# Patient Record
Sex: FEMALE | Race: WHITE | NOT HISPANIC OR LATINO | Employment: OTHER | URBAN - METROPOLITAN AREA
[De-identification: names, ages, dates, MRNs, and addresses within clinical notes are randomized per-mention and may not be internally consistent; named-entity substitution may affect disease eponyms.]

---

## 2017-01-03 ENCOUNTER — ANTICOAGULATION VISIT (OUTPATIENT)
Dept: CARDIOLOGY | Facility: CLINIC | Age: 62
End: 2017-01-03

## 2017-01-03 DIAGNOSIS — I48.91 ATRIAL FIBRILLATION, UNSPECIFIED TYPE (HCC): ICD-10-CM

## 2017-01-03 DIAGNOSIS — Z95.2 MITRAL VALVE REPLACED: ICD-10-CM

## 2017-01-03 LAB
INR PPP: 2.3
INR PPP: 2.3

## 2017-01-03 RX ORDER — OMEPRAZOLE 40 MG/1
CAPSULE, DELAYED RELEASE ORAL
Qty: 30 CAPSULE | Refills: 3 | Status: SHIPPED | OUTPATIENT
Start: 2017-01-03 | End: 2017-05-09 | Stop reason: SDUPTHER

## 2017-01-03 RX ORDER — TEMAZEPAM 30 MG/1
CAPSULE ORAL
Qty: 30 CAPSULE | Refills: 2 | Status: SHIPPED | OUTPATIENT
Start: 2017-01-03 | End: 2017-08-09 | Stop reason: SDUPTHER

## 2017-01-04 ENCOUNTER — TELEPHONE (OUTPATIENT)
Dept: FAMILY MEDICINE CLINIC | Facility: CLINIC | Age: 62
End: 2017-01-04

## 2017-01-05 RX ORDER — POTASSIUM CHLORIDE 750 MG/1
10 TABLET, EXTENDED RELEASE ORAL 3 TIMES DAILY
Qty: 90 TABLET | Refills: 3 | Status: SHIPPED | OUTPATIENT
Start: 2017-01-05 | End: 2017-05-18 | Stop reason: SDUPTHER

## 2017-01-11 ENCOUNTER — TELEPHONE (OUTPATIENT)
Dept: FAMILY MEDICINE CLINIC | Facility: CLINIC | Age: 62
End: 2017-01-11

## 2017-01-11 DIAGNOSIS — Z02.89 PAIN MEDICATION AGREEMENT: Primary | ICD-10-CM

## 2017-01-11 RX ORDER — HYDROCODONE BITARTRATE AND ACETAMINOPHEN 5; 325 MG/1; MG/1
1 TABLET ORAL EVERY 8 HOURS PRN
Qty: 60 TABLET | Refills: 0 | Status: SHIPPED | OUTPATIENT
Start: 2017-01-11 | End: 2017-02-08 | Stop reason: SDUPTHER

## 2017-01-11 NOTE — TELEPHONE ENCOUNTER
Pt informed of lab results she wants to know if we can go ahead and put in a urine drug screen so she can get that over with

## 2017-01-11 NOTE — TELEPHONE ENCOUNTER
----- Message from Mary Ellen Sarmiento MA sent at 1/11/2017  3:13 PM EST -----  Contact: PATIENT [phone]      ----- Message -----     From: Nadira Alegria     Sent: 1/11/2017   2:09 PM       To: Mary Ellen Sarmiento MA    LAB RESULTS DONE HERE

## 2017-01-19 LAB
CONV REPORT SUMMARY: NORMAL
Lab: NORMAL

## 2017-01-31 RX ORDER — LISINOPRIL 5 MG/1
5 TABLET ORAL DAILY
Qty: 30 TABLET | Refills: 3 | Status: SHIPPED | OUTPATIENT
Start: 2017-01-31 | End: 2017-06-07 | Stop reason: SDUPTHER

## 2017-02-08 ENCOUNTER — TELEPHONE (OUTPATIENT)
Dept: FAMILY MEDICINE CLINIC | Facility: CLINIC | Age: 62
End: 2017-02-08

## 2017-02-08 RX ORDER — HYDROCODONE BITARTRATE AND ACETAMINOPHEN 5; 325 MG/1; MG/1
1 TABLET ORAL EVERY 8 HOURS PRN
Qty: 60 TABLET | Refills: 0 | Status: SHIPPED | OUTPATIENT
Start: 2017-02-08 | End: 2017-03-21 | Stop reason: SDUPTHER

## 2017-03-20 RX ORDER — ROPINIROLE 2 MG/1
2 TABLET, FILM COATED ORAL NIGHTLY
Qty: 30 TABLET | Refills: 3 | Status: SHIPPED | OUTPATIENT
Start: 2017-03-20 | End: 2017-07-03 | Stop reason: SDUPTHER

## 2017-03-21 ENCOUNTER — TELEPHONE (OUTPATIENT)
Dept: FAMILY MEDICINE CLINIC | Facility: CLINIC | Age: 62
End: 2017-03-21

## 2017-03-21 RX ORDER — HYDROCODONE BITARTRATE AND ACETAMINOPHEN 5; 325 MG/1; MG/1
1 TABLET ORAL EVERY 8 HOURS PRN
Qty: 60 TABLET | Refills: 0 | Status: SHIPPED | OUTPATIENT
Start: 2017-03-21 | End: 2017-03-23 | Stop reason: SDUPTHER

## 2017-03-21 RX ORDER — WARFARIN SODIUM 3 MG/1
TABLET ORAL
Qty: 45 TABLET | Refills: 1 | Status: SHIPPED | OUTPATIENT
Start: 2017-03-21 | End: 2017-06-21 | Stop reason: SDUPTHER

## 2017-03-23 RX ORDER — HYDROCODONE BITARTRATE AND ACETAMINOPHEN 5; 325 MG/1; MG/1
1 TABLET ORAL EVERY 8 HOURS PRN
Qty: 90 TABLET | Refills: 0 | Status: SHIPPED | OUTPATIENT
Start: 2017-03-23 | End: 2017-05-25 | Stop reason: SDUPTHER

## 2017-04-04 RX ORDER — ATORVASTATIN CALCIUM 40 MG/1
40 TABLET, FILM COATED ORAL DAILY
Qty: 30 TABLET | Refills: 3 | Status: ON HOLD | OUTPATIENT
Start: 2017-04-04 | End: 2017-08-17 | Stop reason: SDUPTHER

## 2017-04-07 ENCOUNTER — OFFICE VISIT (OUTPATIENT)
Dept: CARDIOLOGY | Facility: CLINIC | Age: 62
End: 2017-04-07

## 2017-04-07 VITALS
WEIGHT: 167 LBS | HEART RATE: 68 BPM | OXYGEN SATURATION: 98 % | BODY MASS INDEX: 36.03 KG/M2 | HEIGHT: 57 IN | SYSTOLIC BLOOD PRESSURE: 158 MMHG | DIASTOLIC BLOOD PRESSURE: 82 MMHG

## 2017-04-07 DIAGNOSIS — Z98.890 S/P TVR (TRICUSPID VALVE REPAIR): ICD-10-CM

## 2017-04-07 DIAGNOSIS — E78.49 OTHER HYPERLIPIDEMIA: ICD-10-CM

## 2017-04-07 DIAGNOSIS — I48.0 PAROXYSMAL ATRIAL FIBRILLATION (HCC): ICD-10-CM

## 2017-04-07 DIAGNOSIS — I10 ESSENTIAL HYPERTENSION: ICD-10-CM

## 2017-04-07 DIAGNOSIS — Z95.2 MITRAL VALVE REPLACED: Primary | ICD-10-CM

## 2017-04-07 PROCEDURE — 99214 OFFICE O/P EST MOD 30 MIN: CPT | Performed by: INTERNAL MEDICINE

## 2017-04-07 PROCEDURE — 93000 ELECTROCARDIOGRAM COMPLETE: CPT | Performed by: INTERNAL MEDICINE

## 2017-04-07 NOTE — PROGRESS NOTES
Date of Office Visit: 17  Encounter Provider: Earnest Gan MD  Place of Service: [facility] CARDIOLOGY  Patient Name: Paz Browne  :1955      No chief complaint on file.    History of Present Illness  HPI Comments:  The patient is a pleasant, 61-year-old female with a history of previous valvular disease  but then had been lost to follow-up for quite some time.  She had been in the hospital  with progressive shortness of breath in 2014 and was found to be in rapid atrial  fibrillation as well as congestive heart failure.  She had an echocardiogram that showed  normal left ventricular systolic function.  The left atrium was severely dilated.  The  right ventricle was moderately enlarged.  She had severe mitral and severe tricuspid  insufficiency with severe pulmonary hypertension.  She was diuresed and rate controlled.   She then underwent transesophageal echocardiogram that confirmed that.  She then had a  cardiac catheterization, which showed no significant coronary disease but severer mitral  insufficiency.  She underwent urgent mitral valve repair using a #26 Physio-II ring  annuloplasty, which was unsuccessful and had to be replaced with a #31 mm porcine [employer]  Epic prosthesis.  She had tricuspid valve repair.  She also had right and left CryoMaze  procedure and closure of the atrial appendage.  She did well postoperatively and had some  transient renal insufficiency.  She had some junctional rhythm so she was not really  started on antiarrhythmics and was bradycardic, but she went back into atrial  fibrillation.  She was never really in sinus rhythm, so she was started on warfarin.   Since then, she then presented to our arrhythmia clinic on 10/02/2014, with palpitations,  tachycardia, and more shortness of breath.  She appeared to be in atrial flutter.  We  upped her carvedilol, but she continued to have problems so we started her on amiodarone.  She  was then adequately anticoagulated and then underwent electrocardioversion in 10/2014.  We then saw her June 2016 with progressive dyspnea.  She was admitted to the hospital.  She had an echocardiogram that showed normal left her systolic function her mitral valve replacement and tricuspid valve repair appeared normal she had marked pulmonary hypertension.  She had marked diffuse T-wave inversion in her precordial leads.  Her BNP was just mildly elevated.  She underwent cardiac catheterization which showed normal coronary arteries, right and left sided filling pressures were normal but she had moderate pulmonary hypertension.  Her wedge pressure was normal she did not respond vaso-dilators.  She was seen by Dr. Melo of pulmonary and was felt that her issues were predominantly due to her underlying lung disease.  She now comes in for follow-up.  She is doing well.  She is short of breath but is on continuous oxygen and if anything feels better than she's felt in a while. She's had no chest pain or pressure.  No palpitations no near-syncope or syncope.  She gets rare episodes of palpitations maybe once a month lasting one to 2 seconds.  She does get some dizziness if she gets up too quickly.  She did go to [facility] they told her that they felt she had 12-18 months without transplant but at most 5 years with transplant with the risks high.  She's opted not to pursue that.  Again overall she feels that things are better at home.  She had an episode of nosebleed but her INR was actually 2.1 at that time she's had no more sense.    Congestive Heart Failure   Pertinent negatives include no abdominal pain, muscle weakness or nocturia.   Atrial Fibrillation   Symptoms are negative for dizziness and weakness. Past medical history includes atrial fibrillation and CHF.   Shortness of Breath   Associated symptoms include headaches. Pertinent negatives include no abdominal pain, fever,  rash or vomiting.         Past Medical History:   Diagnosis Date   • Atrial fibrillation     Fani procedure   • Atrial flutter     cardioversion   • CHF (congestive heart failure)    • Colon polyp    • COPD (chronic obstructive pulmonary disease)    • Hyperlipidemia    • Hypertension    • Mitral regurgitation    • Palpitations    • Pulmonary hypertension    • Renal failure    • Tachycardia    • Valvular heart disease          Past Surgical History:   Procedure Laterality Date   • CARDIAC CATHETERIZATION  09/01/2014    Right dominant systemt, normal coronary arteries.    • CARDIAC CATHETERIZATION Left 6/10/2016    Procedure: Cardiac catheterization;  Surgeon: Sergei Hall MD;  Location:  [facility] CATH INVASIVE LOCATION;  Service:    • CARDIAC CATHETERIZATION N/A 6/10/2016    Procedure: Right Heart Cath;  Surgeon: Sergei Hall MD;  Location:  [facility] CATH INVASIVE LOCATION;  Service:    • COLONOSCOPY     • KIDNEY SURGERY  04/22/2013   • MAZE PROCEDURE     • MITRAL VALVE REPLACEMENT     • TRICUSPID VALVE REPLACEMENT           Current Outpatient Prescriptions on File Prior to Visit   Medication Sig Dispense Refill   • atorvastatin (LIPITOR) 40 MG tablet Take 1 tablet by mouth Daily. 30 tablet 3   • carvedilol (COREG) 6.25 MG tablet Take 1 tablet by mouth 2 (two) times a day with meals. 60 tablet 5   • cyclobenzaprine (FLEXERIL) 10 MG tablet TAKE 1 TABLET BY MOUTH EVERY BEDTIME 90 tablet 3   • fluticasone-salmeterol (ADVAIR DISKUS) 250-50 MCG/DOSE DISKUS Inhale 1 puff 2 (Two) Times a Day. 60 each 3   • furosemide (LASIX) 20 MG tablet TAKE ONE TABLET BY MOUTH DAILY 90 tablet 1   • HYDROcodone-acetaminophen (NORCO) 5-325 MG per tablet Take 1 tablet by mouth Every 8 (Eight) Hours As Needed for Moderate Pain (4-6). 90 tablet 0   • lisinopril (PRINIVIL,ZESTRIL) 5 MG tablet Take 1 tablet by mouth Daily. 30 tablet 3   • loratadine (CLARITIN) 10 MG tablet Take 10 mg by mouth 2 (Two) Times a Day.     • meclizine (ANTIVERT) 25 MG tablet  TAKE 1 TABLET EVERY 8 HOURS AS NEEDED FOR DIZZINESS  3   • MULTIPLE VITAMINS PO Take by mouth daily.     • omeprazole (priLOSEC) 40 MG capsule TAKE 1 CAPSULE BY MOUTH EVERY DAY 30 capsule 3   • potassium chloride (K-DUR,KLOR-CON) 10 MEQ CR tablet Take 1 tablet by mouth 3 (Three) Times a Day. 90 tablet 3   • rOPINIRole (REQUIP) 2 MG tablet Take 1 tablet by mouth Every Night. 30 tablet 3   • sertraline (ZOLOFT) 100 MG tablet Take 1 tablet by mouth daily. 90 tablet 3   • temazepam (RESTORIL) 30 MG capsule TAKE ONE CAPSULE BY MOUTH AT BEDTIME 30 capsule 2   • Umeclidinium-Vilanterol 62.5-25 MCG/INH aerosol powder  Inhale 62.5 mg Daily. 1 each 3   • warfarin (COUMADIN) 3 MG tablet TAKE 1 TABLET BY MOUTH DAILY. 45 tablet 1   • [DISCONTINUED] azithromycin (ZITHROMAX) 250 MG tablet Take 2 tablets the first day, then 1 tablet daily for 4 days. 6 tablet 0     No current facility-administered medications on file prior to visit.          Social History     Social History   • Marital status:      Spouse name: N/A   • Number of children: N/A   • Years of education: N/A     Occupational History   • Not on file.     Social History Main Topics   • Smoking status: Former Smoker   • Smokeless tobacco: Not on file   • Alcohol use No      Comment: caffine use   • Drug use: No   • Sexual activity: Not on file     Other Topics Concern   • Not on file     Social History Narrative       Family History   Problem Relation Age of Onset   • Adopted: Yes   • No Known Problems Mother    • No Known Problems Father          Review of Systems   Constitution: Negative for decreased appetite, diaphoresis, fever, weakness, malaise/fatigue, weight gain and weight loss.   HENT: Positive for headaches and nosebleeds. Negative for congestion, hearing loss and tinnitus.    Eyes: Negative for blurred vision, double vision, vision loss in left eye, vision loss in right eye and visual disturbance.   Cardiovascular:        As noted in HPI   Respiratory:  "       As noted HPI   Endocrine: Negative for cold intolerance and heat intolerance.   Hematologic/Lymphatic: Negative for bleeding problem. Does not bruise/bleed easily.   Skin: Negative for color change, flushing, itching and rash.   Musculoskeletal: Positive for joint pain. Negative for arthritis, back pain, joint swelling, muscle weakness and myalgias.   Gastrointestinal: Negative for bloating, abdominal pain, constipation, diarrhea, dysphagia, heartburn, hematemesis, hematochezia, melena, nausea and vomiting.   Genitourinary: Negative for bladder incontinence, dysuria, frequency, nocturia and urgency.   Neurological: Negative for dizziness, focal weakness, light-headedness, loss of balance, numbness, paresthesias and vertigo.   Psychiatric/Behavioral: Negative for depression, memory loss and substance abuse.       Procedures      ECG 12 Lead  Date/Time: 4/7/2017 10:13 AM  Performed by: RAFAEL COVINGTON  Authorized by: RAFAEL COVINGTON   Comparison: compared with previous ECG   Similar to previous ECG  Rhythm: sinus rhythm  Rate: normal  T depression: V1, V2 and V3  QRS axis: normal                 Objective:    /82  Pulse 68  Ht 57\" (144.8 cm)  Wt 167 lb (75.8 kg)  SpO2 98%  BMI 36.14 kg/m2       Physical Exam  Physical Exam   Constitutional: She is oriented to person, place, and time. She appears well-developed and well-nourished. No distress.   HENT:   Head: Normocephalic.   Eyes: Conjunctivae are normal. Pupils are equal, round, and reactive to light. No scleral icterus.   Neck: Normal carotid pulses, no hepatojugular reflux and no JVD present. Carotid bruit is not present. No tracheal deviation, no edema and no erythema present. No thyromegaly present.   Cardiovascular: Normal rate, regular rhythm, S1 normal, S2 normal, normal heart sounds and intact distal pulses.   No extrasystoles are present. PMI is not displaced.  Exam reveals no gallop, no distant heart sounds and no friction rub.    No " murmur heard.  Pulses:       Carotid pulses are 2+ on the right side, and 2+ on the left side.       Radial pulses are 2+ on the right side, and 2+ on the left side.        Femoral pulses are 2+ on the right side, and 2+ on the left side.       Dorsalis pedis pulses are 2+ on the right side, and 2+ on the left side.        Posterior tibial pulses are 2+ on the right side, and 2+ on the left side.   Pulmonary/Chest: Effort normal. No respiratory distress. She has decreased breath sounds (throughout). She has no wheezes. She has no rhonchi. She has no rales. She exhibits no tenderness.   Abdominal: Soft. Bowel sounds are normal. She exhibits no distension and no mass. There is no hepatosplenomegaly. There is no tenderness. There is no rebound and no guarding.   Musculoskeletal: She exhibits no edema, tenderness or deformity.   Neurological: She is alert and oriented to person, place, and time.   Skin: Skin is warm and dry. No rash noted. She is not diaphoretic. No cyanosis or erythema. No pallor. Nails show no clubbing.   Psychiatric: She has a normal mood and affect. Her speech is normal and behavior is normal. Judgment and thought content normal.           Assessment:    1. This patient is a 61-year-old female with a history of severe mitral and tricuspid insufficiencies, status post mitral valve replacement with a tissue valve and tricuspid  valve repair. She has had previous normal LV systolic function. Repeat echocardiogram June 2016 unremarkable continue the same. To see us in six months.  2. Atrial fibrillation flutter. She is status post atrial appendage closure and CryoMaze procedure. Appears still be in sinus rhythm.   Atrial Fibrillation and Atrial Flutter  Assessment  • The patient has paroxysmal atrial fibrillation  • This is valvular in etiology  • The patient's CHADS2-VASc score is 2  • A [id number] score of 2 or more is considered a high risk for a thromboembolic event  • Warfarin  prescribed    Plan  • Attempt to maintain sinus rhythm  • Continue beta blocker for rhythm control    Subjective - Objective  • The patient underwent cardioversion   • The patient had atrial fibrillation ablation        We'll continue the same area she will see us in follow-up in 6 months.     3. Hypertension. Her blood pressure is adequately controlled.   4. Hyperlipidemia on therapy. Continue the same.   5. Renal insufficiency.  6. Dyspnea this appears to be from her her lung disease. No obvious heart failure.  Anything she seems to be better.  7. Underlying lung disease with moderate to marked pulmonary hypertension. She is following with pulmonary.  She did meet with transplant they felt it would be a candidate but the patient has declined to pursue that.         Plan:

## 2017-04-11 ENCOUNTER — TELEPHONE (OUTPATIENT)
Dept: FAMILY MEDICINE CLINIC | Facility: CLINIC | Age: 62
End: 2017-04-11

## 2017-04-11 NOTE — TELEPHONE ENCOUNTER
----- Message from Trish Jennings sent at 4/11/2017 10:27 AM EDT -----  Contact: [phone]  PT'S  IS CALLING BACK WANTING PT SEEN TODAY BECAUSE HE'S AFRAID SHE HAS PNEUMONIA. PLEASE CALL ASAP    Pt informed go to er/icc

## 2017-04-13 ENCOUNTER — TELEPHONE (OUTPATIENT)
Dept: FAMILY MEDICINE CLINIC | Facility: CLINIC | Age: 62
End: 2017-04-13

## 2017-04-13 NOTE — TELEPHONE ENCOUNTER
----- Message from Trish Jennings sent at 4/13/2017 10:19 AM EDT -----  Contact: [phone]  PT'S  WANTS TO KNOW IF HER HANDICAPPED PERMIT IS DONE. PLEASE CALL    Informed for 2nd time today was told yesterday

## 2017-04-18 ENCOUNTER — TELEPHONE (OUTPATIENT)
Dept: FAMILY MEDICINE CLINIC | Facility: CLINIC | Age: 62
End: 2017-04-18

## 2017-04-18 NOTE — TELEPHONE ENCOUNTER
----- Message from Sarah Lazo sent at 4/18/2017 10:29 AM EDT -----  Contact: [phone]  NEEDS WRITTEN RX FOR HYDROCODONE 5-325MG QTY 90     LAST PRES WAS ONLY QTY 60       New script up front since 3/25

## 2017-04-30 ENCOUNTER — PREP FOR SURGERY (OUTPATIENT)
Dept: GASTROENTEROLOGY | Facility: CLINIC | Age: 62
End: 2017-04-30

## 2017-04-30 DIAGNOSIS — K63.5 COLON POLYPS: Primary | ICD-10-CM

## 2017-04-30 RX ORDER — SODIUM CHLORIDE 0.9 % (FLUSH) 0.9 %
1-10 SYRINGE (ML) INJECTION AS NEEDED
Status: CANCELLED | OUTPATIENT
Start: 2017-08-04

## 2017-05-02 RX ORDER — SERTRALINE HYDROCHLORIDE 100 MG/1
100 TABLET, FILM COATED ORAL DAILY
Qty: 90 TABLET | Refills: 1 | Status: SHIPPED | OUTPATIENT
Start: 2017-05-02 | End: 2017-11-01 | Stop reason: SDUPTHER

## 2017-05-02 RX ORDER — CARVEDILOL 6.25 MG/1
TABLET ORAL
Qty: 180 TABLET | Refills: 1 | Status: SHIPPED | OUTPATIENT
Start: 2017-05-02 | End: 2017-08-09

## 2017-05-03 ENCOUNTER — TELEPHONE (OUTPATIENT)
Dept: FAMILY MEDICINE CLINIC | Facility: CLINIC | Age: 62
End: 2017-05-03

## 2017-05-09 RX ORDER — OMEPRAZOLE 40 MG/1
40 CAPSULE, DELAYED RELEASE ORAL DAILY
Qty: 90 CAPSULE | Refills: 1 | Status: SHIPPED | OUTPATIENT
Start: 2017-05-09 | End: 2017-08-08 | Stop reason: SDUPTHER

## 2017-05-18 RX ORDER — POTASSIUM CHLORIDE 750 MG/1
10 TABLET, EXTENDED RELEASE ORAL 3 TIMES DAILY
Qty: 90 TABLET | Refills: 3 | Status: SHIPPED | OUTPATIENT
Start: 2017-05-18 | End: 2017-08-09

## 2017-05-24 ENCOUNTER — TELEPHONE (OUTPATIENT)
Dept: CARDIOLOGY | Facility: CLINIC | Age: 62
End: 2017-05-24

## 2017-05-25 ENCOUNTER — TELEPHONE (OUTPATIENT)
Dept: FAMILY MEDICINE CLINIC | Facility: CLINIC | Age: 62
End: 2017-05-25

## 2017-05-25 RX ORDER — HYDROCODONE BITARTRATE AND ACETAMINOPHEN 5; 325 MG/1; MG/1
1 TABLET ORAL EVERY 8 HOURS PRN
Qty: 90 TABLET | Refills: 0 | Status: SHIPPED | OUTPATIENT
Start: 2017-05-25 | End: 2017-06-30 | Stop reason: SDUPTHER

## 2017-06-07 ENCOUNTER — TELEPHONE (OUTPATIENT)
Dept: FAMILY MEDICINE CLINIC | Facility: CLINIC | Age: 62
End: 2017-06-07

## 2017-06-07 RX ORDER — LISINOPRIL 5 MG/1
5 TABLET ORAL DAILY
Qty: 30 TABLET | Refills: 3 | Status: ON HOLD | OUTPATIENT
Start: 2017-06-07 | End: 2017-08-25

## 2017-06-14 ENCOUNTER — HOSPITAL ENCOUNTER (EMERGENCY)
Facility: HOSPITAL | Age: 62
Discharge: HOME | End: 2017-06-14
Attending: EMERGENCY MEDICINE | Admitting: EMERGENCY MEDICINE

## 2017-06-14 ENCOUNTER — APPOINTMENT (OUTPATIENT)
Dept: CT IMAGING | Facility: HOSPITAL | Age: 62
End: 2017-06-14

## 2017-06-14 VITALS
OXYGEN SATURATION: 97 % | TEMPERATURE: 98.5 F | WEIGHT: 167 LBS | SYSTOLIC BLOOD PRESSURE: 109 MMHG | DIASTOLIC BLOOD PRESSURE: 75 MMHG | HEART RATE: 70 BPM | RESPIRATION RATE: 16 BRPM | HEIGHT: 67 IN | BODY MASS INDEX: 26.21 KG/M2
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
## 2017-06-14 DIAGNOSIS — S00.03XA SCALP CONTUSION: ICD-10-CM

## 2017-06-14 LAB
INR PPP: 2.16 (ref 0.9–1.1)
PROTHROMBIN TIME: 23.3 SECONDS (ref 11.7–14.2)

## 2017-06-14 PROCEDURE — 70450 CT HEAD/BRAIN W/O DYE: CPT

## 2017-06-14 PROCEDURE — 85610 PROTHROMBIN TIME: CPT | Performed by: PHYSICIAN ASSISTANT

## 2017-06-14 PROCEDURE — 99283 EMERGENCY DEPT VISIT LOW MDM: CPT

## 2017-06-14 NOTE — ED TRIAGE NOTES
Chief Complaint   Patient presents with   • Head Injury     fell 2 weeks ago and has knot on the back of her head that is worse

## 2017-06-14 NOTE — ED PROVIDER NOTES
" EMERGENCY DEPARTMENT ENCOUNTER    CHIEF COMPLAINT  Chief Complaint: Head Injury  History given by: Patient  History limited by: Nothing  Room Number: 01/01  PMD: Javan Martinez MD      HPI:  Pt is a 62 y.o. female with h/o valve replacement on coumadin, who presents to the ED s/p mechanical fall two weeks ago complaining of a gradually worsening hematoma to the left parietal aspect of her scalp. The patient reports that she simply tripped over her dog while she was cleaning out the fridge and she denies any dizziness or lightheadedness prior to the fall. The patient reports that she \"saw stars\" but never completely lost consciousness after the initial fall. She has also experienced a diffuse HA with gradual onset since the head injury occurred but denies any decreased concentration, nausea or vomiting. No other complaints at this time.       Duration:  Seconds  Onset: Sudden  Timing: Constant  Location: Left parietal   Radiation: None  Quality: Dull  Intensity/Severity: Moderate  Progression: Worsening  Associated Symptoms: Hematoma, HA  Aggravating Factors: Palpation  Alleviating Factors: Rest  Previous Episodes: None  Treatment before arrival: None    PAST MEDICAL HISTORY  Active Ambulatory Problems     Diagnosis Date Noted   • Mitral valve replaced 01/25/2016   • Atrial fibrillation [I48.91] 01/25/2016   • Valvular heart disease    • Tachycardia    • Renal failure    • Palpitations    • Hypertension    • Hyperlipidemia    • COPD (chronic obstructive pulmonary disease)    • Mitral regurgitation    • Pain medication agreement 05/04/2016   • Hiatal hernia 05/04/2016   • Primary osteoarthritis involving multiple joints 05/04/2016   • Heart failure 06/08/2016   • Pulmonary hypertension    • S/P TVR (tricuspid valve repair) 07/07/2016   • Long term current use of anticoagulant 10/13/2016   • Pulmonary nodule 10/13/2016   • Aspiration pneumonia 10/14/2016   • Hemoptysis 10/14/2016   • RLS (restless legs syndrome) " 11/18/2016     Resolved Ambulatory Problems     Diagnosis Date Noted   • No Resolved Ambulatory Problems     Past Medical History:   Diagnosis Date   • Atrial fibrillation    • Atrial flutter    • CHF (congestive heart failure)    • Colon polyp    • COPD (chronic obstructive pulmonary disease)    • Hyperlipidemia    • Hypertension    • Mitral regurgitation    • Palpitations    • Pulmonary hypertension    • Renal failure    • Tachycardia    • Valvular heart disease        PAST SURGICAL HISTORY  Past Surgical History:   Procedure Laterality Date   • CARDIAC CATHETERIZATION  09/01/2014    Right dominant systemt, normal coronary arteries.    • CARDIAC CATHETERIZATION Left 6/10/2016    Procedure: Cardiac catheterization;  Surgeon: Sergei Hall MD;  Location:  [facility] LOCATION;  Service:    • CARDIAC CATHETERIZATION N/A 6/10/2016    Procedure: Right Heart Cath;  Surgeon: Sergei Hall MD;  Location:  [facility] LOCATION;  Service:    • COLONOSCOPY     • KIDNEY SURGERY  04/22/2013   • MAZE PROCEDURE     • MITRAL VALVE REPLACEMENT     • TRICUSPID VALVE REPLACEMENT         FAMILY HISTORY  Family History   Problem Relation Age of Onset   • Adopted: Yes   • No Known Problems Mother    • No Known Problems Father        SOCIAL HISTORY  Social History     Social History   • Marital status:      Spouse name: N/A   • Number of children: N/A   • Years of education: N/A     Occupational History   • Not on file.     Social History Main Topics   • Smoking status: Former Smoker   • Smokeless tobacco: Not on file   • Alcohol use No      Comment: caffine use   • Drug use: No   • Sexual activity: Defer     Other Topics Concern   • Not on file     Social History Narrative   • No narrative on file       ALLERGIES  Bupropion; Cephalexin; and Metoprolol    REVIEW OF SYSTEMS  Review of Systems   Constitutional: Negative for chills.   HENT: Negative for congestion, rhinorrhea and sore throat.    Eyes: Negative for pain.    Respiratory: Negative for cough and shortness of breath.    Cardiovascular: Negative for chest pain, palpitations and leg swelling.   Gastrointestinal: Negative for abdominal pain, diarrhea, nausea and vomiting.   Genitourinary: Negative for difficulty urinating, dysuria, flank pain and frequency.   Musculoskeletal: Negative for myalgias, neck pain and neck stiffness.   Skin: Negative for rash.        Hematoma   Neurological: Positive for headaches. Negative for dizziness, speech difficulty, weakness, light-headedness and numbness.   Psychiatric/Behavioral: Negative.    All other systems reviewed and are negative.      PHYSICAL EXAM  ED Triage Vitals   Temp Heart Rate Resp BP SpO2   06/14/17 0947 06/14/17 0947 06/14/17 0947 -- 06/14/17 0947   98.3 °F (36.8 °C) 94 20  96 %      Temp src Heart Rate Source Patient Position BP Location FiO2 (%)   06/14/17 0947 06/14/17 0947 -- -- --   Tympanic Monitor          Physical Exam   Constitutional: She is oriented to person, place, and time and well-developed, well-nourished, and in no distress. No distress.   HENT:   Head: Normocephalic.   Large hematoma to the left parietal scalp without laceration.    Eyes: EOM are normal. Pupils are equal, round, and reactive to light.   Neck: Normal range of motion.   Cardiovascular: Normal rate and regular rhythm.    No murmur heard.  Pulmonary/Chest: Effort normal and breath sounds normal. No respiratory distress.   Abdominal: Soft. There is no tenderness. There is no rebound and no guarding.   Musculoskeletal: Normal range of motion. She exhibits no edema.   No vertebral neck tenderness.    Neurological: She is alert and oriented to person, place, and time.   Skin: Skin is warm and dry. No rash noted.   Multiple ecchymotic areas.    Psychiatric: Mood and affect normal.   Nursing note and vitals reviewed.      LAB RESULTS  Lab Results (last 24 hours)     Procedure Component Value Units Date/Time    Protime-INR [[id number]]  (Abnormal)  Collected:  06/14/17 1052    Specimen:  Blood Updated:  06/14/17 1109     Protime 23.3 (H) Seconds      INR 2.16 (H)          I ordered the above labs and reviewed the results    RADIOLOGY  CT Head Without Contrast   Preliminary Result   Scalp hematoma with no evidence of fracture or hemorrhage.             11:25  Reviewed CT head - large area of soft tissue swelling at the left parietal scalp but there is no intercranial injury. Independently viewed by me. Interpreted by radiologist. Discussed with Radiologist.      I ordered the above noted radiological studies. Interpreted by radiologist. Discussed with radiologist. Reviewed by me in PACS.       PROCEDURES  Procedures      PROGRESS AND CONSULTS  ED Course     10:27  Ordered CT head and Protime-INR for further evaluation.     11:23  Discussed case with  and he agrees with the treatment plan.     12:28  Rechecked patient and they are resting comfortably. Discussed the patient's pertinent imaging results, including CT head shows a large area of swelling but no intercranial injury. Discussed plan to discharge the patient home and recommended follow up with her PCP as directed. Patient agrees with the plan and all questions were addressed.     Latest vital signs   BP- 136/87 HR- 77 Temp- 98.3 °F (36.8 °C) (Tympanic) O2 sat- 96%      MEDICAL DECISION MAKING  Results were reviewed/discussed with the patient and they were also made aware of online access. Pt also made aware that some labs, such as cultures, will not be resulted during ER visit and follow up with PMD is necessary.     MDM  Number of Diagnoses or Management Options  Fall, initial encounter:   Scalp contusion:   Diagnosis management comments: No neuro deficits, no evidence of ICH       Amount and/or Complexity of Data Reviewed  Clinical lab tests: reviewed and ordered (INR 2.16 )  Tests in the radiology section of CPT®: reviewed and ordered (CT head - large area of soft tissue swelling at the left  parietal scalp but there is no intercranial injury.)  Discussion of test results with the performing providers: yes    Patient Progress  Patient progress: stable         DIAGNOSIS  Final diagnoses:   Fall, initial encounter   Scalp contusion       DISPOSITION  DISCHARGE    Patient discharged in stable condition.    Reviewed implications of results, diagnosis, meds, responsibility to follow up, warning signs and symptoms of possible worsening, potential complications and reasons to return to ER, including any further falls.     Patient/Family voiced understanding of above instructions.    Discussed plan for discharge, as there is no emergent indication for admission.  Pt/family is agreeable and understands need for follow up and repeat testing.  Pt is aware that discharge does not mean that nothing is wrong but it indicates no emergency is present that requires admission and they must continue care with follow-up as given below or physician of their choice.     FOLLOW-UP  Javan Martinez MD  [address] [place], [place] [id number] [phone]    In 2 weeks  if no improvement         Medication List      Notice     No changes were made to your prescriptions during this visit.          Latest Documented Vital Signs:  As of 12:47 PM  BP- 136/87 HR- 77 Temp- 98.3 °F (36.8 °C) (Tympanic) O2 sat- 96%    --  Documentation assistance provided by thanh Langford for Aly Barlow PA-C.  Information recorded by the scribdanuta was done at my direction and has been verified and validated by me.       Rivas Langford  06/14/17 [id number]       HUY Hill  06/14/17 [id number]

## 2017-06-14 NOTE — ED PROVIDER NOTES
Pt presents to ER c/o a head injury sustained 2 weeks ago. She denies LOC at the time. Pt reports experiencing headaches since the incident. On exam, pt has a hematoma on her L posterior scalp, no vertebral tenderness of neck, cranial nerves intact and normal ROM. Plan to check CT and INR.     I supervised care provided by the midlevel provider.    We have discussed this patient's history, physical exam, and treatment plan.   I have reviewed the note and personally saw and examined the patient and agree with the plan of care.    Documentation assistance provided by thanh Vega for .  Information recorded by the thanh was done at my direction and has been verified and validated by me.       Amada Vega  06/14/17 1132       Yg Bolton MD  06/14/17 3608

## 2017-06-15 ENCOUNTER — TELEPHONE (OUTPATIENT)
Dept: SOCIAL WORK | Facility: HOSPITAL | Age: 62
End: 2017-06-15

## 2017-06-15 NOTE — TELEPHONE ENCOUNTER
ED f/u phone call. States she continues to have a H/A, but no other problems. Reminded to f/u w/ PCP in two weeks if necessary. No questions/concerns

## 2017-06-19 RX ORDER — WARFARIN SODIUM 3 MG/1
TABLET ORAL
Qty: 45 TABLET | Refills: 1 | Status: CANCELLED | OUTPATIENT
Start: 2017-06-19

## 2017-06-21 RX ORDER — WARFARIN SODIUM 3 MG/1
TABLET ORAL
Qty: 45 TABLET | Refills: 2 | Status: ON HOLD | OUTPATIENT
Start: 2017-06-21 | End: 2017-08-09 | Stop reason: SDUPTHER

## 2017-06-30 ENCOUNTER — TELEPHONE (OUTPATIENT)
Dept: FAMILY MEDICINE CLINIC | Facility: CLINIC | Age: 62
End: 2017-06-30

## 2017-06-30 RX ORDER — HYDROCODONE BITARTRATE AND ACETAMINOPHEN 5; 325 MG/1; MG/1
1 TABLET ORAL EVERY 8 HOURS PRN
Qty: 90 TABLET | Refills: 0 | Status: SHIPPED | OUTPATIENT
Start: 2017-06-30 | End: 2017-08-02 | Stop reason: SDUPTHER

## 2017-06-30 RX ORDER — HYDROCODONE BITARTRATE AND ACETAMINOPHEN 5; 325 MG/1; MG/1
1 TABLET ORAL EVERY 8 HOURS PRN
Qty: 90 TABLET | Refills: 0 | Status: SHIPPED | OUTPATIENT
Start: 2017-06-30 | End: 2017-06-30 | Stop reason: SDUPTHER

## 2017-07-03 RX ORDER — ROPINIROLE 2 MG/1
2 TABLET, FILM COATED ORAL NIGHTLY
Qty: 30 TABLET | Refills: 3 | Status: SHIPPED | OUTPATIENT
Start: 2017-07-03 | End: 2017-08-01 | Stop reason: SDUPTHER

## 2017-07-05 ENCOUNTER — TELEPHONE (OUTPATIENT)
Dept: CARDIOLOGY | Facility: CLINIC | Age: 62
End: 2017-07-05

## 2017-07-05 DIAGNOSIS — Z95.2 S/P MVR (MITRAL VALVE REPLACEMENT): Primary | ICD-10-CM

## 2017-07-05 NOTE — TELEPHONE ENCOUNTER
Pt is scheduled for a colonoscopy on 8/4 w/Dr Devine. He's recommended she hold her Warfarin  5 days prior. Please advise.  Felicia [phone]

## 2017-07-06 ENCOUNTER — TELEPHONE (OUTPATIENT)
Dept: CARDIOLOGY | Facility: CLINIC | Age: 62
End: 2017-07-06

## 2017-07-06 NOTE — TELEPHONE ENCOUNTER
They are actually recommending she hold x7 days, & would like you to manage Lovenox. Please advise-saharak

## 2017-07-06 NOTE — TELEPHONE ENCOUNTER
Instructions given verbally to Felicia, MI will sign orders, and a copy will be mailed to Felicia per her request./saharak    *She will have a BMP drawn next week    Felicia Rose  [address] [place] [address]

## 2017-07-06 NOTE — TELEPHONE ENCOUNTER
I can. Start lovenox 1 mg/kg sub Q bid after 3 days holding warfarin. Stop Lovenox the day before colonoscopy, than restart it with warfarin day after clonoscopy and ck INR in 3 days. She also needs a BMP before all this.SERGIO

## 2017-07-14 ENCOUNTER — APPOINTMENT (OUTPATIENT)
Dept: LAB | Facility: HOSPITAL | Age: 62
End: 2017-07-14

## 2017-07-14 LAB
ANION GAP SERPL CALCULATED.3IONS-SCNC: 12.8 MMOL/L
BUN BLD-MCNC: 14 MG/DL (ref 8–23)
BUN/CREAT SERPL: 12.6 (ref 7–25)
CALCIUM SPEC-SCNC: 10.4 MG/DL (ref 8.6–10.5)
CHLORIDE SERPL-SCNC: 98 MMOL/L (ref 98–107)
CO2 SERPL-SCNC: 29.2 MMOL/L (ref 22–29)
CREAT BLD-MCNC: 1.11 MG/DL (ref 0.57–1)
GFR SERPL CREATININE-BSD FRML MDRD: 49 ML/MIN/1.73
GLUCOSE BLD-MCNC: 79 MG/DL (ref 65–99)
POTASSIUM BLD-SCNC: 4.3 MMOL/L (ref 3.5–5.2)
SODIUM BLD-SCNC: 140 MMOL/L (ref 136–145)

## 2017-07-14 PROCEDURE — 80048 BASIC METABOLIC PNL TOTAL CA: CPT | Performed by: INTERNAL MEDICINE

## 2017-07-14 PROCEDURE — 36415 COLL VENOUS BLD VENIPUNCTURE: CPT

## 2017-07-19 ENCOUNTER — TELEPHONE (OUTPATIENT)
Dept: FAMILY MEDICINE CLINIC | Facility: CLINIC | Age: 62
End: 2017-07-19

## 2017-07-19 RX ORDER — FUROSEMIDE 20 MG/1
TABLET ORAL
Qty: 90 TABLET | Refills: 1 | Status: SHIPPED | OUTPATIENT
Start: 2017-07-19 | End: 2017-08-09

## 2017-08-01 ENCOUNTER — OFFICE VISIT (OUTPATIENT)
Dept: FAMILY MEDICINE CLINIC | Facility: CLINIC | Age: 62
End: 2017-08-01

## 2017-08-01 ENCOUNTER — TELEPHONE (OUTPATIENT)
Dept: CARDIOLOGY | Facility: CLINIC | Age: 62
End: 2017-08-01

## 2017-08-01 VITALS
DIASTOLIC BLOOD PRESSURE: 60 MMHG | SYSTOLIC BLOOD PRESSURE: 144 MMHG | BODY MASS INDEX: 26.68 KG/M2 | WEIGHT: 170 LBS | OXYGEN SATURATION: 97 % | HEIGHT: 67 IN | HEART RATE: 85 BPM | TEMPERATURE: 98.7 F

## 2017-08-01 DIAGNOSIS — Z12.31 ENCOUNTER FOR SCREENING MAMMOGRAM FOR BREAST CANCER: ICD-10-CM

## 2017-08-01 DIAGNOSIS — I50.9 CONGESTIVE HEART FAILURE, UNSPECIFIED CONGESTIVE HEART FAILURE CHRONICITY, UNSPECIFIED CONGESTIVE HEART FAILURE TYPE: ICD-10-CM

## 2017-08-01 DIAGNOSIS — G25.81 RESTLESS LEG SYNDROME: ICD-10-CM

## 2017-08-01 DIAGNOSIS — I48.91 ATRIAL FIBRILLATION, UNSPECIFIED TYPE (HCC): ICD-10-CM

## 2017-08-01 DIAGNOSIS — J44.9 CHRONIC OBSTRUCTIVE PULMONARY DISEASE, UNSPECIFIED COPD TYPE (HCC): ICD-10-CM

## 2017-08-01 DIAGNOSIS — R06.02 SHORTNESS OF BREATH: Primary | ICD-10-CM

## 2017-08-01 LAB
ALBUMIN SERPL-MCNC: 4.4 G/DL (ref 3.5–5.2)
ALBUMIN/GLOB SERPL: 1.4 G/DL
ALP SERPL-CCNC: 89 U/L (ref 39–117)
ALT SERPL W P-5'-P-CCNC: 19 U/L (ref 1–33)
ANION GAP SERPL CALCULATED.3IONS-SCNC: 15.5 MMOL/L
AST SERPL-CCNC: 21 U/L (ref 1–32)
BILIRUB SERPL-MCNC: 0.8 MG/DL (ref 0.1–1.2)
BUN BLD-MCNC: 11 MG/DL (ref 8–23)
BUN/CREAT SERPL: 10.4 (ref 7–25)
CALCIUM SPEC-SCNC: 9.9 MG/DL (ref 8.6–10.5)
CHLORIDE SERPL-SCNC: 99 MMOL/L (ref 98–107)
CO2 SERPL-SCNC: 27.5 MMOL/L (ref 22–29)
CREAT BLD-MCNC: 1.06 MG/DL (ref 0.57–1)
ERYTHROCYTE [DISTWIDTH] IN BLOOD BY AUTOMATED COUNT: 14.6 % (ref 4.5–15)
GFR SERPL CREATININE-BSD FRML MDRD: 52 ML/MIN/1.73
GLOBULIN UR ELPH-MCNC: 3.2 GM/DL
GLUCOSE BLD-MCNC: 101 MG/DL (ref 65–99)
HCT VFR BLD AUTO: 32.3 % (ref 31–42)
HGB BLD-MCNC: 10.6 G/DL (ref 12–18)
LYMPHOCYTES # BLD AUTO: 1.5 10*3/MM3 (ref 1.2–3.4)
LYMPHOCYTES NFR BLD AUTO: 17.2 % (ref 21–51)
MCH RBC QN AUTO: 27.3 PG (ref 26.1–33.1)
MCHC RBC AUTO-ENTMCNC: 33 G/DL (ref 33–37)
MCV RBC AUTO: 82.8 FL (ref 80–99)
MONOCYTES # BLD AUTO: 0.4 10*3/MM3 (ref 0.1–0.6)
MONOCYTES NFR BLD AUTO: 4.2 % (ref 2–9)
NEUTROPHILS # BLD AUTO: 7 10*3/MM3 (ref 1.4–6.5)
NEUTROPHILS NFR BLD AUTO: 78.6 % (ref 42–75)
NT-PROBNP SERPL-MCNC: 5140 PG/ML (ref 5–900)
PLATELET # BLD AUTO: 218 10*3/MM3 (ref 150–450)
PMV BLD AUTO: 7.6 FL (ref 7.1–10.5)
POTASSIUM BLD-SCNC: 4.5 MMOL/L (ref 3.5–5.2)
PROT SERPL-MCNC: 7.6 G/DL (ref 6–8.5)
RBC # BLD AUTO: 3.9 10*6/MM3 (ref 4–6)
SODIUM BLD-SCNC: 142 MMOL/L (ref 136–145)
WBC NRBC COR # BLD: 8.9 10*3/MM3 (ref 4.5–10)

## 2017-08-01 PROCEDURE — 80053 COMPREHEN METABOLIC PANEL: CPT | Performed by: NURSE PRACTITIONER

## 2017-08-01 PROCEDURE — 99214 OFFICE O/P EST MOD 30 MIN: CPT | Performed by: NURSE PRACTITIONER

## 2017-08-01 PROCEDURE — 36415 COLL VENOUS BLD VENIPUNCTURE: CPT | Performed by: NURSE PRACTITIONER

## 2017-08-01 PROCEDURE — 85025 COMPLETE CBC W/AUTO DIFF WBC: CPT | Performed by: NURSE PRACTITIONER

## 2017-08-01 PROCEDURE — 71020 XR CHEST PA AND LATERAL: CPT | Performed by: NURSE PRACTITIONER

## 2017-08-01 PROCEDURE — 83880 ASSAY OF NATRIURETIC PEPTIDE: CPT | Performed by: NURSE PRACTITIONER

## 2017-08-01 RX ORDER — ROPINIROLE 2 MG/1
2 TABLET, FILM COATED ORAL NIGHTLY
Qty: 30 TABLET | Refills: 5 | Status: SHIPPED | OUTPATIENT
Start: 2017-08-01 | End: 2017-09-27 | Stop reason: SDUPTHER

## 2017-08-01 RX ORDER — POTASSIUM CHLORIDE 20 MEQ/1
20 TABLET, EXTENDED RELEASE ORAL 2 TIMES DAILY
Qty: 60 TABLET | Refills: 5 | Status: ON HOLD | OUTPATIENT
Start: 2017-08-01 | End: 2017-08-25

## 2017-08-01 RX ORDER — FUROSEMIDE 40 MG/1
40 TABLET ORAL 2 TIMES DAILY
Qty: 60 TABLET | Refills: 5 | Status: ON HOLD | OUTPATIENT
Start: 2017-08-01 | End: 2017-08-25

## 2017-08-01 RX ORDER — ALBUTEROL SULFATE 2.5 MG/3ML
SOLUTION RESPIRATORY (INHALATION)
Refills: 9 | COMMUNITY
Start: 2017-07-05 | End: 2017-08-09 | Stop reason: SDUPTHER

## 2017-08-01 NOTE — PROGRESS NOTES
Subjective   Paz Browne is a 62 y.o. female.     History of Present Illness   C/o SOA worsening last few days, concerned has pneumonia or fluid retention, using oxygen 3L today, increased 5-6 L when walking for COPD, Weighing self AM at home 07/24 169 07/25 170 but thinks may have gained 5-10 lbs, on lasix 20 mg daily and potassium 10 mEq TID, on lisinopril 5 mg daily coreg 6.25 mg BID,with some abd distension d/t swelling, no B LE edema, sees Dr Gan cardiology last seen 04/17 FU every 6 mo, with afib on coumadin 3 mg gets monthly monitoring at cardiology last checked 07/05/17  Uses albuterol nebulizer solution this AM but normally doesn't have to use, on spiriva, advair, sees pulm for pulmonary nodule and COPD  On lovenox currently pending colonoscopy Friday this week  Here with daughter who is wondering about mammo overdue, last pap overdue  C/o worsening RLS on ropinirole 1 mg HS and request increase dose as  c/o still having restless leg    The following portions of the patient's history were reviewed and updated as appropriate: allergies, current medications, past family history, past medical history, past social history, past surgical history and problem list.    Review of Systems   Constitutional: Positive for fatigue. Negative for fever.   HENT: Negative for congestion, ear discharge, ear pain, facial swelling, postnasal drip, sinus pressure, sore throat, trouble swallowing and voice change.    Respiratory: Positive for chest tightness and shortness of breath. Negative for cough and wheezing.    Cardiovascular: Negative for chest pain, palpitations and leg swelling.   Gastrointestinal: Positive for abdominal distention. Negative for abdominal pain, anal bleeding, blood in stool, constipation, diarrhea, nausea and vomiting.   Neurological: Negative for dizziness and headaches.   All other systems reviewed and are negative.      Objective   Physical Exam   Constitutional: She is oriented to  person, place, and time. She appears well-developed and well-nourished.   HENT:   Head: Normocephalic and atraumatic.   Eyes: Conjunctivae and EOM are normal. Pupils are equal, round, and reactive to light.   Cardiovascular: Normal rate, regular rhythm and normal heart sounds.    Pulmonary/Chest: Effort normal. She has wheezes (expiratory throughout all lobes).   Abdominal: She exhibits distension. There is no tenderness. There is no rebound and no guarding.   Musculoskeletal: Normal range of motion.   Neurological: She is alert and oriented to person, place, and time.   Skin: Skin is warm and dry.   Psychiatric: She has a normal mood and affect. Her behavior is normal. Judgment and thought content normal.   Vitals reviewed.  chest xray 2 v without comparison for shortness of breath and COPD shows NAD, COPD and emphysema stable    Assessment/Plan   Paz was seen today for shortness of breath.    Diagnoses and all orders for this visit:    Shortness of breath  -     CBC & Differential  -     Comprehensive Metabolic Panel  -     proBNP  -     CBC Auto Differential    Chronic obstructive pulmonary disease, unspecified COPD type  -     CBC & Differential  -     XR Chest PA & Lateral  -     CBC Auto Differential    Congestive heart failure, unspecified congestive heart failure chronicity, unspecified congestive heart failure type  -     Comprehensive Metabolic Panel  -     proBNP    Atrial fibrillation, unspecified type    Restless leg syndrome    Encounter for screening mammogram for breast cancer  -     Mammo Screening Bilateral With CAD; Future    Other orders  -     rOPINIRole (REQUIP) 2 MG tablet; Take 1 tablet by mouth Every Night.  -     potassium chloride (K-DUR,KLOR-CON) 20 MEQ CR tablet; Take 1 tablet by mouth 2 (Two) Times a Day.  -     furosemide (LASIX) 40 MG tablet; Take 1 tablet by mouth 2 (Two) Times a Day.    check labs and call with results, CBC shows low hemoglobin, nl WBC, CXR NAD, BNP elevated  will increase lasix 40 mg BID and potassium 20 mEq BID, increase ropinirole 2 mg HS, order mammo, RTC overdue pap and medicare wellness, called [facility] for Dr Gan and left message about diuretic change, sent labs via [employer] to Dr Gan with note for him to review, spoke with patient and Rachel, pt's daughter on phone, verified understanding of diuretic increase and if sx worsen go to ER may need IV diuretics

## 2017-08-01 NOTE — PATIENT INSTRUCTIONS
check labs and call with results, CBC shows low hemoglobin, nl WBC, CXR NAD, BNP elevated will increase lasix 40 mg BID and potassium 20 mEq BID, increase ropinirole 2 mg HS, order mammo, RTC overdue pap and medicare wellness, called [facility] for Dr Gna and left message about diuretic change, sent labs via [employer] to Dr Gan with note for him to review, spoke with patient and Rachel, pt's daughter on phone, verified understanding of diuretic increase and if sx worsen go to ER may need IV diuretics

## 2017-08-01 NOTE — TELEPHONE ENCOUNTER
Paz Browne A  Female, 62 y.o., 1955  PCP:   Javan Martinez MD  Language:   English  Need Interp:   No  Last Weight:   170 lb (77.1 kg)  Phone:   H: [phone] M: [phone] W: [phone]  Allergies  Bupropion  Cephalexin  Metoprolol  Health Maintenance:   Due  FYI:   None  Primary Ins.:   PAT THAO  MRN:   [id number]  MyChart:   Pending  Pharmacy:   [facility]/PHARMACY #[id number] [address] AT [facility] - [phone]  - [phone] [[id number]]  Preferred Lab:   None  Next Appt with Me:   10/10/2017  Next Appt Date by Dept:   08/21/2017    Message  Received: Today       BA Burnham sent to Earnest Gan MD                     Saw patient in the clinic this AM complaining of shortness of air, chest xray shows COPD and emphysema no pneumonia. BNP 5140, up from 339 9 mo ago, I left voicemail on [employer]'s phone. She was only taking lasix 20 mg daily- I told her to increase lasix 40 mg BID and potassium 20 mEq BID and FU with you in clinic. She has a colonoscopy scheduled this week.             Comprehensive Metabolic Panel   Status:  Final result   Visible to patient:  No (Not Released) Dx:  Shortness of breath; Congestive heart... Order: [id number]             Ref Range & Units 12:14 PM   2wk ago   7mo ago        Glucose 65 - 99 mg/dL 101 (H) 79 67      BUN 8 - 23 mg/dL 11 14 18      Creatinine 0.57 - 1.00 mg/dL 1.06 (H) 1.11 (H) 1.19 (H)      Sodium 136 - 145 mmol/L 142 140 141      Potassium 3.5 - 5.2 mmol/L 4.5 4.3 3.9      Chloride 98 - 107 mmol/L 99 98 99      CO2 22.0 - 29.0 mmol/L 27.5 29.2 (H) 27.6      Calcium 8.6 - 10.5 mg/dL 9.9 10.4 9.6      Total Protein 6.0 - 8.5 g/dL 7.6  7.3      Albumin 3.50 - 5.20 g/dL 4.40  4.40      ALT (SGPT) 1 - 33 U/L 19  15      AST (SGOT) 1 - 32 U/L 21  21      Alkaline Phosphatase 39 - 117 U/L 89  86      Total Bilirubin 0.1 - 1.2 mg/dL 0.8  0.4      eGFR Non African Amer >60 mL/min/1.73 52 (L) 49 (L) 46 (L)       Globulin gm/dL 3.2  2.9      A/G Ratio g/dL 1.4  1.5      BUN/Creatinine Ratio 7.0 - 25.0 10.4 12.6 15.1      Anion Gap mmol/L 15.5 12.8 14.4     Resulting Agency   [facility]          Specimen Collected: 08/01/17 12:14 PM Last Resulted: 08/01/17  3:22 PM                                proBNP   Status:  Final result   Visible to patient:  No (Not Released) Dx:  Shortness of breath; Congestive heart... Order: [id number]             Ref Range & Units 12:14 PM   9mo ago   1yr ago        proBNP 5.0 - 900.0 pg/mL 5140.0 (H) 339.0R 2439.0 (H)R     Resulting Agency   [facility]  [facility]       Narrative        Among patients with dyspnea, NT-proBNP is highly sensitive for the detection of acute congestive heart failure. In addition NT-proBNP of <300 pg/ml effectively rules out acute congestive heart failure with 99% negative predictive value.            Specimen Collected: 08/01/17 12:14 PM Last Resulted: 08/01/17  3:22 PM                R=Reference range differs from displayed range                     CBC Auto Differential   Status:  Final result   Visible to patient:  No (Not Released) Dx:  Shortness of breath; Chronic obstruct... Order: [id number] - Part of Panel Order [id number]             Ref Range & Units 12:14 PM   7mo ago   9mo ago        WBC 4.50 - 10.00 10*3/mm3 8.90 8.20 10.39R      RBC 4.00 - 6.00 10*6/mm3 3.90 (C) 4.08 3.49 (L)R      Hemoglobin 12.0 - 18.0 g/dL 10.6 (L) 11.0 (L) 9.7 (L)R      Hematocrit 31.0 - 42.0 % 32.3 33.8 31.0 (L)R      RDW 4.5 - 15.0 % 14.6 15.8 (H) 14.0 (H)R      MCV 80.0 - 99.0 fL 82.8 82.9 88.8R      MCH 26.1 - 33.1 pg 27.3 26.9 27.8R      MCHC 33.0 - 37.0 g/dL 33.0 32.5 (L) 31.3 (L)R      MPV 7.1 - 10.5 fL 7.6 6.7 (L) 10.9R      Platelets 150 - 450 10*3/mm3 218 274 170R      Neutrophil % 42.0 - 75.0 % 78.6 (H) 71.7       Lymphocyte % 21.0 - 51.0 % 17.2 (L) 22.4       Monocyte % 2.0 - 9.0 % 4.2 5.9       Neutrophils, Absolute 1.40 - 6.50 10*3/mm3 7.00  (H) 5.90       Lymphocytes, Absolute 1.20 - 3.40 10*3/mm3 1.50 1.80       Monocytes, Absolute 0.10 - 0.60 10*3/mm3 0.40 0.50      Resulting Agency  [facility]          Specimen Collected: 08/01/17 12:14 PM Last Resulted: 08/01/17 12:34 PM                R=Reference range differs from displayed range                      Status of Other Orders        Order    Lab Status Result Date Provider Status       CBC & Differential Final result 8/1/2017 Open       XR Chest PA & Lateral Final result 8/1/2017 Reviewed 8/1/2017  4:10 PM              Future Expected On       Mammo Screening Bilateral With CAD 8/1/2017                  Routing History        Priority Sent On From To Message Type        8/1/2017  4:09 PM BA Burnham MD Results        8/1/2017  3:59 PM Interface, Scans Incoming BA Burnham Results        8/1/2017 12:34 PM Lab, Background User BA Burnham Results

## 2017-08-02 ENCOUNTER — TELEPHONE (OUTPATIENT)
Dept: FAMILY MEDICINE CLINIC | Facility: CLINIC | Age: 62
End: 2017-08-02

## 2017-08-02 PROBLEM — G89.29 CHRONIC LOW BACK PAIN: Status: ACTIVE | Noted: 2017-08-02

## 2017-08-02 PROBLEM — M54.50 CHRONIC LOW BACK PAIN: Status: ACTIVE | Noted: 2017-08-02

## 2017-08-02 RX ORDER — HYDROCODONE BITARTRATE AND ACETAMINOPHEN 5; 325 MG/1; MG/1
1 TABLET ORAL EVERY 8 HOURS PRN
Qty: 90 TABLET | Refills: 0 | Status: ON HOLD | OUTPATIENT
Start: 2017-08-02 | End: 2017-08-25

## 2017-08-04 ENCOUNTER — ANESTHESIA (OUTPATIENT)
Dept: GASTROENTEROLOGY | Facility: HOSPITAL | Age: 62
End: 2017-08-04

## 2017-08-04 ENCOUNTER — HOSPITAL ENCOUNTER (OUTPATIENT)
Facility: HOSPITAL | Age: 62
Setting detail: HOSPITAL OUTPATIENT SURGERY
Discharge: HOME | End: 2017-08-04
Attending: INTERNAL MEDICINE | Admitting: INTERNAL MEDICINE

## 2017-08-04 ENCOUNTER — ANESTHESIA EVENT (OUTPATIENT)
Dept: GASTROENTEROLOGY | Facility: HOSPITAL | Age: 62
End: 2017-08-04

## 2017-08-04 VITALS
BODY MASS INDEX: 26.34 KG/M2 | TEMPERATURE: 98.2 F | HEART RATE: 74 BPM | OXYGEN SATURATION: 98 % | WEIGHT: 168.19 LBS | RESPIRATION RATE: 18 BRPM | DIASTOLIC BLOOD PRESSURE: 76 MMHG | SYSTOLIC BLOOD PRESSURE: 138 MMHG

## 2017-08-04 DIAGNOSIS — K63.5 COLON POLYPS: ICD-10-CM

## 2017-08-04 LAB
INR PPP: 1.08 (ref 0.9–1.1)
PROTHROMBIN TIME: 13.6 SECONDS (ref 11.7–14.2)

## 2017-08-04 PROCEDURE — 45380 COLONOSCOPY AND BIOPSY: CPT | Performed by: INTERNAL MEDICINE

## 2017-08-04 PROCEDURE — 85610 PROTHROMBIN TIME: CPT | Performed by: INTERNAL MEDICINE

## 2017-08-04 PROCEDURE — 25010000002 PROPOFOL 10 MG/ML EMULSION: Performed by: NURSE ANESTHETIST, CERTIFIED REGISTERED

## 2017-08-04 PROCEDURE — 88305 TISSUE EXAM BY PATHOLOGIST: CPT | Performed by: INTERNAL MEDICINE

## 2017-08-04 RX ORDER — SODIUM CHLORIDE 0.9 % (FLUSH) 0.9 %
1-10 SYRINGE (ML) INJECTION AS NEEDED
Status: DISCONTINUED | OUTPATIENT
Start: 2017-08-04 | End: 2017-08-04 | Stop reason: HOSPADM

## 2017-08-04 RX ORDER — PROPOFOL 10 MG/ML
VIAL (ML) INTRAVENOUS AS NEEDED
Status: DISCONTINUED | OUTPATIENT
Start: 2017-08-04 | End: 2017-08-04 | Stop reason: SURG

## 2017-08-04 RX ORDER — SODIUM CHLORIDE 0.9 % (FLUSH) 0.9 %
3 SYRINGE (ML) INJECTION AS NEEDED
Status: DISCONTINUED | OUTPATIENT
Start: 2017-08-04 | End: 2017-08-04 | Stop reason: HOSPADM

## 2017-08-04 RX ORDER — SODIUM CHLORIDE, SODIUM LACTATE, POTASSIUM CHLORIDE, CALCIUM CHLORIDE 600; 310; 30; 20 MG/100ML; MG/100ML; MG/100ML; MG/100ML
1000 INJECTION, SOLUTION INTRAVENOUS CONTINUOUS PRN
Status: DISCONTINUED | OUTPATIENT
Start: 2017-08-04 | End: 2017-08-04 | Stop reason: HOSPADM

## 2017-08-04 RX ORDER — LIDOCAINE HYDROCHLORIDE 10 MG/ML
0.5 INJECTION, SOLUTION INFILTRATION; PERINEURAL ONCE AS NEEDED
Status: DISCONTINUED | OUTPATIENT
Start: 2017-08-04 | End: 2017-08-04 | Stop reason: HOSPADM

## 2017-08-04 RX ORDER — LIDOCAINE HYDROCHLORIDE 20 MG/ML
INJECTION, SOLUTION INFILTRATION; PERINEURAL AS NEEDED
Status: DISCONTINUED | OUTPATIENT
Start: 2017-08-04 | End: 2017-08-04 | Stop reason: SURG

## 2017-08-04 RX ORDER — PROPOFOL 10 MG/ML
VIAL (ML) INTRAVENOUS CONTINUOUS PRN
Status: DISCONTINUED | OUTPATIENT
Start: 2017-08-04 | End: 2017-08-04 | Stop reason: SURG

## 2017-08-04 RX ADMIN — LIDOCAINE HYDROCHLORIDE 50 MG: 20 INJECTION, SOLUTION INFILTRATION; PERINEURAL at 10:52

## 2017-08-04 RX ADMIN — PROPOFOL 100 MCG/KG/MIN: 10 INJECTION, EMULSION INTRAVENOUS at 10:52

## 2017-08-04 RX ADMIN — SODIUM CHLORIDE, POTASSIUM CHLORIDE, SODIUM LACTATE AND CALCIUM CHLORIDE 1000 ML: 600; 310; 30; 20 INJECTION, SOLUTION INTRAVENOUS at 08:13

## 2017-08-04 RX ADMIN — SODIUM CHLORIDE, POTASSIUM CHLORIDE, SODIUM LACTATE AND CALCIUM CHLORIDE: 600; 310; 30; 20 INJECTION, SOLUTION INTRAVENOUS at 10:51

## 2017-08-04 RX ADMIN — SODIUM CHLORIDE, POTASSIUM CHLORIDE, SODIUM LACTATE AND CALCIUM CHLORIDE: 600; 310; 30; 20 INJECTION, SOLUTION INTRAVENOUS at 11:15

## 2017-08-04 RX ADMIN — PROPOFOL 100 MG: 10 INJECTION, EMULSION INTRAVENOUS at 10:52

## 2017-08-04 NOTE — H&P
[facility]  Pre Procedure History & Physical    Chief Complaint:   Time for my colonoscopy. She has a h/o colon polyps. Her last colonoscopy was in 09/2009.    Subjective     HPI:   62 y.o. female here for a colonoscopy because she has a h/o colon polyps. Her last colonoscopy was in 09/2009.        Past Medical History:   Past Medical History:   Diagnosis Date   • Atrial fibrillation     Fani procedure   • Atrial flutter     cardioversion   • CHF (congestive heart failure)    • Colon polyp    • COPD (chronic obstructive pulmonary disease)    • Hyperlipidemia    • Hypertension    • Mitral regurgitation    • Palpitations    • Pulmonary hypertension    • Renal failure    • Tachycardia    • Valvular heart disease        Family History:  Family History   Problem Relation Age of Onset   • Adopted: Yes   • No Known Problems Mother    • No Known Problems Father        Social History:   reports that she has quit smoking. She does not have any smokeless tobacco history on file. She reports that she does not drink alcohol or use illicit drugs.    Medications:   Prescriptions Prior to Admission   Medication Sig Dispense Refill Last Dose   • atorvastatin (LIPITOR) 40 MG tablet Take 1 tablet by mouth Daily. 30 tablet 3 8/3/2017 at Unknown time   • carvedilol (COREG) 6.25 MG tablet Take 1 tablet by mouth 2 (two) times a day with meals. 60 tablet 5 8/4/2017 at Unknown time   • cyclobenzaprine (FLEXERIL) 10 MG tablet TAKE 1 TABLET BY MOUTH EVERY BEDTIME 90 tablet 3 8/3/2017 at Unknown time   • enoxaparin (LOVENOX) 40 MG/0.4ML solution syringe Inject 40 mg under the skin Every 12 (Twelve) Hours.   8/2/2017   • fluticasone-salmeterol (ADVAIR DISKUS) 250-50 MCG/DOSE DISKUS Inhale 1 puff 2 (Two) Times a Day. 3 each 3 8/4/2017 at Unknown time   • furosemide (LASIX) 20 MG tablet TAKE ONE TABLET BY MOUTH DAILY 90 tablet 1 8/4/2017 at Unknown time   • HYDROcodone-acetaminophen (NORCO) 5-325 MG per tablet Take 1  tablet by mouth Every 8 (Eight) Hours As Needed for Moderate Pain (4-6). Chronic pain medicine for low back pain 90 tablet 0 8/3/2017 at Unknown time   • lisinopril (PRINIVIL,ZESTRIL) 5 MG tablet Take 1 tablet by mouth Daily. 30 tablet 3 8/4/2017 at Unknown time   • loratadine (CLARITIN) 10 MG tablet Take 10 mg by mouth 2 (Two) Times a Day.   8/4/2017 at Unknown time   • omeprazole (priLOSEC) 40 MG capsule Take 1 capsule by mouth Daily. 90 capsule 1 8/3/2017 at Unknown time   • potassium chloride (K-DUR,KLOR-CON) 10 MEQ CR tablet Take 1 tablet by mouth 3 (Three) Times a Day. 90 tablet 3 8/3/2017 at Unknown time   • rOPINIRole (REQUIP) 2 MG tablet Take 1 tablet by mouth Every Night. 30 tablet 5 8/3/2017 at Unknown time   • sertraline (ZOLOFT) 100 MG tablet Take 1 tablet by mouth Daily. 90 tablet 1 8/4/2017 at Unknown time   • temazepam (RESTORIL) 30 MG capsule TAKE ONE CAPSULE BY MOUTH AT BEDTIME 30 capsule 2 8/3/2017 at Unknown time   • tiotropium (SPIRIVA HANDIHALER) 18 MCG per inhalation capsule Place 1 capsule into inhaler and inhale Daily. 90 capsule 3 8/4/2017 at Unknown time   • albuterol (PROVENTIL) (2.5 MG/3ML) 0.083% nebulizer solution 1 (ONE) NEBULIZED SOLN TWO TIMES DAILY, AS NEEDED  9 8/2/2017   • carvedilol (COREG) 6.25 MG tablet TAKE 1 TABLET BY MOUTH 2 (TWO) TIMES A DAY WITH MEALS. 180 tablet 1 Taking   • furosemide (LASIX) 40 MG tablet Take 1 tablet by mouth 2 (Two) Times a Day. 60 tablet 5    • meclizine (ANTIVERT) 25 MG tablet TAKE 1 TABLET EVERY 8 HOURS AS NEEDED FOR DIZZINESS  3 8/1/2017   • MULTIPLE VITAMINS PO Take by mouth daily.   8/2/2017   • potassium chloride (K-DUR,KLOR-CON) 20 MEQ CR tablet Take 1 tablet by mouth 2 (Two) Times a Day. 60 tablet 5    • warfarin (COUMADIN) 3 MG tablet Take one tablet by mouth daily or as directed 45 tablet 2 7/30/2017       Allergies:  Bupropion; Cephalexin; and Metoprolol    ROS:    Pertinent items are noted in HPI     Objective     Blood pressure 135/58,  pulse 71, temperature 98.2 °F (36.8 °C), temperature source Oral, resp. rate 18, weight 168 lb 3 oz (76.3 kg), SpO2 98 %.    Physical Exam   Constitutional: Pt is oriented to person, place, and time and well-developed, well-nourished, and in no distress.   HENT:   Mouth/Throat: Oropharynx is clear and moist.   Neck: Normal range of motion. Neck supple.   Cardiovascular: Normal rate, regular rhythm and normal heart sounds.    Pulmonary/Chest: Effort normal and breath sounds normal. No respiratory distress. No  wheezes.   Abdominal: Soft. Bowel sounds are normal.   Skin: Skin is warm and dry.   Psychiatric: Mood, memory, affect and judgment normal.     Assessment/Plan     Diagnosis:  62 y.o. female here for a colonoscopy because she has a h/o colon polyps. Her last colonoscopy was in 09/2009.        Anticipated Surgical Procedure:  Colonoscopy    The risks, benefits, and alternatives of this procedure have been discussed with the patient or the responsible party- the patient understands and agrees to proceed.

## 2017-08-04 NOTE — ANESTHESIA POSTPROCEDURE EVALUATION
Patient: Paz Browne    Procedure Summary     Date Anesthesia Start Anesthesia Stop Room / Location    08/04/17 1051 1132  [facility] /  [facility]       Procedure Diagnosis Surgeon Provider    COLONOSCOPY TO CECUM/TI WITH POLYPECTOMY ( COLD BX) (N/A ) Colon polyps  (Colon polyps [K63.5]) MD Oneil Cota MD          Anesthesia Type: MAC  Last vitals  BP   138/76 (08/04/17 1157)    Temp        Pulse   74 (08/04/17 1157)   Resp   18 (08/04/17 1157)    SpO2   98 % (08/04/17 1157)      Post Anesthesia Care and Evaluation    Patient location during evaluation: PACU  Patient participation: complete - patient participated  Level of consciousness: awake and alert  Pain management: adequate  Airway patency: patent  Anesthetic complications: No anesthetic complications    Cardiovascular status: acceptable  Respiratory status: acceptable  Hydration status: acceptable    Comments: /76  Pulse 74  Temp 36.8 °C (98.2 °F) (Oral)   Resp 18  Wt 168 lb 3 oz (76.3 kg)  SpO2 98%  BMI 26.34 kg/m2

## 2017-08-04 NOTE — PLAN OF CARE
Problem: GI Endoscopy (Adult)  Intervention: Monitor/Manage Procedure Recovery    08/04/17 0734   Respiratory Interventions   Airway/Ventilation Management airway patency maintained   Coping/Psychosocial Interventions   Environmental Support calm environment promoted   Activity   Activity Type activity adjusted per tolerance   Cardiac Interventions   Warming Thermoregulation Maintenance warm blankets applied         Goal: Signs and Symptoms of Listed Potential Problems Will be Absent or Manageable (GI Endoscopy)  Outcome: Ongoing (interventions implemented as appropriate)    08/04/17 0734   GI Endoscopy   Problems Assessed (GI Endoscopy) pain   Problems Present (GI Endoscopy) none

## 2017-08-04 NOTE — ANESTHESIA PREPROCEDURE EVALUATION
Anesthesia Evaluation     Patient summary reviewed and Nursing notes reviewed   NPO Solid Status: > 8 hours  NPO Liquid Status: > 8 hours     Airway   Mallampati: II  TM distance: <3 FB  Dental    (+) upper dentures and lower dentures    Comment: Lower partial    Pulmonary     breath sounds clear to auscultation  (+) pneumonia , a smoker Former, COPD,   Cardiovascular     Rhythm: regular    (+) hypertension, valvular problems/murmurs, dysrhythmias Atrial Fib, CHF, hyperlipidemia      Neuro/Psych  GI/Hepatic/Renal/Endo    (+)  hiatal hernia, renal disease,     Musculoskeletal     Abdominal   (+) obese,    Substance History      OB/GYN          Other   (+) arthritis                                     Anesthesia Plan    ASA 3     MAC   (On  Home O2)  intravenous induction   Anesthetic plan and risks discussed with patient.

## 2017-08-07 PROBLEM — K63.5 DYSPLASTIC POLYP OF COLON: Status: ACTIVE | Noted: 2017-08-07

## 2017-08-07 LAB
CYTO UR: NORMAL
LAB AP CASE REPORT: NORMAL
Lab: NORMAL
PATH REPORT.FINAL DX SPEC: NORMAL
PATH REPORT.GROSS SPEC: NORMAL

## 2017-08-07 NOTE — PROGRESS NOTES
Tell her that the colon polyps that were removed were not cancerous but some were precancerous.  I would recommend a repeat colonoscopy in 3 years.

## 2017-08-08 RX ORDER — OMEPRAZOLE 40 MG/1
40 CAPSULE, DELAYED RELEASE ORAL DAILY
Qty: 90 CAPSULE | Refills: 1 | Status: SHIPPED | OUTPATIENT
Start: 2017-08-08 | End: 2018-02-19 | Stop reason: SDUPTHER

## 2017-08-09 ENCOUNTER — TELEPHONE (OUTPATIENT)
Dept: GASTROENTEROLOGY | Facility: CLINIC | Age: 62
End: 2017-08-09

## 2017-08-09 ENCOUNTER — HOSPITAL ENCOUNTER (INPATIENT)
Facility: HOSPITAL | Age: 62
LOS: 16 days | Discharge: HOME HEALTH | End: 2017-08-25
Attending: EMERGENCY MEDICINE | Admitting: INTERNAL MEDICINE

## 2017-08-09 ENCOUNTER — APPOINTMENT (OUTPATIENT)
Dept: GENERAL RADIOLOGY | Facility: HOSPITAL | Age: 62
End: 2017-08-09

## 2017-08-09 ENCOUNTER — APPOINTMENT (OUTPATIENT)
Dept: CT IMAGING | Facility: HOSPITAL | Age: 62
End: 2017-08-09
Attending: EMERGENCY MEDICINE

## 2017-08-09 DIAGNOSIS — I27.20 PULMONARY HYPERTENSION (HCC): ICD-10-CM

## 2017-08-09 DIAGNOSIS — J44.1 CHRONIC OBSTRUCTIVE PULMONARY DISEASE WITH ACUTE EXACERBATION (HCC): ICD-10-CM

## 2017-08-09 DIAGNOSIS — M15.9 PRIMARY OSTEOARTHRITIS INVOLVING MULTIPLE JOINTS: ICD-10-CM

## 2017-08-09 DIAGNOSIS — J96.11 CHRONIC RESPIRATORY FAILURE WITH HYPOXIA (HCC): ICD-10-CM

## 2017-08-09 DIAGNOSIS — D64.9 ANEMIA, UNSPECIFIED TYPE: ICD-10-CM

## 2017-08-09 DIAGNOSIS — K92.2 LOWER GI BLEED: Primary | ICD-10-CM

## 2017-08-09 DIAGNOSIS — R26.2 DIFFICULTY WALKING: ICD-10-CM

## 2017-08-09 DIAGNOSIS — I50.33 ACUTE ON CHRONIC DIASTOLIC CONGESTIVE HEART FAILURE (HCC): ICD-10-CM

## 2017-08-09 DIAGNOSIS — R93.3 ABNORMAL ESOPHAGRAM: ICD-10-CM

## 2017-08-09 DIAGNOSIS — K63.5 DYSPLASTIC POLYP OF COLON: ICD-10-CM

## 2017-08-09 LAB
ABO GROUP BLD: NORMAL
ALBUMIN SERPL-MCNC: 4.5 G/DL (ref 3.5–5.2)
ALBUMIN/GLOB SERPL: 1.2 G/DL
ALP SERPL-CCNC: 93 U/L (ref 39–117)
ALT SERPL W P-5'-P-CCNC: 27 U/L (ref 1–33)
ANION GAP SERPL CALCULATED.3IONS-SCNC: 13.3 MMOL/L
AST SERPL-CCNC: 30 U/L (ref 1–32)
BASOPHILS # BLD AUTO: 0.03 10*3/MM3 (ref 0–0.2)
BASOPHILS NFR BLD AUTO: 0.4 % (ref 0–1.5)
BILIRUB SERPL-MCNC: 0.6 MG/DL (ref 0.1–1.2)
BLD GP AB SCN SERPL QL: NEGATIVE
BUN BLD-MCNC: 14 MG/DL (ref 8–23)
BUN/CREAT SERPL: 12.1 (ref 7–25)
CALCIUM SPEC-SCNC: 10.9 MG/DL (ref 8.6–10.5)
CHLORIDE SERPL-SCNC: 94 MMOL/L (ref 98–107)
CO2 SERPL-SCNC: 30.7 MMOL/L (ref 22–29)
CREAT BLD-MCNC: 1.16 MG/DL (ref 0.57–1)
D-LACTATE SERPL-SCNC: 1.2 MMOL/L (ref 0.5–2)
DEPRECATED RDW RBC AUTO: 45.3 FL (ref 37–54)
EOSINOPHIL # BLD AUTO: 0.2 10*3/MM3 (ref 0–0.7)
EOSINOPHIL NFR BLD AUTO: 2.6 % (ref 0.3–6.2)
ERYTHROCYTE [DISTWIDTH] IN BLOOD BY AUTOMATED COUNT: 14.1 % (ref 11.7–13)
GFR SERPL CREATININE-BSD FRML MDRD: 47 ML/MIN/1.73
GLOBULIN UR ELPH-MCNC: 3.8 GM/DL
GLUCOSE BLD-MCNC: 96 MG/DL (ref 65–99)
HCT VFR BLD AUTO: 36.8 % (ref 35.6–45.5)
HGB BLD-MCNC: 11.5 G/DL (ref 11.9–15.5)
HOLD SPECIMEN: NORMAL
HOLD SPECIMEN: NORMAL
IMM GRANULOCYTES # BLD: 0.03 10*3/MM3 (ref 0–0.03)
IMM GRANULOCYTES NFR BLD: 0.4 % (ref 0–0.5)
INR PPP: 1.58 (ref 0.9–1.1)
LYMPHOCYTES # BLD AUTO: 1.29 10*3/MM3 (ref 0.9–4.8)
LYMPHOCYTES NFR BLD AUTO: 16.6 % (ref 19.6–45.3)
MCH RBC QN AUTO: 27.8 PG (ref 26.9–32)
MCHC RBC AUTO-ENTMCNC: 31.3 G/DL (ref 32.4–36.3)
MCV RBC AUTO: 89.1 FL (ref 80.5–98.2)
MONOCYTES # BLD AUTO: 0.38 10*3/MM3 (ref 0.2–1.2)
MONOCYTES NFR BLD AUTO: 4.9 % (ref 5–12)
NEUTROPHILS # BLD AUTO: 5.83 10*3/MM3 (ref 1.9–8.1)
NEUTROPHILS NFR BLD AUTO: 75.1 % (ref 42.7–76)
NT-PROBNP SERPL-MCNC: 994.9 PG/ML (ref 5–900)
PLATELET # BLD AUTO: 254 10*3/MM3 (ref 140–500)
PMV BLD AUTO: 11.2 FL (ref 6–12)
POTASSIUM BLD-SCNC: 4 MMOL/L (ref 3.5–5.2)
PROT SERPL-MCNC: 8.3 G/DL (ref 6–8.5)
PROTHROMBIN TIME: 18.3 SECONDS (ref 11.7–14.2)
RBC # BLD AUTO: 4.13 10*6/MM3 (ref 3.9–5.2)
RH BLD: POSITIVE
SODIUM BLD-SCNC: 138 MMOL/L (ref 136–145)
TROPONIN T SERPL-MCNC: <0.01 NG/ML (ref 0–0.03)
TROPONIN T SERPL-MCNC: <0.01 NG/ML (ref 0–0.03)
WBC NRBC COR # BLD: 7.76 10*3/MM3 (ref 4.5–10.7)
WHOLE BLOOD HOLD SPECIMEN: NORMAL
WHOLE BLOOD HOLD SPECIMEN: NORMAL

## 2017-08-09 PROCEDURE — 86901 BLOOD TYPING SEROLOGIC RH(D): CPT | Performed by: EMERGENCY MEDICINE

## 2017-08-09 PROCEDURE — 25010000002 MORPHINE SULFATE (PF) 2 MG/ML SOLUTION: Performed by: INTERNAL MEDICINE

## 2017-08-09 PROCEDURE — 99284 EMERGENCY DEPT VISIT MOD MDM: CPT

## 2017-08-09 PROCEDURE — 83605 ASSAY OF LACTIC ACID: CPT | Performed by: EMERGENCY MEDICINE

## 2017-08-09 PROCEDURE — 93005 ELECTROCARDIOGRAM TRACING: CPT | Performed by: INTERNAL MEDICINE

## 2017-08-09 PROCEDURE — 71020 HC CHEST PA AND LATERAL: CPT

## 2017-08-09 PROCEDURE — 86900 BLOOD TYPING SEROLOGIC ABO: CPT | Performed by: EMERGENCY MEDICINE

## 2017-08-09 PROCEDURE — 85610 PROTHROMBIN TIME: CPT | Performed by: EMERGENCY MEDICINE

## 2017-08-09 PROCEDURE — 84484 ASSAY OF TROPONIN QUANT: CPT | Performed by: INTERNAL MEDICINE

## 2017-08-09 PROCEDURE — 85025 COMPLETE CBC W/AUTO DIFF WBC: CPT | Performed by: EMERGENCY MEDICINE

## 2017-08-09 PROCEDURE — 94799 UNLISTED PULMONARY SVC/PX: CPT

## 2017-08-09 PROCEDURE — 80053 COMPREHEN METABOLIC PANEL: CPT | Performed by: EMERGENCY MEDICINE

## 2017-08-09 PROCEDURE — 83880 ASSAY OF NATRIURETIC PEPTIDE: CPT | Performed by: INTERNAL MEDICINE

## 2017-08-09 PROCEDURE — 99222 1ST HOSP IP/OBS MODERATE 55: CPT | Performed by: INTERNAL MEDICINE

## 2017-08-09 PROCEDURE — 74176 CT ABD & PELVIS W/O CONTRAST: CPT

## 2017-08-09 PROCEDURE — 99232 SBSQ HOSP IP/OBS MODERATE 35: CPT | Performed by: INTERNAL MEDICINE

## 2017-08-09 PROCEDURE — 86850 RBC ANTIBODY SCREEN: CPT | Performed by: EMERGENCY MEDICINE

## 2017-08-09 PROCEDURE — 93010 ELECTROCARDIOGRAM REPORT: CPT | Performed by: INTERNAL MEDICINE

## 2017-08-09 RX ORDER — ROPINIROLE 2 MG/1
2 TABLET, FILM COATED ORAL NIGHTLY
Status: DISCONTINUED | OUTPATIENT
Start: 2017-08-09 | End: 2017-08-25 | Stop reason: HOSPADM

## 2017-08-09 RX ORDER — ONDANSETRON 2 MG/ML
4 INJECTION INTRAMUSCULAR; INTRAVENOUS EVERY 6 HOURS PRN
Status: DISCONTINUED | OUTPATIENT
Start: 2017-08-09 | End: 2017-08-25 | Stop reason: HOSPADM

## 2017-08-09 RX ORDER — MECLIZINE HYDROCHLORIDE 25 MG/1
25 TABLET ORAL 3 TIMES DAILY PRN
COMMUNITY
End: 2020-10-06 | Stop reason: SDUPTHER

## 2017-08-09 RX ORDER — MULTIPLE VITAMINS W/ MINERALS TAB 9MG-400MCG
1 TAB ORAL DAILY
Status: DISCONTINUED | OUTPATIENT
Start: 2017-08-09 | End: 2017-08-25 | Stop reason: HOSPADM

## 2017-08-09 RX ORDER — CYCLOBENZAPRINE HCL 10 MG
10 TABLET ORAL NIGHTLY
Status: DISCONTINUED | OUTPATIENT
Start: 2017-08-09 | End: 2017-08-25 | Stop reason: HOSPADM

## 2017-08-09 RX ORDER — SODIUM CHLORIDE 0.9 % (FLUSH) 0.9 %
1-10 SYRINGE (ML) INJECTION AS NEEDED
Status: DISCONTINUED | OUTPATIENT
Start: 2017-08-09 | End: 2017-08-25 | Stop reason: HOSPADM

## 2017-08-09 RX ORDER — LISINOPRIL 5 MG/1
5 TABLET ORAL DAILY
Status: DISCONTINUED | OUTPATIENT
Start: 2017-08-09 | End: 2017-08-10

## 2017-08-09 RX ORDER — CARVEDILOL 6.25 MG/1
6.25 TABLET ORAL 2 TIMES DAILY WITH MEALS
Status: DISCONTINUED | OUTPATIENT
Start: 2017-08-09 | End: 2017-08-10

## 2017-08-09 RX ORDER — HYDROCODONE BITARTRATE AND ACETAMINOPHEN 5; 325 MG/1; MG/1
1 TABLET ORAL EVERY 4 HOURS PRN
Status: DISCONTINUED | OUTPATIENT
Start: 2017-08-09 | End: 2017-08-25 | Stop reason: HOSPADM

## 2017-08-09 RX ORDER — POLYETHYLENE GLYCOL 3350 17 G/17G
119 POWDER, FOR SOLUTION ORAL 2 TIMES DAILY
Status: COMPLETED | OUTPATIENT
Start: 2017-08-09 | End: 2017-08-10

## 2017-08-09 RX ORDER — CYCLOBENZAPRINE HCL 10 MG
10 TABLET ORAL NIGHTLY
COMMUNITY
End: 2020-01-06

## 2017-08-09 RX ORDER — ACETAMINOPHEN 325 MG/1
650 TABLET ORAL EVERY 6 HOURS PRN
Status: DISCONTINUED | OUTPATIENT
Start: 2017-08-09 | End: 2017-08-25 | Stop reason: HOSPADM

## 2017-08-09 RX ORDER — ATORVASTATIN CALCIUM 20 MG/1
40 TABLET, FILM COATED ORAL DAILY
Status: DISCONTINUED | OUTPATIENT
Start: 2017-08-09 | End: 2017-08-25 | Stop reason: HOSPADM

## 2017-08-09 RX ORDER — ALBUTEROL SULFATE 2.5 MG/3ML
2.5 SOLUTION RESPIRATORY (INHALATION) 2 TIMES DAILY PRN
Status: DISCONTINUED | OUTPATIENT
Start: 2017-08-09 | End: 2017-08-18

## 2017-08-09 RX ORDER — SERTRALINE HYDROCHLORIDE 100 MG/1
100 TABLET, FILM COATED ORAL DAILY
Status: DISCONTINUED | OUTPATIENT
Start: 2017-08-09 | End: 2017-08-25 | Stop reason: HOSPADM

## 2017-08-09 RX ORDER — NALOXONE HCL 0.4 MG/ML
0.4 VIAL (ML) INJECTION
Status: DISCONTINUED | OUTPATIENT
Start: 2017-08-09 | End: 2017-08-25 | Stop reason: HOSPADM

## 2017-08-09 RX ORDER — TEMAZEPAM 30 MG/1
30 CAPSULE ORAL NIGHTLY PRN
COMMUNITY
End: 2017-11-12 | Stop reason: SDUPTHER

## 2017-08-09 RX ORDER — MECLIZINE HYDROCHLORIDE 25 MG/1
25 TABLET ORAL 3 TIMES DAILY PRN
Status: DISCONTINUED | OUTPATIENT
Start: 2017-08-09 | End: 2017-08-25 | Stop reason: HOSPADM

## 2017-08-09 RX ORDER — MORPHINE SULFATE 2 MG/ML
2 INJECTION, SOLUTION INTRAMUSCULAR; INTRAVENOUS EVERY 4 HOURS PRN
Status: DISPENSED | OUTPATIENT
Start: 2017-08-09 | End: 2017-08-19

## 2017-08-09 RX ORDER — TEMAZEPAM 15 MG/1
30 CAPSULE ORAL NIGHTLY PRN
Status: DISCONTINUED | OUTPATIENT
Start: 2017-08-09 | End: 2017-08-25 | Stop reason: HOSPADM

## 2017-08-09 RX ORDER — CETIRIZINE HYDROCHLORIDE 10 MG/1
10 TABLET ORAL DAILY
Status: DISCONTINUED | OUTPATIENT
Start: 2017-08-09 | End: 2017-08-25 | Stop reason: HOSPADM

## 2017-08-09 RX ORDER — WARFARIN SODIUM 3 MG/1
6 TABLET ORAL SEE ADMIN INSTRUCTIONS
COMMUNITY
End: 2017-08-25 | Stop reason: HOSPADM

## 2017-08-09 RX ORDER — NITROGLYCERIN 0.4 MG/1
0.4 TABLET SUBLINGUAL
Status: DISCONTINUED | OUTPATIENT
Start: 2017-08-09 | End: 2017-08-15

## 2017-08-09 RX ORDER — SODIUM CHLORIDE 0.9 % (FLUSH) 0.9 %
10 SYRINGE (ML) INJECTION AS NEEDED
Status: DISCONTINUED | OUTPATIENT
Start: 2017-08-09 | End: 2017-08-25 | Stop reason: HOSPADM

## 2017-08-09 RX ORDER — PANTOPRAZOLE SODIUM 40 MG/1
40 TABLET, DELAYED RELEASE ORAL EVERY MORNING
Status: DISCONTINUED | OUTPATIENT
Start: 2017-08-10 | End: 2017-08-25 | Stop reason: HOSPADM

## 2017-08-09 RX ORDER — ALBUTEROL SULFATE 2.5 MG/3ML
2.5 SOLUTION RESPIRATORY (INHALATION) 2 TIMES DAILY PRN
Status: ON HOLD | COMMUNITY
End: 2017-08-25

## 2017-08-09 RX ORDER — BISACODYL 10 MG
10 SUPPOSITORY, RECTAL RECTAL DAILY PRN
Status: DISCONTINUED | OUTPATIENT
Start: 2017-08-09 | End: 2017-08-25 | Stop reason: HOSPADM

## 2017-08-09 RX ORDER — CALCIUM CARBONATE 200(500)MG
1 TABLET,CHEWABLE ORAL 2 TIMES DAILY PRN
Status: DISCONTINUED | OUTPATIENT
Start: 2017-08-09 | End: 2017-08-25 | Stop reason: HOSPADM

## 2017-08-09 RX ORDER — BUDESONIDE AND FORMOTEROL FUMARATE DIHYDRATE 160; 4.5 UG/1; UG/1
2 AEROSOL RESPIRATORY (INHALATION)
Status: DISCONTINUED | OUTPATIENT
Start: 2017-08-09 | End: 2017-08-25 | Stop reason: HOSPADM

## 2017-08-09 RX ADMIN — MULTIPLE VITAMINS W/ MINERALS TAB 1 TABLET: TAB at 22:18

## 2017-08-09 RX ADMIN — SERTRALINE 100 MG: 100 TABLET, FILM COATED ORAL at 22:19

## 2017-08-09 RX ADMIN — CETIRIZINE HYDROCHLORIDE 10 MG: 10 TABLET, FILM COATED ORAL at 22:18

## 2017-08-09 RX ADMIN — MORPHINE SULFATE 2 MG: 2 INJECTION, SOLUTION INTRAMUSCULAR; INTRAVENOUS at 16:33

## 2017-08-09 RX ADMIN — POLYETHYLENE GLYCOL 3350 119 G: 17 POWDER, FOR SOLUTION ORAL at 22:04

## 2017-08-09 RX ADMIN — ROPINIROLE 2 MG: 2 TABLET, FILM COATED ORAL at 22:18

## 2017-08-09 RX ADMIN — CARVEDILOL 6.25 MG: 6.25 TABLET, FILM COATED ORAL at 22:18

## 2017-08-09 RX ADMIN — NITROGLYCERIN 1 INCH: 20 OINTMENT TOPICAL at 20:06

## 2017-08-09 RX ADMIN — CYCLOBENZAPRINE HYDROCHLORIDE 10 MG: 10 TABLET, FILM COATED ORAL at 22:19

## 2017-08-09 RX ADMIN — LISINOPRIL 5 MG: 5 TABLET ORAL at 22:18

## 2017-08-09 RX ADMIN — ATORVASTATIN CALCIUM 40 MG: 20 TABLET, FILM COATED ORAL at 22:17

## 2017-08-09 RX ADMIN — MORPHINE SULFATE 2 MG: 2 INJECTION, SOLUTION INTRAMUSCULAR; INTRAVENOUS at 21:47

## 2017-08-09 NOTE — CONSULTS
Date of Hospital Visit: [unfilled]ENC@  Encounter Provider: Kirti Arteaga MD  Place of Service: [facility] CARDIOLOGY  Patient Name: Paz Browne  :1955  Referral Provider: Tata Newby,*    Chief complaint    History of Present Illness    Paz Browne is a 62-year-old female who follows with Dr. Gan.  She presented to the hospital 2014 with progressive dyspnea and had atrial fibrillation with rapid ventricular response as well as congestive heart failure.  An echocardiogram at that time showed normal left ventricular systolic function with severe left atrial dilation and severe mitral and tricuspid insufficiency with severe pulmonary hypertension.  She was diuresed and rate controlled.  She had a CHIO which confirmed severe valvular disease.  She had a cardiac catheterization showing no coronary artery disease.  She underwent urgent mitral valve repair using a #26 Physio ring annuloplasty.  This was unsuccessful.  She then had the mitral valve placed with a #31 porcine [employer] Epic prosthesis.  She had a tricuspid valve repair as well as a right and left cryo-maze and closure of left atrial appendage.  Postoperatively she was bradycardic and was not on antiarrhythmics and went back in atrial fibrillation.  She did not maintain sinus rhythm and so was started on warfarin.     In , she presented with palpitation and was found to be in atrial flutter.  She was started on amiodarone and then cardioverted in 2014.    She had an echocardiogram done 2016 showing ejection fraction 57% with moderate right ventricular dilation and decreased right ventricular systolic function.  There is severe pulmonary hypertension.  Her prosthetic mitral valve is grossly normal.  She had a cardiac catheterization done 2016 showing 30% mid RCA stenosis, 30% mid circumflex stenosis, 20% mid LAD stenosis.  The left main coronary artery was normal.  Her  ejection fraction was 70% without any evidence of mitral insufficiency.  Her right heart pressures showed a PA pressure of 51/17 with a mean of 31 mmHg.  Her pulmonary capillary wedge pressure was 13. Her RV Pressure Is 52/7.  Her RA Pressure Was 7.  Her Left Ventricular End-Diastolic Pressure was 11.  Her PAP did not improve with adenosine though it increased her cardiac output. She was seen by Dr. Melo of pulmonary and was felt that her issues were predominantly due to her underlying lung disease.  [facility] did go [facility] they told her that they felt she had 12-18 months without transplant but at most 5 years with transplant with the risks high.  She's opted not to pursue that.     She presented to the ER with rectal bleeding.  She had a colonoscopy done 8/4/2017 showing noncancerous colonic polyps which were removed.  Her Coumadin was discontinued for the colonoscopy but she was started on Lovenox.  She's had 2 days of bright red blood per rectum.    At home she said she had resumed her warfarin and was passing clots in her stool for the last couple of days.  She's been short winded and somewhat dizzy.  She had chest tightness couple minutes ago.  The chest tightness is currently gone.  She is not having as at home.  She hasn't had heart racing or skipping.  She's had no syncope.  She has no fevers or chills.  She has no cough.  She saw Dr. Martinez last week.  When she saw him, her BNP was elevated and her hemoglobin was 10.        Past Medical History:   Diagnosis Date   • Atrial fibrillation     Fani procedure   • Atrial flutter     cardioversion   • CHF (congestive heart failure)    • Colon polyp    • COPD (chronic obstructive pulmonary disease)    • Hyperlipidemia    • Hypertension    • Mitral regurgitation    • Palpitations    • Pulmonary hypertension    • Renal failure    • Tachycardia    • Valvular heart disease        Past Surgical History:   Procedure Laterality  Date   • CARDIAC CATHETERIZATION  09/01/2014    Right dominant systemt, normal coronary arteries.    • CARDIAC CATHETERIZATION Left 6/10/2016    Procedure: Cardiac catheterization;  Surgeon: Sergei Hall MD;  [facility];  Service:    • CARDIAC CATHETERIZATION N/A 6/10/2016    Procedure: Right Heart Cath;  Surgeon: Sergei Hall MD;  Location: [facility] CATH INVASIVE LOCATION;  Service:    • COLONOSCOPY     • COLONOSCOPY N/A 8/4/2017    Procedure: COLONOSCOPY TO CECUM/TI WITH POLYPECTOMY ( COLD BX);  Surgeon: Cleveland Devine MD;  Location: [facility] ENDOSCOPY;  Service:    • KIDNEY SURGERY  04/22/2013   • MAZE PROCEDURE     • MITRAL VALVE REPLACEMENT     • TRICUSPID VALVE REPLACEMENT         Prescriptions Prior to Admission   Medication Sig Dispense Refill Last Dose   • albuterol (PROVENTIL) (2.5 MG/3ML) 0.083% nebulizer solution Take 2.5 mg by nebulization 2 (Two) Times a Day As Needed for Wheezing.      • atorvastatin (LIPITOR) 40 MG tablet Take 1 tablet by mouth Daily. 30 tablet 3 8/3/2017 at Unknown time   • carvedilol (COREG) 6.25 MG tablet Take 1 tablet by mouth 2 (two) times a day with meals. 60 tablet 5 8/4/2017 at Unknown time   • cyclobenzaprine (FLEXERIL) 10 MG tablet Take 10 mg by mouth Every Night.      • enoxaparin (LOVENOX) 40 MG/0.4ML solution syringe Inject 40 mg under the skin Every 12 (Twelve) Hours.   8/2/2017   • fluticasone-salmeterol (ADVAIR DISKUS) 250-50 MCG/DOSE DISKUS Inhale 1 puff 2 (Two) Times a Day. 3 each 3 8/4/2017 at Unknown time   • furosemide (LASIX) 40 MG tablet Take 1 tablet by mouth 2 (Two) Times a Day. 60 tablet 5    • HYDROcodone-acetaminophen (NORCO) 5-325 MG per tablet Take 1 tablet by mouth Every 8 (Eight) Hours As Needed for Moderate Pain (4-6). Chronic pain medicine for low back pain 90 tablet 0 8/3/2017 at Unknown time   • lisinopril (PRINIVIL,ZESTRIL) 5 MG tablet Take 1 tablet by mouth Daily. 30 tablet 3 8/4/2017 at Unknown time   • loratadine (CLARITIN)  10 MG tablet Take 10 mg by mouth 2 (Two) Times a Day.   8/4/2017 at Unknown time   • meclizine (ANTIVERT) 25 MG tablet Take 25 mg by mouth 3 (Three) Times a Day As Needed for dizziness.      • omeprazole (priLOSEC) 40 MG capsule Take 1 capsule by mouth Daily. 90 capsule 1    • potassium chloride (K-DUR,KLOR-CON) 20 MEQ CR tablet Take 1 tablet by mouth 2 (Two) Times a Day. 60 tablet 5    • rOPINIRole (REQUIP) 2 MG tablet Take 1 tablet by mouth Every Night. 30 tablet 5 8/3/2017 at Unknown time   • sertraline (ZOLOFT) 100 MG tablet Take 1 tablet by mouth Daily. 90 tablet 1 8/4/2017 at Unknown time   • temazepam (RESTORIL) 30 MG capsule Take 30 mg by mouth At Night As Needed for Sleep.      • tiotropium (SPIRIVA HANDIHALER) 18 MCG per inhalation capsule Place 1 capsule into inhaler and inhale Daily. 90 capsule 3 8/4/2017 at Unknown time   • warfarin (COUMADIN) 3 MG tablet Take one tablet by mouth daily or as directed 45 tablet 2 7/30/2017       Current Meds  Scheduled Meds:   Continuous Infusions:   PRN Meds:.•  acetaminophen  •  bisacodyl  •  calcium carbonate  •  magnesium hydroxide  •  Morphine **AND** naloxone  •  nitroglycerin  •  ondansetron  •  sodium chloride  •  sodium chloride    Allergies as of 08/09/2017 - Luis A as Reviewed 08/09/2017   Allergen Reaction Noted   • Bupropion  02/18/2016   • Cephalexin  02/18/2016   • Metoprolol  02/18/2016       Social History     Social History   • Marital status:      Spouse name: N/A   • Number of children: N/A   • Years of education: N/A     Occupational History   • Not on file.     Social History Main Topics   • Smoking status: Former Smoker   • Smokeless tobacco: Not on file   • Alcohol use No      Comment: caffine use   • Drug use: No   • Sexual activity: Defer     Other Topics Concern   • Not on file     Social History Narrative       Family History   Problem Relation Age of Onset   • Adopted: Yes   • No Known Problems Mother    • No Known Problems Father   "      REVIEW OF SYSTEMS:   12 point ROS was performed and is negative except as outlined in HPI        Objective:   Temp:  [97.8 °F (36.6 °C)-99.3 °F (37.4 °C)] 97.8 °F (36.6 °C)  Heart Rate:  [73-97] 83  Resp:  [18-22] 18  BP: (151-175)/() 175/85  Body mass index is 26 kg/(m^2).  Flowsheet Rows         First Filed Value    Admission Height  67\" (170.2 cm) Documented at 08/09/2017 1152    Admission Weight  166 lb (75.3 kg) Documented at 08/09/2017 1152        Vitals:    08/09/17 1603   BP: 175/85   Pulse: 83   Resp: 18   Temp: 97.8 °F (36.6 °C)   SpO2: 92%       General Appearance:    Alert, cooperative, in no acute distress   Head:    Normocephalic, without obvious abnormality, atraumatic   Eyes:            Lids and lashes normal, conjunctivae and sclerae normal, no   icterus, no pallor, corneas clear, PERRLA   Ears:    Ears appear intact with no abnormalities noted   Throat:   No oral lesions, no thrush, oral mucosa moist   Neck:   No adenopathy, supple, trachea midline, no thyromegaly, no   carotid bruit, no JVD   Back:     No kyphosis present, no scoliosis present, no skin lesions, erythema or scars, no tenderness, range of motion normal   Lungs:     Clear to auscultation,respirations regular, even and unlabored    Heart:    Regular rhythm and normal rate, normal S1 and S2, 3/6 STEPHANE no gallop, no rub, no click   Chest Wall:    No abnormalities observed   Abdomen:     Normal bowel sounds, no masses, no organomegaly, soft        non-tender, non-distended, no guarding, no rebound  tenderness   Extremities:   Moves all extremities well, no edema, no cyanosis, no redness   Pulses:   Pulses palpable and equal bilaterally. Normal radial, carotid, dorsalis pedis and posterior tibial pulses bilaterally.   Skin:  Psychiatric:   No bleeding, bruising or rash    Alert and oriented x 3, normal mood and affect           Lab Review:      Results from last 7 days  Lab Units 08/09/17  1206   SODIUM mmol/L 138   POTASSIUM " mmol/L 4.0   CHLORIDE mmol/L 94*   CO2 mmol/L 30.7*   BUN mg/dL 14   CREATININE mg/dL 1.16*   CALCIUM mg/dL 10.9*   BILIRUBIN mg/dL 0.6   ALK PHOS U/L 93   ALT (SGPT) U/L 27   AST (SGOT) U/L 30   GLUCOSE mg/dL 96       Results from last 7 days  Lab Units 08/09/17  1206   TROPONIN T ng/mL <0.010       Results from last 7 days  Lab Units 08/09/17  1206   WBC 10*3/mm3 7.76   HEMOGLOBIN g/dL 11.5*   HEMATOCRIT % 36.8   PLATELETS 10*3/mm3 254       Results from last 7 days  Lab Units 08/09/17  1207 08/04/17  0948   INR  1.58* 1.08             I personally viewed and interpreted the patient's EKG/Telemetry data  )  Patient Active Problem List   Diagnosis   • Mitral valve replaced   • Atrial fibrillation [I48.91]   • Valvular heart disease   • Tachycardia   • Renal failure   • Palpitations   • Hypertension   • Hyperlipidemia   • COPD (chronic obstructive pulmonary disease)   • Mitral regurgitation   • Pain medication agreement   • Hiatal hernia   • Primary osteoarthritis involving multiple joints   • Heart failure   • Pulmonary hypertension   • S/P TVR (tricuspid valve repair)   • Long term current use of anticoagulant   • Pulmonary nodule   • Aspiration pneumonia   • Hemoptysis   • RLS (restless legs syndrome)   • Chronic low back pain   • Dysplastic polyp of colon   • Lower GI bleed     Assessment and Plan:  1. Rectal bleeding.  2. Tissue MVR and TV repair.   3. paf - current NSR  4. Severe pulmonary HTN.   5. Hypertension.   6. Chest pain - ECG unchanged and troponin negative, will recheck.     Hold AC, use ntg.  Check troponin, will follow.     Kirti Arteaga MD  08/09/17  5:06 PM.  Time spent in reviewing chart, discussion and examination:

## 2017-08-09 NOTE — PROGRESS NOTES
Clinical Pharmacy Services: Medication History/Medication Reconciliation    Agree with medication history and documentation as performed by Claudia Leos PharmD Candidate.    Niocle Cuello PharmD, [facility]  Clinical Pharmacy Specialist, Emergency Medicine  Work  Phone: [phone]  ______________________________________________________________________________________________________    Clinical Pharmacy Services: Medication History    Paz Browne is a 62 y.o. female presenting to [facility] with chief complaint of: abdominal bleed.    Past Medical History:  Past Medical History:   Diagnosis Date   • Atrial fibrillation     Fani procedure   • Atrial flutter     cardioversion   • CHF (congestive heart failure)    • Colon polyp    • COPD (chronic obstructive pulmonary disease)    • Hyperlipidemia    • Hypertension    • Mitral regurgitation    • Palpitations    • Pulmonary hypertension    • Renal failure    • Tachycardia    • Valvular heart disease        Allergies   Allergen Reactions   • Bupropion    • Cephalexin    • Metoprolol        Medication information was obtained from: Patients reported medication list as well as patients pharmacy.    Pharmacy and Phone Number: [phone]    Medication notes:     Patient reports that she is taking both warfarin and enoxaparin under direction from her doctor/surgeon. The plan moving forward with these medications in the in-patient setting was unclear from the patient. She advised me that she has been taking warfarin 6 mg nightly on Monday and Tuesdays. She took both of those doses this week. She also informed me that she has received an enoxaparin shot this morning prior to admit. (8/9/17).    Current Outpatient Medications:    Prior to Admission medications    Medication Sig Start Date End Date Taking? Authorizing Provider   albuterol (PROVENTIL) (2.5 MG/3ML) 0.083% nebulizer solution Take 2.5 mg by nebulization 2 (Two) Times a Day As  Needed for Wheezing.   Yes Historical Provider, MD   atorvastatin (LIPITOR) 40 MG tablet Take 1 tablet by mouth Daily. 4/4/17  Yes Javan Martinez MD   carvedilol (COREG) 6.25 MG tablet Take 1 tablet by mouth 2 (two) times a day with meals. 5/2/16  Yes Earnest Gan MD   cyclobenzaprine (FLEXERIL) 10 MG tablet Take 10 mg by mouth Every Night.   Yes Historical Provider, MD   enoxaparin (LOVENOX) 40 MG/0.4ML solution syringe Inject 40 mg under the skin Every 12 (Twelve) Hours.   Yes Historical Provider, MD   fluticasone-salmeterol (ADVAIR DISKUS) 250-50 MCG/DOSE DISKUS Inhale 1 puff 2 (Two) Times a Day. 5/2/17  Yes Javan Martinez MD   furosemide (LASIX) 40 MG tablet Take 1 tablet by mouth 2 (Two) Times a Day. 8/1/17  Yes BA Burnham   HYDROcodone-acetaminophen (NORCO) 5-325 MG per tablet Take 1 tablet by mouth Every 8 (Eight) Hours As Needed for Moderate Pain (4-6). Chronic pain medicine for low back pain 8/2/17  Yes Javan Martinez MD   lisinopril (PRINIVIL,ZESTRIL) 5 MG tablet Take 1 tablet by mouth Daily. 6/7/17  Yes Javan Martinez MD   loratadine (CLARITIN) 10 MG tablet Take 10 mg by mouth 2 (Two) Times a Day.   Yes Historical Provider, MD   meclizine (ANTIVERT) 25 MG tablet Take 25 mg by mouth 3 (Three) Times a Day As Needed for dizziness.   Yes Historical Provider, MD   omeprazole (priLOSEC) 40 MG capsule Take 1 capsule by mouth Daily. 8/8/17  Yes Javan Martinez MD   potassium chloride (K-DUR,KLOR-CON) 20 MEQ CR tablet Take 1 tablet by mouth 2 (Two) Times a Day. 8/1/17  Yes BA Burnham   rOPINIRole (REQUIP) 2 MG tablet Take 1 tablet by mouth Every Night. 8/1/17  Yes BA Burnham   sertraline (ZOLOFT) 100 MG tablet Take 1 tablet by mouth Daily. 5/2/17  Yes Javan Martinez MD   temazepam (RESTORIL) 30 MG capsule Take 30 mg by mouth At Night As Needed for Sleep.   Yes Historical Provider, MD   tiotropium (SPIRIVA HANDIHALER) 18 MCG per inhalation capsule  Place 1 capsule into inhaler and inhale Daily. 5/2/17  Yes Javan Martinez MD   warfarin (COUMADIN) 3 MG tablet Take one tablet by mouth daily or as directed 6/21/17  Yes Earnest Gan MD     This medication list is complete to the best of my knowledge as of 8/9/2017    Please call if questions.     Claudia Leos, Student Pharmacist

## 2017-08-09 NOTE — TELEPHONE ENCOUNTER
Advise per DR Devine that colon polyps that were removed were not cancerous, but some were precancerous.  A repeat c/s is recommended in 3 yrs.  Rachel verb understanding.    C/s for 8/4/20 placed in recall.

## 2017-08-09 NOTE — TELEPHONE ENCOUNTER
----- Message from Cleveland Devine MD sent at 8/7/2017  5:03 PM EDT -----  Tell her that the colon polyps that were removed were not cancerous but some were precancerous.  I would recommend a repeat colonoscopy in 3 years.

## 2017-08-09 NOTE — CONSULTS
[facility]  Initial Inpatient Consult Note    Referring Provider: Dr. Javan Martienz, Dr. Earnest Gan    Reason for Consultation: Post polypectomy bleeding    Subjective     History of present illness:    62 y.o. female of Dr. Javan Martinez had undergone colonoscopic examination by Dr. Cleveland Devine on August 4 with 11 polyps removed by cold biopsy.  She has heart valve disease with tricuspid valve repair and requires long-term anticoagulant therapy.  She was transitioned from Coumadin to Lovenox just prior to her examination and had resumed her Lovenox therapy as well as Coumadin in the postprocedure setting.  Current INR is 1.58.  Hemoglobin checked on August 1 was 10.6, current hemoglobin is 11.5.  She had experienced hematochezia and passage of blood clots yesterday and again today as well as some associated abdominal cramping and rectal spasms.  She has recently been treated with diuretics for congestive heart issues.    Past Medical History:  Past Medical History:   Diagnosis Date   • Atrial fibrillation     Fani procedure   • Atrial flutter     cardioversion   • CHF (congestive heart failure)    • Colon polyp    • COPD (chronic obstructive pulmonary disease)    • Hyperlipidemia    • Hypertension    • Mitral regurgitation    • Palpitations    • Pulmonary hypertension    • Renal failure    • Tachycardia    • Valvular heart disease      Past Surgical History:  Past Surgical History:   Procedure Laterality Date   • CARDIAC CATHETERIZATION  09/01/2014    Right dominant systemt, normal coronary arteries.    • CARDIAC CATHETERIZATION Left 6/10/2016    Procedure: Cardiac catheterization;  Surgeon: Sergei Hall MD;  Location:  [facility] CATH INVASIVE LOCATION;  Service:    • CARDIAC CATHETERIZATION N/A 6/10/2016    Procedure: Right Heart Cath;  Surgeon: Sergei Hall MD;  Location:  [facility] CATH INVASIVE LOCATION;  Service:    • COLONOSCOPY     • COLONOSCOPY N/A 8/4/2017    Procedure: COLONOSCOPY TO  CECUM/TI WITH POLYPECTOMY ( COLD BX);  Surgeon: Cleveland Devine MD;  Location: [facility] ENDOSCOPY;  Service:    • KIDNEY SURGERY  04/22/2013   • MAZE PROCEDURE     • MITRAL VALVE REPLACEMENT     • TRICUSPID VALVE REPLACEMENT        Social History:   Social History   Substance Use Topics   • Smoking status: Former Smoker   • Smokeless tobacco: Not on file   • Alcohol use No      Comment: caffine use      Family History:  Family History   Problem Relation Age of Onset   • Adopted: Yes   • No Known Problems Mother    • No Known Problems Father        Home Meds:  Prescriptions Prior to Admission   Medication Sig Dispense Refill Last Dose   • albuterol (PROVENTIL) (2.5 MG/3ML) 0.083% nebulizer solution Take 2.5 mg by nebulization 2 (Two) Times a Day As Needed for Wheezing.      • atorvastatin (LIPITOR) 40 MG tablet Take 1 tablet by mouth Daily. 30 tablet 3 8/3/2017 at Unknown time   • carvedilol (COREG) 6.25 MG tablet Take 1 tablet by mouth 2 (two) times a day with meals. 60 tablet 5 8/4/2017 at Unknown time   • cyclobenzaprine (FLEXERIL) 10 MG tablet Take 10 mg by mouth Every Night.      • fluticasone-salmeterol (ADVAIR DISKUS) 250-50 MCG/DOSE DISKUS Inhale 1 puff 2 (Two) Times a Day. 3 each 3 8/4/2017 at Unknown time   • furosemide (LASIX) 40 MG tablet Take 1 tablet by mouth 2 (Two) Times a Day. 60 tablet 5    • meclizine (ANTIVERT) 25 MG tablet Take 25 mg by mouth 3 (Three) Times a Day As Needed for dizziness.      • temazepam (RESTORIL) 30 MG capsule Take 30 mg by mouth At Night As Needed for Sleep.      • enoxaparin (LOVENOX) 40 MG/0.4ML solution syringe Inject 40 mg under the skin Every 12 (Twelve) Hours.   8/2/2017   • HYDROcodone-acetaminophen (NORCO) 5-325 MG per tablet Take 1 tablet by mouth Every 8 (Eight) Hours As Needed for Moderate Pain (4-6). Chronic pain medicine for low back pain 90 tablet 0 8/3/2017 at Unknown time   • lisinopril (PRINIVIL,ZESTRIL) 5 MG tablet Take 1 tablet by mouth Daily. 30 tablet 3  8/4/2017 at Unknown time   • loratadine (CLARITIN) 10 MG tablet Take 10 mg by mouth 2 (Two) Times a Day.   8/4/2017 at Unknown time   • omeprazole (priLOSEC) 40 MG capsule Take 1 capsule by mouth Daily. 90 capsule 1    • potassium chloride (K-DUR,KLOR-CON) 20 MEQ CR tablet Take 1 tablet by mouth 2 (Two) Times a Day. 60 tablet 5    • rOPINIRole (REQUIP) 2 MG tablet Take 1 tablet by mouth Every Night. 30 tablet 5 8/3/2017 at Unknown time   • sertraline (ZOLOFT) 100 MG tablet Take 1 tablet by mouth Daily. 90 tablet 1 8/4/2017 at Unknown time   • tiotropium (SPIRIVA HANDIHALER) 18 MCG per inhalation capsule Place 1 capsule into inhaler and inhale Daily. 90 capsule 3 8/4/2017 at Unknown time   • warfarin (COUMADIN) 3 MG tablet Take one tablet by mouth daily or as directed 45 tablet 2 7/30/2017     Current Meds:      Allergies:  Allergies   Allergen Reactions   • Bupropion    • Cephalexin    • Metoprolol      Review of Systems  All systems were reviewed and negative except for:  Respiratory: positive for  shortness of air  Gastrointestinal: postitive for  bright red blood per rectum     Objective     Vital Signs  Temp:  [97.8 °F (36.6 °C)-99.3 °F (37.4 °C)] 97.8 °F (36.6 °C)  Heart Rate:  [73-97] 83  Resp:  [18-22] 18  BP: (151-175)/() 175/85  Physical Exam:  General Appearance:    Alert, cooperative, in no acute distress   Head:    Normocephalic, without obvious abnormality, atraumatic   Eyes:            Lids and lashes normal, conjunctivae and sclerae normal, no   icterus   Throat:   No oral lesions, no thrush, oral mucosa moist   Neck:   No adenopathy, supple, trachea midline, no thyromegaly, no   carotid bruit, no JVD   Lungs:     Clear to auscultation,respirations regular, even and                   unlabored    Heart:    Regular rhythm and normal rate, normal S1 and S2, no            murmur, no gallop, no rub, no click   Chest Wall:    No abnormalities observed   Abdomen:     Normal bowel sounds, no masses, no  organomegaly, soft        non-tender, non-distended, no guarding, no rebound                 tenderness   Rectal:     Deferred   Extremities:   no edema, no cyanosis, no redness   Skin:   No bleeding, bruising or rash   Lymph nodes:   No palpable adenopathy   Psychiatric:  Judgement and insight: normal   Orientation to person place and time: normal   Mood and affect: normal   Results Review:   I reviewed the patient's new clinical results.      Results from last 7 days  Lab Units 08/09/17  1206   WBC 10*3/mm3 7.76   HEMOGLOBIN g/dL 11.5*   HEMATOCRIT % 36.8   PLATELETS 10*3/mm3 254       Results from last 7 days  Lab Units 08/09/17  1206   SODIUM mmol/L 138   POTASSIUM mmol/L 4.0   CHLORIDE mmol/L 94*   CO2 mmol/L 30.7*   BUN mg/dL 14   CREATININE mg/dL 1.16*   CALCIUM mg/dL 10.9*   BILIRUBIN mg/dL 0.6   ALK PHOS U/L 93   ALT (SGPT) U/L 27   AST (SGOT) U/L 30   GLUCOSE mg/dL 96       Results from last 7 days  Lab Units 08/09/17  1207 08/04/17  0948   INR  1.58* 1.08     No results found for: LIPASE    Radiology:  XR Chest PA & Lateral   Final Result      CT Abdomen Pelvis Without Contrast   Final Result   1. No evidence for acute intra-abdominal process.   2. Basilar pulmonary emphysema.   3. Hiatal hernia. Atherosclerotic disease. Previous cholecystectomy and   hysterectomy.       This report was finalized on 8/9/2017 3:26 PM by Dr. Magdi Greer MD.              Assessment/Plan   Patient Active Problem List   Diagnosis   • Mitral valve replaced   • Atrial fibrillation [I48.91]   • Valvular heart disease   • Tachycardia   • Renal failure   • Palpitations   • Hypertension   • Hyperlipidemia   • COPD (chronic obstructive pulmonary disease)   • Mitral regurgitation   • Pain medication agreement   • Hiatal hernia   • Primary osteoarthritis involving multiple joints   • Heart failure   • Pulmonary hypertension   • S/P TVR (tricuspid valve repair)   • Long term current use of anticoagulant   • Pulmonary nodule   •  Aspiration pneumonia   • Hemoptysis   • RLS (restless legs syndrome)   • Chronic low back pain   • Dysplastic polyp of colon   • Lower GI bleed     Impression  #1 Lower GI bleeding: Onset in the postprocedural setting consistent with post-polypectomy bleeding  #2 anticoagulant therapy: Currently on Lovenox and Coumadin  #3 history of colon polyps  #4 valvular heart disease  #5 pulmonary hypertension    Recommendation  Anticipate colonoscopic evaluation and potential treatment of polypectomy site to address bleeding issues  Await Cardiac input  Monitor hemoglobin and hematocrit  I discussed the patients findings and my recommendations with patient, family and nursing staff.

## 2017-08-09 NOTE — ED TRIAGE NOTES
PT STATES HAD COLONSCOPY ON Friday, STATES FOR TWO DAYS HAVING RECTAL BLEEDING AND PASSING LARGE CLOTS. ON WARFARIN

## 2017-08-09 NOTE — TELEPHONE ENCOUNTER
"Call from Rachel ANDREW, (see [employer] auth of 5/4/16).  States for past 2 nights, pt has wakened \"soaked in blood\".  Had to change sheets.  States also passing clots.  Reports sensation of \"rectal spasms\" and rectal soreness.  Denies fever.  States during the day, notes \"a little blood\" on tissue.  Held Coumadin for 7 days prior to scopes as instructed, and then resumed on 8/5 as instructed.  Also on Lovenox.  INR today is 1.5.  Rachel also has call in to Cardiology.      Confer with DR Devine - pt to go to ER for eval.  Call to Rachel to advise of same.  Rachel verb understanding.  "

## 2017-08-09 NOTE — ED PROVIDER NOTES
" EMERGENCY DEPARTMENT ENCOUNTER    CHIEF COMPLAINT  Chief Complaint: Rectal Bleeding  History given by: Patient, Daughter  History limited by: N/A  Room Number: 43/43  PMD: Javan Martinez MD      HPI:  Pt reports that she is on Coumadin due to h/o valve replacement. Pt reports that she is s/p colonoscopy performed on 08/04/17 that showed noncancerous colonic polyps, which were removed. Pt reports that during the surgery, pt's Coumadin was temporarily stopped. However, pt was started on Lovenox. After the surgery, pt was restarted on Coumadin and pt's Lovenox regimen has continued. Pt presents to the ED c/o rectal bleeding with \"bright red blood\" per rectum onset about 2 days ago. Pt has also had lower abdominal pain, but denies vomiting, diarrhea, dyspnea, CP, and dizziness. There are no other complaints at this time.     Dr. Gan - cardiologist  Dr. Devine - GI    Location: Per rectum  Radiation: None  Quality: \"bright red blood\"  Intensity/Severity: Moderate  Duration: Onset about 2 days ago  Onset quality: Abrupt  Timing: Intermittent  Progression: Waxing and waning  Aggravating Factors: Taking blood thinners  Alleviating Factors: Nothing  Previous Episodes: None  Treatment before arrival: None  Associated Symptoms: Abdominal pain       PAST MEDICAL HISTORY  Active Ambulatory Problems     Diagnosis Date Noted   • Mitral valve replaced 01/25/2016   • Atrial fibrillation [I48.91] 01/25/2016   • Valvular heart disease    • Tachycardia    • Renal failure    • Palpitations    • Hypertension    • Hyperlipidemia    • COPD (chronic obstructive pulmonary disease)    • Mitral regurgitation    • Pain medication agreement 05/04/2016   • Hiatal hernia 05/04/2016   • Primary osteoarthritis involving multiple joints 05/04/2016   • Heart failure 06/08/2016   • Pulmonary hypertension    • S/P TVR (tricuspid valve repair) 07/07/2016   • Long term current use of anticoagulant 10/13/2016   • Pulmonary nodule 10/13/2016   • " Aspiration pneumonia 10/14/2016   • Hemoptysis 10/14/2016   • RLS (restless legs syndrome) 11/18/2016   • Chronic low back pain 08/02/2017   • Dysplastic polyp of colon 08/07/2017     Resolved Ambulatory Problems     Diagnosis Date Noted   • No Resolved Ambulatory Problems     Past Medical History:   Diagnosis Date   • Atrial fibrillation    • Atrial flutter    • CHF (congestive heart failure)    • Colon polyp    • COPD (chronic obstructive pulmonary disease)    • Hyperlipidemia    • Hypertension    • Mitral regurgitation    • Palpitations    • Pulmonary hypertension    • Renal failure    • Tachycardia    • Valvular heart disease          PAST SURGICAL HISTORY  Past Surgical History:   Procedure Laterality Date   • CARDIAC CATHETERIZATION  09/01/2014    Right dominant systemt, normal coronary arteries.    • CARDIAC CATHETERIZATION Left 6/10/2016    Procedure: Cardiac catheterization;  Surgeon: Sergei Hall MD;  Location: [facility] CATH INVASIVE LOCATION;  Service:    • CARDIAC CATHETERIZATION N/A 6/10/2016    Procedure: Right Heart Cath;  Surgeon: Sergei Hall MD;  Location: [facility] CATH INVASIVE LOCATION;  Service:    • COLONOSCOPY     • COLONOSCOPY N/A 8/4/2017    Procedure: COLONOSCOPY TO CECUM/TI WITH POLYPECTOMY ( COLD BX);  Surgeon: Cleveland Devine MD;  Location: [facility] ENDOSCOPY;  Service:    • KIDNEY SURGERY  04/22/2013   • MAZE PROCEDURE     • MITRAL VALVE REPLACEMENT     • TRICUSPID VALVE REPLACEMENT           FAMILY HISTORY  Family History   Problem Relation Age of Onset   • Adopted: Yes   • No Known Problems Mother    • No Known Problems Father          SOCIAL HISTORY  Social History     Social History   • Marital status:      Spouse name: N/A   • Number of children: N/A   • Years of education: N/A     Occupational History   • Not on file.     Social History Main Topics   • Smoking status: Former Smoker   • Smokeless tobacco: Not on file   • Alcohol use No      Comment: caffine use   • Drug use: No   •  Sexual activity: Defer     Other Topics Concern   • Not on file     Social History Narrative         ALLERGIES  Bupropion; Cephalexin; and Metoprolol        REVIEW OF SYSTEMS  Review of Systems   Constitutional: Negative for chills.   HENT: Negative for congestion, rhinorrhea and sore throat.    Eyes: Negative for pain.   Respiratory: Negative for cough and shortness of breath.    Cardiovascular: Negative for chest pain, palpitations and leg swelling.   Gastrointestinal: Positive for abdominal pain. Negative for diarrhea, nausea and vomiting.        Rectal bleeding    Genitourinary: Negative for difficulty urinating, dysuria, flank pain and frequency.   Musculoskeletal: Negative for myalgias, neck pain and neck stiffness.   Skin: Negative for rash.   Neurological: Negative for dizziness, speech difficulty, weakness, light-headedness, numbness and headaches.   Psychiatric/Behavioral: Negative.    All other systems reviewed and are negative.           PHYSICAL EXAM  ED Triage Vitals   Temp Heart Rate Resp BP SpO2   08/09/17 1150 08/09/17 1150 08/09/17 1150 08/09/17 1210 08/09/17 1150   99.3 °F (37.4 °C) 97 22 151/110 94 %      Temp src Heart Rate Source Patient Position BP Location FiO2 (%)   08/09/17 1150 08/09/17 1150 -- -- --   Tympanic Monitor          Physical Exam   Constitutional: She is oriented to person, place, and time. No distress.   HENT:   Head: Normocephalic.   Mouth/Throat: Mucous membranes are normal.   Eyes: EOM are normal. Pupils are equal, round, and reactive to light.   Neck: Normal range of motion. Neck supple.   Cardiovascular: Normal rate, regular rhythm and normal heart sounds.    Pulmonary/Chest: Effort normal and breath sounds normal. No respiratory distress. She has no decreased breath sounds. She has no wheezes. She has no rhonchi. She has no rales.   Abdominal: Soft. There is tenderness in the suprapubic area. There is no rebound and no guarding.   Genitourinary:   Genitourinary Comments:  bright red blood clots present per rectum (pt provides specimen from her pad)   Musculoskeletal: Normal range of motion.   Neurological: She is alert and oriented to person, place, and time. She has normal sensation.   Skin: Skin is warm and dry.   Psychiatric: Mood and affect normal.   Nursing note and vitals reviewed.          LAB RESULTS  Recent Results (from the past 24 hour(s))   Comprehensive Metabolic Panel    Collection Time: 08/09/17 12:06 PM   Result Value Ref Range    Glucose 96 65 - 99 mg/dL    BUN 14 8 - 23 mg/dL    Creatinine 1.16 (H) 0.57 - 1.00 mg/dL    Sodium 138 136 - 145 mmol/L    Potassium 4.0 3.5 - 5.2 mmol/L    Chloride 94 (L) 98 - 107 mmol/L    CO2 30.7 (H) 22.0 - 29.0 mmol/L    Calcium 10.9 (H) 8.6 - 10.5 mg/dL    Total Protein 8.3 6.0 - 8.5 g/dL    Albumin 4.50 3.50 - 5.20 g/dL    ALT (SGPT) 27 1 - 33 U/L    AST (SGOT) 30 1 - 32 U/L    Alkaline Phosphatase 93 39 - 117 U/L    Total Bilirubin 0.6 0.1 - 1.2 mg/dL    eGFR Non African Amer 47 (L) >60 mL/min/1.73    Globulin 3.8 gm/dL    A/G Ratio 1.2 g/dL    BUN/Creatinine Ratio 12.1 7.0 - 25.0    Anion Gap 13.3 mmol/L   Green Top (Gel)    Collection Time: 08/09/17 12:06 PM   Result Value Ref Range    Extra Tube Hold for add-ons.    Lavender Top    Collection Time: 08/09/17 12:06 PM   Result Value Ref Range    Extra Tube hold for add-on    CBC Auto Differential    Collection Time: 08/09/17 12:06 PM   Result Value Ref Range    WBC 7.76 4.50 - 10.70 10*3/mm3    RBC 4.13 3.90 - 5.20 10*6/mm3    Hemoglobin 11.5 (L) 11.9 - 15.5 g/dL    Hematocrit 36.8 35.6 - 45.5 %    MCV 89.1 80.5 - 98.2 fL    MCH 27.8 26.9 - 32.0 pg    MCHC 31.3 (L) 32.4 - 36.3 g/dL    RDW 14.1 (H) 11.7 - 13.0 %    RDW-SD 45.3 37.0 - 54.0 fl    MPV 11.2 6.0 - 12.0 fL    Platelets 254 140 - 500 10*3/mm3    Neutrophil % 75.1 42.7 - 76.0 %    Lymphocyte % 16.6 (L) 19.6 - 45.3 %    Monocyte % 4.9 (L) 5.0 - 12.0 %    Eosinophil % 2.6 0.3 - 6.2 %    Basophil % 0.4 0.0 - 1.5 %    Immature  Grans % 0.4 0.0 - 0.5 %    Neutrophils, Absolute 5.83 1.90 - 8.10 10*3/mm3    Lymphocytes, Absolute 1.29 0.90 - 4.80 10*3/mm3    Monocytes, Absolute 0.38 0.20 - 1.20 10*3/mm3    Eosinophils, Absolute 0.20 0.00 - 0.70 10*3/mm3    Basophils, Absolute 0.03 0.00 - 0.20 10*3/mm3    Immature Grans, Absolute 0.03 0.00 - 0.03 10*3/mm3   Type & Screen    Collection Time: 08/09/17 12:07 PM   Result Value Ref Range    ABO Type A     RH type Positive     Antibody Screen Negative    Protime-INR    Collection Time: 08/09/17 12:07 PM   Result Value Ref Range    Protime 18.3 (H) 11.7 - 14.2 Seconds    INR 1.58 (H) 0.90 - 1.10   Light Blue Top    Collection Time: 08/09/17 12:07 PM   Result Value Ref Range    Extra Tube hold for add-on    Gold Top - SST    Collection Time: 08/09/17 12:07 PM   Result Value Ref Range    Extra Tube Hold for add-ons.    Lactic Acid, Plasma    Collection Time: 08/09/17 12:29 PM   Result Value Ref Range    Lactate 1.2 0.5 - 2.0 mmol/L       Ordered the above labs and reviewed the results.        RADIOLOGY  CT Abdomen Pelvis Without Contrast   Preliminary Result   1. No evidence for acute intra-abdominal process.   2. Basilar pulmonary emphysema.   3. Hiatal hernia. Atherosclerotic disease. Previous cholecystectomy and   hysterectomy.    Interpreted by radiologist. Discussed with radiologist. Independently viewed by me.                Ordered the above noted radiological studies. Reviewed by me in PACS.       PROCEDURES  Procedures          PROGRESS AND CONSULTS  ED Course   Comment By Time   2:43 PM  Patient with rectal bleeding.  Has history of heart valve and on anticoagulation.  Had recent colonoscopy with polyp removal.  Bleeding seems to be getting worse.  CT shows no perforation.  Discussed with Dr. Devine who will consult.  Discussed with Dr. Newby who will admit. Amrit Reinoso MD 08/09 0634     12:22 PM:  Lactic acid ordered for further evaluation.     12:52 PM:  CT Abd ordered for further  evaluation.     1:56 PM:  Pt's Hgb is 11.5. Placed call to ARACELI Chairez, to discuss further course of care.     2:15 PM:   Discussed case with RN for ARACELI Chairez. Their group will consult. They would like pt admitted to the hospitalist. Decision time to admit: Now.     1:59 PM:  Placed call to A for admission.     2:03 PM:  Rechecked pt. Informed pt that her Hgb is 11.5. I have discussed the case with the RN for GI. Need for admission for further evaluation and treatment. Pt agrees with plan.     2:40 PM:  Discussed case with Dr. Newby hospitaltrena. She will admit pt to a telemetry bed.                 MEDICAL DECISION MAKING      MDM  Number of Diagnoses or Management Options     Amount and/or Complexity of Data Reviewed  Clinical lab tests: ordered and reviewed (Hgb is 11.5. )  Tests in the radiology section of CPT®: ordered and reviewed (CT Abd:   1. No evidence for acute intra-abdominal process.  2. Basilar pulmonary emphysema.  3. Hiatal hernia. Atherosclerotic disease. Previous cholecystectomy and  hysterectomy.)  Discussion of test results with the performing providers: yes (CT Abd results d/w radiologist.   )  Decide to obtain previous medical records or to obtain history from someone other than the patient: yes  Review and summarize past medical records: yes (Pt underwent colonoscopy on 08/04/17 that showed noncancerous colonic polyps, which were removed.)  Discuss the patient with other providers: yes (Discussed case with RN for ARACELI Chairez. Their group will consult. Case d/w Dr. Newby, hospitalist, who will admit pt to a telemetry bed.   )    Patient Progress  Patient progress: stable             DIAGNOSIS  Final diagnoses:   Lower GI bleed         DISPOSITION  Pt admitted to telemetry.        ADMISSION    Discussed treatment plan and reason for admission with pt/family and admitting physician.  Pt/family voiced understanding of the plan for admission for further testing/treatment as needed.            Latest Documented Vital Signs:  As of 2:50 PM  BP- 159/89 HR- 73 Temp- 99.3 °F (37.4 °C) (Tympanic) O2 sat- 99%        --  Documentation assistance provided by thanh Ruby for Dr. Kemar MD.  Information recorded by the scribe was done at my direction and has been verified and validated by me.              Dana Ruby  08/09/17 [id number]       Amrit Reinoso MD  08/09/17 [id number]

## 2017-08-09 NOTE — TELEPHONE ENCOUNTER
Call received from DR Colby Reinoso in ER.  States pt's rectal bleeding has increased.  Has passed fairly lg clots in ER.  Hgb is 11.  CT completed - no perforation.  Because pt has valve, cannot stop anticoag.  Pt will be admitted.      Update to DR Devine.

## 2017-08-10 ENCOUNTER — ANESTHESIA (OUTPATIENT)
Dept: GASTROENTEROLOGY | Facility: HOSPITAL | Age: 62
End: 2017-08-10

## 2017-08-10 ENCOUNTER — ANESTHESIA EVENT (OUTPATIENT)
Dept: GASTROENTEROLOGY | Facility: HOSPITAL | Age: 62
End: 2017-08-10

## 2017-08-10 ENCOUNTER — TELEPHONE (OUTPATIENT)
Dept: CARDIOLOGY | Facility: CLINIC | Age: 62
End: 2017-08-10

## 2017-08-10 LAB
ANION GAP SERPL CALCULATED.3IONS-SCNC: 14.8 MMOL/L
BASOPHILS # BLD AUTO: 0.03 10*3/MM3 (ref 0–0.2)
BASOPHILS NFR BLD AUTO: 0.4 % (ref 0–1.5)
BUN BLD-MCNC: 12 MG/DL (ref 8–23)
BUN/CREAT SERPL: 11 (ref 7–25)
CALCIUM SPEC-SCNC: 9.2 MG/DL (ref 8.6–10.5)
CHLORIDE SERPL-SCNC: 98 MMOL/L (ref 98–107)
CHOLEST SERPL-MCNC: 163 MG/DL (ref 0–200)
CO2 SERPL-SCNC: 27.2 MMOL/L (ref 22–29)
CREAT BLD-MCNC: 1.09 MG/DL (ref 0.57–1)
DEPRECATED RDW RBC AUTO: 46.5 FL (ref 37–54)
EOSINOPHIL # BLD AUTO: 0.17 10*3/MM3 (ref 0–0.7)
EOSINOPHIL NFR BLD AUTO: 2 % (ref 0.3–6.2)
ERYTHROCYTE [DISTWIDTH] IN BLOOD BY AUTOMATED COUNT: 14.2 % (ref 11.7–13)
GFR SERPL CREATININE-BSD FRML MDRD: 51 ML/MIN/1.73
GLUCOSE BLD-MCNC: 118 MG/DL (ref 65–99)
HCT VFR BLD AUTO: 33.2 % (ref 35.6–45.5)
HCT VFR BLD AUTO: 34.2 % (ref 35.6–45.5)
HDLC SERPL-MCNC: 58 MG/DL (ref 40–60)
HGB BLD-MCNC: 10.3 G/DL (ref 11.9–15.5)
HGB BLD-MCNC: 10.6 G/DL (ref 11.9–15.5)
IMM GRANULOCYTES # BLD: 0.02 10*3/MM3 (ref 0–0.03)
IMM GRANULOCYTES NFR BLD: 0.2 % (ref 0–0.5)
INR PPP: 1.5 (ref 0.9–1.1)
LDLC SERPL CALC-MCNC: 79 MG/DL (ref 0–100)
LDLC/HDLC SERPL: 1.37 {RATIO}
LYMPHOCYTES # BLD AUTO: 1.38 10*3/MM3 (ref 0.9–4.8)
LYMPHOCYTES NFR BLD AUTO: 16.5 % (ref 19.6–45.3)
MAGNESIUM SERPL-MCNC: 2 MG/DL (ref 1.6–2.4)
MCH RBC QN AUTO: 27.7 PG (ref 26.9–32)
MCHC RBC AUTO-ENTMCNC: 31 G/DL (ref 32.4–36.3)
MCV RBC AUTO: 89.5 FL (ref 80.5–98.2)
MONOCYTES # BLD AUTO: 0.51 10*3/MM3 (ref 0.2–1.2)
MONOCYTES NFR BLD AUTO: 6.1 % (ref 5–12)
NEUTROPHILS # BLD AUTO: 6.23 10*3/MM3 (ref 1.9–8.1)
NEUTROPHILS NFR BLD AUTO: 74.8 % (ref 42.7–76)
PHOSPHATE SERPL-MCNC: 4 MG/DL (ref 2.5–4.5)
PLATELET # BLD AUTO: 230 10*3/MM3 (ref 140–500)
PMV BLD AUTO: 10.8 FL (ref 6–12)
POTASSIUM BLD-SCNC: 3.9 MMOL/L (ref 3.5–5.2)
PROTHROMBIN TIME: 17.5 SECONDS (ref 11.7–14.2)
RBC # BLD AUTO: 3.82 10*6/MM3 (ref 3.9–5.2)
SODIUM BLD-SCNC: 140 MMOL/L (ref 136–145)
TRIGL SERPL-MCNC: 129 MG/DL (ref 0–150)
TROPONIN T SERPL-MCNC: <0.01 NG/ML (ref 0–0.03)
TSH SERPL DL<=0.05 MIU/L-ACNC: 3.78 MIU/ML (ref 0.27–4.2)
VLDLC SERPL-MCNC: 25.8 MG/DL (ref 5–40)
WBC NRBC COR # BLD: 8.34 10*3/MM3 (ref 4.5–10.7)

## 2017-08-10 PROCEDURE — 84484 ASSAY OF TROPONIN QUANT: CPT | Performed by: INTERNAL MEDICINE

## 2017-08-10 PROCEDURE — 80061 LIPID PANEL: CPT | Performed by: INTERNAL MEDICINE

## 2017-08-10 PROCEDURE — 80048 BASIC METABOLIC PNL TOTAL CA: CPT | Performed by: INTERNAL MEDICINE

## 2017-08-10 PROCEDURE — 0DJD8ZZ INSPECTION OF LOWER INTESTINAL TRACT, VIA NATURAL OR ARTIFICIAL OPENING ENDOSCOPIC: ICD-10-PCS | Performed by: INTERNAL MEDICINE

## 2017-08-10 PROCEDURE — 94799 UNLISTED PULMONARY SVC/PX: CPT

## 2017-08-10 PROCEDURE — 45378 DIAGNOSTIC COLONOSCOPY: CPT | Performed by: INTERNAL MEDICINE

## 2017-08-10 PROCEDURE — 83735 ASSAY OF MAGNESIUM: CPT | Performed by: INTERNAL MEDICINE

## 2017-08-10 PROCEDURE — 85018 HEMOGLOBIN: CPT | Performed by: INTERNAL MEDICINE

## 2017-08-10 PROCEDURE — 85014 HEMATOCRIT: CPT | Performed by: INTERNAL MEDICINE

## 2017-08-10 PROCEDURE — 84443 ASSAY THYROID STIM HORMONE: CPT | Performed by: INTERNAL MEDICINE

## 2017-08-10 PROCEDURE — 25010000002 MORPHINE SULFATE (PF) 2 MG/ML SOLUTION: Performed by: INTERNAL MEDICINE

## 2017-08-10 PROCEDURE — 84100 ASSAY OF PHOSPHORUS: CPT | Performed by: INTERNAL MEDICINE

## 2017-08-10 PROCEDURE — 94640 AIRWAY INHALATION TREATMENT: CPT

## 2017-08-10 PROCEDURE — 99232 SBSQ HOSP IP/OBS MODERATE 35: CPT | Performed by: INTERNAL MEDICINE

## 2017-08-10 PROCEDURE — 85610 PROTHROMBIN TIME: CPT | Performed by: INTERNAL MEDICINE

## 2017-08-10 PROCEDURE — 85025 COMPLETE CBC W/AUTO DIFF WBC: CPT | Performed by: INTERNAL MEDICINE

## 2017-08-10 PROCEDURE — 25010000002 PROPOFOL 10 MG/ML EMULSION: Performed by: ANESTHESIOLOGY

## 2017-08-10 DEVICE — DEV CLIP ENDO RESOLUTION360 CONTRL ROT 235CM: Type: IMPLANTABLE DEVICE | Site: TRANSVERSE COLON | Status: FUNCTIONAL

## 2017-08-10 RX ORDER — CARVEDILOL 12.5 MG/1
12.5 TABLET ORAL 2 TIMES DAILY WITH MEALS
Status: DISCONTINUED | OUTPATIENT
Start: 2017-08-10 | End: 2017-08-25 | Stop reason: HOSPADM

## 2017-08-10 RX ORDER — PROPOFOL 10 MG/ML
VIAL (ML) INTRAVENOUS CONTINUOUS PRN
Status: DISCONTINUED | OUTPATIENT
Start: 2017-08-10 | End: 2017-08-10 | Stop reason: SURG

## 2017-08-10 RX ORDER — HYDROCORTISONE ACETATE 25 MG/1
25 SUPPOSITORY RECTAL 2 TIMES DAILY
Status: DISCONTINUED | OUTPATIENT
Start: 2017-08-10 | End: 2017-08-25 | Stop reason: HOSPADM

## 2017-08-10 RX ORDER — SODIUM CHLORIDE 9 MG/ML
30 INJECTION, SOLUTION INTRAVENOUS CONTINUOUS
Status: DISCONTINUED | OUTPATIENT
Start: 2017-08-10 | End: 2017-08-10

## 2017-08-10 RX ORDER — LISINOPRIL 10 MG/1
10 TABLET ORAL DAILY
Status: DISCONTINUED | OUTPATIENT
Start: 2017-08-10 | End: 2017-08-15

## 2017-08-10 RX ORDER — SODIUM CHLORIDE 0.9 % (FLUSH) 0.9 %
1-10 SYRINGE (ML) INJECTION AS NEEDED
Status: DISCONTINUED | OUTPATIENT
Start: 2017-08-10 | End: 2017-08-10

## 2017-08-10 RX ORDER — LIDOCAINE HYDROCHLORIDE 20 MG/ML
INJECTION, SOLUTION INFILTRATION; PERINEURAL AS NEEDED
Status: DISCONTINUED | OUTPATIENT
Start: 2017-08-10 | End: 2017-08-10 | Stop reason: SURG

## 2017-08-10 RX ADMIN — TEMAZEPAM 30 MG: 15 CAPSULE ORAL at 22:24

## 2017-08-10 RX ADMIN — MORPHINE SULFATE 2 MG: 2 INJECTION, SOLUTION INTRAMUSCULAR; INTRAVENOUS at 11:44

## 2017-08-10 RX ADMIN — ALFENTANIL HYDROCHLORIDE 500 MCG: 500 INJECTION, SOLUTION INTRAVENOUS at 09:45

## 2017-08-10 RX ADMIN — BUDESONIDE AND FORMOTEROL FUMARATE DIHYDRATE 2 PUFF: 160; 4.5 AEROSOL RESPIRATORY (INHALATION) at 06:55

## 2017-08-10 RX ADMIN — ROPINIROLE 2 MG: 2 TABLET, FILM COATED ORAL at 20:07

## 2017-08-10 RX ADMIN — HYDROCODONE BITARTRATE AND ACETAMINOPHEN 1 TABLET: 5; 325 TABLET ORAL at 01:28

## 2017-08-10 RX ADMIN — TIOTROPIUM BROMIDE 1 CAPSULE: 18 CAPSULE ORAL; RESPIRATORY (INHALATION) at 06:55

## 2017-08-10 RX ADMIN — NITROGLYCERIN 1 INCH: 20 OINTMENT TOPICAL at 19:00

## 2017-08-10 RX ADMIN — BUDESONIDE AND FORMOTEROL FUMARATE DIHYDRATE 2 PUFF: 160; 4.5 AEROSOL RESPIRATORY (INHALATION) at 20:16

## 2017-08-10 RX ADMIN — MORPHINE SULFATE 2 MG: 2 INJECTION, SOLUTION INTRAMUSCULAR; INTRAVENOUS at 23:27

## 2017-08-10 RX ADMIN — POLYETHYLENE GLYCOL 3350 119 G: 17 POWDER, FOR SOLUTION ORAL at 06:01

## 2017-08-10 RX ADMIN — CARVEDILOL 12.5 MG: 12.5 TABLET, FILM COATED ORAL at 19:00

## 2017-08-10 RX ADMIN — SODIUM CHLORIDE 30 ML/HR: 9 INJECTION, SOLUTION INTRAVENOUS at 09:06

## 2017-08-10 RX ADMIN — NITROGLYCERIN 1 INCH: 20 OINTMENT TOPICAL at 06:15

## 2017-08-10 RX ADMIN — NITROGLYCERIN 1 INCH: 20 OINTMENT TOPICAL at 13:00

## 2017-08-10 RX ADMIN — PROPOFOL 180 MCG/KG/MIN: 10 INJECTION, EMULSION INTRAVENOUS at 09:45

## 2017-08-10 RX ADMIN — LIDOCAINE HYDROCHLORIDE 50 MG: 20 INJECTION, SOLUTION INFILTRATION; PERINEURAL at 09:45

## 2017-08-10 RX ADMIN — MORPHINE SULFATE 2 MG: 2 INJECTION, SOLUTION INTRAMUSCULAR; INTRAVENOUS at 19:28

## 2017-08-10 RX ADMIN — CYCLOBENZAPRINE HYDROCHLORIDE 10 MG: 10 TABLET, FILM COATED ORAL at 20:07

## 2017-08-10 RX ADMIN — MORPHINE SULFATE 2 MG: 2 INJECTION, SOLUTION INTRAMUSCULAR; INTRAVENOUS at 04:13

## 2017-08-10 NOTE — TELEPHONE ENCOUNTER
Pts caregiver, Rachel called stating the pt was in the hospital for colonoscopy today.  Rachel states she spoke w Fabiana this morning  and she informed her the pt was to d'c her anticoag x 1week.  Rachel called for clarification of this.  Upon reviewing chart and admit orders from 8/9/17, Rachel was informed this was being taking care of by inpt providers at the hospital.  She was told her best to answer the questions would be the staff at the hospital.  Rachel voiced understanding.      [facility]

## 2017-08-10 NOTE — PLAN OF CARE
Problem: Pain, Acute (Adult)  Goal: Identify Related Risk Factors and Signs and Symptoms  Outcome: Ongoing (interventions implemented as appropriate)  Goal: Acceptable Pain Control/Comfort Level  Outcome: Ongoing (interventions implemented as appropriate)    Problem: Gastrointestinal Bleeding (Adult)  Goal: Signs and Symptoms of Listed Potential Problems Will be Absent or Manageable (Gastrointestinal Bleeding)  Outcome: Ongoing (interventions implemented as appropriate)

## 2017-08-10 NOTE — PROGRESS NOTES
LOS: 1 day   Patient Care Team:  Javan Martinez MD as PCP - General  Javan Martinez MD as PCP - Family Medicine    Chief Complaint:   f/u rectal bleeding    Interval History:   Mild cp, better with ntg, no dizziness, no dyspnea.  No abd pain, no nausea, still passing blood.     Objective   Vital Signs  Temp:  [97.7 °F (36.5 °C)-99.3 °F (37.4 °C)] 97.7 °F (36.5 °C)  Heart Rate:  [72-97] 77  Resp:  [18-22] 18  BP: (144-175)/() 164/83    Intake/Output Summary (Last 24 hours) at 08/10/17 0755  Last data filed at 08/10/17 0747   Gross per 24 hour   Intake              360 ml   Output             1000 ml   Net             -640 ml       Comfortable NAD  Neck supple, no JVD or thyromegaly appreciated  S1/S2 RRR, no m/r/g  Lungs CTA B, normal effort  Abdomen S/NT/ND (+) BS, no HSM appreciated  Extremities warm, no clubbing, cyanosis, or edema  No visible or palpable skin lesions  A/Ox4, mood and affect appropriate    Results Review:        Results from last 7 days  Lab Units 08/10/17  0548 08/09/17  1206   SODIUM mmol/L 140 138   POTASSIUM mmol/L 3.9 4.0   CHLORIDE mmol/L 98 94*   CO2 mmol/L 27.2 30.7*   BUN mg/dL 12 14   CREATININE mg/dL 1.09* 1.16*   GLUCOSE mg/dL 118* 96   CALCIUM mg/dL 9.2 10.9*       Results from last 7 days  Lab Units 08/10/17  0548 08/09/17  1810 08/09/17  1206   TROPONIN T ng/mL <0.010 <0.010 <0.010       Results from last 7 days  Lab Units 08/10/17  0548 08/10/17  0010 08/09/17  1206   WBC 10*3/mm3 8.34  --  7.76   HEMOGLOBIN g/dL 10.6* 10.3* 11.5*   HEMATOCRIT % 34.2* 33.2* 36.8   PLATELETS 10*3/mm3 230  --  254       Results from last 7 days  Lab Units 08/10/17  0548 08/09/17  1207 08/04/17  0948   INR  1.50* 1.58* 1.08       Results from last 7 days  Lab Units 08/10/17  0548   CHOLESTEROL mg/dL 163       Results from last 7 days  Lab Units 08/10/17  0548   MAGNESIUM mg/dL 2.0       Results from last 7 days  Lab Units 08/10/17  0548   CHOLESTEROL mg/dL 163   TRIGLYCERIDES mg/dL 129    HDL CHOL mg/dL 58       I reviewed the patient's new clinical results.  I personally viewed and interpreted the patient's EKG/Telemetry data        Medication Review:     atorvastatin 40 mg Oral Daily   budesonide-formoterol 2 puff Inhalation BID - RT   carvedilol 6.25 mg Oral BID With Meals   cetirizine 10 mg Oral Daily   cyclobenzaprine 10 mg Oral Nightly   lisinopril 5 mg Oral Daily   multivitamin with minerals 1 tablet Oral Daily   nitroglycerin 1 inch Topical Q6H   pantoprazole 40 mg Oral QAM   rOPINIRole 2 mg Oral Nightly   sertraline 100 mg Oral Daily   tiotropium 1 capsule Inhalation Daily - RT            Assessment/Plan     Active Problems:    Mitral valve replaced    Atrial fibrillation [I48.91]    Valvular heart disease    Hypertension    COPD (chronic obstructive pulmonary disease)    Pulmonary hypertension    Long term current use of anticoagulant    Lower GI bleed    1. Rectal bleeding. Warfarin and lovenox held, GI considering colonoscopy - she could have this done as currently very stable.    2. Tissue MVR and TV repair.   3. paf - current NSR  4. Severe pulmonary HTN.   5. Hypertension. BP high this am.   6. Chest pain - ECG unchanged and troponin negative, treat with ntg and feels better with ntg paste    Will follow, ok for colonoscopy. Increase bp meds.      Kirti Arteaga MD  08/10/17  7:55 AM

## 2017-08-10 NOTE — PROGRESS NOTES
"    DAILY PROGRESS NOTE  [facility]    Patient Identification:  Name: Paz Browne  Age: 62 y.o.  Sex: female  :  1955  MRN: [id number]         Primary Care Physician: Javan Martinez MD    Subjective:  Interval History: feeling better - no bleeding though still w/ abd cramps. Denies cp/n/v    Objective:no fm    Scheduled Meds:  atorvastatin 40 mg Oral Daily   budesonide-formoterol 2 puff Inhalation BID - RT   carvedilol 12.5 mg Oral BID With Meals   cetirizine 10 mg Oral Daily   cyclobenzaprine 10 mg Oral Nightly   hydrocortisone 25 mg Rectal BID   lisinopril 10 mg Oral Daily   multivitamin with minerals 1 tablet Oral Daily   nitroglycerin 1 inch Topical Q6H   pantoprazole 40 mg Oral QAM   rOPINIRole 2 mg Oral Nightly   sertraline 100 mg Oral Daily   tiotropium 1 capsule Inhalation Daily - RT     Continuous Infusions:  sodium chloride 30 mL/hr Last Rate: Stopped (08/10/17 1052)       Vital signs in last 24 hours:  Temp:  [97.7 °F (36.5 °C)-98.9 °F (37.2 °C)] 97.9 °F (36.6 °C)  Heart Rate:  [72-83] 77  Resp:  [12-20] 16  BP: ()/(58-92) 147/85    Intake/Output:    Intake/Output Summary (Last 24 hours) at 08/10/17 1441  Last data filed at 08/10/17 1405   Gross per 24 hour   Intake             1250 ml   Output             1600 ml   Net             -350 ml       Exam:  /85 (BP Location: Left arm, Patient Position: Lying)  Pulse 77  Temp 97.9 °F (36.6 °C) (Oral)   Resp 16  Ht 67\" (170.2 cm)  Wt 166 lb (75.3 kg)  SpO2 100%  BMI 26 kg/m2    General Appearance:    Alert, cooperative, no distress, AAOx3                         Throat:   Lips, tongue, gums normal; oral mucosa pink and moist                           Neck:   Supple, no JVD                         Lungs:    Clear to auscultation bilaterally, respirations unlabored                 Chest Wall:    No tenderness or deformity                          Heart:    Regular rate and rhythm, S1 and S2 normal                  " Abdomen:     Soft, non-tender though some discomfort w/ deep palpation, bowel sounds active, no masses                 Extremities:   Extremities normal, atraumatic, no cyanosis or edema                             Data Review:  Labs in chart were reviewed.    Assessment:  Active Hospital Problems (** Indicates Principal Problem)    Diagnosis Date Noted   • Lower GI bleed [K92.2] 08/09/2017   • Long term current use of anticoagulant [Z79.01] 10/13/2016   • Pulmonary hypertension [I27.2]    • Hypertension [I10]    • Valvular heart disease [I38]    • COPD (chronic obstructive pulmonary disease) [J44.9]    • Mitral valve replaced [Z95.2] 01/25/2016   • Atrial fibrillation [I48.91] [I48.91] 01/25/2016      Resolved Hospital Problems    Diagnosis Date Noted Date Resolved   No resolved problems to display.       Plan:  Cscope w/ mucosa abnormality that was clipped w/ bleeding internal hemorrhoids   -resume Lovenox/Coumadin when OK w/ GI - Cards o/w managing AC and is following along in consultation    -Hgb stable and improving - dc [id number] and monitor daily     PAF/MVR/TV w/ CP - ekg unchanged and improved troponins - on NTG paste    Severe Pulm HTN - stable     HTN - ok/labile    Appreciate input and assistance from ALL    Leonel Ruiz MD  8/10/2017  2:41 PM

## 2017-08-10 NOTE — H&P
PCP: Javan Martinez MD    Chief complaint   Chief Complaint   Patient presents with   • Rectal Bleeding   • Abdominal Pain       HPI  Patient is a 62 y.o. female presents with History of A. Fib, mechanical heart valve who presents with lower GI bleed and blood clots.  Patient had a colonoscopy 5 days ago where she had 11 polyps removed and she had a little bit of bleeding afterwards.  However yesterday she started passing big blood clots.  She felt like she was having rectal spasms.  She was having to use a dog pad.  Due to her mechanical valve she stopped her Coumadin July 30 and started Lovenox shots July 31 daily and then on August 5 she started  Coumadin back.     Patient also states that she has increased her weight significantly and has been doubled on her Lasix for about the past week.  She has dyspnea on exertion but no chest pain.  Denies any orthopnea or PND.  No lower extremity swelling.  She is chronically on 2 L oxygen and 5 with exertion.      PAST MEDICAL HISTORY  Past Medical History:   Diagnosis Date   • Atrial fibrillation     Fani procedure   • Atrial flutter     cardioversion   • CHF (congestive heart failure)    • Colon polyp    • COPD (chronic obstructive pulmonary disease)    • Hyperlipidemia    • Hypertension    • Mitral regurgitation    • Palpitations    • Pulmonary hypertension    • Renal failure    • Tachycardia    • Valvular heart disease        PAST SURGICAL HISTORY  Past Surgical History:   Procedure Laterality Date   • CARDIAC CATHETERIZATION  09/01/2014    Right dominant systemt, normal coronary arteries.    • CARDIAC CATHETERIZATION Left 6/10/2016    Procedure: Cardiac catheterization;  Surgeon: Sergei Hall MD;  Location:  [facility];  Service:    • CARDIAC CATHETERIZATION N/A 6/10/2016    Procedure: Right Heart Cath;  Surgeon: Sergei Hall MD;  Location:  [facility];  Service:    • COLONOSCOPY     • COLONOSCOPY N/A 8/4/2017    Procedure:  COLONOSCOPY TO CECUM/TI WITH POLYPECTOMY ( COLD BX);  Surgeon: Cleveland Devine MD;  Location: [facility] ENDOSCOPY;  Service:    • KIDNEY SURGERY  04/22/2013   • MAZE PROCEDURE     • MITRAL VALVE REPLACEMENT     • TRICUSPID VALVE REPLACEMENT         FAMILY HISTORY  Family History   Problem Relation Age of Onset   • Adopted: Yes   • No Known Problems Mother    • No Known Problems Father        SOCIAL HISTORY  Social History   Substance Use Topics   • Smoking status: Former Smoker   • Smokeless tobacco: None   • Alcohol use No      Comment: caffine use       MEDICATIONS:  Prescriptions Prior to Admission   Medication Sig Dispense Refill Last Dose   • albuterol (PROVENTIL) (2.5 MG/3ML) 0.083% nebulizer solution Take 2.5 mg by nebulization 2 (Two) Times a Day As Needed for Wheezing.      • atorvastatin (LIPITOR) 40 MG tablet Take 1 tablet by mouth Daily. 30 tablet 3 8/3/2017 at Unknown time   • carvedilol (COREG) 6.25 MG tablet Take 1 tablet by mouth 2 (two) times a day with meals. 60 tablet 5 8/4/2017 at Unknown time   • cyclobenzaprine (FLEXERIL) 10 MG tablet Take 10 mg by mouth Every Night.      • enoxaparin (LOVENOX) 40 MG/0.4ML solution syringe Inject 40 mg under the skin Every 12 (Twelve) Hours.   8/9/2017 at Unknown time   • fluticasone-salmeterol (ADVAIR DISKUS) 250-50 MCG/DOSE DISKUS Inhale 1 puff 2 (Two) Times a Day. 3 each 3 8/4/2017 at Unknown time   • furosemide (LASIX) 40 MG tablet Take 1 tablet by mouth 2 (Two) Times a Day. 60 tablet 5    • HYDROcodone-acetaminophen (NORCO) 5-325 MG per tablet Take 1 tablet by mouth Every 8 (Eight) Hours As Needed for Moderate Pain (4-6). Chronic pain medicine for low back pain 90 tablet 0 8/3/2017 at Unknown time   • lisinopril (PRINIVIL,ZESTRIL) 5 MG tablet Take 1 tablet by mouth Daily. 30 tablet 3 8/4/2017 at Unknown time   • loratadine (CLARITIN) 10 MG tablet Take 10 mg by mouth 2 (Two) Times a Day.   8/4/2017 at Unknown time   • meclizine (ANTIVERT) 25 MG tablet Take 25 mg  by mouth 3 (Three) Times a Day As Needed for dizziness.      • Multiple Vitamins-Minerals (MULTIVITAL) tablet Take 1 tablet by mouth Daily.      • omeprazole (priLOSEC) 40 MG capsule Take 1 capsule by mouth Daily. 90 capsule 1    • potassium chloride (K-DUR,KLOR-CON) 20 MEQ CR tablet Take 1 tablet by mouth 2 (Two) Times a Day. 60 tablet 5    • rOPINIRole (REQUIP) 2 MG tablet Take 1 tablet by mouth Every Night. 30 tablet 5 8/3/2017 at Unknown time   • sertraline (ZOLOFT) 100 MG tablet Take 1 tablet by mouth Daily. 90 tablet 1 8/4/2017 at Unknown time   • temazepam (RESTORIL) 30 MG capsule Take 30 mg by mouth At Night As Needed for Sleep.      • tiotropium (SPIRIVA HANDIHALER) 18 MCG per inhalation capsule Place 1 capsule into inhaler and inhale Daily. 90 capsule 3 8/4/2017 at Unknown time   • warfarin (COUMADIN) 3 MG tablet Take 6 mg by mouth See Admin Instructions. Patient reports she currently takes warfarin on Mondays and Tuesdays of each week.           Allergies:  Bupropion; Cephalexin; and Metoprolol    Review of Systems:  fatigue , chronic shortness of breath  Negative for following (except as per HPI):  Constitution: chills, fevers,   Eyes: change of vision, loss of vision and discharge  ENT: ear drainage, ear ringing and facial trauma  Respiratory: cough, pleuritic pain,   Cardiovascular: chest pressure, pain, lower extremity edema, palpitations  Gastrointestinal: constipation, diarrhea, nausea, vomiting, pain    Integument: rash and wound  Hematologic / Lymphatic: excessive bleeding and easy bruising  Musculoskeletal: joint pain, joint stiffness, joint swelling and muscle pain  Neurological: headaches, numbness, seizures and tremors  Behavioral / Psych: anxiety, depression and hallucinations         Vital Signs  Temp:  [97.8 °F (36.6 °C)-99.3 °F (37.4 °C)] 98.9 °F (37.2 °C)  Heart Rate:  [73-97] 79  Resp:  [18-22] 18  BP: (146-175)/() 146/70  Flowsheet Rows         First Filed Value    Admission  "Height  67\" (170.2 cm) Documented at 08/09/2017 1152    Admission Weight  166 lb (75.3 kg) Documented at 08/09/2017 1152           Physical Exam:  General Appearance:    Alert, cooperative, in no acute distress   Head:    Normocephalic, without obvious abnormality, atraumatic   Eyes:         conjunctivae and sclerae normal, no icterus, PERRLA   ENT:    Ears grossly intact, oral mucosa moist,Oxygen by nasal cannula    Neck:   No adenopathy, supple, trachea midline,    Back:     Normal to inspection, range of motion normal   Lungs:     .  Crackles in the bases ,respirations regular, even and                   unlabored    Heart:    Regular rhythm and normal rate,+ murmur and click,    Abdomen:     Normal bowel sounds, no masses,  soft non-tender, non-distended,    Extremities:   Moves all extremities well, no cyanosis, no edema,             Pulses:   Pulses palpable and equal bilaterally   Skin:   No bleeding, rash, + bruising on forearm   Neurologic:    Psych:   Cranial nerves 2 - 12 grossly intact, sensation intact,     Moves all extremities well, equal bilateral strength    Alert and Oriented x 3, Normal Affect   LABS:  Admission on 08/09/2017   Component Date Value Ref Range Status   • Glucose 08/09/2017 96  65 - 99 mg/dL Final   • BUN 08/09/2017 14  8 - 23 mg/dL Final   • Creatinine 08/09/2017 1.16* 0.57 - 1.00 mg/dL Final   • Sodium 08/09/2017 138  136 - 145 mmol/L Final   • Potassium 08/09/2017 4.0  3.5 - 5.2 mmol/L Final   • Chloride 08/09/2017 94* 98 - 107 mmol/L Final   • CO2 08/09/2017 30.7* 22.0 - 29.0 mmol/L Final   • Calcium 08/09/2017 10.9* 8.6 - 10.5 mg/dL Final   • Total Protein 08/09/2017 8.3  6.0 - 8.5 g/dL Final   • Albumin 08/09/2017 4.50  3.50 - 5.20 g/dL Final   • ALT (SGPT) 08/09/2017 27  1 - 33 U/L Final   • AST (SGOT) 08/09/2017 30  1 - 32 U/L Final   • Alkaline Phosphatase 08/09/2017 93  39 - 117 U/L Final   • Total Bilirubin 08/09/2017 0.6  0.1 - 1.2 mg/dL Final   • eGFR Non  Amer " 08/09/2017 47* >60 mL/min/1.73 Final   • Globulin 08/09/2017 3.8  gm/dL Final   • A/G Ratio 08/09/2017 1.2  g/dL Final   • BUN/Creatinine Ratio 08/09/2017 12.1  7.0 - 25.0 Final   • Anion Gap 08/09/2017 13.3  mmol/L Final   • ABO Type 08/09/2017 A   Final   • RH type 08/09/2017 Positive   Final   • Antibody Screen 08/09/2017 Negative   Final   • Protime 08/09/2017 18.3* 11.7 - 14.2 Seconds Final   • INR 08/09/2017 1.58* 0.90 - 1.10 Final   • Extra Tube 08/09/2017 hold for add-on   Final    Auto resulted   • Extra Tube 08/09/2017 Hold for add-ons.   Final    Auto resulted.   • Extra Tube 08/09/2017 hold for add-on   Final    Auto resulted   • Extra Tube 08/09/2017 Hold for add-ons.   Final    Auto resulted.   • WBC 08/09/2017 7.76  4.50 - 10.70 10*3/mm3 Final   • RBC 08/09/2017 4.13  3.90 - 5.20 10*6/mm3 Final   • Hemoglobin 08/09/2017 11.5* 11.9 - 15.5 g/dL Final   • Hematocrit 08/09/2017 36.8  35.6 - 45.5 % Final   • MCV 08/09/2017 89.1  80.5 - 98.2 fL Final   • MCH 08/09/2017 27.8  26.9 - 32.0 pg Final   • MCHC 08/09/2017 31.3* 32.4 - 36.3 g/dL Final   • RDW 08/09/2017 14.1* 11.7 - 13.0 % Final   • RDW-SD 08/09/2017 45.3  37.0 - 54.0 fl Final   • MPV 08/09/2017 11.2  6.0 - 12.0 fL Final   • Platelets 08/09/2017 254  140 - 500 10*3/mm3 Final   • Neutrophil % 08/09/2017 75.1  42.7 - 76.0 % Final   • Lymphocyte % 08/09/2017 16.6* 19.6 - 45.3 % Final   • Monocyte % 08/09/2017 4.9* 5.0 - 12.0 % Final   • Eosinophil % 08/09/2017 2.6  0.3 - 6.2 % Final   • Basophil % 08/09/2017 0.4  0.0 - 1.5 % Final   • Immature Grans % 08/09/2017 0.4  0.0 - 0.5 % Final   • Neutrophils, Absolute 08/09/2017 5.83  1.90 - 8.10 10*3/mm3 Final   • Lymphocytes, Absolute 08/09/2017 1.29  0.90 - 4.80 10*3/mm3 Final   • Monocytes, Absolute 08/09/2017 0.38  0.20 - 1.20 10*3/mm3 Final   • Eosinophils, Absolute 08/09/2017 0.20  0.00 - 0.70 10*3/mm3 Final   • Basophils, Absolute 08/09/2017 0.03  0.00 - 0.20 10*3/mm3 Final   • Immature Grans,  Absolute 08/09/2017 0.03  0.00 - 0.03 10*3/mm3 Final   • Lactate 08/09/2017 1.2  0.5 - 2.0 mmol/L Final   • proBNP 08/09/2017 994.9* 5.0 - 900.0 pg/mL Final   • Troponin T 08/09/2017 <0.010  0.000 - 0.030 ng/mL Final   • Troponin T 08/09/2017 <0.010  0.000 - 0.030 ng/mL Final       DIAGNOSTICS:  Ct Abdomen Pelvis Without Contrast    Result Date: 8/9/2017  Narrative: CT ABDOMEN AND PELVIS WITHOUT CONTRAST  HISTORY: Abdominal pain with rectal pain and bleeding. Patient underwent colonoscopy 08/04/2017.  TECHNIQUE: CT includes axial imaging from the lung bases through the trochanters without IV or oral contrast.  COMPARISON: CT abdomen and pelvis with IV contrast 03/03/2016.  FINDINGS: There is basilar pulmonary emphysema. A small hiatal hernia is present. There has been previous cholecystectomy. The liver, spleen, adrenal glands, pancreas, kidneys exhibit normal noncontrasted CT appearance. There is no hydronephrosis. There is no evidence for bowel dilatation or obstruction. There are scattered sigmoid diverticula without evidence for diverticulitis. There is no free intraperitoneal gas. Atherosclerotic calcification is present involving the abdominal aorta and iliac vasculature without aneurysm. No mateus enlargement is evident in the abdomen or pelvis. There has been previous hysterectomy.      Impression: 1. No evidence for acute intra-abdominal process. 2. Basilar pulmonary emphysema. 3. Hiatal hernia. Atherosclerotic disease. Previous cholecystectomy and hysterectomy.  This report was finalized on 8/9/2017 3:26 PM by Dr. Magdi Greer MD.      Xr Chest Pa & Lateral    Result Date: 8/9/2017  Narrative: TWO-VIEW CHEST  HISTORY: 62-year-old obese female with hypertension, COPD and emphysema presents for evaluation of shortness of breath with exertion. HISTORY includes heart failure, GI bleed, mitral valve surgery.  COMPARISON: 08/01/2017  FINDINGS: 1. Stable negative acute chest. 2. Prominent chronic changes  reidentified. 3. Borderline cardiac enlargement, vascular calcification, previous median sternotomy.  This report was finalized on 8/9/2017 3:37 PM by Dr. Valdez Infante MD.           Results Review:   I reviewed the patient's new clinical results.  Discussed with ER physician  I personally viewed and interpreted the patient's EKG/Telemetry data- XL or a junctional rhythm heart rate 79  Old records reviewed     ASSESSMENT AND PLAN    +Lower GI bleed on  Long term current use of anticoagulant due to heart valve/ recently 11 polyps removed  -Patient is in difficult situation and for the time being I will hold the Coumadin and Lovenox- could consider starting a heparin drip tomorrow or the next day if bleeding stops  -Consult cardiology to help with anticoagulation discussion  -Consult GI to see if further intervention needed  -Check H&H serially   -Monitor vital signs  -hold IV fluids due to recent chf  -patient is not hemodynamically unstable and therefore I don't think it would be in the patient's best interest to do vitamin K or FFP  -We'll monitor her H&H to see if need transfusion    +  Mitral valve replaced/Long term current use of anticoagulant/Atrial fibrillation   -consult cards, patient has been restarted on her Coumadin only for 3 days (over it was at 6 mg and she normally only is on 3 more grams a day)- therefore the INR may be more elevated tomorrow but patient is not hemodynamically unstable and therefore I don't think it would be in the patient's best interest to do vitamin K or FFP  -see above      +  Hypertension/  Pulmonary hypertension    -Stable continue medical management    + COPD on home oxygen  -stAble continue medical management      + DVT prophylaxis-SCDs and LUPE hose due to bleeding  + Medical decision maker is  [place]    I discussed the patients findings and my recommendations with the patient and/or family.  Please reference all orders placed.    Tata Newby,  MD  08/09/17  8:07 PM

## 2017-08-10 NOTE — ANESTHESIA POSTPROCEDURE EVALUATION
"Patient: Paz Browne    Procedure Summary     Date Anesthesia Start Anesthesia Stop Room / Location    08/10/17 0941 1022  [facility] /  [facility]       Procedure Diagnosis Surgeon Provider    COLONOSCOPY to cecum and TI with 2 clips placed at transverse (N/A ) Status post colonoscopy with polypectomy; Hemorrhoids  (Lower GI bleed [K92.2]; Dysplastic polyp of colon [K63.5]) MD Michael Larson MD          Anesthesia Type: MAC  Last vitals  BP   133/70 (08/10/17 1033)    Temp   36.5 °C (97.7 °F) (08/10/17 1026)    Pulse   75 (08/10/17 1033)   Resp   12 (08/10/17 1033)    SpO2   100 % (08/10/17 1033)      Post Anesthesia Care and Evaluation    Patient location during evaluation: bedside  Patient participation: complete - patient participated  Level of consciousness: awake and alert  Pain management: adequate  Airway patency: patent  Anesthetic complications: No anesthetic complications    Cardiovascular status: acceptable  Respiratory status: acceptable  Hydration status: acceptable    Comments: /70 (BP Location: Left arm, Patient Position: Lying)  Pulse 75  Temp 36.5 °C (97.7 °F) (Oral)   Resp 12  Ht 67\" (170.2 cm)  Wt 166 lb (75.3 kg)  SpO2 100%  BMI 26 kg/m2      "

## 2017-08-10 NOTE — PLAN OF CARE
Problem: Patient Care Overview (Adult)  Goal: Plan of Care Review  Outcome: Ongoing (interventions implemented as appropriate)    08/10/17 1833   Coping/Psychosocial Response Interventions   Plan Of Care Reviewed With patient   Patient Care Overview   Progress improving   Outcome Evaluation   Outcome Summary/Follow up Plan VSS, GI bleeding resolved, mobility improving, tolerating diet, pain controlled        Goal: Adult Individualization and Mutuality  Outcome: Ongoing (interventions implemented as appropriate)    08/10/17 1833   Individualization   Patient Specific Preferences no preferences made by patient at this time    Patient Specific Goals pain control, mobility improved, no signs of GI bleeding    Patient Specific Interventions pain meds prn, ambulate with assistance, oxygen sats and vitals monitored    Mutuality/Individual Preferences   What Anxieties, Fears or Concerns Do You Have About Your Health or Care? length of hospital stay, pain control, GI bleeding, mobility impaired    What Questions Do You Have About Your Health or Care? When will I be going home? When will I feel better?    What Information Would Help Us Give You More Personalized Care? Keep patient updated on plan of care, new tx orders, meds ordered, possible d/c date        Goal: Discharge Needs Assessment  Outcome: Ongoing (interventions implemented as appropriate)    08/10/17 1833   Discharge Needs Assessment   Concerns To Be Addressed no discharge needs identified;denies needs/concerns at this time   Readmission Within The Last 30 Days no previous admission in last 30 days   Equipment Needed After Discharge none   Discharge Disposition still a patient   Discharge Planning Comments plans on going home with family assistance    Current Health   Anticipated Changes Related to Illness none   Self-Care   Equipment Currently Used at Home oxygen;bath bench   Living Environment   Transportation Available car;family or friend will provide          Problem: Fall Risk (Adult)  Goal: Identify Related Risk Factors and Signs and Symptoms  Outcome: Ongoing (interventions implemented as appropriate)    08/10/17 1833   Fall Risk   Fall Risk: Related Risk Factors gait/mobility problems;environment unfamiliar;sleep pattern alteration;impaired vision   Fall Risk: Signs and Symptoms presence of risk factors       Goal: Absence of Falls  Outcome: Ongoing (interventions implemented as appropriate)    08/10/17 1833   Fall Risk (Adult)   Absence of Falls making progress toward outcome         Problem: Pain, Acute (Adult)  Goal: Identify Related Risk Factors and Signs and Symptoms  Outcome: Ongoing (interventions implemented as appropriate)    08/10/17 1833   Pain, Acute   Related Risk Factors (Acute Pain) patient perception;procedure/treatment   Signs and Symptoms (Acute Pain) constipation/diarrhea;fatigue/weakness;verbalization of pain descriptors       Goal: Acceptable Pain Control/Comfort Level  Outcome: Ongoing (interventions implemented as appropriate)    08/10/17 1833   Pain, Acute (Adult)   Acceptable Pain Control/Comfort Level making progress toward outcome         Problem: GI Endoscopy (Adult)  Goal: Signs and Symptoms of Listed Potential Problems Will be Absent or Manageable (GI Endoscopy)  Outcome: Ongoing (interventions implemented as appropriate)    08/10/17 1833   GI Endoscopy   Problems Assessed (GI Endoscopy) pain;bleeding;hypoxia/hypoxemia   Problems Present (GI Endoscopy) pain;hypoxia/hypoxemia

## 2017-08-10 NOTE — PLAN OF CARE
Problem: GI Endoscopy (Adult)  Goal: Signs and Symptoms of Listed Potential Problems Will be Absent or Manageable (GI Endoscopy)  Outcome: Ongoing (interventions implemented as appropriate)    08/10/17 0900   GI Endoscopy   Problems Assessed (GI Endoscopy) pain;bleeding;hypoxia/hypoxemia   Problems Present (GI Endoscopy) none

## 2017-08-10 NOTE — ANESTHESIA PREPROCEDURE EVALUATION
Anesthesia Evaluation     Patient summary reviewed and Nursing notes reviewed   no history of anesthetic complications:  NPO Solid Status: > 8 hours  NPO Liquid Status: > 8 hours     Airway   Mallampati: II  TM distance: >3 FB  Neck ROM: full  no difficulty expected  Dental - normal exam     Pulmonary - normal exam    breath sounds clear to auscultation  (+) pneumonia , COPD,   (-) rhonchi, decreased breath sounds, wheezes, rales, stridor  Cardiovascular - normal exam    ECG reviewed  Patient on routine beta blocker and Beta blocker given within 24 hours of surgery  Rhythm: regular  Rate: normal    (+) hypertension, valvular problems/murmurs, dysrhythmias Atrial Fib, CHF, hyperlipidemia  (-) murmur, weak pulses, friction rub, systolic click, carotid bruits, JVD, peripheral edema      Neuro/Psych- negative ROS  GI/Hepatic/Renal/Endo    (+)  hiatal hernia,     Musculoskeletal (-) negative ROS    Abdominal  - normal exam    Abdomen: soft.   Substance History - negative use     OB/GYN negative ob/gyn ROS         Other - negative ROS                                       Anesthesia Plan    ASA 3     MAC     intravenous induction   Anesthetic plan and risks discussed with patient.

## 2017-08-11 PROBLEM — R07.2 PRECORDIAL PAIN: Status: ACTIVE | Noted: 2017-08-11

## 2017-08-11 PROBLEM — Z95.4 HISTORY OF MITRAL VALVE REPLACEMENT WITH TISSUE GRAFT: Status: ACTIVE | Noted: 2017-08-11

## 2017-08-11 LAB
ANION GAP SERPL CALCULATED.3IONS-SCNC: 8.9 MMOL/L
BASOPHILS # BLD AUTO: 0.02 10*3/MM3 (ref 0–0.2)
BASOPHILS NFR BLD AUTO: 0.4 % (ref 0–1.5)
BUN BLD-MCNC: 13 MG/DL (ref 8–23)
BUN/CREAT SERPL: 14.8 (ref 7–25)
CALCIUM SPEC-SCNC: 9 MG/DL (ref 8.6–10.5)
CHLORIDE SERPL-SCNC: 102 MMOL/L (ref 98–107)
CO2 SERPL-SCNC: 30.1 MMOL/L (ref 22–29)
CREAT BLD-MCNC: 0.88 MG/DL (ref 0.57–1)
DEPRECATED RDW RBC AUTO: 46.9 FL (ref 37–54)
EOSINOPHIL # BLD AUTO: 0.17 10*3/MM3 (ref 0–0.7)
EOSINOPHIL NFR BLD AUTO: 3.2 % (ref 0.3–6.2)
ERYTHROCYTE [DISTWIDTH] IN BLOOD BY AUTOMATED COUNT: 14.1 % (ref 11.7–13)
GFR SERPL CREATININE-BSD FRML MDRD: 65 ML/MIN/1.73
GLUCOSE BLD-MCNC: 106 MG/DL (ref 65–99)
HCT VFR BLD AUTO: 29.6 % (ref 35.6–45.5)
HCT VFR BLD AUTO: 29.8 % (ref 35.6–45.5)
HGB BLD-MCNC: 9 G/DL (ref 11.9–15.5)
HGB BLD-MCNC: 9.3 G/DL (ref 11.9–15.5)
IMM GRANULOCYTES # BLD: 0 10*3/MM3 (ref 0–0.03)
IMM GRANULOCYTES NFR BLD: 0 % (ref 0–0.5)
LYMPHOCYTES # BLD AUTO: 1.24 10*3/MM3 (ref 0.9–4.8)
LYMPHOCYTES NFR BLD AUTO: 23.1 % (ref 19.6–45.3)
MCH RBC QN AUTO: 27.6 PG (ref 26.9–32)
MCHC RBC AUTO-ENTMCNC: 30.4 G/DL (ref 32.4–36.3)
MCV RBC AUTO: 90.8 FL (ref 80.5–98.2)
MONOCYTES # BLD AUTO: 0.3 10*3/MM3 (ref 0.2–1.2)
MONOCYTES NFR BLD AUTO: 5.6 % (ref 5–12)
NEUTROPHILS # BLD AUTO: 3.64 10*3/MM3 (ref 1.9–8.1)
NEUTROPHILS NFR BLD AUTO: 67.7 % (ref 42.7–76)
PLATELET # BLD AUTO: 181 10*3/MM3 (ref 140–500)
PMV BLD AUTO: 10.8 FL (ref 6–12)
POTASSIUM BLD-SCNC: 4.1 MMOL/L (ref 3.5–5.2)
RBC # BLD AUTO: 3.26 10*6/MM3 (ref 3.9–5.2)
SODIUM BLD-SCNC: 141 MMOL/L (ref 136–145)
WBC NRBC COR # BLD: 5.37 10*3/MM3 (ref 4.5–10.7)

## 2017-08-11 PROCEDURE — 94799 UNLISTED PULMONARY SVC/PX: CPT

## 2017-08-11 PROCEDURE — 25010000002 MORPHINE SULFATE (PF) 2 MG/ML SOLUTION: Performed by: INTERNAL MEDICINE

## 2017-08-11 PROCEDURE — 99232 SBSQ HOSP IP/OBS MODERATE 35: CPT | Performed by: INTERNAL MEDICINE

## 2017-08-11 PROCEDURE — 85014 HEMATOCRIT: CPT | Performed by: NURSE PRACTITIONER

## 2017-08-11 PROCEDURE — 85025 COMPLETE CBC W/AUTO DIFF WBC: CPT | Performed by: INTERNAL MEDICINE

## 2017-08-11 PROCEDURE — 85018 HEMOGLOBIN: CPT | Performed by: NURSE PRACTITIONER

## 2017-08-11 PROCEDURE — 80048 BASIC METABOLIC PNL TOTAL CA: CPT | Performed by: INTERNAL MEDICINE

## 2017-08-11 RX ORDER — FUROSEMIDE 40 MG/1
40 TABLET ORAL 2 TIMES DAILY
Status: DISCONTINUED | OUTPATIENT
Start: 2017-08-11 | End: 2017-08-15

## 2017-08-11 RX ORDER — WARFARIN SODIUM 5 MG/1
5 TABLET ORAL
Status: DISCONTINUED | OUTPATIENT
Start: 2017-08-11 | End: 2017-08-14

## 2017-08-11 RX ADMIN — MULTIPLE VITAMINS W/ MINERALS TAB 1 TABLET: TAB at 08:31

## 2017-08-11 RX ADMIN — MORPHINE SULFATE 2 MG: 2 INJECTION, SOLUTION INTRAMUSCULAR; INTRAVENOUS at 23:27

## 2017-08-11 RX ADMIN — HYDROCORTISONE ACETATE 25 MG: 25 SUPPOSITORY RECTAL at 08:31

## 2017-08-11 RX ADMIN — NITROGLYCERIN 1 INCH: 20 OINTMENT TOPICAL at 06:05

## 2017-08-11 RX ADMIN — LISINOPRIL 10 MG: 10 TABLET ORAL at 08:31

## 2017-08-11 RX ADMIN — NITROGLYCERIN 1 INCH: 20 OINTMENT TOPICAL at 17:43

## 2017-08-11 RX ADMIN — PANTOPRAZOLE SODIUM 40 MG: 40 TABLET, DELAYED RELEASE ORAL at 06:05

## 2017-08-11 RX ADMIN — BUDESONIDE AND FORMOTEROL FUMARATE DIHYDRATE 2 PUFF: 160; 4.5 AEROSOL RESPIRATORY (INHALATION) at 20:56

## 2017-08-11 RX ADMIN — HYDROCODONE BITARTRATE AND ACETAMINOPHEN 1 TABLET: 5; 325 TABLET ORAL at 09:55

## 2017-08-11 RX ADMIN — TIOTROPIUM BROMIDE 1 CAPSULE: 18 CAPSULE ORAL; RESPIRATORY (INHALATION) at 08:22

## 2017-08-11 RX ADMIN — MORPHINE SULFATE 2 MG: 2 INJECTION, SOLUTION INTRAMUSCULAR; INTRAVENOUS at 14:19

## 2017-08-11 RX ADMIN — CARVEDILOL 12.5 MG: 12.5 TABLET, FILM COATED ORAL at 08:30

## 2017-08-11 RX ADMIN — WARFARIN SODIUM 5 MG: 5 TABLET ORAL at 17:43

## 2017-08-11 RX ADMIN — FUROSEMIDE 40 MG: 40 TABLET ORAL at 19:16

## 2017-08-11 RX ADMIN — MORPHINE SULFATE 2 MG: 2 INJECTION, SOLUTION INTRAMUSCULAR; INTRAVENOUS at 19:17

## 2017-08-11 RX ADMIN — CYCLOBENZAPRINE HYDROCHLORIDE 10 MG: 10 TABLET, FILM COATED ORAL at 20:01

## 2017-08-11 RX ADMIN — TEMAZEPAM 30 MG: 15 CAPSULE ORAL at 23:27

## 2017-08-11 RX ADMIN — ATORVASTATIN CALCIUM 40 MG: 20 TABLET, FILM COATED ORAL at 08:31

## 2017-08-11 RX ADMIN — CARVEDILOL 12.5 MG: 12.5 TABLET, FILM COATED ORAL at 17:43

## 2017-08-11 RX ADMIN — SERTRALINE 100 MG: 100 TABLET, FILM COATED ORAL at 08:31

## 2017-08-11 RX ADMIN — NITROGLYCERIN 1 INCH: 20 OINTMENT TOPICAL at 11:07

## 2017-08-11 RX ADMIN — HYOSCYAMINE SULFATE 125 MCG: 0.12 TABLET ORAL at 11:07

## 2017-08-11 RX ADMIN — CETIRIZINE HYDROCHLORIDE 10 MG: 10 TABLET, FILM COATED ORAL at 08:31

## 2017-08-11 RX ADMIN — HYDROCORTISONE ACETATE 25 MG: 25 SUPPOSITORY RECTAL at 17:43

## 2017-08-11 RX ADMIN — MORPHINE SULFATE 2 MG: 2 INJECTION, SOLUTION INTRAMUSCULAR; INTRAVENOUS at 08:31

## 2017-08-11 RX ADMIN — BUDESONIDE AND FORMOTEROL FUMARATE DIHYDRATE 2 PUFF: 160; 4.5 AEROSOL RESPIRATORY (INHALATION) at 08:22

## 2017-08-11 RX ADMIN — ROPINIROLE 2 MG: 2 TABLET, FILM COATED ORAL at 20:01

## 2017-08-11 NOTE — PROGRESS NOTES
Discharge Planning Assessment  [facility]     Patient Name: Paz Browne  MRN: [id number]  Today's Date: 8/11/2017    Admit Date: 8/9/2017          Discharge Needs Assessment       08/11/17 1310    Living Environment    Lives With spouse    Living Arrangements house    Transportation Available car;family or friend will provide    Living Environment    Provides Primary Care For no one    Quality Of Family Relationships supportive    Discharge Needs Assessment    Equipment Currently Used at Home oxygen;bath bench            Discharge Plan       08/11/17 1311    Case Management/Social Work Plan    Plan Home     Patient/Family In Agreement With Plan yes    Additional Comments Spoke with patient and caregiver at bedside.  Introduced self, facesheet verified, explaiened CCP role.  Pt states she lives in house with .  12 steps into house.  Has caregiver, Rachel Rose is in room.  Pt uses O2 from [facility] at home.  Has had [facility] in past.  Denies any discharge needs at this time.  States she has plenty of support at home.  CCP will follow. BC Meza RN        Discharge Placement     No information found                Demographic Summary       08/11/17 1307    Referral Information    Admission Type inpatient    Arrived From home or self-care    Referral Source admission list    Reason For Consult discharge planning    Contact Information    Permission Granted to Share Information With family/designee   [place] Browne (spouse) [phone]    Primary Care Physician Information    Name Javan Martinez            Functional Status       08/11/17 1309    Functional Status Current    Ambulation 0-->independent    Transferring 0-->independent    Toileting 0-->independent    Bathing 0-->independent    Dressing 0-->independent    Eating 0-->independent    Communication 0-->understands/communicates without difficulty    Functional Status Prior    Ambulation 0-->independent    Transferring 0-->independent    Toileting  0-->independent    Bathing 0-->independent    Dressing 0-->independent    Eating 0-->independent    Communication 0-->understands/communicates without difficulty            Psychosocial     None            Abuse/Neglect     None            Legal     None            Substance Abuse     None            Patient Forms     None          Beverly Meza

## 2017-08-11 NOTE — PROGRESS NOTES
"    DAILY PROGRESS NOTE  [facility]    Patient Identification:  Name: Paz Browne  Age: 62 y.o.  Sex: female  :  1955  MRN: [id number]         Primary Care Physician: Javan Martinez MD    Subjective:  Interval History: no new issues - no bleeding/n/v/cp/sob    Objective:fm x1 at bs    Scheduled Meds:  atorvastatin 40 mg Oral Daily   budesonide-formoterol 2 puff Inhalation BID - RT   carvedilol 12.5 mg Oral BID With Meals   cetirizine 10 mg Oral Daily   cyclobenzaprine 10 mg Oral Nightly   hydrocortisone 25 mg Rectal BID   lisinopril 10 mg Oral Daily   multivitamin with minerals 1 tablet Oral Daily   nitroglycerin 1 inch Topical Q6H   pantoprazole 40 mg Oral QAM   rOPINIRole 2 mg Oral Nightly   sertraline 100 mg Oral Daily   tiotropium 1 capsule Inhalation Daily - RT     Continuous Infusions:     Vital signs in last 24 hours:  Temp:  [97.8 °F (36.6 °C)-98.5 °F (36.9 °C)] 98 °F (36.7 °C)  Heart Rate:  [73-97] 77  Resp:  [12-17] 16  BP: (112-158)/(53-71) 133/53    Intake/Output:    Intake/Output Summary (Last 24 hours) at 17 1315  Last data filed at 17 0934   Gross per 24 hour   Intake             1800 ml   Output             2225 ml   Net             -425 ml       Exam:  /53 (BP Location: Right arm, Patient Position: Lying)  Pulse 77  Temp 98 °F (36.7 °C) (Oral)   Resp 16  Ht 67\" (170.2 cm)  Wt 166 lb (75.3 kg)  SpO2 98%  BMI 26 kg/m2    General Appearance:    Alert, cooperative, no distress, AAOx3, clinically well                         Throat:   Lips, tongue, gums normal; oral mucosa pink and moist                         Lungs:    Clear to auscultation bilaterally, respirations unlabored                 Chest Wall:    No tenderness or deformity                          Heart:    Regular rate and rhythm, S1 and S2 normal                  Abdomen:     Soft, non-tender, bowel sounds active                 Extremities:   Extremities normal, atraumatic, no " cyanosis or edema                             Data Review:  Labs in chart were reviewed.    Assessment:  Active Hospital Problems (** Indicates Principal Problem)    Diagnosis Date Noted   • History of mitral valve replacement with tissue graft [Z95.4] 08/11/2017   • Precordial pain [R07.2] 08/11/2017   • Lower GI bleed [K92.2] 08/09/2017   • Long term current use of anticoagulant [Z79.01] 10/13/2016   • Pulmonary hypertension [I27.2]    • Hypertension [I10]    • COPD (chronic obstructive pulmonary disease) [J44.9]    • PAF (paroxysmal atrial fibrillation) [I48.0] 01/25/2016      Resolved Hospital Problems    Diagnosis Date Noted Date Resolved   No resolved problems to display.       Plan:  Cscope w/ mucosa abnormality that was clipped w/ bleeding internal hemorrhoids                        -GI ok to restart AC and Cards plans to not resume bridge so will restart at 5mg today                        -Hgb stable though did drop 10.6-9.0 and will monitor an extra day or two w/ restart of AC but hopefully w/out use of Lovenox perhaps will not exacerbate GIB further       PAF/MVR/TV w/ CP - ekg unchanged and improved troponins - Cards still has on nitro      Severe Pulm HTN - stable      HTN - ok/labile     Appreciate input and assistance from ALL    Leonel Ruiz MD  8/11/2017  1:15 PM

## 2017-08-11 NOTE — PROGRESS NOTES
BGA/GI Progress Note   Chief Complaint:  Post polypectomy bleeding    Subjective     Interval History:   Colonoscopy 8/10 with thrombosed external hemorrhoids found on perianal exam, Tortuous colon., Congested mucosa in the proximal transverse colon and in the mid transverse colon. Clips were placed.  Bleeding internal hemorrhoids.   No further overt GI bleeding. Complains of intermittent abdominal cramping that can be worse with eating. She is tolerating regular diet with excellent appetite     .   History taken from: patient chart family    Review of Systems:    All systems were reviewed and negative except for:  Gastrointestinal: postitive for  pain    Objective     Vital Signs  Temp:  [97.7 °F (36.5 °C)-98.5 °F (36.9 °C)] 97.8 °F (36.6 °C)  Heart Rate:  [72-97] 77  Resp:  [12-17] 16  BP: ()/(58-85) 152/71  Body mass index is 26 kg/(m^2).    Intake/Output Summary (Last 24 hours) at 08/11/17 1001  Last data filed at 08/11/17 0934   Gross per 24 hour   Intake             1800 ml   Output             2525 ml   Net             -725 ml     I/O this shift:  In: 360 [P.O.:360]  Out: 600 [Urine:600]    Physical Exam:   General: patient awake, alert and cooperative   Eyes: Normal lids and lashes, no scleral icterus, no conjunctival pallor   Neck: supple, normal ROM, no tracheal deviation, no thyromegaly   Skin: warm and dry, not jaundiced   Cardiovascular: regular rhythm and rate, no murmurs auscultated   Pulm: clear to auscultation bilaterally, regular and unlabored   Abdomen: soft, nontender, nondistended; normal bowel sounds   Rectal: deferred   Extremities: no rash or edema   Neurologic: Normal mood and behavior    All Medications Have Been Reviewed     Results Review:       Results from last 7 days  Lab Units 08/11/17  0601 08/10/17  0548 08/10/17  0010 08/09/17  1206   WBC 10*3/mm3 5.37 8.34  --  7.76   HEMOGLOBIN g/dL 9.0* 10.6* 10.3* 11.5*   HEMATOCRIT % 29.6* 34.2* 33.2* 36.8   PLATELETS 10*3/mm3  181 230  --  254         Results from last 7 days  Lab Units 08/11/17  0601 08/10/17  0548 08/09/17  1206   SODIUM mmol/L 141 140 138   POTASSIUM mmol/L 4.1 3.9 4.0   CHLORIDE mmol/L 102 98 94*   CO2 mmol/L 30.1* 27.2 30.7*   BUN mg/dL 13 12 14   CREATININE mg/dL 0.88 1.09* 1.16*   CALCIUM mg/dL 9.0 9.2 10.9*   BILIRUBIN mg/dL  --   --  0.6   ALK PHOS U/L  --   --  93   ALT (SGPT) U/L  --   --  27   AST (SGOT) U/L  --   --  30   GLUCOSE mg/dL 106* 118* 96         Results from last 7 days  Lab Units 08/10/17  0548 08/09/17  1207   INR  1.50* 1.58*       RADIOLOGY:    Imaging Results (last 72 hours)     Procedure Component Value Units Date/Time    CT Abdomen Pelvis Without Contrast [[id number]] Collected:  08/09/17 1337     Updated:  08/09/17 1529    Narrative:       CT ABDOMEN AND PELVIS WITHOUT CONTRAST     HISTORY: Abdominal pain with rectal pain and bleeding. Patient underwent  colonoscopy 08/04/2017.     TECHNIQUE: CT includes axial imaging from the lung bases through the  trochanters without IV or oral contrast.     COMPARISON: CT abdomen and pelvis with IV contrast 03/03/2016.     FINDINGS: There is basilar pulmonary emphysema. A small hiatal hernia is  present. There has been previous cholecystectomy. The liver, spleen,  adrenal glands, pancreas, kidneys exhibit normal noncontrasted CT  appearance. There is no hydronephrosis. There is no evidence for bowel  dilatation or obstruction. There are scattered sigmoid diverticula  without evidence for diverticulitis. There is no free intraperitoneal  gas. Atherosclerotic calcification is present involving the abdominal  aorta and iliac vasculature without aneurysm. No mateus enlargement is  evident in the abdomen or pelvis. There has been previous hysterectomy.       Impression:       1. No evidence for acute intra-abdominal process.  2. Basilar pulmonary emphysema.  3. Hiatal hernia. Atherosclerotic disease. Previous cholecystectomy and  hysterectomy.     This report  was finalized on 8/9/2017 3:26 PM by Dr. Magdi Greer MD.       XR Chest PA & Lateral [[id number]] Collected:  08/09/17 1535     Updated:  08/09/17 1540    Narrative:       TWO-VIEW CHEST     HISTORY: 62-year-old obese female with hypertension, COPD and emphysema  presents for evaluation of shortness of breath with exertion. HISTORY  includes heart failure, GI bleed, mitral valve surgery.     COMPARISON: 08/01/2017     FINDINGS:  1. Stable negative acute chest.  2. Prominent chronic changes reidentified.  3. Borderline cardiac enlargement, vascular calcification, previous  median sternotomy.     This report was finalized on 8/9/2017 3:37 PM by Dr. Valdez Infante MD.             Assessment/Plan     Patient Active Problem List   Diagnosis Code   • Mitral valve replaced Z95.2   • Atrial fibrillation [I48.91] I48.91   • Valvular heart disease I38   • Tachycardia R00.0   • Renal failure N19   • Palpitations R00.2   • Hypertension I10   • Hyperlipidemia E78.5   • COPD (chronic obstructive pulmonary disease) J44.9   • Mitral regurgitation I34.0   • Pain medication agreement Z79.899   • Hiatal hernia K44.9   • Primary osteoarthritis involving multiple joints M15.0   • Heart failure I50.9   • Pulmonary hypertension I27.2   • S/P TVR (tricuspid valve repair) Z98.890   • Long term current use of anticoagulant Z79.01   • Pulmonary nodule R91.1   • Aspiration pneumonia J69.0   • Hemoptysis R04.2   • RLS (restless legs syndrome) G25.81   • Chronic low back pain M54.5, G89.29   • Dysplastic polyp of colon K63.5   • Lower GI bleed K92.2         Montserrat Newby, APRTANMAY  08/11/17  10:01 AM    Discussed with patient, patient's family.  Still some abdominal cramping but no rectal bleeding.  Advised the hemoclips would eventually pass.  We'll see if she responds to an antispasmodic.  Impression  #1 Lower GI bleeding: resolved. Onset in the postprocedural setting. Colonoscopy with bleeding internal hemorrhoids. Congested mucosa in  transverse colon. Clips placed  #2 anticoagulant therapy: Currently on Lovenox and Coumadin  #3 ABLA- decreased H&H today.   #4 valvular heart disease  #5 pulmonary hypertension     Recommendation  Anusol for hemorrhoids  Monitor hemoglobin and hematocrit- repeat later today  No biopsies taken  Ok to resume AC tomorrow if H&H increased and no further bleeding  Add antispasmodic for abdominal cramping

## 2017-08-11 NOTE — PLAN OF CARE
Problem: Patient Care Overview (Adult)  Goal: Plan of Care Review  Outcome: Ongoing (interventions implemented as appropriate)    08/11/17 1643   Coping/Psychosocial Response Interventions   Plan Of Care Reviewed With patient   Patient Care Overview   Progress improving       Goal: Adult Individualization and Mutuality  Outcome: Ongoing (interventions implemented as appropriate)    Problem: Fall Risk (Adult)  Goal: Identify Related Risk Factors and Signs and Symptoms  Outcome: Ongoing (interventions implemented as appropriate)    08/11/17 1643   Fall Risk   Fall Risk: Related Risk Factors environment unfamiliar   Fall Risk: Signs and Symptoms presence of risk factors       Goal: Absence of Falls  Outcome: Ongoing (interventions implemented as appropriate)    08/11/17 1643   Fall Risk (Adult)   Absence of Falls making progress toward outcome         Problem: Pain, Acute (Adult)  Goal: Identify Related Risk Factors and Signs and Symptoms  Outcome: Ongoing (interventions implemented as appropriate)    08/11/17 1643   Pain, Acute   Related Risk Factors (Acute Pain) patient perception;persistent pain;procedure/treatment   Signs and Symptoms (Acute Pain) facial mask of pain/grimace;questions meaning of pain;verbalization of pain descriptors       Goal: Acceptable Pain Control/Comfort Level  Outcome: Ongoing (interventions implemented as appropriate)    08/11/17 1643   Pain, Acute (Adult)   Acceptable Pain Control/Comfort Level making progress toward outcome         Problem: GI Endoscopy (Adult)  Goal: Signs and Symptoms of Listed Potential Problems Will be Absent or Manageable (GI Endoscopy)  Outcome: Ongoing (interventions implemented as appropriate)    08/11/17 1643   GI Endoscopy   Problems Assessed (GI Endoscopy) all   Problems Present (GI Endoscopy) pain;situational response

## 2017-08-11 NOTE — PROGRESS NOTES
LOS: 2 days   Patient Care Team:  Javan Martinez MD as PCP - General  Javan Martinez MD as PCP - Family Medicine    Chief Complaint: here for LGIB       Interval History: Still has intermittent chest pains.  She has a lot of abdominal cramping and discomfort.      Objective   Vital Signs  Temp:  [97.8 °F (36.6 °C)-98.5 °F (36.9 °C)] 98 °F (36.7 °C)  Heart Rate:  [73-97] 77  Resp:  [12-17] 16  BP: (112-158)/(53-71) 133/53    Intake/Output Summary (Last 24 hours) at 08/11/17 1207  Last data filed at 08/11/17 0934   Gross per 24 hour   Intake             1800 ml   Output             2225 ml   Net             -425 ml           Physical Exam   Constitutional: She is oriented to person, place, and time. She appears well-developed and well-nourished.   HENT:   Head: Normocephalic.   Nose: Nose normal.   Mouth/Throat: Oropharynx is clear and moist.   Eyes: Conjunctivae and EOM are normal. Pupils are equal, round, and reactive to light.   Neck: Normal range of motion. No JVD present.   Cardiovascular: Normal rate, regular rhythm and intact distal pulses.    Murmur heard.   Systolic murmur is present with a grade of 2/6   Pulmonary/Chest: Effort normal and breath sounds normal.   Abdominal: Soft. She exhibits no mass. There is no tenderness.   Musculoskeletal: Normal range of motion. She exhibits no edema.   Neurological: She is alert and oriented to person, place, and time. No cranial nerve deficit.   Skin: Skin is warm and dry. No erythema.   Psychiatric: She has a normal mood and affect. Her behavior is normal. Judgment and thought content normal.   Vitals reviewed.      Results Review:        Results from last 7 days  Lab Units 08/11/17  0601 08/10/17  0548 08/09/17  1206   SODIUM mmol/L 141 140 138   POTASSIUM mmol/L 4.1 3.9 4.0   CHLORIDE mmol/L 102 98 94*   CO2 mmol/L 30.1* 27.2 30.7*   BUN mg/dL 13 12 14   CREATININE mg/dL 0.88 1.09* 1.16*   GLUCOSE mg/dL 106* 118* 96   CALCIUM mg/dL 9.0 9.2 10.9*       Results  from last 7 days  Lab Units 08/10/17  0548 08/09/17  1810 08/09/17  1206   TROPONIN T ng/mL <0.010 <0.010 <0.010       Results from last 7 days  Lab Units 08/11/17  0601 08/10/17  0548 08/10/17  0010 08/09/17  1206   WBC 10*3/mm3 5.37 8.34  --  7.76   HEMOGLOBIN g/dL 9.0* 10.6* 10.3* 11.5*   HEMATOCRIT % 29.6* 34.2* 33.2* 36.8   PLATELETS 10*3/mm3 181 230  --  254       Results from last 7 days  Lab Units 08/10/17  0548 08/09/17  1207   INR  1.50* 1.58*       Results from last 7 days  Lab Units 08/10/17  0548   CHOLESTEROL mg/dL 163       Results from last 7 days  Lab Units 08/10/17  0548   MAGNESIUM mg/dL 2.0       Results from last 7 days  Lab Units 08/10/17  0548   CHOLESTEROL mg/dL 163   TRIGLYCERIDES mg/dL 129   HDL CHOL mg/dL 58       I reviewed the patient's new clinical results.    I personally viewed and interpreted the patient's EKG/Telemetry data --SR, lots of artifact        Medication Review:     atorvastatin 40 mg Oral Daily   budesonide-formoterol 2 puff Inhalation BID - RT   carvedilol 12.5 mg Oral BID With Meals   cetirizine 10 mg Oral Daily   cyclobenzaprine 10 mg Oral Nightly   hydrocortisone 25 mg Rectal BID   lisinopril 10 mg Oral Daily   multivitamin with minerals 1 tablet Oral Daily   nitroglycerin 1 inch Topical Q6H   pantoprazole 40 mg Oral QAM   rOPINIRole 2 mg Oral Nightly   sertraline 100 mg Oral Daily   tiotropium 1 capsule Inhalation Daily - RT            Assessment/Plan     1.  LGIB -- post-polypectomy bleeding.  Hgb is still dropping.    2.  PAF -- she's in NSR, and her Hgb is still dropping.  Consider restarting warfarin without a bridge tomorrow if she stabilizes.    3.  History of tissue MVR -- this does not require OAC.    4.  Chest pain -- she had normal cors in June 2016.  Her EKG is unchanged.  I suspect this may be GI in nature.  I will change nitropaste to ISMN.    5.  HTN -- stable    6.  Oxygen dependent COPD/pulmonary hypertension       Rodolfo Null MD  08/11/17  12:07  PM

## 2017-08-12 LAB
ANION GAP SERPL CALCULATED.3IONS-SCNC: 11.9 MMOL/L
BUN BLD-MCNC: 17 MG/DL (ref 8–23)
BUN/CREAT SERPL: 14 (ref 7–25)
CALCIUM SPEC-SCNC: 9.2 MG/DL (ref 8.6–10.5)
CHLORIDE SERPL-SCNC: 100 MMOL/L (ref 98–107)
CO2 SERPL-SCNC: 28.1 MMOL/L (ref 22–29)
CREAT BLD-MCNC: 1.21 MG/DL (ref 0.57–1)
DEPRECATED RDW RBC AUTO: 47.1 FL (ref 37–54)
ERYTHROCYTE [DISTWIDTH] IN BLOOD BY AUTOMATED COUNT: 14.1 % (ref 11.7–13)
GFR SERPL CREATININE-BSD FRML MDRD: 45 ML/MIN/1.73
GLUCOSE BLD-MCNC: 109 MG/DL (ref 65–99)
HCT VFR BLD AUTO: 28.9 % (ref 35.6–45.5)
HGB BLD-MCNC: 8.9 G/DL (ref 11.9–15.5)
INR PPP: 1.22 (ref 0.9–1.1)
MCH RBC QN AUTO: 27.8 PG (ref 26.9–32)
MCHC RBC AUTO-ENTMCNC: 30.8 G/DL (ref 32.4–36.3)
MCV RBC AUTO: 90.3 FL (ref 80.5–98.2)
PLATELET # BLD AUTO: 189 10*3/MM3 (ref 140–500)
PMV BLD AUTO: 11.1 FL (ref 6–12)
POTASSIUM BLD-SCNC: 4.5 MMOL/L (ref 3.5–5.2)
PROTHROMBIN TIME: 15 SECONDS (ref 11.7–14.2)
RBC # BLD AUTO: 3.2 10*6/MM3 (ref 3.9–5.2)
SODIUM BLD-SCNC: 140 MMOL/L (ref 136–145)
WBC NRBC COR # BLD: 7.53 10*3/MM3 (ref 4.5–10.7)

## 2017-08-12 PROCEDURE — 94799 UNLISTED PULMONARY SVC/PX: CPT

## 2017-08-12 PROCEDURE — 80048 BASIC METABOLIC PNL TOTAL CA: CPT | Performed by: INTERNAL MEDICINE

## 2017-08-12 PROCEDURE — 99232 SBSQ HOSP IP/OBS MODERATE 35: CPT | Performed by: INTERNAL MEDICINE

## 2017-08-12 PROCEDURE — 85027 COMPLETE CBC AUTOMATED: CPT | Performed by: HOSPITALIST

## 2017-08-12 PROCEDURE — 25010000002 MORPHINE SULFATE (PF) 2 MG/ML SOLUTION: Performed by: INTERNAL MEDICINE

## 2017-08-12 PROCEDURE — 85610 PROTHROMBIN TIME: CPT | Performed by: HOSPITALIST

## 2017-08-12 RX ORDER — ECHINACEA PURPUREA EXTRACT 125 MG
2 TABLET ORAL AS NEEDED
Status: DISCONTINUED | OUTPATIENT
Start: 2017-08-12 | End: 2017-08-25 | Stop reason: HOSPADM

## 2017-08-12 RX ADMIN — FUROSEMIDE 40 MG: 40 TABLET ORAL at 08:01

## 2017-08-12 RX ADMIN — TEMAZEPAM 30 MG: 15 CAPSULE ORAL at 21:48

## 2017-08-12 RX ADMIN — TIOTROPIUM BROMIDE 1 CAPSULE: 18 CAPSULE ORAL; RESPIRATORY (INHALATION) at 07:51

## 2017-08-12 RX ADMIN — NITROGLYCERIN 1 INCH: 20 OINTMENT TOPICAL at 11:44

## 2017-08-12 RX ADMIN — LISINOPRIL 10 MG: 10 TABLET ORAL at 08:01

## 2017-08-12 RX ADMIN — HYDROCORTISONE ACETATE 25 MG: 25 SUPPOSITORY RECTAL at 08:01

## 2017-08-12 RX ADMIN — SERTRALINE 100 MG: 100 TABLET, FILM COATED ORAL at 08:01

## 2017-08-12 RX ADMIN — CARVEDILOL 12.5 MG: 12.5 TABLET, FILM COATED ORAL at 17:53

## 2017-08-12 RX ADMIN — NITROGLYCERIN 1 INCH: 20 OINTMENT TOPICAL at 17:53

## 2017-08-12 RX ADMIN — ATORVASTATIN CALCIUM 40 MG: 20 TABLET, FILM COATED ORAL at 08:01

## 2017-08-12 RX ADMIN — MULTIPLE VITAMINS W/ MINERALS TAB 1 TABLET: TAB at 08:01

## 2017-08-12 RX ADMIN — PANTOPRAZOLE SODIUM 40 MG: 40 TABLET, DELAYED RELEASE ORAL at 06:07

## 2017-08-12 RX ADMIN — MORPHINE SULFATE 2 MG: 2 INJECTION, SOLUTION INTRAMUSCULAR; INTRAVENOUS at 03:36

## 2017-08-12 RX ADMIN — BUDESONIDE AND FORMOTEROL FUMARATE DIHYDRATE 2 PUFF: 160; 4.5 AEROSOL RESPIRATORY (INHALATION) at 19:37

## 2017-08-12 RX ADMIN — NITROGLYCERIN 1 INCH: 20 OINTMENT TOPICAL at 06:06

## 2017-08-12 RX ADMIN — MORPHINE SULFATE 2 MG: 2 INJECTION, SOLUTION INTRAMUSCULAR; INTRAVENOUS at 20:23

## 2017-08-12 RX ADMIN — BUDESONIDE AND FORMOTEROL FUMARATE DIHYDRATE 2 PUFF: 160; 4.5 AEROSOL RESPIRATORY (INHALATION) at 07:50

## 2017-08-12 RX ADMIN — MORPHINE SULFATE 2 MG: 2 INJECTION, SOLUTION INTRAMUSCULAR; INTRAVENOUS at 07:58

## 2017-08-12 RX ADMIN — WARFARIN SODIUM 5 MG: 5 TABLET ORAL at 17:53

## 2017-08-12 RX ADMIN — MORPHINE SULFATE 2 MG: 2 INJECTION, SOLUTION INTRAMUSCULAR; INTRAVENOUS at 11:44

## 2017-08-12 RX ADMIN — ROPINIROLE 2 MG: 2 TABLET, FILM COATED ORAL at 20:23

## 2017-08-12 RX ADMIN — CETIRIZINE HYDROCHLORIDE 10 MG: 10 TABLET, FILM COATED ORAL at 08:01

## 2017-08-12 RX ADMIN — HYDROCORTISONE ACETATE 25 MG: 25 SUPPOSITORY RECTAL at 17:53

## 2017-08-12 RX ADMIN — CARVEDILOL 12.5 MG: 12.5 TABLET, FILM COATED ORAL at 07:58

## 2017-08-12 RX ADMIN — CYCLOBENZAPRINE HYDROCHLORIDE 10 MG: 10 TABLET, FILM COATED ORAL at 20:23

## 2017-08-12 RX ADMIN — MORPHINE SULFATE 2 MG: 2 INJECTION, SOLUTION INTRAMUSCULAR; INTRAVENOUS at 16:15

## 2017-08-12 RX ADMIN — FUROSEMIDE 40 MG: 40 TABLET ORAL at 17:53

## 2017-08-12 NOTE — PROGRESS NOTES
[facility]  Inpatient Progress Note    Reason for Follow Up:  Rectal bleeding    Subjective     Interval History:   No rectal bleeding. C/o some postprandial upper abdominal bloating discomfort. I reviewed the CT abd/pevlis done 8/9/17.    Current Facility-Administered Medications:   •  acetaminophen (TYLENOL) tablet 650 mg, 650 mg, Oral, Q6H PRN, Tata Newby MD  •  albuterol (PROVENTIL) nebulizer solution 0.083% 2.5 mg/3mL, 2.5 mg, Nebulization, BID PRN, Tata Newby MD  •  atorvastatin (LIPITOR) tablet 40 mg, 40 mg, Oral, Daily, Tata Newby MD, 40 mg at 08/12/17 0801  •  bisacodyl (DULCOLAX) suppository 10 mg, 10 mg, Rectal, Daily PRN, Tata Newby MD  •  budesonide-formoterol (SYMBICORT) 160-4.5 MCG/ACT inhaler 2 puff, 2 puff, Inhalation, BID - RT, Tata Newby MD, 2 puff at 08/12/17 0750  •  calcium carbonate (TUMS) chewable tablet 500 mg (200 mg elemental), 1 tablet, Oral, BID PRN, Tata Newby MD  •  carvedilol (COREG) tablet 12.5 mg, 12.5 mg, Oral, BID With Meals, Kirti Arteaga MD, 12.5 mg at 08/12/17 0758  •  cetirizine (zyrTEC) tablet 10 mg, 10 mg, Oral, Daily, Tata Newby MD, 10 mg at 08/12/17 0801  •  cyclobenzaprine (FLEXERIL) tablet 10 mg, 10 mg, Oral, Nightly, Tata Newby MD, 10 mg at 08/11/17 2001  •  furosemide (LASIX) tablet 40 mg, 40 mg, Oral, BID, Leonel Ruiz MD, 40 mg at 08/12/17 0801  •  HYDROcodone-acetaminophen (NORCO) 5-325 MG per tablet 1 tablet, 1 tablet, Oral, Q4H PRN, Tata Newby MD, 1 tablet at 08/11/17 0955  •  hydrocortisone (ANUSOL-HC) suppository 25 mg, 25 mg, Rectal, BID, Earnest PALOMO MD, 25 mg at 08/12/17 0801  •  hyoscyamine (LEVSIN) SL tablet 125 mcg, 125 mcg, Sublingual, Q4H PRN, Montserrat Nweby, APRN, 125 mcg at 08/11/17 1107  •  lisinopril (PRINIVIL,ZESTRIL) tablet 10 mg, 10 mg, Oral, Daily, Kirti Arteaga MD, 10 mg at 08/12/17  0801  •  magnesium hydroxide (MILK OF MAGNESIA) suspension 2400 mg/10mL 10 mL, 10 mL, Oral, Daily PRN, Tata Newby MD  •  meclizine (ANTIVERT) tablet 25 mg, 25 mg, Oral, TID PRN, Tata Newby MD  •  Morphine sulfate (PF) injection 2 mg, 2 mg, Intravenous, Q4H PRN, 2 mg at 08/12/17 0758 **AND** naloxone (NARCAN) injection 0.4 mg, 0.4 mg, Intravenous, Q5 Min PRN, Tata Newby MD  •  multivitamin with minerals 1 tablet, 1 tablet, Oral, Daily, Tata Newby MD, 1 tablet at 08/12/17 0801  •  nitroglycerin (NITROSTAT) ointment 1 inch, 1 inch, Topical, Q6H, Kirti Arteaga MD, 1 inch at 08/12/17 0606  •  nitroglycerin (NITROSTAT) SL tablet 0.4 mg, 0.4 mg, Sublingual, Q5 Min PRN, Tata Newby MD  •  ondansetron (ZOFRAN) injection 4 mg, 4 mg, Intravenous, Q6H PRN, Tata Newby MD  •  pantoprazole (PROTONIX) EC tablet 40 mg, 40 mg, Oral, QAM, Tata Newby MD, 40 mg at 08/12/17 0607  •  rOPINIRole (REQUIP) tablet 2 mg, 2 mg, Oral, Nightly, Tata Newby MD, 2 mg at 08/11/17 2001  •  sertraline (ZOLOFT) tablet 100 mg, 100 mg, Oral, Daily, Tata Newby MD, 100 mg at 08/12/17 0801  •  sodium chloride 0.9 % flush 1-10 mL, 1-10 mL, Intravenous, PRN, Tata Newby MD  •  sodium chloride 0.9 % flush 10 mL, 10 mL, Intravenous, PRN, Amrit Reinoso MD  •  temazepam (RESTORIL) capsule 30 mg, 30 mg, Oral, Nightly PRN, Tata Newby MD, 30 mg at 08/11/17 6918  •  tiotropium (SPIRIVA) 18 MCG per inhalation capsule 1 capsule, 1 capsule, Inhalation, Daily - RT, Tata Newby MD, 1 capsule at 08/12/17 1785  •  warfarin (COUMADIN) tablet 5 mg, 5 mg, Oral, Daily, Leonel Ruiz MD, 5 mg at 08/11/17 1743  Review of Systems:    The following systems were reviewed and negative;  cardiovascular    Objective     Vital Signs  Temp:  [97.7 °F (36.5 °C)-98.2 °F (36.8 °C)] 98.2 °F (36.8 °C)  Heart Rate:  [67-77] 69  Resp:   [12-16] 16  BP: (101-139)/(48-62) 123/57  Body mass index is 26 kg/(m^2).    Intake/Output Summary (Last 24 hours) at 08/12/17 0945  Last data filed at 08/12/17 0853   Gross per 24 hour   Intake             1750 ml   Output             2200 ml   Net             -450 ml     I/O this shift:  In: 570 [P.O.:570]  Out: -      Physical Exam:   General: patient awake, alert and cooperative   Eyes: Normal lids and lashes, no scleral icterus   Neck: supple, normal ROM   Skin: warm and dry, not jaundiced   Cardiovascular: regular rhythm and rate, no murmurs auscultated   Pulm: clear to auscultation bilaterally, regular and unlabored   Abdomen: soft, nontender, nondistended; normal bowel sounds   Rectal: deferred   Extremities: no rash or edema   Psychiatric: Normal mood and behavior; memory intact     Results Review:     I reviewed the patient's new clinical results.      Results from last 7 days  Lab Units 08/12/17  0410 08/11/17  1540 08/11/17  0601 08/10/17  0548   WBC 10*3/mm3 7.53  --  5.37 8.34   HEMOGLOBIN g/dL 8.9* 9.3* 9.0* 10.6*   HEMATOCRIT % 28.9* 29.8* 29.6* 34.2*   PLATELETS 10*3/mm3 189  --  181 230       Results from last 7 days  Lab Units 08/12/17  0410 08/11/17  0601 08/10/17  0548 08/09/17  1206   SODIUM mmol/L 140 141 140 138   POTASSIUM mmol/L 4.5 4.1 3.9 4.0   CHLORIDE mmol/L 100 102 98 94*   CO2 mmol/L 28.1 30.1* 27.2 30.7*   BUN mg/dL 17 13 12 14   CREATININE mg/dL 1.21* 0.88 1.09* 1.16*   CALCIUM mg/dL 9.2 9.0 9.2 10.9*   BILIRUBIN mg/dL  --   --   --  0.6   ALK PHOS U/L  --   --   --  93   ALT (SGPT) U/L  --   --   --  27   AST (SGOT) U/L  --   --   --  30   GLUCOSE mg/dL 109* 106* 118* 96       Results from last 7 days  Lab Units 08/12/17  0410 08/10/17  0548 08/09/17  1207   INR  1.22* 1.50* 1.58*     No results found for: LIPASE    Radiology:  XR Chest PA & Lateral   Final Result      CT Abdomen Pelvis Without Contrast   Final Result   1. No evidence for acute intra-abdominal process.   2. Basilar  pulmonary emphysema.   3. Hiatal hernia. Atherosclerotic disease. Previous cholecystectomy and   hysterectomy.       This report was finalized on 8/9/2017 3:26 PM by Dr. Magdi Greer MD.          US Gallbladder    (Results Pending)       Assessment/Plan     Patient Active Problem List   Diagnosis   • PAF (paroxysmal atrial fibrillation)   • Hypertension   • Hyperlipidemia   • COPD (chronic obstructive pulmonary disease)   • Pain medication agreement   • Hiatal hernia   • Primary osteoarthritis involving multiple joints   • Pulmonary hypertension   • S/P TVR (tricuspid valve repair)   • Long term current use of anticoagulant   • Pulmonary nodule   • Hemoptysis   • RLS (restless legs syndrome)   • Chronic low back pain   • Dysplastic polyp of colon   • Lower GI bleed   • History of mitral valve replacement with tissue graft   • Precordial pain       I discussed the patients findings and my recommendations with patient.    Assessment:  1) No more rectal bleeding but her H/H is still trending down. Continue serial H/H. I will check anemia labs. Anticoagulation has been restarted.  2) Upper abdominal bloating discomfort. I will check labs and check an US of the gallbladder.  3) Anal pain from hemorrhoids - continue Anusol HC Suppositories.    Cleveland Devine MD

## 2017-08-12 NOTE — PLAN OF CARE
Problem: Patient Care Overview (Adult)  Goal: Plan of Care Review  Outcome: Ongoing (interventions implemented as appropriate)    08/12/17 1715   Coping/Psychosocial Response Interventions   Plan Of Care Reviewed With patient;family   Patient Care Overview   Progress improving       Goal: Adult Individualization and Mutuality  Outcome: Ongoing (interventions implemented as appropriate)    Problem: Fall Risk (Adult)  Goal: Identify Related Risk Factors and Signs and Symptoms  Outcome: Ongoing (interventions implemented as appropriate)    08/11/17 1643   Fall Risk   Fall Risk: Related Risk Factors environment unfamiliar   Fall Risk: Signs and Symptoms presence of risk factors       Goal: Absence of Falls  Outcome: Ongoing (interventions implemented as appropriate)    08/12/17 1715   Fall Risk (Adult)   Absence of Falls making progress toward outcome         Problem: Pain, Acute (Adult)  Goal: Identify Related Risk Factors and Signs and Symptoms  Outcome: Ongoing (interventions implemented as appropriate)    08/11/17 1643   Pain, Acute   Related Risk Factors (Acute Pain) patient perception;persistent pain;procedure/treatment   Signs and Symptoms (Acute Pain) facial mask of pain/grimace;questions meaning of pain;verbalization of pain descriptors       Goal: Acceptable Pain Control/Comfort Level  Outcome: Ongoing (interventions implemented as appropriate)    08/12/17 1715   Pain, Acute (Adult)   Acceptable Pain Control/Comfort Level making progress toward outcome         Problem: GI Endoscopy (Adult)  Goal: Signs and Symptoms of Listed Potential Problems Will be Absent or Manageable (GI Endoscopy)  Outcome: Ongoing (interventions implemented as appropriate)    08/11/17 1643   GI Endoscopy   Problems Assessed (GI Endoscopy) all   Problems Present (GI Endoscopy) pain;situational response

## 2017-08-12 NOTE — PROGRESS NOTES
"    DAILY PROGRESS NOTE  [facility]    Patient Identification:  Name: Paz Browne  Age: 62 y.o.  Sex: female  :  1955  MRN: [id number]         Primary Care Physician: Javan Martinez MD    Subjective:  Interval History: no new issues - no n/v/d - no further bleeding     Objective:no fm    Scheduled Meds:  atorvastatin 40 mg Oral Daily   budesonide-formoterol 2 puff Inhalation BID - RT   carvedilol 12.5 mg Oral BID With Meals   cetirizine 10 mg Oral Daily   cyclobenzaprine 10 mg Oral Nightly   furosemide 40 mg Oral BID   hydrocortisone 25 mg Rectal BID   lisinopril 10 mg Oral Daily   multivitamin with minerals 1 tablet Oral Daily   nitroglycerin 1 inch Topical Q6H   pantoprazole 40 mg Oral QAM   rOPINIRole 2 mg Oral Nightly   sertraline 100 mg Oral Daily   tiotropium 1 capsule Inhalation Daily - RT   warfarin 5 mg Oral Daily     Continuous Infusions:     Vital signs in last 24 hours:  Temp:  [97.7 °F (36.5 °C)-98.2 °F (36.8 °C)] 98.2 °F (36.8 °C)  Heart Rate:  [67-75] 67  Resp:  [12-16] 16  BP: (101-145)/(48-92) 145/92    Intake/Output:    Intake/Output Summary (Last 24 hours) at 17 1322  Last data filed at 17 1152   Gross per 24 hour   Intake             1510 ml   Output             3000 ml   Net            -1490 ml       Exam:  /92 (BP Location: Left arm, Patient Position: Lying)  Pulse 67  Temp 98.2 °F (36.8 °C) (Oral)   Resp 16  Ht 67\" (170.2 cm)  Wt 166 lb (75.3 kg)  SpO2 100%  BMI 26 kg/m2    General Appearance:    Alert, cooperative, no distress, AAOx3                         Throat:   Lips, tongue, gums normal; oral mucosa pink and moist                           Neck:   Supple, no JVD                         Lungs:    Clear to auscultation bilaterally, respirations unlabored                          Heart:    Regular rate and rhythm, S1 and S2 normal                  Abdomen:     Soft, non-tender, bowel sounds active                 Extremities:   " Extremities normal, atraumatic, no cyanosis or edema                         Data Review:  Labs in chart were reviewed.    Assessment:  Active Hospital Problems (** Indicates Principal Problem)    Diagnosis Date Noted   • History of mitral valve replacement with tissue graft [Z95.4] 08/11/2017   • Precordial pain [R07.2] 08/11/2017   • Lower GI bleed [K92.2] 08/09/2017   • Long term current use of anticoagulant [Z79.01] 10/13/2016   • Pulmonary hypertension [I27.2]    • Hypertension [I10]    • COPD (chronic obstructive pulmonary disease) [J44.9]    • PAF (paroxysmal atrial fibrillation) [I48.0] 01/25/2016      Resolved Hospital Problems    Diagnosis Date Noted Date Resolved   No resolved problems to display.       Plan:  Cscope w/ mucosa abnormality that was clipped w/ bleeding internal hemorrhoids                        -GI ok to restart AC and Cards plans to not resume bridge so restarted at 5mg on 8/11/17 and no return of bleeding                        -Hgb stable though did drop 10.6-9.0-9.3-8.9 and appears to be stabilizing             -watch another day w/ AC to ensure safe to go home        PAF/MVR/TV w/ CP - ekg unchanged and improved troponins - Cards still has on nitro       Severe Pulm HTN - stable       HTN - ok/labile        Leonel Ruiz MD  8/12/2017  1:22 PM

## 2017-08-13 PROBLEM — J96.11 CHRONIC RESPIRATORY FAILURE WITH HYPOXIA (HCC): Status: ACTIVE | Noted: 2017-08-13

## 2017-08-13 LAB
ALBUMIN SERPL-MCNC: 4.1 G/DL (ref 3.5–5.2)
ALBUMIN/GLOB SERPL: 1.5 G/DL
ALP SERPL-CCNC: 77 U/L (ref 39–117)
ALT SERPL W P-5'-P-CCNC: 24 U/L (ref 1–33)
AMYLASE SERPL-CCNC: 64 U/L (ref 28–100)
ANION GAP SERPL CALCULATED.3IONS-SCNC: 13.8 MMOL/L
AST SERPL-CCNC: 20 U/L (ref 1–32)
BASOPHILS # BLD AUTO: 0.03 10*3/MM3 (ref 0–0.2)
BASOPHILS NFR BLD AUTO: 0.4 % (ref 0–1.5)
BILIRUB SERPL-MCNC: 0.5 MG/DL (ref 0.1–1.2)
BUN BLD-MCNC: 23 MG/DL (ref 8–23)
BUN/CREAT SERPL: 18.1 (ref 7–25)
CALCIUM SPEC-SCNC: 9.6 MG/DL (ref 8.6–10.5)
CHLORIDE SERPL-SCNC: 99 MMOL/L (ref 98–107)
CO2 SERPL-SCNC: 28.2 MMOL/L (ref 22–29)
CREAT BLD-MCNC: 1.27 MG/DL (ref 0.57–1)
DEPRECATED RDW RBC AUTO: 46.4 FL (ref 37–54)
EOSINOPHIL # BLD AUTO: 0.27 10*3/MM3 (ref 0–0.7)
EOSINOPHIL NFR BLD AUTO: 3.9 % (ref 0.3–6.2)
ERYTHROCYTE [DISTWIDTH] IN BLOOD BY AUTOMATED COUNT: 14.2 % (ref 11.7–13)
FERRITIN SERPL-MCNC: 51.41 NG/ML (ref 13–150)
GFR SERPL CREATININE-BSD FRML MDRD: 42 ML/MIN/1.73
GLOBULIN UR ELPH-MCNC: 2.8 GM/DL
GLUCOSE BLD-MCNC: 105 MG/DL (ref 65–99)
HCT VFR BLD AUTO: 30.3 % (ref 35.6–45.5)
HGB BLD-MCNC: 9.3 G/DL (ref 11.9–15.5)
IMM GRANULOCYTES # BLD: 0 10*3/MM3 (ref 0–0.03)
IMM GRANULOCYTES NFR BLD: 0 % (ref 0–0.5)
IRON 24H UR-MRATE: 25 MCG/DL (ref 37–145)
IRON SATN MFR SERPL: 6 % (ref 20–50)
LIPASE SERPL-CCNC: 46 U/L (ref 13–60)
LYMPHOCYTES # BLD AUTO: 1.39 10*3/MM3 (ref 0.9–4.8)
LYMPHOCYTES NFR BLD AUTO: 20.2 % (ref 19.6–45.3)
MCH RBC QN AUTO: 27.4 PG (ref 26.9–32)
MCHC RBC AUTO-ENTMCNC: 30.7 G/DL (ref 32.4–36.3)
MCV RBC AUTO: 89.1 FL (ref 80.5–98.2)
MONOCYTES # BLD AUTO: 0.35 10*3/MM3 (ref 0.2–1.2)
MONOCYTES NFR BLD AUTO: 5.1 % (ref 5–12)
NEUTROPHILS # BLD AUTO: 4.85 10*3/MM3 (ref 1.9–8.1)
NEUTROPHILS NFR BLD AUTO: 70.4 % (ref 42.7–76)
PLATELET # BLD AUTO: 187 10*3/MM3 (ref 140–500)
PMV BLD AUTO: 10.9 FL (ref 6–12)
POTASSIUM BLD-SCNC: 4 MMOL/L (ref 3.5–5.2)
PROT SERPL-MCNC: 6.9 G/DL (ref 6–8.5)
RBC # BLD AUTO: 3.4 10*6/MM3 (ref 3.9–5.2)
SODIUM BLD-SCNC: 141 MMOL/L (ref 136–145)
TIBC SERPL-MCNC: 446 MCG/DL (ref 298–536)
TRANSFERRIN SERPL-MCNC: 299 MG/DL (ref 200–360)
VIT B12 BLD-MCNC: 892 PG/ML (ref 211–946)
WBC NRBC COR # BLD: 6.89 10*3/MM3 (ref 4.5–10.7)

## 2017-08-13 PROCEDURE — 82728 ASSAY OF FERRITIN: CPT | Performed by: INTERNAL MEDICINE

## 2017-08-13 PROCEDURE — 82747 ASSAY OF FOLIC ACID RBC: CPT | Performed by: INTERNAL MEDICINE

## 2017-08-13 PROCEDURE — 99232 SBSQ HOSP IP/OBS MODERATE 35: CPT | Performed by: INTERNAL MEDICINE

## 2017-08-13 PROCEDURE — 25010000002 MORPHINE SULFATE (PF) 2 MG/ML SOLUTION: Performed by: INTERNAL MEDICINE

## 2017-08-13 PROCEDURE — 83690 ASSAY OF LIPASE: CPT | Performed by: INTERNAL MEDICINE

## 2017-08-13 PROCEDURE — 85014 HEMATOCRIT: CPT | Performed by: INTERNAL MEDICINE

## 2017-08-13 PROCEDURE — 85025 COMPLETE CBC W/AUTO DIFF WBC: CPT | Performed by: INTERNAL MEDICINE

## 2017-08-13 PROCEDURE — 84466 ASSAY OF TRANSFERRIN: CPT | Performed by: INTERNAL MEDICINE

## 2017-08-13 PROCEDURE — 82607 VITAMIN B-12: CPT | Performed by: INTERNAL MEDICINE

## 2017-08-13 PROCEDURE — 82150 ASSAY OF AMYLASE: CPT | Performed by: INTERNAL MEDICINE

## 2017-08-13 PROCEDURE — 80053 COMPREHEN METABOLIC PANEL: CPT | Performed by: INTERNAL MEDICINE

## 2017-08-13 PROCEDURE — 94799 UNLISTED PULMONARY SVC/PX: CPT

## 2017-08-13 PROCEDURE — 83540 ASSAY OF IRON: CPT | Performed by: INTERNAL MEDICINE

## 2017-08-13 RX ORDER — FERROUS SULFATE 325(65) MG
325 TABLET ORAL 2 TIMES DAILY WITH MEALS
Status: DISCONTINUED | OUTPATIENT
Start: 2017-08-13 | End: 2017-08-25 | Stop reason: HOSPADM

## 2017-08-13 RX ORDER — POLYETHYLENE GLYCOL 3350 17 G/17G
17 POWDER, FOR SOLUTION ORAL DAILY
Status: DISCONTINUED | OUTPATIENT
Start: 2017-08-13 | End: 2017-08-21

## 2017-08-13 RX ADMIN — MORPHINE SULFATE 2 MG: 2 INJECTION, SOLUTION INTRAMUSCULAR; INTRAVENOUS at 13:00

## 2017-08-13 RX ADMIN — PANTOPRAZOLE SODIUM 40 MG: 40 TABLET, DELAYED RELEASE ORAL at 06:20

## 2017-08-13 RX ADMIN — CARVEDILOL 12.5 MG: 12.5 TABLET, FILM COATED ORAL at 08:20

## 2017-08-13 RX ADMIN — NITROGLYCERIN 1 INCH: 20 OINTMENT TOPICAL at 06:21

## 2017-08-13 RX ADMIN — ROPINIROLE 2 MG: 2 TABLET, FILM COATED ORAL at 20:25

## 2017-08-13 RX ADMIN — ATORVASTATIN CALCIUM 40 MG: 20 TABLET, FILM COATED ORAL at 08:20

## 2017-08-13 RX ADMIN — NITROGLYCERIN 1 INCH: 20 OINTMENT TOPICAL at 00:06

## 2017-08-13 RX ADMIN — BUDESONIDE AND FORMOTEROL FUMARATE DIHYDRATE 2 PUFF: 160; 4.5 AEROSOL RESPIRATORY (INHALATION) at 09:18

## 2017-08-13 RX ADMIN — BUDESONIDE AND FORMOTEROL FUMARATE DIHYDRATE 2 PUFF: 160; 4.5 AEROSOL RESPIRATORY (INHALATION) at 19:45

## 2017-08-13 RX ADMIN — CYCLOBENZAPRINE HYDROCHLORIDE 10 MG: 10 TABLET, FILM COATED ORAL at 20:25

## 2017-08-13 RX ADMIN — CARVEDILOL 12.5 MG: 12.5 TABLET, FILM COATED ORAL at 17:12

## 2017-08-13 RX ADMIN — TEMAZEPAM 30 MG: 15 CAPSULE ORAL at 21:29

## 2017-08-13 RX ADMIN — MULTIPLE VITAMINS W/ MINERALS TAB 1 TABLET: TAB at 08:20

## 2017-08-13 RX ADMIN — WARFARIN SODIUM 5 MG: 5 TABLET ORAL at 17:12

## 2017-08-13 RX ADMIN — SERTRALINE 100 MG: 100 TABLET, FILM COATED ORAL at 08:20

## 2017-08-13 RX ADMIN — MORPHINE SULFATE 2 MG: 2 INJECTION, SOLUTION INTRAMUSCULAR; INTRAVENOUS at 21:29

## 2017-08-13 RX ADMIN — LISINOPRIL 10 MG: 10 TABLET ORAL at 08:20

## 2017-08-13 RX ADMIN — CETIRIZINE HYDROCHLORIDE 10 MG: 10 TABLET, FILM COATED ORAL at 08:20

## 2017-08-13 RX ADMIN — FERROUS SULFATE TAB 325 MG (65 MG ELEMENTAL FE) 325 MG: 325 (65 FE) TAB at 17:12

## 2017-08-13 RX ADMIN — FUROSEMIDE 40 MG: 40 TABLET ORAL at 17:12

## 2017-08-13 RX ADMIN — TIOTROPIUM BROMIDE 1 CAPSULE: 18 CAPSULE ORAL; RESPIRATORY (INHALATION) at 09:18

## 2017-08-13 RX ADMIN — HYDROCORTISONE ACETATE 25 MG: 25 SUPPOSITORY RECTAL at 08:20

## 2017-08-13 RX ADMIN — NITROGLYCERIN 1 INCH: 20 OINTMENT TOPICAL at 11:56

## 2017-08-13 RX ADMIN — MORPHINE SULFATE 2 MG: 2 INJECTION, SOLUTION INTRAMUSCULAR; INTRAVENOUS at 17:12

## 2017-08-13 RX ADMIN — MORPHINE SULFATE 2 MG: 2 INJECTION, SOLUTION INTRAMUSCULAR; INTRAVENOUS at 04:58

## 2017-08-13 RX ADMIN — POLYETHYLENE GLYCOL 3350 17 G: 17 POWDER, FOR SOLUTION ORAL at 14:12

## 2017-08-13 RX ADMIN — FUROSEMIDE 40 MG: 40 TABLET ORAL at 08:20

## 2017-08-13 RX ADMIN — NITROGLYCERIN 1 INCH: 20 OINTMENT TOPICAL at 17:12

## 2017-08-13 RX ADMIN — MORPHINE SULFATE 2 MG: 2 INJECTION, SOLUTION INTRAMUSCULAR; INTRAVENOUS at 09:02

## 2017-08-13 NOTE — PLAN OF CARE
Problem: Patient Care Overview (Adult)  Goal: Plan of Care Review  Outcome: Ongoing (interventions implemented as appropriate)    08/13/17 1639   Coping/Psychosocial Response Interventions   Plan Of Care Reviewed With patient   Patient Care Overview   Progress improving       Goal: Adult Individualization and Mutuality  Outcome: Ongoing (interventions implemented as appropriate)    Problem: Fall Risk (Adult)  Goal: Identify Related Risk Factors and Signs and Symptoms  Outcome: Ongoing (interventions implemented as appropriate)    08/11/17 1643   Fall Risk   Fall Risk: Related Risk Factors environment unfamiliar   Fall Risk: Signs and Symptoms presence of risk factors       Goal: Absence of Falls  Outcome: Ongoing (interventions implemented as appropriate)    08/13/17 1639   Fall Risk (Adult)   Absence of Falls making progress toward outcome         Problem: Pain, Acute (Adult)  Goal: Identify Related Risk Factors and Signs and Symptoms  Outcome: Ongoing (interventions implemented as appropriate)    08/11/17 1643   Pain, Acute   Related Risk Factors (Acute Pain) patient perception;persistent pain;procedure/treatment   Signs and Symptoms (Acute Pain) facial mask of pain/grimace;questions meaning of pain;verbalization of pain descriptors       Goal: Acceptable Pain Control/Comfort Level  Outcome: Ongoing (interventions implemented as appropriate)    08/13/17 1639   Pain, Acute (Adult)   Acceptable Pain Control/Comfort Level making progress toward outcome         Problem: GI Endoscopy (Adult)  Goal: Signs and Symptoms of Listed Potential Problems Will be Absent or Manageable (GI Endoscopy)  Outcome: Ongoing (interventions implemented as appropriate)    08/13/17 1639   GI Endoscopy   Problems Assessed (GI Endoscopy) all   Problems Present (GI Endoscopy) pain;situational response

## 2017-08-13 NOTE — PLAN OF CARE
Problem: Patient Care Overview (Adult)  Goal: Plan of Care Review  Outcome: Ongoing (interventions implemented as appropriate)    08/13/17 0300   Coping/Psychosocial Response Interventions   Plan Of Care Reviewed With patient   Patient Care Overview   Progress improving   Outcome Evaluation   Outcome Summary/Follow up Plan VSS, GIB resolved, improved mobility, tolerating 3L O2, GI soft/bland diet. Pain controlled w/IV morphine, restoril for sleep and resting well this PM shift. Continue to monitor.        Goal: Adult Individualization and Mutuality  Outcome: Ongoing (interventions implemented as appropriate)  Goal: Discharge Needs Assessment  Outcome: Ongoing (interventions implemented as appropriate)    08/13/17 0300   Discharge Needs Assessment   Discharge Disposition still a patient         Problem: Fall Risk (Adult)  Goal: Identify Related Risk Factors and Signs and Symptoms  Outcome: Ongoing (interventions implemented as appropriate)    08/11/17 1643   Fall Risk   Fall Risk: Related Risk Factors environment unfamiliar   Fall Risk: Signs and Symptoms presence of risk factors       Goal: Absence of Falls  Outcome: Ongoing (interventions implemented as appropriate)    08/13/17 0300   Fall Risk (Adult)   Absence of Falls making progress toward outcome         Problem: Pain, Acute (Adult)  Goal: Identify Related Risk Factors and Signs and Symptoms  Outcome: Ongoing (interventions implemented as appropriate)    08/11/17 1643   Pain, Acute   Related Risk Factors (Acute Pain) patient perception;persistent pain;procedure/treatment   Signs and Symptoms (Acute Pain) facial mask of pain/grimace;questions meaning of pain;verbalization of pain descriptors       Goal: Acceptable Pain Control/Comfort Level  Outcome: Ongoing (interventions implemented as appropriate)    08/13/17 0300   Pain, Acute (Adult)   Acceptable Pain Control/Comfort Level making progress toward outcome

## 2017-08-13 NOTE — PROGRESS NOTES
"    DAILY PROGRESS NOTE  [facility]    Patient Identification:  Name: Paz Browne  Age: 62 y.o.  Sex: female  :  1955  MRN: [id number]         Primary Care Physician: Javan Martinez MD    Subjective:  Interval History: no new issues - feels weak and tired, soa upon moving, declined PT consult, no n/v/d - no further bleeding     Objective: d/w family member at bedside    Scheduled Meds:    atorvastatin 40 mg Oral Daily   budesonide-formoterol 2 puff Inhalation BID - RT   carvedilol 12.5 mg Oral BID With Meals   cetirizine 10 mg Oral Daily   cyclobenzaprine 10 mg Oral Nightly   furosemide 40 mg Oral BID   hydrocortisone 25 mg Rectal BID   lisinopril 10 mg Oral Daily   multivitamin with minerals 1 tablet Oral Daily   nitroglycerin 1 inch Topical Q6H   pantoprazole 40 mg Oral QAM   rOPINIRole 2 mg Oral Nightly   sertraline 100 mg Oral Daily   tiotropium 1 capsule Inhalation Daily - RT   warfarin 5 mg Oral Daily     Continuous Infusions:     Vital signs in last 24 hours:  Temp:  [97.9 °F (36.6 °C)-99.6 °F (37.6 °C)] 98.2 °F (36.8 °C)  Heart Rate:  [63-73] 67  Resp:  [16-18] 18  BP: (109-132)/(49-88) 132/88    Intake/Output:    Intake/Output Summary (Last 24 hours) at 17 1335  Last data filed at 17 0837   Gross per 24 hour   Intake              570 ml   Output             1500 ml   Net             -930 ml       Exam:  /88 (BP Location: Right arm, Patient Position: Lying)  Pulse 67  Temp 98.2 °F (36.8 °C) (Oral)   Resp 18  Ht 67\" (170.2 cm)  Wt 166 lb (75.3 kg)  SpO2 91%  BMI 26 kg/m2    General Appearance:    Alert, cooperative, no distress, AAOx3                         Throat:   Lips, tongue, gums normal; oral mucosa pink and moist                           Neck:   Supple, no JVD                         Lungs:    Clear to auscultation bilaterally, respirations unlabored                          Heart:    Regular rate and rhythm, S1 and S2 normal                  " Abdomen:     Soft, non-tender, bowel sounds active                 Extremities:   Extremities normal, atraumatic, no cyanosis or edema                         Data Review:  Labs in chart were reviewed.    Assessment:  Active Hospital Problems (** Indicates Principal Problem)    Diagnosis Date Noted   • History of mitral valve replacement with tissue graft [Z95.4] 08/11/2017   • Precordial pain [R07.2] 08/11/2017   • Lower GI bleed [K92.2] 08/09/2017   • Long term current use of anticoagulant [Z79.01] 10/13/2016   • Pulmonary hypertension [I27.2]    • Hypertension [I10]    • COPD (chronic obstructive pulmonary disease) [J44.9]    • PAF (paroxysmal atrial fibrillation) [I48.0] 01/25/2016      Resolved Hospital Problems    Diagnosis Date Noted Date Resolved   No resolved problems to display.       Plan:  Cscope w/ mucosa abnormality that was clipped w/ bleeding internal hemorrhoids                        -Restarted AC and Cards plans to not resume bridge so restarted at 5mg   on 8/11/17 and no return of bleeding as of 8/13                        -Hgb stable though did drop 10.6-9.0-9.3-8.9-9.3 and appears to be stabilizing             -watch until has nonbloody BM, pt and family extremely nervous about leaving   before having a normal BM    -add miralax      PAF/MVR/TV w/ CP - ekg unchanged and improved troponins - Cards still has on nitro       Severe Pulm HTN - stable       COPD with chronic respiratory failure: on 3L which his home dose, stable    HTN - ok      DIPSO: home when has non-bloody BM    Fam Sanderson MD  8/13/2017  1:35 PM

## 2017-08-13 NOTE — PROGRESS NOTES
[facility]  Inpatient Progress Note    Reason for Follow Up:  Rectal bleeding    Subjective     Interval History:   No more rectal bleeding. C/o abdominal discomfort. We reviewed her CT abd/pelvis done 8/9/17. Her Hgb is slightly higher but she looks iron deficient.    Current Facility-Administered Medications:   •  acetaminophen (TYLENOL) tablet 650 mg, 650 mg, Oral, Q6H PRN, Tata Newby MD  •  albuterol (PROVENTIL) nebulizer solution 0.083% 2.5 mg/3mL, 2.5 mg, Nebulization, BID PRN, Tata Newby MD  •  atorvastatin (LIPITOR) tablet 40 mg, 40 mg, Oral, Daily, Tata Newby MD, 40 mg at 08/13/17 0820  •  bisacodyl (DULCOLAX) suppository 10 mg, 10 mg, Rectal, Daily PRN, Tata Newby MD  •  budesonide-formoterol (SYMBICORT) 160-4.5 MCG/ACT inhaler 2 puff, 2 puff, Inhalation, BID - RT, Tata Newby MD, 2 puff at 08/13/17 0918  •  calcium carbonate (TUMS) chewable tablet 500 mg (200 mg elemental), 1 tablet, Oral, BID PRN, Tata Newby MD  •  carvedilol (COREG) tablet 12.5 mg, 12.5 mg, Oral, BID With Meals, Kirti Arteaga MD, 12.5 mg at 08/13/17 0820  •  cetirizine (zyrTEC) tablet 10 mg, 10 mg, Oral, Daily, Tata Newby MD, 10 mg at 08/13/17 0820  •  cyclobenzaprine (FLEXERIL) tablet 10 mg, 10 mg, Oral, Nightly, Tata Newby MD, 10 mg at 08/12/17 2023  •  ferrous sulfate tablet 325 mg, 325 mg, Oral, BID With Meals, Cleveland Devine MD  •  furosemide (LASIX) tablet 40 mg, 40 mg, Oral, BID, Leonel Ruiz MD, 40 mg at 08/13/17 0820  •  HYDROcodone-acetaminophen (NORCO) 5-325 MG per tablet 1 tablet, 1 tablet, Oral, Q4H PRN, Tata Newby MD, 1 tablet at 08/11/17 0955  •  hydrocortisone (ANUSOL-HC) suppository 25 mg, 25 mg, Rectal, BID, Earnest PALOMO MD, 25 mg at 08/13/17 0820  •  hyoscyamine (LEVSIN) SL tablet 125 mcg, 125 mcg, Sublingual, Q4H PRN, BA Vaca, 125 mcg at 08/11/17  1107  •  lisinopril (PRINIVIL,ZESTRIL) tablet 10 mg, 10 mg, Oral, Daily, Kirti Arteaga MD, 10 mg at 08/13/17 0820  •  magnesium hydroxide (MILK OF MAGNESIA) suspension 2400 mg/10mL 10 mL, 10 mL, Oral, Daily PRN, Tata Newby MD  •  meclizine (ANTIVERT) tablet 25 mg, 25 mg, Oral, TID PRN, Tata Newby MD  •  Morphine sulfate (PF) injection 2 mg, 2 mg, Intravenous, Q4H PRN, 2 mg at 08/13/17 1300 **AND** naloxone (NARCAN) injection 0.4 mg, 0.4 mg, Intravenous, Q5 Min PRN, Tata Newby MD  •  multivitamin with minerals 1 tablet, 1 tablet, Oral, Daily, Tata Newby MD, 1 tablet at 08/13/17 0820  •  nitroglycerin (NITROSTAT) ointment 1 inch, 1 inch, Topical, Q6H, Kirti Arteaga MD, 1 inch at 08/13/17 1156  •  nitroglycerin (NITROSTAT) SL tablet 0.4 mg, 0.4 mg, Sublingual, Q5 Min PRN, Tata Newby MD  •  ondansetron (ZOFRAN) injection 4 mg, 4 mg, Intravenous, Q6H PRN, Tata Newby MD  •  pantoprazole (PROTONIX) EC tablet 40 mg, 40 mg, Oral, QAM, Tata Newby MD, 40 mg at 08/13/17 0620  •  polyethylene glycol (MIRALAX) powder 17 g, 17 g, Oral, Daily, Fam Sanderson MD, 17 g at 08/13/17 1412  •  rOPINIRole (REQUIP) tablet 2 mg, 2 mg, Oral, Nightly, Tata Newby MD, 2 mg at 08/12/17 2023  •  sertraline (ZOLOFT) tablet 100 mg, 100 mg, Oral, Daily, Tata Newby MD, 100 mg at 08/13/17 0820  •  sodium chloride (OCEAN) nasal spray 2 spray, 2 spray, Each Nare, PRN, Leonel Ruiz MD  •  sodium chloride 0.9 % flush 1-10 mL, 1-10 mL, Intravenous, PRN, Tata Newby MD  •  sodium chloride 0.9 % flush 10 mL, 10 mL, Intravenous, PRN, Amrit Reinoso MD  •  temazepam (RESTORIL) capsule 30 mg, 30 mg, Oral, Nightly PRN, Tata Newby MD, 30 mg at 08/12/17 2146  •  tiotropium (SPIRIVA) 18 MCG per inhalation capsule 1 capsule, 1 capsule, Inhalation, Daily - RT, Tata Newby MD, 1 capsule at 08/13/17  0918  •  warfarin (COUMADIN) tablet 5 mg, 5 mg, Oral, Daily, Leonel Ruiz MD, 5 mg at 08/12/17 2577  Review of Systems:    The following systems were reviewed and negative;  respiratory, cardiovascular, musculoskeletal and neurological    Objective     Vital Signs  Temp:  [97.9 °F (36.6 °C)-99.6 °F (37.6 °C)] 98.6 °F (37 °C)  Heart Rate:  [67-73] 71  Resp:  [16-18] 18  BP: (109-132)/(49-88) 120/58  Body mass index is 26 kg/(m^2).    Intake/Output Summary (Last 24 hours) at 08/13/17 1600  Last data filed at 08/13/17 0837   Gross per 24 hour   Intake              570 ml   Output             1500 ml   Net             -930 ml     I/O this shift:  In: 210 [P.O.:210]  Out: -      Physical Exam:   General: patient awake, alert and cooperative   Eyes: Normal lids and lashes, no scleral icterus   Neck: supple, normal ROM   Skin: warm and dry, not jaundiced   Cardiovascular: regular rhythm and rate, no murmurs auscultated   Pulm: clear to auscultation bilaterally, regular and unlabored   Abdomen: soft, mild difffuse tenderness, nondistended; normal bowel sounds   Rectal: deferred   Extremities: no rash or edema   Psychiatric: Normal mood and behavior; memory intact     Results Review:     I reviewed the patient's new clinical results.      Results from last 7 days  Lab Units 08/13/17  0449 08/12/17  0410 08/11/17  1540 08/11/17  0601   WBC 10*3/mm3 6.89 7.53  --  5.37   HEMOGLOBIN g/dL 9.3* 8.9* 9.3* 9.0*   HEMATOCRIT % 30.3* 28.9* 29.8* 29.6*   PLATELETS 10*3/mm3 187 189  --  181       Results from last 7 days  Lab Units 08/13/17  0449 08/12/17  0410 08/11/17  0601  08/09/17  1206   SODIUM mmol/L 141 140 141  < > 138   POTASSIUM mmol/L 4.0 4.5 4.1  < > 4.0   CHLORIDE mmol/L 99 100 102  < > 94*   CO2 mmol/L 28.2 28.1 30.1*  < > 30.7*   BUN mg/dL 23 17 13  < > 14   CREATININE mg/dL 1.27* 1.21* 0.88  < > 1.16*   CALCIUM mg/dL 9.6 9.2 9.0  < > 10.9*   BILIRUBIN mg/dL 0.5  --   --   --  0.6   ALK PHOS U/L 77  --   --   --   93   ALT (SGPT) U/L 24  --   --   --  27   AST (SGOT) U/L 20  --   --   --  30   GLUCOSE mg/dL 105* 109* 106*  < > 96   < > = values in this interval not displayed.    Results from last 7 days  Lab Units 08/12/17  0410 08/10/17  0548 08/09/17  1207   INR  1.22* 1.50* 1.58*       Lab Results  Lab Value Date/Time   LIPASE 46 08/13/2017 0449       Radiology:  XR Chest PA & Lateral   Final Result      CT Abdomen Pelvis Without Contrast   Final Result   1. No evidence for acute intra-abdominal process.   2. Basilar pulmonary emphysema.   3. Hiatal hernia. Atherosclerotic disease. Previous cholecystectomy and   hysterectomy.       This report was finalized on 8/9/2017 3:26 PM by Dr. Magdi Greer MD.          FL Upper GI With Air Contrast & SBFT    (Results Pending)       Assessment/Plan     Patient Active Problem List   Diagnosis   • PAF (paroxysmal atrial fibrillation)   • Hypertension   • Hyperlipidemia   • COPD (chronic obstructive pulmonary disease)   • Pain medication agreement   • Hiatal hernia   • Primary osteoarthritis involving multiple joints   • Pulmonary hypertension   • S/P TVR (tricuspid valve repair)   • Long term current use of anticoagulant   • Pulmonary nodule   • Hemoptysis   • RLS (restless legs syndrome)   • Chronic low back pain   • Dysplastic polyp of colon   • Lower GI bleed   • History of mitral valve replacement with tissue graft   • Precordial pain   • Chronic respiratory failure with hypoxia       I discussed the patients findings and my recommendations with patient and family.    Assessment/Recommendations:  1) NO more rectal bleeding.  2) Iron deficiency anemia - I will start iron pills.  3) Abdominal bloating discomfort. I will get an UGI with small bowel follow through.  4) Anal pain from hemorrhoids - Continue Anusol HC Suppositories BID    Cleveland Devine MD

## 2017-08-14 ENCOUNTER — APPOINTMENT (OUTPATIENT)
Dept: GENERAL RADIOLOGY | Facility: HOSPITAL | Age: 62
End: 2017-08-14
Attending: INTERNAL MEDICINE

## 2017-08-14 PROBLEM — B37.0 ORAL CANDIDIASIS: Status: ACTIVE | Noted: 2017-08-14

## 2017-08-14 LAB
BASOPHILS # BLD AUTO: 0.02 10*3/MM3 (ref 0–0.2)
BASOPHILS NFR BLD AUTO: 0.2 % (ref 0–1.5)
DEPRECATED RDW RBC AUTO: 47.2 FL (ref 37–54)
EOSINOPHIL # BLD AUTO: 0.19 10*3/MM3 (ref 0–0.7)
EOSINOPHIL NFR BLD AUTO: 2.4 % (ref 0.3–6.2)
ERYTHROCYTE [DISTWIDTH] IN BLOOD BY AUTOMATED COUNT: 14.6 % (ref 11.7–13)
HCT VFR BLD AUTO: 27.4 % (ref 35.6–45.5)
HGB BLD-MCNC: 8.6 G/DL (ref 11.9–15.5)
IMM GRANULOCYTES # BLD: 0 10*3/MM3 (ref 0–0.03)
IMM GRANULOCYTES NFR BLD: 0 % (ref 0–0.5)
INR PPP: 1.94 (ref 0.9–1.1)
LYMPHOCYTES # BLD AUTO: 1.39 10*3/MM3 (ref 0.9–4.8)
LYMPHOCYTES NFR BLD AUTO: 17.2 % (ref 19.6–45.3)
MCH RBC QN AUTO: 28 PG (ref 26.9–32)
MCHC RBC AUTO-ENTMCNC: 31.4 G/DL (ref 32.4–36.3)
MCV RBC AUTO: 89.3 FL (ref 80.5–98.2)
MONOCYTES # BLD AUTO: 0.56 10*3/MM3 (ref 0.2–1.2)
MONOCYTES NFR BLD AUTO: 6.9 % (ref 5–12)
NEUTROPHILS # BLD AUTO: 5.9 10*3/MM3 (ref 1.9–8.1)
NEUTROPHILS NFR BLD AUTO: 73.3 % (ref 42.7–76)
PLATELET # BLD AUTO: 197 10*3/MM3 (ref 140–500)
PMV BLD AUTO: 10.9 FL (ref 6–12)
PROTHROMBIN TIME: 21.5 SECONDS (ref 11.7–14.2)
RBC # BLD AUTO: 3.07 10*6/MM3 (ref 3.9–5.2)
WBC NRBC COR # BLD: 8.06 10*3/MM3 (ref 4.5–10.7)

## 2017-08-14 PROCEDURE — 25010000002 MORPHINE SULFATE (PF) 2 MG/ML SOLUTION: Performed by: INTERNAL MEDICINE

## 2017-08-14 PROCEDURE — 85610 PROTHROMBIN TIME: CPT | Performed by: INTERNAL MEDICINE

## 2017-08-14 PROCEDURE — 85025 COMPLETE CBC W/AUTO DIFF WBC: CPT | Performed by: INTERNAL MEDICINE

## 2017-08-14 PROCEDURE — 74249: CPT

## 2017-08-14 PROCEDURE — 99233 SBSQ HOSP IP/OBS HIGH 50: CPT | Performed by: INTERNAL MEDICINE

## 2017-08-14 PROCEDURE — 99232 SBSQ HOSP IP/OBS MODERATE 35: CPT | Performed by: INTERNAL MEDICINE

## 2017-08-14 PROCEDURE — 94799 UNLISTED PULMONARY SVC/PX: CPT

## 2017-08-14 RX ORDER — WARFARIN SODIUM 3 MG/1
3 TABLET ORAL
Status: DISCONTINUED | OUTPATIENT
Start: 2017-08-14 | End: 2017-08-15

## 2017-08-14 RX ADMIN — MORPHINE SULFATE 2 MG: 2 INJECTION, SOLUTION INTRAMUSCULAR; INTRAVENOUS at 10:55

## 2017-08-14 RX ADMIN — MORPHINE SULFATE 2 MG: 2 INJECTION, SOLUTION INTRAMUSCULAR; INTRAVENOUS at 19:53

## 2017-08-14 RX ADMIN — CYCLOBENZAPRINE HYDROCHLORIDE 10 MG: 10 TABLET, FILM COATED ORAL at 20:11

## 2017-08-14 RX ADMIN — MORPHINE SULFATE 2 MG: 2 INJECTION, SOLUTION INTRAMUSCULAR; INTRAVENOUS at 15:25

## 2017-08-14 RX ADMIN — CARVEDILOL 12.5 MG: 12.5 TABLET, FILM COATED ORAL at 08:47

## 2017-08-14 RX ADMIN — MORPHINE SULFATE 2 MG: 2 INJECTION, SOLUTION INTRAMUSCULAR; INTRAVENOUS at 03:11

## 2017-08-14 RX ADMIN — POLYETHYLENE GLYCOL 3350 17 G: 17 POWDER, FOR SOLUTION ORAL at 17:49

## 2017-08-14 RX ADMIN — MULTIPLE VITAMINS W/ MINERALS TAB 1 TABLET: TAB at 15:29

## 2017-08-14 RX ADMIN — NYSTATIN 500000 UNITS: 100000 SUSPENSION ORAL at 17:49

## 2017-08-14 RX ADMIN — NITROGLYCERIN 1 INCH: 20 OINTMENT TOPICAL at 07:05

## 2017-08-14 RX ADMIN — FERROUS SULFATE TAB 325 MG (65 MG ELEMENTAL FE) 325 MG: 325 (65 FE) TAB at 15:29

## 2017-08-14 RX ADMIN — FUROSEMIDE 40 MG: 40 TABLET ORAL at 15:29

## 2017-08-14 RX ADMIN — TIOTROPIUM BROMIDE 1 CAPSULE: 18 CAPSULE ORAL; RESPIRATORY (INHALATION) at 07:14

## 2017-08-14 RX ADMIN — ALBUTEROL SULFATE 2.5 MG: 2.5 SOLUTION RESPIRATORY (INHALATION) at 19:18

## 2017-08-14 RX ADMIN — ATORVASTATIN CALCIUM 40 MG: 20 TABLET, FILM COATED ORAL at 15:29

## 2017-08-14 RX ADMIN — MORPHINE SULFATE 2 MG: 2 INJECTION, SOLUTION INTRAMUSCULAR; INTRAVENOUS at 07:05

## 2017-08-14 RX ADMIN — SERTRALINE 100 MG: 100 TABLET, FILM COATED ORAL at 15:29

## 2017-08-14 RX ADMIN — LISINOPRIL 10 MG: 10 TABLET ORAL at 15:29

## 2017-08-14 RX ADMIN — NYSTATIN 500000 UNITS: 100000 SUSPENSION ORAL at 20:12

## 2017-08-14 RX ADMIN — TEMAZEPAM 30 MG: 15 CAPSULE ORAL at 22:45

## 2017-08-14 RX ADMIN — PANTOPRAZOLE SODIUM 40 MG: 40 TABLET, DELAYED RELEASE ORAL at 07:05

## 2017-08-14 RX ADMIN — FERROUS SULFATE TAB 325 MG (65 MG ELEMENTAL FE) 325 MG: 325 (65 FE) TAB at 17:50

## 2017-08-14 RX ADMIN — WARFARIN SODIUM 3 MG: 3 TABLET ORAL at 17:49

## 2017-08-14 RX ADMIN — CARVEDILOL 12.5 MG: 12.5 TABLET, FILM COATED ORAL at 17:50

## 2017-08-14 RX ADMIN — BUDESONIDE AND FORMOTEROL FUMARATE DIHYDRATE 2 PUFF: 160; 4.5 AEROSOL RESPIRATORY (INHALATION) at 07:13

## 2017-08-14 RX ADMIN — FUROSEMIDE 40 MG: 40 TABLET ORAL at 20:12

## 2017-08-14 RX ADMIN — CETIRIZINE HYDROCHLORIDE 10 MG: 10 TABLET, FILM COATED ORAL at 15:29

## 2017-08-14 RX ADMIN — NITROGLYCERIN 1 INCH: 20 OINTMENT TOPICAL at 17:49

## 2017-08-14 RX ADMIN — HYDROCORTISONE ACETATE 25 MG: 25 SUPPOSITORY RECTAL at 18:05

## 2017-08-14 RX ADMIN — ROPINIROLE 2 MG: 2 TABLET, FILM COATED ORAL at 20:11

## 2017-08-14 NOTE — PROGRESS NOTES
[facility]/GI Progress Note   Chief Complaint:  Rectal bleeding, abd pain/bloating    Subjective     Interval History: No further bleeding, per RN with BM yesterday had streak of blood but otherwise has been negative.  C/O abd pain/cramping and bloating.  SBFT to be done this am.  No n/v reported.  Hgb slightly decreased today.  Cards note reviewed, back on coumadin but dose decreased, is high risk for embolic event off AC.    History taken from: patient chart RN    Review of Systems:    All systems were reviewed and negative except for:  Gastrointestinal: postitive for  pain and abd cramping and bloating    Objective     Vital Signs  Temp:  [98 °F (36.7 °C)-99 °F (37.2 °C)] 99 °F (37.2 °C)  Heart Rate:  [62-71] 64  Resp:  [12-20] 12  BP: ()/(56-88) 122/80  Body mass index is 26 kg/(m^2).    Intake/Output Summary (Last 24 hours) at 08/14/17 1135  Last data filed at 08/14/17 0656   Gross per 24 hour   Intake                0 ml   Output              200 ml   Net             -200 ml          Physical Exam:   General: patient awake, alert and cooperative   Eyes: Normal lids and lashes, no scleral icterus   Neck: supple, normal ROM, no tracheal deviation   Skin: warm and dry, not jaundiced   Cardiovascular: regular rhythm and rate, no murmurs auscultated   Pulm: clear to auscultation bilaterally, regular and unlabored   Abdomen: obese, round, soft, lower abdomen tender, nondistended; normal bowel sounds   Rectal: deferred   Extremities: no rash or edema   Neurologic: Normal mood and behavior    All Medications Have Been Reviewed     Results Review:       Results from last 7 days  Lab Units 08/14/17  0334 08/13/17  0449 08/12/17  0410   WBC 10*3/mm3 8.06 6.89 7.53   HEMOGLOBIN g/dL 8.6* 9.3* 8.9*   HEMATOCRIT % 27.4* 30.3* 28.9*   PLATELETS 10*3/mm3 197 187 189         Results from last 7 days  Lab Units 08/13/17  0449 08/12/17  0410 08/11/17  0601  08/09/17  1206   SODIUM mmol/L 141 140 141  < > 138   POTASSIUM  mmol/L 4.0 4.5 4.1  < > 4.0   CHLORIDE mmol/L 99 100 102  < > 94*   CO2 mmol/L 28.2 28.1 30.1*  < > 30.7*   BUN mg/dL 23 17 13  < > 14   CREATININE mg/dL 1.27* 1.21* 0.88  < > 1.16*   CALCIUM mg/dL 9.6 9.2 9.0  < > 10.9*   BILIRUBIN mg/dL 0.5  --   --   --  0.6   ALK PHOS U/L 77  --   --   --  93   ALT (SGPT) U/L 24  --   --   --  27   AST (SGOT) U/L 20  --   --   --  30   GLUCOSE mg/dL 105* 109* 106*  < > 96   < > = values in this interval not displayed.      Results from last 7 days  Lab Units 08/14/17  0334 08/12/17  0410 08/10/17  0548   INR  1.94* 1.22* 1.50*       RADIOLOGY:    Imaging Results (last 72 hours)     ** No results found for the last 72 hours. **          Assessment/Plan     Patient Active Problem List   Diagnosis Code   • PAF (paroxysmal atrial fibrillation) I48.0   • Hypertension I10   • Hyperlipidemia E78.5   • COPD (chronic obstructive pulmonary disease) J44.9   • Pain medication agreement Z79.899   • Hiatal hernia K44.9   • Primary osteoarthritis involving multiple joints M15.0   • Pulmonary hypertension I27.2   • S/P TVR (tricuspid valve repair) Z98.890   • Long term current use of anticoagulant Z79.01   • Pulmonary nodule R91.1   • Hemoptysis R04.2   • RLS (restless legs syndrome) G25.81   • Chronic low back pain M54.5, G89.29   • Dysplastic polyp of colon K63.5   • Lower GI bleed K92.2   • History of mitral valve replacement with tissue graft Z95.4   • Precordial pain R07.2   • Chronic respiratory failure with hypoxia J96.11     Edel Bernabe, APRN  08/14/17  11:35 AM    Impression/Recommendations:  #1 Lower GI bleeding: resolved. Onset in the postprocedural setting s/p colonoscopy with bleeding internal hemorrhoids. Congested mucosa in transverse colon and clips placed on 8/4/17  #2 anticoagulant therapy: Currently on Coumadin  #3 Abdominal bloating- SBFT pending  #4 ANNA- oral iron therapy initiated    - f/u on SBFT          Rick Morales M.D.  [facility]  [facility] [place] [address]  Office: [phone]

## 2017-08-14 NOTE — PROGRESS NOTES
"    DAILY PROGRESS NOTE  [facility]    Patient Identification:  Name: Paz Browne  Age: 62 y.o.  Sex: female  :  1955  MRN: [id number]         Primary Care Physician: Javan Martinez MD    Subjective:  Interval History: woke up with sore throat today, no BM but flatus, some small amount of blood on TP, no n/v/d - no further bleeding     Objective: d/w family member at bedside    Scheduled Meds:    atorvastatin 40 mg Oral Daily   budesonide-formoterol 2 puff Inhalation BID - RT   carvedilol 12.5 mg Oral BID With Meals   cetirizine 10 mg Oral Daily   cyclobenzaprine 10 mg Oral Nightly   ferrous sulfate 325 mg Oral BID With Meals   furosemide 40 mg Oral BID   hydrocortisone 25 mg Rectal BID   lisinopril 10 mg Oral Daily   multivitamin with minerals 1 tablet Oral Daily   nitroglycerin 1 inch Topical Q6H   nystatin 5 mL Oral 4x Daily   pantoprazole 40 mg Oral QAM   polyethylene glycol 17 g Oral Daily   rOPINIRole 2 mg Oral Nightly   sertraline 100 mg Oral Daily   tiotropium 1 capsule Inhalation Daily - RT   warfarin 3 mg Oral Daily     Continuous Infusions:     Vital signs in last 24 hours:  Temp:  [98 °F (36.7 °C)-99 °F (37.2 °C)] 99 °F (37.2 °C)  Heart Rate:  [60-70] 70  Resp:  [12-20] 12  BP: ()/(56-80) 127/62    Intake/Output:    Intake/Output Summary (Last 24 hours) at 17 1554  Last data filed at 17 1500   Gross per 24 hour   Intake                0 ml   Output              350 ml   Net             -350 ml       Exam:  /62 (BP Location: Right arm, Patient Position: Lying)  Pulse 70  Temp 99 °F (37.2 °C) (Oral)   Resp 12  Ht 67\" (170.2 cm)  Wt 166 lb (75.3 kg)  SpO2 95%  BMI 26 kg/m2    General Appearance:    Alert, cooperative, no distress, AAOx3                         Throat:   Lips, tongue, gums normal; oral mucosa pink and moist, posterior   oropharynx with oral candidiasis                           Neck:   Supple, no JVD                         Lungs: "    Clear to auscultation bilaterally, respirations unlabored                          Heart:    Regular rate and rhythm, S1 and S2 normal                  Abdomen:     Soft, non-tender, bowel sounds active                 Extremities:   Extremities normal, atraumatic, no cyanosis or edema                         Data Review:  Labs in chart were reviewed.    Assessment:  Active Hospital Problems (** Indicates Principal Problem)    Diagnosis Date Noted   • Chronic respiratory failure with hypoxia [J96.11] 08/13/2017   • History of mitral valve replacement with tissue graft [Z95.4] 08/11/2017   • Precordial pain [R07.2] 08/11/2017   • Lower GI bleed [K92.2] 08/09/2017   • Long term current use of anticoagulant [Z79.01] 10/13/2016   • Pulmonary hypertension [I27.2]    • Hypertension [I10]    • COPD (chronic obstructive pulmonary disease) [J44.9]    • PAF (paroxysmal atrial fibrillation) [I48.0] 01/25/2016      Resolved Hospital Problems    Diagnosis Date Noted Date Resolved   No resolved problems to display.       Plan:  Oral candidiasis  -due to symbicort  -nystatin swish and swallow added today    Cscope w/ mucosa abnormality that was clipped w/ bleeding internal hemorrhoids    -Restarted AC and Cards plans to not bridge so restarted at 5mg  on 8/11/17 and no return of bleeding as of 8/13  -Hgb keeps dropping despite not bleeding 10.6-9.0-9.3-8.9-9.3-8.6   -added miralax  SBFT ordered, follow results      PAF/MVR/TV w/ CP - ekg unchanged and improved troponins - warfarin dose decreased per cardiology, if hgb continues to drop she may need to be off warfarin      Severe Pulm HTN - stable       COPD with chronic respiratory failure: on 3L which his home dose, stable    HTN - ok      DIPSO: home soon    Fam Sanderson MD  8/14/2017  3:54 PM

## 2017-08-14 NOTE — PLAN OF CARE
Problem: Patient Care Overview (Adult)  Goal: Plan of Care Review  Outcome: Ongoing (interventions implemented as appropriate)  Goal: Adult Individualization and Mutuality  Outcome: Ongoing (interventions implemented as appropriate)  Goal: Discharge Needs Assessment  Outcome: Ongoing (interventions implemented as appropriate)    08/14/17 0429   Discharge Needs Assessment   Discharge Disposition still a patient         Problem: Fall Risk (Adult)  Goal: Identify Related Risk Factors and Signs and Symptoms  Outcome: Ongoing (interventions implemented as appropriate)    08/11/17 1643   Fall Risk   Fall Risk: Related Risk Factors environment unfamiliar   Fall Risk: Signs and Symptoms presence of risk factors       Goal: Absence of Falls  Outcome: Ongoing (interventions implemented as appropriate)    08/14/17 0429   Fall Risk (Adult)   Absence of Falls making progress toward outcome         Problem: Pain, Acute (Adult)  Goal: Identify Related Risk Factors and Signs and Symptoms  Outcome: Ongoing (interventions implemented as appropriate)    08/11/17 1643   Pain, Acute   Related Risk Factors (Acute Pain) patient perception;persistent pain;procedure/treatment   Signs and Symptoms (Acute Pain) facial mask of pain/grimace;questions meaning of pain;verbalization of pain descriptors       Goal: Acceptable Pain Control/Comfort Level  Outcome: Ongoing (interventions implemented as appropriate)    08/14/17 0429   Pain, Acute (Adult)   Acceptable Pain Control/Comfort Level making progress toward outcome

## 2017-08-14 NOTE — PROGRESS NOTES
Continued Stay Note  [facility]     Patient Name: Paz Browne  MRN: [id number]  Today's Date: 8/14/2017    Admit Date: 8/9/2017          Discharge Plan       08/14/17 4650    Case Management/Social Work Plan    Plan Home    Patient/Family In Agreement With Plan yes    Additional Comments Spoke with pt and family at bedside.  Pt states she has no needs and does not want to work with therapy.  Pt and family agree that she has no needs at home and family will assist.  BC Meza RN              Discharge Codes     None            Beverly Meza

## 2017-08-14 NOTE — PROGRESS NOTES
"CC: Afib/MVR    Interval History:   Feels ok would like to go home    Vital Signs  Temp:  [98 °F (36.7 °C)-99 °F (37.2 °C)] 99 °F (37.2 °C)  Heart Rate:  [62-72] 64  Resp:  [12-20] 12  BP: ()/(56-88) 122/80    Intake/Output Summary (Last 24 hours) at 08/14/17 0736  Last data filed at 08/14/17 0656   Gross per 24 hour   Intake              210 ml   Output              200 ml   Net               10 ml     Flowsheet Rows         First Filed Value    Admission Height  67\" (170.2 cm) Documented at 08/09/2017 1152    Admission Weight  166 lb (75.3 kg) Documented at 08/09/2017 1152          PHYSICAL EXAM:  General: No acute distress  Resp:NL Rate, unlabored,decreased sounds throughout  CV:NL rate and rhythm, NL PMI, Nl S1 and S2, no Mumur, no gallop, no rub, No JVD. Normal pedal pulses  ABD:Nl sounds, no masses or tenderness, nondistended, no quarding or rebound  Neuro: alert,cooperative and oriented  Extr: No edema or cyanosis, moves all extremities      Results Review:      Results from last 7 days  Lab Units 08/13/17  0449   SODIUM mmol/L 141   POTASSIUM mmol/L 4.0   CHLORIDE mmol/L 99   CO2 mmol/L 28.2   BUN mg/dL 23   CREATININE mg/dL 1.27*   GLUCOSE mg/dL 105*   CALCIUM mg/dL 9.6       Results from last 7 days  Lab Units 08/10/17  0548 08/09/17  1810 08/09/17  1206   TROPONIN T ng/mL <0.010 <0.010 <0.010       Results from last 7 days  Lab Units 08/14/17  0334   WBC 10*3/mm3 8.06   HEMOGLOBIN g/dL 8.6*   HEMATOCRIT % 27.4*   PLATELETS 10*3/mm3 197       Results from last 7 days  Lab Units 08/14/17  0334 08/12/17  0410 08/10/17  0548   INR  1.94* 1.22* 1.50*       Results from last 7 days  Lab Units 08/10/17  0548   CHOLESTEROL mg/dL 163       Results from last 7 days  Lab Units 08/10/17  0548   MAGNESIUM mg/dL 2.0       Results from last 7 days  Lab Units 08/10/17  0548   CHOLESTEROL mg/dL 163   TRIGLYCERIDES mg/dL 129   HDL CHOL mg/dL 58     @LABRCNT(bnp)@  I reviewed the patient's new clinical results.  I " personally viewed and interpreted the patient's EKG/Telemetry data        Medication Review:   Meds reviewed         Assessment/Plan  1.  Lower GI bleed.  Status post polypectomy.  Concerned her hemoglobin is dropped some today we'll decrease her warfarin dose.  May have to maintain her off warfarin.  2.  Atrial fibrillation/flutter.  Status post atrial appendage closure and cryo-maze procedure no documented recurrence since her cardioversion in 2014.  If needed to try her off warfarin although she is high risk for embolic event.  3.  History of severe mitral and tricuspid insufficiency status post mitral replacement with tissue valve and tricuspid valve repair.  4.  Marked pulmonary hypertension.  Not a transplant candidate follows with pulmonary.  5.  History of hypertension blood pressure adequately controlled.  6.  Renal insufficiency stable.  8.  Hyperlipidemia.        Earnest Gan MD  08/14/17  7:36 AM

## 2017-08-14 NOTE — PLAN OF CARE
Problem: Patient Care Overview (Adult)  Goal: Plan of Care Review  Outcome: Ongoing (interventions implemented as appropriate)    08/14/17 1611   Coping/Psychosocial Response Interventions   Plan Of Care Reviewed With patient   Patient Care Overview   Progress improving   Outcome Evaluation   Outcome Summary/Follow up Plan vss, no noted bleeding, UGI with SBF today results pending, morphine 2mg iv given for abd pain, controlling pain. c/o throat discomfort nystatin started for thrush.        Goal: Adult Individualization and Mutuality  Outcome: Ongoing (interventions implemented as appropriate)    Problem: Fall Risk (Adult)  Goal: Identify Related Risk Factors and Signs and Symptoms  Outcome: Ongoing (interventions implemented as appropriate)    08/14/17 1611   Fall Risk   Fall Risk: Related Risk Factors environment unfamiliar   Fall Risk: Signs and Symptoms presence of risk factors       Goal: Absence of Falls  Outcome: Ongoing (interventions implemented as appropriate)    08/14/17 1611   Fall Risk (Adult)   Absence of Falls making progress toward outcome         Problem: Pain, Acute (Adult)  Goal: Identify Related Risk Factors and Signs and Symptoms  Outcome: Ongoing (interventions implemented as appropriate)    08/14/17 1611   Pain, Acute   Related Risk Factors (Acute Pain) patient perception;persistent pain;procedure/treatment   Signs and Symptoms (Acute Pain) facial mask of pain/grimace;guarding/abnormal posturing/positioning;verbalization of pain descriptors       Goal: Acceptable Pain Control/Comfort Level  Outcome: Ongoing (interventions implemented as appropriate)    08/14/17 1611   Pain, Acute (Adult)   Acceptable Pain Control/Comfort Level making progress toward outcome

## 2017-08-15 PROBLEM — N17.9 AKI (ACUTE KIDNEY INJURY): Status: ACTIVE | Noted: 2017-08-15

## 2017-08-15 LAB
ABO GROUP BLD: NORMAL
AMORPH URATE CRY URNS QL MICRO: NORMAL /HPF
ANION GAP SERPL CALCULATED.3IONS-SCNC: 15.4 MMOL/L
BACTERIA UR QL AUTO: NORMAL /HPF
BASOPHILS # BLD AUTO: 0.02 10*3/MM3 (ref 0–0.2)
BASOPHILS NFR BLD AUTO: 0.3 % (ref 0–1.5)
BILIRUB UR QL STRIP: NEGATIVE
BLD GP AB SCN SERPL QL: NEGATIVE
BUN BLD-MCNC: 38 MG/DL (ref 8–23)
BUN/CREAT SERPL: 17.7 (ref 7–25)
CALCIUM SPEC-SCNC: 8.6 MG/DL (ref 8.6–10.5)
CHLORIDE SERPL-SCNC: 92 MMOL/L (ref 98–107)
CLARITY UR: CLEAR
CO2 SERPL-SCNC: 27.6 MMOL/L (ref 22–29)
COLOR UR: YELLOW
CREAT BLD-MCNC: 2.15 MG/DL (ref 0.57–1)
DEPRECATED RDW RBC AUTO: 46.4 FL (ref 37–54)
EOSINOPHIL # BLD AUTO: 0.19 10*3/MM3 (ref 0–0.7)
EOSINOPHIL NFR BLD AUTO: 2.6 % (ref 0.3–6.2)
ERYTHROCYTE [DISTWIDTH] IN BLOOD BY AUTOMATED COUNT: 14.4 % (ref 11.7–13)
FOLATE BLD-MCNC: 545.7 NG/ML
FOLATE RBC-MCNC: 1935 NG/ML
GFR SERPL CREATININE-BSD FRML MDRD: 23 ML/MIN/1.73
GLUCOSE BLD-MCNC: 110 MG/DL (ref 65–99)
GLUCOSE UR STRIP-MCNC: NEGATIVE MG/DL
HCT VFR BLD AUTO: 26 % (ref 35.6–45.5)
HCT VFR BLD AUTO: 28.2 % (ref 34–46.6)
HGB BLD-MCNC: 8 G/DL (ref 11.9–15.5)
HGB UR QL STRIP.AUTO: NEGATIVE
HYALINE CASTS UR QL AUTO: NORMAL /LPF
IMM GRANULOCYTES # BLD: 0.02 10*3/MM3 (ref 0–0.03)
IMM GRANULOCYTES NFR BLD: 0.3 % (ref 0–0.5)
INR PPP: 1.14 (ref 0.9–1.1)
KETONES UR QL STRIP: NEGATIVE
LEUKOCYTE ESTERASE UR QL STRIP.AUTO: ABNORMAL
LYMPHOCYTES # BLD AUTO: 1.3 10*3/MM3 (ref 0.9–4.8)
LYMPHOCYTES NFR BLD AUTO: 17.6 % (ref 19.6–45.3)
MCH RBC QN AUTO: 27.2 PG (ref 26.9–32)
MCHC RBC AUTO-ENTMCNC: 30.8 G/DL (ref 32.4–36.3)
MCV RBC AUTO: 88.4 FL (ref 80.5–98.2)
MONOCYTES # BLD AUTO: 0.49 10*3/MM3 (ref 0.2–1.2)
MONOCYTES NFR BLD AUTO: 6.6 % (ref 5–12)
NEUTROPHILS # BLD AUTO: 5.38 10*3/MM3 (ref 1.9–8.1)
NEUTROPHILS NFR BLD AUTO: 72.6 % (ref 42.7–76)
NITRITE UR QL STRIP: NEGATIVE
PH UR STRIP.AUTO: 5.5 [PH] (ref 5–8)
PLATELET # BLD AUTO: 173 10*3/MM3 (ref 140–500)
PMV BLD AUTO: 11.2 FL (ref 6–12)
POTASSIUM BLD-SCNC: 4 MMOL/L (ref 3.5–5.2)
PROT UR QL STRIP: NEGATIVE
PROTHROMBIN TIME: 14.2 SECONDS (ref 11.7–14.2)
RBC # BLD AUTO: 2.94 10*6/MM3 (ref 3.9–5.2)
RBC # UR: NORMAL /HPF
REF LAB TEST METHOD: NORMAL
RH BLD: POSITIVE
SODIUM BLD-SCNC: 135 MMOL/L (ref 136–145)
SP GR UR STRIP: 1.01 (ref 1–1.03)
SQUAMOUS #/AREA URNS HPF: NORMAL /HPF
UROBILINOGEN UR QL STRIP: ABNORMAL
WBC NRBC COR # BLD: 7.4 10*3/MM3 (ref 4.5–10.7)
WBC UR QL AUTO: NORMAL /HPF

## 2017-08-15 PROCEDURE — 94799 UNLISTED PULMONARY SVC/PX: CPT

## 2017-08-15 PROCEDURE — 30233N1 TRANSFUSION OF NONAUTOLOGOUS RED BLOOD CELLS INTO PERIPHERAL VEIN, PERCUTANEOUS APPROACH: ICD-10-PCS | Performed by: INTERNAL MEDICINE

## 2017-08-15 PROCEDURE — 85610 PROTHROMBIN TIME: CPT | Performed by: INTERNAL MEDICINE

## 2017-08-15 PROCEDURE — 99233 SBSQ HOSP IP/OBS HIGH 50: CPT | Performed by: INTERNAL MEDICINE

## 2017-08-15 PROCEDURE — P9016 RBC LEUKOCYTES REDUCED: HCPCS

## 2017-08-15 PROCEDURE — 86900 BLOOD TYPING SEROLOGIC ABO: CPT

## 2017-08-15 PROCEDURE — 81001 URINALYSIS AUTO W/SCOPE: CPT | Performed by: INTERNAL MEDICINE

## 2017-08-15 PROCEDURE — 36430 TRANSFUSION BLD/BLD COMPNT: CPT

## 2017-08-15 PROCEDURE — 80048 BASIC METABOLIC PNL TOTAL CA: CPT | Performed by: INTERNAL MEDICINE

## 2017-08-15 PROCEDURE — 86900 BLOOD TYPING SEROLOGIC ABO: CPT | Performed by: INTERNAL MEDICINE

## 2017-08-15 PROCEDURE — 86901 BLOOD TYPING SEROLOGIC RH(D): CPT | Performed by: INTERNAL MEDICINE

## 2017-08-15 PROCEDURE — 86850 RBC ANTIBODY SCREEN: CPT | Performed by: INTERNAL MEDICINE

## 2017-08-15 PROCEDURE — 85025 COMPLETE CBC W/AUTO DIFF WBC: CPT | Performed by: INTERNAL MEDICINE

## 2017-08-15 PROCEDURE — 86923 COMPATIBILITY TEST ELECTRIC: CPT

## 2017-08-15 PROCEDURE — 25010000002 MORPHINE SULFATE (PF) 2 MG/ML SOLUTION: Performed by: INTERNAL MEDICINE

## 2017-08-15 PROCEDURE — 99232 SBSQ HOSP IP/OBS MODERATE 35: CPT | Performed by: INTERNAL MEDICINE

## 2017-08-15 RX ORDER — SODIUM CHLORIDE 9 MG/ML
50 INJECTION, SOLUTION INTRAVENOUS CONTINUOUS
Status: DISCONTINUED | OUTPATIENT
Start: 2017-08-15 | End: 2017-08-17

## 2017-08-15 RX ADMIN — PANTOPRAZOLE SODIUM 40 MG: 40 TABLET, DELAYED RELEASE ORAL at 05:06

## 2017-08-15 RX ADMIN — MORPHINE SULFATE 2 MG: 2 INJECTION, SOLUTION INTRAMUSCULAR; INTRAVENOUS at 05:05

## 2017-08-15 RX ADMIN — MORPHINE SULFATE 2 MG: 2 INJECTION, SOLUTION INTRAMUSCULAR; INTRAVENOUS at 00:12

## 2017-08-15 RX ADMIN — CARVEDILOL 12.5 MG: 12.5 TABLET, FILM COATED ORAL at 09:00

## 2017-08-15 RX ADMIN — CARVEDILOL 12.5 MG: 12.5 TABLET, FILM COATED ORAL at 18:44

## 2017-08-15 RX ADMIN — FERROUS SULFATE TAB 325 MG (65 MG ELEMENTAL FE) 325 MG: 325 (65 FE) TAB at 09:00

## 2017-08-15 RX ADMIN — FERROUS SULFATE TAB 325 MG (65 MG ELEMENTAL FE) 325 MG: 325 (65 FE) TAB at 18:44

## 2017-08-15 RX ADMIN — TEMAZEPAM 30 MG: 15 CAPSULE ORAL at 22:32

## 2017-08-15 RX ADMIN — SODIUM CHLORIDE 100 ML/HR: 9 INJECTION, SOLUTION INTRAVENOUS at 09:01

## 2017-08-15 RX ADMIN — ROPINIROLE 2 MG: 2 TABLET, FILM COATED ORAL at 19:59

## 2017-08-15 RX ADMIN — BUDESONIDE AND FORMOTEROL FUMARATE DIHYDRATE 2 PUFF: 160; 4.5 AEROSOL RESPIRATORY (INHALATION) at 05:24

## 2017-08-15 RX ADMIN — HYDROCORTISONE ACETATE 25 MG: 25 SUPPOSITORY RECTAL at 18:43

## 2017-08-15 RX ADMIN — MORPHINE SULFATE 2 MG: 2 INJECTION, SOLUTION INTRAMUSCULAR; INTRAVENOUS at 09:01

## 2017-08-15 RX ADMIN — NYSTATIN 500000 UNITS: 100000 SUSPENSION ORAL at 09:00

## 2017-08-15 RX ADMIN — ATORVASTATIN CALCIUM 40 MG: 20 TABLET, FILM COATED ORAL at 09:00

## 2017-08-15 RX ADMIN — CYCLOBENZAPRINE HYDROCHLORIDE 10 MG: 10 TABLET, FILM COATED ORAL at 19:58

## 2017-08-15 RX ADMIN — HYDROCORTISONE ACETATE 25 MG: 25 SUPPOSITORY RECTAL at 09:00

## 2017-08-15 RX ADMIN — MULTIPLE VITAMINS W/ MINERALS TAB 1 TABLET: TAB at 09:00

## 2017-08-15 RX ADMIN — TIOTROPIUM BROMIDE 1 CAPSULE: 18 CAPSULE ORAL; RESPIRATORY (INHALATION) at 05:24

## 2017-08-15 RX ADMIN — NYSTATIN 500000 UNITS: 100000 SUSPENSION ORAL at 18:43

## 2017-08-15 RX ADMIN — NITROGLYCERIN 1 INCH: 20 OINTMENT TOPICAL at 05:05

## 2017-08-15 RX ADMIN — CETIRIZINE HYDROCHLORIDE 10 MG: 10 TABLET, FILM COATED ORAL at 09:00

## 2017-08-15 RX ADMIN — MORPHINE SULFATE 2 MG: 2 INJECTION, SOLUTION INTRAMUSCULAR; INTRAVENOUS at 18:44

## 2017-08-15 RX ADMIN — NYSTATIN 500000 UNITS: 100000 SUSPENSION ORAL at 12:39

## 2017-08-15 RX ADMIN — ALBUTEROL SULFATE 2.5 MG: 2.5 SOLUTION RESPIRATORY (INHALATION) at 05:23

## 2017-08-15 RX ADMIN — SERTRALINE 100 MG: 100 TABLET, FILM COATED ORAL at 09:00

## 2017-08-15 RX ADMIN — ALBUTEROL SULFATE 2.5 MG: 2.5 SOLUTION RESPIRATORY (INHALATION) at 20:30

## 2017-08-15 RX ADMIN — SODIUM CHLORIDE 100 ML/HR: 9 INJECTION, SOLUTION INTRAVENOUS at 19:58

## 2017-08-15 RX ADMIN — MORPHINE SULFATE 2 MG: 2 INJECTION, SOLUTION INTRAMUSCULAR; INTRAVENOUS at 13:33

## 2017-08-15 RX ADMIN — MORPHINE SULFATE 2 MG: 2 INJECTION, SOLUTION INTRAMUSCULAR; INTRAVENOUS at 22:31

## 2017-08-15 NOTE — PLAN OF CARE
Problem: Patient Care Overview (Adult)  Goal: Plan of Care Review  Outcome: Ongoing (interventions implemented as appropriate)    08/15/17 0213   Coping/Psychosocial Response Interventions   Plan Of Care Reviewed With patient   Patient Care Overview   Progress improving   Outcome Evaluation   Outcome Summary/Follow up Plan VSS. Pain controlled with Morphine IV. UGI with SBF done today, Dr. Devine to see in am to go over results. Possibly dc in am. Will continue to monitor.        Goal: Adult Individualization and Mutuality  Outcome: Ongoing (interventions implemented as appropriate)  Goal: Discharge Needs Assessment  Outcome: Ongoing (interventions implemented as appropriate)    Problem: Fall Risk (Adult)  Goal: Identify Related Risk Factors and Signs and Symptoms  Outcome: Ongoing (interventions implemented as appropriate)  Goal: Absence of Falls  Outcome: Ongoing (interventions implemented as appropriate)    Problem: Pain, Acute (Adult)  Goal: Identify Related Risk Factors and Signs and Symptoms  Outcome: Ongoing (interventions implemented as appropriate)  Goal: Acceptable Pain Control/Comfort Level  Outcome: Ongoing (interventions implemented as appropriate)    Problem: GI Endoscopy (Adult)  Goal: Signs and Symptoms of Listed Potential Problems Will be Absent or Manageable (GI Endoscopy)  Outcome: Ongoing (interventions implemented as appropriate)

## 2017-08-15 NOTE — PROGRESS NOTES
"CC: MVR/PAF    Interval History:   She feels very weak and tired    Vital Signs  Temp:  [97.3 °F (36.3 °C)-99.8 °F (37.7 °C)] 97.3 °F (36.3 °C)  Heart Rate:  [56-72] 56  Resp:  [12-16] 16  BP: ()/(47-64) 95/47    Intake/Output Summary (Last 24 hours) at 08/15/17 0844  Last data filed at 08/15/17 0500   Gross per 24 hour   Intake              240 ml   Output              850 ml   Net             -610 ml     Flowsheet Rows         First Filed Value    Admission Height  67\" (170.2 cm) Documented at 08/09/2017 1152    Admission Weight  166 lb (75.3 kg) Documented at 08/09/2017 1152          PHYSICAL EXAM:  General: No acute distress  Resp:NL Rate, unlabored,decreased sounds throughout  CV:NL rate and rhythm, NL PMI, Nl S1 and S2, no Mumur, no gallop, no rub, No JVD. Normal pedal pulses  ABD:Nl sounds, no masses or tenderness, nondistended, no quarding or rebound  Neuro: alert,cooperative and oriented  Extr: No edema or cyanosis, moves all extremities      Results Review:      Results from last 7 days  Lab Units 08/15/17  0451   SODIUM mmol/L 135*   POTASSIUM mmol/L 4.0   CHLORIDE mmol/L 92*   CO2 mmol/L 27.6   BUN mg/dL 38*   CREATININE mg/dL 2.15*   GLUCOSE mg/dL 110*   CALCIUM mg/dL 8.6       Results from last 7 days  Lab Units 08/10/17  0548 08/09/17  1810 08/09/17  1206   TROPONIN T ng/mL <0.010 <0.010 <0.010       Results from last 7 days  Lab Units 08/15/17  0451   WBC 10*3/mm3 7.40   HEMOGLOBIN g/dL 8.0*   HEMATOCRIT % 26.0*   PLATELETS 10*3/mm3 173       Results from last 7 days  Lab Units 08/15/17  0451 08/14/17  0334 08/12/17  0410   INR  1.14* 1.94* 1.22*       Results from last 7 days  Lab Units 08/10/17  0548   CHOLESTEROL mg/dL 163       Results from last 7 days  Lab Units 08/10/17  0548   MAGNESIUM mg/dL 2.0       Results from last 7 days  Lab Units 08/10/17  0548   CHOLESTEROL mg/dL 163   TRIGLYCERIDES mg/dL 129   HDL CHOL mg/dL 58     @LABRCNT(bnp)@  I reviewed the patient's new clinical " results.  I personally viewed and interpreted the patient's EKG/Telemetry data        Medication Review:   Meds reviewed      sodium chloride 100 mL/hr       Assessment/Plan  1.  Lower GI bleed.  Status post polypectomy. Upper GI possible esophageal stricture her hemoglobin is even lower now down to 8 and she is very symptomatic from this.  We'll stop the warfarin.  Would probably benefit from blood.  In she's very symptomatic.  2.  Atrial fibrillation/flutter.  Status post atrial appendage closure and cryo-maze procedure no documented recurrence since her cardioversion in 2014.   as above we'll DC the warfarin   3.  History of severe mitral and tricuspid insufficiency status post mitral replacement with tissue valve and tricuspid valve repair.  4.  Marked pulmonary hypertension.  Not a transplant candidate follows with pulmonary.  5.  History of hypertension blood pressure adequately controlled.  6.  Renal insufficiency stable.  8.  Hyperlipidemia.        Earnest Gan MD  08/15/17  8:44 AM

## 2017-08-15 NOTE — PROGRESS NOTES
BGA/GI Progress Note   Chief Complaint:  Rectal bleeding    Subjective     Interval History: No further bleeding, small BM yesterday brown in color.  Some abd discomfort, worse with coughing and movement.  SBFT with some esophagitis, mild stenosis but no co's of dysphagia.  Hgb continues to drift down with no overt GI bleeding, coumadin to be dc'd per cards note.    History taken from: patient chart    Review of Systems:    The following systems were reviewed and negative;  gastrointestinal    Objective     Vital Signs  Temp:  [97.3 °F (36.3 °C)-99.8 °F (37.7 °C)] 97.3 °F (36.3 °C)  Heart Rate:  [56-72] 56  Resp:  [12-16] 16  BP: ()/(47-64) 95/47  Body mass index is 26 kg/(m^2).    Intake/Output Summary (Last 24 hours) at 08/15/17 1100  Last data filed at 08/15/17 0900   Gross per 24 hour   Intake              240 ml   Output             1100 ml   Net             -860 ml     I/O this shift:  In: -   Out: 250 [Urine:250]    Physical Exam:   General: patient awake, alert and cooperative, resting in bed, no distress   Eyes: Normal lids and lashes, no scleral icterus   Neck: supple, normal ROM, no tracheal deviation   Skin: warm and dry, not jaundiced   Cardiovascular: regular rhythm and rate, no murmurs auscultated   Pulm: congested to auscultation bilaterally, regular and unlabored, wet cough   Abdomen: soft, diffusely tender, nondistended; normal bowel sounds, hold abdomen with coughing due to discomfort   Rectal: deferred   Extremities: no rash or edema   Neurologic: Normal mood and behavior    All Medications Have Been Reviewed     Results Review:       Results from last 7 days  Lab Units 08/15/17  0451 08/14/17  0334 08/13/17  0449   WBC 10*3/mm3 7.40 8.06 6.89   HEMOGLOBIN g/dL 8.0* 8.6* 9.3*   HEMATOCRIT % 26.0* 27.4* 30.3*   PLATELETS 10*3/mm3 173 197 187         Results from last 7 days  Lab Units 08/15/17  0451 08/13/17  0449 08/12/17  0410  08/09/17  1206   SODIUM mmol/L 135* 141 140  < > 138    POTASSIUM mmol/L 4.0 4.0 4.5  < > 4.0   CHLORIDE mmol/L 92* 99 100  < > 94*   CO2 mmol/L 27.6 28.2 28.1  < > 30.7*   BUN mg/dL 38* 23 17  < > 14   CREATININE mg/dL 2.15* 1.27* 1.21*  < > 1.16*   CALCIUM mg/dL 8.6 9.6 9.2  < > 10.9*   BILIRUBIN mg/dL  --  0.5  --   --  0.6   ALK PHOS U/L  --  77  --   --  93   ALT (SGPT) U/L  --  24  --   --  27   AST (SGOT) U/L  --  20  --   --  30   GLUCOSE mg/dL 110* 105* 109*  < > 96   < > = values in this interval not displayed.      Results from last 7 days  Lab Units 08/15/17  0451 08/14/17  0334 08/12/17  0410   INR  1.14* 1.94* 1.22*       RADIOLOGY:    Imaging Results (last 72 hours)     Procedure Component Value Units Date/Time    FL Upper GI With Air Contrast & SBFT [[id number]] Collected:  08/14/17 1434     Updated:  08/14/17 1530    Narrative:       CLINICAL HISTORY: 62-year-old female with abdominal pain. Recent large  bowel hemorrhage with placement of colonoscopic hemostatic clips. Prior  hysterectomy, cholecystectomy, appendectomy, and right renal surgery.     EXAM: AIR-CONTRAST UPPER GI SERIES AND SMALL BOWEL FOLLOW-THROUGH DATED  08/14/2017.     FINDINGS: The preliminary radiographs of the abdomen and pelvis  demonstrate dual endoscopic type surgical clips superimposing transverse  colon silhouette, overlying the lower lumbar spine near the lumbosacral  junction. Surgical clips compatible with cholecystectomy are present in  the right upper abdomen. Surgical clips at the lower pelvic cavity are  demonstrated. All of these surgical findings are demonstrated on CT  abdomen and pelvis study sections of 03/03/2016, with the exception of  the endoscopic clips in the distribution of transverse colon reported on  report of recent colonoscopy of 08/10/2017. The patient underwent  colonoscopy with polypectomy on 08/04/2017. Stool and gas in the colon  and traces of gas in the stomach and small bowel are demonstrated.     AIR-CONTRAST UPPER GI SERIES     The patient  ingested gas-forming crystals, water, and both thick and  thin barium liquid mixtures for the current examinations. Rapid sequence  imaging of the neck in lateral and AP projection as the patient ingested  barium liquid mixtures demonstrates a trace of anterior cervical  esophageal web and apparent trace circumferential ring at the mid to  lower cervical esophagus but with residual luminal diameters of at least  1.8 x 1.5 cm at this level. There is also some focal fusiform narrowing  of caliber of the distal thoracic esophagus to approximately 1.7 cm  slightly above the esophagogastric junction which is of slightly larger  caliber. The esophagogastric junction lies above a small to moderate  size sliding hiatal herniation of the upper stomach. Some  gastroesophageal reflux was seen with patient recumbent. Esophageal  mucosal folds appear borderline to slightly thickened, suggestive of  esophagitis. The subdiaphragmatic portions of the stomach have  apparently normal caliber and contours. There was prompt emptying of  some barium from stomach into the duodenum. There is a small  diverticulum protruding from the second portion of duodenum. The  duodenal contours and mucosal pattern appear to be otherwise within  normal limits with top normal caliber of duodenum demonstrated at  initial fluoroscopy.     SMALL BOWEL FOLLOW-THROUGH     Progress of the barium meal through the mesenteric small bowel was  followed by exposure of serial abdominal radiographs. The barium began  to reach the cecum between 45 minutes and 1 hour following initial  administration of barium. Compression fluoroscopy and radiography of the  small bowel were performed by Dr. Sheppard with patient in multiple  projections on the x-ray table. Final overhead AP and bilateral oblique  projection radiographs of the small bowel were acquired. There is top  normal initial caliber of the duodenum and upper jejunum that promptly  normalizes. The small bowel  otherwise has normal caliber. Contours of  the terminal ileum and its peristalsis appeared to be normal. On  fluoroscopic spot imaging and on overhead radiography at conclusion of  the study there is suggestion of angular tenting of at least one small  bowel loop at the medial left mid to lower abdomen that raises the  question of adhesions. No focal caliber change to suggest partial  obstruction at the time of this exam was seen.     A total of 4 minutes 3 seconds fluoroscopy was used. A total of 10  digital overhead radiographs and 110 digital fluoroscopic spot image  radiographs were acquired.     CONCLUSION: A trace of ring stenosis at the cervical esophagus and a  more prominent but slightly larger caliber fusiform narrowing of the  distal esophagus at or near the esophageal A-line were demonstrated. A  small sliding hiatal herniation of the upper stomach is present. Some  gastroesophageal reflux and findings suggestive of esophagitis are  demonstrated. No esophageal, gastric, or duodenal mass or mucosal  ulceration was seen. Small bowel transit time is within normal limits.  Some modest initial prominence of duodenum and upper jejunal caliber was  noted but resolved. Some angular tenting of 1 or 2 small bowel loops in  the medial left mid to lower abdomen raises the question of some  intra-abdominal adhesions. No findings to suggest current partial  obstruction were demonstrated. The distal and terminal ileum have  apparently normal caliber and mucosal contours.     This report was finalized on 8/14/2017 3:27 PM by Dr. Ant Sheppard MD.             Assessment/Plan     Patient Active Problem List   Diagnosis Code   • PAF (paroxysmal atrial fibrillation) I48.0   • Hypertension I10   • Hyperlipidemia E78.5   • COPD (chronic obstructive pulmonary disease) J44.9   • Pain medication agreement Z79.899   • Hiatal hernia K44.9   • Primary osteoarthritis involving multiple joints M15.0   • Pulmonary hypertension I27.2    • S/P TVR (tricuspid valve repair) Z98.890   • Long term current use of anticoagulant Z79.01   • Pulmonary nodule R91.1   • Hemoptysis R04.2   • RLS (restless legs syndrome) G25.81   • Chronic low back pain M54.5, G89.29   • Dysplastic polyp of colon K63.5   • Lower GI bleed K92.2   • History of mitral valve replacement with tissue graft Z95.4   • Precordial pain R07.2   • Chronic respiratory failure with hypoxia J96.11   • Oral candidiasis B37.0   • VAN (acute kidney injury) N17.9     Edel Bernabe, APRN  08/15/17  11:00 AM    Impression:  #1 Lower GI bleeding: resolved. Hgb continues to drift down with no overt GI bleeding.  Onset in the postprocedural setting s/p colonoscopy with bleeding internal hemorrhoids. Congested mucosa in transverse colon and clips placed on 8/4/17  #2 anticoagulant therapy: Coumadin stopped 8/15 per cards with continued drop in Hgb  #3 Abdominal discomfort- SBFT with no obstructive process, evidence of esophagitis and mild stenosis,  #4 ANNA- oral iron therapy initiated    Recommendations:  - monitor H and H  - continue PPI  - monitor BM's   - EGD for further evaluation of esophageal abnormalities on upper GI          Rick Morales M.D.  [facility]  [address] [place] [address] [address] [place] [address]  Office: [phone]

## 2017-08-15 NOTE — PROGRESS NOTES
"    DAILY PROGRESS NOTE  [facility]    Patient Identification:  Name: Paz Browne  Age: 62 y.o.  Sex: female  :  1955  MRN: [id number]         Primary Care Physician: Javan Martinez MD    Subjective:  Interval History: feels weak, tired, breathing worse today but still on 3L.  Hgb drop further to 8.0, creanine increased today.  No bleeding, decreased appetite or diarrhea. no n/v/d - no further bleeding     Objective: d/w family member at bedside    Scheduled Meds:    atorvastatin 40 mg Oral Daily   budesonide-formoterol 2 puff Inhalation BID - RT   carvedilol 12.5 mg Oral BID With Meals   cetirizine 10 mg Oral Daily   cyclobenzaprine 10 mg Oral Nightly   ferrous sulfate 325 mg Oral BID With Meals   furosemide 40 mg Oral BID   hydrocortisone 25 mg Rectal BID   lisinopril 10 mg Oral Daily   multivitamin with minerals 1 tablet Oral Daily   nitroglycerin 1 inch Topical Q6H   nystatin 5 mL Oral 4x Daily   pantoprazole 40 mg Oral QAM   polyethylene glycol 17 g Oral Daily   rOPINIRole 2 mg Oral Nightly   sertraline 100 mg Oral Daily   tiotropium 1 capsule Inhalation Daily - RT   warfarin 3 mg Oral Daily     Continuous Infusions:     Vital signs in last 24 hours:  Temp:  [97.3 °F (36.3 °C)-99.8 °F (37.7 °C)] 97.3 °F (36.3 °C)  Heart Rate:  [56-72] 56  Resp:  [12-16] 16  BP: ()/(47-64) 95/47    Intake/Output:    Intake/Output Summary (Last 24 hours) at 08/15/17 0807  Last data filed at 08/15/17 0500   Gross per 24 hour   Intake              240 ml   Output              850 ml   Net             -610 ml       Exam:  BP 95/47 (BP Location: Left arm, Patient Position: Lying)  Pulse 56  Temp 97.3 °F (36.3 °C) (Oral)   Resp 16  Ht 67\" (170.2 cm)  Wt 166 lb (75.3 kg)  SpO2 99%  BMI 26 kg/m2    General Appearance:    Alert, cooperative, no distress, AAOx3                         Throat:   Lips, tongue, gums normal; oral mucosa pink and moist, posterior   oropharynx with oral candidiasis    "                        Neck:   Supple, no JVD                         Lungs:    Clear to auscultation bilaterally, respirations unlabored                          Heart:    Regular rate and rhythm, S1 and S2 normal                  Abdomen:     Soft, non-tender, bowel sounds active                 Extremities:   Extremities normal, atraumatic, no cyanosis or edema                         Data Review:  Labs in chart were reviewed.    Assessment:  Active Hospital Problems (** Indicates Principal Problem)    Diagnosis Date Noted   • Oral candidiasis [B37.0] 08/14/2017   • Chronic respiratory failure with hypoxia [J96.11] 08/13/2017   • History of mitral valve replacement with tissue graft [Z95.4] 08/11/2017   • Precordial pain [R07.2] 08/11/2017   • Lower GI bleed [K92.2] 08/09/2017   • Long term current use of anticoagulant [Z79.01] 10/13/2016   • Pulmonary hypertension [I27.2]    • Hypertension [I10]    • COPD (chronic obstructive pulmonary disease) [J44.9]    • PAF (paroxysmal atrial fibrillation) [I48.0] 01/25/2016      Resolved Hospital Problems    Diagnosis Date Noted Date Resolved   No resolved problems to display.       Plan:    VAN:  -significant jump today  -start IV fluids  -stop lasix and lisinopril  -check u/a  -bladder scan  -recheck in am    Oral candidiasis  -due to symbicort  -nystatin swish and swallow    Acute anemia  Cscope w/ mucosa abnormality that was clipped w/ bleeding internal hemorrhoids    -warfarin stopped  -Hgb keeps dropping despite not bleeding 10.6-9.0-9.3-8.9-9.3-8.6-8.0  -1 unit PRBC today  -added miralax  SBFT showed esophagitis and mild stenosis  -EGD to further eval      PAF/MVR/TV w/ CP - ekg unchanged and improved troponins - warfarin stopped per cardiology for dropping hgb        Severe Pulm HTN - stable, sees Dr. Melo      COPD with chronic respiratory failure: on 3L which his home dose, stable    HTN - ok     Offered PT but she declined    D/W RN    DIPSO: TBD    Fam Mai  MD Maria E  8/15/2017  8:07 AM

## 2017-08-15 NOTE — PLAN OF CARE
Problem: Patient Care Overview (Adult)  Goal: Plan of Care Review  Outcome: Ongoing (interventions implemented as appropriate)    08/15/17 1758   Coping/Psychosocial Response Interventions   Plan Of Care Reviewed With patient   Patient Care Overview   Progress improving   Outcome Evaluation   Outcome Summary/Follow up Plan pt hgb down at 8.0 this morning. receiving 1 unit prbc this evening. fluids added. coumadin held for now. pt fatigued. continue to monitor closely       Goal: Adult Individualization and Mutuality  Outcome: Ongoing (interventions implemented as appropriate)  Goal: Discharge Needs Assessment  Outcome: Ongoing (interventions implemented as appropriate)    08/15/17 1758   Discharge Needs Assessment   Concerns To Be Addressed basic needs concerns   Discharge Disposition still a patient         Problem: Fall Risk (Adult)  Goal: Identify Related Risk Factors and Signs and Symptoms  Outcome: Ongoing (interventions implemented as appropriate)    08/15/17 1758   Fall Risk   Fall Risk: Related Risk Factors fatigue/slow reaction;environment unfamiliar   Fall Risk: Signs and Symptoms presence of risk factors       Goal: Absence of Falls  Outcome: Ongoing (interventions implemented as appropriate)    08/15/17 1758   Fall Risk (Adult)   Absence of Falls making progress toward outcome         Problem: Pain, Acute (Adult)  Goal: Identify Related Risk Factors and Signs and Symptoms  Outcome: Ongoing (interventions implemented as appropriate)  Goal: Acceptable Pain Control/Comfort Level  Outcome: Ongoing (interventions implemented as appropriate)    08/15/17 1758   Pain, Acute (Adult)   Acceptable Pain Control/Comfort Level making progress toward outcome         Problem: GI Endoscopy (Adult)  Goal: Signs and Symptoms of Listed Potential Problems Will be Absent or Manageable (GI Endoscopy)  Outcome: Ongoing (interventions implemented as appropriate)    08/15/17 1758   GI Endoscopy   Problems Assessed (GI Endoscopy)  all   Problems Present (GI Endoscopy) pain

## 2017-08-16 PROBLEM — D64.9 ANEMIA: Status: ACTIVE | Noted: 2017-08-09

## 2017-08-16 LAB
ABO + RH BLD: NORMAL
ALBUMIN SERPL-MCNC: 3.8 G/DL (ref 3.5–5.2)
ALP SERPL-CCNC: 79 U/L (ref 39–117)
ALT SERPL W P-5'-P-CCNC: 25 U/L (ref 1–33)
ANION GAP SERPL CALCULATED.3IONS-SCNC: 11.9 MMOL/L
AST SERPL-CCNC: 32 U/L (ref 1–32)
BASOPHILS # BLD AUTO: 0.02 10*3/MM3 (ref 0–0.2)
BASOPHILS NFR BLD AUTO: 0.3 % (ref 0–1.5)
BH BB BLOOD EXPIRATION DATE: NORMAL
BH BB BLOOD TYPE BARCODE: 6200
BH BB DISPENSE STATUS: NORMAL
BH BB PRODUCT CODE: NORMAL
BH BB UNIT NUMBER: NORMAL
BILIRUB CONJ SERPL-MCNC: <0.2 MG/DL (ref 0–0.3)
BILIRUB INDIRECT SERPL-MCNC: NORMAL MG/DL
BILIRUB SERPL-MCNC: 0.7 MG/DL (ref 0.1–1.2)
BUN BLD-MCNC: 41 MG/DL (ref 8–23)
BUN/CREAT SERPL: 24.1 (ref 7–25)
CALCIUM SPEC-SCNC: 8.5 MG/DL (ref 8.6–10.5)
CHLORIDE SERPL-SCNC: 95 MMOL/L (ref 98–107)
CO2 SERPL-SCNC: 26.1 MMOL/L (ref 22–29)
CREAT BLD-MCNC: 1.7 MG/DL (ref 0.57–1)
DEPRECATED RDW RBC AUTO: 47.1 FL (ref 37–54)
EOSINOPHIL # BLD AUTO: 0.19 10*3/MM3 (ref 0–0.7)
EOSINOPHIL NFR BLD AUTO: 2.6 % (ref 0.3–6.2)
ERYTHROCYTE [DISTWIDTH] IN BLOOD BY AUTOMATED COUNT: 14.5 % (ref 11.7–13)
GFR SERPL CREATININE-BSD FRML MDRD: 30 ML/MIN/1.73
GLUCOSE BLD-MCNC: 109 MG/DL (ref 65–99)
HCT VFR BLD AUTO: 28.1 % (ref 35.6–45.5)
HGB BLD-MCNC: 8.8 G/DL (ref 11.9–15.5)
IMM GRANULOCYTES # BLD: 0 10*3/MM3 (ref 0–0.03)
IMM GRANULOCYTES NFR BLD: 0 % (ref 0–0.5)
INR PPP: 2.64 (ref 0.9–1.1)
INR PPP: 2.66 (ref 0.9–1.1)
LYMPHOCYTES # BLD AUTO: 0.84 10*3/MM3 (ref 0.9–4.8)
LYMPHOCYTES NFR BLD AUTO: 11.3 % (ref 19.6–45.3)
MCH RBC QN AUTO: 27.6 PG (ref 26.9–32)
MCHC RBC AUTO-ENTMCNC: 31.3 G/DL (ref 32.4–36.3)
MCV RBC AUTO: 88.1 FL (ref 80.5–98.2)
MONOCYTES # BLD AUTO: 0.48 10*3/MM3 (ref 0.2–1.2)
MONOCYTES NFR BLD AUTO: 6.5 % (ref 5–12)
NEUTROPHILS # BLD AUTO: 5.88 10*3/MM3 (ref 1.9–8.1)
NEUTROPHILS NFR BLD AUTO: 79.3 % (ref 42.7–76)
PLATELET # BLD AUTO: 176 10*3/MM3 (ref 140–500)
PMV BLD AUTO: 11.2 FL (ref 6–12)
POTASSIUM BLD-SCNC: 4.1 MMOL/L (ref 3.5–5.2)
PROT SERPL-MCNC: 6.3 G/DL (ref 6–8.5)
PROTHROMBIN TIME: 27.4 SECONDS (ref 11.7–14.2)
PROTHROMBIN TIME: 27.5 SECONDS (ref 11.7–14.2)
RBC # BLD AUTO: 3.19 10*6/MM3 (ref 3.9–5.2)
SODIUM BLD-SCNC: 133 MMOL/L (ref 136–145)
UNIT  ABO: NORMAL
UNIT  RH: NORMAL
WBC NRBC COR # BLD: 7.41 10*3/MM3 (ref 4.5–10.7)

## 2017-08-16 PROCEDURE — 80076 HEPATIC FUNCTION PANEL: CPT | Performed by: INTERNAL MEDICINE

## 2017-08-16 PROCEDURE — 85610 PROTHROMBIN TIME: CPT | Performed by: INTERNAL MEDICINE

## 2017-08-16 PROCEDURE — 99232 SBSQ HOSP IP/OBS MODERATE 35: CPT | Performed by: INTERNAL MEDICINE

## 2017-08-16 PROCEDURE — 25010000002 ONDANSETRON PER 1 MG: Performed by: INTERNAL MEDICINE

## 2017-08-16 PROCEDURE — 94762 N-INVAS EAR/PLS OXIMTRY CONT: CPT

## 2017-08-16 PROCEDURE — 25010000002 FUROSEMIDE PER 20 MG: Performed by: INTERNAL MEDICINE

## 2017-08-16 PROCEDURE — 30233K1 TRANSFUSION OF NONAUTOLOGOUS FROZEN PLASMA INTO PERIPHERAL VEIN, PERCUTANEOUS APPROACH: ICD-10-PCS | Performed by: INTERNAL MEDICINE

## 2017-08-16 PROCEDURE — 99233 SBSQ HOSP IP/OBS HIGH 50: CPT | Performed by: INTERNAL MEDICINE

## 2017-08-16 PROCEDURE — 94760 N-INVAS EAR/PLS OXIMETRY 1: CPT

## 2017-08-16 PROCEDURE — 80048 BASIC METABOLIC PNL TOTAL CA: CPT | Performed by: INTERNAL MEDICINE

## 2017-08-16 PROCEDURE — 25010000002 MORPHINE SULFATE (PF) 2 MG/ML SOLUTION: Performed by: INTERNAL MEDICINE

## 2017-08-16 PROCEDURE — 94799 UNLISTED PULMONARY SVC/PX: CPT

## 2017-08-16 PROCEDURE — 85025 COMPLETE CBC W/AUTO DIFF WBC: CPT | Performed by: INTERNAL MEDICINE

## 2017-08-16 RX ORDER — FUROSEMIDE 10 MG/ML
40 INJECTION INTRAMUSCULAR; INTRAVENOUS ONCE
Status: COMPLETED | OUTPATIENT
Start: 2017-08-16 | End: 2017-08-16

## 2017-08-16 RX ORDER — SODIUM CHLORIDE 0.9 % (FLUSH) 0.9 %
1-10 SYRINGE (ML) INJECTION AS NEEDED
Status: DISCONTINUED | OUTPATIENT
Start: 2017-08-17 | End: 2017-08-17

## 2017-08-16 RX ADMIN — MULTIPLE VITAMINS W/ MINERALS TAB 1 TABLET: TAB at 14:31

## 2017-08-16 RX ADMIN — HYDROCORTISONE ACETATE 25 MG: 25 SUPPOSITORY RECTAL at 08:52

## 2017-08-16 RX ADMIN — SODIUM CHLORIDE 100 ML/HR: 9 INJECTION, SOLUTION INTRAVENOUS at 05:22

## 2017-08-16 RX ADMIN — TEMAZEPAM 30 MG: 15 CAPSULE ORAL at 22:42

## 2017-08-16 RX ADMIN — ROPINIROLE 2 MG: 2 TABLET, FILM COATED ORAL at 20:29

## 2017-08-16 RX ADMIN — CYCLOBENZAPRINE HYDROCHLORIDE 10 MG: 10 TABLET, FILM COATED ORAL at 20:29

## 2017-08-16 RX ADMIN — HYDROCODONE BITARTRATE AND ACETAMINOPHEN 1 TABLET: 5; 325 TABLET ORAL at 09:02

## 2017-08-16 RX ADMIN — MORPHINE SULFATE 2 MG: 2 INJECTION, SOLUTION INTRAMUSCULAR; INTRAVENOUS at 06:36

## 2017-08-16 RX ADMIN — MORPHINE SULFATE 2 MG: 2 INJECTION, SOLUTION INTRAMUSCULAR; INTRAVENOUS at 15:17

## 2017-08-16 RX ADMIN — TIOTROPIUM BROMIDE 1 CAPSULE: 18 CAPSULE ORAL; RESPIRATORY (INHALATION) at 07:32

## 2017-08-16 RX ADMIN — ALBUTEROL SULFATE 2.5 MG: 2.5 SOLUTION RESPIRATORY (INHALATION) at 19:01

## 2017-08-16 RX ADMIN — FUROSEMIDE 40 MG: 10 INJECTION, SOLUTION INTRAMUSCULAR; INTRAVENOUS at 21:01

## 2017-08-16 RX ADMIN — CETIRIZINE HYDROCHLORIDE 10 MG: 10 TABLET, FILM COATED ORAL at 14:31

## 2017-08-16 RX ADMIN — NYSTATIN 500000 UNITS: 100000 SUSPENSION ORAL at 18:45

## 2017-08-16 RX ADMIN — FERROUS SULFATE TAB 325 MG (65 MG ELEMENTAL FE) 325 MG: 325 (65 FE) TAB at 14:31

## 2017-08-16 RX ADMIN — MORPHINE SULFATE 2 MG: 2 INJECTION, SOLUTION INTRAMUSCULAR; INTRAVENOUS at 22:44

## 2017-08-16 RX ADMIN — BUDESONIDE AND FORMOTEROL FUMARATE DIHYDRATE 2 PUFF: 160; 4.5 AEROSOL RESPIRATORY (INHALATION) at 19:07

## 2017-08-16 RX ADMIN — PANTOPRAZOLE SODIUM 40 MG: 40 TABLET, DELAYED RELEASE ORAL at 05:18

## 2017-08-16 RX ADMIN — NYSTATIN 500000 UNITS: 100000 SUSPENSION ORAL at 20:29

## 2017-08-16 RX ADMIN — ONDANSETRON 4 MG: 2 INJECTION INTRAMUSCULAR; INTRAVENOUS at 21:42

## 2017-08-16 RX ADMIN — ALBUTEROL SULFATE 2.5 MG: 2.5 SOLUTION RESPIRATORY (INHALATION) at 07:31

## 2017-08-16 RX ADMIN — CARVEDILOL 12.5 MG: 12.5 TABLET, FILM COATED ORAL at 18:45

## 2017-08-16 RX ADMIN — MORPHINE SULFATE 2 MG: 2 INJECTION, SOLUTION INTRAMUSCULAR; INTRAVENOUS at 02:40

## 2017-08-16 RX ADMIN — MORPHINE SULFATE 2 MG: 2 INJECTION, SOLUTION INTRAMUSCULAR; INTRAVENOUS at 10:50

## 2017-08-16 RX ADMIN — SODIUM CHLORIDE 100 ML/HR: 9 INJECTION, SOLUTION INTRAVENOUS at 14:32

## 2017-08-16 RX ADMIN — HYDROCODONE BITARTRATE AND ACETAMINOPHEN 1 TABLET: 5; 325 TABLET ORAL at 05:17

## 2017-08-16 RX ADMIN — SERTRALINE 100 MG: 100 TABLET, FILM COATED ORAL at 14:31

## 2017-08-16 RX ADMIN — MORPHINE SULFATE 2 MG: 2 INJECTION, SOLUTION INTRAMUSCULAR; INTRAVENOUS at 19:27

## 2017-08-16 RX ADMIN — ATORVASTATIN CALCIUM 40 MG: 20 TABLET, FILM COATED ORAL at 14:31

## 2017-08-16 RX ADMIN — HYDROCODONE BITARTRATE AND ACETAMINOPHEN 1 TABLET: 5; 325 TABLET ORAL at 22:42

## 2017-08-16 NOTE — PLAN OF CARE
Problem: Patient Care Overview (Adult)  Goal: Plan of Care Review  Outcome: Ongoing (interventions implemented as appropriate)    08/15/17 1758 08/16/17 0023   Coping/Psychosocial Response Interventions   Plan Of Care Reviewed With patient --    Patient Care Overview   Progress improving --    Outcome Evaluation   Outcome Summary/Follow up Plan --  VSS. Received 1 unit PRBC for hgb 8.0 today. IVF started for high BUN and Creat. Holding coumadin d/t continued decrease in Hgb. Plan for EGD at some point to further evaluate esophagus. Pain relief with morphine. Will continue to monitor.        Goal: Adult Individualization and Mutuality  Outcome: Ongoing (interventions implemented as appropriate)  Goal: Discharge Needs Assessment  Outcome: Ongoing (interventions implemented as appropriate)    Problem: Fall Risk (Adult)  Goal: Identify Related Risk Factors and Signs and Symptoms  Outcome: Ongoing (interventions implemented as appropriate)  Goal: Absence of Falls  Outcome: Ongoing (interventions implemented as appropriate)    Problem: Pain, Acute (Adult)  Goal: Identify Related Risk Factors and Signs and Symptoms  Outcome: Ongoing (interventions implemented as appropriate)  Goal: Acceptable Pain Control/Comfort Level  Outcome: Ongoing (interventions implemented as appropriate)    Problem: GI Endoscopy (Adult)  Goal: Signs and Symptoms of Listed Potential Problems Will be Absent or Manageable (GI Endoscopy)  Outcome: Ongoing (interventions implemented as appropriate)

## 2017-08-16 NOTE — PROGRESS NOTES
Name: Paz Browne ADMIT: 2017   : 1955  PCP: Javan Martinez MD    MRN: [id number] LOS: 7 days   AGE/SEX: 62 y.o. female  ROOM: [facility]/   Subjective   Subjective  No bleeding reported. Denies CP SOA Nausea.    Objective   Vital Signs  Temp:  [97.7 °F (36.5 °C)-98.7 °F (37.1 °C)] 97.8 °F (36.6 °C)  Heart Rate:  [53-63] 53  Resp:  [16-18] 16  BP: (101-122)/(48-64) 122/48  SpO2:  [92 %-99 %] 97 %  on  Flow (L/min):  [2-3] 3;   O2 Device: nasal cannula  Body mass index is 26 kg/(m^2).    Physical Exam   Constitutional: She appears well-developed and well-nourished. No distress.   HENT:   Head: Normocephalic and atraumatic.   Eyes: EOM are normal. Pupils are equal, round, and reactive to light.   Neck: Normal range of motion. Neck supple.   Cardiovascular: Normal rate, regular rhythm and intact distal pulses.    Pulmonary/Chest: Effort normal and breath sounds normal. She has no wheezes.   Abdominal: Soft. There is no tenderness.   Musculoskeletal: Normal range of motion. She exhibits no edema.   Neurological: She is alert. No cranial nerve deficit.   Skin: Skin is warm and dry. She is not diaphoretic.   Psychiatric: She has a normal mood and affect. Her behavior is normal.   Nursing note and vitals reviewed.      Results Review:       I reviewed the patient's new clinical results.    Results from last 7 days  Lab Units 17  0349 08/15/17  0451 17  0334 17  0449 17  0410 17  1540 17  0601 08/10/17  0548   WBC 10*3/mm3 7.41 7.40 8.06 6.89 7.53  --  5.37 8.34   HEMOGLOBIN g/dL 8.8* 8.0* 8.6* 9.3* 8.9* 9.3* 9.0* 10.6*   PLATELETS 10*3/mm3 176 173 197 187 189  --  181 230     Results from last 7 days  Lab Units 17  0349 08/15/17  0451 17  0449 17  0410 17  0601 08/10/17  0548 17  1206   SODIUM mmol/L 133* 135* 141 140 141 140 138   POTASSIUM mmol/L 4.1 4.0 4.0 4.5 4.1 3.9 4.0   CHLORIDE mmol/L 95* 92* 99 100 102 98 94*   CO2 mmol/L 26.1  27.6 28.2 28.1 30.1* 27.2 30.7*   BUN mg/dL 41* 38* 23 17 13 12 14   CREATININE mg/dL 1.70* 2.15* 1.27* 1.21* 0.88 1.09* 1.16*   GLUCOSE mg/dL 109* 110* 105* 109* 106* 118* 96   Estimated Creatinine Clearance: 35.4 mL/min (by C-G formula based on Cr of 1.7).  Results from last 7 days  Lab Units 08/16/17  0349 08/15/17  0451 08/13/17  0449 08/12/17  0410 08/11/17  0601 08/10/17  0548 08/09/17  1206   CALCIUM mg/dL 8.5* 8.6 9.6 9.2 9.0 9.2 10.9*   ALBUMIN g/dL  --   --  4.10  --   --   --  4.50   MAGNESIUM mg/dL  --   --   --   --   --  2.0  --    PHOSPHORUS mg/dL  --   --   --   --   --  4.0  --          atorvastatin 40 mg Oral Daily   budesonide-formoterol 2 puff Inhalation BID - RT   carvedilol 12.5 mg Oral BID With Meals   cetirizine 10 mg Oral Daily   cyclobenzaprine 10 mg Oral Nightly   ferrous sulfate 325 mg Oral BID With Meals   hydrocortisone 25 mg Rectal BID   multivitamin with minerals 1 tablet Oral Daily   nystatin 5 mL Oral 4x Daily   pantoprazole 40 mg Oral QAM   polyethylene glycol 17 g Oral Daily   rOPINIRole 2 mg Oral Nightly   sertraline 100 mg Oral Daily   tiotropium 1 capsule Inhalation Daily - RT       sodium chloride 100 mL/hr Last Rate: 100 mL/hr (08/16/17 0522)   NPO Diet      Assessment/Plan   Assessment:     Active Hospital Problems (** Indicates Principal Problem)    Diagnosis Date Noted   • VAN (acute kidney injury) [N17.9] 08/15/2017   • Oral candidiasis [B37.0] 08/14/2017   • Chronic respiratory failure with hypoxia [J96.11] 08/13/2017   • History of mitral valve replacement with tissue graft [Z95.4] 08/11/2017   • Precordial pain [R07.2] 08/11/2017   • Lower GI bleed [K92.2] 08/09/2017   • Long term current use of anticoagulant [Z79.01] 10/13/2016   • Pulmonary hypertension [I27.2]    • Hypertension [I10]    • COPD (chronic obstructive pulmonary disease) [J44.9]    • PAF (paroxysmal atrial fibrillation) [I48.0] 01/25/2016      Resolved Hospital Problems    Diagnosis Date Noted Date  Resolved   No resolved problems to display.       Plan:   - Anemia: GI losses. CScope with clipped bleeding hemorrhoid. SBFT with stenosis and esophagitis. Received 1 unit PRBC with improvement of Hgb to 8.8. Plan for EGD with possible dilation today. INR up to 2.6. Will repeat INR and order FFP to be prepared in case repeat INR >1.5. Coumadin has been held.  - PAF: Rate control. AC held. Cardiology following.  - Chronic Hypoxic Resp Failure/COPD: On home 3L.  - VAN: Cr improving. Continue to monitor off lasix and ACE.    Disposition  TBD.      Nick Tovar MD  [facility]  08/16/17  11:39 AM

## 2017-08-16 NOTE — PROGRESS NOTES
"CC: MVR/PAF    Interval History:   Still some dyspnea.  Received blood yesterday    Vital Signs  Temp:  [97.7 °F (36.5 °C)-98.7 °F (37.1 °C)] 97.7 °F (36.5 °C)  Heart Rate:  [52-63] 55  Resp:  [16-18] 16  BP: ()/(46-64) 121/53    Intake/Output Summary (Last 24 hours) at 08/16/17 0812  Last data filed at 08/16/17 0617   Gross per 24 hour   Intake          1515.33 ml   Output             1750 ml   Net          -234.67 ml     Flowsheet Rows         First Filed Value    Admission Height  67\" (170.2 cm) Documented at 08/09/2017 1152    Admission Weight  166 lb (75.3 kg) Documented at 08/09/2017 1152          PHYSICAL EXAM:  General: No acute distress  Resp:NL Rate, unlabored, Diffuse coarse rales diminished sounds especially bases  CV:NL rate and rhythm, NL PMI, Nl S1 and S2, 2/6 systolic Mumur, no gallop, no rub, No JVD. Normal pedal pulses  ABD:Nl sounds, no masses or tenderness, nondistended, no quarding or rebound  Neuro: alert,cooperative and oriented  Extr: No edema or cyanosis, moves all extremities      Results Review:      Results from last 7 days  Lab Units 08/16/17  0349   SODIUM mmol/L 133*   POTASSIUM mmol/L 4.1   CHLORIDE mmol/L 95*   CO2 mmol/L 26.1   BUN mg/dL 41*   CREATININE mg/dL 1.70*   GLUCOSE mg/dL 109*   CALCIUM mg/dL 8.5*       Results from last 7 days  Lab Units 08/10/17  0548 08/09/17  1810 08/09/17  1206   TROPONIN T ng/mL <0.010 <0.010 <0.010       Results from last 7 days  Lab Units 08/16/17  0349   WBC 10*3/mm3 7.41   HEMOGLOBIN g/dL 8.8*   HEMATOCRIT % 28.1*   PLATELETS 10*3/mm3 176       Results from last 7 days  Lab Units 08/16/17  0349 08/15/17  0451 08/14/17  0334   INR  2.64* 1.14* 1.94*       Results from last 7 days  Lab Units 08/10/17  0548   CHOLESTEROL mg/dL 163       Results from last 7 days  Lab Units 08/10/17  0548   MAGNESIUM mg/dL 2.0       Results from last 7 days  Lab Units 08/10/17  0548   CHOLESTEROL mg/dL 163   TRIGLYCERIDES mg/dL 129   HDL CHOL mg/dL 58 "     @LABRCNT(bnp)@  I reviewed the patient's new clinical results.  I personally viewed and interpreted the patient's EKG/Telemetry data        Medication Review:   Meds reviewed      sodium chloride 100 mL/hr Last Rate: 100 mL/hr (08/16/17 0522)       Assessment/Plan  1.  Lower GI bleed.  Status post polypectomy. Upper GI possible esophageal stricture. Received blood yesterday hemoglobin up to 8.8.  Plan apparently for upper endoscopy today  2.  Atrial fibrillation/flutter.  Status post atrial appendage closure and cryo-maze procedure no documented recurrence since her cardioversion in 2014.  We'll maintain her off warfarin for now   3.  History of severe mitral and tricuspid insufficiency status post mitral replacement with tissue valve and tricuspid valve repair.  4.  Marked pulmonary hypertension.  Not a transplant candidate follows with pulmonary.  5.  History of hypertension blood pressure adequately controlled.  6.  Renal insufficiency improved  8.  Hyperlipidemia.        Earnest Gan MD  08/16/17  8:12 AM

## 2017-08-16 NOTE — PLAN OF CARE
Problem: Patient Care Overview (Adult)  Goal: Plan of Care Review  Outcome: Ongoing (interventions implemented as appropriate)    08/16/17 5354   Coping/Psychosocial Response Interventions   Plan Of Care Reviewed With patient   Patient Care Overview   Progress improving         Problem: Fall Risk (Adult)  Goal: Absence of Falls  Outcome: Ongoing (interventions implemented as appropriate)    Problem: Pain, Acute (Adult)  Goal: Acceptable Pain Control/Comfort Level  Outcome: Ongoing (interventions implemented as appropriate)    Problem: GI Endoscopy (Adult)  Goal: Signs and Symptoms of Listed Potential Problems Will be Absent or Manageable (GI Endoscopy)  Outcome: Ongoing (interventions implemented as appropriate)

## 2017-08-16 NOTE — PROGRESS NOTES
[facility]/GI Progress Note   Chief Complaint:  Rectal bleeding, abd pain/bloating    Subjective     Interval History: No further bleeding.      History taken from: patient chart RN    Review of Systems:    All systems were reviewed and negative except for:  Gastrointestinal: postitive for  pain and abd cramping and bloating    Objective     Vital Signs  Temp:  [97.7 °F (36.5 °C)-98.7 °F (37.1 °C)] 97.8 °F (36.6 °C)  Heart Rate:  [53-63] 53  Resp:  [16-18] 16  BP: (101-122)/(48-64) 122/48  Body mass index is 26 kg/(m^2).    Intake/Output Summary (Last 24 hours) at 08/16/17 1132  Last data filed at 08/16/17 1048   Gross per 24 hour   Intake          1515.33 ml   Output             2050 ml   Net          -534.67 ml     I/O this shift:  In: -   Out: 550 [Urine:550]    Physical Exam:   General: patient awake, alert and cooperative   Eyes: Normal lids and lashes, no scleral icterus   Neck: supple, normal ROM, no tracheal deviation   Skin: warm and dry, not jaundiced   Cardiovascular: regular rhythm and rate, no murmurs auscultated   Pulm: clear to auscultation bilaterally, regular and unlabored   Abdomen: obese, round, soft, lower abdomen tender, nondistended; normal bowel sounds   Rectal: deferred   Extremities: no rash or edema   Neurologic: Normal mood and behavior    All Medications Have Been Reviewed     Results Review:       Results from last 7 days  Lab Units 08/16/17  0349 08/15/17  0451 08/14/17  0334   WBC 10*3/mm3 7.41 7.40 8.06   HEMOGLOBIN g/dL 8.8* 8.0* 8.6*   HEMATOCRIT % 28.1* 26.0* 27.4*   PLATELETS 10*3/mm3 176 173 197         Results from last 7 days  Lab Units 08/16/17  0349 08/15/17  0451 08/13/17  0449  08/09/17  1206   SODIUM mmol/L 133* 135* 141  < > 138   POTASSIUM mmol/L 4.1 4.0 4.0  < > 4.0   CHLORIDE mmol/L 95* 92* 99  < > 94*   CO2 mmol/L 26.1 27.6 28.2  < > 30.7*   BUN mg/dL 41* 38* 23  < > 14   CREATININE mg/dL 1.70* 2.15* 1.27*  < > 1.16*   CALCIUM mg/dL 8.5* 8.6 9.6  < > 10.9*    BILIRUBIN mg/dL  --   --  0.5  --  0.6   ALK PHOS U/L  --   --  77  --  93   ALT (SGPT) U/L  --   --  24  --  27   AST (SGOT) U/L  --   --  20  --  30   GLUCOSE mg/dL 109* 110* 105*  < > 96   < > = values in this interval not displayed.      Results from last 7 days  Lab Units 08/16/17  0349 08/15/17  0451 08/14/17  0334   INR  2.64* 1.14* 1.94*       RADIOLOGY:    Imaging Results (last 72 hours)     ** No results found for the last 72 hours. **          Assessment/Plan     Patient Active Problem List   Diagnosis Code   • PAF (paroxysmal atrial fibrillation) I48.0   • Hypertension I10   • Hyperlipidemia E78.5   • COPD (chronic obstructive pulmonary disease) J44.9   • Pain medication agreement Z79.899   • Hiatal hernia K44.9   • Primary osteoarthritis involving multiple joints M15.0   • Pulmonary hypertension I27.2   • S/P TVR (tricuspid valve repair) Z98.890   • Long term current use of anticoagulant Z79.01   • Pulmonary nodule R91.1   • Hemoptysis R04.2   • RLS (restless legs syndrome) G25.81   • Chronic low back pain M54.5, G89.29   • Dysplastic polyp of colon K63.5   • Lower GI bleed K92.2   • History of mitral valve replacement with tissue graft Z95.4   • Precordial pain R07.2   • Chronic respiratory failure with hypoxia J96.11   • Oral candidiasis B37.0   • VAN (acute kidney injury) N17.9        Impression:  #1 Lower GI bleeding: resolved. Hgb continues to drift down with no overt GI bleeding.  Onset in the postprocedural setting s/p colonoscopy with bleeding internal hemorrhoids. Congested mucosa in transverse colon and clips placed on 8/4/17  #2 anticoagulant therapy: Coumadin stopped 8/15 per cards with continued drop in Hgb  #3 Abdominal discomfort- SBFT with no obstructive process, evidence of esophagitis and mild stenosis,  #4 ANNA- oral iron therapy initiated     Recommendations:  - monitor H and H  - continue PPI  - monitor BM's   - EGD for further evaluation of esophageal abnormalities on upper GI  once INR amenable (significant increase in INR overnight).  Plans noted for FFP - would recommend giving early am in anticipation of EGD tomorrow later morning.          Rick Morales M.D.  [facility]  [address] [place] [address]  Office: [phone]

## 2017-08-17 ENCOUNTER — TELEPHONE (OUTPATIENT)
Dept: GASTROENTEROLOGY | Facility: CLINIC | Age: 62
End: 2017-08-17

## 2017-08-17 ENCOUNTER — ANESTHESIA EVENT (OUTPATIENT)
Dept: GASTROENTEROLOGY | Facility: HOSPITAL | Age: 62
End: 2017-08-17

## 2017-08-17 ENCOUNTER — TELEPHONE (OUTPATIENT)
Dept: FAMILY MEDICINE CLINIC | Facility: CLINIC | Age: 62
End: 2017-08-17

## 2017-08-17 ENCOUNTER — ANESTHESIA (OUTPATIENT)
Dept: GASTROENTEROLOGY | Facility: HOSPITAL | Age: 62
End: 2017-08-17

## 2017-08-17 LAB
ABO + RH BLD: NORMAL
ABO + RH BLD: NORMAL
ALBUMIN SERPL-MCNC: 4 G/DL (ref 3.5–5.2)
ALBUMIN/GLOB SERPL: 1.5 G/DL
ALP SERPL-CCNC: 98 U/L (ref 39–117)
ALT SERPL W P-5'-P-CCNC: 44 U/L (ref 1–33)
ANION GAP SERPL CALCULATED.3IONS-SCNC: 12 MMOL/L
APTT PPP: 48.8 SECONDS (ref 22.7–35.4)
AST SERPL-CCNC: 61 U/L (ref 1–32)
BASOPHILS # BLD AUTO: 0.01 10*3/MM3 (ref 0–0.2)
BASOPHILS NFR BLD AUTO: 0.2 % (ref 0–1.5)
BH BB BLOOD EXPIRATION DATE: NORMAL
BH BB BLOOD EXPIRATION DATE: NORMAL
BH BB BLOOD TYPE BARCODE: 6200
BH BB BLOOD TYPE BARCODE: 6200
BH BB DISPENSE STATUS: NORMAL
BH BB DISPENSE STATUS: NORMAL
BH BB PRODUCT CODE: NORMAL
BH BB PRODUCT CODE: NORMAL
BH BB UNIT NUMBER: NORMAL
BH BB UNIT NUMBER: NORMAL
BILIRUB SERPL-MCNC: 0.7 MG/DL (ref 0.1–1.2)
BUN BLD-MCNC: 23 MG/DL (ref 8–23)
BUN/CREAT SERPL: 23 (ref 7–25)
CALCIUM SPEC-SCNC: 8.7 MG/DL (ref 8.6–10.5)
CHLORIDE SERPL-SCNC: 102 MMOL/L (ref 98–107)
CO2 SERPL-SCNC: 28 MMOL/L (ref 22–29)
CREAT BLD-MCNC: 1 MG/DL (ref 0.57–1)
DEPRECATED RDW RBC AUTO: 47.5 FL (ref 37–54)
EOSINOPHIL # BLD AUTO: 0.1 10*3/MM3 (ref 0–0.7)
EOSINOPHIL NFR BLD AUTO: 1.8 % (ref 0.3–6.2)
ERYTHROCYTE [DISTWIDTH] IN BLOOD BY AUTOMATED COUNT: 14.6 % (ref 11.7–13)
GFR SERPL CREATININE-BSD FRML MDRD: 56 ML/MIN/1.73
GLOBULIN UR ELPH-MCNC: 2.7 GM/DL
GLUCOSE BLD-MCNC: 107 MG/DL (ref 65–99)
HCT VFR BLD AUTO: 27.9 % (ref 35.6–45.5)
HGB BLD-MCNC: 8.6 G/DL (ref 11.9–15.5)
IMM GRANULOCYTES # BLD: 0 10*3/MM3 (ref 0–0.03)
IMM GRANULOCYTES NFR BLD: 0 % (ref 0–0.5)
INR PPP: 1.81 (ref 0.9–1.1)
LYMPHOCYTES # BLD AUTO: 0.54 10*3/MM3 (ref 0.9–4.8)
LYMPHOCYTES NFR BLD AUTO: 9.7 % (ref 19.6–45.3)
MCH RBC QN AUTO: 27.4 PG (ref 26.9–32)
MCHC RBC AUTO-ENTMCNC: 30.8 G/DL (ref 32.4–36.3)
MCV RBC AUTO: 88.9 FL (ref 80.5–98.2)
MONOCYTES # BLD AUTO: 0.32 10*3/MM3 (ref 0.2–1.2)
MONOCYTES NFR BLD AUTO: 5.8 % (ref 5–12)
NEUTROPHILS # BLD AUTO: 4.58 10*3/MM3 (ref 1.9–8.1)
NEUTROPHILS NFR BLD AUTO: 82.5 % (ref 42.7–76)
PLATELET # BLD AUTO: 165 10*3/MM3 (ref 140–500)
PMV BLD AUTO: 11 FL (ref 6–12)
POTASSIUM BLD-SCNC: 3.8 MMOL/L (ref 3.5–5.2)
PROT SERPL-MCNC: 6.7 G/DL (ref 6–8.5)
PROTHROMBIN TIME: 20.4 SECONDS (ref 11.7–14.2)
RBC # BLD AUTO: 3.14 10*6/MM3 (ref 3.9–5.2)
SODIUM BLD-SCNC: 142 MMOL/L (ref 136–145)
UNIT  ABO: NORMAL
UNIT  ABO: NORMAL
UNIT  RH: NORMAL
UNIT  RH: NORMAL
WBC NRBC COR # BLD: 5.55 10*3/MM3 (ref 4.5–10.7)

## 2017-08-17 PROCEDURE — 94799 UNLISTED PULMONARY SVC/PX: CPT

## 2017-08-17 PROCEDURE — 43235 EGD DIAGNOSTIC BRUSH WASH: CPT | Performed by: INTERNAL MEDICINE

## 2017-08-17 PROCEDURE — 85025 COMPLETE CBC W/AUTO DIFF WBC: CPT | Performed by: INTERNAL MEDICINE

## 2017-08-17 PROCEDURE — 36430 TRANSFUSION BLD/BLD COMPNT: CPT

## 2017-08-17 PROCEDURE — 25010000002 PROPOFOL 10 MG/ML EMULSION: Performed by: ANESTHESIOLOGY

## 2017-08-17 PROCEDURE — 99233 SBSQ HOSP IP/OBS HIGH 50: CPT | Performed by: INTERNAL MEDICINE

## 2017-08-17 PROCEDURE — 85610 PROTHROMBIN TIME: CPT | Performed by: INTERNAL MEDICINE

## 2017-08-17 PROCEDURE — P9017 PLASMA 1 DONOR FRZ W/IN 8 HR: HCPCS

## 2017-08-17 PROCEDURE — 25010000002 ONDANSETRON PER 1 MG: Performed by: INTERNAL MEDICINE

## 2017-08-17 PROCEDURE — 25010000002 FUROSEMIDE PER 20 MG: Performed by: INTERNAL MEDICINE

## 2017-08-17 PROCEDURE — 85730 THROMBOPLASTIN TIME PARTIAL: CPT | Performed by: INTERNAL MEDICINE

## 2017-08-17 PROCEDURE — 0DJ08ZZ INSPECTION OF UPPER INTESTINAL TRACT, VIA NATURAL OR ARTIFICIAL OPENING ENDOSCOPIC: ICD-10-PCS | Performed by: INTERNAL MEDICINE

## 2017-08-17 PROCEDURE — 25010000002 MORPHINE SULFATE (PF) 2 MG/ML SOLUTION: Performed by: INTERNAL MEDICINE

## 2017-08-17 PROCEDURE — 86927 PLASMA FRESH FROZEN: CPT

## 2017-08-17 PROCEDURE — 80053 COMPREHEN METABOLIC PANEL: CPT | Performed by: INTERNAL MEDICINE

## 2017-08-17 RX ORDER — ATORVASTATIN CALCIUM 40 MG/1
40 TABLET, FILM COATED ORAL DAILY
Qty: 30 TABLET | Refills: 3 | Status: SHIPPED | OUTPATIENT
Start: 2017-08-17 | End: 2017-09-27 | Stop reason: SDUPTHER

## 2017-08-17 RX ORDER — PROPOFOL 10 MG/ML
VIAL (ML) INTRAVENOUS AS NEEDED
Status: DISCONTINUED | OUTPATIENT
Start: 2017-08-17 | End: 2017-08-17 | Stop reason: SURG

## 2017-08-17 RX ORDER — SUCRALFATE ORAL 1 G/10ML
1 SUSPENSION ORAL EVERY 6 HOURS SCHEDULED
Status: DISCONTINUED | OUTPATIENT
Start: 2017-08-17 | End: 2017-08-24

## 2017-08-17 RX ORDER — FUROSEMIDE 10 MG/ML
40 INJECTION INTRAMUSCULAR; INTRAVENOUS ONCE
Status: COMPLETED | OUTPATIENT
Start: 2017-08-17 | End: 2017-08-17

## 2017-08-17 RX ORDER — LIDOCAINE HYDROCHLORIDE 20 MG/ML
INJECTION, SOLUTION INFILTRATION; PERINEURAL AS NEEDED
Status: DISCONTINUED | OUTPATIENT
Start: 2017-08-17 | End: 2017-08-17 | Stop reason: SURG

## 2017-08-17 RX ORDER — SODIUM CHLORIDE 9 MG/ML
30 INJECTION, SOLUTION INTRAVENOUS CONTINUOUS PRN
Status: DISCONTINUED | OUTPATIENT
Start: 2017-08-17 | End: 2017-08-25 | Stop reason: HOSPADM

## 2017-08-17 RX ORDER — SODIUM CHLORIDE 0.9 % (FLUSH) 0.9 %
1-10 SYRINGE (ML) INJECTION AS NEEDED
Status: DISCONTINUED | OUTPATIENT
Start: 2017-08-17 | End: 2017-08-17

## 2017-08-17 RX ORDER — IPRATROPIUM BROMIDE AND ALBUTEROL SULFATE 2.5; .5 MG/3ML; MG/3ML
3 SOLUTION RESPIRATORY (INHALATION) ONCE AS NEEDED
Status: COMPLETED | OUTPATIENT
Start: 2017-08-17 | End: 2017-08-17

## 2017-08-17 RX ORDER — ONDANSETRON 2 MG/ML
4 INJECTION INTRAMUSCULAR; INTRAVENOUS ONCE AS NEEDED
Status: DISCONTINUED | OUTPATIENT
Start: 2017-08-17 | End: 2017-08-17

## 2017-08-17 RX ADMIN — HYDROCODONE BITARTRATE AND ACETAMINOPHEN 1 TABLET: 5; 325 TABLET ORAL at 08:16

## 2017-08-17 RX ADMIN — NYSTATIN 500000 UNITS: 100000 SUSPENSION ORAL at 14:21

## 2017-08-17 RX ADMIN — HYDROCODONE BITARTRATE AND ACETAMINOPHEN 1 TABLET: 5; 325 TABLET ORAL at 17:10

## 2017-08-17 RX ADMIN — SERTRALINE 100 MG: 100 TABLET, FILM COATED ORAL at 08:06

## 2017-08-17 RX ADMIN — FERROUS SULFATE TAB 325 MG (65 MG ELEMENTAL FE) 325 MG: 325 (65 FE) TAB at 17:10

## 2017-08-17 RX ADMIN — SODIUM CHLORIDE 30 ML/HR: 9 INJECTION, SOLUTION INTRAVENOUS at 10:59

## 2017-08-17 RX ADMIN — ALBUTEROL SULFATE 2.5 MG: 2.5 SOLUTION RESPIRATORY (INHALATION) at 05:52

## 2017-08-17 RX ADMIN — PANTOPRAZOLE SODIUM 40 MG: 40 TABLET, DELAYED RELEASE ORAL at 05:20

## 2017-08-17 RX ADMIN — ROPINIROLE 2 MG: 2 TABLET, FILM COATED ORAL at 20:45

## 2017-08-17 RX ADMIN — PROPOFOL 150 MG: 10 INJECTION, EMULSION INTRAVENOUS at 12:31

## 2017-08-17 RX ADMIN — ONDANSETRON 4 MG: 2 INJECTION INTRAMUSCULAR; INTRAVENOUS at 22:32

## 2017-08-17 RX ADMIN — HYDROCODONE BITARTRATE AND ACETAMINOPHEN 1 TABLET: 5; 325 TABLET ORAL at 21:44

## 2017-08-17 RX ADMIN — CARVEDILOL 12.5 MG: 12.5 TABLET, FILM COATED ORAL at 17:10

## 2017-08-17 RX ADMIN — NYSTATIN 500000 UNITS: 100000 SUSPENSION ORAL at 20:45

## 2017-08-17 RX ADMIN — TIOTROPIUM BROMIDE 1 CAPSULE: 18 CAPSULE ORAL; RESPIRATORY (INHALATION) at 08:25

## 2017-08-17 RX ADMIN — ATORVASTATIN CALCIUM 40 MG: 20 TABLET, FILM COATED ORAL at 08:06

## 2017-08-17 RX ADMIN — MORPHINE SULFATE 2 MG: 2 INJECTION, SOLUTION INTRAMUSCULAR; INTRAVENOUS at 14:21

## 2017-08-17 RX ADMIN — MORPHINE SULFATE 2 MG: 2 INJECTION, SOLUTION INTRAMUSCULAR; INTRAVENOUS at 04:06

## 2017-08-17 RX ADMIN — CETIRIZINE HYDROCHLORIDE 10 MG: 10 TABLET, FILM COATED ORAL at 08:06

## 2017-08-17 RX ADMIN — SUCRALFATE 1 G: 1 SUSPENSION ORAL at 19:25

## 2017-08-17 RX ADMIN — ALBUTEROL SULFATE 2.5 MG: 2.5 SOLUTION RESPIRATORY (INHALATION) at 19:52

## 2017-08-17 RX ADMIN — BUDESONIDE AND FORMOTEROL FUMARATE DIHYDRATE 2 PUFF: 160; 4.5 AEROSOL RESPIRATORY (INHALATION) at 19:59

## 2017-08-17 RX ADMIN — MULTIPLE VITAMINS W/ MINERALS TAB 1 TABLET: TAB at 08:06

## 2017-08-17 RX ADMIN — CYCLOBENZAPRINE HYDROCHLORIDE 10 MG: 10 TABLET, FILM COATED ORAL at 20:45

## 2017-08-17 RX ADMIN — FERROUS SULFATE TAB 325 MG (65 MG ELEMENTAL FE) 325 MG: 325 (65 FE) TAB at 08:07

## 2017-08-17 RX ADMIN — HYDROCORTISONE ACETATE 25 MG: 25 SUPPOSITORY RECTAL at 19:25

## 2017-08-17 RX ADMIN — FUROSEMIDE 40 MG: 10 INJECTION, SOLUTION INTRAMUSCULAR; INTRAVENOUS at 08:07

## 2017-08-17 RX ADMIN — NYSTATIN 500000 UNITS: 100000 SUSPENSION ORAL at 17:10

## 2017-08-17 RX ADMIN — BUDESONIDE AND FORMOTEROL FUMARATE DIHYDRATE 2 PUFF: 160; 4.5 AEROSOL RESPIRATORY (INHALATION) at 08:24

## 2017-08-17 RX ADMIN — CARVEDILOL 12.5 MG: 12.5 TABLET, FILM COATED ORAL at 08:07

## 2017-08-17 RX ADMIN — SUCRALFATE 1 G: 1 SUSPENSION ORAL at 23:51

## 2017-08-17 RX ADMIN — MORPHINE SULFATE 2 MG: 2 INJECTION, SOLUTION INTRAMUSCULAR; INTRAVENOUS at 19:27

## 2017-08-17 RX ADMIN — MORPHINE SULFATE 2 MG: 2 INJECTION, SOLUTION INTRAMUSCULAR; INTRAVENOUS at 09:28

## 2017-08-17 RX ADMIN — LIDOCAINE HYDROCHLORIDE 100 MG: 20 INJECTION, SOLUTION INFILTRATION; PERINEURAL at 12:31

## 2017-08-17 RX ADMIN — NYSTATIN 500000 UNITS: 100000 SUSPENSION ORAL at 08:07

## 2017-08-17 RX ADMIN — IPRATROPIUM BROMIDE AND ALBUTEROL SULFATE 3 ML: .5; 3 SOLUTION RESPIRATORY (INHALATION) at 13:01

## 2017-08-17 NOTE — ANESTHESIA POSTPROCEDURE EVALUATION
Patient: Paz Browne    Procedure Summary     Date Anesthesia Start Anesthesia Stop Room / Location    08/17/17 1224 1245  [facility] /  [facility]       Procedure Diagnosis Surgeon Provider    ESOPHAGOGASTRODUODENOSCOPY (N/A Esophagus) Anemia, unspecified type  (Anemia, unspecified type [D64.9]) MD Gayathri Browne MD          Anesthesia Type: MAC  Last vitals  BP   146/68 (08/17/17 1329)    Temp   36.9 °C (98.4 °F) (08/17/17 1329)    Pulse   59 (08/17/17 1329)   Resp   16 (08/17/17 1329)    SpO2   94 % (08/17/17 1329)      Post Anesthesia Care and Evaluation    Patient location during evaluation: bedside  Patient participation: complete - patient participated  Level of consciousness: awake  Pain management: adequate  Anesthetic complications: No anesthetic complications    Cardiovascular status: acceptable  Respiratory status: acceptable  Hydration status: acceptable

## 2017-08-17 NOTE — PROGRESS NOTES
Name: Paz Browne ADMIT: 2017   : 1955  PCP: Javan Martinez MD    MRN: [id number] LOS: 8 days   AGE/SEX: 62 y.o. female  ROOM: ENDO/ENDO   Subjective   Subjective  Saw after EGD. Reports left sided abdominal pain. No CP SOA NVD.    Objective   Vital Signs  Temp:  [97.6 °F (36.4 °C)-99.7 °F (37.6 °C)] 98.6 °F (37 °C)  Heart Rate:  [53-83] 60  Resp:  [12-20] 16  BP: (111-159)/() 125/82  SpO2:  [91 %-100 %] 92 %  on  Flow (L/min):  [3] 3;   O2 Device: nasal cannula with humidification  Body mass index is 26 kg/(m^2).    Physical Exam   Constitutional: She appears well-developed and well-nourished. No distress.   HENT:   Head: Normocephalic and atraumatic.   Eyes: EOM are normal. Pupils are equal, round, and reactive to light.   Neck: Normal range of motion. Neck supple.   Cardiovascular: Normal rate, regular rhythm and intact distal pulses.    Pulmonary/Chest: Effort normal and breath sounds normal. She has no wheezes.   Abdominal: Soft. There is no tenderness. There is no rebound and no guarding.   Musculoskeletal: Normal range of motion. She exhibits no edema.   Neurological: She is alert. No cranial nerve deficit.   Skin: Skin is warm and dry. She is not diaphoretic.   Psychiatric: She has a normal mood and affect. Her behavior is normal.   Nursing note and vitals reviewed.      Results Review:       I reviewed the patient's new clinical results. Reviewed other test results, agree with interpretation.    Results from last 7 days  Lab Units 17  0743 17  0349 08/15/17  0451 17  0334 17  0449 17  0410 17  1540 17  0601   WBC 10*3/mm3 5.55 7.41 7.40 8.06 6.89 7.53  --  5.37   HEMOGLOBIN g/dL 8.6* 8.8* 8.0* 8.6* 9.3* 8.9* 9.3* 9.0*   PLATELETS 10*3/mm3 165 176 173 197 187 189  --  181     Results from last 7 days  Lab Units 17  0743 17  0349 08/15/17  0451 17  0449 17  0410 17  0601   SODIUM mmol/L 142 133* 135* 141 140  141   POTASSIUM mmol/L 3.8 4.1 4.0 4.0 4.5 4.1   CHLORIDE mmol/L 102 95* 92* 99 100 102   CO2 mmol/L 28.0 26.1 27.6 28.2 28.1 30.1*   BUN mg/dL 23 41* 38* 23 17 13   CREATININE mg/dL 1.00 1.70* 2.15* 1.27* 1.21* 0.88   GLUCOSE mg/dL 107* 109* 110* 105* 109* 106*   Estimated Creatinine Clearance: 60.2 mL/min (by C-G formula based on Cr of 1).  Results from last 7 days  Lab Units 08/17/17  0743 08/16/17  1201 08/16/17  0349 08/15/17  0451 08/13/17  0449 08/12/17  0410 08/11/17  0601   CALCIUM mg/dL 8.7  --  8.5* 8.6 9.6 9.2 9.0   ALBUMIN g/dL 4.00 3.80  --   --  4.10  --   --          atorvastatin 40 mg Oral Daily   budesonide-formoterol 2 puff Inhalation BID - RT   carvedilol 12.5 mg Oral BID With Meals   cetirizine 10 mg Oral Daily   cyclobenzaprine 10 mg Oral Nightly   ferrous sulfate 325 mg Oral BID With Meals   hydrocortisone 25 mg Rectal BID   multivitamin with minerals 1 tablet Oral Daily   nystatin 5 mL Oral 4x Daily   pantoprazole 40 mg Oral QAM   polyethylene glycol 17 g Oral Daily   rOPINIRole 2 mg Oral Nightly   sertraline 100 mg Oral Daily   tiotropium 1 capsule Inhalation Daily - RT       sodium chloride 50 mL/hr Last Rate: 50 mL/hr (08/16/17 2101)   sodium chloride 30 mL/hr    NPO Diet      Assessment/Plan   Assessment:     Active Hospital Problems (** Indicates Principal Problem)    Diagnosis Date Noted   • **Anemia [D64.9] 08/09/2017   • VAN (acute kidney injury) [N17.9] 08/15/2017   • Oral candidiasis [B37.0] 08/14/2017   • Chronic respiratory failure with hypoxia [J96.11] 08/13/2017   • History of mitral valve replacement with tissue graft [Z95.4] 08/11/2017   • Precordial pain [R07.2] 08/11/2017   • Lower GI bleed [K92.2] 08/09/2017   • Long term current use of anticoagulant [Z79.01] 10/13/2016   • Pulmonary hypertension [I27.2]    • Hypertension [I10]    • COPD (chronic obstructive pulmonary disease) [J44.9]    • PAF (paroxysmal atrial fibrillation) [I48.0] 01/25/2016      [facility]  Problems    Diagnosis Date Noted Date Resolved   No resolved problems to display.       Plan:   - Anemia: GI losses. CScope with clipped bleeding hemorrhoid. SBFT with stenosis and esophagitis. S/p PRBC and FFP transfusions. EGD with gastritis and she did not require esophageal dilation. Carafate and diet added. Will stop IVF. Monitor Hgb.  - PAF: Rate control. AC held. Cardiology following.   - Chronic Hypoxic Resp Failure/COPD/Pulm HTN: On home 3L. Lasix resumed today.  - VAN: Cr improving. Continue to monitor off ACE.    Disposition  TBD/few days    Nick Tovar MD  [facility]  08/17/17  10:50 AM

## 2017-08-17 NOTE — PLAN OF CARE
Problem: GI Endoscopy (Adult)  Goal: Signs and Symptoms of Listed Potential Problems Will be Absent or Manageable (GI Endoscopy)  Outcome: Ongoing (interventions implemented as appropriate)    08/17/17 1007   GI Endoscopy   Problems Assessed (GI Endoscopy) all   Problems Present (GI Endoscopy) none

## 2017-08-17 NOTE — ANESTHESIA PREPROCEDURE EVALUATION
Anesthesia Evaluation     Patient summary reviewed and Nursing notes reviewed   NPO Solid Status: > 8 hours       Airway   Mallampati: I  TM distance: <3 FB  Neck ROM: full  Dental    (+) edentulous    Pulmonary - normal exam   (+) COPD (Chronic respiratory failure with hypoxia) severe,   Cardiovascular - normal exam    ECG reviewed  PT is on anticoagulation therapy  Patient on routine beta blocker    (+) hypertension, valvular problems/murmurs (History of mitral valve replacement with tissue graft) MR, dysrhythmias Atrial Fib, Atrial Flutter, CHF (Pulmonary hypertension), hyperlipidemia      Neuro/Psych- negative ROS  GI/Hepatic/Renal/Endo    (+)  hiatal hernia, renal disease (VAN (acute kidney injury)) ARF,     Musculoskeletal     Abdominal  - normal exam    Bowel sounds: normal.   Substance History - negative use     OB/GYN negative ob/gyn ROS         Other   (+) arthritis                                   Anesthesia Plan    ASA 4     MAC     Anesthetic plan and risks discussed with patient.

## 2017-08-17 NOTE — PROGRESS NOTES
"CC: PAF/MVR    Interval History:       Vital Signs  Temp:  [97.6 °F (36.4 °C)-99.7 °F (37.6 °C)] 98 °F (36.7 °C)  Heart Rate:  [53-83] 65  Resp:  [12-20] 18  BP: (111-159)/() 159/94    Intake/Output Summary (Last 24 hours) at 08/17/17 0729  Last data filed at 08/17/17 0552   Gross per 24 hour   Intake             2522 ml   Output             2625 ml   Net             -103 ml     Flowsheet Rows         First Filed Value    Admission Height  67\" (170.2 cm) Documented at 08/09/2017 1152    Admission Weight  166 lb (75.3 kg) Documented at 08/09/2017 1152          PHYSICAL EXAM:  General: No acute distress  Resp:NL Rate, unlabored, Diffuse coarse rales diminished sounds especially bases  CV:NL rate and rhythm, NL PMI, Nl S1 and S2, 2/6 systolic Mumur, no gallop, no rub, No JVD. Normal pedal pulses  ABD:Nl sounds, no masses or tenderness, nondistended, no quarding or rebound  Neuro: alert,cooperative and oriented  Extr: No edema or cyanosis, moves all extremities    Results Review:      Results from last 7 days  Lab Units 08/16/17  0349   SODIUM mmol/L 133*   POTASSIUM mmol/L 4.1   CHLORIDE mmol/L 95*   CO2 mmol/L 26.1   BUN mg/dL 41*   CREATININE mg/dL 1.70*   GLUCOSE mg/dL 109*   CALCIUM mg/dL 8.5*           Results from last 7 days  Lab Units 08/16/17  0349   WBC 10*3/mm3 7.41   HEMOGLOBIN g/dL 8.8*   HEMATOCRIT % 28.1*   PLATELETS 10*3/mm3 176       Results from last 7 days  Lab Units 08/16/17  1201 08/16/17  0349 08/15/17  0451   INR  2.66* 2.64* 1.14*                 @LABNT(bnp)@  I reviewed the patient's new clinical results.  I personally viewed and interpreted the patient's EKG/Telemetry data        Medication Review:   Meds reviewed      sodium chloride 50 mL/hr Last Rate: 50 mL/hr (08/16/17 2101)       Assessment/Plan  1.  Lower GI bleed.  Status post polypectomy. Upper GI possible esophageal stricture. Received blood yesterday.Plan apparently for upper endoscopy today  2.  Atrial fibrillation/flutter. "  Status post atrial appendage closure and cryo-maze procedure no documented recurrence since her cardioversion in 2014.  We'll maintain her off warfarin for now INR still 2.7  3.  History of severe mitral and tricuspid insufficiency status post mitral replacement with tissue valve and tricuspid valve repair.  4.  Marked pulmonary hypertension.  Not a transplant candidate follows with pulmonary.  5.  History of hypertension blood pressure adequately controlled.  6.  Renal insufficiency improved  8.  Hyperlipidemia.  9. She appears volume overloaded will give IV diuretic today        Earnest Gan MD  08/17/17  7:29 AM

## 2017-08-17 NOTE — PROGRESS NOTES
Continued Stay Note  [facility]     Patient Name: Paz Browne  MRN: [id number]  Today's Date: 8/17/2017    Admit Date: 8/9/2017          Discharge Plan       08/17/17 7092    Case Management/Social Work Plan    Plan Home    Patient/Family In Agreement With Plan yes    Additional Comments Spoke with pt and  at bedside.  Pt states she has no needs.  Has O2 at home with [facility].   and caregiver (Felicia) can assist if needed. BC Meza RN              Discharge Codes     None        Expected Discharge Date and Time     Expected Discharge Date Expected Discharge Time    Aug 18, 2017             Beverly Meza

## 2017-08-17 NOTE — PLAN OF CARE
Problem: Patient Care Overview (Adult)  Goal: Plan of Care Review  Outcome: Ongoing (interventions implemented as appropriate)    08/17/17 0234 08/17/17 1945   Coping/Psychosocial Response Interventions   Plan Of Care Reviewed With patient --    Patient Care Overview   Progress progress towards functional goals is fair --    Outcome Evaluation   Outcome Summary/Follow up Plan --  VSS, c/o abdominal pain, started on Carafate tonight post EGD and receiving PRN Morphine and Lortab, SOA and wheezing with activity, schedules and PRn breathing tx ordered.,         Problem: Fall Risk (Adult)  Goal: Identify Related Risk Factors and Signs and Symptoms  Outcome: Ongoing (interventions implemented as appropriate)  Goal: Absence of Falls  Outcome: Ongoing (interventions implemented as appropriate)    Problem: Pain, Acute (Adult)  Goal: Identify Related Risk Factors and Signs and Symptoms  Outcome: Ongoing (interventions implemented as appropriate)  Goal: Acceptable Pain Control/Comfort Level  Outcome: Ongoing (interventions implemented as appropriate)    Problem: GI Endoscopy (Adult)  Goal: Signs and Symptoms of Listed Potential Problems Will be Absent or Manageable (GI Endoscopy)  Outcome: Ongoing (interventions implemented as appropriate)

## 2017-08-17 NOTE — PLAN OF CARE
Problem: Patient Care Overview (Adult)  Goal: Plan of Care Review  Outcome: Ongoing (interventions implemented as appropriate)    08/17/17 0234   Coping/Psychosocial Response Interventions   Plan Of Care Reviewed With patient   Patient Care Overview   Progress progress towards functional goals is fair   Outcome Evaluation   Outcome Summary/Follow up Plan VSS. Plan for EGD today. SOA, decreased fluids and IV lasix given. Morphine for pain.         Problem: Fall Risk (Adult)  Goal: Identify Related Risk Factors and Signs and Symptoms  Outcome: Ongoing (interventions implemented as appropriate)  Goal: Absence of Falls  Outcome: Ongoing (interventions implemented as appropriate)

## 2017-08-18 ENCOUNTER — APPOINTMENT (OUTPATIENT)
Dept: GENERAL RADIOLOGY | Facility: HOSPITAL | Age: 62
End: 2017-08-18
Attending: INTERNAL MEDICINE

## 2017-08-18 LAB
ANION GAP SERPL CALCULATED.3IONS-SCNC: 13.7 MMOL/L
BASOPHILS # BLD AUTO: 0.02 10*3/MM3 (ref 0–0.2)
BASOPHILS NFR BLD AUTO: 0.3 % (ref 0–1.5)
BUN BLD-MCNC: 17 MG/DL (ref 8–23)
BUN/CREAT SERPL: 19.5 (ref 7–25)
CALCIUM SPEC-SCNC: 9 MG/DL (ref 8.6–10.5)
CHLORIDE SERPL-SCNC: 101 MMOL/L (ref 98–107)
CO2 SERPL-SCNC: 25.3 MMOL/L (ref 22–29)
CREAT BLD-MCNC: 0.87 MG/DL (ref 0.57–1)
DEPRECATED RDW RBC AUTO: 47.6 FL (ref 37–54)
EOSINOPHIL # BLD AUTO: 0.16 10*3/MM3 (ref 0–0.7)
EOSINOPHIL NFR BLD AUTO: 2.7 % (ref 0.3–6.2)
ERYTHROCYTE [DISTWIDTH] IN BLOOD BY AUTOMATED COUNT: 14.4 % (ref 11.7–13)
GFR SERPL CREATININE-BSD FRML MDRD: 66 ML/MIN/1.73
GLUCOSE BLD-MCNC: 105 MG/DL (ref 65–99)
HCT VFR BLD AUTO: 28.5 % (ref 35.6–45.5)
HGB BLD-MCNC: 8.6 G/DL (ref 11.9–15.5)
IMM GRANULOCYTES # BLD: 0 10*3/MM3 (ref 0–0.03)
IMM GRANULOCYTES NFR BLD: 0 % (ref 0–0.5)
INR PPP: 2.11 (ref 0.9–1.1)
LYMPHOCYTES # BLD AUTO: 0.88 10*3/MM3 (ref 0.9–4.8)
LYMPHOCYTES NFR BLD AUTO: 14.7 % (ref 19.6–45.3)
MCH RBC QN AUTO: 27.2 PG (ref 26.9–32)
MCHC RBC AUTO-ENTMCNC: 30.2 G/DL (ref 32.4–36.3)
MCV RBC AUTO: 90.2 FL (ref 80.5–98.2)
MONOCYTES # BLD AUTO: 0.36 10*3/MM3 (ref 0.2–1.2)
MONOCYTES NFR BLD AUTO: 6 % (ref 5–12)
NEUTROPHILS # BLD AUTO: 4.57 10*3/MM3 (ref 1.9–8.1)
NEUTROPHILS NFR BLD AUTO: 76.3 % (ref 42.7–76)
PLATELET # BLD AUTO: 181 10*3/MM3 (ref 140–500)
PMV BLD AUTO: 11.1 FL (ref 6–12)
POTASSIUM BLD-SCNC: 3.8 MMOL/L (ref 3.5–5.2)
PROTHROMBIN TIME: 22.9 SECONDS (ref 11.7–14.2)
RBC # BLD AUTO: 3.16 10*6/MM3 (ref 3.9–5.2)
SODIUM BLD-SCNC: 140 MMOL/L (ref 136–145)
WBC NRBC COR # BLD: 5.99 10*3/MM3 (ref 4.5–10.7)

## 2017-08-18 PROCEDURE — 99232 SBSQ HOSP IP/OBS MODERATE 35: CPT | Performed by: INTERNAL MEDICINE

## 2017-08-18 PROCEDURE — 25010000002 MORPHINE SULFATE (PF) 2 MG/ML SOLUTION: Performed by: INTERNAL MEDICINE

## 2017-08-18 PROCEDURE — 71020 HC CHEST PA AND LATERAL: CPT

## 2017-08-18 PROCEDURE — 85025 COMPLETE CBC W/AUTO DIFF WBC: CPT | Performed by: INTERNAL MEDICINE

## 2017-08-18 PROCEDURE — 94799 UNLISTED PULMONARY SVC/PX: CPT

## 2017-08-18 PROCEDURE — 85610 PROTHROMBIN TIME: CPT | Performed by: INTERNAL MEDICINE

## 2017-08-18 PROCEDURE — 99233 SBSQ HOSP IP/OBS HIGH 50: CPT | Performed by: INTERNAL MEDICINE

## 2017-08-18 PROCEDURE — 80048 BASIC METABOLIC PNL TOTAL CA: CPT | Performed by: INTERNAL MEDICINE

## 2017-08-18 RX ORDER — FUROSEMIDE 40 MG/1
40 TABLET ORAL 2 TIMES DAILY
Status: DISCONTINUED | OUTPATIENT
Start: 2017-08-18 | End: 2017-08-23

## 2017-08-18 RX ORDER — ALBUTEROL SULFATE 2.5 MG/3ML
2.5 SOLUTION RESPIRATORY (INHALATION) EVERY 4 HOURS PRN
Status: DISCONTINUED | OUTPATIENT
Start: 2017-08-18 | End: 2017-08-25 | Stop reason: HOSPADM

## 2017-08-18 RX ORDER — IPRATROPIUM BROMIDE AND ALBUTEROL SULFATE 2.5; .5 MG/3ML; MG/3ML
3 SOLUTION RESPIRATORY (INHALATION)
Status: DISCONTINUED | OUTPATIENT
Start: 2017-08-18 | End: 2017-08-19

## 2017-08-18 RX ADMIN — IPRATROPIUM BROMIDE AND ALBUTEROL SULFATE 3 ML: .5; 3 SOLUTION RESPIRATORY (INHALATION) at 20:29

## 2017-08-18 RX ADMIN — FUROSEMIDE 40 MG: 40 TABLET ORAL at 08:04

## 2017-08-18 RX ADMIN — MORPHINE SULFATE 2 MG: 2 INJECTION, SOLUTION INTRAMUSCULAR; INTRAVENOUS at 13:45

## 2017-08-18 RX ADMIN — SUCRALFATE 1 G: 1 SUSPENSION ORAL at 13:45

## 2017-08-18 RX ADMIN — IPRATROPIUM BROMIDE AND ALBUTEROL SULFATE 3 ML: .5; 3 SOLUTION RESPIRATORY (INHALATION) at 12:20

## 2017-08-18 RX ADMIN — PANTOPRAZOLE SODIUM 40 MG: 40 TABLET, DELAYED RELEASE ORAL at 06:00

## 2017-08-18 RX ADMIN — CARVEDILOL 12.5 MG: 12.5 TABLET, FILM COATED ORAL at 17:50

## 2017-08-18 RX ADMIN — CYCLOBENZAPRINE HYDROCHLORIDE 10 MG: 10 TABLET, FILM COATED ORAL at 21:07

## 2017-08-18 RX ADMIN — MORPHINE SULFATE 2 MG: 2 INJECTION, SOLUTION INTRAMUSCULAR; INTRAVENOUS at 17:50

## 2017-08-18 RX ADMIN — IPRATROPIUM BROMIDE AND ALBUTEROL SULFATE 3 ML: .5; 3 SOLUTION RESPIRATORY (INHALATION) at 15:12

## 2017-08-18 RX ADMIN — SERTRALINE 100 MG: 100 TABLET, FILM COATED ORAL at 08:04

## 2017-08-18 RX ADMIN — HYDROCODONE BITARTRATE AND ACETAMINOPHEN 1 TABLET: 5; 325 TABLET ORAL at 10:53

## 2017-08-18 RX ADMIN — SUCRALFATE 1 G: 1 SUSPENSION ORAL at 17:50

## 2017-08-18 RX ADMIN — MORPHINE SULFATE 2 MG: 2 INJECTION, SOLUTION INTRAMUSCULAR; INTRAVENOUS at 22:11

## 2017-08-18 RX ADMIN — HYDROCODONE BITARTRATE AND ACETAMINOPHEN 1 TABLET: 5; 325 TABLET ORAL at 16:12

## 2017-08-18 RX ADMIN — HYDROCODONE BITARTRATE AND ACETAMINOPHEN 1 TABLET: 5; 325 TABLET ORAL at 02:28

## 2017-08-18 RX ADMIN — SUCRALFATE 1 G: 1 SUSPENSION ORAL at 06:00

## 2017-08-18 RX ADMIN — BUDESONIDE AND FORMOTEROL FUMARATE DIHYDRATE 2 PUFF: 160; 4.5 AEROSOL RESPIRATORY (INHALATION) at 20:35

## 2017-08-18 RX ADMIN — NYSTATIN 500000 UNITS: 100000 SUSPENSION ORAL at 21:07

## 2017-08-18 RX ADMIN — NYSTATIN 500000 UNITS: 100000 SUSPENSION ORAL at 17:50

## 2017-08-18 RX ADMIN — FERROUS SULFATE TAB 325 MG (65 MG ELEMENTAL FE) 325 MG: 325 (65 FE) TAB at 08:04

## 2017-08-18 RX ADMIN — NYSTATIN 500000 UNITS: 100000 SUSPENSION ORAL at 08:04

## 2017-08-18 RX ADMIN — SUCRALFATE 1 G: 1 SUSPENSION ORAL at 22:12

## 2017-08-18 RX ADMIN — BUDESONIDE AND FORMOTEROL FUMARATE DIHYDRATE 2 PUFF: 160; 4.5 AEROSOL RESPIRATORY (INHALATION) at 06:32

## 2017-08-18 RX ADMIN — TEMAZEPAM 30 MG: 15 CAPSULE ORAL at 22:11

## 2017-08-18 RX ADMIN — FUROSEMIDE 40 MG: 40 TABLET ORAL at 19:31

## 2017-08-18 RX ADMIN — HYDROCODONE BITARTRATE AND ACETAMINOPHEN 1 TABLET: 5; 325 TABLET ORAL at 21:06

## 2017-08-18 RX ADMIN — CETIRIZINE HYDROCHLORIDE 10 MG: 10 TABLET, FILM COATED ORAL at 08:04

## 2017-08-18 RX ADMIN — ROPINIROLE 2 MG: 2 TABLET, FILM COATED ORAL at 21:07

## 2017-08-18 RX ADMIN — ALBUTEROL SULFATE 2.5 MG: 2.5 SOLUTION RESPIRATORY (INHALATION) at 06:31

## 2017-08-18 RX ADMIN — ATORVASTATIN CALCIUM 40 MG: 20 TABLET, FILM COATED ORAL at 08:04

## 2017-08-18 RX ADMIN — TIOTROPIUM BROMIDE 1 CAPSULE: 18 CAPSULE ORAL; RESPIRATORY (INHALATION) at 06:31

## 2017-08-18 RX ADMIN — CARVEDILOL 12.5 MG: 12.5 TABLET, FILM COATED ORAL at 08:04

## 2017-08-18 RX ADMIN — NYSTATIN 500000 UNITS: 100000 SUSPENSION ORAL at 13:45

## 2017-08-18 RX ADMIN — MORPHINE SULFATE 2 MG: 2 INJECTION, SOLUTION INTRAMUSCULAR; INTRAVENOUS at 03:49

## 2017-08-18 RX ADMIN — HYDROCODONE BITARTRATE AND ACETAMINOPHEN 1 TABLET: 5; 325 TABLET ORAL at 06:30

## 2017-08-18 RX ADMIN — MORPHINE SULFATE 2 MG: 2 INJECTION, SOLUTION INTRAMUSCULAR; INTRAVENOUS at 08:04

## 2017-08-18 RX ADMIN — MULTIPLE VITAMINS W/ MINERALS TAB 1 TABLET: TAB at 08:04

## 2017-08-18 RX ADMIN — FERROUS SULFATE TAB 325 MG (65 MG ELEMENTAL FE) 325 MG: 325 (65 FE) TAB at 17:49

## 2017-08-18 NOTE — PROGRESS NOTES
Continued Stay Note  [facility]     Patient Name: Paz Browne  MRN: [id number]  Today's Date: 8/18/2017    Admit Date: 8/9/2017          Discharge Plan       08/18/17 1525    Case Management/Social Work Plan    Plan Home with [facility]    Patient/Family In Agreement With Plan yes    Additional Comments Spoke with pt and caregiver (Felicia) at bedside.  Pt is adamant that she does not want to go to SNU but is agreeable to  and requests [facility].  States she has walker, bedside comode and grab bars at home.  Spoke with Hank with [facility] who will follow. Pt request [facility] [place] with [facility], informed Hank. ...BC Meza RN              Discharge Codes     None        Expected Discharge Date and Time     Expected Discharge Date Expected Discharge Time    Aug 18, 2017             Beverly Meza

## 2017-08-18 NOTE — PLAN OF CARE
Problem: Patient Care Overview (Adult)  Goal: Plan of Care Review  Outcome: Ongoing (interventions implemented as appropriate)    08/18/17 0356   Coping/Psychosocial Response Interventions   Plan Of Care Reviewed With patient   Patient Care Overview   Progress improving   Outcome Evaluation   Outcome Summary/Follow up Plan pt vss. pain well controlled with po pain meds. patient started on carafate yesterday. pt has not complained of any SOA this evening. possibly d/c soon. will monitor.         Problem: Fall Risk (Adult)  Goal: Identify Related Risk Factors and Signs and Symptoms  Outcome: Ongoing (interventions implemented as appropriate)    08/18/17 0356   Fall Risk   Fall Risk: Related Risk Factors culprit medication(s);gait/mobility problems;fear of falling;environment unfamiliar   Fall Risk: Signs and Symptoms presence of risk factors       Goal: Absence of Falls  Outcome: Ongoing (interventions implemented as appropriate)    08/18/17 0356   Fall Risk (Adult)   Absence of Falls making progress toward outcome         Problem: Pain, Acute (Adult)  Goal: Acceptable Pain Control/Comfort Level  Outcome: Ongoing (interventions implemented as appropriate)    08/18/17 0356   Pain, Acute (Adult)   Acceptable Pain Control/Comfort Level making progress toward outcome         Problem: GI Endoscopy (Adult)  Goal: Signs and Symptoms of Listed Potential Problems Will be Absent or Manageable (GI Endoscopy)  Outcome: Ongoing (interventions implemented as appropriate)    08/18/17 0356   GI Endoscopy   Problems Assessed (GI Endoscopy) all   Problems Present (GI Endoscopy) pain

## 2017-08-18 NOTE — PROGRESS NOTES
BGA/GI Progress Note   Chief Complaint:  Rectal bleeding, abdominal pain, dysphgia    Subjective     Interval History: No further rectal bleeding, BM yesterday brown in color, Hgb stable.  EGD reviewed with pt.  Still with c/o fairly diffuse abdominal discomfort but co's and exam unchanged over last several days.  No vomiting, will advance diet this am.  Hopeful for d/c home soon.    History taken from: patient chart family    Review of Systems:    All systems were reviewed and negative except for:  Gastrointestinal: postitive for  pain    Objective     Vital Signs  Temp:  [97.8 °F (36.6 °C)-98.7 °F (37.1 °C)] 98 °F (36.7 °C)  Heart Rate:  [58-75] 64  Resp:  [16-18] 18  BP: (132-150)/(51-68) 148/68  Body mass index is 26 kg/(m^2).    Intake/Output Summary (Last 24 hours) at 08/18/17 1150  Last data filed at 08/18/17 0715   Gross per 24 hour   Intake                0 ml   Output             1150 ml   Net            -1150 ml     I/O this shift:  In: -   Out: 400 [Urine:400]    Physical Exam:   General: patient awake, alert and cooperative, sitting in chair at bedside, talking with family and working with IS   Eyes: Normal lids and lashes, no scleral icterus   Neck: supple, normal ROM, no tracheal deviation   Skin: warm and dry, not jaundiced   Cardiovascular: regular rhythm and rate, no murmurs auscultated   Pulm: clear to auscultation bilaterally, regular and unlabored   Abdomen: obese, round, soft, diffusely tender, no rebound, no guarding, nondistended; normal bowel sounds   Rectal: deferred   Extremities: no rash or edema   Neurologic: Normal mood and behavior    All Medications Have Been Reviewed     Results Review:       Results from last 7 days  Lab Units 08/18/17  0334 08/17/17  0743 08/16/17  0349   WBC 10*3/mm3 5.99 5.55 7.41   HEMOGLOBIN g/dL 8.6* 8.6* 8.8*   HEMATOCRIT % 28.5* 27.9* 28.1*   PLATELETS 10*3/mm3 181 165 176         Results from last 7 days  Lab Units 08/18/17  0334 08/17/17  0743  08/16/17  1201 08/16/17  0349  08/13/17  0449   SODIUM mmol/L 140 142  --  133*  < > 141   POTASSIUM mmol/L 3.8 3.8  --  4.1  < > 4.0   CHLORIDE mmol/L 101 102  --  95*  < > 99   CO2 mmol/L 25.3 28.0  --  26.1  < > 28.2   BUN mg/dL 17 23  --  41*  < > 23   CREATININE mg/dL 0.87 1.00  --  1.70*  < > 1.27*   CALCIUM mg/dL 9.0 8.7  --  8.5*  < > 9.6   BILIRUBIN mg/dL  --  0.7 0.7  --   --  0.5   ALK PHOS U/L  --  98 79  --   --  77   ALT (SGPT) U/L  --  44* 25  --   --  24   AST (SGOT) U/L  --  61* 32  --   --  20   GLUCOSE mg/dL 105* 107*  --  109*  < > 105*   < > = values in this interval not displayed.      Results from last 7 days  Lab Units 08/18/17  0334 08/17/17  0743 08/16/17  1201   INR  2.11* 1.81* 2.66*       RADIOLOGY:    Imaging Results (last 72 hours)     ** No results found for the last 72 hours. **          Assessment/Plan     Patient Active Problem List   Diagnosis Code   • PAF (paroxysmal atrial fibrillation) I48.0   • Hypertension I10   • Hyperlipidemia E78.5   • COPD (chronic obstructive pulmonary disease) J44.9   • Pain medication agreement Z79.899   • Hiatal hernia K44.9   • Primary osteoarthritis involving multiple joints M15.0   • Pulmonary hypertension I27.2   • S/P TVR (tricuspid valve repair) Z98.890   • Long term current use of anticoagulant Z79.01   • Pulmonary nodule R91.1   • Hemoptysis R04.2   • RLS (restless legs syndrome) G25.81   • Chronic low back pain M54.5, G89.29   • Dysplastic polyp of colon K63.5   • Lower GI bleed K92.2   • History of mitral valve replacement with tissue graft Z95.4   • Precordial pain R07.2   • Chronic respiratory failure with hypoxia J96.11   • Oral candidiasis B37.0   • VAN (acute kidney injury) N17.9   • Anemia D64.9     Edel OSBALDO eBrnabe, APRN  08/18/17  11:50 AM    She reports continued abd pain - started since she has been in the hospital.  NO n/v - eating a little - no trouble swallowing or heartburn    abd - tender llq, no r/g, soft    Labs  reviewed      Impression  #1 Lower GI bleeding: resolved. Hgb stable in mid 8 range. Onset in the postprocedural setting s/p colonoscopy with bleeding internal hemorrhoids. Congested mucosa in transverse colon and clips placed on 8/4/17  #2 anticoagulant therapy: Coumadin stopped 8/15 per cards due to continued drop in Hgb, OK with GI for asa with plans to remain of AC  #3 Abdominal discomfort- SBFT with no obstructive process, evidence of esophagitis and mild stenosis. EGD 8/17/17 with gastritis.  Still with c/o abdominal discomfort, not related to po intake but appears unchanged over last several days.  + BM's  #4 ANNA- oral iron therapy initiated    Plan-  - continue PPI and carafate  - OK to start asa, per cards note AC to be held  - repeat CT scan given ongoing abd pain since d/c  - will advance diet today

## 2017-08-18 NOTE — PLAN OF CARE
Problem: Patient Care Overview (Adult)  Goal: Plan of Care Review  Outcome: Ongoing (interventions implemented as appropriate)    08/18/17 0356 08/18/17 1535   Coping/Psychosocial Response Interventions   Plan Of Care Reviewed With patient --    Patient Care Overview   Progress improving --    Outcome Evaluation   Outcome Summary/Follow up Plan --  VSS, pt still has some expiratory wheezes and hypoxia with activity, MD ordered Duonebs every 4 hours, wheezes have diminished and hypoxia has improved. CXR done. Patient is weak, PT will evaluate tomorrow. Abdominal pain treated with Lortab and Morphine PRN       Goal: Adult Individualization and Mutuality  Outcome: Ongoing (interventions implemented as appropriate)    Problem: Fall Risk (Adult)  Goal: Identify Related Risk Factors and Signs and Symptoms  Outcome: Ongoing (interventions implemented as appropriate)  Goal: Absence of Falls  Outcome: Ongoing (interventions implemented as appropriate)    Problem: Pain, Acute (Adult)  Goal: Identify Related Risk Factors and Signs and Symptoms  Outcome: Ongoing (interventions implemented as appropriate)  Goal: Acceptable Pain Control/Comfort Level  Outcome: Ongoing (interventions implemented as appropriate)    Problem: GI Endoscopy (Adult)  Goal: Signs and Symptoms of Listed Potential Problems Will be Absent or Manageable (GI Endoscopy)  Outcome: Ongoing (interventions implemented as appropriate)    Problem: Respiratory Insufficiency (Adult)  Goal: Identify Related Risk Factors and Signs and Symptoms  Outcome: Ongoing (interventions implemented as appropriate)  Goal: Acid/Base Balance  Outcome: Ongoing (interventions implemented as appropriate)  Goal: Effective Ventilation  Outcome: Ongoing (interventions implemented as appropriate)

## 2017-08-18 NOTE — PROGRESS NOTES
Name: Paz Browne ADMIT: 2017   : 1955  PCP: Javan Martinez MD    MRN: [id number] LOS: 9 days   AGE/SEX: 62 y.o. female  ROOM: [facility]/   Subjective   Subjective  Worsening SOA and wheezing, no cough or sputum production. No CP NVD. Abd pain unchanged.    Objective   Vital Signs  Temp:  [97.6 °F (36.4 °C)-98.7 °F (37.1 °C)] 97.6 °F (36.4 °C)  Heart Rate:  [58-76] 76  Resp:  [16-18] 16  BP: (132-148)/(66-73) 141/73  SpO2:  [93 %-99 %] 93 %  on  Flow (L/min):  [3] 3;   O2 Device: nasal cannula with humidification  Body mass index is 26 kg/(m^2).    Physical Exam   Constitutional: She appears well-developed and well-nourished. No distress.   HENT:   Head: Normocephalic and atraumatic.   Eyes: EOM are normal. Pupils are equal, round, and reactive to light.   Neck: Normal range of motion. Neck supple.   Cardiovascular: Normal rate, regular rhythm and intact distal pulses.    Pulmonary/Chest: Effort normal. She has wheezes. She has no rales.   Abdominal: Soft. There is no tenderness. There is no rebound and no guarding.   Musculoskeletal: Normal range of motion. She exhibits no edema.   Neurological: She is alert. No cranial nerve deficit.   Skin: Skin is warm and dry. She is not diaphoretic.   Psychiatric: She has a normal mood and affect. Her behavior is normal.   Nursing note and vitals reviewed.      Results Review:       I reviewed the patient's new clinical results. Reviewed telemetry, sinus rhythm. Reviewed imaging, radiology read pending, LLL edema unchanged and no acute pna on my read.    Results from last 7 days  Lab Units 17  0334 17  0743 17  0349 08/15/17  0451 17  0334 17  0449 17  0410   WBC 10*3/mm3 5.99 5.55 7.41 7.40 8.06 6.89 7.53   HEMOGLOBIN g/dL 8.6* 8.6* 8.8* 8.0* 8.6* 9.3* 8.9*   PLATELETS 10*3/mm3 181 165 176 173 197 187 189     Results from last 7 days  Lab Units 17  0334 17  0743 17  0349 08/15/17  0451 17  0449  08/12/17  0410   SODIUM mmol/L 140 142 133* 135* 141 140   POTASSIUM mmol/L 3.8 3.8 4.1 4.0 4.0 4.5   CHLORIDE mmol/L 101 102 95* 92* 99 100   CO2 mmol/L 25.3 28.0 26.1 27.6 28.2 28.1   BUN mg/dL 17 23 41* 38* 23 17   CREATININE mg/dL 0.87 1.00 1.70* 2.15* 1.27* 1.21*   GLUCOSE mg/dL 105* 107* 109* 110* 105* 109*   Estimated Creatinine Clearance: 69.2 mL/min (by C-G formula based on Cr of 0.87).  Results from last 7 days  Lab Units 08/18/17  0334 08/17/17  0743 08/16/17  1201 08/16/17  0349 08/15/17  0451 08/13/17  0449 08/12/17  0410   CALCIUM mg/dL 9.0 8.7  --  8.5* 8.6 9.6 9.2   ALBUMIN g/dL  --  4.00 3.80  --   --  4.10  --          atorvastatin 40 mg Oral Daily   budesonide-formoterol 2 puff Inhalation BID - RT   carvedilol 12.5 mg Oral BID With Meals   cetirizine 10 mg Oral Daily   cyclobenzaprine 10 mg Oral Nightly   ferrous sulfate 325 mg Oral BID With Meals   furosemide 40 mg Oral BID   hydrocortisone 25 mg Rectal BID   ipratropium-albuterol 3 mL Nebulization 4x Daily - RT   multivitamin with minerals 1 tablet Oral Daily   nystatin 5 mL Oral 4x Daily   pantoprazole 40 mg Oral QAM   polyethylene glycol 17 g Oral Daily   rOPINIRole 2 mg Oral Nightly   sertraline 100 mg Oral Daily   sucralfate 1 g Oral Q6H       sodium chloride 30 mL/hr Last Rate: 30 mL/hr (08/17/17 1059)   Diet Regular; Cardiac      Assessment/Plan   Assessment:     Active Hospital Problems (** Indicates Principal Problem)    Diagnosis Date Noted   • **Anemia [D64.9] 08/09/2017   • VAN (acute kidney injury) [N17.9] 08/15/2017   • Oral candidiasis [B37.0] 08/14/2017   • Chronic respiratory failure with hypoxia [J96.11] 08/13/2017   • History of mitral valve replacement with tissue graft [Z95.4] 08/11/2017   • Precordial pain [R07.2] 08/11/2017   • Lower GI bleed [K92.2] 08/09/2017   • Long term current use of anticoagulant [Z79.01] 10/13/2016   • Pulmonary hypertension [I27.2]    • Hypertension [I10]    • COPD (chronic obstructive pulmonary  disease) [J44.9]    • PAF (paroxysmal atrial fibrillation) [I48.0] 01/25/2016      Resolved Hospital Problems    Diagnosis Date Noted Date Resolved   No resolved problems to display.       Plan:   - Anemia: GI losses. CScope with clipped bleeding hemorrhoid. SBFT with stenosis and esophagitis. EGD with gastritis and she did not require esophageal dilation. Carafate and diet added. Hgb stable overnight.  - PAF: Rate control. AC held. Plan for discharge with ASA and off coumadin. INR still therapeutic. Cardiology following.   - Chronic Hypoxic Resp Failure/COPD/Pulm HTN: On home 3L. With wheezes today. Scheduled duonebs and increase albuterol treatments. Check CXR. Would like to avoid PO steroid if possible because of GI side effects, if no better with above treatments, may need short low dose PO steroid course. Will monitor.  - VAN: Cr improving. Continue to monitor off ACE.     Disposition  TBD/few days    Nick Tovar MD  [facility]  08/18/17  1:11 PM

## 2017-08-18 NOTE — SIGNIFICANT NOTE
08/18/17 1316   Rehab Treatment   Discipline (PT Student)   Rehab Evaluation   Evaluation Not Performed patient unavailable for evaluation  (Pt off floor to xray.  Will follow up tomorrow.)   Recommendation   PT - Next Appointment 08/19/17

## 2017-08-18 NOTE — PROGRESS NOTES
"CC: PAF/MVR    Interval History:   Feels better today    Vital Signs  Temp:  [97.8 °F (36.6 °C)-98.7 °F (37.1 °C)] 98.7 °F (37.1 °C)  Heart Rate:  [58-75] 64  Resp:  [16-20] 18  BP: (123-150)/(47-82) 148/68    Intake/Output Summary (Last 24 hours) at 08/18/17 0723  Last data filed at 08/17/17 2345   Gross per 24 hour   Intake                0 ml   Output              750 ml   Net             -750 ml     Flowsheet Rows         First Filed Value    Admission Height  67\" (170.2 cm) Documented at 08/09/2017 1152    Admission Weight  166 lb (75.3 kg) Documented at 08/09/2017 1152          PHYSICAL EXAM:  General: No acute distress  Resp:NL Rate, unlabored, Diffuse coarse rales diminished sounds especially bases  CV:NL rate and rhythm, NL PMI, Nl S1 and S2, 2/6 systolic [place], no gallop, no rub, No JVD. Normal pedal pulses  ABD:Nl sounds, no masses or tenderness, nondistended, no quarding or rebound  Neuro: alert,cooperative and oriented  Extr: No edema or cyanosis, moves all extremities      Results Review:      Results from last 7 days  Lab Units 08/18/17  0334   SODIUM mmol/L 140   POTASSIUM mmol/L 3.8   CHLORIDE mmol/L 101   CO2 mmol/L 25.3   BUN mg/dL 17   CREATININE mg/dL 0.87   GLUCOSE mg/dL 105*   CALCIUM mg/dL 9.0           Results from last 7 days  Lab Units 08/18/17  0334   WBC 10*3/mm3 5.99   HEMOGLOBIN g/dL 8.6*   HEMATOCRIT % 28.5*   PLATELETS 10*3/mm3 181       Results from last 7 days  Lab Units 08/18/17  0334 08/17/17  0743 08/16/17  1201   INR  2.11* 1.81* 2.66*   APTT seconds  --  48.8*  --                  @LABRCNT(bnp)@  I reviewed the patient's new clinical results.  I personally viewed and interpreted the patient's EKG/Telemetry data        Medication Review:   Meds reviewed      sodium chloride 30 mL/hr Last Rate: 30 mL/hr (08/17/17 1059)       Assessment/Plan  1.  GI bleed.  Status post polypectomy. Also with hemorrhoids.  Upper endoscopy showed gastritis no stricture.  Hemoglobin is stable " today.  2.  Atrial fibrillation/flutter.  Status post atrial appendage closure and cryo-maze procedure no documented recurrence since her cardioversion in 2014.  We'll maintain her off warfarin for now INR 2.1.  Would like to start aspirin daily at discharge okay with all.  3.  History of severe mitral and tricuspid insufficiency status post mitral replacement with tissue valve and tricuspid valve repair.  4.  Marked pulmonary hypertension.  Not a transplant candidate follows with pulmonary.  5.  History of hypertension blood pressure adequately controlled.  6.  Renal insufficiency improved  8.  Hyperlipidemia.  9.  Volume overload.  Needs to be back on her by mouth diuretics will restart those.    Earnest Gan MD  08/18/17  7:23 AM

## 2017-08-19 ENCOUNTER — APPOINTMENT (OUTPATIENT)
Dept: CT IMAGING | Facility: HOSPITAL | Age: 62
End: 2017-08-19

## 2017-08-19 LAB
ALBUMIN SERPL-MCNC: 4.1 G/DL (ref 3.5–5.2)
ALBUMIN/GLOB SERPL: 1.5 G/DL
ALP SERPL-CCNC: 99 U/L (ref 39–117)
ALT SERPL W P-5'-P-CCNC: 46 U/L (ref 1–33)
ANION GAP SERPL CALCULATED.3IONS-SCNC: 15.1 MMOL/L
AST SERPL-CCNC: 39 U/L (ref 1–32)
BASOPHILS # BLD AUTO: 0.03 10*3/MM3 (ref 0–0.2)
BASOPHILS NFR BLD AUTO: 0.4 % (ref 0–1.5)
BILIRUB SERPL-MCNC: 0.7 MG/DL (ref 0.1–1.2)
BUN BLD-MCNC: 16 MG/DL (ref 8–23)
BUN/CREAT SERPL: 17.8 (ref 7–25)
CALCIUM SPEC-SCNC: 9.3 MG/DL (ref 8.6–10.5)
CHLORIDE SERPL-SCNC: 98 MMOL/L (ref 98–107)
CO2 SERPL-SCNC: 26.9 MMOL/L (ref 22–29)
CREAT BLD-MCNC: 0.9 MG/DL (ref 0.57–1)
DEPRECATED RDW RBC AUTO: 47.7 FL (ref 37–54)
EOSINOPHIL # BLD AUTO: 0.2 10*3/MM3 (ref 0–0.7)
EOSINOPHIL NFR BLD AUTO: 2.6 % (ref 0.3–6.2)
ERYTHROCYTE [DISTWIDTH] IN BLOOD BY AUTOMATED COUNT: 14.5 % (ref 11.7–13)
GFR SERPL CREATININE-BSD FRML MDRD: 63 ML/MIN/1.73
GLOBULIN UR ELPH-MCNC: 2.8 GM/DL
GLUCOSE BLD-MCNC: 111 MG/DL (ref 65–99)
HCT VFR BLD AUTO: 29 % (ref 35.6–45.5)
HGB BLD-MCNC: 8.8 G/DL (ref 11.9–15.5)
IMM GRANULOCYTES # BLD: 0.02 10*3/MM3 (ref 0–0.03)
IMM GRANULOCYTES NFR BLD: 0.3 % (ref 0–0.5)
INR PPP: 2.22 (ref 0.9–1.1)
LYMPHOCYTES # BLD AUTO: 0.84 10*3/MM3 (ref 0.9–4.8)
LYMPHOCYTES NFR BLD AUTO: 11 % (ref 19.6–45.3)
MCH RBC QN AUTO: 27.2 PG (ref 26.9–32)
MCHC RBC AUTO-ENTMCNC: 30.3 G/DL (ref 32.4–36.3)
MCV RBC AUTO: 89.5 FL (ref 80.5–98.2)
MONOCYTES # BLD AUTO: 0.39 10*3/MM3 (ref 0.2–1.2)
MONOCYTES NFR BLD AUTO: 5.1 % (ref 5–12)
NEUTROPHILS # BLD AUTO: 6.15 10*3/MM3 (ref 1.9–8.1)
NEUTROPHILS NFR BLD AUTO: 80.6 % (ref 42.7–76)
PLATELET # BLD AUTO: 198 10*3/MM3 (ref 140–500)
PMV BLD AUTO: 11 FL (ref 6–12)
POTASSIUM BLD-SCNC: 3.5 MMOL/L (ref 3.5–5.2)
PROCALCITONIN SERPL-MCNC: 0.03 NG/ML (ref 0.1–0.25)
PROT SERPL-MCNC: 6.9 G/DL (ref 6–8.5)
PROTHROMBIN TIME: 23.9 SECONDS (ref 11.7–14.2)
RBC # BLD AUTO: 3.24 10*6/MM3 (ref 3.9–5.2)
SODIUM BLD-SCNC: 140 MMOL/L (ref 136–145)
WBC NRBC COR # BLD: 7.63 10*3/MM3 (ref 4.5–10.7)

## 2017-08-19 PROCEDURE — 0 DIATRIZOATE MEGLUMINE & SODIUM PER 1 ML: Performed by: INTERNAL MEDICINE

## 2017-08-19 PROCEDURE — 94799 UNLISTED PULMONARY SVC/PX: CPT

## 2017-08-19 PROCEDURE — 85025 COMPLETE CBC W/AUTO DIFF WBC: CPT | Performed by: INTERNAL MEDICINE

## 2017-08-19 PROCEDURE — 99232 SBSQ HOSP IP/OBS MODERATE 35: CPT | Performed by: INTERNAL MEDICINE

## 2017-08-19 PROCEDURE — 80053 COMPREHEN METABOLIC PANEL: CPT | Performed by: INTERNAL MEDICINE

## 2017-08-19 PROCEDURE — 85610 PROTHROMBIN TIME: CPT | Performed by: INTERNAL MEDICINE

## 2017-08-19 PROCEDURE — 25010000002 MORPHINE SULFATE (PF) 2 MG/ML SOLUTION: Performed by: INTERNAL MEDICINE

## 2017-08-19 PROCEDURE — 25010000002 METHYLPREDNISOLONE PER 125 MG: Performed by: INTERNAL MEDICINE

## 2017-08-19 PROCEDURE — 97110 THERAPEUTIC EXERCISES: CPT

## 2017-08-19 PROCEDURE — 97162 PT EVAL MOD COMPLEX 30 MIN: CPT

## 2017-08-19 PROCEDURE — 84145 PROCALCITONIN (PCT): CPT | Performed by: INTERNAL MEDICINE

## 2017-08-19 RX ORDER — LISINOPRIL 5 MG/1
5 TABLET ORAL
Status: DISCONTINUED | OUTPATIENT
Start: 2017-08-19 | End: 2017-08-24

## 2017-08-19 RX ORDER — MAGNESIUM CARB/ALUMINUM HYDROX 105-160MG
296 TABLET,CHEWABLE ORAL ONCE
Status: COMPLETED | OUTPATIENT
Start: 2017-08-19 | End: 2017-08-19

## 2017-08-19 RX ORDER — IPRATROPIUM BROMIDE AND ALBUTEROL SULFATE 2.5; .5 MG/3ML; MG/3ML
3 SOLUTION RESPIRATORY (INHALATION)
Status: DISCONTINUED | OUTPATIENT
Start: 2017-08-19 | End: 2017-08-25 | Stop reason: HOSPADM

## 2017-08-19 RX ORDER — PREDNISONE 20 MG/1
40 TABLET ORAL
Status: DISCONTINUED | OUTPATIENT
Start: 2017-08-20 | End: 2017-08-21

## 2017-08-19 RX ORDER — GUAIFENESIN 600 MG/1
600 TABLET, EXTENDED RELEASE ORAL EVERY 12 HOURS SCHEDULED
Status: DISCONTINUED | OUTPATIENT
Start: 2017-08-19 | End: 2017-08-25 | Stop reason: HOSPADM

## 2017-08-19 RX ORDER — METHYLPREDNISOLONE SODIUM SUCCINATE 125 MG/2ML
125 INJECTION, POWDER, LYOPHILIZED, FOR SOLUTION INTRAMUSCULAR; INTRAVENOUS ONCE
Status: COMPLETED | OUTPATIENT
Start: 2017-08-19 | End: 2017-08-19

## 2017-08-19 RX ADMIN — LISINOPRIL 5 MG: 5 TABLET ORAL at 11:38

## 2017-08-19 RX ADMIN — GUAIFENESIN 600 MG: 600 TABLET, EXTENDED RELEASE ORAL at 14:55

## 2017-08-19 RX ADMIN — HYDROCODONE BITARTRATE AND ACETAMINOPHEN 1 TABLET: 5; 325 TABLET ORAL at 11:30

## 2017-08-19 RX ADMIN — ATORVASTATIN CALCIUM 40 MG: 20 TABLET, FILM COATED ORAL at 08:40

## 2017-08-19 RX ADMIN — FERROUS SULFATE TAB 325 MG (65 MG ELEMENTAL FE) 325 MG: 325 (65 FE) TAB at 08:40

## 2017-08-19 RX ADMIN — IPRATROPIUM BROMIDE AND ALBUTEROL SULFATE 3 ML: .5; 3 SOLUTION RESPIRATORY (INHALATION) at 23:48

## 2017-08-19 RX ADMIN — HYDROCORTISONE ACETATE 25 MG: 25 SUPPOSITORY RECTAL at 08:40

## 2017-08-19 RX ADMIN — POLYETHYLENE GLYCOL 3350 17 G: 17 POWDER, FOR SOLUTION ORAL at 10:17

## 2017-08-19 RX ADMIN — HYDROCODONE BITARTRATE AND ACETAMINOPHEN 1 TABLET: 5; 325 TABLET ORAL at 21:53

## 2017-08-19 RX ADMIN — NYSTATIN 500000 UNITS: 100000 SUSPENSION ORAL at 08:40

## 2017-08-19 RX ADMIN — TEMAZEPAM 30 MG: 15 CAPSULE ORAL at 21:54

## 2017-08-19 RX ADMIN — CARVEDILOL 12.5 MG: 12.5 TABLET, FILM COATED ORAL at 08:40

## 2017-08-19 RX ADMIN — MORPHINE SULFATE 2 MG: 2 INJECTION, SOLUTION INTRAMUSCULAR; INTRAVENOUS at 04:23

## 2017-08-19 RX ADMIN — CARVEDILOL 12.5 MG: 12.5 TABLET, FILM COATED ORAL at 18:11

## 2017-08-19 RX ADMIN — DIATRIZOATE MEGLUMINE AND DIATRIZOATE SODIUM 30 ML: 600; 100 SOLUTION ORAL; RECTAL at 07:25

## 2017-08-19 RX ADMIN — IPRATROPIUM BROMIDE AND ALBUTEROL SULFATE 3 ML: .5; 3 SOLUTION RESPIRATORY (INHALATION) at 08:01

## 2017-08-19 RX ADMIN — HYDROCODONE BITARTRATE AND ACETAMINOPHEN 1 TABLET: 5; 325 TABLET ORAL at 18:11

## 2017-08-19 RX ADMIN — SUCRALFATE 1 G: 1 SUSPENSION ORAL at 18:11

## 2017-08-19 RX ADMIN — MORPHINE SULFATE 2 MG: 2 INJECTION, SOLUTION INTRAMUSCULAR; INTRAVENOUS at 14:59

## 2017-08-19 RX ADMIN — CYCLOBENZAPRINE HYDROCHLORIDE 10 MG: 10 TABLET, FILM COATED ORAL at 20:19

## 2017-08-19 RX ADMIN — BUDESONIDE AND FORMOTEROL FUMARATE DIHYDRATE 2 PUFF: 160; 4.5 AEROSOL RESPIRATORY (INHALATION) at 21:57

## 2017-08-19 RX ADMIN — ALBUTEROL SULFATE 2.5 MG: 2.5 SOLUTION RESPIRATORY (INHALATION) at 04:10

## 2017-08-19 RX ADMIN — ROPINIROLE 2 MG: 2 TABLET, FILM COATED ORAL at 20:19

## 2017-08-19 RX ADMIN — GUAIFENESIN 600 MG: 600 TABLET, EXTENDED RELEASE ORAL at 20:20

## 2017-08-19 RX ADMIN — PANTOPRAZOLE SODIUM 40 MG: 40 TABLET, DELAYED RELEASE ORAL at 06:20

## 2017-08-19 RX ADMIN — SUCRALFATE 1 G: 1 SUSPENSION ORAL at 21:57

## 2017-08-19 RX ADMIN — SUCRALFATE 1 G: 1 SUSPENSION ORAL at 11:30

## 2017-08-19 RX ADMIN — SERTRALINE 100 MG: 100 TABLET, FILM COATED ORAL at 08:40

## 2017-08-19 RX ADMIN — SUCRALFATE 1 G: 1 SUSPENSION ORAL at 06:20

## 2017-08-19 RX ADMIN — NYSTATIN 500000 UNITS: 100000 SUSPENSION ORAL at 11:30

## 2017-08-19 RX ADMIN — NYSTATIN 500000 UNITS: 100000 SUSPENSION ORAL at 20:19

## 2017-08-19 RX ADMIN — IPRATROPIUM BROMIDE AND ALBUTEROL SULFATE 3 ML: .5; 3 SOLUTION RESPIRATORY (INHALATION) at 15:15

## 2017-08-19 RX ADMIN — NYSTATIN 500000 UNITS: 100000 SUSPENSION ORAL at 18:11

## 2017-08-19 RX ADMIN — MULTIPLE VITAMINS W/ MINERALS TAB 1 TABLET: TAB at 08:40

## 2017-08-19 RX ADMIN — BUDESONIDE AND FORMOTEROL FUMARATE DIHYDRATE 2 PUFF: 160; 4.5 AEROSOL RESPIRATORY (INHALATION) at 08:01

## 2017-08-19 RX ADMIN — CETIRIZINE HYDROCHLORIDE 10 MG: 10 TABLET, FILM COATED ORAL at 08:40

## 2017-08-19 RX ADMIN — FERROUS SULFATE TAB 325 MG (65 MG ELEMENTAL FE) 325 MG: 325 (65 FE) TAB at 18:11

## 2017-08-19 RX ADMIN — METHYLPREDNISOLONE SODIUM SUCCINATE 125 MG: 125 INJECTION, POWDER, FOR SOLUTION INTRAMUSCULAR; INTRAVENOUS at 18:11

## 2017-08-19 RX ADMIN — FUROSEMIDE 40 MG: 40 TABLET ORAL at 08:40

## 2017-08-19 RX ADMIN — Medication 296 ML: at 14:55

## 2017-08-19 RX ADMIN — FUROSEMIDE 40 MG: 40 TABLET ORAL at 18:11

## 2017-08-19 RX ADMIN — MORPHINE SULFATE 2 MG: 2 INJECTION, SOLUTION INTRAMUSCULAR; INTRAVENOUS at 08:40

## 2017-08-19 RX ADMIN — IPRATROPIUM BROMIDE AND ALBUTEROL SULFATE 3 ML: .5; 3 SOLUTION RESPIRATORY (INHALATION) at 20:10

## 2017-08-19 RX ADMIN — IPRATROPIUM BROMIDE AND ALBUTEROL SULFATE 3 ML: .5; 3 SOLUTION RESPIRATORY (INHALATION) at 11:00

## 2017-08-19 NOTE — PROGRESS NOTES
[facility]  Inpatient Progress Note    Reason for Follow Up:  We are following for abdominal pain    Subjective     Interval History:   Abdominal pain unchanged today, lower quadrants bilaterally, she is currently waiting for her CAT scan    Current Facility-Administered Medications:   •  acetaminophen (TYLENOL) tablet 650 mg, 650 mg, Oral, Q6H PRN, Tata Newby MD  •  albuterol (PROVENTIL) nebulizer solution 0.083% 2.5 mg/3mL, 2.5 mg, Nebulization, Q4H PRN, Nick Tovar MD, 2.5 mg at 08/19/17 0410  •  atorvastatin (LIPITOR) tablet 40 mg, 40 mg, Oral, Daily, Tata Newby MD, 40 mg at 08/19/17 0840  •  bisacodyl (DULCOLAX) suppository 10 mg, 10 mg, Rectal, Daily PRN, Tata Newby MD  •  budesonide-formoterol (SYMBICORT) 160-4.5 MCG/ACT inhaler 2 puff, 2 puff, Inhalation, BID - RT, Tata Newby MD, 2 puff at 08/19/17 0801  •  calcium carbonate (TUMS) chewable tablet 500 mg (200 mg elemental), 1 tablet, Oral, BID PRN, Tata Newby MD  •  carvedilol (COREG) tablet 12.5 mg, 12.5 mg, Oral, BID With Meals, Kirti Arteaga MD, 12.5 mg at 08/19/17 0840  •  cetirizine (zyrTEC) tablet 10 mg, 10 mg, Oral, Daily, Tata Newby MD, 10 mg at 08/19/17 0840  •  cyclobenzaprine (FLEXERIL) tablet 10 mg, 10 mg, Oral, Nightly, Tata Newby MD, 10 mg at 08/18/17 2107  •  ferrous sulfate tablet 325 mg, 325 mg, Oral, BID With Meals, Cleveland Devine MD, 325 mg at 08/19/17 0840  •  furosemide (LASIX) tablet 40 mg, 40 mg, Oral, BID, Earnest Gan MD, 40 mg at 08/19/17 0840  •  HYDROcodone-acetaminophen (NORCO) 5-325 MG per tablet 1 tablet, 1 tablet, Oral, Q4H PRN, Nick Tovar MD, 1 tablet at 08/18/17 2106  •  hydrocortisone (ANUSOL-HC) suppository 25 mg, 25 mg, Rectal, BID, Earnest PALOMO MD, 25 mg at 08/19/17 0840  •  hyoscyamine (LEVSIN) SL tablet 125 mcg, 125 mcg, Sublingual, Q4H PRN, BA Vaca, 125 mcg at  08/11/17 1107  •  ipratropium-albuterol (DUO-NEB) nebulizer solution 3 mL, 3 mL, Nebulization, 4x Daily - RT, Nick Tovar MD, 3 mL at 08/19/17 1100  •  lisinopril (PRINIVIL,ZESTRIL) tablet 5 mg, 5 mg, Oral, Q24H, Kirit Arteaga MD  •  magnesium hydroxide (MILK OF MAGNESIA) suspension 2400 mg/10mL 10 mL, 10 mL, Oral, Daily PRN, Tata Newby MD  •  meclizine (ANTIVERT) tablet 25 mg, 25 mg, Oral, TID PRN, Tata Newby MD  •  Morphine sulfate (PF) injection 2 mg, 2 mg, Intravenous, Q4H PRN, 2 mg at 08/19/17 0840 **AND** naloxone (NARCAN) injection 0.4 mg, 0.4 mg, Intravenous, Q5 Min PRN, Tata Newby MD  •  multivitamin with minerals 1 tablet, 1 tablet, Oral, Daily, Tata Newby MD, 1 tablet at 08/19/17 0840  •  nystatin (MYCOSTATIN) 708215 UNIT/ML suspension 500,000 Units, 5 mL, Oral, 4x Daily, Fam Sanderson MD, 500,000 Units at 08/19/17 0840  •  ondansetron (ZOFRAN) injection 4 mg, 4 mg, Intravenous, Q6H PRN, Tata Newby MD, 4 mg at 08/17/17 2232  •  pantoprazole (PROTONIX) EC tablet 40 mg, 40 mg, Oral, QAM, Tata Newby MD, 40 mg at 08/19/17 0620  •  polyethylene glycol (MIRALAX) powder 17 g, 17 g, Oral, Daily, Fam Sanderson MD, 17 g at 08/19/17 1017  •  rOPINIRole (REQUIP) tablet 2 mg, 2 mg, Oral, Nightly, Taat Newby MD, 2 mg at 08/18/17 2107  •  sertraline (ZOLOFT) tablet 100 mg, 100 mg, Oral, Daily, Tata Newby MD, 100 mg at 08/19/17 0840  •  sodium chloride (OCEAN) nasal spray 2 spray, 2 spray, Each Nare, PRN, Leonel Ruiz MD  •  sodium chloride 0.9 % flush 1-10 mL, 1-10 mL, Intravenous, PRN, Tata Newby MD  •  sodium chloride 0.9 % flush 10 mL, 10 mL, Intravenous, PRN, Amrit Reinoso MD  •  sodium chloride 0.9 % infusion, 30 mL/hr, Intravenous, Continuous PRN, Porsha Ruby MD, Last Rate: 30 mL/hr at 08/17/17 1059, 30 mL/hr at 08/17/17 1059  •  sucralfate (CARAFATE) 1 GM/10ML  suspension 1 g, 1 g, Oral, Q6H, Porsha Ruby MD, 1 g at 08/19/17 0620  •  temazepam (RESTORIL) capsule 30 mg, 30 mg, Oral, Nightly PRN, Tata Newby MD, 30 mg at 08/18/17 2211  Review of Systems:    The following systems were reviewed and negative;  eyes, ENT, respiratory, cardiovascular, genitourinary, hematologic / lymphatic, musculoskeletal, neurological, behavioral/psych, endocrine and allergies / immunologic    Objective     Vital Signs  Temp:  [97.6 °F (36.4 °C)-98.1 °F (36.7 °C)] 97.7 °F (36.5 °C)  Heart Rate:  [60-76] 68  Resp:  [16-20] 18  BP: (136-158)/(63-81) 158/81  Body mass index is 26 kg/(m^2).    Intake/Output Summary (Last 24 hours) at 08/19/17 1102  Last data filed at 08/19/17 1038   Gross per 24 hour   Intake              960 ml   Output             1700 ml   Net             -740 ml     I/O this shift:  In: 960 [P.O.:960]  Out: 400 [Urine:400]     Physical Exam:   General: patient awake, alert and cooperative   Eyes: Normal lids and lashes, no scleral icterus   Neck: supple, normal ROM   Skin: warm and dry, not jaundiced   Cardiovascular: regular rhythm and rate, no murmurs auscultated   Pulm: clear to auscultation bilaterally, regular and unlabored   Abdomen: soft, nontender, nondistended; normal bowel sounds   Rectal: deferred   Extremities: no rash or edema   Psychiatric: Normal mood and behavior; memory intact     Results Review:     I reviewed the patient's new clinical results.      Results from last 7 days  Lab Units 08/19/17  0440 08/18/17  0334 08/17/17  0743   WBC 10*3/mm3 7.63 5.99 5.55   HEMOGLOBIN g/dL 8.8* 8.6* 8.6*   HEMATOCRIT % 29.0* 28.5* 27.9*   PLATELETS 10*3/mm3 198 181 165       Results from last 7 days  Lab Units 08/19/17  0440 08/18/17  0334 08/17/17  0743 08/16/17  1201   SODIUM mmol/L 140 140 142  --    POTASSIUM mmol/L 3.5 3.8 3.8  --    CHLORIDE mmol/L 98 101 102  --    CO2 mmol/L 26.9 25.3 28.0  --    BUN mg/dL 16 17 23  --    CREATININE mg/dL 0.90 0.87  1.00  --    CALCIUM mg/dL 9.3 9.0 8.7  --    BILIRUBIN mg/dL 0.7  --  0.7 0.7   ALK PHOS U/L 99  --  98 79   ALT (SGPT) U/L 46*  --  44* 25   AST (SGOT) U/L 39*  --  61* 32   GLUCOSE mg/dL 111* 105* 107*  --        Results from last 7 days  Lab Units 08/19/17  0440 08/18/17  0334 08/17/17  0743   INR  2.22* 2.11* 1.81*       Lab Results  Lab Value Date/Time   LIPASE 46 08/13/2017 0449       Radiology:  XR Chest PA & Lateral   Final Result      FL Upper GI With Air Contrast & SBFT   Final Result      XR Chest PA & Lateral   Final Result      CT Abdomen Pelvis Without Contrast   Final Result   1. No evidence for acute intra-abdominal process.   2. Basilar pulmonary emphysema.   3. Hiatal hernia. Atherosclerotic disease. Previous cholecystectomy and   hysterectomy.       This report was finalized on 8/9/2017 3:26 PM by Dr. Magdi Greer MD.          CT Abdomen Pelvis With Contrast    (Results Pending)       Assessment/Plan     Patient Active Problem List   Diagnosis   • PAF (paroxysmal atrial fibrillation)   • Hypertension   • Hyperlipidemia   • COPD (chronic obstructive pulmonary disease)   • Pain medication agreement   • Hiatal hernia   • Primary osteoarthritis involving multiple joints   • Pulmonary hypertension   • S/P TVR (tricuspid valve repair)   • Long term current use of anticoagulant   • Pulmonary nodule   • Hemoptysis   • RLS (restless legs syndrome)   • Chronic low back pain   • Dysplastic polyp of colon   • Lower GI bleed   • History of mitral valve replacement with tissue graft   • Precordial pain   • Chronic respiratory failure with hypoxia   • Oral candidiasis   • VAN (acute kidney injury)   • Anemia         Impression  #1 Lower GI bleeding: resolved. Hgb stable in mid 8 range. Onset in the postprocedural setting s/p colonoscopy with bleeding internal hemorrhoids. Congested mucosa in transverse colon and clips placed on 8/4/17  #2 anticoagulant therapy: Coumadin stopped 8/15 per cards due to  continued drop in Hgb, OK with GI for asa with plans to remain of AC  #3 Abdominal discomfort- SBFT with no obstructive process, evidence of esophagitis and mild stenosis. EGD 8/17/17 with gastritis.  Still with c/o abdominal discomfort, not related to po intake but appears unchanged over last several days.  + BM's  #4 ANNA- oral iron therapy initiated     Plan-  - continue PPI and carafate  - OK to start asa, per cards note AC to be held  - repeat CT scan given ongoing abd pain since d/c, awaiting results    I discussed the patients findings and my recommendations with patient and nursing staff.    Rick Mark MD

## 2017-08-19 NOTE — PLAN OF CARE
Problem: Patient Care Overview (Adult)  Goal: Plan of Care Review  Outcome: Ongoing (interventions implemented as appropriate)    08/19/17 1848   Coping/Psychosocial Response Interventions   Plan Of Care Reviewed With patient   Patient Care Overview   Progress improving   Outcome Evaluation   Outcome Summary/Follow up Plan pt vss. ct cancelled today due to precence of barium still in the bowel. mirlax and mag citrate given and will reattempt tomorrow. still c/o of pain in abdomen. alternating iv and po pain medication. continue to monitor       Goal: Adult Individualization and Mutuality  Outcome: Ongoing (interventions implemented as appropriate)  Goal: Discharge Needs Assessment  Outcome: Ongoing (interventions implemented as appropriate)    08/19/17 1848   Discharge Needs Assessment   Concerns To Be Addressed basic needs concerns   Discharge Disposition still a patient         Problem: Fall Risk (Adult)  Goal: Identify Related Risk Factors and Signs and Symptoms  Outcome: Ongoing (interventions implemented as appropriate)    08/19/17 1848   Fall Risk   Fall Risk: Related Risk Factors gait/mobility problems;environment unfamiliar   Fall Risk: Signs and Symptoms presence of risk factors       Goal: Absence of Falls  Outcome: Ongoing (interventions implemented as appropriate)    08/19/17 1848   Fall Risk (Adult)   Absence of Falls making progress toward outcome         Problem: Pain, Acute (Adult)  Goal: Identify Related Risk Factors and Signs and Symptoms  Outcome: Ongoing (interventions implemented as appropriate)    08/19/17 0345 08/19/17 1848   Pain, Acute   Related Risk Factors (Acute Pain) patient perception;procedure/treatment --    Signs and Symptoms (Acute Pain) --  verbalization of pain descriptors       Goal: Acceptable Pain Control/Comfort Level  Outcome: Ongoing (interventions implemented as appropriate)    08/19/17 1848   Pain, Acute (Adult)   Acceptable Pain Control/Comfort Level making progress  toward outcome         Problem: GI Endoscopy (Adult)  Goal: Signs and Symptoms of Listed Potential Problems Will be Absent or Manageable (GI Endoscopy)  Outcome: Ongoing (interventions implemented as appropriate)    08/19/17 1848   GI Endoscopy   Problems Assessed (GI Endoscopy) all   Problems Present (GI Endoscopy) pain         Problem: Respiratory Insufficiency (Adult)  Goal: Identify Related Risk Factors and Signs and Symptoms  Outcome: Ongoing (interventions implemented as appropriate)    08/19/17 1848   Respiratory Insufficiency   Related Risk Factors (Respiratory Insufficiency) activity intolerance   Signs and Symptoms (Respiratory Insufficiency) abnormal breath sounds;breathing pattern changes;shortness of breath       Goal: Acid/Base Balance  Outcome: Ongoing (interventions implemented as appropriate)    08/19/17 1848   Respiratory Insufficiency (Adult)   Acid/Base Balance making progress toward outcome       Goal: Effective Ventilation  Outcome: Ongoing (interventions implemented as appropriate)    08/19/17 1848   Respiratory Insufficiency (Adult)   Effective Ventilation making progress toward outcome

## 2017-08-19 NOTE — PROGRESS NOTES
LOS: 10 days   Patient Care Team:  Javan Martinez MD as PCP - General  Javan Martinez MD as PCP - Family Medicine    Chief Complaint:  F/u GI bleed  & a fib     Interval History:   Has lower abd pain and cough with thick yellow secretions.  No cp    Objective   Vital Signs  Temp:  [97.6 °F (36.4 °C)-98.1 °F (36.7 °C)] 97.7 °F (36.5 °C)  Heart Rate:  [60-76] 68  Resp:  [16-20] 18  BP: (136-158)/(63-81) 158/81    Intake/Output Summary (Last 24 hours) at 08/19/17 1015  Last data filed at 08/19/17 0902   Gross per 24 hour   Intake              960 ml   Output             1300 ml   Net             -340 ml       Comfortable NAD  Neck supple, no JVD or thyromegaly appreciated  S1/S2 RRR, no m/r/g  Lungs decreased throughout, normal effort.   Abdomen S/ND (+) BS, no HSM appreciated, tender lower abd  Extremities warm, no clubbing, cyanosis, or edema  No visible or palpable skin lesions  A/Ox4, mood and affect appropriate    Results Review:        Results from last 7 days  Lab Units 08/19/17 0440 08/18/17  0334 08/17/17  0743   SODIUM mmol/L 140 140 142   POTASSIUM mmol/L 3.5 3.8 3.8   CHLORIDE mmol/L 98 101 102   CO2 mmol/L 26.9 25.3 28.0   BUN mg/dL 16 17 23   CREATININE mg/dL 0.90 0.87 1.00   GLUCOSE mg/dL 111* 105* 107*   CALCIUM mg/dL 9.3 9.0 8.7           Results from last 7 days  Lab Units 08/19/17  0440 08/18/17  0334 08/17/17  0743   WBC 10*3/mm3 7.63 5.99 5.55   HEMOGLOBIN g/dL 8.8* 8.6* 8.6*   HEMATOCRIT % 29.0* 28.5* 27.9*   PLATELETS 10*3/mm3 198 181 165       Results from last 7 days  Lab Units 08/19/17  0440 08/18/17  0334 08/17/17  0743   INR  2.22* 2.11* 1.81*   APTT seconds  --   --  48.8*                   I reviewed the patient's new clinical results.  I personally viewed and interpreted the patient's EKG/Telemetry data        Medication Review:     atorvastatin 40 mg Oral Daily   budesonide-formoterol 2 puff Inhalation BID - RT   carvedilol 12.5 mg Oral BID With Meals   cetirizine 10 mg Oral  Daily   cyclobenzaprine 10 mg Oral Nightly   ferrous sulfate 325 mg Oral BID With Meals   furosemide 40 mg Oral BID   hydrocortisone 25 mg Rectal BID   ipratropium-albuterol 3 mL Nebulization 4x Daily - RT   multivitamin with minerals 1 tablet Oral Daily   nystatin 5 mL Oral 4x Daily   pantoprazole 40 mg Oral QAM   polyethylene glycol 17 g Oral Daily   rOPINIRole 2 mg Oral Nightly   sertraline 100 mg Oral Daily   sucralfate 1 g Oral Q6H         sodium chloride 30 mL/hr Last Rate: 30 mL/hr (08/17/17 1059)       Assessment/Plan     Principal Problem:    Anemia  Active Problems:    PAF (paroxysmal atrial fibrillation)    Hypertension    COPD (chronic obstructive pulmonary disease)    Pulmonary hypertension    Long term current use of anticoagulant    Lower GI bleed    History of mitral valve replacement with tissue graft    Precordial pain    Chronic respiratory failure with hypoxia    Oral candidiasis    VAN (acute kidney injury)    1.  GI bleed.  Status post polypectomy. Also with hemorrhoids.  Upper endoscopy showed gastritis no stricture.  Hemoglobin is stable today.  2.  Atrial fibrillation/flutter.  Status post atrial appendage closure and cryo-maze procedure no documented recurrence since her cardioversion in 2014.  We'll maintain her off warfarin for now INR 2.1.  Would like to start aspirin daily at discharge okay with all.  3.  History of severe mitral and tricuspid insufficiency status post mitral replacement with tissue valve and tricuspid valve repair.  4.  Marked pulmonary hypertension.  Not a transplant candidate follows with pulmonary.  5.  History of hypertension, blood pressure high, restart lisinopril  6.  Renal insufficiency improved  8.  Hyperlipidemia.  9.  Volume overload, better, back on po lasix but still dyspnea with thick yellow secretions.  Does not look volume overloaded and breathing better after coughing up secretions.  No changes for now.  normal LVEF 6/2016    Will see Monday.      Kirti Arteaga MD  08/19/17  10:15 AM

## 2017-08-19 NOTE — PLAN OF CARE
Problem: Patient Care Overview (Adult)  Goal: Plan of Care Review  Outcome: Ongoing (interventions implemented as appropriate)    08/19/17 0345   Coping/Psychosocial Response Interventions   Plan Of Care Reviewed With patient   Patient Care Overview   Progress improving   Outcome Evaluation   Outcome Summary/Follow up Plan wheezing continues inspiratory an expiratory continues with abdominal discomfort requiring pain medication coughing makes the pain worse         Problem: Fall Risk (Adult)  Goal: Identify Related Risk Factors and Signs and Symptoms  Outcome: Ongoing (interventions implemented as appropriate)    Problem: Pain, Acute (Adult)  Goal: Identify Related Risk Factors and Signs and Symptoms  Outcome: Ongoing (interventions implemented as appropriate)    Problem: GI Endoscopy (Adult)  Goal: Signs and Symptoms of Listed Potential Problems Will be Absent or Manageable (GI Endoscopy)  Outcome: Ongoing (interventions implemented as appropriate)    Problem: Respiratory Insufficiency (Adult)  Goal: Identify Related Risk Factors and Signs and Symptoms  Outcome: Ongoing (interventions implemented as appropriate)

## 2017-08-19 NOTE — PLAN OF CARE
Problem: Patient Care Overview (Adult)  Goal: Plan of Care Review  Outcome: Ongoing (interventions implemented as appropriate)    08/19/17 0953   Coping/Psychosocial Response Interventions   Plan Of Care Reviewed With patient   Outcome Evaluation   Outcome Summary/Follow up Plan Pt able to demonstrate bed mobility, transfers, and ambulation with use of Rwx and CGA. She ambulated 20 ft with supplemental O2 and Rwx with CGA, requiring a seated rest break following performance secondary to fatigue. Pt was notably fatigued/SOA following ambulation and required verbal cueing for pursed lip breathing to maintain O2 sats. Pt ended session in bed with transport. Due to noted weakness in BLE and overall decreased aerobic endurance, pt will benefit from continuing skilled PT while at [facility] in order to improve independence with functional mobility and to decrease falls risk prior to d/c. PT recommends d/c home with assistance, along with a home health evaluation for safety.          Problem: Inpatient Physical Therapy  Goal: Bed Mobility Goal LTG- PT    08/19/17 0953   Bed Mobility PT LTG   Bed Mobility PT LTG, Date Established 08/19/17   Bed Mobility PT LTG, Time to Achieve 1 wk   Bed Mobility PT LTG, Activity Type all bed mobility   Bed Mobility PT LTG, [place] Level independent       Goal: Transfer Training Goal 1 LTG- PT    08/19/17 0953   Transfer Training PT LTG   Transfer Training PT LTG, Date Established 08/19/17   Transfer Training PT LTG, Time to Achieve 1 wk   Transfer Training PT LTG, Activity Type all transfers   Transfer Training PT LTG, [place] Level conditional independence   Transfer Training PT LTG, Assist Device walker, rolling       Goal: Gait Training Goal LTG- PT    08/19/17 0953   Gait Training PT LTG   Gait Training Goal PT LTG, Date Established 08/19/17   Gait Training Goal PT LTG, Time to Achieve 1 wk   Gait Training Goal PT LTG, [place] Level conditional independence   Gait Training Goal  PT LTG, Assist Device walker, rolling   Gait Training Goal PT LTG, Distance to Achieve 150 ft

## 2017-08-19 NOTE — PROGRESS NOTES
Name: Paz Browne ADMIT: 2017   : 1955  PCP: Javan Martinez MD    MRN: [id number] LOS: 10 days   AGE/SEX: 62 y.o. female  ROOM: [facility]/   Subjective   Subjective  Dyspnea improved. On home 3L. Some productive sputum today. No fevers or CP. No NVD. Abd pain still present.    Objective   Vital Signs  Temp:  [97.7 °F (36.5 °C)-98.1 °F (36.7 °C)] 97.7 °F (36.5 °C)  Heart Rate:  [60-70] 60  Resp:  [16-20] 18  BP: (136-158)/(63-81) 158/81  SpO2:  [94 %-100 %] 95 %  on  Flow (L/min):  [3-5] 3;   O2 Device: nasal cannula  Body mass index is 26 kg/(m^2).    Physical Exam   Constitutional: She appears well-developed and well-nourished. No distress.   HENT:   Head: Normocephalic and atraumatic.   Eyes: EOM are normal. Pupils are equal, round, and reactive to light.   Neck: Normal range of motion. Neck supple.   Cardiovascular: Normal rate, regular rhythm and intact distal pulses.    Pulmonary/Chest: Effort normal. She has decreased breath sounds. She has wheezes (mild end expiratory, L>R). She has no rales.   Abdominal: Soft. There is no tenderness. There is no rebound and no guarding.   Musculoskeletal: Normal range of motion. She exhibits no edema.   Neurological: She is alert. No cranial nerve deficit.   Skin: Skin is warm and dry. She is not diaphoretic.   Psychiatric: She has a normal mood and affect. Her behavior is normal.   Nursing note and vitals reviewed.      Results Review:       I reviewed the patient's new clinical results. Reviewed imaging, agree with interpretation.    Results from last 7 days  Lab Units 17  0440 17  0334 17  0743 17  0349 08/15/17  0451 17  0334 17  0449   WBC 10*3/mm3 7.63 5.99 5.55 7.41 7.40 8.06 6.89   HEMOGLOBIN g/dL 8.8* 8.6* 8.6* 8.8* 8.0* 8.6* 9.3*   PLATELETS 10*3/mm3 198 181 165 176 173 197 187     Results from last 7 days  Lab Units 17  0440 17  0334 17  0743 17  0349 08/15/17  0451 17  0449    SODIUM mmol/L 140 140 142 133* 135* 141   POTASSIUM mmol/L 3.5 3.8 3.8 4.1 4.0 4.0   CHLORIDE mmol/L 98 101 102 95* 92* 99   CO2 mmol/L 26.9 25.3 28.0 26.1 27.6 28.2   BUN mg/dL 16 17 23 41* 38* 23   CREATININE mg/dL 0.90 0.87 1.00 1.70* 2.15* 1.27*   GLUCOSE mg/dL 111* 105* 107* 109* 110* 105*   Estimated Creatinine Clearance: 66.9 mL/min (by C-G formula based on Cr of 0.9).  Results from last 7 days  Lab Units 08/19/17  0440 08/18/17  0334 08/17/17  0743 08/16/17  1201 08/16/17  0349 08/15/17  0451 08/13/17  0449   CALCIUM mg/dL 9.3 9.0 8.7  --  8.5* 8.6 9.6   ALBUMIN g/dL 4.10  --  4.00 3.80  --   --  4.10         atorvastatin 40 mg Oral Daily   budesonide-formoterol 2 puff Inhalation BID - RT   carvedilol 12.5 mg Oral BID With Meals   cetirizine 10 mg Oral Daily   cyclobenzaprine 10 mg Oral Nightly   ferrous sulfate 325 mg Oral BID With Meals   furosemide 40 mg Oral BID   hydrocortisone 25 mg Rectal BID   ipratropium-albuterol 3 mL Nebulization 4x Daily - RT   lisinopril 5 mg Oral Q24H   multivitamin with minerals 1 tablet Oral Daily   nystatin 5 mL Oral 4x Daily   pantoprazole 40 mg Oral QAM   polyethylene glycol 17 g Oral Daily   rOPINIRole 2 mg Oral Nightly   sertraline 100 mg Oral Daily   sucralfate 1 g Oral Q6H       sodium chloride 30 mL/hr Last Rate: 30 mL/hr (08/17/17 1059)   Diet Regular; Cardiac      Assessment/Plan   Assessment:     Active Hospital Problems (** Indicates Principal Problem)    Diagnosis Date Noted   • **Anemia [D64.9] 08/09/2017   • VAN (acute kidney injury) [N17.9] 08/15/2017   • Oral candidiasis [B37.0] 08/14/2017   • Chronic respiratory failure with hypoxia [J96.11] 08/13/2017   • History of mitral valve replacement with tissue graft [Z95.4] 08/11/2017   • Precordial pain [R07.2] 08/11/2017   • Lower GI bleed [K92.2] 08/09/2017   • Long term current use of anticoagulant [Z79.01] 10/13/2016   • Pulmonary hypertension [I27.2]    • Hypertension [I10]    • COPD (chronic obstructive  pulmonary disease) [J44.9]    • PAF (paroxysmal atrial fibrillation) [I48.0] 01/25/2016      Resolved Hospital Problems    Diagnosis Date Noted Date Resolved   No resolved problems to display.       Plan:   - Anemia: GI losses. CScope with clipped bleeding hemorrhoid. SBFT with stenosis and esophagitis. EGD with gastritis and she did not require esophageal dilation. Carafate and diet added. Abd pain still present. Repeat CT pending. Hgb stable overnight.  - PAF: Rate control. AC held. Plan for discharge with ASA and off coumadin. INR still therapeutic. Cardiology following.   - Chronic Hypoxic Resp Failure/COPD/Pulm HTN: On home 3L. Imporved with increased breathing treatments. No PNA on CXR and negative procal. Will monitor and attempt to avoid po steroid due to GI risks. Pulmonology consult.  - VAN: Resolved.      Disposition  HH/possibly tomorrow, walker ordered    Nick Tovar MD  [facility]  08/19/17  12:23 PM

## 2017-08-19 NOTE — CONSULTS
[facility]  Phone: [phone]  Guanako Crenshaw MD      Subjective   LOS: 10 days     62-year-old female who follows with Dr. Anatoliy Melo in our office for COPD.  Her last office visit was 5/9/17.  She uses chronic oxygen therapy all the time.  She increases oxygen flows when she walks.  She is on Advair and Spiriva inhaler for chronic therapy.  We were consulted for increased shortness of breath and per patient request.  She denies any fever or chills.  She is not coughing up any colored phlegm.  She denies any excessive leg swelling.    Patient is currently admitted for lower GI bleed.  She had recent colonoscopy as well as upper endoscopy.  Patient informs me that further evaluation of GI bleed is pending.  She is pending a CT of the abdomen.  I believe her initial GI bleed was thought to be due to bleeding from internal hemorrhoids.    Patient quit smoking in 2007.  She had valve surgery for replacement of mitral and repair of tricuspid in 2014.     I have reviewed and edited the Past Medical History, Past Surgical History, Home Medications, Social History and Family History as of 4:48 PM on 08/19/17.    Prescriptions Prior to Admission   Medication Sig Dispense Refill Last Dose   • albuterol (PROVENTIL) (2.5 MG/3ML) 0.083% nebulizer solution Take 2.5 mg by nebulization 2 (Two) Times a Day As Needed for Wheezing.      • carvedilol (COREG) 6.25 MG tablet Take 1 tablet by mouth 2 (two) times a day with meals. 60 tablet 5 8/4/2017 at Unknown time   • cyclobenzaprine (FLEXERIL) 10 MG tablet Take 10 mg by mouth Every Night.      • enoxaparin (LOVENOX) 40 MG/0.4ML solution syringe Inject 40 mg under the skin Every 12 (Twelve) Hours.   8/9/2017 at Unknown time   • fluticasone-salmeterol (ADVAIR DISKUS) 250-50 MCG/DOSE DISKUS Inhale 1 puff 2 (Two) Times a Day. 3 each 3 8/4/2017 at Unknown time   • furosemide (LASIX) 40 MG tablet Take 1 tablet by mouth 2 (Two) Times a Day. 60 tablet 5    •  HYDROcodone-acetaminophen (NORCO) 5-325 MG per tablet Take 1 tablet by mouth Every 8 (Eight) Hours As Needed for Moderate Pain (4-6). Chronic pain medicine for low back pain 90 tablet 0 8/3/2017 at Unknown time   • lisinopril (PRINIVIL,ZESTRIL) 5 MG tablet Take 1 tablet by mouth Daily. 30 tablet 3 8/4/2017 at Unknown time   • loratadine (CLARITIN) 10 MG tablet Take 10 mg by mouth 2 (Two) Times a Day.   8/4/2017 at Unknown time   • meclizine (ANTIVERT) 25 MG tablet Take 25 mg by mouth 3 (Three) Times a Day As Needed for dizziness.      • Multiple Vitamins-Minerals (MULTIVITAL) tablet Take 1 tablet by mouth Daily.      • omeprazole (priLOSEC) 40 MG capsule Take 1 capsule by mouth Daily. 90 capsule 1    • potassium chloride (K-DUR,KLOR-CON) 20 MEQ CR tablet Take 1 tablet by mouth 2 (Two) Times a Day. 60 tablet 5    • rOPINIRole (REQUIP) 2 MG tablet Take 1 tablet by mouth Every Night. 30 tablet 5 8/3/2017 at Unknown time   • sertraline (ZOLOFT) 100 MG tablet Take 1 tablet by mouth Daily. 90 tablet 1 8/4/2017 at Unknown time   • temazepam (RESTORIL) 30 MG capsule Take 30 mg by mouth At Night As Needed for Sleep.      • tiotropium (SPIRIVA HANDIHALER) 18 MCG per inhalation capsule Place 1 capsule into inhaler and inhale Daily. 90 capsule 3 8/4/2017 at Unknown time   • warfarin (COUMADIN) 3 MG tablet Take 6 mg by mouth See Admin Instructions. Patient reports she currently takes warfarin on Mondays and Tuesdays of each week.         Allergies   Allergen Reactions   • Bupropion    • Cephalexin    • Metoprolol        Review of Systems   Constitutional: Negative for appetite change, chills, fatigue and fever.   HENT: Negative for congestion, mouth sores, postnasal drip, rhinorrhea, trouble swallowing and voice change.    Respiratory: Positive for shortness of breath and wheezing.    Cardiovascular: Negative for chest pain, palpitations and leg swelling.   Gastrointestinal: Positive for abdominal pain, anal bleeding and blood  in stool. Negative for abdominal distention, constipation, diarrhea, nausea and vomiting.   Endocrine: Negative for cold intolerance and heat intolerance.   Genitourinary: Negative for difficulty urinating, dysuria, frequency and urgency.   Musculoskeletal: Negative for arthralgias and back pain.   Skin: Negative for pallor and rash.   Neurological: Negative for dizziness, weakness and headaches.   Psychiatric/Behavioral: Negative for agitation and confusion. The patient is not nervous/anxious.        Vital Signs past 24hrs  BP range: BP: (136-158)/(63-81) 142/66  Pulse range: Heart Rate:  [60-70] 62  Resp rate range: Resp:  [16-20] 16  Temp range: Temp (24hrs), Av °F (36.7 °C), Min:97.7 °F (36.5 °C), Max:98.3 °F (36.8 °C)    Oxygen range: SpO2:  [91 %-96 %] 91 %; Flow (L/min):  [2-3] 2;   O2 Device: nasal cannula with humidification  166 lb (75.3 kg); Body mass index is 26 kg/(m^2).  I/O this shift:  In: 960 [P.O.:960]  Out: 400 [Urine:400]    Adult female who is sitting in bed.  She appears older than her stated age.  Pupils equal and reactive to light.  Oropharynx with absent dentition.  Oropharynx is moist with class III Mallampati airway.  No discharge in posterior pharynx.  Nasopharynx without discharge and septum midline.  JVP is not elevated.  Trachea is midline and thyroid is not enlarged.  Lungs reveal bilateral air entry.  Manage breath sounds.  Mild to moderate wheezing is noted posteriorly in lung fields.  No other adventitious sounds.  Percussion note is resonant bilaterally.  Chest expansion is equal with no chest wall deformities or tenderness.  Heart examination S1-S2 present with rhythm regular.  No murmurs noted.  No obvious edema in lower extremities.  Abdomen is obese, soft, nontender.  I'll sounds are present.  No liver spleen enlargement.  There is no peripheral cyanosis or clubbing.  No cervical, axillary, inguinal adenopathy noted.  Patient moves all 4 extremities and sensory, motor is  intact.    Results Review:    I have reviewed the laboratory and imaging data from current admission. My annotations are as noted in assessment and plan.    Medication Review:  I have reviewed the current MAR. My annotations are as noted in assessment and plan.    Plan   PCCM Problems  COPD exacerbation  Underlying severe COPD  Chronic hypoxic respiratory failure, on nasal cannula  Chronic hypercapnic respiratory failure  Lung nodules with demonstrated stability on previous CT  Relevant Medical Diagnoses  GI bleed  Abdominal pain  Previous valve replacement  Chronic atrial fibrillation    Plan of Treatment  She has evidence for a COPD exacerbation.  I will give her 1 dose of IV Solu-Medrol.  Start oral prednisone tomorrow.  If continued evidence wheezing she may need further IV steroids.  I will increase frequency offered your nebs to every 4 hours.  Upon discharge she will need resumption of her home inhaler treatment.  She will remain on chronic oxygen therapy as before.    Previous blood gas shows chronic hypercapnia.  She may benefit from a Trilogy device or similar at night.  She can discuss this with Dr. Anatoliy Melo.    Her chest x-ray showed only chronic changes with mild pulmonary vascular congestion.  I will defer to primary team for consideration of additional diuretics.    Gaunako Crenshaw MD  08/19/17  4:48 PM    Part of this note may be an electronic transcription/translation of spoken language to printed text using the Dragon Dictation System.

## 2017-08-19 NOTE — THERAPY EVALUATION
Acute Care - Physical Therapy Initial Evaluation  [facility]     Patient Name: Paz Browne  : 1955  MRN: [id number]  Today's Date: 2017                Admit Date: 2017     Visit Dx:    ICD-10-CM ICD-9-CM   1. Lower GI bleed K92.2 578.9   2. Dysplastic polyp of colon K63.5 211.3   3. Abnormal esophagram R93.3 793.4   4. Anemia, unspecified type D64.9 285.9   5. Difficulty walking R26.2 719.7     Patient Active Problem List   Diagnosis   • PAF (paroxysmal atrial fibrillation)   • Hypertension   • Hyperlipidemia   • COPD (chronic obstructive pulmonary disease)   • Pain medication agreement   • Hiatal hernia   • Primary osteoarthritis involving multiple joints   • Pulmonary hypertension   • S/P TVR (tricuspid valve repair)   • Long term current use of anticoagulant   • Pulmonary nodule   • Hemoptysis   • RLS (restless legs syndrome)   • Chronic low back pain   • Dysplastic polyp of colon   • Lower GI bleed   • History of mitral valve replacement with tissue graft   • Precordial pain   • Chronic respiratory failure with hypoxia   • Oral candidiasis   • VAN (acute kidney injury)   • Anemia     Past Medical History:   Diagnosis Date   • Atrial fibrillation     Fani procedure   • Atrial flutter     cardioversion   • CHF (congestive heart failure)    • Colon polyp    • COPD (chronic obstructive pulmonary disease)    • Hyperlipidemia    • Hypertension    • Mitral regurgitation    • Palpitations    • Pulmonary hypertension    • Renal failure    • Tachycardia    • Valvular heart disease      Past Surgical History:   Procedure Laterality Date   • CARDIAC CATHETERIZATION  2014    Right dominant systemt, normal coronary arteries.    • CARDIAC CATHETERIZATION Left 6/10/2016    Procedure: Cardiac catheterization;  Surgeon: Sergei Hall MD;  Location:  [facility];  Service:    • CARDIAC CATHETERIZATION N/A 6/10/2016    Procedure: Right Heart Cath;  Surgeon: Sergei Hall MD;  Location:   "[facility];  Service:    • COLONOSCOPY     • COLONOSCOPY N/A 8/4/2017    Procedure: COLONOSCOPY TO CECUM/TI WITH POLYPECTOMY ( COLD BX);  Surgeon: Cleveland Devine MD;  Location:  [facility] ENDOSCOPY;  Service:    • COLONOSCOPY N/A 8/10/2017    Procedure: COLONOSCOPY to cecum and TI with 2 clips placed at transverse;  Surgeon: Earnest PALOMO MD;  Location:  [facility] ENDOSCOPY;  Service:    • ENDOSCOPY N/A 8/17/2017    Procedure: ESOPHAGOGASTRODUODENOSCOPY;  Surgeon: Porsha Ruby MD;  Location: [facility] ENDOSCOPY;  Service:    • GALLBLADDER SURGERY     • HEMORRHOIDECTOMY     • HYSTERECTOMY     • KIDNEY SURGERY  04/22/2013   • MAZE PROCEDURE     • MITRAL VALVE REPLACEMENT     • TONSILLECTOMY     • TRICUSPID VALVE REPLACEMENT            PT ASSESSMENT (last 72 hours)      PT Evaluation       08/19/17 0925 08/18/17 1316    Rehab Evaluation    Document Type evaluation  -DO     Subjective Information complains of;weakness;fatigue  -DO     Evaluation Not Performed  patient unavailable for evaluation   Pt off floor to xray.  Will follow up tomorrow.  -LISA (r) DONAVAN (t) LISA (c)    Patient Effort, Rehab Treatment good  -DO     Symptoms Noted During/After Treatment fatigue;shortness of breath  -DO     Symptoms Noted Comment Pt did not demonstrate any adverse s/s throughout evaluation.   -DO     General Information    Precautions/Limitations fall precautions;oxygen therapy device and L/min  -DO     Prior Level of Function independent:;all household mobility;community mobility  -DO     Equipment Currently Used at Home oxygen   3L at rest, 4-5L with activity  -DO     Plans/Goals Discussed With patient  -DO     Benefits Reviewed patient:  -DO     Barriers to Rehab none identified  -DO     Living Environment    Lives With spouse  -DO     Living Arrangements house  -DO     Home Accessibility stairs to enter home  -DO     Number of Stairs to Enter Home --   If entering through the basement, \"a few\"  -DO     Type of " Financial/Environmental Concern none  -DO     Transportation Available car;family or friend will provide  -DO     Clinical Impression    Criteria for Skilled Therapeutic Interventions Met yes  -DO     Pathology/Pathophysiology Noted (Describe Specifically for Each System) musculoskeletal;pulmonary  -DO     Impairments Found (describe specific impairments) aerobic capacity/endurance;gait, locomotion, and balance;muscle performance  -DO     Functional Limitations in Following Categories (Describe Specific Limitations) self-care;home management;community/leisure  -DO     Rehab Potential good, to achieve stated therapy goals  -DO     Pain Assessment    Pain Assessment No/denies pain  -DO     Vision Assessment/Intervention    Visual Impairment WFL  -DO     Cognitive Assessment/Intervention    Current Cognitive/Communication Assessment functional  -DO     Orientation Status oriented x 4  -DO     Follows Commands/Answers Questions 100% of the time;able to follow multi-step instructions  -DO     Personal Safety WNL/WFL  -DO     Personal Safety Interventions fall prevention program maintained;gait belt;muscle strengthening facilitated;nonskid shoes/slippers when out of bed  -DO     ROM (Range of Motion)    General ROM no range of motion deficits identified  -DO     MMT (Manual Muscle Testing)    General MMT Assessment Detail Pt demonstrates functional strength of at least 3-/5 throughout BUE/BLE.   -DO     Bed Mobility, Assessment/Treatment    Bed Mobility, Assistive Device bed rails  -DO     Bed Mob, Supine to Sit, Piatt supervision required;verbal cues required  -DO     Bed Mob, Sit to Supine, Piatt minimum assist (75% patient effort)  -DO     Bed Mobility, Impairments strength decreased  -DO     Bed Mobility, Comment Required Rosemary at BLE to lift back onto bed when going sit-supine.  -DO     Transfer Assessment/Treatment    Transfers, Sit-Stand Piatt contact guard assist  -DO     Transfers, Stand-Sit  Thompson contact guard assist  -DO     Transfers, Sit-Stand-Sit, Assist Device rolling walker  -DO     Transfer, Safety Issues step length decreased  -DO     Transfer, Impairments strength decreased  -DO     Gait Assessment/Treatment    Gait, Thompson Level contact guard assist  -DO     Gait, Assistive Device rolling walker  -DO     Gait, Distance (Feet) --   20 ft, seated rest break, 12 ft  -DO     Gait, Gait Deviations step length decreased;stride length decreased;kassidy decreased  -DO     Gait, Safety Issues step length decreased;supplemental O2   4L  -DO     Gait, Impairments strength decreased  -DO     Gait, Comment Pt able to ambulate 20 ft with CGA and use of Rwx. She required a seated rest break prior to ambulating an additional 12 ft to the bed for transport. She becomes easily fatigued and requires verbal cueing for breathing pattern during ambulation.  -DO     Positioning and Restraints    Pre-Treatment Position in bed  -DO     Post Treatment Position bed  -DO     In Bed fowlers;with other staff   Pt left with transport.   -DO       User Key  (r) = Recorded By, (t) = Taken By, (c) = Cosigned By    Initials Name Provider Type    LISA Orozco, PT Physical Therapist    DO Jules Parra, PT Physical Therapist    DONAVAN Solis, PT Student PT Student          Physical Therapy Education     Title: PT OT SLP Therapies (Done)     Topic: Physical Therapy (Done)     Point: Mobility training (Done)    Learning Progress Summary    Learner Readiness Method Response Comment Documented by Status   Patient Acceptance EFFIE LEIGH DU DO 08/19/17 0953 Done               Point: Home exercise program (Done)    Learning Progress Summary    Learner Readiness Method Response Comment Documented by Status   Patient Acceptance EFFIE LEIGH DU DO 08/19/17 0953 Done               Point: Body mechanics (Done)    Learning Progress Summary    Learner Readiness Method Response Comment Documented by Status   Patient  Acceptance EFFIE LEIGH DU  DO 08/19/17 0953 Done               Point: Precautions (Done)    Learning Progress Summary    Learner Readiness Method Response Comment Documented by Status   Patient Acceptance EFFIE LEIGH DU  DO 08/19/17 0953 Done                      User Key     Initials Effective Dates Name Provider Type Discipline    DO 01/27/16 -  Jules Parra, PT Physical Therapist PT                PT Recommendation and Plan  Anticipated Equipment Needs At Discharge: front wheeled walker  Anticipated Discharge Disposition: home with assist, home with home health  Planned Therapy Interventions: bed mobility training, gait training, strengthening, transfer training  PT Frequency: daily  Plan of Care Review  Plan Of Care Reviewed With: patient  Outcome Summary/Follow up Plan: Pt able to demonstrate bed mobility, transfers, and ambulation with use of Rwx and CGA. She ambulated 20 ft with supplemental O2 and Rwx with CGA, requiring a seated rest break following performance secondary to fatigue. Pt was notably fatigued/SOA following ambulation and required verbal cueing for pursed lip breathing to maintain O2 sats. Pt ended session in bed with transport. Due to noted weakness in BLE and overall decreased aerobic endurance, pt will benefit from continuing skilled PT while at [facility] in order to improve independence with functional mobility and to decrease falls risk prior to d/c. PT recommends d/c home with assistance, along with a home health evaluation for safety.           IP PT Goals       08/19/17 0953          Bed Mobility PT LTG    Bed Mobility PT LTG, Date Established 08/19/17  -DO      Bed Mobility PT LTG, Time to Achieve 1 wk  -DO      Bed Mobility PT LTG, Activity Type all bed mobility  -DO      Bed Mobility PT LTG, Becker Level independent  -DO      Transfer Training PT LTG    Transfer Training PT LTG, Date Established 08/19/17  -DO      Transfer Training PT LTG, Time to Achieve 1 wk  -DO      Transfer Training PT  LTG, Activity Type all transfers  -DO      Transfer Training PT LTG, [place] Level conditional independence  -DO      Transfer Training PT LTG, Assist Device walker, rolling  -DO      Gait Training PT LTG    Gait Training Goal PT LTG, Date Established 08/19/17  -DO      Gait Training Goal PT LTG, Time to Achieve 1 wk  -DO      Gait Training Goal PT LTG, [place] Level conditional independence  -DO      Gait Training Goal PT LTG, Assist Device walker, rolling  -DO      Gait Training Goal PT LTG, Distance to Achieve 150 ft  -DO        User Key  (r) = Recorded By, (t) = Taken By, (c) = Cosigned By    Initials Name Provider Type    DO Jules Parra PT Physical Therapist                Outcome Measures       08/19/17 0900          How much help from another person do you currently need...    Turning from your back to your side while in flat bed without using bedrails? 4  -DO      Moving from lying on back to sitting on the side of a flat bed without bedrails? 3  -DO      Moving to and from a bed to a chair (including a wheelchair)? 3  -DO      Standing up from a chair using your arms (e.g., wheelchair, bedside chair)? 3  -DO      Climbing 3-5 steps with a railing? 2  -DO      To walk in hospital room? 3  -DO      AM-PAC 6 Clicks Score 18  -DO      Functional Assessment    Outcome Measure Options AM-PAC 6 Clicks Basic Mobility (PT)  -DO        User Key  (r) = Recorded By, (t) = Taken By, (c) = Cosigned By    Initials Name Provider Type    DO Jules Parra PT Physical Therapist           Time Calculation:         PT Charges       08/19/17 0958          Time Calculation    Start Time 0925  -DO      Stop Time 0939  -DO      Time Calculation (min) 14 min  -DO      PT Received On 08/19/17  -DO      PT - Next Appointment 08/20/17  -DO      PT Goal Re-Cert Due Date 08/26/17  -DO        User Key  (r) = Recorded By, (t) = Taken By, (c) = Cosigned By    Initials Name Provider Type    DO Jules Parra PT Physical  Therapist          Therapy Charges for Today     Code Description Service Date Service Provider Modifiers Qty    49540699796 HC PT EVAL MOD COMPLEXITY 2 8/19/2017 Jules Parra, PT GP 1    49708514539 HC PT THER PROC EA 15 MIN 8/19/2017 Jules Parra, PT GP 1          PT G-Codes  Outcome Measure Options: AM-PAC 6 Clicks Basic Mobility (PT)      Jules Parra, PT  8/19/2017

## 2017-08-20 ENCOUNTER — APPOINTMENT (OUTPATIENT)
Dept: GENERAL RADIOLOGY | Facility: HOSPITAL | Age: 62
End: 2017-08-20

## 2017-08-20 LAB
ANION GAP SERPL CALCULATED.3IONS-SCNC: 18 MMOL/L
BASOPHILS # BLD AUTO: 0 10*3/MM3 (ref 0–0.2)
BASOPHILS NFR BLD AUTO: 0 % (ref 0–1.5)
BUN BLD-MCNC: 12 MG/DL (ref 8–23)
BUN/CREAT SERPL: 14 (ref 7–25)
CALCIUM SPEC-SCNC: 9.5 MG/DL (ref 8.6–10.5)
CHLORIDE SERPL-SCNC: 96 MMOL/L (ref 98–107)
CO2 SERPL-SCNC: 25 MMOL/L (ref 22–29)
CREAT BLD-MCNC: 0.86 MG/DL (ref 0.57–1)
DEPRECATED RDW RBC AUTO: 46.4 FL (ref 37–54)
EOSINOPHIL # BLD AUTO: 0.01 10*3/MM3 (ref 0–0.7)
EOSINOPHIL NFR BLD AUTO: 0.1 % (ref 0.3–6.2)
ERYTHROCYTE [DISTWIDTH] IN BLOOD BY AUTOMATED COUNT: 14.3 % (ref 11.7–13)
GFR SERPL CREATININE-BSD FRML MDRD: 66 ML/MIN/1.73
GLUCOSE BLD-MCNC: 145 MG/DL (ref 65–99)
HCT VFR BLD AUTO: 31.8 % (ref 35.6–45.5)
HGB BLD-MCNC: 9.8 G/DL (ref 11.9–15.5)
IMM GRANULOCYTES # BLD: 0.02 10*3/MM3 (ref 0–0.03)
IMM GRANULOCYTES NFR BLD: 0.2 % (ref 0–0.5)
INR PPP: 2.08 (ref 0.9–1.1)
LYMPHOCYTES # BLD AUTO: 0.65 10*3/MM3 (ref 0.9–4.8)
LYMPHOCYTES NFR BLD AUTO: 7.2 % (ref 19.6–45.3)
MCH RBC QN AUTO: 27.4 PG (ref 26.9–32)
MCHC RBC AUTO-ENTMCNC: 30.8 G/DL (ref 32.4–36.3)
MCV RBC AUTO: 88.8 FL (ref 80.5–98.2)
MONOCYTES # BLD AUTO: 0.13 10*3/MM3 (ref 0.2–1.2)
MONOCYTES NFR BLD AUTO: 1.4 % (ref 5–12)
NEUTROPHILS # BLD AUTO: 8.28 10*3/MM3 (ref 1.9–8.1)
NEUTROPHILS NFR BLD AUTO: 91.1 % (ref 42.7–76)
PLATELET # BLD AUTO: 271 10*3/MM3 (ref 140–500)
PMV BLD AUTO: 10.9 FL (ref 6–12)
POTASSIUM BLD-SCNC: 3.9 MMOL/L (ref 3.5–5.2)
PROTHROMBIN TIME: 22.7 SECONDS (ref 11.7–14.2)
RBC # BLD AUTO: 3.58 10*6/MM3 (ref 3.9–5.2)
SODIUM BLD-SCNC: 139 MMOL/L (ref 136–145)
WBC NRBC COR # BLD: 9.09 10*3/MM3 (ref 4.5–10.7)

## 2017-08-20 PROCEDURE — 97110 THERAPEUTIC EXERCISES: CPT

## 2017-08-20 PROCEDURE — 25010000002 MORPHINE SULFATE (PF) 2 MG/ML SOLUTION: Performed by: INTERNAL MEDICINE

## 2017-08-20 PROCEDURE — 85025 COMPLETE CBC W/AUTO DIFF WBC: CPT | Performed by: INTERNAL MEDICINE

## 2017-08-20 PROCEDURE — 85610 PROTHROMBIN TIME: CPT | Performed by: INTERNAL MEDICINE

## 2017-08-20 PROCEDURE — 74000 HC ABDOMEN KUB: CPT

## 2017-08-20 PROCEDURE — 94799 UNLISTED PULMONARY SVC/PX: CPT

## 2017-08-20 PROCEDURE — 99232 SBSQ HOSP IP/OBS MODERATE 35: CPT | Performed by: INTERNAL MEDICINE

## 2017-08-20 PROCEDURE — 63710000001 PREDNISONE PER 1 MG: Performed by: INTERNAL MEDICINE

## 2017-08-20 PROCEDURE — 80048 BASIC METABOLIC PNL TOTAL CA: CPT | Performed by: INTERNAL MEDICINE

## 2017-08-20 RX ORDER — MAGNESIUM CARB/ALUMINUM HYDROX 105-160MG
296 TABLET,CHEWABLE ORAL ONCE
Status: COMPLETED | OUTPATIENT
Start: 2017-08-20 | End: 2017-08-20

## 2017-08-20 RX ORDER — MORPHINE SULFATE 2 MG/ML
1 INJECTION, SOLUTION INTRAMUSCULAR; INTRAVENOUS EVERY 4 HOURS PRN
Status: DISCONTINUED | OUTPATIENT
Start: 2017-08-20 | End: 2017-08-25 | Stop reason: HOSPADM

## 2017-08-20 RX ADMIN — NYSTATIN 500000 UNITS: 100000 SUSPENSION ORAL at 09:49

## 2017-08-20 RX ADMIN — HYDROCODONE BITARTRATE AND ACETAMINOPHEN 1 TABLET: 5; 325 TABLET ORAL at 04:40

## 2017-08-20 RX ADMIN — CARVEDILOL 12.5 MG: 12.5 TABLET, FILM COATED ORAL at 17:54

## 2017-08-20 RX ADMIN — MULTIPLE VITAMINS W/ MINERALS TAB 1 TABLET: TAB at 09:49

## 2017-08-20 RX ADMIN — ATORVASTATIN CALCIUM 40 MG: 20 TABLET, FILM COATED ORAL at 09:49

## 2017-08-20 RX ADMIN — HYDROCODONE BITARTRATE AND ACETAMINOPHEN 1 TABLET: 5; 325 TABLET ORAL at 13:28

## 2017-08-20 RX ADMIN — CETIRIZINE HYDROCHLORIDE 10 MG: 10 TABLET, FILM COATED ORAL at 09:49

## 2017-08-20 RX ADMIN — IPRATROPIUM BROMIDE AND ALBUTEROL SULFATE 3 ML: .5; 3 SOLUTION RESPIRATORY (INHALATION) at 10:58

## 2017-08-20 RX ADMIN — ROPINIROLE 2 MG: 2 TABLET, FILM COATED ORAL at 20:09

## 2017-08-20 RX ADMIN — IPRATROPIUM BROMIDE AND ALBUTEROL SULFATE 3 ML: .5; 3 SOLUTION RESPIRATORY (INHALATION) at 23:59

## 2017-08-20 RX ADMIN — FERROUS SULFATE TAB 325 MG (65 MG ELEMENTAL FE) 325 MG: 325 (65 FE) TAB at 17:54

## 2017-08-20 RX ADMIN — SERTRALINE 100 MG: 100 TABLET, FILM COATED ORAL at 09:49

## 2017-08-20 RX ADMIN — NYSTATIN 500000 UNITS: 100000 SUSPENSION ORAL at 20:10

## 2017-08-20 RX ADMIN — CYCLOBENZAPRINE HYDROCHLORIDE 10 MG: 10 TABLET, FILM COATED ORAL at 20:09

## 2017-08-20 RX ADMIN — IPRATROPIUM BROMIDE AND ALBUTEROL SULFATE 3 ML: .5; 3 SOLUTION RESPIRATORY (INHALATION) at 07:22

## 2017-08-20 RX ADMIN — PANTOPRAZOLE SODIUM 40 MG: 40 TABLET, DELAYED RELEASE ORAL at 07:07

## 2017-08-20 RX ADMIN — CARVEDILOL 12.5 MG: 12.5 TABLET, FILM COATED ORAL at 09:49

## 2017-08-20 RX ADMIN — MORPHINE SULFATE 1 MG: 2 INJECTION, SOLUTION INTRAMUSCULAR; INTRAVENOUS at 20:08

## 2017-08-20 RX ADMIN — LISINOPRIL 5 MG: 5 TABLET ORAL at 09:49

## 2017-08-20 RX ADMIN — MORPHINE SULFATE 1 MG: 2 INJECTION, SOLUTION INTRAMUSCULAR; INTRAVENOUS at 15:27

## 2017-08-20 RX ADMIN — FUROSEMIDE 40 MG: 40 TABLET ORAL at 09:49

## 2017-08-20 RX ADMIN — NYSTATIN 500000 UNITS: 100000 SUSPENSION ORAL at 13:28

## 2017-08-20 RX ADMIN — TEMAZEPAM 30 MG: 15 CAPSULE ORAL at 23:34

## 2017-08-20 RX ADMIN — GUAIFENESIN 600 MG: 600 TABLET, EXTENDED RELEASE ORAL at 09:49

## 2017-08-20 RX ADMIN — SUCRALFATE 1 G: 1 SUSPENSION ORAL at 13:28

## 2017-08-20 RX ADMIN — IPRATROPIUM BROMIDE AND ALBUTEROL SULFATE 3 ML: .5; 3 SOLUTION RESPIRATORY (INHALATION) at 15:51

## 2017-08-20 RX ADMIN — FERROUS SULFATE TAB 325 MG (65 MG ELEMENTAL FE) 325 MG: 325 (65 FE) TAB at 09:49

## 2017-08-20 RX ADMIN — IPRATROPIUM BROMIDE AND ALBUTEROL SULFATE 3 ML: .5; 3 SOLUTION RESPIRATORY (INHALATION) at 20:23

## 2017-08-20 RX ADMIN — HYDROCODONE BITARTRATE AND ACETAMINOPHEN 1 TABLET: 5; 325 TABLET ORAL at 23:34

## 2017-08-20 RX ADMIN — SUCRALFATE 1 G: 1 SUSPENSION ORAL at 23:35

## 2017-08-20 RX ADMIN — GUAIFENESIN 600 MG: 600 TABLET, EXTENDED RELEASE ORAL at 20:09

## 2017-08-20 RX ADMIN — BUDESONIDE AND FORMOTEROL FUMARATE DIHYDRATE 2 PUFF: 160; 4.5 AEROSOL RESPIRATORY (INHALATION) at 20:29

## 2017-08-20 RX ADMIN — HYDROCODONE BITARTRATE AND ACETAMINOPHEN 1 TABLET: 5; 325 TABLET ORAL at 17:54

## 2017-08-20 RX ADMIN — SUCRALFATE 1 G: 1 SUSPENSION ORAL at 07:07

## 2017-08-20 RX ADMIN — PREDNISONE 40 MG: 20 TABLET ORAL at 09:49

## 2017-08-20 RX ADMIN — HYDROCODONE BITARTRATE AND ACETAMINOPHEN 1 TABLET: 5; 325 TABLET ORAL at 09:48

## 2017-08-20 RX ADMIN — NYSTATIN 500000 UNITS: 100000 SUSPENSION ORAL at 17:55

## 2017-08-20 RX ADMIN — Medication 296 ML: at 17:55

## 2017-08-20 RX ADMIN — SUCRALFATE 1 G: 1 SUSPENSION ORAL at 17:55

## 2017-08-20 RX ADMIN — FUROSEMIDE 40 MG: 40 TABLET ORAL at 17:54

## 2017-08-20 RX ADMIN — BUDESONIDE AND FORMOTEROL FUMARATE DIHYDRATE 2 PUFF: 160; 4.5 AEROSOL RESPIRATORY (INHALATION) at 07:21

## 2017-08-20 RX ADMIN — IPRATROPIUM BROMIDE AND ALBUTEROL SULFATE 3 ML: .5; 3 SOLUTION RESPIRATORY (INHALATION) at 03:56

## 2017-08-20 NOTE — PROGRESS NOTES
Name: Paz Browne ADMIT: 2017   : 1955  PCP: Javan Martinez MD    MRN: [id number] LOS: 11 days   AGE/SEX: 62 y.o. female  ROOM: [facility]/   Subjective   Subjective  Pain continued, right sided abd. Worse with cough. PO medication improved some but currently 9/10. No CP NVD.    Objective   Vital Signs  Temp:  [97.6 °F (36.4 °C)-98.3 °F (36.8 °C)] 97.8 °F (36.6 °C)  Heart Rate:  [52-92] 92  Resp:  [16-18] 18  BP: (125-169)/(61-93) 168/66  SpO2:  [91 %-100 %] 98 %  on  Flow (L/min):  [2-3] 3;   O2 Device: nasal cannula with humidification  Body mass index is 26 kg/(m^2).    Physical Exam   Constitutional: She appears well-developed and well-nourished. No distress.   HENT:   Head: Normocephalic and atraumatic.   Eyes: EOM are normal. Pupils are equal, round, and reactive to light.   Neck: Normal range of motion. Neck supple.   Cardiovascular: Normal rate, regular rhythm and intact distal pulses.    Pulmonary/Chest: Effort normal. She has decreased breath sounds. She has wheezes (mild end expiratory, L>R). She has no rales.   Abdominal: Soft. There is no tenderness. There is no rebound and no guarding.   Musculoskeletal: Normal range of motion. She exhibits no edema.   Neurological: She is alert. No cranial nerve deficit.   Skin: Skin is warm and dry. She is not diaphoretic.   Psychiatric: She has a normal mood and affect. Her behavior is normal.   Nursing note and vitals reviewed.      Results Review:       I reviewed the patient's new clinical results. Reviewed telemetry, sinus rhythm.    Results from last 7 days  Lab Units 17  0431 17  0440 17  0334 17  0743 17  0349 08/15/17  0451 17  0334   WBC 10*3/mm3 9.09 7.63 5.99 5.55 7.41 7.40 8.06   HEMOGLOBIN g/dL 9.8* 8.8* 8.6* 8.6* 8.8* 8.0* 8.6*   PLATELETS 10*3/mm3 271 198 181 165 176 173 197     Results from last 7 days  Lab Units 17  0431 17  0440 17  0334 17  0743 17  0349  08/15/17  0451   SODIUM mmol/L 139 140 140 142 133* 135*   POTASSIUM mmol/L 3.9 3.5 3.8 3.8 4.1 4.0   CHLORIDE mmol/L 96* 98 101 102 95* 92*   CO2 mmol/L 25.0 26.9 25.3 28.0 26.1 27.6   BUN mg/dL 12 16 17 23 41* 38*   CREATININE mg/dL 0.86 0.90 0.87 1.00 1.70* 2.15*   GLUCOSE mg/dL 145* 111* 105* 107* 109* 110*   Estimated Creatinine Clearance: 70 mL/min (by C-G formula based on Cr of 0.86).  Results from last 7 days  Lab Units 08/20/17  0431 08/19/17  0440 08/18/17  0334 08/17/17  0743 08/16/17  1201 08/16/17  0349 08/15/17  0451   CALCIUM mg/dL 9.5 9.3 9.0 8.7  --  8.5* 8.6   ALBUMIN g/dL  --  4.10  --  4.00 3.80  --   --          atorvastatin 40 mg Oral Daily   budesonide-formoterol 2 puff Inhalation BID - RT   carvedilol 12.5 mg Oral BID With Meals   cetirizine 10 mg Oral Daily   cyclobenzaprine 10 mg Oral Nightly   ferrous sulfate 325 mg Oral BID With Meals   furosemide 40 mg Oral BID   guaiFENesin 600 mg Oral Q12H   hydrocortisone 25 mg Rectal BID   ipratropium-albuterol 3 mL Nebulization Q4H - RT   lisinopril 5 mg Oral Q24H   multivitamin with minerals 1 tablet Oral Daily   nystatin 5 mL Oral 4x Daily   pantoprazole 40 mg Oral QAM   polyethylene glycol 17 g Oral Daily   predniSONE 40 mg Oral Daily With Breakfast   rOPINIRole 2 mg Oral Nightly   sertraline 100 mg Oral Daily   sucralfate 1 g Oral Q6H       sodium chloride 30 mL/hr Last Rate: 30 mL/hr (08/17/17 1059)   Diet Regular; Cardiac      Assessment/Plan   Assessment:     Active Hospital Problems (** Indicates Principal Problem)    Diagnosis Date Noted   • **Anemia [D64.9] 08/09/2017   • VAN (acute kidney injury) [N17.9] 08/15/2017   • Oral candidiasis [B37.0] 08/14/2017   • Chronic respiratory failure with hypoxia [J96.11] 08/13/2017   • History of mitral valve replacement with tissue graft [Z95.4] 08/11/2017   • Precordial pain [R07.2] 08/11/2017   • Lower GI bleed [K92.2] 08/09/2017   • Long term current use of anticoagulant [Z79.01] 10/13/2016   •  Pulmonary hypertension [I27.2]    • Hypertension [I10]    • COPD (chronic obstructive pulmonary disease) [J44.9]    • PAF (paroxysmal atrial fibrillation) [I48.0] 01/25/2016      Resolved Hospital Problems    Diagnosis Date Noted Date Resolved   No resolved problems to display.       Plan:   - Anemia: GI losses. CScope with clipped bleeding hemorrhoid. SBFT with stenosis and esophagitis. EGD with gastritis and she did not require esophageal dilation. Carafate and diet added. Abd pain still present. Repeat CT pending. Hgb stable overnight. GI following.  - PAF: Rate control. AC held. Plan for discharge with ASA and off coumadin. INR now decreasing. Cardiology following.   - COPD AE/Chronic Hypoxic Resp Failure/Pulm HTN: On home 3L. Steroid started yesterday. Increased breathing treatment. Discussed that her Duoneb has same medication class and tiotropium so that is being held. PO steroid. Pulmonology following.  - VAN: Resolved.    Disposition  HH/possibly tomorrow    Nick Tovar MD  [facility]  08/20/17  12:54 PM

## 2017-08-20 NOTE — PLAN OF CARE
Problem: Patient Care Overview (Adult)  Goal: Plan of Care Review  Outcome: Ongoing (interventions implemented as appropriate)    17 0430   Coping/Psychosocial Response Interventions   Plan Of Care Reviewed With patient   Patient Care Overview   Progress improving   Outcome Evaluation   Outcome Summary/Follow up Plan less wheezing noted in lungs less short of breath morphine order  tolerated being on po pain med without the iv          Problem: Fall Risk (Adult)  Goal: Identify Related Risk Factors and Signs and Symptoms  Outcome: Ongoing (interventions implemented as appropriate)    Problem: Pain, Acute (Adult)  Goal: Identify Related Risk Factors and Signs and Symptoms  Outcome: Ongoing (interventions implemented as appropriate)    Problem: GI Endoscopy (Adult)  Goal: Signs and Symptoms of Listed Potential Problems Will be Absent or Manageable (GI Endoscopy)  Outcome: Ongoing (interventions implemented as appropriate)    Problem: Respiratory Insufficiency (Adult)  Goal: Identify Related Risk Factors and Signs and Symptoms  Outcome: Ongoing (interventions implemented as appropriate)

## 2017-08-20 NOTE — PLAN OF CARE
Problem: Inpatient Physical Therapy  Goal: Bed Mobility Goal LTG- PT  Outcome: Outcome(s) achieved Date Met:  08/20/17

## 2017-08-20 NOTE — PLAN OF CARE
Problem: Inpatient Physical Therapy  Goal: Transfer Training Goal 1 LTG- PT  Outcome: Outcome(s) achieved Date Met:  08/20/17

## 2017-08-20 NOTE — THERAPY TREATMENT NOTE
Acute Care - Physical Therapy Treatment Note  [facility]     Patient Name: Paz Browne  : 1955  MRN: [id number]  Today's Date: 2017             Admit Date: 2017    Visit Dx:    ICD-10-CM ICD-9-CM   1. Lower GI bleed K92.2 578.9   2. Dysplastic polyp of colon K63.5 211.3   3. Abnormal esophagram R93.3 793.4   4. Anemia, unspecified type D64.9 285.9   5. Difficulty walking R26.2 719.7   6. Chronic respiratory failure with hypoxia J96.11 518.83     799.02   7. Primary osteoarthritis involving multiple joints M15.0 715.09     Patient Active Problem List   Diagnosis   • PAF (paroxysmal atrial fibrillation)   • Hypertension   • Hyperlipidemia   • COPD (chronic obstructive pulmonary disease)   • Pain medication agreement   • Hiatal hernia   • Primary osteoarthritis involving multiple joints   • Pulmonary hypertension   • S/P TVR (tricuspid valve repair)   • Long term current use of anticoagulant   • Pulmonary nodule   • Hemoptysis   • RLS (restless legs syndrome)   • Chronic low back pain   • Dysplastic polyp of colon   • Lower GI bleed   • History of mitral valve replacement with tissue graft   • Precordial pain   • Chronic respiratory failure with hypoxia   • Oral candidiasis   • VAN (acute kidney injury)   • Anemia               Adult Rehabilitation Note       17 1100          Rehab Assessment/Intervention    Discipline physical therapy assistant  -SI      Document Type therapy note (daily note)  -SI      Subjective Information agree to therapy   Pt reports at home on 2 L/min O2 at rest, 5 L/min when oob  -SI      Patient Effort, Rehab Treatment excellent  -SI      Symptoms Noted During/After Treatment fatigue   some wheezing  -SI      Symptoms Noted Comment one standing rest break   -SI      Precautions/Limitations fall precautions;oxygen therapy device and L/min  -SI      Recorded by [SI] Amrita Loredo PTA      Vital Signs    Pre SpO2 (%) 94  -SI      O2 Delivery Pre Treatment  supplemental O2  -SI      Post SpO2 (%) 96  -SI      O2 Delivery Post Treatment supplemental O2   5 L/min  -SI      Recorded by [SI] Amrita Loredo PTA      Pain Assessment    Pain Assessment Rolle-Sandoval FACES  -SI      Rolle-Sandoval FACES Pain Rating 2  -SI      Pain Type Chronic pain  -SI      Pain Location Abdomen  -SI      Pain Orientation Left  -SI      Recorded by [SI] Amrita Loredo PTA      Cognitive Assessment/Intervention    Current Cognitive/Communication Assessment functional  -SI      Follows Commands/Answers Questions 100% of the time  -SI      Personal Safety WNL/WFL  -SI      Personal Safety Interventions fall prevention program maintained  -SI      Recorded by [SI] Amrita Loredo PTA      Bed Mobility, Assessment/Treatment    Bed Mobility, Assistive Device bed rails  -SI      Bed Mob, Supine to Sit, [place] supervision required  -SI      Bed Mob, Sit to Supine, [place] supervision required  -SI      Recorded by [SI] Amrita Loredo PTA      Transfer Assessment/Treatment    Transfers, Sit-Stand [place] contact guard assist  -SI      Transfers, Stand-Sit [place] contact guard assist  -SI      Transfers, Sit-Stand-Sit, Assist Device rolling walker  -SI      Recorded by [SI] Amrita Loredo PTA      Gait Assessment/Treatment    Gait, [place] Level contact guard assist  -SI      Gait, Assistive Device rolling walker  -SI      Gait, Distance (Feet) 150   one standing rest break  -SI      Gait, Gait Deviations --   slow  -SI      Gait, Safety Issues supplemental O2;step length decreased  -SI      Gait, Comment Fatigued but she was pleased and eager for activity  -SI      Recorded by [SI] Amrita Loredo PTA      Positioning and Restraints    Pre-Treatment Position in bed  -SI      Post Treatment Position --   sitting EOB for lunch and with   -SI      In Bed sitting EOB;call light within reach;with family/caregiver  -SI      Recorded by [SI] Amrita Loredo PTA        User Avalos   (r) = Recorded By, (t) = Taken By, (c) = Cosigned By    Initials Name Effective Dates    SI Amrita Loredo, PTA 05/18/15 -                 IP PT Goals       08/19/17 0953          Bed Mobility PT LTG    Bed Mobility PT LTG, Date Established 08/19/17  -DO      Bed Mobility PT LTG, Time to Achieve 1 wk  -DO      Bed Mobility PT LTG, Activity Type all bed mobility  -DO      Bed Mobility PT LTG, [place] Level independent  -DO      Transfer Training PT LTG    Transfer Training PT LTG, Date Established 08/19/17  -DO      Transfer Training PT LTG, Time to Achieve 1 wk  -DO      Transfer Training PT LTG, Activity Type all transfers  -DO      Transfer Training PT LTG, [place] Level conditional independence  -DO      Transfer Training PT LTG, Assist Device walker, rolling  -DO      Gait Training PT LTG    Gait Training Goal PT LTG, Date Established 08/19/17  -DO      Gait Training Goal PT LTG, Time to Achieve 1 wk  -DO      Gait Training Goal PT LTG, [place] Level conditional independence  -DO      Gait Training Goal PT LTG, Assist Device walker, rolling  -DO      Gait Training Goal PT LTG, Distance to Achieve 150 ft  -DO        User Key  (r) = Recorded By, (t) = Taken By, (c) = Cosigned By    Initials Name Provider Type    DO Jules Parra PT Physical Therapist          Physical Therapy Education     Title: PT OT SLP Therapies (Done)     Topic: Physical Therapy (Done)     Point: Mobility training (Done)    Learning Progress Summary    Learner Readiness Method Response Comment Documented by Status   Patient Eager E DU mobility and safety SI 08/20/17 1150 Done    Acceptance EFFIE LEIGH DU DO 08/19/17 0953 Done               Point: Home exercise program (Done)    Learning Progress Summary    Learner Readiness Method Response Comment Documented by Status   Patient Eager E DU mobility and safety SI 08/20/17 1150 Done    Acceptance EEFFIE DU  DO 08/19/17 0953 Done               Point: Body mechanics (Done)     Learning Progress Summary    Learner Readiness Method Response Comment Documented by Status   Patient Eager E DU mobility and safety SI 08/20/17 1150 Done    Acceptance EEFFIE DEREK DE LA FUENTE  DO 08/19/17 0953 Done               Point: Precautions (Done)    Learning Progress Summary    Learner Readiness Method Response Comment Documented by Status   Patient Eager E DU mobility and safety SI 08/20/17 1150 Done    Acceptance E,D DEREK DE [place]UENTE  DO 08/19/17 0953 Done                      User Key     Initials Effective Dates Name Provider Type Discipline    SI 05/18/15 -  Amrita Loredo PTA Physical Therapy Assistant PT    DO 01/27/16 -  Jules Parra, MILY Physical Therapist PT                    PT Recommendation and Plan  Anticipated Equipment Needs At Discharge: front wheeled walker  Anticipated Discharge Disposition: home with assist, home with home health  Planned Therapy Interventions: bed mobility training, gait training, strengthening, transfer training  PT Frequency: daily             Outcome Measures       08/20/17 1100 08/19/17 0900       How much help from another person do you currently need...    Turning from your back to your side while in flat bed without using bedrails? 4  -SI 4  -DO     Moving from lying on back to sitting on the side of a flat bed without bedrails? 4  -SI 3  -DO     Moving to and from a bed to a chair (including a wheelchair)? 3  -SI 3  -DO     Standing up from a chair using your arms (e.g., wheelchair, bedside chair)? 3  -SI 3  -DO     Climbing 3-5 steps with a railing? 2  -SI 2  -DO     To walk in hospital room? 3  -SI 3  -DO     AM-PAC 6 Clicks Score 19  -SI 18  -DO     Functional Assessment    Outcome Measure Options AM-PAC 6 Clicks Basic Mobility (PT)  -SI AM-PAC 6 Clicks Basic Mobility (PT)  -DO       User Key  (r) = Recorded By, (t) = Taken By, (c) = Cosigned By    Initials Name Provider Type    RAKAN Loredo PTA Physical Therapy Assistant    DO Jules Parra, PT Physical Therapist            Time Calculation:         PT Charges       08/20/17 1151          Time Calculation    Start Time 1130  -SI      Stop Time 1145  -SI      Time Calculation (min) 15 min  -SI      PT Received On 08/20/17  -SI      PT - Next Appointment 08/21/17  -SI        User Key  (r) = Recorded By, (t) = Taken By, (c) = Cosigned By    Initials Name Provider Type    SI Amrita Loredo PTA Physical Therapy Assistant          Therapy Charges for Today     Code Description Service Date Service Provider Modifiers Qty    79054597694 HC PT THER PROC EA 15 MIN 8/20/2017 Amrita Loredo PTA GP 1          PT G-Codes  Outcome Measure Options: AM-PAC 6 Clicks Basic Mobility (PT)    Amrita Loredo PTA  8/20/2017

## 2017-08-20 NOTE — PLAN OF CARE
Problem: Inpatient Physical Therapy  Goal: Gait Training Goal LTG- PT  Outcome: Ongoing (interventions implemented as appropriate)

## 2017-08-20 NOTE — PROGRESS NOTES
[facility]  Phone: [phone]  Guanako Crenshaw MD    Subjective:  LOS: 11    She feels somewhat better today but still with wheezing.  Continues to complain of abdominal pain.    Objective   Vital Signs past 24hrs  BP range: BP: (125-169)/(61-93) 168/66  Pulse range: Heart Rate:  [52-92] 92  Resp rate range: Resp:  [16-18] 18  Temp range: Temp (24hrs), Av °F (36.7 °C), Min:97.6 °F (36.4 °C), Max:98.3 °F (36.8 °C)    O2 Device: nasal cannula with humidificationFlow (L/min):  [2-3] 3  Oxygen range:SpO2:  [91 %-100 %] 98 %   166 lb (75.3 kg); Body mass index is 26 kg/(m^2).    Intake/Output Summary (Last 24 hours) at 17 1412  Last data filed at 17 1058   Gross per 24 hour   Intake              420 ml   Output              300 ml   Net              120 ml       Physical Exam   Eyes: Conjunctivae are normal.   Cardiovascular: Normal rate and regular rhythm.    No murmur heard.  Pulmonary/Chest: No respiratory distress. She has decreased breath sounds. She has wheezes (mild to moderate). She has no rales.   Abdominal: Soft. Bowel sounds are normal. She exhibits no mass. There is tenderness (lower quadrants).   Musculoskeletal: She exhibits no edema.   Neurological: She is alert. No sensory deficit.   Skin: Skin is warm. No rash noted.     Results Review:    I have reviewed the laboratory and imaging data since the last note by [facility] physician.  My annotations are noted in assessment and plan.    Medication Review:  I have reviewed the current MAR.  My annotations are noted in assessment and plan.      sodium chloride 30 mL/hr Last Rate: 30 mL/hr (17 1059)     Plan   PCCM Problems  COPD exacerbation  Underlying severe COPD  Chronic hypoxic respiratory failure, on nasal cannula  Chronic hypercapnic respiratory failure  Lung nodules with demonstrated stability on previous CT  Relevant Medical Diagnoses  GI bleed  Abdominal pain  Previous valve replacement  Chronic atrial fibrillation    Plan  of Treatment  I will repeat one dose of IV Solu-Medrol today is still with wheezing.  Try to avoid scheduled IV steroids.  She remains on nebs.    Continued complaints of abdominal pain especially in the lower quadrants.  GI is following and will manage.    Her oxygen levels are adequate.  Needs consideration of Trilogy as an outpatient.  She will follow-up with Dr. Melo who is her regular pulmonologist.    Guanako Crenshaw MD  08/20/17  2:12 PM    Part of this note may be an electronic transcription/translation of spoken language to printed text using the Dragon Dictation System.

## 2017-08-20 NOTE — PROGRESS NOTES
[facility]  Inpatient Progress Note    Reason for Follow Up:  Abdominal pain, GI bleeding    Subjective     Interval History:   She could not have CT scan yesterday due to retained barium.  MiraLAX and mag citrate given.  KUB pending today for repeat CT possible    Current Facility-Administered Medications:   •  acetaminophen (TYLENOL) tablet 650 mg, 650 mg, Oral, Q6H PRN, Tata Newby MD  •  albuterol (PROVENTIL) nebulizer solution 0.083% 2.5 mg/3mL, 2.5 mg, Nebulization, Q4H PRN, Nick Tovar MD, 2.5 mg at 08/19/17 0410  •  atorvastatin (LIPITOR) tablet 40 mg, 40 mg, Oral, Daily, Tata Newby MD, 40 mg at 08/20/17 0949  •  bisacodyl (DULCOLAX) suppository 10 mg, 10 mg, Rectal, Daily PRN, Tata Newby MD  •  budesonide-formoterol (SYMBICORT) 160-4.5 MCG/ACT inhaler 2 puff, 2 puff, Inhalation, BID - RT, Tata Newby MD, 2 puff at 08/20/17 0721  •  calcium carbonate (TUMS) chewable tablet 500 mg (200 mg elemental), 1 tablet, Oral, BID PRN, Tata Newby MD  •  carvedilol (COREG) tablet 12.5 mg, 12.5 mg, Oral, BID With Meals, Kirti Arteaga MD, 12.5 mg at 08/20/17 0949  •  cetirizine (zyrTEC) tablet 10 mg, 10 mg, Oral, Daily, Tata Newby MD, 10 mg at 08/20/17 0949  •  cyclobenzaprine (FLEXERIL) tablet 10 mg, 10 mg, Oral, Nightly, Tata Newby MD, 10 mg at 08/19/17 2019  •  ferrous sulfate tablet 325 mg, 325 mg, Oral, BID With Meals, Cleveland Devine MD, 325 mg at 08/20/17 0949  •  furosemide (LASIX) tablet 40 mg, 40 mg, Oral, BID, Earnest Gan MD, 40 mg at 08/20/17 0949  •  guaiFENesin (MUCINEX) 12 hr tablet 600 mg, 600 mg, Oral, Q12H, Nick Tovar MD, 600 mg at 08/20/17 0949  •  HYDROcodone-acetaminophen (NORCO) 5-325 MG per tablet 1 tablet, 1 tablet, Oral, Q4H PRN, Nick Tovar MD, 1 tablet at 08/20/17 1328  •  hydrocortisone (ANUSOL-HC) suppository 25 mg, 25 mg, Rectal, BID, Earnest Palacios V  MD, Stopped at 17 1811  •  hyoscyamine (LEVSIN) SL tablet 125 mcg, 125 mcg, Sublingual, Q4H PRN, BA Vaca, 125 mcg at 17 1107  •  ipratropium-albuterol (DUO-NEB) nebulizer solution 3 mL, 3 mL, Nebulization, Q4H - RT, Guanako Crenshaw MD, 3 mL at 17 1058  •  lisinopril (PRINIVIL,ZESTRIL) tablet 5 mg, 5 mg, Oral, Q24H, Kirti Arteaga MD, 5 mg at 17 0949  •  magnesium hydroxide (MILK OF MAGNESIA) suspension 2400 mg/10mL 10 mL, 10 mL, Oral, Daily PRN, Tata Newby MD  •  meclizine (ANTIVERT) tablet 25 mg, 25 mg, Oral, TID PRN, Tata Newby MD  •  Morphine sulfate (PF) injection 1 mg, 1 mg, Intravenous, Q4H PRN, Nick Tovar MD  •  multivitamin with minerals 1 tablet, 1 tablet, Oral, Daily, Tata Newby MD, 1 tablet at 17 0949  •  [] Morphine sulfate (PF) injection 2 mg, 2 mg, Intravenous, Q4H PRN, 2 mg at 17 1459 **AND** naloxone (NARCAN) injection 0.4 mg, 0.4 mg, Intravenous, Q5 Min PRN, Tata Newby MD  •  nystatin (MYCOSTATIN) 521814 UNIT/ML suspension 500,000 Units, 5 mL, Oral, 4x Daily, Fam Sanderson MD, 500,000 Units at 17 1328  •  ondansetron (ZOFRAN) injection 4 mg, 4 mg, Intravenous, Q6H PRN, Tata Newby MD, 4 mg at 17 2232  •  pantoprazole (PROTONIX) EC tablet 40 mg, 40 mg, Oral, QAM, Tata Newby MD, 40 mg at 17 0707  •  polyethylene glycol (MIRALAX) powder 17 g, 17 g, Oral, Daily, Fam Sanderson MD, 17 g at 17 1017  •  predniSONE (DELTASONE) tablet 40 mg, 40 mg, Oral, Daily With Breakfast, Guanako Crenshaw MD, 40 mg at 17 0949  •  rOPINIRole (REQUIP) tablet 2 mg, 2 mg, Oral, Nightly, Tata Newby MD, 2 mg at 17 2019  •  sertraline (ZOLOFT) tablet 100 mg, 100 mg, Oral, Daily, Tata Newby MD, 100 mg at 17 0949  •  sodium chloride (OCEAN) nasal spray 2 spray, 2 spray, Each Nare, PRN, Leonel Ruiz MD  •   sodium chloride 0.9 % flush 1-10 mL, 1-10 mL, Intravenous, PRN, Tata Newby MD  •  sodium chloride 0.9 % flush 10 mL, 10 mL, Intravenous, PRN, Amrit Reinoso MD  •  sodium chloride 0.9 % infusion, 30 mL/hr, Intravenous, Continuous PRN, Porsha Ruby MD, Last Rate: 30 mL/hr at 08/17/17 1059, 30 mL/hr at 08/17/17 1059  •  sucralfate (CARAFATE) 1 GM/10ML suspension 1 g, 1 g, Oral, Q6H, Porsha Ruby MD, 1 g at 08/20/17 1328  •  temazepam (RESTORIL) capsule 30 mg, 30 mg, Oral, Nightly PRN, Tata Newby MD, 30 mg at 08/19/17 2154  Review of Systems:    She has lower abdominal cramping with movement, all other systems reviewed and negative    Objective     Vital Signs  Temp:  [97.6 °F (36.4 °C)-98.3 °F (36.8 °C)] 97.8 °F (36.6 °C)  Heart Rate:  [52-92] 92  Resp:  [16-18] 18  BP: (125-169)/(61-93) 168/66  Body mass index is 26 kg/(m^2).    Intake/Output Summary (Last 24 hours) at 08/20/17 1525  Last data filed at 08/20/17 1058   Gross per 24 hour   Intake              420 ml   Output              300 ml   Net              120 ml     I/O this shift:  In: 360 [P.O.:360]  Out: -      Physical Exam:   General: patient awake, alert and cooperative   Eyes: Normal lids and lashes, no scleral icterus   Neck: supple, normal ROM   Skin: warm and dry, not jaundiced   Cardiovascular: regular rhythm and rate, no murmurs auscultated   Pulm: clear to auscultation bilaterally, regular and unlabored   Abdomen: soft, nontender, nondistended; normal bowel sounds   Rectal: deferred   Extremities: no rash or edema   Psychiatric: Normal mood and behavior; memory intact     Results Review:     I reviewed the patient's new clinical results.      Results from last 7 days  Lab Units 08/20/17  0431 08/19/17  0440 08/18/17  0334   WBC 10*3/mm3 9.09 7.63 5.99   HEMOGLOBIN g/dL 9.8* 8.8* 8.6*   HEMATOCRIT % 31.8* 29.0* 28.5*   PLATELETS 10*3/mm3 271 198 181       Results from last 7 days  Lab Units 08/20/17  0431  08/19/17  0440 08/18/17  0334 08/17/17  0743 08/16/17  1201   SODIUM mmol/L 139 140 140 142  --    POTASSIUM mmol/L 3.9 3.5 3.8 3.8  --    CHLORIDE mmol/L 96* 98 101 102  --    CO2 mmol/L 25.0 26.9 25.3 28.0  --    BUN mg/dL 12 16 17 23  --    CREATININE mg/dL 0.86 0.90 0.87 1.00  --    CALCIUM mg/dL 9.5 9.3 9.0 8.7  --    BILIRUBIN mg/dL  --  0.7  --  0.7 0.7   ALK PHOS U/L  --  99  --  98 79   ALT (SGPT) U/L  --  46*  --  44* 25   AST (SGOT) U/L  --  39*  --  61* 32   GLUCOSE mg/dL 145* 111* 105* 107*  --        Results from last 7 days  Lab Units 08/20/17  0431 08/19/17  0440 08/18/17  0334   INR  2.08* 2.22* 2.11*       Lab Results  Lab Value Date/Time   LIPASE 46 08/13/2017 0449       Radiology:  XR Chest PA & Lateral   Final Result      FL Upper GI With Air Contrast & SBFT   Final Result      XR Chest PA & Lateral   Final Result      CT Abdomen Pelvis Without Contrast   Final Result   1. No evidence for acute intra-abdominal process.   2. Basilar pulmonary emphysema.   3. Hiatal hernia. Atherosclerotic disease. Previous cholecystectomy and   hysterectomy.       This report was finalized on 8/9/2017 3:26 PM by Dr. Magdi Greer MD.          CT Abdomen Pelvis With Contrast    (Results Pending)   XR Abdomen KUB    (Results Pending)       Assessment/Plan     Patient Active Problem List   Diagnosis   • PAF (paroxysmal atrial fibrillation)   • Hypertension   • Hyperlipidemia   • COPD (chronic obstructive pulmonary disease)   • Pain medication agreement   • Hiatal hernia   • Primary osteoarthritis involving multiple joints   • Pulmonary hypertension   • S/P TVR (tricuspid valve repair)   • Long term current use of anticoagulant   • Pulmonary nodule   • Hemoptysis   • RLS (restless legs syndrome)   • Chronic low back pain   • Dysplastic polyp of colon   • Lower GI bleed   • History of mitral valve replacement with tissue graft   • Precordial pain   • Chronic respiratory failure with hypoxia   • Oral candidiasis    • VAN (acute kidney injury)   • Anemia     Impression  #1 Lower GI bleeding: resolved. Hgb stable in mid 8 range. Onset in the postprocedural setting s/p colonoscopy with bleeding internal hemorrhoids. Congested mucosa in transverse colon and clips placed on 8/4/17  #2 anticoagulant therapy: Coumadin stopped 8/15 per cards due to continued drop in Hgb, OK with GI for asa with plans to remain of AC  #3 Abdominal discomfort- SBFT with no obstructive process, evidence of esophagitis and mild stenosis. EGD 8/17/17 with gastritis.  Still with c/o abdominal discomfort, not related to po intake but appears unchanged over last several days.  + BM's  #4 ANNA- oral iron therapy initiated      Plan-  - continue PPI and carafate  - OK to start asa, per cards note AC to be held  - repeat CT scan given ongoing abd pain since d/c, awaiting results, awaiting barium to clear     I discussed the patients findings and my recommendations with patient and nursing staff.      I discussed the patients findings and my recommendations with patient, family and nursing staff.    Rick Mark MD

## 2017-08-21 ENCOUNTER — APPOINTMENT (OUTPATIENT)
Dept: MAMMOGRAPHY | Facility: HOSPITAL | Age: 62
End: 2017-08-21

## 2017-08-21 ENCOUNTER — APPOINTMENT (OUTPATIENT)
Dept: GENERAL RADIOLOGY | Facility: HOSPITAL | Age: 62
End: 2017-08-21
Attending: INTERNAL MEDICINE

## 2017-08-21 LAB
ANION GAP SERPL CALCULATED.3IONS-SCNC: 12.8 MMOL/L
BASOPHILS # BLD AUTO: 0.01 10*3/MM3 (ref 0–0.2)
BASOPHILS NFR BLD AUTO: 0.1 % (ref 0–1.5)
BUN BLD-MCNC: 12 MG/DL (ref 8–23)
BUN/CREAT SERPL: 13.6 (ref 7–25)
CALCIUM SPEC-SCNC: 9 MG/DL (ref 8.6–10.5)
CHLORIDE SERPL-SCNC: 98 MMOL/L (ref 98–107)
CO2 SERPL-SCNC: 32.2 MMOL/L (ref 22–29)
CREAT BLD-MCNC: 0.88 MG/DL (ref 0.57–1)
DEPRECATED RDW RBC AUTO: 47.9 FL (ref 37–54)
EOSINOPHIL # BLD AUTO: 0.03 10*3/MM3 (ref 0–0.7)
EOSINOPHIL NFR BLD AUTO: 0.3 % (ref 0.3–6.2)
ERYTHROCYTE [DISTWIDTH] IN BLOOD BY AUTOMATED COUNT: 14.6 % (ref 11.7–13)
GFR SERPL CREATININE-BSD FRML MDRD: 65 ML/MIN/1.73
GLUCOSE BLD-MCNC: 139 MG/DL (ref 65–99)
HCT VFR BLD AUTO: 29.6 % (ref 35.6–45.5)
HGB BLD-MCNC: 9 G/DL (ref 11.9–15.5)
IMM GRANULOCYTES # BLD: 0.03 10*3/MM3 (ref 0–0.03)
IMM GRANULOCYTES NFR BLD: 0.3 % (ref 0–0.5)
INR PPP: 1.84 (ref 0.9–1.1)
LYMPHOCYTES # BLD AUTO: 1.33 10*3/MM3 (ref 0.9–4.8)
LYMPHOCYTES NFR BLD AUTO: 12.9 % (ref 19.6–45.3)
MCH RBC QN AUTO: 27.6 PG (ref 26.9–32)
MCHC RBC AUTO-ENTMCNC: 30.4 G/DL (ref 32.4–36.3)
MCV RBC AUTO: 90.8 FL (ref 80.5–98.2)
MONOCYTES # BLD AUTO: 0.43 10*3/MM3 (ref 0.2–1.2)
MONOCYTES NFR BLD AUTO: 4.2 % (ref 5–12)
NEUTROPHILS # BLD AUTO: 8.45 10*3/MM3 (ref 1.9–8.1)
NEUTROPHILS NFR BLD AUTO: 82.2 % (ref 42.7–76)
PLATELET # BLD AUTO: 276 10*3/MM3 (ref 140–500)
PMV BLD AUTO: 10.7 FL (ref 6–12)
POTASSIUM BLD-SCNC: 3.2 MMOL/L (ref 3.5–5.2)
PROTHROMBIN TIME: 20.6 SECONDS (ref 11.7–14.2)
RBC # BLD AUTO: 3.26 10*6/MM3 (ref 3.9–5.2)
SODIUM BLD-SCNC: 143 MMOL/L (ref 136–145)
WBC NRBC COR # BLD: 10.28 10*3/MM3 (ref 4.5–10.7)

## 2017-08-21 PROCEDURE — 93010 ELECTROCARDIOGRAM REPORT: CPT | Performed by: INTERNAL MEDICINE

## 2017-08-21 PROCEDURE — 97110 THERAPEUTIC EXERCISES: CPT

## 2017-08-21 PROCEDURE — 99233 SBSQ HOSP IP/OBS HIGH 50: CPT | Performed by: INTERNAL MEDICINE

## 2017-08-21 PROCEDURE — 25010000002 MORPHINE SULFATE (PF) 2 MG/ML SOLUTION: Performed by: INTERNAL MEDICINE

## 2017-08-21 PROCEDURE — 85610 PROTHROMBIN TIME: CPT | Performed by: INTERNAL MEDICINE

## 2017-08-21 PROCEDURE — 93005 ELECTROCARDIOGRAM TRACING: CPT | Performed by: INTERNAL MEDICINE

## 2017-08-21 PROCEDURE — 87070 CULTURE OTHR SPECIMN AEROBIC: CPT | Performed by: INTERNAL MEDICINE

## 2017-08-21 PROCEDURE — 74000 HC ABDOMEN KUB: CPT

## 2017-08-21 PROCEDURE — 63710000001 PREDNISONE PER 1 MG: Performed by: INTERNAL MEDICINE

## 2017-08-21 PROCEDURE — 94799 UNLISTED PULMONARY SVC/PX: CPT

## 2017-08-21 PROCEDURE — 99232 SBSQ HOSP IP/OBS MODERATE 35: CPT | Performed by: INTERNAL MEDICINE

## 2017-08-21 PROCEDURE — 85025 COMPLETE CBC W/AUTO DIFF WBC: CPT | Performed by: INTERNAL MEDICINE

## 2017-08-21 PROCEDURE — 25010000002 METHYLPREDNISOLONE PER 40 MG: Performed by: INTERNAL MEDICINE

## 2017-08-21 PROCEDURE — 71020 HC CHEST PA AND LATERAL: CPT

## 2017-08-21 PROCEDURE — 80048 BASIC METABOLIC PNL TOTAL CA: CPT | Performed by: INTERNAL MEDICINE

## 2017-08-21 PROCEDURE — 87205 SMEAR GRAM STAIN: CPT | Performed by: INTERNAL MEDICINE

## 2017-08-21 RX ORDER — POLYETHYLENE GLYCOL 3350 17 G/17G
17 POWDER, FOR SOLUTION ORAL 2 TIMES DAILY
Status: DISCONTINUED | OUTPATIENT
Start: 2017-08-21 | End: 2017-08-25 | Stop reason: HOSPADM

## 2017-08-21 RX ORDER — MAGNESIUM CARB/ALUMINUM HYDROX 105-160MG
296 TABLET,CHEWABLE ORAL ONCE
Status: COMPLETED | OUTPATIENT
Start: 2017-08-21 | End: 2017-08-21

## 2017-08-21 RX ORDER — ASPIRIN 81 MG/1
81 TABLET ORAL DAILY
Status: DISCONTINUED | OUTPATIENT
Start: 2017-08-21 | End: 2017-08-25 | Stop reason: HOSPADM

## 2017-08-21 RX ORDER — POTASSIUM CHLORIDE 750 MG/1
40 CAPSULE, EXTENDED RELEASE ORAL AS NEEDED
Status: DISCONTINUED | OUTPATIENT
Start: 2017-08-21 | End: 2017-08-25

## 2017-08-21 RX ORDER — POTASSIUM CHLORIDE 1.5 G/1.77G
40 POWDER, FOR SOLUTION ORAL AS NEEDED
Status: DISCONTINUED | OUTPATIENT
Start: 2017-08-21 | End: 2017-08-25

## 2017-08-21 RX ORDER — POTASSIUM CHLORIDE 7.45 MG/ML
10 INJECTION INTRAVENOUS
Status: DISCONTINUED | OUTPATIENT
Start: 2017-08-21 | End: 2017-08-25

## 2017-08-21 RX ORDER — METHYLPREDNISOLONE SODIUM SUCCINATE 40 MG/ML
40 INJECTION, POWDER, LYOPHILIZED, FOR SOLUTION INTRAMUSCULAR; INTRAVENOUS EVERY 12 HOURS
Status: COMPLETED | OUTPATIENT
Start: 2017-08-21 | End: 2017-08-23

## 2017-08-21 RX ORDER — METHYLPREDNISOLONE SODIUM SUCCINATE 40 MG/ML
40 INJECTION, POWDER, LYOPHILIZED, FOR SOLUTION INTRAMUSCULAR; INTRAVENOUS EVERY 12 HOURS
Status: DISCONTINUED | OUTPATIENT
Start: 2017-08-21 | End: 2017-08-21

## 2017-08-21 RX ADMIN — SUCRALFATE 1 G: 1 SUSPENSION ORAL at 23:30

## 2017-08-21 RX ADMIN — IPRATROPIUM BROMIDE AND ALBUTEROL SULFATE 3 ML: .5; 3 SOLUTION RESPIRATORY (INHALATION) at 23:31

## 2017-08-21 RX ADMIN — MULTIPLE VITAMINS W/ MINERALS TAB 1 TABLET: TAB at 08:51

## 2017-08-21 RX ADMIN — SUCRALFATE 1 G: 1 SUSPENSION ORAL at 18:52

## 2017-08-21 RX ADMIN — IPRATROPIUM BROMIDE AND ALBUTEROL SULFATE 3 ML: .5; 3 SOLUTION RESPIRATORY (INHALATION) at 15:35

## 2017-08-21 RX ADMIN — IPRATROPIUM BROMIDE AND ALBUTEROL SULFATE 3 ML: .5; 3 SOLUTION RESPIRATORY (INHALATION) at 04:50

## 2017-08-21 RX ADMIN — ROPINIROLE 2 MG: 2 TABLET, FILM COATED ORAL at 22:07

## 2017-08-21 RX ADMIN — NYSTATIN 500000 UNITS: 100000 SUSPENSION ORAL at 22:07

## 2017-08-21 RX ADMIN — NYSTATIN 500000 UNITS: 100000 SUSPENSION ORAL at 11:32

## 2017-08-21 RX ADMIN — NYSTATIN 500000 UNITS: 100000 SUSPENSION ORAL at 08:51

## 2017-08-21 RX ADMIN — FUROSEMIDE 40 MG: 40 TABLET ORAL at 18:51

## 2017-08-21 RX ADMIN — NYSTATIN 500000 UNITS: 100000 SUSPENSION ORAL at 18:52

## 2017-08-21 RX ADMIN — BUDESONIDE AND FORMOTEROL FUMARATE DIHYDRATE 2 PUFF: 160; 4.5 AEROSOL RESPIRATORY (INHALATION) at 07:36

## 2017-08-21 RX ADMIN — MORPHINE SULFATE 1 MG: 2 INJECTION, SOLUTION INTRAMUSCULAR; INTRAVENOUS at 22:12

## 2017-08-21 RX ADMIN — POTASSIUM CHLORIDE 40 MEQ: 750 CAPSULE, EXTENDED RELEASE ORAL at 23:29

## 2017-08-21 RX ADMIN — FUROSEMIDE 40 MG: 40 TABLET ORAL at 08:51

## 2017-08-21 RX ADMIN — MORPHINE SULFATE 1 MG: 2 INJECTION, SOLUTION INTRAMUSCULAR; INTRAVENOUS at 10:12

## 2017-08-21 RX ADMIN — HYDROCODONE BITARTRATE AND ACETAMINOPHEN 1 TABLET: 5; 325 TABLET ORAL at 18:51

## 2017-08-21 RX ADMIN — TEMAZEPAM 30 MG: 15 CAPSULE ORAL at 23:29

## 2017-08-21 RX ADMIN — MORPHINE SULFATE 1 MG: 2 INJECTION, SOLUTION INTRAMUSCULAR; INTRAVENOUS at 06:11

## 2017-08-21 RX ADMIN — POTASSIUM CHLORIDE 40 MEQ: 750 CAPSULE, EXTENDED RELEASE ORAL at 19:18

## 2017-08-21 RX ADMIN — HYDROCODONE BITARTRATE AND ACETAMINOPHEN 1 TABLET: 5; 325 TABLET ORAL at 08:59

## 2017-08-21 RX ADMIN — LISINOPRIL 5 MG: 5 TABLET ORAL at 08:51

## 2017-08-21 RX ADMIN — FERROUS SULFATE TAB 325 MG (65 MG ELEMENTAL FE) 325 MG: 325 (65 FE) TAB at 18:51

## 2017-08-21 RX ADMIN — IPRATROPIUM BROMIDE AND ALBUTEROL SULFATE 3 ML: .5; 3 SOLUTION RESPIRATORY (INHALATION) at 19:28

## 2017-08-21 RX ADMIN — MORPHINE SULFATE 1 MG: 2 INJECTION, SOLUTION INTRAMUSCULAR; INTRAVENOUS at 01:18

## 2017-08-21 RX ADMIN — IPRATROPIUM BROMIDE AND ALBUTEROL SULFATE 3 ML: .5; 3 SOLUTION RESPIRATORY (INHALATION) at 07:35

## 2017-08-21 RX ADMIN — GUAIFENESIN 600 MG: 600 TABLET, EXTENDED RELEASE ORAL at 08:51

## 2017-08-21 RX ADMIN — ATORVASTATIN CALCIUM 40 MG: 20 TABLET, FILM COATED ORAL at 08:51

## 2017-08-21 RX ADMIN — SUCRALFATE 1 G: 1 SUSPENSION ORAL at 11:32

## 2017-08-21 RX ADMIN — CARVEDILOL 12.5 MG: 12.5 TABLET, FILM COATED ORAL at 08:51

## 2017-08-21 RX ADMIN — BUDESONIDE AND FORMOTEROL FUMARATE DIHYDRATE 2 PUFF: 160; 4.5 AEROSOL RESPIRATORY (INHALATION) at 19:38

## 2017-08-21 RX ADMIN — SUCRALFATE 1 G: 1 SUSPENSION ORAL at 06:12

## 2017-08-21 RX ADMIN — CYCLOBENZAPRINE HYDROCHLORIDE 10 MG: 10 TABLET, FILM COATED ORAL at 22:07

## 2017-08-21 RX ADMIN — CARVEDILOL 12.5 MG: 12.5 TABLET, FILM COATED ORAL at 18:51

## 2017-08-21 RX ADMIN — Medication 296 ML: at 15:33

## 2017-08-21 RX ADMIN — ASPIRIN 81 MG: 81 TABLET ORAL at 10:06

## 2017-08-21 RX ADMIN — SERTRALINE 100 MG: 100 TABLET, FILM COATED ORAL at 08:51

## 2017-08-21 RX ADMIN — CETIRIZINE HYDROCHLORIDE 10 MG: 10 TABLET, FILM COATED ORAL at 08:51

## 2017-08-21 RX ADMIN — METHYLPREDNISOLONE SODIUM SUCCINATE 40 MG: 40 INJECTION, POWDER, FOR SOLUTION INTRAMUSCULAR; INTRAVENOUS at 22:07

## 2017-08-21 RX ADMIN — HYDROCODONE BITARTRATE AND ACETAMINOPHEN 1 TABLET: 5; 325 TABLET ORAL at 04:39

## 2017-08-21 RX ADMIN — MORPHINE SULFATE 1 MG: 2 INJECTION, SOLUTION INTRAMUSCULAR; INTRAVENOUS at 14:36

## 2017-08-21 RX ADMIN — PREDNISONE 40 MG: 20 TABLET ORAL at 08:51

## 2017-08-21 RX ADMIN — FERROUS SULFATE TAB 325 MG (65 MG ELEMENTAL FE) 325 MG: 325 (65 FE) TAB at 08:51

## 2017-08-21 RX ADMIN — IPRATROPIUM BROMIDE AND ALBUTEROL SULFATE 3 ML: .5; 3 SOLUTION RESPIRATORY (INHALATION) at 11:35

## 2017-08-21 RX ADMIN — GUAIFENESIN 600 MG: 600 TABLET, EXTENDED RELEASE ORAL at 22:07

## 2017-08-21 RX ADMIN — PANTOPRAZOLE SODIUM 40 MG: 40 TABLET, DELAYED RELEASE ORAL at 06:12

## 2017-08-21 NOTE — PLAN OF CARE
Problem: Patient Care Overview (Adult)  Goal: Plan of Care Review  Outcome: Ongoing (interventions implemented as appropriate)    08/20/17 0430 08/20/17 2008 08/21/17 0225   Coping/Psychosocial Response Interventions   Plan Of Care Reviewed With --  patient --    Patient Care Overview   Progress improving --  --    Outcome Evaluation   Outcome Summary/Follow up Plan --  --  VSS. IV solumedrol given today and starting prednisone in am. Lungs still wheezing and becomes very SOA with any exertion. Abdominal pain continues, pt taking Lortab and IV morphine. CT abd and pelvis scheduled for am, if KUB shows clear of barrium. Mag sitrate given earlier to help clear the barrium. Will continue to monitor.        Goal: Adult Individualization and Mutuality  Outcome: Ongoing (interventions implemented as appropriate)  Goal: Discharge Needs Assessment  Outcome: Ongoing (interventions implemented as appropriate)    Problem: Fall Risk (Adult)  Goal: Identify Related Risk Factors and Signs and Symptoms  Outcome: Ongoing (interventions implemented as appropriate)  Goal: Absence of Falls  Outcome: Ongoing (interventions implemented as appropriate)    Problem: Pain, Acute (Adult)  Goal: Identify Related Risk Factors and Signs and Symptoms  Outcome: Ongoing (interventions implemented as appropriate)  Goal: Acceptable Pain Control/Comfort Level  Outcome: Ongoing (interventions implemented as appropriate)    Problem: GI Endoscopy (Adult)  Goal: Signs and Symptoms of Listed Potential Problems Will be Absent or Manageable (GI Endoscopy)  Outcome: Ongoing (interventions implemented as appropriate)    Problem: Respiratory Insufficiency (Adult)  Goal: Identify Related Risk Factors and Signs and Symptoms  Outcome: Ongoing (interventions implemented as appropriate)  Goal: Acid/Base Balance  Outcome: Ongoing (interventions implemented as appropriate)  Goal: Effective Ventilation  Outcome: Ongoing (interventions implemented as appropriate)

## 2017-08-21 NOTE — PROGRESS NOTES
Name: Paz Browne ADMIT: 2017   : 1955  PCP: Javan Martinez MD    MRN: [id number] LOS: 12 days   AGE/SEX: 62 y.o. female  ROOM: [facility]/   Subjective   Subjective  No CP NVD. Reports BM yesterday. Abd pain, right sided, a bit improved today. Dyspnea improving, no cough.    Objective   Vital Signs  Temp:  [97.6 °F (36.4 °C)-98.3 °F (36.8 °C)] 97.6 °F (36.4 °C)  Heart Rate:  [65-92] 66  Resp:  [16-18] 18  BP: (136-168)/(63-84) 137/65  SpO2:  [92 %-98 %] 92 %  on  Flow (L/min):  [3] 3;   O2 Device: nasal cannula with humidification  Body mass index is 26 kg/(m^2).    Physical Exam   Constitutional: She appears well-developed and well-nourished. No distress.   HENT:   Head: Normocephalic and atraumatic.   Eyes: EOM are normal. Pupils are equal, round, and reactive to light.   Neck: Normal range of motion. Neck supple.   Cardiovascular: Normal rate, regular rhythm and intact distal pulses.    Murmur (2/6 systolic) heard.  Pulmonary/Chest: Effort normal. She has decreased breath sounds. She has wheezes (mild end expiratory, L>R). She has no rales.   Abdominal: Soft. There is no tenderness. There is no rebound and no guarding.   Musculoskeletal: Normal range of motion. She exhibits no edema.   Neurological: She is alert. No cranial nerve deficit.   Skin: Skin is warm and dry. She is not diaphoretic.   Psychiatric: She has a normal mood and affect. Her behavior is normal.   Nursing note and vitals reviewed.      Results Review:       I reviewed the patient's new clinical results. Reviewed imaging, agree with interpretation. Reviewed telemetry, artifact present but regular rhythm otherwise.    Results from last 7 days  Lab Units 17  0451 17  0431 17  0440 17  0334 17  0743 17  0349 08/15/17  0451   WBC 10*3/mm3 10.28 9.09 7.63 5.99 5.55 7.41 7.40   HEMOGLOBIN g/dL 9.0* 9.8* 8.8* 8.6* 8.6* 8.8* 8.0*   PLATELETS 10*3/mm3 276 271 198 181 165 176 173     Results from last  7 days  Lab Units 08/21/17  0451 08/20/17  0431 08/19/17  0440 08/18/17  0334 08/17/17  0743 08/16/17  0349 08/15/17  0451   SODIUM mmol/L 143 139 140 140 142 133* 135*   POTASSIUM mmol/L 3.2* 3.9 3.5 3.8 3.8 4.1 4.0   CHLORIDE mmol/L 98 96* 98 101 102 95* 92*   CO2 mmol/L 32.2* 25.0 26.9 25.3 28.0 26.1 27.6   BUN mg/dL 12 12 16 17 23 41* 38*   CREATININE mg/dL 0.88 0.86 0.90 0.87 1.00 1.70* 2.15*   GLUCOSE mg/dL 139* 145* 111* 105* 107* 109* 110*   Estimated Creatinine Clearance: 68.4 mL/min (by C-G formula based on Cr of 0.88).  Results from last 7 days  Lab Units 08/21/17  0451 08/20/17  0431 08/19/17  0440 08/18/17  0334 08/17/17  0743 08/16/17  1201 08/16/17  0349 08/15/17  0451   CALCIUM mg/dL 9.0 9.5 9.3 9.0 8.7  --  8.5* 8.6   ALBUMIN g/dL  --   --  4.10  --  4.00 3.80  --   --          aspirin 81 mg Oral Daily   atorvastatin 40 mg Oral Daily   budesonide-formoterol 2 puff Inhalation BID - RT   carvedilol 12.5 mg Oral BID With Meals   cetirizine 10 mg Oral Daily   cyclobenzaprine 10 mg Oral Nightly   ferrous sulfate 325 mg Oral BID With Meals   furosemide 40 mg Oral BID   guaiFENesin 600 mg Oral Q12H   hydrocortisone 25 mg Rectal BID   ipratropium-albuterol 3 mL Nebulization Q4H - RT   lisinopril 5 mg Oral Q24H   methylPREDNISolone sodium succinate 40 mg Intravenous Q12H   multivitamin with minerals 1 tablet Oral Daily   nystatin 5 mL Oral 4x Daily   pantoprazole 40 mg Oral QAM   polyethylene glycol 17 g Oral Daily   rOPINIRole 2 mg Oral Nightly   sertraline 100 mg Oral Daily   sucralfate 1 g Oral Q6H       sodium chloride 30 mL/hr Last Rate: 30 mL/hr (08/17/17 1059)   Diet Regular; Cardiac      Assessment/Plan   Assessment:     Active Hospital Problems (** Indicates Principal Problem)    Diagnosis Date Noted   • **Anemia [D64.9] 08/09/2017   • VAN (acute kidney injury) [N17.9] 08/15/2017   • Oral candidiasis [B37.0] 08/14/2017   • Chronic respiratory failure with hypoxia [J96.11] 08/13/2017   • History of  mitral valve replacement with tissue graft [Z95.4] 08/11/2017   • Precordial pain [R07.2] 08/11/2017   • Lower GI bleed [K92.2] 08/09/2017   • Long term current use of anticoagulant [Z79.01] 10/13/2016   • Pulmonary hypertension [I27.2]    • Hypertension [I10]    • COPD (chronic obstructive pulmonary disease) [J44.9]    • PAF (paroxysmal atrial fibrillation) [I48.0] 01/25/2016      Resolved Hospital Problems    Diagnosis Date Noted Date Resolved   No resolved problems to display.       Plan:   - Anemia: GI losses. CScope with clipped bleeding hemorrhoid. SBFT with stenosis and esophagitis. EGD with gastritis and she did not require esophageal dilation. Abd pain still present, KUB with retained contrast sigmoid/rectosigmoid. Repeat KUB +/- CT pending. Hgb stable. GI following.  - PAF: Rate control. AC held. Plan for discharge with ASA and off coumadin. INR now decreasing, can stop monitoring. Cardiology following.   - COPD AE/Chronic Hypoxic Resp Failure/Pulm HTN: On home 3L. Steroid PO. Improving. Pulmonology following.  - VAN: Resolved.     Disposition  HH/possibly tomorrow    Nick Tovar MD  [facility]  08/21/17  10:42 AM

## 2017-08-21 NOTE — PROGRESS NOTES
[facility]  Inpatient Progress Note    Reason for Follow Up:  Abdominal pain, GI bleeding    Subjective     Interval History:   She could not have CT scan yesterday due to retained barium.  MiraLAX and mag citrate given with + BMs. Waiting to go to [facility] this am for repeat CT possible after.    Abdominal pain persists in LLQ and RUQ.     Current Facility-Administered Medications:   •  acetaminophen (TYLENOL) tablet 650 mg, 650 mg, Oral, Q6H PRN, Tata Newby MD  •  albuterol (PROVENTIL) nebulizer solution 0.083% 2.5 mg/3mL, 2.5 mg, Nebulization, Q4H PRN, Nick Tovar MD, 2.5 mg at 08/19/17 0410  •  aspirin EC tablet 81 mg, 81 mg, Oral, Daily, Earnest Gan MD  •  atorvastatin (LIPITOR) tablet 40 mg, 40 mg, Oral, Daily, Tata Newby MD, 40 mg at 08/21/17 0851  •  bisacodyl (DULCOLAX) suppository 10 mg, 10 mg, Rectal, Daily PRN, Tata Newby MD  •  budesonide-formoterol (SYMBICORT) 160-4.5 MCG/ACT inhaler 2 puff, 2 puff, Inhalation, BID - RT, Tata Newby MD, 2 puff at 08/21/17 0736  •  calcium carbonate (TUMS) chewable tablet 500 mg (200 mg elemental), 1 tablet, Oral, BID PRN, Tata Newby MD  •  carvedilol (COREG) tablet 12.5 mg, 12.5 mg, Oral, BID With Meals, Kirti Arteaga MD, 12.5 mg at 08/21/17 0851  •  cetirizine (zyrTEC) tablet 10 mg, 10 mg, Oral, Daily, Tata Newby MD, 10 mg at 08/21/17 0851  •  cyclobenzaprine (FLEXERIL) tablet 10 mg, 10 mg, Oral, Nightly, Tata Newby MD, 10 mg at 08/20/17 2009  •  ferrous sulfate tablet 325 mg, 325 mg, Oral, BID With Meals, Cleveland Devine MD, 325 mg at 08/21/17 0851  •  furosemide (LASIX) tablet 40 mg, 40 mg, Oral, BID, Earnest Gan MD, 40 mg at 08/21/17 0851  •  guaiFENesin (MUCINEX) 12 hr tablet 600 mg, 600 mg, Oral, Q12H, Nick Tovar MD, 600 mg at 08/21/17 0851  •  HYDROcodone-acetaminophen (NORCO) 5-325 MG per tablet 1 tablet, 1 tablet, Oral, Q4H PRN,  Nick Tovar MD, 1 tablet at 17 0859  •  hydrocortisone (ANUSOL-HC) suppository 25 mg, 25 mg, Rectal, BID, Earnest PALOMO MD, Stopped at 17 1811  •  hyoscyamine (LEVSIN) SL tablet 125 mcg, 125 mcg, Sublingual, Q4H PRN, Montserrat Newby, APRN, 125 mcg at 17 1107  •  ipratropium-albuterol (DUO-NEB) nebulizer solution 3 mL, 3 mL, Nebulization, Q4H - RT, Guanako Crenshaw MD, 3 mL at 17 0735  •  lisinopril (PRINIVIL,ZESTRIL) tablet 5 mg, 5 mg, Oral, Q24H, Kirti Arteaga MD, 5 mg at 17 0851  •  magnesium hydroxide (MILK OF MAGNESIA) suspension 2400 mg/10mL 10 mL, 10 mL, Oral, Daily PRN, Tata Newby MD  •  meclizine (ANTIVERT) tablet 25 mg, 25 mg, Oral, TID PRN, Tata Newby MD  •  methylPREDNISolone sodium succinate (SOLU-Medrol) injection 40 mg, 40 mg, Intravenous, Q12H, Guanako Crenshaw MD  •  Morphine sulfate (PF) injection 1 mg, 1 mg, Intravenous, Q4H PRN, Nick Tovar MD, 1 mg at 17 0611  •  multivitamin with minerals 1 tablet, 1 tablet, Oral, Daily, Tata Newby MD, 1 tablet at 17 0851  •  [] Morphine sulfate (PF) injection 2 mg, 2 mg, Intravenous, Q4H PRN, 2 mg at 17 1459 **AND** naloxone (NARCAN) injection 0.4 mg, 0.4 mg, Intravenous, Q5 Min PRN, Tata Newby MD  •  nystatin (MYCOSTATIN) 732298 UNIT/ML suspension 500,000 Units, 5 mL, Oral, 4x Daily, Fam Sanderson MD, 500,000 Units at 17 0851  •  ondansetron (ZOFRAN) injection 4 mg, 4 mg, Intravenous, Q6H PRN, Tata Newby MD, 4 mg at 172  •  pantoprazole (PROTONIX) EC tablet 40 mg, 40 mg, Oral, QAM, Tata Newby MD, 40 mg at 17 0612  •  polyethylene glycol (MIRALAX) powder 17 g, 17 g, Oral, Daily, Fam Sanderson MD, 17 g at 17 1017  •  rOPINIRole (REQUIP) tablet 2 mg, 2 mg, Oral, Nightly, Tata Newby MD, 2 mg at 17  •  sertraline (ZOLOFT) tablet 100 mg, 100 mg, Oral,  Daily, Tata Newby MD, 100 mg at 08/21/17 0851  •  sodium chloride (OCEAN) nasal spray 2 spray, 2 spray, Each Nare, PRN, Leonel Ruiz MD  •  sodium chloride 0.9 % flush 1-10 mL, 1-10 mL, Intravenous, PRN, Tata Newby MD  •  sodium chloride 0.9 % flush 10 mL, 10 mL, Intravenous, PRN, Amrit Reinoso MD  •  sodium chloride 0.9 % infusion, 30 mL/hr, Intravenous, Continuous PRN, Porsha Ruby MD, Last Rate: 30 mL/hr at 08/17/17 1059, 30 mL/hr at 08/17/17 1059  •  sucralfate (CARAFATE) 1 GM/10ML suspension 1 g, 1 g, Oral, Q6H, Porsha Ruby MD, 1 g at 08/21/17 0612  •  temazepam (RESTORIL) capsule 30 mg, 30 mg, Oral, Nightly PRN, Tata Newby MD, 30 mg at 08/20/17 2334  Review of Systems:    She has lower abdominal cramping with movement, all other systems reviewed and negative    Objective     Vital Signs  Temp:  [97.6 °F (36.4 °C)-98.3 °F (36.8 °C)] 98.3 °F (36.8 °C)  Heart Rate:  [65-92] 66  Resp:  [16-18] 18  BP: (136-168)/(63-84) 150/63  Body mass index is 26 kg/(m^2).    Intake/Output Summary (Last 24 hours) at 08/21/17 0951  Last data filed at 08/20/17 1835   Gross per 24 hour   Intake              600 ml   Output                0 ml   Net              600 ml           Physical Exam:   General: patient awake, alert and cooperative   Eyes: Normal lids and lashes, no scleral icterus   Neck: supple, normal ROM   Skin: warm and dry, not jaundiced   Cardiovascular: regular rhythm and rate, no murmurs auscultated   Pulm: clear to auscultation bilaterally, regular and unlabored   Abdomen: soft, tender LLQ and periumbilical, nondistended; normal bowel sounds   Rectal: deferred   Extremities: no rash or edema   Psychiatric: Normal mood and behavior; memory intact     Results Review:     I reviewed the patient's new clinical results.      Results from last 7 days  Lab Units 08/21/17  0451 08/20/17  0431 08/19/17  0440   WBC 10*3/mm3 10.28 9.09 7.63   HEMOGLOBIN g/dL 9.0* 9.8*  8.8*   HEMATOCRIT % 29.6* 31.8* 29.0*   PLATELETS 10*3/mm3 276 271 198       Results from last 7 days  Lab Units 08/21/17  0451 08/20/17  0431 08/19/17  0440  08/17/17  0743 08/16/17  1201   SODIUM mmol/L 143 139 140  < > 142  --    POTASSIUM mmol/L 3.2* 3.9 3.5  < > 3.8  --    CHLORIDE mmol/L 98 96* 98  < > 102  --    CO2 mmol/L 32.2* 25.0 26.9  < > 28.0  --    BUN mg/dL 12 12 16  < > 23  --    CREATININE mg/dL 0.88 0.86 0.90  < > 1.00  --    CALCIUM mg/dL 9.0 9.5 9.3  < > 8.7  --    BILIRUBIN mg/dL  --   --  0.7  --  0.7 0.7   ALK PHOS U/L  --   --  99  --  98 79   ALT (SGPT) U/L  --   --  46*  --  44* 25   AST (SGOT) U/L  --   --  39*  --  61* 32   GLUCOSE mg/dL 139* 145* 111*  < > 107*  --    < > = values in this interval not displayed.    Results from last 7 days  Lab Units 08/21/17 0451 08/20/17 0431 08/19/17  0440   INR  1.84* 2.08* 2.22*       Lab Results  Lab Value Date/Time   LIPASE 46 08/13/2017 0449       Radiology:  XR Abdomen KUB   Final Result      XR Chest PA & Lateral   Final Result      FL Upper GI With Air Contrast & SBFT   Final Result      XR Chest PA & Lateral   Final Result      CT Abdomen Pelvis Without Contrast   Final Result   1. No evidence for acute intra-abdominal process.   2. Basilar pulmonary emphysema.   3. Hiatal hernia. Atherosclerotic disease. Previous cholecystectomy and   hysterectomy.       This report was finalized on 8/9/2017 3:26 PM by Dr. Magdi Greer MD.          CT Abdomen Pelvis With Contrast    (Results Pending)   XR Abdomen KUB    (Results Pending)   XR Chest 2 View    (Results Pending)       Assessment/Plan     Patient Active Problem List   Diagnosis   • PAF (paroxysmal atrial fibrillation)   • Hypertension   • Hyperlipidemia   • COPD (chronic obstructive pulmonary disease)   • Pain medication agreement   • Hiatal hernia   • Primary osteoarthritis involving multiple joints   • Pulmonary hypertension   • S/P TVR (tricuspid valve repair)   • Long term current  use of anticoagulant   • Pulmonary nodule   • Hemoptysis   • RLS (restless legs syndrome)   • Chronic low back pain   • Dysplastic polyp of colon   • Lower GI bleed   • History of mitral valve replacement with tissue graft   • Precordial pain   • Chronic respiratory failure with hypoxia   • Oral candidiasis   • VAN (acute kidney injury)   • Anemia       BA [place]    Impression:  #1 Lower GI bleeding: resolved. Hgb stable in mid 8 range. Onset in the postprocedural setting s/p colonoscopy with bleeding internal hemorrhoids. Congested mucosa in transverse colon and clips placed on 8/4/17  #2 anticoagulant therapy: Coumadin stopped 8/15 per cards due to continued drop in Hgb, OK with GI for asa with plans to remain of AC  #3 Abdominal discomfort- SBFT with no obstructive process, evidence of esophagitis and mild stenosis. EGD 8/17/17 with gastritis.  Still with c/o abdominal discomfort, not related to po intake but appears unchanged over last several days.  + BM's overnight  #4 ANNA- oral iron therapy initiated      Plan:  - continue PPI and carafate  - OK to start asa, per cards note AC to be held  - repeat CT scan given ongoing abd pain since d/c, awaiting results of KUB  this am to assess for clearance of barium then     hopefully CT to follow     I discussed the patients findings and my recommendations with patient and nursing staff.          Rick Morales M.D.  [facility]  [address] [place] [address] - [address] [place] [address]  Office: [phone]

## 2017-08-21 NOTE — PROGRESS NOTES
Continued Stay Note  [facility]     Patient Name: Paz Browne  MRN: [id number]  Today's Date: 8/21/2017    Admit Date: 8/9/2017          Discharge Plan       08/21/17 1217    Case Management/Social Work Plan    Plan Home on discharge with Jodi GARCIA    Additional Comments Order received to see patient for DME (walker) . Spoke with patient and confirmed that patient has a  walker. . Patient still plans to go home on discharge and anticipates no needs.. Patient has oxygen at home, it is supplied by [facility].  ......   ARMANDO Keita              Discharge Codes     None        Expected Discharge Date and Time     Expected Discharge Date Expected Discharge Time    Aug 18, 2017             ARMANDO Keita

## 2017-08-21 NOTE — PROGRESS NOTES
[facility]  Phone: [phone]  Guanako Crenshaw MD    Subjective:  LOS: 12    She continues to have some wheezing but is overall better.  She continues to have abdominal pain especially in the left lower quadrant and right upper quadrant.    Objective   Vital Signs past 24hrs  BP range: BP: (136-168)/(63-84) 150/63  Pulse range: Heart Rate:  [65-92] 66  Resp rate range: Resp:  [16-18] 18  Temp range: Temp (24hrs), Av.9 °F (36.6 °C), Min:97.6 °F (36.4 °C), Max:98.3 °F (36.8 °C)    O2 Device: nasal cannulaFlow (L/min):  [3] 3  Oxygen range:SpO2:  [92 %-98 %] 92 %   166 lb (75.3 kg); Body mass index is 26 kg/(m^2).    Intake/Output Summary (Last 24 hours) at 17 0945  Last data filed at 17 1835   Gross per 24 hour   Intake              600 ml   Output                0 ml   Net              600 ml       Physical Exam   Eyes: Conjunctivae are normal.   Cardiovascular: Normal rate and regular rhythm.    No murmur heard.  Pulmonary/Chest: No respiratory distress. She has decreased breath sounds. She has wheezes (mild to moderate). She has no rales.   Abdominal: Soft. Bowel sounds are normal. She exhibits no mass. There is tenderness (LLQ and RUQ).   Musculoskeletal: She exhibits no edema.   Neurological: She is alert. No sensory deficit.   Skin: Skin is warm. No rash noted.     Results Review:    I have reviewed the laboratory and imaging data since the last note by [facility] physician.  My annotations are noted in assessment and plan.    Medication Review:  I have reviewed the current MAR.  My annotations are noted in assessment and plan.      sodium chloride 30 mL/hr Last Rate: 30 mL/hr (17 1059)     Plan   PCCM Problems  COPD exacerbation  Underlying severe COPD  Chronic hypoxic respiratory failure, on nasal cannula  Chronic hypercapnic respiratory failure  Lung nodules with demonstrated stability on previous CT  Relevant Medical Diagnoses  GI bleed  Abdominal pain  Previous valve  replacement  Chronic atrial fibrillation    Plan of Treatment  Since she is still wheezing I will give her IV Solu-Medrol 40 mg every 12 hours.  This can be discontinued on discharge and switched to oral prednisone. Check cxR also.    Continued complaints of abdominal pain especially in the lower quadrants.  GI is following and will manage.    Her oxygen levels are adequate.  Needs consideration of Trilogy as an outpatient.  She will follow-up with Dr. Melo who is her regular pulmonologist.    Guanako Crenshaw MD  08/21/17  9:45 AM    Part of this note may be an electronic transcription/translation of spoken language to printed text using the Dragon Dictation System.

## 2017-08-21 NOTE — PROGRESS NOTES
"CC: MVR/PAF    Interval History:   She does feel much better but still weak    Vital Signs  Temp:  [97.6 °F (36.4 °C)-98.3 °F (36.8 °C)] 98.3 °F (36.8 °C)  Heart Rate:  [65-92] 66  Resp:  [16-18] 18  BP: (136-168)/(63-84) 150/63    Intake/Output Summary (Last 24 hours) at 08/21/17 0748  Last data filed at 08/20/17 1835   Gross per 24 hour   Intake              600 ml   Output                0 ml   Net              600 ml     Flowsheet Rows         First Filed Value    Admission Height  67\" (170.2 cm) Documented at 08/09/2017 1152    Admission Weight  166 lb (75.3 kg) Documented at 08/09/2017 1152          PHYSICAL EXAM:  General: No acute distress  Resp:NL Rate, unlabored, Diffuse coarse rales And scattered wheezing   CV:NL rate and rhythm, NL PMI, Nl S1 and S2, 2/6 systolic [place], no gallop, no rub, No JVD. Normal pedal pulses  ABD:Nl sounds, no masses or tenderness, nondistended, no quarding or rebound  Neuro: alert,cooperative and oriented  Extr: No edema or cyanosis, moves all extremities      Results Review:      Results from last 7 days  Lab Units 08/21/17  0451   SODIUM mmol/L 143   POTASSIUM mmol/L 3.2*   CHLORIDE mmol/L 98   CO2 mmol/L 32.2*   BUN mg/dL 12   CREATININE mg/dL 0.88   GLUCOSE mg/dL 139*   CALCIUM mg/dL 9.0           Results from last 7 days  Lab Units 08/21/17  0451   WBC 10*3/mm3 10.28   HEMOGLOBIN g/dL 9.0*   HEMATOCRIT % 29.6*   PLATELETS 10*3/mm3 276       Results from last 7 days  Lab Units 08/21/17  0451 08/20/17  0431 08/19/17  0440  08/17/17  0743   INR  1.84* 2.08* 2.22*  < > 1.81*   APTT seconds  --   --   --   --  48.8*   < > = values in this interval not displayed.              @LABRCNT(bnp)@  I reviewed the patient's new clinical results.  I personally viewed and interpreted the patient's EKG/Telemetry data        Medication Review:   Meds reviewed      sodium chloride 30 mL/hr Last Rate: 30 mL/hr (08/17/17 8632)       Assessment/Plan  1.  GI bleed.  Status post polypectomy. Also " with hemorrhoids.  Upper endoscopy showed gastritis no stricture.  Hemoglobin is stable today.  2.  Atrial fibrillation/flutter.  Status post atrial appendage closure and cryo-maze procedure no documented recurrence since her cardioversion in 2014.  We'll maintain her off warfarin for now INR 2.1.  Would like to start aspirin daily we'll check an ECG today to make sure she still in sinus  3.  History of severe mitral and tricuspid insufficiency status post mitral replacement with tissue valve and tricuspid valve repair.  4.  Marked pulmonary hypertension.  Not a transplant candidate follows with pulmonary.  5.  History of hypertension blood pressure adequately controlled.  6.  Renal insufficiency improved  8.  Hyperlipidemia.  9.  Volume overload.   now stable continue the same.        Earnest Gan MD  08/21/17  7:48 AM

## 2017-08-21 NOTE — PLAN OF CARE
Problem: Patient Care Overview (Adult)  Goal: Plan of Care Review  Outcome: Ongoing (interventions implemented as appropriate)    08/21/17 1042   Coping/Psychosocial Response Interventions   Plan Of Care Reviewed With patient   Outcome Evaluation   Outcome Summary/Follow up Plan Pt with con't progression as expected, no new concerns noted.

## 2017-08-22 ENCOUNTER — APPOINTMENT (OUTPATIENT)
Dept: GENERAL RADIOLOGY | Facility: HOSPITAL | Age: 62
End: 2017-08-22

## 2017-08-22 LAB
ANION GAP SERPL CALCULATED.3IONS-SCNC: 10.9 MMOL/L
BUN BLD-MCNC: 12 MG/DL (ref 8–23)
BUN/CREAT SERPL: 13.3 (ref 7–25)
CALCIUM SPEC-SCNC: 9 MG/DL (ref 8.6–10.5)
CHLORIDE SERPL-SCNC: 98 MMOL/L (ref 98–107)
CO2 SERPL-SCNC: 31.1 MMOL/L (ref 22–29)
CREAT BLD-MCNC: 0.9 MG/DL (ref 0.57–1)
DEPRECATED RDW RBC AUTO: 48.4 FL (ref 37–54)
ERYTHROCYTE [DISTWIDTH] IN BLOOD BY AUTOMATED COUNT: 14.8 % (ref 11.7–13)
GFR SERPL CREATININE-BSD FRML MDRD: 63 ML/MIN/1.73
GLUCOSE BLD-MCNC: 129 MG/DL (ref 65–99)
HCT VFR BLD AUTO: 28.2 % (ref 35.6–45.5)
HGB BLD-MCNC: 8.4 G/DL (ref 11.9–15.5)
MCH RBC QN AUTO: 26.9 PG (ref 26.9–32)
MCHC RBC AUTO-ENTMCNC: 29.8 G/DL (ref 32.4–36.3)
MCV RBC AUTO: 90.4 FL (ref 80.5–98.2)
PLATELET # BLD AUTO: 273 10*3/MM3 (ref 140–500)
PMV BLD AUTO: 10.5 FL (ref 6–12)
POTASSIUM BLD-SCNC: 4.2 MMOL/L (ref 3.5–5.2)
RBC # BLD AUTO: 3.12 10*6/MM3 (ref 3.9–5.2)
SODIUM BLD-SCNC: 140 MMOL/L (ref 136–145)
WBC NRBC COR # BLD: 9.91 10*3/MM3 (ref 4.5–10.7)

## 2017-08-22 PROCEDURE — 97110 THERAPEUTIC EXERCISES: CPT

## 2017-08-22 PROCEDURE — 25010000002 MORPHINE SULFATE (PF) 2 MG/ML SOLUTION: Performed by: INTERNAL MEDICINE

## 2017-08-22 PROCEDURE — 94799 UNLISTED PULMONARY SVC/PX: CPT

## 2017-08-22 PROCEDURE — 80048 BASIC METABOLIC PNL TOTAL CA: CPT | Performed by: INTERNAL MEDICINE

## 2017-08-22 PROCEDURE — 74000 HC ABDOMEN KUB: CPT

## 2017-08-22 PROCEDURE — 85027 COMPLETE CBC AUTOMATED: CPT | Performed by: INTERNAL MEDICINE

## 2017-08-22 PROCEDURE — 25010000002 METHYLPREDNISOLONE PER 40 MG: Performed by: INTERNAL MEDICINE

## 2017-08-22 PROCEDURE — 99233 SBSQ HOSP IP/OBS HIGH 50: CPT | Performed by: INTERNAL MEDICINE

## 2017-08-22 PROCEDURE — 99231 SBSQ HOSP IP/OBS SF/LOW 25: CPT | Performed by: INTERNAL MEDICINE

## 2017-08-22 RX ADMIN — SUCRALFATE 1 G: 1 SUSPENSION ORAL at 17:25

## 2017-08-22 RX ADMIN — FUROSEMIDE 40 MG: 40 TABLET ORAL at 08:19

## 2017-08-22 RX ADMIN — SUCRALFATE 1 G: 1 SUSPENSION ORAL at 22:12

## 2017-08-22 RX ADMIN — CETIRIZINE HYDROCHLORIDE 10 MG: 10 TABLET, FILM COATED ORAL at 08:18

## 2017-08-22 RX ADMIN — CYCLOBENZAPRINE HYDROCHLORIDE 10 MG: 10 TABLET, FILM COATED ORAL at 22:09

## 2017-08-22 RX ADMIN — NYSTATIN 500000 UNITS: 100000 SUSPENSION ORAL at 22:09

## 2017-08-22 RX ADMIN — IPRATROPIUM BROMIDE AND ALBUTEROL SULFATE 3 ML: .5; 3 SOLUTION RESPIRATORY (INHALATION) at 23:04

## 2017-08-22 RX ADMIN — PANTOPRAZOLE SODIUM 40 MG: 40 TABLET, DELAYED RELEASE ORAL at 05:58

## 2017-08-22 RX ADMIN — MORPHINE SULFATE 1 MG: 2 INJECTION, SOLUTION INTRAMUSCULAR; INTRAVENOUS at 18:52

## 2017-08-22 RX ADMIN — FUROSEMIDE 40 MG: 40 TABLET ORAL at 17:25

## 2017-08-22 RX ADMIN — BUDESONIDE AND FORMOTEROL FUMARATE DIHYDRATE 2 PUFF: 160; 4.5 AEROSOL RESPIRATORY (INHALATION) at 19:41

## 2017-08-22 RX ADMIN — MORPHINE SULFATE 1 MG: 2 INJECTION, SOLUTION INTRAMUSCULAR; INTRAVENOUS at 23:14

## 2017-08-22 RX ADMIN — FERROUS SULFATE TAB 325 MG (65 MG ELEMENTAL FE) 325 MG: 325 (65 FE) TAB at 08:18

## 2017-08-22 RX ADMIN — NYSTATIN 500000 UNITS: 100000 SUSPENSION ORAL at 17:25

## 2017-08-22 RX ADMIN — IPRATROPIUM BROMIDE AND ALBUTEROL SULFATE 3 ML: .5; 3 SOLUTION RESPIRATORY (INHALATION) at 19:34

## 2017-08-22 RX ADMIN — MULTIPLE VITAMINS W/ MINERALS TAB 1 TABLET: TAB at 08:19

## 2017-08-22 RX ADMIN — SERTRALINE 100 MG: 100 TABLET, FILM COATED ORAL at 08:18

## 2017-08-22 RX ADMIN — GUAIFENESIN 600 MG: 600 TABLET, EXTENDED RELEASE ORAL at 22:09

## 2017-08-22 RX ADMIN — CARVEDILOL 12.5 MG: 12.5 TABLET, FILM COATED ORAL at 08:18

## 2017-08-22 RX ADMIN — ROPINIROLE 2 MG: 2 TABLET, FILM COATED ORAL at 22:09

## 2017-08-22 RX ADMIN — BUDESONIDE AND FORMOTEROL FUMARATE DIHYDRATE 2 PUFF: 160; 4.5 AEROSOL RESPIRATORY (INHALATION) at 07:13

## 2017-08-22 RX ADMIN — TEMAZEPAM 30 MG: 15 CAPSULE ORAL at 22:10

## 2017-08-22 RX ADMIN — NYSTATIN 500000 UNITS: 100000 SUSPENSION ORAL at 12:11

## 2017-08-22 RX ADMIN — HYDROCODONE BITARTRATE AND ACETAMINOPHEN 1 TABLET: 5; 325 TABLET ORAL at 06:32

## 2017-08-22 RX ADMIN — HYDROCODONE BITARTRATE AND ACETAMINOPHEN 1 TABLET: 5; 325 TABLET ORAL at 11:41

## 2017-08-22 RX ADMIN — LISINOPRIL 5 MG: 5 TABLET ORAL at 08:18

## 2017-08-22 RX ADMIN — SUCRALFATE 1 G: 1 SUSPENSION ORAL at 05:59

## 2017-08-22 RX ADMIN — HYDROCODONE BITARTRATE AND ACETAMINOPHEN 1 TABLET: 5; 325 TABLET ORAL at 22:10

## 2017-08-22 RX ADMIN — IPRATROPIUM BROMIDE AND ALBUTEROL SULFATE 3 ML: .5; 3 SOLUTION RESPIRATORY (INHALATION) at 07:13

## 2017-08-22 RX ADMIN — SUCRALFATE 1 G: 1 SUSPENSION ORAL at 12:11

## 2017-08-22 RX ADMIN — IPRATROPIUM BROMIDE AND ALBUTEROL SULFATE 3 ML: .5; 3 SOLUTION RESPIRATORY (INHALATION) at 14:36

## 2017-08-22 RX ADMIN — IPRATROPIUM BROMIDE AND ALBUTEROL SULFATE 3 ML: .5; 3 SOLUTION RESPIRATORY (INHALATION) at 03:58

## 2017-08-22 RX ADMIN — MORPHINE SULFATE 1 MG: 2 INJECTION, SOLUTION INTRAMUSCULAR; INTRAVENOUS at 03:18

## 2017-08-22 RX ADMIN — GUAIFENESIN 600 MG: 600 TABLET, EXTENDED RELEASE ORAL at 08:18

## 2017-08-22 RX ADMIN — METHYLPREDNISOLONE SODIUM SUCCINATE 40 MG: 40 INJECTION, POWDER, FOR SOLUTION INTRAMUSCULAR; INTRAVENOUS at 22:09

## 2017-08-22 RX ADMIN — CARVEDILOL 12.5 MG: 12.5 TABLET, FILM COATED ORAL at 17:26

## 2017-08-22 RX ADMIN — NYSTATIN 500000 UNITS: 100000 SUSPENSION ORAL at 08:19

## 2017-08-22 RX ADMIN — HYDROCORTISONE ACETATE 25 MG: 25 SUPPOSITORY RECTAL at 08:19

## 2017-08-22 RX ADMIN — FERROUS SULFATE TAB 325 MG (65 MG ELEMENTAL FE) 325 MG: 325 (65 FE) TAB at 17:25

## 2017-08-22 RX ADMIN — ATORVASTATIN CALCIUM 40 MG: 20 TABLET, FILM COATED ORAL at 08:19

## 2017-08-22 RX ADMIN — IPRATROPIUM BROMIDE AND ALBUTEROL SULFATE 3 ML: .5; 3 SOLUTION RESPIRATORY (INHALATION) at 11:44

## 2017-08-22 RX ADMIN — HYDROCODONE BITARTRATE AND ACETAMINOPHEN 1 TABLET: 5; 325 TABLET ORAL at 16:28

## 2017-08-22 RX ADMIN — ASPIRIN 81 MG: 81 TABLET ORAL at 08:18

## 2017-08-22 RX ADMIN — METHYLPREDNISOLONE SODIUM SUCCINATE 40 MG: 40 INJECTION, POWDER, FOR SOLUTION INTRAMUSCULAR; INTRAVENOUS at 08:19

## 2017-08-22 NOTE — PLAN OF CARE
Problem: Patient Care Overview (Adult)  Goal: Plan of Care Review  Outcome: Ongoing (interventions implemented as appropriate)    08/22/17 1426   Coping/Psychosocial Response Interventions   Plan Of Care Reviewed With patient   Patient Care Overview   Progress no change   Outcome Evaluation   Outcome Summary/Follow up Plan waiting for barium to clear for CT scan, pain controlled with lortab.        Goal: Adult Individualization and Mutuality  Outcome: Ongoing (interventions implemented as appropriate)    Problem: Fall Risk (Adult)  Goal: Identify Related Risk Factors and Signs and Symptoms  Outcome: Outcome(s) achieved Date Met:  08/22/17  Goal: Absence of Falls  Outcome: Ongoing (interventions implemented as appropriate)    Problem: Pain, Acute (Adult)  Goal: Identify Related Risk Factors and Signs and Symptoms  Outcome: Outcome(s) achieved Date Met:  08/22/17  Goal: Acceptable Pain Control/Comfort Level  Outcome: Ongoing (interventions implemented as appropriate)    Problem: GI Endoscopy (Adult)  Goal: Signs and Symptoms of Listed Potential Problems Will be Absent or Manageable (GI Endoscopy)  Outcome: Ongoing (interventions implemented as appropriate)    Problem: Respiratory Insufficiency (Adult)  Goal: Identify Related Risk Factors and Signs and Symptoms  Outcome: Outcome(s) achieved Date Met:  08/22/17  Goal: Acid/Base Balance  Outcome: Ongoing (interventions implemented as appropriate)  Goal: Effective Ventilation  Outcome: Ongoing (interventions implemented as appropriate)

## 2017-08-22 NOTE — PROGRESS NOTES
"Adult Nutrition  Assessment/PES    Patient Name:  Paz Browne  YOB: 1955  MRN: [id number]  Admit Date:  8/9/2017    Assessment Date:  8/22/2017        Reason for Assessment       08/22/17 1540    Reason for Assessment    Reason For Assessment/Visit length of stay    Diagnosis --   GI hemorrhage                Anthropometrics       08/22/17 1540    Anthropometrics (Special Considerations)    Height Used for Calculations 1.702 m (5' 7\")    Weight Used for Calculations 75.3 kg (166 lb)    RD Calculated     RD Calculated %     RD Calculated BMI (kg/m2) 26    Body Mass Index (BMI)    BMI Grade 25 - 29.9 - overweight            Labs/Tests/Procedures/Meds       08/22/17 1540    Labs/Tests/Procedures/Meds    Diagnostic Test/Procedure Review reviewed    Labs/Tests Review Reviewed;Glucose    Medication Review Reviewed, pertinent    Significant Vitals reviewed            Physical Findings       08/22/17 1541    Physical Findings/Assessment    Additional Documentation --   B=21            Estimated/Assessed Needs       08/22/17 1541    Calculation Measurements    Weight Used For Calculations 75.3 kg (166 lb)    Height Used for Calculations 1.702 m (5' 7\")    Estimated/Assessed Energy Needs    Energy Need Method Kcal/kg    kcal/kg 25    25 Kcal/Kg (kcal) 1882.42    Estimated Kcal Range  4624-3744    Estimated/Assessed Protein Needs    Weight Used for Protein Calculation 75.3 kg (166 lb)    Protein (gm/kg) 0.8    0.8 Gm Protein (gm) 60.24    Estimated/Assessed Fluid Needs    Fluid Need Method RDA method    RDA Method (mL)  1500            Nutrition Prescription Ordered       08/22/17 1542    Nutrition Prescription PO    Common Modifiers Cardiac            Evaluation of Received Nutrient/Fluid Intake       08/22/17 1542    PO Evaluation    Number of Meals 6    % PO Intake 50              Problem/Interventions:        Problem 1       08/22/17 1542    Nutrition Diagnoses Problem 1    Problem 1 " Inadequate Nutrient Intake    Etiology (related to) MNT for Treatment/Condition    Signs/Symptoms (evidenced by) PO Intake    Percent (%) intake recorded 50 %    Over number of meals 6                    Intervention Goal       08/22/17 1547    Intervention Goal    General Maintain nutrition    PO Tolerate PO;Increase intake    Weight Maintain weight            Nutrition Intervention       08/22/17 1541    Nutrition Intervention    RD/Tech Action Care plan reviewd;Follow Tx progress;Interview for preference;Encourage intake;Supplement provided            Nutrition Prescription       08/22/17 1543    Nutrition Prescription PO    PO Prescription Begin/change supplement    Supplement Ensure    Supplement Frequency Daily    New PO Prescription Ordered? Yes            Education/Evaluation       08/22/17 5108    Education    Education Will Instruct as appropriate    Monitor/Evaluation    Monitor Per protocol    Education Follow-up Reinforce PRN          Electronically signed by:  Opal Baptiste RD  08/22/17 3:45 PM

## 2017-08-22 NOTE — PROGRESS NOTES
Name: Paz Browne ADMIT: 2017   : 1955  PCP: Javan Martinez MD    MRN: [id number] LOS: 13 days   AGE/SEX: 62 y.o. female  ROOM: [facility]/   Subjective   Subjective  No CP SOA NV. Loose stool after bowel regimen, denies melena. Abd pain somewhat improved today, still right sided.    Objective   Vital Signs  Temp:  [97.6 °F (36.4 °C)-98.4 °F (36.9 °C)] 97.6 °F (36.4 °C)  Heart Rate:  [58-78] 68  Resp:  [18-24] 18  BP: (141-159)/(65-83) 159/72  SpO2:  [88 %-99 %] 98 %  on  Flow (L/min):  [3] 3;   O2 Device: nasal cannula  Body mass index is 26 kg/(m^2).    Physical Exam   Constitutional: She appears well-developed and well-nourished. No distress.   HENT:   Head: Normocephalic and atraumatic.   Eyes: EOM are normal. Pupils are equal, round, and reactive to light.   Neck: Normal range of motion. Neck supple.   Cardiovascular: Normal rate, regular rhythm and intact distal pulses.    Murmur (2/6 systolic) heard.  Pulmonary/Chest: Effort normal. She has decreased breath sounds. She has wheezes (mild end expiratory, L>R). She has no rales.   Abdominal: Soft. There is no tenderness. There is no rebound and no guarding.   Musculoskeletal: Normal range of motion. She exhibits no edema.   Neurological: She is alert. No cranial nerve deficit.   Skin: Skin is warm and dry. She is not diaphoretic.   Psychiatric: She has a normal mood and affect. Her behavior is normal.   Nursing note and vitals reviewed.      Results Review:       I reviewed the patient's new clinical results. Reviewed imaging, agree with interpretation.    Results from last 7 days  Lab Units 17  0445 17  0451 17  0431 17  0440 17  0334 17  0743 17  0349   WBC 10*3/mm3 9.91 10.28 9.09 7.63 5.99 5.55 7.41   HEMOGLOBIN g/dL 8.4* 9.0* 9.8* 8.8* 8.6* 8.6* 8.8*   PLATELETS 10*3/mm3 273 276 271 198 181 165 176     Results from last 7 days  Lab Units 17  0445 17  0451 17  0431 17  0440  08/18/17  0334 08/17/17  0743 08/16/17  0349   SODIUM mmol/L 140 143 139 140 140 142 133*   POTASSIUM mmol/L 4.2 3.2* 3.9 3.5 3.8 3.8 4.1   CHLORIDE mmol/L 98 98 96* 98 101 102 95*   CO2 mmol/L 31.1* 32.2* 25.0 26.9 25.3 28.0 26.1   BUN mg/dL 12 12 12 16 17 23 41*   CREATININE mg/dL 0.90 0.88 0.86 0.90 0.87 1.00 1.70*   GLUCOSE mg/dL 129* 139* 145* 111* 105* 107* 109*   Estimated Creatinine Clearance: 66.9 mL/min (by C-G formula based on Cr of 0.9).  Results from last 7 days  Lab Units 08/22/17  0445 08/21/17  0451 08/20/17  0431 08/19/17  0440 08/18/17  0334 08/17/17  0743 08/16/17  1201 08/16/17  0349   CALCIUM mg/dL 9.0 9.0 9.5 9.3 9.0 8.7  --  8.5*   ALBUMIN g/dL  --   --   --  4.10  --  4.00 3.80  --          aspirin 81 mg Oral Daily   atorvastatin 40 mg Oral Daily   budesonide-formoterol 2 puff Inhalation BID - RT   carvedilol 12.5 mg Oral BID With Meals   cetirizine 10 mg Oral Daily   cyclobenzaprine 10 mg Oral Nightly   ferrous sulfate 325 mg Oral BID With Meals   furosemide 40 mg Oral BID   guaiFENesin 600 mg Oral Q12H   hydrocortisone 25 mg Rectal BID   ipratropium-albuterol 3 mL Nebulization Q4H - RT   lisinopril 5 mg Oral Q24H   methylPREDNISolone sodium succinate 40 mg Intravenous Q12H   multivitamin with minerals 1 tablet Oral Daily   nystatin 5 mL Oral 4x Daily   pantoprazole 40 mg Oral QAM   polyethylene glycol 17 g Oral BID   rOPINIRole 2 mg Oral Nightly   sertraline 100 mg Oral Daily   sucralfate 1 g Oral Q6H       sodium chloride 30 mL/hr Last Rate: 30 mL/hr (08/17/17 1059)   Diet Regular; Cardiac      Assessment/Plan   Assessment:     Active Hospital Problems (** Indicates Principal Problem)    Diagnosis Date Noted   • **Anemia [D64.9] 08/09/2017   • VAN (acute kidney injury) [N17.9] 08/15/2017   • Oral candidiasis [B37.0] 08/14/2017   • Chronic respiratory failure with hypoxia [J96.11] 08/13/2017   • History of mitral valve replacement with tissue graft [Z95.4] 08/11/2017   • Precordial pain  [R07.2] 08/11/2017   • Lower GI bleed [K92.2] 08/09/2017   • Long term current use of anticoagulant [Z79.01] 10/13/2016   • Pulmonary hypertension [I27.2]    • Hypertension [I10]    • COPD (chronic obstructive pulmonary disease) [J44.9]    • PAF (paroxysmal atrial fibrillation) [I48.0] 01/25/2016      Resolved Hospital Problems    Diagnosis Date Noted Date Resolved   No resolved problems to display.       Plan:   - Anemia: GI losses. CScope with clipped bleeding hemorrhoid. SBFT with stenosis and esophagitis. EGD with gastritis and she did not require esophageal dilation. Abd pain still present, KUB with passing of previous contrast. To repeat CT today. Hgb decreased again, will recheck FOBT. GI following.  - PAF: Rate control. Plan for discharge with ASA and off coumadin. Cardiology following.   - COPD AE/Chronic Hypoxic Resp Failure/Pulm HTN: On home 3L. Steroid PO. Improving. Pulmonology following.  - VAN: Resolved.      Disposition  HH/possibly tomorrow    Nick Tovar MD  [facility]  08/22/17  11:30 AM

## 2017-08-22 NOTE — SIGNIFICANT NOTE
08/22/17 0830   Rehab Treatment   Discipline physical therapy assistant   Treatment Not Performed patient/family decline treatment, pt not feeling well  (Pt has politely declined PT at this time stating that she has had frequent diarrhea.  PT to follow-up in PM. )   Recommendation   PT - Next Appointment 08/22/17

## 2017-08-22 NOTE — PLAN OF CARE
Problem: Patient Care Overview (Adult)  Goal: Plan of Care Review  Outcome: Ongoing (interventions implemented as appropriate)    08/22/17 0218   Coping/Psychosocial Response Interventions   Plan Of Care Reviewed With patient   Patient Care Overview   Progress improving   Outcome Evaluation   Outcome Summary/Follow up Plan frequent loose bms due to laxative to remove barium pain controlled          Problem: Fall Risk (Adult)  Goal: Identify Related Risk Factors and Signs and Symptoms  Outcome: Ongoing (interventions implemented as appropriate)    Problem: Pain, Acute (Adult)  Goal: Identify Related Risk Factors and Signs and Symptoms  Outcome: Ongoing (interventions implemented as appropriate)    Problem: GI Endoscopy (Adult)  Goal: Signs and Symptoms of Listed Potential Problems Will be Absent or Manageable (GI Endoscopy)  Outcome: Ongoing (interventions implemented as appropriate)    Problem: Respiratory Insufficiency (Adult)  Goal: Identify Related Risk Factors and Signs and Symptoms  Outcome: Ongoing (interventions implemented as appropriate)

## 2017-08-22 NOTE — PROGRESS NOTES
"CC: PAF/MVR    Interval History:   Just feels very weak.    Vital Signs  Temp:  [97.6 °F (36.4 °C)-98.4 °F (36.9 °C)] 97.6 °F (36.4 °C)  Heart Rate:  [58-78] 68  Resp:  [16-24] 18  BP: (108-159)/(56-83) 159/72    Intake/Output Summary (Last 24 hours) at 08/22/17 0736  Last data filed at 08/21/17 1200   Gross per 24 hour   Intake              500 ml   Output              800 ml   Net             -300 ml     Flowsheet Rows         First Filed Value    Admission Height  67\" (170.2 cm) Documented at 08/09/2017 1152    Admission Weight  166 lb (75.3 kg) Documented at 08/09/2017 1152          PHYSICAL EXAM:  General: No acute distress  Resp:NL Rate, unlabored, Diffuse coarse rales And  wheezing   CV:NL rate and rhythm, NL PMI, Nl S1 and S2, 2/6 systolic [place], no gallop, no rub, No JVD. Normal pedal pulses  ABD:Nl sounds, no masses or tenderness, nondistended, no quarding or rebound  Neuro: alert,cooperative and oriented  Extr: 1+ bilateral tibial edema or no cyanosis, moves all extremities      Results Review:      Results from last 7 days  Lab Units 08/22/17  0445   SODIUM mmol/L 140   POTASSIUM mmol/L 4.2   CHLORIDE mmol/L 98   CO2 mmol/L 31.1*   BUN mg/dL 12   CREATININE mg/dL 0.90   GLUCOSE mg/dL 129*   CALCIUM mg/dL 9.0           Results from last 7 days  Lab Units 08/22/17  0445   WBC 10*3/mm3 9.91   HEMOGLOBIN g/dL 8.4*   HEMATOCRIT % 28.2*   PLATELETS 10*3/mm3 273       Results from last 7 days  Lab Units 08/21/17  0451 08/20/17  0431 08/19/17  0440  08/17/17  0743   INR  1.84* 2.08* 2.22*  < > 1.81*   APTT seconds  --   --   --   --  48.8*   < > = values in this interval not displayed.              @LABRCNT(bnp)@  I reviewed the patient's new clinical results.  I personally viewed and interpreted the patient's EKG/Telemetry data        Medication Review:   Meds reviewed      sodium chloride 30 mL/hr Last Rate: 30 mL/hr (08/17/17 6104)       Assessment/Plan  1.  GI bleed.  Status post polypectomy. Also with " hemorrhoids.  Upper endoscopy showed gastritis no stricture.  Hemoglobin Is dropping again.  Per gastroenterology.  2.  Atrial fibrillation/flutter.  Status post atrial appendage closure and cryo-maze procedure no documented recurrence since her cardioversion in 2014.  We'll maintain her off warfarin..  On aspirin daily she does appear to still be in sinus rhythm.  3.  History of severe mitral and tricuspid insufficiency status post mitral replacement with tissue valve and tricuspid valve repair.  4.  Marked pulmonary hypertension.  Not a transplant candidate follows with pulmonary.  5.  History of hypertension blood pressure adequately controlled.  6.  Renal insufficiency improved  8.  Hyperlipidemia.  9.  Volume overload.   now stable continue the same.        Earnest Gan MD  08/22/17  7:36 AM

## 2017-08-22 NOTE — THERAPY TREATMENT NOTE
Acute Care - Physical Therapy Treatment Note  [facility]     Patient Name: Paz Browne  : 1955  MRN: [id number]  Today's Date: 2017             Admit Date: 2017    Visit Dx:    ICD-10-CM ICD-9-CM   1. Lower GI bleed K92.2 578.9   2. Dysplastic polyp of colon K63.5 211.3   3. Abnormal esophagram R93.3 793.4   4. Anemia, unspecified type D64.9 285.9   5. Difficulty walking R26.2 719.7   6. Chronic respiratory failure with hypoxia J96.11 518.83     799.02   7. Primary osteoarthritis involving multiple joints M15.0 715.09     Patient Active Problem List   Diagnosis   • PAF (paroxysmal atrial fibrillation)   • Hypertension   • Hyperlipidemia   • COPD (chronic obstructive pulmonary disease)   • Pain medication agreement   • Hiatal hernia   • Primary osteoarthritis involving multiple joints   • Pulmonary hypertension   • S/P TVR (tricuspid valve repair)   • Long term current use of anticoagulant   • Pulmonary nodule   • Hemoptysis   • RLS (restless legs syndrome)   • Chronic low back pain   • Dysplastic polyp of colon   • Lower GI bleed   • History of mitral valve replacement with tissue graft   • Precordial pain   • Chronic respiratory failure with hypoxia   • Oral candidiasis   • VAN (acute kidney injury)   • Anemia               Adult Rehabilitation Note       17 1540 17 1022 17 1100    Rehab Assessment/Intervention    Discipline physical therapy assistant  -HAIDER physical therapy assistant  -HAIDER physical therapy assistant  -SI    Document Type therapy note (daily note)  -HAIDER therapy note (daily note)  -HAIDER therapy note (daily note)  -SI    Subjective Information agree to therapy  -HAIDER agree to therapy  -HAIDER agree to therapy   Pt reports at home on 2 L/min O2 at rest, 5 L/min when oob  -SI    Patient Effort, Rehab Treatment good  -HAIDER good  -HAIDER excellent  -SI    Symptoms Noted During/After Treatment   fatigue   some wheezing  -SI    Symptoms Noted Comment   one standing rest break   -SI     Precautions/Limitations fall precautions;oxygen therapy device and L/min  -HAIDER fall precautions;oxygen therapy device and L/min  -HAIDER fall precautions;oxygen therapy device and L/min  -SI    Recorded by [HAIDER] Jaleel Sheppard PTA [HAIDER] Jaleel Sheppard PTA [SI] Amrita Loredo PTA    Vital Signs    Pre SpO2 (%)  96  -HAIDER 94  -SI    O2 Delivery Pre Treatment  supplemental O2  -HAIDER supplemental O2  -SI    Post SpO2 (%)  94  -HAIDER 96  -SI    O2 Delivery Post Treatment  supplemental O2  -HAIDER supplemental O2   5 L/min  -SI    Recorded by  [HAIDER] Jaleel Sheppard PTA [SI] Amrita Loredo PTA    Pain Assessment    Pain Assessment No/denies pain  -HAIDER No/denies pain  -HAIDER Rolle-Sandoval FACES  -SI    Rolle-Sandoval FACES Pain Rating   2  -SI    Pain Type   Chronic pain  -SI    Pain Location   Abdomen  -SI    Pain Orientation   Left  -SI    Recorded by [HAIDER] Jaleel Sheppard PTA [HAIDER] Jaleel Sheppard PTA [SI] Amrita Loredo PTA    Cognitive Assessment/Intervention    Current Cognitive/Communication Assessment functional  -HAIDER functional  -HAIDER functional  -SI    Orientation Status oriented x 4  -HAIDER oriented x 4  -HAIDER     Follows Commands/Answers Questions 100% of the time  -HAIDER 100% of the time  -HAIDER 100% of the time  -SI    Personal Safety WNL/WFL  -HAIDER WNL/WFL  -HAIDRE WNL/WFL  -SI    Personal Safety Interventions fall prevention program maintained;gait belt;nonskid shoes/slippers when out of bed  -HAIDER fall prevention program maintained;gait belt;nonskid shoes/slippers when out of bed  -HAIDER fall prevention program maintained  -SI    Recorded by [HAIDER] Jaleel Sheppard PTA [HAIDER] Jaleel Sheppard PTA [SI] Amrita Loredo PTA    Bed Mobility, Assessment/Treatment    Bed Mobility, Assistive Device bed rails;head of bed elevated  -HAIDER bed rails;head of bed elevated  -HAIDER bed rails  -SI    Bed Mob, Supine to Sit, [place] conditional independence  -HAIDER supervision required  -HAIDER supervision required  -SI    Bed Mob, Sit to Supine, [place] conditional independence  -HAIDER supervision  required  -HAIDER supervision required  -SI    Bed Mobility, Impairments strength decreased  -HAIDER strength decreased  -HAIDER     Recorded by [HAIDER] Jaleel Sheppard PTA [HAIDER] Jaleel Sheppard PTA [SI] Amrita Loredo PTA    Transfer Assessment/Treatment    Transfers, Sit-Stand [place] conditional independence  -HAIDER supervision required  -HAIDER contact guard assist  -SI    Transfers, Stand-Sit [place] conditional independence  -HAIDER supervision required  -HAIDER contact guard assist  -SI    Transfers, Sit-Stand-Sit, Assist Device rolling walker  -HAIDER rolling walker  -HAIDER rolling walker  -SI    Transfer, Impairments  strength decreased;impaired balance  -HAIDER     Recorded by [HAIDER] Jaleel Sheppard PTA [HAIDER] Jaleel Sheppard PTA [SI] Amrita Loredo PTA    Gait Assessment/Treatment    Gait, [place] Level conditional independence  -HAIDER contact guard assist;verbal cues required  -HAIDER contact guard assist  -SI    Gait, Assistive Device rolling walker  -HAIDER rolling walker  -HAIDER rolling walker  -SI    Gait, Distance (Feet) 150  -HAIDER 150   No rest break  -HAIDER 150   one standing rest break  -SI    Gait, Gait Deviations kassidy decreased;forward flexed posture;limb motion velocity decreased;step length decreased  -HAIDER kassidy decreased;forward flexed posture;limb motion velocity decreased;step length decreased  -HAIDER --   slow  -SI    Gait, Safety Issues supplemental O2;weight-shifting ability decreased  -HAIDER step length decreased;weight-shifting ability decreased;supplemental O2  -HAIDER supplemental O2;step length decreased  -SI    Gait, Impairments strength decreased  -HAIDER strength decreased;impaired balance  -HAIDER     Gait, Comment   Fatigued but she was pleased and eager for activity  -SI    Recorded by [HAIDER] Jaleel Sheppard PTA [HAIDER] Jaleel Sheppard PTA [SI] Amrita Loredo PTA    Therapy Exercises    Bilateral Lower Extremities AROM:;10 reps;ankle pumps/circles;LAQ;hip flexion  -HAIDER AROM:;10 reps;ankle pumps/circles;LAQ;hip flexion  -HAIDER     Recorded by [HAIDER] Jaleel Sheppard,  PTA [HAIDER] Jaleel Sheppard PTA     Positioning and Restraints    Pre-Treatment Position in bed  -HAIDER in bed  -HAIDER in bed  -SI    Post Treatment Position bed  -HAIDER chair  -HAIDER --   sitting EOB for lunch and with   -SI    In Bed sitting EOB;call light within reach;encouraged to call for assist;with nsg  -HAIDER  sitting EOB;call light within reach;with family/caregiver  -SI    In Chair  sitting;call light within reach;encouraged to call for assist;notified nsg;with other staff   w/ MD BAKER     Recorded by [HAIDER] Jaleel Sheppard, AYUSH [HAIDER] Jaleel Sheppard, PTA [SI] Amrita Loredo PTA      User Key  (r) = Recorded By, (t) = Taken By, (c) = Cosigned By    Initials Name Effective Dates    RAKAN Loredo, PTA 05/18/15 -     HAIDER Jaleel Sheppard, AYUSH 04/24/15 -                 IP PT Goals       08/22/17 1558 08/19/17 0953       Bed Mobility PT LTG    Bed Mobility PT LTG, Date Established  08/19/17  -DO     Bed Mobility PT LTG, Time to Achieve  1 wk  -DO     Bed Mobility PT LTG, Activity Type  all bed mobility  -DO     Bed Mobility PT LTG, [place] Level  independent  -DO     Transfer Training PT LTG    Transfer Training PT LTG, Date Established  08/19/17  -DO     Transfer Training PT LTG, Time to Achieve  1 wk  -DO     Transfer Training PT LTG, Activity Type  all transfers  -DO     Transfer Training PT LTG, [place] Level  conditional independence  -DO     Transfer Training PT LTG, Assist Device  walker, rolling  -DO     Gait Training PT LTG    Gait Training Goal PT LTG, Date Established  08/19/17  -DO     Gait Training Goal PT LTG, Time to Achieve  1 wk  -DO     Gait Training Goal PT LTG, [place] Level  conditional independence  -DO     Gait Training Goal PT LTG, Assist Device  walker, rolling  -DO     Gait Training Goal PT LTG, Distance to Achieve  150 ft  -DO     Gait Training Goal PT LTG, Date Goal Reviewed 08/22/17  -HAIDER      Gait Training Goal PT LTG, Outcome goal met  -HAIDER        User Key  (r) = Recorded By, (t) = Taken By,  (c) = Cosigned By    Initials Name Provider Type    DO Jules Parra, PT Physical Therapist    HAIDER Sheppard, AYUSH Physical Therapy Assistant          Physical Therapy Education     Title: PT OT SLP Therapies (Resolved)     Topic: Physical Therapy (Resolved)     Point: Mobility training (Resolved)    Learning Progress Summary    Learner Readiness Method Response Comment Documented by Status   Patient Acceptance E VU  HAIDER 08/22/17 1558 Done    Acceptance E VU  HAIDER 08/21/17 1042 Done    Eager E DU mobility and safety SI 08/20/17 1150 Done    Acceptance E,D VU,DU  DO 08/19/17 0953 Done               Point: Home exercise program (Resolved)    Learning Progress Summary    Learner Readiness Method Response Comment Documented by Status   Patient Acceptance E VU  HAIDER 08/22/17 1558 Done    Acceptance E VU  HAIDER 08/21/17 1042 Done    Eager E DU mobility and safety SI 08/20/17 1150 Done    Acceptance E,D VU,DU  DO 08/19/17 0953 Done               Point: Body mechanics (Resolved)    Learning Progress Summary    Learner Readiness Method Response Comment Documented by Status   Patient Acceptance E VU  HAIDER 08/22/17 1558 Done    Acceptance E VU  HAIDER 08/21/17 1042 Done    Eager E DU mobility and safety SI 08/20/17 1150 Done    Acceptance E,D VU,DU  DO 08/19/17 0953 Done               Point: Precautions (Resolved)    Learning Progress Summary    Learner Readiness Method Response Comment Documented by Status   Patient Acceptance E VU  HAIDER 08/22/17 1558 Done    Acceptance E VU  HAIDER 08/21/17 1042 Done    Eager E DU mobility and safety SI 08/20/17 1150 Done    Acceptance E,D VU,DU  DO 08/19/17 0953 Done                      User Key     Initials Effective Dates Name Provider Type Discipline    SI 05/18/15 -  Amrita Loredo PTA Physical Therapy Assistant PT    DO 01/27/16 -  Jules Parra PT Physical Therapist PT    HAIDER 04/24/15 -  Jaleel Sheppard PTA Physical Therapy Assistant PT                    PT Recommendation and Plan  Anticipated  Equipment Needs At Discharge: front wheeled walker  Anticipated Discharge Disposition: home with assist, home with home health  Planned Therapy Interventions: bed mobility training, gait training, strengthening, transfer training  PT Frequency: daily  Plan of Care Review  Plan Of Care Reviewed With: patient  Outcome Summary/Follow up Plan: Pt has met all PT goals at this time.            Outcome Measures       08/22/17 1500 08/21/17 1000 08/20/17 1100    How much help from another person do you currently need...    Turning from your back to your side while in flat bed without using bedrails? 4  -HAIDER 4  -HAIDER 4  -SI    Moving from lying on back to sitting on the side of a flat bed without bedrails? 4  -HAIDER 4  -HAIDER 4  -SI    Moving to and from a bed to a chair (including a wheelchair)? 4  -HAIDER 3  -HAIDER 3  -SI    Standing up from a chair using your arms (e.g., wheelchair, bedside chair)? 4  -HAIDER 3  -HAIDER 3  -SI    Climbing 3-5 steps with a railing? 3  -HAIDER 3  -HAIDER 2  -SI    To walk in hospital room? 4  -HADIER 3  -HAIDER 3  -SI    AM-PAC 6 Clicks Score 23  -HAIDER 20  -HAIDER 19  -SI    Functional Assessment    Outcome Measure Options AM-PAC 6 Clicks Basic Mobility (PT)  -HAIDER AM-PAC 6 Clicks Basic Mobility (PT)  -HAIDER AM-PAC 6 Clicks Basic Mobility (PT)  -SI      User Key  (r) = Recorded By, (t) = Taken By, (c) = Cosigned By    Initials Name Provider Type    RAKAN Loredo PTA Physical Therapy Assistant    HAIDER Sheppard PTA Physical Therapy Assistant           Time Calculation:         PT Charges       08/22/17 1558 08/22/17 0830       Time Calculation    Start Time 1440  -HAIDER      Stop Time 1454  -HAIDER      Time Calculation (min) 14 min  -HAIDER      PT Received On 08/22/17  -HAIDER      PT - Next Appointment 08/23/17  -HAIDER 08/22/17  -HAIDER       User Key  (r) = Recorded By, (t) = Taken By, (c) = Cosigned By    Initials Name Provider Type    HAIDRE Sheppard PTA Physical Therapy Assistant          Therapy Charges for Today     Code Description Service Date  Service Provider Modifiers Qty    94446239219 HC PT THER PROC EA 15 MIN 8/21/2017 Jaleel Sheppard PTA GP 1    82487614709 HC PT THER PROC EA 15 MIN 8/22/2017 Jaleel Sheppard PTA GP 1          PT G-Codes  Outcome Measure Options: AM-PAC 6 Clicks Basic Mobility (PT)    Jaleel Sheppard PTA  8/22/2017

## 2017-08-22 NOTE — PROGRESS NOTES
[facility]  Inpatient Progress Note    Reason for Follow Up:  Abdominal pain, GI bleeding    Subjective     Interval History:   She could not have CT scan yesterday due to retained barium on KUB. Repeated mag citrate and miralax with numerous  + BMs overnight. Repeat KUB today and CT to follow.     Abdominal pain persists in LLQ and RUQ. + weakness    Current Facility-Administered Medications:   •  acetaminophen (TYLENOL) tablet 650 mg, 650 mg, Oral, Q6H PRN, Tata Newby MD  •  albuterol (PROVENTIL) nebulizer solution 0.083% 2.5 mg/3mL, 2.5 mg, Nebulization, Q4H PRN, Nick Tovar MD, 2.5 mg at 08/19/17 0410  •  aspirin EC tablet 81 mg, 81 mg, Oral, Daily, Earnest Gan MD, 81 mg at 08/22/17 0818  •  atorvastatin (LIPITOR) tablet 40 mg, 40 mg, Oral, Daily, Tata Newby MD, 40 mg at 08/22/17 0819  •  bisacodyl (DULCOLAX) suppository 10 mg, 10 mg, Rectal, Daily PRN, Tata Newby MD  •  budesonide-formoterol (SYMBICORT) 160-4.5 MCG/ACT inhaler 2 puff, 2 puff, Inhalation, BID - RT, Tata Newby MD, 2 puff at 08/22/17 0713  •  calcium carbonate (TUMS) chewable tablet 500 mg (200 mg elemental), 1 tablet, Oral, BID PRN, Tata Newby MD  •  carvedilol (COREG) tablet 12.5 mg, 12.5 mg, Oral, BID With Meals, Kirti Arteaga MD, 12.5 mg at 08/22/17 0818  •  cetirizine (zyrTEC) tablet 10 mg, 10 mg, Oral, Daily, Tata Newby MD, 10 mg at 08/22/17 0818  •  cyclobenzaprine (FLEXERIL) tablet 10 mg, 10 mg, Oral, Nightly, Tata Newby MD, 10 mg at 08/21/17 2207  •  ferrous sulfate tablet 325 mg, 325 mg, Oral, BID With Meals, Cleveland Devine MD, 325 mg at 08/22/17 0818  •  furosemide (LASIX) tablet 40 mg, 40 mg, Oral, BID, Earnest Gan MD, 40 mg at 08/22/17 0819  •  guaiFENesin (MUCINEX) 12 hr tablet 600 mg, 600 mg, Oral, Q12H, Nick Tovar MD, 600 mg at 08/22/17 0818  •  HYDROcodone-acetaminophen (NORCO) 5-325 MG per  tablet 1 tablet, 1 tablet, Oral, Q4H PRN, Nick Tovar MD, 1 tablet at 17 0632  •  hydrocortisone (ANUSOL-HC) suppository 25 mg, 25 mg, Rectal, BID, Earnest PALOMO MD, 25 mg at 17 0819  •  hyoscyamine (LEVSIN) SL tablet 125 mcg, 125 mcg, Sublingual, Q4H PRN, Montserrat Newby, APRN, 125 mcg at 17 1107  •  ipratropium-albuterol (DUO-NEB) nebulizer solution 3 mL, 3 mL, Nebulization, Q4H - RT, Guanako Crenshaw MD, 3 mL at 17 0713  •  lisinopril (PRINIVIL,ZESTRIL) tablet 5 mg, 5 mg, Oral, Q24H, Kirti Arteaga MD, 5 mg at 17 0818  •  magnesium hydroxide (MILK OF MAGNESIA) suspension 2400 mg/10mL 10 mL, 10 mL, Oral, Daily PRN, Tata Newby MD  •  meclizine (ANTIVERT) tablet 25 mg, 25 mg, Oral, TID PRN, Tata Newby MD  •  methylPREDNISolone sodium succinate (SOLU-Medrol) injection 40 mg, 40 mg, Intravenous, Q12H, Nick Tovar MD, 40 mg at 17 0819  •  Morphine sulfate (PF) injection 1 mg, 1 mg, Intravenous, Q4H PRN, Nick Tovar MD, 1 mg at 17 0318  •  multivitamin with minerals 1 tablet, 1 tablet, Oral, Daily, Tata Newby MD, 1 tablet at 17 0819  •  [] Morphine sulfate (PF) injection 2 mg, 2 mg, Intravenous, Q4H PRN, 2 mg at 17 1459 **AND** naloxone (NARCAN) injection 0.4 mg, 0.4 mg, Intravenous, Q5 Min PRN, Tata Newby MD  •  nystatin (MYCOSTATIN) 855741 UNIT/ML suspension 500,000 Units, 5 mL, Oral, 4x Daily, Fam Sanderson MD, 500,000 Units at 17 0819  •  ondansetron (ZOFRAN) injection 4 mg, 4 mg, Intravenous, Q6H PRN, Tata Newby MD, 4 mg at 17 2232  •  pantoprazole (PROTONIX) EC tablet 40 mg, 40 mg, Oral, QAM, Tata Newby MD, 40 mg at 17 0558  •  polyethylene glycol (MIRALAX) powder 17 g, 17 g, Oral, BID, Montserrat Newby, APRN  •  potassium chloride (MICRO-K) CR capsule 40 mEq, 40 mEq, Oral, PRN, 40 mEq at 17 8402 **OR** potassium  chloride (KLOR-CON) packet 40 mEq, 40 mEq, Oral, PRN **OR** potassium chloride 10 mEq in 100 mL IVPB, 10 mEq, Intravenous, Q1H PRN, Nick Tovar MD  •  rOPINIRole (REQUIP) tablet 2 mg, 2 mg, Oral, Nightly, Tata Newby MD, 2 mg at 08/21/17 2207  •  sertraline (ZOLOFT) tablet 100 mg, 100 mg, Oral, Daily, Tata Newby MD, 100 mg at 08/22/17 0818  •  sodium chloride (OCEAN) nasal spray 2 spray, 2 spray, Each Nare, PRN, Leonel Ruiz MD  •  sodium chloride 0.9 % flush 1-10 mL, 1-10 mL, Intravenous, PRN, Tata Newby MD  •  sodium chloride 0.9 % flush 10 mL, 10 mL, Intravenous, PRN, Amrit Reinoso MD  •  sodium chloride 0.9 % infusion, 30 mL/hr, Intravenous, Continuous PRN, Porsha Ruby MD, Last Rate: 30 mL/hr at 08/17/17 1059, 30 mL/hr at 08/17/17 1059  •  sucralfate (CARAFATE) 1 GM/10ML suspension 1 g, 1 g, Oral, Q6H, Porsha Ruby MD, 1 g at 08/22/17 0559  •  temazepam (RESTORIL) capsule 30 mg, 30 mg, Oral, Nightly PRN, Tata Newby MD, 30 mg at 08/21/17 9799  Review of Systems:    She has lower abdominal cramping with movement, all other systems reviewed and negative    Objective     Vital Signs  Temp:  [97.6 °F (36.4 °C)-98.4 °F (36.9 °C)] 97.6 °F (36.4 °C)  Heart Rate:  [58-78] 68  Resp:  [16-24] 18  BP: (108-159)/(56-83) 159/72  Body mass index is 26 kg/(m^2).    Intake/Output Summary (Last 24 hours) at 08/22/17 0944  Last data filed at 08/21/17 1200   Gross per 24 hour   Intake              500 ml   Output              400 ml   Net              100 ml           Physical Exam:   General: patient awake, alert and cooperative   Eyes: Normal lids and lashes, no scleral icterus   Neck: supple, normal ROM   Skin: warm and dry, not jaundiced   Cardiovascular: regular rhythm and rate, no murmurs auscultated   Pulm: clear to auscultation bilaterally, regular and unlabored   Abdomen: soft, tender LLQ and periumbilical, nondistended; normal bowel  sounds   Rectal: deferred   Extremities: no rash or edema   Psychiatric: Normal mood and behavior; memory intact     Results Review:     I reviewed the patient's new clinical results.      Results from last 7 days  Lab Units 08/22/17 0445 08/21/17 0451 08/20/17 0431   WBC 10*3/mm3 9.91 10.28 9.09   HEMOGLOBIN g/dL 8.4* 9.0* 9.8*   HEMATOCRIT % 28.2* 29.6* 31.8*   PLATELETS 10*3/mm3 273 276 271       Results from last 7 days  Lab Units 08/22/17 0445 08/21/17 0451 08/20/17  0431 08/19/17  0440  08/17/17  0743 08/16/17  1201   SODIUM mmol/L 140 143 139 140  < > 142  --    POTASSIUM mmol/L 4.2 3.2* 3.9 3.5  < > 3.8  --    CHLORIDE mmol/L 98 98 96* 98  < > 102  --    CO2 mmol/L 31.1* 32.2* 25.0 26.9  < > 28.0  --    BUN mg/dL 12 12 12 16  < > 23  --    CREATININE mg/dL 0.90 0.88 0.86 0.90  < > 1.00  --    CALCIUM mg/dL 9.0 9.0 9.5 9.3  < > 8.7  --    BILIRUBIN mg/dL  --   --   --  0.7  --  0.7 0.7   ALK PHOS U/L  --   --   --  99  --  98 79   ALT (SGPT) U/L  --   --   --  46*  --  44* 25   AST (SGOT) U/L  --   --   --  39*  --  61* 32   GLUCOSE mg/dL 129* 139* 145* 111*  < > 107*  --    < > = values in this interval not displayed.    Results from last 7 days  Lab Units 08/21/17 0451 08/20/17 0431 08/19/17 0440   INR  1.84* 2.08* 2.22*       Lab Results  Lab Value Date/Time   LIPASE 46 08/13/2017 0449       Radiology:  XR Chest 2 View   Final Result   There are chronic parenchymal changes in the lungs. There   has been sternotomy. The cardiac silhouette is enlarged and the   pulmonary vasculature appears more prominent than on some of the recent   prior exams, suggesting there may be an element of heart failure or   volume overload. However, there is no pulmonary edema nor is there any   infiltrate.       This report was finalized on 8/21/2017 8:53 PM by Dr. Akbar Kumar MD.          XR Abdomen KUB   Final Result   A substantial volume of contrast material remains within the   colon.       This report was  finalized on 8/21/2017 8:53 PM by Dr. Akbar Kumar MD.          XR Abdomen KUB   Final Result      XR Chest PA & Lateral   Final Result      FL Upper GI With Air Contrast & SBFT   Final Result      XR Chest PA & Lateral   Final Result      CT Abdomen Pelvis Without Contrast   Final Result   1. No evidence for acute intra-abdominal process.   2. Basilar pulmonary emphysema.   3. Hiatal hernia. Atherosclerotic disease. Previous cholecystectomy and   hysterectomy.       This report was finalized on 8/9/2017 3:26 PM by Dr. Magdi Greer MD.          CT Abdomen Pelvis With Contrast    (Results Pending)   XR Abdomen KUB    (Results Pending)       Assessment/Plan     Patient Active Problem List   Diagnosis   • PAF (paroxysmal atrial fibrillation)   • Hypertension   • Hyperlipidemia   • COPD (chronic obstructive pulmonary disease)   • Pain medication agreement   • Hiatal hernia   • Primary osteoarthritis involving multiple joints   • Pulmonary hypertension   • S/P TVR (tricuspid valve repair)   • Long term current use of anticoagulant   • Pulmonary nodule   • Hemoptysis   • RLS (restless legs syndrome)   • Chronic low back pain   • Dysplastic polyp of colon   • Lower GI bleed   • History of mitral valve replacement with tissue graft   • Precordial pain   • Chronic respiratory failure with hypoxia   • Oral candidiasis   • VAN (acute kidney injury)   • Anemia       BA [place]       Patient continues to complain of weakness, no report of active GI blood loss.  CT scan pending.        Impression:  #1 Lower GI bleeding: resolved. Hgb stable in mid 8 range. Onset in the postprocedural setting s/p colonoscopy with bleeding internal hemorrhoids. Congested mucosa in transverse colon and clips placed on 8/4/17  #2 anticoagulant therapy: Coumadin stopped 8/15 per cards due to continued drop in Hgb, OK with GI for asa with plans to remain off AC  #3 Abdominal discomfort- SBFT with no obstructive process,  evidence of esophagitis and mild stenosis. EGD 8/17/17 with gastritis.  Still with c/o abdominal discomfort, not related to po intake but appears unchanged over last several days.  + BM's overnight  #4 ANNA- oral iron therapy initiated      Plan:  - continue PPI and carafate  - OK to start asa, per cards note AC to be held  - repeat CT scan given ongoing abd pain since d/c, awaiting results of KUB  this am to assess for clearance of barium then hopefully CT to follow

## 2017-08-22 NOTE — PLAN OF CARE
Problem: Patient Care Overview (Adult)  Goal: Plan of Care Review  Outcome: Ongoing (interventions implemented as appropriate)    08/22/17 1558   Coping/Psychosocial Response Interventions   Plan Of Care Reviewed With patient   Outcome Evaluation   Outcome Summary/Follow up Plan Pt has met all PT goals at this time.          Problem: Inpatient Physical Therapy  Goal: Gait Training Goal LTG- PT  Outcome: Outcome(s) achieved Date Met:  08/22/17 08/22/17 1558   Gait Training PT LTG   Gait Training Goal PT LTG, Date Goal Reviewed 08/22/17   Gait Training Goal PT LTG, Outcome goal met

## 2017-08-23 ENCOUNTER — APPOINTMENT (OUTPATIENT)
Dept: GENERAL RADIOLOGY | Facility: HOSPITAL | Age: 62
End: 2017-08-23

## 2017-08-23 ENCOUNTER — APPOINTMENT (OUTPATIENT)
Dept: CT IMAGING | Facility: HOSPITAL | Age: 62
End: 2017-08-23
Attending: INTERNAL MEDICINE

## 2017-08-23 PROBLEM — B37.0 ORAL CANDIDIASIS: Status: RESOLVED | Noted: 2017-08-14 | Resolved: 2017-08-23

## 2017-08-23 PROBLEM — D64.9 ANEMIA: Status: ACTIVE | Noted: 2017-08-09

## 2017-08-23 PROBLEM — N17.9 AKI (ACUTE KIDNEY INJURY) (HCC): Status: RESOLVED | Noted: 2017-08-15 | Resolved: 2017-08-23

## 2017-08-23 PROBLEM — R07.2 PRECORDIAL PAIN: Status: RESOLVED | Noted: 2017-08-11 | Resolved: 2017-08-23

## 2017-08-23 LAB
ANION GAP SERPL CALCULATED.3IONS-SCNC: 8.6 MMOL/L
BUN BLD-MCNC: 14 MG/DL (ref 8–23)
BUN/CREAT SERPL: 14.6 (ref 7–25)
CALCIUM SPEC-SCNC: 8.9 MG/DL (ref 8.6–10.5)
CHLORIDE SERPL-SCNC: 98 MMOL/L (ref 98–107)
CO2 SERPL-SCNC: 34.4 MMOL/L (ref 22–29)
CREAT BLD-MCNC: 0.96 MG/DL (ref 0.57–1)
DEPRECATED RDW RBC AUTO: 48.8 FL (ref 37–54)
ERYTHROCYTE [DISTWIDTH] IN BLOOD BY AUTOMATED COUNT: 14.8 % (ref 11.7–13)
GFR SERPL CREATININE-BSD FRML MDRD: 59 ML/MIN/1.73
GLUCOSE BLD-MCNC: 128 MG/DL (ref 65–99)
HCT VFR BLD AUTO: 28.2 % (ref 35.6–45.5)
HGB BLD-MCNC: 8.5 G/DL (ref 11.9–15.5)
MCH RBC QN AUTO: 27.3 PG (ref 26.9–32)
MCHC RBC AUTO-ENTMCNC: 30.1 G/DL (ref 32.4–36.3)
MCV RBC AUTO: 90.7 FL (ref 80.5–98.2)
PLATELET # BLD AUTO: 278 10*3/MM3 (ref 140–500)
PMV BLD AUTO: 10.4 FL (ref 6–12)
POTASSIUM BLD-SCNC: 3.9 MMOL/L (ref 3.5–5.2)
RBC # BLD AUTO: 3.11 10*6/MM3 (ref 3.9–5.2)
SODIUM BLD-SCNC: 141 MMOL/L (ref 136–145)
WBC NRBC COR # BLD: 8.55 10*3/MM3 (ref 4.5–10.7)

## 2017-08-23 PROCEDURE — 85027 COMPLETE CBC AUTOMATED: CPT | Performed by: INTERNAL MEDICINE

## 2017-08-23 PROCEDURE — 25010000002 MORPHINE SULFATE (PF) 2 MG/ML SOLUTION: Performed by: INTERNAL MEDICINE

## 2017-08-23 PROCEDURE — 74000 HC ABDOMEN KUB: CPT

## 2017-08-23 PROCEDURE — 74177 CT ABD & PELVIS W/CONTRAST: CPT

## 2017-08-23 PROCEDURE — 94799 UNLISTED PULMONARY SVC/PX: CPT

## 2017-08-23 PROCEDURE — 99232 SBSQ HOSP IP/OBS MODERATE 35: CPT | Performed by: INTERNAL MEDICINE

## 2017-08-23 PROCEDURE — 80048 BASIC METABOLIC PNL TOTAL CA: CPT | Performed by: INTERNAL MEDICINE

## 2017-08-23 PROCEDURE — 99231 SBSQ HOSP IP/OBS SF/LOW 25: CPT | Performed by: INTERNAL MEDICINE

## 2017-08-23 PROCEDURE — 97110 THERAPEUTIC EXERCISES: CPT

## 2017-08-23 PROCEDURE — 25010000002 METHYLPREDNISOLONE PER 40 MG: Performed by: INTERNAL MEDICINE

## 2017-08-23 PROCEDURE — 0 IOPAMIDOL 61 % SOLUTION: Performed by: INTERNAL MEDICINE

## 2017-08-23 PROCEDURE — 25010000002 FUROSEMIDE PER 20 MG: Performed by: INTERNAL MEDICINE

## 2017-08-23 RX ORDER — FUROSEMIDE 40 MG/1
40 TABLET ORAL 2 TIMES DAILY
Status: DISCONTINUED | OUTPATIENT
Start: 2017-08-24 | End: 2017-08-25

## 2017-08-23 RX ORDER — PREDNISONE 20 MG/1
40 TABLET ORAL
Status: DISCONTINUED | OUTPATIENT
Start: 2017-08-24 | End: 2017-08-25 | Stop reason: HOSPADM

## 2017-08-23 RX ORDER — FUROSEMIDE 10 MG/ML
60 INJECTION INTRAMUSCULAR; INTRAVENOUS ONCE
Status: COMPLETED | OUTPATIENT
Start: 2017-08-23 | End: 2017-08-23

## 2017-08-23 RX ADMIN — MULTIPLE VITAMINS W/ MINERALS TAB 1 TABLET: TAB at 08:27

## 2017-08-23 RX ADMIN — NYSTATIN 500000 UNITS: 100000 SUSPENSION ORAL at 12:41

## 2017-08-23 RX ADMIN — BUDESONIDE AND FORMOTEROL FUMARATE DIHYDRATE 2 PUFF: 160; 4.5 AEROSOL RESPIRATORY (INHALATION) at 07:04

## 2017-08-23 RX ADMIN — FERROUS SULFATE TAB 325 MG (65 MG ELEMENTAL FE) 325 MG: 325 (65 FE) TAB at 17:09

## 2017-08-23 RX ADMIN — HYDROCODONE BITARTRATE AND ACETAMINOPHEN 1 TABLET: 5; 325 TABLET ORAL at 07:22

## 2017-08-23 RX ADMIN — PANTOPRAZOLE SODIUM 40 MG: 40 TABLET, DELAYED RELEASE ORAL at 07:20

## 2017-08-23 RX ADMIN — CYCLOBENZAPRINE HYDROCHLORIDE 10 MG: 10 TABLET, FILM COATED ORAL at 20:54

## 2017-08-23 RX ADMIN — ASPIRIN 81 MG: 81 TABLET ORAL at 08:27

## 2017-08-23 RX ADMIN — SERTRALINE 100 MG: 100 TABLET, FILM COATED ORAL at 08:28

## 2017-08-23 RX ADMIN — FUROSEMIDE 60 MG: 10 INJECTION, SOLUTION INTRAMUSCULAR; INTRAVENOUS at 15:35

## 2017-08-23 RX ADMIN — CETIRIZINE HYDROCHLORIDE 10 MG: 10 TABLET, FILM COATED ORAL at 08:27

## 2017-08-23 RX ADMIN — SUCRALFATE 1 G: 1 SUSPENSION ORAL at 17:09

## 2017-08-23 RX ADMIN — IOPAMIDOL 85 ML: 612 INJECTION, SOLUTION INTRAVENOUS at 12:18

## 2017-08-23 RX ADMIN — NYSTATIN 500000 UNITS: 100000 SUSPENSION ORAL at 08:28

## 2017-08-23 RX ADMIN — METHYLPREDNISOLONE SODIUM SUCCINATE 40 MG: 40 INJECTION, POWDER, FOR SOLUTION INTRAMUSCULAR; INTRAVENOUS at 08:31

## 2017-08-23 RX ADMIN — MORPHINE SULFATE 1 MG: 2 INJECTION, SOLUTION INTRAMUSCULAR; INTRAVENOUS at 04:02

## 2017-08-23 RX ADMIN — TEMAZEPAM 30 MG: 15 CAPSULE ORAL at 23:09

## 2017-08-23 RX ADMIN — SUCRALFATE 1 G: 1 SUSPENSION ORAL at 23:09

## 2017-08-23 RX ADMIN — HYDROCORTISONE ACETATE 25 MG: 25 SUPPOSITORY RECTAL at 08:28

## 2017-08-23 RX ADMIN — GUAIFENESIN 600 MG: 600 TABLET, EXTENDED RELEASE ORAL at 20:54

## 2017-08-23 RX ADMIN — CARVEDILOL 12.5 MG: 12.5 TABLET, FILM COATED ORAL at 17:08

## 2017-08-23 RX ADMIN — HYDROCODONE BITARTRATE AND ACETAMINOPHEN 1 TABLET: 5; 325 TABLET ORAL at 12:41

## 2017-08-23 RX ADMIN — ROPINIROLE 2 MG: 2 TABLET, FILM COATED ORAL at 20:54

## 2017-08-23 RX ADMIN — IPRATROPIUM BROMIDE AND ALBUTEROL SULFATE 3 ML: .5; 3 SOLUTION RESPIRATORY (INHALATION) at 07:04

## 2017-08-23 RX ADMIN — METHYLPREDNISOLONE SODIUM SUCCINATE 40 MG: 40 INJECTION, POWDER, FOR SOLUTION INTRAMUSCULAR; INTRAVENOUS at 20:54

## 2017-08-23 RX ADMIN — IPRATROPIUM BROMIDE AND ALBUTEROL SULFATE 3 ML: .5; 3 SOLUTION RESPIRATORY (INHALATION) at 03:37

## 2017-08-23 RX ADMIN — HYDROCODONE BITARTRATE AND ACETAMINOPHEN 1 TABLET: 5; 325 TABLET ORAL at 20:59

## 2017-08-23 RX ADMIN — SUCRALFATE 1 G: 1 SUSPENSION ORAL at 07:20

## 2017-08-23 RX ADMIN — IPRATROPIUM BROMIDE AND ALBUTEROL SULFATE 3 ML: .5; 3 SOLUTION RESPIRATORY (INHALATION) at 15:26

## 2017-08-23 RX ADMIN — NYSTATIN 500000 UNITS: 100000 SUSPENSION ORAL at 17:09

## 2017-08-23 RX ADMIN — ATORVASTATIN CALCIUM 40 MG: 20 TABLET, FILM COATED ORAL at 08:27

## 2017-08-23 RX ADMIN — GUAIFENESIN 600 MG: 600 TABLET, EXTENDED RELEASE ORAL at 08:27

## 2017-08-23 RX ADMIN — FUROSEMIDE 40 MG: 40 TABLET ORAL at 08:27

## 2017-08-23 RX ADMIN — HYDROCODONE BITARTRATE AND ACETAMINOPHEN 1 TABLET: 5; 325 TABLET ORAL at 17:09

## 2017-08-23 RX ADMIN — SUCRALFATE 1 G: 1 SUSPENSION ORAL at 12:41

## 2017-08-23 RX ADMIN — CARVEDILOL 12.5 MG: 12.5 TABLET, FILM COATED ORAL at 08:28

## 2017-08-23 RX ADMIN — BUDESONIDE AND FORMOTEROL FUMARATE DIHYDRATE 2 PUFF: 160; 4.5 AEROSOL RESPIRATORY (INHALATION) at 19:15

## 2017-08-23 RX ADMIN — IPRATROPIUM BROMIDE AND ALBUTEROL SULFATE 3 ML: .5; 3 SOLUTION RESPIRATORY (INHALATION) at 23:41

## 2017-08-23 RX ADMIN — FERROUS SULFATE TAB 325 MG (65 MG ELEMENTAL FE) 325 MG: 325 (65 FE) TAB at 08:28

## 2017-08-23 RX ADMIN — LISINOPRIL 5 MG: 5 TABLET ORAL at 08:27

## 2017-08-23 RX ADMIN — NYSTATIN 500000 UNITS: 100000 SUSPENSION ORAL at 20:55

## 2017-08-23 RX ADMIN — IPRATROPIUM BROMIDE AND ALBUTEROL SULFATE 3 ML: .5; 3 SOLUTION RESPIRATORY (INHALATION) at 19:10

## 2017-08-23 NOTE — PLAN OF CARE
Problem: Patient Care Overview (Adult)  Goal: Plan of Care Review  Outcome: Ongoing (interventions implemented as appropriate)    08/23/17 1331   Coping/Psychosocial Response Interventions   Plan Of Care Reviewed With patient   Patient Care Overview   Progress improving   Outcome Evaluation   Outcome Summary/Follow up Plan CT of abd and pelvis done today, continue IV steriods, lortab for pain.       Goal: Adult Individualization and Mutuality  Outcome: Ongoing (interventions implemented as appropriate)    Problem: Fall Risk (Adult)  Goal: Absence of Falls  Outcome: Ongoing (interventions implemented as appropriate)    Problem: Pain, Acute (Adult)  Goal: Acceptable Pain Control/Comfort Level  Outcome: Ongoing (interventions implemented as appropriate)    Problem: GI Endoscopy (Adult)  Goal: Signs and Symptoms of Listed Potential Problems Will be Absent or Manageable (GI Endoscopy)  Outcome: Ongoing (interventions implemented as appropriate)    Problem: Respiratory Insufficiency (Adult)  Goal: Acid/Base Balance  Outcome: Ongoing (interventions implemented as appropriate)  Goal: Effective Ventilation  Outcome: Ongoing (interventions implemented as appropriate)

## 2017-08-23 NOTE — PROGRESS NOTES
[facility]  Phone: [phone]  Guanako Crenshaw MD    Subjective:  LOS: 13    She continues to have some wheezing but is better.  She continues to have abdominal pain especially in the left lower quadrant and right upper quadrant. Denies n/v. Still pending CT.    Objective   Vital Signs past 24hrs  BP range: BP: (120-164)/(57-83) 120/57  Pulse range: Heart Rate:  [64-78] 64  Resp rate range: Resp:  [16-24] 18  Temp range: Temp (24hrs), Av.9 °F (36.6 °C), Min:97.2 °F (36.2 °C), Max:98.4 °F (36.9 °C)    O2 Device: nasal cannulaFlow (L/min):  [3] 3  Oxygen range:SpO2:  [88 %-100 %] 98 %   166 lb (75.3 kg); Body mass index is 26 kg/(m^2).    Intake/Output Summary (Last 24 hours) at 17  Last data filed at 17 1816   Gross per 24 hour   Intake              840 ml   Output              350 ml   Net              490 ml       Physical Exam   Eyes: Conjunctivae are normal.   Cardiovascular: Normal rate and regular rhythm.    No murmur heard.  Pulmonary/Chest: No respiratory distress. She has decreased breath sounds. She has wheezes (mild to moderate slightly better). She has no rales.   Abdominal: Soft. Bowel sounds are normal. She exhibits no mass. There is tenderness (LLQ and RUQ).   Musculoskeletal: She exhibits no edema.   Neurological: She is alert. No sensory deficit.   Skin: Skin is warm. No rash noted.     Results Review:    I have reviewed the laboratory and imaging data since the last note by [facility] physician.  My annotations are noted in assessment and plan.    Medication Review:  I have reviewed the current MAR.  My annotations are noted in assessment and plan.      sodium chloride 30 mL/hr Last Rate: 30 mL/hr (17 1059)     Plan   PCCM Problems  COPD exacerbation  Underlying severe COPD  Chronic hypoxic respiratory failure, on nasal cannula  Chronic hypercapnic respiratory failure  Lung nodules with demonstrated stability on previous CT  Relevant Medical Diagnoses  GI  bleed  Abdominal pain  Previous valve replacement  Chronic atrial fibrillation    Plan of Treatment  Continue IV Solu-Medrol 40 mg every 12 hours.  This can be discontinued on discharge and switched to oral prednisone. Check cxR also. It sounds like her wheezing is getting better.    Continued complaints of abdominal pain especially in the lower quadrants.  GI is following and will manage.    Her oxygen levels are adequate.  Needs consideration of Trilogy as an outpatient.  She will follow-up with Dr. Melo who is her regular pulmonologist.    Guanako Crenshaw MD  08/22/17  8:33 PM    Part of this note may be an electronic transcription/translation of spoken language to printed text using the Dragon Dictation System.

## 2017-08-23 NOTE — PROGRESS NOTES
[facility]  Phone: [phone]  Guanako Crenshaw MD    Subjective:  LOS: 14    Still abd pain. Substantially better with medrol and minimal wheezing.    Objective   Vital Signs past 24hrs  BP range: BP: (120-164)/(48-81) 160/81  Pulse range: Heart Rate:  [64-80] 80  Resp rate range: Resp:  [16-20] 16  Temp range: Temp (24hrs), Av.1 °F (36.7 °C), Min:97.2 °F (36.2 °C), Max:98.5 °F (36.9 °C)    O2 Device: nasal cannulaFlow (L/min):  [3] 3  Oxygen range:SpO2:  [97 %-100 %] 100 %   166 lb (75.3 kg); Body mass index is 26 kg/(m^2).    Intake/Output Summary (Last 24 hours) at 17 1419  Last data filed at 17 1333   Gross per 24 hour   Intake              960 ml   Output             1050 ml   Net              -90 ml       Physical Exam   Eyes: Conjunctivae are normal.   Cardiovascular: Normal rate and regular rhythm.    No murmur heard.  Pulmonary/Chest: No respiratory distress. She has decreased breath sounds. She has no wheezes. She has no rales.   Abdominal: Soft. Bowel sounds are normal. She exhibits no mass. There is tenderness.   Musculoskeletal: She exhibits no edema.   Neurological: She is alert. No sensory deficit.   Skin: Skin is warm. No rash noted.     Results Review:    I have reviewed the laboratory and imaging data since the last note by [facility] physician.  My annotations are noted in assessment and plan.    Medication Review:  I have reviewed the current MAR.  My annotations are noted in assessment and plan.      sodium chloride 30 mL/hr Last Rate: 30 mL/hr (17 1059)     Plan   PCCM Problems  COPD exacerbation  Underlying severe COPD  Chronic hypoxic respiratory failure, on nasal cannula  Chronic hypercapnic respiratory failure  Lung nodules with demonstrated stability on previous CT  Relevant Medical Diagnoses  GI bleed  Abdominal pain  Previous valve replacement  Chronic atrial fibrillation    Plan of Treatment  CxR reviewed yesterday. No acute changes.    Switch to po pred  tomorrow, then taper.    Continued complaints of abdominal pain especially in the lower quadrants.  GI is following and will manage.    Her oxygen levels are adequate.  Needs consideration of Trilogy as an outpatient.  She will follow-up with Dr. Melo who is her regular pulmonologist.    Guanako Crenshaw MD  08/23/17  2:19 PM    Part of this note may be an electronic transcription/translation of spoken language to printed text using the Dragon Dictation System.

## 2017-08-23 NOTE — THERAPY TREATMENT NOTE
Acute Care - Physical Therapy Treatment Note  [facility]     Patient Name: Paz Browne  : 1955  MRN: [id number]  Today's Date: 2017             Admit Date: 2017    Visit Dx:    ICD-10-CM ICD-9-CM   1. Lower GI bleed K92.2 578.9   2. Dysplastic polyp of colon K63.5 211.3   3. Abnormal esophagram R93.3 793.4   4. Anemia, unspecified type D64.9 285.9   5. Difficulty walking R26.2 719.7   6. Chronic respiratory failure with hypoxia J96.11 518.83     799.02   7. Primary osteoarthritis involving multiple joints M15.0 715.09     Patient Active Problem List   Diagnosis   • PAF (paroxysmal atrial fibrillation)   • Hypertension   • Hyperlipidemia   • COPD (chronic obstructive pulmonary disease)   • Pain medication agreement   • Hiatal hernia   • Primary osteoarthritis involving multiple joints   • Pulmonary hypertension   • S/P TVR (tricuspid valve repair)   • Long term current use of anticoagulant   • Pulmonary nodule   • Hemoptysis   • RLS (restless legs syndrome)   • Chronic low back pain   • Dysplastic polyp of colon   • Lower GI bleed   • History of mitral valve replacement with tissue graft   • Chronic respiratory failure with hypoxia   • Anemia               Adult Rehabilitation Note       17 1650 17 1540 17 1022    Rehab Assessment/Intervention    Discipline physical therapy assistant  -MANAS physical therapy assistant  -HAIDER physical therapy assistant  -HAIDER    Document Type therapy note (daily note)  -MANAS therapy note (daily note)  -HAIDER therapy note (daily note)  -HAIDER    Subjective Information agree to therapy;complains of;weakness;fatigue  -MANAS agree to therapy  -HAIDER agree to therapy  -HAIDER    Patient Effort, Rehab Treatment  good  -HAIDER good  -HAIDER    Precautions/Limitations fall precautions;oxygen therapy device and L/min   4L  -MANAS fall precautions;oxygen therapy device and L/min  -HAIDER fall precautions;oxygen therapy device and L/min  -HAIDER    Recorded by [MANAS] Trish Newby PTA [HAIDER]  Jaleel Sheppard PTA [HAIDER] Jaleel Sheppard PTA    Vital Signs    Pre SpO2 (%)   96  -HAIDER    O2 Delivery Pre Treatment   supplemental O2  -HAIDER    Post SpO2 (%)   94  -HAIDER    O2 Delivery Post Treatment   supplemental O2  -HAIDER    Recorded by   [HAIDER] Jaleel Sheppard PTA    Pain Assessment    Pain Assessment No/denies pain  -JM No/denies pain  -HAIDER No/denies pain  -HAIDER    Recorded by [JM] Trish Newby PTA [HAIDER] Jaleel Sheppard PTA [HAIDER] Jaleel Sheppard PTA    Cognitive Assessment/Intervention    Current Cognitive/Communication Assessment  functional  -HAIDER functional  -HAIDER    Orientation Status  oriented x 4  -HAIDER oriented x 4  -HAIDER    Follows Commands/Answers Questions  100% of the time  -HAIDER 100% of the time  -HAIDER    Personal Safety  WNL/WFL  -HAIDER WNL/WFL  -HAIDER    Personal Safety Interventions  fall prevention program maintained;gait belt;nonskid shoes/slippers when out of bed  -HAIDER fall prevention program maintained;gait belt;nonskid shoes/slippers when out of bed  -HAIDER    Recorded by  [HAIDER] Jaleel Sheppard PTA [HAIDER] Jaleel Sheppard PTA    Bed Mobility, Assessment/Treatment    Bed Mobility, Assistive Device bed rails;head of bed elevated  -JM bed rails;head of bed elevated  -HAIDER bed rails;head of bed elevated  -HAIDER    Bed Mob, Supine to Sit, [place] contact guard assist;supervision required;verbal cues required  -JM conditional independence  -HAIDER supervision required  -HAIDER    Bed Mob, Sit to Supine, [place] supervision required  -JM conditional independence  -HAIDER supervision required  -HAIDER    Bed Mobility, Impairments strength decreased  -JM strength decreased  -HAIDER strength decreased  -HAIDER    Recorded by [JM] Trish Newby PTA [HAIDER] Jaleel Sheppard PTA [HAIDER] Jaleel Sheppard PTA    Transfer Assessment/Treatment    Transfers, Sit-Stand [place] contact guard assist;verbal cues required  -JM conditional independence  -HAIDER supervision required  -HAIDER    Transfers, Stand-Sit [place] stand by assist  -JM conditional independence  -HAIDER supervision required  -HAIDER     Transfers, Sit-Stand-Sit, Assist Device rolling walker  -JM rolling walker  -HAIDER rolling walker  -HAIDER    Toilet Transfer, [place] supervision required;verbal cues required  -JM      Toilet Transfer, Assistive Device rolling walker  -JM      Transfer, Impairments   strength decreased;impaired balance  -HAIDER    Recorded by [JM] Trish Newby PTA [HAIDER] Jaleel Sheppard PTA [HAIDER] Jaleel Sheppard PTA    Gait Assessment/Treatment    Gait, [place] Level contact guard assist;verbal cues required  -JM conditional independence  -HAIDER contact guard assist;verbal cues required  -HAIDER    Gait, Assistive Device rolling walker  -JM rolling walker  -HAIDER rolling walker  -HAIDER    Gait, Distance (Feet) 150  -  -HAIDER 150   No rest break  -HAIDER    Gait, Gait Deviations step length decreased  -JM kassidy decreased;forward flexed posture;limb motion velocity decreased;step length decreased  -HAIDER kassidy decreased;forward flexed posture;limb motion velocity decreased;step length decreased  -HAIDER    Gait, Safety Issues supplemental O2;step length decreased  -JM supplemental O2;weight-shifting ability decreased  -HAIDER step length decreased;weight-shifting ability decreased;supplemental O2  -HAIDER    Gait, Impairments strength decreased  -JM strength decreased  -HAIDER strength decreased;impaired balance  -HAIDER    Gait, Comment slight assist to guide rwx  -JM      Recorded by [JM] Trish Newby PTA [HAIDER] Jaleel Sheppard PTA [HAIDER] Jaleel Sheppard PTA    Therapy Exercises    Bilateral Lower Extremities  AROM:;10 reps;ankle pumps/circles;LAQ;hip flexion  -HAIDER AROM:;10 reps;ankle pumps/circles;LAQ;hip flexion  -HAIDER    Recorded by  [HAIDER] Jaleel Sheppard PTA [HAIDER] Jaleel Sheppard PTA    Positioning and Restraints    Pre-Treatment Position in bed  -JM in bed  -HAIDER in bed  -HAIDER    Post Treatment Position  bed  -HAIDER chair  -HAIDER    In Bed supine;call light within reach;encouraged to call for assist;with family/caregiver   no alarm upon entry  -JM sitting EOB;call light within  reach;encouraged to call for assist;with nsg  -HAIDER     In Chair   sitting;call light within reach;encouraged to call for assist;notified nsg;with other staff   w/ MD BAKER    Recorded by [MANAS] Trish Newby PTA [HAIDER] Jaleel Sheppard PTA [HAIDER] Jaleel Sheppard PTA      User Key  (r) = Recorded By, (t) = Taken By, (c) = Cosigned By    Initials Name Effective Dates    MANAS Newby PTA 02/18/16 -     HAIDER Jaleel Sheppard PTA 04/24/15 -                 IP PT Goals       08/22/17 1558 08/19/17 0953       Bed Mobility PT LTG    Bed Mobility PT LTG, Date Established  08/19/17  -DO     Bed Mobility PT LTG, Time to Achieve  1 wk  -DO     Bed Mobility PT LTG, Activity Type  all bed mobility  -DO     Bed Mobility PT LTG, [place] Level  independent  -DO     Transfer Training PT LTG    Transfer Training PT LTG, Date Established  08/19/17  -DO     Transfer Training PT LTG, Time to Achieve  1 wk  -DO     Transfer Training PT LTG, Activity Type  all transfers  -DO     Transfer Training PT LTG, [place] Level  conditional independence  -DO     Transfer Training PT LTG, Assist Device  walker, rolling  -DO     Gait Training PT LTG    Gait Training Goal PT LTG, Date Established  08/19/17  -DO     Gait Training Goal PT LTG, Time to Achieve  1 wk  -DO     Gait Training Goal PT LTG, [place] Level  conditional independence  -DO     Gait Training Goal PT LTG, Assist Device  walker, rolling  -DO     Gait Training Goal PT LTG, Distance to Achieve  150 ft  -DO     Gait Training Goal PT LTG, Date Goal Reviewed 08/22/17  -HAIDER      Gait Training Goal PT LTG, Outcome goal met  -HAIDER        User Key  (r) = Recorded By, (t) = Taken By, (c) = Cosigned By    Initials Name Provider Type    DO Jules Parra, PT Physical Therapist    HAIDER Sheppard PTA Physical Therapy Assistant          Physical Therapy Education     Title: PT OT SLP Therapies (Done)     Topic: Physical Therapy (Done)     Point: Mobility training (Done)    Learning Progress Summary     Learner Readiness Method Response Comment Documented by Status   Patient Eager E,TB,D SUDHAKAR MALAGON 08/23/17 1706 Done    Acceptance E VU  HAIDER 08/22/17 1558 Done    Acceptance E VU  HAIDER 08/21/17 1042 Done    Eager E DU mobility and safety SI 08/20/17 1150 Done    Acceptance E,D VU,DU  DO 08/19/17 0953 Done   Family Eager E,TB,EFFIE MALAGON 08/23/17 1706 Done               Point: Home exercise program (Done)    Learning Progress Summary    Learner Readiness Method Response Comment Documented by Status   Patient Eager E,TB,D SUDHAKAR MALAGON 08/23/17 1706 Done    Acceptance E VU  HAIDER 08/22/17 1558 Done    Acceptance E VU  HAIDER 08/21/17 1042 Done    Eager E DU mobility and safety SI 08/20/17 1150 Done    Acceptance E,D VU,DU  DO 08/19/17 0953 Done   Family Eager E,TB,EFFIE MALAGON 08/23/17 1706 Done               Point: Body mechanics (Done)    Learning Progress Summary    Learner Readiness Method Response Comment Documented by Status   Patient Eager E,TB,D SUDHAKAR MALAGON 08/23/17 1706 Done    Acceptance E VU  HAIDER 08/22/17 1558 Done    Acceptance E VU  HAIDER 08/21/17 1042 Done    Eager E DU mobility and safety SI 08/20/17 1150 Done    Acceptance E,D VU,DU  DO 08/19/17 0953 Done   Family Eager E,TB,EFFIE [place]   08/23/17 1706 Done               Point: Precautions (Done)    Learning Progress Summary    Learner Readiness Method Response Comment Documented by Status   Patient Eager E,TB,D SUDHAKAR   08/23/17 1706 Done    Acceptance E VU  HAIDER 08/22/17 1558 Done    Acceptance E VU  HAIDER 08/21/17 1042 Done    Eager E DU mobility and safety SI 08/20/17 1150 Done    Acceptance E,D VU,DU  DO 08/19/17 0953 Done   Family Eager E,TBEFFIE   08/23/17 1706 Done                      User Key     Initials Effective Dates Name Provider Type Discipline    SI 05/18/15 -  Amrita Loredo, PTA Physical Therapy Assistant PT    JM 02/18/16 -  Trish Newby, PTA Physical Therapy Assistant PT    DO 01/27/16 -  Jules aPrra, PT Physical Therapist PT    HAIDER 04/24/15 -  Jaleel Sheppard, PTA Physical  Therapy Assistant PT                    PT Recommendation and Plan  Anticipated Equipment Needs At Discharge: front wheeled walker  Anticipated Discharge Disposition: home with assist, home with home health  Planned Therapy Interventions: bed mobility training, gait training, strengthening, transfer training  PT Frequency: daily  Plan of Care Review  Plan Of Care Reviewed With: patient, daughter  Progress: progress toward functional goals as expected  Outcome Summary/Follow up Plan: no c/o dizziness w/amb today, SATS remained 94-95% after amb on 4L          Outcome Measures       08/23/17 1700 08/22/17 1500 08/21/17 1000    How much help from another person do you currently need...    Turning from your back to your side while in flat bed without using bedrails? 4  -JM 4  -HAIDER 4  -HAIDER    Moving from lying on back to sitting on the side of a flat bed without bedrails? 3  -JM 4  -HAIDER 4  -HAIDER    Moving to and from a bed to a chair (including a wheelchair)? 3  -JM 4  -HAIDER 3  -HAIDER    Standing up from a chair using your arms (e.g., wheelchair, bedside chair)? 3  -JM 4  -HAIDER 3  -HAIDER    Climbing 3-5 steps with a railing? 3  -JM 3  -HAIDER 3  -HAIDER    To walk in hospital room? 3  -JM 4  -HAIDER 3  -HAIDER    AM-PAC 6 Clicks Score 19  -JM 23  -HAIDER 20  -HAIDER    Functional Assessment    Outcome Measure Options  AM-PAC 6 Clicks Basic Mobility (PT)  -HAIDER AM-PAC 6 Clicks Basic Mobility (PT)  -HAIDER      User Key  (r) = Recorded By, (t) = Taken By, (c) = Cosigned By    Initials Name Provider Type    MANAS Newby PTA Physical Therapy Assistant    HAIDER Sheppard PTA Physical Therapy Assistant           Time Calculation:         PT Charges       08/23/17 1707 08/23/17 1035       Time Calculation    Start Time 1630  -      Stop Time 1648  -      Time Calculation (min) 18 min  -MANAS      PT Received On 08/23/17  -MANAS      PT - Next Appointment 08/24/17  -MANAS 08/23/17  -MANAS       User Key  (r) = Recorded By, (t) = Taken By, (c) = Cosigned By    Initials Name  Provider Type     Trish Newby PTA Physical Therapy Assistant          Therapy Charges for Today     Code Description Service Date Service Provider Modifiers Qty    44716732711 HC PT THER PROC EA 15 MIN 8/23/2017 Trish Newby PTA GP 1          PT G-Codes  Outcome Measure Options: AM-PAC 6 Clicks Basic Mobility (PT)    Trish Newby PTA  8/23/2017

## 2017-08-23 NOTE — SIGNIFICANT NOTE
08/23/17 1035   Rehab Treatment   Discipline physical therapy assistant   Treatment Not Performed patient unavailable for treatment  (gone to xray-check in pm)   Recommendation   PT - Next Appointment 08/23/17

## 2017-08-23 NOTE — PLAN OF CARE
Problem: Patient Care Overview (Adult)  Goal: Plan of Care Review  Outcome: Ongoing (interventions implemented as appropriate)    08/23/17 1706   Coping/Psychosocial Response Interventions   Plan Of Care Reviewed With patient;daughter   Patient Care Overview   Progress progress toward functional goals as expected   Outcome Evaluation   Outcome Summary/Follow up Plan no c/o dizziness w/amb today, SATS remained 94-95% after amb on 4L

## 2017-08-23 NOTE — PLAN OF CARE
Problem: Patient Care Overview (Adult)  Goal: Plan of Care Review  Outcome: Ongoing (interventions implemented as appropriate)    08/23/17 0705   Coping/Psychosocial Response Interventions   Plan Of Care Reviewed With patient   Patient Care Overview   Progress improving   Outcome Evaluation   Outcome Summary/Follow up Plan lungs sounds better pt breathing better and sating better still with left side abd discomfort still taking pain meds with relief         Problem: GI Endoscopy (Adult)  Goal: Signs and Symptoms of Listed Potential Problems Will be Absent or Manageable (GI Endoscopy)  Outcome: Ongoing (interventions implemented as appropriate)

## 2017-08-23 NOTE — PROGRESS NOTES
"     LOS: 14 days   Primary Care Physician: Javan Martinez MD     Subjective   Breathing is better though still a little tight.  Lower quadrants still sore, increases with coughing and moving.  Not affected by eating    Vital Signs  Body mass index is 26 kg/(m^2).  Temp:  [97.2 °F (36.2 °C)-98.5 °F (36.9 °C)] 98.4 °F (36.9 °C)  Heart Rate:  [64-80] 64  Resp:  [16-20] 20  BP: (120-160)/(48-81) 148/73    On 3 and 1/2 L O2 right now    Objective:  General Appearance:  In no acute distress (Looks older than age.  Morbidly obese).    Vital signs: (most recent): Blood pressure 148/73, pulse 64, temperature 98.4 °F (36.9 °C), temperature source Oral, resp. rate 20, height 67\" (170.2 cm), weight 166 lb (75.3 kg), SpO2 97 %.    Lungs:  There are decreased breath sounds.  No wheezes, rales or rhonchi.    Heart: Normal rate.  Regular rhythm.  No murmur.   Abdomen: Abdomen is soft and non-distended.  Bowel sounds are normal.   There is right lower quadrant and left lower quadrant tenderness. (Very mild tenderness at the lower quadrants).     Extremities: There is dependent edema.  (Trace to 1+)  Neurological: Patient is alert.          Results Review:    I reviewed the patient's new clinical results.      Results from last 7 days  Lab Units 08/23/17  0530 08/22/17  0445   WBC 10*3/mm3 8.55 9.91   HEMOGLOBIN g/dL 8.5* 8.4*   PLATELETS 10*3/mm3 278 273       Results from last 7 days  Lab Units 08/23/17  0530 08/22/17  0445   SODIUM mmol/L 141 140   POTASSIUM mmol/L 3.9 4.2   CHLORIDE mmol/L 98 98   CO2 mmol/L 34.4* 31.1*   BUN mg/dL 14 12   CREATININE mg/dL 0.96 0.90   CALCIUM mg/dL 8.9 9.0   GLUCOSE mg/dL 128* 129*       Results from last 7 days  Lab Units 08/21/17  0451   INR  1.84*     Hemoglobin A1C:  Lab Results   Component Value Date    HGBA1C 5.7 (H) 09/01/2014       Glucose Range:No results found for: POCGLU    Lab Results   Component Value Date    KKHUWMUN63 892 08/13/2017       Lab Results   Component Value Date    " TSH 3.780 08/10/2017       Assessment & Plan      Medication Review: Yes    Active Hospital Problems (** Indicates Principal Problem)    Diagnosis Date Noted   • **Anemia [D64.9] 08/09/2017   • Chronic respiratory failure with hypoxia [J96.11] 08/13/2017   • History of mitral valve replacement with tissue graft [Z95.4] 08/11/2017   • Lower GI bleed [K92.2] 08/09/2017   • Long term current use of anticoagulant [Z79.01] 10/13/2016   • Pulmonary hypertension [I27.2]    • Hypertension [I10]    • COPD (chronic obstructive pulmonary disease) [J44.9]    • PAF (paroxysmal atrial fibrillation) [I48.0] 01/25/2016      Resolved Hospital Problems    Diagnosis Date Noted Date Resolved   • VAN (acute kidney injury) [N17.9] 08/15/2017 08/23/2017   • Oral candidiasis [B37.0] 08/14/2017 08/23/2017   • Precordial pain [R07.2] 08/11/2017 08/23/2017       Assessment/Plan  1.  Anemia with GI bleeding.  Hemoglobin stable but she does have some evidence of volume excess.  CT abdomen noted.  Mild anasarca.  Await further input from GI.  Coumadin has been stopped.  Will continue on aspirin.  Recheck hemoglobin in a.m.  2.  COPD exacerbation with acute and chronic respiratory failure and hypoxemia.  On 3 L of oxygen at home.  Here she is on 3.5 at rest and requires more with activity.  Change steroids to po tomorrow per pulmonary  3.  Acute and chronic diastolic congestive heart failure with pulmonary hypertension.  One dose of IV Lasix today and then continue with her oral Lasix tomorrow.  She doesn't typically have lower extremity edema she says.     Dominique Ponce MD  08/23/17  3:57 PM

## 2017-08-23 NOTE — PROGRESS NOTES
[facility]  Inpatient Progress Note    Reason for Follow Up:  Abdominal pain, GI bleeding    Subjective     Interval History:   No overt GI bleeding. Continue to complain of abdominal pain periumbilical and LLQ. Pain is worse with coughing and movement. CT a/p ordered for today. Tolerating regular diet.       Current Facility-Administered Medications:   •  acetaminophen (TYLENOL) tablet 650 mg, 650 mg, Oral, Q6H PRN, Tata Newby MD  •  albuterol (PROVENTIL) nebulizer solution 0.083% 2.5 mg/3mL, 2.5 mg, Nebulization, Q4H PRN, Nick Tovar MD, 2.5 mg at 08/19/17 0410  •  aspirin EC tablet 81 mg, 81 mg, Oral, Daily, Earnest Gan MD, 81 mg at 08/23/17 0827  •  atorvastatin (LIPITOR) tablet 40 mg, 40 mg, Oral, Daily, Tata Newby MD, 40 mg at 08/23/17 0827  •  bisacodyl (DULCOLAX) suppository 10 mg, 10 mg, Rectal, Daily PRN, Tata Newby MD  •  budesonide-formoterol (SYMBICORT) 160-4.5 MCG/ACT inhaler 2 puff, 2 puff, Inhalation, BID - RT, Tata Newby MD, 2 puff at 08/23/17 0704  •  calcium carbonate (TUMS) chewable tablet 500 mg (200 mg elemental), 1 tablet, Oral, BID PRN, Tata Newby MD  •  carvedilol (COREG) tablet 12.5 mg, 12.5 mg, Oral, BID With Meals, Kirti Arteaga MD, 12.5 mg at 08/23/17 0828  •  cetirizine (zyrTEC) tablet 10 mg, 10 mg, Oral, Daily, Tata Newby MD, 10 mg at 08/23/17 0827  •  cyclobenzaprine (FLEXERIL) tablet 10 mg, 10 mg, Oral, Nightly, Tata Newby MD, 10 mg at 08/22/17 2209  •  ferrous sulfate tablet 325 mg, 325 mg, Oral, BID With Meals, Cleveland Devine MD, 325 mg at 08/23/17 0828  •  furosemide (LASIX) tablet 40 mg, 40 mg, Oral, BID, Earnest Gan MD, 40 mg at 08/23/17 0827  •  guaiFENesin (MUCINEX) 12 hr tablet 600 mg, 600 mg, Oral, Q12H, Nick Tovar MD, 600 mg at 08/23/17 0827  •  HYDROcodone-acetaminophen (NORCO) 5-325 MG per tablet 1 tablet, 1 tablet, Oral, Q4H PRN,  Nick Tovar MD, 1 tablet at 17 0722  •  hydrocortisone (ANUSOL-HC) suppository 25 mg, 25 mg, Rectal, BID, Earnest PALOMO MD, 25 mg at 17 0828  •  hyoscyamine (LEVSIN) SL tablet 125 mcg, 125 mcg, Sublingual, Q4H PRN, Montserrat Newby, APRN, 125 mcg at 17 1107  •  ipratropium-albuterol (DUO-NEB) nebulizer solution 3 mL, 3 mL, Nebulization, Q4H - RT, Guanako Crenshaw MD, 3 mL at 17 0704  •  lisinopril (PRINIVIL,ZESTRIL) tablet 5 mg, 5 mg, Oral, Q24H, Kirti Arteaga MD, 5 mg at 17 0827  •  magnesium hydroxide (MILK OF MAGNESIA) suspension 2400 mg/10mL 10 mL, 10 mL, Oral, Daily PRN, Tata Newby MD  •  meclizine (ANTIVERT) tablet 25 mg, 25 mg, Oral, TID PRN, Tata Newby MD  •  methylPREDNISolone sodium succinate (SOLU-Medrol) injection 40 mg, 40 mg, Intravenous, Q12H, Nick Tovar MD, 40 mg at 17 0831  •  Morphine sulfate (PF) injection 1 mg, 1 mg, Intravenous, Q4H PRN, Nick Tovar MD, 1 mg at 17 0402  •  multivitamin with minerals 1 tablet, 1 tablet, Oral, Daily, Tata Newby MD, 1 tablet at 17 0827  •  [] Morphine sulfate (PF) injection 2 mg, 2 mg, Intravenous, Q4H PRN, 2 mg at 17 1459 **AND** naloxone (NARCAN) injection 0.4 mg, 0.4 mg, Intravenous, Q5 Min PRN, Tata Newby MD  •  nystatin (MYCOSTATIN) 134985 UNIT/ML suspension 500,000 Units, 5 mL, Oral, 4x Daily, Fam Sanderson MD, 500,000 Units at 17 0828  •  ondansetron (ZOFRAN) injection 4 mg, 4 mg, Intravenous, Q6H PRN, Tata Newby MD, 4 mg at 17 223  •  pantoprazole (PROTONIX) EC tablet 40 mg, 40 mg, Oral, QAM, Tata Newby MD, 40 mg at 17 0720  •  polyethylene glycol (MIRALAX) powder 17 g, 17 g, Oral, BID, Montserrat Newby, APRN  •  potassium chloride (MICRO-K) CR capsule 40 mEq, 40 mEq, Oral, PRN, 40 mEq at 17 4559 **OR** potassium chloride (KLOR-CON) packet 40 mEq, 40 mEq,  Oral, PRN **OR** potassium chloride 10 mEq in 100 mL IVPB, 10 mEq, Intravenous, Q1H PRN, Nick Tovar MD  •  rOPINIRole (REQUIP) tablet 2 mg, 2 mg, Oral, Nightly, Tata Newby MD, 2 mg at 08/22/17 2209  •  sertraline (ZOLOFT) tablet 100 mg, 100 mg, Oral, Daily, Tata Newby MD, 100 mg at 08/23/17 0828  •  sodium chloride (OCEAN) nasal spray 2 spray, 2 spray, Each Nare, PRN, Leonel Ruiz MD  •  sodium chloride 0.9 % flush 1-10 mL, 1-10 mL, Intravenous, PRN, Tata Newby MD  •  sodium chloride 0.9 % flush 10 mL, 10 mL, Intravenous, PRN, Amrit Reinoso MD  •  sodium chloride 0.9 % infusion, 30 mL/hr, Intravenous, Continuous PRN, Porsha Ruby MD, Last Rate: 30 mL/hr at 08/17/17 1059, 30 mL/hr at 08/17/17 1059  •  sucralfate (CARAFATE) 1 GM/10ML suspension 1 g, 1 g, Oral, Q6H, Porsha Ruby MD, 1 g at 08/23/17 0720  •  temazepam (RESTORIL) capsule 30 mg, 30 mg, Oral, Nightly PRN, Ttaa Newby MD, 30 mg at 08/22/17 2210  Review of Systems:    She has lower abdominal cramping with movement, all other systems reviewed and negative    Objective     Vital Signs  Temp:  [97.2 °F (36.2 °C)-98.5 °F (36.9 °C)] 98.4 °F (36.9 °C)  Heart Rate:  [64-80] 80  Resp:  [16-20] 16  BP: (120-164)/(48-81) 160/81  Body mass index is 26 kg/(m^2).    Intake/Output Summary (Last 24 hours) at 08/23/17 0923  Last data filed at 08/23/17 0833   Gross per 24 hour   Intake             1080 ml   Output             1050 ml   Net               30 ml     I/O this shift:  In: 240 [P.O.:240]  Out: -      Physical Exam:   General: patient awake, alert and cooperative   Eyes: Normal lids and lashes, no scleral icterus   Neck: supple, normal ROM   Skin: warm and dry, not jaundiced   Cardiovascular: regular rhythm and rate, no murmurs auscultated   Pulm: clear to auscultation bilaterally, regular and unlabored   Abdomen: soft, tender LLQ and periumbilical, nondistended; normal bowel  sounds   Rectal: deferred   Extremities: no rash or edema   Psychiatric: Normal mood and behavior; memory intact     Results Review:     I reviewed the patient's new clinical results.      Results from last 7 days  Lab Units 08/23/17 0530 08/22/17 0445 08/21/17 0451   WBC 10*3/mm3 8.55 9.91 10.28   HEMOGLOBIN g/dL 8.5* 8.4* 9.0*   HEMATOCRIT % 28.2* 28.2* 29.6*   PLATELETS 10*3/mm3 278 273 276       Results from last 7 days  Lab Units 08/23/17  0530 08/22/17 0445 08/21/17 0451 08/19/17  0440  08/17/17  0743 08/16/17  1201   SODIUM mmol/L 141 140 143  < > 140  < > 142  --    POTASSIUM mmol/L 3.9 4.2 3.2*  < > 3.5  < > 3.8  --    CHLORIDE mmol/L 98 98 98  < > 98  < > 102  --    CO2 mmol/L 34.4* 31.1* 32.2*  < > 26.9  < > 28.0  --    BUN mg/dL 14 12 12  < > 16  < > 23  --    CREATININE mg/dL 0.96 0.90 0.88  < > 0.90  < > 1.00  --    CALCIUM mg/dL 8.9 9.0 9.0  < > 9.3  < > 8.7  --    BILIRUBIN mg/dL  --   --   --   --  0.7  --  0.7 0.7   ALK PHOS U/L  --   --   --   --  99  --  98 79   ALT (SGPT) U/L  --   --   --   --  46*  --  44* 25   AST (SGOT) U/L  --   --   --   --  39*  --  61* 32   GLUCOSE mg/dL 128* 129* 139*  < > 111*  < > 107*  --    < > = values in this interval not displayed.    Results from last 7 days  Lab Units 08/21/17 0451 08/20/17 0431 08/19/17 0440   INR  1.84* 2.08* 2.22*       Lab Results  Lab Value Date/Time   LIPASE 46 08/13/2017 0449       Radiology:  XR Abdomen KUB   Final Result      XR Chest 2 View   Final Result   There are chronic parenchymal changes in the lungs. There   has been sternotomy. The cardiac silhouette is enlarged and the   pulmonary vasculature appears more prominent than on some of the recent   prior exams, suggesting there may be an element of heart failure or   volume overload. However, there is no pulmonary edema nor is there any   infiltrate.       This report was finalized on 8/21/2017 8:53 PM by Dr. Akbar Kumar MD.          XR Abdomen KUB   Final Result    A substantial volume of contrast material remains within the   colon.       This report was finalized on 8/21/2017 8:53 PM by Dr. Akbar Kumar MD.          XR Abdomen KUB   Final Result      XR Chest PA & Lateral   Final Result      FL Upper GI With Air Contrast & SBFT   Final Result      XR Chest PA & Lateral   Final Result      CT Abdomen Pelvis Without Contrast   Final Result   1. No evidence for acute intra-abdominal process.   2. Basilar pulmonary emphysema.   3. Hiatal hernia. Atherosclerotic disease. Previous cholecystectomy and   hysterectomy.       This report was finalized on 8/9/2017 3:26 PM by Dr. Magdi Greer MD.          CT Abdomen Pelvis With Contrast    (Results Pending)       Assessment/Plan     Patient Active Problem List   Diagnosis   • PAF (paroxysmal atrial fibrillation)   • Hypertension   • Hyperlipidemia   • COPD (chronic obstructive pulmonary disease)   • Pain medication agreement   • Hiatal hernia   • Primary osteoarthritis involving multiple joints   • Pulmonary hypertension   • S/P TVR (tricuspid valve repair)   • Long term current use of anticoagulant   • Pulmonary nodule   • Hemoptysis   • RLS (restless legs syndrome)   • Chronic low back pain   • Dysplastic polyp of colon   • Lower GI bleed   • History of mitral valve replacement with tissue graft   • Precordial pain   • Chronic respiratory failure with hypoxia   • Oral candidiasis   • VAN (acute kidney injury)   • Anemia       Montserrat Newby, BA    PE - VS seen  Lungs - CTA  CV - RRR  ABD - mild periumbilical discomfort.  EXT - No CCE    Impression:  #1 Lower GI bleeding: resolved. Hgb stable in mid 8 range. Onset in the postprocedural setting s/p colonoscopy with bleeding internal hemorrhoids. Congested mucosa in transverse colon and clips placed on 8/4/17  #2 anticoagulant therapy: Coumadin stopped 8/15 per cards due to continued drop in Hgb, OK with GI for asa with plans to remain off AC  #3 Abdominal discomfort-  SBFT with no obstructive process, evidence of esophagitis and mild stenosis. EGD 8/17/17 with gastritis.  Still with c/o abdominal discomfort, not related to po intake but appears unchanged over last several days.  + BM's overnight  #4 ANNA- oral iron therapy initiated. H&H stable      Plan:  - continue PPI and carafate  - Await results of CT abd/pelvis done today.    C[place] Kruse M.D.

## 2017-08-23 NOTE — PROGRESS NOTES
"CC: PAF/MVR    Interval History:   Feels better today, breathing better    Vital Signs  Temp:  [97.2 °F (36.2 °C)-98.5 °F (36.9 °C)] 98.5 °F (36.9 °C)  Heart Rate:  [64-79] 79  Resp:  [16-20] 16  BP: (120-164)/(48-77) 130/48    Intake/Output Summary (Last 24 hours) at 08/23/17 0735  Last data filed at 08/22/17 2304   Gross per 24 hour   Intake              840 ml   Output             1050 ml   Net             -210 ml     Flowsheet Rows         First Filed Value    Admission Height  67\" (170.2 cm) Documented at 08/09/2017 1152    Admission Weight  166 lb (75.3 kg) Documented at 08/09/2017 1152          PHYSICAL EXAM:  General: No acute distress  Resp:NL Rate, unlabored, Diffuse coarse rales scattered   wheezing   CV:NL rate and rhythm, NL PMI, Nl S1 and S2, 2/6 systolic [place], no gallop, no rub, No JVD. Normal pedal pulses  ABD:Nl sounds, no masses or tenderness, nondistended, no quarding or rebound  Neuro: alert,cooperative and oriented  Extr: 1+ bilateral tibial edema or no cyanosis, moves all extremities      Results Review:      Results from last 7 days  Lab Units 08/23/17  0530   SODIUM mmol/L 141   POTASSIUM mmol/L 3.9   CHLORIDE mmol/L 98   CO2 mmol/L 34.4*   BUN mg/dL 14   CREATININE mg/dL 0.96   GLUCOSE mg/dL 128*   CALCIUM mg/dL 8.9           Results from last 7 days  Lab Units 08/23/17  0530   WBC 10*3/mm3 8.55   HEMOGLOBIN g/dL 8.5*   HEMATOCRIT % 28.2*   PLATELETS 10*3/mm3 278       Results from last 7 days  Lab Units 08/21/17  0451 08/20/17  0431 08/19/17  0440  08/17/17  0743   INR  1.84* 2.08* 2.22*  < > 1.81*   APTT seconds  --   --   --   --  48.8*   < > = values in this interval not displayed.              @LABRCNT(bnp)@  I reviewed the patient's new clinical results.  I personally viewed and interpreted the patient's EKG/Telemetry data        Medication Review:   Meds reviewed      sodium chloride 30 mL/hr Last Rate: 30 mL/hr (08/17/17 8418)       Assessment/Plan  1.  GI bleed.  Status post " polypectomy. Also with hemorrhoids.  Upper endoscopy showed gastritis no stricture.  Hb stable today  2.  Atrial fibrillation/flutter.  Status post atrial appendage closure and cryo-maze procedure no documented recurrence since her cardioversion in 2014.  We'll maintain her off warfarin..  On aspirin daily she does appear to still be in sinus rhythm.  3.  History of severe mitral and tricuspid insufficiency status post mitral replacement with tissue valve and tricuspid valve repair.  4.  Marked pulmonary hypertension.  Not a transplant candidate follows with pulmonary.  5.  History of hypertension blood pressure adequately controlled.  6.  Renal insufficiency improved  8.  Hyperlipidemia.  9.  Volume overload.   now stable continue the same.        Earnest Gan MD  08/23/17  7:35 AM

## 2017-08-24 ENCOUNTER — TELEPHONE (OUTPATIENT)
Dept: FAMILY MEDICINE CLINIC | Facility: CLINIC | Age: 62
End: 2017-08-24

## 2017-08-24 PROBLEM — I77.1 CELIAC ARTERY STENOSIS: Status: ACTIVE | Noted: 2017-08-24

## 2017-08-24 PROBLEM — I77.4 CELIAC ARTERY STENOSIS: Status: ACTIVE | Noted: 2017-08-24

## 2017-08-24 LAB
ALBUMIN SERPL-MCNC: 3.9 G/DL (ref 3.5–5.2)
ALBUMIN/GLOB SERPL: 1.5 G/DL
ALP SERPL-CCNC: 75 U/L (ref 39–117)
ALT SERPL W P-5'-P-CCNC: 31 U/L (ref 1–33)
ANION GAP SERPL CALCULATED.3IONS-SCNC: 10.8 MMOL/L
AST SERPL-CCNC: 26 U/L (ref 1–32)
BACTERIA SPEC RESP CULT: NORMAL
BASOPHILS # BLD AUTO: 0 10*3/MM3 (ref 0–0.2)
BASOPHILS NFR BLD AUTO: 0 % (ref 0–1.5)
BILIRUB SERPL-MCNC: 0.7 MG/DL (ref 0.1–1.2)
BUN BLD-MCNC: 16 MG/DL (ref 8–23)
BUN/CREAT SERPL: 16.7 (ref 7–25)
CALCIUM SPEC-SCNC: 9.4 MG/DL (ref 8.6–10.5)
CHLORIDE SERPL-SCNC: 95 MMOL/L (ref 98–107)
CO2 SERPL-SCNC: 36.2 MMOL/L (ref 22–29)
CREAT BLD-MCNC: 0.96 MG/DL (ref 0.57–1)
DEPRECATED RDW RBC AUTO: 48.6 FL (ref 37–54)
EOSINOPHIL # BLD AUTO: 0 10*3/MM3 (ref 0–0.7)
EOSINOPHIL NFR BLD AUTO: 0 % (ref 0.3–6.2)
ERYTHROCYTE [DISTWIDTH] IN BLOOD BY AUTOMATED COUNT: 14.7 % (ref 11.7–13)
GFR SERPL CREATININE-BSD FRML MDRD: 59 ML/MIN/1.73
GLOBULIN UR ELPH-MCNC: 2.6 GM/DL
GLUCOSE BLD-MCNC: 133 MG/DL (ref 65–99)
GRAM STN SPEC: NORMAL
HCT VFR BLD AUTO: 29.6 % (ref 35.6–45.5)
HGB BLD-MCNC: 8.9 G/DL (ref 11.9–15.5)
IMM GRANULOCYTES # BLD: 0.02 10*3/MM3 (ref 0–0.03)
IMM GRANULOCYTES NFR BLD: 0.2 % (ref 0–0.5)
LYMPHOCYTES # BLD AUTO: 0.9 10*3/MM3 (ref 0.9–4.8)
LYMPHOCYTES NFR BLD AUTO: 9.2 % (ref 19.6–45.3)
MCH RBC QN AUTO: 27.4 PG (ref 26.9–32)
MCHC RBC AUTO-ENTMCNC: 30.1 G/DL (ref 32.4–36.3)
MCV RBC AUTO: 91.1 FL (ref 80.5–98.2)
MONOCYTES # BLD AUTO: 0.3 10*3/MM3 (ref 0.2–1.2)
MONOCYTES NFR BLD AUTO: 3.1 % (ref 5–12)
NEUTROPHILS # BLD AUTO: 8.54 10*3/MM3 (ref 1.9–8.1)
NEUTROPHILS NFR BLD AUTO: 87.5 % (ref 42.7–76)
PLATELET # BLD AUTO: 289 10*3/MM3 (ref 140–500)
PMV BLD AUTO: 10 FL (ref 6–12)
POTASSIUM BLD-SCNC: 3.7 MMOL/L (ref 3.5–5.2)
PROT SERPL-MCNC: 6.5 G/DL (ref 6–8.5)
RBC # BLD AUTO: 3.25 10*6/MM3 (ref 3.9–5.2)
SODIUM BLD-SCNC: 142 MMOL/L (ref 136–145)
WBC NRBC COR # BLD: 9.76 10*3/MM3 (ref 4.5–10.7)

## 2017-08-24 PROCEDURE — 63710000001 PREDNISONE PER 1 MG: Performed by: INTERNAL MEDICINE

## 2017-08-24 PROCEDURE — 99231 SBSQ HOSP IP/OBS SF/LOW 25: CPT | Performed by: INTERNAL MEDICINE

## 2017-08-24 PROCEDURE — 99221 1ST HOSP IP/OBS SF/LOW 40: CPT | Performed by: SURGERY

## 2017-08-24 PROCEDURE — 85025 COMPLETE CBC W/AUTO DIFF WBC: CPT | Performed by: INTERNAL MEDICINE

## 2017-08-24 PROCEDURE — 80053 COMPREHEN METABOLIC PANEL: CPT | Performed by: INTERNAL MEDICINE

## 2017-08-24 PROCEDURE — 94799 UNLISTED PULMONARY SVC/PX: CPT

## 2017-08-24 PROCEDURE — 97110 THERAPEUTIC EXERCISES: CPT

## 2017-08-24 PROCEDURE — 99232 SBSQ HOSP IP/OBS MODERATE 35: CPT | Performed by: INTERNAL MEDICINE

## 2017-08-24 RX ORDER — PREDNISONE 10 MG/1
TABLET ORAL
Qty: 31 TABLET | Refills: 0 | Status: SHIPPED | OUTPATIENT
Start: 2017-08-24 | End: 2017-09-07

## 2017-08-24 RX ORDER — LISINOPRIL 5 MG/1
5 TABLET ORAL ONCE
Status: DISCONTINUED | OUTPATIENT
Start: 2017-08-24 | End: 2017-08-25 | Stop reason: HOSPADM

## 2017-08-24 RX ORDER — LISINOPRIL 10 MG/1
10 TABLET ORAL
Status: DISCONTINUED | OUTPATIENT
Start: 2017-08-24 | End: 2017-08-25 | Stop reason: HOSPADM

## 2017-08-24 RX ORDER — ALBUTEROL SULFATE 90 UG/1
2 AEROSOL, METERED RESPIRATORY (INHALATION) EVERY 4 HOURS PRN
Qty: 6.7 G | Refills: 11 | Status: SHIPPED | OUTPATIENT
Start: 2017-08-24 | End: 2020-12-01 | Stop reason: SDUPTHER

## 2017-08-24 RX ADMIN — NYSTATIN 500000 UNITS: 100000 SUSPENSION ORAL at 13:59

## 2017-08-24 RX ADMIN — HYDROCODONE BITARTRATE AND ACETAMINOPHEN 1 TABLET: 5; 325 TABLET ORAL at 13:59

## 2017-08-24 RX ADMIN — ROPINIROLE 2 MG: 2 TABLET, FILM COATED ORAL at 20:35

## 2017-08-24 RX ADMIN — HYDROCODONE BITARTRATE AND ACETAMINOPHEN 1 TABLET: 5; 325 TABLET ORAL at 04:38

## 2017-08-24 RX ADMIN — MULTIPLE VITAMINS W/ MINERALS TAB 1 TABLET: TAB at 08:15

## 2017-08-24 RX ADMIN — HYDROCODONE BITARTRATE AND ACETAMINOPHEN 1 TABLET: 5; 325 TABLET ORAL at 17:49

## 2017-08-24 RX ADMIN — ATORVASTATIN CALCIUM 40 MG: 20 TABLET, FILM COATED ORAL at 08:15

## 2017-08-24 RX ADMIN — CARVEDILOL 12.5 MG: 12.5 TABLET, FILM COATED ORAL at 08:15

## 2017-08-24 RX ADMIN — TEMAZEPAM 30 MG: 15 CAPSULE ORAL at 22:07

## 2017-08-24 RX ADMIN — IPRATROPIUM BROMIDE AND ALBUTEROL SULFATE 3 ML: .5; 3 SOLUTION RESPIRATORY (INHALATION) at 07:15

## 2017-08-24 RX ADMIN — CYCLOBENZAPRINE HYDROCHLORIDE 10 MG: 10 TABLET, FILM COATED ORAL at 20:35

## 2017-08-24 RX ADMIN — BUDESONIDE AND FORMOTEROL FUMARATE DIHYDRATE 2 PUFF: 160; 4.5 AEROSOL RESPIRATORY (INHALATION) at 19:59

## 2017-08-24 RX ADMIN — FERROUS SULFATE TAB 325 MG (65 MG ELEMENTAL FE) 325 MG: 325 (65 FE) TAB at 17:47

## 2017-08-24 RX ADMIN — CARVEDILOL 12.5 MG: 12.5 TABLET, FILM COATED ORAL at 17:47

## 2017-08-24 RX ADMIN — GUAIFENESIN 600 MG: 600 TABLET, EXTENDED RELEASE ORAL at 20:35

## 2017-08-24 RX ADMIN — SERTRALINE 100 MG: 100 TABLET, FILM COATED ORAL at 08:15

## 2017-08-24 RX ADMIN — GUAIFENESIN 600 MG: 600 TABLET, EXTENDED RELEASE ORAL at 08:15

## 2017-08-24 RX ADMIN — IPRATROPIUM BROMIDE AND ALBUTEROL SULFATE 3 ML: .5; 3 SOLUTION RESPIRATORY (INHALATION) at 03:25

## 2017-08-24 RX ADMIN — IPRATROPIUM BROMIDE AND ALBUTEROL SULFATE 3 ML: .5; 3 SOLUTION RESPIRATORY (INHALATION) at 19:59

## 2017-08-24 RX ADMIN — LISINOPRIL 10 MG: 10 TABLET ORAL at 08:14

## 2017-08-24 RX ADMIN — FUROSEMIDE 40 MG: 40 TABLET ORAL at 17:47

## 2017-08-24 RX ADMIN — FUROSEMIDE 40 MG: 40 TABLET ORAL at 08:15

## 2017-08-24 RX ADMIN — IPRATROPIUM BROMIDE AND ALBUTEROL SULFATE 3 ML: .5; 3 SOLUTION RESPIRATORY (INHALATION) at 23:04

## 2017-08-24 RX ADMIN — FERROUS SULFATE TAB 325 MG (65 MG ELEMENTAL FE) 325 MG: 325 (65 FE) TAB at 08:15

## 2017-08-24 RX ADMIN — PANTOPRAZOLE SODIUM 40 MG: 40 TABLET, DELAYED RELEASE ORAL at 06:20

## 2017-08-24 RX ADMIN — BUDESONIDE AND FORMOTEROL FUMARATE DIHYDRATE 2 PUFF: 160; 4.5 AEROSOL RESPIRATORY (INHALATION) at 07:14

## 2017-08-24 RX ADMIN — IPRATROPIUM BROMIDE AND ALBUTEROL SULFATE 3 ML: .5; 3 SOLUTION RESPIRATORY (INHALATION) at 10:41

## 2017-08-24 RX ADMIN — HYDROCODONE BITARTRATE AND ACETAMINOPHEN 1 TABLET: 5; 325 TABLET ORAL at 08:23

## 2017-08-24 RX ADMIN — CETIRIZINE HYDROCHLORIDE 10 MG: 10 TABLET, FILM COATED ORAL at 08:15

## 2017-08-24 RX ADMIN — HYDROCODONE BITARTRATE AND ACETAMINOPHEN 1 TABLET: 5; 325 TABLET ORAL at 22:07

## 2017-08-24 RX ADMIN — SUCRALFATE 1 G: 1 SUSPENSION ORAL at 06:20

## 2017-08-24 RX ADMIN — IPRATROPIUM BROMIDE AND ALBUTEROL SULFATE 3 ML: .5; 3 SOLUTION RESPIRATORY (INHALATION) at 15:02

## 2017-08-24 RX ADMIN — HYDROCORTISONE ACETATE 25 MG: 25 SUPPOSITORY RECTAL at 08:15

## 2017-08-24 RX ADMIN — PREDNISONE 40 MG: 20 TABLET ORAL at 08:15

## 2017-08-24 RX ADMIN — ASPIRIN 81 MG: 81 TABLET ORAL at 08:15

## 2017-08-24 RX ADMIN — NYSTATIN 500000 UNITS: 100000 SUSPENSION ORAL at 08:15

## 2017-08-24 NOTE — THERAPY DISCHARGE NOTE
Acute Care - Physical Therapy Treatment Note/Discharge  [facility]     Patient Name: Paz Browne  : 1955  MRN: [id number]  Today's Date: 2017             Admit Date: 2017    Visit Dx:    ICD-10-CM ICD-9-CM   1. Lower GI bleed K92.2 578.9   2. Dysplastic polyp of colon K63.5 211.3   3. Abnormal esophagram R93.3 793.4   4. Anemia, unspecified type D64.9 285.9   5. Difficulty walking R26.2 719.7   6. Chronic respiratory failure with hypoxia J96.11 518.83     799.02   7. Primary osteoarthritis involving multiple joints M15.0 715.09     Patient Active Problem List   Diagnosis   • PAF (paroxysmal atrial fibrillation)   • Hypertension   • Hyperlipidemia   • COPD (chronic obstructive pulmonary disease)   • Pain medication agreement   • Hiatal hernia   • Primary osteoarthritis involving multiple joints   • Pulmonary hypertension   • S/P TVR (tricuspid valve repair)   • Long term current use of anticoagulant   • Pulmonary nodule   • Hemoptysis   • RLS (restless legs syndrome)   • Chronic low back pain   • Dysplastic polyp of colon   • Lower GI bleed   • History of mitral valve replacement with tissue graft   • Chronic respiratory failure with hypoxia   • Anemia       Physical Therapy Education     Title: PT OT SLP Therapies (Resolved)     Topic: Physical Therapy (Resolved)     Point: Mobility training (Resolved)    Learning Progress Summary    Learner Readiness Method Response Comment Documented by Status   Patient Acceptance E SUDHAKAR MALONEY 17 1456 Done    LOGAN Bustamante D VU JM 17 1706 Done    Acceptance E SUDHAKAR MALONEY 17 1558 Done    Acceptance E SUDHAKAR MALONEY 17 1042 Done    Debra REED mobility and safety SI 17 1150 Done    Acceptance EEFFIE DU DO 17 0953 Done   Family LOGAN Bustamante D VU JM 17 1706 Done               Point: Home exercise program (Resolved)    Learning Progress Summary    Learner Readiness Method Response Comment Documented by Status   Patient Acceptance E SUDHAKAR MALONEY  08/24/17 1456 Done    Eager E,TB,D VU  JM 08/23/17 1706 Done    Acceptance E VU  HAIDER 08/22/17 1558 Done    Acceptance E VU  HAIDER 08/21/17 1042 Done    Eager E DU mobility and safety SI 08/20/17 1150 Done    Acceptance E,D VU,DU  DO 08/19/17 0953 Done   Family Eager E,TB,D VU   08/23/17 1706 Done               Point: Body mechanics (Resolved)    Learning Progress Summary    Learner Readiness Method Response Comment Documented by Status   Patient Acceptance E VU  HAIDER 08/24/17 1456 Done    Eager E,TB,D VU   08/23/17 1706 Done    Acceptance E VU  HAIDER 08/22/17 1558 Done    Acceptance E VU  HAIDER 08/21/17 1042 Done    Eager E DU mobility and safety SI 08/20/17 1150 Done    Acceptance E,D VU,DU  DO 08/19/17 0953 Done   Family Eager E,TB,D VU   08/23/17 1706 Done               Point: Precautions (Resolved)    Learning Progress Summary    Learner Readiness Method Response Comment Documented by Status   Patient Acceptance E VU  HAIDER 08/24/17 1456 Done    Eager E,TB,D VU   08/23/17 1706 Done    Acceptance E VU  HAIDER 08/22/17 1558 Done    Acceptance E VU  HAIDER 08/21/17 1042 Done    Eager E DU mobility and safety SI 08/20/17 1150 Done    Acceptance E,D VU,DU  DO 08/19/17 0953 Done   Family Eager E,TB,D VU   08/23/17 1706 Done                      User Key     Initials Effective Dates Name Provider Type Discipline    SI 05/18/15 -  Amrita Loredo, PTA Physical Therapy Assistant PT     02/18/16 -  Trish Newby PTA Physical Therapy Assistant PT    DO 01/27/16 -  Jules Parra, PT Physical Therapist PT    HAIDER 04/24/15 -  Jaleel Sheppard PTA Physical Therapy Assistant PT                    IP PT Goals       08/24/17 1457 08/22/17 1558 08/19/17 0953    Bed Mobility PT LTG    Bed Mobility PT LTG, Date Established   08/19/17  -DO    Bed Mobility PT LTG, Time to Achieve   1 wk  -DO    Bed Mobility PT LTG, Activity Type   all bed mobility  -DO    Bed Mobility PT LTG, [place] Level   independent  -DO    Bed Mobility PT LTG, Date Goal  Reviewed 08/24/17  -HAIDER      Bed Mobility PT LTG, Outcome goal met  -HAIDER      Transfer Training PT LTG    Transfer Training PT LTG, Date Established   08/19/17  -DO    Transfer Training PT LTG, Time to Achieve   1 wk  -DO    Transfer Training PT LTG, Activity Type   all transfers  -DO    Transfer Training PT LTG, [place] Level   conditional independence  -DO    Transfer Training PT LTG, Assist Device   walker, rolling  -DO    Transfer Training PT  LTG, Date Goal Reviewed 08/24/17  -HAIDER      Transfer Training PT LTG, Outcome goal met  -HAIDER      Gait Training PT LTG    Gait Training Goal PT LTG, Date Established   08/19/17  -DO    Gait Training Goal PT LTG, Time to Achieve   1 wk  -DO    Gait Training Goal PT LTG, [place] Level   conditional independence  -DO    Gait Training Goal PT LTG, Assist Device   walker, rolling  -DO    Gait Training Goal PT LTG, Distance to Achieve   150 ft  -DO    Gait Training Goal PT LTG, Date Goal Reviewed 08/24/17  -HAIDER 08/22/17  -HAIDER     Gait Training Goal PT LTG, Outcome goal met  -HAIDER goal met  -HAIDER       User Key  (r) = Recorded By, (t) = Taken By, (c) = Cosigned By    Initials Name Provider Type    DO Jules Parra, PT Physical Therapist    HAIDER Sheppard PTA Physical Therapy Assistant              Adult Rehabilitation Note       08/24/17 1415 08/23/17 1650 08/22/17 1540    Rehab Assessment/Intervention    Discipline physical therapy assistant  -HAIDER physical therapy assistant  -MANAS physical therapy assistant  -HAIDER    Document Type therapy note (daily note)  -HAIDER therapy note (daily note)  -MANAS therapy note (daily note)  -HAIDER    Subjective Information agree to therapy  -HAIDER agree to therapy;complains of;weakness;fatigue  -MANAS agree to therapy  -HAIDER    Patient Effort, Rehab Treatment good  -HAIDER  good  -HAIDER    Precautions/Limitations fall precautions;oxygen therapy device and L/min  -HAIDER fall precautions;oxygen therapy device and L/min   4L  -JM fall precautions;oxygen therapy device and L/min   -HAIDER    Recorded by [HAIDER] Jaleel Sheppard PTA [JM] Trish Newby PTA [HAIDER] Jaleel Sheppard PTA    Vital Signs    Pre SpO2 (%)  94  -JM     O2 Delivery Pre Treatment  supplemental O2  -JM     Post SpO2 (%) 95  -AHIDER 95  -JM     O2 Delivery Post Treatment supplemental O2  -HAIDER supplemental O2  -JM     Recorded by [HAIDER] Jaleel Sheppard PTA [JM] Trish Newby PTA     Pain Assessment    Pain Assessment No/denies pain  -HAIDER No/denies pain  -JM No/denies pain  -HAIDER    Recorded by [HAIDER] Jaleel Sheppard PTA [JM] Trish Newby PTA [HAIDER] Jaleel Sheppard PTA    Cognitive Assessment/Intervention    Current Cognitive/Communication Assessment functional  -HAIDER  functional  -HAIDER    Orientation Status oriented x 4  -HAIDER  oriented x 4  -HAIDER    Follows Commands/Answers Questions 100% of the time  -HAIDER  100% of the time  -HAIDER    Personal Safety WNL/WFL  -HAIDER  WNL/WFL  -HAIDER    Personal Safety Interventions fall prevention program maintained;gait belt;nonskid shoes/slippers when out of bed  -HAIDER  fall prevention program maintained;gait belt;nonskid shoes/slippers when out of bed  -HAIDER    Recorded by [HAIDER] Jaleel Sheppard PTA  [HAIDER] Jaleel Sheppard PTA    Bed Mobility, Assessment/Treatment    Bed Mobility, Assistive Device --  -HAIDER bed rails;head of bed elevated  -JM bed rails;head of bed elevated  -HAIDER    Bed Mob, Supine to Sit, [place] independent  -HAIDER contact guard assist;supervision required;verbal cues required  -JM conditional independence  -HAIDER    Bed Mob, Sit to Supine, [place] independent  -HAIDER supervision required  - conditional independence  -HAIDER    Bed Mobility, Impairments  strength decreased  -JM strength decreased  -HAIDER    Recorded by [HAIDER] Jaleel Sheppard PTA [JM] Trish Newby PTA [HAIDER] Jaleel Sheppard PTA    Transfer Assessment/Treatment    Transfers, Sit-Stand [place] independent  -HAIDER contact guard assist;verbal cues required  - conditional independence  -HAIDER    Transfers, Stand-Sit [place] independent  -HAIDER stand by assist  - conditional  independence  -HAIDER    Transfers, Sit-Stand-Sit, Assist Device  rolling walker  -JM rolling walker  -HAIDER    Toilet Transfer, [place]  supervision required;verbal cues required  -JM     Toilet Transfer, Assistive Device  rolling walker  -JM     Transfer, Safety Issues step length decreased;weight-shifting ability decreased  -HAIDER      Transfer, Impairments strength decreased;impaired balance  -HAIDER      Recorded by [HAIDER] Jaleel Sheppard PTA [JM] Trish Newby PTA [HAIDER] Jaleel Sheppard PTA    Gait Assessment/Treatment    Gait, [place] Level independent  -HAIDER contact guard assist;verbal cues required  -JM conditional independence  -HAIDER    Gait, Assistive Device --   none  -HAIDER rolling walker  -JM rolling walker  -HAIDER    Gait, Distance (Feet) 150  -HAIDER 150  -  -HAIDRE    Gait, Gait Deviations [place] decreased;decreased heel strike;forward flexed posture;limb motion velocity decreased;step length decreased;toe-to-floor clearance decreased;weight-shifting ability decreased  -HAIDER step length decreased  -JM [place] decreased;forward flexed posture;limb motion velocity decreased;step length decreased  -HAIDER    Gait, Safety Issues step length decreased;weight-shifting ability decreased;supplemental O2  -HAIDER supplemental O2;step length decreased  -JM supplemental O2;weight-shifting ability decreased  -HAIDER    Gait, Impairments strength decreased;impaired balance  -HAIDER strength decreased  -JM strength decreased  -HAIDER    Gait, Comment  slight assist to guide rwx  -JM     Recorded by [HAIDER] Jaleel Sheppard PTA [JM] Trish Newby PTA [HAIDER] Jaleel Sheppard PTA    Therapy Exercises    Bilateral Lower Extremities   AROM:;10 reps;ankle pumps/circles;LAQ;hip flexion  -HAIDER    Recorded by   [HAIDER] Jaleel Sheppard PTA    Positioning and Restraints    Pre-Treatment Position in bed  -HAIDER in bed  -JM in bed  -HAIDER    Post Treatment Position bed  -HAIDER  bed  -HAIDER    In Bed supine;call light within reach;encouraged to call for assist;notified nsg  -HAIDER supine;call light  within reach;encouraged to call for assist;with family/caregiver   no alarm upon entry  -MANAS sitting EOB;call light within reach;encouraged to call for assist;with nsg  -HAIDER    Recorded by [HAIDER] Jaleel Sheppard PTA [MANAS] Trish Newby PTA [HAIDER] Jaleel Sheppard PTA      User Key  (r) = Recorded By, (t) = Taken By, (c) = Cosigned By    Initials Name Effective Dates    MANAS Newby PTA 02/18/16 -     HAIDER Sheppard PTA 04/24/15 -           PT Recommendation and Plan  Anticipated Equipment Needs At Discharge: front wheeled walker  Anticipated Discharge Disposition: home with assist, home with home health  Planned Therapy Interventions: bed mobility training, gait training, strengthening, transfer training  PT Frequency: daily  Plan of Care Review  Plan Of Care Reviewed With: patient  Outcome Summary/Follow up Plan: Pt has met all PT goals at this time.            Outcome Measures       08/24/17 1400 08/23/17 1700 08/22/17 1500    How much help from another person do you currently need...    Turning from your back to your side while in flat bed without using bedrails? 4  -HAIDER 4  -JM 4  -HAIDER    Moving from lying on back to sitting on the side of a flat bed without bedrails? 4  -HAIDER 3  -JM 4  -HAIDER    Moving to and from a bed to a chair (including a wheelchair)? 4  -HAIDER 3  -JM 4  -HAIDER    Standing up from a chair using your arms (e.g., wheelchair, bedside chair)? 4  -HAIDER 3  -JM 4  -HAIDER    Climbing 3-5 steps with a railing? 4  -HAIDER 3  -JM 3  -HAIDER    To walk in hospital room? 4  -HAIDER 3  -JM 4  -HAIDER    AM-PAC 6 Clicks Score 24  -HAIDER 19  -JM 23  -HAIDER    Functional Assessment    Outcome Measure Options AM-PAC 6 Clicks Basic Mobility (PT)  -HAIDER  AM-PAC 6 Clicks Basic Mobility (PT)  -HAIDER      User Key  (r) = Recorded By, (t) = Taken By, (c) = Cosigned By    Initials Name Provider Type    MANAS Newby PTA Physical Therapy Assistant    HAIDER Sheppard PTA Physical Therapy Assistant           Time Calculation:         PT Charges       08/24/17  1456          Time Calculation    Start Time 1415  -HAIDER      Stop Time 1430  -HAIDER      Time Calculation (min) 15 min  -HAIDER      PT Received On 08/24/17  -HAIDER      PT - Next Appointment 08/25/17  -HAIDER        User Key  (r) = Recorded By, (t) = Taken By, (c) = Cosigned By    Initials Name Provider Type    HAIDER Sheppard PTA Physical Therapy Assistant          Therapy Charges for Today     Code Description Service Date Service Provider Modifiers Qty    85543090443 HC PT THER PROC EA 15 MIN 8/24/2017 Jaleel Sheppard PTA GP 1          PT G-Codes  Outcome Measure Options: AM-PAC 6 Clicks Basic Mobility (PT)    PT Discharge Summary  Reason for Discharge: All goals achieved  Outcomes Achieved: Able to achieve all goals within established timeline  Discharge Destination: Home with home health    Jaleel Sheppard PTA  8/24/2017

## 2017-08-24 NOTE — PROGRESS NOTES
Continued Stay Note  [facility]     Patient Name: Paz Browne  MRN: [id number]  Today's Date: 8/24/2017    Admit Date: 8/9/2017          Discharge Plan       08/24/17 1341    Case Management/Social Work Plan    Plan Home with [facility]    Patient/Family In Agreement With Plan yes    Additional Comments Spoke with pt and caregiver, Felicia, at bedside.  Pt states she has no discharge needs and plans to return home with [facility] if needed.  States she does have walker but does not feel she needs it. Was up in room independently.  CCP will continue to follow. BC Meza RN              Discharge Codes     None        Expected Discharge Date and Time     Expected Discharge Date Expected Discharge Time    Aug 24, 2017             Beverly Meza

## 2017-08-24 NOTE — CONSULTS
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        HPI Comments: Patient underwent screening colonoscopy and polypectomy on 8/4/2017..  Subsequent to that procedure, she passed several bloody bowel movements.  This prompted a follow-up exam during which clips were placed on the polypectomy site in the transverse colon.  She has diverticulosis.  After the second exam, she began having abdominal pain in lower abdomen.  She is evaluated with CT imaging that shows abdominal arterial vascular calcifications with mild to moderate celiac artery stenosis..  There is small hiatal hernia.  There is no bowel obstruction.  There is no free air.  There is no free fluid.  There has been resolution of the hematochezia. There is no nausea or vomiting. There is no loss of appetite.  She is status post cholecystectomy and hysterectomy.    Patient denies postprandial abdominal pain, food fear, or recent weight loss.  When I told her the classic findings of chronic mesenteric ischemia she denied all relevant points.    Rectal Bleeding   Associated symptoms include abdominal pain. Pertinent negatives include no arthralgias, chest pain or congestion.   Abdominal Pain   Associated symptoms include hematochezia. Pertinent negatives include no arthralgias.     Review of Systems   Constitutional: Positive for appetite change. Negative for activity change.   HENT: Negative for congestion and dental problem.    Respiratory: Negative for apnea and chest tightness.    Cardiovascular: Positive for leg swelling. Negative for chest pain.   Gastrointestinal: Positive for abdominal pain and hematochezia. Negative for abdominal distention.   Endocrine:  Negative for cold intolerance and heat intolerance.   Musculoskeletal: Negative for arthralgias and back pain.   Neurological: Negative for dizziness and facial asymmetry.   Psychiatric/Behavioral: Negative for agitation and behavioral problems.       Past Medical History:   Diagnosis Date   • Atrial fibrillation     Fani procedure   • Atrial flutter     cardioversion   • CHF (congestive heart failure)    • Colon polyp    • COPD (chronic obstructive pulmonary disease)    • Hyperlipidemia    • Hypertension    • Mitral regurgitation    • Palpitations    • Pulmonary hypertension    • Renal failure    • Tachycardia    • Valvular heart disease      Past Surgical History:   Procedure Laterality Date   • CARDIAC CATHETERIZATION  09/01/2014    Right dominant systemt, normal coronary arteries.    • CARDIAC CATHETERIZATION Left 6/10/2016    Procedure: Cardiac catheterization;  Surgeon: Sergei Hall MD;  Location: [facility] CATH INVASIVE LOCATION;  Service:    • CARDIAC CATHETERIZATION N/A 6/10/2016    Procedure: Right Heart Cath;  Surgeon: Sergei Hall MD;  Location: [facility] CATH INVASIVE LOCATION;  Service:    • COLONOSCOPY     • COLONOSCOPY N/A 8/4/2017    Procedure: COLONOSCOPY TO CECUM/TI WITH POLYPECTOMY ( COLD BX);  Surgeon: Cleveland Devine MD;  Location: [facility] ENDOSCOPY;  Service:    • COLONOSCOPY N/A 8/10/2017    Procedure: COLONOSCOPY to cecum and TI with 2 clips placed at transverse;  Surgeon: Earnest PALOMO MD;  Location: [facility] ENDOSCOPY;  Service:    • ENDOSCOPY N/A 8/17/2017    Procedure: ESOPHAGOGASTRODUODENOSCOPY;  Surgeon: Porsha Ruby MD;  Location: [facility] ENDOSCOPY;  Service:    • GALLBLADDER SURGERY     • HEMORRHOIDECTOMY     • HYSTERECTOMY     • KIDNEY SURGERY  04/22/2013   • MAZE PROCEDURE     • MITRAL VALVE REPLACEMENT     • TONSILLECTOMY     • TRICUSPID VALVE REPLACEMENT       Family History   Problem Relation Age of Onset   • Adopted: Yes   • No Known Problems Mother    • No Known Problems Father       Social History   Substance Use Topics   • Smoking status: Former Smoker     Quit date: 2007   • Smokeless tobacco: Never Used   • Alcohol use No      Comment: caffiene use     Prescriptions Prior to Admission   Medication Sig Dispense Refill Last Dose   • albuterol (PROVENTIL) (2.5 MG/3ML) 0.083% nebulizer solution Take 2.5 mg by nebulization 2 (Two) Times a Day As Needed for Wheezing.      • carvedilol (COREG) 6.25 MG tablet Take 1 tablet by mouth 2 (two) times a day with meals. 60 tablet 5 8/4/2017 at Unknown time   • cyclobenzaprine (FLEXERIL) 10 MG tablet Take 10 mg by mouth Every Night.      • enoxaparin (LOVENOX) 40 MG/0.4ML solution syringe Inject 40 mg under the skin Every 12 (Twelve) Hours.   8/9/2017 at Unknown time   • fluticasone-salmeterol (ADVAIR DISKUS) 250-50 MCG/DOSE DISKUS Inhale 1 puff 2 (Two) Times a Day. 3 each 3 8/4/2017 at Unknown time   • furosemide (LASIX) 40 MG tablet Take 1 tablet by mouth 2 (Two) Times a Day. 60 tablet 5    • HYDROcodone-acetaminophen (NORCO) 5-325 MG per tablet Take 1 tablet by mouth Every 8 (Eight) Hours As Needed for Moderate Pain (4-6). Chronic pain medicine for low back pain 90 tablet 0 8/3/2017 at Unknown time   • lisinopril (PRINIVIL,ZESTRIL) 5 MG tablet Take 1 tablet by mouth Daily. 30 tablet 3 8/4/2017 at Unknown time   • loratadine (CLARITIN) 10 MG tablet Take 10 mg by mouth 2 (Two) Times a Day.   8/4/2017 at Unknown time   • meclizine (ANTIVERT) 25 MG tablet Take 25 mg by mouth 3 (Three) Times a Day As Needed for dizziness.      • Multiple Vitamins-Minerals (MULTIVITAL) tablet Take 1 tablet by mouth Daily.      • omeprazole (priLOSEC) 40 MG capsule Take 1 capsule by mouth Daily. 90 capsule 1    • potassium chloride (K-DUR,KLOR-CON) 20 MEQ CR tablet Take 1 tablet by mouth 2 (Two) Times a Day. 60 tablet 5    • rOPINIRole (REQUIP) 2 MG tablet Take 1 tablet by mouth Every Night. 30 tablet 5 8/3/2017 at Unknown time   • sertraline (ZOLOFT) 100 MG tablet Take 1  tablet by mouth Daily. 90 tablet 1 2017 at Unknown time   • temazepam (RESTORIL) 30 MG capsule Take 30 mg by mouth At Night As Needed for Sleep.      • tiotropium (SPIRIVA HANDIHALER) 18 MCG per inhalation capsule Place 1 capsule into inhaler and inhale Daily. 90 capsule 3 2017 at Unknown time   • warfarin (COUMADIN) 3 MG tablet Take 6 mg by mouth See Admin Instructions. Patient reports she currently takes warfarin on  and  of each week.           aspirin 81 mg Oral Daily   atorvastatin 40 mg Oral Daily   budesonide-formoterol 2 puff Inhalation BID - RT   carvedilol 12.5 mg Oral BID With Meals   cetirizine 10 mg Oral Daily   cyclobenzaprine 10 mg Oral Nightly   ferrous sulfate 325 mg Oral BID With Meals   furosemide 40 mg Oral BID   guaiFENesin 600 mg Oral Q12H   hydrocortisone 25 mg Rectal BID   ipratropium-albuterol 3 mL Nebulization Q4H - RT   lisinopril 10 mg Oral Q24H   lisinopril 5 mg Oral Once   multivitamin with minerals 1 tablet Oral Daily   pantoprazole 40 mg Oral QAM   polyethylene glycol 17 g Oral BID   predniSONE 40 mg Oral Daily With Breakfast   rOPINIRole 2 mg Oral Nightly   sertraline 100 mg Oral Daily       sodium chloride 30 mL/hr Last Rate: 30 mL/hr (17 1059)     •  acetaminophen  •  albuterol  •  bisacodyl  •  calcium carbonate  •  HYDROcodone-acetaminophen  •  hyoscyamine  •  magnesium hydroxide  •  meclizine  •  Morphine  •  [] Morphine **AND** naloxone  •  ondansetron  •  potassium chloride **OR** potassium chloride **OR** potassium chloride  •  sodium chloride  •  sodium chloride  •  sodium chloride  •  sodium chloride  •  temazepam  Bupropion; Cephalexin; and Metoprolol    Objective     Physical Exam:  Physical Exam   Constitutional: She is oriented to person, place, and time. She appears well-developed and well-nourished.   HENT:   Head: Normocephalic and atraumatic.   Eyes: Pupils are equal, round, and reactive to light. Left eye exhibits no discharge.    Neck: Normal range of motion. No thyromegaly present.   Cardiovascular: Normal rate and regular rhythm.    Pulses:       Radial pulses are 2+ on the right side, and 2+ on the left side.        Dorsalis pedis pulses are 1+ on the right side, and 2+ on the left side.   Pulmonary/Chest: Effort normal. No respiratory distress.   Abdominal: Soft. There is tenderness. There is no rebound and no guarding.   Musculoskeletal: Normal range of motion. She exhibits edema.   Neurological: She is alert and oriented to person, place, and time.   Skin: Skin is warm and dry.   Psychiatric: She has a normal mood and affect. Her behavior is normal.       Vital Signs and Labs:  Vital Signs Patient Vitals for the past 24 hrs:   BP Temp Temp src Pulse Resp SpO2 Weight   08/24/17 1544 170/66 98.3 °F (36.8 °C) Oral - 18 (!) 72 % -   08/24/17 1502 - - - 73 18 - -   08/24/17 1209 169/75 97.9 °F (36.6 °C) Oral 74 18 - -   08/24/17 1041 - - - 77 18 98 % -   08/24/17 0714 (!) 183/89 97.9 °F (36.6 °C) Oral 74 16 96 % -   08/24/17 0600 - - - - - - 178 lb (80.7 kg)   08/24/17 0325 - - - - 16 - -   08/23/17 2341 - - - 68 16 - -   08/23/17 2300 145/50 98.9 °F (37.2 °C) Oral 67 18 96 % -   08/23/17 1910 - - - 76 18 95 % -   08/23/17 1900 138/60 97.6 °F (36.4 °C) Oral 77 18 96 % -     I/O:  I/O last 3 completed shifts:  In: 840 [P.O.:840]  Out: 4750 [Urine:4750]    CBC    Results from last 7 days  Lab Units 08/24/17  0431 08/23/17  0530 08/22/17  0445 08/21/17  0451 08/20/17  0431 08/19/17  0440 08/18/17  0334   WBC 10*3/mm3 9.76 8.55 9.91 10.28 9.09 7.63 5.99   HEMOGLOBIN g/dL 8.9* 8.5* 8.4* 9.0* 9.8* 8.8* 8.6*   PLATELETS 10*3/mm3 289 278 273 276 271 198 181     BMP   Results from last 7 days  Lab Units 08/24/17  0431 08/23/17  0530 08/22/17  0445 08/21/17  0451 08/20/17  0431 08/19/17  0440 08/18/17  0334   SODIUM mmol/L 142 141 140 143 139 140 140   POTASSIUM mmol/L 3.7 3.9 4.2 3.2* 3.9 3.5 3.8   CHLORIDE mmol/L 95* 98 98 98 96* 98 101   CO2  mmol/L 36.2* 34.4* 31.1* 32.2* 25.0 26.9 25.3   BUN mg/dL 16 14 12 12 12 16 17   CREATININE mg/dL 0.96 0.96 0.90 0.88 0.86 0.90 0.87   GLUCOSE mg/dL 133* 128* 129* 139* 145* 111* 105*     Cr Clearance Estimated Creatinine Clearance: 64.7 mL/min (by C-G formula based on Cr of 0.96).  Coag   Results from last 7 days  Lab Units 08/21/17  0451 08/20/17  0431 08/19/17  0440 08/18/17  0334   INR  1.84* 2.08* 2.22* 2.11*     HbA1C   Lab Results   Component Value Date    HGBA1C 5.7 (H) 09/01/2014     Blood Glucose No results found for: POCGLU  Micro   Results from last 7 days  Lab Units 08/21/17  2048   RESPCX  Moderate growth (3+) Normal Respiratory Lesly   GRAM STAIN RESULT  Moderate (3+) WBCs seen  Moderate (3+) Epithelial cells per low power field  Mixed bacterial morphotypes seen on Gram Stain       Active Hospital Problems (** Indicates Principal Problem)    Diagnosis Date Noted   • **Anemia [D64.9] 08/09/2017   • Celiac artery stenosis [I77.4] 08/24/2017   • Chronic respiratory failure with hypoxia [J96.11] 08/13/2017   • History of mitral valve replacement with tissue graft [Z95.4] 08/11/2017   • Lower GI bleed [K92.2] 08/09/2017   • Long term current use of anticoagulant [Z79.01] 10/13/2016   • Pulmonary hypertension [I27.2]    • Hypertension [I10]    • COPD (chronic obstructive pulmonary disease) [J44.9]    • PAF (paroxysmal atrial fibrillation) [I48.0] 01/25/2016      Resolved Hospital Problems    Diagnosis Date Noted Date Resolved   • VAN (acute kidney injury) [N17.9] 08/15/2017 08/23/2017   • Oral candidiasis [B37.0] 08/14/2017 08/23/2017   • Precordial pain [R07.2] 08/11/2017 08/23/2017     Problem Points:  3:  Patient has a new problem, with no additional work-up planned (max of 1)  Total problem points:3    Data Points:  1:  I have reviewed or order clinical lab test  1:  I have reviewed or order radiology test (except heart catheterization or echo)  2:  I have personally and independently review of image,  tracing, or specimen  Total data points:4 or more    Risk Points:  Moderate: New diagnosis with unknown prognosis    MDM requires 2/3 (Problem points, Data points and Risk)  MDM Prob point Data point Risk   SF 1 1 Minimal   Low 2 2 Low   Mod 3 3 Moderate   High 4 4 High     Code requires 3/3 (MDM, History and Exam)  Code MDM History Exam Time   39313 SF/Low Detailed Detailed 30   99693 Mod Comprehensive Comprehensive 50   52888 High Comprehensive Comprehensive 70     Detailed history:  4 elements HPI or status of 3 chronic problems; 2-9 system ROS  Comprehensive:  4 elements HPI or status of 3 chronic problems;  10 system ROS    Detailed Exam:  12 findings from any organ system  Comprehensive Exam:  2 findings from each of 9 systems.   05967    Assessment/Plan     Principal Problem:    Anemia  Active Problems:    PAF (paroxysmal atrial fibrillation)    Hypertension    COPD (chronic obstructive pulmonary disease)    Pulmonary hypertension    Long term current use of anticoagulant    Lower GI bleed    History of mitral valve replacement with tissue graft    Chronic respiratory failure with hypoxia    Celiac artery stenosis      62 y.o. female Admitted for gastrointestinal bleeding after polypectomy. After secondary procedure with clip placement, having some abdominal tenderness in the left lower quadrant in the left midline. CT scan a number of days ago showed mild-to-moderate celiac artery stenosis. I have personally reviewed the images and agree that there is a mild stenosis. She denies typical historic findings for chronic mesenteric ischemia. I suspect that this area of narrowing is completely asymptomatic. Nevertheless, I did recommend general cardiovascular risk factor reductions including baby aspirin, statin therapy, and blood pressure control. Agree with obtaining mesenteric duplex to confirm mild-to-moderate disease. However, if the duplex shows greater than 70% stenosis, I think this will not affect my  treatment in the hospital at all. What this will do is provide me a baseline to follow this as an outpatient.    As for the IVC dilatation, I do not appreciate any abnormalities in her IVC on CT scan. Will follow along.    I discussed the patients findings and my recommendations with patient.    Jonathan Lee MD  08/24/17  6:13 PM    Please call my office with any question: [phone]

## 2017-08-24 NOTE — PLAN OF CARE
Problem: Patient Care Overview (Adult)  Goal: Plan of Care Review  Outcome: Ongoing (interventions implemented as appropriate)  Remains on 3L O2. Medicated x 2 for pain. Rested well. Continues to complain of abdominal pain. Progressing towards goals, will continue to monitor.     08/24/17 0313   Coping/Psychosocial Response Interventions   Plan Of Care Reviewed With patient   Patient Care Overview   Progress progress toward functional goals as expected         Problem: Fall Risk (Adult)  Goal: Absence of Falls  Outcome: Ongoing (interventions implemented as appropriate)    Problem: Pain, Acute (Adult)  Goal: Acceptable Pain Control/Comfort Level  Outcome: Ongoing (interventions implemented as appropriate)    Problem: Respiratory Insufficiency (Adult)  Goal: Acid/Base Balance  Outcome: Ongoing (interventions implemented as appropriate)  Goal: Effective Ventilation  Outcome: Ongoing (interventions implemented as appropriate)

## 2017-08-24 NOTE — PLAN OF CARE
Problem: Inpatient Physical Therapy  Goal: Bed Mobility Goal LTG- PT  Outcome: Outcome(s) achieved Date Met:  08/24/17 08/24/17 1457   Bed Mobility PT LTG   Bed Mobility PT LTG, Date Goal Reviewed 08/24/17   Bed Mobility PT LTG, Outcome goal met       Goal: Transfer Training Goal 1 LTG- PT  Outcome: Outcome(s) achieved Date Met:  08/24/17 08/24/17 1457   Transfer Training PT LTG   Transfer Training PT LTG, Date Goal Reviewed 08/24/17   Transfer Training PT LTG, Outcome goal met       Goal: Gait Training Goal LTG- PT  Outcome: Outcome(s) achieved Date Met:  08/24/17 08/24/17 1457   Gait Training PT LTG   Gait Training Goal PT LTG, Date Goal Reviewed 08/24/17   Gait Training Goal PT LTG, Outcome goal met

## 2017-08-24 NOTE — PROGRESS NOTES
[facility]  Inpatient Progress Note    Reason for Follow Up:  Abdominal pain, GI bleeding    Subjective     Interval History:   No overt GI bleeding. Stable intermittent abdominal pain periumbilical and LLQ unchanged x several days. Pain is worse with coughing and movement. CT a/p without etiology of pain. Tolerating regular diet.       Current Facility-Administered Medications:   •  acetaminophen (TYLENOL) tablet 650 mg, 650 mg, Oral, Q6H PRN, Tata Newby MD  •  albuterol (PROVENTIL) nebulizer solution 0.083% 2.5 mg/3mL, 2.5 mg, Nebulization, Q4H PRN, Nick Tovar MD, 2.5 mg at 08/19/17 0410  •  aspirin EC tablet 81 mg, 81 mg, Oral, Daily, Earnest Gan MD, 81 mg at 08/24/17 0815  •  atorvastatin (LIPITOR) tablet 40 mg, 40 mg, Oral, Daily, Tata Newby MD, 40 mg at 08/24/17 0815  •  bisacodyl (DULCOLAX) suppository 10 mg, 10 mg, Rectal, Daily PRN, Tata Newby MD  •  budesonide-formoterol (SYMBICORT) 160-4.5 MCG/ACT inhaler 2 puff, 2 puff, Inhalation, BID - RT, Tata Newby MD, 2 puff at 08/24/17 0714  •  calcium carbonate (TUMS) chewable tablet 500 mg (200 mg elemental), 1 tablet, Oral, BID PRN, Tata Newby MD  •  carvedilol (COREG) tablet 12.5 mg, 12.5 mg, Oral, BID With Meals, Kirti Arteaga MD, 12.5 mg at 08/24/17 0815  •  cetirizine (zyrTEC) tablet 10 mg, 10 mg, Oral, Daily, Tata Newby MD, 10 mg at 08/24/17 0815  •  cyclobenzaprine (FLEXERIL) tablet 10 mg, 10 mg, Oral, Nightly, Tata Newby MD, 10 mg at 08/23/17 2054  •  ferrous sulfate tablet 325 mg, 325 mg, Oral, BID With Meals, Cleveland Devine MD, 325 mg at 08/24/17 0815  •  furosemide (LASIX) tablet 40 mg, 40 mg, Oral, BID, Dominique Ponce MD, 40 mg at 08/24/17 0815  •  guaiFENesin (MUCINEX) 12 hr tablet 600 mg, 600 mg, Oral, Q12H, Nick Tovar MD, 600 mg at 08/24/17 0815  •  HYDROcodone-acetaminophen (NORCO) 5-325 MG per tablet 1 tablet, 1  tablet, Oral, Q4H PRN, Nick Tovar MD, 1 tablet at 17 0823  •  hydrocortisone (ANUSOL-HC) suppository 25 mg, 25 mg, Rectal, BID, Earnest PALOMO MD, 25 mg at 17 0815  •  hyoscyamine (LEVSIN) SL tablet 125 mcg, 125 mcg, Sublingual, Q4H PRN, Montserrat Newby, APRN, 125 mcg at 17 1107  •  ipratropium-albuterol (DUO-NEB) nebulizer solution 3 mL, 3 mL, Nebulization, Q4H - RT, Guanako Crenshaw MD, 3 mL at 17 0715  •  lisinopril (PRINIVIL,ZESTRIL) tablet 10 mg, 10 mg, Oral, Q24H, Earnest Gan MD, 10 mg at 17 0814  •  lisinopril (PRINIVIL,ZESTRIL) tablet 5 mg, 5 mg, Oral, Once, Earnest Gan MD  •  magnesium hydroxide (MILK OF MAGNESIA) suspension 2400 mg/10mL 10 mL, 10 mL, Oral, Daily PRN, Tata Newby MD  •  meclizine (ANTIVERT) tablet 25 mg, 25 mg, Oral, TID PRN, Tata Newby MD  •  Morphine sulfate (PF) injection 1 mg, 1 mg, Intravenous, Q4H PRN, Nick Tovar MD, 1 mg at 17 0402  •  multivitamin with minerals 1 tablet, 1 tablet, Oral, Daily, Tata Newby MD, 1 tablet at 17 0815  •  [] Morphine sulfate (PF) injection 2 mg, 2 mg, Intravenous, Q4H PRN, 2 mg at 17 1459 **AND** naloxone (NARCAN) injection 0.4 mg, 0.4 mg, Intravenous, Q5 Min PRN, Tata Newby MD  •  nystatin (MYCOSTATIN) 188316 UNIT/ML suspension 500,000 Units, 5 mL, Oral, 4x Daily, Fam Sanderson MD, 500,000 Units at 17 0815  •  ondansetron (ZOFRAN) injection 4 mg, 4 mg, Intravenous, Q6H PRN, Tata Newby MD, 4 mg at 17 2232  •  pantoprazole (PROTONIX) EC tablet 40 mg, 40 mg, Oral, QAM, Tata Newby MD, 40 mg at 17 0620  •  polyethylene glycol (MIRALAX) powder 17 g, 17 g, Oral, BID, Montserrat Newby, APRN  •  potassium chloride (MICRO-K) CR capsule 40 mEq, 40 mEq, Oral, PRN, 40 mEq at 17 2329 **OR** potassium chloride (KLOR-CON) packet 40 mEq, 40 mEq, Oral, PRN **OR** potassium chloride 10  mEq in 100 mL IVPB, 10 mEq, Intravenous, Q1H PRN, Nick Tovar MD  •  predniSONE (DELTASONE) tablet 40 mg, 40 mg, Oral, Daily With Breakfast, Guanako Crenshaw MD, 40 mg at 08/24/17 0815  •  rOPINIRole (REQUIP) tablet 2 mg, 2 mg, Oral, Nightly, Tata Newby MD, 2 mg at 08/23/17 2054  •  sertraline (ZOLOFT) tablet 100 mg, 100 mg, Oral, Daily, Tata Newby MD, 100 mg at 08/24/17 0815  •  sodium chloride (OCEAN) nasal spray 2 spray, 2 spray, Each Nare, PRN, Leonel Ruiz MD  •  sodium chloride 0.9 % flush 1-10 mL, 1-10 mL, Intravenous, PRN, Tata Newby MD  •  sodium chloride 0.9 % flush 10 mL, 10 mL, Intravenous, PRN, Amrit Reinoso MD  •  sodium chloride 0.9 % infusion, 30 mL/hr, Intravenous, Continuous PRN, Porsha Ruby MD, Last Rate: 30 mL/hr at 08/17/17 1059, 30 mL/hr at 08/17/17 1059  •  sucralfate (CARAFATE) 1 GM/10ML suspension 1 g, 1 g, Oral, Q6H, Porsha Ruby MD, 1 g at 08/24/17 0620  •  temazepam (RESTORIL) capsule 30 mg, 30 mg, Oral, Nightly PRN, Tata Newby MD, 30 mg at 08/23/17 2309  Review of Systems:    She has lower abdominal cramping with movement, all other systems reviewed and negative    Objective     Vital Signs  Temp:  [97.6 °F (36.4 °C)-98.9 °F (37.2 °C)] 97.9 °F (36.6 °C)  Heart Rate:  [64-77] 74  Resp:  [16-20] 16  BP: (138-183)/(50-89) 183/89  Body mass index is 27.88 kg/(m^2).    Intake/Output Summary (Last 24 hours) at 08/24/17 0849  Last data filed at 08/24/17 0630   Gross per 24 hour   Intake              600 ml   Output             4050 ml   Net            -3450 ml           Physical Exam:   General: patient awake, alert and cooperative   Eyes: Normal lids and lashes, no scleral icterus   Neck: supple, normal ROM   Skin: warm and dry, not jaundiced   Cardiovascular: regular rhythm and rate, no murmurs auscultated   Pulm: clear to auscultation bilaterally, regular and unlabored   Abdomen: soft, tender LLQ and periumbilical,  nondistended; normal bowel sounds   Rectal: deferred   Extremities: no rash or edema   Psychiatric: Normal mood and behavior; memory intact     Results Review:     I reviewed the patient's new clinical results.      Results from last 7 days  Lab Units 08/24/17 0431 08/23/17 0530 08/22/17 0445   WBC 10*3/mm3 9.76 8.55 9.91   HEMOGLOBIN g/dL 8.9* 8.5* 8.4*   HEMATOCRIT % 29.6* 28.2* 28.2*   PLATELETS 10*3/mm3 289 278 273       Results from last 7 days  Lab Units 08/24/17 0431 08/23/17 0530 08/22/17 0445 08/19/17 0440   SODIUM mmol/L 142 141 140  < > 140   POTASSIUM mmol/L 3.7 3.9 4.2  < > 3.5   CHLORIDE mmol/L 95* 98 98  < > 98   CO2 mmol/L 36.2* 34.4* 31.1*  < > 26.9   BUN mg/dL 16 14 12  < > 16   CREATININE mg/dL 0.96 0.96 0.90  < > 0.90   CALCIUM mg/dL 9.4 8.9 9.0  < > 9.3   BILIRUBIN mg/dL 0.7  --   --   --  0.7   ALK PHOS U/L 75  --   --   --  99   ALT (SGPT) U/L 31  --   --   --  46*   AST (SGOT) U/L 26  --   --   --  39*   GLUCOSE mg/dL 133* 128* 129*  < > 111*   < > = values in this interval not displayed.    Results from last 7 days  Lab Units 08/21/17 0451 08/20/17 0431 08/19/17 0440   INR  1.84* 2.08* 2.22*       Lab Results  Lab Value Date/Time   LIPASE 46 08/13/2017 0449       Radiology:  CT Abdomen Pelvis With Contrast   Preliminary Result   1. Small sliding hernia. Mild wall thickening of the gastric fundus   could be secondary to collapsed mucosal folds. Recommend upper GI   endoscopy to evaluate if not recently performed.   2. Pelvic floor weakening.   3. Pulmonary emphysema.          XR Abdomen KUB   Final Result      XR Abdomen KUB   Final Result      XR Chest 2 View   Final Result   There are chronic parenchymal changes in the lungs. There   has been sternotomy. The cardiac silhouette is enlarged and the   pulmonary vasculature appears more prominent than on some of the recent   prior exams, suggesting there may be an element of heart failure or   volume overload. However, there is no  pulmonary edema nor is there any   infiltrate.       This report was finalized on 8/21/2017 8:53 PM by Dr. Akbar Kumar MD.          XR Abdomen KUB   Final Result   A substantial volume of contrast material remains within the   colon.       This report was finalized on 8/21/2017 8:53 PM by Dr. Akbar Kumar MD.          XR Abdomen KUB   Final Result      XR Chest PA & Lateral   Final Result      FL Upper GI With Air Contrast & SBFT   Final Result      XR Chest PA & Lateral   Final Result      CT Abdomen Pelvis Without Contrast   Final Result   1. No evidence for acute intra-abdominal process.   2. Basilar pulmonary emphysema.   3. Hiatal hernia. Atherosclerotic disease. Previous cholecystectomy and   hysterectomy.       This report was finalized on 8/9/2017 3:26 PM by Dr. Magdi Greer MD.              Assessment/Plan     Patient Active Problem List   Diagnosis   • PAF (paroxysmal atrial fibrillation)   • Hypertension   • Hyperlipidemia   • COPD (chronic obstructive pulmonary disease)   • Pain medication agreement   • Hiatal hernia   • Primary osteoarthritis involving multiple joints   • Pulmonary hypertension   • S/P TVR (tricuspid valve repair)   • Long term current use of anticoagulant   • Pulmonary nodule   • Hemoptysis   • RLS (restless legs syndrome)   • Chronic low back pain   • Dysplastic polyp of colon   • Lower GI bleed   • History of mitral valve replacement with tissue graft   • Chronic respiratory failure with hypoxia   • Anemia       BA [place]    PE - VS seen  Lungs - CTA  CV - RRR  ABD - BS+, soft, mild discomfort in the periumbilical and LLQ of the abdomen.  EXT - No CCE    Impression:  #1 Lower GI bleeding: resolved. Hgb stable in mid 8 range. Onset in the postprocedural setting s/p colonoscopy with bleeding internal hemorrhoids. Congested mucosa in transverse colon and clips placed on 8/4/17. H/H stable and no gross evidence of bleeding.  #2 anticoagulant therapy:  "Coumadin stopped 8/15 per cards due to continued drop in Hgb, OK with GI for asa with plans to remain off AC  #3 Abdominal discomfort- SBFT with no obstructive process, evidence of esophagitis and mild stenosis. EGD 8/17/17 with gastritis.  abdominal discomfort not related to po intake and unchanged over last several days.  + BM's overnight. The CT abd/pelvis shows \"At least 50% luminal narrowing of the celiac axis artery at its origin caused by calcified plaque.\" Also \"small hiatal hernia, mild wall thickening of the gastric fundus could be secondary to callapsed mucosal folds.\" (She had an EGD 8/17/17)  #4 ANNA- oral iron therapy initiated. H&H stable      Recommendations:  - continue PPI  - results of CT abd/pelvis without new intraabdominal process. Consider getting a General Surgeon to evaluate her abdominal pain? Consider evaluating for mesenteric artery blockages though this doesn't sound like mesenteric ischemia? The evaluation for mesenteric ischemia could be done as an outpatient.   - Continue antispasmodic PRN  - If she goes home then she could f/u with me in the office in 4 weeks.     Cleveland Devine M.D.        "

## 2017-08-24 NOTE — PROGRESS NOTES
"CC: MVR/PAF    Interval History:   Feels better. Ready to go home.    Vital Signs  Temp:  [97.6 °F (36.4 °C)-98.9 °F (37.2 °C)] 97.9 °F (36.6 °C)  Heart Rate:  [64-77] 74  Resp:  [16-20] 16  BP: (138-183)/(50-89) 183/89    Intake/Output Summary (Last 24 hours) at 08/24/17 0758  Last data filed at 08/24/17 0630   Gross per 24 hour   Intake              840 ml   Output             4050 ml   Net            -3210 ml     Flowsheet Rows         First Filed Value    Admission Height  67\" (170.2 cm) Documented at 08/09/2017 1152    Admission Weight  166 lb (75.3 kg) Documented at 08/09/2017 1152          PHYSICAL EXAM:  General: No acute distress  Resp:NL Rate, unlabored, Diffuse coarse rales scattered   wheezing   CV:NL rate and rhythm, NL PMI, Nl S1 and S2, 2/6 systolic [place], no gallop, no rub, No JVD. Normal pedal pulses  ABD:Nl sounds, no masses or tenderness, nondistended, no quarding or rebound  Neuro: alert,cooperative and oriented  Extr: 1+ bilateral tibial edema or no cyanosis, moves all extremities      Results Review:      Results from last 7 days  Lab Units 08/24/17  0431   SODIUM mmol/L 142   POTASSIUM mmol/L 3.7   CHLORIDE mmol/L 95*   CO2 mmol/L 36.2*   BUN mg/dL 16   CREATININE mg/dL 0.96   GLUCOSE mg/dL 133*   CALCIUM mg/dL 9.4           Results from last 7 days  Lab Units 08/24/17  0431   WBC 10*3/mm3 9.76   HEMOGLOBIN g/dL 8.9*   HEMATOCRIT % 29.6*   PLATELETS 10*3/mm3 289       Results from last 7 days  Lab Units 08/21/17  0451 08/20/17  0431 08/19/17  0440   INR  1.84* 2.08* 2.22*                 @LABRCNT(bnp)@  I reviewed the patient's new clinical results.  I personally viewed and interpreted the patient's EKG/Telemetry data        Medication Review:   Meds reviewed      sodium chloride 30 mL/hr Last Rate: 30 mL/hr (08/17/17 1059)       Assessment/Plan  1.  GI bleed.  Status post polypectomy. Also with hemorrhoids.  Upper endoscopy showed gastritis no stricture.  Hb stable today.  From our standpoint " okay to DC today she has an appointment to see us in a couple weeks.  2.  Atrial fibrillation/flutter.  Status post atrial appendage closure and cryo-maze procedure no documented recurrence since her cardioversion in 2014.  We'll maintain her off warfarin..  On aspirin daily she does appear to still be in sinus rhythm.  3.  History of severe mitral and tricuspid insufficiency status post mitral replacement with tissue valve and tricuspid valve repair.  4.  Marked pulmonary hypertension.  Not a transplant candidate follows with pulmonary.  5.  History of hypertension blood pressure adequately controlled.  6.  Renal insufficiency improved  8.  Hyperlipidemia.  9.  Volume overload.   now stable continue the same.        Earnest Gan MD  08/24/17  7:58 AM

## 2017-08-24 NOTE — PLAN OF CARE
Problem: Patient Care Overview (Adult)  Goal: Plan of Care Review  Outcome: Ongoing (interventions implemented as appropriate)    08/24/17 1209   Coping/Psychosocial Response Interventions   Plan Of Care Reviewed With patient   Patient Care Overview   Progress improving   Outcome Evaluation   Outcome Summary/Follow up Plan VSS. Pt still complains of pain in LLQ of abdomen. Anticipating discharge today. Will continue to monitor.       Goal: Adult Individualization and Mutuality  Outcome: Ongoing (interventions implemented as appropriate)  Goal: Discharge Needs Assessment  Outcome: Ongoing (interventions implemented as appropriate)    Problem: Fall Risk (Adult)  Goal: Absence of Falls  Outcome: Ongoing (interventions implemented as appropriate)    Problem: Pain, Acute (Adult)  Goal: Acceptable Pain Control/Comfort Level  Outcome: Ongoing (interventions implemented as appropriate)    Problem: GI Endoscopy (Adult)  Goal: Signs and Symptoms of Listed Potential Problems Will be Absent or Manageable (GI Endoscopy)  Outcome: Ongoing (interventions implemented as appropriate)    Problem: Respiratory Insufficiency (Adult)  Goal: Acid/Base Balance  Outcome: Ongoing (interventions implemented as appropriate)  Goal: Effective Ventilation  Outcome: Ongoing (interventions implemented as appropriate)

## 2017-08-24 NOTE — CONSULTS
REASON FOR CONSULT:    Abdominal pain    REQUESTING PHYSICIAN:    Cleveland Devine MD    HISTORY OF PRESENT ILLNESS:    Paz Browne is a 62 y.o. female who underwent a recent screening colonoscopy and polypectomy.  Subsequent to that procedure, she passed several bloody bowel movements.  This prompted a follow-up exam during which clips were placed on the polypectomy site in the transverse colon.  She has diverticulosis.  After the second exam, she began having abdominal pain in lower abdomen.  She is evaluated with CT imaging that shows abdominal arterial vascular calcifications.  There is small hiatal hernia.  There is no bowel obstruction.  There is no free air.  There is no free fluid.  There has been resolution of the hematochezia. There is no nausea or vomiting. There is no loss of appetite.  She is status post cholecystectomy and hysterectomy.    Past Medical History:   Diagnosis Date   • Atrial fibrillation     Fani procedure   • Atrial flutter     cardioversion   • CHF (congestive heart failure)    • Colon polyp    • COPD (chronic obstructive pulmonary disease)    • Hyperlipidemia    • Hypertension    • Mitral regurgitation    • Palpitations    • Pulmonary hypertension    • Renal failure    • Tachycardia    • Valvular heart disease        Past Surgical History:   Procedure Laterality Date   • CARDIAC CATHETERIZATION  09/01/2014    Right dominant systemt, normal coronary arteries.    • CARDIAC CATHETERIZATION Left 6/10/2016    Procedure: Cardiac catheterization;  Surgeon: Sergei Hall MD;  Location: [facility] CATH INVASIVE LOCATION;  Service:    • CARDIAC CATHETERIZATION N/A 6/10/2016    Procedure: Right Heart Cath;  Surgeon: Sergei Hall MD;  Location: [facility] CATH INVASIVE LOCATION;  Service:    • COLONOSCOPY     • COLONOSCOPY N/A 8/4/2017    Procedure: COLONOSCOPY TO CECUM/TI WITH POLYPECTOMY ( COLD BX);  Surgeon: Cleveland Devine MD;  Location: [facility] ENDOSCOPY;  Service:    • COLONOSCOPY N/A 8/10/2017     Procedure: COLONOSCOPY to cecum and TI with 2 clips placed at transverse;  Surgeon: Earnest PALOMO MD;  Location: [facility] ENDOSCOPY;  Service:    • ENDOSCOPY N/A 8/17/2017    Procedure: ESOPHAGOGASTRODUODENOSCOPY;  Surgeon: Porhsa Ruby MD;  Location: [facility] ENDOSCOPY;  Service:    • GALLBLADDER SURGERY     • HEMORRHOIDECTOMY     • HYSTERECTOMY     • KIDNEY SURGERY  04/22/2013   • MAZE PROCEDURE     • MITRAL VALVE REPLACEMENT     • TONSILLECTOMY     • TRICUSPID VALVE REPLACEMENT           Current Facility-Administered Medications:   •  acetaminophen (TYLENOL) tablet 650 mg, 650 mg, Oral, Q6H PRN, Tata Newby MD  •  albuterol (PROVENTIL) nebulizer solution 0.083% 2.5 mg/3mL, 2.5 mg, Nebulization, Q4H PRN, Nick Tovar MD, 2.5 mg at 08/19/17 0410  •  aspirin EC tablet 81 mg, 81 mg, Oral, Daily, Earnest Gan MD, 81 mg at 08/24/17 0815  •  atorvastatin (LIPITOR) tablet 40 mg, 40 mg, Oral, Daily, Tata Newby MD, 40 mg at 08/24/17 0815  •  bisacodyl (DULCOLAX) suppository 10 mg, 10 mg, Rectal, Daily PRN, Tata Newby MD  •  budesonide-formoterol (SYMBICORT) 160-4.5 MCG/ACT inhaler 2 puff, 2 puff, Inhalation, BID - RT, Tata Newby MD, 2 puff at 08/24/17 0714  •  calcium carbonate (TUMS) chewable tablet 500 mg (200 mg elemental), 1 tablet, Oral, BID PRN, Tata Newby MD  •  carvedilol (COREG) tablet 12.5 mg, 12.5 mg, Oral, BID With Meals, Kirti Arteaga MD, 12.5 mg at 08/24/17 0815  •  cetirizine (zyrTEC) tablet 10 mg, 10 mg, Oral, Daily, Tata Newby MD, 10 mg at 08/24/17 0815  •  cyclobenzaprine (FLEXERIL) tablet 10 mg, 10 mg, Oral, Nightly, Tata Newby MD, 10 mg at 08/23/17 2054  •  ferrous sulfate tablet 325 mg, 325 mg, Oral, BID With Meals, Cleveland Devine MD, 325 mg at 08/24/17 0815  •  furosemide (LASIX) tablet 40 mg, 40 mg, Oral, BID, Dominique oPnce MD, 40 mg at 08/24/17 0815  •  guaiFENesin (MUCINEX) 12 hr tablet 600  mg, 600 mg, Oral, Q12H, Nick Tovar MD, 600 mg at 17 0815  •  HYDROcodone-acetaminophen (NORCO) 5-325 MG per tablet 1 tablet, 1 tablet, Oral, Q4H PRN, Nick Tovra MD, 1 tablet at 17 1359  •  hydrocortisone (ANUSOL-HC) suppository 25 mg, 25 mg, Rectal, BID, Earnest PALOMO MD, 25 mg at 17 0815  •  hyoscyamine (LEVSIN) SL tablet 125 mcg, 125 mcg, Sublingual, Q4H PRN, Montserrat Newby, APRN, 125 mcg at 17 1107  •  ipratropium-albuterol (DUO-NEB) nebulizer solution 3 mL, 3 mL, Nebulization, Q4H - RT, Guanako Crenshaw MD, 3 mL at 17 1502  •  lisinopril (PRINIVIL,ZESTRIL) tablet 10 mg, 10 mg, Oral, Q24H, Earnest Gan MD, 10 mg at 17 0814  •  lisinopril (PRINIVIL,ZESTRIL) tablet 5 mg, 5 mg, Oral, Once, Earnest Gan MD  •  magnesium hydroxide (MILK OF MAGNESIA) suspension 2400 mg/10mL 10 mL, 10 mL, Oral, Daily PRN, Tata Newby MD  •  meclizine (ANTIVERT) tablet 25 mg, 25 mg, Oral, TID PRN, Tata Newby MD  •  Morphine sulfate (PF) injection 1 mg, 1 mg, Intravenous, Q4H PRN, Nick Tovar MD, 1 mg at 17 0402  •  multivitamin with minerals 1 tablet, 1 tablet, Oral, Daily, Tata Newby MD, 1 tablet at 17 0815  •  [] Morphine sulfate (PF) injection 2 mg, 2 mg, Intravenous, Q4H PRN, 2 mg at 17 1459 **AND** naloxone (NARCAN) injection 0.4 mg, 0.4 mg, Intravenous, Q5 Min PRN, Tata Newby MD  •  nystatin (MYCOSTATIN) 779318 UNIT/ML suspension 500,000 Units, 5 mL, Oral, 4x Daily, Fam Sanderson MD, 500,000 Units at 17 1359  •  ondansetron (ZOFRAN) injection 4 mg, 4 mg, Intravenous, Q6H PRN, Tata Newby MD, 4 mg at 17 2232  •  pantoprazole (PROTONIX) EC tablet 40 mg, 40 mg, Oral, QAM, Tata Newby MD, 40 mg at 17 0620  •  polyethylene glycol (MIRALAX) powder 17 g, 17 g, Oral, BID, Montserart Newby, APRN  •  potassium chloride (MICRO-K) CR capsule 40 mEq,  40 mEq, Oral, PRN, 40 mEq at 08/21/17 2329 **OR** potassium chloride (KLOR-CON) packet 40 mEq, 40 mEq, Oral, PRN **OR** potassium chloride 10 mEq in 100 mL IVPB, 10 mEq, Intravenous, Q1H PRN, Nick Tovar MD  •  predniSONE (DELTASONE) tablet 40 mg, 40 mg, Oral, Daily With Breakfast, Guanako Crenshaw MD, 40 mg at 08/24/17 0815  •  rOPINIRole (REQUIP) tablet 2 mg, 2 mg, Oral, Nightly, Tata Newby MD, 2 mg at 08/23/17 2054  •  sertraline (ZOLOFT) tablet 100 mg, 100 mg, Oral, Daily, Tata Newby MD, 100 mg at 08/24/17 0815  •  sodium chloride (OCEAN) nasal spray 2 spray, 2 spray, Each Nare, PRN, Leonel Ruiz MD  •  sodium chloride 0.9 % flush 1-10 mL, 1-10 mL, Intravenous, PRN, Tata Newby MD  •  sodium chloride 0.9 % flush 10 mL, 10 mL, Intravenous, PRN, Amrit Reinoso MD  •  sodium chloride 0.9 % infusion, 30 mL/hr, Intravenous, Continuous PRN, Porsha Ruby MD, Last Rate: 30 mL/hr at 08/17/17 1059, 30 mL/hr at 08/17/17 1059  •  temazepam (RESTORIL) capsule 30 mg, 30 mg, Oral, Nightly PRN, Tata Newby MD, 30 mg at 08/23/17 2309    Allergies   Allergen Reactions   • Bupropion    • Cephalexin    • Metoprolol        Family History   Problem Relation Age of Onset   • Adopted: Yes   • No Known Problems Mother    • No Known Problems Father        Social History     Social History   • Marital status:      Spouse name: N/A   • Number of children: N/A   • Years of education: N/A     Occupational History   • Not on file.     Social History Main Topics   • Smoking status: Former Smoker     Quit date: 2007   • Smokeless tobacco: Never Used   • Alcohol use No      Comment: caffiene use   • Drug use: No   • Sexual activity: Defer     Other Topics Concern   • Not on file     Social History Narrative       Review of Systems   Constitutional: Negative for fever and unexpected weight change.   Respiratory: Negative for shortness of breath.    Cardiovascular: Negative for  "chest pain.   Gastrointestinal: Positive for blood in stool and diarrhea. Negative for nausea and vomiting.   Genitourinary: Negative for dysuria.       Objective     /66 (BP Location: Left arm, Patient Position: Lying)  Pulse 73  Temp 98.3 °F (36.8 °C) (Oral)   Resp 18  Ht 67\" (170.2 cm)  Wt 178 lb (80.7 kg)  SpO2 (!) 72%  BMI 27.88 kg/m2    Physical Exam   Constitutional: She is oriented to person, place, and time. She appears well-developed and well-nourished. No distress.   HENT:   Head: Normocephalic and atraumatic.   Eyes: Conjunctivae are normal. No scleral icterus.   Cardiovascular: Normal rate, regular rhythm, normal heart sounds and intact distal pulses.    No murmur heard.  Pulmonary/Chest: Effort normal and breath sounds normal. No respiratory distress. She has no wheezes. She has no rales.   Abdominal: Soft. Bowel sounds are normal. She exhibits no distension. There is tenderness (lower abdomen). There is guarding (voluntary). No hernia.   Musculoskeletal: She exhibits no edema or deformity.   Neurological: She is alert and oriented to person, place, and time.   Skin: Skin is warm and dry.   Psychiatric: She has a normal mood and affect.   Nursing note and vitals reviewed.      DIAGNOSTIC DATA:    I reviewed the CT imaging and the dictated reports.  There is a small hiatal hernia.  There is diverticulosis without evidence of diverticulitis.  No obstruction.  There is no free fluid or free air.  There are mesenteric vascular calcifications.  I reviewed the images and report of a fluoroscopic small bowel series.  I demonstrated is a small sliding hiatal hernia.  There is no obstruction.  There are no visible abnormalities to suggest stricture, polyp, or ulcer.  Current laboratory evaluation is not concerning for acidosis.  Carbon dioxide is 36.2.  Creatinine 0.96.  Liver function testing is within normal limits.  White blood cell count is 9.76.  Hemoglobin is 8.9.    ASSESSMENT:    Abdominal " pain following multiple endoscopic procedures.    PLAN:    I think it is reasonable to follow-up the CT findings of mesenteric vascular atherosclerosis with mesenteric duplex ultrasonography as ordered by Dr. Ponce.  I do not expect she will require surgical intervention at this time.  I will see PRN.

## 2017-08-24 NOTE — PROGRESS NOTES
[facility]  Phone: [phone]  Guanako Crenshaw MD    Subjective:  LOS: 15    Still abd pain. Substantially better with medrol and minimal wheezing.    Objective   Vital Signs past 24hrs  BP range: BP: (138-183)/(50-89) 169/75  Pulse range: Heart Rate:  [64-77] 74  Resp rate range: Resp:  [16-20] 18  Temp range: Temp (24hrs), Av.1 °F (36.7 °C), Min:97.6 °F (36.4 °C), Max:98.9 °F (37.2 °C)    O2 Device: nasal cannulaFlow (L/min):  [2-3] 3  Oxygen range:SpO2:  [95 %-98 %] 98 %   178 lb (80.7 kg); Body mass index is 27.88 kg/(m^2).    Intake/Output Summary (Last 24 hours) at 17 1212  Last data filed at 17 0910   Gross per 24 hour   Intake              840 ml   Output             4050 ml   Net            -3210 ml       Physical Exam   Eyes: Conjunctivae are normal.   Cardiovascular: Normal rate and regular rhythm.    No murmur heard.  Pulmonary/Chest: No respiratory distress. She has decreased breath sounds. She has no wheezes. She has no rales.   Abdominal: Soft. Bowel sounds are normal. She exhibits no mass. There is tenderness.   Musculoskeletal: She exhibits no edema.   Neurological: She is alert. No sensory deficit.   Skin: Skin is warm. No rash noted.     Results Review:    I have reviewed the laboratory and imaging data since the last note by [facility] physician.  My annotations are noted in assessment and plan.    Medication Review:  I have reviewed the current MAR.  My annotations are noted in assessment and plan.      sodium chloride 30 mL/hr Last Rate: 30 mL/hr (17 1059)     Plan   PCCM Problems  COPD exacerbation  Underlying severe COPD  Chronic hypoxic respiratory failure, on nasal cannula  Chronic hypercapnic respiratory failure  Lung nodules with demonstrated stability on previous CT  Relevant Medical Diagnoses  GI bleed  Abdominal pain  Previous valve replacement  Chronic atrial fibrillation    Plan of Treatment  No further wheezing on po pred, taper.    Continued complaints  of abdominal pain especially in the lower quadrants.  GI is following.    Her oxygen levels are adequate.  She will follow-up with Dr. Melo who is her regular pulmonologist and will discuss Trilogy as an outpatient.    No objection to discharge.    Guanako Crenshaw MD  08/24/17  12:12 PM    Part of this note may be an electronic transcription/translation of spoken language to printed text using the Dragon Dictation System.

## 2017-08-24 NOTE — PROGRESS NOTES
"     LOS: 15 days   Primary Care Physician: Javan Martinez MD     Subjective   Still having lower quadrant abdominal pain, constant.  Able to eat.  Getting up and moving around.  Does not want to go home yet because of the pain.  Leg swelling and breathing are better.    Vital Signs  Body mass index is 27.88 kg/(m^2).  Temp:  [97.6 °F (36.4 °C)-98.9 °F (37.2 °C)] 98.3 °F (36.8 °C)  Heart Rate:  [67-77] 73  Resp:  [16-18] 18  BP: (138-183)/(50-89) 170/66      Objective:  General Appearance:  In no acute distress (Morbidly obese.  Looks older than age).    Vital signs: (most recent): Blood pressure 170/66, pulse 73, temperature 98.3 °F (36.8 °C), temperature source Oral, resp. rate 18, height 67\" (170.2 cm), weight 178 lb (80.7 kg), SpO2 (!) 72 %.    Lungs:  There are rales and decreased breath sounds.  No wheezes or rhonchi.    Heart: Normal rate.  Regular rhythm.  No murmur.   Abdomen: Abdomen is soft and non-distended.  Bowel sounds are normal.   There is right lower quadrant and left lower quadrant tenderness.    Extremities: There is dependent edema.  (Trace to 1+)  Neurological: Patient is alert.          Results Review:    I reviewed the patient's new clinical results.      Results from last 7 days  Lab Units 08/24/17  0431 08/23/17  0530   WBC 10*3/mm3 9.76 8.55   HEMOGLOBIN g/dL 8.9* 8.5*   PLATELETS 10*3/mm3 289 278       Results from last 7 days  Lab Units 08/24/17  0431 08/23/17  0530   SODIUM mmol/L 142 141   POTASSIUM mmol/L 3.7 3.9   CHLORIDE mmol/L 95* 98   CO2 mmol/L 36.2* 34.4*   BUN mg/dL 16 14   CREATININE mg/dL 0.96 0.96   CALCIUM mg/dL 9.4 8.9   GLUCOSE mg/dL 133* 128*       Results from last 7 days  Lab Units 08/21/17  0451   INR  1.84*     Hemoglobin A1C:  Lab Results   Component Value Date    HGBA1C 5.7 (H) 09/01/2014       Glucose Range:No results found for: POCGLU    Lab Results   Component Value Date    ENLOKTKC59 892 08/13/2017       Lab Results   Component Value Date    TSH 3.780 " 08/10/2017       Assessment & Plan      Medication Review: Yes    Active Hospital Problems (** Indicates Principal Problem)    Diagnosis Date Noted   • **Anemia [D64.9] 08/09/2017   • Chronic respiratory failure with hypoxia [J96.11] 08/13/2017   • History of mitral valve replacement with tissue graft [Z95.4] 08/11/2017   • Lower GI bleed [K92.2] 08/09/2017   • Long term current use of anticoagulant [Z79.01] 10/13/2016   • Pulmonary hypertension [I27.2]    • Hypertension [I10]    • COPD (chronic obstructive pulmonary disease) [J44.9]    • PAF (paroxysmal atrial fibrillation) [I48.0] 01/25/2016      Resolved Hospital Problems    Diagnosis Date Noted Date Resolved   • VAN (acute kidney injury) [N17.9] 08/15/2017 08/23/2017   • Oral candidiasis [B37.0] 08/14/2017 08/23/2017   • Precordial pain [R07.2] 08/11/2017 08/23/2017       Assessment/Plan  1.  Anemia with GI bleeding.  Hemoglobin stable.  Off Coumadin.  Recheck in a.m.  2.  Lower quadrant abdominal pain.  CT abdomen, small bowel follow-through, EGD all done.  Will order duplex study of mesenteric arteries.  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  Will ask vascular to see patient.    3.  COPD exacerbation with chronic respiratory failure and hypoxemia.  She is back on 3 L which is what she is on at home  4.  Acute and chronic diastolic congestive heart failure with pulmonary hypertension.  Back on home dose of Lasix.    Dominique Ponce MD  08/24/17  4:36 PM

## 2017-08-25 ENCOUNTER — APPOINTMENT (OUTPATIENT)
Dept: CARDIOLOGY | Facility: HOSPITAL | Age: 62
End: 2017-08-25
Attending: INTERNAL MEDICINE

## 2017-08-25 VITALS
HEART RATE: 72 BPM | DIASTOLIC BLOOD PRESSURE: 77 MMHG | OXYGEN SATURATION: 97 % | SYSTOLIC BLOOD PRESSURE: 172 MMHG | HEIGHT: 67 IN | BODY MASS INDEX: 27.84 KG/M2 | WEIGHT: 177.4 LBS | RESPIRATION RATE: 18 BRPM | TEMPERATURE: 98.2 F

## 2017-08-25 PROBLEM — L30.4 INTERTRIGO: Status: ACTIVE | Noted: 2017-08-25

## 2017-08-25 PROBLEM — I50.33 ACUTE ON CHRONIC DIASTOLIC CONGESTIVE HEART FAILURE (HCC): Status: ACTIVE | Noted: 2017-08-25

## 2017-08-25 LAB
ANION GAP SERPL CALCULATED.3IONS-SCNC: 12.2 MMOL/L
BH CV VAS SMA 1ST PP TIME: 15 MIN
BH CV VAS SMA 2ND PP TIME: 30 MIN
BH CV VAS SMA 3RD PP TIME: 45 MIN
BH CV VAS SMA AORTA PSV: 93 CM/S
BH CV VAS SMA CELIAC DIST EDV: 52 CM/S
BH CV VAS SMA CELIAC DIST PSV: 175 CM/S
BH CV VAS SMA CELIAC MID EDV: 45 CM/S
BH CV VAS SMA CELIAC MID PSV: 191 CM/S
BH CV VAS SMA CELIAC ORIGIN EDV: 95 CM/S
BH CV VAS SMA CELIAC ORIGIN PSV: 331 CM/S
BH CV VAS SMA CELIAC PROX EDV: 64 CM/S
BH CV VAS SMA CELIAC PROX PSV: 176 CM/S
BH CV VAS SMA HEPATIC EDV: 20 CM/S
BH CV VAS SMA HEPATIC PSV: 84 CM/S
BH CV VAS SMA IMA EDV: 43 CM/S
BH CV VAS SMA IMA PSV: 356 CM/S
BH CV VAS SMA ORIGIN EDV: 27 CM/S
BH CV VAS SMA ORIGIN PSV: 195 CM/S
BH CV VAS SMA SMA DIST EDV: 18 CM/S
BH CV VAS SMA SMA DIST PSV: 138 CM/S
BH CV VAS SMA SMA MID EDV: 12 CM/S
BH CV VAS SMA SMA MID PSV: 115 CM/S
BH CV VAS SMA SMA PROX EDV: 30 CM/S
BH CV VAS SMA SMA PROX PSV: 201 CM/S
BH CV VAS SMA SPLENIC EDV: 36 CM/S
BH CV VAS SMA SPLENIC PSV: 118 CM/S
BUN BLD-MCNC: 18 MG/DL (ref 8–23)
BUN/CREAT SERPL: 16.1 (ref 7–25)
CALCIUM SPEC-SCNC: 9 MG/DL (ref 8.6–10.5)
CHLORIDE SERPL-SCNC: 95 MMOL/L (ref 98–107)
CO2 SERPL-SCNC: 39.8 MMOL/L (ref 22–29)
CREAT BLD-MCNC: 1.12 MG/DL (ref 0.57–1)
DEPRECATED RDW RBC AUTO: 49.3 FL (ref 37–54)
ERYTHROCYTE [DISTWIDTH] IN BLOOD BY AUTOMATED COUNT: 14.9 % (ref 11.7–13)
GFR SERPL CREATININE-BSD FRML MDRD: 49 ML/MIN/1.73
GLUCOSE BLD-MCNC: 98 MG/DL (ref 65–99)
HCT VFR BLD AUTO: 31.8 % (ref 35.6–45.5)
HGB BLD-MCNC: 9.5 G/DL (ref 11.9–15.5)
MCH RBC QN AUTO: 27.3 PG (ref 26.9–32)
MCHC RBC AUTO-ENTMCNC: 29.9 G/DL (ref 32.4–36.3)
MCV RBC AUTO: 91.4 FL (ref 80.5–98.2)
PLATELET # BLD AUTO: 322 10*3/MM3 (ref 140–500)
PMV BLD AUTO: 9.9 FL (ref 6–12)
POTASSIUM BLD-SCNC: 3 MMOL/L (ref 3.5–5.2)
RBC # BLD AUTO: 3.48 10*6/MM3 (ref 3.9–5.2)
SODIUM BLD-SCNC: 147 MMOL/L (ref 136–145)
WBC NRBC COR # BLD: 12.99 10*3/MM3 (ref 4.5–10.7)

## 2017-08-25 PROCEDURE — 99231 SBSQ HOSP IP/OBS SF/LOW 25: CPT | Performed by: NURSE PRACTITIONER

## 2017-08-25 PROCEDURE — 94799 UNLISTED PULMONARY SVC/PX: CPT

## 2017-08-25 PROCEDURE — 80048 BASIC METABOLIC PNL TOTAL CA: CPT | Performed by: INTERNAL MEDICINE

## 2017-08-25 PROCEDURE — 63710000001 PREDNISONE PER 1 MG: Performed by: INTERNAL MEDICINE

## 2017-08-25 PROCEDURE — 93975 VASCULAR STUDY: CPT

## 2017-08-25 PROCEDURE — 85027 COMPLETE CBC AUTOMATED: CPT | Performed by: INTERNAL MEDICINE

## 2017-08-25 RX ORDER — ECHINACEA PURPUREA EXTRACT 125 MG
2 TABLET ORAL AS NEEDED
Status: ON HOLD
Start: 2017-08-25 | End: 2018-01-04

## 2017-08-25 RX ORDER — HYDROCODONE BITARTRATE AND ACETAMINOPHEN 5; 325 MG/1; MG/1
1 TABLET ORAL EVERY 4 HOURS PRN
Refills: 0
Start: 2017-08-25 | End: 2017-09-25 | Stop reason: SDUPTHER

## 2017-08-25 RX ORDER — HYDROCORTISONE ACETATE 25 MG/1
25 SUPPOSITORY RECTAL 2 TIMES DAILY PRN
Qty: 20 SUPPOSITORY | Refills: 0 | Status: ON HOLD | OUTPATIENT
Start: 2017-08-25 | End: 2018-01-04

## 2017-08-25 RX ORDER — POTASSIUM CHLORIDE 750 MG/1
40 CAPSULE, EXTENDED RELEASE ORAL ONCE
Status: DISCONTINUED | OUTPATIENT
Start: 2017-08-25 | End: 2017-08-25

## 2017-08-25 RX ORDER — FERROUS SULFATE 325(65) MG
325 TABLET ORAL 2 TIMES DAILY WITH MEALS
Qty: 60 TABLET | Refills: 0 | Status: SHIPPED | OUTPATIENT
Start: 2017-08-25 | End: 2017-09-21 | Stop reason: SDUPTHER

## 2017-08-25 RX ORDER — NYSTATIN 100000 [USP'U]/G
POWDER TOPICAL EVERY 12 HOURS SCHEDULED
Status: DISCONTINUED | OUTPATIENT
Start: 2017-08-25 | End: 2017-08-25 | Stop reason: HOSPADM

## 2017-08-25 RX ORDER — CALCIUM CARBONATE 200(500)MG
1 TABLET,CHEWABLE ORAL 2 TIMES DAILY PRN
Status: ON HOLD
Start: 2017-08-25 | End: 2018-01-04

## 2017-08-25 RX ORDER — POTASSIUM CHLORIDE 20 MEQ/1
20 TABLET, EXTENDED RELEASE ORAL DAILY
Start: 2017-08-25 | End: 2017-09-19 | Stop reason: SDUPTHER

## 2017-08-25 RX ORDER — ALBUTEROL SULFATE 2.5 MG/3ML
2.5 SOLUTION RESPIRATORY (INHALATION) EVERY 4 HOURS PRN
Refills: 12
Start: 2017-08-25 | End: 2018-06-13 | Stop reason: SDUPTHER

## 2017-08-25 RX ORDER — POLYETHYLENE GLYCOL 3350 17 G/17G
17 POWDER, FOR SOLUTION ORAL 2 TIMES DAILY
Start: 2017-08-25

## 2017-08-25 RX ORDER — ASPIRIN 81 MG/1
81 TABLET ORAL DAILY
Start: 2017-08-25

## 2017-08-25 RX ORDER — POTASSIUM CHLORIDE 750 MG/1
40 CAPSULE, EXTENDED RELEASE ORAL ONCE
Status: COMPLETED | OUTPATIENT
Start: 2017-08-25 | End: 2017-08-25

## 2017-08-25 RX ORDER — LISINOPRIL 5 MG/1
10 TABLET ORAL DAILY
Start: 2017-08-25 | End: 2017-09-19 | Stop reason: SDUPTHER

## 2017-08-25 RX ORDER — CARVEDILOL 6.25 MG/1
12.5 TABLET ORAL 2 TIMES DAILY WITH MEALS
Start: 2017-08-25 | End: 2017-10-26 | Stop reason: SDUPTHER

## 2017-08-25 RX ORDER — HYDRALAZINE HYDROCHLORIDE 25 MG/1
25 TABLET, FILM COATED ORAL EVERY 6 HOURS PRN
Status: DISCONTINUED | OUTPATIENT
Start: 2017-08-25 | End: 2017-08-25 | Stop reason: HOSPADM

## 2017-08-25 RX ORDER — NYSTATIN 100000 [USP'U]/G
POWDER TOPICAL EVERY 12 HOURS SCHEDULED
Qty: 30 G | Refills: 0 | Status: SHIPPED | OUTPATIENT
Start: 2017-08-25 | End: 2017-09-07

## 2017-08-25 RX ORDER — FUROSEMIDE 40 MG/1
40 TABLET ORAL DAILY
Start: 2017-08-26 | End: 2017-09-27 | Stop reason: SDUPTHER

## 2017-08-25 RX ADMIN — SERTRALINE 100 MG: 100 TABLET, FILM COATED ORAL at 08:07

## 2017-08-25 RX ADMIN — ATORVASTATIN CALCIUM 40 MG: 20 TABLET, FILM COATED ORAL at 08:08

## 2017-08-25 RX ADMIN — IPRATROPIUM BROMIDE AND ALBUTEROL SULFATE 3 ML: .5; 3 SOLUTION RESPIRATORY (INHALATION) at 03:37

## 2017-08-25 RX ADMIN — HYDROCODONE BITARTRATE AND ACETAMINOPHEN 1 TABLET: 5; 325 TABLET ORAL at 03:49

## 2017-08-25 RX ADMIN — CETIRIZINE HYDROCHLORIDE 10 MG: 10 TABLET, FILM COATED ORAL at 08:08

## 2017-08-25 RX ADMIN — MULTIPLE VITAMINS W/ MINERALS TAB 1 TABLET: TAB at 08:08

## 2017-08-25 RX ADMIN — HYDROCODONE BITARTRATE AND ACETAMINOPHEN 1 TABLET: 5; 325 TABLET ORAL at 13:18

## 2017-08-25 RX ADMIN — ASPIRIN 81 MG: 81 TABLET ORAL at 08:08

## 2017-08-25 RX ADMIN — CARVEDILOL 12.5 MG: 12.5 TABLET, FILM COATED ORAL at 08:08

## 2017-08-25 RX ADMIN — GUAIFENESIN 600 MG: 600 TABLET, EXTENDED RELEASE ORAL at 08:08

## 2017-08-25 RX ADMIN — POTASSIUM CHLORIDE 40 MEQ: 750 CAPSULE, EXTENDED RELEASE ORAL at 08:23

## 2017-08-25 RX ADMIN — IPRATROPIUM BROMIDE AND ALBUTEROL SULFATE 3 ML: .5; 3 SOLUTION RESPIRATORY (INHALATION) at 07:15

## 2017-08-25 RX ADMIN — BUDESONIDE AND FORMOTEROL FUMARATE DIHYDRATE 2 PUFF: 160; 4.5 AEROSOL RESPIRATORY (INHALATION) at 07:15

## 2017-08-25 RX ADMIN — LISINOPRIL 10 MG: 10 TABLET ORAL at 03:56

## 2017-08-25 RX ADMIN — POTASSIUM CHLORIDE 40 MEQ: 750 CAPSULE, EXTENDED RELEASE ORAL at 08:09

## 2017-08-25 RX ADMIN — NYSTATIN 1 APPLICATION: 100000 POWDER TOPICAL at 13:22

## 2017-08-25 RX ADMIN — FUROSEMIDE 40 MG: 40 TABLET ORAL at 08:08

## 2017-08-25 RX ADMIN — IPRATROPIUM BROMIDE AND ALBUTEROL SULFATE 3 ML: .5; 3 SOLUTION RESPIRATORY (INHALATION) at 15:17

## 2017-08-25 RX ADMIN — PREDNISONE 40 MG: 20 TABLET ORAL at 08:08

## 2017-08-25 RX ADMIN — HYDRALAZINE HYDROCHLORIDE 25 MG: 25 TABLET, FILM COATED ORAL at 05:13

## 2017-08-25 RX ADMIN — PANTOPRAZOLE SODIUM 40 MG: 40 TABLET, DELAYED RELEASE ORAL at 08:08

## 2017-08-25 RX ADMIN — FERROUS SULFATE TAB 325 MG (65 MG ELEMENTAL FE) 325 MG: 325 (65 FE) TAB at 08:08

## 2017-08-25 RX ADMIN — HYDROCODONE BITARTRATE AND ACETAMINOPHEN 1 TABLET: 5; 325 TABLET ORAL at 08:08

## 2017-08-25 NOTE — NURSING NOTE
Discharge delayed. Waiting for pt to go for mesenteric artery doppler. Results will be called to MD, then pt may leave.

## 2017-08-25 NOTE — PROGRESS NOTES
[facility]  Phone: [phone]  Guanako Crenshaw MD    Subjective:  LOS: 16    Her resp feels much better. She is going home today on pred taper. Still abdominal discomfort. Plan is for repeat vascular study of SMA in 6 months.    Objective   Vital Signs past 24hrs  BP range: BP: (170-193)/(61-99) 172/77  Pulse range: Heart Rate:  [68-79] 72  Resp rate range: Resp:  [18] 18  Temp range: Temp (24hrs), Av.3 °F (36.8 °C), Min:98 °F (36.7 °C), Max:98.5 °F (36.9 °C)    O2 Device: nasal cannulaFlow (L/min):  [2.5-3] 3  Oxygen range:SpO2:  [72 %-99 %] 97 %   177 lb 6.4 oz (80.5 kg); Body mass index is 27.78 kg/(m^2).    Intake/Output Summary (Last 24 hours) at 17 1543  Last data filed at 17 1500   Gross per 24 hour   Intake              480 ml   Output             3800 ml   Net            -3320 ml       Physical Exam   Eyes: Conjunctivae are normal.   Cardiovascular: Normal rate and regular rhythm.    No murmur heard.  Pulmonary/Chest: No respiratory distress. She has decreased breath sounds. She has no wheezes. She has no rales.   Abdominal: Soft. Bowel sounds are normal. She exhibits no mass. There is tenderness.   Musculoskeletal: She exhibits no edema.   Neurological: She is alert. No sensory deficit.   Skin: Skin is warm. No rash noted.     Results Review:    I have reviewed the laboratory and imaging data since the last note by [facility] physician.  My annotations are noted in assessment and plan.    Medication Review:  I have reviewed the current MAR.  My annotations are noted in assessment and plan.      sodium chloride 30 mL/hr Last Rate: 30 mL/hr (17 1059)     Plan   PCCM Problems  COPD exacerbation  Underlying severe COPD  Chronic hypoxic respiratory failure, on nasal cannula  Chronic hypercapnic respiratory failure  Lung nodules with demonstrated stability on previous CT  Relevant Medical Diagnoses  GI bleed  Abdominal pain  Previous valve replacement  Chronic atrial  fibrillation    Plan of Treatment  No further wheezing on po pred, taper.    Continued complaints of abdominal pain especially in the lower quadrants.  Note plans for repeat assessment of SMA in 6 months.    Her oxygen levels are adequate.  She will follow-up with Dr. Melo who is her regular pulmonologist and will discuss Trilogy as an outpatient.    No objection to discharge.    Guanako Crenshaw MD  08/25/17  3:43 PM    Part of this note may be an electronic transcription/translation of spoken language to printed text using the Dragon Dictation System.

## 2017-08-25 NOTE — PLAN OF CARE
Problem: Patient Care Overview (Adult)  Goal: Plan of Care Review  Outcome: Ongoing (interventions implemented as appropriate)  Pt continues to complain of abdominal pain. Medicated x 1 thus far. Rested well after Restoril given. To have u/s in AM, pt NPO since midnight. Will continue to monitor.     08/25/17 0223   Coping/Psychosocial Response Interventions   Plan Of Care Reviewed With patient   Patient Care Overview   Progress progress towards functional goals is fair         Problem: Fall Risk (Adult)  Goal: Absence of Falls  Outcome: Ongoing (interventions implemented as appropriate)    Problem: Pain, Acute (Adult)  Goal: Acceptable Pain Control/Comfort Level  Outcome: Ongoing (interventions implemented as appropriate)    Problem: Respiratory Insufficiency (Adult)  Goal: Acid/Base Balance  Outcome: Ongoing (interventions implemented as appropriate)  Goal: Effective Ventilation  Outcome: Ongoing (interventions implemented as appropriate)

## 2017-08-25 NOTE — DISCHARGE SUMMARY
Date of Admission: 8/9/2017  Date of Discharge:  8/25/2017  Primary Care Physician: Javan Martinez MD     Discharge Diagnosis:  Active Hospital Problems (** Indicates Principal Problem)    Diagnosis Date Noted   • **Lower GI bleed [K92.2] 08/09/2017   • Acute on chronic diastolic congestive heart failure [I50.33] 08/25/2017   • Intertrigo [L30.4] 08/25/2017   • Celiac artery stenosis [I77.4] 08/24/2017   • Chronic respiratory failure with hypoxia [J96.11] 08/13/2017   • History of mitral valve replacement with tissue graft [Z95.4] 08/11/2017   • Anemia [D64.9] 08/09/2017   • Long term current use of anticoagulant [Z79.01] 10/13/2016   • Pulmonary hypertension [I27.2]    • Hypertension [I10]    • Chronic obstructive pulmonary disease with acute exacerbation [J44.1]    • PAF (paroxysmal atrial fibrillation) [I48.0] 01/25/2016      Resolved Hospital Problems    Diagnosis Date Noted Date Resolved   • VAN (acute kidney injury) [N17.9] 08/15/2017 08/23/2017   • Oral candidiasis [B37.0] 08/14/2017 08/23/2017   • Precordial pain [R07.2] 08/11/2017 08/23/2017       Presenting Problem/History of Present Illness:  Lower GI bleed [K92.2]     Hospital Course:  The patient is a 62 y.o. woman who presented with a lower GI bleed secondary to hemorrhoids and multiple polyps..  Hemoglobin continued to drop down to 8.0 despite not having any more bleeding episodes.  Coumadin was stopped.  Small bowel follow-through showed esophagitis; EGD showed gastritis and hiatal hernia.  Developed acute kidney injury secondary to hypovolemia and anemia.  Diuretics were held and she received 1 unit of packed red blood cells.  Lower quadrant abdominal pain persisted.  Repeat CT was done and showed advanced atherosclerotic calcification in the abdominal aorta with dilatation noted of the inferior vena cava.  50% luminal narrowing of celiac artery secondary to plaque.  Small nodule right anterior abdominal wall, possible injection granuloma.  She  was seen by general surgery and by vascular surgery.  Duplex study to be done today.  She will be followed in the vascular office.  She will remain off Coumadin and only be on aspirin at this time.  Dr. Crenshaw saw her for COPD exacerbation.  She has chronic hypoxic respiratory failure due to severe pulmonary hypertension and is on 3 L of oxygen at home.  She completed a course of IV steroids and now is on an oral steroid taper.  She had an exacerbation of chronic diastolic congestive heart failure.  She received 1 dose of IV Lasix on 8/23/17.  Oral Lasix was restarted yesterday.  Today her bicarbonate is up and her sodium level is up.  I discussed this with her.  Will decrease her home Lasix to once daily instead of twice a day.  I started her on nystatin powder today for intertrigo.  Pending results of the duplex study today she will be discharged home.    Stable condition; fair prognosis    Exam Today:  Feels better.  Would like to go home later today.  Abdominal pain is about the same.  Has been able to eat.  Vital signs noted.  No distress.  Heart is regular without murmur.  Lungs breath sounds are decreased and fairly clear.  An occasional crackle is heard.  Extremities no edema.  Abdomen is soft with minimal tenderness at the lower quadrants    Procedures Performed:  Procedure(s):  ESOPHAGOGASTRODUODENOSCOPY 8/17/17  Colonoscopy with polypectomy 8/10/17  CT abdomen pelvis without contrast 8/9/17  CT abdomen pelvis with contrast 8/23/17  Upper GI with small bowel follow-through 8/14/17     Labs  8/25/17: Glucose 98 BUN 18 creatinine 1.1 sodium 147 potassium 3 chloride 95 bicarbonate 40  8/25/17 white count 13 hemoglobin 9.5 platelets 322,000  Sputum culture normal juan daniel  Ferritin 51.  Iron low at 25, iron saturation low at 6.  TIBC and transferrin normal.  B12 892 RBC folate 1935.  TSH 3.8  Triglycerides 129 HDL 58 LDL 79    Consults:   Dr. Costa Lima  Claudia Lee    Discharge Disposition:  Home or Self Care    Discharge Medications:   Paz Browne   Home Medication Instructions LEXUS:[id number]    Printed on:08/25/17 2240   Medication Information                      albuterol (PROVENTIL HFA;VENTOLIN HFA) 108 (90 Base) MCG/ACT inhaler  Inhale 2 puffs Every 4 (Four) Hours As Needed for Wheezing.             albuterol (PROVENTIL) (2.5 MG/3ML) 0.083% nebulizer solution  Take 2.5 mg by nebulization Every 4 (Four) Hours As Needed for Wheezing.             aspirin 81 MG EC tablet  Take 1 tablet by mouth Daily.             atorvastatin (LIPITOR) 40 MG tablet  Take 1 tablet by mouth Daily.             calcium carbonate (TUMS) 500 MG chewable tablet  Chew 500 mg 2 (Two) Times a Day As Needed for Heartburn.             carvedilol (COREG) 6.25 MG tablet  Take 2 tablets by mouth 2 (Two) Times a Day With Meals.             cyclobenzaprine (FLEXERIL) 10 MG tablet  Take 10 mg by mouth Every Night.             ferrous sulfate 325 (65 FE) MG tablet  Take 1 tablet by mouth 2 (Two) Times a Day With Meals.             fluticasone-salmeterol (ADVAIR DISKUS) 250-50 MCG/DOSE DISKUS  Inhale 1 puff 2 (Two) Times a Day.             furosemide (LASIX) 40 MG tablet  Take 1 tablet by mouth Daily.             HYDROcodone-acetaminophen (NORCO) 5-325 MG per tablet  Take 1 tablet by mouth Every 4 (Four) Hours As Needed for Moderate Pain . Chronic pain medicine for low back pain             hydrocortisone (ANUSOL-HC) 25 MG suppository  Insert 1 suppository into the rectum 2 (Two) Times a Day As Needed for Hemorrhoids.             lisinopril (PRINIVIL,ZESTRIL) 5 MG tablet  Take 2 tablets by mouth Daily.             loratadine (CLARITIN) 10 MG tablet  Take 10 mg by mouth 2 (Two) Times a Day.             meclizine (ANTIVERT) 25 MG tablet  Take 25 mg by mouth 3 (Three) Times a Day As Needed for dizziness.             Multiple Vitamins-Minerals (MULTIVITAL) tablet  Take 1 tablet  by mouth Daily.             nystatin (MYCOSTATIN) 477334 UNIT/GM powder  Apply  topically Every 12 (Twelve) Hours. Apply to gaulded areas in groin until resolved             omeprazole (priLOSEC) 40 MG capsule  Take 1 capsule by mouth Daily.             polyethylene glycol (MIRALAX) packet  Take 17 g by mouth 2 (Two) Times a Day.             potassium chloride (K-DUR,KLOR-CON) 20 MEQ CR tablet  Take 1 tablet by mouth Daily.             predniSONE (DELTASONE) 10 MG tablet  Take 4 tabs daily x 3 days, then take 3 tabs daily x 3 days, then take 2 tabs daily x 3 days, then take 1 tab daily x 3 days             rOPINIRole (REQUIP) 2 MG tablet  Take 1 tablet by mouth Every Night.             sertraline (ZOLOFT) 100 MG tablet  Take 1 tablet by mouth Daily.             sodium chloride (OCEAN) 0.65 % nasal spray  2 sprays into each nostril As Needed for Congestion.             temazepam (RESTORIL) 30 MG capsule  Take 30 mg by mouth At Night As Needed for Sleep.             tiotropium (SPIRIVA HANDIHALER) 18 MCG per inhalation capsule  Place 1 capsule into inhaler and inhale Daily.                 Discharge Diet:   Diet Instructions     Diet: Cardiac, Specialty Diet; Thin Liquids, No Restrictions; Low Sodium       Discharge Diet:   Cardiac  Specialty Diet      Fluid Consistency:  Thin Liquids, No Restrictions   Specialty Diets:  Low Sodium                 Activity at Discharge:   Activity Instructions     Activity as Tolerated                     Follow-up Appointments:  Dr. Martinez in 1 week for anemia, COPD, heart failure    Future Appointments  Date Time Provider Department Center   9/15/2017 9:20 AM MD SUSANNAH Brantley CD [facility] None   10/10/2017 10:20 AM MD SUSANNAH Brantley [facility] None     Additional Instructions for the Follow-ups that You Need to Schedule     Referral to Home Health    As directed    BMP 8/28/17-call to Dr. Martinez   Face to Face Visit Date:  8/25/2017   Follow-up Provider for Plan of Care?:   I treated the patient in an acute care facility and will not continue treatment after discharge.   Follow-up Provider:  NAKIA LAINEZ   Reason/Clinical Findings:  Chronic respiratory failure; acute blood loss anemia; pulmonary hypertension; diastolic congestive heart failure   Describe mobility limitations that make leaving home difficult:  Same as above   Nursing/Therapeutic Services Requested:  Skilled Nursing   Skilled nursing orders:   COPD management  CHF management      Frequency:  1 Week 1                 Test Results Pending at Discharge: None       Dominique Ponce MD  08/25/17  9:15 AM    Time Spent on Discharge Activities: 40 minutes.  Discussed with patient and with the nurse.  Medical record reviewed

## 2017-08-25 NOTE — PROGRESS NOTES
[facility]  Inpatient Progress Note    Reason for Follow Up:  Abdominal pain, GI bleeding    Subjective     Interval History:   No overt GI bleeding. Abdominal pain periumbilical and LLQ unchanged. Mesenteric duplex ordered.        Current Facility-Administered Medications:   •  acetaminophen (TYLENOL) tablet 650 mg, 650 mg, Oral, Q6H PRN, Tata Newby MD  •  albuterol (PROVENTIL) nebulizer solution 0.083% 2.5 mg/3mL, 2.5 mg, Nebulization, Q4H PRN, Nick Tovar MD, 2.5 mg at 08/19/17 0410  •  aspirin EC tablet 81 mg, 81 mg, Oral, Daily, Earnest Gan MD, 81 mg at 08/25/17 0808  •  atorvastatin (LIPITOR) tablet 40 mg, 40 mg, Oral, Daily, Tata Newby MD, 40 mg at 08/25/17 0808  •  bisacodyl (DULCOLAX) suppository 10 mg, 10 mg, Rectal, Daily PRN, Tata Newby MD  •  budesonide-formoterol (SYMBICORT) 160-4.5 MCG/ACT inhaler 2 puff, 2 puff, Inhalation, BID - RT, Tata Newby MD, 2 puff at 08/25/17 0715  •  calcium carbonate (TUMS) chewable tablet 500 mg (200 mg elemental), 1 tablet, Oral, BID PRN, Tata Newby MD  •  carvedilol (COREG) tablet 12.5 mg, 12.5 mg, Oral, BID With Meals, Kirti Arteaga MD, 12.5 mg at 08/25/17 0808  •  cetirizine (zyrTEC) tablet 10 mg, 10 mg, Oral, Daily, Tata Newby MD, 10 mg at 08/25/17 0808  •  cyclobenzaprine (FLEXERIL) tablet 10 mg, 10 mg, Oral, Nightly, Tata Newby MD, 10 mg at 08/24/17 2035  •  ferrous sulfate tablet 325 mg, 325 mg, Oral, BID With Meals, Cleveland Devine MD, 325 mg at 08/25/17 0808  •  guaiFENesin (MUCINEX) 12 hr tablet 600 mg, 600 mg, Oral, Q12H, Nick Tovar MD, 600 mg at 08/25/17 0808  •  hydrALAZINE (APRESOLINE) tablet 25 mg, 25 mg, Oral, Q6H PRN, Sonny Burroughs MD, 25 mg at 08/25/17 0513  •  HYDROcodone-acetaminophen (NORCO) 5-325 MG per tablet 1 tablet, 1 tablet, Oral, Q4H PRN, Nick Tovar MD, 1 tablet at 08/25/17 0808  •  hydrocortisone  (ANUSOL-HC) suppository 25 mg, 25 mg, Rectal, BID, Earnest PALOMO MD, 25 mg at 17 0815  •  hyoscyamine (LEVSIN) SL tablet 125 mcg, 125 mcg, Sublingual, Q4H PRN, Montserrat Newby, APRN, 125 mcg at 17 1107  •  ipratropium-albuterol (DUO-NEB) nebulizer solution 3 mL, 3 mL, Nebulization, Q4H - RT, Guanako Crenshaw MD, 3 mL at 17 0715  •  lisinopril (PRINIVIL,ZESTRIL) tablet 10 mg, 10 mg, Oral, Q24H, Earnest Gan MD, 10 mg at 17 0356  •  lisinopril (PRINIVIL,ZESTRIL) tablet 5 mg, 5 mg, Oral, Once, Earnest Gan MD  •  magnesium hydroxide (MILK OF MAGNESIA) suspension 2400 mg/10mL 10 mL, 10 mL, Oral, Daily PRN, Tata Newby MD  •  meclizine (ANTIVERT) tablet 25 mg, 25 mg, Oral, TID PRN, Tata Newby MD  •  Morphine sulfate (PF) injection 1 mg, 1 mg, Intravenous, Q4H PRN, Nick Tovar MD, 1 mg at 17 0402  •  multivitamin with minerals 1 tablet, 1 tablet, Oral, Daily, Tata Newby MD, 1 tablet at 17 0808  •  [] Morphine sulfate (PF) injection 2 mg, 2 mg, Intravenous, Q4H PRN, 2 mg at 17 1459 **AND** naloxone (NARCAN) injection 0.4 mg, 0.4 mg, Intravenous, Q5 Min PRN, Tata Newby MD  •  nystatin (MYCOSTATIN) powder, , Topical, Q12H, Dominique Ponce MD  •  ondansetron (ZOFRAN) injection 4 mg, 4 mg, Intravenous, Q6H PRN, Tata Newby MD, 4 mg at 17 2232  •  pantoprazole (PROTONIX) EC tablet 40 mg, 40 mg, Oral, QAM, Tata Newby MD, 40 mg at 17  •  polyethylene glycol (MIRALAX) powder 17 g, 17 g, Oral, BID, BA Vaca  •  potassium chloride (MICRO-K) CR capsule 40 mEq, 40 mEq, Oral, Once, Dominique Ponce MD  •  predniSONE (DELTASONE) tablet 40 mg, 40 mg, Oral, Daily With Breakfast, Guanako Crenshaw MD, 40 mg at 17  •  rOPINIRole (REQUIP) tablet 2 mg, 2 mg, Oral, Nightly, Tata Newby MD, 2 mg at 17  •  sertraline (ZOLOFT) tablet 100 mg, 100 mg,  Oral, Daily, Tata Newby MD, 100 mg at 08/25/17 0807  •  sodium chloride (OCEAN) nasal spray 2 spray, 2 spray, Each Nare, PRN, Leonel Ruiz MD  •  sodium chloride 0.9 % flush 1-10 mL, 1-10 mL, Intravenous, PRN, Tata Newby MD  •  sodium chloride 0.9 % flush 10 mL, 10 mL, Intravenous, PRN, Amrit Reinoso MD  •  sodium chloride 0.9 % infusion, 30 mL/hr, Intravenous, Continuous PRN, Porsha Ruby MD, Last Rate: 30 mL/hr at 08/17/17 1059, 30 mL/hr at 08/17/17 1059  •  temazepam (RESTORIL) capsule 30 mg, 30 mg, Oral, Nightly PRN, Tata Newby MD, 30 mg at 08/24/17 2207  Review of Systems:    She has lower abdominal cramping with movement, all other systems reviewed and negative    Objective     Vital Signs  Temp:  [97.9 °F (36.6 °C)-98.5 °F (36.9 °C)] 98.2 °F (36.8 °C)  Heart Rate:  [68-79] 69  Resp:  [18] 18  BP: (169-193)/(61-99) 172/77  Body mass index is 27.78 kg/(m^2).    Intake/Output Summary (Last 24 hours) at 08/25/17 0921  Last data filed at 08/25/17 0559   Gross per 24 hour   Intake              360 ml   Output             2400 ml   Net            -2040 ml           Physical Exam:   General: patient awake, alert and cooperative   Eyes: Normal lids and lashes, no scleral icterus   Neck: supple, normal ROM   Skin: warm and dry, not jaundiced   Cardiovascular: regular rhythm and rate, no murmurs auscultated   Pulm: clear to auscultation bilaterally, regular and unlabored   Abdomen: soft, tender LLQ and periumbilical, nondistended; normal bowel sounds   Rectal: deferred   Extremities: no rash or edema   Psychiatric: Normal mood and behavior; memory intact     Results Review:     I reviewed the patient's new clinical results.      Results from last 7 days  Lab Units 08/25/17  0452 08/24/17  0431 08/23/17  0530   WBC 10*3/mm3 12.99* 9.76 8.55   HEMOGLOBIN g/dL 9.5* 8.9* 8.5*   HEMATOCRIT % 31.8* 29.6* 28.2*   PLATELETS 10*3/mm3 322 117 278       Results from last 7  days  Lab Units 08/25/17  0452 08/24/17  0431 08/23/17  0530  08/19/17  0440   SODIUM mmol/L 147* 142 141  < > 140   POTASSIUM mmol/L 3.0* 3.7 3.9  < > 3.5   CHLORIDE mmol/L 95* 95* 98  < > 98   CO2 mmol/L 39.8* 36.2* 34.4*  < > 26.9   BUN mg/dL 18 16 14  < > 16   CREATININE mg/dL 1.12* 0.96 0.96  < > 0.90   CALCIUM mg/dL 9.0 9.4 8.9  < > 9.3   BILIRUBIN mg/dL  --  0.7  --   --  0.7   ALK PHOS U/L  --  75  --   --  99   ALT (SGPT) U/L  --  31  --   --  46*   AST (SGOT) U/L  --  26  --   --  39*   GLUCOSE mg/dL 98 133* 128*  < > 111*   < > = values in this interval not displayed.    Results from last 7 days  Lab Units 08/21/17  0451 08/20/17  0431 08/19/17  0440   INR  1.84* 2.08* 2.22*       Lab Results  Lab Value Date/Time   LIPASE 46 08/13/2017 0449       Radiology:  CT Abdomen Pelvis With Contrast   Final Result   1. Small sliding hernia. Wall thickening of the gastric fundus which   could be due to underdistention. Recommend upper GI endoscopy to   evaluate if not recently performed.   2. Pelvic floor weakening.   3. Pulmonary emphysema.       This report was finalized on 8/24/2017 1:22 PM by Dr. Nellie Landaverde MD.          XR Abdomen KUB   Final Result      XR Abdomen KUB   Final Result      XR Chest 2 View   Final Result   There are chronic parenchymal changes in the lungs. There   has been sternotomy. The cardiac silhouette is enlarged and the   pulmonary vasculature appears more prominent than on some of the recent   prior exams, suggesting there may be an element of heart failure or   volume overload. However, there is no pulmonary edema nor is there any   infiltrate.       This report was finalized on 8/21/2017 8:53 PM by Dr. Akbar Kumar MD.          XR Abdomen KUB   Final Result   A substantial volume of contrast material remains within the   colon.       This report was finalized on 8/21/2017 8:53 PM by Dr. Akbar Kumar MD.          XR Abdomen KUB   Final Result      XR Chest PA & Lateral    Final Result      FL Upper GI With Air Contrast & SBFT   Final Result      XR Chest PA & Lateral   Final Result      CT Abdomen Pelvis Without Contrast   Final Result   1. No evidence for acute intra-abdominal process.   2. Basilar pulmonary emphysema.   3. Hiatal hernia. Atherosclerotic disease. Previous cholecystectomy and   hysterectomy.       This report was finalized on 8/9/2017 3:26 PM by Dr. Magdi Greer MD.              Assessment/Plan     Patient Active Problem List   Diagnosis   • PAF (paroxysmal atrial fibrillation)   • Hypertension   • Hyperlipidemia   • Chronic obstructive pulmonary disease with acute exacerbation   • Pain medication agreement   • Hiatal hernia   • Primary osteoarthritis involving multiple joints   • Pulmonary hypertension   • S/P TVR (tricuspid valve repair)   • Long term current use of anticoagulant   • Pulmonary nodule   • Hemoptysis   • RLS (restless legs syndrome)   • Chronic low back pain   • Dysplastic polyp of colon   • Lower GI bleed   • History of mitral valve replacement with tissue graft   • Chronic respiratory failure with hypoxia   • Anemia   • Celiac artery stenosis   • Acute on chronic diastolic congestive heart failure   • Intertrigo   Impression:  #1 Lower GI bleeding: resolved. Hgb stable in mid 8 range. Onset in the postprocedural setting s/p colonoscopy with bleeding internal hemorrhoids. Congested mucosa in transverse colon and clips placed on 8/4/17.   #2 anticoagulant therapy: Coumadin stopped 8/15 per cards due to continued drop in Hgb, OK with GI for asa with plans to remain off AC  #3 Abdominal discomfort- SBFT with no obstructive process, evidence of esophagitis and mild stenosis. EGD 8/17/17 with gastritis.    #4 Mesenteric vascular atherosclerosis- per CT A/P. mesenteric duplex ordered with vascular surgery now following.   #5 ANNA- oral iron therapy initiated. H&H stable      Recommendations:  - Appreciate surgery and vascular assistance  -continue  PPI  -Await results of mesenteric duplex   - Continue antispasmodic PRN  - Discussed with Dr. Ponce, patient to be discharged after Duplex.   - Pt to call office for appt in 4-6 weeks with Dr Devine.   - GI will sign off and see her in the office.     BA Vaca

## 2017-08-25 NOTE — PROGRESS NOTES
Date of Admission:  8/9/2017  Jonathan Lee MD       LOS: 16 days   Patient Care Team:  Javan Martinez MD as PCP - General  Javan Martinez MD as PCP - Family Medicine    Subjective     62 y.o. female stable left lower quadrant abdominal pain.  No rebound or guarding.  Tolerating diet.  Once discharge from the hospital.  No chest pain or shortness of breath.    Review of Systems    Objective     Vital Signs  Vital Signs Patient Vitals for the past 24 hrs:   BP Temp Temp src Pulse Resp SpO2 Weight   08/25/17 0559 175/73 - - - - - 177 lb 6.4 oz (80.5 kg)   08/25/17 0355 (!) 193/99 - - - - - -   08/25/17 0337 - - - 74 18 99 % -   08/24/17 2304 - - - 74 18 97 % -   08/24/17 2300 173/74 98 °F (36.7 °C) Oral 77 18 95 % -   08/24/17 1959 - - - 75 18 99 % -   08/24/17 1900 178/61 98.5 °F (36.9 °C) Oral 79 18 91 % -   08/24/17 1544 170/66 98.3 °F (36.8 °C) Oral - 18 (!) 72 % -   08/24/17 1502 - - - 73 18 - -   08/24/17 1209 169/75 97.9 °F (36.6 °C) Oral 74 18 - -   08/24/17 1041 - - - 77 18 98 % -   08/24/17 0714 (!) 183/89 97.9 °F (36.6 °C) Oral 74 16 96 % -     I/O:  I/O last 3 completed shifts:  In: 1440 [P.O.:1440]  Out: 4350 [Urine:4350]    Physical Exam:  Physical Exam   Constitutional: She is oriented to person, place, and time. She appears well-developed.   Pulmonary/Chest: Effort normal. No respiratory distress.   Abdominal: Soft. She exhibits no distension.   Neurological: She is alert and oriented to person, place, and time.       Results Review:     CBC    Results from last 7 days  Lab Units 08/25/17  0452 08/24/17  0431 08/23/17  0530 08/22/17  0445 08/21/17  0451 08/20/17  0431 08/19/17  0440   WBC 10*3/mm3 12.99* 9.76 8.55 9.91 10.28 9.09 7.63   HEMOGLOBIN g/dL 9.5* 8.9* 8.5* 8.4* 9.0* 9.8* 8.8*   PLATELETS 10*3/mm3 322 289 278 273 276 271 198     BMP   Results from last 7 days  Lab Units 08/25/17  0452 08/24/17  0431 08/23/17  0530 08/22/17  0445 08/21/17  0451 08/20/17  0431 08/19/17  0440   SODIUM  mmol/L 147* 142 141 140 143 139 140   POTASSIUM mmol/L 3.0* 3.7 3.9 4.2 3.2* 3.9 3.5   CHLORIDE mmol/L 95* 95* 98 98 98 96* 98   CO2 mmol/L 39.8* 36.2* 34.4* 31.1* 32.2* 25.0 26.9   BUN mg/dL 18 16 14 12 12 12 16   CREATININE mg/dL 1.12* 0.96 0.96 0.90 0.88 0.86 0.90   GLUCOSE mg/dL 98 133* 128* 129* 139* 145* 111*     Cr Clearance Estimated Creatinine Clearance: 55.4 mL/min (by C-G formula based on Cr of 1.12).  Coag   Results from last 7 days  Lab Units 08/21/17  0451 08/20/17  0431 08/19/17  0440   INR  1.84* 2.08* 2.22*     HbA1C   Lab Results   Component Value Date    HGBA1C 5.7 (H) 09/01/2014     Blood Glucose No results found for: POCGLU  Micro   Results from last 7 days  Lab Units 08/21/17  2048   RESPCX  Moderate growth (3+) Normal Respiratory Lesly   GRAM STAIN RESULT  Moderate (3+) WBCs seen  Moderate (3+) Epithelial cells per low power field  Mixed bacterial morphotypes seen on Gram Stain       Assessment/Plan     Assessment & Plan    Principal Problem:    Anemia  Active Problems:    PAF (paroxysmal atrial fibrillation)    Hypertension    COPD (chronic obstructive pulmonary disease)    Pulmonary hypertension    Long term current use of anticoagulant    Lower GI bleed    History of mitral valve replacement with tissue graft    Chronic respiratory failure with hypoxia    Celiac artery stenosis      62 y.o. female incidentally noted celiac artery stenosis on CT scan.  I leave this to be an asymptomatic lesion.  Her mesenteric duplex performed today does show a stenosis that is significant fair.  Her SMA is widely patent without evidence of stenosis.  She also has a patent IRINA.  I'd like her to follow up with me in 6 months time with a repeat mesenteric duplex to follow her asymptomatic mesenteric vascular disease.    Follow-up mesenteric duplex today.    Jonathan Lee MD  08/25/17  6:22 AM    Please call my office with any question: [phone]    Active Hospital Problems (** Indicates Principal  Problem)    Diagnosis Date Noted   • **Anemia [D64.9] 08/09/2017   • Celiac artery stenosis [I77.4] 08/24/2017   • Chronic respiratory failure with hypoxia [J96.11] 08/13/2017   • History of mitral valve replacement with tissue graft [Z95.4] 08/11/2017   • Lower GI bleed [K92.2] 08/09/2017   • Long term current use of anticoagulant [Z79.01] 10/13/2016   • Pulmonary hypertension [I27.2]    • Hypertension [I10]    • COPD (chronic obstructive pulmonary disease) [J44.9]    • PAF (paroxysmal atrial fibrillation) [I48.0] 01/25/2016      Resolved Hospital Problems    Diagnosis Date Noted Date Resolved   • VAN (acute kidney injury) [N17.9] 08/15/2017 08/23/2017   • Oral candidiasis [B37.0] 08/14/2017 08/23/2017   • Precordial pain [R07.2] 08/11/2017 08/23/2017

## 2017-08-28 NOTE — PROGRESS NOTES
Continued Stay Note  [facility]     Patient Name: Paz Browne  MRN: [id number]  Today's Date: 8/28/2017    Admit Date: 8/9/2017          Discharge Plan       08/28/17 0929    Final Note    Final Note discharged home with formerly [facility], confirmed with Hank              Discharge Codes       08/28/17 0929    Discharge Codes    Discharge Codes 06  Discharged/transferred to home under care of organized home health service organization in anticipation of skilled care        Expected Discharge Date and Time     Expected Discharge Date Expected Discharge Time    Aug 25, 2017             Beverly Meza

## 2017-08-30 ENCOUNTER — TELEPHONE (OUTPATIENT)
Dept: FAMILY MEDICINE CLINIC | Facility: CLINIC | Age: 62
End: 2017-08-30

## 2017-08-30 NOTE — TELEPHONE ENCOUNTER
PT HAS APT 13 AND HER HEMOGLOBIN IS HIGH AND THE PT IS TALKING LIKE SHE IS GOING TO DIE , TELLING FAMILY TO COME SEE HER AND MAKING ARRANGEMENTS FOR HER DEATH,CARE GIVER IS REALLY CONCERNED AND WANTED TO KNOW IF SHE CAN COME IN SOONER

## 2017-09-07 ENCOUNTER — OFFICE VISIT (OUTPATIENT)
Dept: FAMILY MEDICINE CLINIC | Facility: CLINIC | Age: 62
End: 2017-09-07

## 2017-09-07 VITALS
DIASTOLIC BLOOD PRESSURE: 68 MMHG | TEMPERATURE: 98.6 F | OXYGEN SATURATION: 98 % | HEIGHT: 67 IN | BODY MASS INDEX: 26.06 KG/M2 | WEIGHT: 166 LBS | HEART RATE: 80 BPM | SYSTOLIC BLOOD PRESSURE: 112 MMHG

## 2017-09-07 DIAGNOSIS — K92.2 GASTROINTESTINAL HEMORRHAGE, UNSPECIFIED GASTROINTESTINAL HEMORRHAGE TYPE: Primary | ICD-10-CM

## 2017-09-07 DIAGNOSIS — I10 ESSENTIAL HYPERTENSION: ICD-10-CM

## 2017-09-07 DIAGNOSIS — J44.1 CHRONIC OBSTRUCTIVE PULMONARY DISEASE WITH ACUTE EXACERBATION (HCC): ICD-10-CM

## 2017-09-07 DIAGNOSIS — E78.2 MIXED HYPERLIPIDEMIA: ICD-10-CM

## 2017-09-07 LAB
ALBUMIN SERPL-MCNC: 4.5 G/DL (ref 3.5–5.2)
ALBUMIN/GLOB SERPL: 1.4 G/DL
ALP SERPL-CCNC: 84 U/L (ref 39–117)
ALT SERPL W P-5'-P-CCNC: 17 U/L (ref 1–33)
ANION GAP SERPL CALCULATED.3IONS-SCNC: 15.2 MMOL/L
AST SERPL-CCNC: 23 U/L (ref 1–32)
BILIRUB SERPL-MCNC: 0.6 MG/DL (ref 0.1–1.2)
BUN BLD-MCNC: 26 MG/DL (ref 8–23)
BUN/CREAT SERPL: 17.9 (ref 7–25)
CALCIUM SPEC-SCNC: 10.6 MG/DL (ref 8.6–10.5)
CHLORIDE SERPL-SCNC: 93 MMOL/L (ref 98–107)
CO2 SERPL-SCNC: 28.8 MMOL/L (ref 22–29)
CREAT BLD-MCNC: 1.45 MG/DL (ref 0.57–1)
ERYTHROCYTE [DISTWIDTH] IN BLOOD BY AUTOMATED COUNT: 16.1 % (ref 4.5–15)
FERRITIN SERPL-MCNC: 85.27 NG/ML (ref 13–150)
GFR SERPL CREATININE-BSD FRML MDRD: 36 ML/MIN/1.73
GLOBULIN UR ELPH-MCNC: 3.3 GM/DL
GLUCOSE BLD-MCNC: 95 MG/DL (ref 65–99)
HCT VFR BLD AUTO: 37 % (ref 31–42)
HGB BLD-MCNC: 12.1 G/DL (ref 12–18)
LYMPHOCYTES # BLD AUTO: 2 10*3/MM3 (ref 1.2–3.4)
LYMPHOCYTES NFR BLD AUTO: 11.8 % (ref 21–51)
MCH RBC QN AUTO: 27.6 PG (ref 26.1–33.1)
MCHC RBC AUTO-ENTMCNC: 32.8 G/DL (ref 33–37)
MCV RBC AUTO: 84.2 FL (ref 80–99)
MONOCYTES # BLD AUTO: 0.6 10*3/MM3 (ref 0.1–0.6)
MONOCYTES NFR BLD AUTO: 3.5 % (ref 2–9)
NEUTROPHILS # BLD AUTO: 14.2 10*3/MM3 (ref 1.4–6.5)
NEUTROPHILS NFR BLD AUTO: 84.7 % (ref 42–75)
PLATELET # BLD AUTO: 269 10*3/MM3 (ref 150–450)
PMV BLD AUTO: 8.4 FL (ref 7.1–10.5)
POTASSIUM BLD-SCNC: 3.7 MMOL/L (ref 3.5–5.2)
PROT SERPL-MCNC: 7.8 G/DL (ref 6–8.5)
RBC # BLD AUTO: 4.39 10*6/MM3 (ref 4–6)
SODIUM BLD-SCNC: 137 MMOL/L (ref 136–145)
WBC NRBC COR # BLD: 16.8 10*3/MM3 (ref 4.5–10)

## 2017-09-07 PROCEDURE — 36415 COLL VENOUS BLD VENIPUNCTURE: CPT | Performed by: INTERNAL MEDICINE

## 2017-09-07 PROCEDURE — 85025 COMPLETE CBC W/AUTO DIFF WBC: CPT | Performed by: INTERNAL MEDICINE

## 2017-09-07 PROCEDURE — 82728 ASSAY OF FERRITIN: CPT | Performed by: INTERNAL MEDICINE

## 2017-09-07 PROCEDURE — 80053 COMPREHEN METABOLIC PANEL: CPT | Performed by: INTERNAL MEDICINE

## 2017-09-07 PROCEDURE — 99214 OFFICE O/P EST MOD 30 MIN: CPT | Performed by: INTERNAL MEDICINE

## 2017-09-07 NOTE — PROGRESS NOTES
Subjective   Paz Browne is a 62 y.o. female.     History of Present Illness   Patient was seen today for GI bleed.  She was admitted to the hospital and had several units of blood.  Etiology of the bleeding was undetermined and she is still following up with GI.  Blood pressures been stable in the 120s over 80s.  Her lipid status been well-controlled with diet and medication.  Her COPD is also been well-controlled with her inhalers.  Patient's hemoglobin is relevant to 12.0 and she will follow-up in one month.    Much of this encounter note is an electronic transcription/translation of spoken language to printed text.  The electronic translation of spoken language may permit erroneous, or at times, nonsensical words or phrases to be inadvertently transcribed.  Although I have reviewed the note for such errors, some may still exist.  The following portions of the patient's history were reviewed and updated as appropriate: allergies, current medications, past family history, past medical history, past social history, past surgical history and problem list.    Review of Systems   Constitutional: Negative for fatigue and fever.   HENT: Positive for congestion. Negative for trouble swallowing.    Eyes: Negative for discharge and visual disturbance.   Respiratory: Negative for choking and shortness of breath.    Cardiovascular: Negative for chest pain and palpitations.   Gastrointestinal: Negative for abdominal pain and blood in stool.   Endocrine: Negative.    Genitourinary: Negative for genital sores and hematuria.   Musculoskeletal: Negative for gait problem and joint swelling.   Skin: Negative for color change, pallor, rash and wound.   Allergic/Immunologic: Positive for environmental allergies. Negative for immunocompromised state.   Neurological: Negative for facial asymmetry and speech difficulty.   Psychiatric/Behavioral: Negative for hallucinations and suicidal ideas.       Objective   Physical Exam    Constitutional: She is oriented to person, place, and time. She appears well-developed and well-nourished.   HENT:   Head: Normocephalic.   Eyes: Conjunctivae are normal. Pupils are equal, round, and reactive to light.   Neck: Normal range of motion. Neck supple.   Cardiovascular: Normal rate, regular rhythm and normal heart sounds.    Pulmonary/Chest: Effort normal and breath sounds normal.   Abdominal: Soft. Bowel sounds are normal.   Musculoskeletal: Normal range of motion.   Neurological: She is alert and oriented to person, place, and time.   Skin: Skin is warm and dry.   Psychiatric: She has a normal mood and affect. Her behavior is normal. Judgment and thought content normal.   Nursing note and vitals reviewed.      Assessment/Plan   Problems Addressed this Visit        Cardiovascular and Mediastinum    Hypertension    Hyperlipidemia       Respiratory    Chronic obstructive pulmonary disease with acute exacerbation    Relevant Medications    tiotropium (SPIRIVA HANDIHALER) 18 MCG per inhalation capsule    fluticasone-salmeterol (ADVAIR DISKUS) 250-50 MCG/DOSE DISKUS      Other Visit Diagnoses     Gastrointestinal hemorrhage, unspecified gastrointestinal hemorrhage type    -  Primary    Relevant Orders    CBC & Differential (Completed)    Ferritin    Comprehensive Metabolic Panel    CBC Auto Differential (Completed)

## 2017-09-19 RX ORDER — POTASSIUM CHLORIDE 20 MEQ/1
20 TABLET, EXTENDED RELEASE ORAL DAILY
Qty: 90 TABLET | Refills: 1 | Status: SHIPPED | OUTPATIENT
Start: 2017-09-19 | End: 2017-09-27 | Stop reason: SDUPTHER

## 2017-09-19 RX ORDER — LISINOPRIL 5 MG/1
TABLET ORAL
Qty: 90 TABLET | Refills: 3 | Status: SHIPPED | OUTPATIENT
Start: 2017-09-19 | End: 2017-09-27 | Stop reason: SDUPTHER

## 2017-09-21 ENCOUNTER — TELEPHONE (OUTPATIENT)
Dept: FAMILY MEDICINE CLINIC | Facility: CLINIC | Age: 62
End: 2017-09-21

## 2017-09-21 RX ORDER — FERROUS SULFATE 325(65) MG
325 TABLET ORAL 2 TIMES DAILY WITH MEALS
Qty: 60 TABLET | Refills: 3 | Status: SHIPPED | OUTPATIENT
Start: 2017-09-21 | End: 2017-10-17 | Stop reason: SDUPTHER

## 2017-09-25 RX ORDER — HYDROCODONE BITARTRATE AND ACETAMINOPHEN 5; 325 MG/1; MG/1
1 TABLET ORAL EVERY 4 HOURS PRN
Qty: 60 TABLET | Refills: 0 | Status: SHIPPED | OUTPATIENT
Start: 2017-09-25 | End: 2017-10-26 | Stop reason: SDUPTHER

## 2017-09-27 ENCOUNTER — OFFICE VISIT (OUTPATIENT)
Dept: CARDIOLOGY | Facility: CLINIC | Age: 62
End: 2017-09-27

## 2017-09-27 VITALS
HEIGHT: 67 IN | HEART RATE: 78 BPM | BODY MASS INDEX: 25.74 KG/M2 | WEIGHT: 164 LBS | SYSTOLIC BLOOD PRESSURE: 122 MMHG | DIASTOLIC BLOOD PRESSURE: 64 MMHG

## 2017-09-27 DIAGNOSIS — Z98.890 S/P TVR (TRICUSPID VALVE REPAIR): ICD-10-CM

## 2017-09-27 DIAGNOSIS — I27.20 PULMONARY HYPERTENSION (HCC): ICD-10-CM

## 2017-09-27 DIAGNOSIS — I48.0 PAROXYSMAL ATRIAL FIBRILLATION (HCC): ICD-10-CM

## 2017-09-27 DIAGNOSIS — Z95.2 S/P MVR (MITRAL VALVE REPLACEMENT): Primary | ICD-10-CM

## 2017-09-27 DIAGNOSIS — I10 ESSENTIAL HYPERTENSION: ICD-10-CM

## 2017-09-27 PROCEDURE — 93000 ELECTROCARDIOGRAM COMPLETE: CPT | Performed by: INTERNAL MEDICINE

## 2017-09-27 PROCEDURE — 99213 OFFICE O/P EST LOW 20 MIN: CPT | Performed by: INTERNAL MEDICINE

## 2017-09-27 RX ORDER — POTASSIUM CHLORIDE 20 MEQ/1
20 TABLET, EXTENDED RELEASE ORAL DAILY
Qty: 90 TABLET | Refills: 1 | Status: ON HOLD | OUTPATIENT
Start: 2017-09-27 | End: 2018-01-04

## 2017-09-27 RX ORDER — FUROSEMIDE 40 MG/1
40 TABLET ORAL 2 TIMES DAILY
Qty: 180 TABLET | Refills: 1 | Status: SHIPPED | OUTPATIENT
Start: 2017-09-27 | End: 2018-04-01 | Stop reason: SDUPTHER

## 2017-09-27 RX ORDER — LISINOPRIL 5 MG/1
TABLET ORAL
Qty: 90 TABLET | Refills: 1 | Status: SHIPPED | OUTPATIENT
Start: 2017-09-27 | End: 2019-01-14 | Stop reason: SDUPTHER

## 2017-09-27 RX ORDER — ROPINIROLE 2 MG/1
2 TABLET, FILM COATED ORAL NIGHTLY
Qty: 90 TABLET | Refills: 3 | Status: SHIPPED | OUTPATIENT
Start: 2017-09-27 | End: 2018-01-23

## 2017-09-27 RX ORDER — ATORVASTATIN CALCIUM 40 MG/1
40 TABLET, FILM COATED ORAL DAILY
Qty: 90 TABLET | Refills: 3 | Status: SHIPPED | OUTPATIENT
Start: 2017-09-27 | End: 2018-10-06 | Stop reason: SDUPTHER

## 2017-09-27 NOTE — PROGRESS NOTES
Date of Office Visit: 17  Encounter Provider: Earnest Gan MD  Place of Service: [facility] CARDIOLOGY  Patient Name: Paz Browne  :1955      No chief complaint on file.    History of Present Illness  HPI Comments:  The patient is a pleasant, 61-year-old female with a history of previous valvular disease  but then had been lost to follow-up for quite some time.  She had been in the hospital  with progressive shortness of breath in 2014 and was found to be in rapid atrial  fibrillation as well as congestive heart failure.  She had an echocardiogram that showed  normal left ventricular systolic function.  The left atrium was severely dilated.  The  right ventricle was moderately enlarged.  She had severe mitral and severe tricuspid  insufficiency with severe pulmonary hypertension.  She was diuresed and rate controlled.   She then underwent transesophageal echocardiogram that confirmed that.  She then had a  cardiac catheterization, which showed no significant coronary disease but severer mitral  insufficiency.  She underwent urgent mitral valve repair using a #26 Physio-II ring  annuloplasty, which was unsuccessful and had to be replaced with a #31 mm porcine [employer]  Epic prosthesis.  She had tricuspid valve repair.  She also had right and left CryoMaze  procedure and closure of the atrial appendage.  She did well postoperatively and had some  transient renal insufficiency.  She had some junctional rhythm so she was not really  started on antiarrhythmics and was bradycardic, but she went back into atrial  fibrillation.  She was never really in sinus rhythm, so she was started on warfarin.   Since then, she then presented to our arrhythmia clinic on 10/02/2014, with palpitations,  tachycardia, and more shortness of breath.  She appeared to be in atrial flutter.  We  upped her carvedilol, but she continued to have problems so we started her on amiodarone.  She  was then adequately anticoagulated and then underwent electrocardioversion in 10/2014.  We then saw her June 2016 with progressive dyspnea.  She was admitted to the hospital.  She had an echocardiogram that showed normal left her systolic function her mitral valve replacement and tricuspid valve repair appeared normal she had marked pulmonary hypertension.  She had marked diffuse T-wave inversion in her precordial leads.  Her BNP was just mildly elevated.  She underwent cardiac catheterization which showed normal coronary arteries, right and left sided filling pressures were normal but she had moderate pulmonary hypertension.  Her wedge pressure was normal she did not respond vaso-dilators.  She was seen by Dr. Melo of pulmonary and was felt that her issues were predominantly due to her underlying lung disease.    She was admitted also in August 2017 with GI bleeding.  Status post polypectomy but also had hemorrhoids and some gastritis.  We took her off her warfarin and every time we will try to restart her hematoma and will go down again so since she remained in sinus rhythm we decided to keep her off warfarin.  She now comes in for follow-up.  She's had some improvement of her hemoglobin since she's been home she still has black stool but thinks it's from the iron.  She did short of breath with fairly minimal activity but she still able to take care of her great grandbaby.  Denies orthopnea or PND gets occasional palpitations but no near-syncope or syncope.    Congestive Heart Failure   Pertinent negatives include no abdominal pain, muscle weakness or nocturia.   Atrial Fibrillation   Symptoms are negative for dizziness and weakness. Past medical history includes atrial fibrillation and CHF.   Shortness of Breath   Associated symptoms include headaches. Pertinent negatives include no abdominal pain, fever, rash or vomiting.         Past Medical History:   Diagnosis Date   • Anemia    • Atrial fibrillation      Fani procedure   • Atrial flutter     cardioversion   • Celiac artery stenosis    • CHF (congestive heart failure)    • Chronic respiratory failure with hypoxia    • Colon polyp    • COPD (chronic obstructive pulmonary disease)    • Fall    • GI bleed    • History of mitral valve replacement with tissue graft    • Hyperlipidemia    • Hypertension    • Intertrigo    • Long term (current) use of anticoagulants    • Mitral regurgitation    • Palpitations    • Pulmonary hypertension    • Renal failure    • Tachycardia    • Valvular heart disease          Past Surgical History:   Procedure Laterality Date   • CARDIAC CATHETERIZATION  09/01/2014    Right dominant systemt, normal coronary arteries.    • CARDIAC CATHETERIZATION Left 6/10/2016    Procedure: Cardiac catheterization;  Surgeon: Sergei Hall MD;  Location: [facility] CATH INVASIVE LOCATION;  Service:    • CARDIAC CATHETERIZATION N/A 6/10/2016    Procedure: Right Heart Cath;  Surgeon: Sergei Hall MD;  Location: [facility] CATH INVASIVE LOCATION;  Service:    • COLONOSCOPY     • COLONOSCOPY N/A 8/4/2017    Procedure: COLONOSCOPY TO CECUM/TI WITH POLYPECTOMY ( COLD BX);  Surgeon: Cleveland Devine MD;  Location: [facility] ENDOSCOPY;  Service:    • COLONOSCOPY N/A 8/10/2017    Procedure: COLONOSCOPY to cecum and TI with 2 clips placed at transverse;  Surgeon: Earnest PALOMO MD;  Location: [facility] ENDOSCOPY;  Service:    • ENDOSCOPY N/A 8/17/2017    Procedure: ESOPHAGOGASTRODUODENOSCOPY;  Surgeon: Porsha Ruby MD;  Location: [facility] ENDOSCOPY;  Service:    • GALLBLADDER SURGERY     • HEMORRHOIDECTOMY     • HYSTERECTOMY     • KIDNEY SURGERY  04/22/2013   • MAZE PROCEDURE     • MITRAL VALVE REPLACEMENT     • TONSILLECTOMY     • TRICUSPID VALVE REPLACEMENT           Current Outpatient Prescriptions on File Prior to Visit   Medication Sig Dispense Refill   • albuterol (PROVENTIL HFA;VENTOLIN HFA) 108 (90 Base) MCG/ACT inhaler Inhale 2 puffs Every 4 (Four) Hours As Needed for  Wheezing. 6.7 g 11   • albuterol (PROVENTIL) (2.5 MG/3ML) 0.083% nebulizer solution Take 2.5 mg by nebulization Every 4 (Four) Hours As Needed for Wheezing.  12   • aspirin 81 MG EC tablet Take 1 tablet by mouth Daily.     • atorvastatin (LIPITOR) 40 MG tablet Take 1 tablet by mouth Daily. 30 tablet 3   • calcium carbonate (TUMS) 500 MG chewable tablet Chew 500 mg 2 (Two) Times a Day As Needed for Heartburn.     • carvedilol (COREG) 6.25 MG tablet Take 2 tablets by mouth 2 (Two) Times a Day With Meals.     • cyclobenzaprine (FLEXERIL) 10 MG tablet Take 10 mg by mouth Every Night.     • ferrous sulfate 325 (65 FE) MG tablet Take 1 tablet by mouth 2 (Two) Times a Day With Meals. 60 tablet 3   • fluticasone-salmeterol (ADVAIR DISKUS) 250-50 MCG/DOSE DISKUS Inhale 1 puff 2 (Two) Times a Day. 3 each 3   • furosemide (LASIX) 40 MG tablet Take 1 tablet by mouth Daily. (Patient taking differently: Take 40 mg by mouth 2 (Two) Times a Day.)     • HYDROcodone-acetaminophen (NORCO) 5-325 MG per tablet Take 1 tablet by mouth Every 4 (Four) Hours As Needed for Moderate Pain . Chronic pain medicine for low back pain 60 tablet 0   • hydrocortisone (ANUSOL-HC) 25 MG suppository Insert 1 suppository into the rectum 2 (Two) Times a Day As Needed for Hemorrhoids. 20 suppository 0   • lisinopril (PRINIVIL,ZESTRIL) 5 MG tablet Take one Tablet by mouth Once a day 90 tablet 3   • loratadine (CLARITIN) 10 MG tablet Take 10 mg by mouth 2 (Two) Times a Day.     • meclizine (ANTIVERT) 25 MG tablet Take 25 mg by mouth 3 (Three) Times a Day As Needed for dizziness.     • Multiple Vitamins-Minerals (MULTIVITAL) tablet Take 1 tablet by mouth Daily.     • omeprazole (priLOSEC) 40 MG capsule Take 1 capsule by mouth Daily. 90 capsule 1   • polyethylene glycol (MIRALAX) packet Take 17 g by mouth 2 (Two) Times a Day.     • potassium chloride (K-DUR,KLOR-CON) 20 MEQ CR tablet Take 1 tablet by mouth Daily. 90 tablet 1   • rOPINIRole (REQUIP) 2 MG tablet  Take 1 tablet by mouth Every Night. 30 tablet 5   • sertraline (ZOLOFT) 100 MG tablet Take 1 tablet by mouth Daily. 90 tablet 1   • sodium chloride (OCEAN) 0.65 % nasal spray 2 sprays into each nostril As Needed for Congestion.     • temazepam (RESTORIL) 30 MG capsule Take 30 mg by mouth At Night As Needed for Sleep.     • tiotropium (SPIRIVA HANDIHALER) 18 MCG per inhalation capsule Place 1 capsule into inhaler and inhale Daily. 90 capsule 3     No current facility-administered medications on file prior to visit.          Social History     Social History   • Marital status:      Spouse name: N/A   • Number of children: N/A   • Years of education: N/A     Occupational History   • Not on file.     Social History Main Topics   • Smoking status: Former Smoker     Quit date: 2007   • Smokeless tobacco: Never Used   • Alcohol use No      Comment: caffiene use   • Drug use: No   • Sexual activity: Defer     Other Topics Concern   • Not on file     Social History Narrative       Family History   Problem Relation Age of Onset   • Adopted: Yes   • No Known Problems Mother    • No Known Problems Father          Review of Systems   Constitution: Positive for malaise/fatigue. Negative for decreased appetite, diaphoresis, fever, weakness, weight gain and weight loss.   HENT: Negative for congestion, hearing loss, nosebleeds and tinnitus.    Eyes: Negative for blurred vision, double vision, vision loss in left eye, vision loss in right eye and visual disturbance.   Cardiovascular:        As noted in HPI   Respiratory:        As noted HPI   Endocrine: Negative for cold intolerance and heat intolerance.   Hematologic/Lymphatic: Negative for bleeding problem. Does not bruise/bleed easily.   Skin: Negative for color change, flushing, itching and rash.   Musculoskeletal: Negative for arthritis, back pain, joint pain, joint swelling, muscle weakness and myalgias.   Gastrointestinal: Negative for bloating, abdominal pain,  "constipation, diarrhea, dysphagia, heartburn, hematemesis, hematochezia, melena, nausea and vomiting.   Genitourinary: Negative for bladder incontinence, dysuria, frequency, nocturia and urgency.   Neurological: Positive for headaches. Negative for dizziness, focal weakness, light-headedness, loss of balance, numbness, paresthesias and vertigo.   Psychiatric/Behavioral: Negative for depression, memory loss and substance abuse.       Procedures      ECG 12 Lead  Date/Time: 9/27/2017 1:40 PM  Performed by: RAFAEL COVINGTON  Authorized by: RAFAEL COVINGTON   Comparison: compared with previous ECG   Similar to previous ECG  Rhythm: sinus rhythm  Rate: normal  QRS axis: normal                 Objective:    /64  Pulse 78  Ht 67\" (170.2 cm)  Wt 164 lb (74.4 kg)  BMI 25.69 kg/m2       Physical Exam  Physical Exam   Constitutional: She is oriented to person, place, and time. She appears well-developed and well-nourished. No distress.   HENT:   Head: Normocephalic.   Eyes: Conjunctivae are normal. Pupils are equal, round, and reactive to light. No scleral icterus.   Neck: Normal carotid pulses, no hepatojugular reflux and no JVD present. Carotid bruit is not present. No tracheal deviation, no edema and no erythema present. No thyromegaly present.   Cardiovascular: Normal rate, regular rhythm, S1 normal, S2 normal, normal heart sounds and intact distal pulses.   No extrasystoles are present. PMI is not displaced.  Exam reveals no gallop, no distant heart sounds and no friction rub.    No murmur heard.  Pulses:       Carotid pulses are 2+ on the right side with bruit, and 2+ on the left side with bruit.       Radial pulses are 2+ on the right side, and 2+ on the left side.        Femoral pulses are 2+ on the right side, and 2+ on the left side.       Dorsalis pedis pulses are 1+ on the right side, and 1+ on the left side.        Posterior tibial pulses are 1+ on the right side, and 1+ on the left side. "   Pulmonary/Chest: Effort normal. No respiratory distress. She has decreased breath sounds (Throughout especially bases). She has no wheezes. She has no rhonchi. She has no rales. She exhibits no tenderness.   Abdominal: Soft. Bowel sounds are normal. She exhibits no distension and no mass. There is no hepatosplenomegaly. There is no tenderness. There is no rebound and no guarding.   Musculoskeletal: She exhibits no edema, tenderness or deformity.   Neurological: She is alert and oriented to person, place, and time.   Skin: Skin is warm and dry. No rash noted. She is not diaphoretic. No cyanosis or erythema. No pallor. Nails show no clubbing.   Psychiatric: She has a normal mood and affect. Her speech is normal and behavior is normal. Judgment and thought content normal.           Assessment:    1. This patient is a 62-year-old female with a history of severe mitral and tricuspid insufficiencies, status post mitral valve replacement with a tissue valve and tricuspid  valve repair. She has had previous normal LV systolic function. Repeat echocardiogram June 2016 unremarkable continue the same. To see us in six months.  2. Atrial fibrillation flutter. She is status post atrial appendage closure and CryoMaze procedure. Appears still be in sinus rhythm.   Atrial Fibrillation and Atrial Flutter  Assessment  • The patient has paroxysmal atrial fibrillation  • This is valvular in etiology  • The patient's CHADS2-VASc score is 2  • A [id number] score of 2 or more is considered a high risk for a thromboembolic event  • Aspirin prescribed    Plan  • Attempt to maintain sinus rhythm  • Continue beta blocker for rhythm control    Subjective - Objective  • The patient underwent cardioversion   • The patient had atrial fibrillation ablation          We'll continue the same area she will see us in follow-up in 6 months.      3. Hypertension. Her blood pressure is adequately controlled.   4. Hyperlipidemia on therapy. Continue  the same.   5. Renal insufficiency.  6. Underlying lung disease with moderate to marked pulmonary hypertension. She is following with pulmonary. She has declined transplant evaluation.  7.  GI bleeding secondary to gastritis polyp hemorrhoids.  No real recurrence off warfarin.  8.  Need for cataract surgery this is low risk procedure no further workup needed.         Plan:

## 2017-10-02 RX ORDER — CARVEDILOL 6.25 MG/1
TABLET ORAL
Qty: 180 TABLET | Refills: 1 | Status: ON HOLD | OUTPATIENT
Start: 2017-10-02 | End: 2018-01-04

## 2017-10-17 RX ORDER — FERROUS SULFATE 325(65) MG
325 TABLET ORAL 2 TIMES DAILY WITH MEALS
Qty: 180 TABLET | Refills: 1 | Status: SHIPPED | OUTPATIENT
Start: 2017-10-17 | End: 2018-04-20 | Stop reason: SDUPTHER

## 2017-10-26 ENCOUNTER — OFFICE VISIT (OUTPATIENT)
Dept: FAMILY MEDICINE CLINIC | Facility: CLINIC | Age: 62
End: 2017-10-26

## 2017-10-26 VITALS
HEART RATE: 78 BPM | DIASTOLIC BLOOD PRESSURE: 56 MMHG | HEIGHT: 67 IN | WEIGHT: 169 LBS | BODY MASS INDEX: 26.53 KG/M2 | TEMPERATURE: 97.6 F | SYSTOLIC BLOOD PRESSURE: 110 MMHG | RESPIRATION RATE: 15 BRPM | OXYGEN SATURATION: 97 %

## 2017-10-26 DIAGNOSIS — I48.0 PAF (PAROXYSMAL ATRIAL FIBRILLATION) (HCC): ICD-10-CM

## 2017-10-26 DIAGNOSIS — I10 ESSENTIAL HYPERTENSION: ICD-10-CM

## 2017-10-26 DIAGNOSIS — D50.9 IRON DEFICIENCY ANEMIA, UNSPECIFIED IRON DEFICIENCY ANEMIA TYPE: Primary | ICD-10-CM

## 2017-10-26 DIAGNOSIS — E78.2 MIXED HYPERLIPIDEMIA: ICD-10-CM

## 2017-10-26 LAB
ALBUMIN SERPL-MCNC: 4.4 G/DL (ref 3.5–5.2)
ALBUMIN/GLOB SERPL: 1.5 G/DL
ALP SERPL-CCNC: 85 U/L (ref 39–117)
ALT SERPL W P-5'-P-CCNC: 16 U/L (ref 1–33)
ANION GAP SERPL CALCULATED.3IONS-SCNC: 16.6 MMOL/L
AST SERPL-CCNC: 22 U/L (ref 1–32)
BILIRUB SERPL-MCNC: 0.4 MG/DL (ref 0.1–1.2)
BUN BLD-MCNC: 12 MG/DL (ref 8–23)
BUN/CREAT SERPL: 11.1 (ref 7–25)
CALCIUM SPEC-SCNC: 9.6 MG/DL (ref 8.6–10.5)
CHLORIDE SERPL-SCNC: 97 MMOL/L (ref 98–107)
CO2 SERPL-SCNC: 28.4 MMOL/L (ref 22–29)
CREAT BLD-MCNC: 1.08 MG/DL (ref 0.57–1)
ERYTHROCYTE [DISTWIDTH] IN BLOOD BY AUTOMATED COUNT: 15.8 % (ref 4.5–15)
GFR SERPL CREATININE-BSD FRML MDRD: 51 ML/MIN/1.73
GLOBULIN UR ELPH-MCNC: 3 GM/DL
GLUCOSE BLD-MCNC: 76 MG/DL (ref 65–99)
HCT VFR BLD AUTO: 36.6 % (ref 31–42)
HGB BLD-MCNC: 11.3 G/DL (ref 12–18)
LYMPHOCYTES # BLD AUTO: 2 10*3/MM3 (ref 1.2–3.4)
LYMPHOCYTES NFR BLD AUTO: 25.4 % (ref 21–51)
MCH RBC QN AUTO: 25.9 PG (ref 26.1–33.1)
MCHC RBC AUTO-ENTMCNC: 31 G/DL (ref 33–37)
MCV RBC AUTO: 83.5 FL (ref 80–99)
MONOCYTES # BLD AUTO: 0.2 10*3/MM3 (ref 0.1–0.6)
MONOCYTES NFR BLD AUTO: 2.3 % (ref 2–9)
NEUTROPHILS # BLD AUTO: 5.6 10*3/MM3 (ref 1.4–6.5)
NEUTROPHILS NFR BLD AUTO: 72.3 % (ref 42–75)
PLATELET # BLD AUTO: 239 10*3/MM3 (ref 150–450)
PMV BLD AUTO: 8.4 FL (ref 7.1–10.5)
POTASSIUM BLD-SCNC: 3.8 MMOL/L (ref 3.5–5.2)
PROT SERPL-MCNC: 7.4 G/DL (ref 6–8.5)
RBC # BLD AUTO: 4.38 10*6/MM3 (ref 4–6)
SODIUM BLD-SCNC: 142 MMOL/L (ref 136–145)
WBC NRBC COR # BLD: 7.7 10*3/MM3 (ref 4.5–10)

## 2017-10-26 PROCEDURE — 36415 COLL VENOUS BLD VENIPUNCTURE: CPT | Performed by: INTERNAL MEDICINE

## 2017-10-26 PROCEDURE — 99214 OFFICE O/P EST MOD 30 MIN: CPT | Performed by: INTERNAL MEDICINE

## 2017-10-26 PROCEDURE — 85025 COMPLETE CBC W/AUTO DIFF WBC: CPT | Performed by: INTERNAL MEDICINE

## 2017-10-26 PROCEDURE — 80053 COMPREHEN METABOLIC PANEL: CPT | Performed by: INTERNAL MEDICINE

## 2017-10-26 RX ORDER — HYDROCODONE BITARTRATE AND ACETAMINOPHEN 5; 325 MG/1; MG/1
1 TABLET ORAL EVERY 4 HOURS PRN
Qty: 90 TABLET | Refills: 0 | Status: SHIPPED | OUTPATIENT
Start: 2017-10-26 | End: 2017-11-29 | Stop reason: SDUPTHER

## 2017-10-26 NOTE — PROGRESS NOTES
Subjective   Paz Browne is a 62 y.o. female.     History of Present Illness   In August patient had a colonoscopy and had a massive GI bleed.  Patient was admitted to the hospital and stayed approximately 2 weeks.  Patient has paroxysmal atrial fibrillation and was on a quite elation which was stopped it is felt the anticoagulation is more harmful.  She is here today to assess her iron levels and reassess the H&H.  Her lipids have been stable on medication.  Blood pressures been running 120s over 80s.  Patient was informed her iron and H&H the be checked every 4 months.    Much of this encounter note is an electronic transcription/translation of spoken language to printed text.  The electronic translation of spoken language may permit erroneous, or at times, nonsensical words or phrases to be inadvertently transcribed.  Although I have reviewed the note for such errors, some may still exist.  The following portions of the patient's history were reviewed and updated as appropriate: allergies, current medications, past family history, past medical history, past social history, past surgical history and problem list.    Review of Systems   Constitutional: Negative for fatigue and fever.   HENT: Positive for congestion. Negative for trouble swallowing.    Eyes: Negative for discharge and visual disturbance.   Respiratory: Negative for choking and shortness of breath.    Cardiovascular: Negative for chest pain and palpitations.   Gastrointestinal: Negative for abdominal pain and blood in stool.   Endocrine: Negative.    Genitourinary: Negative for genital sores and hematuria.   Musculoskeletal: Negative for gait problem and joint swelling.   Skin: Negative for color change, pallor, rash and wound.   Allergic/Immunologic: Positive for environmental allergies. Negative for immunocompromised state.   Neurological: Negative for facial asymmetry and speech difficulty.   Psychiatric/Behavioral: Negative for  hallucinations and suicidal ideas.       Objective   Physical Exam   Constitutional: She is oriented to person, place, and time. She appears well-developed and well-nourished.   HENT:   Head: Normocephalic.   Eyes: Conjunctivae are normal. Pupils are equal, round, and reactive to light.   Neck: Normal range of motion. Neck supple.   Cardiovascular: Normal rate, regular rhythm and normal heart sounds.    Pulmonary/Chest: Effort normal and breath sounds normal.   Abdominal: Soft. Bowel sounds are normal.   Musculoskeletal: Normal range of motion.   Neurological: She is alert and oriented to person, place, and time.   Skin: Skin is warm and dry.   Psychiatric: She has a normal mood and affect. Her behavior is normal. Judgment and thought content normal.   Nursing note and vitals reviewed.      Assessment/Plan   Problems Addressed this Visit        Cardiovascular and Mediastinum    PAF (paroxysmal atrial fibrillation)    Relevant Orders    CBC & Differential    Comprehensive Metabolic Panel    Occult Blood X 1, Stool - Stool, Per Rectum    CBC Auto Differential    Hypertension    Relevant Orders    CBC & Differential    Comprehensive Metabolic Panel    Occult Blood X 1, Stool - Stool, Per Rectum    CBC Auto Differential    Hyperlipidemia    Relevant Orders    CBC & Differential    Comprehensive Metabolic Panel    Occult Blood X 1, Stool - Stool, Per Rectum    CBC Auto Differential       Hematopoietic and Hemostatic    Anemia - Primary    Relevant Orders    Hemoglobin & Hematocrit, Blood    Ferritin

## 2017-10-27 ENCOUNTER — TELEPHONE (OUTPATIENT)
Dept: FAMILY MEDICINE CLINIC | Facility: CLINIC | Age: 62
End: 2017-10-27

## 2017-10-30 ENCOUNTER — TELEPHONE (OUTPATIENT)
Dept: FAMILY MEDICINE CLINIC | Facility: CLINIC | Age: 62
End: 2017-10-30

## 2017-11-01 LAB — HEMOCCULT STL QL: POSITIVE

## 2017-11-01 PROCEDURE — 82274 ASSAY TEST FOR BLOOD FECAL: CPT | Performed by: INTERNAL MEDICINE

## 2017-11-01 RX ORDER — SERTRALINE HYDROCHLORIDE 100 MG/1
TABLET, FILM COATED ORAL
Qty: 90 TABLET | Refills: 1 | Status: ON HOLD | OUTPATIENT
Start: 2017-11-01 | End: 2018-01-04

## 2017-11-03 ENCOUNTER — TELEPHONE (OUTPATIENT)
Dept: FAMILY MEDICINE CLINIC | Facility: CLINIC | Age: 62
End: 2017-11-03

## 2017-11-03 NOTE — TELEPHONE ENCOUNTER
Test sent to [facility],patient needs to call him re: what he wants to do, pt informed to call   Dr. larson

## 2017-11-03 NOTE — TELEPHONE ENCOUNTER
PT WANTS DR LAINEZ TO TELL DR SARKAR (DOES NOT HAVE A #) ABOUT THE BLOOD IN HER STOOL. WANTS TO KNOW IF ANYTHING IS GOING TO BE DONE ABOUT IT.

## 2017-11-13 RX ORDER — TEMAZEPAM 30 MG/1
CAPSULE ORAL
Qty: 90 CAPSULE | Refills: 1 | Status: ON HOLD | OUTPATIENT
Start: 2017-11-13 | End: 2018-01-04

## 2017-11-16 ENCOUNTER — LAB (OUTPATIENT)
Dept: FAMILY MEDICINE CLINIC | Facility: CLINIC | Age: 62
End: 2017-11-16

## 2017-11-16 DIAGNOSIS — D50.9 IRON DEFICIENCY ANEMIA, UNSPECIFIED IRON DEFICIENCY ANEMIA TYPE: ICD-10-CM

## 2017-11-16 LAB
FERRITIN SERPL-MCNC: 96.06 NG/ML (ref 13–150)
HCT VFR BLD AUTO: 37.6 % (ref 31–42)
HGB BLD-MCNC: 12 G/DL (ref 12–18)

## 2017-11-16 PROCEDURE — 36415 COLL VENOUS BLD VENIPUNCTURE: CPT | Performed by: INTERNAL MEDICINE

## 2017-11-16 PROCEDURE — 85018 HEMOGLOBIN: CPT | Performed by: INTERNAL MEDICINE

## 2017-11-16 PROCEDURE — 85014 HEMATOCRIT: CPT | Performed by: INTERNAL MEDICINE

## 2017-11-16 PROCEDURE — 82728 ASSAY OF FERRITIN: CPT | Performed by: INTERNAL MEDICINE

## 2017-11-20 ENCOUNTER — TELEPHONE (OUTPATIENT)
Dept: GASTROENTEROLOGY | Facility: CLINIC | Age: 62
End: 2017-11-20

## 2017-11-20 ENCOUNTER — TELEPHONE (OUTPATIENT)
Dept: FAMILY MEDICINE CLINIC | Facility: CLINIC | Age: 62
End: 2017-11-20

## 2017-11-20 NOTE — TELEPHONE ENCOUNTER
I called her and recommend that she go to the emergency room given her melena and dizziness lightheadedness and the fact that she is on oxygen.

## 2017-11-20 NOTE — TELEPHONE ENCOUNTER
"Call to pt.  Reports had c/s 8/2 - returned to [facility] a few days later because passing blood in stool.  Clamps placed with repeat c/s.  States since then, is continuing to pass black stools - thick and clumpy.  Is on iron.  States usually constipated, but 11/17 had diarrhea and cramping.  Stool was \"black like coal\".  Denies ever seeing red blood.  Contacted Dr Martinez's office and has been advised that stool positive for blood (see note from Dr Martinez today).  Pt states if needs another c/s, would like to wait until after Thanksgiving.      Message to DR Devine.  "

## 2017-11-20 NOTE — TELEPHONE ENCOUNTER
----- Message from Clara Negrete sent at 11/20/2017  4:04 PM EST -----  Regarding: PT CALLED  Contact: [phone]   PT IS CALLING STATING SHE IS HAVING BLACK STOOLS AND WOULD LIKE FOR A NURSE TO CALL HER BACK.

## 2017-11-22 ENCOUNTER — TELEPHONE (OUTPATIENT)
Dept: GASTROENTEROLOGY | Facility: CLINIC | Age: 62
End: 2017-11-22

## 2017-11-22 ENCOUNTER — PREP FOR SURGERY (OUTPATIENT)
Dept: OTHER | Facility: HOSPITAL | Age: 62
End: 2017-11-22

## 2017-11-22 DIAGNOSIS — K92.2 GI BLEED: Primary | ICD-10-CM

## 2017-11-22 NOTE — TELEPHONE ENCOUNTER
Please schedule her to have an EGD and colonoscopy by me to be done in the next 2 weeks.  On the day of the scopes have [facility] endoscopy unit draw a CBC.conchis whitley

## 2017-11-29 ENCOUNTER — TELEPHONE (OUTPATIENT)
Dept: FAMILY MEDICINE CLINIC | Facility: CLINIC | Age: 62
End: 2017-11-29

## 2017-11-29 RX ORDER — HYDROCODONE BITARTRATE AND ACETAMINOPHEN 5; 325 MG/1; MG/1
1 TABLET ORAL EVERY 4 HOURS PRN
Qty: 90 TABLET | Refills: 0 | Status: ON HOLD | OUTPATIENT
Start: 2017-11-29 | End: 2018-01-04 | Stop reason: SDUPTHER

## 2017-12-01 PROBLEM — K92.2 GI BLEED: Status: ACTIVE | Noted: 2017-12-01

## 2017-12-22 ENCOUNTER — ANESTHESIA (OUTPATIENT)
Dept: GASTROENTEROLOGY | Facility: HOSPITAL | Age: 62
End: 2017-12-22

## 2017-12-22 ENCOUNTER — ANESTHESIA EVENT (OUTPATIENT)
Dept: GASTROENTEROLOGY | Facility: HOSPITAL | Age: 62
End: 2017-12-22

## 2017-12-22 ENCOUNTER — HOSPITAL ENCOUNTER (OUTPATIENT)
Facility: HOSPITAL | Age: 62
Setting detail: HOSPITAL OUTPATIENT SURGERY
Discharge: HOME | End: 2017-12-22
Attending: INTERNAL MEDICINE | Admitting: INTERNAL MEDICINE

## 2017-12-22 VITALS
WEIGHT: 164.06 LBS | TEMPERATURE: 98 F | DIASTOLIC BLOOD PRESSURE: 68 MMHG | HEART RATE: 64 BPM | OXYGEN SATURATION: 99 % | RESPIRATION RATE: 16 BRPM | HEIGHT: 67 IN | SYSTOLIC BLOOD PRESSURE: 112 MMHG | BODY MASS INDEX: 25.75 KG/M2

## 2017-12-22 DIAGNOSIS — K92.2 GI BLEED: ICD-10-CM

## 2017-12-22 LAB
ALBUMIN SERPL-MCNC: 3.9 G/DL (ref 3.5–5.2)
ALBUMIN/GLOB SERPL: 1.4 G/DL
ALP SERPL-CCNC: 80 U/L (ref 39–117)
ALT SERPL W P-5'-P-CCNC: 13 U/L (ref 1–33)
ANION GAP SERPL CALCULATED.3IONS-SCNC: 10.7 MMOL/L
APTT PPP: 28.2 SECONDS (ref 22.7–35.4)
AST SERPL-CCNC: 21 U/L (ref 1–32)
BASOPHILS # BLD AUTO: 0.01 10*3/MM3 (ref 0–0.2)
BASOPHILS NFR BLD AUTO: 0.2 % (ref 0–1.5)
BILIRUB SERPL-MCNC: 0.6 MG/DL (ref 0.1–1.2)
BUN BLD-MCNC: 14 MG/DL (ref 8–23)
BUN/CREAT SERPL: 13 (ref 7–25)
CALCIUM SPEC-SCNC: 8.8 MG/DL (ref 8.6–10.5)
CHLORIDE SERPL-SCNC: 102 MMOL/L (ref 98–107)
CO2 SERPL-SCNC: 30.3 MMOL/L (ref 22–29)
CREAT BLD-MCNC: 1.08 MG/DL (ref 0.57–1)
DEPRECATED RDW RBC AUTO: 45.9 FL (ref 37–54)
EOSINOPHIL # BLD AUTO: 0.1 10*3/MM3 (ref 0–0.7)
EOSINOPHIL NFR BLD AUTO: 1.8 % (ref 0.3–6.2)
ERYTHROCYTE [DISTWIDTH] IN BLOOD BY AUTOMATED COUNT: 14.1 % (ref 11.7–13)
FERRITIN SERPL-MCNC: 116.9 NG/ML (ref 13–150)
GFR SERPL CREATININE-BSD FRML MDRD: 51 ML/MIN/1.73
GLOBULIN UR ELPH-MCNC: 2.7 GM/DL
GLUCOSE BLD-MCNC: 105 MG/DL (ref 65–99)
HCT VFR BLD AUTO: 33.8 % (ref 35.6–45.5)
HGB BLD-MCNC: 10.8 G/DL (ref 11.9–15.5)
IMM GRANULOCYTES # BLD: 0 10*3/MM3 (ref 0–0.03)
IMM GRANULOCYTES NFR BLD: 0 % (ref 0–0.5)
INR PPP: 1.05 (ref 0.9–1.1)
IRON 24H UR-MRATE: 61 MCG/DL (ref 37–145)
IRON SATN MFR SERPL: 17 % (ref 20–50)
LYMPHOCYTES # BLD AUTO: 1.16 10*3/MM3 (ref 0.9–4.8)
LYMPHOCYTES NFR BLD AUTO: 20.6 % (ref 19.6–45.3)
MCH RBC QN AUTO: 28.3 PG (ref 26.9–32)
MCHC RBC AUTO-ENTMCNC: 32 G/DL (ref 32.4–36.3)
MCV RBC AUTO: 88.5 FL (ref 80.5–98.2)
MONOCYTES # BLD AUTO: 0.31 10*3/MM3 (ref 0.2–1.2)
MONOCYTES NFR BLD AUTO: 5.5 % (ref 5–12)
NEUTROPHILS # BLD AUTO: 4.04 10*3/MM3 (ref 1.9–8.1)
NEUTROPHILS NFR BLD AUTO: 71.9 % (ref 42.7–76)
PLATELET # BLD AUTO: 167 10*3/MM3 (ref 140–500)
PMV BLD AUTO: 10.9 FL (ref 6–12)
POTASSIUM BLD-SCNC: 3.7 MMOL/L (ref 3.5–5.2)
PROT SERPL-MCNC: 6.6 G/DL (ref 6–8.5)
PROTHROMBIN TIME: 13.3 SECONDS (ref 11.7–14.2)
RBC # BLD AUTO: 3.82 10*6/MM3 (ref 3.9–5.2)
SODIUM BLD-SCNC: 143 MMOL/L (ref 136–145)
TIBC SERPL-MCNC: 361 MCG/DL
TRANSFERRIN SERPL-MCNC: 242 MG/DL (ref 200–360)
VIT B12 BLD-MCNC: 855 PG/ML (ref 211–946)
WBC NRBC COR # BLD: 5.62 10*3/MM3 (ref 4.5–10.7)

## 2017-12-22 PROCEDURE — 25010000002 PROPOFOL 10 MG/ML EMULSION: Performed by: ANESTHESIOLOGY

## 2017-12-22 PROCEDURE — 85025 COMPLETE CBC W/AUTO DIFF WBC: CPT | Performed by: INTERNAL MEDICINE

## 2017-12-22 PROCEDURE — 88312 SPECIAL STAINS GROUP 1: CPT | Performed by: INTERNAL MEDICINE

## 2017-12-22 PROCEDURE — 83540 ASSAY OF IRON: CPT | Performed by: INTERNAL MEDICINE

## 2017-12-22 PROCEDURE — 25010000002 PHENYLEPHRINE PER 1 ML: Performed by: ANESTHESIOLOGY

## 2017-12-22 PROCEDURE — 82607 VITAMIN B-12: CPT | Performed by: INTERNAL MEDICINE

## 2017-12-22 PROCEDURE — 88305 TISSUE EXAM BY PATHOLOGIST: CPT | Performed by: INTERNAL MEDICINE

## 2017-12-22 PROCEDURE — 80053 COMPREHEN METABOLIC PANEL: CPT | Performed by: INTERNAL MEDICINE

## 2017-12-22 PROCEDURE — 85014 HEMATOCRIT: CPT | Performed by: INTERNAL MEDICINE

## 2017-12-22 PROCEDURE — 87081 CULTURE SCREEN ONLY: CPT | Performed by: INTERNAL MEDICINE

## 2017-12-22 PROCEDURE — 85730 THROMBOPLASTIN TIME PARTIAL: CPT | Performed by: INTERNAL MEDICINE

## 2017-12-22 PROCEDURE — 43239 EGD BIOPSY SINGLE/MULTIPLE: CPT | Performed by: INTERNAL MEDICINE

## 2017-12-22 PROCEDURE — 82747 ASSAY OF FOLIC ACID RBC: CPT | Performed by: INTERNAL MEDICINE

## 2017-12-22 PROCEDURE — 45380 COLONOSCOPY AND BIOPSY: CPT | Performed by: INTERNAL MEDICINE

## 2017-12-22 PROCEDURE — 84466 ASSAY OF TRANSFERRIN: CPT | Performed by: INTERNAL MEDICINE

## 2017-12-22 PROCEDURE — 82728 ASSAY OF FERRITIN: CPT | Performed by: INTERNAL MEDICINE

## 2017-12-22 PROCEDURE — 85610 PROTHROMBIN TIME: CPT | Performed by: INTERNAL MEDICINE

## 2017-12-22 RX ORDER — SODIUM CHLORIDE 9 MG/ML
30 INJECTION, SOLUTION INTRAVENOUS CONTINUOUS PRN
Status: DISCONTINUED | OUTPATIENT
Start: 2017-12-22 | End: 2017-12-22 | Stop reason: HOSPADM

## 2017-12-22 RX ORDER — SODIUM CHLORIDE, SODIUM LACTATE, POTASSIUM CHLORIDE, CALCIUM CHLORIDE 600; 310; 30; 20 MG/100ML; MG/100ML; MG/100ML; MG/100ML
30 INJECTION, SOLUTION INTRAVENOUS CONTINUOUS PRN
Status: DISCONTINUED | OUTPATIENT
Start: 2017-12-22 | End: 2017-12-22 | Stop reason: HOSPADM

## 2017-12-22 RX ORDER — GLYCOPYRROLATE 0.2 MG/ML
INJECTION INTRAMUSCULAR; INTRAVENOUS AS NEEDED
Status: DISCONTINUED | OUTPATIENT
Start: 2017-12-22 | End: 2017-12-22 | Stop reason: SURG

## 2017-12-22 RX ORDER — PROPOFOL 10 MG/ML
VIAL (ML) INTRAVENOUS CONTINUOUS PRN
Status: DISCONTINUED | OUTPATIENT
Start: 2017-12-22 | End: 2017-12-22 | Stop reason: SURG

## 2017-12-22 RX ORDER — PROPOFOL 10 MG/ML
VIAL (ML) INTRAVENOUS AS NEEDED
Status: DISCONTINUED | OUTPATIENT
Start: 2017-12-22 | End: 2017-12-22 | Stop reason: SURG

## 2017-12-22 RX ORDER — LIDOCAINE HYDROCHLORIDE 20 MG/ML
INJECTION, SOLUTION INFILTRATION; PERINEURAL AS NEEDED
Status: DISCONTINUED | OUTPATIENT
Start: 2017-12-22 | End: 2017-12-22 | Stop reason: SURG

## 2017-12-22 RX ADMIN — GLYCOPYRROLATE 0.2 MCG: 0.2 INJECTION INTRAMUSCULAR; INTRAVENOUS at 08:50

## 2017-12-22 RX ADMIN — PHENYLEPHRINE HYDROCHLORIDE 100 MCG: 10 INJECTION INTRAVENOUS at 08:43

## 2017-12-22 RX ADMIN — GLYCOPYRROLATE 0.2 MCG: 0.2 INJECTION INTRAMUSCULAR; INTRAVENOUS at 08:45

## 2017-12-22 RX ADMIN — PROPOFOL 120 MCG/KG/MIN: 10 INJECTION, EMULSION INTRAVENOUS at 08:21

## 2017-12-22 RX ADMIN — SODIUM CHLORIDE 30 ML/HR: 9 INJECTION, SOLUTION INTRAVENOUS at 07:28

## 2017-12-22 RX ADMIN — PROPOFOL 100 MG: 10 INJECTION, EMULSION INTRAVENOUS at 08:21

## 2017-12-22 RX ADMIN — GLYCOPYRROLATE 0.2 MCG: 0.2 INJECTION INTRAMUSCULAR; INTRAVENOUS at 08:42

## 2017-12-22 RX ADMIN — EPHEDRINE SULFATE 10 MG: 50 INJECTION INTRAMUSCULAR; INTRAVENOUS; SUBCUTANEOUS at 08:40

## 2017-12-22 RX ADMIN — LIDOCAINE HYDROCHLORIDE 50 MG: 20 INJECTION, SOLUTION INFILTRATION; PERINEURAL at 08:21

## 2017-12-22 RX ADMIN — SODIUM CHLORIDE, POTASSIUM CHLORIDE, SODIUM LACTATE AND CALCIUM CHLORIDE: 600; 310; 30; 20 INJECTION, SOLUTION INTRAVENOUS at 08:07

## 2017-12-22 RX ADMIN — PHENYLEPHRINE HYDROCHLORIDE 200 MCG: 10 INJECTION INTRAVENOUS at 08:45

## 2017-12-22 RX ADMIN — EPHEDRINE SULFATE 10 MG: 50 INJECTION INTRAMUSCULAR; INTRAVENOUS; SUBCUTANEOUS at 08:39

## 2017-12-22 NOTE — H&P
[facility]  Pre Procedure History & Physical    Chief Complaint:   61 yo female who c/o nausea and melena since her last 8/17 colonoscopy. She is on iron pills but Dr. Patel Martinez found her to have heme positive stool. She has a h/o colon polyps. During a colonoscopy in 8/17 I removed 11 small polyps and she had bleeding a few week after that. Repeat colonscopy by Dr. SUJIT Palacios found a polypectomy sight that he clipped.    Subjective     HPI:   62 y.o. female who c/o nausea and melena since her last 8/17 colonoscopy. She is on iron pills but Dr. Patel Martinez found her to have heme positive stool. She has a h/o colon polyps. During a colonoscopy in 8/17 I removed 11 small polyps and she had bleeding a few week after that. Repeat colonscopy by Dr. SUJIT Palacios found a polypectomy sight that he clipped.        Past Medical History:   Past Medical History:   Diagnosis Date   • Anemia    • Atrial fibrillation     Fani procedure   • Atrial flutter     cardioversion   • Cataract    • Celiac artery stenosis    • CHF (congestive heart failure)    • Chronic respiratory failure with hypoxia    • Colon polyp    • COPD (chronic obstructive pulmonary disease)    • Fall    • GI bleed    • History of mitral valve replacement with tissue graft    • Hyperlipidemia    • Hypertension    • Intertrigo    • Long term (current) use of anticoagulants    • Mitral regurgitation    • Palpitations    • Pulmonary hypertension    • Renal failure    • Tachycardia    • Valvular heart disease        Family History:  Family History   Problem Relation Age of Onset   • Adopted: Yes   • No Known Problems Mother    • No Known Problems Father        Social History:   reports that she quit smoking about 10 years ago. She has never used smokeless tobacco. She reports that she does not drink alcohol or use illicit drugs.    Medications:   Prescriptions Prior to Admission   Medication Sig Dispense Refill Last Dose   • albuterol  (PROVENTIL HFA;VENTOLIN HFA) 108 (90 Base) MCG/ACT inhaler Inhale 2 puffs Every 4 (Four) Hours As Needed for Wheezing. 6.7 g 11 12/22/2017 at Unknown time   • atorvastatin (LIPITOR) 40 MG tablet Take 1 tablet by mouth Daily. 90 tablet 3 12/22/2017 at Unknown time   • carvedilol (COREG) 6.25 MG tablet TAKE 1 TABLET BY MOUTH 2 (TWO) TIMES A DAY WITH MEALS. 180 tablet 1 12/22/2017 at Unknown time   • fluticasone-salmeterol (ADVAIR DISKUS) 250-50 MCG/DOSE DISKUS Inhale 1 puff 2 (Two) Times a Day. 3 each 3 12/22/2017 at Unknown time   • furosemide (LASIX) 40 MG tablet Take 1 tablet by mouth 2 (Two) Times a Day. 180 tablet 1 12/22/2017 at Unknown time   • HYDROcodone-acetaminophen (NORCO) 5-325 MG per tablet Take 1 tablet by mouth Every 4 (Four) Hours As Needed for Moderate Pain . Chronic pain medicine for low back pain 90 tablet 0 12/22/2017 at Unknown time   • hydrocortisone (ANUSOL-HC) 25 MG suppository Insert 1 suppository into the rectum 2 (Two) Times a Day As Needed for Hemorrhoids. 20 suppository 0 Past Month at Unknown time   • lisinopril (PRINIVIL,ZESTRIL) 5 MG tablet Take one Tablet by mouth Once a day 90 tablet 1 12/22/2017 at Unknown time   • loratadine (CLARITIN) 10 MG tablet Take 10 mg by mouth 2 (Two) Times a Day.   12/22/2017 at Unknown time   • omeprazole (priLOSEC) 40 MG capsule Take 1 capsule by mouth Daily. 90 capsule 1 Past Week at Unknown time   • polyethylene glycol (MIRALAX) packet Take 17 g by mouth 2 (Two) Times a Day.   12/22/2017 at Unknown time   • potassium chloride (K-DUR,KLOR-CON) 20 MEQ CR tablet Take 1 tablet by mouth Daily. 90 tablet 1 12/22/2017 at Unknown time   • rOPINIRole (REQUIP) 2 MG tablet Take 1 tablet by mouth Every Night. 90 tablet 3 12/21/2017 at Unknown time   • sertraline (ZOLOFT) 100 MG tablet TAKE 1 TABLET BY MOUTH DAILY. 90 tablet 1 12/22/2017 at Unknown time   • sodium chloride (OCEAN) 0.65 % nasal spray 2 sprays into each nostril As Needed for Congestion.   Past Week  "at Unknown time   • temazepam (RESTORIL) 30 MG capsule TAKE ONE CAPSULE BY MOUTH AT BEDTIME 90 capsule 1 12/21/2017 at Unknown time   • tiotropium (SPIRIVA HANDIHALER) 18 MCG per inhalation capsule Place 1 capsule into inhaler and inhale Daily. 90 capsule 3 12/22/2017 at Unknown time   • albuterol (PROVENTIL) (2.5 MG/3ML) 0.083% nebulizer solution Take 2.5 mg by nebulization Every 4 (Four) Hours As Needed for Wheezing.  12 Taking   • aspirin 81 MG EC tablet Take 1 tablet by mouth Daily.   12/20/2017   • calcium carbonate (TUMS) 500 MG chewable tablet Chew 500 mg 2 (Two) Times a Day As Needed for Heartburn.   12/20/2017   • cyclobenzaprine (FLEXERIL) 10 MG tablet Take 10 mg by mouth Every Night.   12/21/2017   • ferrous sulfate 325 (65 FE) MG tablet Take 1 tablet by mouth 2 (Two) Times a Day With Meals. 180 tablet 1 12/20/2017   • meclizine (ANTIVERT) 25 MG tablet Take 25 mg by mouth 3 (Three) Times a Day As Needed for dizziness.   More than a month at Unknown time   • Multiple Vitamins-Minerals (MULTIVITAL) tablet Take 1 tablet by mouth Daily.   12/20/2017       Allergies:  Bupropion; Cephalexin; and Metoprolol    ROS:    Pertinent items are noted in HPI     Objective     Blood pressure 128/63, pulse 68, temperature 98.1 °F (36.7 °C), temperature source Oral, resp. rate 16, height 170.2 cm (67\"), weight 74.4 kg (164 lb 1 oz), SpO2 98 %.    Physical Exam   Constitutional: Pt is oriented to person, place, and time and well-developed, well-nourished, and in no distress.   HENT:   Mouth/Throat: Oropharynx is clear and moist.   Neck: Normal range of motion. Neck supple.   Cardiovascular: Normal rate, regular rhythm and normal heart sounds.    Pulmonary/Chest: Effort normal and breath sounds normal. No respiratory distress. No  wheezes.   Abdominal: Soft. Bowel sounds are normal.   Skin: Skin is warm and dry.   Psychiatric: Mood, memory, affect and judgment normal.     Assessment/Plan     Diagnosis:  62 y.o. female who " c/o nausea and melena since her last 8/17 colonoscopy. She is on iron pills but Dr. Patel Martinez found her to have heme positive stool. She has a h/o colon polyps. During a colonoscopy in 8/17 I removed 11 small polyps and she had bleeding a few weeks after that. Repeat colonscopy by Dr. SUJIT Palacios (later in 8/17) found a polypectomy sight that he clipped.      Anticipated Surgical Procedure:  EGD and colonoscopy.    The risks, benefits, and alternatives of this procedure have been discussed with the patient or the responsible party- the patient understands and agrees to proceed.

## 2017-12-22 NOTE — ANESTHESIA POSTPROCEDURE EVALUATION
Patient: Paz Browne    Procedure Summary     Date Anesthesia Start Anesthesia Stop Room / Location    12/22/17 0807 0905  [facility]       Procedure Diagnosis Surgeon Provider    ESOPHAGOGASTRODUODENOSCOPY WITH BIOPSIES (N/A Esophagus); COLONOSCOPY INTO CECUM WITH COLD POLYPECTOMIES (N/A ) GI bleed  (GI bleed [K92.2]) MD Akbar Cota MD          Anesthesia Type: MAC  Last vitals  BP   96/46 (12/22/17 0907)   Temp   36.7 °C (98.1 °F) (12/22/17 0711)   Pulse   60 (12/22/17 0907)   Resp   16 (12/22/17 0907)     SpO2   100 % (12/22/17 0907)     Post Anesthesia Care and Evaluation    Patient location during evaluation: PACU  Patient participation: complete - patient participated  Level of consciousness: awake and alert  Pain score: 0  Pain management: adequate  Airway patency: patent  Anesthetic complications: No anesthetic complications    Cardiovascular status: acceptable  Respiratory status: acceptable  Hydration status: acceptable

## 2017-12-22 NOTE — DISCHARGE INSTRUCTIONS
For the next 24 hours patient needs to be with a responsible adult.    For 24 hours DO NOT drive, operate machinery, appliances, drink alcohol, make important decisions or sign legal documents.    Start with a light or bland diet and advance to regular diet as tolerated.    You may have a sore, scratchy throat today.    Follow recommendations on procedure report provided by your doctor.    Call Dr Devine for problems [phone]    Problems may include but not limited to: large amounts of bleeding, trouble breathing, repeated vomiting, severe unrelieved pain, fever or chills.

## 2017-12-22 NOTE — PLAN OF CARE
Problem: Patient Care Overview (Adult)  Goal: Plan of Care Review  Outcome: Ongoing (interventions implemented as appropriate)   12/22/17 0706   Coping/Psychosocial Response Interventions   Plan Of Care Reviewed With patient   Patient Care Overview   Progress no change     Goal: Adult Individualization and Mutuality  Outcome: Ongoing (interventions implemented as appropriate)    Goal: Discharge Needs Assessment  Outcome: Ongoing (interventions implemented as appropriate)   12/22/17 0706   Discharge Needs Assessment   Concerns To Be Addressed basic needs concerns   Equipment Needed After Discharge oxygen   Discharge Disposition home or self-care   Self-Care   Equipment Currently Used at Home oxygen   Living Environment   Transportation Available car       Problem: GI Endoscopy (Adult)  Goal: Signs and Symptoms of Listed Potential Problems Will be Absent or Manageable (GI Endoscopy)  Outcome: Ongoing (interventions implemented as appropriate)   12/22/17 0706   GI Endoscopy   Problems Assessed (GI Endoscopy) pain   Problems Present (GI Endoscopy) none

## 2017-12-22 NOTE — ANESTHESIA PREPROCEDURE EVALUATION
Anesthesia Evaluation     Patient summary reviewed          Airway   Mallampati: III  TM distance: >3 FB  Neck ROM: full  no difficulty expected  Dental    (+) lower dentures and upper dentures    Pulmonary     breath sounds clear to auscultation  Cardiovascular   Exercise tolerance: poor (<4 METS)    Rhythm: irregular  Rate: normal        Neuro/Psych  GI/Hepatic/Renal/Endo      Musculoskeletal     Abdominal    Substance History      OB/GYN          Other                                        Anesthesia Plan    ASA 3     MAC     intravenous induction   Anesthetic plan and risks discussed with patient.

## 2017-12-23 LAB — UREASE TISS QL: NEGATIVE

## 2017-12-26 ENCOUNTER — TELEPHONE (OUTPATIENT)
Dept: GASTROENTEROLOGY | Facility: CLINIC | Age: 62
End: 2017-12-26

## 2017-12-26 DIAGNOSIS — D64.9 ANEMIA, UNSPECIFIED TYPE: Primary | ICD-10-CM

## 2017-12-26 LAB
CYTO UR: NORMAL
FOLATE BLD-MCNC: 521.4 NG/ML
FOLATE RBC-MCNC: 1609 NG/ML
HCT VFR BLD AUTO: 32.4 % (ref 34–46.6)
LAB AP CASE REPORT: NORMAL
Lab: NORMAL
PATH REPORT.FINAL DX SPEC: NORMAL
PATH REPORT.GROSS SPEC: NORMAL

## 2017-12-26 NOTE — TELEPHONE ENCOUNTER
Called pt and spoke with pt's  with her permission.  Pt is coming in this Friday at 10am for her cbc and at that time will arrange her next cbc.  Pt already has appt set up with danuta Newby NP for 01/19/2018.  Orders placed for weekly cbc thru [facility].

## 2017-12-26 NOTE — TELEPHONE ENCOUNTER
----- Message from Cleveland Devine MD sent at 12/26/2017  1:09 PM EST -----  I called her with the labs and path results from last weeks EGD and Colonoscopy. I would recommend that she continue taking iron sulfate 325 mg one po BID. I want her to have weekly CBC's. She is to f/u with one of the NP's in about 3 weeks which is fine.   SA/MT - please order weekly CBC's for her to have at our office prior to following up with one of the NP's in about 3 weeks. I will cosign orders. [email or web address]

## 2017-12-26 NOTE — PROGRESS NOTES
I called her with the labs and path results from last weeks EGD and Colonoscopy. I would recommend that she continue taking iron sulfate 325 mg one po BID. I want her to have weekly CBC's. She is to f/u with one of the NP's in about 3 weeks which is fine.   SA/MT - please order weekly CBC's for her to have at our office prior to following up with one of the NP's in about 3 weeks. I will cosign orders. [email or web address]

## 2017-12-28 ENCOUNTER — TELEPHONE (OUTPATIENT)
Dept: GASTROENTEROLOGY | Facility: CLINIC | Age: 62
End: 2017-12-28

## 2017-12-28 DIAGNOSIS — D64.9 ANEMIA, UNSPECIFIED TYPE: Primary | ICD-10-CM

## 2017-12-29 ENCOUNTER — RESULTS ENCOUNTER (OUTPATIENT)
Dept: GASTROENTEROLOGY | Facility: CLINIC | Age: 62
End: 2017-12-29

## 2017-12-29 DIAGNOSIS — D64.9 ANEMIA, UNSPECIFIED TYPE: ICD-10-CM

## 2017-12-29 LAB
BASOPHILS # BLD AUTO: 0.03 10*3/MM3 (ref 0–0.2)
BASOPHILS NFR BLD AUTO: 0.3 % (ref 0–1.5)
EOSINOPHIL # BLD AUTO: 0.17 10*3/MM3 (ref 0–0.7)
EOSINOPHIL NFR BLD AUTO: 1.7 % (ref 0.3–6.2)
ERYTHROCYTE [DISTWIDTH] IN BLOOD BY AUTOMATED COUNT: 14.5 % (ref 11.7–13)
HCT VFR BLD AUTO: 37.7 % (ref 35.6–45.5)
HGB BLD-MCNC: 11.8 G/DL (ref 11.9–15.5)
IMM GRANULOCYTES # BLD: 0.02 10*3/MM3 (ref 0–0.03)
IMM GRANULOCYTES NFR BLD: 0.2 % (ref 0–0.5)
LYMPHOCYTES # BLD AUTO: 1.34 10*3/MM3 (ref 0.9–4.8)
LYMPHOCYTES NFR BLD AUTO: 13.4 % (ref 19.6–45.3)
MCH RBC QN AUTO: 28.7 PG (ref 26.9–32)
MCHC RBC AUTO-ENTMCNC: 31.3 G/DL (ref 32.4–36.3)
MCV RBC AUTO: 91.7 FL (ref 80.5–98.2)
MONOCYTES # BLD AUTO: 0.46 10*3/MM3 (ref 0.2–1.2)
MONOCYTES NFR BLD AUTO: 4.6 % (ref 5–12)
NEUTROPHILS # BLD AUTO: 7.96 10*3/MM3 (ref 1.9–8.1)
NEUTROPHILS NFR BLD AUTO: 79.8 % (ref 42.7–76)
PLATELET # BLD AUTO: 206 10*3/MM3 (ref 140–500)
RBC # BLD AUTO: 4.11 10*6/MM3 (ref 3.9–5.2)
WBC # BLD AUTO: 9.98 10*3/MM3 (ref 4.5–10.7)

## 2018-01-01 NOTE — PROGRESS NOTES
Tell her that her CBC from 12/29/17 looked better that one week prior with a higher Hgb of 11.8 (though she is still mildly anemic). Continue taking iron pills BID and constinue getting weekly CBC's. sheyla

## 2018-01-02 ENCOUNTER — TELEPHONE (OUTPATIENT)
Dept: GASTROENTEROLOGY | Facility: CLINIC | Age: 63
End: 2018-01-02

## 2018-01-02 ENCOUNTER — APPOINTMENT (OUTPATIENT)
Dept: GENERAL RADIOLOGY | Facility: HOSPITAL | Age: 63
End: 2018-01-02

## 2018-01-02 ENCOUNTER — HOSPITAL ENCOUNTER (INPATIENT)
Facility: HOSPITAL | Age: 63
LOS: 5 days | Discharge: HOME HEALTH | End: 2018-01-07
Attending: EMERGENCY MEDICINE | Admitting: INTERNAL MEDICINE

## 2018-01-02 DIAGNOSIS — J44.1 ACUTE EXACERBATION OF CHRONIC OBSTRUCTIVE PULMONARY DISEASE (COPD) (HCC): Primary | ICD-10-CM

## 2018-01-02 DIAGNOSIS — J18.9 PNEUMONIA OF RIGHT MIDDLE LOBE DUE TO INFECTIOUS ORGANISM: ICD-10-CM

## 2018-01-02 DIAGNOSIS — I50.9 ACUTE CONGESTIVE HEART FAILURE, UNSPECIFIED CONGESTIVE HEART FAILURE TYPE: ICD-10-CM

## 2018-01-02 LAB
ALBUMIN SERPL-MCNC: 4.3 G/DL (ref 3.5–5.2)
ALBUMIN/GLOB SERPL: 1.3 G/DL
ALP SERPL-CCNC: 102 U/L (ref 39–117)
ALT SERPL W P-5'-P-CCNC: 19 U/L (ref 1–33)
ANION GAP SERPL CALCULATED.3IONS-SCNC: 10.7 MMOL/L
AST SERPL-CCNC: 23 U/L (ref 1–32)
B PERT DNA SPEC QL NAA+PROBE: NOT DETECTED
BASOPHILS # BLD AUTO: 0.01 10*3/MM3 (ref 0–0.2)
BASOPHILS NFR BLD AUTO: 0.1 % (ref 0–1.5)
BILIRUB SERPL-MCNC: 0.9 MG/DL (ref 0.1–1.2)
BUN BLD-MCNC: 10 MG/DL (ref 8–23)
BUN/CREAT SERPL: 10.1 (ref 7–25)
C PNEUM DNA NPH QL NAA+NON-PROBE: NOT DETECTED
CALCIUM SPEC-SCNC: 9 MG/DL (ref 8.6–10.5)
CHLORIDE SERPL-SCNC: 92 MMOL/L (ref 98–107)
CO2 SERPL-SCNC: 35.3 MMOL/L (ref 22–29)
CREAT BLD-MCNC: 0.99 MG/DL (ref 0.57–1)
D-LACTATE SERPL-SCNC: 1.1 MMOL/L (ref 0.5–2)
DEPRECATED RDW RBC AUTO: 47.1 FL (ref 37–54)
EOSINOPHIL # BLD AUTO: 0.02 10*3/MM3 (ref 0–0.7)
EOSINOPHIL NFR BLD AUTO: 0.2 % (ref 0.3–6.2)
ERYTHROCYTE [DISTWIDTH] IN BLOOD BY AUTOMATED COUNT: 14.3 % (ref 11.7–13)
FLUAV AG NPH QL: NEGATIVE
FLUAV H1 2009 PAND RNA NPH QL NAA+PROBE: NOT DETECTED
FLUAV H1 HA GENE NPH QL NAA+PROBE: NOT DETECTED
FLUAV H3 RNA NPH QL NAA+PROBE: NOT DETECTED
FLUAV SUBTYP SPEC NAA+PROBE: NOT DETECTED
FLUBV AG NPH QL IA: NEGATIVE
FLUBV RNA ISLT QL NAA+PROBE: NOT DETECTED
GFR SERPL CREATININE-BSD FRML MDRD: 56 ML/MIN/1.73
GLOBULIN UR ELPH-MCNC: 3.4 GM/DL
GLUCOSE BLD-MCNC: 125 MG/DL (ref 65–99)
HADV DNA SPEC NAA+PROBE: NOT DETECTED
HCOV 229E RNA SPEC QL NAA+PROBE: NOT DETECTED
HCOV HKU1 RNA SPEC QL NAA+PROBE: NOT DETECTED
HCOV NL63 RNA SPEC QL NAA+PROBE: NOT DETECTED
HCOV OC43 RNA SPEC QL NAA+PROBE: NOT DETECTED
HCT VFR BLD AUTO: 34.7 % (ref 35.6–45.5)
HGB BLD-MCNC: 10.9 G/DL (ref 11.9–15.5)
HMPV RNA NPH QL NAA+NON-PROBE: NOT DETECTED
HOLD SPECIMEN: NORMAL
HOLD SPECIMEN: NORMAL
HPIV1 RNA SPEC QL NAA+PROBE: NOT DETECTED
HPIV2 RNA SPEC QL NAA+PROBE: NOT DETECTED
HPIV3 RNA NPH QL NAA+PROBE: NOT DETECTED
HPIV4 P GENE NPH QL NAA+PROBE: NOT DETECTED
IMM GRANULOCYTES # BLD: 0.07 10*3/MM3 (ref 0–0.03)
IMM GRANULOCYTES NFR BLD: 0.7 % (ref 0–0.5)
LYMPHOCYTES # BLD AUTO: 0.78 10*3/MM3 (ref 0.9–4.8)
LYMPHOCYTES NFR BLD AUTO: 7.7 % (ref 19.6–45.3)
M PNEUMO IGG SER IA-ACNC: NOT DETECTED
MCH RBC QN AUTO: 28.8 PG (ref 26.9–32)
MCHC RBC AUTO-ENTMCNC: 31.4 G/DL (ref 32.4–36.3)
MCV RBC AUTO: 91.6 FL (ref 80.5–98.2)
MONOCYTES # BLD AUTO: 0.46 10*3/MM3 (ref 0.2–1.2)
MONOCYTES NFR BLD AUTO: 4.5 % (ref 5–12)
NEUTROPHILS # BLD AUTO: 8.82 10*3/MM3 (ref 1.9–8.1)
NEUTROPHILS NFR BLD AUTO: 86.8 % (ref 42.7–76)
NT-PROBNP SERPL-MCNC: 5567 PG/ML (ref 5–900)
PLATELET # BLD AUTO: 187 10*3/MM3 (ref 140–500)
PMV BLD AUTO: 10.6 FL (ref 6–12)
POTASSIUM BLD-SCNC: 2.9 MMOL/L (ref 3.5–5.2)
POTASSIUM BLD-SCNC: 3 MMOL/L (ref 3.5–5.2)
PROCALCITONIN SERPL-MCNC: 0.07 NG/ML (ref 0.1–0.25)
PROT SERPL-MCNC: 7.7 G/DL (ref 6–8.5)
RBC # BLD AUTO: 3.79 10*6/MM3 (ref 3.9–5.2)
RHINOVIRUS RNA SPEC NAA+PROBE: NOT DETECTED
RSV RNA NPH QL NAA+NON-PROBE: DETECTED
SODIUM BLD-SCNC: 138 MMOL/L (ref 136–145)
TROPONIN T SERPL-MCNC: <0.01 NG/ML (ref 0–0.03)
WBC NRBC COR # BLD: 10.16 10*3/MM3 (ref 4.5–10.7)
WHOLE BLOOD HOLD SPECIMEN: NORMAL
WHOLE BLOOD HOLD SPECIMEN: NORMAL

## 2018-01-02 PROCEDURE — 93005 ELECTROCARDIOGRAM TRACING: CPT | Performed by: EMERGENCY MEDICINE

## 2018-01-02 PROCEDURE — 87205 SMEAR GRAM STAIN: CPT | Performed by: INTERNAL MEDICINE

## 2018-01-02 PROCEDURE — 94799 UNLISTED PULMONARY SVC/PX: CPT

## 2018-01-02 PROCEDURE — 25010000002 ENOXAPARIN PER 10 MG: Performed by: INTERNAL MEDICINE

## 2018-01-02 PROCEDURE — 85025 COMPLETE CBC W/AUTO DIFF WBC: CPT | Performed by: EMERGENCY MEDICINE

## 2018-01-02 PROCEDURE — 94640 AIRWAY INHALATION TREATMENT: CPT

## 2018-01-02 PROCEDURE — 87798 DETECT AGENT NOS DNA AMP: CPT | Performed by: INTERNAL MEDICINE

## 2018-01-02 PROCEDURE — 71046 X-RAY EXAM CHEST 2 VIEWS: CPT

## 2018-01-02 PROCEDURE — 84132 ASSAY OF SERUM POTASSIUM: CPT | Performed by: INTERNAL MEDICINE

## 2018-01-02 PROCEDURE — 87804 INFLUENZA ASSAY W/OPTIC: CPT | Performed by: EMERGENCY MEDICINE

## 2018-01-02 PROCEDURE — 83605 ASSAY OF LACTIC ACID: CPT | Performed by: EMERGENCY MEDICINE

## 2018-01-02 PROCEDURE — 25010000002 PIPERACILLIN SOD-TAZOBACTAM PER 1 G: Performed by: INTERNAL MEDICINE

## 2018-01-02 PROCEDURE — 87070 CULTURE OTHR SPECIMN AEROBIC: CPT | Performed by: INTERNAL MEDICINE

## 2018-01-02 PROCEDURE — 84145 PROCALCITONIN (PCT): CPT | Performed by: EMERGENCY MEDICINE

## 2018-01-02 PROCEDURE — 99285 EMERGENCY DEPT VISIT HI MDM: CPT

## 2018-01-02 PROCEDURE — 83880 ASSAY OF NATRIURETIC PEPTIDE: CPT | Performed by: EMERGENCY MEDICINE

## 2018-01-02 PROCEDURE — 87486 CHLMYD PNEUM DNA AMP PROBE: CPT | Performed by: INTERNAL MEDICINE

## 2018-01-02 PROCEDURE — 25010000002 PIPERACILLIN SOD-TAZOBACTAM PER 1 G: Performed by: EMERGENCY MEDICINE

## 2018-01-02 PROCEDURE — 87633 RESP VIRUS 12-25 TARGETS: CPT | Performed by: INTERNAL MEDICINE

## 2018-01-02 PROCEDURE — 25010000002 FUROSEMIDE PER 20 MG: Performed by: EMERGENCY MEDICINE

## 2018-01-02 PROCEDURE — 84484 ASSAY OF TROPONIN QUANT: CPT | Performed by: EMERGENCY MEDICINE

## 2018-01-02 PROCEDURE — 80053 COMPREHEN METABOLIC PANEL: CPT | Performed by: EMERGENCY MEDICINE

## 2018-01-02 PROCEDURE — 87040 BLOOD CULTURE FOR BACTERIA: CPT | Performed by: EMERGENCY MEDICINE

## 2018-01-02 PROCEDURE — 93010 ELECTROCARDIOGRAM REPORT: CPT | Performed by: INTERNAL MEDICINE

## 2018-01-02 PROCEDURE — 87581 M.PNEUMON DNA AMP PROBE: CPT | Performed by: INTERNAL MEDICINE

## 2018-01-02 RX ORDER — SODIUM CHLORIDE 0.9 % (FLUSH) 0.9 %
10 SYRINGE (ML) INJECTION AS NEEDED
Status: DISCONTINUED | OUTPATIENT
Start: 2018-01-02 | End: 2018-01-07 | Stop reason: HOSPADM

## 2018-01-02 RX ORDER — CARVEDILOL 6.25 MG/1
6.25 TABLET ORAL 2 TIMES DAILY WITH MEALS
Status: DISCONTINUED | OUTPATIENT
Start: 2018-01-02 | End: 2018-01-07 | Stop reason: HOSPADM

## 2018-01-02 RX ORDER — ACETAMINOPHEN 500 MG
1000 TABLET ORAL ONCE
Status: COMPLETED | OUTPATIENT
Start: 2018-01-02 | End: 2018-01-02

## 2018-01-02 RX ORDER — BISACODYL 10 MG
10 SUPPOSITORY, RECTAL RECTAL DAILY PRN
Status: DISCONTINUED | OUTPATIENT
Start: 2018-01-02 | End: 2018-01-07 | Stop reason: HOSPADM

## 2018-01-02 RX ORDER — HYDROCODONE BITARTRATE AND ACETAMINOPHEN 5; 325 MG/1; MG/1
1 TABLET ORAL EVERY 4 HOURS PRN
Status: DISCONTINUED | OUTPATIENT
Start: 2018-01-02 | End: 2018-01-07 | Stop reason: HOSPADM

## 2018-01-02 RX ORDER — ONDANSETRON 2 MG/ML
4 INJECTION INTRAMUSCULAR; INTRAVENOUS EVERY 6 HOURS PRN
Status: DISCONTINUED | OUTPATIENT
Start: 2018-01-02 | End: 2018-01-07 | Stop reason: HOSPADM

## 2018-01-02 RX ORDER — BENZONATATE 100 MG/1
100 CAPSULE ORAL EVERY 4 HOURS PRN
Status: DISCONTINUED | OUTPATIENT
Start: 2018-01-02 | End: 2018-01-05

## 2018-01-02 RX ORDER — IPRATROPIUM BROMIDE AND ALBUTEROL SULFATE 2.5; .5 MG/3ML; MG/3ML
3 SOLUTION RESPIRATORY (INHALATION)
Status: DISCONTINUED | OUTPATIENT
Start: 2018-01-02 | End: 2018-01-03

## 2018-01-02 RX ORDER — POTASSIUM CHLORIDE 750 MG/1
40 CAPSULE, EXTENDED RELEASE ORAL ONCE
Status: COMPLETED | OUTPATIENT
Start: 2018-01-02 | End: 2018-01-02

## 2018-01-02 RX ORDER — BISACODYL 5 MG/1
5 TABLET, DELAYED RELEASE ORAL DAILY PRN
Status: DISCONTINUED | OUTPATIENT
Start: 2018-01-02 | End: 2018-01-07 | Stop reason: HOSPADM

## 2018-01-02 RX ORDER — POLYETHYLENE GLYCOL 3350 17 G/17G
17 POWDER, FOR SOLUTION ORAL DAILY
Status: DISCONTINUED | OUTPATIENT
Start: 2018-01-02 | End: 2018-01-07 | Stop reason: HOSPADM

## 2018-01-02 RX ORDER — FUROSEMIDE 40 MG/1
40 TABLET ORAL
Status: DISCONTINUED | OUTPATIENT
Start: 2018-01-02 | End: 2018-01-07 | Stop reason: HOSPADM

## 2018-01-02 RX ORDER — ROPINIROLE 2 MG/1
2 TABLET, FILM COATED ORAL NIGHTLY
Status: DISCONTINUED | OUTPATIENT
Start: 2018-01-02 | End: 2018-01-07 | Stop reason: HOSPADM

## 2018-01-02 RX ORDER — FUROSEMIDE 10 MG/ML
40 INJECTION INTRAMUSCULAR; INTRAVENOUS ONCE
Status: COMPLETED | OUTPATIENT
Start: 2018-01-02 | End: 2018-01-02

## 2018-01-02 RX ORDER — POTASSIUM CHLORIDE 750 MG/1
40 CAPSULE, EXTENDED RELEASE ORAL AS NEEDED
Status: DISCONTINUED | OUTPATIENT
Start: 2018-01-02 | End: 2018-01-07 | Stop reason: HOSPADM

## 2018-01-02 RX ORDER — ECHINACEA PURPUREA EXTRACT 125 MG
2 TABLET ORAL AS NEEDED
Status: DISCONTINUED | OUTPATIENT
Start: 2018-01-02 | End: 2018-01-07 | Stop reason: HOSPADM

## 2018-01-02 RX ORDER — ONDANSETRON 4 MG/1
4 TABLET, FILM COATED ORAL EVERY 6 HOURS PRN
Status: DISCONTINUED | OUTPATIENT
Start: 2018-01-02 | End: 2018-01-07 | Stop reason: HOSPADM

## 2018-01-02 RX ORDER — IPRATROPIUM BROMIDE AND ALBUTEROL SULFATE 2.5; .5 MG/3ML; MG/3ML
3 SOLUTION RESPIRATORY (INHALATION) EVERY 4 HOURS PRN
Status: DISCONTINUED | OUTPATIENT
Start: 2018-01-02 | End: 2018-01-07 | Stop reason: HOSPADM

## 2018-01-02 RX ORDER — TEMAZEPAM 15 MG/1
15 CAPSULE ORAL NIGHTLY PRN
Status: DISCONTINUED | OUTPATIENT
Start: 2018-01-02 | End: 2018-01-07 | Stop reason: HOSPADM

## 2018-01-02 RX ORDER — SERTRALINE HYDROCHLORIDE 100 MG/1
100 TABLET, FILM COATED ORAL DAILY
Status: DISCONTINUED | OUTPATIENT
Start: 2018-01-02 | End: 2018-01-07 | Stop reason: HOSPADM

## 2018-01-02 RX ORDER — SODIUM CHLORIDE 0.9 % (FLUSH) 0.9 %
1-10 SYRINGE (ML) INJECTION AS NEEDED
Status: DISCONTINUED | OUTPATIENT
Start: 2018-01-02 | End: 2018-01-07 | Stop reason: HOSPADM

## 2018-01-02 RX ORDER — PANTOPRAZOLE SODIUM 40 MG/1
40 TABLET, DELAYED RELEASE ORAL EVERY MORNING
Status: DISCONTINUED | OUTPATIENT
Start: 2018-01-02 | End: 2018-01-07 | Stop reason: HOSPADM

## 2018-01-02 RX ORDER — ACETAMINOPHEN 325 MG/1
650 TABLET ORAL EVERY 4 HOURS PRN
Status: DISCONTINUED | OUTPATIENT
Start: 2018-01-02 | End: 2018-01-07 | Stop reason: HOSPADM

## 2018-01-02 RX ORDER — CYCLOBENZAPRINE HCL 10 MG
10 TABLET ORAL NIGHTLY PRN
Status: CANCELLED | OUTPATIENT
Start: 2018-01-02

## 2018-01-02 RX ORDER — ONDANSETRON 4 MG/1
4 TABLET, ORALLY DISINTEGRATING ORAL EVERY 6 HOURS PRN
Status: DISCONTINUED | OUTPATIENT
Start: 2018-01-02 | End: 2018-01-07 | Stop reason: HOSPADM

## 2018-01-02 RX ADMIN — TAZOBACTAM SODIUM AND PIPERACILLIN SODIUM 3.38 G: 375; 3 INJECTION, SOLUTION INTRAVENOUS at 11:03

## 2018-01-02 RX ADMIN — IPRATROPIUM BROMIDE AND ALBUTEROL SULFATE 3 ML: .5; 3 SOLUTION RESPIRATORY (INHALATION) at 20:57

## 2018-01-02 RX ADMIN — ACETAMINOPHEN 650 MG: 325 TABLET ORAL at 17:55

## 2018-01-02 RX ADMIN — ROPINIROLE 2 MG: 2 TABLET, FILM COATED ORAL at 20:59

## 2018-01-02 RX ADMIN — TAZOBACTAM SODIUM AND PIPERACILLIN SODIUM 3.38 G: 375; 3 INJECTION, SOLUTION INTRAVENOUS at 16:54

## 2018-01-02 RX ADMIN — ONDANSETRON 4 MG: 4 TABLET, ORALLY DISINTEGRATING ORAL at 11:33

## 2018-01-02 RX ADMIN — POTASSIUM CHLORIDE 40 MEQ: 750 CAPSULE, EXTENDED RELEASE ORAL at 20:59

## 2018-01-02 RX ADMIN — FUROSEMIDE 40 MG: 40 TABLET ORAL at 18:03

## 2018-01-02 RX ADMIN — FUROSEMIDE 40 MG: 10 INJECTION, SOLUTION INTRAMUSCULAR; INTRAVENOUS at 11:00

## 2018-01-02 RX ADMIN — HYDROCODONE BITARTRATE AND ACETAMINOPHEN 1 TABLET: 5; 325 TABLET ORAL at 18:56

## 2018-01-02 RX ADMIN — ACETAMINOPHEN 1000 MG: 500 TABLET ORAL at 09:15

## 2018-01-02 RX ADMIN — POTASSIUM CHLORIDE 40 MEQ: 750 CAPSULE, EXTENDED RELEASE ORAL at 10:59

## 2018-01-02 RX ADMIN — HYDROCODONE BITARTRATE AND ACETAMINOPHEN 1 TABLET: 5; 325 TABLET ORAL at 13:54

## 2018-01-02 RX ADMIN — IPRATROPIUM BROMIDE AND ALBUTEROL SULFATE 3 ML: .5; 3 SOLUTION RESPIRATORY (INHALATION) at 16:45

## 2018-01-02 RX ADMIN — TEMAZEPAM 15 MG: 15 CAPSULE ORAL at 20:59

## 2018-01-02 RX ADMIN — IPRATROPIUM BROMIDE AND ALBUTEROL SULFATE 3 ML: .5; 3 SOLUTION RESPIRATORY (INHALATION) at 11:08

## 2018-01-02 RX ADMIN — ACETAMINOPHEN 650 MG: 325 TABLET ORAL at 23:23

## 2018-01-02 RX ADMIN — ENOXAPARIN SODIUM 40 MG: 40 INJECTION SUBCUTANEOUS at 13:54

## 2018-01-02 RX ADMIN — BENZONATATE 100 MG: 100 CAPSULE ORAL at 16:53

## 2018-01-02 RX ADMIN — CARVEDILOL 6.25 MG: 6.25 TABLET, FILM COATED ORAL at 13:58

## 2018-01-02 NOTE — TELEPHONE ENCOUNTER
Attempt call to home # - rings without answer.  Cell # does not accept incoming calls.  Will try back.

## 2018-01-02 NOTE — TELEPHONE ENCOUNTER
----- Message from Cleveland Devine MD sent at 1/1/2018  9:35 AM EST -----  Tell her that her CBC from 12/29/17 looked better that one week prior with a higher Hgb of 11.8 (though she is still mildly anemic). Continue taking iron pills BID and constinue getting weekly CBC's. [email or web address]

## 2018-01-03 LAB
ANION GAP SERPL CALCULATED.3IONS-SCNC: 12 MMOL/L
BUN BLD-MCNC: 15 MG/DL (ref 8–23)
BUN/CREAT SERPL: 12.2 (ref 7–25)
CALCIUM SPEC-SCNC: 8.9 MG/DL (ref 8.6–10.5)
CHLORIDE SERPL-SCNC: 98 MMOL/L (ref 98–107)
CO2 SERPL-SCNC: 34 MMOL/L (ref 22–29)
CREAT BLD-MCNC: 1.23 MG/DL (ref 0.57–1)
DEPRECATED RDW RBC AUTO: 50 FL (ref 37–54)
ERYTHROCYTE [DISTWIDTH] IN BLOOD BY AUTOMATED COUNT: 14.8 % (ref 11.7–13)
GFR SERPL CREATININE-BSD FRML MDRD: 44 ML/MIN/1.73
GLUCOSE BLD-MCNC: 114 MG/DL (ref 65–99)
HCT VFR BLD AUTO: 33.6 % (ref 35.6–45.5)
HGB BLD-MCNC: 10.1 G/DL (ref 11.9–15.5)
MCH RBC QN AUTO: 28.1 PG (ref 26.9–32)
MCHC RBC AUTO-ENTMCNC: 30.1 G/DL (ref 32.4–36.3)
MCV RBC AUTO: 93.3 FL (ref 80.5–98.2)
PLATELET # BLD AUTO: 168 10*3/MM3 (ref 140–500)
PMV BLD AUTO: 10.8 FL (ref 6–12)
POTASSIUM BLD-SCNC: 3.9 MMOL/L (ref 3.5–5.2)
RBC # BLD AUTO: 3.6 10*6/MM3 (ref 3.9–5.2)
SODIUM BLD-SCNC: 144 MMOL/L (ref 136–145)
WBC NRBC COR # BLD: 9.56 10*3/MM3 (ref 4.5–10.7)

## 2018-01-03 PROCEDURE — 25010000002 PIPERACILLIN SOD-TAZOBACTAM PER 1 G: Performed by: INTERNAL MEDICINE

## 2018-01-03 PROCEDURE — 25010000002 METHYLPREDNISOLONE PER 125 MG: Performed by: INTERNAL MEDICINE

## 2018-01-03 PROCEDURE — 25010000002 ENOXAPARIN PER 10 MG: Performed by: INTERNAL MEDICINE

## 2018-01-03 PROCEDURE — 94799 UNLISTED PULMONARY SVC/PX: CPT

## 2018-01-03 PROCEDURE — 85027 COMPLETE CBC AUTOMATED: CPT | Performed by: INTERNAL MEDICINE

## 2018-01-03 PROCEDURE — 80048 BASIC METABOLIC PNL TOTAL CA: CPT | Performed by: INTERNAL MEDICINE

## 2018-01-03 PROCEDURE — 36415 COLL VENOUS BLD VENIPUNCTURE: CPT | Performed by: INTERNAL MEDICINE

## 2018-01-03 RX ORDER — IPRATROPIUM BROMIDE AND ALBUTEROL SULFATE 2.5; .5 MG/3ML; MG/3ML
3 SOLUTION RESPIRATORY (INHALATION)
Status: DISCONTINUED | OUTPATIENT
Start: 2018-01-03 | End: 2018-01-07 | Stop reason: HOSPADM

## 2018-01-03 RX ORDER — SUCRALFATE 1 G/1
1 TABLET ORAL
Status: DISCONTINUED | OUTPATIENT
Start: 2018-01-03 | End: 2018-01-07 | Stop reason: HOSPADM

## 2018-01-03 RX ORDER — METHYLPREDNISOLONE SODIUM SUCCINATE 125 MG/2ML
60 INJECTION, POWDER, LYOPHILIZED, FOR SOLUTION INTRAMUSCULAR; INTRAVENOUS EVERY 6 HOURS
Status: DISCONTINUED | OUTPATIENT
Start: 2018-01-03 | End: 2018-01-04

## 2018-01-03 RX ORDER — ONDANSETRON 4 MG/1
4 TABLET, FILM COATED ORAL EVERY 12 HOURS PRN
Status: ON HOLD | COMMUNITY
End: 2021-05-12 | Stop reason: SDUPTHER

## 2018-01-03 RX ORDER — IBUPROFEN 400 MG/1
400 TABLET ORAL EVERY 8 HOURS PRN
Status: DISCONTINUED | OUTPATIENT
Start: 2018-01-03 | End: 2018-01-07 | Stop reason: HOSPADM

## 2018-01-03 RX ORDER — SUCRALFATE 1 G/1
1 TABLET ORAL 4 TIMES DAILY
COMMUNITY
End: 2018-10-16 | Stop reason: SDUPTHER

## 2018-01-03 RX ADMIN — TAZOBACTAM SODIUM AND PIPERACILLIN SODIUM 3.38 G: 375; 3 INJECTION, SOLUTION INTRAVENOUS at 01:45

## 2018-01-03 RX ADMIN — HYDROCODONE BITARTRATE AND ACETAMINOPHEN 1 TABLET: 5; 325 TABLET ORAL at 10:36

## 2018-01-03 RX ADMIN — METHYLPREDNISOLONE SODIUM SUCCINATE 60 MG: 125 INJECTION, POWDER, FOR SOLUTION INTRAMUSCULAR; INTRAVENOUS at 17:31

## 2018-01-03 RX ADMIN — IPRATROPIUM BROMIDE AND ALBUTEROL SULFATE 3 ML: .5; 3 SOLUTION RESPIRATORY (INHALATION) at 23:32

## 2018-01-03 RX ADMIN — CARVEDILOL 6.25 MG: 6.25 TABLET, FILM COATED ORAL at 17:32

## 2018-01-03 RX ADMIN — FUROSEMIDE 40 MG: 40 TABLET ORAL at 09:14

## 2018-01-03 RX ADMIN — SUCRALFATE 1 G: 1 TABLET ORAL at 20:18

## 2018-01-03 RX ADMIN — POTASSIUM CHLORIDE 40 MEQ: 750 CAPSULE, EXTENDED RELEASE ORAL at 01:45

## 2018-01-03 RX ADMIN — HYDROCODONE BITARTRATE AND ACETAMINOPHEN 1 TABLET: 5; 325 TABLET ORAL at 00:31

## 2018-01-03 RX ADMIN — BENZONATATE 100 MG: 100 CAPSULE ORAL at 17:32

## 2018-01-03 RX ADMIN — HYDROCODONE BITARTRATE AND ACETAMINOPHEN 1 TABLET: 5; 325 TABLET ORAL at 04:32

## 2018-01-03 RX ADMIN — IPRATROPIUM BROMIDE AND ALBUTEROL SULFATE 3 ML: .5; 3 SOLUTION RESPIRATORY (INHALATION) at 07:35

## 2018-01-03 RX ADMIN — ROPINIROLE 2 MG: 2 TABLET, FILM COATED ORAL at 20:18

## 2018-01-03 RX ADMIN — IPRATROPIUM BROMIDE AND ALBUTEROL SULFATE 3 ML: .5; 3 SOLUTION RESPIRATORY (INHALATION) at 19:10

## 2018-01-03 RX ADMIN — FUROSEMIDE 40 MG: 40 TABLET ORAL at 17:32

## 2018-01-03 RX ADMIN — SERTRALINE 100 MG: 100 TABLET, FILM COATED ORAL at 09:14

## 2018-01-03 RX ADMIN — SUCRALFATE 1 G: 1 TABLET ORAL at 17:32

## 2018-01-03 RX ADMIN — HYDROCODONE BITARTRATE AND ACETAMINOPHEN 1 TABLET: 5; 325 TABLET ORAL at 14:50

## 2018-01-03 RX ADMIN — IPRATROPIUM BROMIDE AND ALBUTEROL SULFATE 3 ML: .5; 3 SOLUTION RESPIRATORY (INHALATION) at 11:41

## 2018-01-03 RX ADMIN — PANTOPRAZOLE SODIUM 40 MG: 40 TABLET, DELAYED RELEASE ORAL at 09:13

## 2018-01-03 RX ADMIN — METHYLPREDNISOLONE SODIUM SUCCINATE 60 MG: 125 INJECTION, POWDER, FOR SOLUTION INTRAMUSCULAR; INTRAVENOUS at 20:19

## 2018-01-03 RX ADMIN — TAZOBACTAM SODIUM AND PIPERACILLIN SODIUM 3.38 G: 375; 3 INJECTION, SOLUTION INTRAVENOUS at 17:35

## 2018-01-03 RX ADMIN — TAZOBACTAM SODIUM AND PIPERACILLIN SODIUM 3.38 G: 375; 3 INJECTION, SOLUTION INTRAVENOUS at 09:13

## 2018-01-03 RX ADMIN — CARVEDILOL 6.25 MG: 6.25 TABLET, FILM COATED ORAL at 09:14

## 2018-01-03 RX ADMIN — IPRATROPIUM BROMIDE AND ALBUTEROL SULFATE 3 ML: .5; 3 SOLUTION RESPIRATORY (INHALATION) at 14:37

## 2018-01-03 RX ADMIN — ENOXAPARIN SODIUM 40 MG: 40 INJECTION SUBCUTANEOUS at 10:38

## 2018-01-03 RX ADMIN — TEMAZEPAM 15 MG: 15 CAPSULE ORAL at 20:28

## 2018-01-03 RX ADMIN — HYDROCODONE BITARTRATE AND ACETAMINOPHEN 1 TABLET: 5; 325 TABLET ORAL at 20:18

## 2018-01-04 ENCOUNTER — TELEPHONE (OUTPATIENT)
Dept: FAMILY MEDICINE CLINIC | Facility: CLINIC | Age: 63
End: 2018-01-04

## 2018-01-04 LAB
ANION GAP SERPL CALCULATED.3IONS-SCNC: 10.8 MMOL/L
BACTERIA SPEC RESP CULT: NORMAL
BACTERIA SPEC RESP CULT: NORMAL
BUN BLD-MCNC: 15 MG/DL (ref 8–23)
BUN/CREAT SERPL: 17 (ref 7–25)
CALCIUM SPEC-SCNC: 9 MG/DL (ref 8.6–10.5)
CHLORIDE SERPL-SCNC: 94 MMOL/L (ref 98–107)
CO2 SERPL-SCNC: 32.2 MMOL/L (ref 22–29)
CREAT BLD-MCNC: 0.88 MG/DL (ref 0.57–1)
DEPRECATED RDW RBC AUTO: 47.2 FL (ref 37–54)
ERYTHROCYTE [DISTWIDTH] IN BLOOD BY AUTOMATED COUNT: 14.2 % (ref 11.7–13)
GFR SERPL CREATININE-BSD FRML MDRD: 65 ML/MIN/1.73
GLUCOSE BLD-MCNC: 141 MG/DL (ref 65–99)
GRAM STN SPEC: NORMAL
HCT VFR BLD AUTO: 30.6 % (ref 35.6–45.5)
HGB BLD-MCNC: 9.3 G/DL (ref 11.9–15.5)
MCH RBC QN AUTO: 28 PG (ref 26.9–32)
MCHC RBC AUTO-ENTMCNC: 30.4 G/DL (ref 32.4–36.3)
MCV RBC AUTO: 92.2 FL (ref 80.5–98.2)
PLATELET # BLD AUTO: 162 10*3/MM3 (ref 140–500)
PMV BLD AUTO: 10.8 FL (ref 6–12)
POTASSIUM BLD-SCNC: 3.6 MMOL/L (ref 3.5–5.2)
RBC # BLD AUTO: 3.32 10*6/MM3 (ref 3.9–5.2)
SODIUM BLD-SCNC: 137 MMOL/L (ref 136–145)
WBC NRBC COR # BLD: 5.81 10*3/MM3 (ref 4.5–10.7)

## 2018-01-04 PROCEDURE — 25010000002 ENOXAPARIN PER 10 MG: Performed by: INTERNAL MEDICINE

## 2018-01-04 PROCEDURE — 94799 UNLISTED PULMONARY SVC/PX: CPT

## 2018-01-04 PROCEDURE — 25010000002 METHYLPREDNISOLONE PER 40 MG: Performed by: INTERNAL MEDICINE

## 2018-01-04 PROCEDURE — 25010000002 METHYLPREDNISOLONE PER 125 MG: Performed by: INTERNAL MEDICINE

## 2018-01-04 PROCEDURE — 25010000002 PIPERACILLIN SOD-TAZOBACTAM PER 1 G: Performed by: INTERNAL MEDICINE

## 2018-01-04 PROCEDURE — 25010000002 ONDANSETRON PER 1 MG: Performed by: INTERNAL MEDICINE

## 2018-01-04 PROCEDURE — 80048 BASIC METABOLIC PNL TOTAL CA: CPT | Performed by: INTERNAL MEDICINE

## 2018-01-04 PROCEDURE — 85027 COMPLETE CBC AUTOMATED: CPT | Performed by: INTERNAL MEDICINE

## 2018-01-04 RX ORDER — CARVEDILOL 6.25 MG/1
6.25 TABLET ORAL 2 TIMES DAILY WITH MEALS
COMMUNITY
End: 2018-12-11

## 2018-01-04 RX ORDER — SERTRALINE HYDROCHLORIDE 100 MG/1
100 TABLET, FILM COATED ORAL DAILY
COMMUNITY
End: 2019-01-14 | Stop reason: SDUPTHER

## 2018-01-04 RX ORDER — METHYLPREDNISOLONE SODIUM SUCCINATE 40 MG/ML
40 INJECTION, POWDER, LYOPHILIZED, FOR SOLUTION INTRAMUSCULAR; INTRAVENOUS EVERY 12 HOURS
Status: DISCONTINUED | OUTPATIENT
Start: 2018-01-04 | End: 2018-01-07 | Stop reason: HOSPADM

## 2018-01-04 RX ORDER — ALBUTEROL SULFATE 90 UG/1
2 AEROSOL, METERED RESPIRATORY (INHALATION) EVERY 4 HOURS PRN
Status: ON HOLD | COMMUNITY
End: 2018-01-04

## 2018-01-04 RX ORDER — POTASSIUM CHLORIDE 750 MG/1
20 TABLET, FILM COATED, EXTENDED RELEASE ORAL 2 TIMES DAILY
COMMUNITY
End: 2018-05-03 | Stop reason: SDUPTHER

## 2018-01-04 RX ORDER — FERROUS SULFATE 325(65) MG
325 TABLET ORAL 2 TIMES DAILY WITH MEALS
Status: DISCONTINUED | OUTPATIENT
Start: 2018-01-04 | End: 2018-01-07 | Stop reason: HOSPADM

## 2018-01-04 RX ORDER — HYDROCODONE BITARTRATE AND ACETAMINOPHEN 5; 325 MG/1; MG/1
1 TABLET ORAL EVERY 4 HOURS PRN
Qty: 90 TABLET | Refills: 0 | Status: SHIPPED | OUTPATIENT
Start: 2018-01-04 | End: 2018-02-05 | Stop reason: SDUPTHER

## 2018-01-04 RX ORDER — TEMAZEPAM 30 MG/1
30 CAPSULE ORAL
COMMUNITY
End: 2018-05-16 | Stop reason: SDUPTHER

## 2018-01-04 RX ADMIN — FUROSEMIDE 40 MG: 40 TABLET ORAL at 18:51

## 2018-01-04 RX ADMIN — TAZOBACTAM SODIUM AND PIPERACILLIN SODIUM 3.38 G: 375; 3 INJECTION, SOLUTION INTRAVENOUS at 16:16

## 2018-01-04 RX ADMIN — METHYLPREDNISOLONE SODIUM SUCCINATE 60 MG: 125 INJECTION, POWDER, FOR SOLUTION INTRAMUSCULAR; INTRAVENOUS at 09:19

## 2018-01-04 RX ADMIN — ONDANSETRON 4 MG: 2 INJECTION INTRAMUSCULAR; INTRAVENOUS at 23:15

## 2018-01-04 RX ADMIN — IPRATROPIUM BROMIDE AND ALBUTEROL SULFATE 3 ML: .5; 3 SOLUTION RESPIRATORY (INHALATION) at 14:32

## 2018-01-04 RX ADMIN — ACETAMINOPHEN 650 MG: 325 TABLET ORAL at 12:01

## 2018-01-04 RX ADMIN — FERROUS SULFATE TAB 325 MG (65 MG ELEMENTAL FE) 325 MG: 325 (65 FE) TAB at 17:20

## 2018-01-04 RX ADMIN — HYDROCODONE BITARTRATE AND ACETAMINOPHEN 1 TABLET: 5; 325 TABLET ORAL at 05:28

## 2018-01-04 RX ADMIN — ROPINIROLE 2 MG: 2 TABLET, FILM COATED ORAL at 23:04

## 2018-01-04 RX ADMIN — HYDROCODONE BITARTRATE AND ACETAMINOPHEN 1 TABLET: 5; 325 TABLET ORAL at 09:35

## 2018-01-04 RX ADMIN — SERTRALINE 100 MG: 100 TABLET, FILM COATED ORAL at 09:19

## 2018-01-04 RX ADMIN — SUCRALFATE 1 G: 1 TABLET ORAL at 17:20

## 2018-01-04 RX ADMIN — FUROSEMIDE 40 MG: 40 TABLET ORAL at 09:20

## 2018-01-04 RX ADMIN — SUCRALFATE 1 G: 1 TABLET ORAL at 09:19

## 2018-01-04 RX ADMIN — TEMAZEPAM 15 MG: 15 CAPSULE ORAL at 23:16

## 2018-01-04 RX ADMIN — HYDROCODONE BITARTRATE AND ACETAMINOPHEN 1 TABLET: 5; 325 TABLET ORAL at 18:22

## 2018-01-04 RX ADMIN — TAZOBACTAM SODIUM AND PIPERACILLIN SODIUM 3.38 G: 375; 3 INJECTION, SOLUTION INTRAVENOUS at 00:33

## 2018-01-04 RX ADMIN — IPRATROPIUM BROMIDE AND ALBUTEROL SULFATE 3 ML: .5; 3 SOLUTION RESPIRATORY (INHALATION) at 07:58

## 2018-01-04 RX ADMIN — ENOXAPARIN SODIUM 40 MG: 40 INJECTION SUBCUTANEOUS at 10:26

## 2018-01-04 RX ADMIN — CARVEDILOL 6.25 MG: 6.25 TABLET, FILM COATED ORAL at 09:20

## 2018-01-04 RX ADMIN — IPRATROPIUM BROMIDE AND ALBUTEROL SULFATE 3 ML: .5; 3 SOLUTION RESPIRATORY (INHALATION) at 10:24

## 2018-01-04 RX ADMIN — HYDROCODONE BITARTRATE AND ACETAMINOPHEN 1 TABLET: 5; 325 TABLET ORAL at 14:25

## 2018-01-04 RX ADMIN — TAZOBACTAM SODIUM AND PIPERACILLIN SODIUM 3.38 G: 375; 3 INJECTION, SOLUTION INTRAVENOUS at 09:35

## 2018-01-04 RX ADMIN — HYDROCODONE BITARTRATE AND ACETAMINOPHEN 1 TABLET: 5; 325 TABLET ORAL at 23:02

## 2018-01-04 RX ADMIN — SUCRALFATE 1 G: 1 TABLET ORAL at 23:04

## 2018-01-04 RX ADMIN — ONDANSETRON 4 MG: 2 INJECTION INTRAMUSCULAR; INTRAVENOUS at 18:28

## 2018-01-04 RX ADMIN — BISACODYL 5 MG: 5 TABLET, COATED ORAL at 16:15

## 2018-01-04 RX ADMIN — METHYLPREDNISOLONE SODIUM SUCCINATE 60 MG: 125 INJECTION, POWDER, FOR SOLUTION INTRAMUSCULAR; INTRAVENOUS at 01:32

## 2018-01-04 RX ADMIN — CARVEDILOL 6.25 MG: 6.25 TABLET, FILM COATED ORAL at 17:20

## 2018-01-04 RX ADMIN — HYDROCODONE BITARTRATE AND ACETAMINOPHEN 1 TABLET: 5; 325 TABLET ORAL at 00:32

## 2018-01-04 RX ADMIN — SUCRALFATE 1 G: 1 TABLET ORAL at 11:55

## 2018-01-04 RX ADMIN — ONDANSETRON 4 MG: 2 INJECTION INTRAMUSCULAR; INTRAVENOUS at 12:01

## 2018-01-04 RX ADMIN — POLYETHYLENE GLYCOL (3350) 17 G: 17 POWDER, FOR SOLUTION ORAL at 09:20

## 2018-01-04 RX ADMIN — METHYLPREDNISOLONE SODIUM SUCCINATE 40 MG: 40 INJECTION, POWDER, FOR SOLUTION INTRAMUSCULAR; INTRAVENOUS at 23:04

## 2018-01-04 RX ADMIN — PANTOPRAZOLE SODIUM 40 MG: 40 TABLET, DELAYED RELEASE ORAL at 06:42

## 2018-01-05 ENCOUNTER — APPOINTMENT (OUTPATIENT)
Dept: GENERAL RADIOLOGY | Facility: HOSPITAL | Age: 63
End: 2018-01-05

## 2018-01-05 ENCOUNTER — RESULTS ENCOUNTER (OUTPATIENT)
Dept: GASTROENTEROLOGY | Facility: CLINIC | Age: 63
End: 2018-01-05

## 2018-01-05 DIAGNOSIS — D64.9 ANEMIA, UNSPECIFIED TYPE: ICD-10-CM

## 2018-01-05 PROCEDURE — 94799 UNLISTED PULMONARY SVC/PX: CPT

## 2018-01-05 PROCEDURE — 25010000002 ONDANSETRON PER 1 MG: Performed by: INTERNAL MEDICINE

## 2018-01-05 PROCEDURE — 25010000002 METHYLPREDNISOLONE PER 40 MG: Performed by: INTERNAL MEDICINE

## 2018-01-05 PROCEDURE — 25010000002 PIPERACILLIN SOD-TAZOBACTAM PER 1 G: Performed by: INTERNAL MEDICINE

## 2018-01-05 PROCEDURE — 71046 X-RAY EXAM CHEST 2 VIEWS: CPT

## 2018-01-05 PROCEDURE — 25010000002 ENOXAPARIN PER 10 MG: Performed by: INTERNAL MEDICINE

## 2018-01-05 RX ORDER — BENZONATATE 100 MG/1
200 CAPSULE ORAL 3 TIMES DAILY PRN
Status: DISCONTINUED | OUTPATIENT
Start: 2018-01-05 | End: 2018-01-07 | Stop reason: HOSPADM

## 2018-01-05 RX ORDER — ECHINACEA PURPUREA EXTRACT 125 MG
2 TABLET ORAL AS NEEDED
Status: DISCONTINUED | OUTPATIENT
Start: 2018-01-05 | End: 2018-01-07 | Stop reason: HOSPADM

## 2018-01-05 RX ADMIN — SERTRALINE 100 MG: 100 TABLET, FILM COATED ORAL at 08:17

## 2018-01-05 RX ADMIN — TAZOBACTAM SODIUM AND PIPERACILLIN SODIUM 3.38 G: 375; 3 INJECTION, SOLUTION INTRAVENOUS at 01:29

## 2018-01-05 RX ADMIN — HYDROCODONE BITARTRATE AND ACETAMINOPHEN 1 TABLET: 5; 325 TABLET ORAL at 12:16

## 2018-01-05 RX ADMIN — IPRATROPIUM BROMIDE AND ALBUTEROL SULFATE 3 ML: .5; 3 SOLUTION RESPIRATORY (INHALATION) at 11:39

## 2018-01-05 RX ADMIN — CARVEDILOL 6.25 MG: 6.25 TABLET, FILM COATED ORAL at 17:03

## 2018-01-05 RX ADMIN — HYDROCODONE BITARTRATE AND ACETAMINOPHEN 1 TABLET: 5; 325 TABLET ORAL at 21:47

## 2018-01-05 RX ADMIN — ONDANSETRON 4 MG: 2 INJECTION INTRAMUSCULAR; INTRAVENOUS at 12:16

## 2018-01-05 RX ADMIN — METHYLPREDNISOLONE SODIUM SUCCINATE 40 MG: 40 INJECTION, POWDER, FOR SOLUTION INTRAMUSCULAR; INTRAVENOUS at 21:37

## 2018-01-05 RX ADMIN — PANTOPRAZOLE SODIUM 40 MG: 40 TABLET, DELAYED RELEASE ORAL at 07:38

## 2018-01-05 RX ADMIN — CARVEDILOL 6.25 MG: 6.25 TABLET, FILM COATED ORAL at 08:17

## 2018-01-05 RX ADMIN — TAZOBACTAM SODIUM AND PIPERACILLIN SODIUM 3.38 G: 375; 3 INJECTION, SOLUTION INTRAVENOUS at 08:17

## 2018-01-05 RX ADMIN — IPRATROPIUM BROMIDE AND ALBUTEROL SULFATE 3 ML: .5; 3 SOLUTION RESPIRATORY (INHALATION) at 21:11

## 2018-01-05 RX ADMIN — IPRATROPIUM BROMIDE AND ALBUTEROL SULFATE 3 ML: .5; 3 SOLUTION RESPIRATORY (INHALATION) at 00:49

## 2018-01-05 RX ADMIN — POLYETHYLENE GLYCOL (3350) 17 G: 17 POWDER, FOR SOLUTION ORAL at 09:08

## 2018-01-05 RX ADMIN — IPRATROPIUM BROMIDE AND ALBUTEROL SULFATE 3 ML: .5; 3 SOLUTION RESPIRATORY (INHALATION) at 08:09

## 2018-01-05 RX ADMIN — METHYLPREDNISOLONE SODIUM SUCCINATE 40 MG: 40 INJECTION, POWDER, FOR SOLUTION INTRAMUSCULAR; INTRAVENOUS at 09:08

## 2018-01-05 RX ADMIN — FUROSEMIDE 40 MG: 40 TABLET ORAL at 08:17

## 2018-01-05 RX ADMIN — FUROSEMIDE 40 MG: 40 TABLET ORAL at 17:03

## 2018-01-05 RX ADMIN — ENOXAPARIN SODIUM 40 MG: 40 INJECTION SUBCUTANEOUS at 10:56

## 2018-01-05 RX ADMIN — SUCRALFATE 1 G: 1 TABLET ORAL at 10:56

## 2018-01-05 RX ADMIN — TEMAZEPAM 15 MG: 15 CAPSULE ORAL at 21:47

## 2018-01-05 RX ADMIN — SUCRALFATE 1 G: 1 TABLET ORAL at 17:03

## 2018-01-05 RX ADMIN — ROPINIROLE 2 MG: 2 TABLET, FILM COATED ORAL at 21:37

## 2018-01-05 RX ADMIN — SUCRALFATE 1 G: 1 TABLET ORAL at 07:38

## 2018-01-05 RX ADMIN — FERROUS SULFATE TAB 325 MG (65 MG ELEMENTAL FE) 325 MG: 325 (65 FE) TAB at 17:03

## 2018-01-05 RX ADMIN — ONDANSETRON 4 MG: 2 INJECTION INTRAMUSCULAR; INTRAVENOUS at 18:16

## 2018-01-05 RX ADMIN — TAZOBACTAM SODIUM AND PIPERACILLIN SODIUM 3.38 G: 375; 3 INJECTION, SOLUTION INTRAVENOUS at 17:03

## 2018-01-05 RX ADMIN — SUCRALFATE 1 G: 1 TABLET ORAL at 21:37

## 2018-01-05 RX ADMIN — IPRATROPIUM BROMIDE AND ALBUTEROL SULFATE 3 ML: .5; 3 SOLUTION RESPIRATORY (INHALATION) at 04:07

## 2018-01-05 RX ADMIN — FERROUS SULFATE TAB 325 MG (65 MG ELEMENTAL FE) 325 MG: 325 (65 FE) TAB at 08:17

## 2018-01-05 RX ADMIN — IPRATROPIUM BROMIDE AND ALBUTEROL SULFATE 3 ML: .5; 3 SOLUTION RESPIRATORY (INHALATION) at 15:27

## 2018-01-05 RX ADMIN — HYDROCODONE BITARTRATE AND ACETAMINOPHEN 1 TABLET: 5; 325 TABLET ORAL at 03:56

## 2018-01-05 RX ADMIN — HYDROCODONE BITARTRATE AND ACETAMINOPHEN 1 TABLET: 5; 325 TABLET ORAL at 17:03

## 2018-01-05 RX ADMIN — BISACODYL 10 MG: 10 SUPPOSITORY RECTAL at 00:26

## 2018-01-05 RX ADMIN — HYDROCODONE BITARTRATE AND ACETAMINOPHEN 1 TABLET: 5; 325 TABLET ORAL at 07:50

## 2018-01-06 PROCEDURE — 25010000002 PIPERACILLIN SOD-TAZOBACTAM PER 1 G: Performed by: INTERNAL MEDICINE

## 2018-01-06 PROCEDURE — 94799 UNLISTED PULMONARY SVC/PX: CPT

## 2018-01-06 PROCEDURE — 25010000002 ONDANSETRON PER 1 MG: Performed by: INTERNAL MEDICINE

## 2018-01-06 PROCEDURE — 25010000002 METHYLPREDNISOLONE PER 40 MG: Performed by: INTERNAL MEDICINE

## 2018-01-06 PROCEDURE — 25010000002 ENOXAPARIN PER 10 MG: Performed by: INTERNAL MEDICINE

## 2018-01-06 RX ADMIN — SUCRALFATE 1 G: 1 TABLET ORAL at 19:58

## 2018-01-06 RX ADMIN — IPRATROPIUM BROMIDE AND ALBUTEROL SULFATE 3 ML: .5; 3 SOLUTION RESPIRATORY (INHALATION) at 16:53

## 2018-01-06 RX ADMIN — HYDROCODONE BITARTRATE AND ACETAMINOPHEN 1 TABLET: 5; 325 TABLET ORAL at 19:57

## 2018-01-06 RX ADMIN — TAZOBACTAM SODIUM AND PIPERACILLIN SODIUM 3.38 G: 375; 3 INJECTION, SOLUTION INTRAVENOUS at 17:13

## 2018-01-06 RX ADMIN — HYDROCODONE BITARTRATE AND ACETAMINOPHEN 1 TABLET: 5; 325 TABLET ORAL at 07:24

## 2018-01-06 RX ADMIN — ONDANSETRON 4 MG: 2 INJECTION INTRAMUSCULAR; INTRAVENOUS at 19:57

## 2018-01-06 RX ADMIN — IPRATROPIUM BROMIDE AND ALBUTEROL SULFATE 3 ML: .5; 3 SOLUTION RESPIRATORY (INHALATION) at 12:56

## 2018-01-06 RX ADMIN — HYDROCODONE BITARTRATE AND ACETAMINOPHEN 1 TABLET: 5; 325 TABLET ORAL at 15:51

## 2018-01-06 RX ADMIN — SUCRALFATE 1 G: 1 TABLET ORAL at 11:38

## 2018-01-06 RX ADMIN — TAZOBACTAM SODIUM AND PIPERACILLIN SODIUM 3.38 G: 375; 3 INJECTION, SOLUTION INTRAVENOUS at 09:24

## 2018-01-06 RX ADMIN — METHYLPREDNISOLONE SODIUM SUCCINATE 40 MG: 40 INJECTION, POWDER, FOR SOLUTION INTRAMUSCULAR; INTRAVENOUS at 09:22

## 2018-01-06 RX ADMIN — FERROUS SULFATE TAB 325 MG (65 MG ELEMENTAL FE) 325 MG: 325 (65 FE) TAB at 17:13

## 2018-01-06 RX ADMIN — SUCRALFATE 1 G: 1 TABLET ORAL at 17:13

## 2018-01-06 RX ADMIN — METHYLPREDNISOLONE SODIUM SUCCINATE 40 MG: 40 INJECTION, POWDER, FOR SOLUTION INTRAMUSCULAR; INTRAVENOUS at 19:57

## 2018-01-06 RX ADMIN — SUCRALFATE 1 G: 1 TABLET ORAL at 09:22

## 2018-01-06 RX ADMIN — PANTOPRAZOLE SODIUM 40 MG: 40 TABLET, DELAYED RELEASE ORAL at 09:23

## 2018-01-06 RX ADMIN — HYDROCODONE BITARTRATE AND ACETAMINOPHEN 1 TABLET: 5; 325 TABLET ORAL at 11:38

## 2018-01-06 RX ADMIN — ROPINIROLE 2 MG: 2 TABLET, FILM COATED ORAL at 19:58

## 2018-01-06 RX ADMIN — FUROSEMIDE 40 MG: 40 TABLET ORAL at 09:22

## 2018-01-06 RX ADMIN — FUROSEMIDE 40 MG: 40 TABLET ORAL at 17:13

## 2018-01-06 RX ADMIN — IPRATROPIUM BROMIDE AND ALBUTEROL SULFATE 3 ML: .5; 3 SOLUTION RESPIRATORY (INHALATION) at 03:40

## 2018-01-06 RX ADMIN — POLYETHYLENE GLYCOL (3350) 17 G: 17 POWDER, FOR SOLUTION ORAL at 09:23

## 2018-01-06 RX ADMIN — IPRATROPIUM BROMIDE AND ALBUTEROL SULFATE 3 ML: .5; 3 SOLUTION RESPIRATORY (INHALATION) at 20:07

## 2018-01-06 RX ADMIN — CARVEDILOL 6.25 MG: 6.25 TABLET, FILM COATED ORAL at 09:22

## 2018-01-06 RX ADMIN — SERTRALINE 100 MG: 100 TABLET, FILM COATED ORAL at 09:23

## 2018-01-06 RX ADMIN — ONDANSETRON 4 MG: 2 INJECTION INTRAMUSCULAR; INTRAVENOUS at 11:38

## 2018-01-06 RX ADMIN — IPRATROPIUM BROMIDE AND ALBUTEROL SULFATE 3 ML: .5; 3 SOLUTION RESPIRATORY (INHALATION) at 09:17

## 2018-01-06 RX ADMIN — ENOXAPARIN SODIUM 40 MG: 40 INJECTION SUBCUTANEOUS at 11:38

## 2018-01-06 RX ADMIN — FERROUS SULFATE TAB 325 MG (65 MG ELEMENTAL FE) 325 MG: 325 (65 FE) TAB at 09:22

## 2018-01-06 RX ADMIN — CARVEDILOL 6.25 MG: 6.25 TABLET, FILM COATED ORAL at 17:13

## 2018-01-06 RX ADMIN — TAZOBACTAM SODIUM AND PIPERACILLIN SODIUM 3.38 G: 375; 3 INJECTION, SOLUTION INTRAVENOUS at 01:12

## 2018-01-07 VITALS
OXYGEN SATURATION: 97 % | SYSTOLIC BLOOD PRESSURE: 133 MMHG | BODY MASS INDEX: 25.38 KG/M2 | HEART RATE: 61 BPM | TEMPERATURE: 98.1 F | WEIGHT: 161.7 LBS | RESPIRATION RATE: 18 BRPM | DIASTOLIC BLOOD PRESSURE: 68 MMHG | HEIGHT: 67 IN

## 2018-01-07 LAB — BACTERIA SPEC AEROBE CULT: NORMAL

## 2018-01-07 PROCEDURE — 25010000002 PIPERACILLIN SOD-TAZOBACTAM PER 1 G: Performed by: INTERNAL MEDICINE

## 2018-01-07 PROCEDURE — 94799 UNLISTED PULMONARY SVC/PX: CPT

## 2018-01-07 PROCEDURE — 25010000002 METHYLPREDNISOLONE PER 40 MG: Performed by: INTERNAL MEDICINE

## 2018-01-07 PROCEDURE — 25010000002 ENOXAPARIN PER 10 MG: Performed by: INTERNAL MEDICINE

## 2018-01-07 RX ORDER — BENZONATATE 200 MG/1
200 CAPSULE ORAL 3 TIMES DAILY PRN
Qty: 30 CAPSULE | Refills: 0 | Status: SHIPPED | OUTPATIENT
Start: 2018-01-07 | End: 2018-01-23

## 2018-01-07 RX ORDER — PREDNISONE 20 MG/1
TABLET ORAL
Qty: 9 TABLET | Refills: 0 | Status: SHIPPED | OUTPATIENT
Start: 2018-01-07 | End: 2018-02-02

## 2018-01-07 RX ADMIN — CARVEDILOL 6.25 MG: 6.25 TABLET, FILM COATED ORAL at 08:45

## 2018-01-07 RX ADMIN — IPRATROPIUM BROMIDE AND ALBUTEROL SULFATE 3 ML: .5; 3 SOLUTION RESPIRATORY (INHALATION) at 03:37

## 2018-01-07 RX ADMIN — TEMAZEPAM 15 MG: 15 CAPSULE ORAL at 00:06

## 2018-01-07 RX ADMIN — FUROSEMIDE 40 MG: 40 TABLET ORAL at 08:45

## 2018-01-07 RX ADMIN — HYDROCODONE BITARTRATE AND ACETAMINOPHEN 1 TABLET: 5; 325 TABLET ORAL at 11:01

## 2018-01-07 RX ADMIN — TAZOBACTAM SODIUM AND PIPERACILLIN SODIUM 3.38 G: 375; 3 INJECTION, SOLUTION INTRAVENOUS at 01:57

## 2018-01-07 RX ADMIN — ENOXAPARIN SODIUM 40 MG: 40 INJECTION SUBCUTANEOUS at 11:01

## 2018-01-07 RX ADMIN — TAZOBACTAM SODIUM AND PIPERACILLIN SODIUM 3.38 G: 375; 3 INJECTION, SOLUTION INTRAVENOUS at 08:45

## 2018-01-07 RX ADMIN — SUCRALFATE 1 G: 1 TABLET ORAL at 08:45

## 2018-01-07 RX ADMIN — IPRATROPIUM BROMIDE AND ALBUTEROL SULFATE 3 ML: .5; 3 SOLUTION RESPIRATORY (INHALATION) at 00:29

## 2018-01-07 RX ADMIN — POLYETHYLENE GLYCOL (3350) 17 G: 17 POWDER, FOR SOLUTION ORAL at 08:45

## 2018-01-07 RX ADMIN — IPRATROPIUM BROMIDE AND ALBUTEROL SULFATE 3 ML: .5; 3 SOLUTION RESPIRATORY (INHALATION) at 08:28

## 2018-01-07 RX ADMIN — SUCRALFATE 1 G: 1 TABLET ORAL at 11:01

## 2018-01-07 RX ADMIN — HYDROCODONE BITARTRATE AND ACETAMINOPHEN 1 TABLET: 5; 325 TABLET ORAL at 15:11

## 2018-01-07 RX ADMIN — METHYLPREDNISOLONE SODIUM SUCCINATE 40 MG: 40 INJECTION, POWDER, FOR SOLUTION INTRAMUSCULAR; INTRAVENOUS at 08:45

## 2018-01-07 RX ADMIN — SERTRALINE 100 MG: 100 TABLET, FILM COATED ORAL at 08:45

## 2018-01-07 RX ADMIN — IPRATROPIUM BROMIDE AND ALBUTEROL SULFATE 3 ML: .5; 3 SOLUTION RESPIRATORY (INHALATION) at 11:59

## 2018-01-07 RX ADMIN — HYDROCODONE BITARTRATE AND ACETAMINOPHEN 1 TABLET: 5; 325 TABLET ORAL at 00:06

## 2018-01-07 RX ADMIN — FERROUS SULFATE TAB 325 MG (65 MG ELEMENTAL FE) 325 MG: 325 (65 FE) TAB at 08:45

## 2018-01-07 RX ADMIN — PANTOPRAZOLE SODIUM 40 MG: 40 TABLET, DELAYED RELEASE ORAL at 06:08

## 2018-01-07 RX ADMIN — HYDROCODONE BITARTRATE AND ACETAMINOPHEN 1 TABLET: 5; 325 TABLET ORAL at 06:08

## 2018-01-09 ENCOUNTER — TELEPHONE (OUTPATIENT)
Dept: FAMILY MEDICINE CLINIC | Facility: CLINIC | Age: 63
End: 2018-01-09

## 2018-01-11 NOTE — TELEPHONE ENCOUNTER
Called pt and spoke with pt's  with pt's permission and advised per Dr Devine that her cbc from 12/29/2017 looked better than the one week prior with a higher hgb of 11.8( though she is till mildly anemic.  He recommends to continue iron pills bid and continue getting weekly cbc's.     Pt's  verb understanding, but reports his wife is ill with pneumonia.  She will get her labs rechecked when she feels better.   Update sent to Dr Devine.

## 2018-01-12 ENCOUNTER — RESULTS ENCOUNTER (OUTPATIENT)
Dept: GASTROENTEROLOGY | Facility: CLINIC | Age: 63
End: 2018-01-12

## 2018-01-12 DIAGNOSIS — D64.9 ANEMIA, UNSPECIFIED TYPE: ICD-10-CM

## 2018-01-12 RX ORDER — CYCLOBENZAPRINE HCL 10 MG
TABLET ORAL
Qty: 90 TABLET | Refills: 3 | Status: SHIPPED | OUTPATIENT
Start: 2018-01-12 | End: 2018-01-23 | Stop reason: SDUPTHER

## 2018-01-19 ENCOUNTER — RESULTS ENCOUNTER (OUTPATIENT)
Dept: GASTROENTEROLOGY | Facility: CLINIC | Age: 63
End: 2018-01-19

## 2018-01-19 DIAGNOSIS — D64.9 ANEMIA, UNSPECIFIED TYPE: ICD-10-CM

## 2018-01-23 ENCOUNTER — TELEPHONE (OUTPATIENT)
Dept: FAMILY MEDICINE CLINIC | Facility: CLINIC | Age: 63
End: 2018-01-23

## 2018-01-23 ENCOUNTER — OFFICE VISIT (OUTPATIENT)
Dept: FAMILY MEDICINE CLINIC | Facility: CLINIC | Age: 63
End: 2018-01-23

## 2018-01-23 VITALS
HEART RATE: 79 BPM | OXYGEN SATURATION: 96 % | WEIGHT: 163 LBS | BODY MASS INDEX: 25.58 KG/M2 | HEIGHT: 67 IN | TEMPERATURE: 98 F | DIASTOLIC BLOOD PRESSURE: 76 MMHG | SYSTOLIC BLOOD PRESSURE: 128 MMHG

## 2018-01-23 DIAGNOSIS — J44.1 CHRONIC OBSTRUCTIVE PULMONARY DISEASE WITH ACUTE EXACERBATION (HCC): ICD-10-CM

## 2018-01-23 DIAGNOSIS — J21.0 RSV (ACUTE BRONCHIOLITIS DUE TO RESPIRATORY SYNCYTIAL VIRUS): ICD-10-CM

## 2018-01-23 DIAGNOSIS — D72.829 LEUKOCYTOSIS, UNSPECIFIED TYPE: Primary | ICD-10-CM

## 2018-01-23 DIAGNOSIS — J18.9 PNEUMONIA OF RIGHT MIDDLE LOBE DUE TO INFECTIOUS ORGANISM: Primary | ICD-10-CM

## 2018-01-23 DIAGNOSIS — I50.33 ACUTE ON CHRONIC DIASTOLIC CONGESTIVE HEART FAILURE (HCC): ICD-10-CM

## 2018-01-23 DIAGNOSIS — R79.89 ELEVATED SERUM CREATININE: ICD-10-CM

## 2018-01-23 DIAGNOSIS — D50.0 IRON DEFICIENCY ANEMIA DUE TO CHRONIC BLOOD LOSS: ICD-10-CM

## 2018-01-23 LAB
ALBUMIN SERPL-MCNC: 4.6 G/DL (ref 3.5–5.2)
ALBUMIN/GLOB SERPL: 1.5 G/DL
ALP SERPL-CCNC: 85 U/L (ref 39–117)
ALT SERPL W P-5'-P-CCNC: 21 U/L (ref 1–33)
ANION GAP SERPL CALCULATED.3IONS-SCNC: 15 MMOL/L
AST SERPL-CCNC: 20 U/L (ref 1–32)
BASOPHILS # BLD AUTO: 0.03 10*3/MM3 (ref 0–0.2)
BASOPHILS NFR BLD AUTO: 0.2 % (ref 0–1.5)
BILIRUB SERPL-MCNC: 0.8 MG/DL (ref 0.1–1.2)
BUN BLD-MCNC: 37 MG/DL (ref 8–23)
BUN/CREAT SERPL: 20.2 (ref 7–25)
CALCIUM SPEC-SCNC: 10.6 MG/DL (ref 8.6–10.5)
CHLORIDE SERPL-SCNC: 94 MMOL/L (ref 98–107)
CO2 SERPL-SCNC: 28 MMOL/L (ref 22–29)
CREAT BLD-MCNC: 1.83 MG/DL (ref 0.57–1)
DEPRECATED RDW RBC AUTO: 52.7 FL (ref 37–54)
EOSINOPHIL # BLD AUTO: 0.08 10*3/MM3 (ref 0–0.7)
EOSINOPHIL NFR BLD AUTO: 0.4 % (ref 0.3–6.2)
ERYTHROCYTE [DISTWIDTH] IN BLOOD BY AUTOMATED COUNT: 15.4 % (ref 11.7–13)
GFR SERPL CREATININE-BSD FRML MDRD: 28 ML/MIN/1.73
GLOBULIN UR ELPH-MCNC: 3.1 GM/DL
GLUCOSE BLD-MCNC: 92 MG/DL (ref 65–99)
HCT VFR BLD AUTO: 43.4 % (ref 35.6–45.5)
HGB BLD-MCNC: 13.4 G/DL (ref 11.9–15.5)
IMM GRANULOCYTES # BLD: 0.09 10*3/MM3 (ref 0–0.03)
IMM GRANULOCYTES NFR BLD: 0.5 % (ref 0–0.5)
LYMPHOCYTES # BLD AUTO: 1.58 10*3/MM3 (ref 0.9–4.8)
LYMPHOCYTES NFR BLD AUTO: 8.8 % (ref 19.6–45.3)
MCH RBC QN AUTO: 29.2 PG (ref 26.9–32)
MCHC RBC AUTO-ENTMCNC: 30.9 G/DL (ref 32.4–36.3)
MCV RBC AUTO: 94.6 FL (ref 80.5–98.2)
MONOCYTES # BLD AUTO: 0.5 10*3/MM3 (ref 0.2–1.2)
MONOCYTES NFR BLD AUTO: 2.8 % (ref 5–12)
NEUTROPHILS # BLD AUTO: 15.71 10*3/MM3 (ref 1.9–8.1)
NEUTROPHILS NFR BLD AUTO: 87.3 % (ref 42.7–76)
PLAT MORPH BLD: NORMAL
PLATELET # BLD AUTO: 353 10*3/MM3 (ref 140–500)
PMV BLD AUTO: 10.7 FL (ref 6–12)
POTASSIUM BLD-SCNC: 5.3 MMOL/L (ref 3.5–5.2)
PROT SERPL-MCNC: 7.7 G/DL (ref 6–8.5)
RBC # BLD AUTO: 4.59 10*6/MM3 (ref 3.9–5.2)
RBC MORPH BLD: NORMAL
SODIUM BLD-SCNC: 137 MMOL/L (ref 136–145)
WBC MORPH BLD: NORMAL
WBC NRBC COR # BLD: 17.99 10*3/MM3 (ref 4.5–10.7)

## 2018-01-23 PROCEDURE — 85025 COMPLETE CBC W/AUTO DIFF WBC: CPT | Performed by: NURSE PRACTITIONER

## 2018-01-23 PROCEDURE — 85007 BL SMEAR W/DIFF WBC COUNT: CPT | Performed by: NURSE PRACTITIONER

## 2018-01-23 PROCEDURE — 80053 COMPREHEN METABOLIC PANEL: CPT | Performed by: NURSE PRACTITIONER

## 2018-01-23 PROCEDURE — 36415 COLL VENOUS BLD VENIPUNCTURE: CPT | Performed by: NURSE PRACTITIONER

## 2018-01-23 PROCEDURE — 99214 OFFICE O/P EST MOD 30 MIN: CPT | Performed by: NURSE PRACTITIONER

## 2018-01-23 RX ORDER — DOXYCYCLINE HYCLATE 100 MG
100 TABLET ORAL 2 TIMES DAILY
Qty: 20 TABLET | Refills: 0 | Status: SHIPPED | OUTPATIENT
Start: 2018-01-23 | End: 2018-02-02

## 2018-01-23 RX ORDER — GINSENG 100 MG
CAPSULE ORAL 3 TIMES DAILY
Qty: 1 TUBE | Refills: 0 | Status: ON HOLD | OUTPATIENT
Start: 2018-01-23 | End: 2019-06-30

## 2018-01-23 RX ORDER — FUROSEMIDE 20 MG/1
TABLET ORAL
Refills: 1 | COMMUNITY
Start: 2017-12-16 | End: 2018-07-25

## 2018-01-23 NOTE — TELEPHONE ENCOUNTER
WBC elevated with elevated neutrophils, suspect lingering infection, start abx doxycycline 100 BID x 10 days. Your kidney function is decreased, need to increase H20 8 glasses daily and RTC Friday for lab only recheck.

## 2018-01-23 NOTE — PATIENT INSTRUCTIONS
reviewed hospital records, imaging, labs, recheck labs today and call with results, cont all chronic dz meds for HTN, CHF, and COPD, cont FU with pulm, erx bacitracin ointment aaa TID prn nasal sore, consider extending abx or steroid therapy if sx persist or worsen but hold today, increase H20 and rest, Current outpatient and discharge medications have been reconciled for the patient.  Sue Kinsey, APRN

## 2018-01-23 NOTE — PROGRESS NOTES
Subjective   Paz Browne is a 62 y.o. female.     History of Present Illness   Here to FU on recent [facility] admission 01/02/18 for pneumonia R lobe, RSV, acute exacerbation of COPD and CHF, was prev seen at [facility] for suspected pna tx zpack with increasing SOA and wheezing, on 02 NC 2L and had increased up to 3L with CXR showing R middle lobe pna and severe emphysema, sees [facility] Dr Melo and had FU after hospital extended steroid pack 40 mg x 3 days then 30 mg x 3 days then 20 mg x 3 days then 10 mg x 3 days took last pill otday, with cont HA, fatigue and weakness, using oxygen and less SOA coughing and wheezing, no sinus tenderness or ear pain or sore throat, no fevers, taking albuterol, spiriva, advair and nebulizer BID, on ASA, lipitor, coreg 6.25 BID, lasix 40 mg AM 20 mg PM, lisinopril 5 mg, no CP dizziness LE edema, with recent colonoscopy 12/17 and had to cauterize bleeding with last hgb 9.3, last WBC WNL and CXR showing resolving pna at dc  C/o sore nose and bleeding thinks from dry heat and oxygen    The following portions of the patient's history were reviewed and updated as appropriate: allergies, current medications, past family history, past medical history, past social history, past surgical history and problem list.    Review of Systems   Constitutional: Positive for fatigue. Negative for fever.   HENT: Negative for congestion, dental problem, drooling, ear discharge, ear pain, facial swelling, hearing loss, mouth sores, nosebleeds (but sore nose), postnasal drip, rhinorrhea, sinus pain, sinus pressure, sneezing, sore throat, tinnitus, trouble swallowing and voice change.    Respiratory: Negative for apnea, cough, choking, chest tightness, shortness of breath and wheezing.    Cardiovascular: Negative for chest pain, palpitations and leg swelling.   Musculoskeletal: Positive for arthralgias.   Neurological: Positive for weakness and headaches. Negative for dizziness, tremors, seizures,  syncope, facial asymmetry, speech difficulty, light-headedness and numbness.   All other systems reviewed and are negative.      Objective   Physical Exam   Constitutional: She is oriented to person, place, and time. She appears well-developed and well-nourished.   HENT:   Head: Normocephalic and atraumatic.   Right Ear: Hearing and tympanic membrane normal.   Left Ear: Hearing and tympanic membrane normal.   Nose: Nose normal. Right sinus exhibits no maxillary sinus tenderness and no frontal sinus tenderness. Left sinus exhibits no maxillary sinus tenderness and no frontal sinus tenderness.   Mouth/Throat: No oropharyngeal exudate, posterior oropharyngeal edema or posterior oropharyngeal erythema.   Eyes: Conjunctivae and EOM are normal. Pupils are equal, round, and reactive to light.   Neck: Normal range of motion. Neck supple. No thyromegaly present.   Cardiovascular: Normal rate, regular rhythm and normal heart sounds.    Pulmonary/Chest: Effort normal and breath sounds normal.   Wearing NC 02 2L   Musculoskeletal: Normal range of motion.   Lymphadenopathy:     She has no cervical adenopathy.   Neurological: She is alert and oriented to person, place, and time.   Skin: Skin is warm and dry.   Psychiatric: She has a normal mood and affect. Her behavior is normal. Judgment and thought content normal.   Vitals reviewed.      Assessment/Plan   Paz was seen today for follow-up.    Diagnoses and all orders for this visit:    Pneumonia of right middle lobe due to infectious organism  -     Cancel: CBC & Differential  -     Comprehensive Metabolic Panel  -     Cancel: CBC Auto Differential  -     CBC & Differential; Future  -     CBC & Differential  -     CBC Auto Differential    RSV (acute bronchiolitis due to respiratory syncytial virus)  -     Comprehensive Metabolic Panel  -     CBC & Differential; Future  -     CBC & Differential  -     CBC Auto Differential    Iron deficiency anemia due to chronic blood  loss  -     Cancel: CBC & Differential  -     Comprehensive Metabolic Panel  -     Cancel: CBC Auto Differential  -     CBC & Differential; Future  -     CBC & Differential  -     CBC Auto Differential    Acute on chronic diastolic congestive heart failure    Chronic obstructive pulmonary disease with acute exacerbation    Other orders  -     bacitracin 500 UNIT/GM ointment; Apply  topically 3 (Three) Times a Day.    reviewed hospital records, imaging, labs, recheck labs today and call with results, cont all chronic dz meds for HTN, CHF, and COPD, cont FU with pulm, erx bacitracin ointment aaa TID prn nasal sore, consider extending abx or steroid therapy if sx persist or worsen but hold today, increase H20 and rest, Current outpatient and discharge medications have been reconciled for the patient.  Sue Kinsey, APRN

## 2018-01-26 ENCOUNTER — LAB (OUTPATIENT)
Dept: FAMILY MEDICINE CLINIC | Facility: CLINIC | Age: 63
End: 2018-01-26

## 2018-01-26 ENCOUNTER — TELEPHONE (OUTPATIENT)
Dept: FAMILY MEDICINE CLINIC | Facility: CLINIC | Age: 63
End: 2018-01-26

## 2018-01-26 DIAGNOSIS — R79.89 ELEVATED SERUM CREATININE: ICD-10-CM

## 2018-01-26 DIAGNOSIS — D72.829 LEUKOCYTOSIS, UNSPECIFIED TYPE: ICD-10-CM

## 2018-01-26 LAB
ANION GAP SERPL CALCULATED.3IONS-SCNC: 12 MMOL/L
BASOPHILS # BLD AUTO: 0.03 10*3/MM3 (ref 0–0.2)
BASOPHILS NFR BLD AUTO: 0.3 % (ref 0–1.5)
BUN BLD-MCNC: 24 MG/DL (ref 8–23)
BUN/CREAT SERPL: 23.8 (ref 7–25)
CALCIUM SPEC-SCNC: 9.6 MG/DL (ref 8.6–10.5)
CHLORIDE SERPL-SCNC: 99 MMOL/L (ref 98–107)
CO2 SERPL-SCNC: 30 MMOL/L (ref 22–29)
CREAT BLD-MCNC: 1.01 MG/DL (ref 0.57–1)
DEPRECATED RDW RBC AUTO: 51 FL (ref 37–54)
EOSINOPHIL # BLD AUTO: 0.24 10*3/MM3 (ref 0–0.7)
EOSINOPHIL NFR BLD AUTO: 2.6 % (ref 0.3–6.2)
ERYTHROCYTE [DISTWIDTH] IN BLOOD BY AUTOMATED COUNT: 15.1 % (ref 11.7–13)
GFR SERPL CREATININE-BSD FRML MDRD: 55 ML/MIN/1.73
GLUCOSE BLD-MCNC: 109 MG/DL (ref 65–99)
HCT VFR BLD AUTO: 35.7 % (ref 35.6–45.5)
HGB BLD-MCNC: 10.9 G/DL (ref 11.9–15.5)
IMM GRANULOCYTES # BLD: 0.03 10*3/MM3 (ref 0–0.03)
IMM GRANULOCYTES NFR BLD: 0.3 % (ref 0–0.5)
LYMPHOCYTES # BLD AUTO: 1.92 10*3/MM3 (ref 0.9–4.8)
LYMPHOCYTES NFR BLD AUTO: 21.1 % (ref 19.6–45.3)
MCH RBC QN AUTO: 28.8 PG (ref 26.9–32)
MCHC RBC AUTO-ENTMCNC: 30.5 G/DL (ref 32.4–36.3)
MCV RBC AUTO: 94.2 FL (ref 80.5–98.2)
MONOCYTES # BLD AUTO: 0.43 10*3/MM3 (ref 0.2–1.2)
MONOCYTES NFR BLD AUTO: 4.7 % (ref 5–12)
NEUTROPHILS # BLD AUTO: 6.43 10*3/MM3 (ref 1.9–8.1)
NEUTROPHILS NFR BLD AUTO: 71 % (ref 42.7–76)
NRBC BLD MANUAL-RTO: 0 /100 WBC (ref 0–0)
PLATELET # BLD AUTO: 187 10*3/MM3 (ref 140–500)
PMV BLD AUTO: 10.6 FL (ref 6–12)
POTASSIUM BLD-SCNC: 4.4 MMOL/L (ref 3.5–5.2)
RBC # BLD AUTO: 3.79 10*6/MM3 (ref 3.9–5.2)
SODIUM BLD-SCNC: 141 MMOL/L (ref 136–145)
WBC NRBC COR # BLD: 9.08 10*3/MM3 (ref 4.5–10.7)

## 2018-01-26 PROCEDURE — 80048 BASIC METABOLIC PNL TOTAL CA: CPT | Performed by: NURSE PRACTITIONER

## 2018-01-26 PROCEDURE — 36415 COLL VENOUS BLD VENIPUNCTURE: CPT | Performed by: NURSE PRACTITIONER

## 2018-01-26 PROCEDURE — 85025 COMPLETE CBC W/AUTO DIFF WBC: CPT | Performed by: NURSE PRACTITIONER

## 2018-02-02 ENCOUNTER — TELEPHONE (OUTPATIENT)
Dept: FAMILY MEDICINE CLINIC | Facility: CLINIC | Age: 63
End: 2018-02-02

## 2018-02-02 ENCOUNTER — OFFICE VISIT (OUTPATIENT)
Dept: FAMILY MEDICINE CLINIC | Facility: CLINIC | Age: 63
End: 2018-02-02

## 2018-02-02 VITALS
OXYGEN SATURATION: 97 % | BODY MASS INDEX: 26.27 KG/M2 | HEART RATE: 83 BPM | HEIGHT: 67 IN | SYSTOLIC BLOOD PRESSURE: 124 MMHG | DIASTOLIC BLOOD PRESSURE: 70 MMHG | WEIGHT: 167.4 LBS | TEMPERATURE: 97.5 F

## 2018-02-02 DIAGNOSIS — J18.9 PNEUMONIA OF RIGHT MIDDLE LOBE DUE TO INFECTIOUS ORGANISM: ICD-10-CM

## 2018-02-02 DIAGNOSIS — Z00.00 MEDICARE ANNUAL WELLNESS VISIT, INITIAL: Primary | ICD-10-CM

## 2018-02-02 DIAGNOSIS — J21.0 RSV (ACUTE BRONCHIOLITIS DUE TO RESPIRATORY SYNCYTIAL VIRUS): ICD-10-CM

## 2018-02-02 DIAGNOSIS — J44.9 CHRONIC OBSTRUCTIVE PULMONARY DISEASE, UNSPECIFIED COPD TYPE (HCC): ICD-10-CM

## 2018-02-02 LAB
ERYTHROCYTE [DISTWIDTH] IN BLOOD BY AUTOMATED COUNT: 15.9 % (ref 4.5–15)
HCT VFR BLD AUTO: 32.7 % (ref 31–42)
HGB BLD-MCNC: 10.9 G/DL (ref 12–18)
LYMPHOCYTES # BLD AUTO: 1.7 10*3/MM3 (ref 1.2–3.4)
LYMPHOCYTES NFR BLD AUTO: 19.9 % (ref 21–51)
MCH RBC QN AUTO: 29.7 PG (ref 26.1–33.1)
MCHC RBC AUTO-ENTMCNC: 33.3 G/DL (ref 33–37)
MCV RBC AUTO: 89.1 FL (ref 80–99)
MONOCYTES # BLD AUTO: 0.4 10*3/MM3 (ref 0.1–0.6)
MONOCYTES NFR BLD AUTO: 4.3 % (ref 2–9)
NEUTROPHILS # BLD AUTO: 6.4 10*3/MM3 (ref 1.4–6.5)
NEUTROPHILS NFR BLD AUTO: 75.8 % (ref 42–75)
PLATELET # BLD AUTO: 214 10*3/MM3 (ref 150–450)
PMV BLD AUTO: 7.6 FL (ref 7.1–10.5)
RBC # BLD AUTO: 3.67 10*6/MM3 (ref 4–6)
RSV AG SPEC QL: NEGATIVE
WBC NRBC COR # BLD: 8.5 10*3/MM3 (ref 4.5–10)

## 2018-02-02 PROCEDURE — G0438 PPPS, INITIAL VISIT: HCPCS | Performed by: NURSE PRACTITIONER

## 2018-02-02 PROCEDURE — 85025 COMPLETE CBC W/AUTO DIFF WBC: CPT | Performed by: NURSE PRACTITIONER

## 2018-02-02 PROCEDURE — 36415 COLL VENOUS BLD VENIPUNCTURE: CPT | Performed by: NURSE PRACTITIONER

## 2018-02-02 PROCEDURE — 99213 OFFICE O/P EST LOW 20 MIN: CPT | Performed by: NURSE PRACTITIONER

## 2018-02-02 PROCEDURE — 87807 RSV ASSAY W/OPTIC: CPT | Performed by: NURSE PRACTITIONER

## 2018-02-02 NOTE — PROGRESS NOTES
Subjective   Paz Browne is a 62 y.o. female.     History of Present Illness   Here to FU on recent right middle lobe pna and RSV with COPD exacerbation, was seen here 01/23/18 for FU [facility] admission 01/02/18 by me with elevate WBC started doxycycline 100 BID x 10 days now finished, prev saw pulm and had extended steroids, here today overall feeling better, some cont SOA on daily O2 2L NC, wants to make sure WBC normal and no longer has RSV as wants to be around grandson, no fevers, no coughing, sore throat, sinus tenderness or congestion or ear pain, no NV abd pain or constipation diarrhea, still using albuterol prn, advair, spiriva, with WBC recheck last week normal, kidney function improved, with chronic low hemoglobin on iron BID s/p colonoscopy with complications, denies stool changes but some chronic fatigue  Last pap Age 25 for partial hyst and then 1 year later full hyst for endometriosis, last mammo abnl with nodule removed > 5 years ago    The following portions of the patient's history were reviewed and updated as appropriate: allergies, current medications, past family history, past medical history, past social history, past surgical history and problem list.    Review of Systems   Constitutional: Positive for fatigue. Negative for fever.   HENT: Negative for congestion, dental problem, drooling, ear discharge, ear pain, facial swelling, hearing loss, mouth sores, nosebleeds, postnasal drip, rhinorrhea, sinus pain, sinus pressure, sneezing, sore throat, tinnitus, trouble swallowing and voice change.    Respiratory: Positive for shortness of breath. Negative for cough, chest tightness and wheezing.    Cardiovascular: Negative for chest pain, palpitations and leg swelling.   Gastrointestinal: Negative for abdominal distention, abdominal pain, anal bleeding, blood in stool, constipation, diarrhea, nausea, rectal pain and vomiting.   Musculoskeletal: Negative for arthralgias.   Neurological:  Negative for dizziness and headaches.   All other systems reviewed and are negative.      Objective   Physical Exam   Constitutional: She is oriented to person, place, and time. She appears well-developed and well-nourished.   HENT:   Head: Normocephalic and atraumatic.   Eyes: Conjunctivae and EOM are normal. Pupils are equal, round, and reactive to light.   Cardiovascular: Normal rate, regular rhythm and normal heart sounds.    Pulmonary/Chest: Effort normal and breath sounds normal.   Musculoskeletal: Normal range of motion.   Neurological: She is alert and oriented to person, place, and time.   Skin: Skin is warm and dry.   Psychiatric: She has a normal mood and affect. Her behavior is normal. Judgment and thought content normal.   Vitals reviewed.      Assessment/Plan   Paz was seen today for follow-up.    Diagnoses and all orders for this visit:    Medicare annual wellness visit, initial    Pneumonia of right middle lobe due to infectious organism  -     CBC & Differential  -     CBC Auto Differential    RSV (acute bronchiolitis due to respiratory syncytial virus)  -     RSV Screen - Swab, Nasopharynx    Chronic obstructive pulmonary disease, unspecified COPD type    medicare wellness today, recheck CBC and RSV negative, cont all chronic dz meds and monitor, cont FU with pulm, enc overdue mammo pt will call and schedule and RTC overdue pap and pelvic

## 2018-02-02 NOTE — PROGRESS NOTES
QUICK REFERENCE INFORMATION:  The ABCs of the Annual Wellness Visit    Initial Medicare Wellness Visit    HEALTH RISK ASSESSMENT    1955    Recent Hospitalizations:  Recently treated at the following:  [facility] 01/18 pna and RSV    Current Medical Providers:  Patient Care Team:  Javan Martinez MD as PCP - General  Javan Martinez MD as PCP - Family Medicine  Ag Melo MD as Consulting Physician (Pulmonary Disease)  Cleveland Devine MD as Consulting Physician (Gastroenterology)  Earenst Gan MD as Consulting Physician (Cardiology)    Smoking Status:  History   Smoking Status   • Former Smoker   • Quit date: 2007   Smokeless Tobacco   • Never Used     Alcohol Consumption:  History   Alcohol Use No     Comment: caffiene use     Depression Screen:   PHQ-2/PHQ-9 Depression Screening 2/2/2018   Little interest or pleasure in doing things 0   Feeling down, depressed, or hopeless 0   Trouble falling or staying asleep, or sleeping too much 0   Feeling tired or having little energy 0   Poor appetite or overeating 0   Feeling bad about yourself - or that you are a failure or have let yourself or your family down 0   Trouble concentrating on things, such as reading the newspaper or watching television 0   Moving or speaking so slowly that other people could have noticed. Or the opposite - being so fidgety or restless that you have been moving around a lot more than usual 0   Thoughts that you would be better off dead, or of hurting yourself in some way 0   Total Score 0   If you checked off any problems, how difficult have these problems made it for you to do your work, take care of things at home, or get along with other people? Not difficult at all     Health Habits and Functional and Cognitive Screening:  Functional & Cognitive Status 2/2/2018   Do you have difficulty preparing food and eating? No   Do you have difficulty bathing yourself, getting dressed or grooming yourself? No   Do you  have difficulty using the toilet? No   Do you have difficulty moving around from place to place? No   Do you have trouble with steps or getting out of a bed or a chair? No   In the past year have you fallen or experienced a near fall? Yes   Current Diet Well Balanced Diet   Dental Exam Not up to date   Eye Exam Up to date   Exercise (times per week) 7 times per week   Current Exercise Activities Include Walking   Do you need help using the phone?  No   Are you deaf or do you have serious difficulty hearing?  No   Do you need help with transportation? No   Do you need help shopping? No   Do you need help preparing meals?  No   Do you need help with housework?  No   Do you need help with laundry? No   Do you need help taking your medications? No   Do you need help managing money? No   Have you felt unusual stress, anger or loneliness in the last month? No   Who do you live with? Spouse   If you need help, do you have trouble finding someone available to you? No   Have you been bothered in the last four weeks by sexual problems? No   Do you have difficulty concentrating, remembering or making decisions? No     Does the patient have evidence of cognitive impairment? No    Asiprin use counseling: Taking ASA appropriately as indicated      Recent Lab Results:reviewed labs 01/18, recheck today    Visual Acuity:  No exam data present    Age-appropriate Screening Schedule:  Refer to the list below for future screening recommendations based on patient's age, sex and/or medical conditions. Orders for these recommended tests are listed in the plan section. The patient has been provided with a written plan.    Health Maintenance   Topic Date Due   • MAMMOGRAM  02/12/2016   • PAP SMEAR  03/08/2016   • LIPID PANEL  08/10/2018   • COLONOSCOPY  12/22/2020   • TDAP/TD VACCINES (2 - Td) 11/18/2026   • INFLUENZA VACCINE  Completed   • ZOSTER VACCINE  Addressed        Subjective   History of Present Illness    Paz A Pavan is a 62  y.o. female who presents for an Annual Wellness Visit.    The following portions of the patient's history were reviewed and updated as appropriate: allergies, current medications, past family history, past medical history, past social history, past surgical history and problem list.    Outpatient Medications Prior to Visit   Medication Sig Dispense Refill   • albuterol (PROVENTIL HFA;VENTOLIN HFA) 108 (90 Base) MCG/ACT inhaler Inhale 2 puffs Every 4 (Four) Hours As Needed for Wheezing. 6.7 g 11   • albuterol (PROVENTIL) (2.5 MG/3ML) 0.083% nebulizer solution Take 2.5 mg by nebulization Every 4 (Four) Hours As Needed for Wheezing.  12   • aspirin 81 MG EC tablet Take 1 tablet by mouth Daily.     • atorvastatin (LIPITOR) 40 MG tablet Take 1 tablet by mouth Daily. 90 tablet 3   • bacitracin 500 UNIT/GM ointment Apply  topically 3 (Three) Times a Day. 1 tube 0   • carvedilol (COREG) 6.25 MG tablet Take 6.25 mg by mouth 2 (Two) Times a Day With Meals.     • cyclobenzaprine (FLEXERIL) 10 MG tablet Take 10 mg by mouth Every Night.     • ferrous sulfate 325 (65 FE) MG tablet Take 1 tablet by mouth 2 (Two) Times a Day With Meals. 180 tablet 1   • fluticasone-salmeterol (ADVAIR DISKUS) 250-50 MCG/DOSE DISKUS Inhale 1 puff 2 (Two) Times a Day. 3 each 3   • furosemide (LASIX) 20 MG tablet TAKE ONE TABLET BY MOUTH DAILY  1   • furosemide (LASIX) 40 MG tablet Take 1 tablet by mouth 2 (Two) Times a Day. (Patient taking differently: Take 40 mg by mouth 2 (Two) Times a Day. Patient varies between 20 mg daily all the way to 40mg BID based on weights) 180 tablet 1   • HYDROcodone-acetaminophen (NORCO) 5-325 MG per tablet Take 1 tablet by mouth Every 4 (Four) Hours As Needed for Moderate Pain . Chronic pain medicine for low back pain 90 tablet 0   • lisinopril (PRINIVIL,ZESTRIL) 5 MG tablet Take one Tablet by mouth Once a day (Patient taking differently: 2.5 mg Daily. Take one Tablet by mouth Once a day) 90 tablet 1   • loratadine  (CLARITIN) 10 MG tablet Take 10 mg by mouth 2 (Two) Times a Day.     • meclizine (ANTIVERT) 25 MG tablet Take 25 mg by mouth 3 (Three) Times a Day As Needed for dizziness.     • Multiple Vitamins-Minerals (MULTIVITAL) tablet Take 1 tablet by mouth Daily.     • omeprazole (priLOSEC) 40 MG capsule Take 1 capsule by mouth Daily. 90 capsule 1   • ondansetron (ZOFRAN) 4 MG tablet Take 4 mg by mouth Every 12 (Twelve) Hours As Needed for Nausea or Vomiting.     • polyethylene glycol (MIRALAX) packet Take 17 g by mouth 2 (Two) Times a Day.     • potassium chloride (K-DUR) 10 MEQ CR tablet Take 20 mEq by mouth 2 (Two) Times a Day. Pt states 10mEq BID     • predniSONE (DELTASONE) 20 MG tablet One bid for 3 days, then 1 daily for 3 days 9 tablet 0   • sertraline (ZOLOFT) 100 MG tablet Take 100 mg by mouth Daily.     • sucralfate (CARAFATE) 1 g tablet Take 1 g by mouth 4 (Four) Times a Day.     • temazepam (RESTORIL) 30 MG capsule Take 30 mg by mouth every night at bedtime.     • tiotropium (SPIRIVA HANDIHALER) 18 MCG per inhalation capsule Place 1 capsule into inhaler and inhale Daily. 90 capsule 3   • doxycycline (VIBRAMYICN) 100 MG tablet Take 1 tablet by mouth 2 (Two) Times a Day. 20 tablet 0     No facility-administered medications prior to visit.        Patient Active Problem List   Diagnosis   • PAF (paroxysmal atrial fibrillation)   • Hypertension   • Hyperlipidemia   • Chronic obstructive pulmonary disease with acute exacerbation   • Pain medication agreement   • Hiatal hernia   • Primary osteoarthritis involving multiple joints   • Pulmonary hypertension   • S/P TVR (tricuspid valve repair)   • Long term current use of anticoagulant   • Pulmonary nodule   • Hemoptysis   • RLS (restless legs syndrome)   • Chronic low back pain   • Dysplastic polyp of colon   • Lower GI bleed   • History of mitral valve replacement with tissue graft   • Chronic respiratory failure with hypoxia   • Anemia   • Celiac artery stenosis   •  "Acute on chronic diastolic congestive heart failure   • Intertrigo   • GI bleed   • Acute exacerbation of chronic obstructive pulmonary disease (COPD)       Advance Care Planning:  has an advance directive - a copy HAS NOT been provided enc to bring a copy to [facility]    Identification of Risk Factors:  Risk factors include: weight , cardiovascular risk and lung disease.    Review of Systems    Compared to one year ago, the patient feels her physical health is the same.  Compared to one year ago, the patient feels her mental health is the same.    Objective     Physical Exam    Vitals:    02/02/18 1013   BP: 124/70   BP Location: Left arm   Patient Position: Sitting   Cuff Size: Adult   Pulse: 83   Temp: 97.5 °F (36.4 °C)   TempSrc: Oral   SpO2: 97%   Weight: 75.9 kg (167 lb 6.4 oz)   Height: 170.2 cm (67\")   PainSc: 0-No pain       Body mass index is 26.22 kg/(m^2).  Discussed the patient's BMI with her. BMI is above normal parameters. Follow-up plan includes:  exercise counseling and nutrition counseling.    Assessment/Plan   Patient Self-Management and Personalized Health Advice  The patient has been provided with information about: diet, exercise, weight management and prevention of cardiac or vascular disease and preventive services including:   · Fall Risk assessment done, Screening mammography, referral placed pt will call to schedule overdue mammo, RTC overdue pap    Visit Diagnoses:    ICD-10-CM ICD-9-CM   1. Medicare annual wellness visit, initial Z00.00 V70.0   2. Pneumonia of right middle lobe due to infectious organism J18.1 486   3. RSV (acute bronchiolitis due to respiratory syncytial virus) J21.0 466.11   4. Chronic obstructive pulmonary disease, unspecified COPD type J44.9 496       Orders Placed This Encounter   Procedures   • RSV Screen - Swab, Nasopharynx   • CBC Auto Differential   • CBC & Differential     Order Specific Question:   Manual Differential     Answer:   No       Outpatient Encounter " Prescriptions as of 2/2/2018   Medication Sig Dispense Refill   • albuterol (PROVENTIL HFA;VENTOLIN HFA) 108 (90 Base) MCG/ACT inhaler Inhale 2 puffs Every 4 (Four) Hours As Needed for Wheezing. 6.7 g 11   • albuterol (PROVENTIL) (2.5 MG/3ML) 0.083% nebulizer solution Take 2.5 mg by nebulization Every 4 (Four) Hours As Needed for Wheezing.  12   • aspirin 81 MG EC tablet Take 1 tablet by mouth Daily.     • atorvastatin (LIPITOR) 40 MG tablet Take 1 tablet by mouth Daily. 90 tablet 3   • bacitracin 500 UNIT/GM ointment Apply  topically 3 (Three) Times a Day. 1 tube 0   • carvedilol (COREG) 6.25 MG tablet Take 6.25 mg by mouth 2 (Two) Times a Day With Meals.     • cyclobenzaprine (FLEXERIL) 10 MG tablet Take 10 mg by mouth Every Night.     • ferrous sulfate 325 (65 FE) MG tablet Take 1 tablet by mouth 2 (Two) Times a Day With Meals. 180 tablet 1   • fluticasone-salmeterol (ADVAIR DISKUS) 250-50 MCG/DOSE DISKUS Inhale 1 puff 2 (Two) Times a Day. 3 each 3   • furosemide (LASIX) 20 MG tablet TAKE ONE TABLET BY MOUTH DAILY  1   • furosemide (LASIX) 40 MG tablet Take 1 tablet by mouth 2 (Two) Times a Day. (Patient taking differently: Take 40 mg by mouth 2 (Two) Times a Day. Patient varies between 20 mg daily all the way to 40mg BID based on weights) 180 tablet 1   • HYDROcodone-acetaminophen (NORCO) 5-325 MG per tablet Take 1 tablet by mouth Every 4 (Four) Hours As Needed for Moderate Pain . Chronic pain medicine for low back pain 90 tablet 0   • lisinopril (PRINIVIL,ZESTRIL) 5 MG tablet Take one Tablet by mouth Once a day (Patient taking differently: 2.5 mg Daily. Take one Tablet by mouth Once a day) 90 tablet 1   • loratadine (CLARITIN) 10 MG tablet Take 10 mg by mouth 2 (Two) Times a Day.     • meclizine (ANTIVERT) 25 MG tablet Take 25 mg by mouth 3 (Three) Times a Day As Needed for dizziness.     • Multiple Vitamins-Minerals (MULTIVITAL) tablet Take 1 tablet by mouth Daily.     • omeprazole (priLOSEC) 40 MG capsule  Take 1 capsule by mouth Daily. 90 capsule 1   • ondansetron (ZOFRAN) 4 MG tablet Take 4 mg by mouth Every 12 (Twelve) Hours As Needed for Nausea or Vomiting.     • polyethylene glycol (MIRALAX) packet Take 17 g by mouth 2 (Two) Times a Day.     • potassium chloride (K-DUR) 10 MEQ CR tablet Take 20 mEq by mouth 2 (Two) Times a Day. Pt states 10mEq BID     • predniSONE (DELTASONE) 20 MG tablet One bid for 3 days, then 1 daily for 3 days 9 tablet 0   • sertraline (ZOLOFT) 100 MG tablet Take 100 mg by mouth Daily.     • sucralfate (CARAFATE) 1 g tablet Take 1 g by mouth 4 (Four) Times a Day.     • temazepam (RESTORIL) 30 MG capsule Take 30 mg by mouth every night at bedtime.     • tiotropium (SPIRIVA HANDIHALER) 18 MCG per inhalation capsule Place 1 capsule into inhaler and inhale Daily. 90 capsule 3   • doxycycline (VIBRAMYICN) 100 MG tablet Take 1 tablet by mouth 2 (Two) Times a Day. 20 tablet 0     No facility-administered encounter medications on file as of 2/2/2018.        Reviewed use of high risk medication in the elderly: yes  Reviewed for potential of harmful drug interactions in the elderly: yes    Follow Up:  No Follow-up on file.     An After Visit Summary and PPPS with all of these plans were given to the patient.

## 2018-02-02 NOTE — PATIENT INSTRUCTIONS
medicare wellness today, recheck CBC and RSV negative, cont all chronic dz meds and monitor, cont FU with pulm, enc overdue mammo pt will call and schedule and RTC overdue pap and pelvic

## 2018-02-05 ENCOUNTER — TELEPHONE (OUTPATIENT)
Dept: FAMILY MEDICINE CLINIC | Facility: CLINIC | Age: 63
End: 2018-02-05

## 2018-02-05 RX ORDER — HYDROCODONE BITARTRATE AND ACETAMINOPHEN 5; 325 MG/1; MG/1
1 TABLET ORAL EVERY 4 HOURS PRN
Qty: 90 TABLET | Refills: 0 | Status: SHIPPED | OUTPATIENT
Start: 2018-02-05 | End: 2018-03-27 | Stop reason: SDUPTHER

## 2018-02-06 ENCOUNTER — TELEPHONE (OUTPATIENT)
Dept: FAMILY MEDICINE CLINIC | Facility: CLINIC | Age: 63
End: 2018-02-06

## 2018-02-19 RX ORDER — OMEPRAZOLE 40 MG/1
CAPSULE, DELAYED RELEASE ORAL
Qty: 90 CAPSULE | Refills: 1 | Status: SHIPPED | OUTPATIENT
Start: 2018-02-19 | End: 2018-10-27 | Stop reason: SDUPTHER

## 2018-02-20 DIAGNOSIS — R93.3 ABNORMAL FINDINGS ON DIAGNOSTIC IMAGING OF OTHER PARTS OF DIGESTIVE TRACT: ICD-10-CM

## 2018-02-20 DIAGNOSIS — E55.9 VITAMIN D DEFICIENCY: ICD-10-CM

## 2018-02-20 DIAGNOSIS — D50.8 OTHER IRON DEFICIENCY ANEMIA: Primary | ICD-10-CM

## 2018-02-21 ENCOUNTER — LAB (OUTPATIENT)
Dept: FAMILY MEDICINE CLINIC | Facility: CLINIC | Age: 63
End: 2018-02-21

## 2018-02-21 DIAGNOSIS — R93.3 ABNORMAL FINDINGS ON DIAGNOSTIC IMAGING OF OTHER PARTS OF DIGESTIVE TRACT: ICD-10-CM

## 2018-02-21 DIAGNOSIS — D50.8 OTHER IRON DEFICIENCY ANEMIA: ICD-10-CM

## 2018-02-21 DIAGNOSIS — E55.9 VITAMIN D DEFICIENCY: ICD-10-CM

## 2018-02-21 LAB
25(OH)D3 SERPL-MCNC: 62.4 NG/ML (ref 30–100)
ALBUMIN SERPL-MCNC: 4.2 G/DL (ref 3.5–5.2)
ALBUMIN/GLOB SERPL: 1.6 G/DL
ALP SERPL-CCNC: 77 U/L (ref 39–117)
ALT SERPL W P-5'-P-CCNC: 17 U/L (ref 1–33)
ANION GAP SERPL CALCULATED.3IONS-SCNC: 11.4 MMOL/L
AST SERPL-CCNC: 19 U/L (ref 1–32)
BILIRUB SERPL-MCNC: 0.5 MG/DL (ref 0.1–1.2)
BUN BLD-MCNC: 11 MG/DL (ref 8–23)
BUN/CREAT SERPL: 9.6 (ref 7–25)
CALCIUM SPEC-SCNC: 9.7 MG/DL (ref 8.6–10.5)
CHLORIDE SERPL-SCNC: 100 MMOL/L (ref 98–107)
CHOLEST SERPL-MCNC: 177 MG/DL (ref 0–200)
CO2 SERPL-SCNC: 30.6 MMOL/L (ref 22–29)
CREAT BLD-MCNC: 1.14 MG/DL (ref 0.57–1)
ERYTHROCYTE [DISTWIDTH] IN BLOOD BY AUTOMATED COUNT: 15.2 % (ref 4.5–15)
FERRITIN SERPL-MCNC: 145.6 NG/ML (ref 13–150)
GFR SERPL CREATININE-BSD FRML MDRD: 48 ML/MIN/1.73
GLOBULIN UR ELPH-MCNC: 2.7 GM/DL
GLUCOSE BLD-MCNC: 86 MG/DL (ref 65–99)
HCT VFR BLD AUTO: 31.5 % (ref 31–42)
HDLC SERPL-MCNC: 58 MG/DL (ref 40–60)
HGB BLD-MCNC: 10.2 G/DL (ref 12–18)
LDLC SERPL CALC-MCNC: 75 MG/DL (ref 0–100)
LDLC/HDLC SERPL: 1.29 {RATIO}
LYMPHOCYTES # BLD AUTO: 1.5 10*3/MM3 (ref 1.2–3.4)
LYMPHOCYTES NFR BLD AUTO: 17.6 % (ref 21–51)
MCH RBC QN AUTO: 29 PG (ref 26.1–33.1)
MCHC RBC AUTO-ENTMCNC: 32.4 G/DL (ref 33–37)
MCV RBC AUTO: 89.5 FL (ref 80–99)
MONOCYTES # BLD AUTO: 0.4 10*3/MM3 (ref 0.1–0.6)
MONOCYTES NFR BLD AUTO: 4.7 % (ref 2–9)
NEUTROPHILS # BLD AUTO: 6.7 10*3/MM3 (ref 1.4–6.5)
NEUTROPHILS NFR BLD AUTO: 77.7 % (ref 42–75)
PLATELET # BLD AUTO: 237 10*3/MM3 (ref 150–450)
PMV BLD AUTO: 7.3 FL (ref 7.1–10.5)
POTASSIUM BLD-SCNC: 3.8 MMOL/L (ref 3.5–5.2)
PROT SERPL-MCNC: 6.9 G/DL (ref 6–8.5)
RBC # BLD AUTO: 3.52 10*6/MM3 (ref 4–6)
SODIUM BLD-SCNC: 142 MMOL/L (ref 136–145)
TRIGL SERPL-MCNC: 221 MG/DL (ref 0–150)
VLDLC SERPL-MCNC: 44.2 MG/DL (ref 5–40)
WBC NRBC COR # BLD: 8.6 10*3/MM3 (ref 4.5–10)

## 2018-02-21 PROCEDURE — 36415 COLL VENOUS BLD VENIPUNCTURE: CPT | Performed by: INTERNAL MEDICINE

## 2018-02-21 PROCEDURE — 80061 LIPID PANEL: CPT | Performed by: INTERNAL MEDICINE

## 2018-02-21 PROCEDURE — 85025 COMPLETE CBC W/AUTO DIFF WBC: CPT | Performed by: INTERNAL MEDICINE

## 2018-02-21 PROCEDURE — 82728 ASSAY OF FERRITIN: CPT | Performed by: INTERNAL MEDICINE

## 2018-02-21 PROCEDURE — 82306 VITAMIN D 25 HYDROXY: CPT | Performed by: INTERNAL MEDICINE

## 2018-02-21 PROCEDURE — 80053 COMPREHEN METABOLIC PANEL: CPT | Performed by: INTERNAL MEDICINE

## 2018-02-23 ENCOUNTER — TELEPHONE (OUTPATIENT)
Dept: FAMILY MEDICINE CLINIC | Facility: CLINIC | Age: 63
End: 2018-02-23

## 2018-03-12 RX ORDER — FUROSEMIDE 20 MG/1
TABLET ORAL
Qty: 90 TABLET | Refills: 1 | Status: SHIPPED | OUTPATIENT
Start: 2018-03-12 | End: 2018-03-25

## 2018-03-25 ENCOUNTER — HOSPITAL ENCOUNTER (EMERGENCY)
Facility: HOSPITAL | Age: 63
Discharge: HOME | End: 2018-03-25
Attending: EMERGENCY MEDICINE | Admitting: EMERGENCY MEDICINE

## 2018-03-25 ENCOUNTER — APPOINTMENT (OUTPATIENT)
Dept: GENERAL RADIOLOGY | Facility: HOSPITAL | Age: 63
End: 2018-03-25

## 2018-03-25 VITALS
TEMPERATURE: 99 F | HEIGHT: 67 IN | WEIGHT: 157 LBS | DIASTOLIC BLOOD PRESSURE: 63 MMHG | BODY MASS INDEX: 24.64 KG/M2 | RESPIRATION RATE: 18 BRPM | SYSTOLIC BLOOD PRESSURE: 135 MMHG | HEART RATE: 67 BPM | OXYGEN SATURATION: 99 %

## 2018-03-25 DIAGNOSIS — J18.9 PNEUMONIA OF LEFT LOWER LOBE DUE TO INFECTIOUS ORGANISM: ICD-10-CM

## 2018-03-25 DIAGNOSIS — J44.1 ACUTE EXACERBATION OF CHRONIC OBSTRUCTIVE PULMONARY DISEASE (COPD) (HCC): ICD-10-CM

## 2018-03-25 DIAGNOSIS — R06.02 SHORTNESS OF BREATH: Primary | ICD-10-CM

## 2018-03-25 LAB
ALBUMIN SERPL-MCNC: 4.4 G/DL (ref 3.5–5.2)
ALBUMIN/GLOB SERPL: 1.6 G/DL
ALP SERPL-CCNC: 83 U/L (ref 39–117)
ALT SERPL W P-5'-P-CCNC: 15 U/L (ref 1–33)
ANION GAP SERPL CALCULATED.3IONS-SCNC: 13.3 MMOL/L
AST SERPL-CCNC: 19 U/L (ref 1–32)
BASOPHILS # BLD AUTO: 0.02 10*3/MM3 (ref 0–0.2)
BASOPHILS NFR BLD AUTO: 0.4 % (ref 0–1.5)
BILIRUB SERPL-MCNC: 0.5 MG/DL (ref 0.1–1.2)
BUN BLD-MCNC: 9 MG/DL (ref 8–23)
BUN/CREAT SERPL: 10.2 (ref 7–25)
CALCIUM SPEC-SCNC: 9.6 MG/DL (ref 8.6–10.5)
CHLORIDE SERPL-SCNC: 99 MMOL/L (ref 98–107)
CO2 SERPL-SCNC: 32.7 MMOL/L (ref 22–29)
CREAT BLD-MCNC: 0.88 MG/DL (ref 0.57–1)
DEPRECATED RDW RBC AUTO: 43.9 FL (ref 37–54)
EOSINOPHIL # BLD AUTO: 0.15 10*3/MM3 (ref 0–0.7)
EOSINOPHIL NFR BLD AUTO: 2.7 % (ref 0.3–6.2)
ERYTHROCYTE [DISTWIDTH] IN BLOOD BY AUTOMATED COUNT: 13.2 % (ref 11.7–13)
GFR SERPL CREATININE-BSD FRML MDRD: 65 ML/MIN/1.73
GLOBULIN UR ELPH-MCNC: 2.8 GM/DL
GLUCOSE BLD-MCNC: 124 MG/DL (ref 65–99)
HCT VFR BLD AUTO: 33.4 % (ref 35.6–45.5)
HGB BLD-MCNC: 10.5 G/DL (ref 11.9–15.5)
IMM GRANULOCYTES # BLD: 0.02 10*3/MM3 (ref 0–0.03)
IMM GRANULOCYTES NFR BLD: 0.4 % (ref 0–0.5)
LYMPHOCYTES # BLD AUTO: 1.03 10*3/MM3 (ref 0.9–4.8)
LYMPHOCYTES NFR BLD AUTO: 18.2 % (ref 19.6–45.3)
MCH RBC QN AUTO: 28.5 PG (ref 26.9–32)
MCHC RBC AUTO-ENTMCNC: 31.4 G/DL (ref 32.4–36.3)
MCV RBC AUTO: 90.5 FL (ref 80.5–98.2)
MONOCYTES # BLD AUTO: 0.19 10*3/MM3 (ref 0.2–1.2)
MONOCYTES NFR BLD AUTO: 3.4 % (ref 5–12)
NEUTROPHILS # BLD AUTO: 4.24 10*3/MM3 (ref 1.9–8.1)
NEUTROPHILS NFR BLD AUTO: 74.9 % (ref 42.7–76)
NT-PROBNP SERPL-MCNC: 850.3 PG/ML (ref 0–900)
PLATELET # BLD AUTO: 230 10*3/MM3 (ref 140–500)
PMV BLD AUTO: 10.5 FL (ref 6–12)
POTASSIUM BLD-SCNC: 2.9 MMOL/L (ref 3.5–5.2)
PROT SERPL-MCNC: 7.2 G/DL (ref 6–8.5)
RBC # BLD AUTO: 3.69 10*6/MM3 (ref 3.9–5.2)
SODIUM BLD-SCNC: 145 MMOL/L (ref 136–145)
TROPONIN T SERPL-MCNC: <0.01 NG/ML (ref 0–0.03)
WBC NRBC COR # BLD: 5.65 10*3/MM3 (ref 4.5–10.7)

## 2018-03-25 PROCEDURE — 84484 ASSAY OF TROPONIN QUANT: CPT | Performed by: EMERGENCY MEDICINE

## 2018-03-25 PROCEDURE — 83880 ASSAY OF NATRIURETIC PEPTIDE: CPT | Performed by: EMERGENCY MEDICINE

## 2018-03-25 PROCEDURE — 93005 ELECTROCARDIOGRAM TRACING: CPT | Performed by: EMERGENCY MEDICINE

## 2018-03-25 PROCEDURE — 63710000001 PREDNISONE PER 1 MG: Performed by: EMERGENCY MEDICINE

## 2018-03-25 PROCEDURE — 71046 X-RAY EXAM CHEST 2 VIEWS: CPT

## 2018-03-25 PROCEDURE — 85025 COMPLETE CBC W/AUTO DIFF WBC: CPT | Performed by: EMERGENCY MEDICINE

## 2018-03-25 PROCEDURE — 80053 COMPREHEN METABOLIC PANEL: CPT | Performed by: EMERGENCY MEDICINE

## 2018-03-25 PROCEDURE — 93010 ELECTROCARDIOGRAM REPORT: CPT | Performed by: INTERNAL MEDICINE

## 2018-03-25 PROCEDURE — 99284 EMERGENCY DEPT VISIT MOD MDM: CPT

## 2018-03-25 RX ORDER — PREDNISONE 10 MG/1
TABLET ORAL
Qty: 40 TABLET | Refills: 0 | Status: SHIPPED | OUTPATIENT
Start: 2018-03-25 | End: 2018-06-13 | Stop reason: SDUPTHER

## 2018-03-25 RX ORDER — ROFLUMILAST 500 UG/1
TABLET ORAL DAILY
COMMUNITY
End: 2020-06-18 | Stop reason: SDUPTHER

## 2018-03-25 RX ORDER — SODIUM CHLORIDE 0.9 % (FLUSH) 0.9 %
10 SYRINGE (ML) INJECTION AS NEEDED
Status: DISCONTINUED | OUTPATIENT
Start: 2018-03-25 | End: 2018-03-25 | Stop reason: HOSPADM

## 2018-03-25 RX ORDER — DOXYCYCLINE 100 MG/1
100 CAPSULE ORAL 2 TIMES DAILY
Qty: 20 CAPSULE | Refills: 0 | Status: SHIPPED | OUTPATIENT
Start: 2018-03-25 | End: 2018-06-13 | Stop reason: SDUPTHER

## 2018-03-25 RX ORDER — ACETAMINOPHEN 500 MG
1000 TABLET ORAL ONCE
Status: COMPLETED | OUTPATIENT
Start: 2018-03-25 | End: 2018-03-25

## 2018-03-25 RX ORDER — PREDNISONE 20 MG/1
60 TABLET ORAL ONCE
Status: COMPLETED | OUTPATIENT
Start: 2018-03-25 | End: 2018-03-25

## 2018-03-25 RX ADMIN — ACETAMINOPHEN 1000 MG: 500 TABLET ORAL at 10:49

## 2018-03-25 RX ADMIN — PREDNISONE 60 MG: 20 TABLET ORAL at 10:44

## 2018-03-25 NOTE — ED PROVIDER NOTES
" EMERGENCY DEPARTMENT ENCOUNTER    CHIEF COMPLAINT  Chief Complaint: Dyspnea  History given by: Pt  History limited by: Nothing  Room Number: 35/35  PMD: Javan Martinez MD  Pulmonologist: Dr. Melo  Cardiologist: Dr. Gan    HPI:  Pt is a 62 y.o. female with a hx of A-fib, COPD, CHF, and hypertension who presents complaining of dyspnea with and without exertion onset about three days ago. Pt also complains of wheezing and cough but denies fever, or any other symptoms at this time. She reports hx of pneumonia and states her current symptoms feel similar to her previous pneumonia episodes.Wants to \"catch it\" before it gets bad.   She states she used her rescue inhaler and her nebulizer for symptom relief. She states she is normally on 3L of O2 at home.     Duration:  About three days  Onset: gradual  Timing: constant  Location: n/a  Radiation: none  Quality: \"with and without exertion\"  Intensity/Severity: moderate  Progression: unchanged  Associated Symptoms: SOA, wheezing and cough  Aggravating Factors: none  Alleviating Factors: none  Previous Episodes: none  Treatment before arrival: Pt received no treatment PTA.    PAST MEDICAL HISTORY  Active Ambulatory Problems     Diagnosis Date Noted   • PAF (paroxysmal atrial fibrillation) 01/25/2016   • Hypertension    • Hyperlipidemia    • Chronic obstructive pulmonary disease with acute exacerbation    • Pain medication agreement 05/04/2016   • Hiatal hernia 05/04/2016   • Primary osteoarthritis involving multiple joints 05/04/2016   • Pulmonary hypertension    • S/P TVR (tricuspid valve repair) 07/07/2016   • Long term current use of anticoagulant 10/13/2016   • Pulmonary nodule 10/13/2016   • Hemoptysis 10/14/2016   • RLS (restless legs syndrome) 11/18/2016   • Chronic low back pain 08/02/2017   • Dysplastic polyp of colon 08/07/2017   • Lower GI bleed 08/09/2017   • History of mitral valve replacement with tissue graft 08/11/2017   • Chronic respiratory failure " with hypoxia 08/13/2017   • Anemia 08/09/2017   • Celiac artery stenosis 08/24/2017   • Acute on chronic diastolic congestive heart failure 08/25/2017   • Intertrigo 08/25/2017   • GI bleed 12/01/2017   • Acute exacerbation of chronic obstructive pulmonary disease (COPD) 01/02/2018     Resolved Ambulatory Problems     Diagnosis Date Noted   • Tachycardia    • Renal failure    • Palpitations    • Mitral regurgitation    • Heart failure 06/08/2016   • Aspiration pneumonia 10/14/2016   • Precordial pain 08/11/2017   • Oral candidiasis 08/14/2017   • VAN (acute kidney injury) 08/15/2017     Past Medical History:   Diagnosis Date   • Anemia    • Atrial fibrillation    • Atrial flutter    • Cataract    • Celiac artery stenosis    • CHF (congestive heart failure)    • Chronic respiratory failure with hypoxia    • Colon polyp    • COPD (chronic obstructive pulmonary disease)    • Fall    • GI bleed    • History of mitral valve replacement with tissue graft    • Hyperlipidemia    • Hypertension    • Intertrigo    • Long term (current) use of anticoagulants    • Mitral regurgitation    • Palpitations    • Pneumonia    • Pulmonary hypertension    • Renal failure    • Tachycardia    • Valvular heart disease        PAST SURGICAL HISTORY  Past Surgical History:   Procedure Laterality Date   • CARDIAC CATHETERIZATION  09/01/2014    Right dominant systemt, normal coronary arteries.    • CARDIAC CATHETERIZATION Left 6/10/2016    Procedure: Cardiac catheterization;  Surgeon: Sergei Hall MD;  Location: [facility] CATH INVASIVE LOCATION;  Service:    • CARDIAC CATHETERIZATION N/A 6/10/2016    Procedure: Right Heart Cath;  Surgeon: Sergei Hall MD;  Location: [facility] CATH INVASIVE LOCATION;  Service:    • CATARACT EXTRACTION     • COLONOSCOPY     • COLONOSCOPY N/A 8/4/2017    Procedure: COLONOSCOPY TO CECUM/TI WITH POLYPECTOMY ( COLD BX);  Surgeon: Cleveland Devine MD;  Location: [facility] ENDOSCOPY;  Service:    • COLONOSCOPY N/A 8/10/2017     Procedure: COLONOSCOPY to cecum and TI with 2 clips placed at transverse;  Surgeon: Earnest PALOMO MD;  Location: [facility] ENDOSCOPY;  Service:    • COLONOSCOPY N/A 12/22/2017    Procedure: COLONOSCOPY INTO CECUM WITH COLD POLYPECTOMIES;  Surgeon: Cleveland Devine MD;  [facility] ENDOSCOPY;  Service:    • ENDOSCOPY N/A 8/17/2017    Procedure: ESOPHAGOGASTRODUODENOSCOPY;  Surgeon: Porsha Ruby MD;  Location: [facility] ENDOSCOPY;  Service:    • ENDOSCOPY N/A 12/22/2017    Procedure: ESOPHAGOGASTRODUODENOSCOPY WITH BIOPSIES;  Surgeon: Cleveland Devine MD;  Location: [facility] ENDOSCOPY;  Service:    • GALLBLADDER SURGERY     • HEMORRHOIDECTOMY     • HYSTERECTOMY     • KIDNEY SURGERY  04/22/2013   • MAZE PROCEDURE     • MITRAL VALVE REPLACEMENT     • TONSILLECTOMY     • TRICUSPID VALVE REPLACEMENT         FAMILY HISTORY  Family History   Problem Relation Age of Onset   • Adopted: Yes   • No Known Problems Mother    • No Known Problems Father        SOCIAL HISTORY  Social History     Social History   • Marital status:      Spouse name: N/A   • Number of children: N/A   • Years of education: N/A     Occupational History   • Not on file.     Social History Main Topics   • Smoking status: Former Smoker     Quit date: 2007   • Smokeless tobacco: Never Used   • Alcohol use No      Comment: caffiene use   • Drug use: No   • Sexual activity: Defer     Other Topics Concern   • Not on file     Social History Narrative   • No narrative on file       ALLERGIES  Bupropion; Metoprolol; and Cephalexin    REVIEW OF SYSTEMS  Review of Systems   Constitutional: Negative for fever.   HENT: Negative for sore throat.    Eyes: Negative.    Respiratory: Positive for cough, shortness of breath (and dyspnea) and wheezing.    Cardiovascular: Negative for chest pain.   Gastrointestinal: Negative for abdominal pain, diarrhea and vomiting.   Genitourinary: Negative for dysuria.   Musculoskeletal: Negative for neck pain.   Skin: Negative for  rash.   Allergic/Immunologic: Negative.    Neurological: Negative for weakness, numbness and headaches.   Hematological: Negative.    Psychiatric/Behavioral: Negative.    All other systems reviewed and are negative.      PHYSICAL EXAM  ED Triage Vitals [03/25/18 1009]   Temp Heart Rate Resp BP SpO2   99 °F (37.2 °C) 96 18 -- 90 %      Temp src Heart Rate Source Patient Position BP Location FiO2 (%)   Tympanic -- -- -- --       Physical Exam   Constitutional: She is oriented to person, place, and time and well-developed, well-nourished, and in no distress. No distress.   On 3L O2 by NC.     HENT:   Head: Normocephalic and atraumatic.   Mouth/Throat: Oropharynx is clear and moist.   Eyes: Conjunctivae and EOM are normal. Pupils are equal, round, and reactive to light.   Neck: Normal range of motion. Neck supple.   Cardiovascular: Normal rate and normal heart sounds.  An irregular rhythm present.   Pulmonary/Chest: Effort normal. No respiratory distress. She has wheezes (mild). She has rales (L base).   Abdominal: Soft. Bowel sounds are normal. There is no tenderness. There is no rebound and no guarding.   Musculoskeletal: Normal range of motion. She exhibits no edema (no lower extremity edema) or tenderness (no calf tendeerness).   Neurological: She is alert and oriented to person, place, and time. She has normal sensation and normal strength.   Skin: Skin is warm and dry. No rash noted.   Psychiatric: Mood and affect normal.   Nursing note and vitals reviewed.      LAB RESULTS  Lab Results (last 24 hours)     Procedure Component Value Units Date/Time    CBC & Differential [[id number]] Collected:  03/25/18 1036    Specimen:  Blood Updated:  03/25/18 1047    Narrative:       The following orders were created for panel order CBC & Differential.  Procedure                               Abnormality         Status                     ---------                               -----------         ------                     CBC  Auto Differential[[id number]]        Abnormal            Final result                 Please view results for these tests on the individual orders.    Comprehensive Metabolic Panel [[id number]]  (Abnormal) Collected:  03/25/18 1036    Specimen:  Blood Updated:  03/25/18 1109     Glucose 124 (H) mg/dL      BUN 9 mg/dL      Creatinine 0.88 mg/dL      Sodium 145 mmol/L      Potassium 2.9 (L) mmol/L      Chloride 99 mmol/L      CO2 32.7 (H) mmol/L      Calcium 9.6 mg/dL      Total Protein 7.2 g/dL      Albumin 4.40 g/dL      ALT (SGPT) 15 U/L      AST (SGOT) 19 U/L      Alkaline Phosphatase 83 U/L      Total Bilirubin 0.5 mg/dL      eGFR Non African Amer 65 mL/min/1.73      Globulin 2.8 gm/dL      A/G Ratio 1.6 g/dL      BUN/Creatinine Ratio 10.2     Anion Gap 13.3 mmol/L     Troponin [[id number]]  (Normal) Collected:  03/25/18 1036    Specimen:  Blood Updated:  03/25/18 1109     Troponin T <0.010 ng/mL     Narrative:       Troponin T Reference Ranges:  Less than 0.03 ng/mL:    Negative for AMI  0.03 to 0.09 ng/mL:      Indeterminant for AMI  Greater than 0.09 ng/mL: Positive for AMI    BNP [[id number]]  (Normal) Collected:  03/25/18 1036    Specimen:  Blood Updated:  03/25/18 1108     proBNP 850.3 pg/mL     Narrative:       Among patients with dyspnea, NT-proBNP is highly sensitive for the detection of acute congestive heart failure. In addition NT-proBNP of <300 pg/ml effectively rules out acute congestive heart failure with 99% negative predictive value.    CBC Auto Differential [[id number]]  (Abnormal) Collected:  03/25/18 1036    Specimen:  Blood Updated:  03/25/18 1047     WBC 5.65 10*3/mm3      RBC 3.69 (L) 10*6/mm3      Hemoglobin 10.5 (L) g/dL      Hematocrit 33.4 (L) %      MCV 90.5 fL      MCH 28.5 pg      MCHC 31.4 (L) g/dL      RDW 13.2 (H) %      RDW-SD 43.9 fl      MPV 10.5 fL      Platelets 230 10*3/mm3      Neutrophil % 74.9 %      Lymphocyte % 18.2 (L) %      Monocyte % 3.4 (L) %      Eosinophil % 2.7 %       Basophil % 0.4 %      Immature Grans % 0.4 %      Neutrophils, Absolute 4.24 10*3/mm3      Lymphocytes, Absolute 1.03 10*3/mm3      Monocytes, Absolute 0.19 (L) 10*3/mm3      Eosinophils, Absolute 0.15 10*3/mm3      Basophils, Absolute 0.02 10*3/mm3      Immature Grans, Absolute 0.02 10*3/mm3           I ordered the above labs and reviewed the results    RADIOLOGY  XR Chest 2 View   Final Result   1. Stable negative acute chest.   2. CT chest follow-up with high-resolution protocol with be helpful for   more accurate assessment of interstitial disease.       This report was finalized on 3/25/2018 11:33 AM by Dr. Valdez Infante MD.               I ordered the above noted radiological studies. Interpreted by radiologist. Reviewed by me in PACS.       PROCEDURES  Procedures    EKG           EKG time: 1050  Rhythm/Rate: NSR, 73  P waves and CT: nml  QRS, axis: nml   ST and T waves: nml     Interpreted Contemporaneously by me, independently viewed  Unchanged compared to prior 1/2/2018    PROGRESS AND CONSULTS  ED Course     1034 Ordered BNP, CBC, CMP, Troponin, and CXR for further evaluation. Ordered prednisone for treatment of pt's symptoms.  Offered Neb treatment, but patient declined at this time.     1046 Ordered 1g Tylenol for treatment of pt's headache    1148 BP- 150/61 HR- 71 Temp- 99 °F (37.2 °C) (Tympanic) O2 sat- 99%. Rechecked the patient who is in NAD and is resting comfortably. Informed pt of unremarkable lab results which shows negative BNP, Troponin, and WBC. Discussed with pt that she has pneumonia based on my clinical evaluation with crackles in the left lung base.. I will treat her as such. Discussed with pt plan to discharge and suggested PCP follow up. Pt understands and agrees with treatment. All questions and concerns were addressed at this time.      MEDICAL DECISION MAKING  Results were reviewed/discussed with the patient and they were also made aware of online access. Pt also made aware that  some labs, such as cultures, will not be resulted during ER visit and follow up with PMD is necessary.     MDM  Number of Diagnoses or Management Options  Acute exacerbation of chronic obstructive pulmonary disease (COPD):   Pneumonia of left lower lobe due to infectious organism:   Shortness of breath:      Amount and/or Complexity of Data Reviewed  Clinical lab tests: ordered and reviewed (WBC - 5.65, BNP - 850.3, Troponin - <.010)  Tests in the radiology section of CPT®: ordered and reviewed (CXR shows NAD.)  Tests in the medicine section of CPT®: ordered and reviewed (See EKG procedure note.)  Review and summarize past medical records: (Pt was admitted to the hospital Jan 2 - Jan 7 for acute pneumonia and hypoxemic respiratory failure.)    Patient Progress  Patient progress: stable         DIAGNOSIS  Final diagnoses:   Shortness of breath   Pneumonia of left lower lobe due to infectious organism   Acute exacerbation of chronic obstructive pulmonary disease (COPD)       DISPOSITION  DISCHARGE    Patient discharged in stable condition.    Reviewed implications of results, diagnosis, meds, responsibility to follow up, warning signs and symptoms of possible worsening, potential complications and reasons to return to ER, including new or worsening symptoms.    Patient/Family voiced understanding of above instructions.    Discussed plan for discharge, as there is no emergent indication for admission. Patient referred to primary care provider for BP management due to today's BP. Pt/family is agreeable and understands need for follow up and repeat testing.  Pt is aware that discharge does not mean that nothing is wrong but it indicates no emergency is present that requires admission and they must continue care with follow-up as given below or physician of their choice.     FOLLOW-UP  Javan Martinez MD  [address] [place], [place] [id number] [phone]    Schedule an appointment as soon as possible for a visit  For  primary physician follow-up         Medication List      New Prescriptions    doxycycline 100 MG capsule  Commonly known as:  MONODOX  Take 1 capsule by mouth 2 (Two) Times a Day.     predniSONE 10 MG tablet  Commonly known as:  DELTASONE  4 po QD x 3d, 3 po QD x 3d, 2 po QD x 3d, 1 po QD x 3d        Changed    * furosemide 40 MG tablet  Commonly known as:  LASIX  Take 1 tablet by mouth 2 (Two) Times a Day.  What changed:  additional instructions  Another medication with the same name was removed. Continue taking this   medication, and follow the directions you see here.     * furosemide 20 MG tablet  Commonly known as:  LASIX  What changed:  Another medication with the same name was changed. Make sure you   understand how and when to take each.  Another medication with the same name was removed. Continue taking this   medication, and follow the directions you see here.     lisinopril 5 MG tablet  Commonly known as:  PRINIVIL,ZESTRIL  Take one Tablet by mouth Once a day  What changed:  how much to take  when to take this  additional instructions        * This list has 2 medication(s) that are the same as other medications   prescribed for you. Read the directions carefully, and ask your doctor or   other care provider to review them with you.                Latest Documented Vital Signs:  As of 7:45 AM  BP- 135/63 HR- 67 Temp- 99 °F (37.2 °C) (Tympanic) O2 sat- 99%    --  Documentation assistance provided by thanh Lujan for Dr. Sandoval.  Information recorded by the scribe was done at my direction and has been verified and validated by me.     Jason Lujan  03/25/18 9722       Aly Sandoval MD  03/26/18 1554

## 2018-03-26 ENCOUNTER — TELEPHONE (OUTPATIENT)
Dept: CARDIOLOGY | Facility: CLINIC | Age: 63
End: 2018-03-26

## 2018-03-26 ENCOUNTER — TELEPHONE (OUTPATIENT)
Dept: SOCIAL WORK | Facility: HOSPITAL | Age: 63
End: 2018-03-26

## 2018-03-26 NOTE — TELEPHONE ENCOUNTER
Patient called to cancel her appt for tomorrow.  She was seen in the ER yesterday.  She has Pneumonia.  She had an EKG and asks if you would please look at it and call her to discuss.  Thank you/ ARMANDO    Patient's phone number is [phone].

## 2018-03-26 NOTE — TELEPHONE ENCOUNTER
ED f/u phone call: states that she is taking prescribed meds, not feeling much better yet and plans to f/u w/ PCP. No questions/concerns

## 2018-03-27 ENCOUNTER — TELEPHONE (OUTPATIENT)
Dept: FAMILY MEDICINE CLINIC | Facility: CLINIC | Age: 63
End: 2018-03-27

## 2018-03-27 RX ORDER — HYDROCODONE BITARTRATE AND ACETAMINOPHEN 5; 325 MG/1; MG/1
1 TABLET ORAL EVERY 4 HOURS PRN
Qty: 90 TABLET | Refills: 0 | Status: SHIPPED | OUTPATIENT
Start: 2018-03-27 | End: 2018-04-30 | Stop reason: SDUPTHER

## 2018-03-28 RX ORDER — POTASSIUM CHLORIDE 1500 MG/1
20 TABLET, EXTENDED RELEASE ORAL
Qty: 60 TABLET | Refills: 1 | Status: SHIPPED | OUTPATIENT
Start: 2018-03-28 | End: 2018-10-01 | Stop reason: SDUPTHER

## 2018-03-29 ENCOUNTER — TELEPHONE (OUTPATIENT)
Dept: FAMILY MEDICINE CLINIC | Facility: CLINIC | Age: 63
End: 2018-03-29

## 2018-03-29 RX ORDER — OSELTAMIVIR PHOSPHATE 75 MG/1
75 CAPSULE ORAL 2 TIMES DAILY
Qty: 10 CAPSULE | Refills: 0 | Status: SHIPPED | OUTPATIENT
Start: 2018-03-29 | End: 2018-04-03

## 2018-04-02 RX ORDER — CARVEDILOL 6.25 MG/1
TABLET ORAL
Qty: 180 TABLET | Refills: 0 | Status: SHIPPED | OUTPATIENT
Start: 2018-04-02 | End: 2018-06-13 | Stop reason: SDUPTHER

## 2018-04-02 RX ORDER — FUROSEMIDE 40 MG/1
40 TABLET ORAL
Qty: 180 TABLET | Refills: 1 | Status: SHIPPED | OUTPATIENT
Start: 2018-04-02 | End: 2018-10-23 | Stop reason: SDUPTHER

## 2018-04-16 ENCOUNTER — TELEPHONE (OUTPATIENT)
Dept: FAMILY MEDICINE CLINIC | Facility: CLINIC | Age: 63
End: 2018-04-16

## 2018-04-17 ENCOUNTER — TELEPHONE (OUTPATIENT)
Dept: FAMILY MEDICINE CLINIC | Facility: CLINIC | Age: 63
End: 2018-04-17

## 2018-04-17 NOTE — TELEPHONE ENCOUNTER
PATIENTS INHALERS WAS CALLED INTO [facility] AND PATIENT NEEDS TO  RX'S   SPIRIVA HANDIHALER AND ADVAIR DISKUS

## 2018-04-20 RX ORDER — FERROUS SULFATE 325(65) MG
325 TABLET ORAL 2 TIMES DAILY WITH MEALS
Qty: 180 TABLET | Refills: 1 | Status: SHIPPED | OUTPATIENT
Start: 2018-04-20 | End: 2018-10-12 | Stop reason: SDUPTHER

## 2018-04-30 ENCOUNTER — TELEPHONE (OUTPATIENT)
Dept: FAMILY MEDICINE CLINIC | Facility: CLINIC | Age: 63
End: 2018-04-30

## 2018-04-30 RX ORDER — HYDROCODONE BITARTRATE AND ACETAMINOPHEN 5; 325 MG/1; MG/1
1 TABLET ORAL EVERY 4 HOURS PRN
Qty: 90 TABLET | Refills: 0 | Status: SHIPPED | OUTPATIENT
Start: 2018-04-30 | End: 2018-06-01 | Stop reason: SDUPTHER

## 2018-05-01 ENCOUNTER — TELEPHONE (OUTPATIENT)
Dept: FAMILY MEDICINE CLINIC | Facility: CLINIC | Age: 63
End: 2018-05-01

## 2018-05-03 RX ORDER — POTASSIUM CHLORIDE 750 MG/1
10 TABLET, FILM COATED, EXTENDED RELEASE ORAL 2 TIMES DAILY WITH MEALS
Qty: 180 TABLET | Refills: 3 | Status: SHIPPED | OUTPATIENT
Start: 2018-05-03 | End: 2018-06-13 | Stop reason: SDUPTHER

## 2018-05-17 RX ORDER — TEMAZEPAM 30 MG/1
CAPSULE ORAL
Qty: 90 CAPSULE | Refills: 0 | Status: SHIPPED | OUTPATIENT
Start: 2018-05-17 | End: 2018-08-16 | Stop reason: SDUPTHER

## 2018-06-01 ENCOUNTER — TELEPHONE (OUTPATIENT)
Dept: FAMILY MEDICINE CLINIC | Facility: CLINIC | Age: 63
End: 2018-06-01

## 2018-06-01 RX ORDER — HYDROCODONE BITARTRATE AND ACETAMINOPHEN 5; 325 MG/1; MG/1
1 TABLET ORAL EVERY 4 HOURS PRN
Qty: 90 TABLET | Refills: 0 | Status: CANCELLED | OUTPATIENT
Start: 2018-06-01

## 2018-06-01 RX ORDER — HYDROCODONE BITARTRATE AND ACETAMINOPHEN 5; 325 MG/1; MG/1
1 TABLET ORAL EVERY 4 HOURS PRN
Qty: 90 TABLET | Refills: 0 | Status: SHIPPED | OUTPATIENT
Start: 2018-06-01 | End: 2018-07-05 | Stop reason: SDUPTHER

## 2018-06-11 RX ORDER — SERTRALINE HYDROCHLORIDE 100 MG/1
TABLET, FILM COATED ORAL
Qty: 90 TABLET | Refills: 1 | Status: SHIPPED | OUTPATIENT
Start: 2018-06-11 | End: 2018-06-13 | Stop reason: SDUPTHER

## 2018-06-13 ENCOUNTER — OFFICE VISIT (OUTPATIENT)
Dept: FAMILY MEDICINE CLINIC | Facility: CLINIC | Age: 63
End: 2018-06-13

## 2018-06-13 VITALS
HEART RATE: 85 BPM | SYSTOLIC BLOOD PRESSURE: 140 MMHG | HEIGHT: 67 IN | BODY MASS INDEX: 23.54 KG/M2 | DIASTOLIC BLOOD PRESSURE: 50 MMHG | TEMPERATURE: 99 F | WEIGHT: 150 LBS | OXYGEN SATURATION: 97 %

## 2018-06-13 DIAGNOSIS — J20.9 ACUTE BRONCHITIS, UNSPECIFIED ORGANISM: Primary | ICD-10-CM

## 2018-06-13 DIAGNOSIS — J44.1 CHRONIC OBSTRUCTIVE PULMONARY DISEASE WITH ACUTE EXACERBATION (HCC): ICD-10-CM

## 2018-06-13 DIAGNOSIS — I10 ESSENTIAL HYPERTENSION: ICD-10-CM

## 2018-06-13 PROCEDURE — 99213 OFFICE O/P EST LOW 20 MIN: CPT | Performed by: NURSE PRACTITIONER

## 2018-06-13 RX ORDER — ALBUTEROL SULFATE 2.5 MG/3ML
2.5 SOLUTION RESPIRATORY (INHALATION) EVERY 4 HOURS PRN
Qty: 50 VIAL | Refills: 2 | Status: SHIPPED | OUTPATIENT
Start: 2018-06-13 | End: 2018-09-06 | Stop reason: SDUPTHER

## 2018-06-13 RX ORDER — DOXYCYCLINE 100 MG/1
100 CAPSULE ORAL 2 TIMES DAILY
Qty: 20 CAPSULE | Refills: 0 | Status: SHIPPED | OUTPATIENT
Start: 2018-06-13 | End: 2018-07-02 | Stop reason: SDUPTHER

## 2018-06-13 RX ORDER — ROPINIROLE 2 MG/1
TABLET, FILM COATED ORAL
COMMUNITY
Start: 2018-06-10 | End: 2018-12-11

## 2018-06-13 RX ORDER — PREDNISONE 10 MG/1
TABLET ORAL
Qty: 40 TABLET | Refills: 0 | Status: SHIPPED | OUTPATIENT
Start: 2018-06-13 | End: 2018-10-23

## 2018-06-13 NOTE — PATIENT INSTRUCTIONS
erx doxycycline 100 BID x 10 days, erx prednisone taper, refill albuterol nebulizers, cont advair and spiriva and daliresp and FU with pulmonology, cont mucinex and increase H20 and rest, call or RTC if sx persist or worsen, defer CXR d/t recent numerous scans 3 in 1 year, monitor BP and call with log, if persist elevated may need to adjust meds, consider elevated d/t illness

## 2018-06-13 NOTE — PROGRESS NOTES
Subjective   Paz Browne is a 63 y.o. female.     History of Present Illness   C/o sore throat and HA beginning 2 days ago dropping to chest with chest congestion, prod cough, SOA, wheezing, low grade fever and cold chills, using 3L NC oxygen sitting and 4L to walk, no one sick at home, no sinus tenderness or congestion, no sore throat, using nebulizer last 2 days, on advair and spiriva daily, with allergies on claritin 10 mg daily, using OTC mucinex and helping with prod cough, sees pulmonology Dr Melo started on daliresp 250 mg and helped, With hx of R middle lobe pna and RSV was admitted [facility] 01/18 and was seen again for FU 02/18 by me tx doxycycline 100 BID and had FU again in [facility] 03/18 for LL pna and COPD exacerbation tx doxycycline 100 BID and steroid dose pack, was seen by [facility] 04[facility]18 dx acute URI tx zpack and steroid dose pack, former smoker stopped 2007, no smoke exposure, here with  who helps take care of her    The following portions of the patient's history were reviewed and updated as appropriate: allergies, current medications, past family history, past medical history, past social history, past surgical history and problem list.    Review of Systems   Constitutional: Positive for chills, fatigue and fever. Negative for activity change, appetite change, diaphoresis and unexpected weight change.   HENT: Positive for congestion and sore throat. Negative for dental problem, drooling, ear discharge, ear pain, facial swelling, hearing loss, mouth sores, nosebleeds, postnasal drip, rhinorrhea, sinus pain, sinus pressure, sneezing, tinnitus, trouble swallowing and voice change.    Respiratory: Positive for cough, chest tightness, shortness of breath and wheezing. Negative for apnea, choking and stridor.    Cardiovascular: Negative for chest pain, palpitations and leg swelling.   Allergic/Immunologic: Positive for environmental allergies.   Neurological: Positive for  headaches. Negative for dizziness, tremors, seizures, syncope, facial asymmetry, speech difficulty, weakness, light-headedness and numbness.   All other systems reviewed and are negative.      Objective   Physical Exam   Constitutional: She is oriented to person, place, and time. She appears well-developed and well-nourished.   HENT:   Head: Normocephalic and atraumatic.   Right Ear: Hearing and tympanic membrane normal.   Left Ear: Hearing and tympanic membrane normal.   Nose: Nose normal. Right sinus exhibits no maxillary sinus tenderness and no frontal sinus tenderness. Left sinus exhibits no maxillary sinus tenderness and no frontal sinus tenderness.   Mouth/Throat: No oropharyngeal exudate, posterior oropharyngeal edema or posterior oropharyngeal erythema.   Eyes: Conjunctivae and EOM are normal. Pupils are equal, round, and reactive to light.   Neck: Normal range of motion. Neck supple. No thyromegaly present.   Cardiovascular: Normal rate, regular rhythm and normal heart sounds.    Pulmonary/Chest: Effort normal. She has wheezes (throughout all lung fields worse on upper and middle left lobe).   Using 3L NC oxygen   Musculoskeletal: Normal range of motion.   Lymphadenopathy:     She has no cervical adenopathy.   Neurological: She is alert and oriented to person, place, and time.   Skin: Skin is warm and dry.   Psychiatric: She has a normal mood and affect. Her behavior is normal. Judgment and thought content normal.   Vitals reviewed.      Assessment/Plan   Paz was seen today for uri, headache, sore throat, cough and wheezing.    Diagnoses and all orders for this visit:    Acute bronchitis, unspecified organism    Chronic obstructive pulmonary disease with acute exacerbation    Essential hypertension    Other orders  -     doxycycline (MONODOX) 100 MG capsule; Take 1 capsule by mouth 2 (Two) Times a Day.  -     predniSONE (DELTASONE) 10 MG tablet; 4 po QD x 3d, 3 po QD x 3d, 2 po QD x 3d, 1 po QD x 3d  -      albuterol (PROVENTIL) (2.5 MG/3ML) 0.083% nebulizer solution; Take 2.5 mg by nebulization Every 4 (Four) Hours As Needed for Wheezing.    erx doxycycline 100 BID x 10 days, erx prednisone taper, refill albuterol nebulizers, cont advair and spiriva and daliresp and FU with pulmonology, cont mucinex and increase H20 and rest, call or RTC if sx persist or worsen, defer CXR d/t recent numerous scans 3 in 1 year, monitor BP and call with log, if persist elevated may need to adjust meds, consider elevated d/t illness

## 2018-07-02 ENCOUNTER — TELEPHONE (OUTPATIENT)
Dept: FAMILY MEDICINE CLINIC | Facility: CLINIC | Age: 63
End: 2018-07-02

## 2018-07-02 RX ORDER — DOXYCYCLINE 100 MG/1
100 CAPSULE ORAL 2 TIMES DAILY
Qty: 20 CAPSULE | Refills: 0 | Status: SHIPPED | OUTPATIENT
Start: 2018-07-02 | End: 2018-07-18

## 2018-07-02 NOTE — TELEPHONE ENCOUNTER
WAS IN THE WEEK BEFORE AND PUT ON ANTIBIOTIC, AND WAS CALLING [facility] SHE IS BETTER BUT FEELS LIKE SHE MAY NEED ANOTHER SMALL DOSE OF ANTIBIOTIC TO CLEAR UP COMPLETELY, BEFORE IT GETS BAD AGAIN. PLEASE CALL AND LET PT.  KNOW         Documentation

## 2018-07-02 NOTE — TELEPHONE ENCOUNTER
WAS IN THE WEEK BEFORE AND PUT ON ANTIBIOTIC, AND WAS CALLING [facility] SHE IS BETTER BUT FEELS LIKE SHE MAY NEED ANOTHER SMALL DOSE OF ANTIBIOTIC TO CLEAR UP COMPLETELY, BEFORE IT GETS BAD AGAIN. PLEASE CALL AND LET PT.  KNOW

## 2018-07-02 NOTE — TELEPHONE ENCOUNTER
She was last given doxycycline 100 mg BID x 10 days last visit, seems like that works better for her but has taken zpack in April. We can refill doxycycline if she feels like needs another 10 days and can do steroid pack as well if she is still wheezing but if she feels like she is almost better she shouldn't need as well

## 2018-07-03 ENCOUNTER — TELEPHONE (OUTPATIENT)
Dept: FAMILY MEDICINE CLINIC | Facility: CLINIC | Age: 63
End: 2018-07-03

## 2018-07-05 RX ORDER — HYDROCODONE BITARTRATE AND ACETAMINOPHEN 5; 325 MG/1; MG/1
1 TABLET ORAL EVERY 4 HOURS PRN
Qty: 90 TABLET | Refills: 0 | Status: SHIPPED | OUTPATIENT
Start: 2018-07-05 | End: 2018-08-06 | Stop reason: SDUPTHER

## 2018-07-10 RX ORDER — CARVEDILOL 6.25 MG/1
TABLET ORAL
Qty: 180 TABLET | Refills: 0 | Status: SHIPPED | OUTPATIENT
Start: 2018-07-10 | End: 2018-07-25 | Stop reason: SDUPTHER

## 2018-07-17 ENCOUNTER — TELEPHONE (OUTPATIENT)
Dept: FAMILY MEDICINE CLINIC | Facility: CLINIC | Age: 63
End: 2018-07-17

## 2018-07-17 NOTE — TELEPHONE ENCOUNTER
Try to work her in first thing Thursday needs to go and then [facility] cannot wait

## 2018-07-18 ENCOUNTER — OFFICE VISIT (OUTPATIENT)
Dept: FAMILY MEDICINE CLINIC | Facility: CLINIC | Age: 63
End: 2018-07-18

## 2018-07-18 ENCOUNTER — TELEPHONE (OUTPATIENT)
Dept: FAMILY MEDICINE CLINIC | Facility: CLINIC | Age: 63
End: 2018-07-18

## 2018-07-18 VITALS
HEIGHT: 67 IN | SYSTOLIC BLOOD PRESSURE: 110 MMHG | BODY MASS INDEX: 22.6 KG/M2 | DIASTOLIC BLOOD PRESSURE: 60 MMHG | TEMPERATURE: 98.6 F | HEART RATE: 82 BPM | OXYGEN SATURATION: 100 % | WEIGHT: 144 LBS | RESPIRATION RATE: 16 BRPM

## 2018-07-18 DIAGNOSIS — J44.1 CHRONIC OBSTRUCTIVE PULMONARY DISEASE WITH ACUTE EXACERBATION (HCC): Primary | ICD-10-CM

## 2018-07-18 DIAGNOSIS — J18.9 PNEUMONIA OF RIGHT UPPER LOBE DUE TO INFECTIOUS ORGANISM: ICD-10-CM

## 2018-07-18 PROCEDURE — 99213 OFFICE O/P EST LOW 20 MIN: CPT | Performed by: INTERNAL MEDICINE

## 2018-07-18 RX ORDER — CIPROFLOXACIN 500 MG/1
500 TABLET, FILM COATED ORAL EVERY 12 HOURS SCHEDULED
Qty: 20 TABLET | Refills: 0 | Status: SHIPPED | OUTPATIENT
Start: 2018-07-18 | End: 2018-07-25

## 2018-07-18 NOTE — TELEPHONE ENCOUNTER
IS CALLING [facility] SHE WAS SEEN FOR DOUBLE PNEUMONIA A COUPLE WEEKS AGO AND SHE SAID IT IS MUCH BETTER BUT WANTS A Z-PACK TO FINISH UP THE WHEEZING THAT SHE HAS LEFT, CALLED INTO [facility], IF JAC WONT PRESCRIBE CALL PT AND LET HER KNOW.

## 2018-07-18 NOTE — TELEPHONE ENCOUNTER
She needs to be seen, office policy to is not call in abx without eval and had abx already called in once this month. Needs to keep apt with Dr soriano today at 5

## 2018-07-18 NOTE — PROGRESS NOTES
Subjective   Paz Browne is a 63 y.o. female.     History of Present Illness   Patient was seen for pneumonia.  She has been treated previously and was given doxycycline and a Z-Antonio.  She's had multiple chest x-rays which indicates the pneumonia.  Patient was advised to have a CT scan of the chest and she will consider it.  She was given Cipro and asked to follow-up in one week.    Dictated utilizing Dragon dictation. If there are questions or for further clarification, please contact me.   The following portions of the patient's history were reviewed and updated as appropriate: allergies, current medications, past family history, past medical history, past social history, past surgical history and problem list.    Review of Systems   Constitutional: Negative for fatigue and fever.   HENT: Positive for congestion. Negative for trouble swallowing.    Eyes: Negative for discharge and visual disturbance.   Respiratory: Positive for cough. Negative for choking and shortness of breath.    Cardiovascular: Negative for chest pain and palpitations.   Gastrointestinal: Negative for abdominal pain and blood in stool.   Endocrine: Negative.    Genitourinary: Negative for genital sores and hematuria.   Musculoskeletal: Negative for gait problem and joint swelling.   Skin: Negative for color change, pallor, rash and wound.   Allergic/Immunologic: Positive for environmental allergies. Negative for immunocompromised state.   Neurological: Negative for facial asymmetry and speech difficulty.   Psychiatric/Behavioral: Negative for hallucinations and suicidal ideas.       Objective   Physical Exam   Constitutional: She is oriented to person, place, and time. She appears well-developed and well-nourished.   HENT:   Head: Normocephalic.   Eyes: Pupils are equal, round, and reactive to light. Conjunctivae are normal.   Neck: Normal range of motion. Neck supple.   Cardiovascular: Normal rate, regular rhythm and normal heart sounds.   Exam reveals no gallop and no friction rub.    No murmur heard.  Pulmonary/Chest: Effort normal. No respiratory distress. She has wheezes. She has no rales. She exhibits no tenderness.   Abdominal: Soft. Bowel sounds are normal.   Musculoskeletal: Normal range of motion.   Neurological: She is alert and oriented to person, place, and time.   Skin: Skin is warm and dry.   Psychiatric: She has a normal mood and affect. Her behavior is normal. Judgment and thought content normal.   Nursing note and vitals reviewed.      Assessment/Plan   Problems Addressed this Visit        Respiratory    Chronic obstructive pulmonary disease with acute exacerbation (CMS/HCC) - Primary    Pneumonia due to infectious organism    Relevant Medications    ciprofloxacin (CIPRO) 500 MG tablet

## 2018-07-18 NOTE — TELEPHONE ENCOUNTER
PT NEEDS A 1 WEEK FOLLOW UP WITH RLS, AND SHE NEEDS IT EITHER Wednesday OR Thursday AS LATE IN THE DAY AS YOU CAN GET IT.   I CHECKED HER OUT AND RLS % ON 7-25-18, SO I DIDN'T KNOW WHERE YOU WOULD WANT TO PUT HER...SABRINA

## 2018-07-25 ENCOUNTER — OFFICE VISIT (OUTPATIENT)
Dept: FAMILY MEDICINE CLINIC | Facility: CLINIC | Age: 63
End: 2018-07-25

## 2018-07-25 VITALS
SYSTOLIC BLOOD PRESSURE: 122 MMHG | HEIGHT: 67 IN | DIASTOLIC BLOOD PRESSURE: 70 MMHG | TEMPERATURE: 98.2 F | OXYGEN SATURATION: 93 % | WEIGHT: 146.4 LBS | BODY MASS INDEX: 22.98 KG/M2 | HEART RATE: 105 BPM

## 2018-07-25 DIAGNOSIS — J18.9 PNEUMONIA OF RIGHT UPPER LOBE DUE TO INFECTIOUS ORGANISM: Primary | ICD-10-CM

## 2018-07-25 PROBLEM — J44.1 ACUTE EXACERBATION OF CHRONIC OBSTRUCTIVE PULMONARY DISEASE (COPD) (HCC): Status: RESOLVED | Noted: 2018-01-02 | Resolved: 2018-07-25

## 2018-07-25 PROCEDURE — 99213 OFFICE O/P EST LOW 20 MIN: CPT | Performed by: INTERNAL MEDICINE

## 2018-08-06 ENCOUNTER — TELEPHONE (OUTPATIENT)
Dept: FAMILY MEDICINE CLINIC | Facility: CLINIC | Age: 63
End: 2018-08-06

## 2018-08-06 RX ORDER — HYDROCODONE BITARTRATE AND ACETAMINOPHEN 5; 325 MG/1; MG/1
1 TABLET ORAL EVERY 8 HOURS PRN
Qty: 90 TABLET | Refills: 0 | Status: SHIPPED | OUTPATIENT
Start: 2018-08-06 | End: 2018-09-06 | Stop reason: SDUPTHER

## 2018-08-15 ENCOUNTER — TELEPHONE (OUTPATIENT)
Dept: FAMILY MEDICINE CLINIC | Facility: CLINIC | Age: 63
End: 2018-08-15

## 2018-08-17 RX ORDER — TEMAZEPAM 30 MG/1
CAPSULE ORAL
Qty: 90 CAPSULE | Refills: 0 | Status: SHIPPED | OUTPATIENT
Start: 2018-08-17 | End: 2018-11-12 | Stop reason: SDUPTHER

## 2018-09-06 ENCOUNTER — TELEPHONE (OUTPATIENT)
Dept: FAMILY MEDICINE CLINIC | Facility: CLINIC | Age: 63
End: 2018-09-06

## 2018-09-06 RX ORDER — HYDROCODONE BITARTRATE AND ACETAMINOPHEN 5; 325 MG/1; MG/1
1 TABLET ORAL EVERY 8 HOURS PRN
Qty: 90 TABLET | Refills: 0 | Status: SHIPPED | OUTPATIENT
Start: 2018-09-06 | End: 2018-10-08 | Stop reason: SDUPTHER

## 2018-09-07 RX ORDER — ALBUTEROL SULFATE 2.5 MG/3ML
SOLUTION RESPIRATORY (INHALATION)
Qty: 150 ML | Refills: 2 | Status: SHIPPED | OUTPATIENT
Start: 2018-09-07 | End: 2018-11-25 | Stop reason: SDUPTHER

## 2018-09-07 RX ORDER — FUROSEMIDE 20 MG/1
TABLET ORAL
Qty: 90 TABLET | Refills: 1 | Status: SHIPPED | OUTPATIENT
Start: 2018-09-07 | End: 2018-10-08

## 2018-09-26 ENCOUNTER — TELEPHONE (OUTPATIENT)
Dept: FAMILY MEDICINE CLINIC | Facility: CLINIC | Age: 63
End: 2018-09-26

## 2018-09-26 NOTE — TELEPHONE ENCOUNTER
Pt informed needs to find out who has it get paper work set up appt for face to face and we can order after documentation.

## 2018-10-02 RX ORDER — POTASSIUM CHLORIDE 1500 MG/1
20 TABLET, EXTENDED RELEASE ORAL
Qty: 60 TABLET | Refills: 1 | Status: SHIPPED | OUTPATIENT
Start: 2018-10-02 | End: 2018-10-23 | Stop reason: SDUPTHER

## 2018-10-08 ENCOUNTER — TELEPHONE (OUTPATIENT)
Dept: FAMILY MEDICINE CLINIC | Facility: CLINIC | Age: 63
End: 2018-10-08

## 2018-10-08 RX ORDER — ATORVASTATIN CALCIUM 40 MG/1
40 TABLET, FILM COATED ORAL DAILY
Qty: 90 TABLET | Refills: 2 | Status: SHIPPED | OUTPATIENT
Start: 2018-10-08 | End: 2018-12-11

## 2018-10-08 RX ORDER — CARVEDILOL 6.25 MG/1
TABLET ORAL
Qty: 60 TABLET | Refills: 0 | Status: SHIPPED | OUTPATIENT
Start: 2018-10-08 | End: 2018-11-08 | Stop reason: SDUPTHER

## 2018-10-08 RX ORDER — HYDROCODONE BITARTRATE AND ACETAMINOPHEN 5; 325 MG/1; MG/1
1 TABLET ORAL EVERY 8 HOURS PRN
Qty: 90 TABLET | Refills: 0 | Status: SHIPPED | OUTPATIENT
Start: 2018-10-08 | End: 2018-11-07 | Stop reason: SDUPTHER

## 2018-10-08 NOTE — TELEPHONE ENCOUNTER
[phone] Please call pt if there is a problem at [phone]

## 2018-10-09 RX ORDER — ATORVASTATIN CALCIUM 40 MG/1
40 TABLET, FILM COATED ORAL DAILY
Qty: 90 TABLET | Refills: 2 | Status: SHIPPED | OUTPATIENT
Start: 2018-10-09 | End: 2019-12-23

## 2018-10-12 RX ORDER — FERROUS SULFATE 325(65) MG
325 TABLET ORAL 2 TIMES DAILY WITH MEALS
Qty: 180 TABLET | Refills: 1 | Status: SHIPPED | OUTPATIENT
Start: 2018-10-12 | End: 2019-04-05 | Stop reason: SDUPTHER

## 2018-10-19 NOTE — TELEPHONE ENCOUNTER
Stephanie request received for sucralfate 1 gm - 1 tab po ac meals and at bedimte, @120, R2.  See note of 12/28/17 - pt was to have weekly lb and f/u with APRN 1/19/18 - pt cancelled f/u appt.  No further labs.  Message to Dr Devine.

## 2018-10-22 RX ORDER — SUCRALFATE 1 G/1
TABLET ORAL
Qty: 120 TABLET | Refills: 2 | Status: SHIPPED | OUTPATIENT
Start: 2018-10-22 | End: 2019-07-10 | Stop reason: HOSPADM

## 2018-10-23 ENCOUNTER — OFFICE VISIT (OUTPATIENT)
Dept: FAMILY MEDICINE CLINIC | Facility: CLINIC | Age: 63
End: 2018-10-23

## 2018-10-23 VITALS
OXYGEN SATURATION: 99 % | SYSTOLIC BLOOD PRESSURE: 138 MMHG | HEART RATE: 96 BPM | BODY MASS INDEX: 22.63 KG/M2 | WEIGHT: 144.2 LBS | DIASTOLIC BLOOD PRESSURE: 72 MMHG | TEMPERATURE: 98 F | HEIGHT: 67 IN

## 2018-10-23 DIAGNOSIS — M25.551 RIGHT HIP PAIN: ICD-10-CM

## 2018-10-23 DIAGNOSIS — G89.29 CHRONIC BILATERAL LOW BACK PAIN WITH RIGHT-SIDED SCIATICA: ICD-10-CM

## 2018-10-23 DIAGNOSIS — M51.36 DDD (DEGENERATIVE DISC DISEASE), LUMBAR: ICD-10-CM

## 2018-10-23 DIAGNOSIS — I50.33 ACUTE ON CHRONIC DIASTOLIC CONGESTIVE HEART FAILURE (HCC): ICD-10-CM

## 2018-10-23 DIAGNOSIS — I48.0 PAF (PAROXYSMAL ATRIAL FIBRILLATION) (HCC): ICD-10-CM

## 2018-10-23 DIAGNOSIS — M54.41 CHRONIC BILATERAL LOW BACK PAIN WITH RIGHT-SIDED SCIATICA: ICD-10-CM

## 2018-10-23 DIAGNOSIS — J44.9 CHRONIC OBSTRUCTIVE PULMONARY DISEASE, UNSPECIFIED COPD TYPE (HCC): ICD-10-CM

## 2018-10-23 DIAGNOSIS — E78.2 MIXED HYPERLIPIDEMIA: ICD-10-CM

## 2018-10-23 DIAGNOSIS — I10 ESSENTIAL HYPERTENSION: ICD-10-CM

## 2018-10-23 DIAGNOSIS — J96.11 CHRONIC RESPIRATORY FAILURE WITH HYPOXIA (HCC): Primary | ICD-10-CM

## 2018-10-23 DIAGNOSIS — Z23 NEED FOR INFLUENZA VACCINATION: ICD-10-CM

## 2018-10-23 LAB
ALBUMIN SERPL-MCNC: 4.4 G/DL (ref 3.5–5.2)
ALBUMIN/GLOB SERPL: 1.5 G/DL
ALP SERPL-CCNC: 83 U/L (ref 39–117)
ALT SERPL W P-5'-P-CCNC: 15 U/L (ref 1–33)
ANION GAP SERPL CALCULATED.3IONS-SCNC: 14.6 MMOL/L
AST SERPL-CCNC: 21 U/L (ref 1–32)
BACTERIA UR QL AUTO: NORMAL /HPF
BILIRUB SERPL-MCNC: 0.5 MG/DL (ref 0.1–1.2)
BILIRUB UR QL STRIP: NEGATIVE
BUN BLD-MCNC: 10 MG/DL (ref 8–23)
BUN/CREAT SERPL: 10.3 (ref 7–25)
CALCIUM SPEC-SCNC: 9.8 MG/DL (ref 8.6–10.5)
CHLORIDE SERPL-SCNC: 97 MMOL/L (ref 98–107)
CHOLEST SERPL-MCNC: 151 MG/DL (ref 0–200)
CLARITY UR: CLEAR
CO2 SERPL-SCNC: 29.4 MMOL/L (ref 22–29)
COLOR UR: YELLOW
CREAT BLD-MCNC: 0.97 MG/DL (ref 0.57–1)
ERYTHROCYTE [DISTWIDTH] IN BLOOD BY AUTOMATED COUNT: 16 % (ref 4.5–15)
GFR SERPL CREATININE-BSD FRML MDRD: 58 ML/MIN/1.73
GLOBULIN UR ELPH-MCNC: 2.9 GM/DL
GLUCOSE BLD-MCNC: 80 MG/DL (ref 65–99)
GLUCOSE UR STRIP-MCNC: NEGATIVE MG/DL
HCT VFR BLD AUTO: 36.7 % (ref 31–42)
HDLC SERPL-MCNC: 50 MG/DL (ref 40–60)
HGB BLD-MCNC: 11.8 G/DL (ref 12–18)
HGB UR QL STRIP.AUTO: NEGATIVE
KETONES UR QL STRIP: NEGATIVE
LDLC SERPL CALC-MCNC: 57 MG/DL (ref 0–100)
LDLC/HDLC SERPL: 1.14 {RATIO}
LEUKOCYTE ESTERASE UR QL STRIP.AUTO: ABNORMAL
LYMPHOCYTES # BLD AUTO: 1.7 10*3/MM3 (ref 1.2–3.4)
LYMPHOCYTES NFR BLD AUTO: 28 % (ref 21–51)
MCH RBC QN AUTO: 26.8 PG (ref 26.1–33.1)
MCHC RBC AUTO-ENTMCNC: 32.1 G/DL (ref 33–37)
MCV RBC AUTO: 83.6 FL (ref 80–99)
MONOCYTES # BLD AUTO: 0.3 10*3/MM3 (ref 0.1–0.6)
MONOCYTES NFR BLD AUTO: 5.2 % (ref 2–9)
NEUTROPHILS # BLD AUTO: 4.1 10*3/MM3 (ref 1.4–6.5)
NEUTROPHILS NFR BLD AUTO: 66.8 % (ref 42–75)
NITRITE UR QL STRIP: NEGATIVE
PH UR STRIP.AUTO: 6 [PH] (ref 4.6–8)
PLATELET # BLD AUTO: 240 10*3/MM3 (ref 150–450)
PMV BLD AUTO: 7.9 FL (ref 7.1–10.5)
POTASSIUM BLD-SCNC: 3.5 MMOL/L (ref 3.5–5.2)
PROT SERPL-MCNC: 7.3 G/DL (ref 6–8.5)
PROT UR QL STRIP: NEGATIVE
RBC # BLD AUTO: 4.39 10*6/MM3 (ref 4–6)
RBC # UR: NORMAL /HPF
REF LAB TEST METHOD: NORMAL
SODIUM BLD-SCNC: 141 MMOL/L (ref 136–145)
SP GR UR STRIP: 1.01 (ref 1–1.03)
SQUAMOUS #/AREA URNS HPF: NORMAL /HPF
TRIGL SERPL-MCNC: 219 MG/DL (ref 0–150)
TSH SERPL DL<=0.05 MIU/L-ACNC: 0.51 MIU/ML (ref 0.27–4.2)
UROBILINOGEN UR QL STRIP: ABNORMAL
VLDLC SERPL-MCNC: 43.8 MG/DL (ref 5–40)
WBC NRBC COR # BLD: 6.2 10*3/MM3 (ref 4.5–10)
WBC UR QL AUTO: NORMAL /HPF

## 2018-10-23 PROCEDURE — 85025 COMPLETE CBC W/AUTO DIFF WBC: CPT | Performed by: NURSE PRACTITIONER

## 2018-10-23 PROCEDURE — 81001 URINALYSIS AUTO W/SCOPE: CPT | Performed by: NURSE PRACTITIONER

## 2018-10-23 PROCEDURE — G0008 ADMIN INFLUENZA VIRUS VAC: HCPCS | Performed by: NURSE PRACTITIONER

## 2018-10-23 PROCEDURE — 80053 COMPREHEN METABOLIC PANEL: CPT | Performed by: NURSE PRACTITIONER

## 2018-10-23 PROCEDURE — 80061 LIPID PANEL: CPT | Performed by: NURSE PRACTITIONER

## 2018-10-23 PROCEDURE — 36415 COLL VENOUS BLD VENIPUNCTURE: CPT | Performed by: NURSE PRACTITIONER

## 2018-10-23 PROCEDURE — 84443 ASSAY THYROID STIM HORMONE: CPT | Performed by: NURSE PRACTITIONER

## 2018-10-23 PROCEDURE — 90662 IIV NO PRSV INCREASED AG IM: CPT | Performed by: NURSE PRACTITIONER

## 2018-10-23 PROCEDURE — 99214 OFFICE O/P EST MOD 30 MIN: CPT | Performed by: NURSE PRACTITIONER

## 2018-10-23 RX ORDER — FUROSEMIDE 40 MG/1
40 TABLET ORAL DAILY
Qty: 90 TABLET | Refills: 1 | Status: SHIPPED | OUTPATIENT
Start: 2018-10-23 | End: 2019-12-22

## 2018-10-23 RX ORDER — POTASSIUM CHLORIDE 20 MEQ/1
20 TABLET, EXTENDED RELEASE ORAL DAILY
Qty: 90 TABLET | Refills: 1 | Status: SHIPPED | OUTPATIENT
Start: 2018-10-23 | End: 2019-05-23 | Stop reason: SDUPTHER

## 2018-10-23 NOTE — PROGRESS NOTES
Subjective   Paz Browne is a 63 y.o. female.     History of Present Illness   Here to FU on chronic COPD and chronic respiratory failury, on spiriva and advair inahler daily, daliresp tablet daily and uses nebulizer BID and albuterol inhaler rarely, using oxygen 3L sitting and 4L walking, sees pulmonology Dr Melo last seen 02/18 with pulmonary function testing showing severe obstruction, with difficulty breathing with walking and request scooter, with chronic back pain lumbar DDD and R hip pain on hydrocodone 5/325 mg every 8 hrs prn and also notes prolonged walking makes pain worse  With chronic well controlled HTN on coreg 6.25 BID, lasix 40 mg, potassium 20 mEq, lisinopril 5 mg, no CP dizziness LE edema with intermittent HA, checks BP at home runs similar today, sees Dr Gan cardiology for PAF and CHF, With chonic chol on lipitor 40 mg HS    The following portions of the patient's history were reviewed and updated as appropriate: allergies, current medications, past family history, past medical history, past social history, past surgical history and problem list.    Review of Systems   Constitutional: Negative for fever.   Respiratory: Positive for cough, chest tightness, shortness of breath and wheezing. Negative for apnea, choking and stridor.    Cardiovascular: Negative for chest pain, palpitations and leg swelling.   Musculoskeletal: Positive for arthralgias, back pain, gait problem and myalgias. Negative for joint swelling, neck pain and neck stiffness.   Neurological: Negative for dizziness and headaches.   All other systems reviewed and are negative.      Objective   Physical Exam   Constitutional: She is oriented to person, place, and time. She appears well-developed and well-nourished.   HENT:   Head: Normocephalic and atraumatic.   Eyes: Pupils are equal, round, and reactive to light. Conjunctivae and EOM are normal.   Cardiovascular: Normal rate, regular rhythm and normal heart sounds.     Pulmonary/Chest: Effort normal and breath sounds normal.   Wearing 3L oxygen NC   Musculoskeletal: Normal range of motion. She exhibits tenderness (lumbar generalized with stiff ROM; R hip stiff ROM, gait affected).   Neurological: She is alert and oriented to person, place, and time.   Skin: Skin is warm and dry.   Psychiatric: She has a normal mood and affect. Her behavior is normal. Judgment and thought content normal.   Vitals reviewed.      Assessment/Plan   Paz was seen today for med refill.    Diagnoses and all orders for this visit:    Chronic respiratory failure with hypoxia (CMS/[facility])    Chronic obstructive pulmonary disease, unspecified COPD type (CMS/[facility])    Essential hypertension  -     CBC & Differential  -     Comprehensive Metabolic Panel  -     Lipid Panel  -     TSH  -     Urinalysis With Microscopic - Urine, Clean Catch  -     CBC Auto Differential  -     Urinalysis without microscopic (no culture) - Urine, Clean Catch  -     Urinalysis, Microscopic Only - Urine, Clean Catch    Mixed hyperlipidemia  -     Comprehensive Metabolic Panel  -     Lipid Panel    Acute on chronic diastolic congestive heart failure (CMS/[facility])  -     CBC & Differential  -     Comprehensive Metabolic Panel  -     TSH  -     Urinalysis With Microscopic - Urine, Clean Catch  -     CBC Auto Differential  -     Urinalysis without microscopic (no culture) - Urine, Clean Catch  -     Urinalysis, Microscopic Only - Urine, Clean Catch    PAF (paroxysmal atrial fibrillation) (CMS/[facility])    Chronic bilateral low back pain with right-sided sciatica    DDD (degenerative disc disease), lumbar    Right hip pain    Need for influenza vaccination    Other orders  -     Discontinue: tiotropium (SPIRIVA HANDIHALER) 18 MCG per inhalation capsule; Place 1 capsule into inhaler and inhale Daily.  -     furosemide (LASIX) 40 MG tablet; Take 1 tablet by mouth Daily.  -     potassium chloride (KLOR-CON) 20 MEQ CR tablet; Take 1 tablet by mouth  Daily.  -     Discontinue: fluticasone-salmeterol (ADVAIR DISKUS) 250-50 MCG/DOSE DISKUS; Inhale 1 puff 2 (Two) Times a Day.  -     Fluzone High Dose =>65Years  -     tiotropium (SPIRIVA HANDIHALER) 18 MCG per inhalation capsule; Place 1 capsule into inhaler and inhale Daily.  -     fluticasone-salmeterol (ADVAIR DISKUS) 250-50 MCG/DOSE DISKUS; Inhale 1 puff 2 (Two) Times a Day.    cont FU with pulmonology and refill all chronic dz meds, FU with cardiology and monitor BP at home, order OT/PT mobility evaluation for scooter, flu shot today

## 2018-10-24 ENCOUNTER — TELEPHONE (OUTPATIENT)
Dept: FAMILY MEDICINE CLINIC | Facility: CLINIC | Age: 63
End: 2018-10-24

## 2018-10-24 NOTE — TELEPHONE ENCOUNTER
Pt informed    ----- Message from BA Burnham sent at 10/24/2018  2:54 PM EDT -----  Thyroid, BS, kidney, liver normal. Chol well controlled but triglycerides elevated 219 recommend fish oil 1000 mg daily and cont all chronic dz meds. Ordered OT/PT mobility eval for giovanny

## 2018-10-24 NOTE — TELEPHONE ENCOUNTER
Her urine showed no bacteria on microscope yesterday, enc increase H20 8 glasses daily, wipe front to back and enc freq toileting and if sx persist or worsen she can leave another urine in a few days

## 2018-10-24 NOTE — PATIENT INSTRUCTIONS
cont FU with pulmonology and refill all chronic dz meds, FU with cardiology and monitor BP at home, order OT/PT mobility evaluation for scooter, flu shot today

## 2018-10-29 RX ORDER — OMEPRAZOLE 40 MG/1
CAPSULE, DELAYED RELEASE ORAL
Qty: 90 CAPSULE | Refills: 1 | Status: SHIPPED | OUTPATIENT
Start: 2018-10-29 | End: 2019-05-09 | Stop reason: SDUPTHER

## 2018-11-07 ENCOUNTER — TELEPHONE (OUTPATIENT)
Dept: FAMILY MEDICINE CLINIC | Facility: CLINIC | Age: 63
End: 2018-11-07

## 2018-11-07 RX ORDER — HYDROCODONE BITARTRATE AND ACETAMINOPHEN 5; 325 MG/1; MG/1
1 TABLET ORAL EVERY 8 HOURS PRN
Qty: 90 TABLET | Refills: 0 | Status: SHIPPED | OUTPATIENT
Start: 2018-11-07 | End: 2018-12-11 | Stop reason: SDUPTHER

## 2018-11-07 NOTE — TELEPHONE ENCOUNTER
PT STOPPED BY SHE NEEDS A REFILL ON HER HYDROCODONE 5-325 MG ..PLEASE CALL THIS INTO [phone] ....

## 2018-11-12 RX ORDER — CARVEDILOL 6.25 MG/1
TABLET ORAL
Qty: 60 TABLET | Refills: 0 | Status: SHIPPED | OUTPATIENT
Start: 2018-11-12 | End: 2018-12-10 | Stop reason: SDUPTHER

## 2018-11-13 RX ORDER — TEMAZEPAM 30 MG/1
CAPSULE ORAL
Qty: 90 CAPSULE | Refills: 0 | Status: SHIPPED | OUTPATIENT
Start: 2018-11-13 | End: 2018-12-18 | Stop reason: SDUPTHER

## 2018-11-20 ENCOUNTER — TELEPHONE (OUTPATIENT)
Dept: FAMILY MEDICINE CLINIC | Facility: CLINIC | Age: 63
End: 2018-11-20

## 2018-11-20 NOTE — TELEPHONE ENCOUNTER
PT'S  CALLED STATING THAT THE TEMAZEPAM IS ON BACKORDER AT THE PHARMACY IT WAS SENT TO, AND HE IS CALLING TO SEE IF IT CAN BE SENT ELSEWHERE? ASKED FOR A CALL BACK TO LET HIM KNOW BEFORE IT IS DONE PLEASE

## 2018-11-21 ENCOUNTER — TELEPHONE (OUTPATIENT)
Dept: FAMILY MEDICINE CLINIC | Facility: CLINIC | Age: 63
End: 2018-11-21

## 2018-11-21 NOTE — TELEPHONE ENCOUNTER
Patient wanted it sent to [facility] so I called it in for patient since RLS already approved.

## 2018-11-26 RX ORDER — ALBUTEROL SULFATE 2.5 MG/3ML
SOLUTION RESPIRATORY (INHALATION)
Qty: 150 ML | Refills: 2 | Status: SHIPPED | OUTPATIENT
Start: 2018-11-26 | End: 2019-02-08 | Stop reason: SDUPTHER

## 2018-12-04 RX ORDER — LISINOPRIL 5 MG/1
TABLET ORAL
Qty: 90 TABLET | Refills: 3 | Status: SHIPPED | OUTPATIENT
Start: 2018-12-04 | End: 2019-01-14 | Stop reason: SDUPTHER

## 2018-12-05 RX ORDER — ROPINIROLE 2 MG/1
2 TABLET, FILM COATED ORAL NIGHTLY
Qty: 90 TABLET | Refills: 2 | Status: SHIPPED | OUTPATIENT
Start: 2018-12-05 | End: 2019-09-06 | Stop reason: SDUPTHER

## 2018-12-10 ENCOUNTER — TELEPHONE (OUTPATIENT)
Dept: FAMILY MEDICINE CLINIC | Facility: CLINIC | Age: 63
End: 2018-12-10

## 2018-12-10 NOTE — TELEPHONE ENCOUNTER
Patient informed she says will come in sign contract and do urine. She wants to know if this needs to be done prior to refilling medication?

## 2018-12-11 DIAGNOSIS — Z02.89 PAIN MEDICATION AGREEMENT: Primary | ICD-10-CM

## 2018-12-11 DIAGNOSIS — Z79.891 LONG TERM CURRENT USE OF OPIATE ANALGESIC: ICD-10-CM

## 2018-12-11 RX ORDER — HYDROCODONE BITARTRATE AND ACETAMINOPHEN 5; 325 MG/1; MG/1
1 TABLET ORAL EVERY 8 HOURS PRN
Qty: 90 TABLET | Refills: 0 | Status: SHIPPED | OUTPATIENT
Start: 2018-12-11 | End: 2019-01-14 | Stop reason: SDUPTHER

## 2018-12-12 RX ORDER — SERTRALINE HYDROCHLORIDE 100 MG/1
TABLET, FILM COATED ORAL
Qty: 90 TABLET | Refills: 1 | Status: SHIPPED | OUTPATIENT
Start: 2018-12-12 | End: 2019-03-15 | Stop reason: SDUPTHER

## 2018-12-12 RX ORDER — CARVEDILOL 6.25 MG/1
TABLET ORAL
Qty: 60 TABLET | Refills: 0 | Status: SHIPPED | OUTPATIENT
Start: 2018-12-12 | End: 2018-12-20 | Stop reason: SDUPTHER

## 2018-12-17 LAB — CONV REPORT SUMMARY: NORMAL

## 2018-12-18 RX ORDER — TEMAZEPAM 30 MG/1
CAPSULE ORAL
Qty: 30 CAPSULE | Refills: 0 | Status: SHIPPED | OUTPATIENT
Start: 2018-12-18 | End: 2019-01-15 | Stop reason: SDUPTHER

## 2018-12-21 RX ORDER — CARVEDILOL 6.25 MG/1
TABLET ORAL
Qty: 60 TABLET | Refills: 0 | Status: SHIPPED | OUTPATIENT
Start: 2018-12-21 | End: 2019-01-14 | Stop reason: SDUPTHER

## 2019-01-08 RX ORDER — CYCLOBENZAPRINE HCL 10 MG
TABLET ORAL
Qty: 90 TABLET | Refills: 3 | Status: SHIPPED | OUTPATIENT
Start: 2019-01-08 | End: 2019-01-14 | Stop reason: SDUPTHER

## 2019-01-09 ENCOUNTER — APPOINTMENT (OUTPATIENT)
Dept: GENERAL RADIOLOGY | Facility: HOSPITAL | Age: 64
End: 2019-01-09

## 2019-01-09 ENCOUNTER — HOSPITAL ENCOUNTER (EMERGENCY)
Facility: HOSPITAL | Age: 64
Discharge: HOME | End: 2019-01-09
Attending: EMERGENCY MEDICINE | Admitting: EMERGENCY MEDICINE

## 2019-01-09 VITALS
SYSTOLIC BLOOD PRESSURE: 133 MMHG | DIASTOLIC BLOOD PRESSURE: 77 MMHG | WEIGHT: 135 LBS | TEMPERATURE: 99.6 F | OXYGEN SATURATION: 100 % | BODY MASS INDEX: 21.19 KG/M2 | HEIGHT: 67 IN | RESPIRATION RATE: 21 BRPM | HEART RATE: 74 BPM

## 2019-01-09 DIAGNOSIS — B34.2 CORONAVIRUS INFECTION: Primary | ICD-10-CM

## 2019-01-09 DIAGNOSIS — J44.1 COPD EXACERBATION (HCC): ICD-10-CM

## 2019-01-09 DIAGNOSIS — N17.9 AKI (ACUTE KIDNEY INJURY) (HCC): ICD-10-CM

## 2019-01-09 LAB
ANION GAP SERPL CALCULATED.3IONS-SCNC: 16.6 MMOL/L
B PARAPERT DNA SPEC QL NAA+PROBE: NOT DETECTED
B PERT DNA SPEC QL NAA+PROBE: NOT DETECTED
BASOPHILS # BLD AUTO: 0.02 10*3/MM3 (ref 0–0.2)
BASOPHILS NFR BLD AUTO: 0.2 % (ref 0–1.5)
BUN BLD-MCNC: 14 MG/DL (ref 8–23)
BUN/CREAT SERPL: 10.7 (ref 7–25)
C PNEUM DNA NPH QL NAA+NON-PROBE: NOT DETECTED
CALCIUM SPEC-SCNC: 9.7 MG/DL (ref 8.6–10.5)
CHLORIDE SERPL-SCNC: 99 MMOL/L (ref 98–107)
CO2 SERPL-SCNC: 25.4 MMOL/L (ref 22–29)
CREAT BLD-MCNC: 1.31 MG/DL (ref 0.57–1)
DEPRECATED RDW RBC AUTO: 43.9 FL (ref 37–54)
EOSINOPHIL # BLD AUTO: 0.05 10*3/MM3 (ref 0–0.7)
EOSINOPHIL NFR BLD AUTO: 0.5 % (ref 0.3–6.2)
ERYTHROCYTE [DISTWIDTH] IN BLOOD BY AUTOMATED COUNT: 13.1 % (ref 11.7–13)
FLUAV H1 2009 PAND RNA NPH QL NAA+PROBE: NOT DETECTED
FLUAV H1 HA GENE NPH QL NAA+PROBE: NOT DETECTED
FLUAV H3 RNA NPH QL NAA+PROBE: NOT DETECTED
FLUAV SUBTYP SPEC NAA+PROBE: NOT DETECTED
FLUBV RNA ISLT QL NAA+PROBE: NOT DETECTED
GFR SERPL CREATININE-BSD FRML MDRD: 41 ML/MIN/1.73
GLUCOSE BLD-MCNC: 77 MG/DL (ref 65–99)
HADV DNA SPEC NAA+PROBE: NOT DETECTED
HCOV 229E RNA SPEC QL NAA+PROBE: NOT DETECTED
HCOV HKU1 RNA SPEC QL NAA+PROBE: NOT DETECTED
HCOV NL63 RNA SPEC QL NAA+PROBE: NOT DETECTED
HCOV OC43 RNA SPEC QL NAA+PROBE: DETECTED
HCT VFR BLD AUTO: 37.4 % (ref 35.6–45.5)
HGB BLD-MCNC: 11.8 G/DL (ref 11.9–15.5)
HMPV RNA NPH QL NAA+NON-PROBE: NOT DETECTED
HOLD SPECIMEN: NORMAL
HOLD SPECIMEN: NORMAL
HPIV1 RNA SPEC QL NAA+PROBE: NOT DETECTED
HPIV2 RNA SPEC QL NAA+PROBE: NOT DETECTED
HPIV3 RNA NPH QL NAA+PROBE: NOT DETECTED
HPIV4 P GENE NPH QL NAA+PROBE: NOT DETECTED
IMM GRANULOCYTES # BLD AUTO: 0.09 10*3/MM3 (ref 0–0.03)
IMM GRANULOCYTES NFR BLD AUTO: 0.8 % (ref 0–0.5)
LYMPHOCYTES # BLD AUTO: 1.39 10*3/MM3 (ref 0.9–4.8)
LYMPHOCYTES NFR BLD AUTO: 12.8 % (ref 19.6–45.3)
M PNEUMO IGG SER IA-ACNC: NOT DETECTED
MCH RBC QN AUTO: 29.1 PG (ref 26.9–32)
MCHC RBC AUTO-ENTMCNC: 31.6 G/DL (ref 32.4–36.3)
MCV RBC AUTO: 92.1 FL (ref 80.5–98.2)
MONOCYTES # BLD AUTO: 0.58 10*3/MM3 (ref 0.2–1.2)
MONOCYTES NFR BLD AUTO: 5.3 % (ref 5–12)
NEUTROPHILS # BLD AUTO: 8.75 10*3/MM3 (ref 1.9–8.1)
NEUTROPHILS NFR BLD AUTO: 80.4 % (ref 42.7–76)
NT-PROBNP SERPL-MCNC: 539.5 PG/ML (ref 0–900)
PLATELET # BLD AUTO: 188 10*3/MM3 (ref 140–500)
PMV BLD AUTO: 10.4 FL (ref 6–12)
POTASSIUM BLD-SCNC: 3.4 MMOL/L (ref 3.5–5.2)
RBC # BLD AUTO: 4.06 10*6/MM3 (ref 3.9–5.2)
RHINOVIRUS RNA SPEC NAA+PROBE: NOT DETECTED
RSV RNA NPH QL NAA+NON-PROBE: NOT DETECTED
SODIUM BLD-SCNC: 141 MMOL/L (ref 136–145)
TROPONIN T SERPL-MCNC: <0.01 NG/ML (ref 0–0.03)
WBC NRBC COR # BLD: 10.88 10*3/MM3 (ref 4.5–10.7)
WHOLE BLOOD HOLD SPECIMEN: NORMAL
WHOLE BLOOD HOLD SPECIMEN: NORMAL

## 2019-01-09 PROCEDURE — 36415 COLL VENOUS BLD VENIPUNCTURE: CPT | Performed by: EMERGENCY MEDICINE

## 2019-01-09 PROCEDURE — 85025 COMPLETE CBC W/AUTO DIFF WBC: CPT | Performed by: EMERGENCY MEDICINE

## 2019-01-09 PROCEDURE — 93005 ELECTROCARDIOGRAM TRACING: CPT

## 2019-01-09 PROCEDURE — 94799 UNLISTED PULMONARY SVC/PX: CPT

## 2019-01-09 PROCEDURE — 36415 COLL VENOUS BLD VENIPUNCTURE: CPT

## 2019-01-09 PROCEDURE — 94640 AIRWAY INHALATION TREATMENT: CPT

## 2019-01-09 PROCEDURE — 93010 ELECTROCARDIOGRAM REPORT: CPT | Performed by: INTERNAL MEDICINE

## 2019-01-09 PROCEDURE — 80048 BASIC METABOLIC PNL TOTAL CA: CPT | Performed by: EMERGENCY MEDICINE

## 2019-01-09 PROCEDURE — 84484 ASSAY OF TROPONIN QUANT: CPT | Performed by: EMERGENCY MEDICINE

## 2019-01-09 PROCEDURE — 87581 M.PNEUMON DNA AMP PROBE: CPT | Performed by: EMERGENCY MEDICINE

## 2019-01-09 PROCEDURE — 87486 CHLMYD PNEUM DNA AMP PROBE: CPT | Performed by: EMERGENCY MEDICINE

## 2019-01-09 PROCEDURE — 93005 ELECTROCARDIOGRAM TRACING: CPT | Performed by: EMERGENCY MEDICINE

## 2019-01-09 PROCEDURE — 99285 EMERGENCY DEPT VISIT HI MDM: CPT

## 2019-01-09 PROCEDURE — 83880 ASSAY OF NATRIURETIC PEPTIDE: CPT | Performed by: EMERGENCY MEDICINE

## 2019-01-09 PROCEDURE — 87798 DETECT AGENT NOS DNA AMP: CPT | Performed by: EMERGENCY MEDICINE

## 2019-01-09 PROCEDURE — 87633 RESP VIRUS 12-25 TARGETS: CPT | Performed by: EMERGENCY MEDICINE

## 2019-01-09 PROCEDURE — 71046 X-RAY EXAM CHEST 2 VIEWS: CPT

## 2019-01-09 RX ORDER — SODIUM CHLORIDE 0.9 % (FLUSH) 0.9 %
10 SYRINGE (ML) INJECTION AS NEEDED
Status: DISCONTINUED | OUTPATIENT
Start: 2019-01-09 | End: 2019-01-09 | Stop reason: HOSPADM

## 2019-01-09 RX ORDER — PREDNISONE 50 MG/1
50 TABLET ORAL DAILY
Qty: 5 TABLET | Refills: 0 | Status: SHIPPED | OUTPATIENT
Start: 2019-01-09 | End: 2019-01-14

## 2019-01-09 RX ORDER — AZITHROMYCIN 250 MG/1
TABLET, FILM COATED ORAL
Qty: 6 TABLET | Refills: 0 | Status: SHIPPED | OUTPATIENT
Start: 2019-01-09 | End: 2019-01-14

## 2019-01-09 RX ORDER — ACETAMINOPHEN 500 MG
1000 TABLET ORAL ONCE
Status: COMPLETED | OUTPATIENT
Start: 2019-01-09 | End: 2019-01-09

## 2019-01-09 RX ORDER — IPRATROPIUM BROMIDE AND ALBUTEROL SULFATE 2.5; .5 MG/3ML; MG/3ML
3 SOLUTION RESPIRATORY (INHALATION) ONCE
Status: COMPLETED | OUTPATIENT
Start: 2019-01-09 | End: 2019-01-09

## 2019-01-09 RX ADMIN — IPRATROPIUM BROMIDE AND ALBUTEROL SULFATE 3 ML: 2.5; .5 SOLUTION RESPIRATORY (INHALATION) at 16:13

## 2019-01-09 RX ADMIN — ACETAMINOPHEN 1000 MG: 500 TABLET, FILM COATED ORAL at 14:48

## 2019-01-09 RX ADMIN — SODIUM CHLORIDE 500 ML: 9 INJECTION, SOLUTION INTRAVENOUS at 16:43

## 2019-01-09 NOTE — ED PROVIDER NOTES
EMERGENCY DEPARTMENT ENCOUNTER    Room Number:  21/21  Date seen:  1/9/2019  Time seen: 2:17 PM  PCP: Javan Martinez MD  Historian: Patient      HPI:  Chief Complaint: Cough  Context: Paz Browne is a 63 y.o. female with hx of emphysema and CHF who presents to the ED for evaluation of productive cough with yellow sputum x2 days. Reports associated pleuritic chest pain that is aggravated by deep inspiration and cough. Also reports chills. No other complaints at this time.       Pain Location: n/a  Radiation: n/a  Quality: n/a  Intensity/Severity: moderate  Duration: 2 days  Onset quality: gradual  Timing: constant  Progression: persistent   Aggravating Factors: none  Alleviating Factors: none  Previous Episodes: none reported  Treatment before arrival: none  Associated Symptoms: pleuritic chest pain, chills     PAST MEDICAL HISTORY  Active Ambulatory Problems     Diagnosis Date Noted   • PAF (paroxysmal atrial fibrillation) (CMS/[facility]) 01/25/2016   • Hypertension    • Hyperlipidemia    • Chronic obstructive pulmonary disease with acute exacerbation (CMS/[facility])    • Pain medication agreement 05/04/2016   • Hiatal hernia 05/04/2016   • Primary osteoarthritis involving multiple joints 05/04/2016   • Pulmonary hypertension (CMS/[facility])    • S/P TVR (tricuspid valve repair) 07/07/2016   • Long term current use of anticoagulant 10/13/2016   • Pulmonary nodule 10/13/2016   • Hemoptysis 10/14/2016   • RLS (restless legs syndrome) 11/18/2016   • Chronic low back pain 08/02/2017   • Dysplastic polyp of colon 08/07/2017   • Lower GI bleed 08/09/2017   • History of mitral valve replacement with tissue graft 08/11/2017   • Chronic respiratory failure with hypoxia (CMS/[facility]) 08/13/2017   • Anemia 08/09/2017   • Celiac artery stenosis (CMS/[facility]) 08/24/2017   • Acute on chronic diastolic congestive heart failure (CMS/[facility]) 08/25/2017   • Intertrigo 08/25/2017   • GI bleed 12/01/2017   • Pneumonia due to infectious organism 07/18/2018      Resolved Ambulatory Problems     Diagnosis Date Noted   • Tachycardia    • Renal failure    • Palpitations    • Mitral regurgitation    • Heart failure (CMS/[facility]) 06/08/2016   • Aspiration pneumonia (CMS/[facility]) 10/14/2016   • Precordial pain 08/11/2017   • Oral candidiasis 08/14/2017   • VAN (acute kidney injury) (CMS/[facility]) 08/15/2017   • Acute exacerbation of chronic obstructive pulmonary disease (COPD) (CMS/[facility]) 01/02/2018     Past Medical History:   Diagnosis Date   • Anemia    • Atrial fibrillation (CMS/[facility])    • Atrial flutter (CMS/[facility])    • Cataract    • Celiac artery stenosis (CMS/[facility])    • CHF (congestive heart failure) (CMS/[facility])    • Chronic respiratory failure with hypoxia (CMS/[facility])    • Colon polyp    • COPD (chronic obstructive pulmonary disease) (CMS/[facility])    • Fall    • GI bleed    • Hiatal hernia    • History of mitral valve replacement with tissue graft    • Hyperlipidemia    • Hypertension    • Intertrigo    • Long term (current) use of anticoagulants    • Mitral regurgitation    • PAF (paroxysmal atrial fibrillation) (CMS/[facility])    • Palpitations    • Pneumonia    • Pulmonary hypertension (CMS/[facility])    • Renal failure    • Shortness of breath    • Tachycardia    • Valvular heart disease          PAST SURGICAL HISTORY  Past Surgical History:   Procedure Laterality Date   • CARDIAC CATHETERIZATION  09/01/2014    Right dominant systemt, normal coronary arteries.    • CARDIAC CATHETERIZATION Left 6/10/2016    Procedure: Cardiac catheterization;  Surgeon: Sergei Hall MD;  Location: [facility] CATH INVASIVE LOCATION;  Service:    • CARDIAC CATHETERIZATION N/A 6/10/2016    Procedure: Right Heart Cath;  Surgeon: Sergei Hall MD;  Location: [facility] CATH INVASIVE LOCATION;  Service:    • CATARACT EXTRACTION     • COLONOSCOPY     • COLONOSCOPY N/A 8/4/2017    Procedure: COLONOSCOPY TO CECUM/TI WITH POLYPECTOMY ( COLD BX);  Surgeon: Cleveland Devine MD;  Location: [facility] ENDOSCOPY;  Service:    • COLONOSCOPY N/A 8/10/2017     Procedure: COLONOSCOPY to cecum and TI with 2 clips placed at transverse;  Surgeon: Earnest PALOMO MD;  Location: [facility] ENDOSCOPY;  Service:    • COLONOSCOPY N/A 2017    Procedure: COLONOSCOPY INTO CECUM WITH COLD POLYPECTOMIES;  Surgeon: Cleveland Devine MD;  Location: [facility] ENDOSCOPY;  Service:    • ENDOSCOPY N/A 2017    Procedure: ESOPHAGOGASTRODUODENOSCOPY;  Surgeon: Porsha Ruby MD;  Location: [facility] ENDOSCOPY;  Service:    • ENDOSCOPY N/A 2017    Procedure: ESOPHAGOGASTRODUODENOSCOPY WITH BIOPSIES;  Surgeon: Cleveland Devine MD;  Location: [facility] ENDOSCOPY;  Service:    • GALLBLADDER SURGERY     • HEMORRHOIDECTOMY     • HYSTERECTOMY     • KIDNEY SURGERY  2013   • MAZE PROCEDURE     • MITRAL VALVE REPLACEMENT     • TONSILLECTOMY     • TRICUSPID VALVE REPLACEMENT           FAMILY HISTORY  Family History   Adopted: Yes   Problem Relation Age of Onset   • No Known Problems Mother    • No Known Problems Father          SOCIAL HISTORY  Social History     Socioeconomic History   • Marital status:      Spouse name: Not on file   • Number of children: Not on file   • Years of education: Not on file   • Highest education level: Not on file   Social Needs   • Financial resource strain: Not on file   • Food insecurity - worry: Not on file   • Food insecurity - inability: Not on file   • Transportation needs - medical: Not on file   • Transportation needs - non-medical: Not on file   Occupational History   • Not on file   Tobacco Use   • Smoking status: Former Smoker     Last attempt to quit:      Years since quittin.0   • Smokeless tobacco: Never Used   Substance and Sexual Activity   • Alcohol use: No     Comment: caffiene use   • Drug use: No   • Sexual activity: Defer   Other Topics Concern   • Not on file   Social History Narrative   • Not on file         ALLERGIES  Bupropion; Cephalexin; and Metoprolol        REVIEW OF SYSTEMS  Review of Systems   Constitutional: Positive  for chills. Negative for fever.   HENT: Negative for sore throat.    Respiratory: Positive for cough. Negative for shortness of breath.    Cardiovascular: Positive for chest pain.   Gastrointestinal: Negative for abdominal pain.   Endocrine: Negative for polyuria.   Genitourinary: Negative for dysuria.   Musculoskeletal: Negative for neck pain.   Skin: Negative for rash.   Neurological: Negative for headaches.   All other systems reviewed and are negative.           PHYSICAL EXAM  ED Triage Vitals [01/09/19 1340]   Temp Heart Rate Resp BP SpO2   (!) 100.9 °F (38.3 °C) 116 24 (!) 199/84 (!) 80 %      Temp src Heart Rate Source Patient Position BP Location FiO2 (%)   Tympanic Monitor Sitting Right arm --         GENERAL: not distressed  HENT: nares patent  EYES: no scleral icterus  CV: regular rhythm, regular rate  RESPIRATORY: normal effort, faint expiratory wheeze, bibasilar rhonchi, speaks in full sentences   ABDOMEN: soft, nontender  MUSCULOSKELETAL: no deformity, no lower extremity edema or tenderness  NEURO: alert, BOYD, FC  SKIN: warm, dry    Vital signs and nursing notes reviewed.          LAB RESULTS  Recent Results (from the past 24 hour(s))   Light Blue Top    Collection Time: 01/09/19  2:02 PM   Result Value Ref Range    Extra Tube hold for add-on    Green Top (Gel)    Collection Time: 01/09/19  2:02 PM   Result Value Ref Range    Extra Tube Hold for add-ons.    Lavender Top    Collection Time: 01/09/19  2:02 PM   Result Value Ref Range    Extra Tube hold for add-on    Gold Top - SST    Collection Time: 01/09/19  2:02 PM   Result Value Ref Range    Extra Tube Hold for add-ons.    Basic Metabolic Panel    Collection Time: 01/09/19  2:02 PM   Result Value Ref Range    Glucose 77 65 - 99 mg/dL    BUN 14 8 - 23 mg/dL    Creatinine 1.31 (H) 0.57 - 1.00 mg/dL    Sodium 141 136 - 145 mmol/L    Potassium 3.4 (L) 3.5 - 5.2 mmol/L    Chloride 99 98 - 107 mmol/L    CO2 25.4 22.0 - 29.0 mmol/L    Calcium 9.7 8.6 - 10.5  mg/dL    eGFR Non African Amer 41 (L) >60 mL/min/1.73    BUN/Creatinine Ratio 10.7 7.0 - 25.0    Anion Gap 16.6 mmol/L   BNP    Collection Time: 01/09/19  2:02 PM   Result Value Ref Range    proBNP 539.5 0.0 - 900.0 pg/mL   Troponin    Collection Time: 01/09/19  2:02 PM   Result Value Ref Range    Troponin T <0.010 0.000 - 0.030 ng/mL   CBC Auto Differential    Collection Time: 01/09/19  2:02 PM   Result Value Ref Range    WBC 10.88 (H) 4.50 - 10.70 10*3/mm3    RBC 4.06 3.90 - 5.20 10*6/mm3    Hemoglobin 11.8 (L) 11.9 - 15.5 g/dL    Hematocrit 37.4 35.6 - 45.5 %    MCV 92.1 80.5 - 98.2 fL    MCH 29.1 26.9 - 32.0 pg    MCHC 31.6 (L) 32.4 - 36.3 g/dL    RDW 13.1 (H) 11.7 - 13.0 %    RDW-SD 43.9 37.0 - 54.0 fl    MPV 10.4 6.0 - 12.0 fL    Platelets 188 140 - 500 10*3/mm3    Neutrophil % 80.4 (H) 42.7 - 76.0 %    Lymphocyte % 12.8 (L) 19.6 - 45.3 %    Monocyte % 5.3 5.0 - 12.0 %    Eosinophil % 0.5 0.3 - 6.2 %    Basophil % 0.2 0.0 - 1.5 %    Immature Grans % 0.8 (H) 0.0 - 0.5 %    Neutrophils, Absolute 8.75 (H) 1.90 - 8.10 10*3/mm3    Lymphocytes, Absolute 1.39 0.90 - 4.80 10*3/mm3    Monocytes, Absolute 0.58 0.20 - 1.20 10*3/mm3    Eosinophils, Absolute 0.05 0.00 - 0.70 10*3/mm3    Basophils, Absolute 0.02 0.00 - 0.20 10*3/mm3    Immature Grans, Absolute 0.09 (H) 0.00 - 0.03 10*3/mm3   Respiratory Panel, PCR - Swab, Nasopharynx    Collection Time: 01/09/19  2:42 PM   Result Value Ref Range    ADENOVIRUS, PCR Not Detected Not Detected    Coronavirus 229E Not Detected Not Detected    Coronavirus HKU1 Not Detected Not Detected    Coronavirus NL63 Not Detected Not Detected    Coronavirus OC43 Detected (A) Not Detected    Human Metapneumovirus Not Detected Not Detected    Human Rhinovirus/Enterovirus Not Detected Not Detected    Influenza B PCR Not Detected Not Detected    Parainfluenza Virus 1 Not Detected Not Detected    Parainfluenza Virus 2 Not Detected Not Detected    Parainfluenza Virus 3 Not Detected Not Detected     Parainfluenza Virus 4 Not Detected Not Detected    Bordetella pertussis pcr Not Detected Not Detected    Influenza A H1 2009 PCR Not Detected Not Detected    Chlamydophila pneumoniae PCR Not Detected Not Detected    Mycoplasma pneumo by PCR Not Detected Not Detected    Influenza A PCR Not Detected Not Detected    Influenza A H3 Not Detected Not Detected    Influenza A H1 Not Detected Not Detected    RSV, PCR Not Detected Not Detected    Bordetella parapertussis PCR Not Detected Not Detected       Ordered the above labs and reviewed the results.        RADIOLOGY  XR Chest 2 View   Final Result   Advanced pulmonary emphysema with chronic interstitial   disease. Multiple calcified granulomas. There is no evidence for   interval change or convincing evidence for active disease in the chest.       This report was finalized on 1/9/2019 3:45 PM by Dr. Magdi Greer M.D.                 Ordered the above noted radiological studies. Reviewed by me in PACS.          PROCEDURES  Procedures        EKG:           EKG time: 1355  Rhythm/Rate: Sinus at 88  P waves and PA: normal  QRS, axis: low voltage   ST and T waves: normal     Interpreted Contemporaneously by me, independently viewed  No prior for comparison.               MEDICATIONS GIVEN IN ER  Medications   sodium chloride 0.9 % flush 10 mL (not administered)   sodium chloride 0.9 % bolus 500 mL (500 mL Intravenous New Bag 1/9/19 1643)   acetaminophen (TYLENOL) tablet 1,000 mg (1,000 mg Oral Given 1/9/19 1448)   ipratropium-albuterol (DUO-NEB) nebulizer solution 3 mL (3 mL Nebulization Given 1/9/19 1613)                   PROGRESS AND CONSULTS     1427: Patient and family informed of plan to obtain chest XR and labs.     1446: Tylenol ordered for slight fever and headache.     1633: Will give fluids and assess ambulatory pulse ox. If normal, liekly discharge home.     1639: O2 sat droppped to 88% on 3L while mbulating, states she uses 5L at baseline. O2 sat improved  after increasing to 5L.    1643: Updated, informed that positive for coronavirus. Discussed plans to discharge home. Patient is agreeable and all questions addressed at this time.       MEDICAL DECISION MAKING      MDM  Number of Diagnoses or Management Options  VAN (acute kidney injury) (CMS/[facility]):   COPD exacerbation (CMS/[facility]):   Coronavirus infection:   Diagnosis management comments: Pt given IVF. Instructed to f/u in 2 days for Cr recheck. Pt said she can do this with her PCP without issue.     Has coronavirus. Ambulated with acceptable O2 sats. Though known virus, she does have COPD with new productive cough. Therefore, per guidelines, will add abx for anti-infalmmatory effects of azithromycin.        Amount and/or Complexity of Data Reviewed  Clinical lab tests: reviewed and ordered (Mild VAN)  Tests in the radiology section of CPT®: ordered and reviewed (Advanced emphysema)  Tests in the medicine section of CPT®: ordered and reviewed (See EKG note)  Decide to obtain previous medical records or to obtain history from someone other than the patient: yes  Independent visualization of images, tracings, or specimens: yes (Advanced COPD)               DIAGNOSIS  Final diagnoses:   Coronavirus infection   COPD exacerbation (CMS/[facility])   VAN (acute kidney injury) (CMS/[facility])         DISPOSITION  DISCHARGE    Patient discharged in stable condition.    Reviewed implications of results, diagnosis, meds, responsibility to follow up, warning signs and symptoms of possible worsening, potential complications and reasons to return to ER.    Patient/Family voiced understanding of above instructions.    Discussed plan for discharge, as there is no emergent indication for admission. Patient referred to primary care provider for BP management due to today's BP. Pt/family is agreeable and understands need for follow up and repeat testing.  Pt is aware that discharge does not mean that nothing is wrong but it indicates no emergency is  present that requires admission and they must continue care with follow-up as given below or physician of their choice.     FOLLOW-UP  Javan Martinez MD  [address] [place], [place] [id number] [phone]    Schedule an appointment as soon as possible for a visit in 2 days  for blood work to recheck your kidney function         Medication List      New Prescriptions    azithromycin 250 MG tablet  Commonly known as:  ZITHROMAX  Take 2 tablets the first day, then 1 tablet daily for 4 days.     predniSONE 50 MG tablet  Commonly known as:  DELTASONE  Take 1 tablet by mouth Daily for 5 days.        Changed    furosemide 40 MG tablet  Commonly known as:  LASIX  Take 1 tablet by mouth Daily.  What changed:  how much to take     HYDROcodone-acetaminophen 5-325 MG per tablet  Commonly known as:  NORCO  Take 1 tablet by mouth Every 8 (Eight) Hours As Needed for Moderate Pain .   Chronic pain medicine for low back pain  What changed:    when to take this  additional instructions     * lisinopril 5 MG tablet  Commonly known as:  PRINIVIL,ZESTRIL  Take one Tablet by mouth Once a day  What changed:    how much to take  when to take this  additional instructions     * lisinopril 5 MG tablet  Commonly known as:  PRINIVIL,ZESTRIL  TAKE ONE TABLET BY MOUTH ONCE A DAY  What changed:    how much to take  how to take this  when to take this     potassium chloride 20 MEQ CR tablet  Commonly known as:  KLOR-CON  Take 1 tablet by mouth Daily.  What changed:  how much to take     temazepam 30 MG capsule  Commonly known as:  RESTORIL  TAKE 1 CAPSULE BY MOUTH EVERY NIGHT AT BEDTIME  What changed:    how much to take  how to take this  when to take this         * This list has 2 medication(s) that are the same as other medications   prescribed for you. Read the directions carefully, and ask your doctor or   other care provider to review them with you.                          Latest Documented Vital Signs:  As of 4:50 PM  BP- 132/50 HR-  74 Temp- (!) 100.8 °F (38.2 °C) (Oral) O2 sat- 100%        --  Documentation assistance provided by thanh Davis for Dr. Adrian MD.  Information recorded by the scribe was done at my direction and has been verified and validated by me.            Shaniqua Davis  01/09/19 0798       Sergei Campbell II, MD  01/10/19 [id number]

## 2019-01-09 NOTE — ED NOTES
Pt to ED with c/o SOA, cough with green thick sputum, worse x days. Pt has hx of COPD, wears 3L nc AAT at home. Chest tightness present. Trish Scanlon, RN  01/09/19 4410

## 2019-01-09 NOTE — ED NOTES
Pt arrives with c/o SOA and CP x3 days. Pt also c/o generalized body aches and flu like symptoms.      Hannah Arriaza, RN  01/09/19 0557

## 2019-01-11 RX ORDER — CARVEDILOL 6.25 MG/1
6.25 TABLET ORAL 2 TIMES DAILY WITH MEALS
Qty: 60 TABLET | Refills: 0 | Status: SHIPPED | OUTPATIENT
Start: 2019-01-11 | End: 2019-01-14 | Stop reason: SDUPTHER

## 2019-01-14 ENCOUNTER — TELEPHONE (OUTPATIENT)
Dept: FAMILY MEDICINE CLINIC | Facility: CLINIC | Age: 64
End: 2019-01-14

## 2019-01-14 ENCOUNTER — OFFICE VISIT (OUTPATIENT)
Dept: CARDIOLOGY | Facility: CLINIC | Age: 64
End: 2019-01-14

## 2019-01-14 VITALS
SYSTOLIC BLOOD PRESSURE: 140 MMHG | DIASTOLIC BLOOD PRESSURE: 50 MMHG | HEART RATE: 77 BPM | HEIGHT: 67 IN | WEIGHT: 139 LBS | BODY MASS INDEX: 21.82 KG/M2

## 2019-01-14 DIAGNOSIS — E78.49 OTHER HYPERLIPIDEMIA: ICD-10-CM

## 2019-01-14 DIAGNOSIS — I27.20 PULMONARY HYPERTENSION (HCC): ICD-10-CM

## 2019-01-14 DIAGNOSIS — I10 ESSENTIAL HYPERTENSION: ICD-10-CM

## 2019-01-14 DIAGNOSIS — Z98.890 S/P TVR (TRICUSPID VALVE REPAIR): ICD-10-CM

## 2019-01-14 DIAGNOSIS — Z95.2 S/P MVR (MITRAL VALVE REPLACEMENT): Primary | ICD-10-CM

## 2019-01-14 DIAGNOSIS — I48.0 PAROXYSMAL ATRIAL FIBRILLATION (HCC): ICD-10-CM

## 2019-01-14 PROCEDURE — 93000 ELECTROCARDIOGRAM COMPLETE: CPT | Performed by: NURSE PRACTITIONER

## 2019-01-14 PROCEDURE — 99214 OFFICE O/P EST MOD 30 MIN: CPT | Performed by: NURSE PRACTITIONER

## 2019-01-14 RX ORDER — LISINOPRIL 5 MG/1
TABLET ORAL
Qty: 90 TABLET | Refills: 3 | Status: SHIPPED | OUTPATIENT
Start: 2019-01-14 | End: 2020-03-16

## 2019-01-14 RX ORDER — HYDROCODONE BITARTRATE AND ACETAMINOPHEN 5; 325 MG/1; MG/1
1 TABLET ORAL EVERY 8 HOURS PRN
Qty: 90 TABLET | Refills: 0 | Status: SHIPPED | OUTPATIENT
Start: 2019-01-14 | End: 2019-02-13 | Stop reason: SDUPTHER

## 2019-01-14 RX ORDER — CARVEDILOL 6.25 MG/1
6.25 TABLET ORAL 2 TIMES DAILY WITH MEALS
Qty: 60 TABLET | Refills: 11 | Status: SHIPPED | OUTPATIENT
Start: 2019-01-14 | End: 2020-01-06

## 2019-01-14 NOTE — PROGRESS NOTES
Date of Office Visit: 2019  Encounter Provider: BA Charles  Place of Service: [facility] CARDIOLOGY  Patient Name: Paz Browne  :1955    Chief Complaint   Patient presents with   • Congestive Heart Failure   • Cardiac Valve Problem   • Atrial Fibrillation   :     HPI: Paz Browne is a 63 y.o. female is a patient of Dr. Gan. I am seeing her today for the first time and have reviewed her record.     Her past medical history is significant of paroxysmal atrial flutter/fibrillation, status post tricuspid valve repair, status post mitral valve replacement, COPD, hypertension, pulmonary hypertension, and GI bleed.    She was hospitalized with progressive shortness of breath in 2014.  At that time she was found to be in rapid atrial fibrillation with some congestive heart failure.  Echocardiogram revealed normal left ventricular systolic function with severely dilated left atrium.  The right ventricle was moderately enlarged.  She had severe mitral and tricuspid insufficiency with severe pulmonary hypertension.  She was diuresed and rate controlled. CHIO confirmed severe mitral and severe tricuspid regurgitation.  She had cardiac catheterization which showed no significant coronary artery disease with severe mitral insufficiency.  She underwent urgent mitral valve repair using a #26 annuloplasty ring which was unsuccessful and had to be replaced with a #31 mm porcine [employer] prostheses.  She also had tricuspid valve repair and a right and left CryoMaze procedure with closure of the atrial appendage.  She did well postoperatively.  She has some transient will insufficiency and some junctional arrhythmia.  She was later found to be bradycardic and then went back into atrial fibrillation and was started on warfarin.  In 2014 she presented with tachycardia and shortness of breath and was in atrial flutter.  Her carvedilol was increased she was  "later started on amiodarone.  She underwent electrocardioversion in October 2014.  In June 2016 she had progressive dyspnea and was admitted to the hospital.  Echocardiogram showed normal left ventricular systolic function and her mitral valve replacement and tricuspid valve repair appeared normal.  She had marked pulmonary hypertension and some diffuse T-wave inversion in the precordial leads.  ProBNP was mildly elevated.  Cardiac catheterization showed normal coronary arteries.  Right and left sided filling pressures were normal.  She had moderate pulmonary hypertension with normal wedge pressure.  She did not receive vasodilators and was later followed with pulmonary and initiated on oxygen therapy.  In August 2017 she had some GI bleeding.  She had a polypectomy also had some gastritis and hemorrhoids.  She was removed from warfarin and then kept off warfarin and she maintained sinus rhythm.  She was admitted in January 2018 with some right-sided pneumonia and viral infection and treated for that.    Patient presents today for reassessment.  She was advised to come in if she needs more refills on her carvedilol and lisinopril.  She overall is doing \"good\".  She remains on 3 L of oxygen and walks with 4 L of oxygen.  She continues to follow with pulmonary.  She was in the emergency department last week and was diagnosed with chronic virus and is being treated for that.  She has some chronic fatigue which is unchanged.  She denies chest pain tightness pressure, palpitation, edema, near syncope, or syncope.  Her diuretics have resolved history of lower extremity edema.  She does not perform any structured exercise but climbs steps at home as much as she can.      Allergies   Allergen Reactions   • Bupropion Itching   • Cephalexin Itching     Tolerated piperacillin/tazobactam   • Metoprolol Itching       Past Medical History:   Diagnosis Date   • VAN (acute kidney injury) (CMS/HCC)    • Anemia    • Atrial " fibrillation (CMS/HCC)     Fani procedure   • Atrial flutter (CMS/HCC)     cardioversion   • Cataract    • Celiac artery stenosis (CMS/HCC)    • CHF (congestive heart failure) (CMS/HCC)    • Chronic respiratory failure with hypoxia (CMS/HCC)    • Colon polyp    • COPD (chronic obstructive pulmonary disease) (CMS/HCC)    • Fall    • GI bleed    • Hiatal hernia    • History of mitral valve replacement with tissue graft    • Hyperlipidemia    • Hypertension    • Intertrigo    • Long term (current) use of anticoagulants    • Mitral regurgitation    • PAF (paroxysmal atrial fibrillation) (CMS/[facility])    • Palpitations    • Pneumonia    • Pulmonary hypertension (CMS/HCC)    • Renal failure    • Shortness of breath    • Tachycardia    • Valvular heart disease        Past Surgical History:   Procedure Laterality Date   • CARDIAC CATHETERIZATION  09/01/2014    Right dominant systemt, normal coronary arteries.    • CARDIAC CATHETERIZATION Left 6/10/2016    Procedure: Cardiac catheterization;  Surgeon: Sergei Hall MD;  Location: [facility] CATH INVASIVE LOCATION;  Service:    • CARDIAC CATHETERIZATION N/A 6/10/2016    Procedure: Right Heart Cath;  Surgeon: Sergei Hall MD;  Location: [facility] CATH INVASIVE LOCATION;  Service:    • CATARACT EXTRACTION     • COLONOSCOPY     • COLONOSCOPY N/A 8/4/2017    Procedure: COLONOSCOPY TO CECUM/TI WITH POLYPECTOMY ( COLD BX);  Surgeon: Cleveland Devine MD;  Location: [facility] ENDOSCOPY;  Service:    • COLONOSCOPY N/A 8/10/2017    Procedure: COLONOSCOPY to cecum and TI with 2 clips placed at transverse;  Surgeon: Earnest PALOMO MD;  Location: [facility] ENDOSCOPY;  Service:    • COLONOSCOPY N/A 12/22/2017    Procedure: COLONOSCOPY INTO CECUM WITH COLD POLYPECTOMIES;  Surgeon: Cleveland Devine MD;  Location: [facility] ENDOSCOPY;  Service:    • ENDOSCOPY N/A 8/17/2017    Procedure: ESOPHAGOGASTRODUODENOSCOPY;  Surgeon: Porsha Ruby MD;  Location: [facility] ENDOSCOPY;  Service:    • ENDOSCOPY N/A 12/22/2017     "Procedure: ESOPHAGOGASTRODUODENOSCOPY WITH BIOPSIES;  Surgeon: Cleveland Devine MD;  Location: [facility] ENDOSCOPY;  Service:    • GALLBLADDER SURGERY     • HEMORRHOIDECTOMY     • HYSTERECTOMY     • KIDNEY SURGERY  04/22/2013   • MAZE PROCEDURE     • MITRAL VALVE REPLACEMENT     • TONSILLECTOMY     • TRICUSPID VALVE REPLACEMENT         Family and social history reviewed.     Review of Systems   Constitution: Positive for malaise/fatigue.   Respiratory: Positive for shortness of breath.      All other systems were reviewed and are negative        Objective:     Vitals:    01/14/19 1454   BP: 140/50   BP Location: Left arm   Patient Position: Sitting   Pulse: 77   Weight: 63 kg (139 lb)   Height: 170.2 cm (67\")     Body mass index is 21.77 kg/m².    PHYSICAL EXAM:  Physical Exam   Constitutional: She is oriented to person, place, and time. She appears well-developed and well-nourished. No distress.   HENT:   Head: Normocephalic.   Eyes: Conjunctivae are normal.   Neck: Normal range of motion. No JVD present.   Cardiovascular: Normal rate, regular rhythm, normal heart sounds and intact distal pulses.   No murmur heard.  Pulses:       Carotid pulses are 2+ on the right side with bruit, and 2+ on the left side with bruit.       Radial pulses are 2+ on the right side, and 2+ on the left side.        Posterior tibial pulses are 1+ on the right side, and 1+ on the left side.   Pulmonary/Chest: Effort normal. No respiratory distress. She has decreased breath sounds in the right lower field and the left middle field. She has wheezes in the left upper field and the left lower field. She has no rhonchi. She has no rales. She exhibits no tenderness.   Abdominal: Soft. Bowel sounds are normal. She exhibits no distension.   Musculoskeletal: Normal range of motion. She exhibits no edema.   Neurological: She is alert and oriented to person, place, and time.   Skin: Skin is warm, dry and intact. No rash noted. She is not diaphoretic. No " cyanosis.   Psychiatric: She has a normal mood and affect. Her behavior is normal. Judgment and thought content normal.         ECG 12 Lead  Date/Time: 1/14/2019 3:44 PM  Performed by: Melanie Sykes APRN  Authorized by: Melanie Sykes APRN   Comparison: compared with previous ECG from 3/25/2018  Similar to previous ECG  Rhythm: sinus rhythm  Rate: normal  T depression: V2  QRS axis: normal  Clinical impression: abnormal ECG            Current Outpatient Medications   Medication Sig Dispense Refill   • furosemide (LASIX) 40 MG tablet Take 1 tablet by mouth Daily. (Patient taking differently: TAKE ONE TABLET BY MOUTH EVERY MORNING AND 1/2 TABLET EVERY EVENING) 90 tablet 1   • albuterol (PROVENTIL HFA;VENTOLIN HFA) 108 (90 Base) MCG/ACT inhaler Inhale 2 puffs Every 4 (Four) Hours As Needed for Wheezing. 6.7 g 11   • albuterol (PROVENTIL) (2.5 MG/3ML) 0.083% nebulizer solution USE 1 VIAL BY NEBULIZATION EVERY 4 HOURS AS NEEDED FOR WHEEZING 150 mL 2   • aspirin 81 MG EC tablet Take 1 tablet by mouth Daily.     • atorvastatin (LIPITOR) 40 MG tablet TAKE 1 TABLET BY MOUTH DAILY. 90 tablet 2   • bacitracin 500 UNIT/GM ointment Apply  topically 3 (Three) Times a Day. 1 tube 0   • carvedilol (COREG) 6.25 MG tablet Take 1 tablet by mouth 2 (Two) Times a Day With Meals. 60 tablet 11   • cyclobenzaprine (FLEXERIL) 10 MG tablet Take 10 mg by mouth Every Night.     • ferrous sulfate 325 (65 FE) MG tablet TAKE 1 TABLET BY MOUTH 2 (TWO) TIMES A DAY WITH MEALS. 180 tablet 1   • fluticasone-salmeterol (ADVAIR DISKUS) 250-50 MCG/DOSE DISKUS Inhale 1 puff 2 (Two) Times a Day. 3 each 3   • HYDROcodone-acetaminophen (NORCO) 5-325 MG per tablet Take 1 tablet by mouth Every 8 (Eight) Hours As Needed for Moderate Pain . Chronic pain medicine for low back pain (Patient taking differently: Take 1 tablet by mouth Every 4 (Four) Hours As Needed for Moderate Pain . Chronic pain medicine for low back pain) 90 tablet 0   • lisinopril  (PRINIVIL,ZESTRIL) 5 MG tablet Take one Tablet by mouth Once a day 90 tablet 3   • loratadine (CLARITIN) 10 MG tablet Take 10 mg by mouth 2 (Two) Times a Day.     • meclizine (ANTIVERT) 25 MG tablet Take 25 mg by mouth 3 (Three) Times a Day As Needed for dizziness. prn     • Multiple Vitamins-Minerals (MULTIVITAL) tablet Take 1 tablet by mouth Daily.     • omeprazole (priLOSEC) 40 MG capsule TAKE 1 CAPSULE BY MOUTH DAILY. 90 capsule 1   • ondansetron (ZOFRAN) 4 MG tablet Take 4 mg by mouth Every 12 (Twelve) Hours As Needed for Nausea or Vomiting.     • polyethylene glycol (MIRALAX) packet Take 17 g by mouth 2 (Two) Times a Day.     • potassium chloride (KLOR-CON) 20 MEQ CR tablet Take 1 tablet by mouth Daily. (Patient taking differently: Take 10 mEq by mouth Daily.) 90 tablet 1   • predniSONE (DELTASONE) 50 MG tablet Take 1 tablet by mouth Daily for 5 days. 5 tablet 0   • roflumilast (DALIRESP) 500 MCG tablet tablet Take  by mouth Daily.     • rOPINIRole (REQUIP) 2 MG tablet TAKE 1 TABLET BY MOUTH EVERY NIGHT. 90 tablet 2   • sertraline (ZOLOFT) 100 MG tablet TAKE 1 TABLET BY MOUTH EVERY DAY 90 tablet 1   • sucralfate (CARAFATE) 1 g tablet TAKE 1 TABLET BY MOUTH BEFORE MEALS & AT BEDTIME 120 tablet 2   • temazepam (RESTORIL) 30 MG capsule TAKE 1 CAPSULE BY MOUTH EVERY NIGHT AT BEDTIME (Patient taking differently: TAKE 1/2 CAPSULE BY MOUTH EVERY NIGHT AT BEDTIME) 30 capsule 0   • tiotropium (SPIRIVA HANDIHALER) 18 MCG per inhalation capsule Place 1 capsule into inhaler and inhale Daily. 90 capsule 3     No current facility-administered medications for this visit.      Assessment:       Diagnosis Plan   1. S/P MVR (mitral valve replacement)     2. S/P TVR (tricuspid valve repair)     3. Paroxysmal atrial fibrillation (CMS/HCC)     4. Essential hypertension     5. Other hyperlipidemia     6. Pulmonary hypertension (CMS/HCC)          Orders Placed This Encounter   Procedures   • ECG 12 Lead     This order was created  via procedure documentation         Plan:         1.  History of severe mitral insufficiency status post mitral valve replacement with a tissue valve and tricuspid valve repair August 2014.  She has normal left ventricular systolic function last echocardiogram June 2016  2.  Hypertension continue same  3. History of paroxysmal atrial fibrillation status post CRYO MAZE 08/2014 recurrent atrial flutter/fibrillation status post cardioversion October 2014.  She was on warfarin and then maintained normal sinus rhythm  4.COPD with chronic hypoxemia on oxygen therapy follows with Dr. Melo  5. Recent coronavirus infection at ED 1/9/2018 treated with antibiotic and steroid. She is to follow up with PCP  6.  Hyperlipidemia on target dose atorvastatin continue the same  7.  Pulmonary hypertension she follows with pulmonology  8.  History of transient renal insufficiency Cr 1.31 BUN 14 on 1/9/2019  9. History of GI bleeding  10. History of Vertigo      Follow up in 6 months        It has been a pleasure to participate in this patient's care.      Thank you,  BA Charles      **Charles Disclaimer:**  Much of this encounter note is an electronic transcription/translation of spoken language to printed text. The electronic translation of spoken language may permit erroneous, or at times, nonsensical words or phrases to be inadvertently transcribed. Although I have reviewed the note for such errors, some may still exist.

## 2019-01-16 ENCOUNTER — TELEPHONE (OUTPATIENT)
Dept: FAMILY MEDICINE CLINIC | Facility: CLINIC | Age: 64
End: 2019-01-16

## 2019-01-16 RX ORDER — TEMAZEPAM 30 MG/1
CAPSULE ORAL
Qty: 30 CAPSULE | Refills: 0 | Status: SHIPPED | OUTPATIENT
Start: 2019-01-16 | End: 2019-01-31

## 2019-01-22 ENCOUNTER — TELEPHONE (OUTPATIENT)
Dept: FAMILY MEDICINE CLINIC | Facility: CLINIC | Age: 64
End: 2019-01-22

## 2019-01-22 RX ORDER — SUCRALFATE 1 G/1
TABLET ORAL
Qty: 120 TABLET | Refills: 2 | OUTPATIENT
Start: 2019-01-22

## 2019-01-22 NOTE — TELEPHONE ENCOUNTER
Is having a hard time getting her sleeping medication, and [facility] is saying they aren't in? Wants to know if we can change it to something else, that may in stock?

## 2019-01-23 ENCOUNTER — TELEPHONE (OUTPATIENT)
Dept: FAMILY MEDICINE CLINIC | Facility: CLINIC | Age: 64
End: 2019-01-23

## 2019-01-23 NOTE — TELEPHONE ENCOUNTER
Also called pharm. Re: change to 15 2-qhs or get from another pharmacy or are they not getting at all l/m on v/m tcb

## 2019-01-23 NOTE — TELEPHONE ENCOUNTER
Pt is on temazepam and she is having a hard time getting it every month because it is always on backorder. Wants to know if she can try something else. She took her last pill last night

## 2019-01-29 ENCOUNTER — TELEPHONE (OUTPATIENT)
Dept: FAMILY MEDICINE CLINIC | Facility: CLINIC | Age: 64
End: 2019-01-29

## 2019-01-29 NOTE — TELEPHONE ENCOUNTER
PT CALLED SAYING THAT SHE IS HAVING THE SAME PROBLEM WITH [facility] AS SHE DID WITH HER PREVIOUS PHARMACY WITH THE [facility]. IS THERE SOMETHING DIFFERENT RLS CAN PRESCRIBE, OR WHAT CAN BE DONE SO SHE ISN'T HAVING THE SAME PROBLEM EVERY MONTH?

## 2019-01-31 ENCOUNTER — TELEPHONE (OUTPATIENT)
Dept: FAMILY MEDICINE CLINIC | Facility: CLINIC | Age: 64
End: 2019-01-31

## 2019-01-31 RX ORDER — ZOLPIDEM TARTRATE 5 MG/1
5 TABLET ORAL NIGHTLY PRN
Qty: 30 TABLET | Refills: 1 | Status: ON HOLD | OUTPATIENT
Start: 2019-01-31 | End: 2019-06-30

## 2019-01-31 NOTE — TELEPHONE ENCOUNTER
----- Message from Javan Martinez MD sent at 1/31/2019 11:57 AM EST -----  Patient given Ambien if it does not work we'll have to see a psychiatrist  ----- Message -----  From: Mary Ellen Sarmiento MA  Sent: 1/31/2019  11:23 AM  To: Javan Martinez MD    Pt states tried otc for sleeping none works wants you to prescibe something else you want to try silenor or ambien 5mg.? She has history of temazepam/resteril/benedryle/rozarem/melatonin none worked. Send local and call patient with what you decided!

## 2019-02-01 ENCOUNTER — TELEPHONE (OUTPATIENT)
Dept: FAMILY MEDICINE CLINIC | Facility: CLINIC | Age: 64
End: 2019-02-01

## 2019-02-01 PROBLEM — G47.01 INSOMNIA DUE TO MEDICAL CONDITION: Chronic | Status: ACTIVE | Noted: 2019-02-01

## 2019-02-01 RX ORDER — SUCRALFATE 1 G/1
TABLET ORAL
Qty: 120 TABLET | Refills: 2 | OUTPATIENT
Start: 2019-02-01

## 2019-02-08 RX ORDER — SUCRALFATE 1 G/1
TABLET ORAL
Qty: 120 TABLET | Refills: 2 | OUTPATIENT
Start: 2019-02-08

## 2019-02-11 RX ORDER — SUCRALFATE 1 G/1
TABLET ORAL
Qty: 120 TABLET | Refills: 2 | OUTPATIENT
Start: 2019-02-11

## 2019-02-11 RX ORDER — ALBUTEROL SULFATE 2.5 MG/3ML
SOLUTION RESPIRATORY (INHALATION)
Qty: 150 ML | Refills: 2 | Status: SHIPPED | OUTPATIENT
Start: 2019-02-11 | End: 2019-04-29 | Stop reason: SDUPTHER

## 2019-02-11 RX ORDER — CARVEDILOL 6.25 MG/1
TABLET ORAL
Qty: 60 TABLET | Refills: 5 | Status: SHIPPED | OUTPATIENT
Start: 2019-02-11 | End: 2019-06-04 | Stop reason: SDUPTHER

## 2019-02-13 ENCOUNTER — TELEPHONE (OUTPATIENT)
Dept: FAMILY MEDICINE CLINIC | Facility: CLINIC | Age: 64
End: 2019-02-13

## 2019-02-13 RX ORDER — HYDROCODONE BITARTRATE AND ACETAMINOPHEN 5; 325 MG/1; MG/1
1 TABLET ORAL EVERY 8 HOURS PRN
Qty: 90 TABLET | Refills: 0 | Status: SHIPPED | OUTPATIENT
Start: 2019-02-13 | End: 2019-03-13 | Stop reason: SDUPTHER

## 2019-02-26 ENCOUNTER — TELEPHONE (OUTPATIENT)
Dept: FAMILY MEDICINE CLINIC | Facility: CLINIC | Age: 64
End: 2019-02-26

## 2019-02-27 NOTE — TELEPHONE ENCOUNTER
Left message she needs to contact her Oxygen company and they would be able to get her one the O[employer] company will send us info if they needed it.

## 2019-03-11 RX ORDER — FUROSEMIDE 20 MG/1
TABLET ORAL
Qty: 90 TABLET | Refills: 1 | Status: SHIPPED | OUTPATIENT
Start: 2019-03-11 | End: 2019-07-10 | Stop reason: SDUPTHER

## 2019-03-13 ENCOUNTER — TELEPHONE (OUTPATIENT)
Dept: FAMILY MEDICINE CLINIC | Facility: CLINIC | Age: 64
End: 2019-03-13

## 2019-03-13 RX ORDER — HYDROCODONE BITARTRATE AND ACETAMINOPHEN 5; 325 MG/1; MG/1
1 TABLET ORAL EVERY 8 HOURS PRN
Qty: 90 TABLET | Refills: 0 | Status: SHIPPED | OUTPATIENT
Start: 2019-03-13 | End: 2019-04-18 | Stop reason: SDUPTHER

## 2019-03-18 RX ORDER — SERTRALINE HYDROCHLORIDE 100 MG/1
TABLET, FILM COATED ORAL
Qty: 90 TABLET | Refills: 1 | Status: SHIPPED | OUTPATIENT
Start: 2019-03-18 | End: 2019-09-06 | Stop reason: SDUPTHER

## 2019-04-05 RX ORDER — FERROUS SULFATE 325(65) MG
TABLET ORAL
Qty: 180 TABLET | Refills: 1 | Status: SHIPPED | OUTPATIENT
Start: 2019-04-05 | End: 2019-10-05 | Stop reason: SDUPTHER

## 2019-04-16 ENCOUNTER — TELEPHONE (OUTPATIENT)
Dept: FAMILY MEDICINE CLINIC | Facility: CLINIC | Age: 64
End: 2019-04-16

## 2019-04-16 NOTE — TELEPHONE ENCOUNTER
PATIENT  CAME IN SHE NEEDS A REFILL ON HER HYDROCODONE PLEASE CALL THAT INTO [facility]...PLEASE CALL HIM IF THERE IS A PROBLEM..[phone]

## 2019-04-18 ENCOUNTER — TELEPHONE (OUTPATIENT)
Dept: FAMILY MEDICINE CLINIC | Facility: CLINIC | Age: 64
End: 2019-04-18

## 2019-04-18 RX ORDER — HYDROCODONE BITARTRATE AND ACETAMINOPHEN 5; 325 MG/1; MG/1
1 TABLET ORAL EVERY 8 HOURS PRN
Qty: 90 TABLET | Refills: 0 | Status: SHIPPED | OUTPATIENT
Start: 2019-04-18 | End: 2019-05-15 | Stop reason: SDUPTHER

## 2019-04-22 RX ORDER — SUCRALFATE 1 G/1
TABLET ORAL
Qty: 120 TABLET | Refills: 2 | OUTPATIENT
Start: 2019-04-22

## 2019-04-25 ENCOUNTER — APPOINTMENT (OUTPATIENT)
Dept: GENERAL RADIOLOGY | Facility: HOSPITAL | Age: 64
End: 2019-04-25

## 2019-04-25 ENCOUNTER — HOSPITAL ENCOUNTER (EMERGENCY)
Facility: HOSPITAL | Age: 64
Discharge: HOME | End: 2019-04-25
Attending: EMERGENCY MEDICINE | Admitting: EMERGENCY MEDICINE

## 2019-04-25 VITALS
WEIGHT: 140 LBS | RESPIRATION RATE: 18 BRPM | HEART RATE: 73 BPM | SYSTOLIC BLOOD PRESSURE: 136 MMHG | OXYGEN SATURATION: 100 % | BODY MASS INDEX: 21.97 KG/M2 | TEMPERATURE: 98.9 F | HEIGHT: 67 IN | DIASTOLIC BLOOD PRESSURE: 72 MMHG

## 2019-04-25 DIAGNOSIS — J44.1 COPD EXACERBATION (HCC): Primary | ICD-10-CM

## 2019-04-25 LAB
ALBUMIN SERPL-MCNC: 4.1 G/DL (ref 3.5–5.2)
ALBUMIN/GLOB SERPL: 1.7 G/DL
ALP SERPL-CCNC: 91 U/L (ref 39–117)
ALT SERPL W P-5'-P-CCNC: 14 U/L (ref 1–33)
ANION GAP SERPL CALCULATED.3IONS-SCNC: 11.1 MMOL/L
ARTERIAL PATENCY WRIST A: POSITIVE
AST SERPL-CCNC: 17 U/L (ref 1–32)
ATMOSPHERIC PRESS: 745 MMHG
B PARAPERT DNA SPEC QL NAA+PROBE: NOT DETECTED
B PERT DNA SPEC QL NAA+PROBE: NOT DETECTED
BASE EXCESS BLDA CALC-SCNC: 6.3 MMOL/L (ref 0–2)
BASOPHILS # BLD AUTO: 0.02 10*3/MM3 (ref 0–0.2)
BASOPHILS NFR BLD AUTO: 0.3 % (ref 0–1.5)
BDY SITE: ABNORMAL
BILIRUB SERPL-MCNC: 0.4 MG/DL (ref 0.2–1.2)
BUN BLD-MCNC: 7 MG/DL (ref 8–23)
BUN/CREAT SERPL: 8.5 (ref 7–25)
C PNEUM DNA NPH QL NAA+NON-PROBE: NOT DETECTED
CALCIUM SPEC-SCNC: 9.9 MG/DL (ref 8.6–10.5)
CHLORIDE SERPL-SCNC: 103 MMOL/L (ref 98–107)
CO2 SERPL-SCNC: 29.9 MMOL/L (ref 22–29)
CREAT BLD-MCNC: 0.82 MG/DL (ref 0.57–1)
D-LACTATE SERPL-SCNC: 1.7 MMOL/L (ref 0.5–2)
DEPRECATED RDW RBC AUTO: 40.3 FL (ref 37–54)
EOSINOPHIL # BLD AUTO: 0.15 10*3/MM3 (ref 0–0.4)
EOSINOPHIL NFR BLD AUTO: 2.1 % (ref 0.3–6.2)
ERYTHROCYTE [DISTWIDTH] IN BLOOD BY AUTOMATED COUNT: 12 % (ref 12.3–15.4)
FLUAV H1 2009 PAND RNA NPH QL NAA+PROBE: NOT DETECTED
FLUAV H1 HA GENE NPH QL NAA+PROBE: NOT DETECTED
FLUAV H3 RNA NPH QL NAA+PROBE: NOT DETECTED
FLUAV SUBTYP SPEC NAA+PROBE: NOT DETECTED
FLUBV RNA ISLT QL NAA+PROBE: NOT DETECTED
GAS FLOW AIRWAY: 3 LPM
GFR SERPL CREATININE-BSD FRML MDRD: 70 ML/MIN/1.73
GLOBULIN UR ELPH-MCNC: 2.4 GM/DL
GLUCOSE BLD-MCNC: 111 MG/DL (ref 65–99)
HADV DNA SPEC NAA+PROBE: NOT DETECTED
HCO3 BLDA-SCNC: 28.7 MMOL/L (ref 22–28)
HCOV 229E RNA SPEC QL NAA+PROBE: NOT DETECTED
HCOV HKU1 RNA SPEC QL NAA+PROBE: NOT DETECTED
HCOV NL63 RNA SPEC QL NAA+PROBE: NOT DETECTED
HCOV OC43 RNA SPEC QL NAA+PROBE: NOT DETECTED
HCT VFR BLD AUTO: 36 % (ref 34–46.6)
HGB BLD-MCNC: 11.4 G/DL (ref 12–15.9)
HMPV RNA NPH QL NAA+NON-PROBE: NOT DETECTED
HPIV1 RNA SPEC QL NAA+PROBE: NOT DETECTED
HPIV2 RNA SPEC QL NAA+PROBE: NOT DETECTED
HPIV3 RNA NPH QL NAA+PROBE: NOT DETECTED
HPIV4 P GENE NPH QL NAA+PROBE: NOT DETECTED
IMM GRANULOCYTES # BLD AUTO: 0 10*3/MM3 (ref 0–0.05)
IMM GRANULOCYTES NFR BLD AUTO: 0 % (ref 0–0.5)
INR PPP: 1.02 (ref 0.9–1.1)
LYMPHOCYTES # BLD AUTO: 1.18 10*3/MM3 (ref 0.7–3.1)
LYMPHOCYTES NFR BLD AUTO: 16.7 % (ref 19.6–45.3)
M PNEUMO IGG SER IA-ACNC: NOT DETECTED
MCH RBC QN AUTO: 29.5 PG (ref 26.6–33)
MCHC RBC AUTO-ENTMCNC: 31.7 G/DL (ref 31.5–35.7)
MCV RBC AUTO: 93 FL (ref 79–97)
MODALITY: ABNORMAL
MONOCYTES # BLD AUTO: 0.29 10*3/MM3 (ref 0.1–0.9)
MONOCYTES NFR BLD AUTO: 4.1 % (ref 5–12)
NEUTROPHILS # BLD AUTO: 5.44 10*3/MM3 (ref 1.7–7)
NEUTROPHILS NFR BLD AUTO: 76.8 % (ref 42.7–76)
NT-PROBNP SERPL-MCNC: 877.4 PG/ML (ref 5–900)
PCO2 BLDA: 31.9 MM HG (ref 35–45)
PH BLDA: 7.56 PH UNITS (ref 7.35–7.45)
PLATELET # BLD AUTO: 180 10*3/MM3 (ref 140–450)
PMV BLD AUTO: 10 FL (ref 6–12)
PO2 BLDA: 112.8 MM HG (ref 80–100)
POTASSIUM BLD-SCNC: 3.1 MMOL/L (ref 3.5–5.2)
PROCALCITONIN SERPL-MCNC: 0.03 NG/ML (ref 0.1–0.25)
PROT SERPL-MCNC: 6.5 G/DL (ref 6–8.5)
PROTHROMBIN TIME: 13.1 SECONDS (ref 11.7–14.2)
RBC # BLD AUTO: 3.87 10*6/MM3 (ref 3.77–5.28)
RHINOVIRUS RNA SPEC NAA+PROBE: NOT DETECTED
RSV RNA NPH QL NAA+NON-PROBE: NOT DETECTED
SAO2 % BLDCOA: 99 % (ref 92–99)
SODIUM BLD-SCNC: 144 MMOL/L (ref 136–145)
TOTAL RATE: 20 BREATHS/MINUTE
TROPONIN T SERPL-MCNC: <0.01 NG/ML (ref 0–0.03)
WBC NRBC COR # BLD: 7.08 10*3/MM3 (ref 3.4–10.8)

## 2019-04-25 PROCEDURE — 94640 AIRWAY INHALATION TREATMENT: CPT

## 2019-04-25 PROCEDURE — 83605 ASSAY OF LACTIC ACID: CPT | Performed by: EMERGENCY MEDICINE

## 2019-04-25 PROCEDURE — 84145 PROCALCITONIN (PCT): CPT | Performed by: EMERGENCY MEDICINE

## 2019-04-25 PROCEDURE — 71046 X-RAY EXAM CHEST 2 VIEWS: CPT

## 2019-04-25 PROCEDURE — 80053 COMPREHEN METABOLIC PANEL: CPT | Performed by: EMERGENCY MEDICINE

## 2019-04-25 PROCEDURE — 96374 THER/PROPH/DIAG INJ IV PUSH: CPT

## 2019-04-25 PROCEDURE — 87581 M.PNEUMON DNA AMP PROBE: CPT | Performed by: EMERGENCY MEDICINE

## 2019-04-25 PROCEDURE — 82803 BLOOD GASES ANY COMBINATION: CPT

## 2019-04-25 PROCEDURE — 84484 ASSAY OF TROPONIN QUANT: CPT | Performed by: EMERGENCY MEDICINE

## 2019-04-25 PROCEDURE — 94799 UNLISTED PULMONARY SVC/PX: CPT

## 2019-04-25 PROCEDURE — 87798 DETECT AGENT NOS DNA AMP: CPT | Performed by: EMERGENCY MEDICINE

## 2019-04-25 PROCEDURE — 99283 EMERGENCY DEPT VISIT LOW MDM: CPT

## 2019-04-25 PROCEDURE — 83880 ASSAY OF NATRIURETIC PEPTIDE: CPT | Performed by: EMERGENCY MEDICINE

## 2019-04-25 PROCEDURE — 87633 RESP VIRUS 12-25 TARGETS: CPT | Performed by: EMERGENCY MEDICINE

## 2019-04-25 PROCEDURE — 36600 WITHDRAWAL OF ARTERIAL BLOOD: CPT

## 2019-04-25 PROCEDURE — 25010000002 METHYLPREDNISOLONE PER 125 MG: Performed by: EMERGENCY MEDICINE

## 2019-04-25 PROCEDURE — 93010 ELECTROCARDIOGRAM REPORT: CPT | Performed by: INTERNAL MEDICINE

## 2019-04-25 PROCEDURE — 85610 PROTHROMBIN TIME: CPT | Performed by: EMERGENCY MEDICINE

## 2019-04-25 PROCEDURE — 87486 CHLMYD PNEUM DNA AMP PROBE: CPT | Performed by: EMERGENCY MEDICINE

## 2019-04-25 PROCEDURE — 85025 COMPLETE CBC W/AUTO DIFF WBC: CPT | Performed by: EMERGENCY MEDICINE

## 2019-04-25 PROCEDURE — 93005 ELECTROCARDIOGRAM TRACING: CPT | Performed by: EMERGENCY MEDICINE

## 2019-04-25 RX ORDER — METHYLPREDNISOLONE 4 MG/1
TABLET ORAL
Qty: 21 EACH | Refills: 0 | Status: ON HOLD | OUTPATIENT
Start: 2019-04-25 | End: 2019-06-30

## 2019-04-25 RX ORDER — METHYLPREDNISOLONE SODIUM SUCCINATE 125 MG/2ML
125 INJECTION, POWDER, LYOPHILIZED, FOR SOLUTION INTRAMUSCULAR; INTRAVENOUS ONCE
Status: COMPLETED | OUTPATIENT
Start: 2019-04-25 | End: 2019-04-25

## 2019-04-25 RX ORDER — IPRATROPIUM BROMIDE AND ALBUTEROL SULFATE 2.5; .5 MG/3ML; MG/3ML
3 SOLUTION RESPIRATORY (INHALATION) ONCE
Status: COMPLETED | OUTPATIENT
Start: 2019-04-25 | End: 2019-04-25

## 2019-04-25 RX ORDER — DOXYCYCLINE 100 MG/1
100 CAPSULE ORAL 2 TIMES DAILY
Qty: 14 CAPSULE | Refills: 0 | Status: SHIPPED | OUTPATIENT
Start: 2019-04-25 | End: 2019-06-04

## 2019-04-25 RX ORDER — SUCRALFATE 1 G/1
TABLET ORAL
Qty: 120 TABLET | Refills: 2 | OUTPATIENT
Start: 2019-04-25

## 2019-04-25 RX ADMIN — METHYLPREDNISOLONE SODIUM SUCCINATE 125 MG: 125 INJECTION, POWDER, FOR SOLUTION INTRAMUSCULAR; INTRAVENOUS at 19:44

## 2019-04-25 RX ADMIN — IPRATROPIUM BROMIDE AND ALBUTEROL SULFATE 3 ML: 2.5; .5 SOLUTION RESPIRATORY (INHALATION) at 19:40

## 2019-04-29 RX ORDER — ALBUTEROL SULFATE 2.5 MG/3ML
SOLUTION RESPIRATORY (INHALATION)
Qty: 150 ML | Refills: 2 | Status: ON HOLD | OUTPATIENT
Start: 2019-04-29 | End: 2019-06-30

## 2019-05-02 RX ORDER — SUCRALFATE 1 G/1
TABLET ORAL
Qty: 120 TABLET | Refills: 2 | OUTPATIENT
Start: 2019-05-02

## 2019-05-09 RX ORDER — OMEPRAZOLE 40 MG/1
CAPSULE, DELAYED RELEASE ORAL
Qty: 90 CAPSULE | Refills: 1 | Status: SHIPPED | OUTPATIENT
Start: 2019-05-09 | End: 2019-11-06 | Stop reason: SDUPTHER

## 2019-05-11 ENCOUNTER — HOSPITAL ENCOUNTER (EMERGENCY)
Facility: HOSPITAL | Age: 64
Discharge: HOME | End: 2019-05-11
Attending: EMERGENCY MEDICINE | Admitting: EMERGENCY MEDICINE

## 2019-05-11 ENCOUNTER — APPOINTMENT (OUTPATIENT)
Dept: GENERAL RADIOLOGY | Facility: HOSPITAL | Age: 64
End: 2019-05-11

## 2019-05-11 VITALS
WEIGHT: 135 LBS | RESPIRATION RATE: 16 BRPM | HEART RATE: 80 BPM | TEMPERATURE: 98.8 F | BODY MASS INDEX: 21.19 KG/M2 | DIASTOLIC BLOOD PRESSURE: 74 MMHG | SYSTOLIC BLOOD PRESSURE: 149 MMHG | OXYGEN SATURATION: 99 % | HEIGHT: 67 IN

## 2019-05-11 DIAGNOSIS — J44.1 COPD EXACERBATION (HCC): Primary | ICD-10-CM

## 2019-05-11 LAB
ALBUMIN SERPL-MCNC: 4.6 G/DL (ref 3.5–5.2)
ALBUMIN/GLOB SERPL: 1.4 G/DL
ALP SERPL-CCNC: 98 U/L (ref 39–117)
ALT SERPL W P-5'-P-CCNC: 14 U/L (ref 1–33)
ANION GAP SERPL CALCULATED.3IONS-SCNC: 16.2 MMOL/L
AST SERPL-CCNC: 16 U/L (ref 1–32)
BASOPHILS # BLD AUTO: 0.04 10*3/MM3 (ref 0–0.2)
BASOPHILS NFR BLD AUTO: 0.4 % (ref 0–1.5)
BILIRUB SERPL-MCNC: 0.6 MG/DL (ref 0.2–1.2)
BUN BLD-MCNC: 10 MG/DL (ref 8–23)
BUN/CREAT SERPL: 11.1 (ref 7–25)
CALCIUM SPEC-SCNC: 10.6 MG/DL (ref 8.6–10.5)
CHLORIDE SERPL-SCNC: 97 MMOL/L (ref 98–107)
CO2 SERPL-SCNC: 29.8 MMOL/L (ref 22–29)
CREAT BLD-MCNC: 0.9 MG/DL (ref 0.57–1)
DEPRECATED RDW RBC AUTO: 41.8 FL (ref 37–54)
EOSINOPHIL # BLD AUTO: 0.19 10*3/MM3 (ref 0–0.4)
EOSINOPHIL NFR BLD AUTO: 1.8 % (ref 0.3–6.2)
ERYTHROCYTE [DISTWIDTH] IN BLOOD BY AUTOMATED COUNT: 12.4 % (ref 12.3–15.4)
GFR SERPL CREATININE-BSD FRML MDRD: 63 ML/MIN/1.73
GLOBULIN UR ELPH-MCNC: 3.2 GM/DL
GLUCOSE BLD-MCNC: 115 MG/DL (ref 65–99)
HCT VFR BLD AUTO: 40.7 % (ref 34–46.6)
HGB BLD-MCNC: 12.8 G/DL (ref 12–15.9)
IMM GRANULOCYTES # BLD AUTO: 0.04 10*3/MM3 (ref 0–0.05)
IMM GRANULOCYTES NFR BLD AUTO: 0.4 % (ref 0–0.5)
LYMPHOCYTES # BLD AUTO: 1.47 10*3/MM3 (ref 0.7–3.1)
LYMPHOCYTES NFR BLD AUTO: 14.1 % (ref 19.6–45.3)
MCH RBC QN AUTO: 28.9 PG (ref 26.6–33)
MCHC RBC AUTO-ENTMCNC: 31.4 G/DL (ref 31.5–35.7)
MCV RBC AUTO: 91.9 FL (ref 79–97)
MONOCYTES # BLD AUTO: 0.42 10*3/MM3 (ref 0.1–0.9)
MONOCYTES NFR BLD AUTO: 4 % (ref 5–12)
NEUTROPHILS # BLD AUTO: 8.29 10*3/MM3 (ref 1.7–7)
NEUTROPHILS NFR BLD AUTO: 79.3 % (ref 42.7–76)
NRBC BLD AUTO-RTO: 0 /100 WBC (ref 0–0.2)
PLATELET # BLD AUTO: 247 10*3/MM3 (ref 140–450)
PMV BLD AUTO: 10.3 FL (ref 6–12)
POTASSIUM BLD-SCNC: 2.9 MMOL/L (ref 3.5–5.2)
PROT SERPL-MCNC: 7.8 G/DL (ref 6–8.5)
RBC # BLD AUTO: 4.43 10*6/MM3 (ref 3.77–5.28)
SODIUM BLD-SCNC: 143 MMOL/L (ref 136–145)
WBC NRBC COR # BLD: 10.45 10*3/MM3 (ref 3.4–10.8)

## 2019-05-11 PROCEDURE — 85025 COMPLETE CBC W/AUTO DIFF WBC: CPT | Performed by: EMERGENCY MEDICINE

## 2019-05-11 PROCEDURE — 80053 COMPREHEN METABOLIC PANEL: CPT | Performed by: EMERGENCY MEDICINE

## 2019-05-11 PROCEDURE — 94799 UNLISTED PULMONARY SVC/PX: CPT

## 2019-05-11 PROCEDURE — 25010000002 METHYLPREDNISOLONE PER 125 MG: Performed by: EMERGENCY MEDICINE

## 2019-05-11 PROCEDURE — 71046 X-RAY EXAM CHEST 2 VIEWS: CPT

## 2019-05-11 PROCEDURE — 99284 EMERGENCY DEPT VISIT MOD MDM: CPT

## 2019-05-11 PROCEDURE — 96374 THER/PROPH/DIAG INJ IV PUSH: CPT

## 2019-05-11 PROCEDURE — 94640 AIRWAY INHALATION TREATMENT: CPT

## 2019-05-11 RX ORDER — METHYLPREDNISOLONE SODIUM SUCCINATE 125 MG/2ML
125 INJECTION, POWDER, LYOPHILIZED, FOR SOLUTION INTRAMUSCULAR; INTRAVENOUS ONCE
Status: COMPLETED | OUTPATIENT
Start: 2019-05-11 | End: 2019-05-11

## 2019-05-11 RX ORDER — IPRATROPIUM BROMIDE AND ALBUTEROL SULFATE 2.5; .5 MG/3ML; MG/3ML
3 SOLUTION RESPIRATORY (INHALATION) ONCE
Status: COMPLETED | OUTPATIENT
Start: 2019-05-11 | End: 2019-05-11

## 2019-05-11 RX ORDER — PREDNISONE 1 MG/1
TABLET ORAL
Qty: 30 TABLET | Refills: 0 | Status: SHIPPED | OUTPATIENT
Start: 2019-05-11 | End: 2019-05-23

## 2019-05-11 RX ORDER — POTASSIUM CHLORIDE 750 MG/1
40 CAPSULE, EXTENDED RELEASE ORAL ONCE
Status: COMPLETED | OUTPATIENT
Start: 2019-05-11 | End: 2019-05-11

## 2019-05-11 RX ADMIN — METHYLPREDNISOLONE SODIUM SUCCINATE 125 MG: 125 INJECTION, POWDER, FOR SOLUTION INTRAMUSCULAR; INTRAVENOUS at 18:55

## 2019-05-11 RX ADMIN — IPRATROPIUM BROMIDE AND ALBUTEROL SULFATE 3 ML: 2.5; .5 SOLUTION RESPIRATORY (INHALATION) at 19:19

## 2019-05-11 RX ADMIN — POTASSIUM CHLORIDE 40 MEQ: 750 CAPSULE, EXTENDED RELEASE ORAL at 19:44

## 2019-05-13 ENCOUNTER — EPISODE CHANGES (OUTPATIENT)
Dept: CASE MANAGEMENT | Facility: OTHER | Age: 64
End: 2019-05-13

## 2019-05-13 ENCOUNTER — PATIENT OUTREACH (OUTPATIENT)
Dept: CASE MANAGEMENT | Facility: OTHER | Age: 64
End: 2019-05-13

## 2019-05-15 ENCOUNTER — TELEPHONE (OUTPATIENT)
Dept: FAMILY MEDICINE CLINIC | Facility: CLINIC | Age: 64
End: 2019-05-15

## 2019-05-15 RX ORDER — HYDROCODONE BITARTRATE AND ACETAMINOPHEN 5; 325 MG/1; MG/1
1 TABLET ORAL EVERY 8 HOURS PRN
Qty: 90 TABLET | Refills: 0 | Status: SHIPPED | OUTPATIENT
Start: 2019-05-15 | End: 2019-06-18 | Stop reason: SDUPTHER

## 2019-05-15 NOTE — TELEPHONE ENCOUNTER
PATIENT  CAME IN [facility] THAT PT NEEDS HYDROCODONE CALLED INTO [phone]..PLEASE CALL HIM IF THERE IS A PROBLEM [phone]

## 2019-05-23 RX ORDER — POTASSIUM CHLORIDE 1500 MG/1
TABLET, EXTENDED RELEASE ORAL
Qty: 90 TABLET | Refills: 1 | Status: SHIPPED | OUTPATIENT
Start: 2019-05-23 | End: 2019-06-04 | Stop reason: DRUGHIGH

## 2019-05-23 RX ORDER — FUROSEMIDE 40 MG/1
40 TABLET ORAL
Qty: 180 TABLET | Refills: 0 | Status: ON HOLD | OUTPATIENT
Start: 2019-05-23 | End: 2019-06-30

## 2019-05-29 ENCOUNTER — TELEPHONE (OUTPATIENT)
Dept: FAMILY MEDICINE CLINIC | Facility: CLINIC | Age: 64
End: 2019-05-29

## 2019-06-03 ENCOUNTER — TELEPHONE (OUTPATIENT)
Dept: FAMILY MEDICINE CLINIC | Facility: CLINIC | Age: 64
End: 2019-06-03

## 2019-06-04 ENCOUNTER — OFFICE VISIT (OUTPATIENT)
Dept: FAMILY MEDICINE CLINIC | Facility: CLINIC | Age: 64
End: 2019-06-04

## 2019-06-04 VITALS
OXYGEN SATURATION: 93 % | WEIGHT: 132.2 LBS | DIASTOLIC BLOOD PRESSURE: 64 MMHG | HEIGHT: 67 IN | BODY MASS INDEX: 20.75 KG/M2 | TEMPERATURE: 98.9 F | SYSTOLIC BLOOD PRESSURE: 138 MMHG | HEART RATE: 66 BPM

## 2019-06-04 DIAGNOSIS — E87.6 HYPOKALEMIA: ICD-10-CM

## 2019-06-04 DIAGNOSIS — E78.2 MIXED HYPERLIPIDEMIA: ICD-10-CM

## 2019-06-04 DIAGNOSIS — I10 ESSENTIAL HYPERTENSION: Primary | ICD-10-CM

## 2019-06-04 LAB
ANION GAP SERPL CALCULATED.3IONS-SCNC: 15.5 MMOL/L
BUN BLD-MCNC: 8 MG/DL (ref 8–23)
BUN/CREAT SERPL: 8.8 (ref 7–25)
CALCIUM SPEC-SCNC: 10.3 MG/DL (ref 8.6–10.5)
CHLORIDE SERPL-SCNC: 97 MMOL/L (ref 98–107)
CO2 SERPL-SCNC: 30.5 MMOL/L (ref 22–29)
CREAT BLD-MCNC: 0.91 MG/DL (ref 0.57–1)
GFR SERPL CREATININE-BSD FRML MDRD: 62 ML/MIN/1.73
GLUCOSE BLD-MCNC: 102 MG/DL (ref 65–99)
MAGNESIUM SERPL-MCNC: 1.9 MG/DL (ref 1.6–2.4)
POTASSIUM BLD-SCNC: 3.5 MMOL/L (ref 3.5–5.2)
SODIUM BLD-SCNC: 143 MMOL/L (ref 136–145)

## 2019-06-04 PROCEDURE — 36415 COLL VENOUS BLD VENIPUNCTURE: CPT | Performed by: INTERNAL MEDICINE

## 2019-06-04 PROCEDURE — 99214 OFFICE O/P EST MOD 30 MIN: CPT | Performed by: INTERNAL MEDICINE

## 2019-06-04 PROCEDURE — 80048 BASIC METABOLIC PNL TOTAL CA: CPT | Performed by: INTERNAL MEDICINE

## 2019-06-04 PROCEDURE — 83735 ASSAY OF MAGNESIUM: CPT | Performed by: INTERNAL MEDICINE

## 2019-06-04 RX ORDER — POTASSIUM CHLORIDE 20 MEQ/1
20 TABLET, EXTENDED RELEASE ORAL 2 TIMES DAILY
Qty: 60 TABLET | Refills: 3 | Status: SHIPPED | OUTPATIENT
Start: 2019-06-04 | End: 2019-06-05 | Stop reason: SDUPTHER

## 2019-06-04 RX ORDER — TEMAZEPAM 30 MG/1
30 CAPSULE ORAL NIGHTLY PRN
COMMUNITY
End: 2019-12-22

## 2019-06-04 RX ORDER — CIPROFLOXACIN 500 MG/1
500 TABLET, FILM COATED ORAL 2 TIMES DAILY
Qty: 20 TABLET | Refills: 0 | Status: ON HOLD | OUTPATIENT
Start: 2019-06-04 | End: 2019-06-30

## 2019-06-04 NOTE — PROGRESS NOTES
Subjective   Paz Browne is a 64 y.o. female.     History of Present Illness   Patient was seen for hypertension.  Blood pressures running 130s over 60s.  She does have hypokalemia from her mini neb treatments.  She was increased her potassium to 2 per day .  She will follow-up with labs in 1 weeks.  Her lipids have been controlled with diet exercise and a statin.    Dictated utilizing Dragon dictation. If there are questions or for further clarification, please contact me.  The following portions of the patient's history were reviewed and updated as appropriate: allergies, current medications, past family history, past medical history, past social history, past surgical history and problem list.    Review of Systems   Constitutional: Negative for fatigue and fever.   HENT: Positive for congestion. Negative for trouble swallowing.    Eyes: Negative for discharge and visual disturbance.   Respiratory: Negative for choking and shortness of breath.    Cardiovascular: Negative for chest pain and palpitations.   Gastrointestinal: Negative for abdominal pain and blood in stool.   Endocrine: Negative.    Genitourinary: Negative for genital sores and hematuria.   Musculoskeletal: Negative for gait problem and joint swelling.   Skin: Negative for color change, pallor, rash and wound.   Allergic/Immunologic: Positive for environmental allergies. Negative for immunocompromised state.   Neurological: Negative for facial asymmetry and speech difficulty.   Psychiatric/Behavioral: Negative for hallucinations and suicidal ideas.       Objective   Physical Exam   Constitutional: She is oriented to person, place, and time. She appears well-developed and well-nourished.   HENT:   Head: Normocephalic.   Eyes: Conjunctivae are normal. Pupils are equal, round, and reactive to light.   Neck: Normal range of motion. Neck supple.   Cardiovascular: Normal rate, regular rhythm and normal heart sounds.   Pulmonary/Chest: Effort normal and  breath sounds normal.   Abdominal: Soft. Bowel sounds are normal.   Musculoskeletal: Normal range of motion.   Neurological: She is alert and oriented to person, place, and time.   Skin: Skin is warm and dry.   Psychiatric: She has a normal mood and affect. Her behavior is normal. Judgment and thought content normal.   Nursing note and vitals reviewed.      Assessment/Plan   Problems Addressed this Visit        Cardiovascular and Mediastinum    Hypertension - Primary    Hyperlipidemia      Other Visit Diagnoses     Hypokalemia        Relevant Orders    Basic Metabolic Panel    Magnesium

## 2019-06-05 ENCOUNTER — TELEPHONE (OUTPATIENT)
Dept: FAMILY MEDICINE CLINIC | Facility: CLINIC | Age: 64
End: 2019-06-05

## 2019-06-05 RX ORDER — POTASSIUM CHLORIDE 20 MEQ/1
TABLET, EXTENDED RELEASE ORAL
Qty: 90 TABLET | Refills: 3 | Status: SHIPPED | OUTPATIENT
Start: 2019-06-05 | End: 2019-09-05 | Stop reason: SDUPTHER

## 2019-06-05 NOTE — TELEPHONE ENCOUNTER
Magnesium 1.9 needs to take 1 magnesium daily and potassium 3.5 needs to increase to 3 a day medication sent to pharmacist

## 2019-06-13 RX ORDER — TEMAZEPAM 30 MG/1
CAPSULE ORAL
Qty: 90 CAPSULE | Refills: 1 | Status: ON HOLD | OUTPATIENT
Start: 2019-06-13 | End: 2019-06-30

## 2019-06-18 ENCOUNTER — TELEPHONE (OUTPATIENT)
Dept: FAMILY MEDICINE CLINIC | Facility: CLINIC | Age: 64
End: 2019-06-18

## 2019-06-18 RX ORDER — HYDROCODONE BITARTRATE AND ACETAMINOPHEN 5; 325 MG/1; MG/1
1 TABLET ORAL EVERY 8 HOURS PRN
Qty: 90 TABLET | Refills: 0 | Status: SHIPPED | OUTPATIENT
Start: 2019-06-18 | End: 2019-07-16 | Stop reason: SDUPTHER

## 2019-06-27 ENCOUNTER — TELEPHONE (OUTPATIENT)
Dept: FAMILY MEDICINE CLINIC | Facility: CLINIC | Age: 64
End: 2019-06-27

## 2019-06-30 ENCOUNTER — HOSPITAL ENCOUNTER (INPATIENT)
Facility: HOSPITAL | Age: 64
LOS: 3 days | Discharge: HOME | End: 2019-07-03
Attending: EMERGENCY MEDICINE | Admitting: INTERNAL MEDICINE

## 2019-06-30 ENCOUNTER — APPOINTMENT (OUTPATIENT)
Dept: GENERAL RADIOLOGY | Facility: HOSPITAL | Age: 64
End: 2019-06-30

## 2019-06-30 DIAGNOSIS — R94.31 ABNORMAL EKG: ICD-10-CM

## 2019-06-30 DIAGNOSIS — J44.1 COPD EXACERBATION (HCC): Primary | ICD-10-CM

## 2019-06-30 PROBLEM — J18.9 PNEUMONIA DUE TO INFECTIOUS ORGANISM: Status: RESOLVED | Noted: 2018-07-18 | Resolved: 2019-06-30

## 2019-06-30 PROBLEM — G47.01 INSOMNIA DUE TO MEDICAL CONDITION: Chronic | Status: RESOLVED | Noted: 2019-02-01 | Resolved: 2019-06-30

## 2019-06-30 PROBLEM — K92.2 LOWER GI BLEED: Status: RESOLVED | Noted: 2017-08-09 | Resolved: 2019-06-30

## 2019-06-30 PROBLEM — K92.2 GI BLEED: Status: RESOLVED | Noted: 2017-12-01 | Resolved: 2019-06-30

## 2019-06-30 LAB
ALBUMIN SERPL-MCNC: 4.3 G/DL (ref 3.5–5.2)
ALBUMIN/GLOB SERPL: 1.4 G/DL
ALP SERPL-CCNC: 87 U/L (ref 39–117)
ALT SERPL W P-5'-P-CCNC: 12 U/L (ref 1–33)
ANION GAP SERPL CALCULATED.3IONS-SCNC: 11.7 MMOL/L (ref 5–15)
AST SERPL-CCNC: 15 U/L (ref 1–32)
B PARAPERT DNA SPEC QL NAA+PROBE: NOT DETECTED
B PERT DNA SPEC QL NAA+PROBE: NOT DETECTED
BASOPHILS # BLD AUTO: 0.03 10*3/MM3 (ref 0–0.2)
BASOPHILS NFR BLD AUTO: 0.2 % (ref 0–1.5)
BILIRUB SERPL-MCNC: 1.1 MG/DL (ref 0.2–1.2)
BUN BLD-MCNC: 12 MG/DL (ref 8–23)
BUN/CREAT SERPL: 13.5 (ref 7–25)
C PNEUM DNA NPH QL NAA+NON-PROBE: NOT DETECTED
CALCIUM SPEC-SCNC: 10.1 MG/DL (ref 8.6–10.5)
CHLORIDE SERPL-SCNC: 98 MMOL/L (ref 98–107)
CO2 SERPL-SCNC: 27.3 MMOL/L (ref 22–29)
CREAT BLD-MCNC: 0.89 MG/DL (ref 0.57–1)
D DIMER PPP FEU-MCNC: 0.48 MCGFEU/ML (ref 0–0.49)
D-LACTATE SERPL-SCNC: 0.7 MMOL/L (ref 0.5–2)
DEPRECATED RDW RBC AUTO: 42.6 FL (ref 37–54)
EOSINOPHIL # BLD AUTO: 0.1 10*3/MM3 (ref 0–0.4)
EOSINOPHIL NFR BLD AUTO: 0.8 % (ref 0.3–6.2)
ERYTHROCYTE [DISTWIDTH] IN BLOOD BY AUTOMATED COUNT: 12.9 % (ref 12.3–15.4)
FLUAV H1 2009 PAND RNA NPH QL NAA+PROBE: NOT DETECTED
FLUAV H1 HA GENE NPH QL NAA+PROBE: NOT DETECTED
FLUAV H3 RNA NPH QL NAA+PROBE: NOT DETECTED
FLUAV SUBTYP SPEC NAA+PROBE: NOT DETECTED
FLUBV RNA ISLT QL NAA+PROBE: NOT DETECTED
GFR SERPL CREATININE-BSD FRML MDRD: 63 ML/MIN/1.73
GLOBULIN UR ELPH-MCNC: 3 GM/DL
GLUCOSE BLD-MCNC: 116 MG/DL (ref 65–99)
HADV DNA SPEC NAA+PROBE: NOT DETECTED
HCOV 229E RNA SPEC QL NAA+PROBE: NOT DETECTED
HCOV HKU1 RNA SPEC QL NAA+PROBE: NOT DETECTED
HCOV NL63 RNA SPEC QL NAA+PROBE: NOT DETECTED
HCOV OC43 RNA SPEC QL NAA+PROBE: NOT DETECTED
HCT VFR BLD AUTO: 36 % (ref 34–46.6)
HGB BLD-MCNC: 11.5 G/DL (ref 12–15.9)
HMPV RNA NPH QL NAA+NON-PROBE: NOT DETECTED
HOLD SPECIMEN: NORMAL
HPIV1 RNA SPEC QL NAA+PROBE: NOT DETECTED
HPIV2 RNA SPEC QL NAA+PROBE: NOT DETECTED
HPIV3 RNA NPH QL NAA+PROBE: NOT DETECTED
HPIV4 P GENE NPH QL NAA+PROBE: NOT DETECTED
IMM GRANULOCYTES # BLD AUTO: 0.05 10*3/MM3 (ref 0–0.05)
IMM GRANULOCYTES NFR BLD AUTO: 0.4 % (ref 0–0.5)
LYMPHOCYTES # BLD AUTO: 0.83 10*3/MM3 (ref 0.7–3.1)
LYMPHOCYTES NFR BLD AUTO: 6.2 % (ref 19.6–45.3)
M PNEUMO IGG SER IA-ACNC: NOT DETECTED
MCH RBC QN AUTO: 28.5 PG (ref 26.6–33)
MCHC RBC AUTO-ENTMCNC: 31.9 G/DL (ref 31.5–35.7)
MCV RBC AUTO: 89.1 FL (ref 79–97)
MONOCYTES # BLD AUTO: 0.61 10*3/MM3 (ref 0.1–0.9)
MONOCYTES NFR BLD AUTO: 4.6 % (ref 5–12)
NEUTROPHILS # BLD AUTO: 11.68 10*3/MM3 (ref 1.7–7)
NEUTROPHILS NFR BLD AUTO: 87.8 % (ref 42.7–76)
NRBC BLD AUTO-RTO: 0 /100 WBC (ref 0–0.2)
NT-PROBNP SERPL-MCNC: 3271 PG/ML (ref 5–900)
PLATELET # BLD AUTO: 179 10*3/MM3 (ref 140–450)
PMV BLD AUTO: 11.8 FL (ref 6–12)
POTASSIUM BLD-SCNC: 4.4 MMOL/L (ref 3.5–5.2)
PROCALCITONIN SERPL-MCNC: 0.05 NG/ML (ref 0.1–0.25)
PROT SERPL-MCNC: 7.3 G/DL (ref 6–8.5)
RBC # BLD AUTO: 4.04 10*6/MM3 (ref 3.77–5.28)
RHINOVIRUS RNA SPEC NAA+PROBE: NOT DETECTED
RSV RNA NPH QL NAA+NON-PROBE: NOT DETECTED
SODIUM BLD-SCNC: 137 MMOL/L (ref 136–145)
TROPONIN T SERPL-MCNC: <0.01 NG/ML (ref 0–0.03)
WBC NRBC COR # BLD: 13.3 10*3/MM3 (ref 3.4–10.8)
WHOLE BLOOD HOLD SPECIMEN: NORMAL
WHOLE BLOOD HOLD SPECIMEN: NORMAL

## 2019-06-30 PROCEDURE — 87633 RESP VIRUS 12-25 TARGETS: CPT | Performed by: INTERNAL MEDICINE

## 2019-06-30 PROCEDURE — 83880 ASSAY OF NATRIURETIC PEPTIDE: CPT | Performed by: NURSE PRACTITIONER

## 2019-06-30 PROCEDURE — 87486 CHLMYD PNEUM DNA AMP PROBE: CPT | Performed by: INTERNAL MEDICINE

## 2019-06-30 PROCEDURE — 87070 CULTURE OTHR SPECIMN AEROBIC: CPT | Performed by: INTERNAL MEDICINE

## 2019-06-30 PROCEDURE — 93010 ELECTROCARDIOGRAM REPORT: CPT | Performed by: INTERNAL MEDICINE

## 2019-06-30 PROCEDURE — 80053 COMPREHEN METABOLIC PANEL: CPT | Performed by: NURSE PRACTITIONER

## 2019-06-30 PROCEDURE — 94640 AIRWAY INHALATION TREATMENT: CPT

## 2019-06-30 PROCEDURE — 83605 ASSAY OF LACTIC ACID: CPT | Performed by: NURSE PRACTITIONER

## 2019-06-30 PROCEDURE — 85025 COMPLETE CBC W/AUTO DIFF WBC: CPT | Performed by: NURSE PRACTITIONER

## 2019-06-30 PROCEDURE — 85379 FIBRIN DEGRADATION QUANT: CPT | Performed by: EMERGENCY MEDICINE

## 2019-06-30 PROCEDURE — 99284 EMERGENCY DEPT VISIT MOD MDM: CPT

## 2019-06-30 PROCEDURE — 87185 SC STD ENZYME DETCJ PER NZM: CPT | Performed by: INTERNAL MEDICINE

## 2019-06-30 PROCEDURE — 84484 ASSAY OF TROPONIN QUANT: CPT | Performed by: NURSE PRACTITIONER

## 2019-06-30 PROCEDURE — 87798 DETECT AGENT NOS DNA AMP: CPT | Performed by: INTERNAL MEDICINE

## 2019-06-30 PROCEDURE — 87040 BLOOD CULTURE FOR BACTERIA: CPT | Performed by: NURSE PRACTITIONER

## 2019-06-30 PROCEDURE — 25010000002 METHYLPREDNISOLONE PER 125 MG: Performed by: NURSE PRACTITIONER

## 2019-06-30 PROCEDURE — 84145 PROCALCITONIN (PCT): CPT | Performed by: NURSE PRACTITIONER

## 2019-06-30 PROCEDURE — 94799 UNLISTED PULMONARY SVC/PX: CPT

## 2019-06-30 PROCEDURE — 71046 X-RAY EXAM CHEST 2 VIEWS: CPT

## 2019-06-30 PROCEDURE — 87205 SMEAR GRAM STAIN: CPT | Performed by: INTERNAL MEDICINE

## 2019-06-30 PROCEDURE — 87581 M.PNEUMON DNA AMP PROBE: CPT | Performed by: INTERNAL MEDICINE

## 2019-06-30 PROCEDURE — 93005 ELECTROCARDIOGRAM TRACING: CPT | Performed by: INTERNAL MEDICINE

## 2019-06-30 PROCEDURE — 99214 OFFICE O/P EST MOD 30 MIN: CPT | Performed by: INTERNAL MEDICINE

## 2019-06-30 PROCEDURE — 87077 CULTURE AEROBIC IDENTIFY: CPT | Performed by: INTERNAL MEDICINE

## 2019-06-30 PROCEDURE — 93005 ELECTROCARDIOGRAM TRACING: CPT | Performed by: NURSE PRACTITIONER

## 2019-06-30 RX ORDER — ONDANSETRON 4 MG/1
4 TABLET, FILM COATED ORAL EVERY 12 HOURS PRN
Status: DISCONTINUED | OUTPATIENT
Start: 2019-06-30 | End: 2019-07-02

## 2019-06-30 RX ORDER — SODIUM CHLORIDE 0.9 % (FLUSH) 0.9 %
3-10 SYRINGE (ML) INJECTION AS NEEDED
Status: DISCONTINUED | OUTPATIENT
Start: 2019-06-30 | End: 2019-07-03 | Stop reason: HOSPADM

## 2019-06-30 RX ORDER — ROFLUMILAST 500 UG/1
500 TABLET ORAL DAILY
Status: DISCONTINUED | OUTPATIENT
Start: 2019-07-01 | End: 2019-07-03 | Stop reason: HOSPADM

## 2019-06-30 RX ORDER — SERTRALINE HYDROCHLORIDE 100 MG/1
100 TABLET, FILM COATED ORAL DAILY
Status: DISCONTINUED | OUTPATIENT
Start: 2019-07-01 | End: 2019-07-03 | Stop reason: HOSPADM

## 2019-06-30 RX ORDER — METHYLPREDNISOLONE SODIUM SUCCINATE 125 MG/2ML
125 INJECTION, POWDER, LYOPHILIZED, FOR SOLUTION INTRAMUSCULAR; INTRAVENOUS ONCE
Status: COMPLETED | OUTPATIENT
Start: 2019-06-30 | End: 2019-06-30

## 2019-06-30 RX ORDER — SODIUM CHLORIDE 0.9 % (FLUSH) 0.9 %
3 SYRINGE (ML) INJECTION EVERY 12 HOURS SCHEDULED
Status: DISCONTINUED | OUTPATIENT
Start: 2019-06-30 | End: 2019-07-03 | Stop reason: HOSPADM

## 2019-06-30 RX ORDER — CYCLOBENZAPRINE HCL 10 MG
10 TABLET ORAL NIGHTLY
Status: DISCONTINUED | OUTPATIENT
Start: 2019-06-30 | End: 2019-07-03 | Stop reason: HOSPADM

## 2019-06-30 RX ORDER — HYDROCODONE BITARTRATE AND ACETAMINOPHEN 5; 325 MG/1; MG/1
1 TABLET ORAL EVERY 8 HOURS PRN
Status: DISCONTINUED | OUTPATIENT
Start: 2019-06-30 | End: 2019-07-01

## 2019-06-30 RX ORDER — ALBUTEROL SULFATE 2.5 MG/3ML
2.5 SOLUTION RESPIRATORY (INHALATION)
Status: COMPLETED | OUTPATIENT
Start: 2019-06-30 | End: 2019-06-30

## 2019-06-30 RX ORDER — ACETAMINOPHEN 325 MG/1
650 TABLET ORAL EVERY 4 HOURS PRN
Status: DISCONTINUED | OUTPATIENT
Start: 2019-06-30 | End: 2019-07-03 | Stop reason: HOSPADM

## 2019-06-30 RX ORDER — TEMAZEPAM 15 MG/1
30 CAPSULE ORAL NIGHTLY PRN
Status: DISCONTINUED | OUTPATIENT
Start: 2019-06-30 | End: 2019-07-03 | Stop reason: HOSPADM

## 2019-06-30 RX ORDER — SENNA AND DOCUSATE SODIUM 50; 8.6 MG/1; MG/1
2 TABLET, FILM COATED ORAL 2 TIMES DAILY PRN
Status: DISCONTINUED | OUTPATIENT
Start: 2019-06-30 | End: 2019-07-03 | Stop reason: HOSPADM

## 2019-06-30 RX ORDER — FERROUS SULFATE 325(65) MG
325 TABLET ORAL EVERY OTHER DAY
Status: DISCONTINUED | OUTPATIENT
Start: 2019-07-02 | End: 2019-07-03 | Stop reason: HOSPADM

## 2019-06-30 RX ORDER — CARVEDILOL 6.25 MG/1
6.25 TABLET ORAL 2 TIMES DAILY WITH MEALS
Status: DISCONTINUED | OUTPATIENT
Start: 2019-06-30 | End: 2019-07-03 | Stop reason: HOSPADM

## 2019-06-30 RX ORDER — ATORVASTATIN CALCIUM 20 MG/1
40 TABLET, FILM COATED ORAL DAILY
Status: DISCONTINUED | OUTPATIENT
Start: 2019-07-01 | End: 2019-07-03 | Stop reason: HOSPADM

## 2019-06-30 RX ORDER — SODIUM CHLORIDE 0.9 % (FLUSH) 0.9 %
10 SYRINGE (ML) INJECTION AS NEEDED
Status: DISCONTINUED | OUTPATIENT
Start: 2019-06-30 | End: 2019-07-03 | Stop reason: HOSPADM

## 2019-06-30 RX ORDER — HYDROCODONE BITARTRATE AND ACETAMINOPHEN 7.5; 325 MG/1; MG/1
1 TABLET ORAL ONCE
Status: COMPLETED | OUTPATIENT
Start: 2019-06-30 | End: 2019-06-30

## 2019-06-30 RX ORDER — ALBUTEROL SULFATE 2.5 MG/3ML
2.5 SOLUTION RESPIRATORY (INHALATION) EVERY 6 HOURS PRN
Status: DISCONTINUED | OUTPATIENT
Start: 2019-06-30 | End: 2019-07-02

## 2019-06-30 RX ORDER — ASPIRIN 81 MG/1
81 TABLET ORAL DAILY
Status: DISCONTINUED | OUTPATIENT
Start: 2019-07-01 | End: 2019-07-03 | Stop reason: HOSPADM

## 2019-06-30 RX ORDER — LISINOPRIL 2.5 MG/1
2.5 TABLET ORAL
Status: DISCONTINUED | OUTPATIENT
Start: 2019-07-01 | End: 2019-07-03 | Stop reason: HOSPADM

## 2019-06-30 RX ORDER — FUROSEMIDE 40 MG/1
40 TABLET ORAL
Status: DISCONTINUED | OUTPATIENT
Start: 2019-07-01 | End: 2019-07-03 | Stop reason: HOSPADM

## 2019-06-30 RX ORDER — POLYETHYLENE GLYCOL 3350 17 G/17G
17 POWDER, FOR SOLUTION ORAL 2 TIMES DAILY PRN
Status: DISCONTINUED | OUTPATIENT
Start: 2019-06-30 | End: 2019-07-03 | Stop reason: HOSPADM

## 2019-06-30 RX ORDER — SODIUM CHLORIDE FOR INHALATION 7 %
4 VIAL, NEBULIZER (ML) INHALATION ONCE AS NEEDED
Status: DISCONTINUED | OUTPATIENT
Start: 2019-06-30 | End: 2019-07-03 | Stop reason: HOSPADM

## 2019-06-30 RX ORDER — ALBUTEROL SULFATE 2.5 MG/3ML
2.5 SOLUTION RESPIRATORY (INHALATION) ONCE AS NEEDED
Status: DISCONTINUED | OUTPATIENT
Start: 2019-06-30 | End: 2019-07-03 | Stop reason: HOSPADM

## 2019-06-30 RX ORDER — NITROGLYCERIN 0.4 MG/1
0.4 TABLET SUBLINGUAL
Status: DISCONTINUED | OUTPATIENT
Start: 2019-06-30 | End: 2019-07-03 | Stop reason: HOSPADM

## 2019-06-30 RX ORDER — IPRATROPIUM BROMIDE AND ALBUTEROL SULFATE 2.5; .5 MG/3ML; MG/3ML
3 SOLUTION RESPIRATORY (INHALATION)
Status: DISCONTINUED | OUTPATIENT
Start: 2019-06-30 | End: 2019-06-30

## 2019-06-30 RX ORDER — ROPINIROLE 2 MG/1
2 TABLET, FILM COATED ORAL NIGHTLY
Status: DISCONTINUED | OUTPATIENT
Start: 2019-06-30 | End: 2019-07-03 | Stop reason: HOSPADM

## 2019-06-30 RX ADMIN — CARVEDILOL 6.25 MG: 6.25 TABLET, FILM COATED ORAL at 20:15

## 2019-06-30 RX ADMIN — ALBUTEROL SULFATE 2.5 MG: 2.5 SOLUTION RESPIRATORY (INHALATION) at 09:09

## 2019-06-30 RX ADMIN — ALBUTEROL SULFATE 2.5 MG: 2.5 SOLUTION RESPIRATORY (INHALATION) at 08:21

## 2019-06-30 RX ADMIN — ROPINIROLE 2 MG: 2 TABLET, FILM COATED ORAL at 20:15

## 2019-06-30 RX ADMIN — HYDROCODONE BITARTRATE AND ACETAMINOPHEN 1 TABLET: 5; 325 TABLET ORAL at 16:47

## 2019-06-30 RX ADMIN — ALBUTEROL SULFATE 2.5 MG: 2.5 SOLUTION RESPIRATORY (INHALATION) at 07:58

## 2019-06-30 RX ADMIN — TEMAZEPAM 30 MG: 15 CAPSULE ORAL at 20:15

## 2019-06-30 RX ADMIN — ACETAMINOPHEN 650 MG: 325 TABLET, FILM COATED ORAL at 23:16

## 2019-06-30 RX ADMIN — CYCLOBENZAPRINE 10 MG: 10 TABLET, FILM COATED ORAL at 20:15

## 2019-06-30 RX ADMIN — HYDROCODONE BITARTRATE AND ACETAMINOPHEN 1 TABLET: 7.5; 325 TABLET ORAL at 10:34

## 2019-06-30 RX ADMIN — SODIUM CHLORIDE, PRESERVATIVE FREE 3 ML: 5 INJECTION INTRAVENOUS at 20:15

## 2019-06-30 RX ADMIN — ACETAMINOPHEN 650 MG: 325 TABLET, FILM COATED ORAL at 19:17

## 2019-06-30 RX ADMIN — METHYLPREDNISOLONE SODIUM SUCCINATE 125 MG: 125 INJECTION, POWDER, FOR SOLUTION INTRAMUSCULAR; INTRAVENOUS at 07:37

## 2019-07-01 LAB — NT-PROBNP SERPL-MCNC: 2540 PG/ML (ref 5–900)

## 2019-07-01 PROCEDURE — 99233 SBSQ HOSP IP/OBS HIGH 50: CPT | Performed by: INTERNAL MEDICINE

## 2019-07-01 PROCEDURE — 94799 UNLISTED PULMONARY SVC/PX: CPT

## 2019-07-01 PROCEDURE — 84145 PROCALCITONIN (PCT): CPT | Performed by: INTERNAL MEDICINE

## 2019-07-01 PROCEDURE — G0378 HOSPITAL OBSERVATION PER HR: HCPCS

## 2019-07-01 PROCEDURE — 83880 ASSAY OF NATRIURETIC PEPTIDE: CPT | Performed by: INTERNAL MEDICINE

## 2019-07-01 PROCEDURE — 94640 AIRWAY INHALATION TREATMENT: CPT

## 2019-07-01 RX ORDER — HYDROCODONE BITARTRATE AND ACETAMINOPHEN 5; 325 MG/1; MG/1
1 TABLET ORAL EVERY 4 HOURS PRN
Status: DISCONTINUED | OUTPATIENT
Start: 2019-07-01 | End: 2019-07-03 | Stop reason: HOSPADM

## 2019-07-01 RX ADMIN — ONDANSETRON 4 MG: 4 TABLET, FILM COATED ORAL at 15:02

## 2019-07-01 RX ADMIN — ACETAMINOPHEN 650 MG: 325 TABLET, FILM COATED ORAL at 06:44

## 2019-07-01 RX ADMIN — HYDROCODONE BITARTRATE AND ACETAMINOPHEN 1 TABLET: 5; 325 TABLET ORAL at 01:31

## 2019-07-01 RX ADMIN — HYDROCODONE BITARTRATE AND ACETAMINOPHEN 1 TABLET: 5; 325 TABLET ORAL at 13:56

## 2019-07-01 RX ADMIN — TIOTROPIUM BROMIDE INHALATION SPRAY 2 PUFF: 3.12 SPRAY, METERED RESPIRATORY (INHALATION) at 07:25

## 2019-07-01 RX ADMIN — CARVEDILOL 6.25 MG: 6.25 TABLET, FILM COATED ORAL at 22:41

## 2019-07-01 RX ADMIN — SODIUM CHLORIDE, PRESERVATIVE FREE 3 ML: 5 INJECTION INTRAVENOUS at 09:01

## 2019-07-01 RX ADMIN — FUROSEMIDE 40 MG: 40 TABLET ORAL at 06:45

## 2019-07-01 RX ADMIN — ATORVASTATIN CALCIUM 40 MG: 20 TABLET, FILM COATED ORAL at 09:00

## 2019-07-01 RX ADMIN — ALBUTEROL SULFATE 2.5 MG: 2.5 SOLUTION RESPIRATORY (INHALATION) at 01:45

## 2019-07-01 RX ADMIN — LISINOPRIL 2.5 MG: 2.5 TABLET ORAL at 09:00

## 2019-07-01 RX ADMIN — SODIUM CHLORIDE, PRESERVATIVE FREE 3 ML: 5 INJECTION INTRAVENOUS at 22:42

## 2019-07-01 RX ADMIN — HYDROCODONE BITARTRATE AND HOMATROPINE METHYLBROMIDE 5 ML: 5; 1.5 SOLUTION ORAL at 10:49

## 2019-07-01 RX ADMIN — ACETAMINOPHEN 325 MG: 325 TABLET, FILM COATED ORAL at 18:20

## 2019-07-01 RX ADMIN — ALBUTEROL SULFATE 2.5 MG: 2.5 SOLUTION RESPIRATORY (INHALATION) at 19:27

## 2019-07-01 RX ADMIN — HYDROCODONE BITARTRATE AND ACETAMINOPHEN 1 TABLET: 5; 325 TABLET ORAL at 18:20

## 2019-07-01 RX ADMIN — TEMAZEPAM 30 MG: 15 CAPSULE ORAL at 22:41

## 2019-07-01 RX ADMIN — ROFLUMILAST 500 MCG: 500 TABLET ORAL at 09:00

## 2019-07-01 RX ADMIN — HYDROCODONE BITARTRATE AND ACETAMINOPHEN 1 TABLET: 5; 325 TABLET ORAL at 09:01

## 2019-07-01 RX ADMIN — ACETAMINOPHEN 650 MG: 325 TABLET, FILM COATED ORAL at 22:41

## 2019-07-01 RX ADMIN — SERTRALINE 100 MG: 100 TABLET, FILM COATED ORAL at 09:00

## 2019-07-01 RX ADMIN — CYCLOBENZAPRINE 10 MG: 10 TABLET, FILM COATED ORAL at 22:42

## 2019-07-01 RX ADMIN — ROPINIROLE 2 MG: 2 TABLET, FILM COATED ORAL at 22:41

## 2019-07-01 RX ADMIN — ASPIRIN 81 MG: 81 TABLET, COATED ORAL at 09:00

## 2019-07-01 RX ADMIN — CARVEDILOL 6.25 MG: 6.25 TABLET, FILM COATED ORAL at 06:45

## 2019-07-01 RX ADMIN — HYDROCODONE BITARTRATE AND ACETAMINOPHEN 1 TABLET: 5; 325 TABLET ORAL at 22:41

## 2019-07-01 NOTE — PLAN OF CARE
Problem: Patient Care Overview  Goal: Plan of Care Review   07/01/19 0428   Coping/Psychosocial   Plan of Care Reviewed With patient   Plan of Care Review   Progress improving   OTHER   Outcome Summary pain controlled with PO med, breathing treatment given 1x, on 3L NC, VSS, will continue to monitor        Problem: Pain, Chronic (Adult)  Goal: Acceptable Pain/Comfort Level and Functional Ability  Outcome: Ongoing (interventions implemented as appropriate)      Problem: Chronic Obstructive Pulmonary Disease (Adult)  Goal: Signs and Symptoms of Listed Potential Problems Will be Absent, Minimized or Managed (Chronic Obstructive Pulmonary Disease)  Outcome: Ongoing (interventions implemented as appropriate)

## 2019-07-01 NOTE — PROGRESS NOTES
"CC: Mitral valve replacement    Interval History:   No real complaints today.    Vital Signs  Temp:  [97.1 °F (36.2 °C)-98.5 °F (36.9 °C)] 97.6 °F (36.4 °C)  Heart Rate:  [65-85] 65  Resp:  [12-18] 14  BP: (102-141)/(46-88) 117/59    Intake/Output Summary (Last 24 hours) at 7/1/2019 0729  Last data filed at 6/30/2019 1800  Gross per 24 hour   Intake 420 ml   Output --   Net 420 ml     Flowsheet Rows      First Filed Value   Admission Height  170.2 cm (67\") Documented at 06/30/2019 0557   Admission Weight  62.1 kg (137 lb) Documented at 06/30/2019 0632          PHYSICAL EXAM:  General: No acute distress  Resp:NL Rate, unlabored, tattered anterior wheezing  CV:NL rate and rhythm, NL PMI, Nl S1 and S2, 2/6 systolic murmur, no gallop, no rub, No JVD. Normal pedal pulses  ABD:Nl sounds, no masses or tenderness, nondistended, no guarding or rebound  Neuro: alert,cooperative and oriented  Extr: No edema or cyanosis, moves all extremities      Results Review:    Results from last 7 days   Lab Units 06/30/19  0638   SODIUM mmol/L 137   POTASSIUM mmol/L 4.4   CHLORIDE mmol/L 98   CO2 mmol/L 27.3   BUN mg/dL 12   CREATININE mg/dL 0.89   GLUCOSE mg/dL 116*   CALCIUM mg/dL 10.1     Results from last 7 days   Lab Units 06/30/19  0638   TROPONIN T ng/mL <0.010     Results from last 7 days   Lab Units 06/30/19  0638   WBC 10*3/mm3 13.30*   HEMOGLOBIN g/dL 11.5*   HEMATOCRIT % 36.0   PLATELETS 10*3/mm3 179                     I reviewed the patient's new clinical results.  I personally viewed and interpreted the patient's EKG/Telemetry data        Medication Review:   Meds reviewed         Assessment/Plan  1.  Respiratory failure secondary to COPD exacerbation.  Per pulmonary.  Her proBNP is marginally elevated now on daily diuretic.  2.  Abnormal ECG do not believe its much difference in her previous troponin negative echocardiogram pending.  3.  Previous mitral valve replacement with tissue valve tricuspid valve repair for severe " tricuspid and mitral insufficiency.  4.  Paroxysmal atrial fibrillation/flutter.  Status post atrial appendage closure and cryo-Maze procedure been remaining in sinus rhythm.  5.  Hypertension follow blood pressure.  6.  Hyperlipidemia continue home therapy.  7.  History of renal insufficiency.      Earnest Gan MD  07/01/19  7:29 AM

## 2019-07-01 NOTE — PLAN OF CARE
Problem: Patient Care Overview  Goal: Plan of Care Review  Outcome: Ongoing (interventions implemented as appropriate)   07/01/19 4037   Coping/Psychosocial   Plan of Care Reviewed With patient   Plan of Care Review   Progress no change   OTHER   Outcome Summary Pt c/o chest pain with cough, PRN Norco given, frequency increased to [id number] prn. Hycodan ordered PRN cough. Pt states she feels worse today. Still waiting on echo to be done, cardiology dept states they might not get it done today, informed them that pt might be a discharge tomorrow and this needs to be done early if not able to do today. Possible discharge tomorrow.      Goal: Individualization and Mutuality  Outcome: Ongoing (interventions implemented as appropriate)    Goal: Discharge Needs Assessment  Outcome: Ongoing (interventions implemented as appropriate)    Goal: Interprofessional Rounds/Family Conf  Outcome: Ongoing (interventions implemented as appropriate)      Problem: Pain, Chronic (Adult)  Goal: Acceptable Pain/Comfort Level and Functional Ability  Outcome: Ongoing (interventions implemented as appropriate)      Problem: Chronic Obstructive Pulmonary Disease (Adult)  Goal: Signs and Symptoms of Listed Potential Problems Will be Absent, Minimized or Managed (Chronic Obstructive Pulmonary Disease)  Outcome: Ongoing (interventions implemented as appropriate)

## 2019-07-01 NOTE — PROGRESS NOTES
Discharge Planning Assessment  [facility]     Patient Name: Paz Browne  MRN: [id number]  Today's Date: 7/1/2019    Admit Date: 6/30/2019    Discharge Needs Assessment     Row Name 07/01/19 1506       Living Environment    Lives With  spouse;other relative(s)    Name(s) of Who Lives With Patient  lives with her spouse Chapincito and brother-in-law Eagle    Current Living Arrangements  home/apartment/condo    Primary Care Provided by  spouse/significant other    Provides Primary Care For  no one, unable/limited ability to care for self    Family Caregiver if Needed  spouse    Family Caregiver Names  Chapincito [phone], cell# [phone]    Quality of Family Relationships  helpful;involved;supportive    Able to Return to Prior Arrangements  yes       Resource/Environmental Concerns    Resource/Environmental Concerns  none    Transportation Concerns  car, none       Transition Planning    Patient/Family Anticipates Transition to  home with family    Patient/Family Anticipated Services at Transition  none    Transportation Anticipated  family or friend will provide       Discharge Needs Assessment    Readmission Within the Last 30 Days  no previous admission in last 30 days    Concerns to be Addressed  discharge planning    Equipment Currently Used at Home  nebulizer;oxygen;bath bench;other (see comments) portable O2 tanks    Anticipated Changes Related to Illness  none    Equipment Needed After Discharge  none    Outpatient/Agency/Support Group Needs  homecare agency used [facility] in the past    Discharge Facility/Level of Care Needs  -- n/a    Offered/Gave Vendor List  no    Current Discharge Risk  chronically ill;dependent with mobility/activities of daily living        Discharge Plan     Row Name 07/01/19 1509       Plan    Plan  Home via private auto with family to assist    Patient/Family in Agreement with Plan  yes    Plan Comments  Introduced self/explained role of CCP. Face sheet data confirmed. IMM letter  "checked. Discussed with pt that her admission status may change to observation after MD reviews status on rounds. Pt verbalized understanding. Pt states \"I feel awful. It hurts to breathe.\" Confirmed face sheet info. Pt states she lives at home with her spouse and LYNN Eagle Browne. She also has a dtr Ashley assist with her care at home. Pt no longer drives, her spouse does the driving for med  and shopping. Pt wears O2 at 3L at all times at home. O2 is thru [facility]. She has a nebulizer, portable O2 tanks, a shower bench at home. States she used [facility] after CABG and 2 knee surgeries. Denies previous SNF stay. Per pt request, left a note in EPIC to request RN remind her to take her O2 tubing home with her that she has used at the hospital. She states hers was lost during the ambulance ride to the hospital. Note in EPIC as requested. Pt states her spouse will be able to drive her home at [place] and she anticipates no further needs. CCP to follow.............JW        Destination      No service coordination in this encounter.      Durable Medical Equipment      No service coordination in this encounter.      Dialysis/Infusion      No service coordination in this encounter.      Home Medical Care      No service coordination in this encounter.      Therapy      No service coordination in this encounter.      Community Resources      No service coordination in this encounter.          Demographic Summary     Row Name 07/01/19 1503       General Information    Admission Type  inpatient    Arrived From  home    Required Notices Provided  Important Message from Medicare    Referral Source  admission list    Reason for Consult  discharge planning    Preferred Language  English     Used During This Interaction  no       Contact Information    Permission Granted to Share Info With  ;family/designee        Functional Status     Row Name 07/01/19 1505       Functional Status    Usual Activity " Tolerance  fair    Current Activity Tolerance  poor       Functional Status, IADL    Medications  assistive person    Meal Preparation  assistive person    Housekeeping  assistive person    Laundry  assistive person    Shopping  assistive person    IADL Comments  spouse does the driving and is able to assist pt at home as needed       Mental Status    General Appearance WDL  WDL        Psychosocial    No documentation.       Abuse/Neglect    No documentation.       Legal    No documentation.       Substance Abuse    No documentation.       Patient Forms    No documentation.           Jeni Burrell RN

## 2019-07-01 NOTE — PROGRESS NOTES
Dr. ANGELA Gage    [address]    7/1/2019    Patient ID:  Name:  Paz Browne  MRN:  [id number]  1955  64 y.o.  female            CC/Reason for visit: Shortness of breath, chest pain    HPI: She still complains of chest pain.  She still complains of significant cough which worsens her chest pain.  He denies radiation of the pain.  She has had fever at home but not here in the hospital.    ROS: No sputum production.  No hemoptysis, no chills    Vitals:  Vitals:    07/01/19 0644 07/01/19 0725 07/01/19 0754 07/01/19 1300   BP: 117/59  127/66 138/70   BP Location:   Right arm Right arm   Patient Position:   Lying Lying   Pulse: 76 65 75 86   Resp:  14 14 16   Temp:   98.4 °F (36.9 °C) 98.8 °F (37.1 °C)   TempSrc:   Oral Oral   SpO2:  97% 97% 96%   Weight:       Height:               Body mass index is 20.83 kg/m².    Intake/Output Summary (Last 24 hours) at 7/1/2019 1601  Last data filed at 7/1/2019 1457  Gross per 24 hour   Intake 630 ml   Output --   Net 630 ml       Exam:  GEN:  No distress  Alert, oriented x 3.   EYES:   Pupils equally round reactive bilaterally, conjunctiva moist and pink  ENT:    External ears/nose normal, OP clear  NECK:  No JVD, supple, midline trachea  LUNGS: Some scattered wheezes, no use of accessory muscles  CV:  Normal S1S2, without murmur, no edema  ABD:  Non tender, no enlarged liver or masses  EXT:  No cyanosis or clubbing    Scheduled meds:    aspirin 81 mg Oral Daily   atorvastatin 40 mg Oral Daily   carvedilol 6.25 mg Oral BID With Meals   cyclobenzaprine 10 mg Oral Nightly   [START ON 7/2/2019] ferrous sulfate 325 mg Oral Every Other Day   furosemide 40 mg Oral QAM AC   lisinopril 2.5 mg Oral Q24H   roflumilast 500 mcg Oral Daily   rOPINIRole 2 mg Oral Nightly   sertraline 100 mg Oral Daily   sodium chloride 3 mL Intravenous Q12H   tiotropium bromide monohydrate 2 puff Inhalation Daily - RT     IV meds:                           Data Review:   I reviewed  the patient's medications and new clinical results.  Lab Results   Component Value Date    CALCIUM 10.1 06/30/2019    PHOS 4.0 08/10/2017    MG 1.9 06/04/2019    MG 2.0 08/10/2017    MG 2.1 11/14/2016     Results from last 7 days   Lab Units 07/01/19  0527 06/30/19  0638   SODIUM mmol/L  --  137   POTASSIUM mmol/L  --  4.4   CHLORIDE mmol/L  --  98   CO2 mmol/L  --  27.3   BUN mg/dL  --  12   CREATININE mg/dL  --  0.89   CALCIUM mg/dL  --  10.1   BILIRUBIN mg/dL  --  1.1   ALK PHOS U/L  --  87   ALT (SGPT) U/L  --  12   AST (SGOT) U/L  --  15   GLUCOSE mg/dL  --  116*   WBC 10*3/mm3  --  13.30*   HEMOGLOBIN g/dL  --  11.5*   PLATELETS 10*3/mm3  --  179   PROBNP pg/mL 2,540.0* 3,271.0*   PROCALCITONIN ng/mL  --  0.05*     Results from last 7 days   Lab Units 06/30/19  1922 06/30/19  0736   BLOODCX   --  No growth at 24 hours  No growth at 24 hours   RESPCX  Light growth (2+) Normal Respiratory Lesly  --      Results from last 7 days   Lab Units 06/30/19  1710   ADENOVIRUS DETECTION BY PCR  Not Detected   CORONAVIRUS 229E  Not Detected   CORONAVIRUS HKU1  Not Detected   CORONAVIRUS NL63  Not Detected   CORONAVIRUS OC43  Not Detected   HUMAN METAPNEUMOVIRUS  Not Detected   HUMAN RHINOVIRUS/ENTEROVIRUS  Not Detected   INFLUENZA B PCR  Not Detected   PARAINFLUENZA 1  Not Detected   PARAINFLUENZA VIRUS 2  Not Detected   PARAINFLUENZA VIRUS 3  Not Detected   PARAINFLUENZA VIRUS 4  Not Detected   BORDETELLA PERTUSSIS PCR  Not Detected   CHLAMYDOPHILA PNEUMONIAE PCR  Not Detected   MYCOPLAMA PNEUMO PCR  Not Detected   INFLUENZA A PCR  Not Detected   INFLUENZA A H3  Not Detected   INFLUENZA A H1  Not Detected   RSV, PCR  Not Detected       Results from last 7 days   Lab Units 06/30/19  0638   TROPONIN T ng/mL <0.010           Estimated Creatinine Clearance: 59.2 mL/min (by C-G formula based on SCr of 0.89 mg/dL).      ASSESSMENT:     Chronic respiratory failure with hypoxia (CMS/HCC)    COPD exacerbation (CMS/HCC)     Abnormal EKG      PLAN:  She has some wheezing on exam.  So far no evidence of bacterial or viral infection of the airways.  Continue supportive care with oxygen, nebulized beta agonist bronchodilators and supportive care.  Continue diuretic for elevated proBNP.  Echocardiogram is pending.  Discharge home tomorrow        David Gage MD  7/1/2019

## 2019-07-02 ENCOUNTER — APPOINTMENT (OUTPATIENT)
Dept: CT IMAGING | Facility: HOSPITAL | Age: 64
End: 2019-07-02

## 2019-07-02 ENCOUNTER — APPOINTMENT (OUTPATIENT)
Dept: CARDIOLOGY | Facility: HOSPITAL | Age: 64
End: 2019-07-02

## 2019-07-02 LAB
AORTIC DIMENSIONLESS INDEX: 0.7 (DI)
BACTERIA SPEC RESP CULT: ABNORMAL
BACTERIA SPEC RESP CULT: ABNORMAL
BH CV ECHO MEAS - ACS: 1.6 CM
BH CV ECHO MEAS - AO MAX PG: 14 MMHG
BH CV ECHO MEAS - AO MEAN PG (FULL): 4 MMHG
BH CV ECHO MEAS - AO MEAN PG: 7 MMHG
BH CV ECHO MEAS - AO ROOT AREA (BSA CORRECTED): 1.2
BH CV ECHO MEAS - AO ROOT AREA: 3.5 CM^2
BH CV ECHO MEAS - AO ROOT DIAM: 2.1 CM
BH CV ECHO MEAS - AO V2 MAX: 184 CM/SEC
BH CV ECHO MEAS - AO V2 MEAN: 124 CM/SEC
BH CV ECHO MEAS - AO V2 VTI: 37.8 CM
BH CV ECHO MEAS - AVA(I,A): 1.5 CM^2
BH CV ECHO MEAS - AVA(I,D): 1.5 CM^2
BH CV ECHO MEAS - BSA(HAYCOCK): 1.7 M^2
BH CV ECHO MEAS - BSA: 1.7 M^2
BH CV ECHO MEAS - BZI_BMI: 21 KILOGRAMS/M^2
BH CV ECHO MEAS - BZI_METRIC_HEIGHT: 170.2 CM
BH CV ECHO MEAS - BZI_METRIC_WEIGHT: 60.8 KG
BH CV ECHO MEAS - EDV(CUBED): 125 ML
BH CV ECHO MEAS - EDV(MOD-SP2): 77 ML
BH CV ECHO MEAS - EDV(MOD-SP4): 79 ML
BH CV ECHO MEAS - EDV(TEICH): 118.2 ML
BH CV ECHO MEAS - EF(CUBED): 59.5 %
BH CV ECHO MEAS - EF(MOD-BP): 63 %
BH CV ECHO MEAS - EF(MOD-SP2): 61 %
BH CV ECHO MEAS - EF(MOD-SP4): 63.3 %
BH CV ECHO MEAS - EF(TEICH): 50.8 %
BH CV ECHO MEAS - ESV(CUBED): 50.7 ML
BH CV ECHO MEAS - ESV(MOD-SP2): 30 ML
BH CV ECHO MEAS - ESV(MOD-SP4): 29 ML
BH CV ECHO MEAS - ESV(TEICH): 58.1 ML
BH CV ECHO MEAS - FS: 26 %
BH CV ECHO MEAS - IVS/LVPW: 1.1
BH CV ECHO MEAS - IVSD: 1 CM
BH CV ECHO MEAS - LA DIMENSION: 4.1 CM
BH CV ECHO MEAS - LA/AO: 2
BH CV ECHO MEAS - LAT PEAK E' VEL: 9 CM/SEC
BH CV ECHO MEAS - LV DIASTOLIC VOL/BSA (35-75): 46.3 ML/M^2
BH CV ECHO MEAS - LV MASS(C)D: 169.9 GRAMS
BH CV ECHO MEAS - LV MASS(C)DI: 99.6 GRAMS/M^2
BH CV ECHO MEAS - LV MEAN PG: 3 MMHG
BH CV ECHO MEAS - LV SYSTOLIC VOL/BSA (12-30): 17 ML/M^2
BH CV ECHO MEAS - LV V1 MAX: 123 CM/SEC
BH CV ECHO MEAS - LV V1 MEAN: 89.3 CM/SEC
BH CV ECHO MEAS - LV V1 VTI: 25 CM
BH CV ECHO MEAS - LVIDD: 5 CM
BH CV ECHO MEAS - LVIDS: 3.7 CM
BH CV ECHO MEAS - LVLD AP2: 7.2 CM
BH CV ECHO MEAS - LVLD AP4: 7.4 CM
BH CV ECHO MEAS - LVLS AP2: 5.8 CM
BH CV ECHO MEAS - LVLS AP4: 6 CM
BH CV ECHO MEAS - LVOT AREA (M): 2.3 CM^2
BH CV ECHO MEAS - LVOT AREA: 2.3 CM^2
BH CV ECHO MEAS - LVOT DIAM: 1.7 CM
BH CV ECHO MEAS - LVPWD: 0.9 CM
BH CV ECHO MEAS - MED PEAK E' VEL: 6 CM/SEC
BH CV ECHO MEAS - MV A DUR: 0.11 SEC
BH CV ECHO MEAS - MV A MAX VEL: 45.7 CM/SEC
BH CV ECHO MEAS - MV DEC SLOPE: 800 CM/SEC^2
BH CV ECHO MEAS - MV DEC TIME: 0.21 SEC
BH CV ECHO MEAS - MV E MAX VEL: 150 CM/SEC
BH CV ECHO MEAS - MV E/A: 3.3
BH CV ECHO MEAS - MV MEAN PG: 4.3 MMHG
BH CV ECHO MEAS - MV P1/2T MAX VEL: 195 CM/SEC
BH CV ECHO MEAS - MV P1/2T: 71.4 MSEC
BH CV ECHO MEAS - MV V2 MEAN: 88.9 CM/SEC
BH CV ECHO MEAS - MV V2 VTI: 42.1 CM
BH CV ECHO MEAS - MVA P1/2T LCG: 1.1 CM^2
BH CV ECHO MEAS - MVA(P1/2T): 3.1 CM^2
BH CV ECHO MEAS - MVA(VTI): 1.3 CM^2
BH CV ECHO MEAS - PA ACC SLOPE: 891 CM/SEC^2
BH CV ECHO MEAS - PA ACC TIME: 0.12 SEC
BH CV ECHO MEAS - PA PR(ACCEL): 25 MMHG
BH CV ECHO MEAS - PULM SYS VEL: 19.7 CM/SEC
BH CV ECHO MEAS - QP/QS: 0.73
BH CV ECHO MEAS - RAP SYSTOLE: 8 MMHG
BH CV ECHO MEAS - RV MAX PG: 2 MMHG
BH CV ECHO MEAS - RV MEAN PG: 1 MMHG
BH CV ECHO MEAS - RV V1 MAX: 0.7 CM/SEC
BH CV ECHO MEAS - RV V1 MEAN: 47.5 CM/SEC
BH CV ECHO MEAS - RV V1 VTI: 14.7 CM
BH CV ECHO MEAS - RVOT AREA: 2.8 CM^2
BH CV ECHO MEAS - RVOT DIAM: 1.9 CM
BH CV ECHO MEAS - RVSP: 60.4 MMHG
BH CV ECHO MEAS - SI(AO): 76.8 ML/M^2
BH CV ECHO MEAS - SI(CUBED): 43.6 ML/M^2
BH CV ECHO MEAS - SI(LVOT): 33.3 ML/M^2
BH CV ECHO MEAS - SI(MOD-SP2): 27.6 ML/M^2
BH CV ECHO MEAS - SI(MOD-SP4): 29.3 ML/M^2
BH CV ECHO MEAS - SI(TEICH): 35.2 ML/M^2
BH CV ECHO MEAS - SV(AO): 130.9 ML
BH CV ECHO MEAS - SV(CUBED): 74.3 ML
BH CV ECHO MEAS - SV(LVOT): 56.7 ML
BH CV ECHO MEAS - SV(MOD-SP2): 47 ML
BH CV ECHO MEAS - SV(MOD-SP4): 50 ML
BH CV ECHO MEAS - SV(RVOT): 41.7 ML
BH CV ECHO MEAS - SV(TEICH): 60.1 ML
BH CV ECHO MEAS - TAPSE (>1.6): 1.2 CM2
BH CV ECHO MEAS - TR MAX VEL: 362 CM/SEC
BH CV ECHO MEASUREMENTS AVERAGE E/E' RATIO: 20
BH CV VAS BP RIGHT ARM: NORMAL MMHG
BH CV XLRA - RV BASE: 3.2 CM
BH CV XLRA - TDI S': 10 CM/SEC
GRAM STN SPEC: ABNORMAL
LEFT ATRIUM VOLUME INDEX: 23 ML/M2
LEFT ATRIUM VOLUME: 35 CM3
LV EF 2D ECHO EST: 63 %
MAXIMAL PREDICTED HEART RATE: 154 BPM
PROCALCITONIN SERPL-MCNC: 0.09 NG/ML (ref 0.1–0.25)
STRESS TARGET HR: 131 BPM

## 2019-07-02 PROCEDURE — 71250 CT THORAX DX C-: CPT

## 2019-07-02 PROCEDURE — 25010000002 VANCOMYCIN PER 500 MG: Performed by: INTERNAL MEDICINE

## 2019-07-02 PROCEDURE — 25010000002 METHYLPREDNISOLONE PER 40 MG: Performed by: INTERNAL MEDICINE

## 2019-07-02 PROCEDURE — 74176 CT ABD & PELVIS W/O CONTRAST: CPT

## 2019-07-02 PROCEDURE — 93306 TTE W/DOPPLER COMPLETE: CPT | Performed by: INTERNAL MEDICINE

## 2019-07-02 PROCEDURE — 25010000002 ONDANSETRON PER 1 MG: Performed by: INTERNAL MEDICINE

## 2019-07-02 PROCEDURE — 25010000002 VANCOMYCIN 10 G RECONSTITUTED SOLUTION: Performed by: INTERNAL MEDICINE

## 2019-07-02 PROCEDURE — 94799 UNLISTED PULMONARY SVC/PX: CPT

## 2019-07-02 PROCEDURE — 87081 CULTURE SCREEN ONLY: CPT | Performed by: INTERNAL MEDICINE

## 2019-07-02 PROCEDURE — 93306 TTE W/DOPPLER COMPLETE: CPT

## 2019-07-02 PROCEDURE — 25010000002 PIPERACILLIN SOD-TAZOBACTAM PER 1 G: Performed by: INTERNAL MEDICINE

## 2019-07-02 PROCEDURE — 99232 SBSQ HOSP IP/OBS MODERATE 35: CPT | Performed by: NURSE PRACTITIONER

## 2019-07-02 PROCEDURE — 0 DIATRIZOATE MEGLUMINE & SODIUM PER 1 ML: Performed by: INTERNAL MEDICINE

## 2019-07-02 RX ORDER — FUROSEMIDE 20 MG/1
20 TABLET ORAL NIGHTLY
Status: DISCONTINUED | OUTPATIENT
Start: 2019-07-02 | End: 2019-07-03 | Stop reason: HOSPADM

## 2019-07-02 RX ORDER — SIMETHICONE 80 MG
80 TABLET,CHEWABLE ORAL 4 TIMES DAILY PRN
Status: DISCONTINUED | OUTPATIENT
Start: 2019-07-02 | End: 2019-07-03 | Stop reason: HOSPADM

## 2019-07-02 RX ORDER — ONDANSETRON 2 MG/ML
4 INJECTION INTRAMUSCULAR; INTRAVENOUS EVERY 6 HOURS PRN
Status: DISCONTINUED | OUTPATIENT
Start: 2019-07-02 | End: 2019-07-03 | Stop reason: HOSPADM

## 2019-07-02 RX ORDER — ALBUTEROL SULFATE 2.5 MG/3ML
2.5 SOLUTION RESPIRATORY (INHALATION)
Status: DISCONTINUED | OUTPATIENT
Start: 2019-07-02 | End: 2019-07-03 | Stop reason: HOSPADM

## 2019-07-02 RX ORDER — VANCOMYCIN HYDROCHLORIDE 1 G/200ML
15 INJECTION, SOLUTION INTRAVENOUS EVERY 12 HOURS
Status: DISCONTINUED | OUTPATIENT
Start: 2019-07-03 | End: 2019-07-03 | Stop reason: HOSPADM

## 2019-07-02 RX ORDER — METHYLPREDNISOLONE SODIUM SUCCINATE 40 MG/ML
40 INJECTION, POWDER, LYOPHILIZED, FOR SOLUTION INTRAMUSCULAR; INTRAVENOUS ONCE
Status: COMPLETED | OUTPATIENT
Start: 2019-07-02 | End: 2019-07-02

## 2019-07-02 RX ORDER — VANCOMYCIN HYDROCHLORIDE 1 G/200ML
15 INJECTION, SOLUTION INTRAVENOUS EVERY 12 HOURS
Status: DISCONTINUED | OUTPATIENT
Start: 2019-07-02 | End: 2019-07-02

## 2019-07-02 RX ADMIN — ALBUTEROL SULFATE 2.5 MG: 2.5 SOLUTION RESPIRATORY (INHALATION) at 05:12

## 2019-07-02 RX ADMIN — HYDROCODONE BITARTRATE AND ACETAMINOPHEN 1 TABLET: 5; 325 TABLET ORAL at 22:10

## 2019-07-02 RX ADMIN — VANCOMYCIN HYDROCHLORIDE 1000 MG: 1 INJECTION, SOLUTION INTRAVENOUS at 12:44

## 2019-07-02 RX ADMIN — HYDROCODONE BITARTRATE AND ACETAMINOPHEN 1 TABLET: 5; 325 TABLET ORAL at 14:23

## 2019-07-02 RX ADMIN — FUROSEMIDE 40 MG: 40 TABLET ORAL at 08:51

## 2019-07-02 RX ADMIN — ASPIRIN 81 MG: 81 TABLET, COATED ORAL at 08:51

## 2019-07-02 RX ADMIN — TAZOBACTAM SODIUM AND PIPERACILLIN SODIUM 3.38 G: 375; 3 INJECTION, SOLUTION INTRAVENOUS at 22:10

## 2019-07-02 RX ADMIN — ROPINIROLE 2 MG: 2 TABLET, FILM COATED ORAL at 22:07

## 2019-07-02 RX ADMIN — TAZOBACTAM SODIUM AND PIPERACILLIN SODIUM 3.38 G: 375; 3 INJECTION, SOLUTION INTRAVENOUS at 00:46

## 2019-07-02 RX ADMIN — FERROUS SULFATE TAB 325 MG (65 MG ELEMENTAL FE) 325 MG: 325 (65 FE) TAB at 08:50

## 2019-07-02 RX ADMIN — TAZOBACTAM SODIUM AND PIPERACILLIN SODIUM 3.38 G: 375; 3 INJECTION, SOLUTION INTRAVENOUS at 06:07

## 2019-07-02 RX ADMIN — CARVEDILOL 6.25 MG: 6.25 TABLET, FILM COATED ORAL at 22:07

## 2019-07-02 RX ADMIN — LISINOPRIL 2.5 MG: 2.5 TABLET ORAL at 08:50

## 2019-07-02 RX ADMIN — VANCOMYCIN HYDROCHLORIDE 1250 MG: 10 INJECTION, POWDER, LYOPHILIZED, FOR SOLUTION INTRAVENOUS at 01:49

## 2019-07-02 RX ADMIN — HYDROCODONE BITARTRATE AND ACETAMINOPHEN 1 TABLET: 5; 325 TABLET ORAL at 17:42

## 2019-07-02 RX ADMIN — CARVEDILOL 6.25 MG: 6.25 TABLET, FILM COATED ORAL at 08:52

## 2019-07-02 RX ADMIN — SODIUM CHLORIDE, PRESERVATIVE FREE 3 ML: 5 INJECTION INTRAVENOUS at 09:39

## 2019-07-02 RX ADMIN — DIATRIZOATE MEGLUMINE AND DIATRIZOATE SODIUM 30 ML: 600; 100 SOLUTION ORAL; RECTAL at 10:14

## 2019-07-02 RX ADMIN — SERTRALINE 100 MG: 100 TABLET, FILM COATED ORAL at 08:50

## 2019-07-02 RX ADMIN — ONDANSETRON 4 MG: 2 INJECTION INTRAMUSCULAR; INTRAVENOUS at 04:24

## 2019-07-02 RX ADMIN — ACETAMINOPHEN 650 MG: 325 TABLET, FILM COATED ORAL at 22:10

## 2019-07-02 RX ADMIN — TEMAZEPAM 30 MG: 15 CAPSULE ORAL at 22:07

## 2019-07-02 RX ADMIN — ROFLUMILAST 500 MCG: 500 TABLET ORAL at 08:51

## 2019-07-02 RX ADMIN — HYDROCODONE BITARTRATE AND HOMATROPINE METHYLBROMIDE 5 ML: 5; 1.5 SOLUTION ORAL at 08:56

## 2019-07-02 RX ADMIN — TAZOBACTAM SODIUM AND PIPERACILLIN SODIUM 3.38 G: 375; 3 INJECTION, SOLUTION INTRAVENOUS at 14:24

## 2019-07-02 RX ADMIN — ALBUTEROL SULFATE 2.5 MG: 2.5 SOLUTION RESPIRATORY (INHALATION) at 19:52

## 2019-07-02 RX ADMIN — FUROSEMIDE 20 MG: 20 TABLET ORAL at 17:39

## 2019-07-02 RX ADMIN — SODIUM CHLORIDE, PRESERVATIVE FREE 3 ML: 5 INJECTION INTRAVENOUS at 22:07

## 2019-07-02 RX ADMIN — METHYLPREDNISOLONE SODIUM SUCCINATE 40 MG: 40 INJECTION, POWDER, FOR SOLUTION INTRAMUSCULAR; INTRAVENOUS at 09:39

## 2019-07-02 RX ADMIN — CYCLOBENZAPRINE 10 MG: 10 TABLET, FILM COATED ORAL at 22:07

## 2019-07-02 RX ADMIN — TIOTROPIUM BROMIDE INHALATION SPRAY 2 PUFF: 3.12 SPRAY, METERED RESPIRATORY (INHALATION) at 07:57

## 2019-07-02 RX ADMIN — ATORVASTATIN CALCIUM 40 MG: 20 TABLET, FILM COATED ORAL at 08:51

## 2019-07-02 NOTE — PLAN OF CARE
Problem: Patient Care Overview  Goal: Plan of Care Review  Outcome: Ongoing (interventions implemented as appropriate)   07/02/19 1112   Coping/Psychosocial   Plan of Care Reviewed With patient   Plan of Care Review   Progress no change   OTHER   Outcome Summary Noted temp of 101.1 increased to 101.9 after giving pt Tylenol and Norco, pt c/o feeling worse, Dr. Murray notified and new orders to start IV abx and check procal. Pt c/o intermittent nausea, prn Zofran modified to IV q 6 hours and given x1. Pt requested 2 prn MN txs for SOA, sats >94% on 3L. Will continue to monitor.     Goal: Individualization and Mutuality  Outcome: Ongoing (interventions implemented as appropriate)    Goal: Discharge Needs Assessment  Outcome: Ongoing (interventions implemented as appropriate)    Goal: Interprofessional Rounds/Family Conf  Outcome: Ongoing (interventions implemented as appropriate)      Problem: Pain, Chronic (Adult)  Goal: Acceptable Pain/Comfort Level and Functional Ability  Outcome: Ongoing (interventions implemented as appropriate)      Problem: Chronic Obstructive Pulmonary Disease (Adult)  Goal: Signs and Symptoms of Listed Potential Problems Will be Absent, Minimized or Managed (Chronic Obstructive Pulmonary Disease)  Outcome: Ongoing (interventions implemented as appropriate)

## 2019-07-02 NOTE — PROGRESS NOTES
"Pharmacokinetic Consult - Vancomycin Dosing (Initial Note)    Paz Browne has been consulted for pharmacy to dose vancomycin for fever.  Pharmacy dosing vancomycin per Dr. Luis A Murray's request.   Goal trough: 15-20 mg/L   Other antimicrobials: Zosyn    Relevant clinical data and objective history reviewed:  64 y.o. female 170.2 cm (67\") 60.3 kg (133 lb)    Past Medical History:   Diagnosis Date   • VAN (acute kidney injury) (CMS/[facility])    • Anemia    • Atrial flutter (CMS/[facility])     cardioversion   • Cataract    • Celiac artery stenosis (CMS/HCC)    • Chronic respiratory failure with hypoxia (CMS/[facility])    • Colon polyp    • COPD (chronic obstructive pulmonary disease) (CMS/[facility])    • GI bleed    • Hiatal hernia    • History of CHF (congestive heart failure)     due to MR   • History of mitral valve replacement with tissue graft    • Hyperlipidemia    • Hypertension    • Intertrigo    • Long term (current) use of anticoagulants    • Mitral regurgitation     s/p tissue MVR   • PAF (paroxysmal atrial fibrillation) (CMS/[facility])     s/p MAZE   • Pneumonia    • Pulmonary hypertension (CMS/[facility])    • S/P TVR (tricuspid valve repair) 7/7/2016     Creatinine   Date Value Ref Range Status   06/30/2019 0.89 0.57 - 1.00 mg/dL Final   06/04/2019 0.91 0.57 - 1.00 mg/dL Final   05/11/2019 0.90 0.57 - 1.00 mg/dL Final     BUN   Date Value Ref Range Status   06/30/2019 12 8 - 23 mg/dL Final     Estimated Creatinine Clearance: 59.2 mL/min (by C-G formula based on SCr of 0.89 mg/dL).    Lab Results   Component Value Date    WBC 13.30 (H) 06/30/2019     Temp Readings from Last 3 Encounters:   07/01/19 99.9 °F (37.7 °C) (Oral)   06/04/19 98.9 °F (37.2 °C)   05/11/19 98.8 °F (37.1 °C) (Oral)        Assessment/Plan  1) Will start vancomycin 1250 mg IV now, followed by 1000 mg iv q12h.     2) Vancomycin level on 7/3/19 at 1130.    3) Will monitor serum creatinine every 24 hours for the first 3 days then at least every 48 hours per dosing " recommendations.      4) Pharmacy will continue to follow daily while on vancomycin and adjust as needed.     Sincerely,  Chris Herrera RPH

## 2019-07-02 NOTE — PROGRESS NOTES
"CC: MVR follow up    Interval History: antibiotics started overnight for fever. 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 Procalcitonin is unremarkable.      Vital Signs  Temp:  [97.5 °F (36.4 °C)-101.9 °F (38.8 °C)] 97.5 °F (36.4 °C)  Heart Rate:  [] 76  Resp:  [16-18] 18  BP: (126-183)/(52-97) 129/52    Intake/Output Summary (Last 24 hours) at 7/2/2019 0842  Last data filed at 7/2/2019 0354  Gross per 24 hour   Intake 270 ml   Output 150 ml   Net 120 ml     Flowsheet Rows      First Filed Value   Admission Height  170.2 cm (67\") Documented at 06/30/2019 0557   Admission Weight  62.1 kg (137 lb) Documented at 06/30/2019 0632          PHYSICAL EXAM:  General: No acute distress  Resp:NL Rate, unlabored, scattered   CV:NL rate and rhythm, NL PMI, Nl S1 and S2, II/VI Murmur, no gallop, no rub, No JVD. Normal pedal pulses  ABD:Nl sounds, no masses or tenderness, nondistended, no guarding or rebound  Neuro: alert,cooperative and oriented  Extr: No edema or cyanosis, moves all extremities      Results Review:    Results from last 7 days   Lab Units 06/30/19  0638   SODIUM mmol/L 137   POTASSIUM mmol/L 4.4   CHLORIDE mmol/L 98   CO2 mmol/L 27.3   BUN mg/dL 12   CREATININE mg/dL 0.89   GLUCOSE mg/dL 116*   CALCIUM mg/dL 10.1     Results from last 7 days   Lab Units 06/30/19  0638   TROPONIN T ng/mL <0.010     Results from last 7 days   Lab Units 06/30/19  0638   WBC 10*3/mm3 13.30*   HEMOGLOBIN g/dL 11.5*   HEMATOCRIT % 36.0   PLATELETS 10*3/mm3 179                     I reviewed the patient's new clinical results.  I personally viewed and interpreted the patient's EKG/Telemetry data- NSR        Medication Review:   Meds reviewed         Assessment/Plan    1.  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  Per pulmonary. Continue home diuretic for elevated proBNP which is lasix 40 am and 20 mg pm. Follow renal function  2.  Abnormal ECG do not believe its much " difference in her previous troponin negative echocardiogram pending completion.  3.  Previous mitral valve replacement  08/2014 with tissue valve tricuspid valve repair for severe tricuspid and mitral insufficiency.  4.  Paroxysmal atrial fibrillation/flutter.  Status post atrial appendage closure and cryo-Maze procedure been remaining in sinus rhythm.  5.  Hypertension follow blood pressure.  6.  Hyperlipidemia continue home therapy.  7.  History of renal insufficiency.  8. Pulmonary hypertension- no response to vasodilator challenge on cath 06/2016      Still awaiting completion of echocardiogram. BMP in am       Melanie Sykes, BA  07/02/19  8:42 AM

## 2019-07-02 NOTE — PROGRESS NOTES
"Dr. ANGELA Gage    [address]    7/2/2019    Patient ID:  Name:  Paz Browne  MRN:  [id number]  1955  64 y.o.  female            CC/Reason for visit: Fever, shortness of breath    Interval hx: Patient feels worse.  Now developed fever last night feels worse. Developed fever last night of 100.9. Now more SOA and coughing and has some nausea and chest pain    ROS: No diarrhea, no abdominal pain.    Vitals:  Vitals:    07/02/19 0718 07/02/19 0757 07/02/19 1213 07/02/19 1315   BP: 129/52   114/69   BP Location: Left arm   Right arm   Patient Position: Sitting   Lying   Pulse: 76   90   Resp: 18 18  16   Temp: 97.5 °F (36.4 °C)   98 °F (36.7 °C)   TempSrc: Oral   Oral   SpO2: 97%   99%   Weight:   60.8 kg (134 lb)    Height:   170.2 cm (67\")            Body mass index is 20.99 kg/m².    Intake/Output Summary (Last 24 hours) at 7/2/2019 1530  Last data filed at 7/2/2019 1244  Gross per 24 hour   Intake 260 ml   Output 150 ml   Net 110 ml       Exam:  GEN:    Alert, oriented x 3.   LUNGS: Diminished breath sounds bilaterally, a few wheezes scattered bilaterally, no use of accessory muscles  CV:  Normal S1S2, without murmur, no edema  ABD:  Non tender, no enlarged liver or masses      Scheduled meds:    aspirin 81 mg Oral Daily   atorvastatin 40 mg Oral Daily   carvedilol 6.25 mg Oral BID With Meals   cyclobenzaprine 10 mg Oral Nightly   ferrous sulfate 325 mg Oral Every Other Day   furosemide 20 mg Oral Nightly   furosemide 40 mg Oral QAM AC   lisinopril 2.5 mg Oral Q24H   Pharmacy to dose vancomycin  Does not apply Daily   piperacillin-tazobactam 3.375 g Intravenous Q8H   roflumilast 500 mcg Oral Daily   rOPINIRole 2 mg Oral Nightly   sertraline 100 mg Oral Daily   sodium chloride 3 mL Intravenous Q12H   tiotropium bromide monohydrate 2 puff Inhalation Daily - RT   vancomycin 15 mg/kg Intravenous Q12H     IV meds:                           Data Review:   I reviewed the patient's medications " and new clinical results.  Lab Results   Component Value Date    CALCIUM 10.1 06/30/2019    PHOS 4.0 08/10/2017    MG 1.9 06/04/2019    MG 2.0 08/10/2017    MG 2.1 11/14/2016     Results from last 7 days   Lab Units 07/01/19  2343 07/01/19  0527 06/30/19  0638   SODIUM mmol/L  --   --  137   POTASSIUM mmol/L  --   --  4.4   CHLORIDE mmol/L  --   --  98   CO2 mmol/L  --   --  27.3   BUN mg/dL  --   --  12   CREATININE mg/dL  --   --  0.89   CALCIUM mg/dL  --   --  10.1   BILIRUBIN mg/dL  --   --  1.1   ALK PHOS U/L  --   --  87   ALT (SGPT) U/L  --   --  12   AST (SGOT) U/L  --   --  15   GLUCOSE mg/dL  --   --  116*   WBC 10*3/mm3  --   --  13.30*   HEMOGLOBIN g/dL  --   --  11.5*   PLATELETS 10*3/mm3  --   --  179   PROBNP pg/mL  --  2,540.0* 3,271.0*   PROCALCITONIN ng/mL 0.09*  --  0.05*     Results from last 7 days   Lab Units 06/30/19  1922 06/30/19  0736   BLOODCX   --  No growth at 2 days  No growth at 2 days   RESPCX  Heavy growth (4+) Haemophilus influenzae*  Light growth (2+) Normal Respiratory Lesly  --          ASSESSMENT:   COPD exacerbation  Sepsis  Pneumonia due to Haemophilus  Right apex nodule, suspicious for malignancy.  Shortness of breath  Severe dyspnea      PLAN:  We will add some IV steroids for the wheezing.  Sputum is growing Haemophilus influenza heavy growth, which is most likely responsible for her COPD exacerbation as well as pneumonia.  We will continue intravenous Zosyn and vancomycin for now.  Sent MRSA swab for screening.  We can start oral doxycycline tomorrow if she tolerates.  Stop vancomycin if MRSA swab is negative  I will inform the patient about suspicious lung nodule in the right apex.  Doubt we can offer any tissue diagnosis since chance for pneumothorax is extremely high because of severe emphysema        David Gage MD  7/2/2019

## 2019-07-02 NOTE — PLAN OF CARE
Problem: Patient Care Overview  Goal: Plan of Care Review   07/02/19 1423 07/02/19 1358   Coping/Psychosocial   Plan of Care Reviewed With patient;family --    Plan of Care Review   Progress --  improving   OTHER   Outcome Summary --  Pt had CT and ECHO on 7/2. Family requested steriods, pt was given solu-medrol. VSS.Lung sounds congested, w productive cough. Sputum culture growing Haemophilus influenza, sent Nare culture for MRSA, if negative will DC vanc. Pt request for additional albuterol tx (tid).      Goal: Individualization and Mutuality  Outcome: Ongoing (interventions implemented as appropriate)    Goal: Discharge Needs Assessment  Outcome: Ongoing (interventions implemented as appropriate)    Goal: Interprofessional Rounds/Family Conf  Outcome: Ongoing (interventions implemented as appropriate)      Problem: Pain, Chronic (Adult)  Goal: Acceptable Pain/Comfort Level and Functional Ability  Outcome: Ongoing (interventions implemented as appropriate)      Problem: Chronic Obstructive Pulmonary Disease (Adult)  Goal: Signs and Symptoms of Listed Potential Problems Will be Absent, Minimized or Managed (Chronic Obstructive Pulmonary Disease)  Outcome: Ongoing (interventions implemented as appropriate)

## 2019-07-03 VITALS
TEMPERATURE: 97.8 F | HEIGHT: 67 IN | BODY MASS INDEX: 21.28 KG/M2 | RESPIRATION RATE: 18 BRPM | WEIGHT: 135.6 LBS | OXYGEN SATURATION: 97 % | HEART RATE: 70 BPM | DIASTOLIC BLOOD PRESSURE: 59 MMHG | SYSTOLIC BLOOD PRESSURE: 125 MMHG

## 2019-07-03 LAB
ANION GAP SERPL CALCULATED.3IONS-SCNC: 7.6 MMOL/L (ref 5–15)
BUN BLD-MCNC: 19 MG/DL (ref 8–23)
BUN/CREAT SERPL: 20.9 (ref 7–25)
CALCIUM SPEC-SCNC: 9.5 MG/DL (ref 8.6–10.5)
CHLORIDE SERPL-SCNC: 99 MMOL/L (ref 98–107)
CO2 SERPL-SCNC: 32.4 MMOL/L (ref 22–29)
CREAT BLD-MCNC: 0.91 MG/DL (ref 0.57–1)
GFR SERPL CREATININE-BSD FRML MDRD: 62 ML/MIN/1.73
GLUCOSE BLD-MCNC: 116 MG/DL (ref 65–99)
MAGNESIUM SERPL-MCNC: 2.1 MG/DL (ref 1.6–2.4)
POTASSIUM BLD-SCNC: 4.6 MMOL/L (ref 3.5–5.2)
SODIUM BLD-SCNC: 139 MMOL/L (ref 136–145)

## 2019-07-03 PROCEDURE — 25010000002 PIPERACILLIN SOD-TAZOBACTAM PER 1 G: Performed by: INTERNAL MEDICINE

## 2019-07-03 PROCEDURE — 80048 BASIC METABOLIC PNL TOTAL CA: CPT | Performed by: NURSE PRACTITIONER

## 2019-07-03 PROCEDURE — 25010000002 VANCOMYCIN PER 500 MG: Performed by: INTERNAL MEDICINE

## 2019-07-03 PROCEDURE — 83735 ASSAY OF MAGNESIUM: CPT | Performed by: INTERNAL MEDICINE

## 2019-07-03 PROCEDURE — 94799 UNLISTED PULMONARY SVC/PX: CPT

## 2019-07-03 PROCEDURE — 99232 SBSQ HOSP IP/OBS MODERATE 35: CPT | Performed by: INTERNAL MEDICINE

## 2019-07-03 RX ORDER — CEFDINIR 300 MG/1
300 CAPSULE ORAL 2 TIMES DAILY
Qty: 10 CAPSULE | Refills: 0 | Status: SHIPPED | OUTPATIENT
Start: 2019-07-03 | End: 2019-07-08

## 2019-07-03 RX ADMIN — CARVEDILOL 6.25 MG: 6.25 TABLET, FILM COATED ORAL at 08:18

## 2019-07-03 RX ADMIN — SIMETHICONE CHEW TAB 80 MG 80 MG: 80 TABLET ORAL at 00:51

## 2019-07-03 RX ADMIN — HYDROCODONE BITARTRATE AND ACETAMINOPHEN 1 TABLET: 5; 325 TABLET ORAL at 03:57

## 2019-07-03 RX ADMIN — ALBUTEROL SULFATE 2.5 MG: 2.5 SOLUTION RESPIRATORY (INHALATION) at 12:07

## 2019-07-03 RX ADMIN — HYDROCODONE BITARTRATE AND ACETAMINOPHEN 1 TABLET: 5; 325 TABLET ORAL at 14:28

## 2019-07-03 RX ADMIN — HYDROCODONE BITARTRATE AND ACETAMINOPHEN 1 TABLET: 5; 325 TABLET ORAL at 08:51

## 2019-07-03 RX ADMIN — ALBUTEROL SULFATE 2.5 MG: 2.5 SOLUTION RESPIRATORY (INHALATION) at 00:10

## 2019-07-03 RX ADMIN — ASPIRIN 81 MG: 81 TABLET, COATED ORAL at 08:17

## 2019-07-03 RX ADMIN — SERTRALINE 100 MG: 100 TABLET, FILM COATED ORAL at 08:17

## 2019-07-03 RX ADMIN — SODIUM CHLORIDE, PRESERVATIVE FREE 3 ML: 5 INJECTION INTRAVENOUS at 08:18

## 2019-07-03 RX ADMIN — TAZOBACTAM SODIUM AND PIPERACILLIN SODIUM 3.38 G: 375; 3 INJECTION, SOLUTION INTRAVENOUS at 05:40

## 2019-07-03 RX ADMIN — LISINOPRIL 2.5 MG: 2.5 TABLET ORAL at 08:17

## 2019-07-03 RX ADMIN — ALBUTEROL SULFATE 2.5 MG: 2.5 SOLUTION RESPIRATORY (INHALATION) at 06:49

## 2019-07-03 RX ADMIN — TIOTROPIUM BROMIDE INHALATION SPRAY 2 PUFF: 3.12 SPRAY, METERED RESPIRATORY (INHALATION) at 06:49

## 2019-07-03 RX ADMIN — VANCOMYCIN HYDROCHLORIDE 1000 MG: 1 INJECTION, SOLUTION INTRAVENOUS at 00:51

## 2019-07-03 RX ADMIN — ATORVASTATIN CALCIUM 40 MG: 20 TABLET, FILM COATED ORAL at 08:17

## 2019-07-03 RX ADMIN — ROFLUMILAST 500 MCG: 500 TABLET ORAL at 08:18

## 2019-07-03 RX ADMIN — FUROSEMIDE 40 MG: 40 TABLET ORAL at 08:18

## 2019-07-03 RX ADMIN — VANCOMYCIN HYDROCHLORIDE 1000 MG: 1 INJECTION, SOLUTION INTRAVENOUS at 12:00

## 2019-07-03 NOTE — PROGRESS NOTES
"CC: MVR    Interval History:   She feels better.  Anxious to go home.    Vital Signs  Temp:  [97.4 °F (36.3 °C)-98.2 °F (36.8 °C)] 97.4 °F (36.3 °C)  Heart Rate:  [59-90] 59  Resp:  [16-18] 16  BP: (110-115)/(57-97) 115/57    Intake/Output Summary (Last 24 hours) at 7/3/2019 0751  Last data filed at 7/3/2019 0541  Gross per 24 hour   Intake 680 ml   Output 450 ml   Net 230 ml     Flowsheet Rows      First Filed Value   Admission Height  170.2 cm (67\") Documented at 06/30/2019 0557   Admission Weight  62.1 kg (137 lb) Documented at 06/30/2019 0632          PHYSICAL EXAM:  General: No acute distress  Resp:NL Rate, unlabored, scattered wheezing and rhonchi  CV:NL rate and rhythm, NL PMI, Nl S1 and S2, 2/6 systolic murmur, no gallop, no rub, No JVD. Normal pedal pulses  ABD:Nl sounds, no masses or tenderness, nondistended, no guarding or rebound  Neuro: alert,cooperative and oriented  Extr: No edema or cyanosis, moves all extremities      Results Review:    Results from last 7 days   Lab Units 07/03/19  0332   SODIUM mmol/L 139   POTASSIUM mmol/L 4.6   CHLORIDE mmol/L 99   CO2 mmol/L 32.4*   BUN mg/dL 19   CREATININE mg/dL 0.91   GLUCOSE mg/dL 116*   CALCIUM mg/dL 9.5     Results from last 7 days   Lab Units 06/30/19  0638   TROPONIN T ng/mL <0.010     Results from last 7 days   Lab Units 06/30/19  0638   WBC 10*3/mm3 13.30*   HEMOGLOBIN g/dL 11.5*   HEMATOCRIT % 36.0   PLATELETS 10*3/mm3 179                     I reviewed the patient's new clinical results.  I personally viewed and interpreted the patient's EKG/Telemetry data        Medication Review:   Meds reviewed      Pharmacy to dose vancomycin        Assessment/Plan  1.  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  Per pulmonary. Continue home diuretic for elevated proBNP which is lasix 40 am and 20 mg pm. Follow renal function.  2.  Abnormal ECG do not believe its much difference in her previous troponin negative " echocardiac on this admission normal LV function.  We will see PRN.  3.  Previous mitral valve replacement  08/2014 with tissue valve tricuspid valve repair for severe tricuspid and mitral insufficiency.  Echocardiogram this admission no significant valve disease normal functioning prosthetic valve.  4.  Paroxysmal atrial fibrillation/flutter.  Status post atrial appendage closure and cryo-Maze procedure been remaining in sinus rhythm.  5.  Hypertension follow blood pressure.  6.  Hyperlipidemia continue home therapy.  7.  History of renal insufficiency.  8. Pulmonary hypertension- no response to vasodilator challenge on cath 06/2016  9.  Possible lung cancer.  Per pulmonary.            Earnest Gan MD  07/03/19  7:51 AM

## 2019-07-03 NOTE — PLAN OF CARE
Problem: Patient Care Overview  Goal: Plan of Care Review  Outcome: Ongoing (interventions implemented as appropriate)   07/03/19 0633   Coping/Psychosocial   Plan of Care Reviewed With patient   Plan of Care Review   Progress improving   OTHER   Outcome Summary VSS. Pt continues on IV abx and reports some improvement, continues with cough and expelling some thick green/yellow phlegm. Prn [place] x2. Pt c/o gas pain, new order for Simethicone, given with relief. No distress noted, continue to monitor.      Goal: Individualization and Mutuality  Outcome: Ongoing (interventions implemented as appropriate)    Goal: Discharge Needs Assessment  Outcome: Ongoing (interventions implemented as appropriate)    Goal: Interprofessional Rounds/Family Conf  Outcome: Ongoing (interventions implemented as appropriate)      Problem: Pain, Chronic (Adult)  Goal: Acceptable Pain/Comfort Level and Functional Ability  Outcome: Ongoing (interventions implemented as appropriate)      Problem: Chronic Obstructive Pulmonary Disease (Adult)  Goal: Signs and Symptoms of Listed Potential Problems Will be Absent, Minimized or Managed (Chronic Obstructive Pulmonary Disease)  Outcome: Ongoing (interventions implemented as appropriate)

## 2019-07-03 NOTE — DISCHARGE SUMMARY
Patient Identification:  Name: Paz Browne  Age: 64 y.o.  Sex: female  :  1955  MRN: [id number]  Primary Care Physician: Javan Martinez MD    Admit date: 2019  Discharge date and time: July 3, 2019  Discharged Condition: good    Discharge Diagnoses:    Chronic respiratory failure with hypoxia (CMS/HCC)    COPD exacerbation (CMS/HCC)    Abnormal EKG  Sepsis  Haemophilus pneumonia  Right apex lung nodule, suspicious for malignancy      Hospital Course: Paz Browne presented to [facility] with cough, shortness of breath and fever.  Initially she was admitted for observation.  She did not have fever until her third day of hospital stay.  Abnormal EKG showed some possible T changes in the lateral leads which were evaluated by cardiology.  They were not deemed to be significant.    Her sputum grew 4+ Haemophilus influenza and she had bronchospasm on exam.  She was treated for COPD exacerbation initially with nebulized bronchodilators.  On the third day she received some steroids and CT scan was ordered.  CT scan revealed pneumonia.  She was started on IV Zosyn.  48 hours later she did quite well and improved significantly.    CT scan chest also revealed suspicious right upper lobe nodule, spiculated.  Radiologist suggested possible malignancy.  I had a long conversation about risks versus benefits of CT-guided lung biopsy with her and her children.  She has opted not to have a biopsy done at this time because of high risk of pneumothorax due to her severe emphysema.  She would like to discuss this with her regular pulmonologist, my colleague, Dr. Melo in the office next week.    She has maximally benefited from inpatient care and is being discharged home on Omnicef for an additional 5 days of antibiotics to complete a total of 7 days.      Consults:   IP CONSULT TO CARDIOLOGY  IP CONSULT TO PULMONOLOGY    Significant Diagnostic Studies:   Imaging Results (most recent)      Procedure Component Value Units Date/Time    CT Chest Without Contrast [[id number]] Collected:  07/02/19 1303     Updated:  07/03/19 1201    Narrative:       CT CHEST, ABDOMEN AND PELVIS WITHOUT CONTRAST     HISTORY: Shortness of breath. Generalized abdominal pain with fever of  unknown origin. Patient has history of COPD and CHF.     TECHNIQUE: Axial CT images of the chest abdomen and pelvis were obtained  without administration of intravenous and oral contrast. Coronal and  sagittal reconstructions were then obtained.     COMPARISON: CT chest from 10/14/2016.     FINDINGS:     CT CHEST: The central airways are patent. Dependent secretions are seen  adherent within the trachea. There are extensive background severe  emphysematous changes present. Bilateral lung fields demonstrate  scattered areas of groundglass density and patchy consolidation,  particularly posteriorly within the left upper lobe and superior segment  of the left lower lobe. These findings are most consistent with  bronchopneumonia. Prominent subpleural area of consolidation is also  seen within the left lung base on image 90. In addition there is a  spiculated irregular opacity seen within the right apical region  measuring approximately 1.8 x 1.3 cm best demonstrated on axial image 17  and coronal image 105. This would be most concerning for a primary lung  mass. No pleural or pericardial effusion is seen. A precarinal lymph  node measures up to 1 cm, unchanged from prior imaging in 2016.  Calcified plaque is seen within the thoracic aorta.     No pathological axillary lymphadenopathy.     CT ABDOMEN AND PELVIS:The liver demonstrates normal noncontrast  attenuation. Gallbladder is surgically absent. Numerous punctate  calcified granulomas are seen within the spleen.. The spleen is normal  in size. The pancreas is normal without ductal dilatation. Bilateral  adrenal glands and kidneys are normal. The urinary bladder is partially  distended.   There is a right-sided obturator hernia containing a portion  of the bladder lumen. The uterus is not identified and is likely  surgically absent. The small and large bowel loops demonstrate normal  caliber. No pathological retroperitoneal lymphadenopathy. Calcified  plaque is seen within the abdominal aorta and its branches.       Impression:       1. Advanced emphysema. Scattered areas of infiltrates and patchy areas  of consolidation bilaterally, most consistent with bronchopneumonia.  There is an additional irregular nodular density at the right apex that  could represent a primary bronchogenic malignancy.  At this time, I  would recommend follow-up with chest CT at short interval following  treatment in 6-8 weeks to reevaluate.  2. No acute abnormality within the abdomen or pelvis.  3. Additional findings as above.     Radiation dose reduction techniques were utilized, including automated  exposure control and exposure modulation based on body size.     This report was finalized on 7/3/2019 11:57 AM by Dr. Nellie Landaverde M.D.       CT Abdomen Pelvis Without Contrast [[id number]] Collected:  07/02/19 1303     Updated:  07/03/19 1200    Narrative:       CT CHEST, ABDOMEN AND PELVIS WITHOUT CONTRAST     HISTORY: Shortness of breath. Generalized abdominal pain with fever of  unknown origin. Patient has history of COPD and CHF.     TECHNIQUE: Axial CT images of the chest abdomen and pelvis were obtained  without administration of intravenous and oral contrast. Coronal and  sagittal reconstructions were then obtained.     COMPARISON: CT chest from 10/14/2016.     FINDINGS:     CT CHEST: The central airways are patent. Dependent secretions are seen  adherent within the trachea. There are extensive background severe  emphysematous changes present. Bilateral lung fields demonstrate  scattered areas of groundglass density and patchy consolidation,  particularly posteriorly within the left upper lobe and superior  segment  of the left lower lobe. These findings are most consistent with  bronchopneumonia. Prominent subpleural area of consolidation is also  seen within the left lung base on image 90. In addition there is a  spiculated irregular opacity seen within the right apical region  measuring approximately 1.8 x 1.3 cm best demonstrated on axial image 17  and coronal image 105. This would be most concerning for a primary lung  mass. No pleural or pericardial effusion is seen. A precarinal lymph  node measures up to 1 cm, unchanged from prior imaging in 2016.  Calcified plaque is seen within the thoracic aorta.     No pathological axillary lymphadenopathy.     CT ABDOMEN AND PELVIS:The liver demonstrates normal noncontrast  attenuation. Gallbladder is surgically absent. Numerous punctate  calcified granulomas are seen within the spleen.. The spleen is normal  in size. The pancreas is normal without ductal dilatation. Bilateral  adrenal glands and kidneys are normal. The urinary bladder is partially  distended.  There is a right-sided obturator hernia containing a portion  of the bladder lumen. The uterus is not identified and is likely  surgically absent. The small and large bowel loops demonstrate normal  caliber. No pathological retroperitoneal lymphadenopathy. Calcified  plaque is seen within the abdominal aorta and its branches.       Impression:       1. Advanced emphysema. Scattered areas of infiltrates and patchy areas  of consolidation bilaterally, most consistent with bronchopneumonia.  There is an additional irregular nodular density at the right apex that  could represent a primary bronchogenic malignancy.  At this time, I  would recommend follow-up with chest CT at short interval following  treatment in 6-8 weeks to reevaluate.  2. No acute abnormality within the abdomen or pelvis.  3. Additional findings as above.     Radiation dose reduction techniques were utilized, including automated  exposure control and  exposure modulation based on body size.     This report was finalized on 7/3/2019 11:57 AM by Dr. Nellie Landaverde M.D.       XR Chest 2 View [[id number]] Collected:  06/30/19 0803     Updated:  06/30/19 0855    Narrative:       TWO-VIEW CHEST     HISTORY: COPD. Cough and fever and shortness of breath.     FINDINGS: There is severe COPD with hyperinflation. There is some  biapical parenchymal and pleural scarring and granulomatous  calcification unchanged from 05/11/2019. There is no evidence of acute  pneumonia. The heart remains top normal in size with sternal wires from  previous cardiac surgery.     This report was finalized on 6/30/2019 8:52 AM by Dr. Isaías Garza M.D.           Results from last 7 days   Lab Units 06/30/19  1710   ADENOVIRUS DETECTION BY PCR  Not Detected   CORONAVIRUS 229E  Not Detected   CORONAVIRUS HKU1  Not Detected   CORONAVIRUS NL63  Not Detected   CORONAVIRUS OC43  Not Detected   HUMAN METAPNEUMOVIRUS  Not Detected   HUMAN RHINOVIRUS/ENTEROVIRUS  Not Detected   INFLUENZA B PCR  Not Detected   PARAINFLUENZA 1  Not Detected   PARAINFLUENZA VIRUS 2  Not Detected   PARAINFLUENZA VIRUS 3  Not Detected   PARAINFLUENZA VIRUS 4  Not Detected   BORDETELLA PERTUSSIS PCR  Not Detected   CHLAMYDOPHILA PNEUMONIAE PCR  Not Detected   MYCOPLAMA PNEUMO PCR  Not Detected   INFLUENZA A PCR  Not Detected   INFLUENZA A H3  Not Detected   INFLUENZA A H1  Not Detected   RSV, PCR  Not Detected     Results from last 7 days   Lab Units 07/02/19  1553 06/30/19  1922 06/30/19  0736   BLOODCX   --   --  No growth at 3 days  No growth at 3 days   RESPCX   --  Heavy growth (4+) Haemophilus influenzae*  Light growth (2+) Normal Respiratory Lesly  --    MRSA SCREEN CX  No Methicillin Resistant Staphylococcus aureus isolated  --   --      TEST  RESULTS PENDING AT DISCHARGE   Order Current Status    Blood Culture - Blood, Arm, Right Preliminary result    Blood Culture - Blood, Hand, Right Preliminary result    MRSA  Screen Culture - Swab, Naris, Right Preliminary result          Discharge Exam:  Alert and oriented x 4, in NAD  Supple neck, midline trach  RRR, no m/r/g, no edema  Barrel chest, diminished breath sounds bilaterally with some rhonchi, no wheezing, nonlabored  No clubbing or cyanosis     Disposition:  Home    Patient Instructions:      Discharge Medications      New Medications      Instructions Start Date   cefdinir 300 MG capsule  Commonly known as:  OMNICEF   300 mg, Oral, 2 Times Daily         Changes to Medications      Instructions Start Date   furosemide 40 MG tablet  Commonly known as:  LASIX  What changed:    · how to take this  · additional instructions   40 mg, Oral, Daily      furosemide 20 MG tablet  Commonly known as:  LASIX  What changed:    · how much to take  · how to take this  · when to take this   TAKE 1 TABLET BY MOUTH EVERY DAY      Magnesium 400 MG capsule  What changed:  when to take this   400 mg, Oral, Daily      polyethylene glycol packet  Commonly known as:  MIRALAX  What changed:    · when to take this  · reasons to take this   17 g, Oral, 2 Times Daily      potassium chloride 20 MEQ CR tablet  Commonly known as:  K-DUR,KLOR-TRUPTI  What changed:    · how much to take  · how to take this  · when to take this  · additional instructions   1 p.o. 3 times daily      sucralfate 1 g tablet  Commonly known as:  CARAFATE  What changed:    · how much to take  · how to take this  · when to take this   TAKE 1 TABLET BY MOUTH BEFORE MEALS & AT BEDTIME         Continue These Medications      Instructions Start Date   albuterol sulfate  (90 Base) MCG/ACT inhaler  Commonly known as:  PROVENTIL HFA;VENTOLIN HFA;PROAIR HFA   2 puffs, Inhalation, Every 4 Hours PRN      aspirin 81 MG EC tablet   81 mg, Oral, Daily      atorvastatin 40 MG tablet  Commonly known as:  LIPITOR   40 mg, Oral, Daily      carvedilol 6.25 MG tablet  Commonly known as:  COREG   6.25 mg, Oral, 2 Times Daily With Meals       cyclobenzaprine 10 MG tablet  Commonly known as:  FLEXERIL   10 mg, Oral, Nightly      ferrous sulfate 325 (65 FE) MG tablet   TAKE 1 TABLET BY MOUTH TWICE A DAY WITH MEALS      fluticasone-salmeterol 250-50 MCG/DOSE DISKUS  Commonly known as:  ADVAIR DISKUS   1 puff, Inhalation, 2 Times Daily - RT      HYDROcodone-acetaminophen 5-325 MG per tablet  Commonly known as:  NORCO   1 tablet, Oral, Every 8 Hours PRN, Chronic pain medicine for low back pain      lisinopril 5 MG tablet  Commonly known as:  PRINIVIL,ZESTRIL   Take one Tablet by mouth Once a day      meclizine 25 MG tablet  Commonly known as:  ANTIVERT   25 mg, Oral, 3 Times Daily PRN, prn      MULTIVITAL tablet   1 tablet, Oral, Daily      omeprazole 40 MG capsule  Commonly known as:  priLOSEC   TAKE 1 CAPSULE BY MOUTH EVERY DAY      ondansetron 4 MG tablet  Commonly known as:  ZOFRAN   4 mg, Oral, Every 12 Hours PRN      roflumilast 500 MCG tablet tablet  Commonly known as:  DALIRESP   Oral, Daily      rOPINIRole 2 MG tablet  Commonly known as:  REQUIP   2 mg, Oral, Nightly      sertraline 100 MG tablet  Commonly known as:  ZOLOFT   TAKE 1 TABLET BY MOUTH EVERY DAY      temazepam 30 MG capsule  Commonly known as:  RESTORIL   30 mg, Oral, Nightly PRN      tiotropium 18 MCG per inhalation capsule  Commonly known as:  SPIRIVA HANDIHALER   1 capsule, Inhalation, Daily - RT         Stop These Medications    loratadine 10 MG tablet  Commonly known as:  CLARITIN             Your medication list      START taking these medications      Instructions Last Dose Given Next Dose Due   cefdinir 300 MG capsule  Commonly known as:  OMNICEF      Take 1 capsule by mouth 2 (Two) Times a Day for 5 days.          CHANGE how you take these medications      Instructions Last Dose Given Next Dose Due   furosemide 40 MG tablet  Commonly known as:  LASIX  What changed:    · how to take this  · additional instructions      Take 1 tablet by mouth Daily.       furosemide 20 MG  tablet  Commonly known as:  LASIX  What changed:    · how much to take  · how to take this  · when to take this      TAKE 1 TABLET BY MOUTH EVERY DAY       Magnesium 400 MG capsule  What changed:  when to take this      Take 400 mg by mouth Daily.       polyethylene glycol packet  Commonly known as:  MIRALAX  What changed:    · when to take this  · reasons to take this      Take 17 g by mouth 2 (Two) Times a Day.       potassium chloride 20 MEQ CR tablet  Commonly known as:  K-DUR,KLOR-CON  What changed:    · how much to take  · how to take this  · when to take this  · additional instructions      1 p.o. 3 times daily       sucralfate 1 g tablet  Commonly known as:  CARAFATE  What changed:    · how much to take  · how to take this  · when to take this      TAKE 1 TABLET BY MOUTH BEFORE MEALS & AT BEDTIME          CONTINUE taking these medications      Instructions Last Dose Given Next Dose Due   albuterol sulfate  (90 Base) MCG/ACT inhaler  Commonly known as:  PROVENTIL HFA;VENTOLIN HFA;PROAIR HFA      Inhale 2 puffs Every 4 (Four) Hours As Needed for Wheezing.       aspirin 81 MG EC tablet      Take 1 tablet by mouth Daily.       atorvastatin 40 MG tablet  Commonly known as:  LIPITOR      TAKE 1 TABLET BY MOUTH DAILY.       carvedilol 6.25 MG tablet  Commonly known as:  COREG      Take 1 tablet by mouth 2 (Two) Times a Day With Meals.       cyclobenzaprine 10 MG tablet  Commonly known as:  FLEXERIL      Take 10 mg by mouth Every Night.       ferrous sulfate 325 (65 FE) MG tablet      TAKE 1 TABLET BY MOUTH TWICE A DAY WITH MEALS       fluticasone-salmeterol 250-50 MCG/DOSE DISKUS  Commonly known as:  ADVAIR DISKUS      Inhale 1 puff 2 (Two) Times a Day.       HYDROcodone-acetaminophen 5-325 MG per tablet  Commonly known as:  NORCO      Take 1 tablet by mouth Every 8 (Eight) Hours As Needed for Moderate Pain . Chronic pain medicine for low back pain       lisinopril 5 MG tablet  Commonly known as:   PRINIVIL,ZESTRIL      Take one Tablet by mouth Once a day       meclizine 25 MG tablet  Commonly known as:  ANTIVERT      Take 25 mg by mouth 3 (Three) Times a Day As Needed for dizziness. prn       MULTIVITAL tablet      Take 1 tablet by mouth Daily.       omeprazole 40 MG capsule  Commonly known as:  priLOSEC      TAKE 1 CAPSULE BY MOUTH EVERY DAY       ondansetron 4 MG tablet  Commonly known as:  ZOFRAN      Take 4 mg by mouth Every 12 (Twelve) Hours As Needed for Nausea or Vomiting.       roflumilast 500 MCG tablet tablet  Commonly known as:  DALIRESP      Take  by mouth Daily.       rOPINIRole 2 MG tablet  Commonly known as:  REQUIP      TAKE 1 TABLET BY MOUTH EVERY NIGHT.       sertraline 100 MG tablet  Commonly known as:  ZOLOFT      TAKE 1 TABLET BY MOUTH EVERY DAY       temazepam 30 MG capsule  Commonly known as:  RESTORIL      Take 30 mg by mouth At Night As Needed for Sleep.       tiotropium 18 MCG per inhalation capsule  Commonly known as:  SPIRIVA HANDIHALER      Place 1 capsule into inhaler and inhale Daily.          STOP taking these medications    loratadine 10 MG tablet  Commonly known as:  CLARITIN              Where to Get Your Medications      These medications were sent to [facility]/pharmacy #[id number] - [place], [place] - [address] [place] [address] - [phone]  - [phone] [place] [address]    Hours:  24-hours Phone:  [phone]   · cefdinir 300 MG capsule             Medication Reconciliation: Please see electronically completed Med Rec.    Total time spent discharging patient including evaluation, medication reconciliation, arranging follow up, and post hospitalization instructions and education to patient and family total time exceeds 30 minutes.    Signed:  David Gage MD  7/3/2019  2:57 PM

## 2019-07-03 NOTE — PROGRESS NOTES
Continued Stay Note  Psychiatric     Patient Name: Paz Browne  MRN: [id number]  Today's Date: 7/3/2019    Admit Date: 6/30/2019    Discharge Plan     Row Name 07/03/19 1120       Plan    Plan  Reeturn home with family    Patient/Family in Agreement with Plan  yes    Plan Comments  Spoke with patient at bedside.  Patient confirms that she plans to return home at DC.  She does not feel that HH would be needed.  CCP will follow as needed.  BHumeniuk RN       Expected Discharge Date and Time     Expected Discharge Date Expected Discharge Time    Jul 5, 2019             Becky S. Humeniuk, RN

## 2019-07-04 ENCOUNTER — READMISSION MANAGEMENT (OUTPATIENT)
Dept: CALL CENTER | Facility: HOSPITAL | Age: 64
End: 2019-07-04

## 2019-07-04 NOTE — OUTREACH NOTE
Prep Survey      Responses   Facility patient discharged from?  [facility]   Is patient eligible?  Yes   Discharge diagnosis  Chronic respiratory failure with hypoxia,  COPD exacerbation,  Sepsis,  Haemophilus pneumonia   Does the patient have one of the following disease processes/diagnoses(primary or secondary)?  COPD/Pneumonia   Does the patient have Home health ordered?  No   Is there a DME ordered?  No   Prep survey completed?  Yes          Trish Vasquez RN

## 2019-07-05 LAB
BACTERIA SPEC AEROBE CULT: NORMAL
BACTERIA SPEC AEROBE CULT: NORMAL
MRSA SPEC QL CULT: NORMAL

## 2019-07-06 ENCOUNTER — READMISSION MANAGEMENT (OUTPATIENT)
Dept: CALL CENTER | Facility: HOSPITAL | Age: 64
End: 2019-07-06

## 2019-07-06 NOTE — OUTREACH NOTE
COPD/PN Week 1 Survey      Responses   Facility patient discharged from?  [facility]   Does the patient have one of the following disease processes/diagnoses(primary or secondary)?  COPD/Pneumonia   Is there a successful TCM telephone encounter documented?  No   Was the primary reason for admission:  COPD exacerbation   Week 1 attempt successful?  No   Unsuccessful attempts  Attempt 1          Jeaneth Vasquez RN

## 2019-07-08 ENCOUNTER — READMISSION MANAGEMENT (OUTPATIENT)
Dept: CALL CENTER | Facility: HOSPITAL | Age: 64
End: 2019-07-08

## 2019-07-08 RX ORDER — CARVEDILOL 6.25 MG/1
TABLET ORAL
Qty: 60 TABLET | Refills: 5 | Status: SHIPPED | OUTPATIENT
Start: 2019-07-08 | End: 2019-07-10 | Stop reason: SDUPTHER

## 2019-07-08 NOTE — OUTREACH NOTE
COPD/PN Week 1 Survey      Responses   Facility patient discharged from?  [facility]   Does the patient have one of the following disease processes/diagnoses(primary or secondary)?  COPD/Pneumonia   Is there a successful TCM telephone encounter documented?  No   Was the primary reason for admission:  COPD exacerbation   Week 1 attempt successful?  No   Unsuccessful attempts  Attempt 2          Thalia Nicholson RN

## 2019-07-10 ENCOUNTER — OFFICE VISIT (OUTPATIENT)
Dept: CARDIOLOGY | Facility: CLINIC | Age: 64
End: 2019-07-10

## 2019-07-10 VITALS
DIASTOLIC BLOOD PRESSURE: 80 MMHG | WEIGHT: 131.4 LBS | HEART RATE: 75 BPM | BODY MASS INDEX: 20.62 KG/M2 | SYSTOLIC BLOOD PRESSURE: 140 MMHG | HEIGHT: 67 IN

## 2019-07-10 DIAGNOSIS — Z98.890 S/P TVR (TRICUSPID VALVE REPAIR): ICD-10-CM

## 2019-07-10 DIAGNOSIS — I10 ESSENTIAL HYPERTENSION: ICD-10-CM

## 2019-07-10 DIAGNOSIS — Z95.2 S/P MVR (MITRAL VALVE REPLACEMENT): Primary | ICD-10-CM

## 2019-07-10 DIAGNOSIS — I27.20 PULMONARY HYPERTENSION (HCC): ICD-10-CM

## 2019-07-10 DIAGNOSIS — I48.0 PAROXYSMAL ATRIAL FIBRILLATION (HCC): ICD-10-CM

## 2019-07-10 PROCEDURE — 99213 OFFICE O/P EST LOW 20 MIN: CPT | Performed by: INTERNAL MEDICINE

## 2019-07-10 PROCEDURE — 93000 ELECTROCARDIOGRAM COMPLETE: CPT | Performed by: INTERNAL MEDICINE

## 2019-07-10 NOTE — PROGRESS NOTES
Date of Office Visit: 07/10/19  Encounter Provider: Earnest Gan MD  Place of Service: [facility] CARDIOLOGY  Patient Name: Paz Browne  :1955  Referral Provider:No ref. provider found      Chief Complaint   Patient presents with   • Follow-up     History of Present Illness   The patient is a pleasant, 64-year-old female with a history of previous valvular disease  but then had been lost to follow-up for quite some time.  She had been in the hospital  with progressive shortness of breath in 2014 and was found to be in rapid atrial  fibrillation as well as congestive heart failure.  She had an echocardiogram that showed  normal left ventricular systolic function.  The left atrium was severely dilated.  The  right ventricle was moderately enlarged.  She had severe mitral and severe tricuspid  insufficiency with severe pulmonary hypertension.  She was diuresed and rate controlled.   She then underwent transesophageal echocardiogram that confirmed that.  She then had a  cardiac catheterization, which showed no significant coronary disease but severer mitral  insufficiency.  She underwent urgent mitral valve repair using a #26 Physio-II ring  annuloplasty, which was unsuccessful and had to be replaced with a #31 mm porcine [employer]  Epic prosthesis.  She had tricuspid valve repair.  She also had right and left CryoMaze  procedure and closure of the atrial appendage.  She did well postoperatively and had some  transient renal insufficiency.  She had some junctional rhythm so she was not really  started on antiarrhythmics and was bradycardic, but she went back into atrial  fibrillation.  She was never really in sinus rhythm, so she was started on warfarin.   Since then, she then presented to our arrhythmia clinic on 10/02/2014, with palpitations,  tachycardia, and more shortness of breath.  She appeared to be in atrial flutter.  We  upped her carvedilol, but she continued  to have problems so we started her on amiodarone.  She was then adequately anticoagulated and then underwent electrocardioversion in 10/2014.  We then saw her June 2016 with progressive dyspnea.  She was admitted to the hospital.  She had an echocardiogram that showed normal left her systolic function her mitral valve replacement and tricuspid valve repair appeared normal she had marked pulmonary hypertension.  She had marked diffuse T-wave inversion in her precordial leads.  Her BNP was just mildly elevated.  She underwent cardiac catheterization which showed normal coronary arteries, right and left sided filling pressures were normal but she had moderate pulmonary hypertension.  Her wedge pressure was normal she did not respond vaso-dilators.  She was seen by Dr. Melo of pulmonary and was felt that her issues were predominantly due to her underlying lung disease.    She was admitted also in August 2017 with GI bleeding.  Status post polypectomy but also had hemorrhoids and some gastritis.  We took her off her warfarin and every time we will try to restart her hematoma and will go down again so since she remained in sinus rhythm we decided to keep her off warfarin.  She now comes in for follow-up.  She was just in the hospital few weeks ago with Haemophilus influenza pneumonia and exacerbation of her COPD and was also found to have a pulmonary mass that they did not want a biopsy because of the severity of her lung disease.  States she is getting better but still has no energy.  No real chest pain or pressure no palpitations she is on continuous oxygen obviously short of breath but no susie orthopnea or PND.      Congestive Heart Failure   Pertinent negatives include no abdominal pain, muscle weakness or nocturia.   Atrial Fibrillation   Symptoms are negative for dizziness and weakness. Past medical history includes atrial fibrillation and CHF.   Shortness of Breath   Associated symptoms include headaches.  Pertinent negatives include no abdominal pain, fever, rash or vomiting.         Past Medical History:   Diagnosis Date   • VAN (acute kidney injury) (CMS/HCC)    • Anemia    • Atrial flutter (CMS/HCC)     cardioversion   • Cataract    • Celiac artery stenosis (CMS/HCC)    • Chronic respiratory failure with hypoxia (CMS/HCC)    • Colon polyp    • COPD (chronic obstructive pulmonary disease) (CMS/HCC)    • GI bleed    • Hiatal hernia    • History of CHF (congestive heart failure)     due to MR   • History of mitral valve replacement with tissue graft    • Hyperlipidemia    • Hypertension    • Intertrigo    • Long term (current) use of anticoagulants    • Mitral regurgitation     s/p tissue MVR   • PAF (paroxysmal atrial fibrillation) (CMS/HCC)     s/p MAZE   • Pneumonia    • Pulmonary hypertension (CMS/HCC)    • S/P TVR (tricuspid valve repair) 7/7/2016         Past Surgical History:   Procedure Laterality Date   • CARDIAC CATHETERIZATION  09/01/2014    Right dominant systemt, normal coronary arteries.    • CARDIAC CATHETERIZATION Left 6/10/2016    Procedure: Cardiac catheterization;  Surgeon: Sergei Hall MD;  Location: [facility] CATH INVASIVE LOCATION;  Service:    • CARDIAC CATHETERIZATION N/A 6/10/2016    Procedure: Right Heart Cath;  Surgeon: Sergei Hall MD;  Location: [facility] CATH INVASIVE LOCATION;  Service:    • CATARACT EXTRACTION     • COLONOSCOPY     • COLONOSCOPY N/A 8/4/2017    Procedure: COLONOSCOPY TO CECUM/TI WITH POLYPECTOMY ( COLD BX);  Surgeon: Cleveland Devine MD;  Location: [facility] ENDOSCOPY;  Service:    • COLONOSCOPY N/A 8/10/2017    Procedure: COLONOSCOPY to cecum and TI with 2 clips placed at transverse;  Surgeon: Earnest PALOMO MD;  Location: [facility] ENDOSCOPY;  Service:    • COLONOSCOPY N/A 12/22/2017    Procedure: COLONOSCOPY INTO CECUM WITH COLD POLYPECTOMIES;  Surgeon: Cleveland Devine MD;  Location: [facility] ENDOSCOPY;  Service:    • ENDOSCOPY N/A 8/17/2017    Procedure: ESOPHAGOGASTRODUODENOSCOPY;   Surgeon: Porsha Ruby MD;  Location: [facility] ENDOSCOPY;  Service:    • ENDOSCOPY N/A 12/22/2017    Procedure: ESOPHAGOGASTRODUODENOSCOPY WITH BIOPSIES;  Surgeon: Cleveland Devine MD;  Location: [facility] ENDOSCOPY;  Service:    • GALLBLADDER SURGERY     • HEMORRHOIDECTOMY     • HYSTERECTOMY     • KIDNEY SURGERY  04/22/2013   • MAZE PROCEDURE     • MITRAL VALVE REPLACEMENT     • TONSILLECTOMY     • TRICUSPID VALVE REPLACEMENT           Current Outpatient Medications on File Prior to Visit   Medication Sig Dispense Refill   • albuterol (PROVENTIL HFA;VENTOLIN HFA) 108 (90 Base) MCG/ACT inhaler Inhale 2 puffs Every 4 (Four) Hours As Needed for Wheezing. 6.7 g 11   • aspirin 81 MG EC tablet Take 1 tablet by mouth Daily.     • atorvastatin (LIPITOR) 40 MG tablet TAKE 1 TABLET BY MOUTH DAILY. 90 tablet 2   • carvedilol (COREG) 6.25 MG tablet Take 1 tablet by mouth 2 (Two) Times a Day With Meals. 60 tablet 11   • cyclobenzaprine (FLEXERIL) 10 MG tablet Take 10 mg by mouth Every Night.     • ferrous sulfate 325 (65 FE) MG tablet TAKE 1 TABLET BY MOUTH TWICE A DAY WITH MEALS 180 tablet 1   • fluticasone-salmeterol (ADVAIR DISKUS) 250-50 MCG/DOSE DISKUS Inhale 1 puff 2 (Two) Times a Day. 3 each 3   • furosemide (LASIX) 40 MG tablet Take 1 tablet by mouth Daily. (Patient taking differently: 40 mg Daily. TAKE ONE TABLET in AM and 1/2 tablet in the PM) 90 tablet 1   • HYDROcodone-acetaminophen (NORCO) 5-325 MG per tablet Take 1 tablet by mouth Every 8 (Eight) Hours As Needed for Moderate Pain . Chronic pain medicine for low back pain 90 tablet 0   • lisinopril (PRINIVIL,ZESTRIL) 5 MG tablet Take one Tablet by mouth Once a day 90 tablet 3   • Magnesium 400 MG capsule Take 400 mg by mouth Daily. (Patient taking differently: Take 400 mg by mouth Every Night.) 30 capsule 3   • meclizine (ANTIVERT) 25 MG tablet Take 25 mg by mouth 3 (Three) Times a Day As Needed for dizziness. prn     • Multiple Vitamins-Minerals (MULTIVITAL) tablet  Take 1 tablet by mouth Daily.     • omeprazole (priLOSEC) 40 MG capsule TAKE 1 CAPSULE BY MOUTH EVERY DAY 90 capsule 1   • ondansetron (ZOFRAN) 4 MG tablet Take 4 mg by mouth Every 12 (Twelve) Hours As Needed for Nausea or Vomiting.     • polyethylene glycol (MIRALAX) packet Take 17 g by mouth 2 (Two) Times a Day. (Patient taking differently: Take 17 g by mouth 2 (Two) Times a Day As Needed.)     • potassium chloride (K-DUR,KLOR-CON) 20 MEQ CR tablet 1 p.o. 3 times daily (Patient taking differently: Take 20 mEq by mouth 2 (Two) Times a Day. 2 in the AM and 1 at night) 90 tablet 3   • roflumilast (DALIRESP) 500 MCG tablet tablet Take  by mouth Daily.     • rOPINIRole (REQUIP) 2 MG tablet TAKE 1 TABLET BY MOUTH EVERY NIGHT. 90 tablet 2   • sertraline (ZOLOFT) 100 MG tablet TAKE 1 TABLET BY MOUTH EVERY DAY 90 tablet 1   • temazepam (RESTORIL) 30 MG capsule Take 30 mg by mouth At Night As Needed for Sleep.     • tiotropium (SPIRIVA HANDIHALER) 18 MCG per inhalation capsule Place 1 capsule into inhaler and inhale Daily. 90 capsule 3   • [DISCONTINUED] furosemide (LASIX) 20 MG tablet TAKE 1 TABLET BY MOUTH EVERY DAY (Patient taking differently: take 1 at night) 90 tablet 1   • [DISCONTINUED] carvedilol (COREG) 6.25 MG tablet TAKE 1 TABLET BY MOUTH TWICE A DAY WITH MEALS 60 tablet 5   • [DISCONTINUED] sucralfate (CARAFATE) 1 g tablet TAKE 1 TABLET BY MOUTH BEFORE MEALS & AT BEDTIME (Patient taking differently: takes Am and PM) 120 tablet 2     No current facility-administered medications on file prior to visit.          Social History     Socioeconomic History   • Marital status:      Spouse name: Not on file   • Number of children: Not on file   • Years of education: Not on file   • Highest education level: Not on file   Tobacco Use   • Smoking status: Former Smoker     Last attempt to quit: 2007     Years since quittin.5   • Smokeless tobacco: Never Used   Substance and Sexual Activity   • Alcohol use: No      "Comment: caffiene use   • Drug use: No   • Sexual activity: Defer   Lifestyle   • Physical activity:     Days per week: Patient refused     Minutes per session: Patient refused   • Stress: Not on file       Family History   Adopted: Yes   Problem Relation Age of Onset   • No Known Problems Mother    • No Known Problems Father          Review of Systems   Constitution: Positive for malaise/fatigue. Negative for decreased appetite, diaphoresis, fever, weakness, weight gain and weight loss.   HENT: Negative for congestion, hearing loss, nosebleeds and tinnitus.    Eyes: Negative for blurred vision, double vision, vision loss in left eye, vision loss in right eye and visual disturbance.   Cardiovascular:        As noted in HPI   Respiratory:        As noted HPI   Endocrine: Negative for cold intolerance and heat intolerance.   Hematologic/Lymphatic: Negative for bleeding problem. Does not bruise/bleed easily.   Skin: Negative for color change, flushing, itching and rash.   Musculoskeletal: Negative for arthritis, back pain, joint pain, joint swelling, muscle weakness and myalgias.   Gastrointestinal: Negative for bloating, abdominal pain, constipation, diarrhea, dysphagia, heartburn, hematemesis, hematochezia, melena, nausea and vomiting.   Genitourinary: Negative for bladder incontinence, dysuria, frequency, nocturia and urgency.   Neurological: Positive for headaches. Negative for dizziness, focal weakness, light-headedness, loss of balance, numbness, paresthesias and vertigo.   Psychiatric/Behavioral: Negative for depression, memory loss and substance abuse.       Procedures      ECG 12 Lead  Date/Time: 7/10/2019 2:24 PM  Performed by: Earnest Gan MD  Authorized by: Earnest Gan MD   Comparison: compared with previous ECG   Similar to previous ECG  Rate: normal  QRS axis: normal                  Objective:    /80 (BP Location: Right arm)   Pulse 75   Ht 170.2 cm (67\")   Wt 59.6 kg (131 lb 6.4 " oz)   BMI 20.58 kg/m²        Physical Exam  Physical Exam   Constitutional: She is oriented to person, place, and time. She appears well-developed and well-nourished. No distress.   HENT:   Head: Normocephalic.   Eyes: Conjunctivae are normal. Pupils are equal, round, and reactive to light. No scleral icterus.   Neck: Normal carotid pulses, no hepatojugular reflux and no JVD present. Carotid bruit is not present. No tracheal deviation, no edema and no erythema present. No thyromegaly present.   Cardiovascular: Normal rate, regular rhythm, S1 normal, S2 normal, normal heart sounds and intact distal pulses.  No extrasystoles are present. PMI is not displaced. Exam reveals no gallop, no distant heart sounds and no friction rub.   No murmur heard.  Pulses:       Carotid pulses are 2+ on the right side, and 2+ on the left side.       Radial pulses are 2+ on the right side, and 2+ on the left side.        Femoral pulses are 2+ on the right side, and 2+ on the left side.       Dorsalis pedis pulses are 2+ on the right side, and 2+ on the left side.        Posterior tibial pulses are 2+ on the right side, and 2+ on the left side.   Pulmonary/Chest: Effort normal. No respiratory distress. She has decreased breath sounds. She has no wheezes. She has no rhonchi. She has no rales. She exhibits no tenderness.   Abdominal: Soft. Bowel sounds are normal. She exhibits no distension and no mass. There is no hepatosplenomegaly. There is no tenderness. There is no rebound and no guarding.   Musculoskeletal: She exhibits no edema, tenderness or deformity.   Neurological: She is alert and oriented to person, place, and time.   Skin: Skin is warm and dry. No rash noted. She is not diaphoretic. No cyanosis or erythema. No pallor. Nails show no clubbing.   Psychiatric: She has a normal mood and affect. Her speech is normal and behavior is normal. Judgment and thought content normal.           Assessment:    1. This patient is a  64-year-old female with a history of severe mitral and tricuspid insufficiencies, status post mitral valve replacement with a tissue valve and tricuspid  valve repair. She has had previous normal LV systolic function. Repeat echocardiogram June 2019 normal left ventricular ejection fraction, marked pulmonary hypertension, normal functioning prosthetic valve.  2. Atrial fibrillation flutter. She is status post atrial appendage closure and CryoMaze procedure.   Atrial Fibrillation and Atrial Flutter  Assessment  • The patient has paroxysmal atrial fibrillation  • This is valvular in etiology  • The patient's CHADS2-VASc score is 2  • A [id number] score of 2 or more is considered a high risk for a thromboembolic event  • Aspirin prescribed     Plan  • Attempt to maintain sinus rhythm  • Continue beta blocker for rhythm control     Subjective - Objective  • The patient underwent cardioversion   • The patient had atrial fibrillation ablation   I believe she still maintaining sinus rhythm continue the same.  She is to see our nurse practitioner in 3 months and me 6 months after that.      3. Hypertension. Her blood pressure is adequately controlled.   4. Hyperlipidemia on therapy. Continue the same.   5. Renal insufficiency.  Stable.  6. Underlying lung disease with moderate to marked pulmonary hypertension. She is following with pulmonary.   As above.  7.  GI bleeding secondary to gastritis polyp hemorrhoids.  No real recurrence off warfarin.  8.  Lung mass not sure if this is malignant but certainly suspicious she is seeing pulmonary for a follow-up CT and if needed bronchoscopy.         Plan:

## 2019-07-11 ENCOUNTER — READMISSION MANAGEMENT (OUTPATIENT)
Dept: CALL CENTER | Facility: HOSPITAL | Age: 64
End: 2019-07-11

## 2019-07-11 NOTE — OUTREACH NOTE
COPD/PN Week 2 Survey      Responses   Facility patient discharged from?  [facility]   Does the patient have one of the following disease processes/diagnoses(primary or secondary)?  COPD/Pneumonia   Was the primary reason for admission:  COPD exacerbation   Week 2 attempt successful?  No          Wendy Reeder RN

## 2019-07-12 ENCOUNTER — READMISSION MANAGEMENT (OUTPATIENT)
Dept: CALL CENTER | Facility: HOSPITAL | Age: 64
End: 2019-07-12

## 2019-07-12 NOTE — OUTREACH NOTE
COPD/PN Week 2 Survey      Responses   Facility patient discharged from?  [facility]   Does the patient have one of the following disease processes/diagnoses(primary or secondary)?  COPD/Pneumonia   Was the primary reason for admission:  COPD exacerbation   Week 2 attempt successful?  Yes   Call start time  1512   Call end time  1517   Discharge diagnosis  Chronic respiratory failure with hypoxia,  COPD exacerbation,  Sepsis,  Haemophilus pneumonia   Person spoke with today (if not patient) and relationship  [facility]   Meds reviewed with patient/caregiver?  Yes   Is the patient having any side effects they believe may be caused by any medication additions or changes?  No   Does the patient have all medications ordered at discharge?  Yes   Is the patient taking all medications as directed (includes completed medication regime)?  Yes   Does the patient have a primary care provider?   Yes   Does the patient have an appointment with their PCP or pulmonologist within 7 days of discharge?  Yes   Has the patient kept scheduled appointments due by today?  Yes   Psychosocial issues?  No   Did the patient receive a copy of their discharge instructions?  Yes   Nursing interventions  Reviewed instructions with patient   What is the patient's perception of their health status since discharge?  Improving   Nursing Interventions  Nurse provided patient education   Are the patient's immunizations up to date?   Yes   Nursing interventions  Educated on importance of maintaining up to date immunizations as advised by provider   If the patient is a current smoker, are they able to teach back resources for cessation?  -- [Not a smoker]   Is the patient/caregiver able to teach back the hierarchy of who to call/visit for symptoms/problems? PCP, Specialist, Home health nurse, Urgent Care, ED, 911  Yes   Is the patient able to teach back COPD zones?  Yes   Nursing interventions  Education provided on various zones   Patient reports what zone  on this call?  Green [place]   [facility]  Reports doing well, Breathing without shortness of breath, Usual activity and exercise level, Usual amount of phlegm/mucus without difficulty coughing up, Sleeping well, Appetite is good   [facility] interventions:  Take daily medications, Continue regular exercise/diet plan, Do not smoke, Avoid indoor/outdoor triggers, Use oxygen as prescribed   Is the patient/caregiver able to teach back signs and symptoms of worsening condition:  Fever/chills, Shortness of breath, Chest pain   Is the patient/caregiver able to teach back importance of completing antibiotic course of treatment?  Yes   Week 2 call completed?  Yes          Zulma Zuleta RN

## 2019-07-15 ENCOUNTER — TRANSCRIBE ORDERS (OUTPATIENT)
Dept: ADMINISTRATIVE | Facility: HOSPITAL | Age: 64
End: 2019-07-15

## 2019-07-15 DIAGNOSIS — R91.1 LUNG NODULE: Primary | ICD-10-CM

## 2019-07-15 RX ORDER — ALBUTEROL SULFATE 2.5 MG/3ML
SOLUTION RESPIRATORY (INHALATION)
Qty: 150 ML | Refills: 2 | Status: SHIPPED | OUTPATIENT
Start: 2019-07-15 | End: 2019-10-02 | Stop reason: SDUPTHER

## 2019-07-16 ENCOUNTER — TELEPHONE (OUTPATIENT)
Dept: FAMILY MEDICINE CLINIC | Facility: CLINIC | Age: 64
End: 2019-07-16

## 2019-07-16 RX ORDER — HYDROCODONE BITARTRATE AND ACETAMINOPHEN 5; 325 MG/1; MG/1
1 TABLET ORAL EVERY 8 HOURS PRN
Qty: 90 TABLET | Refills: 0 | Status: SHIPPED | OUTPATIENT
Start: 2019-07-16 | End: 2019-08-19 | Stop reason: SDUPTHER

## 2019-07-18 ENCOUNTER — OFFICE VISIT (OUTPATIENT)
Dept: FAMILY MEDICINE CLINIC | Facility: CLINIC | Age: 64
End: 2019-07-18

## 2019-07-18 VITALS
HEIGHT: 67 IN | DIASTOLIC BLOOD PRESSURE: 68 MMHG | TEMPERATURE: 98 F | SYSTOLIC BLOOD PRESSURE: 158 MMHG | WEIGHT: 129.2 LBS | HEART RATE: 81 BPM | OXYGEN SATURATION: 100 % | BODY MASS INDEX: 20.28 KG/M2

## 2019-07-18 DIAGNOSIS — Z00.00 MEDICARE ANNUAL WELLNESS VISIT, SUBSEQUENT: Primary | ICD-10-CM

## 2019-07-18 DIAGNOSIS — I10 ESSENTIAL HYPERTENSION: ICD-10-CM

## 2019-07-18 DIAGNOSIS — J18.9 PNEUMONIA DUE TO INFECTIOUS ORGANISM, UNSPECIFIED LATERALITY, UNSPECIFIED PART OF LUNG: ICD-10-CM

## 2019-07-18 DIAGNOSIS — E78.00 PURE HYPERCHOLESTEROLEMIA: ICD-10-CM

## 2019-07-18 PROCEDURE — 99214 OFFICE O/P EST MOD 30 MIN: CPT | Performed by: INTERNAL MEDICINE

## 2019-07-18 PROCEDURE — G0439 PPPS, SUBSEQ VISIT: HCPCS | Performed by: INTERNAL MEDICINE

## 2019-07-18 NOTE — PATIENT INSTRUCTIONS
Medicare Wellness  Personal Prevention Plan of Service     Date of Office Visit:  2019  Encounter Provider:  Javan Martinez MD  Place of Service:  [facility]  Patient Name: Paz Browne  :  1955    As part of the Medicare Wellness portion of your visit today, we are providing you with this personalized preventive plan of services (PPPS). This plan is based upon recommendations of the [place] Preventive Services Task Force (USPSTF) and the Advisory Committee on Immunization Practices (ACIP).    This lists the preventive care services that should be considered, and provides dates of when you are due. Items listed as completed are up-to-date and do not require any further intervention.    Health Maintenance   Topic Date Due   • ZOSTER VACCINE (2 of 2) 2017   • MEDICARE ANNUAL WELLNESS  2019   • INFLUENZA VACCINE  2019   • LIPID PANEL  10/23/2019   • MAMMOGRAM  2020   • PNEUMOCOCCAL VACCINES (65+ LOW/MEDIUM RISK) (2 of 2 - PPSV23) 10/30/2020   • COLONOSCOPY  2020   • TDAP/TD VACCINES (2 - Td) 2026   • HEPATITIS C SCREENING  Addressed       No orders of the defined types were placed in this encounter.      Return in about 4 weeks (around 8/15/2019), or if symptoms worsen or fail to improve, for Recheck.

## 2019-07-18 NOTE — PROGRESS NOTES
Subjective   Paz Browne is a 64 y.o. female.     History of Present Illness   Current outpatient and discharge medications have been reconciled for the patient.  Reviewed by: Javan Martinez MD  Patient is being seen for follow-up of hospital for COPD and pneumonia.  Patient's had finished her antibiotics and is stable at present time.  Her blood pressures been running 140 150/80.  Patient to check her blood pressure daily return to our clinic in 1 month.  Her lipids are being treated with diet exercise and a statin.    Dictated utilizing Dragon dictation. If there are questions or for further clarification, please contact me.  The following portions of the patient's history were reviewed and updated as appropriate: allergies, current medications, past family history, past medical history, past social history, past surgical history and problem list.    Review of Systems   Constitutional: Negative for fatigue and fever.   HENT: Positive for congestion. Negative for trouble swallowing.    Eyes: Negative for discharge and visual disturbance.   Respiratory: Negative for choking and shortness of breath.    Cardiovascular: Negative for chest pain and palpitations.   Gastrointestinal: Negative for abdominal pain and blood in stool.   Endocrine: Negative.    Genitourinary: Negative for genital sores and hematuria.   Musculoskeletal: Negative for gait problem and joint swelling.   Skin: Negative for color change, pallor, rash and wound.   Allergic/Immunologic: Positive for environmental allergies. Negative for immunocompromised state.   Neurological: Negative for facial asymmetry and speech difficulty.   Psychiatric/Behavioral: Negative for hallucinations and suicidal ideas.       Objective   Physical Exam   Constitutional: She is oriented to person, place, and time. She appears well-developed and well-nourished.   HENT:   Head: Normocephalic.   Eyes: Conjunctivae are normal. Pupils are equal, round, and reactive to  light.   Neck: Normal range of motion. Neck supple.   Cardiovascular: Normal rate, regular rhythm and normal heart sounds.   Pulmonary/Chest: Effort normal and breath sounds normal. No stridor. No respiratory distress. She has no wheezes. She has no rales. She exhibits no tenderness.   Abdominal: Soft. Bowel sounds are normal.   Musculoskeletal: Normal range of motion.   Neurological: She is alert and oriented to person, place, and time.   Skin: Skin is warm and dry.   Psychiatric: She has a normal mood and affect. Her behavior is normal. Judgment and thought content normal.   Nursing note and vitals reviewed.      Assessment/Plan   Problems Addressed this Visit        Cardiovascular and Mediastinum    Hypertension    Hyperlipidemia      Other Visit Diagnoses     Medicare annual wellness visit, subsequent    -  Primary    Pneumonia due to infectious organism, unspecified laterality, unspecified part of lung

## 2019-07-18 NOTE — PROGRESS NOTES
Initial Medicare Wellness Visit   The ABC's of the Annual Wellness Visit    Chief Complaint   Patient presents with   • f/u hosp   • Pneumonia   • spot on lung     pulm. doing biopsy if spot grows       HPI:  Paz Browne, -1955, is a 64 y.o. female who presents for an Initial Medicare Wellness Visit.    Recent Hospitalizations:  Recently treated at the following:  [facility].    Current Medical Providers:  Patient Care Team:  Javan Martniez MD as PCP - General  Javan Martinez MD as PCP - Family Medicine  [facility] Consulting Physician (Pulmonary Disease)  Cleveland Devine MD as Consulting Physician (Gastroenterology)  Earnest Gan MD as Consulting Physician (Cardiology)    Health Habits and Functional and Cognitive Screening and Depression Screening:  Functional & Cognitive Status 2018   Do you have difficulty preparing food and eating? No   Do you have difficulty bathing yourself, getting dressed or grooming yourself? No   Do you have difficulty using the toilet? No   Do you have difficulty moving around from place to place? No   Do you have trouble with steps or getting out of a bed or a chair? No   Current Diet Well Balanced Diet   Dental Exam Not up to date   Eye Exam Up to date   Exercise (times per week) 7 times per week   Current Exercise Activities Include Walking   Do you need help using the phone?  No   Are you deaf or do you have serious difficulty hearing?  No   Do you need help with transportation? No   Do you need help shopping? No   Do you need help preparing meals?  No   Do you need help with housework?  No   Do you need help with laundry? No   Do you need help taking your medications? No   Do you need help managing money? No   Have you felt unusual stress, anger or loneliness in the last month? No   Who do you live with? Spouse   If you need help, do you have trouble finding someone available to you? No   Have you been bothered in the last  four weeks by sexual problems? No   Do you have difficulty concentrating, remembering or making decisions? No       Compared to one year ago, the patient feels her physical health is the same and her mental health is the same.    Depression Screen:  PHQ-2/PHQ-9 Depression Screening 7/18/2019   Little interest or pleasure in doing things 0   Feeling down, depressed, or hopeless 0   Trouble falling or staying asleep, or sleeping too much 0   Feeling tired or having little energy 0   Poor appetite or overeating 0   Feeling bad about yourself - or that you are a failure or have let yourself or your family down 0   Trouble concentrating on things, such as reading the newspaper or watching television 0   Moving or speaking so slowly that other people could have noticed. Or the opposite - being so fidgety or restless that you have been moving around a lot more than usual 0   Thoughts that you would be better off dead, or of hurting yourself in some way 0   Total Score 0   If you checked off any problems, how difficult have these problems made it for you to do your work, take care of things at home, or get along with other people? Not difficult at all         Past Medical/Family/Social History:  The following portions of the patient's history were reviewed and updated as appropriate: allergies, current medications, past family history, past medical history, past social history, past surgical history and problem list.    Allergies   Allergen Reactions   • Bupropion Itching   • Cephalexin Itching     Tolerated piperacillin/tazobactam   • Metoprolol Itching         Current Outpatient Medications:   •  albuterol (PROVENTIL HFA;VENTOLIN HFA) 108 (90 Base) MCG/ACT inhaler, Inhale 2 puffs Every 4 (Four) Hours As Needed for Wheezing., Disp: 6.7 g, Rfl: 11  •  albuterol (PROVENTIL) (2.5 MG/3ML) 0.083% nebulizer solution, USE 1 VIAL BY NEBULIZATION EVERY 4 HOURS AS NEEDED FOR WHEEZING, Disp: 150 mL, Rfl: 2  •  aspirin 81 MG EC  tablet, Take 1 tablet by mouth Daily., Disp: , Rfl:   •  atorvastatin (LIPITOR) 40 MG tablet, TAKE 1 TABLET BY MOUTH DAILY., Disp: 90 tablet, Rfl: 2  •  carvedilol (COREG) 6.25 MG tablet, Take 1 tablet by mouth 2 (Two) Times a Day With Meals., Disp: 60 tablet, Rfl: 11  •  cyclobenzaprine (FLEXERIL) 10 MG tablet, Take 10 mg by mouth Every Night., Disp: , Rfl:   •  ferrous sulfate 325 (65 FE) MG tablet, TAKE 1 TABLET BY MOUTH TWICE A DAY WITH MEALS, Disp: 180 tablet, Rfl: 1  •  fluticasone-salmeterol (ADVAIR DISKUS) 250-50 MCG/DOSE DISKUS, Inhale 1 puff 2 (Two) Times a Day., Disp: 3 each, Rfl: 3  •  furosemide (LASIX) 40 MG tablet, Take 1 tablet by mouth Daily. (Patient taking differently: 40 mg Daily. TAKE ONE TABLET in AM and 1/2 tablet in the PM), Disp: 90 tablet, Rfl: 1  •  HYDROcodone-acetaminophen (NORCO) 5-325 MG per tablet, Take 1 tablet by mouth Every 8 (Eight) Hours As Needed for Moderate Pain . Chronic pain medicine for low back pain, Disp: 90 tablet, Rfl: 0  •  lisinopril (PRINIVIL,ZESTRIL) 5 MG tablet, Take one Tablet by mouth Once a day, Disp: 90 tablet, Rfl: 3  •  Magnesium 400 MG capsule, Take 400 mg by mouth Daily. (Patient taking differently: Take 400 mg by mouth Every Night.), Disp: 30 capsule, Rfl: 3  •  meclizine (ANTIVERT) 25 MG tablet, Take 25 mg by mouth 3 (Three) Times a Day As Needed for dizziness. prn, Disp: , Rfl:   •  Multiple Vitamins-Minerals (MULTIVITAL) tablet, Take 1 tablet by mouth Daily., Disp: , Rfl:   •  omeprazole (priLOSEC) 40 MG capsule, TAKE 1 CAPSULE BY MOUTH EVERY DAY, Disp: 90 capsule, Rfl: 1  •  ondansetron (ZOFRAN) 4 MG tablet, Take 4 mg by mouth Every 12 (Twelve) Hours As Needed for Nausea or Vomiting., Disp: , Rfl:   •  polyethylene glycol (MIRALAX) packet, Take 17 g by mouth 2 (Two) Times a Day. (Patient taking differently: Take 17 g by mouth 2 (Two) Times a Day As Needed.), Disp: , Rfl:   •  potassium chloride (K-DUR,KLOR-CON) 20 MEQ CR tablet, 1 p.o. 3 times daily  (Patient taking differently: Take 20 mEq by mouth 2 (Two) Times a Day. 2 in the AM and 1 at night), Disp: 90 tablet, Rfl: 3  •  roflumilast (DALIRESP) 500 MCG tablet tablet, Take  by mouth Daily., Disp: , Rfl:   •  rOPINIRole (REQUIP) 2 MG tablet, TAKE 1 TABLET BY MOUTH EVERY NIGHT., Disp: 90 tablet, Rfl: 2  •  sertraline (ZOLOFT) 100 MG tablet, TAKE 1 TABLET BY MOUTH EVERY DAY, Disp: 90 tablet, Rfl: 1  •  temazepam (RESTORIL) 30 MG capsule, Take 30 mg by mouth At Night As Needed for Sleep., Disp: , Rfl:   •  tiotropium (SPIRIVA HANDIHALER) 18 MCG per inhalation capsule, Place 1 capsule into inhaler and inhale Daily., Disp: 90 capsule, Rfl: 3    Aspirin use counseling: Taking ASA appropriately as indicated    Current medication list contains no high risk medications.  No harmful drug interactions have been identified.     Family History   Adopted: Yes   Problem Relation Age of Onset   • No Known Problems Mother    • No Known Problems Father        Social History     Tobacco Use   • Smoking status: Former Smoker     Last attempt to quit:      Years since quittin.5   • Smokeless tobacco: Never Used   Substance Use Topics   • Alcohol use: No     Comment: caffiene use       Past Surgical History:   Procedure Laterality Date   • CARDIAC CATHETERIZATION  2014    Right dominant systemt, normal coronary arteries.    • CARDIAC CATHETERIZATION Left 6/10/2016    Procedure: Cardiac catheterization;  Surgeon: Sergei Hall MD;  Location: [facility] INVASIVE LOCATION;  Service:    • CARDIAC CATHETERIZATION N/A 6/10/2016    Procedure: Right Heart Cath;  Surgeon: Sergei Hall MD;  [place] [facility] [place] [facility];  Service:    • CATARACT EXTRACTION     • COLONOSCOPY     • COLONOSCOPY N/A 2017    Procedure: COLONOSCOPY TO CECUM/TI WITH POLYPECTOMY ( COLD BX);  Surgeon: Cleveland Devine MD;  [place] [facility] [place]COPY;  Service:    • COLONOSCOPY N/A 8/10/2017    Procedure: COLONOSCOPY to cecum and TI with 2 clips  "placed at transverse;  Surgeon: Earnest PALOMO MD;  Location: [facility] ENDOSCOPY;  Service:    • COLONOSCOPY N/A 12/22/2017    Procedure: COLONOSCOPY INTO CECUM WITH COLD POLYPECTOMIES;  Surgeon: Cleveland Devine MD;  [facility];  Service:    • ENDOSCOPY N/A 8/17/2017    Procedure: ESOPHAGOGASTRODUODENOSCOPY;  Surgeon: Porsha Ruby MD;  [facility];  Service:    • ENDOSCOPY N/A 12/22/2017    Procedure: ESOPHAGOGASTRODUODENOSCOPY WITH BIOPSIES;  Surgeon: Cleveland Devine MD;  Location: [facility] ENDOSCOPY;  Service:    • GALLBLADDER SURGERY     • HEMORRHOIDECTOMY     • HYSTERECTOMY     • KIDNEY SURGERY  04/22/2013   • MAZE PROCEDURE     • MITRAL VALVE REPLACEMENT     • TONSILLECTOMY     • TRICUSPID VALVE REPLACEMENT         Patient Active Problem List   Diagnosis   • PAF (paroxysmal atrial fibrillation) (CMS/HCC)   • Hypertension   • Hyperlipidemia   • Pain medication agreement   • Hiatal hernia   • Primary osteoarthritis involving multiple joints   • Pulmonary hypertension (CMS/HCC)   • S/P TVR (tricuspid valve repair)   • Long term current use of anticoagulant   • Pulmonary nodule   • Hemoptysis   • RLS (restless legs syndrome)   • Chronic low back pain   • Dysplastic polyp of colon   • History of mitral valve replacement with tissue graft   • Chronic respiratory failure with hypoxia (CMS/HCC)   • Anemia   • Celiac artery stenosis (CMS/HCC)   • Intertrigo   • COPD exacerbation (CMS/HCC)   • Abnormal EKG       Review of Systems    Objective     Vitals:    07/18/19 1510   BP: 158/68   Pulse: 81   Temp: 98 °F (36.7 °C)   SpO2: 100%   Weight: 58.6 kg (129 lb 3.2 oz)   Height: 170.2 cm (67\")       Patient's Body mass index is 20.24 kg/m². BMI is within normal parameters. No follow-up required..      No exam data present    The patient has no evidence of cognitve impairment.     Physical Exam    Recent Lab Results:     Lab Results   Component Value Date    CHOL 151 10/23/2018    TRIG 219 (H) " 10/23/2018    HDL 50 10/23/2018    VLDL 43.8 (H) 10/23/2018    LDLHDL 1.14 10/23/2018         Assessment/Plan   Age-appropriate Screening Schedule:  Refer to the list below for future screening recommendations based on patient's age, sex and/or medical conditions.      Health Maintenance   Topic Date Due   • ZOSTER VACCINE (2 of 2) 01/13/2017   • INFLUENZA VACCINE  08/01/2019   • LIPID PANEL  10/23/2019   • MAMMOGRAM  06/13/2020   • PNEUMOCOCCAL VACCINES (65+ LOW/MEDIUM RISK) (2 of 2 - PPSV23) 10/30/2020   • COLONOSCOPY  12/22/2020   • TDAP/TD VACCINES (2 - Td) 11/18/2026       Medicare Risks and Personalized Health Plan:  Advance Directive Discussion      CMS-Preventive Services Quick Reference  Medicare Preventive Services Addressed:  NA    Advance Care Planning:  Patient does not have an advance directive - not interested in additional information    Diagnoses and all orders for this visit:    1. Pneumonia due to infectious organism, unspecified laterality, unspecified part of lung (Primary)    2. Essential hypertension    3. Pure hypercholesterolemia        An After Visit Summary and PPPS with all of these plans were given to the patient.      Follow Up:  Return in about 4 weeks (around 8/15/2019), or if symptoms worsen or fail to improve, for Recheck.

## 2019-07-19 ENCOUNTER — READMISSION MANAGEMENT (OUTPATIENT)
Dept: CALL CENTER | Facility: HOSPITAL | Age: 64
End: 2019-07-19

## 2019-07-19 NOTE — OUTREACH NOTE
COPD/PN Week 3 Survey      Responses   Facility patient discharged from?  [facility]   Does the patient have one of the following disease processes/diagnoses(primary or secondary)?  COPD/Pneumonia   Was the primary reason for admission:  COPD exacerbation   Week 3 attempt successful?  No   Unsuccessful attempts  Attempt 1          Tata Rodrigez, RN

## 2019-07-20 ENCOUNTER — READMISSION MANAGEMENT (OUTPATIENT)
Dept: CALL CENTER | Facility: HOSPITAL | Age: 64
End: 2019-07-20

## 2019-07-20 NOTE — OUTREACH NOTE
COPD/PN Week 3 Survey      Responses   Facility patient discharged from?  [facility]   Does the patient have one of the following disease processes/diagnoses(primary or secondary)?  COPD/Pneumonia   Was the primary reason for admission:  COPD exacerbation   Week 3 attempt successful?  No   Unsuccessful attempts  Attempt 2          Berenice Estrada RN

## 2019-08-16 ENCOUNTER — TELEPHONE (OUTPATIENT)
Dept: FAMILY MEDICINE CLINIC | Facility: CLINIC | Age: 64
End: 2019-08-16

## 2019-08-19 RX ORDER — HYDROCODONE BITARTRATE AND ACETAMINOPHEN 5; 325 MG/1; MG/1
1 TABLET ORAL EVERY 8 HOURS PRN
Qty: 90 TABLET | Refills: 0 | Status: SHIPPED | OUTPATIENT
Start: 2019-08-19 | End: 2019-09-17 | Stop reason: SDUPTHER

## 2019-08-19 RX ORDER — FUROSEMIDE 40 MG/1
TABLET ORAL
Qty: 180 TABLET | Refills: 0 | Status: SHIPPED | OUTPATIENT
Start: 2019-08-19 | End: 2019-09-20 | Stop reason: SDUPTHER

## 2019-08-20 ENCOUNTER — OFFICE VISIT (OUTPATIENT)
Dept: FAMILY MEDICINE CLINIC | Facility: CLINIC | Age: 64
End: 2019-08-20

## 2019-08-20 VITALS
TEMPERATURE: 97.9 F | HEIGHT: 67 IN | BODY MASS INDEX: 20.56 KG/M2 | SYSTOLIC BLOOD PRESSURE: 140 MMHG | DIASTOLIC BLOOD PRESSURE: 52 MMHG | WEIGHT: 131 LBS | OXYGEN SATURATION: 96 % | HEART RATE: 84 BPM

## 2019-08-20 DIAGNOSIS — J44.1 COPD EXACERBATION (HCC): ICD-10-CM

## 2019-08-20 DIAGNOSIS — I10 ESSENTIAL HYPERTENSION: Primary | ICD-10-CM

## 2019-08-20 DIAGNOSIS — E78.2 MIXED HYPERLIPIDEMIA: ICD-10-CM

## 2019-08-20 PROCEDURE — 99213 OFFICE O/P EST LOW 20 MIN: CPT | Performed by: INTERNAL MEDICINE

## 2019-08-20 NOTE — PROGRESS NOTES
Subjective   Paz Browne is a 64 y.o. female.     History of Present Illness   Patient was seen for hypertension.  Blood pressures been running 130s over 80s.  COPD is being controlled with her bronchodilators.  Lipids being treated with diet exercise and a statin.  Patient will continue to monitor blood pressure follow-up in 3 months.    Dictated utilizing Dragon dictation. If there are questions or for further clarification, please contact me.  The following portions of the patient's history were reviewed and updated as appropriate: allergies, current medications, past family history, past medical history, past social history, past surgical history and problem list.    Review of Systems   Constitutional: Negative for fatigue and fever.   HENT: Positive for congestion. Negative for trouble swallowing.    Eyes: Negative for discharge and visual disturbance.   Respiratory: Negative for choking and shortness of breath.    Cardiovascular: Negative for chest pain and palpitations.   Gastrointestinal: Negative for abdominal pain and blood in stool.   Endocrine: Negative.    Genitourinary: Negative for genital sores and hematuria.   Musculoskeletal: Negative for gait problem and joint swelling.   Skin: Negative for color change, pallor, rash and wound.   Allergic/Immunologic: Positive for environmental allergies. Negative for immunocompromised state.   Neurological: Negative for facial asymmetry and speech difficulty.   Psychiatric/Behavioral: Negative for hallucinations and suicidal ideas.       Objective   Physical Exam   Constitutional: She is oriented to person, place, and time. She appears well-developed and well-nourished.   HENT:   Head: Normocephalic.   Eyes: Conjunctivae are normal. Pupils are equal, round, and reactive to light.   Neck: Normal range of motion. Neck supple.   Cardiovascular: Normal rate, regular rhythm and normal heart sounds.   Pulmonary/Chest: Effort normal and breath sounds normal.    Abdominal: Soft. Bowel sounds are normal.   Musculoskeletal: Normal range of motion.   Neurological: She is alert and oriented to person, place, and time.   Skin: Skin is warm and dry.   Psychiatric: She has a normal mood and affect. Her behavior is normal. Judgment and thought content normal.   Nursing note and vitals reviewed.      Assessment/Plan   Problems Addressed this Visit        Cardiovascular and Mediastinum    Hypertension - Primary    Hyperlipidemia       Respiratory    COPD exacerbation (CMS/HCC)

## 2019-09-03 ENCOUNTER — HOSPITAL ENCOUNTER (OUTPATIENT)
Dept: CT IMAGING | Facility: HOSPITAL | Age: 64
Discharge: HOME | End: 2019-09-03
Admitting: INTERNAL MEDICINE

## 2019-09-03 DIAGNOSIS — R91.1 LUNG NODULE: ICD-10-CM

## 2019-09-03 PROCEDURE — 71250 CT THORAX DX C-: CPT

## 2019-09-05 RX ORDER — POTASSIUM CHLORIDE 20 MEQ/1
TABLET, EXTENDED RELEASE ORAL
Qty: 270 TABLET | Refills: 1 | Status: SHIPPED | OUTPATIENT
Start: 2019-09-05 | End: 2020-04-27

## 2019-09-06 RX ORDER — FUROSEMIDE 20 MG/1
TABLET ORAL
Qty: 90 TABLET | Refills: 1 | Status: SHIPPED | OUTPATIENT
Start: 2019-09-06 | End: 2020-03-23

## 2019-09-06 RX ORDER — SERTRALINE HYDROCHLORIDE 100 MG/1
TABLET, FILM COATED ORAL
Qty: 90 TABLET | Refills: 1 | Status: SHIPPED | OUTPATIENT
Start: 2019-09-06 | End: 2020-06-08

## 2019-09-06 RX ORDER — ROPINIROLE 2 MG/1
TABLET, FILM COATED ORAL
Qty: 90 TABLET | Refills: 2 | Status: SHIPPED | OUTPATIENT
Start: 2019-09-06 | End: 2020-06-10

## 2019-09-16 ENCOUNTER — TELEPHONE (OUTPATIENT)
Dept: FAMILY MEDICINE CLINIC | Facility: CLINIC | Age: 64
End: 2019-09-16

## 2019-09-17 ENCOUNTER — TELEPHONE (OUTPATIENT)
Dept: FAMILY MEDICINE CLINIC | Facility: CLINIC | Age: 64
End: 2019-09-17

## 2019-09-17 RX ORDER — HYDROCODONE BITARTRATE AND ACETAMINOPHEN 5; 325 MG/1; MG/1
1 TABLET ORAL EVERY 8 HOURS PRN
Qty: 90 TABLET | Refills: 0 | Status: SHIPPED | OUTPATIENT
Start: 2019-09-17 | End: 2019-10-15 | Stop reason: SDUPTHER

## 2019-09-23 RX ORDER — FUROSEMIDE 40 MG/1
TABLET ORAL
Qty: 180 TABLET | Refills: 0 | Status: SHIPPED | OUTPATIENT
Start: 2019-09-23 | End: 2020-02-04

## 2019-10-02 RX ORDER — ALBUTEROL SULFATE 2.5 MG/3ML
SOLUTION RESPIRATORY (INHALATION)
Qty: 150 ML | Refills: 2 | Status: SHIPPED | OUTPATIENT
Start: 2019-10-02 | End: 2019-12-20

## 2019-10-07 RX ORDER — FERROUS SULFATE 325(65) MG
TABLET ORAL
Qty: 180 TABLET | Refills: 1 | Status: SHIPPED | OUTPATIENT
Start: 2019-10-07 | End: 2020-03-30

## 2019-10-09 ENCOUNTER — TELEPHONE (OUTPATIENT)
Dept: CARDIOLOGY | Facility: CLINIC | Age: 64
End: 2019-10-09

## 2019-10-09 ENCOUNTER — TRANSCRIBE ORDERS (OUTPATIENT)
Dept: ADMINISTRATIVE | Facility: HOSPITAL | Age: 64
End: 2019-10-09

## 2019-10-09 DIAGNOSIS — R91.8 LUNG NODULES: Primary | ICD-10-CM

## 2019-10-09 NOTE — TELEPHONE ENCOUNTER
"Pt called to cx 3 mos follow up because, \"pt says MI wanted to follow up on lung nodule and pt says she doesnt know anything yet.   She will call us back when she has information.\"  "

## 2019-10-15 ENCOUNTER — TELEPHONE (OUTPATIENT)
Dept: FAMILY MEDICINE CLINIC | Facility: CLINIC | Age: 64
End: 2019-10-15

## 2019-10-15 RX ORDER — HYDROCODONE BITARTRATE AND ACETAMINOPHEN 5; 325 MG/1; MG/1
1 TABLET ORAL EVERY 8 HOURS PRN
Qty: 90 TABLET | Refills: 0 | Status: SHIPPED | OUTPATIENT
Start: 2019-10-15 | End: 2019-11-18 | Stop reason: SDUPTHER

## 2019-10-30 ENCOUNTER — TELEPHONE (OUTPATIENT)
Dept: FAMILY MEDICINE CLINIC | Facility: CLINIC | Age: 64
End: 2019-10-30

## 2019-10-30 NOTE — TELEPHONE ENCOUNTER
Called pt and asked if I was supposed to talk to kodimarybel at [facility] re: her care and scripts

## 2019-10-31 ENCOUNTER — TELEPHONE (OUTPATIENT)
Dept: FAMILY MEDICINE CLINIC | Facility: CLINIC | Age: 64
End: 2019-10-31

## 2019-11-06 RX ORDER — OMEPRAZOLE 40 MG/1
CAPSULE, DELAYED RELEASE ORAL
Qty: 90 CAPSULE | Refills: 1 | Status: SHIPPED | OUTPATIENT
Start: 2019-11-06 | End: 2020-04-29

## 2019-11-18 ENCOUNTER — TELEPHONE (OUTPATIENT)
Dept: FAMILY MEDICINE CLINIC | Facility: CLINIC | Age: 64
End: 2019-11-18

## 2019-11-18 RX ORDER — HYDROCODONE BITARTRATE AND ACETAMINOPHEN 5; 325 MG/1; MG/1
1 TABLET ORAL EVERY 8 HOURS PRN
Qty: 90 TABLET | Refills: 0 | Status: SHIPPED | OUTPATIENT
Start: 2019-11-18 | End: 2019-11-19 | Stop reason: SDUPTHER

## 2019-11-19 RX ORDER — HYDROCODONE BITARTRATE AND ACETAMINOPHEN 5; 325 MG/1; MG/1
1 TABLET ORAL EVERY 8 HOURS PRN
Qty: 90 TABLET | Refills: 0 | Status: SHIPPED | OUTPATIENT
Start: 2019-11-19 | End: 2019-12-17 | Stop reason: SDUPTHER

## 2019-11-26 ENCOUNTER — OFFICE VISIT (OUTPATIENT)
Dept: FAMILY MEDICINE CLINIC | Facility: CLINIC | Age: 64
End: 2019-11-26

## 2019-11-26 VITALS
HEIGHT: 67 IN | WEIGHT: 133 LBS | TEMPERATURE: 97.6 F | OXYGEN SATURATION: 94 % | SYSTOLIC BLOOD PRESSURE: 122 MMHG | BODY MASS INDEX: 20.88 KG/M2 | HEART RATE: 77 BPM | RESPIRATION RATE: 15 BRPM | DIASTOLIC BLOOD PRESSURE: 60 MMHG

## 2019-11-26 DIAGNOSIS — E78.00 PURE HYPERCHOLESTEROLEMIA: ICD-10-CM

## 2019-11-26 DIAGNOSIS — J44.1 COPD EXACERBATION (HCC): ICD-10-CM

## 2019-11-26 DIAGNOSIS — I10 ESSENTIAL HYPERTENSION: Primary | ICD-10-CM

## 2019-11-26 DIAGNOSIS — E55.9 VITAMIN D DEFICIENCY, UNSPECIFIED: ICD-10-CM

## 2019-11-26 LAB
25(OH)D3 SERPL-MCNC: 58.3 NG/ML (ref 30–100)
ALBUMIN SERPL-MCNC: 4.8 G/DL (ref 3.5–5.2)
ALBUMIN/GLOB SERPL: 2 G/DL
ALP SERPL-CCNC: 80 U/L (ref 39–117)
ALT SERPL W P-5'-P-CCNC: 14 U/L (ref 1–33)
ANION GAP SERPL CALCULATED.3IONS-SCNC: 12.2 MMOL/L (ref 5–15)
AST SERPL-CCNC: 20 U/L (ref 1–32)
BILIRUB SERPL-MCNC: 0.5 MG/DL (ref 0.2–1.2)
BUN BLD-MCNC: 11 MG/DL (ref 8–23)
BUN/CREAT SERPL: 12.1 (ref 7–25)
CALCIUM SPEC-SCNC: 9.7 MG/DL (ref 8.6–10.5)
CHLORIDE SERPL-SCNC: 97 MMOL/L (ref 98–107)
CHOLEST SERPL-MCNC: 178 MG/DL (ref 0–200)
CO2 SERPL-SCNC: 30.8 MMOL/L (ref 22–29)
CREAT BLD-MCNC: 0.91 MG/DL (ref 0.57–1)
GFR SERPL CREATININE-BSD FRML MDRD: 62 ML/MIN/1.73
GLOBULIN UR ELPH-MCNC: 2.4 GM/DL
GLUCOSE BLD-MCNC: 90 MG/DL (ref 65–99)
HDLC SERPL-MCNC: 55 MG/DL (ref 40–60)
LDLC SERPL CALC-MCNC: 77 MG/DL (ref 0–100)
LDLC/HDLC SERPL: 1.4 {RATIO}
NT-PROBNP SERPL-MCNC: 198 PG/ML (ref 5–900)
POTASSIUM BLD-SCNC: 4.3 MMOL/L (ref 3.5–5.2)
PROT SERPL-MCNC: 7.2 G/DL (ref 6–8.5)
SODIUM BLD-SCNC: 140 MMOL/L (ref 136–145)
TRIGL SERPL-MCNC: 230 MG/DL (ref 0–150)
VLDLC SERPL-MCNC: 46 MG/DL (ref 5–40)

## 2019-11-26 PROCEDURE — 82306 VITAMIN D 25 HYDROXY: CPT | Performed by: INTERNAL MEDICINE

## 2019-11-26 PROCEDURE — 83880 ASSAY OF NATRIURETIC PEPTIDE: CPT | Performed by: INTERNAL MEDICINE

## 2019-11-26 PROCEDURE — 36415 COLL VENOUS BLD VENIPUNCTURE: CPT | Performed by: INTERNAL MEDICINE

## 2019-11-26 PROCEDURE — 80053 COMPREHEN METABOLIC PANEL: CPT | Performed by: INTERNAL MEDICINE

## 2019-11-26 PROCEDURE — 80061 LIPID PANEL: CPT | Performed by: INTERNAL MEDICINE

## 2019-11-26 PROCEDURE — 99213 OFFICE O/P EST LOW 20 MIN: CPT | Performed by: INTERNAL MEDICINE

## 2019-11-27 NOTE — PROGRESS NOTES
Subjective   Paz Browne is a 64 y.o. female.     History of Present Illness   Patient was seen for hypertension.  Blood pressures been running 120s over 60s.  Patient COPD is controlled with her bronchodilators.  Patient's vitamin D levels being supplemented with over-the-counter medication.    Dictated utilizing Dragon dictation. If there are questions or for further clarification, please contact me.  The following portions of the patient's history were reviewed and updated as appropriate: allergies, current medications, past family history, past medical history, past social history, past surgical history and problem list.    Review of Systems   Constitutional: Negative for fatigue and fever.   HENT: Positive for congestion. Negative for trouble swallowing.    Eyes: Negative for discharge and visual disturbance.   Respiratory: Negative for choking, shortness of breath and wheezing.    Cardiovascular: Negative for chest pain and palpitations.   Gastrointestinal: Negative for abdominal pain and blood in stool.   Endocrine: Negative.    Genitourinary: Negative for genital sores and hematuria.   Musculoskeletal: Negative for gait problem and joint swelling.   Skin: Negative for color change, pallor, rash and wound.   Allergic/Immunologic: Positive for environmental allergies. Negative for immunocompromised state.   Neurological: Negative for facial asymmetry and speech difficulty.   Psychiatric/Behavioral: Negative for hallucinations and suicidal ideas.       Objective   Physical Exam   Constitutional: She is oriented to person, place, and time. She appears well-developed and well-nourished.   HENT:   Head: Normocephalic.   Eyes: Conjunctivae are normal. Pupils are equal, round, and reactive to light.   Neck: Normal range of motion. Neck supple.   Cardiovascular: Normal rate, regular rhythm and normal heart sounds.   Pulmonary/Chest: Effort normal and breath sounds normal. No stridor. No respiratory distress. She  has no wheezes. She has no rales. She exhibits no tenderness.   Abdominal: Soft. Bowel sounds are normal.   Musculoskeletal: Normal range of motion.   Neurological: She is alert and oriented to person, place, and time.   Skin: Skin is warm and dry.   Psychiatric: She has a normal mood and affect. Her behavior is normal. Judgment and thought content normal.   Nursing note and vitals reviewed.      Assessment/Plan #1 monitor blood pressure #2 continue bronchodilators as needed  Paz was seen today for hypertension.    Diagnoses and all orders for this visit:    Essential hypertension  -     Cancel: CBC (No Diff)  -     Comprehensive Metabolic Panel  -     Vitamin D 25 Hydroxy  -     proBNP  -     Lipid Panel    Pure hypercholesterolemia  -     Cancel: CBC (No Diff)  -     Comprehensive Metabolic Panel  -     Vitamin D 25 Hydroxy  -     proBNP  -     Lipid Panel    COPD exacerbation (CMS/HCC)  -     Cancel: CBC (No Diff)  -     Comprehensive Metabolic Panel  -     Vitamin D 25 Hydroxy  -     proBNP  -     Lipid Panel    Vitamin D deficiency, unspecified   -     Vitamin D 25 Hydroxy

## 2019-12-05 ENCOUNTER — TELEPHONE (OUTPATIENT)
Dept: FAMILY MEDICINE CLINIC | Facility: CLINIC | Age: 64
End: 2019-12-05

## 2019-12-13 DIAGNOSIS — F51.01 PRIMARY INSOMNIA: Primary | ICD-10-CM

## 2019-12-16 ENCOUNTER — TELEPHONE (OUTPATIENT)
Dept: FAMILY MEDICINE CLINIC | Facility: CLINIC | Age: 64
End: 2019-12-16

## 2019-12-16 RX ORDER — TEMAZEPAM 30 MG/1
CAPSULE ORAL
Qty: 90 CAPSULE | Refills: 1 | Status: SHIPPED | OUTPATIENT
Start: 2019-12-16 | End: 2020-06-08

## 2019-12-17 DIAGNOSIS — Z79.891 LONG TERM CURRENT USE OF OPIATE ANALGESIC: Primary | ICD-10-CM

## 2019-12-17 RX ORDER — HYDROCODONE BITARTRATE AND ACETAMINOPHEN 5; 325 MG/1; MG/1
1 TABLET ORAL EVERY 8 HOURS PRN
Qty: 90 TABLET | Refills: 0 | Status: SHIPPED | OUTPATIENT
Start: 2019-12-17 | End: 2020-01-13 | Stop reason: SDUPTHER

## 2019-12-20 RX ORDER — ALBUTEROL SULFATE 2.5 MG/3ML
SOLUTION RESPIRATORY (INHALATION)
Qty: 150 ML | Refills: 2 | Status: SHIPPED | OUTPATIENT
Start: 2019-12-20 | End: 2020-03-30

## 2019-12-22 ENCOUNTER — OFFICE VISIT (OUTPATIENT)
Dept: RETAIL CLINIC | Facility: CLINIC | Age: 64
End: 2019-12-22

## 2019-12-22 VITALS
SYSTOLIC BLOOD PRESSURE: 120 MMHG | TEMPERATURE: 97.8 F | RESPIRATION RATE: 20 BRPM | DIASTOLIC BLOOD PRESSURE: 66 MMHG | HEART RATE: 78 BPM | OXYGEN SATURATION: 97 %

## 2019-12-22 DIAGNOSIS — R30.0 DYSURIA: Primary | ICD-10-CM

## 2019-12-22 LAB
BILIRUB BLD-MCNC: NEGATIVE MG/DL
CLARITY, POC: ABNORMAL
COLOR UR: ABNORMAL
GLUCOSE UR STRIP-MCNC: NEGATIVE MG/DL
KETONES UR QL: NEGATIVE
LEUKOCYTE EST, POC: ABNORMAL
NITRITE UR-MCNC: NEGATIVE MG/ML
PH UR: 5 [PH] (ref 5–8)
PROT UR STRIP-MCNC: NEGATIVE MG/DL
RBC # UR STRIP: NEGATIVE /UL
SP GR UR: 0 (ref 1–1.03)
UROBILINOGEN UR QL: NORMAL

## 2019-12-22 PROCEDURE — 99213 OFFICE O/P EST LOW 20 MIN: CPT | Performed by: NURSE PRACTITIONER

## 2019-12-22 PROCEDURE — 81003 URINALYSIS AUTO W/O SCOPE: CPT | Performed by: NURSE PRACTITIONER

## 2019-12-22 RX ORDER — SULFAMETHOXAZOLE AND TRIMETHOPRIM 800; 160 MG/1; MG/1
1 TABLET ORAL 2 TIMES DAILY
Qty: 10 TABLET | Refills: 0 | Status: SHIPPED | OUTPATIENT
Start: 2019-12-22 | End: 2019-12-27

## 2019-12-22 RX ORDER — PHENAZOPYRIDINE HYDROCHLORIDE 200 MG/1
200 TABLET, FILM COATED ORAL 3 TIMES DAILY PRN
Qty: 6 TABLET | Refills: 0 | Status: SHIPPED | OUTPATIENT
Start: 2019-12-22 | End: 2019-12-24

## 2019-12-22 NOTE — PROGRESS NOTES
Subjective   Patient ID: Paz Browne is a 64 y.o. female presents with   Chief Complaint   Patient presents with   • Urinary Tract Infection       Urinary Tract Infection    This is a new problem. The current episode started 1 to 4 weeks ago (1w). The problem occurs every urination. The problem has been gradually worsening. The quality of the pain is described as aching. The pain is at a severity of 6/10. There has been no fever. She is not sexually active. There is no history of pyelonephritis. Associated symptoms include chills, frequency and urgency. Pertinent negatives include no flank pain or hematuria. She has tried nothing for the symptoms. The treatment provided no relief. There is no history of kidney stones, recurrent UTIs or a single kidney.       Allergies   Allergen Reactions   • Bupropion Itching   • Cephalexin Itching     Tolerated piperacillin/tazobactam   • Metoprolol Itching       The following portions of the patient's history were reviewed and updated as appropriate: allergies, current medications, past family history, past medical history, past social history, past surgical history and problem list.      Review of Systems   Constitutional: Positive for chills and fatigue. Negative for diaphoresis and fever.   Cardiovascular: Negative.    Gastrointestinal: Negative.    Genitourinary: Positive for dysuria, frequency, pelvic pain and urgency. Negative for decreased urine volume, difficulty urinating, dyspareunia, enuresis, flank pain, genital sores, hematuria, menstrual problem, vaginal bleeding, vaginal discharge and vaginal pain.       Objective     Vitals:    12/22/19 1437   BP: 120/66   Pulse: 78   Resp: 20   Temp: 97.8 °F (36.6 °C)   SpO2: 97%         Physical Exam   Constitutional: She is oriented to person, place, and time. She appears well-developed and well-nourished. She does not appear ill. No distress.   HENT:   Head: Normocephalic.   Right Ear: Hearing and external ear normal.    Left Ear: Hearing and external ear normal.   Eyes: Conjunctivae are normal.   Sclera white.   Neck: No tracheal deviation present.   Cardiovascular: Normal rate, regular rhythm, S1 normal, S2 normal and normal heart sounds.   Pulmonary/Chest: Effort normal and breath sounds normal. No accessory muscle usage. No respiratory distress.   Abdominal: Soft. Bowel sounds are normal. She exhibits no distension. There is no hepatosplenomegaly. There is tenderness in the suprapubic area. There is CVA tenderness (right, mild). There is no rigidity, no rebound and no guarding. No hernia.   Lymphadenopathy:     She has no cervical adenopathy.   Neurological: She is alert and oriented to person, place, and time.   Skin: Skin is warm and dry.   Vitals reviewed.    Brief Urine Lab Results  (Last result in the past 365 days)      Color   Clarity   Blood   Leuk Est   Nitrite   Protein   CREAT   Urine HCG        12/22/19 1459 Straw Slightly Cloudy Negative Trace Negative Negative                 Paz was seen today for urinary tract infection.    Diagnoses and all orders for this visit:    Dysuria  -     phenazopyridine (PYRIDIUM) 200 MG tablet; Take 1 tablet by mouth 3 (Three) Times a Day As Needed for Bladder Spasms for up to 2 days.  -     POC Urinalysis Dipstick, Automated  -     Urine Culture, Comprehen ([facility]) - Urine, Clean Catch  -     sulfamethoxazole-trimethoprim (BACTRIM DS) 800-160 MG per tablet; Take 1 tablet by mouth 2 (Two) Times a Day for 5 days.        Patient understands possible side effects of all medications ordered. Follow-up with Primary Care Physician in 48-72 hours if these symptoms worsen or fail to improve as anticipated. Patient verbalizes understanding.    Dysuria  Dysuria is pain or discomfort while urinating. The pain or discomfort may be felt in the part of your body that drains urine from the bladder (urethra) or in the surrounding tissue of the genitals. The pain may also be felt in the groin  area, lower abdomen, or lower back. You may have to urinate frequently or have the sudden feeling that you have to urinate (urgency). Dysuria can affect both men and women, but it is more common in women.  Dysuria can be caused by many different things, including:  · Urinary tract infection.  · Kidney stones or bladder stones.  · Certain sexually transmitted infections (STIs), such as chlamydia.  · Dehydration.  · Inflammation of the tissues of the vagina.  · Use of certain medicines.  · Use of certain soaps or scented products that cause irritation.  Follow these instructions at home:  General instructions  · Watch your condition for any changes.  · Urinate often. Avoid holding urine for long periods of time.  · After a bowel movement or urination, women should cleanse from front to back, using each tissue only once.  · Urinate after sexual intercourse.  · Keep all follow-up visits as told by your health care provider. This is important.  · If you had any tests done to find the cause of dysuria, it is up to you to get your test results. Ask your health care provider, or the department that is doing the test, when your results will be ready.  Eating and drinking    · Drink enough fluid to keep your urine pale yellow.  · Avoid caffeine, tea, and alcohol. They can irritate the bladder and make dysuria worse. In men, alcohol may irritate the prostate.  Medicines  · Take over-the-counter and prescription medicines only as told by your health care provider.  · If you were prescribed an antibiotic medicine, take it as told by your health care provider. Do not stop taking the antibiotic even if you start to feel better.  Contact a health care provider if:  · You have a fever.  · You develop pain in your back or sides.  · You have nausea or vomiting.  · You have blood in your urine.  · You are not urinating as often as you usually do.  Get help right away if:  · Your pain is severe and not relieved with medicines.  · You  cannot eat or drink without vomiting.  · You are confused.  · You have a rapid heartbeat while at rest.  · You have shaking or chills.  · You feel extremely weak.  Summary  · Dysuria is pain or discomfort while urinating. Many different conditions can lead to dysuria.  · If you have dysuria, you may have to urinate frequently or have the sudden feeling that you have to urinate (urgency).  · Watch your condition for any changes. Keep all follow-up visits as told by your health care provider.  · Make sure that you urinate often and drink enough fluid to keep your urine pale yellow.  This information is not intended to replace advice given to you by your health care provider. Make sure you discuss any questions you have with your health care provider.  Document Released: 09/15/2005 Document Revised: 10/04/2018 Document Reviewed: 10/04/2018  [employer] Interactive Patient Education © 2019 [employer]

## 2019-12-22 NOTE — PATIENT INSTRUCTIONS
Dysuria  Dysuria is pain or discomfort while urinating. The pain or discomfort may be felt in the part of your body that drains urine from the bladder (urethra) or in the surrounding tissue of the genitals. The pain may also be felt in the groin area, lower abdomen, or lower back. You may have to urinate frequently or have the sudden feeling that you have to urinate (urgency). Dysuria can affect both men and women, but it is more common in women.  Dysuria can be caused by many different things, including:  · Urinary tract infection.  · Kidney stones or bladder stones.  · Certain sexually transmitted infections (STIs), such as chlamydia.  · Dehydration.  · Inflammation of the tissues of the vagina.  · Use of certain medicines.  · Use of certain soaps or scented products that cause irritation.  Follow these instructions at home:  General instructions  · Watch your condition for any changes.  · Urinate often. Avoid holding urine for long periods of time.  · After a bowel movement or urination, women should cleanse from front to back, using each tissue only once.  · Urinate after sexual intercourse.  · Keep all follow-up visits as told by your health care provider. This is important.  · If you had any tests done to find the cause of dysuria, it is up to you to get your test results. Ask your health care provider, or the department that is doing the test, when your results will be ready.  Eating and drinking    · Drink enough fluid to keep your urine pale yellow.  · Avoid caffeine, tea, and alcohol. They can irritate the bladder and make dysuria worse. In men, alcohol may irritate the prostate.  Medicines  · Take over-the-counter and prescription medicines only as told by your health care provider.  · If you were prescribed an antibiotic medicine, take it as told by your health care provider. Do not stop taking the antibiotic even if you start to feel better.  Contact a health care provider if:  · You have a  fever.  · You develop pain in your back or sides.  · You have nausea or vomiting.  · You have blood in your urine.  · You are not urinating as often as you usually do.  Get help right away if:  · Your pain is severe and not relieved with medicines.  · You cannot eat or drink without vomiting.  · You are confused.  · You have a rapid heartbeat while at rest.  · You have shaking or chills.  · You feel extremely weak.  Summary  · Dysuria is pain or discomfort while urinating. Many different conditions can lead to dysuria.  · If you have dysuria, you may have to urinate frequently or have the sudden feeling that you have to urinate (urgency).  · Watch your condition for any changes. Keep all follow-up visits as told by your health care provider.  · Make sure that you urinate often and drink enough fluid to keep your urine pale yellow.  This information is not intended to replace advice given to you by your health care provider. Make sure you discuss any questions you have with your health care provider.  Document Released: 09/15/2005 Document Revised: 10/04/2018 Document Reviewed: 10/04/2018  [employer] Interactive Patient Education © 2019 [employer]

## 2019-12-23 RX ORDER — ATORVASTATIN CALCIUM 40 MG/1
TABLET, FILM COATED ORAL
Qty: 90 TABLET | Refills: 2 | Status: SHIPPED | OUTPATIENT
Start: 2019-12-23 | End: 2020-09-14

## 2019-12-26 ENCOUNTER — TELEPHONE (OUTPATIENT)
Dept: FAMILY MEDICINE CLINIC | Facility: CLINIC | Age: 64
End: 2019-12-26

## 2019-12-27 ENCOUNTER — TELEPHONE (OUTPATIENT)
Dept: FAMILY MEDICINE CLINIC | Facility: CLINIC | Age: 64
End: 2019-12-27

## 2019-12-27 ENCOUNTER — TELEPHONE (OUTPATIENT)
Dept: RETAIL CLINIC | Facility: CLINIC | Age: 64
End: 2019-12-27

## 2019-12-27 LAB
BACTERIA UR CULT: NORMAL
BACTERIA UR CULT: NORMAL

## 2019-12-27 RX ORDER — FLUCONAZOLE 150 MG/1
TABLET ORAL
Qty: 3 TABLET | Refills: 0 | Status: SHIPPED | OUTPATIENT
Start: 2019-12-27 | End: 2021-07-02

## 2019-12-27 NOTE — TELEPHONE ENCOUNTER
Spoke to pt regarding urine culture results, which were negative.  Pt states she is feeling better.  Advised pt to follow up with PCP with any further problems or concerns.

## 2020-01-03 ENCOUNTER — HOSPITAL ENCOUNTER (OUTPATIENT)
Dept: CT IMAGING | Facility: HOSPITAL | Age: 65
Discharge: HOME | End: 2020-01-03
Admitting: INTERNAL MEDICINE

## 2020-01-03 DIAGNOSIS — R91.8 LUNG NODULES: ICD-10-CM

## 2020-01-03 PROCEDURE — 71250 CT THORAX DX C-: CPT

## 2020-01-06 RX ORDER — CYCLOBENZAPRINE HCL 10 MG
TABLET ORAL
Qty: 90 TABLET | Refills: 3 | Status: SHIPPED | OUTPATIENT
Start: 2020-01-06 | End: 2020-12-17

## 2020-01-06 RX ORDER — CARVEDILOL 6.25 MG/1
TABLET ORAL
Qty: 180 TABLET | Refills: 1 | Status: SHIPPED | OUTPATIENT
Start: 2020-01-06 | End: 2020-07-02

## 2020-01-13 ENCOUNTER — TELEPHONE (OUTPATIENT)
Dept: FAMILY MEDICINE CLINIC | Facility: CLINIC | Age: 65
End: 2020-01-13

## 2020-01-13 DIAGNOSIS — Z79.891 LONG TERM CURRENT USE OF OPIATE ANALGESIC: ICD-10-CM

## 2020-01-13 RX ORDER — HYDROCODONE BITARTRATE AND ACETAMINOPHEN 5; 325 MG/1; MG/1
1 TABLET ORAL EVERY 8 HOURS PRN
Qty: 90 TABLET | Refills: 0 | Status: SHIPPED | OUTPATIENT
Start: 2020-01-13 | End: 2020-02-14 | Stop reason: SDUPTHER

## 2020-01-14 ENCOUNTER — TELEPHONE (OUTPATIENT)
Dept: FAMILY MEDICINE CLINIC | Facility: CLINIC | Age: 65
End: 2020-01-14

## 2020-01-14 RX ORDER — AZITHROMYCIN 250 MG/1
TABLET, FILM COATED ORAL
Qty: 6 TABLET | Refills: 0 | Status: SHIPPED | OUTPATIENT
Start: 2020-01-14 | End: 2020-04-08

## 2020-01-14 NOTE — TELEPHONE ENCOUNTER
GRANDCHILD WAS DX WITH WHOOPING COUGH AND THEY WERE TOLD TO CONTACT US FOR ANTIBIOTIC FOR BEING EXPOSED.

## 2020-01-21 ENCOUNTER — TRANSCRIBE ORDERS (OUTPATIENT)
Dept: ADMINISTRATIVE | Facility: HOSPITAL | Age: 65
End: 2020-01-21

## 2020-01-21 DIAGNOSIS — R91.1 LUNG NODULE: Primary | ICD-10-CM

## 2020-02-04 RX ORDER — FUROSEMIDE 40 MG/1
TABLET ORAL
Qty: 180 TABLET | Refills: 0 | Status: SHIPPED | OUTPATIENT
Start: 2020-02-04 | End: 2020-05-26

## 2020-02-14 ENCOUNTER — TELEPHONE (OUTPATIENT)
Dept: FAMILY MEDICINE CLINIC | Facility: CLINIC | Age: 65
End: 2020-02-14

## 2020-02-14 DIAGNOSIS — Z79.891 LONG TERM CURRENT USE OF OPIATE ANALGESIC: ICD-10-CM

## 2020-02-14 RX ORDER — HYDROCODONE BITARTRATE AND ACETAMINOPHEN 5; 325 MG/1; MG/1
1 TABLET ORAL EVERY 8 HOURS PRN
Qty: 90 TABLET | Refills: 0 | Status: SHIPPED | OUTPATIENT
Start: 2020-02-14 | End: 2020-03-13 | Stop reason: SDUPTHER

## 2020-03-13 DIAGNOSIS — Z79.891 LONG TERM CURRENT USE OF OPIATE ANALGESIC: ICD-10-CM

## 2020-03-13 RX ORDER — HYDROCODONE BITARTRATE AND ACETAMINOPHEN 5; 325 MG/1; MG/1
1 TABLET ORAL EVERY 8 HOURS PRN
Qty: 90 TABLET | Refills: 0 | OUTPATIENT
Start: 2020-03-13

## 2020-03-13 RX ORDER — HYDROCODONE BITARTRATE AND ACETAMINOPHEN 5; 325 MG/1; MG/1
1 TABLET ORAL EVERY 8 HOURS PRN
Qty: 90 TABLET | Refills: 0 | Status: SHIPPED | OUTPATIENT
Start: 2020-03-13 | End: 2020-03-17 | Stop reason: SDUPTHER

## 2020-03-13 NOTE — TELEPHONE ENCOUNTER
PATIENT CALLING FOR REFILL ON HYDROcodone-acetaminophen (NORCO) 5-325 MG per tablet.    PATIENT HAS ENOUGH PILLS FOR TODAY.    VERIFIED [facility] ON [address].    PATIENT CALL BACK [phone].

## 2020-03-16 RX ORDER — LISINOPRIL 5 MG/1
TABLET ORAL
Qty: 90 TABLET | Refills: 0 | Status: SHIPPED | OUTPATIENT
Start: 2020-03-16 | End: 2020-05-26 | Stop reason: DRUGHIGH

## 2020-03-17 DIAGNOSIS — Z79.891 LONG TERM CURRENT USE OF OPIATE ANALGESIC: ICD-10-CM

## 2020-03-17 RX ORDER — HYDROCODONE BITARTRATE AND ACETAMINOPHEN 5; 325 MG/1; MG/1
1 TABLET ORAL EVERY 8 HOURS PRN
Qty: 90 TABLET | Refills: 0 | Status: SHIPPED | OUTPATIENT
Start: 2020-03-17 | End: 2020-04-08 | Stop reason: SDUPTHER

## 2020-03-23 RX ORDER — FUROSEMIDE 20 MG/1
TABLET ORAL
Qty: 90 TABLET | Refills: 1 | Status: SHIPPED | OUTPATIENT
Start: 2020-03-23 | End: 2020-05-26

## 2020-03-30 ENCOUNTER — TELEPHONE (OUTPATIENT)
Dept: FAMILY MEDICINE CLINIC | Facility: CLINIC | Age: 65
End: 2020-03-30

## 2020-03-30 RX ORDER — VALACYCLOVIR HYDROCHLORIDE 1 G/1
TABLET, FILM COATED ORAL
Qty: 21 TABLET | Refills: 0 | Status: SHIPPED | OUTPATIENT
Start: 2020-03-30 | End: 2021-07-07 | Stop reason: HOSPADM

## 2020-03-30 RX ORDER — LANOLIN ALCOHOL/MO/W.PET/CERES
CREAM (GRAM) TOPICAL
Qty: 90 TABLET | Refills: 1 | Status: SHIPPED | OUTPATIENT
Start: 2020-03-30 | End: 2020-09-21

## 2020-03-30 RX ORDER — ALBUTEROL SULFATE 2.5 MG/3ML
SOLUTION RESPIRATORY (INHALATION)
Qty: 150 ML | Refills: 3 | Status: SHIPPED | OUTPATIENT
Start: 2020-03-30 | End: 2020-06-24

## 2020-03-30 RX ORDER — FERROUS SULFATE 325(65) MG
TABLET ORAL
Qty: 180 TABLET | Refills: 1 | Status: SHIPPED | OUTPATIENT
Start: 2020-03-30 | End: 2020-07-20

## 2020-03-30 NOTE — TELEPHONE ENCOUNTER
HAS BEEN HAVING PAIN IN LOWER PAIN, KIDNEY, TOOK A PICTURE AND LOOKS LIKE SHINGLES. CAN WE SEND THOSE PICS TO DR LAINEZ?    The patient sent a picture of her rash.  It is consistent with shingles.  Valtrex 1 g 3 times daily is called in.  She will follow-up with Dr. Lainez as needed.

## 2020-04-08 ENCOUNTER — OFFICE VISIT (OUTPATIENT)
Dept: FAMILY MEDICINE CLINIC | Facility: CLINIC | Age: 65
End: 2020-04-08

## 2020-04-08 DIAGNOSIS — I10 ESSENTIAL HYPERTENSION: ICD-10-CM

## 2020-04-08 DIAGNOSIS — B02.23 POST-HERPETIC POLYNEUROPATHY: Primary | ICD-10-CM

## 2020-04-08 DIAGNOSIS — Z79.891 LONG TERM CURRENT USE OF OPIATE ANALGESIC: ICD-10-CM

## 2020-04-08 PROBLEM — B02.9 SHINGLES: Status: ACTIVE | Noted: 2020-04-08

## 2020-04-08 PROCEDURE — 99421 OL DIG E/M SVC 5-10 MIN: CPT | Performed by: INTERNAL MEDICINE

## 2020-04-08 RX ORDER — HYDROCODONE BITARTRATE AND ACETAMINOPHEN 5; 325 MG/1; MG/1
1 TABLET ORAL EVERY 8 HOURS PRN
Qty: 90 TABLET | Refills: 0 | Status: SHIPPED | OUTPATIENT
Start: 2020-04-08 | End: 2020-05-05 | Stop reason: SDUPTHER

## 2020-04-08 RX ORDER — GABAPENTIN 300 MG/1
300 CAPSULE ORAL 3 TIMES DAILY
Qty: 90 CAPSULE | Refills: 2 | Status: SHIPPED | OUTPATIENT
Start: 2020-04-08 | End: 2020-04-14 | Stop reason: DRUGHIGH

## 2020-04-08 NOTE — TELEPHONE ENCOUNTER
PATIENT REQUESTED A REFILL ON:tiotropium (Spiriva HandiHaler) 18 MCG per inhalation capsule    PATIENT CAN BE REACHED ON: [phone]    PHARMACY PREFERRED:[facility]/pharmacy #[id number] - [place], [place] - [facility] [facility] AT [facility] - [phone]  - [phone] FX     PATIENT  STATES THAT [facility] TOLD THEM THAT WE NEED TO CALL [facility] TO GET tiotropium (Spiriva HandiHaler) 18 MCG per inhalation capsule REFILL.

## 2020-04-08 NOTE — PROGRESS NOTES
Subjective   Paz Browne is a 64 y.o. female.     History of Present Illness   You have chosen to receive care through a telephone visit today. Do you consent to use a telephone visit for your medical care today? Yes  18-minute phone call.  Patient with a tele-phone follow-up for shingles.  Patient had a case of shingles and was given Valtrex 1 g 3 times daily for 7 days.  The lesions have been drying up but she has had consistent pain along the dermatome of the lesion.  Patient has been taking hydrocodone without much relief.  Patient was placed on gabapentin 300 mg 3 times daily and asked to follow-up in 1 week.  Patient was warned it could be very sedating and she could possibly fall.    Dictated utilizing Dragon dictation. If there are questions or for further clarification, please contact me.  The following portions of the patient's history were reviewed and updated as appropriate: allergies, current medications, past family history, past medical history, past social history, past surgical history and problem list.    Review of Systems   Constitutional: Negative for fatigue and fever.   HENT: Positive for congestion. Negative for trouble swallowing.    Eyes: Negative for discharge and visual disturbance.   Respiratory: Negative for choking and shortness of breath.    Cardiovascular: Negative for chest pain and palpitations.   Gastrointestinal: Negative for abdominal pain and blood in stool.   Endocrine: Negative.    Genitourinary: Negative for genital sores and hematuria.   Musculoskeletal: Negative for gait problem and joint swelling.   Skin: Negative for color change, pallor, rash and wound.        Shingles   Allergic/Immunologic: Positive for environmental allergies. Negative for immunocompromised state.   Neurological: Negative for facial asymmetry and speech difficulty.   Psychiatric/Behavioral: Negative for hallucinations and suicidal ideas.       Objective   Physical Exam   Constitutional: She is  oriented to person, place, and time.   Pulmonary/Chest: Effort normal and breath sounds normal.   Neurological: She is alert and oriented to person, place, and time.   Skin:   Papular vesicles along her abdominal area   Psychiatric: She has a normal mood and affect. Her behavior is normal. Judgment and thought content normal.   Nursing note and vitals reviewed.      Assessment/Plan #1 continue Valtrex No. 2 begin gabapentin 300 mg 3 times daily and titrate as needed  Diagnoses and all orders for this visit:    Post-herpetic polyneuropathy    Long term current use of opiate analgesic   -     CBC (No Diff); Future  -     Comprehensive Metabolic Panel  -     Lipid Panel  -     Ferritin  -     Vitamin D 25 Hydroxy  -     HYDROcodone-acetaminophen (NORCO) 5-325 MG per tablet; Take 1 tablet by mouth Every 8 (Eight) Hours As Needed for Moderate Pain . Chronic pain medicine for low back pain    Essential hypertension  -     CBC (No Diff); Future  -     Comprehensive Metabolic Panel  -     Lipid Panel  -     Ferritin  -     Vitamin D 25 Hydroxy    Other orders  -     Discontinue: gabapentin (NEURONTIN) 300 MG capsule; Take 1 capsule by mouth 3 (Three) Times a Day. Severe pain from shingles already taking hydrocodone

## 2020-04-08 NOTE — TELEPHONE ENCOUNTER
It looks like when Dr. Martinez refilled this Rx yesterday, he didn't change it to e-scribe, so that is why [facility] is not receiving the fax

## 2020-04-14 ENCOUNTER — TELEPHONE (OUTPATIENT)
Dept: FAMILY MEDICINE CLINIC | Facility: CLINIC | Age: 65
End: 2020-04-14

## 2020-04-14 RX ORDER — GABAPENTIN 600 MG/1
600 TABLET ORAL 3 TIMES DAILY
Qty: 90 TABLET | Refills: 2 | Status: SHIPPED | OUTPATIENT
Start: 2020-04-14 | End: 2020-05-01 | Stop reason: SDUPTHER

## 2020-04-14 NOTE — TELEPHONE ENCOUNTER
PT CALLED STATING SHE IS STILL IN A LOT OF PAIN FROM HER SHINGLES. PT STATES THE MEDICATION PRESCRIBED IS NOT WORKING. PT DOES NOT WANT TO SCHEDULE AN APPOINTMENT     PLEASE ADVISE     PT CALL BACK   [phone]

## 2020-04-17 ENCOUNTER — NURSE TRIAGE (OUTPATIENT)
Dept: CALL CENTER | Facility: HOSPITAL | Age: 65
End: 2020-04-17

## 2020-04-17 ENCOUNTER — TELEPHONE (OUTPATIENT)
Dept: FAMILY MEDICINE CLINIC | Facility: CLINIC | Age: 65
End: 2020-04-17

## 2020-04-17 RX ORDER — DOXYCYCLINE HYCLATE 100 MG
100 TABLET ORAL 2 TIMES DAILY
Qty: 20 TABLET | Refills: 0 | Status: SHIPPED | OUTPATIENT
Start: 2020-04-17 | End: 2020-05-05

## 2020-04-17 RX ORDER — DOXYCYCLINE HYCLATE 100 MG
100 TABLET ORAL 2 TIMES DAILY
Qty: 20 TABLET | Refills: 0 | Status: SHIPPED | OUTPATIENT
Start: 2020-04-17 | End: 2020-04-17 | Stop reason: SDUPTHER

## 2020-04-17 NOTE — TELEPHONE ENCOUNTER
"The patient is requesting a call at [phone].  Pt was transferred to the COVID line due to respiratory symptoms.  Patient does not think she has COVID but the beginning of pneumonia.   Patient has SOB, reports wheezing, some coughing and chest tightness.  Patient had to sleep inclined last night.   She has been using spiriva on a regular basis and her nebulizer 2x a day.  She had to use her ER inhaler once at night. She is using her continuous 3l o2.  I had her check her o2 and it showed 95% with a hr of 71 she has no fever.  She would like to speak to DR Martinez because \" this is how she feels when she is getting pneumonia\".    Reason for Disposition  • Chest pain  • HIGH RISK patient (e.g., age > 64 years, diabetes, heart or lung disease, weak immune system)  • COVID-19 Prevention and Healthy Living, questions about    Additional Information  • Negative: SEVERE difficulty breathing (e.g., struggling for each breath, speaks in single words)  • Negative: Difficult to awaken or acting confused (e.g., disoriented, slurred speech)  • Negative: Bluish (or gray) lips or face now  • Negative: Shock suspected (e.g., cold/pale/clammy skin, too weak to stand, low BP, rapid pulse)  • Negative: Sounds like a life-threatening emergency to the triager  • Negative: [1] COVID-19 suspected (e.g., cough, fever, shortness of breath) AND [2] public health department recommends testing  • Negative: [1] COVID-19 exposure AND [2] no symptoms  • Negative: COVID-19 and Breastfeeding, questions about  • Negative: SEVERE or constant chest pain (Exception: mild central chest pain, present only when coughing)  • Negative: MODERATE difficulty breathing (e.g., speaks in phrases, SOB even at rest, pulse 100-120)  • Negative: Patient sounds very sick or weak to the triager  • Negative: MILD difficulty breathing (e.g., minimal/no SOB at rest, SOB with walking, pulse <100)  • Negative: Fever > 103 F (39.4 C)  • Negative: [1] Fever > 101 F (38.3 " "C) AND [2] age > 60  • Negative: [1] Fever > 100.0 F (37.8 C) AND [2] bedridden (e.g., nursing home patient, CVA, chronic illness, recovering from surgery)  • Negative: Fever present > 3 days (72 hours)  • Negative: [1] Fever returns after gone for over 24 hours AND [2] symptoms worse or not improved  • Negative: [1] Continuous (nonstop) coughing interferes with work or school AND [2] no improvement using cough treatment per protocol  • Negative: Cough present > 3 weeks  • Negative: 1] COVID-19 infection diagnosed or suspected AND [2] mild symptoms (fever, cough) AND [2] no trouble breathing or other complications  • Negative: COVID-19, questions about  • Negative: COVID-19 Home Isolation, questions about  • Negative: COVID-19 Testing, questions about    Answer Assessment - Initial Assessment Questions  1. COVID-19 DIAGNOSIS: \"Who made your Coronavirus (COVID-19) diagnosis?\" \"Was it confirmed by a positive lab test?\" If not diagnosed by a HCP, ask \"Are there lots of cases (community spread) where you live?\" (See public health department website, if unsure)    * MAJOR community spread: high number of cases; numbers of cases are increasing; many people hospitalized.    * MINOR community spread: low number of cases; not increasing; few or no people hospitalized      Major   2. ONSET: \"When did the COVID-19 symptoms start?\"       A few days ago   3. WORST SYMPTOM: \"What is your worst symptom?\" (e.g., cough, fever, shortness of breath, muscle aches)      SOB,chest tightness  4. COUGH: \"How bad is the cough?\"        Cough caused some trouble sleeping last night  5. FEVER: \"Do you have a fever?\" If so, ask: \"What is your temperature, how was it measured, and when did it start?\"      No fever   6. RESPIRATORY STATUS: \"Describe your breathing?\" (e.g., shortness of breath, wheezing, unable to speak)       Easily able to carry on a conversation.  Said she had wheezing but I could not hear it during our conversation  7. " "BETTER-SAME-WORSE: \"Are you getting better, staying the same or getting worse compared to yesterday?\"  If getting worse, ask, \"In what way?\"    Worse   8. HIGH RISK DISEASE: \"Do you have any chronic medical problems?\" (e.g., asthma, heart or lung disease, weak immune system, etc.)      Respiratory issues   9. PREGNANCY: \"Is there any chance you are pregnant?\" \"When was your last menstrual period?\"      n/a  10. OTHER SYMPTOMS: \"Do you have any other symptoms?\"  (e.g., runny nose, headache, sore throat, loss of smell)        None    Protocols used: CORONAVIRUS (COVID-19) DIAGNOSED OR SUSPECTED-ADULT-AH      "

## 2020-04-17 NOTE — TELEPHONE ENCOUNTER
Call patient 1:35 PM with no answer.  I left a message she can take her mini neb 4 times a day and I just called in doxycycline 100 mg twice daily

## 2020-04-17 NOTE — TELEPHONE ENCOUNTER
PATIENT CALLED IN AND STATED SHE IS REQUESTING A ANTIBIOTIC AND STEROID AND STATED SHE THINKS ITS PHENOMIA  AND HURTS IN HER CHEST . PHARMACY VERIFIED [facility] [address] . PATIENT CALL BACK [phone].

## 2020-04-23 ENCOUNTER — TELEPHONE (OUTPATIENT)
Dept: FAMILY MEDICINE CLINIC | Facility: CLINIC | Age: 65
End: 2020-04-23

## 2020-04-23 NOTE — TELEPHONE ENCOUNTER
Patient called and stated that she was having some wheezing/coughing issues last week and that Dr. Martinez prescribed her an antibiotic (see 04/17/2020 Meds). Patient stated she was instructed to call and give an update on Thursday, 04/23/2020.    Patient is doing a little better, but she is still experiencing some wheezing, and she is having some trouble sleeping. Patient states she has about a week of the antibiotic medication.    Please advise.  [phone]

## 2020-04-27 RX ORDER — POTASSIUM CHLORIDE 20 MEQ/1
TABLET, EXTENDED RELEASE ORAL
Qty: 270 TABLET | Refills: 1 | Status: SHIPPED | OUTPATIENT
Start: 2020-04-27 | End: 2020-10-21

## 2020-04-29 RX ORDER — OMEPRAZOLE 40 MG/1
CAPSULE, DELAYED RELEASE ORAL
Qty: 90 CAPSULE | Refills: 1 | Status: SHIPPED | OUTPATIENT
Start: 2020-04-29 | End: 2020-10-28

## 2020-05-01 RX ORDER — GABAPENTIN 600 MG/1
600 TABLET ORAL 3 TIMES DAILY
Qty: 270 TABLET | Refills: 1 | Status: ON HOLD | OUTPATIENT
Start: 2020-05-01 | End: 2021-05-10

## 2020-05-05 DIAGNOSIS — Z79.891 LONG TERM CURRENT USE OF OPIATE ANALGESIC: ICD-10-CM

## 2020-05-05 RX ORDER — HYDROCODONE BITARTRATE AND ACETAMINOPHEN 5; 325 MG/1; MG/1
1 TABLET ORAL EVERY 8 HOURS PRN
Qty: 90 TABLET | Refills: 0 | Status: CANCELLED | OUTPATIENT
Start: 2020-05-05

## 2020-05-05 RX ORDER — HYDROCODONE BITARTRATE AND ACETAMINOPHEN 5; 325 MG/1; MG/1
1 TABLET ORAL EVERY 8 HOURS PRN
Qty: 90 TABLET | Refills: 0 | Status: SHIPPED | OUTPATIENT
Start: 2020-05-05 | End: 2020-06-10 | Stop reason: SDUPTHER

## 2020-05-05 NOTE — TELEPHONE ENCOUNTER
PT CALLED TO GET A MED REFILL ON HYDROcodone-acetaminophen (NORCO) 5-325 MG per tablet    PHARMACY CONFIRMED    CALL BACK

## 2020-05-26 ENCOUNTER — OFFICE VISIT (OUTPATIENT)
Dept: FAMILY MEDICINE CLINIC | Facility: CLINIC | Age: 65
End: 2020-05-26

## 2020-05-26 VITALS
BODY MASS INDEX: 20.51 KG/M2 | SYSTOLIC BLOOD PRESSURE: 150 MMHG | WEIGHT: 131 LBS | DIASTOLIC BLOOD PRESSURE: 80 MMHG | HEART RATE: 78 BPM

## 2020-05-26 DIAGNOSIS — I10 ESSENTIAL HYPERTENSION: Primary | ICD-10-CM

## 2020-05-26 DIAGNOSIS — R79.9 ABNORMAL FINDING OF BLOOD CHEMISTRY, UNSPECIFIED: ICD-10-CM

## 2020-05-26 DIAGNOSIS — E78.2 MIXED HYPERLIPIDEMIA: ICD-10-CM

## 2020-05-26 DIAGNOSIS — E55.9 VITAMIN D DEFICIENCY, UNSPECIFIED: ICD-10-CM

## 2020-05-26 PROCEDURE — 99443 PR PHYS/QHP TELEPHONE EVALUATION 21-30 MIN: CPT | Performed by: INTERNAL MEDICINE

## 2020-05-26 RX ORDER — LISINOPRIL 10 MG/1
10 TABLET ORAL DAILY
Qty: 30 TABLET | Refills: 3 | Status: SHIPPED | OUTPATIENT
Start: 2020-05-26 | End: 2020-06-16 | Stop reason: SDUPTHER

## 2020-05-26 NOTE — PROGRESS NOTES
Subjective   Paz Browne is a 64 y.o. female.     History of Present Illness   You have chosen to receive care through a telephone visit. Do you consent to use a telephone visit for your medical care today? Yes  Patient had a 21-minute phone encounter.  Patient's blood pressures ranging in the 150s over 90s.  Patient is unable to exercise or get out of her house because of COVID.  Patient's lisinopril was increased from 5 to 10 mg daily and she will monitor blood pressure.  Patient will follow-up in 1 month with readings.  Patient will try increase her ambulation as she can.  Patient's lipids are being treated with her diet exercise and a statin.  Triglycerides 230, HDL 55, LDL 77.  Patient does have a vitamin D deficiency is being supplemented with over-the-counter D3.  Patient is also taking iron for iron deficiency anemia.  Patient will follow-up with labs as soon as possible.    Dictated utilizing Dragon dictation. If there are questions or for further clarification, please contact me.  The following portions of the patient's history were reviewed and updated as appropriate: allergies, current medications, past family history, past medical history, past social history, past surgical history and problem list.    Review of Systems   Constitutional: Negative for fatigue and fever.   HENT: Positive for congestion. Negative for trouble swallowing.    Eyes: Negative for discharge and visual disturbance.   Respiratory: Negative for choking and shortness of breath.    Cardiovascular: Negative for chest pain and palpitations.   Gastrointestinal: Negative for abdominal pain and blood in stool.   Endocrine: Negative.    Genitourinary: Negative for genital sores and hematuria.   Musculoskeletal: Negative for gait problem and joint swelling.   Skin: Negative for color change, pallor, rash and wound.   Allergic/Immunologic: Positive for environmental allergies. Negative for immunocompromised state.   Neurological:  Negative for facial asymmetry and speech difficulty.   Psychiatric/Behavioral: Negative for hallucinations and suicidal ideas.       Objective   Physical Exam   Constitutional: She is oriented to person, place, and time.   Pulmonary/Chest: Effort normal and breath sounds normal.   Neurological: She is alert and oriented to person, place, and time.   Psychiatric: She has a normal mood and affect. Her behavior is normal. Judgment and thought content normal.       Assessment/Plan #1 monitor blood pressure #2 to increase lisinopril 10 mg daily #3 increase ambulation as able to.  #4 labs #5 return to clinic in 1 month  Diagnoses and all orders for this visit:    Essential hypertension  -     CBC (No Diff); Future  -     Comprehensive Metabolic Panel  -     Hemoglobin A1c  -     Ferritin  -     Lipid Panel  -     Vitamin D 25 Hydroxy    Mixed hyperlipidemia  -     CBC (No Diff); Future  -     Comprehensive Metabolic Panel  -     Hemoglobin A1c  -     Ferritin  -     Lipid Panel  -     Vitamin D 25 Hydroxy    Abnormal finding of blood chemistry, unspecified   -     Hemoglobin A1c  -     Ferritin    Vitamin D deficiency, unspecified   -     Vitamin D 25 Hydroxy    Other orders  -     lisinopril (PRINIVIL,ZESTRIL) 10 MG tablet; Take 1 tablet by mouth Daily.

## 2020-06-05 DIAGNOSIS — F51.01 PRIMARY INSOMNIA: ICD-10-CM

## 2020-06-08 RX ORDER — TEMAZEPAM 30 MG/1
CAPSULE ORAL
Qty: 90 CAPSULE | Refills: 1 | Status: SHIPPED | OUTPATIENT
Start: 2020-06-08 | End: 2020-11-06

## 2020-06-08 RX ORDER — SERTRALINE HYDROCHLORIDE 100 MG/1
TABLET, FILM COATED ORAL
Qty: 90 TABLET | Refills: 1 | Status: SHIPPED | OUTPATIENT
Start: 2020-06-08 | End: 2020-12-14

## 2020-06-09 RX ORDER — LISINOPRIL 5 MG/1
TABLET ORAL
Qty: 90 TABLET | Refills: 0 | OUTPATIENT
Start: 2020-06-09

## 2020-06-10 DIAGNOSIS — Z79.891 LONG TERM CURRENT USE OF OPIATE ANALGESIC: ICD-10-CM

## 2020-06-10 RX ORDER — ROPINIROLE 2 MG/1
TABLET, FILM COATED ORAL
Qty: 90 TABLET | Refills: 2 | Status: SHIPPED | OUTPATIENT
Start: 2020-06-10 | End: 2021-03-08

## 2020-06-10 RX ORDER — HYDROCODONE BITARTRATE AND ACETAMINOPHEN 5; 325 MG/1; MG/1
1 TABLET ORAL EVERY 8 HOURS PRN
Qty: 90 TABLET | Refills: 0 | Status: SHIPPED | OUTPATIENT
Start: 2020-06-10 | End: 2020-07-08 | Stop reason: SDUPTHER

## 2020-06-10 NOTE — TELEPHONE ENCOUNTER
PT CALLED REQUESTING A REFILL FOR HYDROcodone-acetaminophen (NORCO) 5-325 MG per tablet    [facility]     PT CALL BACK   [phone]

## 2020-06-16 RX ORDER — LISINOPRIL 10 MG/1
10 TABLET ORAL DAILY
Qty: 90 TABLET | Refills: 0 | Status: SHIPPED | OUTPATIENT
Start: 2020-06-16 | End: 2020-06-30 | Stop reason: DRUGHIGH

## 2020-06-18 ENCOUNTER — TELEPHONE (OUTPATIENT)
Dept: FAMILY MEDICINE CLINIC | Facility: CLINIC | Age: 65
End: 2020-06-18

## 2020-06-18 RX ORDER — ROFLUMILAST 500 UG/1
500 TABLET ORAL DAILY
Qty: 90 TABLET | Refills: 1 | Status: SHIPPED | OUTPATIENT
Start: 2020-06-18 | End: 2021-05-20 | Stop reason: SDUPTHER

## 2020-06-18 NOTE — TELEPHONE ENCOUNTER
PATIENT REQUESTED TO KNOW IF HER  COULD COME IN AND  A PRESCRIPTION FOR HER TO GET HER  roflumilast (DALIRESP) 500 MCG tablet tablet, PATIENT WOULD LIKE TO GET HER MEDICATION FILLED THROUGH A [facility]. PATIENT REQUESTED THAT WHEN IT IS READY FOR HER  TO GET A CALL.      NUMBER   [phone]

## 2020-06-24 RX ORDER — ALBUTEROL SULFATE 2.5 MG/3ML
SOLUTION RESPIRATORY (INHALATION)
Qty: 150 ML | Refills: 3 | Status: SHIPPED | OUTPATIENT
Start: 2020-06-24 | End: 2020-10-05

## 2020-06-30 ENCOUNTER — OFFICE VISIT (OUTPATIENT)
Dept: FAMILY MEDICINE CLINIC | Facility: CLINIC | Age: 65
End: 2020-06-30

## 2020-06-30 ENCOUNTER — TELEPHONE (OUTPATIENT)
Dept: FAMILY MEDICINE CLINIC | Facility: CLINIC | Age: 65
End: 2020-06-30

## 2020-06-30 DIAGNOSIS — K44.9 HIATAL HERNIA: ICD-10-CM

## 2020-06-30 DIAGNOSIS — I10 ESSENTIAL HYPERTENSION: Primary | ICD-10-CM

## 2020-06-30 PROCEDURE — 99442 PR PHYS/QHP TELEPHONE EVALUATION 11-20 MIN: CPT | Performed by: INTERNAL MEDICINE

## 2020-06-30 RX ORDER — LISINOPRIL 40 MG/1
40 TABLET ORAL DAILY
Qty: 30 TABLET | Refills: 3 | Status: SHIPPED | OUTPATIENT
Start: 2020-06-30 | End: 2020-09-10

## 2020-06-30 NOTE — TELEPHONE ENCOUNTER
Talk to her on the phone today and increase lisinopril to 40 mg daily.  Needs to call back in 1 month what blood pressures are

## 2020-06-30 NOTE — PROGRESS NOTES
Subjective   Paz Browne is a 65 y.o. female.     History of Present Illness   .You have chosen to receive care through a telephone visit. Do you consent to use a telephone visit for your medical care today? Yes  Patient had a 15-minute phone encounter.  Patient was seen for hypertension.  Blood pressure at home is been running in the 180s to 130s over 80s.  Patient was instructed if systolic pressure should be less than 140 and diastolic less than 90.  Patient had her lisinopril increased to 40 mg daily.  She will continue to monitor blood pressure and follow-up in 1 month.  Patient's hiatal hernia has been treated with a PPI.  Patient has been stable on this medication.    Dictated utilizing Dragon dictation. If there are questions or for further clarification, please contact me.  The following portions of the patient's history were reviewed and updated as appropriate: allergies, current medications, past family history, past medical history, past social history, past surgical history and problem list.    Review of Systems   Constitutional: Negative for fatigue and fever.   HENT: Positive for congestion. Negative for trouble swallowing.    Eyes: Negative for discharge and visual disturbance.   Respiratory: Negative for choking and shortness of breath.    Cardiovascular: Negative for chest pain and palpitations.   Gastrointestinal: Negative for abdominal pain and blood in stool.   Endocrine: Negative.    Genitourinary: Negative for genital sores and hematuria.   Musculoskeletal: Negative for gait problem and joint swelling.   Skin: Negative for color change, pallor, rash and wound.   Allergic/Immunologic: Positive for environmental allergies. Negative for immunocompromised state.   Neurological: Negative for facial asymmetry and speech difficulty.   Psychiatric/Behavioral: Negative for hallucinations and suicidal ideas.       Objective   Physical Exam   Constitutional: She is oriented to person, place, and  time.   Pulmonary/Chest: Effort normal and breath sounds normal.   Neurological: She is alert and oriented to person, place, and time.   Psychiatric: She has a normal mood and affect. Her behavior is normal. Judgment and thought content normal.       Assessment/Plan #1 continue to monitor blood pressure #2 increase lisinopril to 40 mg daily #3 follow-up 1 month  Diagnoses and all orders for this visit:    Essential hypertension    Hiatal hernia    Other orders  -     lisinopril (PRINIVIL,ZESTRIL) 40 MG tablet; Take 1 tablet by mouth Daily.

## 2020-06-30 NOTE — TELEPHONE ENCOUNTER
PATIENT CALLED AND STATES SHE JUST HAD A TELEPHONE VISIT WITH DR. LAINEZ, AND NEEDS A TELEPHONE VISIT FOR ONE MONTH FOLLOW UP.    BUT SHE DOES HAVE A QUESTION ABOUT HER  BLOOD PRESSURE READINGS.   SHE STATES HER READING ARE   TODAY 141/63   YESTERDAY 124/61    6/28/2020  168/74    6/27/2020  138/77  6/24/2020   156/74  AND THE REST HAVE BEEN AROUND 142/69    DR. LAINEZ WANTS IT TO /90  AND HE WILL SEND A NEW PRESCRIPTION FOR   lisinopril (PRINIVIL,ZESTRIL) 40 MG tablet ONCE DAILY. SHE JUST WANTS TO MAKE SURE EVERYTHING IS GOOD    [facility]/pharmacy #[id number] - [place], [place] - [address] [place] [address] DRIVE AT [facility] - [phone]  - [phone]   [phone]    PATIENT CALL BACK NUMBER [phone]

## 2020-07-01 ENCOUNTER — TELEPHONE (OUTPATIENT)
Dept: FAMILY MEDICINE CLINIC | Facility: CLINIC | Age: 65
End: 2020-07-01

## 2020-07-01 NOTE — TELEPHONE ENCOUNTER
PATIENT CALLED IN TO REQUEST A TELEPHONE VISIT SCHEDULED BY THE END OF AUGUST .  HUB DIDN'T SEE ANY OPENINGS . PLEASE CALL PATIENT TO SCHEDULE [phone].  PATIENT GIVES OKAY TO LEAVE INFORMATION ON ANSWERING MACHINE .

## 2020-07-01 NOTE — TELEPHONE ENCOUNTER
PATIENT ALSO WANTED TO KNOW SHE WAS PUT ON lisinopril (PRINIVIL,ZESTRIL) 40 MG tablet  AND HER BLOOD PRESSURE IS    140/90  . PATIENT WANTS TO KNOW IF SHE SHOULD CONTINUE WITH THE 40 OR GO TO THE 20 MG     PATIENT CALL BACK [phone]  PATIENT GIVES PERMISSION TO LEAVE INFORMATION ON ANSWERING MACHINE

## 2020-07-02 RX ORDER — CARVEDILOL 6.25 MG/1
TABLET ORAL
Qty: 180 TABLET | Refills: 1 | Status: SHIPPED | OUTPATIENT
Start: 2020-07-02 | End: 2020-12-17

## 2020-07-08 DIAGNOSIS — Z79.891 LONG TERM CURRENT USE OF OPIATE ANALGESIC: ICD-10-CM

## 2020-07-08 RX ORDER — HYDROCODONE BITARTRATE AND ACETAMINOPHEN 5; 325 MG/1; MG/1
1 TABLET ORAL EVERY 8 HOURS PRN
Qty: 90 TABLET | Refills: 0 | Status: SHIPPED | OUTPATIENT
Start: 2020-07-08 | End: 2020-08-06 | Stop reason: SDUPTHER

## 2020-07-08 NOTE — TELEPHONE ENCOUNTER
DELETE AFTER REVIEWING: Telephone encounter to be sent to the clinical pool.  If patient has less than a 3 day supply left, send the encounter HIGH Priority.    Caller: PavanPaz    Relationship: Self    Best call back number: [phone]     Medication needed:   Requested Prescriptions     Pending Prescriptions Disp Refills   • HYDROcodone-acetaminophen (NORCO) 5-325 MG per tablet 90 tablet 0     Sig: Take 1 tablet by mouth Every 8 (Eight) Hours As Needed for Moderate Pain . Chronic pain medicine for low back pain       When do you need the refill by: AS SOON AS POSSIBLE    What details did the patient provide when requesting the medication: N/A    Does the patient have less than a 3 day supply:  [] Yes  [x] No    What is the patient's preferred pharmacy:    [facility]/pharmacy #[id number] - [place], [place] - [address] [place] [address] - [phone]  - [phone] FX             DELETE AFTER READING TO PATIENT: “Thank you for sharing this information with me. I will send a message to the clinical team. Please allow 48 hours for the clinical staff to follow up on this request.”

## 2020-07-09 ENCOUNTER — TELEPHONE (OUTPATIENT)
Dept: FAMILY MEDICINE CLINIC | Facility: CLINIC | Age: 65
End: 2020-07-09

## 2020-07-09 RX ORDER — AZITHROMYCIN 250 MG/1
TABLET, FILM COATED ORAL
Qty: 6 TABLET | Refills: 0 | Status: SHIPPED | OUTPATIENT
Start: 2020-07-09 | End: 2020-07-13

## 2020-07-09 NOTE — TELEPHONE ENCOUNTER
PATIENT CALLED FOR MED REQUEST    SHE STATES THAT WHEN SHE WALKS AROUND HER OXYGEN GOES DOWN AND HER HEART RATE GOES UP. SHE HAS A HARD TIME BREATHING WHEN SHE WALKS BUT FINE WHEN SHE IS SITTING DOWN. SHE IS REQUESTING AN ANTIBIOTIC OR PREDNISONE. THIS HAS HAPPENED BEFORE.    [id number] - [place], [place] - [address] [facility] AT [facility] - [phone]  - [phone]   [phone]    PATIENT CALL BACK NUMBER [phone]

## 2020-07-13 ENCOUNTER — TELEPHONE (OUTPATIENT)
Dept: FAMILY MEDICINE CLINIC | Facility: CLINIC | Age: 65
End: 2020-07-13

## 2020-07-13 RX ORDER — DOXYCYCLINE HYCLATE 100 MG
100 TABLET ORAL 2 TIMES DAILY
Qty: 20 TABLET | Refills: 0 | Status: SHIPPED | OUTPATIENT
Start: 2020-07-13 | End: 2020-10-06

## 2020-07-13 NOTE — TELEPHONE ENCOUNTER
PATIENT IS WANTING TO LEAVE A MESSAGE FOR . SHE WOULD LIKE FOR HIM TO CALL IN AN ANTIBIOTIC AND STEROID MEDICINE FOR HER. SHE IS HAVING SYMPTOMS: SHORTNESS OF BREATHE AND SHE KEEPS LOOSING HER VOICE.   PLEASE GIVE HER A CALL TO LET HER KNOW IF HE IS UNABLE TO SEND IN TO PHARMACY ON FILE.    [facility] CONATCT #:[phone]

## 2020-07-20 RX ORDER — FERROUS SULFATE 325(65) MG
TABLET ORAL
Qty: 180 TABLET | Refills: 1 | Status: SHIPPED | OUTPATIENT
Start: 2020-07-20 | End: 2021-03-10

## 2020-07-21 RX ORDER — LISINOPRIL 5 MG/1
TABLET ORAL
Qty: 90 TABLET | Refills: 0 | OUTPATIENT
Start: 2020-07-21

## 2020-07-22 ENCOUNTER — HOSPITAL ENCOUNTER (OUTPATIENT)
Dept: CT IMAGING | Facility: HOSPITAL | Age: 65
Discharge: HOME | End: 2020-07-22
Admitting: INTERNAL MEDICINE

## 2020-07-22 DIAGNOSIS — R91.1 LUNG NODULE: ICD-10-CM

## 2020-07-22 PROCEDURE — 71250 CT THORAX DX C-: CPT

## 2020-08-06 DIAGNOSIS — Z79.891 LONG TERM CURRENT USE OF OPIATE ANALGESIC: ICD-10-CM

## 2020-08-06 RX ORDER — HYDROCODONE BITARTRATE AND ACETAMINOPHEN 5; 325 MG/1; MG/1
1 TABLET ORAL EVERY 8 HOURS PRN
Qty: 90 TABLET | Refills: 0 | Status: SHIPPED | OUTPATIENT
Start: 2020-08-06 | End: 2020-09-08 | Stop reason: SDUPTHER

## 2020-08-06 NOTE — TELEPHONE ENCOUNTER
Caller: Paz Browne    Relationship: Self    Best call back number: [phone]  Medication needed:   Requested Prescriptions     Pending Prescriptions Disp Refills   • HYDROcodone-acetaminophen (NORCO) 5-325 MG per tablet 90 tablet 0     Sig: Take 1 tablet by mouth Every 8 (Eight) Hours As Needed for Moderate Pain . Chronic pain medicine for low back pain   [facility]/PHARMACY #[id number] - [address] - [phone] [facility] [phone] FX

## 2020-08-26 ENCOUNTER — OFFICE VISIT (OUTPATIENT)
Dept: FAMILY MEDICINE CLINIC | Facility: CLINIC | Age: 65
End: 2020-08-26

## 2020-08-26 VITALS
HEART RATE: 75 BPM | BODY MASS INDEX: 21.45 KG/M2 | DIASTOLIC BLOOD PRESSURE: 80 MMHG | SYSTOLIC BLOOD PRESSURE: 130 MMHG | WEIGHT: 137 LBS

## 2020-08-26 DIAGNOSIS — J44.1 COPD EXACERBATION (HCC): ICD-10-CM

## 2020-08-26 DIAGNOSIS — E55.9 VITAMIN D DEFICIENCY, UNSPECIFIED: ICD-10-CM

## 2020-08-26 DIAGNOSIS — I10 ESSENTIAL HYPERTENSION: Primary | ICD-10-CM

## 2020-08-26 PROCEDURE — 99441 PR PHYS/QHP TELEPHONE EVALUATION 5-10 MIN: CPT | Performed by: INTERNAL MEDICINE

## 2020-08-26 NOTE — PROGRESS NOTES
Subjective   Paz Browne is a 65 y.o. female.     History of Present Illness   You have chosen to receive care through a telephone visit. Do you consent to use a telephone visit for your medical care today? Yes  Patient have a 10-minute phone encounter.  Patient's blood pressures been running in the 130s over 80s.  Patient COPD has been controlled with her bronchodilators.  Patient does have vitamin D3 deficiency supplemented with over-the-counter D3.    Dictated utilizing Dragon dictation. If there are questions or for further clarification, please contact me.  The following portions of the patient's history were reviewed and updated as appropriate: allergies, current medications, past family history, past medical history, past social history, past surgical history and problem list.    Review of Systems   Constitutional: Negative for fatigue and fever.   HENT: Positive for congestion. Negative for trouble swallowing.    Eyes: Negative for discharge and visual disturbance.   Respiratory: Negative for choking and shortness of breath.    Cardiovascular: Negative for chest pain and palpitations.   Gastrointestinal: Negative for abdominal pain and blood in stool.   Endocrine: Negative.    Genitourinary: Negative for genital sores and hematuria.   Musculoskeletal: Negative for gait problem and joint swelling.   Skin: Negative for color change, pallor, rash and wound.   Allergic/Immunologic: Positive for environmental allergies. Negative for immunocompromised state.   Neurological: Negative for facial asymmetry and speech difficulty.   Psychiatric/Behavioral: Negative for hallucinations and suicidal ideas.       Objective   Physical Exam   Constitutional: She is oriented to person, place, and time.   Pulmonary/Chest: Effort normal and breath sounds normal.   Neurological: She is alert and oriented to person, place, and time.   Psychiatric: She has a normal mood and affect. Her behavior is normal. Judgment and thought  content normal.       Assessment/Plan #1 continue present monitoring of blood pressure #2 labs #3 follow-up 4 months  Diagnoses and all orders for this visit:    Essential hypertension  -     CBC (No Diff); Future  -     Comprehensive Metabolic Panel  -     Lipid Panel  -     Vitamin D 25 Hydroxy  -     Magnesium    COPD exacerbation (CMS/HCC)  -     CBC (No Diff); Future  -     Comprehensive Metabolic Panel  -     Lipid Panel  -     Vitamin D 25 Hydroxy  -     Magnesium    Vitamin D deficiency, unspecified   -     Vitamin D 25 Hydroxy

## 2020-09-08 DIAGNOSIS — Z79.891 LONG TERM CURRENT USE OF OPIATE ANALGESIC: ICD-10-CM

## 2020-09-08 RX ORDER — HYDROCODONE BITARTRATE AND ACETAMINOPHEN 5; 325 MG/1; MG/1
1 TABLET ORAL EVERY 8 HOURS PRN
Qty: 90 TABLET | Refills: 0 | Status: SHIPPED | OUTPATIENT
Start: 2020-09-08 | End: 2020-10-06 | Stop reason: SDUPTHER

## 2020-09-08 NOTE — TELEPHONE ENCOUNTER
Patient called in requesting a re-fill for     HYDROcodone-acetaminophen (NORCO) 5-325 MG per tablet    [address]    Best call back # [phone]

## 2020-09-10 RX ORDER — LISINOPRIL 40 MG/1
TABLET ORAL
Qty: 90 TABLET | Refills: 1 | Status: SHIPPED | OUTPATIENT
Start: 2020-09-10 | End: 2021-03-08

## 2020-09-14 RX ORDER — FUROSEMIDE 20 MG/1
TABLET ORAL
Qty: 90 TABLET | Refills: 1 | Status: ON HOLD | OUTPATIENT
Start: 2020-09-14 | End: 2021-05-10

## 2020-09-14 RX ORDER — ATORVASTATIN CALCIUM 40 MG/1
TABLET, FILM COATED ORAL
Qty: 90 TABLET | Refills: 2 | Status: SHIPPED | OUTPATIENT
Start: 2020-09-14 | End: 2021-05-24

## 2020-09-21 RX ORDER — MAGNESIUM OXIDE 400 MG/1
TABLET ORAL
Qty: 90 TABLET | Refills: 1 | Status: SHIPPED | OUTPATIENT
Start: 2020-09-21 | End: 2021-03-23

## 2020-09-23 ENCOUNTER — TELEPHONE (OUTPATIENT)
Dept: FAMILY MEDICINE CLINIC | Facility: CLINIC | Age: 65
End: 2020-09-23

## 2020-09-23 RX ORDER — SOFT LENS DISINFECTANT
1 SOLUTION, NON-ORAL MISCELLANEOUS 4 TIMES DAILY PRN
Qty: 1 EACH | Refills: 0 | Status: SHIPPED | OUTPATIENT
Start: 2020-09-23

## 2020-09-23 NOTE — TELEPHONE ENCOUNTER
PATIENT  CALLED TO REQUEST A PRESCRIPTION FOR A NEW NEBULIZER.   [facility] PHONE NUMBER IS  [phone]

## 2020-09-30 ENCOUNTER — HOSPITAL ENCOUNTER (EMERGENCY)
Facility: HOSPITAL | Age: 65
Discharge: HOME | End: 2020-09-30
Attending: EMERGENCY MEDICINE | Admitting: EMERGENCY MEDICINE

## 2020-09-30 ENCOUNTER — APPOINTMENT (OUTPATIENT)
Dept: CT IMAGING | Facility: HOSPITAL | Age: 65
End: 2020-09-30

## 2020-09-30 ENCOUNTER — APPOINTMENT (OUTPATIENT)
Dept: GENERAL RADIOLOGY | Facility: HOSPITAL | Age: 65
End: 2020-09-30

## 2020-09-30 VITALS
DIASTOLIC BLOOD PRESSURE: 65 MMHG | TEMPERATURE: 98.6 F | SYSTOLIC BLOOD PRESSURE: 110 MMHG | BODY MASS INDEX: 21.97 KG/M2 | WEIGHT: 140 LBS | HEART RATE: 86 BPM | HEIGHT: 67 IN | RESPIRATION RATE: 18 BRPM | OXYGEN SATURATION: 100 %

## 2020-09-30 DIAGNOSIS — R42 DIZZINESS OF UNKNOWN ETIOLOGY: Primary | ICD-10-CM

## 2020-09-30 LAB
ALBUMIN SERPL-MCNC: 4.8 G/DL (ref 3.5–5.2)
ALBUMIN/GLOB SERPL: 2.1 G/DL
ALP SERPL-CCNC: 71 U/L (ref 39–117)
ALT SERPL W P-5'-P-CCNC: 16 U/L (ref 1–33)
ANION GAP SERPL CALCULATED.3IONS-SCNC: 7.3 MMOL/L (ref 5–15)
AST SERPL-CCNC: 19 U/L (ref 1–32)
BACTERIA UR QL AUTO: ABNORMAL /HPF
BASOPHILS # BLD AUTO: 0.04 10*3/MM3 (ref 0–0.2)
BASOPHILS NFR BLD AUTO: 0.5 % (ref 0–1.5)
BILIRUB SERPL-MCNC: 0.5 MG/DL (ref 0–1.2)
BILIRUB UR QL STRIP: NEGATIVE
BUN SERPL-MCNC: 17 MG/DL (ref 8–23)
BUN/CREAT SERPL: 15.5 (ref 7–25)
CALCIUM SPEC-SCNC: 9.8 MG/DL (ref 8.6–10.5)
CHLORIDE SERPL-SCNC: 100 MMOL/L (ref 98–107)
CLARITY UR: ABNORMAL
CO2 SERPL-SCNC: 32.7 MMOL/L (ref 22–29)
COLOR UR: YELLOW
CREAT SERPL-MCNC: 1.1 MG/DL (ref 0.57–1)
DEPRECATED RDW RBC AUTO: 42.2 FL (ref 37–54)
EOSINOPHIL # BLD AUTO: 0.12 10*3/MM3 (ref 0–0.4)
EOSINOPHIL NFR BLD AUTO: 1.5 % (ref 0.3–6.2)
ERYTHROCYTE [DISTWIDTH] IN BLOOD BY AUTOMATED COUNT: 12.8 % (ref 12.3–15.4)
GFR SERPL CREATININE-BSD FRML MDRD: 50 ML/MIN/1.73
GLOBULIN UR ELPH-MCNC: 2.3 GM/DL
GLUCOSE BLDC GLUCOMTR-MCNC: 108 MG/DL (ref 70–130)
GLUCOSE SERPL-MCNC: 114 MG/DL (ref 65–99)
GLUCOSE UR STRIP-MCNC: NEGATIVE MG/DL
HCT VFR BLD AUTO: 33.1 % (ref 34–46.6)
HGB BLD-MCNC: 11.4 G/DL (ref 12–15.9)
HGB UR QL STRIP.AUTO: NEGATIVE
HOLD SPECIMEN: NORMAL
HOLD SPECIMEN: NORMAL
HYALINE CASTS UR QL AUTO: ABNORMAL /LPF
IMM GRANULOCYTES # BLD AUTO: 0.03 10*3/MM3 (ref 0–0.05)
IMM GRANULOCYTES NFR BLD AUTO: 0.4 % (ref 0–0.5)
KETONES UR QL STRIP: NEGATIVE
LEUKOCYTE ESTERASE UR QL STRIP.AUTO: ABNORMAL
LYMPHOCYTES # BLD AUTO: 1.32 10*3/MM3 (ref 0.7–3.1)
LYMPHOCYTES NFR BLD AUTO: 16.4 % (ref 19.6–45.3)
MAGNESIUM SERPL-MCNC: 1.8 MG/DL (ref 1.6–2.4)
MCH RBC QN AUTO: 31.1 PG (ref 26.6–33)
MCHC RBC AUTO-ENTMCNC: 34.4 G/DL (ref 31.5–35.7)
MCV RBC AUTO: 90.2 FL (ref 79–97)
MONOCYTES # BLD AUTO: 0.27 10*3/MM3 (ref 0.1–0.9)
MONOCYTES NFR BLD AUTO: 3.3 % (ref 5–12)
NEUTROPHILS NFR BLD AUTO: 6.28 10*3/MM3 (ref 1.7–7)
NEUTROPHILS NFR BLD AUTO: 77.9 % (ref 42.7–76)
NITRITE UR QL STRIP: NEGATIVE
NRBC BLD AUTO-RTO: 0 /100 WBC (ref 0–0.2)
PH UR STRIP.AUTO: 6 [PH] (ref 5–8)
PLATELET # BLD AUTO: 219 10*3/MM3 (ref 140–450)
PMV BLD AUTO: 10.1 FL (ref 6–12)
POTASSIUM SERPL-SCNC: 4.7 MMOL/L (ref 3.5–5.2)
PROT SERPL-MCNC: 7.1 G/DL (ref 6–8.5)
PROT UR QL STRIP: NEGATIVE
RBC # BLD AUTO: 3.67 10*6/MM3 (ref 3.77–5.28)
RBC # UR: ABNORMAL /HPF
REF LAB TEST METHOD: ABNORMAL
SODIUM SERPL-SCNC: 140 MMOL/L (ref 136–145)
SP GR UR STRIP: 1.01 (ref 1–1.03)
SQUAMOUS #/AREA URNS HPF: ABNORMAL /HPF
TROPONIN T SERPL-MCNC: <0.01 NG/ML (ref 0–0.03)
UROBILINOGEN UR QL STRIP: ABNORMAL
WBC # BLD AUTO: 8.06 10*3/MM3 (ref 3.4–10.8)
WBC UR QL AUTO: ABNORMAL /HPF
WHOLE BLOOD HOLD SPECIMEN: NORMAL
WHOLE BLOOD HOLD SPECIMEN: NORMAL

## 2020-09-30 PROCEDURE — 70450 CT HEAD/BRAIN W/O DYE: CPT

## 2020-09-30 PROCEDURE — 84484 ASSAY OF TROPONIN QUANT: CPT | Performed by: EMERGENCY MEDICINE

## 2020-09-30 PROCEDURE — 85025 COMPLETE CBC W/AUTO DIFF WBC: CPT | Performed by: EMERGENCY MEDICINE

## 2020-09-30 PROCEDURE — 93010 ELECTROCARDIOGRAM REPORT: CPT | Performed by: INTERNAL MEDICINE

## 2020-09-30 PROCEDURE — 93005 ELECTROCARDIOGRAM TRACING: CPT | Performed by: EMERGENCY MEDICINE

## 2020-09-30 PROCEDURE — 82962 GLUCOSE BLOOD TEST: CPT

## 2020-09-30 PROCEDURE — 81001 URINALYSIS AUTO W/SCOPE: CPT | Performed by: EMERGENCY MEDICINE

## 2020-09-30 PROCEDURE — 80053 COMPREHEN METABOLIC PANEL: CPT | Performed by: EMERGENCY MEDICINE

## 2020-09-30 PROCEDURE — 36415 COLL VENOUS BLD VENIPUNCTURE: CPT

## 2020-09-30 PROCEDURE — 99284 EMERGENCY DEPT VISIT MOD MDM: CPT

## 2020-09-30 PROCEDURE — 71046 X-RAY EXAM CHEST 2 VIEWS: CPT

## 2020-09-30 PROCEDURE — 83735 ASSAY OF MAGNESIUM: CPT | Performed by: EMERGENCY MEDICINE

## 2020-09-30 RX ORDER — SODIUM CHLORIDE 0.9 % (FLUSH) 0.9 %
10 SYRINGE (ML) INJECTION AS NEEDED
Status: DISCONTINUED | OUTPATIENT
Start: 2020-09-30 | End: 2020-10-01 | Stop reason: HOSPADM

## 2020-10-01 ENCOUNTER — TELEPHONE (OUTPATIENT)
Dept: FAMILY MEDICINE CLINIC | Facility: CLINIC | Age: 65
End: 2020-10-01

## 2020-10-01 ENCOUNTER — PATIENT OUTREACH (OUTPATIENT)
Dept: CASE MANAGEMENT | Facility: OTHER | Age: 65
End: 2020-10-01

## 2020-10-01 ENCOUNTER — EPISODE CHANGES (OUTPATIENT)
Dept: CASE MANAGEMENT | Facility: OTHER | Age: 65
End: 2020-10-01

## 2020-10-01 NOTE — OUTREACH NOTE
"Patient Outreach Note  Contacted related to yesterday's ED visit for dizziness with negative work-up from ED.  Reports no issues today and has placed a call to Dr. Martinez and awaiting a return call.  Reports \"years ago,\" diagnosed with vertigo and medication prescribed was used intermittently and provided relief and feels this recent episode is similar and may need medication prescribed again from PCP.  Education related to fall preventive measures in particular with positional changes.  Discussed routine health care maintenance needs and plans for flu shot at her pharmacy or MD office.  Has not had a mammogram in years and will schedule \"when things calm down.\"  Minimal outings during pandemic and has been informed by her pulmonologist, Dr. Melo, of need for extreme precautions due to her COPD and HF comorbidities and she has been adherent to his advise. Denies further needs, concerns, or additional community resource information.   Sharon Nagy RN  Ambulatory     10/1/2020, 11:02 EDT      "

## 2020-10-01 NOTE — ED NOTES
"Pt states she was sitting on her couch when she felt dizzy states \"I just feel weird\" pt says she has a history of vertigo but isnt taking her medications like she should. Says she heard a loud noise right before and family found her shaking.     Patient was placed in face mask during first look triage.  Patient was wearing a face mask throughout encounter.  I wore personal protective equipment throughout the encounter.  Hand hygiene was performed before and after patient encounter.         Elen Crowell RN  09/30/20 2035    "

## 2020-10-01 NOTE — ED PROVIDER NOTES
" EMERGENCY DEPARTMENT ENCOUNTER    Room Number:  15/15  Date of encounter:  9/30/2020  PCP: Javan Martinez MD  Historian: Patient     I used full protective equipment while examining this patient.  This includes face mask, gloves and protective eyewear.  I washed my hands before entering the room and immediately upon leaving the room      HPI:  Chief Complaint: Loud noise in head and \"felt weird\"  A complete HPI/ROS/PMH/PSH/SH/FH are unobtainable due to: None    Context: Paz Browne is a 65 y.o. female who presents to the ED c/o loud noise in head and felt \"weird\".  Patient had 2 episodes today around 4:00 and then about 10 minutes later.  Episode lasted a few minutes at most.  She states that she felt a loud noise in her head and then felt \"weird\".  Symptoms lasted for 1 or 2 minutes and resolved on their own.  She feels back to normal now.  She denies any headache.  She states she had a similar episode about 2 weeks ago but is unsure of the cause.  She denies any chest pain.  She has chronic shortness of breath that is unchanged from baseline.  She is on 3 L of oxygen around-the-clock.      PAST MEDICAL HISTORY  Active Ambulatory Problems     Diagnosis Date Noted   • PAF (paroxysmal atrial fibrillation) (CMS/[facility]) 01/25/2016   • Hypertension    • Hyperlipidemia    • Pain medication agreement 05/04/2016   • Hiatal hernia 05/04/2016   • Primary osteoarthritis involving multiple joints 05/04/2016   • Pulmonary hypertension (CMS/[facility])    • S/P TVR (tricuspid valve repair) 07/07/2016   • Long term current use of anticoagulant 10/13/2016   • Pulmonary nodule 10/13/2016   • Hemoptysis 10/14/2016   • RLS (restless legs syndrome) 11/18/2016   • Chronic low back pain 08/02/2017   • Dysplastic polyp of colon 08/07/2017   • History of mitral valve replacement with tissue graft 08/11/2017   • Chronic respiratory failure with hypoxia (CMS/HCC) 08/13/2017   • Anemia 08/09/2017   • Celiac artery stenosis (CMS/HCC) " 08/24/2017   • Intertrigo 08/25/2017   • COPD exacerbation (CMS/HCC) 06/30/2019   • Abnormal EKG 06/30/2019   • Shingles 04/08/2020     Resolved Ambulatory Problems     Diagnosis Date Noted   • Tachycardia    • Renal failure    • Palpitations    • Mitral regurgitation    • Heart failure (CMS/HCC) 06/08/2016   • Aspiration pneumonia (CMS/HCC) 10/14/2016   • Lower GI bleed 08/09/2017   • Precordial pain 08/11/2017   • Oral candidiasis 08/14/2017   • VAN (acute kidney injury) (CMS/HCC) 08/15/2017   • GI bleed 12/01/2017   • Acute exacerbation of chronic obstructive pulmonary disease (COPD) (CMS/HCC) 01/02/2018   • Pneumonia due to infectious organism 07/18/2018   • Insomnia due to medical condition 02/01/2019     Past Medical History:   Diagnosis Date   • Atrial flutter (CMS/HCC)    • Cataract    • Colon polyp    • COPD (chronic obstructive pulmonary disease) (CMS/HCC)    • History of CHF (congestive heart failure)    • Long term (current) use of anticoagulants    • Pneumonia          PAST SURGICAL HISTORY  Past Surgical History:   Procedure Laterality Date   • CARDIAC CATHETERIZATION  09/01/2014    Right dominant systemt, normal coronary arteries.    • CARDIAC CATHETERIZATION Left 6/10/2016    Procedure: Cardiac catheterization;  Surgeon: Sergei Hall MD;  Location: [facility] CATH INVASIVE LOCATION;  Service:    • CARDIAC CATHETERIZATION N/A 6/10/2016    Procedure: Right Heart Cath;  Surgeon: Sergei Hall MD;  Location: [facility] CATH INVASIVE LOCATION;  Service:    • CATARACT EXTRACTION     • COLONOSCOPY     • COLONOSCOPY N/A 8/4/2017    Procedure: COLONOSCOPY TO CECUM/TI WITH POLYPECTOMY ( COLD BX);  Surgeon: Cleveland Devine MD;  Location: [facility] ENDOSCOPY;  Service:    • COLONOSCOPY N/A 8/10/2017    Procedure: COLONOSCOPY to cecum and TI with 2 clips placed at transverse;  Surgeon: Earnest PALOMO MD;  Location: [facility] ENDOSCOPY;  Service:    • COLONOSCOPY N/A 12/22/2017    Procedure: COLONOSCOPY INTO CECUM WITH COLD  POLYPECTOMIES;  Surgeon: Cleveland Devine MD;  Location: [facility] ENDOSCOPY;  Service:    • ENDOSCOPY N/A 2017    Procedure: ESOPHAGOGASTRODUODENOSCOPY;  Surgeon: Porsha Ruby MD;  Location: [facility] ENDOSCOPY;  Service:    • ENDOSCOPY N/A 2017    Procedure: ESOPHAGOGASTRODUODENOSCOPY WITH BIOPSIES;  Surgeon: Cleveland Devine MD;  Location: [facility] ENDOSCOPY;  Service:    • GALLBLADDER SURGERY     • HEMORRHOIDECTOMY     • HYSTERECTOMY     • KIDNEY SURGERY  2013   • MAZE PROCEDURE     • MITRAL VALVE REPLACEMENT     • TONSILLECTOMY     • TRICUSPID VALVE REPLACEMENT           FAMILY HISTORY  Family History   Adopted: Yes   Problem Relation Age of Onset   • No Known Problems Mother    • No Known Problems Father          SOCIAL HISTORY  Social History     Socioeconomic History   • Marital status:      Spouse name: Not on file   • Number of children: Not on file   • Years of education: Not on file   • Highest education level: Not on file   Tobacco Use   • Smoking status: Former Smoker     Quit date:      Years since quittin.7   • Smokeless tobacco: Never Used   Substance and Sexual Activity   • Alcohol use: No     Comment: caffiene use   • Drug use: No   • Sexual activity: Defer   Lifestyle   • Physical activity     Days per week: Patient refused     Minutes per session: Patient refused   • Stress: Not on file         ALLERGIES  Bupropion, Cephalexin, and Metoprolol       REVIEW OF SYSTEMS  Review of Systems   Constitutional: Negative.  Negative for fever.   HENT: Negative.  Negative for sore throat.    Eyes: Negative.    Respiratory: Positive for shortness of breath (Chronic, unchanged). Negative for cough.    Cardiovascular: Negative.  Negative for chest pain.   Gastrointestinal: Negative.    Genitourinary: Negative.  Negative for dysuria.   Musculoskeletal: Negative.  Negative for back pain.   Skin: Negative.  Negative for rash.   Neurological: Negative.  Negative for headaches.        As per  HPI   All other systems reviewed and are negative.          PHYSICAL EXAM    I have reviewed the triage vital signs and nursing notes.    ED Triage Vitals   Temp Heart Rate Resp BP SpO2   09/30/20 1850 09/30/20 1850 09/30/20 1850 09/30/20 1855 09/30/20 1850   98.6 °F (37 °C) 102 18 148/76 99 %      Temp src Heart Rate Source Patient Position BP Location FiO2 (%)   -- 09/30/20 1850 -- -- --    Monitor          Physical Exam  GENERAL: Alert female no apparent distress oriented x3 and cooperative  HENT: nares patent, atraumatic  EYES: no scleral icterus  CV: regular rhythm, regular rate-no murmur  RESPIRATORY: normal effort, slightly decreased breath sounds bilaterally-saturations 95% on 3 L  ABDOMEN: soft, nontender to palpation  MUSCULOSKELETAL: no deformity-no significant swelling or tenderness to palpation  NEURO: Strength, sensation, and coordination are grossly intact.  Speech and mentation are unremarkable.  Cerebellar testing including finger-to-nose, rapid alternating movements and heel-to-shin is within normal limits.  SKIN: warm, dry-no unusual rashes are noted      LAB RESULTS  Recent Results (from the past 24 hour(s))   POC Glucose Once    Collection Time: 09/30/20  6:53 PM    Specimen: Blood   Result Value Ref Range    Glucose 108 70 - 130 mg/dL   Comprehensive Metabolic Panel    Collection Time: 09/30/20  8:26 PM    Specimen: Blood   Result Value Ref Range    Glucose 114 (H) 65 - 99 mg/dL    BUN 17 8 - 23 mg/dL    Creatinine 1.10 (H) 0.57 - 1.00 mg/dL    Sodium 140 136 - 145 mmol/L    Potassium 4.7 3.5 - 5.2 mmol/L    Chloride 100 98 - 107 mmol/L    CO2 32.7 (H) 22.0 - 29.0 mmol/L    Calcium 9.8 8.6 - 10.5 mg/dL    Total Protein 7.1 6.0 - 8.5 g/dL    Albumin 4.80 3.50 - 5.20 g/dL    ALT (SGPT) 16 1 - 33 U/L    AST (SGOT) 19 1 - 32 U/L    Alkaline Phosphatase 71 39 - 117 U/L    Total Bilirubin 0.5 0.0 - 1.2 mg/dL    eGFR Non African Amer 50 (L) >60 mL/min/1.73    Globulin 2.3 gm/dL    A/G Ratio 2.1 g/dL     BUN/Creatinine Ratio 15.5 7.0 - 25.0    Anion Gap 7.3 5.0 - 15.0 mmol/L   Troponin    Collection Time: 09/30/20  8:26 PM    Specimen: Blood   Result Value Ref Range    Troponin T <0.010 0.000 - 0.030 ng/mL   Magnesium    Collection Time: 09/30/20  8:26 PM    Specimen: Blood   Result Value Ref Range    Magnesium 1.8 1.6 - 2.4 mg/dL   Light Blue Top    Collection Time: 09/30/20  8:26 PM   Result Value Ref Range    Extra Tube hold for add-on    Green Top (Gel)    Collection Time: 09/30/20  8:26 PM   Result Value Ref Range    Extra Tube Hold for add-ons.    Lavender Top    Collection Time: 09/30/20  8:26 PM   Result Value Ref Range    Extra Tube hold for add-on    Gold Top - SST    Collection Time: 09/30/20  8:26 PM   Result Value Ref Range    Extra Tube Hold for add-ons.    CBC Auto Differential    Collection Time: 09/30/20  8:26 PM    Specimen: Blood   Result Value Ref Range    WBC 8.06 3.40 - 10.80 10*3/mm3    RBC 3.67 (L) 3.77 - 5.28 10*6/mm3    Hemoglobin 11.4 (L) 12.0 - 15.9 g/dL    Hematocrit 33.1 (L) 34.0 - 46.6 %    MCV 90.2 79.0 - 97.0 fL    MCH 31.1 26.6 - 33.0 pg    MCHC 34.4 31.5 - 35.7 g/dL    RDW 12.8 12.3 - 15.4 %    RDW-SD 42.2 37.0 - 54.0 fl    MPV 10.1 6.0 - 12.0 fL    Platelets 219 140 - 450 10*3/mm3    Neutrophil % 77.9 (H) 42.7 - 76.0 %    Lymphocyte % 16.4 (L) 19.6 - 45.3 %    Monocyte % 3.3 (L) 5.0 - 12.0 %    Eosinophil % 1.5 0.3 - 6.2 %    Basophil % 0.5 0.0 - 1.5 %    Immature Grans % 0.4 0.0 - 0.5 %    Neutrophils, Absolute 6.28 1.70 - 7.00 10*3/mm3    Lymphocytes, Absolute 1.32 0.70 - 3.10 10*3/mm3    Monocytes, Absolute 0.27 0.10 - 0.90 10*3/mm3    Eosinophils, Absolute 0.12 0.00 - 0.40 10*3/mm3    Basophils, Absolute 0.04 0.00 - 0.20 10*3/mm3    Immature Grans, Absolute 0.03 0.00 - 0.05 10*3/mm3    nRBC 0.0 0.0 - 0.2 /100 WBC   Urinalysis With Microscopic If Indicated (No Culture) - Urine, Clean Catch    Collection Time: 09/30/20  9:04 PM    Specimen: Urine, Clean Catch   Result Value  Ref Range    Color, UA Yellow Yellow, Straw    Appearance, UA Slightly Cloudy (A) Clear    pH, UA 6.0 5.0 - 8.0    Specific Gravity, UA 1.015 1.005 - 1.030    Glucose, UA Negative Negative    Ketones, UA Negative Negative    Bilirubin, UA Negative Negative    Blood, UA Negative Negative    Protein, UA Negative Negative    Leuk Esterase, UA Small (1+) (A) Negative    Nitrite, UA Negative Negative    Urobilinogen, UA 0.2 E.U./dL 0.2 - 1.0 E.U./dL   Urinalysis, Microscopic Only - Urine, Clean Catch    Collection Time: 09/30/20  9:04 PM    Specimen: Urine, Clean Catch   Result Value Ref Range    RBC, UA 0-2 None Seen, 0-2 /HPF    WBC, UA 6-12 (A) None Seen, 0-2 /HPF    Bacteria, UA None Seen None Seen /HPF    Squamous Epithelial Cells, UA 0-2 None Seen, 0-2 /HPF    Hyaline Casts, UA 0-2 None Seen /LPF    Methodology Automated Microscopy        Ordered the above labs and independently reviewed the results.      RADIOLOGY  Xr Chest 2 View    Result Date: 9/30/2020  HISTORY: Weakness, dizziness, COPD and hypertension  COMPARISON: 06/30/2019  2 view(s) obtained.  FINDINGS: Marked emphysema. Stable granulomatous calcifications. No acute appearing infiltrate. Heart size stable. Previous sternotomy. No acute osseous abnormality.      Stable emphysema. No acute finding  This report was finalized on 9/30/2020 7:21 PM by Dr. Serjio Sloan M.D.      Ct Head Without Contrast    Result Date: 9/30/2020  EXAMINATION: CT OF THE HEAD WITHOUT CONTRAST  HISTORY: 65-year-old female who felt a loud noise in the head and felt weird with shaking and dizziness.  TECHNIQUE: Contiguous axial images were obtained through the head without IV contrast.  COMPARISON: CT of the head without contrast, 06/14/2017.  FINDINGS: There is no evidence for intracranial hemorrhage. Normal gray-white matter differentiation is appreciated. There is no territorial edema or midline shift. The ventricles are normal in size and contour. The osseous structures of  the skull appear normal.      Negative CT of the head without contrast.  Radiation dose reduction techniques were utilized, including automated exposure control and exposure modulation based on body size.  This report was finalized on 9/30/2020 9:09 PM by Dr. Conrado Haynes M.D.        I ordered the above noted radiological studies. Reviewed by me and discussed with radiologist.  See dictation for official radiology interpretation.      PROCEDURES  Procedures      MEDICATIONS GIVEN IN ER    Medications   sodium chloride 0.9 % flush 10 mL (has no administration in time range)         PROGRESS, DATA ANALYSIS, CONSULTS, AND MEDICAL DECISION MAKING    All labs have been independently reviewed by me.  All radiology studies have been reviewed by me and discussed with radiologist dictating the report.   EKG's independently viewed and interpreted by me.  Discussion below represents my analysis of pertinent findings related to patient's condition, differential diagnosis, treatment plan and final disposition.      ED Course as of Sep 30 2139   Wed Sep 30, 2020   2036 MDM-etiology of patient's complaints is unclear at this time.  It is a fairly unusual story and does not sound suggestive of subarachnoid hemorrhage.  Does not sound like a stroke.  May be atypical migraine.  Exam is normal at this time without apparent focal neurologic deficit.  We will obtain EKG, labs and CT scan to look for neurological, cardiological or metabolic causes for her unusual symptoms.    [DB]   2101 Discussed CT of head with radiologist.  Found to be negative for acute pathology.    [DB]   2133 Laboratory work-up is fairly unremarkable with benign CBC, CMP and urinalysis.  Troponin and magnesium are also within normal limits.  CT scan as discussed above is also unremarkable.    [DB]   2133 Patient symptoms are fairly vague and she is currently neurologically intact.  I do not feel she needs admission for further work-up at this point but  reassured her the tests are normal and asked her to return to the ED for worsening symptoms or as needed.    [DB]   2137 Patient has remained asymptomatic while here in the ED, feels back to baseline.  Vitals have been reassuring.  I discussed patient testing including unremarkable head CT, EKG and labs.  At this point will let patient go home to follow-up with primary care provider or return to the ED for worsening symptoms.    [DB]      ED Course User Index  [DB] Fam Sosa MD       AS OF 21:39 EDT VITALS:    BP - 111/67  HR - 86  TEMP - 98.6 °F (37 °C)  O2 SATS - 100%      DIAGNOSIS  Final diagnoses:   Dizziness of unknown etiology         DISPOSITION  DISCHARGE    Patient discharged in stable condition.    Reviewed implications of results, diagnosis, meds, responsibility to follow up, warning signs and symptoms of possible worsening, potential complications and reasons to return to ER, including worsening symptoms or as needed.    Patient/Family voiced understanding of above instructions.    Discussed plan for discharge, as there is no emergent indication for admission. Patient referred to primary care provider for BP management due to today's BP. Pt/family is agreeable and understands need for follow up and repeat testing.  Pt is aware that discharge does not mean that nothing is wrong but it indicates no emergency is present that requires admission and they must continue care with follow-up as given below or physician of their choice.     FOLLOW-UP  Javan Martinez MD  [address] [place], [place] [address] [phone]    In 3 days  If Not Better         Medication List      Changed    polyethylene glycol packet  Commonly known as: MIRALAX  Take 17 g by mouth 2 (Two) Times a Day.  What changed:   · when to take this  · reasons to take this                   Fam Sosa MD  09/30/20 2138       Fam Sosa MD  09/30/20 2139

## 2020-10-01 NOTE — TELEPHONE ENCOUNTER
Patient calling and states she was feeling dizzy and lightheaded and like she was going to pass out. She states she did go to the hospital last night since it had happened a few times. They did a CAT Scan, EKG and checked her sugar. Everything came back normal, her sugar read 108, but she states she had eaten a caramel apple before going. She thinks it could be a medication causing this or vertigo. She would like advice on what to do.    Patient can be reached at [phone].

## 2020-10-05 RX ORDER — ALBUTEROL SULFATE 2.5 MG/3ML
SOLUTION RESPIRATORY (INHALATION)
Qty: 150 ML | Refills: 3 | Status: SHIPPED | OUTPATIENT
Start: 2020-10-05 | End: 2020-10-06 | Stop reason: SDUPTHER

## 2020-10-06 ENCOUNTER — OFFICE VISIT (OUTPATIENT)
Dept: FAMILY MEDICINE CLINIC | Facility: CLINIC | Age: 65
End: 2020-10-06

## 2020-10-06 VITALS — SYSTOLIC BLOOD PRESSURE: 130 MMHG | DIASTOLIC BLOOD PRESSURE: 60 MMHG | HEART RATE: 80 BPM

## 2020-10-06 DIAGNOSIS — I10 ESSENTIAL HYPERTENSION: Primary | ICD-10-CM

## 2020-10-06 DIAGNOSIS — R42 VERTIGO: ICD-10-CM

## 2020-10-06 DIAGNOSIS — Z79.891 LONG TERM CURRENT USE OF OPIATE ANALGESIC: ICD-10-CM

## 2020-10-06 DIAGNOSIS — E78.00 PURE HYPERCHOLESTEROLEMIA: ICD-10-CM

## 2020-10-06 PROCEDURE — 99443 PR PHYS/QHP TELEPHONE EVALUATION 21-30 MIN: CPT | Performed by: INTERNAL MEDICINE

## 2020-10-06 RX ORDER — ALBUTEROL SULFATE 2.5 MG/3ML
2.5 SOLUTION RESPIRATORY (INHALATION) EVERY 6 HOURS PRN
Qty: 150 ML | Refills: 3 | Status: SHIPPED | OUTPATIENT
Start: 2020-10-06 | End: 2021-01-11

## 2020-10-06 RX ORDER — MECLIZINE HYDROCHLORIDE 25 MG/1
25 TABLET ORAL 3 TIMES DAILY PRN
Qty: 30 TABLET | Refills: 3 | Status: SHIPPED | OUTPATIENT
Start: 2020-10-06

## 2020-10-06 RX ORDER — HYDROCODONE BITARTRATE AND ACETAMINOPHEN 5; 325 MG/1; MG/1
1 TABLET ORAL EVERY 8 HOURS PRN
Qty: 90 TABLET | Refills: 0 | Status: SHIPPED | OUTPATIENT
Start: 2020-10-06 | End: 2020-10-12 | Stop reason: SDUPTHER

## 2020-10-06 NOTE — PROGRESS NOTES
Subjective   Paz Browne is a 65 y.o. female.     Vitals:    10/06/20 0938   BP: 130/60   Pulse: 80      There is no height or weight on file to calculate BMI.     History of Present Illness   You have chosen to receive care through a telephone visit. Do you consent to use a telephone visit for your medical care today? Yes  Patient was seen for a 22-minute phone encounter.  Patient's blood pressure was running 130s over 70s.  Her pulse was in the 80s to 70s.  Patient's highest systolic reading was 140s.  Patient states that she is getting dizzy and requires her meclizine.  Patient states the meclizine will relieve her symptoms.  Patient's medicine was refilled.  Patient will follow-up in 1 month about how meantime she is in her meclizine.  Patient does have severe low back pain and requires pain medication.  Patient had it refilled today.  Patient's lipids are treated with diet exercise and a statin.  Patient will follow-up in labs when she feels it is appropriate.  Due to COVID.    Dictated utilizing Dragon dictation. If there are questions or for further clarification, please contact me.  The following portions of the patient's history were reviewed and updated as appropriate: allergies, current medications, past family history, past medical history, past social history, past surgical history and problem list.    Review of Systems   Constitutional: Negative for fatigue and fever.   HENT: Positive for congestion. Negative for trouble swallowing.    Eyes: Negative for discharge and visual disturbance.   Respiratory: Negative for choking and shortness of breath.    Cardiovascular: Negative for chest pain and palpitations.   Gastrointestinal: Negative for abdominal pain and blood in stool.   Endocrine: Negative.    Genitourinary: Negative for genital sores and hematuria.   Musculoskeletal: Negative for gait problem and joint swelling.   Skin: Negative for color change, pallor, rash and wound.    Allergic/Immunologic: Positive for environmental allergies. Negative for immunocompromised state.   Neurological: Positive for dizziness. Negative for facial asymmetry and speech difficulty.   Psychiatric/Behavioral: Negative for hallucinations and suicidal ideas.       Objective   Physical Exam  Pulmonary:      Effort: Pulmonary effort is normal.      Breath sounds: Normal breath sounds.   Neurological:      General: No focal deficit present.      Mental Status: Mental status is at baseline. She is disoriented.   Psychiatric:         Mood and Affect: Mood normal.         Behavior: Behavior normal.         Thought Content: Thought content normal.         Judgment: Judgment normal.         Assessment/Plan #1 continue to monitor blood pressure and pulse at home #2 patient will check blood pressure and pulse when dizzy.  #3 patient will follow-up in 1 month about how meantime she is using meclizine.  Problems Addressed this Visit     Cardiovascular and Mediastinum              Hypertension - Primary    Hyperlipidemia          Other              Vertigo            Other Visit Diagnoses     Long term current use of opiate analgesic         Relevant Medications    HYDROcodone-acetaminophen (NORCO) 5-325 MG per tablet      Diagnoses     Diagnosis Codes Comments    Essential hypertension    -  Primary ICD-10-CM: I10  ICD-9-CM: 401.9     Pure hypercholesterolemia     ICD-10-CM: E78.00  ICD-9-CM: 272.0     Vertigo     ICD-10-CM: R42  ICD-9-CM: 780.4     Long term current use of opiate analgesic      ICD-10-CM: Z79.891  ICD-9-CM: V58.69

## 2020-10-12 ENCOUNTER — TELEPHONE (OUTPATIENT)
Dept: FAMILY MEDICINE CLINIC | Facility: CLINIC | Age: 65
End: 2020-10-12

## 2020-10-12 DIAGNOSIS — Z79.891 LONG TERM CURRENT USE OF OPIATE ANALGESIC: ICD-10-CM

## 2020-10-12 RX ORDER — HYDROCODONE BITARTRATE AND ACETAMINOPHEN 5; 325 MG/1; MG/1
1 TABLET ORAL EVERY 8 HOURS PRN
Qty: 90 TABLET | Refills: 0 | Status: SHIPPED | OUTPATIENT
Start: 2020-10-12 | End: 2020-12-08 | Stop reason: SDUPTHER

## 2020-10-12 RX ORDER — HYDROCODONE BITARTRATE AND ACETAMINOPHEN 5; 325 MG/1; MG/1
1 TABLET ORAL EVERY 8 HOURS PRN
Qty: 90 TABLET | Refills: 0 | Status: CANCELLED | OUTPATIENT
Start: 2020-10-12

## 2020-10-12 NOTE — TELEPHONE ENCOUNTER
Patient called in and is requesting a refill of HYDROcodone-acetaminophen (NORCO) 5-325 MG per tablet  Patient has a 3 day supply left      Sent to [facility]/pharmacy #[id number] - [place], [place] - [address] [place] [address] AT [facility] - [phone]  - [phone]   [phone]        Patient call back [phone]

## 2020-10-21 RX ORDER — POTASSIUM CHLORIDE 20 MEQ/1
TABLET, EXTENDED RELEASE ORAL
Qty: 270 TABLET | Refills: 1 | Status: SHIPPED | OUTPATIENT
Start: 2020-10-21 | End: 2021-04-14

## 2020-10-28 RX ORDER — OMEPRAZOLE 40 MG/1
CAPSULE, DELAYED RELEASE ORAL
Qty: 90 CAPSULE | Refills: 1 | Status: SHIPPED | OUTPATIENT
Start: 2020-10-28 | End: 2021-04-27

## 2020-11-06 ENCOUNTER — TELEPHONE (OUTPATIENT)
Dept: FAMILY MEDICINE CLINIC | Facility: CLINIC | Age: 65
End: 2020-11-06

## 2020-11-06 ENCOUNTER — OFFICE VISIT (OUTPATIENT)
Dept: FAMILY MEDICINE CLINIC | Facility: CLINIC | Age: 65
End: 2020-11-06

## 2020-11-06 VITALS — HEART RATE: 84 BPM | SYSTOLIC BLOOD PRESSURE: 130 MMHG | DIASTOLIC BLOOD PRESSURE: 67 MMHG

## 2020-11-06 DIAGNOSIS — J44.1 COPD EXACERBATION (HCC): ICD-10-CM

## 2020-11-06 DIAGNOSIS — M54.40 CHRONIC LOW BACK PAIN WITH SCIATICA, SCIATICA LATERALITY UNSPECIFIED, UNSPECIFIED BACK PAIN LATERALITY: ICD-10-CM

## 2020-11-06 DIAGNOSIS — G89.29 CHRONIC LOW BACK PAIN WITH SCIATICA, SCIATICA LATERALITY UNSPECIFIED, UNSPECIFIED BACK PAIN LATERALITY: ICD-10-CM

## 2020-11-06 DIAGNOSIS — I10 ESSENTIAL HYPERTENSION: Primary | ICD-10-CM

## 2020-11-06 PROCEDURE — 99443 PR PHYS/QHP TELEPHONE EVALUATION 21-30 MIN: CPT | Performed by: INTERNAL MEDICINE

## 2020-11-06 RX ORDER — ZOLPIDEM TARTRATE 10 MG/1
10 TABLET ORAL NIGHTLY PRN
Qty: 30 TABLET | Refills: 4 | Status: SHIPPED | OUTPATIENT
Start: 2020-11-06 | End: 2020-12-23 | Stop reason: ALTCHOICE

## 2020-11-06 NOTE — PROGRESS NOTES
Subjective   Paz Browne is a 65 y.o. female.     Vitals:    11/06/20 1102   BP: 130/67   Pulse: 84      There is no height or weight on file to calculate BMI.     History of Present Illness   You have chosen to receive care through a telephone visit. Do you consent to use a telephone visit for your medical care today? Yes  Patient was seen for a 22-minute phone encounter.  Patient was seen for hypertension.  Blood pressures been running 130s over 80s.  With a pulse rate in the 80s.  Patient will continue monitor blood pressure at home.  Patient does have COPD and is treating with Advair/albuterol and Spiriva inhalers.  Patient is been stable in the past several months.  Patient does do light exercise.  Patient does have low back pain.  Is being treated with cyclobenzaprine, hydrocodone 5/325, gabapentin 600 mg 3 times daily.  Patient is also using low impact exercise.  Patient is stable and will follow-up in 4 months.    Dictated utilizing Dragon dictation. If there are questions or for further clarification, please contact me.  The following portions of the patient's history were reviewed and updated as appropriate: allergies, current medications, past family history, past medical history, past social history, past surgical history and problem list.    Review of Systems   Constitutional: Negative for fatigue and fever.   HENT: Positive for congestion. Negative for trouble swallowing.    Eyes: Negative for discharge and visual disturbance.   Respiratory: Negative for choking and shortness of breath.    Cardiovascular: Negative for chest pain and palpitations.   Gastrointestinal: Negative for abdominal pain and blood in stool.   Endocrine: Negative.    Genitourinary: Negative for genital sores and hematuria.   Musculoskeletal: Positive for back pain, gait problem and joint swelling.   Skin: Negative for color change, pallor, rash and wound.   Allergic/Immunologic: Positive for environmental allergies. Negative  for immunocompromised state.   Neurological: Negative for facial asymmetry and speech difficulty.   Psychiatric/Behavioral: Negative for hallucinations and suicidal ideas.       Objective   Physical Exam  Constitutional:       Appearance: She is well-developed.   Pulmonary:      Effort: Pulmonary effort is normal.      Breath sounds: Normal breath sounds.   Abdominal:      General: Bowel sounds are normal.      Palpations: Abdomen is soft.   Neurological:      General: No focal deficit present.      Mental Status: She is oriented to person, place, and time. Mental status is at baseline.   Psychiatric:         Mood and Affect: Mood normal.         Behavior: Behavior normal.         Thought Content: Thought content normal.         Judgment: Judgment normal.         Assessment/Plan #1 continue to monitor blood pressure #2 continue bronchodilators No. 3 low impact exercise #4 follow-up in 4 months  Problems Addressed this Visit     Cardiovascular and Mediastinum              Hypertension - Primary          Respiratory              COPD exacerbation (CMS/HCC)          Nervous and Auditory              Chronic low back pain            Diagnoses     Diagnosis Codes Comments    Essential hypertension    -  Primary ICD-10-CM: I10  ICD-9-CM: 401.9     Chronic low back pain with sciatica, sciatica laterality unspecified, unspecified back pain laterality     ICD-10-CM: M54.40, G89.29  ICD-9-CM: 724.2, 724.3, 338.29     COPD exacerbation (CMS/HCC)     ICD-10-CM: J44.1  ICD-9-CM: 491.21

## 2020-11-06 NOTE — TELEPHONE ENCOUNTER
PATIENTS  CALLED IN TO SEE IF WAS OKAY TO  PAPERWORK WHEN IT IS FINISHED.     CALLBACK NUMBER [phone]

## 2020-11-09 ENCOUNTER — TELEPHONE (OUTPATIENT)
Dept: FAMILY MEDICINE CLINIC | Facility: CLINIC | Age: 65
End: 2020-11-09

## 2020-11-09 NOTE — TELEPHONE ENCOUNTER
Insomnia G47.01,atrial flutter I48.92, restless leg G25.81 ,copd j44  Tried: rozarem,temazepam, melatanion, nquil,belsomra

## 2020-11-13 ENCOUNTER — TELEPHONE (OUTPATIENT)
Dept: FAMILY MEDICINE CLINIC | Facility: CLINIC | Age: 65
End: 2020-11-13

## 2020-11-13 NOTE — TELEPHONE ENCOUNTER
Patient states she does not want to take Ambien. She would rather stay on what she is currently taking which is Temazepam 30mg capsule or try something else. She is concerned about the side effects and not knowing what she is doing.    Patient callback [phone]    Pharmacy confirmed: [facility]/pharmacy #[id number] - [place], [place] - [address] - [phone]  - [phone]   [phone]

## 2020-11-17 ENCOUNTER — TELEPHONE (OUTPATIENT)
Dept: FAMILY MEDICINE CLINIC | Facility: CLINIC | Age: 65
End: 2020-11-17

## 2020-11-17 RX ORDER — ZALEPLON 10 MG/1
10 CAPSULE ORAL NIGHTLY
Qty: 30 CAPSULE | Refills: 3 | Status: SHIPPED | OUTPATIENT
Start: 2020-11-17 | End: 2021-03-08

## 2020-11-17 NOTE — TELEPHONE ENCOUNTER
PATIENT CALLED AGAIN AND STATES SHE HAS BEEN TAKING TEMAZEPAM 30 MG CAPSULE( NOT ON MED LIST) SHE STATES SHE HAS BEEN TAKING IT FOR A LONG TIME. SHE TAKES IT FOR SLEEP. SHE IS WANTING TO KNOW IF THERE IS ANYTHING ELSE TO HELP HER STAY ASLEEP. SHE DOES NOT WANT TO TAKE AMBIEN. SHE'S AFRAID OF IT. CAN THE DOSAGE OF TEMAZEPAM BE INCREASED.   SHE HAS RESTLESS LEG SYNDROME.     [id number] - [place], [place] - [address] AT [facility] - [phone]  - [phone]   [phone]    CALL BACK NUMBER [phone]    SHE ALSO WHAT TO KNOW IF SHE SHOULD TAKE THE COVID VACCINE WHEN IT COMES AVAILABLE, SHE HAS COPE , CONGESTIVE HEART FAILURE AND EMPHYSEMA.

## 2020-12-01 ENCOUNTER — TELEPHONE (OUTPATIENT)
Dept: FAMILY MEDICINE CLINIC | Facility: CLINIC | Age: 65
End: 2020-12-01

## 2020-12-01 RX ORDER — ALBUTEROL SULFATE 90 UG/1
2 AEROSOL, METERED RESPIRATORY (INHALATION) EVERY 4 HOURS PRN
Qty: 18 G | Refills: 3 | Status: SHIPPED | OUTPATIENT
Start: 2020-12-01

## 2020-12-01 NOTE — TELEPHONE ENCOUNTER
albuterol (PROVENTIL HFA;VENTOLIN HFA) 108 (90 Base) MCG/ACT inhaler    tiotropium (Spiriva HandiHaler) 18 MCG per inhalation capsule     fluticasone-salmeterol (ADVAIR DISKUS) 250-50 MCG/DOSE DISKUS    ALL IN 3 MONTH SUPPLY.    PATIENT IS REQUESTING A WRITTEN PRESCRIPTION FOR THESE MEDICATIONS.  CAN PICK THEM UP.    PATIENT CALL BACK: [phone]

## 2020-12-08 DIAGNOSIS — Z79.891 LONG TERM CURRENT USE OF OPIATE ANALGESIC: ICD-10-CM

## 2020-12-08 RX ORDER — HYDROCODONE BITARTRATE AND ACETAMINOPHEN 5; 325 MG/1; MG/1
1 TABLET ORAL EVERY 8 HOURS PRN
Qty: 90 TABLET | Refills: 0 | Status: SHIPPED | OUTPATIENT
Start: 2020-12-08 | End: 2021-01-05 | Stop reason: SDUPTHER

## 2020-12-08 NOTE — TELEPHONE ENCOUNTER
Caller: Paz Browne    Relationship: Self    Best call back number:     Medication needed:   Requested Prescriptions     Pending Prescriptions Disp Refills   • HYDROcodone-acetaminophen (NORCO) 5-325 MG per tablet 90 tablet 0     Sig: Take 1 tablet by mouth Every 8 (Eight) Hours As Needed for Moderate Pain . Chronic pain medicine for low back pain       When do you need the refill by:     What details did the patient provide when requesting the medication:     Does the patient have less than a 3 day supply:  [x] Yes  [] No    What is the patient's preferred pharmacy:    [address]  [phone]

## 2020-12-14 RX ORDER — SERTRALINE HYDROCHLORIDE 100 MG/1
TABLET, FILM COATED ORAL
Qty: 90 TABLET | Refills: 1 | Status: SHIPPED | OUTPATIENT
Start: 2020-12-14 | End: 2021-11-09 | Stop reason: SDUPTHER

## 2020-12-14 RX ORDER — SERTRALINE HYDROCHLORIDE 100 MG/1
100 TABLET, FILM COATED ORAL DAILY
Qty: 90 TABLET | Refills: 1 | Status: SHIPPED | OUTPATIENT
Start: 2020-12-14 | End: 2020-12-23 | Stop reason: SDUPTHER

## 2020-12-14 NOTE — TELEPHONE ENCOUNTER
Caller: Paz Browne    Relationship: Self    Best call back number: [phone]    Medication needed:   Requested Prescriptions     Pending Prescriptions Disp Refills   • sertraline (ZOLOFT) 100 MG tablet 90 tablet 1     Sig: Take 1 tablet by mouth Daily.       When do you need the refill by: ASAP    What details did the patient provide when requesting the medication: PT IS ALMOST OUT OF MEDICATION     Does the patient have less than a 3 day supply:  [x] Yes  [] No    What is the patient's preferred pharmacy: [facility]/PHARMACY #[id number] [address] - [phone] [facility] [phone] FX

## 2020-12-17 RX ORDER — CYCLOBENZAPRINE HCL 10 MG
TABLET ORAL
Qty: 90 TABLET | Refills: 3 | Status: SHIPPED | OUTPATIENT
Start: 2020-12-17 | End: 2020-12-21

## 2020-12-17 RX ORDER — CARVEDILOL 6.25 MG/1
TABLET ORAL
Qty: 180 TABLET | Refills: 2 | Status: SHIPPED | OUTPATIENT
Start: 2020-12-17 | End: 2021-07-29

## 2020-12-21 RX ORDER — CYCLOBENZAPRINE HCL 10 MG
TABLET ORAL
Qty: 90 TABLET | Refills: 3 | Status: SHIPPED | OUTPATIENT
Start: 2020-12-21 | End: 2020-12-28

## 2020-12-22 PROBLEM — M62.838 MUSCLE SPASMS OF BOTH LOWER EXTREMITIES: Status: ACTIVE | Noted: 2020-12-22

## 2020-12-23 ENCOUNTER — OFFICE VISIT (OUTPATIENT)
Dept: FAMILY MEDICINE CLINIC | Facility: CLINIC | Age: 65
End: 2020-12-23

## 2020-12-23 VITALS — SYSTOLIC BLOOD PRESSURE: 120 MMHG | HEART RATE: 80 BPM | DIASTOLIC BLOOD PRESSURE: 80 MMHG

## 2020-12-23 DIAGNOSIS — I10 ESSENTIAL HYPERTENSION: Primary | ICD-10-CM

## 2020-12-23 DIAGNOSIS — J44.1 COPD EXACERBATION (HCC): ICD-10-CM

## 2020-12-23 DIAGNOSIS — E78.00 PURE HYPERCHOLESTEROLEMIA: ICD-10-CM

## 2020-12-23 PROCEDURE — 99443 PR PHYS/QHP TELEPHONE EVALUATION 21-30 MIN: CPT | Performed by: INTERNAL MEDICINE

## 2020-12-23 NOTE — PROGRESS NOTES
Subjective   Paz Browne is a 65 y.o. female.     Vitals:    12/23/20 1023   BP: 120/80   Pulse: 80      There is no height or weight on file to calculate BMI.     History of Present Illness   You have chosen to receive care through a telephone visit. Do you consent to use a telephone visit for your medical care today? Yes  Patient had a 22-minute phone encounter. His blood pressures been running 120s over 80s. Patient will continue her present monitoring of blood pressure and carvedilol 6.25 mg twice daily, lisinopril 40 mg daily. Patient's lipids being treated with diet exercise. Patient COPD is well controlled with her bronchodilators. Patient will continue to monitor blood pressure and follow-up in 6 months. Patient was informed to have labs done when she considers it safe to get out of the house.    Dictated utilizing Dragon dictation. If there are questions or for further clarification, please contact me.  The following portions of the patient's history were reviewed and updated as appropriate: allergies, current medications, past family history, past medical history, past social history, past surgical history and problem list.    Review of Systems   Constitutional: Negative for fatigue and fever.   HENT: Positive for congestion. Negative for trouble swallowing.    Eyes: Negative for discharge and visual disturbance.   Respiratory: Negative for choking and shortness of breath.    Cardiovascular: Negative for chest pain and palpitations.   Gastrointestinal: Negative for abdominal pain and blood in stool.   Endocrine: Negative.    Genitourinary: Negative for genital sores and hematuria.   Musculoskeletal: Negative for gait problem and joint swelling.   Skin: Negative for color change, pallor, rash and wound.   Allergic/Immunologic: Positive for environmental allergies. Negative for immunocompromised state.   Neurological: Negative for facial asymmetry and speech difficulty.   Psychiatric/Behavioral:  Negative for hallucinations and suicidal ideas.       Objective   Physical Exam  Pulmonary:      Effort: Pulmonary effort is normal.      Breath sounds: Normal breath sounds.   Neurological:      General: No focal deficit present.      Mental Status: She is oriented to person, place, and time. Mental status is at baseline.   Psychiatric:         Mood and Affect: Mood normal.         Behavior: Behavior normal.         Thought Content: Thought content normal.         Judgment: Judgment normal.         Assessment/Plan One continue monitor blood pressure #2 continue bronchodilator treatment #3 labs  Problems Addressed this Visit     Cardiovascular and Mediastinum              Hypertension - Primary    Hyperlipidemia          Respiratory              COPD exacerbation (CMS/[facility])            Diagnoses     Diagnosis Codes Comments    Essential hypertension    -  Primary ICD-10-CM: I10  ICD-9-CM: 401.9     Pure hypercholesterolemia     ICD-10-CM: E78.00  ICD-9-CM: 272.0     COPD exacerbation (CMS/HCC)     ICD-10-CM: J44.1  ICD-9-CM: 491.21

## 2020-12-28 RX ORDER — CYCLOBENZAPRINE HCL 10 MG
TABLET ORAL
Qty: 90 TABLET | Refills: 3 | Status: SHIPPED | OUTPATIENT
Start: 2020-12-28 | End: 2022-02-01 | Stop reason: SDUPTHER

## 2021-01-05 DIAGNOSIS — Z79.891 LONG TERM CURRENT USE OF OPIATE ANALGESIC: ICD-10-CM

## 2021-01-05 RX ORDER — HYDROCODONE BITARTRATE AND ACETAMINOPHEN 5; 325 MG/1; MG/1
1 TABLET ORAL EVERY 8 HOURS PRN
Qty: 30 TABLET | Refills: 0 | Status: SHIPPED | OUTPATIENT
Start: 2021-01-05 | End: 2021-01-13 | Stop reason: SDUPTHER

## 2021-01-05 RX ORDER — HYDROCODONE BITARTRATE AND ACETAMINOPHEN 5; 325 MG/1; MG/1
1 TABLET ORAL EVERY 8 HOURS PRN
Qty: 90 TABLET | Refills: 0 | Status: CANCELLED | OUTPATIENT
Start: 2021-01-05

## 2021-01-05 NOTE — TELEPHONE ENCOUNTER
Caller: Paz Browne    Relationship: Self    Best call back number:     Medication needed:   Requested Prescriptions     Pending Prescriptions Disp Refills   • HYDROcodone-acetaminophen (NORCO) 5-325 MG per tablet 90 tablet 0     Sig: Take 1 tablet by mouth Every 8 (Eight) Hours As Needed for Moderate Pain . Chronic pain medicine for low back pain       When do you need the refill by:     What details did the patient provide when requesting the medication: PT STATES THAT SHE WANTS TO GET THE COVID VACCINE AND WANTS TO KNOW WHEN IT WILL BE AVAILABLE.     Does the patient have less than a 3 day supply:  [x] Yes  [] No    What is the patient's preferred pharmacy:    [address]  [phone]

## 2021-01-08 ENCOUNTER — TELEPHONE (OUTPATIENT)
Dept: CARDIOLOGY | Facility: CLINIC | Age: 66
End: 2021-01-08

## 2021-01-11 RX ORDER — ALBUTEROL SULFATE 2.5 MG/3ML
SOLUTION RESPIRATORY (INHALATION)
Qty: 150 ML | Refills: 3 | Status: SHIPPED | OUTPATIENT
Start: 2021-01-11 | End: 2021-04-19

## 2021-01-13 ENCOUNTER — TELEPHONE (OUTPATIENT)
Dept: FAMILY MEDICINE CLINIC | Facility: CLINIC | Age: 66
End: 2021-01-13

## 2021-01-13 DIAGNOSIS — Z79.891 LONG TERM CURRENT USE OF OPIATE ANALGESIC: ICD-10-CM

## 2021-01-13 RX ORDER — HYDROCODONE BITARTRATE AND ACETAMINOPHEN 5; 325 MG/1; MG/1
1 TABLET ORAL EVERY 8 HOURS PRN
Qty: 90 TABLET | Refills: 0 | Status: SHIPPED | OUTPATIENT
Start: 2021-01-13 | End: 2021-02-17 | Stop reason: SDUPTHER

## 2021-01-13 NOTE — TELEPHONE ENCOUNTER
PATIENT CALLED BACK WANTING TO KNOW WHY SHE ONLY RECEIVED 30 TABLETS OF HER PAIN MEDICINE.     PATIENT STATED SHE USUALLY GETS 90 TABS.     PLEASE ADVISE [phone]    PATIENT LEFT AUTHORIZATION TO LEAVE MESSAGE ON HER VOICEMAIL

## 2021-01-13 NOTE — TELEPHONE ENCOUNTER
Caller: Paz Browne    Relationship to patient: Self    Best call back number: [phone]  - patient gives verbal okay to leave message on voicemail       Patient is needing: Patient called in and stated she has requested a refill of HYDROcodone-acetaminophen (NORCO) 5-325 MG per tablet and got a 30 day supply and usually gets 90 day supply and wants to know why . Please call her .

## 2021-01-14 ENCOUNTER — TELEPHONE (OUTPATIENT)
Dept: CARDIOLOGY | Facility: CLINIC | Age: 66
End: 2021-01-14

## 2021-01-14 NOTE — TELEPHONE ENCOUNTER
Patient return call regarding Dr. Gan retiring, she stated she is not wishing to reschedule any follow ups at this time and will call when she is ready to come in and be seen.     Blanca

## 2021-01-21 ENCOUNTER — TELEPHONE (OUTPATIENT)
Dept: FAMILY MEDICINE CLINIC | Facility: CLINIC | Age: 66
End: 2021-01-21

## 2021-01-21 RX ORDER — BENZONATATE 100 MG/1
100 CAPSULE ORAL 2 TIMES DAILY PRN
Qty: 30 CAPSULE | Refills: 3 | Status: SHIPPED | OUTPATIENT
Start: 2021-01-21 | End: 2021-06-04

## 2021-01-21 NOTE — TELEPHONE ENCOUNTER
"    Caller: Paz Browne    Relationship: Self    Best call back number: [phone]    What medication are you requesting: \"SOMETHING TO HELP WITCH COPD.\"    What are your current symptoms: COUGH AND CONGESTION      How long have you been experiencing symptoms: 3 DAYS     Have you had these symptoms before:    [x] Yes  [] No    Have you been treated for these symptoms before:   [x] Yes  [] No    If a prescription is needed, what is your preferred pharmacy and phone number: [facility]/PHARMACY #[id number] - [address] - [phone]  - [phone]      Additional notes: PATIENT IS CALLING IN REGARDS TO SEEING IF DR LAINEZ COULD CALL HER IN A MEDICATION TO HELP WITH HER COUGH AND CONGESTION THAT IS BEING CAUSED BY HER COPD. SHE STATED THAT DR LAINEZ HAS SEEN HER FOR THIS ISSUE BEFORE. PLEASE ADVISE           "

## 2021-01-22 ENCOUNTER — TELEPHONE (OUTPATIENT)
Dept: FAMILY MEDICINE CLINIC | Facility: CLINIC | Age: 66
End: 2021-01-22

## 2021-01-22 RX ORDER — DOXYCYCLINE HYCLATE 100 MG
100 TABLET ORAL 2 TIMES DAILY
Qty: 20 TABLET | Refills: 0 | Status: ON HOLD | OUTPATIENT
Start: 2021-01-22 | End: 2021-05-10

## 2021-01-22 NOTE — TELEPHONE ENCOUNTER
Caller: Paz Browne    Relationship: Self    Best call back number: [phone]    What medication are you requesting: ANTIBIOTIC     PATIENT HAS CHEST CONGESTION, FLUID     If a prescription is needed, what is your preferred pharmacy and phone number: [id number] - [place], [place] - [address] [facility] - [phone]  - [phone]      Additional notes:    PATIENT STATES THAT DR LAINEZ CALLED HER IN [place] COUGH MEDICATION WHICH HAS HELPED, BUT SHE IS REQUESTING AN ANTIBIOTIC SO THAT THIS DOES NOT TURN IN TO PNEUMONIA.

## 2021-02-02 ENCOUNTER — TELEPHONE (OUTPATIENT)
Dept: FAMILY MEDICINE CLINIC | Facility: CLINIC | Age: 66
End: 2021-02-02

## 2021-02-02 RX ORDER — METHYLPREDNISOLONE 4 MG/1
TABLET ORAL
Qty: 21 EACH | Refills: 0 | Status: SHIPPED | OUTPATIENT
Start: 2021-02-02 | End: 2021-04-09

## 2021-02-02 NOTE — TELEPHONE ENCOUNTER
PATIENT IS CALLING TO SEE IF PCP WILL CALL IN A STEROID/SHE HAS COUGH AND WHEEZING AND FINISHED ANTIBIOTICS TODAY AND IS STILL HAVING THICK MUCUS AND SHE IS REQUESTING A STEROID BE CALLED INTO PHARMACY    CALL BACK NUMBER [phone]    PREFERRED PHARMACY [facility]/pharmacy #[id number] - [place], [place] - [address] [place] [address] AT [facility] - [phone]  - [phone] FX

## 2021-02-17 DIAGNOSIS — Z79.891 LONG TERM CURRENT USE OF OPIATE ANALGESIC: ICD-10-CM

## 2021-02-17 RX ORDER — HYDROCODONE BITARTRATE AND ACETAMINOPHEN 5; 325 MG/1; MG/1
1 TABLET ORAL EVERY 8 HOURS PRN
Qty: 90 TABLET | Refills: 0 | Status: SHIPPED | OUTPATIENT
Start: 2021-02-17 | End: 2021-03-16 | Stop reason: SDUPTHER

## 2021-02-17 NOTE — TELEPHONE ENCOUNTER
Caller: Paz Browne    Relationship: Self    Best call back number:[phone]     Medication needed:   Requested Prescriptions     Pending Prescriptions Disp Refills   • HYDROcodone-acetaminophen (NORCO) 5-325 MG per tablet 90 tablet 0     Sig: Take 1 tablet by mouth Every 8 (Eight) Hours As Needed for Moderate Pain . Chronic pain medicine for low back pain       When do you need the refill by: ASAP       Does the patient have less than a 3 day supply:  [x] Yes  [] No    What is the patient's preferred pharmacy: [facility]/PHARMACY #[id number] - [place], [place] - [address] [place] [address] - [phone]  - [phone] FX

## 2021-03-01 DIAGNOSIS — E78.00 PURE HYPERCHOLESTEROLEMIA: ICD-10-CM

## 2021-03-01 DIAGNOSIS — J44.1 COPD EXACERBATION (HCC): ICD-10-CM

## 2021-03-01 DIAGNOSIS — I10 ESSENTIAL HYPERTENSION: Primary | ICD-10-CM

## 2021-03-01 DIAGNOSIS — E55.9 VITAMIN D DEFICIENCY, UNSPECIFIED: ICD-10-CM

## 2021-03-02 ENCOUNTER — LAB (OUTPATIENT)
Dept: FAMILY MEDICINE CLINIC | Facility: CLINIC | Age: 66
End: 2021-03-02

## 2021-03-02 DIAGNOSIS — E55.9 VITAMIN D DEFICIENCY, UNSPECIFIED: ICD-10-CM

## 2021-03-02 DIAGNOSIS — J44.1 COPD EXACERBATION (HCC): ICD-10-CM

## 2021-03-02 DIAGNOSIS — I10 ESSENTIAL HYPERTENSION: ICD-10-CM

## 2021-03-02 DIAGNOSIS — E78.00 PURE HYPERCHOLESTEROLEMIA: ICD-10-CM

## 2021-03-02 LAB
25(OH)D3 SERPL-MCNC: 46.3 NG/ML
ALBUMIN SERPL-MCNC: 4.6 G/DL (ref 3.5–5.2)
ALBUMIN/GLOB SERPL: 2.4 G/DL
ALP SERPL-CCNC: 68 U/L (ref 39–117)
ALT SERPL W P-5'-P-CCNC: 13 U/L (ref 1–33)
ANION GAP SERPL CALCULATED.3IONS-SCNC: 7.4 MMOL/L (ref 5–15)
AST SERPL-CCNC: 17 U/L (ref 1–32)
BILIRUB SERPL-MCNC: 0.4 MG/DL (ref 0–1.2)
BUN SERPL-MCNC: 19 MG/DL (ref 8–23)
BUN/CREAT SERPL: 14.3 (ref 7–25)
CALCIUM SPEC-SCNC: 9.9 MG/DL (ref 8.6–10.5)
CHLORIDE SERPL-SCNC: 101 MMOL/L (ref 98–107)
CHOLEST SERPL-MCNC: 200 MG/DL (ref 0–200)
CO2 SERPL-SCNC: 30.6 MMOL/L (ref 22–29)
CREAT SERPL-MCNC: 1.33 MG/DL (ref 0.57–1)
DEPRECATED RDW RBC AUTO: 41.5 FL (ref 37–54)
ERYTHROCYTE [DISTWIDTH] IN BLOOD BY AUTOMATED COUNT: 12 % (ref 12.3–15.4)
GFR SERPL CREATININE-BSD FRML MDRD: 40 ML/MIN/1.73
GLOBULIN UR ELPH-MCNC: 1.9 GM/DL
GLUCOSE SERPL-MCNC: 101 MG/DL (ref 65–99)
HCT VFR BLD AUTO: 33.6 % (ref 34–46.6)
HDLC SERPL-MCNC: 63 MG/DL (ref 40–60)
HGB BLD-MCNC: 10.9 G/DL (ref 12–15.9)
LDLC SERPL CALC-MCNC: 107 MG/DL (ref 0–100)
LDLC/HDLC SERPL: 1.62 {RATIO}
MCH RBC QN AUTO: 31 PG (ref 26.6–33)
MCHC RBC AUTO-ENTMCNC: 32.4 G/DL (ref 31.5–35.7)
MCV RBC AUTO: 95.5 FL (ref 79–97)
PLATELET # BLD AUTO: 203 10*3/MM3 (ref 140–450)
PMV BLD AUTO: 11.1 FL (ref 6–12)
POTASSIUM SERPL-SCNC: 5.3 MMOL/L (ref 3.5–5.2)
PROT SERPL-MCNC: 6.5 G/DL (ref 6–8.5)
RBC # BLD AUTO: 3.52 10*6/MM3 (ref 3.77–5.28)
SODIUM SERPL-SCNC: 139 MMOL/L (ref 136–145)
TRIGL SERPL-MCNC: 174 MG/DL (ref 0–150)
VLDLC SERPL-MCNC: 30 MG/DL (ref 5–40)
WBC # BLD AUTO: 7.44 10*3/MM3 (ref 3.4–10.8)

## 2021-03-02 PROCEDURE — 36415 COLL VENOUS BLD VENIPUNCTURE: CPT | Performed by: INTERNAL MEDICINE

## 2021-03-02 PROCEDURE — 82306 VITAMIN D 25 HYDROXY: CPT | Performed by: INTERNAL MEDICINE

## 2021-03-02 PROCEDURE — 80061 LIPID PANEL: CPT | Performed by: INTERNAL MEDICINE

## 2021-03-02 PROCEDURE — 80053 COMPREHEN METABOLIC PANEL: CPT | Performed by: INTERNAL MEDICINE

## 2021-03-02 PROCEDURE — 85027 COMPLETE CBC AUTOMATED: CPT | Performed by: INTERNAL MEDICINE

## 2021-03-03 ENCOUNTER — TELEPHONE (OUTPATIENT)
Dept: FAMILY MEDICINE CLINIC | Facility: CLINIC | Age: 66
End: 2021-03-03

## 2021-03-03 DIAGNOSIS — D64.9 ANEMIA, UNSPECIFIED TYPE: Primary | ICD-10-CM

## 2021-03-04 ENCOUNTER — IMMUNIZATION (OUTPATIENT)
Dept: VACCINE CLINIC | Facility: HOSPITAL | Age: 66
End: 2021-03-04

## 2021-03-04 PROCEDURE — 0001A: CPT | Performed by: INTERNAL MEDICINE

## 2021-03-04 PROCEDURE — 91300 HC SARSCOV02 VAC 30MCG/0.3ML IM: CPT | Performed by: INTERNAL MEDICINE

## 2021-03-08 ENCOUNTER — LAB (OUTPATIENT)
Dept: FAMILY MEDICINE CLINIC | Facility: CLINIC | Age: 66
End: 2021-03-08

## 2021-03-08 DIAGNOSIS — D64.9 ANEMIA, UNSPECIFIED TYPE: ICD-10-CM

## 2021-03-08 LAB
FOLATE SERPL-MCNC: >20 NG/ML (ref 4.78–24.2)
HCT VFR BLD AUTO: 32.9 % (ref 34–46.6)
HGB BLD-MCNC: 10.7 G/DL (ref 12–15.9)
IRON 24H UR-MRATE: 57 MCG/DL (ref 37–145)
IRON SATN MFR SERPL: 16 % (ref 20–50)
RETICS # AUTO: 0.06 10*6/MM3 (ref 0.02–0.13)
RETICS/RBC NFR AUTO: 1.58 % (ref 0.7–1.9)
TIBC SERPL-MCNC: 346 MCG/DL (ref 298–536)
TRANSFERRIN SERPL-MCNC: 232 MG/DL (ref 200–360)
VIT B12 BLD-MCNC: 1018 PG/ML (ref 211–946)

## 2021-03-08 PROCEDURE — 85045 AUTOMATED RETICULOCYTE COUNT: CPT | Performed by: INTERNAL MEDICINE

## 2021-03-08 PROCEDURE — 85014 HEMATOCRIT: CPT | Performed by: INTERNAL MEDICINE

## 2021-03-08 PROCEDURE — 83540 ASSAY OF IRON: CPT | Performed by: INTERNAL MEDICINE

## 2021-03-08 PROCEDURE — 82746 ASSAY OF FOLIC ACID SERUM: CPT | Performed by: INTERNAL MEDICINE

## 2021-03-08 PROCEDURE — 84466 ASSAY OF TRANSFERRIN: CPT | Performed by: INTERNAL MEDICINE

## 2021-03-08 PROCEDURE — 36415 COLL VENOUS BLD VENIPUNCTURE: CPT | Performed by: INTERNAL MEDICINE

## 2021-03-08 PROCEDURE — 82607 VITAMIN B-12: CPT | Performed by: INTERNAL MEDICINE

## 2021-03-08 PROCEDURE — 85018 HEMOGLOBIN: CPT | Performed by: INTERNAL MEDICINE

## 2021-03-08 RX ORDER — ROPINIROLE 2 MG/1
TABLET, FILM COATED ORAL
Qty: 90 TABLET | Refills: 2 | Status: SHIPPED | OUTPATIENT
Start: 2021-03-08 | End: 2021-11-25

## 2021-03-08 RX ORDER — ZALEPLON 10 MG/1
CAPSULE ORAL
Qty: 30 CAPSULE | Refills: 3 | Status: SHIPPED | OUTPATIENT
Start: 2021-03-08 | End: 2021-06-14

## 2021-03-08 RX ORDER — LISINOPRIL 40 MG/1
TABLET ORAL
Qty: 90 TABLET | Refills: 1 | Status: SHIPPED | OUTPATIENT
Start: 2021-03-08 | End: 2021-05-12 | Stop reason: HOSPADM

## 2021-03-09 ENCOUNTER — LAB (OUTPATIENT)
Dept: FAMILY MEDICINE CLINIC | Facility: CLINIC | Age: 66
End: 2021-03-09

## 2021-03-09 ENCOUNTER — OFFICE VISIT (OUTPATIENT)
Dept: FAMILY MEDICINE CLINIC | Facility: CLINIC | Age: 66
End: 2021-03-09

## 2021-03-09 DIAGNOSIS — R42 VERTIGO: ICD-10-CM

## 2021-03-09 DIAGNOSIS — I10 ESSENTIAL HYPERTENSION: ICD-10-CM

## 2021-03-09 DIAGNOSIS — D64.9 ANEMIA, UNSPECIFIED TYPE: Primary | ICD-10-CM

## 2021-03-09 DIAGNOSIS — E87.6 HYPOKALEMIA: ICD-10-CM

## 2021-03-09 LAB
ANION GAP SERPL CALCULATED.3IONS-SCNC: 12.3 MMOL/L (ref 5–15)
BUN SERPL-MCNC: 20 MG/DL (ref 8–23)
BUN/CREAT SERPL: 16.3 (ref 7–25)
CALCIUM SPEC-SCNC: 10.2 MG/DL (ref 8.6–10.5)
CHLORIDE SERPL-SCNC: 102 MMOL/L (ref 98–107)
CO2 SERPL-SCNC: 27.7 MMOL/L (ref 22–29)
CREAT SERPL-MCNC: 1.23 MG/DL (ref 0.57–1)
FERRITIN SERPL-MCNC: 794 NG/ML (ref 13–150)
GFR SERPL CREATININE-BSD FRML MDRD: 44 ML/MIN/1.73
GLUCOSE SERPL-MCNC: 79 MG/DL (ref 65–99)
HEMOCCULT STL QL IA: POSITIVE
MAGNESIUM SERPL-MCNC: 2.1 MG/DL (ref 1.6–2.4)
POTASSIUM SERPL-SCNC: 4.7 MMOL/L (ref 3.5–5.2)
SODIUM SERPL-SCNC: 142 MMOL/L (ref 136–145)

## 2021-03-09 PROCEDURE — 82274 ASSAY TEST FOR BLOOD FECAL: CPT | Performed by: INTERNAL MEDICINE

## 2021-03-09 PROCEDURE — 99443 PR PHYS/QHP TELEPHONE EVALUATION 21-30 MIN: CPT | Performed by: INTERNAL MEDICINE

## 2021-03-09 PROCEDURE — 80048 BASIC METABOLIC PNL TOTAL CA: CPT | Performed by: INTERNAL MEDICINE

## 2021-03-09 PROCEDURE — 82728 ASSAY OF FERRITIN: CPT | Performed by: INTERNAL MEDICINE

## 2021-03-09 PROCEDURE — 83735 ASSAY OF MAGNESIUM: CPT | Performed by: INTERNAL MEDICINE

## 2021-03-09 NOTE — PROGRESS NOTES
Subjective   Paz Browne is a 65 y.o. female.     There were no vitals filed for this visit.   There is no height or weight on file to calculate BMI.     History of Present Illness   You have chosen to receive care through a telephone visit. Do you consent to use a telephone visit for your medical care today? Yes  Patient was seen for a 24-minute phone encounter.  Patient was seen for anemia.  Patient's hemoglobin is 10.7.  Hemoglobin is been dropping for the last 6 months.  Patient's stool is positive for occult blood.  Patient was set up for gastroenterology for EGD and colonoscopy.  Patient does have a history of vertigo and is using meclizine 25 mg p.o. 3 times daily as needed.  Patient's been stable over the past several months.  Patient does have hypokalemia and labs are being ordered.  Patient's blood pressures been running 120s over 80s.  Patient will continue to monitor blood pressure at home.      The following portions of the patient's history were reviewed and updated as appropriate: allergies, current medications, past family history, past medical history, past social history, past surgical history and problem list.    Review of Systems   Constitutional: Negative for fatigue and fever.   HENT: Positive for congestion. Negative for trouble swallowing.    Eyes: Negative for discharge and visual disturbance.   Respiratory: Negative for choking and shortness of breath.    Cardiovascular: Negative for chest pain and palpitations.   Gastrointestinal: Negative for abdominal pain and blood in stool.   Endocrine: Negative.    Genitourinary: Negative for genital sores and hematuria.   Musculoskeletal: Negative for gait problem and joint swelling.   Skin: Negative for color change, pallor, rash and wound.   Allergic/Immunologic: Positive for environmental allergies. Negative for immunocompromised state.   Neurological: Negative for facial asymmetry and speech difficulty.   Psychiatric/Behavioral: Negative for  hallucinations and suicidal ideas.       Objective   Physical Exam  Constitutional:       Appearance: She is well-developed.   Pulmonary:      Effort: Pulmonary effort is normal.      Breath sounds: Normal breath sounds.   Neurological:      General: No focal deficit present.      Mental Status: She is oriented to person, place, and time. Mental status is at baseline.   Psychiatric:         Mood and Affect: Mood normal.         Behavior: Behavior normal.         Thought Content: Thought content normal.         Judgment: Judgment normal.         Assessment/Plan #1 refer to gastroenterology for EGD colonoscopy #2 continue to monitor blood pressure at home #3 labs  Problems Addressed this Visit     Cardiac and Vasculature            Hypertension          ENT            Vertigo          Hematology and Neoplasia            Anemia - Primary    Relevant Orders    Ambulatory Referral to Gastroenterology    Ferritin (Completed)            Other Visit Diagnoses     Hypokalemia        Relevant Orders    Basic Metabolic Panel (Completed)    Magnesium (Completed)      Diagnoses     Diagnosis Codes Comments    Anemia, unspecified type    -  Primary ICD-10-CM: D64.9  ICD-9-CM: 285.9     Vertigo     ICD-10-CM: R42  ICD-9-CM: 780.4     Hypokalemia     ICD-10-CM: E87.6  ICD-9-CM: 276.8     Essential hypertension     ICD-10-CM: I10  ICD-9-CM: 401.9

## 2021-03-10 ENCOUNTER — TELEPHONE (OUTPATIENT)
Dept: FAMILY MEDICINE CLINIC | Facility: CLINIC | Age: 66
End: 2021-03-10

## 2021-03-10 DIAGNOSIS — R79.89 ELEVATED FERRITIN: Primary | ICD-10-CM

## 2021-03-15 RX ORDER — FUROSEMIDE 40 MG/1
TABLET ORAL
Qty: 180 TABLET | Refills: 0 | Status: SHIPPED | OUTPATIENT
Start: 2021-03-15 | End: 2021-05-12 | Stop reason: HOSPADM

## 2021-03-16 ENCOUNTER — TELEPHONE (OUTPATIENT)
Dept: FAMILY MEDICINE CLINIC | Facility: CLINIC | Age: 66
End: 2021-03-16

## 2021-03-16 DIAGNOSIS — Z79.891 LONG TERM CURRENT USE OF OPIATE ANALGESIC: ICD-10-CM

## 2021-03-16 RX ORDER — HYDROCODONE BITARTRATE AND ACETAMINOPHEN 5; 325 MG/1; MG/1
1 TABLET ORAL EVERY 8 HOURS PRN
Qty: 90 TABLET | Refills: 0 | Status: SHIPPED | OUTPATIENT
Start: 2021-03-16 | End: 2021-04-16 | Stop reason: SDUPTHER

## 2021-03-16 RX ORDER — HYDROCODONE BITARTRATE AND ACETAMINOPHEN 5; 325 MG/1; MG/1
1 TABLET ORAL EVERY 8 HOURS PRN
Qty: 90 TABLET | Refills: 0 | Status: CANCELLED | OUTPATIENT
Start: 2021-03-16

## 2021-03-16 NOTE — TELEPHONE ENCOUNTER
Caller: Paz Browne    Relationship: Self    Best call back number: [phone]    Medication needed:   Requested Prescriptions     Pending Prescriptions Disp Refills   • HYDROcodone-acetaminophen (NORCO) 5-325 MG per tablet 90 tablet 0     Sig: Take 1 tablet by mouth Every 8 (Eight) Hours As Needed for Moderate Pain . Chronic pain medicine for low back pain       When do you need the refill by: ASAP  What additional details did the patient provide when requesting the medication:     Does the patient have less than a 3 day supply:  [x] Yes  [] No    What is the patient's preferred pharmacy: [facility]/PHARMACY #[id number] [place], [place] - [address] [place] [address] AT [facility] - [phone]  - [phone] FX         PATIENT IS ALSO ASKING IF REFERRAL HAS BEEN MADE TO DR HEREDIA  FOR UPPER AND LOWER GI TESTING     PLEASE CALL PATIENT AND ADVISE

## 2021-03-23 RX ORDER — MAGNESIUM OXIDE 400 MG/1
TABLET ORAL
Qty: 90 TABLET | Refills: 1 | Status: SHIPPED | OUTPATIENT
Start: 2021-03-23 | End: 2021-11-08

## 2021-03-25 ENCOUNTER — IMMUNIZATION (OUTPATIENT)
Dept: VACCINE CLINIC | Facility: HOSPITAL | Age: 66
End: 2021-03-25

## 2021-03-25 PROCEDURE — 91300 HC SARSCOV02 VAC 30MCG/0.3ML IM: CPT | Performed by: INTERNAL MEDICINE

## 2021-03-25 PROCEDURE — 0002A: CPT | Performed by: INTERNAL MEDICINE

## 2021-03-30 ENCOUNTER — OFFICE VISIT (OUTPATIENT)
Dept: GASTROENTEROLOGY | Facility: CLINIC | Age: 66
End: 2021-03-30

## 2021-03-30 VITALS — HEIGHT: 67 IN | WEIGHT: 143 LBS | TEMPERATURE: 97.7 F | BODY MASS INDEX: 22.44 KG/M2

## 2021-03-30 DIAGNOSIS — D12.6 ADENOMATOUS POLYP OF COLON, UNSPECIFIED PART OF COLON: ICD-10-CM

## 2021-03-30 DIAGNOSIS — D50.9 IRON DEFICIENCY ANEMIA, UNSPECIFIED IRON DEFICIENCY ANEMIA TYPE: Primary | ICD-10-CM

## 2021-03-30 DIAGNOSIS — R19.5 DARK STOOLS: ICD-10-CM

## 2021-03-30 DIAGNOSIS — K21.9 GASTROESOPHAGEAL REFLUX DISEASE WITHOUT ESOPHAGITIS: ICD-10-CM

## 2021-03-30 PROCEDURE — 99214 OFFICE O/P EST MOD 30 MIN: CPT | Performed by: NURSE PRACTITIONER

## 2021-03-30 NOTE — PROGRESS NOTES
Chief Complaint   Patient presents with   • Anemia       Paz Browne is a  65 y.o. female here for a follow up visit for anemia.    HPI  65-year-old female presents today accompanied by her  for follow-up visit for iron deficiency anemia.  She is a patient of Dr. Devine.  She is new to me today.  She last underwent EGD and colonoscopy on 12/2017.  She does have a history of GI bleeds in the past when she was on blood thinners for her A. fib.  She also has a history of multiple adenomatous colon polyps.  She denies any significant GI family history due to being adopted.  She tells me for a long time she has been iron deficiency anemic and been on iron.  She was recently seen by her PCP after being home in quarantine for Covid and had a blood drawn.  She was found to be anemic again with her hemoglobin down to 10.7 when she is normally in the 12 and 13 range.  She was taking iron 2 pills daily and was recently told to quit when her iron level was elevated per her PCP.  Patient tells me while on the iron her stool was dark black.  Now being off the iron she sees black specks in her stool.  She does have a history of GERD and tells me she does well on omeprazole 40 mg daily.  She is no longer needing MiraLAX but takes a stool softener from time to time to keep her bowels regular.  She is on an 81 mg baby aspirin currently.  She does have a history of a hiatal hernia.  She denies any dysphagia, reflux, abdominal pain, nausea and vomiting, diarrhea, constipation or rectal bleeding.  She admits her appetite is okay and her weight is stable.  She does have a history of CHF, mitral valve replacement, COPD.  Past Medical History:   Diagnosis Date   • VAN (acute kidney injury) (CMS/HCC)    • Anemia    • Atrial flutter (CMS/HCC)     cardioversion   • Cataract    • Celiac artery stenosis (CMS/HCC)    • Chronic respiratory failure with hypoxia (CMS/HCC)    • Colon polyp    • COPD (chronic obstructive pulmonary disease)  (CMS/[facility])    • Emphysema of lung (CMS/[facility])    • GI bleed    • Hiatal hernia    • History of CHF (congestive heart failure)     due to MR   • History of mitral valve replacement with tissue graft    • Hyperlipidemia    • Hypertension    • Intertrigo    • Long term (current) use of anticoagulants    • Mitral regurgitation     s/p tissue MVR   • PAF (paroxysmal atrial fibrillation) (CMS/[facility])     s/p MAZE   • Pneumonia    • Pulmonary hypertension (CMS/[facility])    • S/P TVR (tricuspid valve repair) 7/7/2016       Past Surgical History:   Procedure Laterality Date   • CARDIAC CATHETERIZATION  09/01/2014    Right dominant systemt, normal coronary arteries.    • CARDIAC CATHETERIZATION Left 6/10/2016    Procedure: Cardiac catheterization;  Surgeon: Sergei Hall MD;  Location: [facility] CATH INVASIVE LOCATION;  Service:    • CARDIAC CATHETERIZATION N/A 6/10/2016    Procedure: Right Heart Cath;  Surgeon: Sergei Hall MD;  Location: [facility] CATH INVASIVE LOCATION;  Service:    • CATARACT EXTRACTION     • COLONOSCOPY     • COLONOSCOPY N/A 8/4/2017    Procedure: COLONOSCOPY TO CECUM/TI WITH POLYPECTOMY ( COLD BX);  Surgeon: Cleveland Devine MD;  Location: [facility] ENDOSCOPY;  Service:    • COLONOSCOPY N/A 8/10/2017    Procedure: COLONOSCOPY to cecum and TI with 2 clips placed at transverse;  Surgeon: Earnest PALOMO MD;  Location: [facility] ENDOSCOPY;  Service:    • COLONOSCOPY N/A 12/22/2017    Procedure: COLONOSCOPY INTO CECUM WITH COLD POLYPECTOMIES;  Surgeon: Cleveland Devine MD;  Location: [facility] ENDOSCOPY;  Service:    • ENDOSCOPY N/A 8/17/2017    Procedure: ESOPHAGOGASTRODUODENOSCOPY;  Surgeon: Porsha Ruby MD;  Location: [facility] ENDOSCOPY;  Service:    • ENDOSCOPY N/A 12/22/2017    Procedure: ESOPHAGOGASTRODUODENOSCOPY WITH BIOPSIES;  Surgeon: Cleveland Devine MD;  Location: [facility] ENDOSCOPY;  Service:    • GALLBLADDER SURGERY     • HEMORRHOIDECTOMY     • HYSTERECTOMY     • KIDNEY SURGERY  04/22/2013    Stent placement   • MAZE PROCEDURE      • MITRAL VALVE REPLACEMENT     • TONSILLECTOMY     • TRICUSPID VALVE REPLACEMENT         Scheduled Meds:    Continuous Infusions:No current facility-administered medications for this visit.      PRN Meds:.    Allergies   Allergen Reactions   • Bupropion Itching   • Cephalexin Itching     Tolerated piperacillin/tazobactam   • Metoprolol Itching       Social History     Socioeconomic History   • Marital status:      Spouse name: Not on file   • Number of children: Not on file   • Years of education: Not on file   • Highest education level: Not on file   Tobacco Use   • Smoking status: Former Smoker     Quit date:      Years since quittin.2   • Smokeless tobacco: Never Used   Substance and Sexual Activity   • Alcohol use: No     Comment: caffeine use   • Drug use: No   • Sexual activity: Defer       Family History   Adopted: Yes   Problem Relation Age of Onset   • No Known Problems Mother    • No Known Problems Father        Review of Systems   Constitutional: Negative for appetite change, chills, diaphoresis, fatigue, fever and unexpected weight change.   HENT: Negative for nosebleeds, postnasal drip, sore throat, trouble swallowing and voice change.    Respiratory: Negative for cough, choking, chest tightness, shortness of breath and wheezing.    Cardiovascular: Negative for chest pain, palpitations and leg swelling.   Gastrointestinal: Positive for blood in stool. Negative for abdominal distention, abdominal pain, anal bleeding, constipation, diarrhea, nausea, rectal pain and vomiting.   Endocrine: Negative for polydipsia, polyphagia and polyuria.   Musculoskeletal: Negative for gait problem.   Skin: Negative for rash and wound.   Allergic/Immunologic: Negative for food allergies.   Neurological: Negative for dizziness, speech difficulty and light-headedness.   Psychiatric/Behavioral: Negative for confusion, self-injury, sleep disturbance and suicidal ideas.       Vitals:    21 1508   Temp:  97.7 °F (36.5 °C)       Physical Exam  Constitutional:       General: She is not in acute distress.     Appearance: She is well-developed. She is not ill-appearing.   HENT:      Head: Normocephalic.   Eyes:      Pupils: Pupils are equal, round, and reactive to light.   Cardiovascular:      Rate and Rhythm: Normal rate and regular rhythm.      Heart sounds: Normal heart sounds.   Pulmonary:      Effort: Pulmonary effort is normal.      Breath sounds: Normal breath sounds.   Abdominal:      General: Bowel sounds are normal. There is no distension.      Palpations: Abdomen is soft. There is no mass.      Tenderness: There is no abdominal tenderness. There is no guarding or rebound.      Hernia: No hernia is present.   Musculoskeletal:         General: Normal range of motion.   Skin:     General: Skin is warm and dry.   Neurological:      Mental Status: She is alert and oriented to person, place, and time.   Psychiatric:         Speech: Speech normal.         Behavior: Behavior normal.         Judgment: Judgment normal.         No radiology results for the last 7 days     Assessment and plan     1. Iron deficiency anemia, unspecified iron deficiency anemia type  - Case Request; Standing  - Case Request  - CBC & Differential    2. Adenomatous polyp of colon, unspecified part of colon  - Case Request; Standing  - Case Request    3. Dark stools  - Case Request; Standing  - Case Request  - CBC & Differential    4. Gastroesophageal reflux disease without esophagitis  - Case Request; Standing  - Case Request    Reviewed most recent labs with her today.  Patient has been referred to hematology per her PCP for iron deficiency work-up.  Given her history and current symptoms recommend EGD and colonoscopy with Dr. Devine for further evaluation.  Patient is agreeable to the scopes.  Will repeat CBC today.  Given her health history we will also go ahead and get a cardiac and pulmonary clearance before proceeding with the scopes.  I  will have nursing obtain this.  Patient to call the office with any issues.  Patient to follow-up with me after her scopes.  Patient is agreeable to the plan.

## 2021-03-31 ENCOUNTER — TELEPHONE (OUTPATIENT)
Dept: GASTROENTEROLOGY | Facility: CLINIC | Age: 66
End: 2021-03-31

## 2021-03-31 LAB
BASOPHILS # BLD AUTO: 0.04 10*3/MM3 (ref 0–0.2)
BASOPHILS NFR BLD AUTO: 0.6 % (ref 0–1.5)
EOSINOPHIL # BLD AUTO: 0.26 10*3/MM3 (ref 0–0.4)
EOSINOPHIL NFR BLD AUTO: 3.7 % (ref 0.3–6.2)
ERYTHROCYTE [DISTWIDTH] IN BLOOD BY AUTOMATED COUNT: 11.8 % (ref 12.3–15.4)
HCT VFR BLD AUTO: 34.9 % (ref 34–46.6)
HGB BLD-MCNC: 11.7 G/DL (ref 12–15.9)
IMM GRANULOCYTES # BLD AUTO: 0.01 10*3/MM3 (ref 0–0.05)
IMM GRANULOCYTES NFR BLD AUTO: 0.1 % (ref 0–0.5)
LYMPHOCYTES # BLD AUTO: 1.53 10*3/MM3 (ref 0.7–3.1)
LYMPHOCYTES NFR BLD AUTO: 21.6 % (ref 19.6–45.3)
MCH RBC QN AUTO: 31.5 PG (ref 26.6–33)
MCHC RBC AUTO-ENTMCNC: 33.5 G/DL (ref 31.5–35.7)
MCV RBC AUTO: 94.1 FL (ref 79–97)
MONOCYTES # BLD AUTO: 0.33 10*3/MM3 (ref 0.1–0.9)
MONOCYTES NFR BLD AUTO: 4.7 % (ref 5–12)
NEUTROPHILS # BLD AUTO: 4.9 10*3/MM3 (ref 1.7–7)
NEUTROPHILS NFR BLD AUTO: 69.3 % (ref 42.7–76)
NRBC BLD AUTO-RTO: 0 /100 WBC (ref 0–0.2)
PLATELET # BLD AUTO: 216 10*3/MM3 (ref 140–450)
RBC # BLD AUTO: 3.71 10*6/MM3 (ref 3.77–5.28)
WBC # BLD AUTO: 7.07 10*3/MM3 (ref 3.4–10.8)

## 2021-03-31 NOTE — TELEPHONE ENCOUNTER
----- Message from BA Fofana sent at 3/30/2021  3:37 PM EDT -----  Please obtain cardiac and pulmonary clearance for the patient to proceed with EGD and colonoscopy with Dr. Hicks given her extensive health history.  Thanks

## 2021-03-31 NOTE — TELEPHONE ENCOUNTER
----- Message from BA Fofana sent at 3/31/2021  1:56 PM EDT -----  Please call the patient and let her know her hemoglobin is much better at 11.7.  It was 10.73 weeks ago.  This is good news.  Thanks

## 2021-03-31 NOTE — TELEPHONE ENCOUNTER
Call to pt.  States Pulmonologist is Dr Anatoliy Melo.  Request for pulmonary clearance faxed to [phone] - confirmation received.     Message to BA Charles with Cardiology, requesting cardiac clearance for scopes to be scheduled.

## 2021-04-09 ENCOUNTER — LAB (OUTPATIENT)
Dept: LAB | Facility: HOSPITAL | Age: 66
End: 2021-04-09

## 2021-04-09 ENCOUNTER — CONSULT (OUTPATIENT)
Dept: ONCOLOGY | Facility: CLINIC | Age: 66
End: 2021-04-09

## 2021-04-09 VITALS
HEIGHT: 64 IN | TEMPERATURE: 98 F | WEIGHT: 139.5 LBS | RESPIRATION RATE: 16 BRPM | HEART RATE: 79 BPM | BODY MASS INDEX: 23.82 KG/M2 | SYSTOLIC BLOOD PRESSURE: 122 MMHG | OXYGEN SATURATION: 99 % | DIASTOLIC BLOOD PRESSURE: 59 MMHG

## 2021-04-09 DIAGNOSIS — D50.0 IRON DEFICIENCY ANEMIA DUE TO CHRONIC BLOOD LOSS: Primary | ICD-10-CM

## 2021-04-09 DIAGNOSIS — D64.9 ANEMIA, UNSPECIFIED TYPE: ICD-10-CM

## 2021-04-09 DIAGNOSIS — R79.89 ELEVATED FERRITIN: Primary | ICD-10-CM

## 2021-04-09 LAB
BASOPHILS # BLD AUTO: 0.07 10*3/MM3 (ref 0–0.2)
BASOPHILS NFR BLD AUTO: 0.7 % (ref 0–1.5)
DEPRECATED RDW RBC AUTO: 40.5 FL (ref 37–54)
EOSINOPHIL # BLD AUTO: 0.19 10*3/MM3 (ref 0–0.4)
EOSINOPHIL NFR BLD AUTO: 2 % (ref 0.3–6.2)
ERYTHROCYTE [DISTWIDTH] IN BLOOD BY AUTOMATED COUNT: 11.9 % (ref 12.3–15.4)
HCT VFR BLD AUTO: 37.8 % (ref 34–46.6)
HGB BLD-MCNC: 12.5 G/DL (ref 12–15.9)
IMM GRANULOCYTES # BLD AUTO: 0.02 10*3/MM3 (ref 0–0.05)
IMM GRANULOCYTES NFR BLD AUTO: 0.2 % (ref 0–0.5)
LYMPHOCYTES # BLD AUTO: 2.27 10*3/MM3 (ref 0.7–3.1)
LYMPHOCYTES NFR BLD AUTO: 23.9 % (ref 19.6–45.3)
MCH RBC QN AUTO: 30.9 PG (ref 26.6–33)
MCHC RBC AUTO-ENTMCNC: 33.1 G/DL (ref 31.5–35.7)
MCV RBC AUTO: 93.6 FL (ref 79–97)
MONOCYTES # BLD AUTO: 0.42 10*3/MM3 (ref 0.1–0.9)
MONOCYTES NFR BLD AUTO: 4.4 % (ref 5–12)
NEUTROPHILS NFR BLD AUTO: 6.52 10*3/MM3 (ref 1.7–7)
NEUTROPHILS NFR BLD AUTO: 68.8 % (ref 42.7–76)
NRBC BLD AUTO-RTO: 0 /100 WBC (ref 0–0.2)
PLATELET # BLD AUTO: 190 10*3/MM3 (ref 140–450)
PMV BLD AUTO: 11 FL (ref 6–12)
RBC # BLD AUTO: 4.04 10*6/MM3 (ref 3.77–5.28)
WBC # BLD AUTO: 9.49 10*3/MM3 (ref 3.4–10.8)

## 2021-04-09 PROCEDURE — 99203 OFFICE O/P NEW LOW 30 MIN: CPT | Performed by: INTERNAL MEDICINE

## 2021-04-09 PROCEDURE — 36415 COLL VENOUS BLD VENIPUNCTURE: CPT

## 2021-04-09 PROCEDURE — 85025 COMPLETE CBC W/AUTO DIFF WBC: CPT

## 2021-04-09 NOTE — PROGRESS NOTES
March OB was positive. Will see Dr. Devine and GI scope 5/16/2021          .     REASON FOR CONSULTATION:     Provide an opinion on any further workup or treatment of elevated ferritin and anemia.                              REQUESTING PHYSICIAN: Javan Martinez MD     RECORDS OBTAINED:  Records of the patient's history including those obtained from the referring provider were reviewed and summarized in detail.    HISTORY OF PRESENT ILLNESS:  The patient is a 65 y.o. year old female patient with history of hypertension, emphysema secondary to long time cigarette smoking, currently on home O2 at 3 liters per minute, and history of both tricuspid valve repair and mitral valve replacement with tissue graft, and also history of GI bleeding in 2017, who presented today on 4/9/2021 to the clinic for initial evaluation because of elevated ferritin and anemia. The patient is accompanied by her  who helped with history.    The  reports that the patient recently had positive stool occult blood test. She is scheduled to see Cleveland Devine MD, and having both EGD and colonoscopy in the middle of 05/2021.  Her  also reports the patient was taking oral iron for about 3 years, and was recently discontinued because of elevated ferritin. The patient has chronic fatigue. She reports no visible blood per rectum, no melena. She denies dizziness or fainting.     Recent laboratory study on 03/09/2021 reported ferritin 794 ng/mL, chemistry lab reported creatinine 1.23 with normal electrolytes and glucose.  Laboratory studies on 03/08/2021 serum iron was 57, TIBC 346 and iron saturation 16% together with super therapeutic folate more than 20 ng/mL and vitamin B12 at 1018 pg/mL. Her hemoglobin was 10.7 and hematocrit 32.9.    Laboratory study today on 4/9/2020 reported normal hemoglobin 12.5, MCV 93.6, MCHC 33.1 and hematocrit 37.8%. She has normal WBC 9490, including ANC 6520, lymphocytes 2270 and normal platelets  190,000.     I reviewed her peripheral blood smear today, unremarkable, no evidence of hematologic malignancy. Morphology of RBC is normal. No target cells, no schistocytes, no angie cells. Morphology of WBC and platelets also normal.     This patient had a history of GI bleeding, required hospitalization in Aug 2017 with a dora hemoglobin 8.0 on 8/15/2017.  Iron study on 8/13/2017 reported serum ferritin 51.4 ng/mL, free iron 25 TIBC 446 and iron saturation 6% with vitamin B12 level 892 pg/mL, and RBC folate 1935 ng/mL.     Subsequently patient had multiple ferritin study, reporting ferritin 96 ng/mL on 11/16/2017, ferritin 116.9 ng/mL and iron saturation 17% with free iron 61 TIBC 361 on 12/22/2017, RBC folate was 1609 and vitamin B12 at 855.  However hemoglobin was 10.8 and MCV 88.5 MCHC 32.0.   On 2/21/2018 ferritin was 145.6 ng/mL and a hemoglobin 10.2..  There is no iron labs in 2 3/8/2021 as mentioned above.        Past Medical History:   Diagnosis Date   • VAN (acute kidney injury) (CMS/HCC)    • Anemia    • Atrial flutter (CMS/HCC)     cardioversion   • Cataract    • Celiac artery stenosis (CMS/HCC)    • Chronic respiratory failure with hypoxia (CMS/HCC)    • Colon polyp    • COPD (chronic obstructive pulmonary disease) (CMS/HCC)    • Emphysema of lung (CMS/HCC)    • GI bleed    • Hiatal hernia    • History of CHF (congestive heart failure)     due to MR   • History of mitral valve replacement with tissue graft    • Hyperlipidemia    • Hypertension    • Intertrigo    • Long term (current) use of anticoagulants    • Mitral regurgitation     s/p tissue MVR   • PAF (paroxysmal atrial fibrillation) (CMS/HCC)     s/p MAZE   • Pneumonia    • Pulmonary hypertension (CMS/HCC)    • S/P TVR (tricuspid valve repair) 7/7/2016   Hepatitis B   Former heavy smoker.     Past Surgical History:   Procedure Laterality Date   • CARDIAC CATHETERIZATION  09/01/2014    Right dominant systemt, normal coronary arteries.    •  CARDIAC CATHETERIZATION Left 6/10/2016    Procedure: Cardiac catheterization;  Surgeon: Sergei Hall MD;  Location: [facility] CATH INVASIVE LOCATION;  Service:    • CARDIAC CATHETERIZATION N/A 6/10/2016    Procedure: Right Heart Cath;  Surgeon: Sergei Hall MD;  Location: [facility] CATH INVASIVE LOCATION;  Service:    • CATARACT EXTRACTION     • COLONOSCOPY     • COLONOSCOPY N/A 8/4/2017    Procedure: COLONOSCOPY TO CECUM/TI WITH POLYPECTOMY ( COLD BX);  Surgeon: Cleveland Devine MD;  Location: [facility] ENDOSCOPY;  Service:    • COLONOSCOPY N/A 8/10/2017    Procedure: COLONOSCOPY to cecum and TI with 2 clips placed at transverse;  Surgeon: Earnest PALOMO MD;  Location: [facility] ENDOSCOPY;  Service:    • COLONOSCOPY N/A 12/22/2017    Procedure: COLONOSCOPY INTO CECUM WITH COLD POLYPECTOMIES;  Surgeon: Cleveland Devine MD;  Location: [facility] ENDOSCOPY;  Service:    • ENDOSCOPY N/A 8/17/2017    Procedure: ESOPHAGOGASTRODUODENOSCOPY;  Surgeon: Porsha Ruby MD;  Location: [facility] ENDOSCOPY;  Service:    • ENDOSCOPY N/A 12/22/2017    Procedure: ESOPHAGOGASTRODUODENOSCOPY WITH BIOPSIES;  Surgeon: Cleveland Devine MD;  Location: [facility] ENDOSCOPY;  Service:    • GALLBLADDER SURGERY     • HEMORRHOIDECTOMY     • HYSTERECTOMY     • KIDNEY SURGERY  04/22/2013    Stent placement   • MAZE PROCEDURE     • MITRAL VALVE REPLACEMENT     • TONSILLECTOMY     • TRICUSPID VALVE REPLACEMENT         MEDICATIONS    Current Outpatient Medications:   •  albuterol (PROVENTIL) (2.5 MG/3ML) 0.083% nebulizer solution, INHALE 1 VIAL BY MOUTH EVERY 4 HOURS AS NEEDED FOR WHEEZING, Disp: 150 mL, Rfl: 3  •  albuterol sulfate  (90 Base) MCG/ACT inhaler, Inhale 2 puffs Every 4 (Four) Hours As Needed for Wheezing., Disp: 18 g, Rfl: 3  •  aspirin 81 MG EC tablet, Take 1 tablet by mouth Daily., Disp: , Rfl:   •  atorvastatin (LIPITOR) 40 MG tablet, TAKE 1 TABLET BY MOUTH EVERY DAY, Disp: 90 tablet, Rfl: 2  •  benzonatate (Tessalon Perles) 100 MG capsule, Take 1  capsule by mouth 2 (Two) Times a Day As Needed for Cough (2 p.o. twice daily as needed cough)., Disp: 30 capsule, Rfl: 3  •  carvedilol (COREG) 6.25 MG tablet, TAKE 1 TABLET BY MOUTH TWICE A DAY WITH MEALS, Disp: 180 tablet, Rfl: 2  •  cyclobenzaprine (FLEXERIL) 10 MG tablet, TAKE 1 TABLET BY MOUTH AT BEDTIME, Disp: 90 tablet, Rfl: 3  •  doxycycline (VIBRAMYICN) 100 MG tablet, Take 1 tablet by mouth 2 (Two) Times a Day., Disp: 20 tablet, Rfl: 0  •  fluconazole (DIFLUCAN) 150 MG tablet, 1 p.o. as needed yeast day 1, 4, 7, Disp: 3 tablet, Rfl: 0  •  fluticasone-salmeterol (Advair Diskus) 250-50 MCG/DOSE DISKUS, Inhale 1 puff 2 (Two) Times a Day., Disp: 3 each, Rfl: 3  •  furosemide (LASIX) 20 MG tablet, TAKE 1 TABLET BY MOUTH EVERY DAY, Disp: 90 tablet, Rfl: 1  •  furosemide (LASIX) 40 MG tablet, TAKE 1 TABLET BY MOUTH TWICE A DAY, Disp: 180 tablet, Rfl: 0  •  gabapentin (NEURONTIN) 600 MG tablet, Take 1 tablet by mouth 3 (Three) Times a Day. Shingles pain 3 months, Disp: 270 tablet, Rfl: 1  •  HYDROcodone-acetaminophen (NORCO) 5-325 MG per tablet, Take 1 tablet by mouth Every 8 (Eight) Hours As Needed for Moderate Pain . Chronic pain medicine for low back pain, Disp: 90 tablet, Rfl: 0  •  lisinopril (PRINIVIL,ZESTRIL) 40 MG tablet, TAKE 1 TABLET BY MOUTH EVERY DAY, Disp: 90 tablet, Rfl: 1  •  magnesium oxide (MAG-OX) 400 MG tablet, TAKE 1 TABLET BY MOUTH EVERY DAY, Disp: 90 tablet, Rfl: 1  •  meclizine (ANTIVERT) 25 MG tablet, Take 1 tablet by mouth 3 (Three) Times a Day As Needed for Dizziness. prn, Disp: 30 tablet, Rfl: 3  •  Multiple Vitamins-Minerals (MULTIVITAL) tablet, Take 1 tablet by mouth Daily., Disp: , Rfl:   •  Nebulizer device, 1 Units 4 (Four) Times a Day As Needed (Wheezing). J44.1 j20.8, Disp: 1 each, Rfl: 0  •  O2 (OXYGEN), Inhale 3 L/min Continuous., Disp: , Rfl:   •  omeprazole (priLOSEC) 40 MG capsule, TAKE 1 CAPSULE BY MOUTH EVERY DAY, Disp: 90 capsule, Rfl: 1  •  ondansetron (ZOFRAN) 4 MG  tablet, Take 4 mg by mouth Every 12 (Twelve) Hours As Needed for Nausea or Vomiting., Disp: , Rfl:   •  polyethylene glycol (MIRALAX) packet, Take 17 g by mouth 2 (Two) Times a Day. (Patient taking differently: Take 17 g by mouth 2 (Two) Times a Day As Needed.), Disp: , Rfl:   •  potassium chloride (KLOR-CON) 20 MEQ CR tablet, TAKE 1 TABLET BY MOUTH THREE TIMES A DAY, Disp: 270 tablet, Rfl: 1  •  roflumilast (DALIRESP) 500 MCG tablet tablet, Take 1 tablet by mouth Daily., Disp: 90 tablet, Rfl: 1  •  rOPINIRole (REQUIP) 2 MG tablet, TAKE 1 TABLET BY MOUTH EVERY DAY EVERY NIGHT, Disp: 90 tablet, Rfl: 2  •  sertraline (ZOLOFT) 100 MG tablet, TAKE 1 TABLET BY MOUTH EVERY DAY, Disp: 90 tablet, Rfl: 1  •  tiotropium (Spiriva HandiHaler) 18 MCG per inhalation capsule, Place 1 capsule into inhaler and inhale Daily., Disp: 90 capsule, Rfl: 3  •  valACYclovir (Valtrex) 1000 MG tablet, 1 p.o. 3 times daily x1 week, Disp: 21 tablet, Rfl: 0  •  zaleplon (SONATA) 10 MG capsule, TAKE 1 CAPSULE BY MOUTH EVERY DAY AT NIGHT, Disp: 30 capsule, Rfl: 3    ALLERGIES:     Allergies   Allergen Reactions   • Bupropion Itching   • Cephalexin Itching     Tolerated piperacillin/tazobactam   • Metoprolol Itching       SOCIAL HISTORY:       Social History     Socioeconomic History   • Marital status:      Spouse name: Not on file   • Number of children: Not on file   • Years of education: Not on file   • Highest education level: Not on file   Tobacco Use   • Smoking status: Former Smoker     Quit date:      Years since quittin.2   • Smokeless tobacco: Never Used   Substance and Sexual Activity   • Alcohol use: No     Comment: caffeine use   • Drug use: No   • Sexual activity: Defer         FAMILY HISTORY:  Family History   Adopted: Yes   Problem Relation Age of Onset   • No Known Problems Mother    • No Known Problems Father    · Adopted.  No details.  Only brother  of COVID in his 80's.  Mother probably had TB when giving  "birth to her.     REVIEW OF SYSTEMS:  Review of Systems   Constitutional: Positive for activity change (Due to exertional dyspnea) and fatigue. Negative for appetite change, diaphoresis, fever and unexpected weight change.   HENT: Positive for dental problem (Has dentures) and hearing loss. Negative for sore throat.         Dry mouth   Eyes: Negative for photophobia and visual disturbance.   Respiratory: Positive for shortness of breath (On home O2 3 L/min). Negative for cough.    Cardiovascular: Positive for chest pain.   Gastrointestinal: Positive for nausea. Negative for abdominal pain, anal bleeding, blood in stool (Stool occult blood test was positive, however no visible melena hematochezia) and vomiting.   Endocrine: Negative for cold intolerance and heat intolerance.   Genitourinary: Negative for dysuria and hematuria.   Musculoskeletal: Positive for back pain. Negative for arthralgias and joint swelling.   Skin: Negative for pallor and rash.        Dry skin and pruritus   Neurological: Positive for dizziness (Vertigo), numbness and headaches.   Hematological: Negative for adenopathy. Bruises/bleeds easily.   Psychiatric/Behavioral: Negative for agitation and confusion.              Vitals:    04/09/21 0955   BP: 122/59   Pulse: 79   Resp: 16   Temp: 98 °F (36.7 °C)   SpO2: 99%   Weight: 63.3 kg (139 lb 8 oz)   Height: 163 cm (64.17\")   PainSc:   7   PainLoc: Comment: lower back and lungs     Current Status 4/9/2021   ECOG score 0      PHYSICAL EXAM:      CONSTITUTIONAL:  Vital signs reviewed.  Well-developed female, sitting in wheelchair.  No distress, looks comfortable.  EYES:  Conjunctiva and lids unremarkable.  PERRLA  EARS,NOSE,MOUTH,THROAT:  Patient wears mask due to the pandemic coronavirus infection.   RESPIRATORY:  Normal respiratory effort.  Lungs clear to auscultation bilaterally.  CARDIOVASCULAR: Regular rhythm and the rate.  Normal S1, S2.  No murmurs rubs or gallops.  No significant lower " extremity edema.  GASTROINTESTINAL: Abdomen appears unremarkable.  Nontender.  No hepatomegaly.  No splenomegaly.  Bowel sounds normal.  LYMPHATIC:  No cervical, supraclavicular, axillary lymphadenopathy.  MUSCULOSKELETAL:  Unremarkable digits/nails.  No cyanosis or clubbing.  SKIN:  Warm.  No rashes.  PSYCHIATRIC:  Normal judgment and insight.  Normal mood and affect.      RECENT LABS:  WBC   Date Value Ref Range Status   04/09/2021 9.49 3.40 - 10.80 10*3/mm3 Final   03/30/2021 7.07 3.40 - 10.80 10*3/mm3 Final     RBC   Date Value Ref Range Status   04/09/2021 4.04 3.77 - 5.28 10*6/mm3 Final   03/30/2021 3.71 (L) 3.77 - 5.28 10*6/mm3 Final     Hemoglobin   Date Value Ref Range Status   04/09/2021 12.5 12.0 - 15.9 g/dL Final     Hematocrit   Date Value Ref Range Status   04/09/2021 37.8 34.0 - 46.6 % Final     MCV   Date Value Ref Range Status   04/09/2021 93.6 79.0 - 97.0 fL Final     MCH   Date Value Ref Range Status   04/09/2021 30.9 26.6 - 33.0 pg Final     MCHC   Date Value Ref Range Status   04/09/2021 33.1 31.5 - 35.7 g/dL Final     RDW   Date Value Ref Range Status   04/09/2021 11.9 (L) 12.3 - 15.4 % Final     RDW-SD   Date Value Ref Range Status   04/09/2021 40.5 37.0 - 54.0 fl Final     MPV   Date Value Ref Range Status   04/09/2021 11.0 6.0 - 12.0 fL Final     Platelets   Date Value Ref Range Status   04/09/2021 190 140 - 450 10*3/mm3 Final     Neutrophil %   Date Value Ref Range Status   04/09/2021 68.8 42.7 - 76.0 % Final     Lymphocyte %   Date Value Ref Range Status   04/09/2021 23.9 19.6 - 45.3 % Final     Monocyte %   Date Value Ref Range Status   04/09/2021 4.4 (L) 5.0 - 12.0 % Final     Eosinophil %   Date Value Ref Range Status   04/09/2021 2.0 0.3 - 6.2 % Final     Basophil %   Date Value Ref Range Status   04/09/2021 0.7 0.0 - 1.5 % Final     Immature Grans %   Date Value Ref Range Status   04/09/2021 0.2 0.0 - 0.5 % Final     Neutrophils, Absolute   Date Value Ref Range Status   04/09/2021  6.52 1.70 - 7.00 10*3/mm3 Final     Lymphocytes, Absolute   Date Value Ref Range Status   04/09/2021 2.27 0.70 - 3.10 10*3/mm3 Final     Monocytes, Absolute   Date Value Ref Range Status   04/09/2021 0.42 0.10 - 0.90 10*3/mm3 Final     Eosinophils, Absolute   Date Value Ref Range Status   04/09/2021 0.19 0.00 - 0.40 10*3/mm3 Final     Basophils, Absolute   Date Value Ref Range Status   04/09/2021 0.07 0.00 - 0.20 10*3/mm3 Final     Immature Grans, Absolute   Date Value Ref Range Status   04/09/2021 0.02 0.00 - 0.05 10*3/mm3 Final     nRBC   Date Value Ref Range Status   04/09/2021 0.0 0.0 - 0.2 /100 WBC Final     Lab Results   Component Value Date    IRON 57 03/08/2021    TIBC 346 03/08/2021    FERRITIN 794.00 (H) 03/09/2021     Lab Results   Component Value Date    GLUCOSE 79 03/09/2021    BUN 20 03/09/2021    CREATININE 1.23 (H) 03/09/2021    EGFRIFNONA 44 (L) 03/09/2021    BCR 16.3 03/09/2021    K 4.7 03/09/2021    CO2 27.7 03/09/2021    CALCIUM 10.2 03/09/2021    ALBUMIN 4.60 03/02/2021    AST 17 03/02/2021    ALT 13 03/02/2021         Assessment/Plan   Anemia with elevated ferritin.   Patient is a 65 years old female with history of iron deficiency anemia and GI bleeding in 2017, for which she has been taking oral iron for the past 2-3 years according to her .  Recent laboratory studies in March 2021 reported normalized iron saturation but supratherapeutic ferritin level.     She had hospitalization in Aug 2017 with due to GI bleeding and clearly had iron deficiency at that point.  Since that time she has multiple iron studies with gradually improving ferritin level.     On 3/9/2020 when she had significant elevated ferritin 94 ng/mL, with borderline low iron saturation 16% and the anemia with Hb 10.7.    Discussed with the patient and her , this patient has no evidence of iron deficiency at this point based on her recent iron study on 03/09/2021 and 03/08/2021.  Instead her elevated ferritin  indicates that she has anemia likely due to chronic inflammatory disease.  At the meantime, she had super therapeutic B12 level and folic acid level. She is taking oral multivitamin.     This patient also has mild renal insufficiency, by calculation her creatinine clearance falls in the category of stage III chronic renal insufficiency.  Nevertheless patient is not a candidate for erythropoietin treatment.    The patient reports positive occult blood from stool sample recently and she is waiting for EGD and colonoscopy examination scheduled in the middle of 05/2021. I think this is necessary. Hopefully she has no malignancy discovered. I will bring her to reevaluate after that.     PLAN:   1. No need for oral iron treatment at this point.   2. Follow up with Cleveland Devine MD, for EGD and colonoscopy examination.   3. I will bring the patient back for reevaluation in the end of 05/2021. We will repeat iron study at that time together with CBC.     Discussed with the patient and her  and they voiced understanding.         MATTHIAS NAIR M.D., Ph.D.    4/9/2021      CC:   MD Cleveland Johnston M.D.      Dictated using Dragon Dictation.

## 2021-04-14 RX ORDER — POTASSIUM CHLORIDE 20 MEQ/1
TABLET, EXTENDED RELEASE ORAL
Qty: 270 TABLET | Refills: 1 | Status: SHIPPED | OUTPATIENT
Start: 2021-04-14 | End: 2021-05-12 | Stop reason: HOSPADM

## 2021-04-16 DIAGNOSIS — Z79.891 LONG TERM CURRENT USE OF OPIATE ANALGESIC: ICD-10-CM

## 2021-04-16 NOTE — TELEPHONE ENCOUNTER
Caller: Paz Browne    Relationship: Self    Best call back number: [phone]     Medication needed:   Requested Prescriptions     Pending Prescriptions Disp Refills   • HYDROcodone-acetaminophen (NORCO) 5-325 MG per tablet 90 tablet 0     Sig: Take 1 tablet by mouth Every 8 (Eight) Hours As Needed for Moderate Pain . Chronic pain medicine for low back pain       When do you need the refill by: ASAP    Does the patient have less than a 3 day supply:  [] Yes  [x] No    What is the patient's preferred pharmacy: [id number] - [place], [place] - [address] [place] [address] - [phone]  - [phone] FX

## 2021-04-17 RX ORDER — HYDROCODONE BITARTRATE AND ACETAMINOPHEN 5; 325 MG/1; MG/1
1 TABLET ORAL EVERY 8 HOURS PRN
Qty: 90 TABLET | Refills: 0 | Status: SHIPPED | OUTPATIENT
Start: 2021-04-17 | End: 2021-05-14 | Stop reason: SDUPTHER

## 2021-04-19 RX ORDER — ALBUTEROL SULFATE 2.5 MG/3ML
SOLUTION RESPIRATORY (INHALATION)
Qty: 150 ML | Refills: 3 | Status: SHIPPED | OUTPATIENT
Start: 2021-04-19 | End: 2021-11-08

## 2021-04-20 ENCOUNTER — OFFICE VISIT (OUTPATIENT)
Dept: CARDIOLOGY | Facility: CLINIC | Age: 66
End: 2021-04-20

## 2021-04-20 VITALS
HEART RATE: 74 BPM | WEIGHT: 141.8 LBS | HEIGHT: 67 IN | BODY MASS INDEX: 22.26 KG/M2 | SYSTOLIC BLOOD PRESSURE: 100 MMHG | DIASTOLIC BLOOD PRESSURE: 68 MMHG

## 2021-04-20 DIAGNOSIS — Z01.810 PREOP CARDIOVASCULAR EXAM: ICD-10-CM

## 2021-04-20 DIAGNOSIS — Z95.2 S/P MVR (MITRAL VALVE REPLACEMENT): Primary | ICD-10-CM

## 2021-04-20 DIAGNOSIS — Z98.890 S/P TVR (TRICUSPID VALVE REPAIR): ICD-10-CM

## 2021-04-20 PROCEDURE — 99214 OFFICE O/P EST MOD 30 MIN: CPT | Performed by: NURSE PRACTITIONER

## 2021-04-20 PROCEDURE — 93000 ELECTROCARDIOGRAM COMPLETE: CPT | Performed by: NURSE PRACTITIONER

## 2021-04-20 NOTE — PROGRESS NOTES
Date of Office Visit: 21  Encounter Provider: BA Charles  Place of Service: [facility] CARDIOLOGY  Patient Name: Paz Browne  :1955    Chief Complaint   Patient presents with   • S/P MVR (mitral valve replacement   • Follow-up   :     HPI: Paz Browne is a 65 y.o. female  with history of paroxysmal atrial flutter/fibrillation, status post tricuspid valve repair, status post mitral valve replacement, COPD, hypertension, pulmonary hypertension, and GI bleed.     She was hospitalized with progressive shortness of breath in 2014.  At that time she was found to be in rapid atrial fibrillation with some congestive heart failure.  Echocardiogram revealed normal left ventricular systolic function with severely dilated left atrium.  The right ventricle was moderately enlarged.  She had severe mitral and tricuspid insufficiency with severe pulmonary hypertension.  She was diuresed and rate controlled. CHIO confirmed severe mitral and severe tricuspid regurgitation.  She had cardiac catheterization which showed no significant coronary artery disease with severe mitral insufficiency.  She underwent urgent mitral valve repair using a #26 annuloplasty ring which was unsuccessful and had to be replaced with a #31 mm porcine [employer] Leopoldo prostheses.  She also had tricuspid valve repair and a right and left CryoMaze procedure with closure of the atrial appendage.  She did well postoperatively.  She has some transient will insufficiency and some junctional arrhythmia.  She was later found to be bradycardic and then went back into atrial fibrillation and was started on warfarin.  In 2014 she presented with tachycardia and shortness of breath and was in atrial flutter.  Her carvedilol was increased she was later started on amiodarone.  She underwent electrocardioversion in 2014.  In 2016 she had progressive dyspnea and was admitted to the hospital.   "Echocardiogram showed normal left ventricular systolic function and her mitral valve replacement and tricuspid valve repair appeared normal.  She had marked pulmonary hypertension and some diffuse T-wave inversion in the precordial leads.  ProBNP was mildly elevated.  Cardiac catheterization showed normal coronary arteries.  Right and left sided filling pressures were normal.  She had moderate pulmonary hypertension with normal wedge pressure.  She did not receive vasodilators and was later followed with pulmonary and initiated on oxygen therapy.  In August 2017 she had some GI bleeding.  She had a polypectomy also had some gastritis and hemorrhoids.  She was removed from warfarin and then kept off warfarin and she maintained sinus rhythm.  She was admitted in January 2018 with some right-sided pneumonia and viral infection and treated for that.      She presents for reevaluation.  She is in need of EGD/colonoscopy due to elevated iron and heme positive stool.  She is hard of hearing.  She is on 3 L of oxygen at rest and ambulates with 4 L of oxygen.  She still needs to get pulmonary clearance for scopes.  She has a reported chest pain every so often which is chronic and unchanged.  This does not happen weekly or even monthly.  She has some occasional palpitations at night.  She has chronic shortness of breath unchanged.  Has had no issues with wheezing recently.  She denies dizziness, lightheadedness, near-syncope, syncope, or edema.        Allergies   Allergen Reactions   • Bupropion Itching   • Cephalexin Itching     Tolerated piperacillin/tazobactam   • Metoprolol Itching           Family and social history reviewed.     ROS  All other systems were reviewed and are negative          Objective:     Vitals:    04/20/21 0815   BP: 100/68   BP Location: Left arm   Patient Position: Sitting   Pulse: 74   Weight: 64.3 kg (141 lb 12.8 oz)   Height: 170.2 cm (67\")     Body mass index is 22.21 kg/m².    PHYSICAL " EXAM:  Constitutional:       General: Not in acute distress.     Appearance: Well-developed. Not diaphoretic.   HENT:      Head: Normocephalic.   Pulmonary:      Effort: Pulmonary effort is normal. No respiratory distress.      Breath sounds: Normal breath sounds. No wheezing. No rhonchi. No rales.   Cardiovascular:      Normal rate. Regular rhythm.   Pulses:     Radial: 2+ bilaterally.  Skin:     General: Skin is warm and dry. There is no cyanosis.      Findings: No rash.   Neurological:      Mental Status: Alert and oriented to person, place, and time.   Psychiatric:         Behavior: Behavior normal.         Thought Content: Thought content normal.         Judgment: Judgment normal.           ECG 12 Lead    Date/Time: 4/20/2021 8:25 AM  Performed by: Melanie Sykes APRN  Authorized by: Melanie Sykes APRN   Comparison: compared with previous ECG   Similar to previous ECG  Rhythm: sinus rhythm  Rate: normal  T inversion: aVL                Current Outpatient Medications   Medication Sig Dispense Refill   • albuterol (PROVENTIL) (2.5 MG/3ML) 0.083% nebulizer solution INHALE 1 VIAL BY MOUTH EVERY 4 HOURS AS NEEDED FOR WHEEZING 150 mL 3   • albuterol sulfate  (90 Base) MCG/ACT inhaler Inhale 2 puffs Every 4 (Four) Hours As Needed for Wheezing. 18 g 3   • aspirin 81 MG EC tablet Take 1 tablet by mouth Daily.     • atorvastatin (LIPITOR) 40 MG tablet TAKE 1 TABLET BY MOUTH EVERY DAY 90 tablet 2   • benzonatate (Tessalon Perles) 100 MG capsule Take 1 capsule by mouth 2 (Two) Times a Day As Needed for Cough (2 p.o. twice daily as needed cough). 30 capsule 3   • carvedilol (COREG) 6.25 MG tablet TAKE 1 TABLET BY MOUTH TWICE A DAY WITH MEALS 180 tablet 2   • cyclobenzaprine (FLEXERIL) 10 MG tablet TAKE 1 TABLET BY MOUTH AT BEDTIME 90 tablet 3   • doxycycline (VIBRAMYICN) 100 MG tablet Take 1 tablet by mouth 2 (Two) Times a Day. 20 tablet 0   • fluconazole (DIFLUCAN) 150 MG tablet 1 p.o. as needed yeast day 1, 4, 7 3  tablet 0   • fluticasone-salmeterol (Advair Diskus) 250-50 MCG/DOSE DISKUS Inhale 1 puff 2 (Two) Times a Day. 3 each 3   • furosemide (LASIX) 20 MG tablet TAKE 1 TABLET BY MOUTH EVERY DAY 90 tablet 1   • furosemide (LASIX) 40 MG tablet TAKE 1 TABLET BY MOUTH TWICE A DAY (Patient taking differently: 40 mg Daily.) 180 tablet 0   • gabapentin (NEURONTIN) 600 MG tablet Take 1 tablet by mouth 3 (Three) Times a Day. Shingles pain 3 months 270 tablet 1   • HYDROcodone-acetaminophen (NORCO) 5-325 MG per tablet Take 1 tablet by mouth Every 8 (Eight) Hours As Needed for Moderate Pain . Chronic pain medicine for low back pain 90 tablet 0   • lisinopril (PRINIVIL,ZESTRIL) 40 MG tablet TAKE 1 TABLET BY MOUTH EVERY DAY 90 tablet 1   • magnesium oxide (MAG-OX) 400 MG tablet TAKE 1 TABLET BY MOUTH EVERY DAY 90 tablet 1   • meclizine (ANTIVERT) 25 MG tablet Take 1 tablet by mouth 3 (Three) Times a Day As Needed for Dizziness. prn 30 tablet 3   • Multiple Vitamins-Minerals (MULTIVITAL) tablet Take 1 tablet by mouth Daily.     • Nebulizer device 1 Units 4 (Four) Times a Day As Needed (Wheezing). J44.1 j20.8 1 each 0   • O2 (OXYGEN) Inhale 3 L/min Continuous.     • omeprazole (priLOSEC) 40 MG capsule TAKE 1 CAPSULE BY MOUTH EVERY DAY 90 capsule 1   • ondansetron (ZOFRAN) 4 MG tablet Take 4 mg by mouth Every 12 (Twelve) Hours As Needed for Nausea or Vomiting.     • polyethylene glycol (MIRALAX) packet Take 17 g by mouth 2 (Two) Times a Day. (Patient taking differently: Take 17 g by mouth 2 (Two) Times a Day As Needed.)     • potassium chloride (KLOR-CON) 20 MEQ CR tablet TAKE 1 TABLET BY MOUTH THREE TIMES A  tablet 1   • roflumilast (DALIRESP) 500 MCG tablet tablet Take 1 tablet by mouth Daily. 90 tablet 1   • rOPINIRole (REQUIP) 2 MG tablet TAKE 1 TABLET BY MOUTH EVERY DAY EVERY NIGHT 90 tablet 2   • sertraline (ZOLOFT) 100 MG tablet TAKE 1 TABLET BY MOUTH EVERY DAY 90 tablet 1   • tiotropium (Spiriva HandiHaler) 18 MCG per  inhalation capsule Place 1 capsule into inhaler and inhale Daily. 90 capsule 3   • valACYclovir (Valtrex) 1000 MG tablet 1 p.o. 3 times daily x1 week 21 tablet 0   • zaleplon (SONATA) 10 MG capsule TAKE 1 CAPSULE BY MOUTH EVERY DAY AT NIGHT 30 capsule 3     No current facility-administered medications for this visit.     Assessment:       Diagnosis Plan   1. S/P MVR (mitral valve replacement)  Adult Transthoracic Echo Complete W/ Cont if Necessary Per Protocol    ECG 12 Lead   2. S/P TVR (tricuspid valve repair)  Adult Transthoracic Echo Complete W/ Cont if Necessary Per Protocol    ECG 12 Lead   3. Preop cardiovascular exam  Adult Transthoracic Echo Complete W/ Cont if Necessary Per Protocol        Orders Placed This Encounter   Procedures   • ECG 12 Lead     This order was created via procedure documentation     Order Specific Question:   Release to patient     Answer:   Immediate   • Adult Transthoracic Echo Complete W/ Cont if Necessary Per Protocol     Standing Status:   Future     Standing Expiration Date:   4/20/2022     Order Specific Question:   Reason for exam?     Answer:   Valvular Function     Order Specific Question:   Valvular Function specification?     Answer:   Prosthetic Valve         Plan:     1.  65 year old female with history of severe mitral insufficiency status post mitral valve replacement with a tissue valve and tricuspid valve repair August 2014.  She has normal left ventricular systolic function last echocardiogram June 2016  - she is to return for echo  2.  Hypertension blood pressure if anything on the low side  3. History of paroxysmal atrial fibrillation status post CRYO MAZE 08/2014 recurrent atrial flutter/fibrillation status post cardioversion October 2014.  She was on warfarin and then maintained normal sinus rhythm so it was stopped due to GI Bleeding  4.COPD with chronic hypoxemia on oxygen therapy follows with Dr. Melo  5.   History of lung infection  6.  Hyperlipidemia on  target dose atorvastatin continue the same  7.  Pulmonary hypertension she follows with pulmonology with Dr. Anatoliy Melo  8. Renal insufficiency- creatinine 1.23, GFR 44  In  03/2021  9. History of GI bleeding- need for bidirectional scope, see below  10. History of Vertigo  11 . Preoperative risk assessment -she is at acceptable risk to undergo bidirectional scope.  If needed may hold aspirin 3-5 days prior.    Follow-up in 1 year call with questions or concerns.  I will have her meet with Dr. Randall when she returns            It has been a pleasure to participate in this patient's care.      Thank you,  BA Charles      **I used Dragon to dictate this note:**

## 2021-04-26 ENCOUNTER — TRANSCRIBE ORDERS (OUTPATIENT)
Dept: ADMINISTRATIVE | Facility: HOSPITAL | Age: 66
End: 2021-04-26

## 2021-04-26 DIAGNOSIS — Z12.2 ENCOUNTER FOR SCREENING FOR LUNG CANCER: Primary | ICD-10-CM

## 2021-04-27 RX ORDER — OMEPRAZOLE 40 MG/1
CAPSULE, DELAYED RELEASE ORAL
Qty: 90 CAPSULE | Refills: 1 | Status: SHIPPED | OUTPATIENT
Start: 2021-04-27 | End: 2021-09-23

## 2021-04-30 ENCOUNTER — HOSPITAL ENCOUNTER (OUTPATIENT)
Dept: CARDIOLOGY | Facility: HOSPITAL | Age: 66
Discharge: HOME | End: 2021-04-30
Admitting: NURSE PRACTITIONER

## 2021-04-30 ENCOUNTER — TELEPHONE (OUTPATIENT)
Dept: CARDIOLOGY | Facility: CLINIC | Age: 66
End: 2021-04-30

## 2021-04-30 ENCOUNTER — TELEPHONE (OUTPATIENT)
Dept: GASTROENTEROLOGY | Facility: CLINIC | Age: 66
End: 2021-04-30

## 2021-04-30 VITALS
WEIGHT: 141.76 LBS | BODY MASS INDEX: 22.25 KG/M2 | HEIGHT: 67 IN | HEART RATE: 79 BPM | SYSTOLIC BLOOD PRESSURE: 157 MMHG | DIASTOLIC BLOOD PRESSURE: 77 MMHG

## 2021-04-30 DIAGNOSIS — Z95.2 S/P MVR (MITRAL VALVE REPLACEMENT): ICD-10-CM

## 2021-04-30 DIAGNOSIS — Z01.810 PREOP CARDIOVASCULAR EXAM: ICD-10-CM

## 2021-04-30 DIAGNOSIS — Z98.890 S/P TVR (TRICUSPID VALVE REPAIR): ICD-10-CM

## 2021-04-30 PROCEDURE — 93306 TTE W/DOPPLER COMPLETE: CPT | Performed by: INTERNAL MEDICINE

## 2021-04-30 PROCEDURE — 93306 TTE W/DOPPLER COMPLETE: CPT

## 2021-04-30 NOTE — TELEPHONE ENCOUNTER
Called no answer. Left voicemail. Echo shows normal EF and prosthetic mitral valve with gradients in well defined limits. Small pericardial effusion noted.       Cleared to proceed with scheduled scope with Dr. Devine.

## 2021-04-30 NOTE — TELEPHONE ENCOUNTER
Call to pt.  States had old message on answering machine.   Advise no calls out to pt.  Verb understanding.

## 2021-04-30 NOTE — TELEPHONE ENCOUNTER
----- Message from Harrison Cabrera sent at 4/30/2021 12:07 PM EDT -----  Regarding: Call back  Contact: [phone]  PT is returning a phone call

## 2021-05-03 ENCOUNTER — TRANSCRIBE ORDERS (OUTPATIENT)
Dept: ADMINISTRATIVE | Facility: HOSPITAL | Age: 66
End: 2021-05-03

## 2021-05-03 DIAGNOSIS — Z01.818 OTHER SPECIFIED PRE-OPERATIVE EXAMINATION: Primary | ICD-10-CM

## 2021-05-03 LAB
ASCENDING AORTA: 2.6 CM
BH CV ECHO MEAS - ACS: 1.6 CM
BH CV ECHO MEAS - AO MAX PG (FULL): 2.9 MMHG
BH CV ECHO MEAS - AO MAX PG: 9.2 MMHG
BH CV ECHO MEAS - AO MEAN PG (FULL): 0 MMHG
BH CV ECHO MEAS - AO MEAN PG: 4 MMHG
BH CV ECHO MEAS - AO ROOT AREA (BSA CORRECTED): 1.3
BH CV ECHO MEAS - AO ROOT AREA: 4.2 CM^2
BH CV ECHO MEAS - AO ROOT DIAM: 2.3 CM
BH CV ECHO MEAS - AO V2 MAX: 152 CM/SEC
BH CV ECHO MEAS - AO V2 MEAN: 87.2 CM/SEC
BH CV ECHO MEAS - AO V2 VTI: 34 CM
BH CV ECHO MEAS - ASC AORTA: 2.6 CM
BH CV ECHO MEAS - AVA(I,A): 1.8 CM^2
BH CV ECHO MEAS - AVA(I,D): 1.8 CM^2
BH CV ECHO MEAS - AVA(V,A): 1.9 CM^2
BH CV ECHO MEAS - AVA(V,D): 1.9 CM^2
BH CV ECHO MEAS - BSA(HAYCOCK): 1.7 M^2
BH CV ECHO MEAS - BSA: 1.7 M^2
BH CV ECHO MEAS - BZI_BMI: 22.1 KILOGRAMS/M^2
BH CV ECHO MEAS - BZI_METRIC_HEIGHT: 170.2 CM
BH CV ECHO MEAS - BZI_METRIC_WEIGHT: 64 KG
BH CV ECHO MEAS - EDV(MOD-SP2): 38 ML
BH CV ECHO MEAS - EDV(MOD-SP4): 35 ML
BH CV ECHO MEAS - EDV(TEICH): 87.7 ML
BH CV ECHO MEAS - EF(CUBED): 61.5 %
BH CV ECHO MEAS - EF(MOD-BP): 60 %
BH CV ECHO MEAS - EF(MOD-SP2): 55.3 %
BH CV ECHO MEAS - EF(MOD-SP4): 60 %
BH CV ECHO MEAS - EF(TEICH): 53.3 %
BH CV ECHO MEAS - ESV(MOD-SP2): 17 ML
BH CV ECHO MEAS - ESV(MOD-SP4): 14 ML
BH CV ECHO MEAS - ESV(TEICH): 41 ML
BH CV ECHO MEAS - FS: 27.3 %
BH CV ECHO MEAS - IVS/LVPW: 0.7
BH CV ECHO MEAS - IVSD: 0.7 CM
BH CV ECHO MEAS - LAT PEAK E' VEL: 13.1 CM/SEC
BH CV ECHO MEAS - LV DIASTOLIC VOL/BSA (35-75): 20.1 ML/M^2
BH CV ECHO MEAS - LV MASS(C)D: 118.6 GRAMS
BH CV ECHO MEAS - LV MASS(C)DI: 68 GRAMS/M^2
BH CV ECHO MEAS - LV MAX PG: 6.4 MMHG
BH CV ECHO MEAS - LV MEAN PG: 4 MMHG
BH CV ECHO MEAS - LV SYSTOLIC VOL/BSA (12-30): 8 ML/M^2
BH CV ECHO MEAS - LV V1 MAX: 126 CM/SEC
BH CV ECHO MEAS - LV V1 MEAN: 91.1 CM/SEC
BH CV ECHO MEAS - LV V1 VTI: 27.2 CM
BH CV ECHO MEAS - LVIDD: 4.4 CM
BH CV ECHO MEAS - LVIDS: 3.2 CM
BH CV ECHO MEAS - LVLD AP2: 6.6 CM
BH CV ECHO MEAS - LVLD AP4: 5.6 CM
BH CV ECHO MEAS - LVLS AP2: 5.3 CM
BH CV ECHO MEAS - LVLS AP4: 4.9 CM
BH CV ECHO MEAS - LVOT AREA (M): 2.3 CM^2
BH CV ECHO MEAS - LVOT AREA: 2.3 CM^2
BH CV ECHO MEAS - LVOT DIAM: 1.7 CM
BH CV ECHO MEAS - LVPWD: 1 CM
BH CV ECHO MEAS - MED PEAK E' VEL: 8.2 CM/SEC
BH CV ECHO MEAS - MV DEC SLOPE: 766 CM/SEC^2
BH CV ECHO MEAS - MV DEC TIME: 0.26 SEC
BH CV ECHO MEAS - MV E MAX VEL: 171 CM/SEC
BH CV ECHO MEAS - MV MAX PG: 14.3 MMHG
BH CV ECHO MEAS - MV MEAN PG: 5 MMHG
BH CV ECHO MEAS - MV P1/2T MAX VEL: 204 CM/SEC
BH CV ECHO MEAS - MV P1/2T: 78 MSEC
BH CV ECHO MEAS - MV V2 MAX: 189 CM/SEC
BH CV ECHO MEAS - MV V2 MEAN: 92.7 CM/SEC
BH CV ECHO MEAS - MV V2 VTI: 46 CM
BH CV ECHO MEAS - MVA P1/2T LCG: 1.1 CM^2
BH CV ECHO MEAS - MVA(P1/2T): 2.8 CM^2
BH CV ECHO MEAS - MVA(VTI): 1.3 CM^2
BH CV ECHO MEAS - PA ACC TIME: 0.11 SEC
BH CV ECHO MEAS - PA MAX PG (FULL): 3.8 MMHG
BH CV ECHO MEAS - PA MAX PG: 5.3 MMHG
BH CV ECHO MEAS - PA PR(ACCEL): 27.7 MMHG
BH CV ECHO MEAS - PA V2 MAX: 115 CM/SEC
BH CV ECHO MEAS - PVA(V,A): 2 CM^2
BH CV ECHO MEAS - PVA(V,D): 2 CM^2
BH CV ECHO MEAS - QP/QS: 0.83
BH CV ECHO MEAS - RAP SYSTOLE: 3 MMHG
BH CV ECHO MEAS - RV MAX PG: 1.5 MMHG
BH CV ECHO MEAS - RV MEAN PG: 1 MMHG
BH CV ECHO MEAS - RV V1 MAX: 61.4 CM/SEC
BH CV ECHO MEAS - RV V1 MEAN: 44.5 CM/SEC
BH CV ECHO MEAS - RV V1 VTI: 13.5 CM
BH CV ECHO MEAS - RVOT AREA: 3.8 CM^2
BH CV ECHO MEAS - RVOT DIAM: 2.2 CM
BH CV ECHO MEAS - RVSP: 30 MMHG
BH CV ECHO MEAS - SI(AO): 81 ML/M^2
BH CV ECHO MEAS - SI(CUBED): 30.1 ML/M^2
BH CV ECHO MEAS - SI(LVOT): 35.4 ML/M^2
BH CV ECHO MEAS - SI(MOD-SP2): 12 ML/M^2
BH CV ECHO MEAS - SI(MOD-SP4): 12 ML/M^2
BH CV ECHO MEAS - SI(TEICH): 26.8 ML/M^2
BH CV ECHO MEAS - SUP REN AO DIAM: 2 CM
BH CV ECHO MEAS - SV(AO): 141.3 ML
BH CV ECHO MEAS - SV(CUBED): 52.4 ML
BH CV ECHO MEAS - SV(LVOT): 61.7 ML
BH CV ECHO MEAS - SV(MOD-SP2): 21 ML
BH CV ECHO MEAS - SV(MOD-SP4): 21 ML
BH CV ECHO MEAS - SV(RVOT): 51.3 ML
BH CV ECHO MEAS - SV(TEICH): 46.7 ML
BH CV ECHO MEAS - TAPSE (>1.6): 1.3 CM
BH CV ECHO MEAS - TR MAX VEL: 334 CM/SEC
BH CV ECHO MEASUREMENTS AVERAGE E/E' RATIO: 16.06
BH CV XLRA - RV BASE: 2.8 CM
BH CV XLRA - RV LENGTH: 5.5 CM
BH CV XLRA - RV MID: 1.9 CM
BH CV XLRA - TDI S': 8.5 CM/SEC
LEFT ATRIUM VOLUME INDEX: 27 ML/M2
MAXIMAL PREDICTED HEART RATE: 153 BPM
SINUS: 2.2 CM
STJ: 2 CM
STRESS TARGET HR: 130 BPM

## 2021-05-10 ENCOUNTER — APPOINTMENT (OUTPATIENT)
Dept: CT IMAGING | Facility: HOSPITAL | Age: 66
End: 2021-05-10

## 2021-05-10 ENCOUNTER — HOSPITAL ENCOUNTER (OUTPATIENT)
Facility: HOSPITAL | Age: 66
Setting detail: OBSERVATION
Discharge: HOME | End: 2021-05-12
Attending: EMERGENCY MEDICINE | Admitting: INTERNAL MEDICINE

## 2021-05-10 DIAGNOSIS — E87.5 ACUTE HYPERKALEMIA: Primary | ICD-10-CM

## 2021-05-10 PROBLEM — D64.9 ANEMIA: Status: ACTIVE | Noted: 2021-05-10

## 2021-05-10 PROBLEM — R25.1 TREMOR: Status: ACTIVE | Noted: 2021-05-10

## 2021-05-10 PROBLEM — J44.9 COPD (CHRONIC OBSTRUCTIVE PULMONARY DISEASE): Status: ACTIVE | Noted: 2019-06-30

## 2021-05-10 LAB
ALBUMIN SERPL-MCNC: 4.4 G/DL (ref 3.5–5.2)
ALBUMIN/GLOB SERPL: 1.8 G/DL
ALP SERPL-CCNC: 63 U/L (ref 39–117)
ALT SERPL W P-5'-P-CCNC: 17 U/L (ref 1–33)
ANION GAP SERPL CALCULATED.3IONS-SCNC: 6.2 MMOL/L (ref 5–15)
ANION GAP SERPL CALCULATED.3IONS-SCNC: 8.1 MMOL/L (ref 5–15)
ANION GAP SERPL CALCULATED.3IONS-SCNC: 9 MMOL/L (ref 5–15)
AST SERPL-CCNC: 18 U/L (ref 1–32)
BASOPHILS # BLD AUTO: 0.03 10*3/MM3 (ref 0–0.2)
BASOPHILS NFR BLD AUTO: 0.3 % (ref 0–1.5)
BILIRUB SERPL-MCNC: 0.3 MG/DL (ref 0–1.2)
BUN SERPL-MCNC: 17 MG/DL (ref 8–23)
BUN SERPL-MCNC: 18 MG/DL (ref 8–23)
BUN SERPL-MCNC: 19 MG/DL (ref 8–23)
BUN/CREAT SERPL: 13.2 (ref 7–25)
BUN/CREAT SERPL: 14.5 (ref 7–25)
BUN/CREAT SERPL: 15.8 (ref 7–25)
CALCIUM SPEC-SCNC: 8.6 MG/DL (ref 8.6–10.5)
CALCIUM SPEC-SCNC: 9.1 MG/DL (ref 8.6–10.5)
CALCIUM SPEC-SCNC: 9.3 MG/DL (ref 8.6–10.5)
CHLORIDE SERPL-SCNC: 105 MMOL/L (ref 98–107)
CHLORIDE SERPL-SCNC: 108 MMOL/L (ref 98–107)
CHLORIDE SERPL-SCNC: 108 MMOL/L (ref 98–107)
CO2 SERPL-SCNC: 22.9 MMOL/L (ref 22–29)
CO2 SERPL-SCNC: 25 MMOL/L (ref 22–29)
CO2 SERPL-SCNC: 25.8 MMOL/L (ref 22–29)
CREAT SERPL-MCNC: 1.14 MG/DL (ref 0.57–1)
CREAT SERPL-MCNC: 1.17 MG/DL (ref 0.57–1)
CREAT SERPL-MCNC: 1.44 MG/DL (ref 0.57–1)
DEPRECATED RDW RBC AUTO: 42.6 FL (ref 37–54)
EOSINOPHIL # BLD AUTO: 0.13 10*3/MM3 (ref 0–0.4)
EOSINOPHIL NFR BLD AUTO: 1.5 % (ref 0.3–6.2)
ERYTHROCYTE [DISTWIDTH] IN BLOOD BY AUTOMATED COUNT: 12.4 % (ref 12.3–15.4)
ETHANOL BLD-MCNC: <10 MG/DL (ref 0–10)
ETHANOL UR QL: <0.01 %
GFR SERPL CREATININE-BSD FRML MDRD: 36 ML/MIN/1.73
GFR SERPL CREATININE-BSD FRML MDRD: 46 ML/MIN/1.73
GFR SERPL CREATININE-BSD FRML MDRD: 48 ML/MIN/1.73
GLOBULIN UR ELPH-MCNC: 2.5 GM/DL
GLUCOSE BLDC GLUCOMTR-MCNC: 111 MG/DL (ref 70–130)
GLUCOSE BLDC GLUCOMTR-MCNC: 276 MG/DL (ref 70–130)
GLUCOSE BLDC GLUCOMTR-MCNC: 35 MG/DL (ref 70–130)
GLUCOSE SERPL-MCNC: 115 MG/DL (ref 65–99)
GLUCOSE SERPL-MCNC: 96 MG/DL (ref 65–99)
GLUCOSE SERPL-MCNC: 99 MG/DL (ref 65–99)
HCT VFR BLD AUTO: 34.5 % (ref 34–46.6)
HGB BLD-MCNC: 11.1 G/DL (ref 12–15.9)
HOLD SPECIMEN: NORMAL
HOLD SPECIMEN: NORMAL
IMM GRANULOCYTES # BLD AUTO: 0.03 10*3/MM3 (ref 0–0.05)
IMM GRANULOCYTES NFR BLD AUTO: 0.3 % (ref 0–0.5)
LYMPHOCYTES # BLD AUTO: 2.53 10*3/MM3 (ref 0.7–3.1)
LYMPHOCYTES NFR BLD AUTO: 29 % (ref 19.6–45.3)
MCH RBC QN AUTO: 30.9 PG (ref 26.6–33)
MCHC RBC AUTO-ENTMCNC: 32.2 G/DL (ref 31.5–35.7)
MCV RBC AUTO: 96.1 FL (ref 79–97)
MONOCYTES # BLD AUTO: 0.38 10*3/MM3 (ref 0.1–0.9)
MONOCYTES NFR BLD AUTO: 4.4 % (ref 5–12)
NEUTROPHILS NFR BLD AUTO: 5.63 10*3/MM3 (ref 1.7–7)
NEUTROPHILS NFR BLD AUTO: 64.5 % (ref 42.7–76)
NRBC BLD AUTO-RTO: 0 /100 WBC (ref 0–0.2)
PLATELET # BLD AUTO: 228 10*3/MM3 (ref 140–450)
PMV BLD AUTO: 11.1 FL (ref 6–12)
POTASSIUM SERPL-SCNC: 5.5 MMOL/L (ref 3.5–5.2)
POTASSIUM SERPL-SCNC: 6 MMOL/L (ref 3.5–5.2)
POTASSIUM SERPL-SCNC: 6.2 MMOL/L (ref 3.5–5.2)
PROT SERPL-MCNC: 6.9 G/DL (ref 6–8.5)
QT INTERVAL: 357 MS
RBC # BLD AUTO: 3.59 10*6/MM3 (ref 3.77–5.28)
SODIUM SERPL-SCNC: 139 MMOL/L (ref 136–145)
SODIUM SERPL-SCNC: 139 MMOL/L (ref 136–145)
SODIUM SERPL-SCNC: 140 MMOL/L (ref 136–145)
WBC # BLD AUTO: 8.73 10*3/MM3 (ref 3.4–10.8)
WHOLE BLOOD HOLD SPECIMEN: NORMAL
WHOLE BLOOD HOLD SPECIMEN: NORMAL

## 2021-05-10 PROCEDURE — U0004 COV-19 TEST NON-CDC HGH THRU: HCPCS | Performed by: EMERGENCY MEDICINE

## 2021-05-10 PROCEDURE — 93010 ELECTROCARDIOGRAM REPORT: CPT | Performed by: INTERNAL MEDICINE

## 2021-05-10 PROCEDURE — 70450 CT HEAD/BRAIN W/O DYE: CPT

## 2021-05-10 PROCEDURE — 96376 TX/PRO/DX INJ SAME DRUG ADON: CPT

## 2021-05-10 PROCEDURE — 82962 GLUCOSE BLOOD TEST: CPT

## 2021-05-10 PROCEDURE — 63710000001 INSULIN REGULAR HUMAN PER 5 UNITS: Performed by: EMERGENCY MEDICINE

## 2021-05-10 PROCEDURE — 96375 TX/PRO/DX INJ NEW DRUG ADDON: CPT

## 2021-05-10 PROCEDURE — C9803 HOPD COVID-19 SPEC COLLECT: HCPCS

## 2021-05-10 PROCEDURE — 96361 HYDRATE IV INFUSION ADD-ON: CPT

## 2021-05-10 PROCEDURE — 99285 EMERGENCY DEPT VISIT HI MDM: CPT

## 2021-05-10 PROCEDURE — G0378 HOSPITAL OBSERVATION PER HR: HCPCS

## 2021-05-10 PROCEDURE — 80053 COMPREHEN METABOLIC PANEL: CPT | Performed by: EMERGENCY MEDICINE

## 2021-05-10 PROCEDURE — 93005 ELECTROCARDIOGRAM TRACING: CPT | Performed by: EMERGENCY MEDICINE

## 2021-05-10 PROCEDURE — 25010000002 CALCIUM GLUCONATE-NACL 1-0.675 GM/50ML-% SOLUTION: Performed by: EMERGENCY MEDICINE

## 2021-05-10 PROCEDURE — 85025 COMPLETE CBC W/AUTO DIFF WBC: CPT | Performed by: EMERGENCY MEDICINE

## 2021-05-10 PROCEDURE — 36415 COLL VENOUS BLD VENIPUNCTURE: CPT | Performed by: NURSE PRACTITIONER

## 2021-05-10 PROCEDURE — 96374 THER/PROPH/DIAG INJ IV PUSH: CPT

## 2021-05-10 PROCEDURE — 82077 ASSAY SPEC XCP UR&BREATH IA: CPT | Performed by: EMERGENCY MEDICINE

## 2021-05-10 RX ORDER — ACETAMINOPHEN 160 MG/5ML
650 SOLUTION ORAL EVERY 4 HOURS PRN
Status: DISCONTINUED | OUTPATIENT
Start: 2021-05-10 | End: 2021-05-12 | Stop reason: HOSPADM

## 2021-05-10 RX ORDER — CARVEDILOL 6.25 MG/1
6.25 TABLET ORAL 2 TIMES DAILY WITH MEALS
Status: DISCONTINUED | OUTPATIENT
Start: 2021-05-11 | End: 2021-05-12 | Stop reason: HOSPADM

## 2021-05-10 RX ORDER — PANTOPRAZOLE SODIUM 40 MG/1
40 TABLET, DELAYED RELEASE ORAL EVERY MORNING
Refills: 1 | Status: DISCONTINUED | OUTPATIENT
Start: 2021-05-11 | End: 2021-05-12 | Stop reason: HOSPADM

## 2021-05-10 RX ORDER — FUROSEMIDE 10 MG/ML
40 INJECTION INTRAMUSCULAR; INTRAVENOUS ONCE
Status: DISCONTINUED | OUTPATIENT
Start: 2021-05-10 | End: 2021-05-10

## 2021-05-10 RX ORDER — SODIUM CHLORIDE, SODIUM LACTATE, POTASSIUM CHLORIDE, CALCIUM CHLORIDE 600; 310; 30; 20 MG/100ML; MG/100ML; MG/100ML; MG/100ML
100 INJECTION, SOLUTION INTRAVENOUS CONTINUOUS
Status: DISCONTINUED | OUTPATIENT
Start: 2021-05-10 | End: 2021-05-11

## 2021-05-10 RX ORDER — NITROGLYCERIN 0.4 MG/1
0.4 TABLET SUBLINGUAL
Status: DISCONTINUED | OUTPATIENT
Start: 2021-05-10 | End: 2021-05-12 | Stop reason: HOSPADM

## 2021-05-10 RX ORDER — FUROSEMIDE 10 MG/ML
40 INJECTION INTRAMUSCULAR; INTRAVENOUS ONCE
Status: COMPLETED | OUTPATIENT
Start: 2021-05-11 | End: 2021-05-11

## 2021-05-10 RX ORDER — HYDROCODONE BITARTRATE AND ACETAMINOPHEN 5; 325 MG/1; MG/1
1 TABLET ORAL ONCE AS NEEDED
Status: COMPLETED | OUTPATIENT
Start: 2021-05-10 | End: 2021-05-10

## 2021-05-10 RX ORDER — ASPIRIN 81 MG/1
81 TABLET ORAL DAILY
Status: DISCONTINUED | OUTPATIENT
Start: 2021-05-11 | End: 2021-05-12 | Stop reason: HOSPADM

## 2021-05-10 RX ORDER — ONDANSETRON 2 MG/ML
4 INJECTION INTRAMUSCULAR; INTRAVENOUS EVERY 6 HOURS PRN
Status: DISCONTINUED | OUTPATIENT
Start: 2021-05-10 | End: 2021-05-12 | Stop reason: HOSPADM

## 2021-05-10 RX ORDER — ROPINIROLE 2 MG/1
2 TABLET, FILM COATED ORAL NIGHTLY
Status: DISCONTINUED | OUTPATIENT
Start: 2021-05-11 | End: 2021-05-12 | Stop reason: HOSPADM

## 2021-05-10 RX ORDER — CYCLOBENZAPRINE HCL 10 MG
10 TABLET ORAL NIGHTLY
Status: DISCONTINUED | OUTPATIENT
Start: 2021-05-11 | End: 2021-05-12 | Stop reason: HOSPADM

## 2021-05-10 RX ORDER — MAGNESIUM OXIDE 400 MG/1
400 TABLET ORAL DAILY
Status: DISCONTINUED | OUTPATIENT
Start: 2021-05-11 | End: 2021-05-12 | Stop reason: HOSPADM

## 2021-05-10 RX ORDER — SODIUM CHLORIDE 9 MG/ML
100 INJECTION, SOLUTION INTRAVENOUS CONTINUOUS
Status: DISCONTINUED | OUTPATIENT
Start: 2021-05-10 | End: 2021-05-10

## 2021-05-10 RX ORDER — ROFLUMILAST 500 UG/1
500 TABLET ORAL DAILY
Status: DISCONTINUED | OUTPATIENT
Start: 2021-05-11 | End: 2021-05-12 | Stop reason: HOSPADM

## 2021-05-10 RX ORDER — FUROSEMIDE 40 MG/1
40 TABLET ORAL DAILY
Status: DISCONTINUED | OUTPATIENT
Start: 2021-05-11 | End: 2021-05-12 | Stop reason: HOSPADM

## 2021-05-10 RX ORDER — ACETAMINOPHEN 650 MG/1
650 SUPPOSITORY RECTAL EVERY 4 HOURS PRN
Status: DISCONTINUED | OUTPATIENT
Start: 2021-05-10 | End: 2021-05-12 | Stop reason: HOSPADM

## 2021-05-10 RX ORDER — SERTRALINE HYDROCHLORIDE 100 MG/1
100 TABLET, FILM COATED ORAL DAILY
Status: DISCONTINUED | OUTPATIENT
Start: 2021-05-11 | End: 2021-05-12 | Stop reason: HOSPADM

## 2021-05-10 RX ORDER — ZOLPIDEM TARTRATE 5 MG/1
5 TABLET ORAL NIGHTLY
Status: DISCONTINUED | OUTPATIENT
Start: 2021-05-11 | End: 2021-05-12 | Stop reason: HOSPADM

## 2021-05-10 RX ORDER — SODIUM CHLORIDE 0.9 % (FLUSH) 0.9 %
10 SYRINGE (ML) INJECTION EVERY 12 HOURS SCHEDULED
Status: DISCONTINUED | OUTPATIENT
Start: 2021-05-10 | End: 2021-05-12 | Stop reason: HOSPADM

## 2021-05-10 RX ORDER — ACETAMINOPHEN 325 MG/1
650 TABLET ORAL EVERY 4 HOURS PRN
Status: DISCONTINUED | OUTPATIENT
Start: 2021-05-10 | End: 2021-05-12 | Stop reason: HOSPADM

## 2021-05-10 RX ORDER — SODIUM CHLORIDE 0.9 % (FLUSH) 0.9 %
10 SYRINGE (ML) INJECTION AS NEEDED
Status: DISCONTINUED | OUTPATIENT
Start: 2021-05-10 | End: 2021-05-12 | Stop reason: HOSPADM

## 2021-05-10 RX ORDER — MECLIZINE HYDROCHLORIDE 25 MG/1
25 TABLET ORAL 3 TIMES DAILY PRN
Status: DISCONTINUED | OUTPATIENT
Start: 2021-05-10 | End: 2021-05-12 | Stop reason: HOSPADM

## 2021-05-10 RX ORDER — BUDESONIDE AND FORMOTEROL FUMARATE DIHYDRATE 160; 4.5 UG/1; UG/1
2 AEROSOL RESPIRATORY (INHALATION)
Refills: 3 | Status: DISCONTINUED | OUTPATIENT
Start: 2021-05-11 | End: 2021-05-12 | Stop reason: HOSPADM

## 2021-05-10 RX ORDER — LORATADINE 10 MG/1
10 TABLET ORAL 2 TIMES DAILY
COMMUNITY

## 2021-05-10 RX ORDER — DEXTROSE MONOHYDRATE 25 G/50ML
50 INJECTION, SOLUTION INTRAVENOUS ONCE
Status: COMPLETED | OUTPATIENT
Start: 2021-05-10 | End: 2021-05-10

## 2021-05-10 RX ORDER — ATORVASTATIN CALCIUM 20 MG/1
40 TABLET, FILM COATED ORAL DAILY
Status: DISCONTINUED | OUTPATIENT
Start: 2021-05-11 | End: 2021-05-12 | Stop reason: HOSPADM

## 2021-05-10 RX ORDER — POLYETHYLENE GLYCOL 3350 17 G/17G
17 POWDER, FOR SOLUTION ORAL 2 TIMES DAILY PRN
Status: DISCONTINUED | OUTPATIENT
Start: 2021-05-10 | End: 2021-05-12 | Stop reason: HOSPADM

## 2021-05-10 RX ORDER — CHLORAL HYDRATE 500 MG
CAPSULE ORAL NIGHTLY
COMMUNITY

## 2021-05-10 RX ORDER — DEXTROSE MONOHYDRATE 25 G/50ML
25 INJECTION, SOLUTION INTRAVENOUS ONCE
Status: COMPLETED | OUTPATIENT
Start: 2021-05-10 | End: 2021-05-10

## 2021-05-10 RX ORDER — HYDROCODONE BITARTRATE AND ACETAMINOPHEN 5; 325 MG/1; MG/1
1 TABLET ORAL EVERY 8 HOURS PRN
Status: DISCONTINUED | OUTPATIENT
Start: 2021-05-10 | End: 2021-05-12 | Stop reason: HOSPADM

## 2021-05-10 RX ORDER — ALBUTEROL SULFATE 2.5 MG/3ML
2.5 SOLUTION RESPIRATORY (INHALATION) EVERY 6 HOURS PRN
Status: DISCONTINUED | OUTPATIENT
Start: 2021-05-10 | End: 2021-05-12 | Stop reason: HOSPADM

## 2021-05-10 RX ORDER — CALCIUM GLUCONATE 20 MG/ML
1 INJECTION, SOLUTION INTRAVENOUS ONCE
Status: COMPLETED | OUTPATIENT
Start: 2021-05-10 | End: 2021-05-10

## 2021-05-10 RX ORDER — GUAIFENESIN 600 MG/1
600 TABLET, EXTENDED RELEASE ORAL 2 TIMES DAILY
COMMUNITY
End: 2022-10-26

## 2021-05-10 RX ADMIN — SODIUM CHLORIDE 1000 ML: 9 INJECTION, SOLUTION INTRAVENOUS at 18:58

## 2021-05-10 RX ADMIN — HYDROCODONE BITARTRATE AND ACETAMINOPHEN 1 TABLET: 5; 325 TABLET ORAL at 19:53

## 2021-05-10 RX ADMIN — SODIUM CHLORIDE, PRESERVATIVE FREE 10 ML: 5 INJECTION INTRAVENOUS at 22:26

## 2021-05-10 RX ADMIN — DEXTROSE MONOHYDRATE 50 ML: 25 INJECTION, SOLUTION INTRAVENOUS at 17:40

## 2021-05-10 RX ADMIN — SODIUM CHLORIDE, POTASSIUM CHLORIDE, SODIUM LACTATE AND CALCIUM CHLORIDE 100 ML/HR: 600; 310; 30; 20 INJECTION, SOLUTION INTRAVENOUS at 22:25

## 2021-05-10 RX ADMIN — CALCIUM GLUCONATE 1 G: 20 INJECTION, SOLUTION INTRAVENOUS at 17:43

## 2021-05-10 RX ADMIN — INSULIN HUMAN 10 UNITS: 100 INJECTION, SOLUTION PARENTERAL at 17:42

## 2021-05-10 RX ADMIN — DEXTROSE MONOHYDRATE 25 G: 25 INJECTION, SOLUTION INTRAVENOUS at 18:55

## 2021-05-11 ENCOUNTER — APPOINTMENT (OUTPATIENT)
Dept: ULTRASOUND IMAGING | Facility: HOSPITAL | Age: 66
End: 2021-05-11

## 2021-05-11 LAB
AMPHET+METHAMPHET UR QL: NEGATIVE
ANION GAP SERPL CALCULATED.3IONS-SCNC: 7.7 MMOL/L (ref 5–15)
BACTERIA UR QL AUTO: NORMAL /HPF
BARBITURATES UR QL SCN: NEGATIVE
BASOPHILS # BLD AUTO: 0.03 10*3/MM3 (ref 0–0.2)
BASOPHILS NFR BLD AUTO: 0.4 % (ref 0–1.5)
BENZODIAZ UR QL SCN: NEGATIVE
BILIRUB UR QL STRIP: NEGATIVE
BUN SERPL-MCNC: 17 MG/DL (ref 8–23)
BUN/CREAT SERPL: 15.7 (ref 7–25)
CALCIUM SPEC-SCNC: 8.8 MG/DL (ref 8.6–10.5)
CANNABINOIDS SERPL QL: NEGATIVE
CHLORIDE SERPL-SCNC: 104 MMOL/L (ref 98–107)
CLARITY UR: CLEAR
CO2 SERPL-SCNC: 27.3 MMOL/L (ref 22–29)
COCAINE UR QL: NEGATIVE
COLOR UR: YELLOW
CREAT SERPL-MCNC: 1.08 MG/DL (ref 0.57–1)
DEPRECATED RDW RBC AUTO: 42.6 FL (ref 37–54)
EOSINOPHIL # BLD AUTO: 0.12 10*3/MM3 (ref 0–0.4)
EOSINOPHIL NFR BLD AUTO: 1.7 % (ref 0.3–6.2)
ERYTHROCYTE [DISTWIDTH] IN BLOOD BY AUTOMATED COUNT: 12.6 % (ref 12.3–15.4)
GFR SERPL CREATININE-BSD FRML MDRD: 51 ML/MIN/1.73
GLUCOSE SERPL-MCNC: 111 MG/DL (ref 65–99)
GLUCOSE UR STRIP-MCNC: NEGATIVE MG/DL
HCT VFR BLD AUTO: 26.4 % (ref 34–46.6)
HGB BLD-MCNC: 8.8 G/DL (ref 12–15.9)
HGB UR QL STRIP.AUTO: NEGATIVE
HYALINE CASTS UR QL AUTO: NORMAL /LPF
IMM GRANULOCYTES # BLD AUTO: 0.02 10*3/MM3 (ref 0–0.05)
IMM GRANULOCYTES NFR BLD AUTO: 0.3 % (ref 0–0.5)
INR PPP: 0.97 (ref 0.9–1.1)
KETONES UR QL STRIP: NEGATIVE
LEUKOCYTE ESTERASE UR QL STRIP.AUTO: ABNORMAL
LYMPHOCYTES # BLD AUTO: 1.81 10*3/MM3 (ref 0.7–3.1)
LYMPHOCYTES NFR BLD AUTO: 26.1 % (ref 19.6–45.3)
MAGNESIUM SERPL-MCNC: 1.6 MG/DL (ref 1.6–2.4)
MCH RBC QN AUTO: 31 PG (ref 26.6–33)
MCHC RBC AUTO-ENTMCNC: 33.3 G/DL (ref 31.5–35.7)
MCV RBC AUTO: 93 FL (ref 79–97)
METHADONE UR QL SCN: NEGATIVE
MONOCYTES # BLD AUTO: 0.37 10*3/MM3 (ref 0.1–0.9)
MONOCYTES NFR BLD AUTO: 5.3 % (ref 5–12)
NEUTROPHILS NFR BLD AUTO: 4.58 10*3/MM3 (ref 1.7–7)
NEUTROPHILS NFR BLD AUTO: 66.2 % (ref 42.7–76)
NITRITE UR QL STRIP: NEGATIVE
NRBC BLD AUTO-RTO: 0 /100 WBC (ref 0–0.2)
OPIATES UR QL: NEGATIVE
OXYCODONE UR QL SCN: NEGATIVE
PH UR STRIP.AUTO: <=5 [PH] (ref 5–8)
PLATELET # BLD AUTO: 169 10*3/MM3 (ref 140–450)
PMV BLD AUTO: 10.4 FL (ref 6–12)
POTASSIUM SERPL-SCNC: 5 MMOL/L (ref 3.5–5.2)
PROT UR QL STRIP: NEGATIVE
PROTHROMBIN TIME: 12.7 SECONDS (ref 11.7–14.2)
QT INTERVAL: 374 MS
QT INTERVAL: 381 MS
RBC # BLD AUTO: 2.84 10*6/MM3 (ref 3.77–5.28)
RBC # UR: NORMAL /HPF
REF LAB TEST METHOD: NORMAL
SARS-COV-2 ORF1AB RESP QL NAA+PROBE: NOT DETECTED
SODIUM SERPL-SCNC: 139 MMOL/L (ref 136–145)
SP GR UR STRIP: <=1.005 (ref 1–1.03)
SQUAMOUS #/AREA URNS HPF: NORMAL /HPF
UROBILINOGEN UR QL STRIP: ABNORMAL
WBC # BLD AUTO: 6.93 10*3/MM3 (ref 3.4–10.8)
WBC UR QL AUTO: NORMAL /HPF

## 2021-05-11 PROCEDURE — 96375 TX/PRO/DX INJ NEW DRUG ADDON: CPT

## 2021-05-11 PROCEDURE — 25010000002 ONDANSETRON PER 1 MG: Performed by: NURSE PRACTITIONER

## 2021-05-11 PROCEDURE — 94799 UNLISTED PULMONARY SVC/PX: CPT

## 2021-05-11 PROCEDURE — 96376 TX/PRO/DX INJ SAME DRUG ADON: CPT

## 2021-05-11 PROCEDURE — 80307 DRUG TEST PRSMV CHEM ANLYZR: CPT | Performed by: EMERGENCY MEDICINE

## 2021-05-11 PROCEDURE — 85610 PROTHROMBIN TIME: CPT | Performed by: NURSE PRACTITIONER

## 2021-05-11 PROCEDURE — 93005 ELECTROCARDIOGRAM TRACING: CPT | Performed by: STUDENT IN AN ORGANIZED HEALTH CARE EDUCATION/TRAINING PROGRAM

## 2021-05-11 PROCEDURE — 85025 COMPLETE CBC W/AUTO DIFF WBC: CPT | Performed by: NURSE PRACTITIONER

## 2021-05-11 PROCEDURE — 25010000002 FUROSEMIDE PER 20 MG: Performed by: NURSE PRACTITIONER

## 2021-05-11 PROCEDURE — 93010 ELECTROCARDIOGRAM REPORT: CPT | Performed by: INTERNAL MEDICINE

## 2021-05-11 PROCEDURE — 94664 DEMO&/EVAL PT USE INHALER: CPT

## 2021-05-11 PROCEDURE — 81001 URINALYSIS AUTO W/SCOPE: CPT | Performed by: INTERNAL MEDICINE

## 2021-05-11 PROCEDURE — 76775 US EXAM ABDO BACK WALL LIM: CPT

## 2021-05-11 PROCEDURE — 96361 HYDRATE IV INFUSION ADD-ON: CPT

## 2021-05-11 PROCEDURE — 94640 AIRWAY INHALATION TREATMENT: CPT

## 2021-05-11 PROCEDURE — 83735 ASSAY OF MAGNESIUM: CPT | Performed by: NURSE PRACTITIONER

## 2021-05-11 PROCEDURE — G0378 HOSPITAL OBSERVATION PER HR: HCPCS

## 2021-05-11 PROCEDURE — 93005 ELECTROCARDIOGRAM TRACING: CPT | Performed by: INTERNAL MEDICINE

## 2021-05-11 PROCEDURE — 80048 BASIC METABOLIC PNL TOTAL CA: CPT | Performed by: STUDENT IN AN ORGANIZED HEALTH CARE EDUCATION/TRAINING PROGRAM

## 2021-05-11 RX ADMIN — CARVEDILOL 6.25 MG: 6.25 TABLET, FILM COATED ORAL at 18:15

## 2021-05-11 RX ADMIN — TIOTROPIUM BROMIDE INHALATION SPRAY 2 PUFF: 3.12 SPRAY, METERED RESPIRATORY (INHALATION) at 08:58

## 2021-05-11 RX ADMIN — MAGNESIUM OXIDE 400 MG (241.3 MG MAGNESIUM) TABLET 400 MG: TABLET at 09:43

## 2021-05-11 RX ADMIN — ASPIRIN 81 MG: 81 TABLET, COATED ORAL at 09:43

## 2021-05-11 RX ADMIN — CYCLOBENZAPRINE 10 MG: 10 TABLET, FILM COATED ORAL at 20:58

## 2021-05-11 RX ADMIN — HYDROCODONE BITARTRATE AND ACETAMINOPHEN 1 TABLET: 5; 325 TABLET ORAL at 14:33

## 2021-05-11 RX ADMIN — BUDESONIDE AND FORMOTEROL FUMARATE DIHYDRATE 2 PUFF: 160; 4.5 AEROSOL RESPIRATORY (INHALATION) at 21:04

## 2021-05-11 RX ADMIN — FUROSEMIDE 40 MG: 10 INJECTION, SOLUTION INTRAMUSCULAR; INTRAVENOUS at 00:02

## 2021-05-11 RX ADMIN — SODIUM CHLORIDE, PRESERVATIVE FREE 10 ML: 5 INJECTION INTRAVENOUS at 09:44

## 2021-05-11 RX ADMIN — BUDESONIDE AND FORMOTEROL FUMARATE DIHYDRATE 2 PUFF: 160; 4.5 AEROSOL RESPIRATORY (INHALATION) at 09:01

## 2021-05-11 RX ADMIN — ONDANSETRON 4 MG: 2 INJECTION INTRAMUSCULAR; INTRAVENOUS at 14:33

## 2021-05-11 RX ADMIN — ONDANSETRON 4 MG: 2 INJECTION INTRAMUSCULAR; INTRAVENOUS at 06:45

## 2021-05-11 RX ADMIN — HYDROCODONE BITARTRATE AND ACETAMINOPHEN 1 TABLET: 5; 325 TABLET ORAL at 06:45

## 2021-05-11 RX ADMIN — SODIUM CHLORIDE, PRESERVATIVE FREE 10 ML: 5 INJECTION INTRAVENOUS at 20:58

## 2021-05-11 RX ADMIN — FUROSEMIDE 40 MG: 40 TABLET ORAL at 09:43

## 2021-05-11 RX ADMIN — ONDANSETRON 4 MG: 2 INJECTION INTRAMUSCULAR; INTRAVENOUS at 20:58

## 2021-05-11 RX ADMIN — ROPINIROLE 2 MG: 2 TABLET, FILM COATED ORAL at 00:48

## 2021-05-11 RX ADMIN — PANTOPRAZOLE SODIUM 40 MG: 40 TABLET, DELAYED RELEASE ORAL at 06:39

## 2021-05-11 RX ADMIN — CARVEDILOL 6.25 MG: 6.25 TABLET, FILM COATED ORAL at 09:43

## 2021-05-11 RX ADMIN — ZOLPIDEM TARTRATE 5 MG: 5 TABLET ORAL at 00:02

## 2021-05-11 RX ADMIN — ROFLUMILAST 500 MCG: 500 TABLET ORAL at 09:43

## 2021-05-11 RX ADMIN — SODIUM CHLORIDE, POTASSIUM CHLORIDE, SODIUM LACTATE AND CALCIUM CHLORIDE 100 ML/HR: 600; 310; 30; 20 INJECTION, SOLUTION INTRAVENOUS at 09:44

## 2021-05-11 RX ADMIN — CYCLOBENZAPRINE 10 MG: 10 TABLET, FILM COATED ORAL at 00:48

## 2021-05-11 RX ADMIN — SERTRALINE 100 MG: 100 TABLET, FILM COATED ORAL at 09:43

## 2021-05-11 RX ADMIN — HYDROCODONE BITARTRATE AND ACETAMINOPHEN 1 TABLET: 5; 325 TABLET ORAL at 22:40

## 2021-05-11 RX ADMIN — ROPINIROLE 2 MG: 2 TABLET, FILM COATED ORAL at 21:53

## 2021-05-11 RX ADMIN — ATORVASTATIN CALCIUM 40 MG: 20 TABLET, FILM COATED ORAL at 09:43

## 2021-05-11 RX ADMIN — ZOLPIDEM TARTRATE 5 MG: 5 TABLET ORAL at 23:06

## 2021-05-12 ENCOUNTER — READMISSION MANAGEMENT (OUTPATIENT)
Dept: CALL CENTER | Facility: HOSPITAL | Age: 66
End: 2021-05-12

## 2021-05-12 VITALS
DIASTOLIC BLOOD PRESSURE: 45 MMHG | BODY MASS INDEX: 21.82 KG/M2 | HEART RATE: 73 BPM | WEIGHT: 139 LBS | SYSTOLIC BLOOD PRESSURE: 112 MMHG | RESPIRATION RATE: 18 BRPM | TEMPERATURE: 97.6 F | HEIGHT: 67 IN | OXYGEN SATURATION: 100 %

## 2021-05-12 PROBLEM — R25.1 TREMOR: Status: RESOLVED | Noted: 2021-05-10 | Resolved: 2021-05-12

## 2021-05-12 PROBLEM — E87.5 ACUTE HYPERKALEMIA: Status: RESOLVED | Noted: 2021-05-10 | Resolved: 2021-05-12

## 2021-05-12 LAB
ANION GAP SERPL CALCULATED.3IONS-SCNC: 7.7 MMOL/L (ref 5–15)
BUN SERPL-MCNC: 13 MG/DL (ref 8–23)
BUN/CREAT SERPL: 11.7 (ref 7–25)
CALCIUM SPEC-SCNC: 8.4 MG/DL (ref 8.6–10.5)
CHLORIDE SERPL-SCNC: 102 MMOL/L (ref 98–107)
CO2 SERPL-SCNC: 29.3 MMOL/L (ref 22–29)
CREAT SERPL-MCNC: 1.11 MG/DL (ref 0.57–1)
DEPRECATED RDW RBC AUTO: 42.3 FL (ref 37–54)
ERYTHROCYTE [DISTWIDTH] IN BLOOD BY AUTOMATED COUNT: 12.5 % (ref 12.3–15.4)
FERRITIN SERPL-MCNC: 554 NG/ML (ref 13–150)
FOLATE SERPL-MCNC: 15.8 NG/ML (ref 4.78–24.2)
GFR SERPL CREATININE-BSD FRML MDRD: 49 ML/MIN/1.73
GLUCOSE SERPL-MCNC: 96 MG/DL (ref 65–99)
HCT VFR BLD AUTO: 28.8 % (ref 34–46.6)
HGB BLD-MCNC: 9.6 G/DL (ref 12–15.9)
IRON 24H UR-MRATE: 62 MCG/DL (ref 37–145)
IRON SATN MFR SERPL: 24 % (ref 20–50)
MAGNESIUM SERPL-MCNC: 1.6 MG/DL (ref 1.6–2.4)
MCH RBC QN AUTO: 31.3 PG (ref 26.6–33)
MCHC RBC AUTO-ENTMCNC: 33.3 G/DL (ref 31.5–35.7)
MCV RBC AUTO: 93.8 FL (ref 79–97)
PLATELET # BLD AUTO: 176 10*3/MM3 (ref 140–450)
PMV BLD AUTO: 10.8 FL (ref 6–12)
POTASSIUM SERPL-SCNC: 4.5 MMOL/L (ref 3.5–5.2)
RBC # BLD AUTO: 3.07 10*6/MM3 (ref 3.77–5.28)
SODIUM SERPL-SCNC: 139 MMOL/L (ref 136–145)
TIBC SERPL-MCNC: 258 MCG/DL (ref 298–536)
TRANSFERRIN SERPL-MCNC: 173 MG/DL (ref 200–360)
VIT B12 BLD-MCNC: 991 PG/ML (ref 211–946)
WBC # BLD AUTO: 8.17 10*3/MM3 (ref 3.4–10.8)

## 2021-05-12 PROCEDURE — 99214 OFFICE O/P EST MOD 30 MIN: CPT | Performed by: NURSE PRACTITIONER

## 2021-05-12 PROCEDURE — 25010000002 ONDANSETRON PER 1 MG: Performed by: NURSE PRACTITIONER

## 2021-05-12 PROCEDURE — G0378 HOSPITAL OBSERVATION PER HR: HCPCS

## 2021-05-12 PROCEDURE — 83540 ASSAY OF IRON: CPT | Performed by: INTERNAL MEDICINE

## 2021-05-12 PROCEDURE — 82728 ASSAY OF FERRITIN: CPT | Performed by: INTERNAL MEDICINE

## 2021-05-12 PROCEDURE — 84466 ASSAY OF TRANSFERRIN: CPT | Performed by: INTERNAL MEDICINE

## 2021-05-12 PROCEDURE — 96376 TX/PRO/DX INJ SAME DRUG ADON: CPT

## 2021-05-12 PROCEDURE — 85027 COMPLETE CBC AUTOMATED: CPT | Performed by: INTERNAL MEDICINE

## 2021-05-12 PROCEDURE — 82607 VITAMIN B-12: CPT | Performed by: INTERNAL MEDICINE

## 2021-05-12 PROCEDURE — 94799 UNLISTED PULMONARY SVC/PX: CPT

## 2021-05-12 PROCEDURE — 83735 ASSAY OF MAGNESIUM: CPT | Performed by: INTERNAL MEDICINE

## 2021-05-12 PROCEDURE — 80048 BASIC METABOLIC PNL TOTAL CA: CPT | Performed by: INTERNAL MEDICINE

## 2021-05-12 PROCEDURE — 82746 ASSAY OF FOLIC ACID SERUM: CPT | Performed by: INTERNAL MEDICINE

## 2021-05-12 RX ORDER — ONDANSETRON 4 MG/1
4 TABLET, FILM COATED ORAL EVERY 12 HOURS PRN
Qty: 90 TABLET | Refills: 3 | Status: SHIPPED | OUTPATIENT
Start: 2021-05-12 | End: 2022-10-20 | Stop reason: SDUPTHER

## 2021-05-12 RX ORDER — FUROSEMIDE 40 MG/1
40 TABLET ORAL DAILY
Qty: 30 TABLET | Refills: 3 | Status: SHIPPED | OUTPATIENT
Start: 2021-05-12 | End: 2021-10-11 | Stop reason: SDUPTHER

## 2021-05-12 RX ADMIN — TIOTROPIUM BROMIDE INHALATION SPRAY 2 PUFF: 3.12 SPRAY, METERED RESPIRATORY (INHALATION) at 07:45

## 2021-05-12 RX ADMIN — ONDANSETRON 4 MG: 2 INJECTION INTRAMUSCULAR; INTRAVENOUS at 06:22

## 2021-05-12 RX ADMIN — BUDESONIDE AND FORMOTEROL FUMARATE DIHYDRATE 2 PUFF: 160; 4.5 AEROSOL RESPIRATORY (INHALATION) at 07:44

## 2021-05-12 RX ADMIN — CARVEDILOL 6.25 MG: 6.25 TABLET, FILM COATED ORAL at 08:33

## 2021-05-12 RX ADMIN — ATORVASTATIN CALCIUM 40 MG: 20 TABLET, FILM COATED ORAL at 08:33

## 2021-05-12 RX ADMIN — SERTRALINE 100 MG: 100 TABLET, FILM COATED ORAL at 08:33

## 2021-05-12 RX ADMIN — ASPIRIN 81 MG: 81 TABLET, COATED ORAL at 08:33

## 2021-05-12 RX ADMIN — ROFLUMILAST 500 MCG: 500 TABLET ORAL at 08:32

## 2021-05-12 RX ADMIN — PANTOPRAZOLE SODIUM 40 MG: 40 TABLET, DELAYED RELEASE ORAL at 05:51

## 2021-05-12 RX ADMIN — MAGNESIUM OXIDE 400 MG (241.3 MG MAGNESIUM) TABLET 400 MG: TABLET at 08:33

## 2021-05-12 RX ADMIN — FUROSEMIDE 40 MG: 40 TABLET ORAL at 08:33

## 2021-05-12 RX ADMIN — HYDROCODONE BITARTRATE AND ACETAMINOPHEN 1 TABLET: 5; 325 TABLET ORAL at 06:34

## 2021-05-12 NOTE — OUTREACH NOTE
Prep Survey      Responses   [facility] patient discharged from?  [place]   Is LACE score < 7 ?  Yes   Emergency Room discharge w/ pulse ox?  No   Eligibility  [facility]   Date of Admission  05/10/21   Date of Discharge  05/12/21   Discharge Disposition  Home or Self Care   Discharge diagnosis  Acute hyperkalemia   Does the patient have one of the following disease processes/diagnoses(primary or secondary)?  Other   Does the patient have Home health ordered?  No   Is there a DME ordered?  Yes   What DME was ordered?  home Nebulizer and O2 - [facility]   Prep survey completed?  Yes          Jeni Harris RN

## 2021-05-13 ENCOUNTER — TELEPHONE (OUTPATIENT)
Dept: FAMILY MEDICINE CLINIC | Facility: CLINIC | Age: 66
End: 2021-05-13

## 2021-05-13 ENCOUNTER — TRANSITIONAL CARE MANAGEMENT TELEPHONE ENCOUNTER (OUTPATIENT)
Dept: CALL CENTER | Facility: HOSPITAL | Age: 66
End: 2021-05-13

## 2021-05-13 DIAGNOSIS — E55.9 VITAMIN D DEFICIENCY, UNSPECIFIED: ICD-10-CM

## 2021-05-13 DIAGNOSIS — I10 ESSENTIAL HYPERTENSION: ICD-10-CM

## 2021-05-13 DIAGNOSIS — E87.6 HYPOKALEMIA: ICD-10-CM

## 2021-05-13 DIAGNOSIS — R79.89 ELEVATED FERRITIN: Primary | ICD-10-CM

## 2021-05-13 NOTE — OUTREACH NOTE
Call Center TCM Note      Responses   [facility] patient discharged from?  [place]   Does the patient have one of the following disease processes/diagnoses(primary or secondary)?  Other   TCM attempt successful?  Yes   Discharge diagnosis  Acute hyperkalemia   Meds reviewed with patient/caregiver?  Yes   Is the patient having any side effects they believe may be caused by any medication additions or changes?  No   Does the patient have all medications ordered at discharge?  Yes   Is the patient taking all medications as directed (includes completed medication regime)?  Yes   Does the patient have a primary care provider?   Yes   Does the patient have an appointment with their PCP within 7 days of discharge?  No   Comments regarding PCP  PCP Dr Martinez has no appts opening within TCM time frame. [facility] to review sched and call pt for TCM FWP to be completed by 05/26/2021   Has the patient kept scheduled appointments due by today?  Yes   Has home health visited the patient within 72 hours of discharge?  N/A   What DME was ordered?  home Nebulizer and O2 - [facility]   Has all DME been delivered?  Yes   Psychosocial issues?  No   Did the patient receive a copy of their discharge instructions?  Yes   Nursing interventions  Reviewed instructions with patient   What is the patient's perception of their health status since discharge?  Improving   Is the patient/caregiver able to teach back signs and symptoms related to disease process for when to call PCP?  Yes   Is the patient/caregiver able to teach back signs and symptoms related to disease process for when to call 911?  Yes   Is the patient/caregiver able to teach back the hierarchy of who to call/visit for symptoms/problems? PCP, Specialist, Home health nurse, Urgent Care, ED, 911  Yes   If the patient is a current smoker, are they able to teach back resources for cessation?  Not a smoker   TCM call completed?  Yes   Wrap up additional comments  Pt still  very tired, but no further episodes of tremors. Pt understands medication changes. No questions at this time. PCP Dr Martinez has no appts opening within TCM time frame. [facility] to review sched and call pt for TCM FWP to be completed by 05/26/2021          Chelo Multani MA    5/13/2021, 16:03 EDT

## 2021-05-13 NOTE — TELEPHONE ENCOUNTER
NEEDS LAB ORDER PUT IN CHART, FOLLOWING UP FROM HER VISIT. STOPPED KLOR- CON NEEDS POTASSIUM CHECKED.

## 2021-05-13 NOTE — OUTREACH NOTE
Call Center TCM Note      Responses   [facility] patient discharged from?  [place]   Does the patient have one of the following disease processes/diagnoses(primary or secondary)?  Other   TCM attempt successful?  No   Unsuccessful attempts  Attempt 1          Chelo Multani MA    5/13/2021, 12:26 EDT

## 2021-05-14 ENCOUNTER — LAB (OUTPATIENT)
Dept: LAB | Facility: HOSPITAL | Age: 66
End: 2021-05-14

## 2021-05-14 DIAGNOSIS — Z79.891 LONG TERM CURRENT USE OF OPIATE ANALGESIC: ICD-10-CM

## 2021-05-14 DIAGNOSIS — Z01.818 OTHER SPECIFIED PRE-OPERATIVE EXAMINATION: ICD-10-CM

## 2021-05-14 PROCEDURE — U0004 COV-19 TEST NON-CDC HGH THRU: HCPCS

## 2021-05-14 PROCEDURE — C9803 HOPD COVID-19 SPEC COLLECT: HCPCS

## 2021-05-14 RX ORDER — HYDROCODONE BITARTRATE AND ACETAMINOPHEN 5; 325 MG/1; MG/1
1 TABLET ORAL EVERY 8 HOURS PRN
Qty: 90 TABLET | Refills: 0 | Status: ON HOLD | OUTPATIENT
Start: 2021-05-14 | End: 2021-06-18 | Stop reason: SDUPTHER

## 2021-05-14 NOTE — TELEPHONE ENCOUNTER
Caller: Paz Browne    Relationship: Self    Best call back number: [phone]    Medication needed:   Requested Prescriptions     Pending Prescriptions Disp Refills   • HYDROcodone-acetaminophen (NORCO) 5-325 MG per tablet 90 tablet 0     Sig: Take 1 tablet by mouth Every 8 (Eight) Hours As Needed for Moderate Pain . Chronic pain medicine for low back pain       When do you need the refill by: ASAP    What additional details did the patient provide when requesting the medication:     PATIENT HAS ENOUGH TO GET HER BY.      Does the patient have less than a 3 day supply:  [] Yes  [x] No    What is the patient's preferred pharmacy: [facility]ACY #[id number] - [place], [place] - [address] [place] [address] - [phone] [facility] [phone] FX

## 2021-05-15 LAB — SARS-COV-2 ORF1AB RESP QL NAA+PROBE: NOT DETECTED

## 2021-05-17 ENCOUNTER — HOSPITAL ENCOUNTER (OUTPATIENT)
Facility: HOSPITAL | Age: 66
Setting detail: HOSPITAL OUTPATIENT SURGERY
Discharge: NURSING, REHAB OR LONG-TERM CARE | End: 2021-05-17
Attending: INTERNAL MEDICINE | Admitting: INTERNAL MEDICINE

## 2021-05-17 ENCOUNTER — ANESTHESIA (OUTPATIENT)
Dept: GASTROENTEROLOGY | Facility: HOSPITAL | Age: 66
End: 2021-05-17

## 2021-05-17 ENCOUNTER — ANESTHESIA EVENT (OUTPATIENT)
Dept: GASTROENTEROLOGY | Facility: HOSPITAL | Age: 66
End: 2021-05-17

## 2021-05-17 VITALS
HEART RATE: 98 BPM | SYSTOLIC BLOOD PRESSURE: 172 MMHG | HEIGHT: 67 IN | BODY MASS INDEX: 21.82 KG/M2 | DIASTOLIC BLOOD PRESSURE: 89 MMHG | TEMPERATURE: 98.8 F | WEIGHT: 139 LBS | OXYGEN SATURATION: 100 % | RESPIRATION RATE: 16 BRPM

## 2021-05-17 DIAGNOSIS — D50.9 IRON DEFICIENCY ANEMIA, UNSPECIFIED IRON DEFICIENCY ANEMIA TYPE: ICD-10-CM

## 2021-05-17 DIAGNOSIS — R19.5 DARK STOOLS: ICD-10-CM

## 2021-05-17 DIAGNOSIS — K21.9 GASTROESOPHAGEAL REFLUX DISEASE WITHOUT ESOPHAGITIS: ICD-10-CM

## 2021-05-17 DIAGNOSIS — D12.6 ADENOMATOUS POLYP OF COLON, UNSPECIFIED PART OF COLON: ICD-10-CM

## 2021-05-17 PROCEDURE — 88305 TISSUE EXAM BY PATHOLOGIST: CPT | Performed by: INTERNAL MEDICINE

## 2021-05-17 PROCEDURE — 45378 DIAGNOSTIC COLONOSCOPY: CPT | Performed by: INTERNAL MEDICINE

## 2021-05-17 PROCEDURE — 88312 SPECIAL STAINS GROUP 1: CPT | Performed by: INTERNAL MEDICINE

## 2021-05-17 PROCEDURE — 43239 EGD BIOPSY SINGLE/MULTIPLE: CPT | Performed by: INTERNAL MEDICINE

## 2021-05-17 PROCEDURE — 25010000002 PROPOFOL 10 MG/ML EMULSION: Performed by: ANESTHESIOLOGY

## 2021-05-17 RX ORDER — LIDOCAINE HYDROCHLORIDE 20 MG/ML
INJECTION, SOLUTION INFILTRATION; PERINEURAL AS NEEDED
Status: DISCONTINUED | OUTPATIENT
Start: 2021-05-17 | End: 2021-05-17 | Stop reason: SURG

## 2021-05-17 RX ORDER — SODIUM CHLORIDE 0.9 % (FLUSH) 0.9 %
10 SYRINGE (ML) INJECTION AS NEEDED
Status: DISCONTINUED | OUTPATIENT
Start: 2021-05-17 | End: 2021-05-17 | Stop reason: HOSPADM

## 2021-05-17 RX ORDER — SODIUM CHLORIDE, SODIUM LACTATE, POTASSIUM CHLORIDE, CALCIUM CHLORIDE 600; 310; 30; 20 MG/100ML; MG/100ML; MG/100ML; MG/100ML
30 INJECTION, SOLUTION INTRAVENOUS CONTINUOUS PRN
Status: DISCONTINUED | OUTPATIENT
Start: 2021-05-17 | End: 2021-05-17 | Stop reason: HOSPADM

## 2021-05-17 RX ORDER — SODIUM CHLORIDE 0.9 % (FLUSH) 0.9 %
3 SYRINGE (ML) INJECTION EVERY 12 HOURS SCHEDULED
Status: DISCONTINUED | OUTPATIENT
Start: 2021-05-17 | End: 2021-05-17 | Stop reason: HOSPADM

## 2021-05-17 RX ORDER — PROPOFOL 10 MG/ML
VIAL (ML) INTRAVENOUS CONTINUOUS PRN
Status: DISCONTINUED | OUTPATIENT
Start: 2021-05-17 | End: 2021-05-17 | Stop reason: SURG

## 2021-05-17 RX ADMIN — LIDOCAINE HYDROCHLORIDE 80 MG: 20 INJECTION, SOLUTION INFILTRATION; PERINEURAL at 15:12

## 2021-05-17 RX ADMIN — SODIUM CHLORIDE, POTASSIUM CHLORIDE, SODIUM LACTATE AND CALCIUM CHLORIDE 30 ML/HR: 600; 310; 30; 20 INJECTION, SOLUTION INTRAVENOUS at 14:25

## 2021-05-17 RX ADMIN — PROPOFOL 125 MCG/KG/MIN: 10 INJECTION, EMULSION INTRAVENOUS at 15:12

## 2021-05-17 NOTE — ANESTHESIA PREPROCEDURE EVALUATION
Anesthesia Evaluation     Patient summary reviewed and Nursing notes reviewed   no history of anesthetic complications:  NPO Solid Status: > 6 hours  NPO Liquid Status: > 6 hours           Airway   Mallampati: II  TM distance: >3 FB  Neck ROM: full  no difficulty expected and No difficulty expected  Dental - normal exam     Pulmonary - normal exam    breath sounds clear to auscultation  (+) COPD, asthma,home oxygen,   (-) rhonchi, decreased breath sounds, wheezes, rales, stridor    ROS comment: Pulmonary Hypertension  Cardiovascular - normal exam    NYHA Classification: I  Rhythm: regular  Rate: normal    (+) hypertension, valvular problems/murmurs, dysrhythmias Paroxysmal Atrial Fib, Atrial Flutter, CHF , PVD, hyperlipidemia,   (-) murmur, weak pulses, friction rub, systolic click, carotid bruits, JVD, peripheral edema    ROS comment: S/P Tricuspid Valve Repair & Mitral Valve Replacement    Neuro/Psych- negative ROS  GI/Hepatic/Renal/Endo    (+)  hiatal hernia, GERD,      ROS Comment: Hx of Colon Polyps      Musculoskeletal     (+) back pain, chronic pain,   Abdominal  - normal exam    Abdomen: soft.   Substance History - negative use     OB/GYN negative ob/gyn ROS         Other   blood dyscrasia anemia,                     Anesthesia Plan    ASA 3     MAC     intravenous induction     Anesthetic plan, all risks, benefits, and alternatives have been provided, discussed and informed consent has been obtained with: patient.

## 2021-05-17 NOTE — ANESTHESIA POSTPROCEDURE EVALUATION
Patient: Paz Browne    Procedure Summary     Date: 05/17/21 Room / Location: [facility] ENDOSCOPY 5 /  [facility]    Anesthesia Start: 1512 Anesthesia Stop: 1557    Procedures:       ESOPHAGOGASTRODUODENOSCOPY  with biopsies (N/A Esophagus)      COLONOSCOPY to cecum (N/A ) Diagnosis:       Iron deficiency anemia, unspecified iron deficiency anemia type      Adenomatous polyp of colon, unspecified part of colon      Dark stools      Gastroesophageal reflux disease without esophagitis      (Iron deficiency anemia, unspecified iron deficiency anemia type [D50.9])      (Adenomatous polyp of colon, unspecified part of colon [D12.6])      (Dark stools [R19.5])      (Gastroesophageal reflux disease without esophagitis [K21.9])    Surgeons: Cleveland Devine MD Provider: Sergei Sommers MD    Anesthesia Type: MAC ASA Status: 3          Anesthesia Type: MAC    Vitals  No vitals data found for the desired time range.          Post Anesthesia Care and Evaluation    Patient location during evaluation: PHASE II  Anesthetic complications: No anesthetic complications

## 2021-05-18 ENCOUNTER — LAB (OUTPATIENT)
Dept: FAMILY MEDICINE CLINIC | Facility: CLINIC | Age: 66
End: 2021-05-18

## 2021-05-18 ENCOUNTER — TELEPHONE (OUTPATIENT)
Dept: FAMILY MEDICINE CLINIC | Facility: CLINIC | Age: 66
End: 2021-05-18

## 2021-05-18 DIAGNOSIS — R79.89 ELEVATED FERRITIN: ICD-10-CM

## 2021-05-18 DIAGNOSIS — I10 ESSENTIAL HYPERTENSION: Primary | ICD-10-CM

## 2021-05-18 DIAGNOSIS — I10 ESSENTIAL HYPERTENSION: ICD-10-CM

## 2021-05-18 DIAGNOSIS — E87.6 HYPOKALEMIA: ICD-10-CM

## 2021-05-18 DIAGNOSIS — E55.9 VITAMIN D DEFICIENCY, UNSPECIFIED: ICD-10-CM

## 2021-05-18 LAB
25(OH)D3 SERPL-MCNC: 40.7 NG/ML (ref 30–100)
ALBUMIN SERPL-MCNC: 4 G/DL (ref 3.5–5.2)
ALBUMIN/GLOB SERPL: 1.8 G/DL
ALP SERPL-CCNC: 60 U/L (ref 39–117)
ALT SERPL W P-5'-P-CCNC: 13 U/L (ref 1–33)
ANION GAP SERPL CALCULATED.3IONS-SCNC: 8.6 MMOL/L (ref 5–15)
AST SERPL-CCNC: 20 U/L (ref 1–32)
BILIRUB SERPL-MCNC: 0.6 MG/DL (ref 0–1.2)
BUN SERPL-MCNC: 9 MG/DL (ref 8–23)
BUN/CREAT SERPL: 9.5 (ref 7–25)
CALCIUM SPEC-SCNC: 9.3 MG/DL (ref 8.6–10.5)
CHLORIDE SERPL-SCNC: 101 MMOL/L (ref 98–107)
CHOLEST SERPL-MCNC: 137 MG/DL (ref 0–200)
CO2 SERPL-SCNC: 35.4 MMOL/L (ref 22–29)
CREAT SERPL-MCNC: 0.95 MG/DL (ref 0.57–1)
ERYTHROCYTE [DISTWIDTH] IN BLOOD BY AUTOMATED COUNT: 12.9 % (ref 12.3–15.4)
FERRITIN SERPL-MCNC: 718 NG/ML (ref 13–150)
GFR SERPL CREATININE-BSD FRML MDRD: 59 ML/MIN/1.73
GLOBULIN UR ELPH-MCNC: 2.2 GM/DL
GLUCOSE SERPL-MCNC: 87 MG/DL (ref 65–99)
HCT VFR BLD AUTO: 29 % (ref 34–46.6)
HDLC SERPL-MCNC: 61 MG/DL (ref 40–60)
HGB BLD-MCNC: 8.8 G/DL (ref 12–15.9)
LDLC SERPL CALC-MCNC: 57 MG/DL (ref 0–100)
LDLC/HDLC SERPL: 0.91 {RATIO}
MCH RBC QN AUTO: 29.4 PG (ref 26.6–33)
MCHC RBC AUTO-ENTMCNC: 30.5 G/DL (ref 31.5–35.7)
MCV RBC AUTO: 96.5 FL (ref 79–97)
PLATELET # BLD AUTO: 141 10*3/MM3 (ref 140–450)
PMV BLD AUTO: 6.8 FL (ref 6–12)
POTASSIUM SERPL-SCNC: 3.2 MMOL/L (ref 3.5–5.2)
PROT SERPL-MCNC: 6.2 G/DL (ref 6–8.5)
RBC # BLD AUTO: 3 10*6/MM3 (ref 3.77–5.28)
SODIUM SERPL-SCNC: 145 MMOL/L (ref 136–145)
TRIGL SERPL-MCNC: 102 MG/DL (ref 0–150)
VLDLC SERPL-MCNC: 19 MG/DL (ref 5–40)
WBC # BLD AUTO: 7.5 10*3/MM3 (ref 3.4–10.8)

## 2021-05-18 PROCEDURE — 82728 ASSAY OF FERRITIN: CPT | Performed by: INTERNAL MEDICINE

## 2021-05-18 PROCEDURE — 80053 COMPREHEN METABOLIC PANEL: CPT | Performed by: INTERNAL MEDICINE

## 2021-05-18 PROCEDURE — 36415 COLL VENOUS BLD VENIPUNCTURE: CPT | Performed by: INTERNAL MEDICINE

## 2021-05-18 PROCEDURE — 82306 VITAMIN D 25 HYDROXY: CPT | Performed by: INTERNAL MEDICINE

## 2021-05-18 PROCEDURE — 85027 COMPLETE CBC AUTOMATED: CPT | Performed by: INTERNAL MEDICINE

## 2021-05-18 PROCEDURE — 80061 LIPID PANEL: CPT | Performed by: INTERNAL MEDICINE

## 2021-05-18 RX ORDER — POTASSIUM CHLORIDE 750 MG/1
TABLET, FILM COATED, EXTENDED RELEASE ORAL
Qty: 30 TABLET | Refills: 5 | Status: SHIPPED | OUTPATIENT
Start: 2021-05-18 | End: 2021-07-20 | Stop reason: SDUPTHER

## 2021-05-18 NOTE — TELEPHONE ENCOUNTER
Caller: Paz Browne    Relationship: Self    Best call back number: [phone]    What medication are you requesting: ANTIBIOTIC OR STEROID     What are your current symptoms: SHORTNESS OF BREATH WHEN MOVING AROUND     How long have you been experiencing symptoms: ABOUT A WEEK     Have you had these symptoms before:    [x] Yes  [] No    Have you been treated for these symptoms before:   [x] Yes  [] No    If a prescription is needed, what is your preferred pharmacy and phone number: [facility] #[id number] - [facility] [address] [facility] - [phone] [facility] [phone] FX

## 2021-05-19 ENCOUNTER — TELEPHONE (OUTPATIENT)
Dept: FAMILY MEDICINE CLINIC | Facility: CLINIC | Age: 66
End: 2021-05-19

## 2021-05-19 NOTE — TELEPHONE ENCOUNTER
Caller: Paz Browne    Relationship to patient: Self    Best call back number: [phone]    Patient STATES SHE IS RETURNING A PHONE CALL TO TRISTIAN.

## 2021-05-20 ENCOUNTER — TELEPHONE (OUTPATIENT)
Dept: FAMILY MEDICINE CLINIC | Facility: CLINIC | Age: 66
End: 2021-05-20

## 2021-05-20 ENCOUNTER — HOSPITAL ENCOUNTER (EMERGENCY)
Facility: HOSPITAL | Age: 66
Discharge: HOME | End: 2021-05-20
Attending: EMERGENCY MEDICINE | Admitting: EMERGENCY MEDICINE

## 2021-05-20 ENCOUNTER — APPOINTMENT (OUTPATIENT)
Dept: GENERAL RADIOLOGY | Facility: HOSPITAL | Age: 66
End: 2021-05-20

## 2021-05-20 VITALS
DIASTOLIC BLOOD PRESSURE: 87 MMHG | RESPIRATION RATE: 18 BRPM | BODY MASS INDEX: 21.97 KG/M2 | SYSTOLIC BLOOD PRESSURE: 190 MMHG | TEMPERATURE: 97.5 F | WEIGHT: 140 LBS | OXYGEN SATURATION: 97 % | HEART RATE: 89 BPM | HEIGHT: 67 IN

## 2021-05-20 DIAGNOSIS — D50.0 IRON DEFICIENCY ANEMIA DUE TO CHRONIC BLOOD LOSS: ICD-10-CM

## 2021-05-20 DIAGNOSIS — J44.1 COPD EXACERBATION (HCC): Primary | ICD-10-CM

## 2021-05-20 LAB
ALBUMIN SERPL-MCNC: 3.9 G/DL (ref 3.5–5.2)
ALBUMIN/GLOB SERPL: 1.7 G/DL
ALP SERPL-CCNC: 63 U/L (ref 39–117)
ALT SERPL W P-5'-P-CCNC: 17 U/L (ref 1–33)
ANION GAP SERPL CALCULATED.3IONS-SCNC: 8.2 MMOL/L (ref 5–15)
AST SERPL-CCNC: 19 U/L (ref 1–32)
BASOPHILS # BLD AUTO: 0.03 10*3/MM3 (ref 0–0.2)
BASOPHILS NFR BLD AUTO: 0.5 % (ref 0–1.5)
BILIRUB SERPL-MCNC: 0.3 MG/DL (ref 0–1.2)
BUN SERPL-MCNC: 12 MG/DL (ref 8–23)
BUN/CREAT SERPL: 14.5 (ref 7–25)
CALCIUM SPEC-SCNC: 8.8 MG/DL (ref 8.6–10.5)
CHLORIDE SERPL-SCNC: 104 MMOL/L (ref 98–107)
CO2 SERPL-SCNC: 30.8 MMOL/L (ref 22–29)
CREAT SERPL-MCNC: 0.83 MG/DL (ref 0.57–1)
DEPRECATED RDW RBC AUTO: 40.2 FL (ref 37–54)
EOSINOPHIL # BLD AUTO: 0.15 10*3/MM3 (ref 0–0.4)
EOSINOPHIL NFR BLD AUTO: 2.3 % (ref 0.3–6.2)
ERYTHROCYTE [DISTWIDTH] IN BLOOD BY AUTOMATED COUNT: 12 % (ref 12.3–15.4)
GFR SERPL CREATININE-BSD FRML MDRD: 69 ML/MIN/1.73
GLOBULIN UR ELPH-MCNC: 2.3 GM/DL
GLUCOSE SERPL-MCNC: 102 MG/DL (ref 65–99)
HCT VFR BLD AUTO: 26 % (ref 34–46.6)
HGB BLD-MCNC: 8.7 G/DL (ref 12–15.9)
HOLD SPECIMEN: NORMAL
HOLD SPECIMEN: NORMAL
IMM GRANULOCYTES # BLD AUTO: 0.03 10*3/MM3 (ref 0–0.05)
IMM GRANULOCYTES NFR BLD AUTO: 0.5 % (ref 0–0.5)
LAB AP CASE REPORT: NORMAL
LDH SERPL-CCNC: 188 U/L (ref 135–214)
LYMPHOCYTES # BLD AUTO: 0.97 10*3/MM3 (ref 0.7–3.1)
LYMPHOCYTES NFR BLD AUTO: 15.1 % (ref 19.6–45.3)
MCH RBC QN AUTO: 30.9 PG (ref 26.6–33)
MCHC RBC AUTO-ENTMCNC: 33.5 G/DL (ref 31.5–35.7)
MCV RBC AUTO: 92.2 FL (ref 79–97)
MONOCYTES # BLD AUTO: 0.34 10*3/MM3 (ref 0.1–0.9)
MONOCYTES NFR BLD AUTO: 5.3 % (ref 5–12)
NEUTROPHILS NFR BLD AUTO: 4.92 10*3/MM3 (ref 1.7–7)
NEUTROPHILS NFR BLD AUTO: 76.3 % (ref 42.7–76)
NRBC BLD AUTO-RTO: 0 /100 WBC (ref 0–0.2)
NT-PROBNP SERPL-MCNC: 3281 PG/ML (ref 0–900)
PATH REPORT.ADDENDUM SPEC: NORMAL
PATH REPORT.FINAL DX SPEC: NORMAL
PATH REPORT.GROSS SPEC: NORMAL
PLATELET # BLD AUTO: 190 10*3/MM3 (ref 140–450)
PMV BLD AUTO: 10.8 FL (ref 6–12)
POTASSIUM SERPL-SCNC: 3.5 MMOL/L (ref 3.5–5.2)
PROT SERPL-MCNC: 6.2 G/DL (ref 6–8.5)
RBC # BLD AUTO: 2.82 10*6/MM3 (ref 3.77–5.28)
SODIUM SERPL-SCNC: 143 MMOL/L (ref 136–145)
TROPONIN T SERPL-MCNC: <0.01 NG/ML (ref 0–0.03)
WBC # BLD AUTO: 6.44 10*3/MM3 (ref 3.4–10.8)
WHOLE BLOOD HOLD SPECIMEN: NORMAL
WHOLE BLOOD HOLD SPECIMEN: NORMAL

## 2021-05-20 PROCEDURE — 94799 UNLISTED PULMONARY SVC/PX: CPT

## 2021-05-20 PROCEDURE — 99284 EMERGENCY DEPT VISIT MOD MDM: CPT

## 2021-05-20 PROCEDURE — 36415 COLL VENOUS BLD VENIPUNCTURE: CPT

## 2021-05-20 PROCEDURE — 93005 ELECTROCARDIOGRAM TRACING: CPT

## 2021-05-20 PROCEDURE — 71046 X-RAY EXAM CHEST 2 VIEWS: CPT

## 2021-05-20 PROCEDURE — 84484 ASSAY OF TROPONIN QUANT: CPT

## 2021-05-20 PROCEDURE — 94760 N-INVAS EAR/PLS OXIMETRY 1: CPT

## 2021-05-20 PROCEDURE — 94640 AIRWAY INHALATION TREATMENT: CPT

## 2021-05-20 PROCEDURE — 96374 THER/PROPH/DIAG INJ IV PUSH: CPT

## 2021-05-20 PROCEDURE — 83615 LACTATE (LD) (LDH) ENZYME: CPT | Performed by: INTERNAL MEDICINE

## 2021-05-20 PROCEDURE — 80053 COMPREHEN METABOLIC PANEL: CPT

## 2021-05-20 PROCEDURE — 83880 ASSAY OF NATRIURETIC PEPTIDE: CPT

## 2021-05-20 PROCEDURE — 93010 ELECTROCARDIOGRAM REPORT: CPT | Performed by: INTERNAL MEDICINE

## 2021-05-20 PROCEDURE — 25010000002 METHYLPREDNISOLONE PER 125 MG: Performed by: PHYSICIAN ASSISTANT

## 2021-05-20 PROCEDURE — 93005 ELECTROCARDIOGRAM TRACING: CPT | Performed by: EMERGENCY MEDICINE

## 2021-05-20 PROCEDURE — 85025 COMPLETE CBC W/AUTO DIFF WBC: CPT

## 2021-05-20 RX ORDER — METHYLPREDNISOLONE SODIUM SUCCINATE 125 MG/2ML
125 INJECTION, POWDER, LYOPHILIZED, FOR SOLUTION INTRAMUSCULAR; INTRAVENOUS ONCE
Status: COMPLETED | OUTPATIENT
Start: 2021-05-20 | End: 2021-05-20

## 2021-05-20 RX ORDER — ROFLUMILAST 500 UG/1
500 TABLET ORAL DAILY
Qty: 90 TABLET | Refills: 1 | Status: SHIPPED | OUTPATIENT
Start: 2021-05-20 | End: 2021-05-21 | Stop reason: SDUPTHER

## 2021-05-20 RX ORDER — ROFLUMILAST 500 UG/1
500 TABLET ORAL DAILY
Qty: 90 TABLET | Refills: 1 | Status: SHIPPED | OUTPATIENT
Start: 2021-05-20 | End: 2021-05-20 | Stop reason: SDUPTHER

## 2021-05-20 RX ORDER — METHYLPREDNISOLONE 4 MG/1
TABLET ORAL
Qty: 21 TABLET | Refills: 0 | Status: SHIPPED | OUTPATIENT
Start: 2021-05-20 | End: 2021-05-31 | Stop reason: SDUPTHER

## 2021-05-20 RX ORDER — HYDROCODONE BITARTRATE AND ACETAMINOPHEN 7.5; 325 MG/1; MG/1
1 TABLET ORAL ONCE
Status: COMPLETED | OUTPATIENT
Start: 2021-05-20 | End: 2021-05-20

## 2021-05-20 RX ORDER — ALBUTEROL SULFATE 2.5 MG/3ML
2.5 SOLUTION RESPIRATORY (INHALATION)
Status: COMPLETED | OUTPATIENT
Start: 2021-05-20 | End: 2021-05-20

## 2021-05-20 RX ORDER — DOXYCYCLINE 100 MG/1
100 CAPSULE ORAL 2 TIMES DAILY
Qty: 14 CAPSULE | Refills: 0 | Status: SHIPPED | OUTPATIENT
Start: 2021-05-20 | End: 2021-05-27

## 2021-05-20 RX ORDER — IPRATROPIUM BROMIDE AND ALBUTEROL SULFATE 2.5; .5 MG/3ML; MG/3ML
3 SOLUTION RESPIRATORY (INHALATION) ONCE
Status: COMPLETED | OUTPATIENT
Start: 2021-05-20 | End: 2021-05-20

## 2021-05-20 RX ORDER — SODIUM CHLORIDE 0.9 % (FLUSH) 0.9 %
10 SYRINGE (ML) INJECTION AS NEEDED
Status: DISCONTINUED | OUTPATIENT
Start: 2021-05-20 | End: 2021-05-21 | Stop reason: HOSPADM

## 2021-05-20 RX ADMIN — IPRATROPIUM BROMIDE AND ALBUTEROL SULFATE 3 ML: 2.5; .5 SOLUTION RESPIRATORY (INHALATION) at 18:50

## 2021-05-20 RX ADMIN — ALBUTEROL SULFATE 2.5 MG: 2.5 SOLUTION RESPIRATORY (INHALATION) at 19:24

## 2021-05-20 RX ADMIN — HYDROCODONE BITARTRATE AND ACETAMINOPHEN 1 TABLET: 7.5; 325 TABLET ORAL at 19:27

## 2021-05-20 RX ADMIN — ALBUTEROL SULFATE 2.5 MG: 2.5 SOLUTION RESPIRATORY (INHALATION) at 19:42

## 2021-05-20 RX ADMIN — METHYLPREDNISOLONE SODIUM SUCCINATE 125 MG: 125 INJECTION, POWDER, FOR SOLUTION INTRAMUSCULAR; INTRAVENOUS at 18:39

## 2021-05-20 NOTE — TELEPHONE ENCOUNTER
Caller: PavanChapincito    Relationship: Emergency Contact    Best call back number: [phone]    Medication needed:   Requested Prescriptions     Pending Prescriptions Disp Refills   • roflumilast (DALIRESP) 500 MCG tablet tablet 90 tablet 1     Sig: Take 1 tablet by mouth Daily.       When do you need the refill by:ASAP    What additional details did the patient provide when requesting the medication: PATIENT'S  IS REQUESTING A WRITTEN PRESCRIPTION OF THIS MEDICATION.  PLEASE CALL WHEN READY SO HE CAN STOP BY AND PICK IT UP. HE STATES THEY NEED A 90 DAY SUPPLY SO THAT IT IS CHEAPER WITH THEIR INSURANCE    Does the patient have less than a 3 day supply:  [] Yes  [x] No

## 2021-05-21 ENCOUNTER — EPISODE CHANGES (OUTPATIENT)
Dept: CASE MANAGEMENT | Facility: OTHER | Age: 66
End: 2021-05-21

## 2021-05-21 LAB — QT INTERVAL: 364 MS

## 2021-05-21 RX ORDER — ROFLUMILAST 500 UG/1
500 TABLET ORAL DAILY
Qty: 90 TABLET | Refills: 0 | Status: SHIPPED | OUTPATIENT
Start: 2021-05-21 | End: 2022-10-26 | Stop reason: SDUPTHER

## 2021-05-21 NOTE — TELEPHONE ENCOUNTER
Called and spoke to Paz informed her DR Martinez sent this to [facility] but she needed it printed because it cost to much at [facility].

## 2021-05-24 RX ORDER — FERROUS SULFATE 325(65) MG
TABLET ORAL
Qty: 180 TABLET | Refills: 1 | Status: SHIPPED | OUTPATIENT
Start: 2021-05-24 | End: 2021-06-04

## 2021-05-24 RX ORDER — ATORVASTATIN CALCIUM 40 MG/1
TABLET, FILM COATED ORAL
Qty: 90 TABLET | Refills: 2 | Status: SHIPPED | OUTPATIENT
Start: 2021-05-24 | End: 2021-12-23

## 2021-05-26 ENCOUNTER — TELEPHONE (OUTPATIENT)
Dept: ONCOLOGY | Facility: CLINIC | Age: 66
End: 2021-05-26

## 2021-05-26 NOTE — TELEPHONE ENCOUNTER
Caller: Paz Browne    Relationship to patient: Self    Best call back number: [phone]    Chief complaint: PT WANTS TO CANCEL APPT, DOESN'T WANT TO RESCHEDULE AT THIS TIME    Type of visit: FOLLOW UP    Requested date:     If rescheduling, when is the original appointment: 05/27    Additional notes:

## 2021-05-28 ENCOUNTER — APPOINTMENT (OUTPATIENT)
Dept: LAB | Facility: HOSPITAL | Age: 66
End: 2021-05-28

## 2021-05-31 ENCOUNTER — DOCUMENTATION (OUTPATIENT)
Dept: FAMILY MEDICINE CLINIC | Facility: CLINIC | Age: 66
End: 2021-05-31

## 2021-05-31 RX ORDER — METHYLPREDNISOLONE 4 MG/1
TABLET ORAL
Qty: 21 TABLET | Refills: 0 | Status: SHIPPED | OUTPATIENT
Start: 2021-05-31 | End: 2021-06-04

## 2021-06-01 ENCOUNTER — TELEPHONE (OUTPATIENT)
Dept: FAMILY MEDICINE CLINIC | Facility: CLINIC | Age: 66
End: 2021-06-01

## 2021-06-01 NOTE — TELEPHONE ENCOUNTER
Patient's  called on call provider 05/31/21 c/o SOA just had recent hospitalization for COPD exacerbation completed steroid dose pack and doxycycline request refill steroid dose pack, refilled and monitor, if sx persist or worsen go to ER and FU with Dr Lorie gibbs. Still doing nebulizer and using oxygen. Please call to check on her

## 2021-06-04 ENCOUNTER — OFFICE VISIT (OUTPATIENT)
Dept: ONCOLOGY | Facility: CLINIC | Age: 66
End: 2021-06-04

## 2021-06-04 ENCOUNTER — LAB (OUTPATIENT)
Dept: LAB | Facility: HOSPITAL | Age: 66
End: 2021-06-04

## 2021-06-04 ENCOUNTER — TELEPHONE (OUTPATIENT)
Dept: ONCOLOGY | Facility: HOSPITAL | Age: 66
End: 2021-06-04

## 2021-06-04 ENCOUNTER — TELEPHONE (OUTPATIENT)
Dept: ONCOLOGY | Facility: CLINIC | Age: 66
End: 2021-06-04

## 2021-06-04 VITALS
RESPIRATION RATE: 20 BRPM | DIASTOLIC BLOOD PRESSURE: 84 MMHG | WEIGHT: 139.3 LBS | BODY MASS INDEX: 21.87 KG/M2 | SYSTOLIC BLOOD PRESSURE: 175 MMHG | TEMPERATURE: 98.2 F | HEART RATE: 93 BPM | HEIGHT: 67 IN | OXYGEN SATURATION: 93 %

## 2021-06-04 DIAGNOSIS — D50.9 IRON DEFICIENCY ANEMIA, UNSPECIFIED IRON DEFICIENCY ANEMIA TYPE: ICD-10-CM

## 2021-06-04 DIAGNOSIS — D64.9 ANEMIA, UNSPECIFIED TYPE: Primary | ICD-10-CM

## 2021-06-04 DIAGNOSIS — D50.0 IRON DEFICIENCY ANEMIA DUE TO CHRONIC BLOOD LOSS: ICD-10-CM

## 2021-06-04 LAB
ANION GAP SERPL CALCULATED.3IONS-SCNC: 10.5 MMOL/L (ref 5–15)
BASOPHILS # BLD AUTO: 0.02 10*3/MM3 (ref 0–0.2)
BASOPHILS NFR BLD AUTO: 0.2 % (ref 0–1.5)
BUN SERPL-MCNC: 13 MG/DL (ref 6–20)
BUN/CREAT SERPL: 14.8 (ref 7.3–30)
CALCIUM SPEC-SCNC: 9.3 MG/DL (ref 8.5–10.2)
CHLORIDE SERPL-SCNC: 100 MMOL/L (ref 98–107)
CO2 SERPL-SCNC: 31.5 MMOL/L (ref 22–29)
CREAT SERPL-MCNC: 0.88 MG/DL (ref 0.6–1.1)
DEPRECATED RDW RBC AUTO: 48 FL (ref 37–54)
EOSINOPHIL # BLD AUTO: 0.04 10*3/MM3 (ref 0–0.4)
EOSINOPHIL NFR BLD AUTO: 0.4 % (ref 0.3–6.2)
ERYTHROCYTE [DISTWIDTH] IN BLOOD BY AUTOMATED COUNT: 13.6 % (ref 12.3–15.4)
FERRITIN SERPL-MCNC: 395.9 NG/ML (ref 13–150)
GFR SERPL CREATININE-BSD FRML MDRD: 64 ML/MIN/1.73
GLUCOSE SERPL-MCNC: 106 MG/DL (ref 74–124)
HCT VFR BLD AUTO: 32.1 % (ref 34–46.6)
HGB BLD-MCNC: 10.1 G/DL (ref 12–15.9)
IMM GRANULOCYTES # BLD AUTO: 0.04 10*3/MM3 (ref 0–0.05)
IMM GRANULOCYTES NFR BLD AUTO: 0.4 % (ref 0–0.5)
IRON 24H UR-MRATE: 32 MCG/DL (ref 37–145)
IRON SATN MFR SERPL: 11 % (ref 14–48)
LYMPHOCYTES # BLD AUTO: 1.33 10*3/MM3 (ref 0.7–3.1)
LYMPHOCYTES NFR BLD AUTO: 12.8 % (ref 19.6–45.3)
MCH RBC QN AUTO: 30.3 PG (ref 26.6–33)
MCHC RBC AUTO-ENTMCNC: 31.5 G/DL (ref 31.5–35.7)
MCV RBC AUTO: 96.4 FL (ref 79–97)
MONOCYTES # BLD AUTO: 0.46 10*3/MM3 (ref 0.1–0.9)
MONOCYTES NFR BLD AUTO: 4.4 % (ref 5–12)
NEUTROPHILS NFR BLD AUTO: 8.53 10*3/MM3 (ref 1.7–7)
NEUTROPHILS NFR BLD AUTO: 81.8 % (ref 42.7–76)
NRBC BLD AUTO-RTO: 0 /100 WBC (ref 0–0.2)
PLATELET # BLD AUTO: 259 10*3/MM3 (ref 140–450)
PMV BLD AUTO: 9.9 FL (ref 6–12)
POTASSIUM SERPL-SCNC: 3.4 MMOL/L (ref 3.5–4.7)
RBC # BLD AUTO: 3.33 10*6/MM3 (ref 3.77–5.28)
SODIUM SERPL-SCNC: 142 MMOL/L (ref 134–145)
TIBC SERPL-MCNC: 291 MCG/DL (ref 249–505)
TRANSFERRIN SERPL-MCNC: 208 MG/DL (ref 200–360)
WBC # BLD AUTO: 10.42 10*3/MM3 (ref 3.4–10.8)

## 2021-06-04 PROCEDURE — 36415 COLL VENOUS BLD VENIPUNCTURE: CPT

## 2021-06-04 PROCEDURE — 85025 COMPLETE CBC W/AUTO DIFF WBC: CPT

## 2021-06-04 PROCEDURE — 82728 ASSAY OF FERRITIN: CPT

## 2021-06-04 PROCEDURE — 80053 COMPREHEN METABOLIC PANEL: CPT

## 2021-06-04 PROCEDURE — 80048 BASIC METABOLIC PNL TOTAL CA: CPT

## 2021-06-04 PROCEDURE — 83540 ASSAY OF IRON: CPT

## 2021-06-04 PROCEDURE — 99213 OFFICE O/P EST LOW 20 MIN: CPT | Performed by: INTERNAL MEDICINE

## 2021-06-04 PROCEDURE — 84466 ASSAY OF TRANSFERRIN: CPT

## 2021-06-04 NOTE — PROGRESS NOTES
.     REASON FOR FOLLOWUP:     *  Anemia in the setting of a stage III chronic renal insufficiency.  Patient had a significant elevated ferritin in March 2021.  She also had supratherapeutic folate and vitamin B12 level.                                  HISTORY OF PRESENT ILLNESS:  The patient is a 66 y.o. year old female patient with history of hypertension, emphysema secondary to long time cigarette smoking, currently on home O2 at 3 liters per minute, and history of both tricuspid valve repair and mitral valve replacement with tissue graft, and also history of GI bleeding in 2017, who presented today for urgent reevaluation, requested by her pulmonologist Dr. Melo because of worsening anemia with a hemoglobin 8.7 on 5/20/2021.  Patient is accompanied by her son today.    Patient presented originally on 4/9/2021 to our clinic for initial evaluation because of elevated ferritin and anemia.  Patient had a normal hemoglobin 12.5 at that time.    On 3/8/2021, she had hemoglobin 10.8, excellent folate > 20 ng/mL, vitamin B12 at 1018 ng/mL.  She had iron saturation 16% with free iron 57 and TIBC 346.  On 3/9/2021 she had elevated ferritin 794 ng/mL with a chemistry lab creatinine 1.23, eGFR 44 mL/min.    This patient had a hospitalization in May 2021, because of significant hypokalemia.  She was admitted for management at the [facility].  During hospitalization, she had a dora hemoglobin 8.8 on 5/11/2021.      Patient reports she had EGD and colonoscopy examination by Dr. Devine on 5/17/2021.  I reviewed procedure report and also pathology report.  There was esophagitis, no evidence for gastritis or duodenitis.  No evidence for malignancy.  Colonoscopy examination was benign except for sigmoid diverticula.    On 5/18/2021, patient had hemoglobin of 8.8, platelets 141,000 and WBC 7500 without differentiation.  On 5/20/2021 she had a hemoglobin 8.7, platelets 190,000, WBC 6440 including ANC  4920.    Earlier today, patient was seen by her pulmonologist Dr. Melo for reevaluation.  Because of decreasing hemoglobin, Dr. Melo urged patient to come in for reevaluation.    Patient reports that she has no melena hematochezia or other bleeding.  She has known dizziness or fainting.    Laboratory study study 6/4/2021 reported improved hemoglobin 10.1, maintains normal platelets 259,000, and WBC 10,420 including ANC 8530 lymphocytes 1330.    Patient reports she is not on oral iron supplementation.    Past Medical History:   Diagnosis Date   • VAN (acute kidney injury) (CMS/HCC)    • Anemia    • Asthma    • Atrial flutter (CMS/HCC)     cardioversion   • Cataract    • Celiac artery stenosis (CMS/HCC)    • Chronic respiratory failure with hypoxia (CMS/HCC)    • Colon polyp    • COPD (chronic obstructive pulmonary disease) (CMS/HCC)    • Emphysema of lung (CMS/HCC)    • GI bleed    • Hiatal hernia    • History of CHF (congestive heart failure)     due to MR   • History of home oxygen therapy     3 lpm NC   • History of mitral valve replacement with tissue graft    • Hyperlipidemia    • Hypertension    • Infectious viral hepatitis     B   • Intertrigo    • Long term (current) use of anticoagulants    • Mitral regurgitation     s/p tissue MVR   • PAF (paroxysmal atrial fibrillation) (CMS/HCC)     s/p MAZE   • Pneumonia    • Pulmonary hypertension (CMS/HCC)    • S/P TVR (tricuspid valve repair) 7/7/2016   · Hepatitis B   · Former heavy smoker.     Past Surgical History:   Procedure Laterality Date   • CARDIAC CATHETERIZATION  09/01/2014    Right dominant systemt, normal coronary arteries.    • CARDIAC CATHETERIZATION Left 6/10/2016    Procedure: Cardiac catheterization;  Surgeon: Sergei Hall MD;  Location:  [facility];  Service:    • CARDIAC CATHETERIZATION N/A 6/10/2016    Procedure: Right Heart Cath;  Surgeon: Sergei Hall MD;  Location:  [facility];  Service:    • CATARACT  EXTRACTION     • COLONOSCOPY     • COLONOSCOPY N/A 8/4/2017    Procedure: COLONOSCOPY TO CECUM/TI WITH POLYPECTOMY ( COLD BX);  Surgeon: Cleveland Devine MD;  Location: [facility] ENDOSCOPY;  Service:    • COLONOSCOPY N/A 8/10/2017    Procedure: COLONOSCOPY to cecum and TI with 2 clips placed at transverse;  Surgeon: Eranest PALOMO MD;  Location: [facility] ENDOSCOPY;  Service:    • COLONOSCOPY N/A 12/22/2017    Procedure: COLONOSCOPY INTO CECUM WITH COLD POLYPECTOMIES;  Surgeon: Cleveland Devine MD;  Location: [facility] ENDOSCOPY;  Service:    • COLONOSCOPY N/A 5/17/2021    Procedure: COLONOSCOPY to cecum;  Surgeon: Cleveland Devine MD;  Location: [facility] ENDOSCOPY;  Service: Gastroenterology;  Laterality: N/A;  Pre: Fe deficency anemia, h/x of polyps  Post: fair prep, normal   • ENDOSCOPY N/A 8/17/2017    Procedure: ESOPHAGOGASTRODUODENOSCOPY;  Surgeon: Porsha Ruby MD;  Location: [facility] ENDOSCOPY;  Service:    • ENDOSCOPY N/A 12/22/2017    Procedure: ESOPHAGOGASTRODUODENOSCOPY WITH BIOPSIES;  Surgeon: Cleveland Devine MD;  Location: [facility] ENDOSCOPY;  Service:    • ENDOSCOPY N/A 5/17/2021    Procedure: ESOPHAGOGASTRODUODENOSCOPY  with biopsies;  Surgeon: Cleveland Devine MD;  Location: [facility] ENDOSCOPY;  Service: Gastroenterology;  Laterality: N/A;  Pre: Fe deficency anemia, nausea, heme positive stool   Post: gastritis, sloughing of distal esophagus mucosa   • GALLBLADDER SURGERY     • HEMORRHOIDECTOMY     • HYSTERECTOMY     • KIDNEY SURGERY  04/22/2013    Stent placement   • MAZE PROCEDURE     • MITRAL VALVE REPLACEMENT     • TONSILLECTOMY     • TRICUSPID VALVE REPLACEMENT       HEMATOLOGY HISTORY:  The patient is a 66 y.o. year old female patient with history of hypertension, emphysema secondary to long time cigarette smoking, currently on home O2 at 3 liters per minute, and history of both tricuspid valve repair and mitral valve replacement with tissue graft, and also history of GI bleeding in 2017, who presented on 4/9/2021 to  the clinic for initial evaluation because of elevated ferritin and anemia. The patient is accompanied by her  who helped with history.     This patient had a history of GI bleeding, required hospitalization in Aug 2017 with a dora hemoglobin 8.0 on 8/15/2017.  Iron study on 8/13/2017 reported serum ferritin 51.4 ng/mL, free iron 25 TIBC 446 and iron saturation 6% with vitamin B12 level 892 pg/mL, and RBC folate 1935 ng/mL.     Subsequently patient had multiple ferritin study, reporting ferritin 96 ng/mL on 11/16/2017, ferritin 116.9 ng/mL and iron saturation 17% with free iron 61 TIBC 361 on 12/22/2017, RBC folate was 1609 and vitamin B12 at 855.  However hemoglobin was 10.8 and MCV 88.5 MCHC 32.0.   On 2/21/2018 ferritin was 145.6 ng/mL and a hemoglobin 10.2..  There is no iron labs in 2 3/8/2021 as mentioned above.     The  reports that the patient recently had positive stool occult blood test. She is scheduled to see Cleveland Devine MD, and having both EGD and colonoscopy in the middle of 05/2021.  Her  also reports the patient was taking oral iron for about 3 years, and was recently discontinued because of elevated ferritin. The patient has chronic fatigue. She reports no visible blood per rectum, no melena. She denies dizziness or fainting.     Recent laboratory study on 03/09/2021 reported ferritin 794 ng/mL, chemistry lab reported creatinine 1.23 with normal electrolytes and glucose.  Laboratory studies on 03/08/2021 serum iron was 57, TIBC 346 and iron saturation 16% together with super therapeutic folate more than 20 ng/mL and vitamin B12 at 1018 pg/mL. Her hemoglobin was 10.7 and hematocrit 32.9.    Laboratory study on 4/9/2020 reported normal hemoglobin 12.5, MCV 93.6, MCHC 33.1 and hematocrit 37.8%. She has normal WBC 9490, including ANC 6520, lymphocytes 2270 and normal platelets 190,000.     I reviewed her peripheral blood smear, unremarkable, no evidence of hematologic malignancy.  Morphology of RBC is normal. No target cells, no schistocytes, no angie cells. Morphology of WBC and platelets also normal.           MEDICATIONS    Current Outpatient Medications:   •  albuterol (PROVENTIL) (2.5 MG/3ML) 0.083% nebulizer solution, INHALE 1 VIAL BY MOUTH EVERY 4 HOURS AS NEEDED FOR WHEEZING, Disp: 150 mL, Rfl: 3  •  albuterol sulfate  (90 Base) MCG/ACT inhaler, Inhale 2 puffs Every 4 (Four) Hours As Needed for Wheezing., Disp: 18 g, Rfl: 3  •  aspirin 81 MG EC tablet, Take 1 tablet by mouth Daily., Disp: , Rfl:   •  atorvastatin (LIPITOR) 40 MG tablet, TAKE 1 TABLET BY MOUTH EVERY DAY, Disp: 90 tablet, Rfl: 2  •  carvedilol (COREG) 6.25 MG tablet, TAKE 1 TABLET BY MOUTH TWICE A DAY WITH MEALS, Disp: 180 tablet, Rfl: 2  •  cyclobenzaprine (FLEXERIL) 10 MG tablet, TAKE 1 TABLET BY MOUTH AT BEDTIME, Disp: 90 tablet, Rfl: 3  •  ferrous sulfate 325 (65 FE) MG tablet, TAKE 1 TABLET BY MOUTH TWICE A DAY WITH MEALS**NC BY INS**, Disp: 180 tablet, Rfl: 1  •  fluconazole (DIFLUCAN) 150 MG tablet, 1 p.o. as needed yeast day 1, 4, 7, Disp: 3 tablet, Rfl: 0  •  fluticasone-salmeterol (Advair Diskus) 250-50 MCG/DOSE DISKUS, Inhale 1 puff 2 (Two) Times a Day., Disp: 3 each, Rfl: 3  •  furosemide (LASIX) 40 MG tablet, Take 1 tablet by mouth Daily., Disp: 30 tablet, Rfl: 3  •  guaiFENesin (MUCINEX) 600 MG 12 hr tablet, Take 600 mg by mouth 2 (Two) Times a Day., Disp: , Rfl:   •  HYDROcodone-acetaminophen (NORCO) 5-325 MG per tablet, Take 1 tablet by mouth Every 8 (Eight) Hours As Needed for Moderate Pain . Chronic pain medicine for low back pain, Disp: 90 tablet, Rfl: 0  •  loratadine (Claritin) 10 MG tablet, Take 10 mg by mouth 2 (two) times a day., Disp: , Rfl:   •  magnesium oxide (MAG-OX) 400 MG tablet, TAKE 1 TABLET BY MOUTH EVERY DAY, Disp: 90 tablet, Rfl: 1  •  meclizine (ANTIVERT) 25 MG tablet, Take 1 tablet by mouth 3 (Three) Times a Day As Needed for Dizziness. prn, Disp: 30 tablet, Rfl: 3  •   Multiple Vitamins-Minerals (MULTIVITAL) tablet, Take 1 tablet by mouth Daily., Disp: , Rfl:   •  Nebulizer device, 1 Units 4 (Four) Times a Day As Needed (Wheezing). J44.1 j20.8, Disp: 1 each, Rfl: 0  •  O2 (OXYGEN), Inhale 3 L/min Continuous., Disp: , Rfl:   •  Omega-3 Fatty Acids (fish oil) 1000 MG capsule capsule, Take  by mouth Every Night., Disp: , Rfl:   •  omeprazole (priLOSEC) 40 MG capsule, TAKE 1 CAPSULE BY MOUTH EVERY DAY, Disp: 90 capsule, Rfl: 1  •  ondansetron (ZOFRAN) 4 MG tablet, Take 1 tablet by mouth Every 12 (Twelve) Hours As Needed for Nausea or Vomiting., Disp: 90 tablet, Rfl: 3  •  polyethylene glycol (MIRALAX) packet, Take 17 g by mouth 2 (Two) Times a Day. (Patient taking differently: Take 17 g by mouth 2 (Two) Times a Day As Needed.), Disp: , Rfl:   •  potassium chloride 10 MEQ CR tablet, 1 p.o. daily, Disp: 30 tablet, Rfl: 5  •  roflumilast (DALIRESP) 500 MCG tablet tablet, Take 1 tablet by mouth Daily., Disp: 90 tablet, Rfl: 0  •  rOPINIRole (REQUIP) 2 MG tablet, TAKE 1 TABLET BY MOUTH EVERY DAY EVERY NIGHT, Disp: 90 tablet, Rfl: 2  •  sertraline (ZOLOFT) 100 MG tablet, TAKE 1 TABLET BY MOUTH EVERY DAY, Disp: 90 tablet, Rfl: 1  •  tiotropium (Spiriva HandiHaler) 18 MCG per inhalation capsule, Place 1 capsule into inhaler and inhale Daily., Disp: 90 capsule, Rfl: 3  •  valACYclovir (Valtrex) 1000 MG tablet, 1 p.o. 3 times daily x1 week, Disp: 21 tablet, Rfl: 0  •  zaleplon (SONATA) 10 MG capsule, TAKE 1 CAPSULE BY MOUTH EVERY DAY AT NIGHT, Disp: 30 capsule, Rfl: 3    ALLERGIES:     Allergies   Allergen Reactions   • Bupropion Itching   • Cephalexin Itching     Tolerated piperacillin/tazobactam   • Metoprolol Itching       SOCIAL HISTORY:       Social History     Socioeconomic History   • Marital status:      Spouse name: Not on file   • Number of children: 2   • Years of education: Not on file   • Highest education level: Not on file   Tobacco Use   • Smoking status: Former Smoker     " Quit date:      Years since quittin.4   • Smokeless tobacco: Never Used   Substance and Sexual Activity   • Alcohol use: No     Comment: caffeine use   • Drug use: No   • Sexual activity: Defer         FAMILY HISTORY:  Family History   Adopted: Yes   Problem Relation Age of Onset   • No Known Problems Mother    • No Known Problems Father    · Adopted.  No details.  Only brother  of COVID in his 80's.  Mother probably had TB when giving birth to her.     REVIEW OF SYSTEMS:  Review of Systems   Constitutional: Positive for activity change (Due to exertional dyspnea) and fatigue. Negative for appetite change, diaphoresis, fever and unexpected weight change.   HENT: Positive for dental problem (Has dentures) and hearing loss. Negative for nosebleeds and sore throat.         Dry mouth   Eyes: Negative for photophobia and visual disturbance.   Respiratory: Positive for shortness of breath (On home O2 3 L/min). Negative for cough and wheezing.    Cardiovascular: Positive for chest pain.   Gastrointestinal: Negative for abdominal pain, anal bleeding, blood in stool (Stool occult blood test was positive, however no visible melena hematochezia), nausea and vomiting.   Endocrine: Negative for cold intolerance and heat intolerance.   Genitourinary: Negative for dysuria and hematuria.   Musculoskeletal: Positive for back pain. Negative for arthralgias and joint swelling.   Skin: Negative for color change, pallor and rash.        Dry skin and pruritus   Neurological: Positive for dizziness (Vertigo), numbness and headaches.   Hematological: Negative for adenopathy. Bruises/bleeds easily.   Psychiatric/Behavioral: Negative for agitation.            Vitals:    21 1537   BP: 175/84   Pulse: 93   Resp: 20   Temp: 98.2 °F (36.8 °C)   TempSrc: Temporal   SpO2: 93%   Weight: 63.2 kg (139 lb 4.8 oz)   Height: 170.2 cm (67.01\")   PainSc: 0-No pain   ECOG 1      PHYSICAL EXAM:    GENERAL:  Well-developed, well-nourished " female, in no acute distress.  Patient sits in wheelchair.  SKIN:  Warm, dry without rashes, purpura or petechiae.  HEAD:  Normocephalic.  EYES:  Conjunctivae normal.  EARS:  Hearing intact.  NOSE:  Patient wears mask due to the pandemic coronavirus infection.   NECK:  Supple; no thyromegaly or masses.  LYMPHATICS:  No cervical, supraclavicular adenopathy.  CHEST: Normal respiratory effort.  Lungs clear to auscultation. Good airflow.  CARDIAC:  Regular rate and rhythm without murmurs. Normal S1,S2.  ABDOMEN:  Soft, nontender with no organomegaly or masses.  Bowel sounds normal.  EXTREMITIES:  No clubbing, cyanosis or edema.  NEUROLOGICAL:  Cranial Nerves II-XII grossly intact.   PSYCHIATRIC:  Normal affect and mood.          RECENT LABS:  WBC   Date Value Ref Range Status   06/04/2021 10.42 3.40 - 10.80 10*3/mm3 Final   03/30/2021 7.07 3.40 - 10.80 10*3/mm3 Final     RBC   Date Value Ref Range Status   06/04/2021 3.33 (L) 3.77 - 5.28 10*6/mm3 Final   03/30/2021 3.71 (L) 3.77 - 5.28 10*6/mm3 Final     Hemoglobin   Date Value Ref Range Status   06/04/2021 10.1 (L) 12.0 - 15.9 g/dL Final     Hematocrit   Date Value Ref Range Status   06/04/2021 32.1 (L) 34.0 - 46.6 % Final     MCV   Date Value Ref Range Status   06/04/2021 96.4 79.0 - 97.0 fL Final     MCH   Date Value Ref Range Status   06/04/2021 30.3 26.6 - 33.0 pg Final     MCHC   Date Value Ref Range Status   06/04/2021 31.5 31.5 - 35.7 g/dL Final     RDW   Date Value Ref Range Status   06/04/2021 13.6 12.3 - 15.4 % Final     RDW-SD   Date Value Ref Range Status   06/04/2021 48.0 37.0 - 54.0 fl Final     MPV   Date Value Ref Range Status   06/04/2021 9.9 6.0 - 12.0 fL Final     Platelets   Date Value Ref Range Status   06/04/2021 259 140 - 450 10*3/mm3 Final     Neutrophil %   Date Value Ref Range Status   06/04/2021 81.8 (H) 42.7 - 76.0 % Final     Lymphocyte %   Date Value Ref Range Status   06/04/2021 12.8 (L) 19.6 - 45.3 % Final     Monocyte %   Date Value Ref  Range Status   06/04/2021 4.4 (L) 5.0 - 12.0 % Final     Eosinophil %   Date Value Ref Range Status   06/04/2021 0.4 0.3 - 6.2 % Final     Basophil %   Date Value Ref Range Status   06/04/2021 0.2 0.0 - 1.5 % Final     Immature Grans %   Date Value Ref Range Status   06/04/2021 0.4 0.0 - 0.5 % Final     Neutrophils, Absolute   Date Value Ref Range Status   06/04/2021 8.53 (H) 1.70 - 7.00 10*3/mm3 Final     Lymphocytes, Absolute   Date Value Ref Range Status   06/04/2021 1.33 0.70 - 3.10 10*3/mm3 Final     Monocytes, Absolute   Date Value Ref Range Status   06/04/2021 0.46 0.10 - 0.90 10*3/mm3 Final     Eosinophils, Absolute   Date Value Ref Range Status   06/04/2021 0.04 0.00 - 0.40 10*3/mm3 Final     Basophils, Absolute   Date Value Ref Range Status   06/04/2021 0.02 0.00 - 0.20 10*3/mm3 Final     Immature Grans, Absolute   Date Value Ref Range Status   06/04/2021 0.04 0.00 - 0.05 10*3/mm3 Final     nRBC   Date Value Ref Range Status   06/04/2021 0.0 0.0 - 0.2 /100 WBC Final     Lab Results   Component Value Date    IRON 62 05/12/2021    TIBC 258 (L) 05/12/2021    FERRITIN 718.00 (H) 05/18/2021     Iron studies pending 6/4/2021    Lab Results   Component Value Date    GLUCOSE 102 (H) 05/20/2021    BUN 12 05/20/2021    CREATININE 0.83 05/20/2021    EGFRIFNONA 69 05/20/2021    BCR 14.5 05/20/2021    K 3.5 05/20/2021    CO2 30.8 (H) 05/20/2021    CALCIUM 8.8 05/20/2021    ALBUMIN 3.90 05/20/2021    AST 19 05/20/2021    ALT 17 05/20/2021         Assessment/Plan   Anemia with elevated ferritin.    · Patient is a 65 years old female with history of iron deficiency anemia and GI bleeding in 2017, for which she has been taking oral iron for the past 2-3 years according to her .   Since that time she has multiple iron studies with gradually improving ferritin level.   · On 3/9/2021 when she had significant elevated ferritin 794 ng/mL, with borderline low iron saturation 16% and anemia Hb 10.7.   · Discussed with the  patient and her , this patient has no evidence of iron deficiency at this point based on her recent iron study on 03/09/2021 and 03/08/2021.  Instead her elevated ferritin indicates that she has anemia likely due to chronic inflammatory disease.  At the meantime, she had super therapeutic B12 level and folic acid level. She is taking oral multivitamin.   · This patient also has mild renal insufficiency, by calculation her creatinine clearance falls in the category of stage III chronic renal insufficiency.  Nevertheless patient is not a candidate for erythropoietin treatment.   · Patient had a normal hemoglobin 12.5 on 4/9/2020  · She had hospitalization in May 2021 because of hyperkalemia.  During hospitalization, her hemoglobin dora was 10.8 on 5/11/2021.  · The patient reports positive occult blood from stool sample recently.    · Patient had EGD and colonoscopy examination on 5/17/2021.  No apparent bleeding.  Biopsy showed esophagitis otherwise unremarkable.    · Laboratory study today on 6/4/2021 reported improved hemoglobin however still anemic with Hb 10.1.  Iron studies reported ferritin 395 and iron saturation 11% with free iron 33 TIBC 291.  Chemistry lab reported much improved renal function with creatinine 0.88.  Her iron study fits with anemia of chronic inflammatory disease.      PLAN:   1. No need for oral iron treatment for now.   2. I will bring the patient back for reevaluation in 2 months,  repeating iron study and CBC.     Discussed with the patient and she voiced understanding.       MATTHIAS NAIR M.D., Ph.D.    6/4/2021    CC:   MD Cleveland Johnston M.D.     Ag Melo MD      Dictated using Dragon Dictation.

## 2021-06-04 NOTE — TELEPHONE ENCOUNTER
[facility] returning call to let our office know that pt was seen in their office and they sent pt up to our office to see if we would be able to get him seen today. Told [facility] it's not likely that we would be able to see pt today but he could see the schedulers to scheduled an appt.

## 2021-06-04 NOTE — TELEPHONE ENCOUNTER
Attempted to return call to Yenny at Dr. Melo's office. Unable to come to the phone left phone number to return call.

## 2021-06-07 ENCOUNTER — TELEPHONE (OUTPATIENT)
Dept: FAMILY MEDICINE CLINIC | Facility: CLINIC | Age: 66
End: 2021-06-07

## 2021-06-07 RX ORDER — AMLODIPINE BESYLATE 10 MG/1
10 TABLET ORAL DAILY
Qty: 30 TABLET | Refills: 3 | Status: SHIPPED | OUTPATIENT
Start: 2021-06-07 | End: 2021-06-09

## 2021-06-07 RX ORDER — BENZONATATE 100 MG/1
CAPSULE ORAL
Qty: 180 CAPSULE | Refills: 1 | Status: SHIPPED | OUTPATIENT
Start: 2021-06-07

## 2021-06-07 NOTE — TELEPHONE ENCOUNTER
Called in amlodipine 10 mg daily, recommend continue Coreg 6.25 mg twice daily, check blood pressure and follow-up in 1 month

## 2021-06-07 NOTE — TELEPHONE ENCOUNTER
Patient stated b/p 200/100,175/90  she said went to hosp./lung doc. Both agree b/p medication needs to be increased form 0.625 bid

## 2021-06-07 NOTE — TELEPHONE ENCOUNTER
PATIENT STATES SHE HAD HER LUNGS CHECKED AND THEY ARE FUNCTIONING VERY WELL. SHE WAS ADVISED TO HAVE HER BLOOD PRESSURE MEDICATION INCREASED. VERIFIED [facility] ON [address].    PLEASE ADVISE: [phone]

## 2021-06-09 ENCOUNTER — TELEPHONE (OUTPATIENT)
Dept: ONCOLOGY | Facility: CLINIC | Age: 66
End: 2021-06-09

## 2021-06-09 ENCOUNTER — TELEPHONE (OUTPATIENT)
Dept: ONCOLOGY | Facility: HOSPITAL | Age: 66
End: 2021-06-09

## 2021-06-09 DIAGNOSIS — D50.9 IRON DEFICIENCY ANEMIA, UNSPECIFIED IRON DEFICIENCY ANEMIA TYPE: Primary | ICD-10-CM

## 2021-06-09 LAB
ALBUMIN SERPL-MCNC: 4.4 G/DL (ref 3.5–5.2)
ALBUMIN/GLOB SERPL: 1.8 G/DL (ref 1.1–2.4)
ALP SERPL-CCNC: 76 U/L (ref 38–116)
ALT SERPL W P-5'-P-CCNC: 36 U/L (ref 0–33)
ANION GAP SERPL CALCULATED.3IONS-SCNC: 15 MMOL/L (ref 5–15)
AST SERPL-CCNC: 29 U/L (ref 0–32)
BILIRUB SERPL-MCNC: 0.6 MG/DL (ref 0.2–1.2)
BUN SERPL-MCNC: 14 MG/DL (ref 6–20)
BUN/CREAT SERPL: 15.7 (ref 7.3–30)
CALCIUM SPEC-SCNC: 9.3 MG/DL (ref 8.5–10.2)
CHLORIDE SERPL-SCNC: 100 MMOL/L (ref 98–107)
CO2 SERPL-SCNC: 27 MMOL/L (ref 22–29)
CREAT SERPL-MCNC: 0.89 MG/DL (ref 0.6–1.1)
GFR SERPL CREATININE-BSD FRML MDRD: 63 ML/MIN/1.73
GLOBULIN UR ELPH-MCNC: 2.4 GM/DL (ref 1.8–3.5)
GLUCOSE SERPL-MCNC: 81 MG/DL (ref 74–124)
POTASSIUM SERPL-SCNC: 3.9 MMOL/L (ref 3.5–4.7)
PROT SERPL-MCNC: 6.8 G/DL (ref 6.3–8)
SODIUM SERPL-SCNC: 142 MMOL/L (ref 134–145)

## 2021-06-09 RX ORDER — AMLODIPINE BESYLATE 10 MG/1
TABLET ORAL
Qty: 90 TABLET | Refills: 1 | Status: SHIPPED | OUTPATIENT
Start: 2021-06-09 | End: 2021-11-25

## 2021-06-09 NOTE — TELEPHONE ENCOUNTER
"Pt's  reports pt has hx of hepatitis B and is looking \"a little yellow\". He is asking if liver enzymes were checked last week and if not can they be added on. Lab states they can pull her specimen and run it if MD approves. Okay per Dr. Shin, order for CMP placed. Pt's  understands that if numbers are elevated he will need to contact pt's PCP for management of this.  "

## 2021-06-09 NOTE — TELEPHONE ENCOUNTER
He wants to know if we did labs to check her liver when she was here and if not can we use the blood to do that

## 2021-06-14 ENCOUNTER — APPOINTMENT (OUTPATIENT)
Dept: GENERAL RADIOLOGY | Facility: HOSPITAL | Age: 66
End: 2021-06-14

## 2021-06-14 ENCOUNTER — TELEPHONE (OUTPATIENT)
Dept: FAMILY MEDICINE CLINIC | Facility: CLINIC | Age: 66
End: 2021-06-14

## 2021-06-14 ENCOUNTER — HOSPITAL ENCOUNTER (INPATIENT)
Facility: HOSPITAL | Age: 66
LOS: 4 days | Discharge: HOME | End: 2021-06-18
Attending: EMERGENCY MEDICINE | Admitting: INTERNAL MEDICINE

## 2021-06-14 DIAGNOSIS — R53.1 GENERALIZED WEAKNESS: ICD-10-CM

## 2021-06-14 DIAGNOSIS — R06.02 SHORTNESS OF BREATH: ICD-10-CM

## 2021-06-14 DIAGNOSIS — J44.1 COPD EXACERBATION (HCC): Primary | ICD-10-CM

## 2021-06-14 DIAGNOSIS — E87.6 HYPOKALEMIA: ICD-10-CM

## 2021-06-14 DIAGNOSIS — B34.2 CORONAVIRUS INFECTION: ICD-10-CM

## 2021-06-14 LAB
ALBUMIN SERPL-MCNC: 4.3 G/DL (ref 3.5–5.2)
ALBUMIN/GLOB SERPL: 1.8 G/DL
ALP SERPL-CCNC: 79 U/L (ref 39–117)
ALT SERPL W P-5'-P-CCNC: 14 U/L (ref 1–33)
ANION GAP SERPL CALCULATED.3IONS-SCNC: 11.6 MMOL/L (ref 5–15)
AST SERPL-CCNC: 21 U/L (ref 1–32)
B PARAPERT DNA SPEC QL NAA+PROBE: NOT DETECTED
B PERT DNA SPEC QL NAA+PROBE: NOT DETECTED
BASOPHILS # BLD AUTO: 0.01 10*3/MM3 (ref 0–0.2)
BASOPHILS NFR BLD AUTO: 0.1 % (ref 0–1.5)
BILIRUB SERPL-MCNC: 1.1 MG/DL (ref 0–1.2)
BUN SERPL-MCNC: 11 MG/DL (ref 8–23)
BUN/CREAT SERPL: 13.3 (ref 7–25)
C PNEUM DNA NPH QL NAA+NON-PROBE: NOT DETECTED
CALCIUM SPEC-SCNC: 9.1 MG/DL (ref 8.6–10.5)
CHLORIDE SERPL-SCNC: 96 MMOL/L (ref 98–107)
CO2 SERPL-SCNC: 31.4 MMOL/L (ref 22–29)
CREAT SERPL-MCNC: 0.83 MG/DL (ref 0.57–1)
D DIMER PPP FEU-MCNC: 0.71 MCGFEU/ML (ref 0–0.49)
D-LACTATE SERPL-SCNC: 1.2 MMOL/L (ref 0.5–2)
DEPRECATED RDW RBC AUTO: 43.7 FL (ref 37–54)
EOSINOPHIL # BLD AUTO: 0.01 10*3/MM3 (ref 0–0.4)
EOSINOPHIL NFR BLD AUTO: 0.1 % (ref 0.3–6.2)
ERYTHROCYTE [DISTWIDTH] IN BLOOD BY AUTOMATED COUNT: 13.7 % (ref 12.3–15.4)
FLUAV SUBTYP SPEC NAA+PROBE: NOT DETECTED
FLUBV RNA ISLT QL NAA+PROBE: NOT DETECTED
GFR SERPL CREATININE-BSD FRML MDRD: 68 ML/MIN/1.73
GLOBULIN UR ELPH-MCNC: 2.4 GM/DL
GLUCOSE BLDC GLUCOMTR-MCNC: 164 MG/DL (ref 70–130)
GLUCOSE SERPL-MCNC: 127 MG/DL (ref 65–99)
HADV DNA SPEC NAA+PROBE: NOT DETECTED
HCOV 229E RNA SPEC QL NAA+PROBE: NOT DETECTED
HCOV HKU1 RNA SPEC QL NAA+PROBE: NOT DETECTED
HCOV NL63 RNA SPEC QL NAA+PROBE: DETECTED
HCOV OC43 RNA SPEC QL NAA+PROBE: NOT DETECTED
HCT VFR BLD AUTO: 29.7 % (ref 34–46.6)
HGB BLD-MCNC: 9.8 G/DL (ref 12–15.9)
HMPV RNA NPH QL NAA+NON-PROBE: NOT DETECTED
HPIV1 RNA SPEC QL NAA+PROBE: NOT DETECTED
HPIV2 RNA SPEC QL NAA+PROBE: NOT DETECTED
HPIV3 RNA NPH QL NAA+PROBE: NOT DETECTED
HPIV4 P GENE NPH QL NAA+PROBE: NOT DETECTED
INR PPP: 1.09 (ref 0.9–1.1)
LYMPHOCYTES # BLD AUTO: 0.31 10*3/MM3 (ref 0.7–3.1)
LYMPHOCYTES NFR BLD AUTO: 4 % (ref 19.6–45.3)
M PNEUMO IGG SER IA-ACNC: NOT DETECTED
MAGNESIUM SERPL-MCNC: 1.5 MG/DL (ref 1.6–2.4)
MCH RBC QN AUTO: 29 PG (ref 26.6–33)
MCHC RBC AUTO-ENTMCNC: 33 G/DL (ref 31.5–35.7)
MCV RBC AUTO: 87.9 FL (ref 79–97)
MONOCYTES # BLD AUTO: 0.19 10*3/MM3 (ref 0.1–0.9)
MONOCYTES NFR BLD AUTO: 2.4 % (ref 5–12)
NEUTROPHILS NFR BLD AUTO: 7.2 10*3/MM3 (ref 1.7–7)
NEUTROPHILS NFR BLD AUTO: 92.9 % (ref 42.7–76)
NT-PROBNP SERPL-MCNC: 3638 PG/ML (ref 0–900)
PLATELET # BLD AUTO: 179 10*3/MM3 (ref 140–450)
PMV BLD AUTO: 11 FL (ref 6–12)
POTASSIUM SERPL-SCNC: 2.8 MMOL/L (ref 3.5–5.2)
PROCALCITONIN SERPL-MCNC: 0.32 NG/ML (ref 0–0.25)
PROT SERPL-MCNC: 6.7 G/DL (ref 6–8.5)
PROTHROMBIN TIME: 13.9 SECONDS (ref 11.7–14.2)
QT INTERVAL: 435 MS
RBC # BLD AUTO: 3.38 10*6/MM3 (ref 3.77–5.28)
RHINOVIRUS RNA SPEC NAA+PROBE: NOT DETECTED
RSV RNA NPH QL NAA+NON-PROBE: NOT DETECTED
SARS-COV-2 RNA NPH QL NAA+NON-PROBE: NOT DETECTED
SODIUM SERPL-SCNC: 139 MMOL/L (ref 136–145)
TROPONIN T SERPL-MCNC: 0.02 NG/ML (ref 0–0.03)
WBC # BLD AUTO: 7.76 10*3/MM3 (ref 3.4–10.8)

## 2021-06-14 PROCEDURE — 83605 ASSAY OF LACTIC ACID: CPT | Performed by: EMERGENCY MEDICINE

## 2021-06-14 PROCEDURE — 83735 ASSAY OF MAGNESIUM: CPT | Performed by: EMERGENCY MEDICINE

## 2021-06-14 PROCEDURE — 80053 COMPREHEN METABOLIC PANEL: CPT | Performed by: EMERGENCY MEDICINE

## 2021-06-14 PROCEDURE — 84484 ASSAY OF TROPONIN QUANT: CPT | Performed by: EMERGENCY MEDICINE

## 2021-06-14 PROCEDURE — 87205 SMEAR GRAM STAIN: CPT | Performed by: EMERGENCY MEDICINE

## 2021-06-14 PROCEDURE — 85025 COMPLETE CBC W/AUTO DIFF WBC: CPT | Performed by: EMERGENCY MEDICINE

## 2021-06-14 PROCEDURE — 0202U NFCT DS 22 TRGT SARS-COV-2: CPT | Performed by: EMERGENCY MEDICINE

## 2021-06-14 PROCEDURE — 85379 FIBRIN DEGRADATION QUANT: CPT | Performed by: EMERGENCY MEDICINE

## 2021-06-14 PROCEDURE — 84145 PROCALCITONIN (PCT): CPT | Performed by: EMERGENCY MEDICINE

## 2021-06-14 PROCEDURE — 87186 SC STD MICRODIL/AGAR DIL: CPT | Performed by: EMERGENCY MEDICINE

## 2021-06-14 PROCEDURE — 94640 AIRWAY INHALATION TREATMENT: CPT

## 2021-06-14 PROCEDURE — 87077 CULTURE AEROBIC IDENTIFY: CPT | Performed by: EMERGENCY MEDICINE

## 2021-06-14 PROCEDURE — 87040 BLOOD CULTURE FOR BACTERIA: CPT | Performed by: EMERGENCY MEDICINE

## 2021-06-14 PROCEDURE — 93005 ELECTROCARDIOGRAM TRACING: CPT | Performed by: EMERGENCY MEDICINE

## 2021-06-14 PROCEDURE — 87070 CULTURE OTHR SPECIMN AEROBIC: CPT | Performed by: EMERGENCY MEDICINE

## 2021-06-14 PROCEDURE — 71045 X-RAY EXAM CHEST 1 VIEW: CPT

## 2021-06-14 PROCEDURE — 85610 PROTHROMBIN TIME: CPT | Performed by: EMERGENCY MEDICINE

## 2021-06-14 PROCEDURE — 87150 DNA/RNA AMPLIFIED PROBE: CPT | Performed by: EMERGENCY MEDICINE

## 2021-06-14 PROCEDURE — 93010 ELECTROCARDIOGRAM REPORT: CPT | Performed by: INTERNAL MEDICINE

## 2021-06-14 PROCEDURE — 83880 ASSAY OF NATRIURETIC PEPTIDE: CPT | Performed by: EMERGENCY MEDICINE

## 2021-06-14 PROCEDURE — 99285 EMERGENCY DEPT VISIT HI MDM: CPT

## 2021-06-14 PROCEDURE — 82962 GLUCOSE BLOOD TEST: CPT

## 2021-06-14 PROCEDURE — 94799 UNLISTED PULMONARY SVC/PX: CPT

## 2021-06-14 RX ORDER — MAGNESIUM SULFATE HEPTAHYDRATE 40 MG/ML
4 INJECTION, SOLUTION INTRAVENOUS AS NEEDED
Status: DISCONTINUED | OUTPATIENT
Start: 2021-06-14 | End: 2021-06-18 | Stop reason: HOSPADM

## 2021-06-14 RX ORDER — IPRATROPIUM BROMIDE AND ALBUTEROL SULFATE 2.5; .5 MG/3ML; MG/3ML
3 SOLUTION RESPIRATORY (INHALATION)
Status: DISCONTINUED | OUTPATIENT
Start: 2021-06-14 | End: 2021-06-18 | Stop reason: HOSPADM

## 2021-06-14 RX ORDER — POTASSIUM CHLORIDE 1.5 G/1.77G
40 POWDER, FOR SOLUTION ORAL AS NEEDED
Status: DISCONTINUED | OUTPATIENT
Start: 2021-06-14 | End: 2021-06-18 | Stop reason: HOSPADM

## 2021-06-14 RX ORDER — MAGNESIUM SULFATE 1 G/100ML
1 INJECTION INTRAVENOUS AS NEEDED
Status: DISCONTINUED | OUTPATIENT
Start: 2021-06-14 | End: 2021-06-18 | Stop reason: HOSPADM

## 2021-06-14 RX ORDER — POTASSIUM CHLORIDE 750 MG/1
10 TABLET, FILM COATED, EXTENDED RELEASE ORAL DAILY
Status: DISCONTINUED | OUTPATIENT
Start: 2021-06-14 | End: 2021-06-14

## 2021-06-14 RX ORDER — HYDROCODONE BITARTRATE AND ACETAMINOPHEN 5; 325 MG/1; MG/1
1 TABLET ORAL EVERY 8 HOURS PRN
Status: DISCONTINUED | OUTPATIENT
Start: 2021-06-14 | End: 2021-06-18 | Stop reason: HOSPADM

## 2021-06-14 RX ORDER — INSULIN LISPRO 100 [IU]/ML
0-9 INJECTION, SOLUTION INTRAVENOUS; SUBCUTANEOUS
Status: DISCONTINUED | OUTPATIENT
Start: 2021-06-15 | End: 2021-06-18 | Stop reason: HOSPADM

## 2021-06-14 RX ORDER — POTASSIUM CHLORIDE 750 MG/1
40 TABLET, FILM COATED, EXTENDED RELEASE ORAL AS NEEDED
Status: DISCONTINUED | OUTPATIENT
Start: 2021-06-14 | End: 2021-06-18 | Stop reason: HOSPADM

## 2021-06-14 RX ORDER — POTASSIUM CHLORIDE 750 MG/1
40 TABLET, FILM COATED, EXTENDED RELEASE ORAL ONCE
Status: COMPLETED | OUTPATIENT
Start: 2021-06-14 | End: 2021-06-14

## 2021-06-14 RX ORDER — METHYLPREDNISOLONE SODIUM SUCCINATE 40 MG/ML
40 INJECTION, POWDER, LYOPHILIZED, FOR SOLUTION INTRAMUSCULAR; INTRAVENOUS EVERY 12 HOURS
Status: DISCONTINUED | OUTPATIENT
Start: 2021-06-15 | End: 2021-06-17

## 2021-06-14 RX ORDER — MECLIZINE HYDROCHLORIDE 25 MG/1
25 TABLET ORAL 3 TIMES DAILY PRN
Status: DISCONTINUED | OUTPATIENT
Start: 2021-06-14 | End: 2021-06-18 | Stop reason: HOSPADM

## 2021-06-14 RX ORDER — METHYLPREDNISOLONE SODIUM SUCCINATE 40 MG/ML
40 INJECTION, POWDER, LYOPHILIZED, FOR SOLUTION INTRAMUSCULAR; INTRAVENOUS EVERY 8 HOURS
Status: DISCONTINUED | OUTPATIENT
Start: 2021-06-14 | End: 2021-06-14

## 2021-06-14 RX ORDER — ATORVASTATIN CALCIUM 20 MG/1
40 TABLET, FILM COATED ORAL DAILY
Status: DISCONTINUED | OUTPATIENT
Start: 2021-06-14 | End: 2021-06-18 | Stop reason: HOSPADM

## 2021-06-14 RX ORDER — MAGNESIUM SULFATE HEPTAHYDRATE 40 MG/ML
2 INJECTION, SOLUTION INTRAVENOUS AS NEEDED
Status: DISCONTINUED | OUTPATIENT
Start: 2021-06-14 | End: 2021-06-18 | Stop reason: HOSPADM

## 2021-06-14 RX ORDER — POTASSIUM CHLORIDE 7.45 MG/ML
10 INJECTION INTRAVENOUS
Status: DISCONTINUED | OUTPATIENT
Start: 2021-06-14 | End: 2021-06-18 | Stop reason: HOSPADM

## 2021-06-14 RX ORDER — CETIRIZINE HYDROCHLORIDE 10 MG/1
10 TABLET ORAL DAILY
Status: DISCONTINUED | OUTPATIENT
Start: 2021-06-14 | End: 2021-06-18 | Stop reason: HOSPADM

## 2021-06-14 RX ORDER — ROFLUMILAST 500 UG/1
500 TABLET ORAL DAILY
Status: DISCONTINUED | OUTPATIENT
Start: 2021-06-14 | End: 2021-06-18 | Stop reason: HOSPADM

## 2021-06-14 RX ORDER — FUROSEMIDE 10 MG/ML
40 INJECTION INTRAMUSCULAR; INTRAVENOUS
Status: COMPLETED | OUTPATIENT
Start: 2021-06-14 | End: 2021-06-15

## 2021-06-14 RX ORDER — CARVEDILOL 6.25 MG/1
6.25 TABLET ORAL 2 TIMES DAILY WITH MEALS
Status: DISCONTINUED | OUTPATIENT
Start: 2021-06-14 | End: 2021-06-18 | Stop reason: HOSPADM

## 2021-06-14 RX ORDER — FUROSEMIDE 20 MG/1
40 TABLET ORAL DAILY
Status: DISCONTINUED | OUTPATIENT
Start: 2021-06-14 | End: 2021-06-14

## 2021-06-14 RX ORDER — POLYETHYLENE GLYCOL 3350 17 G/17G
17 POWDER, FOR SOLUTION ORAL 2 TIMES DAILY PRN
Status: DISCONTINUED | OUTPATIENT
Start: 2021-06-14 | End: 2021-06-18 | Stop reason: HOSPADM

## 2021-06-14 RX ORDER — MAGNESIUM OXIDE 400 MG/1
400 TABLET ORAL DAILY
Status: DISCONTINUED | OUTPATIENT
Start: 2021-06-14 | End: 2021-06-18 | Stop reason: HOSPADM

## 2021-06-14 RX ORDER — AMLODIPINE BESYLATE 5 MG/1
10 TABLET ORAL DAILY
Status: DISCONTINUED | OUTPATIENT
Start: 2021-06-14 | End: 2021-06-18 | Stop reason: HOSPADM

## 2021-06-14 RX ORDER — ONDANSETRON 4 MG/1
4 TABLET, FILM COATED ORAL EVERY 12 HOURS PRN
Status: DISCONTINUED | OUTPATIENT
Start: 2021-06-14 | End: 2021-06-18 | Stop reason: HOSPADM

## 2021-06-14 RX ORDER — ZOLPIDEM TARTRATE 5 MG/1
5 TABLET ORAL NIGHTLY PRN
Status: DISCONTINUED | OUTPATIENT
Start: 2021-06-14 | End: 2021-06-18 | Stop reason: HOSPADM

## 2021-06-14 RX ORDER — ASPIRIN 81 MG/1
81 TABLET ORAL DAILY
Status: DISCONTINUED | OUTPATIENT
Start: 2021-06-14 | End: 2021-06-18 | Stop reason: HOSPADM

## 2021-06-14 RX ORDER — IPRATROPIUM BROMIDE AND ALBUTEROL SULFATE 2.5; .5 MG/3ML; MG/3ML
3 SOLUTION RESPIRATORY (INHALATION) ONCE
Status: COMPLETED | OUTPATIENT
Start: 2021-06-14 | End: 2021-06-14

## 2021-06-14 RX ORDER — ZALEPLON 10 MG/1
CAPSULE ORAL
Qty: 30 CAPSULE | Refills: 3 | Status: SHIPPED | OUTPATIENT
Start: 2021-06-14 | End: 2021-11-18

## 2021-06-14 RX ORDER — GUAIFENESIN 600 MG/1
600 TABLET, EXTENDED RELEASE ORAL 2 TIMES DAILY
Status: DISCONTINUED | OUTPATIENT
Start: 2021-06-14 | End: 2021-06-18 | Stop reason: HOSPADM

## 2021-06-14 RX ORDER — HEPARIN SODIUM 5000 [USP'U]/ML
5000 INJECTION, SOLUTION INTRAVENOUS; SUBCUTANEOUS EVERY 8 HOURS SCHEDULED
Status: DISCONTINUED | OUTPATIENT
Start: 2021-06-14 | End: 2021-06-18 | Stop reason: HOSPADM

## 2021-06-14 RX ORDER — ROPINIROLE 2 MG/1
2 TABLET, FILM COATED ORAL NIGHTLY
Status: DISCONTINUED | OUTPATIENT
Start: 2021-06-14 | End: 2021-06-18 | Stop reason: HOSPADM

## 2021-06-14 RX ORDER — OXYCODONE HYDROCHLORIDE AND ACETAMINOPHEN 5; 325 MG/1; MG/1
2 TABLET ORAL ONCE
Status: COMPLETED | OUTPATIENT
Start: 2021-06-14 | End: 2021-06-14

## 2021-06-14 RX ORDER — PANTOPRAZOLE SODIUM 40 MG/1
40 TABLET, DELAYED RELEASE ORAL EVERY MORNING
Refills: 1 | Status: DISCONTINUED | OUTPATIENT
Start: 2021-06-15 | End: 2021-06-18 | Stop reason: HOSPADM

## 2021-06-14 RX ORDER — CYCLOBENZAPRINE HCL 10 MG
10 TABLET ORAL NIGHTLY
Status: DISCONTINUED | OUTPATIENT
Start: 2021-06-14 | End: 2021-06-18 | Stop reason: HOSPADM

## 2021-06-14 RX ADMIN — POTASSIUM CHLORIDE 40 MEQ: 750 TABLET, EXTENDED RELEASE ORAL at 18:55

## 2021-06-14 RX ADMIN — POTASSIUM CHLORIDE 40 MEQ: 750 TABLET, EXTENDED RELEASE ORAL at 23:59

## 2021-06-14 RX ADMIN — OXYCODONE HYDROCHLORIDE AND ACETAMINOPHEN 2 TABLET: 5; 325 TABLET ORAL at 19:19

## 2021-06-14 RX ADMIN — IPRATROPIUM BROMIDE AND ALBUTEROL SULFATE 3 ML: 2.5; .5 SOLUTION RESPIRATORY (INHALATION) at 19:24

## 2021-06-14 RX ADMIN — CARVEDILOL 6.25 MG: 6.25 TABLET, FILM COATED ORAL at 23:59

## 2021-06-14 RX ADMIN — CYCLOBENZAPRINE 10 MG: 10 TABLET, FILM COATED ORAL at 23:59

## 2021-06-14 RX ADMIN — GUAIFENESIN 600 MG: 600 TABLET, EXTENDED RELEASE ORAL at 23:59

## 2021-06-14 NOTE — ED NOTES
Pt alert and oriented x4. Pt c/o pain at this time. Pt states she has severe headache and aching all over. Notified MD who stated will put in orders for pain medication.      Elen Moore RN  06/14/21 [id number]

## 2021-06-14 NOTE — H&P
[facility]  [phone]  Guanako Crenshaw MD      Subjective   LOS: 0 days     66-year-old female who is a patient of Dr. Melo.  Patient reports increased cough and wheezing for the last 1 week.  Yesterday she had worsening of symptoms and came into the ED today.  She is found to have coronavirus infection which is not COVID-19.  She was noted to be wheezing in the ED.  I was asked to admit her.  She has recently seen Dr. Melo in the office.  She was apparently doing well then and on walking oximetry did not desaturate.  However she did get pretty dyspneic with exertion on her walking oximetry according to the patient.  She is on oxygen at home.  She has limited mobility due to exertional dyspnea.    Patient states that she has marked elevation in her blood pressure recently.  She states the blood pressure goes up every time she tries to walk.  Her blood pressure medicines have been recently adjusted.  She had a negative work-up with cardiology in the last 6 months.  Her prior medical history includes pulmonary hypertension with normal wedge pressure.  She reports no recent leg swelling.  Her potassium doses were adjusted because of hyperkalemia and currently she is hypokalemic.  She reports some chest pain also.    Paz Browne  reports no history of alcohol use.,  reports that she quit smoking about 14 years ago. She has never used smokeless tobacco.     Past Hx:  has a past medical history of VAN (acute kidney injury) (CMS/HCC), Anemia, Asthma, Atrial flutter (CMS/HCC), Cataract, Celiac artery stenosis (CMS/HCC), Chronic respiratory failure with hypoxia (CMS/HCC), Colon polyp, COPD (chronic obstructive pulmonary disease) (CMS/HCC), Emphysema of lung (CMS/HCC), GI bleed, Hiatal hernia, History of CHF (congestive heart failure), History of home oxygen therapy, History of mitral valve replacement with tissue graft, Hyperlipidemia, Hypertension, Infectious viral hepatitis, Intertrigo, Long term  (current) use of anticoagulants, Mitral regurgitation, PAF (paroxysmal atrial fibrillation) (CMS/HCC), Pneumonia, Pulmonary hypertension (CMS/HCC), and S/P TVR (tricuspid valve repair) (7/7/2016).  Surg Hx:  has a past surgical history that includes Mitral valve replacement; Tricuspid valve replacement; Cardiac catheterization (09/01/2014); Maze Procedure; Kidney surgery (04/22/2013); Cardiac catheterization (Left, 6/10/2016); Cardiac catheterization (N/A, 6/10/2016); Colonoscopy; Colonoscopy (N/A, 8/4/2017); Colonoscopy (N/A, 8/10/2017); Tonsillectomy; Hysterectomy; Gallbladder surgery; Hemorrhoid surgery; Esophagogastroduodenoscopy (N/A, 8/17/2017); Esophagogastroduodenoscopy (N/A, 12/22/2017); Colonoscopy (N/A, 12/22/2017); Cataract extraction; Esophagogastroduodenoscopy (N/A, 5/17/2021); and Colonoscopy (N/A, 5/17/2021).  FH: family history includes No Known Problems in her father and mother. She was adopted.  SH:  reports that she quit smoking about 14 years ago. She has never used smokeless tobacco. She reports that she does not drink alcohol and does not use drugs.    (Not in a hospital admission)    Allergies   Allergen Reactions   • Bupropion Itching   • Cephalexin Itching     Tolerated piperacillin/tazobactam   • Metoprolol Itching       Review of Systems   Constitutional: Positive for fever. Negative for chills.   HENT: Negative for congestion and sore throat.    Respiratory: Positive for cough, shortness of breath and wheezing.    Cardiovascular: Positive for chest pain. Negative for leg swelling.   Gastrointestinal: Negative for abdominal pain, nausea and vomiting.   Genitourinary: Negative for dysuria and hematuria.   Musculoskeletal: Negative for arthralgias and back pain.   Skin: Negative for pallor and rash.   Neurological: Negative for seizures and headaches.   Psychiatric/Behavioral: Negative for agitation and confusion.     Vital Signs past 24hrs  BP range: BP: (148-163)/(61-71) 163/66  Pulse  range: Heart Rate:  [87-98] 87  Resp rate range: Resp:  [18-22] 20  Temp range: Temp (24hrs), Av.7 °F (38.7 °C), Min:101.7 °F (38.7 °C), Max:101.7 °F (38.7 °C)    Oxygen range: SpO2:  [94 %-97 %] 94 %; Flow (L/min):  [3] 3;   Device (Oxygen Therapy): nasal cannula  63.5 kg (140 lb); Body mass index is 21.93 kg/m².  No intake/output data recorded.    Adult female laying in bed.  No acute distress.  On low-flow oxygen.  Pupils equal and react to light.  Oropharynx moist class III Mallampati airway.  No posterior pharyngeal discharge.  Nasopharynx without discharge septum midline.  JVP not elevated trachea midline thyroid not enlarged.  Lungs reveal bilateral air entry.  I did not hear any wheezing.  Air entry is however diminished equally and bilaterally.  Percussion note is resonant chest expansion equal no chest wall deformity or tenderness.  Heart examination S1-S2 present rhythm regular no murmurs.  No edema lower extremities.  Abdomen is soft nontender bowel sounds present no liver spleen enlargement.  No peripheral cyanosis clubbing.  Moves all 4 extremities sensorimotor intact.  No cervical, axillary, inguinal adenopathy.    Results Review:    I have reviewed the laboratory and imaging data from current admission. My annotations are as noted in assessment and plan.    Medication Review:  I have reviewed the current MAR. My annotations are as noted in assessment and plan.    Plan   PCCM Problems  COPD exacerbation, ? mild  Coronavirus (Non-Covid-19) infection  Chronic hypoxia  Acute hypokalemia  Uncontrolled hypertension  Acute diastolic CHF  PHT - with normal PCWP (2016)  Relevant Medical Diagnoses  Hx MV replacement, tissue valve  Afib paroxysmal, not on AC  Hx severe GIB      Plan of Treatment  Wheezing if present is quite mild.  However also poor air entry.  Treat with steroids and nebs for now.    Coronavirus non-COVID-19 infection.  Droplet isolation.  Supportive treatment.    Continue supplemental  oxygen at 3 L.  This is her home dose.    Reports worsening dyspnea with exertion.  Possibly related to uncontrolled blood pressure.  See below.  Also check ABG in the morning - I suspect hypercapnia.    In view of elevated BNP will treat with IV Lasix.  Will ask cardiology input.    Treat low potassium level.    Trial heparin sc for dvt prophylaxis - has hx of gi bleed.    Electronically signed by Guanako Crenshaw MD, 06/14/21, 7:37 PM EDT.      Part of this note may be an electronic transcription/translation of spoken language to printed text using the Dragon Dictation System.

## 2021-06-14 NOTE — ED PROVIDER NOTES
EMERGENCY DEPARTMENT ENCOUNTER    Room Number:  33/33  Date of encounter:  6/14/2021  PCP: Javan Martinez MD  Historian: Patient     I used full protective equipment while examining this patient.  This includes face mask, gloves and protective eyewear.  I washed my hands before entering the room and immediately upon leaving the room      HPI:  Chief Complaint: Shortness of breath  A complete HPI/ROS/PMH/PSH/SH/FH are unobtainable due to: None    Context: Paz Browne is a 66 y.o. female who presents to the ED c/o shortness of breath.  Patient states she has had shortness of breath ongoing for about 2 or 3 weeks.  She states she was seen last week by Dr. Melo in the office.  Last night she developed chills and increased shortness of breath.  Shortness of breath currently moderate and worsened with cough and with exertion.  Patient does have occasionally productive cough which is productive of cloudy sputum.  She denies chest pain.  Patient does have a history of COPD and is on 3 L of oxygen chronically at home.      MEDICAL RECORD REVIEW  I reviewed prior medical records and note the patient was seen in our ED several weeks ago with COPD exacerbation.  Patient was last hospitalized in early May about 1 month ago with tremor and shaking with fatigue.  Patient was noted to have hyperkalemia.  Patient has a history of oxygen dependent COPD and is followed by Dr. Anatoliy Melo.    PAST MEDICAL HISTORY  Active Ambulatory Problems     Diagnosis Date Noted   • PAF (paroxysmal atrial fibrillation) (CMS/[facility]) 01/25/2016   • Hypertension    • Hyperlipidemia    • Pain medication agreement 05/04/2016   • Hiatal hernia 05/04/2016   • Primary osteoarthritis involving multiple joints 05/04/2016   • Pulmonary hypertension (CMS/[facility])    • S/P TVR (tricuspid valve repair) 07/07/2016   • Pulmonary nodule 10/13/2016   • Hemoptysis 10/14/2016   • RLS (restless legs syndrome) 11/18/2016   • Chronic low back pain 08/02/2017   •  Dysplastic polyp of colon 08/07/2017   • History of mitral valve replacement with tissue graft 08/11/2017   • Chronic respiratory failure with hypoxia (CMS/HCC) 08/13/2017   • Anemia 08/09/2017   • Celiac artery stenosis (CMS/HCC) 08/24/2017   • Intertrigo 08/25/2017   • COPD (chronic obstructive pulmonary disease) (CMS/HCC) 06/30/2019   • Abnormal EKG 06/30/2019   • Shingles 04/08/2020   • Vertigo 10/06/2020   • Muscle spasms of both lower extremities 12/22/2020   • Iron deficiency anemia 03/30/2021   • Adenomatous polyp of colon 03/30/2021   • Dark stools 03/30/2021   • Gastroesophageal reflux disease without esophagitis 03/30/2021   • Anemia 05/10/2021     Resolved Ambulatory Problems     Diagnosis Date Noted   • Tachycardia    • Renal failure    • Palpitations    • Mitral regurgitation    • Heart failure (CMS/HCC) 06/08/2016   • Long term current use of anticoagulant 10/13/2016   • Aspiration pneumonia (CMS/HCC) 10/14/2016   • Lower GI bleed 08/09/2017   • Precordial pain 08/11/2017   • Oral candidiasis 08/14/2017   • VAN (acute kidney injury) (CMS/HCC) 08/15/2017   • GI bleed 12/01/2017   • Acute exacerbation of chronic obstructive pulmonary disease (COPD) (CMS/HCC) 01/02/2018   • Pneumonia due to infectious organism 07/18/2018   • Insomnia due to medical condition 02/01/2019   • Acute hyperkalemia 05/10/2021   • Tremor 05/10/2021     Past Medical History:   Diagnosis Date   • Asthma    • Atrial flutter (CMS/HCC)    • Cataract    • Colon polyp    • Emphysema of lung (CMS/HCC)    • History of CHF (congestive heart failure)    • History of home oxygen therapy    • Infectious viral hepatitis    • Long term (current) use of anticoagulants    • Pneumonia          PAST SURGICAL HISTORY  Past Surgical History:   Procedure Laterality Date   • CARDIAC CATHETERIZATION  09/01/2014    Right dominant systemt, normal coronary arteries.    • CARDIAC CATHETERIZATION Left 6/10/2016    Procedure: Cardiac catheterization;   Surgeon: Sergei Hall MD;  Location: [facility] CATH INVASIVE LOCATION;  Service:    • CARDIAC CATHETERIZATION N/A 6/10/2016    Procedure: Right Heart Cath;  Surgeon: Sergei Hall MD;  Location: [facility] CATH INVASIVE LOCATION;  Service:    • CATARACT EXTRACTION     • COLONOSCOPY     • COLONOSCOPY N/A 8/4/2017    Procedure: COLONOSCOPY TO CECUM/TI WITH POLYPECTOMY ( COLD BX);  Surgeon: Cleveland Devine MD;  Location: [facility] ENDOSCOPY;  Service:    • COLONOSCOPY N/A 8/10/2017    Procedure: COLONOSCOPY to cecum and TI with 2 clips placed at transverse;  Surgeon: Earnest PALOMO MD;  Location: [facility] ENDOSCOPY;  Service:    • COLONOSCOPY N/A 12/22/2017    Procedure: COLONOSCOPY INTO CECUM WITH COLD POLYPECTOMIES;  Surgeon: Cleveland Devine MD;  Location: [facility] ENDOSCOPY;  Service:    • COLONOSCOPY N/A 5/17/2021    Procedure: COLONOSCOPY to cecum;  Surgeon: Cleveland Devine MD;  Location: [facility] ENDOSCOPY;  Service: Gastroenterology;  Laterality: N/A;  Pre: Fe deficency anemia, h/x of polyps  Post: fair prep, normal   • ENDOSCOPY N/A 8/17/2017    Procedure: ESOPHAGOGASTRODUODENOSCOPY;  Surgeon: Porsha Ruby MD;  Location: [facility] ENDOSCOPY;  Service:    • ENDOSCOPY N/A 12/22/2017    Procedure: ESOPHAGOGASTRODUODENOSCOPY WITH BIOPSIES;  Surgeon: Cleveland Devine MD;  Location: [facility] ENDOSCOPY;  Service:    • ENDOSCOPY N/A 5/17/2021    Procedure: ESOPHAGOGASTRODUODENOSCOPY  with biopsies;  Surgeon: Cleveland Devine MD;  Location: [facility] ENDOSCOPY;  Service: Gastroenterology;  Laterality: N/A;  Pre: Fe deficency anemia, nausea, heme positive stool   Post: gastritis, sloughing of distal esophagus mucosa   • GALLBLADDER SURGERY     • HEMORRHOIDECTOMY     • HYSTERECTOMY     • KIDNEY SURGERY  04/22/2013    Stent placement   • MAZE PROCEDURE     • MITRAL VALVE REPLACEMENT     • TONSILLECTOMY     • TRICUSPID VALVE REPLACEMENT           FAMILY HISTORY  Family History   Adopted: Yes   Problem Relation Age of Onset   • No Known Problems Mother    •  No Known Problems Father          SOCIAL HISTORY  Social History     Socioeconomic History   • Marital status:      Spouse name: Not on file   • Number of children: 2   • Years of education: Not on file   • Highest education level: Not on file   Tobacco Use   • Smoking status: Former Smoker     Quit date:      Years since quittin.4   • Smokeless tobacco: Never Used   Substance and Sexual Activity   • Alcohol use: No     Comment: caffeine use   • Drug use: No   • Sexual activity: Defer         ALLERGIES  Bupropion, Cephalexin, and Metoprolol       REVIEW OF SYSTEMS  Review of Systems   Constitutional: Positive for chills, fatigue and fever.   HENT: Negative.  Negative for sore throat.    Eyes: Negative.    Respiratory: Positive for cough and shortness of breath.    Cardiovascular: Negative.  Negative for chest pain.   Gastrointestinal: Negative.    Genitourinary: Positive for flank pain (Left flank pain ongoing for several weeks). Negative for dysuria.   Musculoskeletal: Positive for arthralgias. Negative for back pain.   Skin: Negative.  Negative for rash.   Neurological: Negative.  Negative for headaches.   All other systems reviewed and are negative.          PHYSICAL EXAM    I have reviewed the triage vital signs and nursing notes.    ED Triage Vitals [21 1633]   Temp Heart Rate Resp BP SpO2   (!) 101.7 °F (38.7 °C) 98 22 148/71 97 %      Temp src Heart Rate Source Patient Position BP Location FiO2 (%)   -- -- -- -- --       Physical Exam  GENERAL: Alert female in no obvious distress.  Triage vitals reviewed notable for temperature of 101.7.  O2 saturation 97% on 3 L nasal cannula  HENT: nares patent  EYES: no scleral icterus  CV: regular rhythm, regular rate-systolic murmur is appreciated  RESPIRATORY: Decreased breath sounds bilaterally with poor air movement.  O2 saturation 97% on 2 L nasal cannula  ABDOMEN: soft, mild left-sided tenderness without rebound or guarding  MUSCULOSKELETAL: no  deformity-no segment swelling or tenderness to palpation  NEURO: Strength, sensation, and coordination are grossly intact.  Speech and mentation are unremarkable  SKIN: warm, dry-no significant rashes are noted      LAB RESULTS  Recent Results (from the past 24 hour(s))   Respiratory Panel PCR w/COVID-19(SARS-CoV-2) KAJAL/MARILIN/DEBBIE/PAD/COR/MAD/DAVID In-House, NP Swab in UTM/VTM, 3-4 HR TAT - Swab, Nasopharynx    Collection Time: 06/14/21  5:06 PM    Specimen: Nasopharynx; Swab   Result Value Ref Range    ADENOVIRUS, PCR Not Detected Not Detected    Coronavirus 229E Not Detected Not Detected    Coronavirus HKU1 Not Detected Not Detected    Coronavirus NL63 Detected (A) Not Detected    Coronavirus OC43 Not Detected Not Detected    COVID19 Not Detected Not Detected - Ref. Range    Human Metapneumovirus Not Detected Not Detected    Human Rhinovirus/Enterovirus Not Detected Not Detected    Influenza A PCR Not Detected Not Detected    Influenza B PCR Not Detected Not Detected    Parainfluenza Virus 1 Not Detected Not Detected    Parainfluenza Virus 2 Not Detected Not Detected    Parainfluenza Virus 3 Not Detected Not Detected    Parainfluenza Virus 4 Not Detected Not Detected    RSV, PCR Not Detected Not Detected    Bordetella pertussis pcr Not Detected Not Detected    Bordetella parapertussis PCR Not Detected Not Detected    Chlamydophila pneumoniae PCR Not Detected Not Detected    Mycoplasma pneumo by PCR Not Detected Not Detected   Comprehensive Metabolic Panel    Collection Time: 06/14/21  5:23 PM    Specimen: Blood   Result Value Ref Range    Glucose 127 (H) 65 - 99 mg/dL    BUN 11 8 - 23 mg/dL    Creatinine 0.83 0.57 - 1.00 mg/dL    Sodium 139 136 - 145 mmol/L    Potassium 2.8 (L) 3.5 - 5.2 mmol/L    Chloride 96 (L) 98 - 107 mmol/L    CO2 31.4 (H) 22.0 - 29.0 mmol/L    Calcium 9.1 8.6 - 10.5 mg/dL    Total Protein 6.7 6.0 - 8.5 g/dL    Albumin 4.30 3.50 - 5.20 g/dL    ALT (SGPT) 14 1 - 33 U/L    AST (SGOT) 21 1 - 32 U/L     Alkaline Phosphatase 79 39 - 117 U/L    Total Bilirubin 1.1 0.0 - 1.2 mg/dL    eGFR Non African Amer 68 >60 mL/min/1.73    Globulin 2.4 gm/dL    A/G Ratio 1.8 g/dL    BUN/Creatinine Ratio 13.3 7.0 - 25.0    Anion Gap 11.6 5.0 - 15.0 mmol/L   Protime-INR    Collection Time: 06/14/21  5:23 PM    Specimen: Blood   Result Value Ref Range    Protime 13.9 11.7 - 14.2 Seconds    INR 1.09 0.90 - 1.10   BNP    Collection Time: 06/14/21  5:23 PM    Specimen: Blood   Result Value Ref Range    proBNP 3,638.0 (H) 0.0 - 900.0 pg/mL   D-dimer, Quantitative    Collection Time: 06/14/21  5:23 PM    Specimen: Blood   Result Value Ref Range    D-Dimer, Quantitative 0.71 (H) 0.00 - 0.49 MCGFEU/mL   Troponin    Collection Time: 06/14/21  5:23 PM    Specimen: Blood   Result Value Ref Range    Troponin T 0.019 0.000 - 0.030 ng/mL   Lactic Acid, Plasma    Collection Time: 06/14/21  5:23 PM    Specimen: Blood   Result Value Ref Range    Lactate 1.2 0.5 - 2.0 mmol/L   Procalcitonin    Collection Time: 06/14/21  5:23 PM    Specimen: Blood   Result Value Ref Range    Procalcitonin 0.32 (H) 0.00 - 0.25 ng/mL   CBC Auto Differential    Collection Time: 06/14/21  5:23 PM    Specimen: Blood   Result Value Ref Range    WBC 7.76 3.40 - 10.80 10*3/mm3    RBC 3.38 (L) 3.77 - 5.28 10*6/mm3    Hemoglobin 9.8 (L) 12.0 - 15.9 g/dL    Hematocrit 29.7 (L) 34.0 - 46.6 %    MCV 87.9 79.0 - 97.0 fL    MCH 29.0 26.6 - 33.0 pg    MCHC 33.0 31.5 - 35.7 g/dL    RDW 13.7 12.3 - 15.4 %    RDW-SD 43.7 37.0 - 54.0 fl    MPV 11.0 6.0 - 12.0 fL    Platelets 179 140 - 450 10*3/mm3    Neutrophil % 92.9 (H) 42.7 - 76.0 %    Lymphocyte % 4.0 (L) 19.6 - 45.3 %    Monocyte % 2.4 (L) 5.0 - 12.0 %    Eosinophil % 0.1 (L) 0.3 - 6.2 %    Basophil % 0.1 0.0 - 1.5 %    Neutrophils, Absolute 7.20 (H) 1.70 - 7.00 10*3/mm3    Lymphocytes, Absolute 0.31 (L) 0.70 - 3.10 10*3/mm3    Monocytes, Absolute 0.19 0.10 - 0.90 10*3/mm3    Eosinophils, Absolute 0.01 0.00 - 0.40 10*3/mm3     Basophils, Absolute 0.01 0.00 - 0.20 10*3/mm3   Magnesium    Collection Time: 06/14/21  5:23 PM    Specimen: Blood   Result Value Ref Range    Magnesium 1.5 (L) 1.6 - 2.4 mg/dL   ECG 12 Lead    Collection Time: 06/14/21  5:29 PM   Result Value Ref Range    QT Interval 435 ms       Ordered the above labs and independently reviewed the results.      RADIOLOGY  XR Chest 1 View    Result Date: 6/14/2021  PORTABLE CHEST X-RAY  HISTORY: Shortness of breath, possible COVID 19 pneumonia.  TECHNIQUE: Portable chest x-ray is correlated with chest x-ray May 20, 2021.  FINDINGS: There are sternal wires with mild cardiomegaly and extensive emphysematous pulmonary parenchymal change. No infiltrate, edema, or effusion is present.      Extensive chronic change in the chest. No acute abnormality. Specifically, there is no x-ray evidence of COVID 19 pneumonia.  This report was finalized on 6/14/2021 6:09 PM by Dr. Akbar Kumar M.D.        I ordered the above noted radiological studies. Reviewed by me and discussed with radiologist.  See dictation for official radiology interpretation.      PROCEDURES  Procedures      MEDICATIONS GIVEN IN ER    Medications   oxyCODONE-acetaminophen (PERCOCET) 5-325 MG per tablet 2 tablet (has no administration in time range)   ipratropium-albuterol (DUO-NEB) nebulizer solution 3 mL (has no administration in time range)   potassium chloride (K-DUR,KLOR-CON) ER tablet 40 mEq (40 mEq Oral Given 6/14/21 1855)         PROGRESS, DATA ANALYSIS, CONSULTS, AND MEDICAL DECISION MAKING    All labs have been independently reviewed by me.  All radiology studies have been reviewed by me and discussed with radiologist dictating the report.   EKG's independently viewed and interpreted by me.  Discussion below represents my analysis of pertinent findings related to patient's condition, differential diagnosis, treatment plan and final disposition.      ED Course as of Jun 14 1915   Mon Jun 14, 2021   2085  PSA-21-evtm-old female with history of oxygen dependent COPD presents with fever and increased shortness of breath.  Differential diagnosis is broad and would include infectious causes such as pneumonia and COPD exacerbation.  Would consider cardiac causes such as arrhythmia, MI and congestive heart failure.  Would consider metabolic causes such as anemia or hepatic or renal failure.    [DB]   1714 Patient was given IV Solu-Medrol by EMS.    [DB]   1753 EKG          EKG time: 1729  Rhythm/Rate: Sinus 98  P waves and NY: Normal P waves and NY intervals  QRS, axis: Normal axis, LVH  ST and T waves: Lateral inverted T waves, prolonged QT interval    Interpreted Contemporaneously by me, independently viewed  Lateral T wave changes are new and of unclear significance.      [DB]   1829 Chest x-ray was reviewed with reading radiologist Dr. Akbar Kumar.  There is extensive chronic changes but no obvious acute pathology within the chest.    [DB]   1829 Labs are reviewed notable for normal white blood count.  Hemoglobin of 9.8 which is similar to baseline.  Chemistries are reviewed and notable for hypokalemia with potassium of 2.8 we will go ahead and give some oral potassium.      [DB]   1830 BMP is reviewed and shows elevation at 3600 although clinical scenario is not suggestive of acute congestive heart failure.  This value is similar to labs drawn about 1 month ago.    [DB]   1830 Lactic acid is within normal is at 1.2 although procalcitonin is mildly elevated 0.32 which could suggest some degree of bacterial infection.  Patient was able to give a sputum specimen which I have sent for culture.    [DB]   1831 D-dimer is mildly elevated at 0.71 with clinical scenario is not suggestive of pulmonary embolism.    [DB]   1831 Patient's respiratory panel was positive for plain coronavirus, not COVID-19.  I suspect patient is having COPD exacerbation related to viral illness.  Will discuss findings with pulmonologist.    [DB]    1913 I discussed patient's treatment and care with on-call pulmonologist, Dr. Guanako Crenshaw.  He will admit to telemetry bed.    [DB]      ED Course User Index  [DB] Fam Sosa MD       AS OF 19:15 EDT VITALS:    BP - 163/66  HR - 92  TEMP - (!) 101.7 °F (38.7 °C)  O2 SATS - 96%      DIAGNOSIS  Final diagnoses:   COPD exacerbation (CMS/[facility])   Shortness of breath   Coronavirus infection   Hypokalemia         DISPOSITION  Admission         Fam Sosa MD  06/14/21 1915

## 2021-06-14 NOTE — ED NOTES
Pt states headache pain is an 8 out of 10 on pain scale . Pt states she believes her bp medication isnt working causing her to have these headaches behind her eyes. MD notified awaiting new orders.      Elen Moore RN  06/14/21 1945

## 2021-06-14 NOTE — TELEPHONE ENCOUNTER
PATIENT SPOUSE CALLED IN REQUESTING FOR THE PATIENT TO BE SEEN BY THE DOCTOR FOR ELEVATED BLOOD PRESSURE.    BEST CALL BACK # [phone]

## 2021-06-14 NOTE — ED TRIAGE NOTES
Patient brought in by EMS from home with complaint of shortness of breath, fever, and cough. Alert and oriented.

## 2021-06-15 ENCOUNTER — APPOINTMENT (OUTPATIENT)
Dept: CARDIOLOGY | Facility: HOSPITAL | Age: 66
End: 2021-06-15

## 2021-06-15 ENCOUNTER — APPOINTMENT (OUTPATIENT)
Dept: CT IMAGING | Facility: HOSPITAL | Age: 66
End: 2021-06-15

## 2021-06-15 LAB
ANION GAP SERPL CALCULATED.3IONS-SCNC: 9 MMOL/L (ref 5–15)
AORTIC DIMENSIONLESS INDEX: 0.7 (DI)
ARTERIAL PATENCY WRIST A: POSITIVE
ASCENDING AORTA: 2.1 CM
ATMOSPHERIC PRESS: 748.2 MMHG
BACTERIA BLD CULT: ABNORMAL
BACTERIA UR QL AUTO: NORMAL /HPF
BASE EXCESS BLDA CALC-SCNC: 6.7 MMOL/L (ref 0–2)
BDY SITE: ABNORMAL
BH CV ECHO MEAS - ACS: 1.5 CM
BH CV ECHO MEAS - AO MAX PG (FULL): 4.6 MMHG
BH CV ECHO MEAS - AO MAX PG: 9.7 MMHG
BH CV ECHO MEAS - AO MEAN PG (FULL): 2 MMHG
BH CV ECHO MEAS - AO MEAN PG: 6 MMHG
BH CV ECHO MEAS - AO ROOT AREA (BSA CORRECTED): 1.3
BH CV ECHO MEAS - AO ROOT AREA: 4.2 CM^2
BH CV ECHO MEAS - AO ROOT DIAM: 2.3 CM
BH CV ECHO MEAS - AO V2 MAX: 156 CM/SEC
BH CV ECHO MEAS - AO V2 MEAN: 112 CM/SEC
BH CV ECHO MEAS - AO V2 VTI: 33.4 CM
BH CV ECHO MEAS - ASC AORTA: 2.1 CM
BH CV ECHO MEAS - AVA(I,A): 2.1 CM^2
BH CV ECHO MEAS - AVA(I,D): 2.1 CM^2
BH CV ECHO MEAS - AVA(V,A): 2.1 CM^2
BH CV ECHO MEAS - AVA(V,D): 2.1 CM^2
BH CV ECHO MEAS - BSA(HAYCOCK): 1.7 M^2
BH CV ECHO MEAS - BSA: 1.7 M^2
BH CV ECHO MEAS - BZI_BMI: 21.1 KILOGRAMS/M^2
BH CV ECHO MEAS - BZI_METRIC_HEIGHT: 170.2 CM
BH CV ECHO MEAS - BZI_METRIC_WEIGHT: 61.2 KG
BH CV ECHO MEAS - EDV(CUBED): 91.1 ML
BH CV ECHO MEAS - EDV(MOD-SP2): 66 ML
BH CV ECHO MEAS - EDV(MOD-SP4): 48 ML
BH CV ECHO MEAS - EDV(TEICH): 92.4 ML
BH CV ECHO MEAS - EF(CUBED): 78.4 %
BH CV ECHO MEAS - EF(MOD-BP): 50.1 %
BH CV ECHO MEAS - EF(MOD-SP2): 50 %
BH CV ECHO MEAS - EF(MOD-SP4): 52.1 %
BH CV ECHO MEAS - EF(TEICH): 70.8 %
BH CV ECHO MEAS - ESV(CUBED): 19.7 ML
BH CV ECHO MEAS - ESV(MOD-SP2): 33 ML
BH CV ECHO MEAS - ESV(MOD-SP4): 23 ML
BH CV ECHO MEAS - ESV(TEICH): 27 ML
BH CV ECHO MEAS - FS: 40 %
BH CV ECHO MEAS - IVS/LVPW: 0.82
BH CV ECHO MEAS - IVSD: 0.9 CM
BH CV ECHO MEAS - LAT PEAK E' VEL: 11 CM/SEC
BH CV ECHO MEAS - LV DIASTOLIC VOL/BSA (35-75): 28.1 ML/M^2
BH CV ECHO MEAS - LV MASS(C)D: 153.3 GRAMS
BH CV ECHO MEAS - LV MASS(C)DI: 89.6 GRAMS/M^2
BH CV ECHO MEAS - LV MAX PG: 5.1 MMHG
BH CV ECHO MEAS - LV MEAN PG: 4 MMHG
BH CV ECHO MEAS - LV SYSTOLIC VOL/BSA (12-30): 13.4 ML/M^2
BH CV ECHO MEAS - LV V1 MAX: 113 CM/SEC
BH CV ECHO MEAS - LV V1 MEAN: 91.1 CM/SEC
BH CV ECHO MEAS - LV V1 VTI: 25 CM
BH CV ECHO MEAS - LVIDD: 4.5 CM
BH CV ECHO MEAS - LVIDS: 2.7 CM
BH CV ECHO MEAS - LVLD AP2: 6.4 CM
BH CV ECHO MEAS - LVLD AP4: 6.2 CM
BH CV ECHO MEAS - LVLS AP2: 5.7 CM
BH CV ECHO MEAS - LVLS AP4: 5.2 CM
BH CV ECHO MEAS - LVOT AREA (M): 2.8 CM^2
BH CV ECHO MEAS - LVOT AREA: 2.8 CM^2
BH CV ECHO MEAS - LVOT DIAM: 1.9 CM
BH CV ECHO MEAS - LVPWD: 1.1 CM
BH CV ECHO MEAS - MED PEAK E' VEL: 6.1 CM/SEC
BH CV ECHO MEAS - MV DEC SLOPE: 1150 CM/SEC^2
BH CV ECHO MEAS - MV DEC TIME: 0.2 SEC
BH CV ECHO MEAS - MV E MAX VEL: 221 CM/SEC
BH CV ECHO MEAS - MV MAX PG: 20.7 MMHG
BH CV ECHO MEAS - MV MEAN PG: 8 MMHG
BH CV ECHO MEAS - MV P1/2T MAX VEL: 231.5 CM/SEC
BH CV ECHO MEAS - MV P1/2T: 59 MSEC
BH CV ECHO MEAS - MV V2 MAX: 226.5 CM/SEC
BH CV ECHO MEAS - MV V2 MEAN: 129 CM/SEC
BH CV ECHO MEAS - MV V2 VTI: 40.7 CM
BH CV ECHO MEAS - MVA P1/2T LCG: 0.95 CM^2
BH CV ECHO MEAS - MVA(P1/2T): 3.7 CM^2
BH CV ECHO MEAS - MVA(VTI): 1.7 CM^2
BH CV ECHO MEAS - PA ACC TIME: 0.11 SEC
BH CV ECHO MEAS - PA MAX PG (FULL): 4.2 MMHG
BH CV ECHO MEAS - PA MAX PG: 4.8 MMHG
BH CV ECHO MEAS - PA PR(ACCEL): 31.3 MMHG
BH CV ECHO MEAS - PA V2 MAX: 110 CM/SEC
BH CV ECHO MEAS - PVA(V,A): 1.4 CM^2
BH CV ECHO MEAS - PVA(V,D): 1.4 CM^2
BH CV ECHO MEAS - QP/QS: 0.44
BH CV ECHO MEAS - RAP SYSTOLE: 15 MMHG
BH CV ECHO MEAS - RV MAX PG: 0.68 MMHG
BH CV ECHO MEAS - RV MEAN PG: 0 MMHG
BH CV ECHO MEAS - RV V1 MAX: 41.1 CM/SEC
BH CV ECHO MEAS - RV V1 MEAN: 27.6 CM/SEC
BH CV ECHO MEAS - RV V1 VTI: 8.2 CM
BH CV ECHO MEAS - RVOT AREA: 3.8 CM^2
BH CV ECHO MEAS - RVOT DIAM: 2.2 CM
BH CV ECHO MEAS - RVSP: 83.2 MMHG
BH CV ECHO MEAS - SI(AO): 81.1 ML/M^2
BH CV ECHO MEAS - SI(CUBED): 41.8 ML/M^2
BH CV ECHO MEAS - SI(LVOT): 41.4 ML/M^2
BH CV ECHO MEAS - SI(MOD-SP2): 19.3 ML/M^2
BH CV ECHO MEAS - SI(MOD-SP4): 14.6 ML/M^2
BH CV ECHO MEAS - SI(TEICH): 38.2 ML/M^2
BH CV ECHO MEAS - SUP REN AO DIAM: 2.4 CM
BH CV ECHO MEAS - SV(AO): 138.8 ML
BH CV ECHO MEAS - SV(CUBED): 71.4 ML
BH CV ECHO MEAS - SV(LVOT): 70.9 ML
BH CV ECHO MEAS - SV(MOD-SP2): 33 ML
BH CV ECHO MEAS - SV(MOD-SP4): 25 ML
BH CV ECHO MEAS - SV(RVOT): 31.3 ML
BH CV ECHO MEAS - SV(TEICH): 65.4 ML
BH CV ECHO MEAS - TAPSE (>1.6): 1.7 CM
BH CV ECHO MEAS - TR MAX VEL: 413 CM/SEC
BH CV ECHO MEASUREMENTS AVERAGE E/E' RATIO: 25.85
BH CV XLRA - RV BASE: 3.5 CM
BH CV XLRA - RV LENGTH: 6.8 CM
BH CV XLRA - RV MID: 3 CM
BH CV XLRA - TDI S': 9 CM/SEC
BILIRUB UR QL STRIP: NEGATIVE
BOTTLE TYPE: ABNORMAL
BUN SERPL-MCNC: 14 MG/DL (ref 8–23)
BUN/CREAT SERPL: 18.2 (ref 7–25)
CALCIUM SPEC-SCNC: 9 MG/DL (ref 8.6–10.5)
CHLORIDE SERPL-SCNC: 98 MMOL/L (ref 98–107)
CLARITY UR: CLEAR
CO2 SERPL-SCNC: 31 MMOL/L (ref 22–29)
COLOR UR: YELLOW
CREAT SERPL-MCNC: 0.77 MG/DL (ref 0.57–1)
DEPRECATED RDW RBC AUTO: 41.7 FL (ref 37–54)
ERYTHROCYTE [DISTWIDTH] IN BLOOD BY AUTOMATED COUNT: 13.4 % (ref 12.3–15.4)
GAS FLOW AIRWAY: 3 LPM
GFR SERPL CREATININE-BSD FRML MDRD: 75 ML/MIN/1.73
GLUCOSE BLDC GLUCOMTR-MCNC: 115 MG/DL (ref 70–130)
GLUCOSE BLDC GLUCOMTR-MCNC: 160 MG/DL (ref 70–130)
GLUCOSE BLDC GLUCOMTR-MCNC: 161 MG/DL (ref 70–130)
GLUCOSE BLDC GLUCOMTR-MCNC: 172 MG/DL (ref 70–130)
GLUCOSE SERPL-MCNC: 115 MG/DL (ref 65–99)
GLUCOSE UR STRIP-MCNC: NEGATIVE MG/DL
HCO3 BLDA-SCNC: 32.7 MMOL/L (ref 22–28)
HCT VFR BLD AUTO: 27.2 % (ref 34–46.6)
HGB BLD-MCNC: 9.1 G/DL (ref 12–15.9)
HGB UR QL STRIP.AUTO: NEGATIVE
HYALINE CASTS UR QL AUTO: NORMAL /LPF
KETONES UR QL STRIP: NEGATIVE
LEFT ATRIUM VOLUME INDEX: 31 ML/M2
LEUKOCYTE ESTERASE UR QL STRIP.AUTO: ABNORMAL
MAGNESIUM SERPL-MCNC: 2.6 MG/DL (ref 1.6–2.4)
MCH RBC QN AUTO: 29 PG (ref 26.6–33)
MCHC RBC AUTO-ENTMCNC: 33.5 G/DL (ref 31.5–35.7)
MCV RBC AUTO: 86.6 FL (ref 79–97)
MODALITY: ABNORMAL
NITRITE UR QL STRIP: NEGATIVE
PCO2 BLDA: 54.1 MM HG (ref 35–45)
PH BLDA: 7.39 PH UNITS (ref 7.35–7.45)
PH UR STRIP.AUTO: 6.5 [PH] (ref 5–8)
PHOSPHATE SERPL-MCNC: 2.6 MG/DL (ref 2.5–4.5)
PLATELET # BLD AUTO: 171 10*3/MM3 (ref 140–450)
PMV BLD AUTO: 11.1 FL (ref 6–12)
PO2 BLDA: 102.4 MM HG (ref 80–100)
POTASSIUM SERPL-SCNC: 4.4 MMOL/L (ref 3.5–5.2)
PROCALCITONIN SERPL-MCNC: 0.39 NG/ML (ref 0–0.25)
PROT UR QL STRIP: NEGATIVE
RBC # BLD AUTO: 3.14 10*6/MM3 (ref 3.77–5.28)
RBC # UR: NORMAL /HPF
REF LAB TEST METHOD: NORMAL
SAO2 % BLDCOA: 97.7 % (ref 92–99)
SINUS: 2.3 CM
SODIUM SERPL-SCNC: 138 MMOL/L (ref 136–145)
SP GR UR STRIP: 1.01 (ref 1–1.03)
SQUAMOUS #/AREA URNS HPF: NORMAL /HPF
STJ: 1.7 CM
TOTAL RATE: 18 BREATHS/MINUTE
UROBILINOGEN UR QL STRIP: ABNORMAL
WBC # BLD AUTO: 8.5 10*3/MM3 (ref 3.4–10.8)
WBC UR QL AUTO: NORMAL /HPF

## 2021-06-15 PROCEDURE — 25010000002 METHYLPREDNISOLONE PER 40 MG: Performed by: INTERNAL MEDICINE

## 2021-06-15 PROCEDURE — 99222 1ST HOSP IP/OBS MODERATE 55: CPT | Performed by: INTERNAL MEDICINE

## 2021-06-15 PROCEDURE — 36415 COLL VENOUS BLD VENIPUNCTURE: CPT | Performed by: INTERNAL MEDICINE

## 2021-06-15 PROCEDURE — 0 IOPAMIDOL PER 1 ML: Performed by: INTERNAL MEDICINE

## 2021-06-15 PROCEDURE — 25010000002 HEPARIN (PORCINE) PER 1000 UNITS: Performed by: INTERNAL MEDICINE

## 2021-06-15 PROCEDURE — 80048 BASIC METABOLIC PNL TOTAL CA: CPT | Performed by: INTERNAL MEDICINE

## 2021-06-15 PROCEDURE — 25010000003 CEFTRIAXONE PER 250 MG: Performed by: INTERNAL MEDICINE

## 2021-06-15 PROCEDURE — 84100 ASSAY OF PHOSPHORUS: CPT | Performed by: INTERNAL MEDICINE

## 2021-06-15 PROCEDURE — 71275 CT ANGIOGRAPHY CHEST: CPT

## 2021-06-15 PROCEDURE — 84145 PROCALCITONIN (PCT): CPT | Performed by: INTERNAL MEDICINE

## 2021-06-15 PROCEDURE — 94799 UNLISTED PULMONARY SVC/PX: CPT

## 2021-06-15 PROCEDURE — 25010000002 MAGNESIUM SULFATE IN D5W 1G/100ML (PREMIX) 1-5 GM/100ML-% SOLUTION: Performed by: INTERNAL MEDICINE

## 2021-06-15 PROCEDURE — 82962 GLUCOSE BLOOD TEST: CPT

## 2021-06-15 PROCEDURE — 25010000002 AMPICILLIN PER 500 MG: Performed by: INTERNAL MEDICINE

## 2021-06-15 PROCEDURE — 25010000002 FUROSEMIDE PER 20 MG: Performed by: INTERNAL MEDICINE

## 2021-06-15 PROCEDURE — 83735 ASSAY OF MAGNESIUM: CPT | Performed by: INTERNAL MEDICINE

## 2021-06-15 PROCEDURE — 93306 TTE W/DOPPLER COMPLETE: CPT

## 2021-06-15 PROCEDURE — 63710000001 INSULIN LISPRO (HUMAN) PER 5 UNITS: Performed by: INTERNAL MEDICINE

## 2021-06-15 PROCEDURE — 85027 COMPLETE CBC AUTOMATED: CPT | Performed by: INTERNAL MEDICINE

## 2021-06-15 PROCEDURE — 93306 TTE W/DOPPLER COMPLETE: CPT | Performed by: INTERNAL MEDICINE

## 2021-06-15 PROCEDURE — 82803 BLOOD GASES ANY COMBINATION: CPT

## 2021-06-15 PROCEDURE — 99223 1ST HOSP IP/OBS HIGH 75: CPT | Performed by: INTERNAL MEDICINE

## 2021-06-15 PROCEDURE — 36600 WITHDRAWAL OF ARTERIAL BLOOD: CPT

## 2021-06-15 PROCEDURE — 81001 URINALYSIS AUTO W/SCOPE: CPT | Performed by: INTERNAL MEDICINE

## 2021-06-15 RX ORDER — CEFTRIAXONE SODIUM 2 G/50ML
2 INJECTION, SOLUTION INTRAVENOUS EVERY 24 HOURS
Status: DISCONTINUED | OUTPATIENT
Start: 2021-06-15 | End: 2021-06-16

## 2021-06-15 RX ADMIN — ROFLUMILAST 500 MCG: 500 TABLET ORAL at 00:05

## 2021-06-15 RX ADMIN — HEPARIN SODIUM 5000 UNITS: 5000 INJECTION INTRAVENOUS; SUBCUTANEOUS at 22:36

## 2021-06-15 RX ADMIN — CARVEDILOL 6.25 MG: 6.25 TABLET, FILM COATED ORAL at 17:33

## 2021-06-15 RX ADMIN — ASPIRIN 81 MG: 81 TABLET, FILM COATED ORAL at 09:34

## 2021-06-15 RX ADMIN — MAGNESIUM SULFATE 1 G: 1 INJECTION INTRAVENOUS at 04:17

## 2021-06-15 RX ADMIN — AMPICILLIN SODIUM 2 G: 2 INJECTION, POWDER, FOR SOLUTION INTRAVENOUS at 11:05

## 2021-06-15 RX ADMIN — POTASSIUM CHLORIDE 40 MEQ: 750 TABLET, EXTENDED RELEASE ORAL at 04:17

## 2021-06-15 RX ADMIN — HYDROCODONE BITARTRATE AND ACETAMINOPHEN 1 TABLET: 5; 325 TABLET ORAL at 09:35

## 2021-06-15 RX ADMIN — METHYLPREDNISOLONE SODIUM SUCCINATE 40 MG: 40 INJECTION, POWDER, FOR SOLUTION INTRAMUSCULAR; INTRAVENOUS at 09:35

## 2021-06-15 RX ADMIN — MAGNESIUM OXIDE 400 MG (241.3 MG MAGNESIUM) TABLET 400 MG: TABLET at 09:34

## 2021-06-15 RX ADMIN — CARVEDILOL 6.25 MG: 6.25 TABLET, FILM COATED ORAL at 09:34

## 2021-06-15 RX ADMIN — MAGNESIUM SULFATE 1 G: 1 INJECTION INTRAVENOUS at 01:44

## 2021-06-15 RX ADMIN — IOPAMIDOL 95 ML: 755 INJECTION, SOLUTION INTRAVENOUS at 12:14

## 2021-06-15 RX ADMIN — IPRATROPIUM BROMIDE AND ALBUTEROL SULFATE 3 ML: 2.5; .5 SOLUTION RESPIRATORY (INHALATION) at 15:03

## 2021-06-15 RX ADMIN — INSULIN LISPRO 2 UNITS: 100 INJECTION, SOLUTION INTRAVENOUS; SUBCUTANEOUS at 17:33

## 2021-06-15 RX ADMIN — MAGNESIUM SULFATE 1 G: 1 INJECTION INTRAVENOUS at 03:04

## 2021-06-15 RX ADMIN — ZOLPIDEM TARTRATE 5 MG: 5 TABLET ORAL at 01:45

## 2021-06-15 RX ADMIN — HYDROCODONE BITARTRATE AND ACETAMINOPHEN 1 TABLET: 5; 325 TABLET ORAL at 17:41

## 2021-06-15 RX ADMIN — IPRATROPIUM BROMIDE AND ALBUTEROL SULFATE 3 ML: 2.5; .5 SOLUTION RESPIRATORY (INHALATION) at 07:12

## 2021-06-15 RX ADMIN — FUROSEMIDE 40 MG: 10 INJECTION, SOLUTION INTRAMUSCULAR; INTRAVENOUS at 09:35

## 2021-06-15 RX ADMIN — ATORVASTATIN CALCIUM 40 MG: 20 TABLET, FILM COATED ORAL at 00:05

## 2021-06-15 RX ADMIN — HEPARIN SODIUM 5000 UNITS: 5000 INJECTION INTRAVENOUS; SUBCUTANEOUS at 17:33

## 2021-06-15 RX ADMIN — ATORVASTATIN CALCIUM 40 MG: 20 TABLET, FILM COATED ORAL at 22:32

## 2021-06-15 RX ADMIN — AMLODIPINE BESYLATE 10 MG: 5 TABLET ORAL at 09:34

## 2021-06-15 RX ADMIN — PANTOPRAZOLE SODIUM 40 MG: 40 TABLET, DELAYED RELEASE ORAL at 05:32

## 2021-06-15 RX ADMIN — FUROSEMIDE 40 MG: 10 INJECTION, SOLUTION INTRAMUSCULAR; INTRAVENOUS at 00:05

## 2021-06-15 RX ADMIN — HEPARIN SODIUM 5000 UNITS: 5000 INJECTION INTRAVENOUS; SUBCUTANEOUS at 09:35

## 2021-06-15 RX ADMIN — FUROSEMIDE 40 MG: 10 INJECTION, SOLUTION INTRAMUSCULAR; INTRAVENOUS at 17:33

## 2021-06-15 RX ADMIN — IPRATROPIUM BROMIDE AND ALBUTEROL SULFATE 3 ML: 2.5; .5 SOLUTION RESPIRATORY (INHALATION) at 23:12

## 2021-06-15 RX ADMIN — CYCLOBENZAPRINE 10 MG: 10 TABLET, FILM COATED ORAL at 22:32

## 2021-06-15 RX ADMIN — METHYLPREDNISOLONE SODIUM SUCCINATE 40 MG: 40 INJECTION, POWDER, FOR SOLUTION INTRAMUSCULAR; INTRAVENOUS at 22:32

## 2021-06-15 RX ADMIN — ROPINIROLE 2 MG: 2 TABLET, FILM COATED ORAL at 00:05

## 2021-06-15 RX ADMIN — INSULIN LISPRO 2 UNITS: 100 INJECTION, SOLUTION INTRAVENOUS; SUBCUTANEOUS at 09:36

## 2021-06-15 RX ADMIN — GUAIFENESIN 600 MG: 600 TABLET, EXTENDED RELEASE ORAL at 09:34

## 2021-06-15 RX ADMIN — CEFTRIAXONE SODIUM 2 G: 2 INJECTION, SOLUTION INTRAVENOUS at 15:15

## 2021-06-15 RX ADMIN — GUAIFENESIN 600 MG: 600 TABLET, EXTENDED RELEASE ORAL at 22:33

## 2021-06-15 RX ADMIN — IPRATROPIUM BROMIDE AND ALBUTEROL SULFATE 3 ML: 2.5; .5 SOLUTION RESPIRATORY (INHALATION) at 20:12

## 2021-06-15 RX ADMIN — HEPARIN SODIUM 5000 UNITS: 5000 INJECTION INTRAVENOUS; SUBCUTANEOUS at 00:05

## 2021-06-15 RX ADMIN — CETIRIZINE HYDROCHLORIDE ALLERGY 10 MG: 10 TABLET ORAL at 09:34

## 2021-06-15 RX ADMIN — ROFLUMILAST 500 MCG: 500 TABLET ORAL at 09:34

## 2021-06-15 RX ADMIN — IPRATROPIUM BROMIDE AND ALBUTEROL SULFATE 3 ML: 2.5; .5 SOLUTION RESPIRATORY (INHALATION) at 11:18

## 2021-06-15 RX ADMIN — HYDROCODONE BITARTRATE AND ACETAMINOPHEN 1 TABLET: 5; 325 TABLET ORAL at 01:44

## 2021-06-15 RX ADMIN — ROPINIROLE 2 MG: 2 TABLET, FILM COATED ORAL at 22:32

## 2021-06-15 NOTE — PROGRESS NOTES
Discharge Planning Assessment  [facility]     Patient Name: Paz Browne  MRN: [id number]  Today's Date: 6/15/2021    Admit Date: 6/14/2021    Discharge Needs Assessment     Row Name 06/15/21 4395       Living Environment    Lives With  spouse;sibling(s)    Current Living Arrangements  home/apartment/condo    Provides Primary Care For  no one, unable/limited ability to care for self    Family Caregiver if Needed  child(dorian), adult;sibling(s);spouse    Family Caregiver Names  Spouse, Chapincito, daughter, Cinda    Quality of Family Relationships  helpful;involved;supportive    Able to Return to Prior Arrangements  yes       Resource/Environmental Concerns    Resource/Environmental Concerns  none       Transition Planning    Patient/Family Anticipates Transition to  home with family    Patient/Family Anticipated Services at Transition  none    Transportation Anticipated  family or friend will provide       Discharge Needs Assessment    Equipment Currently Used at Home  respiratory supplies;nebulizer;bath bench    Concerns to be Addressed  adjustment to diagnosis/illness    Equipment Needed After Discharge  none    Provided Post Acute Provider List?  N/A    N/A Provider List Comment  declines needs at this time    Current Discharge Risk  chronically ill        Discharge Plan     Row Name 06/15/21 3832       Plan    Plan  Home w/ family    Patient/Family in Agreement with Plan  yes    Plan Comments  Met with pt. and Cinda Browne, daughter, [phone] .  Face sheet verified. Pt lives with her spouse, Chapincito Browne and her brother- in- law-Eagle.  There are 12 steps w/ railing  to enter the house.  Home O2 and neb are through [facility] nebulizer. Pt also uses a bath bench. She reports that she is IADL. She has used [facility] in the past and would use them again if ordered. She denies dc/SNF needs.  Pt’s PCP is Dr. BRYAN Martinez and preferred pharm is [facility] [address]        Continued Care and Services - Admitted Since  6/14/2021    Coordination has not been started for this encounter.         Demographic Summary    No documentation.       Functional Status     Row Name 06/15/21 1542       Functional Status    Usual Activity Tolerance  fair    Current Activity Tolerance  fair       Functional Status, IADL    Medications  independent    Meal Preparation  independent    Housekeeping  independent    Laundry  independent    Shopping  independent;assistive person       Mental Status    General Appearance WDL  WDL       Mental Status Summary    Recent Changes in Mental Status/Cognitive Functioning  no changes        Psychosocial    No documentation.       Abuse/Neglect    No documentation.       Legal    No documentation.       Substance Abuse    No documentation.       Patient Forms    No documentation.           Ana Castro RN

## 2021-06-15 NOTE — PROGRESS NOTES
"  Daily Progress Note.   [address]  6/15/2021    Patient:  Name:  Paz Browne  MRN:  [id number]  1955  66 y.o.  female         CC: Shortness of breath    Interval History/ros:  Fever initially on upon arrival 38.7.  Patient is on 3 L nasal cannula but 97%.  Blood pressure high upon admission well controlled at present this morning.  Patient describes some back pain.  D-dimer was slightly elevated upon admission coronavirus non-Covid is positive on respiratory viral panel.  She has multiple client complains of some back pain cough congestion shortness of breath with exertion.  Ongoing for about 3 weeks.  Some fever cough sputum production no hemoptysis.    PMFSSH: no change    Physical Exam:  /55 (BP Location: Left arm, Patient Position: Lying)   Pulse 81   Temp 97 °F (36.1 °C) (Oral)   Resp 18   Ht 170.2 cm (67\")   Wt 61.4 kg (135 lb 6.4 oz)   SpO2 100%   BMI 21.21 kg/m²   Body mass index is 21.21 kg/m².    Intake/Output Summary (Last 24 hours) at 6/15/2021 0902  Last data filed at 6/15/2021 0156  Gross per 24 hour   Intake 50 ml   Output 600 ml   Net -550 ml     General appearance: Nontoxic, conversant   Eyes: anicteric sclerae, moist conjunctivae; no lid-lag; PERRLA  HENT: Atraumatic; oropharynx clear with moist mucous membranes and no mucosal ulcerations; normal hard and soft palate  Neck: Trachea midline; FROM, supple, no thyromegaly or lymphadenopathy  Lungs: Faint expiratory wheezing bilaterally some intermittent rhonchi, with normal respiratory effort and no intercostal retractions  CV: RRR, no MRGs   Abdomen: Soft, non-tender; no masses or HSM  Extremities: No peripheral edema or extremity lymphadenopathy  Skin: Warm well perfused no diffuse rash  Psych: Appropriate affect, alert and oriented to person, place and time  Neuro moves all ext, cns 2-12 intact sensation intact    Data Review:  Notable Labs:  Results from last 7 days   Lab Units 06/15/21  0817 " 06/14/21  1723   WBC 10*3/mm3 8.50 7.76   HEMOGLOBIN g/dL 9.1* 9.8*   PLATELETS 10*3/mm3 171 179     Results from last 7 days   Lab Units 06/15/21  0816 06/14/21  1723   SODIUM mmol/L 138 139   POTASSIUM mmol/L 4.4 2.8*   CHLORIDE mmol/L 98 96*   CO2 mmol/L 31.0* 31.4*   BUN mg/dL 14 11   CREATININE mg/dL 0.77 0.83   GLUCOSE mg/dL 115* 127*   CALCIUM mg/dL 9.0 9.1   MAGNESIUM mg/dL 2.6* 1.5*   Estimated Creatinine Clearance: 62.9 mL/min (by C-G formula based on SCr of 0.83 mg/dL).    Results from last 7 days   Lab Units 06/14/21  1723   AST (SGOT) U/L 21   ALT (SGPT) U/L 14   PROCALCITONIN ng/mL 0.32*   LACTATE mmol/L 1.2   D DIMER QUANT MCGFEU/mL 0.71*   PLATELETS 10*3/mm3 179       Results from last 7 days   Lab Units 06/15/21  0417   PH, ARTERIAL pH units 7.389   PCO2, ARTERIAL mm Hg 54.1*   PO2 ART mm Hg 102.4*   HCO3 ART mmol/L 32.7*       Imaging:  Reviewed chest images personally from past 3 days    Scheduled meds:    amLODIPine, 10 mg, Oral, Daily  ampicillin, 2 g, Intravenous, Q6H  aspirin, 81 mg, Oral, Daily  atorvastatin, 40 mg, Oral, Daily  carvedilol, 6.25 mg, Oral, BID With Meals  cetirizine, 10 mg, Oral, Daily  cyclobenzaprine, 10 mg, Oral, Nightly  furosemide, 40 mg, Intravenous, BID  guaiFENesin, 600 mg, Oral, BID  heparin (porcine), 5,000 Units, Subcutaneous, Q8H  insulin lispro, 0-9 Units, Subcutaneous, TID AC  ipratropium-albuterol, 3 mL, Nebulization, Q4H While Awake - RT  magnesium oxide, 400 mg, Oral, Daily  methylPREDNISolone sodium succinate, 40 mg, Intravenous, Q12H  pantoprazole, 40 mg, Oral, QAM  roflumilast, 500 mcg, Oral, Daily  rOPINIRole, 2 mg, Oral, Nightly        ASSESSMENT  /  PLAN:  COPD exacerbation, ? mild  Coronavirus (Non-Covid-19) infection  Chronic hypoxia  Acute hypokalemia improved  Uncontrolled hypertension  Acute diastolic CHF  PHT - with normal PCWP (2016)  Anemia     Hx MV replacement, tissue valve  Afib paroxysmal, not on AC  Hx severe GIB      +BC strep - may be  contaminant, will start ampicillin, consult ID, follow speciation, sensitivities.  Hx of mitral valve replacement with tissue valve    +NONcovid Coronavirus - standard isolation per hospital protocol - covid was negative.      + d dimer, ct angio  Cont iv methylpred  Cont scheduled duonebs  Cont daliresp    Cont coreg, lipitor, aspirin norvasc    K replete, recheck    PPI  dvt proph with heparin    Pt of Dr Melo with out group.    All issues new to me today.  Prior hospital course, labs and imaging reviewed.      Bubba Prince MD  [facility]  06/15/21  09:02 EDT

## 2021-06-15 NOTE — CONSULTS
Received consult request to review patient for positive Coronavirus NL63 on RVP. Standard Precautions are used for this type of Coronavirus. Patient will need MD order placed that they have evaluated patient for Covid rule out and if they do not still suspect Covid with negative Covid testing an order to remove isolation precautions from MD will need to be placed for Infection Control to remove the Covid rule out infection banner. Discussed these details with RN this morning.

## 2021-06-15 NOTE — CONSULTS
Referring Provider: Dr EDY Prince    Reason for Consultation: + blood cultures    History of present illness:  Paz Browne is a 66 y.o. who I am asked to evaluate and give opinion for + blood cultures. History is obtained from the patient and review of the old medical records which I summarize/synthesize as follows: She presented to the ER on 6/14/21 with shortness of breath worse with exertion and a mostly dry cough. She wears 3L NC O2 at baseline and this is unchanged. She actually says that since early May she's had some spells of shaking which she says are different than chills that will spontaneously resolve. She says she's been on antibiotics through her PCP recently and I do see doxycycline was ordered on 5/20 for a 7 day course.     At admission here, she was febrile. RPP + coronavirus (not COVID) and blood cultures positive for Strep. She had MV replacement and TV repair in 2014. No other hardware in the body. She has dentures so no recent dental work or tooth pain. She has been started on ampicillin.     Past Medical History:   Diagnosis Date   • VAN (acute kidney injury) (CMS/HCC)    • Anemia    • Asthma    • Atrial flutter (CMS/HCC)     cardioversion   • Cataract    • Celiac artery stenosis (CMS/HCC)    • CHF (congestive heart failure) (CMS/HCC)    • Chronic respiratory failure with hypoxia (CMS/HCC)    • Colon polyp    • COPD (chronic obstructive pulmonary disease) (CMS/HCC)    • Emphysema of lung (CMS/HCC)    • GI bleed    • Hiatal hernia    • History of CHF (congestive heart failure)     due to MR   • History of home oxygen therapy     3 lpm NC   • History of mitral valve replacement with tissue graft    • Hyperlipidemia    • Hypertension    • Infectious viral hepatitis     B   • Intertrigo    • Long term (current) use of anticoagulants    • Mitral regurgitation     s/p tissue MVR   • PAF (paroxysmal atrial fibrillation) (CMS/HCC)     s/p MAZE   • Pneumonia    • Pulmonary hypertension (CMS/HCC)   Spoke with PT.  He started Imodium last night,  He will try for a few days and see if it works before he picks up the prescription. Has cut back on coffee. PT was at work yesterday when we spoke and couldn't really talk at that time.       • S/P TVR (tricuspid valve repair) 7/7/2016       Past Surgical History:   Procedure Laterality Date   • CARDIAC CATHETERIZATION  09/01/2014    Right dominant systemt, normal coronary arteries.    • CARDIAC CATHETERIZATION Left 6/10/2016    Procedure: Cardiac catheterization;  Surgeon: Sergei Hall MD;  [place] [facility] [place] [facility] [place];  Service:    • CARDIAC CATHETERIZATION N/A 6/10/2016    Procedure: Right Heart Cath;  Surgeon: Sergei Hall MD;  [place] [facility];  Service:    • CATARACT EXTRACTION     • COLONOSCOPY     • COLONOSCOPY N/A 8/4/2017    Procedure: COLONOSCOPY TO CECUM/TI WITH POLYPECTOMY ( COLD BX);  Surgeon: Cleveland Devine MD;  Location[place] [facility];  Service:    • COLONOSCOPY N/A 8/10/2017    Procedure: COLONOSCOPY to cecum and TI with 2 clips placed at transverse;  Surgeon: Earnest PALOMO MD;  Location: [facility];  Service:    • COLONOSCOPY N/A 12/22/2017    Procedure: COLONOSCOPY INTO CECUM WITH COLD POLYPECTOMIES;  Surgeon: Cleveland Devine MD;  Location: [facility];  Service:    • COLONOSCOPY N/A 5/17/2021    Procedure: COLONOSCOPY to cecum;  Surgeon: Cleveland Devine MD;  Location: [facility];  Service: Gastroenterology;  Laterality: N/A;  Pre: Fe deficency anemia, h/x of polyps  Post: fair prep, normal   • ENDOSCOPY N/A 8/17/2017    Procedure: ESOPHAGOGASTRODUODENOSCOPY;  Surgeon: Porsha Ruby MD;  [place] [facility];  Service:    • ENDOSCOPY N/A 12/22/2017    Procedure: ESOPHAGOGASTRODUODENOSCOPY WITH BIOPSIES;  Surgeon: Cleveland Devine MD;  [place]: [facility];  Service:    • ENDOSCOPY N/A 5/17/2021    Procedure: ESOPHAGOGASTRODUODENOSCOPY  with biopsies;  Surgeon: Cleveland Devine MD;  Location: [facility];  Service: Gastroenterology;  Laterality: N/A;  Pre: Fe deficency anemia, nausea, heme positive stool   Post: gastritis, sloughing of distal esophagus mucosa   • GALLBLADDER SURGERY     • HEMORRHOIDECTOMY     •  HYSTERECTOMY     • KIDNEY SURGERY  04/22/2013    Stent placement   • MAZE PROCEDURE     • MITRAL VALVE REPLACEMENT     • TONSILLECTOMY     • TRICUSPID VALVE REPLACEMENT         Social History:    Quit smoking 2007  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 Cephalexin - itching many years ago    Medications:    Current Facility-Administered Medications:   •  amLODIPine (NORVASC) tablet 10 mg, 10 mg, Oral, Daily, Guanako Crenshaw MD, 10 mg at 06/15/21 0934  •  ampicillin 2 g/100 mL 0.9% NS (MBP), 2 g, Intravenous, Q6H, Bubba Prince MD  •  aspirin EC tablet 81 mg, 81 mg, Oral, Daily, Guanako Crenshaw MD, 81 mg at 06/15/21 0934  •  atorvastatin (LIPITOR) tablet 40 mg, 40 mg, Oral, Daily, Guanako Crenshaw MD, 40 mg at 06/15/21 0005  •  carvedilol (COREG) tablet 6.25 mg, 6.25 mg, Oral, BID With Meals, Guanako Crenshaw MD, 6.25 mg at 06/15/21 0934  •  cetirizine (zyrTEC) tablet 10 mg, 10 mg, Oral, Daily, Guanako Crenshaw MD, 10 mg at 06/15/21 0934  •  cyclobenzaprine (FLEXERIL) tablet 10 mg, 10 mg, Oral, Nightly, Guanako Crenshaw MD, 10 mg at 06/14/21 2359  •  furosemide (LASIX) injection 40 mg, 40 mg, Intravenous, BID, Guanako Crenshaw MD, 40 mg at 06/15/21 0935  •  guaiFENesin (MUCINEX) 12 hr tablet 600 mg, 600 mg, Oral, BID, Guanako Crenshaw MD, 600 mg at 06/15/21 0934  •  heparin (porcine) 5000 UNIT/ML injection 5,000 Units, 5,000 Units, Subcutaneous, Q8H, Guanako Crenshaw MD, 5,000 Units at 06/15/21 0935  •  HYDROcodone-acetaminophen (NORCO) 5-325 MG per tablet 1 tablet, 1 tablet, Oral, Q8H PRN, Guanako Crenshaw MD, 1 tablet at 06/15/21 0935  •  insulin lispro (ADMELOG) injection 0-9 Units, 0-9 Units, Subcutaneous, TID AC, Guanako Crenshaw MD, 2 Units at 06/15/21 0936  •  ipratropium-albuterol (DUO-NEB) nebulizer solution 3 mL, 3 mL, Nebulization, Q4H While Awake - RT, Guanako Crenshaw MD, 3 mL at 06/15/21 0712  •  ipratropium-albuterol  (DUO-NEB) nebulizer solution 3 mL, 3 mL, Nebulization, Q2H PRN, Guanako Crenshaw MD  •  magnesium oxide (MAG-OX) tablet 400 mg, 400 mg, Oral, Daily, Guanako Crenshaw MD, 400 mg at 06/15/21 0934  •  magnesium sulfate 4 gram infusion - Mg less than or equal to 1mg/dL, 4 g, Intravenous, PRN **OR** magnesium sulfate 3 gram infusion (1gm x 3) - Mg 1.1 - 1.5 mg/dL, 1 g, Intravenous, PRN, Last Rate: 100 mL/hr at 06/15/21 0417, 1 g at 06/15/21 0417 **OR** Magnesium Sulfate 2 gram infusion- Mg 1.6 - 1.9 mg/dL, 2 g, Intravenous, PRN, Guanako Crenshaw MD  •  meclizine (ANTIVERT) tablet 25 mg, 25 mg, Oral, TID PRN, Guanako Crenshaw MD  •  methylPREDNISolone sodium succinate (SOLU-Medrol) injection 40 mg, 40 mg, Intravenous, Q12H, Guanako Crenshaw MD, 40 mg at 06/15/21 0935  •  ondansetron (ZOFRAN) tablet 4 mg, 4 mg, Oral, Q12H PRN, Guanako Crenshaw MD  •  pantoprazole (PROTONIX) EC tablet 40 mg, 40 mg, Oral, QAM, Guanako Crenshaw MD, 40 mg at 06/15/21 0532  •  polyethylene glycol (MIRALAX) packet 17 g, 17 g, Oral, BID PRN, Guanako Crenshaw MD  •  potassium chloride (K-DUR,KLOR-CON) ER tablet 40 mEq, 40 mEq, Oral, PRN, 40 mEq at 06/15/21 0417 **OR** potassium chloride (KLOR-CON) packet 40 mEq, 40 mEq, Oral, PRN **OR** potassium chloride 10 mEq in 100 mL IVPB, 10 mEq, Intravenous, Q1H PRN, Guanako Crenshaw MD  •  potassium phosphate 45 mmol in sodium chloride 0.9 % 500 mL infusion, 45 mmol, Intravenous, PRN **OR** potassium phosphate 30 mmol in sodium chloride 0.9 % 250 mL infusion, 30 mmol, Intravenous, PRN **OR** potassium phosphate 15 mmol in sodium chloride 0.9 % 100 mL infusion, 15 mmol, Intravenous, PRN **OR** sodium phosphates 40 mmol in sodium chloride 0.9 % 500 mL IVPB, 40 mmol, Intravenous, PRN **OR** sodium phosphates 20 mmol in sodium chloride 0.9 % 250 mL IVPB, 20 mmol, Intravenous, PRN, Guanako Crenshaw MD  •  roflumilast (DALIRESP) tablet 500 mcg, 500 mcg, Oral, Daily, Guanako Crenshaw MD, 500 mcg at 06/15/21 0934  •  rOPINIRole (REQUIP) tablet 2 mg, 2 mg,  "Oral, Nightly, Guanako Crenshaw MD, 2 mg at 06/15/21 0005  •  zolpidem (AMBIEN) tablet 5 mg, 5 mg, Oral, Nightly PRN, Guanako Crenshaw MD, 5 mg at 06/15/21 0145    Review of Systems  All systems were reviewed and are negative unless otherwise stated above in the HPI    Objective   Vital Signs   Temp:  [97 °F (36.1 °C)-101.7 °F (38.7 °C)] 97 °F (36.1 °C)  Heart Rate:  [74-98] 81  Resp:  [14-22] 18  BP: (120-163)/(55-71) 120/55    Physical Exam:   General: awake, alert, NAD   Head: atraumatic  Eyes: no scleral icterus  ENT: wearing mask, no thrush, dentures  Neck: Supple  Cardiovascular: NR, RR, no murmur though heart sounds distant, no LE edema  Respiratory: Lungs are clear to auscultation bilaterally, no significant rales or wheezing; normal work of breathing on 2L NC  GI: Abdomen is soft, non-tender, non-distended, normal bowel sounds in all four quadrants  :  no Vargas catheter  Musculoskeletal: normal musculature  Skin: No rashes, lesions, or embolic phenomenon  Neurological: Alert and oriented x 3, motor strength 5/5 in all four extremities  Psychiatric: Normal mood and affect   Vasc: no cyanosis; PIV w/o erythema    Labs:     Lab Results   Component Value Date    WBC 8.50 06/15/2021    HGB 9.1 (L) 06/15/2021    HCT 27.2 (L) 06/15/2021    MCV 86.6 06/15/2021     06/15/2021       Lab Results   Component Value Date    GLUCOSE 115 (H) 06/15/2021    BUN 14 06/15/2021    CREATININE 0.77 06/15/2021    EGFRIFNONA 75 06/15/2021    BCR 18.2 06/15/2021    CO2 31.0 (H) 06/15/2021    CALCIUM 9.0 06/15/2021    ALBUMIN 4.30 06/14/2021    AST 21 06/14/2021    ALT 14 06/14/2021     Procal 0.39  UA clear    Microbiology:  6/14 RPP: + coronavirus NL63  6/14 BCx: Strep species in 2/2 sets (same arm, same time listed)  6/14 SpCx: normal juan daniel    Radiology (personally reviewed images and report):  CXR: \"Extensive chronic change in the chest. No acute abnormality. Specifically, there is no x-ray evidence of COVID 19 " "pneumonia.\"    Assessment/Plan   1. COPD exacerbation due to coronavirus NL63  2. Strep septicemia  3. Chronic hypoxic respiratory failure (3L NC at home)  4. Mitral valve replaced (8/2014)  5. Tricuspid valve repair (8/2014)    For non-COVID coronavirus and COPD exacerbation, she has fairly minimal symptoms and is on her baseline O2. Continue supportive care. I see a CTA chest has been ordered, and I will follow-up that results. She is on steroids so WBC may rise.     For Strep septicemia, change ampicillin to ceftriaxone 2 g IV q24h while awaiting sensitivities. Her cephalexin allergy was itching many years ago and she tolerated cefdinir in 2019 so low concern for allergic reaction.     Given history of MV replacement and TV repair, I think TTE and possibly CHIO are the next steps. I will repeat BCx in the Am to document sterility.     ID will follow. D/W CAM Emerson.     "

## 2021-06-15 NOTE — ED NOTES
This nurse attempted to call report to [address] [place] [address]. Nurse currently in another room with new patient will call this nurse back shortly.      Elen Moore RN  06/14/21 2042

## 2021-06-15 NOTE — CONSULTS
Cardiology History & Physical / Consultation      Patient Name: Paz Browne  Age/Sex: 66 y.o. female  : 1955  MRN: [id number]    Date of Admission: 2021  Date of Encounter Visit: 06/15/21  Encounter Provider: Andreas Tyson MD  Referring Provider: Guanako Crenshaw MD  Place of Service: [facility] CARDIOLOGY  Patient Care Team:  Javan Martinez MD as PCP - General  Javan Martinez MD as PCP - Family Medicine  [facility], Ag Cope MD as Consulting Physician (Pulmonary Disease)  Cleveland Devine MD as Consulting Physician (Gastroenterology)  Earnest Gan MD as Consulting Physician (Cardiology)  Daniela Shin MD PhD as Consulting Physician (Hematology and Oncology)  Javan Martinez MD as Referring Physician (Internal Medicine)          Subjective:     Chief Complaint: COPD exacerbation    Reason for consultation: Hypertension    History of Present Illness:  Paz Browne is a 66 y.o. female of Dr Randall with a history of valvular heart disease (s/p MVR and TV repair) , hypertension, COPD, paroxysmal atrial fibrillation/flutter, pulmonary hypertension, and GI bleeding.      She was hospitalized in 2014 with SOA and was found to have rapid atrial fibrillation with CHF. She underwent CHIO which showed severe mitral and tricuspid regurgitation and severe pulmonary hypertension.  Cardiac catheterization showed normal coronary arteries.  She underwent mitral repair but this was unsuccessful and had to be replaced with a [employer] porcine prosthesis, TV repair and right and left Cryo-Maze procedure with closure of left atrial appendage.  She did well post surgery but did go back into atrial fibrillation and was started on warfarin.     In 2014 she was noted to be in rapid atrial flutter with SOA. Her Coreg was increased and was later started on amiodarone and eventually had to be cardioverted    In 2016 she presented with progressive  dyspnea and diffuse T wave inversions.  She underwent cardiac catheterization which again showed normal coronary arteries. Right heart cath showed pulmonary hypertension with normal PCWP. She was seen by pulmonary (Dr Melo) and was started on Home O2.     In August 2017 she developed GI bleeding. She had polypectomy and gastritis with hemorrhoids.  She was taken off warfarin and she has maintained NSR.     She was seen in the office on 4/20/2021 for follow up. She has chronic chest pain and occasional palpitations.  She was needing and EGD and colonoscopy for heme postive stools. She was cleared fro scopes and was recommended to hold ASA 3-5 days prior to procedure.     She presented to the ED yesterday with SOA with exertion and chills. She had been seen by pulmonary last week. She had fever of 101 on arrival. She was found to have coronavirus but not COVID-19 with elevated BNP and was admitted by pulmonary.       The patient reports elevated blood pressures at home especially when she ambulates and her medications have recently been adjusted. She has been given lasix 40 mg iV and is scheduled BID.  She has been restarted on her home amlodipine and Coreg.  Her B/P this AM is 120/55        Previous Cardiac Testing   ECHO 4/30/2021  · Left ventricular ejection fraction appears to be 61 - 65%.  · Normal right ventricular cavity size and systolic function noted.  · The left atrial cavity is mild to moderately dilated.  · There is a bioprosthetic mitral valve replacement which appears to be well-seated.  · Gradients across the tissue mitral valve replacement are normal, and the valve appears to open normally  · The mitral valve mean gradient is 5.0 mmHg.  · The tricuspid valve is status post repair.  · There is mild tricuspid regurgitation  · Calculated right ventricular systolic pressure from tricuspid regurgitation is 30 mmHg.  · There is a small (<1cm) pericardial effusion. There is no evidence of cardiac  tamponade    Right and Left Heart Catheterization 6/10/2016  FINDINGS:     1. HEMODYNAMICS:  Resting:  RA 8/9/7, RV 52/7, PA 52/16/31, PCWP 13/18/13, /11, /51/72. CO               50 mcg/kg/min                PA 51/17/31               100 mcg/kg/min              PA 49/14/31               150 mcg/kg/min              PA 53/17/35       PCWP 18/30/21     2. LEFT VENTRICULOGRAPHY: EF 70%, no mitral regurgitation.  Apical diverticulum present.     3. CORONARY ANGIOGRAPHY: Right-dominant system, mild CAD.  The left main is normal.  The proximal LAD is tortuous, but normal.  The mid-LAD has 20% stenosis.  The circumflex has 30% mid-vessel stenosis.  The RCA has 30% mid-vessel stenosis.        SUMMARY: Moderate pulmonary hypertension that did not improve with vasodilator challenge.  Adenosine increased cardiac output and mitral regurgitation.     RECOMMENDATIONS: Pulmonary consult for pulmonary hypertension.      Past Medical History:  Past Medical History:   Diagnosis Date   • VAN (acute kidney injury) (CMS/[facility])    • Anemia    • Asthma    • Atrial flutter (CMS/[facility])     cardioversion   • Cataract    • Celiac artery stenosis (CMS/[facility])    • CHF (congestive heart failure) (CMS/[facility])    • Chronic respiratory failure with hypoxia (CMS/[facility])    • Colon polyp    • COPD (chronic obstructive pulmonary disease) (CMS/[facility])    • Emphysema of lung (CMS/[facility])    • GI bleed    • Hiatal hernia    • History of CHF (congestive heart failure)     due to MR   • History of home oxygen therapy     3 lpm NC   • History of mitral valve replacement with tissue graft    • Hyperlipidemia    • Hypertension    • Infectious viral hepatitis     B   • Intertrigo    • Long term (current) use of anticoagulants    • Mitral regurgitation     s/p tissue MVR   • PAF (paroxysmal atrial fibrillation) (CMS/[facility])     s/p MAZE   • Pneumonia    • Pulmonary hypertension (CMS/[facility])    • S/P TVR (tricuspid valve repair) 7/7/2016       Past Surgical History:    Procedure Laterality Date   • CARDIAC CATHETERIZATION  09/01/2014    Right dominant systemt, normal coronary arteries.    • CARDIAC CATHETERIZATION Left 6/10/2016    Procedure: Cardiac catheterization;  Surgeon: Sergei Hall MD;  Location: [facility] CATH INVASIVE LOCATION;  Service:    • CARDIAC CATHETERIZATION N/A 6/10/2016    Procedure: Right Heart Cath;  Surgeon: Sergei Hall MD;  Location: [facility] CATH INVASIVE LOCATION;  Service:    • CATARACT EXTRACTION     • COLONOSCOPY     • COLONOSCOPY N/A 8/4/2017    Procedure: COLONOSCOPY TO CECUM/TI WITH POLYPECTOMY ( COLD BX);  Surgeon: Cleveland Devine MD;  Location: [facility] ENDOSCOPY;  Service:    • COLONOSCOPY N/A 8/10/2017    Procedure: COLONOSCOPY to cecum and TI with 2 clips placed at transverse;  Surgeon: Earnest PALOMO MD;  Location: [facility] ENDOSCOPY;  Service:    • COLONOSCOPY N/A 12/22/2017    Procedure: COLONOSCOPY INTO CECUM WITH COLD POLYPECTOMIES;  Surgeon: Cleveland Devine MD;  Location: [facility] ENDOSCOPY;  Service:    • COLONOSCOPY N/A 5/17/2021    Procedure: COLONOSCOPY to cecum;  Surgeon: Cleveland Devine MD;  Location: [facility] ENDOSCOPY;  Service: Gastroenterology;  Laterality: N/A;  Pre: Fe deficency anemia, h/x of polyps  Post: fair prep, normal   • ENDOSCOPY N/A 8/17/2017    Procedure: ESOPHAGOGASTRODUODENOSCOPY;  Surgeon: Porsha Ruby MD;  Location: [facility] ENDOSCOPY;  Service:    • ENDOSCOPY N/A 12/22/2017    Procedure: ESOPHAGOGASTRODUODENOSCOPY WITH BIOPSIES;  Surgeon: Cleveland Devine MD;  Location: [facility] ENDOSCOPY;  Service:    • ENDOSCOPY N/A 5/17/2021    Procedure: ESOPHAGOGASTRODUODENOSCOPY  with biopsies;  Surgeon: Cleveland Devine MD;  Location: [facility] ENDOSCOPY;  Service: Gastroenterology;  Laterality: N/A;  Pre: Fe deficency anemia, nausea, heme positive stool   Post: gastritis, sloughing of distal esophagus mucosa   • GALLBLADDER SURGERY     • HEMORRHOIDECTOMY     • HYSTERECTOMY     • KIDNEY SURGERY  04/22/2013    Stent placement   • MAZE PROCEDURE      • MITRAL VALVE REPLACEMENT     • TONSILLECTOMY     • TRICUSPID VALVE REPLACEMENT         Home Medications:   Medications Prior to Admission   Medication Sig Dispense Refill Last Dose   • albuterol (PROVENTIL) (2.5 MG/3ML) 0.083% nebulizer solution INHALE 1 VIAL BY MOUTH EVERY 4 HOURS AS NEEDED FOR WHEEZING 150 mL 3    • albuterol sulfate  (90 Base) MCG/ACT inhaler Inhale 2 puffs Every 4 (Four) Hours As Needed for Wheezing. 18 g 3    • amLODIPine (NORVASC) 10 MG tablet TAKE 1 TABLET BY MOUTH EVERY DAY 90 tablet 1    • aspirin 81 MG EC tablet Take 1 tablet by mouth Daily.      • atorvastatin (LIPITOR) 40 MG tablet TAKE 1 TABLET BY MOUTH EVERY DAY 90 tablet 2    • benzonatate (TESSALON) 100 MG capsule TAKE 1 CAPSULE BY MOUTH 2 TIMES A DAY AS NEEDED FOR COUGH 180 capsule 1    • carvedilol (COREG) 6.25 MG tablet TAKE 1 TABLET BY MOUTH TWICE A DAY WITH MEALS 180 tablet 2    • cyclobenzaprine (FLEXERIL) 10 MG tablet TAKE 1 TABLET BY MOUTH AT BEDTIME 90 tablet 3    • fluticasone-salmeterol (Advair Diskus) 250-50 MCG/DOSE DISKUS Inhale 1 puff 2 (Two) Times a Day. 3 each 3    • furosemide (LASIX) 40 MG tablet Take 1 tablet by mouth Daily. 30 tablet 3    • guaiFENesin (MUCINEX) 600 MG 12 hr tablet Take 600 mg by mouth 2 (Two) Times a Day.      • HYDROcodone-acetaminophen (NORCO) 5-325 MG per tablet Take 1 tablet by mouth Every 8 (Eight) Hours As Needed for Moderate Pain . Chronic pain medicine for low back pain 90 tablet 0    • loratadine (Claritin) 10 MG tablet Take 10 mg by mouth 2 (two) times a day.      • magnesium oxide (MAG-OX) 400 MG tablet TAKE 1 TABLET BY MOUTH EVERY DAY 90 tablet 1    • meclizine (ANTIVERT) 25 MG tablet Take 1 tablet by mouth 3 (Three) Times a Day As Needed for Dizziness. prn 30 tablet 3    • Multiple Vitamins-Minerals (MULTIVITAL) tablet Take 1 tablet by mouth Daily.      • omeprazole (priLOSEC) 40 MG capsule TAKE 1 CAPSULE BY MOUTH EVERY DAY 90 capsule 1    • ondansetron (ZOFRAN) 4 MG  tablet Take 1 tablet by mouth Every 12 (Twelve) Hours As Needed for Nausea or Vomiting. 90 tablet 3    • polyethylene glycol (MIRALAX) packet Take 17 g by mouth 2 (Two) Times a Day. (Patient taking differently: Take 17 g by mouth 2 (Two) Times a Day As Needed.)      • potassium chloride 10 MEQ CR tablet 1 p.o. daily 30 tablet 5    • roflumilast (DALIRESP) 500 MCG tablet tablet Take 1 tablet by mouth Daily. 90 tablet 0    • rOPINIRole (REQUIP) 2 MG tablet TAKE 1 TABLET BY MOUTH EVERY DAY EVERY NIGHT 90 tablet 2    • zaleplon (SONATA) 10 MG capsule TAKE 1 CAPSULE BY MOUTH EVERY NIGHT 30 capsule 3    • fluconazole (DIFLUCAN) 150 MG tablet 1 p.o. as needed yeast day 1, 4, 7 3 tablet 0    • Nebulizer device 1 Units 4 (Four) Times a Day As Needed (Wheezing). J44.1 j20.8 1 each 0    • O2 (OXYGEN) Inhale 3 L/min Continuous.      • Omega-3 Fatty Acids (fish oil) 1000 MG capsule capsule Take  by mouth Every Night.      • sertraline (ZOLOFT) 100 MG tablet TAKE 1 TABLET BY MOUTH EVERY DAY 90 tablet 1    • tiotropium (Spiriva HandiHaler) 18 MCG per inhalation capsule Place 1 capsule into inhaler and inhale Daily. 90 capsule 3    • valACYclovir (Valtrex) 1000 MG tablet 1 p.o. 3 times daily x1 week 21 tablet 0        Allergies:  Allergies   Allergen Reactions   • Bupropion Itching   • Cephalexin Itching     Tolerated piperacillin/tazobactam   • Metoprolol Itching       Past Social History:  Social History     Socioeconomic History   • Marital status:      Spouse name: Not on file   • Number of children: 2   • Years of education: Not on file   • Highest education level: Not on file   Tobacco Use   • Smoking status: Former Smoker     Quit date:      Years since quittin.4   • Smokeless tobacco: Never Used   Substance and Sexual Activity   • Alcohol use: No     Comment: caffeine use   • Drug use: No   • Sexual activity: Defer       Past Family History: History reviewed. No pertinent family history.   Family History    Adopted: Yes   Problem Relation Age of Onset   • No Known Problems Mother    • No Known Problems Father        Review of Systems   All other systems reviewed and are negative.  Positive were in HPI        Objective:     Objective:  Temp:  [97 °F (36.1 °C)-101.7 °F (38.7 °C)] 97 °F (36.1 °C)  Heart Rate:  [74-98] 81  Resp:  [14-22] 18  BP: (120-163)/(55-71) 120/55    Intake/Output Summary (Last 24 hours) at 6/15/2021 1008  Last data filed at 6/15/2021 0948  Gross per 24 hour   Intake 50 ml   Output 1250 ml   Net -1200 ml     Body mass index is 21.21 kg/m².      06/14/21  1648 06/15/21  0500   Weight: 63.5 kg (140 lb) 61.4 kg (135 lb 6.4 oz)           Physical Exam:   Vitals reviewed.   Constitutional:       Appearance: Well-developed.   Eyes:      Conjunctiva/sclera: Conjunctivae normal.   HENT:      Head: Normocephalic.   Pulmonary:      Breath sounds: Decreased breath sounds present.   Cardiovascular:      Normal rate. Regular rhythm. Normal S1. Normal S2.      Murmurs: There is no murmur.      No gallop. No click. No rub.   Pulses:     Intact distal pulses.   Edema:     Peripheral edema absent.   Abdominal:      General: Bowel sounds are normal.      Palpations: Abdomen is soft.   Musculoskeletal: Normal range of motion.      Cervical back: Normal range of motion. Skin:     General: Skin is warm and dry.   Neurological:      Mental Status: Alert and oriented to person, place, and time.   Psychiatric:         Behavior: Behavior normal.          Labs:   Lab Review:     Results from last 7 days   Lab Units 06/15/21  0816 06/14/21  1723   SODIUM mmol/L 138 139   POTASSIUM mmol/L 4.4 2.8*   CHLORIDE mmol/L 98 96*   CO2 mmol/L 31.0* 31.4*   BUN mg/dL 14 11   CREATININE mg/dL 0.77 0.83   GLUCOSE mg/dL 115* 127*   CALCIUM mg/dL 9.0 9.1   AST (SGOT) U/L  --  21   ALT (SGPT) U/L  --  14     Results from last 7 days   Lab Units 06/14/21  1723   TROPONIN T ng/mL 0.019     Results from last 7 days   Lab Units 06/15/21  0854    WBC 10*3/mm3 8.50   HEMOGLOBIN g/dL 9.1*   HEMATOCRIT % 27.2*   PLATELETS 10*3/mm3 171     Results from last 7 days   Lab Units 06/14/21  1723   INR  1.09         Results from last 7 days   Lab Units 06/15/21  0816   MAGNESIUM mg/dL 2.6*         Results from last 7 days   Lab Units 06/14/21  1723   PROBNP pg/mL 3,638.0*                 PREVIOUS EKG:          EKG:             Assessment:       COPD exacerbation (CMS/[facility])        Plan:     1.  Patient presents with fever.  Patient had tricuspid repair as well as a prosthetic mitral valve.  Concern with fever always is infection of the valves.  We will check a transthoracic echo and see what is visible.  Patient did test positive for coronavirus which obviously could be an etiology of her fever.  We will see what develops.  Unless there is definitive evidence I would not rush to do a CHIO unless she does not defervesce.  2.  Hypertension blood pressure is currently stable.  3.  COPD exacerbation positive coronavirus.  4.  We will follow along and see what evolves.    Thank you for allowing me to participate in the care of Paz Browne. Feel free to contact me directly with any further questions or concerns.    Andreas Tyson MD  [facility]  06/15/21  10:08 EDT

## 2021-06-16 LAB
GLUCOSE BLDC GLUCOMTR-MCNC: 117 MG/DL (ref 70–130)
GLUCOSE BLDC GLUCOMTR-MCNC: 145 MG/DL (ref 70–130)
GLUCOSE BLDC GLUCOMTR-MCNC: 153 MG/DL (ref 70–130)
GLUCOSE BLDC GLUCOMTR-MCNC: 169 MG/DL (ref 70–130)
QT INTERVAL: 376 MS

## 2021-06-16 PROCEDURE — 63710000001 INSULIN LISPRO (HUMAN) PER 5 UNITS: Performed by: INTERNAL MEDICINE

## 2021-06-16 PROCEDURE — 99232 SBSQ HOSP IP/OBS MODERATE 35: CPT | Performed by: INTERNAL MEDICINE

## 2021-06-16 PROCEDURE — 63710000001 ONDANSETRON PER 8 MG: Performed by: INTERNAL MEDICINE

## 2021-06-16 PROCEDURE — 94799 UNLISTED PULMONARY SVC/PX: CPT

## 2021-06-16 PROCEDURE — 25010000002 HEPARIN (PORCINE) PER 1000 UNITS: Performed by: INTERNAL MEDICINE

## 2021-06-16 PROCEDURE — 25010000002 CEFEPIME PER 500 MG: Performed by: INTERNAL MEDICINE

## 2021-06-16 PROCEDURE — 82962 GLUCOSE BLOOD TEST: CPT

## 2021-06-16 PROCEDURE — 87040 BLOOD CULTURE FOR BACTERIA: CPT | Performed by: INTERNAL MEDICINE

## 2021-06-16 PROCEDURE — 25010000002 METHYLPREDNISOLONE PER 40 MG: Performed by: INTERNAL MEDICINE

## 2021-06-16 PROCEDURE — 93005 ELECTROCARDIOGRAM TRACING: CPT | Performed by: INTERNAL MEDICINE

## 2021-06-16 RX ADMIN — GUAIFENESIN 600 MG: 600 TABLET, EXTENDED RELEASE ORAL at 08:52

## 2021-06-16 RX ADMIN — IPRATROPIUM BROMIDE AND ALBUTEROL SULFATE 3 ML: 2.5; .5 SOLUTION RESPIRATORY (INHALATION) at 10:55

## 2021-06-16 RX ADMIN — ASPIRIN 81 MG: 81 TABLET, FILM COATED ORAL at 08:52

## 2021-06-16 RX ADMIN — ATORVASTATIN CALCIUM 40 MG: 20 TABLET, FILM COATED ORAL at 08:52

## 2021-06-16 RX ADMIN — CEFEPIME HYDROCHLORIDE 2 G: 2 INJECTION, POWDER, FOR SOLUTION INTRAVENOUS at 13:14

## 2021-06-16 RX ADMIN — ZOLPIDEM TARTRATE 5 MG: 5 TABLET ORAL at 23:18

## 2021-06-16 RX ADMIN — AMLODIPINE BESYLATE 10 MG: 5 TABLET ORAL at 08:52

## 2021-06-16 RX ADMIN — INSULIN LISPRO 2 UNITS: 100 INJECTION, SOLUTION INTRAVENOUS; SUBCUTANEOUS at 12:19

## 2021-06-16 RX ADMIN — HYDROCODONE BITARTRATE AND ACETAMINOPHEN 1 TABLET: 5; 325 TABLET ORAL at 13:19

## 2021-06-16 RX ADMIN — MAGNESIUM OXIDE 400 MG (241.3 MG MAGNESIUM) TABLET 400 MG: TABLET at 08:52

## 2021-06-16 RX ADMIN — METHYLPREDNISOLONE SODIUM SUCCINATE 40 MG: 40 INJECTION, POWDER, FOR SOLUTION INTRAMUSCULAR; INTRAVENOUS at 21:11

## 2021-06-16 RX ADMIN — ONDANSETRON HYDROCHLORIDE 4 MG: 4 TABLET, FILM COATED ORAL at 13:14

## 2021-06-16 RX ADMIN — ROPINIROLE 2 MG: 2 TABLET, FILM COATED ORAL at 21:10

## 2021-06-16 RX ADMIN — IPRATROPIUM BROMIDE AND ALBUTEROL SULFATE 3 ML: 2.5; .5 SOLUTION RESPIRATORY (INHALATION) at 19:54

## 2021-06-16 RX ADMIN — IPRATROPIUM BROMIDE AND ALBUTEROL SULFATE 3 ML: 2.5; .5 SOLUTION RESPIRATORY (INHALATION) at 15:05

## 2021-06-16 RX ADMIN — CARVEDILOL 6.25 MG: 6.25 TABLET, FILM COATED ORAL at 08:52

## 2021-06-16 RX ADMIN — ROFLUMILAST 500 MCG: 500 TABLET ORAL at 08:52

## 2021-06-16 RX ADMIN — METHYLPREDNISOLONE SODIUM SUCCINATE 40 MG: 40 INJECTION, POWDER, FOR SOLUTION INTRAMUSCULAR; INTRAVENOUS at 08:51

## 2021-06-16 RX ADMIN — HEPARIN SODIUM 5000 UNITS: 5000 INJECTION INTRAVENOUS; SUBCUTANEOUS at 08:51

## 2021-06-16 RX ADMIN — CETIRIZINE HYDROCHLORIDE ALLERGY 10 MG: 10 TABLET ORAL at 08:52

## 2021-06-16 RX ADMIN — HYDROCODONE BITARTRATE AND ACETAMINOPHEN 1 TABLET: 5; 325 TABLET ORAL at 21:11

## 2021-06-16 RX ADMIN — CEFEPIME HYDROCHLORIDE 2 G: 2 INJECTION, POWDER, FOR SOLUTION INTRAVENOUS at 21:10

## 2021-06-16 RX ADMIN — CARVEDILOL 6.25 MG: 6.25 TABLET, FILM COATED ORAL at 18:31

## 2021-06-16 RX ADMIN — ZOLPIDEM TARTRATE 5 MG: 5 TABLET ORAL at 01:48

## 2021-06-16 RX ADMIN — INSULIN LISPRO 2 UNITS: 100 INJECTION, SOLUTION INTRAVENOUS; SUBCUTANEOUS at 18:31

## 2021-06-16 RX ADMIN — PANTOPRAZOLE SODIUM 40 MG: 40 TABLET, DELAYED RELEASE ORAL at 08:51

## 2021-06-16 RX ADMIN — HYDROCODONE BITARTRATE AND ACETAMINOPHEN 1 TABLET: 5; 325 TABLET ORAL at 01:48

## 2021-06-16 RX ADMIN — CYCLOBENZAPRINE 10 MG: 10 TABLET, FILM COATED ORAL at 21:10

## 2021-06-16 RX ADMIN — IPRATROPIUM BROMIDE AND ALBUTEROL SULFATE 3 ML: 2.5; .5 SOLUTION RESPIRATORY (INHALATION) at 07:35

## 2021-06-16 RX ADMIN — HEPARIN SODIUM 5000 UNITS: 5000 INJECTION INTRAVENOUS; SUBCUTANEOUS at 23:18

## 2021-06-16 RX ADMIN — POLYETHYLENE GLYCOL 3350 17 G: 17 POWDER, FOR SOLUTION ORAL at 18:38

## 2021-06-16 RX ADMIN — HEPARIN SODIUM 5000 UNITS: 5000 INJECTION INTRAVENOUS; SUBCUTANEOUS at 18:31

## 2021-06-16 RX ADMIN — GUAIFENESIN 600 MG: 600 TABLET, EXTENDED RELEASE ORAL at 23:18

## 2021-06-16 NOTE — PROGRESS NOTES
Hospital Follow Up    LOS:  LOS: 2 days   Patient Name: Paz Browne  Age/Sex: 66 y.o. female  : 1955  MRN: [id number]    Day of Service: 21   Length of Stay: 2  Encounter Provider: Andreas Tyson MD  Place of Service: [facility] CARDIOLOGY  Patient Care Team:  Javan Martinez MD as PCP - General  Javan Martinez MD as PCP - Family Medicine  [facility] as Consulting Physician (Pulmonary Disease)  Cleveland Devine MD as Consulting Physician (Gastroenterology)  Earnest Gan MD as Consulting Physician (Cardiology)  Daniela Shin MD PhD as Consulting Physician (Hematology and Oncology)  Javan Martinez MD as Referring Physician (Internal Medicine)    Subjective:     Chief Complaint: Shortness of breath.    Interval History: Patient says she is about the same.    Objective:     Objective:  Temp:  [98.2 °F (36.8 °C)-98.3 °F (36.8 °C)] 98.2 °F (36.8 °C)  Heart Rate:  [] 81  Resp:  [16-22] 18  BP: (120-147)/(59-92) 128/76     Intake/Output Summary (Last 24 hours) at 2021 1322  Last data filed at 2021 0900  Gross per 24 hour   Intake 840 ml   Output 2050 ml   Net -1210 ml     Body mass index is 21.9 kg/m².      06/15/21  0500 06/15/21  1409 21  0637   Weight: 61.4 kg (135 lb 6.4 oz) 61.2 kg (135 lb) 63.4 kg (139 lb 12.8 oz)     Weight change: -2.268 kg (-5 lb)      Physical Exam:   General : Alert, cooperative, in no acute distress.  Neuro: Alert,cooperative and oriented.  Lungs: CTAB. Normal respiratory effort and rate.  CV: irregular rate and rhythm, normal S1 and S2, no murmurs, gallops or rubs.  ABD: Soft, nontender, nondistended. Positive bowel sounds.  Extr: No edema or cyanosis, moves all extremities.    Lab Review:   Results from last 7 days   Lab Units 06/15/21  0816 21  1723   SODIUM mmol/L 138 139   POTASSIUM mmol/L 4.4 2.8*   CHLORIDE mmol/L 98 96*   CO2 mmol/L 31.0* 31.4*   BUN mg/dL 14 11   CREATININE  mg/dL 0.77 0.83   GLUCOSE mg/dL 115* 127*   CALCIUM mg/dL 9.0 9.1   AST (SGOT) U/L  --  21   ALT (SGPT) U/L  --  14     Results from last 7 days   Lab Units 06/14/21  1723   TROPONIN T ng/mL 0.019     Results from last 7 days   Lab Units 06/15/21  0817 06/14/21  1723   WBC 10*3/mm3 8.50 7.76   HEMOGLOBIN g/dL 9.1* 9.8*   HEMATOCRIT % 27.2* 29.7*   PLATELETS 10*3/mm3 171 179     Results from last 7 days   Lab Units 06/14/21  1723   INR  1.09     Results from last 7 days   Lab Units 06/15/21  0816 06/14/21  1723   MAGNESIUM mg/dL 2.6* 1.5*           Invalid input(s): LDLCALC  Results from last 7 days   Lab Units 06/14/21  1723   PROBNP pg/mL 3,638.0*           Current Medications:   Scheduled Meds:amLODIPine, 10 mg, Oral, Daily  aspirin, 81 mg, Oral, Daily  atorvastatin, 40 mg, Oral, Daily  carvedilol, 6.25 mg, Oral, BID With Meals  cefepime, 2 g, Intravenous, Once  cefepime, 2 g, Intravenous, Q8H  cetirizine, 10 mg, Oral, Daily  cyclobenzaprine, 10 mg, Oral, Nightly  guaiFENesin, 600 mg, Oral, BID  heparin (porcine), 5,000 Units, Subcutaneous, Q8H  insulin lispro, 0-9 Units, Subcutaneous, TID AC  ipratropium-albuterol, 3 mL, Nebulization, Q4H While Awake - RT  magnesium oxide, 400 mg, Oral, Daily  methylPREDNISolone sodium succinate, 40 mg, Intravenous, Q12H  pantoprazole, 40 mg, Oral, QAM  roflumilast, 500 mcg, Oral, Daily  rOPINIRole, 2 mg, Oral, Nightly      Continuous Infusions:     Allergies:  Allergies   Allergen Reactions   • Bupropion Itching   • Cephalexin Itching     Tolerated piperacillin/tazobactam, ampicillin, cefdinir, and ceftriaxone   • Metoprolol Itching       Assessment:       COPD exacerbation (CMS/HCC)        Plan:   1.  Appreciate Dr. Clayton's input.  good view of the bioprosthetic valve with no definitive vegetation.  Plan per him.  2.  COPD  3.  On remark on her echocardiogram her pulmonary pressures have markedly elevated.  Will defer to pulmonology.  4.  We will see patient on an  as-needed basis have her follow-up 4 to 6 weeks after discharge.  Please call if there is anything else we can assist with.        Andreas Tyson MD  06/16/21  13:22 EDT

## 2021-06-16 NOTE — PROGRESS NOTES
LOS: 2 days     Chief Complaint:  Follow-up Strep septicemia    Interval History:  Mild dry cough. SOA with exertion. No assc fever. Tolerating ceftriaxone w/o rashes. TTE was negative for vegetations    ROS: no n/v/d    Vital Signs  Temp:  [98.2 °F (36.8 °C)-98.8 °F (37.1 °C)] 98.2 °F (36.8 °C)  Heart Rate:  [] 89  Resp:  [14-22] 16  BP: (120-147)/(59-92) 128/76    Physical Exam:  General: awake, alert, NAD   Eyes: no scleral icterus  ENT: no thrush, dentures  Cardiovascular: NR, RR, no murmur  Respiratory: no significant rales or wheezing; normal work of breathing on 2L NC  GI: Abdomen is soft, non-tender, non-distended  :  no Vargas catheter  Musculoskeletal: normal musculature  Skin: No rashes  Neurological: Alert and oriented x 3  Psychiatric: Normal mood and affect     Antibiotics:  •  cefTRIAXone (ROCEPHIN) IVPB 2 g, 2 g, Intravenous, Q24H    LABS:  Micro reviewed today  Lab Results   Component Value Date    WBC 8.50 06/15/2021    HGB 9.1 (L) 06/15/2021    HCT 27.2 (L) 06/15/2021    MCV 86.6 06/15/2021     06/15/2021     Lab Results   Component Value Date    GLUCOSE 115 (H) 06/15/2021    BUN 14 06/15/2021    CREATININE 0.77 06/15/2021    EGFRIFNONA 75 06/15/2021    BCR 18.2 06/15/2021    CO2 31.0 (H) 06/15/2021    CALCIUM 9.0 06/15/2021    ALBUMIN 4.30 06/14/2021    AST 21 06/14/2021    ALT 14 06/14/2021     Microbiology:  6/14 RPP: + coronavirus NL63  6/14 BCx: Strep species in 2/2 sets (same arm, same time listed)  6/14 SpCx: normal juan daniel    Radiology (personally reviewed report):   TTE with good view of the bioprosthetic MV and it was normal    CTA chest negative for PE; + atypical infection    Assessment/Plan   1. COPD exacerbation due to coronavirus NL63  2. Strep septicemia  3. Chronic hypoxic respiratory failure (3L NC at home)  4. Mitral valve replaced (8/2014)  5. Tricuspid valve repair (8/2014)     For non-COVID coronavirus and COPD exacerbation, she has fairly minimal symptoms and is  on her baseline O2. Continue supportive care. She is on steroids so WBC may rise.      For Strep septicemia, continue ceftriaxone 2 g IV q24h while awaiting sensitivities. TTE showed a good view of the bioprosthetic MV and there were no vegetations which is great news. Repeat blood cultures were drawn this AM to document sterility.     I anticipate a 10 day course of antibiotics (IV vs PO TBD) w/ stop date 6/24/21. I would then have her repeat blood cultures about one week after finishing the antibiotic course.      ID will follow.

## 2021-06-16 NOTE — PROGRESS NOTES
"  Daily Progress Note.   [address]  6/16/2021    Patient:  Name:  Paz Browne  MRN:  [id number]  1955  66 y.o.  female         CC: Shortness of breath    Interval /ROS:  Patient still with some shortness of breath and cough.  No fever.  No worsening shortness of breath or hypoxia.  She is on 2 L 94% at present time.  No fever since admission.  She is awake alert and oriented.  No lethargy  PMFSSH: no change    Physical Exam:  /76 (BP Location: Left arm, Patient Position: Lying)   Pulse 89   Temp 98.2 °F (36.8 °C) (Oral)   Resp 16   Ht 170.2 cm (67\")   Wt 63.4 kg (139 lb 12.8 oz)   SpO2 97%   BMI 21.90 kg/m²   Body mass index is 21.9 kg/m².    Intake/Output Summary (Last 24 hours) at 6/16/2021 0850  Last data filed at 6/16/2021 0546  Gross per 24 hour   Intake 480 ml   Output 2700 ml   Net -2220 ml     General appearance: Nontoxic, conversant   Eyes: anicteric sclerae, moist conjunctivae; no lid-lag; PERRLA  HENT: Atraumatic; oropharynx clear with moist mucous membranes and no mucosal ulcerations; normal hard and soft palate  Neck: Trachea midline; FROM, supple, no thyromegaly or lymphadenopathy  Lungs: No expiratory wheezing bilaterally today no rhonchi, with normal respiratory effort and no intercostal retractions  CV: RRR, no MRGs   Abdomen: Soft, non-tender; no masses or HSM  Extremities: No peripheral edema or extremity lymphadenopathy  Skin: Warm well perfused no diffuse rash  Psych: Appropriate affect, alert and oriented to person, place and time  Neuro moves all ext, cns 2-12 intact sensation intact    Data Review:  Notable Labs:  Results from last 7 days   Lab Units 06/15/21  0817 06/14/21  1723   WBC 10*3/mm3 8.50 7.76   HEMOGLOBIN g/dL 9.1* 9.8*   PLATELETS 10*3/mm3 171 179     Results from last 7 days   Lab Units 06/15/21  0816 06/14/21  1723   SODIUM mmol/L 138 139   POTASSIUM mmol/L 4.4 2.8*   CHLORIDE mmol/L 98 96*   CO2 mmol/L 31.0* 31.4*   BUN mg/dL 14 " 11   CREATININE mg/dL 0.77 0.83   GLUCOSE mg/dL 115* 127*   CALCIUM mg/dL 9.0 9.1   MAGNESIUM mg/dL 2.6* 1.5*   PHOSPHORUS mg/dL 2.6  --    Estimated Creatinine Clearance: 67.4 mL/min (by C-G formula based on SCr of 0.77 mg/dL).    Results from last 7 days   Lab Units 06/15/21  0817 06/15/21  0816 06/14/21  1723   AST (SGOT) U/L  --   --  21   ALT (SGPT) U/L  --   --  14   PROCALCITONIN ng/mL  --  0.39* 0.32*   LACTATE mmol/L  --   --  1.2   D DIMER QUANT MCGFEU/mL  --   --  0.71*   PLATELETS 10*3/mm3 171  --  179       Results from last 7 days   Lab Units 06/15/21  0417   PH, ARTERIAL pH units 7.389   PCO2, ARTERIAL mm Hg 54.1*   PO2 ART mm Hg 102.4*   HCO3 ART mmol/L 32.7*       Imaging:  Reviewed chest images personally from past 3 days    Scheduled meds:    amLODIPine, 10 mg, Oral, Daily  aspirin, 81 mg, Oral, Daily  atorvastatin, 40 mg, Oral, Daily  carvedilol, 6.25 mg, Oral, BID With Meals  cefTRIAXone, 2 g, Intravenous, Q24H  cetirizine, 10 mg, Oral, Daily  cyclobenzaprine, 10 mg, Oral, Nightly  guaiFENesin, 600 mg, Oral, BID  heparin (porcine), 5,000 Units, Subcutaneous, Q8H  insulin lispro, 0-9 Units, Subcutaneous, TID AC  ipratropium-albuterol, 3 mL, Nebulization, Q4H While Awake - RT  magnesium oxide, 400 mg, Oral, Daily  methylPREDNISolone sodium succinate, 40 mg, Intravenous, Q12H  pantoprazole, 40 mg, Oral, QAM  roflumilast, 500 mcg, Oral, Daily  rOPINIRole, 2 mg, Oral, Nightly        ASSESSMENT  /  PLAN:  COPD exacerbation, ? mild  Coronavirus (Non-Covid-19) infection  Chronic hypoxia  Acute hypokalemia improved  Uncontrolled hypertension  Acute diastolic CHF  PHT - with normal PCWP (2016)  Anemia  Hx MV replacement, tissue valve  Afib paroxysmal, not on AC  Hx severe GIB      +BC strep in setting of valves, ID following, abx adjusted and tolerating cephalosporin wihtout rash ( ceftriaxone)  Repeat BCs TTE with good view of valve neg for veg    +NONcovid Coronavirus - standard isolation per hospital  protocol - covid was negative. Supportive care      + d dimer, ct angio negative  Cont iv methylpred  Cont scheduled duonebs  Cont daliresp  Will need outpatient follow up for irregular opacities but will need time for evaluation after acute viral pna    Cont coreg, lipitor, aspirin norvasc cards following  At some point will need right heart cath in the future likely not best done during acute inpatient hospitalization with bacteremia       PPI  dvt proph with heparin    Pt of Dr Melo with out group.         Bubba Prince MD  [facility]  06/16/21  956 EDT

## 2021-06-17 LAB
ALBUMIN SERPL-MCNC: 3.7 G/DL (ref 3.5–5.2)
ALBUMIN/GLOB SERPL: 1.5 G/DL
ALP SERPL-CCNC: 64 U/L (ref 39–117)
ALT SERPL W P-5'-P-CCNC: 16 U/L (ref 1–33)
ANION GAP SERPL CALCULATED.3IONS-SCNC: 10.2 MMOL/L (ref 5–15)
AST SERPL-CCNC: 21 U/L (ref 1–32)
BACTERIA SPEC AEROBE CULT: ABNORMAL
BACTERIA SPEC AEROBE CULT: ABNORMAL
BACTERIA SPEC RESP CULT: ABNORMAL
BACTERIA SPEC RESP CULT: ABNORMAL
BASOPHILS # BLD AUTO: 0.01 10*3/MM3 (ref 0–0.2)
BASOPHILS NFR BLD AUTO: 0.1 % (ref 0–1.5)
BILIRUB SERPL-MCNC: 0.3 MG/DL (ref 0–1.2)
BUN SERPL-MCNC: 17 MG/DL (ref 8–23)
BUN/CREAT SERPL: 24.6 (ref 7–25)
CALCIUM SPEC-SCNC: 9.2 MG/DL (ref 8.6–10.5)
CHLORIDE SERPL-SCNC: 100 MMOL/L (ref 98–107)
CO2 SERPL-SCNC: 29.8 MMOL/L (ref 22–29)
CREAT SERPL-MCNC: 0.69 MG/DL (ref 0.57–1)
DEPRECATED RDW RBC AUTO: 44.2 FL (ref 37–54)
EOSINOPHIL # BLD AUTO: 0 10*3/MM3 (ref 0–0.4)
EOSINOPHIL NFR BLD AUTO: 0 % (ref 0.3–6.2)
ERYTHROCYTE [DISTWIDTH] IN BLOOD BY AUTOMATED COUNT: 13.6 % (ref 12.3–15.4)
GFR SERPL CREATININE-BSD FRML MDRD: 85 ML/MIN/1.73
GLOBULIN UR ELPH-MCNC: 2.5 GM/DL
GLUCOSE BLDC GLUCOMTR-MCNC: 114 MG/DL (ref 70–130)
GLUCOSE BLDC GLUCOMTR-MCNC: 127 MG/DL (ref 70–130)
GLUCOSE BLDC GLUCOMTR-MCNC: 132 MG/DL (ref 70–130)
GLUCOSE BLDC GLUCOMTR-MCNC: 133 MG/DL (ref 70–130)
GLUCOSE SERPL-MCNC: 134 MG/DL (ref 65–99)
GRAM STN SPEC: ABNORMAL
HCT VFR BLD AUTO: 27.4 % (ref 34–46.6)
HGB BLD-MCNC: 8.7 G/DL (ref 12–15.9)
IMM GRANULOCYTES # BLD AUTO: 0.07 10*3/MM3 (ref 0–0.05)
IMM GRANULOCYTES NFR BLD AUTO: 0.8 % (ref 0–0.5)
LYMPHOCYTES # BLD AUTO: 0.8 10*3/MM3 (ref 0.7–3.1)
LYMPHOCYTES NFR BLD AUTO: 9.2 % (ref 19.6–45.3)
MCH RBC QN AUTO: 28.3 PG (ref 26.6–33)
MCHC RBC AUTO-ENTMCNC: 31.8 G/DL (ref 31.5–35.7)
MCV RBC AUTO: 89.3 FL (ref 79–97)
MONOCYTES # BLD AUTO: 0.39 10*3/MM3 (ref 0.1–0.9)
MONOCYTES NFR BLD AUTO: 4.5 % (ref 5–12)
NEUTROPHILS NFR BLD AUTO: 7.46 10*3/MM3 (ref 1.7–7)
NEUTROPHILS NFR BLD AUTO: 85.4 % (ref 42.7–76)
NRBC BLD AUTO-RTO: 0 /100 WBC (ref 0–0.2)
PHOSPHATE SERPL-MCNC: 3.5 MG/DL (ref 2.5–4.5)
PLATELET # BLD AUTO: 234 10*3/MM3 (ref 140–450)
PMV BLD AUTO: 11 FL (ref 6–12)
POTASSIUM SERPL-SCNC: 3.8 MMOL/L (ref 3.5–5.2)
PROT SERPL-MCNC: 6.2 G/DL (ref 6–8.5)
QT INTERVAL: 336 MS
RBC # BLD AUTO: 3.07 10*6/MM3 (ref 3.77–5.28)
SODIUM SERPL-SCNC: 140 MMOL/L (ref 136–145)
WBC # BLD AUTO: 8.73 10*3/MM3 (ref 3.4–10.8)

## 2021-06-17 PROCEDURE — 93010 ELECTROCARDIOGRAM REPORT: CPT | Performed by: INTERNAL MEDICINE

## 2021-06-17 PROCEDURE — 93005 ELECTROCARDIOGRAM TRACING: CPT | Performed by: INTERNAL MEDICINE

## 2021-06-17 PROCEDURE — 80053 COMPREHEN METABOLIC PANEL: CPT | Performed by: INTERNAL MEDICINE

## 2021-06-17 PROCEDURE — 94799 UNLISTED PULMONARY SVC/PX: CPT

## 2021-06-17 PROCEDURE — 84100 ASSAY OF PHOSPHORUS: CPT | Performed by: INTERNAL MEDICINE

## 2021-06-17 PROCEDURE — 82962 GLUCOSE BLOOD TEST: CPT

## 2021-06-17 PROCEDURE — 85025 COMPLETE CBC W/AUTO DIFF WBC: CPT | Performed by: INTERNAL MEDICINE

## 2021-06-17 PROCEDURE — 25010000002 CEFEPIME PER 500 MG: Performed by: INTERNAL MEDICINE

## 2021-06-17 PROCEDURE — 99232 SBSQ HOSP IP/OBS MODERATE 35: CPT | Performed by: INTERNAL MEDICINE

## 2021-06-17 PROCEDURE — 63710000001 PREDNISONE PER 1 MG: Performed by: INTERNAL MEDICINE

## 2021-06-17 PROCEDURE — 25010000002 HEPARIN (PORCINE) PER 1000 UNITS: Performed by: INTERNAL MEDICINE

## 2021-06-17 PROCEDURE — 25010000002 METHYLPREDNISOLONE PER 40 MG: Performed by: INTERNAL MEDICINE

## 2021-06-17 RX ORDER — PREDNISONE 20 MG/1
20 TABLET ORAL 2 TIMES DAILY WITH MEALS
Status: DISCONTINUED | OUTPATIENT
Start: 2021-06-17 | End: 2021-06-18 | Stop reason: HOSPADM

## 2021-06-17 RX ADMIN — CEFEPIME HYDROCHLORIDE 2 G: 2 INJECTION, POWDER, FOR SOLUTION INTRAVENOUS at 20:53

## 2021-06-17 RX ADMIN — CETIRIZINE HYDROCHLORIDE ALLERGY 10 MG: 10 TABLET ORAL at 08:22

## 2021-06-17 RX ADMIN — HEPARIN SODIUM 5000 UNITS: 5000 INJECTION INTRAVENOUS; SUBCUTANEOUS at 08:22

## 2021-06-17 RX ADMIN — HEPARIN SODIUM 5000 UNITS: 5000 INJECTION INTRAVENOUS; SUBCUTANEOUS at 17:15

## 2021-06-17 RX ADMIN — MAGNESIUM OXIDE 400 MG (241.3 MG MAGNESIUM) TABLET 400 MG: TABLET at 08:21

## 2021-06-17 RX ADMIN — ZOLPIDEM TARTRATE 5 MG: 5 TABLET ORAL at 23:23

## 2021-06-17 RX ADMIN — ROPINIROLE 2 MG: 2 TABLET, FILM COATED ORAL at 20:54

## 2021-06-17 RX ADMIN — HYDROCODONE BITARTRATE AND ACETAMINOPHEN 1 TABLET: 5; 325 TABLET ORAL at 13:39

## 2021-06-17 RX ADMIN — ASPIRIN 81 MG: 81 TABLET, FILM COATED ORAL at 08:21

## 2021-06-17 RX ADMIN — METHYLPREDNISOLONE SODIUM SUCCINATE 40 MG: 40 INJECTION, POWDER, FOR SOLUTION INTRAMUSCULAR; INTRAVENOUS at 08:22

## 2021-06-17 RX ADMIN — CARVEDILOL 6.25 MG: 6.25 TABLET, FILM COATED ORAL at 17:16

## 2021-06-17 RX ADMIN — AMLODIPINE BESYLATE 10 MG: 5 TABLET ORAL at 08:22

## 2021-06-17 RX ADMIN — GUAIFENESIN 600 MG: 600 TABLET, EXTENDED RELEASE ORAL at 20:53

## 2021-06-17 RX ADMIN — CYCLOBENZAPRINE 10 MG: 10 TABLET, FILM COATED ORAL at 20:53

## 2021-06-17 RX ADMIN — IPRATROPIUM BROMIDE AND ALBUTEROL SULFATE 3 ML: 2.5; .5 SOLUTION RESPIRATORY (INHALATION) at 16:50

## 2021-06-17 RX ADMIN — CARVEDILOL 6.25 MG: 6.25 TABLET, FILM COATED ORAL at 08:22

## 2021-06-17 RX ADMIN — HEPARIN SODIUM 5000 UNITS: 5000 INJECTION INTRAVENOUS; SUBCUTANEOUS at 23:30

## 2021-06-17 RX ADMIN — ROFLUMILAST 500 MCG: 500 TABLET ORAL at 08:22

## 2021-06-17 RX ADMIN — CEFEPIME HYDROCHLORIDE 2 G: 2 INJECTION, POWDER, FOR SOLUTION INTRAVENOUS at 06:25

## 2021-06-17 RX ADMIN — ATORVASTATIN CALCIUM 40 MG: 20 TABLET, FILM COATED ORAL at 08:21

## 2021-06-17 RX ADMIN — IPRATROPIUM BROMIDE AND ALBUTEROL SULFATE 3 ML: 2.5; .5 SOLUTION RESPIRATORY (INHALATION) at 20:38

## 2021-06-17 RX ADMIN — IPRATROPIUM BROMIDE AND ALBUTEROL SULFATE 3 ML: 2.5; .5 SOLUTION RESPIRATORY (INHALATION) at 09:14

## 2021-06-17 RX ADMIN — IPRATROPIUM BROMIDE AND ALBUTEROL SULFATE 3 ML: 2.5; .5 SOLUTION RESPIRATORY (INHALATION) at 01:58

## 2021-06-17 RX ADMIN — CEFEPIME HYDROCHLORIDE 2 G: 2 INJECTION, POWDER, FOR SOLUTION INTRAVENOUS at 13:37

## 2021-06-17 RX ADMIN — HYDROCODONE BITARTRATE AND ACETAMINOPHEN 1 TABLET: 5; 325 TABLET ORAL at 22:00

## 2021-06-17 RX ADMIN — PREDNISONE 20 MG: 20 TABLET ORAL at 18:35

## 2021-06-17 RX ADMIN — HYDROCODONE BITARTRATE AND ACETAMINOPHEN 1 TABLET: 5; 325 TABLET ORAL at 06:28

## 2021-06-17 RX ADMIN — IPRATROPIUM BROMIDE AND ALBUTEROL SULFATE 3 ML: 2.5; .5 SOLUTION RESPIRATORY (INHALATION) at 12:18

## 2021-06-17 RX ADMIN — GUAIFENESIN 600 MG: 600 TABLET, EXTENDED RELEASE ORAL at 08:22

## 2021-06-17 RX ADMIN — PANTOPRAZOLE SODIUM 40 MG: 40 TABLET, DELAYED RELEASE ORAL at 06:25

## 2021-06-17 NOTE — PROGRESS NOTES
"  Daily Progress Note.   [address]  6/17/2021    Patient:  Name:  Paz Browne  MRN:  [id number]  1955  66 y.o.  female         CC: Shortness of breath    Interval /ROS:  Patient says she feels much better less shortness of breath less cough less sputum production.  No pleuritic chest pain.  Overall she says she feels much better.  No hemoptysis.    PMFSSH: no change    Physical Exam:  /66   Pulse 89   Temp 98.2 °F (36.8 °C) (Oral)   Resp 18   Ht 170.2 cm (67\")   Wt 64.2 kg (141 lb 8.6 oz)   SpO2 99%   BMI 22.17 kg/m²   Body mass index is 22.17 kg/m².    Intake/Output Summary (Last 24 hours) at 6/17/2021 1750  Last data filed at 6/16/2021 1831  Gross per 24 hour   Intake 440 ml   Output --   Net 440 ml     General appearance: Nontoxic, conversant   Eyes: anicteric sclerae, moist conjunctivae; no lid-lag; PERRLA  HENT: Atraumatic; oropharynx clear with moist mucous membranes and no mucosal ulcerations; normal hard and soft palate  Neck: Trachea midline; FROM, supple,   Lungs: No  wheezing no rhonchi, with normal respiratory effort and no intercostal retractions  CV: RRR, no MRGs   Abdomen: Soft, non-tender;   Extremities: No peripheral edema or extremity lymphadenopathy  Skin: Warm well perfused no diffuse rash  Psych: Appropriate affect, alert and oriented to person, place and time  Neuro moves all ext, cns 2-12 intact sensation intact    Data Review:  Notable Labs:  Results from last 7 days   Lab Units 06/17/21  0657 06/15/21  0817 06/14/21  1723   WBC 10*3/mm3 8.73 8.50 7.76   HEMOGLOBIN g/dL 8.7* 9.1* 9.8*   PLATELETS 10*3/mm3 234 171 179     Results from last 7 days   Lab Units 06/17/21  0657 06/15/21  0816 06/14/21  1723   SODIUM mmol/L 140 138 139   POTASSIUM mmol/L 3.8 4.4 2.8*   CHLORIDE mmol/L 100 98 96*   CO2 mmol/L 29.8* 31.0* 31.4*   BUN mg/dL 17 14 11   CREATININE mg/dL 0.69 0.77 0.83   GLUCOSE mg/dL 134* 115* 127*   CALCIUM mg/dL 9.2 9.0 9.1   MAGNESIUM " mg/dL  --  2.6* 1.5*   PHOSPHORUS mg/dL 3.5 2.6  --    Estimated Creatinine Clearance: 68.2 mL/min (by C-G formula based on SCr of 0.69 mg/dL).    Results from last 7 days   Lab Units 06/17/21  0657 06/15/21  0817 06/15/21  0816 06/14/21  1723   AST (SGOT) U/L 21  --   --  21   ALT (SGPT) U/L 16  --   --  14   PROCALCITONIN ng/mL  --   --  0.39* 0.32*   LACTATE mmol/L  --   --   --  1.2   D DIMER QUANT MCGFEU/mL  --   --   --  0.71*   PLATELETS 10*3/mm3 234 171  --  179       Results from last 7 days   Lab Units 06/15/21  0417   PH, ARTERIAL pH units 7.389   PCO2, ARTERIAL mm Hg 54.1*   PO2 ART mm Hg 102.4*   HCO3 ART mmol/L 32.7*       Imaging:  Reviewed chest images personally from past 3 days    Scheduled meds:    amLODIPine, 10 mg, Oral, Daily  aspirin, 81 mg, Oral, Daily  atorvastatin, 40 mg, Oral, Daily  carvedilol, 6.25 mg, Oral, BID With Meals  cefepime, 2 g, Intravenous, Q8H  cetirizine, 10 mg, Oral, Daily  cyclobenzaprine, 10 mg, Oral, Nightly  guaiFENesin, 600 mg, Oral, BID  heparin (porcine), 5,000 Units, Subcutaneous, Q8H  insulin lispro, 0-9 Units, Subcutaneous, TID AC  ipratropium-albuterol, 3 mL, Nebulization, Q4H While Awake - RT  magnesium oxide, 400 mg, Oral, Daily  methylPREDNISolone sodium succinate, 40 mg, Intravenous, Q12H  pantoprazole, 40 mg, Oral, QAM  roflumilast, 500 mcg, Oral, Daily  rOPINIRole, 2 mg, Oral, Nightly        ASSESSMENT  /  PLAN:  COPD exacerbation, ? Mild  Strepto norman bacteremia  Coronavirus (Non-Covid-19) infection  Chronic hypoxia  Acute hypokalemia improved  Uncontrolled hypertension  Acute diastolic CHF  PHT - with normal PCWP (2016)  Anemia  Hx MV replacement, tissue valve  Afib paroxysmal, not on AC  Hx severe GIB  Pseudomonas bronchopneumonia      +BC strep in setting of valves, ID following, abx adjusted and tolerating cephalosporin without rash on cefepime Repeat BCs TTE with good view of valve neg for veg per cards.  Likely able to transition to levaquin for      +NONcovid Coronavirus - standard isolation per hospital protocol - covid was negative. Supportive care      + d dimer, ct angio negative  Po pred stop iv methylpred  Cont scheduled duonebs  Cont daliresp  Will need outpatient follow up for irregular opacities but will need time for evaluation after acute viral pna    Cont coreg, lipitor, aspirin norvasc cards following  At some point will need right heart cath in the future likely not best done during acute inpatient hospitalization with bacteremia       PPI  dvt proph with heparin    Pt of Dr Melo with out group.    Home tomorrow?       Bubba Prince MD  [facility]  06/17/21  565x

## 2021-06-17 NOTE — PLAN OF CARE
Problem: Adult Inpatient Plan of Care  Goal: Plan of Care Review  Outcome: Ongoing, Progressing  Flowsheets (Taken 6/17/2021 1555)  Progress: improving  Plan of Care Reviewed With: patient  Outcome Summary: VSS, c.o pain (HA) (see MAR). No signs of distress. Uses O2 2L when asleep, RA while awake. BMx1. Up ad bob to BSC. IS self-administer. SR on the monitor. A&Ox4. Continues on antibiotics. Remains afebrile. Monitoring BG and dosing accordingly  Goal: Patient-Specific Goal (Individualized)  Outcome: Ongoing, Progressing  Goal: Absence of Hospital-Acquired Illness or Injury  Outcome: Ongoing, Progressing  Intervention: Identify and Manage Fall Risk  Recent Flowsheet Documentation  Taken 6/17/2021 1354 by Gera Swartz, RN  Safety Promotion/Fall Prevention:   activity supervised   assistive device/personal items within reach   clutter free environment maintained   fall prevention program maintained   nonskid shoes/slippers when out of bed   room organization consistent   safety round/check completed  Taken 6/17/2021 1200 by Gera Swartz, RN  Safety Promotion/Fall Prevention:   activity supervised   assistive device/personal items within reach   clutter free environment maintained   nonskid shoes/slippers when out of bed   safety round/check completed   room organization consistent   lighting adjusted  Taken 6/17/2021 1012 by Gera Swartz, RN  Safety Promotion/Fall Prevention:   activity supervised   assistive device/personal items within reach   clutter free environment maintained   nonskid shoes/slippers when out of bed   room organization consistent   safety round/check completed  Taken 6/17/2021 0822 by Gera Swartz, RN  Safety Promotion/Fall Prevention:   activity supervised   assistive device/personal items within reach   clutter free environment maintained   nonskid shoes/slippers when out of bed   room organization consistent   safety round/check completed   lighting  adjusted  Intervention: Prevent Skin Injury  Recent Flowsheet Documentation  Taken 6/17/2021 1354 by Gera Swartz RN  Body Position: position changed independently  Skin Protection:   adhesive use limited   transparent dressing maintained   tubing/devices free from skin contact  Taken 6/17/2021 1200 by Gera Swartz RN  Body Position: position changed independently  Taken 6/17/2021 1012 by Gera Swartz RN  Body Position: position changed independently  Taken 6/17/2021 0822 by Gera Swartz RN  Body Position: position changed independently  Skin Protection:   adhesive use limited   transparent dressing maintained   tubing/devices free from skin contact  Intervention: Prevent and Manage VTE (venous thromboembolism) Risk  Recent Flowsheet Documentation  Taken 6/17/2021 1354 by Gera Swartz RN  VTE Prevention/Management: (pt on heparin )   bilateral   dorsiflexion/plantar flexion performed  Taken 6/17/2021 0822 by Gera Swartz RN  VTE Prevention/Management: (pt on heparin )   bilateral   dorsiflexion/plantar flexion performed  Intervention: Prevent Infection  Recent Flowsheet Documentation  Taken 6/17/2021 1354 by Gera Swartz RN  Infection Prevention:   visitors restricted/screened   rest/sleep promoted   single patient room provided  Taken 6/17/2021 1200 by Gera Swartz RN  Infection Prevention:   cohorting utilized   visitors restricted/screened   single patient room provided   rest/sleep promoted  Taken 6/17/2021 1012 by Gera Swartz RN  Infection Prevention:   visitors restricted/screened   single patient room provided   rest/sleep promoted  Taken 6/17/2021 0822 by Gera Swartz RN  Infection Prevention:   visitors restricted/screened   single patient room provided   rest/sleep promoted  Goal: Optimal Comfort and Wellbeing  Outcome: Ongoing, Progressing  Intervention: Provide Person-Centered Care  Recent Flowsheet  Documentation  Taken 6/17/2021 1354 by Gera Swartz RN  Trust Relationship/Rapport: care explained  Taken 6/17/2021 0822 by Gera Swartz RN  Trust Relationship/Rapport: care explained  Goal: Readiness for Transition of Care  Outcome: Ongoing, Progressing     Problem: Fall Injury Risk  Goal: Absence of Fall and Fall-Related Injury  Outcome: Ongoing, Progressing  Intervention: Identify and Manage Contributors to Fall Injury Risk  Recent Flowsheet Documentation  Taken 6/17/2021 1354 by Gera Swartz RN  Medication Review/Management: medications reviewed  Taken 6/17/2021 1200 by Gera Swartz RN  Medication Review/Management: medications reviewed  Taken 6/17/2021 1012 by Gera Swartz RN  Medication Review/Management: medications reviewed  Taken 6/17/2021 0822 by Gera Swartz RN  Medication Review/Management: medications reviewed  Intervention: Promote Injury-Free Environment  Recent Flowsheet Documentation  Taken 6/17/2021 1354 by Gera Swartz RN  Safety Promotion/Fall Prevention:   activity supervised   assistive device/personal items within reach   clutter free environment maintained   fall prevention program maintained   nonskid shoes/slippers when out of bed   room organization consistent   safety round/check completed  Taken 6/17/2021 1200 by Gera Swartz RN  Safety Promotion/Fall Prevention:   activity supervised   assistive device/personal items within reach   clutter free environment maintained   nonskid shoes/slippers when out of bed   safety round/check completed   room organization consistent   lighting adjusted  Taken 6/17/2021 1012 by Gera Swartz RN  Safety Promotion/Fall Prevention:   activity supervised   assistive device/personal items within reach   clutter free environment maintained   nonskid shoes/slippers when out of bed   room organization consistent   safety round/check completed  Taken 6/17/2021 0822 by John  Gera Mcghee, RN  Safety Promotion/Fall Prevention:   activity supervised   assistive device/personal items within reach   clutter free environment maintained   nonskid shoes/slippers when out of bed   room organization consistent   safety round/check completed   lighting adjusted     Problem: Adjustment to Illness COPD (Chronic Obstructive Pulmonary Disease)  Goal: Optimal Chronic Illness Coping  Outcome: Ongoing, Progressing  Intervention: Support and Optimize Psychosocial Response  Recent Flowsheet Documentation  Taken 6/17/2021 1354 by Gera Swartz, RN  Supportive Measures:   active listening utilized   verbalization of feelings encouraged  Family/Support System Care:   support provided   self-care encouraged  Taken 6/17/2021 0822 by Gera Swartz, RN  Supportive Measures:   active listening utilized   verbalization of feelings encouraged  Family/Support System Care:   support provided   self-care encouraged     Problem: Functional Ability Impaired COPD (Chronic Obstructive Pulmonary Disease)  Goal: Optimal Level of Functional [place]  Outcome: Ongoing, Progressing  Intervention: Optimize Functional Ability  Recent Flowsheet Documentation  Taken 6/17/2021 1354 by Gera Swartz, RN  Activity Management:   up ad bob   activity adjusted per tolerance   activity encouraged  Environmental Support:   calm environment promoted   rest periods encouraged   environmental consistency promoted  Taken 6/17/2021 1200 by Gera Swartz, RN  Activity Management:   up ad bob   activity encouraged   activity adjusted per tolerance  Taken 6/17/2021 1012 by Gera Swartz, RN  Activity Management:   up ad bob   activity adjusted per tolerance   activity encouraged  Taken 6/17/2021 0822 by Gera Swartz, RN  Activity Management: up ad bob  Environmental Support:   calm environment promoted   environmental consistency promoted     Problem: Infection COPD (Chronic Obstructive  Pulmonary Disease)  Goal: Absence of Infection Signs and Symptoms  Outcome: Ongoing, Progressing  Intervention: Prevent or Manage Infection  Recent Flowsheet Documentation  Taken 6/17/2021 1354 by Gera Swartz RN  Isolation Precautions: protective environment maintained  Taken 6/17/2021 1200 by Gera Swartz RN  Isolation Precautions: protective environment maintained  Taken 6/17/2021 1012 by Gera Swartz RN  Isolation Precautions: protective environment maintained  Taken 6/17/2021 0822 by Gera Swartz RN  Isolation Precautions: protective environment maintained     Problem: Oral Intake Inadequate COPD (Chronic Obstructive Pulmonary Disease)  Goal: Improved Nutrition Intake  Outcome: Ongoing, Progressing     Problem: Respiratory Compromise COPD (Chronic Obstructive Pulmonary Disease)  Goal: Effective Oxygenation and Ventilation  Outcome: Ongoing, Progressing  Intervention: Promote Airway Secretion Clearance  Recent Flowsheet Documentation  Taken 6/17/2021 1354 by Gera Swartz RN  Activity Management:   up ad bob   activity adjusted per tolerance   activity encouraged  Breathing Techniques/Airway Clearance: deep/controlled cough encouraged  Cough And Deep Breathing: done independently per patient  Taken 6/17/2021 1200 by Gera Swartz RN  Activity Management:   up ad bob   activity encouraged   activity adjusted per tolerance  Taken 6/17/2021 1012 by Gera Swartz RN  Activity Management:   up ad bob   activity adjusted per tolerance   activity encouraged  Taken 6/17/2021 0822 by Gera Swartz RN  Activity Management: up ad bob  Breathing Techniques/Airway Clearance: deep/controlled cough encouraged  Cough And Deep Breathing: done independently per patient  Intervention: Optimize Oxygenation and Ventilation  Recent Flowsheet Documentation  Taken 6/17/2021 1354 by Gera Swartz RN  Fluid/Electrolyte Management: fluids provided  Head  of Bed (HOB): HOB elevated  Airway/Ventilation Management: airway patency maintained  Taken 6/17/2021 1200 by Gera Swartz RN  Head of Bed (HOB): HOB elevated  Taken 6/17/2021 1012 by Gera Swartz RN  Head of Bed (HOB): HOB elevated  Taken 6/17/2021 0822 by Gera Swartz RN  Fluid/Electrolyte Management: fluids provided  Head of Bed (HOB): HOB elevated  Airway/Ventilation Management: airway patency maintained     Problem: Electrolyte Imbalance  Goal: Electrolyte Balance  Outcome: Ongoing, Progressing  Intervention: Monitor and Manage Electrolyte Imbalance  Recent Flowsheet Documentation  Taken 6/17/2021 1354 by Gera Swartz RN  Fluid/Electrolyte Management: fluids provided  Taken 6/17/2021 0822 by Gera Swartz RN  Fluid/Electrolyte Management: fluids provided     Problem: COPD Comorbidity  Goal: Maintenance of COPD Symptom Control  Outcome: Ongoing, Progressing  Intervention: Maintain COPD-Symptom Control  Recent Flowsheet Documentation  Taken 6/17/2021 1354 by Gera Swartz RN  Medication Review/Management: medications reviewed  Taken 6/17/2021 1200 by Gera Swartz RN  Medication Review/Management: medications reviewed  Taken 6/17/2021 1012 by Gera Swartz RN  Medication Review/Management: medications reviewed  Taken 6/17/2021 0822 by Gera Swartz RN  Medication Review/Management: medications reviewed     Problem: Heart Failure Comorbidity  Goal: Maintenance of Heart Failure Symptom Control  Outcome: Ongoing, Progressing  Intervention: Maintain Heart Failure-Management Strategies  Recent Flowsheet Documentation  Taken 6/17/2021 1354 by Gera Swartz RN  Medication Review/Management: medications reviewed  Taken 6/17/2021 1200 by Gera Swartz RN  Medication Review/Management: medications reviewed  Taken 6/17/2021 1012 by Gera Swartz RN  Medication Review/Management: medications reviewed  Taken  6/17/2021 0822 by Gera Swartz RN  Medication Review/Management: medications reviewed     Problem: Hypertension Comorbidity  Goal: Blood Pressure in Desired Range  Outcome: Ongoing, Progressing  Intervention: Maintain Hypertension-Management Strategies  Recent Flowsheet Documentation  Taken 6/17/2021 1354 by Gera Swartz RN  Medication Review/Management: medications reviewed  Taken 6/17/2021 1200 by Gera Swartz RN  Medication Review/Management: medications reviewed  Taken 6/17/2021 1012 by Gera Swartz RN  Medication Review/Management: medications reviewed  Taken 6/17/2021 0822 by Gera Swartz RN  Medication Review/Management: medications reviewed     Problem: Obstructive Sleep Apnea Risk or Actual (Comorbidity Management)  Goal: Unobstructed Breathing During Sleep  Outcome: Ongoing, Progressing   Goal Outcome Evaluation:  Plan of Care Reviewed With: patient        Progress: improving  Outcome Summary: VSS, c.o pain (HA) (see MAR). No signs of distress. Uses O2 2L when asleep, RA while awake. BMx1. Up ad bob to BSC. IS self-administer. SR on the monitor. A&Ox4. Continues on antibiotics. Remains afebrile. Monitoring BG and dosing accordingly

## 2021-06-17 NOTE — PROGRESS NOTES
LOS: 3 days     Chief Complaint:  Follow-up Strep septicemia    Interval History:  No fever. Tolerating cefepime. No rashes. Still w/ a cough.     ROS: no n/v/d    Vital Signs  Temp:  [97.8 °F (36.6 °C)-98.4 °F (36.9 °C)] 97.8 °F (36.6 °C)  Heart Rate:  [] 83  Resp:  [16-18] 18  BP: (132-148)/(60-72) 132/71    Physical Exam:  General: awake, alert, NAD, very nice  Eyes: no scleral icterus  ENT:  dentures  Cardiovascular: NR, RR, no murmur  Respiratory: mild BLL rales; normal work of breathing on 2L NC  GI: Abdomen is non-distended  Skin: No rashes  Neurological: Alert and oriented x 3  Psychiatric: Normal mood and affect     Antibiotics:  •  Cefepime 2 g IV q8h    LABS:  CBC, BMP, micro reviewed today  Lab Results   Component Value Date    WBC 8.73 06/17/2021    HGB 8.7 (L) 06/17/2021    HCT 27.4 (L) 06/17/2021    MCV 89.3 06/17/2021     06/17/2021     Lab Results   Component Value Date    GLUCOSE 134 (H) 06/17/2021    BUN 17 06/17/2021    CREATININE 0.69 06/17/2021    EGFRIFNONA 85 06/17/2021    BCR 24.6 06/17/2021    CO2 29.8 (H) 06/17/2021    CALCIUM 9.2 06/17/2021    ALBUMIN 3.70 06/17/2021    AST 21 06/17/2021    ALT 16 06/17/2021     Microbiology:  6/14 RPP: + coronavirus NL63  6/14 BCx: Strep gallolyticus ssp pasteurianus 2/2 sets (sensitive to peniciillin, levofloxacin, and ceftriaxone)  6/14 SpCx: moderate growth Pseudomonas (sensitive to levofloxacin, cipro, Zosyn, cefepime)  6/16 BCx: NGTD    Radiology (prior):  TTE with good view of the bioprosthetic MV and it was normal    CTA chest negative for PE; + atypical infection    EKG ordered and personally reviewed with QTc 402 ms    Assessment/Plan   1. COPD exacerbation due to coronavirus NL63 and Pseudomonas aeruginosa  2. Strep gallolyticus septicemia  3. Chronic hypoxic respiratory failure (3L NC at home)  4. Mitral valve replaced (8/2014)  5. Tricuspid valve repair (8/2014)    For Strep septicemia, she cleared her blood cultures quickly and  is on targeted antibiotic therapy. Her TTE showed good views of the bioprosthetic MV and there was no vegetation. I am going to recommend 10 days of antibiotic therapy (see below) with stop date 6/24/21 and repeat blood cultures about one week after finishing the antibiotic course. She had a colonoscopy last month that didn't show any lesions per my review of the procedure note.    For Pseudomonas pneumonia, continue cefepime 2 g IV q8h for now but will plan to change to levofloxacin 750 mg PO q24h at time of discharge with stop date 6/24/21 as listed above. Repeat EKG had QTc of 402 ms which is acceptable.     For non-COVID coronavirus infection, continue supportive care.       ID will follow. D/W Dr EDY Prince.

## 2021-06-18 ENCOUNTER — READMISSION MANAGEMENT (OUTPATIENT)
Dept: CALL CENTER | Facility: HOSPITAL | Age: 66
End: 2021-06-18

## 2021-06-18 ENCOUNTER — TELEPHONE (OUTPATIENT)
Dept: FAMILY MEDICINE CLINIC | Facility: CLINIC | Age: 66
End: 2021-06-18

## 2021-06-18 VITALS
OXYGEN SATURATION: 95 % | HEART RATE: 85 BPM | WEIGHT: 139.1 LBS | SYSTOLIC BLOOD PRESSURE: 150 MMHG | HEIGHT: 67 IN | BODY MASS INDEX: 21.83 KG/M2 | TEMPERATURE: 97.8 F | RESPIRATION RATE: 20 BRPM | DIASTOLIC BLOOD PRESSURE: 76 MMHG

## 2021-06-18 DIAGNOSIS — R78.81 STREPTOCOCCAL BACTEREMIA: Primary | ICD-10-CM

## 2021-06-18 DIAGNOSIS — Z79.891 LONG TERM CURRENT USE OF OPIATE ANALGESIC: ICD-10-CM

## 2021-06-18 DIAGNOSIS — B95.5 STREPTOCOCCAL BACTEREMIA: Primary | ICD-10-CM

## 2021-06-18 LAB
ALBUMIN SERPL-MCNC: 3.9 G/DL (ref 3.5–5.2)
ANION GAP SERPL CALCULATED.3IONS-SCNC: 10.2 MMOL/L (ref 5–15)
BASOPHILS # BLD AUTO: 0.02 10*3/MM3 (ref 0–0.2)
BASOPHILS NFR BLD AUTO: 0.2 % (ref 0–1.5)
BUN SERPL-MCNC: 18 MG/DL (ref 8–23)
BUN/CREAT SERPL: 24.7 (ref 7–25)
CALCIUM SPEC-SCNC: 9.1 MG/DL (ref 8.6–10.5)
CHLORIDE SERPL-SCNC: 103 MMOL/L (ref 98–107)
CO2 SERPL-SCNC: 26.8 MMOL/L (ref 22–29)
CREAT SERPL-MCNC: 0.73 MG/DL (ref 0.57–1)
DEPRECATED RDW RBC AUTO: 43.4 FL (ref 37–54)
EOSINOPHIL # BLD AUTO: 0 10*3/MM3 (ref 0–0.4)
EOSINOPHIL NFR BLD AUTO: 0 % (ref 0.3–6.2)
ERYTHROCYTE [DISTWIDTH] IN BLOOD BY AUTOMATED COUNT: 13.9 % (ref 12.3–15.4)
GFR SERPL CREATININE-BSD FRML MDRD: 79 ML/MIN/1.73
GLUCOSE BLDC GLUCOMTR-MCNC: 102 MG/DL (ref 70–130)
GLUCOSE BLDC GLUCOMTR-MCNC: 114 MG/DL (ref 70–130)
GLUCOSE BLDC GLUCOMTR-MCNC: 121 MG/DL (ref 70–130)
GLUCOSE SERPL-MCNC: 105 MG/DL (ref 65–99)
HCT VFR BLD AUTO: 27.5 % (ref 34–46.6)
HGB BLD-MCNC: 9 G/DL (ref 12–15.9)
IMM GRANULOCYTES # BLD AUTO: 0.4 10*3/MM3 (ref 0–0.05)
IMM GRANULOCYTES NFR BLD AUTO: 4.1 % (ref 0–0.5)
LYMPHOCYTES # BLD AUTO: 1.04 10*3/MM3 (ref 0.7–3.1)
LYMPHOCYTES NFR BLD AUTO: 10.6 % (ref 19.6–45.3)
MCH RBC QN AUTO: 28.8 PG (ref 26.6–33)
MCHC RBC AUTO-ENTMCNC: 32.7 G/DL (ref 31.5–35.7)
MCV RBC AUTO: 87.9 FL (ref 79–97)
MONOCYTES # BLD AUTO: 0.51 10*3/MM3 (ref 0.1–0.9)
MONOCYTES NFR BLD AUTO: 5.2 % (ref 5–12)
NEUTROPHILS NFR BLD AUTO: 7.83 10*3/MM3 (ref 1.7–7)
NEUTROPHILS NFR BLD AUTO: 79.9 % (ref 42.7–76)
NRBC BLD AUTO-RTO: 0.2 /100 WBC (ref 0–0.2)
PHOSPHATE SERPL-MCNC: 3.4 MG/DL (ref 2.5–4.5)
PLATELET # BLD AUTO: 259 10*3/MM3 (ref 140–450)
PMV BLD AUTO: 11.1 FL (ref 6–12)
POTASSIUM SERPL-SCNC: 4.1 MMOL/L (ref 3.5–5.2)
RBC # BLD AUTO: 3.13 10*6/MM3 (ref 3.77–5.28)
SODIUM SERPL-SCNC: 140 MMOL/L (ref 136–145)
WBC # BLD AUTO: 9.8 10*3/MM3 (ref 3.4–10.8)

## 2021-06-18 PROCEDURE — 85025 COMPLETE CBC W/AUTO DIFF WBC: CPT | Performed by: INTERNAL MEDICINE

## 2021-06-18 PROCEDURE — 94799 UNLISTED PULMONARY SVC/PX: CPT

## 2021-06-18 PROCEDURE — 80069 RENAL FUNCTION PANEL: CPT | Performed by: INTERNAL MEDICINE

## 2021-06-18 PROCEDURE — 25010000002 HEPARIN (PORCINE) PER 1000 UNITS: Performed by: INTERNAL MEDICINE

## 2021-06-18 PROCEDURE — 63710000001 PREDNISONE PER 1 MG: Performed by: INTERNAL MEDICINE

## 2021-06-18 PROCEDURE — 25010000002 CEFEPIME PER 500 MG: Performed by: INTERNAL MEDICINE

## 2021-06-18 PROCEDURE — 82962 GLUCOSE BLOOD TEST: CPT

## 2021-06-18 PROCEDURE — 99232 SBSQ HOSP IP/OBS MODERATE 35: CPT | Performed by: INTERNAL MEDICINE

## 2021-06-18 RX ORDER — PREDNISONE 10 MG/1
TABLET ORAL
Qty: 31 TABLET | Refills: 0 | Status: SHIPPED | OUTPATIENT
Start: 2021-06-18 | End: 2021-07-07 | Stop reason: HOSPADM

## 2021-06-18 RX ORDER — HYDROCODONE BITARTRATE AND ACETAMINOPHEN 5; 325 MG/1; MG/1
1 TABLET ORAL EVERY 8 HOURS PRN
Qty: 90 TABLET | Refills: 0 | Status: SHIPPED | OUTPATIENT
Start: 2021-06-18 | End: 2021-07-15 | Stop reason: SDUPTHER

## 2021-06-18 RX ORDER — LEVOFLOXACIN 750 MG/1
750 TABLET ORAL EVERY 24 HOURS
Qty: 6 TABLET | Refills: 0 | Status: SHIPPED | OUTPATIENT
Start: 2021-06-19 | End: 2021-06-25

## 2021-06-18 RX ORDER — VALACYCLOVIR HYDROCHLORIDE 1 G/1
2000 TABLET, FILM COATED ORAL EVERY 12 HOURS SCHEDULED
Qty: 2 TABLET | Refills: 0 | Status: SHIPPED | OUTPATIENT
Start: 2021-06-18 | End: 2021-06-19

## 2021-06-18 RX ORDER — HYDROCODONE BITARTRATE AND ACETAMINOPHEN 5; 325 MG/1; MG/1
1 TABLET ORAL EVERY 8 HOURS PRN
Qty: 90 TABLET | Refills: 0 | Status: CANCELLED | OUTPATIENT
Start: 2021-06-18

## 2021-06-18 RX ORDER — LEVOFLOXACIN 750 MG/1
750 TABLET ORAL EVERY 24 HOURS
Status: DISCONTINUED | OUTPATIENT
Start: 2021-06-18 | End: 2021-06-18 | Stop reason: HOSPADM

## 2021-06-18 RX ORDER — VALACYCLOVIR HYDROCHLORIDE 500 MG/1
2000 TABLET, FILM COATED ORAL EVERY 12 HOURS SCHEDULED
Status: DISCONTINUED | OUTPATIENT
Start: 2021-06-18 | End: 2021-06-18 | Stop reason: HOSPADM

## 2021-06-18 RX ADMIN — LEVOFLOXACIN 750 MG: 750 TABLET, FILM COATED ORAL at 14:16

## 2021-06-18 RX ADMIN — PREDNISONE 20 MG: 20 TABLET ORAL at 08:34

## 2021-06-18 RX ADMIN — HYDROCODONE BITARTRATE AND ACETAMINOPHEN 1 TABLET: 5; 325 TABLET ORAL at 06:15

## 2021-06-18 RX ADMIN — ASPIRIN 81 MG: 81 TABLET, FILM COATED ORAL at 08:34

## 2021-06-18 RX ADMIN — CARVEDILOL 6.25 MG: 6.25 TABLET, FILM COATED ORAL at 08:34

## 2021-06-18 RX ADMIN — CEFEPIME HYDROCHLORIDE 2 G: 2 INJECTION, POWDER, FOR SOLUTION INTRAVENOUS at 06:15

## 2021-06-18 RX ADMIN — ATORVASTATIN CALCIUM 40 MG: 20 TABLET, FILM COATED ORAL at 08:34

## 2021-06-18 RX ADMIN — HYDROCODONE BITARTRATE AND ACETAMINOPHEN 1 TABLET: 5; 325 TABLET ORAL at 14:19

## 2021-06-18 RX ADMIN — IPRATROPIUM BROMIDE AND ALBUTEROL SULFATE 3 ML: 2.5; .5 SOLUTION RESPIRATORY (INHALATION) at 07:31

## 2021-06-18 RX ADMIN — AMLODIPINE BESYLATE 10 MG: 5 TABLET ORAL at 08:34

## 2021-06-18 RX ADMIN — PANTOPRAZOLE SODIUM 40 MG: 40 TABLET, DELAYED RELEASE ORAL at 06:15

## 2021-06-18 RX ADMIN — MAGNESIUM OXIDE 400 MG (241.3 MG MAGNESIUM) TABLET 400 MG: TABLET at 08:34

## 2021-06-18 RX ADMIN — ROFLUMILAST 500 MCG: 500 TABLET ORAL at 08:33

## 2021-06-18 RX ADMIN — CETIRIZINE HYDROCHLORIDE ALLERGY 10 MG: 10 TABLET ORAL at 08:34

## 2021-06-18 RX ADMIN — IPRATROPIUM BROMIDE AND ALBUTEROL SULFATE 3 ML: 2.5; .5 SOLUTION RESPIRATORY (INHALATION) at 11:57

## 2021-06-18 RX ADMIN — HEPARIN SODIUM 5000 UNITS: 5000 INJECTION INTRAVENOUS; SUBCUTANEOUS at 08:33

## 2021-06-18 RX ADMIN — GUAIFENESIN 600 MG: 600 TABLET, EXTENDED RELEASE ORAL at 08:34

## 2021-06-18 RX ADMIN — VALACYCLOVIR 2000 MG: 500 TABLET, FILM COATED ORAL at 11:35

## 2021-06-18 NOTE — PROGRESS NOTES
Discharge Planning Assessment  [facility]     Patient Name: Paz Browne  MRN: [id number]  Today's Date: 6/18/2021    Admit Date: 6/14/2021    Discharge Needs Assessment    No documentation.       Discharge Plan     Row Name 06/18/21 1643       Plan    Plan  Home with family    Final Discharge Disposition Code  01 - home or self-care    Final Note  return home with family denies any discharge needs, denies the need for home health. Spouse will transport home. Caitie LEVY        Continued Care and Services - Admitted Since 6/14/2021     Home Medical Care     Service Provider Request Status Selected Services Address Phone Fax Patient Preferred    [facility]  Declined N/A [address] [address] [phone] --              Expected Discharge Date and Time     Expected Discharge Date Expected Discharge Time    Jun 18, 2021         Demographic Summary    No documentation.       Functional Status    No documentation.       Psychosocial    No documentation.       Abuse/Neglect    No documentation.       Legal    No documentation.       Substance Abuse    No documentation.       Patient Forms    No documentation.           Melissa Davis, RN

## 2021-06-18 NOTE — PLAN OF CARE
Problem: Adult Inpatient Plan of Care  Goal: Plan of Care Review  Outcome: Ongoing, Progressing  Flowsheets (Taken 6/18/2021 1544)  Plan of Care Reviewed With: patient  Outcome Summary: VSS, c/o HA (see MAR). No signs of distress. A&Ox4. Walking O2 performed. Up ad bob BSC. IS self-administer. SR on the monitor. PO antibiotics started. Valtrex started as well. Monitoring BG. Family updated.  Goal: Patient-Specific Goal (Individualized)  Outcome: Ongoing, Progressing  Goal: Absence of Hospital-Acquired Illness or Injury  Outcome: Ongoing, Progressing  Intervention: Identify and Manage Fall Risk  Recent Flowsheet Documentation  Taken 6/18/2021 1419 by Gera Swartz, RN  Safety Promotion/Fall Prevention:   activity supervised   assistive device/personal items within reach   clutter free environment maintained   nonskid shoes/slippers when out of bed   room organization consistent   safety round/check completed   lighting adjusted  Taken 6/18/2021 1203 by Gera Swartz, RN  Safety Promotion/Fall Prevention:   activity supervised   assistive device/personal items within reach   clutter free environment maintained   nonskid shoes/slippers when out of bed   safety round/check completed   room organization consistent   lighting adjusted  Taken 6/18/2021 1005 by Gera Swartz, RN  Safety Promotion/Fall Prevention:   activity supervised   assistive device/personal items within reach   clutter free environment maintained   safety round/check completed   room organization consistent   nonskid shoes/slippers when out of bed   lighting adjusted  Taken 6/18/2021 0837 by Gera Swartz, RN  Safety Promotion/Fall Prevention:   activity supervised   assistive device/personal items within reach   clutter free environment maintained   nonskid shoes/slippers when out of bed   room organization consistent   safety round/check completed  Intervention: Prevent Skin Injury  Recent Flowsheet  Documentation  Taken 6/18/2021 1419 by Gera Swartz RN  Body Position: position changed independently  Skin Protection:   adhesive use limited   transparent dressing maintained   tubing/devices free from skin contact  Taken 6/18/2021 1203 by Gera Swartz RN  Body Position: position changed independently  Taken 6/18/2021 1005 by Gera Swartz RN  Body Position: position changed independently  Taken 6/18/2021 0837 by Gera Swartz RN  Body Position: position changed independently  Skin Protection:   adhesive use limited   transparent dressing maintained   tubing/devices free from skin contact  Intervention: Prevent and Manage VTE (venous thromboembolism) Risk  Recent Flowsheet Documentation  Taken 6/18/2021 0837 by Gera Swartz RN  VTE Prevention/Management: (pt on heparin )   bilateral   dorsiflexion/plantar flexion performed  Intervention: Prevent Infection  Recent Flowsheet Documentation  Taken 6/18/2021 1419 by Gera Swartz RN  Infection Prevention:   visitors restricted/screened   single patient room provided   rest/sleep promoted  Taken 6/18/2021 1203 by Gera Swartz RN  Infection Prevention:   visitors restricted/screened   single patient room provided   rest/sleep promoted  Taken 6/18/2021 1005 by Gera Swartz RN  Infection Prevention:   visitors restricted/screened   single patient room provided   rest/sleep promoted  Taken 6/18/2021 0837 by Gera Swartz RN  Infection Prevention:   visitors restricted/screened   single patient room provided   rest/sleep promoted  Goal: Optimal Comfort and Wellbeing  Outcome: Ongoing, Progressing  Intervention: Provide Person-Centered Care  Recent Flowsheet Documentation  Taken 6/18/2021 1419 by Gera Swartz RN  Trust Relationship/Rapport: care explained  Taken 6/18/2021 0837 by Gera Swartz RN  Trust Relationship/Rapport: care explained  Goal: Readiness for Transition  of Care  Outcome: Ongoing, Progressing     Problem: Fall Injury Risk  Goal: Absence of Fall and Fall-Related Injury  Outcome: Ongoing, Progressing  Intervention: Identify and Manage Contributors to Fall Injury Risk  Recent Flowsheet Documentation  Taken 6/18/2021 1419 by Gera Swartz RN  Medication Review/Management: medications reviewed  Taken 6/18/2021 1203 by Gera Swarzt RN  Medication Review/Management: medications reviewed  Taken 6/18/2021 1005 by Gera Swartz RN  Medication Review/Management: medications reviewed  Taken 6/18/2021 0837 by Gera Swartz RN  Medication Review/Management: medications reviewed  Intervention: Promote Injury-Free Environment  Recent Flowsheet Documentation  Taken 6/18/2021 1419 by Gera Swartz RN  Safety Promotion/Fall Prevention:   activity supervised   assistive device/personal items within reach   clutter free environment maintained   nonskid shoes/slippers when out of bed   room organization consistent   safety round/check completed   lighting adjusted  Taken 6/18/2021 1203 by Gera Swartz RN  Safety Promotion/Fall Prevention:   activity supervised   assistive device/personal items within reach   clutter free environment maintained   nonskid shoes/slippers when out of bed   safety round/check completed   room organization consistent   lighting adjusted  Taken 6/18/2021 1005 by Grea Swartz RN  Safety Promotion/Fall Prevention:   activity supervised   assistive device/personal items within reach   clutter free environment maintained   safety round/check completed   room organization consistent   nonskid shoes/slippers when out of bed   lighting adjusted  Taken 6/18/2021 0837 by Gera Swartz RN  Safety Promotion/Fall Prevention:   activity supervised   assistive device/personal items within reach   clutter free environment maintained   nonskid shoes/slippers when out of bed   room organization  consistent   safety round/check completed     Problem: Adjustment to Illness COPD (Chronic Obstructive Pulmonary Disease)  Goal: Optimal Chronic Illness Coping  Outcome: Ongoing, Progressing  Intervention: Support and Optimize Psychosocial Response  Recent Flowsheet Documentation  Taken 6/18/2021 1419 by Gera Swartz RN  Supportive Measures:   active listening utilized   verbalization of feelings encouraged  Family/Support System Care:   support provided   self-care encouraged  Taken 6/18/2021 0837 by Gera Swartz RN  Supportive Measures:   active listening utilized   verbalization of feelings encouraged  Family/Support System Care:   support provided   self-care encouraged     Problem: Functional Ability Impaired COPD (Chronic Obstructive Pulmonary Disease)  Goal: Optimal Level of Functional [place]  Outcome: Ongoing, Progressing  Intervention: Optimize Functional Ability  Recent Flowsheet Documentation  Taken 6/18/2021 1419 by Gera Swartz RN  Activity Management:   up ad bob   activity encouraged   activity adjusted per tolerance  Environmental Support: calm environment promoted  Taken 6/18/2021 1203 by Gera Swartz RN  Activity Management:   up ad bob   activity adjusted per tolerance   activity encouraged  Taken 6/18/2021 1005 by Gera Swartz RN  Activity Management:   up ad bob   activity adjusted per tolerance  Taken 6/18/2021 0837 by Gera Swartz RN  Activity Management:   up ad bob   activity adjusted per tolerance  Environmental Support: calm environment promoted     Problem: Infection COPD (Chronic Obstructive Pulmonary Disease)  Goal: Absence of Infection Signs and Symptoms  Outcome: Ongoing, Progressing  Intervention: Prevent or Manage Infection  Recent Flowsheet Documentation  Taken 6/18/2021 1419 by Gera Swartz RN  Isolation Precautions: protective environment maintained  Taken 6/18/2021 1203 by Gera Swartz  RN  Isolation Precautions: protective environment maintained  Taken 6/18/2021 1005 by Gera Swartz RN  Isolation Precautions: protective environment maintained  Taken 6/18/2021 0837 by Gera Swartz RN  Isolation Precautions: protective environment maintained     Problem: Oral Intake Inadequate COPD (Chronic Obstructive Pulmonary Disease)  Goal: Improved Nutrition Intake  Outcome: Ongoing, Progressing     Problem: Respiratory Compromise COPD (Chronic Obstructive Pulmonary Disease)  Goal: Effective Oxygenation and Ventilation  Outcome: Ongoing, Progressing  Intervention: Promote Airway Secretion Clearance  Recent Flowsheet Documentation  Taken 6/18/2021 1419 by Gera Swartz RN  Activity Management:   up ad bob   activity encouraged   activity adjusted per tolerance  Cough And Deep Breathing: done independently per patient  Taken 6/18/2021 1203 by Gera Swartz RN  Activity Management:   up ad bob   activity adjusted per tolerance   activity encouraged  Taken 6/18/2021 1005 by Gera Swartz RN  Activity Management:   up ad bob   activity adjusted per tolerance  Taken 6/18/2021 0837 by Gera Swartz RN  Activity Management:   up ad bob   activity adjusted per tolerance  Cough And Deep Breathing: done independently per patient  Intervention: Optimize Oxygenation and Ventilation  Recent Flowsheet Documentation  Taken 6/18/2021 1419 by Gera Swartz RN  Fluid/Electrolyte Management: fluids provided  Head of Bed (HOB): HOB elevated  Airway/Ventilation Management: airway patency maintained  Taken 6/18/2021 1203 by Gera Swartz RN  Head of Bed (HOB): HOB elevated  Taken 6/18/2021 1005 by Gera Swartz RN  Head of Bed (HOB): HOB elevated  Taken 6/18/2021 0837 by Gera Swartz RN  Fluid/Electrolyte Management: fluids provided  Head of Bed (HOB): HOB elevated  Airway/Ventilation Management: airway patency maintained     Problem:  Electrolyte Imbalance  Goal: Electrolyte Balance  Outcome: Ongoing, Progressing  Intervention: Monitor and Manage Electrolyte Imbalance  Recent Flowsheet Documentation  Taken 6/18/2021 1419 by Gera Swartz RN  Fluid/Electrolyte Management: fluids provided  Taken 6/18/2021 0837 by Gera Swartz RN  Fluid/Electrolyte Management: fluids provided     Problem: COPD Comorbidity  Goal: Maintenance of COPD Symptom Control  Outcome: Ongoing, Progressing  Intervention: Maintain COPD-Symptom Control  Recent Flowsheet Documentation  Taken 6/18/2021 1419 by Gera Swartz RN  Medication Review/Management: medications reviewed  Taken 6/18/2021 1203 by Gera Swartz RN  Medication Review/Management: medications reviewed  Taken 6/18/2021 1005 by Gera Swartz RN  Medication Review/Management: medications reviewed  Taken 6/18/2021 0837 by Gera Swartz RN  Medication Review/Management: medications reviewed     Problem: Heart Failure Comorbidity  Goal: Maintenance of Heart Failure Symptom Control  Outcome: Ongoing, Progressing  Intervention: Maintain Heart Failure-Management Strategies  Recent Flowsheet Documentation  Taken 6/18/2021 1419 by Gera Swartz RN  Medication Review/Management: medications reviewed  Taken 6/18/2021 1203 by Gera Swartz RN  Medication Review/Management: medications reviewed  Taken 6/18/2021 1005 by Gera Swartz RN  Medication Review/Management: medications reviewed  Taken 6/18/2021 0837 by Gera Swartz RN  Medication Review/Management: medications reviewed     Problem: Hypertension Comorbidity  Goal: Blood Pressure in Desired Range  Outcome: Ongoing, Progressing  Intervention: Maintain Hypertension-Management Strategies  Recent Flowsheet Documentation  Taken 6/18/2021 1419 by Gera Swartz RN  Medication Review/Management: medications reviewed  Taken 6/18/2021 1203 by Gera Swartz RN  Medication  Review/Management: medications reviewed  Taken 6/18/2021 1005 by Gera Swartz, RN  Medication Review/Management: medications reviewed  Taken 6/18/2021 0837 by Gera Swartz, RN  Medication Review/Management: medications reviewed     Problem: Obstructive Sleep Apnea Risk or Actual (Comorbidity Management)  Goal: Unobstructed Breathing During Sleep  Outcome: Ongoing, Progressing   Goal Outcome Evaluation:  Plan of Care Reviewed With: patient        Progress: improving  Outcome Summary: VSS, c/o HA (see MAR). No signs of distress. A&Ox4. Walking O2 performed. Up ad bob BSC. IS self-administer. SR on the monitor. PO antibiotics started. Valtrex started as well. Monitoring BG. Family updated.

## 2021-06-18 NOTE — PROGRESS NOTES
LOS: 4 days     Chief Complaint:  Follow-up Strep septicemia    Interval History:  No fever. On 1L NC. She says she has a fever blisters. Otherwise no new symptoms. Eager for discharge.     ROS: no n/v/d    Vital Signs  Temp:  [97.8 °F (36.6 °C)-98.6 °F (37 °C)] 97.8 °F (36.6 °C)  Heart Rate:  [78-89] 80  Resp:  [16-20] 18  BP: (137-150)/(66-76) 150/76    Physical Exam:  General: awake, alert, NAD, very nice  Eyes: no scleral icterus  ENT:  dentures  Cardiovascular: NR  Respiratory: normal work of breathing on 1L NC  GI: Abdomen is non-distended  Skin: No rashes  Neurological: Alert and oriented x 3  Psychiatric: Normal mood and affect     Antibiotics:  •  Cefepime 2 g IV q8h    LABS:  CBC, BMP, micro reviewed today  Lab Results   Component Value Date    WBC 9.80 06/18/2021    HGB 9.0 (L) 06/18/2021    HCT 27.5 (L) 06/18/2021    MCV 87.9 06/18/2021     06/18/2021     Lab Results   Component Value Date    GLUCOSE 105 (H) 06/18/2021    BUN 18 06/18/2021    CREATININE 0.73 06/18/2021    EGFRIFNONA 79 06/18/2021    BCR 24.7 06/18/2021    CO2 26.8 06/18/2021    CALCIUM 9.1 06/18/2021    ALBUMIN 3.90 06/18/2021    AST 21 06/17/2021    ALT 16 06/17/2021     Microbiology:  6/14 RPP: + coronavirus NL63  6/14 BCx: Strep gallolyticus ssp pasteurianus 2/2 sets (sensitive to peniciillin, levofloxacin, and ceftriaxone)  6/14 SpCx: moderate growth Pseudomonas (sensitive to levofloxacin, cipro, Zosyn, cefepime)  6/16 BCx: NGTD    Radiology (prior):  TTE with good view of the bioprosthetic MV and it was normal    CTA chest negative for PE; + atypical infection    EKG ordered and personally reviewed with QTc 402 ms    Assessment/Plan   1. COPD exacerbation due to coronavirus NL63 and Pseudomonas aeruginosa  2. Strep gallolyticus septicemia  3. Chronic hypoxic respiratory failure (3L NC at home)  4. Mitral valve replaced (8/2014)  5. Tricuspid valve repair (8/2014)    For Strep septicemia, she cleared her blood cultures  quickly, and TTE showed good views of the bioprosthetic MV and there was no vegetation. I recommend that we stop cefepime and start levofloxacin 750 mg PO q24h through 6/24/21 which will complete a 10-day total course of antibiotics. This will also treat the Pseudomonas in her sputum.     I asked her to return to the lab on 7/1 or 7/2 for blood cultures (one week after finishing levofloxacin) and she said she would do so.      For fever blister, start Valtrex 2 g BID x 2 doses.     D/w Jeaneth Boss.     Thank you for allowing me to be involved in the care of Mrs. Browne.  Infectious diseases will sign off at this time with antibiotics plan in place, but please call me at [phone] if any further ID questions or new ID concerns.

## 2021-06-18 NOTE — OUTREACH NOTE
Prep Survey      Responses   [facility] patient discharged from?  [place]   Is LACE score < 7 ?  No   Emergency Room discharge w/ pulse ox?  No   Eligibility  [facility]   Date of Admission  06/14/21   Date of Discharge  06/18/21   Discharge Disposition  Home or Self Care   Discharge diagnosis  COPD/ CHF, HTN   Does the patient have one of the following disease processes/diagnoses(primary or secondary)?  COPD/Pneumonia   Does the patient have Home health ordered?  No   Is there a DME ordered?  No   Prep survey completed?  Yes          Thalia Nicholson RN

## 2021-06-18 NOTE — TELEPHONE ENCOUNTER
PATIENT NEEDS REFILL ON::HYDROcodone-acetaminophen (NORCO) 5-325 MG per tablet     PATIENT CAN BE REACHED ON: [phone]    PHARMACY PREFERRED:[facility]/pharmacy #[id number] - [place], [place] - [address] - [phone]  - [phone]   [phone]

## 2021-06-18 NOTE — DISCHARGE PLACEMENT REQUEST
"Paz Moreau (66 y.o. Female)     Date of Birth Social Security Number Address Home Phone MRN    1955  [id number]   [place] [address] [phone] [id number]    [employer] Marital Status          Judaism        Admission Date Admission Type Admitting Provider Attending Provider Department, Room/Bed    6/14/21 Emergency Guanako Crenshaw MD Jain, Subin, MD [address], [address]    Discharge Date Discharge Disposition Discharge Destination         [facility] [place] [facility]              Attending Provider: Guanako Crenshaw MD    Allergies: Bupropion, Cephalexin, Metoprolol    Isolation: None   Infection: None   Code Status: CPR    Ht: 170.2 cm (67\")   Wt: 63.1 kg (139 lb 1.6 oz)    Admission Cmt: None   Principal Problem: None                Active Insurance as of 6/14/2021     Primary Coverage     Payor Plan Insurance Group Employer/Plan Group    ANTH MEDICARE REPLACEMENT ANTH MEDICARE ADVANTAGE [id number]     Payor Plan Address Payor Plan Phone Number Payor Plan Fax Number Effective Dates    [address] [phone]  1/1/2016 - None Entered    [facility] [address]       Subscriber Name Subscriber Birth Date Member ID       PAZ MOREAU 1955 [id number]                 Emergency Contacts      (Rel.) Home Phone Work Phone Mobile Phone    Chapincito Moreau (Spouse) [phone] -- [phone]    Cinda Moreau (Daughter) -- -- [phone]    Malissa Calabrese (Daughter) [phone] -- [phone]              "

## 2021-06-18 NOTE — DISCHARGE SUMMARY
PHYSICIAN DISCHARGE SUMMARY                                                                          [facility]    Patient Identification:  Name: Paz Browne  Age: 66 y.o.  Sex: female  :  1955  MRN: [id number]  Primary Care Physician: Javan Martinez MD    Admit date: 2021  Discharge date:2021  Discharged Condition: good    Discharge Diagnoses:    COPD exacerbation (CMS/HCC)   COPD exacerbation - Mild  Strepto norman bacteremia  Coronavirus (Non-Covid-19) infection  Chronic hypoxia  Acute hypokalemia improved  Uncontrolled hypertension  Acute diastolic CHF  PHT - with normal PCWP ()  Anemia  Hx MV replacement, tissue valve  Afib paroxysmal, not on AC  Hx severe GIB  Pseudomonas LRTI    Hospital Course:   Patient is a 66-year-old with COPD and chronic hypoxic respiratory failure who presented to the emergency room with worsening shortness of breath.  She was found to have non-Covid seasonal coronavirus as well as grew Pseudomonas in her sputum suggestive of a a lower respiratory tract infection.  She was also found to have strep bacteremia.  She was seen and evaluated by infectious disease and given her history of cardiac valves seen by cardiology.  Echocardiogram that per cardiology showed good visualization of her valve without any vegetations seen.  She quickly cleared her blood cultures.  She was transitioned over to Levaquin that will cover both.  She will follow-up for repeat blood cultures as an outpatient with our infectious disease consultants Dr. Clayton.  She will follow-up with her primary care physician.  She felt much better after being treated with IV methylprednisolone transition over to prednisone.  She was to be discharged on her day of discharge and was very confident she better take care of herself upon discharge.  She will follow-up with Dr. Anatoliy Melo in our group    Consults:   IP CONSULT TO  PULMONOLOGY  IP CONSULT TO CARDIOLOGY  IP CONSULT TO INFECTION CONTROL NURSE  IP CONSULT TO INFECTIOUS DISEASES    Consults:   IP CONSULT TO PULMONOLOGY  IP CONSULT TO CARDIOLOGY  IP CONSULT TO INFECTION CONTROL NURSE  IP CONSULT TO INFECTIOUS DISEASES    Significant Discharge Diagnostics   Procedures Performed:         Pertinent Lab Results:  Results from last 7 days   Lab Units 06/18/21  0615 06/17/21  0657 06/15/21  0816 06/14/21  1723   SODIUM mmol/L 140 140 138 139   POTASSIUM mmol/L 4.1 3.8 4.4 2.8*   CHLORIDE mmol/L 103 100 98 96*   CO2 mmol/L 26.8 29.8* 31.0* 31.4*   BUN mg/dL 18 17 14 11   CREATININE mg/dL 0.73 0.69 0.77 0.83   GLUCOSE mg/dL 105* 134* 115* 127*   CALCIUM mg/dL 9.1 9.2 9.0 9.1   AST (SGOT) U/L  --  21  --  21   ALT (SGPT) U/L  --  16  --  14     Results from last 7 days   Lab Units 06/14/21  1723   TROPONIN T ng/mL 0.019     Results from last 7 days   Lab Units 06/18/21  0615 06/17/21  0657 06/15/21  0817 06/15/21  0817 06/14/21  1723   WBC 10*3/mm3 9.80 8.73  --  8.50 7.76   HEMOGLOBIN g/dL 9.0* 8.7*  --  9.1* 9.8*   HEMATOCRIT % 27.5* 27.4*  --  27.2* 29.7*   PLATELETS 10*3/mm3 259 234  --  171 179   MCV fL 87.9 89.3  --  86.6 87.9   MCH pg 28.8 28.3  --  29.0 29.0   MCHC g/dL 32.7 31.8  --  33.5 33.0   RDW % 13.9 13.6  --  13.4 13.7   RDW-SD fl 43.4 44.2  --  41.7 43.7   MPV fL 11.1 11.0  --  11.1 11.0   NEUTROPHIL % % 79.9* 85.4*  --   --  92.9*   LYMPHOCYTE % % 10.6* 9.2*  --   --  4.0*   MONOCYTES % % 5.2 4.5*  --   --  2.4*   EOSINOPHIL % % 0.0* 0.0*  --   --  0.1*   BASOPHIL % % 0.2 0.1  --   --  0.1   IMM GRAN % % 4.1* 0.8*   < >  --   --    NEUTROS ABS 10*3/mm3 7.83* 7.46*  --   --  7.20*   LYMPHS ABS 10*3/mm3 1.04 0.80  --   --  0.31*   MONOS ABS 10*3/mm3 0.51 0.39  --   --  0.19   EOS ABS 10*3/mm3 0.00 0.00  --   --  0.01   BASOS ABS 10*3/mm3 0.02 0.01  --   --  0.01   IMMATURE GRANS (ABS) 10*3/mm3 0.40* 0.07*   < >  --   --    NRBC /100 WBC 0.2 0.0   < >  --   --     < > =  values in this interval not displayed.     Results from last 7 days   Lab Units 06/14/21  1723   INR  1.09     Results from last 7 days   Lab Units 06/15/21  0816 06/14/21  1723   MAGNESIUM mg/dL 2.6* 1.5*           Invalid input(s): LDLCALC  Results from last 7 days   Lab Units 06/14/21  1723   PROBNP pg/mL 3,638.0*         Results from last 7 days   Lab Units 06/15/21  0417   PH, ARTERIAL pH units 7.389   PO2 ART mm Hg 102.4*   PCO2, ARTERIAL mm Hg 54.1*   HCO3 ART mmol/L 32.7*     Results from last 7 days   Lab Units 06/15/21  0816 06/14/21  1723   PROCALCITONIN ng/mL 0.39* 0.32*   LACTATE mmol/L  --  1.2             Results from last 7 days   Lab Units 06/16/21  0629 06/16/21  0625 06/14/21  1727 06/14/21  1723   BLOODCX  No growth at 2 days No growth at 2 days  --  Streptococcus gallolyticus ssp pasteurianus*  Streptococcus gallolyticus ssp pasteurianus*   RESPCX   --   --  Moderate growth (3+) Pseudomonas aeruginosa*  Heavy growth (4+) Normal Respiratory Lesly  --    BCIDPCR   --   --   --  Streptococcus spp, not A, B, or pneumoniae. Identification by BCID PCR.*     Results from last 7 days   Lab Units 06/15/21  0153   NITRITE UA  Negative   WBC UA /HPF 0-2   BACTERIA UA /HPF None Seen   SQUAM EPITHEL UA /HPF 0-2     Results from last 7 days   Lab Units 06/14/21  1706   ADENOVIRUS DETECTION BY PCR  Not Detected   CORONAVIRUS 229E  Not Detected   CORONAVIRUS HKU1  Not Detected   CORONAVIRUS NL63  Detected*   CORONAVIRUS OC43  Not Detected   HUMAN METAPNEUMOVIRUS  Not Detected   HUMAN RHINOVIRUS/ENTEROVIRUS  Not Detected   INFLUENZA B PCR  Not Detected   PARAINFLUENZA 1  Not Detected   PARAINFLUENZA VIRUS 2  Not Detected   PARAINFLUENZA VIRUS 3  Not Detected   PARAINFLUENZA VIRUS 4  Not Detected   BORDETELLA PERTUSSIS PCR  Not Detected   CHLAMYDOPHILA PNEUMONIAE PCR  Not Detected   MYCOPLAMA PNEUMO PCR  Not Detected   INFLUENZA A PCR  Not Detected   RSV, PCR  Not Detected           Imaging Results:  Imaging  Results (All)     Procedure Component Value Units Date/Time    CT Angiogram Chest With & Without Contrast [[id number]] Collected: 06/15/21 1244     Updated: 06/16/21 1041    Narrative:      CT ANGIOGRAM CHEST WITH CONTRAST, PULMONARY EMBOLISM PROTOCOL     HISTORY: Positive D-dimer, back pain, shortness of breath.     TECHNIQUE: Spiral CT images were obtained from the lung apices to the  diaphragmatic domes following administration of intravenous contrast in  the angiographic phase. Coronal, sagittal and 3-D volume rendered  reformats were then obtained.     COMPARISON: CT chest without contrast from 07/22/2020.     FINDINGS: The pulmonary arteries are well opacified with intravenous  contrast.There are no convincing filling defects to suggest pulmonary  artery thromboembolism. Moderate calcified atherosclerotic plaque is  seen throughout the thoracic aorta. The patient is status post median  sternotomy and mitral valve repair/prosthesis. No pleural or pericardial  effusion is seen. There is no pathological mediastinal lymphadenopathy  present.     No pathological axillary lymphadenopathy. The visualized upper abdomen  demonstrates changes from prior cholecystectomy. Dilated common bile  duct may represent benign biliary ectasia. Correlation with alkaline  phosphatase is recommended. The spleen appears bulky. There is reflux of  contrast within the hepatic veins most suggestive of right heart  dysfunction. There is a large posterolateral tracheal diverticulum  present at the thoracic inlet. The central airways are otherwise patent.  There is marked emphysematous change identified within the lung fields.  There is stable apical pleural-parenchymal scarring on the right. The  lung fields diffusely demonstrate groundglass density. In addition there  is an area of irregular airspace density seen within the left lower  lobe, images 72 through 76.     There is a small sliding hiatal hernia present.       Impression:      1.  "No convincing evidence of pulmonary artery thromboembolism.  2. Marked emphysematous change. Diffuse ground glass density within the  lung fields along with an irregular area of airspace opacity within the  left lower lobe is most suggestive of an atypical infection.  3. Additional findings as above.     Radiation dose reduction techniques were utilized, including automated  exposure control and exposure modulation based on body size.     This report was finalized on 6/16/2021 10:38 AM by Dr. Nellie Landaverde M.D.       XR Chest 1 View [[id number]] Collected: 06/14/21 1808     Updated: 06/14/21 1812    Narrative:      PORTABLE CHEST X-RAY     HISTORY: Shortness of breath, possible COVID 19 pneumonia.     TECHNIQUE: Portable chest x-ray is correlated with chest x-ray May 20,  2021.     FINDINGS: There are sternal wires with mild cardiomegaly and extensive  emphysematous pulmonary parenchymal change. No infiltrate, edema, or  effusion is present.       Impression:      Extensive chronic change in the chest. No acute abnormality.  Specifically, there is no x-ray evidence of COVID 19 pneumonia.     This report was finalized on 6/14/2021 6:09 PM by Dr. Akbar Kumar M.D.             Discharge Exam:  /76 (BP Location: Right arm, Patient Position: Lying)   Pulse 85   Temp 97.8 °F (36.6 °C) (Oral)   Resp 20   Ht 170.2 cm (67\")   Wt 63.1 kg (139 lb 1.6 oz)   SpO2 95%   BMI 21.79 kg/m²   General appearance: Nontoxic, conversant   Eyes: anicteric sclerae, moist conjunctivae; no lid-lag; PERRLA  HENT: Atraumatic; oropharynx clear with moist mucous membranes and no mucosal ulcerations; normal hard and soft palate  Neck: Trachea midline; FROM, supple,   Lungs: No  wheezing no rhonchi, with normal respiratory effort and no intercostal retractions  CV: RRR, no MRGs   Abdomen: Soft, non-tender;   Extremities: No peripheral edema or extremity lymphadenopathy  Skin: Warm well perfused no diffuse rash  Psych: Appropriate " affect, alert and oriented to person, place and time  Neuro moves all ext, cns 2-12 intact sensation intact     Discharge Disposition  Home-Health Care c    Discharge Medications     Discharge Medications      New Medications      Instructions Start Date   levoFLOXacin 750 MG tablet  Commonly known as: LEVAQUIN   750 mg, Oral, Every 24 Hours   Start Date: June 19, 2021     predniSONE 10 MG tablet  Commonly known as: DELTASONE   Take 4 tabs daily x 3 days, then take 3 tabs daily x 3 days, then take 2 tabs daily x 3 days, then take 1 tab daily x 3 days         Changes to Medications      Instructions Start Date   polyethylene glycol packet  Commonly known as: MIRALAX  What changed:   · when to take this  · reasons to take this   17 g, Oral, 2 Times Daily      valACYclovir 1000 MG tablet  Commonly known as: Valtrex  What changed: Another medication with the same name was added. Make sure you understand how and when to take each.   1 p.o. 3 times daily x1 week      valACYclovir 1000 MG tablet  Commonly known as: VALTREX  What changed: You were already taking a medication with the same name, and this prescription was added. Make sure you understand how and when to take each.   2,000 mg, Oral, Every 12 Hours Scheduled         Continue These Medications      Instructions Start Date   albuterol sulfate  (90 Base) MCG/ACT inhaler  Commonly known as: PROVENTIL HFA;VENTOLIN HFA;PROAIR HFA   2 puffs, Inhalation, Every 4 Hours PRN      albuterol (2.5 MG/3ML) 0.083% nebulizer solution  Commonly known as: PROVENTIL   INHALE 1 VIAL BY MOUTH EVERY 4 HOURS AS NEEDED FOR WHEEZING      amLODIPine 10 MG tablet  Commonly known as: NORVASC   TAKE 1 TABLET BY MOUTH EVERY DAY      aspirin 81 MG EC tablet   81 mg, Oral, Daily      atorvastatin 40 MG tablet  Commonly known as: LIPITOR   TAKE 1 TABLET BY MOUTH EVERY DAY      benzonatate 100 MG capsule  Commonly known as: TESSALON   TAKE 1 CAPSULE BY MOUTH 2 TIMES A DAY AS NEEDED FOR  COUGH      carvedilol 6.25 MG tablet  Commonly known as: COREG   TAKE 1 TABLET BY MOUTH TWICE A DAY WITH MEALS      Claritin 10 MG tablet  Generic drug: loratadine   10 mg, Oral, 2 times daily      cyclobenzaprine 10 MG tablet  Commonly known as: FLEXERIL   TAKE 1 TABLET BY MOUTH AT BEDTIME      fish oil 1000 MG capsule capsule   Oral, Nightly      fluconazole 150 MG tablet  Commonly known as: Diflucan   1 p.o. as needed yeast day 1, 4, 7      fluticasone-salmeterol 250-50 MCG/DOSE DISKUS  Commonly known as: Advair Diskus   1 puff, Inhalation, 2 Times Daily - RT      furosemide 40 MG tablet  Commonly known as: LASIX   40 mg, Oral, Daily      guaiFENesin 600 MG 12 hr tablet  Commonly known as: MUCINEX   600 mg, Oral, 2 Times Daily      HYDROcodone-acetaminophen 5-325 MG per tablet  Commonly known as: NORCO   1 tablet, Oral, Every 8 Hours PRN, Chronic pain medicine for low back pain      magnesium oxide 400 MG tablet  Commonly known as: MAG-OX   TAKE 1 TABLET BY MOUTH EVERY DAY      meclizine 25 MG tablet  Commonly known as: ANTIVERT   25 mg, Oral, 3 Times Daily PRN, prn      Multivital tablet tablet  Generic drug: multivitamin with minerals   1 tablet, Oral, Daily      Nebulizer device   1 Units, Does not apply, 4 Times Daily PRN, J44.1 j20.8      O2  Commonly known as: OXYGEN   3 L/min, Inhalation, Continuous      omeprazole 40 MG capsule  Commonly known as: priLOSEC   TAKE 1 CAPSULE BY MOUTH EVERY DAY      ondansetron 4 MG tablet  Commonly known as: ZOFRAN   4 mg, Oral, Every 12 Hours PRN      potassium chloride 10 MEQ CR tablet   1 p.o. daily      roflumilast 500 MCG tablet tablet  Commonly known as: DALIRESP   500 mcg, Oral, Daily      rOPINIRole 2 MG tablet  Commonly known as: REQUIP   TAKE 1 TABLET BY MOUTH EVERY DAY EVERY NIGHT      sertraline 100 MG tablet  Commonly known as: ZOLOFT   TAKE 1 TABLET BY MOUTH EVERY DAY      tiotropium 18 MCG per inhalation capsule  Commonly known as: Spiriva HandiHaler   1  capsule, Inhalation, Daily - RT      zaleplon 10 MG capsule  Commonly known as: SONATA   TAKE 1 CAPSULE BY MOUTH EVERY NIGHT             Discharge Diet: cardiac diet    Activity at Discharge: as tolerated    Follow-up Information     Javan Martinez MD .    Specialty: Internal Medicine  Contact information:  [facility] [place] [address]  [phone]                   Total time spent discharging patient including evaluation, medication reconciliation, arranging follow up, and post hospitalization instructions and education total time exceeds 30 minutes.    Signed:  Bubba Prince MD  6/18/2021  15:48 EDT

## 2021-06-18 NOTE — TELEPHONE ENCOUNTER
Caller: Paz Browne    Relationship: Self    Best call back number:[phone] (M)  Medication needed:   Requested Prescriptions     Pending Prescriptions Disp Refills   • HYDROcodone-acetaminophen (NORCO) 5-325 MG per tablet 90 tablet 0     Sig: Take 1 tablet by mouth Every 8 (Eight) Hours As Needed for Moderate Pain . Chronic pain medicine for low back pain       When do you need the refill by: AS SOON AS POSSIBLE  What additional details did the patient provide when requesting the medication: PATIENT HAS 4 DAYS LEFT     Does the patient have less than a 3 day supply:  [] Yes  [x] No    What is the patient's preferred pharmacy:    [facility]/pharmacy #[id number] - [place], [place] - [address] [place] [address] - [phone] [facility] [phone]   [phone]

## 2021-06-18 NOTE — NURSING NOTE
Walking O2 performed. Pt ambulated around NS, O2 desat to 86% on RA. Pt was administered 2L O2 NC with sats 92-95%. Pt will need O2 2L with activity at discharge.

## 2021-06-21 ENCOUNTER — TRANSITIONAL CARE MANAGEMENT TELEPHONE ENCOUNTER (OUTPATIENT)
Dept: CALL CENTER | Facility: HOSPITAL | Age: 66
End: 2021-06-21

## 2021-06-21 LAB
BACTERIA SPEC AEROBE CULT: NORMAL
BACTERIA SPEC AEROBE CULT: NORMAL

## 2021-06-21 NOTE — OUTREACH NOTE
Call Center TCM Note      Responses   [facility] patient discharged from?  [place]   Does the patient have one of the following disease processes/diagnoses(primary or secondary)?  COPD/Pneumonia   Was the primary reason for admission:  COPD exacerbation   TCM attempt successful?  No   Unsuccessful attempts  Attempt 2          Chelo Multani MA    6/21/2021, 17:53 EDT

## 2021-06-21 NOTE — OUTREACH NOTE
Call Center TCM Note      Responses   [facility] patient discharged from?  [place]   Does the patient have one of the following disease processes/diagnoses(primary or secondary)?  COPD/Pneumonia   Was the primary reason for admission:  COPD exacerbation   TCM attempt successful?  No   Unsuccessful attempts  Attempt 1          Chelo Multani MA    6/21/2021, 15:20 EDT

## 2021-06-22 ENCOUNTER — TRANSITIONAL CARE MANAGEMENT TELEPHONE ENCOUNTER (OUTPATIENT)
Dept: CALL CENTER | Facility: HOSPITAL | Age: 66
End: 2021-06-22

## 2021-06-22 NOTE — OUTREACH NOTE
Call Center TCM Note      Responses   [facility] patient discharged from?  [place]   Does the patient have one of the following disease processes/diagnoses(primary or secondary)?  COPD/Pneumonia   Was the primary reason for admission:  COPD exacerbation   TCM attempt successful?  Yes   Discharge diagnosis  COPD/ CHF, HTN   Meds reviewed with patient/caregiver?  Yes   Is the patient having any side effects they believe may be caused by any medication additions or changes?  No   Does the patient have all medications ordered at discharge?  Yes   Is the patient taking all medications as directed (includes completed medication regime)?  Yes   Does the patient have a primary care provider?   Yes   Does the patient have an appointment with their PCP or specialist within 7 days of discharge?  No   Comments regarding PCP  PCP Dr Martinez has no appts opening within TCM time frame. [facility] to review sched and call pt for TCM FWP to be completed by 07/02/2021   Has the patient kept scheduled appointments due by today?  Yes   Has home health visited the patient within 72 hours of discharge?  N/A   Psychosocial issues?  No   Did the patient receive a copy of their discharge instructions?  Yes   Nursing interventions  Reviewed instructions with patient   What is the patient's perception of their health status since discharge?  Improving   Nursing interventions  Educated on importance of maintaining up to date immunizations as advised by provider   If the patient is a current smoker, are they able to teach back resources for cessation?  Not a smoker   Is the patient/caregiver able to teach back the hierarchy of who to call/visit for symptoms/problems? PCP, Specialist, Home health nurse, Urgent Care, ED, 911  Yes   Is the patient able to teach back COPD zones?  Yes   Is the patient/caregiver able to teach back signs and symptoms of worsening condition:  Fever/chills, Shortness of breath, Chest pain   Is the  patient/caregiver able to teach back importance of completing antibiotic course of treatment?  Yes   TCM call completed?  Yes   Wrap up additional comments  Pt is very happy to say she is feeling so much better. Breathing is greatly improved and O2 levels much better. Pt is finsihing up antibiotic and prednisone over next 2 days. She is very grateful for her care. PCP Dr Martinez has no appts opening within TCM time frame. [facility] to review sched and call pt for TCM FWP to be completed by 07/02/2021          Chelo Multani MA    6/22/2021, 13:53 EDT

## 2021-06-25 ENCOUNTER — TELEPHONE (OUTPATIENT)
Dept: GASTROENTEROLOGY | Facility: CLINIC | Age: 66
End: 2021-06-25

## 2021-06-25 NOTE — TELEPHONE ENCOUNTER
Called pt and advised of Dr Devine's note. Verb understanding.     Results sent to Dr Delgadillo thru [facility].

## 2021-06-25 NOTE — TELEPHONE ENCOUNTER
----- Message from Cleveland Devine MD sent at 6/24/2021  8:50 PM EDT -----  06/24/21       Tell her that path from her EGD showed mild esophagitis, otherwise the other biopsies came back normal. FAX to her PCP.   Gagandeep whitley

## 2021-06-28 ENCOUNTER — READMISSION MANAGEMENT (OUTPATIENT)
Dept: CALL CENTER | Facility: HOSPITAL | Age: 66
End: 2021-06-28

## 2021-06-28 NOTE — OUTREACH NOTE
COPD/PN Week 2 Survey      Responses   [facility] patient discharged from?  [place]   Does the patient have one of the following disease processes/diagnoses(primary or secondary)?  COPD/Pneumonia   Was the primary reason for admission:  COPD exacerbation   Week 2 attempt successful?  No   Unsuccessful attempts  Attempt 1          Sabra Johnson RN

## 2021-06-30 ENCOUNTER — TELEPHONE (OUTPATIENT)
Dept: INFECTIOUS DISEASES | Facility: CLINIC | Age: 66
End: 2021-06-30

## 2021-06-30 NOTE — TELEPHONE ENCOUNTER
----- Message from Loy Clayton MD sent at 6/18/2021  9:43 AM EDT -----  Regarding: Call  Please call pt on 6/30 to reminder her to come to the lab either 7/1 or 7/2 for blood cultures. Thank you.

## 2021-06-30 NOTE — TELEPHONE ENCOUNTER
Phone: patient notified to come by [facility] out patient lab tomorrow for Blood cultures per Dr. Clayton. Patient states she will be here and please call her with results when ready. JOSI, RN

## 2021-07-01 ENCOUNTER — LAB (OUTPATIENT)
Dept: LAB | Facility: HOSPITAL | Age: 66
End: 2021-07-01

## 2021-07-01 ENCOUNTER — READMISSION MANAGEMENT (OUTPATIENT)
Dept: CALL CENTER | Facility: HOSPITAL | Age: 66
End: 2021-07-01

## 2021-07-01 DIAGNOSIS — R78.81 STREPTOCOCCAL BACTEREMIA: ICD-10-CM

## 2021-07-01 DIAGNOSIS — B95.5 STREPTOCOCCAL BACTEREMIA: ICD-10-CM

## 2021-07-01 PROCEDURE — 87186 SC STD MICRODIL/AGAR DIL: CPT

## 2021-07-01 PROCEDURE — 87077 CULTURE AEROBIC IDENTIFY: CPT

## 2021-07-01 PROCEDURE — 36415 COLL VENOUS BLD VENIPUNCTURE: CPT

## 2021-07-01 PROCEDURE — 87040 BLOOD CULTURE FOR BACTERIA: CPT

## 2021-07-01 NOTE — OUTREACH NOTE
COPD/PN Week 2 Survey      Responses   [facility] patient discharged from?  [place]   Does the patient have one of the following disease processes/diagnoses(primary or secondary)?  COPD/Pneumonia   Was the primary reason for admission:  COPD exacerbation   Week 2 attempt successful?  No   Unsuccessful attempts  Attempt 2          Romelia Su LPN

## 2021-07-02 ENCOUNTER — TELEPHONE (OUTPATIENT)
Dept: INFECTIOUS DISEASES | Facility: CLINIC | Age: 66
End: 2021-07-02

## 2021-07-02 ENCOUNTER — APPOINTMENT (OUTPATIENT)
Dept: GENERAL RADIOLOGY | Facility: HOSPITAL | Age: 66
End: 2021-07-02

## 2021-07-02 ENCOUNTER — DOCUMENTATION (OUTPATIENT)
Dept: INFECTIOUS DISEASES | Facility: CLINIC | Age: 66
End: 2021-07-02

## 2021-07-02 ENCOUNTER — READMISSION MANAGEMENT (OUTPATIENT)
Dept: CALL CENTER | Facility: HOSPITAL | Age: 66
End: 2021-07-02

## 2021-07-02 ENCOUNTER — HOSPITAL ENCOUNTER (INPATIENT)
Facility: HOSPITAL | Age: 66
LOS: 5 days | Discharge: HOME HEALTH | End: 2021-07-07
Attending: EMERGENCY MEDICINE | Admitting: INTERNAL MEDICINE

## 2021-07-02 DIAGNOSIS — A41.9 SEPTICEMIA (HCC): Primary | ICD-10-CM

## 2021-07-02 DIAGNOSIS — E87.6 HYPOKALEMIA: ICD-10-CM

## 2021-07-02 DIAGNOSIS — D72.825 BANDEMIA: ICD-10-CM

## 2021-07-02 DIAGNOSIS — I33.0 ACUTE BACTERIAL ENDOCARDITIS: ICD-10-CM

## 2021-07-02 DIAGNOSIS — J44.1 COPD EXACERBATION (HCC): ICD-10-CM

## 2021-07-02 DIAGNOSIS — R51.9 ACUTE NONINTRACTABLE HEADACHE, UNSPECIFIED HEADACHE TYPE: ICD-10-CM

## 2021-07-02 LAB
ALBUMIN SERPL-MCNC: 4.2 G/DL (ref 3.5–5.2)
ALBUMIN/GLOB SERPL: 1.6 G/DL
ALP SERPL-CCNC: 72 U/L (ref 39–117)
ALT SERPL W P-5'-P-CCNC: 18 U/L (ref 1–33)
ANION GAP SERPL CALCULATED.3IONS-SCNC: 11.2 MMOL/L (ref 5–15)
AST SERPL-CCNC: 20 U/L (ref 1–32)
BASOPHILS # BLD AUTO: 0.06 10*3/MM3 (ref 0–0.2)
BASOPHILS NFR BLD AUTO: 0.6 % (ref 0–1.5)
BILIRUB SERPL-MCNC: 1 MG/DL (ref 0–1.2)
BUN SERPL-MCNC: 13 MG/DL (ref 8–23)
BUN/CREAT SERPL: 16.5 (ref 7–25)
CALCIUM SPEC-SCNC: 9 MG/DL (ref 8.6–10.5)
CHLORIDE SERPL-SCNC: 94 MMOL/L (ref 98–107)
CO2 SERPL-SCNC: 33.8 MMOL/L (ref 22–29)
CREAT SERPL-MCNC: 0.79 MG/DL (ref 0.57–1)
CRP SERPL-MCNC: 6.14 MG/DL (ref 0–0.5)
D-LACTATE SERPL-SCNC: 0.9 MMOL/L (ref 0.5–2)
DEPRECATED RDW RBC AUTO: 47 FL (ref 37–54)
EOSINOPHIL # BLD AUTO: 0.09 10*3/MM3 (ref 0–0.4)
EOSINOPHIL NFR BLD AUTO: 0.9 % (ref 0.3–6.2)
ERYTHROCYTE [DISTWIDTH] IN BLOOD BY AUTOMATED COUNT: 15 % (ref 12.3–15.4)
ERYTHROCYTE [SEDIMENTATION RATE] IN BLOOD: 30 MM/HR (ref 0–30)
GFR SERPL CREATININE-BSD FRML MDRD: 72 ML/MIN/1.73
GLOBULIN UR ELPH-MCNC: 2.6 GM/DL
GLUCOSE SERPL-MCNC: 117 MG/DL (ref 65–99)
HCT VFR BLD AUTO: 36.9 % (ref 34–46.6)
HGB BLD-MCNC: 12.1 G/DL (ref 12–15.9)
IMM GRANULOCYTES # BLD AUTO: 0.09 10*3/MM3 (ref 0–0.05)
IMM GRANULOCYTES NFR BLD AUTO: 0.9 % (ref 0–0.5)
LYMPHOCYTES # BLD AUTO: 1.01 10*3/MM3 (ref 0.7–3.1)
LYMPHOCYTES NFR BLD AUTO: 10.4 % (ref 19.6–45.3)
MAGNESIUM SERPL-MCNC: 2 MG/DL (ref 1.6–2.4)
MCH RBC QN AUTO: 28.5 PG (ref 26.6–33)
MCHC RBC AUTO-ENTMCNC: 32.8 G/DL (ref 31.5–35.7)
MCV RBC AUTO: 86.8 FL (ref 79–97)
MONOCYTES # BLD AUTO: 0.3 10*3/MM3 (ref 0.1–0.9)
MONOCYTES NFR BLD AUTO: 3.1 % (ref 5–12)
NEUTROPHILS NFR BLD AUTO: 8.14 10*3/MM3 (ref 1.7–7)
NEUTROPHILS NFR BLD AUTO: 84.1 % (ref 42.7–76)
NRBC BLD AUTO-RTO: 0 /100 WBC (ref 0–0.2)
PLATELET # BLD AUTO: 147 10*3/MM3 (ref 140–450)
PMV BLD AUTO: 11.2 FL (ref 6–12)
POTASSIUM SERPL-SCNC: 2.7 MMOL/L (ref 3.5–5.2)
PROCALCITONIN SERPL-MCNC: 0.28 NG/ML (ref 0–0.25)
PROT SERPL-MCNC: 6.8 G/DL (ref 6–8.5)
RBC # BLD AUTO: 4.25 10*6/MM3 (ref 3.77–5.28)
SARS-COV-2 RNA RESP QL NAA+PROBE: NOT DETECTED
SODIUM SERPL-SCNC: 139 MMOL/L (ref 136–145)
WBC # BLD AUTO: 9.69 10*3/MM3 (ref 3.4–10.8)

## 2021-07-02 PROCEDURE — 25010000003 POTASSIUM CHLORIDE 10 MEQ/100ML SOLUTION: Performed by: EMERGENCY MEDICINE

## 2021-07-02 PROCEDURE — 99223 1ST HOSP IP/OBS HIGH 75: CPT | Performed by: INTERNAL MEDICINE

## 2021-07-02 PROCEDURE — 71045 X-RAY EXAM CHEST 1 VIEW: CPT

## 2021-07-02 PROCEDURE — 83735 ASSAY OF MAGNESIUM: CPT | Performed by: EMERGENCY MEDICINE

## 2021-07-02 PROCEDURE — 99284 EMERGENCY DEPT VISIT MOD MDM: CPT

## 2021-07-02 PROCEDURE — 83605 ASSAY OF LACTIC ACID: CPT | Performed by: NURSE PRACTITIONER

## 2021-07-02 PROCEDURE — 87150 DNA/RNA AMPLIFIED PROBE: CPT | Performed by: EMERGENCY MEDICINE

## 2021-07-02 PROCEDURE — 87040 BLOOD CULTURE FOR BACTERIA: CPT | Performed by: EMERGENCY MEDICINE

## 2021-07-02 PROCEDURE — 86140 C-REACTIVE PROTEIN: CPT | Performed by: NURSE PRACTITIONER

## 2021-07-02 PROCEDURE — 85025 COMPLETE CBC W/AUTO DIFF WBC: CPT | Performed by: NURSE PRACTITIONER

## 2021-07-02 PROCEDURE — 25010000002 ONDANSETRON PER 1 MG: Performed by: INTERNAL MEDICINE

## 2021-07-02 PROCEDURE — 80053 COMPREHEN METABOLIC PANEL: CPT | Performed by: NURSE PRACTITIONER

## 2021-07-02 PROCEDURE — 85652 RBC SED RATE AUTOMATED: CPT | Performed by: NURSE PRACTITIONER

## 2021-07-02 PROCEDURE — U0003 INFECTIOUS AGENT DETECTION BY NUCLEIC ACID (DNA OR RNA); SEVERE ACUTE RESPIRATORY SYNDROME CORONAVIRUS 2 (SARS-COV-2) (CORONAVIRUS DISEASE [COVID-19]), AMPLIFIED PROBE TECHNIQUE, MAKING USE OF HIGH THROUGHPUT TECHNOLOGIES AS DESCRIBED BY CMS-2020-01-R: HCPCS | Performed by: EMERGENCY MEDICINE

## 2021-07-02 PROCEDURE — 25010000002 MAGNESIUM SULFATE 2 GM/50ML SOLUTION: Performed by: EMERGENCY MEDICINE

## 2021-07-02 PROCEDURE — 25010000003 CEFTRIAXONE PER 250 MG: Performed by: NURSE PRACTITIONER

## 2021-07-02 PROCEDURE — 84145 PROCALCITONIN (PCT): CPT | Performed by: NURSE PRACTITIONER

## 2021-07-02 PROCEDURE — 25010000002 ENOXAPARIN PER 10 MG: Performed by: INTERNAL MEDICINE

## 2021-07-02 RX ORDER — ONDANSETRON 4 MG/1
4 TABLET, FILM COATED ORAL EVERY 6 HOURS PRN
Status: DISCONTINUED | OUTPATIENT
Start: 2021-07-02 | End: 2021-07-07 | Stop reason: HOSPADM

## 2021-07-02 RX ORDER — FUROSEMIDE 40 MG/1
40 TABLET ORAL 2 TIMES DAILY
Status: DISCONTINUED | OUTPATIENT
Start: 2021-07-02 | End: 2021-07-07 | Stop reason: HOSPADM

## 2021-07-02 RX ORDER — UREA 10 %
3 LOTION (ML) TOPICAL NIGHTLY PRN
Status: DISCONTINUED | OUTPATIENT
Start: 2021-07-02 | End: 2021-07-07 | Stop reason: HOSPADM

## 2021-07-02 RX ORDER — POTASSIUM CHLORIDE 7.45 MG/ML
10 INJECTION INTRAVENOUS ONCE
Status: COMPLETED | OUTPATIENT
Start: 2021-07-02 | End: 2021-07-02

## 2021-07-02 RX ORDER — MAGNESIUM SULFATE HEPTAHYDRATE 40 MG/ML
2 INJECTION, SOLUTION INTRAVENOUS ONCE
Status: COMPLETED | OUTPATIENT
Start: 2021-07-02 | End: 2021-07-02

## 2021-07-02 RX ORDER — CEFTRIAXONE SODIUM 2 G/50ML
2 INJECTION, SOLUTION INTRAVENOUS EVERY 24 HOURS
Status: DISCONTINUED | OUTPATIENT
Start: 2021-07-02 | End: 2021-07-06

## 2021-07-02 RX ORDER — CETIRIZINE HYDROCHLORIDE 10 MG/1
10 TABLET ORAL DAILY
Status: DISCONTINUED | OUTPATIENT
Start: 2021-07-02 | End: 2021-07-07 | Stop reason: HOSPADM

## 2021-07-02 RX ORDER — POTASSIUM CHLORIDE 750 MG/1
40 TABLET, FILM COATED, EXTENDED RELEASE ORAL AS NEEDED
Status: DISCONTINUED | OUTPATIENT
Start: 2021-07-02 | End: 2021-07-07 | Stop reason: HOSPADM

## 2021-07-02 RX ORDER — POTASSIUM CHLORIDE 7.45 MG/ML
10 INJECTION INTRAVENOUS ONCE
Status: DISCONTINUED | OUTPATIENT
Start: 2021-07-02 | End: 2021-07-07 | Stop reason: HOSPADM

## 2021-07-02 RX ORDER — MAGNESIUM OXIDE 400 MG/1
400 TABLET ORAL DAILY
Status: DISCONTINUED | OUTPATIENT
Start: 2021-07-02 | End: 2021-07-07 | Stop reason: HOSPADM

## 2021-07-02 RX ORDER — AMLODIPINE BESYLATE 10 MG/1
10 TABLET ORAL DAILY
Status: DISCONTINUED | OUTPATIENT
Start: 2021-07-02 | End: 2021-07-07 | Stop reason: HOSPADM

## 2021-07-02 RX ORDER — BENZONATATE 100 MG/1
200 CAPSULE ORAL 3 TIMES DAILY PRN
Status: DISCONTINUED | OUTPATIENT
Start: 2021-07-02 | End: 2021-07-07 | Stop reason: HOSPADM

## 2021-07-02 RX ORDER — POTASSIUM CHLORIDE 7.45 MG/ML
10 INJECTION INTRAVENOUS
Status: DISCONTINUED | OUTPATIENT
Start: 2021-07-02 | End: 2021-07-07 | Stop reason: HOSPADM

## 2021-07-02 RX ORDER — ATORVASTATIN CALCIUM 20 MG/1
40 TABLET, FILM COATED ORAL DAILY
Status: DISCONTINUED | OUTPATIENT
Start: 2021-07-02 | End: 2021-07-07 | Stop reason: HOSPADM

## 2021-07-02 RX ORDER — ROPINIROLE 2 MG/1
2 TABLET, FILM COATED ORAL NIGHTLY
Status: DISCONTINUED | OUTPATIENT
Start: 2021-07-02 | End: 2021-07-07 | Stop reason: HOSPADM

## 2021-07-02 RX ORDER — ALBUTEROL SULFATE 2.5 MG/3ML
2.5 SOLUTION RESPIRATORY (INHALATION) EVERY 6 HOURS PRN
Status: DISCONTINUED | OUTPATIENT
Start: 2021-07-02 | End: 2021-07-07 | Stop reason: HOSPADM

## 2021-07-02 RX ORDER — ACETAMINOPHEN 325 MG/1
650 TABLET ORAL EVERY 4 HOURS PRN
Status: DISCONTINUED | OUTPATIENT
Start: 2021-07-02 | End: 2021-07-07 | Stop reason: HOSPADM

## 2021-07-02 RX ORDER — SERTRALINE HYDROCHLORIDE 100 MG/1
100 TABLET, FILM COATED ORAL DAILY
Status: DISCONTINUED | OUTPATIENT
Start: 2021-07-02 | End: 2021-07-07 | Stop reason: HOSPADM

## 2021-07-02 RX ORDER — CARVEDILOL 6.25 MG/1
6.25 TABLET ORAL 2 TIMES DAILY WITH MEALS
Status: DISCONTINUED | OUTPATIENT
Start: 2021-07-02 | End: 2021-07-07 | Stop reason: HOSPADM

## 2021-07-02 RX ORDER — ROFLUMILAST 500 UG/1
500 TABLET ORAL DAILY
Status: DISCONTINUED | OUTPATIENT
Start: 2021-07-02 | End: 2021-07-07 | Stop reason: HOSPADM

## 2021-07-02 RX ORDER — POTASSIUM CHLORIDE 1.5 G/1.77G
40 POWDER, FOR SOLUTION ORAL AS NEEDED
Status: DISCONTINUED | OUTPATIENT
Start: 2021-07-02 | End: 2021-07-07 | Stop reason: HOSPADM

## 2021-07-02 RX ORDER — NITROGLYCERIN 0.4 MG/1
0.4 TABLET SUBLINGUAL
Status: DISCONTINUED | OUTPATIENT
Start: 2021-07-02 | End: 2021-07-07 | Stop reason: HOSPADM

## 2021-07-02 RX ORDER — HYDROCODONE BITARTRATE AND ACETAMINOPHEN 5; 325 MG/1; MG/1
1 TABLET ORAL EVERY 8 HOURS PRN
Status: DISCONTINUED | OUTPATIENT
Start: 2021-07-02 | End: 2021-07-07 | Stop reason: HOSPADM

## 2021-07-02 RX ORDER — POTASSIUM CHLORIDE 750 MG/1
20 TABLET, FILM COATED, EXTENDED RELEASE ORAL 2 TIMES DAILY WITH MEALS
Status: DISCONTINUED | OUTPATIENT
Start: 2021-07-02 | End: 2021-07-07 | Stop reason: HOSPADM

## 2021-07-02 RX ORDER — ASPIRIN 81 MG/1
81 TABLET ORAL DAILY
Status: DISCONTINUED | OUTPATIENT
Start: 2021-07-02 | End: 2021-07-07 | Stop reason: HOSPADM

## 2021-07-02 RX ORDER — CEFTRIAXONE SODIUM 2 G/50ML
2 INJECTION, SOLUTION INTRAVENOUS ONCE
Status: COMPLETED | OUTPATIENT
Start: 2021-07-02 | End: 2021-07-02

## 2021-07-02 RX ORDER — BUDESONIDE AND FORMOTEROL FUMARATE DIHYDRATE 160; 4.5 UG/1; UG/1
2 AEROSOL RESPIRATORY (INHALATION)
Refills: 3 | Status: DISCONTINUED | OUTPATIENT
Start: 2021-07-02 | End: 2021-07-07 | Stop reason: HOSPADM

## 2021-07-02 RX ORDER — ONDANSETRON 2 MG/ML
4 INJECTION INTRAMUSCULAR; INTRAVENOUS EVERY 6 HOURS PRN
Status: DISCONTINUED | OUTPATIENT
Start: 2021-07-02 | End: 2021-07-07 | Stop reason: HOSPADM

## 2021-07-02 RX ORDER — GUAIFENESIN 600 MG/1
600 TABLET, EXTENDED RELEASE ORAL 2 TIMES DAILY
Status: DISCONTINUED | OUTPATIENT
Start: 2021-07-02 | End: 2021-07-07 | Stop reason: HOSPADM

## 2021-07-02 RX ORDER — CYCLOBENZAPRINE HCL 10 MG
10 TABLET ORAL NIGHTLY
Status: DISCONTINUED | OUTPATIENT
Start: 2021-07-02 | End: 2021-07-07 | Stop reason: HOSPADM

## 2021-07-02 RX ORDER — HYDROCODONE BITARTRATE AND ACETAMINOPHEN 5; 325 MG/1; MG/1
1 TABLET ORAL ONCE
Status: COMPLETED | OUTPATIENT
Start: 2021-07-02 | End: 2021-07-02

## 2021-07-02 RX ORDER — SODIUM CHLORIDE 0.9 % (FLUSH) 0.9 %
10 SYRINGE (ML) INJECTION AS NEEDED
Status: DISCONTINUED | OUTPATIENT
Start: 2021-07-02 | End: 2021-07-07 | Stop reason: HOSPADM

## 2021-07-02 RX ORDER — PANTOPRAZOLE SODIUM 40 MG/1
40 TABLET, DELAYED RELEASE ORAL EVERY MORNING
Refills: 1 | Status: DISCONTINUED | OUTPATIENT
Start: 2021-07-03 | End: 2021-07-07 | Stop reason: HOSPADM

## 2021-07-02 RX ADMIN — POTASSIUM CHLORIDE 10 MEQ: 10 INJECTION, SOLUTION INTRAVENOUS at 17:20

## 2021-07-02 RX ADMIN — ONDANSETRON 4 MG: 2 INJECTION INTRAMUSCULAR; INTRAVENOUS at 22:51

## 2021-07-02 RX ADMIN — ROPINIROLE 2 MG: 2 TABLET, FILM COATED ORAL at 20:41

## 2021-07-02 RX ADMIN — GUAIFENESIN 600 MG: 600 TABLET, EXTENDED RELEASE ORAL at 20:52

## 2021-07-02 RX ADMIN — ATORVASTATIN CALCIUM 40 MG: 20 TABLET, FILM COATED ORAL at 20:52

## 2021-07-02 RX ADMIN — AMLODIPINE BESYLATE 10 MG: 10 TABLET ORAL at 20:52

## 2021-07-02 RX ADMIN — CEFTRIAXONE SODIUM 2 G: 2 INJECTION, SOLUTION INTRAVENOUS at 16:12

## 2021-07-02 RX ADMIN — CARVEDILOL 6.25 MG: 6.25 TABLET, FILM COATED ORAL at 20:52

## 2021-07-02 RX ADMIN — HYDROCODONE BITARTRATE AND ACETAMINOPHEN 1 TABLET: 5; 325 TABLET ORAL at 17:20

## 2021-07-02 RX ADMIN — ASPIRIN 81 MG: 81 TABLET, COATED ORAL at 20:52

## 2021-07-02 RX ADMIN — MAGNESIUM SULFATE HEPTAHYDRATE 2 G: 2 INJECTION, SOLUTION INTRAVENOUS at 17:21

## 2021-07-02 RX ADMIN — CETIRIZINE HYDROCHLORIDE ALLERGY 10 MG: 10 TABLET ORAL at 20:38

## 2021-07-02 RX ADMIN — POTASSIUM CHLORIDE 40 MEQ: 750 TABLET, EXTENDED RELEASE ORAL at 20:39

## 2021-07-02 RX ADMIN — CYCLOBENZAPRINE 10 MG: 10 TABLET, FILM COATED ORAL at 20:38

## 2021-07-02 RX ADMIN — FUROSEMIDE 40 MG: 40 TABLET ORAL at 20:52

## 2021-07-02 RX ADMIN — ENOXAPARIN SODIUM 40 MG: 40 INJECTION SUBCUTANEOUS at 20:52

## 2021-07-02 RX ADMIN — SERTRALINE 100 MG: 100 TABLET, FILM COATED ORAL at 20:41

## 2021-07-02 RX ADMIN — MAGNESIUM OXIDE 400 MG (241.3 MG MAGNESIUM) TABLET 400 MG: TABLET at 20:39

## 2021-07-02 RX ADMIN — ROFLUMILAST 500 MCG: 500 TABLET ORAL at 20:41

## 2021-07-02 NOTE — H&P
HISTORY AND PHYSICAL   [facility]        Patient Identification:  Name: Paz Browne  Age: 66 y.o.  Sex: female  :  1955  MRN: [id number]                     Primary Care Physician: Javan Martinez MD    Chief Complaint:  66 year old female who presented to the emergency room with chills,, shakes and fever; she has had a productive cough; she recently completed antibiotics for strep bacteremia and repeat blood cultures remained positive so she was advised to go to the ED by dr woodson; she has had malaise as noted which started shortly after she finished her antibiotics    History of Present Illness:   As above    Past Medical History:  Past Medical History:   Diagnosis Date   • VAN (acute kidney injury) (CMS/[facility])    • Anemia    • Asthma    • Atrial flutter (CMS/[facility])     cardioversion   • Cataract    • Celiac artery stenosis (CMS/[facility])    • CHF (congestive heart failure) (CMS/[facility])    • Chronic respiratory failure with hypoxia (CMS/[facility])    • Colon polyp    • COPD (chronic obstructive pulmonary disease) (CMS/[facility])    • Emphysema of lung (CMS/[facility])    • GI bleed    • Hiatal hernia    • History of CHF (congestive heart failure)     due to MR   • History of home oxygen therapy     3 lpm [place]   • History of mitral valve replacement with tissue graft    • Hyperlipidemia    • Hypertension    • Infectious viral hepatitis     B   • Intertrigo    • Long term (current) use of anticoagulants    • Mitral regurgitation     s/p tissue MVR   • PAF (paroxysmal atrial fibrillation) (CMS/[facility])     s/p MAZE   • Pneumonia    • Pulmonary hypertension (CMS/[facility])    • S/P TVR (tricuspid valve repair) 2016     Past Surgical History:  Past Surgical History:   Procedure Laterality Date   • CARDIAC CATHETERIZATION  2014    Right dominant systemt, normal coronary arteries.    • CARDIAC CATHETERIZATION Left 6/10/2016    Procedure: Cardiac catheterization;  Surgeon: Sergei Hall MD;  Location: [facility] INVASIVE  LOCATION;  Service:    • CARDIAC CATHETERIZATION N/A 6/10/2016    Procedure: Right Heart Cath;  Surgeon: Sergei Hall MD;  Location: [facility] CATH INVASIVE LOCATION;  Service:    • CATARACT EXTRACTION     • COLONOSCOPY     • COLONOSCOPY N/A 8/4/2017    Procedure: COLONOSCOPY TO CECUM/TI WITH POLYPECTOMY ( COLD BX);  Surgeon: Cleveland Devine MD;  Location: [facility] ENDOSCOPY;  Service:    • COLONOSCOPY N/A 8/10/2017    Procedure: COLONOSCOPY to cecum and TI with 2 clips placed at transverse;  Surgeon: Earnest PALOMO MD;  Location: [facility] ENDOSCOPY;  Service:    • COLONOSCOPY N/A 12/22/2017    Procedure: COLONOSCOPY INTO CECUM WITH COLD POLYPECTOMIES;  Surgeon: Cleveland Devine MD;  Location: [facility] ENDOSCOPY;  Service:    • COLONOSCOPY N/A 5/17/2021    Procedure: COLONOSCOPY to cecum;  Surgeon: Cleveland Devine MD;  Location: [facility] ENDOSCOPY;  Service: Gastroenterology;  Laterality: N/A;  Pre: Fe deficency anemia, h/x of polyps  Post: fair prep, normal   • ENDOSCOPY N/A 8/17/2017    Procedure: ESOPHAGOGASTRODUODENOSCOPY;  Surgeon: Porsha Ruby MD;  Location: [facility] ENDOSCOPY;  Service:    • ENDOSCOPY N/A 12/22/2017    Procedure: ESOPHAGOGASTRODUODENOSCOPY WITH BIOPSIES;  Surgeon: Cleveland Devine MD;  Location: [facility] ENDOSCOPY;  Service:    • ENDOSCOPY N/A 5/17/2021    Procedure: ESOPHAGOGASTRODUODENOSCOPY  with biopsies;  Surgeon: Cleveland Devine MD;  Location: [facility] ENDOSCOPY;  Service: Gastroenterology;  Laterality: N/A;  Pre: Fe deficency anemia, nausea, heme positive stool   Post: gastritis, sloughing of distal esophagus mucosa   • GALLBLADDER SURGERY     • HEMORRHOIDECTOMY     • HYSTERECTOMY     • KIDNEY SURGERY  04/22/2013    Stent placement   • MAZE PROCEDURE     • MITRAL VALVE REPLACEMENT     • TONSILLECTOMY     • TRICUSPID VALVE REPLACEMENT        Home Meds:  Medications Prior to Admission   Medication Sig Dispense Refill Last Dose   • albuterol (PROVENTIL) (2.5 MG/3ML) 0.083% nebulizer solution INHALE 1 VIAL BY  MOUTH EVERY 4 HOURS AS NEEDED FOR WHEEZING (Patient taking differently: Take 2.5 mg by nebulization Every 6 (Six) Hours As Needed for Wheezing (uses 2 to 3 times per day).) 150 mL 3    • albuterol sulfate  (90 Base) MCG/ACT inhaler Inhale 2 puffs Every 4 (Four) Hours As Needed for Wheezing. 18 g 3    • amLODIPine (NORVASC) 10 MG tablet TAKE 1 TABLET BY MOUTH EVERY DAY (Patient taking differently: Take 10 mg by mouth Daily.) 90 tablet 1    • atorvastatin (LIPITOR) 40 MG tablet TAKE 1 TABLET BY MOUTH EVERY DAY (Patient taking differently: Take 40 mg by mouth Daily.) 90 tablet 2    • carvedilol (COREG) 6.25 MG tablet TAKE 1 TABLET BY MOUTH TWICE A DAY WITH MEALS (Patient taking differently: Take 6.25 mg by mouth 2 (Two) Times a Day With Meals.) 180 tablet 2    • cyclobenzaprine (FLEXERIL) 10 MG tablet TAKE 1 TABLET BY MOUTH AT BEDTIME (Patient taking differently: Take 10 mg by mouth every night at bedtime.) 90 tablet 3    • fluticasone-salmeterol (Advair Diskus) 250-50 MCG/DOSE DISKUS Inhale 1 puff 2 (Two) Times a Day. 3 each 3    • furosemide (LASIX) 40 MG tablet Take 1 tablet by mouth Daily. (Patient taking differently: Take 40 mg by mouth 2 (Two) Times a Day.) 30 tablet 3    • HYDROcodone-acetaminophen (NORCO) 5-325 MG per tablet Take 1 tablet by mouth Every 8 (Eight) Hours As Needed for Moderate Pain . Chronic pain medicine for low back pain 90 tablet 0    • omeprazole (priLOSEC) 40 MG capsule TAKE 1 CAPSULE BY MOUTH EVERY DAY (Patient taking differently: Take 40 mg by mouth Daily.) 90 capsule 1    • potassium chloride 10 MEQ CR tablet 1 p.o. daily (Patient taking differently: Take 10 mEq by mouth Daily. 1 p.o. daily) 30 tablet 5    • rOPINIRole (REQUIP) 2 MG tablet TAKE 1 TABLET BY MOUTH EVERY DAY EVERY NIGHT (Patient taking differently: Take 2 mg by mouth Every Night.) 90 tablet 2    • sertraline (ZOLOFT) 100 MG tablet TAKE 1 TABLET BY MOUTH EVERY DAY (Patient taking differently: Take 100 mg by mouth  Daily.) 90 tablet 1    • tiotropium (Spiriva HandiHaler) 18 MCG per inhalation capsule Place 1 capsule into inhaler and inhale Daily. 90 capsule 3    • zaleplon (SONATA) 10 MG capsule TAKE 1 CAPSULE BY MOUTH EVERY NIGHT (Patient taking differently: Take 10 mg by mouth Every Night.) 30 capsule 3    • aspirin 81 MG EC tablet Take 1 tablet by mouth Daily.      • benzonatate (TESSALON) 100 MG capsule TAKE 1 CAPSULE BY MOUTH 2 TIMES A DAY AS NEEDED FOR COUGH 180 capsule 1    • guaiFENesin (MUCINEX) 600 MG 12 hr tablet Take 600 mg by mouth 2 (Two) Times a Day.      • loratadine (Claritin) 10 MG tablet Take 10 mg by mouth 2 (two) times a day.      • magnesium oxide (MAG-OX) 400 MG tablet TAKE 1 TABLET BY MOUTH EVERY DAY 90 tablet 1    • meclizine (ANTIVERT) 25 MG tablet Take 1 tablet by mouth 3 (Three) Times a Day As Needed for Dizziness. prn 30 tablet 3    • Multiple Vitamins-Minerals (MULTIVITAL) tablet Take 1 tablet by mouth Daily.      • Nebulizer device 1 Units 4 (Four) Times a Day As Needed (Wheezing). J44.1 j20.8 1 each 0    • O2 (OXYGEN) Inhale 3 L/min Continuous.      • Omega-3 Fatty Acids (fish oil) 1000 MG capsule capsule Take  by mouth Every Night.      • ondansetron (ZOFRAN) 4 MG tablet Take 1 tablet by mouth Every 12 (Twelve) Hours As Needed for Nausea or Vomiting. 90 tablet 3    • polyethylene glycol (MIRALAX) packet Take 17 g by mouth 2 (Two) Times a Day. (Patient taking differently: Take 17 g by mouth 2 (Two) Times a Day As Needed.)      • predniSONE (DELTASONE) 10 MG tablet Take 4 tabs daily x 3 days, then take 3 tabs daily x 3 days, then take 2 tabs daily x 3 days, then take 1 tab daily x 3 days 31 tablet 0    • roflumilast (DALIRESP) 500 MCG tablet tablet Take 1 tablet by mouth Daily. 90 tablet 0    • valACYclovir (Valtrex) 1000 MG tablet 1 p.o. 3 times daily x1 week 21 tablet 0        Allergies:  Allergies   Allergen Reactions   • Bupropion Itching   • Cephalexin Itching     Tolerated  piperacillin/tazobactam, ampicillin, cefdinir, and ceftriaxone   • Metoprolol Itching     Immunizations:  Immunization History   Administered Date(s) Administered   • COVID-19 (PFIZER) 2021, 2021   • Flu Mist 10/30/2015   • Fluzone High Dose =>65 Years ([place] ONLY) 10/23/2018   • Pneumococcal Conjugate 13-Valent (PCV13) 2016   • Pneumococcal Polysaccharide (PPSV23) 10/30/2015   • Tdap 2016     Social History:   Social History     Social History Narrative   • Not on file     Social History     Socioeconomic History   • Marital status:      Spouse name: Not on file   • Number of children: 2   • Years of education: Not on file   • Highest education level: Not on file   Tobacco Use   • Smoking status: Former Smoker     Quit date:      Years since quittin.5   • Smokeless tobacco: Never Used   Substance and Sexual Activity   • Alcohol use: No     Comment: caffeine use   • Drug use: No   • Sexual activity: Defer       Family History:  Family History   Adopted: Yes   Problem Relation Age of Onset   • No Known Problems Mother    • No Known Problems Father         Review of Systems  See history of present illness and past medical history.  Patient denies headache, dizziness, syncope, falls, trauma, change in vision, change in hearing, change in taste, changes in weight, changes in appetite, focal weakness, numbness, or paresthesia.  Patient denies chest pain, palpitations, dyspnea, orthopnea, PND, cough, sinus pressure, rhinorrhea, epistaxis, hemoptysis, nausea, vomiting,hematemesis, diarrhea, constipation or hematchezia.  Denies cold or heat intolerance, polydipsia, polyuria, polyphagia. Denies hematuria, pyuria, dysuria, hesitancy, frequency or urgency. Denies consumption of raw and under cooked meats foods or change in water source.  Denies sweats, night sweats.  Denies missing any routine medications. Remainder of ROS is negative.    Objective:  T Max 24 hrs: Temp (24hrs),  "Av.4 °F (35.8 °C), Min:96.4 °F (35.8 °C), Max:96.4 °F (35.8 °C)    Vitals Ranges:   Temp:  [96.4 °F (35.8 °C)] 96.4 °F (35.8 °C)  Heart Rate:  [97] 97  Resp:  [20] 20  BP: (152-162)/(73-98) 162/73      Exam:  /73   Pulse 97   Temp 96.4 °F (35.8 °C) (Tympanic)   Resp 20   Ht 170.2 cm (67\")   Wt 59 kg (130 lb)   SpO2 96%   BMI 20.36 kg/m²     General Appearance:    Alert, cooperative, no distress, appears stated age; chronically ill appearing   Head:    Normocephalic, without obvious abnormality, atraumatic   Eyes:    PERRL, conjunctivae/corneas clear, EOM's intact, both eyes   Ears:    Normal external ear canals, both ears   Nose:   Nares normal, septum midline, mucosa normal, no drainage    or sinus tenderness   Throat:   Lips, mucosa, and tongue normal   Neck:   Supple, symmetrical, trachea midline, no adenopathy;     thyroid:  no enlargement/tenderness/nodules; no carotid    bruit or JVD   Back:     Symmetric, no curvature, ROM normal, no CVA tenderness   Lungs:     Decreased breath sounds bilaterally, respirations unlabored   Chest Wall:    No tenderness or deformity    Heart:    Regular rate and rhythm, S1 and S2 normal, no murmur, rub   or gallop   Abdomen:     Soft, nontender, bowel sounds active all four quadrants,     no masses, no hepatomegaly, no splenomegaly   Extremities:   Extremities normal, atraumatic, no cyanosis or edema   Pulses:   2+ and symmetric all extremities   Skin:   Skin color, texture, turgor normal, no rashes or lesions   Lymph nodes:   Cervical, supraclavicular, and axillary nodes normal   Neurologic:   CNII-XII intact, normal strength, sensation intact throughout      .    Data Review:  Labs in chart were reviewed.  WBC   Date Value Ref Range Status   2021 9.69 3.40 - 10.80 10*3/mm3 Final     Hemoglobin   Date Value Ref Range Status   2021 12.1 12.0 - 15.9 g/dL Final     Hematocrit   Date Value Ref Range Status   2021 36.9 34.0 - 46.6 % Final "     Platelets   Date Value Ref Range Status   07/02/2021 147 140 - 450 10*3/mm3 Final     Sodium   Date Value Ref Range Status   07/02/2021 139 136 - 145 mmol/L Final     Potassium   Date Value Ref Range Status   07/02/2021 2.7 (L) 3.5 - 5.2 mmol/L Final     Chloride   Date Value Ref Range Status   07/02/2021 94 (L) 98 - 107 mmol/L Final     CO2   Date Value Ref Range Status   07/02/2021 33.8 (H) 22.0 - 29.0 mmol/L Final     BUN   Date Value Ref Range Status   07/02/2021 13 8 - 23 mg/dL Final     Creatinine   Date Value Ref Range Status   07/02/2021 0.79 0.57 - 1.00 mg/dL Final     Glucose   Date Value Ref Range Status   07/02/2021 117 (H) 65 - 99 mg/dL Final     Calcium   Date Value Ref Range Status   07/02/2021 9.0 8.6 - 10.5 mg/dL Final     Magnesium   Date Value Ref Range Status   07/02/2021 2.0 1.6 - 2.4 mg/dL Final     AST (SGOT)   Date Value Ref Range Status   07/02/2021 20 1 - 32 U/L Final     ALT (SGPT)   Date Value Ref Range Status   07/02/2021 18 1 - 33 U/L Final     Alkaline Phosphatase   Date Value Ref Range Status   07/02/2021 72 39 - 117 U/L Final                Imaging Results (All)     Procedure Component Value Units Date/Time    XR Chest 1 View [[id number]] Collected: 07/02/21 1547     Updated: 07/02/21 1555    Narrative:      XR CHEST 1 VW-     HISTORY: Female who is 66 years-old,  infection     TECHNIQUE: Frontal view of the chest     COMPARISON: 06/14/2021     FINDINGS: The patient is rotated towards the right. The heart size is  normal. Aorta is calcified. Sternotomy wires are noted. Pulmonary  vasculature is unremarkable. Old granulomatous disease is present.  Emphysematous changes are present. Minimal likely atelectasis or  infiltrate left lower lung. No pleural effusion, or pneumothorax. No  acute osseous process.       Impression:      Minimal likely atelectasis or infiltrate left lower lung.      This report was finalized on 7/2/2021 3:52 PM by Dr. Chapincito Garcia M.D.           Patient  Active Problem List   Diagnosis Code   • PAF (paroxysmal atrial fibrillation) (CMS/[facility]) I48.0   • Hypertension I10   • Hyperlipidemia E78.5   • Pain medication agreement Z02.89   • Hiatal hernia K44.9   • Primary osteoarthritis involving multiple joints M89.49   • Pulmonary hypertension (CMS/[facility]) I27.20   • S/P TVR (tricuspid valve repair) Z98.890   • Pulmonary nodule R91.1   • Hemoptysis R04.2   • RLS (restless legs syndrome) G25.81   • Chronic low back pain M54.5, G89.29   • Dysplastic polyp of colon K63.5   • History of mitral valve replacement with tissue graft Z95.4   • Chronic respiratory failure with hypoxia (CMS/[facility]) J96.11   • Anemia D64.9   • Celiac artery stenosis (CMS/[facility]) I77.4   • Intertrigo L30.4   • COPD (chronic obstructive pulmonary disease) (CMS/[facility]) J44.9   • Abnormal EKG R94.31   • Shingles B02.9   • Vertigo R42   • Muscle spasms of both lower extremities M62.838   • Iron deficiency anemia D50.9   • Adenomatous polyp of colon D12.6   • Dark stools R19.5   • Gastroesophageal reflux disease without esophagitis K21.9   • Anemia D64.9   • COPD exacerbation (CMS/[facility]) J44.1   • Septicemia (CMS/[facility]) A41.9       Assessment:    Septicemia (CMS/[facility])  hypokalemia  Strep bacteremia  Chronic diastolic chf  Chronic hypoxic respiratory failure  Hyperglycemia  Atrial fibrillation  Pulmonary hypertension  underweight    Plan:  Rocephin was started  Id to see  May need a mina  Trend labs  Replace potassium  Monitor on telemetry  D.w ED Provider    Susanne Bello MD  7/2/2021  18:50 EDT

## 2021-07-02 NOTE — PROGRESS NOTES
I asked Ms. Browne to come back to the lab for blood cultures after finishing her course of levofloxacin. She did. Her blood cultures are again positive for what appears to be Strep species. This is concerning in light of her history of MV replacement and TV repair. The TTE in the hospital showed good views of the bioprosthetic valve. However, she may need a CHIO at this point. Our office is calling her now to present to the ER for further evaluation and admission. I would recommend that we start ceftriaxone 2 g IV q24h and please place an ID consultation so we can see her in the hospital. Thank you.

## 2021-07-02 NOTE — CONSULTS
Referring Provider: Dr Buchanan    Reason for Consultation: Strep sepsis    History of present illness:  Paz Browne is a 66 y.o. who I am asked to evaluate and give opinion for Group B Strep sepsis. History is obtained from the patient and review of the old medical records which I summarize/synthesize as follows: I saw her a few weeks ago for Strep septicemia and COPD exacerbation due to Pseudomonas and coronavirus NL63. During that admission, she cleared her blood cultures quickly and TTE was good quality and did not show any MV vegetation. Therefore I recommended levofloxacin 750 mg PO q24h for a 10-day course and asked her to come back to the lab on 7/1/21 for blood cultures which she did. These are now positive for GPCs in chains (presumably Strep). She reports night sweats and chills last night for the first time. Her cough and breathing are at baseline. No new skin lesions. She had an endoscopy in May but I do not see that she had a colonoscopy at the same time. She tells me she does not plan to get one. The last one that I see was in 2017. Polyps were removed.     Past Medical History:   Diagnosis Date   • VAN (acute kidney injury) (CMS/[facility])    • Anemia    • Asthma    • Atrial flutter (CMS/[facility])     cardioversion   • Cataract    • Celiac artery stenosis (CMS/HCC)    • CHF (congestive heart failure) (CMS/HCC)    • Chronic respiratory failure with hypoxia (CMS/HCC)    • Colon polyp    • COPD (chronic obstructive pulmonary disease) (CMS/[facility])    • Emphysema of lung (CMS/HCC)    • GI bleed    • Hiatal hernia    • History of CHF (congestive heart failure)     due to MR   • History of home oxygen therapy     3 lpm NC   • History of mitral valve replacement with tissue graft    • Hyperlipidemia    • Hypertension    • Infectious viral hepatitis     B   • Intertrigo    • Long term (current) use of anticoagulants    • Mitral regurgitation     s/p tissue MVR   • PAF (paroxysmal atrial fibrillation) (CMS/[facility])     s/p  MAZE   • Pneumonia    • Pulmonary hypertension (CMS/HCC)    • S/P TVR (tricuspid valve repair) 7/7/2016       Past Surgical History:   Procedure Laterality Date   • CARDIAC CATHETERIZATION  09/01/2014    Right dominant systemt, normal coronary arteries.    • CARDIAC CATHETERIZATION Left 6/10/2016    Procedure: Cardiac catheterization;  Surgeon: Sergei Hall MD;  Location: [facility] CATH INVASIVE LOCATION;  Service:    • CARDIAC CATHETERIZATION N/A 6/10/2016    Procedure: Right Heart Cath;  Surgeon: Sergei Hall MD;  Location: [facility] CATH INVASIVE LOCATION;  Service:    • CATARACT EXTRACTION     • COLONOSCOPY     • COLONOSCOPY N/A 8/4/2017    Procedure: COLONOSCOPY TO CECUM/TI WITH POLYPECTOMY ( COLD BX);  Surgeon: Cleveland Devine MD;  Location: [facility] ENDOSCOPY;  Service:    • COLONOSCOPY N/A 8/10/2017    Procedure: COLONOSCOPY to cecum and TI with 2 clips placed at transverse;  Surgeon: Earnest PALOMO MD;  Location: [facility] ENDOSCOPY;  Service:    • COLONOSCOPY N/A 12/22/2017    Procedure: COLONOSCOPY INTO CECUM WITH COLD POLYPECTOMIES;  Surgeon: Cleveland Devine MD;  Location: [facility] ENDOSCOPY;  Service:    • COLONOSCOPY N/A 5/17/2021    Procedure: COLONOSCOPY to cecum;  Surgeon: Cleveland Dveine MD;  Location: [facility] ENDOSCOPY;  Service: Gastroenterology;  Laterality: N/A;  Pre: Fe deficency anemia, h/x of polyps  Post: fair prep, normal   • ENDOSCOPY N/A 8/17/2017    Procedure: ESOPHAGOGASTRODUODENOSCOPY;  Surgeon: Porsha Ruby MD;  Location: [facility] ENDOSCOPY;  Service:    • ENDOSCOPY N/A 12/22/2017    Procedure: ESOPHAGOGASTRODUODENOSCOPY WITH BIOPSIES;  Surgeon: Cleveland Devine MD;  Location: [facility] ENDOSCOPY;  Service:    • ENDOSCOPY N/A 5/17/2021    Procedure: ESOPHAGOGASTRODUODENOSCOPY  with biopsies;  Surgeon: Cleveland Devine MD;  Location: [facility] ENDOSCOPY;  Service: Gastroenterology;  Laterality: N/A;  Pre: Fe deficency anemia, nausea, heme positive stool   Post: gastritis, sloughing of distal esophagus mucosa   •  GALLBLADDER SURGERY     • HEMORRHOIDECTOMY     • HYSTERECTOMY     • KIDNEY SURGERY  04/22/2013    Stent placement   • MAZE PROCEDURE     • MITRAL VALVE REPLACEMENT     • TONSILLECTOMY     • TRICUSPID VALVE REPLACEMENT       Social History:    Quit smoking 2007  Worked as seamstress for mattress [employer]     Family History:  No 1st degree relatives w/ Strep septicemia     Antibiotic allergies and intolerances:    1. Cephalexin - itching many years ago    Medications:    Current Facility-Administered Medications:   •  cefTRIAXone (ROCEPHIN) IVPB 2 g, 2 g, Intravenous, Once, Trish Sloan APRN, Last Rate: 100 mL/hr at 07/02/21 1612, 2 g at 07/02/21 1612  •  magnesium sulfate 2g/50 mL (PREMIX) infusion, 2 g, Intravenous, Once, Kasi Buchanan MD  •  potassium chloride 10 mEq in 100 mL IVPB, 10 mEq, Intravenous, Once **AND** potassium chloride 10 mEq in 100 mL IVPB, 10 mEq, Intravenous, Once **AND** potassium chloride 10 mEq in 100 mL IVPB, 10 mEq, Intravenous, Once **AND** potassium chloride 10 mEq in 100 mL IVPB, 10 mEq, Intravenous, Once **AND** potassium chloride 10 mEq in 100 mL IVPB, 10 mEq, Intravenous, Once **AND** potassium chloride 10 mEq in 100 mL IVPB, 10 mEq, Intravenous, Once **AND** Potassium, , , PRN, Kasi Buchanan MD  •  [COMPLETED] Insert peripheral IV, , , Once **AND** sodium chloride 0.9 % flush 10 mL, 10 mL, Intravenous, PRN, Trish Sloan APRN    Current Outpatient Medications:   •  albuterol (PROVENTIL) (2.5 MG/3ML) 0.083% nebulizer solution, INHALE 1 VIAL BY MOUTH EVERY 4 HOURS AS NEEDED FOR WHEEZING, Disp: 150 mL, Rfl: 3  •  albuterol sulfate  (90 Base) MCG/ACT inhaler, Inhale 2 puffs Every 4 (Four) Hours As Needed for Wheezing., Disp: 18 g, Rfl: 3  •  amLODIPine (NORVASC) 10 MG tablet, TAKE 1 TABLET BY MOUTH EVERY DAY, Disp: 90 tablet, Rfl: 1  •  aspirin 81 MG EC tablet, Take 1 tablet by mouth Daily., Disp: , Rfl:   •  atorvastatin (LIPITOR) 40 MG tablet,  TAKE 1 TABLET BY MOUTH EVERY DAY, Disp: 90 tablet, Rfl: 2  •  benzonatate (TESSALON) 100 MG capsule, TAKE 1 CAPSULE BY MOUTH 2 TIMES A DAY AS NEEDED FOR COUGH, Disp: 180 capsule, Rfl: 1  •  carvedilol (COREG) 6.25 MG tablet, TAKE 1 TABLET BY MOUTH TWICE A DAY WITH MEALS, Disp: 180 tablet, Rfl: 2  •  cyclobenzaprine (FLEXERIL) 10 MG tablet, TAKE 1 TABLET BY MOUTH AT BEDTIME, Disp: 90 tablet, Rfl: 3  •  fluconazole (DIFLUCAN) 150 MG tablet, 1 p.o. as needed yeast day 1, 4, 7, Disp: 3 tablet, Rfl: 0  •  fluticasone-salmeterol (Advair Diskus) 250-50 MCG/DOSE DISKUS, Inhale 1 puff 2 (Two) Times a Day., Disp: 3 each, Rfl: 3  •  furosemide (LASIX) 40 MG tablet, Take 1 tablet by mouth Daily., Disp: 30 tablet, Rfl: 3  •  guaiFENesin (MUCINEX) 600 MG 12 hr tablet, Take 600 mg by mouth 2 (Two) Times a Day., Disp: , Rfl:   •  HYDROcodone-acetaminophen (NORCO) 5-325 MG per tablet, Take 1 tablet by mouth Every 8 (Eight) Hours As Needed for Moderate Pain . Chronic pain medicine for low back pain, Disp: 90 tablet, Rfl: 0  •  loratadine (Claritin) 10 MG tablet, Take 10 mg by mouth 2 (two) times a day., Disp: , Rfl:   •  magnesium oxide (MAG-OX) 400 MG tablet, TAKE 1 TABLET BY MOUTH EVERY DAY, Disp: 90 tablet, Rfl: 1  •  meclizine (ANTIVERT) 25 MG tablet, Take 1 tablet by mouth 3 (Three) Times a Day As Needed for Dizziness. prn, Disp: 30 tablet, Rfl: 3  •  Multiple Vitamins-Minerals (MULTIVITAL) tablet, Take 1 tablet by mouth Daily., Disp: , Rfl:   •  Nebulizer device, 1 Units 4 (Four) Times a Day As Needed (Wheezing). J44.1 j20.8, Disp: 1 each, Rfl: 0  •  O2 (OXYGEN), Inhale 3 L/min Continuous., Disp: , Rfl:   •  Omega-3 Fatty Acids (fish oil) 1000 MG capsule capsule, Take  by mouth Every Night., Disp: , Rfl:   •  omeprazole (priLOSEC) 40 MG capsule, TAKE 1 CAPSULE BY MOUTH EVERY DAY, Disp: 90 capsule, Rfl: 1  •  ondansetron (ZOFRAN) 4 MG tablet, Take 1 tablet by mouth Every 12 (Twelve) Hours As Needed for Nausea or Vomiting., Disp:  90 tablet, Rfl: 3  •  polyethylene glycol (MIRALAX) packet, Take 17 g by mouth 2 (Two) Times a Day. (Patient taking differently: Take 17 g by mouth 2 (Two) Times a Day As Needed.), Disp: , Rfl:   •  potassium chloride 10 MEQ CR tablet, 1 p.o. daily, Disp: 30 tablet, Rfl: 5  •  predniSONE (DELTASONE) 10 MG tablet, Take 4 tabs daily x 3 days, then take 3 tabs daily x 3 days, then take 2 tabs daily x 3 days, then take 1 tab daily x 3 days, Disp: 31 tablet, Rfl: 0  •  roflumilast (DALIRESP) 500 MCG tablet tablet, Take 1 tablet by mouth Daily., Disp: 90 tablet, Rfl: 0  •  rOPINIRole (REQUIP) 2 MG tablet, TAKE 1 TABLET BY MOUTH EVERY DAY EVERY NIGHT, Disp: 90 tablet, Rfl: 2  •  sertraline (ZOLOFT) 100 MG tablet, TAKE 1 TABLET BY MOUTH EVERY DAY, Disp: 90 tablet, Rfl: 1  •  tiotropium (Spiriva HandiHaler) 18 MCG per inhalation capsule, Place 1 capsule into inhaler and inhale Daily., Disp: 90 capsule, Rfl: 3  •  valACYclovir (Valtrex) 1000 MG tablet, 1 p.o. 3 times daily x1 week, Disp: 21 tablet, Rfl: 0  •  zaleplon (SONATA) 10 MG capsule, TAKE 1 CAPSULE BY MOUTH EVERY NIGHT, Disp: 30 capsule, Rfl: 3    Review of Systems  All systems were reviewed and are negative unless otherwise stated above in the HPI    Objective   Vital Signs   Temp:  [96.4 °F (35.8 °C)] 96.4 °F (35.8 °C)  Heart Rate:  [97] 97  Resp:  [20] 20    Physical Exam:   General: awake, alert, very nice  Head: atraumatic  Eyes: no scleral icterus; wears glasses  ENT: dentures; no thrush  Neck: Supple  Cardiovascular: NR, RR, no murmur  Respiratory: B rales; no wheezing; on 2L NC  GI: Abdomen is soft, non-tender, non-distended  :  no Vargas catheter  Musculoskeletal: no joint effusions, normal musculature  Skin: No rashes  Neurological: Alert and oriented x 3  Psychiatric: Normal mood and affect   Vasc: no cyanosis; PIV w/o erythema    Labs:     Lab Results   Component Value Date    WBC 9.69 07/02/2021    HGB 12.1 07/02/2021    HCT 36.9 07/02/2021    MCV 86.8  07/02/2021     07/02/2021       Lab Results   Component Value Date    GLUCOSE 117 (H) 07/02/2021    BUN 13 07/02/2021    CREATININE 0.79 07/02/2021    EGFRIFNONA 72 07/02/2021    BCR 16.5 07/02/2021    CO2 33.8 (H) 07/02/2021    CALCIUM 9.0 07/02/2021    ALBUMIN 4.20 07/02/2021    AST 20 07/02/2021    ALT 18 07/02/2021       Microbiology:  6/14 RPP: + coronavirus NL63  6/14 BCx: Streptococcus gallolyticus ssp pasteurianus (sensitive to penicillin, ceftriaxone, levofloxacin, and vancomycin)  6/16 BCx: negative  7/1 BCx: GPCs in chains    Radiology (personally reviewed images and report):  7/2 CXR with LLL atelectasis vs infiltrate    Radiology (prior):  TTE with good view of the bioprosthetic MV and it was normal     CTA chest negative for PE; + atypical infection    Assessment/Plan   1. Strep gallolyticus septicemia  2. Chronic hypoxic respiratory failure (3L NC at home)  3. Mitral valve replaced (8/2014)  4. Tricuspid valve repair (8/2014)  5. COPD   6. History of coronavirus NL63 and Pseudomonas aeruginosa  7. History of colon polyps    With what appears to be recurrent Strep septicemia, I am concerned about endocarditis given her history of MV replacement and TV repair. I recommend that we treat her with ceftriaxone 2 g IV q24h and ask cardiology for a CHIO. Duration of antibiotics TBD but possibly up to 6 weeks. I will repeat BCx in the AM to document sterility. She may also need a colonoscopy to evaluate for cancerous lesions. The last one that I see was in 2017. I will plan to ask for GI evaluation after the CHIO.     ID will follow. I discussed the case with her  and the ER physician.

## 2021-07-02 NOTE — ED NOTES
"Nursing report ED to floor  Paz Browne  66 y.o.  female    HPI (triage note):   Chief Complaint   Patient presents with   • Abnormal Lab   • Fever       Admitting doctor:   Susanne Bello MD    Admitting diagnosis:   The primary encounter diagnosis was Septicemia (CMS/[facility]). Diagnoses of Bandemia and Hypokalemia were also pertinent to this visit.    Code status:   Current Code Status     Date Active Code Status Order ID Comments User Context       Prior    Advance Care Planning Activity          Allergies:   Bupropion, Cephalexin, and Metoprolol    Weight:       07/02/21  1613   Weight: 59 kg (130 lb)       Most recent vitals:   Vitals:    07/02/21 1349 07/02/21 1613 07/02/21 1700   BP:   152/98   Pulse: 97     Resp: 20     Temp: 96.4 °F (35.8 °C)     TempSrc: Tympanic     SpO2: 97%     Weight:  59 kg (130 lb)    Height: 170.2 cm (67\")         Active LDAs/IV Access:   Lines, Drains & Airways    Active LDAs     Name:   Placement date:   Placement time:   Site:   Days:    Peripheral IV 07/02/21 1611 Left;Posterior Wrist   07/02/21 1611    Wrist   less than 1                Labs (abnormal labs have a star):   Labs Reviewed   COMPREHENSIVE METABOLIC PANEL - Abnormal; Notable for the following components:       Result Value    Glucose 117 (*)     Potassium 2.7 (*)     Chloride 94 (*)     CO2 33.8 (*)     All other components within normal limits    Narrative:     GFR Normal >60  Chronic Kidney Disease <60  Kidney Failure <15     C-REACTIVE PROTEIN - Abnormal; Notable for the following components:    C-Reactive Protein 6.14 (*)     All other components within normal limits   PROCALCITONIN - Abnormal; Notable for the following components:    Procalcitonin 0.28 (*)     All other components within normal limits    Narrative:     As a Marker for Sepsis (Non-Neonates):     1. <0.5 ng/mL represents a low risk of severe sepsis and/or septic shock.  2. >2 ng/mL represents a high risk of severe sepsis and/or septic " "shock.    As a Marker for Lower Respiratory Tract Infections that require antibiotic therapy:  PCT on Admission     Antibiotic Therapy             6-12 Hrs later  >0.5                          Strongly Recommended            >0.25 - <0.5             Recommended  0.1 - 0.25                  Discouraged                       Remeasure/reassess PCT  <0.1                         Strongly Discouraged         Remeasure/reassess PCT      As 28 day mortality risk marker: \"Change in Procalcitonin Result\" (>80% or <=80%) if Day 0 (or Day 1) and Day 4 values are available. Refer to http://[email or web address] [email or web address] It Workpct-calculator.com/    Change in PCT <=80 %   A decrease of PCT levels below or equal to 80% defines a positive change in PCT test result representing a higher risk for 28-day all-cause mortality of patients diagnosed with severe sepsis or septic shock.    Change in PCT >80 %   A decrease of PCT levels of more than 80% defines a negative change in PCT result representing a lower risk for 28-day all-cause mortality of patients diagnosed with severe sepsis or septic shock.              Results may be falsely decreased if patient taking Biotin.    CBC WITH AUTO DIFFERENTIAL - Abnormal; Notable for the following components:    Neutrophil % 84.1 (*)     Lymphocyte % 10.4 (*)     Monocyte % 3.1 (*)     Immature Grans % 0.9 (*)     Neutrophils, Absolute 8.14 (*)     Immature Grans, Absolute 0.09 (*)     All other components within normal limits   SEDIMENTATION RATE - Normal   LACTIC ACID, PLASMA - Normal   MAGNESIUM - Normal   BLOOD CULTURE   BLOOD CULTURE   COVID PRE-OP / PRE-PROCEDURE SCREENING ORDER (NO ISOLATION)    Narrative:     The following orders were created for panel order COVID PRE-OP / PRE-PROCEDURE SCREENING ORDER (NO ISOLATION) - Swab, Nasopharynx.  Procedure                               Abnormality         Status                     ---------                               -----------         ------                   "   COVID-19, [facility] IN-HOUSE...[[id number]]                      In process                   Please view results for these tests on the individual orders.   COVID-19, [facility] IN-HOUSE CEPHEID/RADHA, NP SWAB IN TRANSPORT MEDIA 8-12 HR TAT   CBC AND DIFFERENTIAL    Narrative:     The following orders were created for panel order CBC & Differential.  Procedure                               Abnormality         Status                     ---------                               -----------         ------                     CBC Auto Differential[[id number]]        Abnormal            Final result                 Please view results for these tests on the individual orders.       EKG:   No orders to display       Meds given in ED:   Medications   sodium chloride 0.9 % flush 10 mL (has no administration in time range)   potassium chloride 10 mEq in 100 mL IVPB (has no administration in time range)     And   potassium chloride 10 mEq in 100 mL IVPB (10 mEq Intravenous New Bag 7/2/21 1720)     And   potassium chloride 10 mEq in 100 mL IVPB (has no administration in time range)     And   potassium chloride 10 mEq in 100 mL IVPB (has no administration in time range)     And   potassium chloride 10 mEq in 100 mL IVPB (has no administration in time range)     And   potassium chloride 10 mEq in 100 mL IVPB (has no administration in time range)   magnesium sulfate 2g/50 mL (PREMIX) infusion (2 g Intravenous New Bag 7/2/21 1721)   cefTRIAXone (ROCEPHIN) IVPB 2 g (has no administration in time range)   cefTRIAXone (ROCEPHIN) IVPB 2 g (0 g Intravenous Stopped 7/2/21 1709)   HYDROcodone-acetaminophen (NORCO) 5-325 MG per tablet 1 tablet (1 tablet Oral Given 7/2/21 1720)       Imaging results:  XR Chest 1 View    Result Date: 7/2/2021  Minimal likely atelectasis or infiltrate left lower lung.  This report was finalized on 7/2/2021 3:52 PM by Dr. Chapincito Garcia M.D.        Ambulatory status:   - Assist x1    Social issues:   Social History      Socioeconomic History   • Marital status:      Spouse name: Not on file   • Number of children: 2   • Years of education: Not on file   • Highest education level: Not on file   Tobacco Use   • Smoking status: Former Smoker     Quit date:      Years since quittin.5   • Smokeless tobacco: Never Used   Substance and Sexual Activity   • Alcohol use: No     Comment: caffeine use   • Drug use: No   • Sexual activity: Defer    Nursing report ED to floor       Natacha Bruner RN  21 1770

## 2021-07-02 NOTE — ED PROVIDER NOTES
EMERGENCY DEPARTMENT ENCOUNTER  Patient was placed in face mask in first look and the following protective measures were taken unless additional measures were taken and documented below in the ED course. Patient was wearing facemask when I entered the room and throughout our encounter. I wore full protective equipment throughout this patient encounter including a face mask, and gloves. Hand hygiene was performed before donning protective equipment and after removal when leaving the room.    Room Number:  26/26  Date of encounter:  7/2/2021  PCP: Javan Martinez MD    HPI:  Context: Paz Browne is a 66 y.o. female who presents to the ED c/o chief complaint of positive blood cultures. Patient was recently admitted and treated for bacteremia, reports after completing the antibiotic she felt better. Patient began feeling sick shortly after completing antibiotics with a progressive decline. Patient reports just began to experience shakes and chills last night, fever this morning. Patient has had a chronic cough, productive in nature. Patient has history of COPD is on oxygen all the time, does report because of is worse than normal. Patient complains of chronic shortness of breath. Patient has had nausea with occasional episodes of emesis, no emesis recently. Patient reports decreased appetite, no abdominal pain, no diarrhea or urinary symptoms. Patient denies any rashes.    MEDICAL HISTORY REVIEW  Reviewed in EPIC    PAST MEDICAL HISTORY  Active Ambulatory Problems     Diagnosis Date Noted   • PAF (paroxysmal atrial fibrillation) (CMS/[facility]) 01/25/2016   • Hypertension    • Hyperlipidemia    • Pain medication agreement 05/04/2016   • Hiatal hernia 05/04/2016   • Primary osteoarthritis involving multiple joints 05/04/2016   • Pulmonary hypertension (CMS/[facility])    • S/P TVR (tricuspid valve repair) 07/07/2016   • Pulmonary nodule 10/13/2016   • Hemoptysis 10/14/2016   • RLS (restless legs syndrome) 11/18/2016   •  Chronic low back pain 08/02/2017   • Dysplastic polyp of colon 08/07/2017   • History of mitral valve replacement with tissue graft 08/11/2017   • Chronic respiratory failure with hypoxia (CMS/HCC) 08/13/2017   • Anemia 08/09/2017   • Celiac artery stenosis (CMS/HCC) 08/24/2017   • Intertrigo 08/25/2017   • COPD (chronic obstructive pulmonary disease) (CMS/HCC) 06/30/2019   • Abnormal EKG 06/30/2019   • Shingles 04/08/2020   • Vertigo 10/06/2020   • Muscle spasms of both lower extremities 12/22/2020   • Iron deficiency anemia 03/30/2021   • Adenomatous polyp of colon 03/30/2021   • Dark stools 03/30/2021   • Gastroesophageal reflux disease without esophagitis 03/30/2021   • Anemia 05/10/2021   • COPD exacerbation (CMS/HCC) 06/14/2021     Resolved Ambulatory Problems     Diagnosis Date Noted   • Tachycardia    • Renal failure    • Palpitations    • Mitral regurgitation    • Heart failure (CMS/HCC) 06/08/2016   • Long term current use of anticoagulant 10/13/2016   • Aspiration pneumonia (CMS/HCC) 10/14/2016   • Lower GI bleed 08/09/2017   • Precordial pain 08/11/2017   • Oral candidiasis 08/14/2017   • VAN (acute kidney injury) (CMS/HCC) 08/15/2017   • GI bleed 12/01/2017   • Acute exacerbation of chronic obstructive pulmonary disease (COPD) (CMS/HCC) 01/02/2018   • Pneumonia due to infectious organism 07/18/2018   • Insomnia due to medical condition 02/01/2019   • Acute hyperkalemia 05/10/2021   • Tremor 05/10/2021     Past Medical History:   Diagnosis Date   • Asthma    • Atrial flutter (CMS/[facility])    • Cataract    • CHF (congestive heart failure) (CMS/HCC)    • Colon polyp    • Emphysema of lung (CMS/HCC)    • History of CHF (congestive heart failure)    • History of home oxygen therapy    • Infectious viral hepatitis    • Long term (current) use of anticoagulants    • Pneumonia        PAST SURGICAL HISTORY  Past Surgical History:   Procedure Laterality Date   • CARDIAC CATHETERIZATION  09/01/2014    Right dominant  systemt, normal coronary arteries.    • CARDIAC CATHETERIZATION Left 6/10/2016    Procedure: Cardiac catheterization;  Surgeon: Sergei Hall MD;  Location: [facility] CATH INVASIVE LOCATION;  Service:    • CARDIAC CATHETERIZATION N/A 6/10/2016    Procedure: Right Heart Cath;  Surgeon: Sergei Hall MD;  Location: [facility] CATH INVASIVE LOCATION;  Service:    • CATARACT EXTRACTION     • COLONOSCOPY     • COLONOSCOPY N/A 8/4/2017    Procedure: COLONOSCOPY TO CECUM/TI WITH POLYPECTOMY ( COLD BX);  Surgeon: Cleveland Devine MD;  Location: [facility] ENDOSCOPY;  Service:    • COLONOSCOPY N/A 8/10/2017    Procedure: COLONOSCOPY to cecum and TI with 2 clips placed at transverse;  Surgeon: Earnest PALOMO MD;  Location: [facility] ENDOSCOPY;  Service:    • COLONOSCOPY N/A 12/22/2017    Procedure: COLONOSCOPY INTO CECUM WITH COLD POLYPECTOMIES;  Surgeon: Cleveland Devine MD;  Location: [facility] ENDOSCOPY;  Service:    • COLONOSCOPY N/A 5/17/2021    Procedure: COLONOSCOPY to cecum;  Surgeon: Cleveland Devine MD;  Location: [facility] ENDOSCOPY;  Service: Gastroenterology;  Laterality: N/A;  Pre: Fe deficency anemia, h/x of polyps  Post: fair prep, normal   • ENDOSCOPY N/A 8/17/2017    Procedure: ESOPHAGOGASTRODUODENOSCOPY;  Surgeon: Porsha Ruby MD;  Location: [facility] ENDOSCOPY;  Service:    • ENDOSCOPY N/A 12/22/2017    Procedure: ESOPHAGOGASTRODUODENOSCOPY WITH BIOPSIES;  Surgeon: Cleveland Devine MD;  Location: [facility] ENDOSCOPY;  Service:    • ENDOSCOPY N/A 5/17/2021    Procedure: ESOPHAGOGASTRODUODENOSCOPY  with biopsies;  Surgeon: Cleveland Devine MD;  Location: [facility] ENDOSCOPY;  Service: Gastroenterology;  Laterality: N/A;  Pre: Fe deficency anemia, nausea, heme positive stool   Post: gastritis, sloughing of distal esophagus mucosa   • GALLBLADDER SURGERY     • HEMORRHOIDECTOMY     • HYSTERECTOMY     • KIDNEY SURGERY  04/22/2013    Stent placement   • MAZE PROCEDURE     • MITRAL VALVE REPLACEMENT     • TONSILLECTOMY     • TRICUSPID VALVE REPLACEMENT          FAMILY HISTORY  Family History   Adopted: Yes   Problem Relation Age of Onset   • No Known Problems Mother    • No Known Problems Father        SOCIAL HISTORY  Social History     Socioeconomic History   • Marital status:      Spouse name: Not on file   • Number of children: 2   • Years of education: Not on file   • Highest education level: Not on file   Tobacco Use   • Smoking status: Former Smoker     Quit date:      Years since quittin.5   • Smokeless tobacco: Never Used   Substance and Sexual Activity   • Alcohol use: No     Comment: caffeine use   • Drug use: No   • Sexual activity: Defer       ALLERGIES  Bupropion, Cephalexin, and Metoprolol    The patient's allergies have been reviewed    REVIEW OF SYSTEMS  All systems reviewed and negative except for those discussed in HPI.     PHYSICAL EXAM  I have reviewed the triage vital signs and nursing notes.  ED Triage Vitals [21 1349]   Temp Heart Rate Resp BP SpO2   96.4 °F (35.8 °C) 97 20 -- 97 %      Temp src Heart Rate Source Patient Position BP Location FiO2 (%)   Tympanic Monitor -- -- --       General: No acute distress.  HENT: NCAT, PERRL, Nares patent.  Eyes: no scleral icterus.  Neck: trachea midline, no ROM limitations.  CV: regular rhythm, regular rate.  Respiratory: normal effort, good air movement throughout, trace end expiratory wheeze.  Abdomen: soft, nondistended, NTTP, no rebound tenderness, no guarding or rigidity  : deferred.  Musculoskeletal: no deformity.  Neuro: alert, moves all extremities, follows commands.  Skin: warm, dry.    LAB RESULTS  Recent Results (from the past 24 hour(s))   Comprehensive Metabolic Panel    Collection Time: 21  3:16 PM    Specimen: Blood   Result Value Ref Range    Glucose 117 (H) 65 - 99 mg/dL    BUN 13 8 - 23 mg/dL    Creatinine 0.79 0.57 - 1.00 mg/dL    Sodium 139 136 - 145 mmol/L    Potassium 2.7 (L) 3.5 - 5.2 mmol/L    Chloride 94 (L) 98 - 107 mmol/L    CO2 33.8 (H) 22.0 -  29.0 mmol/L    Calcium 9.0 8.6 - 10.5 mg/dL    Total Protein 6.8 6.0 - 8.5 g/dL    Albumin 4.20 3.50 - 5.20 g/dL    ALT (SGPT) 18 1 - 33 U/L    AST (SGOT) 20 1 - 32 U/L    Alkaline Phosphatase 72 39 - 117 U/L    Total Bilirubin 1.0 0.0 - 1.2 mg/dL    eGFR Non African Amer 72 >60 mL/min/1.73    Globulin 2.6 gm/dL    A/G Ratio 1.6 g/dL    BUN/Creatinine Ratio 16.5 7.0 - 25.0    Anion Gap 11.2 5.0 - 15.0 mmol/L   Sedimentation Rate    Collection Time: 07/02/21  3:16 PM    Specimen: Blood   Result Value Ref Range    Sed Rate 30 0 - 30 mm/hr   C-reactive Protein    Collection Time: 07/02/21  3:16 PM    Specimen: Blood   Result Value Ref Range    C-Reactive Protein 6.14 (H) 0.00 - 0.50 mg/dL   Lactic Acid, Plasma    Collection Time: 07/02/21  3:16 PM    Specimen: Blood   Result Value Ref Range    Lactate 0.9 0.5 - 2.0 mmol/L   Procalcitonin    Collection Time: 07/02/21  3:16 PM    Specimen: Blood   Result Value Ref Range    Procalcitonin 0.28 (H) 0.00 - 0.25 ng/mL   CBC Auto Differential    Collection Time: 07/02/21  3:16 PM    Specimen: Blood   Result Value Ref Range    WBC 9.69 3.40 - 10.80 10*3/mm3    RBC 4.25 3.77 - 5.28 10*6/mm3    Hemoglobin 12.1 12.0 - 15.9 g/dL    Hematocrit 36.9 34.0 - 46.6 %    MCV 86.8 79.0 - 97.0 fL    MCH 28.5 26.6 - 33.0 pg    MCHC 32.8 31.5 - 35.7 g/dL    RDW 15.0 12.3 - 15.4 %    RDW-SD 47.0 37.0 - 54.0 fl    MPV 11.2 6.0 - 12.0 fL    Platelets 147 140 - 450 10*3/mm3    Neutrophil % 84.1 (H) 42.7 - 76.0 %    Lymphocyte % 10.4 (L) 19.6 - 45.3 %    Monocyte % 3.1 (L) 5.0 - 12.0 %    Eosinophil % 0.9 0.3 - 6.2 %    Basophil % 0.6 0.0 - 1.5 %    Immature Grans % 0.9 (H) 0.0 - 0.5 %    Neutrophils, Absolute 8.14 (H) 1.70 - 7.00 10*3/mm3    Lymphocytes, Absolute 1.01 0.70 - 3.10 10*3/mm3    Monocytes, Absolute 0.30 0.10 - 0.90 10*3/mm3    Eosinophils, Absolute 0.09 0.00 - 0.40 10*3/mm3    Basophils, Absolute 0.06 0.00 - 0.20 10*3/mm3    Immature Grans, Absolute 0.09 (H) 0.00 - 0.05 10*3/mm3     nRBC 0.0 0.0 - 0.2 /100 WBC   Magnesium    Collection Time: 07/02/21  3:16 PM    Specimen: Blood   Result Value Ref Range    Magnesium 2.0 1.6 - 2.4 mg/dL       I ordered the above labs and reviewed the results.    RADIOLOGY  XR Chest 1 View    Result Date: 7/2/2021  XR CHEST 1 VW-  HISTORY: Female who is 66 years-old,  infection  TECHNIQUE: Frontal view of the chest  COMPARISON: 06/14/2021  FINDINGS: The patient is rotated towards the right. The heart size is normal. Aorta is calcified. Sternotomy wires are noted. Pulmonary vasculature is unremarkable. Old granulomatous disease is present. Emphysematous changes are present. Minimal likely atelectasis or infiltrate left lower lung. No pleural effusion, or pneumothorax. No acute osseous process.      Minimal likely atelectasis or infiltrate left lower lung.  This report was finalized on 7/2/2021 3:52 PM by Dr. Chapincito Garcia M.D.        I ordered the above noted radiological studies. I reviewed the images and results. I agree with the radiologist interpretation.    PROCEDURES  Procedures    MEDICATIONS GIVEN IN ER  Medications   sodium chloride 0.9 % flush 10 mL (has no administration in time range)   potassium chloride 10 mEq in 100 mL IVPB (has no administration in time range)     And   potassium chloride 10 mEq in 100 mL IVPB (has no administration in time range)     And   potassium chloride 10 mEq in 100 mL IVPB (has no administration in time range)     And   potassium chloride 10 mEq in 100 mL IVPB (has no administration in time range)     And   potassium chloride 10 mEq in 100 mL IVPB (has no administration in time range)     And   potassium chloride 10 mEq in 100 mL IVPB (has no administration in time range)   magnesium sulfate 2g/50 mL (PREMIX) infusion (has no administration in time range)   cefTRIAXone (ROCEPHIN) IVPB 2 g (has no administration in time range)   HYDROcodone-acetaminophen (NORCO) 5-325 MG per tablet 1 tablet (has no administration in time  range)   cefTRIAXone (ROCEPHIN) IVPB 2 g (2 g Intravenous New Bag 7/2/21 1612)       PROGRESS, DATA ANALYSIS, CONSULTS, AND MEDICAL DECISION MAKING  A complete history and physical exam have been performed.  All available laboratory and imaging results have been reviewed by myself prior to disposition.    MDM  After the initial H&P, I discussed pertinent information from history and physical exam with patient/family.  Discussed differential diagnosis.  Discussed plan for ED evaluation/work-up/treatment.  All questions answered.  Patient/family is agreeable with plan.  ED Course as of Jul 02 1706 Fri Jul 02, 2021   1505 Medical history reviewed and significant for: Patient followed by infectious disease, previous blood cultures were positive and patient was treated for ceftriaxone.  Repeat blood cultures obtained by infectious disease and positive for strep species, given history of recent mitral and tricuspid valve repair, concern for possible endocarditis.  Infectious disease recommended patient present to the emergency department for admission, recommended ceftriaxone 2 g IV every 24 hours with ID consult.    [JG]   1506 Medical record reviewed and significant for: Patient was last admitted to the hospital on the 14th of last month, discharged on the 18th.  Patient has history of COPD and chronic hypoxic respiratory failure, present with shortness of breath, found to have lower respiratory tract infection with repeat sputum that grew Pseudomonas.  Patient was found to have strep bacteremia, evaluated by infectious disease and cardiology, echocardiogram showed no sign of vegetations, treated with antibiotics and blood cultures cleared.    [JG]   1600 Patient reassessed.  Discussed ED findings, differential diagnosis, and the need for admission for evaluation/treatment.  They are agreeable to admission and all questions were answered.        [JG]   1621 Phone call with infectious disease on-call.  Discussed the  patient, relevant history, exam, diagnostics, ED findings/progress, and concerns.  He agrees to consult        [JG]   7133 Phone call with Dr. Bello.  Discussed the patient, relevant history, exam, diagnostics, ED findings/progress, and concerns. They agree to admit the patient to telemetry. Care assumed by the admitting physician at this time.        [JG]   1706 Patient requesting home narcotic pain medication, will comply.    [JG]      ED Course User Index  [JG] Kasi Buchanan MD       AS OF 17:06 EDT VITALS:    BP - 152/98  HR - 97  TEMP - 96.4 °F (35.8 °C) (Tympanic)  O2 SATS - 97%    DIAGNOSIS  Final diagnoses:   Septicemia (CMS/HCC)   Bandemia   Hypokalemia         DISPOSITION  ADMISSION    Discussed treatment plan and reason for admission with pt/family and admitting physician.  Pt/family voiced understanding of the plan for admission for further testing/treatment as needed.          Kasi Buchanan MD  07/02/21 4026       Kasi Buchanan MD  07/02/21 0052

## 2021-07-02 NOTE — TELEPHONE ENCOUNTER
Phone with Mrs. Browne. Informed her, per Dr. Clayton, that her most recent blood cultures are positive and she will need to come back to [facility] ER for evaluation. Expect admission for IV ABX and testing to check heart valve involvement. Patient states she began running a fever and having chills last night. She will arrange transportation and come to ER today. Donna Pompa RN

## 2021-07-02 NOTE — PROGRESS NOTES
"Pharmacy to Dose Enoxaparin    Patient: Paz Browne (S420/1, [unfilled])  Relevant clinical data and objective history reviewed:  66 y.o. female 170.2 cm (67\") 59 kg (130 lb)  Body mass index is 20.36 kg/m².  she has a past medical history of VAN (acute kidney injury) (CMS/[facility]), Anemia, Asthma, Atrial flutter (CMS/HCC), Cataract, Celiac artery stenosis (CMS/HCC), CHF (congestive heart failure) (CMS/HCC), Chronic respiratory failure with hypoxia (CMS/HCC), Colon polyp, COPD (chronic obstructive pulmonary disease) (CMS/HCC), Emphysema of lung (CMS/HCC), GI bleed, Hiatal hernia, History of CHF (congestive heart failure), History of home oxygen therapy, History of mitral valve replacement with tissue graft, Hyperlipidemia, Hypertension, Infectious viral hepatitis, Intertrigo, Long term (current) use of anticoagulants, Mitral regurgitation, PAF (paroxysmal atrial fibrillation) (CMS/[facility]), Pneumonia, Pulmonary hypertension (CMS/[facility]), and S/P TVR (tricuspid valve repair) (7/7/2016).    Documented weights    07/02/21 1613   Weight: 59 kg (130 lb)       Indication: VTE ppx    Other Anticoagulation: none    Estimated Creatinine Clearance: 62.7 mL/min (by C-G formula based on SCr of 0.79 mg/dL).  Body mass index is 20.36 kg/m².    Creatinine   Date Value Ref Range Status   07/02/2021 0.79 0.57 - 1.00 mg/dL Final     Platelets   Date Value Ref Range Status   07/02/2021 147 140 - 450 10*3/mm3 Final     Lab Results   Component Value Date    INR 1.09 06/14/2021     Lab Results   Component Value Date    DDIMERQUANT 0.71 (H) 06/14/2021    DDIMERQUANT 0.48 06/30/2019       PLAN:  Will start Lovenox 40 mg SubQ every 24 hr.   CBC/BMP in AM    Bart Corley, PharmD  07/02/21 18:53 EDT    "

## 2021-07-02 NOTE — ED NOTES
"Pt arrived via PV with c/o abnormal lab work. Pt states she had blood work drawn yesterday and was told to come to ER for \"infection in blood\". Pt unable to tell what labs were altered. Pt was recently admitted but unsure of what \"I don't know, I have an infection in my blood\". Pt reports symptoms of headache, body aches, fever,and cough.     Pt placed in mask, this RN in mask.                 Katelyn Aguilar, RN  07/02/21 8981    "

## 2021-07-03 PROBLEM — R78.81 BACTEREMIA: Status: ACTIVE | Noted: 2021-07-03

## 2021-07-03 LAB
ANION GAP SERPL CALCULATED.3IONS-SCNC: 8.1 MMOL/L (ref 5–15)
BACTERIA BLD CULT: ABNORMAL
BUN SERPL-MCNC: 9 MG/DL (ref 8–23)
BUN/CREAT SERPL: 13 (ref 7–25)
CALCIUM SPEC-SCNC: 8.9 MG/DL (ref 8.6–10.5)
CHLORIDE SERPL-SCNC: 97 MMOL/L (ref 98–107)
CO2 SERPL-SCNC: 32.9 MMOL/L (ref 22–29)
CREAT SERPL-MCNC: 0.69 MG/DL (ref 0.57–1)
DEPRECATED RDW RBC AUTO: 46.9 FL (ref 37–54)
ERYTHROCYTE [DISTWIDTH] IN BLOOD BY AUTOMATED COUNT: 14.8 % (ref 12.3–15.4)
GFR SERPL CREATININE-BSD FRML MDRD: 85 ML/MIN/1.73
GLUCOSE SERPL-MCNC: 105 MG/DL (ref 65–99)
HCT VFR BLD AUTO: 34.9 % (ref 34–46.6)
HGB BLD-MCNC: 11.5 G/DL (ref 12–15.9)
MCH RBC QN AUTO: 28.9 PG (ref 26.6–33)
MCHC RBC AUTO-ENTMCNC: 33 G/DL (ref 31.5–35.7)
MCV RBC AUTO: 87.7 FL (ref 79–97)
PLATELET # BLD AUTO: 155 10*3/MM3 (ref 140–450)
PMV BLD AUTO: 11.2 FL (ref 6–12)
POTASSIUM SERPL-SCNC: 3.9 MMOL/L (ref 3.5–5.2)
RBC # BLD AUTO: 3.98 10*6/MM3 (ref 3.77–5.28)
SODIUM SERPL-SCNC: 138 MMOL/L (ref 136–145)
WBC # BLD AUTO: 7.5 10*3/MM3 (ref 3.4–10.8)

## 2021-07-03 PROCEDURE — 93005 ELECTROCARDIOGRAM TRACING: CPT | Performed by: INTERNAL MEDICINE

## 2021-07-03 PROCEDURE — 94640 AIRWAY INHALATION TREATMENT: CPT

## 2021-07-03 PROCEDURE — 25010000002 ENOXAPARIN PER 10 MG: Performed by: INTERNAL MEDICINE

## 2021-07-03 PROCEDURE — 25010000003 CEFTRIAXONE PER 250 MG: Performed by: INTERNAL MEDICINE

## 2021-07-03 PROCEDURE — 87040 BLOOD CULTURE FOR BACTERIA: CPT | Performed by: INTERNAL MEDICINE

## 2021-07-03 PROCEDURE — 99232 SBSQ HOSP IP/OBS MODERATE 35: CPT | Performed by: INTERNAL MEDICINE

## 2021-07-03 PROCEDURE — 99222 1ST HOSP IP/OBS MODERATE 55: CPT | Performed by: INTERNAL MEDICINE

## 2021-07-03 PROCEDURE — 63710000001 ONDANSETRON PER 8 MG: Performed by: INTERNAL MEDICINE

## 2021-07-03 PROCEDURE — 94799 UNLISTED PULMONARY SVC/PX: CPT

## 2021-07-03 PROCEDURE — 36415 COLL VENOUS BLD VENIPUNCTURE: CPT | Performed by: INTERNAL MEDICINE

## 2021-07-03 PROCEDURE — 97161 PT EVAL LOW COMPLEX 20 MIN: CPT

## 2021-07-03 PROCEDURE — 85027 COMPLETE CBC AUTOMATED: CPT | Performed by: INTERNAL MEDICINE

## 2021-07-03 PROCEDURE — 80048 BASIC METABOLIC PNL TOTAL CA: CPT | Performed by: INTERNAL MEDICINE

## 2021-07-03 RX ORDER — POLYETHYLENE GLYCOL 3350 17 G/17G
17 POWDER, FOR SOLUTION ORAL DAILY
Status: DISCONTINUED | OUTPATIENT
Start: 2021-07-03 | End: 2021-07-07 | Stop reason: HOSPADM

## 2021-07-03 RX ADMIN — SERTRALINE 100 MG: 100 TABLET, FILM COATED ORAL at 08:23

## 2021-07-03 RX ADMIN — CARVEDILOL 6.25 MG: 6.25 TABLET, FILM COATED ORAL at 17:16

## 2021-07-03 RX ADMIN — CEFTRIAXONE SODIUM 2 G: 2 INJECTION, SOLUTION INTRAVENOUS at 13:24

## 2021-07-03 RX ADMIN — ROFLUMILAST 500 MCG: 500 TABLET ORAL at 08:23

## 2021-07-03 RX ADMIN — AMLODIPINE BESYLATE 10 MG: 10 TABLET ORAL at 08:23

## 2021-07-03 RX ADMIN — POTASSIUM CHLORIDE 20 MEQ: 750 TABLET, EXTENDED RELEASE ORAL at 17:16

## 2021-07-03 RX ADMIN — CARVEDILOL 6.25 MG: 6.25 TABLET, FILM COATED ORAL at 08:22

## 2021-07-03 RX ADMIN — BUDESONIDE AND FORMOTEROL FUMARATE DIHYDRATE 2 PUFF: 160; 4.5 AEROSOL RESPIRATORY (INHALATION) at 06:33

## 2021-07-03 RX ADMIN — ROPINIROLE 2 MG: 2 TABLET, FILM COATED ORAL at 20:33

## 2021-07-03 RX ADMIN — FUROSEMIDE 40 MG: 40 TABLET ORAL at 20:33

## 2021-07-03 RX ADMIN — MAGNESIUM OXIDE 400 MG (241.3 MG MAGNESIUM) TABLET 400 MG: TABLET at 08:23

## 2021-07-03 RX ADMIN — GUAIFENESIN 600 MG: 600 TABLET, EXTENDED RELEASE ORAL at 08:23

## 2021-07-03 RX ADMIN — CYCLOBENZAPRINE 10 MG: 10 TABLET, FILM COATED ORAL at 20:33

## 2021-07-03 RX ADMIN — CETIRIZINE HYDROCHLORIDE ALLERGY 10 MG: 10 TABLET ORAL at 08:23

## 2021-07-03 RX ADMIN — POTASSIUM CHLORIDE 20 MEQ: 750 TABLET, EXTENDED RELEASE ORAL at 00:31

## 2021-07-03 RX ADMIN — FUROSEMIDE 40 MG: 40 TABLET ORAL at 08:23

## 2021-07-03 RX ADMIN — POTASSIUM CHLORIDE 20 MEQ: 750 TABLET, EXTENDED RELEASE ORAL at 08:22

## 2021-07-03 RX ADMIN — HYDROCODONE BITARTRATE AND ACETAMINOPHEN 1 TABLET: 5; 325 TABLET ORAL at 02:25

## 2021-07-03 RX ADMIN — Medication 3 MG: at 20:33

## 2021-07-03 RX ADMIN — PANTOPRAZOLE SODIUM 40 MG: 40 TABLET, DELAYED RELEASE ORAL at 06:21

## 2021-07-03 RX ADMIN — POTASSIUM CHLORIDE 40 MEQ: 750 TABLET, EXTENDED RELEASE ORAL at 00:31

## 2021-07-03 RX ADMIN — BUDESONIDE AND FORMOTEROL FUMARATE DIHYDRATE 2 PUFF: 160; 4.5 AEROSOL RESPIRATORY (INHALATION) at 19:42

## 2021-07-03 RX ADMIN — ENOXAPARIN SODIUM 40 MG: 40 INJECTION SUBCUTANEOUS at 20:33

## 2021-07-03 RX ADMIN — ATORVASTATIN CALCIUM 40 MG: 20 TABLET, FILM COATED ORAL at 08:24

## 2021-07-03 RX ADMIN — HYDROCODONE BITARTRATE AND ACETAMINOPHEN 1 TABLET: 5; 325 TABLET ORAL at 20:33

## 2021-07-03 RX ADMIN — ALBUTEROL SULFATE 2.5 MG: 2.5 SOLUTION RESPIRATORY (INHALATION) at 10:16

## 2021-07-03 RX ADMIN — POLYETHYLENE GLYCOL 3350 17 G: 17 POWDER, FOR SOLUTION ORAL at 13:23

## 2021-07-03 RX ADMIN — ASPIRIN 81 MG: 81 TABLET, COATED ORAL at 08:22

## 2021-07-03 RX ADMIN — ONDANSETRON HYDROCHLORIDE 4 MG: 4 TABLET, FILM COATED ORAL at 17:16

## 2021-07-03 RX ADMIN — GUAIFENESIN 600 MG: 600 TABLET, EXTENDED RELEASE ORAL at 20:33

## 2021-07-03 RX ADMIN — HYDROCODONE BITARTRATE AND ACETAMINOPHEN 1 TABLET: 5; 325 TABLET ORAL at 13:23

## 2021-07-03 RX ADMIN — ALBUTEROL SULFATE 2.5 MG: 2.5 SOLUTION RESPIRATORY (INHALATION) at 02:13

## 2021-07-03 RX ADMIN — TIOTROPIUM BROMIDE INHALATION SPRAY 2 PUFF: 3.12 SPRAY, METERED RESPIRATORY (INHALATION) at 06:33

## 2021-07-03 NOTE — THERAPY EVALUATION
Patient Name: Paz Browne  : 1955    MRN: [id number]                              Today's Date: 7/3/2021       Admit Date: 2021    Visit Dx:     ICD-10-CM ICD-9-CM   1. Septicemia (CMS/HCC)  A41.9 038.9   2. Bandemia  D72.825 288.66   3. Hypokalemia  E87.6 276.8     Patient Active Problem List   Diagnosis   • PAF (paroxysmal atrial fibrillation) (CMS/HCC)   • Hypertension   • Hyperlipidemia   • Pain medication agreement   • Hiatal hernia   • Primary osteoarthritis involving multiple joints   • Pulmonary hypertension (CMS/HCC)   • S/P TVR (tricuspid valve repair)   • Pulmonary nodule   • Hemoptysis   • RLS (restless legs syndrome)   • Chronic low back pain   • Dysplastic polyp of colon   • History of mitral valve replacement with tissue graft   • Chronic respiratory failure with hypoxia (CMS/HCC)   • Anemia   • Celiac artery stenosis (CMS/HCC)   • Intertrigo   • COPD (chronic obstructive pulmonary disease) (CMS/HCC)   • Abnormal EKG   • Shingles   • Vertigo   • Muscle spasms of both lower extremities   • Iron deficiency anemia   • Adenomatous polyp of colon   • Dark stools   • Gastroesophageal reflux disease without esophagitis   • Anemia   • COPD exacerbation (CMS/HCC)   • Septicemia (CMS/HCC)     Past Medical History:   Diagnosis Date   • VAN (acute kidney injury) (CMS/HCC)    • Anemia    • Asthma    • Atrial flutter (CMS/HCC)     cardioversion   • Cataract    • Celiac artery stenosis (CMS/HCC)    • CHF (congestive heart failure) (CMS/HCC)    • Chronic respiratory failure with hypoxia (CMS/HCC)    • Colon polyp    • COPD (chronic obstructive pulmonary disease) (CMS/HCC)    • Emphysema of lung (CMS/HCC)    • GI bleed    • Hiatal hernia    • History of CHF (congestive heart failure)     due to MR   • History of home oxygen therapy     3 lpm NC   • History of mitral valve replacement with tissue graft    • Hyperlipidemia    • Hypertension    • Infectious viral hepatitis     B   • Intertrigo    • Long  term (current) use of anticoagulants    • Mitral regurgitation     s/p tissue MVR   • PAF (paroxysmal atrial fibrillation) (CMS/HCC)     s/p MAZE   • Pneumonia    • Pulmonary hypertension (CMS/HCC)    • S/P TVR (tricuspid valve repair) 7/7/2016     Past Surgical History:   Procedure Laterality Date   • CARDIAC CATHETERIZATION  09/01/2014    Right dominant systemt, normal coronary arteries.    • CARDIAC CATHETERIZATION Left 6/10/2016    Procedure: Cardiac catheterization;  Surgeon: Sergei Hall MD;  Location: [facility] CATH INVASIVE LOCATION;  Service:    • CARDIAC CATHETERIZATION N/A 6/10/2016    Procedure: Right Heart Cath;  Surgeon: Sergei Hall MD;  Location: [facility] CATH INVASIVE LOCATION;  Service:    • CATARACT EXTRACTION     • COLONOSCOPY     • COLONOSCOPY N/A 8/4/2017    Procedure: COLONOSCOPY TO CECUM/TI WITH POLYPECTOMY ( COLD BX);  Surgeon: Cleveland Devine MD;  Location: [facility] ENDOSCOPY;  Service:    • COLONOSCOPY N/A 8/10/2017    Procedure: COLONOSCOPY to cecum and TI with 2 clips placed at transverse;  Surgeon: Earnest PALOMO MD;  Location: [facility] ENDOSCOPY;  Service:    • COLONOSCOPY N/A 12/22/2017    Procedure: COLONOSCOPY INTO CECUM WITH COLD POLYPECTOMIES;  Surgeon: Cleveland Devine MD;  Location: [facility] ENDOSCOPY;  Service:    • COLONOSCOPY N/A 5/17/2021    Procedure: COLONOSCOPY to cecum;  Surgeon: Cleveland Devine MD;  Location: [facility] ENDOSCOPY;  Service: Gastroenterology;  Laterality: N/A;  Pre: Fe deficency anemia, h/x of polyps  Post: fair prep, normal   • ENDOSCOPY N/A 8/17/2017    Procedure: ESOPHAGOGASTRODUODENOSCOPY;  Surgeon: Porsha Ruby MD;  Location: [facility] ENDOSCOPY;  Service:    • ENDOSCOPY N/A 12/22/2017    Procedure: ESOPHAGOGASTRODUODENOSCOPY WITH BIOPSIES;  Surgeon: Cleveland Devine MD;  Location: [facility] ENDOSCOPY;  Service:    • ENDOSCOPY N/A 5/17/2021    Procedure: ESOPHAGOGASTRODUODENOSCOPY  with biopsies;  Surgeon: Cleveland Devine MD;  Location: [facility] ENDOSCOPY;  Service:  Gastroenterology;  Laterality: N/A;  Pre: Fe deficency anemia, nausea, heme positive stool   Post: gastritis, sloughing of distal esophagus mucosa   • GALLBLADDER SURGERY     • HEMORRHOIDECTOMY     • HYSTERECTOMY     • KIDNEY SURGERY  04/22/2013    Stent placement   • MAZE PROCEDURE     • MITRAL VALVE REPLACEMENT     • TONSILLECTOMY     • TRICUSPID VALVE REPLACEMENT       General Information     Row Name 07/03/21 1040          Physical Therapy Time and Intention    Document Type  evaluation  -SV     Mode of Treatment  individual therapy;physical therapy  -SV     Row Name 07/03/21 1040          General Information    Patient Profile Reviewed  yes  -SV     Prior Level of Function  independent: household distances  no AD  -SV     Existing Precautions/Restrictions  hip;oxygen therapy device and L/min  -SV     Barriers to Rehab  none identified  -SV     Row Name 07/03/21 1040          Living Environment    Lives With  spouse;sibling(s)  -     Row Name 07/03/21 1040          Cognition    Orientation Status (Cognition)  oriented x 4  -SV     Row Name 07/03/21 1040          Safety Issues, Functional Mobility    Impairments Affecting Function (Mobility)  balance;endurance/activity tolerance;shortness of breath  -SV       User Key  (r) = Recorded By, (t) = Taken By, (c) = Cosigned By    Initials Name Provider Type    SV Amrita Donnelly, PT Physical Therapist        Mobility     Row Name 07/03/21 1138          Bed Mobility    Bed Mobility  supine-sit-supine  -SV     Supine-Sit-Supine [place] (Bed Mobility)  supervision  -SV     Assistive Device (Bed Mobility)  bed rails;head of bed elevated  -SV     Row Name 07/03/21 1138          Sit-Stand Transfer    Sit-Stand [place] (Transfers)  contact guard  -SV     Row Name 07/03/21 1138          Gait/Stairs (Locomotion)    [place] Level (Gait)  contact guard  -SV     Distance in Feet (Gait)  20' with desat on 1 L  to 88%, seated rest with education on PLB , amb  another 40' with O2 at 93% post amb, slow guarded gait  -SV       User Key  (r) = Recorded By, (t) = Taken By, (c) = Cosigned By    Initials Name Provider Type    Amrita Pineda, PT Physical Therapist        Obj/Interventions     Row Name 07/03/21 1141          Range of Motion Comprehensive    General Range of Motion  no range of motion deficits identified  -SV     Row Name 07/03/21 1141          Strength Comprehensive (MMT)    Comment, General Manual Muscle Testing (MMT) Assessment  grossly obs >3/5 BUe/BLE today, no MMT due to endurance issues  -[facility] Name 07/03/21 1141          Balance    Comment, Balance  sit bal sup, standing bal static sba  -[facility] Name 07/03/21 1141          Sensory Assessment (Somatosensory)    Sensory Assessment (Somatosensory)  not tested  -       User Key  (r) = Recorded By, (t) = Taken By, (c) = Cosigned By    Initials Name Provider Type    Amrita Pineda, PT Physical Therapist        Goals/Plan     Row Name 07/03/21 1143          Transfer Goal 1 (PT)    Activity/Assistive Device (Transfer Goal 1, PT)  sit-to-stand/stand-to-sit  -SV     [place] Level/Cues Needed (Transfer Goal 1, PT)  independent  -SV     Time Frame (Transfer Goal 1, PT)  10 days  -SV     Row Name 07/03/21 1143          Gait Training Goal 1 (PT)    Activity/Assistive Device (Gait Training Goal 1, PT)  gait (walking locomotion)  -SV     [place] Level (Gait Training Goal 1, PT)  independent  -SV     Distance (Gait Training Goal 1, PT)  150' least vs no AD  -SV     Time Frame (Gait Training Goal 1, PT)  10 days  -SV       User Key  (r) = Recorded By, (t) = Taken By, (c) = Cosigned By    Initials Name Provider Type    Amrita Pineda, PT Physical Therapist        Clinical Impression     [facility] Name 07/03/21 1142          Pain    Additional Documentation  Pain Scale: Numbers Pre/Post-Treatment (Group)  -SV     Row Name 07/03/21 1142          Pain Scale: Numbers Pre/Post-Treatment     Pretreatment Pain Rating  0/10 - no pain  -SV     Posttreatment Pain Rating  0/10 - no pain  -SV     Row Name 07/03/21 1142          Plan of Care Review    Plan of Care Reviewed With  patient  -SV     Row Name 07/03/21 1142          Therapy Assessment/Plan (PT)    Patient/Family Therapy Goals Statement (PT)  indep amb household distances  no AD  -SV     Rehab Potential (PT)  good, to achieve stated therapy goals  -SV     Criteria for Skilled Interventions Met (PT)  yes  -SV     Predicted Duration of Therapy Intervention (PT)  1-2 weeks  -SV     Row Name 07/03/21 1142          Vital Signs    Pre SpO2 (%)  95  -SV     O2 Delivery Pre Treatment  supplemental O2  -SV     Intra SpO2 (%)  88  -SV     O2 Delivery Intra Treatment  supplemental O2  -SV     Post SpO2 (%)  93  -SV     O2 Delivery Post Treatment  supplemental O2  -SV     Pre Patient Position  Supine  -SV     Intra Patient Position  Standing  -SV     Post Patient Position  Supine  -SV     Row Name 07/03/21 1142          Positioning and Restraints    Pre-Treatment Position  in bed  -SV     Post Treatment Position  bed  -SV     In Bed  call light within reach;encouraged to call for assist;exit alarm on;fowlers  -SV       User Key  (r) = Recorded By, (t) = Taken By, (c) = Cosigned By    Initials Name Provider Type    SV Amrita Donnelly, PT Physical Therapist        Outcome Measures     Row Name 07/03/21 1144          How much help from another person do you currently need...    Turning from your back to your side while in flat bed without using bedrails?  4  -SV     Moving from lying on back to sitting on the side of a flat bed without bedrails?  4  -SV     Moving to and from a bed to a chair (including a wheelchair)?  4  -SV     Standing up from a chair using your arms (e.g., wheelchair, bedside chair)?  4  -SV     Climbing 3-5 steps with a railing?  3  -SV     To walk in hospital room?  3  -SV     AM-PAC 6 Clicks Score (PT)  22  -SV     Row Name 07/03/21 1143           Functional Assessment    Outcome Measure Options  AM-PAC 6 Clicks Basic Mobility (PT)  -       User Key  (r) = Recorded By, (t) = Taken By, (c) = Cosigned By    Initials Name Provider Type    SV Amirta Donnelly, PT Physical Therapist        Physical Therapy Education                 Title: PT OT SLP Therapies (Done)     Topic: Physical Therapy (Done)     Point: Mobility training (Done)     Learning Progress Summary           Patient Acceptance, E,TB, VU,NR by  at 7/3/2021 1145                   Point: Home exercise program (Done)     Learning Progress Summary           Patient Acceptance, E,TB, VU,NR by  at 7/3/2021 1145                               User Key     Initials Effective Dates Name Provider Type Discipline     06/16/21 -  Amrita Donnelly, PT Physical Therapist PT              PT Recommendation and Plan  Planned Therapy Interventions (PT): balance training, strengthening, stretching, patient/family education, gait training, home exercise program, transfer training  Plan of Care Reviewed With: patient     Time Calculation:   PT Charges     Row Name 07/03/21 1149             Time Calculation    Start Time  1040  -      Stop Time  1051  -      Time Calculation (min)  11 min  -      PT Received On  07/03/21  -      PT - Next Appointment  07/04/21  -      PT Goal Re-Cert Due Date  07/13/21  -        User Key  (r) = Recorded By, (t) = Taken By, (c) = Cosigned By    Initials Name Provider Type    SV Amrita Donnelly, PT Physical Therapist        Therapy Charges for Today     Code Description Service Date Service Provider Modifiers Qty    [id number] HC PT EVAL LOW COMPLEXITY 1 7/3/2021 Amrita Donnelly, PT GP 1          PT G-Codes  Outcome Measure Options: AM-PAC 6 Clicks Basic Mobility (PT)  AM-PAC 6 Clicks Score (PT): 22    Amrita Donnelly PT  7/3/2021

## 2021-07-03 NOTE — CONSULTS
Date of Hospital Visit: 21  Encounter Provider: Rodolfo Null MD  Place of Service: [facility] CARDIOLOGY  Patient Name: Paz Browne  :1955  Referral Provider: Susanne Bello, *    Chief complaint    History of Present Illness    Ms. Browne is a 65 yo woman who previously followed with Dr Gan and now follows with Dr Trsih Randall.    It does not 14, she was diagnosed with acute diastolic heart failure due to valvular heart disease and atrial fibrillation.  She was found to have significant mitral and tricuspid regurgitation and underwent tissue mitral valve replacement, tricuspid valve repair, right and left cryomaze, and closure of the left atrial appendage.  Postoperatively, she developed recurrent atrial fibrillation and was started on warfarin.  She was placed on amiodarone and was cardioverted.    In , she presented with shortness of breath and diffuse T wave inversions.  Coronary angiography again showed normal coronary arteries.  She was found to have severe pulmonary hypertension with normal wedge.  She was started on oxygen at that time.    In , she developed GI bleeding, and warfarin was stopped.    In May of this year, she underwent colonoscopy and EGD.  The lower third of the esophagus was abnormal, with a whitish sloughing mucosa.  The pathology showed mild esophagitis.  There were no varices.  Her colonoscopy was a fair prep, and showed some diverticuli.    She was admitted last month with non-COVID-19 coronavirus and had a 101 degree fever.  She was found to have bacteremia.  A transthoracic echocardiogram did not show any vegetations.    She now presents again with recurrent strep bacteremia.  She has chronic dyspnea, and is at baseline.  She denies leg swelling, orthopnea, chest pain, palpitations, or lightheadedness.        She was seen in the office on 2021 for follow up. She has chronic chest pain and occasional  palpitations.  She was needing and EGD and colonoscopy for heme postive stools. She was cleared fro scopes and was recommended to hold ASA 3-5 days prior to procedure.      She presented to the ED yesterday with SOA with exertion and chills. She had been seen by pulmonary last week. She had fever of 101 on arrival. She was found to have coronavirus but not COVID-19 with elevated BNP and was admitted by pulmonary.        The patient reports elevated blood pressures at home especially when she ambulates and her medications have recently been adjusted. She has been given lasix 40 mg iV and is scheduled BID.  She has been restarted on her home amlodipine and Coreg.  Her B/P this AM is 120/55         Past Medical History:   Diagnosis Date   • VAN (acute kidney injury) (CMS/[facility])    • Anemia    • Asthma    • Atrial flutter (CMS/HCC)     cardioversion   • Cataract    • Celiac artery stenosis (CMS/HCC)    • Chronic respiratory failure with hypoxia (CMS/[facility])    • Colon polyp    • COPD (chronic obstructive pulmonary disease) (CMS/[facility])    • Emphysema of lung (CMS/[facility])    • GI bleed    • Hiatal hernia    • History of CHF (congestive heart failure)     due to MR   • History of home oxygen therapy     3 lpm NC   • History of mitral valve replacement with tissue graft    • Hyperlipidemia    • Hypertension    • Infectious viral hepatitis     B   • Intertrigo    • Long term (current) use of anticoagulants    • Mitral regurgitation     s/p tissue MVR   • PAF (paroxysmal atrial fibrillation) (CMS/[facility])     s/p MAZE   • Pneumonia    • Pulmonary hypertension (CMS/[facility])    • S/P TVR (tricuspid valve repair) 7/7/2016       Past Surgical History:   Procedure Laterality Date   • CARDIAC CATHETERIZATION  09/01/2014    Right dominant systemt, normal coronary arteries.    • CARDIAC CATHETERIZATION Left 6/10/2016    Procedure: Cardiac catheterization;  Surgeon: Sergei Hall MD;  Location: [facility] CATH INVASIVE LOCATION;  Service:    • CARDIAC  CATHETERIZATION N/A 6/10/2016    Procedure: Right Heart Cath;  Surgeon: Sergei Hall MD;  Location: [facility] CATH INVASIVE LOCATION;  Service:    • CATARACT EXTRACTION     • COLONOSCOPY     • COLONOSCOPY N/A 8/4/2017    Procedure: COLONOSCOPY TO CECUM/TI WITH POLYPECTOMY ( COLD BX);  Surgeon: Cleveland Devine MD;  Location: [facility] ENDOSCOPY;  Service:    • COLONOSCOPY N/A 8/10/2017    Procedure: COLONOSCOPY to cecum and TI with 2 clips placed at transverse;  Surgeon: Earnest PALOMO MD;  Location: [facility] ENDOSCOPY;  Service:    • COLONOSCOPY N/A 12/22/2017    Procedure: COLONOSCOPY INTO CECUM WITH COLD POLYPECTOMIES;  Surgeon: Cleveland Devine MD;  Location: [facility] ENDOSCOPY;  Service:    • COLONOSCOPY N/A 5/17/2021    Procedure: COLONOSCOPY to cecum;  Surgeon: Cleveland Devine MD;  Location: [facility] ENDOSCOPY;  Service: Gastroenterology;  Laterality: N/A;  Pre: Fe deficency anemia, h/x of polyps  Post: fair prep, normal   • ENDOSCOPY N/A 8/17/2017    Procedure: ESOPHAGOGASTRODUODENOSCOPY;  Surgeon: Porsha Ruby MD;  Location: [facility] ENDOSCOPY;  Service:    • ENDOSCOPY N/A 12/22/2017    Procedure: ESOPHAGOGASTRODUODENOSCOPY WITH BIOPSIES;  Surgeon: Cleveland Devine MD;  Location: [facility] ENDOSCOPY;  Service:    • ENDOSCOPY N/A 5/17/2021    Procedure: ESOPHAGOGASTRODUODENOSCOPY  with biopsies;  Surgeon: Cleveland Devine MD;  Location: [facility] ENDOSCOPY;  Service: Gastroenterology;  Laterality: N/A;  Pre: Fe deficency anemia, nausea, heme positive stool   Post: gastritis, sloughing of distal esophagus mucosa   • GALLBLADDER SURGERY     • HEMORRHOIDECTOMY     • HYSTERECTOMY     • KIDNEY SURGERY  04/22/2013    Stent placement   • MAZE PROCEDURE     • MITRAL VALVE REPLACEMENT     • TONSILLECTOMY     • TRICUSPID VALVE REPLACEMENT         Prior to Admission medications    Medication Sig Start Date End Date Taking? Authorizing Provider   albuterol (PROVENTIL) (2.5 MG/3ML) 0.083% nebulizer solution INHALE 1 VIAL BY MOUTH EVERY 4 HOURS AS  NEEDED FOR WHEEZING  Patient taking differently: Take 2.5 mg by nebulization Every 6 (Six) Hours As Needed for Wheezing (uses 2 to 3 times per day). 4/19/21  Yes Javan Martinez MD   albuterol sulfate  (90 Base) MCG/ACT inhaler Inhale 2 puffs Every 4 (Four) Hours As Needed for Wheezing. 12/1/20  Yes Javan Martinez MD   amLODIPine (NORVASC) 10 MG tablet TAKE 1 TABLET BY MOUTH EVERY DAY  Patient taking differently: Take 10 mg by mouth Daily. 6/9/21  Yes Javan Martinez MD   atorvastatin (LIPITOR) 40 MG tablet TAKE 1 TABLET BY MOUTH EVERY DAY  Patient taking differently: Take 40 mg by mouth Daily. 5/24/21  Yes Javna Martinez MD   carvedilol (COREG) 6.25 MG tablet TAKE 1 TABLET BY MOUTH TWICE A DAY WITH MEALS  Patient taking differently: Take 6.25 mg by mouth 2 (Two) Times a Day With Meals. 12/17/20  Yes Melanie Sykes APRN   cyclobenzaprine (FLEXERIL) 10 MG tablet TAKE 1 TABLET BY MOUTH AT BEDTIME  Patient taking differently: Take 10 mg by mouth every night at bedtime. 12/28/20  Yes Javan Martinez MD   fluticasone-salmeterol (Advair Diskus) 250-50 MCG/DOSE DISKUS Inhale 1 puff 2 (Two) Times a Day. 12/1/20  Yes Javan Martinez MD   furosemide (LASIX) 40 MG tablet Take 1 tablet by mouth Daily.  Patient taking differently: Take 40 mg by mouth 2 (Two) Times a Day. 5/12/21  Yes Ovi Chavez MD   HYDROcodone-acetaminophen (NORCO) 5-325 MG per tablet Take 1 tablet by mouth Every 8 (Eight) Hours As Needed for Moderate Pain . Chronic pain medicine for low back pain 6/18/21  Yes Javan Martinez MD   omeprazole (priLOSEC) 40 MG capsule TAKE 1 CAPSULE BY MOUTH EVERY DAY  Patient taking differently: Take 40 mg by mouth Daily. 4/27/21  Yes Javan Martinez MD   potassium chloride 10 MEQ CR tablet 1 p.o. daily  Patient taking differently: Take 10 mEq by mouth Daily. 1 p.o. daily 5/18/21  Yes Javan Martinez MD   rOPINIRole (REQUIP) 2 MG tablet TAKE 1 TABLET BY MOUTH EVERY DAY EVERY NIGHT  Patient  taking differently: Take 2 mg by mouth Every Night. 3/8/21  Yes Javan Martinez MD   sertraline (ZOLOFT) 100 MG tablet TAKE 1 TABLET BY MOUTH EVERY DAY  Patient taking differently: Take 100 mg by mouth Daily. 12/14/20  Yes Javan Martinez MD   tiotropium (Spiriva HandiHaler) 18 MCG per inhalation capsule Place 1 capsule into inhaler and inhale Daily. 12/1/20  Yes Javan Martinez MD   zaleplon (SONATA) 10 MG capsule TAKE 1 CAPSULE BY MOUTH EVERY NIGHT  Patient taking differently: Take 10 mg by mouth Every Night. 6/14/21  Yes Javan Martinez MD   aspirin 81 MG EC tablet Take 1 tablet by mouth Daily. 8/25/17   Dominique Ponce MD   benzonatate (TESSALON) 100 MG capsule TAKE 1 CAPSULE BY MOUTH 2 TIMES A DAY AS NEEDED FOR COUGH 6/7/21   Javan Martinze MD   guaiFENesin (MUCINEX) 600 MG 12 hr tablet Take 600 mg by mouth 2 (Two) Times a Day.    Paresh Olivera MD   loratadine (Claritin) 10 MG tablet Take 10 mg by mouth 2 (two) times a day.    Paresh Olivera MD   magnesium oxide (MAG-OX) 400 MG tablet TAKE 1 TABLET BY MOUTH EVERY DAY 3/23/21   Javan Martinez MD   meclizine (ANTIVERT) 25 MG tablet Take 1 tablet by mouth 3 (Three) Times a Day As Needed for Dizziness. prn 10/6/20   Javan Martinez MD   Multiple Vitamins-Minerals (MULTIVITAL) tablet Take 1 tablet by mouth Daily.    Paresh Olivera MD   Nebulizer device 1 Units 4 (Four) Times a Day As Needed (Wheezing). J44.1 j20.8 9/23/20   Javan Martinez MD   O2 (OXYGEN) Inhale 3 L/min Continuous.    Paresh Olivera MD   Omega-3 Fatty Acids (fish oil) 1000 MG capsule capsule Take  by mouth Every Night.    Paresh Olivera MD   ondansetron (ZOFRAN) 4 MG tablet Take 1 tablet by mouth Every 12 (Twelve) Hours As Needed for Nausea or Vomiting. 5/12/21   Ovi Chavez MD   polyethylene glycol (MIRALAX) packet Take 17 g by mouth 2 (Two) Times a Day.  Patient taking differently: Take 17 g by mouth 2 (Two) Times a Day As Needed.  "17   Dominique Ponce MD   predniSONE (DELTASONE) 10 MG tablet Take 4 tabs daily x 3 days, then take 3 tabs daily x 3 days, then take 2 tabs daily x 3 days, then take 1 tab daily x 3 days 21   Bubba Prince MD   roflumilast (DALIRESP) 500 MCG tablet tablet Take 1 tablet by mouth Daily. 21   Amanda Beckham APRN   valACYclovir (Valtrex) 1000 MG tablet 1 p.o. 3 times daily x1 week 3/30/20   Black Campbell MD       Social History     Socioeconomic History   • Marital status:      Spouse name: Not on file   • Number of children: 2   • Years of education: Not on file   • Highest education level: Not on file   Tobacco Use   • Smoking status: Former Smoker     Quit date:      Years since quittin.5   • Smokeless tobacco: Never Used   Vaping Use   • Vaping Use: Never used   Substance and Sexual Activity   • Alcohol use: No     Comment: caffeine use   • Drug use: No   • Sexual activity: Defer       Family History   Adopted: Yes   Problem Relation Age of Onset   • No Known Problems Mother    • No Known Problems Father        Review of Systems   Constitutional: Positive for chills, fatigue and fever.   All other systems reviewed and are negative.       Objective:     Vitals:    21 1238 21 1327 21 1334 21 1509   BP:   138/63    BP Location:   Right arm    Patient Position:   Sitting    Pulse: 75 73 67 83   Resp:   18 18   Temp:   98.1 °F (36.7 °C)    TempSrc:   Oral    SpO2: 97% 98%     Weight:       Height:         Body mass index is 20.61 kg/m².    Last Weight and Admission Weight        21  0650   Weight: 59.7 kg (131 lb 9.6 oz)     Flowsheet Rows      First Filed Value   Admission Height  170.2 cm (67\") Documented at 2021 1349   Admission Weight  59 kg (130 lb) Documented at 2021 1613            Intake/Output Summary (Last 24 hours) at 7/3/2021 1509  Last data filed at 7/3/2021 1509  Gross per 24 hour   Intake 1465 ml   Output 700 ml   Net 765 "          Physical Exam  Vitals reviewed.   Constitutional:       Appearance: She is well-developed.   HENT:      Head: Normocephalic.      Nose: Nose normal.   Eyes:      Conjunctiva/sclera: Conjunctivae normal.   Neck:      Vascular: No JVD.   Cardiovascular:      Rate and Rhythm: Normal rate and regular rhythm.      Heart sounds: Murmur heard.   Systolic murmur is present with a grade of 1/6.     Pulmonary:      Effort: Pulmonary effort is normal.      Breath sounds: Normal breath sounds.   Abdominal:      Palpations: Abdomen is soft.      Tenderness: There is no abdominal tenderness.   Musculoskeletal:         General: Normal range of motion.      Cervical back: Normal range of motion.      Right lower leg: No edema.      Left lower leg: No edema.   Skin:     General: Skin is warm and dry.      Findings: No erythema.   Neurological:      General: No focal deficit present.      Mental Status: She is alert.      Cranial Nerves: No cranial nerve deficit.   Psychiatric:         Mood and Affect: Mood normal.         Behavior: Behavior normal.         Thought Content: Thought content normal.                 Lab Review:                Results from last 7 days   Lab Units 07/03/21  0510   SODIUM mmol/L 138   POTASSIUM mmol/L 3.9   CHLORIDE mmol/L 97*   CO2 mmol/L 32.9*   BUN mg/dL 9   CREATININE mg/dL 0.69   GLUCOSE mg/dL 105*   CALCIUM mg/dL 8.9         Results from last 7 days   Lab Units 07/03/21  0510   WBC 10*3/mm3 7.50   HEMOGLOBIN g/dL 11.5*   HEMATOCRIT % 34.9   PLATELETS 10*3/mm3 155             Results from last 7 days   Lab Units 07/02/21  1516   MAGNESIUM mg/dL 2.0             I personally viewed and interpreted the patient's EKG/Telemetry data    Assessment/Plan:     1.  Recurrent strep bacteremia    2.  History of tissue mitral valve replacement and tricuspid valve repair    3.  Severe pulmonary hypertension    4.  Chronic hypoxic respiratory failure/COPD    5.  Paroxysmal atrial fibrillation, in sinus  rhythm, status post left atrial appendage closure, not on anticoagulation due to prior GI bleeding    I agree with the recommendation that this patient undergo transesophageal echocardiography.  Given her significant pulmonary hypertension lung disease, I feel that it should be performed in the PACU with the assistance of anesthesiology.  Going into the holiday weekend, I hope that we can schedule this for Tuesday, but sometimes the surgery schedule is exceedingly busy after a long weekend and we are unable to be added onto their schedule.  We will not know until Tuesday morning.  I will make her NPO after midnight the night before.    I will get an EKG to recheck her intervals.

## 2021-07-03 NOTE — PLAN OF CARE
"Goal Outcome Evaluation:  Plan of Care Reviewed With: patient   Pt admit with fever, soa, shaking due to septicemia, hypokalemia, hyperglycemia, chronic hypoxic respiratory failure. Pt admit from home where she lives with  and sibling. Pt is on 1 L O2 at rest and 2 L for amb \"permanently\" and amb inside her home indep without AD. Pt initially declined any PT stating that she did not feel well enough to amb but then agreed to amb inside her room. Pt sup for sup to/from sit, STS with cga and amb 20' seated rest with O2 sat at 88%. Pt educated on PLB and amb another 40' with cga no AD. Pt demonstrated general weakness, impaired endurance , impaired balance with amb. She will likely benefit from cont skilled PT for progression toward indep amb household distances with least vs no AD. Anticipate home at d/c.            "

## 2021-07-03 NOTE — PLAN OF CARE
Goal Outcome Evaluation:  Plan of Care Reviewed With: patient        Progress: no change  Outcome Summary: From ER with septicemia, VSS, NSR, potassium 2.7 - treated per protocol - awaiting 0430 lab results, Norco x1 for back pain, Zofran x1, prn breathing treatment x1, up to BSC with minimal assist - increased SOA with activity, A&O, [place], did not rest well

## 2021-07-03 NOTE — PROGRESS NOTES
Name: Paz Browne ADMIT: 2021   : 1955  PCP: Javan Martinez MD    MRN: [id number] LOS: 1 days   AGE/SEX: 66 y.o. female  ROOM: [facility]   Subjective   Chief Complaint   Patient presents with   • Abnormal Lab   • Fever     67 yo with history of previous MV replacement, TV repair, COPD, chronic resp failure on home oxygen 3L who presents with recurrent strep bacteremia.    On IV ABX with ceftriaxone  Admission to the hospital last month    ROS  No f/c  No n/v  No cp/palp  No soa/cough    Objective   Vital Signs  Temp:  [96.4 °F (35.8 °C)-98.4 °F (36.9 °C)] 98.1 °F (36.7 °C)  Heart Rate:  [67-97] 67  Resp:  [16-20] 18  BP: (122-168)/(54-98) 138/63  SpO2:  [94 %-98 %] 98 %  on  Flow (L/min):  [1-2] 1;   Device (Oxygen Therapy): nasal cannula  Body mass index is 20.61 kg/m².    Physical Exam  Constitutional:       General: She is not in acute distress.     Appearance: She is well-developed.   HENT:      Head: Normocephalic and atraumatic.   Eyes:      General: No scleral icterus.  Cardiovascular:      Rate and Rhythm: Regular rhythm.      Heart sounds: Normal heart sounds.   Pulmonary:      Effort: Pulmonary effort is normal. No respiratory distress.   Abdominal:      General: There is no distension.      Palpations: Abdomen is soft.   Musculoskeletal:      Cervical back: Neck supple.   Skin:     Coloration: Skin is pale.   Neurological:      Mental Status: She is alert.   Psychiatric:         Behavior: Behavior normal.         Results Review:       I reviewed the patient's new clinical results.  Results from last 7 days   Lab Units 21  0510 21  1516   WBC 10*3/mm3 7.50 9.69   HEMOGLOBIN g/dL 11.5* 12.1   PLATELETS 10*3/mm3 155 147     Results from last 7 days   Lab Units 21  0510 21  1516   SODIUM mmol/L 138 139   POTASSIUM mmol/L 3.9 2.7*   CHLORIDE mmol/L 97* 94*   CO2 mmol/L 32.9* 33.8*   BUN mg/dL 9 13   CREATININE mg/dL 0.69 0.79   GLUCOSE mg/dL 105* 117*   Estimated  Creatinine Clearance: 63.4 mL/min (by C-G formula based on SCr of 0.69 mg/dL).  Results from last 7 days   Lab Units 07/02/21  1516   ALBUMIN g/dL 4.20   BILIRUBIN mg/dL 1.0   ALK PHOS U/L 72   AST (SGOT) U/L 20   ALT (SGPT) U/L 18     Results from last 7 days   Lab Units 07/03/21  0510 07/02/21  1516   CALCIUM mg/dL 8.9 9.0   ALBUMIN g/dL  --  4.20   MAGNESIUM mg/dL  --  2.0     Results from last 7 days   Lab Units 07/02/21  1516   PROCALCITONIN ng/mL 0.28*   LACTATE mmol/L 0.9       Coag     HbA1C   Lab Results   Component Value Date    HGBA1C 5.7 (H) 09/01/2014     Infection   Results from last 7 days   Lab Units 07/02/21  1608 07/02/21  1516 07/01/21  1509 07/01/21  1502   BLOODCX  Abnormal Stain* Abnormal Stain* Gram Positive Cocci* Gram Positive Cocci*   BCIDPCR   --  Streptococcus spp, not A, B, or pneumoniae. Identification by BCID PCR.*  --   --    PROCALCITONIN ng/mL  --  0.28*  --   --      Radiology(recent) XR Chest 1 View    Result Date: 7/2/2021  Minimal likely atelectasis or infiltrate left lower lung.  This report was finalized on 7/2/2021 3:52 PM by Dr. Chapincito Garcia M.D.      No results found for: TROPONINT, TROPONINI, BNP  No components found for: TSH;2    amLODIPine, 10 mg, Oral, Daily  aspirin, 81 mg, Oral, Daily  atorvastatin, 40 mg, Oral, Daily  budesonide-formoterol, 2 puff, Inhalation, BID - RT  carvedilol, 6.25 mg, Oral, BID With Meals  cefTRIAXone, 2 g, Intravenous, Q24H  cetirizine, 10 mg, Oral, Daily  cyclobenzaprine, 10 mg, Oral, Nightly  enoxaparin, 40 mg, Subcutaneous, Q24H  furosemide, 40 mg, Oral, BID  guaiFENesin, 600 mg, Oral, BID  magnesium oxide, 400 mg, Oral, Daily  pantoprazole, 40 mg, Oral, QAM  polyethylene glycol, 17 g, Oral, Daily  potassium chloride, 20 mEq, Oral, BID With Meals  potassium chloride, 10 mEq, Intravenous, Once   And  potassium chloride, 10 mEq, Intravenous, Once   And  potassium chloride, 10 mEq, Intravenous, Once   And  potassium chloride, 10 mEq,  Intravenous, Once   And  potassium chloride, 10 mEq, Intravenous, Once  roflumilast, 500 mcg, Oral, Daily  rOPINIRole, 2 mg, Oral, Nightly  sertraline, 100 mg, Oral, Daily  tiotropium bromide monohydrate, 2 puff, Inhalation, Daily - RT      O2, 3 L/min, Last Rate: 3 L/min (07/02/21 2039)    Diet Regular; Cardiac      Assessment/Plan      Active Hospital Problems    Diagnosis  POA   • **Bacteremia [R78.81]  Yes   • Septicemia (CMS/[facility]) [A41.9]  Yes   • COPD (chronic obstructive pulmonary disease) (CMS/[facility]) [J44.9]  Yes   • Chronic respiratory failure with hypoxia (CMS/[facility]) [J96.11]  Yes   • History of mitral valve replacement with tissue graft [Z95.4]  Not Applicable   • S/P TVR (tricuspid valve repair) [Z98.890]  Not Applicable   • Pulmonary hypertension (CMS/[facility]) [I27.20]  Yes   • PAF (paroxysmal atrial fibrillation) (CMS/[facility]) [I48.0]  Yes      Resolved Hospital Problems   No resolved problems to display.     67 yo with history of previous MV replacement, TV repair, COPD, chronic resp failure on home oxygen 3L who presents with recurrent strep bacteremia.    · On IV ABX with ceftriaxone, ID following  · Cardiology consultation for potential CHIO  · Review of records show C-Scope 2 months ago  · Working with PT, monitor closely  · Monitor resp status closely      ABE RN  Reviewed records    Sonny Burroughs MD  [facility]  07/03/21  13:11 EDT

## 2021-07-03 NOTE — OUTREACH NOTE
COPD/PN Week 3 Survey      Responses   [facility] patient discharged from?  [place]   Does the patient have one of the following disease processes/diagnoses(primary or secondary)?  COPD/Pneumonia   Week 3 attempt successful?  No   Revoke  Readmitted          Jena Sloan RN

## 2021-07-03 NOTE — PLAN OF CARE
Goal Outcome Evaluation:           Progress: improving  Outcome Summary: VSS, Respiratory status normal O2 at 1 L, normally 2L at home.  Cough in minimal, chest discomfort minimal.  Up to chair and cammode independent.  ID seen and Cardiology consulted.

## 2021-07-03 NOTE — PLAN OF CARE
Problem: Adult Inpatient Plan of Care  Goal: Plan of Care Review  Outcome: Ongoing, Progressing  Flowsheets  Taken 7/3/2021 1511 by Ant Marsh RN  Progress: improving  Outcome Summary: VSS, Respiratory status normal O2 at 1 L, normally 2L at home.  Cough in minimal, chest discomfort minimal.  Up to chair and cammode independent.  ID seen and Cardiology consulted.  Taken 7/3/2021 1142 by Amrita Donnelly PT  Plan of Care Reviewed With: patient  Goal: Patient-Specific Goal (Individualized)  Outcome: Ongoing, Progressing  Goal: Absence of Hospital-Acquired Illness or Injury  Outcome: Ongoing, Progressing  Intervention: Identify and Manage Fall Risk  Recent Flowsheet Documentation  Taken 7/3/2021 1414 by Ant Marsh RN  Safety Promotion/Fall Prevention:   nonskid shoes/slippers when out of bed   muscle strengthening facilitated   room organization consistent   safety round/check completed   toileting scheduled  Taken 7/3/2021 1327 by Ant Marsh RN  Safety Promotion/Fall Prevention:   room organization consistent   safety round/check completed   toileting scheduled  Taken 7/3/2021 1135 by Ant Marsh RN  Safety Promotion/Fall Prevention:   nonskid shoes/slippers when out of bed   muscle strengthening facilitated   room organization consistent   safety round/check completed   toileting scheduled  Taken 7/3/2021 0815 by Ant Marsh RN  Safety Promotion/Fall Prevention:   toileting scheduled   safety round/check completed   room organization consistent   activity supervised   clutter free environment maintained   fall prevention program maintained   muscle strengthening facilitated   nonskid shoes/slippers when out of bed  Intervention: Prevent Skin Injury  Recent Flowsheet Documentation  Taken 7/3/2021 1414 by Ant Marsh RN  Body Position: weight shift assistance provided  Skin Protection: incontinence pads utilized  Taken 7/3/2021 1327 by Ant Marsh RN  Body Position: position changed  independently  Taken 7/3/2021 1238 by Ant Marsh RN  Body Position:   weight shift assistance provided   position changed independently  Taken 7/3/2021 1135 by Ant Marsh RN  Body Position: weight shift assistance provided  Taken 7/3/2021 0815 by Ant Marsh RN  Body Position: weight shift assistance provided  Skin Protection: incontinence pads utilized  Intervention: Prevent and Manage VTE (venous thromboembolism) Risk  Recent Flowsheet Documentation  Taken 7/3/2021 1414 by Ant Marsh RN  VTE Prevention/Management:   bilateral   dorsiflexion/plantar flexion performed   bleeding risk factor(s) identified  Taken 7/3/2021 0815 by Ant Marsh RN  VTE Prevention/Management:   bilateral   dorsiflexion/plantar flexion performed   bleeding risk factor(s) identified  Intervention: Prevent Infection  Recent Flowsheet Documentation  Taken 7/3/2021 1414 by Ant Marsh RN  Infection Prevention:   cohorting utilized   environmental surveillance performed   hand hygiene promoted   personal protective equipment utilized   rest/sleep promoted   single patient room provided   equipment surfaces disinfected  Taken 7/3/2021 1327 by Ant Marsh RN  Infection Prevention:   cohorting utilized   environmental surveillance performed   equipment surfaces disinfected   hand hygiene promoted   rest/sleep promoted   single patient room provided   personal protective equipment utilized  Taken 7/3/2021 1135 by Ant Marsh RN  Infection Prevention:   cohorting utilized   environmental surveillance performed   equipment surfaces disinfected   hand hygiene promoted   personal protective equipment utilized   rest/sleep promoted   single patient room provided  Taken 7/3/2021 0815 by Ant Marsh RN  Infection Prevention:   cohorting utilized   environmental surveillance performed   equipment surfaces disinfected   hand hygiene promoted   personal protective equipment utilized   rest/sleep promoted   single patient room  provided  Goal: Optimal Comfort and Wellbeing  Outcome: Ongoing, Progressing  Intervention: Provide Person-Centered Care  Recent Flowsheet Documentation  Taken 7/3/2021 1414 by Ant Marsh RN  Trust Relationship/Rapport:   care explained   choices provided   questions answered   reassurance provided   questions encouraged   thoughts/feelings acknowledged  Taken 7/3/2021 0815 by Ant Marsh, RN  Trust Relationship/Rapport:   care explained   choices provided   questions answered   reassurance provided   thoughts/feelings acknowledged   questions encouraged  Goal: Readiness for Transition of Care  Outcome: Ongoing, Progressing   Goal Outcome Evaluation:           Progress: improving  Outcome Summary: VSS, Respiratory status normal O2 at 1 L, normally 2L at home.  Cough in minimal, chest discomfort minimal.  Up to chair and cammode independent.  ID seen and Cardiology consulted.

## 2021-07-03 NOTE — PROGRESS NOTES
LOS: 1 day     Chief Complaint: Recurrent strep bacteremia    Interval History: Afebrile, states she feels okay.  Denies abdominal pain.  Has chronic nausea no vomiting.  No diarrhea tolerating antibiotics without a rash    Vital Signs  Temp:  [96.4 °F (35.8 °C)-98.4 °F (36.9 °C)] 97.5 °F (36.4 °C)  Heart Rate:  [74-97] 75  Resp:  [16-20] 16  BP: (122-168)/(54-98) 138/75    Physical Exam:  General: In no acute distress  Cardiovascular: RRR, no murmurs  Respiratory: Crackles at the bases bilaterally  GI: Soft, NT/ND, + bowel sounds bilaterally  Skin: No rashes     Antibiotics:  Ceftriaxone 2 g IV every 24 hours     Results Review:    Lab Results   Component Value Date    WBC 7.50 07/03/2021    HGB 11.5 (L) 07/03/2021    HCT 34.9 07/03/2021    MCV 87.7 07/03/2021     07/03/2021     Lab Results   Component Value Date    GLUCOSE 105 (H) 07/03/2021    BUN 9 07/03/2021    CREATININE 0.69 07/03/2021    EGFRIFNONA 85 07/03/2021    BCR 13.0 07/03/2021    CO2 32.9 (H) 07/03/2021    CALCIUM 8.9 07/03/2021    ALBUMIN 4.20 07/02/2021    AST 20 07/02/2021    ALT 18 07/02/2021       Microbiology:  6/14 RPP: + coronavirus NL63  6/14 BCx: Streptococcus gallolyticus   6/16 BCx: negative  7/1 BCx: GPCs in chains x2  7/2 BCx strep species x 2  7/2 COVID neg  7/3 BCx P x 2    Assessment/Plan   1. Strep gallolyticus septicemia  2. Chronic hypoxic respiratory failure (3L NC at home)  3. Mitral valve replaced (8/2014)  4. Tricuspid valve repair (8/2014)    Continue ceftriaxone 2 g IV every 24 hours.  Repeat blood cultures have been obtained today and are pending.  Blood cultures from July 2 + for strep species.  Await sensitivity data.  Plan on consulting cardiology on Monday for possible CHIO on Tuesday.  Patient is afebrile with a normal white blood cell count.    I will see the patient again on Monday.  Please call with questions or concerns

## 2021-07-04 PROBLEM — R51.9 HEADACHE: Status: ACTIVE | Noted: 2021-07-04

## 2021-07-04 LAB
BACTERIA SPEC AEROBE CULT: ABNORMAL
GRAM STN SPEC: ABNORMAL
ISOLATED FROM: ABNORMAL
ISOLATED FROM: ABNORMAL
QT INTERVAL: 423 MS

## 2021-07-04 PROCEDURE — 94799 UNLISTED PULMONARY SVC/PX: CPT

## 2021-07-04 PROCEDURE — 25010000003 CEFTRIAXONE PER 250 MG: Performed by: INTERNAL MEDICINE

## 2021-07-04 PROCEDURE — 25010000002 ENOXAPARIN PER 10 MG: Performed by: INTERNAL MEDICINE

## 2021-07-04 RX ORDER — BUTALBITAL, ACETAMINOPHEN AND CAFFEINE 50; 325; 40 MG/1; MG/1; MG/1
2 TABLET ORAL EVERY 4 HOURS PRN
Status: DISCONTINUED | OUTPATIENT
Start: 2021-07-04 | End: 2021-07-07 | Stop reason: HOSPADM

## 2021-07-04 RX ADMIN — CARVEDILOL 6.25 MG: 6.25 TABLET, FILM COATED ORAL at 08:22

## 2021-07-04 RX ADMIN — POLYETHYLENE GLYCOL 3350 17 G: 17 POWDER, FOR SOLUTION ORAL at 11:21

## 2021-07-04 RX ADMIN — ROPINIROLE 2 MG: 2 TABLET, FILM COATED ORAL at 21:32

## 2021-07-04 RX ADMIN — CYCLOBENZAPRINE 10 MG: 10 TABLET, FILM COATED ORAL at 21:32

## 2021-07-04 RX ADMIN — BUDESONIDE AND FORMOTEROL FUMARATE DIHYDRATE 2 PUFF: 160; 4.5 AEROSOL RESPIRATORY (INHALATION) at 22:30

## 2021-07-04 RX ADMIN — PANTOPRAZOLE SODIUM 40 MG: 40 TABLET, DELAYED RELEASE ORAL at 06:25

## 2021-07-04 RX ADMIN — HYDROCODONE BITARTRATE AND ACETAMINOPHEN 1 TABLET: 5; 325 TABLET ORAL at 06:27

## 2021-07-04 RX ADMIN — ATORVASTATIN CALCIUM 40 MG: 20 TABLET, FILM COATED ORAL at 08:22

## 2021-07-04 RX ADMIN — ASPIRIN 81 MG: 81 TABLET, COATED ORAL at 11:21

## 2021-07-04 RX ADMIN — Medication 3 MG: at 21:32

## 2021-07-04 RX ADMIN — ROFLUMILAST 500 MCG: 500 TABLET ORAL at 08:22

## 2021-07-04 RX ADMIN — POTASSIUM CHLORIDE 20 MEQ: 750 TABLET, EXTENDED RELEASE ORAL at 08:23

## 2021-07-04 RX ADMIN — POTASSIUM CHLORIDE 20 MEQ: 750 TABLET, EXTENDED RELEASE ORAL at 18:15

## 2021-07-04 RX ADMIN — BUDESONIDE AND FORMOTEROL FUMARATE DIHYDRATE 2 PUFF: 160; 4.5 AEROSOL RESPIRATORY (INHALATION) at 06:58

## 2021-07-04 RX ADMIN — CARVEDILOL 6.25 MG: 6.25 TABLET, FILM COATED ORAL at 18:15

## 2021-07-04 RX ADMIN — CEFTRIAXONE SODIUM 2 G: 2 INJECTION, SOLUTION INTRAVENOUS at 14:16

## 2021-07-04 RX ADMIN — SERTRALINE 100 MG: 100 TABLET, FILM COATED ORAL at 08:22

## 2021-07-04 RX ADMIN — HYDROCODONE BITARTRATE AND ACETAMINOPHEN 1 TABLET: 5; 325 TABLET ORAL at 21:32

## 2021-07-04 RX ADMIN — CETIRIZINE HYDROCHLORIDE ALLERGY 10 MG: 10 TABLET ORAL at 08:22

## 2021-07-04 RX ADMIN — MAGNESIUM OXIDE 400 MG (241.3 MG MAGNESIUM) TABLET 400 MG: TABLET at 08:22

## 2021-07-04 RX ADMIN — GUAIFENESIN 600 MG: 600 TABLET, EXTENDED RELEASE ORAL at 08:22

## 2021-07-04 RX ADMIN — FUROSEMIDE 40 MG: 40 TABLET ORAL at 08:22

## 2021-07-04 RX ADMIN — GUAIFENESIN 600 MG: 600 TABLET, EXTENDED RELEASE ORAL at 21:32

## 2021-07-04 RX ADMIN — AMLODIPINE BESYLATE 10 MG: 10 TABLET ORAL at 08:22

## 2021-07-04 RX ADMIN — BUTALBITAL, ACETAMINOPHEN, AND CAFFEINE 2 TABLET: 50; 325; 40 TABLET ORAL at 12:49

## 2021-07-04 RX ADMIN — FUROSEMIDE 40 MG: 40 TABLET ORAL at 21:31

## 2021-07-04 RX ADMIN — TIOTROPIUM BROMIDE INHALATION SPRAY 2 PUFF: 3.12 SPRAY, METERED RESPIRATORY (INHALATION) at 06:57

## 2021-07-04 RX ADMIN — ENOXAPARIN SODIUM 40 MG: 40 INJECTION SUBCUTANEOUS at 21:31

## 2021-07-04 NOTE — PROGRESS NOTES
EKG reviewed -- ectopic atrial rhythm with normal GA interval, normal QRS.    Tentatively planning for CHIO on Tuesday.

## 2021-07-04 NOTE — PROGRESS NOTES
Name: Paz Browne ADMIT: 2021   : 1955  PCP: Javan Martinez MD    MRN: [id number] LOS: 2 days   AGE/SEX: 66 y.o. female  ROOM: [facility]   Subjective   Chief Complaint   Patient presents with   • Abnormal Lab   • Fever     65 yo with history of previous MV replacement, TV repair, COPD, chronic resp failure on home oxygen 3L who presents with recurrent strep bacteremia.    On IV ABX with ceftriaxone  Admission to the hospital last month  +HA today  Not improved with current meds    ROS  No f/c  No n/v  No cp/palp  No soa/cough    Objective   Vital Signs  Temp:  [97.6 °F (36.4 °C)-98.1 °F (36.7 °C)] 97.8 °F (36.6 °C)  Heart Rate:  [67-84] 79  Resp:  [16-18] 16  BP: (120-138)/(47-63) 131/55  SpO2:  [94 %-100 %] 100 %  on  Flow (L/min):  [1-2] 1;   Device (Oxygen Therapy): nasal cannula  Body mass index is 20.67 kg/m².    Physical Exam  Constitutional:       General: She is not in acute distress.     Appearance: She is well-developed.   HENT:      Head: Normocephalic and atraumatic.   Eyes:      General: No scleral icterus.  Cardiovascular:      Rate and Rhythm: Regular rhythm.      Heart sounds: Normal heart sounds.   Pulmonary:      Effort: Pulmonary effort is normal. No respiratory distress.   Abdominal:      General: There is no distension.      Palpations: Abdomen is soft.   Musculoskeletal:      Cervical back: Neck supple.   Skin:     Coloration: Skin is pale.   Neurological:      Mental Status: She is alert.   Psychiatric:         Behavior: Behavior normal.         Results Review:       I reviewed the patient's new clinical results.  Results from last 7 days   Lab Units 21  0510 21  1516   WBC 10*3/mm3 7.50 9.69   HEMOGLOBIN g/dL 11.5* 12.1   PLATELETS 10*3/mm3 155 147     Results from last 7 days   Lab Units 21  0510 21  1516   SODIUM mmol/L 138 139   POTASSIUM mmol/L 3.9 2.7*   CHLORIDE mmol/L 97* 94*   CO2 mmol/L 32.9* 33.8*   BUN mg/dL 9 13   CREATININE mg/dL 0.69  0.79   GLUCOSE mg/dL 105* 117*   Estimated Creatinine Clearance: 63.6 mL/min (by C-G formula based on SCr of 0.69 mg/dL).  Results from last 7 days   Lab Units 07/02/21  1516   ALBUMIN g/dL 4.20   BILIRUBIN mg/dL 1.0   ALK PHOS U/L 72   AST (SGOT) U/L 20   ALT (SGPT) U/L 18     Results from last 7 days   Lab Units 07/03/21  0510 07/02/21  1516   CALCIUM mg/dL 8.9 9.0   ALBUMIN g/dL  --  4.20   MAGNESIUM mg/dL  --  2.0     Results from last 7 days   Lab Units 07/02/21  1516   PROCALCITONIN ng/mL 0.28*   LACTATE mmol/L 0.9       Coag     HbA1C   Lab Results   Component Value Date    HGBA1C 5.7 (H) 09/01/2014     Infection   Results from last 7 days   Lab Units 07/03/21  0513 07/03/21  0506 07/02/21  1608 07/02/21  1516 07/01/21  1509   BLOODCX  No growth at 24 hours No growth at 24 hours Streptococcus gallolyticus ssp pasteurianus* Streptococcus gallolyticus ssp pasteurianus* Streptococcus gallolyticus ssp pasteurianus*   BCIDPCR   --   --   --  Streptococcus spp, not A, B, or pneumoniae. Identification by BCID PCR.*  --    PROCALCITONIN ng/mL  --   --   --  0.28*  --      Radiology(recent) XR Chest 1 View    Result Date: 7/2/2021  Minimal likely atelectasis or infiltrate left lower lung.  This report was finalized on 7/2/2021 3:52 PM by Dr. Chapincito Garcia M.D.      No results found for: TROPONINT, TROPONINI, BNP  No components found for: TSH;2    amLODIPine, 10 mg, Oral, Daily  aspirin, 81 mg, Oral, Daily  atorvastatin, 40 mg, Oral, Daily  budesonide-formoterol, 2 puff, Inhalation, BID - RT  carvedilol, 6.25 mg, Oral, BID With Meals  cefTRIAXone, 2 g, Intravenous, Q24H  cetirizine, 10 mg, Oral, Daily  cyclobenzaprine, 10 mg, Oral, Nightly  enoxaparin, 40 mg, Subcutaneous, Q24H  furosemide, 40 mg, Oral, BID  guaiFENesin, 600 mg, Oral, BID  magnesium oxide, 400 mg, Oral, Daily  pantoprazole, 40 mg, Oral, QAM  polyethylene glycol, 17 g, Oral, Daily  potassium chloride, 20 mEq, Oral, BID With Meals  potassium  chloride, 10 mEq, Intravenous, Once   And  potassium chloride, 10 mEq, Intravenous, Once   And  potassium chloride, 10 mEq, Intravenous, Once   And  potassium chloride, 10 mEq, Intravenous, Once   And  potassium chloride, 10 mEq, Intravenous, Once  roflumilast, 500 mcg, Oral, Daily  rOPINIRole, 2 mg, Oral, Nightly  sertraline, 100 mg, Oral, Daily  tiotropium bromide monohydrate, 2 puff, Inhalation, Daily - RT      O2, 3 L/min, Last Rate: 3 L/min (07/02/21 2039)    Diet Regular; Cardiac  NPO Diet      Assessment/Plan      Active Hospital Problems    Diagnosis  POA   • **Bacteremia [R78.81]  Yes   • Headache [R51.9]  Unknown   • Septicemia (CMS/[facility]) [A41.9]  Yes   • COPD (chronic obstructive pulmonary disease) (CMS/[facility]) [J44.9]  Yes   • Chronic respiratory failure with hypoxia (CMS/[facility]) [J96.11]  Yes   • History of mitral valve replacement with tissue graft [Z95.4]  Not Applicable   • S/P TVR (tricuspid valve repair) [Z98.890]  Not Applicable   • Pulmonary hypertension (CMS/[facility]) [I27.20]  Yes   • PAF (paroxysmal atrial fibrillation) (CMS/[facility]) [I48.0]  Yes      Resolved Hospital Problems   No resolved problems to display.     65 yo with history of previous MV replacement, TV repair, COPD, chronic resp failure on home oxygen 3L who presents with recurrent strep bacteremia.    · On IV ABX with ceftriaxone, ID following  · Cardiology has seen with plans for potential CHIO on tuesday  · Review of records show C-Scope 2 months ago  · Working with PT, monitor closely  · Monitor resp status closely  · Add additional Agents for moderate JAMES      DW RN  Reviewed records    Sonny Burroughs MD  [facility]  07/04/21  13:11 EDT

## 2021-07-04 NOTE — PLAN OF CARE
Goal Outcome Evaluation:  Plan of Care Reviewed With: patient        Progress: no change  Outcome Summary: 2L NC. Denies pain/needs. VSS

## 2021-07-04 NOTE — PLAN OF CARE
Problem: Adult Inpatient Plan of Care  Goal: Plan of Care Review  Outcome: Ongoing, Progressing  Flowsheets  Taken 7/4/2021 1623 by Ant Marsh RN  Progress: no change  Outcome Summary: Still just awating for Tuesday when she gets a CHIO.  No issues, VSS, occasional HA, and continue to support respiratory status.  Check covid on Monday.  Taken 7/4/2021 0322 by Ashley Carter RN  Plan of Care Reviewed With: patient  Goal: Patient-Specific Goal (Individualized)  Outcome: Ongoing, Progressing  Goal: Absence of Hospital-Acquired Illness or Injury  Outcome: Ongoing, Progressing  Intervention: Identify and Manage Fall Risk  Recent Flowsheet Documentation  Taken 7/4/2021 1600 by Ant Marsh RN  Safety Promotion/Fall Prevention:   room organization consistent   safety round/check completed   toileting scheduled  Taken 7/4/2021 1418 by Ant Marsh RN  Safety Promotion/Fall Prevention:   room organization consistent   safety round/check completed   toileting scheduled  Taken 7/4/2021 1251 by Ant Marsh RN  Safety Promotion/Fall Prevention:   muscle strengthening facilitated   nonskid shoes/slippers when out of bed   room organization consistent   safety round/check completed   toileting scheduled  Taken 7/4/2021 1159 by Ant Marsh RN  Safety Promotion/Fall Prevention:   room organization consistent   safety round/check completed   toileting scheduled  Taken 7/4/2021 1000 by Ant Marsh RN  Safety Promotion/Fall Prevention:   muscle strengthening facilitated   nonskid shoes/slippers when out of bed   safety round/check completed   room organization consistent   toileting scheduled  Taken 7/4/2021 0736 by Ant Marsh RN  Safety Promotion/Fall Prevention:   room organization consistent   safety round/check completed   toileting scheduled  Intervention: Prevent Skin Injury  Recent Flowsheet Documentation  Taken 7/4/2021 1600 by Ant Marsh RN  Body Position: weight shift assistance  provided  Taken 7/4/2021 1418 by Ant Marhs RN  Body Position: weight shift assistance provided  Taken 7/4/2021 1251 by Ant Marsh RN  Body Position:   dangle, side of bed   weight shift assistance provided  Taken 7/4/2021 1159 by Ant Marsh RN  Body Position: position changed independently  Taken 7/4/2021 1000 by Ant Marsh RN  Body Position: side-lying, right  Taken 7/4/2021 0736 by Ant Marsh RN  Body Position: weight shift assistance provided  Intervention: Prevent and Manage VTE (venous thromboembolism) Risk  Recent Flowsheet Documentation  Taken 7/4/2021 1418 by Ant Marsh RN  VTE Prevention/Management:   bilateral   dorsiflexion/plantar flexion performed   bleeding risk factor(s) identified  Taken 7/4/2021 0736 by Ant Marsh RN  VTE Prevention/Management:   bilateral   dorsiflexion/plantar flexion performed   bleeding risk factor(s) identified  Intervention: Prevent Infection  Recent Flowsheet Documentation  Taken 7/4/2021 1600 by Ant Marsh RN  Infection Prevention:   cohorting utilized   environmental surveillance performed   hand hygiene promoted   personal protective equipment utilized   rest/sleep promoted   single patient room provided   equipment surfaces disinfected  Taken 7/4/2021 1418 by Ant Marsh RN  Infection Prevention:   cohorting utilized   environmental surveillance performed   equipment surfaces disinfected   rest/sleep promoted   single patient room provided   hand hygiene promoted   personal protective equipment utilized  Taken 7/4/2021 1251 by Ant Marsh RN  Infection Prevention:   cohorting utilized   environmental surveillance performed   equipment surfaces disinfected   hand hygiene promoted   personal protective equipment utilized   rest/sleep promoted   single patient room provided  Taken 7/4/2021 1159 by Ant Marsh RN  Infection Prevention:   cohorting utilized   environmental surveillance performed   equipment surfaces disinfected    single patient room provided   rest/sleep promoted   personal protective equipment utilized   hand hygiene promoted  Taken 7/4/2021 1000 by Ant Marsh, RN  Infection Prevention:   cohorting utilized   environmental surveillance performed   hand hygiene promoted   equipment surfaces disinfected   rest/sleep promoted   personal protective equipment utilized   single patient room provided  Taken 7/4/2021 0736 by Ant Marsh RN  Infection Prevention:   cohorting utilized   environmental surveillance performed   hand hygiene promoted   personal protective equipment utilized   rest/sleep promoted   single patient room provided   equipment surfaces disinfected  Goal: Optimal Comfort and Wellbeing  Outcome: Ongoing, Progressing  Intervention: Provide Person-Centered Care  Recent Flowsheet Documentation  Taken 7/4/2021 0736 by Ant Marsh RN  Trust Relationship/Rapport:   care explained   choices provided   questions answered   questions encouraged   reassurance provided   thoughts/feelings acknowledged  Goal: Readiness for Transition of Care  Outcome: Ongoing, Progressing   Goal Outcome Evaluation:           Progress: no change  Outcome Summary: Still just awating for Tuesday when she gets a CHIO.  No issues, VSS, occasional HA, and continue to support respiratory status.  Check covid on Monday.

## 2021-07-05 LAB — SARS-COV-2 ORF1AB RESP QL NAA+PROBE: NOT DETECTED

## 2021-07-05 PROCEDURE — 94799 UNLISTED PULMONARY SVC/PX: CPT

## 2021-07-05 PROCEDURE — U0004 COV-19 TEST NON-CDC HGH THRU: HCPCS | Performed by: INTERNAL MEDICINE

## 2021-07-05 PROCEDURE — 25010000002 PROCHLORPERAZINE 10 MG/2ML SOLUTION: Performed by: INTERNAL MEDICINE

## 2021-07-05 PROCEDURE — 87040 BLOOD CULTURE FOR BACTERIA: CPT | Performed by: INTERNAL MEDICINE

## 2021-07-05 PROCEDURE — 25010000002 ENOXAPARIN PER 10 MG: Performed by: INTERNAL MEDICINE

## 2021-07-05 PROCEDURE — 25010000002 DIPHENHYDRAMINE PER 50 MG: Performed by: INTERNAL MEDICINE

## 2021-07-05 PROCEDURE — 25010000003 CEFTRIAXONE PER 250 MG: Performed by: INTERNAL MEDICINE

## 2021-07-05 RX ORDER — DIPHENHYDRAMINE HYDROCHLORIDE 50 MG/ML
25 INJECTION INTRAMUSCULAR; INTRAVENOUS ONCE
Status: COMPLETED | OUTPATIENT
Start: 2021-07-05 | End: 2021-07-05

## 2021-07-05 RX ORDER — PROCHLORPERAZINE EDISYLATE 5 MG/ML
10 INJECTION INTRAMUSCULAR; INTRAVENOUS ONCE
Status: COMPLETED | OUTPATIENT
Start: 2021-07-05 | End: 2021-07-05

## 2021-07-05 RX ADMIN — CARVEDILOL 6.25 MG: 6.25 TABLET, FILM COATED ORAL at 09:33

## 2021-07-05 RX ADMIN — DIPHENHYDRAMINE HYDROCHLORIDE 25 MG: 50 INJECTION INTRAMUSCULAR; INTRAVENOUS at 14:45

## 2021-07-05 RX ADMIN — Medication 3 MG: at 21:22

## 2021-07-05 RX ADMIN — AMLODIPINE BESYLATE 10 MG: 10 TABLET ORAL at 09:34

## 2021-07-05 RX ADMIN — BUDESONIDE AND FORMOTEROL FUMARATE DIHYDRATE 2 PUFF: 160; 4.5 AEROSOL RESPIRATORY (INHALATION) at 19:46

## 2021-07-05 RX ADMIN — PROCHLORPERAZINE EDISYLATE 10 MG: 5 INJECTION INTRAMUSCULAR; INTRAVENOUS at 14:45

## 2021-07-05 RX ADMIN — TIOTROPIUM BROMIDE INHALATION SPRAY 2 PUFF: 3.12 SPRAY, METERED RESPIRATORY (INHALATION) at 07:39

## 2021-07-05 RX ADMIN — GUAIFENESIN 600 MG: 600 TABLET, EXTENDED RELEASE ORAL at 09:34

## 2021-07-05 RX ADMIN — HYDROCODONE BITARTRATE AND ACETAMINOPHEN 1 TABLET: 5; 325 TABLET ORAL at 06:14

## 2021-07-05 RX ADMIN — ROFLUMILAST 500 MCG: 500 TABLET ORAL at 09:34

## 2021-07-05 RX ADMIN — ASPIRIN 81 MG: 81 TABLET, COATED ORAL at 09:34

## 2021-07-05 RX ADMIN — ENOXAPARIN SODIUM 40 MG: 40 INJECTION SUBCUTANEOUS at 21:14

## 2021-07-05 RX ADMIN — ROPINIROLE 2 MG: 2 TABLET, FILM COATED ORAL at 21:14

## 2021-07-05 RX ADMIN — BUTALBITAL, ACETAMINOPHEN, AND CAFFEINE 2 TABLET: 50; 325; 40 TABLET ORAL at 21:22

## 2021-07-05 RX ADMIN — GUAIFENESIN 600 MG: 600 TABLET, EXTENDED RELEASE ORAL at 21:14

## 2021-07-05 RX ADMIN — FUROSEMIDE 40 MG: 40 TABLET ORAL at 09:33

## 2021-07-05 RX ADMIN — BUDESONIDE AND FORMOTEROL FUMARATE DIHYDRATE 2 PUFF: 160; 4.5 AEROSOL RESPIRATORY (INHALATION) at 07:38

## 2021-07-05 RX ADMIN — POTASSIUM CHLORIDE 20 MEQ: 750 TABLET, EXTENDED RELEASE ORAL at 09:34

## 2021-07-05 RX ADMIN — CEFTRIAXONE SODIUM 2 G: 2 INJECTION, SOLUTION INTRAVENOUS at 16:54

## 2021-07-05 RX ADMIN — POLYETHYLENE GLYCOL 3350 17 G: 17 POWDER, FOR SOLUTION ORAL at 09:34

## 2021-07-05 RX ADMIN — ATORVASTATIN CALCIUM 40 MG: 20 TABLET, FILM COATED ORAL at 09:34

## 2021-07-05 RX ADMIN — POTASSIUM CHLORIDE 20 MEQ: 750 TABLET, EXTENDED RELEASE ORAL at 17:37

## 2021-07-05 RX ADMIN — CYCLOBENZAPRINE 10 MG: 10 TABLET, FILM COATED ORAL at 21:14

## 2021-07-05 RX ADMIN — MAGNESIUM OXIDE 400 MG (241.3 MG MAGNESIUM) TABLET 400 MG: TABLET at 09:34

## 2021-07-05 RX ADMIN — HYDROCODONE BITARTRATE AND ACETAMINOPHEN 1 TABLET: 5; 325 TABLET ORAL at 22:48

## 2021-07-05 RX ADMIN — FUROSEMIDE 40 MG: 40 TABLET ORAL at 21:14

## 2021-07-05 RX ADMIN — CETIRIZINE HYDROCHLORIDE ALLERGY 10 MG: 10 TABLET ORAL at 09:34

## 2021-07-05 RX ADMIN — HYDROCODONE BITARTRATE AND ACETAMINOPHEN 1 TABLET: 5; 325 TABLET ORAL at 14:45

## 2021-07-05 RX ADMIN — CARVEDILOL 6.25 MG: 6.25 TABLET, FILM COATED ORAL at 17:37

## 2021-07-05 RX ADMIN — SERTRALINE 100 MG: 100 TABLET, FILM COATED ORAL at 09:33

## 2021-07-05 RX ADMIN — PANTOPRAZOLE SODIUM 40 MG: 40 TABLET, DELAYED RELEASE ORAL at 06:14

## 2021-07-05 NOTE — PROGRESS NOTES
Name: Paz Browne ADMIT: 2021   : 1955  PCP: Javan Martinez MD    MRN: [id number] LOS: 3 days   AGE/SEX: 66 y.o. female  ROOM: [facility] [place]   Subjective   Chief Complaint   Patient presents with   • Abnormal Lab   • Fever     67 yo with history of previous MV replacement, TV repair, COPD, chronic resp failure on home oxygen 3L who presents with recurrent strep bacteremia.    On IV ABX with ceftriaxone  Admission to the hospital last month  +HA today  Not improved with PRN agents  Some cough    ROS  No f/c  No n/v  No cp/palp  Mild soa/cough    Objective   Vital Signs  Temp:  [97.6 °F (36.4 °C)-98 °F (36.7 °C)] 97.6 °F (36.4 °C)  Heart Rate:  [66-82] 68  Resp:  [16-18] 18  BP: (117-146)/(51-69) 128/59  SpO2:  [94 %-98 %] 95 %  on  Flow (L/min):  [1-2] 2;   Device (Oxygen Therapy): nasal cannula  Body mass index is 20.72 kg/m².    Physical Exam  Constitutional:       General: She is not in acute distress.     Appearance: She is well-developed.   HENT:      Head: Normocephalic and atraumatic.   Eyes:      General: No scleral icterus.  Cardiovascular:      Rate and Rhythm: Regular rhythm.      Heart sounds: Normal heart sounds.   Pulmonary:      Effort: Pulmonary effort is normal. No respiratory distress.   Abdominal:      General: There is no distension.      Palpations: Abdomen is soft.   Musculoskeletal:      Cervical back: Neck supple.   Skin:     Coloration: Skin is pale.   Neurological:      Mental Status: She is alert.   Psychiatric:         Behavior: Behavior normal.         Results Review:       I reviewed the patient's new clinical results.  Results from last 7 days   Lab Units 21  0510 21  1516   WBC 10*3/mm3 7.50 9.69   HEMOGLOBIN g/dL 11.5* 12.1   PLATELETS 10*3/mm3 155 147     Results from last 7 days   Lab Units 21  0510 21  1516   SODIUM mmol/L 138 139   POTASSIUM mmol/L 3.9 2.7*   CHLORIDE mmol/L 97* 94*   CO2 mmol/L 32.9* 33.8*   BUN mg/dL 9 13   CREATININE  mg/dL 0.69 0.79   GLUCOSE mg/dL 105* 117*   Estimated Creatinine Clearance: 63.8 mL/min (by C-G formula based on SCr of 0.69 mg/dL).  Results from last 7 days   Lab Units 07/02/21  1516   ALBUMIN g/dL 4.20   BILIRUBIN mg/dL 1.0   ALK PHOS U/L 72   AST (SGOT) U/L 20   ALT (SGPT) U/L 18     Results from last 7 days   Lab Units 07/03/21  0510 07/02/21  1516   CALCIUM mg/dL 8.9 9.0   ALBUMIN g/dL  --  4.20   MAGNESIUM mg/dL  --  2.0     Results from last 7 days   Lab Units 07/02/21  1516   PROCALCITONIN ng/mL 0.28*   LACTATE mmol/L 0.9       Coag     HbA1C   Lab Results   Component Value Date    HGBA1C 5.7 (H) 09/01/2014     Infection   Results from last 7 days   Lab Units 07/03/21  0513 07/03/21  0506 07/02/21  1608 07/02/21  1516 07/01/21  1509   BLOODCX  No growth at 2 days No growth at 2 days Streptococcus gallolyticus ssp pasteurianus* Streptococcus gallolyticus ssp pasteurianus* Streptococcus gallolyticus ssp pasteurianus*   BCIDPCR   --   --   --  Streptococcus spp, not A, B, or pneumoniae. Identification by BCID PCR.*  --    PROCALCITONIN ng/mL  --   --   --  0.28*  --      Radiology(recent) No radiology results for the last day  No results found for: TROPONINT, TROPONINI, BNP  No components found for: TSH;2    amLODIPine, 10 mg, Oral, Daily  aspirin, 81 mg, Oral, Daily  atorvastatin, 40 mg, Oral, Daily  budesonide-formoterol, 2 puff, Inhalation, BID - RT  carvedilol, 6.25 mg, Oral, BID With Meals  cefTRIAXone, 2 g, Intravenous, Q24H  cetirizine, 10 mg, Oral, Daily  cyclobenzaprine, 10 mg, Oral, Nightly  prochlorperazine, 10 mg, Intravenous, Once   And  diphenhydrAMINE, 25 mg, Intravenous, Once  enoxaparin, 40 mg, Subcutaneous, Q24H  furosemide, 40 mg, Oral, BID  guaiFENesin, 600 mg, Oral, BID  magnesium oxide, 400 mg, Oral, Daily  pantoprazole, 40 mg, Oral, QAM  polyethylene glycol, 17 g, Oral, Daily  potassium chloride, 20 mEq, Oral, BID With Meals  potassium chloride, 10 mEq, Intravenous, Once    And  potassium chloride, 10 mEq, Intravenous, Once   And  potassium chloride, 10 mEq, Intravenous, Once   And  potassium chloride, 10 mEq, Intravenous, Once   And  potassium chloride, 10 mEq, Intravenous, Once  roflumilast, 500 mcg, Oral, Daily  rOPINIRole, 2 mg, Oral, Nightly  sertraline, 100 mg, Oral, Daily  tiotropium bromide monohydrate, 2 puff, Inhalation, Daily - RT      O2, 3 L/min, Last Rate: 3 L/min (07/02/21 2039)    Diet Regular; Cardiac  NPO Diet      Assessment/Plan      Active Hospital Problems    Diagnosis  POA   • **Bacteremia [R78.81]  Yes   • Headache [R51.9]  Unknown   • Septicemia (CMS/[facility]) [A41.9]  Yes   • COPD (chronic obstructive pulmonary disease) (CMS/[facility]) [J44.9]  Yes   • Chronic respiratory failure with hypoxia (CMS/[facility]) [J96.11]  Yes   • History of mitral valve replacement with tissue graft [Z95.4]  Not Applicable   • S/P TVR (tricuspid valve repair) [Z98.890]  Not Applicable   • Pulmonary hypertension (CMS/[facility]) [I27.20]  Yes   • PAF (paroxysmal atrial fibrillation) (CMS/[facility]) [I48.0]  Yes      Resolved Hospital Problems   No resolved problems to display.     65 yo with history of previous MV replacement, TV repair, COPD, chronic resp failure on home oxygen 3L who presents with recurrent strep bacteremia.    · On IV ABX with ceftriaxone, ID following  · Cardiology has seen with plans for potential CHIO on tuesday  · Review of records show C-Scope 2 months ago  · Working with PT, monitor closely  · Monitor resp status closely  · Add additional Agents for moderate HA- benadryl and compazine IV x 1      DW RN  DW family  Reviewed records    Sonny Burroughs MD  [facility]  07/05/21  13:11 EDT

## 2021-07-05 NOTE — DISCHARGE PLACEMENT REQUEST
"Paz Moreau (66 y.o. Female)     Date of Birth Social Security Number Address Home Phone MRN    1955  [id number] [facility] [phone] [id number]    Episcopalian Marital Status          Orthodox        Admission Date Admission Type Admitting Provider Attending Provider Department, Room/Bed    7/2/21 Emergency Stingl, MD Manjeet Rascon Alan David, MD [address], [address]1    Discharge Date Discharge Disposition Discharge Destination                       Attending Provider: Sonny Burroughs MD    Allergies: Bupropion, Cephalexin, Metoprolol    Isolation: None   Infection: COVID Screen (preop/placement) (07/04/21)   Code Status: CPR    Ht: 170.2 cm (67\")   Wt: 60 kg (132 lb 4.4 oz)    Admission Cmt: None   Principal Problem: Bacteremia [R78.81]                 Active Insurance as of 7/2/2021     Primary Coverage     Payor Plan Insurance Group Employer/Plan Group    ANTH MEDICARE REPLACEMENT [facility] MEDICARE ADVANTAGE [id number]     Payor Plan Address Payor Plan Phone Number Payor Plan Fax Number Effective Dates    [address] [phone]  1/1/2016 - None Entered    [facility] [address]       Subscriber Name Subscriber Birth Date Member ID       PAZ MOREAU 1955 [id number]                 Emergency Contacts      (Rel.) Home Phone Work Phone Mobile Phone    Chapincito Moreau (Spouse) [phone] -- [phone]    Cinda Moreau (Daughter) -- -- [phone]    GuanakitoMalissa (Daughter) [phone] -- [phone]          "

## 2021-07-06 ENCOUNTER — ANESTHESIA EVENT (OUTPATIENT)
Dept: POSTOP/PACU | Facility: HOSPITAL | Age: 66
End: 2021-07-06

## 2021-07-06 ENCOUNTER — APPOINTMENT (OUTPATIENT)
Dept: POSTOP/PACU | Facility: HOSPITAL | Age: 66
End: 2021-07-06

## 2021-07-06 ENCOUNTER — ANESTHESIA (OUTPATIENT)
Dept: POSTOP/PACU | Facility: HOSPITAL | Age: 66
End: 2021-07-06

## 2021-07-06 VITALS — OXYGEN SATURATION: 98 % | SYSTOLIC BLOOD PRESSURE: 99 MMHG | DIASTOLIC BLOOD PRESSURE: 41 MMHG | HEART RATE: 59 BPM

## 2021-07-06 LAB
BH CV ECHO MEAS - BSA(HAYCOCK): 1.7 M^2
BH CV ECHO MEAS - BSA: 1.7 M^2
BH CV ECHO MEAS - BZI_BMI: 20.5 KILOGRAMS/M^2
BH CV ECHO MEAS - BZI_METRIC_HEIGHT: 170.2 CM
BH CV ECHO MEAS - BZI_METRIC_WEIGHT: 59.4 KG
GLUCOSE BLDC GLUCOMTR-MCNC: 108 MG/DL (ref 70–130)
GLUCOSE BLDC GLUCOMTR-MCNC: 134 MG/DL (ref 70–130)
GLUCOSE BLDC GLUCOMTR-MCNC: 136 MG/DL (ref 70–130)
LV EF 2D ECHO EST: 65 %

## 2021-07-06 PROCEDURE — 99233 SBSQ HOSP IP/OBS HIGH 50: CPT | Performed by: INTERNAL MEDICINE

## 2021-07-06 PROCEDURE — 94799 UNLISTED PULMONARY SVC/PX: CPT

## 2021-07-06 PROCEDURE — 25010000002 HYDROMORPHONE PER 4 MG: Performed by: NURSE ANESTHETIST, CERTIFIED REGISTERED

## 2021-07-06 PROCEDURE — 99222 1ST HOSP IP/OBS MODERATE 55: CPT | Performed by: PHYSICIAN ASSISTANT

## 2021-07-06 PROCEDURE — 82962 GLUCOSE BLOOD TEST: CPT

## 2021-07-06 PROCEDURE — 25010000003 LIDOCAINE 1 % SOLUTION: Performed by: NURSE ANESTHETIST, CERTIFIED REGISTERED

## 2021-07-06 PROCEDURE — 93321 DOPPLER ECHO F-UP/LMTD STD: CPT

## 2021-07-06 PROCEDURE — 99232 SBSQ HOSP IP/OBS MODERATE 35: CPT | Performed by: INTERNAL MEDICINE

## 2021-07-06 PROCEDURE — 93312 ECHO TRANSESOPHAGEAL: CPT | Performed by: INTERNAL MEDICINE

## 2021-07-06 PROCEDURE — 25010000002 PROPOFOL 10 MG/ML EMULSION: Performed by: NURSE ANESTHETIST, CERTIFIED REGISTERED

## 2021-07-06 PROCEDURE — 93325 DOPPLER ECHO COLOR FLOW MAPG: CPT

## 2021-07-06 PROCEDURE — 93325 DOPPLER ECHO COLOR FLOW MAPG: CPT | Performed by: INTERNAL MEDICINE

## 2021-07-06 PROCEDURE — 93312 ECHO TRANSESOPHAGEAL: CPT

## 2021-07-06 PROCEDURE — 93321 DOPPLER ECHO F-UP/LMTD STD: CPT | Performed by: INTERNAL MEDICINE

## 2021-07-06 PROCEDURE — 25010000002 ONDANSETRON PER 1 MG: Performed by: NURSE ANESTHETIST, CERTIFIED REGISTERED

## 2021-07-06 PROCEDURE — 25010000003 PENICILLIN G POTASSIUM PER 600000 UNITS: Performed by: INTERNAL MEDICINE

## 2021-07-06 RX ORDER — PROMETHAZINE HYDROCHLORIDE 25 MG/1
25 SUPPOSITORY RECTAL ONCE AS NEEDED
Status: DISCONTINUED | OUTPATIENT
Start: 2021-07-06 | End: 2021-07-07 | Stop reason: HOSPADM

## 2021-07-06 RX ORDER — PROPOFOL 10 MG/ML
VIAL (ML) INTRAVENOUS CONTINUOUS PRN
Status: DISCONTINUED | OUTPATIENT
Start: 2021-07-06 | End: 2021-07-06 | Stop reason: SURG

## 2021-07-06 RX ORDER — HYDROCODONE BITARTRATE AND ACETAMINOPHEN 7.5; 325 MG/1; MG/1
1 TABLET ORAL ONCE AS NEEDED
Status: COMPLETED | OUTPATIENT
Start: 2021-07-06 | End: 2021-07-06

## 2021-07-06 RX ORDER — EPHEDRINE SULFATE 50 MG/ML
INJECTION, SOLUTION INTRAVENOUS AS NEEDED
Status: DISCONTINUED | OUTPATIENT
Start: 2021-07-06 | End: 2021-07-06 | Stop reason: SURG

## 2021-07-06 RX ORDER — EPHEDRINE SULFATE 50 MG/ML
5 INJECTION, SOLUTION INTRAVENOUS ONCE AS NEEDED
Status: DISCONTINUED | OUTPATIENT
Start: 2021-07-06 | End: 2021-07-07 | Stop reason: HOSPADM

## 2021-07-06 RX ORDER — DIPHENHYDRAMINE HCL 25 MG
25 CAPSULE ORAL
Status: DISCONTINUED | OUTPATIENT
Start: 2021-07-06 | End: 2021-07-07 | Stop reason: HOSPADM

## 2021-07-06 RX ORDER — ONDANSETRON 2 MG/ML
4 INJECTION INTRAMUSCULAR; INTRAVENOUS ONCE AS NEEDED
Status: COMPLETED | OUTPATIENT
Start: 2021-07-06 | End: 2021-07-06

## 2021-07-06 RX ORDER — SODIUM CHLORIDE 9 MG/ML
INJECTION, SOLUTION INTRAVENOUS CONTINUOUS PRN
Status: DISCONTINUED | OUTPATIENT
Start: 2021-07-06 | End: 2021-07-06 | Stop reason: SURG

## 2021-07-06 RX ORDER — FENTANYL CITRATE 50 UG/ML
50 INJECTION, SOLUTION INTRAMUSCULAR; INTRAVENOUS
Status: DISCONTINUED | OUTPATIENT
Start: 2021-07-06 | End: 2021-07-07 | Stop reason: HOSPADM

## 2021-07-06 RX ORDER — OXYCODONE AND ACETAMINOPHEN 10; 325 MG/1; MG/1
1 TABLET ORAL EVERY 4 HOURS PRN
Status: DISCONTINUED | OUTPATIENT
Start: 2021-07-06 | End: 2021-07-07 | Stop reason: HOSPADM

## 2021-07-06 RX ORDER — HYDROMORPHONE HYDROCHLORIDE 1 MG/ML
0.5 INJECTION, SOLUTION INTRAMUSCULAR; INTRAVENOUS; SUBCUTANEOUS
Status: DISCONTINUED | OUTPATIENT
Start: 2021-07-06 | End: 2021-07-07 | Stop reason: HOSPADM

## 2021-07-06 RX ORDER — LIDOCAINE HYDROCHLORIDE 10 MG/ML
INJECTION, SOLUTION INFILTRATION; PERINEURAL AS NEEDED
Status: DISCONTINUED | OUTPATIENT
Start: 2021-07-06 | End: 2021-07-06 | Stop reason: SURG

## 2021-07-06 RX ORDER — LABETALOL HYDROCHLORIDE 5 MG/ML
5 INJECTION, SOLUTION INTRAVENOUS
Status: DISCONTINUED | OUTPATIENT
Start: 2021-07-06 | End: 2021-07-07 | Stop reason: HOSPADM

## 2021-07-06 RX ORDER — PROMETHAZINE HYDROCHLORIDE 25 MG/1
25 TABLET ORAL ONCE AS NEEDED
Status: DISCONTINUED | OUTPATIENT
Start: 2021-07-06 | End: 2021-07-07 | Stop reason: HOSPADM

## 2021-07-06 RX ORDER — DIPHENHYDRAMINE HYDROCHLORIDE 50 MG/ML
12.5 INJECTION INTRAMUSCULAR; INTRAVENOUS
Status: DISCONTINUED | OUTPATIENT
Start: 2021-07-06 | End: 2021-07-07 | Stop reason: HOSPADM

## 2021-07-06 RX ORDER — FLUMAZENIL 0.1 MG/ML
0.2 INJECTION INTRAVENOUS AS NEEDED
Status: DISCONTINUED | OUTPATIENT
Start: 2021-07-06 | End: 2021-07-07 | Stop reason: HOSPADM

## 2021-07-06 RX ORDER — IBUPROFEN 400 MG/1
600 TABLET ORAL ONCE AS NEEDED
Status: DISCONTINUED | OUTPATIENT
Start: 2021-07-06 | End: 2021-07-07 | Stop reason: HOSPADM

## 2021-07-06 RX ORDER — NALOXONE HCL 0.4 MG/ML
0.2 VIAL (ML) INJECTION AS NEEDED
Status: DISCONTINUED | OUTPATIENT
Start: 2021-07-06 | End: 2021-07-07 | Stop reason: HOSPADM

## 2021-07-06 RX ORDER — PROPOFOL 10 MG/ML
VIAL (ML) INTRAVENOUS AS NEEDED
Status: DISCONTINUED | OUTPATIENT
Start: 2021-07-06 | End: 2021-07-06 | Stop reason: SURG

## 2021-07-06 RX ORDER — HYDRALAZINE HYDROCHLORIDE 20 MG/ML
5 INJECTION INTRAMUSCULAR; INTRAVENOUS
Status: DISCONTINUED | OUTPATIENT
Start: 2021-07-06 | End: 2021-07-07 | Stop reason: HOSPADM

## 2021-07-06 RX ADMIN — ASPIRIN 81 MG: 81 TABLET, COATED ORAL at 12:27

## 2021-07-06 RX ADMIN — ATORVASTATIN CALCIUM 40 MG: 20 TABLET, FILM COATED ORAL at 12:27

## 2021-07-06 RX ADMIN — GUAIFENESIN 600 MG: 600 TABLET, EXTENDED RELEASE ORAL at 20:23

## 2021-07-06 RX ADMIN — SODIUM CHLORIDE: 9 INJECTION, SOLUTION INTRAVENOUS at 07:53

## 2021-07-06 RX ADMIN — OXYCODONE AND ACETAMINOPHEN 1 TABLET: 325; 10 TABLET ORAL at 23:59

## 2021-07-06 RX ADMIN — CYCLOBENZAPRINE 10 MG: 10 TABLET, FILM COATED ORAL at 20:23

## 2021-07-06 RX ADMIN — HYDROCODONE BITARTRATE AND ACETAMINOPHEN 1 TABLET: 7.5; 325 TABLET ORAL at 13:55

## 2021-07-06 RX ADMIN — SODIUM CHLORIDE 4 MILLION UNITS: 9 INJECTION, SOLUTION INTRAVENOUS at 23:57

## 2021-07-06 RX ADMIN — EPHEDRINE SULFATE 10 MG: 50 INJECTION INTRAVENOUS at 08:24

## 2021-07-06 RX ADMIN — SODIUM CHLORIDE 4 MILLION UNITS: 9 INJECTION, SOLUTION INTRAVENOUS at 12:51

## 2021-07-06 RX ADMIN — CARVEDILOL 6.25 MG: 6.25 TABLET, FILM COATED ORAL at 12:28

## 2021-07-06 RX ADMIN — FUROSEMIDE 40 MG: 40 TABLET ORAL at 12:28

## 2021-07-06 RX ADMIN — HYDROMORPHONE HYDROCHLORIDE 0.5 MG: 1 INJECTION, SOLUTION INTRAMUSCULAR; INTRAVENOUS; SUBCUTANEOUS at 21:55

## 2021-07-06 RX ADMIN — FUROSEMIDE 40 MG: 40 TABLET ORAL at 20:23

## 2021-07-06 RX ADMIN — EPHEDRINE SULFATE 10 MG: 50 INJECTION INTRAVENOUS at 08:14

## 2021-07-06 RX ADMIN — HYDROCODONE BITARTRATE AND ACETAMINOPHEN 1 TABLET: 5; 325 TABLET ORAL at 20:24

## 2021-07-06 RX ADMIN — MAGNESIUM OXIDE 400 MG (241.3 MG MAGNESIUM) TABLET 400 MG: TABLET at 12:27

## 2021-07-06 RX ADMIN — TIOTROPIUM BROMIDE INHALATION SPRAY 2 PUFF: 3.12 SPRAY, METERED RESPIRATORY (INHALATION) at 09:17

## 2021-07-06 RX ADMIN — ONDANSETRON 4 MG: 2 INJECTION INTRAMUSCULAR; INTRAVENOUS at 09:11

## 2021-07-06 RX ADMIN — LIDOCAINE HYDROCHLORIDE 80 ML: 10 INJECTION, SOLUTION INFILTRATION; PERINEURAL at 07:59

## 2021-07-06 RX ADMIN — BUDESONIDE AND FORMOTEROL FUMARATE DIHYDRATE 2 PUFF: 160; 4.5 AEROSOL RESPIRATORY (INHALATION) at 09:17

## 2021-07-06 RX ADMIN — PROPOFOL 100 MG: 10 INJECTION, EMULSION INTRAVENOUS at 07:59

## 2021-07-06 RX ADMIN — SODIUM CHLORIDE 4 MILLION UNITS: 9 INJECTION, SOLUTION INTRAVENOUS at 17:09

## 2021-07-06 RX ADMIN — POTASSIUM CHLORIDE 20 MEQ: 750 TABLET, EXTENDED RELEASE ORAL at 12:50

## 2021-07-06 RX ADMIN — CARVEDILOL 6.25 MG: 6.25 TABLET, FILM COATED ORAL at 17:08

## 2021-07-06 RX ADMIN — ROPINIROLE 2 MG: 2 TABLET, FILM COATED ORAL at 20:23

## 2021-07-06 RX ADMIN — CETIRIZINE HYDROCHLORIDE ALLERGY 10 MG: 10 TABLET ORAL at 12:29

## 2021-07-06 RX ADMIN — POTASSIUM CHLORIDE 20 MEQ: 750 TABLET, EXTENDED RELEASE ORAL at 17:08

## 2021-07-06 RX ADMIN — BUDESONIDE AND FORMOTEROL FUMARATE DIHYDRATE 2 PUFF: 160; 4.5 AEROSOL RESPIRATORY (INHALATION) at 19:36

## 2021-07-06 RX ADMIN — POLYETHYLENE GLYCOL 3350 17 G: 17 POWDER, FOR SOLUTION ORAL at 12:27

## 2021-07-06 RX ADMIN — PROPOFOL 140 MCG/KG/MIN: 10 INJECTION, EMULSION INTRAVENOUS at 07:59

## 2021-07-06 RX ADMIN — GUAIFENESIN 600 MG: 600 TABLET, EXTENDED RELEASE ORAL at 12:27

## 2021-07-06 RX ADMIN — SODIUM CHLORIDE 4 MILLION UNITS: 9 INJECTION, SOLUTION INTRAVENOUS at 20:23

## 2021-07-06 RX ADMIN — SERTRALINE 100 MG: 100 TABLET, FILM COATED ORAL at 12:28

## 2021-07-06 RX ADMIN — Medication 3 MG: at 21:55

## 2021-07-06 RX ADMIN — AMLODIPINE BESYLATE 10 MG: 10 TABLET ORAL at 12:27

## 2021-07-06 RX ADMIN — ROFLUMILAST 500 MCG: 500 TABLET ORAL at 12:30

## 2021-07-06 RX ADMIN — EPHEDRINE SULFATE 10 MG: 50 INJECTION INTRAVENOUS at 08:18

## 2021-07-06 RX ADMIN — HYDROMORPHONE HYDROCHLORIDE 0.5 MG: 1 INJECTION, SOLUTION INTRAMUSCULAR; INTRAVENOUS; SUBCUTANEOUS at 09:11

## 2021-07-06 NOTE — PROGRESS NOTES
Name: Paz Browne ADMIT: 2021   : 1955  PCP: Javan Martinez MD    MRN: [id number] LOS: 4 days   AGE/SEX: 66 y.o. female  ROOM: [facility]   Subjective   Chief Complaint   Patient presents with   • Abnormal Lab   • Fever     67 yo with history of previous MV replacement, TV repair, COPD, chronic resp failure on home oxygen 3L who presents with recurrent strep bacteremia.    On IV ABX with ceftriaxone  Admission to the hospital last month    CHIO today    ROS  No f/c  No n/v  No cp/palp  Mild soa/cough    Objective   Vital Signs  Temp:  [98 °F (36.7 °C)-98.5 °F (36.9 °C)] 98.5 °F (36.9 °C)  Heart Rate:  [59-76] 76  Resp:  [16-18] 18  BP: ()/(41-67) 149/67  SpO2:  [95 %-99 %] 96 %  on  Flow (L/min):  [1-2] 1.5;   Device (Oxygen Therapy): nasal cannula  Body mass index is 20.72 kg/m².    Physical Exam  Constitutional:       General: She is not in acute distress.     Appearance: She is well-developed.   HENT:      Head: Normocephalic and atraumatic.   Eyes:      General: No scleral icterus.  Cardiovascular:      Rate and Rhythm: Regular rhythm.      Heart sounds: Normal heart sounds.   Pulmonary:      Effort: Pulmonary effort is normal. No respiratory distress.   Abdominal:      General: There is no distension.      Palpations: Abdomen is soft.   Musculoskeletal:      Cervical back: Neck supple.   Skin:     Coloration: Skin is pale.   Neurological:      Mental Status: She is alert.   Psychiatric:         Behavior: Behavior normal.         Results Review:       I reviewed the patient's new clinical results.  Results from last 7 days   Lab Units 21  0510 21  1516   WBC 10*3/mm3 7.50 9.69   HEMOGLOBIN g/dL 11.5* 12.1   PLATELETS 10*3/mm3 155 147     Results from last 7 days   Lab Units 21  0510 21  1516   SODIUM mmol/L 138 139   POTASSIUM mmol/L 3.9 2.7*   CHLORIDE mmol/L 97* 94*   CO2 mmol/L 32.9* 33.8*   BUN mg/dL 9 13   CREATININE mg/dL 0.69 0.79   GLUCOSE mg/dL 105* 117*    Estimated Creatinine Clearance: 63.8 mL/min (by C-G formula based on SCr of 0.69 mg/dL).  Results from last 7 days   Lab Units 07/02/21  1516   ALBUMIN g/dL 4.20   BILIRUBIN mg/dL 1.0   ALK PHOS U/L 72   AST (SGOT) U/L 20   ALT (SGPT) U/L 18     Results from last 7 days   Lab Units 07/03/21  0510 07/02/21  1516   CALCIUM mg/dL 8.9 9.0   ALBUMIN g/dL  --  4.20   MAGNESIUM mg/dL  --  2.0     Results from last 7 days   Lab Units 07/02/21  1516   PROCALCITONIN ng/mL 0.28*   LACTATE mmol/L 0.9       Coag     HbA1C   Lab Results   Component Value Date    HGBA1C 5.7 (H) 09/01/2014     Infection   Results from last 7 days   Lab Units 07/05/21  1455 07/05/21  1446 07/03/21  0513 07/03/21  0506 07/02/21  1608 07/02/21  1516   BLOODCX  No growth at 24 hours No growth at 24 hours No growth at 3 days No growth at 3 days Streptococcus gallolyticus ssp pasteurianus* Streptococcus gallolyticus ssp pasteurianus*   BCIDPCR   --   --   --   --   --  Streptococcus spp, not A, B, or pneumoniae. Identification by BCID PCR.*   PROCALCITONIN ng/mL  --   --   --   --   --  0.28*     Radiology(recent) No radiology results for the last day  No results found for: TROPONINT, TROPONINI, BNP  No components found for: TSH;2    amLODIPine, 10 mg, Oral, Daily  aspirin, 81 mg, Oral, Daily  atorvastatin, 40 mg, Oral, Daily  budesonide-formoterol, 2 puff, Inhalation, BID - RT  carvedilol, 6.25 mg, Oral, BID With Meals  cetirizine, 10 mg, Oral, Daily  cyclobenzaprine, 10 mg, Oral, Nightly  enoxaparin, 40 mg, Subcutaneous, Q24H  furosemide, 40 mg, Oral, BID  guaiFENesin, 600 mg, Oral, BID  magnesium oxide, 400 mg, Oral, Daily  pantoprazole, 40 mg, Oral, QAM  penicillin g (potassium), 4 Million Units, Intravenous, Q4H  polyethylene glycol, 17 g, Oral, Daily  potassium chloride, 20 mEq, Oral, BID With Meals  potassium chloride, 10 mEq, Intravenous, Once   And  potassium chloride, 10 mEq, Intravenous, Once   And  potassium chloride, 10 mEq, Intravenous,  Once   And  potassium chloride, 10 mEq, Intravenous, Once   And  potassium chloride, 10 mEq, Intravenous, Once  roflumilast, 500 mcg, Oral, Daily  rOPINIRole, 2 mg, Oral, Nightly  sertraline, 100 mg, Oral, Daily  tiotropium bromide monohydrate, 2 puff, Inhalation, Daily - RT      O2, 3 L/min, Last Rate: 3 L/min (07/02/21 2039)    Diet Regular; Cardiac      Assessment/Plan      Active Hospital Problems    Diagnosis  POA   • **Bacteremia [R78.81]  Yes   • Headache [R51.9]  Unknown   • Septicemia (CMS/[facility]) [A41.9]  Yes   • COPD (chronic obstructive pulmonary disease) (CMS/[facility]) [J44.9]  Yes   • Chronic respiratory failure with hypoxia (CMS/[facility]) [J96.11]  Yes   • History of mitral valve replacement with tissue graft [Z95.4]  Not Applicable   • S/P TVR (tricuspid valve repair) [Z98.890]  Not Applicable   • Pulmonary hypertension (CMS/[facility]) [I27.20]  Yes   • PAF (paroxysmal atrial fibrillation) (CMS/[facility]) [I48.0]  Yes      Resolved Hospital Problems   No resolved problems to display.     67 yo with history of previous MV replacement, TV repair, COPD, chronic resp failure on home oxygen 3L who presents with recurrent strep bacteremia.    · On IV ABX, ID following  · CHIO today. Seen by CTS now who does not recommend surgery  · Review of records show C-Scope 2 months ago  · Working with PT, monitor closely  · Monitor resp status closely    DW RN  Reviewed records    Had evaluation by CT surgery today which medical management at this point.  Will obtain PICC line tonight and plan for discharge tomorrow.    Greater than 36 minutes spent with greater than 50% counseling and coordinating care      Sonny Burroughs MD  [facility]  07/06/21  13:11 EDT

## 2021-07-06 NOTE — CONSULTS
Patient Care Team:  Javan Martinez MD as PCP - General  Javan Martienz MD as PCP - Family Medicine  LorieAg yao MD as Consulting Physician (Pulmonary Disease)  Cleveland Devine MD as Consulting Physician (Gastroenterology)  Earnest Gan MD as Consulting Physician (Cardiology)  Daniela Shin MD PhD as Consulting Physician (Hematology and Oncology)  Javan Martinez MD as Referring Physician (Internal Medicine)    Chief complaint  endocarditis    Subjective     History of Present Illness  Patient is a 66 y.o. female with a past medical history including MVR(tissue), TV repair, MAZE, and left atrial appendage ligation in 2014 by Dr. Polanco. Her medical history also includes, HTN, HLD, COPD on home oxygen, and atrial fib s/p MAZE. She was recently admitted to the hospital with strep septicemia and COPD exacerbation due to pseudomonas and non COVID19 coronavirus. She was following up with infectious disease and repeat blood cultures were drawn. Blood cultures were positive and she was instructed to come to the hospital. She did have some night sweats and chills. She has had a cough since previous admission. Her breathing is at baseline. She is referred for evaluation for possible prosthetic mitral valve endocarditis.       Review of Systems   Constitutional: Positive for chills.   Respiratory: Positive for cough and shortness of breath.    All other systems reviewed and are negative.       Past Medical History:   Diagnosis Date    VAN (acute kidney injury) (CMS/HCC)     Anemia     Asthma     Atrial flutter (CMS/HCC)     cardioversion    Cataract     Celiac artery stenosis (CMS/HCC)     Chronic respiratory failure with hypoxia (CMS/HCC)     Colon polyp     COPD (chronic obstructive pulmonary disease) (CMS/HCC)     Emphysema of lung (CMS/HCC)     GI bleed     Hiatal hernia     History of CHF (congestive heart failure)     due to MR    History of home oxygen therapy     3 lpm NC    History of mitral  valve replacement with tissue graft     Hyperlipidemia     Hypertension     Infectious viral hepatitis     B    Intertrigo     Long term (current) use of anticoagulants     Mitral regurgitation     s/p tissue MVR    PAF (paroxysmal atrial fibrillation) (CMS/HCC)     s/p MAZE    Pneumonia     Pulmonary hypertension (CMS/HCC)     S/P TVR (tricuspid valve repair) 7/7/2016     Past Surgical History:   Procedure Laterality Date    CARDIAC CATHETERIZATION  09/01/2014    Right dominant systemt, normal coronary arteries.     CARDIAC CATHETERIZATION Left 6/10/2016    Procedure: Cardiac catheterization;  Surgeon: Sergei Hall MD;  Location: [facility] CATH INVASIVE LOCATION;  Service:     CARDIAC CATHETERIZATION N/A 6/10/2016    Procedure: Right Heart Cath;  Surgeon: Sergei Hall MD;  Location: [facility] CATH INVASIVE LOCATION;  Service:     CATARACT EXTRACTION      COLONOSCOPY      COLONOSCOPY N/A 8/4/2017    Procedure: COLONOSCOPY TO CECUM/TI WITH POLYPECTOMY ( COLD BX);  Surgeon: Cleveland Devine MD;  Location: [facility] ENDOSCOPY;  Service:     COLONOSCOPY N/A 8/10/2017    Procedure: COLONOSCOPY to cecum and TI with 2 clips placed at transverse;  Surgeon: Earnest PALOMO MD;  Location: [facility] ENDOSCOPY;  Service:     COLONOSCOPY N/A 12/22/2017    Procedure: COLONOSCOPY INTO CECUM WITH COLD POLYPECTOMIES;  Surgeon: Cleveland Devine MD;  Location: [facility] ENDOSCOPY;  Service:     COLONOSCOPY N/A 5/17/2021    Procedure: COLONOSCOPY to cecum;  Surgeon: Cleveland Devine MD;  Location: [facility] ENDOSCOPY;  Service: Gastroenterology;  Laterality: N/A;  Pre: Fe deficency anemia, h/x of polyps  Post: fair prep, normal    ENDOSCOPY N/A 8/17/2017    Procedure: ESOPHAGOGASTRODUODENOSCOPY;  Surgeon: Porsha Ruby MD;  Location: [facility] ENDOSCOPY;  Service:     ENDOSCOPY N/A 12/22/2017    Procedure: ESOPHAGOGASTRODUODENOSCOPY WITH BIOPSIES;  Surgeon: Cleveland Devine MD;  Location: [facility] ENDOSCOPY;  Service:     ENDOSCOPY N/A 5/17/2021    Procedure:  ESOPHAGOGASTRODUODENOSCOPY  with biopsies;  Surgeon: Cleveland Devine MD;  Location: [facility] ENDOSCOPY;  Service: Gastroenterology;  Laterality: N/A;  Pre: Fe deficency anemia, nausea, heme positive stool   Post: gastritis, sloughing of distal esophagus mucosa    GALLBLADDER SURGERY      HEMORRHOIDECTOMY      HYSTERECTOMY      KIDNEY SURGERY  2013    Stent placement    MAZE PROCEDURE      MITRAL VALVE REPLACEMENT      TONSILLECTOMY      TRICUSPID VALVE REPLACEMENT       Family History   Adopted: Yes   Problem Relation Age of Onset    No Known Problems Mother     No Known Problems Father      Social History     Tobacco Use    Smoking status: Former Smoker     Quit date:      Years since quittin.5    Smokeless tobacco: Never Used   Vaping Use    Vaping Use: Never used   Substance Use Topics    Alcohol use: No     Comment: caffeine use    Drug use: No     Medications Prior to Admission   Medication Sig Dispense Refill Last Dose    albuterol (PROVENTIL) (2.5 MG/3ML) 0.083% nebulizer solution INHALE 1 VIAL BY MOUTH EVERY 4 HOURS AS NEEDED FOR WHEEZING (Patient taking differently: Take 2.5 mg by nebulization Every 6 (Six) Hours As Needed for Wheezing (uses 2 to 3 times per day).) 150 mL 3     albuterol sulfate  (90 Base) MCG/ACT inhaler Inhale 2 puffs Every 4 (Four) Hours As Needed for Wheezing. 18 g 3     amLODIPine (NORVASC) 10 MG tablet TAKE 1 TABLET BY MOUTH EVERY DAY (Patient taking differently: Take 10 mg by mouth Daily.) 90 tablet 1     atorvastatin (LIPITOR) 40 MG tablet TAKE 1 TABLET BY MOUTH EVERY DAY (Patient taking differently: Take 40 mg by mouth Daily.) 90 tablet 2     carvedilol (COREG) 6.25 MG tablet TAKE 1 TABLET BY MOUTH TWICE A DAY WITH MEALS (Patient taking differently: Take 6.25 mg by mouth 2 (Two) Times a Day With Meals.) 180 tablet 2     cyclobenzaprine (FLEXERIL) 10 MG tablet TAKE 1 TABLET BY MOUTH AT BEDTIME (Patient taking differently: Take 10 mg by mouth every night at  bedtime.) 90 tablet 3     fluticasone-salmeterol (Advair Diskus) 250-50 MCG/DOSE DISKUS Inhale 1 puff 2 (Two) Times a Day. 3 each 3     furosemide (LASIX) 40 MG tablet Take 1 tablet by mouth Daily. (Patient taking differently: Take 40 mg by mouth 2 (Two) Times a Day.) 30 tablet 3     HYDROcodone-acetaminophen (NORCO) 5-325 MG per tablet Take 1 tablet by mouth Every 8 (Eight) Hours As Needed for Moderate Pain . Chronic pain medicine for low back pain 90 tablet 0     omeprazole (priLOSEC) 40 MG capsule TAKE 1 CAPSULE BY MOUTH EVERY DAY (Patient taking differently: Take 40 mg by mouth Daily.) 90 capsule 1     potassium chloride 10 MEQ CR tablet 1 p.o. daily (Patient taking differently: Take 10 mEq by mouth Daily. 1 p.o. daily) 30 tablet 5     rOPINIRole (REQUIP) 2 MG tablet TAKE 1 TABLET BY MOUTH EVERY DAY EVERY NIGHT (Patient taking differently: Take 2 mg by mouth Every Night.) 90 tablet 2     sertraline (ZOLOFT) 100 MG tablet TAKE 1 TABLET BY MOUTH EVERY DAY (Patient taking differently: Take 100 mg by mouth Daily.) 90 tablet 1     tiotropium (Spiriva HandiHaler) 18 MCG per inhalation capsule Place 1 capsule into inhaler and inhale Daily. 90 capsule 3     zaleplon (SONATA) 10 MG capsule TAKE 1 CAPSULE BY MOUTH EVERY NIGHT (Patient taking differently: Take 10 mg by mouth Every Night.) 30 capsule 3     aspirin 81 MG EC tablet Take 1 tablet by mouth Daily.       benzonatate (TESSALON) 100 MG capsule TAKE 1 CAPSULE BY MOUTH 2 TIMES A DAY AS NEEDED FOR COUGH 180 capsule 1     guaiFENesin (MUCINEX) 600 MG 12 hr tablet Take 600 mg by mouth 2 (Two) Times a Day.       loratadine (Claritin) 10 MG tablet Take 10 mg by mouth 2 (two) times a day.       magnesium oxide (MAG-OX) 400 MG tablet TAKE 1 TABLET BY MOUTH EVERY DAY 90 tablet 1     meclizine (ANTIVERT) 25 MG tablet Take 1 tablet by mouth 3 (Three) Times a Day As Needed for Dizziness. prn 30 tablet 3     Multiple Vitamins-Minerals (MULTIVITAL) tablet Take 1 tablet by mouth  Daily.       Nebulizer device 1 Units 4 (Four) Times a Day As Needed (Wheezing). J44.1 j20.8 1 each 0     O2 (OXYGEN) Inhale 3 L/min Continuous.       Omega-3 Fatty Acids (fish oil) 1000 MG capsule capsule Take  by mouth Every Night.       ondansetron (ZOFRAN) 4 MG tablet Take 1 tablet by mouth Every 12 (Twelve) Hours As Needed for Nausea or Vomiting. 90 tablet 3     polyethylene glycol (MIRALAX) packet Take 17 g by mouth 2 (Two) Times a Day. (Patient taking differently: Take 17 g by mouth 2 (Two) Times a Day As Needed.)       predniSONE (DELTASONE) 10 MG tablet Take 4 tabs daily x 3 days, then take 3 tabs daily x 3 days, then take 2 tabs daily x 3 days, then take 1 tab daily x 3 days 31 tablet 0     roflumilast (DALIRESP) 500 MCG tablet tablet Take 1 tablet by mouth Daily. 90 tablet 0     valACYclovir (Valtrex) 1000 MG tablet 1 p.o. 3 times daily x1 week 21 tablet 0      amLODIPine, 10 mg, Oral, Daily  aspirin, 81 mg, Oral, Daily  atorvastatin, 40 mg, Oral, Daily  budesonide-formoterol, 2 puff, Inhalation, BID - RT  carvedilol, 6.25 mg, Oral, BID With Meals  cetirizine, 10 mg, Oral, Daily  cyclobenzaprine, 10 mg, Oral, Nightly  enoxaparin, 40 mg, Subcutaneous, Q24H  furosemide, 40 mg, Oral, BID  guaiFENesin, 600 mg, Oral, BID  magnesium oxide, 400 mg, Oral, Daily  pantoprazole, 40 mg, Oral, QAM  penicillin g (potassium), 4 Million Units, Intravenous, Q4H  polyethylene glycol, 17 g, Oral, Daily  potassium chloride, 20 mEq, Oral, BID With Meals  potassium chloride, 10 mEq, Intravenous, Once   And  potassium chloride, 10 mEq, Intravenous, Once   And  potassium chloride, 10 mEq, Intravenous, Once   And  potassium chloride, 10 mEq, Intravenous, Once   And  potassium chloride, 10 mEq, Intravenous, Once  roflumilast, 500 mcg, Oral, Daily  rOPINIRole, 2 mg, Oral, Nightly  sertraline, 100 mg, Oral, Daily  tiotropium bromide monohydrate, 2 puff, Inhalation, Daily - RT      Allergies:  Bupropion, Cephalexin, and  "Metoprolol    Objective      Vital Signs  Temp:  [98 °F (36.7 °C)-98.3 °F (36.8 °C)] 98 °F (36.7 °C)  Heart Rate:  [59-72] 72  Resp:  [16-18] 18  BP: ()/(41-66) 154/66    Flowsheet Rows        First Filed Value   Admission Height  170.2 cm (67\") Documented at 07/02/2021 1349   Admission Weight  59 kg (130 lb) Documented at 07/02/2021 1613          170.2 cm (67\")    Physical Exam  Constitutional:       General: She is not in acute distress.  HENT:      Head: Normocephalic and atraumatic.      Mouth/Throat:      Mouth: Mucous membranes are dry.   Cardiovascular:      Rate and Rhythm: Normal rate and regular rhythm.      Heart sounds: Murmur heard.     Pulmonary:      Effort: Pulmonary effort is normal. No respiratory distress.   Abdominal:      General: Bowel sounds are normal. There is no distension.      Tenderness: There is no abdominal tenderness.   Skin:     General: Skin is warm and dry.   Neurological:      General: No focal deficit present.      Mental Status: She is alert.   Psychiatric:         Mood and Affect: Mood normal.         Thought Content: Thought content normal.         Judgment: Judgment normal.         Results Review:   Lab Results (last 24 hours)       Procedure Component Value Units Date/Time    POC Glucose Once [[id number]]  (Normal) Collected: 07/06/21 1105    Specimen: Blood Updated: 07/06/21 1108     Glucose 108 mg/dL      Comment: Meter: [id number] : [id number] [place] Yudelka NA       Blood Culture - Blood, Arm, Left [[id number]] Collected: 07/03/21 0506    Specimen: Blood from Arm, Left Updated: 07/06/21 0601     Blood Culture No growth at 3 days    Blood Culture - Blood, Arm, Right [[id number]] Collected: 07/03/21 0513    Specimen: Blood from Arm, Right Updated: 07/06/21 0601     Blood Culture No growth at 3 days    COVID PRE-OP / PRE-PROCEDURE SCREENING ORDER (NO ISOLATION) - Swab, Nasopharynx [[id number]]  (Normal) Collected: 07/05/21 1737    Specimen: Swab from Nasopharynx " Updated: 07/05/21 2238    Narrative:      The following orders were created for panel order COVID PRE-OP / PRE-PROCEDURE SCREENING ORDER (NO ISOLATION) - Swab, Nasopharynx.  Procedure                               Abnormality         Status                     ---------                               -----------         ------                     COVID-19,APTIMA PANTHER,...[[id number]]  Normal              Final result                 Please view results for these tests on the individual orders.    COVID-19,APTIMA PANTHER,[facility] IN-HOUSE, NP/OP SWAB IN UTM/VTM/SALINE TRANSPORT MEDIA,24 HR TAT - Swab, Nasopharynx [[id number]]  (Normal) Collected: 07/05/21 1737    Specimen: Swab from Nasopharynx Updated: 07/05/21 2238     COVID19 Not Detected    Narrative:      Fact sheet for providers: https://www.fda.gov/media/266810/download     Fact sheet for patients: https://www.fda.gov/media/353310/download    Test performed by RT PCR.                Assessment/Plan       Bacteremia    PAF (paroxysmal atrial fibrillation) (CMS/[facility])    Pulmonary hypertension (CMS/[facility])    S/P TVR (tricuspid valve repair)    History of mitral valve replacement with tissue graft    Chronic respiratory failure with hypoxia (CMS/[facility])    COPD (chronic obstructive pulmonary disease) (CMS/[facility])    Septicemia (CMS/[facility])    Headache      Assessment & Plan    - s/p MVR(tissue)/TVr/MAZE/ALEJANDRA ligation in 2014 (Dr. Polanco)  - recurrent strep bacteremia, concern for Mitral endocarditis/paravalvular regurg  - PHTN  - chronic hypoxic respiratory failure, COPD on home oxygen  - PAF s/p MAZE     Patient seen with Dr. Polanco. After reviewing the CHIO he recommends medical management at this time. Abx per ID.    Thank you for allowing us to participate in the care of this patient.      Jose Daniel Hicks PA-C  07/06/21  13:13 EDT  Addendum  Patient was seen and examined by me, I reviewed the records, I reviewed and interpreted the studies including the echocardiogram and CHIO and  labs and discussed the findings with the patient.  I am not sure that there is prostatic endocarditis but definitely there is an infection source and bacteria in the blood.  I recommend aggressive antibiotic therapy probably 4 to 6 weeks IV antibiotics and reevaluation with another echocardiogram.  I do not see a need for surgical intervention at this point.  Homar Polanco MD

## 2021-07-06 NOTE — ANESTHESIA PREPROCEDURE EVALUATION
Anesthesia Evaluation     NPO Solid Status: > 8 hours  NPO Liquid Status: > 2 hours           Airway   Mallampati: II  no difficulty expected  Dental    (+) edentulous    Pulmonary - normal exam   (+) pneumonia , a smoker Former, COPD, asthma,home oxygen,     ROS comment: Chronic hypoxic respiratory failure  PE comment: nonlabored  Cardiovascular - normal exam    Rhythm: regular  Rate: normal    (+) hypertension, valvular problems/murmurs (s/p TV Repair) MR, dysrhythmias Atrial Flutter, Paroxysmal Atrial Fib, PVD, hyperlipidemia,     ROS comment: PulmHTN    Neuro/Psych  (+) headaches, dizziness/light headedness,     GI/Hepatic/Renal/Endo    (+)  hiatal hernia, GERD, GI bleeding , hepatitis B, liver disease (hepatitis), renal disease (VAN),     Musculoskeletal     (+) arthralgias,   Abdominal    Substance History      OB/GYN          Other   arthritis,        Other Comment: Septicemia                  Anesthesia Plan    ASA 3     MAC       Anesthetic plan, all risks, benefits, and alternatives have been provided, discussed and informed consent has been obtained with: patient.

## 2021-07-06 NOTE — ANESTHESIA POSTPROCEDURE EVALUATION
"Patient: Paz Browne    Procedure Summary     Date: 07/06/21 Room / Location: [facility] PACU    Anesthesia Start: 0753 Anesthesia Stop: 0825    Procedure: ADULT TRANSESOPHAGEAL ECHO (CHIO) W/ CONT IF NECESSARY PER PROTOCOL Diagnosis: (Endocarditis)    Scheduled Providers: Rodolfo Null MD Provider: Serjio Pineda MD    Anesthesia Type: MAC ASA Status: 3          Anesthesia Type: MAC    Vitals  Vitals Value Taken Time   /47 07/06/21 0830   Temp     Pulse 65 07/06/21 0846   Resp 16 07/06/21 0830   SpO2 100 % 07/06/21 0846   Vitals shown include unvalidated device data.        Post Anesthesia Care and Evaluation    Patient location during evaluation: bedside  Patient participation: complete - patient participated  Level of consciousness: awake  Pain management: adequate  Airway patency: patent  Anesthetic complications: No anesthetic complications    Cardiovascular status: acceptable  Respiratory status: acceptable  Hydration status: acceptable    Comments: */47   Pulse 62   Temp 36.7 °C (98 °F) (Oral)   Resp 16   Ht 170.2 cm (67\")   Wt 60 kg (132 lb 4.4 oz)   SpO2 99%   BMI 20.72 kg/m²         "

## 2021-07-06 NOTE — PLAN OF CARE
Goal Outcome Evaluation:  Plan of Care Reviewed With: patient        Progress: improving  Outcome Summary: plan for CHIO today. NPO since midnight. VSS. on 1 L o2 now. fiorcet given for headache. norco given for chronic pain. will ctm.

## 2021-07-06 NOTE — SIGNIFICANT NOTE
07/06/21 160   OTHER   Discipline physical therapy assistant   Rehab Time/Intention   Session Not Performed other (see comments)  (pt refused earlier this AM after returning back from CHIO; pt states she has been ambulating and is going home today; PT will f/u tomorrow if still here)   Recommendation   PT - Next Appointment 07/07/21

## 2021-07-06 NOTE — PROGRESS NOTES
[facility]  Progress note: 2021    Patient Identification:  Name:Paz Browne  Age:66 y.o.  Sex: female  :  1955  MRN: [id number]           CC:  Bacteremia    Interval history:  For CHIO today.  Vitals stable overnight.  Dyspnea improved no chest pain.  CHIO performed showed normal LV systolic function normal placement of the bio prosthetic mitral valve.  There is mild periprosthetic regurgitation around the posterior annulus and suspicious for possible early abscess formation though cannot see irregular borders.  The valve itself had very small echo bright structures on the atrial surface that were difficult to visualize even by 3D imaging.  However they were repeatedly visualized and suspicious for probable small vegetations of the posterior leaflet.  There was trivial valvular regurgitation associated with this.  The aortic valve was sclerotic without significant debris.  There is trivial aortic regurgitation.  The tricuspid valve was not well seen.  It was moderately regurgitant.  TR signal was inadequate tach versus RV systolic pressure.  There is no atrial shunt.  Left atrial appendage was ligated.  There was grade 3/f 6 atheromatous disease of the aortic arch and descending thoracic aorta.    Vital Signs:   Temp:  [97.6 °F (36.4 °C)-98.3 °F (36.8 °C)] 98 °F (36.7 °C)  Heart Rate:  [67-69] 69  Resp:  [16-18] 18  BP: (113-129)/(52-59) 113/52    Intake/Output Summary (Last 24 hours) at 2021 0719  Last data filed at 2021 0218  Gross per 24 hour   Intake 160 ml   Output 800 ml   Net -640 ml       Physical Examination:    General Appearance No acute distress   Neck No adenopathy, supple, trachea midline, no thyromegaly, no carotid bruit, no JVD   Lungs Clear to auscultation,respirations regular, even and unlabored   Heart Regular rhythm and normal rate, normal S1 and S2, 2/6 murmur, no gallop, no rub, no click   Chest wall No abnormalities observed   Abdomen Normal bowel sounds, no  masses, no hepatomegaly, soft   Extremities Moves all extremities well, no edema, no cyanosis, no redness   Neurological Alert and oriented x 3     Lab Review:  Personally reviewed the labs, radiology imaging and other cardiac procedures.   Results from last 7 days   Lab Units 07/03/21  0510 07/02/21  1516   SODIUM mmol/L 138 139   POTASSIUM mmol/L 3.9 2.7*   CHLORIDE mmol/L 97* 94*   CO2 mmol/L 32.9* 33.8*   BUN mg/dL 9 13   CREATININE mg/dL 0.69 0.79   CALCIUM mg/dL 8.9 9.0   BILIRUBIN mg/dL  --  1.0   ALK PHOS U/L  --  72   ALT (SGPT) U/L  --  18   AST (SGOT) U/L  --  20   GLUCOSE mg/dL 105* 117*         Results from last 7 days   Lab Units 07/03/21  0510 07/02/21  1516   WBC 10*3/mm3 7.50 9.69   HEMOGLOBIN g/dL 11.5* 12.1   HEMATOCRIT % 34.9 36.9   PLATELETS 10*3/mm3 155 147         Medication Review:   Meds reviewed  Scheduled Meds:amLODIPine, 10 mg, Oral, Daily  aspirin, 81 mg, Oral, Daily  atorvastatin, 40 mg, Oral, Daily  budesonide-formoterol, 2 puff, Inhalation, BID - RT  carvedilol, 6.25 mg, Oral, BID With Meals  cefTRIAXone, 2 g, Intravenous, Q24H  cetirizine, 10 mg, Oral, Daily  cyclobenzaprine, 10 mg, Oral, Nightly  enoxaparin, 40 mg, Subcutaneous, Q24H  furosemide, 40 mg, Oral, BID  guaiFENesin, 600 mg, Oral, BID  magnesium oxide, 400 mg, Oral, Daily  pantoprazole, 40 mg, Oral, QAM  polyethylene glycol, 17 g, Oral, Daily  potassium chloride, 20 mEq, Oral, BID With Meals  potassium chloride, 10 mEq, Intravenous, Once   And  potassium chloride, 10 mEq, Intravenous, Once   And  potassium chloride, 10 mEq, Intravenous, Once   And  potassium chloride, 10 mEq, Intravenous, Once   And  potassium chloride, 10 mEq, Intravenous, Once  roflumilast, 500 mcg, Oral, Daily  rOPINIRole, 2 mg, Oral, Nightly  sertraline, 100 mg, Oral, Daily  tiotropium bromide monohydrate, 2 puff, Inhalation, Daily - RT      Continuous Infusions:O2, 3 L/min, Last Rate: 3 L/min (07/02/21 2039)      I personally viewed and interpreted  the patient's EKG/Telemetry data    Assessment and Plan  1.  Recurrent strep bacteremia.  CHIO suspicious for endocarditis and perivalvular leak.  2.  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  Would proceed with 6 weeks of antibiotic therapy and ask CV surgery to review though likely will follow for now.  Regurgitation is paravalvular but small.  3.  History of severe pulmonary hypertension, RSVP 82 mmHg on recent CHIO 6/15/2021  4.  Chronic hypoxic respiratory failure/COPD  5.  Paroxysmal atrial fibrillation with prior appendage closure.  Remains in sinus rhythm.  No anticoagulation with history of GI bleed  6.  Coronavirus (not COVID-19) infection.    Discussed with patient and .    Ivette Galeano  7/6/202107:19 EDT  35min spent in reviewing records, discussion and examination of the patient and discussion with other members of the patient's medical team.     Dictated utilizing Dragon dictation

## 2021-07-06 NOTE — PROGRESS NOTES
Discharge Planning Assessment  [facility]     Patient Name: Paz Browne  MRN: [id number]  Today's Date: 7/6/2021    Admit Date: 7/2/2021    Discharge Needs Assessment     Row Name 07/06/21 7931       Living Environment    Lives With  spouse    Current Living Arrangements  home/apartment/condo    Primary Care Provided by  self    Family Caregiver if Needed  spouse    Family Caregiver Names  spouse, Chapincito Browne [phone]       Resource/Environmental Concerns    Resource/Environmental Concerns  none    Transportation Concerns  car, none       Transition Planning    Patient/Family Anticipates Transition to  home with family    Patient/Family Anticipated Services at Transition  home health care    Transportation Anticipated  family or friend will provide       Discharge Needs Assessment    Readmission Within the Last 30 Days  no previous admission in last 30 days    Equipment Currently Used at Home  oxygen;nebulizer [employer]    Discharge Facility/Level of Care Needs  home with home health    Provided Post Acute Provider List?  N/A    Provided Post Acute Provider Quality & Resource List?  Refused    Patient's Choice of Community Agency(s)  patient prefers a referral for [facility] at [facility] and states she has used them before        Discharge Plan     Row Name 07/06/21 8958       Plan    Plan  Home with [facility] and [facility]    Plan Comments  Facesheet information was verified with the patient at bedside.  Her IMM is documented.  Patient states she lives in a house with her spoue, Chapincito Browne [phone]. She has home oxygen and a nebulizer from [employer].  She has no SNU history.  She has used [facility] in the past and prefers a referral for [facility] and [facility].  Per ID notes plans for home ABX at NH.  She states her spouse will transport at NH.  She verified her pharmacy and her PCP.  She denies other needs at this time......................Jennie Osorio RN        Continued Care and Services -  Admitted Since 7/2/2021     Dialysis/Infusion     Service Provider Request Status Selected Services Address Phone Fax Patient Preferred    [facility]  Pending - Request Sent N/A [address] [place] [id number] [phone] --          Home Medical Care     Service Provider Request Status Selected Services Address Phone Fax Patient Preferred    [facility]   Selected Home Health Services [address] [place] [address] [address] [phone] --                Demographic Summary     Row Name 07/06/21 1730       General Information    Admission Type  inpatient    Arrived From  home    Required Notices Provided  Important Message from Medicare    Referral Source  admission list    Preferred Language  English       Contact Information    Permission Granted to Share Info With  ;family/designee    Contact Information Comments  spouse, Chapincito Browne [phone]        Functional Status     Row Name 07/06/21 1738       Functional Status    Usual Activity Tolerance  moderate    Current Activity Tolerance  fair       Functional Status, IADL    Medications  independent    Meal Preparation  independent    Housekeeping  independent    Laundry  independent    Shopping  assistive person       Mental Status    General Appearance WDL  WDL       Mental Status Summary    Recent Changes in Mental Status/Cognitive Functioning  no changes       Employment/    Employment Status  retired        Psychosocial    No documentation.       Abuse/Neglect    No documentation.       Legal    No documentation.       Substance Abuse    No documentation.       Patient Forms    No documentation.           Jennie Osorio RN

## 2021-07-06 NOTE — PLAN OF CARE
Problem: Adult Inpatient Plan of Care  Goal: Plan of Care Review  Outcome: Ongoing, Progressing  Flowsheets (Taken 7/6/2021 1745)  Progress: improving  Plan of Care Reviewed With: patient  Outcome Summary:   1-2l pnc   PICC line ordered   held lovenox for PICC placement., Consents obtained & on chart  Goal: Patient-Specific Goal (Individualized)  Outcome: Ongoing, Progressing  Goal: Absence of Hospital-Acquired Illness or Injury  Outcome: Ongoing, Progressing  Intervention: Identify and Manage Fall Risk  Recent Flowsheet Documentation  Taken 7/6/2021 1442 by Angélica Peña RN  Safety Promotion/Fall Prevention: safety round/check completed  Taken 7/6/2021 1032 by Angélica Peña RN  Safety Promotion/Fall Prevention: safety round/check completed  Intervention: Prevent Skin Injury  Recent Flowsheet Documentation  Taken 7/6/2021 1442 by Angélica Peña RN  Body Position: position maintained  Taken 7/6/2021 1032 by Angélica Peña RN  Body Position: position maintained  Skin Protection: pulse oximeter probe site changed  Intervention: Prevent and Manage VTE (venous thromboembolism) Risk  Recent Flowsheet Documentation  Taken 7/6/2021 1032 by Angélica Peña RN  VTE Prevention/Management:   bilateral   dorsiflexion/plantar flexion performed  Intervention: Prevent Infection  Recent Flowsheet Documentation  Taken 7/6/2021 1442 by Angélica Peña RN  Infection Prevention: hand hygiene promoted  Taken 7/6/2021 1032 by Angélica Peña RN  Infection Prevention: hand hygiene promoted  Goal: Optimal Comfort and Wellbeing  Outcome: Ongoing, Progressing  Goal: Readiness for Transition of Care  Outcome: Ongoing, Progressing   Goal Outcome Evaluation:  Plan of Care Reviewed With: patient        Progress: improving  Outcome Summary: 1-2l pnc; PICC line ordered; held lovenox for PICC placement., Consents obtained & on chart

## 2021-07-06 NOTE — PROGRESS NOTES
LOS: 4 days     Chief Complaint: Recurrent Strep bacteremia with history of MVR    Interval History: Pt seen in PACU. She is just waking up from anesthesia. CHIO results are pending. No fevers.     ROS: cannot obtain due to mental status from anesthesia    Vital Signs  Temp:  [98 °F (36.7 °C)-98.3 °F (36.8 °C)] 98 °F (36.7 °C)  Heart Rate:  [59-69] 62  Resp:  [16-18] 16  BP: ()/(41-58) 106/47    Physical Exam:  General: sedated  Cardiovascular: NR  Respiratory: mild rales at the bases bilaterally; no wheezing  GI: Soft, ND  Skin: No rashes     Antibiotics:  Ceftriaxone 2 g IV every 24 hours     Results Review:    Micro reviewed today  Lab Results   Component Value Date    WBC 7.50 07/03/2021    HGB 11.5 (L) 07/03/2021    HCT 34.9 07/03/2021    MCV 87.7 07/03/2021     07/03/2021     Lab Results   Component Value Date    GLUCOSE 105 (H) 07/03/2021    BUN 9 07/03/2021    CREATININE 0.69 07/03/2021    EGFRIFNONA 85 07/03/2021    BCR 13.0 07/03/2021    CO2 32.9 (H) 07/03/2021    CALCIUM 8.9 07/03/2021    ALBUMIN 4.20 07/02/2021    AST 20 07/02/2021    ALT 18 07/02/2021       Microbiology:  6/14 RPP: + coronavirus NL63  6/14 BCx: Streptococcus gallolyticus   6/16 BCx: negative  7/1 BCx: Streptococcus gallolyticus (sensitive to penicillin, ceftriaxone, vanco)  7/2 BCx Streptococcus gallolyticus   7/2 COVID negative  7/3 BCx: NGTD  7/5 COVID: negative  7/5 BCx: pending    New Radiology:  CHIO results pending    Assessment/Plan   1. Strep gallolyticus septicemia  2. Chronic hypoxic respiratory failure (3L NC at home)  3. Mitral valve replaced (8/2014)  4. Tricuspid valve repair (8/2014)    CHIO results are pending.     A correction to my prior note is that she did have a colonoscopy in May 2021.     From an antibiotics standpoint, change ceftriaxone to penicillin G 4 million units IV q4h. Repeat BCx 7/3 and 7/5 are negative to date. I am planning a 6-weeks course.     ID will follow.     ADDENDUM:  D/W Dr Galeano re:  CHIO findings. Concerned for infection. Will consult CV surgery.

## 2021-07-07 ENCOUNTER — READMISSION MANAGEMENT (OUTPATIENT)
Dept: CALL CENTER | Facility: HOSPITAL | Age: 66
End: 2021-07-07

## 2021-07-07 ENCOUNTER — HOME HEALTH ADMISSION (OUTPATIENT)
Dept: HOME HEALTH SERVICES | Facility: HOME HEALTHCARE | Age: 66
End: 2021-07-07

## 2021-07-07 VITALS
DIASTOLIC BLOOD PRESSURE: 58 MMHG | BODY MASS INDEX: 21.8 KG/M2 | TEMPERATURE: 97.9 F | RESPIRATION RATE: 18 BRPM | SYSTOLIC BLOOD PRESSURE: 111 MMHG | WEIGHT: 138.89 LBS | HEIGHT: 67 IN | OXYGEN SATURATION: 94 % | HEART RATE: 68 BPM

## 2021-07-07 PROBLEM — I33.0 ACUTE BACTERIAL ENDOCARDITIS: Status: ACTIVE | Noted: 2021-07-07

## 2021-07-07 LAB
GLUCOSE BLDC GLUCOMTR-MCNC: 105 MG/DL (ref 70–130)
GLUCOSE BLDC GLUCOMTR-MCNC: 122 MG/DL (ref 70–130)

## 2021-07-07 PROCEDURE — 99232 SBSQ HOSP IP/OBS MODERATE 35: CPT | Performed by: INTERNAL MEDICINE

## 2021-07-07 PROCEDURE — 94799 UNLISTED PULMONARY SVC/PX: CPT

## 2021-07-07 PROCEDURE — 82962 GLUCOSE BLOOD TEST: CPT

## 2021-07-07 PROCEDURE — 63710000001 ONDANSETRON PER 8 MG: Performed by: INTERNAL MEDICINE

## 2021-07-07 PROCEDURE — C1751 CATH, INF, PER/CENT/MIDLINE: HCPCS

## 2021-07-07 PROCEDURE — 25010000003 PENICILLIN G POTASSIUM PER 600000 UNITS: Performed by: INTERNAL MEDICINE

## 2021-07-07 PROCEDURE — 05HY33Z INSERTION OF INFUSION DEVICE INTO UPPER VEIN, PERCUTANEOUS APPROACH: ICD-10-PCS | Performed by: INTERNAL MEDICINE

## 2021-07-07 PROCEDURE — 25010000002 HYDROMORPHONE PER 4 MG: Performed by: NURSE ANESTHETIST, CERTIFIED REGISTERED

## 2021-07-07 RX ORDER — SODIUM CHLORIDE 0.9 % (FLUSH) 0.9 %
10 SYRINGE (ML) INJECTION EVERY 12 HOURS SCHEDULED
Status: DISCONTINUED | OUTPATIENT
Start: 2021-07-07 | End: 2021-07-07 | Stop reason: HOSPADM

## 2021-07-07 RX ORDER — SODIUM CHLORIDE 0.9 % (FLUSH) 0.9 %
20 SYRINGE (ML) INJECTION AS NEEDED
Status: DISCONTINUED | OUTPATIENT
Start: 2021-07-07 | End: 2021-07-07 | Stop reason: HOSPADM

## 2021-07-07 RX ORDER — BUTALBITAL, ACETAMINOPHEN AND CAFFEINE 50; 325; 40 MG/1; MG/1; MG/1
1 TABLET ORAL EVERY 6 HOURS PRN
Qty: 4 TABLET | Refills: 0 | Status: SHIPPED | OUTPATIENT
Start: 2021-07-07

## 2021-07-07 RX ORDER — SODIUM CHLORIDE 0.9 % (FLUSH) 0.9 %
10 SYRINGE (ML) INJECTION AS NEEDED
Status: DISCONTINUED | OUTPATIENT
Start: 2021-07-07 | End: 2021-07-07 | Stop reason: HOSPADM

## 2021-07-07 RX ADMIN — SODIUM CHLORIDE 4 MILLION UNITS: 9 INJECTION, SOLUTION INTRAVENOUS at 15:19

## 2021-07-07 RX ADMIN — HYDROMORPHONE HYDROCHLORIDE 0.5 MG: 1 INJECTION, SOLUTION INTRAMUSCULAR; INTRAVENOUS; SUBCUTANEOUS at 11:10

## 2021-07-07 RX ADMIN — ONDANSETRON HYDROCHLORIDE 4 MG: 4 TABLET, FILM COATED ORAL at 08:47

## 2021-07-07 RX ADMIN — POLYETHYLENE GLYCOL 3350 17 G: 17 POWDER, FOR SOLUTION ORAL at 08:47

## 2021-07-07 RX ADMIN — TIOTROPIUM BROMIDE INHALATION SPRAY 2 PUFF: 3.12 SPRAY, METERED RESPIRATORY (INHALATION) at 08:05

## 2021-07-07 RX ADMIN — PANTOPRAZOLE SODIUM 40 MG: 40 TABLET, DELAYED RELEASE ORAL at 06:03

## 2021-07-07 RX ADMIN — CARVEDILOL 6.25 MG: 6.25 TABLET, FILM COATED ORAL at 08:47

## 2021-07-07 RX ADMIN — GUAIFENESIN 600 MG: 600 TABLET, EXTENDED RELEASE ORAL at 08:46

## 2021-07-07 RX ADMIN — MAGNESIUM OXIDE 400 MG (241.3 MG MAGNESIUM) TABLET 400 MG: TABLET at 08:47

## 2021-07-07 RX ADMIN — POTASSIUM CHLORIDE 20 MEQ: 750 TABLET, EXTENDED RELEASE ORAL at 08:47

## 2021-07-07 RX ADMIN — ROFLUMILAST 500 MCG: 500 TABLET ORAL at 08:47

## 2021-07-07 RX ADMIN — ASPIRIN 81 MG: 81 TABLET, COATED ORAL at 08:47

## 2021-07-07 RX ADMIN — ATORVASTATIN CALCIUM 40 MG: 20 TABLET, FILM COATED ORAL at 08:46

## 2021-07-07 RX ADMIN — HYDROCODONE BITARTRATE AND ACETAMINOPHEN 1 TABLET: 5; 325 TABLET ORAL at 04:38

## 2021-07-07 RX ADMIN — FUROSEMIDE 40 MG: 40 TABLET ORAL at 08:47

## 2021-07-07 RX ADMIN — OXYCODONE AND ACETAMINOPHEN 1 TABLET: 325; 10 TABLET ORAL at 08:47

## 2021-07-07 RX ADMIN — SODIUM CHLORIDE 4 MILLION UNITS: 9 INJECTION, SOLUTION INTRAVENOUS at 04:51

## 2021-07-07 RX ADMIN — SERTRALINE 100 MG: 100 TABLET, FILM COATED ORAL at 08:47

## 2021-07-07 RX ADMIN — SODIUM CHLORIDE 4 MILLION UNITS: 9 INJECTION, SOLUTION INTRAVENOUS at 08:48

## 2021-07-07 RX ADMIN — BUDESONIDE AND FORMOTEROL FUMARATE DIHYDRATE 2 PUFF: 160; 4.5 AEROSOL RESPIRATORY (INHALATION) at 08:05

## 2021-07-07 RX ADMIN — AMLODIPINE BESYLATE 10 MG: 10 TABLET ORAL at 08:46

## 2021-07-07 RX ADMIN — CETIRIZINE HYDROCHLORIDE ALLERGY 10 MG: 10 TABLET ORAL at 08:47

## 2021-07-07 NOTE — PROGRESS NOTES
Case Management Discharge Note      Final Note: Home with [facility] and [facility]    Provided Post Acute Provider List?: N/A  Provided Post Acute Provider Quality & Resource List?: Refused    Selected Continued Care - Admitted Since 7/2/2021     Destination    No services have been selected for the patient.              Durable Medical Equipment    No services have been selected for the patient.              Dialysis/Infusion     Service Provider Selected Services Address Phone Fax Patient Preferred    [facility]  Infusion and IV Therapy [address], [place] [id number] [phone] --          Home Medical Care Coordination complete    Service Provider Selected Services Address Phone Fax Patient Preferred    [facility]  [facility] Health [facility] [address] [address] [phone] --          Therapy    No services have been selected for the patient.              Community Resources    No services have been selected for the patient.              Community & DME    No services have been selected for the patient.                  Transportation Services  Private: Car    Final Discharge Disposition Code: 06 - home with home health care

## 2021-07-07 NOTE — SIGNIFICANT NOTE
07/07/21 1219   PICC Single Lumen 07/07/21 Left Basilic   Placement Date/Time: 07/07/21 1207   Hand Hygiene Completed: Yes  Size (Fr): 4  Description (optional): power picc, lot no [id number], exp 07-  Length (cm): 40 cm  Orientation: Left  Location: Basilic  Site Prep: Chlorhexidine isopropyl alcohol  ...   Site Assessment Clean;Dry;Intact   #1 Lumen Status Blood return noted;Capped;Flushed;Normal saline locked   Length summer (cm) 40 cm   Line Care Connections checked and tightened   Extremity Circumference (cm) 31 cm   Dressing Type Border Dressing;Securing device;Antimicrobial dressing/disc   Dressing Status Clean;Dry;Intact   Dressing Intervention New dressing   Liquid Adhesive Applied   Dressing Change Due 07/14/21   PICC Line tip in SVC per 3cg technology   no abdominal pain, no bloating, no constipation, no diarrhea, no nausea and no vomiting.

## 2021-07-07 NOTE — PLAN OF CARE
Goal Outcome Evaluation:  Plan of Care Reviewed With: patient        Progress: improving       IV abx. Alert and oriented. Vital signs stable on 1.5 L nasal cannula. Spoke with IV therapy nurse. This RN was informed that the patient would not be able to get PICC line placed tonight because the PICC line consent form needs to be signed at least 24 hours out from when the patient had anesthesia. Will pass along to day shift.   Problem: Adult Inpatient Plan of Care  Goal: Plan of Care Review  Outcome: Ongoing, Progressing  Flowsheets (Taken 7/7/2021 0405)  Progress: improving  Plan of Care Reviewed With: patient  Goal: Patient-Specific Goal (Individualized)  Outcome: Ongoing, Progressing  Goal: Absence of Hospital-Acquired Illness or Injury  Outcome: Ongoing, Progressing  Intervention: Identify and Manage Fall Risk  Recent Flowsheet Documentation  Taken 7/7/2021 0239 by Kandis Duffy, RN  Safety Promotion/Fall Prevention:   activity supervised   clutter free environment maintained   fall prevention program maintained   nonskid shoes/slippers when out of bed   safety round/check completed   room organization consistent  Taken 7/7/2021 0023 by Kandis Duffy, RN  Safety Promotion/Fall Prevention:   activity supervised   clutter free environment maintained   fall prevention program maintained   nonskid shoes/slippers when out of bed   safety round/check completed   room organization consistent  Taken 7/6/2021 2234 by Kandis Duffy, RN  Safety Promotion/Fall Prevention:   activity supervised   clutter free environment maintained   fall prevention program maintained   nonskid shoes/slippers when out of bed   room organization consistent   safety round/check completed  Taken 7/6/2021 2020 by Kandis Duffy, RN  Safety Promotion/Fall Prevention:   activity supervised   clutter free environment maintained   fall prevention program maintained   nonskid shoes/slippers when out of bed   room organization consistent   safety round/check  completed  Intervention: Prevent Skin Injury  Recent Flowsheet Documentation  Taken 7/7/2021 0239 by Kandis Duffy RN  Body Position:   position changed independently   tilted, left  Taken 7/7/2021 0023 by Kandis Duffy RN  Body Position:   position changed independently   supine  Taken 7/6/2021 2234 by Kandis Duffy RN  Body Position:   position changed independently   side-lying, left  Taken 7/6/2021 2020 by Kandis Duffy RN  Body Position:   position changed independently   supine  Intervention: Prevent and Manage VTE (venous thromboembolism) Risk  Recent Flowsheet Documentation  Taken 7/7/2021 0023 by Kandis Duffy RN  VTE Prevention/Management:   bilateral   dorsiflexion/plantar flexion performed   bleeding risk factor(s) identified  Taken 7/6/2021 2020 by Kandis Duffy RN  VTE Prevention/Management:   bilateral   dorsiflexion/plantar flexion performed   bleeding risk factor(s) identified  Intervention: Prevent Infection  Recent Flowsheet Documentation  Taken 7/7/2021 0239 by Kandis Duffy RN  Infection Prevention:   hand hygiene promoted   personal protective equipment utilized   rest/sleep promoted   single patient room provided   visitors restricted/screened  Taken 7/7/2021 0023 by Kandis Duffy RN  Infection Prevention:   hand hygiene promoted   personal protective equipment utilized   single patient room provided   rest/sleep promoted   visitors restricted/screened  Taken 7/6/2021 2234 by Kandis Duffy RN  Infection Prevention:   hand hygiene promoted   personal protective equipment utilized   rest/sleep promoted   single patient room provided   visitors restricted/screened  Taken 7/6/2021 2020 by Kandis Duffy RN  Infection Prevention:   personal protective equipment utilized   hand hygiene promoted   rest/sleep promoted   single patient room provided   visitors restricted/screened  Goal: Optimal Comfort and Wellbeing  Outcome: Ongoing, Progressing  Intervention: Provide Person-Centered Care  Recent  Flowsheet Documentation  Taken 7/7/2021 0023 by Kandis Duffy RN  Trust Relationship/Rapport:   care explained   choices provided  Taken 7/6/2021 2020 by Kandis Duffy RN  Trust Relationship/Rapport:   care explained   choices provided  Goal: Readiness for Transition of Care  Outcome: Ongoing, Progressing     Problem: Fall Injury Risk  Goal: Absence of Fall and Fall-Related Injury  Outcome: Ongoing, Progressing  Intervention: Identify and Manage Contributors to Fall Injury Risk  Recent Flowsheet Documentation  Taken 7/7/2021 0239 by Kandis Duffy RN  Medication Review/Management: medications reviewed  Taken 7/7/2021 0023 by Kandis Duffy RN  Medication Review/Management: medications reviewed  Taken 7/6/2021 2234 by Kandis Duffy RN  Medication Review/Management: medications reviewed  Taken 7/6/2021 2020 by Kandis Duffy RN  Medication Review/Management: medications reviewed  Intervention: Promote Injury-Free Environment  Recent Flowsheet Documentation  Taken 7/7/2021 0239 by Kandis Duffy RN  Safety Promotion/Fall Prevention:   activity supervised   clutter free environment maintained   fall prevention program maintained   nonskid shoes/slippers when out of bed   safety round/check completed   room organization consistent  Taken 7/7/2021 0023 by Kandis Duffy RN  Safety Promotion/Fall Prevention:   activity supervised   clutter free environment maintained   fall prevention program maintained   nonskid shoes/slippers when out of bed   safety round/check completed   room organization consistent  Taken 7/6/2021 2234 by Kandis Duffy RN  Safety Promotion/Fall Prevention:   activity supervised   clutter free environment maintained   fall prevention program maintained   nonskid shoes/slippers when out of bed   room organization consistent   safety round/check completed  Taken 7/6/2021 2020 by Kandis Duffy RN  Safety Promotion/Fall Prevention:   activity supervised   clutter free environment maintained   fall prevention  program maintained   nonskid shoes/slippers when out of bed   room organization consistent   safety round/check completed     Problem: Infection  Goal: Infection Symptom Resolution  Outcome: Ongoing, Progressing     Problem: Skin Injury Risk Increased  Goal: Skin Health and Integrity  Outcome: Ongoing, Progressing  Intervention: Optimize Skin Protection  Recent Flowsheet Documentation  Taken 7/7/2021 0239 by Kandis Duffy RN  Head of Bed (HOB): HOB at 20-30 degrees  Taken 7/7/2021 0023 by Kandis Duffy RN  Head of Bed (HOB): HOB at 20-30 degrees  Taken 7/6/2021 2234 by Kandis Duffy RN  Head of Bed (HOB): HOB at 20-30 degrees  Taken 7/6/2021 2020 by Kandis Duffy RN  Head of Bed (HOB): HOB at 20-30 degrees

## 2021-07-07 NOTE — PLAN OF CARE
Problem: Adult Inpatient Plan of Care  Goal: Plan of Care Review  Outcome: Ongoing, Progressing  Flowsheets (Taken 7/7/2021 1642)  Progress: improving  Plan of Care Reviewed With: patient  Outcome Summary: PICC placed, DC home with spouse. Per pt he was supposed to arrive on unit at 4pm, he is not here yet to transport her home. 1L pNC vss. continuing to monitor.  Goal: Patient-Specific Goal (Individualized)  Outcome: Ongoing, Progressing  Goal: Absence of Hospital-Acquired Illness or Injury  Outcome: Ongoing, Progressing  Goal: Optimal Comfort and Wellbeing  Outcome: Ongoing, Progressing  Goal: Readiness for Transition of Care  Outcome: Ongoing, Progressing   Goal Outcome Evaluation:  Plan of Care Reviewed With: patient        Progress: improving  Outcome Summary: PICC placed, DC home with spouse. Per pt he was supposed to arrive on unit at 4pm, he is not here yet to transport her home. 1L pNC vss. continuing to monitor.

## 2021-07-07 NOTE — DISCHARGE SUMMARY
NAME: Paz Browne ADMIT: 2021   : 1955  PCP: Javan Martinez MD    MRN: [id number] LOS: 5 days   AGE/SEX: 66 y.o. female  ROOM: [facility]     Date of Admission:  2021  Date of Discharge:  2021    PCP: Javan Martinez MD    CHIEF COMPLAINT  Abnormal Lab and Fever      DISCHARGE DIAGNOSIS  Active Hospital Problems    Diagnosis  POA   • **Acute bacterial endocarditis [I33.0]  Yes   • Headache [R51.9]  Yes   • Bacteremia [R78.81]  Yes   • Septicemia (CMS/[facility]) [A41.9]  Yes   • COPD (chronic obstructive pulmonary disease) (CMS/[facility]) [J44.9]  Yes   • Chronic respiratory failure with hypoxia (CMS/[facility]) [J96.11]  Yes   • History of mitral valve replacement with tissue graft [Z95.4]  Not Applicable   • S/P TVR (tricuspid valve repair) [Z98.890]  Not Applicable   • Pulmonary hypertension (CMS/[facility]) [I27.20]  Yes   • PAF (paroxysmal atrial fibrillation) (CMS/[facility]) [I48.0]  Yes      Resolved Hospital Problems   No resolved problems to display.       SECONDARY DIAGNOSES  Past Medical History:   Diagnosis Date   • VAN (acute kidney injury) (CMS/[facility])    • Anemia    • Asthma    • Atrial flutter (CMS/HCC)     cardioversion   • Cataract    • Celiac artery stenosis (CMS/HCC)    • Chronic respiratory failure with hypoxia (CMS/HCC)    • Colon polyp    • COPD (chronic obstructive pulmonary disease) (CMS/[facility])    • Emphysema of lung (CMS/[facility])    • GI bleed    • Hiatal hernia    • History of CHF (congestive heart failure)     due to MR   • History of home oxygen therapy     3 lpm NC   • History of mitral valve replacement with tissue graft    • Hyperlipidemia    • Hypertension    • Infectious viral hepatitis     B   • Intertrigo    • Long term (current) use of anticoagulants    • Mitral regurgitation     s/p tissue MVR   • PAF (paroxysmal atrial fibrillation) (CMS/HCC)     s/p MAZE   • Pneumonia    • Pulmonary hypertension (CMS/HCC)    • S/P TVR (tricuspid valve repair) 2016       CONSULTS    ID  Cardiology  CT Surgery    HOSPITAL COURSE  Patient is a 66 y.o. female with history of severe pulmonary hypertension, COPD with chronic respiratory failure, CHF, paroxysmal atrial fibrillation with prior appendage closure, not on any anticoagulation with history of GI bleed, previous mitral valve replacement as well as tricuspid valve repair.    She presents with recurrent strep bacteremia. She has history of tissue valve bioprosthesis and there was evidence for prior prosthetic mitral valve endocarditis on CHIO. She was seen by cardiothoracic surgery who did not feel that surgery was indicated.    She will be discharged on penicillin G 24 million units IV q24h via continuous infusion w/ stop date 8/17/21 at which time she will follow-up w/ Dr. Clayton in the ID clinic. At that visit,  will probably change her to oral suppression for 3 months. Weekly CBC w/ diff, BMP faxed to Dr. Clayton at [phone]. She feels well and will be discharged today.     DIAGNOSTICS    XR Chest 1 View [[id number]] Luis A as Reviewed   Order Status: Completed Collected: 07/02/21 1547    Updated: 07/02/21 1555   Narrative:     XR CHEST 1 VW-       HISTORY: Female who is 66 years-old,  infection       TECHNIQUE: Frontal view of the chest       COMPARISON: 06/14/2021       FINDINGS: The patient is rotated towards the right. The heart size is   normal. Aorta is calcified. Sternotomy wires are noted. Pulmonary   vasculature is unremarkable. Old granulomatous disease is present.   Emphysematous changes are present. Minimal likely atelectasis or   infiltrate left lower lung. No pleural effusion, or pneumothorax. No   acute osseous process.       Impression:     Minimal likely atelectasis or infiltrate left lower lung.      07/05/2021 1737 07/05/2021 2238 COVID PRE-OP / PRE-PROCEDURE SCREENING ORDER (NO ISOLATION) - Swab, Nasopharynx [[id number]]    Swab from Nasopharynx    Final result Component Value   No component results               07/05/2021 1737 07/05/2021 2238 COVID-19,[facility] IN-HOUSE, NP/OP SWAB IN UTM/VTM/[facility] TRANSPORT MEDIA,24 HR TAT - Swab, Nasopharynx [[id number]]    Swab from Nasopharynx    Final result Component Value   COVID19 Not Detected              07/05/2021 1455 07/06/2021 1515 Blood Culture - Blood, Arm, Right [[id number]]   Blood from Arm, Right    Preliminary result Component Value   Blood Culture No growth at 24 hours P              07/05/2021 1446 07/07/2021 0636 Blood Culture - Blood, Arm, Left [[id number]]   Blood from Arm, Left    Preliminary result Component Value   Blood Culture No growth at 2 days P              07/03/2021 0513 07/07/2021 0601 Blood Culture - Blood, Arm, Right [[id number]]   Blood from Arm, Right    Preliminary result Component Value   Blood Culture No growth at 4 days P              07/03/2021 0510 07/03/2021 0556 CBC (No Diff) [[id number]]   (Abnormal)   Blood    Final result Component Value Units   WBC 7.50 10*3/mm3   RBC 3.98 10*6/mm3   Hemoglobin 11.5Low  g/dL   Hematocrit 34.9 %   MCV 87.7 fL   MCH 28.9 pg   MCHC 33.0 g/dL   RDW 14.8 %   RDW-SD 46.9 fl   MPV 11.2 fL   Platelets 155 10*3/mm3           07/03/2021 0510 07/03/2021 0623 Basic Metabolic Panel [[id number]]    (Abnormal)   Blood    Final result Component Value Units   Glucose 105High  mg/dL   BUN 9 mg/dL   Creatinine 0.69 mg/dL   Sodium 138 mmol/L   Potassium 3.9 mmol/L   Chloride 97Low  mmol/L   CO2 32.9High  mmol/L   Calcium 8.9 mg/dL   eGFR Non African Am 85 mL/min/1.73   BUN/Creatinine Ratio 13.0         07/01/2021 1509 07/04/2021 0601 Blood Culture - Blood, Arm, Right [[id number]]    (Abnormal)   Blood from Arm, Right    Final result Component Value   Blood Culture Streptococcus gallolyticus ssp pasteurianusCritical    Isolated from Aerobic and Anaerobic Bottles   Gram Stain Anaerobic Bottle Gram positive cocci in chainsCritical     Aerobic Bottle Gram positive cocci in chainsCritical         Susceptibility    Streptococcus gallolyticus ssp pasteurianus (1)    Antibiotic Interpretation Method Status    Ceftriaxone Susceptible [place] Final    Penicillin G Susceptible [place] Final    Vancomycin Susceptible [place] Final                   PHYSICAL EXAM  Objective    Alert  nad  No resp distress  Chronically ill  Soft, nt    CONDITION ON DISCHARGE  Stable.      DISCHARGE DISPOSITION   Home or Self Care      DISCHARGE MEDICATIONS       Your medication list      START taking these medications      Instructions Last Dose Given Next Dose Due   butalbital-acetaminophen-caffeine -40 MG per tablet  Commonly known as: FIORICET, ESGIC      Take 1 tablet by mouth Every 6 (Six) Hours As Needed for Headache.       penicillin G potassium 4 Million Units in sodium chloride 0.9 % 100 mL IVPB      Infuse 4 Million Units into a venous catheter Every 4 (Four) Hours for 249 doses. Indications: Endocarditis          CHANGE how you take these medications      Instructions Last Dose Given Next Dose Due   albuterol sulfate  (90 Base) MCG/ACT inhaler  Commonly known as: PROVENTIL HFA;VENTOLIN HFA;PROAIR HFA  What changed: Another medication with the same name was changed. Make sure you understand how and when to take each.      Inhale 2 puffs Every 4 (Four) Hours As Needed for Wheezing.       albuterol (2.5 MG/3ML) 0.083% nebulizer solution  Commonly known as: PROVENTIL  What changed: See the new instructions.      INHALE 1 VIAL BY MOUTH EVERY 4 HOURS AS NEEDED FOR WHEEZING       furosemide 40 MG tablet  Commonly known as: LASIX  What changed: when to take this      Take 1 tablet by mouth Daily.       polyethylene glycol packet  Commonly known as: MIRALAX  What changed:   · when to take this  · reasons to take this      Take 17 g by mouth 2 (Two) Times a Day.       potassium chloride 10 MEQ CR tablet  What changed:   · how much to take  · how to take this  · when to take this      1 p.o. daily       rOPINIRole 2 MG  tablet  Commonly known as: REQUIP  What changed: See the new instructions.      TAKE 1 TABLET BY MOUTH EVERY DAY EVERY NIGHT          CONTINUE taking these medications      Instructions Last Dose Given Next Dose Due   amLODIPine 10 MG tablet  Commonly known as: NORVASC      TAKE 1 TABLET BY MOUTH EVERY DAY       aspirin 81 MG EC tablet      Take 1 tablet by mouth Daily.       atorvastatin 40 MG tablet  Commonly known as: LIPITOR      TAKE 1 TABLET BY MOUTH EVERY DAY       benzonatate 100 MG capsule  Commonly known as: TESSALON      TAKE 1 CAPSULE BY MOUTH 2 TIMES A DAY AS NEEDED FOR COUGH       carvedilol 6.25 MG tablet  Commonly known as: COREG      TAKE 1 TABLET BY MOUTH TWICE A DAY WITH MEALS       Claritin 10 MG tablet  Generic drug: loratadine      Take 10 mg by mouth 2 (two) times a day.       cyclobenzaprine 10 MG tablet  Commonly known as: FLEXERIL      TAKE 1 TABLET BY MOUTH AT BEDTIME       fish oil 1000 MG capsule capsule      Take  by mouth Every Night.       fluticasone-salmeterol 250-50 MCG/DOSE DISKUS  Commonly known as: Advair Diskus      Inhale 1 puff 2 (Two) Times a Day.       guaiFENesin 600 MG 12 hr tablet  Commonly known as: MUCINEX      Take 600 mg by mouth 2 (Two) Times a Day.       HYDROcodone-acetaminophen 5-325 MG per tablet  Commonly known as: NORCO      Take 1 tablet by mouth Every 8 (Eight) Hours As Needed for Moderate Pain . Chronic pain medicine for low back pain       magnesium oxide 400 MG tablet  Commonly known as: MAG-OX      TAKE 1 TABLET BY MOUTH EVERY DAY       meclizine 25 MG tablet  Commonly known as: ANTIVERT      Take 1 tablet by mouth 3 (Three) Times a Day As Needed for Dizziness. prn       Multivital tablet tablet  Generic drug: multivitamin with minerals      Take 1 tablet by mouth Daily.       Nebulizer device      1 Units 4 (Four) Times a Day As Needed (Wheezing). J44.1 j20.8       O2  Commonly known as: OXYGEN      Inhale 3 L/min Continuous.       omeprazole 40 MG  capsule  Commonly known as: priLOSEC      TAKE 1 CAPSULE BY MOUTH EVERY DAY       ondansetron 4 MG tablet  Commonly known as: ZOFRAN      Take 1 tablet by mouth Every 12 (Twelve) Hours As Needed for Nausea or Vomiting.       roflumilast 500 MCG tablet tablet  Commonly known as: DALIRESP      Take 1 tablet by mouth Daily.       sertraline 100 MG tablet  Commonly known as: ZOLOFT      TAKE 1 TABLET BY MOUTH EVERY DAY       tiotropium 18 MCG per inhalation capsule  Commonly known as: Spiriva HandiHaler      Place 1 capsule into inhaler and inhale Daily.       zaleplon 10 MG capsule  Commonly known as: SONATA      TAKE 1 CAPSULE BY MOUTH EVERY NIGHT          STOP taking these medications    predniSONE 10 MG tablet  Commonly known as: DELTASONE        valACYclovir 1000 MG tablet  Commonly known as: Valtrex              Where to Get Your Medications      These medications were sent to [facility]/pharmacy #[id number] - [place], [place] - [address] [facility] - [phone]  - [phone] [place] [address] [address] [id number]    Hours: 24-hours Phone: [phone]   · butalbital-acetaminophen-caffeine -40 MG per tablet     Information about where to get these medications is not yet available    Ask your nurse or doctor about these medications  · penicillin G potassium 4 Million Units in sodium chloride 0.9 % 100 mL IVPB          Future Appointments   Date Time Provider Department Center   7/8/2021  3:45 PM Javan Martinez MD [facility] PC [facility]   7/26/2021  6:00 PM KAJAL CT 2 BH [facility] CT [facility]   7/30/2021  4:00 PM LAB CHAIR 6 CBC [facility]  LAB [facility]   7/30/2021  4:40 PM Daniela Shin MD PhD [facility] CBC [facility]   8/17/2021 11:15 AM Loy Clayton MD [facility] ID [facility]   4/27/2022  9:00 AM Trish Randall MD [facility] CD L[facility]     Additional Instructions for the Follow-ups that You Need to Schedule     Ambulatory Referral to Home Health   As directed      penicillin G 24 million units IV q24h via  continuous infusion w/ stop date 8/17/21      Weekly CBC w/ diff, BMP faxed to Dr. Clayton at [phone].    Order Comments: penicillin G 24 million units IV q24h via continuous infusion w/ stop date 8/17/21   Weekly CBC w/ diff, BMP faxed to Dr. Clayton at [phone].     Face to Face Visit Date: 7/7/2021    Follow-up provider for Plan of Care?: I treated the patient in an acute care facility and will not continue treatment after discharge.    Follow-up provider: JAVAN LAINEZ [[id number]]    Reason/Clinical Findings: infective endocarditis, COPD, CHF    Describe mobility limitations that make leaving home difficult: infective endocarditis, COPD, CHF    Nursing/Therapeutic Services Requested: Skilled Nursing Physical Therapy    Skilled nursing orders: COPD management CHF management O2 instruction Infusion therapy PICC line care/instruction    PT orders: Therapeutic exercise Strengthening         Discharge Follow-up with Specialty: PCP in 2 weeks, Cardiology in 2 weeks, Infectious Disease Dr. Clayton 8/17/21   As directed      Specialty: PCP in 2 weeks, Cardiology in 2 weeks, Infectious Disease Dr. Clayton 8/17/21            Contact information for follow-up providers     Javan Lainez MD .    Specialty: Internal Medicine  Contact information:  [address] [facility] [address]  [place] [address]  [phone]                   Contact information for after-discharge care     Home Medical Care     [facility] .    Service: Home Health Services  Contact information:  [address] [facility] [address]  [facility] [address] [phone]                             TEST  RESULTS PENDING AT DISCHARGE  Pending Labs     Order Current Status    Blood Culture - Blood, Arm, Left Preliminary result    Blood Culture - Blood, Arm, Left Preliminary result    Blood Culture - Blood, Arm, Right Preliminary result    Blood Culture - Blood, Arm, Right Preliminary result             Sonny Burroughs,  MD [facility]  07/07/21  12:58 EDT      Time: greater than 32 minutes on discharge  It was a pleasure taking care of this patient while in the hospital.

## 2021-07-07 NOTE — PROGRESS NOTES
Continued Stay Note  [facility]     Patient Name: Paz Browne  MRN: [id number]  Today's Date: 7/7/2021    Admit Date: 7/2/2021    Discharge Plan     Row Name 07/07/21 1754       Plan    Plan  Home with [facility] and [facility] with family assist    Plan Comments  [facility] at bedside delivering her medications and supplies and providing education.  [facility] accepts and family will transport....................Jennie Osorio RN        Discharge Codes    No documentation.       Expected Discharge Date and Time     Expected Discharge Date Expected Discharge Time    Jul 7, 2021             Jennie Osorio RN

## 2021-07-07 NOTE — PROGRESS NOTES
[facility]  Progress note: 2021    Patient Identification:  Name:Paz Browne  Age:66 y.o.  Sex: female  :  1955  MRN: [id number]           CC:  Recurrent bacteremia with probable endocarditis, pulmonary hypertension, paroxysmal atrial fibrillation    Interval history:  Vitals stable overnight.  No chest pain shortness of breath.  Patient to be discharged today with home health for IV infusion.  Will follow with ID with plans for oral suppression for 3 months.    Vital Signs:   Temp:  [97.9 °F (36.6 °C)-98.4 °F (36.9 °C)] 97.9 °F (36.6 °C)  Heart Rate:  [68-88] 68  Resp:  [18] 18  BP: (111-165)/(53-72) 111/58    Intake/Output Summary (Last 24 hours) at 2021 1433  Last data filed at 2021 1800  Gross per 24 hour   Intake 210 ml   Output --   Net 210 ml       Physical Examination:    General Appearance No acute distress   Neck No adenopathy, supple, trachea midline, no thyromegaly, no carotid bruit, no JVD   Lungs Clear to auscultation,respirations regular, even and unlabored   Heart Regular rhythm and normal rate, normal S1 and S2, 1 out of 6 murmur, no gallop, no rub, no click   Chest wall No abnormalities observed   Abdomen Normal bowel sounds, no masses, no hepatomegaly, soft   Extremities Moves all extremities well, no edema, no cyanosis, no redness   Neurological Alert and oriented x 3     Lab Review:  Personally reviewed the labs, radiology imaging and other cardiac procedures.   Results from last 7 days   Lab Units 21  0510 21  1516   SODIUM mmol/L 138 139   POTASSIUM mmol/L 3.9 2.7*   CHLORIDE mmol/L 97* 94*   CO2 mmol/L 32.9* 33.8*   BUN mg/dL 9 13   CREATININE mg/dL 0.69 0.79   CALCIUM mg/dL 8.9 9.0   BILIRUBIN mg/dL  --  1.0   ALK PHOS U/L  --  72   ALT (SGPT) U/L  --  18   AST (SGOT) U/L  --  20   GLUCOSE mg/dL 105* 117*         Results from last 7 days   Lab Units 21  0510 21  1516   WBC 10*3/mm3 7.50 9.69   HEMOGLOBIN g/dL 11.5* 12.1   HEMATOCRIT  % 34.9 36.9   PLATELETS 10*3/mm3 155 147         Medication Review:   Meds reviewed  Scheduled Meds:amLODIPine, 10 mg, Oral, Daily  aspirin, 81 mg, Oral, Daily  atorvastatin, 40 mg, Oral, Daily  budesonide-formoterol, 2 puff, Inhalation, BID - RT  carvedilol, 6.25 mg, Oral, BID With Meals  cetirizine, 10 mg, Oral, Daily  cyclobenzaprine, 10 mg, Oral, Nightly  enoxaparin, 40 mg, Subcutaneous, Q24H  furosemide, 40 mg, Oral, BID  guaiFENesin, 600 mg, Oral, BID  magnesium oxide, 400 mg, Oral, Daily  pantoprazole, 40 mg, Oral, QAM  penicillin g (potassium), 4 Million Units, Intravenous, Q4H  polyethylene glycol, 17 g, Oral, Daily  potassium chloride, 20 mEq, Oral, BID With Meals  potassium chloride, 10 mEq, Intravenous, Once   And  potassium chloride, 10 mEq, Intravenous, Once   And  potassium chloride, 10 mEq, Intravenous, Once   And  potassium chloride, 10 mEq, Intravenous, Once   And  potassium chloride, 10 mEq, Intravenous, Once  roflumilast, 500 mcg, Oral, Daily  rOPINIRole, 2 mg, Oral, Nightly  sertraline, 100 mg, Oral, Daily  sodium chloride, 10 mL, Intravenous, Q12H  tiotropium bromide monohydrate, 2 puff, Inhalation, Daily - RT      Continuous Infusions:O2, 3 L/min, Last Rate: 3 L/min (07/02/21 2039)      I personally viewed and interpreted the patient's EKG/Telemetry data    Assessment and Plan    1.  Recurrent strep bacteremia.    CHIO consistent with probable endocarditis with small paravalvular leak.  Plan on repeat imaging initially with transthoracic imaging and possible CHIO after completion of IV antibiotic regimen.  She will see me in follow-up in 4 weeks.  2.  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    As above.  3.  History of severe pulmonary hypertension, recheck at follow-up.  4.  Chronic hypoxic respiratory failure/COPD  5.  Paroxysmal atrial fibrillation with prior appendage closure.  Remains in  sinus rhythm.  No anticoagulation with history of GI bleed  6.  Coronavirus (not COVID-19) infection.     Mini Galeano  7/7/202114:33 EDT  25min spent in reviewing records, discussion and examination of the patient and discussion with other members of the patient's medical team.     Dictated utilizing Dragon dictation

## 2021-07-07 NOTE — PROGRESS NOTES
Home Health referral received. Patient/spouse agreeable to services per [facility]. [facility] will be the infusion provider. Contact information confirmed. Please note: patient's cell number is not currently working. Home # [phone]/ Spouse cell # [phone]. Thank you.

## 2021-07-07 NOTE — PROGRESS NOTES
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 She was seen by cardiac surgery who recommends medical management. She is tolerating PCN G w/o rash.     ROS: no n/v/d    Vital Signs  Temp:  [98.2 °F (36.8 °C)-98.5 °F (36.9 °C)] 98.3 °F (36.8 °C)  Heart Rate:  [68-88] 76  Resp:  [18] 18  BP: (117-165)/(53-72) 143/60    Physical Exam:  General: awake, alert, very nice  Cardiovascular: NR  Respiratory: mild rales at the bases bilaterally; no wheezing  GI: Soft, non-tender  Skin: No rashes     Antibiotics:  Penicillin G 4 million units IV q4 hours     Results Review:    Micro reviewed today  Lab Results   Component Value Date    WBC 7.50 07/03/2021    HGB 11.5 (L) 07/03/2021    HCT 34.9 07/03/2021    MCV 87.7 07/03/2021     07/03/2021     Lab Results   Component Value Date    GLUCOSE 105 (H) 07/03/2021    BUN 9 07/03/2021    CREATININE 0.69 07/03/2021    EGFRIFNONA 85 07/03/2021    BCR 13.0 07/03/2021    CO2 32.9 (H) 07/03/2021    CALCIUM 8.9 07/03/2021    ALBUMIN 4.20 07/02/2021    AST 20 07/02/2021    ALT 18 07/02/2021       Microbiology:  6/14 RPP: + coronavirus NL63  6/14 BCx: Streptococcus gallolyticus   6/16 BCx: negative  7/1 BCx: Streptococcus gallolyticus (sensitive to penicillin, ceftriaxone, vanco)  7/2 BCx Streptococcus gallolyticus   7/2 COVID negative  7/3 BCx: NGTD  7/5 COVID: negative  7/5 BCx: NGTD    New Radiology (report reviewed and d/w performing cardiologist):  CHIO with evidence of vegetation on prosthetic MV    Assessment/Plan   1. 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 Chronic hypoxic respiratory failure (3L NC at home)  3. Mitral valve replaced (8/2014)  4. Tricuspid valve repair (8/2014)    On 7/6/21, she underwent CHIO which showed findings consistent w/ prosthetic valve endocarditis. She was seen by cardiac surgery  who recommends medical management.     While in-house, continue penicillin G 4 million units IV q4h.     When ready for discharge, transition to penicillin G 24 million units IV q24h via continuous infusion w/ stop date 8/17/21 at which time she will follow-up w/ me in the ID clinic. At that visit, I will probably change her to oral suppression for 3 months.     Weekly CBC w/ diff, BMP faxed to me at [phone]. PICC has been ordered.     Thank you for allowing me to be involved in the care of Mrs. Browne. Infectious diseases will sign off at this time with antibiotics plan in place, but please call me at [phone] if any further ID questions or new ID concerns.

## 2021-07-08 ENCOUNTER — TRANSITIONAL CARE MANAGEMENT TELEPHONE ENCOUNTER (OUTPATIENT)
Dept: CALL CENTER | Facility: HOSPITAL | Age: 66
End: 2021-07-08

## 2021-07-08 ENCOUNTER — HOME CARE VISIT (OUTPATIENT)
Dept: HOME HEALTH SERVICES | Facility: HOME HEALTHCARE | Age: 66
End: 2021-07-08

## 2021-07-08 ENCOUNTER — PRIOR AUTHORIZATION (OUTPATIENT)
Dept: SOCIAL WORK | Facility: HOSPITAL | Age: 66
End: 2021-07-08

## 2021-07-08 VITALS
TEMPERATURE: 97.9 F | HEART RATE: 82 BPM | OXYGEN SATURATION: 99 % | SYSTOLIC BLOOD PRESSURE: 162 MMHG | DIASTOLIC BLOOD PRESSURE: 62 MMHG

## 2021-07-08 LAB
BACTERIA SPEC AEROBE CULT: NORMAL
BACTERIA SPEC AEROBE CULT: NORMAL

## 2021-07-08 PROCEDURE — G0299 HHS/HOSPICE OF RN EA 15 MIN: HCPCS

## 2021-07-08 NOTE — OUTREACH NOTE
Prep Survey      Responses   [facility]   Is LACE score < 7 ?  No   Emergency Room discharge w/ pulse ox?  No   Eligibility  [facility]   Date of Admission  07/02/21   Date of Discharge  07/07/21   Discharge Disposition  Home or Self Care   Discharge diagnosis  Acute bacterial endocarditis   Does the patient have one of the following disease processes/diagnoses(primary or secondary)?  Other   Does the patient have Home health ordered?  Yes   What is the Home health agency?    [facility]  and [facility]   Is there a DME ordered?  No   Prep survey completed?  Yes          Chelo Del Rosario RN

## 2021-07-08 NOTE — OUTREACH NOTE
Call Center TCM Note      Responses   [facility] [place] [facility] from?  [place]   Does the patient have one of the following disease processes/diagnoses(primary or secondary)?  Other   TCM attempt successful?  Yes   Call start time  1547   Call end time  1555   Discharge diagnosis  Acute bacterial endocarditis   Meds reviewed with patient/caregiver?  Yes   Is the patient having any side effects they believe may be caused by any medication additions or changes?  No   Does the patient have all medications ordered at discharge?  No   What is keeping the patient from filling the prescriptions?  Financial [Insurance will not cover Fiorcet]   Nursing Interventions  No intervention needed   Is the patient taking all medications as directed (includes completed medication regime)?  Yes   Does the patient have a primary care provider?   Yes   Does the patient have an appointment with their PCP within 7 days of discharge?  No   What is preventing the patient from scheduling follow up appointments within 7 days of discharge?  Waiting on return call   Nursing Interventions  -- [Notified patient that office will be notified to call her to schedule a hospital f/u,  no available appointments on  that meet TCM guidelines that can be scheduled per this RN. Patient has requested visit to be telephone f/u if possible]   Has the patient kept scheduled appointments due by today?  N/A   What is the Home health agency?    [facility] for IV antibiotic   Has home health visited the patient within 72 hours of discharge?  Call prior to 72 hours   DME comments  Resp A Care currently in home due to issues with O2 concentrator   Psychosocial issues?  No   Did the patient receive a copy of their discharge instructions?  Yes   Nursing interventions  Reviewed instructions with patient   What is the patient's perception of their health status since discharge?  Improving   Is the patient/caregiver able to  teach back signs and symptoms related to disease process for when to call PCP?  Yes   Is the patient/caregiver able to teach back signs and symptoms related to disease process for when to call 911?  Yes   Is the patient/caregiver able to teach back the hierarchy of who to call/visit for symptoms/problems? PCP, Specialist, Home health nurse, Urgent Care, ED, 911  Yes   If the patient is a current smoker, are they able to teach back resources for cessation?  Not a smoker   TCM call completed?  Yes          Yun Abreu RN    7/8/2021, 15:56 EDT

## 2021-07-09 ENCOUNTER — HOME CARE VISIT (OUTPATIENT)
Dept: HOME HEALTH SERVICES | Facility: HOME HEALTHCARE | Age: 66
End: 2021-07-09

## 2021-07-09 NOTE — HOME HEALTH
Patient recently DC for Endocarditis, Septicemia. Patient receiving abx. Next visit needs education on changing tube. Daughter and  both present during visit. Both taught back changing of the IV bag.

## 2021-07-10 ENCOUNTER — HOME CARE VISIT (OUTPATIENT)
Dept: HOME HEALTH SERVICES | Facility: HOME HEALTHCARE | Age: 66
End: 2021-07-10

## 2021-07-10 VITALS
HEART RATE: 73 BPM | RESPIRATION RATE: 18 BRPM | OXYGEN SATURATION: 99 % | TEMPERATURE: 97.3 F | DIASTOLIC BLOOD PRESSURE: 62 MMHG | SYSTOLIC BLOOD PRESSURE: 142 MMHG

## 2021-07-10 LAB
BACTERIA SPEC AEROBE CULT: NORMAL
BACTERIA SPEC AEROBE CULT: NORMAL

## 2021-07-10 PROCEDURE — G0299 HHS/HOSPICE OF RN EA 15 MIN: HCPCS

## 2021-07-12 ENCOUNTER — HOME CARE VISIT (OUTPATIENT)
Dept: HOME HEALTH SERVICES | Facility: HOME HEALTHCARE | Age: 66
End: 2021-07-12

## 2021-07-12 ENCOUNTER — LAB REQUISITION (OUTPATIENT)
Dept: LAB | Facility: HOSPITAL | Age: 66
End: 2021-07-12

## 2021-07-12 DIAGNOSIS — Z00.00 ENCOUNTER FOR GENERAL ADULT MEDICAL EXAMINATION WITHOUT ABNORMAL FINDINGS: ICD-10-CM

## 2021-07-12 LAB
ANION GAP SERPL CALCULATED.3IONS-SCNC: 12.2 MMOL/L (ref 5–15)
BASOPHILS # BLD AUTO: 0.05 10*3/MM3 (ref 0–0.2)
BASOPHILS NFR BLD AUTO: 0.8 % (ref 0–1.5)
BUN SERPL-MCNC: 9 MG/DL (ref 8–23)
BUN/CREAT SERPL: 11.3 (ref 7–25)
CALCIUM SPEC-SCNC: 9.4 MG/DL (ref 8.6–10.5)
CHLORIDE SERPL-SCNC: 102 MMOL/L (ref 98–107)
CO2 SERPL-SCNC: 28.8 MMOL/L (ref 22–29)
CREAT SERPL-MCNC: 0.8 MG/DL (ref 0.57–1)
DEPRECATED RDW RBC AUTO: 47.9 FL (ref 37–54)
EOSINOPHIL # BLD AUTO: 0.44 10*3/MM3 (ref 0–0.4)
EOSINOPHIL NFR BLD AUTO: 7.4 % (ref 0.3–6.2)
ERYTHROCYTE [DISTWIDTH] IN BLOOD BY AUTOMATED COUNT: 14.6 % (ref 12.3–15.4)
GFR SERPL CREATININE-BSD FRML MDRD: 71 ML/MIN/1.73
GFR SERPL CREATININE-BSD FRML MDRD: 86 ML/MIN/1.73
GLUCOSE SERPL-MCNC: 76 MG/DL (ref 65–99)
HCT VFR BLD AUTO: 32.7 % (ref 34–46.6)
HGB BLD-MCNC: 10.2 G/DL (ref 12–15.9)
IMM GRANULOCYTES # BLD AUTO: 0.03 10*3/MM3 (ref 0–0.05)
IMM GRANULOCYTES NFR BLD AUTO: 0.5 % (ref 0–0.5)
LYMPHOCYTES # BLD AUTO: 1.15 10*3/MM3 (ref 0.7–3.1)
LYMPHOCYTES NFR BLD AUTO: 19.4 % (ref 19.6–45.3)
MCH RBC QN AUTO: 27.8 PG (ref 26.6–33)
MCHC RBC AUTO-ENTMCNC: 31.2 G/DL (ref 31.5–35.7)
MCV RBC AUTO: 89.1 FL (ref 79–97)
MONOCYTES # BLD AUTO: 0.26 10*3/MM3 (ref 0.1–0.9)
MONOCYTES NFR BLD AUTO: 4.4 % (ref 5–12)
NEUTROPHILS NFR BLD AUTO: 3.99 10*3/MM3 (ref 1.7–7)
NEUTROPHILS NFR BLD AUTO: 67.5 % (ref 42.7–76)
NRBC BLD AUTO-RTO: 0 /100 WBC (ref 0–0.2)
PLATELET # BLD AUTO: 223 10*3/MM3 (ref 140–450)
PMV BLD AUTO: 10.5 FL (ref 6–12)
POTASSIUM SERPL-SCNC: 3.7 MMOL/L (ref 3.5–5.2)
RBC # BLD AUTO: 3.67 10*6/MM3 (ref 3.77–5.28)
SODIUM SERPL-SCNC: 143 MMOL/L (ref 136–145)
WBC # BLD AUTO: 5.92 10*3/MM3 (ref 3.4–10.8)

## 2021-07-12 PROCEDURE — G0299 HHS/HOSPICE OF RN EA 15 MIN: HCPCS

## 2021-07-12 PROCEDURE — 85025 COMPLETE CBC W/AUTO DIFF WBC: CPT | Performed by: INTERNAL MEDICINE

## 2021-07-12 PROCEDURE — 80048 BASIC METABOLIC PNL TOTAL CA: CPT | Performed by: INTERNAL MEDICINE

## 2021-07-14 VITALS — DIASTOLIC BLOOD PRESSURE: 70 MMHG | OXYGEN SATURATION: 98 % | SYSTOLIC BLOOD PRESSURE: 150 MMHG | HEART RATE: 88 BPM

## 2021-07-14 PROCEDURE — G0180 MD CERTIFICATION HHA PATIENT: HCPCS | Performed by: INTERNAL MEDICINE

## 2021-07-15 DIAGNOSIS — Z79.891 LONG TERM CURRENT USE OF OPIATE ANALGESIC: ICD-10-CM

## 2021-07-15 RX ORDER — HYDROCODONE BITARTRATE AND ACETAMINOPHEN 5; 325 MG/1; MG/1
1 TABLET ORAL EVERY 8 HOURS PRN
Qty: 90 TABLET | Refills: 0 | Status: SHIPPED | OUTPATIENT
Start: 2021-07-15 | End: 2021-08-12 | Stop reason: SDUPTHER

## 2021-07-15 NOTE — TELEPHONE ENCOUNTER
Caller: Paz Browne    Relationship: Self    Best call back number: [phone]     Medication needed:   Requested Prescriptions     Pending Prescriptions Disp Refills   • HYDROcodone-acetaminophen (NORCO) 5-325 MG per tablet 90 tablet 0     Sig: Take 1 tablet by mouth Every 8 (Eight) Hours As Needed for Moderate Pain . Chronic pain medicine for low back pain       When do you need the refill by: ASAP     Does the patient have less than a 3 day supply:  [] Yes  [x] No    What is the patient's preferred pharmacy: [facility]/PHARMACY #[id number] - [place], [place] - [address] [place] [address] - [phone]  - [phone] FX

## 2021-07-16 ENCOUNTER — READMISSION MANAGEMENT (OUTPATIENT)
Dept: CALL CENTER | Facility: HOSPITAL | Age: 66
End: 2021-07-16

## 2021-07-16 NOTE — OUTREACH NOTE
Medical Week 2 Survey      Responses   [facility] patient discharged from?  [place]   Does the patient have one of the following disease processes/diagnoses(primary or secondary)?  Other   Week 2 attempt successful?  Yes   Call start time  1335   Discharge diagnosis  Acute bacterial endocarditis   Call end time  1336   Meds reviewed with patient/caregiver?  Yes   Is the patient having any side effects they believe may be caused by any medication additions or changes?  No   Does the patient have all medications ordered at discharge?  Yes   Is the patient taking all medications as directed (includes completed medication regime)?  Yes   Does the patient have a primary care provider?   Yes   Has the patient kept scheduled appointments due by today?  Yes   Psychosocial issues?  No   Did the patient receive a copy of their discharge instructions?  Yes   Nursing interventions  Reviewed instructions with patient, Educated on MyChart   What is the patient's perception of their health status since discharge?  Improving   Is the patient/caregiver able to teach back signs and symptoms related to disease process for when to call PCP?  Yes   Is the patient/caregiver able to teach back signs and symptoms related to disease process for when to call 911?  Yes   Is the patient/caregiver able to teach back the hierarchy of who to call/visit for symptoms/problems? PCP, Specialist, Home health nurse, Urgent Care, ED, 911  Yes   If the patient is a current smoker, are they able to teach back resources for cessation?  Not a smoker   Week 2 Call Completed?  Yes          Jeaneth Vasquez RN

## 2021-07-19 ENCOUNTER — HOME CARE VISIT (OUTPATIENT)
Dept: HOME HEALTH SERVICES | Facility: HOME HEALTHCARE | Age: 66
End: 2021-07-19

## 2021-07-19 ENCOUNTER — LAB REQUISITION (OUTPATIENT)
Dept: LAB | Facility: HOSPITAL | Age: 66
End: 2021-07-19

## 2021-07-19 DIAGNOSIS — Z00.00 ENCOUNTER FOR GENERAL ADULT MEDICAL EXAMINATION WITHOUT ABNORMAL FINDINGS: ICD-10-CM

## 2021-07-19 LAB
ANION GAP SERPL CALCULATED.3IONS-SCNC: 19.4 MMOL/L (ref 5–15)
BASOPHILS # BLD AUTO: 0.1 10*3/MM3 (ref 0–0.2)
BASOPHILS NFR BLD AUTO: 1.2 % (ref 0–1.5)
BUN SERPL-MCNC: 10 MG/DL (ref 8–23)
BUN/CREAT SERPL: 13.3 (ref 7–25)
CALCIUM SPEC-SCNC: 9.9 MG/DL (ref 8.6–10.5)
CHLORIDE SERPL-SCNC: 101 MMOL/L (ref 98–107)
CO2 SERPL-SCNC: 27.6 MMOL/L (ref 22–29)
CREAT SERPL-MCNC: 0.75 MG/DL (ref 0.57–1)
DEPRECATED RDW RBC AUTO: 46.5 FL (ref 37–54)
EOSINOPHIL # BLD AUTO: 0.4 10*3/MM3 (ref 0–0.4)
EOSINOPHIL NFR BLD AUTO: 4.7 % (ref 0.3–6.2)
ERYTHROCYTE [DISTWIDTH] IN BLOOD BY AUTOMATED COUNT: 14.6 % (ref 12.3–15.4)
GFR SERPL CREATININE-BSD FRML MDRD: 77 ML/MIN/1.73
GLUCOSE SERPL-MCNC: 54 MG/DL (ref 65–99)
HCT VFR BLD AUTO: 35.5 % (ref 34–46.6)
HGB BLD-MCNC: 11.3 G/DL (ref 12–15.9)
IMM GRANULOCYTES # BLD AUTO: 0.03 10*3/MM3 (ref 0–0.05)
IMM GRANULOCYTES NFR BLD AUTO: 0.4 % (ref 0–0.5)
LYMPHOCYTES # BLD AUTO: 1.58 10*3/MM3 (ref 0.7–3.1)
LYMPHOCYTES NFR BLD AUTO: 18.7 % (ref 19.6–45.3)
MCH RBC QN AUTO: 27.8 PG (ref 26.6–33)
MCHC RBC AUTO-ENTMCNC: 31.8 G/DL (ref 31.5–35.7)
MCV RBC AUTO: 87.2 FL (ref 79–97)
MONOCYTES # BLD AUTO: 0.32 10*3/MM3 (ref 0.1–0.9)
MONOCYTES NFR BLD AUTO: 3.8 % (ref 5–12)
NEUTROPHILS NFR BLD AUTO: 6.02 10*3/MM3 (ref 1.7–7)
NEUTROPHILS NFR BLD AUTO: 71.2 % (ref 42.7–76)
NRBC BLD AUTO-RTO: 0 /100 WBC (ref 0–0.2)
PLATELET # BLD AUTO: 295 10*3/MM3 (ref 140–450)
PMV BLD AUTO: 10.6 FL (ref 6–12)
POTASSIUM SERPL-SCNC: 3 MMOL/L (ref 3.5–5.2)
RBC # BLD AUTO: 4.07 10*6/MM3 (ref 3.77–5.28)
SODIUM SERPL-SCNC: 148 MMOL/L (ref 136–145)
WBC # BLD AUTO: 8.45 10*3/MM3 (ref 3.4–10.8)

## 2021-07-19 PROCEDURE — 85025 COMPLETE CBC W/AUTO DIFF WBC: CPT | Performed by: INTERNAL MEDICINE

## 2021-07-19 PROCEDURE — G0299 HHS/HOSPICE OF RN EA 15 MIN: HCPCS

## 2021-07-19 PROCEDURE — 80048 BASIC METABOLIC PNL TOTAL CA: CPT | Performed by: INTERNAL MEDICINE

## 2021-07-20 ENCOUNTER — TELEPHONE (OUTPATIENT)
Dept: INFECTIOUS DISEASES | Facility: CLINIC | Age: 66
End: 2021-07-20

## 2021-07-20 ENCOUNTER — TELEPHONE (OUTPATIENT)
Dept: FAMILY MEDICINE CLINIC | Facility: CLINIC | Age: 66
End: 2021-07-20

## 2021-07-20 DIAGNOSIS — E87.6 HYPOKALEMIA: Primary | ICD-10-CM

## 2021-07-20 RX ORDER — POTASSIUM CHLORIDE 750 MG/1
10 TABLET, FILM COATED, EXTENDED RELEASE ORAL 2 TIMES DAILY
Qty: 180 TABLET | Refills: 1 | Status: SHIPPED | OUTPATIENT
Start: 2021-07-20 | End: 2021-11-29

## 2021-07-20 NOTE — TELEPHONE ENCOUNTER
Patient notified labs from an ID standpoint are stable. Her potassium was noted to be low and, per Dr. Clayton, I have notified Dr. Garcia office (took copy of the labs directly to their office with an attached note RE: Potassium/Lasix. Patient was told we were sending a copy of the labs to  and for her to contact them later today if they hadn't called her to let her know if she needs to adjust either her Furosemide dosage or add more Potassium. Patient verbalized understanding. JOSI, RN

## 2021-07-20 NOTE — TELEPHONE ENCOUNTER
Patient needs increase potassium to twice a day.  Patient needs to follow-up in 1 week with additional labs.

## 2021-07-20 NOTE — TELEPHONE ENCOUNTER
Caller: Paz Browne    Relationship: Self    Best call back number: [phone]     What medication are you requesting: INCREASE POTASSIUM    What are your current symptoms: SHE HAS RECENTLY BEEN IN THE [facility] IN MAY 2021, June 2021, July 2021.  SHE STATED THAT HE MOST RESENT LABS SHOWED POTASSIUM AT 3.0.  SHE ALSO SAID SHE HAS SEPSIS IN HER BLOOD.        If a prescription is needed, what is your preferred pharmacy and phone number:    [facility]/pharmacy #[id number] - [address] - [phone]  - [phone] FX

## 2021-07-22 VITALS
OXYGEN SATURATION: 97 % | WEIGHT: 130 LBS | DIASTOLIC BLOOD PRESSURE: 76 MMHG | HEART RATE: 84 BPM | SYSTOLIC BLOOD PRESSURE: 156 MMHG | BODY MASS INDEX: 20.36 KG/M2 | TEMPERATURE: 97.3 F | RESPIRATION RATE: 18 BRPM

## 2021-07-26 ENCOUNTER — HOME CARE VISIT (OUTPATIENT)
Dept: HOME HEALTH SERVICES | Facility: HOME HEALTHCARE | Age: 66
End: 2021-07-26

## 2021-07-26 ENCOUNTER — READMISSION MANAGEMENT (OUTPATIENT)
Dept: CALL CENTER | Facility: HOSPITAL | Age: 66
End: 2021-07-26

## 2021-07-26 ENCOUNTER — TELEPHONE (OUTPATIENT)
Dept: INFECTIOUS DISEASES | Facility: CLINIC | Age: 66
End: 2021-07-26

## 2021-07-26 ENCOUNTER — LAB REQUISITION (OUTPATIENT)
Dept: LAB | Facility: HOSPITAL | Age: 66
End: 2021-07-26

## 2021-07-26 ENCOUNTER — HOSPITAL ENCOUNTER (OUTPATIENT)
Dept: CT IMAGING | Facility: HOSPITAL | Age: 66
Discharge: HOME | End: 2021-07-26
Admitting: INTERNAL MEDICINE

## 2021-07-26 DIAGNOSIS — Z12.2 ENCOUNTER FOR SCREENING FOR LUNG CANCER: ICD-10-CM

## 2021-07-26 LAB
ANION GAP SERPL CALCULATED.3IONS-SCNC: 16.5 MMOL/L (ref 5–15)
BASOPHILS # BLD AUTO: 0.1 10*3/MM3 (ref 0–0.2)
BASOPHILS NFR BLD AUTO: 1.2 % (ref 0–1.5)
BUN SERPL-MCNC: 9 MG/DL (ref 8–23)
BUN/CREAT SERPL: 10.6 (ref 7–25)
CALCIUM SPEC-SCNC: 9.7 MG/DL (ref 8.6–10.5)
CHLORIDE SERPL-SCNC: 99 MMOL/L (ref 98–107)
CO2 SERPL-SCNC: 30.5 MMOL/L (ref 22–29)
CREAT SERPL-MCNC: 0.85 MG/DL (ref 0.57–1)
DEPRECATED RDW RBC AUTO: 45 FL (ref 37–54)
EOSINOPHIL # BLD AUTO: 0.94 10*3/MM3 (ref 0–0.4)
EOSINOPHIL NFR BLD AUTO: 11.7 % (ref 0.3–6.2)
ERYTHROCYTE [DISTWIDTH] IN BLOOD BY AUTOMATED COUNT: 14.4 % (ref 12.3–15.4)
GFR SERPL CREATININE-BSD FRML MDRD: 67 ML/MIN/1.73
GLUCOSE SERPL-MCNC: 58 MG/DL (ref 65–99)
HCT VFR BLD AUTO: 35.7 % (ref 34–46.6)
HGB BLD-MCNC: 11.3 G/DL (ref 12–15.9)
IMM GRANULOCYTES # BLD AUTO: 0.04 10*3/MM3 (ref 0–0.05)
IMM GRANULOCYTES NFR BLD AUTO: 0.5 % (ref 0–0.5)
LYMPHOCYTES # BLD AUTO: 1.33 10*3/MM3 (ref 0.7–3.1)
LYMPHOCYTES NFR BLD AUTO: 16.5 % (ref 19.6–45.3)
MCH RBC QN AUTO: 27.2 PG (ref 26.6–33)
MCHC RBC AUTO-ENTMCNC: 31.7 G/DL (ref 31.5–35.7)
MCV RBC AUTO: 85.8 FL (ref 79–97)
MONOCYTES # BLD AUTO: 0.42 10*3/MM3 (ref 0.1–0.9)
MONOCYTES NFR BLD AUTO: 5.2 % (ref 5–12)
NEUTROPHILS NFR BLD AUTO: 5.22 10*3/MM3 (ref 1.7–7)
NEUTROPHILS NFR BLD AUTO: 64.9 % (ref 42.7–76)
NRBC BLD AUTO-RTO: 0 /100 WBC (ref 0–0.2)
PLATELET # BLD AUTO: 299 10*3/MM3 (ref 140–450)
PMV BLD AUTO: 10.8 FL (ref 6–12)
POTASSIUM SERPL-SCNC: 3.4 MMOL/L (ref 3.5–5.2)
RBC # BLD AUTO: 4.16 10*6/MM3 (ref 3.77–5.28)
SODIUM SERPL-SCNC: 146 MMOL/L (ref 136–145)
WBC # BLD AUTO: 8.05 10*3/MM3 (ref 3.4–10.8)

## 2021-07-26 PROCEDURE — 80048 BASIC METABOLIC PNL TOTAL CA: CPT | Performed by: INTERNAL MEDICINE

## 2021-07-26 PROCEDURE — G0299 HHS/HOSPICE OF RN EA 15 MIN: HCPCS

## 2021-07-26 PROCEDURE — 71271 CT THORAX LUNG CANCER SCR C-: CPT

## 2021-07-26 PROCEDURE — 85025 COMPLETE CBC W/AUTO DIFF WBC: CPT | Performed by: INTERNAL MEDICINE

## 2021-07-26 NOTE — TELEPHONE ENCOUNTER
From Otilia briggs/LINDSAY:    Good Morning,     I just spoke with Malissa Calabrese the daughter of Paz Browne : 51 that is currently on IV Continuous PCN. She complains of itchy raised rash on her stomach and back. The daughter states that she has been that way for a couple of days but her mom just brought it to her attention this morning. Ms. Browne took a Benadryl about an hour ago which has calmed the itching. I instructed her to stop the PCN until further direction.     The daughter would like for us to call her at # [phone] since her mom has difficulty hearing.     Thank you,     Otilia Varela, PharmD  Staff Pharmacist  [facility]  [address] [place] [address]    [phone] office  [phone] fax  [email or web address]

## 2021-07-26 NOTE — CASE COMMUNICATION
Amairani- please note the IV Abx is being changed from PCN to Ceftriaxone starting 7/27/21. Daughter Malissa told me that Lulu ([facility] pharmacy RN) will make visit 7/27/21 to educate about the new Abx & bring supplies. Please note that weekly labs have changed slightly-  CBC w/ diff and a Crt (Creatinine). Patient apparently was having a reaction to the PCN so daughter call the pharmacy. Please make the changes to the allergies and added/discontinued meds on the patient's chart. Thanks

## 2021-07-26 NOTE — OUTREACH NOTE
Medical Week 3 Survey      Responses   [facility] patient discharged from?  [place]   Does the patient have one of the following disease processes/diagnoses(primary or secondary)?  Other   Week 3 attempt successful?  Yes   Call start time  0934   Call end time  0939   Is the patient having any side effects they believe may be caused by any medication additions or changes?  Yes   Side effects comments   Pt reports itching all over. Antibiotics have been discontinued.   Comments regarding appointments  Pt to have a CT scan on this day.   Has the patient kept scheduled appointments due by today?  Yes   What is the Home health agency?   antibiotics have discontinued due to rash. MD aware.   What is the patient's perception of their health status since discharge?  Improving   Week 3 Call Completed?  Yes   Wrap up additional comments  Pt reports feeling well. MD called this am for rash all over pt's body. IV antibiotics have been d/c at this time.          Amanda Art RN

## 2021-07-29 RX ORDER — CARVEDILOL 6.25 MG/1
TABLET ORAL
Qty: 180 TABLET | Refills: 2 | Status: SHIPPED | OUTPATIENT
Start: 2021-07-29 | End: 2022-03-25

## 2021-07-29 RX ORDER — MAGNESIUM OXIDE 400 MG/1
TABLET ORAL
Qty: 90 TABLET | Refills: 1 | OUTPATIENT
Start: 2021-07-29

## 2021-07-30 ENCOUNTER — LAB (OUTPATIENT)
Dept: LAB | Facility: HOSPITAL | Age: 66
End: 2021-07-30

## 2021-07-30 ENCOUNTER — OFFICE VISIT (OUTPATIENT)
Dept: ONCOLOGY | Facility: CLINIC | Age: 66
End: 2021-07-30

## 2021-07-30 VITALS
TEMPERATURE: 98 F | WEIGHT: 128.8 LBS | SYSTOLIC BLOOD PRESSURE: 184 MMHG | RESPIRATION RATE: 18 BRPM | HEIGHT: 67 IN | OXYGEN SATURATION: 96 % | DIASTOLIC BLOOD PRESSURE: 75 MMHG | BODY MASS INDEX: 20.21 KG/M2 | HEART RATE: 70 BPM

## 2021-07-30 DIAGNOSIS — D64.9 ANEMIA, UNSPECIFIED TYPE: ICD-10-CM

## 2021-07-30 DIAGNOSIS — D64.9 ANEMIA, UNSPECIFIED TYPE: Primary | ICD-10-CM

## 2021-07-30 LAB
BASOPHILS # BLD AUTO: 0.08 10*3/MM3 (ref 0–0.2)
BASOPHILS NFR BLD AUTO: 0.9 % (ref 0–1.5)
DEPRECATED RDW RBC AUTO: 47.1 FL (ref 37–54)
EOSINOPHIL # BLD AUTO: 0.73 10*3/MM3 (ref 0–0.4)
EOSINOPHIL NFR BLD AUTO: 8.4 % (ref 0.3–6.2)
ERYTHROCYTE [DISTWIDTH] IN BLOOD BY AUTOMATED COUNT: 14.6 % (ref 12.3–15.4)
FERRITIN SERPL-MCNC: 488.2 NG/ML (ref 13–150)
HCT VFR BLD AUTO: 39.1 % (ref 34–46.6)
HGB BLD-MCNC: 12 G/DL (ref 12–15.9)
IMM GRANULOCYTES # BLD AUTO: 0.02 10*3/MM3 (ref 0–0.05)
IMM GRANULOCYTES NFR BLD AUTO: 0.2 % (ref 0–0.5)
IRON 24H UR-MRATE: 37 MCG/DL (ref 37–145)
IRON SATN MFR SERPL: 13 % (ref 14–48)
LYMPHOCYTES # BLD AUTO: 1.66 10*3/MM3 (ref 0.7–3.1)
LYMPHOCYTES NFR BLD AUTO: 19.1 % (ref 19.6–45.3)
MCH RBC QN AUTO: 27 PG (ref 26.6–33)
MCHC RBC AUTO-ENTMCNC: 30.7 G/DL (ref 31.5–35.7)
MCV RBC AUTO: 88.1 FL (ref 79–97)
MONOCYTES # BLD AUTO: 0.39 10*3/MM3 (ref 0.1–0.9)
MONOCYTES NFR BLD AUTO: 4.5 % (ref 5–12)
NEUTROPHILS NFR BLD AUTO: 5.81 10*3/MM3 (ref 1.7–7)
NEUTROPHILS NFR BLD AUTO: 66.9 % (ref 42.7–76)
NRBC BLD AUTO-RTO: 0 /100 WBC (ref 0–0.2)
PLATELET # BLD AUTO: 302 10*3/MM3 (ref 140–450)
PMV BLD AUTO: 10.1 FL (ref 6–12)
RBC # BLD AUTO: 4.44 10*6/MM3 (ref 3.77–5.28)
TIBC SERPL-MCNC: 283 MCG/DL (ref 249–505)
TRANSFERRIN SERPL-MCNC: 202 MG/DL (ref 200–360)
WBC # BLD AUTO: 8.69 10*3/MM3 (ref 3.4–10.8)

## 2021-07-30 PROCEDURE — 99213 OFFICE O/P EST LOW 20 MIN: CPT | Performed by: INTERNAL MEDICINE

## 2021-07-30 PROCEDURE — 83540 ASSAY OF IRON: CPT

## 2021-07-30 PROCEDURE — 85025 COMPLETE CBC W/AUTO DIFF WBC: CPT

## 2021-07-30 PROCEDURE — 82728 ASSAY OF FERRITIN: CPT

## 2021-07-30 PROCEDURE — 36415 COLL VENOUS BLD VENIPUNCTURE: CPT

## 2021-07-30 PROCEDURE — 84466 ASSAY OF TRANSFERRIN: CPT

## 2021-07-30 RX ORDER — PENICILLIN G POTASSIUM 20000000 [IU]/1
INJECTION, POWDER, FOR SOLUTION INTRAVENOUS
COMMUNITY
Start: 2021-07-20 | End: 2021-08-11 | Stop reason: ALTCHOICE

## 2021-07-30 RX ORDER — CEFTRIAXONE 2 G/1
INJECTION, POWDER, FOR SOLUTION INTRAMUSCULAR; INTRAVENOUS
COMMUNITY
Start: 2021-07-26 | End: 2021-08-17

## 2021-07-30 RX ORDER — LISINOPRIL 40 MG/1
40 TABLET ORAL DAILY
COMMUNITY
Start: 2021-06-05

## 2021-07-30 RX ORDER — SODIUM CHLORIDE 0.9 % (FLUSH) 0.9 %
SYRINGE (ML) INJECTION
COMMUNITY
Start: 2021-07-26 | End: 2022-10-26

## 2021-07-30 RX ORDER — EPINEPHRINE 0.3 MG/.3ML
SOLUTION INTRAMUSCULAR; SUBCUTANEOUS
COMMUNITY
Start: 2021-07-06

## 2021-07-30 NOTE — PROGRESS NOTES
.     REASON FOR FOLLOWUP:     *  Anemia in the setting of a stage III chronic renal insufficiency.  Patient had a significant elevated ferritin in March 2021.  She also had supratherapeutic folate and vitamin B12 level.                                  HISTORY OF PRESENT ILLNESS:  The patient is a 66 y.o. year old female patient with history of hypertension, emphysema secondary to long time cigarette smoking, currently on home O2 at 3 liters per minute, and history of both tricuspid valve repair and mitral valve replacement with tissue graft, and also history of GI bleeding in 2017, who presented today for 2-month follow-up.  Patient is accompanied by her son today.  Patient reports she feels better now.  She is on long-term IV antibiotics.    According to medical records, this patient was admitted recently twice since I saw her 8 weeks ago.  She was admitted in June 2021 and then early July 2021 for bacteremia, bacterial endocarditis and is now is IV antibiotics.  She has a PICC line in the left upper arm.  Patient reports no fever sweating chills.  She still has poor performance status ECOG 3.  She is in wheelchair today coming to clinic.  She does walk with a walker at home.    Patient reports no bleeding or bruising.    Laboratory study today on 7/30/2021 actually showed normal hemoglobin 12.0.  She has normal WBC 8690, including ANC 5810, and normal platelets 202,000.  Iron studies still show elevated ferritin 488, low iron saturation 13%.  This fits with informatory reaction.        Past Medical History:   Diagnosis Date   • VAN (acute kidney injury) (CMS/HCC)    • Anemia    • Asthma    • Atrial flutter (CMS/HCC)     cardioversion   • Cataract    • Celiac artery stenosis (CMS/HCC)    • Chronic respiratory failure with hypoxia (CMS/HCC)    • Colon polyp    • COPD (chronic obstructive pulmonary disease) (CMS/HCC)    • Emphysema of lung (CMS/HCC)    • GI bleed    • Hiatal hernia    • History of CHF  (congestive heart failure)     due to MR   • History of home oxygen therapy     3 lpm NC   • History of mitral valve replacement with tissue graft    • Hyperlipidemia    • Hypertension    • Infectious viral hepatitis     B   • Intertrigo    • Long term (current) use of anticoagulants    • Mitral regurgitation     s/p tissue MVR   • PAF (paroxysmal atrial fibrillation) (CMS/HCC)     s/p MAZE   • Pneumonia    • Pulmonary hypertension (CMS/HCC)    • S/P TVR (tricuspid valve repair) 7/7/2016   · Hepatitis B   · Former heavy smoker.     Past Surgical History:   Procedure Laterality Date   • CARDIAC CATHETERIZATION  09/01/2014    Right dominant systemt, normal coronary arteries.    • CARDIAC CATHETERIZATION Left 6/10/2016    Procedure: Cardiac catheterization;  Surgeon: Sergei Hall MD;  Location: [facility] CATH INVASIVE LOCATION;  Service:    • CARDIAC CATHETERIZATION N/A 6/10/2016    Procedure: Right Heart Cath;  Surgeon: Sergei Hall MD;  Location: [facility] CATH INVASIVE LOCATION;  Service:    • CATARACT EXTRACTION     • COLONOSCOPY     • COLONOSCOPY N/A 8/4/2017    Procedure: COLONOSCOPY TO CECUM/TI WITH POLYPECTOMY ( COLD BX);  Surgeon: Cleveland Devine MD;  Location: [facility] ENDOSCOPY;  Service:    • COLONOSCOPY N/A 8/10/2017    Procedure: COLONOSCOPY to cecum and TI with 2 clips placed at transverse;  Surgeon: Earnest PALOMO MD;  Location: [facility] ENDOSCOPY;  Service:    • COLONOSCOPY N/A 12/22/2017    Procedure: COLONOSCOPY INTO CECUM WITH COLD POLYPECTOMIES;  Surgeon: Cleveland Devine MD;  Location: [facility] ENDOSCOPY;  Service:    • COLONOSCOPY N/A 5/17/2021    Procedure: COLONOSCOPY to cecum;  Surgeon: Cleveland Devine MD;  Location: [facility] ENDOSCOPY;  Service: Gastroenterology;  Laterality: N/A;  Pre: Fe deficency anemia, h/x of polyps  Post: fair prep, normal   • ENDOSCOPY N/A 8/17/2017    Procedure: ESOPHAGOGASTRODUODENOSCOPY;  Surgeon: Porsha Ruby MD;  Location: [facility] ENDOSCOPY;  Service:    • ENDOSCOPY N/A 12/22/2017     Procedure: ESOPHAGOGASTRODUODENOSCOPY WITH BIOPSIES;  Surgeon: Cleveland Devine MD;  Location: [facility] ENDOSCOPY;  Service:    • ENDOSCOPY N/A 5/17/2021    Procedure: ESOPHAGOGASTRODUODENOSCOPY  with biopsies;  Surgeon: Cleveland Devine MD;  Location: [facility] ENDOSCOPY;  Service: Gastroenterology;  Laterality: N/A;  Pre: Fe deficency anemia, nausea, heme positive stool   Post: gastritis, sloughing of distal esophagus mucosa   • GALLBLADDER SURGERY     • HEMORRHOIDECTOMY     • HYSTERECTOMY     • KIDNEY SURGERY  04/22/2013    Stent placement   • MAZE PROCEDURE     • MITRAL VALVE REPLACEMENT     • TONSILLECTOMY     • TRICUSPID VALVE REPLACEMENT       HEMATOLOGY HISTORY:  The patient is a 66 y.o. year old female patient with history of hypertension, emphysema secondary to long time cigarette smoking, currently on home O2 at 3 liters per minute, and history of both tricuspid valve repair and mitral valve replacement with tissue graft, and also history of GI bleeding in 2017, who presented on 4/9/2021 to the clinic for initial evaluation because of elevated ferritin and anemia. The patient is accompanied by her  who helped with history.     This patient had a history of GI bleeding, required hospitalization in Aug 2017 with a dora hemoglobin 8.0 on 8/15/2017.  Iron study on 8/13/2017 reported serum ferritin 51.4 ng/mL, free iron 25 TIBC 446 and iron saturation 6% with vitamin B12 level 892 pg/mL, and RBC folate 1935 ng/mL.     Subsequently patient had multiple ferritin study, reporting ferritin 96 ng/mL on 11/16/2017, ferritin 116.9 ng/mL and iron saturation 17% with free iron 61 TIBC 361 on 12/22/2017, RBC folate was 1609 and vitamin B12 at 855.  However hemoglobin was 10.8 and MCV 88.5 MCHC 32.0.   On 2/21/2018 ferritin was 145.6 ng/mL and a hemoglobin 10.2..  There is no iron labs in 2 3/8/2021 as mentioned above.     The  reports that the patient recently had positive stool occult blood test. She is scheduled  to see Cleveland Devine MD, and having both EGD and colonoscopy in the middle of 05/2021.  Her  also reports the patient was taking oral iron for about 3 years, and was recently discontinued because of elevated ferritin. The patient has chronic fatigue. She reports no visible blood per rectum, no melena. She denies dizziness or fainting.     Recent laboratory study on 03/09/2021 reported ferritin 794 ng/mL, chemistry lab reported creatinine 1.23 with normal electrolytes and glucose.  Laboratory studies on 03/08/2021 serum iron was 57, TIBC 346 and iron saturation 16% together with super therapeutic folate more than 20 ng/mL and vitamin B12 at 1018 pg/mL. Her hemoglobin was 10.7 and hematocrit 32.9.    Laboratory study on 4/9/2020 reported normal hemoglobin 12.5, MCV 93.6, MCHC 33.1 and hematocrit 37.8%. She has normal WBC 9490, including ANC 6520, lymphocytes 2270 and normal platelets 190,000.     I reviewed her peripheral blood smear, unremarkable, no evidence of hematologic malignancy. Morphology of RBC is normal. No target cells, no schistocytes, no angie cells. Morphology of WBC and platelets also normal.     Patient presented 6/4/2021 for urgent reevaluation, requested by her pulmonologist Dr. Melo because of worsening anemia with a hemoglobin 8.7 on 5/20/2021.  Patient is accompanied by her son today.    Patient presented originally on 4/9/2021 to our clinic for initial evaluation because of elevated ferritin and anemia.  Patient had a normal hemoglobin 12.5 at that time.    On 3/8/2021, she had hemoglobin 10.8, excellent folate > 20 ng/mL, vitamin B12 at 1018 ng/mL.  She had iron saturation 16% with free iron 57 and TIBC 346.  On 3/9/2021 she had elevated ferritin 794 ng/mL with a chemistry lab creatinine 1.23, eGFR 44 mL/min.    This patient had hospitalization in May 2021, because of significant hypokalemia.  She was admitted for management at the [facility].  During hospitalization, she  had a dora hemoglobin 8.8 on 5/11/2021.      Patient reports she had EGD and colonoscopy examination by Dr. Devine on 5/17/2021.  I reviewed procedure report and also pathology report.  There was esophagitis, no evidence for gastritis or duodenitis.  No evidence for malignancy.  Colonoscopy examination was benign except for sigmoid diverticula.    On 5/18/2021, patient had hemoglobin of 8.8, platelets 141,000 and WBC 7500 without differentiation.  On 5/20/2021 she had a hemoglobin 8.7, platelets 190,000, WBC 6440 including ANC 4920.    Earlier today, patient was seen by her pulmonologist Dr. Melo for reevaluation.  Because of decreasing hemoglobin, Dr. Melo urged patient to come in for reevaluation.    Patient reports that she has no melena hematochezia or other bleeding.  She has known dizziness or fainting.    Laboratory study study 6/4/2021 reported improved hemoglobin 10.1, maintains normal platelets 259,000, and WBC 10,420 including ANC 8530 lymphocytes 1330.        MEDICATIONS    Current Outpatient Medications:   •  albuterol (PROVENTIL) (2.5 MG/3ML) 0.083% nebulizer solution, INHALE 1 VIAL BY MOUTH EVERY 4 HOURS AS NEEDED FOR WHEEZING (Patient taking differently: Take 2.5 mg by nebulization Every 6 (Six) Hours As Needed for Wheezing (uses 2 to 3 times per day).), Disp: 150 mL, Rfl: 3  •  albuterol sulfate  (90 Base) MCG/ACT inhaler, Inhale 2 puffs Every 4 (Four) Hours As Needed for Wheezing., Disp: 18 g, Rfl: 3  •  amLODIPine (NORVASC) 10 MG tablet, TAKE 1 TABLET BY MOUTH EVERY DAY (Patient taking differently: Take 10 mg by mouth Daily.), Disp: 90 tablet, Rfl: 1  •  aspirin 81 MG EC tablet, Take 1 tablet by mouth Daily., Disp: , Rfl:   •  atorvastatin (LIPITOR) 40 MG tablet, TAKE 1 TABLET BY MOUTH EVERY DAY (Patient taking differently: Take 40 mg by mouth Daily.), Disp: 90 tablet, Rfl: 2  •  benzonatate (TESSALON) 100 MG capsule, TAKE 1 CAPSULE BY MOUTH 2 TIMES A DAY AS NEEDED FOR COUGH, Disp: 180  capsule, Rfl: 1  •  butalbital-acetaminophen-caffeine (FIORICET, ESGIC) -40 MG per tablet, Take 1 tablet by mouth Every 6 (Six) Hours As Needed for Headache., Disp: 4 tablet, Rfl: 0  •  carvedilol (COREG) 6.25 MG tablet, TAKE 1 TABLET BY MOUTH TWICE A DAY WITH MEALS, Disp: 180 tablet, Rfl: 2  •  cefTRIAXone (ROCEPHIN) 2 g injection, , Disp: , Rfl:   •  cyclobenzaprine (FLEXERIL) 10 MG tablet, TAKE 1 TABLET BY MOUTH AT BEDTIME (Patient taking differently: Take 10 mg by mouth every night at bedtime.), Disp: 90 tablet, Rfl: 3  •  fluticasone-salmeterol (Advair Diskus) 250-50 MCG/DOSE DISKUS, Inhale 1 puff 2 (Two) Times a Day., Disp: 3 each, Rfl: 3  •  furosemide (LASIX) 40 MG tablet, Take 1 tablet by mouth Daily. (Patient taking differently: Take 40 mg by mouth 2 (Two) Times a Day.), Disp: 30 tablet, Rfl: 3  •  guaiFENesin (MUCINEX) 600 MG 12 hr tablet, Take 600 mg by mouth 2 (Two) Times a Day., Disp: , Rfl:   •  HYDROcodone-acetaminophen (NORCO) 5-325 MG per tablet, Take 1 tablet by mouth Every 8 (Eight) Hours As Needed for Moderate Pain . Chronic pain medicine for low back pain, Disp: 90 tablet, Rfl: 0  •  lisinopril (PRINIVIL,ZESTRIL) 40 MG tablet, Take 40 mg by mouth Daily., Disp: , Rfl:   •  loratadine (Claritin) 10 MG tablet, Take 10 mg by mouth 2 (two) times a day., Disp: , Rfl:   •  magnesium oxide (MAG-OX) 400 MG tablet, TAKE 1 TABLET BY MOUTH EVERY DAY, Disp: 90 tablet, Rfl: 1  •  meclizine (ANTIVERT) 25 MG tablet, Take 1 tablet by mouth 3 (Three) Times a Day As Needed for Dizziness. prn, Disp: 30 tablet, Rfl: 3  •  Multiple Vitamins-Minerals (MULTIVITAL) tablet, Take 1 tablet by mouth Daily., Disp: , Rfl:   •  Nebulizer device, 1 Units 4 (Four) Times a Day As Needed (Wheezing). J44.1 j20.8, Disp: 1 each, Rfl: 0  •  O2 (OXYGEN), Inhale 3 L/min Continuous., Disp: , Rfl:   •  Omega-3 Fatty Acids (fish oil) 1000 MG capsule capsule, Take  by mouth Every Night., Disp: , Rfl:   •  omeprazole (priLOSEC) 40 MG  capsule, TAKE 1 CAPSULE BY MOUTH EVERY DAY (Patient taking differently: Take 40 mg by mouth Daily.), Disp: 90 capsule, Rfl: 1  •  ondansetron (ZOFRAN) 4 MG tablet, Take 1 tablet by mouth Every 12 (Twelve) Hours As Needed for Nausea or Vomiting., Disp: 90 tablet, Rfl: 3  •  penicillin G potassium 25089612 units injection, , Disp: , Rfl:   •  penicillin G potassium 4 Million Units in sodium chloride 0.9 % 100 mL IVPB, Infuse 4 Million Units into a venous catheter Every 4 (Four) Hours for 249 doses. Indications: Endocarditis, Disp: , Rfl:   •  polyethylene glycol (MIRALAX) packet, Take 17 g by mouth 2 (Two) Times a Day. (Patient taking differently: Take 17 g by mouth 2 (Two) Times a Day As Needed.), Disp: , Rfl:   •  potassium chloride 10 MEQ CR tablet, Take 1 tablet by mouth 2 (Two) Times a Day. 1 p.o. daily, Disp: 180 tablet, Rfl: 1  •  roflumilast (DALIRESP) 500 MCG tablet tablet, Take 1 tablet by mouth Daily., Disp: 90 tablet, Rfl: 0  •  rOPINIRole (REQUIP) 2 MG tablet, TAKE 1 TABLET BY MOUTH EVERY DAY EVERY NIGHT (Patient taking differently: Take 2 mg by mouth Every Night.), Disp: 90 tablet, Rfl: 2  •  sertraline (ZOLOFT) 100 MG tablet, TAKE 1 TABLET BY MOUTH EVERY DAY (Patient taking differently: Take 100 mg by mouth Daily.), Disp: 90 tablet, Rfl: 1  •  Sodium Chloride Flush (sodium chloride 0.9 % flush) 0.9 % solution, , Disp: , Rfl:   •  Symjepi 0.3 MG/0.3ML solution prefilled syringe, , Disp: , Rfl:   •  tiotropium (Spiriva HandiHaler) 18 MCG per inhalation capsule, Place 1 capsule into inhaler and inhale Daily., Disp: 90 capsule, Rfl: 3  •  zaleplon (SONATA) 10 MG capsule, TAKE 1 CAPSULE BY MOUTH EVERY NIGHT (Patient taking differently: Take 10 mg by mouth Every Night.), Disp: 30 capsule, Rfl: 3    ALLERGIES:     Allergies   Allergen Reactions   • Bupropion Itching   • Cephalexin Itching     Tolerated piperacillin/tazobactam, ampicillin, cefdinir, and ceftriaxone   • Metoprolol Itching       SOCIAL HISTORY:        Social History     Socioeconomic History   • Marital status:      Spouse name: Not on file   • Number of children: 2   • Years of education: Not on file   • Highest education level: Not on file   Tobacco Use   • Smoking status: Former Smoker     Quit date:      Years since quittin.5   • Smokeless tobacco: Never Used   Vaping Use   • Vaping Use: Never used   Substance and Sexual Activity   • Alcohol use: No     Comment: caffeine use   • Drug use: No   • Sexual activity: Defer         FAMILY HISTORY:  Family History   Adopted: Yes   Problem Relation Age of Onset   • No Known Problems Mother    • No Known Problems Father    · Adopted.  No details.  Only brother  of COVID in his 80's.  Mother probably had TB when giving birth to her.     REVIEW OF SYSTEMS:  Review of Systems   Constitutional: Positive for activity change (Due to exertional dyspnea) and fatigue (This has improved). Negative for appetite change, diaphoresis, fever and unexpected weight change.   HENT: Positive for dental problem (Has dentures) and hearing loss. Negative for nosebleeds and sore throat.         Dry mouth   Eyes: Negative for visual disturbance.   Respiratory: Positive for shortness of breath (On home O2 3 L/min). Negative for cough and wheezing.    Cardiovascular: Positive for chest pain. Negative for leg swelling.   Gastrointestinal: Negative for abdominal pain, anal bleeding, blood in stool and nausea.   Endocrine: Negative for cold intolerance.   Genitourinary: Negative for dysuria and hematuria.   Musculoskeletal: Positive for back pain. Negative for arthralgias and joint swelling.   Skin: Negative for color change and pallor.        Dry skin and pruritus   Neurological: Positive for dizziness (Vertigo) and numbness. Negative for speech difficulty, light-headedness and headaches.   Hematological: Negative for adenopathy. Bruises/bleeds easily.   Psychiatric/Behavioral: Negative for agitation and confusion.         "    Vitals:    07/30/21 1554   BP: (!) 184/75   Pulse: 70   Resp: 18   Temp: 98 °F (36.7 °C)   TempSrc: Temporal   SpO2: 96%   Weight: 58.4 kg (128 lb 12.8 oz)   Height: 170.2 cm (67.01\")   PainSc: 0-No pain   ECOG 3       PHYSICAL EXAM:    GENERAL:  Well-developed, thin female in no acute distress.  Orientated to time place and the people.  SKIN:  Warm, dry without rashes, purpura or petechiae.  HEENT:  Normocephalic.  Wearing mask.   LYMPHATICS:  No cervical, supraclavicular adenopathy.  CHEST: Normal respiratory effort.  Lungs clear to auscultation. Good airflow.  CARDIAC: Irregular rhythm, rate controlled.  Normal S1,S2.  ABDOMEN:  Soft, nontender with no organomegaly or masses.  Bowel sounds normal.  EXTREMITIES:  No clubbing, cyanosis or edema.  Left upper arm has PICC line in place.  NEUROLOGICAL:  Grossly intact.  No focal neurological deficits.  PSYCHIATRIC:  Normal affect and mood.        RECENT LABS:  WBC   Date Value Ref Range Status   07/30/2021 8.69 3.40 - 10.80 10*3/mm3 Final   03/30/2021 7.07 3.40 - 10.80 10*3/mm3 Final     RBC   Date Value Ref Range Status   07/30/2021 4.44 3.77 - 5.28 10*6/mm3 Final   03/30/2021 3.71 (L) 3.77 - 5.28 10*6/mm3 Final     Hemoglobin   Date Value Ref Range Status   07/30/2021 12.0 12.0 - 15.9 g/dL Final     Hematocrit   Date Value Ref Range Status   07/30/2021 39.1 34.0 - 46.6 % Final     MCV   Date Value Ref Range Status   07/30/2021 88.1 79.0 - 97.0 fL Final     MCH   Date Value Ref Range Status   07/30/2021 27.0 26.6 - 33.0 pg Final     MCHC   Date Value Ref Range Status   07/30/2021 30.7 (L) 31.5 - 35.7 g/dL Final     RDW   Date Value Ref Range Status   07/30/2021 14.6 12.3 - 15.4 % Final     RDW-SD   Date Value Ref Range Status   07/30/2021 47.1 37.0 - 54.0 fl Final     MPV   Date Value Ref Range Status   07/30/2021 10.1 6.0 - 12.0 fL Final     Platelets   Date Value Ref Range Status   07/30/2021 302 140 - 450 10*3/mm3 Final     Neutrophil %   Date Value Ref Range " Status   07/30/2021 66.9 42.7 - 76.0 % Final     Lymphocyte %   Date Value Ref Range Status   07/30/2021 19.1 (L) 19.6 - 45.3 % Final     Monocyte %   Date Value Ref Range Status   07/30/2021 4.5 (L) 5.0 - 12.0 % Final     Eosinophil %   Date Value Ref Range Status   07/30/2021 8.4 (H) 0.3 - 6.2 % Final     Basophil %   Date Value Ref Range Status   07/30/2021 0.9 0.0 - 1.5 % Final     Immature Grans %   Date Value Ref Range Status   07/30/2021 0.2 0.0 - 0.5 % Final     Neutrophils, Absolute   Date Value Ref Range Status   07/30/2021 5.81 1.70 - 7.00 10*3/mm3 Final     Lymphocytes, Absolute   Date Value Ref Range Status   07/30/2021 1.66 0.70 - 3.10 10*3/mm3 Final     Monocytes, Absolute   Date Value Ref Range Status   07/30/2021 0.39 0.10 - 0.90 10*3/mm3 Final     Eosinophils, Absolute   Date Value Ref Range Status   07/30/2021 0.73 (H) 0.00 - 0.40 10*3/mm3 Final     Basophils, Absolute   Date Value Ref Range Status   07/30/2021 0.08 0.00 - 0.20 10*3/mm3 Final     Immature Grans, Absolute   Date Value Ref Range Status   07/30/2021 0.02 0.00 - 0.05 10*3/mm3 Final     nRBC   Date Value Ref Range Status   07/30/2021 0.0 0.0 - 0.2 /100 WBC Final     Lab Results   Component Value Date    IRON 32 (L) 06/04/2021    TIBC 291 06/04/2021    FERRITIN 395.90 (H) 06/04/2021   Iron saturation 11% 6/4/2021.    Lab Results   Component Value Date    IRON 37 07/30/2021    TIBC 283 07/30/2021    FERRITIN 488.20 (H) 07/30/2021   Iron saturation 13% 7/30/2021.      Lab Results   Component Value Date    GLUCOSE 58 (L) 07/26/2021    BUN 9 07/26/2021    CREATININE 0.85 07/26/2021    EGFRIFNONA 67 07/26/2021    EGFRIFAFRI 86 07/12/2021    BCR 10.6 07/26/2021    K 3.4 (L) 07/26/2021    CO2 30.5 (H) 07/26/2021    CALCIUM 9.7 07/26/2021    ALBUMIN 4.20 07/02/2021    AST 20 07/02/2021    ALT 18 07/02/2021         Assessment/Plan   Anemia with elevated ferritin.    · Patient is a 65 years old female with history of iron deficiency anemia and GI  bleeding in 2017, for which she has been taking oral iron for the past 2-3 years according to her .   Since that time she has multiple iron studies with gradually improving ferritin level.   · On 3/9/2021 when she had significant elevated ferritin 794 ng/mL, with borderline low iron saturation 16% and anemia Hb 10.7.   · Discussed with the patient and her , this patient has no evidence of iron deficiency at this point based on her recent iron study on 03/09/2021 and 03/08/2021.  Instead her elevated ferritin indicates that she has anemia likely due to chronic inflammatory disease.  At the meantime, she had super therapeutic B12 level and folic acid level. She is taking oral multivitamin.   · This patient also has mild renal insufficiency, by calculation her creatinine clearance falls in the category of stage III chronic renal insufficiency.  Nevertheless patient is not a candidate for erythropoietin treatment.   · Patient had a normal hemoglobin 12.5 on 4/9/2021.   · She had hospitalization in May 2021 because of hyperkalemia.  During hospitalization, her hemoglobin dora was 10.8 on 5/11/2021.  · The patient reports positive occult blood from stool sample recently.    · Patient had EGD and colonoscopy examination on 5/17/2021.  No apparent bleeding.  Biopsy showed esophagitis otherwise unremarkable.    · Laboratory study on 6/4/2021 reported improved hemoglobin however still anemic with Hb 10.1.  Iron studies reported ferritin 395 and iron saturation 11% with free iron 33 TIBC 291.  Chemistry lab reported much improved renal function with creatinine 0.88.  Her iron study fits with anemia of chronic inflammatory disease.  · On 7/30/2021, patient has improved hemoglobin 12.0.  And the iron studies showed ferritin 488 ng/mL, iron saturation 13%.  Patient continues on antibiotics, she feels better.  We discussed that and will continue to monitor her hemoglobin and iron panel in 6 months.      PLAN:    1. Continue extended IV antibiotics.   2. No need for iron treatment.  3. I will bring the patient back for reevaluation in 6 months,  repeating iron study and CBC.     I reviewed her records about 2 recent hospitalization in June and July 2021.    Discussed with the patient and her son, they voiced understanding.       MATTHIAS NAIR M.D., Ph.D.    7/30/2021    CC:   MD Cleveland Johnston M.D.     Ag Melo MD      Dictated using Dragon Dictation.

## 2021-07-31 PROBLEM — D64.9 ANEMIA: Status: RESOLVED | Noted: 2021-05-10 | Resolved: 2021-07-31

## 2021-08-01 VITALS
WEIGHT: 130 LBS | BODY MASS INDEX: 20.36 KG/M2 | RESPIRATION RATE: 18 BRPM | OXYGEN SATURATION: 97 % | DIASTOLIC BLOOD PRESSURE: 64 MMHG | SYSTOLIC BLOOD PRESSURE: 112 MMHG | TEMPERATURE: 98.2 F | HEART RATE: 74 BPM

## 2021-08-02 ENCOUNTER — HOME CARE VISIT (OUTPATIENT)
Dept: HOME HEALTH SERVICES | Facility: HOME HEALTHCARE | Age: 66
End: 2021-08-02

## 2021-08-02 ENCOUNTER — LAB REQUISITION (OUTPATIENT)
Dept: LAB | Facility: HOSPITAL | Age: 66
End: 2021-08-02

## 2021-08-02 DIAGNOSIS — Z00.00 ENCOUNTER FOR GENERAL ADULT MEDICAL EXAMINATION WITHOUT ABNORMAL FINDINGS: ICD-10-CM

## 2021-08-02 LAB
BASOPHILS # BLD AUTO: 0.07 10*3/MM3 (ref 0–0.2)
BASOPHILS NFR BLD AUTO: 1 % (ref 0–1.5)
CREAT SERPL-MCNC: 0.83 MG/DL (ref 0.57–1)
DEPRECATED RDW RBC AUTO: 45.2 FL (ref 37–54)
EOSINOPHIL # BLD AUTO: 0.74 10*3/MM3 (ref 0–0.4)
EOSINOPHIL NFR BLD AUTO: 10.3 % (ref 0.3–6.2)
ERYTHROCYTE [DISTWIDTH] IN BLOOD BY AUTOMATED COUNT: 14.6 % (ref 12.3–15.4)
GFR SERPL CREATININE-BSD FRML MDRD: 68 ML/MIN/1.73
HCT VFR BLD AUTO: 33.6 % (ref 34–46.6)
HGB BLD-MCNC: 10.5 G/DL (ref 12–15.9)
IMM GRANULOCYTES # BLD AUTO: 0.01 10*3/MM3 (ref 0–0.05)
IMM GRANULOCYTES NFR BLD AUTO: 0.1 % (ref 0–0.5)
LYMPHOCYTES # BLD AUTO: 1.58 10*3/MM3 (ref 0.7–3.1)
LYMPHOCYTES NFR BLD AUTO: 22.1 % (ref 19.6–45.3)
MCH RBC QN AUTO: 26.6 PG (ref 26.6–33)
MCHC RBC AUTO-ENTMCNC: 31.3 G/DL (ref 31.5–35.7)
MCV RBC AUTO: 85.3 FL (ref 79–97)
MONOCYTES # BLD AUTO: 0.4 10*3/MM3 (ref 0.1–0.9)
MONOCYTES NFR BLD AUTO: 5.6 % (ref 5–12)
NEUTROPHILS NFR BLD AUTO: 4.36 10*3/MM3 (ref 1.7–7)
NEUTROPHILS NFR BLD AUTO: 60.9 % (ref 42.7–76)
NRBC BLD AUTO-RTO: 0 /100 WBC (ref 0–0.2)
PLATELET # BLD AUTO: 238 10*3/MM3 (ref 140–450)
PMV BLD AUTO: 11.2 FL (ref 6–12)
RBC # BLD AUTO: 3.94 10*6/MM3 (ref 3.77–5.28)
WBC # BLD AUTO: 7.16 10*3/MM3 (ref 3.4–10.8)

## 2021-08-02 PROCEDURE — G0299 HHS/HOSPICE OF RN EA 15 MIN: HCPCS

## 2021-08-02 PROCEDURE — 85025 COMPLETE CBC W/AUTO DIFF WBC: CPT | Performed by: INTERNAL MEDICINE

## 2021-08-02 PROCEDURE — 82565 ASSAY OF CREATININE: CPT | Performed by: INTERNAL MEDICINE

## 2021-08-03 VITALS
SYSTOLIC BLOOD PRESSURE: 104 MMHG | DIASTOLIC BLOOD PRESSURE: 62 MMHG | TEMPERATURE: 98 F | RESPIRATION RATE: 18 BRPM | HEART RATE: 65 BPM | OXYGEN SATURATION: 97 % | BODY MASS INDEX: 20.12 KG/M2 | WEIGHT: 128.5 LBS

## 2021-08-09 ENCOUNTER — HOME CARE VISIT (OUTPATIENT)
Dept: HOME HEALTH SERVICES | Facility: HOME HEALTHCARE | Age: 66
End: 2021-08-09

## 2021-08-09 ENCOUNTER — LAB REQUISITION (OUTPATIENT)
Dept: LAB | Facility: HOSPITAL | Age: 66
End: 2021-08-09

## 2021-08-09 DIAGNOSIS — I33.0 ACUTE AND SUBACUTE INFECTIVE ENDOCARDITIS: ICD-10-CM

## 2021-08-09 LAB
BASOPHILS # BLD AUTO: 0.06 10*3/MM3 (ref 0–0.2)
BASOPHILS NFR BLD AUTO: 0.9 % (ref 0–1.5)
CREAT SERPL-MCNC: 0.71 MG/DL (ref 0.57–1)
DEPRECATED RDW RBC AUTO: 44.1 FL (ref 37–54)
EOSINOPHIL # BLD AUTO: 0.47 10*3/MM3 (ref 0–0.4)
EOSINOPHIL NFR BLD AUTO: 7.3 % (ref 0.3–6.2)
ERYTHROCYTE [DISTWIDTH] IN BLOOD BY AUTOMATED COUNT: 14.4 % (ref 12.3–15.4)
GFR SERPL CREATININE-BSD FRML MDRD: 82 ML/MIN/1.73
HCT VFR BLD AUTO: 33.1 % (ref 34–46.6)
HGB BLD-MCNC: 10.2 G/DL (ref 12–15.9)
IMM GRANULOCYTES # BLD AUTO: 0.03 10*3/MM3 (ref 0–0.05)
IMM GRANULOCYTES NFR BLD AUTO: 0.5 % (ref 0–0.5)
LYMPHOCYTES # BLD AUTO: 1.52 10*3/MM3 (ref 0.7–3.1)
LYMPHOCYTES NFR BLD AUTO: 23.6 % (ref 19.6–45.3)
MCH RBC QN AUTO: 26.4 PG (ref 26.6–33)
MCHC RBC AUTO-ENTMCNC: 30.8 G/DL (ref 31.5–35.7)
MCV RBC AUTO: 85.5 FL (ref 79–97)
MONOCYTES # BLD AUTO: 0.37 10*3/MM3 (ref 0.1–0.9)
MONOCYTES NFR BLD AUTO: 5.7 % (ref 5–12)
NEUTROPHILS NFR BLD AUTO: 4 10*3/MM3 (ref 1.7–7)
NEUTROPHILS NFR BLD AUTO: 62 % (ref 42.7–76)
NRBC BLD AUTO-RTO: 0 /100 WBC (ref 0–0.2)
PLATELET # BLD AUTO: 234 10*3/MM3 (ref 140–450)
PMV BLD AUTO: 10.9 FL (ref 6–12)
RBC # BLD AUTO: 3.87 10*6/MM3 (ref 3.77–5.28)
WBC # BLD AUTO: 6.45 10*3/MM3 (ref 3.4–10.8)

## 2021-08-09 PROCEDURE — 85025 COMPLETE CBC W/AUTO DIFF WBC: CPT | Performed by: INTERNAL MEDICINE

## 2021-08-09 PROCEDURE — G0299 HHS/HOSPICE OF RN EA 15 MIN: HCPCS

## 2021-08-09 PROCEDURE — 82565 ASSAY OF CREATININE: CPT | Performed by: INTERNAL MEDICINE

## 2021-08-11 ENCOUNTER — OFFICE VISIT (OUTPATIENT)
Dept: CARDIOLOGY | Facility: CLINIC | Age: 66
End: 2021-08-11

## 2021-08-11 VITALS
WEIGHT: 129 LBS | SYSTOLIC BLOOD PRESSURE: 142 MMHG | TEMPERATURE: 98 F | DIASTOLIC BLOOD PRESSURE: 62 MMHG | RESPIRATION RATE: 18 BRPM | HEART RATE: 68 BPM | BODY MASS INDEX: 20.2 KG/M2 | OXYGEN SATURATION: 97 %

## 2021-08-11 VITALS
HEIGHT: 67 IN | HEART RATE: 65 BPM | WEIGHT: 131 LBS | DIASTOLIC BLOOD PRESSURE: 56 MMHG | BODY MASS INDEX: 20.56 KG/M2 | SYSTOLIC BLOOD PRESSURE: 120 MMHG

## 2021-08-11 DIAGNOSIS — I48.0 PAF (PAROXYSMAL ATRIAL FIBRILLATION) (HCC): ICD-10-CM

## 2021-08-11 DIAGNOSIS — Z98.890 S/P TVR (TRICUSPID VALVE REPAIR): ICD-10-CM

## 2021-08-11 DIAGNOSIS — Z95.2 S/P MVR (MITRAL VALVE REPLACEMENT): Primary | ICD-10-CM

## 2021-08-11 DIAGNOSIS — J44.1 COPD EXACERBATION (HCC): ICD-10-CM

## 2021-08-11 DIAGNOSIS — I05.9 ENDOCARDITIS OF MITRAL VALVE: ICD-10-CM

## 2021-08-11 PROCEDURE — 93000 ELECTROCARDIOGRAM COMPLETE: CPT | Performed by: NURSE PRACTITIONER

## 2021-08-11 PROCEDURE — 99214 OFFICE O/P EST MOD 30 MIN: CPT | Performed by: NURSE PRACTITIONER

## 2021-08-11 NOTE — PROGRESS NOTES
Date of Office Visit: 21  Encounter Provider: BA Charles  Place of Service: [facility] CARDIOLOGY  Patient Name: Paz Browne  :1955    Chief Complaint   Patient presents with   • S/P MVR (mitral valve replacement)   • Chest Pain   • Follow-up   :     HPI: Paz Browne is a 66 y.o. female  with history of paroxysmal atrial flutter/fibrillation, status post tricuspid valve repair, status post mitral valve replacement, COPD, hypertension, pulmonary hypertension, and GI bleed.     She was hospitalized with progressive shortness of breath in 2014.  At that time she was found to be in rapid atrial fibrillation with some congestive heart failure.  Echocardiogram revealed normal left ventricular systolic function with severely dilated left atrium.  The right ventricle was moderately enlarged.  She had severe mitral and tricuspid insufficiency with severe pulmonary hypertension.  She was diuresed and rate controlled. CHIO confirmed severe mitral and severe tricuspid regurgitation.  She had cardiac catheterization which showed no significant coronary artery disease with severe mitral insufficiency.  She underwent urgent mitral valve repair using a #26 annuloplasty ring which was unsuccessful and had to be replaced with a #31 mm porcine [employer]. Leopoldo prostheses.  She also had tricuspid valve repair and a right and left CryoMaze procedure with closure of the atrial appendage.  She did well postoperatively.  She has some transient will insufficiency and some junctional arrhythmia.  She was later found to be bradycardic and then went back into atrial fibrillation and was started on warfarin.  In 2014 she presented with tachycardia and shortness of breath and was in atrial flutter.  Her carvedilol was increased she was later started on amiodarone.  She underwent electrocardioversion in 2014.  In 2016 she had progressive dyspnea and was admitted to  the hospital.  Echocardiogram showed normal left ventricular systolic function and her mitral valve replacement and tricuspid valve repair appeared normal.  She had marked pulmonary hypertension and some diffuse T-wave inversion in the precordial leads.  ProBNP was mildly elevated.  Cardiac catheterization showed normal coronary arteries.  Right and left sided filling pressures were normal.  She had moderate pulmonary hypertension with normal wedge pressure.  She did not receive vasodilators and was later followed with pulmonary and initiated on oxygen therapy.  In August 2017 she had some GI bleeding.  She had a polypectomy also had some gastritis and hemorrhoids.  She was removed from warfarin and then kept off warfarin and she maintained sinus rhythm.  She was admitted in January 2018 with some right-sided pneumonia and viral infection and treated for that.      She had acute hyperkalemia May 2021 which was treated.  She then was admitted again with COPD exacerbation, positive for coronavirus (not COVID- 19) and fever in June 2021.  She had some issues with hypertension.  There was some concern of endocarditis.  She had a transthoracic echocardiogram which showed ejection fraction 50%, bioprosthetic mitral valve which appeared normal.  There was moderate tricuspid regurgitation with markedly elevated RVSP at 83 mmHg.  She was treatedand then re- with home health admitted again July 2021 with recurrent strep-bacteremia.  She had transesophageal echocardiogram 7/6/2021 which showed normal left ventricular systolic function, mild calcification of aortic valve with trace aortic valve regurgitation, bioprosthetic mitral valve with echo bright structure on the anterior surface that was suspicious for vegetation.  There was moderate plaque in the aortic arch and and descending aorta. She was discharged with home health for IV antibiotic infusion.  CT surgery evaluated and recommended IV antibiotic sand 3 month oral  "antibiotic then  repeat imaging of valve.    Patient presents.  She required manipulation of her PICC line so her date to an IV antibiotic has been postponed.  She is walking 10-15 minutes a day to stay active.  She has no chest pain, no edema, no palpitations, near-syncope or syncope.  She has some fatigue but is lingering.  She reports both good and bad days.  She sometimes just wants to \"lay around and not do anything\".         Allergies   Allergen Reactions   • Bupropion Itching   • Cephalexin Itching     Tolerated piperacillin/tazobactam, ampicillin, cefdinir, and ceftriaxone   • Metoprolol Itching           Family and social history reviewed.     ROS  All other systems were reviewed and are negative          Objective:     Vitals:    08/11/21 1253   BP: 120/56   BP Location: Right arm   Patient Position: Sitting   Pulse: 65   Weight: 59.4 kg (131 lb)   Height: 170.2 cm (67\")     Body mass index is 20.52 kg/m².    PHYSICAL EXAM:  Constitutional:       General: Not in acute distress.     Appearance: Well-developed. Not diaphoretic.   HENT:      Head: Normocephalic.   Pulmonary:      Effort: Pulmonary effort is normal. No respiratory distress.      Breath sounds: Normal breath sounds. No wheezing. No rhonchi. No rales.   Cardiovascular:      Normal rate. Regular rhythm.      Murmurs: There is a systolic murmur.   Pulses:     Radial: 2+ bilaterally.  Skin:     General: Skin is warm and dry. There is no cyanosis.      Findings: No rash.   Neurological:      Mental Status: Alert and oriented to person, place, and time.   Psychiatric:         Behavior: Behavior normal.         Thought Content: Thought content normal.         Judgment: Judgment normal.           ECG 12 Lead    Date/Time: 8/11/2021 1:41 PM  Performed by: Melanie Sykes APRN  Authorized by: Melanie Sykes APRN   Comparison: compared with previous ECG   Similar to previous ECG  Rhythm: sinus rhythm  Ectopy: atrial premature contractions  Rate: " normal  QRS axis: normal                Current Outpatient Medications   Medication Sig Dispense Refill   • albuterol (PROVENTIL) (2.5 MG/3ML) 0.083% nebulizer solution INHALE 1 VIAL BY MOUTH EVERY 4 HOURS AS NEEDED FOR WHEEZING (Patient taking differently: Take 2.5 mg by nebulization Every 6 (Six) Hours As Needed for Wheezing (uses 2 to 3 times per day).) 150 mL 3   • albuterol sulfate  (90 Base) MCG/ACT inhaler Inhale 2 puffs Every 4 (Four) Hours As Needed for Wheezing. 18 g 3   • amLODIPine (NORVASC) 10 MG tablet TAKE 1 TABLET BY MOUTH EVERY DAY (Patient taking differently: Take 10 mg by mouth Daily.) 90 tablet 1   • aspirin 81 MG EC tablet Take 1 tablet by mouth Daily.     • atorvastatin (LIPITOR) 40 MG tablet TAKE 1 TABLET BY MOUTH EVERY DAY (Patient taking differently: Take 40 mg by mouth Daily.) 90 tablet 2   • benzonatate (TESSALON) 100 MG capsule TAKE 1 CAPSULE BY MOUTH 2 TIMES A DAY AS NEEDED FOR COUGH 180 capsule 1   • butalbital-acetaminophen-caffeine (FIORICET, ESGIC) -40 MG per tablet Take 1 tablet by mouth Every 6 (Six) Hours As Needed for Headache. 4 tablet 0   • carvedilol (COREG) 6.25 MG tablet TAKE 1 TABLET BY MOUTH TWICE A DAY WITH MEALS 180 tablet 2   • cefTRIAXone (ROCEPHIN) 2 g injection      • cyclobenzaprine (FLEXERIL) 10 MG tablet TAKE 1 TABLET BY MOUTH AT BEDTIME (Patient taking differently: Take 10 mg by mouth every night at bedtime.) 90 tablet 3   • fluticasone-salmeterol (Advair Diskus) 250-50 MCG/DOSE DISKUS Inhale 1 puff 2 (Two) Times a Day. 3 each 3   • furosemide (LASIX) 40 MG tablet Take 1 tablet by mouth Daily. (Patient taking differently: Take 40 mg by mouth 2 (Two) Times a Day.) 30 tablet 3   • guaiFENesin (MUCINEX) 600 MG 12 hr tablet Take 600 mg by mouth 2 (Two) Times a Day.     • HYDROcodone-acetaminophen (NORCO) 5-325 MG per tablet Take 1 tablet by mouth Every 8 (Eight) Hours As Needed for Moderate Pain . Chronic pain medicine for low back pain 90 tablet 0   •  lisinopril (PRINIVIL,ZESTRIL) 40 MG tablet Take 40 mg by mouth Daily.     • loratadine (Claritin) 10 MG tablet Take 10 mg by mouth 2 (two) times a day.     • magnesium oxide (MAG-OX) 400 MG tablet TAKE 1 TABLET BY MOUTH EVERY DAY 90 tablet 1   • meclizine (ANTIVERT) 25 MG tablet Take 1 tablet by mouth 3 (Three) Times a Day As Needed for Dizziness. prn 30 tablet 3   • Multiple Vitamins-Minerals (MULTIVITAL) tablet Take 1 tablet by mouth Daily.     • Nebulizer device 1 Units 4 (Four) Times a Day As Needed (Wheezing). J44.1 j20.8 1 each 0   • O2 (OXYGEN) Inhale 3 L/min Continuous.     • Omega-3 Fatty Acids (fish oil) 1000 MG capsule capsule Take  by mouth Every Night.     • omeprazole (priLOSEC) 40 MG capsule TAKE 1 CAPSULE BY MOUTH EVERY DAY (Patient taking differently: Take 40 mg by mouth Daily.) 90 capsule 1   • ondansetron (ZOFRAN) 4 MG tablet Take 1 tablet by mouth Every 12 (Twelve) Hours As Needed for Nausea or Vomiting. 90 tablet 3   • polyethylene glycol (MIRALAX) packet Take 17 g by mouth 2 (Two) Times a Day. (Patient taking differently: Take 17 g by mouth 2 (Two) Times a Day As Needed.)     • potassium chloride 10 MEQ CR tablet Take 1 tablet by mouth 2 (Two) Times a Day. 1 p.o. daily 180 tablet 1   • roflumilast (DALIRESP) 500 MCG tablet tablet Take 1 tablet by mouth Daily. 90 tablet 0   • rOPINIRole (REQUIP) 2 MG tablet TAKE 1 TABLET BY MOUTH EVERY DAY EVERY NIGHT (Patient taking differently: Take 2 mg by mouth Every Night.) 90 tablet 2   • sertraline (ZOLOFT) 100 MG tablet TAKE 1 TABLET BY MOUTH EVERY DAY (Patient taking differently: Take 100 mg by mouth Daily.) 90 tablet 1   • Sodium Chloride Flush (sodium chloride 0.9 % flush) 0.9 % solution      • Symjepi 0.3 MG/0.3ML solution prefilled syringe      • tiotropium (Spiriva HandiHaler) 18 MCG per inhalation capsule Place 1 capsule into inhaler and inhale Daily. 90 capsule 3   • zaleplon (SONATA) 10 MG capsule TAKE 1 CAPSULE BY MOUTH EVERY NIGHT (Patient  taking differently: Take 10 mg by mouth Every Night.) 30 capsule 3   • penicillin G potassium [id number] units injection      • penicillin G potassium 4 Million Units in sodium chloride 0.9 % 100 mL IVPB Infuse 4 Million Units into a venous catheter Every 4 (Four) Hours for 249 doses. Indications: Endocarditis       No current facility-administered medications for this visit.     Assessment:       Diagnosis Plan   1. S/P MVR (mitral valve replacement)     2. S/P TVR (tricuspid valve repair)     3. COPD exacerbation (CMS/[facility])     4. PAF (paroxysmal atrial fibrillation) (CMS/[facility])     5. Endocarditis of mitral valve          Orders Placed This Encounter   Procedures   • ECG 12 Lead     This order was created via procedure documentation     Order Specific Question:   Release to patient     Answer:   Immediate         Plan:       1.  66 year old female with history of severe mitral insufficiency status post mitral valve replacement with a tissue valve and tricuspid valve repair August 2014.  CHIO consistent with probable mitral valve endocarditis with small paravalvular leak. 07/2021 antibiotic treated2.  Hypertension blood pressure if anything on the low side  3. History of paroxysmal atrial fibrillation status post CRYO MAZE/ ALEJANDRA ligation 08/2014 recurrent atrial flutter/fibrillation status post cardioversion October 2014. No anticoagulation due to GI Bleeding  4.COPD with chronic hypoxemia on oxygen therapy follows with Dr. Melo  5.   History of recurrent lung infection. Coronavirus (not COVID-19) infection 06/2021  6.  Hyperlipidemia on target dose atorvastatin continue the same  7.  Pulmonary hypertension she follows with pulmonology with Dr. Anatoliy Melo  8. Renal insufficiency- creatinine 1.23, GFR 44  In  03/2021  9. History of GI bleeding- need for bidirectional scope, see below  10. History of Vertigo  11. Recurrent strep bacteremia.    CHIO consistent with probable mitral valve endocarditis with small  paravalvular leak.  CT surgery recommended completing IV antibiotic in 3-month oral antibiotic per infectious disease.  She is still on IV antibiotic.  We will bring her back in 3 months.  At that time she will meet with Dr. Null who last consulted on her inpatient who she would like to see in office in the future as she is a former Dr. Gan patient.  We will make arrangement for follow-up TTE versus CHIO at that time.    Call with questions or concerns.               It has been a pleasure to participate in this patient's care.      Thank you,  BA Charles      **I used Dragon to dictate this note:**

## 2021-08-12 ENCOUNTER — PATIENT MESSAGE (OUTPATIENT)
Dept: CARDIOLOGY | Facility: CLINIC | Age: 66
End: 2021-08-12

## 2021-08-12 DIAGNOSIS — Z79.891 LONG TERM CURRENT USE OF OPIATE ANALGESIC: ICD-10-CM

## 2021-08-12 RX ORDER — HYDROCODONE BITARTRATE AND ACETAMINOPHEN 5; 325 MG/1; MG/1
1 TABLET ORAL EVERY 8 HOURS PRN
Qty: 90 TABLET | Refills: 0 | Status: SHIPPED | OUTPATIENT
Start: 2021-08-12 | End: 2021-09-14 | Stop reason: SDUPTHER

## 2021-08-12 NOTE — TELEPHONE ENCOUNTER
From: Paz Browne  To: BA Charles  Sent: 8/12/2021 2:14 PM EDT  Subject: Test Results Question    Yes I'm sorry I missed your call. I was wondering if everything was ok with the ekg thank you

## 2021-08-12 NOTE — TELEPHONE ENCOUNTER
Caller: Paz Browne    Relationship: Self    Best call back number: [phone]    Medication needed:   Requested Prescriptions     Pending Prescriptions Disp Refills   • HYDROcodone-acetaminophen (NORCO) 5-325 MG per tablet 90 tablet 0     Sig: Take 1 tablet by mouth Every 8 (Eight) Hours As Needed for Moderate Pain . Chronic pain medicine for low back pain       When do you need the refill by: 08/14    What additional details did the patient provide when requesting the medication: PATIENT HAS A 2 DAY SUPPLY    Does the patient have less than a 3 day supply:  [x] Yes  [] No    What is the patient's preferred pharmacy: [id number] - [place], [place] - [address] [place] [address] - [phone]  - [phone] FX

## 2021-08-13 ENCOUNTER — TRANSCRIBE ORDERS (OUTPATIENT)
Dept: ADMINISTRATIVE | Facility: HOSPITAL | Age: 66
End: 2021-08-13

## 2021-08-13 DIAGNOSIS — R91.1 LUNG NODULE: Primary | ICD-10-CM

## 2021-08-16 ENCOUNTER — HOME CARE VISIT (OUTPATIENT)
Dept: HOME HEALTH SERVICES | Facility: HOME HEALTHCARE | Age: 66
End: 2021-08-16

## 2021-08-16 ENCOUNTER — LAB REQUISITION (OUTPATIENT)
Dept: LAB | Facility: HOSPITAL | Age: 66
End: 2021-08-16

## 2021-08-16 DIAGNOSIS — I33.0 ACUTE AND SUBACUTE INFECTIVE ENDOCARDITIS: ICD-10-CM

## 2021-08-16 LAB
BASOPHILS # BLD AUTO: 0.05 10*3/MM3 (ref 0–0.2)
BASOPHILS NFR BLD AUTO: 0.7 % (ref 0–1.5)
CREAT SERPL-MCNC: 0.91 MG/DL (ref 0.57–1)
DEPRECATED RDW RBC AUTO: 45.1 FL (ref 37–54)
EOSINOPHIL # BLD AUTO: 0.42 10*3/MM3 (ref 0–0.4)
EOSINOPHIL NFR BLD AUTO: 5.8 % (ref 0.3–6.2)
ERYTHROCYTE [DISTWIDTH] IN BLOOD BY AUTOMATED COUNT: 14.4 % (ref 12.3–15.4)
GFR SERPL CREATININE-BSD FRML MDRD: 61 ML/MIN/1.73
HCT VFR BLD AUTO: 34.8 % (ref 34–46.6)
HGB BLD-MCNC: 10.9 G/DL (ref 12–15.9)
IMM GRANULOCYTES # BLD AUTO: 0.02 10*3/MM3 (ref 0–0.05)
IMM GRANULOCYTES NFR BLD AUTO: 0.3 % (ref 0–0.5)
LYMPHOCYTES # BLD AUTO: 1.38 10*3/MM3 (ref 0.7–3.1)
LYMPHOCYTES NFR BLD AUTO: 19.2 % (ref 19.6–45.3)
MCH RBC QN AUTO: 26.8 PG (ref 26.6–33)
MCHC RBC AUTO-ENTMCNC: 31.3 G/DL (ref 31.5–35.7)
MCV RBC AUTO: 85.5 FL (ref 79–97)
MONOCYTES # BLD AUTO: 0.34 10*3/MM3 (ref 0.1–0.9)
MONOCYTES NFR BLD AUTO: 4.7 % (ref 5–12)
NEUTROPHILS NFR BLD AUTO: 4.98 10*3/MM3 (ref 1.7–7)
NEUTROPHILS NFR BLD AUTO: 69.3 % (ref 42.7–76)
NRBC BLD AUTO-RTO: 0 /100 WBC (ref 0–0.2)
PLATELET # BLD AUTO: 224 10*3/MM3 (ref 140–450)
PMV BLD AUTO: 11.1 FL (ref 6–12)
RBC # BLD AUTO: 4.07 10*6/MM3 (ref 3.77–5.28)
WBC # BLD AUTO: 7.19 10*3/MM3 (ref 3.4–10.8)

## 2021-08-16 PROCEDURE — 82565 ASSAY OF CREATININE: CPT | Performed by: INTERNAL MEDICINE

## 2021-08-16 PROCEDURE — 85025 COMPLETE CBC W/AUTO DIFF WBC: CPT | Performed by: INTERNAL MEDICINE

## 2021-08-16 PROCEDURE — G0299 HHS/HOSPICE OF RN EA 15 MIN: HCPCS

## 2021-08-17 ENCOUNTER — OFFICE VISIT (OUTPATIENT)
Dept: INFECTIOUS DISEASES | Facility: CLINIC | Age: 66
End: 2021-08-17

## 2021-08-17 VITALS
BODY MASS INDEX: 20.4 KG/M2 | TEMPERATURE: 97.9 F | HEART RATE: 69 BPM | HEIGHT: 67 IN | OXYGEN SATURATION: 98 % | SYSTOLIC BLOOD PRESSURE: 145 MMHG | DIASTOLIC BLOOD PRESSURE: 73 MMHG | WEIGHT: 130 LBS

## 2021-08-17 DIAGNOSIS — I05.9 ENDOCARDITIS OF MITRAL VALVE: ICD-10-CM

## 2021-08-17 DIAGNOSIS — Z79.2 LONG TERM (CURRENT) USE OF ANTIBIOTICS: ICD-10-CM

## 2021-08-17 DIAGNOSIS — B95.5 STREPTOCOCCAL BACTEREMIA: Primary | ICD-10-CM

## 2021-08-17 DIAGNOSIS — R78.81 STREPTOCOCCAL BACTEREMIA: Primary | ICD-10-CM

## 2021-08-17 DIAGNOSIS — Z88.0 PENICILLIN ALLERGY: ICD-10-CM

## 2021-08-17 PROCEDURE — 99214 OFFICE O/P EST MOD 30 MIN: CPT | Performed by: INTERNAL MEDICINE

## 2021-08-17 RX ORDER — CEFADROXIL 500 MG/1
500 CAPSULE ORAL 2 TIMES DAILY
Qty: 180 CAPSULE | Refills: 0 | Status: SHIPPED | OUTPATIENT
Start: 2021-08-17 | End: 2021-11-15

## 2021-08-17 NOTE — PROGRESS NOTES
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     HPI: Paz Browne is a 66 y.o. 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 On 7/6/21, she underwent CHIO which showed findings consistent w/ prosthetic valve endocarditis. She was seen by cardiac surgery who recommends medical management. I recommended a 6 week course of penicillin G. She tolerated this well for about half of the course then developed itching and a rash so I changed her to ceftriaxone 2 g IV q24h which she has tolerated well. She denies fevers, chills, and sweats. No issues w/ PICC line.     She is fully vaccinated against COVID-19.      Review of Systems: no n/v/d      Past Medical History:   Diagnosis Date   • VAN (acute kidney injury) (CMS/HCC)    • Anemia    • Asthma    • Atrial flutter (CMS/HCC)     cardioversion   • Cataract    • Celiac artery stenosis (CMS/HCC)    • Chronic respiratory failure with hypoxia (CMS/HCC)    • Colon polyp    • COPD (chronic obstructive pulmonary disease) (CMS/HCC)    • Emphysema of lung (CMS/HCC)    • GI bleed    • Hiatal hernia    • History of CHF (congestive heart failure)     due to MR   • History of home oxygen therapy     3 lpm NC   • History of mitral valve replacement with tissue graft    • Hyperlipidemia    • Hypertension    • Infectious viral hepatitis     B   • Intertrigo    • Long term (current) use of anticoagulants    • Mitral regurgitation     s/p tissue MVR   • PAF (paroxysmal atrial fibrillation) (CMS/[facility])     s/p MAZE   • Pneumonia    • Pulmonary hypertension (CMS/HCC)    • S/P TVR (tricuspid valve repair) 7/7/2016     Past Surgical History:   Procedure Laterality Date   • CARDIAC CATHETERIZATION  09/01/2014    Right dominant systemt, normal coronary arteries.    • CARDIAC CATHETERIZATION Left 6/10/2016    Procedure: Cardiac catheterization;  Surgeon: Sergei  MD Rafael;  Location: [facility] CATH INVASIVE LOCATION;  Service:    • CARDIAC CATHETERIZATION N/A 6/10/2016    Procedure: Right Heart Cath;  Surgeon: Sergei Hall MD;  Location: [facility] CATH INVASIVE LOCATION;  Service:    • CATARACT EXTRACTION     • COLONOSCOPY     • COLONOSCOPY N/A 8/4/2017    Procedure: COLONOSCOPY TO CECUM/TI WITH POLYPECTOMY ( COLD BX);  Surgeon: Cleveland Devine MD;  Location: [facility] ENDOSCOPY;  Service:    • COLONOSCOPY N/A 8/10/2017    Procedure: COLONOSCOPY to cecum and TI with 2 clips placed at transverse;  Surgeon: Earnest PALOMO MD;  Location: [facility] ENDOSCOPY;  Service:    • COLONOSCOPY N/A 12/22/2017    Procedure: COLONOSCOPY INTO CECUM WITH COLD POLYPECTOMIES;  Surgeon: Cleveland Devine MD;  Location: [facility] ENDOSCOPY;  Service:    • COLONOSCOPY N/A 5/17/2021    Procedure: COLONOSCOPY to cecum;  Surgeon: Cleveland Devine MD;  Location: [facility] ENDOSCOPY;  Service: Gastroenterology;  Laterality: N/A;  Pre: Fe deficency anemia, h/x of polyps  Post: fair prep, normal   • ENDOSCOPY N/A 8/17/2017    Procedure: ESOPHAGOGASTRODUODENOSCOPY;  Surgeon: Porsha Ruby MD;  Location: [facility] ENDOSCOPY;  Service:    • ENDOSCOPY N/A 12/22/2017    Procedure: ESOPHAGOGASTRODUODENOSCOPY WITH BIOPSIES;  Surgeon: Cleveland Devine MD;  Location: [facility] ENDOSCOPY;  Service:    • ENDOSCOPY N/A 5/17/2021    Procedure: ESOPHAGOGASTRODUODENOSCOPY  with biopsies;  Surgeon: Cleveland Devine MD;  Location: [facility] ENDOSCOPY;  Service: Gastroenterology;  Laterality: N/A;  Pre: Fe deficency anemia, nausea, heme positive stool   Post: gastritis, sloughing of distal esophagus mucosa   • GALLBLADDER SURGERY     • HEMORRHOIDECTOMY     • HYSTERECTOMY     • KIDNEY SURGERY  04/22/2013    Stent placement   • MAZE PROCEDURE     • MITRAL VALVE REPLACEMENT     • TONSILLECTOMY     • TRICUSPID VALVE REPLACEMENT         Social History:    Quit smoking 2007  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 Cephalexin - itching many years ago; tolerates ceftriaxone  2. Penicillin - rash, itching      Medications:   Current Outpatient Medications:   •  albuterol (PROVENTIL) (2.5 MG/3ML) 0.083% nebulizer solution, INHALE 1 VIAL BY MOUTH EVERY 4 HOURS AS NEEDED FOR WHEEZING (Patient taking differently: Take 2.5 mg by nebulization Every 6 (Six) Hours As Needed for Wheezing (uses 2 to 3 times per day).), Disp: 150 mL, Rfl: 3  •  albuterol sulfate  (90 Base) MCG/ACT inhaler, Inhale 2 puffs Every 4 (Four) Hours As Needed for Wheezing., Disp: 18 g, Rfl: 3  •  amLODIPine (NORVASC) 10 MG tablet, TAKE 1 TABLET BY MOUTH EVERY DAY (Patient taking differently: Take 10 mg by mouth Daily.), Disp: 90 tablet, Rfl: 1  •  aspirin 81 MG EC tablet, Take 1 tablet by mouth Daily., Disp: , Rfl:   •  atorvastatin (LIPITOR) 40 MG tablet, TAKE 1 TABLET BY MOUTH EVERY DAY (Patient taking differently: Take 40 mg by mouth Daily.), Disp: 90 tablet, Rfl: 2  •  benzonatate (TESSALON) 100 MG capsule, TAKE 1 CAPSULE BY MOUTH 2 TIMES A DAY AS NEEDED FOR COUGH, Disp: 180 capsule, Rfl: 1  •  butalbital-acetaminophen-caffeine (FIORICET, ESGIC) -40 MG per tablet, Take 1 tablet by mouth Every 6 (Six) Hours As Needed for Headache., Disp: 4 tablet, Rfl: 0  •  carvedilol (COREG) 6.25 MG tablet, TAKE 1 TABLET BY MOUTH TWICE A DAY WITH MEALS, Disp: 180 tablet, Rfl: 2  •  cefTRIAXone (ROCEPHIN) 2 g injection, , Disp: , Rfl:   •  cyclobenzaprine (FLEXERIL) 10 MG tablet, TAKE 1 TABLET BY MOUTH AT BEDTIME (Patient taking differently: Take 10 mg by mouth every night at bedtime.), Disp: 90 tablet, Rfl: 3  •  fluticasone-salmeterol (Advair Diskus) 250-50 MCG/DOSE DISKUS, Inhale 1 puff 2 (Two) Times a Day., Disp: 3 each, Rfl: 3  •  furosemide (LASIX) 40 MG tablet, Take 1 tablet by mouth Daily. (Patient taking differently: Take 40 mg by mouth 2 (Two) Times a Day.), Disp: 30 tablet,  Rfl: 3  •  guaiFENesin (MUCINEX) 600 MG 12 hr tablet, Take 600 mg by mouth 2 (Two) Times a Day., Disp: , Rfl:   •  HYDROcodone-acetaminophen (NORCO) 5-325 MG per tablet, Take 1 tablet by mouth Every 8 (Eight) Hours As Needed for Moderate Pain . Chronic pain medicine for low back pain, Disp: 90 tablet, Rfl: 0  •  lisinopril (PRINIVIL,ZESTRIL) 40 MG tablet, Take 40 mg by mouth Daily., Disp: , Rfl:   •  loratadine (Claritin) 10 MG tablet, Take 10 mg by mouth 2 (two) times a day., Disp: , Rfl:   •  magnesium oxide (MAG-OX) 400 MG tablet, TAKE 1 TABLET BY MOUTH EVERY DAY, Disp: 90 tablet, Rfl: 1  •  meclizine (ANTIVERT) 25 MG tablet, Take 1 tablet by mouth 3 (Three) Times a Day As Needed for Dizziness. prn, Disp: 30 tablet, Rfl: 3  •  Multiple Vitamins-Minerals (MULTIVITAL) tablet, Take 1 tablet by mouth Daily., Disp: , Rfl:   •  Nebulizer device, 1 Units 4 (Four) Times a Day As Needed (Wheezing). J44.1 j20.8, Disp: 1 each, Rfl: 0  •  O2 (OXYGEN), Inhale 3 L/min Continuous., Disp: , Rfl:   •  Omega-3 Fatty Acids (fish oil) 1000 MG capsule capsule, Take  by mouth Every Night., Disp: , Rfl:   •  omeprazole (priLOSEC) 40 MG capsule, TAKE 1 CAPSULE BY MOUTH EVERY DAY (Patient taking differently: Take 40 mg by mouth Daily.), Disp: 90 capsule, Rfl: 1  •  ondansetron (ZOFRAN) 4 MG tablet, Take 1 tablet by mouth Every 12 (Twelve) Hours As Needed for Nausea or Vomiting., Disp: 90 tablet, Rfl: 3  •  polyethylene glycol (MIRALAX) packet, Take 17 g by mouth 2 (Two) Times a Day. (Patient taking differently: Take 17 g by mouth 2 (Two) Times a Day As Needed.), Disp: , Rfl:   •  potassium chloride 10 MEQ CR tablet, Take 1 tablet by mouth 2 (Two) Times a Day. 1 p.o. daily, Disp: 180 tablet, Rfl: 1  •  roflumilast (DALIRESP) 500 MCG tablet tablet, Take 1 tablet by mouth Daily., Disp: 90 tablet, Rfl: 0  •  rOPINIRole (REQUIP) 2 MG tablet, TAKE 1 TABLET BY MOUTH EVERY DAY EVERY NIGHT (Patient taking differently: Take 2 mg by mouth Every  "Night.), Disp: 90 tablet, Rfl: 2  •  sertraline (ZOLOFT) 100 MG tablet, TAKE 1 TABLET BY MOUTH EVERY DAY (Patient taking differently: Take 100 mg by mouth Daily.), Disp: 90 tablet, Rfl: 1  •  Sodium Chloride Flush (sodium chloride 0.9 % flush) 0.9 % solution, , Disp: , Rfl:   •  Symjepi 0.3 MG/0.3ML solution prefilled syringe, , Disp: , Rfl:   •  tiotropium (Spiriva HandiHaler) 18 MCG per inhalation capsule, Place 1 capsule into inhaler and inhale Daily., Disp: 90 capsule, Rfl: 3  •  zaleplon (SONATA) 10 MG capsule, TAKE 1 CAPSULE BY MOUTH EVERY NIGHT (Patient taking differently: Take 10 mg by mouth Every Night.), Disp: 30 capsule, Rfl: 3      OBJECTIVE:  /73   Pulse 69   Temp 97.9 °F (36.6 °C)   Ht 170.2 cm (67.01\")   Wt 59 kg (130 lb)   SpO2 98%   BMI 20.36 kg/m²     Physical Exam:  General: awake, alert, very nice  Cardiovascular: NR  Respiratory: on baseline 2L NC; no wheezing  GI: Soft, non-tender  Skin: No rashes   Vasc: PICC w/o erythema    DIAGNOSTICS:  CBC, Crt reviewed today  Lab Results   Component Value Date    WBC 7.19 08/16/2021    HGB 10.9 (L) 08/16/2021    HCT 34.8 08/16/2021     08/16/2021     Lab Results   Component Value Date    GLUCOSE 58 (L) 07/26/2021    CALCIUM 9.7 07/26/2021     (H) 07/26/2021    K 3.4 (L) 07/26/2021    CO2 30.5 (H) 07/26/2021    CL 99 07/26/2021    BUN 9 07/26/2021    CREATININE 0.91 08/16/2021    EGFRIFAFRI 86 07/12/2021    EGFRIFNONA 61 08/16/2021    BCR 10.6 07/26/2021    ANIONGAP 16.5 (H) 07/26/2021       Microbiology:  6/14 RPP: + coronavirus NL63  6/14 BCx: Streptococcus gallolyticus   6/16 BCx: negative  7/1 BCx: Streptococcus gallolyticus (sensitive to penicillin, ceftriaxone, vanco)  7/2 BCx Streptococcus gallolyticus   7/2 COVID negative  7/3 BCx: negative  7/5 COVID: negative  7/5 BCx: negative    Prior Radiology:  CHIO with evidence of vegetation on prosthetic MV    ASSESSMENT/PLAN:  1. Prosthetic mitral valve endocarditis due to Strep " gallolyticus septicemia  2. Chronic hypoxic respiratory failure (3L NC at home)  3. Mitral valve replaced (8/2014)  4. Tricuspid valve repair (8/2014)  5. Long term use of antibiotics - new problem  6. Penicillin allergy - new, tolerates cephalosporins  7. COPD    I recommend that she discontinue her PICC line and IV ceftriaxone today. With prosthetic material present, I am going to transition her to a 3 month course of suppressive/prophylactic cefadroxil 500 mg PO q12h. We discussed signs/symptoms of recurrent infection, and she will call me if they occur. RTC 3 months or sooner if needed. She has follow-up scheduled with cardiology.

## 2021-08-20 VITALS
BODY MASS INDEX: 20.36 KG/M2 | RESPIRATION RATE: 18 BRPM | WEIGHT: 130 LBS | DIASTOLIC BLOOD PRESSURE: 64 MMHG | HEART RATE: 65 BPM | SYSTOLIC BLOOD PRESSURE: 122 MMHG | TEMPERATURE: 97.4 F | OXYGEN SATURATION: 98 %

## 2021-08-23 ENCOUNTER — HOME CARE VISIT (OUTPATIENT)
Dept: HOME HEALTH SERVICES | Facility: HOME HEALTHCARE | Age: 66
End: 2021-08-23

## 2021-08-23 PROCEDURE — G0299 HHS/HOSPICE OF RN EA 15 MIN: HCPCS

## 2021-08-25 ENCOUNTER — HOME CARE VISIT (OUTPATIENT)
Dept: HOME HEALTH SERVICES | Facility: HOME HEALTHCARE | Age: 66
End: 2021-08-25

## 2021-08-25 VITALS
RESPIRATION RATE: 18 BRPM | OXYGEN SATURATION: 98 % | TEMPERATURE: 98.2 F | HEART RATE: 68 BPM | DIASTOLIC BLOOD PRESSURE: 68 MMHG | SYSTOLIC BLOOD PRESSURE: 122 MMHG

## 2021-09-14 DIAGNOSIS — Z79.891 LONG TERM CURRENT USE OF OPIATE ANALGESIC: ICD-10-CM

## 2021-09-14 RX ORDER — HYDROCODONE BITARTRATE AND ACETAMINOPHEN 5; 325 MG/1; MG/1
1 TABLET ORAL EVERY 8 HOURS PRN
Qty: 90 TABLET | Refills: 0 | Status: SHIPPED | OUTPATIENT
Start: 2021-09-14 | End: 2021-10-11 | Stop reason: SDUPTHER

## 2021-09-14 NOTE — TELEPHONE ENCOUNTER
Caller: Paz Browne    Relationship: Self    Best call back number: [phone]     Medication needed:   Requested Prescriptions     Pending Prescriptions Disp Refills   • HYDROcodone-acetaminophen (NORCO) 5-325 MG per tablet 90 tablet 0     Sig: Take 1 tablet by mouth Every 8 (Eight) Hours As Needed for Moderate Pain . Chronic pain medicine for low back pain       When do you need the refill by: 09/14/21    Does the patient have less than a 3 day supply:  [x] Yes  [] No    What is the patient's preferred pharmacy: [facility]/PHARMACY #[id number] - [place], [place] - [address] [place] [address] - [phone] [facility] [phone] FX

## 2021-09-23 RX ORDER — OMEPRAZOLE 40 MG/1
CAPSULE, DELAYED RELEASE ORAL
Qty: 90 CAPSULE | Refills: 1 | Status: SHIPPED | OUTPATIENT
Start: 2021-09-23 | End: 2022-01-25 | Stop reason: SDUPTHER

## 2021-10-11 ENCOUNTER — TELEPHONE (OUTPATIENT)
Dept: FAMILY MEDICINE CLINIC | Facility: CLINIC | Age: 66
End: 2021-10-11

## 2021-10-11 RX ORDER — FUROSEMIDE 40 MG/1
40 TABLET ORAL 2 TIMES DAILY
Qty: 60 TABLET | Refills: 0 | Status: SHIPPED | OUTPATIENT
Start: 2021-10-11 | End: 2022-07-15

## 2021-10-11 NOTE — TELEPHONE ENCOUNTER
Request for 90 day furosemide 40 1 bid informed NO patient not been here for a year needs appt. Gave 60 only

## 2021-10-27 ENCOUNTER — HOSPITAL ENCOUNTER (OUTPATIENT)
Dept: CT IMAGING | Facility: HOSPITAL | Age: 66
Discharge: HOME | End: 2021-10-27
Admitting: INTERNAL MEDICINE

## 2021-10-27 DIAGNOSIS — R91.1 LUNG NODULE: ICD-10-CM

## 2021-10-27 PROCEDURE — 71250 CT THORAX DX C-: CPT

## 2021-11-08 RX ORDER — MAGNESIUM OXIDE 400 MG/1
TABLET ORAL
Qty: 90 TABLET | Refills: 1 | Status: SHIPPED | OUTPATIENT
Start: 2021-11-08 | End: 2022-01-31

## 2021-11-08 RX ORDER — ALBUTEROL SULFATE 2.5 MG/3ML
SOLUTION RESPIRATORY (INHALATION)
Qty: 150 ML | Refills: 3 | Status: SHIPPED | OUTPATIENT
Start: 2021-11-08 | End: 2022-03-21

## 2021-11-09 ENCOUNTER — TELEPHONE (OUTPATIENT)
Dept: FAMILY MEDICINE CLINIC | Facility: CLINIC | Age: 66
End: 2021-11-09

## 2021-11-09 RX ORDER — SERTRALINE HYDROCHLORIDE 100 MG/1
100 TABLET, FILM COATED ORAL DAILY
Qty: 30 TABLET | Refills: 0 | Status: SHIPPED | OUTPATIENT
Start: 2021-11-09 | End: 2021-11-25

## 2021-11-15 DIAGNOSIS — Z79.891 LONG TERM CURRENT USE OF OPIATE ANALGESIC: ICD-10-CM

## 2021-11-15 RX ORDER — HYDROCODONE BITARTRATE AND ACETAMINOPHEN 5; 325 MG/1; MG/1
1 TABLET ORAL EVERY 8 HOURS PRN
Qty: 90 TABLET | Refills: 0 | Status: SHIPPED | OUTPATIENT
Start: 2021-11-15 | End: 2021-12-14 | Stop reason: SDUPTHER

## 2021-11-15 NOTE — TELEPHONE ENCOUNTER
PATIENT NEEDS REFILL ON:HYDROcodone-acetaminophen (NORCO) 5-325 MG per tablet     PATIENT CAN BE REACHED ON: [phone]     [id number] [place] [place] - [address] [place] [address] - [phone]  - [phone]   [phone]

## 2021-11-16 ENCOUNTER — TELEPHONE (OUTPATIENT)
Dept: FAMILY MEDICINE CLINIC | Facility: CLINIC | Age: 66
End: 2021-11-16

## 2021-11-18 RX ORDER — ZALEPLON 10 MG/1
10 CAPSULE ORAL NIGHTLY
Qty: 30 CAPSULE | Refills: 3 | Status: SHIPPED | OUTPATIENT
Start: 2021-11-18 | End: 2022-03-25

## 2021-11-19 ENCOUNTER — IMMUNIZATION (OUTPATIENT)
Dept: VACCINE CLINIC | Facility: HOSPITAL | Age: 66
End: 2021-11-19

## 2021-11-19 ENCOUNTER — OFFICE VISIT (OUTPATIENT)
Dept: INFECTIOUS DISEASES | Facility: CLINIC | Age: 66
End: 2021-11-19

## 2021-11-19 VITALS
HEIGHT: 67 IN | SYSTOLIC BLOOD PRESSURE: 148 MMHG | WEIGHT: 130 LBS | RESPIRATION RATE: 14 BRPM | DIASTOLIC BLOOD PRESSURE: 70 MMHG | TEMPERATURE: 97.3 F | BODY MASS INDEX: 20.4 KG/M2 | HEART RATE: 71 BPM

## 2021-11-19 DIAGNOSIS — R78.81 STREPTOCOCCAL BACTEREMIA: Primary | ICD-10-CM

## 2021-11-19 DIAGNOSIS — I05.9 ENDOCARDITIS OF MITRAL VALVE: ICD-10-CM

## 2021-11-19 DIAGNOSIS — B95.5 STREPTOCOCCAL BACTEREMIA: Primary | ICD-10-CM

## 2021-11-19 DIAGNOSIS — Z79.2 LONG TERM (CURRENT) USE OF ANTIBIOTICS: ICD-10-CM

## 2021-11-19 PROCEDURE — 0004A ADM SARSCOV2 30MCG/0.3ML BOOSTER: CPT | Performed by: INTERNAL MEDICINE

## 2021-11-19 PROCEDURE — 91300 HC SARSCOV02 VAC 30MCG/0.3ML IM: CPT | Performed by: INTERNAL MEDICINE

## 2021-11-19 PROCEDURE — 99213 OFFICE O/P EST LOW 20 MIN: CPT | Performed by: INTERNAL MEDICINE

## 2021-11-19 NOTE — PROGRESS NOTES
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     HPI: Paz Browne is a 66 y.o. 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 In August 2021, she completed a 6 week course of antibiotics (initially penicillin then ceftriaxone). I then transitioned her to a 3-month course of suppressive cefadroxil 500 mg PO BID which she has tolerated well. She finished this medication about 4 days ago. She denies fevers, chills, and sweats. She wears 2L NC O2 which is her baseline. She does have SOA with exertion but has known COPD and heart disease.     She is fully vaccinated against COVID-19 and received her booster earlier today.       Review of Systems: no n/v/d    Past Medical History:   Diagnosis Date   • VAN (acute kidney injury) ([facility])    • Anemia    • Asthma    • Atrial flutter ([facility])     cardioversion   • Cataract    • Celiac artery stenosis ([facility])    • Chronic respiratory failure with hypoxia ([facility])    • Colon polyp    • COPD (chronic obstructive pulmonary disease) ([facility])    • Emphysema of lung ([facility])    • GI bleed    • Hiatal hernia    • History of CHF (congestive heart failure)     due to MR   • History of home oxygen therapy     3 lpm NC   • History of mitral valve replacement with tissue graft    • Hyperlipidemia    • Hypertension    • Infectious viral hepatitis     B   • Intertrigo    • Long term (current) use of anticoagulants    • Mitral regurgitation     s/p tissue MVR   • PAF (paroxysmal atrial fibrillation) ([facility])     s/p MAZE   • Pneumonia    • Pulmonary hypertension ([facility])    • S/P TVR (tricuspid valve repair) 7/7/2016     Past Surgical History:   Procedure Laterality Date   • CARDIAC CATHETERIZATION  09/01/2014    Right dominant systemt, normal coronary arteries.    • CARDIAC CATHETERIZATION Left 6/10/2016    Procedure: Cardiac catheterization;  Surgeon: Sergei Hall MD;   Location: [facility] CATH INVASIVE LOCATION;  Service:    • CARDIAC CATHETERIZATION N/A 6/10/2016    Procedure: Right Heart Cath;  Surgeon: Sergei Hall MD;  Location: [facility] CATH INVASIVE LOCATION;  Service:    • CATARACT EXTRACTION     • COLONOSCOPY     • COLONOSCOPY N/A 8/4/2017    Procedure: COLONOSCOPY TO CECUM/TI WITH POLYPECTOMY ( COLD BX);  Surgeon: Cleveland Devine MD;  Location: [facility] ENDOSCOPY;  Service:    • COLONOSCOPY N/A 8/10/2017    Procedure: COLONOSCOPY to cecum and TI with 2 clips placed at transverse;  Surgeon: Earnest PALOMO MD;  Location[facility] ENDOSCOPY;  Service:    • COLONOSCOPY N/A 12/22/2017    Procedure: COLONOSCOPY INTO CECUM WITH COLD POLYPECTOMIES;  Surgeon: Cleveland Devine MD;  Location: [facility] ENDOSCOPY;  Service:    • COLONOSCOPY N/A 5/17/2021    Procedure: COLONOSCOPY to cecum;  Surgeon: Cleveland Devine MD;  Location: [facility] ENDOSCOPY;  Service: Gastroenterology;  Laterality: N/A;  Pre: Fe deficency anemia, h/x of polyps  Post: fair prep, normal   • ENDOSCOPY N/A 8/17/2017    Procedure: ESOPHAGOGASTRODUODENOSCOPY;  Surgeon: Porsha Ruby MD;  Location: [facility] ENDOSCOPY;  Service:    • ENDOSCOPY N/A 12/22/2017    Procedure: ESOPHAGOGASTRODUODENOSCOPY WITH BIOPSIES;  Surgeon: Cleveland Devine MD;  Location: [facility] ENDOSCOPY;  Service:    • ENDOSCOPY N/A 5/17/2021    Procedure: ESOPHAGOGASTRODUODENOSCOPY  with biopsies;  Surgeon: Cleveland Devine MD;  Location: [facility] ENDOSCOPY;  Service: Gastroenterology;  Laterality: N/A;  Pre: Fe deficency anemia, nausea, heme positive stool   Post: gastritis, sloughing of distal esophagus mucosa   • GALLBLADDER SURGERY     • HEMORRHOIDECTOMY     • HYSTERECTOMY     • KIDNEY SURGERY  04/22/2013    Stent placement   • MAZE PROCEDURE     • MITRAL VALVE REPLACEMENT     • TONSILLECTOMY     • TRICUSPID VALVE REPLACEMENT         Social History:    Quit smoking 2007  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 Cephalexin - itching many years ago; tolerates ceftriaxone and cefadroxil  2. Penicillin - rash, itching      Medications:   Current Outpatient Medications:   •  albuterol (PROVENTIL) (2.5 MG/3ML) 0.083% nebulizer solution, USE 1 VIAL VIA NEBULIZER EVERY FOUR HOURS AS NEEDED FOR WHEEZING, Disp: 150 mL, Rfl: 3  •  albuterol sulfate  (90 Base) MCG/ACT inhaler, Inhale 2 puffs Every 4 (Four) Hours As Needed for Wheezing., Disp: 18 g, Rfl: 3  •  amLODIPine (NORVASC) 10 MG tablet, TAKE 1 TABLET BY MOUTH EVERY DAY (Patient taking differently: Take 10 mg by mouth Daily.), Disp: 90 tablet, Rfl: 1  •  aspirin 81 MG EC tablet, Take 1 tablet by mouth Daily., Disp: , Rfl:   •  atorvastatin (LIPITOR) 40 MG tablet, TAKE 1 TABLET BY MOUTH EVERY DAY (Patient taking differently: Take 40 mg by mouth Daily.), Disp: 90 tablet, Rfl: 2  •  benzonatate (TESSALON) 100 MG capsule, TAKE 1 CAPSULE BY MOUTH 2 TIMES A DAY AS NEEDED FOR COUGH, Disp: 180 capsule, Rfl: 1  •  butalbital-acetaminophen-caffeine (FIORICET, ESGIC) -40 MG per tablet, Take 1 tablet by mouth Every 6 (Six) Hours As Needed for Headache., Disp: 4 tablet, Rfl: 0  •  carvedilol (COREG) 6.25 MG tablet, TAKE 1 TABLET BY MOUTH TWICE A DAY WITH MEALS, Disp: 180 tablet, Rfl: 2  •  cyclobenzaprine (FLEXERIL) 10 MG tablet, TAKE 1 TABLET BY MOUTH AT BEDTIME (Patient taking differently: Take 10 mg by mouth every night at bedtime.), Disp: 90 tablet, Rfl: 3  •  fluticasone-salmeterol (Advair Diskus) 250-50 MCG/DOSE DISKUS, Inhale 1 puff 2 (Two) Times a Day., Disp: 3 each, Rfl: 3  •  furosemide (LASIX) 40 MG tablet, Take 1 tablet by mouth 2 (Two) Times a Day., Disp: 60 tablet, Rfl: 0  •  guaiFENesin (MUCINEX) 600 MG 12 hr tablet, Take 600 mg by mouth 2 (Two) Times a Day., Disp: , Rfl:   •  HYDROcodone-acetaminophen (NORCO) 5-325 MG per tablet, Take 1 tablet by mouth Every 8 (Eight) Hours As Needed for Moderate  Pain . Chronic pain medicine for low back pain, Disp: 90 tablet, Rfl: 0  •  lisinopril (PRINIVIL,ZESTRIL) 40 MG tablet, Take 40 mg by mouth Daily., Disp: , Rfl:   •  loratadine (Claritin) 10 MG tablet, Take 10 mg by mouth 2 (two) times a day., Disp: , Rfl:   •  magnesium oxide (MAG-OX) 400 MG tablet, TAKE 1 TABLET BY MOUTH EVERY DAY, Disp: 90 tablet, Rfl: 1  •  meclizine (ANTIVERT) 25 MG tablet, Take 1 tablet by mouth 3 (Three) Times a Day As Needed for Dizziness. prn, Disp: 30 tablet, Rfl: 3  •  Multiple Vitamins-Minerals (MULTIVITAL) tablet, Take 1 tablet by mouth Daily., Disp: , Rfl:   •  Nebulizer device, 1 Units 4 (Four) Times a Day As Needed (Wheezing). J44.1 j20.8, Disp: 1 each, Rfl: 0  •  O2 (OXYGEN), Inhale 3 L/min Continuous., Disp: , Rfl:   •  Omega-3 Fatty Acids (fish oil) 1000 MG capsule capsule, Take  by mouth Every Night., Disp: , Rfl:   •  omeprazole (priLOSEC) 40 MG capsule, TAKE 1 CAPSULE BY MOUTH EVERY DAY, Disp: 90 capsule, Rfl: 1  •  ondansetron (ZOFRAN) 4 MG tablet, Take 1 tablet by mouth Every 12 (Twelve) Hours As Needed for Nausea or Vomiting., Disp: 90 tablet, Rfl: 3  •  polyethylene glycol (MIRALAX) packet, Take 17 g by mouth 2 (Two) Times a Day. (Patient taking differently: Take 17 g by mouth 2 (Two) Times a Day As Needed.), Disp: , Rfl:   •  potassium chloride 10 MEQ CR tablet, Take 1 tablet by mouth 2 (Two) Times a Day. 1 p.o. daily, Disp: 180 tablet, Rfl: 1  •  roflumilast (DALIRESP) 500 MCG tablet tablet, Take 1 tablet by mouth Daily., Disp: 90 tablet, Rfl: 0  •  rOPINIRole (REQUIP) 2 MG tablet, TAKE 1 TABLET BY MOUTH EVERY DAY EVERY NIGHT (Patient taking differently: Take 2 mg by mouth Every Night.), Disp: 90 tablet, Rfl: 2  •  sertraline (ZOLOFT) 100 MG tablet, Take 1 tablet by mouth Daily. MUST BE SEEN, Disp: 30 tablet, Rfl: 0  •  Sodium Chloride Flush (sodium chloride 0.9 % flush) 0.9 % solution, , Disp: , Rfl:   •  Symjepi 0.3 MG/0.3ML solution prefilled syringe, , Disp: , Rfl:  "  •  tiotropium (Spiriva HandiHaler) 18 MCG per inhalation capsule, Place 1 capsule into inhaler and inhale Daily., Disp: 90 capsule, Rfl: 3  •  zaleplon (SONATA) 10 MG capsule, Take 1 capsule by mouth Every Night., Disp: 30 capsule, Rfl: 3      OBJECTIVE:  /70   Pulse 71   Temp 97.3 °F (36.3 °C)   Resp 14   Ht 170.2 cm (67\")   Wt 59 kg (130 lb)   BMI 20.36 kg/m²     Physical Exam:  General: awake, alert, very nice  ENT: wearing mask  Neck:  supple  Cardiovascular: NR, no murmur  Respiratory: on baseline 2L NC; no wheezing  GI: Soft, non-tender  : + Vargas  Skin: No rashes     DIAGNOSTICS:  CBC reviewed today  Lab Results   Component Value Date    WBC 7.19 08/16/2021    HGB 10.9 (L) 08/16/2021    HCT 34.8 08/16/2021     08/16/2021     Lab Results   Component Value Date    GLUCOSE 58 (L) 07/26/2021    CALCIUM 9.7 07/26/2021     (H) 07/26/2021    K 3.4 (L) 07/26/2021    CO2 30.5 (H) 07/26/2021    CL 99 07/26/2021    BUN 9 07/26/2021    CREATININE 0.91 08/16/2021    EGFRIFAFRI 86 07/12/2021    EGFRIFNONA 61 08/16/2021    BCR 10.6 07/26/2021    ANIONGAP 16.5 (H) 07/26/2021       Microbiology:  6/14 RPP: + coronavirus NL63  6/14 BCx: Streptococcus gallolyticus   6/16 BCx: negative  7/1 BCx: Streptococcus gallolyticus (sensitive to penicillin, ceftriaxone, vanco)  7/2 BCx Streptococcus gallolyticus   7/2 COVID negative  7/3 BCx: negative  7/5 COVID: negative  7/5 BCx: negative    Prior Radiology:  CHIO with evidence of vegetation on prosthetic MV    ASSESSMENT/PLAN:  1. 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 Chronic hypoxic respiratory failure (3L NC at home)  3. Mitral valve replaced (8/2014)  4. Tricuspid valve repair (8/2014)  5. Penicillin allergy - new, tolerates cephalosporins  6. COPD  7. Long term use of antibiotics    At this point she has completed 6 weeks of IV antibiotics followed by 3 months of suppressive antibiotics. I do not see a firm " indication for ongoing antibiotic suppression. I told her that if she develops fevers, rigors, sweats without another cause then we would need to check blood cultures to make sure there is not recurrent septicemia. She expressed understanding. She may follow-up w/ me as needed.

## 2021-11-25 RX ORDER — AMLODIPINE BESYLATE 10 MG/1
TABLET ORAL
Qty: 90 TABLET | Refills: 1 | Status: SHIPPED | OUTPATIENT
Start: 2021-11-25 | End: 2022-06-02

## 2021-11-25 RX ORDER — SERTRALINE HYDROCHLORIDE 100 MG/1
TABLET, FILM COATED ORAL
Qty: 90 TABLET | Refills: 1 | Status: SHIPPED | OUTPATIENT
Start: 2021-11-25 | End: 2022-02-28

## 2021-11-25 RX ORDER — ROPINIROLE 2 MG/1
TABLET, FILM COATED ORAL
Qty: 90 TABLET | Refills: 2 | Status: SHIPPED | OUTPATIENT
Start: 2021-11-25 | End: 2022-06-02

## 2021-11-29 RX ORDER — POTASSIUM CHLORIDE 750 MG/1
TABLET, FILM COATED, EXTENDED RELEASE ORAL
Qty: 180 TABLET | Refills: 1 | Status: SHIPPED | OUTPATIENT
Start: 2021-11-29 | End: 2021-12-15

## 2021-12-14 DIAGNOSIS — Z79.891 LONG TERM CURRENT USE OF OPIATE ANALGESIC: ICD-10-CM

## 2021-12-14 RX ORDER — HYDROCODONE BITARTRATE AND ACETAMINOPHEN 5; 325 MG/1; MG/1
1 TABLET ORAL EVERY 8 HOURS PRN
Qty: 90 TABLET | Refills: 0 | Status: SHIPPED | OUTPATIENT
Start: 2021-12-14 | End: 2022-01-11 | Stop reason: SDUPTHER

## 2021-12-14 NOTE — TELEPHONE ENCOUNTER
Caller: Paz Browne    Relationship: Self    Best call back number: [phone]     Requested Prescriptions:   Requested Prescriptions     Pending Prescriptions Disp Refills   • HYDROcodone-acetaminophen (NORCO) 5-325 MG per tablet 90 tablet 0     Sig: Take 1 tablet by mouth Every 8 (Eight) Hours As Needed for Moderate Pain . Chronic pain medicine for low back pain        Pharmacy where request should be sent: [facility]/PHARMACY #[id number] - [address] AT [facility] - [phone] [facility] [phone] FX     Additional details provided by patient: PATIENT WILL RUN OUT THIS WEEKEND     Does the patient have less than a 3 day supply:  [] Yes  [x] No    Harrison OLIVIA Rep   12/14/21 10:34 EST

## 2021-12-15 RX ORDER — POTASSIUM CHLORIDE 750 MG/1
TABLET, FILM COATED, EXTENDED RELEASE ORAL
Qty: 90 TABLET | Refills: 1 | Status: SHIPPED | OUTPATIENT
Start: 2021-12-15 | End: 2022-05-20

## 2021-12-23 RX ORDER — ATORVASTATIN CALCIUM 40 MG/1
TABLET, FILM COATED ORAL
Qty: 90 TABLET | Refills: 2 | Status: SHIPPED | OUTPATIENT
Start: 2021-12-23 | End: 2022-11-21

## 2021-12-29 ENCOUNTER — TELEPHONE (OUTPATIENT)
Dept: FAMILY MEDICINE CLINIC | Facility: CLINIC | Age: 66
End: 2021-12-29

## 2021-12-29 RX ORDER — AZITHROMYCIN 250 MG/1
TABLET, FILM COATED ORAL
Qty: 6 TABLET | Refills: 0 | Status: SHIPPED | OUTPATIENT
Start: 2021-12-29 | End: 2022-02-05

## 2021-12-29 NOTE — TELEPHONE ENCOUNTER
PATIENT HAS HEAD CONGESTION/COUGH/SORE THROAT. DOES NOT WANT TO LEAVE THE HOUSE. SHE WANTED TO KNOW IF YOU WOULD SEND IN AN ANTIBIOTIC FOR HER.    PLEASE ADVISE  [phone]

## 2022-01-06 RX ORDER — CYCLOBENZAPRINE HCL 10 MG
10 TABLET ORAL
Qty: 90 TABLET | Refills: 3 | OUTPATIENT
Start: 2022-01-06

## 2022-01-11 ENCOUNTER — TELEPHONE (OUTPATIENT)
Dept: FAMILY MEDICINE CLINIC | Facility: CLINIC | Age: 67
End: 2022-01-11

## 2022-01-11 DIAGNOSIS — Z79.891 LONG TERM CURRENT USE OF OPIATE ANALGESIC: ICD-10-CM

## 2022-01-11 RX ORDER — HYDROCODONE BITARTRATE AND ACETAMINOPHEN 5; 325 MG/1; MG/1
1 TABLET ORAL EVERY 8 HOURS PRN
Qty: 90 TABLET | Refills: 0 | Status: CANCELLED | OUTPATIENT
Start: 2022-01-11

## 2022-01-11 RX ORDER — HYDROCODONE BITARTRATE AND ACETAMINOPHEN 5; 325 MG/1; MG/1
1 TABLET ORAL EVERY 8 HOURS PRN
Qty: 45 TABLET | Refills: 0 | Status: SHIPPED | OUTPATIENT
Start: 2022-01-11 | End: 2022-01-20 | Stop reason: SDUPTHER

## 2022-01-11 NOTE — TELEPHONE ENCOUNTER
Patient not seen since 11/20 you have been deceasing meds due to needing appointment . She has been in and out hospital and seen all kinds of specalist  No way of verifying correct meds

## 2022-01-11 NOTE — TELEPHONE ENCOUNTER
Caller: Paz Browne    Relationship to patient: Self    Best call back number: [phone]    Patient is needing: PATIENT STATES SHE NEEDS A PAPER COPY OF THE PRESCRIPTION FOR tiotropium (Spiriva HandiHaler) 18 MCG per inhalation capsuletiotropium AND fluticasone-salmeterol (Advair Diskus) 250-50 MCG/DOSE DISKUS.     PLEASE LET PATIENT KNOW WHEN SHE CAN COME PICK THIS UP.

## 2022-01-11 NOTE — TELEPHONE ENCOUNTER
Caller: Paz Browne    Relationship: Self    Best call back number: [phone]    Requested Prescriptions:   Requested Prescriptions     Pending Prescriptions Disp Refills   • HYDROcodone-acetaminophen (NORCO) 5-325 MG per tablet 90 tablet 0     Sig: Take 1 tablet by mouth Every 8 (Eight) Hours As Needed for Moderate Pain . Chronic pain medicine for low back pain        Pharmacy where request should be sent: [facility]/PHARMACY #[id number] - [address] AT [facility] - [phone] [facility] [phone] FX     Additional details provided by patient: N/A    Does the patient have less than a 3 day supply:  [] Yes  [x] No    Harrison Uribe Rep   01/11/22 12:02 EST

## 2022-01-18 ENCOUNTER — TELEPHONE (OUTPATIENT)
Dept: FAMILY MEDICINE CLINIC | Facility: CLINIC | Age: 67
End: 2022-01-18

## 2022-01-18 NOTE — TELEPHONE ENCOUNTER
Caller: Paz Browne    Relationship: Self    Best call back number: [phone] (H)    What is the best time to reach you: ANY TIME     Who are you requesting to speak with (clinical staff, provider,  specific staff member): CLINICAL STAFF     What was the call regarding: PATIENT CALLED IN STATING WE JUST REFILLED HER PAIN MEDICATION FOR HER HYDROcodone-acetaminophen (NORCO) 5-325 MG per tablet    THE PHARMACY FILLED IT FOR 45 TABLETS, AND SHE IS USED TO GETTING 90 TABLETS AND IS WONDERING WHY THEY ONLY FILLED IT FOR 45.    SHE IS WONDERING HOW TO FIX THIS TO GET 90  PLEASE CALL AND ADVISE    [id number] - [place], [place] - [address] [facility] AT [facility] - [phone]  - [phone]   [phone]    Do you require a callback: YES

## 2022-01-20 ENCOUNTER — TELEMEDICINE (OUTPATIENT)
Dept: FAMILY MEDICINE CLINIC | Facility: CLINIC | Age: 67
End: 2022-01-20

## 2022-01-20 DIAGNOSIS — Z79.891 LONG TERM CURRENT USE OF OPIATE ANALGESIC: ICD-10-CM

## 2022-01-20 DIAGNOSIS — M15.9 PRIMARY OSTEOARTHRITIS INVOLVING MULTIPLE JOINTS: Primary | ICD-10-CM

## 2022-01-20 PROCEDURE — 99441 PR PHYS/QHP TELEPHONE EVALUATION 5-10 MIN: CPT | Performed by: INTERNAL MEDICINE

## 2022-01-20 RX ORDER — HYDROCODONE BITARTRATE AND ACETAMINOPHEN 5; 325 MG/1; MG/1
1 TABLET ORAL EVERY 8 HOURS PRN
Qty: 90 TABLET | Refills: 0 | Status: SHIPPED | OUTPATIENT
Start: 2022-01-20 | End: 2022-02-23 | Stop reason: SDUPTHER

## 2022-01-20 NOTE — PROGRESS NOTES
Subjective   Paz Browne is a 66 y.o. female.     There were no vitals filed for this visit.   There is no height or weight on file to calculate BMI.     History of Present Illness   You have chosen to receive care through a telephone visit. Do you consent to use a telephone visit for your medical care today? Yes  Patient was seen for a 10-minute phone encounter.  Patient does have extensive osteoarthritis especially in her lower back.  She is using hydrocodone to relieve her pain.  Patient is doing well continue present treatment.  Patient will follow up in 4 months.  Patient does not want to come in because of COVID.    Dictated utilizing Dragon dictation. If there are questions or for further clarification, please contact me.  The following portions of the patient's history were reviewed and updated as appropriate: allergies, current medications, past family history, past medical history, past social history, past surgical history and problem list.    Review of Systems   Constitutional: Negative for fatigue and fever.   HENT: Positive for congestion. Negative for trouble swallowing.    Eyes: Negative for discharge and visual disturbance.   Respiratory: Negative for choking and shortness of breath.    Cardiovascular: Negative for chest pain and palpitations.   Gastrointestinal: Negative for abdominal pain and blood in stool.   Endocrine: Negative.    Genitourinary: Negative for genital sores and hematuria.   Musculoskeletal: Positive for arthralgias, back pain and myalgias. Negative for gait problem and joint swelling.   Skin: Negative for color change, pallor, rash and wound.   Allergic/Immunologic: Positive for environmental allergies. Negative for immunocompromised state.   Neurological: Negative for facial asymmetry and speech difficulty.   Psychiatric/Behavioral: Negative for hallucinations and suicidal ideas.       Objective   Physical Exam  Pulmonary:      Effort: Pulmonary effort is normal.       Breath sounds: Normal breath sounds.   Neurological:      General: No focal deficit present.      Mental Status: She is oriented to person, place, and time. Mental status is at baseline.   Psychiatric:         Mood and Affect: Mood normal.         Behavior: Behavior normal.         Thought Content: Thought content normal.         Judgment: Judgment normal.         Assessment/Plan #1 refill medication #2 follow-up in 4 months  Problems Addressed this Visit        Musculoskeletal and Injuries    Primary osteoarthritis involving multiple joints - Primary      Other Visit Diagnoses     Long term current use of opiate analgesic         Relevant Medications    HYDROcodone-acetaminophen (NORCO) 5-325 MG per tablet      Diagnoses     Diagnosis Codes Comments    Primary osteoarthritis involving multiple joints    -  Primary ICD-10-CM: M89.49  ICD-9-CM: 715.98     Long term current use of opiate analgesic      ICD-10-CM: Z79.891  ICD-9-CM: V58.69

## 2022-01-24 RX ORDER — TIOTROPIUM BROMIDE 18 UG/1
CAPSULE ORAL; RESPIRATORY (INHALATION)
Qty: 30 CAPSULE | Refills: 0 | Status: SHIPPED | OUTPATIENT
Start: 2022-01-24 | End: 2022-10-19 | Stop reason: HOSPADM

## 2022-01-25 ENCOUNTER — OFFICE VISIT (OUTPATIENT)
Dept: ONCOLOGY | Facility: CLINIC | Age: 67
End: 2022-01-25

## 2022-01-25 ENCOUNTER — LAB (OUTPATIENT)
Dept: LAB | Facility: HOSPITAL | Age: 67
End: 2022-01-25

## 2022-01-25 VITALS
HEIGHT: 67 IN | OXYGEN SATURATION: 95 % | BODY MASS INDEX: 21.31 KG/M2 | RESPIRATION RATE: 17 BRPM | TEMPERATURE: 97 F | SYSTOLIC BLOOD PRESSURE: 155 MMHG | WEIGHT: 135.8 LBS | DIASTOLIC BLOOD PRESSURE: 75 MMHG | HEART RATE: 79 BPM

## 2022-01-25 DIAGNOSIS — D64.9 ANEMIA, UNSPECIFIED TYPE: ICD-10-CM

## 2022-01-25 DIAGNOSIS — J44.9 CHRONIC OBSTRUCTIVE PULMONARY DISEASE, UNSPECIFIED COPD TYPE: ICD-10-CM

## 2022-01-25 DIAGNOSIS — D50.0 IRON DEFICIENCY ANEMIA DUE TO CHRONIC BLOOD LOSS: ICD-10-CM

## 2022-01-25 DIAGNOSIS — D50.9 IRON DEFICIENCY ANEMIA, UNSPECIFIED IRON DEFICIENCY ANEMIA TYPE: Primary | ICD-10-CM

## 2022-01-25 LAB
BASOPHILS # BLD AUTO: 0.05 10*3/MM3 (ref 0–0.2)
BASOPHILS NFR BLD AUTO: 0.8 % (ref 0–1.5)
DEPRECATED RDW RBC AUTO: 44.2 FL (ref 37–54)
EOSINOPHIL # BLD AUTO: 0.2 10*3/MM3 (ref 0–0.4)
EOSINOPHIL NFR BLD AUTO: 3 % (ref 0.3–6.2)
ERYTHROCYTE [DISTWIDTH] IN BLOOD BY AUTOMATED COUNT: 13.5 % (ref 12.3–15.4)
FERRITIN SERPL-MCNC: 359.6 NG/ML (ref 13–150)
HCT VFR BLD AUTO: 44.1 % (ref 34–46.6)
HGB BLD-MCNC: 14 G/DL (ref 12–15.9)
IMM GRANULOCYTES # BLD AUTO: 0 10*3/MM3 (ref 0–0.05)
IMM GRANULOCYTES NFR BLD AUTO: 0 % (ref 0–0.5)
IRON 24H UR-MRATE: 41 MCG/DL (ref 37–145)
IRON SATN MFR SERPL: 11 % (ref 14–48)
LYMPHOCYTES # BLD AUTO: 1.76 10*3/MM3 (ref 0.7–3.1)
LYMPHOCYTES NFR BLD AUTO: 26.5 % (ref 19.6–45.3)
MCH RBC QN AUTO: 28.5 PG (ref 26.6–33)
MCHC RBC AUTO-ENTMCNC: 31.7 G/DL (ref 31.5–35.7)
MCV RBC AUTO: 89.8 FL (ref 79–97)
MONOCYTES # BLD AUTO: 0.35 10*3/MM3 (ref 0.1–0.9)
MONOCYTES NFR BLD AUTO: 5.3 % (ref 5–12)
NEUTROPHILS NFR BLD AUTO: 4.27 10*3/MM3 (ref 1.7–7)
NEUTROPHILS NFR BLD AUTO: 64.4 % (ref 42.7–76)
NRBC BLD AUTO-RTO: 0 /100 WBC (ref 0–0.2)
PLATELET # BLD AUTO: 199 10*3/MM3 (ref 140–450)
PMV BLD AUTO: 10.7 FL (ref 6–12)
RBC # BLD AUTO: 4.91 10*6/MM3 (ref 3.77–5.28)
TIBC SERPL-MCNC: 358 MCG/DL (ref 249–505)
TRANSFERRIN SERPL-MCNC: 256 MG/DL (ref 200–360)
WBC NRBC COR # BLD: 6.63 10*3/MM3 (ref 3.4–10.8)

## 2022-01-25 PROCEDURE — 36415 COLL VENOUS BLD VENIPUNCTURE: CPT

## 2022-01-25 PROCEDURE — 82728 ASSAY OF FERRITIN: CPT

## 2022-01-25 PROCEDURE — 83540 ASSAY OF IRON: CPT

## 2022-01-25 PROCEDURE — 85025 COMPLETE CBC W/AUTO DIFF WBC: CPT

## 2022-01-25 PROCEDURE — 99213 OFFICE O/P EST LOW 20 MIN: CPT | Performed by: INTERNAL MEDICINE

## 2022-01-25 PROCEDURE — 84466 ASSAY OF TRANSFERRIN: CPT

## 2022-01-25 RX ORDER — OMEPRAZOLE 40 MG/1
40 CAPSULE, DELAYED RELEASE ORAL DAILY
Qty: 90 CAPSULE | Refills: 1 | Status: SHIPPED | OUTPATIENT
Start: 2022-01-25 | End: 2022-06-29

## 2022-01-27 RX ORDER — CYCLOBENZAPRINE HCL 10 MG
10 TABLET ORAL
Qty: 15 TABLET | Refills: 0 | OUTPATIENT
Start: 2022-01-27

## 2022-01-31 RX ORDER — MAGNESIUM OXIDE 400 MG/1
TABLET ORAL
Qty: 90 TABLET | Refills: 1 | Status: SHIPPED | OUTPATIENT
Start: 2022-01-31 | End: 2022-08-09

## 2022-02-01 RX ORDER — CYCLOBENZAPRINE HCL 10 MG
10 TABLET ORAL
Qty: 90 TABLET | Refills: 3 | Status: SHIPPED | OUTPATIENT
Start: 2022-02-01 | End: 2023-02-20

## 2022-02-03 ENCOUNTER — OFFICE VISIT (OUTPATIENT)
Dept: CARDIOLOGY | Facility: CLINIC | Age: 67
End: 2022-02-03

## 2022-02-03 VITALS
HEART RATE: 82 BPM | SYSTOLIC BLOOD PRESSURE: 146 MMHG | OXYGEN SATURATION: 97 % | BODY MASS INDEX: 21.82 KG/M2 | RESPIRATION RATE: 16 BRPM | DIASTOLIC BLOOD PRESSURE: 76 MMHG | HEIGHT: 67 IN | WEIGHT: 139 LBS

## 2022-02-03 DIAGNOSIS — I10 PRIMARY HYPERTENSION: ICD-10-CM

## 2022-02-03 DIAGNOSIS — Z86.79 HISTORY OF ENDOCARDITIS: Primary | ICD-10-CM

## 2022-02-03 DIAGNOSIS — Z95.4 HISTORY OF MITRAL VALVE REPLACEMENT WITH TISSUE GRAFT: ICD-10-CM

## 2022-02-03 DIAGNOSIS — Z98.890 S/P TVR (TRICUSPID VALVE REPAIR): ICD-10-CM

## 2022-02-03 DIAGNOSIS — I48.0 PAF (PAROXYSMAL ATRIAL FIBRILLATION): ICD-10-CM

## 2022-02-03 PROBLEM — R78.81 BACTEREMIA: Status: RESOLVED | Noted: 2021-07-03 | Resolved: 2022-02-03

## 2022-02-03 PROBLEM — J44.1 COPD EXACERBATION (HCC): Status: RESOLVED | Noted: 2021-06-14 | Resolved: 2022-02-03

## 2022-02-03 PROBLEM — A41.9 SEPTICEMIA (HCC): Status: RESOLVED | Noted: 2021-07-02 | Resolved: 2022-02-03

## 2022-02-03 PROBLEM — B02.9 SHINGLES: Status: RESOLVED | Noted: 2020-04-08 | Resolved: 2022-02-03

## 2022-02-03 PROBLEM — R19.5 DARK STOOLS: Status: RESOLVED | Noted: 2021-03-30 | Resolved: 2022-02-03

## 2022-02-03 PROBLEM — R42 VERTIGO: Status: RESOLVED | Noted: 2020-10-06 | Resolved: 2022-02-03

## 2022-02-03 PROCEDURE — 99214 OFFICE O/P EST MOD 30 MIN: CPT | Performed by: INTERNAL MEDICINE

## 2022-02-03 PROCEDURE — 93000 ELECTROCARDIOGRAM COMPLETE: CPT | Performed by: INTERNAL MEDICINE

## 2022-02-03 NOTE — PROGRESS NOTES
Date of Office Visit: 22  Encounter Provider: Rodolfo Null MD  Place of Service: [facility] CARDIOLOGY  Patient Name: Paz Browne  :1955    Chief Complaint   Patient presents with   • Heart Problem     mitral valve replacement   :     HPI:     Ms. Browne is 66 y.o. and presents today to follow up. I have reviewed prior notes and there are no changes except for any new updates described below. I have also reviewed any information entered into the medical record by the patient or by ancillary staff.     She presented in  with congestive heart failure and rapid atrial fibrillation.  She was found to have normal left ventricular systolic function, severe pulmonary pretension, severe mitral regurgitation, and severe tricuspid regurgitation.  Coronary angiography revealed no significant coronary disease.  She underwent tissue mitral valve replacement with a  31mm porcine [place] Leopoldo bioprosthesis, tissue valve repair/annuloplasty, right and left cryomaze, and closure of the left atrial appendage.    In , she developed progressive shortness of breath.  She underwent repeat coronary angiography which again showed normal coronary arteries.  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  She was started on oxygen for her COPD at that time.    In , she presented with GI bleeding.  She had been on warfarin, but as she had maintained sinus rhythm, it was stopped.    In , she was diagnosed with recurrent strep bacteremia.  A CHIO showed a small vegetation on the bioprosthetic mitral valve, but no valvular destruction.  She was treated with IV antibiotics for 6 weeks and oral antibiotics for 3 months.    She is doing well, overall.  She is chronically short of breath and remains on oxygen.  She does not have leg swelling, orthopnea, palpitations, lightheadedness, syncope, chest pain, or fevers.    Past  Medical History:   Diagnosis Date   • VAN (acute kidney injury) (HCC)    • Anemia    • Asthma    • Atrial flutter (HCC)     cardioversion   • Cataract    • Celiac artery stenosis (HCC)    • Chronic respiratory failure with hypoxia (HCC)    • Colon polyp    • COPD (chronic obstructive pulmonary disease) (HCC)    • GI bleed    • Hiatal hernia    • History of CHF (congestive heart failure)     due to MR   • History of home oxygen therapy     3 lpm NC   • History of mitral valve replacement with tissue graft    • Hyperlipidemia    • Hypertension    • Infectious viral hepatitis     B   • Intertrigo    • Long term (current) use of anticoagulants    • Mitral regurgitation     s/p tissue MVR   • PAF (paroxysmal atrial fibrillation) (HCC)     s/p MAZE   • Pneumonia    • Pulmonary hypertension (HCC)    • S/P TVR (tricuspid valve repair) 7/7/2016       Past Surgical History:   Procedure Laterality Date   • CARDIAC CATHETERIZATION  09/01/2014    Right dominant systemt, normal coronary arteries.    • CARDIAC CATHETERIZATION Left 6/10/2016    Procedure: Cardiac catheterization;  Surgeon: Sergei Hall MD;  Location: [facility] CATH INVASIVE LOCATION;  Service:    • CARDIAC CATHETERIZATION N/A 6/10/2016    Procedure: Right Heart Cath;  Surgeon: Sergei Hall MD;  Location: [facility] CATH INVASIVE LOCATION;  Service:    • CATARACT EXTRACTION     • COLONOSCOPY     • COLONOSCOPY N/A 8/4/2017    Procedure: COLONOSCOPY TO CECUM/TI WITH POLYPECTOMY ( COLD BX);  Surgeon: Cleveland Devine MD;  Location: [facility] ENDOSCOPY;  Service:    • COLONOSCOPY N/A 8/10/2017    Procedure: COLONOSCOPY to cecum and TI with 2 clips placed at transverse;  Surgeon: Earnest PALOMO MD;  Location: [facility] ENDOSCOPY;  Service:    • COLONOSCOPY N/A 12/22/2017    Procedure: COLONOSCOPY INTO CECUM WITH COLD POLYPECTOMIES;  Surgeon: Cleveland Devine MD;  Location: [facility] ENDOSCOPY;  Service:    • COLONOSCOPY N/A 5/17/2021    Procedure: COLONOSCOPY to cecum;  Surgeon: Cleveland Devine,  MD;  Location: [facility] ENDOSCOPY;  Service: Gastroenterology;  Laterality: N/A;  Pre: Fe deficency anemia, h/x of polyps  Post: fair prep, normal   • ENDOSCOPY N/A 8/17/2017    Procedure: ESOPHAGOGASTRODUODENOSCOPY;  Surgeon: Porsha Ruby MD;  Location: [facility] ENDOSCOPY;  Service:    • ENDOSCOPY N/A 12/22/2017    Procedure: ESOPHAGOGASTRODUODENOSCOPY WITH BIOPSIES;  Surgeon: Cleveland Devine MD;  Location: [facility] ENDOSCOPY;  Service:    • ENDOSCOPY N/A 5/17/2021    Procedure: ESOPHAGOGASTRODUODENOSCOPY  with biopsies;  Surgeon: Cleveland Devine MD;  Location: [facility] ENDOSCOPY;  Service: Gastroenterology;  Laterality: N/A;  Pre: Fe deficency anemia, nausea, heme positive stool   Post: gastritis, sloughing of distal esophagus mucosa   • GALLBLADDER SURGERY     • HEMORRHOIDECTOMY     • HYSTERECTOMY     • KIDNEY SURGERY  04/22/2013    Stent placement   • MAZE PROCEDURE     • MITRAL VALVE REPLACEMENT     • TONSILLECTOMY     • TRICUSPID VALVE REPLACEMENT         Social History     Socioeconomic History   • Marital status:    • Number of children: 2   Tobacco Use   • Smoking status: Former Smoker     Packs/day: 3.00     Years: 54.00     Pack years: 162.00     Quit date: 2007     Years since quitting: 15.1   • Smokeless tobacco: Never Used   • Tobacco comment: caffeine - tea or mt dew    Vaping Use   • Vaping Use: Never used   Substance and Sexual Activity   • Alcohol use: No   • Drug use: No   • Sexual activity: Defer       Family History   Adopted: Yes   Problem Relation Age of Onset   • No Known Problems Mother    • No Known Problems Father        Review of Systems   Constitutional: Positive for malaise/fatigue.   Cardiovascular: Positive for dyspnea on exertion.   All other systems reviewed and are negative.      Allergies   Allergen Reactions   • Bupropion Itching   • Cephalexin Itching     Tolerated piperacillin/tazobactam, ampicillin, cefdinir, and ceftriaxone   • Metoprolol Itching         Current Outpatient  Medications:   •  albuterol (PROVENTIL) (2.5 MG/3ML) 0.083% nebulizer solution, USE 1 VIAL VIA NEBULIZER EVERY FOUR HOURS AS NEEDED FOR WHEEZING, Disp: 150 mL, Rfl: 3  •  albuterol sulfate  (90 Base) MCG/ACT inhaler, Inhale 2 puffs Every 4 (Four) Hours As Needed for Wheezing., Disp: 18 g, Rfl: 3  •  amLODIPine (NORVASC) 10 MG tablet, TAKE 1 TABLET BY MOUTH EVERY DAY, Disp: 90 tablet, Rfl: 1  •  aspirin 81 MG EC tablet, Take 1 tablet by mouth Daily., Disp: , Rfl:   •  atorvastatin (LIPITOR) 40 MG tablet, TAKE 1 TABLET BY MOUTH EVERY DAY, Disp: 90 tablet, Rfl: 2  •  butalbital-acetaminophen-caffeine (FIORICET, ESGIC) -40 MG per tablet, Take 1 tablet by mouth Every 6 (Six) Hours As Needed for Headache., Disp: 4 tablet, Rfl: 0  •  carvedilol (COREG) 6.25 MG tablet, TAKE 1 TABLET BY MOUTH TWICE A DAY WITH MEALS, Disp: 180 tablet, Rfl: 2  •  cyclobenzaprine (FLEXERIL) 10 MG tablet, Take 1 tablet by mouth every night at bedtime., Disp: 90 tablet, Rfl: 3  •  fluticasone-salmeterol (Advair Diskus) 250-50 MCG/DOSE DISKUS, Inhale 1 puff 2 (Two) Times a Day., Disp: 1 each, Rfl: 0  •  furosemide (LASIX) 40 MG tablet, Take 1 tablet by mouth 2 (Two) Times a Day., Disp: 60 tablet, Rfl: 0  •  guaiFENesin (MUCINEX) 600 MG 12 hr tablet, Take 600 mg by mouth 2 (Two) Times a Day., Disp: , Rfl:   •  HYDROcodone-acetaminophen (NORCO) 5-325 MG per tablet, Take 1 tablet by mouth Every 8 (Eight) Hours As Needed for Moderate Pain . Chronic pain medicine for low back pain, Disp: 90 tablet, Rfl: 0  •  lisinopril (PRINIVIL,ZESTRIL) 40 MG tablet, Take 40 mg by mouth Daily., Disp: , Rfl:   •  loratadine (Claritin) 10 MG tablet, Take 10 mg by mouth 2 (two) times a day., Disp: , Rfl:   •  magnesium oxide (MAG-OX) 400 MG tablet, TAKE 1 TABLET BY MOUTH EVERY DAY, Disp: 90 tablet, Rfl: 1  •  meclizine (ANTIVERT) 25 MG tablet, Take 1 tablet by mouth 3 (Three) Times a Day As Needed for Dizziness. prn, Disp: 30 tablet, Rfl: 3  •  Multiple  Vitamins-Minerals (MULTIVITAL) tablet, Take 1 tablet by mouth Daily., Disp: , Rfl:   •  Nebulizer device, 1 Units 4 (Four) Times a Day As Needed (Wheezing). J44.1 j20.8, Disp: 1 each, Rfl: 0  •  O2 (OXYGEN), Inhale 3 L/min Continuous., Disp: , Rfl:   •  Omega-3 Fatty Acids (fish oil) 1000 MG capsule capsule, Take  by mouth Every Night., Disp: , Rfl:   •  omeprazole (priLOSEC) 40 MG capsule, Take 1 capsule by mouth Daily., Disp: 90 capsule, Rfl: 1  •  ondansetron (ZOFRAN) 4 MG tablet, Take 1 tablet by mouth Every 12 (Twelve) Hours As Needed for Nausea or Vomiting., Disp: 90 tablet, Rfl: 3  •  polyethylene glycol (MIRALAX) packet, Take 17 g by mouth 2 (Two) Times a Day. (Patient taking differently: Take 17 g by mouth 2 (Two) Times a Day As Needed.), Disp: , Rfl:   •  potassium chloride 10 MEQ CR tablet, TAKE 1 TABLET BY MOUTH EVERY DAY, Disp: 90 tablet, Rfl: 1  •  roflumilast (DALIRESP) 500 MCG tablet tablet, Take 1 tablet by mouth Daily., Disp: 90 tablet, Rfl: 0  •  rOPINIRole (REQUIP) 2 MG tablet, TAKE 1 TABLET BY MOUTH EVERY DAY EVERY NIGHT, Disp: 90 tablet, Rfl: 2  •  sertraline (ZOLOFT) 100 MG tablet, TAKE 1 TABLET BY MOUTH EVERY DAY, Disp: 90 tablet, Rfl: 1  •  Sodium Chloride Flush (sodium chloride 0.9 % flush) 0.9 % solution, , Disp: , Rfl:   •  Spiriva HandiHaler 18 MCG per inhalation capsule, PLACE 1 CAPSULE INTO INHALER AND INHALE DAILY. *NEEDS TO MAKE APPT*, Disp: 30 capsule, Rfl: 0  •  Symjepi 0.3 MG/0.3ML solution prefilled syringe, , Disp: , Rfl:   •  zaleplon (SONATA) 10 MG capsule, Take 1 capsule by mouth Every Night., Disp: 30 capsule, Rfl: 3  •  azithromycin (Zithromax Z-Antonio) 250 MG tablet, Take 2 tablets by mouth on day 1, then 1 tablet daily on days 2-5, Disp: 6 tablet, Rfl: 0  •  benzonatate (TESSALON) 100 MG capsule, TAKE 1 CAPSULE BY MOUTH 2 TIMES A DAY AS NEEDED FOR COUGH, Disp: 180 capsule, Rfl: 1      Objective:     Vitals:    02/03/22 1037 02/03/22 1044   BP: 142/80 146/76   BP Location:  "Right arm Left arm   Patient Position: Sitting Sitting   Cuff Size: Adult Adult   Pulse: 82    Resp: 16    SpO2: 97%    Weight: 63 kg (139 lb)    Height: 170.2 cm (67.01\")      Body mass index is 21.77 kg/m².    Vitals reviewed.   Constitutional:       Appearance: Well-developed.      Comments: Awake, alert, small stature, on O2, but NAD   Eyes:      Conjunctiva/sclera: Conjunctivae normal.   HENT:      Head: Normocephalic.      Nose: Nose normal.         Comments: masked  Neck:      Vascular: No JVD.      Lymphadenopathy: No cervical adenopathy.   Pulmonary:      Effort: Pulmonary effort is normal.      Breath sounds: Normal breath sounds. Decreased air movement present.   Cardiovascular:      Normal rate. Regular rhythm.      Murmurs: There is no murmur.   Pulses:     Intact distal pulses.   Edema:     Peripheral edema absent.   Abdominal:      Palpations: Abdomen is soft.      Tenderness: There is no abdominal tenderness.   Musculoskeletal: Normal range of motion.      Cervical back: Normal range of motion. Skin:     General: Skin is warm and dry.      Findings: No rash.   Neurological:      General: No focal deficit present.      Mental Status: Alert, oriented to person, place, and time and oriented to person, place and time.      Cranial Nerves: No cranial nerve deficit.   Psychiatric:         Behavior: Behavior normal.         Thought Content: Thought content normal.         Judgment: Judgment normal.           ECG 12 Lead    Date/Time: 2/3/2022 12:09 PM  Performed by: Rodolfo Null MD  Authorized by: Rodolfo Null MD   Comparison: compared with previous ECG   Similar to previous ECG  Rhythm: sinus rhythm  Ectopy: atrial premature contractions  Conduction: conduction normal  T Waves: T waves normal  QRS axis: normal  Other: no other findings  Other findings: non-specific ST-T wave changes    Clinical impression: non-specific ECG              Assessment:       Diagnosis Plan   1. History of endocarditis  " Adult Transthoracic Echo Complete W/ Cont if Necessary Per Protocol   2. History of mitral valve replacement with tissue graft  Adult Transthoracic Echo Complete W/ Cont if Necessary Per Protocol   3. S/P TVR (tricuspid valve repair)  Adult Transthoracic Echo Complete W/ Cont if Necessary Per Protocol   4. PAF (paroxysmal atrial fibrillation) ([facility])     5. Primary hypertension            Plan:       1/2/3.  She has undergone tissue mitral valve replacement and tricuspid valve repair.  She developed recurrent strep bacteremia in 2021 and had a small vegetation on the mitral valve.  She completed 6 weeks of IV antibiotics and 3 months of oral antibiotics.  I will repeat an echocardiogram at this time.    4.  She has had no evidence of recurrence.  She is status post maze procedure and left atrial appendage ligation.  She had a GI bleed while on warfarin in the past.    5.  Her blood pressure is within goal for her small stature.    Sincerely,       Rodolfo Null MD

## 2022-02-14 NOTE — TELEPHONE ENCOUNTER
"Pt arrives via EMS from hotel room. EMS reports pt called 911 because she was concerned for her safety due to ""trump supporters outside hotel\"". EMS reports no one was outside the hotel at that time. Upon arrival, pt hyper-verbal and tangential in her speaking. Hx of MS    Pt remains calm and cooperative at this time. AOX4  denies SI, denies HI, denies hallucinations     Pt clothing removed and examined for harmful items. Pt clothing and belongings inventoried. [id number], [id number], [id number]  Sent with public safety. 1 to 1 sitter remains at [facility]. Blood drawn and sent to lab.        " Called pt informed sent and to follow up if symptoms persist

## 2022-02-16 ENCOUNTER — HOSPITAL ENCOUNTER (OUTPATIENT)
Dept: CARDIOLOGY | Facility: HOSPITAL | Age: 67
Discharge: HOME | End: 2022-02-16
Admitting: INTERNAL MEDICINE

## 2022-02-16 VITALS
HEIGHT: 67 IN | DIASTOLIC BLOOD PRESSURE: 80 MMHG | WEIGHT: 139 LBS | BODY MASS INDEX: 21.82 KG/M2 | HEART RATE: 75 BPM | SYSTOLIC BLOOD PRESSURE: 180 MMHG

## 2022-02-16 DIAGNOSIS — Z98.890 S/P TVR (TRICUSPID VALVE REPAIR): ICD-10-CM

## 2022-02-16 DIAGNOSIS — Z95.4 HISTORY OF MITRAL VALVE REPLACEMENT WITH TISSUE GRAFT: ICD-10-CM

## 2022-02-16 DIAGNOSIS — Z86.79 HISTORY OF ENDOCARDITIS: ICD-10-CM

## 2022-02-16 PROCEDURE — 93306 TTE W/DOPPLER COMPLETE: CPT

## 2022-02-16 PROCEDURE — 93306 TTE W/DOPPLER COMPLETE: CPT | Performed by: INTERNAL MEDICINE

## 2022-02-17 LAB
ASCENDING AORTA: 2.7 CM
BH CV ECHO MEAS - ACS: 1.5 CM
BH CV ECHO MEAS - AO MAX PG (FULL): 1.9 MMHG
BH CV ECHO MEAS - AO MAX PG: 7.2 MMHG
BH CV ECHO MEAS - AO MEAN PG (FULL): 0.81 MMHG
BH CV ECHO MEAS - AO MEAN PG: 3.8 MMHG
BH CV ECHO MEAS - AO ROOT AREA (BSA CORRECTED): 1.4
BH CV ECHO MEAS - AO ROOT AREA: 4.6 CM^2
BH CV ECHO MEAS - AO ROOT DIAM: 2.4 CM
BH CV ECHO MEAS - AO V2 MAX: 134.4 CM/SEC
BH CV ECHO MEAS - AO V2 MEAN: 89.6 CM/SEC
BH CV ECHO MEAS - AO V2 VTI: 28.2 CM
BH CV ECHO MEAS - ASC AORTA: 2.7 CM
BH CV ECHO MEAS - AVA(I,A): 2.6 CM^2
BH CV ECHO MEAS - AVA(I,D): 2.6 CM^2
BH CV ECHO MEAS - AVA(V,A): 2.4 CM^2
BH CV ECHO MEAS - AVA(V,D): 2.4 CM^2
BH CV ECHO MEAS - BSA(HAYCOCK): 1.7 M^2
BH CV ECHO MEAS - BSA: 1.7 M^2
BH CV ECHO MEAS - BZI_BMI: 21.8 KILOGRAMS/M^2
BH CV ECHO MEAS - BZI_METRIC_HEIGHT: 170.2 CM
BH CV ECHO MEAS - BZI_METRIC_WEIGHT: 63.1 KG
BH CV ECHO MEAS - EDV(CUBED): 92.8 ML
BH CV ECHO MEAS - EDV(MOD-SP2): 49 ML
BH CV ECHO MEAS - EDV(MOD-SP4): 51 ML
BH CV ECHO MEAS - EDV(TEICH): 93.8 ML
BH CV ECHO MEAS - EF(CUBED): 66.5 %
BH CV ECHO MEAS - EF(MOD-BP): 54.8 %
BH CV ECHO MEAS - EF(MOD-SP2): 55.1 %
BH CV ECHO MEAS - EF(MOD-SP4): 54.9 %
BH CV ECHO MEAS - EF(TEICH): 58.2 %
BH CV ECHO MEAS - ESV(CUBED): 31.1 ML
BH CV ECHO MEAS - ESV(MOD-SP2): 22 ML
BH CV ECHO MEAS - ESV(MOD-SP4): 23 ML
BH CV ECHO MEAS - ESV(TEICH): 39.2 ML
BH CV ECHO MEAS - FS: 30.6 %
BH CV ECHO MEAS - IVS/LVPW: 1.1
BH CV ECHO MEAS - IVSD: 0.8 CM
BH CV ECHO MEAS - LAT PEAK E' VEL: 10.8 CM/SEC
BH CV ECHO MEAS - LV DIASTOLIC VOL/BSA (35-75): 29.4 ML/M^2
BH CV ECHO MEAS - LV MASS(C)D: 110 GRAMS
BH CV ECHO MEAS - LV MASS(C)DI: 63.5 GRAMS/M^2
BH CV ECHO MEAS - LV MAX PG: 5.3 MMHG
BH CV ECHO MEAS - LV MEAN PG: 3 MMHG
BH CV ECHO MEAS - LV SYSTOLIC VOL/BSA (12-30): 13.3 ML/M^2
BH CV ECHO MEAS - LV V1 MAX: 115 CM/SEC
BH CV ECHO MEAS - LV V1 MEAN: 78.7 CM/SEC
BH CV ECHO MEAS - LV V1 VTI: 26.4 CM
BH CV ECHO MEAS - LVIDD: 4.5 CM
BH CV ECHO MEAS - LVIDS: 3.1 CM
BH CV ECHO MEAS - LVLD AP2: 6.3 CM
BH CV ECHO MEAS - LVLD AP4: 6.5 CM
BH CV ECHO MEAS - LVLS AP2: 6.1 CM
BH CV ECHO MEAS - LVLS AP4: 5.6 CM
BH CV ECHO MEAS - LVOT AREA (M): 2.8 CM^2
BH CV ECHO MEAS - LVOT AREA: 2.8 CM^2
BH CV ECHO MEAS - LVOT DIAM: 1.9 CM
BH CV ECHO MEAS - LVPWD: 0.75 CM
BH CV ECHO MEAS - MED PEAK E' VEL: 7.9 CM/SEC
BH CV ECHO MEAS - MV DEC SLOPE: 961.8 CM/SEC^2
BH CV ECHO MEAS - MV DEC TIME: 0.18 SEC
BH CV ECHO MEAS - MV E MAX VEL: 193 CM/SEC
BH CV ECHO MEAS - MV MAX PG: 14.9 MMHG
BH CV ECHO MEAS - MV MEAN PG: 5.3 MMHG
BH CV ECHO MEAS - MV P1/2T MAX VEL: 200.9 CM/SEC
BH CV ECHO MEAS - MV P1/2T: 61.2 MSEC
BH CV ECHO MEAS - MV V2 MAX: 192.7 CM/SEC
BH CV ECHO MEAS - MV V2 MEAN: 99.5 CM/SEC
BH CV ECHO MEAS - MV V2 VTI: 40.7 CM
BH CV ECHO MEAS - MVA P1/2T LCG: 1.1 CM^2
BH CV ECHO MEAS - MVA(P1/2T): 3.6 CM^2
BH CV ECHO MEAS - MVA(VTI): 1.8 CM^2
BH CV ECHO MEAS - PA ACC TIME: 0.08 SEC
BH CV ECHO MEAS - PA MAX PG (FULL): 2 MMHG
BH CV ECHO MEAS - PA MAX PG: 5.8 MMHG
BH CV ECHO MEAS - PA PR(ACCEL): 41 MMHG
BH CV ECHO MEAS - PA V2 MAX: 120.8 CM/SEC
BH CV ECHO MEAS - PI END-D VEL: 143.9 CM/SEC
BH CV ECHO MEAS - PVA(V,A): 2.4 CM^2
BH CV ECHO MEAS - PVA(V,D): 2.4 CM^2
BH CV ECHO MEAS - QP/QS: 0.77
BH CV ECHO MEAS - RAP SYSTOLE: 3 MMHG
BH CV ECHO MEAS - RV MAX PG: 3.8 MMHG
BH CV ECHO MEAS - RV MEAN PG: 1.7 MMHG
BH CV ECHO MEAS - RV V1 MAX: 98.1 CM/SEC
BH CV ECHO MEAS - RV V1 MEAN: 59.4 CM/SEC
BH CV ECHO MEAS - RV V1 VTI: 18.8 CM
BH CV ECHO MEAS - RVOT AREA: 3 CM^2
BH CV ECHO MEAS - RVOT DIAM: 2 CM
BH CV ECHO MEAS - RVSP: 48 MMHG
BH CV ECHO MEAS - SI(AO): 75.3 ML/M^2
BH CV ECHO MEAS - SI(CUBED): 35.6 ML/M^2
BH CV ECHO MEAS - SI(LVOT): 42.1 ML/M^2
BH CV ECHO MEAS - SI(MOD-SP2): 15.6 ML/M^2
BH CV ECHO MEAS - SI(MOD-SP4): 16.2 ML/M^2
BH CV ECHO MEAS - SI(TEICH): 31.5 ML/M^2
BH CV ECHO MEAS - SUP REN AO DIAM: 1.5 CM
BH CV ECHO MEAS - SV(AO): 130.4 ML
BH CV ECHO MEAS - SV(CUBED): 61.7 ML
BH CV ECHO MEAS - SV(LVOT): 73 ML
BH CV ECHO MEAS - SV(MOD-SP2): 27 ML
BH CV ECHO MEAS - SV(MOD-SP4): 28 ML
BH CV ECHO MEAS - SV(RVOT): 56.2 ML
BH CV ECHO MEAS - SV(TEICH): 54.5 ML
BH CV ECHO MEAS - TAPSE (>1.6): 1.2 CM
BH CV ECHO MEAS - TR MAX VEL: 335.3 CM/SEC
BH CV ECHO MEASUREMENTS AVERAGE E/E' RATIO: 20.64
BH CV XLRA - RV BASE: 3.7 CM
BH CV XLRA - RV LENGTH: 6.1 CM
BH CV XLRA - RV MID: 2.6 CM
BH CV XLRA - TDI S': 6.4 CM/SEC
LEFT ATRIUM VOLUME INDEX: 22 ML/M2
MAXIMAL PREDICTED HEART RATE: 152 BPM
SINUS: 2.2 CM
STJ: 1.9 CM
STRESS TARGET HR: 129 BPM

## 2022-02-18 ENCOUNTER — TELEPHONE (OUTPATIENT)
Dept: CARDIOLOGY | Facility: CLINIC | Age: 67
End: 2022-02-18

## 2022-02-18 NOTE — TELEPHONE ENCOUNTER
Dr. Null is out of the office today and I was asked to call the patient with results.  She did not answer and I told her we will call her next week with the results.  She also sent Dr. Null a "[employer]" message.    Result Text  · Calculated left ventricular EF = 54.8% Estimated left ventricular EF was in agreement with the calculated left ventricular EF. Left ventricular systolic function is normal. Septal wall motion is abnormal, consistent with a post-operative state. Normal left ventricular cavity size noted. Left ventricular wall thickness is consistent with mild concentric hypertrophy. Left ventricular diastolic function was indeterminate.  · The right ventricular cavity is mildly dilated. Normal right ventricular systolic function noted.  · The left atrial cavity is mildly dilated.  · Right atrium not well visualized.  · The aortic valve is abnormal in structure. There is mild to moderate thickening of the aortic valve. The aortic valve appears trileaflet. Trace aortic valve regurgitation is present. No hemodynamically significant aortic valve stenosis is present.  · No significant mitral valve regurgitation is present. There is a 31 mm, porcine, [employer] bioprosthetic mitral valve present. The mitral valve peak and mean gradients are within defined limits. The prosthetic mitral valve is grossly normal.  · Mild to moderate tricuspid valve regurgitation is present. Estimated right ventricular systolic pressure from tricuspid regurgitation is moderately elevated (45-55 mmHg). Calculated right ventricular systolic pressure from tricuspid regurgitation is 48.0 mmHg. No evidence of significant tricuspid valve stenosis is present. There is a tricuspid ring valve present.

## 2022-02-21 NOTE — TELEPHONE ENCOUNTER
I called and spoke with her.  Her pulmonary function and right-sided findings are old and are due to her lung disease.  The rest of her echo was relatively unremarkable.  Please schedule her a 1 year follow-up with Melanie.    Sukumar chase

## 2022-02-23 ENCOUNTER — TRANSCRIBE ORDERS (OUTPATIENT)
Dept: ADMINISTRATIVE | Facility: HOSPITAL | Age: 67
End: 2022-02-23

## 2022-02-23 DIAGNOSIS — Z79.891 LONG TERM CURRENT USE OF OPIATE ANALGESIC: ICD-10-CM

## 2022-02-23 DIAGNOSIS — J92.9 PLEURAL THICKENING: Primary | ICD-10-CM

## 2022-02-23 RX ORDER — HYDROCODONE BITARTRATE AND ACETAMINOPHEN 5; 325 MG/1; MG/1
1 TABLET ORAL EVERY 8 HOURS PRN
Qty: 90 TABLET | Refills: 0 | Status: SHIPPED | OUTPATIENT
Start: 2022-02-23 | End: 2022-03-23 | Stop reason: SDUPTHER

## 2022-02-23 NOTE — TELEPHONE ENCOUNTER
Caller: Paz Browne    Relationship: Self    Best call back number: [phone]    Requested Prescriptions:   Requested Prescriptions     Pending Prescriptions Disp Refills   • HYDROcodone-acetaminophen (NORCO) 5-325 MG per tablet 90 tablet 0     Sig: Take 1 tablet by mouth Every 8 (Eight) Hours As Needed for Moderate Pain . Chronic pain medicine for low back pain        Pharmacy where request should be sent: [facility]/PHARMACY #[id number] - [address] AT [facility] - [phone] [facility] [phone] FX     Additional details provided by patient: PATIENT HAS 1 WEEK LEFT OF MEDICATION.     Does the patient have less than a 3 day supply:  [] Yes  [x] No    Harrison Green Rep   02/23/22 12:56 EST        [TextBox_4] : I reviewed all the previous sleep test that are available on file.\par I reviewed my previous office notes.\par

## 2022-02-28 RX ORDER — SERTRALINE HYDROCHLORIDE 100 MG/1
TABLET, FILM COATED ORAL
Qty: 90 TABLET | Refills: 1 | Status: SHIPPED | OUTPATIENT
Start: 2022-02-28 | End: 2022-12-09

## 2022-03-03 ENCOUNTER — TELEPHONE (OUTPATIENT)
Dept: FAMILY MEDICINE CLINIC | Facility: CLINIC | Age: 67
End: 2022-03-03

## 2022-03-03 RX ORDER — AZITHROMYCIN 250 MG/1
TABLET, FILM COATED ORAL
Qty: 6 TABLET | Refills: 0 | Status: SHIPPED | OUTPATIENT
Start: 2022-03-03 | End: 2022-03-09 | Stop reason: SDUPTHER

## 2022-03-03 NOTE — TELEPHONE ENCOUNTER
Caller: Paz Browne    Relationship: Self    Best call back number: [phone]    What medication are you requesting: ANTIBIOTIC    What are your current symptoms: WHEEZING AND FEVER    How long have you been experiencing symptoms: 03/02    Have you had these symptoms before:    [x] Yes  [] No    Have you been treated for these symptoms before:   [x] Yes  [] No    If a prescription is needed, what is your preferred pharmacy and phone number:  [facility]pharm[facility] #[id number] - [place], [place] - [address] [place] [address] - [phone]  - [phone]   [phone]    Additional notes: PATIENT HAS COPD AND BAD LUNGS AND STATES THAT DR. LAINEZ WILL USUALLY SEND SOMETHING IN BEFORE IT GETS BAD

## 2022-03-09 RX ORDER — AZITHROMYCIN 250 MG/1
TABLET, FILM COATED ORAL
Qty: 6 TABLET | Refills: 0 | Status: SHIPPED | OUTPATIENT
Start: 2022-03-09 | End: 2022-05-16 | Stop reason: SDUPTHER

## 2022-03-09 NOTE — TELEPHONE ENCOUNTER
PATIENT CALLED AND STATED THAT THE ANTIBIOTIC DID HELP, BUT IT STILL ISNT ALL THE WAY BETTER. WHEN SHE TAKES A DEEP BREATH, IT HURTS HER BACK. SHE WOULD LIKE TO SEE ABOUT GETTING A STEROID. OR ANOTHER ROUND OF ANTIBIOTICS.  PLEASE REACH OUT TO PATIENT AND ADVISE.

## 2022-03-21 RX ORDER — ALBUTEROL SULFATE 2.5 MG/3ML
SOLUTION RESPIRATORY (INHALATION)
Qty: 150 ML | Refills: 3 | Status: SHIPPED | OUTPATIENT
Start: 2022-03-21 | End: 2023-02-07

## 2022-03-23 DIAGNOSIS — Z79.891 LONG TERM CURRENT USE OF OPIATE ANALGESIC: ICD-10-CM

## 2022-03-23 RX ORDER — HYDROCODONE BITARTRATE AND ACETAMINOPHEN 5; 325 MG/1; MG/1
1 TABLET ORAL EVERY 8 HOURS PRN
Qty: 90 TABLET | Refills: 0 | Status: SHIPPED | OUTPATIENT
Start: 2022-03-23 | End: 2022-04-25 | Stop reason: SDUPTHER

## 2022-03-23 NOTE — TELEPHONE ENCOUNTER
Caller: Paz Browne    Relationship: Self    Best call back number: [phone]    Requested Prescriptions:   Requested Prescriptions     Pending Prescriptions Disp Refills   • HYDROcodone-acetaminophen (NORCO) 5-325 MG per tablet 90 tablet 0     Sig: Take 1 tablet by mouth Every 8 (Eight) Hours As Needed for Moderate Pain . Chronic pain medicine for low back pain        Pharmacy where request should be sent: [facility]/PHARMACY #[id number] - [place], [place] - [address] [place] [address] DRIVE AT [facility] [phone] [facility] [phone] FX         Does the patient have less than a 3 day supply:  [x] Yes  [] No    Harrison Springer Rep   03/23/22 08:14 EDT

## 2022-03-25 RX ORDER — CARVEDILOL 6.25 MG/1
TABLET ORAL
Qty: 180 TABLET | Refills: 2 | Status: SHIPPED | OUTPATIENT
Start: 2022-03-25 | End: 2022-12-09

## 2022-03-25 RX ORDER — ZALEPLON 10 MG/1
CAPSULE ORAL
Qty: 30 CAPSULE | Refills: 4 | Status: SHIPPED | OUTPATIENT
Start: 2022-03-25 | End: 2022-08-18

## 2022-04-25 DIAGNOSIS — Z79.891 LONG TERM CURRENT USE OF OPIATE ANALGESIC: ICD-10-CM

## 2022-04-25 RX ORDER — HYDROCODONE BITARTRATE AND ACETAMINOPHEN 5; 325 MG/1; MG/1
1 TABLET ORAL EVERY 8 HOURS PRN
Qty: 90 TABLET | Refills: 0 | Status: SHIPPED | OUTPATIENT
Start: 2022-04-25 | End: 2022-05-24 | Stop reason: SDUPTHER

## 2022-04-25 NOTE — TELEPHONE ENCOUNTER
Caller: Paz Browne    Relationship: Self    Best call back number:[phone]  Requested Prescriptions:   Requested Prescriptions     Pending Prescriptions Disp Refills   • HYDROcodone-acetaminophen (NORCO) 5-325 MG per tablet 90 tablet 0     Sig: Take 1 tablet by mouth Every 8 (Eight) Hours As Needed for Moderate Pain . Chronic pain medicine for low back pain        Pharmacy where request should be sent: [facility]/PHARMACY #[id number] - [place], [place] - [address] [place] [address] DRIVE AT [facility] [phone] [facility] [phone] FX         Does the patient have less than a 3 day supply:  [x] Yes  [] No    Harrison Springer Rep   04/25/22 08:56 EDT

## 2022-05-16 ENCOUNTER — TELEPHONE (OUTPATIENT)
Dept: FAMILY MEDICINE CLINIC | Facility: CLINIC | Age: 67
End: 2022-05-16

## 2022-05-16 RX ORDER — AZITHROMYCIN 250 MG/1
TABLET, FILM COATED ORAL
Qty: 6 TABLET | Refills: 0 | Status: SHIPPED | OUTPATIENT
Start: 2022-05-16 | End: 2022-06-17

## 2022-05-16 NOTE — TELEPHONE ENCOUNTER
Caller: Paz Browne    Relationship: Self    Best call back number: [phone] (H)    What medication are you requesting: ANTIBIOTIC FOR EAR PAIN IN LEFT EAR    What are your current symptoms: SHARP PAIN IN LEFT EAR SINCE Manfred MORNING, PATIENT STATES IT ALSO HURTS TO SWALLOW ON LEFT SIDE OF THROAT AS WELL    How long have you been experiencing symptoms: SINCE Manfred MORNING    Have you had these symptoms before:    [x] Yes  [] No    Have you been treated for these symptoms before:   [x] Yes  [] No    If a prescription is needed, what is your preferred pharmacy and phone number: [facility]/pharmacy #[id number] - [address] AT [facility] - [phone]  - [phone]       Additional notes: PATIENT IS ASKING FOR SOMETHING TO BE CALLED INTO PHARMACY ASAP, PLEASE ADVISE PATIENT IF SOMETHING CAN BE CALLED INTO PHARMACY ASAP

## 2022-05-20 RX ORDER — POTASSIUM CHLORIDE 750 MG/1
TABLET, FILM COATED, EXTENDED RELEASE ORAL
Qty: 90 TABLET | Refills: 1 | Status: SHIPPED | OUTPATIENT
Start: 2022-05-20 | End: 2022-06-17 | Stop reason: SDUPTHER

## 2022-05-24 DIAGNOSIS — Z79.891 LONG TERM CURRENT USE OF OPIATE ANALGESIC: ICD-10-CM

## 2022-05-24 RX ORDER — HYDROCODONE BITARTRATE AND ACETAMINOPHEN 5; 325 MG/1; MG/1
1 TABLET ORAL EVERY 8 HOURS PRN
Qty: 90 TABLET | Refills: 0 | Status: SHIPPED | OUTPATIENT
Start: 2022-05-24 | End: 2022-06-21 | Stop reason: SDUPTHER

## 2022-05-24 NOTE — TELEPHONE ENCOUNTER
Pt had appt for today at 530 for refill meds/lab- lab will not be here so she is rescheduling. Needs refill on hydrocodone until she gets in

## 2022-06-02 RX ORDER — AMLODIPINE BESYLATE 10 MG/1
TABLET ORAL
Qty: 90 TABLET | Refills: 1 | Status: SHIPPED | OUTPATIENT
Start: 2022-06-02 | End: 2022-12-09

## 2022-06-02 RX ORDER — ROPINIROLE 2 MG/1
TABLET, FILM COATED ORAL
Qty: 90 TABLET | Refills: 2 | Status: SHIPPED | OUTPATIENT
Start: 2022-06-02 | End: 2023-02-20

## 2022-06-17 ENCOUNTER — OFFICE VISIT (OUTPATIENT)
Dept: FAMILY MEDICINE CLINIC | Facility: CLINIC | Age: 67
End: 2022-06-17

## 2022-06-17 VITALS
OXYGEN SATURATION: 98 % | DIASTOLIC BLOOD PRESSURE: 72 MMHG | WEIGHT: 135 LBS | HEIGHT: 67 IN | BODY MASS INDEX: 21.19 KG/M2 | TEMPERATURE: 97.1 F | SYSTOLIC BLOOD PRESSURE: 110 MMHG | HEART RATE: 79 BPM

## 2022-06-17 DIAGNOSIS — E55.9 VITAMIN D DEFICIENCY, UNSPECIFIED: ICD-10-CM

## 2022-06-17 DIAGNOSIS — I10 ESSENTIAL HYPERTENSION: Primary | ICD-10-CM

## 2022-06-17 DIAGNOSIS — E78.00 PURE HYPERCHOLESTEROLEMIA: ICD-10-CM

## 2022-06-17 DIAGNOSIS — R79.89 ELEVATED FERRITIN: ICD-10-CM

## 2022-06-17 LAB
25(OH)D3 SERPL-MCNC: 54.6 NG/ML (ref 30–100)
ALBUMIN SERPL-MCNC: 5.2 G/DL (ref 3.5–5.2)
ALBUMIN/GLOB SERPL: 1.9 G/DL
ALP SERPL-CCNC: 99 U/L (ref 39–117)
ALT SERPL W P-5'-P-CCNC: 18 U/L (ref 1–33)
ANION GAP SERPL CALCULATED.3IONS-SCNC: 16 MMOL/L (ref 5–15)
AST SERPL-CCNC: 21 U/L (ref 1–32)
BILIRUB SERPL-MCNC: 0.8 MG/DL (ref 0–1.2)
BUN SERPL-MCNC: 12 MG/DL (ref 8–23)
BUN/CREAT SERPL: 12.8 (ref 7–25)
CALCIUM SPEC-SCNC: 10.3 MG/DL (ref 8.6–10.5)
CHLORIDE SERPL-SCNC: 96 MMOL/L (ref 98–107)
CHOLEST SERPL-MCNC: 220 MG/DL (ref 0–200)
CO2 SERPL-SCNC: 29 MMOL/L (ref 22–29)
CREAT SERPL-MCNC: 0.94 MG/DL (ref 0.57–1)
DEPRECATED RDW RBC AUTO: 38.4 FL (ref 37–54)
EGFRCR SERPLBLD CKD-EPI 2021: 65.8 ML/MIN/1.73
ERYTHROCYTE [DISTWIDTH] IN BLOOD BY AUTOMATED COUNT: 12.9 % (ref 12.3–15.4)
FERRITIN SERPL-MCNC: 315 NG/ML (ref 13–150)
GLOBULIN UR ELPH-MCNC: 2.7 GM/DL
GLUCOSE SERPL-MCNC: 93 MG/DL (ref 65–99)
HCT VFR BLD AUTO: 43.6 % (ref 34–46.6)
HDLC SERPL-MCNC: 77 MG/DL (ref 40–60)
HGB BLD-MCNC: 15.1 G/DL (ref 12–15.9)
LDLC SERPL CALC-MCNC: 119 MG/DL (ref 0–100)
LDLC/HDLC SERPL: 1.49 {RATIO}
MCH RBC QN AUTO: 28.9 PG (ref 26.6–33)
MCHC RBC AUTO-ENTMCNC: 34.6 G/DL (ref 31.5–35.7)
MCV RBC AUTO: 83.5 FL (ref 79–97)
PLATELET # BLD AUTO: 262 10*3/MM3 (ref 140–450)
PMV BLD AUTO: 10.4 FL (ref 6–12)
POTASSIUM SERPL-SCNC: 3.6 MMOL/L (ref 3.5–5.2)
PROT SERPL-MCNC: 7.9 G/DL (ref 6–8.5)
RBC # BLD AUTO: 5.22 10*6/MM3 (ref 3.77–5.28)
SODIUM SERPL-SCNC: 141 MMOL/L (ref 136–145)
TRIGL SERPL-MCNC: 140 MG/DL (ref 0–150)
VLDLC SERPL-MCNC: 24 MG/DL (ref 5–40)
WBC NRBC COR # BLD: 9.97 10*3/MM3 (ref 3.4–10.8)

## 2022-06-17 PROCEDURE — 82728 ASSAY OF FERRITIN: CPT | Performed by: INTERNAL MEDICINE

## 2022-06-17 PROCEDURE — 80053 COMPREHEN METABOLIC PANEL: CPT | Performed by: INTERNAL MEDICINE

## 2022-06-17 PROCEDURE — 82306 VITAMIN D 25 HYDROXY: CPT | Performed by: INTERNAL MEDICINE

## 2022-06-17 PROCEDURE — 85027 COMPLETE CBC AUTOMATED: CPT | Performed by: INTERNAL MEDICINE

## 2022-06-17 PROCEDURE — 80061 LIPID PANEL: CPT | Performed by: INTERNAL MEDICINE

## 2022-06-17 PROCEDURE — 99214 OFFICE O/P EST MOD 30 MIN: CPT | Performed by: INTERNAL MEDICINE

## 2022-06-17 PROCEDURE — 91305 COVID-19 (PFIZER) 12+ YRS: CPT | Performed by: INTERNAL MEDICINE

## 2022-06-17 PROCEDURE — 0054A COVID-19 (PFIZER) 12+ YRS: CPT | Performed by: INTERNAL MEDICINE

## 2022-06-17 RX ORDER — POTASSIUM CHLORIDE 750 MG/1
10 TABLET, FILM COATED, EXTENDED RELEASE ORAL DAILY
Qty: 90 TABLET | Refills: 1 | Status: SHIPPED | OUTPATIENT
Start: 2022-06-17 | End: 2022-10-19 | Stop reason: HOSPADM

## 2022-06-17 NOTE — PROGRESS NOTES
Subjective   Paz Browne is a 67 y.o. female.     Vitals:    06/17/22 1347   BP: 110/72   Pulse: 79   Temp: 97.1 °F (36.2 °C)   SpO2: 98%      Body mass index is 21.14 kg/m².     History of Present Illness   Patient was seen for hypertension.  Pressures been running 110s over 70s.  Patient will continue amlodipine 10 mg daily, carvedilol 6.25 mg twice daily, lisinopril 40 mg daily.  She will continue monitoring blood pressure at home.  Patient's lipids been controlled with diet exercise and atorvastatin 40 mg daily.  Patient is having labs drawn today.  Patient has a history of elevated ferritin.  Ferritin level was 359.  Patient is having it redrawn today.  Patient has a vitamin D3 deficiency is supplement with over-the-counter D3.  Levels are pending at the time of dictation.    Dictated utilizing Dragon dictation. If there are questions or for further clarification, please contact me.  The following portions of the patient's history were reviewed and updated as appropriate: allergies, current medications, past family history, past medical history, past social history, past surgical history and problem list.    Review of Systems   Constitutional: Negative for fatigue and fever.   HENT: Positive for congestion. Negative for trouble swallowing.    Eyes: Negative for discharge and visual disturbance.   Respiratory: Negative for choking and shortness of breath.    Cardiovascular: Negative for chest pain and palpitations.   Gastrointestinal: Negative for abdominal pain and blood in stool.   Endocrine: Negative.    Genitourinary: Negative for genital sores and hematuria.   Musculoskeletal: Negative for gait problem and joint swelling.   Skin: Negative for color change, pallor, rash and wound.   Allergic/Immunologic: Positive for environmental allergies. Negative for immunocompromised state.   Neurological: Negative for facial asymmetry and speech difficulty.   Psychiatric/Behavioral: Negative for hallucinations and  suicidal ideas.       Objective   Physical Exam  Vitals and nursing note reviewed.   Constitutional:       Appearance: Normal appearance. She is well-developed.   HENT:      Head: Normocephalic and atraumatic.      Nose: Nose normal.      Mouth/Throat:      Mouth: Mucous membranes are moist.      Pharynx: Oropharynx is clear.   Eyes:      Extraocular Movements: Extraocular movements intact.      Conjunctiva/sclera: Conjunctivae normal.      Pupils: Pupils are equal, round, and reactive to light.   Cardiovascular:      Rate and Rhythm: Normal rate and regular rhythm.      Heart sounds: Normal heart sounds. No murmur heard.    No friction rub. No gallop.   Pulmonary:      Effort: Pulmonary effort is normal. No respiratory distress.      Breath sounds: Normal breath sounds. No stridor. No wheezing, rhonchi or rales.   Chest:      Chest wall: No tenderness.   Abdominal:      General: Bowel sounds are normal.      Palpations: Abdomen is soft.   Musculoskeletal:         General: Normal range of motion.      Cervical back: Normal range of motion and neck supple.   Skin:     General: Skin is warm and dry.   Neurological:      General: No focal deficit present.      Mental Status: She is alert and oriented to person, place, and time. Mental status is at baseline.   Psychiatric:         Mood and Affect: Mood normal.         Behavior: Behavior normal.         Thought Content: Thought content normal.         Judgment: Judgment normal.         Assessment & Plan #1 labs to continue monitoring blood pressure #3 continue present diet and activity level  Problems Addressed this Visit        Cardiac and Vasculature    Hyperlipidemia      Other Visit Diagnoses     Essential hypertension    -  Primary    Relevant Orders    CBC (No Diff)    Comprehensive Metabolic Panel    Lipid Panel    Vitamin D 25 Hydroxy    Vitamin D deficiency, unspecified        Relevant Orders    CBC (No Diff)    Comprehensive Metabolic Panel    Lipid Panel     Vitamin D 25 Hydroxy    Elevated ferritin        Relevant Orders    CBC (No Diff)    Comprehensive Metabolic Panel    Lipid Panel    Vitamin D 25 Hydroxy    Ferritin      Diagnoses     Diagnosis Codes Comments    Essential hypertension    -  Primary ICD-10-CM: I10  ICD-9-CM: 401.9     Vitamin D deficiency, unspecified     ICD-10-CM: E55.9  ICD-9-CM: 268.9     Elevated ferritin     ICD-10-CM: R79.89  ICD-9-CM: 790.6     Pure hypercholesterolemia     ICD-10-CM: E78.00  ICD-9-CM: 272.0

## 2022-06-21 ENCOUNTER — TELEPHONE (OUTPATIENT)
Dept: FAMILY MEDICINE CLINIC | Facility: CLINIC | Age: 67
End: 2022-06-21

## 2022-06-21 DIAGNOSIS — Z79.891 LONG TERM CURRENT USE OF OPIATE ANALGESIC: ICD-10-CM

## 2022-06-21 RX ORDER — HYDROCODONE BITARTRATE AND ACETAMINOPHEN 5; 325 MG/1; MG/1
1 TABLET ORAL EVERY 8 HOURS PRN
Qty: 90 TABLET | Refills: 0 | Status: SHIPPED | OUTPATIENT
Start: 2022-06-21 | End: 2022-07-24 | Stop reason: SDUPTHER

## 2022-06-21 NOTE — TELEPHONE ENCOUNTER
Caller: Paz Browne    Relationship: Self    Best call back number: [phone]    Requested Prescriptions:   Requested Prescriptions     Pending Prescriptions Disp Refills   • HYDROcodone-acetaminophen (NORCO) 5-325 MG per tablet 90 tablet 0     Sig: Take 1 tablet by mouth Every 8 (Eight) Hours As Needed for Moderate Pain . Chronic pain medicine for low back pain        Pharmacy where request should be sent: [facility]ACY #[id number] - [address] AT [facility] [phone] [facility] [phone] FX     Does the patient have less than a 3 day supply:  [x] Yes  [] No    Harrison Gomez Rep   06/21/22 08:37 EDT

## 2022-06-29 RX ORDER — OMEPRAZOLE 40 MG/1
CAPSULE, DELAYED RELEASE ORAL
Qty: 90 CAPSULE | Refills: 1 | Status: SHIPPED | OUTPATIENT
Start: 2022-06-29 | End: 2022-12-09

## 2022-07-15 RX ORDER — FUROSEMIDE 40 MG/1
TABLET ORAL
Qty: 60 TABLET | Refills: 0 | Status: SHIPPED | OUTPATIENT
Start: 2022-07-15 | End: 2022-07-25

## 2022-07-19 NOTE — TELEPHONE ENCOUNTER
Pt informed     [facility]:  I discussed with the patient the risks of golimumab including but not limited to myelosuppression, immunosuppression, autoimmune hepatitis, demyelinating diseases, lymphoma, and serious infections.  The patient understands that monitoring is required including a PPD at baseline and must alert us or the primary physician if symptoms of infection or other concerning signs are noted.

## 2022-07-21 DIAGNOSIS — Z79.891 LONG TERM CURRENT USE OF OPIATE ANALGESIC: ICD-10-CM

## 2022-07-21 NOTE — TELEPHONE ENCOUNTER
Caller: Paz Browne    Relationship: Self    Best call back number: [phone]    Requested Prescriptions:   Requested Prescriptions     Pending Prescriptions Disp Refills   • HYDROcodone-acetaminophen (NORCO) 5-325 MG per tablet 90 tablet 0     Sig: Take 1 tablet by mouth Every 8 (Eight) Hours As Needed for Moderate Pain . Chronic pain medicine for low back pain        Pharmacy where request should be sent: [facility] #[id number] [address] AT [facility] - [phone] [facility] [phone] FX     Additional details provided by patient: PT HAS 4 DAYS LEFT OF MEDICATION.     Does the patient have less than a 3 day supply:  [] Yes  [] No    Harrison Green Rep   07/21/22 08:08 EDT

## 2022-07-24 DIAGNOSIS — Z79.891 LONG TERM CURRENT USE OF OPIATE ANALGESIC: ICD-10-CM

## 2022-07-25 RX ORDER — HYDROCODONE BITARTRATE AND ACETAMINOPHEN 5; 325 MG/1; MG/1
1 TABLET ORAL EVERY 8 HOURS PRN
Qty: 90 TABLET | Refills: 0 | Status: SHIPPED | OUTPATIENT
Start: 2022-07-25 | End: 2022-07-27 | Stop reason: SDUPTHER

## 2022-07-25 RX ORDER — FUROSEMIDE 40 MG/1
TABLET ORAL
Qty: 60 TABLET | Refills: 0 | Status: SHIPPED | OUTPATIENT
Start: 2022-07-25 | End: 2022-09-19

## 2022-07-25 NOTE — TELEPHONE ENCOUNTER
Caller: Paz Browne    Relationship: Self    Best call back number: [phone]      PATIENT IS CALLING TO CHECK ON THE STATUS OF THIS REQUEST. PLEASE CALL AND ADVISE.

## 2022-07-27 ENCOUNTER — TELEPHONE (OUTPATIENT)
Dept: FAMILY MEDICINE CLINIC | Facility: CLINIC | Age: 67
End: 2022-07-27

## 2022-07-27 DIAGNOSIS — Z79.891 LONG TERM CURRENT USE OF OPIATE ANALGESIC: ICD-10-CM

## 2022-07-27 RX ORDER — HYDROCODONE BITARTRATE AND ACETAMINOPHEN 5; 325 MG/1; MG/1
1 TABLET ORAL EVERY 8 HOURS PRN
Qty: 90 TABLET | Refills: 0 | Status: SHIPPED | OUTPATIENT
Start: 2022-07-27 | End: 2022-08-23 | Stop reason: SDUPTHER

## 2022-07-27 RX ORDER — HYDROCODONE BITARTRATE AND ACETAMINOPHEN 5; 325 MG/1; MG/1
1 TABLET ORAL EVERY 8 HOURS PRN
Qty: 90 TABLET | Refills: 0 | Status: CANCELLED | OUTPATIENT
Start: 2022-07-27

## 2022-07-27 NOTE — TELEPHONE ENCOUNTER
Needs norco sent to [facility] due to [facility] no longer having norco til late august

## 2022-08-09 RX ORDER — LANOLIN ALCOHOL/MO/W.PET/CERES
CREAM (GRAM) TOPICAL
Qty: 90 TABLET | Refills: 1 | Status: SHIPPED | OUTPATIENT
Start: 2022-08-09 | End: 2023-02-20

## 2022-08-12 ENCOUNTER — TELEPHONE (OUTPATIENT)
Dept: FAMILY MEDICINE CLINIC | Facility: CLINIC | Age: 67
End: 2022-08-12

## 2022-08-12 DIAGNOSIS — T17.908S ASPIRATION INTO AIRWAY, SEQUELA: Primary | ICD-10-CM

## 2022-08-12 RX ORDER — DOXYCYCLINE HYCLATE 100 MG
100 TABLET ORAL 2 TIMES DAILY
Qty: 20 TABLET | Refills: 0 | Status: SHIPPED | OUTPATIENT
Start: 2022-08-12 | End: 2022-10-19 | Stop reason: HOSPADM

## 2022-08-12 NOTE — TELEPHONE ENCOUNTER
Caller: Paz Browne    Relationship: Self    Best call back number: [phone]    What medication are you requesting: ANTIBIOTIC     What are your current symptoms: LUNG CONGESTION     How long have you been experiencing symptoms: 2 DAYS     If a prescription is needed, what is your preferred pharmacy and phone number: [facility]/PHARMACY #[id number] - [place], [place] - [address] AT [facility] - [phone]  - [phone]      Additional notes: PATIENT ASPIRATED Wednesday NIGHT, SHE WOKE UP WITH VOMIT COMING UP IN HER MOUTH AND BREATHED IT IN. WITH HER LUNG ISSUES ALREADY SHE IS AFRAID IT MAY CAUSE PNEUMONIA SO SHE IS REQUESTING AN ANTIBIOTIC AS SORT OF A PRECAUTION.

## 2022-08-12 NOTE — TELEPHONE ENCOUNTER
Patient needs to see speech therapy and get treated for the aspiration.  Patient just cannot take antibiotics when she aspirates.  Patient needs to prevent it.

## 2022-08-16 DIAGNOSIS — R13.10 DYSPHAGIA, UNSPECIFIED TYPE: Primary | ICD-10-CM

## 2022-08-18 RX ORDER — ZALEPLON 10 MG/1
CAPSULE ORAL
Qty: 30 CAPSULE | Refills: 3 | Status: SHIPPED | OUTPATIENT
Start: 2022-08-18 | End: 2022-09-26 | Stop reason: SDUPTHER

## 2022-08-23 DIAGNOSIS — Z79.891 LONG TERM CURRENT USE OF OPIATE ANALGESIC: ICD-10-CM

## 2022-08-23 RX ORDER — HYDROCODONE BITARTRATE AND ACETAMINOPHEN 5; 325 MG/1; MG/1
1 TABLET ORAL EVERY 8 HOURS PRN
Qty: 90 TABLET | Refills: 0 | Status: SHIPPED | OUTPATIENT
Start: 2022-08-23 | End: 2022-09-21 | Stop reason: SDUPTHER

## 2022-08-23 NOTE — TELEPHONE ENCOUNTER
Caller: Paz Browne    Relationship: Self    Best call back number: [phone]    Requested Prescriptions:   Requested Prescriptions     Pending Prescriptions Disp Refills   • HYDROcodone-acetaminophen (NORCO) 5-325 MG per tablet 90 tablet 0     Sig: Take 1 tablet by mouth Every 8 (Eight) Hours As Needed for Moderate Pain . Chronic pain medicine for low back pain        Pharmacy where request should be sent: [facility]/PHARMACY #[id number] - [address] AT [facility] - [phone] [facility] [phone] FX     Additional details provided by patient: 4-5 DAYS LEFT    Does the patient have less than a 3 day supply:  [x] Yes  [] No    Harrison BROWNING Rep   08/23/22 12:04 EDT

## 2022-09-17 ENCOUNTER — TELEPHONE (OUTPATIENT)
Dept: FAMILY MEDICINE CLINIC | Facility: CLINIC | Age: 67
End: 2022-09-17

## 2022-09-17 NOTE — TELEPHONE ENCOUNTER
Pt  called requesting antibiotics and steroid due to purulent green cough.  I advised to go to ER or urgent care to be evaluated. He stated he would do that.

## 2022-09-18 ENCOUNTER — APPOINTMENT (OUTPATIENT)
Dept: GENERAL RADIOLOGY | Facility: HOSPITAL | Age: 67
End: 2022-09-18

## 2022-09-18 ENCOUNTER — HOSPITAL ENCOUNTER (EMERGENCY)
Facility: HOSPITAL | Age: 67
Discharge: HOME | End: 2022-09-18
Attending: EMERGENCY MEDICINE | Admitting: EMERGENCY MEDICINE

## 2022-09-18 VITALS
HEART RATE: 78 BPM | WEIGHT: 130 LBS | OXYGEN SATURATION: 100 % | SYSTOLIC BLOOD PRESSURE: 178 MMHG | BODY MASS INDEX: 20.4 KG/M2 | HEIGHT: 67 IN | DIASTOLIC BLOOD PRESSURE: 94 MMHG | RESPIRATION RATE: 20 BRPM | TEMPERATURE: 97.8 F

## 2022-09-18 DIAGNOSIS — E87.6 HYPOKALEMIA: ICD-10-CM

## 2022-09-18 DIAGNOSIS — J44.1 COPD WITH ACUTE EXACERBATION: Primary | ICD-10-CM

## 2022-09-18 DIAGNOSIS — R05.9 COUGH: ICD-10-CM

## 2022-09-18 LAB
ALBUMIN SERPL-MCNC: 4.9 G/DL (ref 3.5–5.2)
ALBUMIN/GLOB SERPL: 2.3 G/DL
ALP SERPL-CCNC: 102 U/L (ref 39–117)
ALT SERPL W P-5'-P-CCNC: 16 U/L (ref 1–33)
ANION GAP SERPL CALCULATED.3IONS-SCNC: 13.8 MMOL/L (ref 5–15)
AST SERPL-CCNC: 21 U/L (ref 1–32)
B PARAPERT DNA SPEC QL NAA+PROBE: NOT DETECTED
B PERT DNA SPEC QL NAA+PROBE: NOT DETECTED
BASOPHILS # BLD AUTO: 0.04 10*3/MM3 (ref 0–0.2)
BASOPHILS NFR BLD AUTO: 0.5 % (ref 0–1.5)
BILIRUB SERPL-MCNC: 0.6 MG/DL (ref 0–1.2)
BUN SERPL-MCNC: 13 MG/DL (ref 8–23)
BUN/CREAT SERPL: 16 (ref 7–25)
C PNEUM DNA NPH QL NAA+NON-PROBE: NOT DETECTED
CALCIUM SPEC-SCNC: 9.9 MG/DL (ref 8.6–10.5)
CHLORIDE SERPL-SCNC: 99 MMOL/L (ref 98–107)
CO2 SERPL-SCNC: 31.2 MMOL/L (ref 22–29)
CREAT SERPL-MCNC: 0.81 MG/DL (ref 0.57–1)
D-LACTATE SERPL-SCNC: 1.2 MMOL/L (ref 0.5–2)
DEPRECATED RDW RBC AUTO: 40.7 FL (ref 37–54)
EGFRCR SERPLBLD CKD-EPI 2021: 78.7 ML/MIN/1.73
EOSINOPHIL # BLD AUTO: 0.15 10*3/MM3 (ref 0–0.4)
EOSINOPHIL NFR BLD AUTO: 1.8 % (ref 0.3–6.2)
ERYTHROCYTE [DISTWIDTH] IN BLOOD BY AUTOMATED COUNT: 12.7 % (ref 12.3–15.4)
FLUAV SUBTYP SPEC NAA+PROBE: NOT DETECTED
FLUBV RNA ISLT QL NAA+PROBE: NOT DETECTED
GLOBULIN UR ELPH-MCNC: 2.1 GM/DL
GLUCOSE SERPL-MCNC: 113 MG/DL (ref 65–99)
HADV DNA SPEC NAA+PROBE: NOT DETECTED
HCOV 229E RNA SPEC QL NAA+PROBE: NOT DETECTED
HCOV HKU1 RNA SPEC QL NAA+PROBE: NOT DETECTED
HCOV NL63 RNA SPEC QL NAA+PROBE: NOT DETECTED
HCOV OC43 RNA SPEC QL NAA+PROBE: NOT DETECTED
HCT VFR BLD AUTO: 41 % (ref 34–46.6)
HGB BLD-MCNC: 13.3 G/DL (ref 12–15.9)
HMPV RNA NPH QL NAA+NON-PROBE: NOT DETECTED
HPIV1 RNA ISLT QL NAA+PROBE: NOT DETECTED
HPIV2 RNA SPEC QL NAA+PROBE: NOT DETECTED
HPIV3 RNA NPH QL NAA+PROBE: NOT DETECTED
HPIV4 P GENE NPH QL NAA+PROBE: NOT DETECTED
IMM GRANULOCYTES # BLD AUTO: 0.03 10*3/MM3 (ref 0–0.05)
IMM GRANULOCYTES NFR BLD AUTO: 0.4 % (ref 0–0.5)
LYMPHOCYTES # BLD AUTO: 2.11 10*3/MM3 (ref 0.7–3.1)
LYMPHOCYTES NFR BLD AUTO: 25.2 % (ref 19.6–45.3)
M PNEUMO IGG SER IA-ACNC: NOT DETECTED
MAGNESIUM SERPL-MCNC: 1.8 MG/DL (ref 1.6–2.4)
MCH RBC QN AUTO: 28.1 PG (ref 26.6–33)
MCHC RBC AUTO-ENTMCNC: 32.4 G/DL (ref 31.5–35.7)
MCV RBC AUTO: 86.7 FL (ref 79–97)
MONOCYTES # BLD AUTO: 0.35 10*3/MM3 (ref 0.1–0.9)
MONOCYTES NFR BLD AUTO: 4.2 % (ref 5–12)
NEUTROPHILS NFR BLD AUTO: 5.7 10*3/MM3 (ref 1.7–7)
NEUTROPHILS NFR BLD AUTO: 67.9 % (ref 42.7–76)
NRBC BLD AUTO-RTO: 0 /100 WBC (ref 0–0.2)
NT-PROBNP SERPL-MCNC: 467 PG/ML (ref 0–900)
PLATELET # BLD AUTO: 257 10*3/MM3 (ref 140–450)
PMV BLD AUTO: 10 FL (ref 6–12)
POTASSIUM SERPL-SCNC: 2.9 MMOL/L (ref 3.5–5.2)
PROT SERPL-MCNC: 7 G/DL (ref 6–8.5)
QT INTERVAL: 433 MS
RBC # BLD AUTO: 4.73 10*6/MM3 (ref 3.77–5.28)
RHINOVIRUS RNA SPEC NAA+PROBE: NOT DETECTED
RSV RNA NPH QL NAA+NON-PROBE: NOT DETECTED
SARS-COV-2 RNA NPH QL NAA+NON-PROBE: NOT DETECTED
SODIUM SERPL-SCNC: 144 MMOL/L (ref 136–145)
TROPONIN T SERPL-MCNC: <0.01 NG/ML (ref 0–0.03)
WBC NRBC COR # BLD: 8.38 10*3/MM3 (ref 3.4–10.8)

## 2022-09-18 PROCEDURE — 96374 THER/PROPH/DIAG INJ IV PUSH: CPT

## 2022-09-18 PROCEDURE — 25010000002 METHYLPREDNISOLONE PER 125 MG: Performed by: EMERGENCY MEDICINE

## 2022-09-18 PROCEDURE — 0202U NFCT DS 22 TRGT SARS-COV-2: CPT | Performed by: EMERGENCY MEDICINE

## 2022-09-18 PROCEDURE — 71045 X-RAY EXAM CHEST 1 VIEW: CPT

## 2022-09-18 PROCEDURE — 99284 EMERGENCY DEPT VISIT MOD MDM: CPT

## 2022-09-18 PROCEDURE — 83605 ASSAY OF LACTIC ACID: CPT | Performed by: EMERGENCY MEDICINE

## 2022-09-18 PROCEDURE — 83735 ASSAY OF MAGNESIUM: CPT | Performed by: EMERGENCY MEDICINE

## 2022-09-18 PROCEDURE — 93005 ELECTROCARDIOGRAM TRACING: CPT | Performed by: EMERGENCY MEDICINE

## 2022-09-18 PROCEDURE — 83880 ASSAY OF NATRIURETIC PEPTIDE: CPT | Performed by: EMERGENCY MEDICINE

## 2022-09-18 PROCEDURE — 94664 DEMO&/EVAL PT USE INHALER: CPT

## 2022-09-18 PROCEDURE — 94761 N-INVAS EAR/PLS OXIMETRY MLT: CPT

## 2022-09-18 PROCEDURE — 93010 ELECTROCARDIOGRAM REPORT: CPT | Performed by: INTERNAL MEDICINE

## 2022-09-18 PROCEDURE — 80053 COMPREHEN METABOLIC PANEL: CPT | Performed by: EMERGENCY MEDICINE

## 2022-09-18 PROCEDURE — 84484 ASSAY OF TROPONIN QUANT: CPT | Performed by: EMERGENCY MEDICINE

## 2022-09-18 PROCEDURE — 63710000001 PREDNISONE PER 1 MG: Performed by: EMERGENCY MEDICINE

## 2022-09-18 PROCEDURE — 94799 UNLISTED PULMONARY SVC/PX: CPT

## 2022-09-18 PROCEDURE — 94640 AIRWAY INHALATION TREATMENT: CPT

## 2022-09-18 PROCEDURE — 85025 COMPLETE CBC W/AUTO DIFF WBC: CPT | Performed by: EMERGENCY MEDICINE

## 2022-09-18 RX ORDER — AZITHROMYCIN 250 MG/1
500 TABLET, FILM COATED ORAL ONCE
Status: COMPLETED | OUTPATIENT
Start: 2022-09-18 | End: 2022-09-18

## 2022-09-18 RX ORDER — SODIUM CHLORIDE 0.9 % (FLUSH) 0.9 %
10 SYRINGE (ML) INJECTION AS NEEDED
Status: DISCONTINUED | OUTPATIENT
Start: 2022-09-18 | End: 2022-09-18 | Stop reason: HOSPADM

## 2022-09-18 RX ORDER — IPRATROPIUM BROMIDE AND ALBUTEROL SULFATE 2.5; .5 MG/3ML; MG/3ML
3 SOLUTION RESPIRATORY (INHALATION) ONCE
Status: COMPLETED | OUTPATIENT
Start: 2022-09-18 | End: 2022-09-18

## 2022-09-18 RX ORDER — POTASSIUM CHLORIDE 750 MG/1
10 TABLET, FILM COATED, EXTENDED RELEASE ORAL DAILY
Qty: 5 TABLET | Refills: 0 | Status: SHIPPED | OUTPATIENT
Start: 2022-09-18 | End: 2023-01-09

## 2022-09-18 RX ORDER — PREDNISONE 20 MG/1
60 TABLET ORAL ONCE
Status: COMPLETED | OUTPATIENT
Start: 2022-09-18 | End: 2022-09-18

## 2022-09-18 RX ORDER — AZITHROMYCIN 250 MG/1
TABLET, FILM COATED ORAL
Qty: 4 TABLET | Refills: 0 | Status: SHIPPED | OUTPATIENT
Start: 2022-09-18 | End: 2022-10-19 | Stop reason: HOSPADM

## 2022-09-18 RX ORDER — METHYLPREDNISOLONE SODIUM SUCCINATE 125 MG/2ML
125 INJECTION, POWDER, LYOPHILIZED, FOR SOLUTION INTRAMUSCULAR; INTRAVENOUS ONCE
Status: COMPLETED | OUTPATIENT
Start: 2022-09-18 | End: 2022-09-18

## 2022-09-18 RX ORDER — POTASSIUM CHLORIDE 750 MG/1
40 TABLET, FILM COATED, EXTENDED RELEASE ORAL ONCE
Status: COMPLETED | OUTPATIENT
Start: 2022-09-18 | End: 2022-09-18

## 2022-09-18 RX ORDER — PREDNISONE 20 MG/1
TABLET ORAL
Qty: 10 TABLET | Refills: 0 | Status: SHIPPED | OUTPATIENT
Start: 2022-09-18 | End: 2022-10-04 | Stop reason: SDUPTHER

## 2022-09-18 RX ADMIN — PREDNISONE 60 MG: 20 TABLET ORAL at 18:24

## 2022-09-18 RX ADMIN — AZITHROMYCIN DIHYDRATE 500 MG: 250 TABLET, FILM COATED ORAL at 18:24

## 2022-09-18 RX ADMIN — POTASSIUM CHLORIDE 40 MEQ: 750 TABLET, EXTENDED RELEASE ORAL at 17:45

## 2022-09-18 RX ADMIN — IPRATROPIUM BROMIDE AND ALBUTEROL SULFATE 3 ML: .5; 3 SOLUTION RESPIRATORY (INHALATION) at 16:53

## 2022-09-18 RX ADMIN — METHYLPREDNISOLONE SODIUM SUCCINATE 125 MG: 125 INJECTION, POWDER, FOR SOLUTION INTRAMUSCULAR; INTRAVENOUS at 16:29

## 2022-09-18 NOTE — ED NOTES
Pt c/o shortness of breath. States she has been coughing up thick mucus for about 3 days. States it hurts to breathe. States she has a hx of CHF and heart valve replacement.     This RN wore mask, gloves, eyewear and all other appropriate PPE while performing patient care.

## 2022-09-18 NOTE — ED PROVIDER NOTES
EMERGENCY DEPARTMENT ENCOUNTER    Room Number:  19/19  Date of encounter:  9/18/2022  PCP: Javan Martinez MD  Historian: Patient    Patient was placed in face mask during triage process. Patient was wearing facemask when I entered the room and throughout our encounter. I wore full protective equipment throughout this patient encounter including a face mask, eye protection, and gloves. Hand hygiene was performed before donning protective equipment and again following doffing of PPE after leaving the room.    HPI:  Chief Complaint: Cough with dyspnea  A complete HPI/ROS/PMH/PSH/SH/FH are unobtainable due to: N/A   Context: Paz Browne is a 67 y.o. female with history of chronic hypoxemic respiratory failure requiring 2 L supplemental oxygen at baseline, COPD for which she follows with ji Solorzano and history of mitral valve replacement with tissue graft for which she follows with Dr. Null who presents to the ED c/o new cough over the last 3 days with associated shortness of breath.  She reports that her chest feels raw when she takes a deep breath.  She has had some chills but no reported fevers.  No hemoptysis though she is producing thick yellow sputum.  She has noted that her baseline dyspnea on exertion is worse over these last 3 days.  No change in appetite.  No black or bloody stools, dysuria or hematuria, syncope or near syncope reported.  Symptoms are moderate.      MEDICAL HISTORY REVIEW  EMR reviewed:    Cardiology note 2/3/2022-Dr. Null  1. History of endocarditis  Adult Transthoracic Echo Complete W/ Cont if Necessary Per Protocol   2. History of mitral valve replacement with tissue graft  Adult Transthoracic Echo Complete W/ Cont if Necessary Per Protocol   3. S/P TVR (tricuspid valve repair)  Adult Transthoracic Echo Complete W/ Cont if Necessary Per Protocol   4. PAF (paroxysmal atrial fibrillation) (HCC)      5. Primary hypertension           PAST MEDICAL HISTORY  Active  Ambulatory Problems     Diagnosis Date Noted   • PAF (paroxysmal atrial fibrillation) ([facility]) 01/25/2016   • Hypertension    • Hyperlipidemia    • Pain medication agreement 05/04/2016   • Hiatal hernia 05/04/2016   • Primary osteoarthritis involving multiple joints 05/04/2016   • Pulmonary hypertension ([facility])    • S/P TVR (tricuspid valve repair) 07/07/2016   • Pulmonary nodule 10/13/2016   • RLS (restless legs syndrome) 11/18/2016   • Chronic low back pain 08/02/2017   • Dysplastic polyp of colon 08/07/2017   • History of mitral valve replacement with tissue graft 08/11/2017   • Chronic respiratory failure with hypoxia ([facility]) 08/13/2017   • Anemia 08/09/2017   • Celiac artery stenosis ([facility]) 08/24/2017   • Intertrigo 08/25/2017   • COPD (chronic obstructive pulmonary disease) ([facility]) 06/30/2019   • Abnormal EKG 06/30/2019   • Muscle spasms of both lower extremities 12/22/2020   • Iron deficiency anemia 03/30/2021   • Adenomatous polyp of colon 03/30/2021   • Gastroesophageal reflux disease without esophagitis 03/30/2021   • Headache 07/04/2021   • History of endocarditis 02/03/2022     Resolved Ambulatory Problems     Diagnosis Date Noted   • Tachycardia    • Renal failure    • Palpitations    • Mitral regurgitation    • Heart failure ([facility]) 06/08/2016   • Long term current use of anticoagulant 10/13/2016   • Aspiration pneumonia ([facility]) 10/14/2016   • Hemoptysis 10/14/2016   • Lower GI bleed 08/09/2017   • Precordial pain 08/11/2017   • Oral candidiasis 08/14/2017   • VAN (acute kidney injury) ([facility]) 08/15/2017   • GI bleed 12/01/2017   • Acute exacerbation of chronic obstructive pulmonary disease (COPD) ([facility]) 01/02/2018   • Pneumonia due to infectious organism 07/18/2018   • Insomnia due to medical condition 02/01/2019   • Shingles 04/08/2020   • Vertigo 10/06/2020   • Dark stools 03/30/2021   • Acute hyperkalemia 05/10/2021   • Tremor 05/10/2021   • Anemia 05/10/2021   • COPD exacerbation ([facility]) 06/14/2021   • Septicemia  (HCC) 07/02/2021   • Bacteremia 07/03/2021     Past Medical History:   Diagnosis Date   • Asthma    • Atrial flutter (HCC)    • Cataract    • Colon polyp    • History of CHF (congestive heart failure)    • History of home oxygen therapy    • Infectious viral hepatitis    • Long term (current) use of anticoagulants    • Pneumonia          PAST SURGICAL HISTORY  Past Surgical History:   Procedure Laterality Date   • CARDIAC CATHETERIZATION  09/01/2014    Right dominant systemt, normal coronary arteries.    • CARDIAC CATHETERIZATION Left 6/10/2016    Procedure: Cardiac catheterization;  Surgeon: Sergei Hall MD;  Location: [facility] CATH INVASIVE LOCATION;  Service:    • CARDIAC CATHETERIZATION N/A 6/10/2016    Procedure: Right Heart Cath;  Surgeon: Sergei Hall MD;  Location: [facility] CATH INVASIVE LOCATION;  Service:    • CATARACT EXTRACTION     • COLONOSCOPY     • COLONOSCOPY N/A 8/4/2017    Procedure: COLONOSCOPY TO CECUM/TI WITH POLYPECTOMY ( COLD BX);  Surgeon: Cleveland Devine MD;  Location: [facility] ENDOSCOPY;  Service:    • COLONOSCOPY N/A 8/10/2017    Procedure: COLONOSCOPY to cecum and TI with 2 clips placed at transverse;  Surgeon: Earnest PALOMO MD;  Location: [facility] ENDOSCOPY;  Service:    • COLONOSCOPY N/A 12/22/2017    Procedure: COLONOSCOPY INTO CECUM WITH COLD POLYPECTOMIES;  Surgeon: Cleveland Devine MD;  Location: [facility] ENDOSCOPY;  Service:    • COLONOSCOPY N/A 5/17/2021    Procedure: COLONOSCOPY to cecum;  Surgeon: Cleveland Devine MD;  Location: [facility] ENDOSCOPY;  Service: Gastroenterology;  Laterality: N/A;  Pre: Fe deficency anemia, h/x of polyps  Post: fair prep, normal   • ENDOSCOPY N/A 8/17/2017    Procedure: ESOPHAGOGASTRODUODENOSCOPY;  Surgeon: Porsha Ruby MD;  Location: [facility] ENDOSCOPY;  Service:    • ENDOSCOPY N/A 12/22/2017    Procedure: ESOPHAGOGASTRODUODENOSCOPY WITH BIOPSIES;  Surgeon: Cleveland Devine MD;  Location: [facility] ENDOSCOPY;  Service:    • ENDOSCOPY N/A 5/17/2021    Procedure:  ESOPHAGOGASTRODUODENOSCOPY  with biopsies;  Surgeon: Cleveland Devine MD;  Location: [facility] ENDOSCOPY;  Service: Gastroenterology;  Laterality: N/A;  Pre: Fe deficency anemia, nausea, heme positive stool   Post: gastritis, sloughing of distal esophagus mucosa   • GALLBLADDER SURGERY     • HEMORRHOIDECTOMY     • HYSTERECTOMY     • KIDNEY SURGERY  04/22/2013    Stent placement   • MAZE PROCEDURE     • MITRAL VALVE REPLACEMENT     • TONSILLECTOMY     • TRICUSPID VALVE REPLACEMENT           FAMILY HISTORY  Family History   Adopted: Yes   Problem Relation Age of Onset   • No Known Problems Mother    • No Known Problems Father          SOCIAL HISTORY  Social History     Socioeconomic History   • Marital status:    • Number of children: 2   Tobacco Use   • Smoking status: Former Smoker     Packs/day: 3.00     Years: 54.00     Pack years: 162.00     Quit date: 2007     Years since quitting: 15.7   • Smokeless tobacco: Never Used   • Tobacco comment: caffeine - tea or mt dew    Vaping Use   • Vaping Use: Never used   Substance and Sexual Activity   • Alcohol use: No   • Drug use: No   • Sexual activity: Defer         ALLERGIES  Bupropion, Cephalexin, and Metoprolol        REVIEW OF SYSTEMS  Review of Systems     All systems reviewed and negative except for those discussed in HPI.       PHYSICAL EXAM    I have reviewed the triage vital signs and nursing notes.    ED Triage Vitals [09/18/22 1402]   Temp Heart Rate Resp BP SpO2   97.8 °F (36.6 °C) 79 20 (!) 190/88 94 %      Temp src Heart Rate Source Patient Position BP Location FiO2 (%)   -- -- -- -- --       Physical Exam    Physical Exam   Constitutional: No distress.  Chronically ill-appearing but not overtly toxic.  HENT:  Head: Normocephalic and atraumatic.   Oropharynx: Mucous membranes are moist.   Eyes: No scleral icterus. No conjunctival pallor.  Neck: Painless range of motion noted. Neck supple.   Cardiovascular: Normal rate, regular rhythm and intact distal  pulses.  Pulmonary/Chest: No respiratory distress.  Speaks in full sentences.  Significantly diminished throughout without crackles or wheezing appreciated.  Abdominal: Soft. There is no tenderness. There is no rebound and no guarding.   Musculoskeletal: Moves all extremities equally. There is no pedal edema or calf tenderness.   Neurological: Alert.  Baseline strength and sensation noted.   Skin: Skin is pink, warm, and dry. No pallor.   Psychiatric: Mood and affect normal.   Nursing note and vitals reviewed.    LAB RESULTS  Recent Results (from the past 24 hour(s))   ECG 12 Lead    Collection Time: 09/18/22  3:59 PM   Result Value Ref Range    QT Interval 433 ms   Respiratory Panel PCR w/COVID-19(SARS-CoV-2) KAJAL/MARILIN/DEBBIE/PAD/COR/MAD/DAVID In-House, NP Swab in UTM/VTM, 3-4 HR TAT - Swab, Nasopharynx    Collection Time: 09/18/22  4:04 PM    Specimen: Nasopharynx; Swab   Result Value Ref Range    ADENOVIRUS, PCR Not Detected Not Detected    Coronavirus 229E Not Detected Not Detected    Coronavirus HKU1 Not Detected Not Detected    Coronavirus NL63 Not Detected Not Detected    Coronavirus OC43 Not Detected Not Detected    COVID19 Not Detected Not Detected - Ref. Range    Human Metapneumovirus Not Detected Not Detected    Human Rhinovirus/Enterovirus Not Detected Not Detected    Influenza A PCR Not Detected Not Detected    Influenza B PCR Not Detected Not Detected    Parainfluenza Virus 1 Not Detected Not Detected    Parainfluenza Virus 2 Not Detected Not Detected    Parainfluenza Virus 3 Not Detected Not Detected    Parainfluenza Virus 4 Not Detected Not Detected    RSV, PCR Not Detected Not Detected    Bordetella pertussis pcr Not Detected Not Detected    Bordetella parapertussis PCR Not Detected Not Detected    Chlamydophila pneumoniae PCR Not Detected Not Detected    Mycoplasma pneumo by PCR Not Detected Not Detected   Comprehensive Metabolic Panel    Collection Time: 09/18/22  4:11 PM    Specimen: Blood   Result  Value Ref Range    Glucose 113 (H) 65 - 99 mg/dL    BUN 13 8 - 23 mg/dL    Creatinine 0.81 0.57 - 1.00 mg/dL    Sodium 144 136 - 145 mmol/L    Potassium 2.9 (L) 3.5 - 5.2 mmol/L    Chloride 99 98 - 107 mmol/L    CO2 31.2 (H) 22.0 - 29.0 mmol/L    Calcium 9.9 8.6 - 10.5 mg/dL    Total Protein 7.0 6.0 - 8.5 g/dL    Albumin 4.90 3.50 - 5.20 g/dL    ALT (SGPT) 16 1 - 33 U/L    AST (SGOT) 21 1 - 32 U/L    Alkaline Phosphatase 102 39 - 117 U/L    Total Bilirubin 0.6 0.0 - 1.2 mg/dL    Globulin 2.1 gm/dL    A/G Ratio 2.3 g/dL    BUN/Creatinine Ratio 16.0 7.0 - 25.0    Anion Gap 13.8 5.0 - 15.0 mmol/L    eGFR 78.7 >60.0 mL/min/1.73   Lactic Acid, Plasma    Collection Time: 09/18/22  4:11 PM    Specimen: Blood   Result Value Ref Range    Lactate 1.2 0.5 - 2.0 mmol/L   Troponin    Collection Time: 09/18/22  4:11 PM    Specimen: Blood   Result Value Ref Range    Troponin T <0.010 0.000 - 0.030 ng/mL   BNP    Collection Time: 09/18/22  4:11 PM    Specimen: Blood   Result Value Ref Range    proBNP 467.0 0.0 - 900.0 pg/mL   CBC Auto Differential    Collection Time: 09/18/22  4:11 PM    Specimen: Blood   Result Value Ref Range    WBC 8.38 3.40 - 10.80 10*3/mm3    RBC 4.73 3.77 - 5.28 10*6/mm3    Hemoglobin 13.3 12.0 - 15.9 g/dL    Hematocrit 41.0 34.0 - 46.6 %    MCV 86.7 79.0 - 97.0 fL    MCH 28.1 26.6 - 33.0 pg    MCHC 32.4 31.5 - 35.7 g/dL    RDW 12.7 12.3 - 15.4 %    RDW-SD 40.7 37.0 - 54.0 fl    MPV 10.0 6.0 - 12.0 fL    Platelets 257 140 - 450 10*3/mm3    Neutrophil % 67.9 42.7 - 76.0 %    Lymphocyte % 25.2 19.6 - 45.3 %    Monocyte % 4.2 (L) 5.0 - 12.0 %    Eosinophil % 1.8 0.3 - 6.2 %    Basophil % 0.5 0.0 - 1.5 %    Immature Grans % 0.4 0.0 - 0.5 %    Neutrophils, Absolute 5.70 1.70 - 7.00 10*3/mm3    Lymphocytes, Absolute 2.11 0.70 - 3.10 10*3/mm3    Monocytes, Absolute 0.35 0.10 - 0.90 10*3/mm3    Eosinophils, Absolute 0.15 0.00 - 0.40 10*3/mm3    Basophils, Absolute 0.04 0.00 - 0.20 10*3/mm3    Immature Grans, Absolute  0.03 0.00 - 0.05 10*3/mm3    nRBC 0.0 0.0 - 0.2 /100 WBC       Ordered the above labs and independently reviewed the results.        RADIOLOGY  XR Chest 1 View    Result Date: 9/18/2022  ONE VIEW PORTABLE CHEST  HISTORY: Shortness of breath.  FINDINGS: There is COPD with hyperinflation and there are some calcified granulomas scattered in both upper lobes and some minimal scattered scarring similar to the study of 07/02/2021. There is mild cardiomegaly with sternal wires from previous cardiac surgery and no acute abnormality is seen.  This report was finalized on 9/18/2022 4:15 PM by Dr. Isaías Garza M.D.        I ordered the above noted radiological studies. Reviewed by me and discussed with radiologist.  See dictation for official radiology interpretation.      PROCEDURES    Procedures        MEDICATIONS GIVEN IN ER    Medications   sodium chloride 0.9 % flush 10 mL (has no administration in time range)   predniSONE (DELTASONE) tablet 60 mg (has no administration in time range)   azithromycin (ZITHROMAX) tablet 500 mg (has no administration in time range)   methylPREDNISolone sodium succinate (SOLU-Medrol) injection 125 mg (125 mg Intravenous Given 9/18/22 1629)   ipratropium-albuterol (DUO-NEB) nebulizer solution 3 mL (3 mL Nebulization Given 9/18/22 1653)   potassium chloride (K-DUR,KLOR-CON) ER tablet 40 mEq (40 mEq Oral Given 9/18/22 1745)         PROGRESS, DATA ANALYSIS, CONSULTS, AND MEDICAL DECISION MAKING    My differential diagnosis for cough includes but is not limited to:  Upper respiratory infection, bronchitis, pneumonia, COPD exacerbation, cough variant asthma, cardiac asthma, coronary artery disease, congestive heart failure, bacterial, viral or lung infections, lung cancer, aspiration pneumonitis, aspiration of foreign body and Covid -19      My differential diagnosis for dyspnea includes but is not limited to:  Asthma, COPD, pneumonia, pulmonary embolus, acute respiratory distress syndrome,  pneumothorax, pleural effusion, pulmonary fibrosis, congestive heart failure, myocardial infarction, DKA, uremia, acidosis, sepsis, anemia, drug related, hyperventilation, CNS disease      All labs have been independently reviewed by me.  All radiology studies have been reviewed by me and discussed with radiologist dictating the report.   EKG's independently viewed and interpreted by me.  Discussion below represents my analysis of pertinent findings related to patient's condition, differential diagnosis, treatment plan and final disposition.      ED Course as of 09/18/22 1747   Sun Sep 18, 2022   1612 EKG           EKG time: 1559  Rhythm/Rate: Rate controlled A. fib, ED with occasional PVC  P waves and NE: N/A  QRS, axis: Narrow complex  ST and T waves: No STEMI; QTC within normal limits    Interpreted Contemporaneously by me, independently viewed  Comparison: 2/3/2022-no acute change   [RS]   1620 Patient with history of COPD and cardiac disease presents with increased cough and dyspnea.  Neb treatment and steroids while obtaining laboratory and radiologic evaluation.  Patient agreeable. [RS]   1731 Troponin T: <0.010 [RS]   1731 proBNP: 467.0 [RS]   1731 Lactate: 1.2 [RS]   1731 WBC: 8.38 [RS]   1731 Hemoglobin: 13.3 [RS]   1731 BUN: 13 [RS]   1731 Creatinine: 0.81 [RS]   1731 Sodium: 144 [RS]   1731 Potassium(!): 2.9  Oral replacement ordered [RS]   1745 Patient feels somewhat improved after steroids and neb treatment.  Patient eager for discharge.  Plan Z-Antonio for productive cough and steroid taper. [RS]      ED Course User Index  [RS] Sergei Sloan MD       AS OF 17:47 EDT VITALS:    BP - (!) 191/87  HR - 74  TEMP - 97.8 °F (36.6 °C)  O2 SATS - 100%        DIAGNOSIS  Final diagnoses:   COPD with acute exacerbation (HCC)   Cough   Hypokalemia         DISPOSITION  DISCHARGE    Patient discharged in stable condition.    Reviewed implications of results, diagnosis, meds, responsibility to follow up, warning signs  and symptoms of possible worsening, potential complications and reasons to return to ER.    Patient/Family voiced understanding of above instructions.    Discussed plan for discharge, as there is no emergent indication for admission. Patient referred to primary care provider for regular health maintenance. Pt/family is agreeable and understands need for follow up and possible repeat testing.  Pt is aware that discharge does not mean that nothing is wrong but it indicates no emergency is present that requires admission and they must continue care with follow-up as given below or physician of their choice.     FOLLOW-UP  Javan Martinez MD  [address] [facility] [address] [phone]    Schedule an appointment as soon as possible for a visit in 3 days           Medication List      New Prescriptions    azithromycin 250 MG tablet  Commonly known as: ZITHROMAX  Take 1 tablet by mouth daily for 4 days.     predniSONE 20 MG tablet  Commonly known as: DELTASONE  2 tablets by mouth daily for 5 days        Changed    polyethylene glycol packet  Commonly known as: MIRALAX  Take 17 g by mouth 2 (Two) Times a Day.  What changed:   · when to take this  · reasons to take this     * potassium chloride 10 MEQ CR tablet  Take 1 tablet by mouth Daily.  What changed: Another medication with the same name was added. Make sure you understand how and when to take each.     * potassium chloride 10 MEQ CR tablet  Take 1 tablet by mouth Daily.  What changed: You were already taking a medication with the same name, and this prescription was added. Make sure you understand how and when to take each.         * This list has 2 medication(s) that are the same as other medications prescribed for you. Read the directions carefully, and ask your doctor or other care provider to review them with you.               Where to Get Your Medications      These medications were sent to [facility]/pharmacy #[id number] [place], [place] - [address] AT  [facility] - [phone] [facility] [phone] FX  [address] [place] [address]    Hours: 24-hours Phone: [phone]   · azithromycin 250 MG tablet  · potassium chloride 10 MEQ CR tablet  · predniSONE 20 MG tablet            Sergei Sloan MD  09/18/22 6970

## 2022-09-18 NOTE — ED NOTES
Pt to ED via PV. Pt reports increased SOA x couple of days. Pt reporting chest congestion. Pt hx COPD and emphysema. Pt reports she is always on 2L via NC. Alert and oriented x4.

## 2022-09-19 RX ORDER — FUROSEMIDE 40 MG/1
TABLET ORAL
Qty: 60 TABLET | Refills: 0 | Status: SHIPPED | OUTPATIENT
Start: 2022-09-19 | End: 2022-10-04 | Stop reason: SDUPTHER

## 2022-09-20 ENCOUNTER — PATIENT OUTREACH (OUTPATIENT)
Dept: CASE MANAGEMENT | Facility: OTHER | Age: 67
End: 2022-09-20

## 2022-09-20 NOTE — OUTREACH NOTE
AMBULATORY CASE MANAGEMENT NOTE    Name and Relationship of Patient/Support Person: PavanChapincito - Emergency Contact    Patient Outreach    Outgoing call to the patient and spoke with her spouse, Chapincito.  He states that they have moved and he is using the cell phone they share.  Discussed recent ED visit.  He states that she is improved and at home resting.  Asked that she reach out for any needs.  Discussed HRCM program and he declines on her behalf.  He states that they do not need assist at this time and have no needs.    PILAR CHOU  Ambulatory Case Management    9/20/2022, 16:18 EDT

## 2022-09-21 DIAGNOSIS — Z79.891 LONG TERM CURRENT USE OF OPIATE ANALGESIC: ICD-10-CM

## 2022-09-21 RX ORDER — HYDROCODONE BITARTRATE AND ACETAMINOPHEN 5; 325 MG/1; MG/1
1 TABLET ORAL EVERY 8 HOURS PRN
Qty: 90 TABLET | Refills: 0 | Status: SHIPPED | OUTPATIENT
Start: 2022-09-21 | End: 2022-10-20 | Stop reason: SDUPTHER

## 2022-09-21 NOTE — TELEPHONE ENCOUNTER
Caller: Chapincito Browne    Relationship: Emergency Contact    Best call back number:[phone]       Requested Prescriptions:   Requested Prescriptions     Pending Prescriptions Disp Refills   • HYDROcodone-acetaminophen (NORCO) 5-325 MG per tablet 90 tablet 0     Sig: Take 1 tablet by mouth Every 8 (Eight) Hours As Needed for Moderate Pain. Chronic pain medicine for low back pain        Pharmacy where request should be sent: [facility]/PHARMACY #[id number] - [place], [place] - [address] [place] [address] DRIVE AT [facility] - [phone]  - [phone] FX     Additional details provided by patient: PATIENT HAS ABOUT A 1-2 DAY SUPPLY LEFT OF MEDICATION     Does the patient have less than a 3 day supply:  [x] Yes  [] No    Harrison Pagan Rep   09/21/22 08:12 EDT

## 2022-09-27 RX ORDER — ZALEPLON 10 MG/1
10 CAPSULE ORAL NIGHTLY
Qty: 30 CAPSULE | Refills: 3 | Status: SHIPPED | OUTPATIENT
Start: 2022-09-27 | End: 2022-12-26 | Stop reason: SDUPTHER

## 2022-10-04 RX ORDER — FUROSEMIDE 40 MG/1
40 TABLET ORAL 2 TIMES DAILY
Qty: 60 TABLET | Refills: 3 | Status: SHIPPED | OUTPATIENT
Start: 2022-10-04 | End: 2022-11-14

## 2022-10-04 RX ORDER — PREDNISONE 20 MG/1
TABLET ORAL
Qty: 10 TABLET | Refills: 0 | Status: SHIPPED | OUTPATIENT
Start: 2022-10-04 | End: 2022-10-19 | Stop reason: HOSPADM

## 2022-10-04 NOTE — TELEPHONE ENCOUNTER
Caller: Paz Browne    Relationship: Self    Best call back number: [phone] (H)    Requested Prescriptions:   Requested Prescriptions     Pending Prescriptions Disp Refills   • predniSONE (DELTASONE) 20 MG tablet 10 tablet 0     Si tablets by mouth daily for 5 days   • furosemide (LASIX) 40 MG tablet 60 tablet 0     Sig: Take 1 tablet by mouth 2 (Two) Times a Day.        Pharmacy where request should be sent: [facility]/PHARMACY #[id number] [address] [phone] [facility] [phone] FX     Additional details provided by patient: PATIENT IS OUT OF MEDICATION NEEDS IT SOON AS POSSIBLE    Does the patient have less than a 3 day supply:  [x] Yes  [] No    Harrison Cheatham Rep   10/04/22 10:22 EDT

## 2022-10-16 ENCOUNTER — APPOINTMENT (OUTPATIENT)
Dept: GENERAL RADIOLOGY | Facility: HOSPITAL | Age: 67
End: 2022-10-16

## 2022-10-16 ENCOUNTER — HOSPITAL ENCOUNTER (INPATIENT)
Facility: HOSPITAL | Age: 67
LOS: 3 days | Discharge: HOME | End: 2022-10-19
Attending: EMERGENCY MEDICINE | Admitting: INTERNAL MEDICINE

## 2022-10-16 DIAGNOSIS — A41.9 SEPSIS WITHOUT ACUTE ORGAN DYSFUNCTION, DUE TO UNSPECIFIED ORGANISM: ICD-10-CM

## 2022-10-16 DIAGNOSIS — J44.9 CHRONIC OBSTRUCTIVE PULMONARY DISEASE, UNSPECIFIED COPD TYPE: ICD-10-CM

## 2022-10-16 DIAGNOSIS — Z86.79 HISTORY OF ENDOCARDITIS: ICD-10-CM

## 2022-10-16 DIAGNOSIS — D72.829 LEUKOCYTOSIS, UNSPECIFIED TYPE: ICD-10-CM

## 2022-10-16 DIAGNOSIS — J18.9 PNEUMONIA OF BOTH LOWER LOBES DUE TO INFECTIOUS ORGANISM: Primary | ICD-10-CM

## 2022-10-16 DIAGNOSIS — J96.11 CHRONIC RESPIRATORY FAILURE WITH HYPOXIA: ICD-10-CM

## 2022-10-16 DIAGNOSIS — J44.1 COPD WITH ACUTE EXACERBATION: ICD-10-CM

## 2022-10-16 LAB
ALBUMIN SERPL-MCNC: 4.4 G/DL (ref 3.5–5.2)
ALBUMIN/GLOB SERPL: 1.6 G/DL
ALP SERPL-CCNC: 99 U/L (ref 39–117)
ALT SERPL W P-5'-P-CCNC: 12 U/L (ref 1–33)
ANION GAP SERPL CALCULATED.3IONS-SCNC: 12.5 MMOL/L (ref 5–15)
AST SERPL-CCNC: 17 U/L (ref 1–32)
B PARAPERT DNA SPEC QL NAA+PROBE: NOT DETECTED
B PERT DNA SPEC QL NAA+PROBE: NOT DETECTED
BASOPHILS # BLD AUTO: 0.03 10*3/MM3 (ref 0–0.2)
BASOPHILS NFR BLD AUTO: 0.2 % (ref 0–1.5)
BILIRUB SERPL-MCNC: 1.1 MG/DL (ref 0–1.2)
BILIRUB UR QL STRIP: NEGATIVE
BUN SERPL-MCNC: 10 MG/DL (ref 8–23)
BUN/CREAT SERPL: 12.2 (ref 7–25)
C PNEUM DNA NPH QL NAA+NON-PROBE: NOT DETECTED
CALCIUM SPEC-SCNC: 9.6 MG/DL (ref 8.6–10.5)
CHLORIDE SERPL-SCNC: 97 MMOL/L (ref 98–107)
CLARITY UR: CLEAR
CO2 SERPL-SCNC: 29.5 MMOL/L (ref 22–29)
COLOR UR: YELLOW
CREAT SERPL-MCNC: 0.82 MG/DL (ref 0.57–1)
D-LACTATE SERPL-SCNC: 0.9 MMOL/L (ref 0.5–2)
DEPRECATED RDW RBC AUTO: 38.1 FL (ref 37–54)
EGFRCR SERPLBLD CKD-EPI 2021: 77.5 ML/MIN/1.73
EOSINOPHIL # BLD AUTO: 0.08 10*3/MM3 (ref 0–0.4)
EOSINOPHIL NFR BLD AUTO: 0.5 % (ref 0.3–6.2)
ERYTHROCYTE [DISTWIDTH] IN BLOOD BY AUTOMATED COUNT: 12.6 % (ref 12.3–15.4)
FLUAV SUBTYP SPEC NAA+PROBE: NOT DETECTED
FLUBV RNA ISLT QL NAA+PROBE: NOT DETECTED
GLOBULIN UR ELPH-MCNC: 2.8 GM/DL
GLUCOSE SERPL-MCNC: 120 MG/DL (ref 65–99)
GLUCOSE UR STRIP-MCNC: NEGATIVE MG/DL
HADV DNA SPEC NAA+PROBE: NOT DETECTED
HCOV 229E RNA SPEC QL NAA+PROBE: NOT DETECTED
HCOV HKU1 RNA SPEC QL NAA+PROBE: NOT DETECTED
HCOV NL63 RNA SPEC QL NAA+PROBE: NOT DETECTED
HCOV OC43 RNA SPEC QL NAA+PROBE: NOT DETECTED
HCT VFR BLD AUTO: 35.6 % (ref 34–46.6)
HGB BLD-MCNC: 12.2 G/DL (ref 12–15.9)
HGB UR QL STRIP.AUTO: NEGATIVE
HMPV RNA NPH QL NAA+NON-PROBE: NOT DETECTED
HOLD SPECIMEN: NORMAL
HOLD SPECIMEN: NORMAL
HPIV1 RNA ISLT QL NAA+PROBE: NOT DETECTED
HPIV2 RNA SPEC QL NAA+PROBE: NOT DETECTED
HPIV3 RNA NPH QL NAA+PROBE: NOT DETECTED
HPIV4 P GENE NPH QL NAA+PROBE: NOT DETECTED
IMM GRANULOCYTES # BLD AUTO: 0.1 10*3/MM3 (ref 0–0.05)
IMM GRANULOCYTES NFR BLD AUTO: 0.6 % (ref 0–0.5)
KETONES UR QL STRIP: NEGATIVE
LEUKOCYTE ESTERASE UR QL STRIP.AUTO: NEGATIVE
LYMPHOCYTES # BLD AUTO: 1.37 10*3/MM3 (ref 0.7–3.1)
LYMPHOCYTES NFR BLD AUTO: 7.7 % (ref 19.6–45.3)
M PNEUMO IGG SER IA-ACNC: NOT DETECTED
MAGNESIUM SERPL-MCNC: 1.7 MG/DL (ref 1.6–2.4)
MCH RBC QN AUTO: 28.5 PG (ref 26.6–33)
MCHC RBC AUTO-ENTMCNC: 34.3 G/DL (ref 31.5–35.7)
MCV RBC AUTO: 83.2 FL (ref 79–97)
MONOCYTES # BLD AUTO: 0.72 10*3/MM3 (ref 0.1–0.9)
MONOCYTES NFR BLD AUTO: 4.1 % (ref 5–12)
NEUTROPHILS NFR BLD AUTO: 15.45 10*3/MM3 (ref 1.7–7)
NEUTROPHILS NFR BLD AUTO: 86.9 % (ref 42.7–76)
NITRITE UR QL STRIP: NEGATIVE
NRBC BLD AUTO-RTO: 0 /100 WBC (ref 0–0.2)
PH UR STRIP.AUTO: 7 [PH] (ref 5–8)
PLATELET # BLD AUTO: 268 10*3/MM3 (ref 140–450)
PMV BLD AUTO: 10.6 FL (ref 6–12)
POTASSIUM SERPL-SCNC: 3 MMOL/L (ref 3.5–5.2)
PROCALCITONIN SERPL-MCNC: 0.06 NG/ML (ref 0–0.25)
PROT SERPL-MCNC: 7.2 G/DL (ref 6–8.5)
PROT UR QL STRIP: ABNORMAL
QT INTERVAL: 351 MS
RBC # BLD AUTO: 4.28 10*6/MM3 (ref 3.77–5.28)
RHINOVIRUS RNA SPEC NAA+PROBE: NOT DETECTED
RSV RNA NPH QL NAA+NON-PROBE: NOT DETECTED
SARS-COV-2 RNA NPH QL NAA+NON-PROBE: NOT DETECTED
SODIUM SERPL-SCNC: 139 MMOL/L (ref 136–145)
SP GR UR STRIP: 1.01 (ref 1–1.03)
UROBILINOGEN UR QL STRIP: ABNORMAL
WBC NRBC COR # BLD: 17.75 10*3/MM3 (ref 3.4–10.8)
WHOLE BLOOD HOLD COAG: NORMAL
WHOLE BLOOD HOLD SPECIMEN: NORMAL

## 2022-10-16 PROCEDURE — 93010 ELECTROCARDIOGRAM REPORT: CPT | Performed by: INTERNAL MEDICINE

## 2022-10-16 PROCEDURE — 84145 PROCALCITONIN (PCT): CPT | Performed by: EMERGENCY MEDICINE

## 2022-10-16 PROCEDURE — P9612 CATHETERIZE FOR URINE SPEC: HCPCS

## 2022-10-16 PROCEDURE — 85025 COMPLETE CBC W/AUTO DIFF WBC: CPT | Performed by: EMERGENCY MEDICINE

## 2022-10-16 PROCEDURE — 71045 X-RAY EXAM CHEST 1 VIEW: CPT

## 2022-10-16 PROCEDURE — 87040 BLOOD CULTURE FOR BACTERIA: CPT | Performed by: EMERGENCY MEDICINE

## 2022-10-16 PROCEDURE — 93005 ELECTROCARDIOGRAM TRACING: CPT

## 2022-10-16 PROCEDURE — 25010000002 CEFTRIAXONE PER 250 MG: Performed by: EMERGENCY MEDICINE

## 2022-10-16 PROCEDURE — 99285 EMERGENCY DEPT VISIT HI MDM: CPT

## 2022-10-16 PROCEDURE — 81001 URINALYSIS AUTO W/SCOPE: CPT | Performed by: EMERGENCY MEDICINE

## 2022-10-16 PROCEDURE — 80053 COMPREHEN METABOLIC PANEL: CPT | Performed by: EMERGENCY MEDICINE

## 2022-10-16 PROCEDURE — 83735 ASSAY OF MAGNESIUM: CPT | Performed by: EMERGENCY MEDICINE

## 2022-10-16 PROCEDURE — 83605 ASSAY OF LACTIC ACID: CPT | Performed by: EMERGENCY MEDICINE

## 2022-10-16 PROCEDURE — 93005 ELECTROCARDIOGRAM TRACING: CPT | Performed by: EMERGENCY MEDICINE

## 2022-10-16 PROCEDURE — 0202U NFCT DS 22 TRGT SARS-COV-2: CPT | Performed by: EMERGENCY MEDICINE

## 2022-10-16 RX ORDER — POTASSIUM CHLORIDE 750 MG/1
40 TABLET, FILM COATED, EXTENDED RELEASE ORAL ONCE
Status: COMPLETED | OUTPATIENT
Start: 2022-10-16 | End: 2022-10-16

## 2022-10-16 RX ORDER — ACETAMINOPHEN 500 MG
1000 TABLET ORAL ONCE
Status: COMPLETED | OUTPATIENT
Start: 2022-10-16 | End: 2022-10-16

## 2022-10-16 RX ADMIN — POTASSIUM CHLORIDE 40 MEQ: 750 TABLET, EXTENDED RELEASE ORAL at 22:51

## 2022-10-16 RX ADMIN — ACETAMINOPHEN 1000 MG: 500 TABLET ORAL at 21:02

## 2022-10-16 RX ADMIN — CEFTRIAXONE SODIUM 1 G: 1 INJECTION, POWDER, FOR SOLUTION INTRAMUSCULAR; INTRAVENOUS at 21:40

## 2022-10-17 ENCOUNTER — APPOINTMENT (OUTPATIENT)
Dept: CT IMAGING | Facility: HOSPITAL | Age: 67
End: 2022-10-17

## 2022-10-17 PROBLEM — K27.9 PEPTIC ULCER DISEASE: Status: ACTIVE | Noted: 2022-10-17

## 2022-10-17 PROBLEM — I73.9 PERIPHERAL VASCULAR DISEASE (HCC): Status: ACTIVE | Noted: 2022-10-17

## 2022-10-17 PROBLEM — R73.9 HYPERGLYCEMIA: Status: ACTIVE | Noted: 2022-10-17

## 2022-10-17 PROBLEM — E78.5 HYPERLIPIDEMIA: Status: ACTIVE | Noted: 2022-10-17

## 2022-10-17 PROBLEM — J44.9 COPD (CHRONIC OBSTRUCTIVE PULMONARY DISEASE) (HCC): Status: ACTIVE | Noted: 2022-10-17

## 2022-10-17 PROBLEM — E87.6 HYPOKALEMIA: Status: ACTIVE | Noted: 2022-10-17

## 2022-10-17 PROBLEM — R91.1 PULMONARY NODULE: Status: ACTIVE | Noted: 2022-10-17

## 2022-10-17 PROBLEM — J96.01 ACUTE HYPOXEMIC RESPIRATORY FAILURE: Status: ACTIVE | Noted: 2022-10-17

## 2022-10-17 LAB
ANION GAP SERPL CALCULATED.3IONS-SCNC: 12.9 MMOL/L (ref 5–15)
BACTERIA UR QL AUTO: NORMAL /HPF
BASOPHILS # BLD AUTO: 0.03 10*3/MM3 (ref 0–0.2)
BASOPHILS NFR BLD AUTO: 0.2 % (ref 0–1.5)
BUN SERPL-MCNC: 9 MG/DL (ref 8–23)
BUN/CREAT SERPL: 12.3 (ref 7–25)
CALCIUM SPEC-SCNC: 9 MG/DL (ref 8.6–10.5)
CHLORIDE SERPL-SCNC: 102 MMOL/L (ref 98–107)
CO2 SERPL-SCNC: 26.1 MMOL/L (ref 22–29)
CREAT SERPL-MCNC: 0.73 MG/DL (ref 0.57–1)
DEPRECATED RDW RBC AUTO: 39.5 FL (ref 37–54)
EGFRCR SERPLBLD CKD-EPI 2021: 89.2 ML/MIN/1.73
EOSINOPHIL # BLD AUTO: 0.1 10*3/MM3 (ref 0–0.4)
EOSINOPHIL NFR BLD AUTO: 0.8 % (ref 0.3–6.2)
ERYTHROCYTE [DISTWIDTH] IN BLOOD BY AUTOMATED COUNT: 12.8 % (ref 12.3–15.4)
GLUCOSE SERPL-MCNC: 107 MG/DL (ref 65–99)
HBA1C MFR BLD: 4.8 % (ref 4.8–5.6)
HCT VFR BLD AUTO: 30.4 % (ref 34–46.6)
HGB BLD-MCNC: 10.1 G/DL (ref 12–15.9)
HYALINE CASTS UR QL AUTO: NORMAL /LPF
IMM GRANULOCYTES # BLD AUTO: 0.06 10*3/MM3 (ref 0–0.05)
IMM GRANULOCYTES NFR BLD AUTO: 0.5 % (ref 0–0.5)
L PNEUMO1 AG UR QL IA: NEGATIVE
LYMPHOCYTES # BLD AUTO: 1.67 10*3/MM3 (ref 0.7–3.1)
LYMPHOCYTES NFR BLD AUTO: 13.9 % (ref 19.6–45.3)
MAGNESIUM SERPL-MCNC: 2.1 MG/DL (ref 1.6–2.4)
MCH RBC QN AUTO: 28.5 PG (ref 26.6–33)
MCHC RBC AUTO-ENTMCNC: 33.2 G/DL (ref 31.5–35.7)
MCV RBC AUTO: 85.6 FL (ref 79–97)
MONOCYTES # BLD AUTO: 0.69 10*3/MM3 (ref 0.1–0.9)
MONOCYTES NFR BLD AUTO: 5.7 % (ref 5–12)
MRSA DNA SPEC QL NAA+PROBE: NORMAL
NEUTROPHILS NFR BLD AUTO: 78.9 % (ref 42.7–76)
NEUTROPHILS NFR BLD AUTO: 9.46 10*3/MM3 (ref 1.7–7)
NRBC BLD AUTO-RTO: 0 /100 WBC (ref 0–0.2)
NT-PROBNP SERPL-MCNC: 941 PG/ML (ref 0–900)
PLATELET # BLD AUTO: 199 10*3/MM3 (ref 140–450)
PMV BLD AUTO: 10.7 FL (ref 6–12)
POTASSIUM SERPL-SCNC: 3.2 MMOL/L (ref 3.5–5.2)
POTASSIUM SERPL-SCNC: 4.1 MMOL/L (ref 3.5–5.2)
RBC # BLD AUTO: 3.55 10*6/MM3 (ref 3.77–5.28)
RBC # UR STRIP: NORMAL /HPF
REF LAB TEST METHOD: NORMAL
S PNEUM AG SPEC QL LA: NEGATIVE
SODIUM SERPL-SCNC: 141 MMOL/L (ref 136–145)
SQUAMOUS #/AREA URNS HPF: NORMAL /HPF
TROPONIN T SERPL-MCNC: <0.01 NG/ML (ref 0–0.03)
WBC # UR STRIP: NORMAL /HPF
WBC NRBC COR # BLD: 12.01 10*3/MM3 (ref 3.4–10.8)

## 2022-10-17 PROCEDURE — 83036 HEMOGLOBIN GLYCOSYLATED A1C: CPT | Performed by: INTERNAL MEDICINE

## 2022-10-17 PROCEDURE — 84132 ASSAY OF SERUM POTASSIUM: CPT | Performed by: INTERNAL MEDICINE

## 2022-10-17 PROCEDURE — 87449 NOS EACH ORGANISM AG IA: CPT | Performed by: NURSE PRACTITIONER

## 2022-10-17 PROCEDURE — 84484 ASSAY OF TROPONIN QUANT: CPT | Performed by: INTERNAL MEDICINE

## 2022-10-17 PROCEDURE — 94761 N-INVAS EAR/PLS OXIMETRY MLT: CPT

## 2022-10-17 PROCEDURE — 25010000002 CEFTRIAXONE PER 250 MG: Performed by: INTERNAL MEDICINE

## 2022-10-17 PROCEDURE — 87040 BLOOD CULTURE FOR BACTERIA: CPT | Performed by: NURSE PRACTITIONER

## 2022-10-17 PROCEDURE — 25010000002 AZITHROMYCIN PER 500 MG: Performed by: INTERNAL MEDICINE

## 2022-10-17 PROCEDURE — 83880 ASSAY OF NATRIURETIC PEPTIDE: CPT | Performed by: INTERNAL MEDICINE

## 2022-10-17 PROCEDURE — 25010000002 IOPAMIDOL 61 % SOLUTION: Performed by: INTERNAL MEDICINE

## 2022-10-17 PROCEDURE — 25010000002 ENOXAPARIN PER 10 MG: Performed by: INTERNAL MEDICINE

## 2022-10-17 PROCEDURE — 87899 AGENT NOS ASSAY W/OPTIC: CPT | Performed by: NURSE PRACTITIONER

## 2022-10-17 PROCEDURE — 94640 AIRWAY INHALATION TREATMENT: CPT

## 2022-10-17 PROCEDURE — 80048 BASIC METABOLIC PNL TOTAL CA: CPT | Performed by: INTERNAL MEDICINE

## 2022-10-17 PROCEDURE — 87641 MR-STAPH DNA AMP PROBE: CPT | Performed by: INTERNAL MEDICINE

## 2022-10-17 PROCEDURE — 83735 ASSAY OF MAGNESIUM: CPT | Performed by: INTERNAL MEDICINE

## 2022-10-17 PROCEDURE — 25010000002 CEFTRIAXONE PER 250 MG: Performed by: NURSE PRACTITIONER

## 2022-10-17 PROCEDURE — 94799 UNLISTED PULMONARY SVC/PX: CPT

## 2022-10-17 PROCEDURE — 87205 SMEAR GRAM STAIN: CPT | Performed by: NURSE PRACTITIONER

## 2022-10-17 PROCEDURE — 94664 DEMO&/EVAL PT USE INHALER: CPT

## 2022-10-17 PROCEDURE — 87070 CULTURE OTHR SPECIMN AEROBIC: CPT | Performed by: NURSE PRACTITIONER

## 2022-10-17 PROCEDURE — 71260 CT THORAX DX C+: CPT

## 2022-10-17 PROCEDURE — 85025 COMPLETE CBC W/AUTO DIFF WBC: CPT | Performed by: INTERNAL MEDICINE

## 2022-10-17 RX ORDER — ALBUTEROL SULFATE 2.5 MG/3ML
2.5 SOLUTION RESPIRATORY (INHALATION) EVERY 6 HOURS PRN
Status: DISCONTINUED | OUTPATIENT
Start: 2022-10-17 | End: 2022-10-19 | Stop reason: HOSPADM

## 2022-10-17 RX ORDER — IPRATROPIUM BROMIDE AND ALBUTEROL SULFATE 2.5; .5 MG/3ML; MG/3ML
3 SOLUTION RESPIRATORY (INHALATION) EVERY 4 HOURS PRN
Status: DISCONTINUED | OUTPATIENT
Start: 2022-10-17 | End: 2022-10-19 | Stop reason: HOSPADM

## 2022-10-17 RX ORDER — CARVEDILOL 6.25 MG/1
6.25 TABLET ORAL 2 TIMES DAILY WITH MEALS
Status: DISCONTINUED | OUTPATIENT
Start: 2022-10-17 | End: 2022-10-19 | Stop reason: HOSPADM

## 2022-10-17 RX ORDER — MAGNESIUM SULFATE HEPTAHYDRATE 40 MG/ML
2 INJECTION, SOLUTION INTRAVENOUS AS NEEDED
Status: DISCONTINUED | OUTPATIENT
Start: 2022-10-17 | End: 2022-10-19 | Stop reason: HOSPADM

## 2022-10-17 RX ORDER — POTASSIUM CHLORIDE 7.45 MG/ML
10 INJECTION INTRAVENOUS
Status: DISCONTINUED | OUTPATIENT
Start: 2022-10-17 | End: 2022-10-19 | Stop reason: HOSPADM

## 2022-10-17 RX ORDER — ONDANSETRON 2 MG/ML
4 INJECTION INTRAMUSCULAR; INTRAVENOUS EVERY 6 HOURS PRN
Status: DISCONTINUED | OUTPATIENT
Start: 2022-10-17 | End: 2022-10-19 | Stop reason: HOSPADM

## 2022-10-17 RX ORDER — SERTRALINE HYDROCHLORIDE 100 MG/1
100 TABLET, FILM COATED ORAL DAILY
Status: DISCONTINUED | OUTPATIENT
Start: 2022-10-17 | End: 2022-10-19 | Stop reason: HOSPADM

## 2022-10-17 RX ORDER — AMLODIPINE BESYLATE 10 MG/1
10 TABLET ORAL DAILY
Status: DISCONTINUED | OUTPATIENT
Start: 2022-10-17 | End: 2022-10-19 | Stop reason: HOSPADM

## 2022-10-17 RX ORDER — ROFLUMILAST 500 UG/1
500 TABLET ORAL DAILY
Status: DISCONTINUED | OUTPATIENT
Start: 2022-10-17 | End: 2022-10-19 | Stop reason: HOSPADM

## 2022-10-17 RX ORDER — SODIUM CHLORIDE 0.9 % (FLUSH) 0.9 %
10 SYRINGE (ML) INJECTION EVERY 12 HOURS SCHEDULED
Status: DISCONTINUED | OUTPATIENT
Start: 2022-10-17 | End: 2022-10-19 | Stop reason: HOSPADM

## 2022-10-17 RX ORDER — POLYETHYLENE GLYCOL 3350 17 G/17G
17 POWDER, FOR SOLUTION ORAL DAILY
Status: DISCONTINUED | OUTPATIENT
Start: 2022-10-17 | End: 2022-10-19 | Stop reason: HOSPADM

## 2022-10-17 RX ORDER — GUAIFENESIN 600 MG/1
600 TABLET, EXTENDED RELEASE ORAL 2 TIMES DAILY
Status: DISCONTINUED | OUTPATIENT
Start: 2022-10-17 | End: 2022-10-19 | Stop reason: HOSPADM

## 2022-10-17 RX ORDER — CALCIUM CARBONATE 200(500)MG
2 TABLET,CHEWABLE ORAL 2 TIMES DAILY PRN
Status: DISCONTINUED | OUTPATIENT
Start: 2022-10-17 | End: 2022-10-19 | Stop reason: HOSPADM

## 2022-10-17 RX ORDER — ACETAMINOPHEN 325 MG/1
650 TABLET ORAL EVERY 4 HOURS PRN
Status: DISCONTINUED | OUTPATIENT
Start: 2022-10-17 | End: 2022-10-19 | Stop reason: HOSPADM

## 2022-10-17 RX ORDER — MAGNESIUM SULFATE HEPTAHYDRATE 40 MG/ML
4 INJECTION, SOLUTION INTRAVENOUS AS NEEDED
Status: DISCONTINUED | OUTPATIENT
Start: 2022-10-17 | End: 2022-10-19 | Stop reason: HOSPADM

## 2022-10-17 RX ORDER — NITROGLYCERIN 0.4 MG/1
0.4 TABLET SUBLINGUAL
Status: DISCONTINUED | OUTPATIENT
Start: 2022-10-17 | End: 2022-10-19 | Stop reason: HOSPADM

## 2022-10-17 RX ORDER — LANOLIN ALCOHOL/MO/W.PET/CERES
1 CREAM (GRAM) TOPICAL DAILY
Status: DISCONTINUED | OUTPATIENT
Start: 2022-10-17 | End: 2022-10-19 | Stop reason: HOSPADM

## 2022-10-17 RX ORDER — CYCLOBENZAPRINE HCL 10 MG
10 TABLET ORAL NIGHTLY
Status: DISCONTINUED | OUTPATIENT
Start: 2022-10-17 | End: 2022-10-19 | Stop reason: HOSPADM

## 2022-10-17 RX ORDER — MECLIZINE HYDROCHLORIDE 25 MG/1
25 TABLET ORAL 3 TIMES DAILY PRN
Status: DISCONTINUED | OUTPATIENT
Start: 2022-10-17 | End: 2022-10-19 | Stop reason: HOSPADM

## 2022-10-17 RX ORDER — ROPINIROLE 2 MG/1
2 TABLET, FILM COATED ORAL NIGHTLY
Status: DISCONTINUED | OUTPATIENT
Start: 2022-10-17 | End: 2022-10-19 | Stop reason: HOSPADM

## 2022-10-17 RX ORDER — ACETAMINOPHEN 650 MG/1
650 SUPPOSITORY RECTAL EVERY 4 HOURS PRN
Status: DISCONTINUED | OUTPATIENT
Start: 2022-10-17 | End: 2022-10-19 | Stop reason: HOSPADM

## 2022-10-17 RX ORDER — ENOXAPARIN SODIUM 100 MG/ML
40 INJECTION SUBCUTANEOUS EVERY 24 HOURS
Status: DISCONTINUED | OUTPATIENT
Start: 2022-10-17 | End: 2022-10-18

## 2022-10-17 RX ORDER — HYDROCODONE BITARTRATE AND ACETAMINOPHEN 7.5; 325 MG/1; MG/1
1 TABLET ORAL EVERY 6 HOURS PRN
Status: DISCONTINUED | OUTPATIENT
Start: 2022-10-17 | End: 2022-10-19 | Stop reason: HOSPADM

## 2022-10-17 RX ORDER — LISINOPRIL 40 MG/1
40 TABLET ORAL DAILY
Status: DISCONTINUED | OUTPATIENT
Start: 2022-10-17 | End: 2022-10-19 | Stop reason: HOSPADM

## 2022-10-17 RX ORDER — FUROSEMIDE 40 MG/1
40 TABLET ORAL 2 TIMES DAILY
Status: DISCONTINUED | OUTPATIENT
Start: 2022-10-17 | End: 2022-10-19 | Stop reason: HOSPADM

## 2022-10-17 RX ORDER — HYDROCODONE BITARTRATE AND ACETAMINOPHEN 5; 325 MG/1; MG/1
1 TABLET ORAL EVERY 8 HOURS PRN
Status: DISCONTINUED | OUTPATIENT
Start: 2022-10-17 | End: 2022-10-17 | Stop reason: SDUPTHER

## 2022-10-17 RX ORDER — IPRATROPIUM BROMIDE AND ALBUTEROL SULFATE 2.5; .5 MG/3ML; MG/3ML
3 SOLUTION RESPIRATORY (INHALATION)
Status: DISCONTINUED | OUTPATIENT
Start: 2022-10-17 | End: 2022-10-19 | Stop reason: HOSPADM

## 2022-10-17 RX ORDER — BUDESONIDE 0.5 MG/2ML
0.5 INHALANT ORAL
Status: DISCONTINUED | OUTPATIENT
Start: 2022-10-17 | End: 2022-10-19 | Stop reason: HOSPADM

## 2022-10-17 RX ORDER — PANTOPRAZOLE SODIUM 40 MG/1
40 TABLET, DELAYED RELEASE ORAL EVERY MORNING
Status: DISCONTINUED | OUTPATIENT
Start: 2022-10-17 | End: 2022-10-18

## 2022-10-17 RX ORDER — ACETAMINOPHEN 160 MG/5ML
650 SOLUTION ORAL EVERY 4 HOURS PRN
Status: DISCONTINUED | OUTPATIENT
Start: 2022-10-17 | End: 2022-10-19 | Stop reason: HOSPADM

## 2022-10-17 RX ORDER — SODIUM CHLORIDE 0.9 % (FLUSH) 0.9 %
10 SYRINGE (ML) INJECTION AS NEEDED
Status: DISCONTINUED | OUTPATIENT
Start: 2022-10-17 | End: 2022-10-19 | Stop reason: HOSPADM

## 2022-10-17 RX ORDER — CETIRIZINE HYDROCHLORIDE 10 MG/1
10 TABLET ORAL DAILY
Status: DISCONTINUED | OUTPATIENT
Start: 2022-10-17 | End: 2022-10-19 | Stop reason: HOSPADM

## 2022-10-17 RX ORDER — POTASSIUM CHLORIDE 1.5 G/1.77G
40 POWDER, FOR SOLUTION ORAL AS NEEDED
Status: DISCONTINUED | OUTPATIENT
Start: 2022-10-17 | End: 2022-10-19 | Stop reason: HOSPADM

## 2022-10-17 RX ORDER — ASPIRIN 81 MG/1
81 TABLET ORAL DAILY
Status: DISCONTINUED | OUTPATIENT
Start: 2022-10-17 | End: 2022-10-19 | Stop reason: HOSPADM

## 2022-10-17 RX ORDER — ATORVASTATIN CALCIUM 20 MG/1
40 TABLET, FILM COATED ORAL DAILY
Status: DISCONTINUED | OUTPATIENT
Start: 2022-10-17 | End: 2022-10-19 | Stop reason: HOSPADM

## 2022-10-17 RX ORDER — ZOLPIDEM TARTRATE 5 MG/1
5 TABLET ORAL NIGHTLY
Status: DISCONTINUED | OUTPATIENT
Start: 2022-10-17 | End: 2022-10-19 | Stop reason: HOSPADM

## 2022-10-17 RX ORDER — ONDANSETRON 4 MG/1
4 TABLET, FILM COATED ORAL EVERY 6 HOURS PRN
Status: DISCONTINUED | OUTPATIENT
Start: 2022-10-17 | End: 2022-10-19 | Stop reason: HOSPADM

## 2022-10-17 RX ORDER — POTASSIUM CHLORIDE 750 MG/1
40 TABLET, FILM COATED, EXTENDED RELEASE ORAL AS NEEDED
Status: DISCONTINUED | OUTPATIENT
Start: 2022-10-17 | End: 2022-10-19 | Stop reason: HOSPADM

## 2022-10-17 RX ORDER — POTASSIUM CHLORIDE 750 MG/1
10 TABLET, FILM COATED, EXTENDED RELEASE ORAL DAILY
Status: DISCONTINUED | OUTPATIENT
Start: 2022-10-17 | End: 2022-10-19 | Stop reason: HOSPADM

## 2022-10-17 RX ADMIN — MAGNESIUM OXIDE 400 MG (241.3 MG MAGNESIUM) TABLET 400 MG: TABLET at 12:52

## 2022-10-17 RX ADMIN — Medication 10 ML: at 01:33

## 2022-10-17 RX ADMIN — ROPINIROLE 2 MG: 2 TABLET, FILM COATED ORAL at 21:20

## 2022-10-17 RX ADMIN — CEFTRIAXONE SODIUM 1 G: 1 INJECTION, POWDER, FOR SOLUTION INTRAMUSCULAR; INTRAVENOUS at 01:25

## 2022-10-17 RX ADMIN — POTASSIUM CHLORIDE 10 MEQ: 750 TABLET, EXTENDED RELEASE ORAL at 08:43

## 2022-10-17 RX ADMIN — ATORVASTATIN CALCIUM 40 MG: 20 TABLET, FILM COATED ORAL at 08:42

## 2022-10-17 RX ADMIN — CEFTRIAXONE SODIUM 1 G: 1 INJECTION, POWDER, FOR SOLUTION INTRAMUSCULAR; INTRAVENOUS at 21:20

## 2022-10-17 RX ADMIN — POLYETHYLENE GLYCOL 3350 17 G: 17 POWDER, FOR SOLUTION ORAL at 08:42

## 2022-10-17 RX ADMIN — HYDROCODONE BITARTRATE AND ACETAMINOPHEN 1 TABLET: 7.5; 325 TABLET ORAL at 08:56

## 2022-10-17 RX ADMIN — IPRATROPIUM BROMIDE AND ALBUTEROL SULFATE 3 ML: 2.5; .5 SOLUTION RESPIRATORY (INHALATION) at 14:49

## 2022-10-17 RX ADMIN — ZOLPIDEM TARTRATE 5 MG: 5 TABLET ORAL at 01:58

## 2022-10-17 RX ADMIN — ENOXAPARIN SODIUM 40 MG: 100 INJECTION SUBCUTANEOUS at 05:41

## 2022-10-17 RX ADMIN — ROFLUMILAST 500 MCG: 500 TABLET ORAL at 08:42

## 2022-10-17 RX ADMIN — CYCLOBENZAPRINE 10 MG: 10 TABLET, FILM COATED ORAL at 21:19

## 2022-10-17 RX ADMIN — AMLODIPINE BESYLATE 10 MG: 10 TABLET ORAL at 08:42

## 2022-10-17 RX ADMIN — GUAIFENESIN 600 MG: 600 TABLET, EXTENDED RELEASE ORAL at 21:20

## 2022-10-17 RX ADMIN — LISINOPRIL 40 MG: 40 TABLET ORAL at 08:42

## 2022-10-17 RX ADMIN — CARVEDILOL 6.25 MG: 6.25 TABLET, FILM COATED ORAL at 08:42

## 2022-10-17 RX ADMIN — IPRATROPIUM BROMIDE AND ALBUTEROL SULFATE 3 ML: 2.5; .5 SOLUTION RESPIRATORY (INHALATION) at 07:00

## 2022-10-17 RX ADMIN — IOPAMIDOL 75 ML: 612 INJECTION, SOLUTION INTRAVENOUS at 09:50

## 2022-10-17 RX ADMIN — HYDROCODONE BITARTRATE AND ACETAMINOPHEN 1 TABLET: 5; 325 TABLET ORAL at 01:59

## 2022-10-17 RX ADMIN — AZITHROMYCIN 500 MG: 500 INJECTION, POWDER, LYOPHILIZED, FOR SOLUTION INTRAVENOUS at 05:41

## 2022-10-17 RX ADMIN — HYDROCODONE BITARTRATE AND ACETAMINOPHEN 1 TABLET: 7.5; 325 TABLET ORAL at 21:20

## 2022-10-17 RX ADMIN — FUROSEMIDE 40 MG: 40 TABLET ORAL at 08:42

## 2022-10-17 RX ADMIN — FUROSEMIDE 40 MG: 40 TABLET ORAL at 21:19

## 2022-10-17 RX ADMIN — BUDESONIDE 0.5 MG: 0.5 INHALANT ORAL at 07:06

## 2022-10-17 RX ADMIN — POTASSIUM CHLORIDE 40 MEQ: 750 TABLET, EXTENDED RELEASE ORAL at 12:52

## 2022-10-17 RX ADMIN — HYDROCODONE BITARTRATE AND ACETAMINOPHEN 1 TABLET: 7.5; 325 TABLET ORAL at 14:38

## 2022-10-17 RX ADMIN — BUDESONIDE 0.5 MG: 0.5 INHALANT ORAL at 20:32

## 2022-10-17 RX ADMIN — ASPIRIN 81 MG: 81 TABLET, FILM COATED ORAL at 08:42

## 2022-10-17 RX ADMIN — GUAIFENESIN 600 MG: 600 TABLET, EXTENDED RELEASE ORAL at 08:42

## 2022-10-17 RX ADMIN — Medication 10 ML: at 21:21

## 2022-10-17 RX ADMIN — CETIRIZINE HYDROCHLORIDE 10 MG: 10 TABLET ORAL at 08:42

## 2022-10-17 RX ADMIN — IPRATROPIUM BROMIDE AND ALBUTEROL SULFATE 3 ML: 2.5; .5 SOLUTION RESPIRATORY (INHALATION) at 20:32

## 2022-10-17 RX ADMIN — POTASSIUM CHLORIDE 40 MEQ: 750 TABLET, EXTENDED RELEASE ORAL at 08:43

## 2022-10-17 RX ADMIN — CARVEDILOL 6.25 MG: 6.25 TABLET, FILM COATED ORAL at 18:28

## 2022-10-17 RX ADMIN — PANTOPRAZOLE SODIUM 40 MG: 40 TABLET, DELAYED RELEASE ORAL at 06:10

## 2022-10-17 RX ADMIN — ZOLPIDEM TARTRATE 5 MG: 5 TABLET ORAL at 21:20

## 2022-10-17 RX ADMIN — Medication 10 ML: at 08:43

## 2022-10-17 RX ADMIN — SERTRALINE 100 MG: 100 TABLET, FILM COATED ORAL at 08:42

## 2022-10-17 NOTE — PLAN OF CARE
Goal Outcome Evaluation:  Plan of Care Reviewed With: patient           Outcome Evaluation: Referred for bedside swallow evaluation. RN reports patient has been tolerating regular diet/thin liquids well. When speaking with patient, she denied signs/symptoms of dysphagia and declined swallow evaluation; however, she stated she would continue monitoring for these signs and notify medical team. At this time, ST to sign off. Please re-consult with new order as indicated.

## 2022-10-17 NOTE — ED NOTES
Nursing report ED to floor  Paz Browne  67 y.o.  female    HPI (triage note):   Chief Complaint   Patient presents with    Shortness of Breath    Flank Pain       Admitting doctor:   Ovi Chavez MD    Admitting diagnosis:   The primary encounter diagnosis was Pneumonia of both lower lobes due to infectious organism. Diagnoses of Sepsis without acute organ dysfunction, due to unspecified organism (HCC), Leukocytosis, unspecified type, Chronic obstructive pulmonary disease, unspecified COPD type (HCC), and History of endocarditis were also pertinent to this visit.    Code status:   Current Code Status       Date Active Code Status Order ID Comments User Context       Prior            Allergies:   Bupropion, Cephalexin, and Metoprolol    Past Medical History:  Past Medical History:   Diagnosis Date    VAN (acute kidney injury) (HCC)     Anemia     Asthma     Atrial flutter (HCC)     cardioversion    Cataract     Celiac artery stenosis (HCC)     Chronic respiratory failure with hypoxia (HCC)     Colon polyp     COPD (chronic obstructive pulmonary disease) (HCC)     GI bleed     Hiatal hernia     History of CHF (congestive heart failure)     due to MR    History of home oxygen therapy     3 lpm NC    History of mitral valve replacement with tissue graft     Hyperlipidemia     Hypertension     Infectious viral hepatitis     B    Intertrigo     Long term (current) use of anticoagulants     Mitral regurgitation     s/p tissue MVR    PAF (paroxysmal atrial fibrillation) (HCC)     s/p MAZE    Pneumonia     Pulmonary hypertension (HCC)     S/P TVR (tricuspid valve repair) 7/7/2016        Weight:   There were no vitals filed for this visit.    Most recent vitals:   Vitals:    10/16/22 2131 10/16/22 2201 10/16/22 2231 10/16/22 2251   BP: 164/64 142/59 139/81    Pulse: 92 93 83    Resp:       Temp:    99.7 °F (37.6 °C)   TempSrc:    Oral   SpO2: 98% 96% 96%        Active LDAs/IV Access:   Lines, Drains & Airways        Active LDAs       Name Placement date Placement time Site Days    PICC Single Lumen 07/07/21 Left Basilic 07/07/21  1207  Basilic  466    Peripheral IV 10/16/22 2037 Right Antecubital 10/16/22  2037  Antecubital  less than 1    Peripheral IV 10/16/22 2109 Anterior;Left Forearm 10/16/22  2109  Forearm  less than 1                    Labs (abnormal labs have a star):   Labs Reviewed   COMPREHENSIVE METABOLIC PANEL - Abnormal; Notable for the following components:       Result Value    Glucose 120 (*)     Potassium 3.0 (*)     Chloride 97 (*)     CO2 29.5 (*)     All other components within normal limits    Narrative:     GFR Normal >60  Chronic Kidney Disease <60  Kidney Failure <15     CBC WITH AUTO DIFFERENTIAL - Abnormal; Notable for the following components:    WBC 17.75 (*)     Neutrophil % 86.9 (*)     Lymphocyte % 7.7 (*)     Monocyte % 4.1 (*)     Immature Grans % 0.6 (*)     Neutrophils, Absolute 15.45 (*)     Immature Grans, Absolute 0.10 (*)     All other components within normal limits   RESPIRATORY PANEL PCR W/ COVID-19 (SARS-COV-2) KAJAL/MARILIN/DEBBIE/PAD/COR/MAD/DAVID IN-HOUSE, NP [facility]AB IN [facility], 3-4 HR TAT - Normal    Narrative:     In the setting of a positive respiratory panel with a viral infection PLUS a negative procalcitonin without other underlying concern for bacterial infection, consider observing off antibiotics or discontinuation of antibiotics and continue supportive care. If the respiratory panel is positive for atypical bacterial infection (Bordetella pertussis, Chlamydophila pneumoniae, or Mycoplasma pneumoniae), consider antibiotic de-escalation to target atypical bacterial infection.   LACTIC ACID, PLASMA - Normal   PROCALCITONIN - Normal    Narrative:     As a Marker for Sepsis (Non-Neonates):    1. <0.5 ng/mL represents a low risk of severe sepsis and/or septic shock.  2. >2 ng/mL represents a high risk of severe sepsis and/or septic shock.    As a Marker for Lower Respiratory Tract  "Infections that require antibiotic therapy:    PCT on Admission    Antibiotic Therapy       6-12 Hrs later    >0.5                Strongly Recommended  >0.25 - <0.5        Recommended   0.1 - 0.25          Discouraged              Remeasure/reassess PCT  <0.1                Strongly Discouraged     Remeasure/reassess PCT    As 28 day mortality risk marker: \"Change in Procalcitonin Result\" (>80% or <=80%) if Day 0 (or Day 1) and Day 4 values are available. Refer to http://www.[facility]-pct-calculator.com    Change in PCT <=80%  A decrease of PCT levels below or equal to 80% defines a positive change in PCT test result representing a higher risk for 28-day all-cause mortality of patients diagnosed with severe sepsis for septic shock.    Change in PCT >80%  A decrease of PCT levels of more than 80% defines a negative change in PCT result representing a lower risk for 28-day all-cause mortality of patients diagnosed with severe sepsis or septic shock.      MAGNESIUM - Normal   BLOOD CULTURE   BLOOD CULTURE   RAINBOW DRAW    Narrative:     The following orders were created for panel order [facility] Draw.  Procedure                               Abnormality         Status                     ---------                               -----------         ------                     Green Top (Gel)[[id number]]                                  Final result               Lavender Top[[id number]]                                     Final result               Gold Top - SST[[id number]]                                   Final result               Light Blue Top[[id number]]                                   Final result                 Please view results for these tests on the individual orders.   URINALYSIS W/ MICROSCOPIC IF INDICATED (NO CULTURE)   GREEN TOP   LAVENDER TOP   GOLD TOP - SST   LIGHT BLUE TOP   CBC AND DIFFERENTIAL    Narrative:     The following orders were created for panel order CBC & Differential.  Procedure               "                 Abnormality         Status                     ---------                               -----------         ------                     CBC Auto Differential[[id number]]        Abnormal            Final result                 Please view results for these tests on the individual orders.       EKG:   ECG 12 Lead   Preliminary Result   HEART RATE= 101  bpm   RR Interval= 594  ms   IN Interval= 50  ms   P Horizontal Axis= 8  deg   P Front Axis= 0  deg   QRSD Interval= 102  ms   QT Interval= 351  ms   QRS Axis= 61  deg   T Wave Axis= 51  deg   - ABNORMAL ECG -   Sinus tachycardia   Atrial premature complexes   Probable left ventricular hypertrophy   Repol abnrm suggests ischemia, diffuse leads   Electronically Signed By:    Date and Time of Study: 2022-10-16 20:20:37          Meds given in ED:   Medications   acetaminophen (TYLENOL) tablet 1,000 mg (1,000 mg Oral Given 10/16/22 2102)   cefTRIAXone (ROCEPHIN) 1 g in sodium chloride 0.9 % 100 mL IVPB-VTB (0 g Intravenous Stopped 10/16/22 2210)   potassium chloride (K-DUR,KLOR-CON) ER tablet 40 mEq (40 mEq Oral Given 10/16/22 2251)       Imaging results:  XR Chest 1 View    Result Date: 10/16/2022  Suspected patchy infiltrates at the lung bases bilaterally. Pneumonia is not excluded.  This report was finalized on 10/16/2022 9:50 PM by Dr. Leigh Parr M.D.       Ambulatory status:   - assist x2    Social issues:   Social History     Socioeconomic History    Marital status:     Number of children: 2   Tobacco Use    Smoking status: Former     Packs/day: 3.00     Years: 54.00     Pack years: 162.00     Types: Cigarettes     Quit date: 2007     Years since quitting: 15.8    Smokeless tobacco: Never    Tobacco comments:     caffeine - tea or mt dew    Vaping Use    Vaping Use: Never used   Substance and Sexual Activity    Alcohol use: No    Drug use: No    Sexual activity: Defer          NIH Stroke Scale:         Gavino Eisenberg RN  10/16/22 22:54  EDT

## 2022-10-17 NOTE — PLAN OF CARE
Goal Outcome Evaluation:   Pt rested well after hs sleep aid and lortab given for back pain.MD saw pt and meds continued. Urine sent still waiting on pt to provide sputum sample.

## 2022-10-17 NOTE — H&P
Patient Name:  Paz Browne  YOB: 1955  MRN:  [id number]  Admit Date:  10/16/2022  Patient Care Team:  Javan Martinez MD as PCP - General (Internal Medicine)  Javan Martinez MD as PCP - Family Medicine  Ag Melo Jr., MD as Consulting Physician (Pulmonary Disease)  Cleveland Devine MD as Consulting Physician (Gastroenterology)  Earnest Gan MD as Consulting Physician (Cardiology)  Javan Martinez MD Zhong, Wangjian, MD PhD as Consulting Physician (Hematology and Oncology)  Javan Martinez MD as Referring Physician (Internal Medicine)        Chief complaint.  Aggressive shortness of breath.  Duration 2 days.    History Present Illness     A pleasant 67 years old white female excess smoker with a past history of oxygen dependent COPD and chronic hypoxemic respiratory failure on home oxygen/hypertension/A. fib/status post aortic valve replacement (tissue valve)/hiatal hernia/GERD/generalized osteoarthritis/celiac artery stenosis/dyslipidemia/hyperlipidemIA/restless leg syndrome/iron deficiency anemia who presents to the emergency department with progressive shortness of breath associated with increasing cough and wheeze over the last 2 days.  She has recently been treated for COPD exacerbation with steroids and antibiotics.  And has finished treatment approximately 2 days ago.  She does describe fever and chills/lower extremity edema/paroxysmal nocturnal dyspnea.  There was no hemoptysis.  No palpitation.  No chest pain.  In the emergency department a chest x-ray revealed bilateral infiltrate.  UA was negative for UTI.  Respiratory PCR was negative.  EKG revealed controlled atrial fibrillation.  Magnesium was normal.  CBC was normal except a white count of 17.7.  Procalcitonin and lactic acid were normal.  CMP was normal except a random blood sugar of 120, potassium is 3, chloride 97  CO2 29.5.  Blood cultures obtained in the ER.  Patient was subsequently  admitted.        Review of Systems   Cardiovascular/respiratory.  As above.  GI.  Positive loose stools without fresh bright blood per rectum or melena.  Positive occasional abdominal pain.  No choking with the food or the liquids.  No dysphagia or odynophagia.  No heartburn.  No nausea or vomiting.  .  Positive old frequency.  No dysuria or hematuria.  CNS.  No headache/loss of consciousness/dizziness.  No focal neurological symptoms.    Personal History     Past Medical History:   Diagnosis Date   • VAN (acute kidney injury) (HCC)    • Anemia    • Asthma    • Atrial flutter (HCC)     cardioversion   • Cataract    • Celiac artery stenosis (HCC)    • Chronic respiratory failure with hypoxia (HCC)    • Colon polyp    • COPD (chronic obstructive pulmonary disease) (HCC)    • GI bleed    • Hiatal hernia    • History of CHF (congestive heart failure)     due to MR   • History of home oxygen therapy     3 lpm NC   • History of mitral valve replacement with tissue graft    • Hyperlipidemia    • Hypertension    • Infectious viral hepatitis     B   • Intertrigo    • Long term (current) use of anticoagulants    • Mitral regurgitation     s/p tissue MVR   • PAF (paroxysmal atrial fibrillation) (HCC)     s/p MAZE   • Pneumonia    • Pulmonary hypertension (HCC)    • S/P TVR (tricuspid valve repair) 7/7/2016     Past Surgical History:   Procedure Laterality Date   • CARDIAC CATHETERIZATION  09/01/2014    Right dominant systemt, normal coronary arteries.    • CARDIAC CATHETERIZATION Left 6/10/2016    Procedure: Cardiac catheterization;  Surgeon: Sergei Hall MD;  Location: [facility] INVASIVE LOCATION;  Service:    • CARDIAC CATHETERIZATION N/A 6/10/2016    Procedure: Right Heart Cath;  Surgeon: Sergei Hall MD;  Location: [facility] INVASIVE LOCATION;  Service:    • CATARACT EXTRACTION     • COLONOSCOPY     • COLONOSCOPY N/A 8/4/2017    Procedure: COLONOSCOPY TO CECUM/TI WITH POLYPECTOMY ( COLD BX);  Surgeon: Cleveland Devine MD;   Location: [facility] ENDOSCOPY;  Service:    • COLONOSCOPY N/A 8/10/2017    Procedure: COLONOSCOPY to cecum and TI with 2 clips placed at transverse;  Surgeon: Earnest PALOMO MD;  Location: [facility] ENDOSCOPY;  Service:    • COLONOSCOPY N/A 12/22/2017    Procedure: COLONOSCOPY INTO CECUM WITH COLD POLYPECTOMIES;  Surgeon: Cleveland Devine MD;  Location: [facility] ENDOSCOPY;  Service:    • COLONOSCOPY N/A 5/17/2021    Procedure: COLONOSCOPY to cecum;  Surgeon: Cleveland Devine MD;  Location: [facility] ENDOSCOPY;  Service: Gastroenterology;  Laterality: N/A;  Pre: Fe deficency anemia, h/x of polyps  Post: fair prep, normal   • ENDOSCOPY N/A 8/17/2017    Procedure: ESOPHAGOGASTRODUODENOSCOPY;  Surgeon: Porsha Ruby MD;  Location: [facility] ENDOSCOPY;  Service:    • ENDOSCOPY N/A 12/22/2017    Procedure: ESOPHAGOGASTRODUODENOSCOPY WITH BIOPSIES;  Surgeon: Cleveland Devine MD;  Location: [facility] ENDOSCOPY;  Service:    • ENDOSCOPY N/A 5/17/2021    Procedure: ESOPHAGOGASTRODUODENOSCOPY  with biopsies;  Surgeon: Cleveland Devine MD;  Location: [facility] ENDOSCOPY;  Service: Gastroenterology;  Laterality: N/A;  Pre: Fe deficency anemia, nausea, heme positive stool   Post: gastritis, sloughing of distal esophagus mucosa   • GALLBLADDER SURGERY     • HEMORRHOIDECTOMY     • HYSTERECTOMY     • KIDNEY SURGERY  04/22/2013    Stent placement   • MAZE PROCEDURE     • MITRAL VALVE REPLACEMENT     • TONSILLECTOMY     • TRICUSPID VALVE REPLACEMENT       Family History   Adopted: Yes   Problem Relation Age of Onset   • No Known Problems Mother    • No Known Problems Father      Social History     Tobacco Use   • Smoking status: Former     Packs/day: 3.00     Years: 54.00     Pack years: 162.00     Types: Cigarettes     Quit date: 2007     Years since quitting: 15.8   • Smokeless tobacco: Never   • Tobacco comments:     caffeine - tea or mt dew    Vaping Use   • Vaping Use: Never used   Substance Use Topics   • Alcohol use: No   • Drug use: No     No current  facility-administered medications on file prior to encounter.     Current Outpatient Medications on File Prior to Encounter   Medication Sig Dispense Refill   • albuterol (PROVENTIL) (2.5 MG/3ML) 0.083% nebulizer solution USE 1 VIAL VIA NEBULIZER EVERY FOUR HOURS AS NEEDED FOR WHEEZING 150 mL 3   • albuterol sulfate  (90 Base) MCG/ACT inhaler Inhale 2 puffs Every 4 (Four) Hours As Needed for Wheezing. 18 g 3   • amLODIPine (NORVASC) 10 MG tablet TAKE 1 TABLET BY MOUTH EVERY DAY 90 tablet 1   • aspirin 81 MG EC tablet Take 1 tablet by mouth Daily.     • atorvastatin (LIPITOR) 40 MG tablet TAKE 1 TABLET BY MOUTH EVERY DAY 90 tablet 2   • azithromycin (ZITHROMAX) 250 MG tablet Take 1 tablet by mouth daily for 4 days. 4 tablet 0   • benzonatate (TESSALON) 100 MG capsule TAKE 1 CAPSULE BY MOUTH 2 TIMES A DAY AS NEEDED FOR COUGH 180 capsule 1   • butalbital-acetaminophen-caffeine (FIORICET, ESGIC) -40 MG per tablet Take 1 tablet by mouth Every 6 (Six) Hours As Needed for Headache. 4 tablet 0   • cyclobenzaprine (FLEXERIL) 10 MG tablet Take 1 tablet by mouth every night at bedtime. 90 tablet 3   • doxycycline (VIBRAMYICN) 100 MG tablet Take 1 tablet by mouth 2 (Two) Times a Day. 20 tablet 0   • fluticasone-salmeterol (Advair Diskus) 250-50 MCG/DOSE DISKUS Inhale 1 puff 2 (Two) Times a Day. 1 each 0   • furosemide (LASIX) 40 MG tablet Take 1 tablet by mouth 2 (Two) Times a Day. 60 tablet 3   • guaiFENesin (MUCINEX) 600 MG 12 hr tablet Take 600 mg by mouth 2 (Two) Times a Day.     • HYDROcodone-acetaminophen (NORCO) 5-325 MG per tablet Take 1 tablet by mouth Every 8 (Eight) Hours As Needed for Moderate Pain. Chronic pain medicine for low back pain 90 tablet 0   • lisinopril (PRINIVIL,ZESTRIL) 40 MG tablet Take 40 mg by mouth Daily.     • loratadine (CLARITIN) 10 MG tablet Take 10 mg by mouth 2 (two) times a day.     • Magnesium Oxide 400 (240 Mg) MG tablet TAKE 1 TABLET BY MOUTH EVERY DAY 90 tablet 1   •  meclizine (ANTIVERT) 25 MG tablet Take 1 tablet by mouth 3 (Three) Times a Day As Needed for Dizziness. prn 30 tablet 3   • Multiple Vitamins-Minerals (MULTIVITAL) tablet Take 1 tablet by mouth Daily.     • Nebulizer device 1 Units 4 (Four) Times a Day As Needed (Wheezing). J44.1 j20.8 1 each 0   • O2 (OXYGEN) Inhale 3 L/min Continuous.     • Omega-3 Fatty Acids (fish oil) 1000 MG capsule capsule Take  by mouth Every Night.     • omeprazole (priLOSEC) 40 MG capsule TAKE 1 CAPSULE BY MOUTH EVERY DAY 90 capsule 1   • ondansetron (ZOFRAN) 4 MG tablet Take 1 tablet by mouth Every 12 (Twelve) Hours As Needed for Nausea or Vomiting. 90 tablet 3   • polyethylene glycol (MIRALAX) packet Take 17 g by mouth 2 (Two) Times a Day. (Patient taking differently: Take 17 g by mouth 2 (Two) Times a Day As Needed.)     • potassium chloride 10 MEQ CR tablet Take 1 tablet by mouth Daily. 90 tablet 1   • predniSONE (DELTASONE) 20 MG tablet 2 tablets by mouth daily for 5 days 10 tablet 0   • rOPINIRole (REQUIP) 2 MG tablet TAKE 1 TABLET BY MOUTH EVERY NIGHT 90 tablet 2   • sertraline (ZOLOFT) 100 MG tablet TAKE 1 TABLET BY MOUTH EVERY DAY 90 tablet 1   • Sodium Chloride Flush (sodium chloride 0.9 % flush) 0.9 % solution      • Spiriva HandiHaler 18 MCG per inhalation capsule PLACE 1 CAPSULE INTO INHALER AND INHALE DAILY. *NEEDS TO MAKE APPT* 30 capsule 0   • zaleplon (SONATA) 10 MG capsule Take 1 capsule by mouth Every Night. 30 capsule 3   • carvedilol (COREG) 6.25 MG tablet TAKE 1 TABLET BY MOUTH TWICE A DAY WITH MEALS 180 tablet 2   • potassium chloride 10 MEQ CR tablet Take 1 tablet by mouth Daily. 5 tablet 0   • roflumilast (DALIRESP) 500 MCG tablet tablet Take 1 tablet by mouth Daily. 90 tablet 0   • Symjepi 0.3 MG/0.3ML solution prefilled syringe        Allergies   Allergen Reactions   • Bupropion Itching   • Cephalexin Itching     Tolerated piperacillin/tazobactam, ampicillin, cefdinir, and ceftriaxone   • Metoprolol Itching        Objective    Objective     Vital Signs  Temp:  [97.5 °F (36.4 °C)-103.1 °F (39.5 °C)] 97.5 °F (36.4 °C)  Heart Rate:  [60-99] 95  Resp:  [20-22] 20  BP: (130-177)/() 130/73  SpO2:  [81 %-98 %] 95 %  on  Flow (L/min):  [2-3] 3;   Device (Oxygen Therapy): nasal cannula  Body mass index is 22.35 kg/m².    Physical Exam  General.  Middle-aged female.  Appears older than stated age.  She is alert and oriented x3.  No apparent pain/distress/diaphoresis.  And normal mood and affect.  Coughing frequently during the interview.  Eyes.  Pupils equal round and reactive.  Intact extraocular musculature.  No pallor or jaundice.  Oral cavity.  Moist mucous membranes with no lesions.  Neck.  Supple.  No JVD.  No lymphadenopathy or thyromegaly.  Cardiovascular.  Controlled rate atrial fibrillation and grade 2 systolic murmur with a sternotomy scar.  Chest.  Poor bilateral air entry with scattered bilateral expiratory wheeze and bilateral rhonchi.  Abdomen.  Soft lax.  No tenderness.  No organomegaly.  No guarding or rebound.  Extremities.  No clubbing cyanosis or edema.    CNS.  No focal neurological deficits.      Results Review:  I reviewed the patient's new clinical results.  I reviewed the patient's new imaging results and agree with the interpretation.  I reviewed the patient's other test results and agree with the interpretation  I personally viewed and interpreted the patient's EKG/Telemetry data  Discussed with ED provider.    Lab Results (last 24 hours)     Procedure Component Value Units Date/Time    CBC & Differential [[id number]]  (Abnormal) Collected: 10/16/22 2038    Specimen: Blood Updated: 10/16/22 2110    Narrative:      The following orders were created for panel order CBC & Differential.  Procedure                               Abnormality         Status                     ---------                               -----------         ------                     CBC Auto Differential[[id number]]         "Abnormal            Final result                 Please view results for these tests on the individual orders.    Comprehensive Metabolic Panel [[id number]]  (Abnormal) Collected: 10/16/22 2038    Specimen: Blood Updated: 10/16/22 2130     Glucose 120 mg/dL      BUN 10 mg/dL      Creatinine 0.82 mg/dL      Sodium 139 mmol/L      Potassium 3.0 mmol/L      Chloride 97 mmol/L      CO2 29.5 mmol/L      Calcium 9.6 mg/dL      Total Protein 7.2 g/dL      Albumin 4.40 g/dL      ALT (SGPT) 12 U/L      AST (SGOT) 17 U/L      Alkaline Phosphatase 99 U/L      Total Bilirubin 1.1 mg/dL      Globulin 2.8 gm/dL      A/G Ratio 1.6 g/dL      BUN/Creatinine Ratio 12.2     Anion Gap 12.5 mmol/L      eGFR 77.5 mL/min/1.73      Comment: National Kidney Foundation and American Society of Nephrology (ASN) Task Force recommended calculation based on the Chronic Kidney Disease Epidemiology Collaboration (CKD-EPI) equation refit without adjustment for race.       Narrative:      GFR Normal >60  Chronic Kidney Disease <60  Kidney Failure <15      Procalcitonin [[id number]]  (Normal) Collected: 10/16/22 2038    Specimen: Blood Updated: 10/16/22 2136     Procalcitonin 0.06 ng/mL     Narrative:      As a Marker for Sepsis (Non-Neonates):    1. <0.5 ng/mL represents a low risk of severe sepsis and/or septic shock.  2. >2 ng/mL represents a high risk of severe sepsis and/or septic shock.    As a Marker for Lower Respiratory Tract Infections that require antibiotic therapy:    PCT on Admission    Antibiotic Therapy       6-12 Hrs later    >0.5                Strongly Recommended  >0.25 - <0.5        Recommended   0.1 - 0.25          Discouraged              Remeasure/reassess PCT  <0.1                Strongly Discouraged     Remeasure/reassess PCT    As 28 day mortality risk marker: \"Change in Procalcitonin Result\" (>80% or <=80%) if Day 0 (or Day 1) and Day 4 values are available. Refer to http://www.[employer]-pct-calculator.com    Change in PCT " <=80%  A decrease of PCT levels below or equal to 80% defines a positive change in PCT test result representing a higher risk for 28-day all-cause mortality of patients diagnosed with severe sepsis for septic shock.    Change in PCT >80%  A decrease of PCT levels of more than 80% defines a negative change in PCT result representing a lower risk for 28-day all-cause mortality of patients diagnosed with severe sepsis or septic shock.       CBC Auto Differential [[id number]]  (Abnormal) Collected: 10/16/22 2038    Specimen: Blood Updated: 10/16/22 2110     WBC 17.75 10*3/mm3      RBC 4.28 10*6/mm3      Hemoglobin 12.2 g/dL      Hematocrit 35.6 %      MCV 83.2 fL      MCH 28.5 pg      MCHC 34.3 g/dL      RDW 12.6 %      RDW-SD 38.1 fl      MPV 10.6 fL      Platelets 268 10*3/mm3      Neutrophil % 86.9 %      Lymphocyte % 7.7 %      Monocyte % 4.1 %      Eosinophil % 0.5 %      Basophil % 0.2 %      Immature Grans % 0.6 %      Neutrophils, Absolute 15.45 10*3/mm3      Lymphocytes, Absolute 1.37 10*3/mm3      Monocytes, Absolute 0.72 10*3/mm3      Eosinophils, Absolute 0.08 10*3/mm3      Basophils, Absolute 0.03 10*3/mm3      Immature Grans, Absolute 0.10 10*3/mm3      nRBC 0.0 /100 WBC     Magnesium [[id number]]  (Normal) Collected: 10/16/22 2038    Specimen: Blood Updated: 10/16/22 2236     Magnesium 1.7 mg/dL     Blood Culture - Blood, Arm, Left [[id number]] Collected: 10/16/22 2109    Specimen: Blood from Arm, Left Updated: 10/16/22 2116    Blood Culture - Blood, Arm, Right [[id number]] Collected: 10/16/22 2109    Specimen: Blood from Arm, Right Updated: 10/16/22 2116    Lactic Acid, Plasma [[id number]]  (Normal) Collected: 10/16/22 2109    Specimen: Blood Updated: 10/16/22 2138     Lactate 0.9 mmol/L     Respiratory Panel PCR w/COVID-19(SARS-CoV-2) KAJAL/MARILIN/DEBBIE/PAD/COR/MAD/DAVID In-House, NP Swab in UTM/VTM, 3-4 HR TAT - Swab, Nasopharynx [[id number]]  (Normal) Collected: 10/16/22 2113    Specimen: Swab from Nasopharynx  Updated: 10/16/22 2212     ADENOVIRUS, PCR Not Detected     Coronavirus 229E Not Detected     Coronavirus HKU1 Not Detected     Coronavirus NL63 Not Detected     Coronavirus OC43 Not Detected     COVID19 Not Detected     Human Metapneumovirus Not Detected     Human Rhinovirus/Enterovirus Not Detected     Influenza A PCR Not Detected     Influenza B PCR Not Detected     Parainfluenza Virus 1 Not Detected     Parainfluenza Virus 2 Not Detected     Parainfluenza Virus 3 Not Detected     Parainfluenza Virus 4 Not Detected     RSV, PCR Not Detected     Bordetella pertussis pcr Not Detected     Bordetella parapertussis PCR Not Detected     Chlamydophila pneumoniae PCR Not Detected     Mycoplasma pneumo by PCR Not Detected    Narrative:      In the setting of a positive respiratory panel with a viral infection PLUS a negative procalcitonin without other underlying concern for bacterial infection, consider observing off antibiotics or discontinuation of antibiotics and continue supportive care. If the respiratory panel is positive for atypical bacterial infection (Bordetella pertussis, Chlamydophila pneumoniae, or Mycoplasma pneumoniae), consider antibiotic de-escalation to target atypical bacterial infection.    Urinalysis With Microscopic If Indicated (No Culture) - Urine, Catheter [[id number]]  (Abnormal) Collected: 10/16/22 2243    Specimen: Urine, Catheter Updated: 10/16/22 2351     Color, UA Yellow     Appearance, UA Clear     pH, UA 7.0     Specific Gravity, UA 1.015     Glucose, UA Negative     Ketones, UA Negative     Bilirubin, UA Negative     Blood, UA Negative     Protein,  mg/dL (2+)     Leuk Esterase, UA Negative     Nitrite, UA Negative     Urobilinogen, UA 1.0 E.U./dL    Urinalysis, Microscopic Only - Urine, Catheter [[id number]] Collected: 10/16/22 2243    Specimen: Urine, Catheter Updated: 10/17/22 0014     RBC, UA 0-2 /HPF      WBC, UA 0-2 /HPF      Bacteria, UA None Seen /HPF      Squamous Epithelial  Cells, UA 0-2 /HPF      Hyaline Casts, UA 0-2 /LPF      Methodology Manual Light Microscopy    Legionella Antigen, Urine - Urine, Urine, Clean Catch [[id number]] Collected: 10/17/22 0202    Specimen: Urine, Clean Catch Updated: 10/17/22 0212    S. Pneumo Ag Urine or CSF - Urine, Urine, Clean Catch [[id number]] Collected: 10/17/22 0202    Specimen: Urine, Clean Catch Updated: 10/17/22 0212          Imaging Results (Last 24 Hours)     Procedure Component Value Units Date/Time    XR Chest 1 View [[id number]] Collected: 10/16/22 2149     Updated: 10/16/22 2153    Narrative:      SINGLE VIEW OF THE CHEST     HISTORY: Fever and shortness of air     COMPARISON: 09/18/2022     FINDINGS:  Heart size is within normal limits for technique. Patient is status post  median sternotomy. Advanced background changes of COPD are seen. Patient  is suspected to have some patchy infiltrate at the lung bases  bilaterally. No pneumothorax or pleural effusion is seen.       Impression:      Suspected patchy infiltrates at the lung bases bilaterally. Pneumonia is  not excluded.     This report was finalized on 10/16/2022 9:50 PM by Dr. Leigh Parr M.D.             Results for orders placed during the hospital encounter of 02/16/22    Adult Transthoracic Echo Complete W/ Cont if Necessary Per Protocol    Interpretation Summary  · Calculated left ventricular EF = 54.8% Estimated left ventricular EF was in agreement with the calculated left ventricular EF. Left ventricular systolic function is normal. Septal wall motion is abnormal, consistent with a post-operative state. Normal left ventricular cavity size noted. Left ventricular wall thickness is consistent with mild concentric hypertrophy. Left ventricular diastolic function was indeterminate.  · The right ventricular cavity is mildly dilated. Normal right ventricular systolic function noted.  · The left atrial cavity is mildly dilated.  · Right atrium not well visualized.  · The aortic  valve is abnormal in structure. There is mild to moderate thickening of the aortic valve. The aortic valve appears trileaflet. Trace aortic valve regurgitation is present. No hemodynamically significant aortic valve stenosis is present.  · No significant mitral valve regurgitation is present. There is a 31 mm, porcine, [employer] bioprosthetic mitral valve present. The mitral valve peak and mean gradients are within defined limits. The prosthetic mitral valve is grossly normal.  · Mild to moderate tricuspid valve regurgitation is present. Estimated right ventricular systolic pressure from tricuspid regurgitation is moderately elevated (45-55 mmHg). Calculated right ventricular systolic pressure from tricuspid regurgitation is 48.0 mmHg. No evidence of significant tricuspid valve stenosis is present. There is a tricuspid ring valve present.      ECG 12 Lead   Final Result   HEART RATE= 101  bpm   RR Interval= 594  ms   WV Interval= 50  ms   P Horizontal Axis= 8  deg   P Front Axis= 0  deg   QRSD Interval= 102  ms   QT Interval= 351  ms   QRS Axis= 61  deg   T Wave Axis= 51  deg   - ABNORMAL ECG -   Atrial fibrillation   Probable left ventricular hypertrophy   Repol abnrm suggests ischemia, diffuse leads - new   Electronically Signed By: Khalida Velazquez ([facility]) 16-Oct-2022 23:46:34   Date and Time of Study: 2022-10-16 20:20:37               Assessment/Plan     Active Hospital Problems    Diagnosis  POA   • **Acute hypoxemic respiratory failure (HCC) [J96.01]  Yes   • COPD (chronic obstructive pulmonary disease) (HCC) [J44.9]  Yes   • Pulmonary nodule [R91.1]  Yes   • Peptic ulcer disease [K27.9]  Yes   • Peripheral vascular disease (HCC) [I73.9]  Yes   • Hyperlipidemia [E78.5]  Yes   • Hypokalemia [E87.6]  Yes   • Hyperglycemia [R73.9]  Yes   • Pneumonia of both lower lobes due to infectious organism [J18.9]  Yes   • Chronic hypoxemic respiratory failure (HCC) [J96.11]  Yes   • Restless leg syndrome [G25.81]  Yes       Resolved Hospital Problems   No resolved problems to display.           · Acute on chronic hypoxemic respiratory failure secondary to bilateral pneumonia in a patient with a history of COPD/aspiration pneumonia/pulmonary nodule.  She is status post recent COPD exacerbation treatment.  Respiratory PCR panel is negative.  Plan check blood cultures and sputum cultures.  Speech therapy evaluation.  Check urine Legionella and Streptococcus antigen.  We will continue Daliresp and Mucinex.  Hold Advair and Spiriva and initiate DuoNeb's and Pulmicort mini nebs.  Initiate Zithromax and Rocephin.  · History of hypertension/atrial fibrillation/aortic valve replacement with tissue valve.  Last 2D echo on 2/22 revealing a normal ejection fraction was 30 aortic valve and trace aortic regurgitation and tricuspid regurgitation.  Good blood pressure control.  A. fib rate is controlled.  Patient not on anticoagulation secondary to significant GI bleed before.  No clinical evidence of angina or congestive heart failure.  We will continue Norvasc/aspirin/Coreg/Lasix/lisinopril.  Check troponin and proBNP.  EKG without acute ischemic changes.  · Hiatal hernia/GERD/colon polyp/history of GI bleed.  Asymptomatic except abdominal pain and loose stool.  Benign GI examination.  Continue proton pump inhibitors.  Normal liver function test.  Normal hemoglobin.  · Leukocytosis.  Secondary to pneumonia and outpatient steroids..  Management as above.  · History of peripheral vascular disease (celiac artery)/hyperlipidemia.  Continue aspirin and Lipitor.  · Hyperglycemia.  Check A1c.  · Hypokalemia.  Will check magnesium and substitute potassium.  · Restless leg syndrome.  Continue Requip.  · VTE prophylaxis.  Lovenox.    Discussed my findings and plan of treatment with the patient/ER provider.  Code Status -full code.       Ovi Chavez MD  [facility]  10/17/22  04:44 EDT

## 2022-10-17 NOTE — ED PROVIDER NOTES
EMERGENCY DEPARTMENT ENCOUNTER    Room Number:  11/11  Date seen:  10/16/2022  PCP: Javan Martinez MD  Historian: Patient      HPI:  Chief Complaint: Short of breath, fever  A complete HPI/ROS/PMH/PSH/SH/FH are unobtainable due to: Nothing  Context: Paz Browne is a 67 y.o. female who presents to the ED c/o recurrent shortness of breath and fever.  Patient reports that she recently had a COPD exacerbation.  She had completed a course of antibiotics and then also completed course of oral steroids last Saturday.  She was feeling better and [place]  On Thursday she began feeling bad again.  She reports shortness of breath and also fever and chills.  No nausea or vomiting.  She has had a little bit of diarrhea.    She has a history of endocarditis in the past.  She also has a history of atrial fibrillation but is not on anticoagulation due to bleeding complications.        PAST MEDICAL HISTORY  Active Ambulatory Problems     Diagnosis Date Noted   • PAF (paroxysmal atrial fibrillation) (Formerly [facility]) 01/25/2016   • Hypertension    • Hyperlipidemia    • Pain medication agreement 05/04/2016   • Hiatal hernia 05/04/2016   • Primary osteoarthritis involving multiple joints 05/04/2016   • Pulmonary hypertension (Formerly [facility])    • S/P TVR (tricuspid valve repair) 07/07/2016   • Pulmonary nodule 10/13/2016   • RLS (restless legs syndrome) 11/18/2016   • Chronic low back pain 08/02/2017   • Dysplastic polyp of colon 08/07/2017   • History of mitral valve replacement with tissue graft 08/11/2017   • Chronic respiratory failure with hypoxia ([facility]ly [facility]) 08/13/2017   • Anemia 08/09/2017   • Celiac artery stenosis ([facility]ly [facility]) 08/24/2017   • Intertrigo 08/25/2017   • COPD (chronic obstructive pulmonary disease) (Formerly [facility]) 06/30/2019   • Abnormal EKG 06/30/2019   • Muscle spasms of both lower extremities 12/22/2020   • Iron deficiency anemia 03/30/2021   • Adenomatous polyp of colon 03/30/2021   •  Gastroesophageal reflux disease without esophagitis 03/30/2021   • Headache 07/04/2021   • History of endocarditis 02/03/2022     Resolved Ambulatory Problems     Diagnosis Date Noted   • Tachycardia    • Renal failure    • Palpitations    • Mitral regurgitation    • Heart failure ([facility]) 06/08/2016   • Long term current use of anticoagulant 10/13/2016   • Aspiration pneumonia ([facility]) 10/14/2016   • Hemoptysis 10/14/2016   • Lower GI bleed 08/09/2017   • Precordial pain 08/11/2017   • Oral candidiasis 08/14/2017   • VAN (acute kidney injury) ([facility]) 08/15/2017   • GI bleed 12/01/2017   • Acute exacerbation of chronic obstructive pulmonary disease (COPD) ([facility]) 01/02/2018   • Pneumonia due to infectious organism 07/18/2018   • Insomnia due to medical condition 02/01/2019   • Shingles 04/08/2020   • Vertigo 10/06/2020   • Dark stools 03/30/2021   • Acute hyperkalemia 05/10/2021   • Tremor 05/10/2021   • Anemia 05/10/2021   • COPD exacerbation ([facility]) 06/14/2021   • Septicemia ([facility]) 07/02/2021   • Bacteremia 07/03/2021     Past Medical History:   Diagnosis Date   • Asthma    • Atrial flutter ([facility])    • Cataract    • Colon polyp    • History of CHF (congestive heart failure)    • History of home oxygen therapy    • Infectious viral hepatitis    • Long term (current) use of anticoagulants    • Pneumonia          PAST SURGICAL HISTORY  Past Surgical History:   Procedure Laterality Date   • CARDIAC CATHETERIZATION  09/01/2014    Right dominant systemt, normal coronary arteries.    • CARDIAC CATHETERIZATION Left 6/10/2016    Procedure: Cardiac catheterization;  Surgeon: Sergei Hall MD;  Location: [facility] CATH INVASIVE LOCATION;  Service:    • CARDIAC CATHETERIZATION N/A 6/10/2016    Procedure: Right Heart Cath;  Surgeon: Sergei Hall MD;  Location: [facility] INVASIVE LOCATION;  Service:    • CATARACT EXTRACTION     • COLONOSCOPY     • COLONOSCOPY N/A 8/4/2017    Procedure: COLONOSCOPY TO CECUM/TI WITH POLYPECTOMY ( COLD BX);  Surgeon:  Cleveland Devine MD;  Location: [facility] ENDOSCOPY;  Service:    • COLONOSCOPY N/A 8/10/2017    Procedure: COLONOSCOPY to cecum and TI with 2 clips placed at transverse;  Surgeon: Earnest PALOMO MD;  Location: [facility] ENDOSCOPY;  Service:    • COLONOSCOPY N/A 12/22/2017    Procedure: COLONOSCOPY INTO CECUM WITH COLD POLYPECTOMIES;  Surgeon: Cleveland Devine MD;  Location: [facility] ENDOSCOPY;  Service:    • COLONOSCOPY N/A 5/17/2021    Procedure: COLONOSCOPY to cecum;  Surgeon: Cleveland Devine MD;  Location: [facility] ENDOSCOPY;  Service: Gastroenterology;  Laterality: N/A;  Pre: Fe deficency anemia, h/x of polyps  Post: fair prep, normal   • ENDOSCOPY N/A 8/17/2017    Procedure: ESOPHAGOGASTRODUODENOSCOPY;  Surgeon: Porsha Ruby MD;  Location: [facility] ENDOSCOPY;  Service:    • ENDOSCOPY N/A 12/22/2017    Procedure: ESOPHAGOGASTRODUODENOSCOPY WITH BIOPSIES;  Surgeon: Cleveland Devine MD;  Location: [facility] ENDOSCOPY;  Service:    • ENDOSCOPY N/A 5/17/2021    Procedure: ESOPHAGOGASTRODUODENOSCOPY  with biopsies;  Surgeon: Cleveland Devine MD;  Location: [facility] ENDOSCOPY;  Service: Gastroenterology;  Laterality: N/A;  Pre: Fe deficency anemia, nausea, heme positive stool   Post: gastritis, sloughing of distal esophagus mucosa   • GALLBLADDER SURGERY     • HEMORRHOIDECTOMY     • HYSTERECTOMY     • KIDNEY SURGERY  04/22/2013    Stent placement   • MAZE PROCEDURE     • MITRAL VALVE REPLACEMENT     • TONSILLECTOMY     • TRICUSPID VALVE REPLACEMENT           FAMILY HISTORY  Family History   Adopted: Yes   Problem Relation Age of Onset   • No Known Problems Mother    • No Known Problems Father          SOCIAL HISTORY  Social History     Socioeconomic History   • Marital status:    • Number of children: 2   Tobacco Use   • Smoking status: Former     Packs/day: 3.00     Years: 54.00     Pack years: 162.00     Types: Cigarettes     Quit date: 2007     Years since quitting: 15.8   • Smokeless tobacco: Never   • Tobacco comments:      caffeine - tea or mt dew    Vaping Use   • Vaping Use: Never used   Substance and Sexual Activity   • Alcohol use: No   • Drug use: No   • Sexual activity: Defer         ALLERGIES  Bupropion, Cephalexin, and Metoprolol        REVIEW OF SYSTEMS  Review of Systems   Review of all 14 systems is negative other than stated in the HPI above.      PHYSICAL EXAM  ED Triage Vitals   Temp Heart Rate Resp BP SpO2   10/16/22 2013 10/16/22 2013 10/16/22 2013 10/16/22 2013 10/16/22 2013   (!) 103.1 °F (39.5 °C) 60 22 (!) 134/113 (!) 81 %      Temp src Heart Rate Source Patient Position BP Location FiO2 (%)   10/16/22 2251 -- -- -- --   Oral             GENERAL: Awake and alert, no acute distress  HENT: nares patent  EYES: no scleral icterus, EOMI  CV: irregular rhythm, normal rate, no murmur appreciated  RESPIRATORY: normal effort, lungs clear to auscultation bilaterally, no wheezing, nasal cannula oxygen in place  ABDOMEN: soft, nondistended, nontender throughout  MUSCULOSKELETAL: no deformity, no calf tenderness present bilaterally  NEURO: alert, moves all extremities, follows commands  PSYCH:  calm, cooperative  SKIN: warm, dry    Vital signs and nursing notes reviewed.          LAB RESULTS  Recent Results (from the past 24 hour(s))   ECG 12 Lead    Collection Time: 10/16/22  8:20 PM   Result Value Ref Range    QT Interval 351 ms   Green Top (Gel)    Collection Time: 10/16/22  8:38 PM   Result Value Ref Range    Extra Tube Hold for add-ons.    Lavender Top    Collection Time: 10/16/22  8:38 PM   Result Value Ref Range    Extra Tube hold for add-on    Gold Top - SST    Collection Time: 10/16/22  8:38 PM   Result Value Ref Range    Extra Tube Hold for add-ons.    Light Blue Top    Collection Time: 10/16/22  8:38 PM   Result Value Ref Range    Extra Tube Hold for add-ons.    Comprehensive Metabolic Panel    Collection Time: 10/16/22  8:38 PM    Specimen: Blood   Result Value Ref Range    Glucose 120 (H) 65 - 99 mg/dL    BUN 10 8  - 23 mg/dL    Creatinine 0.82 0.57 - 1.00 mg/dL    Sodium 139 136 - 145 mmol/L    Potassium 3.0 (L) 3.5 - 5.2 mmol/L    Chloride 97 (L) 98 - 107 mmol/L    CO2 29.5 (H) 22.0 - 29.0 mmol/L    Calcium 9.6 8.6 - 10.5 mg/dL    Total Protein 7.2 6.0 - 8.5 g/dL    Albumin 4.40 3.50 - 5.20 g/dL    ALT (SGPT) 12 1 - 33 U/L    AST (SGOT) 17 1 - 32 U/L    Alkaline Phosphatase 99 39 - 117 U/L    Total Bilirubin 1.1 0.0 - 1.2 mg/dL    Globulin 2.8 gm/dL    A/G Ratio 1.6 g/dL    BUN/Creatinine Ratio 12.2 7.0 - 25.0    Anion Gap 12.5 5.0 - 15.0 mmol/L    eGFR 77.5 >60.0 mL/min/1.73   Procalcitonin    Collection Time: 10/16/22  8:38 PM    Specimen: Blood   Result Value Ref Range    Procalcitonin 0.06 0.00 - 0.25 ng/mL   CBC Auto Differential    Collection Time: 10/16/22  8:38 PM    Specimen: Blood   Result Value Ref Range    WBC 17.75 (H) 3.40 - 10.80 10*3/mm3    RBC 4.28 3.77 - 5.28 10*6/mm3    Hemoglobin 12.2 12.0 - 15.9 g/dL    Hematocrit 35.6 34.0 - 46.6 %    MCV 83.2 79.0 - 97.0 fL    MCH 28.5 26.6 - 33.0 pg    MCHC 34.3 31.5 - 35.7 g/dL    RDW 12.6 12.3 - 15.4 %    RDW-SD 38.1 37.0 - 54.0 fl    MPV 10.6 6.0 - 12.0 fL    Platelets 268 140 - 450 10*3/mm3    Neutrophil % 86.9 (H) 42.7 - 76.0 %    Lymphocyte % 7.7 (L) 19.6 - 45.3 %    Monocyte % 4.1 (L) 5.0 - 12.0 %    Eosinophil % 0.5 0.3 - 6.2 %    Basophil % 0.2 0.0 - 1.5 %    Immature Grans % 0.6 (H) 0.0 - 0.5 %    Neutrophils, Absolute 15.45 (H) 1.70 - 7.00 10*3/mm3    Lymphocytes, Absolute 1.37 0.70 - 3.10 10*3/mm3    Monocytes, Absolute 0.72 0.10 - 0.90 10*3/mm3    Eosinophils, Absolute 0.08 0.00 - 0.40 10*3/mm3    Basophils, Absolute 0.03 0.00 - 0.20 10*3/mm3    Immature Grans, Absolute 0.10 (H) 0.00 - 0.05 10*3/mm3    nRBC 0.0 0.0 - 0.2 /100 WBC   Magnesium    Collection Time: 10/16/22  8:38 PM    Specimen: Blood   Result Value Ref Range    Magnesium 1.7 1.6 - 2.4 mg/dL   Lactic Acid, Plasma    Collection Time: 10/16/22  9:09 PM    Specimen: Blood   Result Value Ref  Range    Lactate 0.9 0.5 - 2.0 mmol/L   Respiratory Panel PCR w/COVID-19(SARS-CoV-2) KAJAL/MARILIN/DEBBIE/PAD/COR/MAD/DAVID In-House, NP Swab in UTM/VTM, 3-4 HR TAT - Swab, Nasopharynx    Collection Time: 10/16/22  9:13 PM    Specimen: Nasopharynx; Swab   Result Value Ref Range    ADENOVIRUS, PCR Not Detected Not Detected    Coronavirus 229E Not Detected Not Detected    Coronavirus HKU1 Not Detected Not Detected    Coronavirus NL63 Not Detected Not Detected    Coronavirus OC43 Not Detected Not Detected    COVID19 Not Detected Not Detected - Ref. Range    Human Metapneumovirus Not Detected Not Detected    Human Rhinovirus/Enterovirus Not Detected Not Detected    Influenza A PCR Not Detected Not Detected    Influenza B PCR Not Detected Not Detected    Parainfluenza Virus 1 Not Detected Not Detected    Parainfluenza Virus 2 Not Detected Not Detected    Parainfluenza Virus 3 Not Detected Not Detected    Parainfluenza Virus 4 Not Detected Not Detected    RSV, PCR Not Detected Not Detected    Bordetella pertussis pcr Not Detected Not Detected    Bordetella parapertussis PCR Not Detected Not Detected    Chlamydophila pneumoniae PCR Not Detected Not Detected    Mycoplasma pneumo by PCR Not Detected Not Detected       Ordered the above labs and reviewed the results.        RADIOLOGY  XR Chest 1 View    Result Date: 10/16/2022  SINGLE VIEW OF THE CHEST  HISTORY: Fever and shortness of air  COMPARISON: 09/18/2022  FINDINGS: Heart size is within normal limits for technique. Patient is status post median sternotomy. Advanced background changes of COPD are seen. Patient is suspected to have some patchy infiltrate at the lung bases bilaterally. No pneumothorax or pleural effusion is seen.      Suspected patchy infiltrates at the lung bases bilaterally. Pneumonia is not excluded.  This report was finalized on 10/16/2022 9:50 PM by Dr. Leigh Parr M.D.        Ordered the above noted radiological studies. Reviewed by me in PACS.             PROCEDURES  Critical Care  Performed by: Jaleel Stubbs MD  Authorized by: Jaleel Stubbs MD     Critical care provider statement:     Critical care time (minutes):  30    Critical care time was exclusive of:  Separately billable procedures and treating other patients    Critical care was necessary to treat or prevent imminent or life-threatening deterioration of the following conditions:  Sepsis and respiratory failure    Critical care was time spent personally by me on the following activities:  Development of treatment plan with patient or surrogate, discussions with consultants, evaluation of patient's response to treatment, examination of patient, obtaining history from patient or surrogate, ordering and review of laboratory studies, ordering and review of radiographic studies, ordering and performing treatments and interventions, pulse oximetry, re-evaluation of patient's condition and review of old charts                MEDICATIONS GIVEN IN ER  Medications   acetaminophen (TYLENOL) tablet 1,000 mg (1,000 mg Oral Given 10/16/22 2102)   cefTRIAXone (ROCEPHIN) 1 g in sodium chloride 0.9 % 100 mL IVPB-VTB (0 g Intravenous Stopped 10/16/22 2210)   potassium chloride (K-DUR,KLOR-CON) ER tablet 40 mEq (40 mEq Oral Given 10/16/22 2251)                   MEDICAL DECISION MAKING, PROGRESS, and CONSULTS    All labs have been independently reviewed by me.  All radiology studies have been reviewed by me and discussed with radiologist dictating the report.   EKG's independently viewed and interpreted by me.  Discussion below represents my analysis of pertinent findings related to patient's condition, differential diagnosis, treatment plan and final disposition.      Differential diagnosis:  Pneumonia  UTI  Sepsis  Endocarditis  Pulmonary embolus  COPD exacerbation  Viral syndrome      ED Course as of 10/16/22 2306   Sun Oct 16, 2022   2119 WBC(!): 17.75  Patient did recently complete a course of  steroids. [JR]   2119 Chest x-ray dependently interpreted in PACS.  There is evidence of previous sternotomy.  Perhaps some minimal atelectasis or infiltrate at left lung base. [JR]   2152 Potassium(!): 3.0 [JR]   2214 COVID19: Not Detected [JR]   2221 Discussed with Dr. Richardson [facility], who agrees to admit to inpatient telemetry bed. [JR]   2222 Patient was covered with empiric Rocephin for suspected pneumonia however she also has a history of endocarditis and she has complained of some bilateral back pain and urinalysis is pending as well. [JR]      ED Course User Index  [JR] Jaleel Stubbs MD     Patient is hemodynamically stable.  She was given empiric Rocephin and will be admitted for further management.  Blood cultures are pending.  She does have a history of endocarditis in the past.  She is currently stable from a respiratory standpoint on nasal cannula oxygen.         I wore an N95 mask, face shield, and gloves during this patient encounter.  Patient also wearing a surgical mask.  Hand hygeine performed before and after seeing the patient.    DIAGNOSIS  Final diagnoses:   Pneumonia of both lower lobes due to infectious organism   Sepsis without acute organ dysfunction, due to unspecified organism (HCC)   Leukocytosis, unspecified type   Chronic obstructive pulmonary disease, unspecified COPD type (HCC)   History of endocarditis         DISPOSITION  ADMIT            Latest Documented Vital Signs:  As of 23:06 EDT  BP- 139/81 HR- 83 Temp- 99.7 °F (37.6 °C) (Oral) O2 sat- 96%        --    Please note that portions of this were completed with a voice recognition program.          Jaleel Stubbs MD  10/16/22 1606

## 2022-10-17 NOTE — CASE MANAGEMENT/SOCIAL WORK
Discharge Planning Assessment  [facility]     Patient Name: Paz Browne  MRN: [id number]  Today's Date: 10/17/2022    Admit Date: 10/16/2022    Plan: Return home via spouse with family assist. Denies needs   Discharge Needs Assessment     Row Name 10/17/22 1922       Living Environment    People in Home spouse;other (see comments)    Name(s) of People in Home spouse Chapincito and brother in law    Current Living Arrangements home    Primary Care Provided by self    Provides Primary Care For no one    Family Caregiver if Needed spouse    Family Caregiver Names jeremy, Chapincito Browne [phone]    Quality of Family Relationships helpful;involved    Able to Return to Prior Arrangements yes       Resource/Environmental Concerns    Resource/Environmental Concerns none       Transition Planning    Patient/Family Anticipates Transition to home with family    Patient/Family Anticipated Services at Transition none    Transportation Anticipated family or friend will provide       Discharge Needs Assessment    Readmission Within the Last 30 Days no previous admission in last 30 days    Equipment Currently Used at Home pulse ox;oxygen;nebulizer;shower chair;grab bar    Concerns to be Addressed denies needs/concerns at this time;no discharge needs identified    Anticipated Changes Related to Illness none    Equipment Needed After Discharge none               Discharge Plan     Row Name 10/17/22 1925       Plan    Plan Return home via spouse with family assist. Denies needs    Patient/Family in Agreement with Plan yes    Plan Comments CCP met with patient and spouse, Chapincito, at bedside. Introduced self and explained role. Patient lives in a trailer with her  Chapincito and brother in law. Patient is mostly IADL and can drive if needed, but Chapincito and her brother in law assist home as needed and Chapincito provides most transportatiom. Patient sees Dr. Martinez for PCP. She denies any AD/LW documents and was made aware hospital   staff can assist with HCS/AD documents if interested. For DME patient has 2L o2 from [employer], nebulizer, pulse ox, grab bars. and shower seat. She has used HH in the past but cannot recall which agency and denies any [facility] history. At discharge she plans to return home and denies any discharge planning needs. Chapincito is able to provide transportation and assistance at home as needed and will bring oxygen tank at time of discharge. Sandra MORENO RN/CCP              Continued Care and Services - Admitted Since 10/16/2022    Coordination has not been started for this encounter.       Expected Discharge Date and Time     Expected Discharge Date Expected Discharge Time    Oct 19, 2022          Demographic Summary     Row Name 10/17/22 1920       General Information    Admission Type inpatient    Arrived From home    Required Notices Provided Important Message from Medicare    Referral Source admission list    Reason for Consult discharge planning    Preferred Language English       Contact Information    Permission Granted to Share Info With family/designee               Functional Status     Row Name 10/17/22 1922       Functional Status    Usual Activity Tolerance good    Current Activity Tolerance moderate       Functional Status, IADL    Medications assistive person    Meal Preparation assistive person    Housekeeping assistive person    Laundry assistive person    Shopping assistive person               Psychosocial    No documentation.                Abuse/Neglect    No documentation.                Legal    No documentation.                Substance Abuse    No documentation.                Patient Forms    No documentation.                   Mary Nunez

## 2022-10-17 NOTE — PLAN OF CARE
Goal Outcome Evaluation:  Plan of Care Reviewed With: patient, spouse        Progress: improving  Outcome Evaluation: Patient has been alert and oriented this shift. Complaint of pain x2 with prn pain medication given x2. Patient is getting up to bedside commode. Voiding adequately. Patient has been titrated back to home o2 baseline of 2L via [place]. Patient had CT of chest this shift. Continues on IV abt for ariadne pneumonia. Education on use of IS and ambulation and movement. Vital signs stable. Call light in reach. Safety maintained. Appetite and fluid intake fair. New order for chloaseptic lozenges.

## 2022-10-17 NOTE — ED TRIAGE NOTES
Patient from home via private vehicle reporting shortness of breath, headache, and flank pain. States she has COPD, recently had an exacerbation and was put on 2 rounds of steroids. States that her shortness of breath became worse today. Patient always wears 2 L of O2.

## 2022-10-18 PROBLEM — E87.6 HYPOKALEMIA: Status: RESOLVED | Noted: 2022-10-17 | Resolved: 2022-10-18

## 2022-10-18 LAB
ANION GAP SERPL CALCULATED.3IONS-SCNC: 6.7 MMOL/L (ref 5–15)
BASOPHILS # BLD AUTO: 0.04 10*3/MM3 (ref 0–0.2)
BASOPHILS NFR BLD AUTO: 0.3 % (ref 0–1.5)
BUN SERPL-MCNC: 13 MG/DL (ref 8–23)
BUN/CREAT SERPL: 12.4 (ref 7–25)
CALCIUM SPEC-SCNC: 9 MG/DL (ref 8.6–10.5)
CHLORIDE SERPL-SCNC: 103 MMOL/L (ref 98–107)
CO2 SERPL-SCNC: 24.3 MMOL/L (ref 22–29)
CREAT SERPL-MCNC: 1.05 MG/DL (ref 0.57–1)
DEPRECATED RDW RBC AUTO: 41.5 FL (ref 37–54)
EGFRCR SERPLBLD CKD-EPI 2021: 57.6 ML/MIN/1.73
EOSINOPHIL # BLD AUTO: 0.23 10*3/MM3 (ref 0–0.4)
EOSINOPHIL NFR BLD AUTO: 1.7 % (ref 0.3–6.2)
ERYTHROCYTE [DISTWIDTH] IN BLOOD BY AUTOMATED COUNT: 13 % (ref 12.3–15.4)
FERRITIN SERPL-MCNC: 479 NG/ML (ref 13–150)
GLUCOSE SERPL-MCNC: 105 MG/DL (ref 65–99)
HCT VFR BLD AUTO: 26.6 % (ref 34–46.6)
HCT VFR BLD AUTO: 28.6 % (ref 34–46.6)
HGB BLD-MCNC: 9.1 G/DL (ref 12–15.9)
HGB BLD-MCNC: 9.2 G/DL (ref 12–15.9)
IMM GRANULOCYTES # BLD AUTO: 0.05 10*3/MM3 (ref 0–0.05)
IMM GRANULOCYTES NFR BLD AUTO: 0.4 % (ref 0–0.5)
IRON 24H UR-MRATE: 22 MCG/DL (ref 37–145)
IRON SATN MFR SERPL: 8 % (ref 20–50)
LYMPHOCYTES # BLD AUTO: 1.47 10*3/MM3 (ref 0.7–3.1)
LYMPHOCYTES NFR BLD AUTO: 10.7 % (ref 19.6–45.3)
MCH RBC QN AUTO: 28.1 PG (ref 26.6–33)
MCHC RBC AUTO-ENTMCNC: 32.2 G/DL (ref 31.5–35.7)
MCV RBC AUTO: 87.5 FL (ref 79–97)
MONOCYTES # BLD AUTO: 0.56 10*3/MM3 (ref 0.1–0.9)
MONOCYTES NFR BLD AUTO: 4.1 % (ref 5–12)
NEUTROPHILS NFR BLD AUTO: 11.43 10*3/MM3 (ref 1.7–7)
NEUTROPHILS NFR BLD AUTO: 82.8 % (ref 42.7–76)
NRBC BLD AUTO-RTO: 0 /100 WBC (ref 0–0.2)
PLATELET # BLD AUTO: 244 10*3/MM3 (ref 140–450)
PMV BLD AUTO: 10.8 FL (ref 6–12)
POTASSIUM SERPL-SCNC: 4.5 MMOL/L (ref 3.5–5.2)
RBC # BLD AUTO: 3.27 10*6/MM3 (ref 3.77–5.28)
SODIUM SERPL-SCNC: 134 MMOL/L (ref 136–145)
TIBC SERPL-MCNC: 270 MCG/DL (ref 298–536)
TRANSFERRIN SERPL-MCNC: 181 MG/DL (ref 200–360)
WBC NRBC COR # BLD: 13.78 10*3/MM3 (ref 3.4–10.8)

## 2022-10-18 PROCEDURE — 84466 ASSAY OF TRANSFERRIN: CPT | Performed by: STUDENT IN AN ORGANIZED HEALTH CARE EDUCATION/TRAINING PROGRAM

## 2022-10-18 PROCEDURE — 97110 THERAPEUTIC EXERCISES: CPT

## 2022-10-18 PROCEDURE — 97161 PT EVAL LOW COMPLEX 20 MIN: CPT

## 2022-10-18 PROCEDURE — 85025 COMPLETE CBC W/AUTO DIFF WBC: CPT | Performed by: INTERNAL MEDICINE

## 2022-10-18 PROCEDURE — 25010000002 ENOXAPARIN PER 10 MG: Performed by: INTERNAL MEDICINE

## 2022-10-18 PROCEDURE — 94799 UNLISTED PULMONARY SVC/PX: CPT

## 2022-10-18 PROCEDURE — 80048 BASIC METABOLIC PNL TOTAL CA: CPT | Performed by: INTERNAL MEDICINE

## 2022-10-18 PROCEDURE — 25010000002 AZITHROMYCIN PER 500 MG: Performed by: INTERNAL MEDICINE

## 2022-10-18 PROCEDURE — 94664 DEMO&/EVAL PT USE INHALER: CPT

## 2022-10-18 PROCEDURE — 25010000002 CEFTRIAXONE PER 250 MG: Performed by: INTERNAL MEDICINE

## 2022-10-18 PROCEDURE — 85014 HEMATOCRIT: CPT | Performed by: STUDENT IN AN ORGANIZED HEALTH CARE EDUCATION/TRAINING PROGRAM

## 2022-10-18 PROCEDURE — 85018 HEMOGLOBIN: CPT | Performed by: STUDENT IN AN ORGANIZED HEALTH CARE EDUCATION/TRAINING PROGRAM

## 2022-10-18 PROCEDURE — 94761 N-INVAS EAR/PLS OXIMETRY MLT: CPT

## 2022-10-18 PROCEDURE — 82728 ASSAY OF FERRITIN: CPT | Performed by: STUDENT IN AN ORGANIZED HEALTH CARE EDUCATION/TRAINING PROGRAM

## 2022-10-18 PROCEDURE — 63710000001 PREDNISONE PER 1 MG: Performed by: STUDENT IN AN ORGANIZED HEALTH CARE EDUCATION/TRAINING PROGRAM

## 2022-10-18 PROCEDURE — 83540 ASSAY OF IRON: CPT | Performed by: STUDENT IN AN ORGANIZED HEALTH CARE EDUCATION/TRAINING PROGRAM

## 2022-10-18 RX ORDER — PANTOPRAZOLE SODIUM 40 MG/1
40 TABLET, DELAYED RELEASE ORAL
Status: DISCONTINUED | OUTPATIENT
Start: 2022-10-18 | End: 2022-10-19 | Stop reason: HOSPADM

## 2022-10-18 RX ORDER — BENZONATATE 100 MG/1
200 CAPSULE ORAL 3 TIMES DAILY PRN
Status: DISCONTINUED | OUTPATIENT
Start: 2022-10-18 | End: 2022-10-19 | Stop reason: HOSPADM

## 2022-10-18 RX ORDER — PREDNISONE 20 MG/1
40 TABLET ORAL
Status: DISCONTINUED | OUTPATIENT
Start: 2022-10-18 | End: 2022-10-19 | Stop reason: HOSPADM

## 2022-10-18 RX ORDER — PREDNISONE 20 MG/1
40 TABLET ORAL
Status: DISCONTINUED | OUTPATIENT
Start: 2022-10-18 | End: 2022-10-18

## 2022-10-18 RX ADMIN — CARVEDILOL 6.25 MG: 6.25 TABLET, FILM COATED ORAL at 10:42

## 2022-10-18 RX ADMIN — BUDESONIDE 0.5 MG: 0.5 INHALANT ORAL at 07:40

## 2022-10-18 RX ADMIN — IPRATROPIUM BROMIDE AND ALBUTEROL SULFATE 3 ML: 2.5; .5 SOLUTION RESPIRATORY (INHALATION) at 03:56

## 2022-10-18 RX ADMIN — HYDROCODONE BITARTRATE AND ACETAMINOPHEN 1 TABLET: 7.5; 325 TABLET ORAL at 21:39

## 2022-10-18 RX ADMIN — Medication 10 ML: at 12:16

## 2022-10-18 RX ADMIN — ROPINIROLE 2 MG: 2 TABLET, FILM COATED ORAL at 21:38

## 2022-10-18 RX ADMIN — BENZONATATE 200 MG: 100 CAPSULE ORAL at 21:38

## 2022-10-18 RX ADMIN — CETIRIZINE HYDROCHLORIDE 10 MG: 10 TABLET ORAL at 10:41

## 2022-10-18 RX ADMIN — AZITHROMYCIN 500 MG: 500 INJECTION, POWDER, LYOPHILIZED, FOR SOLUTION INTRAVENOUS at 06:20

## 2022-10-18 RX ADMIN — PANTOPRAZOLE SODIUM 40 MG: 40 TABLET, DELAYED RELEASE ORAL at 17:57

## 2022-10-18 RX ADMIN — ACETAMINOPHEN 650 MG: 325 TABLET, FILM COATED ORAL at 12:54

## 2022-10-18 RX ADMIN — FUROSEMIDE 40 MG: 40 TABLET ORAL at 21:38

## 2022-10-18 RX ADMIN — Medication 10 ML: at 21:35

## 2022-10-18 RX ADMIN — HYDROCODONE BITARTRATE AND ACETAMINOPHEN 1 TABLET: 7.5; 325 TABLET ORAL at 03:45

## 2022-10-18 RX ADMIN — POLYETHYLENE GLYCOL 3350 17 G: 17 POWDER, FOR SOLUTION ORAL at 10:42

## 2022-10-18 RX ADMIN — ENOXAPARIN SODIUM 40 MG: 100 INJECTION SUBCUTANEOUS at 06:21

## 2022-10-18 RX ADMIN — PREDNISONE 40 MG: 20 TABLET ORAL at 12:15

## 2022-10-18 RX ADMIN — LISINOPRIL 40 MG: 40 TABLET ORAL at 10:42

## 2022-10-18 RX ADMIN — IPRATROPIUM BROMIDE AND ALBUTEROL SULFATE 3 ML: 2.5; .5 SOLUTION RESPIRATORY (INHALATION) at 14:47

## 2022-10-18 RX ADMIN — BUDESONIDE 0.5 MG: 0.5 INHALANT ORAL at 19:33

## 2022-10-18 RX ADMIN — CEFTRIAXONE SODIUM 1 G: 1 INJECTION, POWDER, FOR SOLUTION INTRAMUSCULAR; INTRAVENOUS at 21:35

## 2022-10-18 RX ADMIN — IPRATROPIUM BROMIDE AND ALBUTEROL SULFATE 3 ML: 2.5; .5 SOLUTION RESPIRATORY (INHALATION) at 07:39

## 2022-10-18 RX ADMIN — FUROSEMIDE 40 MG: 40 TABLET ORAL at 10:42

## 2022-10-18 RX ADMIN — ATORVASTATIN CALCIUM 40 MG: 20 TABLET, FILM COATED ORAL at 10:42

## 2022-10-18 RX ADMIN — PANTOPRAZOLE SODIUM 40 MG: 40 TABLET, DELAYED RELEASE ORAL at 06:21

## 2022-10-18 RX ADMIN — CYCLOBENZAPRINE 10 MG: 10 TABLET, FILM COATED ORAL at 21:38

## 2022-10-18 RX ADMIN — POTASSIUM CHLORIDE 10 MEQ: 750 TABLET, EXTENDED RELEASE ORAL at 10:42

## 2022-10-18 RX ADMIN — IPRATROPIUM BROMIDE AND ALBUTEROL SULFATE 3 ML: 2.5; .5 SOLUTION RESPIRATORY (INHALATION) at 19:31

## 2022-10-18 RX ADMIN — BENZONATATE 200 MG: 100 CAPSULE ORAL at 12:54

## 2022-10-18 RX ADMIN — GUAIFENESIN 600 MG: 600 TABLET, EXTENDED RELEASE ORAL at 22:58

## 2022-10-18 RX ADMIN — AMLODIPINE BESYLATE 10 MG: 10 TABLET ORAL at 10:42

## 2022-10-18 RX ADMIN — ROFLUMILAST 500 MCG: 500 TABLET ORAL at 10:42

## 2022-10-18 RX ADMIN — ZOLPIDEM TARTRATE 5 MG: 5 TABLET ORAL at 21:39

## 2022-10-18 RX ADMIN — CARVEDILOL 6.25 MG: 6.25 TABLET, FILM COATED ORAL at 17:57

## 2022-10-18 RX ADMIN — GUAIFENESIN 600 MG: 600 TABLET, EXTENDED RELEASE ORAL at 10:42

## 2022-10-18 RX ADMIN — ASPIRIN 81 MG: 81 TABLET, FILM COATED ORAL at 10:42

## 2022-10-18 RX ADMIN — SERTRALINE 100 MG: 100 TABLET, FILM COATED ORAL at 10:42

## 2022-10-18 NOTE — PROGRESS NOTES
Name: Paz Browne ADMIT: 10/16/2022   : 1955  PCP: Javan Martinez MD    MRN: [id number] LOS: 2 days   AGE/SEX: 67 y.o. female  ROOM: [facility]     Subjective   Subjective   Laying in bed.  Patient states no significant change  in her shortness of breath ,present both at rest, worse with exertion.  Continues to have nonproductive cough.  Denies chest pain, fevers, chills, nausea, vomiting.    Review of Systems   As above  Objective   Objective   Vital Signs  Temp:  [98 °F (36.7 °C)-100.8 °F (38.2 °C)] 98.6 °F (37 °C)  Heart Rate:  [62-84] 73  Resp:  [18-22] 20  BP: (102-130)/(54-95) 120/95  SpO2:  [92 %-98 %] 97 %  on  Flow (L/min):  [2-2.5] 2;   Device (Oxygen Therapy): nasal cannula  Body mass index is 22.35 kg/m².  Physical Exam    General: Alert alert, lying in bed, not in distress, ill-appearing  HEENT: Normocephalic, atraumatic  CV: Irregular rhythm, no murmurs rubs or gallops  Lungs: Decreased, bilateral expiratory wheezing, on 2 L nasal cannula  abdomen: Soft, nontender, nondistended  Extremities: No significant peripheral edema , no cyanosis     Results Review     I reviewed the patient's new clinical results.  Results from last 7 days   Lab Units 10/18/22  0415 10/17/22  0516 10/16/22  2038   WBC 10*3/mm3 13.78* 12.01* 17.75*   HEMOGLOBIN g/dL 9.2* 10.1* 12.2   PLATELETS 10*3/mm3 244 199 268     Results from last 7 days   Lab Units 10/18/22  0415 10/17/22  1746 10/17/22  0516 10/16/22  2038   SODIUM mmol/L 134*  --  141 139   POTASSIUM mmol/L 4.5 4.1 3.2* 3.0*   CHLORIDE mmol/L 103  --  102 97*   CO2 mmol/L 24.3  --  26.1 29.5*   BUN mg/dL 13  --  9 10   CREATININE mg/dL 1.05*  --  0.73 0.82   GLUCOSE mg/dL 105*  --  107* 120*   Estimated Creatinine Clearance: 51.6 mL/min (A) (by C-G formula based on SCr of 1.05 mg/dL (H)).  Results from last 7 days   Lab Units 10/16/22  2038   ALBUMIN g/dL 4.40   BILIRUBIN mg/dL 1.1   ALK PHOS U/L 99   AST (SGOT) U/L 17   ALT (SGPT) U/L 12     Results from  last 7 days   Lab Units 10/18/22  0415 10/17/22  0516 10/16/22  2038   CALCIUM mg/dL 9.0 9.0 9.6   ALBUMIN g/dL  --   --  4.40   MAGNESIUM mg/dL  --  2.1 1.7     Results from last 7 days   Lab Units 10/16/22  2109 10/16/22  2038   PROCALCITONIN ng/mL  --  0.06   LACTATE mmol/L 0.9  --      COVID19   Date Value Ref Range Status   10/16/2022 Not Detected Not Detected - Ref. Range Final   09/18/2022 Not Detected Not Detected - Ref. Range Final     Hemoglobin A1C   Date/Time Value Ref Range Status   10/17/2022 0515 4.80 4.80 - 5.60 % Final       CT Chest With Contrast Diagnostic  Narrative: CT CHEST WITH IV CONTRAST     HISTORY: Possible sepsis, COPD, pulmonary nodules, pneumonia.     TECHNIQUE: Radiation dose reduction techniques were utilized, including  automated exposure control and exposure modulation based on body size.   3 mm images were obtained through the chest after the administration of  IV contrast.      COMPARISON: 10/15/2022, 10/27/2021     FINDINGS:     Thoracic inlet: Artifact from multiple overlying leads/motion.  Heterogeneous thyroid with stable hypodensities in the left lobe.     Heart and great vessels: Status post sternotomy. Cardiomegaly. Extensive  atherosclerosis thoracoabdominal aorta and branch vessels. Dense  calcification about the mitral annulus. No significant pericardial  effusion.     Lymphatics: Mediastinal adenopathy with an index 1 cm precarinal node, 1  cm subcarinal node, and mildly prominent 1.1 cm right hilar node.  Previously the subcarinal node approximated 5 mm in short axis.     Lung parenchyma and pleural space: Patent central airway. Presumed  diverticulum along the right posterior trachea similar to the prior.  Severe emphysema with pleural and parenchymal scarring. Nodular  thickening about the right apex similar in appearance to the prior.  Mosaic attenuation of the lungs with air trapping. New masslike  consolidation in the periphery of the right lower lobe and  patchy  groundglass opacities in the right middle lobe, particularly the lateral  segment. Additional reticulonodular opacities are present in the lingula  and left lower lobe but to a lesser degree. No effusions. No  pneumothorax. Calcified granulomas. There are a few scattered sub-5 mm  not definitely calcified nodules, some of which are more conspicuous  from the prior.     Upper abdomen: Cholecystectomy. Calcified granuloma spleen. Mild  prominence of the pancreatic duct measuring 4 mm similar to the prior.  Please refer to the prior CT abdomen for further detail.     Bone windows: Hypertrophic degenerative changes are again noted in  thoracolumbar spine.     Impression: 1. Multifocal pneumonia with more masslike consolidation in the right  lower lobe. Recommend attention on short-term follow-up to resolution in  2-3 months after treatment given background emphysema.  2. Mildly prominent mediastinal nodes likely reactive but can be  followed on subsequent surveillance.  3. Please see above for additional findings.     This report was finalized on 10/17/2022 1:57 PM by Dr. Silvia Goetz M.D.       Scheduled Medications  amLODIPine, 10 mg, Oral, Daily  aspirin, 81 mg, Oral, Daily  atorvastatin, 40 mg, Oral, Daily  azithromycin, 500 mg, Intravenous, Q24H  budesonide, 0.5 mg, Nebulization, BID - RT  carvedilol, 6.25 mg, Oral, BID With Meals  cefTRIAXone, 1 g, Intravenous, Q24H  cetirizine, 10 mg, Oral, Daily  cyclobenzaprine, 10 mg, Oral, Nightly  furosemide, 40 mg, Oral, BID  guaiFENesin, 600 mg, Oral, BID  ipratropium-albuterol, 3 mL, Nebulization, Q6H While Awake - RT  lisinopril, 40 mg, Oral, Daily  Magnesium Oxide, 1 tablet, Oral, Daily  pantoprazole, 40 mg, Oral, BID AC  polyethylene glycol, 17 g, Oral, Daily  potassium chloride, 10 mEq, Oral, Daily  predniSONE, 40 mg, Oral, Daily With Breakfast  roflumilast, 500 mcg, Oral, Daily  rOPINIRole, 2 mg, Oral, Nightly  sertraline, 100 mg, Oral, Daily  sodium chloride,  10 mL, Intravenous, Q12H  zolpidem, 5 mg, Oral, Nightly    Infusions   Diet  Diet Regular; Cardiac       Assessment/Plan     Active Hospital Problems    Diagnosis  POA   • **Acute hypoxemic respiratory failure (HCC) [J96.01]  Yes   • COPD (chronic obstructive pulmonary disease) (HCC) [J44.9]  Yes   • Pulmonary nodule [R91.1]  Yes   • Peptic ulcer disease [K27.9]  Yes   • Peripheral vascular disease (HCC) [I73.9]  Yes   • Hyperlipidemia [E78.5]  Yes   • Hyperglycemia [R73.9]  Yes   • Pneumonia of both lower lobes due to infectious organism [J18.9]  Yes   • Chronic hypoxemic respiratory failure (HCC) [J96.11]  Yes   • Restless leg syndrome [G25.81]  Yes      Resolved Hospital Problems    Diagnosis Date Resolved POA   • Hypokalemia [E87.6] 10/18/2022 Yes         Acute on chronic hypoxemic respiratory failure secondary to bilateral pneumonia in a patient with a history of COPD/aspiration pneumonia/pulmonary nodule.    -Continue DuoNebs, Pulmicort  -IV ceftriaxone and IV azithromycin for superimposed bacterial pneumonia  -Pulmonary hygiene, scheduled Mucinex, incentive parameter  -MRSA nares negative, strep and Legionella antigen negative  respiratory culture pending, blood culture pending  -Initiated on prednisone 40 mg daily for 5 days  -Pulmonology consult      History of hypertension/atrial fibrillation/aortic valve replacement with tissue valve.  -EKG shows atrial fibrillation, troponin negative.   Patient not on anticoagulation secondary to significant GI bleed before.    No clinical evidence of angina or congestive heart failure.    -continue Norvasc/aspirin/Coreg/Lasix/lisinopril.       Anemia: Hemoglobin 9.210/18.  Down from 10.1 yesterday.  No signs of bleeding.  Hemoglobin on admission was 12.2.  Iron panel 1/22 consistent with anemia of chronic disease.  Ordered repeat iron panel.  B12 ,folate.  Discontinue Lovenox as patient's hemoglobin has been trending for the last 2 days.  Repeat H&H this afternoon.  If  continues to trend down will consult GI.    Leukocytosis.  Secondary to pneumonia and outpatient steroids.     Peripheral vascular disease (celiac artery)/hyperlipidemia .  Continue aspirin and Lipitor.    Restless leg syndrome.  Continue Requip.    Hiatal hernia/GERD/colon polyp/history of GI bleed: Continue PPI      Discussed with patient, RN.      CODE STATUS: Full code  Disposition: To be determined  VTE prophylaxis.  Lovenox.      I wore protective equipment throughout this patient encounter including a face mask, gloves and protective eyewear.  Hand hygiene was performed before donning protective equipment and after removal when leaving the room.      Dictated utilizing Dragon dictation        Arina Castaneda MD  [facility]  10/18/22  10:17 EDT

## 2022-10-18 NOTE — THERAPY EVALUATION
Patient Name: Paz Browne  : 1955    MRN: [id number]                              Today's Date: 10/18/2022       Admit Date: 10/16/2022    Visit Dx:     ICD-10-CM ICD-9-CM   1. Pneumonia of both lower lobes due to infectious organism  J18.9 486   2. Sepsis without acute organ dysfunction, due to unspecified organism ([facility])  A41.9 038.9     995.91   3. Leukocytosis, unspecified type  D72.829 288.60   4. Chronic obstructive pulmonary disease, unspecified COPD type ([facility])  J44.9 496   5. History of endocarditis  Z86.79 V12.59     Patient Active Problem List   Diagnosis   • PAF (paroxysmal atrial fibrillation) ([facility])   • Hypertension   • Hyperlipidemia   • Pain medication agreement   • Hiatal hernia   • Primary osteoarthritis involving multiple joints   • Pulmonary hypertension ([facility])   • S/P TVR (tricuspid valve repair)   • Pulmonary nodule   • Restless leg syndrome   • Chronic low back pain   • Dysplastic polyp of colon   • History of mitral valve replacement with tissue graft   • Chronic hypoxemic respiratory failure ([facility])   • Anemia   • Celiac artery stenosis ([facility])   • Intertrigo   • COPD (chronic obstructive pulmonary disease) ([facility])   • Abnormal EKG   • Muscle spasms of both lower extremities   • Iron deficiency anemia   • Adenomatous polyp of colon   • Gastroesophageal reflux disease without esophagitis   • Headache   • History of endocarditis   • Pneumonia of both lower lobes due to infectious organism   • Acute hypoxemic respiratory failure (HCC)   • COPD (chronic obstructive pulmonary disease) ([facility])   • Pulmonary nodule   • Peptic ulcer disease   • Peripheral vascular disease ([facility])   • Hyperlipidemia   • Hyperglycemia     Past Medical History:   Diagnosis Date   • VAN (acute kidney injury) ([facility])    • Anemia    • Asthma    • Atrial flutter ([facility])     cardioversion   • Cataract    • Celiac artery stenosis ([facility])    • Chronic respiratory failure with hypoxia ([facility])    • Colon polyp    • COPD (chronic  obstructive pulmonary disease) (HCC)    • GI bleed    • Hiatal hernia    • History of CHF (congestive heart failure)     due to MR   • History of home oxygen therapy     3 lpm NC   • History of mitral valve replacement with tissue graft    • Hyperlipidemia    • Hypertension    • Infectious viral hepatitis     B   • Intertrigo    • Long term (current) use of anticoagulants    • Mitral regurgitation     s/p tissue MVR   • PAF (paroxysmal atrial fibrillation) (HCC)     s/p MAZE   • Pneumonia    • Pulmonary hypertension (HCC)    • S/P TVR (tricuspid valve repair) 7/7/2016     Past Surgical History:   Procedure Laterality Date   • CARDIAC CATHETERIZATION  09/01/2014    Right dominant systemt, normal coronary arteries.    • CARDIAC CATHETERIZATION Left 6/10/2016    Procedure: Cardiac catheterization;  Surgeon: Sergei Hall MD;  Location: [facility] CATH INVASIVE LOCATION;  Service:    • CARDIAC CATHETERIZATION N/A 6/10/2016    Procedure: Right Heart Cath;  Surgeon: Sergei Hall MD;  Location: [facility] CATH INVASIVE LOCATION;  Service:    • CATARACT EXTRACTION     • COLONOSCOPY     • COLONOSCOPY N/A 8/4/2017    Procedure: COLONOSCOPY TO CECUM/TI WITH POLYPECTOMY ( COLD BX);  Surgeon: Cleveland Devine MD;  Location: [facility] ENDOSCOPY;  Service:    • COLONOSCOPY N/A 8/10/2017    Procedure: COLONOSCOPY to cecum and TI with 2 clips placed at transverse;  Surgeon: Earnest PALOMO MD;  Location: [facility] ENDOSCOPY;  Service:    • COLONOSCOPY N/A 12/22/2017    Procedure: COLONOSCOPY INTO CECUM WITH COLD POLYPECTOMIES;  Surgeon: Cleveland Devine MD;  Location: [facility] ENDOSCOPY;  Service:    • COLONOSCOPY N/A 5/17/2021    Procedure: COLONOSCOPY to cecum;  Surgeon: Cleveland Devine MD;  Location: [facility] ENDOSCOPY;  Service: Gastroenterology;  Laterality: N/A;  Pre: Fe deficency anemia, h/x of polyps  Post: fair prep, normal   • ENDOSCOPY N/A 8/17/2017    Procedure: ESOPHAGOGASTRODUODENOSCOPY;  Surgeon: Porsha Ruby MD;  Location: [facility]  ENDOSCOPY;  Service:    • ENDOSCOPY N/A 12/22/2017    Procedure: ESOPHAGOGASTRODUODENOSCOPY WITH BIOPSIES;  Surgeon: Cleveland Devine MD;  Location: [facility] ENDOSCOPY;  Service:    • ENDOSCOPY N/A 5/17/2021    Procedure: ESOPHAGOGASTRODUODENOSCOPY  with biopsies;  Surgeon: Cleveland Devine MD;  Location: [facility] ENDOSCOPY;  Service: Gastroenterology;  Laterality: N/A;  Pre: Fe deficency anemia, nausea, heme positive stool   Post: gastritis, sloughing of distal esophagus mucosa   • GALLBLADDER SURGERY     • HEMORRHOIDECTOMY     • HYSTERECTOMY     • KIDNEY SURGERY  04/22/2013    Stent placement   • MAZE PROCEDURE     • MITRAL VALVE REPLACEMENT     • TONSILLECTOMY     • TRICUSPID VALVE REPLACEMENT        General Information     Row Name 10/18/22 1407          Physical Therapy Time and Intention    Document Type evaluation  -MD     Mode of Treatment physical therapy  -MD     Row Name 10/18/22 1407          General Information    Patient Profile Reviewed yes  -MD     Prior Level of Function independent:  -MD     Existing Precautions/Restrictions fall  -MD     Barriers to Rehab medically complex;previous functional deficit  -MD     Row Name 10/18/22 1407          Living Environment    People in Home spouse  -MD     Row Name 10/18/22 1407          Home Main Entrance    Number of Stairs, Main Entrance four  -MD     Stair Railings, Main Entrance none  pt states they are building a deck w a ramp  -MD     Row Name 10/18/22 1407          Stairs Within Home, Primary    Number of Stairs, Within Home, Primary none  -MD     Row Name 10/18/22 1407          Cognition    Orientation Status (Cognition) oriented x 3  -MD           User Key  (r) = Recorded By, (t) = Taken By, (c) = Cosigned By    Initials Name Provider Type    Chelo Pruitt, PT Physical Therapist               Mobility     Row Name 10/18/22 1415          Bed Mobility    Bed Mobility supine-sit  -MD     Supine-Sit [place] (Bed Mobility) standby assist  -MD     Assistive  Device (Bed Mobility) bed rails;head of bed elevated  -MD     Row Name 10/18/22 1410          Sit-Stand Transfer    Sit-Stand [place] (Transfers) verbal cues;contact guard  -MD     Row Name 10/18/22 1410          Gait/Stairs (Locomotion)    [place] Level (Gait) verbal cues;contact guard;minimum assist (75% patient effort)  -MD     Distance in Feet (Gait) 15x1 and 10'x1  -MD     Deviations/Abnormal Patterns (Gait) festinating/shuffling;gait speed decreased;base of support, narrow;stride length decreased  -MD     Bilateral Gait Deviations forward flexed posture  -MD     Comment, (Gait/Stairs) decreased ambulation distance due to SOA upon exertion  -MD           User Key  (r) = Recorded By, (t) = Taken By, (c) = Cosigned By    Initials Name Provider Type    Chelo Pruitt, PT Physical Therapist               Obj/Interventions     Row Name 10/18/22 1411          Range of Motion Comprehensive    General Range of Motion bilateral lower extremity ROM WFL  -MD     Row Name 10/18/22 1411          Strength Comprehensive (MMT)    General Manual Muscle Testing (MMT) Assessment lower extremity strength deficits identified  -MD     Comment, General Manual Muscle Testing (MMT) Assessment B LE 3/5 grossly  -MD           User Key  (r) = Recorded By, (t) = Taken By, (c) = Cosigned By    Initials Name Provider Type    Chelo Pruitt, PT Physical Therapist               Goals/Plan     Row Name 10/18/22 1417          Transfer Goal 1 (PT)    Activity/Assistive Device (Transfer Goal 1, PT) sit-to-stand/stand-to-sit  -MD     [place] Level/Cues Needed (Transfer Goal 1, PT) standby assist  -MD     Time Frame (Transfer Goal 1, PT) 1 week  -MD     Row Name 10/18/22 1417          Gait Training Goal 1 (PT)    Activity/Assistive Device (Gait Training Goal 1, PT) gait (walking locomotion)  -MD     [place] Level (Gait Training Goal 1, PT) standby assist  -MD     Distance (Gait Training Goal 1, PT) 120'  -MD     Time Frame (Gait  Training Goal 1, PT) 1 week  -MD           User Key  (r) = Recorded By, (t) = Taken By, (c) = Cosigned By    Initials Name Provider Type    Chelo Pruitt, PT Physical Therapist               Clinical Impression     Row Name 10/18/22 1411          Pain    Pretreatment Pain Rating 0/10 - no pain  -MD     Row Name 10/18/22 1411          Plan of Care Review    Outcome Evaluation Pt is 68 yo female presenting w PNA and acute hypoxic resp. failure. Prior to admission pt was independent w mobility living at home w spouse.  At time of PT eval pt required CGA for transfers and ambulation w/o AD.  Pt was able to ambulate approx 15' but was limited due to dyspnea on exertion.  Pt's O2 dropped to 86% following activity but recoverd to 91% after approx 1 min of rest in sitting.  Pt educated on importance of moving and getting out of bed.  PT advised pt to ambulate to restroom w nsg/nsg aide and to sit up in chair 3x daily.  Family and pt state understanding.  PT also encouraged use of IS.  Pt will benifit from skilled PT to address mobility deficits and increase safety and independence w functional mobility.  At this time PT is recommending home w assist and HHPT.  -MD     Row Name 10/18/22 1411          Therapy Assessment/Plan (PT)    Rehab Potential (PT) good, to achieve stated therapy goals  -MD     Criteria for Skilled Interventions Met (PT) yes;meets criteria  -MD     Therapy Frequency (PT) 6 times/wk  -MD     Row Name 10/18/22 1411          Positioning and Restraints    Pre-Treatment Position in bed  -MD     Post Treatment Position chair  -MD     In Chair sitting;call light within reach;exit alarm on;with family/caregiver  -MD           User Key  (r) = Recorded By, (t) = Taken By, (c) = Cosigned By    Initials Name Provider Type    Chelo Pruitt, PT Physical Therapist               Outcome Measures     Row Name 10/18/22 1417          How much help from another person do you currently need...    Turning from your back to  your side while in flat bed without using bedrails? 4  -MD     Moving from lying on back to sitting on the side of a flat bed without bedrails? 4  -MD     Moving to and from a bed to a chair (including a wheelchair)? 3  -MD     Standing up from a chair using your arms (e.g., wheelchair, bedside chair)? 3  -MD     Climbing 3-5 steps with a railing? 2  -MD     To walk in hospital room? 3  -MD     AM-PAC 6 Clicks Score (PT) 19  -MD     Highest level of mobility 6 --> Walked 10 steps or more  -MD     Row Name 10/18/22 1417          Functional Assessment    Outcome Measure Options AM-PAC 6 Clicks Basic Mobility (PT)  -MD           User Key  (r) = Recorded By, (t) = Taken By, (c) = Cosigned By    Initials Name Provider Type    Chelo Pruitt, PT Physical Therapist                             Physical Therapy Education     Title: PT OT SLP Therapies (In Progress)     Topic: Physical Therapy (Not Started)     Point: Mobility training (Done)     Learning Progress Summary           Patient Acceptance, E, VU by TOMY at 10/17/2022 1233    Comment: education on scd's, IS, weaning o2, fall risk, nonpharmological interventions for pain management.                   Point: Home exercise program (Not Started)     Learner Progress:  Not documented in this visit.          Point: Body mechanics (Not Started)     Learner Progress:  Not documented in this visit.          Point: Precautions (Done)     Learning Progress Summary           Patient Acceptance, E, VU by MD at 10/18/2022 1418                               User Key     Initials Effective Dates Name Provider Type Discipline    MD 06/16/21 -  Chelo Tello, PT Physical Therapist PT    HD 01/03/21 -  Sarah Manzano, RN Registered Nurse Nurse              PT Recommendation and Plan     Outcome Evaluation: Pt is 66 yo female presenting w PNA and acute hypoxic resp. failure. Prior to admission pt was independent w mobility living at home w spouse.  At time of PT eval pt required CGA for  transfers and ambulation w/o AD.  Pt was able to ambulate approx 15' but was limited due to dyspnea on exertion.  Pt's O2 dropped to 86% following activity but recoverd to 91% after approx 1 min of rest in sitting.  Pt educated on importance of moving and getting out of bed.  PT advised pt to ambulate to restroom w nsg/nsg aide and to sit up in chair 3x daily.  Family and pt state understanding.  PT also encouraged use of IS.  Pt will benifit from skilled PT to address mobility deficits and increase safety and independence w functional mobility.  At this time PT is recommending home w assist and HHPT.     Time Calculation:    PT Charges     Row Name 10/18/22 1406             Time Calculation    Start Time 1351  -MD      Stop Time 1404  -MD      Time Calculation (min) 13 min  -MD      PT Received On 10/18/22  -MD      PT - Next Appointment 10/19/22  -MD      PT Goal Re-Cert Due Date 10/25/22  -MD            User Key  (r) = Recorded By, (t) = Taken By, (c) = Cosigned By    Initials Name Provider Type    Chelo Pruitt, PT Physical Therapist              Therapy Charges for Today     Code Description Service Date Service Provider Modifiers Qty    17510511481 HC PT EVAL LOW COMPLEXITY 2 10/18/2022 Chelo Tello, PT GP 1    10297374079 HC PT THER PROC EA 15 MIN 10/18/2022 Chelo Tello, PT GP 1        Patient was wearing a face mask during this therapy encounter. Therapist used appropriate personal protective equipment including mask and gloves.  Mask used was standard procedure mask. Appropriate PPE was worn during the entire therapy session. Hand hygiene was completed before and after therapy session. Patient is not in enhanced droplet precautions.         PT G-Codes  Outcome Measure Options: AM-PAC 6 Clicks Basic Mobility (PT)  AM-PAC 6 Clicks Score (PT): 19    Chelo Tello PT  10/18/2022

## 2022-10-18 NOTE — PLAN OF CARE
Goal Outcome Evaluation:              Outcome Evaluation: Pt is 66 yo female presenting w PNA and acute hypoxic resp. failure. Prior to admission pt was independent w mobility living at home w spouse.  At time of PT eval pt required CGA for transfers and ambulation w/o AD.  Pt was able to ambulate approx 15' but was limited due to dyspnea on exertion.  Pt's O2 dropped to 86% following activity but recoverd to 91% after approx 1 min of rest in sitting.  Pt educated on importance of moving and getting out of bed.  PT advised pt to ambulate to restroom w nsg/nsg aide and to sit up in chair 3x daily.  Family and pt state understanding.  PT also encouraged use of IS.  Pt will benifit from skilled PT to address mobility deficits and increase safety and independence w functional mobility.  At this time PT is recommending home w assist and HHPT.

## 2022-10-18 NOTE — PLAN OF CARE
Goal Outcome Evaluation:              Outcome Evaluation: VSS patient up to bedside commode independently. 2Lnc Norco 7.5mg given for pain. Maintain safety and continue to monitor.

## 2022-10-18 NOTE — CONSULTS
Group: [facility]         CONSULT NOTE    Patient Identification:    Paz Browne  67 y.o.  female  1955  [id number]            Patient Care Team:  Javan Martinez MD as PCP - General (Internal Medicine)  Javan Martinez MD as PCP - Family Medicine  Ag Melo Jr., MD as Consulting Physician (Pulmonary Disease)  Cleveland Devine MD as Consulting Physician (Gastroenterology)  Earnest Gan MD as Consulting Physician (Cardiology)  Javan Martinez MD Zhong, Wangjian, MD PhD as Consulting Physician (Hematology and Oncology)  Javan Martinez MD as Referring Physician (Internal Medicine)    Requesting physician:     Reason for Consultation: Acute on chronic respiratory failure, multifocal pneumonia    CC: Persistent cough and shortness of breath    History of Present Illness: Patient is a 67-year-old white female with a past medical history significant for severe COPD/emphysema, chronic hypoxic respiratory failure on 2 L nasal cannula, previous A. fib not on anticoagulation due to GI bleed, previous tissue aortic valve replacement, reflux disease who presented to the hospital with worsening cough and shortness of breath along with chest tightness and wheezing going on for the last few days.  Patient finished outpatient treatment with steroids and antibiotics 2 days ago but had recurrence of symptoms.  Does not have any chest pain or palpitations currently.  Patient was admitted to the hospital after being diagnosed with multifocal pneumonia based on a CT of the chest which I reviewed personally and started on broad-spectrum antibiotics and compress pneumonia work-up was done.  Patient started on steroids and bronchodilators as well.  We have been asked to assist in management of the patient    Patient states that she is feeling a little better but still not close to baseline and is very concerned about her breathing.  States that she sees Dr. Melo uses Advair disc as  well as Spiriva inhaler and is on Daliresp as well.  Has not smoked and is not exposed to any secondhand smoking.  Does not have any recent sick contacts that would report    I have reviewed the H&P as well as the notes from the other consultants taking care of the patient this admission as well as previous hospital notes (if any available) from our group physicians and summarized above    Objective     Review of Systems:  Constitutional: No fever, chills, weight loss or loss of appetite.   ENMT: No sinus congestion, postnasal drip, epistaxis, sore throat  Cardiovascular: No chest pain, palpitation or legs swelling.    Respiratory: No hemoptysis, orthopnea, PND.  Gastrointestinal: No constipation, diarrhea or abdominal pain   Neurology: No headache, focal weakness, numbness or dizziness.   Musculoskeletal: No joint stiffness or swelling.   Psychiatry: No agitation or behavioral changes  Lymphatic: No swollen neck glands.  Integumentary: No rash.    Past Medical History:  Past Medical History:   Diagnosis Date   • VAN (acute kidney injury) (HCC)    • Anemia    • Asthma    • Atrial flutter (HCC)     cardioversion   • Cataract    • Celiac artery stenosis (HCC)    • Chronic respiratory failure with hypoxia (HCC)    • Colon polyp    • COPD (chronic obstructive pulmonary disease) (HCC)    • GI bleed    • Hiatal hernia    • History of CHF (congestive heart failure)     due to MR   • History of home oxygen therapy     3 lpm NC   • History of mitral valve replacement with tissue graft    • Hyperlipidemia    • Hypertension    • Infectious viral hepatitis     B   • Intertrigo    • Long term (current) use of anticoagulants    • Mitral regurgitation     s/p tissue MVR   • PAF (paroxysmal atrial fibrillation) (HCC)     s/p MAZE   • Pneumonia    • Pulmonary hypertension (HCC)    • S/P TVR (tricuspid valve repair) 7/7/2016       Past Surgical History:  Past Surgical History:   Procedure Laterality Date   • CARDIAC CATHETERIZATION   09/01/2014    Right dominant systemt, normal coronary arteries.    • CARDIAC CATHETERIZATION Left 6/10/2016    Procedure: Cardiac catheterization;  Surgeon: Sergei Hall MD;  Location: [facility] CATH INVASIVE LOCATION;  Service:    • CARDIAC CATHETERIZATION N/A 6/10/2016    Procedure: Right Heart Cath;  Surgeon: Sergei Hall MD;  Location: [facility] CATH INVASIVE LOCATION;  Service:    • CATARACT EXTRACTION     • COLONOSCOPY     • COLONOSCOPY N/A 8/4/2017    Procedure: COLONOSCOPY TO CECUM/TI WITH POLYPECTOMY ( COLD BX);  Surgeon: Cleveland Devine MD;  Location: [facility] ENDOSCOPY;  Service:    • COLONOSCOPY N/A 8/10/2017    Procedure: COLONOSCOPY to cecum and TI with 2 clips placed at transverse;  Surgeon: Earnest PALOMO MD;  Location: [facility] ENDOSCOPY;  Service:    • COLONOSCOPY N/A 12/22/2017    Procedure: COLONOSCOPY INTO CECUM WITH COLD POLYPECTOMIES;  Surgeon: Cleveland Devine MD;  Location: [facility] ENDOSCOPY;  Service:    • COLONOSCOPY N/A 5/17/2021    Procedure: COLONOSCOPY to cecum;  Surgeon: Cleveland Devine MD;  Location: [facility] ENDOSCOPY;  Service: Gastroenterology;  Laterality: N/A;  Pre: Fe deficency anemia, h/x of polyps  Post: fair prep, normal   • ENDOSCOPY N/A 8/17/2017    Procedure: ESOPHAGOGASTRODUODENOSCOPY;  Surgeon: Porsha Ruby MD;  Location: [facility] ENDOSCOPY;  Service:    • ENDOSCOPY N/A 12/22/2017    Procedure: ESOPHAGOGASTRODUODENOSCOPY WITH BIOPSIES;  Surgeon: Cleveland Devine MD;  Location: [facility] ENDOSCOPY;  Service:    • ENDOSCOPY N/A 5/17/2021    Procedure: ESOPHAGOGASTRODUODENOSCOPY  with biopsies;  Surgeon: Cleveland Devine MD;  Location: [facility] ENDOSCOPY;  Service: Gastroenterology;  Laterality: N/A;  Pre: Fe deficency anemia, nausea, heme positive stool   Post: gastritis, sloughing of distal esophagus mucosa   • GALLBLADDER SURGERY     • HEMORRHOIDECTOMY     • HYSTERECTOMY     • KIDNEY SURGERY  04/22/2013    Stent placement   • MAZE PROCEDURE     • MITRAL VALVE REPLACEMENT     • TONSILLECTOMY     •  TRICUSPID VALVE REPLACEMENT          Home Meds:  Medications Prior to Admission   Medication Sig Dispense Refill Last Dose   • albuterol (PROVENTIL) (2.5 MG/3ML) 0.083% nebulizer solution USE 1 VIAL VIA NEBULIZER EVERY FOUR HOURS AS NEEDED FOR WHEEZING 150 mL 3 Patient Taking Differently   • albuterol sulfate  (90 Base) MCG/ACT inhaler Inhale 2 puffs Every 4 (Four) Hours As Needed for Wheezing. 18 g 3 Patient Taking Differently   • amLODIPine (NORVASC) 10 MG tablet TAKE 1 TABLET BY MOUTH EVERY DAY 90 tablet 1 10/16/2022   • aspirin 81 MG EC tablet Take 1 tablet by mouth Daily.   10/16/2022   • atorvastatin (LIPITOR) 40 MG tablet TAKE 1 TABLET BY MOUTH EVERY DAY 90 tablet 2 10/16/2022   • azithromycin (ZITHROMAX) 250 MG tablet Take 1 tablet by mouth daily for 4 days. 4 tablet 0 Patient Taking Differently   • benzonatate (TESSALON) 100 MG capsule TAKE 1 CAPSULE BY MOUTH 2 TIMES A DAY AS NEEDED FOR COUGH 180 capsule 1 Past Week   • butalbital-acetaminophen-caffeine (FIORICET, ESGIC) -40 MG per tablet Take 1 tablet by mouth Every 6 (Six) Hours As Needed for Headache. 4 tablet 0 Patient Taking Differently   • cyclobenzaprine (FLEXERIL) 10 MG tablet Take 1 tablet by mouth every night at bedtime. 90 tablet 3 10/16/2022   • doxycycline (VIBRAMYICN) 100 MG tablet Take 1 tablet by mouth 2 (Two) Times a Day. 20 tablet 0 Patient Taking Differently   • fluticasone-salmeterol (Advair Diskus) 250-50 MCG/DOSE DISKUS Inhale 1 puff 2 (Two) Times a Day. 1 each 0 10/16/2022   • furosemide (LASIX) 40 MG tablet Take 1 tablet by mouth 2 (Two) Times a Day. 60 tablet 3 10/16/2022   • guaiFENesin (MUCINEX) 600 MG 12 hr tablet Take 600 mg by mouth 2 (Two) Times a Day.   10/16/2022   • HYDROcodone-acetaminophen (NORCO) 5-325 MG per tablet Take 1 tablet by mouth Every 8 (Eight) Hours As Needed for Moderate Pain. Chronic pain medicine for low back pain 90 tablet 0 10/16/2022   • lisinopril (PRINIVIL,ZESTRIL) 40 MG tablet Take 40 mg  by mouth Daily.   10/16/2022   • loratadine (CLARITIN) 10 MG tablet Take 10 mg by mouth 2 (two) times a day.   10/16/2022   • Magnesium Oxide 400 (240 Mg) MG tablet TAKE 1 TABLET BY MOUTH EVERY DAY 90 tablet 1 Past Week   • meclizine (ANTIVERT) 25 MG tablet Take 1 tablet by mouth 3 (Three) Times a Day As Needed for Dizziness. prn 30 tablet 3 10/16/2022   • Multiple Vitamins-Minerals (MULTIVITAL) tablet Take 1 tablet by mouth Daily.   10/16/2022   • Nebulizer device 1 Units 4 (Four) Times a Day As Needed (Wheezing). J44.1 j20.8 1 each 0 10/16/2022   • O2 (OXYGEN) Inhale 3 L/min Continuous.   10/16/2022   • Omega-3 Fatty Acids (fish oil) 1000 MG capsule capsule Take  by mouth Every Night.   10/16/2022   • omeprazole (priLOSEC) 40 MG capsule TAKE 1 CAPSULE BY MOUTH EVERY DAY 90 capsule 1 10/16/2022   • ondansetron (ZOFRAN) 4 MG tablet Take 1 tablet by mouth Every 12 (Twelve) Hours As Needed for Nausea or Vomiting. 90 tablet 3 Past Week   • polyethylene glycol (MIRALAX) packet Take 17 g by mouth 2 (Two) Times a Day. (Patient taking differently: Take 17 g by mouth 2 (Two) Times a Day As Needed.)   Past Week   • potassium chloride 10 MEQ CR tablet Take 1 tablet by mouth Daily. 90 tablet 1 10/16/2022   • predniSONE (DELTASONE) 20 MG tablet 2 tablets by mouth daily for 5 days 10 tablet 0 Past Week   • rOPINIRole (REQUIP) 2 MG tablet TAKE 1 TABLET BY MOUTH EVERY NIGHT 90 tablet 2 10/16/2022   • sertraline (ZOLOFT) 100 MG tablet TAKE 1 TABLET BY MOUTH EVERY DAY 90 tablet 1 10/16/2022   • Sodium Chloride Flush (sodium chloride 0.9 % flush) 0.9 % solution    Patient Taking Differently   • Spiriva HandiHaler 18 MCG per inhalation capsule PLACE 1 CAPSULE INTO INHALER AND INHALE DAILY. *NEEDS TO MAKE APPT* 30 capsule 0 10/16/2022   • zaleplon (SONATA) 10 MG capsule Take 1 capsule by mouth Every Night. 30 capsule 3 10/15/2022   • carvedilol (COREG) 6.25 MG tablet TAKE 1 TABLET BY MOUTH TWICE A DAY WITH MEALS 180 tablet 2    •  potassium chloride 10 MEQ CR tablet Take 1 tablet by mouth Daily. 5 tablet 0    • roflumilast (DALIRESP) 500 MCG tablet tablet Take 1 tablet by mouth Daily. 90 tablet 0    • Symjepi 0.3 MG/0.3ML solution prefilled syringe           Allergies:  Allergies   Allergen Reactions   • Bupropion Itching   • Cephalexin Itching     Tolerated piperacillin/tazobactam, ampicillin, cefdinir, and ceftriaxone   • Metoprolol Itching       Social History:   Social History     Socioeconomic History   • Marital status:    • Number of children: 2   Tobacco Use   • Smoking status: Former     Packs/day: 3.00     Years: 54.00     Pack years: 162.00     Types: Cigarettes     Quit date: 2007     Years since quitting: 15.8   • Smokeless tobacco: Never   • Tobacco comments:     caffeine - tea or mt dew    Vaping Use   • Vaping Use: Never used   Substance and Sexual Activity   • Alcohol use: No   • Drug use: No   • Sexual activity: Defer       Family History:  Family History   Adopted: Yes   Problem Relation Age of Onset   • No Known Problems Mother    • No Known Problems Father        Physical Exam:  /72 (BP Location: Left arm, Patient Position: Lying)   Pulse 87   Temp 99.5 °F (37.5 °C) (Oral)   Resp 18   Wt 64.7 kg (142 lb 11.2 oz)   SpO2 90%   BMI 22.35 kg/m²    Body mass index is 22.35 kg/m². 90% 64.7 kg (142 lb 11.2 oz)    Vent Settings                                           Physical Exam     Constitutional: Middle-aged pt in bed, + acute respiratory distress, ++ accessory muscle use  Head: - NCAT  Eyes: No pallor, anicteric conjunctivae, EOMI.  ENMT:  Mallampati 3, no oral thrush. Moist MM.   NECK: Trachea midline, No thyromegaly, no palpable cervical lymphadenopathy  Heart: RRR, no murmur. No pedal edema   Lungs: RAKESH +, distant breath sounds and prolonged expiratory phase.  No wheezes/ crackles heard    Abdomen: Soft. No tenderness, guarding or rigidity. No palpable masses  Extremities: Extremities warm and well  perfused. No cyanosis/ clubbing  Neuro: Conscious, answers appropriately, no gross focal neuro deficits  Psych: Mood and affect appropriate    PPE recommended per [facility] infectious disease Isolation protocol for the current clinical scenario (as mentioned below) was followed.     LABS:  Results from last 7 days   Lab Units 10/18/22  0415 10/17/22  1746 10/17/22  0516 10/16/22  2038   SODIUM mmol/L 134*  --  141 139   POTASSIUM mmol/L 4.5 4.1 3.2* 3.0*   CHLORIDE mmol/L 103  --  102 97*   CO2 mmol/L 24.3  --  26.1 29.5*   BUN mg/dL 13  --  9 10   CREATININE mg/dL 1.05*  --  0.73 0.82   CALCIUM mg/dL 9.0  --  9.0 9.6   BILIRUBIN mg/dL  --   --   --  1.1   ALK PHOS U/L  --   --   --  99   ALT (SGPT) U/L  --   --   --  12   AST (SGOT) U/L  --   --   --  17   GLUCOSE mg/dL 105*  --  107* 120*   WBC 10*3/mm3 13.78*  --  12.01* 17.75*   HEMOGLOBIN g/dL 9.2*  --  10.1* 12.2   PLATELETS 10*3/mm3 244  --  199 268   PROBNP pg/mL  --   --  941.0*  --    PROCALCITONIN ng/mL  --   --   --  0.06       Lab Results   Component Value Date    CALCIUM 9.0 10/18/2022    PHOS 3.4 06/18/2021       Results from last 7 days   Lab Units 10/17/22  0856 10/17/22  0516 10/16/22  2109   BLOODCX   --  No growth at 24 hours  No growth at 24 hours No growth at 24 hours  No growth at 24 hours   RESPCX  Rare The culture consists of normal respiratory juan daniel. This is a preliminary report; final report to follow.  --   --               Result Review      I have personally reviewed the results from the time of this admission to 10/18/2022 14:03 EDT and agree with these findings:  [x]  Laboratory  [x]  Microbiology  [x]  Radiology  []  EKG/Telemetry   [x]  Cardiology/Vascular   []  Pathology  []  Old records  []  Other:    Assessment   Acute on chronic hypoxic respiratory failure  Multifocal pneumonia  Acute COPD exacerbation- Dr. Melo  Pulmonary nodules - <5 mm; needs continued outpatient follow  Mediastinal lymphadenopathy > 1 cm; needs  outpatient follow-up  Severe bullous emphysema  Atrial fibrillation not on AC  S/p previous tissue AVR  Peripheral vascular disease  Hiatal hernia/reflux    RECOMMENDATIONS:  Patient's acute on chronic respiratory failure is likely from multifocal pneumonia in setting of COPD with obvious respiratory exacerbation and COPD exacerbation.  Agree with the current treatment plan of antibiotics and would narrow down as tolerated.  Procalcitonin is low and this could be prior infection for which she already got antibiotic as an outpatient but given high risk ok to finish a course.   Okay for SLP eval.  Pattern is not consistent with aspiration  Continue with steroids and bronchodilators for COPD exacerbation.   Lung nodules noted on scan along with some lymphadenopathy being worse could all be reactive to but needs follow-up imaging to confirm resolution given high risk for lung cancer.  Recommended to be out of bed  Guarded prognosis    Patient has very little functioning lung and is a high risk for recurrent respiratory failure unfortunately.  Previously did have significant eosinophilia and labs this time not accurate as patient has gotten prednisone prior to admission.  Might need evaluation for coexistent asthma.  Also will need to discharge her on scheduled DuoNeb and Pulmicort as I do not think inhalers are working anymore    Thank you for letting me participate in the care of this pleasant patient.  I have discussed my findings and recommendations with patient.     Elfego Holm MD  10/18/2022  14:03 EDT

## 2022-10-19 ENCOUNTER — READMISSION MANAGEMENT (OUTPATIENT)
Dept: CALL CENTER | Facility: HOSPITAL | Age: 67
End: 2022-10-19

## 2022-10-19 VITALS
OXYGEN SATURATION: 100 % | WEIGHT: 144.5 LBS | HEART RATE: 65 BPM | BODY MASS INDEX: 22.63 KG/M2 | TEMPERATURE: 97.5 F | DIASTOLIC BLOOD PRESSURE: 61 MMHG | RESPIRATION RATE: 18 BRPM | SYSTOLIC BLOOD PRESSURE: 128 MMHG

## 2022-10-19 PROBLEM — J96.10 CHRONIC RESPIRATORY FAILURE: Status: ACTIVE | Noted: 2022-10-19

## 2022-10-19 PROBLEM — J44.1 COPD WITH ACUTE EXACERBATION: Status: ACTIVE | Noted: 2022-10-19

## 2022-10-19 LAB
ANION GAP SERPL CALCULATED.3IONS-SCNC: 10 MMOL/L (ref 5–15)
BACTERIA SPEC RESP CULT: NORMAL
BUN SERPL-MCNC: 18 MG/DL (ref 8–23)
BUN/CREAT SERPL: 18.2 (ref 7–25)
CALCIUM SPEC-SCNC: 9.2 MG/DL (ref 8.6–10.5)
CHLORIDE SERPL-SCNC: 99 MMOL/L (ref 98–107)
CO2 SERPL-SCNC: 28 MMOL/L (ref 22–29)
CREAT SERPL-MCNC: 0.99 MG/DL (ref 0.57–1)
DEPRECATED RDW RBC AUTO: 40.8 FL (ref 37–54)
EGFRCR SERPLBLD CKD-EPI 2021: 61.9 ML/MIN/1.73
ERYTHROCYTE [DISTWIDTH] IN BLOOD BY AUTOMATED COUNT: 12.8 % (ref 12.3–15.4)
GLUCOSE SERPL-MCNC: 116 MG/DL (ref 65–99)
GRAM STN SPEC: NORMAL
HCT VFR BLD AUTO: 29.1 % (ref 34–46.6)
HGB BLD-MCNC: 9.3 G/DL (ref 12–15.9)
MCH RBC QN AUTO: 28 PG (ref 26.6–33)
MCHC RBC AUTO-ENTMCNC: 32 G/DL (ref 31.5–35.7)
MCV RBC AUTO: 87.7 FL (ref 79–97)
PLATELET # BLD AUTO: 241 10*3/MM3 (ref 140–450)
PMV BLD AUTO: 10.8 FL (ref 6–12)
POTASSIUM SERPL-SCNC: 4.5 MMOL/L (ref 3.5–5.2)
RBC # BLD AUTO: 3.32 10*6/MM3 (ref 3.77–5.28)
SODIUM SERPL-SCNC: 137 MMOL/L (ref 136–145)
WBC NRBC COR # BLD: 11.32 10*3/MM3 (ref 3.4–10.8)

## 2022-10-19 PROCEDURE — 85027 COMPLETE CBC AUTOMATED: CPT | Performed by: STUDENT IN AN ORGANIZED HEALTH CARE EDUCATION/TRAINING PROGRAM

## 2022-10-19 PROCEDURE — 94799 UNLISTED PULMONARY SVC/PX: CPT

## 2022-10-19 PROCEDURE — 94760 N-INVAS EAR/PLS OXIMETRY 1: CPT

## 2022-10-19 PROCEDURE — 80048 BASIC METABOLIC PNL TOTAL CA: CPT | Performed by: STUDENT IN AN ORGANIZED HEALTH CARE EDUCATION/TRAINING PROGRAM

## 2022-10-19 PROCEDURE — 63710000001 PREDNISONE PER 1 MG: Performed by: STUDENT IN AN ORGANIZED HEALTH CARE EDUCATION/TRAINING PROGRAM

## 2022-10-19 PROCEDURE — 94761 N-INVAS EAR/PLS OXIMETRY MLT: CPT

## 2022-10-19 PROCEDURE — 97110 THERAPEUTIC EXERCISES: CPT

## 2022-10-19 PROCEDURE — 94664 DEMO&/EVAL PT USE INHALER: CPT

## 2022-10-19 RX ORDER — IPRATROPIUM BROMIDE AND ALBUTEROL SULFATE 2.5; .5 MG/3ML; MG/3ML
3 SOLUTION RESPIRATORY (INHALATION)
Qty: 270 ML | Refills: 0 | Status: SHIPPED | OUTPATIENT
Start: 2022-10-19 | End: 2022-12-09 | Stop reason: SDUPTHER

## 2022-10-19 RX ORDER — BUDESONIDE 0.5 MG/2ML
0.5 INHALANT ORAL
Qty: 120 ML | Refills: 0 | Status: SHIPPED | OUTPATIENT
Start: 2022-10-19 | End: 2022-10-19 | Stop reason: SDUPTHER

## 2022-10-19 RX ORDER — PREDNISONE 20 MG/1
40 TABLET ORAL
Qty: 6 TABLET | Refills: 0 | Status: SHIPPED | OUTPATIENT
Start: 2022-10-21 | End: 2022-10-24

## 2022-10-19 RX ORDER — IPRATROPIUM BROMIDE AND ALBUTEROL SULFATE 2.5; .5 MG/3ML; MG/3ML
3 SOLUTION RESPIRATORY (INHALATION)
Qty: 270 ML | Refills: 0 | Status: SHIPPED | OUTPATIENT
Start: 2022-10-19 | End: 2022-10-19 | Stop reason: SDUPTHER

## 2022-10-19 RX ORDER — IPRATROPIUM BROMIDE AND ALBUTEROL SULFATE 2.5; .5 MG/3ML; MG/3ML
3 SOLUTION RESPIRATORY (INHALATION)
Status: DISCONTINUED | OUTPATIENT
Start: 2022-10-19 | End: 2022-10-19 | Stop reason: SDUPTHER

## 2022-10-19 RX ORDER — BUDESONIDE 0.5 MG/2ML
0.5 INHALANT ORAL
Qty: 120 ML | Refills: 0 | Status: SHIPPED | OUTPATIENT
Start: 2022-10-19 | End: 2022-12-09 | Stop reason: SDUPTHER

## 2022-10-19 RX ORDER — AZITHROMYCIN 250 MG/1
250 TABLET, FILM COATED ORAL DAILY
Qty: 2 TABLET | Refills: 0 | Status: SHIPPED | OUTPATIENT
Start: 2022-10-20 | End: 2022-10-26

## 2022-10-19 RX ORDER — AMOXICILLIN AND CLAVULANATE POTASSIUM 875; 125 MG/1; MG/1
1 TABLET, FILM COATED ORAL 2 TIMES DAILY
Qty: 4 TABLET | Refills: 0 | Status: SHIPPED | OUTPATIENT
Start: 2022-10-20 | End: 2022-10-22

## 2022-10-19 RX ADMIN — FUROSEMIDE 40 MG: 40 TABLET ORAL at 09:03

## 2022-10-19 RX ADMIN — LISINOPRIL 40 MG: 40 TABLET ORAL at 09:02

## 2022-10-19 RX ADMIN — ATORVASTATIN CALCIUM 40 MG: 20 TABLET, FILM COATED ORAL at 09:03

## 2022-10-19 RX ADMIN — POTASSIUM CHLORIDE 10 MEQ: 750 TABLET, EXTENDED RELEASE ORAL at 09:02

## 2022-10-19 RX ADMIN — ROFLUMILAST 500 MCG: 500 TABLET ORAL at 09:03

## 2022-10-19 RX ADMIN — GUAIFENESIN 600 MG: 600 TABLET, EXTENDED RELEASE ORAL at 09:03

## 2022-10-19 RX ADMIN — HYDROCODONE BITARTRATE AND ACETAMINOPHEN 1 TABLET: 7.5; 325 TABLET ORAL at 04:13

## 2022-10-19 RX ADMIN — CARVEDILOL 6.25 MG: 6.25 TABLET, FILM COATED ORAL at 09:03

## 2022-10-19 RX ADMIN — BENZONATATE 200 MG: 100 CAPSULE ORAL at 09:02

## 2022-10-19 RX ADMIN — CARVEDILOL 6.25 MG: 6.25 TABLET, FILM COATED ORAL at 17:43

## 2022-10-19 RX ADMIN — ASPIRIN 81 MG: 81 TABLET, FILM COATED ORAL at 09:02

## 2022-10-19 RX ADMIN — HYDROCODONE BITARTRATE AND ACETAMINOPHEN 1 TABLET: 7.5; 325 TABLET ORAL at 14:34

## 2022-10-19 RX ADMIN — MAGNESIUM OXIDE 400 MG (241.3 MG MAGNESIUM) TABLET 400 MG: TABLET at 09:03

## 2022-10-19 RX ADMIN — AMLODIPINE BESYLATE 10 MG: 10 TABLET ORAL at 09:03

## 2022-10-19 RX ADMIN — CETIRIZINE HYDROCHLORIDE 10 MG: 10 TABLET ORAL at 09:02

## 2022-10-19 RX ADMIN — BUDESONIDE 0.5 MG: 0.5 INHALANT ORAL at 08:05

## 2022-10-19 RX ADMIN — PANTOPRAZOLE SODIUM 40 MG: 40 TABLET, DELAYED RELEASE ORAL at 17:43

## 2022-10-19 RX ADMIN — PANTOPRAZOLE SODIUM 40 MG: 40 TABLET, DELAYED RELEASE ORAL at 06:32

## 2022-10-19 RX ADMIN — IPRATROPIUM BROMIDE AND ALBUTEROL SULFATE 3 ML: 2.5; .5 SOLUTION RESPIRATORY (INHALATION) at 08:05

## 2022-10-19 RX ADMIN — PREDNISONE 40 MG: 20 TABLET ORAL at 09:02

## 2022-10-19 RX ADMIN — IPRATROPIUM BROMIDE AND ALBUTEROL SULFATE 3 ML: 2.5; .5 SOLUTION RESPIRATORY (INHALATION) at 14:40

## 2022-10-19 RX ADMIN — SERTRALINE 100 MG: 100 TABLET, FILM COATED ORAL at 09:03

## 2022-10-19 RX ADMIN — Medication 10 ML: at 09:03

## 2022-10-19 NOTE — PLAN OF CARE
Goal Outcome Evaluation:  Plan of Care Reviewed With: patient        Progress: improving  Outcome Evaluation: Patient pleasant and agreeable to PT this afternoon- increased ambulation distance to 85' SBA-CGA without an AD today. 2L supplemental O2 donned. SOA still noted but overall functional mobility improved. Will continue to advance as able. Encouraged patient to continue to ambulate 3x/day.

## 2022-10-19 NOTE — CASE MANAGEMENT/SOCIAL WORK
Case Management Discharge Note      Final Note: Home with family assist. [employer] to deliver nebulizer         Selected Continued Care - Admitted Since 10/16/2022     Destination    No services have been selected for the patient.              Durable Medical Equipment Coordination complete.    Service Provider Selected Services Address Phone Fax Patient Preferred    [facility] [id number] [place] [id number] [phone] --          Dialysis/Infusion    No services have been selected for the patient.              Home Medical Care    No services have been selected for the patient.              Therapy    No services have been selected for the patient.              Community Resources    No services have been selected for the patient.              Community & DME    No services have been selected for the patient.                  Transportation Services  Private: Car    Final Discharge Disposition Code: 01 - home or self-care

## 2022-10-19 NOTE — PROGRESS NOTES
Elfego Holm MD                          [phone]            Patient ID:    Name:  Paz Browne    MRN:  [id number]    1955   67 y.o.  female            Patient Care Team:  Javan Martinez MD as PCP - General (Internal Medicine)  Javan Martinez MD as PCP - Family Medicine  Ag Melo Jr., MD as Consulting Physician (Pulmonary Disease)  Cleveland Devine MD as Consulting Physician (Gastroenterology)  Earnest Gan MD as Consulting Physician (Cardiology)  Javan Martinez MD Zhong, Wangjian, MD PhD as Consulting Physician (Hematology and Oncology)  Javan Martinez MD as Referring Physician (Internal Medicine)    CC/ Reason for visit: Acute on chronic hypoxic respiratory failure, COPD exacerbation, pneumonia    Subjective: Pt seen and examined this AM. No acute overnight events noted. Doing better.  States that she is feeling much better and is really happy with the progress.  States that her nebulizer is not working at home and she is wondering if inhalers are working any more.    ROS: Denies any subjective fevers, syncope or presyncopal events, new neurological deficits, nausea or vomiting currently    Objective     Vital Signs past 24hrs    BP range: BP: (110-128)/(39-64) 128/61  Pulse range: Heart Rate:  [59-74] 65  Resp rate range: Resp:  [18-20] 18  Temp range: Temp (24hrs), Av.5 °F (36.4 °C), Min:97.3 °F (36.3 °C), Max:97.7 °F (36.5 °C)      Ventilator/Non-Invasive Ventilation Settings (From admission, onward)    None          Vent Settings                                         Device (Oxygen Therapy): nasal cannula       65.5 kg (144 lb 8 oz); Body mass index is 22.63 kg/m².      Intake/Output Summary (Last 24 hours) at 10/19/2022 1414  Last data filed at 10/18/2022 2135  Gross per 24 hour   Intake --   Output 1 ml   Net -1 ml       PHYSICAL EXAM   Constitutional: Middle-aged pt in bed, + acute respiratory distress, ++ accessory muscle  use  Head: - NCAT  Eyes: No pallor, anicteric conjunctivae, EOMI.  ENMT:  Mallampati 3, no oral thrush. Moist MM.   NECK: Trachea midline, No thyromegaly, no palpable cervical lymphadenopathy  Heart: RRR, no murmur. No pedal edema   Lungs: RAKESH +, distant breath sounds and prolonged expiratory phase.  No wheezes/ crackles heard    Abdomen: Soft. No tenderness, guarding or rigidity. No palpable masses  Extremities: Extremities warm and well perfused. No cyanosis/ clubbing  Neuro: Conscious, answers appropriately, no gross focal neuro deficits  Psych: Mood and affect appropriate    PPE recommended per [facility] infectious disease Isolation protocol for the current clinical scenario (as mentioned below) was followed.     Scheduled meds:  amLODIPine, 10 mg, Oral, Daily  aspirin, 81 mg, Oral, Daily  atorvastatin, 40 mg, Oral, Daily  budesonide, 0.5 mg, Nebulization, BID - RT  carvedilol, 6.25 mg, Oral, BID With Meals  cefTRIAXone, 1 g, Intravenous, Q24H  cetirizine, 10 mg, Oral, Daily  cyclobenzaprine, 10 mg, Oral, Nightly  furosemide, 40 mg, Oral, BID  guaiFENesin, 600 mg, Oral, BID  ipratropium-albuterol, 3 mL, Nebulization, Q6H While Awake - RT  lisinopril, 40 mg, Oral, Daily  Magnesium Oxide, 1 tablet, Oral, Daily  pantoprazole, 40 mg, Oral, BID AC  polyethylene glycol, 17 g, Oral, Daily  potassium chloride, 10 mEq, Oral, Daily  predniSONE, 40 mg, Oral, Daily With Breakfast  roflumilast, 500 mcg, Oral, Daily  rOPINIRole, 2 mg, Oral, Nightly  sertraline, 100 mg, Oral, Daily  sodium chloride, 10 mL, Intravenous, Q12H  zolpidem, 5 mg, Oral, Nightly        IV meds:                           Data Review:      Results from last 7 days   Lab Units 10/19/22  0411 10/18/22  1620 10/18/22  0415 10/17/22  1746 10/17/22  0516 10/16/22  2038   SODIUM mmol/L 137  --  134*  --  141 139   POTASSIUM mmol/L 4.5  --  4.5 4.1 3.2* 3.0*   CHLORIDE mmol/L 99  --  103  --  102 97*   CO2 mmol/L 28.0  --  24.3  --  26.1 29.5*   BUN  mg/dL 18  --  13  --  9 10   CREATININE mg/dL 0.99  --  1.05*  --  0.73 0.82   CALCIUM mg/dL 9.2  --  9.0  --  9.0 9.6   BILIRUBIN mg/dL  --   --   --   --   --  1.1   ALK PHOS U/L  --   --   --   --   --  99   ALT (SGPT) U/L  --   --   --   --   --  12   AST (SGOT) U/L  --   --   --   --   --  17   GLUCOSE mg/dL 116*  --  105*  --  107* 120*   WBC 10*3/mm3 11.32*  --  13.78*  --  12.01* 17.75*   HEMOGLOBIN g/dL 9.3* 9.1* 9.2*  --  10.1* 12.2   PLATELETS 10*3/mm3 241  --  244  --  199 268   PROBNP pg/mL  --   --   --   --  941.0*  --    PROCALCITONIN ng/mL  --   --   --   --   --  0.06       Lab Results   Component Value Date    CALCIUM 9.2 10/19/2022    PHOS 3.4 06/18/2021       Results from last 7 days   Lab Units 10/17/22  0856 10/17/22  0516 10/16/22  2109   BLOODCX   --  No growth at 2 days  No growth at 2 days No growth at 2 days  No growth at 2 days   RESPCX  Rare Normal respiratory juan daniel. No S. aureus or Pseudomonas aeruginosa detected. Final report.  --   --               I have personally reviewed the results from the time of this admission to 10/19/2022 14:14 EDT and agree with these findings:  [x]  Laboratory  [x]  Microbiology  [x]  Radiology  []  EKG/Telemetry   [x]  Cardiology/Vascular   []  Pathology  []  Old records    Assessment    Acute on chronic hypoxic respiratory failure(2 L nasal cannula)    Multifocal pneumonia  Acute COPD exacerbation- Dr. Melo  Pulmonary nodules - <5 mm; needs continued outpatient follow  Mediastinal lymphadenopathy > 1 cm; needs outpatient follow-up  Severe bullous emphysema  Atrial fibrillation not on AC  S/p previous tissue AVR  Peripheral vascular disease  Hiatal hernia/reflux    PLAN:  Patient is doing much better from a respiratory standpoint and is on baseline supplemental oxygen currently and states that her breathing improved  Would finish her course of antibiotic for potential multifocal pneumonia even though it is likely from prior infection for which she  got treated already  Finish a short course of steroid  Lung nodules noted on scan along with some lymphadenopathy being worse could all be reactive to but needs follow-up imaging to confirm resolution given high risk for lung cancer.  Recommended to be out of bed  Guarded prognosis     Patient has very little functioning lung and is a high risk for recurrent respiratory failure unfortunately.  Previously did have significant eosinophilia and labs this time not accurate as patient has gotten prednisone prior to admission.  Might need evaluation for coexistent asthma.  Also will need to discharge her on scheduled DuoNeb and Pulmicort as I do not think inhalers are working anymore.  Patient states that she will need a new nebulizer at discharge as well and CCP to set it up.    Okay to discharge from my standpoint and follow-up with Dr. Melo in 2 to 4-week    Elfego Holm MD  10/19/2022

## 2022-10-19 NOTE — THERAPY TREATMENT NOTE
Patient Name: Paz Browne  : 1955    MRN: [id number]                              Today's Date: 10/19/2022       Admit Date: 10/16/2022    Visit Dx:     ICD-10-CM ICD-9-CM   1. Pneumonia of both lower lobes due to infectious organism  J18.9 486   2. Sepsis without acute organ dysfunction, due to unspecified organism ([facility])  A41.9 038.9     995.91   3. Leukocytosis, unspecified type  D72.829 288.60   4. Chronic obstructive pulmonary disease, unspecified COPD type ([facility])  J44.9 496   5. History of endocarditis  Z86.79 V12.59     Patient Active Problem List   Diagnosis   • PAF (paroxysmal atrial fibrillation) ([facility])   • Hypertension   • Hyperlipidemia   • Pain medication agreement   • Hiatal hernia   • Primary osteoarthritis involving multiple joints   • Pulmonary hypertension ([facility])   • S/P TVR (tricuspid valve repair)   • Pulmonary nodule   • Restless leg syndrome   • Chronic low back pain   • Dysplastic polyp of colon   • History of mitral valve replacement with tissue graft   • Chronic hypoxemic respiratory failure ([facility])   • Anemia   • Celiac artery stenosis ([facility])   • Intertrigo   • COPD (chronic obstructive pulmonary disease) ([facility])   • Abnormal EKG   • Muscle spasms of both lower extremities   • Iron deficiency anemia   • Adenomatous polyp of colon   • Gastroesophageal reflux disease without esophagitis   • Headache   • History of endocarditis   • Pneumonia of both lower lobes due to infectious organism   • Acute hypoxemic respiratory failure ([facility])   • COPD (chronic obstructive pulmonary disease) ([facility])   • Pulmonary nodule   • Peptic ulcer disease   • Peripheral vascular disease ([facility])   • Hyperlipidemia   • Hyperglycemia   • COPD with acute exacerbation ([facility])     Past Medical History:   Diagnosis Date   • VAN (acute kidney injury) ([facility])    • Anemia    • Asthma    • Atrial flutter ([facility])     cardioversion   • Cataract    • Celiac artery stenosis ([facility])    • Chronic respiratory failure with hypoxia ([facility])    •  Colon polyp    • COPD (chronic obstructive pulmonary disease) ([facility])    • GI bleed    • Hiatal hernia    • History of CHF (congestive heart failure)     due to MR   • History of home oxygen therapy     3 lpm NC   • History of mitral valve replacement with tissue graft    • Hyperlipidemia    • Hypertension    • Infectious viral hepatitis     B   • Intertrigo    • Long term (current) use of anticoagulants    • Mitral regurgitation     s/p tissue MVR   • PAF (paroxysmal atrial fibrillation) ([facility])     s/p MAZE   • Pneumonia    • Pulmonary hypertension ([facility])    • S/P TVR (tricuspid valve repair) 7/7/2016     Past Surgical History:   Procedure Laterality Date   • CARDIAC CATHETERIZATION  09/01/2014    Right dominant systemt, normal coronary arteries.    • CARDIAC CATHETERIZATION Left 6/10/2016    Procedure: Cardiac catheterization;  Surgeon: Sergei Hall MD;  Location: [facility] CATH INVASIVE LOCATION;  Service:    • CARDIAC CATHETERIZATION N/A 6/10/2016    Procedure: Right Heart Cath;  Surgeon: Sergei Hall MD;  Location: [facility] CATH INVASIVE LOCATION;  Service:    • CATARACT EXTRACTION     • COLONOSCOPY     • COLONOSCOPY N/A 8/4/2017    Procedure: COLONOSCOPY TO CECUM/TI WITH POLYPECTOMY ( COLD BX);  Surgeon: Cleveland Devine MD;  Location: [facility] ENDOSCOPY;  Service:    • COLONOSCOPY N/A 8/10/2017    Procedure: COLONOSCOPY to cecum and TI with 2 clips placed at transverse;  Surgeon: Earnest PALOMO MD;  Location: [facility] ENDOSCOPY;  Service:    • COLONOSCOPY N/A 12/22/2017    Procedure: COLONOSCOPY INTO CECUM WITH COLD POLYPECTOMIES;  Surgeon: Cleveland Devine MD;  Location: [facility] ENDOSCOPY;  Service:    • COLONOSCOPY N/A 5/17/2021    Procedure: COLONOSCOPY to cecum;  Surgeon: Cleveland Devine MD;  Location: [facility] ENDOSCOPY;  Service: Gastroenterology;  Laterality: N/A;  Pre: Fe deficency anemia, h/x of polyps  Post: fair prep, normal   • ENDOSCOPY N/A 8/17/2017    Procedure: ESOPHAGOGASTRODUODENOSCOPY;  Surgeon: Porsha Ruby,  MD;  Location: [facility] ENDOSCOPY;  Service:    • ENDOSCOPY N/A 12/22/2017    Procedure: ESOPHAGOGASTRODUODENOSCOPY WITH BIOPSIES;  Surgeon: Cleveland Devine MD;  Location: [facility] ENDOSCOPY;  Service:    • ENDOSCOPY N/A 5/17/2021    Procedure: ESOPHAGOGASTRODUODENOSCOPY  with biopsies;  Surgeon: Cleveland Devine MD;  Location: [facility] ENDOSCOPY;  Service: Gastroenterology;  Laterality: N/A;  Pre: Fe deficency anemia, nausea, heme positive stool   Post: gastritis, sloughing of distal esophagus mucosa   • GALLBLADDER SURGERY     • HEMORRHOIDECTOMY     • HYSTERECTOMY     • KIDNEY SURGERY  04/22/2013    Stent placement   • MAZE PROCEDURE     • MITRAL VALVE REPLACEMENT     • TONSILLECTOMY     • TRICUSPID VALVE REPLACEMENT        General Information     Row Name 10/19/22 1431          Physical Therapy Time and Intention    Document Type therapy note (daily note)  -MS     Mode of Treatment physical therapy  -MS     Row Name 10/19/22 1431          General Information    Existing Precautions/Restrictions fall;oxygen therapy device and L/min  -MS     Row Name 10/19/22 1431          Cognition    Orientation Status (Cognition) oriented x 3  -MS     Row Name 10/19/22 1431          Safety Issues, Functional Mobility    Comment, Safety Issues/Impairments (Mobility) Nonskid socks and gait belt donned.  -MS           User Key  (r) = Recorded By, (t) = Taken By, (c) = Cosigned By    Initials Name Provider Type    MS BermanDeena, PT Physical Therapist               Mobility     Row Name 10/19/22 1431          Bed Mobility    Bed Mobility supine-sit;sit-supine  -MS     Supine-Sit [place] (Bed Mobility) supervision  -MS     Sit-Supine [place] (Bed Mobility) supervision  -MS     Comment, (Bed Mobility) SV for sitting balance.  -MS     Row Name 10/19/22 1431          Sit-Stand Transfer    Sit-Stand [place] (Transfers) standby assist;verbal cues  -MS     Row Name 10/19/22 1431          Gait/Stairs (Locomotion)    [place]  Level (Gait) standby assist;contact guard;verbal cues  -MS     Distance in Feet (Gait) 85'  -MS     Deviations/Abnormal Patterns (Gait) festinating/shuffling;gait speed decreased;base of support, narrow;stride length decreased  -MS     Bilateral Gait Deviations forward flexed posture;decreased arm swing  -MS     Comment, (Gait/Stairs) 2L O2 donned, SOA, one standing rest break  -MS           User Key  (r) = Recorded By, (t) = Taken By, (c) = Cosigned By    Initials Name Provider Type    MS SextonsDeena, PT Physical Therapist               Obj/Interventions     Row Name 10/19/22 1432          Sensory Assessment (Somatosensory)    Sensory Assessment (Somatosensory) sensation intact  -MS           User Key  (r) = Recorded By, (t) = Taken By, (c) = Cosigned By    Initials Name Provider Type    MS SextonsDeena, PT Physical Therapist               Goals/Plan    No documentation.                Clinical Impression     Row Name 10/19/22 1433          Pain    Pretreatment Pain Rating 0/10 - no pain  -MS     Posttreatment Pain Rating 0/10 - no pain  -MS     Row Name 10/19/22 1433          Plan of Care Review    Plan of Care Reviewed With patient  -MS     Progress improving  -MS     Outcome Evaluation Patient pleasant and agreeable to PT this afternoon- increased ambulation distance to 85' SBA-CGA without an AD today. 2L supplemental O2 donned. SOA still noted but overall functional mobility improved. Will continue to advance as able. Encouraged patient to continue to ambulate 3x/day.  -MS     Row Name 10/19/22 1433          Therapy Assessment/Plan (PT)    Therapy Frequency (PT) 5 times/wk  -MS     Row Name 10/19/22 1433          Positioning and Restraints    Pre-Treatment Position in bed  -MS     Post Treatment Position bed  -MS     In Bed fowlers;call light within reach;encouraged to call for assist;with family/caregiver  no exit alarm upon PT arrival  -MS           User Key  (r) = Recorded By, (t) = Taken By,  (c) = Cosigned By    Initials Name Provider Type    MS BermanDeena, PT Physical Therapist               Outcome Measures     Row Name 10/19/22 1435          How much help from another person do you currently need...    Turning from your back to your side while in flat bed without using bedrails? 4  -MS     Moving from lying on back to sitting on the side of a flat bed without bedrails? 4  -MS     Moving to and from a bed to a chair (including a wheelchair)? 3  -MS     Standing up from a chair using your arms (e.g., wheelchair, bedside chair)? 3  -MS     Climbing 3-5 steps with a railing? 3  -MS     To walk in hospital room? 3  -MS     AM-PAC 6 Clicks Score (PT) 20  -MS     Highest level of mobility 6 --> Walked 10 steps or more  -MS           User Key  (r) = Recorded By, (t) = Taken By, (c) = Cosigned By    Initials Name Provider Type    MS BermanDeena, PT Physical Therapist                             Physical Therapy Education     Title: PT OT SLP Therapies (In Progress)     Topic: Physical Therapy (Not Started)     Point: Mobility training (Done)     Learning Progress Summary           Patient Acceptance, E,TB, VU by MS at 10/19/2022 1436    Acceptance, E, VU by TOMY at 10/17/2022 1233    Comment: education on scd's, IS, weaning o2, fall risk, nonpharmological interventions for pain management.                   Point: Home exercise program (Not Started)     Learner Progress:  Not documented in this visit.          Point: Body mechanics (Done)     Learning Progress Summary           Patient Acceptance, E,TB, VU by MS at 10/19/2022 1436                   Point: Precautions (Done)     Learning Progress Summary           Patient Acceptance, E,TB, VU by MS at 10/19/2022 1436    Acceptance, E, VU by MD at 10/18/2022 1418                               User Key     Initials Effective Dates Name Provider Type Discipline    MD 06/16/21 -  Chelo Tello, PT Physical Therapist PT    MS 06/16/21 -  Deena Berman  JESSEE PT Physical Therapist PT    HD 01/03/21 -  Sarah Manzano, RN Registered Nurse Nurse              PT Recommendation and Plan     Plan of Care Reviewed With: patient  Progress: improving  Outcome Evaluation: Patient pleasant and agreeable to PT this afternoon- increased ambulation distance to 85' SBA-CGA without an AD today. 2L supplemental O2 donned. SOA still noted but overall functional mobility improved. Will continue to advance as able. Encouraged patient to continue to ambulate 3x/day.     Time Calculation:    PT Charges     Row Name 10/19/22 1430             Time Calculation    Start Time 1414  -MS      Stop Time 1426  -MS      Time Calculation (min) 12 min  -MS      PT Received On 10/19/22  -MS      PT - Next Appointment 10/20/22  -MS            User Key  (r) = Recorded By, (t) = Taken By, (c) = Cosigned By    Initials Name Provider Type    Deena Bauer, PT Physical Therapist              Therapy Charges for Today     Code Description Service Date Service Provider Modifiers Qty    [id number] HC PT THER PROC EA 15 MIN 10/19/2022 Deena Berman PT GP 1          PT G-Codes  Outcome Measure Options: AM-PAC 6 Clicks Basic Mobility (PT)  AM-PAC 6 Clicks Score (PT): 20    Deena Berman PT  10/19/2022

## 2022-10-19 NOTE — OUTREACH NOTE
Prep Survey    Flowsheet Row Responses   [facility] patient discharged from? [facility]   Is LACE score < 7 ? No   Emergency Room discharge w/ pulse ox? No   Eligibility [facility]   Date of Admission 10/16/22   Date of Discharge 10/19/22   Discharge Disposition Home or Self Care   Discharge diagnosis COPD with acute exacerbation,   pneumonia   Does the patient have one of the following disease processes/diagnoses(primary or secondary)? Pneumonia   Does the patient have Home health ordered? No   Is there a DME ordered? Yes   What DME was ordered? [employer] to deliver nebulizer   Prep survey completed? Yes          FAZAL MCGOVERN - Registered Nurse

## 2022-10-19 NOTE — PLAN OF CARE
Goal Outcome Evaluation:  Pt rested well after HS meds given. Pt only required lortab twice for mid back pain.

## 2022-10-19 NOTE — DISCHARGE SUMMARY
Patient Name: Paz Browne  : 1955  MRN: [id number]    Date of Admission: 10/16/2022  Date of Discharge:  10/19/2022  Primary Care Physician: Javan Martinez MD      Chief Complaint:   Shortness of Breath and Flank Pain      Discharge Diagnoses     Active Hospital Problems    Diagnosis  POA   • **Acute hypoxemic respiratory failure (HCC) [J96.01]  Yes   • COPD with acute exacerbation (HCC) [J44.1]  Unknown   • Chronic respiratory failure (HCC) [J96.10]  Yes   • COPD (chronic obstructive pulmonary disease) (HCC) [J44.9]  Yes   • Pulmonary nodule [R91.1]  Yes   • Peptic ulcer disease [K27.9]  Yes   • Peripheral vascular disease (HCC) [I73.9]  Yes   • Hyperlipidemia [E78.5]  Yes   • Hyperglycemia [R73.9]  Yes   • Pneumonia of both lower lobes due to infectious organism [J18.9]  Yes   • Chronic hypoxemic respiratory failure (HCC) [J96.11]  Yes   • Restless leg syndrome [G25.81]  Yes      Resolved Hospital Problems    Diagnosis Date Resolved POA   • Hypokalemia [E87.6] 10/18/2022 Yes        Hospital Course     A pleasant 67 years old white female excess smoker with a past history of oxygen dependent COPD and chronic hypoxemic respiratory failure on home oxygen/hypertension/A. fib/status post aortic valve replacement (tissue valve)/hiatal hernia/GERD/generalized osteoarthritis/celiac artery stenosis/dyslipidemia/hyperlipidemIA/restless leg syndrome/iron deficiency anemia who presented to the emergency department with progressive shortness of breath associated with increasing cough and wheeze over the 2 days.  Chest x-ray showed patchy infiltrates at the lung bases bilaterally.  Patient was diagnosed with multifocal pneumonia  and COPD exacerbation.  Started on DuoNebs, Pulmicort nebulizers, prednisone, IV ceftriaxone and IV azithromycin for pneumonia.  Pulmonology was consulted.  Pulmonology commended to discharge patient home on duo nebs and Pulmicort nebulizers as her current inhalers might not be  working anymore, patient stated that she has been taking them as prescribed.  OrderedPulmicort and DuoNeb nebulizers at discharge.  Ordered new nebulizer as well.  In addition discharged home on Augmentin and azithromycin for 2 more days to complete 5-day course of antibiotics for pneumonia.  Follow-up with outpatient pulmonology.       Pulmonary nodule/ mediastinal lymphadenopathy : Seen on CT.  Discussed with patient, needs repeat follow-up imaging for surveillance.  Follow-up with pulmonology as scheduled to discuss when repeat imaging should be obtained.  Likely in 3-6 months.      At the time of discharge patient was told to take all medications as prescribed, keep all follow-up appointments, and call their doctor or return to the hospital with any worsening or concerning symptoms.         Day of Discharge     Subjective:  Patient sitting up in recliner.  Feeling much better today.  Still has shortness of breath but significantly improved.  Close to baseline.  No chest pain, fevers or chills.  Intermittent cough, improving.  No chest pain, fevers or chills.    Physical Exam:  Temp:  [97.3 °F (36.3 °C)-97.7 °F (36.5 °C)] 97.5 °F (36.4 °C)  Heart Rate:  [59-74] 65  Resp:  [18-20] 18  BP: (110-128)/(39-64) 128/61  Body mass index is 22.63 kg/m².  Physical Exam    General: Alert and oriented x3, no acute distress, chronically ill-appearing  HEENT: Normocephalic, atraumatic  CV: Regular rate and rhythm, no murmurs rubs or gallops  Lungs: Decreased bilateral,, mild expiratory wheezing, on 2 L nasal cannula  Abdomen: Soft, nontender, nondistended  Extremities: No significant peripheral edema , no cyanosis     Consultants     Consult Orders (all) (From admission, onward)     Start     Ordered    10/18/22 1004  Inpatient Pulmonology Consult  Once        Specialty:  Pulmonary Disease  Provider:  Ag Melo Jr., MD    10/18/22 1003    10/16/22 2217  [facility] (on-call MD unless specified) Details  Once         Specialty:  Hospitalist  Provider:  Ovi Chavez MD    10/16/22 2216              Procedures     * Surgery not found *      Imaging Results (All)     Procedure Component Value Units Date/Time    CT Chest With Contrast Diagnostic [[id number]] Collected: 10/17/22 1139     Updated: 10/17/22 1400    Narrative:      CT CHEST WITH IV CONTRAST     HISTORY: Possible sepsis, COPD, pulmonary nodules, pneumonia.     TECHNIQUE: Radiation dose reduction techniques were utilized, including  automated exposure control and exposure modulation based on body size.   3 mm images were obtained through the chest after the administration of  IV contrast.      COMPARISON: 10/15/2022, 10/27/2021     FINDINGS:     Thoracic inlet: Artifact from multiple overlying leads/motion.  Heterogeneous thyroid with stable hypodensities in the left lobe.     Heart and great vessels: Status post sternotomy. Cardiomegaly. Extensive  atherosclerosis thoracoabdominal aorta and branch vessels. Dense  calcification about the mitral annulus. No significant pericardial  effusion.     Lymphatics: Mediastinal adenopathy with an index 1 cm precarinal node, 1  cm subcarinal node, and mildly prominent 1.1 cm right hilar node.  Previously the subcarinal node approximated 5 mm in short axis.     Lung parenchyma and pleural space: Patent central airway. Presumed  diverticulum along the right posterior trachea similar to the prior.  Severe emphysema with pleural and parenchymal scarring. Nodular  thickening about the right apex similar in appearance to the prior.  Mosaic attenuation of the lungs with air trapping. New masslike  consolidation in the periphery of the right lower lobe and patchy  groundglass opacities in the right middle lobe, particularly the lateral  segment. Additional reticulonodular opacities are present in the lingula  and left lower lobe but to a lesser degree. No effusions. No  pneumothorax. Calcified granulomas. There are a few scattered  sub-5 mm  not definitely calcified nodules, some of which are more conspicuous  from the prior.     Upper abdomen: Cholecystectomy. Calcified granuloma spleen. Mild  prominence of the pancreatic duct measuring 4 mm similar to the prior.  Please refer to the prior CT abdomen for further detail.     Bone windows: Hypertrophic degenerative changes are again noted in  thoracolumbar spine.       Impression:      1. Multifocal pneumonia with more masslike consolidation in the right  lower lobe. Recommend attention on short-term follow-up to resolution in  2-3 months after treatment given background emphysema.  2. Mildly prominent mediastinal nodes likely reactive but can be  followed on subsequent surveillance.  3. Please see above for additional findings.     This report was finalized on 10/17/2022 1:57 PM by Dr. Silvia Goetz M.D.       XR Chest 1 View [[id number]] Collected: 10/16/22 2149     Updated: 10/16/22 2153    Narrative:      SINGLE VIEW OF THE CHEST     HISTORY: Fever and shortness of air     COMPARISON: 09/18/2022     FINDINGS:  Heart size is within normal limits for technique. Patient is status post  median sternotomy. Advanced background changes of COPD are seen. Patient  is suspected to have some patchy infiltrate at the lung bases  bilaterally. No pneumothorax or pleural effusion is seen.       Impression:      Suspected patchy infiltrates at the lung bases bilaterally. Pneumonia is  not excluded.     This report was finalized on 10/16/2022 9:50 PM by Dr. Leigh Parr M.D.           Results for orders placed during the hospital encounter of 08/09/17    Duplex Mesenteric Complete CAR    Interpretation Summary  · Greater than 70% stenosis of the celiac artery is noted.  · No hemodynamically significant stenosis of the superior mesenteric artery (SMA) is noted.    Results for orders placed during the hospital encounter of 02/16/22    Adult Transthoracic Echo Complete W/ Cont if Necessary Per  Protocol    Interpretation Summary  · Calculated left ventricular EF = 54.8% Estimated left ventricular EF was in agreement with the calculated left ventricular EF. Left ventricular systolic function is normal. Septal wall motion is abnormal, consistent with a post-operative state. Normal left ventricular cavity size noted. Left ventricular wall thickness is consistent with mild concentric hypertrophy. Left ventricular diastolic function was indeterminate.  · The right ventricular cavity is mildly dilated. Normal right ventricular systolic function noted.  · The left atrial cavity is mildly dilated.  · Right atrium not well visualized.  · The aortic valve is abnormal in structure. There is mild to moderate thickening of the aortic valve. The aortic valve appears trileaflet. Trace aortic valve regurgitation is present. No hemodynamically significant aortic valve stenosis is present.  · No significant mitral valve regurgitation is present. There is a 31 mm, porcine, [employer] bioprosthetic mitral valve present. The mitral valve peak and mean gradients are within defined limits. The prosthetic mitral valve is grossly normal.  · Mild to moderate tricuspid valve regurgitation is present. Estimated right ventricular systolic pressure from tricuspid regurgitation is moderately elevated (45-55 mmHg). Calculated right ventricular systolic pressure from tricuspid regurgitation is 48.0 mmHg. No evidence of significant tricuspid valve stenosis is present. There is a tricuspid ring valve present.    Pertinent Labs     Results from last 7 days   Lab Units 10/19/22  0411 10/18/22  1620 10/18/22  0415 10/17/22  0516 10/16/22  2038   WBC 10*3/mm3 11.32*  --  13.78* 12.01* 17.75*   HEMOGLOBIN g/dL 9.3* 9.1* 9.2* 10.1* 12.2   PLATELETS 10*3/mm3 241  --  244 199 268     Results from last 7 days   Lab Units 10/19/22  0411 10/18/22  0415 10/17/22  1746 10/17/22  0516 10/16/22  2038   SODIUM mmol/L 137 134*  --  141 139   POTASSIUM mmol/L  4.5 4.5 4.1 3.2* 3.0*   CHLORIDE mmol/L 99 103  --  102 97*   CO2 mmol/L 28.0 24.3  --  26.1 29.5*   BUN mg/dL 18 13  --  9 10   CREATININE mg/dL 0.99 1.05*  --  0.73 0.82   GLUCOSE mg/dL 116* 105*  --  107* 120*   Estimated Creatinine Clearance: 55.5 mL/min (by C-G formula based on SCr of 0.99 mg/dL).  Results from last 7 days   Lab Units 10/16/22  2038   ALBUMIN g/dL 4.40   BILIRUBIN mg/dL 1.1   ALK PHOS U/L 99   AST (SGOT) U/L 17   ALT (SGPT) U/L 12     Results from last 7 days   Lab Units 10/19/22  0411 10/18/22  0415 10/17/22  0516 10/16/22  2038   CALCIUM mg/dL 9.2 9.0 9.0 9.6   ALBUMIN g/dL  --   --   --  4.40   MAGNESIUM mg/dL  --   --  2.1 1.7       Results from last 7 days   Lab Units 10/17/22  0516   TROPONIN T ng/mL <0.010   PROBNP pg/mL 941.0*           Invalid input(s): LDLCALC  Results from last 7 days   Lab Units 10/17/22  1437 10/17/22  0856 10/17/22  0516 10/16/22  2109   BLOODCX   --   --  No growth at 2 days  No growth at 2 days No growth at 2 days  No growth at 2 days   RESPCX   --  Rare Normal respiratory juan daniel. No S. aureus or Pseudomonas aeruginosa detected. Final report.  --   --    MRSAPCR  No MRSA Detected  --   --   --      Results from last 7 days   Lab Units 10/16/22  2113   COVID19  Not Detected       Test Results Pending at Discharge     Pending Labs     Order Current Status    Blood Culture - Blood, Arm, Left Preliminary result    Blood Culture - Blood, Arm, Right Preliminary result    Blood Culture - Blood, Hand, Left Preliminary result    Blood Culture - Blood, Hand, Right Preliminary result          Discharge Details        Discharge Medications      New Medications      Instructions Start Date   amoxicillin-clavulanate 875-125 MG per tablet  Commonly known as: Augmentin   1 tablet, Oral, 2 Times Daily   Start Date: October 20, 2022     budesonide 0.5 MG/2ML nebulizer solution  Commonly known as: PULMICORT   0.5 mg, Nebulization, 2 Times Daily - RT      ipratropium-albuterol  0.5-2.5 mg/3 ml nebulizer  Commonly known as: DUO-NEB   3 mL, Nebulization, Every 6 Hours While Awake - RT         Changes to Medications      Instructions Start Date   azithromycin 250 MG tablet  Commonly known as: Zithromax  What changed:   · how much to take  · how to take this  · when to take this  · additional instructions   250 mg, Oral, Daily   Start Date: October 20, 2022     polyethylene glycol packet  Commonly known as: MIRALAX  What changed:   · when to take this  · reasons to take this   17 g, Oral, 2 Times Daily      potassium chloride 10 MEQ CR tablet  What changed: Another medication with the same name was removed. Continue taking this medication, and follow the directions you see here.   10 mEq, Oral, Daily      predniSONE 20 MG tablet  Commonly known as: DELTASONE  What changed:   · how much to take  · how to take this  · when to take this  · additional instructions  · These instructions start on October 21, 2022. If you are unsure what to do until then, ask your doctor or other care provider.   40 mg, Oral, Daily With Breakfast   Start Date: October 21, 2022        Continue These Medications      Instructions Start Date   albuterol sulfate  (90 Base) MCG/ACT inhaler  Commonly known as: PROVENTIL HFA;VENTOLIN HFA;PROAIR HFA   2 puffs, Inhalation, Every 4 Hours PRN      albuterol (2.5 MG/3ML) 0.083% nebulizer solution  Commonly known as: PROVENTIL   USE 1 VIAL VIA NEBULIZER EVERY FOUR HOURS AS NEEDED FOR WHEEZING      amLODIPine 10 MG tablet  Commonly known as: NORVASC   TAKE 1 TABLET BY MOUTH EVERY DAY      aspirin 81 MG EC tablet   81 mg, Oral, Daily      atorvastatin 40 MG tablet  Commonly known as: LIPITOR   TAKE 1 TABLET BY MOUTH EVERY DAY      benzonatate 100 MG capsule  Commonly known as: TESSALON   TAKE 1 CAPSULE BY MOUTH 2 TIMES A DAY AS NEEDED FOR COUGH      butalbital-acetaminophen-caffeine -40 MG per tablet  Commonly known as: FIORICET, ESGIC   1 tablet, Oral, Every 6 Hours  PRN      carvedilol 6.25 MG tablet  Commonly known as: COREG   TAKE 1 TABLET BY MOUTH TWICE A DAY WITH MEALS      cyclobenzaprine 10 MG tablet  Commonly known as: FLEXERIL   10 mg, Oral, Every Night at Bedtime      fish oil 1000 MG capsule capsule   Oral, Nightly      furosemide 40 MG tablet  Commonly known as: LASIX   40 mg, Oral, 2 Times Daily      guaiFENesin 600 MG 12 hr tablet  Commonly known as: MUCINEX   600 mg, Oral, 2 Times Daily      HYDROcodone-acetaminophen 5-325 MG per tablet  Commonly known as: NORCO   1 tablet, Oral, Every 8 Hours PRN, Chronic pain medicine for low back pain      lisinopril 40 MG tablet  Commonly known as: PRINIVIL,ZESTRIL   40 mg, Oral, Daily      loratadine 10 MG tablet  Commonly known as: CLARITIN   10 mg, Oral, 2 times daily      Magnesium Oxide 400 (240 Mg) MG tablet   TAKE 1 TABLET BY MOUTH EVERY DAY      meclizine 25 MG tablet  Commonly known as: ANTIVERT   25 mg, Oral, 3 Times Daily PRN, prn      Multivital tablet tablet  Generic drug: multivitamin with minerals   1 tablet, Oral, Daily      Nebulizer device   1 Units, Does not apply, 4 Times Daily PRN, J44.1 j20.8      O2  Commonly known as: OXYGEN   3 L/min, Inhalation, Continuous      omeprazole 40 MG capsule  Commonly known as: priLOSEC   TAKE 1 CAPSULE BY MOUTH EVERY DAY      ondansetron 4 MG tablet  Commonly known as: ZOFRAN   4 mg, Oral, Every 12 Hours PRN      roflumilast 500 MCG tablet tablet  Commonly known as: DALIRESP   500 mcg, Oral, Daily      rOPINIRole 2 MG tablet  Commonly known as: REQUIP   TAKE 1 TABLET BY MOUTH EVERY NIGHT      sertraline 100 MG tablet  Commonly known as: ZOLOFT   TAKE 1 TABLET BY MOUTH EVERY DAY      sodium chloride 0.9 % flush 0.9 % solution   No dose, route, or frequency recorded.      Symjepi 0.3 MG/0.3ML solution prefilled syringe  Generic drug: EPINEPHrine   No dose, route, or frequency recorded.      zaleplon 10 MG capsule  Commonly known as: SONATA   10 mg, Oral, Nightly         Stop  These Medications    doxycycline 100 MG tablet  Commonly known as: VIBRAMYICN     fluticasone-salmeterol 250-50 MCG/DOSE DISKUS  Commonly known as: Advair Diskus     Spiriva HandiHaler 18 MCG per inhalation capsule  Generic drug: tiotropium            Allergies   Allergen Reactions   • Bupropion Itching   • Cephalexin Itching     Tolerated piperacillin/tazobactam, ampicillin, cefdinir, and ceftriaxone   • Metoprolol Itching       Discharge Disposition:  Home or Self Care      Discharge Diet:  Diet Order   Procedures   • Diet Regular; Cardiac       Discharge Activity:       CODE STATUS:    Code Status and Medical Interventions:   Ordered at: 10/17/22 0045     Code Status (Patient has no pulse and is not breathing):    CPR (Attempt to Resuscitate)     Medical Interventions (Patient has pulse or is breathing):    Full Support       Future Appointments   Date Time Provider Department Center   11/8/2022  6:00 PM KAJ[facility] CT 2 BH [facility] CT [facility]   12/13/2022  2:30 PM Javan Martinez MD [facility]   1/24/2023  3:30 PM LAB CHAIR 5 [facility]  LAB [facility]   1/24/2023  4:00 PM Daniela Shin MD PhD [facility] CBC [facility] [place]   2/7/2023  2:30 PM Melanie Sykes APRN [facility]  [facility]      Contact information for follow-up providers     Ag Melo Jr., MD Follow up in 2 week(s).    Specialty: Pulmonary Disease  Contact information:  [address]  [facility] [address]  [phone]             Javan Martinez MD Follow up in 1 week(s).    Specialty: Internal Medicine  Contact information:  [address] [facility] [address]  [phone]             Javan Martinez MD .    Specialty: Internal Medicine  Contact information:  [address]  [facility] [address]  [phone]                   Contact information for after-discharge care     Durable Medical Equipment     [facility] .    Service: Durable Medical Equipment  Contact information:  [address] [facility]  [place] [address]  [phone]                             Time Spent on Discharge:  Greater than 30 minutes      Arina Castaneda MD  [facility]  10/19/22  17:20 EDT

## 2022-10-20 ENCOUNTER — TRANSITIONAL CARE MANAGEMENT TELEPHONE ENCOUNTER (OUTPATIENT)
Dept: CALL CENTER | Facility: HOSPITAL | Age: 67
End: 2022-10-20

## 2022-10-20 ENCOUNTER — TELEPHONE (OUTPATIENT)
Dept: FAMILY MEDICINE CLINIC | Facility: CLINIC | Age: 67
End: 2022-10-20

## 2022-10-20 DIAGNOSIS — Z79.891 LONG TERM CURRENT USE OF OPIATE ANALGESIC: ICD-10-CM

## 2022-10-20 RX ORDER — ONDANSETRON 4 MG/1
4 TABLET, FILM COATED ORAL EVERY 12 HOURS PRN
Qty: 90 TABLET | Refills: 3 | Status: SHIPPED | OUTPATIENT
Start: 2022-10-20 | End: 2022-10-25 | Stop reason: SDUPTHER

## 2022-10-20 RX ORDER — HYDROCODONE BITARTRATE AND ACETAMINOPHEN 5; 325 MG/1; MG/1
1 TABLET ORAL EVERY 8 HOURS PRN
Qty: 90 TABLET | Refills: 0 | Status: CANCELLED | OUTPATIENT
Start: 2022-10-20

## 2022-10-20 RX ORDER — HYDROCODONE BITARTRATE AND ACETAMINOPHEN 5; 325 MG/1; MG/1
1 TABLET ORAL EVERY 8 HOURS PRN
Qty: 90 TABLET | Refills: 0 | Status: SHIPPED | OUTPATIENT
Start: 2022-10-20 | End: 2022-11-17 | Stop reason: SDUPTHER

## 2022-10-20 NOTE — OUTREACH NOTE
Call Center TCM Note    Flowsheet Row Responses   [facility] patient discharged from? [facility]   Does the patient have one of the following disease processes/diagnoses(primary or secondary)? Pneumonia   TCM attempt successful? Yes   Call start time 1041   Discharge diagnosis COPD with acute exacerbation,   pneumonia   Meds reviewed with patient/caregiver? Yes   Is the patient having any side effects they believe may be caused by any medication additions or changes? No   Does the patient have all medications ordered at discharge? Yes   Is the patient taking all medications as directed (includes completed medication regime)? Yes   Comments PCP Dr Martinez has no appts within TCM time frame if ofc can review sched & call pt for TCM APPT to be completed by 10/26 if possible but no later than 11/02/2022.   Does the patient have an appointment with their PCP within 7 days of discharge? No appointments available   Has home health visited the patient within 72 hours of discharge? N/A   Has all DME been delivered? No  [[facility] is supposed to deliver new Nebulizer to pt home today. ]   Pulse Ox monitoring None   Did the patient receive a copy of their discharge instructions? Yes   Nursing interventions Reviewed instructions with patient   What is the patient's perception of their health status since discharge? Improving   If the patient is a current smoker, are they able to teach back resources for cessation? Not a smoker   Is the patient/caregiver able to teach back the hierarchy of who to call/visit for symptoms/problems? PCP, Specialist, Home health nurse, Urgent Care, ED, 911 Yes   Is the patient/caregiver able to teach back signs and symptoms of worsening condition: Fever/chills, Shortness of breath, Chest pain   Is the patient/caregiver able to teach back importance of completing antibiotic course of treatment? Yes   TCM call completed? Yes   Wrap up additional comments D/C DX: COPD with acute exacerbation,   pneumonia - Pt states she feels a bit better, breathing less labored. All new rx's in place & new Nebulizer being deliveerd by [facility] today. PCP Dr Martinez has no appts within TCM time frame if ofc can review sched & call pt for TCM APPT to be completed by 10/26 if possible but no later than 11/02/2022.          Chelo Multani MA    10/20/2022, 10:50 EDT

## 2022-10-20 NOTE — TELEPHONE ENCOUNTER
Caller: Paz Browne    Relationship: Self    Best call back number: [phone]    Requested Prescriptions:   Requested Prescriptions     Pending Prescriptions Disp Refills   • HYDROcodone-acetaminophen (NORCO) 5-325 MG per tablet 90 tablet 0     Sig: Take 1 tablet by mouth Every 8 (Eight) Hours As Needed for Moderate Pain. Chronic pain medicine for low back pain        Pharmacy where request should be sent: [facility]/PHARMACY #[id number] - [address] AT [facility] - [phone] [facility] [phone] FX     Additional details provided by patient: WOULD LIKE TO KNOW IF SHE CAN ALSO GET SOMETHING FOR NAUSEA     Does the patient have less than a 3 day supply:  [] Yes  [] No    Harrison Khalil Rep   10/20/22 09:09 EDT

## 2022-10-20 NOTE — PROGRESS NOTES
Enter Query Response Below      Query Response:        -Chronic diastolic CHF         If applicable, please update the problem list.         Patient: Paz Browne        : 1955  Account: [id number]           Admit Date: 10/16/2022        How to Respond to this query:       a. Click New Note     b. Answer query within the yellow box.                c. Update the Problem List, if applicable.      If you have any questions about this query contact me at: [email or web address]    Dr. Castaneda,  Patient has a history of CHF noted without type or acuity. Most recent echo was 22 with EF 54.8%. Prior to admit the patient was taking Lasix, Norvasc, Coreg, Lisinopril which were all continued on admit. Chest x-ray on 10/16 noted:  No pneumothorax or pleural effusion is seen. .    Please clarify the type and acuity of CHF monitored and treated during this admission.    -Chronic diastolic CHF  -Diastolic CHF, unknown acuity  -other_________  -unable to determine         By submitting this query, we are merely seeking further clarification of documentation to accurately reflect all conditions that you are monitoring, evaluating, treating or that extend the hospitalization or utilize additional resources of care. Please utilize your independent clinical judgment when addressing the question(s) above.     This query and your response, once completed, will be entered into the legal medical record.    Sincerely,  Tricia Weber  Clinical Documentation Integrity Program

## 2022-10-20 NOTE — PROGRESS NOTES
Enter Query Response Below      Query Response:     -Unable to determine         If applicable, please update the problem list.         Patient: Paz Browne        : 1955  Account: [id number]           Admit Date: 10/16/2022        How to Respond to this query:       a. Click New Note     b. Answer query within the yellow box.                c. Update the Problem List, if applicable.      If you have any questions about this query contact me at: [email or web address]    Dr. Castaneda,  Patient admitted with pneumonia, COPD exacerbation and acute on chronic respiratory failure. She is on 2L oxygen baseline prior to admit. On admit O2 sats dropped to 81% on 2L and quickly recovered after sitting down to rest on 2.5L oxygen. During this admission the patient was increased for a few hours to 2.5-3L with O2 sats remaining > 95%. Otherwise on 2L with O2 sats >90%. RR 18-22 during this admit. Persistent cough and shortness of breath. Also treated with broad-spectrum antibiotic, steroids and bronchodilators.    After study, was Acute respiratory failure clinically supported during this admission?    -Yes, please include additional clinical indicators:____________  -No  -Other- specify________  -Unable to determine      By submitting this query, we are merely seeking further clarification of documentation to accurately reflect all conditions that you are monitoring, evaluating, treating or that extend the hospitalization or utilize additional resources of care. Please utilize your independent clinical judgment when addressing the question(s) above.     This query and your response, once completed, will be entered into the legal medical record.    Sincerely,  Tricia Weber  Clinical Documentation Integrity Program

## 2022-10-21 LAB
BACTERIA SPEC AEROBE CULT: NORMAL
BACTERIA SPEC AEROBE CULT: NORMAL

## 2022-10-22 LAB
BACTERIA SPEC AEROBE CULT: NORMAL
BACTERIA SPEC AEROBE CULT: NORMAL

## 2022-10-25 RX ORDER — ONDANSETRON 4 MG/1
4 TABLET, FILM COATED ORAL EVERY 12 HOURS PRN
Qty: 90 TABLET | Refills: 3 | Status: SHIPPED | OUTPATIENT
Start: 2022-10-25

## 2022-10-26 ENCOUNTER — OFFICE VISIT (OUTPATIENT)
Dept: FAMILY MEDICINE CLINIC | Facility: CLINIC | Age: 67
End: 2022-10-26

## 2022-10-26 VITALS
OXYGEN SATURATION: 95 % | DIASTOLIC BLOOD PRESSURE: 68 MMHG | TEMPERATURE: 98.4 F | WEIGHT: 139.2 LBS | BODY MASS INDEX: 21.85 KG/M2 | HEIGHT: 67 IN | HEART RATE: 86 BPM | SYSTOLIC BLOOD PRESSURE: 118 MMHG

## 2022-10-26 DIAGNOSIS — J18.9 PNEUMONIA OF BOTH LOWER LOBES DUE TO INFECTIOUS ORGANISM: ICD-10-CM

## 2022-10-26 DIAGNOSIS — H91.93 DECREASED HEARING OF BOTH EARS: ICD-10-CM

## 2022-10-26 DIAGNOSIS — Z00.00 MEDICARE ANNUAL WELLNESS VISIT, SUBSEQUENT: Primary | ICD-10-CM

## 2022-10-26 PROBLEM — R91.1 PULMONARY NODULE: Status: RESOLVED | Noted: 2022-10-17 | Resolved: 2022-10-26

## 2022-10-26 PROBLEM — J44.9 COPD (CHRONIC OBSTRUCTIVE PULMONARY DISEASE): Status: RESOLVED | Noted: 2019-06-30 | Resolved: 2022-10-26

## 2022-10-26 PROBLEM — J44.9 COPD (CHRONIC OBSTRUCTIVE PULMONARY DISEASE) (HCC): Status: RESOLVED | Noted: 2022-10-17 | Resolved: 2022-10-26

## 2022-10-26 PROBLEM — J96.10 CHRONIC RESPIRATORY FAILURE: Status: RESOLVED | Noted: 2022-10-19 | Resolved: 2022-10-26

## 2022-10-26 PROBLEM — J96.01 ACUTE HYPOXEMIC RESPIRATORY FAILURE (HCC): Status: RESOLVED | Noted: 2022-10-17 | Resolved: 2022-10-26

## 2022-10-26 PROCEDURE — 99213 OFFICE O/P EST LOW 20 MIN: CPT | Performed by: INTERNAL MEDICINE

## 2022-10-26 PROCEDURE — G0439 PPPS, SUBSEQ VISIT: HCPCS | Performed by: INTERNAL MEDICINE

## 2022-10-26 PROCEDURE — 1170F FXNL STATUS ASSESSED: CPT | Performed by: INTERNAL MEDICINE

## 2022-10-26 PROCEDURE — 1159F MED LIST DOCD IN RCRD: CPT | Performed by: INTERNAL MEDICINE

## 2022-10-26 RX ORDER — ROFLUMILAST 500 UG/1
500 TABLET ORAL DAILY
Qty: 90 TABLET | Refills: 1 | Status: SHIPPED | OUTPATIENT
Start: 2022-10-26 | End: 2023-03-24 | Stop reason: SDUPTHER

## 2022-10-26 RX ORDER — FLUCONAZOLE 100 MG/1
100 TABLET ORAL DAILY
Qty: 14 TABLET | Refills: 1 | Status: SHIPPED | OUTPATIENT
Start: 2022-10-26 | End: 2023-03-24

## 2022-10-26 NOTE — PROGRESS NOTES
QUICK REFERENCE INFORMATION:  The ABCs of the Annual Wellness Visit    Subsequent Medicare Wellness   Current outpatient and discharge medications have been reconciled for the patient.  Reviewed by: Javan Martinez MD10/16-10/19/2022  Patient was seen for Medicare wellness exam.  Patient was seen for hospital follow-up for pneumonia.  Patient was treated for bilateral pneumonia and is being seen for follow-up.  Patient is doing well does not want a repeat chest x-ray patient is following up with pulmonologist in 2 weeks.  Patient will let him decide if she should get the x-ray.  Patient does have trouble hearing and wants to be referred to ENT for evaluation    Dictated utilizing Dragon dictation. If there are questions or for further clarification, please contact me.    HEALTH RISK ASSESSMENT    1955    Recent Hospitalizations:  Recently treated at the following:  [facility].        Current Medical Providers:  Patient Care Team:  Javan Martinez MD as PCP - General (Internal Medicine)  Javan Martinez MD as PCP - Family Medicine  Ag Melo Jr., MD as Consulting Physician (Pulmonary Disease)  Cleveland Devine MD as Consulting Physician (Gastroenterology)  Earnest Gan MD as Consulting Physician (Cardiology)  Javan Martinez MD Zhong, Wangjian, MD PhD as Consulting Physician (Hematology and Oncology)  Javan Martinez MD as Referring Physician (Internal Medicine)        Smoking Status:  Social History     Tobacco Use   Smoking Status Former   • Packs/day: 3.00   • Years: 54.00   • Pack years: 162.00   • Types: Cigarettes   • Quit date: 2007   • Years since quitting: 15.8   Smokeless Tobacco Never   Tobacco Comments    caffeine - tea or mt dew        Alcohol Consumption:  Social History     Substance and Sexual Activity   Alcohol Use No       Depression Screen:   PHQ-2/PHQ-9 Depression Screening 10/26/2022   Retired PHQ-9 Total Score -   Retired Total Score -   Little Interest  or Pleasure in Doing Things 0-->not at all   Feeling Down, Depressed or Hopeless 0-->not at all   PHQ-9: Brief Depression Severity Measure Score 0       Health Habits and Functional and Cognitive Screening:  Functional & Cognitive Status 10/26/2022   Do you have difficulty preparing food and eating? No   Do you have difficulty bathing yourself, getting dressed or grooming yourself? No   Do you have difficulty using the toilet? No   Do you have difficulty moving around from place to place? No   Do you have trouble with steps or getting out of a bed or a chair? Yes   Current Diet Well Balanced Diet   Dental Exam Up to date   Eye Exam Not up to date   Exercise (times per week) 0 times per week   Current Exercises Include No Regular Exercise   Current Exercise Activities Include -   Do you need help using the phone?  No   Are you deaf or do you have serious difficulty hearing?  No   Do you need help with transportation? Yes   Do you need help shopping? Yes   Do you need help preparing meals?  Yes   Do you need help with housework?  No   Do you need help with laundry? No   Do you need help taking your medications? No   Do you need help managing money? No   Do you ever drive or ride in a car without wearing a seat belt? No   Have you felt unusual stress, anger or loneliness in the last month? No   Who do you live with? Spouse   If you need help, do you have trouble finding someone available to you? No   Have you been bothered in the last four weeks by sexual problems? No   Do you have difficulty concentrating, remembering or making decisions? No           Does the patient have evidence of cognitive impairment? No    Aspirin use counseling: Taking ASA appropriately as indicated      Recent Lab Results:  CMP:  Lab Results   Component Value Date    BUN 18 10/19/2022    CREATININE 0.99 10/19/2022    EGFRIFNONA 61 08/16/2021    EGFRIFAFRI 86 07/12/2021    BCR 18.2 10/19/2022     10/19/2022    K 4.5 10/19/2022    CO2 28.0  10/19/2022    CALCIUM 9.2 10/19/2022    ALBUMIN 4.40 10/16/2022    BILITOT 1.1 10/16/2022    ALKPHOS 99 10/16/2022    AST 17 10/16/2022    ALT 12 10/16/2022     Lipid Panel:  Lab Results   Component Value Date    CHOL 220 (H) 06/17/2022    TRIG 140 06/17/2022    HDL 77 (H) 06/17/2022    VLDL 24 06/17/2022    LDLHDL 1.49 06/17/2022     HbA1c:  Lab Results   Component Value Date    HGBA1C 4.80 10/17/2022       Visual Acuity:  No results found.    Age-appropriate Screening Schedule:  Refer to the list below for future screening recommendations based on patient's age, sex and/or medical conditions. Orders for these recommended tests are listed in the plan section. The patient has been provided with a written plan.    Health Maintenance   Topic Date Due   • DXA SCAN  Never done   • ZOSTER VACCINE (2 of 2) 01/13/2017   • MAMMOGRAM  06/13/2020   • PAP SMEAR  06/13/2021   • INFLUENZA VACCINE  08/01/2022   • LIPID PANEL  06/17/2023   • TDAP/TD VACCINES (2 - Td or Tdap) 11/18/2026        Subjective   History of Present Illness    Paz Browne is a 67 y.o. female who presents for an Subsequent Wellness Visit.    The following portions of the patient's history were reviewed and updated as appropriate: allergies, current medications, past family history, past medical history, past social history, past surgical history and problem list.    Outpatient Medications Prior to Visit   Medication Sig Dispense Refill   • albuterol (PROVENTIL) (2.5 MG/3ML) 0.083% nebulizer solution USE 1 VIAL VIA NEBULIZER EVERY FOUR HOURS AS NEEDED FOR WHEEZING 150 mL 3   • albuterol sulfate  (90 Base) MCG/ACT inhaler Inhale 2 puffs Every 4 (Four) Hours As Needed for Wheezing. 18 g 3   • amLODIPine (NORVASC) 10 MG tablet TAKE 1 TABLET BY MOUTH EVERY DAY 90 tablet 1   • aspirin 81 MG EC tablet Take 1 tablet by mouth Daily.     • atorvastatin (LIPITOR) 40 MG tablet TAKE 1 TABLET BY MOUTH EVERY DAY 90 tablet 2   • benzonatate (TESSALON) 100 MG  capsule TAKE 1 CAPSULE BY MOUTH 2 TIMES A DAY AS NEEDED FOR COUGH 180 capsule 1   • budesonide (PULMICORT) 0.5 MG/2ML nebulizer solution Take 2 mL by nebulization 2 (Two) Times a Day for 30 days. Indications: Chronic Obstructive Lung Disease, Chronic hypoxic respiratory failure 120 mL 0   • butalbital-acetaminophen-caffeine (FIORICET, ESGIC) -40 MG per tablet Take 1 tablet by mouth Every 6 (Six) Hours As Needed for Headache. 4 tablet 0   • carvedilol (COREG) 6.25 MG tablet TAKE 1 TABLET BY MOUTH TWICE A DAY WITH MEALS 180 tablet 2   • cyclobenzaprine (FLEXERIL) 10 MG tablet Take 1 tablet by mouth every night at bedtime. 90 tablet 3   • furosemide (LASIX) 40 MG tablet Take 1 tablet by mouth 2 (Two) Times a Day. 60 tablet 3   • HYDROcodone-acetaminophen (NORCO) 5-325 MG per tablet Take 1 tablet by mouth Every 8 (Eight) Hours As Needed for Moderate Pain. Chronic pain medicine for low back pain 90 tablet 0   • ipratropium-albuterol (DUO-NEB) 0.5-2.5 mg/3 ml nebulizer Take 3 mL by nebulization Every 6 (Six) Hours While Awake for 30 days. Indications: Chronic Obstructive Lung Disease, COPD, chronic hypoxic respiratory failure. 270 mL 0   • lisinopril (PRINIVIL,ZESTRIL) 40 MG tablet Take 40 mg by mouth Daily.     • loratadine (CLARITIN) 10 MG tablet Take 10 mg by mouth 2 (two) times a day.     • Magnesium Oxide 400 (240 Mg) MG tablet TAKE 1 TABLET BY MOUTH EVERY DAY 90 tablet 1   • meclizine (ANTIVERT) 25 MG tablet Take 1 tablet by mouth 3 (Three) Times a Day As Needed for Dizziness. prn 30 tablet 3   • Multiple Vitamins-Minerals (MULTIVITAL) tablet Take 1 tablet by mouth Daily.     • Nebulizer device 1 Units 4 (Four) Times a Day As Needed (Wheezing). J44.1 j20.8 1 each 0   • O2 (OXYGEN) Inhale 3 L/min Continuous.     • Omega-3 Fatty Acids (fish oil) 1000 MG capsule capsule Take  by mouth Every Night.     • omeprazole (priLOSEC) 40 MG capsule TAKE 1 CAPSULE BY MOUTH EVERY DAY 90 capsule 1   • ondansetron (ZOFRAN) 4  MG tablet Take 1 tablet by mouth Every 12 (Twelve) Hours As Needed for Nausea or Vomiting. 90 tablet 3   • polyethylene glycol (MIRALAX) packet Take 17 g by mouth 2 (Two) Times a Day.     • potassium chloride 10 MEQ CR tablet Take 1 tablet by mouth Daily. 5 tablet 0   • rOPINIRole (REQUIP) 2 MG tablet TAKE 1 TABLET BY MOUTH EVERY NIGHT 90 tablet 2   • sertraline (ZOLOFT) 100 MG tablet TAKE 1 TABLET BY MOUTH EVERY DAY 90 tablet 1   • Symjepi 0.3 MG/0.3ML solution prefilled syringe      • zaleplon (SONATA) 10 MG capsule Take 1 capsule by mouth Every Night. 30 capsule 3   • roflumilast (DALIRESP) 500 MCG tablet tablet Take 1 tablet by mouth Daily. 90 tablet 0   • azithromycin (Zithromax) 250 MG tablet Take 1 tablet by mouth Daily. 2 tablet 0   • guaiFENesin (MUCINEX) 600 MG 12 hr tablet Take 600 mg by mouth 2 (Two) Times a Day.     • Sodium Chloride Flush (sodium chloride 0.9 % flush) 0.9 % solution        No facility-administered medications prior to visit.       Patient Active Problem List   Diagnosis   • PAF (paroxysmal atrial fibrillation) ([facility])   • Hypertension   • Hyperlipidemia   • Pain medication agreement   • Hiatal hernia   • Primary osteoarthritis involving multiple joints   • Pulmonary hypertension (HCC)   • S/P TVR (tricuspid valve repair)   • Pulmonary nodule   • Restless leg syndrome   • Chronic low back pain   • Dysplastic polyp of colon   • History of mitral valve replacement with tissue graft   • Chronic hypoxemic respiratory failure (HCC)   • Anemia   • Celiac artery stenosis ([facility])   • Intertrigo   • Abnormal EKG   • Muscle spasms of both lower extremities   • Iron deficiency anemia   • Adenomatous polyp of colon   • Gastroesophageal reflux disease without esophagitis   • Headache   • History of endocarditis   • Pneumonia of both lower lobes due to infectious organism   • Peptic ulcer disease   • Peripheral vascular disease (HCC)   • Hyperlipidemia   • Hyperglycemia   • COPD with acute exacerbation  ([facility])       Advance Care Planning:  ACP discussion was held with the patient during this visit. Patient does not have an advance directive, information provided.    Identification of Risk Factors:  Risk factors include: Advance Directive Discussion.    Review of Systems   Constitutional: Negative for fatigue and fever.   HENT: Positive for congestion and hearing loss. Negative for trouble swallowing.    Eyes: Negative for discharge and visual disturbance.   Respiratory: Negative for choking and shortness of breath.    Cardiovascular: Negative for chest pain and palpitations.   Gastrointestinal: Negative for abdominal pain and blood in stool.   Endocrine: Negative.    Genitourinary: Negative for genital sores and hematuria.   Musculoskeletal: Negative for gait problem and joint swelling.   Skin: Negative for color change, pallor, rash and wound.   Allergic/Immunologic: Positive for environmental allergies. Negative for immunocompromised state.   Neurological: Negative for facial asymmetry and speech difficulty.   Psychiatric/Behavioral: Negative for hallucinations and suicidal ideas.       Compared to one year ago, the patient feels her physical health is the same.  Compared to one year ago, the patient feels her mental health is better.    Objective     Physical Exam  Vitals and nursing note reviewed.   Constitutional:       Appearance: Normal appearance. She is well-developed.   HENT:      Head: Normocephalic and atraumatic.      Nose: Nose normal.      Mouth/Throat:      Mouth: Mucous membranes are moist.      Pharynx: Oropharynx is clear.   Eyes:      Extraocular Movements: Extraocular movements intact.      Conjunctiva/sclera: Conjunctivae normal.      Pupils: Pupils are equal, round, and reactive to light.   Cardiovascular:      Rate and Rhythm: Normal rate and regular rhythm.      Heart sounds: Normal heart sounds. No murmur heard.    No friction rub. No gallop.   Pulmonary:      Effort: Pulmonary effort is  "normal. No respiratory distress.      Breath sounds: Normal breath sounds. No stridor. No wheezing, rhonchi or rales.   Chest:      Chest wall: No tenderness.   Abdominal:      General: Bowel sounds are normal.      Palpations: Abdomen is soft.   Musculoskeletal:         General: Normal range of motion.      Cervical back: Normal range of motion and neck supple.   Skin:     General: Skin is warm and dry.   Neurological:      General: No focal deficit present.      Mental Status: She is alert and oriented to person, place, and time. Mental status is at baseline.   Psychiatric:         Mood and Affect: Mood normal.         Behavior: Behavior normal.         Thought Content: Thought content normal.         Judgment: Judgment normal.         Vitals:    10/26/22 1436   BP: 118/68   Pulse: 86   Temp: 98.4 °F (36.9 °C)   SpO2: 95%   Weight: 63.1 kg (139 lb 3.2 oz)   Height: 170.2 cm (67\")       BMI is within normal parameters. No other follow-up for BMI required.      Assessment & Plan #1 recommend follow-up x-ray #2 ENT for decreased hearing #3 follow-up with pulmonologist  Patient Self-Management and Personalized Health Advice  The patient has been provided with information about: exercise and preventive services including:   · NA.    Visit Diagnoses:    ICD-10-CM ICD-9-CM   1. Medicare annual wellness visit, subsequent  Z00.00 V70.0   2. Pneumonia of both lower lobes due to infectious organism  J18.9 486   3. Decreased hearing of both ears  H91.93 389.9       Orders Placed This Encounter   Procedures   • Ambulatory Referral to ENT (Otolaryngology)     Referral Priority:   Routine     Referral Type:   Consultation     Referral Reason:   Specialty Services Required     Requested Specialty:   Otolaryngology     Number of Visits Requested:   1       Outpatient Encounter Medications as of 10/26/2022   Medication Sig Dispense Refill   • albuterol (PROVENTIL) (2.5 MG/3ML) 0.083% nebulizer solution USE 1 VIAL VIA NEBULIZER " EVERY FOUR HOURS AS NEEDED FOR WHEEZING 150 mL 3   • albuterol sulfate  (90 Base) MCG/ACT inhaler Inhale 2 puffs Every 4 (Four) Hours As Needed for Wheezing. 18 g 3   • amLODIPine (NORVASC) 10 MG tablet TAKE 1 TABLET BY MOUTH EVERY DAY 90 tablet 1   • aspirin 81 MG EC tablet Take 1 tablet by mouth Daily.     • atorvastatin (LIPITOR) 40 MG tablet TAKE 1 TABLET BY MOUTH EVERY DAY 90 tablet 2   • benzonatate (TESSALON) 100 MG capsule TAKE 1 CAPSULE BY MOUTH 2 TIMES A DAY AS NEEDED FOR COUGH 180 capsule 1   • budesonide (PULMICORT) 0.5 MG/2ML nebulizer solution Take 2 mL by nebulization 2 (Two) Times a Day for 30 days. Indications: Chronic Obstructive Lung Disease, Chronic hypoxic respiratory failure 120 mL 0   • butalbital-acetaminophen-caffeine (FIORICET, ESGIC) -40 MG per tablet Take 1 tablet by mouth Every 6 (Six) Hours As Needed for Headache. 4 tablet 0   • carvedilol (COREG) 6.25 MG tablet TAKE 1 TABLET BY MOUTH TWICE A DAY WITH MEALS 180 tablet 2   • cyclobenzaprine (FLEXERIL) 10 MG tablet Take 1 tablet by mouth every night at bedtime. 90 tablet 3   • furosemide (LASIX) 40 MG tablet Take 1 tablet by mouth 2 (Two) Times a Day. 60 tablet 3   • HYDROcodone-acetaminophen (NORCO) 5-325 MG per tablet Take 1 tablet by mouth Every 8 (Eight) Hours As Needed for Moderate Pain. Chronic pain medicine for low back pain 90 tablet 0   • ipratropium-albuterol (DUO-NEB) 0.5-2.5 mg/3 ml nebulizer Take 3 mL by nebulization Every 6 (Six) Hours While Awake for 30 days. Indications: Chronic Obstructive Lung Disease, COPD, chronic hypoxic respiratory failure. 270 mL 0   • lisinopril (PRINIVIL,ZESTRIL) 40 MG tablet Take 40 mg by mouth Daily.     • loratadine (CLARITIN) 10 MG tablet Take 10 mg by mouth 2 (two) times a day.     • Magnesium Oxide 400 (240 Mg) MG tablet TAKE 1 TABLET BY MOUTH EVERY DAY 90 tablet 1   • meclizine (ANTIVERT) 25 MG tablet Take 1 tablet by mouth 3 (Three) Times a Day As Needed for Dizziness. prn 30  tablet 3   • Multiple Vitamins-Minerals (MULTIVITAL) tablet Take 1 tablet by mouth Daily.     • Nebulizer device 1 Units 4 (Four) Times a Day As Needed (Wheezing). J44.1 j20.8 1 each 0   • O2 (OXYGEN) Inhale 3 L/min Continuous.     • Omega-3 Fatty Acids (fish oil) 1000 MG capsule capsule Take  by mouth Every Night.     • omeprazole (priLOSEC) 40 MG capsule TAKE 1 CAPSULE BY MOUTH EVERY DAY 90 capsule 1   • ondansetron (ZOFRAN) 4 MG tablet Take 1 tablet by mouth Every 12 (Twelve) Hours As Needed for Nausea or Vomiting. 90 tablet 3   • polyethylene glycol (MIRALAX) packet Take 17 g by mouth 2 (Two) Times a Day.     • potassium chloride 10 MEQ CR tablet Take 1 tablet by mouth Daily. 5 tablet 0   • roflumilast (DALIRESP) 500 MCG tablet tablet Take 1 tablet by mouth Daily. 90 tablet 1   • rOPINIRole (REQUIP) 2 MG tablet TAKE 1 TABLET BY MOUTH EVERY NIGHT 90 tablet 2   • sertraline (ZOLOFT) 100 MG tablet TAKE 1 TABLET BY MOUTH EVERY DAY 90 tablet 1   • Symjepi 0.3 MG/0.3ML solution prefilled syringe      • zaleplon (SONATA) 10 MG capsule Take 1 capsule by mouth Every Night. 30 capsule 3   • [DISCONTINUED] roflumilast (DALIRESP) 500 MCG tablet tablet Take 1 tablet by mouth Daily. 90 tablet 0   • fluconazole (Diflucan) 100 MG tablet Take 1 tablet by mouth Daily. 14 tablet 1   • [DISCONTINUED] azithromycin (Zithromax) 250 MG tablet Take 1 tablet by mouth Daily. 2 tablet 0   • [DISCONTINUED] guaiFENesin (MUCINEX) 600 MG 12 hr tablet Take 600 mg by mouth 2 (Two) Times a Day.     • [DISCONTINUED] Sodium Chloride Flush (sodium chloride 0.9 % flush) 0.9 % solution        No facility-administered encounter medications on file as of 10/26/2022.       Reviewed use of high risk medication in the elderly: not applicable  Reviewed for potential of harmful drug interactions in the elderly: not applicable    Follow Up:  Return in about 6 months (around 4/26/2023), or if symptoms worsen or fail to improve, for Recheck.     An After Visit  Summary and PPPS with all of these plans were given to the patient.

## 2022-10-26 NOTE — PATIENT INSTRUCTIONS
Medicare Wellness  Personal Prevention Plan of Service     Date of Office Visit:    Encounter Provider:  Javan Martinez MD  Place of Service:  [facility]  Patient Name: Paz Browne  :  1955    As part of the Medicare Wellness portion of your visit today, we are providing you with this personalized preventive plan of services (PPPS). This plan is based upon recommendations of the [place] Preventive Services Task Force (USPSTF) and the Advisory Committee on Immunization Practices (ACIP).    This lists the preventive care services that should be considered, and provides dates of when you are due. Items listed as completed are up-to-date and do not require any further intervention.    Health Maintenance   Topic Date Due    DXA SCAN  Never done    ZOSTER VACCINE (2 of 2) 2017    MAMMOGRAM  2020    ANNUAL WELLNESS VISIT  2020    Pneumococcal Vaccine 65+ (3 - PPSV23 if available, else PCV20) 10/30/2020    PAP SMEAR  2021    INFLUENZA VACCINE  2022    COVID-19 Vaccine (5 - Booster for Pfizer series) 2022    LIPID PANEL  2023    COLORECTAL CANCER SCREENING  2024    TDAP/TD VACCINES (2 - Td or Tdap) 2026    HEPATITIS C SCREENING  Addressed       Orders Placed This Encounter   Procedures    Ambulatory Referral to ENT (Otolaryngology)     Referral Priority:   Routine     Referral Type:   Consultation     Referral Reason:   Specialty Services Required     Requested Specialty:   Otolaryngology     Number of Visits Requested:   1       No follow-ups on file.

## 2022-11-08 ENCOUNTER — APPOINTMENT (OUTPATIENT)
Dept: CT IMAGING | Facility: HOSPITAL | Age: 67
End: 2022-11-08

## 2022-11-14 RX ORDER — FUROSEMIDE 40 MG/1
TABLET ORAL
Qty: 180 TABLET | Refills: 1 | Status: SHIPPED | OUTPATIENT
Start: 2022-11-14

## 2022-11-17 DIAGNOSIS — Z79.891 LONG TERM CURRENT USE OF OPIATE ANALGESIC: ICD-10-CM

## 2022-11-17 RX ORDER — HYDROCODONE BITARTRATE AND ACETAMINOPHEN 5; 325 MG/1; MG/1
1 TABLET ORAL EVERY 8 HOURS PRN
Qty: 90 TABLET | Refills: 0 | Status: SHIPPED | OUTPATIENT
Start: 2022-11-17 | End: 2022-12-16 | Stop reason: SDUPTHER

## 2022-11-17 NOTE — TELEPHONE ENCOUNTER
Caller: Paz Browne    Relationship: Self    Best call back number: [phone]    Requested Prescriptions:   Requested Prescriptions     Pending Prescriptions Disp Refills   • HYDROcodone-acetaminophen (NORCO) 5-325 MG per tablet 90 tablet 0     Sig: Take 1 tablet by mouth Every 8 (Eight) Hours As Needed for Moderate Pain. Chronic pain medicine for low back pain        Pharmacy where request should be sent: [facility]Y #[id number] - [address] AT [facility] - [phone] [facility] [phone] FX     Additional details provided by patient: PATIENT HAS A 4 - 5 DAY SUPPLY LEFT.     Does the patient have less than a 3 day supply:  [] Yes  [x] No    Harrison Nunez Rep   11/17/22 08:49 EST

## 2022-11-21 RX ORDER — ATORVASTATIN CALCIUM 40 MG/1
TABLET, FILM COATED ORAL
Qty: 90 TABLET | Refills: 2 | Status: SHIPPED | OUTPATIENT
Start: 2022-11-21

## 2022-11-23 NOTE — TELEPHONE ENCOUNTER
Needs refill HYDROcodone-acetaminophen (NORCO) 5-325 MG per tablet send to [facility] [address]    Minocycline Counseling: Patient advised regarding possible photosensitivity and discoloration of the teeth, skin, lips, tongue and gums.  Patient instructed to avoid sunlight, if possible.  When exposed to sunlight, patients should wear protective clothing, sunglasses, and sunscreen.  The patient was instructed to call the office immediately if the following severe adverse effects occur:  hearing changes, easy bruising/bleeding, severe headache, or vision changes.  The patient verbalized understanding of the proper use and possible adverse effects of minocycline.  All of the patient's questions and concerns were addressed.

## 2022-12-05 ENCOUNTER — TELEPHONE (OUTPATIENT)
Dept: FAMILY MEDICINE CLINIC | Facility: CLINIC | Age: 67
End: 2022-12-05

## 2022-12-05 NOTE — TELEPHONE ENCOUNTER
Patient  here seeing Jeaneth Wants to know if on call can fill Paz's Budesonide 0.5/2ml and Ipratropium Bromide 0.5 mg/3 mg ? Locally so he can pick it up while in town.

## 2022-12-07 ENCOUNTER — TELEPHONE (OUTPATIENT)
Dept: FAMILY MEDICINE CLINIC | Facility: CLINIC | Age: 67
End: 2022-12-07

## 2022-12-07 RX ORDER — AZITHROMYCIN 250 MG/1
TABLET, FILM COATED ORAL
Qty: 6 TABLET | Refills: 0 | Status: SHIPPED | OUTPATIENT
Start: 2022-12-07 | End: 2023-03-24

## 2022-12-07 NOTE — TELEPHONE ENCOUNTER
Caller: Romana Browne    Relationship: Emergency Contact    Best call back number: [phone]    What medication are you requesting:     What are your current symptoms: SINUS DRAINAGE     How long have you been experiencing symptoms: 1 DAY    Have you had these symptoms before:    [x] Yes  [] No    Have you been treated for these symptoms before:   [x] Yes  [] No    If a prescription is needed, what is your preferred pharmacy and phone number:  [facility]  [address]  [phone]    Additional notes: PATIENTS  ROMANA IS CALLING IN STATING THAT THE PATIENT IS HAVING A LOT OF SINUS DRAINAGE AND NEEDS TO HAVE A MEDICATION CALLED IN.

## 2022-12-09 RX ORDER — AMLODIPINE BESYLATE 10 MG/1
TABLET ORAL
Qty: 90 TABLET | Refills: 1 | Status: SHIPPED | OUTPATIENT
Start: 2022-12-09

## 2022-12-09 RX ORDER — OMEPRAZOLE 40 MG/1
CAPSULE, DELAYED RELEASE ORAL
Qty: 90 CAPSULE | Refills: 1 | Status: SHIPPED | OUTPATIENT
Start: 2022-12-09

## 2022-12-09 RX ORDER — BUDESONIDE 0.5 MG/2ML
0.5 INHALANT ORAL
Qty: 120 ML | Refills: 0 | Status: SHIPPED | OUTPATIENT
Start: 2022-12-09 | End: 2023-01-03

## 2022-12-09 RX ORDER — IPRATROPIUM BROMIDE AND ALBUTEROL SULFATE 2.5; .5 MG/3ML; MG/3ML
3 SOLUTION RESPIRATORY (INHALATION)
Qty: 270 ML | Refills: 0 | Status: SHIPPED | OUTPATIENT
Start: 2022-12-09 | End: 2023-01-03

## 2022-12-09 RX ORDER — CARVEDILOL 6.25 MG/1
TABLET ORAL
Qty: 180 TABLET | Refills: 2 | Status: SHIPPED | OUTPATIENT
Start: 2022-12-09

## 2022-12-09 RX ORDER — SERTRALINE HYDROCHLORIDE 100 MG/1
TABLET, FILM COATED ORAL
Qty: 90 TABLET | Refills: 1 | Status: SHIPPED | OUTPATIENT
Start: 2022-12-09

## 2022-12-09 NOTE — TELEPHONE ENCOUNTER
Incoming Refill Request      Medication requested (name and dose):   budesonide (PULMICORT) 0.5 MG/2ML nebulizer solution ()  0.5 mg, 2 Times Daily - RT     ipratropium-albuterol (DUO-NEB) 0.5-2.5 mg/3 ml nebulizer ()  3 mL, Every 6 Hours While Awake - RT         Pharmacy where request should be sent: [facility]/pharmacy #[id number] - [place], [place] - [address] [place] [address] AT [facility] - [phone]  - [phone]   [phone]    Additional details provided by patient: PATIENT'S  SAID HE HAS NOT BEEN ABLE TO GET EITHER OF THESE MEDICATIONS FILLED YET    Best call back number: [phone]    Does the patient have less than a 3 day supply:  [x] Yes  [] No    Harrison Gimenez Rep  22, 09:00 EST

## 2022-12-10 ENCOUNTER — APPOINTMENT (OUTPATIENT)
Dept: GENERAL RADIOLOGY | Facility: HOSPITAL | Age: 67
End: 2022-12-10

## 2022-12-10 ENCOUNTER — HOSPITAL ENCOUNTER (EMERGENCY)
Facility: HOSPITAL | Age: 67
Discharge: HOME | End: 2022-12-10
Attending: EMERGENCY MEDICINE | Admitting: EMERGENCY MEDICINE

## 2022-12-10 VITALS
OXYGEN SATURATION: 98 % | HEART RATE: 54 BPM | TEMPERATURE: 99 F | SYSTOLIC BLOOD PRESSURE: 159 MMHG | RESPIRATION RATE: 16 BRPM | DIASTOLIC BLOOD PRESSURE: 81 MMHG

## 2022-12-10 DIAGNOSIS — J44.1 COPD EXACERBATION: ICD-10-CM

## 2022-12-10 DIAGNOSIS — J10.1 INFLUENZA A: Primary | ICD-10-CM

## 2022-12-10 LAB
ALBUMIN SERPL-MCNC: 4 G/DL (ref 3.5–5.2)
ALBUMIN/GLOB SERPL: 1.4 G/DL
ALP SERPL-CCNC: 75 U/L (ref 39–117)
ALT SERPL W P-5'-P-CCNC: 14 U/L (ref 1–33)
ANION GAP SERPL CALCULATED.3IONS-SCNC: 13 MMOL/L (ref 5–15)
AST SERPL-CCNC: 36 U/L (ref 1–32)
B PARAPERT DNA SPEC QL NAA+PROBE: NOT DETECTED
B PERT DNA SPEC QL NAA+PROBE: NOT DETECTED
BASOPHILS # BLD AUTO: 0.02 10*3/MM3 (ref 0–0.2)
BASOPHILS NFR BLD AUTO: 0.3 % (ref 0–1.5)
BILIRUB SERPL-MCNC: 0.7 MG/DL (ref 0–1.2)
BUN SERPL-MCNC: 8 MG/DL (ref 8–23)
BUN/CREAT SERPL: 9 (ref 7–25)
C PNEUM DNA NPH QL NAA+NON-PROBE: NOT DETECTED
CALCIUM SPEC-SCNC: 9.3 MG/DL (ref 8.6–10.5)
CHLORIDE SERPL-SCNC: 93 MMOL/L (ref 98–107)
CO2 SERPL-SCNC: 31 MMOL/L (ref 22–29)
CREAT SERPL-MCNC: 0.89 MG/DL (ref 0.57–1)
D-LACTATE SERPL-SCNC: 0.8 MMOL/L (ref 0.5–2)
DEPRECATED RDW RBC AUTO: 39.3 FL (ref 37–54)
EGFRCR SERPLBLD CKD-EPI 2021: 70.3 ML/MIN/1.73
EOSINOPHIL # BLD AUTO: 0.04 10*3/MM3 (ref 0–0.4)
EOSINOPHIL NFR BLD AUTO: 0.7 % (ref 0.3–6.2)
ERYTHROCYTE [DISTWIDTH] IN BLOOD BY AUTOMATED COUNT: 13 % (ref 12.3–15.4)
FLUAV H3 RNA NPH QL NAA+PROBE: DETECTED
FLUBV RNA ISLT QL NAA+PROBE: NOT DETECTED
GLOBULIN UR ELPH-MCNC: 2.8 GM/DL
GLUCOSE SERPL-MCNC: 106 MG/DL (ref 65–99)
HADV DNA SPEC NAA+PROBE: NOT DETECTED
HCOV 229E RNA SPEC QL NAA+PROBE: NOT DETECTED
HCOV HKU1 RNA SPEC QL NAA+PROBE: NOT DETECTED
HCOV NL63 RNA SPEC QL NAA+PROBE: NOT DETECTED
HCOV OC43 RNA SPEC QL NAA+PROBE: NOT DETECTED
HCT VFR BLD AUTO: 35.6 % (ref 34–46.6)
HGB BLD-MCNC: 11.6 G/DL (ref 12–15.9)
HMPV RNA NPH QL NAA+NON-PROBE: NOT DETECTED
HOLD SPECIMEN: NORMAL
HOLD SPECIMEN: NORMAL
HPIV1 RNA ISLT QL NAA+PROBE: NOT DETECTED
HPIV2 RNA SPEC QL NAA+PROBE: NOT DETECTED
HPIV3 RNA NPH QL NAA+PROBE: NOT DETECTED
HPIV4 P GENE NPH QL NAA+PROBE: NOT DETECTED
IMM GRANULOCYTES # BLD AUTO: 0.04 10*3/MM3 (ref 0–0.05)
IMM GRANULOCYTES NFR BLD AUTO: 0.7 % (ref 0–0.5)
LYMPHOCYTES # BLD AUTO: 1.57 10*3/MM3 (ref 0.7–3.1)
LYMPHOCYTES NFR BLD AUTO: 27.3 % (ref 19.6–45.3)
M PNEUMO IGG SER IA-ACNC: NOT DETECTED
MCH RBC QN AUTO: 27.5 PG (ref 26.6–33)
MCHC RBC AUTO-ENTMCNC: 32.6 G/DL (ref 31.5–35.7)
MCV RBC AUTO: 84.4 FL (ref 79–97)
MONOCYTES # BLD AUTO: 0.4 10*3/MM3 (ref 0.1–0.9)
MONOCYTES NFR BLD AUTO: 7 % (ref 5–12)
NEUTROPHILS NFR BLD AUTO: 3.68 10*3/MM3 (ref 1.7–7)
NEUTROPHILS NFR BLD AUTO: 64 % (ref 42.7–76)
NRBC BLD AUTO-RTO: 0 /100 WBC (ref 0–0.2)
NT-PROBNP SERPL-MCNC: 365 PG/ML (ref 0–900)
PLATELET # BLD AUTO: 193 10*3/MM3 (ref 140–450)
PMV BLD AUTO: 10.6 FL (ref 6–12)
POTASSIUM SERPL-SCNC: 3.5 MMOL/L (ref 3.5–5.2)
PROCALCITONIN SERPL-MCNC: <0.02 NG/ML (ref 0–0.25)
PROT SERPL-MCNC: 6.8 G/DL (ref 6–8.5)
RBC # BLD AUTO: 4.22 10*6/MM3 (ref 3.77–5.28)
RHINOVIRUS RNA SPEC NAA+PROBE: NOT DETECTED
RSV RNA NPH QL NAA+NON-PROBE: NOT DETECTED
SARS-COV-2 RNA NPH QL NAA+NON-PROBE: NOT DETECTED
SODIUM SERPL-SCNC: 137 MMOL/L (ref 136–145)
TROPONIN T SERPL-MCNC: <0.01 NG/ML (ref 0–0.03)
WBC NRBC COR # BLD: 5.75 10*3/MM3 (ref 3.4–10.8)
WHOLE BLOOD HOLD COAG: NORMAL
WHOLE BLOOD HOLD SPECIMEN: NORMAL

## 2022-12-10 PROCEDURE — 71045 X-RAY EXAM CHEST 1 VIEW: CPT

## 2022-12-10 PROCEDURE — 25010000002 METHYLPREDNISOLONE PER 125 MG: Performed by: PHYSICIAN ASSISTANT

## 2022-12-10 PROCEDURE — 93005 ELECTROCARDIOGRAM TRACING: CPT | Performed by: PHYSICIAN ASSISTANT

## 2022-12-10 PROCEDURE — 94799 UNLISTED PULMONARY SVC/PX: CPT

## 2022-12-10 PROCEDURE — 80053 COMPREHEN METABOLIC PANEL: CPT | Performed by: PHYSICIAN ASSISTANT

## 2022-12-10 PROCEDURE — 94640 AIRWAY INHALATION TREATMENT: CPT

## 2022-12-10 PROCEDURE — 0202U NFCT DS 22 TRGT SARS-COV-2: CPT | Performed by: PHYSICIAN ASSISTANT

## 2022-12-10 PROCEDURE — 84145 PROCALCITONIN (PCT): CPT | Performed by: PHYSICIAN ASSISTANT

## 2022-12-10 PROCEDURE — 93010 ELECTROCARDIOGRAM REPORT: CPT | Performed by: INTERNAL MEDICINE

## 2022-12-10 PROCEDURE — 94664 DEMO&/EVAL PT USE INHALER: CPT

## 2022-12-10 PROCEDURE — 83605 ASSAY OF LACTIC ACID: CPT | Performed by: PHYSICIAN ASSISTANT

## 2022-12-10 PROCEDURE — 85025 COMPLETE CBC W/AUTO DIFF WBC: CPT | Performed by: PHYSICIAN ASSISTANT

## 2022-12-10 PROCEDURE — 96374 THER/PROPH/DIAG INJ IV PUSH: CPT

## 2022-12-10 PROCEDURE — 83880 ASSAY OF NATRIURETIC PEPTIDE: CPT | Performed by: PHYSICIAN ASSISTANT

## 2022-12-10 PROCEDURE — 94761 N-INVAS EAR/PLS OXIMETRY MLT: CPT

## 2022-12-10 PROCEDURE — 84484 ASSAY OF TROPONIN QUANT: CPT | Performed by: PHYSICIAN ASSISTANT

## 2022-12-10 PROCEDURE — 99284 EMERGENCY DEPT VISIT MOD MDM: CPT

## 2022-12-10 RX ORDER — METHYLPREDNISOLONE SODIUM SUCCINATE 125 MG/2ML
80 INJECTION, POWDER, LYOPHILIZED, FOR SOLUTION INTRAMUSCULAR; INTRAVENOUS ONCE
Status: COMPLETED | OUTPATIENT
Start: 2022-12-10 | End: 2022-12-10

## 2022-12-10 RX ORDER — PREDNISONE 20 MG/1
40 TABLET ORAL DAILY
Qty: 10 TABLET | Refills: 0 | Status: SHIPPED | OUTPATIENT
Start: 2022-12-10 | End: 2022-12-15

## 2022-12-10 RX ORDER — IPRATROPIUM BROMIDE AND ALBUTEROL SULFATE 2.5; .5 MG/3ML; MG/3ML
6 SOLUTION RESPIRATORY (INHALATION) ONCE
Status: COMPLETED | OUTPATIENT
Start: 2022-12-10 | End: 2022-12-10

## 2022-12-10 RX ORDER — SODIUM CHLORIDE 0.9 % (FLUSH) 0.9 %
10 SYRINGE (ML) INJECTION AS NEEDED
Status: DISCONTINUED | OUTPATIENT
Start: 2022-12-10 | End: 2022-12-10 | Stop reason: HOSPADM

## 2022-12-10 RX ADMIN — METHYLPREDNISOLONE SODIUM SUCCINATE 80 MG: 125 INJECTION, POWDER, FOR SOLUTION INTRAMUSCULAR; INTRAVENOUS at 15:54

## 2022-12-10 RX ADMIN — IPRATROPIUM BROMIDE AND ALBUTEROL SULFATE 6 ML: 2.5; .5 SOLUTION RESPIRATORY (INHALATION) at 15:16

## 2022-12-10 NOTE — ED TRIAGE NOTES
Cough, fever to 102.  It hurts when she coughs.  She has been on abx .  She has copd.  She is 92% on 2L O2.  She is O2 at baseline    Patient was placed in face mask during first look triage.  Patient was wearing a face mask throughout encounter.  I wore personal protective equipment throughout the encounter.  Hand hygiene was performed before and after patient encounter.

## 2022-12-10 NOTE — DISCHARGE INSTRUCTIONS
Although you are being discharged from the ED today, I encourage you to return for worsening symptoms. Things can, and do, change such that treatment at home with medication may not be adequate. Specifically I recommend returning for chest pain or discomfort, difficulty breathing, persistent vomiting or difficulty holding down liquids or medications, fever > 102.0 F or any other worsening or alarming symptoms.     Rest. Drink plenty of fluids.  Follow up with primary care provider for further management and to have blood pressure rechecked.  Call [phone] to get established with a new primary care provider if you do not already have one.  Take your medications as prescribed.

## 2022-12-10 NOTE — ED PROVIDER NOTES
EMERGENCY DEPARTMENT ENCOUNTER    Room Number: 07/07  Date seen: 12/10/2022  Time seen: 14:41 EST  PCP: Javan Martinez MD    Spoken Language:  English  Language interpretation services not needed     CHIEF COMPLAINT: shortness of breath    HPI: Paz Browne is a 67 y.o. female with PMH of COPD, CHF, paroxysmal a fib, HTN, HLD presenting to the ED for evaluation of shortness of breath. The history is being obtained by the patient and by review of the medical chart.  The patient presents to the emergency department complaining of worsening shortness of breath over the past 4 to 5 days.  The patient has COPD and is on 2L nasal cannula at all times.  Patient states that she has had a dry cough as well as a fever.  2 days ago her temperature reached 102F.  The patient states that she has had some nausea and had one episode of vomiting earlier today.  She states that her abdomen and chest are sore from coughing, but denies any other chest pain or shortness of breath.  She denies sore throat or diarrhea.  The patient has a rescue nebulizer at home, but has not tried using it.  She was started on a Z-Antonio 2 days ago, but states that so far it is not helped her symptoms.    MEDICAL RECORD REVIEW:  Reviewed in [employer].     PAST MEDICAL HISTORY  Past Medical History:   Diagnosis Date   • VAN (acute kidney injury) (HCC)    • Anemia    • Asthma    • Atrial flutter (HCC)     cardioversion   • Cataract    • Celiac artery stenosis (HCC)    • Chronic respiratory failure with hypoxia (HCC)    • Colon polyp    • COPD (chronic obstructive pulmonary disease) (HCC)    • GI bleed    • Hiatal hernia    • History of CHF (congestive heart failure)     due to MR   • History of home oxygen therapy     3 lpm NC   • History of mitral valve replacement with tissue graft    • Hyperlipidemia    • Hypertension    • Infectious viral hepatitis     B   • Intertrigo    • Long term (current) use of anticoagulants    • Mitral regurgitation     s/p  tissue MVR   • PAF (paroxysmal atrial fibrillation) ([facility])     s/p MAZE   • Pneumonia    • Pulmonary hypertension (HCC)    • S/P TVR (tricuspid valve repair) 7/7/2016       PAST SURGICAL HISTORY  Past Surgical History:   Procedure Laterality Date   • CARDIAC CATHETERIZATION  09/01/2014    Right dominant systemt, normal coronary arteries.    • CARDIAC CATHETERIZATION Left 6/10/2016    Procedure: Cardiac catheterization;  Surgeon: Sergei Hall MD;  Location: [facility] CATH INVASIVE LOCATION;  Service:    • CARDIAC CATHETERIZATION N/A 6/10/2016    Procedure: Right Heart Cath;  Surgeon: Sergei Hall MD;  Location: [facility] CATH INVASIVE LOCATION;  Service:    • CATARACT EXTRACTION     • COLONOSCOPY     • COLONOSCOPY N/A 8/4/2017    Procedure: COLONOSCOPY TO CECUM/TI WITH POLYPECTOMY ( COLD BX);  Surgeon: Cleveland Devine MD;  Location: [facility] ENDOSCOPY;  Service:    • COLONOSCOPY N/A 8/10/2017    Procedure: COLONOSCOPY to cecum and TI with 2 clips placed at transverse;  Surgeon: Earnest PALOMO MD;  Location: [facility] ENDOSCOPY;  Service:    • COLONOSCOPY N/A 12/22/2017    Procedure: COLONOSCOPY INTO CECUM WITH COLD POLYPECTOMIES;  Surgeon: Cleveland Devine MD;  Location: [facility] ENDOSCOPY;  Service:    • COLONOSCOPY N/A 5/17/2021    Procedure: COLONOSCOPY to cecum;  Surgeon: Cleveland Devine MD;  Location: [facility] ENDOSCOPY;  Service: Gastroenterology;  Laterality: N/A;  Pre: Fe deficency anemia, h/x of polyps  Post: fair prep, normal   • ENDOSCOPY N/A 8/17/2017    Procedure: ESOPHAGOGASTRODUODENOSCOPY;  Surgeon: Porsha Ruby MD;  Location: [facility] ENDOSCOPY;  Service:    • ENDOSCOPY N/A 12/22/2017    Procedure: ESOPHAGOGASTRODUODENOSCOPY WITH BIOPSIES;  Surgeon: Cleveland Devine MD;  Location: [facility] ENDOSCOPY;  Service:    • ENDOSCOPY N/A 5/17/2021    Procedure: ESOPHAGOGASTRODUODENOSCOPY  with biopsies;  Surgeon: Cleveland Devine MD;  Location: [facility] ENDOSCOPY;  Service: Gastroenterology;  Laterality: N/A;  Pre: Fe deficency anemia,  nausea, heme positive stool   Post: gastritis, sloughing of distal esophagus mucosa   • GALLBLADDER SURGERY     • HEMORRHOIDECTOMY     • HYSTERECTOMY     • KIDNEY SURGERY  04/22/2013    Stent placement   • MAZE PROCEDURE     • MITRAL VALVE REPLACEMENT     • TONSILLECTOMY     • TRICUSPID VALVE REPLACEMENT         FAMILY HISTORY  Family History   Adopted: Yes   Problem Relation Age of Onset   • No Known Problems Mother    • No Known Problems Father        SOCIAL HISTORY  Social History     Socioeconomic History   • Marital status:    • Number of children: 2   Tobacco Use   • Smoking status: Former     Packs/day: 3.00     Years: 54.00     Pack years: 162.00     Types: Cigarettes     Quit date: 2007     Years since quitting: 15.9   • Smokeless tobacco: Never   • Tobacco comments:     caffeine - tea or mt dew    Vaping Use   • Vaping Use: Never used   Substance and Sexual Activity   • Alcohol use: No   • Drug use: No   • Sexual activity: Defer       CURRENT MEDICATIONS  Prior to Admission medications    Medication Sig Start Date End Date Taking? Authorizing Provider   albuterol (PROVENTIL) (2.5 MG/3ML) 0.083% nebulizer solution USE 1 VIAL VIA NEBULIZER EVERY FOUR HOURS AS NEEDED FOR WHEEZING 3/21/22   Javan Martinez MD   albuterol sulfate  (90 Base) MCG/ACT inhaler Inhale 2 puffs Every 4 (Four) Hours As Needed for Wheezing. 12/1/20   Javan Martinez MD   amLODIPine (NORVASC) 10 MG tablet TAKE 1 TABLET BY MOUTH EVERY DAY 12/9/22   Javan Martinez MD   aspirin 81 MG EC tablet Take 1 tablet by mouth Daily. 8/25/17   Dominique Ponce MD   atorvastatin (LIPITOR) 40 MG tablet TAKE 1 TABLET BY MOUTH EVERY DAY 11/21/22   Javan Martinez MD   azithromycin (Zithromax Z-Antonio) 250 MG tablet Take 2 tablets by mouth on day 1, then 1 tablet daily on days 2-5 12/7/22   Javan Martinez MD   benzonatate (TESSALON) 100 MG capsule TAKE 1 CAPSULE BY MOUTH 2 TIMES A DAY AS NEEDED FOR COUGH 6/7/21   Javan Martinez MD    budesonide (PULMICORT) 0.5 MG/2ML nebulizer solution Take 2 mL by nebulization 2 (Two) Times a Day for 30 days. Indications: Chronic Obstructive Lung Disease, Chronic hypoxic respiratory failure 12/9/22 1/8/23  Javan Martinez MD   butalbital-acetaminophen-caffeine (FIORICET, ESGIC) -40 MG per tablet Take 1 tablet by mouth Every 6 (Six) Hours As Needed for Headache. 7/7/21   Sonny Burroughs MD   carvedilol (COREG) 6.25 MG tablet TAKE 1 TABLET BY MOUTH TWICE A DAY WITH MEALS 12/9/22   Melanie Sykes APRN   cyclobenzaprine (FLEXERIL) 10 MG tablet Take 1 tablet by mouth every night at bedtime. 2/1/22   Javan Martinez MD   fluconazole (Diflucan) 100 MG tablet Take 1 tablet by mouth Daily. 10/26/22   Javan Martinez MD   furosemide (LASIX) 40 MG tablet TAKE 1 TABLET BY MOUTH TWICE A DAY 11/14/22   Javan Martinez MD   HYDROcodone-acetaminophen (NORCO) 5-325 MG per tablet Take 1 tablet by mouth Every 8 (Eight) Hours As Needed for Moderate Pain. Chronic pain medicine for low back pain 11/17/22   Javan Martinez MD   ipratropium-albuterol (DUO-NEB) 0.5-2.5 mg/3 ml nebulizer Take 3 mL by nebulization Every 6 (Six) Hours While Awake for 30 days. Indications: Chronic Obstructive Lung Disease, COPD, chronic hypoxic respiratory failure. 12/9/22 1/8/23  Javan Martinez MD   lisinopril (PRINIVIL,ZESTRIL) 40 MG tablet Take 40 mg by mouth Daily. 6/5/21   Paresh Olivera MD   loratadine (CLARITIN) 10 MG tablet Take 10 mg by mouth 2 (two) times a day.    Paresh Olivera MD   Magnesium Oxide 400 (240 Mg) MG tablet TAKE 1 TABLET BY MOUTH EVERY DAY 8/9/22   Javan Martinez MD   meclizine (ANTIVERT) 25 MG tablet Take 1 tablet by mouth 3 (Three) Times a Day As Needed for Dizziness. prn 10/6/20   Javan Martinez MD   Multiple Vitamins-Minerals (MULTIVITAL) tablet Take 1 tablet by mouth Daily.    Provider, MD Paresh   Nebulizer device 1 Units 4 (Four) Times a Day As Needed (Wheezing). J44.1  j20.8 9/23/20   Javan Martinez MD   O2 (OXYGEN) Inhale 3 L/min Continuous.    Paresh Olivera MD   Omega-3 Fatty Acids (fish oil) 1000 MG capsule capsule Take  by mouth Every Night.    Paresh Olivera MD   omeprazole (priLOSEC) 40 MG capsule TAKE 1 CAPSULE BY MOUTH EVERY DAY 12/9/22   Javan Martinez MD   ondansetron (ZOFRAN) 4 MG tablet Take 1 tablet by mouth Every 12 (Twelve) Hours As Needed for Nausea or Vomiting. 10/25/22   Javan Martinez MD   polyethylene glycol (MIRALAX) packet Take 17 g by mouth 2 (Two) Times a Day. 8/25/17   Dominique Ponce MD   potassium chloride 10 MEQ CR tablet Take 1 tablet by mouth Daily. 9/18/22   Sergei Sloan MD   roflumilast (DALIRESP) 500 MCG tablet tablet Take 1 tablet by mouth Daily. 10/26/22   Javan Martinez MD   rOPINIRole (REQUIP) 2 MG tablet TAKE 1 TABLET BY MOUTH EVERY NIGHT 6/2/22   Javan Martinez MD   sertraline (ZOLOFT) 100 MG tablet TAKE 1 TABLET BY MOUTH EVERY DAY 12/9/22   Black Campbell MD   Symjepi 0.3 MG/0.3ML solution prefilled syringe  7/6/21   ProviderParesh MD   zaleplon (SONATA) 10 MG capsule Take 1 capsule by mouth Every Night. 9/27/22   Javan Martinez MD       ALLERGIES  Bupropion, Cephalexin, and Metoprolol    REVIEW OF SYSTEMS  All systems reviewed and negative except for those discussed in HPI.     PHYSICAL EXAM  ED Triage Vitals [12/10/22 1434]   Temp Heart Rate Resp BP SpO2   99 °F (37.2 °C) 79 16 139/78 92 %      Temp src Heart Rate Source Patient Position BP Location FiO2 (%)   Tympanic Monitor -- -- --       Physical Exam  Constitutional:       Appearance: Normal appearance.   HENT:      Head: Normocephalic and atraumatic.      Mouth/Throat:      Mouth: Mucous membranes are moist.   Eyes:      Extraocular Movements: Extraocular movements intact.      Pupils: Pupils are equal, round, and reactive to light.   Cardiovascular:      Rate and Rhythm: Normal rate and regular rhythm.   Pulmonary:      Effort: Pulmonary  effort is normal.      Breath sounds: Wheezing (expiratory wheezing throughout bilateral lungs) present.   Abdominal:      General: There is no distension.      Palpations: Abdomen is soft.      Tenderness: There is no abdominal tenderness.   Musculoskeletal:      Cervical back: Normal range of motion.   Skin:     General: Skin is warm and dry.   Neurological:      General: No focal deficit present.      Mental Status: She is alert and oriented to person, place, and time.   Psychiatric:         Mood and Affect: Mood normal.         Behavior: Behavior normal.         Thought Content: Thought content normal.         Judgment: Judgment normal.       PROCEDURES  Procedures  None    LABS  Recent Results (from the past 24 hour(s))   ECG 12 Lead Dyspnea    Collection Time: 12/10/22  3:03 PM   Result Value Ref Range    QT Interval 427 ms   Respiratory Panel PCR w/COVID-19(SARS-CoV-2) KAJAL/MARILIN/DEBBIE/PAD/COR/MAD/DAVID In-House, NP Swab in UTM/VTM, 3-4 HR TAT - Swab, Nasopharynx    Collection Time: 12/10/22  3:18 PM    Specimen: Nasopharynx; Swab   Result Value Ref Range    ADENOVIRUS, PCR Not Detected Not Detected    Coronavirus 229E Not Detected Not Detected    Coronavirus HKU1 Not Detected Not Detected    Coronavirus NL63 Not Detected Not Detected    Coronavirus OC43 Not Detected Not Detected    COVID19 Not Detected Not Detected - Ref. Range    Human Metapneumovirus Not Detected Not Detected    Human Rhinovirus/Enterovirus Not Detected Not Detected    Influenza A H3 Detected (A) Not Detected    Influenza B PCR Not Detected Not Detected    Parainfluenza Virus 1 Not Detected Not Detected    Parainfluenza Virus 2 Not Detected Not Detected    Parainfluenza Virus 3 Not Detected Not Detected    Parainfluenza Virus 4 Not Detected Not Detected    RSV, PCR Not Detected Not Detected    Bordetella pertussis pcr Not Detected Not Detected    Bordetella parapertussis PCR Not Detected Not Detected    Chlamydophila pneumoniae PCR Not Detected  Not Detected    Mycoplasma pneumo by PCR Not Detected Not Detected   Comprehensive Metabolic Panel    Collection Time: 12/10/22  3:56 PM    Specimen: Blood   Result Value Ref Range    Glucose 106 (H) 65 - 99 mg/dL    BUN 8 8 - 23 mg/dL    Creatinine 0.89 0.57 - 1.00 mg/dL    Sodium 137 136 - 145 mmol/L    Potassium 3.5 3.5 - 5.2 mmol/L    Chloride 93 (L) 98 - 107 mmol/L    CO2 31.0 (H) 22.0 - 29.0 mmol/L    Calcium 9.3 8.6 - 10.5 mg/dL    Total Protein 6.8 6.0 - 8.5 g/dL    Albumin 4.00 3.50 - 5.20 g/dL    ALT (SGPT) 14 1 - 33 U/L    AST (SGOT) 36 (H) 1 - 32 U/L    Alkaline Phosphatase 75 39 - 117 U/L    Total Bilirubin 0.7 0.0 - 1.2 mg/dL    Globulin 2.8 gm/dL    A/G Ratio 1.4 g/dL    BUN/Creatinine Ratio 9.0 7.0 - 25.0    Anion Gap 13.0 5.0 - 15.0 mmol/L    eGFR 70.3 >60.0 mL/min/1.73   BNP    Collection Time: 12/10/22  3:56 PM    Specimen: Blood   Result Value Ref Range    proBNP 365.0 0.0 - 900.0 pg/mL   Troponin    Collection Time: 12/10/22  3:56 PM    Specimen: Blood   Result Value Ref Range    Troponin T <0.010 0.000 - 0.030 ng/mL   Procalcitonin    Collection Time: 12/10/22  3:56 PM    Specimen: Blood   Result Value Ref Range    Procalcitonin <0.02 0.00 - 0.25 ng/mL   Lactic Acid, Plasma    Collection Time: 12/10/22  3:56 PM    Specimen: Blood   Result Value Ref Range    Lactate 0.8 0.5 - 2.0 mmol/L   Green Top (Gel)    Collection Time: 12/10/22  3:56 PM   Result Value Ref Range    Extra Tube Hold for add-ons.    Lavender Top    Collection Time: 12/10/22  3:56 PM   Result Value Ref Range    Extra Tube hold for add-on    Gold Top - SST    Collection Time: 12/10/22  3:56 PM   Result Value Ref Range    Extra Tube Hold for add-ons.    Light Blue Top    Collection Time: 12/10/22  3:56 PM   Result Value Ref Range    Extra Tube Hold for add-ons.    CBC Auto Differential    Collection Time: 12/10/22  3:56 PM    Specimen: Blood   Result Value Ref Range    WBC 5.75 3.40 - 10.80 10*3/mm3    RBC 4.22 3.77 - 5.28  10*6/mm3    Hemoglobin 11.6 (L) 12.0 - 15.9 g/dL    Hematocrit 35.6 34.0 - 46.6 %    MCV 84.4 79.0 - 97.0 fL    MCH 27.5 26.6 - 33.0 pg    MCHC 32.6 31.5 - 35.7 g/dL    RDW 13.0 12.3 - 15.4 %    RDW-SD 39.3 37.0 - 54.0 fl    MPV 10.6 6.0 - 12.0 fL    Platelets 193 140 - 450 10*3/mm3    Neutrophil % 64.0 42.7 - 76.0 %    Lymphocyte % 27.3 19.6 - 45.3 %    Monocyte % 7.0 5.0 - 12.0 %    Eosinophil % 0.7 0.3 - 6.2 %    Basophil % 0.3 0.0 - 1.5 %    Immature Grans % 0.7 (H) 0.0 - 0.5 %    Neutrophils, Absolute 3.68 1.70 - 7.00 10*3/mm3    Lymphocytes, Absolute 1.57 0.70 - 3.10 10*3/mm3    Monocytes, Absolute 0.40 0.10 - 0.90 10*3/mm3    Eosinophils, Absolute 0.04 0.00 - 0.40 10*3/mm3    Basophils, Absolute 0.02 0.00 - 0.20 10*3/mm3    Immature Grans, Absolute 0.04 0.00 - 0.05 10*3/mm3    nRBC 0.0 0.0 - 0.2 /100 WBC       RADIOLOGY  XR Chest 1 View   Final Result          MEDICATIONS GIVEN IN THE ER  Medications   sodium chloride 0.9 % flush 10 mL (has no administration in time range)   ipratropium-albuterol (DUO-NEB) nebulizer solution 6 mL (6 mL Nebulization Given 12/10/22 1516)   methylPREDNISolone sodium succinate (SOLU-Medrol) injection 80 mg (80 mg Intravenous Given 12/10/22 1554)       MEDICAL DECISION MAKING, CONSULTS AND PROGRESS NOTES  All labs and all radiology studies were have been viewed and interpreted by me.   Discussion below represents my analysis of pertinent findings related to patient's condition, differential diagnosis, treatment plan and final disposition.    Differential diagnosis includes but is not limited to:  -COVID  -CHF  -acute coronary syndrome  -pulmonary embolism  -pneumothorax  -pneumonia  -asthma/COPD  -deconditioning  -anemia  -anxiety     PPE: The patient was placed in a face mask in first look. Patient was wearing facemask when I entered the room and throughout our encounter. I wore full protective equipment throughout this patient encounter including a face mask, eye protection and  gloves. Hand hygiene was performed before donning protective equipment and after removal when leaving the room.    AS OF 17:31 EST VITALS:    BP - 164/77  HR - 74  TEMP - 99 °F (37.2 °C) (Tympanic)  O2 SATS - 98%         Patient rechecked.  Subjectively, she feels better after breathing treatments and would like to go home.  We will send her with a prescription for prednisone.    The patient's history, physical exam, and lab findings were discussed with the physician, who also performed a face to face history and physical exam.  I discussed all results and noted any abnormalities with patient.  Discussed absoute need to recheck abnormalities with their family physician.  I answered any of the patient's questions.  Discussed plan for discharge, as there is no emergent indication for admission.  Pt is agreeable and understands need for follow up and repeat testing.  Pt is aware that discharge does not mean that nothing is wrong but it indicates no emergency is present and they must continue care with their family physician.  Pt is discharged with instructions to follow up with primary care doctor to have their blood pressure rechecked.     DIAGNOSIS   Diagnosis Plan   1. Influenza A        2. COPD exacerbation (HCC)            DISPOSITION  ED Disposition     ED Disposition   Discharge    Condition   Stable    Comment   --             FOLLOW UP  Javan Martinez MD  [address] [id number] [phone]    Schedule an appointment as soon as possible for a visit         RX  Medications   sodium chloride 0.9 % flush 10 mL (has no administration in time range)   ipratropium-albuterol (DUO-NEB) nebulizer solution 6 mL (6 mL Nebulization Given 12/10/22 1176)   methylPREDNISolone sodium succinate (SOLU-Medrol) injection 80 mg (80 mg Intravenous Given 12/10/22 9354)          Medication List      New Prescriptions    predniSONE 20 MG tablet  Commonly known as: DELTASONE  Take 2 tablets by mouth Daily for 5  days.           Where to Get Your Medications      These medications were sent to [facility]/pharmacy #[id number] - [place], [place] - [address] AT [facility] - [phone]  - [phone] [address] [address]    Hours: 24-hours Phone: [phone]   · predniSONE 20 MG tablet       Provider Attestation:  I personally reviewed the past medical history, past surgical history, social history, family history, current medications and allergies as they appear in the chart. I reviewed the patient's history, physical, lab/imaging results and overall care with Dr. Newby who is in agreement with the patient's treatment plan.    EMR Dragon/Transcription disclaimer:  Dictated using Dragon dictation    Provider note signed by:         Karen Griggs PA  12/10/22 1969

## 2022-12-10 NOTE — ED PROVIDER NOTES
MD ATTESTATION NOTE    The MILAN and I have discussed this patient's history, physical exam, and treatment plan.  I have reviewed the documentation and personally had a face to face interaction with the patient. I affirm the documentation and agree with the treatment and plan.  The attached note describes my personal findings.      I provided a substantive portion of the care of the patient.  I personally performed the physical exam in its entirety, and below are my findings.  For this patient encounter, the patient wore surgical mask, I wore full protective PPE including N95 and eye protection.      Brief HPI: This patient is a 67-year-old female with a history of CHF and COPD presenting to the emergency room today secondary to shortness of breath.  She also complains of associated fevers and chills as well as a cough with the symptoms.    PHYSICAL EXAM  ED Triage Vitals [12/10/22 1434]   Temp Heart Rate Resp BP SpO2   99 °F (37.2 °C) 79 16 139/78 92 %      Temp src Heart Rate Source Patient Position BP Location FiO2 (%)   Tympanic Monitor -- -- --         GENERAL: In mild distress, nontoxic in appearance  HENT: nares patent  EYES: no scleral icterus  CV: regular rhythm, normal rate, no M/R/G  RESPIRATORY: normal effort, diffuse expiratory wheezes  ABDOMEN: soft, nontender, no rebound or guarding  MUSCULOSKELETAL: no deformity, no edema  NEURO: alert, moves all extremities, follows commands  PSYCH:  calm, cooperative  SKIN: warm, dry    Vital signs and nursing notes reviewed.        Plan: We will obtain an EKG, labs, RVP with COVID-19 testing, as well as chest x-ray in the ED and treat with nebulizer treatments as we await the work-up.  We will monitor closely and reassess following.       Teo Newby MD  12/10/22 2050

## 2022-12-11 LAB — QT INTERVAL: 427 MS

## 2022-12-16 ENCOUNTER — TELEPHONE (OUTPATIENT)
Dept: FAMILY MEDICINE CLINIC | Facility: CLINIC | Age: 67
End: 2022-12-16

## 2022-12-16 DIAGNOSIS — Z79.891 LONG TERM CURRENT USE OF OPIATE ANALGESIC: ICD-10-CM

## 2022-12-16 RX ORDER — OSELTAMIVIR PHOSPHATE 75 MG/1
75 CAPSULE ORAL 2 TIMES DAILY
Qty: 10 CAPSULE | Refills: 0 | Status: SHIPPED | OUTPATIENT
Start: 2022-12-16 | End: 2023-03-24

## 2022-12-16 RX ORDER — HYDROCODONE BITARTRATE AND ACETAMINOPHEN 5; 325 MG/1; MG/1
1 TABLET ORAL EVERY 8 HOURS PRN
Qty: 90 TABLET | Refills: 0 | Status: SHIPPED | OUTPATIENT
Start: 2022-12-16 | End: 2023-01-19 | Stop reason: SDUPTHER

## 2022-12-16 RX ORDER — METHYLPREDNISOLONE 4 MG/1
TABLET ORAL
Qty: 21 TABLET | Refills: 0 | Status: SHIPPED | OUTPATIENT
Start: 2022-12-16 | End: 2023-02-09

## 2022-12-16 NOTE — TELEPHONE ENCOUNTER
Antibiotics will not treat the flu.  I will call in Tamiflu but it was on backorder.  Ken[place]heath Thrasher called to pharmacy.  If she gets worse you need to take her to the emergency room.

## 2022-12-16 NOTE — TELEPHONE ENCOUNTER
Caller: Chapincito Browne    Relationship: Emergency Contact    Best call back number: [phone]    What medication are you requesting: STEROID     What are your current symptoms: SHORTNESS OF BREATHE     How long have you been experiencing symptoms: 12/10/22    Have you had these symptoms before:    [x] Yes  [] No    Have you been treated for these symptoms before:   [x] Yes  [] No    If a prescription is needed, what is your preferred pharmacy and phone number: [id number] [place], [place] - [address] [facility] - [phone] [facility] [phone]      Additional notes:PATIENTS SPOUSE CALLING STATING THAT SHE WAS DIAGNOSED   WITH FLU A AT [facility] HE WOULD LIKE TO KNOW IF SHE COULD COULD GET ANOTHER ROUND OF ANTIBIOTICS

## 2022-12-16 NOTE — TELEPHONE ENCOUNTER
Caller: Chapincito Browne    Relationship: Emergency Contact    Best call back number: [phone]     Requested Prescriptions:   Requested Prescriptions     Pending Prescriptions Disp Refills   • HYDROcodone-acetaminophen (NORCO) 5-325 MG per tablet 90 tablet 0     Sig: Take 1 tablet by mouth Every 8 (Eight) Hours As Needed for Moderate Pain. Chronic pain medicine for low back pain        Pharmacy where request should be sent: [facility] #[id number] - [place], [place] - [address] AT [facility] - [phone] [facility] [phone] [facility]     Additional details provided by patient:     Does the patient have less than a 3 day supply:  [] Yes  [x] No    Would you like a call back once the refill request has been completed: [] Yes [x] No    If the office needs to give you a call back, can they leave a voicemail: [] Yes [x] No    Harrison Banks Rep   12/16/22 09:27 EST

## 2022-12-27 RX ORDER — ZALEPLON 10 MG/1
10 CAPSULE ORAL NIGHTLY
Qty: 30 CAPSULE | Refills: 3 | Status: SHIPPED | OUTPATIENT
Start: 2022-12-27 | End: 2023-01-22 | Stop reason: SDUPTHER

## 2023-01-03 RX ORDER — IPRATROPIUM BROMIDE AND ALBUTEROL SULFATE 2.5; .5 MG/3ML; MG/3ML
3 SOLUTION RESPIRATORY (INHALATION)
Qty: 270 ML | Refills: 0 | Status: SHIPPED | OUTPATIENT
Start: 2023-01-03 | End: 2023-01-05 | Stop reason: SDUPTHER

## 2023-01-03 RX ORDER — BUDESONIDE 0.5 MG/2ML
0.5 INHALANT ORAL
Qty: 120 ML | Refills: 0 | Status: SHIPPED | OUTPATIENT
Start: 2023-01-03 | End: 2023-01-05 | Stop reason: SDUPTHER

## 2023-01-05 RX ORDER — BUDESONIDE 0.5 MG/2ML
0.5 INHALANT ORAL
Qty: 120 ML | Refills: 0 | Status: SHIPPED | OUTPATIENT
Start: 2023-01-05 | End: 2023-03-24 | Stop reason: SDUPTHER

## 2023-01-05 RX ORDER — IPRATROPIUM BROMIDE AND ALBUTEROL SULFATE 2.5; .5 MG/3ML; MG/3ML
3 SOLUTION RESPIRATORY (INHALATION)
Qty: 270 ML | Refills: 0 | Status: SHIPPED | OUTPATIENT
Start: 2023-01-05 | End: 2023-01-30

## 2023-01-09 RX ORDER — POTASSIUM CHLORIDE 750 MG/1
TABLET, FILM COATED, EXTENDED RELEASE ORAL
Qty: 90 TABLET | Refills: 1 | Status: SHIPPED | OUTPATIENT
Start: 2023-01-09 | End: 2023-03-24 | Stop reason: DRUGHIGH

## 2023-01-18 ENCOUNTER — TELEPHONE (OUTPATIENT)
Dept: ONCOLOGY | Facility: CLINIC | Age: 68
End: 2023-01-18

## 2023-01-18 NOTE — TELEPHONE ENCOUNTER
Caller: Chapincito Browne    Relationship to patient: Emergency Contact    Best call back number: [phone]    Chief complaint:PATIENT CALLING TO SCHEDULE CLOSER TO SUMMER    Type of visit: LAB AND FU    Requested date:CLOSER TO SUMMER

## 2023-01-19 DIAGNOSIS — Z79.891 LONG TERM CURRENT USE OF OPIATE ANALGESIC: ICD-10-CM

## 2023-01-19 RX ORDER — HYDROCODONE BITARTRATE AND ACETAMINOPHEN 5; 325 MG/1; MG/1
1 TABLET ORAL EVERY 8 HOURS PRN
Qty: 90 TABLET | Refills: 0 | Status: SHIPPED | OUTPATIENT
Start: 2023-01-19 | End: 2023-02-15 | Stop reason: SDUPTHER

## 2023-01-23 RX ORDER — ZALEPLON 10 MG/1
10 CAPSULE ORAL NIGHTLY
Qty: 30 CAPSULE | Refills: 3 | Status: SHIPPED | OUTPATIENT
Start: 2023-01-23 | End: 2023-03-24 | Stop reason: ALTCHOICE

## 2023-01-30 RX ORDER — IPRATROPIUM BROMIDE AND ALBUTEROL SULFATE 2.5; .5 MG/3ML; MG/3ML
SOLUTION RESPIRATORY (INHALATION)
Qty: 270 ML | Refills: 1 | Status: SHIPPED | OUTPATIENT
Start: 2023-01-30 | End: 2023-02-07 | Stop reason: SDUPTHER

## 2023-02-03 RX ORDER — ALBUTEROL SULFATE 2.5 MG/3ML
2.5 SOLUTION RESPIRATORY (INHALATION) EVERY 4 HOURS PRN
Qty: 120 EACH | Refills: 3 | Status: SHIPPED | OUTPATIENT
Start: 2023-02-03 | End: 2023-02-07

## 2023-02-07 RX ORDER — IPRATROPIUM BROMIDE AND ALBUTEROL SULFATE 2.5; .5 MG/3ML; MG/3ML
3 SOLUTION RESPIRATORY (INHALATION) 4 TIMES DAILY PRN
Qty: 120 ML | Refills: 1 | Status: SHIPPED | OUTPATIENT
Start: 2023-02-07 | End: 2023-02-14 | Stop reason: ALTCHOICE

## 2023-02-09 ENCOUNTER — HOSPITAL ENCOUNTER (OUTPATIENT)
Dept: GENERAL RADIOLOGY | Facility: HOSPITAL | Age: 68
Discharge: HOME | End: 2023-02-09
Payer: MEDICARE

## 2023-02-09 ENCOUNTER — TELEPHONE (OUTPATIENT)
Dept: FAMILY MEDICINE CLINIC | Facility: CLINIC | Age: 68
End: 2023-02-09
Payer: MEDICARE

## 2023-02-09 ENCOUNTER — OFFICE VISIT (OUTPATIENT)
Dept: FAMILY MEDICINE CLINIC | Facility: CLINIC | Age: 68
End: 2023-02-09
Payer: MEDICARE

## 2023-02-09 ENCOUNTER — LAB (OUTPATIENT)
Dept: LAB | Facility: HOSPITAL | Age: 68
End: 2023-02-09
Payer: MEDICARE

## 2023-02-09 VITALS
SYSTOLIC BLOOD PRESSURE: 130 MMHG | HEART RATE: 86 BPM | HEIGHT: 67 IN | BODY MASS INDEX: 20.88 KG/M2 | WEIGHT: 133 LBS | RESPIRATION RATE: 16 BRPM | DIASTOLIC BLOOD PRESSURE: 79 MMHG | TEMPERATURE: 98.2 F | OXYGEN SATURATION: 94 %

## 2023-02-09 DIAGNOSIS — R06.02 SHORTNESS OF BREATH AT REST: ICD-10-CM

## 2023-02-09 DIAGNOSIS — J44.1 COPD WITH ACUTE EXACERBATION: Primary | ICD-10-CM

## 2023-02-09 DIAGNOSIS — J44.1 COPD WITH ACUTE EXACERBATION: ICD-10-CM

## 2023-02-09 LAB
ALBUMIN SERPL-MCNC: 4.6 G/DL (ref 3.5–5.2)
ALBUMIN/GLOB SERPL: 2 G/DL
ALP SERPL-CCNC: 86 U/L (ref 39–117)
ALT SERPL W P-5'-P-CCNC: 16 U/L (ref 1–33)
ANION GAP SERPL CALCULATED.3IONS-SCNC: 9 MMOL/L (ref 5–15)
AST SERPL-CCNC: 21 U/L (ref 1–32)
BASOPHILS # BLD AUTO: 0.05 10*3/MM3 (ref 0–0.2)
BASOPHILS NFR BLD AUTO: 0.7 % (ref 0–1.5)
BILIRUB SERPL-MCNC: 0.7 MG/DL (ref 0–1.2)
BUN SERPL-MCNC: 9 MG/DL (ref 8–23)
BUN/CREAT SERPL: 11 (ref 7–25)
CALCIUM SPEC-SCNC: 9.2 MG/DL (ref 8.6–10.5)
CHLORIDE SERPL-SCNC: 95 MMOL/L (ref 98–107)
CO2 SERPL-SCNC: 41 MMOL/L (ref 22–29)
CREAT SERPL-MCNC: 0.82 MG/DL (ref 0.57–1)
DEPRECATED RDW RBC AUTO: 42.2 FL (ref 37–54)
EGFRCR SERPLBLD CKD-EPI 2021: 77.5 ML/MIN/1.73
EOSINOPHIL # BLD AUTO: 0.19 10*3/MM3 (ref 0–0.4)
EOSINOPHIL NFR BLD AUTO: 2.7 % (ref 0.3–6.2)
ERYTHROCYTE [DISTWIDTH] IN BLOOD BY AUTOMATED COUNT: 13.6 % (ref 12.3–15.4)
GLOBULIN UR ELPH-MCNC: 2.3 GM/DL
GLUCOSE SERPL-MCNC: 101 MG/DL (ref 65–99)
HCT VFR BLD AUTO: 37.2 % (ref 34–46.6)
HGB BLD-MCNC: 11.8 G/DL (ref 12–15.9)
IMM GRANULOCYTES # BLD AUTO: 0.01 10*3/MM3 (ref 0–0.05)
IMM GRANULOCYTES NFR BLD AUTO: 0.1 % (ref 0–0.5)
LYMPHOCYTES # BLD AUTO: 1.44 10*3/MM3 (ref 0.7–3.1)
LYMPHOCYTES NFR BLD AUTO: 20.2 % (ref 19.6–45.3)
MCH RBC QN AUTO: 27.3 PG (ref 26.6–33)
MCHC RBC AUTO-ENTMCNC: 31.7 G/DL (ref 31.5–35.7)
MCV RBC AUTO: 85.9 FL (ref 79–97)
MONOCYTES # BLD AUTO: 0.34 10*3/MM3 (ref 0.1–0.9)
MONOCYTES NFR BLD AUTO: 4.8 % (ref 5–12)
NEUTROPHILS NFR BLD AUTO: 5.11 10*3/MM3 (ref 1.7–7)
NEUTROPHILS NFR BLD AUTO: 71.5 % (ref 42.7–76)
NRBC BLD AUTO-RTO: 0 /100 WBC (ref 0–0.2)
PLATELET # BLD AUTO: 231 10*3/MM3 (ref 140–450)
PMV BLD AUTO: 10.2 FL (ref 6–12)
POTASSIUM SERPL-SCNC: 2.9 MMOL/L (ref 3.5–5.2)
PROT SERPL-MCNC: 6.9 G/DL (ref 6–8.5)
RBC # BLD AUTO: 4.33 10*6/MM3 (ref 3.77–5.28)
SODIUM SERPL-SCNC: 145 MMOL/L (ref 136–145)
WBC NRBC COR # BLD: 7.14 10*3/MM3 (ref 3.4–10.8)

## 2023-02-09 PROCEDURE — 71046 X-RAY EXAM CHEST 2 VIEWS: CPT

## 2023-02-09 PROCEDURE — 85025 COMPLETE CBC W/AUTO DIFF WBC: CPT | Performed by: NURSE PRACTITIONER

## 2023-02-09 PROCEDURE — 99214 OFFICE O/P EST MOD 30 MIN: CPT | Performed by: NURSE PRACTITIONER

## 2023-02-09 PROCEDURE — 36415 COLL VENOUS BLD VENIPUNCTURE: CPT | Performed by: NURSE PRACTITIONER

## 2023-02-09 PROCEDURE — 80053 COMPREHEN METABOLIC PANEL: CPT | Performed by: NURSE PRACTITIONER

## 2023-02-09 RX ORDER — DOXYCYCLINE HYCLATE 100 MG/1
100 CAPSULE ORAL 2 TIMES DAILY
Qty: 14 CAPSULE | Refills: 0 | Status: ON HOLD | OUTPATIENT
Start: 2023-02-09 | End: 2023-03-29

## 2023-02-09 RX ORDER — PREDNISONE 20 MG/1
20 TABLET ORAL DAILY
Qty: 5 TABLET | Refills: 0 | Status: SHIPPED | OUTPATIENT
Start: 2023-02-09 | End: 2023-03-24

## 2023-02-09 NOTE — TELEPHONE ENCOUNTER
Caller: Chapincito Browne    Relationship to patient: Emergency Contact    Best call back number: [phone]    Chief complaint: SHORTNESS OF BREATH    Patient directed to call 911 or go to their nearest emergency room.     Patient verbalized understanding: [] Yes  [x] No  If no, why?    Additional notes:HUB WARM TRANSFERRED

## 2023-02-09 NOTE — PATIENT INSTRUCTIONS
Start Doxycycline 100 mg BID for 7 days and Prednisone 20 mg daily x5 days. Get XR completed at [facility] and will notify of results once read by Radiology. If symptoms worsen or increased Shortness of Breath seek attention via ED.

## 2023-02-09 NOTE — PROGRESS NOTES
"Chief Complaint  Shortness of Breath    Subjective        Paz Browne presents to [facility]  History of Present Illness   67-year-old female with history of smoking quit 2008, COPD, A-fib, hyperlipidemia and hypertension, patient of Dr. Martinez, new to me, presenting with complaints of increased shortness of breath and wheezing, denies CP, fever, N/V/D.  With COPD, on continuous O2 2 L nasal cannula, taking albuterol DuoNeb, Pulmicort and Daliresp, followed by Dr. Melo with pulmonology, symptoms most likely related to COPD exacerbation, has history of pneumonia, will obtain chest x-ray, CBC and CMP to rule out infectious process.     Objective   Vital Signs:  /79 (BP Location: Left arm, Patient Position: Sitting, Cuff Size: Adult)   Pulse 86   Temp 98.2 °F (36.8 °C) (Infrared)   Resp 16   Ht 170.2 cm (67.01\")   Wt 60.3 kg (133 lb)   SpO2 94%   BMI 20.83 kg/m²   Estimated body mass index is 20.83 kg/m² as calculated from the following:    Height as of this encounter: 170.2 cm (67.01\").    Weight as of this encounter: 60.3 kg (133 lb).       BMI is within normal parameters. No other follow-up for BMI required.      Physical Exam  Cardiovascular:      Rate and Rhythm: Normal rate.      Pulses: Normal pulses.      Heart sounds: Normal heart sounds.   Pulmonary:      Effort: Accessory muscle usage present. No respiratory distress.      Breath sounds: No wheezing, rhonchi or rales.      Comments: Chronic diminished lung sound, oxygen dependent with 2L NC  Neurological:      General: No focal deficit present.      Mental Status: She is alert and oriented to person, place, and time.   Psychiatric:         Mood and Affect: Mood normal.         Behavior: Behavior normal.        Result Review :               Assessment and Plan   Diagnoses and all orders for this visit:    1. COPD with acute exacerbation (HCC) (Primary)  -     XR Chest PA & Lateral; Future  -     doxycycline " (VIBRAMYCIN) 100 MG capsule; Take 1 capsule by mouth 2 (Two) Times a Day.  Dispense: 14 capsule; Refill: 0  -     predniSONE (DELTASONE) 20 MG tablet; Take 1 tablet by mouth Daily.  Dispense: 5 tablet; Refill: 0  -     CBC & Differential  -     Comprehensive metabolic panel    2. Shortness of breath at rest  -     XR Chest PA & Lateral; Future  -     CBC & Differential  -     Comprehensive metabolic panel      Start Doxycycline 100 mg BID for 7 days and Prednisone 20 mg daily x5 days. Get [facility]R completed at [facility] and will notify of results once read by Radiology. If symptoms worsen or increased Shortness of Breath seek attention via ED.      I spent 30 minutes caring for Paz on this date of service. This time includes time spent by me in the following activities:reviewing tests, obtaining and/or reviewing a separately obtained history, performing a medically appropriate examination and/or evaluation , counseling and educating the patient/family/caregiver, ordering medications, tests, or procedures and documenting information in the medical record  Follow Up   Return if symptoms worsen or fail to improve.  Patient was given instructions and counseling regarding her condition or for health maintenance advice. Please see specific information pulled into the AVS if appropriate.     Mask and gloves worn

## 2023-02-10 ENCOUNTER — TELEPHONE (OUTPATIENT)
Dept: FAMILY MEDICINE CLINIC | Facility: CLINIC | Age: 68
End: 2023-02-10
Payer: MEDICARE

## 2023-02-10 DIAGNOSIS — E87.6 HYPOKALEMIA: Primary | ICD-10-CM

## 2023-02-10 DIAGNOSIS — Z79.899 ENCOUNTER FOR MONITORING DIURETIC THERAPY: ICD-10-CM

## 2023-02-10 DIAGNOSIS — Z51.81 ENCOUNTER FOR MONITORING DIURETIC THERAPY: ICD-10-CM

## 2023-02-10 RX ORDER — POTASSIUM CHLORIDE 20 MEQ/1
20 TABLET, EXTENDED RELEASE ORAL DAILY
Qty: 60 TABLET | Refills: 0 | Status: SHIPPED | OUTPATIENT
Start: 2023-02-10 | End: 2023-03-24 | Stop reason: SDUPTHER

## 2023-02-10 RX ORDER — POTASSIUM CHLORIDE 20 MEQ/1
40 TABLET, EXTENDED RELEASE ORAL ONCE
Qty: 2 TABLET | Refills: 0 | Status: SHIPPED | OUTPATIENT
Start: 2023-02-10 | End: 2023-03-24 | Stop reason: ALTCHOICE

## 2023-02-10 NOTE — TELEPHONE ENCOUNTER
.    Caller: Romana Browne    Relationship: Emergency Contact    Best call back number:[phone]    What test was performed: X-RAY AND LABS     When was the test performed: 2/10/23    Where was the test performed: AT THE HOSPITAL     Additional notes: ROMANA WOULD LIKE FOR SOMEONE TO GIVE HIM A CALL WITH THE RESULTS

## 2023-02-15 DIAGNOSIS — Z79.891 LONG TERM CURRENT USE OF OPIATE ANALGESIC: ICD-10-CM

## 2023-02-15 RX ORDER — HYDROCODONE BITARTRATE AND ACETAMINOPHEN 5; 325 MG/1; MG/1
1 TABLET ORAL EVERY 8 HOURS PRN
Qty: 90 TABLET | Refills: 0 | Status: SHIPPED | OUTPATIENT
Start: 2023-02-15 | End: 2023-02-17 | Stop reason: DRUGHIGH

## 2023-02-17 RX ORDER — IPRATROPIUM BROMIDE AND ALBUTEROL SULFATE 2.5; .5 MG/3ML; MG/3ML
3 SOLUTION RESPIRATORY (INHALATION) EVERY 4 HOURS PRN
Qty: 360 ML | Refills: 3 | Status: SHIPPED | OUTPATIENT
Start: 2023-02-17 | End: 2023-03-24 | Stop reason: SDUPTHER

## 2023-02-17 RX ORDER — HYDROCODONE BITARTRATE AND ACETAMINOPHEN 7.5; 325 MG/1; MG/1
1 TABLET ORAL EVERY 6 HOURS PRN
Qty: 90 TABLET | Refills: 0 | Status: SHIPPED | OUTPATIENT
Start: 2023-02-17 | End: 2023-03-16 | Stop reason: SDUPTHER

## 2023-02-20 RX ORDER — LANOLIN ALCOHOL/MO/W.PET/CERES
CREAM (GRAM) TOPICAL
Qty: 90 TABLET | Refills: 1 | Status: SHIPPED | OUTPATIENT
Start: 2023-02-20

## 2023-02-20 RX ORDER — ROPINIROLE 2 MG/1
TABLET, FILM COATED ORAL
Qty: 90 TABLET | Refills: 2 | Status: SHIPPED | OUTPATIENT
Start: 2023-02-20

## 2023-02-20 RX ORDER — CYCLOBENZAPRINE HCL 10 MG
TABLET ORAL
Qty: 90 TABLET | Refills: 3 | Status: SHIPPED | OUTPATIENT
Start: 2023-02-20

## 2023-02-22 ENCOUNTER — TELEPHONE (OUTPATIENT)
Dept: FAMILY MEDICINE CLINIC | Facility: CLINIC | Age: 68
End: 2023-02-22
Payer: MEDICARE

## 2023-02-22 NOTE — TELEPHONE ENCOUNTER
Doctor bo changed medicine twice keep getting a alternate to try changed last time to Ipratropiumsolalbuter. If not covered patient will have to see Pulmonary

## 2023-03-16 RX ORDER — HYDROCODONE BITARTRATE AND ACETAMINOPHEN 7.5; 325 MG/1; MG/1
1 TABLET ORAL EVERY 6 HOURS PRN
Qty: 90 TABLET | Refills: 0 | Status: SHIPPED | OUTPATIENT
Start: 2023-03-16 | End: 2023-03-20 | Stop reason: SDUPTHER

## 2023-03-16 NOTE — TELEPHONE ENCOUNTER
Caller: Paz Browne    Relationship: Self    Best call back number: [phone]    What test was performed: BLOODWORK    When was the test performed: 6/17    Where was the test performed: IN OFFICE    Additional notes: PLEASE CALL PATIENT TO REVIEW TEST RESULTS.        
Ferritin elevated otherwise normal.  May want to see a blood specialist?  
Pt informed, she will talk to  first and let you know if she wants to see specialist  
patient

## 2023-03-20 RX ORDER — HYDROCODONE BITARTRATE AND ACETAMINOPHEN 7.5; 325 MG/1; MG/1
1 TABLET ORAL EVERY 6 HOURS PRN
Qty: 90 TABLET | Refills: 0 | Status: SHIPPED | OUTPATIENT
Start: 2023-03-20

## 2023-03-24 ENCOUNTER — OFFICE VISIT (OUTPATIENT)
Dept: FAMILY MEDICINE CLINIC | Facility: CLINIC | Age: 68
End: 2023-03-24
Payer: MEDICARE

## 2023-03-24 VITALS
HEIGHT: 67 IN | WEIGHT: 132.6 LBS | HEART RATE: 75 BPM | DIASTOLIC BLOOD PRESSURE: 64 MMHG | SYSTOLIC BLOOD PRESSURE: 152 MMHG | BODY MASS INDEX: 20.81 KG/M2 | TEMPERATURE: 98.2 F | OXYGEN SATURATION: 93 %

## 2023-03-24 DIAGNOSIS — I10 PRIMARY HYPERTENSION: ICD-10-CM

## 2023-03-24 DIAGNOSIS — E78.00 PURE HYPERCHOLESTEROLEMIA: ICD-10-CM

## 2023-03-24 DIAGNOSIS — E87.6 HYPOKALEMIA: ICD-10-CM

## 2023-03-24 DIAGNOSIS — J44.1 COPD WITH ACUTE EXACERBATION: Primary | ICD-10-CM

## 2023-03-24 PROCEDURE — 99214 OFFICE O/P EST MOD 30 MIN: CPT | Performed by: INTERNAL MEDICINE

## 2023-03-24 PROCEDURE — 3078F DIAST BP <80 MM HG: CPT | Performed by: INTERNAL MEDICINE

## 2023-03-24 PROCEDURE — 1160F RVW MEDS BY RX/DR IN RCRD: CPT | Performed by: INTERNAL MEDICINE

## 2023-03-24 PROCEDURE — 3077F SYST BP >= 140 MM HG: CPT | Performed by: INTERNAL MEDICINE

## 2023-03-24 PROCEDURE — 1159F MED LIST DOCD IN RCRD: CPT | Performed by: INTERNAL MEDICINE

## 2023-03-24 PROCEDURE — 0124A COVID-19 (PFIZER) BIVALENT BOOSTER 12+YRS: CPT | Performed by: INTERNAL MEDICINE

## 2023-03-24 PROCEDURE — 91312 COVID-19 (PFIZER) BIVALENT BOOSTER 12+YRS: CPT | Performed by: INTERNAL MEDICINE

## 2023-03-24 RX ORDER — ROFLUMILAST 500 UG/1
500 TABLET ORAL DAILY
Qty: 90 TABLET | Refills: 1 | Status: SHIPPED | OUTPATIENT
Start: 2023-03-24

## 2023-03-24 RX ORDER — BUDESONIDE 0.5 MG/2ML
0.5 INHALANT ORAL
Qty: 120 ML | Refills: 3 | Status: SHIPPED | OUTPATIENT
Start: 2023-03-24 | End: 2023-04-23

## 2023-03-24 RX ORDER — ZOLPIDEM TARTRATE 10 MG/1
10 TABLET ORAL NIGHTLY PRN
Qty: 30 TABLET | Refills: 3 | Status: SHIPPED | OUTPATIENT
Start: 2023-03-24

## 2023-03-24 RX ORDER — ARFORMOTEROL TARTRATE 15 UG/2ML
SOLUTION RESPIRATORY (INHALATION)
COMMUNITY
Start: 2023-01-09 | End: 2023-03-24 | Stop reason: SDUPTHER

## 2023-03-24 RX ORDER — POTASSIUM CHLORIDE 20 MEQ/1
TABLET, EXTENDED RELEASE ORAL
Qty: 60 TABLET | Refills: 0 | Status: SHIPPED | OUTPATIENT
Start: 2023-03-24

## 2023-03-24 RX ORDER — IPRATROPIUM BROMIDE AND ALBUTEROL SULFATE 2.5; .5 MG/3ML; MG/3ML
3 SOLUTION RESPIRATORY (INHALATION) EVERY 4 HOURS PRN
Qty: 360 ML | Refills: 3 | Status: SHIPPED | OUTPATIENT
Start: 2023-03-24

## 2023-03-24 RX ORDER — ARFORMOTEROL TARTRATE 15 UG/2ML
15 SOLUTION RESPIRATORY (INHALATION)
Qty: 120 ML | Refills: 3 | Status: SHIPPED | OUTPATIENT
Start: 2023-03-24

## 2023-03-24 NOTE — PROGRESS NOTES
"Chief Complaint  Hypertension and Hyperlipidemia    Subjective        Paz Browne presents to [facility]  History of Present Illness patient was seen for COPD.  Patient has COPD requiring 24/7 O2.  Patient is getting a refill of her medication and is stable at the present time.  Patient is getting a COVID booster.  Blood pressures was 152/64.  Patient is taking amlodipine 10 mg daily, carvedilol 6.25 mg twice daily, lisinopril 40 mg daily.  Patient has not been checking her blood pressure at home.  Patient will monitor it for 1 week and call results.  Patient's cholesterols been treated with diet exercise and atorvastatin 40 mg daily.  Triglycerides 140, HDL 77, .  Patient is taking potassium 20 mg 3 times daily levels will be checked next week.      Dictated utilizing Dragon dictation. If there are questions or for further clarification, please contact me.    Objective   Vital Signs:  Blood Pressure 152/64 (BP Location: Left arm, Patient Position: Sitting, Cuff Size: Adult)   Pulse 75   Temperature 98.2 °F (36.8 °C)   Height 170.2 cm (67.01\")   Weight 60.1 kg (132 lb 9.6 oz)   Oxygen Saturation 93%   Body Mass Index 20.76 kg/m²   Estimated body mass index is 20.76 kg/m² as calculated from the following:    Height as of this encounter: 170.2 cm (67.01\").    Weight as of this encounter: 60.1 kg (132 lb 9.6 oz).       BMI is within normal parameters. No other follow-up for BMI required.      Physical Exam  Vitals and nursing note reviewed.   Constitutional:       Appearance: Normal appearance. She is well-developed.   HENT:      Head: Normocephalic and atraumatic.      Nose: Nose normal.      Mouth/Throat:      Mouth: Mucous membranes are moist.      Pharynx: Oropharynx is clear.   Eyes:      Extraocular Movements: Extraocular movements intact.      Conjunctiva/sclera: Conjunctivae normal.      Pupils: Pupils are equal, round, and reactive to light.   Cardiovascular:    "   Rate and Rhythm: Normal rate and regular rhythm.      Heart sounds: Normal heart sounds. No murmur heard.    No friction rub. No gallop.   Pulmonary:      Effort: Pulmonary effort is normal. No respiratory distress.      Breath sounds: Normal breath sounds. No stridor. No wheezing, rhonchi or rales.   Chest:      Chest wall: No tenderness.   Abdominal:      General: Bowel sounds are normal.      Palpations: Abdomen is soft.   Musculoskeletal:         General: Normal range of motion.      Cervical back: Normal range of motion and neck supple.   Skin:     General: Skin is warm and dry.   Neurological:      General: No focal deficit present.      Mental Status: She is alert and oriented to person, place, and time. Mental status is at baseline.   Psychiatric:         Mood and Affect: Mood normal.         Behavior: Behavior normal.         Thought Content: Thought content normal.         Judgment: Judgment normal.        Result Review :                   Assessment and Plan  #1 refill medication #2 Labs next week  Diagnoses and all orders for this visit:    1. COPD with acute exacerbation (HCC) (Primary)    2. Pure hypercholesterolemia    3. Primary hypertension    4. Hypokalemia  -     Basic Metabolic Panel; Future  -     Magnesium; Future    Other orders  -     potassium chloride (K-DUR,KLOR-CON) 20 MEQ CR tablet; 1 po tid  Dispense: 60 tablet; Refill: 0  -     roflumilast (DALIRESP) 500 MCG tablet tablet; Take 1 tablet by mouth Daily.  Dispense: 90 tablet; Refill: 1  -     arformoterol (BROVANA) 15 MCG/2ML nebulizer solution; Take 2 mL by nebulization 2 (Two) Times a Day.  Dispense: 120 mL; Refill: 3  -     ipratropium-albuterol (DUO-NEB) 0.5-2.5 mg/3 ml nebulizer; Take 3 mL by nebulization Every 4 (Four) Hours As Needed for Wheezing.  Dispense: 360 mL; Refill: 3  -     budesonide (PULMICORT) 0.5 MG/2ML nebulizer solution; Take 2 mL by nebulization 2 (Two) Times a Day for 30 days. Indications: Chronic Obstructive  Lung Disease, Chronic hypoxic respiratory failure  Dispense: 120 mL; Refill: 3  -     zolpidem (AMBIEN) 10 MG tablet; Take 1 tablet by mouth At Night As Needed for Sleep.  Dispense: 30 tablet; Refill: 3  -     COVID-19 Bivalent Booster (Pfizer) 12+yrs             Follow Up   Return in about 1 week (around 3/31/2023), or if symptoms worsen or fail to improve, for Recheck.  Patient was given instructions and counseling regarding her condition or for health maintenance advice. Please see specific information pulled into the AVS if appropriate.

## 2023-03-28 ENCOUNTER — TELEPHONE (OUTPATIENT)
Dept: FAMILY MEDICINE CLINIC | Facility: CLINIC | Age: 68
End: 2023-03-28
Payer: MEDICARE

## 2023-03-28 ENCOUNTER — APPOINTMENT (OUTPATIENT)
Dept: GENERAL RADIOLOGY | Facility: HOSPITAL | Age: 68
End: 2023-03-28
Payer: MEDICARE

## 2023-03-28 ENCOUNTER — HOSPITAL ENCOUNTER (OUTPATIENT)
Facility: HOSPITAL | Age: 68
Setting detail: OBSERVATION
Discharge: HOME | End: 2023-03-29
Attending: EMERGENCY MEDICINE | Admitting: INTERNAL MEDICINE
Payer: MEDICARE

## 2023-03-28 DIAGNOSIS — I48.0 PAF (PAROXYSMAL ATRIAL FIBRILLATION): ICD-10-CM

## 2023-03-28 DIAGNOSIS — J96.11 CHRONIC RESPIRATORY FAILURE WITH HYPOXIA: ICD-10-CM

## 2023-03-28 DIAGNOSIS — I27.20 PULMONARY HYPERTENSION: ICD-10-CM

## 2023-03-28 DIAGNOSIS — J44.1 COPD WITH ACUTE EXACERBATION: Primary | ICD-10-CM

## 2023-03-28 LAB
ALBUMIN SERPL-MCNC: 4.4 G/DL (ref 3.5–5.2)
ALBUMIN/GLOB SERPL: 1.6 G/DL
ALP SERPL-CCNC: 87 U/L (ref 39–117)
ALT SERPL W P-5'-P-CCNC: 17 U/L (ref 1–33)
ANION GAP SERPL CALCULATED.3IONS-SCNC: 12.5 MMOL/L (ref 5–15)
ARTERIAL PATENCY WRIST A: POSITIVE
AST SERPL-CCNC: 24 U/L (ref 1–32)
ATMOSPHERIC PRESS: 754.7 MMHG
B PARAPERT DNA SPEC QL NAA+PROBE: NOT DETECTED
B PERT DNA SPEC QL NAA+PROBE: NOT DETECTED
BASE EXCESS BLDA CALC-SCNC: 8.6 MMOL/L (ref 0–2)
BASOPHILS # BLD AUTO: 0.04 10*3/MM3 (ref 0–0.2)
BASOPHILS NFR BLD AUTO: 0.4 % (ref 0–1.5)
BDY SITE: ABNORMAL
BILIRUB SERPL-MCNC: 0.8 MG/DL (ref 0–1.2)
BUN SERPL-MCNC: 8 MG/DL (ref 8–23)
BUN/CREAT SERPL: 9 (ref 7–25)
C PNEUM DNA NPH QL NAA+NON-PROBE: NOT DETECTED
CALCIUM SPEC-SCNC: 10.1 MG/DL (ref 8.6–10.5)
CHLORIDE SERPL-SCNC: 95 MMOL/L (ref 98–107)
CO2 SERPL-SCNC: 32.5 MMOL/L (ref 22–29)
CREAT SERPL-MCNC: 0.89 MG/DL (ref 0.57–1)
D-LACTATE SERPL-SCNC: 1.2 MMOL/L (ref 0.5–2)
DEPRECATED RDW RBC AUTO: 38.5 FL (ref 37–54)
EGFRCR SERPLBLD CKD-EPI 2021: 70.3 ML/MIN/1.73
EOSINOPHIL # BLD AUTO: 0.38 10*3/MM3 (ref 0–0.4)
EOSINOPHIL NFR BLD AUTO: 4 % (ref 0.3–6.2)
ERYTHROCYTE [DISTWIDTH] IN BLOOD BY AUTOMATED COUNT: 12.4 % (ref 12.3–15.4)
FLUAV SUBTYP SPEC NAA+PROBE: NOT DETECTED
FLUBV RNA ISLT QL NAA+PROBE: NOT DETECTED
GAS FLOW AIRWAY: 2 LPM
GLOBULIN UR ELPH-MCNC: 2.7 GM/DL
GLUCOSE SERPL-MCNC: 126 MG/DL (ref 65–99)
HADV DNA SPEC NAA+PROBE: NOT DETECTED
HCO3 BLDA-SCNC: 34 MMOL/L (ref 22–28)
HCOV 229E RNA SPEC QL NAA+PROBE: NOT DETECTED
HCOV HKU1 RNA SPEC QL NAA+PROBE: NOT DETECTED
HCOV NL63 RNA SPEC QL NAA+PROBE: NOT DETECTED
HCOV OC43 RNA SPEC QL NAA+PROBE: NOT DETECTED
HCT VFR BLD AUTO: 34.7 % (ref 34–46.6)
HGB BLD-MCNC: 11.6 G/DL (ref 12–15.9)
HMPV RNA NPH QL NAA+NON-PROBE: NOT DETECTED
HOLD SPECIMEN: NORMAL
HOLD SPECIMEN: NORMAL
HPIV1 RNA ISLT QL NAA+PROBE: NOT DETECTED
HPIV2 RNA SPEC QL NAA+PROBE: NOT DETECTED
HPIV3 RNA NPH QL NAA+PROBE: NOT DETECTED
HPIV4 P GENE NPH QL NAA+PROBE: NOT DETECTED
IMM GRANULOCYTES # BLD AUTO: 0.03 10*3/MM3 (ref 0–0.05)
IMM GRANULOCYTES NFR BLD AUTO: 0.3 % (ref 0–0.5)
LYMPHOCYTES # BLD AUTO: 1.18 10*3/MM3 (ref 0.7–3.1)
LYMPHOCYTES NFR BLD AUTO: 12.5 % (ref 19.6–45.3)
M PNEUMO IGG SER IA-ACNC: NOT DETECTED
MCH RBC QN AUTO: 28.5 PG (ref 26.6–33)
MCHC RBC AUTO-ENTMCNC: 33.4 G/DL (ref 31.5–35.7)
MCV RBC AUTO: 85.3 FL (ref 79–97)
MODALITY: ABNORMAL
MONOCYTES # BLD AUTO: 0.39 10*3/MM3 (ref 0.1–0.9)
MONOCYTES NFR BLD AUTO: 4.1 % (ref 5–12)
NEUTROPHILS NFR BLD AUTO: 7.4 10*3/MM3 (ref 1.7–7)
NEUTROPHILS NFR BLD AUTO: 78.7 % (ref 42.7–76)
NRBC BLD AUTO-RTO: 0 /100 WBC (ref 0–0.2)
NT-PROBNP SERPL-MCNC: 1525 PG/ML (ref 0–900)
PCO2 BLDA: 49.3 MM HG (ref 35–45)
PH BLDA: 7.45 PH UNITS (ref 7.35–7.45)
PLATELET # BLD AUTO: 215 10*3/MM3 (ref 140–450)
PMV BLD AUTO: 10.8 FL (ref 6–12)
PO2 BLDA: 109.9 MM HG (ref 80–100)
POTASSIUM SERPL-SCNC: 3.5 MMOL/L (ref 3.5–5.2)
PROCALCITONIN SERPL-MCNC: 0.03 NG/ML (ref 0–0.25)
PROT SERPL-MCNC: 7.1 G/DL (ref 6–8.5)
RBC # BLD AUTO: 4.07 10*6/MM3 (ref 3.77–5.28)
RHINOVIRUS RNA SPEC NAA+PROBE: NOT DETECTED
RSV RNA NPH QL NAA+NON-PROBE: NOT DETECTED
SAO2 % BLDCOA: 98.4 % (ref 92–99)
SARS-COV-2 RNA NPH QL NAA+NON-PROBE: NOT DETECTED
SODIUM SERPL-SCNC: 140 MMOL/L (ref 136–145)
TOTAL RATE: 20 BREATHS/MINUTE
TROPONIN T SERPL HS-MCNC: 35 NG/L
WBC NRBC COR # BLD: 9.42 10*3/MM3 (ref 3.4–10.8)
WHOLE BLOOD HOLD COAG: NORMAL
WHOLE BLOOD HOLD SPECIMEN: NORMAL

## 2023-03-28 PROCEDURE — 84145 PROCALCITONIN (PCT): CPT | Performed by: EMERGENCY MEDICINE

## 2023-03-28 PROCEDURE — 96372 THER/PROPH/DIAG INJ SC/IM: CPT

## 2023-03-28 PROCEDURE — 0202U NFCT DS 22 TRGT SARS-COV-2: CPT | Performed by: EMERGENCY MEDICINE

## 2023-03-28 PROCEDURE — 85025 COMPLETE CBC W/AUTO DIFF WBC: CPT | Performed by: EMERGENCY MEDICINE

## 2023-03-28 PROCEDURE — 80053 COMPREHEN METABOLIC PANEL: CPT | Performed by: EMERGENCY MEDICINE

## 2023-03-28 PROCEDURE — 36600 WITHDRAWAL OF ARTERIAL BLOOD: CPT

## 2023-03-28 PROCEDURE — G0378 HOSPITAL OBSERVATION PER HR: HCPCS

## 2023-03-28 PROCEDURE — 94640 AIRWAY INHALATION TREATMENT: CPT

## 2023-03-28 PROCEDURE — 71046 X-RAY EXAM CHEST 2 VIEWS: CPT

## 2023-03-28 PROCEDURE — 83605 ASSAY OF LACTIC ACID: CPT | Performed by: EMERGENCY MEDICINE

## 2023-03-28 PROCEDURE — 25010000002 ENOXAPARIN PER 10 MG: Performed by: INTERNAL MEDICINE

## 2023-03-28 PROCEDURE — 94799 UNLISTED PULMONARY SVC/PX: CPT

## 2023-03-28 PROCEDURE — 36415 COLL VENOUS BLD VENIPUNCTURE: CPT

## 2023-03-28 PROCEDURE — 25010000002 METHYLPREDNISOLONE PER 125 MG: Performed by: EMERGENCY MEDICINE

## 2023-03-28 PROCEDURE — 99285 EMERGENCY DEPT VISIT HI MDM: CPT

## 2023-03-28 PROCEDURE — 93005 ELECTROCARDIOGRAM TRACING: CPT

## 2023-03-28 PROCEDURE — 84484 ASSAY OF TROPONIN QUANT: CPT | Performed by: EMERGENCY MEDICINE

## 2023-03-28 PROCEDURE — 94760 N-INVAS EAR/PLS OXIMETRY 1: CPT

## 2023-03-28 PROCEDURE — 82803 BLOOD GASES ANY COMBINATION: CPT

## 2023-03-28 PROCEDURE — 93005 ELECTROCARDIOGRAM TRACING: CPT | Performed by: EMERGENCY MEDICINE

## 2023-03-28 PROCEDURE — 94761 N-INVAS EAR/PLS OXIMETRY MLT: CPT

## 2023-03-28 PROCEDURE — 83880 ASSAY OF NATRIURETIC PEPTIDE: CPT | Performed by: EMERGENCY MEDICINE

## 2023-03-28 PROCEDURE — 96374 THER/PROPH/DIAG INJ IV PUSH: CPT

## 2023-03-28 PROCEDURE — 93010 ELECTROCARDIOGRAM REPORT: CPT | Performed by: INTERNAL MEDICINE

## 2023-03-28 RX ORDER — ALBUTEROL SULFATE 2.5 MG/3ML
2.5 SOLUTION RESPIRATORY (INHALATION)
Status: DISPENSED | OUTPATIENT
Start: 2023-03-28 | End: 2023-03-28

## 2023-03-28 RX ORDER — ONDANSETRON 4 MG/1
4 TABLET, FILM COATED ORAL EVERY 6 HOURS PRN
Status: DISCONTINUED | OUTPATIENT
Start: 2023-03-28 | End: 2023-03-29 | Stop reason: HOSPADM

## 2023-03-28 RX ORDER — NICOTINE POLACRILEX 4 MG
15 LOZENGE BUCCAL
Status: DISCONTINUED | OUTPATIENT
Start: 2023-03-28 | End: 2023-03-29 | Stop reason: HOSPADM

## 2023-03-28 RX ORDER — IPRATROPIUM BROMIDE AND ALBUTEROL SULFATE 2.5; .5 MG/3ML; MG/3ML
3 SOLUTION RESPIRATORY (INHALATION) ONCE
Status: COMPLETED | OUTPATIENT
Start: 2023-03-28 | End: 2023-03-28

## 2023-03-28 RX ORDER — UREA 10 %
3 LOTION (ML) TOPICAL NIGHTLY PRN
Status: DISCONTINUED | OUTPATIENT
Start: 2023-03-28 | End: 2023-03-29 | Stop reason: HOSPADM

## 2023-03-28 RX ORDER — ENOXAPARIN SODIUM 100 MG/ML
40 INJECTION SUBCUTANEOUS EVERY 24 HOURS
Status: DISCONTINUED | OUTPATIENT
Start: 2023-03-28 | End: 2023-03-29 | Stop reason: HOSPADM

## 2023-03-28 RX ORDER — ACETAMINOPHEN 325 MG/1
650 TABLET ORAL EVERY 4 HOURS PRN
Status: DISCONTINUED | OUTPATIENT
Start: 2023-03-28 | End: 2023-03-29 | Stop reason: HOSPADM

## 2023-03-28 RX ORDER — DEXTROSE MONOHYDRATE 25 G/50ML
25 INJECTION, SOLUTION INTRAVENOUS
Status: DISCONTINUED | OUTPATIENT
Start: 2023-03-28 | End: 2023-03-29 | Stop reason: HOSPADM

## 2023-03-28 RX ORDER — POTASSIUM CHLORIDE 750 MG/1
10 TABLET, FILM COATED, EXTENDED RELEASE ORAL DAILY
COMMUNITY
Start: 2023-03-26 | End: 2023-03-29 | Stop reason: HOSPADM

## 2023-03-28 RX ORDER — INSULIN LISPRO 100 [IU]/ML
0-9 INJECTION, SOLUTION INTRAVENOUS; SUBCUTANEOUS
Status: DISCONTINUED | OUTPATIENT
Start: 2023-03-29 | End: 2023-03-29 | Stop reason: HOSPADM

## 2023-03-28 RX ORDER — IBUPROFEN 600 MG/1
1 TABLET ORAL
Status: DISCONTINUED | OUTPATIENT
Start: 2023-03-28 | End: 2023-03-29 | Stop reason: HOSPADM

## 2023-03-28 RX ORDER — ONDANSETRON 2 MG/ML
4 INJECTION INTRAMUSCULAR; INTRAVENOUS EVERY 6 HOURS PRN
Status: DISCONTINUED | OUTPATIENT
Start: 2023-03-28 | End: 2023-03-29 | Stop reason: HOSPADM

## 2023-03-28 RX ORDER — METHYLPREDNISOLONE SODIUM SUCCINATE 125 MG/2ML
125 INJECTION, POWDER, LYOPHILIZED, FOR SOLUTION INTRAMUSCULAR; INTRAVENOUS ONCE
Status: COMPLETED | OUTPATIENT
Start: 2023-03-28 | End: 2023-03-28

## 2023-03-28 RX ORDER — SODIUM CHLORIDE 0.9 % (FLUSH) 0.9 %
10 SYRINGE (ML) INJECTION AS NEEDED
Status: DISCONTINUED | OUTPATIENT
Start: 2023-03-28 | End: 2023-03-29 | Stop reason: HOSPADM

## 2023-03-28 RX ADMIN — METHYLPREDNISOLONE SODIUM SUCCINATE 125 MG: 125 INJECTION, POWDER, FOR SOLUTION INTRAMUSCULAR; INTRAVENOUS at 18:59

## 2023-03-28 RX ADMIN — ALBUTEROL SULFATE 2.5 MG: 2.5 SOLUTION RESPIRATORY (INHALATION) at 19:21

## 2023-03-28 RX ADMIN — ENOXAPARIN SODIUM 40 MG: 100 INJECTION SUBCUTANEOUS at 19:26

## 2023-03-28 RX ADMIN — ACETAMINOPHEN 650 MG: 325 TABLET ORAL at 19:26

## 2023-03-28 RX ADMIN — IPRATROPIUM BROMIDE AND ALBUTEROL SULFATE 3 ML: 2.5; .5 SOLUTION RESPIRATORY (INHALATION) at 17:45

## 2023-03-28 NOTE — PROGRESS NOTES
"[facility] Clinical Pharmacy Services: Enoxaparin Consult    Paz Browne has a pharmacy consult to dose prophylactic enoxaparin per Dr. Bello's request.     Indication: VTE Prophylaxis  Home Anticoagulation: none     Relevant clinical data and objective history reviewed:  67 y.o. female 170.2 cm (67\") 59.9 kg (132 lb)   Body mass index is 20.67 kg/m².   Results from last 7 days   Lab Units 03/28/23  1658   PLATELETS 10*3/mm3 215     Estimated Creatinine Clearance: 56.4 mL/min (by C-G formula based on SCr of 0.89 mg/dL).    Assessment/Plan    Will start patient on 40mg subcutaneous every 24 hours, adjusted for renal function. Consult order will be discontinued but pharmacy will continue to follow.     Danuta Leon, [facility]  Clinical Pharmacist    "

## 2023-03-28 NOTE — ED TRIAGE NOTES
Patient to ER via car from home for SOA and R leg swelling    Patient has hx of COPD  Patient wears 2L nc at all times    Patient wearing mask this RN in PPE

## 2023-03-28 NOTE — TELEPHONE ENCOUNTER
PT WAS IN THE OTHER DAY TO SEE DR. LAINEZ, STATES SHE HAD HER COVID BOOSTER DURING THAT VISIT.    PT NOW C/O SOB AND HER HEART RATE WAS HIGH EARLIER BUT  SAID IT'S GONE DOWN SOME SINCE.    DOESN'T WANT TO GO TO THE ER

## 2023-03-28 NOTE — ED PROVIDER NOTES
EMERGENCY DEPARTMENT ENCOUNTER    Room Number:  29/29  Date seen:  3/28/2023  PCP: Javan Martinez MD  Historian: Patient and       HPI:  Chief Complaint: Shortness of breath    A complete HPI/ROS/PMH/PSH/SH/FH are unobtainable due to: N/A    Context: Paz Browne is a 67 y.o. female who presents to the ED c/o increasing shortness of breath over the past few days after she got her third COVID booster.  She states her cough is increased but it is nonproductive.  No fevers or chills.  Her chest feels tight at times.  She is also had a few bouts of PND.  She did notice that her lower extremities were swollen this morning but that has since resolved.  No abdominal pain, nausea, vomiting or diarrhea.    She is chronically on 2 L of oxygen.    Additional sources:  - Discussed/ obtained information from independent historians: Yes- confirms pertinent aspects of the HPI.    - External (non-ED) record review: Patient saw her primary care physician 4 days ago for COPD exacerbation.  She also has a history of high cholesterol, hypertension and hypokalemia.    - Chronic or social conditions impacting care: COPD, multiple other medical problems      PAST MEDICAL HISTORY  Active Ambulatory Problems     Diagnosis Date Noted   • PAF (paroxysmal atrial fibrillation) ([facility]) 01/25/2016   • Hypertension    • Hyperlipidemia    • Pain medication agreement 05/04/2016   • Hiatal hernia 05/04/2016   • Primary osteoarthritis involving multiple joints 05/04/2016   • Pulmonary hypertension ([facility])    • S/P TVR (tricuspid valve repair) 07/07/2016   • Pulmonary nodule 10/13/2016   • Restless leg syndrome 11/18/2016   • Chronic low back pain 08/02/2017   • Dysplastic polyp of colon 08/07/2017   • History of mitral valve replacement with tissue graft 08/11/2017   • Chronic hypoxemic respiratory failure ([facility]) 08/13/2017   • Anemia 08/09/2017   • Celiac artery stenosis ([facility]) 08/24/2017   • Intertrigo 08/25/2017   • Abnormal EKG  06/30/2019   • Muscle spasms of both lower extremities 12/22/2020   • Iron deficiency anemia 03/30/2021   • Adenomatous polyp of colon 03/30/2021   • Gastroesophageal reflux disease without esophagitis 03/30/2021   • Headache 07/04/2021   • History of endocarditis 02/03/2022   • Pneumonia of both lower lobes due to infectious organism 10/16/2022   • Peptic ulcer disease 10/17/2022   • Peripheral vascular disease ([facility]) 10/17/2022   • Hyperlipidemia 10/17/2022   • Hyperglycemia 10/17/2022   • COPD with acute exacerbation ([facility]) 10/19/2022     Resolved Ambulatory Problems     Diagnosis Date Noted   • Tachycardia    • Renal failure    • Palpitations    • Mitral regurgitation    • Heart failure ([facility]) 06/08/2016   • Long term current use of anticoagulant 10/13/2016   • Aspiration pneumonia ([facility]) 10/14/2016   • Hemoptysis 10/14/2016   • Lower GI bleed 08/09/2017   • Precordial pain 08/11/2017   • Oral candidiasis 08/14/2017   • VAN (acute kidney injury) ([facility]) 08/15/2017   • GI bleed 12/01/2017   • Acute exacerbation of chronic obstructive pulmonary disease (COPD) ([facility]) 01/02/2018   • Pneumonia due to infectious organism 07/18/2018   • Insomnia due to medical condition 02/01/2019   • COPD (chronic obstructive pulmonary disease) ([facility]) 06/30/2019   • Shingles 04/08/2020   • Vertigo 10/06/2020   • Dark stools 03/30/2021   • Acute hyperkalemia 05/10/2021   • Tremor 05/10/2021   • Anemia 05/10/2021   • COPD exacerbation ([facility]) 06/14/2021   • Septicemia ([facility]) 07/02/2021   • Bacteremia 07/03/2021   • Acute hypoxemic respiratory failure ([facility]) 10/17/2022   • COPD (chronic obstructive pulmonary disease) ([facility]) 10/17/2022   • Pulmonary nodule 10/17/2022   • Hypokalemia 10/17/2022   • Chronic respiratory failure ([facility]) 10/19/2022     Past Medical History:   Diagnosis Date   • Asthma    • Atrial flutter ([facility])    • Cataract    • Chronic respiratory failure with hypoxia ([facility])    • Colon polyp    • History of CHF (congestive heart failure)     • History of home oxygen therapy    • Infectious viral hepatitis    • Long term (current) use of anticoagulants    • Pneumonia          PAST SURGICAL HISTORY  Past Surgical History:   Procedure Laterality Date   • CARDIAC CATHETERIZATION  09/01/2014    Right dominant systemt, normal coronary arteries.    • CARDIAC CATHETERIZATION Left 6/10/2016    Procedure: Cardiac catheterization;  Surgeon: Sergei Hall MD;  Location: [facility] CATH INVASIVE LOCATION;  Service:    • CARDIAC CATHETERIZATION N/A 6/10/2016    Procedure: Right Heart Cath;  Surgeon: Sergei Hall MD;  Location: [facility] CATH INVASIVE LOCATION;  Service:    • CATARACT EXTRACTION     • COLONOSCOPY     • COLONOSCOPY N/A 8/4/2017    Procedure: COLONOSCOPY TO CECUM/TI WITH POLYPECTOMY ( COLD BX);  Surgeon: Clevelnad Devine MD;  Location: [facility] ENDOSCOPY;  Service:    • COLONOSCOPY N/A 8/10/2017    Procedure: COLONOSCOPY to cecum and TI with 2 clips placed at transverse;  Surgeon: Earnest PALOMO MD;  Location: [facility] ENDOSCOPY;  Service:    • COLONOSCOPY N/A 12/22/2017    Procedure: COLONOSCOPY INTO CECUM WITH COLD POLYPECTOMIES;  Surgeon: Cleveland Devine MD;  Location: [facility] ENDOSCOPY;  Service:    • COLONOSCOPY N/A 5/17/2021    Procedure: COLONOSCOPY to cecum;  Surgeon: Cleveland Devine MD;  Location: [facility] ENDOSCOPY;  Service: Gastroenterology;  Laterality: N/A;  Pre: Fe deficency anemia, h/x of polyps  Post: fair prep, normal   • ENDOSCOPY N/A 8/17/2017    Procedure: ESOPHAGOGASTRODUODENOSCOPY;  Surgeon: Porsha Ruby MD;  Location: [facility] ENDOSCOPY;  Service:    • ENDOSCOPY N/A 12/22/2017    Procedure: ESOPHAGOGASTRODUODENOSCOPY WITH BIOPSIES;  Surgeon: Cleveland Devine MD;  Location: [facility] ENDOSCOPY;  Service:    • ENDOSCOPY N/A 5/17/2021    Procedure: ESOPHAGOGASTRODUODENOSCOPY  with biopsies;  Surgeon: Cleveland Devine MD;  Location: [facility] ENDOSCOPY;  Service: Gastroenterology;  Laterality: N/A;  Pre: Fe deficency anemia, nausea, heme positive stool   Post:  gastritis, sloughing of distal esophagus mucosa   • GALLBLADDER SURGERY     • HEMORRHOIDECTOMY     • HYSTERECTOMY     • KIDNEY SURGERY  2013    Stent placement   • MAZE PROCEDURE     • MITRAL VALVE REPLACEMENT     • TONSILLECTOMY     • TRICUSPID VALVE REPLACEMENT           FAMILY HISTORY  Family History   Adopted: Yes   Problem Relation Age of Onset   • No Known Problems Mother    • No Known Problems Father          SOCIAL HISTORY  Social History     Socioeconomic History   • Marital status:    • Number of children: 2   Tobacco Use   • Smoking status: Former     Packs/day: 3.00     Years: 54.00     Pack years: 162.00     Types: Cigarettes     Quit date:      Years since quittin.2   • Smokeless tobacco: Never   • Tobacco comments:     caffeine - tea or mt dew    Vaping Use   • Vaping Use: Never used   Substance and Sexual Activity   • Alcohol use: No   • Drug use: No   • Sexual activity: Defer         ALLERGIES  Bupropion, Cephalexin, and Metoprolol        REVIEW OF SYSTEMS  Review of Systems   Constitutional: Negative for chills and fever.   Respiratory: Positive for cough, chest tightness and shortness of breath.    Cardiovascular: Positive for leg swelling.   Gastrointestinal: Negative for abdominal pain, diarrhea, nausea and vomiting.            PHYSICAL EXAM  ED Triage Vitals   Temp Heart Rate Resp BP SpO2   23 1644 23 1644 23 1648 23 1647 23 1644   98.5 °F (36.9 °C) 85 24 160/77 97 %      Temp src Heart Rate Source Patient Position BP Location FiO2 (%)   23 1644 23 1644 -- -- --   Tympanic Monitor          Physical Exam      Physical Exam   Constitutional: Pt. is oriented to person, place, and time.  She is well-developed, well-nourished, and in no distress.    HENT: Normocephalic and atraumatic. Pupils are equal, round, and reactive to light.   Neck: Normal range of motion. Neck supple. No JVD present. No tracheal deviation present.    Cardiovascular: Normal rate, regular rhythm and normal heart sounds.   Pulmonary/Chest: Has prolonged expiratory phase with diminished breath sounds and expiratory wheezing, mostly in the lower lobes.  No rales.    Abdominal: Soft.  No distension. There is no tenderness. There is no rebound and no guarding.   Musculoskeletal: No edema, tenderness or deformity.   Neurological: She is alert and oriented to person, place, and time.  She has no focal neurologic deficits.  Skin: Skin is warm and dry. Pt. is not diaphoretic.  Psychiatric: Mood, affect normal.  She is pleasant and cooperative.  Nursing note and vitals reviewed.            LAB RESULTS  Recent Results (from the past 24 hour(s))   ECG 12 Lead ED Triage Standing Order; SOA    Collection Time: 03/28/23  4:52 PM   Result Value Ref Range    QT Interval 444 ms   Comprehensive Metabolic Panel    Collection Time: 03/28/23  4:58 PM    Specimen: Blood   Result Value Ref Range    Glucose 126 (H) 65 - 99 mg/dL    BUN 8 8 - 23 mg/dL    Creatinine 0.89 0.57 - 1.00 mg/dL    Sodium 140 136 - 145 mmol/L    Potassium 3.5 3.5 - 5.2 mmol/L    Chloride 95 (L) 98 - 107 mmol/L    CO2 32.5 (H) 22.0 - 29.0 mmol/L    Calcium 10.1 8.6 - 10.5 mg/dL    Total Protein 7.1 6.0 - 8.5 g/dL    Albumin 4.4 3.5 - 5.2 g/dL    ALT (SGPT) 17 1 - 33 U/L    AST (SGOT) 24 1 - 32 U/L    Alkaline Phosphatase 87 39 - 117 U/L    Total Bilirubin 0.8 0.0 - 1.2 mg/dL    Globulin 2.7 gm/dL    A/G Ratio 1.6 g/dL    BUN/Creatinine Ratio 9.0 7.0 - 25.0    Anion Gap 12.5 5.0 - 15.0 mmol/L    eGFR 70.3 >60.0 mL/min/1.73   BNP    Collection Time: 03/28/23  4:58 PM    Specimen: Blood   Result Value Ref Range    proBNP 1,525.0 (H) 0.0 - 900.0 pg/mL   Single High Sensitivity Troponin T    Collection Time: 03/28/23  4:58 PM    Specimen: Blood   Result Value Ref Range    HS Troponin T 35 (H) <10 ng/L   Green Top (Gel)    Collection Time: 03/28/23  4:58 PM   Result Value Ref Range    Extra Tube Hold for add-ons.     Lavender Top    Collection Time: 03/28/23  4:58 PM   Result Value Ref Range    Extra Tube hold for add-on    Gold Top - SST    Collection Time: 03/28/23  4:58 PM   Result Value Ref Range    Extra Tube Hold for add-ons.    Light Blue Top    Collection Time: 03/28/23  4:58 PM   Result Value Ref Range    Extra Tube Hold for add-ons.    CBC Auto Differential    Collection Time: 03/28/23  4:58 PM    Specimen: Blood   Result Value Ref Range    WBC 9.42 3.40 - 10.80 10*3/mm3    RBC 4.07 3.77 - 5.28 10*6/mm3    Hemoglobin 11.6 (L) 12.0 - 15.9 g/dL    Hematocrit 34.7 34.0 - 46.6 %    MCV 85.3 79.0 - 97.0 fL    MCH 28.5 26.6 - 33.0 pg    MCHC 33.4 31.5 - 35.7 g/dL    RDW 12.4 12.3 - 15.4 %    RDW-SD 38.5 37.0 - 54.0 fl    MPV 10.8 6.0 - 12.0 fL    Platelets 215 140 - 450 10*3/mm3    Neutrophil % 78.7 (H) 42.7 - 76.0 %    Lymphocyte % 12.5 (L) 19.6 - 45.3 %    Monocyte % 4.1 (L) 5.0 - 12.0 %    Eosinophil % 4.0 0.3 - 6.2 %    Basophil % 0.4 0.0 - 1.5 %    Immature Grans % 0.3 0.0 - 0.5 %    Neutrophils, Absolute 7.40 (H) 1.70 - 7.00 10*3/mm3    Lymphocytes, Absolute 1.18 0.70 - 3.10 10*3/mm3    Monocytes, Absolute 0.39 0.10 - 0.90 10*3/mm3    Eosinophils, Absolute 0.38 0.00 - 0.40 10*3/mm3    Basophils, Absolute 0.04 0.00 - 0.20 10*3/mm3    Immature Grans, Absolute 0.03 0.00 - 0.05 10*3/mm3    nRBC 0.0 0.0 - 0.2 /100 WBC   Procalcitonin    Collection Time: 03/28/23  4:58 PM    Specimen: Blood   Result Value Ref Range    Procalcitonin 0.03 0.00 - 0.25 ng/mL   Blood Gas, Arterial -    Collection Time: 03/28/23  5:54 PM    Specimen: Arterial Blood   Result Value Ref Range    Site Arterial: right radial     Brian's Test Positive     pH, Arterial 7.447 7.350 - 7.450 pH units    pCO2, Arterial 49.3 (H) 35.0 - 45.0 mm Hg    pO2, Arterial 109.9 (H) 80.0 - 100.0 mm Hg    HCO3, Arterial 34.0 (H) 22.0 - 28.0 mmol/L    Base Excess, Arterial 8.6 (H) 0.0 - 2.0 mmol/L    O2 Saturation Calculated 98.4 92.0 - 99.0 %    Barometric Pressure  for Blood Gas 754.7 mmHg    Modality Cannula     Flow Rate 2 lpm    Rate 20 Breaths/minute   Lactic Acid, Plasma    Collection Time: 03/28/23  6:44 PM    Specimen: Arm, Right; Blood   Result Value Ref Range    Lactate 1.2 0.5 - 2.0 mmol/L   Respiratory Panel PCR w/COVID-19(SARS-CoV-2) KAJAL/MARILIN/DEBBIE/PAD/COR/MAD/DAVID In-House, NP Swab in UTM/VTM, 3-4 HR TAT - Swab, Nasopharynx    Collection Time: 03/28/23  6:58 PM    Specimen: Nasopharynx; Swab   Result Value Ref Range    ADENOVIRUS, PCR Not Detected Not Detected    Coronavirus 229E Not Detected Not Detected    Coronavirus HKU1 Not Detected Not Detected    Coronavirus NL63 Not Detected Not Detected    Coronavirus OC43 Not Detected Not Detected    COVID19 Not Detected Not Detected - Ref. Range    Human Metapneumovirus Not Detected Not Detected    Human Rhinovirus/Enterovirus Not Detected Not Detected    Influenza A PCR Not Detected Not Detected    Influenza B PCR Not Detected Not Detected    Parainfluenza Virus 1 Not Detected Not Detected    Parainfluenza Virus 2 Not Detected Not Detected    Parainfluenza Virus 3 Not Detected Not Detected    Parainfluenza Virus 4 Not Detected Not Detected    RSV, PCR Not Detected Not Detected    Bordetella pertussis pcr Not Detected Not Detected    Bordetella parapertussis PCR Not Detected Not Detected    Chlamydophila pneumoniae PCR Not Detected Not Detected    Mycoplasma pneumo by PCR Not Detected Not Detected       Ordered the above labs and reviewed the results.        RADIOLOGY  XR Chest 2 View    Result Date: 3/28/2023  CHEST: 2 VIEWS  HISTORY: Shortness of air. History of COPD. Emphysema.  COMPARISON: Two-view chest 02/09/2023, CT chest 10/17/2022.  FINDINGS:There is severe pulmonary emphysema. Calcified nodules are present within both upper lobes. There is pleural parenchymal scarring that appears greatest within the left lung base and superimposing the left cardiac border and this is without radiographic change. Cardiomediastinal  silhouette is not changed. Sternotomy wires and aortic vascular calcifications are present. There is a prosthetic mitral valve. The bones appear osteopenic.      Advanced pulmonary emphysema with areas of pleural parenchymal scarring without evidence for significant change. No convincing evidence for acute or superimposed process.  This report was finalized on 3/28/2023 5:29 PM by Dr. Magdi Greer M.D.        Ordered the above noted radiological studies. Reviewed by me in PACS.        PROCEDURES  Procedures      MEDICATIONS GIVEN IN ER  Medications   sodium chloride 0.9 % flush 10 mL (has no administration in time range)   albuterol (PROVENTIL) nebulizer solution 0.083% 2.5 mg/3mL (2.5 mg Nebulization Not Given 3/28/23 1927)   acetaminophen (TYLENOL) tablet 650 mg (650 mg Oral Given 3/28/23 1926)   ondansetron (ZOFRAN) tablet 4 mg (has no administration in time range)     Or   ondansetron (ZOFRAN) injection 4 mg (has no administration in time range)   melatonin tablet 3 mg (has no administration in time range)   Pharmacy to Dose enoxaparin (LOVENOX) (has no administration in time range)   Enoxaparin Sodium (LOVENOX) syringe 40 mg (40 mg Subcutaneous Given 3/28/23 1926)   ipratropium-albuterol (DUO-NEB) nebulizer solution 3 mL (3 mL Nebulization Given 3/28/23 1745)   methylPREDNISolone sodium succinate (SOLU-Medrol) injection 125 mg (125 mg Intravenous Given 3/28/23 1859)             MEDICAL DECISION MAKING, PROGRESS, and CONSULTS    All labs have been independently reviewed by me.  All radiology studies have been reviewed by me and discussed with radiologist dictating the report.   EKG's independently viewed and interpreted by me.  Discussion below represents my analysis of pertinent findings related to patient's condition, differential diagnosis, treatment plan and final disposition.      Orders placed during this visit:  Orders Placed This Encounter   Procedures   • Respiratory Panel PCR w/COVID-19(SARS-CoV-2)  KAJAL/MARILIN/DEBBIE/PAD/COR/MAD/DAVID In-House, NP Swab in UTM/VTM, 3-4 HR TAT - Swab, Nasopharynx   • XR Chest 2 View   • [place] Draw   • Comprehensive Metabolic Panel   • BNP   • Single High Sensitivity Troponin T   • CBC Auto Differential   • Blood Gas, Arterial -   • Lactic Acid, Plasma   • Procalcitonin   • Blood Gas, Arterial -   • Basic Metabolic Panel   • CBC (No Diff)   • Diet: Cardiac Diets; Healthy Heart (2-3 Na+); Texture: Regular Texture (IDDSI 7); Fluid Consistency: Thin (IDDSI 0)   • Undress & Gown   • Continuous Pulse Oximetry   • Vital Signs   • Vital Signs   • Cardiac Monitoring   • Up with assistance   • Daily Weights   • Strict Intake & Output   • Oral Care   • Code Status and Medical Interventions:   • Oxygen Therapy- Nasal Cannula; 2 LPM; Titrate for SPO2: equal to or greater than, 92%   • ECG 12 Lead ED Triage Standing Order; SOA   • Insert Peripheral IV   • Initiate Observation Status   • CBC & Differential   • Green Top (Gel)   • Lavender Top   • Gold Top - SST   • Light Blue Top       Additional orders considered but not ordered:  N/A    Differential diagnosis:  Differential diagnosis for chest pain/dyspnea includes but is not limited to:  Muscle strain, costochondritis, myositis, pleurisy, rib fracture, intercostal neuritis, herpes zoster, tumor, myocardial infarction, coronary syndrome, unstable angina, angina, aortic dissection, mitral valve prolapse, pericarditis, palpitations, pulmonary embolus, pneumonia, pneumothorax, lung cancer, GERD, esophagitis, esophageal spasm      Independent interpretation of labs, radiology studies, and discussions with consultants:  ED Course as of 03/28/23 2023   Tue Mar 28, 2023   1719 Hemoglobin(!): 11.6 [WC]   1730 Chest X-ray was independently visualized and preliminarily interpreted by me.  I see no effusions or infiltrates CXR was discussed with/officially interpreted by Dr. STOKES radiology there is advanced emphysema with areas of pleural-parenchymal scarring  without evidence of significant change.  No acute processes identified.  For official interpretation, see dictated report. [WC]   1740 proBNP(!): 1,525.0 [WC]   1742 HS Troponin T(!): 35 [WC]   1743 CMP is relatively unremarkable. [WC]   1757 pH, Arterial: 7.447 [WC]   1758 pCO2, Arterial(!): 49.3 [WC]   1758 pO2, Arterial(!): 109.9 [WC]   1758 Flow Rate: 2 [WC]   1758 O2 Saturation Calculated: 98.4 [WC]   1758 ABG looks good [WC]   1834 Case discussed with Dr. Bello ([facility])-she agrees to place patient observation status to a telemetry bed. [WC]   1923 Lactate: 1.2 [WC]   2018 Viral panel is unremarkable. [WC]      ED Course User Index  [WC] Yg Thayer MD              Appropriate PPE was worn by myself and the patient throughout our entire interaction.    DIAGNOSIS  Final diagnoses:   COPD with acute exacerbation (HCC)   Chronic respiratory failure with hypoxia (HCC)   Pulmonary hypertension (HCC)   PAF (paroxysmal atrial fibrillation) (HCC)         DISPOSITION  Observation-telemetry            Latest Documented Vital Signs:  As of 20:23 EDT  BP- 168/85 HR- 72 Temp- 98.5 °F (36.9 °C) (Tympanic) O2 sat- 100%        --    Please note that portions of this were completed with a voice recognition program.       Note Disclaimer: At [facility], we believe that sharing information builds trust and better relationships. You are receiving this note because you are receiving care at [facility] or recently visited. It is possible you will see health information before a provider has talked with you about it. This kind of information can be easy to misunderstand. To help you fully understand what it means for your health, we urge you to discuss this note with your provider.           Yg hTayer MD  03/28/23 1836       Yg Thayer MD  03/28/23 2023

## 2023-03-28 NOTE — ED NOTES
"Nursing report ED to floor  Paz Browne  67 y.o.  female    HPI :   Chief Complaint   Patient presents with    Shortness of Breath    Edema       Admitting doctor:   Susanne Bello MD    Admitting diagnosis:   The primary encounter diagnosis was COPD with acute exacerbation (HCC). Diagnoses of Chronic respiratory failure with hypoxia (HCC), Pulmonary hypertension (HCC), and PAF (paroxysmal atrial fibrillation) (HCC) were also pertinent to this visit.    Code status:   Current Code Status       Date Active Code Status Order ID Comments User Context       3/28/2023 1839 CPR (Attempt to Resuscitate) [id number]  Susanne Bello MD ED        Question Answer    Code Status (Patient has no pulse and is not breathing) CPR (Attempt to Resuscitate)    Medical Interventions (Patient has pulse or is breathing) Full                    Allergies:   Bupropion, Cephalexin, and Metoprolol    Isolation:   No active isolations    Intake and Output  No intake or output data in the 24 hours ending 03/28/23 1901    Weight:       03/28/23  1647   Weight: 59.9 kg (132 lb)       Most recent vitals:   Vitals:    03/28/23 1647 03/28/23 1648 03/28/23 1745 03/28/23 1848   BP: 160/77   168/85   BP Location:    Right arm   Patient Position:    Lying   Pulse:   75 72   Resp:  24 20 20   Temp:       TempSrc:       SpO2:   95% 100%   Weight: 59.9 kg (132 lb)      Height: 170.2 cm (67\")          Active LDAs/IV Access:   Lines, Drains & Airways       Active LDAs       Name Placement date Placement time Site Days    Peripheral IV 03/28/23 1858 Left Antecubital 03/28/23 1858  Antecubital  less than 1                    Labs (abnormal labs have a star):   Labs Reviewed   COMPREHENSIVE METABOLIC PANEL - Abnormal; Notable for the following components:       Result Value    Glucose 126 (*)     Chloride 95 (*)     CO2 32.5 (*)     All other components within normal limits    Narrative:     GFR Normal >60  Chronic Kidney Disease <60  Kidney " Failure <15     BNP (IN-HOUSE) - Abnormal; Notable for the following components:    proBNP 1,525.0 (*)     All other components within normal limits    Narrative:     Among patients with dyspnea, NT-proBNP is highly sensitive for the detection of acute congestive heart failure. In addition NT-proBNP of <300 pg/ml effectively rules out acute congestive heart failure with 99% negative predictive value.    Results may be falsely decreased if patient taking Biotin.     SINGLE HSTROPONIN T - Abnormal; Notable for the following components:    HS Troponin T 35 (*)     All other components within normal limits    Narrative:     High Sensitive Troponin T Reference Range:  <10.0 ng/L- Negative Female for AMI  <15.0 ng/L- Negative Male for AMI  >=10 - Abnormal Female indicating possible myocardial injury.  >=15 - Abnormal Male indicating possible myocardial injury.   Clinicians would have to utilize clinical acumen, EKG, Troponin, and serial changes to determine if it is an Acute Myocardial Infarction or myocardial injury due to an underlying chronic condition.        CBC WITH AUTO DIFFERENTIAL - Abnormal; Notable for the following components:    Hemoglobin 11.6 (*)     Neutrophil % 78.7 (*)     Lymphocyte % 12.5 (*)     Monocyte % 4.1 (*)     Neutrophils, Absolute 7.40 (*)     All other components within normal limits   BLOOD GAS, ARTERIAL - Abnormal; Notable for the following components:    pCO2, Arterial 49.3 (*)     pO2, Arterial 109.9 (*)     HCO3, Arterial 34.0 (*)     Base Excess, Arterial 8.6 (*)     All other components within normal limits   PROCALCITONIN - Normal    Narrative:     As a Marker for Sepsis (Non-Neonates):    1. <0.5 ng/mL represents a low risk of severe sepsis and/or septic shock.  2. >2 ng/mL represents a high risk of severe sepsis and/or septic shock.    As a Marker for Lower Respiratory Tract Infections that require antibiotic therapy:    PCT on Admission    Antibiotic Therapy       6-12 Hrs  "later    >0.5                Strongly Recommended  >0.25 - <0.5        Recommended   0.1 - 0.25          Discouraged              Remeasure/reassess PCT  <0.1                Strongly Discouraged     Remeasure/reassess PCT    As 28 day mortality risk marker: \"Change in Procalcitonin Result\" (>80% or <=80%) if Day 0 (or Day 1) and Day 4 values are available. Refer to http://[email or web address]-pct-calculator.com    Change in PCT <=80%  A decrease of PCT levels below or equal to 80% defines a positive change in PCT test result representing a higher risk for 28-day all-cause mortality of patients diagnosed with severe sepsis for septic shock.    Change in PCT >80%  A decrease of PCT levels of more than 80% defines a negative change in PCT result representing a lower risk for 28-day all-cause mortality of patients diagnosed with severe sepsis or septic shock.      RESPIRATORY PANEL PCR W/ COVID-19 (SARS-COV-2) KAJAL/MARILIN/DEBBIE/PAD/COR/MAD/DAVID IN-HOUSE, NP SWAB IN [facility], 3-4 HR TAT   RAINBOW DRAW    Narrative:     The following orders were created for panel order [place] Draw.  Procedure                               Abnormality         Status                     ---------                               -----------         ------                     Green Top (Gel)[[id number]]                                  Final result               Lavender Top[[id number]]                                     Final result               Gold Top - SST[[id number]]                                   Final result               Light Blue Top[[id number]]                                   Final result                 Please view results for these tests on the individual orders.   BLOOD GAS, ARTERIAL   LACTIC ACID, PLASMA   CBC AND DIFFERENTIAL    Narrative:     The following orders were created for panel order CBC & Differential.  Procedure                               Abnormality         Status                     ---------                               " -----------         ------                     CBC Auto Differential[[id number]]        Abnormal            Final result                 Please view results for these tests on the individual orders.   GREEN TOP   LAVENDER TOP   GOLD TOP - SST   LIGHT BLUE TOP       EKG:   ECG 12 Lead ED Triage Standing Order; SOA   Preliminary Result   HEART RATE= 84  bpm   RR Interval= 714  ms   AL Interval= 49  ms   P Horizontal Axis=   deg   P Front Axis= 0  deg   QRSD Interval= 116  ms   QT Interval= 444  ms   QRS Axis= 83  deg   T Wave Axis= 120  deg   - ABNORMAL ECG -   Sinus rhythm   Multiple premature complexes, vent & supraven   Short AL interval   Nonspecific intraventricular conduction delay   Repol abnrm suggests ischemia, anterolateral   Electronically Signed By:    Date and Time of Study: 2023-03-28 16:52:47          Meds given in ED:   Medications   sodium chloride 0.9 % flush 10 mL (has no administration in time range)   albuterol (PROVENTIL) nebulizer solution 0.083% 2.5 mg/3mL (has no administration in time range)   acetaminophen (TYLENOL) tablet 650 mg (has no administration in time range)   ondansetron (ZOFRAN) tablet 4 mg (has no administration in time range)     Or   ondansetron (ZOFRAN) injection 4 mg (has no administration in time range)   melatonin tablet 3 mg (has no administration in time range)   Pharmacy to Dose enoxaparin (LOVENOX) (has no administration in time range)   Enoxaparin Sodium (LOVENOX) syringe 40 mg (has no administration in time range)   ipratropium-albuterol (DUO-NEB) nebulizer solution 3 mL (3 mL Nebulization Given 3/28/23 9702)   methylPREDNISolone sodium succinate (SOLU-Medrol) injection 125 mg (125 mg Intravenous Given 3/28/23 2939)       Imaging results:  XR Chest 2 View    Result Date: 3/28/2023  Advanced pulmonary emphysema with areas of pleural parenchymal scarring without evidence for significant change. No convincing evidence for acute or superimposed process.  This report was  finalized on 3/28/2023 5:29 PM by Dr. Magdi Greer M.D.       Ambulatory status:   - Up x's 1 assist    Social issues:   Social History     Socioeconomic History    Marital status:     Number of children: 2   Tobacco Use    Smoking status: Former     Packs/day: 3.00     Years: 54.00     Pack years: 162.00     Types: Cigarettes     Quit date:      Years since quittin.2    Smokeless tobacco: Never    Tobacco comments:     caffeine - tea or mt dew    Vaping Use    Vaping Use: Never used   Substance and Sexual Activity    Alcohol use: No    Drug use: No    Sexual activity: Defer       NIH Stroke Scale:         Ankita Richardson RN  23 19:01 EDT

## 2023-03-29 ENCOUNTER — READMISSION MANAGEMENT (OUTPATIENT)
Dept: CALL CENTER | Facility: HOSPITAL | Age: 68
End: 2023-03-29
Payer: MEDICARE

## 2023-03-29 VITALS
BODY MASS INDEX: 20.48 KG/M2 | SYSTOLIC BLOOD PRESSURE: 157 MMHG | RESPIRATION RATE: 18 BRPM | HEART RATE: 77 BPM | WEIGHT: 130.5 LBS | TEMPERATURE: 98.3 F | DIASTOLIC BLOOD PRESSURE: 68 MMHG | HEIGHT: 67 IN | OXYGEN SATURATION: 96 %

## 2023-03-29 PROBLEM — I50.32 CHRONIC DIASTOLIC CHF (CONGESTIVE HEART FAILURE): Status: ACTIVE | Noted: 2023-03-29

## 2023-03-29 LAB
ANION GAP SERPL CALCULATED.3IONS-SCNC: 8.5 MMOL/L (ref 5–15)
BUN SERPL-MCNC: 10 MG/DL (ref 8–23)
BUN/CREAT SERPL: 13.5 (ref 7–25)
CALCIUM SPEC-SCNC: 10 MG/DL (ref 8.6–10.5)
CHLORIDE SERPL-SCNC: 99 MMOL/L (ref 98–107)
CO2 SERPL-SCNC: 31.5 MMOL/L (ref 22–29)
CREAT SERPL-MCNC: 0.74 MG/DL (ref 0.57–1)
DEPRECATED RDW RBC AUTO: 38.2 FL (ref 37–54)
EGFRCR SERPLBLD CKD-EPI 2021: 87.7 ML/MIN/1.73
ERYTHROCYTE [DISTWIDTH] IN BLOOD BY AUTOMATED COUNT: 12.5 % (ref 12.3–15.4)
GLUCOSE BLDC GLUCOMTR-MCNC: 103 MG/DL (ref 70–130)
GLUCOSE BLDC GLUCOMTR-MCNC: 148 MG/DL (ref 70–130)
GLUCOSE SERPL-MCNC: 149 MG/DL (ref 65–99)
HBA1C MFR BLD: 5.1 % (ref 4.8–5.6)
HCT VFR BLD AUTO: 32.9 % (ref 34–46.6)
HGB BLD-MCNC: 10.7 G/DL (ref 12–15.9)
MCH RBC QN AUTO: 27.9 PG (ref 26.6–33)
MCHC RBC AUTO-ENTMCNC: 32.5 G/DL (ref 31.5–35.7)
MCV RBC AUTO: 85.7 FL (ref 79–97)
PLATELET # BLD AUTO: 194 10*3/MM3 (ref 140–450)
PMV BLD AUTO: 11.2 FL (ref 6–12)
POTASSIUM SERPL-SCNC: 3.2 MMOL/L (ref 3.5–5.2)
QT INTERVAL: 444 MS
RBC # BLD AUTO: 3.84 10*6/MM3 (ref 3.77–5.28)
SODIUM SERPL-SCNC: 139 MMOL/L (ref 136–145)
WBC NRBC COR # BLD: 5.96 10*3/MM3 (ref 3.4–10.8)

## 2023-03-29 PROCEDURE — 94799 UNLISTED PULMONARY SVC/PX: CPT

## 2023-03-29 PROCEDURE — 94664 DEMO&/EVAL PT USE INHALER: CPT

## 2023-03-29 PROCEDURE — 85027 COMPLETE CBC AUTOMATED: CPT | Performed by: INTERNAL MEDICINE

## 2023-03-29 PROCEDURE — 83036 HEMOGLOBIN GLYCOSYLATED A1C: CPT | Performed by: INTERNAL MEDICINE

## 2023-03-29 PROCEDURE — 63710000001 PREDNISONE PER 1 MG: Performed by: INTERNAL MEDICINE

## 2023-03-29 PROCEDURE — 80048 BASIC METABOLIC PNL TOTAL CA: CPT | Performed by: INTERNAL MEDICINE

## 2023-03-29 PROCEDURE — 94761 N-INVAS EAR/PLS OXIMETRY MLT: CPT

## 2023-03-29 PROCEDURE — 82962 GLUCOSE BLOOD TEST: CPT

## 2023-03-29 PROCEDURE — G0378 HOSPITAL OBSERVATION PER HR: HCPCS

## 2023-03-29 RX ORDER — ROPINIROLE 2 MG/1
2 TABLET, FILM COATED ORAL NIGHTLY
Status: DISCONTINUED | OUTPATIENT
Start: 2023-03-29 | End: 2023-03-29 | Stop reason: HOSPADM

## 2023-03-29 RX ORDER — CYCLOBENZAPRINE HCL 10 MG
10 TABLET ORAL NIGHTLY
Status: DISCONTINUED | OUTPATIENT
Start: 2023-03-29 | End: 2023-03-29 | Stop reason: HOSPADM

## 2023-03-29 RX ORDER — BENZONATATE 100 MG/1
200 CAPSULE ORAL 3 TIMES DAILY PRN
Status: DISCONTINUED | OUTPATIENT
Start: 2023-03-29 | End: 2023-03-29 | Stop reason: HOSPADM

## 2023-03-29 RX ORDER — FUROSEMIDE 40 MG/1
40 TABLET ORAL 2 TIMES DAILY
Status: DISCONTINUED | OUTPATIENT
Start: 2023-03-29 | End: 2023-03-29 | Stop reason: HOSPADM

## 2023-03-29 RX ORDER — AMLODIPINE BESYLATE 5 MG/1
10 TABLET ORAL DAILY
Status: DISCONTINUED | OUTPATIENT
Start: 2023-03-29 | End: 2023-03-29 | Stop reason: HOSPADM

## 2023-03-29 RX ORDER — ARFORMOTEROL TARTRATE 15 UG/2ML
15 SOLUTION RESPIRATORY (INHALATION)
Status: DISCONTINUED | OUTPATIENT
Start: 2023-03-29 | End: 2023-03-29 | Stop reason: HOSPADM

## 2023-03-29 RX ORDER — IPRATROPIUM BROMIDE AND ALBUTEROL SULFATE 2.5; .5 MG/3ML; MG/3ML
3 SOLUTION RESPIRATORY (INHALATION)
Status: DISCONTINUED | OUTPATIENT
Start: 2023-03-29 | End: 2023-03-29 | Stop reason: HOSPADM

## 2023-03-29 RX ORDER — HYDROCODONE BITARTRATE AND ACETAMINOPHEN 7.5; 325 MG/1; MG/1
1 TABLET ORAL EVERY 6 HOURS PRN
Status: DISCONTINUED | OUTPATIENT
Start: 2023-03-29 | End: 2023-03-29 | Stop reason: HOSPADM

## 2023-03-29 RX ORDER — PREDNISONE 20 MG/1
TABLET ORAL
Qty: 9 TABLET | Refills: 0 | Status: SHIPPED | OUTPATIENT
Start: 2023-03-29 | End: 2023-04-04

## 2023-03-29 RX ORDER — PREDNISONE 20 MG/1
40 TABLET ORAL
Status: DISCONTINUED | OUTPATIENT
Start: 2023-03-29 | End: 2023-03-29 | Stop reason: HOSPADM

## 2023-03-29 RX ORDER — CARVEDILOL 6.25 MG/1
6.25 TABLET ORAL 2 TIMES DAILY WITH MEALS
Status: DISCONTINUED | OUTPATIENT
Start: 2023-03-29 | End: 2023-03-29 | Stop reason: HOSPADM

## 2023-03-29 RX ORDER — ATORVASTATIN CALCIUM 20 MG/1
40 TABLET, FILM COATED ORAL DAILY
Status: DISCONTINUED | OUTPATIENT
Start: 2023-03-29 | End: 2023-03-29 | Stop reason: HOSPADM

## 2023-03-29 RX ORDER — POTASSIUM CHLORIDE 750 MG/1
40 TABLET, FILM COATED, EXTENDED RELEASE ORAL DAILY
Status: DISCONTINUED | OUTPATIENT
Start: 2023-03-29 | End: 2023-03-29 | Stop reason: HOSPADM

## 2023-03-29 RX ORDER — ASPIRIN 81 MG/1
81 TABLET ORAL DAILY
Status: DISCONTINUED | OUTPATIENT
Start: 2023-03-29 | End: 2023-03-29 | Stop reason: HOSPADM

## 2023-03-29 RX ADMIN — Medication 10 ML: at 08:17

## 2023-03-29 RX ADMIN — FUROSEMIDE 40 MG: 40 TABLET ORAL at 08:17

## 2023-03-29 RX ADMIN — HYDROCODONE BITARTRATE AND ACETAMINOPHEN 1 TABLET: 7.5; 325 TABLET ORAL at 00:31

## 2023-03-29 RX ADMIN — ASPIRIN 81 MG: 81 TABLET, COATED ORAL at 08:17

## 2023-03-29 RX ADMIN — ATORVASTATIN CALCIUM 40 MG: 20 TABLET, FILM COATED ORAL at 08:17

## 2023-03-29 RX ADMIN — CYCLOBENZAPRINE 10 MG: 10 TABLET, FILM COATED ORAL at 00:31

## 2023-03-29 RX ADMIN — AMLODIPINE BESYLATE 10 MG: 5 TABLET ORAL at 08:17

## 2023-03-29 RX ADMIN — CARVEDILOL 6.25 MG: 6.25 TABLET, FILM COATED ORAL at 08:17

## 2023-03-29 RX ADMIN — HYDROCODONE BITARTRATE AND ACETAMINOPHEN 1 TABLET: 7.5; 325 TABLET ORAL at 06:30

## 2023-03-29 RX ADMIN — IPRATROPIUM BROMIDE AND ALBUTEROL SULFATE 3 ML: 2.5; .5 SOLUTION RESPIRATORY (INHALATION) at 10:49

## 2023-03-29 RX ADMIN — PREDNISONE 40 MG: 20 TABLET ORAL at 12:06

## 2023-03-29 RX ADMIN — ARFORMOTEROL TARTRATE 15 MCG: 15 SOLUTION RESPIRATORY (INHALATION) at 06:49

## 2023-03-29 RX ADMIN — POTASSIUM CHLORIDE 40 MEQ: 750 TABLET, EXTENDED RELEASE ORAL at 12:06

## 2023-03-29 NOTE — DISCHARGE SUMMARY
NAME: Paz Browne ADMIT: 3/28/2023   : 1955  PCP: Javan Martinez MD    MRN: [id number] LOS: 0 days   AGE/SEX: 67 y.o. female  ROOM: S618/1     Date of Admission:  3/28/2023  Date of Discharge:  3/29/2023    PCP: Javan Martinez MD    CHIEF COMPLAINT  Shortness of Breath and Edema      DISCHARGE DIAGNOSIS  Active Hospital Problems    Diagnosis  POA   • **COPD with acute exacerbation ([facility]) [J44.1]  Yes   • Chronic diastolic CHF (congestive heart failure) ([facility]) [I50.32]  Yes   • Chronic hypoxemic respiratory failure ([facility]) [J96.11]  Yes   • Hypertension [I10]  Yes   • PAF (paroxysmal atrial fibrillation) ([facility]) [I48.0]  Yes      Resolved Hospital Problems   No resolved problems to display.       SECONDARY DIAGNOSES  Past Medical History:   Diagnosis Date   • VAN (acute kidney injury) ([facility])    • Anemia    • Asthma    • Atrial flutter ([facility])     cardioversion   • Cataract    • Celiac artery stenosis ([facility])    • Chronic respiratory failure with hypoxia ([facility])    • Colon polyp    • COPD (chronic obstructive pulmonary disease) ([facility])    • GI bleed    • Hiatal hernia    • History of CHF (congestive heart failure)     due to MR   • History of home oxygen therapy     3 lpm NC   • History of mitral valve replacement with tissue graft    • Hyperlipidemia    • Hypertension    • Infectious viral hepatitis     B   • Intertrigo    • Long term (current) use of anticoagulants    • Mitral regurgitation     s/p tissue MVR   • PAF (paroxysmal atrial fibrillation) ([facility])     s/p MAZE   • Pneumonia    • Pulmonary hypertension ([facility])    • S/P TVR (tricuspid valve repair) 2016       HOSPITAL COURSE  67-year-old with history of COPD, chronic respiratory failure, CHF, previous GI bleed, a flutter and A-fib, previous tissue mitral valve replacement who presents with a few days of shortness of breath.  She had a COVID booster a few days ago.  She came to [facility] emergency room and was given steroids and  bronchodilators. No evidence for PNA/Bacterial infection. She feels better and wishes for discharge today on steroid taper and follow up with her outpatient providers.       DIAGNOSTICS    29/2023 0721 03/29/2023 0915 Basic Metabolic Panel [[id number]]    (Abnormal)   Blood    Final result Component Value Units   Glucose 149 High  mg/dL   BUN 10 mg/dL   Creatinine 0.74 mg/dL   Sodium 139 mmol/L   Potassium 3.2 Low  mmol/L   Chloride 99 mmol/L   CO2 31.5 High  mmol/L   Calcium 10.0 mg/dL   BUN/Creatinine Ratio 13.5    Anion Gap 8.5 mmol/L   eGFR 87.7 mL/min/1.73          03/29/2023 0721 03/29/2023 0855 CBC (No Diff) [[id number]]   (Abnormal)   Blood    Final result Component Value Units   WBC 5.96 10*3/mm3   RBC 3.84 10*6/mm3   Hemoglobin 10.7 Low  g/dL   Hematocrit 32.9 Low  %   MCV 85.7 fL   MCH 27.9 pg   MCHC 32.5 g/dL   RDW 12.5 %   RDW-SD 38.2 fl   MPV 11.2 fL   Platelets 194 10*3/mm3          03/29/2023 0721 03/29/2023 0909 Hemoglobin A1c [[id number]]    Blood    Final result Component Value Units   Hemoglobin A1C 5.10 %          03/28/2023 1858 03/28/2023 2016 Respiratory Panel PCR w/COVID-19(SARS-CoV-2) KAJAL/MARILIN/DEBBIE/PAD/COR/MAD/DAVID In-House, NP Swab in UTM/VTM, 3-4 HR TAT - Swab, Nasopharynx [[id number]]    Swab from Nasopharynx    Final result Component Value   ADENOVIRUS, PCR Not Detected   Coronavirus 229E Not Detected   Coronavirus HKU1 Not Detected   Coronavirus NL63 Not Detected   Coronavirus OC43 Not Detected   COVID19 Not Detected   Human Metapneumovirus Not Detected   Human Rhinovirus/Enterovirus Not Detected   Influenza A PCR Not Detected   Influenza B PCR Not Detected   Parainfluenza Virus 1 Not Detected   Parainfluenza Virus 2 Not Detected   Parainfluenza Virus 3 Not Detected   Parainfluenza Virus 4 Not Detected   RSV, PCR Not Detected   Bordetella pertussis pcr Not Detected   Bordetella parapertussis PCR Not Detected   Chlamydophila pneumoniae PCR Not Detected   Mycoplasma pneumo by PCR Not Detected              03/28/2023 1844 03/28/2023 1918 Lactic Acid, Plasma [[id number]]   Blood from Arm, Right    Final result Component Value Units   Lactate 1.2 mmol/L          03/28/2023 1754 03/28/2023 1757 Blood Gas, Arterial - [[id number]]   (Abnormal)   Arterial Blood    Final result Component Value Units   Site Arterial: right radial    Brian's Test Positive    pH, Arterial 7.447 pH units   pCO2, Arterial 49.3 High  mm Hg   pO2, Arterial 109.9 High  mm Hg   HCO3, Arterial 34.0 High  mmol/L   Base Excess, Arterial 8.6 High  mmol/L   O2 Saturation Calculated 98.4  %   Barometric Pressure for Blood Gas 754.7 mmHg   Modality Cannula    Flow Rate 2 lpm   Rate 20 Breaths/minute          XR Chest 2 View [[id number]] Luis A as Reviewed   Order Status: Completed Collected: 03/28/23 1728    Updated: 03/28/23 1732   Narrative:     CHEST: 2 VIEWS       HISTORY: Shortness of air. History of COPD. Emphysema.       COMPARISON: Two-view chest 02/09/2023, CT chest 10/17/2022.       FINDINGS:There is severe pulmonary emphysema. Calcified nodules are   present within both upper lobes. There is pleural parenchymal scarring   that appears greatest within the left lung base and superimposing the   left cardiac border and this is without radiographic change.   Cardiomediastinal silhouette is not changed. Sternotomy wires and aortic   vascular calcifications are present. There is a prosthetic mitral valve.   The bones appear osteopenic.       Impression:     Advanced pulmonary emphysema with areas of pleural   parenchymal scarring without evidence for significant change. No   convincing evidence for acute or superimposed process.            PHYSICAL EXAM  Objective    Alert  nad  No resp distress  No wheezing  Chronically ill  Wishing to be discharged today    CONDITION ON DISCHARGE  Stable.      DISCHARGE DISPOSITION   Home or Self Care      DISCHARGE MEDICATIONS       Your medication list      START taking these medications      Instructions  Last Dose Given Next Dose Due   predniSONE 20 MG tablet  Commonly known as: DELTASONE  Start taking on: March 29, 2023      Take 1 tablet by mouth 2 (Two) Times a Day for 3 days, THEN 1 tablet Daily for 3 days.          CONTINUE taking these medications      Instructions Last Dose Given Next Dose Due   albuterol sulfate  (90 Base) MCG/ACT inhaler  Commonly known as: PROVENTIL HFA;VENTOLIN HFA;PROAIR HFA      Inhale 2 puffs Every 4 (Four) Hours As Needed for Wheezing.       amLODIPine 10 MG tablet  Commonly known as: NORVASC      TAKE 1 TABLET BY MOUTH EVERY DAY       arformoterol 15 MCG/2ML nebulizer solution  Commonly known as: BROVANA      Take 2 mL by nebulization 2 (Two) Times a Day.       aspirin 81 MG EC tablet      Take 1 tablet by mouth Daily.       atorvastatin 40 MG tablet  Commonly known as: LIPITOR      TAKE 1 TABLET BY MOUTH EVERY DAY       benzonatate 100 MG capsule  Commonly known as: TESSALON      TAKE 1 CAPSULE BY MOUTH 2 TIMES A DAY AS NEEDED FOR COUGH       budesonide 0.5 MG/2ML nebulizer solution  Commonly known as: PULMICORT      Take 2 mL by nebulization 2 (Two) Times a Day for 30 days. Indications: Chronic Obstructive Lung Disease, Chronic hypoxic respiratory failure       butalbital-acetaminophen-caffeine -40 MG per tablet  Commonly known as: FIORICET, ESGIC      Take 1 tablet by mouth Every 6 (Six) Hours As Needed for Headache.       carvedilol 6.25 MG tablet  Commonly known as: COREG      TAKE 1 TABLET BY MOUTH TWICE A DAY WITH MEALS       cyclobenzaprine 10 MG tablet  Commonly known as: FLEXERIL      TAKE 1 TABLET BY MOUTH EVERYDAY AT BEDTIME       fish oil 1000 MG capsule capsule      Take  by mouth Every Night.       furosemide 40 MG tablet  Commonly known as: LASIX      TAKE 1 TABLET BY MOUTH TWICE A DAY       HYDROcodone-acetaminophen 7.5-325 MG per tablet  Commonly known as: NORCO      Take 1 tablet by mouth Every 6 (Six) Hours As Needed for Moderate Pain (Chronic pain  medicine for low back pain).       ipratropium-albuterol 0.5-2.5 mg/3 ml nebulizer  Commonly known as: DUO-NEB      Take 3 mL by nebulization Every 4 (Four) Hours As Needed for Wheezing.       lisinopril 40 MG tablet  Commonly known as: PRINIVIL,ZESTRIL      Take 1 tablet by mouth Daily.       loratadine 10 MG tablet  Commonly known as: CLARITIN      Take 1 tablet by mouth 2 (two) times a day.       Magnesium Oxide 400 (240 Mg) MG tablet      TAKE 1 TABLET BY MOUTH EVERY DAY       meclizine 25 MG tablet  Commonly known as: ANTIVERT      Take 1 tablet by mouth 3 (Three) Times a Day As Needed for Dizziness. prn       Multivital tablet tablet  Generic drug: multivitamin with minerals      Take 1 tablet by mouth Daily.       Nebulizer device      1 Units 4 (Four) Times a Day As Needed (Wheezing). J44.1 j20.8       O2  Commonly known as: OXYGEN      Inhale 2 L/min Continuous.       omeprazole 40 MG capsule  Commonly known as: priLOSEC      TAKE 1 CAPSULE BY MOUTH EVERY DAY       ondansetron 4 MG tablet  Commonly known as: ZOFRAN      Take 1 tablet by mouth Every 12 (Twelve) Hours As Needed for Nausea or Vomiting.       polyethylene glycol packet  Commonly known as: MIRALAX      Take 17 g by mouth 2 (Two) Times a Day.       potassium chloride 20 MEQ CR tablet  Commonly known as: K-DURKLOR-CON      1 po tid       roflumilast 500 MCG tablet tablet  Commonly known as: DALIRESP      Take 1 tablet by mouth Daily.       rOPINIRole 2 MG tablet  Commonly known as: REQUIP      TAKE 1 TABLET BY MOUTH EVERY DAY AT NIGHT       sertraline 100 MG tablet  Commonly known as: ZOLOFT      TAKE 1 TABLET BY MOUTH EVERY DAY       Symjepi 0.3 MG/0.3ML solution prefilled syringe  Generic drug: EPINEPHrine           zolpidem 10 MG tablet  Commonly known as: AMBIEN      Take 1 tablet by mouth At Night As Needed for Sleep.          STOP taking these medications    potassium chloride 10 MEQ CR tablet              Where to Get Your Medications       These medications were sent to [id number] - [place], [place] - [address] AT [facility] - [phone]  - [phone] FX  [address] [facility] [address]    Phone: [phone]   · predniSONE 20 MG tablet          Future Appointments   Date Time Provider Department Center   7/12/2023  3:20 PM LAB CHAIR 5 [facility]  [facility]   7/12/2023  3:40 PM Daniela Shin MD PhD [facility]     Additional Instructions for the Follow-ups that You Need to Schedule     Discharge Follow-up with PCP   As directed       Currently Documented PCP:    Javan Martinez MD    PCP Phone Number:    [phone]     Follow Up Details: 1-2 weeks         Discharge Follow-up with Specified Provider: Dr. Melo 2-4 weeks or sooner if needed   As directed      To: Dr. Melo 2-4 weeks or sooner if needed            Follow-up Information     Javan Martinez MD .    Specialty: Internal Medicine  Why: 1-2 weeks  Contact information:  [address] [id number] [phone]             Javan Martinez MD .    Specialty: Internal Medicine  Why: 1-2 weeks  Contact information:  [address] [id number] [phone]                         TEST  RESULTS PENDING AT DISCHARGE         Sonny Burroughs MD  [facility]  03/29/23  12:28 EDT      Time: greater than 32 minutes on discharge  It was a pleasure taking care of this patient while in the hospital.

## 2023-03-29 NOTE — SIGNIFICANT NOTE
03/29/23 6946   OTHER   Discipline physical therapist   Rehab Time/Intention   Session Not Performed other (see comments)  (Per [facility] patient is at baseline and does not need skilled PT services. Patient confirmed DC. Will reassess at a later date/time if needed.)   Therapy Assessment/Plan (PT)   Criteria for Skilled Interventions Met (PT) no;no problems identified which require skilled intervention

## 2023-03-29 NOTE — PLAN OF CARE
Goal Outcome Evaluation:  Plan of Care Reviewed With: patient        Progress: improving  Outcome Evaluation: Pt reports overall feeling much better. No complaints of pain or any discomforts. Tolerating all PO meds. Switched to PO steroids. Stand by assist. Family at bedside. VSS. D/c today. Will continue to monitor.

## 2023-03-29 NOTE — H&P
HISTORY AND PHYSICAL   [facility]        Date of Admission: 3/28/2023  Patient Identification:  Name: Paz Browne  Age: 67 y.o.  Sex: female  :  1955  MRN: [id number]                     Primary Care Physician: Javan Martinez MD    Chief Complaint:  67 year old female who presented to the emergency room with shortness of breath for the last few days; she has had a dry cough; she got a covid booster a few days ago; she has been awakened by her shortness of breath at night; she is on home oxygen at 2 liters; no fever or chills; she was seen by her pcp last week and treated for a copd exacerbation but has not improved    History of Present Illness:   As above    Past Medical History:  Past Medical History:   Diagnosis Date   • VAN (acute kidney injury) (HCC)    • Anemia    • Asthma    • Atrial flutter (HCC)     cardioversion   • Cataract    • Celiac artery stenosis (HCC)    • Chronic respiratory failure with hypoxia (HCC)    • Colon polyp    • COPD (chronic obstructive pulmonary disease) (HCC)    • GI bleed    • Hiatal hernia    • History of CHF (congestive heart failure)     due to MR   • History of home oxygen therapy     3 lpm NC   • History of mitral valve replacement with tissue graft    • Hyperlipidemia    • Hypertension    • Infectious viral hepatitis     B   • Intertrigo    • Long term (current) use of anticoagulants    • Mitral regurgitation     s/p tissue MVR   • PAF (paroxysmal atrial fibrillation) (HCC)     s/p MAZE   • Pneumonia    • Pulmonary hypertension (HCC)    • S/P TVR (tricuspid valve repair) 2016     Past Surgical History:  Past Surgical History:   Procedure Laterality Date   • CARDIAC CATHETERIZATION  2014    Right dominant systemt, normal coronary arteries.    • CARDIAC CATHETERIZATION Left 6/10/2016    Procedure: Cardiac catheterization;  Surgeon: Sergei Hall MD;  Location: [facility] INVASIVE LOCATION;  Service:    • CARDIAC CATHETERIZATION N/A 6/10/2016     Procedure: Right Heart Cath;  Surgeon: Sergei Hall MD;  Location: [facility] CATH INVASIVE LOCATION;  Service:    • CATARACT EXTRACTION     • COLONOSCOPY     • COLONOSCOPY N/A 8/4/2017    Procedure: COLONOSCOPY TO CECUM/TI WITH POLYPECTOMY ( COLD BX);  Surgeon: Cleveland Devine MD;  Location: [facility] ENDOSCOPY;  Service:    • COLONOSCOPY N/A 8/10/2017    Procedure: COLONOSCOPY to cecum and TI with 2 clips placed at transverse;  Surgeon: Earnest PALOMO MD;  Location: [facility] ENDOSCOPY;  Service:    • COLONOSCOPY N/A 12/22/2017    Procedure: COLONOSCOPY INTO CECUM WITH COLD POLYPECTOMIES;  Surgeon: Cleveland Devine MD;  Location: [facility] ENDOSCOPY;  Service:    • COLONOSCOPY N/A 5/17/2021    Procedure: COLONOSCOPY to cecum;  Surgeon: Cleveland Devine MD;  Location: [facility] ENDOSCOPY;  Service: Gastroenterology;  Laterality: N/A;  Pre: Fe deficency anemia, h/x of polyps  Post: fair prep, normal   • ENDOSCOPY N/A 8/17/2017    Procedure: ESOPHAGOGASTRODUODENOSCOPY;  Surgeon: Porsha Ruby MD;  Location: [facility] ENDOSCOPY;  Service:    • ENDOSCOPY N/A 12/22/2017    Procedure: ESOPHAGOGASTRODUODENOSCOPY WITH BIOPSIES;  Surgeon: Cleveland Devine MD;  Location: [facility] ENDOSCOPY;  Service:    • ENDOSCOPY N/A 5/17/2021    Procedure: ESOPHAGOGASTRODUODENOSCOPY  with biopsies;  Surgeon: Cleveland Devine MD;  Location: [facility] ENDOSCOPY;  Service: Gastroenterology;  Laterality: N/A;  Pre: Fe deficency anemia, nausea, heme positive stool   Post: gastritis, sloughing of distal esophagus mucosa   • GALLBLADDER SURGERY     • HEMORRHOIDECTOMY     • HYSTERECTOMY     • KIDNEY SURGERY  04/22/2013    Stent placement   • MAZE PROCEDURE     • MITRAL VALVE REPLACEMENT     • TONSILLECTOMY     • TRICUSPID VALVE REPLACEMENT        Home Meds:  (Not in a hospital admission)      Allergies:  Allergies   Allergen Reactions   • Bupropion Itching   • Cephalexin Itching     Tolerated piperacillin/tazobactam, ampicillin, cefdinir, and ceftriaxone   • Metoprolol  Itching     Immunizations:  Immunization History   Administered Date(s) Administered   • COVID-19 (PFIZER) BIVALENT BOOSTER 12+YRS 2023   • COVID-19 (PFIZER) PURPLE CAP 2021, 2021, 2021   • Covid-19 (Pfizer) Gray Cap 2022   • FLUAD TRI 65YR+ 10/31/2016, 2018, 2019   • FluMist 2-49yrs 10/30/2015   • Fluad Quad 65+ 2020, 2021   • Fluzone High Dose =>65 Years ([employer] ONLY) 2017, 10/23/2018, 2021   • Fluzone High-Dose 65+yrs 2021   • Pneumococcal Conjugate 13-Valent (PCV13) 2016   • Pneumococcal Polysaccharide (PPSV23) 10/30/2015   • Tdap 2016     Social History:   Social History     Social History Narrative   • Not on file     Social History     Socioeconomic History   • Marital status:    • Number of children: 2   Tobacco Use   • Smoking status: Former     Packs/day: 3.00     Years: 54.00     Pack years: 162.00     Types: Cigarettes     Quit date:      Years since quittin.2   • Smokeless tobacco: Never   • Tobacco comments:     caffeine - tea or mt dew    Vaping Use   • Vaping Use: Never used   Substance and Sexual Activity   • Alcohol use: No   • Drug use: No   • Sexual activity: Defer       Family History:  Family History   Adopted: Yes   Problem Relation Age of Onset   • No Known Problems Mother    • No Known Problems Father         Review of Systems  See history of present illness and past medical history.  Patient denies headache, dizziness, syncope, falls, trauma, change in vision, change in hearing, change in taste, changes in weight, changes in appetite, focal weakness, numbness, or paresthesia.  Patient denies chest pain, palpitations,  sinus pressure, rhinorrhea, epistaxis, hemoptysis, nausea, vomiting,hematemesis, diarrhea, constipation or hematchezia.  Denies cold or heat intolerance, polydipsia, polyuria, polyphagia. Denies hematuria, pyuria, dysuria, hesitancy, frequency or urgency. Denies consumption of  "raw and under cooked meats foods or change in water source.  Denies fever, chills, sweats, night sweats.  Denies missing any routine medications. Remainder of ROS is negative.    Objective:  T Max 24 hrs: Temp (24hrs), Av.5 °F (36.9 °C), Min:98.5 °F (36.9 °C), Max:98.5 °F (36.9 °C)    Vitals Ranges:   Temp:  [98.5 °F (36.9 °C)] 98.5 °F (36.9 °C)  Heart Rate:  [72-85] 72  Resp:  [20-24] 20  BP: (158-168)/(67-85) 158/80      Exam:  /80   Pulse 72   Temp 98.5 °F (36.9 °C) (Tympanic)   Resp 20   Ht 170.2 cm (67\")   Wt 59.9 kg (132 lb)   SpO2 98%   BMI 20.67 kg/m²     General Appearance:  ; thin, chronically ill appearing; no acute distress   Head:    Normocephalic, without obvious abnormality, atraumatic   Eyes:    PERRL, conjunctivae/corneas clear, EOM's intact, both eyes   Ears:    Normal external ear canals, both ears   Nose:   Nares normal, septum midline, mucosa normal, no drainage    or sinus tenderness   Throat:   Lips, mucosa, and tongue normal   Neck:   Supple, symmetrical, trachea midline, no adenopathy;     thyroid:  no enlargement/tenderness/nodules; no carotid    bruit or JVD   Back:     Symmetric, no curvature, ROM normal, no CVA tenderness   Lungs:     Diminished breath sounds bilaterally, respirations unlabored   Chest Wall:    No tenderness or deformity    Heart:    Regular rate and rhythm, S1 and S2 normal, no murmur, rub   or gallop   Abdomen:     Soft, nontender, bowel sounds active all four quadrants,     no masses, no hepatomegaly, no splenomegaly   Extremities:   Extremities normal, atraumatic, no cyanosis or edema   Pulses:   2+ and symmetric all extremities   Skin:   Skin color, texture, turgor normal, no rashes or lesions               .    Data Review:  Labs in chart were reviewed.  WBC   Date Value Ref Range Status   2023 9.42 3.40 - 10.80 10*3/mm3 Final     Hemoglobin   Date Value Ref Range Status   2023 11.6 (L) 12.0 - 15.9 g/dL Final     Hematocrit   Date Value " Ref Range Status   03/28/2023 34.7 34.0 - 46.6 % Final     Platelets   Date Value Ref Range Status   03/28/2023 215 140 - 450 10*3/mm3 Final     Sodium   Date Value Ref Range Status   03/28/2023 140 136 - 145 mmol/L Final     Potassium   Date Value Ref Range Status   03/28/2023 3.5 3.5 - 5.2 mmol/L Final     Chloride   Date Value Ref Range Status   03/28/2023 95 (L) 98 - 107 mmol/L Final     CO2   Date Value Ref Range Status   03/28/2023 32.5 (H) 22.0 - 29.0 mmol/L Final     BUN   Date Value Ref Range Status   03/28/2023 8 8 - 23 mg/dL Final     Creatinine   Date Value Ref Range Status   03/28/2023 0.89 0.57 - 1.00 mg/dL Final     Glucose   Date Value Ref Range Status   03/28/2023 126 (H) 65 - 99 mg/dL Final     Calcium   Date Value Ref Range Status   03/28/2023 10.1 8.6 - 10.5 mg/dL Final                Imaging Results (All)     Procedure Component Value Units Date/Time    XR Chest 2 View [[id number]] Collected: 03/28/23 1728     Updated: 03/28/23 1732    Narrative:      CHEST: 2 VIEWS     HISTORY: Shortness of air. History of COPD. Emphysema.     COMPARISON: Two-view chest 02/09/2023, CT chest 10/17/2022.     FINDINGS:There is severe pulmonary emphysema. Calcified nodules are  present within both upper lobes. There is pleural parenchymal scarring  that appears greatest within the left lung base and superimposing the  left cardiac border and this is without radiographic change.  Cardiomediastinal silhouette is not changed. Sternotomy wires and aortic  vascular calcifications are present. There is a prosthetic mitral valve.  The bones appear osteopenic.       Impression:      Advanced pulmonary emphysema with areas of pleural  parenchymal scarring without evidence for significant change. No  convincing evidence for acute or superimposed process.     This report was finalized on 3/28/2023 5:29 PM by Dr. Magdi Greer M.D.               Assessment:  Active Hospital Problems    Diagnosis  POA   • **COPD with acute  exacerbation (HCC) [J44.1]  Yes      Resolved Hospital Problems   No resolved problems to display.   chronic hypoxic respiratory failure  Hyperglycemia  Hypertension  Underweight  paf  Pulmonary hypertension  hyperlipidemia    Plan:  Continue steroids, mininebs  Monitor on telemetry  Trend labs  accu checks, sliding scale  dw patient, family and ED provider    Susanne Bello MD  3/28/2023  21:24 EDT

## 2023-03-29 NOTE — PLAN OF CARE
Goal Outcome Evaluation:  Plan of Care Reviewed With: patient, spouse        Progress: no change  Outcome Evaluation: Patient was admitted last night with COPD exacerbation. On 2 L O2. Some home meds have been restarted. Chronic back pain, Norco given. Up with stand-by assist to BSC, SOA with exertion. ACHS. Daily weight. Overall VSS. WCTM.

## 2023-03-30 ENCOUNTER — TRANSITIONAL CARE MANAGEMENT TELEPHONE ENCOUNTER (OUTPATIENT)
Dept: CALL CENTER | Facility: HOSPITAL | Age: 68
End: 2023-03-30
Payer: MEDICARE

## 2023-03-30 NOTE — OUTREACH NOTE
Call Center TCM Note    Flowsheet Row Responses   [facility] patient discharged from? [place]   Does the patient have one of the following disease processes/diagnoses(primary or secondary)? COPD   TCM attempt successful? Yes   Call start time 0955   Call end time 0959   Discharge diagnosis COPD with acute exacerbation    Meds reviewed with patient/caregiver? Yes   Is the patient having any side effects they believe may be caused by any medication additions or changes? No   Does the patient have all medications ordered at discharge? Yes   Is the patient taking all medications as directed (includes completed medication regime)? Yes   Has home health visited the patient within 72 hours of discharge? N/A   Pulse Ox monitoring None   Psychosocial issues? No   Did the patient receive a copy of their discharge instructions? Yes   Nursing interventions Reviewed instructions with patient   What is the patient's perception of their health status since discharge? Improving   If the patient is a current smoker, are they able to teach back resources for cessation? Not a smoker   TCM call completed? Yes   Wrap up additional comments D/C DX: COPD with acute exacerbation - Pt feeling much much better. New rx Prednisone in place. Breathing greatly improved. No questions. Pt had me speake with  re: appts. He will call to sched as he trys to coordinate PCP and PULM same day since they live out of town.    Call end time 0959          Chelo Multani MA    3/30/2023, 10:03 EDT

## 2023-03-30 NOTE — OUTREACH NOTE
Prep Survey    Flowsheet Row Responses   [facility] patient discharged from? [place]   Is LACE score < 7 ? No   Eligibility [facility]   Date of Admission 03/28/23   Date of Discharge 03/29/23   Discharge Disposition Home or Self Care   Discharge diagnosis COPD with acute exacerbation    Does the patient have one of the following disease processes/diagnoses(primary or secondary)? COPD   Does the patient have Home health ordered? No   Is there a DME ordered? No   Prep survey completed? Yes          Jeni MCGOVERN - Registered Nurse      .

## 2023-03-30 NOTE — CASE MANAGEMENT/SOCIAL WORK
Discharge Planning Assessment  [facility]     Patient Name: Paz Browne  MRN: [id number]  Today's Date: 3/30/2023    Admit Date: 3/28/2023    Plan: Home with family to transport   Discharge Needs Assessment    No documentation.                Discharge Plan     Row Name 03/30/23 0954       Plan    Plan Home with family to transport    Patient/Family in Agreement with Plan yes    Plan Comments Spoke with patient at bedside. Introduced self and explained CCP role. Verified facesheet and local pharmacy [facility] Patient denies difficulty with medication cost/management. Patient lives in a trailer home with spouse/ [place] and brother; has a ramp to enter home. Patient states she has used HH in the past but could not recall company name. Denies prior SNF history. Patient has a wheel chair, shower chair, and oxygen through [employer]. Patient spouse helps with ADLs when needed; patient reports mostly IADL and still drives. Patient denies needs for CCP at this time; plans to return home with family to transport.              Continued Care and Services - Discharged on 3/29/2023 Admission date: 3/28/2023 - Discharge disposition: Home or Self Care   Coordination has not been started for this encounter.       Expected Discharge Date and Time     Expected Discharge Date Expected Discharge Time    Mar 29, 2023          Demographic Summary     Row Name 03/30/23 0953       General Information    Admission Type observation    Arrived From emergency department    Required Notices Provided Observation Status Notice    Referral Source admission list    Reason for Consult discharge planning    Preferred Language English       Contact Information    Permission Granted to Share Info With                Functional Status     Row Name 03/30/23 0954       Functional Status    Usual Activity Tolerance good    Current Activity Tolerance good       Functional Status, IADL    Medications independent    Meal Preparation independent     Housekeeping independent;assistive person    Laundry independent;assistive person    Shopping assistive person;independent       Mental Status    General Appearance WDL WDL       Employment/    Employment Status retired               Psychosocial    No documentation.                Abuse/Neglect    No documentation.                Legal    No documentation.                Substance Abuse    No documentation.                Patient Forms    No documentation.                   Kirti Harris RN

## 2023-03-30 NOTE — CASE MANAGEMENT/SOCIAL WORK
Case Management Discharge Note      Final Note: Discharge home with family         Selected Continued Care - Discharged on 3/29/2023 Admission date: 3/28/2023 - Discharge disposition: Home or Self Care    Destination    No services have been selected for the patient.              Durable Medical Equipment    No services have been selected for the patient.              Dialysis/Infusion    No services have been selected for the patient.              Home Medical Care    No services have been selected for the patient.              Therapy    No services have been selected for the patient.              Community Resources    No services have been selected for the patient.              Community & DME    No services have been selected for the patient.                       Final Discharge Disposition Code: 01 - home or self-care

## 2023-04-06 ENCOUNTER — TELEPHONE (OUTPATIENT)
Dept: FAMILY MEDICINE CLINIC | Facility: CLINIC | Age: 68
End: 2023-04-06
Payer: MEDICARE

## 2023-04-06 NOTE — TELEPHONE ENCOUNTER
Patient informed to go to [facility] has to be seen, electric out patient wants us to seen message to bo just in case for tommorrow

## 2023-04-06 NOTE — TELEPHONE ENCOUNTER
Caller: PavanChapincito    Relationship: Emergency Contact    Best call back number: [phone]    What medication are you requesting: SOMETHING FOR THRUSH     What are your current symptoms: THRUSH IN MOUTH     How long have you been experiencing symptoms: 2 DAYS    Have you had these symptoms before:    [x] Yes  [] No    Have you been treated for these symptoms before:   [x] Yes  [] No    If a prescription is needed, what is your preferred pharmacy and phone number: [id number] - [facility] [address] - [phone] [facility] [phone] FX     Additional notes:

## 2023-04-07 DIAGNOSIS — B37.81 THRUSH OF MOUTH AND ESOPHAGUS: Primary | ICD-10-CM

## 2023-04-07 DIAGNOSIS — B37.0 THRUSH OF MOUTH AND ESOPHAGUS: Primary | ICD-10-CM

## 2023-04-07 RX ORDER — FLUCONAZOLE 100 MG/1
100 TABLET ORAL DAILY
Qty: 14 TABLET | Refills: 3 | Status: SHIPPED | OUTPATIENT
Start: 2023-04-07

## 2023-04-10 ENCOUNTER — TELEPHONE (OUTPATIENT)
Dept: FAMILY MEDICINE CLINIC | Facility: CLINIC | Age: 68
End: 2023-04-10

## 2023-04-10 DIAGNOSIS — E87.6 HYPOKALEMIA: Primary | ICD-10-CM

## 2023-04-10 NOTE — TELEPHONE ENCOUNTER
Caller: Romana Browne    Relationship to patient: Emergency Contact    Best call back number: [phone]    Type of visit: NURSE AND LAB    Requested date: TUE, WED OR THUR OF THIS WEEK    If rescheduling, when is the original appointment: TODAY AT 11:15 AND 11:30    Additional notes:PATIENTS  HAS TO WORK ON Monday AND FRIDAYS.  HE REQUESTED A TUE, WED OR THURS.    ROMANA REQUESTING A CALL BACK TO LET HIM KNOW WHEN THESE ARE RESCHEDULED.  HE IS ALSO REQUESTING THE DAY AND TIME TO BE GIVEN TO HIM    ATTEMPTED A WARM TRANSFER TWICE, UNSUCCESSFUL.

## 2023-04-11 DIAGNOSIS — E87.6 HYPOKALEMIA: ICD-10-CM

## 2023-04-11 LAB
BUN SERPL-MCNC: 13 MG/DL (ref 8–23)
BUN/CREAT SERPL: 11.1 (ref 7–25)
CALCIUM SERPL-MCNC: 10.1 MG/DL (ref 8.6–10.5)
CHLORIDE SERPL-SCNC: 98 MMOL/L (ref 98–107)
CO2 SERPL-SCNC: 31.8 MMOL/L (ref 22–29)
CREAT SERPL-MCNC: 1.17 MG/DL (ref 0.57–1)
EGFRCR SERPLBLD CKD-EPI 2021: 50.6 ML/MIN/1.73
GLUCOSE SERPL-MCNC: 102 MG/DL (ref 65–99)
MAGNESIUM SERPL-MCNC: 2.3 MG/DL (ref 1.6–2.4)
POTASSIUM SERPL-SCNC: 4.4 MMOL/L (ref 3.5–5.2)
SODIUM SERPL-SCNC: 137 MMOL/L (ref 136–145)

## 2023-04-13 ENCOUNTER — TELEPHONE (OUTPATIENT)
Dept: FAMILY MEDICINE CLINIC | Facility: CLINIC | Age: 68
End: 2023-04-13
Payer: MEDICARE

## 2023-04-13 RX ORDER — HYDROCODONE BITARTRATE AND ACETAMINOPHEN 7.5; 325 MG/1; MG/1
1 TABLET ORAL EVERY 6 HOURS PRN
Qty: 90 TABLET | Refills: 0 | Status: SHIPPED | OUTPATIENT
Start: 2023-04-13 | End: 2023-04-16 | Stop reason: SDUPTHER

## 2023-04-13 RX ORDER — HYDROCODONE BITARTRATE AND ACETAMINOPHEN 5; 325 MG/1; MG/1
1 TABLET ORAL EVERY 6 HOURS PRN
Qty: 30 TABLET | Refills: 0 | Status: SHIPPED | OUTPATIENT
Start: 2023-04-13 | End: 2023-04-19 | Stop reason: DRUGHIGH

## 2023-04-13 NOTE — TELEPHONE ENCOUNTER
Caller: Paz Browne    Relationship: Self    Best call back number: [phone]    What is the best time to reach you: ANY     Who are you requesting to speak with (clinical staff, provider,  specific staff member): CLINICAL STAFF     What was the call regarding: WOULD LIKE TO KNOW IF LABS FROM Tuesday ARE BACK YET.     Do you require a callback: YES

## 2023-04-13 NOTE — TELEPHONE ENCOUNTER
.    Caller: Paz Browne    Relationship: Self    Best call back number: [phone]    Requested Prescriptions:   Requested Prescriptions     Pending Prescriptions Disp Refills   • HYDROcodone-acetaminophen (NORCO) 7.5-325 MG per tablet 90 tablet 0     Sig: Take 1 tablet by mouth Every 6 (Six) Hours As Needed for Moderate Pain (Chronic pain medicine for low back pain).        Pharmacy where request should be sent: [id number] [address] - [phone] [facility] [phone] FX     Last office visit with prescribing clinician: 3/24/2023   Last telemedicine visit with prescribing clinician: Visit date not found   Next office visit with prescribing clinician: Visit date not found     Additional details provided by patient:     Does the patient have less than a 3 day supply:  [x] Yes  [] No    Would you like a call back once the refill request has been completed: [] Yes [] No    If the office needs to give you a call back, can they leave a voicemail: [] Yes [] No    Harrison Khalil Rep   04/13/23 09:55 EDT         DELETE AFTER READING TO PATIENT: “Thank you for sharing this information with me. I will send a message to the clinical team. Please allow 48 hours for the clinical staff to follow up on this request.”   No

## 2023-04-14 RX ORDER — HYDROCODONE BITARTRATE AND ACETAMINOPHEN 7.5; 325 MG/1; MG/1
1 TABLET ORAL EVERY 6 HOURS PRN
Qty: 90 TABLET | Refills: 0 | OUTPATIENT
Start: 2023-04-14

## 2023-04-14 NOTE — TELEPHONE ENCOUNTER
I have a message that this is on backorder and gave her hydrocodone for 5/325 until she can get the other dose.   INTERNAL MEDICINE  DISCHARGE SUMMARY        MRN: [id number]    GENERAL INFORMATION:  - Admission Date/Time: 4/7/2021 12:16 PM  - Discharge Date/Time: 4/11/2021  1:15 PM  - PCP: Jacky Jarquin MD  - Consultants:  1 Cardiologists    Discharge Diagnoses:  Severe dilated cardiomyopathy  2  Severe mitral regurgitation  3  Acute on chronic systolic heart failure  4 HTN  5 GOUT  6 CKD3    Hospital Course:     65 yrs old man came to ER with SOB,adm found out severe cardiomyopathy and muti vessel cad-plan for s.rgery,pt admantly wants to go home and come back later inspitee of knowing risk of death,etc  Physical Exam  Vitals and nursing note reviewed.   Constitutional:       Appearance: Normal appearance.   HENT:      Head: Normocephalic and atraumatic.      Mouth/Throat:      Mouth: Mucous membranes are moist.      Pharynx: Oropharynx is clear.   Eyes:      Extraocular Movements: Extraocular movements intact.      Conjunctiva/sclera: Conjunctivae normal.      Pupils: Pupils are equal, round, and reactive to light.   Cardiovascular:      Rate and Rhythm: Normal rate and regular rhythm.      Pulses: Normal pulses.      Heart sounds: Normal heart sounds.   Pulmonary:      Effort: Pulmonary effort is normal. No respiratory distress.      Breath sounds: Normal breath sounds. No wheezing.   Abdominal:      General: Abdomen is flat. Bowel sounds are normal. There is no distension.      Palpations: Abdomen is soft.      Tenderness: There is no abdominal tenderness.   Musculoskeletal:         General: No swelling. Normal range of motion.      Cervical back: Normal range of motion and neck supple.      Right lower leg: No edema.      Left lower leg: No edema.   Skin:     General: Skin is warm and dry.      Coloration: Skin is not jaundiced.   Neurological:      General: No focal deficit present.      Mental Status: He is alert and oriented to person, place, and time.   Psychiatric:         Mood and Affect: Mood normal.         Behavior:  Behavior normal.          Lab Results:  No results found for this or any previous visit (from the past 24 hour(s)).    Imaging:  Cath/PV Case   Final Result      US VASC EXTREMITY LOWER VENOUS DUPLEX   Final Result   1.   No evidence of DVT in the bilateral lower extremities.   2.   Small 3.5 cm mildly septated Baker's cyst in the right popliteal   fossa.      Electronically Signed by: CHIKI REID MD    Signed on: 4/8/2021 12:06 PM          CTA CHEST PULMONARY EMBOLISM W CONTRAST   Final Result   1.    No acute pulmonary embolism.        2.   CT findings of decompensated heart failure including cardiomegaly with   four-chamber enlargement, reflux of contrast in the hepatic veins,   pulmonary edema, and small right/trace left pleural effusion.                  Electronically Signed by: RICK HERNÁNDEZ M.D.    Signed on: 4/7/2021 3:35 PM          XR CHEST PA OR AP 1 VIEW   Final Result   No acute cardiopulmonary abnormality. Normal chest radiograph.        Electronically Signed by: RICK HERNÁNDEZ M.D.    Signed on: 4/7/2021 1:32 PM               Pathology/CX results:  No specimens collected during this procedure.    Microbiology Results     None           Discharge Medications:     Summary of your Discharge Medications      Take these Medications      Details   aspirin 81 MG chewable tablet  Notes to patient: Heart and circulation    Chew 1 tablet by mouth daily. Do not start before April 12, 2021.     atorvastatin 20 MG tablet  Commonly known as: LIPITOR  Notes to patient: Cholesterol    Take 1 tablet by mouth nightly.     carvedilol 6.25 MG tablet  Commonly known as: COREG  Notes to patient: Blood pressure and heart rate   Take 1 tablet by mouth every 12 hours.     colchicine 0.6 MG tablet  Commonly known as: COLCRYS  Notes to patient: For gout    Take 0.6 mg by mouth as needed. Indications: Gout Pt takes as needed for gout flare ups. Unsure of exact dose.     fluticasone-vilanterol 100-25 MCG/INH  inhaler  Commonly known as: BREO ELLIPTA  Notes to patient: COPD   Inhale 1 puff into the lungs daily. Do not start before April 12, 2021.     furosemide 40 MG tablet  Commonly known as: LASIX  Notes to patient: Push out excess fluid (diuretic)    Take 40 mg by mouth daily.     lisinopril 5 MG tablet  Commonly known as: ZESTRIL  Notes to patient: Take for blood pressure   Take 1 tablet by mouth daily. Do not start before April 12, 2021.     loperamide 2 MG capsule  Commonly known as: IMODIUM  Notes to patient: Take as needed for diarrhea    Take 2 mg by mouth 4 times daily as needed for Diarrhea.     magnesium oxide 400 (241.3 Mg) MG Tab  Notes to patient: For magnesium   Take 1 tablet by mouth daily. Do not start before April 12, 2021.     pantoprazole 40 MG tablet  Commonly known as: PROTONIX  Notes to patient: For acid reflux    Take 1 tablet by mouth nightly.              Discharge Instructions:  Follow-up with primary care physician in 3-5 days.  Take medication as prescribed.      Primary Care Physician:  Jacky Jarquin MD          I spent 35 minutes on the discharge.       Jacky Jarquin MD  4/21/2021 11:29 AM

## 2023-04-18 RX ORDER — ZOLPIDEM TARTRATE 10 MG/1
10 TABLET ORAL NIGHTLY PRN
Qty: 30 TABLET | Refills: 3 | Status: SHIPPED | OUTPATIENT
Start: 2023-04-18 | End: 2023-04-19 | Stop reason: SDUPTHER

## 2023-04-18 RX ORDER — HYDROCODONE BITARTRATE AND ACETAMINOPHEN 7.5; 325 MG/1; MG/1
1 TABLET ORAL EVERY 6 HOURS PRN
Qty: 90 TABLET | Refills: 0 | Status: SHIPPED | OUTPATIENT
Start: 2023-04-18 | End: 2023-04-19 | Stop reason: SDUPTHER

## 2023-04-18 RX ORDER — BUDESONIDE 0.5 MG/2ML
0.5 INHALANT ORAL
Qty: 360 ML | Refills: 3 | Status: SHIPPED | OUTPATIENT
Start: 2023-04-18 | End: 2023-04-19 | Stop reason: SDUPTHER

## 2023-04-19 ENCOUNTER — TELEPHONE (OUTPATIENT)
Dept: FAMILY MEDICINE CLINIC | Facility: CLINIC | Age: 68
End: 2023-04-19
Payer: MEDICARE

## 2023-04-19 PROBLEM — M54.50 CHRONIC BILATERAL LOW BACK PAIN: Status: ACTIVE | Noted: 2023-04-19

## 2023-04-19 PROBLEM — G89.29 CHRONIC BILATERAL LOW BACK PAIN: Status: ACTIVE | Noted: 2023-04-19

## 2023-04-19 RX ORDER — HYDROCODONE BITARTRATE AND ACETAMINOPHEN 7.5; 325 MG/1; MG/1
1 TABLET ORAL EVERY 6 HOURS PRN
Qty: 90 TABLET | Refills: 0 | Status: CANCELLED | OUTPATIENT
Start: 2023-04-19

## 2023-04-19 RX ORDER — ZOLPIDEM TARTRATE 10 MG/1
10 TABLET ORAL NIGHTLY PRN
Qty: 30 TABLET | Refills: 3 | Status: SHIPPED | OUTPATIENT
Start: 2023-04-19

## 2023-04-19 RX ORDER — BUDESONIDE 0.5 MG/2ML
0.5 INHALANT ORAL
Qty: 360 ML | Refills: 3 | Status: SHIPPED | OUTPATIENT
Start: 2023-04-19 | End: 2023-05-19

## 2023-04-19 RX ORDER — ZOLPIDEM TARTRATE 10 MG/1
10 TABLET ORAL NIGHTLY PRN
Qty: 30 TABLET | Refills: 3 | Status: CANCELLED | OUTPATIENT
Start: 2023-04-19

## 2023-04-19 RX ORDER — HYDROCODONE BITARTRATE AND ACETAMINOPHEN 7.5; 325 MG/1; MG/1
1 TABLET ORAL EVERY 6 HOURS PRN
Qty: 90 TABLET | Refills: 0 | Status: SHIPPED | OUTPATIENT
Start: 2023-04-19

## 2023-04-19 NOTE — TELEPHONE ENCOUNTER
Patient wants hydrocodone 7.5/325 mg 1 tid sent to [facility] they called and they have them also wants you to send Ambien there it is closer to their house.

## 2023-04-19 NOTE — TELEPHONE ENCOUNTER
Caller: [id number] - [place], [place] - [address] AT [facility] OF [address] [facility] & [place] - [phone] [facility] [phone] FX    Relationship: Pharmacy    Best call back number: [phone]    What was the call regarding: DANNY STATES THE PHARMACY IS NEEDING A DIAGNOSIS CODE FOR THE HYDROcodone-acetaminophen (NORCO) BEFORE THEY CAN GET IT REFILLED     PLEASE CALL AND ADVISE

## 2023-04-26 DIAGNOSIS — B37.0 CANDIDIASIS OF MOUTH: Primary | ICD-10-CM

## 2023-04-26 RX ORDER — FLUCONAZOLE 100 MG/1
100 TABLET ORAL DAILY
Qty: 2 TABLET | Refills: 0 | Status: SHIPPED | OUTPATIENT
Start: 2023-04-26

## 2023-06-09 ENCOUNTER — APPOINTMENT (OUTPATIENT)
Dept: CT IMAGING | Facility: HOSPITAL | Age: 68
End: 2023-06-09
Payer: MEDICARE

## 2023-06-09 ENCOUNTER — HOSPITAL ENCOUNTER (EMERGENCY)
Facility: HOSPITAL | Age: 68
Discharge: HOME | End: 2023-06-09
Attending: EMERGENCY MEDICINE
Payer: MEDICARE

## 2023-06-09 VITALS
OXYGEN SATURATION: 95 % | DIASTOLIC BLOOD PRESSURE: 79 MMHG | HEART RATE: 75 BPM | BODY MASS INDEX: 20.4 KG/M2 | RESPIRATION RATE: 16 BRPM | TEMPERATURE: 98.3 F | HEIGHT: 67 IN | SYSTOLIC BLOOD PRESSURE: 143 MMHG | WEIGHT: 130 LBS

## 2023-06-09 DIAGNOSIS — I48.0 PAF (PAROXYSMAL ATRIAL FIBRILLATION): ICD-10-CM

## 2023-06-09 DIAGNOSIS — K83.8 DILATION OF COMMON BILE DUCT: ICD-10-CM

## 2023-06-09 DIAGNOSIS — I50.32 CHRONIC DIASTOLIC CHF (CONGESTIVE HEART FAILURE): ICD-10-CM

## 2023-06-09 DIAGNOSIS — A09 INFECTIOUS DIARRHEA: Primary | ICD-10-CM

## 2023-06-09 DIAGNOSIS — E87.6 HYPOKALEMIA: ICD-10-CM

## 2023-06-09 LAB
ADV 40+41 DNA STL QL NAA+NON-PROBE: NOT DETECTED
ALBUMIN SERPL-MCNC: 4.2 G/DL (ref 3.5–5.2)
ALBUMIN/GLOB SERPL: 1.6 G/DL
ALP SERPL-CCNC: 110 U/L (ref 39–117)
ALT SERPL W P-5'-P-CCNC: 50 U/L (ref 1–33)
ANION GAP SERPL CALCULATED.3IONS-SCNC: 12.2 MMOL/L (ref 5–15)
AST SERPL-CCNC: 57 U/L (ref 1–32)
ASTRO TYP 1-8 RNA STL QL NAA+NON-PROBE: NOT DETECTED
BASOPHILS # BLD AUTO: 0.05 10*3/MM3 (ref 0–0.2)
BASOPHILS NFR BLD AUTO: 0.6 % (ref 0–1.5)
BILIRUB SERPL-MCNC: 1 MG/DL (ref 0–1.2)
BILIRUB UR QL STRIP: NEGATIVE
BUN SERPL-MCNC: 13 MG/DL (ref 8–23)
BUN/CREAT SERPL: 12.1 (ref 7–25)
C CAYETANENSIS DNA STL QL NAA+NON-PROBE: NOT DETECTED
C COLI+JEJ+UPSA DNA STL QL NAA+NON-PROBE: NOT DETECTED
CALCIUM SPEC-SCNC: 9.6 MG/DL (ref 8.6–10.5)
CHLORIDE SERPL-SCNC: 93 MMOL/L (ref 98–107)
CLARITY UR: CLEAR
CO2 SERPL-SCNC: 28.8 MMOL/L (ref 22–29)
COLOR UR: YELLOW
CREAT SERPL-MCNC: 1.07 MG/DL (ref 0.57–1)
CRYPTOSP DNA STL QL NAA+NON-PROBE: NOT DETECTED
D-LACTATE SERPL-SCNC: 0.8 MMOL/L (ref 0.5–2)
DEPRECATED RDW RBC AUTO: 38.4 FL (ref 37–54)
E HISTOLYT DNA STL QL NAA+NON-PROBE: NOT DETECTED
EAEC PAA PLAS AGGR+AATA ST NAA+NON-PRB: NOT DETECTED
EC STX1+STX2 GENES STL QL NAA+NON-PROBE: NOT DETECTED
EGFRCR SERPLBLD CKD-EPI 2021: 56 ML/MIN/1.73
EOSINOPHIL # BLD AUTO: 0.14 10*3/MM3 (ref 0–0.4)
EOSINOPHIL NFR BLD AUTO: 1.6 % (ref 0.3–6.2)
EPEC EAE GENE STL QL NAA+NON-PROBE: NOT DETECTED
ERYTHROCYTE [DISTWIDTH] IN BLOOD BY AUTOMATED COUNT: 12.7 % (ref 12.3–15.4)
ETEC LTA+ST1A+ST1B TOX ST NAA+NON-PROBE: NOT DETECTED
G LAMBLIA DNA STL QL NAA+NON-PROBE: NOT DETECTED
GLOBULIN UR ELPH-MCNC: 2.7 GM/DL
GLUCOSE SERPL-MCNC: 114 MG/DL (ref 65–99)
GLUCOSE UR STRIP-MCNC: NEGATIVE MG/DL
HCT VFR BLD AUTO: 38.3 % (ref 34–46.6)
HGB BLD-MCNC: 13.4 G/DL (ref 12–15.9)
HGB UR QL STRIP.AUTO: NEGATIVE
HOLD SPECIMEN: NORMAL
HOLD SPECIMEN: NORMAL
IMM GRANULOCYTES # BLD AUTO: 0.1 10*3/MM3 (ref 0–0.05)
IMM GRANULOCYTES NFR BLD AUTO: 1.1 % (ref 0–0.5)
KETONES UR QL STRIP: NEGATIVE
LEUKOCYTE ESTERASE UR QL STRIP.AUTO: NEGATIVE
LIPASE SERPL-CCNC: 30 U/L (ref 13–60)
LYMPHOCYTES # BLD AUTO: 0.71 10*3/MM3 (ref 0.7–3.1)
LYMPHOCYTES NFR BLD AUTO: 7.9 % (ref 19.6–45.3)
MCH RBC QN AUTO: 29.3 PG (ref 26.6–33)
MCHC RBC AUTO-ENTMCNC: 35 G/DL (ref 31.5–35.7)
MCV RBC AUTO: 83.6 FL (ref 79–97)
MONOCYTES # BLD AUTO: 0.35 10*3/MM3 (ref 0.1–0.9)
MONOCYTES NFR BLD AUTO: 3.9 % (ref 5–12)
NEUTROPHILS NFR BLD AUTO: 7.68 10*3/MM3 (ref 1.7–7)
NEUTROPHILS NFR BLD AUTO: 84.9 % (ref 42.7–76)
NITRITE UR QL STRIP: NEGATIVE
NOROVIRUS GI+II RNA STL QL NAA+NON-PROBE: NOT DETECTED
NRBC BLD AUTO-RTO: 0 /100 WBC (ref 0–0.2)
P SHIGELLOIDES DNA STL QL NAA+NON-PROBE: NOT DETECTED
PH UR STRIP.AUTO: 5.5 [PH] (ref 5–8)
PLATELET # BLD AUTO: 176 10*3/MM3 (ref 140–450)
PMV BLD AUTO: 10.7 FL (ref 6–12)
POTASSIUM SERPL-SCNC: 3.3 MMOL/L (ref 3.5–5.2)
PROT SERPL-MCNC: 6.9 G/DL (ref 6–8.5)
PROT UR QL STRIP: NEGATIVE
RBC # BLD AUTO: 4.58 10*6/MM3 (ref 3.77–5.28)
RVA RNA STL QL NAA+NON-PROBE: DETECTED
S ENT+BONG DNA STL QL NAA+NON-PROBE: NOT DETECTED
SAPO I+II+IV+V RNA STL QL NAA+NON-PROBE: NOT DETECTED
SHIGELLA SP+EIEC IPAH ST NAA+NON-PROBE: NOT DETECTED
SODIUM SERPL-SCNC: 134 MMOL/L (ref 136–145)
SP GR UR STRIP: 1.01 (ref 1–1.03)
UROBILINOGEN UR QL STRIP: NORMAL
V CHOL+PARA+VUL DNA STL QL NAA+NON-PROBE: NOT DETECTED
V CHOLERAE DNA STL QL NAA+NON-PROBE: NOT DETECTED
WBC NRBC COR # BLD: 9.03 10*3/MM3 (ref 3.4–10.8)
WHOLE BLOOD HOLD COAG: NORMAL
WHOLE BLOOD HOLD SPECIMEN: NORMAL
Y ENTEROCOL DNA STL QL NAA+NON-PROBE: NOT DETECTED

## 2023-06-09 PROCEDURE — 81003 URINALYSIS AUTO W/O SCOPE: CPT

## 2023-06-09 PROCEDURE — 83690 ASSAY OF LIPASE: CPT

## 2023-06-09 PROCEDURE — 74177 CT ABD & PELVIS W/CONTRAST: CPT

## 2023-06-09 PROCEDURE — 80053 COMPREHEN METABOLIC PANEL: CPT

## 2023-06-09 PROCEDURE — 85025 COMPLETE CBC W/AUTO DIFF WBC: CPT

## 2023-06-09 PROCEDURE — 25010000002 ONDANSETRON PER 1 MG: Performed by: EMERGENCY MEDICINE

## 2023-06-09 PROCEDURE — 87507 IADNA-DNA/RNA PROBE TQ 12-25: CPT | Performed by: EMERGENCY MEDICINE

## 2023-06-09 PROCEDURE — 83605 ASSAY OF LACTIC ACID: CPT

## 2023-06-09 PROCEDURE — 36415 COLL VENOUS BLD VENIPUNCTURE: CPT

## 2023-06-09 PROCEDURE — 25510000001 IOPAMIDOL 61 % SOLUTION: Performed by: EMERGENCY MEDICINE

## 2023-06-09 PROCEDURE — 99283 EMERGENCY DEPT VISIT LOW MDM: CPT

## 2023-06-09 PROCEDURE — 96374 THER/PROPH/DIAG INJ IV PUSH: CPT

## 2023-06-09 RX ORDER — ONDANSETRON 4 MG/1
4 TABLET, ORALLY DISINTEGRATING ORAL EVERY 8 HOURS PRN
Qty: 10 TABLET | Refills: 0 | Status: SHIPPED | OUTPATIENT
Start: 2023-06-09

## 2023-06-09 RX ORDER — ONDANSETRON 2 MG/ML
4 INJECTION INTRAMUSCULAR; INTRAVENOUS ONCE
Status: COMPLETED | OUTPATIENT
Start: 2023-06-09 | End: 2023-06-09

## 2023-06-09 RX ORDER — SODIUM CHLORIDE 0.9 % (FLUSH) 0.9 %
10 SYRINGE (ML) INJECTION AS NEEDED
Status: DISCONTINUED | OUTPATIENT
Start: 2023-06-09 | End: 2023-06-09 | Stop reason: HOSPADM

## 2023-06-09 RX ADMIN — IOPAMIDOL 100 ML: 612 INJECTION, SOLUTION INTRAVENOUS at 07:49

## 2023-06-09 RX ADMIN — ONDANSETRON 4 MG: 2 INJECTION INTRAMUSCULAR; INTRAVENOUS at 07:26

## 2023-06-09 NOTE — DISCHARGE INSTRUCTIONS
Diarrhea is related to rotavirus infection probably obtained from children.  The symptoms should pass within 1 week.  Make sure you drink plenty of fluids and get plenty of rest.  Please continue your potassium medication as your potassium levels were slightly low.

## 2023-06-09 NOTE — ED PROVIDER NOTES
EMERGENCY DEPARTMENT ENCOUNTER    Room Number:  11/11  Date seen:  6/9/2023  PCP: Javan Martinez MD  Historian: Patient      HPI:  Chief Complaint: Diarrhea, vomiting  A complete HPI/ROS/PMH/PSH/SH/FH are unobtainable due to:   Context: Paz Browne is a 68 y.o. female who presents to the ED c/o diarrhea, vomiting.  Patient had onset of diarrhea over the last 3 days.  Diarrhea has been watery and nonbloody.  Over the last 24 hours she has had some nausea and vomiting.  Vomiting has been bilious at times.  She does report some epigastric pain that is currently mild but was moderate to severe earlier.  Pain was described as sharp.  Denies fever.  Denies chest pain.  No increased shortness of breath from baseline.    Prior abdominal surgeries include hysterectomy, cholecystectomy and CABG.      MEDICAL RECORD REVIEW (non ED)  I reviewed prior medical records including most recent admission in March of this year.  Patient was admitted at that time with COPD other chronic conditions include paroxysmal atrial fibrillation, CHF and chronic back pain.    PAST MEDICAL HISTORY  Active Ambulatory Problems     Diagnosis Date Noted    PAF (paroxysmal atrial fibrillation) 01/25/2016    Hypertension     Hyperlipidemia     Pain medication agreement 05/04/2016    Hiatal hernia 05/04/2016    Primary osteoarthritis involving multiple joints 05/04/2016    Pulmonary hypertension     S/P TVR (tricuspid valve repair) 07/07/2016    Pulmonary nodule 10/13/2016    Restless leg syndrome 11/18/2016    Chronic low back pain 08/02/2017    Dysplastic polyp of colon 08/07/2017    History of mitral valve replacement with tissue graft 08/11/2017    Chronic hypoxemic respiratory failure 08/13/2017    Anemia 08/09/2017    Celiac artery stenosis 08/24/2017    Intertrigo 08/25/2017    Abnormal EKG 06/30/2019    Muscle spasms of both lower extremities 12/22/2020    Iron deficiency anemia 03/30/2021    Adenomatous polyp of colon 03/30/2021     Gastroesophageal reflux disease without esophagitis 03/30/2021    Headache 07/04/2021    History of endocarditis 02/03/2022    Pneumonia of both lower lobes due to infectious organism 10/16/2022    Peptic ulcer disease 10/17/2022    Peripheral vascular disease 10/17/2022    Hyperlipidemia 10/17/2022    Hyperglycemia 10/17/2022    COPD with acute exacerbation 10/19/2022    Chronic diastolic CHF (congestive heart failure) 03/29/2023    Chronic bilateral low back pain 04/19/2023     Resolved Ambulatory Problems     Diagnosis Date Noted    Tachycardia     Renal failure     Palpitations     Mitral regurgitation     Heart failure 06/08/2016    Long term current use of anticoagulant 10/13/2016    Aspiration pneumonia 10/14/2016    Hemoptysis 10/14/2016    Lower GI bleed 08/09/2017    Precordial pain 08/11/2017    Oral candidiasis 08/14/2017    VAN (acute kidney injury) 08/15/2017    GI bleed 12/01/2017    Acute exacerbation of chronic obstructive pulmonary disease (COPD) 01/02/2018    Pneumonia due to infectious organism 07/18/2018    Insomnia due to medical condition 02/01/2019    COPD (chronic obstructive pulmonary disease) 06/30/2019    Shingles 04/08/2020    Vertigo 10/06/2020    Dark stools 03/30/2021    Acute hyperkalemia 05/10/2021    Tremor 05/10/2021    Anemia 05/10/2021    COPD exacerbation (HCC) 06/14/2021    Septicemia (HCC) 07/02/2021    Bacteremia 07/03/2021    Acute hypoxemic respiratory failure 10/17/2022    COPD (chronic obstructive pulmonary disease) 10/17/2022    Pulmonary nodule 10/17/2022    Hypokalemia 10/17/2022    Chronic respiratory failure 10/19/2022     Past Medical History:   Diagnosis Date    Asthma     Atrial flutter     Cataract     Chronic respiratory failure with hypoxia     Colon polyp     History of CHF (congestive heart failure)     History of home oxygen therapy     Infectious viral hepatitis     Long term (current) use of anticoagulants     Pneumonia          PAST SURGICAL  HISTORY  Past Surgical History:   Procedure Laterality Date    CARDIAC CATHETERIZATION  09/01/2014    Right dominant systemt, normal coronary arteries.     CARDIAC CATHETERIZATION Left 6/10/2016    Procedure: Cardiac catheterization;  Surgeon: Sergei Hall MD;  Location: [facility] CATH INVASIVE LOCATION;  Service:     CARDIAC CATHETERIZATION N/A 6/10/2016    Procedure: Right Heart Cath;  Surgeon: Sergei Hall MD;  Location: [facility] CATH INVASIVE LOCATION;  Service:     CATARACT EXTRACTION      COLONOSCOPY      COLONOSCOPY N/A 8/4/2017    Procedure: COLONOSCOPY TO CECUM/TI WITH POLYPECTOMY ( COLD BX);  Surgeon: Cleveland Devine MD;  Location: [facility] ENDOSCOPY;  Service:     COLONOSCOPY N/A 8/10/2017    Procedure: COLONOSCOPY to cecum and TI with 2 clips placed at transverse;  Surgeon: Earnest PALOMO MD;  Location: [facility] ENDOSCOPY;  Service:     COLONOSCOPY N/A 12/22/2017    Procedure: COLONOSCOPY INTO CECUM WITH COLD POLYPECTOMIES;  Surgeon: Cleveland Devine MD;  Location: [facility] ENDOSCOPY;  Service:     COLONOSCOPY N/A 5/17/2021    Procedure: COLONOSCOPY to cecum;  Surgeon: Cleveland Devine MD;  Location: [facility] ENDOSCOPY;  Service: Gastroenterology;  Laterality: N/A;  Pre: Fe deficency anemia, h/x of polyps  Post: fair prep, normal    ENDOSCOPY N/A 8/17/2017    Procedure: ESOPHAGOGASTRODUODENOSCOPY;  Surgeon: Porsha Ruby MD;  Location: [facility] ENDOSCOPY;  Service:     ENDOSCOPY N/A 12/22/2017    Procedure: ESOPHAGOGASTRODUODENOSCOPY WITH BIOPSIES;  Surgeon: Cleveland Devine MD;  Location: [facility] ENDOSCOPY;  Service:     ENDOSCOPY N/A 5/17/2021    Procedure: ESOPHAGOGASTRODUODENOSCOPY  with biopsies;  Surgeon: Cleveland Devine MD;  Location: [facility] ENDOSCOPY;  Service: Gastroenterology;  Laterality: N/A;  Pre: Fe deficency anemia, nausea, heme positive stool   Post: gastritis, sloughing of distal esophagus mucosa    GALLBLADDER SURGERY      HEMORRHOIDECTOMY      HYSTERECTOMY      KIDNEY SURGERY  04/22/2013    Stent placement     MAZE PROCEDURE      MITRAL VALVE REPLACEMENT      TONSILLECTOMY      TRICUSPID VALVE REPLACEMENT           FAMILY HISTORY  Family History   Adopted: Yes   Problem Relation Age of Onset    No Known Problems Mother     No Known Problems Father          SOCIAL HISTORY  Social History     Socioeconomic History    Marital status:     Number of children: 2   Tobacco Use    Smoking status: Former     Packs/day: 3.00     Years: 54.00     Pack years: 162.00     Types: Cigarettes     Quit date:      Years since quittin.4     Passive exposure: Past    Smokeless tobacco: Never    Tobacco comments:     caffeine - tea or mt dew    Vaping Use    Vaping Use: Never used   Substance and Sexual Activity    Alcohol use: No    Drug use: No    Sexual activity: Defer         ALLERGIES  Bupropion, Cephalexin, and Metoprolol        REVIEW OF SYSTEMS  Review of Systems   Constitutional:  Negative for fever.   Respiratory:  Positive for shortness of breath (Chronic, unchanged baseline).    Cardiovascular:  Negative for chest pain.   Gastrointestinal:  Positive for abdominal pain, diarrhea, nausea and vomiting.   All other systems reviewed and are negative.         PHYSICAL EXAM  ED Triage Vitals   Temp Heart Rate Resp BP SpO2   23 0417 23 0417 23 0417 23 0419 23 0417   98.3 °F (36.8 °C) 72 16 158/72 94 %      Temp src Heart Rate Source Patient Position BP Location FiO2 (%)   23 0417 23 0417 23 0419 23 0419 --   Tympanic Monitor Sitting Left arm        Physical Exam    GENERAL: Alert and well-appearing in no obvious distress.  Triage vitals reviewed and are fairly benign.  HENT: nares patent  EYES: no scleral icterus  CV: regular rhythm, regular rate  RESPIRATORY: normal effort, slightly decreased breath sound bilaterally-O2 sats mid 90s on 2 L nasal cannula  ABDOMEN: soft, mild epigastric tenderness without rebound or guarding  MUSCULOSKELETAL: no deformity  NEURO: Strength  sensation and coordination are grossly intact.  Speech and mentation are unremarkable  SKIN: warm, dry      Vital signs and nursing notes reviewed.          LAB RESULTS  Recent Results (from the past 24 hour(s))   Comprehensive Metabolic Panel    Collection Time: 06/09/23  4:45 AM    Specimen: Arm, Right; Blood   Result Value Ref Range    Glucose 114 (H) 65 - 99 mg/dL    BUN 13 8 - 23 mg/dL    Creatinine 1.07 (H) 0.57 - 1.00 mg/dL    Sodium 134 (L) 136 - 145 mmol/L    Potassium 3.3 (L) 3.5 - 5.2 mmol/L    Chloride 93 (L) 98 - 107 mmol/L    CO2 28.8 22.0 - 29.0 mmol/L    Calcium 9.6 8.6 - 10.5 mg/dL    Total Protein 6.9 6.0 - 8.5 g/dL    Albumin 4.2 3.5 - 5.2 g/dL    ALT (SGPT) 50 (H) 1 - 33 U/L    AST (SGOT) 57 (H) 1 - 32 U/L    Alkaline Phosphatase 110 39 - 117 U/L    Total Bilirubin 1.0 0.0 - 1.2 mg/dL    Globulin 2.7 gm/dL    A/G Ratio 1.6 g/dL    BUN/Creatinine Ratio 12.1 7.0 - 25.0    Anion Gap 12.2 5.0 - 15.0 mmol/L    eGFR 56.0 (L) >60.0 mL/min/1.73   Lipase    Collection Time: 06/09/23  4:45 AM    Specimen: Arm, Right; Blood   Result Value Ref Range    Lipase 30 13 - 60 U/L   Lactic Acid, Plasma    Collection Time: 06/09/23  4:45 AM    Specimen: Arm, Right; Blood   Result Value Ref Range    Lactate 0.8 0.5 - 2.0 mmol/L   Green Top (Gel)    Collection Time: 06/09/23  4:45 AM   Result Value Ref Range    Extra Tube Hold for add-ons.    Lavender Top    Collection Time: 06/09/23  4:45 AM   Result Value Ref Range    Extra Tube hold for add-on    Gold Top - SST    Collection Time: 06/09/23  4:45 AM   Result Value Ref Range    Extra Tube Hold for add-ons.    Light Blue Top    Collection Time: 06/09/23  4:45 AM   Result Value Ref Range    Extra Tube Hold for add-ons.    CBC Auto Differential    Collection Time: 06/09/23  4:45 AM    Specimen: Arm, Right; Blood   Result Value Ref Range    WBC 9.03 3.40 - 10.80 10*3/mm3    RBC 4.58 3.77 - 5.28 10*6/mm3    Hemoglobin 13.4 12.0 - 15.9 g/dL    Hematocrit 38.3 34.0 - 46.6 %     MCV 83.6 79.0 - 97.0 fL    MCH 29.3 26.6 - 33.0 pg    MCHC 35.0 31.5 - 35.7 g/dL    RDW 12.7 12.3 - 15.4 %    RDW-SD 38.4 37.0 - 54.0 fl    MPV 10.7 6.0 - 12.0 fL    Platelets 176 140 - 450 10*3/mm3    Neutrophil % 84.9 (H) 42.7 - 76.0 %    Lymphocyte % 7.9 (L) 19.6 - 45.3 %    Monocyte % 3.9 (L) 5.0 - 12.0 %    Eosinophil % 1.6 0.3 - 6.2 %    Basophil % 0.6 0.0 - 1.5 %    Immature Grans % 1.1 (H) 0.0 - 0.5 %    Neutrophils, Absolute 7.68 (H) 1.70 - 7.00 10*3/mm3    Lymphocytes, Absolute 0.71 0.70 - 3.10 10*3/mm3    Monocytes, Absolute 0.35 0.10 - 0.90 10*3/mm3    Eosinophils, Absolute 0.14 0.00 - 0.40 10*3/mm3    Basophils, Absolute 0.05 0.00 - 0.20 10*3/mm3    Immature Grans, Absolute 0.10 (H) 0.00 - 0.05 10*3/mm3    nRBC 0.0 0.0 - 0.2 /100 WBC   Urinalysis With Microscopic If Indicated (No Culture) - Urine, Clean Catch    Collection Time: 06/09/23  6:28 AM    Specimen: Urine, Clean Catch   Result Value Ref Range    Color, UA Yellow Yellow, Straw    Appearance, UA Clear Clear    pH, UA 5.5 5.0 - 8.0    Specific Gravity, UA 1.015 1.005 - 1.030    Glucose, UA Negative Negative    Ketones, UA Negative Negative    Bilirubin, UA Negative Negative    Blood, UA Negative Negative    Protein, UA Negative Negative    Leuk Esterase, UA Negative Negative    Nitrite, UA Negative Negative    Urobilinogen, UA 0.2 E.U./dL 0.2 - 1.0 E.U./dL   Gastrointestinal Panel, PCR - Stool, Per Rectum    Collection Time: 06/09/23  6:50 AM    Specimen: Per Rectum; Stool   Result Value Ref Range    Campylobacter Not Detected Not Detected    Plesiomonas shigelloides Not Detected Not Detected    Salmonella Not Detected Not Detected    Vibrio Not Detected Not Detected    Vibrio cholerae Not Detected Not Detected    Yersinia enterocolitica Not Detected Not Detected    Enteroaggregative E. coli (EAEC) Not Detected Not Detected    Enteropathogenic E. coli (EPEC) Not Detected Not Detected    Enterotoxigenic E. coli (ETEC) lt/st Not Detected Not  Detected    Shiga-like toxin-producing E. coli (STEC) stx1/stx2 Not Detected Not Detected    Shigella/Enteroinvasive E. coli (EIEC) Not Detected Not Detected    Cryptosporidium Not Detected Not Detected    Cyclospora cayetanensis Not Detected Not Detected    Entamoeba histolytica Not Detected Not Detected    Giardia lamblia Not Detected Not Detected    Adenovirus F40/41 Not Detected Not Detected    Astrovirus Not Detected Not Detected    Norovirus GI/GII Not Detected Not Detected    Rotavirus A Detected (A) Not Detected    Sapovirus (I, II, IV or V) Not Detected Not Detected       Ordered the above labs and independently interpreted results. My findings will be discussed in the medical decision making section below        RADIOLOGY  CT Abdomen Pelvis With Contrast    Result Date: 6/9/2023  CT ABDOMEN AND PELVIS WITH CONTRAST  HISTORY: Abdominal pain and vomiting.  TECHNIQUE: Axial CT images of the abdomen and pelvis were obtained following administration of intravenous contrast. The patient was not given oral contrast Coronal and sagittal reformats were obtained.  COMPARISON: 07/02/2019  FINDINGS: There is mild bilobar intrahepatic biliary dilatation and dilated common bile duct to the level of the ampulla. The patient is status post cholecystectomy. The intrahepatic biliary dilatation has slightly progressed in the interim from prior imaging in 2017 and correlation with alkaline phosphatase is recommended. There is also dilatation of the main pancreatic duct within the head of the pancreas that is new from prior imaging. The pancreatic duct in the neck region measures up to 7 mm. A low-density area in the uncinate process is new measuring up to 1.2 cm. No suspicious focal hepatic lesions are seen. The spleen shows punctate calcified granulomas. Small sliding hiatal hernia is seen. Bilateral adrenal glands are normal. Both kidneys are normal in size and attenuation. Small cortical cyst is seen within the medial  aspect of the right kidney. The urinary bladder is partially distended with herniation along the right inferior pelvic wall. No evidence of bowel obstruction. Increased fluid is seen within the small and large bowel loops and this may suggest the presence of diarrhea. No appreciable colonic wall thickening. Marked calcified atherosclerotic plaque is seen within the abdominal aorta and its branches. Moderate to severe narrowing at the origin of bilateral common iliac artery secondary to calcified plaque. Small hiatal hernia is seen. Marked emphysematous changes identified within the visualized lower lung fields. No pleural or pericardial effusion.      1. Mild intrahepatic and extrahepatic biliary dilatation that has increased in the interim from prior imaging. There is also dilatation of the proximal pancreatic duct that is new from prior imaging. A low-density lesion is also newly demonstrated within the uncinate process. Correlation with alkaline phosphatase is recommended. These findings could be further evaluated with an MRCP that may be performed on a nonemergent basis if alkaline phosphatase and bilirubin is normal at the current time. 2. Increased fluid content within the bowel may suggest presence of diarrhea.  Radiation dose reduction techniques were utilized, including automated exposure control and exposure modulation based on body size.        I ordered and independently reviewed the above noted radiographic studies.      I viewed images of CT abdomen which showed no obvious perforation or obstruction per my independent interpretation.    See radiologist's dictation for official interpretation.             PROCEDURES  Procedures          MEDICATIONS GIVEN IN ER  Medications   sodium chloride 0.9 % flush 10 mL (has no administration in time range)   ondansetron (ZOFRAN) injection 4 mg (4 mg Intravenous Given 6/9/23 0726)   iopamidol (ISOVUE-300) 61 % injection 100 mL (100 mL Intravenous Given by Other  6/9/23 0749)               MEDICAL DECISION MAKING, PROGRESS, and CONSULTS    All labs have been independently reviewed by me.  All radiology studies have been reviewed by me and I have also reviewed the radiology report.   EKG's independently viewed and interpreted by me.  Discussion below represents my analysis of pertinent findings related to patient's condition, differential diagnosis, treatment plan and final disposition.      Additional sources:  - Discussed/ obtained information from independent historians:  after initial arrival    - External (non-ED) record review: Please see documented above    - Chronic or social conditions impacting care: COPD, A-fib, CHF    - Shared decision making: I discussed ED evaluation and treatment plan with patient who is in agreement.  Complicated patient with multiple comorbidities presents with diarrhea progressing to nausea as well as some upper abdominal pain.    Exam relatively benign without fever or significant tenderness to palpation.  Patient does not have recurrent vomiting or diarrhea here in the ED.  She was able to give us a stool sample which was positive for rotavirus infection.  CT scan benign other than some biliary ductal dilation of doubtful acute clinical significance.    We will treat patient conservatively and encourage plenty of p.o. fluids.  Should resolve on its own.  I did discussion her hypokalemia.  Patient does take potassium at home but missed doses yesterday.  Hypokalemia likely related to GI losses from diarrhea.  We will ask her to eat bananas or orange juice.  She will need to resume her oral potassium supplementation.      Orders placed during this visit:  Orders Placed This Encounter   Procedures    Gastrointestinal Panel, PCR - Stool, Per Rectum    CT Abdomen Pelvis With Contrast    [facility] Draw    Comprehensive Metabolic Panel    Lipase    Urinalysis With Microscopic If Indicated (No Culture) - Urine, Clean Catch    Lactic Acid,  Plasma    CBC Auto Differential    NPO Diet NPO Type: Strict NPO    Undress & Gown    Insert Peripheral IV    CBC & Differential    Green Top (Gel)    Lavender Top    Gold Top - SST    Light Blue Top           Differential diagnosis:    Please see as documented below in ED course      Independent interpretation of labs, radiology studies, and discussions with consultants:  ED Course as of 06/09/23 0918   Fri Jun 09, 2023   0718 [id number]old female with multiple medical problems including COPD, A-fib and chronic back pain presents with diarrhea progressing to nausea with some epigastric pain.    On exam patient is well-appearing with benign vitals.  She does have some mild epigastric tenderness to palpation without rebound or guarding.    Assessment-etiology of GI symptoms is unclear.  Certainly would consider foodborne illness or gastroenteritis is common causes.  Would consider dehydration or electrolyte disturbance.    More serious causes could include infection, obstruction or malignancy.    Labs reviewed are fairly benign without significant dehydration or major electrolyte disturbance.  There is some mild hypokalemia with potassium of 3.3.    CBC and lactic acid are normal which would lessen concern for serious bacterial illness.    Given patient's age, comorbidities and epigastric pain we will get CT scan to rule out serious causes of abdominal pain including obstruction or perforation.    We will go ahead and treat with some IV Zofran for nausea. [DB]   0720 CT scan of the abdomen independently interpreted by me shows no obvious perforation or obstruction. [DB]   0856 Official interpretation is CT scan shows some increased biliary ductal dilation compared to prior.  There is also appreciated liquid stool consistent with likely diarrheal illness. [DB]   0900 Stool PCR is positive for rotavirus.    Ultimately given patient's exam and testing suspect we are dealing with diarrheal illness secondary to  rotavirus.  Although there is some biliary intraductal dilation these are nonspecific symptoms and do not think that it is clinically relevant or needs extensive work-up.  She is not having right upper quadrant pain and does have benign LFTs.    Suspect that patient can be treated outpatient with supportive care.  Generally rotavirus will resolve on its own. [DB]   0910 I went back to reassess patient discussed test.  She is well-appearing and has not had significant vomiting or diarrhea here in the ED.    At this point labs look benign and CT does not show serious bacterial illness or obstruction.  Will discharge home with supportive care. [DB]      ED Course User Index  [DB] Fam Sosa MD             I used full protective equipment while examining this patient.  This includes face mask, gloves and protective eyewear.  I washed my hands before entering the room and immediately upon leaving the room    DIAGNOSIS  Final diagnoses:   Infectious diarrhea   Hypokalemia   Dilation of common bile duct   Chronic diastolic CHF (congestive heart failure)   PAF (paroxysmal atrial fibrillation)         DISPOSITION  DISCHARGE    Patient discharged in stable condition.    Reviewed implications of results, diagnosis, meds, responsibility to follow up, warning signs and symptoms of possible worsening, potential complications and reasons to return to ER, including worsening symptoms or as needed.    Patient/Family voiced understanding of above instructions.    Discussed plan for discharge, as there is no emergent indication for admission. Patient referred to primary care provider for BP management due to today's BP. Pt/family is agreeable and understands need for follow up and repeat testing.  Pt is aware that discharge does not mean that nothing is wrong but it indicates no emergency is present that requires admission and they must continue care with follow-up as given below or physician of their choice.     FOLLOW-UP  No  follow-up provider specified.       Medication List        New Prescriptions      ondansetron ODT 4 MG disintegrating tablet  Commonly known as: ZOFRAN-ODT  Place 1 tablet on the tongue Every 8 (Eight) Hours As Needed for Nausea or Vomiting.               Where to Get Your Medications        These medications were sent to [facility]/pharmacy #[id number] - [place], [place] - [address] AT [facility] - [phone]  - [phone]   [address] [facility] [address]      Phone: [phone]   ondansetron ODT 4 MG disintegrating tablet                   Latest Documented Vital Signs:  As of 09:18 EDT  BP- 143/79 HR- 75 Temp- 98.3 °F (36.8 °C) (Tympanic) O2 sat- 95%              --    Please note that portions of this were completed with a voice recognition program.       Note Disclaimer: At [facility], we believe that sharing information builds trust and better relationships. You are receiving this note because you are receiving care at [facility] or recently visited. It is possible you will see health information before a provider has talked with you about it. This kind of information can be easy to misunderstand. To help you fully understand what it means for your health, we urge you to discuss this note with your provider.

## 2023-08-02 ENCOUNTER — HOSPITAL ENCOUNTER (EMERGENCY)
Facility: HOSPITAL | Age: 68
Discharge: HOME | End: 2023-08-02
Attending: EMERGENCY MEDICINE
Payer: MEDICARE

## 2023-08-02 ENCOUNTER — APPOINTMENT (OUTPATIENT)
Dept: GENERAL RADIOLOGY | Facility: HOSPITAL | Age: 68
End: 2023-08-02
Payer: MEDICARE

## 2023-08-02 VITALS
WEIGHT: 125 LBS | OXYGEN SATURATION: 97 % | DIASTOLIC BLOOD PRESSURE: 86 MMHG | HEIGHT: 67 IN | RESPIRATION RATE: 20 BRPM | SYSTOLIC BLOOD PRESSURE: 142 MMHG | TEMPERATURE: 98.9 F | HEART RATE: 78 BPM | BODY MASS INDEX: 19.62 KG/M2

## 2023-08-02 DIAGNOSIS — J44.1 COPD EXACERBATION: Primary | ICD-10-CM

## 2023-08-02 DIAGNOSIS — Z86.79 HISTORY OF CHF (CONGESTIVE HEART FAILURE): ICD-10-CM

## 2023-08-02 DIAGNOSIS — Z86.79 HISTORY OF HYPERTENSION: ICD-10-CM

## 2023-08-02 DIAGNOSIS — Z86.79 HISTORY OF ATRIAL FIBRILLATION: ICD-10-CM

## 2023-08-02 LAB
ALBUMIN SERPL-MCNC: 4.7 G/DL (ref 3.5–5.2)
ALBUMIN/GLOB SERPL: 1.7 G/DL
ALP SERPL-CCNC: 112 U/L (ref 39–117)
ALT SERPL W P-5'-P-CCNC: 13 U/L (ref 1–33)
ANION GAP SERPL CALCULATED.3IONS-SCNC: 12 MMOL/L (ref 5–15)
AST SERPL-CCNC: 20 U/L (ref 1–32)
BASOPHILS # BLD AUTO: 0.03 10*3/MM3 (ref 0–0.2)
BASOPHILS NFR BLD AUTO: 0.3 % (ref 0–1.5)
BILIRUB SERPL-MCNC: 1 MG/DL (ref 0–1.2)
BUN SERPL-MCNC: 8 MG/DL (ref 8–23)
BUN/CREAT SERPL: 9.4 (ref 7–25)
CALCIUM SPEC-SCNC: 10.2 MG/DL (ref 8.6–10.5)
CHLORIDE SERPL-SCNC: 99 MMOL/L (ref 98–107)
CO2 SERPL-SCNC: 31 MMOL/L (ref 22–29)
CREAT SERPL-MCNC: 0.85 MG/DL (ref 0.57–1)
DEPRECATED RDW RBC AUTO: 38.8 FL (ref 37–54)
EGFRCR SERPLBLD CKD-EPI 2021: 73.8 ML/MIN/1.73
EOSINOPHIL # BLD AUTO: 0.08 10*3/MM3 (ref 0–0.4)
EOSINOPHIL NFR BLD AUTO: 0.7 % (ref 0.3–6.2)
ERYTHROCYTE [DISTWIDTH] IN BLOOD BY AUTOMATED COUNT: 12.8 % (ref 12.3–15.4)
GEN 5 2HR TROPONIN T REFLEX: 18 NG/L
GLOBULIN UR ELPH-MCNC: 2.8 GM/DL
GLUCOSE SERPL-MCNC: 136 MG/DL (ref 65–99)
HCT VFR BLD AUTO: 37.8 % (ref 34–46.6)
HGB BLD-MCNC: 12.6 G/DL (ref 12–15.9)
HOLD SPECIMEN: NORMAL
HOLD SPECIMEN: NORMAL
IMM GRANULOCYTES # BLD AUTO: 0.07 10*3/MM3 (ref 0–0.05)
IMM GRANULOCYTES NFR BLD AUTO: 0.6 % (ref 0–0.5)
LYMPHOCYTES # BLD AUTO: 1.28 10*3/MM3 (ref 0.7–3.1)
LYMPHOCYTES NFR BLD AUTO: 10.7 % (ref 19.6–45.3)
MCH RBC QN AUTO: 28.3 PG (ref 26.6–33)
MCHC RBC AUTO-ENTMCNC: 33.3 G/DL (ref 31.5–35.7)
MCV RBC AUTO: 84.9 FL (ref 79–97)
MONOCYTES # BLD AUTO: 0.46 10*3/MM3 (ref 0.1–0.9)
MONOCYTES NFR BLD AUTO: 3.8 % (ref 5–12)
NEUTROPHILS NFR BLD AUTO: 10.08 10*3/MM3 (ref 1.7–7)
NEUTROPHILS NFR BLD AUTO: 83.9 % (ref 42.7–76)
NRBC BLD AUTO-RTO: 0 /100 WBC (ref 0–0.2)
NT-PROBNP SERPL-MCNC: 1348 PG/ML (ref 0–900)
PLATELET # BLD AUTO: 214 10*3/MM3 (ref 140–450)
PMV BLD AUTO: 10.8 FL (ref 6–12)
POTASSIUM SERPL-SCNC: 3.8 MMOL/L (ref 3.5–5.2)
PROT SERPL-MCNC: 7.5 G/DL (ref 6–8.5)
QT INTERVAL: 425 MS
RBC # BLD AUTO: 4.45 10*6/MM3 (ref 3.77–5.28)
SODIUM SERPL-SCNC: 142 MMOL/L (ref 136–145)
TROPONIN T DELTA: 0 NG/L
TROPONIN T SERPL HS-MCNC: 18 NG/L
WBC NRBC COR # BLD: 12 10*3/MM3 (ref 3.4–10.8)
WHOLE BLOOD HOLD COAG: NORMAL
WHOLE BLOOD HOLD SPECIMEN: NORMAL

## 2023-08-02 PROCEDURE — 94640 AIRWAY INHALATION TREATMENT: CPT

## 2023-08-02 PROCEDURE — 96374 THER/PROPH/DIAG INJ IV PUSH: CPT

## 2023-08-02 PROCEDURE — 99284 EMERGENCY DEPT VISIT MOD MDM: CPT

## 2023-08-02 PROCEDURE — 83880 ASSAY OF NATRIURETIC PEPTIDE: CPT | Performed by: EMERGENCY MEDICINE

## 2023-08-02 PROCEDURE — 94761 N-INVAS EAR/PLS OXIMETRY MLT: CPT

## 2023-08-02 PROCEDURE — 94799 UNLISTED PULMONARY SVC/PX: CPT

## 2023-08-02 PROCEDURE — 85025 COMPLETE CBC W/AUTO DIFF WBC: CPT | Performed by: EMERGENCY MEDICINE

## 2023-08-02 PROCEDURE — 71045 X-RAY EXAM CHEST 1 VIEW: CPT

## 2023-08-02 PROCEDURE — 84484 ASSAY OF TROPONIN QUANT: CPT | Performed by: EMERGENCY MEDICINE

## 2023-08-02 PROCEDURE — 25010000002 METHYLPREDNISOLONE PER 125 MG: Performed by: EMERGENCY MEDICINE

## 2023-08-02 PROCEDURE — 93005 ELECTROCARDIOGRAM TRACING: CPT | Performed by: EMERGENCY MEDICINE

## 2023-08-02 PROCEDURE — 80053 COMPREHEN METABOLIC PANEL: CPT | Performed by: EMERGENCY MEDICINE

## 2023-08-02 PROCEDURE — 36415 COLL VENOUS BLD VENIPUNCTURE: CPT

## 2023-08-02 RX ORDER — METHYLPREDNISOLONE SODIUM SUCCINATE 125 MG/2ML
125 INJECTION, POWDER, LYOPHILIZED, FOR SOLUTION INTRAMUSCULAR; INTRAVENOUS ONCE
Status: COMPLETED | OUTPATIENT
Start: 2023-08-02 | End: 2023-08-02

## 2023-08-02 RX ORDER — PREDNISONE 20 MG/1
40 TABLET ORAL DAILY
Qty: 8 TABLET | Refills: 0 | Status: SHIPPED | OUTPATIENT
Start: 2023-08-02 | End: 2023-08-06

## 2023-08-02 RX ORDER — IPRATROPIUM BROMIDE AND ALBUTEROL SULFATE 2.5; .5 MG/3ML; MG/3ML
3 SOLUTION RESPIRATORY (INHALATION) ONCE
Status: COMPLETED | OUTPATIENT
Start: 2023-08-02 | End: 2023-08-02

## 2023-08-02 RX ORDER — SODIUM CHLORIDE 0.9 % (FLUSH) 0.9 %
10 SYRINGE (ML) INJECTION AS NEEDED
Status: DISCONTINUED | OUTPATIENT
Start: 2023-08-02 | End: 2023-08-02 | Stop reason: HOSPADM

## 2023-08-02 RX ORDER — ASPIRIN 325 MG
325 TABLET ORAL ONCE
Status: DISCONTINUED | OUTPATIENT
Start: 2023-08-02 | End: 2023-08-02 | Stop reason: HOSPADM

## 2023-08-02 RX ADMIN — IPRATROPIUM BROMIDE AND ALBUTEROL SULFATE 3 ML: 2.5; .5 SOLUTION RESPIRATORY (INHALATION) at 09:36

## 2023-08-02 RX ADMIN — METHYLPREDNISOLONE SODIUM SUCCINATE 125 MG: 125 INJECTION, POWDER, FOR SOLUTION INTRAMUSCULAR; INTRAVENOUS at 09:27

## 2023-08-02 NOTE — ED NOTES
Pt to ED c/o chest pain. Hx of emphysema and COPD. States that she usually gets a prednisone shot. Pt uses 2L NC at baseline.

## 2023-08-02 NOTE — ED PROVIDER NOTES
EMERGENCY DEPARTMENT ENCOUNTER    Room Number:  14/14  PCP: Javan Martinez MD  Historian: Patient      HPI:  Chief Complaint: Chest tightness, shortness of breath.  A complete HPI/ROS/PMH/PSH/SH/FH are unobtainable due to: None    Context: Paz Browne is a 68 y.o. female who presents to the ED via private vehicle for evaluation for 2 to 3 days of increasing chest tightness and shortness of breath.  Has a history of COPD on chronic O2.  Was placed on antibiotics within the last day or 2 by her primary care provider.  Has had a productive cough.  Borderline temperature elevations to 99.  No vomiting or diarrhea.  Has been compliant with medications.  States that she typically needs some steroid to get through these types of episodes.      MEDICAL RECORD REVIEW    External (non-ED) record review: Adult transthoracic echo February 16, 2022 shows an ejection fraction of 54%    PAST MEDICAL HISTORY  Active Ambulatory Problems     Diagnosis Date Noted    PAF (paroxysmal atrial fibrillation) 01/25/2016    Hypertension     Hyperlipidemia     Pain medication agreement 05/04/2016    Hiatal hernia 05/04/2016    Primary osteoarthritis involving multiple joints 05/04/2016    Pulmonary hypertension     S/P TVR (tricuspid valve repair) 07/07/2016    Pulmonary nodule 10/13/2016    Restless leg syndrome 11/18/2016    Chronic low back pain 08/02/2017    Dysplastic polyp of colon 08/07/2017    History of mitral valve replacement with tissue graft 08/11/2017    Chronic hypoxemic respiratory failure 08/13/2017    Anemia 08/09/2017    Celiac artery stenosis 08/24/2017    Intertrigo 08/25/2017    Abnormal EKG 06/30/2019    Muscle spasms of both lower extremities 12/22/2020    Iron deficiency anemia 03/30/2021    Adenomatous polyp of colon 03/30/2021    Gastroesophageal reflux disease without esophagitis 03/30/2021    Headache 07/04/2021    History of endocarditis 02/03/2022    Pneumonia of both lower lobes  due to infectious organism 10/16/2022    Peptic ulcer disease 10/17/2022    Peripheral vascular disease 10/17/2022    Hyperlipidemia 10/17/2022    Hyperglycemia 10/17/2022    COPD with acute exacerbation 10/19/2022    Chronic diastolic CHF (congestive heart failure) 03/29/2023    Chronic bilateral low back pain 04/19/2023     Resolved Ambulatory Problems     Diagnosis Date Noted    Tachycardia     Renal failure     Palpitations     Mitral regurgitation     Heart failure 06/08/2016    Long term current use of anticoagulant 10/13/2016    Aspiration pneumonia 10/14/2016    Hemoptysis 10/14/2016    Lower GI bleed 08/09/2017    Precordial pain 08/11/2017    Oral candidiasis 08/14/2017    VAN (acute kidney injury) 08/15/2017    GI bleed 12/01/2017    Acute exacerbation of chronic obstructive pulmonary disease (COPD) 01/02/2018    Pneumonia due to infectious organism 07/18/2018    Insomnia due to medical condition 02/01/2019    COPD (chronic obstructive pulmonary disease) 06/30/2019    Shingles 04/08/2020    Vertigo 10/06/2020    Dark stools 03/30/2021    Acute hyperkalemia 05/10/2021    Tremor 05/10/2021    Anemia 05/10/2021    COPD exacerbation (HCC) 06/14/2021    Septicemia (HCC) 07/02/2021    Bacteremia 07/03/2021    Acute hypoxemic respiratory failure 10/17/2022    COPD (chronic obstructive pulmonary disease) 10/17/2022    Pulmonary nodule 10/17/2022    Hypokalemia 10/17/2022    Chronic respiratory failure 10/19/2022     Past Medical History:   Diagnosis Date    Asthma     Atrial flutter     Cataract     Chronic respiratory failure with hypoxia     Colon polyp     History of CHF (congestive heart failure)     History of home oxygen therapy     Infectious viral hepatitis     Long term (current) use of anticoagulants     Pneumonia          PAST SURGICAL HISTORY  Past Surgical History:   Procedure Laterality Date    CARDIAC CATHETERIZATION  09/01/2014    Right dominant  systemt, normal coronary arteries.     CARDIAC CATHETERIZATION Left 6/10/2016    Procedure: Cardiac catheterization;  Surgeon: Sergei Hall MD;  Location: [facility] CATH INVASIVE LOCATION;  Service:     CARDIAC CATHETERIZATION N/A 6/10/2016    Procedure: Right Heart Cath;  Surgeon: Sergei Hall MD;  Location: [facility] CATH INVASIVE LOCATION;  Service:     CATARACT EXTRACTION      COLONOSCOPY      COLONOSCOPY N/A 8/4/2017    Procedure: COLONOSCOPY TO CECUM/TI WITH POLYPECTOMY ( COLD BX);  Surgeon: Cleveland Devine MD;  [facility] END[facility];  Service:     COLONOSCOPY N/A 8/10/2017    Procedure: COLONOSCOPY to cecum and TI with 2 clips placed at transverse;  Surgeon: Earnest PALOMO MD;  [facility] ENDOSCOPY;  Service:     COLONOSCOPY N/A 12/22/2017    Procedure: COLONOSCOPY INTO CECUM WITH COLD POLYPECTOMIES;  Surgeon: Cleveland Devine MD;  Location: [facility] ENDOSCOPY;  Service:     COLONOSCOPY N/A 5/17/2021    Procedure: COLONOSCOPY to cecum;  Surgeon: Cleveland Devine MD;  Location: [facility] ENDOSCOPY;  Service: Gastroenterology;  Laterality: N/A;  Pre: Fe deficency anemia, h/x of polyps  Post: fair prep, normal    ENDOSCOPY N/A 8/17/2017    Procedure: ESOPHAGOGASTRODUODENOSCOPY;  Surgeon: Porsha Ruby MD;  Location: [facility] ENDOSCOPY;  Service:     ENDOSCOPY N/A 12/22/2017    Procedure: ESOPHAGOGASTRODUODENOSCOPY WITH BIOPSIES;  Surgeon: Cleveland Devine MD;  Location: [facility] ENDOSCOPY;  Service:     ENDOSCOPY N/A 5/17/2021    Procedure: ESOPHAGOGASTRODUODENOSCOPY  with biopsies;  Surgeon: Cleveland Devine MD;  Location: [facility] ENDOSCOPY;  Service: Gastroenterology;  Laterality: N/A;  Pre: Fe deficency anemia, nausea, heme positive stool   Post: gastritis, sloughing of distal esophagus mucosa    GALLBLADDER SURGERY      HEMORRHOIDECTOMY      HYSTERECTOMY      KIDNEY SURGERY  04/22/2013    Stent placement    MAZE PROCEDURE      MITRAL VALVE REPLACEMENT      TONSILLECTOMY      TRICUSPID VALVE REPLACEMENT            FAMILY HISTORY  Family History   Adopted: Yes   Problem Relation Age of Onset    No Known Problems Mother     No Known Problems Father          SOCIAL HISTORY  Social History     Socioeconomic History    Marital status:     Number of children: 2   Tobacco Use    Smoking status: Former     Packs/day: 3.00     Years: 54.00     Pack years: 162.00     Types: Cigarettes     Quit date:      Years since quittin.5     Passive exposure: Past    Smokeless tobacco: Never    Tobacco comments:     caffeine - tea or mt dew    Vaping Use    Vaping Use: Never used   Substance and Sexual Activity    Alcohol use: No    Drug use: No    Sexual activity: Defer         ALLERGIES  Bupropion, Cephalexin, and Metoprolol        REVIEW OF SYSTEMS  Review of Systems     All systems reviewed and negative except for those discussed in HPI.       PHYSICAL EXAM    I have reviewed the triage vital signs and nursing notes.    ED Triage Vitals   Temp Heart Rate Resp BP SpO2   23 0847 23 0847 23 0847 23 0903 23 0847   98.9 øF (37.2 øC) 81 20 179/79 92 %      Temp src Heart Rate Source Patient Position BP Location FiO2 (%)   -- -- 23 0903 23 0903 --     Sitting Right arm        Physical Exam  General: No acute distress, nontoxic  HEENT: Mucous membranes moist, atraumatic, EOMI  Neck: Full ROM  Pulm: Symmetric chest rise, nonlabored, lungs slightly diminished bilaterally with mild end expiratory wheezes in the bases bilaterally  Cardiovascular: Regular rate and rhythm, intact distal pulses, no peripheral edema  GI: Soft, nontender, nondistended, no rebound, no guarding, bowel sounds present  MSK: Full ROM, no deformity  Skin: Warm, dry  Neuro: Awake, alert, oriented x 4, GCS 15, moving all extremities, no focal deficits  Psych: Calm, cooperative        LAB RESULTS  Recent Results (from the past 24 hour(s))   ECG 12 Lead ED Triage Standing Order; Chest Pain    Collection Time:  08/02/23  8:57 AM   Result Value Ref Range    QT Interval 425 ms   Comprehensive Metabolic Panel    Collection Time: 08/02/23  9:17 AM    Specimen: Blood   Result Value Ref Range    Glucose 136 (H) 65 - 99 mg/dL    BUN 8 8 - 23 mg/dL    Creatinine 0.85 0.57 - 1.00 mg/dL    Sodium 142 136 - 145 mmol/L    Potassium 3.8 3.5 - 5.2 mmol/L    Chloride 99 98 - 107 mmol/L    CO2 31.0 (H) 22.0 - 29.0 mmol/L    Calcium 10.2 8.6 - 10.5 mg/dL    Total Protein 7.5 6.0 - 8.5 g/dL    Albumin 4.7 3.5 - 5.2 g/dL    ALT (SGPT) 13 1 - 33 U/L    AST (SGOT) 20 1 - 32 U/L    Alkaline Phosphatase 112 39 - 117 U/L    Total Bilirubin 1.0 0.0 - 1.2 mg/dL    Globulin 2.8 gm/dL    A/G Ratio 1.7 g/dL    BUN/Creatinine Ratio 9.4 7.0 - 25.0    Anion Gap 12.0 5.0 - 15.0 mmol/L    eGFR 73.8 >60.0 mL/min/1.73   High Sensitivity Troponin T    Collection Time: 08/02/23  9:17 AM    Specimen: Blood   Result Value Ref Range    HS Troponin T 18 (H) <10 ng/L   Green Top (Gel)    Collection Time: 08/02/23  9:17 AM   Result Value Ref Range    Extra Tube Hold for add-ons.    Lavender Top    Collection Time: 08/02/23  9:17 AM   Result Value Ref Range    Extra Tube hold for add-on    Gold Top - SST    Collection Time: 08/02/23  9:17 AM   Result Value Ref Range    Extra Tube Hold for add-ons.    Light Blue Top    Collection Time: 08/02/23  9:17 AM   Result Value Ref Range    Extra Tube Hold for add-ons.    CBC Auto Differential    Collection Time: 08/02/23  9:17 AM    Specimen: Blood   Result Value Ref Range    WBC 12.00 (H) 3.40 - 10.80 10*3/mm3    RBC 4.45 3.77 - 5.28 10*6/mm3    Hemoglobin 12.6 12.0 - 15.9 g/dL    Hematocrit 37.8 34.0 - 46.6 %    MCV 84.9 79.0 - 97.0 fL    MCH 28.3 26.6 - 33.0 pg    MCHC 33.3 31.5 - 35.7 g/dL    RDW 12.8 12.3 - 15.4 %    RDW-SD 38.8 37.0 - 54.0 fl    MPV 10.8 6.0 - 12.0 fL    Platelets 214 140 - 450 10*3/mm3    Neutrophil % 83.9 (H) 42.7 - 76.0 %    Lymphocyte % 10.7 (L) 19.6 - 45.3 %    Monocyte % 3.8 (L) 5.0 - 12.0 %     Eosinophil % 0.7 0.3 - 6.2 %    Basophil % 0.3 0.0 - 1.5 %    Immature Grans % 0.6 (H) 0.0 - 0.5 %    Neutrophils, Absolute 10.08 (H) 1.70 - 7.00 10*3/mm3    Lymphocytes, Absolute 1.28 0.70 - 3.10 10*3/mm3    Monocytes, Absolute 0.46 0.10 - 0.90 10*3/mm3    Eosinophils, Absolute 0.08 0.00 - 0.40 10*3/mm3    Basophils, Absolute 0.03 0.00 - 0.20 10*3/mm3    Immature Grans, Absolute 0.07 (H) 0.00 - 0.05 10*3/mm3    nRBC 0.0 0.0 - 0.2 /100 WBC   BNP    Collection Time: 08/02/23  9:17 AM    Specimen: Blood   Result Value Ref Range    proBNP 1,348.0 (H) 0.0 - 900.0 pg/mL       Ordered the above labs and independently interpreted results. My findings will be discussed in the medical decision making section below        RADIOLOGY  XR Chest 1 View    Result Date: 8/2/2023  XR CHEST 1 VW-  HISTORY: Female who is 68 years-old,  chest pain  TECHNIQUE: Frontal views of the chest  COMPARISON: 03/28/2023  FINDINGS: The heart size is normal. Aorta is calcified. Pulmonary vasculature is unremarkable. Sternotomy wires are present. Chronic changes of the lungs are seen. No acute infiltrate is identified. No pleural effusion, or pneumothorax. No acute osseous process.      Chronic changes. No acute infiltrate. Follow-up/further evaluation can be obtained as indications persist.  This report was finalized on 8/2/2023 9:27 AM by Dr. Chapincito Garcia M.D.       Ordered the above noted radiological studies.  Independently interpreted by me and my independent review of findings can be found in the ED Course.  See dictation for official radiology interpretation.      PROCEDURES    Procedures  EKG - Per my independent interpretation:    EKG Time: 0857  Rhythm/Rate: Sinus rhythm with a rate of 76  Normal axis  Normal intervals  Borderline nonspecific T wave abnormalities  No STEMI       No emergent changes compared to March 28, 2023      MEDICATIONS GIVEN IN ER    Medications   sodium chloride 0.9 % flush 10 mL (has no administration in time  range)   aspirin tablet 325 mg (325 mg Oral Not Given 8/2/23 0927)   ipratropium-albuterol (DUO-NEB) nebulizer solution 3 mL (3 mL Nebulization Given 8/2/23 0936)   methylPREDNISolone sodium succinate (SOLU-Medrol) injection 125 mg (125 mg Intravenous Given 8/2/23 0927)         PROGRESS, DATA ANALYSIS, CONSULTS, AND MEDICAL DECISION MAKING    Please note that this section constitutes my independent interpretation of clinical data including lab results, radiology, EKG's.  This constitutes my independent professional opinion regarding differential diagnosis and management of this patient.  It may include any factors such as history from outside sources, review of external records, social determinants of health, management of medications, response to those treatments, and discussions with other providers.    Differential Diagnosis and Plan: Initial concern for COPD exacerbation, viral respiratory process, pneumonia, bronchitis, heart failure, renal failure, electrolyte abnormalities, anemia, arrhythmia, among others.  Plan for labs, chest x-ray, EKG, symptomatic control, and reevaluation with results.    Additional sources:  - Discussed/ obtained information from independent historians:       - Chronic or social conditions impacting care:      - Shared decision making:  Patient and family at bedside fully updated on and in agreement with the course and plan moving forward    ED Course as of 08/02/23 1355   Wed Aug 02, 2023   0917 XR Chest 1 View  Per my independent interpretation of the chest x-ray, no evidence of pneumothorax [DC]   0936 WBC(!): 12.00 [DC]   0936 Hemoglobin: 12.6 [DC]   0936 Platelets: 214 [DC]   1004 Glucose(!): 136 [DC]   1004 BUN: 8 [DC]   1004 Creatinine: 0.85 [DC]   1004 Sodium: 142 [DC]   1004 Potassium: 3.8 [DC]   1004 ALT (SGPT): 13 [DC]   1004 AST (SGOT): 20 [DC]   1004 Alkaline Phosphatase: 112 [DC]   1004 Total Bilirubin: 1.0 [DC]   1004 HS Troponin T(!): 18  35 four months ago [DC]   1004  proBNP(!): 1,348.0  1525 four months ago [DC]   1004 XR Chest 1 View  Radiology report reviewed, no evidence of any acute emergent findings [DC]   1059 On reevaluation patient is feeling improved, at this time things are stable and I do not see any acute emergent issues.  Suspect underlying COPD exacerbation possibly with underlying bronchitis, she was recently started on antibiotics so I will put on a couple days of steroids but regular outpatient follow-up, ED return for worsening symptoms as needed.  All questions and concerns addressed. [DC]   1136 Troponin T Delta: 0 [DC]      ED Course User Index  [DC] Gavino Esteban MD       Hospitalization Considered?:     Orders Placed During This Visit:  Orders Placed This Encounter   Procedures    XR Chest 1 View    [place] Draw    Comprehensive Metabolic Panel    High Sensitivity Troponin T    CBC Auto Differential    BNP    High Sensitivity Troponin T 2Hr    NPO Diet NPO Type: Strict NPO    Undress & Gown    Continuous Pulse Oximetry    Oxygen Therapy- Nasal Cannula; Titrate 1-6 LPM Per SpO2; 90 - 95%    ECG 12 Lead ED Triage Standing Order; Chest Pain    ECG 12 Lead ED Triage Standing Order; Chest Pain    Insert Peripheral IV    CBC & Differential    Green Top (Gel)    Lavender Top    Gold Top - SST    Light Blue Top       Additional orders considered but not placed:      Independent interpretation of labs, radiology studies, and discussions with consultants: See ED Course        AS OF 11:08 EDT VITALS:    BP - 153/59  HR - 74  TEMP - 98.9 øF (37.2 øC)  02 SATS - 96%        DIAGNOSIS  Final diagnoses:   COPD exacerbation   History of CHF (congestive heart failure)   History of atrial fibrillation   History of hypertension         DISPOSITION  DISCHARGE    Patient discharged in stable condition.    Reviewed implications of results, diagnosis, meds, responsibility to follow up, warning signs and symptoms of possible worsening, potential complications  and reasons to return to ER. If your blood pressure > 120/80 please follow up with your primary care provider for further management.    Patient/Family voiced understanding of above instructions.    Discussed plan for discharge, as there is no emergent indication for admission. Pt/family is agreeable and understands need for follow up and repeat testing.  Pt is aware that discharge does not mean that nothing is wrong but it indicates no emergency is present that requires admission and they must continue care with follow-up as given below or physician of their choice.     FOLLOW-UP  [facility] EMERGENCY DEPARTMENT  [address] [facility] [address] [phone]    As needed, If symptoms worsen    Javan Martinez MD  [address] [address] [phone]    Schedule an appointment as soon as possible for a visit   for recheck within 1-2 weeks         Medication List        New Prescriptions      predniSONE 20 MG tablet  Commonly known as: DELTASONE  Take 2 tablets by mouth Daily for 4 days.               Where to Get Your Medications        These medications were sent to [id number] - [place] [id number] AT [facility] OF [address] [facility] & [place] - [phone] [facility] [phone]   [address] [place] [address]      Phone: [phone]   predniSONE 20 MG tablet                       --    Please note that portions of this were completed with a voice recognition program.       Note Disclaimer: At [facility], we believe that sharing information builds trust and better relationships. You are receiving this note because you are receiving care at [facility] or recently visited. It is possible you will see health information before a provider has talked with you about it. This kind of information can be easy to misunderstand. To help you fully understand what it means for your health, we urge you to discuss this note with your  provider.           Gavino Esteban MD  08/02/23 0403

## 2023-08-02 NOTE — DISCHARGE INSTRUCTIONS
Take medications as prescribed, continue with all other current medications, follow-up with PCP and pulmonary team as discussed, ED return for worsening symptoms as needed.

## 2023-11-17 ENCOUNTER — APPOINTMENT (OUTPATIENT)
Dept: GENERAL RADIOLOGY | Facility: HOSPITAL | Age: 68
End: 2023-11-17
Payer: MEDICARE

## 2023-11-17 ENCOUNTER — APPOINTMENT (OUTPATIENT)
Dept: CT IMAGING | Facility: HOSPITAL | Age: 68
End: 2023-11-17
Payer: MEDICARE

## 2023-11-17 ENCOUNTER — HOSPITAL ENCOUNTER (OUTPATIENT)
Facility: HOSPITAL | Age: 68
Setting detail: OBSERVATION
Discharge: HOME | End: 2023-11-18
Attending: STUDENT IN AN ORGANIZED HEALTH CARE EDUCATION/TRAINING PROGRAM | Admitting: INTERNAL MEDICINE
Payer: MEDICARE

## 2023-11-17 DIAGNOSIS — J44.1 COPD EXACERBATION: Primary | ICD-10-CM

## 2023-11-17 LAB
ALBUMIN SERPL-MCNC: 4.9 G/DL (ref 3.5–5.2)
ALBUMIN/GLOB SERPL: 1.8 G/DL
ALP SERPL-CCNC: 102 U/L (ref 39–117)
ALT SERPL W P-5'-P-CCNC: 18 U/L (ref 1–33)
ANION GAP SERPL CALCULATED.3IONS-SCNC: 11 MMOL/L (ref 5–15)
AST SERPL-CCNC: 19 U/L (ref 1–32)
B PARAPERT DNA SPEC QL NAA+PROBE: NOT DETECTED
B PERT DNA SPEC QL NAA+PROBE: NOT DETECTED
BASOPHILS # BLD AUTO: 0.04 10*3/MM3 (ref 0–0.2)
BASOPHILS NFR BLD AUTO: 0.2 % (ref 0–1.5)
BILIRUB SERPL-MCNC: 1.3 MG/DL (ref 0–1.2)
BUN SERPL-MCNC: 14 MG/DL (ref 8–23)
BUN/CREAT SERPL: 13.9 (ref 7–25)
C PNEUM DNA NPH QL NAA+NON-PROBE: NOT DETECTED
CALCIUM SPEC-SCNC: 9.8 MG/DL (ref 8.6–10.5)
CHLORIDE SERPL-SCNC: 101 MMOL/L (ref 98–107)
CO2 SERPL-SCNC: 29 MMOL/L (ref 22–29)
CREAT SERPL-MCNC: 1.01 MG/DL (ref 0.57–1)
D-LACTATE SERPL-SCNC: 0.9 MMOL/L (ref 0.5–2)
DEPRECATED RDW RBC AUTO: 41.1 FL (ref 37–54)
EGFRCR SERPLBLD CKD-EPI 2021: 60 ML/MIN/1.73
EOSINOPHIL # BLD AUTO: 0.03 10*3/MM3 (ref 0–0.4)
EOSINOPHIL NFR BLD AUTO: 0.2 % (ref 0.3–6.2)
ERYTHROCYTE [DISTWIDTH] IN BLOOD BY AUTOMATED COUNT: 13.3 % (ref 12.3–15.4)
FLUAV SUBTYP SPEC NAA+PROBE: NOT DETECTED
FLUBV RNA ISLT QL NAA+PROBE: NOT DETECTED
GLOBULIN UR ELPH-MCNC: 2.8 GM/DL
GLUCOSE SERPL-MCNC: 116 MG/DL (ref 65–99)
HADV DNA SPEC NAA+PROBE: NOT DETECTED
HCOV 229E RNA SPEC QL NAA+PROBE: NOT DETECTED
HCOV HKU1 RNA SPEC QL NAA+PROBE: NOT DETECTED
HCOV NL63 RNA SPEC QL NAA+PROBE: NOT DETECTED
HCOV OC43 RNA SPEC QL NAA+PROBE: NOT DETECTED
HCT VFR BLD AUTO: 39.1 % (ref 34–46.6)
HGB BLD-MCNC: 13 G/DL (ref 12–15.9)
HMPV RNA NPH QL NAA+NON-PROBE: NOT DETECTED
HOLD SPECIMEN: NORMAL
HOLD SPECIMEN: NORMAL
HPIV1 RNA ISLT QL NAA+PROBE: NOT DETECTED
HPIV2 RNA SPEC QL NAA+PROBE: NOT DETECTED
HPIV3 RNA NPH QL NAA+PROBE: NOT DETECTED
HPIV4 P GENE NPH QL NAA+PROBE: NOT DETECTED
IMM GRANULOCYTES # BLD AUTO: 0.09 10*3/MM3 (ref 0–0.05)
IMM GRANULOCYTES NFR BLD AUTO: 0.5 % (ref 0–0.5)
LYMPHOCYTES # BLD AUTO: 1.15 10*3/MM3 (ref 0.7–3.1)
LYMPHOCYTES NFR BLD AUTO: 6.3 % (ref 19.6–45.3)
M PNEUMO IGG SER IA-ACNC: NOT DETECTED
MCH RBC QN AUTO: 28.7 PG (ref 26.6–33)
MCHC RBC AUTO-ENTMCNC: 33.2 G/DL (ref 31.5–35.7)
MCV RBC AUTO: 86.3 FL (ref 79–97)
MONOCYTES # BLD AUTO: 0.75 10*3/MM3 (ref 0.1–0.9)
MONOCYTES NFR BLD AUTO: 4.1 % (ref 5–12)
NEUTROPHILS NFR BLD AUTO: 16.09 10*3/MM3 (ref 1.7–7)
NEUTROPHILS NFR BLD AUTO: 88.7 % (ref 42.7–76)
NRBC BLD AUTO-RTO: 0 /100 WBC (ref 0–0.2)
NT-PROBNP SERPL-MCNC: 839 PG/ML (ref 0–900)
PLATELET # BLD AUTO: 211 10*3/MM3 (ref 140–450)
PMV BLD AUTO: 10.5 FL (ref 6–12)
POTASSIUM SERPL-SCNC: 3.7 MMOL/L (ref 3.5–5.2)
PROCALCITONIN SERPL-MCNC: 0.07 NG/ML (ref 0–0.25)
PROT SERPL-MCNC: 7.7 G/DL (ref 6–8.5)
RBC # BLD AUTO: 4.53 10*6/MM3 (ref 3.77–5.28)
RHINOVIRUS RNA SPEC NAA+PROBE: NOT DETECTED
RSV RNA NPH QL NAA+NON-PROBE: NOT DETECTED
SARS-COV-2 RNA NPH QL NAA+NON-PROBE: NOT DETECTED
SODIUM SERPL-SCNC: 141 MMOL/L (ref 136–145)
TROPONIN T SERPL HS-MCNC: 20 NG/L
WBC NRBC COR # BLD AUTO: 18.15 10*3/MM3 (ref 3.4–10.8)
WHOLE BLOOD HOLD COAG: NORMAL
WHOLE BLOOD HOLD SPECIMEN: NORMAL

## 2023-11-17 PROCEDURE — 93010 ELECTROCARDIOGRAM REPORT: CPT | Performed by: INTERNAL MEDICINE

## 2023-11-17 PROCEDURE — 36415 COLL VENOUS BLD VENIPUNCTURE: CPT

## 2023-11-17 PROCEDURE — 84484 ASSAY OF TROPONIN QUANT: CPT | Performed by: STUDENT IN AN ORGANIZED HEALTH CARE EDUCATION/TRAINING PROGRAM

## 2023-11-17 PROCEDURE — 83880 ASSAY OF NATRIURETIC PEPTIDE: CPT | Performed by: STUDENT IN AN ORGANIZED HEALTH CARE EDUCATION/TRAINING PROGRAM

## 2023-11-17 PROCEDURE — 83605 ASSAY OF LACTIC ACID: CPT | Performed by: STUDENT IN AN ORGANIZED HEALTH CARE EDUCATION/TRAINING PROGRAM

## 2023-11-17 PROCEDURE — 0202U NFCT DS 22 TRGT SARS-COV-2: CPT | Performed by: STUDENT IN AN ORGANIZED HEALTH CARE EDUCATION/TRAINING PROGRAM

## 2023-11-17 PROCEDURE — 94640 AIRWAY INHALATION TREATMENT: CPT

## 2023-11-17 PROCEDURE — 94799 UNLISTED PULMONARY SVC/PX: CPT

## 2023-11-17 PROCEDURE — G0378 HOSPITAL OBSERVATION PER HR: HCPCS

## 2023-11-17 PROCEDURE — 63710000001 PREDNISONE PER 1 MG: Performed by: NURSE PRACTITIONER

## 2023-11-17 PROCEDURE — 71250 CT THORAX DX C-: CPT

## 2023-11-17 PROCEDURE — 93005 ELECTROCARDIOGRAM TRACING: CPT | Performed by: STUDENT IN AN ORGANIZED HEALTH CARE EDUCATION/TRAINING PROGRAM

## 2023-11-17 PROCEDURE — 96374 THER/PROPH/DIAG INJ IV PUSH: CPT

## 2023-11-17 PROCEDURE — 25010000002 METHYLPREDNISOLONE PER 125 MG: Performed by: STUDENT IN AN ORGANIZED HEALTH CARE EDUCATION/TRAINING PROGRAM

## 2023-11-17 PROCEDURE — 99284 EMERGENCY DEPT VISIT MOD MDM: CPT

## 2023-11-17 PROCEDURE — 84145 PROCALCITONIN (PCT): CPT | Performed by: NURSE PRACTITIONER

## 2023-11-17 PROCEDURE — 85025 COMPLETE CBC W/AUTO DIFF WBC: CPT | Performed by: STUDENT IN AN ORGANIZED HEALTH CARE EDUCATION/TRAINING PROGRAM

## 2023-11-17 PROCEDURE — 80053 COMPREHEN METABOLIC PANEL: CPT | Performed by: STUDENT IN AN ORGANIZED HEALTH CARE EDUCATION/TRAINING PROGRAM

## 2023-11-17 PROCEDURE — 25010000002 LEVOFLOXACIN PER 250 MG: Performed by: STUDENT IN AN ORGANIZED HEALTH CARE EDUCATION/TRAINING PROGRAM

## 2023-11-17 PROCEDURE — 71045 X-RAY EXAM CHEST 1 VIEW: CPT

## 2023-11-17 PROCEDURE — 87040 BLOOD CULTURE FOR BACTERIA: CPT | Performed by: STUDENT IN AN ORGANIZED HEALTH CARE EDUCATION/TRAINING PROGRAM

## 2023-11-17 RX ORDER — AMLODIPINE BESYLATE 10 MG/1
10 TABLET ORAL DAILY
Status: DISCONTINUED | OUTPATIENT
Start: 2023-11-18 | End: 2023-11-18 | Stop reason: HOSPADM

## 2023-11-17 RX ORDER — ATORVASTATIN CALCIUM 20 MG/1
40 TABLET, FILM COATED ORAL DAILY
Status: DISCONTINUED | OUTPATIENT
Start: 2023-11-18 | End: 2023-11-18 | Stop reason: HOSPADM

## 2023-11-17 RX ORDER — LISINOPRIL 40 MG/1
40 TABLET ORAL DAILY
Status: DISCONTINUED | OUTPATIENT
Start: 2023-11-18 | End: 2023-11-18 | Stop reason: HOSPADM

## 2023-11-17 RX ORDER — LEVOFLOXACIN 5 MG/ML
750 INJECTION, SOLUTION INTRAVENOUS ONCE
Status: COMPLETED | OUTPATIENT
Start: 2023-11-17 | End: 2023-11-17

## 2023-11-17 RX ORDER — HYDROCODONE BITARTRATE AND ACETAMINOPHEN 7.5; 325 MG/1; MG/1
1 TABLET ORAL EVERY 6 HOURS PRN
Status: DISCONTINUED | OUTPATIENT
Start: 2023-11-17 | End: 2023-11-18 | Stop reason: HOSPADM

## 2023-11-17 RX ORDER — ONDANSETRON 2 MG/ML
4 INJECTION INTRAMUSCULAR; INTRAVENOUS EVERY 6 HOURS PRN
Status: DISCONTINUED | OUTPATIENT
Start: 2023-11-17 | End: 2023-11-18 | Stop reason: HOSPADM

## 2023-11-17 RX ORDER — ACETAMINOPHEN 500 MG
1000 TABLET ORAL ONCE
Status: COMPLETED | OUTPATIENT
Start: 2023-11-17 | End: 2023-11-17

## 2023-11-17 RX ORDER — BENZONATATE 100 MG/1
200 CAPSULE ORAL 3 TIMES DAILY PRN
Status: DISCONTINUED | OUTPATIENT
Start: 2023-11-17 | End: 2023-11-18 | Stop reason: HOSPADM

## 2023-11-17 RX ORDER — ASPIRIN 81 MG/1
81 TABLET ORAL DAILY
Status: DISCONTINUED | OUTPATIENT
Start: 2023-11-18 | End: 2023-11-18 | Stop reason: HOSPADM

## 2023-11-17 RX ORDER — CARVEDILOL 6.25 MG/1
6.25 TABLET ORAL 2 TIMES DAILY WITH MEALS
Status: DISCONTINUED | OUTPATIENT
Start: 2023-11-17 | End: 2023-11-18 | Stop reason: HOSPADM

## 2023-11-17 RX ORDER — ALBUTEROL SULFATE 2.5 MG/3ML
2.5 SOLUTION RESPIRATORY (INHALATION)
Status: COMPLETED | OUTPATIENT
Start: 2023-11-17 | End: 2023-11-17

## 2023-11-17 RX ORDER — ACETAMINOPHEN 325 MG/1
650 TABLET ORAL EVERY 4 HOURS PRN
Status: DISCONTINUED | OUTPATIENT
Start: 2023-11-17 | End: 2023-11-18 | Stop reason: HOSPADM

## 2023-11-17 RX ORDER — ROFLUMILAST 500 UG/1
500 TABLET ORAL DAILY
Status: DISCONTINUED | OUTPATIENT
Start: 2023-11-18 | End: 2023-11-18 | Stop reason: HOSPADM

## 2023-11-17 RX ORDER — PREDNISONE 20 MG/1
40 TABLET ORAL
Qty: 10 TABLET | Refills: 0 | Status: DISCONTINUED | OUTPATIENT
Start: 2023-11-17 | End: 2023-11-18 | Stop reason: HOSPADM

## 2023-11-17 RX ORDER — ALBUTEROL SULFATE 2.5 MG/3ML
2.5 SOLUTION RESPIRATORY (INHALATION) EVERY 4 HOURS PRN
Status: DISCONTINUED | OUTPATIENT
Start: 2023-11-17 | End: 2023-11-18 | Stop reason: HOSPADM

## 2023-11-17 RX ORDER — CETIRIZINE HYDROCHLORIDE 10 MG/1
10 TABLET ORAL DAILY
Status: DISCONTINUED | OUTPATIENT
Start: 2023-11-18 | End: 2023-11-18 | Stop reason: HOSPADM

## 2023-11-17 RX ORDER — MULTIPLE VITAMINS W/ MINERALS TAB 9MG-400MCG
1 TAB ORAL DAILY
Status: DISCONTINUED | OUTPATIENT
Start: 2023-11-18 | End: 2023-11-18 | Stop reason: HOSPADM

## 2023-11-17 RX ORDER — ONDANSETRON 4 MG/1
4 TABLET, FILM COATED ORAL EVERY 6 HOURS PRN
Status: DISCONTINUED | OUTPATIENT
Start: 2023-11-17 | End: 2023-11-18 | Stop reason: HOSPADM

## 2023-11-17 RX ORDER — GUAIFENESIN 600 MG/1
1200 TABLET, EXTENDED RELEASE ORAL EVERY 12 HOURS SCHEDULED
Status: DISCONTINUED | OUTPATIENT
Start: 2023-11-17 | End: 2023-11-18 | Stop reason: HOSPADM

## 2023-11-17 RX ORDER — POTASSIUM CHLORIDE 750 MG/1
20 TABLET, FILM COATED, EXTENDED RELEASE ORAL DAILY
Status: DISCONTINUED | OUTPATIENT
Start: 2023-11-18 | End: 2023-11-18 | Stop reason: HOSPADM

## 2023-11-17 RX ORDER — CYCLOBENZAPRINE HCL 10 MG
10 TABLET ORAL NIGHTLY
Status: DISCONTINUED | OUTPATIENT
Start: 2023-11-17 | End: 2023-11-18 | Stop reason: HOSPADM

## 2023-11-17 RX ORDER — PANTOPRAZOLE SODIUM 40 MG/1
40 TABLET, DELAYED RELEASE ORAL
Status: DISCONTINUED | OUTPATIENT
Start: 2023-11-18 | End: 2023-11-18 | Stop reason: HOSPADM

## 2023-11-17 RX ORDER — BUDESONIDE 0.5 MG/2ML
0.5 INHALANT ORAL
Status: DISCONTINUED | OUTPATIENT
Start: 2023-11-17 | End: 2023-11-18 | Stop reason: HOSPADM

## 2023-11-17 RX ORDER — SODIUM CHLORIDE 0.9 % (FLUSH) 0.9 %
10 SYRINGE (ML) INJECTION AS NEEDED
Status: DISCONTINUED | OUTPATIENT
Start: 2023-11-17 | End: 2023-11-18 | Stop reason: HOSPADM

## 2023-11-17 RX ORDER — METHYLPREDNISOLONE SODIUM SUCCINATE 125 MG/2ML
125 INJECTION, POWDER, LYOPHILIZED, FOR SOLUTION INTRAMUSCULAR; INTRAVENOUS ONCE
Status: COMPLETED | OUTPATIENT
Start: 2023-11-17 | End: 2023-11-17

## 2023-11-17 RX ORDER — ROPINIROLE 2 MG/1
2 TABLET, FILM COATED ORAL NIGHTLY
Status: DISCONTINUED | OUTPATIENT
Start: 2023-11-17 | End: 2023-11-18 | Stop reason: HOSPADM

## 2023-11-17 RX ORDER — FUROSEMIDE 40 MG/1
40 TABLET ORAL 2 TIMES DAILY
Status: DISCONTINUED | OUTPATIENT
Start: 2023-11-17 | End: 2023-11-18 | Stop reason: HOSPADM

## 2023-11-17 RX ORDER — NITROGLYCERIN 0.4 MG/1
0.4 TABLET SUBLINGUAL
Status: DISCONTINUED | OUTPATIENT
Start: 2023-11-17 | End: 2023-11-18 | Stop reason: HOSPADM

## 2023-11-17 RX ORDER — SERTRALINE HYDROCHLORIDE 100 MG/1
100 TABLET, FILM COATED ORAL DAILY
Status: DISCONTINUED | OUTPATIENT
Start: 2023-11-18 | End: 2023-11-18 | Stop reason: HOSPADM

## 2023-11-17 RX ORDER — ALUMINA, MAGNESIA, AND SIMETHICONE 2400; 2400; 240 MG/30ML; MG/30ML; MG/30ML
15 SUSPENSION ORAL EVERY 6 HOURS PRN
Status: DISCONTINUED | OUTPATIENT
Start: 2023-11-17 | End: 2023-11-18 | Stop reason: HOSPADM

## 2023-11-17 RX ADMIN — FUROSEMIDE 40 MG: 40 TABLET ORAL at 22:30

## 2023-11-17 RX ADMIN — ALBUTEROL SULFATE 2.5 MG: 2.5 SOLUTION RESPIRATORY (INHALATION) at 19:04

## 2023-11-17 RX ADMIN — LEVOFLOXACIN 750 MG: 5 INJECTION, SOLUTION INTRAVENOUS at 19:53

## 2023-11-17 RX ADMIN — ROPINIROLE 2 MG: 2 TABLET, FILM COATED ORAL at 22:30

## 2023-11-17 RX ADMIN — ALBUTEROL SULFATE 2.5 MG: 2.5 SOLUTION RESPIRATORY (INHALATION) at 18:53

## 2023-11-17 RX ADMIN — ACETAMINOPHEN 1000 MG: 500 TABLET ORAL at 20:32

## 2023-11-17 RX ADMIN — CYCLOBENZAPRINE 10 MG: 10 TABLET, FILM COATED ORAL at 22:30

## 2023-11-17 RX ADMIN — GUAIFENESIN 1200 MG: 600 TABLET, EXTENDED RELEASE ORAL at 22:30

## 2023-11-17 RX ADMIN — CARVEDILOL 6.25 MG: 6.25 TABLET, FILM COATED ORAL at 22:30

## 2023-11-17 RX ADMIN — METHYLPREDNISOLONE SODIUM SUCCINATE 125 MG: 125 INJECTION, POWDER, FOR SOLUTION INTRAMUSCULAR; INTRAVENOUS at 19:30

## 2023-11-17 RX ADMIN — PREDNISONE 40 MG: 20 TABLET ORAL at 22:30

## 2023-11-17 NOTE — ED PROVIDER NOTES
EMERGENCY DEPARTMENT ENCOUNTER    Room Number:  34/34  PCP: Javan Martinez MD  Historian: Patient, family member at bedside      HPI:  Chief Complaint: Shortness of breath    Context: Paz Browne is a 68 y.o. female who presents to the ED c/o shortness of breath.  Patient states for the last 2 to 3 days she has been increasingly short of breath.  Patient has a history of COPD and wears 2 L O2 at baseline.  Patient states he had a fever last night and has had increased cough that is nonproductive.  Patient states it feels like she cannot get enough air in.  Patient states she was concerned she was not can make it to the hospital in time due to how short of air she felt.  She has a history of pneumonia.            PAST MEDICAL HISTORY  Active Ambulatory Problems     Diagnosis Date Noted    PAF (paroxysmal atrial fibrillation) 01/25/2016    Hypertension     Hyperlipidemia     Pain medication agreement 05/04/2016    Hiatal hernia 05/04/2016    Primary osteoarthritis involving multiple joints 05/04/2016    Pulmonary hypertension     S/P TVR (tricuspid valve repair) 07/07/2016    Pulmonary nodule 10/13/2016    Restless leg syndrome 11/18/2016    Chronic low back pain 08/02/2017    Dysplastic polyp of colon 08/07/2017    History of mitral valve replacement with tissue graft 08/11/2017    Chronic hypoxemic respiratory failure 08/13/2017    Anemia 08/09/2017    Celiac artery stenosis 08/24/2017    Intertrigo 08/25/2017    Abnormal EKG 06/30/2019    Muscle spasms of both lower extremities 12/22/2020    Iron deficiency anemia 03/30/2021    Adenomatous polyp of colon 03/30/2021    Gastroesophageal reflux disease without esophagitis 03/30/2021    Headache 07/04/2021    History of endocarditis 02/03/2022    Pneumonia of both lower lobes due to infectious organism 10/16/2022    Peptic ulcer disease 10/17/2022    Peripheral vascular disease 10/17/2022    Hyperlipidemia 10/17/2022    Hyperglycemia 10/17/2022    COPD with  acute exacerbation 10/19/2022    Chronic diastolic CHF (congestive heart failure) 03/29/2023    Chronic bilateral low back pain 04/19/2023     Resolved Ambulatory Problems     Diagnosis Date Noted    Tachycardia     Renal failure     Palpitations     Mitral regurgitation     Heart failure 06/08/2016    Long term current use of anticoagulant 10/13/2016    Aspiration pneumonia 10/14/2016    Hemoptysis 10/14/2016    Lower GI bleed 08/09/2017    Precordial pain 08/11/2017    Oral candidiasis 08/14/2017    VAN (acute kidney injury) 08/15/2017    GI bleed 12/01/2017    Acute exacerbation of chronic obstructive pulmonary disease (COPD) 01/02/2018    Pneumonia due to infectious organism 07/18/2018    Insomnia due to medical condition 02/01/2019    COPD (chronic obstructive pulmonary disease) 06/30/2019    Shingles 04/08/2020    Vertigo 10/06/2020    Dark stools 03/30/2021    Acute hyperkalemia 05/10/2021    Tremor 05/10/2021    Anemia 05/10/2021    COPD exacerbation 06/14/2021    Septicemia 07/02/2021    Bacteremia 07/03/2021    Acute hypoxemic respiratory failure 10/17/2022    COPD (chronic obstructive pulmonary disease) 10/17/2022    Pulmonary nodule 10/17/2022    Hypokalemia 10/17/2022    Chronic respiratory failure 10/19/2022     Past Medical History:   Diagnosis Date    Asthma     Atrial flutter     Cataract     Chronic respiratory failure with hypoxia     Colon polyp     History of CHF (congestive heart failure)     History of home oxygen therapy     Infectious viral hepatitis     Long term (current) use of anticoagulants     Pneumonia          PAST SURGICAL HISTORY  Past Surgical History:   Procedure Laterality Date    CARDIAC CATHETERIZATION  09/01/2014    Right dominant systemt, normal coronary arteries.     CARDIAC CATHETERIZATION Left 6/10/2016    Procedure: Cardiac catheterization;  Surgeon: Sergei Hall MD;  Location: [facility] CATH INVASIVE LOCATION;  Service:     CARDIAC CATHETERIZATION N/A 6/10/2016    Procedure:  Right Heart Cath;  Surgeon: Sergei Hall MD;  Location: [facility] CATH INVASIVE LOCATION;  Service:     CATARACT EXTRACTION      COLONOSCOPY      COLONOSCOPY N/A 8/4/2017    Procedure: COLONOSCOPY TO CECUM/TI WITH POLYPECTOMY ( COLD BX);  Surgeon: Cleveland Devine MD;  Location: [facility] ENDOSCOPY;  Service:     COLONOSCOPY N/A 8/10/2017    Procedure: COLONOSCOPY to cecum and TI with 2 clips placed at transverse;  Surgeon: Earnest PALOMO MD;  Location: [facility] ENDOSCOPY;  Service:     COLONOSCOPY N/A 12/22/2017    Procedure: COLONOSCOPY INTO CECUM WITH COLD POLYPECTOMIES;  Surgeon: Cleveland Devine MD;  Location: [facility] ENDOSCOPY;  Service:     COLONOSCOPY N/A 5/17/2021    Procedure: COLONOSCOPY to cecum;  Surgeon: Cleveland Devine MD;  Location: [facility] ENDOSCOPY;  Service: Gastroenterology;  Laterality: N/A;  Pre: Fe deficency anemia, h/x of polyps  Post: fair prep, normal    ENDOSCOPY N/A 8/17/2017    Procedure: ESOPHAGOGASTRODUODENOSCOPY;  Surgeon: Porsha Ruby MD;  Location: [facility] ENDOSCOPY;  Service:     ENDOSCOPY N/A 12/22/2017    Procedure: ESOPHAGOGASTRODUODENOSCOPY WITH BIOPSIES;  Surgeon: Cleveland Devine MD;  Location: [facility] ENDOSCOPY;  Service:     ENDOSCOPY N/A 5/17/2021    Procedure: ESOPHAGOGASTRODUODENOSCOPY  with biopsies;  Surgeon: Cleveland Devine MD;  Location: [facility] ENDOSCOPY;  Service: Gastroenterology;  Laterality: N/A;  Pre: Fe deficency anemia, nausea, heme positive stool   Post: gastritis, sloughing of distal esophagus mucosa    GALLBLADDER SURGERY      HEMORRHOIDECTOMY      HYSTERECTOMY      KIDNEY SURGERY  04/22/2013    Stent placement    MAZE PROCEDURE      MITRAL VALVE REPLACEMENT      TONSILLECTOMY      TRICUSPID VALVE REPLACEMENT           FAMILY HISTORY  Family History   Adopted: Yes   Problem Relation Age of Onset    No Known Problems Mother     No Known Problems Father          SOCIAL HISTORY  Social History     Socioeconomic History    Marital status:     Number of children: 2    Tobacco Use    Smoking status: Former     Packs/day: 3.00     Years: 54.00     Additional pack years: 0.00     Total pack years: 162.00     Types: Cigarettes     Quit date:      Years since quittin.8     Passive exposure: Past    Smokeless tobacco: Never    Tobacco comments:     caffeine - tea or mt dew    Vaping Use    Vaping Use: Never used   Substance and Sexual Activity    Alcohol use: No    Drug use: No    Sexual activity: Defer         ALLERGIES  Bupropion, Cephalexin, and Metoprolol        REVIEW OF SYSTEMS  Review of Systems   Respiratory:  Positive for cough, chest tightness and shortness of breath.    All other systems reviewed and are negative.           PHYSICAL EXAM  ED Triage Vitals [23 1746]   Temp Heart Rate Resp BP SpO2   96.2 °F (35.7 °C) 91 22 -- 90 %      Temp src Heart Rate Source Patient Position BP Location FiO2 (%)   Tympanic Monitor -- -- --       Physical Exam      GENERAL: no acute distress  HENT: nares patent  EYES: no scleral icterus  CV: regular rhythm, normal rate  RESPIRATORY: normal effort.  On supplementary O2, and expiratory wheeze with decreased breath sounds bilaterally  ABDOMEN: soft  MUSCULOSKELETAL: no deformity  NEURO: alert, moves all extremities, follows commands  PSYCH:  calm, cooperative  SKIN: warm, dry    Vital signs and nursing notes reviewed.          LAB RESULTS  Recent Results (from the past 24 hour(s))   ECG 12 Lead ED Triage Standing Order; SOA    Collection Time: 23  5:58 PM   Result Value Ref Range    QT Interval 364 ms    QTC Interval 458 ms   Comprehensive Metabolic Panel    Collection Time: 23  6:12 PM    Specimen: Blood   Result Value Ref Range    Glucose 116 (H) 65 - 99 mg/dL    BUN 14 8 - 23 mg/dL    Creatinine 1.01 (H) 0.57 - 1.00 mg/dL    Sodium 141 136 - 145 mmol/L    Potassium 3.7 3.5 - 5.2 mmol/L    Chloride 101 98 - 107 mmol/L    CO2 29.0 22.0 - 29.0 mmol/L    Calcium 9.8 8.6 - 10.5 mg/dL    Total Protein 7.7 6.0 - 8.5  g/dL    Albumin 4.9 3.5 - 5.2 g/dL    ALT (SGPT) 18 1 - 33 U/L    AST (SGOT) 19 1 - 32 U/L    Alkaline Phosphatase 102 39 - 117 U/L    Total Bilirubin 1.3 (H) 0.0 - 1.2 mg/dL    Globulin 2.8 gm/dL    A/G Ratio 1.8 g/dL    BUN/Creatinine Ratio 13.9 7.0 - 25.0    Anion Gap 11.0 5.0 - 15.0 mmol/L    eGFR 60.0 (L) >60.0 mL/min/1.73   BNP    Collection Time: 11/17/23  6:12 PM    Specimen: Blood   Result Value Ref Range    proBNP 839.0 0.0 - 900.0 pg/mL   Single High Sensitivity Troponin T    Collection Time: 11/17/23  6:12 PM    Specimen: Blood   Result Value Ref Range    HS Troponin T 20 (H) <14 ng/L   Green Top (Gel)    Collection Time: 11/17/23  6:12 PM   Result Value Ref Range    Extra Tube Hold for add-ons.    Lavender Top    Collection Time: 11/17/23  6:12 PM   Result Value Ref Range    Extra Tube hold for add-on    Gold Top - SST    Collection Time: 11/17/23  6:12 PM   Result Value Ref Range    Extra Tube Hold for add-ons.    Light Blue Top    Collection Time: 11/17/23  6:12 PM   Result Value Ref Range    Extra Tube Hold for add-ons.    CBC Auto Differential    Collection Time: 11/17/23  6:12 PM    Specimen: Blood   Result Value Ref Range    WBC 18.15 (H) 3.40 - 10.80 10*3/mm3    RBC 4.53 3.77 - 5.28 10*6/mm3    Hemoglobin 13.0 12.0 - 15.9 g/dL    Hematocrit 39.1 34.0 - 46.6 %    MCV 86.3 79.0 - 97.0 fL    MCH 28.7 26.6 - 33.0 pg    MCHC 33.2 31.5 - 35.7 g/dL    RDW 13.3 12.3 - 15.4 %    RDW-SD 41.1 37.0 - 54.0 fl    MPV 10.5 6.0 - 12.0 fL    Platelets 211 140 - 450 10*3/mm3    Neutrophil % 88.7 (H) 42.7 - 76.0 %    Lymphocyte % 6.3 (L) 19.6 - 45.3 %    Monocyte % 4.1 (L) 5.0 - 12.0 %    Eosinophil % 0.2 (L) 0.3 - 6.2 %    Basophil % 0.2 0.0 - 1.5 %    Immature Grans % 0.5 0.0 - 0.5 %    Neutrophils, Absolute 16.09 (H) 1.70 - 7.00 10*3/mm3    Lymphocytes, Absolute 1.15 0.70 - 3.10 10*3/mm3    Monocytes, Absolute 0.75 0.10 - 0.90 10*3/mm3    Eosinophils, Absolute 0.03 0.00 - 0.40 10*3/mm3    Basophils, Absolute  0.04 0.00 - 0.20 10*3/mm3    Immature Grans, Absolute 0.09 (H) 0.00 - 0.05 10*3/mm3    nRBC 0.0 0.0 - 0.2 /100 WBC   Respiratory Panel PCR w/COVID-19(SARS-CoV-2) KAJAL/MARILIN/DEBBIE/PAD/COR/DAVID In-House, NP Swab in UTM/VTM, 2 HR TAT - Swab, Nasopharynx    Collection Time: 11/17/23  6:13 PM    Specimen: Nasopharynx; Swab   Result Value Ref Range    ADENOVIRUS, PCR Not Detected Not Detected    Coronavirus 229E Not Detected Not Detected    Coronavirus HKU1 Not Detected Not Detected    Coronavirus NL63 Not Detected Not Detected    Coronavirus OC43 Not Detected Not Detected    COVID19 Not Detected Not Detected - Ref. Range    Human Metapneumovirus Not Detected Not Detected    Human Rhinovirus/Enterovirus Not Detected Not Detected    Influenza A PCR Not Detected Not Detected    Influenza B PCR Not Detected Not Detected    Parainfluenza Virus 1 Not Detected Not Detected    Parainfluenza Virus 2 Not Detected Not Detected    Parainfluenza Virus 3 Not Detected Not Detected    Parainfluenza Virus 4 Not Detected Not Detected    RSV, PCR Not Detected Not Detected    Bordetella pertussis pcr Not Detected Not Detected    Bordetella parapertussis PCR Not Detected Not Detected    Chlamydophila pneumoniae PCR Not Detected Not Detected    Mycoplasma pneumo by PCR Not Detected Not Detected       Ordered the above labs and reviewed the results.        RADIOLOGY  XR Chest 1 View    Result Date: 11/17/2023  XR CHEST 1 VW-  HISTORY: Female who is 68 years-old, short of breath  TECHNIQUE: Frontal view of the chest  COMPARISON: 8/2/2023  FINDINGS: The heart size is normal. Aorta is calcified. Sternotomy wires are present. Pulmonary vasculature is unremarkable. No focal pulmonary consolidation, pleural effusion, or pneumothorax. Chronic parenchymal calcifications are seen. No acute osseous process.      No focal pulmonary consolidation. Follow-up/further evaluation can be obtained as clinical indications persist.  This report was finalized on  11/17/2023 6:46 PM by Dr. Chapincito Garcia M.D on Workstation: [id number]       Ordered the above noted radiological studies. Reviewed by me in PACS.            MEDICATIONS GIVEN IN ER  Medications   sodium chloride 0.9 % flush 10 mL (has no administration in time range)   levoFLOXacin (LEVAQUIN) 750 mg/150 mL D5W (premix) (LEVAQUIN) 750 mg (750 mg Intravenous New Bag 11/1953)   albuterol (PROVENTIL) nebulizer solution 0.083% 2.5 mg/3mL (2.5 mg Nebulization Given 11/17/23 1904)   methylPREDNISolone sodium succinate (SOLU-Medrol) injection 125 mg (125 mg Intravenous Given 11/17/23 1930)                   MEDICAL DECISION MAKING, PROGRESS, and CONSULTS    All labs have been independently reviewed by me.  All radiology studies have been reviewed by me and I have also reviewed the radiology report.   EKG's independently viewed and interpreted by me.  Discussion below represents my analysis of pertinent findings related to patient's condition, differential diagnosis, treatment plan and final disposition.      Additional sources:  - Discussed/ obtained information from independent historians: Family member at bedside    - External (non-ED) record review: DC summary from 3/29/2023 reviewed and notable for history of COPD, chronic respiratory failure, CHF, A-fib, mitral valve replacement who was admitted for shortness of breath patient treated with steroids and bronchodilators and eventually able to be discharged home    - Chronic or social conditions impacting care: COPD, CHF    - Shared decision making: Utilizing shared decision-making techniques we elected to proceed with inpatient management      Orders placed during this visit:  Orders Placed This Encounter   Procedures    Respiratory Panel PCR w/COVID-19(SARS-CoV-2) KAJAL/MARILIN/DEBBIE/PAD/COR/DAVID In-House, NP Swab in UTM/VTM, 2 HR TAT - Swab, Nasopharynx    Blood Culture - Blood,    Blood Culture - Blood,    XR Chest 1 View    CT Chest Without Contrast Diagnostic     [place] Draw    Comprehensive Metabolic Panel    BNP    Single High Sensitivity Troponin T    CBC Auto Differential    Lactic Acid, Plasma    Procalcitonin    NPO Diet NPO Type: Strict NPO    Undress & Gown    Continuous Pulse Oximetry    Vital Signs    [facility] (on-call MD unless specified) Details    Oxygen Therapy- Nasal Cannula; Titrate 1-6 LPM Per SpO2; 90 - 95%    ECG 12 Lead ED Triage Standing Order; SOA    Insert Peripheral IV    Initiate Observation Status    CBC & Differential    Green Top (Gel)    Lavender Top    Gold Top - SST    Light Blue Top       Differential diagnosis includes but is not limited to:    COPD exacerbation, CHF exacerbation, pneumonia      Independent interpretation of labs, radiology studies, and discussions with consultants:  ED Course as of 11/17/23 1956 Fri Nov 17, 2023   1800 EKG interpreted by me demonstrates atrial fibrillation, rate 95, no QT prolongation, no ST elevation [MW]   1827 WBC(!): 18.15 [MW]   1827 Chest x-ray interpreted by me demonstrates findings concerning for left lower lobe consolidation [MW]   1831 Leukocytosis noted in connection with vital sign abnormalities concerning for sepsis.  Blood cultures and lactic acid obtained and respiratory antibiotics initiated. [MW]   1953 Discussed with Gibson YATES w/ [facility] who agrees to admit under Dr. Gardner [MW]      ED Course User Index  [MW] Jose Luis Bella MD         DIAGNOSIS  Final diagnoses:   COPD exacerbation         DISPOSITION  ED Disposition       ED Disposition   Decision to Admit    Condition   --    Comment   Level of Care: Telemetry [5]   Diagnosis: COPD exacerbation [337606]   Admitting Physician: DEBBIE GARDNER [[id number]]   Attending Physician: DEBBIE GARDNER [[id number]]                           Latest Documented Vital Signs:  As of 19:56 EST  BP- 164/69 HR- 86 Temp- 96.2 °F (35.7 °C) (Tympanic) O2 sat- 100%              --    Please note that portions of this were completed with a voice recognition  program.       Note Disclaimer: At [facility], we believe that sharing information builds trust and better relationships. You are receiving this note because you are receiving care at [facility] or recently visited. It is possible you will see health information before a provider has talked with you about it. This kind of information can be easy to misunderstand. To help you fully understand what it means for your health, we urge you to discuss this note with your provider.             Jose Luis Bella MD  11/17/23 1956

## 2023-11-18 ENCOUNTER — READMISSION MANAGEMENT (OUTPATIENT)
Dept: CALL CENTER | Facility: HOSPITAL | Age: 68
End: 2023-11-18
Payer: MEDICARE

## 2023-11-18 VITALS
BODY MASS INDEX: 20.07 KG/M2 | HEIGHT: 67 IN | OXYGEN SATURATION: 95 % | DIASTOLIC BLOOD PRESSURE: 67 MMHG | WEIGHT: 127.9 LBS | TEMPERATURE: 98.1 F | RESPIRATION RATE: 16 BRPM | HEART RATE: 72 BPM | SYSTOLIC BLOOD PRESSURE: 159 MMHG

## 2023-11-18 LAB
ALBUMIN SERPL-MCNC: 4.2 G/DL (ref 3.5–5.2)
ALBUMIN/GLOB SERPL: 1.4 G/DL
ALP SERPL-CCNC: 90 U/L (ref 39–117)
ALT SERPL W P-5'-P-CCNC: 14 U/L (ref 1–33)
ANION GAP SERPL CALCULATED.3IONS-SCNC: 9.3 MMOL/L (ref 5–15)
AST SERPL-CCNC: 15 U/L (ref 1–32)
BASOPHILS # BLD AUTO: 0.01 10*3/MM3 (ref 0–0.2)
BASOPHILS NFR BLD AUTO: 0.1 % (ref 0–1.5)
BILIRUB SERPL-MCNC: 0.9 MG/DL (ref 0–1.2)
BUN SERPL-MCNC: 12 MG/DL (ref 8–23)
BUN/CREAT SERPL: 13.6 (ref 7–25)
CALCIUM SPEC-SCNC: 9.5 MG/DL (ref 8.6–10.5)
CHLORIDE SERPL-SCNC: 102 MMOL/L (ref 98–107)
CO2 SERPL-SCNC: 28.7 MMOL/L (ref 22–29)
CREAT SERPL-MCNC: 0.88 MG/DL (ref 0.57–1)
DEPRECATED RDW RBC AUTO: 38.9 FL (ref 37–54)
EGFRCR SERPLBLD CKD-EPI 2021: 70.8 ML/MIN/1.73
EOSINOPHIL # BLD AUTO: 0 10*3/MM3 (ref 0–0.4)
EOSINOPHIL NFR BLD AUTO: 0 % (ref 0.3–6.2)
ERYTHROCYTE [DISTWIDTH] IN BLOOD BY AUTOMATED COUNT: 13 % (ref 12.3–15.4)
GLOBULIN UR ELPH-MCNC: 2.9 GM/DL
GLUCOSE SERPL-MCNC: 156 MG/DL (ref 65–99)
HCT VFR BLD AUTO: 33.9 % (ref 34–46.6)
HGB BLD-MCNC: 11.5 G/DL (ref 12–15.9)
IMM GRANULOCYTES # BLD AUTO: 0.06 10*3/MM3 (ref 0–0.05)
IMM GRANULOCYTES NFR BLD AUTO: 0.4 % (ref 0–0.5)
LYMPHOCYTES # BLD AUTO: 0.61 10*3/MM3 (ref 0.7–3.1)
LYMPHOCYTES NFR BLD AUTO: 4.3 % (ref 19.6–45.3)
MCH RBC QN AUTO: 28.3 PG (ref 26.6–33)
MCHC RBC AUTO-ENTMCNC: 33.9 G/DL (ref 31.5–35.7)
MCV RBC AUTO: 83.3 FL (ref 79–97)
MONOCYTES # BLD AUTO: 0.11 10*3/MM3 (ref 0.1–0.9)
MONOCYTES NFR BLD AUTO: 0.8 % (ref 5–12)
NEUTROPHILS NFR BLD AUTO: 13.47 10*3/MM3 (ref 1.7–7)
NEUTROPHILS NFR BLD AUTO: 94.4 % (ref 42.7–76)
NRBC BLD AUTO-RTO: 0 /100 WBC (ref 0–0.2)
PLATELET # BLD AUTO: 184 10*3/MM3 (ref 140–450)
PMV BLD AUTO: 10.5 FL (ref 6–12)
POTASSIUM SERPL-SCNC: 3.9 MMOL/L (ref 3.5–5.2)
PROT SERPL-MCNC: 7.1 G/DL (ref 6–8.5)
QT INTERVAL: 364 MS
QTC INTERVAL: 458 MS
RBC # BLD AUTO: 4.07 10*6/MM3 (ref 3.77–5.28)
SODIUM SERPL-SCNC: 140 MMOL/L (ref 136–145)
WBC NRBC COR # BLD AUTO: 14.26 10*3/MM3 (ref 3.4–10.8)

## 2023-11-18 PROCEDURE — G0378 HOSPITAL OBSERVATION PER HR: HCPCS

## 2023-11-18 PROCEDURE — 94664 DEMO&/EVAL PT USE INHALER: CPT

## 2023-11-18 PROCEDURE — 85025 COMPLETE CBC W/AUTO DIFF WBC: CPT | Performed by: INTERNAL MEDICINE

## 2023-11-18 PROCEDURE — 94799 UNLISTED PULMONARY SVC/PX: CPT

## 2023-11-18 PROCEDURE — 63710000001 PREDNISONE PER 1 MG: Performed by: NURSE PRACTITIONER

## 2023-11-18 PROCEDURE — 25010000002 ONDANSETRON PER 1 MG: Performed by: NURSE PRACTITIONER

## 2023-11-18 PROCEDURE — 96375 TX/PRO/DX INJ NEW DRUG ADDON: CPT

## 2023-11-18 PROCEDURE — 94761 N-INVAS EAR/PLS OXIMETRY MLT: CPT

## 2023-11-18 PROCEDURE — 80053 COMPREHEN METABOLIC PANEL: CPT | Performed by: INTERNAL MEDICINE

## 2023-11-18 RX ORDER — AZITHROMYCIN 500 MG/1
500 TABLET, FILM COATED ORAL DAILY
Qty: 3 TABLET | Refills: 0 | Status: SHIPPED | OUTPATIENT
Start: 2023-11-18 | End: 2023-11-21

## 2023-11-18 RX ORDER — PREDNISONE 20 MG/1
40 TABLET ORAL
Qty: 10 TABLET | Refills: 0 | Status: SHIPPED | OUTPATIENT
Start: 2023-11-20 | End: 2023-11-23

## 2023-11-18 RX ADMIN — SERTRALINE 100 MG: 100 TABLET, FILM COATED ORAL at 08:27

## 2023-11-18 RX ADMIN — AMLODIPINE BESYLATE 10 MG: 10 TABLET ORAL at 08:26

## 2023-11-18 RX ADMIN — NYSTATIN 500000 UNITS: 100000 SUSPENSION ORAL at 08:26

## 2023-11-18 RX ADMIN — ASPIRIN 81 MG: 81 TABLET, COATED ORAL at 08:26

## 2023-11-18 RX ADMIN — GUAIFENESIN 1200 MG: 600 TABLET, EXTENDED RELEASE ORAL at 08:26

## 2023-11-18 RX ADMIN — LISINOPRIL 40 MG: 40 TABLET ORAL at 08:26

## 2023-11-18 RX ADMIN — FUROSEMIDE 40 MG: 40 TABLET ORAL at 08:26

## 2023-11-18 RX ADMIN — IPRATROPIUM BROMIDE 0.5 MG: 0.5 SOLUTION RESPIRATORY (INHALATION) at 11:41

## 2023-11-18 RX ADMIN — BUDESONIDE 0.5 MG: 0.5 INHALANT RESPIRATORY (INHALATION) at 08:21

## 2023-11-18 RX ADMIN — PANTOPRAZOLE SODIUM 40 MG: 40 TABLET, DELAYED RELEASE ORAL at 05:30

## 2023-11-18 RX ADMIN — CETIRIZINE HYDROCHLORIDE 10 MG: 10 TABLET ORAL at 08:26

## 2023-11-18 RX ADMIN — POTASSIUM CHLORIDE 20 MEQ: 750 TABLET, EXTENDED RELEASE ORAL at 08:27

## 2023-11-18 RX ADMIN — CARVEDILOL 6.25 MG: 6.25 TABLET, FILM COATED ORAL at 08:26

## 2023-11-18 RX ADMIN — ONDANSETRON 4 MG: 2 INJECTION INTRAMUSCULAR; INTRAVENOUS at 08:34

## 2023-11-18 RX ADMIN — ROFLUMILAST 500 MCG: 500 TABLET ORAL at 08:26

## 2023-11-18 RX ADMIN — IPRATROPIUM BROMIDE 0.5 MG: 0.5 SOLUTION RESPIRATORY (INHALATION) at 08:20

## 2023-11-18 RX ADMIN — ATORVASTATIN CALCIUM 40 MG: 20 TABLET, FILM COATED ORAL at 08:26

## 2023-11-18 RX ADMIN — MULTIPLE VITAMINS W/ MINERALS TAB 1 TABLET: TAB at 08:27

## 2023-11-18 RX ADMIN — PREDNISONE 40 MG: 20 TABLET ORAL at 08:27

## 2023-11-18 RX ADMIN — ALBUTEROL SULFATE 2.5 MG: 2.5 SOLUTION RESPIRATORY (INHALATION) at 03:17

## 2023-11-18 NOTE — DISCHARGE SUMMARY
Patient Name: Paz Browne  : 1955  MRN: [id number]    Date of Admission: 2023  Date of Discharge:  2023  Primary Care Physician: Javan Martinez MD      Chief Complaint:   Shortness of Breath, Cough, and Fever      Discharge Diagnoses     Active Hospital Problems    Diagnosis  POA    **COPD exacerbation [J44.1]  Yes    Chronic diastolic CHF (congestive heart failure) [I50.32]  Yes    Hyperlipidemia [E78.5]  Yes    Chronic hypoxemic respiratory failure [J96.11]  Yes    Hypertension [I10]  Yes    PAF (paroxysmal atrial fibrillation) [I48.0]  Yes      Resolved Hospital Problems   No resolved problems to display.        Admitting HPI     Ms. Browne is a 68 y.o. former smoker with a history of COPD, chronic respiratory failure with hypoxia, diastolic CHF, HLD, HTN, and paroxysmal atrial fibrillation that presents to [facility] complaining of worsening shortness of breath for the last 2 to 3 days.  Patient currently wears 2 L of supplemental O2 at home and reports that she has not had to increase her O2.  She endorses pleuritic chest pain and fevers.  She denies any chills.  She also endorses a dry nonproductive cough occasionally.  Chest x-ray obtained in the emergency department shows nothing acute.  Labs obtained show a white count of 18 and a lactate of 0.9.  She has been admitted to our service for further observation.     Hospital Course     Pt admitted for dyspnea.  She was treated for COPD exacerbation.  Initially was given a dose of antibiotics in the emergency department for suspected pneumonia.  She had a CT chest which was obtained, compared to prior CT in , which did not show any new airspace disease.  It did show redemonstration of a previous focus of consolidation which was less dense and smaller compared to prior (27 mm versus 20 mm).  Her symptoms were vastly improved morning after admission with breathing treatments and steroids.  WBC was elevated though  procalcitonin normal, and no clinical symptoms of pneumonia.  At this point I think is reasonable to treat her for a COPD exacerbation with short course of steroids and azithromycin for anti-inflammatory effects.  She will need to follow-up with her primary pulmonologist, Dr. Melo, as she has not seen them in some time.  She is very insistent on going home this morning, given her vast clinical improvement as well as being back to her baseline O2 without wheezing, I have no objection to discharge at this time with close follow-up.    Discharge Plan     COPD exacerbation  -Discharged with prednisone and azithromycin    Abnormal CT Chest  -continued yearly f/u as recommended by Radiology    Abnormal CT A/P  -CT A/P in June 2023 showed mild intrahepatic and extrahepatic biliary dilation increased in interim from prior imaging as well as dilation of proximal pancreatic duct and low-density lesion within uncinate process- recommendation at that time was not urgent MRCP given normal alkaline phosphatase and bilirubin.  These lab values continue to be normal, and she has no abdominal symptoms.  However did recommend that her PCP address this for further evaluation as needed to r/o pathologic process    Afib  -RC: Coreg  -AC: hx of GIB on warfarin; deferred per her Cardiologist    Day of Discharge     Physical Exam:  Temp:  [96.2 °F (35.7 °C)-99 °F (37.2 °C)] 98.1 °F (36.7 °C)  Heart Rate:  [] 123  Resp:  [18-22] 20  BP: (124-168)/() 159/67  Body mass index is 20.03 kg/m².  Physical Exam  Constitutional:       General: She is not in acute distress.     Appearance: Normal appearance. She is not toxic-appearing.   Cardiovascular:      Rate and Rhythm: Normal rate and regular rhythm.   Pulmonary:      Effort: Pulmonary effort is normal.   Abdominal:      General: Abdomen is flat. Bowel sounds are normal.      Palpations: Abdomen is soft.   Skin:     General: Skin is warm.   Neurological:      General: No focal  deficit present.      Mental Status: She is alert and oriented to person, place, and time.   Psychiatric:         Mood and Affect: Mood normal.         Behavior: Behavior normal.         Consultants     Consult Orders (all) (From admission, onward)       Start     Ordered    11/17/23 1930  [facility] (on-call MD unless specified) Details  Once        Specialty:  Hospitalist  Provider:  (Not yet assigned)    11/17/23 1929                  Procedures     * Surgery not found *      Imaging Results (All)       Procedure Component Value Units Date/Time    CT Chest Without Contrast Diagnostic [[id number]] Collected: 11/17/23 2054     Updated: 11/17/23 2104    Narrative:      CT CHEST WO CONTRAST DIAGNOSTIC-     Radiation dose reduction techniques were utilized, including automated  exposure control and exposure modulation based on body size.     Clinical: Suspected pneumonia     COMPARISON examination 10/17/2022     FINDINGS:  1. Again demonstrated within the right lower lobe costophrenic sulcus is  a nodular focus of consolidation which appears slightly less dense and  smaller compared to the previous examination. 20 mm transverse compared  to 27 mm previously. There is a new linear opacity within the periphery  of the right lower lobe, seen on image #51. Suspect focus of atelectasis  or scarring. There is extensive bleb and bullae formation with scar  formation as before. There is old granulomatous disease. There is  chronic parenchymal change. An area of infiltrate/consolidation at the  base of the right middle lobe on the prior examination has resolved in  the interim. No new area of airspace disease has developed.     2. Cardiac size within normal limits, no pericardial abnormality. There  is coronary artery calcification. Atherosclerotic calcification of a  normal diameter aorta. The esophagus is satisfactory in appearance.  Several small to mildly enlarged stable mediastinal lymph nodes again  seen. These demonstrated  better on the previous contrast-enhanced  examination.     3. Limited images through the upper abdomen are unremarkable.     This report was finalized on 11/17/2023 9:01 PM by Dr. Leonel Smith M.D on Workstation: [id number]       XR Chest 1 View [[id number]] Collected: 11/17/23 1832     Updated: 11/17/23 1849    Narrative:      XR CHEST 1 VW-     HISTORY: Female who is 68 years-old, short of breath     TECHNIQUE: Frontal view of the chest     COMPARISON: 8/2/2023     FINDINGS: The heart size is normal. Aorta is calcified. Sternotomy wires  are present. Pulmonary vasculature is unremarkable. No focal pulmonary  consolidation, pleural effusion, or pneumothorax. Chronic parenchymal  calcifications are seen. No acute osseous process.       Impression:      No focal pulmonary consolidation. Follow-up/further  evaluation can be obtained as clinical indications persist.     This report was finalized on 11/17/2023 6:46 PM by Dr. 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  · No hemodynamically significant stenosis of the superior mesenteric artery (SMA) is noted.    Results for orders placed during the hospital encounter of 02/16/22    Adult Transthoracic Echo Complete W/ Cont if Necessary Per Protocol    Interpretation Summary  · Calculated left ventricular EF = 54.8% Estimated left ventricular EF was in agreement with the calculated left ventricular EF. Left ventricular systolic function is normal. Septal wall motion is abnormal, consistent with a post-operative state. Normal left ventricular cavity size noted. Left ventricular wall thickness is consistent with mild concentric hypertrophy. Left ventricular diastolic function was indeterminate.  · The right ventricular cavity is mildly dilated. Normal right ventricular  "systolic function noted.  · The left atrial cavity is mildly dilated.  · Right atrium not well visualized.  · The aortic valve is abnormal in structure. There is mild to moderate thickening of the aortic valve. The aortic valve appears trileaflet. Trace aortic valve regurgitation is present. No hemodynamically significant aortic valve stenosis is present.  · No significant mitral valve regurgitation is present. There is a 31 mm, porcine, [employer] bioprosthetic mitral valve present. The mitral valve peak and mean gradients are within defined limits. The prosthetic mitral valve is grossly normal.  · Mild to moderate tricuspid valve regurgitation is present. Estimated right ventricular systolic pressure from tricuspid regurgitation is moderately elevated (45-55 mmHg). Calculated right ventricular systolic pressure from tricuspid regurgitation is 48.0 mmHg. No evidence of significant tricuspid valve stenosis is present. There is a tricuspid ring valve present.    Pertinent Labs     Results from last 7 days   Lab Units 11/18/23  0431 11/17/23  1812   WBC 10*3/mm3 14.26* 18.15*   HEMOGLOBIN g/dL 11.5* 13.0   PLATELETS 10*3/mm3 184 211     Results from last 7 days   Lab Units 11/18/23  0431 11/17/23  1812   SODIUM mmol/L 140 141   POTASSIUM mmol/L 3.9 3.7   CHLORIDE mmol/L 102 101   CO2 mmol/L 28.7 29.0   BUN mg/dL 12 14   CREATININE mg/dL 0.88 1.01*   GLUCOSE mg/dL 156* 116*   EGFR mL/min/1.73 70.8 60.0*     Results from last 7 days   Lab Units 11/18/23  0431 11/17/23  1812   ALBUMIN g/dL 4.2 4.9   BILIRUBIN mg/dL 0.9 1.3*   ALK PHOS U/L 90 102   AST (SGOT) U/L 15 19   ALT (SGPT) U/L 14 18     Results from last 7 days   Lab Units 11/18/23  0431 11/17/23  1812   CALCIUM mg/dL 9.5 9.8   ALBUMIN g/dL 4.2 4.9       Results from last 7 days   Lab Units 11/17/23  1812   HSTROP T ng/L 20*   PROBNP pg/mL 839.0           Invalid input(s): \"LDLCALC\"      Results from last 7 days   Lab Units 11/17/23  1813   COVID19  Not Detected "       Test Results Pending at Discharge     Pending Labs       Order Current Status    Blood Culture - Blood, Arm, Left In process    Blood Culture - Blood, Arm, Right In process            Discharge Details        Discharge Medications        New Medications        Instructions Start Date   azithromycin 500 MG tablet  Commonly known as: Zithromax   500 mg, Oral, Daily      predniSONE 20 MG tablet  Commonly known as: DELTASONE   40 mg, Oral, Daily With Breakfast   Start Date: November 20, 2023            Continue These Medications        Instructions Start Date   albuterol sulfate  (90 Base) MCG/ACT inhaler  Commonly known as: PROVENTIL HFA;VENTOLIN HFA;PROAIR HFA   2 puffs, Inhalation, Every 4 Hours PRN      amLODIPine 10 MG tablet  Commonly known as: NORVASC   TAKE 1 TABLET BY MOUTH EVERY DAY      aspirin 81 MG EC tablet   81 mg, Oral, Daily      atorvastatin 40 MG tablet  Commonly known as: LIPITOR   TAKE 1 TABLET BY MOUTH EVERY DAY      benzonatate 100 MG capsule  Commonly known as: TESSALON   TAKE 1 CAPSULE BY MOUTH 2 TIMES A DAY AS NEEDED FOR COUGH      budesonide 0.5 MG/2ML nebulizer solution  Commonly known as: PULMICORT   0.5 mg, Nebulization, 2 Times Daily - RT      carvedilol 6.25 MG tablet  Commonly known as: COREG   TAKE 1 TABLET BY MOUTH TWICE A DAY WITH MEALS      cyclobenzaprine 10 MG tablet  Commonly known as: FLEXERIL   TAKE 1 TABLET BY MOUTH EVERYDAY AT BEDTIME      fish oil 1000 MG capsule capsule   Oral, Nightly      furosemide 40 MG tablet  Commonly known as: LASIX   TAKE 1 TABLET BY MOUTH TWICE A DAY      HYDROcodone-acetaminophen 7.5-325 MG per tablet  Commonly known as: NORCO   1 tablet, Oral, Every 6 Hours PRN      ipratropium-albuterol 0.5-2.5 mg/3 ml nebulizer  Commonly known as: DUO-NEB   3 mL, Nebulization, Every 4 Hours PRN      lisinopril 40 MG tablet  Commonly known as: PRINIVIL,ZESTRIL   40 mg, Oral, Daily      loratadine 10 MG tablet  Commonly known as: CLARITIN   10 mg,  Oral, 2 times daily      Magnesium Oxide -Mg Supplement 400 (240 Mg) MG tablet   TAKE 1 TABLET BY MOUTH EVERY DAY      Multivital tablet  Generic drug: multivitamin with minerals   1 tablet, Oral, Daily      Nebulizer device   1 Units, Does not apply, 4 Times Daily PRN, J44.1 j20.8      nystatin 100,000 unit/mL suspension  Commonly known as: MYCOSTATIN   500,000 Units, Swish & Spit, 4 Times Daily, Retain in mouth as long as possible.      O2  Commonly known as: OXYGEN   2 L/min, Inhalation, Continuous      omeprazole 40 MG capsule  Commonly known as: priLOSEC   TAKE 1 CAPSULE BY MOUTH EVERY DAY      ondansetron 4 MG tablet  Commonly known as: ZOFRAN   4 mg, Oral, Every 12 Hours PRN      ondansetron ODT 4 MG disintegrating tablet  Commonly known as: ZOFRAN-ODT   4 mg, Translingual, Every 8 Hours PRN      polyethylene glycol packet  Commonly known as: MIRALAX   17 g, Oral, 2 Times Daily      potassium chloride 20 MEQ CR tablet  Commonly known as: K-DUR,KLOR-CON   1 po tid      roflumilast 500 MCG tablet tablet  Commonly known as: DALIRESP   500 mcg, Oral, Daily      rOPINIRole 2 MG tablet  Commonly known as: REQUIP   TAKE 1 TABLET BY MOUTH EVERY DAY AT NIGHT      sertraline 100 MG tablet  Commonly known as: ZOLOFT   TAKE 1 TABLET BY MOUTH EVERY DAY      Symjepi 0.3 MG/0.3ML solution prefilled syringe  Generic drug: EPINEPHrine   No dose, route, or frequency recorded.      zolpidem 10 MG tablet  Commonly known as: AMBIEN   10 mg, Oral, Nightly PRN             Stop These Medications      arformoterol 15 MCG/2ML nebulizer solution  Commonly known as: BROVANA     butalbital-acetaminophen-caffeine -40 MG per tablet  Commonly known as: FIORICET, ESGIC     fluconazole 100 MG tablet  Commonly known as: Diflucan     meclizine 25 MG tablet  Commonly known as: ANTIVERT              Allergies   Allergen Reactions    Bupropion Itching    Cephalexin Itching     Tolerated piperacillin/tazobactam, ampicillin, cefdinir, and  ceftriaxone    Metoprolol Itching       Discharge Disposition:  Home or Self Care      Discharge Diet:  Diet Order   Procedures    Diet: Cardiac Diets; Healthy Heart (2-3 Na+); Texture: Regular Texture (IDDSI 7); Fluid Consistency: Thin (IDDSI 0)       Discharge Activity:   Activity Instructions       Activity as Tolerated              CODE STATUS:    Code Status and Medical Interventions:   Ordered at: 11/17/23 2053     Code Status (Patient has no pulse and is not breathing):    CPR (Attempt to Resuscitate)     Medical Interventions (Patient has pulse or is breathing):    Full Support       No future appointments.   Follow-up Information       Javan Martinez MD .    Specialty: Internal Medicine  Contact information:  [address]  [place] [facility] [address]  [phone]               Ag Melo Jr., MD Follow up in 1 week(s).    Specialty: Pulmonary Disease  Contact information:  [address]  [facility] [id number] [phone]                             Time Spent on Discharge:  Greater than 30 minutes spent on discharge management including final examination, discussion of hospital stay and patient education, preparation of records, medication reconciliation, follow up planning      Sergei Burgess MD  [facility]  11/18/23  10:39 EST

## 2023-11-18 NOTE — PLAN OF CARE
Goal Outcome Evaluation:  Plan of Care Reviewed With: patient        Progress: no change  Outcome Evaluation: Pt admitted from ED to [facility] for COPD exacerbation. AOx4. VSS. 3L NC upon arrival. Weaned to home baseline of 2L once settled. Sats in high 90s, weaned down to room air at 0400, sats ranged from 88-95%, but stayed around 91% for the most part. No c/o pain. 1 breathing treatment requested overnight. Up with assist to BSC for void. No BM this shift. AM labs show WBC still elevated, but trending back down. Specimen cup at bedside awaiting sputum sample.

## 2023-11-18 NOTE — H&P
Patient Name:  Paz Browne  YOB: 1955  MRN:  [id number]  Admit Date:  11/17/2023  Patient Care Team:  Javan Martinez MD as PCP - General (Internal Medicine)  Javan Martinez MD as PCP - Family Medicine  Ag Melo Jr., MD as Consulting Physician (Pulmonary Disease)  Cleveland Devine MD as Consulting Physician (Gastroenterology)  Earnest Gan MD as Consulting Physician (Cardiology)  Javan Martinez MD Zhong, Wangjian, MD PhD as Consulting Physician (Hematology and Oncology)  Javan Martinez MD as Referring Physician (Internal Medicine)      Subjective   History Present Illness     Chief Complaint   Patient presents with    Shortness of Breath    Cough    Fever     History of Present Illness  Ms. Browne is a 68 y.o. former smoker with a history of COPD, chronic respiratory failure with hypoxia, diastolic CHF, HLD, HTN, and paroxysmal atrial fibrillation that presents to [facility] complaining of worsening shortness of breath for the last 2 to 3 days.  Patient currently wears 2 L of supplemental O2 at home and reports that she has not had to increase her O2.  She endorses pleuritic chest pain and fevers.  She denies any chills.  She also endorses a dry nonproductive cough occasionally.  Chest x-ray obtained in the emergency department shows nothing acute.  Labs obtained show a white count of 18 and a lactate of 0.9.  She has been admitted to our service for further observation.    Review of Systems   Constitutional:  Positive for fever. Negative for chills.   HENT:  Negative for congestion and rhinorrhea.    Eyes:  Negative for photophobia and visual disturbance.   Respiratory:  Positive for cough and shortness of breath.    Cardiovascular:  Negative for chest pain and palpitations.   Gastrointestinal:  Negative for constipation, diarrhea, nausea and vomiting.   Endocrine: Negative for cold intolerance and heat intolerance.   Genitourinary:  Negative for  difficulty urinating and dysuria.   Musculoskeletal:  Negative for gait problem and joint swelling.   Skin:  Negative for rash and wound.   Neurological:  Negative for dizziness, light-headedness and headaches.   Psychiatric/Behavioral:  Negative for sleep disturbance and suicidal ideas.         Personal History     Past Medical History:   Diagnosis Date    VAN (acute kidney injury)     Anemia     Asthma     Atrial flutter     cardioversion    Cataract     Celiac artery stenosis     Chronic respiratory failure with hypoxia     Colon polyp     COPD (chronic obstructive pulmonary disease)     GI bleed     Hiatal hernia     History of CHF (congestive heart failure)     due to MR    History of home oxygen therapy     3 lpm NC    History of mitral valve replacement with tissue graft     Hyperlipidemia     Hypertension     Infectious viral hepatitis     B    Intertrigo     Long term (current) use of anticoagulants     Mitral regurgitation     s/p tissue MVR    PAF (paroxysmal atrial fibrillation)     s/p MAZE    Pneumonia     Pulmonary hypertension     S/P TVR (tricuspid valve repair) 7/7/2016     Past Surgical History:   Procedure Laterality Date    CARDIAC CATHETERIZATION  09/01/2014    Right dominant systemt, normal coronary arteries.     CARDIAC CATHETERIZATION Left 6/10/2016    Procedure: Cardiac catheterization;  Surgeon: Sergei Hall MD;  Location: [facility] CATH INVASIVE LOCATION;  Service:     CARDIAC CATHETERIZATION N/A 6/10/2016    Procedure: Right Heart Cath;  Surgeon: Sergei Hall MD;  Location: [facility] CATH INVASIVE LOCATION;  Service:     CATARACT EXTRACTION      COLONOSCOPY      COLONOSCOPY N/A 8/4/2017    Procedure: COLONOSCOPY TO CECUM/TI WITH POLYPECTOMY ( COLD BX);  Surgeon: Cleveland Devine MD;  Location: [facility] ENDOSCOPY;  Service:     COLONOSCOPY N/A 8/10/2017    Procedure: COLONOSCOPY to cecum and TI with 2 clips placed at transverse;  Surgeon: Earnest PALOMO MD;  Location: [facility] ENDOSCOPY;  Service:      COLONOSCOPY N/A 2017    Procedure: COLONOSCOPY INTO CECUM WITH COLD POLYPECTOMIES;  Surgeon: Cleveland Devine MD;  Location: [facility] ENDOSCOPY;  Service:     COLONOSCOPY N/A 2021    Procedure: COLONOSCOPY to cecum;  Surgeon: Cleveland Devine MD;  Location: [facility] ENDOSCOPY;  Service: Gastroenterology;  Laterality: N/A;  Pre: Fe deficency anemia, h/x of polyps  Post: fair prep, normal    ENDOSCOPY N/A 2017    Procedure: ESOPHAGOGASTRODUODENOSCOPY;  Surgeon: Porsha Ruby MD;  Location: [facility] ENDOSCOPY;  Service:     ENDOSCOPY N/A 2017    Procedure: ESOPHAGOGASTRODUODENOSCOPY WITH BIOPSIES;  Surgeon: Cleveland Devine MD;  Location: [facility] ENDOSCOPY;  Service:     ENDOSCOPY N/A 2021    Procedure: ESOPHAGOGASTRODUODENOSCOPY  with biopsies;  Surgeon: Cleveland Devine MD;  Location: [facility] ENDOSCOPY;  Service: Gastroenterology;  Laterality: N/A;  Pre: Fe deficency anemia, nausea, heme positive stool   Post: gastritis, sloughing of distal esophagus mucosa    GALLBLADDER SURGERY      HEMORRHOIDECTOMY      HYSTERECTOMY      KIDNEY SURGERY  2013    Stent placement    MAZE PROCEDURE      MITRAL VALVE REPLACEMENT      TONSILLECTOMY      TRICUSPID VALVE REPLACEMENT       Family History   Adopted: Yes   Problem Relation Age of Onset    No Known Problems Mother     No Known Problems Father      Social History     Tobacco Use    Smoking status: Former     Packs/day: 3.00     Years: 54.00     Additional pack years: 0.00     Total pack years: 162.00     Types: Cigarettes     Quit date:      Years since quittin.8     Passive exposure: Past    Smokeless tobacco: Never    Tobacco comments:     caffeine - tea or mt dew    Vaping Use    Vaping Use: Never used   Substance Use Topics    Alcohol use: No    Drug use: No     No current facility-administered medications on file prior to encounter.     Current Outpatient Medications on File Prior to Encounter   Medication Sig Dispense Refill    albuterol  sulfate  (90 Base) MCG/ACT inhaler Inhale 2 puffs Every 4 (Four) Hours As Needed for Wheezing. 18 g 3    amLODIPine (NORVASC) 10 MG tablet TAKE 1 TABLET BY MOUTH EVERY DAY 90 tablet 1    aspirin 81 MG EC tablet Take 1 tablet by mouth Daily.      atorvastatin (LIPITOR) 40 MG tablet TAKE 1 TABLET BY MOUTH EVERY DAY 90 tablet 2    benzonatate (TESSALON) 100 MG capsule TAKE 1 CAPSULE BY MOUTH 2 TIMES A DAY AS NEEDED FOR COUGH 180 capsule 1    budesonide (PULMICORT) 0.5 MG/2ML nebulizer solution Take 2 mL by nebulization 2 (Two) Times a Day for 30 days. Indications: Chronic Obstructive Lung Disease, Chronic hypoxic respiratory failure 360 mL 3    butalbital-acetaminophen-caffeine (FIORICET, ESGIC) -40 MG per tablet Take 1 tablet by mouth Every 6 (Six) Hours As Needed for Headache. 4 tablet 0    carvedilol (COREG) 6.25 MG tablet TAKE 1 TABLET BY MOUTH TWICE A DAY WITH MEALS 180 tablet 2    cyclobenzaprine (FLEXERIL) 10 MG tablet TAKE 1 TABLET BY MOUTH EVERYDAY AT BEDTIME 90 tablet 3    fluconazole (Diflucan) 100 MG tablet Take 1 tablet by mouth Daily. Take 1 tablet today and repeat in 10 days if no improvement. 2 tablet 0    furosemide (LASIX) 40 MG tablet TAKE 1 TABLET BY MOUTH TWICE A  tablet 1    HYDROcodone-acetaminophen (NORCO) 7.5-325 MG per tablet Take 1 tablet by mouth Every 6 (Six) Hours As Needed for Moderate Pain (Chronic pain medicine for low back pain). 90 tablet 0    ipratropium-albuterol (DUO-NEB) 0.5-2.5 mg/3 ml nebulizer Take 3 mL by nebulization Every 4 (Four) Hours As Needed for Wheezing. 360 mL 3    lisinopril (PRINIVIL,ZESTRIL) 40 MG tablet Take 1 tablet by mouth Daily.      loratadine (CLARITIN) 10 MG tablet Take 1 tablet by mouth 2 (two) times a day.      Magnesium Oxide 400 (240 Mg) MG tablet TAKE 1 TABLET BY MOUTH EVERY DAY 90 tablet 1    meclizine (ANTIVERT) 25 MG tablet Take 1 tablet by mouth 3 (Three) Times a Day As Needed for Dizziness. prn 30 tablet 3    Multiple  Vitamins-Minerals (MULTIVITAL) tablet Take 1 tablet by mouth Daily.      Nebulizer device 1 Units 4 (Four) Times a Day As Needed (Wheezing). J44.1 j20.8 1 each 0    nystatin (MYCOSTATIN) 100,000 unit/mL suspension Swish and spit 5 mL 4 (Four) Times a Day. Retain in mouth as long as possible. 60 mL 0    O2 (OXYGEN) Inhale 2 L/min Continuous.      Omega-3 Fatty Acids (fish oil) 1000 MG capsule capsule Take  by mouth Every Night.      omeprazole (priLOSEC) 40 MG capsule TAKE 1 CAPSULE BY MOUTH EVERY DAY 90 capsule 1    ondansetron (ZOFRAN) 4 MG tablet Take 1 tablet by mouth Every 12 (Twelve) Hours As Needed for Nausea or Vomiting. 90 tablet 3    ondansetron ODT (ZOFRAN-ODT) 4 MG disintegrating tablet Place 1 tablet on the tongue Every 8 (Eight) Hours As Needed for Nausea or Vomiting. 10 tablet 0    polyethylene glycol (MIRALAX) packet Take 17 g by mouth 2 (Two) Times a Day.      potassium chloride (K-DUR,KLOR-CON) 20 MEQ CR tablet 1 po tid 60 tablet 0    roflumilast (DALIRESP) 500 MCG tablet tablet Take 1 tablet by mouth Daily. 90 tablet 1    rOPINIRole (REQUIP) 2 MG tablet TAKE 1 TABLET BY MOUTH EVERY DAY AT NIGHT 90 tablet 2    sertraline (ZOLOFT) 100 MG tablet TAKE 1 TABLET BY MOUTH EVERY DAY 90 tablet 1    Symjepi 0.3 MG/0.3ML solution prefilled syringe       zolpidem (AMBIEN) 10 MG tablet Take 1 tablet by mouth At Night As Needed for Sleep. 30 tablet 3    arformoterol (BROVANA) 15 MCG/2ML nebulizer solution Take 2 mL by nebulization 2 (Two) Times a Day. 120 mL 3     Allergies   Allergen Reactions    Bupropion Itching    Cephalexin Itching     Tolerated piperacillin/tazobactam, ampicillin, cefdinir, and ceftriaxone    Metoprolol Itching       Objective    Objective     Vital Signs  Temp:  [96.2 °F (35.7 °C)-99 °F (37.2 °C)] 99 °F (37.2 °C)  Heart Rate:  [81-97] 97  Resp:  [18-22] 18  BP: (124-168)/() 124/100  SpO2:  [90 %-100 %] 98 %  on  Flow (L/min):  [2-4] 4;   Device (Oxygen Therapy): nasal cannula  Body  mass index is 20.36 kg/m².    Physical Exam  Vitals and nursing note reviewed.   Constitutional:       General: She is not in acute distress.     Appearance: She is well-developed. She is not toxic-appearing.      Comments: Chronically ill-appearing   HENT:      Head: Normocephalic and atraumatic.   Eyes:      General: No scleral icterus.        Right eye: No discharge.         Left eye: No discharge.      Conjunctiva/sclera: Conjunctivae normal.   Neck:      Vascular: No JVD.   Cardiovascular:      Rate and Rhythm: Normal rate and regular rhythm.      Heart sounds: Normal heart sounds. No murmur heard.     No friction rub. No gallop.   Pulmonary:      Effort: Pulmonary effort is normal. No respiratory distress.      Breath sounds: Examination of the right-lower field reveals decreased breath sounds. Examination of the left-lower field reveals decreased breath sounds. Decreased breath sounds and wheezing (Expiratory) present. No rales.      Comments: 2.5 liters of supplemental O2  Abdominal:      General: Bowel sounds are normal. There is no distension.      Palpations: Abdomen is soft.      Tenderness: There is no abdominal tenderness. There is no guarding.   Musculoskeletal:         General: No tenderness or deformity. Normal range of motion.      Cervical back: Normal range of motion and neck supple.   Skin:     General: Skin is warm and dry.      Capillary Refill: Capillary refill takes less than 2 seconds.   Neurological:      Mental Status: She is alert and oriented to person, place, and time.   Psychiatric:         Behavior: Behavior normal.       Results Review:  I reviewed the patient's new clinical results.  Discussed with ED provider.    Lab Results (last 24 hours)       Procedure Component Value Units Date/Time    CBC & Differential [[id number]]  (Abnormal) Collected: 11/17/23 1812    Specimen: Blood Updated: 11/17/23 1826    Narrative:      The following orders were created for panel order CBC &  Differential.  Procedure                               Abnormality         Status                     ---------                               -----------         ------                     CBC Auto Differential[[id number]]        Abnormal            Final result                 Please view results for these tests on the individual orders.    Comprehensive Metabolic Panel [[id number]]  (Abnormal) Collected: 11/17/23 1812    Specimen: Blood Updated: 11/17/23 1847     Glucose 116 mg/dL      BUN 14 mg/dL      Creatinine 1.01 mg/dL      Sodium 141 mmol/L      Potassium 3.7 mmol/L      Chloride 101 mmol/L      CO2 29.0 mmol/L      Calcium 9.8 mg/dL      Total Protein 7.7 g/dL      Albumin 4.9 g/dL      ALT (SGPT) 18 U/L      AST (SGOT) 19 U/L      Alkaline Phosphatase 102 U/L      Total Bilirubin 1.3 mg/dL      Globulin 2.8 gm/dL      A/G Ratio 1.8 g/dL      BUN/Creatinine Ratio 13.9     Anion Gap 11.0 mmol/L      eGFR 60.0 mL/min/1.73     Narrative:      GFR Normal >60  Chronic Kidney Disease <60  Kidney Failure <15      BNP [[id number]]  (Normal) Collected: 11/17/23 1812    Specimen: Blood Updated: 11/17/23 1846     proBNP 839.0 pg/mL     Narrative:      This assay is used as an aid in the diagnosis of individuals suspected of having heart failure. It can be used as an aid in the diagnosis of acute decompensated heart failure (ADHF) in patients presenting with signs and symptoms of ADHF to the emergency department (ED). In addition, NT-proBNP of <300 pg/mL indicates ADHF is not likely.    Age Range Result Interpretation  NT-proBNP Concentration (pg/mL:      <50             Positive            >450                   Gray                 300-450                    Negative             <300    50-75           Positive            >900                  Gray                300-900                  Negative            <300      >75             Positive            >1800                  Gray                300-1800                   Negative            <300    Single High Sensitivity Troponin T [[id number]]  (Abnormal) Collected: 11/17/23 1812    Specimen: Blood Updated: 11/17/23 1847     HS Troponin T 20 ng/L     Narrative:      High Sensitive Troponin T Reference Range:  <14.0 ng/L- Negative Female for AMI  <22.0 ng/L- Negative Male for AMI  >=14 - Abnormal Female indicating possible myocardial injury.  >=22 - Abnormal Male indicating possible myocardial injury.   Clinicians would have to utilize clinical acumen, EKG, Troponin, and serial changes to determine if it is an Acute Myocardial Infarction or myocardial injury due to an underlying chronic condition.         CBC Auto Differential [[id number]]  (Abnormal) Collected: 11/17/23 1812    Specimen: Blood Updated: 11/17/23 1826     WBC 18.15 10*3/mm3      RBC 4.53 10*6/mm3      Hemoglobin 13.0 g/dL      Hematocrit 39.1 %      MCV 86.3 fL      MCH 28.7 pg      MCHC 33.2 g/dL      RDW 13.3 %      RDW-SD 41.1 fl      MPV 10.5 fL      Platelets 211 10*3/mm3      Neutrophil % 88.7 %      Lymphocyte % 6.3 %      Monocyte % 4.1 %      Eosinophil % 0.2 %      Basophil % 0.2 %      Immature Grans % 0.5 %      Neutrophils, Absolute 16.09 10*3/mm3      Lymphocytes, Absolute 1.15 10*3/mm3      Monocytes, Absolute 0.75 10*3/mm3      Eosinophils, Absolute 0.03 10*3/mm3      Basophils, Absolute 0.04 10*3/mm3      Immature Grans, Absolute 0.09 10*3/mm3      nRBC 0.0 /100 WBC     Procalcitonin [[id number]] Collected: 11/17/23 1812    Specimen: Blood Updated: 11/17/23 2027    Respiratory Panel PCR w/COVID-19(SARS-CoV-2) KAJAL/MARILIN/DEBBIE/PAD/COR/DAVID In-House, NP Swab in [facility], 2 HR TAT - Swab, Nasopharynx [[id number]]  (Normal) Collected: 11/17/23 1813    Specimen: Swab from Nasopharynx Updated: 11/17/23 1924     ADENOVIRUS, PCR Not Detected     Coronavirus 229E Not Detected     Coronavirus HKU1 Not Detected     Coronavirus NL63 Not Detected     Coronavirus OC43 Not Detected     COVID19 Not Detected     Human  Metapneumovirus Not Detected     Human Rhinovirus/Enterovirus Not Detected     Influenza A PCR Not Detected     Influenza B PCR Not Detected     Parainfluenza Virus 1 Not Detected     Parainfluenza Virus 2 Not Detected     Parainfluenza Virus 3 Not Detected     Parainfluenza Virus 4 Not Detected     RSV, PCR Not Detected     Bordetella pertussis pcr Not Detected     Bordetella parapertussis PCR Not Detected     Chlamydophila pneumoniae PCR Not Detected     Mycoplasma pneumo by PCR Not Detected    Narrative:      In the setting of a positive respiratory panel with a viral infection PLUS a negative procalcitonin without other underlying concern for bacterial infection, consider observing off antibiotics or discontinuation of antibiotics and continue supportive care. If the respiratory panel is positive for atypical bacterial infection (Bordetella pertussis, Chlamydophila pneumoniae, or Mycoplasma pneumoniae), consider antibiotic de-escalation to target atypical bacterial infection.    Blood Culture - Blood, Arm, Right [[id number]] Collected: 11/17/23 1843    Specimen: Blood from Arm, Right Updated: 11/17/23 1847    Blood Culture - Blood, Arm, Left [[id number]] Collected: 11/17/23 1952    Specimen: Blood from Arm, Left Updated: 11/17/23 1959    Lactic Acid, Plasma [[id number]]  (Normal) Collected: 11/17/23 1952    Specimen: Blood from Arm, Left Updated: 11/17/23 2057     Lactate 0.9 mmol/L             Imaging Results (Last 24 Hours)       Procedure Component Value Units Date/Time    CT Chest Without Contrast Diagnostic [[id number]] Collected: 11/17/23 2054     Updated: 11/17/23 2104    Narrative:      CT CHEST WO CONTRAST DIAGNOSTIC-     Radiation dose reduction techniques were utilized, including automated  exposure control and exposure modulation based on body size.     Clinical: Suspected pneumonia     COMPARISON examination 10/17/2022     FINDINGS:  1. Again demonstrated within the right lower lobe costophrenic sulcus  is  a nodular focus of consolidation which appears slightly less dense and  smaller compared to the previous examination. 20 mm transverse compared  to 27 mm previously. There is a new linear opacity within the periphery  of the right lower lobe, seen on image #51. Suspect focus of atelectasis  or scarring. There is extensive bleb and bullae formation with scar  formation as before. There is old granulomatous disease. There is  chronic parenchymal change. An area of infiltrate/consolidation at the  base of the right middle lobe on the prior examination has resolved in  the interim. No new area of airspace disease has developed.     2. Cardiac size within normal limits, no pericardial abnormality. There  is coronary artery calcification. Atherosclerotic calcification of a  normal diameter aorta. The esophagus is satisfactory in appearance.  Several small to mildly enlarged stable mediastinal lymph nodes again  seen. These demonstrated better on the previous contrast-enhanced  examination.     3. Limited images through the upper abdomen are unremarkable.     This report was finalized on 11/17/2023 9:01 PM by Dr. Leonel Smith M.D on Workstation: [id number]       XR Chest 1 View [[id number]] Collected: 11/17/23 1832     Updated: 11/17/23 1849    Narrative:      XR CHEST 1 VW-     HISTORY: Female who is 68 years-old, short of breath     TECHNIQUE: Frontal view of the chest     COMPARISON: 8/2/2023     FINDINGS: The heart size is normal. Aorta is calcified. Sternotomy wires  are present. Pulmonary vasculature is unremarkable. No focal pulmonary  consolidation, pleural effusion, or pneumothorax. Chronic parenchymal  calcifications are seen. No acute osseous process.       Impression:      No focal pulmonary consolidation. Follow-up/further  evaluation can be obtained as clinical indications persist.     This report was finalized on 11/17/2023 6:46 PM by Dr. Chapincito Garcia M.D on Workstation: [id number]                Results for orders placed during the hospital encounter of 02/16/22    Adult Transthoracic Echo Complete W/ Cont if Necessary Per Protocol    Interpretation Summary  · Calculated left ventricular EF = 54.8% Estimated left ventricular EF was in agreement with the calculated left ventricular EF. Left ventricular systolic function is normal. Septal wall motion is abnormal, consistent with a post-operative state. Normal left ventricular cavity size noted. Left ventricular wall thickness is consistent with mild concentric hypertrophy. Left ventricular diastolic function was indeterminate.  · The right ventricular cavity is mildly dilated. Normal right ventricular systolic function noted.  · The left atrial cavity is mildly dilated.  · Right atrium not well visualized.  · The aortic valve is abnormal in structure. There is mild to moderate thickening of the aortic valve. The aortic valve appears trileaflet. Trace aortic valve regurgitation is present. No hemodynamically significant aortic valve stenosis is present.  · No significant mitral valve regurgitation is present. There is a 31 mm, porcine, [employer] bioprosthetic mitral valve present. The mitral valve peak and mean gradients are within defined limits. The prosthetic mitral valve is grossly normal.  · Mild to moderate tricuspid valve regurgitation is present. Estimated right ventricular systolic pressure from tricuspid regurgitation is moderately elevated (45-55 mmHg). Calculated right ventricular systolic pressure from tricuspid regurgitation is 48.0 mmHg. No evidence of significant tricuspid valve stenosis is present. There is a tricuspid ring valve present.      ECG 12 Lead ED Triage Standing Order; SOA   Preliminary Result   HEART RATE= 95  bpm   RR Interval= 632  ms   ND Interval=   ms   P Horizontal Axis=   deg   P Front Axis=   deg   QRSD Interval= 98  ms   QT Interval= 364  ms   QTcB= 458  ms   QRS Axis= 50  deg   T Wave Axis= 56  deg   - ABNORMAL ECG  -   Atrial fibrillation   Minimal ST depression, inferior leads   Electronically Signed By:    Date and Time of Study: 2023-11-17 17:58:05           Assessment/Plan     Active Hospital Problems    Diagnosis  POA    **COPD exacerbation [J44.1]  Yes    Chronic diastolic CHF (congestive heart failure) [I50.32]  Yes    Hyperlipidemia [E78.5]  Yes    Chronic hypoxemic respiratory failure [J96.11]  Yes    Hypertension [I10]  Yes    PAF (paroxysmal atrial fibrillation) [I48.0]  Yes      Resolved Hospital Problems   No resolved problems to display.       Ms. Browne is a 68 y.o. former smoker with a history of COPD, PAF, chronic diastolic CHF, HTN, and HLD who presents with worsening dyspnea.    Acute on chronic respiratory failure secondary to COPD exacerbation  -Chest x-ray unremarkable.  Respiratory viral panel currently negative.  -CT of chest without contrast to rule out pneumonia.  Procalcitonin currently pending.  -Prednisone 40 mg daily  -Mucolytic every 12  -DuoNebs every 4 and as needed  -As needed antitussives  -Continue supplemental oxygen  -Pulmonary toileting  -Of note, she was given Levaquin in ED for sepsis.  She currently has a temperature of 99, lactate 0.9.  Procalcitonin is currently pending.  She does have a white count of 18.  Will await CT of chest and procalcitonin and hold antibiotics for now.    Paroxysmal atrial fibrillation  -Currently controlled, will resume antiarrhythmic once MAR has been reconciled by nursing.    Chronic diastolic congestive heart failure  -Compensated  -Daily weights  -Strict I's and O  -Resume home regimen    Essential hypertension  -Stable, resume home regimen once more has been reconciled by nurse    HLD  -Resume statin therapy      I discussed the patient's findings and my recommendations with patient and ED provider.    VTE Prophylaxis - SCDs.  Code Status - Full code.       BA Shepherd  [facility]  11/17/23  21:14 EST

## 2023-11-18 NOTE — ED NOTES
Nursing report ED to floor  Paz Browne  68 y.o.  female    HPI :   Chief Complaint   Patient presents with    Shortness of Breath    Cough    Fever       Admitting doctor:   No admitting provider for patient encounter.    Admitting diagnosis:   The encounter diagnosis was COPD exacerbation.    Code status:   Current Code Status       Date Active Code Status Order ID Comments User Context       Prior            Allergies:   Bupropion, Cephalexin, and Metoprolol    Isolation:   Enhanced Droplet/Contact     Intake and Output  No intake or output data in the 24 hours ending 11/1953    Weight:       11/17/23 1746   Weight: 59 kg (130 lb)       Most recent vitals:   Vitals:    11/17/23 1901 11/17/23 1904 11/17/23 1921 11/17/23 1929   BP: 149/62  164/69    BP Location:       Patient Position:       Pulse: 85 86 81 86   Resp:  22  18   Temp:       TempSrc:       SpO2: 100% 94% 100% 100%   Weight:       Height:           Active LDAs/IV Access:   Lines, Drains & Airways       Active LDAs       Name Placement date Placement time Site Days    Peripheral IV 11/17/23 1900 Right Antecubital 11/17/23 1900  Antecubital  less than 1                    Labs (abnormal labs have a star):   Labs Reviewed   COMPREHENSIVE METABOLIC PANEL - Abnormal; Notable for the following components:       Result Value    Glucose 116 (*)     Creatinine 1.01 (*)     Total Bilirubin 1.3 (*)     eGFR 60.0 (*)     All other components within normal limits    Narrative:     GFR Normal >60  Chronic Kidney Disease <60  Kidney Failure <15     SINGLE HSTROPONIN T - Abnormal; Notable for the following components:    HS Troponin T 20 (*)     All other components within normal limits    Narrative:     High Sensitive Troponin T Reference Range:  <14.0 ng/L- Negative Female for AMI  <22.0 ng/L- Negative Male for AMI  >=14 - Abnormal Female indicating possible myocardial injury.  >=22 - Abnormal Male indicating possible myocardial injury.   Clinicians  would have to utilize clinical acumen, EKG, Troponin, and serial changes to determine if it is an Acute Myocardial Infarction or myocardial injury due to an underlying chronic condition.        CBC WITH AUTO DIFFERENTIAL - Abnormal; Notable for the following components:    WBC 18.15 (*)     Neutrophil % 88.7 (*)     Lymphocyte % 6.3 (*)     Monocyte % 4.1 (*)     Eosinophil % 0.2 (*)     Neutrophils, Absolute 16.09 (*)     Immature Grans, Absolute 0.09 (*)     All other components within normal limits   RESPIRATORY PANEL PCR W/ COVID-19 (SARS-COV-2), NP SWAB IN UTM/VTP, 2 HR TAT - Normal    Narrative:     In the setting of a positive respiratory panel with a viral infection PLUS a negative procalcitonin without other underlying concern for bacterial infection, consider observing off antibiotics or discontinuation of antibiotics and continue supportive care. If the respiratory panel is positive for atypical bacterial infection (Bordetella pertussis, Chlamydophila pneumoniae, or Mycoplasma pneumoniae), consider antibiotic de-escalation to target atypical bacterial infection.   BNP (IN-HOUSE) - Normal    Narrative:     This assay is used as an aid in the diagnosis of individuals suspected of having heart failure. It can be used as an aid in the diagnosis of acute decompensated heart failure (ADHF) in patients presenting with signs and symptoms of ADHF to the emergency department (ED). In addition, NT-proBNP of <300 pg/mL indicates ADHF is not likely.    Age Range Result Interpretation  NT-proBNP Concentration (pg/mL:      <50             Positive            >450                   Gray                 300-450                    Negative             <300    50-75           Positive            >900                  Gray                300-900                  Negative            <300      >75             Positive            >1800                  Gray                300-1800                  Negative            <300    BLOOD CULTURE   BLOOD CULTURE   RAINBOW DRAW    Narrative:     The following orders were created for panel order [place] Draw.  Procedure                               Abnormality         Status                     ---------                               -----------         ------                     Green Top (Gel)[[id number]]                                  Final result               Lavender Top[[id number]]                                     Final result               Gold Top - SST[[id number]]                                   Final result               Light Blue Top[[id number]]                                   Final result                 Please view results for these tests on the individual orders.   LACTIC ACID, PLASMA   CBC AND DIFFERENTIAL    Narrative:     The following orders were created for panel order CBC & Differential.  Procedure                               Abnormality         Status                     ---------                               -----------         ------                     CBC Auto Differential[[id number]]        Abnormal            Final result                 Please view results for these tests on the individual orders.   GREEN TOP   LAVENDER TOP   GOLD TOP - SST   LIGHT BLUE TOP       EKG:   ECG 12 Lead ED Triage Standing Order; SOA   Preliminary Result   HEART RATE= 95  bpm   RR Interval= 632  ms   NJ Interval=   ms   P Horizontal Axis=   deg   P Front Axis=   deg   QRSD Interval= 98  ms   QT Interval= 364  ms   QTcB= 458  ms   QRS Axis= 50  deg   T Wave Axis= 56  deg   - ABNORMAL ECG -   Atrial fibrillation   Minimal ST depression, inferior leads   Electronically Signed By:    Date and Time of Study: 2023-11-17 17:58:05          Meds given in ED:   Medications   sodium chloride 0.9 % flush 10 mL (has no administration in time range)   levoFLOXacin (LEVAQUIN) 750 mg/150 mL D5W (premix) (LEVAQUIN) 750 mg (750 mg Intravenous New Bag 11/1955)   albuterol (PROVENTIL)  nebulizer solution 0.083% 2.5 mg/3mL (2.5 mg Nebulization Given 23 190)   methylPREDNISolone sodium succinate (SOLU-Medrol) injection 125 mg (125 mg Intravenous Given 23)       Imaging results:  XR Chest 1 View    Result Date: 2023  No focal pulmonary consolidation. Follow-up/further evaluation can be obtained as clinical indications persist.  This report was finalized on 2023 6:46 PM by Dr. Chapincito Garcia M.D on Workstation: [employer]       Ambulatory status:   - Stand-by assist; requires oxygen    Social issues:   Social History     Socioeconomic History    Marital status:     Number of children: 2   Tobacco Use    Smoking status: Former     Packs/day: 3.00     Years: 54.00     Additional pack years: 0.00     Total pack years: 162.00     Types: Cigarettes     Quit date:      Years since quittin.8     Passive exposure: Past    Smokeless tobacco: Never    Tobacco comments:     caffeine - tea or mt dew    Vaping Use    Vaping Use: Never used   Substance and Sexual Activity    Alcohol use: No    Drug use: No    Sexual activity: Defer       NIH Stroke Scale:       Irina Purcell RN  23 19:53 EST

## 2023-11-19 NOTE — OUTREACH NOTE
Prep Survey      Flowsheet Row Responses   [facility] facility patient discharged from? [facility]   Is LACE score < 7 ? No   Eligibility Readm Mgmt   Discharge diagnosis COPD exacerbation   Does the patient have one of the following disease processes/diagnoses(primary or secondary)? COPD   Does the patient have Home health ordered? No   Is there a DME ordered? No   Prep survey completed? Yes            LIAM CLINTON - Registered Nurse

## 2023-11-21 ENCOUNTER — READMISSION MANAGEMENT (OUTPATIENT)
Dept: CALL CENTER | Facility: HOSPITAL | Age: 68
End: 2023-11-21
Payer: MEDICARE

## 2023-11-21 NOTE — OUTREACH NOTE
COPD/PN Week 1 Survey      Flowsheet Row Responses   [facility] patient discharged from? [place]   Does the patient have one of the following disease processes/diagnoses(primary or secondary)? COPD   Week 1 attempt successful? Yes   Call start time 1328   Call end time 1330   Discharge diagnosis COPD exacerbation   Meds reviewed with patient/caregiver? Yes   Is the patient having any side effects they believe may be caused by any medication additions or changes? No   Does the patient have all medications ordered at discharge? Yes   Is the patient taking all medications as directed (includes completed medication regime)? Yes   Does the patient have a primary care provider?  Yes   Does the patient have an appointment with their PCP or specialist within 7 days of discharge? Yes   Has the patient kept scheduled appointments due by today? Yes   Pulse Ox monitoring Intermittent   Pulse Ox device source Patient, Hospital, Current Health   O2 Sat: education provided Sat levels, Monitoring frequency   Psychosocial issues? No   Did the patient receive a copy of their discharge instructions? Yes   Nursing interventions Reviewed instructions with patient   What is the patient's perception of their health status since discharge? Improving   Nursing Interventions Nurse provided patient education   Are the patient's immunizations up to date?  Yes   If the patient is a current smoker, are they able to teach back resources for cessation? Not a smoker   Is the patient/caregiver able to teach back the hierarchy of who to call/visit for symptoms/problems? PCP, Specialist, Home health nurse, Urgent Care, ED, 911 Yes   Is the patient able to teach back COPD zones? Yes   Nursing interventions Education provided on various zones   Patient reports what zone on this call? Green Zone   Green Zone Reports doing well, Breathing without shortness of breath, Usual activity and exercise level, Usual amounts of cough and phlegm/mucous,  Slept well last night, Appetite is good   Green Zone interventions: Take daily medications, At all times avoid cigarette smoking, vaping and inhaled irritants   Week 1 call completed? Yes   Call end time 1330            LIAM T - Registered Nurse

## 2023-11-22 LAB
BACTERIA SPEC AEROBE CULT: NORMAL
BACTERIA SPEC AEROBE CULT: NORMAL

## 2023-11-29 ENCOUNTER — READMISSION MANAGEMENT (OUTPATIENT)
Dept: CALL CENTER | Facility: HOSPITAL | Age: 68
End: 2023-11-29
Payer: MEDICARE

## 2023-11-29 NOTE — OUTREACH NOTE
COPD/PN Week 2 Survey      Flowsheet Row Responses   [facility] patient discharged from? [facility]   Does the patient have one of the following disease processes/diagnoses(primary or secondary)? COPD   Week 2 attempt successful? Yes   Call start time 1302   Call end time 1306   Meds reviewed with patient/caregiver? Yes   Is the patient taking all medications as directed (includes completed medication regime)? Yes   Comments regarding appointments Pt to call for f/u appointment.   Has the patient kept scheduled appointments due by today? N/A   Pulse Ox monitoring Intermittent   What is the patient's perception of their health status since discharge? Returned to baseline/stable   Is the patient able to teach back COPD zones? Yes   Patient reports what zone on this call? Green Zone   Green Zone Reports doing well   Green Zone interventions: Take daily medications, Use oxygen as prescribed   Week 2 call completed? Yes   Graduated Yes   Wrap up additional comments Spouse reports pt is at baseline. Pt does use continuous 02 for the past 12 years. Pt has not had pcp f/u die to there being rampant COVID and illness in their area. Spouse wishes to not take pt to pcp at this time. Spouse has no questions or needs at this time.   Call end time 1306            Amanda MCGOVERN - Registered Nurse

## 2023-12-13 RX ORDER — CARVEDILOL 6.25 MG/1
TABLET ORAL
Qty: 180 TABLET | Refills: 2 | Status: SHIPPED | OUTPATIENT
Start: 2023-12-13

## 2024-02-17 ENCOUNTER — APPOINTMENT (OUTPATIENT)
Dept: GENERAL RADIOLOGY | Facility: HOSPITAL | Age: 69
End: 2024-02-17
Payer: MEDICARE

## 2024-02-17 ENCOUNTER — HOSPITAL ENCOUNTER (EMERGENCY)
Facility: HOSPITAL | Age: 69
Discharge: HOME | End: 2024-02-17
Attending: EMERGENCY MEDICINE
Payer: MEDICARE

## 2024-02-17 VITALS
TEMPERATURE: 98.1 F | SYSTOLIC BLOOD PRESSURE: 142 MMHG | DIASTOLIC BLOOD PRESSURE: 64 MMHG | OXYGEN SATURATION: 96 % | HEART RATE: 66 BPM | RESPIRATION RATE: 20 BRPM | HEIGHT: 67 IN | BODY MASS INDEX: 19.46 KG/M2 | WEIGHT: 124 LBS

## 2024-02-17 DIAGNOSIS — J44.1 COPD EXACERBATION: Primary | ICD-10-CM

## 2024-02-17 DIAGNOSIS — Z86.79 HISTORY OF PULMONARY HYPERTENSION: ICD-10-CM

## 2024-02-17 DIAGNOSIS — Z86.79 HISTORY OF HYPERTENSION: ICD-10-CM

## 2024-02-17 LAB
ANION GAP SERPL CALCULATED.3IONS-SCNC: 12 MMOL/L (ref 5–15)
B PARAPERT DNA SPEC QL NAA+PROBE: NOT DETECTED
B PERT DNA SPEC QL NAA+PROBE: NOT DETECTED
BASOPHILS # BLD AUTO: 0.03 10*3/MM3 (ref 0–0.2)
BASOPHILS NFR BLD AUTO: 0.4 % (ref 0–1.5)
BUN SERPL-MCNC: 10 MG/DL (ref 8–23)
BUN/CREAT SERPL: 11.9 (ref 7–25)
C PNEUM DNA NPH QL NAA+NON-PROBE: NOT DETECTED
CALCIUM SPEC-SCNC: 10 MG/DL (ref 8.6–10.5)
CHLORIDE SERPL-SCNC: 100 MMOL/L (ref 98–107)
CO2 SERPL-SCNC: 29 MMOL/L (ref 22–29)
CREAT SERPL-MCNC: 0.84 MG/DL (ref 0.57–1)
D-LACTATE SERPL-SCNC: 1.7 MMOL/L (ref 0.5–2)
DEPRECATED RDW RBC AUTO: 37.8 FL (ref 37–54)
EGFRCR SERPLBLD CKD-EPI 2021: 74.9 ML/MIN/1.73
EOSINOPHIL # BLD AUTO: 0.21 10*3/MM3 (ref 0–0.4)
EOSINOPHIL NFR BLD AUTO: 2.5 % (ref 0.3–6.2)
ERYTHROCYTE [DISTWIDTH] IN BLOOD BY AUTOMATED COUNT: 12.4 % (ref 12.3–15.4)
FLUAV SUBTYP SPEC NAA+PROBE: NOT DETECTED
FLUBV RNA ISLT QL NAA+PROBE: NOT DETECTED
GLUCOSE SERPL-MCNC: 127 MG/DL (ref 65–99)
HADV DNA SPEC NAA+PROBE: NOT DETECTED
HCOV 229E RNA SPEC QL NAA+PROBE: NOT DETECTED
HCOV HKU1 RNA SPEC QL NAA+PROBE: NOT DETECTED
HCOV NL63 RNA SPEC QL NAA+PROBE: NOT DETECTED
HCOV OC43 RNA SPEC QL NAA+PROBE: NOT DETECTED
HCT VFR BLD AUTO: 36.1 % (ref 34–46.6)
HGB BLD-MCNC: 12 G/DL (ref 12–15.9)
HMPV RNA NPH QL NAA+NON-PROBE: NOT DETECTED
HPIV1 RNA ISLT QL NAA+PROBE: NOT DETECTED
HPIV2 RNA SPEC QL NAA+PROBE: NOT DETECTED
HPIV3 RNA NPH QL NAA+PROBE: NOT DETECTED
HPIV4 P GENE NPH QL NAA+PROBE: NOT DETECTED
IMM GRANULOCYTES # BLD AUTO: 0.02 10*3/MM3 (ref 0–0.05)
IMM GRANULOCYTES NFR BLD AUTO: 0.2 % (ref 0–0.5)
LYMPHOCYTES # BLD AUTO: 1.13 10*3/MM3 (ref 0.7–3.1)
LYMPHOCYTES NFR BLD AUTO: 13.4 % (ref 19.6–45.3)
M PNEUMO IGG SER IA-ACNC: NOT DETECTED
MAGNESIUM SERPL-MCNC: 1.9 MG/DL (ref 1.6–2.4)
MCH RBC QN AUTO: 28.2 PG (ref 26.6–33)
MCHC RBC AUTO-ENTMCNC: 33.2 G/DL (ref 31.5–35.7)
MCV RBC AUTO: 84.7 FL (ref 79–97)
MONOCYTES # BLD AUTO: 0.45 10*3/MM3 (ref 0.1–0.9)
MONOCYTES NFR BLD AUTO: 5.3 % (ref 5–12)
NEUTROPHILS NFR BLD AUTO: 6.6 10*3/MM3 (ref 1.7–7)
NEUTROPHILS NFR BLD AUTO: 78.2 % (ref 42.7–76)
NRBC BLD AUTO-RTO: 0 /100 WBC (ref 0–0.2)
NT-PROBNP SERPL-MCNC: 554 PG/ML (ref 0–900)
PLATELET # BLD AUTO: 237 10*3/MM3 (ref 140–450)
PMV BLD AUTO: 10.2 FL (ref 6–12)
POTASSIUM SERPL-SCNC: 3.6 MMOL/L (ref 3.5–5.2)
PROCALCITONIN SERPL-MCNC: 0.03 NG/ML (ref 0–0.25)
RBC # BLD AUTO: 4.26 10*6/MM3 (ref 3.77–5.28)
RHINOVIRUS RNA SPEC NAA+PROBE: NOT DETECTED
RSV RNA NPH QL NAA+NON-PROBE: NOT DETECTED
SARS-COV-2 RNA NPH QL NAA+NON-PROBE: NOT DETECTED
SODIUM SERPL-SCNC: 141 MMOL/L (ref 136–145)
TROPONIN T SERPL HS-MCNC: 17 NG/L
WBC NRBC COR # BLD AUTO: 8.44 10*3/MM3 (ref 3.4–10.8)

## 2024-02-17 PROCEDURE — 93010 ELECTROCARDIOGRAM REPORT: CPT | Performed by: INTERNAL MEDICINE

## 2024-02-17 PROCEDURE — 83605 ASSAY OF LACTIC ACID: CPT | Performed by: EMERGENCY MEDICINE

## 2024-02-17 PROCEDURE — 94799 UNLISTED PULMONARY SVC/PX: CPT

## 2024-02-17 PROCEDURE — 83735 ASSAY OF MAGNESIUM: CPT | Performed by: EMERGENCY MEDICINE

## 2024-02-17 PROCEDURE — 83880 ASSAY OF NATRIURETIC PEPTIDE: CPT | Performed by: EMERGENCY MEDICINE

## 2024-02-17 PROCEDURE — 85025 COMPLETE CBC W/AUTO DIFF WBC: CPT | Performed by: EMERGENCY MEDICINE

## 2024-02-17 PROCEDURE — 94640 AIRWAY INHALATION TREATMENT: CPT

## 2024-02-17 PROCEDURE — 84484 ASSAY OF TROPONIN QUANT: CPT | Performed by: EMERGENCY MEDICINE

## 2024-02-17 PROCEDURE — 93005 ELECTROCARDIOGRAM TRACING: CPT | Performed by: EMERGENCY MEDICINE

## 2024-02-17 PROCEDURE — 71045 X-RAY EXAM CHEST 1 VIEW: CPT

## 2024-02-17 PROCEDURE — 96374 THER/PROPH/DIAG INJ IV PUSH: CPT

## 2024-02-17 PROCEDURE — 80048 BASIC METABOLIC PNL TOTAL CA: CPT | Performed by: EMERGENCY MEDICINE

## 2024-02-17 PROCEDURE — 84145 PROCALCITONIN (PCT): CPT | Performed by: EMERGENCY MEDICINE

## 2024-02-17 PROCEDURE — 25010000002 METHYLPREDNISOLONE PER 125 MG: Performed by: EMERGENCY MEDICINE

## 2024-02-17 PROCEDURE — 0202U NFCT DS 22 TRGT SARS-COV-2: CPT | Performed by: EMERGENCY MEDICINE

## 2024-02-17 PROCEDURE — 99284 EMERGENCY DEPT VISIT MOD MDM: CPT

## 2024-02-17 RX ORDER — PREDNISONE 20 MG/1
TABLET ORAL
Qty: 15 TABLET | Refills: 0 | Status: SHIPPED | OUTPATIENT
Start: 2024-02-17 | End: 2024-02-28

## 2024-02-17 RX ORDER — METHYLPREDNISOLONE SODIUM SUCCINATE 125 MG/2ML
125 INJECTION, POWDER, LYOPHILIZED, FOR SOLUTION INTRAMUSCULAR; INTRAVENOUS ONCE
Status: COMPLETED | OUTPATIENT
Start: 2024-02-17 | End: 2024-02-17

## 2024-02-17 RX ORDER — IPRATROPIUM BROMIDE AND ALBUTEROL SULFATE 2.5; .5 MG/3ML; MG/3ML
3 SOLUTION RESPIRATORY (INHALATION) ONCE
Status: COMPLETED | OUTPATIENT
Start: 2024-02-17 | End: 2024-02-17

## 2024-02-17 RX ADMIN — METHYLPREDNISOLONE SODIUM SUCCINATE 125 MG: 125 INJECTION, POWDER, FOR SOLUTION INTRAMUSCULAR; INTRAVENOUS at 14:59

## 2024-02-17 RX ADMIN — IPRATROPIUM BROMIDE AND ALBUTEROL SULFATE 3 ML: .5; 3 SOLUTION RESPIRATORY (INHALATION) at 12:53

## 2024-02-17 NOTE — ED PROVIDER NOTES
EMERGENCY DEPARTMENT ENCOUNTER    Room Number:  10/10  PCP: Javan Martinez MD  Historian: Patient      HPI:  Chief Complaint: Shortness of breath  A complete HPI/ROS/PMH/PSH/SH/FH are unobtainable due to: None    Context: Paz Browne is a 68 y.o. female who presents to the ED via private vehicle from home c/o acute increased shortness of breath, cough, low-grade fevers up to 100.8 over the last 3 to 4 days.  No significant productive cough, has a history of COPD on 2 L nasal cannula chronically without increased recently.  No increased peripheral edema.  Not currently on any antibiotics or steroids.  States that when she has felt this way previously she has needed steroids and antibiotics.      MEDICAL RECORD REVIEW    External (non-ED) record review: Adult transthoracic echo February 16, 2022 shows ejection fraction of 54.8%              PAST MEDICAL HISTORY  Active Ambulatory Problems     Diagnosis Date Noted    PAF (paroxysmal atrial fibrillation) 01/25/2016    Hypertension     Hyperlipidemia     Pain medication agreement 05/04/2016    Hiatal hernia 05/04/2016    Primary osteoarthritis involving multiple joints 05/04/2016    Pulmonary hypertension     S/P TVR (tricuspid valve repair) 07/07/2016    Pulmonary nodule 10/13/2016    Restless leg syndrome 11/18/2016    Chronic low back pain 08/02/2017    Dysplastic polyp of colon 08/07/2017    History of mitral valve replacement with tissue graft 08/11/2017    Chronic hypoxemic respiratory failure 08/13/2017    Anemia 08/09/2017    Celiac artery stenosis 08/24/2017    Intertrigo 08/25/2017    Abnormal EKG 06/30/2019    Muscle spasms of both lower extremities 12/22/2020    Iron deficiency anemia 03/30/2021    Adenomatous polyp of colon 03/30/2021    Gastroesophageal reflux disease without esophagitis 03/30/2021    Headache 07/04/2021    History of endocarditis 02/03/2022    Pneumonia of both lower lobes due to infectious organism 10/16/2022    Peptic ulcer  disease 10/17/2022    Peripheral vascular disease 10/17/2022    Hyperlipidemia 10/17/2022    Hyperglycemia 10/17/2022    COPD with acute exacerbation 10/19/2022    Chronic diastolic CHF (congestive heart failure) 03/29/2023    Chronic bilateral low back pain 04/19/2023    COPD exacerbation 11/17/2023     Resolved Ambulatory Problems     Diagnosis Date Noted    Tachycardia     Renal failure     Palpitations     Mitral regurgitation     Heart failure 06/08/2016    Long term current use of anticoagulant 10/13/2016    Aspiration pneumonia 10/14/2016    Hemoptysis 10/14/2016    Lower GI bleed 08/09/2017    Precordial pain 08/11/2017    Oral candidiasis 08/14/2017    VAN (acute kidney injury) 08/15/2017    GI bleed 12/01/2017    Acute exacerbation of chronic obstructive pulmonary disease (COPD) 01/02/2018    Pneumonia due to infectious organism 07/18/2018    Insomnia due to medical condition 02/01/2019    COPD (chronic obstructive pulmonary disease) 06/30/2019    Shingles 04/08/2020    Vertigo 10/06/2020    Dark stools 03/30/2021    Acute hyperkalemia 05/10/2021    Tremor 05/10/2021    Anemia 05/10/2021    COPD exacerbation 06/14/2021    Septicemia 07/02/2021    Bacteremia 07/03/2021    Acute hypoxemic respiratory failure 10/17/2022    COPD (chronic obstructive pulmonary disease) 10/17/2022    Pulmonary nodule 10/17/2022    Hypokalemia 10/17/2022    Chronic respiratory failure 10/19/2022     Past Medical History:   Diagnosis Date    Asthma     Atrial flutter     Cataract     Chronic respiratory failure with hypoxia     Colon polyp     History of CHF (congestive heart failure)     History of home oxygen therapy     Infectious viral hepatitis     Long term (current) use of anticoagulants     Pneumonia          PAST SURGICAL HISTORY  Past Surgical History:   Procedure Laterality Date    CARDIAC CATHETERIZATION  09/01/2014    Right dominant systemt, normal coronary arteries.     CARDIAC CATHETERIZATION Left 6/10/2016     Procedure: Cardiac catheterization;  Surgeon: Sergei Hall MD;  Location: [facility] CATH INVASIVE LOCATION;  Service:     CARDIAC CATHETERIZATION N/A 6/10/2016    Procedure: Right Heart Cath;  Surgeon: Sergei Hall MD;  Location: [facility] CATH INVASIVE LOCATION;  Service:     CATARACT EXTRACTION      COLONOSCOPY      COLONOSCOPY N/A 8/4/2017    Procedure: COLONOSCOPY TO CECUM/TI WITH POLYPECTOMY ( COLD BX);  Surgeon: Cleveland Devine MD;  Location: [facility] ENDOSCOPY;  Service:     COLONOSCOPY N/A 8/10/2017    Procedure: COLONOSCOPY to cecum and TI with 2 clips placed at transverse;  Surgeon: Earnest PALOMO MD;  Location: [facility] ENDOSCOPY;  Service:     COLONOSCOPY N/A 12/22/2017    Procedure: COLONOSCOPY INTO CECUM WITH COLD POLYPECTOMIES;  Surgeon: Cleveland Devine MD;  Location: [facility] ENDOSCOPY;  Service:     COLONOSCOPY N/A 5/17/2021    Procedure: COLONOSCOPY to cecum;  Surgeon: Cleveland Devine MD;  Location: [facility] ENDOSCOPY;  Service: Gastroenterology;  Laterality: N/A;  Pre: Fe deficency anemia, h/x of polyps  Post: fair prep, normal    ENDOSCOPY N/A 8/17/2017    Procedure: ESOPHAGOGASTRODUODENOSCOPY;  Surgeon: Porsha Ruby MD;  Location: [facility] ENDOSCOPY;  Service:     ENDOSCOPY N/A 12/22/2017    Procedure: ESOPHAGOGASTRODUODENOSCOPY WITH BIOPSIES;  Surgeon: Cleveland Devine MD;  Location: [facility] ENDOSCOPY;  Service:     ENDOSCOPY N/A 5/17/2021    Procedure: ESOPHAGOGASTRODUODENOSCOPY  with biopsies;  Surgeon: Cleveland Devine MD;  Location: [facility] ENDOSCOPY;  Service: Gastroenterology;  Laterality: N/A;  Pre: Fe deficency anemia, nausea, heme positive stool   Post: gastritis, sloughing of distal esophagus mucosa    GALLBLADDER SURGERY      HEMORRHOIDECTOMY      HYSTERECTOMY      KIDNEY SURGERY  04/22/2013    Stent placement    MAZE PROCEDURE      MITRAL VALVE REPLACEMENT      TONSILLECTOMY      TRICUSPID VALVE REPLACEMENT           FAMILY HISTORY  Family History   Adopted: Yes   Problem Relation Age of Onset    No Known  Problems Mother     No Known Problems Father          SOCIAL HISTORY  Social History     Socioeconomic History    Marital status:     Number of children: 2   Tobacco Use    Smoking status: Former     Packs/day: 3.00     Years: 54.00     Additional pack years: 0.00     Total pack years: 162.00     Types: Cigarettes     Quit date:      Years since quittin.1     Passive exposure: Past    Smokeless tobacco: Never    Tobacco comments:     caffeine - tea or mt dew    Vaping Use    Vaping Use: Never used   Substance and Sexual Activity    Alcohol use: No    Drug use: No    Sexual activity: Defer         ALLERGIES  Bupropion, Cephalexin, and Metoprolol        REVIEW OF SYSTEMS  Review of Systems     All systems reviewed and negative except for those discussed in HPI.       PHYSICAL EXAM    I have reviewed the triage vital signs and nursing notes.    ED Triage Vitals   Temp Heart Rate Resp BP SpO2   24 1147 24 1146 24 1147 24 1154 24 1146   98.1 °F (36.7 °C) 84 20 (!) 196/132 90 %      Temp src Heart Rate Source Patient Position BP Location FiO2 (%)   24 1147 -- 24 1155 24 1155 --   Oral  Lying Left arm        Physical Exam  General: No acute distress, nontoxic  HEENT: Mucous membranes moist, atraumatic, EOMI  Neck: Full ROM  Pulm: Symmetric chest rise, nonlabored, lungs diminished bilaterally with scattered and expiratory wheezes  Cardiovascular: Regular rate and rhythm, intact distal pulses, no peripheral edema  GI: Soft, nontender, nondistended, no rebound, no guarding, bowel sounds present  MSK: Full ROM, no deformity  Skin: Warm, dry  Neuro: Awake, alert, oriented x 4, GCS 15, moving all extremities, no focal deficits  Psych: Calm, cooperative      I wore an N95 mask, eye protection, and gloves used during this encounter.       LAB RESULTS  Recent Results (from the past 24 hour(s))   ECG 12 Lead Dyspnea    Collection Time: 24 12:02 PM   Result Value  Ref Range    QT Interval 416 ms    QTC Interval 465 ms   Respiratory Panel PCR w/COVID-19(SARS-CoV-2) KAJAL/MARILIN/DEBBIE/PAD/COR/DAVID In-House, NP Swab in UTM/VTM, 2 HR TAT - Swab, Nasopharynx    Collection Time: 02/17/24 12:20 PM    Specimen: Nasopharynx; Swab   Result Value Ref Range    ADENOVIRUS, PCR Not Detected Not Detected    Coronavirus 229E Not Detected Not Detected    Coronavirus HKU1 Not Detected Not Detected    Coronavirus NL63 Not Detected Not Detected    Coronavirus OC43 Not Detected Not Detected    COVID19 Not Detected Not Detected - Ref. Range    Human Metapneumovirus Not Detected Not Detected    Human Rhinovirus/Enterovirus Not Detected Not Detected    Influenza A PCR Not Detected Not Detected    Influenza B PCR Not Detected Not Detected    Parainfluenza Virus 1 Not Detected Not Detected    Parainfluenza Virus 2 Not Detected Not Detected    Parainfluenza Virus 3 Not Detected Not Detected    Parainfluenza Virus 4 Not Detected Not Detected    RSV, PCR Not Detected Not Detected    Bordetella pertussis pcr Not Detected Not Detected    Bordetella parapertussis PCR Not Detected Not Detected    Chlamydophila pneumoniae PCR Not Detected Not Detected    Mycoplasma pneumo by PCR Not Detected Not Detected   Basic Metabolic Panel    Collection Time: 02/17/24 12:34 PM    Specimen: Blood   Result Value Ref Range    Glucose 127 (H) 65 - 99 mg/dL    BUN 10 8 - 23 mg/dL    Creatinine 0.84 0.57 - 1.00 mg/dL    Sodium 141 136 - 145 mmol/L    Potassium 3.6 3.5 - 5.2 mmol/L    Chloride 100 98 - 107 mmol/L    CO2 29.0 22.0 - 29.0 mmol/L    Calcium 10.0 8.6 - 10.5 mg/dL    BUN/Creatinine Ratio 11.9 7.0 - 25.0    Anion Gap 12.0 5.0 - 15.0 mmol/L    eGFR 74.9 >60.0 mL/min/1.73   BNP    Collection Time: 02/17/24 12:34 PM    Specimen: Blood   Result Value Ref Range    proBNP 554.0 0.0 - 900.0 pg/mL   High Sensitivity Troponin T    Collection Time: 02/17/24 12:34 PM    Specimen: Blood   Result Value Ref Range    HS Troponin T 17 (H)  <14 ng/L   Lactic Acid, Plasma    Collection Time: 02/17/24 12:34 PM    Specimen: Blood   Result Value Ref Range    Lactate 1.7 0.5 - 2.0 mmol/L   Procalcitonin    Collection Time: 02/17/24 12:34 PM    Specimen: Blood   Result Value Ref Range    Procalcitonin 0.03 0.00 - 0.25 ng/mL   Magnesium    Collection Time: 02/17/24 12:34 PM    Specimen: Blood   Result Value Ref Range    Magnesium 1.9 1.6 - 2.4 mg/dL   CBC Auto Differential    Collection Time: 02/17/24 12:34 PM    Specimen: Blood   Result Value Ref Range    WBC 8.44 3.40 - 10.80 10*3/mm3    RBC 4.26 3.77 - 5.28 10*6/mm3    Hemoglobin 12.0 12.0 - 15.9 g/dL    Hematocrit 36.1 34.0 - 46.6 %    MCV 84.7 79.0 - 97.0 fL    MCH 28.2 26.6 - 33.0 pg    MCHC 33.2 31.5 - 35.7 g/dL    RDW 12.4 12.3 - 15.4 %    RDW-SD 37.8 37.0 - 54.0 fl    MPV 10.2 6.0 - 12.0 fL    Platelets 237 140 - 450 10*3/mm3    Neutrophil % 78.2 (H) 42.7 - 76.0 %    Lymphocyte % 13.4 (L) 19.6 - 45.3 %    Monocyte % 5.3 5.0 - 12.0 %    Eosinophil % 2.5 0.3 - 6.2 %    Basophil % 0.4 0.0 - 1.5 %    Immature Grans % 0.2 0.0 - 0.5 %    Neutrophils, Absolute 6.60 1.70 - 7.00 10*3/mm3    Lymphocytes, Absolute 1.13 0.70 - 3.10 10*3/mm3    Monocytes, Absolute 0.45 0.10 - 0.90 10*3/mm3    Eosinophils, Absolute 0.21 0.00 - 0.40 10*3/mm3    Basophils, Absolute 0.03 0.00 - 0.20 10*3/mm3    Immature Grans, Absolute 0.02 0.00 - 0.05 10*3/mm3    nRBC 0.0 0.0 - 0.2 /100 WBC       Ordered the above labs and independently interpreted results. My findings will be discussed in the medical decision making section below        RADIOLOGY  XR Chest 1 View    Result Date: 2/17/2024  XR CHEST 1 VW-   INDICATION: Shortness of breath, cough, fever  COMPARISON: Chest radiograph November 17, 2023  TECHNIQUE: 1 view chest  FINDINGS:  Increased lung markings. Stable calcifications in the upper lobe apexes. No opacities. No effusions. Stable mediastinum. CABG. Median sternotomy. Cholecystectomy clips.      Obstructive airways  disease.  This report was finalized on 2/17/2024 12:16 PM by Dr. Earnest Mercer M.D on Workstation: [id number]       Ordered the above noted radiological studies.  Independently interpreted by me and my independent review of findings can be found in the ED Course.  See dictation for official radiology interpretation.      PROCEDURES    Procedures      EKG - Per my independent interpretation at 1208:    EKG Time: 1202  Rhythm/Rate: Sinus rhythm with a rate of 75  Normal axis  Normal intervals  Nonspecific T wave abnormalities   No STEMI       No emergent changes compared to November 17, 2023      MEDICATIONS GIVEN IN ER    Medications   ipratropium-albuterol (DUO-NEB) nebulizer solution 3 mL (3 mL Nebulization Given 2/17/24 1253)   methylPREDNISolone sodium succinate (SOLU-Medrol) injection 125 mg (125 mg Intravenous Given 2/17/24 1459)         PROGRESS, DATA ANALYSIS, CONSULTS, AND MEDICAL DECISION MAKING    Please note that this section constitutes my independent interpretation of clinical data including lab results, radiology, EKG's.  This constitutes my independent professional opinion regarding differential diagnosis and management of this patient.  It may include any factors such as history from outside sources, review of external records, social determinants of health, management of medications, response to those treatments, and discussions with other providers.    Differential Diagnosis and Plan: Initial concern for viral process, community-acquired pneumonia, COPD exacerbation, heart failure, renal failure, electrolyte abnormalities, anemia, among others.  Plan for labs, chest x-ray, EKG, supportive care, and a reevaluation with results.    Additional sources:  - Discussed/ obtained information from independent historians:       - (Social Determinants of Health): None     - Shared decision making:  Patient and family at bedside fully updated on and in agreement with the course and plan moving  forward    ED Course as of 02/17/24 1514   Sat Feb 17, 2024   1210 XR Chest 1 View  Per my independent interpretation of the chest x-ray, no overtly obvious pneumothorax or pneumonia [DC]   1316 WBC: 8.44 [DC]   1316 Hemoglobin: 12.0 [DC]   1316 Lactate: 1.7 [DC]   1316 Magnesium: 1.9 [DC]   1316 HS Troponin T(!): 17 [DC]   1316 Glucose(!): 127 [DC]   1316 BUN: 10 [DC]   1316 Creatinine: 0.84 [DC]   1316 Sodium: 141 [DC]   1316 Potassium: 3.6 [DC]   1316 proBNP: 554.0 [DC]   1316 Procalcitonin: 0.03 [DC]   1408 Respiratory Panel PCR w/COVID-19(SARS-CoV-2) KAJAL/MARILIN/DEBBIE/PAD/COR/DAVID In-House, NP Swab in UTM/VTM, 2 HR TAT - Swab, Nasopharynx  Pan-negative [DC]   1439 On reevaluation patient is well-appearing in no acute distress, reassuring workup today, safe for discharge with what I suspect is a COPD exacerbation.  They are comfortable with plan for outpatient steroids and PCP/pulmonary follow-up.  ED return for worsening symptoms as needed. [DC]      ED Course User Index  [DC] Gavino Esteban MD       Hospitalization Considered?:     Orders Placed During This Visit:  Orders Placed This Encounter   Procedures    Respiratory Panel PCR w/COVID-19(SARS-CoV-2) KAJAL/MARILIN/DEBBIE/PAD/COR/DAVID In-House, NP Swab in UTM/VTM, 2 HR TAT - Swab, Nasopharynx    XR Chest 1 View    Basic Metabolic Panel    BNP    High Sensitivity Troponin T    Lactic Acid, Plasma    Procalcitonin    Magnesium    CBC Auto Differential    ECG 12 Lead Dyspnea    CBC & Differential       Additional orders considered but not placed:      Independent interpretation of labs, radiology studies, and discussions with consultants: See ED Course        AS OF 15:14 EST VITALS:    BP - 142/64  HR - 66  TEMP - 98.1 °F (36.7 °C) (Oral)  02 SATS - 96%          DIAGNOSIS  Final diagnoses:   COPD exacerbation   History of hypertension   History of pulmonary hypertension         DISPOSITION  ED Disposition       ED Disposition   Discharge    Condition   Stable    Comment   --                 Please note that portions of this document were completed with a voice recognition program.    Note Disclaimer: At [facility], we believe that sharing information builds trust and better relationships. You are receiving this note because you recently visited [facility]. It is possible you will see health information before a provider has talked with you about it. This kind of information can be easy to misunderstand. To help you fully understand what it means for your health, we urge you to discuss this note with your provider.                       Gavino Esteban MD  02/17/24 1516

## 2024-02-17 NOTE — ED TRIAGE NOTES
Patient to ED per PV from home w/ reports of increasing SOA, cough, fever over the last 3-4 days. Patient placed in mask.

## 2024-02-17 NOTE — DISCHARGE INSTRUCTIONS
Take steroids as prescribed, continue home current medications, close PCP and pulmonary follow-up as discussed, ED return for worsening symptoms as needed.

## 2024-02-18 LAB
QT INTERVAL: 416 MS
QTC INTERVAL: 465 MS

## 2024-03-01 NOTE — PROGRESS NOTES
Addended by: MAXI BALBUENA on: 3/1/2024 09:46 AM     Modules accepted: Orders     Subjective   Paz Browne is a 63 y.o. female.     History of Present Illness   Patient was seen for follow-up on pneumonia.  Patient is doing extremely well and is finished her antibiotics.  He is no longer coughing up sputum and her wheezing is been well-controlled with her bronchodilators.  She will follow-up in 3 months.    Dictated utilizing Dragon dictation. If there are questions or for further clarification, please contact me.   The following portions of the patient's history were reviewed and updated as appropriate: allergies, current medications, past family history, past medical history, past social history, past surgical history and problem list.    Review of Systems   Constitutional: Negative for fatigue and fever.   HENT: Positive for congestion. Negative for trouble swallowing.    Eyes: Negative for discharge and visual disturbance.   Respiratory: Positive for cough. Negative for choking, shortness of breath and wheezing.    Cardiovascular: Negative for chest pain and palpitations.   Gastrointestinal: Negative for abdominal pain and blood in stool.   Endocrine: Negative.    Genitourinary: Negative for genital sores and hematuria.   Musculoskeletal: Negative for gait problem and joint swelling.   Skin: Negative for color change, pallor, rash and wound.   Allergic/Immunologic: Positive for environmental allergies. Negative for immunocompromised state.   Neurological: Negative for facial asymmetry and speech difficulty.   Psychiatric/Behavioral: Negative for hallucinations and suicidal ideas.       Objective   Physical Exam   Constitutional: She is oriented to person, place, and time. She appears well-developed and well-nourished.   HENT:   Head: Normocephalic.   Eyes: Pupils are equal, round, and reactive to light. Conjunctivae are normal.   Neck: Normal range of motion. Neck supple.   Cardiovascular: Normal rate, regular rhythm and normal heart sounds.    Pulmonary/Chest: Effort normal and breath  sounds normal. No respiratory distress. She has no wheezes. She has no rales. She exhibits no tenderness.   Abdominal: Soft. Bowel sounds are normal.   Musculoskeletal: Normal range of motion.   Neurological: She is alert and oriented to person, place, and time.   Skin: Skin is warm and dry.   Psychiatric: She has a normal mood and affect. Her behavior is normal. Judgment and thought content normal.   Nursing note and vitals reviewed.      Assessment/Plan   Problems Addressed this Visit        Respiratory    Pneumonia due to infectious organism - Primary

## 2024-03-02 ENCOUNTER — HOSPITAL ENCOUNTER (OUTPATIENT)
Facility: HOSPITAL | Age: 69
Setting detail: OBSERVATION
Discharge: HOME | End: 2024-03-06
Attending: EMERGENCY MEDICINE | Admitting: STUDENT IN AN ORGANIZED HEALTH CARE EDUCATION/TRAINING PROGRAM
Payer: MEDICARE

## 2024-03-02 ENCOUNTER — APPOINTMENT (OUTPATIENT)
Dept: CT IMAGING | Facility: HOSPITAL | Age: 69
End: 2024-03-02
Payer: MEDICARE

## 2024-03-02 ENCOUNTER — APPOINTMENT (OUTPATIENT)
Dept: GENERAL RADIOLOGY | Facility: HOSPITAL | Age: 69
End: 2024-03-02
Payer: MEDICARE

## 2024-03-02 DIAGNOSIS — J44.1 COPD WITH ACUTE EXACERBATION: ICD-10-CM

## 2024-03-02 DIAGNOSIS — J18.9 COMMUNITY ACQUIRED PNEUMONIA, UNSPECIFIED LATERALITY: Primary | ICD-10-CM

## 2024-03-02 PROBLEM — D72.829 LEUKOCYTOSIS: Status: ACTIVE | Noted: 2024-03-02

## 2024-03-02 PROBLEM — R79.89 ELEVATED TROPONIN: Status: ACTIVE | Noted: 2024-03-02

## 2024-03-02 LAB
ALBUMIN SERPL-MCNC: 3.9 G/DL (ref 3.5–5.2)
ALBUMIN/GLOB SERPL: 1.6 G/DL
ALP SERPL-CCNC: 94 U/L (ref 39–117)
ALT SERPL W P-5'-P-CCNC: 11 U/L (ref 1–33)
ANION GAP SERPL CALCULATED.3IONS-SCNC: 11.6 MMOL/L (ref 5–15)
AST SERPL-CCNC: 14 U/L (ref 1–32)
B PARAPERT DNA SPEC QL NAA+PROBE: NOT DETECTED
B PERT DNA SPEC QL NAA+PROBE: NOT DETECTED
BASOPHILS # BLD AUTO: 0.03 10*3/MM3 (ref 0–0.2)
BASOPHILS NFR BLD AUTO: 0.2 % (ref 0–1.5)
BILIRUB SERPL-MCNC: 0.7 MG/DL (ref 0–1.2)
BUN SERPL-MCNC: 11 MG/DL (ref 8–23)
BUN/CREAT SERPL: 13.6 (ref 7–25)
C PNEUM DNA NPH QL NAA+NON-PROBE: NOT DETECTED
CALCIUM SPEC-SCNC: 8.6 MG/DL (ref 8.6–10.5)
CHLORIDE SERPL-SCNC: 99 MMOL/L (ref 98–107)
CO2 SERPL-SCNC: 29.4 MMOL/L (ref 22–29)
CREAT SERPL-MCNC: 0.81 MG/DL (ref 0.57–1)
D-LACTATE SERPL-SCNC: 1.1 MMOL/L (ref 0.5–2)
DEPRECATED RDW RBC AUTO: 38 FL (ref 37–54)
EGFRCR SERPLBLD CKD-EPI 2021: 78.2 ML/MIN/1.73
EOSINOPHIL # BLD AUTO: 0.34 10*3/MM3 (ref 0–0.4)
EOSINOPHIL NFR BLD AUTO: 2.4 % (ref 0.3–6.2)
ERYTHROCYTE [DISTWIDTH] IN BLOOD BY AUTOMATED COUNT: 12.5 % (ref 12.3–15.4)
FLUAV SUBTYP SPEC NAA+PROBE: NOT DETECTED
FLUBV RNA ISLT QL NAA+PROBE: NOT DETECTED
GLOBULIN UR ELPH-MCNC: 2.4 GM/DL
GLUCOSE SERPL-MCNC: 115 MG/DL (ref 65–99)
HADV DNA SPEC NAA+PROBE: NOT DETECTED
HCOV 229E RNA SPEC QL NAA+PROBE: NOT DETECTED
HCOV HKU1 RNA SPEC QL NAA+PROBE: NOT DETECTED
HCOV NL63 RNA SPEC QL NAA+PROBE: NOT DETECTED
HCOV OC43 RNA SPEC QL NAA+PROBE: NOT DETECTED
HCT VFR BLD AUTO: 34.3 % (ref 34–46.6)
HGB BLD-MCNC: 10.9 G/DL (ref 12–15.9)
HMPV RNA NPH QL NAA+NON-PROBE: NOT DETECTED
HPIV1 RNA ISLT QL NAA+PROBE: NOT DETECTED
HPIV2 RNA SPEC QL NAA+PROBE: NOT DETECTED
HPIV3 RNA NPH QL NAA+PROBE: NOT DETECTED
HPIV4 P GENE NPH QL NAA+PROBE: NOT DETECTED
IMM GRANULOCYTES # BLD AUTO: 0.08 10*3/MM3 (ref 0–0.05)
IMM GRANULOCYTES NFR BLD AUTO: 0.6 % (ref 0–0.5)
LYMPHOCYTES # BLD AUTO: 0.97 10*3/MM3 (ref 0.7–3.1)
LYMPHOCYTES NFR BLD AUTO: 6.8 % (ref 19.6–45.3)
M PNEUMO IGG SER IA-ACNC: NOT DETECTED
MCH RBC QN AUTO: 26.7 PG (ref 26.6–33)
MCHC RBC AUTO-ENTMCNC: 31.8 G/DL (ref 31.5–35.7)
MCV RBC AUTO: 83.9 FL (ref 79–97)
MONOCYTES # BLD AUTO: 0.63 10*3/MM3 (ref 0.1–0.9)
MONOCYTES NFR BLD AUTO: 4.4 % (ref 5–12)
NEUTROPHILS NFR BLD AUTO: 12.13 10*3/MM3 (ref 1.7–7)
NEUTROPHILS NFR BLD AUTO: 85.6 % (ref 42.7–76)
NRBC BLD AUTO-RTO: 0 /100 WBC (ref 0–0.2)
NT-PROBNP SERPL-MCNC: 892 PG/ML (ref 0–900)
PLATELET # BLD AUTO: 231 10*3/MM3 (ref 140–450)
PMV BLD AUTO: 10 FL (ref 6–12)
POTASSIUM SERPL-SCNC: 3.7 MMOL/L (ref 3.5–5.2)
PROCALCITONIN SERPL-MCNC: 0.06 NG/ML (ref 0–0.25)
PROT SERPL-MCNC: 6.3 G/DL (ref 6–8.5)
QT INTERVAL: 423 MS
QTC INTERVAL: 486 MS
RBC # BLD AUTO: 4.09 10*6/MM3 (ref 3.77–5.28)
RHINOVIRUS RNA SPEC NAA+PROBE: NOT DETECTED
RSV RNA NPH QL NAA+NON-PROBE: NOT DETECTED
SARS-COV-2 RNA NPH QL NAA+NON-PROBE: NOT DETECTED
SODIUM SERPL-SCNC: 140 MMOL/L (ref 136–145)
TROPONIN T SERPL HS-MCNC: 23 NG/L
TROPONIN T SERPL HS-MCNC: 27 NG/L
WBC NRBC COR # BLD AUTO: 14.18 10*3/MM3 (ref 3.4–10.8)

## 2024-03-02 PROCEDURE — 96367 TX/PROPH/DG ADDL SEQ IV INF: CPT

## 2024-03-02 PROCEDURE — 85025 COMPLETE CBC W/AUTO DIFF WBC: CPT | Performed by: PHYSICIAN ASSISTANT

## 2024-03-02 PROCEDURE — 94799 UNLISTED PULMONARY SVC/PX: CPT

## 2024-03-02 PROCEDURE — G0378 HOSPITAL OBSERVATION PER HR: HCPCS

## 2024-03-02 PROCEDURE — 93010 ELECTROCARDIOGRAM REPORT: CPT | Performed by: INTERNAL MEDICINE

## 2024-03-02 PROCEDURE — 83880 ASSAY OF NATRIURETIC PEPTIDE: CPT | Performed by: PHYSICIAN ASSISTANT

## 2024-03-02 PROCEDURE — 25010000002 METHYLPREDNISOLONE PER 125 MG: Performed by: PHYSICIAN ASSISTANT

## 2024-03-02 PROCEDURE — 25010000002 CEFTRIAXONE PER 250 MG: Performed by: PHYSICIAN ASSISTANT

## 2024-03-02 PROCEDURE — 96375 TX/PRO/DX INJ NEW DRUG ADDON: CPT

## 2024-03-02 PROCEDURE — 71045 X-RAY EXAM CHEST 1 VIEW: CPT

## 2024-03-02 PROCEDURE — 99285 EMERGENCY DEPT VISIT HI MDM: CPT

## 2024-03-02 PROCEDURE — 71250 CT THORAX DX C-: CPT

## 2024-03-02 PROCEDURE — 84484 ASSAY OF TROPONIN QUANT: CPT | Performed by: STUDENT IN AN ORGANIZED HEALTH CARE EDUCATION/TRAINING PROGRAM

## 2024-03-02 PROCEDURE — 84145 PROCALCITONIN (PCT): CPT | Performed by: PHYSICIAN ASSISTANT

## 2024-03-02 PROCEDURE — 25010000002 AZITHROMYCIN PER 500 MG: Performed by: PHYSICIAN ASSISTANT

## 2024-03-02 PROCEDURE — 94640 AIRWAY INHALATION TREATMENT: CPT

## 2024-03-02 PROCEDURE — 94761 N-INVAS EAR/PLS OXIMETRY MLT: CPT

## 2024-03-02 PROCEDURE — 36415 COLL VENOUS BLD VENIPUNCTURE: CPT

## 2024-03-02 PROCEDURE — 96365 THER/PROPH/DIAG IV INF INIT: CPT

## 2024-03-02 PROCEDURE — 94760 N-INVAS EAR/PLS OXIMETRY 1: CPT

## 2024-03-02 PROCEDURE — 87154 CUL TYP ID BLD PTHGN 6+ TRGT: CPT | Performed by: PHYSICIAN ASSISTANT

## 2024-03-02 PROCEDURE — 93005 ELECTROCARDIOGRAM TRACING: CPT | Performed by: PHYSICIAN ASSISTANT

## 2024-03-02 PROCEDURE — 80053 COMPREHEN METABOLIC PANEL: CPT | Performed by: PHYSICIAN ASSISTANT

## 2024-03-02 PROCEDURE — 87147 CULTURE TYPE IMMUNOLOGIC: CPT | Performed by: PHYSICIAN ASSISTANT

## 2024-03-02 PROCEDURE — 25810000003 SODIUM CHLORIDE 0.9 % SOLUTION 250 ML FLEX CONT: Performed by: PHYSICIAN ASSISTANT

## 2024-03-02 PROCEDURE — 94664 DEMO&/EVAL PT USE INHALER: CPT

## 2024-03-02 PROCEDURE — 0202U NFCT DS 22 TRGT SARS-COV-2: CPT | Performed by: PHYSICIAN ASSISTANT

## 2024-03-02 PROCEDURE — 84484 ASSAY OF TROPONIN QUANT: CPT | Performed by: PHYSICIAN ASSISTANT

## 2024-03-02 PROCEDURE — 83605 ASSAY OF LACTIC ACID: CPT | Performed by: PHYSICIAN ASSISTANT

## 2024-03-02 PROCEDURE — 87040 BLOOD CULTURE FOR BACTERIA: CPT | Performed by: PHYSICIAN ASSISTANT

## 2024-03-02 RX ORDER — MULTIPLE VITAMINS W/ MINERALS TAB 9MG-400MCG
1 TAB ORAL DAILY
Status: DISCONTINUED | OUTPATIENT
Start: 2024-03-03 | End: 2024-03-06 | Stop reason: HOSPADM

## 2024-03-02 RX ORDER — LISINOPRIL 40 MG/1
40 TABLET ORAL DAILY
Status: DISCONTINUED | OUTPATIENT
Start: 2024-03-03 | End: 2024-03-06 | Stop reason: HOSPADM

## 2024-03-02 RX ORDER — BISACODYL 10 MG
10 SUPPOSITORY, RECTAL RECTAL DAILY PRN
Status: DISCONTINUED | OUTPATIENT
Start: 2024-03-02 | End: 2024-03-06 | Stop reason: HOSPADM

## 2024-03-02 RX ORDER — ACETAMINOPHEN 325 MG/1
650 TABLET ORAL EVERY 4 HOURS PRN
Status: DISCONTINUED | OUTPATIENT
Start: 2024-03-02 | End: 2024-03-06 | Stop reason: HOSPADM

## 2024-03-02 RX ORDER — UREA 10 %
3 LOTION (ML) TOPICAL NIGHTLY PRN
Status: DISCONTINUED | OUTPATIENT
Start: 2024-03-02 | End: 2024-03-06 | Stop reason: HOSPADM

## 2024-03-02 RX ORDER — IPRATROPIUM BROMIDE AND ALBUTEROL SULFATE 2.5; .5 MG/3ML; MG/3ML
3 SOLUTION RESPIRATORY (INHALATION)
Status: DISCONTINUED | OUTPATIENT
Start: 2024-03-02 | End: 2024-03-02

## 2024-03-02 RX ORDER — PANTOPRAZOLE SODIUM 40 MG/1
40 TABLET, DELAYED RELEASE ORAL
Status: DISCONTINUED | OUTPATIENT
Start: 2024-03-03 | End: 2024-03-06 | Stop reason: HOSPADM

## 2024-03-02 RX ORDER — ASPIRIN 81 MG/1
81 TABLET ORAL DAILY
Status: DISCONTINUED | OUTPATIENT
Start: 2024-03-03 | End: 2024-03-06

## 2024-03-02 RX ORDER — ROFLUMILAST 500 UG/1
500 TABLET ORAL DAILY
Status: DISCONTINUED | OUTPATIENT
Start: 2024-03-03 | End: 2024-03-06 | Stop reason: HOSPADM

## 2024-03-02 RX ORDER — ONDANSETRON 2 MG/ML
4 INJECTION INTRAMUSCULAR; INTRAVENOUS EVERY 6 HOURS PRN
Status: DISCONTINUED | OUTPATIENT
Start: 2024-03-02 | End: 2024-03-06 | Stop reason: HOSPADM

## 2024-03-02 RX ORDER — CYCLOBENZAPRINE HCL 10 MG
10 TABLET ORAL NIGHTLY PRN
Status: DISCONTINUED | OUTPATIENT
Start: 2024-03-02 | End: 2024-03-06 | Stop reason: HOSPADM

## 2024-03-02 RX ORDER — BUDESONIDE 0.5 MG/2ML
0.5 INHALANT ORAL
Status: DISCONTINUED | OUTPATIENT
Start: 2024-03-03 | End: 2024-03-06 | Stop reason: HOSPADM

## 2024-03-02 RX ORDER — ONDANSETRON 4 MG/1
4 TABLET, ORALLY DISINTEGRATING ORAL EVERY 4 HOURS PRN
Status: DISCONTINUED | OUTPATIENT
Start: 2024-03-02 | End: 2024-03-06 | Stop reason: HOSPADM

## 2024-03-02 RX ORDER — METHYLPREDNISOLONE SODIUM SUCCINATE 125 MG/2ML
125 INJECTION, POWDER, LYOPHILIZED, FOR SOLUTION INTRAMUSCULAR; INTRAVENOUS ONCE
Status: COMPLETED | OUTPATIENT
Start: 2024-03-02 | End: 2024-03-02

## 2024-03-02 RX ORDER — ALBUTEROL SULFATE 2.5 MG/3ML
2.5 SOLUTION RESPIRATORY (INHALATION)
Status: DISCONTINUED | OUTPATIENT
Start: 2024-03-02 | End: 2024-03-02

## 2024-03-02 RX ORDER — LANOLIN ALCOHOL/MO/W.PET/CERES
1 CREAM (GRAM) TOPICAL DAILY
Status: DISCONTINUED | OUTPATIENT
Start: 2024-03-03 | End: 2024-03-06 | Stop reason: HOSPADM

## 2024-03-02 RX ORDER — ALBUTEROL SULFATE 2.5 MG/3ML
2.5 SOLUTION RESPIRATORY (INHALATION) EVERY 4 HOURS PRN
Status: DISCONTINUED | OUTPATIENT
Start: 2024-03-02 | End: 2024-03-06 | Stop reason: HOSPADM

## 2024-03-02 RX ORDER — ATORVASTATIN CALCIUM 20 MG/1
40 TABLET, FILM COATED ORAL DAILY
Status: DISCONTINUED | OUTPATIENT
Start: 2024-03-03 | End: 2024-03-06 | Stop reason: HOSPADM

## 2024-03-02 RX ORDER — IPRATROPIUM BROMIDE AND ALBUTEROL SULFATE 2.5; .5 MG/3ML; MG/3ML
3 SOLUTION RESPIRATORY (INHALATION) ONCE
Status: COMPLETED | OUTPATIENT
Start: 2024-03-02 | End: 2024-03-02

## 2024-03-02 RX ORDER — CETIRIZINE HYDROCHLORIDE 10 MG/1
10 TABLET ORAL DAILY
Status: DISCONTINUED | OUTPATIENT
Start: 2024-03-03 | End: 2024-03-06 | Stop reason: HOSPADM

## 2024-03-02 RX ORDER — SODIUM CHLORIDE 0.9 % (FLUSH) 0.9 %
10 SYRINGE (ML) INJECTION AS NEEDED
Status: DISCONTINUED | OUTPATIENT
Start: 2024-03-02 | End: 2024-03-06 | Stop reason: HOSPADM

## 2024-03-02 RX ORDER — ROPINIROLE 2 MG/1
2 TABLET, FILM COATED ORAL NIGHTLY
Status: DISCONTINUED | OUTPATIENT
Start: 2024-03-03 | End: 2024-03-06 | Stop reason: HOSPADM

## 2024-03-02 RX ORDER — FUROSEMIDE 40 MG/1
40 TABLET ORAL 2 TIMES DAILY
Status: DISCONTINUED | OUTPATIENT
Start: 2024-03-03 | End: 2024-03-06 | Stop reason: HOSPADM

## 2024-03-02 RX ORDER — SERTRALINE HYDROCHLORIDE 100 MG/1
100 TABLET, FILM COATED ORAL DAILY
Status: DISCONTINUED | OUTPATIENT
Start: 2024-03-03 | End: 2024-03-06 | Stop reason: HOSPADM

## 2024-03-02 RX ORDER — POLYETHYLENE GLYCOL 3350 17 G/17G
17 POWDER, FOR SOLUTION ORAL DAILY PRN
Status: DISCONTINUED | OUTPATIENT
Start: 2024-03-02 | End: 2024-03-06 | Stop reason: HOSPADM

## 2024-03-02 RX ORDER — BISACODYL 5 MG/1
5 TABLET, DELAYED RELEASE ORAL DAILY PRN
Status: DISCONTINUED | OUTPATIENT
Start: 2024-03-02 | End: 2024-03-06 | Stop reason: HOSPADM

## 2024-03-02 RX ORDER — POTASSIUM CHLORIDE 750 MG/1
20 TABLET, FILM COATED, EXTENDED RELEASE ORAL DAILY
Status: DISCONTINUED | OUTPATIENT
Start: 2024-03-03 | End: 2024-03-06 | Stop reason: HOSPADM

## 2024-03-02 RX ORDER — ENOXAPARIN SODIUM 100 MG/ML
40 INJECTION SUBCUTANEOUS DAILY
Status: DISCONTINUED | OUTPATIENT
Start: 2024-03-03 | End: 2024-03-04

## 2024-03-02 RX ORDER — AMLODIPINE BESYLATE 10 MG/1
10 TABLET ORAL DAILY
Status: DISCONTINUED | OUTPATIENT
Start: 2024-03-03 | End: 2024-03-06 | Stop reason: HOSPADM

## 2024-03-02 RX ORDER — AMOXICILLIN 250 MG
2 CAPSULE ORAL 2 TIMES DAILY PRN
Status: DISCONTINUED | OUTPATIENT
Start: 2024-03-02 | End: 2024-03-06 | Stop reason: HOSPADM

## 2024-03-02 RX ORDER — CARVEDILOL 6.25 MG/1
6.25 TABLET ORAL 2 TIMES DAILY WITH MEALS
Status: DISCONTINUED | OUTPATIENT
Start: 2024-03-03 | End: 2024-03-06 | Stop reason: HOSPADM

## 2024-03-02 RX ORDER — HYDROCODONE BITARTRATE AND ACETAMINOPHEN 7.5; 325 MG/1; MG/1
1 TABLET ORAL EVERY 6 HOURS PRN
Status: DISCONTINUED | OUTPATIENT
Start: 2024-03-02 | End: 2024-03-06 | Stop reason: HOSPADM

## 2024-03-02 RX ORDER — IPRATROPIUM BROMIDE AND ALBUTEROL SULFATE 2.5; .5 MG/3ML; MG/3ML
3 SOLUTION RESPIRATORY (INHALATION) EVERY 4 HOURS PRN
Status: DISCONTINUED | OUTPATIENT
Start: 2024-03-02 | End: 2024-03-06 | Stop reason: HOSPADM

## 2024-03-02 RX ORDER — ZOLPIDEM TARTRATE 5 MG/1
10 TABLET ORAL NIGHTLY PRN
Status: DISCONTINUED | OUTPATIENT
Start: 2024-03-02 | End: 2024-03-06 | Stop reason: HOSPADM

## 2024-03-02 RX ORDER — PREDNISONE 20 MG/1
40 TABLET ORAL
Status: DISCONTINUED | OUTPATIENT
Start: 2024-03-03 | End: 2024-03-03

## 2024-03-02 RX ORDER — POLYETHYLENE GLYCOL 3350 17 G/17G
17 POWDER, FOR SOLUTION ORAL DAILY
Status: DISCONTINUED | OUTPATIENT
Start: 2024-03-03 | End: 2024-03-06 | Stop reason: HOSPADM

## 2024-03-02 RX ADMIN — CEFTRIAXONE 2000 MG: 2 INJECTION, POWDER, FOR SOLUTION INTRAMUSCULAR; INTRAVENOUS at 20:25

## 2024-03-02 RX ADMIN — ZOLPIDEM TARTRATE 10 MG: 5 TABLET ORAL at 23:29

## 2024-03-02 RX ADMIN — IPRATROPIUM BROMIDE AND ALBUTEROL SULFATE 3 ML: .5; 3 SOLUTION RESPIRATORY (INHALATION) at 23:54

## 2024-03-02 RX ADMIN — HYDROCODONE BITARTRATE AND ACETAMINOPHEN 1 TABLET: 7.5; 325 TABLET ORAL at 23:29

## 2024-03-02 RX ADMIN — IPRATROPIUM BROMIDE AND ALBUTEROL SULFATE 3 ML: 2.5; .5 SOLUTION RESPIRATORY (INHALATION) at 18:08

## 2024-03-02 RX ADMIN — AZITHROMYCIN MONOHYDRATE 500 MG: 500 INJECTION, POWDER, LYOPHILIZED, FOR SOLUTION INTRAVENOUS at 21:16

## 2024-03-02 RX ADMIN — METHYLPREDNISOLONE SODIUM SUCCINATE 125 MG: 125 INJECTION, POWDER, FOR SOLUTION INTRAMUSCULAR; INTRAVENOUS at 20:25

## 2024-03-02 NOTE — ED PROVIDER NOTES
EMERGENCY DEPARTMENT ENCOUNTER      PCP: Javan Martinez MD  Patient Care Team:  Javan Martinez MD as PCP - General (Internal Medicine)  Ag Melo Jr., MD as Consulting Physician (Pulmonary Disease)  Cleveland Devine MD as Consulting Physician (Gastroenterology)  Earnest Gan MD as Consulting Physician (Cardiology)  Daniela Shin MD PhD as Consulting Physician (Hematology and Oncology)   Independent Historians: Patient    HPI:  Chief Complaint: Shortness of breath, fever  A complete HPI/ROS/PMH/PSH/SH/FH are unobtainable due to: None    Chronic or social conditions impacting patient care (social determinants of health): None    Context: Paz Browne is a 68 y.o. female with history of infective endocarditis, COPD on 2 L, CHF supplemental O2 at baseline who presents to the ED c/o cough, shortness of breath, fever.  Patient reports temperature of 101 last night.  She states she was recently seen in the hospital for similar symptoms and discharged on steroids.  She states she had some improvement at home but once steroids were completed her symptoms returned and she has been feeling worse.  Patient does express concern due to history of infective endocarditis she may be septic.  She has not required increased O2 use at home.    Review of prior external notes and/or external test results outside of this encounter: Chest x-ray on 2/17/2024 showed obstructive airway disease without opacities or effusions.      PAST MEDICAL HISTORY  Active Ambulatory Problems     Diagnosis Date Noted    PAF (paroxysmal atrial fibrillation) 01/25/2016    Hypertension     Hyperlipidemia     Pain medication agreement 05/04/2016    Hiatal hernia 05/04/2016    Primary osteoarthritis involving multiple joints 05/04/2016    Pulmonary hypertension     S/P TVR (tricuspid valve repair) 07/07/2016    Pulmonary nodule 10/13/2016    Restless leg syndrome 11/18/2016    Chronic low back pain 08/02/2017    Dysplastic polyp of  colon 08/07/2017    History of mitral valve replacement with tissue graft 08/11/2017    Chronic hypoxemic respiratory failure 08/13/2017    Anemia 08/09/2017    Celiac artery stenosis 08/24/2017    Intertrigo 08/25/2017    Abnormal EKG 06/30/2019    Muscle spasms of both lower extremities 12/22/2020    Iron deficiency anemia 03/30/2021    Adenomatous polyp of colon 03/30/2021    Gastroesophageal reflux disease without esophagitis 03/30/2021    Headache 07/04/2021    History of endocarditis 02/03/2022    Pneumonia of both lower lobes due to infectious organism 10/16/2022    Peptic ulcer disease 10/17/2022    Peripheral vascular disease 10/17/2022    Hyperlipidemia 10/17/2022    Hyperglycemia 10/17/2022    COPD with acute exacerbation 10/19/2022    Chronic diastolic CHF (congestive heart failure) 03/29/2023    Chronic bilateral low back pain 04/19/2023    COPD exacerbation 11/17/2023     Resolved Ambulatory Problems     Diagnosis Date Noted    Tachycardia     Renal failure     Palpitations     Mitral regurgitation     Heart failure 06/08/2016    Long term current use of anticoagulant 10/13/2016    Aspiration pneumonia 10/14/2016    Hemoptysis 10/14/2016    Lower GI bleed 08/09/2017    Precordial pain 08/11/2017    Oral candidiasis 08/14/2017    VAN (acute kidney injury) 08/15/2017    GI bleed 12/01/2017    Acute exacerbation of chronic obstructive pulmonary disease (COPD) 01/02/2018    Pneumonia due to infectious organism 07/18/2018    Insomnia due to medical condition 02/01/2019    COPD (chronic obstructive pulmonary disease) 06/30/2019    Shingles 04/08/2020    Vertigo 10/06/2020    Dark stools 03/30/2021    Acute hyperkalemia 05/10/2021    Tremor 05/10/2021    Anemia 05/10/2021    COPD exacerbation 06/14/2021    Septicemia 07/02/2021    Bacteremia 07/03/2021    Acute hypoxemic respiratory failure 10/17/2022    COPD (chronic obstructive pulmonary disease) 10/17/2022    Pulmonary nodule 10/17/2022    Hypokalemia  10/17/2022    Chronic respiratory failure 10/19/2022     Past Medical History:   Diagnosis Date    Asthma     Atrial flutter     Cataract     Chronic respiratory failure with hypoxia     Colon polyp     History of CHF (congestive heart failure)     History of home oxygen therapy     Infectious viral hepatitis     Long term (current) use of anticoagulants     Pneumonia        The patient has started, but not completed, their COVID-19 vaccination series.    PAST SURGICAL HISTORY  Past Surgical History:   Procedure Laterality Date    CARDIAC CATHETERIZATION  09/01/2014    Right dominant systemt, normal coronary arteries.     CARDIAC CATHETERIZATION Left 6/10/2016    Procedure: Cardiac catheterization;  Surgeon: Sergei Hall MD;  Location: [facility] CATH INVASIVE LOCATION;  Service:     CARDIAC CATHETERIZATION N/A 6/10/2016    Procedure: Right Heart Cath;  Surgeon: Sergei Hall MD;  Location: [facility] CATH INVASIVE LOCATION;  Service:     CATARACT EXTRACTION      COLONOSCOPY      COLONOSCOPY N/A 8/4/2017    Procedure: COLONOSCOPY TO CECUM/TI WITH POLYPECTOMY ( COLD BX);  Surgeon: Cleveland Devine MD;  Location: [facility] ENDOSCOPY;  Service:     COLONOSCOPY N/A 8/10/2017    Procedure: COLONOSCOPY to cecum and TI with 2 clips placed at transverse;  Surgeon: Earnest PALOMO MD;  Location: [facility] ENDOSCOPY;  Service:     COLONOSCOPY N/A 12/22/2017    Procedure: COLONOSCOPY INTO CECUM WITH COLD POLYPECTOMIES;  Surgeon: Cleveland Devine MD;  Location: [facility] ENDOSCOPY;  Service:     COLONOSCOPY N/A 5/17/2021    Procedure: COLONOSCOPY to cecum;  Surgeon: Cleveland Devine MD;  Location: [facility] ENDOSCOPY;  Service: Gastroenterology;  Laterality: N/A;  Pre: Fe deficency anemia, h/x of polyps  Post: fair prep, normal    ENDOSCOPY N/A 8/17/2017    Procedure: ESOPHAGOGASTRODUODENOSCOPY;  Surgeon: Porsha Ruby MD;  Location: [facility] ENDOSCOPY;  Service:     ENDOSCOPY N/A 12/22/2017    Procedure: ESOPHAGOGASTRODUODENOSCOPY WITH BIOPSIES;  Surgeon:  Cleevland Devine MD;  Location: [facility] ENDOSCOPY;  Service:     ENDOSCOPY N/A 2021    Procedure: ESOPHAGOGASTRODUODENOSCOPY  with biopsies;  Surgeon: Cleveland Devine MD;  Location: [facility] ENDOSCOPY;  Service: Gastroenterology;  Laterality: N/A;  Pre: Fe deficency anemia, nausea, heme positive stool   Post: gastritis, sloughing of distal esophagus mucosa    GALLBLADDER SURGERY      HEMORRHOIDECTOMY      HYSTERECTOMY      KIDNEY SURGERY  2013    Stent placement    MAZE PROCEDURE      MITRAL VALVE REPLACEMENT      TONSILLECTOMY      TRICUSPID VALVE REPLACEMENT           FAMILY HISTORY  Family History   Adopted: Yes   Problem Relation Age of Onset    No Known Problems Mother     No Known Problems Father          SOCIAL HISTORY  Social History     Socioeconomic History    Marital status:     Number of children: 2   Tobacco Use    Smoking status: Former     Current packs/day: 0.00     Average packs/day: 3.0 packs/day for 54.0 years (162.0 ttl pk-yrs)     Types: Cigarettes     Start date:      Quit date:      Years since quittin.1     Passive exposure: Past    Smokeless tobacco: Never    Tobacco comments:     caffeine - tea or mt dew    Vaping Use    Vaping status: Never Used   Substance and Sexual Activity    Alcohol use: No    Drug use: No    Sexual activity: Defer         ALLERGIES  Bupropion, Cephalexin, and Metoprolol        REVIEW OF SYSTEMS  Review of Systems   Constitutional:  Positive for fever.   Respiratory:  Positive for cough and shortness of breath.    Cardiovascular:  Negative for chest pain and leg swelling.   Gastrointestinal:  Negative for abdominal pain.        All systems reviewed and negative except for those discussed in HPI.       PHYSICAL EXAM    I have reviewed the triage vital signs and nursing notes.    ED Triage Vitals [24 1736]   Temp Heart Rate Resp BP SpO2   98.6 °F (37 °C) 80 16 -- 94 %      Temp src Heart Rate Source Patient Position BP Location FiO2 (%)    Tympanic Monitor -- -- --       Physical Exam  GENERAL: alert, chronically ill appearing, no acute distress  SKIN: Warm, dry  HENT: Normocephalic, atraumatic  EYES: no scleral icterus  CV: regular rhythm, regular rate  RESPIRATORY: decreased breath sounds throughout with faint wheezing to left posterior   ABDOMEN: soft, nontender, nondistended  MUSCULOSKELETAL: no deformity, no edema  NEURO: alert, moves all extremities, follows commands          LAB RESULTS  Labs Reviewed   BLOOD CULTURE - Abnormal; Notable for the following components:       Result Value    Blood Culture Staphylococcus, coagulase negative (*)     Gram Stain Anaerobic Bottle Gram positive cocci (*)     All other components within normal limits    Narrative:     Probable contaminant requires clinical correlation, susceptibility not performed unless requested by physician.     BLOOD CULTURE ID - Abnormal; Notable for the following components:    BCID, PCR   (*)     Value: Staph spp, not aureus or lugdunensis. Identification by BCID2 PCR.    All other components within normal limits   COMPREHENSIVE METABOLIC PANEL - Abnormal; Notable for the following components:    Glucose 115 (*)     CO2 29.4 (*)     All other components within normal limits    Narrative:     GFR Normal >60  Chronic Kidney Disease <60  Kidney Failure <15     SINGLE HSTROPONIN T - Abnormal; Notable for the following components:    HS Troponin T 27 (*)     All other components within normal limits    Narrative:     High Sensitive Troponin T Reference Range:  <14.0 ng/L- Negative Female for AMI  <22.0 ng/L- Negative Male for AMI  >=14 - Abnormal Female indicating possible myocardial injury.  >=22 - Abnormal Male indicating possible myocardial injury.   Clinicians would have to utilize clinical acumen, EKG, Troponin, and serial changes to determine if it is an Acute Myocardial Infarction or myocardial injury due to an underlying chronic condition.        CBC WITH AUTO DIFFERENTIAL -  Abnormal; Notable for the following components:    WBC 14.18 (*)     Hemoglobin 10.9 (*)     Neutrophil % 85.6 (*)     Lymphocyte % 6.8 (*)     Monocyte % 4.4 (*)     Immature Grans % 0.6 (*)     Neutrophils, Absolute 12.13 (*)     Immature Grans, Absolute 0.08 (*)     All other components within normal limits   TROPONIN - Abnormal; Notable for the following components:    HS Troponin T 23 (*)     All other components within normal limits    Narrative:     High Sensitive Troponin T Reference Range:  <14.0 ng/L- Negative Female for AMI  <22.0 ng/L- Negative Male for AMI  >=14 - Abnormal Female indicating possible myocardial injury.  >=22 - Abnormal Male indicating possible myocardial injury.   Clinicians would have to utilize clinical acumen, EKG, Troponin, and serial changes to determine if it is an Acute Myocardial Infarction or myocardial injury due to an underlying chronic condition.        BASIC METABOLIC PANEL - Abnormal; Notable for the following components:    Glucose 170 (*)     Anion Gap 15.2 (*)     All other components within normal limits    Narrative:     GFR Normal >60  Chronic Kidney Disease <60  Kidney Failure <15     CBC (NO DIFF) - Abnormal; Notable for the following components:    Hemoglobin 10.5 (*)     Hematocrit 33.2 (*)     MCH 26.3 (*)     RDW-SD 36.8 (*)     All other components within normal limits   HIGH SENSITIVITIY TROPONIN T 2HR - Abnormal; Notable for the following components:    HS Troponin T 20 (*)     All other components within normal limits    Narrative:     High Sensitive Troponin T Reference Range:  <14.0 ng/L- Negative Female for AMI  <22.0 ng/L- Negative Male for AMI  >=14 - Abnormal Female indicating possible myocardial injury.  >=22 - Abnormal Male indicating possible myocardial injury.   Clinicians would have to utilize clinical acumen, EKG, Troponin, and serial changes to determine if it is an Acute Myocardial Infarction or myocardial injury due to an underlying chronic  condition.        FERRITIN - Abnormal; Notable for the following components:    Ferritin 308.00 (*)     All other components within normal limits    Narrative:     Results may be falsely decreased if patient taking Biotin.     IRON PROFILE - Abnormal; Notable for the following components:    Iron Saturation (TSAT) 15 (*)     Transferrin 194 (*)     TIBC 289 (*)     All other components within normal limits   VITAMIN B12 - Abnormal; Notable for the following components:    Vitamin B-12 1,087 (*)     All other components within normal limits    Narrative:     Results may be falsely increased if patient taking Biotin.     HAPTOGLOBIN - Abnormal; Notable for the following components:    Haptoglobin 348 (*)     All other components within normal limits   BASIC METABOLIC PANEL - Abnormal; Notable for the following components:    Glucose 145 (*)     All other components within normal limits    Narrative:     GFR Normal >60  Chronic Kidney Disease <60  Kidney Failure <15     CBC WITH AUTO DIFFERENTIAL - Abnormal; Notable for the following components:    WBC 14.66 (*)     RBC 3.68 (*)     Hemoglobin 10.1 (*)     Hematocrit 31.1 (*)     Neutrophil % 93.1 (*)     Lymphocyte % 4.8 (*)     Monocyte % 1.6 (*)     Eosinophil % 0.0 (*)     Neutrophils, Absolute 13.66 (*)     Immature Grans, Absolute 0.06 (*)     All other components within normal limits   BASIC METABOLIC PANEL - Abnormal; Notable for the following components:    Glucose 141 (*)     Creatinine 1.07 (*)     CO2 31.0 (*)     eGFR 56.0 (*)     All other components within normal limits    Narrative:     GFR Normal >60  Chronic Kidney Disease <60  Kidney Failure <15     CBC WITH AUTO DIFFERENTIAL - Abnormal; Notable for the following components:    WBC 14.24 (*)     RBC 3.74 (*)     Hemoglobin 10.2 (*)     Hematocrit 31.7 (*)     Neutrophil % 93.5 (*)     Lymphocyte % 4.5 (*)     Monocyte % 1.3 (*)     Eosinophil % 0.0 (*)     Immature Grans % 0.6 (*)     Neutrophils,  Absolute 13.32 (*)     Lymphocytes, Absolute 0.64 (*)     Immature Grans, Absolute 0.08 (*)     All other components within normal limits   BASIC METABOLIC PANEL - Abnormal; Notable for the following components:    Glucose 119 (*)     CO2 31.1 (*)     All other components within normal limits    Narrative:     GFR Normal >60  Chronic Kidney Disease <60  Kidney Failure <15     CBC WITH AUTO DIFFERENTIAL - Abnormal; Notable for the following components:    WBC 13.80 (*)     Hemoglobin 11.1 (*)     Neutrophil % 91.6 (*)     Lymphocyte % 5.7 (*)     Monocyte % 2.2 (*)     Eosinophil % 0.0 (*)     Neutrophils, Absolute 12.63 (*)     Immature Grans, Absolute 0.06 (*)     All other components within normal limits   RESPIRATORY PANEL PCR W/ COVID-19 (SARS-COV-2), NP SWAB IN UTM/VTP, 2 HR TAT - Normal    Narrative:     In the setting of a positive respiratory panel with a viral infection PLUS a negative procalcitonin without other underlying concern for bacterial infection, consider observing off antibiotics or discontinuation of antibiotics and continue supportive care. If the respiratory panel is positive for atypical bacterial infection (Bordetella pertussis, Chlamydophila pneumoniae, or Mycoplasma pneumoniae), consider antibiotic de-escalation to target atypical bacterial infection.   BLOOD CULTURE - Normal    Narrative:     Less than seven (7) mL's of blood was collected.  Insufficient quantity may yield false negative results.   LEGIONELLA ANTIGEN, URINE - Normal   STREP PNEUMO AG, URINE OR CSF - Normal   LACTIC ACID, PLASMA - Normal   PROCALCITONIN - Normal    Narrative:     As a Marker for Sepsis (Non-Neonates):    1. <0.5 ng/mL represents a low risk of severe sepsis and/or septic shock.  2. >2 ng/mL represents a high risk of severe sepsis and/or septic shock.    As a Marker for Lower Respiratory Tract Infections that require antibiotic therapy:    PCT on Admission    Antibiotic Therapy       6-12 Hrs later    >0.5    "             Strongly Recommended  >0.25 - <0.5        Recommended   0.1 - 0.25          Discouraged              Remeasure/reassess PCT  <0.1                Strongly Discouraged     Remeasure/reassess PCT    As 28 day mortality risk marker: \"Change in Procalcitonin Result\" (>80% or <=80%) if Day 0 (or Day 1) and Day 4 values are available. Refer to http://www.[facility]cal[facility]    Change in PCT <=80%  A decrease of PCT levels below or equal to 80% defines a positive change in PCT test result representing a higher risk for 28-day all-cause mortality of patients diagnosed with severe sepsis for septic shock.    Change in PCT >80%  A decrease of PCT levels of more than 80% defines a negative change in PCT result representing a lower risk for 28-day all-cause mortality of patients diagnosed with severe sepsis or septic shock.      BNP (IN-HOUSE) - Normal    Narrative:     This assay is used as an aid in the diagnosis of individuals suspected of having heart failure. It can be used as an aid in the diagnosis of acute decompensated heart failure (ADHF) in patients presenting with signs and symptoms of ADHF to the emergency department (ED). In addition, NT-proBNP of <300 pg/mL indicates ADHF is not likely.    Age Range Result Interpretation  NT-proBNP Concentration (pg/mL:      <50             Positive            >450                   Gray                 300-450                    Negative             <300    50-75           Positive            >900                  Gray                300-900                  Negative            <300      >75             Positive            >1800                  Gray                300-1800                  Negative            <300   HEMOGLOBIN A1C - Normal    Narrative:     Hemoglobin A1C Ranges:    Increased Risk for Diabetes  5.7% to 6.4%  Diabetes                     >= 6.5%  Diabetic Goal                < 7.0%   OCCULT BLOOD X 1, STOOL - Normal   FOLATE - Normal    " Narrative:     Results may be falsely increased if patient taking Biotin.     RETICULOCYTES - Normal   LACTATE DEHYDROGENASE - Normal   RESPIRATORY CULTURE   RESPIRATORY CULTURE   CBC AND DIFFERENTIAL    Narrative:     The following orders were created for panel order CBC & Differential.  Procedure                               Abnormality         Status                     ---------                               -----------         ------                     CBC Auto Differential[[id number]]        Abnormal            Final result                 Please view results for these tests on the individual orders.   CBC AND DIFFERENTIAL    Narrative:     The following orders were created for panel order CBC & Differential.  Procedure                               Abnormality         Status                     ---------                               -----------         ------                     CBC Auto Differential[[id number]]        Abnormal            Final result                 Please view results for these tests on the individual orders.   CBC AND DIFFERENTIAL    Narrative:     The following orders were created for panel order CBC & Differential.  Procedure                               Abnormality         Status                     ---------                               -----------         ------                     CBC Auto Differential[[id number]]        Abnormal            Final result                 Please view results for these tests on the individual orders.   CBC AND DIFFERENTIAL    Narrative:     The following orders were created for panel order CBC & Differential.  Procedure                               Abnormality         Status                     ---------                               -----------         ------                     CBC Auto Differential[[id number]]        Abnormal            Final result                 Please view results for these tests on the individual orders.         Ordered the  above labs and independently reviewed and interpreted the results.        RADIOLOGY  CT Chest Without Contrast Diagnostic   Final Result   The patient has some vague groundglass attenuation within the left lower   lobe. An acute infectious or inflammatory process is not excluded.       Radiation dose reduction techniques were utilized, including automated   exposure control and exposure modulation based on body size.           This report was finalized on 3/2/2024 7:40 PM by Dr. Leigh Parr M.D on Workstation: [id number]          XR Chest 1 View   Final Result            I ordered the above noted radiological studies. Independently reviewed and interpreted by me.  See dictation for official radiology interpretation.      PROCEDURES    Procedures      MEDICATIONS GIVEN IN ER    Medications   ipratropium-albuterol (DUO-NEB) nebulizer solution 3 mL (3 mL Nebulization Given 3/2/24 1808)   cefTRIAXone (ROCEPHIN) 2,000 mg in sodium chloride 0.9 % 100 mL IVPB-VTB (0 mg Intravenous Stopped 3/2/24 2116)   azithromycin (ZITHROMAX) 500 mg in sodium chloride 0.9 % 250 mL IVPB-VTB (0 mg Intravenous Stopped 3/2/24 2350)   methylPREDNISolone sodium succinate (SOLU-Medrol) injection 125 mg (125 mg Intravenous Given 3/2/24 2025)   perflutren (DEFINITY) 8.476 mg in Sodium Chloride (PF) 0.9 % 10 mL injection (2 mL Intravenous Given 3/3/24 0955)         PROGRESS, DATA ANALYSIS, CONSULTS, AND MEDICAL DECISION MAKING    All labs have been independently reviewed and interpreted by me.  All radiology studies have been independently reviewed and interpreted by me and discussed with radiologist dictating the report.   EKG's independently reviewed and interpreted by me.  Discussion below represents my analysis of pertinent findings related to patient's condition, differential diagnosis, treatment plan and final disposition.    My differential diagnosis for dyspnea includes but is not limited to:    Asthma, COPD, pneumonia, pulmonary  embolism, acute respiratory distress syndrome, pneumothorax, hemothorax, pleural effusion, pulmonary fibrosis, congestive heart failure, myocardial infarction, DKA, uremia, acidosis, sepsis, anemia, drug related, hyperventilation, CNS disease, inhalation exposure, airway obstruction, aspiration, electrolyte abnormalities, myasthenia gravis, panic attack, anaphylaxis      ED Course as of 03/06/24 1538   Sat Mar 02, 2024   1831 WBC(!): 14.18 [DC]   1831 Hemoglobin(!): 10.9 [DC]   1901 Lactate: 1.1 [DC]   1901 Procalcitonin: 0.06 [DC]   1901 Creatinine: 0.81 [DC]   1901 Sodium: 140 [DC]   1901 Potassium: 3.7 [DC]   1955 Discussed case with Dr. Burgess [facility], who will admit the patient. [DC]      ED Course User Index  [DC] Irina Cook PA             AS OF 15:38 EST VITALS:    BP - 146/85  HR - 60  TEMP - 97.7 °F (36.5 °C) (Oral)  O2 SATS - 99%        DIAGNOSIS  Final diagnoses:   Community acquired pneumonia, unspecified laterality   COPD with acute exacerbation         DISPOSITION  ED Disposition       ED Disposition   Decision to Admit    Condition   --    Comment   Level of Care: Telemetry [5]   Diagnosis: Community acquired pneumonia [489893]   Admitting Physician: CLEMENTE BURGESS [[id number]]   Attending Physician: CLEMENTE BURGESS [[id number]]                    Note Disclaimer: At [facility], we believe that sharing information builds trust and better relationships. You are receiving this note because you recently visited [facility]. It is possible you will see health information before a provider has talked with you about it. This kind of information can be easy to misunderstand. To help you fully understand what it means for your health, we urge you to discuss this note with your provider.         Irina Cook PA  03/06/24 1538

## 2024-03-03 ENCOUNTER — APPOINTMENT (OUTPATIENT)
Dept: CARDIOLOGY | Facility: HOSPITAL | Age: 69
End: 2024-03-03
Payer: MEDICARE

## 2024-03-03 LAB
ANION GAP SERPL CALCULATED.3IONS-SCNC: 15.2 MMOL/L (ref 5–15)
AORTIC DIMENSIONLESS INDEX: 0.6 (DI)
ASCENDING AORTA: 3 CM
BH CV ECHO MEAS - ACS: 1.87 CM
BH CV ECHO MEAS - AO MAX PG: 15.2 MMHG
BH CV ECHO MEAS - AO MEAN PG: 9.2 MMHG
BH CV ECHO MEAS - AO ROOT DIAM: 2.4 CM
BH CV ECHO MEAS - AO V2 MAX: 194.8 CM/SEC
BH CV ECHO MEAS - AO V2 VTI: 40.4 CM
BH CV ECHO MEAS - AVA(I,D): 1.76 CM2
BH CV ECHO MEAS - EDV(CUBED): 125 ML
BH CV ECHO MEAS - EDV(MOD-SP2): 96 ML
BH CV ECHO MEAS - EDV(MOD-SP4): 73 ML
BH CV ECHO MEAS - EF(MOD-BP): 73.7 %
BH CV ECHO MEAS - EF(MOD-SP2): 76 %
BH CV ECHO MEAS - EF(MOD-SP4): 71.2 %
BH CV ECHO MEAS - ESV(CUBED): 44.2 ML
BH CV ECHO MEAS - ESV(MOD-SP2): 23 ML
BH CV ECHO MEAS - ESV(MOD-SP4): 21 ML
BH CV ECHO MEAS - FS: 29.3 %
BH CV ECHO MEAS - IVS/LVPW: 1.14 CM
BH CV ECHO MEAS - IVSD: 1.1 CM
BH CV ECHO MEAS - LAT PEAK E' VEL: 10.9 CM/SEC
BH CV ECHO MEAS - LV MASS(C)D: 190.2 GRAMS
BH CV ECHO MEAS - LV MAX PG: 6.1 MMHG
BH CV ECHO MEAS - LV MEAN PG: 3.8 MMHG
BH CV ECHO MEAS - LV V1 MAX: 123.9 CM/SEC
BH CV ECHO MEAS - LV V1 VTI: 23.7 CM
BH CV ECHO MEAS - LVIDD: 5 CM
BH CV ECHO MEAS - LVIDS: 3.5 CM
BH CV ECHO MEAS - LVOT AREA: 3 CM2
BH CV ECHO MEAS - LVOT DIAM: 1.96 CM
BH CV ECHO MEAS - LVPWD: 0.97 CM
BH CV ECHO MEAS - MED PEAK E' VEL: 8.5 CM/SEC
BH CV ECHO MEAS - MV DEC SLOPE: 1237 CM/SEC2
BH CV ECHO MEAS - MV DEC TIME: 0.25 SEC
BH CV ECHO MEAS - MV E MAX VEL: 226 CM/SEC
BH CV ECHO MEAS - MV MAX PG: 21.2 MMHG
BH CV ECHO MEAS - MV MEAN PG: 9 MMHG
BH CV ECHO MEAS - MV P1/2T: 56.1 MSEC
BH CV ECHO MEAS - MV V2 VTI: 49.1 CM
BH CV ECHO MEAS - MVA(P1/2T): 3.9 CM2
BH CV ECHO MEAS - MVA(VTI): 1.45 CM2
BH CV ECHO MEAS - PA ACC TIME: 0.08 SEC
BH CV ECHO MEAS - PA V2 MAX: 133.7 CM/SEC
BH CV ECHO MEAS - QP/QS: 1.02
BH CV ECHO MEAS - RV MAX PG: 3.7 MMHG
BH CV ECHO MEAS - RV V1 MAX: 95.9 CM/SEC
BH CV ECHO MEAS - RV V1 VTI: 15.7 CM
BH CV ECHO MEAS - RVOT DIAM: 2.43 CM
BH CV ECHO MEAS - RVSP: 41 MMHG
BH CV ECHO MEAS - SV(LVOT): 71.3 ML
BH CV ECHO MEAS - SV(MOD-SP2): 73 ML
BH CV ECHO MEAS - SV(MOD-SP4): 52 ML
BH CV ECHO MEAS - SV(RVOT): 72.5 ML
BH CV ECHO MEAS - TAPSE (>1.6): 1.07 CM
BH CV ECHO MEAS - TR MAX PG: 36.3 MMHG
BH CV ECHO MEAS - TR MAX VEL: 301.2 CM/SEC
BH CV ECHO MEASUREMENTS AVERAGE E/E' RATIO: 23.3
BH CV XLRA - RV BASE: 2.7 CM
BH CV XLRA - RV LENGTH: 6.5 CM
BH CV XLRA - RV MID: 3.2 CM
BH CV XLRA - TDI S': 8.6 CM/SEC
BUN SERPL-MCNC: 9 MG/DL (ref 8–23)
BUN/CREAT SERPL: 12 (ref 7–25)
CALCIUM SPEC-SCNC: 9.3 MG/DL (ref 8.6–10.5)
CHLORIDE SERPL-SCNC: 99 MMOL/L (ref 98–107)
CO2 SERPL-SCNC: 26.8 MMOL/L (ref 22–29)
CREAT SERPL-MCNC: 0.75 MG/DL (ref 0.57–1)
DEPRECATED RDW RBC AUTO: 36.8 FL (ref 37–54)
EGFRCR SERPLBLD CKD-EPI 2021: 85.8 ML/MIN/1.73
ERYTHROCYTE [DISTWIDTH] IN BLOOD BY AUTOMATED COUNT: 12.3 % (ref 12.3–15.4)
FERRITIN SERPL-MCNC: 308 NG/ML (ref 13–150)
FOLATE SERPL-MCNC: >20 NG/ML (ref 4.78–24.2)
GEN 5 2HR TROPONIN T REFLEX: 20 NG/L
GLUCOSE SERPL-MCNC: 170 MG/DL (ref 65–99)
HAPTOGLOB SERPL-MCNC: 348 MG/DL (ref 30–200)
HBA1C MFR BLD: 5 % (ref 4.8–5.6)
HCT VFR BLD AUTO: 33.2 % (ref 34–46.6)
HGB BLD-MCNC: 10.5 G/DL (ref 12–15.9)
IRON 24H UR-MRATE: 42 MCG/DL (ref 37–145)
IRON SATN MFR SERPL: 15 % (ref 20–50)
L PNEUMO1 AG UR QL IA: NEGATIVE
LDH SERPL-CCNC: 169 U/L (ref 135–214)
LEFT ATRIUM VOLUME INDEX: 26.1 ML/M2
MCH RBC QN AUTO: 26.3 PG (ref 26.6–33)
MCHC RBC AUTO-ENTMCNC: 31.6 G/DL (ref 31.5–35.7)
MCV RBC AUTO: 83 FL (ref 79–97)
PLATELET # BLD AUTO: 241 10*3/MM3 (ref 140–450)
PMV BLD AUTO: 10.5 FL (ref 6–12)
POTASSIUM SERPL-SCNC: 4.4 MMOL/L (ref 3.5–5.2)
RBC # BLD AUTO: 4 10*6/MM3 (ref 3.77–5.28)
RETICS # AUTO: 0.06 10*6/MM3 (ref 0.02–0.13)
RETICS/RBC NFR AUTO: 1.56 % (ref 0.7–1.9)
S PNEUM AG SPEC QL LA: NEGATIVE
SINUS: 2.1 CM
SODIUM SERPL-SCNC: 141 MMOL/L (ref 136–145)
STJ: 1.94 CM
TIBC SERPL-MCNC: 289 MCG/DL (ref 298–536)
TRANSFERRIN SERPL-MCNC: 194 MG/DL (ref 200–360)
TROPONIN T DELTA: -3 NG/L
TV MEAN GRADIENT: 4 MMHG
TV PEAK GRADIENT: 8 MMHG
VIT B12 BLD-MCNC: 1087 PG/ML (ref 211–946)
WBC NRBC COR # BLD AUTO: 7.8 10*3/MM3 (ref 3.4–10.8)

## 2024-03-03 PROCEDURE — 83540 ASSAY OF IRON: CPT | Performed by: INTERNAL MEDICINE

## 2024-03-03 PROCEDURE — G0378 HOSPITAL OBSERVATION PER HR: HCPCS

## 2024-03-03 PROCEDURE — 94799 UNLISTED PULMONARY SVC/PX: CPT

## 2024-03-03 PROCEDURE — 25010000002 METHYLPREDNISOLONE PER 125 MG: Performed by: INTERNAL MEDICINE

## 2024-03-03 PROCEDURE — 84484 ASSAY OF TROPONIN QUANT: CPT | Performed by: STUDENT IN AN ORGANIZED HEALTH CARE EDUCATION/TRAINING PROGRAM

## 2024-03-03 PROCEDURE — 83010 ASSAY OF HAPTOGLOBIN QUANT: CPT | Performed by: INTERNAL MEDICINE

## 2024-03-03 PROCEDURE — 94664 DEMO&/EVAL PT USE INHALER: CPT

## 2024-03-03 PROCEDURE — 84466 ASSAY OF TRANSFERRIN: CPT | Performed by: INTERNAL MEDICINE

## 2024-03-03 PROCEDURE — 82728 ASSAY OF FERRITIN: CPT | Performed by: INTERNAL MEDICINE

## 2024-03-03 PROCEDURE — 63710000001 PREDNISONE PER 1 MG: Performed by: STUDENT IN AN ORGANIZED HEALTH CARE EDUCATION/TRAINING PROGRAM

## 2024-03-03 PROCEDURE — 25010000002 CEFTRIAXONE PER 250 MG: Performed by: STUDENT IN AN ORGANIZED HEALTH CARE EDUCATION/TRAINING PROGRAM

## 2024-03-03 PROCEDURE — 63710000001 ONDANSETRON ODT 4 MG TABLET DISPERSIBLE: Performed by: STUDENT IN AN ORGANIZED HEALTH CARE EDUCATION/TRAINING PROGRAM

## 2024-03-03 PROCEDURE — 85045 AUTOMATED RETICULOCYTE COUNT: CPT | Performed by: INTERNAL MEDICINE

## 2024-03-03 PROCEDURE — 82607 VITAMIN B-12: CPT | Performed by: INTERNAL MEDICINE

## 2024-03-03 PROCEDURE — 93306 TTE W/DOPPLER COMPLETE: CPT

## 2024-03-03 PROCEDURE — 94761 N-INVAS EAR/PLS OXIMETRY MLT: CPT

## 2024-03-03 PROCEDURE — 87449 NOS EACH ORGANISM AG IA: CPT | Performed by: STUDENT IN AN ORGANIZED HEALTH CARE EDUCATION/TRAINING PROGRAM

## 2024-03-03 PROCEDURE — 25010000002 ENOXAPARIN PER 10 MG: Performed by: STUDENT IN AN ORGANIZED HEALTH CARE EDUCATION/TRAINING PROGRAM

## 2024-03-03 PROCEDURE — 96372 THER/PROPH/DIAG INJ SC/IM: CPT

## 2024-03-03 PROCEDURE — 85027 COMPLETE CBC AUTOMATED: CPT | Performed by: STUDENT IN AN ORGANIZED HEALTH CARE EDUCATION/TRAINING PROGRAM

## 2024-03-03 PROCEDURE — 96376 TX/PRO/DX INJ SAME DRUG ADON: CPT

## 2024-03-03 PROCEDURE — 83615 LACTATE (LD) (LDH) ENZYME: CPT | Performed by: INTERNAL MEDICINE

## 2024-03-03 PROCEDURE — 25510000001 PERFLUTREN (DEFINITY) 8.476 MG IN SODIUM CHLORIDE (PF) 0.9 % 10 ML INJECTION: Performed by: STUDENT IN AN ORGANIZED HEALTH CARE EDUCATION/TRAINING PROGRAM

## 2024-03-03 PROCEDURE — 96366 THER/PROPH/DIAG IV INF ADDON: CPT

## 2024-03-03 PROCEDURE — 93306 TTE W/DOPPLER COMPLETE: CPT | Performed by: INTERNAL MEDICINE

## 2024-03-03 PROCEDURE — 82746 ASSAY OF FOLIC ACID SERUM: CPT | Performed by: INTERNAL MEDICINE

## 2024-03-03 PROCEDURE — 87899 AGENT NOS ASSAY W/OPTIC: CPT | Performed by: STUDENT IN AN ORGANIZED HEALTH CARE EDUCATION/TRAINING PROGRAM

## 2024-03-03 PROCEDURE — 36415 COLL VENOUS BLD VENIPUNCTURE: CPT | Performed by: STUDENT IN AN ORGANIZED HEALTH CARE EDUCATION/TRAINING PROGRAM

## 2024-03-03 PROCEDURE — 80048 BASIC METABOLIC PNL TOTAL CA: CPT | Performed by: STUDENT IN AN ORGANIZED HEALTH CARE EDUCATION/TRAINING PROGRAM

## 2024-03-03 PROCEDURE — 83036 HEMOGLOBIN GLYCOSYLATED A1C: CPT | Performed by: INTERNAL MEDICINE

## 2024-03-03 RX ORDER — IPRATROPIUM BROMIDE AND ALBUTEROL SULFATE 2.5; .5 MG/3ML; MG/3ML
3 SOLUTION RESPIRATORY (INHALATION)
Status: DISCONTINUED | OUTPATIENT
Start: 2024-03-03 | End: 2024-03-03

## 2024-03-03 RX ORDER — GUAIFENESIN 600 MG/1
1200 TABLET, EXTENDED RELEASE ORAL EVERY 12 HOURS SCHEDULED
Status: DISCONTINUED | OUTPATIENT
Start: 2024-03-03 | End: 2024-03-06 | Stop reason: HOSPADM

## 2024-03-03 RX ORDER — ECHINACEA PURPUREA EXTRACT 125 MG
2 TABLET ORAL EVERY 4 HOURS PRN
Status: DISCONTINUED | OUTPATIENT
Start: 2024-03-03 | End: 2024-03-06 | Stop reason: HOSPADM

## 2024-03-03 RX ORDER — METHYLPREDNISOLONE SODIUM SUCCINATE 125 MG/2ML
60 INJECTION, POWDER, LYOPHILIZED, FOR SOLUTION INTRAMUSCULAR; INTRAVENOUS EVERY 8 HOURS
Status: DISCONTINUED | OUTPATIENT
Start: 2024-03-03 | End: 2024-03-05

## 2024-03-03 RX ORDER — IPRATROPIUM BROMIDE AND ALBUTEROL SULFATE 2.5; .5 MG/3ML; MG/3ML
3 SOLUTION RESPIRATORY (INHALATION)
Status: DISCONTINUED | OUTPATIENT
Start: 2024-03-03 | End: 2024-03-06

## 2024-03-03 RX ADMIN — PANTOPRAZOLE SODIUM 40 MG: 40 TABLET, DELAYED RELEASE ORAL at 20:28

## 2024-03-03 RX ADMIN — ROFLUMILAST 500 MCG: 500 TABLET ORAL at 08:29

## 2024-03-03 RX ADMIN — CETIRIZINE HYDROCHLORIDE 10 MG: 10 TABLET ORAL at 08:29

## 2024-03-03 RX ADMIN — LISINOPRIL 40 MG: 40 TABLET ORAL at 08:29

## 2024-03-03 RX ADMIN — METHYLPREDNISOLONE SODIUM SUCCINATE 60 MG: 125 INJECTION, POWDER, FOR SOLUTION INTRAMUSCULAR; INTRAVENOUS at 17:07

## 2024-03-03 RX ADMIN — HYDROCODONE BITARTRATE AND ACETAMINOPHEN 1 TABLET: 7.5; 325 TABLET ORAL at 23:03

## 2024-03-03 RX ADMIN — CEFTRIAXONE SODIUM 1000 MG: 1 INJECTION, POWDER, FOR SOLUTION INTRAMUSCULAR; INTRAVENOUS at 20:30

## 2024-03-03 RX ADMIN — ASPIRIN 81 MG: 81 TABLET, COATED ORAL at 08:28

## 2024-03-03 RX ADMIN — ZOLPIDEM TARTRATE 10 MG: 5 TABLET ORAL at 23:04

## 2024-03-03 RX ADMIN — GUAIFENESIN 1200 MG: 600 TABLET, EXTENDED RELEASE ORAL at 20:32

## 2024-03-03 RX ADMIN — Medication 1 TABLET: at 08:29

## 2024-03-03 RX ADMIN — FUROSEMIDE 40 MG: 40 TABLET ORAL at 08:28

## 2024-03-03 RX ADMIN — SERTRALINE 100 MG: 100 TABLET, FILM COATED ORAL at 08:29

## 2024-03-03 RX ADMIN — NYSTATIN 500000 UNITS: 100000 SUSPENSION ORAL at 13:18

## 2024-03-03 RX ADMIN — MAGNESIUM OXIDE 400 MG (241.3 MG MAGNESIUM) TABLET 400 MG: TABLET at 08:29

## 2024-03-03 RX ADMIN — IPRATROPIUM BROMIDE AND ALBUTEROL SULFATE 3 ML: .5; 3 SOLUTION RESPIRATORY (INHALATION) at 19:41

## 2024-03-03 RX ADMIN — CARVEDILOL 6.25 MG: 6.25 TABLET, FILM COATED ORAL at 17:07

## 2024-03-03 RX ADMIN — IPRATROPIUM BROMIDE AND ALBUTEROL SULFATE 3 ML: .5; 3 SOLUTION RESPIRATORY (INHALATION) at 23:49

## 2024-03-03 RX ADMIN — CARVEDILOL 6.25 MG: 6.25 TABLET, FILM COATED ORAL at 08:29

## 2024-03-03 RX ADMIN — AMLODIPINE BESYLATE 10 MG: 10 TABLET ORAL at 08:29

## 2024-03-03 RX ADMIN — POTASSIUM CHLORIDE 20 MEQ: 750 TABLET, EXTENDED RELEASE ORAL at 08:28

## 2024-03-03 RX ADMIN — PERFLUTREN 2 ML: 6.52 INJECTION, SUSPENSION INTRAVENOUS at 09:55

## 2024-03-03 RX ADMIN — HYDROCODONE BITARTRATE AND ACETAMINOPHEN 1 TABLET: 7.5; 325 TABLET ORAL at 17:07

## 2024-03-03 RX ADMIN — NYSTATIN 500000 UNITS: 100000 SUSPENSION ORAL at 20:30

## 2024-03-03 RX ADMIN — PREDNISONE 40 MG: 20 TABLET ORAL at 08:29

## 2024-03-03 RX ADMIN — POLYETHYLENE GLYCOL 3350 17 G: 17 POWDER, FOR SOLUTION ORAL at 08:29

## 2024-03-03 RX ADMIN — NYSTATIN 500000 UNITS: 100000 SUSPENSION ORAL at 17:07

## 2024-03-03 RX ADMIN — NYSTATIN 500000 UNITS: 100000 SUSPENSION ORAL at 08:29

## 2024-03-03 RX ADMIN — BUDESONIDE 0.5 MG: 0.5 INHALANT ORAL at 07:42

## 2024-03-03 RX ADMIN — ENOXAPARIN SODIUM 40 MG: 100 INJECTION SUBCUTANEOUS at 08:29

## 2024-03-03 RX ADMIN — ROPINIROLE 2 MG: 2 TABLET, FILM COATED ORAL at 20:30

## 2024-03-03 RX ADMIN — ATORVASTATIN CALCIUM 40 MG: 20 TABLET, FILM COATED ORAL at 08:28

## 2024-03-03 RX ADMIN — ONDANSETRON 4 MG: 4 TABLET, ORALLY DISINTEGRATING ORAL at 10:59

## 2024-03-03 RX ADMIN — HYDROCODONE BITARTRATE AND ACETAMINOPHEN 1 TABLET: 7.5; 325 TABLET ORAL at 08:33

## 2024-03-03 RX ADMIN — FUROSEMIDE 40 MG: 40 TABLET ORAL at 20:30

## 2024-03-03 RX ADMIN — BUDESONIDE 0.5 MG: 0.5 INHALANT ORAL at 19:51

## 2024-03-03 NOTE — PROGRESS NOTES
Name: Paz Browne ADMIT: 3/2/2024   : 1955  PCP: Javan Martinez MD    MRN: [id number] LOS: 0 days   AGE/SEX: 68 y.o. female  ROOM: [facility] ([place])     Subjective   Subjective   Patient reports no change and dyspnea/dry cough/wheezing.  Positive atypical chest pain.  No fever or chills.  No palpitation.  No ankle edema.    Review of Systems  GI.  No abdominal pain or vomiting.  Positive nausea.  .  No dysuria or hematuria.     Objective   Objective   Vital Signs  Temp:  [98.1 °F (36.7 °C)-98.6 °F (37 °C)] 98.1 °F (36.7 °C)  Heart Rate:  [66-98] 85  Resp:  [16-20] 18  BP: (117-159)/() 132/112  SpO2:  [94 %-100 %] 96 %  on  Flow (L/min):  [2] 2;   Device (Oxygen Therapy): nasal cannula;humidified    Intake/Output Summary (Last 24 hours) at 3/3/2024 1544  Last data filed at 3/3/2024 1312  Gross per 24 hour   Intake 500 ml   Output --   Net 500 ml     Body mass index is 20.66 kg/m².      24  2147 24  0453 24  0954   Weight: 60.9 kg (134 lb 3.2 oz) 59.7 kg (131 lb 11.2 oz) 59.7 kg (131 lb 9.8 oz)     Physical Exam  General.  Elderly female.  Alert and oriented x 3.  In no apparent pain/distress/diaphoresis.  Normal mood and affect.  Eyes.  Pupils equal round and reactive.  Intact extraocular musculature.  No pallor or jaundice.  Oral cavity.  Moist mucous membrane.    Neck.  Supple.  No JVD.  No lymphadenopathy or thyromegaly.  Cardiovascular.  Sternotomy scar.  Regular rate and rhythm.  Grade 2 systolic murmur.  Chest.  Poor bilateral air entry with very minimal air movement and scattered bilateral expiratory wheezes and rhonchi on the left lung base.  Abdomen.  Soft lax.  No tenderness.  No organomegaly.  No guarding or rebound  Extremities.  No clubbing/cyanosis/edema  CNS.  No acute focal neurological deficits.      Results Review:      Results from last 7 days   Lab Units 24  0438 24  1820   SODIUM mmol/L 141 140   POTASSIUM mmol/L 4.4 3.7   CHLORIDE mmol/L 99 99   CO2  "mmol/L 26.8 29.4*   BUN mg/dL 9 11   CREATININE mg/dL 0.75 0.81   GLUCOSE mg/dL 170* 115*   CALCIUM mg/dL 9.3 8.6   AST (SGOT) U/L  --  14   ALT (SGPT) U/L  --  11     Estimated Creatinine Clearance: 65.8 mL/min (by C-G formula based on SCr of 0.75 mg/dL).          Results from last 7 days   Lab Units 03/03/24  0006 03/02/24  2205 03/02/24  1820   HSTROP T ng/L 20* 23* 27*     Results from last 7 days   Lab Units 03/02/24  1820   PROBNP pg/mL 892.0                   Invalid input(s): \"LDLCALC\"  Results from last 7 days   Lab Units 03/03/24  0438 03/02/24  1820   WBC 10*3/mm3 7.80 14.18*   HEMOGLOBIN g/dL 10.5* 10.9*   HEMATOCRIT % 33.2* 34.3   PLATELETS 10*3/mm3 241 231   MCV fL 83.0 83.9   MCH pg 26.3* 26.7   MCHC g/dL 31.6 31.8   RDW % 12.3 12.5   RDW-SD fl 36.8* 38.0   MPV fL 10.5 10.0   NEUTROPHIL % %  --  85.6*   LYMPHOCYTE % %  --  6.8*   MONOCYTES % %  --  4.4*   EOSINOPHIL % %  --  2.4   BASOPHIL % %  --  0.2   IMM GRAN % %  --  0.6*   NEUTROS ABS 10*3/mm3  --  12.13*   LYMPHS ABS 10*3/mm3  --  0.97   MONOS ABS 10*3/mm3  --  0.63   EOS ABS 10*3/mm3  --  0.34   BASOS ABS 10*3/mm3  --  0.03   IMMATURE GRANS (ABS) 10*3/mm3  --  0.08*   NRBC /100 WBC  --  0.0             Results from last 7 days   Lab Units 03/02/24  1820   PROCALCITONIN ng/mL 0.06   LACTATE mmol/L 1.1                 Results from last 7 days   Lab Units 03/02/24  1821   ADENOVIRUS DETECTION BY PCR  Not Detected   CORONAVIRUS 229E  Not Detected   CORONAVIRUS HKU1  Not Detected   CORONAVIRUS NL63  Not Detected   CORONAVIRUS OC43  Not Detected   HUMAN METAPNEUMOVIRUS  Not Detected   HUMAN RHINOVIRUS/ENTEROVIRUS  Not Detected   INFLUENZA B PCR  Not Detected   PARAINFLUENZA 1  Not Detected   PARAINFLUENZA VIRUS 2  Not Detected   PARAINFLUENZA VIRUS 3  Not Detected   PARAINFLUENZA VIRUS 4  Not Detected   BORDETELLA PERTUSSIS PCR  Not Detected   CHLAMYDOPHILA PNEUMONIAE PCR  Not Detected   MYCOPLAMA PNEUMO PCR  Not Detected   INFLUENZA A PCR  Not " Detected   RSV, PCR  Not Detected               Imaging:  Imaging Results (Last 24 Hours)       Procedure Component Value Units Date/Time    CT Chest Without Contrast Diagnostic [[id number]] Collected: 03/02/24 1930     Updated: 03/02/24 1943    Narrative:      CT OF THE CHEST WITHOUT CONTRAST     HISTORY: Cough and fever     COMPARISON: November 17, 2023     TECHNIQUE: Axial CT imaging was obtained through the thorax. No IV  contrast was administered.     FINDINGS:  Right apical scarring appears stable. There are advanced background  emphysematous changes. There is some vague groundglass attenuation which  is noted within the left lower lobe, which is more apparent than on  prior exam, and which may represent an acute infectious or inflammatory  process. The thyroid gland and trachea are within normal limits. There  is a small hiatal hernia. Patient is status post mitral and tricuspid  valve replacement. Mediastinal lymph nodes do not appear pathologically  enlarged. Thoracic aorta is normal in caliber. There are coronary artery  calcifications. Images through the upper abdomen do not demonstrate any  acute abnormalities. No acute osseous abnormalities are seen.          Impression:      The patient has some vague groundglass attenuation within the left lower  lobe. An acute infectious or inflammatory process is not excluded.     Radiation dose reduction techniques were utilized, including automated  exposure control and exposure modulation based on body size.        This report was finalized on 3/2/2024 7:40 PM by Dr. Leigh Parr M.D on Workstation: [id number]       XR Chest 1 View [[id number]] Collected: 03/02/24 1839     Updated: 03/02/24 1843    Narrative:      XR CHEST 1 VW-     HISTORY: 68-year-old female with cough and fever.     FINDINGS: Advanced emphysema. There is increased density and markings at  the lingula suspicious for acute pneumonia and there may be an acute  infiltrate at the medial  aspect of the right lower lobe superimposed on  chronic change as well. There is no evidence for effusions or CHF.     This report was finalized on 3/2/2024 6:40 PM by Dr. Franchesca Marrufo M.D on  Workstation: [id number]                  I reviewed the patient's new clinical results / labs / tests / procedures      Assessment/Plan     Active Hospital Problems    Diagnosis  POA    **Community acquired pneumonia [J18.9]  Yes    Leukocytosis [D72.829]  Yes    Elevated troponin [R79.89]  Yes    COPD exacerbation [J44.1]  Yes    Chronic diastolic CHF (congestive heart failure) [I50.32]  Yes    History of endocarditis [Z86.79]  Not Applicable    Chronic hypoxemic respiratory failure [J96.11]  Yes    Chronic low back pain [M54.50, G89.29]  Yes    S/P TVR (tricuspid valve repair) [Z98.890]  Not Applicable    Pulmonary hypertension [I27.20]  Yes    Hypertension [I10]  Yes    Hyperlipidemia [E78.5]  Yes    PAF (paroxysmal atrial fibrillation) [I48.0]  Yes      Resolved Hospital Problems   No resolved problems to display.           Community-acquired pneumonia and COPD exacerbation in a patient with a history of chronic hypoxemic respiratory failure and oxygen dependent COPD.  CT scan of the chest and chest x-ray are noted.  Respiratory PCR panel is negative.  Lactic acid and procalcitonin are negative.  Negative urine Legionella and Streptococcus antigen.  Blood cultures are pending.  Currently on Rocephin/p.o. steroids and Pulmicort mini nebs.  Will add DuoNebs/Mucinex/incentive spirometry.  Leukocytosis improving.  Consult pulmonary.  History of hypertension/chronic diastolic congestive heart failure/atrial flutter status post cardioversion/tissue valve mitral valve replacement/tricuspid valve repair/history of endocarditis.  No fever.  Awaiting blood cultures.  2D echo with normal ejection fraction, left ventricular hypertrophy, vegetation on the bioprosthetic mitral valve, mild aortic valve stenosis, moderate mitral stenosis.   Blood pressure under good control except the last blood pressure readings and I will closely monitor.  Not on anticoagulation as she had a significant GI bleed before.  In normal sinus rhythm.  Will continue Norvasc/Coreg/aspirin/lisinopril/Lasix.  No signs of congestive heart failure.  The vegetation on the mitral valve appears to be new from the last echo and I will consult cardiology.  Celiac artery stenosis/dyslipidemia.  Continue aspirin and Lipitor.  Hyperglycemia.  Will check A1c.  New onset anemia.  Will workup.  VTE prophylaxis.  Lovenox.    Discussed my findings and plan of treatment with the patient/significant other  Disposition.  To be determined based on clinical course.  Ask PT to evaluate.        Ovi Chavez MD  [facility]  03/03/24  15:44 EST

## 2024-03-03 NOTE — ED NOTES
Nursing report ED to floor  Paz Browne  68 y.o.  female    HPI :  HPI (Adult)  Stated Reason for Visit: fever, soa    Chief Complaint  Chief Complaint   Patient presents with    Shortness of Breath    Fever     Paz Browne is a 68 y.o. female with history of infective endocarditis, COPD on 2 L, CHF supplemental O2 at baseline who presents to the ED c/o cough, shortness of breath, fever.  Patient reports temperature of 101 last night.  She states she was recently seen in the hospital for similar symptoms and discharged on steroids.  She states she had some improvement at home but once steroids were completed her symptoms returned and she has been feeling worse.  Patient does express concern due to history of infective endocarditis she may be septic.  She has not required increased O2 use at home.     Admitting doctor:   Sergei Burgess MD    Admitting diagnosis:   The primary encounter diagnosis was Community acquired pneumonia, unspecified laterality. A diagnosis of COPD with acute exacerbation was also pertinent to this visit.    Code status:   Current Code Status       Date Active Code Status Order ID Comments User Context       3/2/2024 2010 CPR (Attempt to Resuscitate) [id number]  Sergei Burgess MD ED        Question Answer    Code Status (Patient has no pulse and is not breathing) CPR (Attempt to Resuscitate)    Medical Interventions (Patient has pulse or is breathing) Full    Level Of Support Discussed With Patient                    Allergies:   Bupropion, Cephalexin, and Metoprolol    Isolation:   No active isolations    Intake and Output  No intake or output data in the 24 hours ending 03/02/24 2015    Weight:   There were no vitals filed for this visit.    Most recent vitals:   Vitals:    03/02/24 1736 03/02/24 1801 03/02/24 1808   BP:  117/97    Pulse: 80 71 66   Resp: 16  18   Temp: 98.6 °F (37 °C)     TempSrc: Tympanic     SpO2: 94% 94% 94%       Active LDAs/IV Access:   Lines,  Drains & Airways       Active LDAs       Name Placement date Placement time Site Days    Peripheral IV 03/02/24 1835 Right Antecubital 03/02/24 1835  Antecubital  less than 1                    Labs (abnormal labs have a star):   Labs Reviewed   COMPREHENSIVE METABOLIC PANEL - Abnormal; Notable for the following components:       Result Value    Glucose 115 (*)     CO2 29.4 (*)     All other components within normal limits    Narrative:     GFR Normal >60  Chronic Kidney Disease <60  Kidney Failure <15     SINGLE HSTROPONIN T - Abnormal; Notable for the following components:    HS Troponin T 27 (*)     All other components within normal limits    Narrative:     High Sensitive Troponin T Reference Range:  <14.0 ng/L- Negative Female for AMI  <22.0 ng/L- Negative Male for AMI  >=14 - Abnormal Female indicating possible myocardial injury.  >=22 - Abnormal Male indicating possible myocardial injury.   Clinicians would have to utilize clinical acumen, EKG, Troponin, and serial changes to determine if it is an Acute Myocardial Infarction or myocardial injury due to an underlying chronic condition.        CBC WITH AUTO DIFFERENTIAL - Abnormal; Notable for the following components:    WBC 14.18 (*)     Hemoglobin 10.9 (*)     Neutrophil % 85.6 (*)     Lymphocyte % 6.8 (*)     Monocyte % 4.4 (*)     Immature Grans % 0.6 (*)     Neutrophils, Absolute 12.13 (*)     Immature Grans, Absolute 0.08 (*)     All other components within normal limits   RESPIRATORY PANEL PCR W/ COVID-19 (SARS-COV-2), NP SWAB IN UTM/VTP, 2 HR TAT - Normal    Narrative:     In the setting of a positive respiratory panel with a viral infection PLUS a negative procalcitonin without other underlying concern for bacterial infection, consider observing off antibiotics or discontinuation of antibiotics and continue supportive care. If the respiratory panel is positive for atypical bacterial infection (Bordetella pertussis, Chlamydophila pneumoniae, or  "Mycoplasma pneumoniae), consider antibiotic de-escalation to target atypical bacterial infection.   LACTIC ACID, PLASMA - Normal   PROCALCITONIN - Normal    Narrative:     As a Marker for Sepsis (Non-Neonates):    1. <0.5 ng/mL represents a low risk of severe sepsis and/or septic shock.  2. >2 ng/mL represents a high risk of severe sepsis and/or septic shock.    As a Marker for Lower Respiratory Tract Infections that require antibiotic therapy:    PCT on Admission    Antibiotic Therapy       6-12 Hrs later    >0.5                Strongly Recommended  >0.25 - <0.5        Recommended   0.1 - 0.25          Discouraged              Remeasure/reassess PCT  <0.1                Strongly Discouraged     Remeasure/reassess PCT    As 28 day mortality risk marker: \"Change in Procalcitonin Result\" (>80% or <=80%) if Day 0 (or Day 1) and Day 4 values are available. Refer to http://www.[employer]-pct-calculator.com    Change in PCT <=80%  A decrease of PCT levels below or equal to 80% defines a positive change in PCT test result representing a higher risk for 28-day all-cause mortality of patients diagnosed with severe sepsis for septic shock.    Change in PCT >80%  A decrease of PCT levels of more than 80% defines a negative change in PCT result representing a lower risk for 28-day all-cause mortality of patients diagnosed with severe sepsis or septic shock.      BNP (IN-HOUSE) - Normal    Narrative:     This assay is used as an aid in the diagnosis of individuals suspected of having heart failure. It can be used as an aid in the diagnosis of acute decompensated heart failure (ADHF) in patients presenting with signs and symptoms of ADHF to the emergency department (ED). In addition, NT-proBNP of <300 pg/mL indicates ADHF is not likely.    Age Range Result Interpretation  NT-proBNP Concentration (pg/mL:      <50             Positive            >450                   Gray                 300-450                    Negative             " <300    50-75           Positive            >900                  Gray                300-900                  Negative            <300      >75             Positive            >1800                  Gray                300-1800                  Negative            <300   BLOOD CULTURE   BLOOD CULTURE   CBC AND DIFFERENTIAL    Narrative:     The following orders were created for panel order CBC & Differential.  Procedure                               Abnormality         Status                     ---------                               -----------         ------                     CBC Auto Differential[[id number]]        Abnormal            Final result                 Please view results for these tests on the individual orders.       EKG:   ECG 12 Lead Dyspnea   Preliminary Result   HEART RATE= 79  bpm   RR Interval= 759  ms   NE Interval=   ms   P Horizontal Axis=   deg   P Front Axis=   deg   QRSD Interval= 106  ms   QT Interval= 423  ms   QTcB= 486  ms   QRS Axis= 30  deg   T Wave Axis= 62  deg   - ABNORMAL ECG -   Atrial fibrillation   RSR' in V1 or V2, right VCD or RVH   Electronically Signed By:    Date and Time of Study: 2024-03-02 18:30:02          Meds given in ED:   Medications   sodium chloride 0.9 % flush 10 mL (has no administration in time range)   cefTRIAXone (ROCEPHIN) 2,000 mg in sodium chloride 0.9 % 100 mL IVPB-VTB (has no administration in time range)   azithromycin (ZITHROMAX) 500 mg in sodium chloride 0.9 % 250 mL IVPB-VTB (has no administration in time range)   methylPREDNISolone sodium succinate (SOLU-Medrol) injection 125 mg (has no administration in time range)   acetaminophen (TYLENOL) tablet 650 mg (has no administration in time range)   ondansetron ODT (ZOFRAN-ODT) disintegrating tablet 4 mg (has no administration in time range)     Or   ondansetron (ZOFRAN) injection 4 mg (has no administration in time range)   melatonin tablet 3 mg (has no administration in time range)   Enoxaparin  Sodium (LOVENOX) syringe 40 mg (has no administration in time range)   sennosides-docusate (PERICOLACE) 8.6-50 MG per tablet 2 tablet (has no administration in time range)     And   polyethylene glycol (MIRALAX) packet 17 g (has no administration in time range)     And   bisacodyl (DULCOLAX) EC tablet 5 mg (has no administration in time range)     And   bisacodyl (DULCOLAX) suppository 10 mg (has no administration in time range)   ipratropium-albuterol (DUO-NEB) nebulizer solution 3 mL (3 mL Nebulization Given 3/2/24 1808)       Imaging results:  CT Chest Without Contrast Diagnostic    Result Date: 3/2/2024  The patient has some vague groundglass attenuation within the left lower lobe. An acute infectious or inflammatory process is not excluded.  Radiation dose reduction techniques were utilized, including automated exposure control and exposure modulation based on body size.   This report was finalized on 3/2/2024 7:40 PM by Dr. Leigh Parr M.D on Workstation: [id number]       Ambulatory status:   - as tolerated     Social issues:   Social History     Socioeconomic History    Marital status:     Number of children: 2   Tobacco Use    Smoking status: Former     Current packs/day: 0.00     Average packs/day: 3.0 packs/day for 54.0 years (162.0 ttl pk-yrs)     Types: Cigarettes     Start date:      Quit date: 2007     Years since quittin.1     Passive exposure: Past    Smokeless tobacco: Never    Tobacco comments:     caffeine - tea or mt dew    Vaping Use    Vaping status: Never Used   Substance and Sexual Activity    Alcohol use: No    Drug use: No    Sexual activity: Defer       Peripheral Neurovascular  Peripheral Neurovascular (Adult)  Peripheral Neurovascular WDL: WDL    Neuro Cognitive  Neuro Cognitive (Adult)  Cognitive/Neuro/Behavioral WDL: WDL    Learning  Learning Assessment (Adult)  Learning Readiness and Ability: no barriers identified    Respiratory  Respiratory WDL  Respiratory  WDL: .WDL except, rhythm/pattern  Rhythm/Pattern, Respiratory: shortness of breath  Breath Sounds  Breath Sounds: All Fields, ADONIS  All Lung Fields Breath Sounds: Anterior:, Lateral:, diminished  ADONIS Breath Sounds: wheezes, expiratory    Abdominal Pain       Pain Assessments  Pain (Adult)  (0-10) Pain Rating: Rest: 9  (0-10) Pain Rating: Activity: 9  Pain Location: back    NIH Stroke Scale       Sadie Bhatti RN  03/02/24 20:15 EST

## 2024-03-03 NOTE — H&P
Patient Name:  Paz Browne  YOB: 1955  MRN:  [id number]  Admit Date:  3/2/2024  Patient Care Team:  Javan Martinez MD as PCP - General (Internal Medicine)  Ag Melo Jr., MD as Consulting Physician (Pulmonary Disease)  Cleveland Devine MD as Consulting Physician (Gastroenterology)  Earnest Gan MD as Consulting Physician (Cardiology)  Daniela Shin MD PhD as Consulting Physician (Hematology and Oncology)      Subjective   History Present Illness     Chief Complaint   Patient presents with    Shortness of Breath    Fever       Ms. Browne is a 68 y.o. female with COPD (2L O2), HFpEF, HLD, HTN, status post bioprosthetic MVR, ALEJANDRA closure, A-fib (previously on warfarin, stopped due to GIB as well as maintenance of sinus rhythm) presenting with shortness of breath.  In addition she states that she has had fevers for the past few days.  She is significantly more short of breath compared to baseline.  She notes dyspnea even just walking to the restroom.  Otherwise she denies congestion, increased cough, syncope, chest pain, abdominal pain, N/V/D, urinary symptoms.    Review of Systems   Constitutional:  Positive for fatigue. Negative for activity change, appetite change and diaphoresis.   HENT:  Negative for congestion, ear discharge, mouth sores and tinnitus.    Eyes:  Negative for pain, discharge and redness.   Respiratory:  Positive for cough and shortness of breath. Negative for choking, chest tightness and wheezing.    Cardiovascular:  Negative for chest pain and palpitations.   Gastrointestinal:  Negative for abdominal pain, constipation, diarrhea and nausea.   Endocrine: Negative for cold intolerance and heat intolerance.   Genitourinary:  Negative for flank pain, frequency, hematuria, urgency and vaginal discharge.   Musculoskeletal:  Negative for back pain, joint swelling and neck pain.   Skin:  Negative for color change and rash.   Neurological:  Negative for syncope,  speech difficulty and headaches.   Psychiatric/Behavioral:  Negative for agitation, behavioral problems and hallucinations.         Personal History     Past Medical History:   Diagnosis Date    VAN (acute kidney injury)     Anemia     Asthma     Atrial flutter     cardioversion    Cataract     Celiac artery stenosis     Chronic respiratory failure with hypoxia     Colon polyp     COPD (chronic obstructive pulmonary disease)     GI bleed     Hiatal hernia     History of CHF (congestive heart failure)     due to MR    History of home oxygen therapy     3 lpm NC    History of mitral valve replacement with tissue graft     Hyperlipidemia     Hypertension     Infectious viral hepatitis     B    Intertrigo     Long term (current) use of anticoagulants     Mitral regurgitation     s/p tissue MVR    PAF (paroxysmal atrial fibrillation)     s/p MAZE    Pneumonia     Pulmonary hypertension     S/P TVR (tricuspid valve repair) 7/7/2016     Past Surgical History:   Procedure Laterality Date    CARDIAC CATHETERIZATION  09/01/2014    Right dominant systemt, normal coronary arteries.     CARDIAC CATHETERIZATION Left 6/10/2016    Procedure: Cardiac catheterization;  Surgeon: Sergei Hall MD;  Location: [facility] CATH INVASIVE LOCATION;  Service:     CARDIAC CATHETERIZATION N/A 6/10/2016    Procedure: Right Heart Cath;  Surgeon: Sergei Hall MD;  Location: [facility] CATH INVASIVE LOCATION;  Service:     CATARACT EXTRACTION      COLONOSCOPY      COLONOSCOPY N/A 8/4/2017    Procedure: COLONOSCOPY TO CECUM/TI WITH POLYPECTOMY ( COLD BX);  Surgeon: Clevleand Devine MD;  Location: [facility] ENDOSCOPY;  Service:     COLONOSCOPY N/A 8/10/2017    Procedure: COLONOSCOPY to cecum and TI with 2 clips placed at transverse;  Surgeon: Earnest PALOMO MD;  Location: [facility] ENDOSCOPY;  Service:     COLONOSCOPY N/A 12/22/2017    Procedure: COLONOSCOPY INTO CECUM WITH COLD POLYPECTOMIES;  Surgeon: Cleveland Devine MD;  Location: [facility] ENDOSCOPY;  Service:      COLONOSCOPY N/A 2021    Procedure: COLONOSCOPY to cecum;  Surgeon: Cleveland Devine MD;  Location:  [facility] ENDOSCOPY;  Service: Gastroenterology;  Laterality: N/A;  Pre: Fe deficency anemia, h/x of polyps  Post: fair prep, normal    ENDOSCOPY N/A 2017    Procedure: ESOPHAGOGASTRODUODENOSCOPY;  Surgeon: Porsha Ruby MD;  Location: [facility] ENDOSCOPY;  Service:     ENDOSCOPY N/A 2017    Procedure: ESOPHAGOGASTRODUODENOSCOPY WITH BIOPSIES;  Surgeon: Cleveland Devine MD;  Location: [facility] ENDOSCOPY;  Service:     ENDOSCOPY N/A 2021    Procedure: ESOPHAGOGASTRODUODENOSCOPY  with biopsies;  Surgeon: Cleveland Devine MD;  Location: [facility] ENDOSCOPY;  Service: Gastroenterology;  Laterality: N/A;  Pre: Fe deficency anemia, nausea, heme positive stool   Post: gastritis, sloughing of distal esophagus mucosa    GALLBLADDER SURGERY      HEMORRHOIDECTOMY      HYSTERECTOMY      KIDNEY SURGERY  2013    Stent placement    MAZE PROCEDURE      MITRAL VALVE REPLACEMENT      TONSILLECTOMY      TRICUSPID VALVE REPLACEMENT       Family History   Adopted: Yes   Problem Relation Age of Onset    No Known Problems Mother     No Known Problems Father      Social History     Tobacco Use    Smoking status: Former     Current packs/day: 0.00     Average packs/day: 3.0 packs/day for 54.0 years (162.0 ttl pk-yrs)     Types: Cigarettes     Start date:      Quit date:      Years since quittin.1     Passive exposure: Past    Smokeless tobacco: Never    Tobacco comments:     caffeine - tea or mt dew    Vaping Use    Vaping status: Never Used   Substance Use Topics    Alcohol use: No    Drug use: No     No current facility-administered medications on file prior to encounter.     Current Outpatient Medications on File Prior to Encounter   Medication Sig Dispense Refill    albuterol sulfate  (90 Base) MCG/ACT inhaler Inhale 2 puffs Every 4 (Four) Hours As Needed for Wheezing. 18 g 3    amLODIPine (NORVASC) 10 MG  tablet TAKE 1 TABLET BY MOUTH EVERY DAY 90 tablet 1    aspirin 81 MG EC tablet Take 1 tablet by mouth Daily.      atorvastatin (LIPITOR) 40 MG tablet TAKE 1 TABLET BY MOUTH EVERY DAY 90 tablet 2    carvedilol (COREG) 6.25 MG tablet TAKE 1 TABLET BY MOUTH TWICE A DAY WITH MEALS 180 tablet 2    cyclobenzaprine (FLEXERIL) 10 MG tablet TAKE 1 TABLET BY MOUTH EVERYDAY AT BEDTIME 90 tablet 3    furosemide (LASIX) 40 MG tablet TAKE 1 TABLET BY MOUTH TWICE A  tablet 1    HYDROcodone-acetaminophen (NORCO) 7.5-325 MG per tablet Take 1 tablet by mouth Every 6 (Six) Hours As Needed for Moderate Pain (Chronic pain medicine for low back pain). 90 tablet 0    ipratropium-albuterol (DUO-NEB) 0.5-2.5 mg/3 ml nebulizer Take 3 mL by nebulization Every 4 (Four) Hours As Needed for Wheezing. 360 mL 3    lisinopril (PRINIVIL,ZESTRIL) 40 MG tablet Take 1 tablet by mouth Daily.      loratadine (CLARITIN) 10 MG tablet Take 1 tablet by mouth 2 (two) times a day.      Magnesium Oxide 400 (240 Mg) MG tablet TAKE 1 TABLET BY MOUTH EVERY DAY 90 tablet 1    Multiple Vitamins-Minerals (MULTIVITAL) tablet Take 1 tablet by mouth Daily.      Nebulizer device 1 Units 4 (Four) Times a Day As Needed (Wheezing). J44.1 j20.8 1 each 0    nystatin (MYCOSTATIN) 100,000 unit/mL suspension Swish and spit 5 mL 4 (Four) Times a Day. Retain in mouth as long as possible. 60 mL 0    O2 (OXYGEN) Inhale 2 L/min Continuous.      Omega-3 Fatty Acids (fish oil) 1000 MG capsule capsule Take  by mouth Every Night.      omeprazole (priLOSEC) 40 MG capsule TAKE 1 CAPSULE BY MOUTH EVERY DAY 90 capsule 1    ondansetron ODT (ZOFRAN-ODT) 4 MG disintegrating tablet Place 1 tablet on the tongue Every 8 (Eight) Hours As Needed for Nausea or Vomiting. 10 tablet 0    polyethylene glycol (MIRALAX) packet Take 17 g by mouth 2 (Two) Times a Day.      potassium chloride (K-DUR,KLOR-CON) 20 MEQ CR tablet 1 po tid 60 tablet 0    roflumilast (DALIRESP) 500 MCG tablet tablet Take 1  tablet by mouth Daily. 90 tablet 1    rOPINIRole (REQUIP) 2 MG tablet TAKE 1 TABLET BY MOUTH EVERY DAY AT NIGHT 90 tablet 2    sertraline (ZOLOFT) 100 MG tablet TAKE 1 TABLET BY MOUTH EVERY DAY 90 tablet 1    zolpidem (AMBIEN) 10 MG tablet Take 1 tablet by mouth At Night As Needed for Sleep. 30 tablet 3    benzonatate (TESSALON) 100 MG capsule TAKE 1 CAPSULE BY MOUTH 2 TIMES A DAY AS NEEDED FOR COUGH 180 capsule 1    budesonide (PULMICORT) 0.5 MG/2ML nebulizer solution Take 2 mL by nebulization 2 (Two) Times a Day for 30 days. Indications: Chronic Obstructive Lung Disease, Chronic hypoxic respiratory failure 360 mL 3    ondansetron (ZOFRAN) 4 MG tablet Take 1 tablet by mouth Every 12 (Twelve) Hours As Needed for Nausea or Vomiting. 90 tablet 3     Allergies   Allergen Reactions    Bupropion Itching    Cephalexin Itching     Tolerated piperacillin/tazobactam, ampicillin, cefdinir, and ceftriaxone    Metoprolol Itching       Objective    Objective     Vital Signs  Temp:  [98.1 °F (36.7 °C)-98.6 °F (37 °C)] 98.6 °F (37 °C)  Heart Rate:  [66-91] 84  Resp:  [16-18] 18  BP: (117-159)/(61-97) 159/69  SpO2:  [94 %-97 %] 96 %  on  Flow (L/min):  [2] 2;   Device (Oxygen Therapy): nasal cannula  Body mass index is 21.02 kg/m².    Physical Exam  Constitutional:       Appearance: She is ill-appearing.   Cardiovascular:      Rate and Rhythm: Normal rate.      Pulses: Normal pulses.   Pulmonary:      Effort: Pulmonary effort is normal. No respiratory distress.      Breath sounds: No stridor. No rhonchi.      Comments: Very mild expiratory wheezing  Abdominal:      General: There is no distension.      Palpations: Abdomen is soft.      Tenderness: There is no abdominal tenderness.   Musculoskeletal:      Right lower leg: No edema.      Left lower leg: No edema.   Neurological:      General: No focal deficit present.      Mental Status: She is alert and oriented to person, place, and time.   Psychiatric:         Mood and Affect:  Mood normal.         Behavior: Behavior normal.         Results Review:    I have personally reviewed:  [x]  Laboratory  [x]  Old records  [x]  Radiology   [x]  EKG/Telemetry   [x]  Microbiology    [x]  Cardiology/Vascular   []  Pathology     Lab Results (last 24 hours)       Procedure Component Value Units Date/Time    CBC & Differential [[id number]]  (Abnormal) Collected: 03/02/24 1820    Specimen: Blood Updated: 03/02/24 1830    Narrative:      The following orders were created for panel order CBC & Differential.  Procedure                               Abnormality         Status                     ---------                               -----------         ------                     CBC Auto Differential[[id number]]        Abnormal            Final result                 Please view results for these tests on the individual orders.    Comprehensive Metabolic Panel [[id number]]  (Abnormal) Collected: 03/02/24 1820    Specimen: Blood Updated: 03/02/24 1852     Glucose 115 mg/dL      BUN 11 mg/dL      Creatinine 0.81 mg/dL      Sodium 140 mmol/L      Potassium 3.7 mmol/L      Chloride 99 mmol/L      CO2 29.4 mmol/L      Calcium 8.6 mg/dL      Total Protein 6.3 g/dL      Albumin 3.9 g/dL      ALT (SGPT) 11 U/L      AST (SGOT) 14 U/L      Alkaline Phosphatase 94 U/L      Total Bilirubin 0.7 mg/dL      Globulin 2.4 gm/dL      A/G Ratio 1.6 g/dL      BUN/Creatinine Ratio 13.6     Anion Gap 11.6 mmol/L      eGFR 78.2 mL/min/1.73     Narrative:      GFR Normal >60  Chronic Kidney Disease <60  Kidney Failure <15      Lactic Acid, Plasma [[id number]]  (Normal) Collected: 03/02/24 1820    Specimen: Blood Updated: 03/02/24 1849     Lactate 1.1 mmol/L     Procalcitonin [[id number]]  (Normal) Collected: 03/02/24 1820    Specimen: Blood Updated: 03/02/24 1859     Procalcitonin 0.06 ng/mL     Narrative:      As a Marker for Sepsis (Non-Neonates):    1. <0.5 ng/mL represents a low risk of severe sepsis and/or septic shock.  2. >2  "ng/mL represents a high risk of severe sepsis and/or septic shock.    As a Marker for Lower Respiratory Tract Infections that require antibiotic therapy:    PCT on Admission    Antibiotic Therapy       6-12 Hrs later    >0.5                Strongly Recommended  >0.25 - <0.5        Recommended   0.1 - 0.25          Discouraged              Remeasure/reassess PCT  <0.1                Strongly Discouraged     Remeasure/reassess PCT    As 28 day mortality risk marker: \"Change in Procalcitonin Result\" (>80% or <=80%) if Day 0 (or Day 1) and Day 4 values are available. Refer to [email or web address]    Change in PCT <=80%  A decrease of PCT levels below or equal to 80% defines a positive change in PCT test result representing a higher risk for 28-day all-cause mortality of patients diagnosed with severe sepsis for septic shock.    Change in PCT >80%  A decrease of PCT levels of more than 80% defines a negative change in PCT result representing a lower risk for 28-day all-cause mortality of patients diagnosed with severe sepsis or septic shock.       Single High Sensitivity Troponin T [[id number]]  (Abnormal) Collected: 03/02/24 1820    Specimen: Blood Updated: 03/02/24 1859     HS Troponin T 27 ng/L     Narrative:      High Sensitive Troponin T Reference Range:  <14.0 ng/L- Negative Female for AMI  <22.0 ng/L- Negative Male for AMI  >=14 - Abnormal Female indicating possible myocardial injury.  >=22 - Abnormal Male indicating possible myocardial injury.   Clinicians would have to utilize clinical acumen, EKG, Troponin, and serial changes to determine if it is an Acute Myocardial Infarction or myocardial injury due to an underlying chronic condition.         BNP [[id number]]  (Normal) Collected: 03/02/24 1820    Specimen: Blood Updated: 03/02/24 1859     proBNP 892.0 pg/mL     Narrative:      This assay is used as an aid in the diagnosis of individuals suspected of having heart failure. It can be used as an " aid in the diagnosis of acute decompensated heart failure (ADHF) in patients presenting with signs and symptoms of ADHF to the emergency department (ED). In addition, NT-proBNP of <300 pg/mL indicates ADHF is not likely.    Age Range Result Interpretation  NT-proBNP Concentration (pg/mL:      <50             Positive            >450                   Gray                 300-450                    Negative             <300    50-75           Positive            >900                  Gray                300-900                  Negative            <300      >75             Positive            >1800                  Gray                300-1800                  Negative            <300    CBC Auto Differential [[id number]]  (Abnormal) Collected: 03/02/24 1820    Specimen: Blood Updated: 03/02/24 1830     WBC 14.18 10*3/mm3      RBC 4.09 10*6/mm3      Hemoglobin 10.9 g/dL      Hematocrit 34.3 %      MCV 83.9 fL      MCH 26.7 pg      MCHC 31.8 g/dL      RDW 12.5 %      RDW-SD 38.0 fl      MPV 10.0 fL      Platelets 231 10*3/mm3      Neutrophil % 85.6 %      Lymphocyte % 6.8 %      Monocyte % 4.4 %      Eosinophil % 2.4 %      Basophil % 0.2 %      Immature Grans % 0.6 %      Neutrophils, Absolute 12.13 10*3/mm3      Lymphocytes, Absolute 0.97 10*3/mm3      Monocytes, Absolute 0.63 10*3/mm3      Eosinophils, Absolute 0.34 10*3/mm3      Basophils, Absolute 0.03 10*3/mm3      Immature Grans, Absolute 0.08 10*3/mm3      nRBC 0.0 /100 WBC     Respiratory Panel PCR w/COVID-19(SARS-CoV-2) KAJAL/MARILIN/DEBBIE/PAD/COR/DAVID In-House, NP Swab in [facility] 2 HR TAT - Swab, Nasopharynx [[id number]]  (Normal) Collected: 03/02/24 1821    Specimen: Swab from Nasopharynx Updated: 03/02/24 1921     ADENOVIRUS, PCR Not Detected     Coronavirus 229E Not Detected     Coronavirus HKU1 Not Detected     Coronavirus NL63 Not Detected     Coronavirus OC43 Not Detected     COVID19 Not Detected     Human Metapneumovirus Not Detected     Human  Rhinovirus/Enterovirus Not Detected     Influenza A PCR Not Detected     Influenza B PCR Not Detected     Parainfluenza Virus 1 Not Detected     Parainfluenza Virus 2 Not Detected     Parainfluenza Virus 3 Not Detected     Parainfluenza Virus 4 Not Detected     RSV, PCR Not Detected     Bordetella pertussis pcr Not Detected     Bordetella parapertussis PCR Not Detected     Chlamydophila pneumoniae PCR Not Detected     Mycoplasma pneumo by PCR Not Detected    Narrative:      In the setting of a positive respiratory panel with a viral infection PLUS a negative procalcitonin without other underlying concern for bacterial infection, consider observing off antibiotics or discontinuation of antibiotics and continue supportive care. If the respiratory panel is positive for atypical bacterial infection (Bordetella pertussis, Chlamydophila pneumoniae, or Mycoplasma pneumoniae), consider antibiotic de-escalation to target atypical bacterial infection.    Blood Culture - Blood, Arm, Right [[id number]] Collected: 03/02/24 2007    Specimen: Blood from Arm, Right Updated: 03/02/24 2049    Blood Culture - Blood, Arm, Left [[id number]] Collected: 03/02/24 2007    Specimen: Blood from Arm, Left Updated: 03/02/24 2049    High Sensitivity Troponin T [[id number]]  (Abnormal) Collected: 03/02/24 2205    Specimen: Blood Updated: 03/02/24 2240     HS Troponin T 23 ng/L     Narrative:      High Sensitive Troponin T Reference Range:  <14.0 ng/L- Negative Female for AMI  <22.0 ng/L- Negative Male for AMI  >=14 - Abnormal Female indicating possible myocardial injury.  >=22 - Abnormal Male indicating possible myocardial injury.   Clinicians would have to utilize clinical acumen, EKG, Troponin, and serial changes to determine if it is an Acute Myocardial Infarction or myocardial injury due to an underlying chronic condition.         Legionella Antigen, Urine - Urine, Urine, Clean Catch [[id number]] Collected: 03/03/24 0006    Specimen: Urine, Clean  Catch Updated: 03/03/24 0015    S. Pneumo Ag Urine or CSF - Urine, Urine, Clean Catch [[id number]] Collected: 03/03/24 0006    Specimen: Urine, Clean Catch Updated: 03/03/24 0015    High Sensitivity Troponin T 2Hr [[id number]] Collected: 03/03/24 0006    Specimen: Blood Updated: 03/03/24 0009            Imaging Results (Last 24 Hours)       Procedure Component Value Units Date/Time    CT Chest Without Contrast Diagnostic [[id number]] Collected: 03/02/24 1930     Updated: 03/02/24 1943    Narrative:      CT OF THE CHEST WITHOUT CONTRAST     HISTORY: Cough and fever     COMPARISON: November 17, 2023     TECHNIQUE: Axial CT imaging was obtained through the thorax. No IV  contrast was administered.     FINDINGS:  Right apical scarring appears stable. There are advanced background  emphysematous changes. There is some vague groundglass attenuation which  is noted within the left lower lobe, which is more apparent than on  prior exam, and which may represent an acute infectious or inflammatory  process. The thyroid gland and trachea are within normal limits. There  is a small hiatal hernia. Patient is status post mitral and tricuspid  valve replacement. Mediastinal lymph nodes do not appear pathologically  enlarged. Thoracic aorta is normal in caliber. There are coronary artery  calcifications. Images through the upper abdomen do not demonstrate any  acute abnormalities. No acute osseous abnormalities are seen.          Impression:      The patient has some vague groundglass attenuation within the left lower  lobe. An acute infectious or inflammatory process is not excluded.     Radiation dose reduction techniques were utilized, including automated  exposure control and exposure modulation based on body size.        This report was finalized on 3/2/2024 7:40 PM by Dr. Leigh Parr M.D on Workstation: [id number]       XR Chest 1 View [[id number]] Collected: 03/02/24 1839     Updated: 03/02/24 1843    Narrative:      XR  CHEST 1 VW-     HISTORY: 68-year-old female with cough and fever.     FINDINGS: Advanced emphysema. There is increased density and markings at  the lingula suspicious for acute pneumonia and there may be an acute  infiltrate at the medial aspect of the right lower lobe superimposed on  chronic change as well. There is no evidence for effusions or CHF.     This report was finalized on 3/2/2024 6:40 PM by Dr. Franchesca Marrufo M.D on  Workstation: [id number]               Results for orders placed during the hospital encounter of 02/16/22    Adult Transthoracic Echo Complete W/ Cont if Necessary Per Protocol    Interpretation Summary  · Calculated left ventricular EF = 54.8% Estimated left ventricular EF was in agreement with the calculated left ventricular EF. Left ventricular systolic function is normal. Septal wall motion is abnormal, consistent with a post-operative state. Normal left ventricular cavity size noted. Left ventricular wall thickness is consistent with mild concentric hypertrophy. Left ventricular diastolic function was indeterminate.  · The right ventricular cavity is mildly dilated. Normal right ventricular systolic function noted.  · The left atrial cavity is mildly dilated.  · Right atrium not well visualized.  · The aortic valve is abnormal in structure. There is mild to moderate thickening of the aortic valve. The aortic valve appears trileaflet. Trace aortic valve regurgitation is present. No hemodynamically significant aortic valve stenosis is present.  · No significant mitral valve regurgitation is present. There is a 31 mm, porcine, [employer] bioprosthetic mitral valve present. The mitral valve peak and mean gradients are within defined limits. The prosthetic mitral valve is grossly normal.  · Mild to moderate tricuspid valve regurgitation is present. Estimated right ventricular systolic pressure from tricuspid regurgitation is moderately elevated (45-55 mmHg). Calculated right ventricular systolic  pressure from tricuspid regurgitation is 48.0 mmHg. No evidence of significant tricuspid valve stenosis is present. There is a tricuspid ring valve present.      ECG 12 Lead Dyspnea   Final Result   HEART RATE= 79  bpm   RR Interval= 759  ms   TX Interval=   ms   P Horizontal Axis=   deg   P Front Axis=   deg   QRSD Interval= 106  ms   QT Interval= 423  ms   QTcB= 486  ms   QRS Axis= 30  deg   T Wave Axis= 62  deg   - ABNORMAL ECG -   Atrial fibrillation   RSR' in V1 or V2, right VCD or RVH   When compared with ECG of 17-Feb-2024 12:02:36,   Atrial fibrillation has replaced sinus rhythm    Electronically Signed By: Rick Jacques ([facility]) 02-Mar-2024 22:09:14   Date and Time of Study: 2024-03-02 18:30:02           Assessment/Plan     Active Hospital Problems    Diagnosis  POA    **Community acquired pneumonia [J18.9]  Yes    Leukocytosis [D72.829]  Yes    Elevated troponin [R79.89]  Yes    COPD exacerbation [J44.1]  Yes    Chronic diastolic CHF (congestive heart failure) [I50.32]  Yes    History of endocarditis [Z86.79]  Not Applicable    Chronic hypoxemic respiratory failure [J96.11]  Yes    Chronic low back pain [M54.50, G89.29]  Yes    S/P TVR (tricuspid valve repair) [Z98.890]  Not Applicable    Pulmonary hypertension [I27.20]  Yes    Hypertension [I10]  Yes    Hyperlipidemia [E78.5]  Yes    PAF (paroxysmal atrial fibrillation) [I48.0]  Yes      Resolved Hospital Problems   No resolved problems to display.       Ms. Browne is a 68 y.o. female with COPD (2L O2), HFpEF, HLD, HTN, status post bioprosthetic MVR, ALEJANDRA closure, A-fib (previously on warfarin, stopped due to GIB as well as maintenance of sinus rhythm) presenting with shortness of breath, fever.    Community-acquired pneumonia   Chronic respiratory failure (2 L O2)  COPD exacerbation  Leukocytosis  Fevers  -CT chest with groundglass attenuation within left lower lobe  -WBC 14  -Received steroids in ED; minimal wheezing on exam, transition to p.o.  tomorrow  -scheduled nebs  -check urine Lg, Strep  -Rocephin, azithromycin    Elevated troponin  -trop 27, repeat pending  -EKG no acute ischemia  -likely demand ischemia related to above  -Given dyspnea and history below, will repeat TTE    Status post mitral valve replacement, tricuspid valve repair  History endocarditis  Paroxysmal A-fib  -Follows with Dr. Null  -RC: BB  -AC: Previously on warfarin, had GIB and was discontinued    HTN  -Amlodipine, Coreg, lisinopril, Lasix        I discussed the patient's findings and my recommendations with patient and ED provider.    VTE Prophylaxis - Lovenox 40 mg SC daily.  Code Status - Full code.       Sergei Burgess MD  [facility]  03/03/24  00:31 EST    Documentation delayed- pt was interviewed and examined on 03/02/2024

## 2024-03-03 NOTE — CONSULTS
Patient Identification:  Paz Browne  68 y.o.  female  1955  [id number]          LOS 0    Requesting physician: Dr. Chavez    Reason for Consultation: Acute exacerbation of COPD and pneumonia    History of Present Illness:   68-year-old female with a history of COPD and 2 L of oxygen requiring at home admitted by hospitalist service with shortness of breath and fever.  We have been consulted to help with pneumonia management and COPD exacerbation.  Complains of cough and shortness of breath.  No fevers or chills.  Currently on Rocephin.  Chart reviewed.    Past Medical History:  Past Medical History:   Diagnosis Date    VAN (acute kidney injury)     Anemia     Asthma     Atrial flutter     cardioversion    Cataract     Celiac artery stenosis     Chronic respiratory failure with hypoxia     Colon polyp     COPD (chronic obstructive pulmonary disease)     GI bleed     Hiatal hernia     History of CHF (congestive heart failure)     due to MR    History of home oxygen therapy     3 lpm NC    History of mitral valve replacement with tissue graft     Hyperlipidemia     Hypertension     Infectious viral hepatitis     B    Intertrigo     Long term (current) use of anticoagulants     Mitral regurgitation     s/p tissue MVR    PAF (paroxysmal atrial fibrillation)     s/p MAZE    Pneumonia     Pulmonary hypertension     S/P TVR (tricuspid valve repair) 7/7/2016       Past Surgical History:  Past Surgical History:   Procedure Laterality Date    CARDIAC CATHETERIZATION  09/01/2014    Right dominant systemt, normal coronary arteries.     CARDIAC CATHETERIZATION Left 6/10/2016    Procedure: Cardiac catheterization;  Surgeon: Sergei Hall MD;  Location:  [facility];  Service:     CARDIAC CATHETERIZATION N/A 6/10/2016    Procedure: Right Heart Cath;  Surgeon: Sergei Hall MD;  Location:  [facility];  Service:     CATARACT EXTRACTION      COLONOSCOPY      COLONOSCOPY N/A 8/4/2017     Procedure: COLONOSCOPY TO CECUM/TI WITH POLYPECTOMY ( COLD BX);  Surgeon: Cleveland Devine MD;  Location: [facility] ENDOSCOPY;  Service:     COLONOSCOPY N/A 8/10/2017    Procedure: COLONOSCOPY to cecum and TI with 2 clips placed at transverse;  Surgeon: Earnest PALOMO MD;  Location: [facility] ENDOSCOPY;  Service:     COLONOSCOPY N/A 12/22/2017    Procedure: COLONOSCOPY INTO CECUM WITH COLD POLYPECTOMIES;  Surgeon: Cleveland Devine MD;  Location: [facility] ENDOSCOPY;  Service:     COLONOSCOPY N/A 5/17/2021    Procedure: COLONOSCOPY to cecum;  Surgeon: Cleveland Devine MD;  Location: [facility] ENDOSCOPY;  Service: Gastroenterology;  Laterality: N/A;  Pre: Fe deficency anemia, h/x of polyps  Post: fair prep, normal    ENDOSCOPY N/A 8/17/2017    Procedure: ESOPHAGOGASTRODUODENOSCOPY;  Surgeon: Porsha Ruby MD;  Location: [facility] ENDOSCOPY;  Service:     ENDOSCOPY N/A 12/22/2017    Procedure: ESOPHAGOGASTRODUODENOSCOPY WITH BIOPSIES;  Surgeon: Cleveland Devine MD;  Location: [facility] ENDOSCOPY;  Service:     ENDOSCOPY N/A 5/17/2021    Procedure: ESOPHAGOGASTRODUODENOSCOPY  with biopsies;  Surgeon: Cleveland Devine MD;  Location: [facility] ENDOSCOPY;  Service: Gastroenterology;  Laterality: N/A;  Pre: Fe deficency anemia, nausea, heme positive stool   Post: gastritis, sloughing of distal esophagus mucosa    GALLBLADDER SURGERY      HEMORRHOIDECTOMY      HYSTERECTOMY      KIDNEY SURGERY  04/22/2013    Stent placement    MAZE PROCEDURE      MITRAL VALVE REPLACEMENT      TONSILLECTOMY      TRICUSPID VALVE REPLACEMENT          Home Meds:  Medications Prior to Admission   Medication Sig Dispense Refill Last Dose    albuterol sulfate  (90 Base) MCG/ACT inhaler Inhale 2 puffs Every 4 (Four) Hours As Needed for Wheezing. 18 g 3     amLODIPine (NORVASC) 10 MG tablet TAKE 1 TABLET BY MOUTH EVERY DAY 90 tablet 1     aspirin 81 MG EC tablet Take 1 tablet by mouth Daily.       atorvastatin (LIPITOR) 40 MG tablet TAKE 1 TABLET BY MOUTH EVERY DAY 90  tablet 2     carvedilol (COREG) 6.25 MG tablet TAKE 1 TABLET BY MOUTH TWICE A DAY WITH MEALS 180 tablet 2     cyclobenzaprine (FLEXERIL) 10 MG tablet TAKE 1 TABLET BY MOUTH EVERYDAY AT BEDTIME 90 tablet 3     furosemide (LASIX) 40 MG tablet TAKE 1 TABLET BY MOUTH TWICE A  tablet 1     HYDROcodone-acetaminophen (NORCO) 7.5-325 MG per tablet Take 1 tablet by mouth Every 6 (Six) Hours As Needed for Moderate Pain (Chronic pain medicine for low back pain). 90 tablet 0     ipratropium-albuterol (DUO-NEB) 0.5-2.5 mg/3 ml nebulizer Take 3 mL by nebulization Every 4 (Four) Hours As Needed for Wheezing. 360 mL 3     lisinopril (PRINIVIL,ZESTRIL) 40 MG tablet Take 1 tablet by mouth Daily.       loratadine (CLARITIN) 10 MG tablet Take 1 tablet by mouth 2 (two) times a day.       Magnesium Oxide 400 (240 Mg) MG tablet TAKE 1 TABLET BY MOUTH EVERY DAY 90 tablet 1     Multiple Vitamins-Minerals (MULTIVITAL) tablet Take 1 tablet by mouth Daily.       Nebulizer device 1 Units 4 (Four) Times a Day As Needed (Wheezing). J44.1 j20.8 1 each 0     nystatin (MYCOSTATIN) 100,000 unit/mL suspension Swish and spit 5 mL 4 (Four) Times a Day. Retain in mouth as long as possible. 60 mL 0     O2 (OXYGEN) Inhale 2 L/min Continuous.       Omega-3 Fatty Acids (fish oil) 1000 MG capsule capsule Take  by mouth Every Night.       omeprazole (priLOSEC) 40 MG capsule TAKE 1 CAPSULE BY MOUTH EVERY DAY 90 capsule 1     ondansetron ODT (ZOFRAN-ODT) 4 MG disintegrating tablet Place 1 tablet on the tongue Every 8 (Eight) Hours As Needed for Nausea or Vomiting. 10 tablet 0     polyethylene glycol (MIRALAX) packet Take 17 g by mouth 2 (Two) Times a Day.       potassium chloride (K-DUR,KLOR-CON) 20 MEQ CR tablet 1 po tid 60 tablet 0     roflumilast (DALIRESP) 500 MCG tablet tablet Take 1 tablet by mouth Daily. 90 tablet 1     rOPINIRole (REQUIP) 2 MG tablet TAKE 1 TABLET BY MOUTH EVERY DAY AT NIGHT 90 tablet 2     sertraline (ZOLOFT) 100 MG tablet TAKE 1  TABLET BY MOUTH EVERY DAY 90 tablet 1     zolpidem (AMBIEN) 10 MG tablet Take 1 tablet by mouth At Night As Needed for Sleep. 30 tablet 3     benzonatate (TESSALON) 100 MG capsule TAKE 1 CAPSULE BY MOUTH 2 TIMES A DAY AS NEEDED FOR COUGH 180 capsule 1     budesonide (PULMICORT) 0.5 MG/2ML nebulizer solution Take 2 mL by nebulization 2 (Two) Times a Day for 30 days. Indications: Chronic Obstructive Lung Disease, Chronic hypoxic respiratory failure 360 mL 3     ondansetron (ZOFRAN) 4 MG tablet Take 1 tablet by mouth Every 12 (Twelve) Hours As Needed for Nausea or Vomiting. 90 tablet 3          Allergies:  Allergies   Allergen Reactions    Bupropion Itching    Cephalexin Itching     Tolerated piperacillin/tazobactam, ampicillin, cefdinir, and ceftriaxone    Metoprolol Itching       Social History:   Social History     Socioeconomic History    Marital status:     Number of children: 2   Tobacco Use    Smoking status: Former     Current packs/day: 0.00     Average packs/day: 3.0 packs/day for 54.0 years (162.0 ttl pk-yrs)     Types: Cigarettes     Start date:      Quit date:      Years since quittin.1     Passive exposure: Past    Smokeless tobacco: Never    Tobacco comments:     caffeine - tea or mt dew    Vaping Use    Vaping status: Never Used   Substance and Sexual Activity    Alcohol use: No    Drug use: No    Sexual activity: Defer       Family History:  Family History   Adopted: Yes   Problem Relation Age of Onset    No Known Problems Mother     No Known Problems Father        Review of Systems:  Denies fevers or chills  Denies nausea or vomiting  No new vision or hearing changes  No chest pain  No productive cough or shortness of breath  No diarrhea, hematemesis or hematochezia, no dysuria or frequency  No musculoskeletal complaints  No heat or cold intolerance  No skin rashes  No dizziness or confusion.  No seizure activity  No new anxiety or depression  12 system review of systems performed  "and all else negative    Objective:    PHYSICAL EXAM:    BP (!) 132/112 (BP Location: Right arm, Patient Position: Lying)   Pulse 85   Temp 98.1 °F (36.7 °C) (Oral)   Resp 18   Ht 170 cm (66.93\")   Wt 59.7 kg (131 lb 9.8 oz)   SpO2 96%   BMI 20.66 kg/m²  Body mass index is 20.66 kg/m². 96% 59.7 kg (131 lb 9.8 oz)    GENERAL APPEARANCE:   Well developed  Well nourished  No acute distress   EYES:    PERRL                                                                           Conjunctivae normal  Sclerae nonicteric.  HENT:   Atraumatic, normocephalic  External ears and nose normal  Moist mucous membranes and no ulcers  NECK:  Thyroid not enlarged  Trachea midline   RESPIRATORY:    Nonlabored breathing   Diminished breath sounds bilaterally on auscultation   No rales. + wheezing  No dullness  CARDIOVASCULAR:    RRR  Normal S1, S2  No murmur  Lower extremity edema: none    GI:   Bowel sounds normal  Abdomen soft , nondistended, nontender  No abdominal masses  MUSCULOSKELETAL:  Normal movement of extremities  No tenderness, no deformities  No clubbing or cyanosis   Skin:    No visible rashes  No palpable nodules  Cap refill normal.  No mottling.   PSYCHIATRIC:  Speech and behavior appropriate  Normal mood and affect  Oriented to person, place and time  NEUROLOGIC:  Cranial nerves II through XII grossly intact.  Sensation intact.      Lab Review:      Results from last 7 days   Lab Units 03/03/24  0438 03/02/24  1820   WBC 10*3/mm3 7.80 14.18*   HEMOGLOBIN g/dL 10.5* 10.9*   HEMATOCRIT % 33.2* 34.3   PLATELETS 10*3/mm3 241 231     Results from last 7 days   Lab Units 03/03/24  0438 03/02/24  1820   SODIUM mmol/L 141 140   POTASSIUM mmol/L 4.4 3.7   CHLORIDE mmol/L 99 99   CO2 mmol/L 26.8 29.4*   BUN mg/dL 9 11   CREATININE mg/dL 0.75 0.81   CALCIUM mg/dL 9.3 8.6   BILIRUBIN mg/dL  --  0.7   ALK PHOS U/L  --  94   ALT (SGPT) U/L  --  11   AST (SGOT) U/L  --  14   GLUCOSE mg/dL 170* 115*         Procalitonin " Results:      Lab 03/02/24  1820   PROCALCITONIN 0.06         Lab 03/02/24  1820   LACTATE 1.1                  Imaging reviewed  chest X-ray and CT chest viewed   Imaging shows diffuse significant emphysema.  Left lower lobe groundglass airspace disease noted.      2D echo reviewed: EF 73%.  Bioprosthetic valve mitral valve noted.  Mobile mass posterior leaflets consistent with vegetation is seen.  Mild aortic stenosis.    Assessment:  Community-acquired pneumonia  Acute exacerbation of COPD  Chronic diastolic CHF  Atrial flutter  Valvular heart disease with aortic stenosis and bioprosthetic mitral valve with signs of vegetation on echo  Pulmonary hypertension  Celiac artery stenosis        Recommendations:  Increase frequency of nebulized bronchodilators.    Change steroids to IV.    Wean oxygen as able.    Agree with current antibiotics.  Cardiology being consulted for heart disease and vegetation.      Thank you for allowing me to participate in the care of this patient.  I will continue to follow along with you.      Luis A Murray MD  [facility], [facility]  Pulmonary and Critical Care Medicine    3/3/2024  16:07 EST

## 2024-03-03 NOTE — ED PROVIDER NOTES
SHARED VISIT: This visit was performed by BOTH a physician and an APC. The substantive portion of the medical decision making was performed by this attesting physician who made or approved the management plan and takes responsibility for patient management. All studies in the APC note (if performed) were independently interpreted by me.      The MILAN and I have discussed this patient's history, physical exam, and treatment plan.  I have reviewed the documentation and personally had a face to face interaction with the patient. I affirm the documentation and agree with the treatment and plan.  The attached note describes my personal findings.       I provided a substantive portion of the care of the patient.  I personally performed the physical exam in its entirety, and below are my findings.        History  5-year-old female with history of complicated past medical history including COPD, CHF and prior endocarditis presents with cough and shortness of breath.  Had temperature of 101 last night.    Physical Exam  Vital Signs reviewed  GENERAL: 68-year-old female who appears older than stated age.  Triage vitals reviewed and are notable for O2 sats of 94% on 2 L nasal cannula  HENT: nares patent  EYES: no scleral icterus  CV: regular rhythm, regular rate  RESPIRATORY: Mild tachypnea with decreased breath sounds bilaterally-poor air movement.  O2 sats  ABDOMEN: soft  MUSCULOSKELETAL: no deformity  NEURO: Strength sensation and coordination are grossly intact.  Speech and mentation are unremarkable  SKIN: warm, dry      Assessment and Data Review    ED Course as of 03/11/24 0805   Sat Mar 02, 2024   1831 WBC(!): 14.18 [DC]   1831 Hemoglobin(!): 10.9 [DC]   1901 Lactate: 1.1 [DC]   1901 Procalcitonin: 0.06 [DC]   1901 Creatinine: 0.81 [DC]   1901 Sodium: 140 [DC]   1901 Potassium: 3.7 [DC]   1955 Discussed case with Dr. Burgess, [facility], who will admit the patient. [DC]      ED Course User Index  [DC] Irina Cook PA          Chest x-ray independently interpreted by me shows some bilateral lower lobe densities consistent with likely pneumonia.  Lab work notable for elevated white count of 14.2.  Viral panel negative for tested pathogens.    EKG independently interpreted by me  Time 1830  Sinus with frequent P ACs, rate 79  P waves-poorly visualized but suspect there are normal P waves and normal TX interval  QRS-normal axis, normal QRS  ST, T waves-nonspecific changes  Compared to prior amount of PACs is increased.    I spoke with HUY Cook about this patient and will plan at admission for further treatment and evaluation of pneumonia and complicated patient with underlying COPD, history of infective endocarditis.     Fma Sosa MD  03/02/24 2020       Fam Sosa MD  03/11/24 0805

## 2024-03-03 NOTE — PLAN OF CARE
Goal Outcome Evaluation:              Outcome Evaluation: VSS. O2 SAT STABLE ON HOME DOSE OF O2 AT 2 L/M N/C. PAIN PILL AND SLEEPING PILL AT HS AND THEN RESTED AT LONG INTERVALS WITHOUT C/O'S. TROPONIN TRENDING DOWN.

## 2024-03-04 LAB
ANION GAP SERPL CALCULATED.3IONS-SCNC: 11.4 MMOL/L (ref 5–15)
BACTERIA BLD CULT: ABNORMAL
BASOPHILS # BLD AUTO: 0.01 10*3/MM3 (ref 0–0.2)
BASOPHILS NFR BLD AUTO: 0.1 % (ref 0–1.5)
BOTTLE TYPE: ABNORMAL
BUN SERPL-MCNC: 11 MG/DL (ref 8–23)
BUN/CREAT SERPL: 14.7 (ref 7–25)
CALCIUM SPEC-SCNC: 9.1 MG/DL (ref 8.6–10.5)
CHLORIDE SERPL-SCNC: 100 MMOL/L (ref 98–107)
CO2 SERPL-SCNC: 28.6 MMOL/L (ref 22–29)
CREAT SERPL-MCNC: 0.75 MG/DL (ref 0.57–1)
DEPRECATED RDW RBC AUTO: 37.8 FL (ref 37–54)
EGFRCR SERPLBLD CKD-EPI 2021: 85.8 ML/MIN/1.73
EOSINOPHIL # BLD AUTO: 0 10*3/MM3 (ref 0–0.4)
EOSINOPHIL NFR BLD AUTO: 0 % (ref 0.3–6.2)
ERYTHROCYTE [DISTWIDTH] IN BLOOD BY AUTOMATED COUNT: 12.3 % (ref 12.3–15.4)
GLUCOSE SERPL-MCNC: 145 MG/DL (ref 65–99)
HCT VFR BLD AUTO: 31.1 % (ref 34–46.6)
HEMOCCULT STL QL: NEGATIVE
HGB BLD-MCNC: 10.1 G/DL (ref 12–15.9)
IMM GRANULOCYTES # BLD AUTO: 0.06 10*3/MM3 (ref 0–0.05)
IMM GRANULOCYTES NFR BLD AUTO: 0.4 % (ref 0–0.5)
LYMPHOCYTES # BLD AUTO: 0.7 10*3/MM3 (ref 0.7–3.1)
LYMPHOCYTES NFR BLD AUTO: 4.8 % (ref 19.6–45.3)
MCH RBC QN AUTO: 27.4 PG (ref 26.6–33)
MCHC RBC AUTO-ENTMCNC: 32.5 G/DL (ref 31.5–35.7)
MCV RBC AUTO: 84.5 FL (ref 79–97)
MONOCYTES # BLD AUTO: 0.23 10*3/MM3 (ref 0.1–0.9)
MONOCYTES NFR BLD AUTO: 1.6 % (ref 5–12)
NEUTROPHILS NFR BLD AUTO: 13.66 10*3/MM3 (ref 1.7–7)
NEUTROPHILS NFR BLD AUTO: 93.1 % (ref 42.7–76)
NRBC BLD AUTO-RTO: 0 /100 WBC (ref 0–0.2)
PLATELET # BLD AUTO: 243 10*3/MM3 (ref 140–450)
PMV BLD AUTO: 10.3 FL (ref 6–12)
POTASSIUM SERPL-SCNC: 3.8 MMOL/L (ref 3.5–5.2)
RBC # BLD AUTO: 3.68 10*6/MM3 (ref 3.77–5.28)
SODIUM SERPL-SCNC: 140 MMOL/L (ref 136–145)
WBC NRBC COR # BLD AUTO: 14.66 10*3/MM3 (ref 3.4–10.8)

## 2024-03-04 PROCEDURE — 99215 OFFICE O/P EST HI 40 MIN: CPT | Performed by: INTERNAL MEDICINE

## 2024-03-04 PROCEDURE — G0378 HOSPITAL OBSERVATION PER HR: HCPCS

## 2024-03-04 PROCEDURE — 85025 COMPLETE CBC W/AUTO DIFF WBC: CPT | Performed by: INTERNAL MEDICINE

## 2024-03-04 PROCEDURE — 25010000002 ENOXAPARIN PER 10 MG: Performed by: STUDENT IN AN ORGANIZED HEALTH CARE EDUCATION/TRAINING PROGRAM

## 2024-03-04 PROCEDURE — 25010000002 CEFTRIAXONE PER 250 MG: Performed by: STUDENT IN AN ORGANIZED HEALTH CARE EDUCATION/TRAINING PROGRAM

## 2024-03-04 PROCEDURE — 94664 DEMO&/EVAL PT USE INHALER: CPT

## 2024-03-04 PROCEDURE — 25010000002 ONDANSETRON PER 1 MG: Performed by: STUDENT IN AN ORGANIZED HEALTH CARE EDUCATION/TRAINING PROGRAM

## 2024-03-04 PROCEDURE — 82272 OCCULT BLD FECES 1-3 TESTS: CPT | Performed by: INTERNAL MEDICINE

## 2024-03-04 PROCEDURE — 25010000002 METHYLPREDNISOLONE PER 125 MG: Performed by: INTERNAL MEDICINE

## 2024-03-04 PROCEDURE — 80048 BASIC METABOLIC PNL TOTAL CA: CPT | Performed by: INTERNAL MEDICINE

## 2024-03-04 PROCEDURE — 99214 OFFICE O/P EST MOD 30 MIN: CPT | Performed by: INTERNAL MEDICINE

## 2024-03-04 PROCEDURE — 94761 N-INVAS EAR/PLS OXIMETRY MLT: CPT

## 2024-03-04 PROCEDURE — 25010000002 ENOXAPARIN PER 10 MG: Performed by: INTERNAL MEDICINE

## 2024-03-04 PROCEDURE — 96372 THER/PROPH/DIAG INJ SC/IM: CPT

## 2024-03-04 PROCEDURE — 94799 UNLISTED PULMONARY SVC/PX: CPT

## 2024-03-04 PROCEDURE — 96376 TX/PRO/DX INJ SAME DRUG ADON: CPT

## 2024-03-04 RX ORDER — ENOXAPARIN SODIUM 100 MG/ML
1 INJECTION SUBCUTANEOUS EVERY 12 HOURS
Status: DISCONTINUED | OUTPATIENT
Start: 2024-03-04 | End: 2024-03-06

## 2024-03-04 RX ADMIN — FUROSEMIDE 40 MG: 40 TABLET ORAL at 09:44

## 2024-03-04 RX ADMIN — IPRATROPIUM BROMIDE AND ALBUTEROL SULFATE 3 ML: .5; 3 SOLUTION RESPIRATORY (INHALATION) at 23:31

## 2024-03-04 RX ADMIN — ROPINIROLE 2 MG: 2 TABLET, FILM COATED ORAL at 20:18

## 2024-03-04 RX ADMIN — HYDROCODONE BITARTRATE AND ACETAMINOPHEN 1 TABLET: 7.5; 325 TABLET ORAL at 22:45

## 2024-03-04 RX ADMIN — BUDESONIDE 0.5 MG: 0.5 INHALANT ORAL at 07:28

## 2024-03-04 RX ADMIN — Medication 1 TABLET: at 09:47

## 2024-03-04 RX ADMIN — ASPIRIN 81 MG: 81 TABLET, COATED ORAL at 09:44

## 2024-03-04 RX ADMIN — ENOXAPARIN SODIUM 40 MG: 100 INJECTION SUBCUTANEOUS at 09:47

## 2024-03-04 RX ADMIN — HYDROCODONE BITARTRATE AND ACETAMINOPHEN 1 TABLET: 7.5; 325 TABLET ORAL at 09:56

## 2024-03-04 RX ADMIN — ZOLPIDEM TARTRATE 10 MG: 5 TABLET ORAL at 22:45

## 2024-03-04 RX ADMIN — METHYLPREDNISOLONE SODIUM SUCCINATE 60 MG: 125 INJECTION, POWDER, FOR SOLUTION INTRAMUSCULAR; INTRAVENOUS at 17:33

## 2024-03-04 RX ADMIN — NYSTATIN 500000 UNITS: 100000 SUSPENSION ORAL at 13:40

## 2024-03-04 RX ADMIN — METHYLPREDNISOLONE SODIUM SUCCINATE 60 MG: 125 INJECTION, POWDER, FOR SOLUTION INTRAMUSCULAR; INTRAVENOUS at 09:46

## 2024-03-04 RX ADMIN — IPRATROPIUM BROMIDE AND ALBUTEROL SULFATE 3 ML: .5; 3 SOLUTION RESPIRATORY (INHALATION) at 16:13

## 2024-03-04 RX ADMIN — CETIRIZINE HYDROCHLORIDE 10 MG: 10 TABLET ORAL at 09:43

## 2024-03-04 RX ADMIN — GUAIFENESIN 1200 MG: 600 TABLET, EXTENDED RELEASE ORAL at 20:18

## 2024-03-04 RX ADMIN — ROFLUMILAST 500 MCG: 500 TABLET ORAL at 09:45

## 2024-03-04 RX ADMIN — NYSTATIN 500000 UNITS: 100000 SUSPENSION ORAL at 09:53

## 2024-03-04 RX ADMIN — METHYLPREDNISOLONE SODIUM SUCCINATE 60 MG: 125 INJECTION, POWDER, FOR SOLUTION INTRAMUSCULAR; INTRAVENOUS at 00:03

## 2024-03-04 RX ADMIN — ONDANSETRON 4 MG: 2 INJECTION INTRAMUSCULAR; INTRAVENOUS at 17:45

## 2024-03-04 RX ADMIN — POLYETHYLENE GLYCOL 3350 17 G: 17 POWDER, FOR SOLUTION ORAL at 09:47

## 2024-03-04 RX ADMIN — MAGNESIUM OXIDE 400 MG (241.3 MG MAGNESIUM) TABLET 400 MG: TABLET at 09:43

## 2024-03-04 RX ADMIN — SERTRALINE 100 MG: 100 TABLET, FILM COATED ORAL at 09:48

## 2024-03-04 RX ADMIN — IPRATROPIUM BROMIDE AND ALBUTEROL SULFATE 3 ML: .5; 3 SOLUTION RESPIRATORY (INHALATION) at 20:40

## 2024-03-04 RX ADMIN — LISINOPRIL 40 MG: 40 TABLET ORAL at 09:43

## 2024-03-04 RX ADMIN — CARVEDILOL 6.25 MG: 6.25 TABLET, FILM COATED ORAL at 17:46

## 2024-03-04 RX ADMIN — IPRATROPIUM BROMIDE AND ALBUTEROL SULFATE 3 ML: .5; 3 SOLUTION RESPIRATORY (INHALATION) at 12:59

## 2024-03-04 RX ADMIN — PANTOPRAZOLE SODIUM 40 MG: 40 TABLET, DELAYED RELEASE ORAL at 09:45

## 2024-03-04 RX ADMIN — NYSTATIN 500000 UNITS: 100000 SUSPENSION ORAL at 17:46

## 2024-03-04 RX ADMIN — IPRATROPIUM BROMIDE AND ALBUTEROL SULFATE 3 ML: .5; 3 SOLUTION RESPIRATORY (INHALATION) at 07:26

## 2024-03-04 RX ADMIN — NYSTATIN 500000 UNITS: 100000 SUSPENSION ORAL at 20:18

## 2024-03-04 RX ADMIN — CEFTRIAXONE SODIUM 1000 MG: 1 INJECTION, POWDER, FOR SOLUTION INTRAMUSCULAR; INTRAVENOUS at 20:18

## 2024-03-04 RX ADMIN — FUROSEMIDE 40 MG: 40 TABLET ORAL at 20:18

## 2024-03-04 RX ADMIN — CARVEDILOL 6.25 MG: 6.25 TABLET, FILM COATED ORAL at 09:53

## 2024-03-04 RX ADMIN — BUDESONIDE 0.5 MG: 0.5 INHALANT ORAL at 20:40

## 2024-03-04 RX ADMIN — ATORVASTATIN CALCIUM 40 MG: 20 TABLET, FILM COATED ORAL at 09:49

## 2024-03-04 RX ADMIN — AMLODIPINE BESYLATE 10 MG: 10 TABLET ORAL at 09:52

## 2024-03-04 RX ADMIN — POTASSIUM CHLORIDE 20 MEQ: 750 TABLET, EXTENDED RELEASE ORAL at 09:45

## 2024-03-04 RX ADMIN — ENOXAPARIN SODIUM 60 MG: 100 INJECTION SUBCUTANEOUS at 17:46

## 2024-03-04 RX ADMIN — GUAIFENESIN 1200 MG: 600 TABLET, EXTENDED RELEASE ORAL at 09:43

## 2024-03-04 RX ADMIN — IPRATROPIUM BROMIDE AND ALBUTEROL SULFATE 3 ML: .5; 3 SOLUTION RESPIRATORY (INHALATION) at 03:24

## 2024-03-04 NOTE — CONSULTS
Referring Provider: Sergei Burgess MD      Subjective   History of present illness: 68-year-old with chronic hypercapnic/hypoxemic respiratory failure secondary to COPD on 2 L home oxygen admitted with several weeks of worsening dyspnea which had improved with steroids.  She has no change in her chronic cough and no fevers.  As part of her evaluation she underwent TTE which showed a moderate, non-mobile mass on the posterior leaflet(s) of the prosthetic mitral valve that is consistent with a vegetation.  She has a history of streptococcal prosthetic valve endocarditis in 2021 treated medically at that location.    Past Medical History:   Diagnosis Date    VAN (acute kidney injury)     Anemia     Asthma     Atrial flutter     cardioversion    Cataract     Celiac artery stenosis     Chronic respiratory failure with hypoxia     Colon polyp     COPD (chronic obstructive pulmonary disease)     GI bleed     Hiatal hernia     History of CHF (congestive heart failure)     due to MR    History of home oxygen therapy     3 lpm NC    History of mitral valve replacement with tissue graft     Hyperlipidemia     Hypertension     Infectious viral hepatitis     B    Intertrigo     Long term (current) use of anticoagulants     Mitral regurgitation     s/p tissue MVR    PAF (paroxysmal atrial fibrillation)     s/p MAZE    Pneumonia     Pulmonary hypertension     S/P TVR (tricuspid valve repair) 7/7/2016       Past Surgical History:   Procedure Laterality Date    CARDIAC CATHETERIZATION  09/01/2014    Right dominant systemt, normal coronary arteries.     CARDIAC CATHETERIZATION Left 6/10/2016    Procedure: Cardiac catheterization;  Surgeon: Sergei Hall MD;  Location:  [facility];  Service:     CARDIAC CATHETERIZATION N/A 6/10/2016    Procedure: Right Heart Cath;  Surgeon: Sergei Hall MD;  Location:  [facility];  Service:     CATARACT EXTRACTION      COLONOSCOPY      COLONOSCOPY N/A 8/4/2017     Procedure: COLONOSCOPY TO CECUM/TI WITH POLYPECTOMY ( COLD BX);  Surgeon: Cleveland Devine MD;  Location: [facility] ENDOSCOPY;  Service:     COLONOSCOPY N/A 8/10/2017    Procedure: COLONOSCOPY to cecum and TI with 2 clips placed at transverse;  Surgeon: Earnest PALOMO MD;  Location: [facility] ENDOSCOPY;  Service:     COLONOSCOPY N/A 12/22/2017    Procedure: COLONOSCOPY INTO CECUM WITH COLD POLYPECTOMIES;  Surgeon: Cleveland Devine MD;  Location: [facility] ENDOSCOPY;  Service:     COLONOSCOPY N/A 5/17/2021    Procedure: COLONOSCOPY to cecum;  Surgeon: Cleveland Devine MD;  Location: [facility] ENDOSCOPY;  Service: Gastroenterology;  Laterality: N/A;  Pre: Fe deficency anemia, h/x of polyps  Post: fair prep, normal    ENDOSCOPY N/A 8/17/2017    Procedure: ESOPHAGOGASTRODUODENOSCOPY;  Surgeon: Porsha Ruby MD;  Location: [facility] ENDOSCOPY;  Service:     ENDOSCOPY N/A 12/22/2017    Procedure: ESOPHAGOGASTRODUODENOSCOPY WITH BIOPSIES;  Surgeon: Cleveland Devine MD;  Location: [facility] ENDOSCOPY;  Service:     ENDOSCOPY N/A 5/17/2021    Procedure: ESOPHAGOGASTRODUODENOSCOPY  with biopsies;  Surgeon: Celveland Devine MD;  Location: [facility] ENDOSCOPY;  Service: Gastroenterology;  Laterality: N/A;  Pre: Fe deficency anemia, nausea, heme positive stool   Post: gastritis, sloughing of distal esophagus mucosa    GALLBLADDER SURGERY      HEMORRHOIDECTOMY      HYSTERECTOMY      KIDNEY SURGERY  04/22/2013    Stent placement    MAZE PROCEDURE      MITRAL VALVE REPLACEMENT      TONSILLECTOMY      TRICUSPID VALVE REPLACEMENT             Allergies   Allergen Reactions    Bupropion Itching    Cephalexin Itching     Tolerated piperacillin/tazobactam, ampicillin, cefdinir, and ceftriaxone    Metoprolol Itching       Medication:  Antibiotics:  Anti-Infectives (From admission, onward)      Ordered     Dose/Rate Route Frequency Start Stop    03/02/24 2052  cefTRIAXone (ROCEPHIN) 1,000 mg in sodium chloride 0.9 % 100 mL IVPB-VTB        Ordering Provider: dAitya  Sergei Perez MD    1,000 mg  200 mL/hr over 30 Minutes Intravenous Every 24 Hours 03/03/24 2000 03/07/24 1959 03/02/24 1946  cefTRIAXone (ROCEPHIN) 2,000 mg in sodium chloride 0.9 % 100 mL IVPB-VTB        Ordering Provider: Irina Cook PA    2,000 mg  200 mL/hr over 30 Minutes Intravenous Once 03/02/24 2002 03/02/24 2116 03/02/24 1946  azithromycin (ZITHROMAX) 500 mg in sodium chloride 0.9 % 250 mL IVPB-VTB        Ordering Provider: Irina Cook PA    500 mg  over 60 Minutes Intravenous Once 03/02/24 2002 03/02/24 2350                Physical Exam:   Vital Signs   Temp:  [97.7 °F (36.5 °C)-98.4 °F (36.9 °C)] 98.1 °F (36.7 °C)  Heart Rate:  [66-93] 71  Resp:  [16-20] 18  BP: (123-135)/() 126/57    GENERAL: Awake and alert, in no acute distress.   HEENT: Oropharynx is clear. Hearing is grossly normal.   EYES: . No conjunctival injection. No lid lag.   LUNGS:normal respiratory effort.   SKIN: no cutaneous eruptions in exposed areas  PSYCHIATRIC: Appropriate mood, affect, insight, and judgment.     Results Review:  WBC 14.66, hgb 10, plt 243  Cr 0.75  PCT 0.06    3/2 rpp neg  3/2 Bcx cons 1/2  3/3 strep and legionella neg  CT chest, independently interpreted: emphysema with some ggo but no lobar pna    A/p  1.  Acute exacerbation of COPD  2.  History of endocarditis  3.  Chronic hypoxemic respiratory failure  4.  Coag negative staph bacteremia, likely contaminant  5.  Leukocytosis likely secondary to steroid administration      History of streptococcal prosthetic valve endocarditis in 2021 treated medically.  She has persistent echocardiographic changes which is common after successful medical treatment and does not have clear prognostic impact.  Procalcitonin is negative and doubt bacterial pneumonia.  Her imaging is not consistent with bacterial pneumonia.  I would be okay stopping her antibiotics if okay with pulmonary.  Agree with steroids and nebulizers for COPD exacerbation.  Blood culture grew  coag negative staph which is suspected to be contaminant.      Thank you for this consult.  We will continue to follow along and tailor antibiotics as the patient's clinical course evolves.

## 2024-03-04 NOTE — CASE MANAGEMENT/SOCIAL WORK
Discharge Planning Assessment  [facility]     Patient Name: Paz Browne  MRN: [id number]  Today's Date: 3/4/2024    Admit Date: 3/2/2024    Plan: Home, with family support, family to transport home   Discharge Needs Assessment       Row Name 03/04/24 1043       Living Environment    People in Home spouse    Name(s) of People in Home Chapincito Browne [phone]    Current Living Arrangements home    Potentially Unsafe Housing Conditions none    In the past 12 months has the electric, gas, oil, or water company threatened to shut off services in your home? No    Primary Care Provided by self    Provides Primary Care For no one    Family Caregiver if Needed child(dorian), adult;spouse    Family Caregiver Names Chapincito Browne, several adult children    Quality of Family Relationships helpful;involved;supportive    Able to Return to Prior Arrangements yes       Resource/Environmental Concerns    Transportation Concerns none       Transportation Needs    In the past 12 months, has lack of transportation kept you from medical appointments or from getting medications? no    In the past 12 months, has lack of transportation kept you from meetings, work, or from getting things needed for daily living? No       Food Insecurity    Within the past 12 months, you worried that your food would run out before you got the money to buy more. Never true    Within the past 12 months, the food you bought just didn't last and you didn't have money to get more. Never true       Transition Planning    Patient/Family Anticipates Transition to home;home with family    Patient/Family Anticipated Services at Transition none    Transportation Anticipated family or friend will provide       Discharge Needs Assessment    Equipment Currently Used at Home wheelchair;other (see comments)  Motorized scooter, home oxygen from a provider in [facility], cannot remember company name    Concerns to be Addressed no discharge needs identified;denies  needs/concerns at this time    Anticipated Changes Related to Illness none    Equipment Needed After Discharge none                   Discharge Plan       Row Name 03/04/24 1045       Plan    Plan Home, with family support, family to transport home    Patient/Family in Agreement with Plan yes    Provided Post Acute Provider List? N/A    Provided Post Acute Provider Quality & Resource List? N/A    Plan Comments Met with pt at bedside. Introduced self and explained role of . Face sheet verified, PCP is Javan Martinez. Pt denies any difficulty paying for medications, and she obtains her medications from [facility]. Pt lives with her spouse, Chapincito, and stated that he or her children, who all live near by, could assist with any care needs that may arise. Pt stated she is independent in ADL's, and she is mobile with her wc/motorized scooter for long distances. Pt also wears o2 continous at 2L from a provider in [facility], but she cannot remember the name of the company.Pt has had [facility] in the past and stated that she would never go to a rehab facility. Upon discharge, pt stated her family would transport home, they will bring a transport tank. Explained that CCP would follow to assess for discharge needs.                  Continued Care and Services - Admitted Since 3/2/2024    No active coordination exists for this encounter.       Expected Discharge Date and Time       Expected Discharge Date Expected Discharge Time    Mar 6, 2024            Demographic Summary    No documentation.                  Functional Status    No documentation.                  Psychosocial    No documentation.                  Abuse/Neglect    No documentation.                  Legal    No documentation.                  Substance Abuse    No documentation.                  Patient Forms    No documentation.                     Yenny Stanley RN

## 2024-03-04 NOTE — PROGRESS NOTES
Name: Paz Browne ADMIT: 3/2/2024   : 1955  PCP: Javan Martinez MD    MRN: [id number] LOS: 0 days   AGE/SEX: 68 y.o. female  ROOM: [facility]     Subjective   Subjective   Feels okay today. Chronic back pain. Pain when she takes of breath she states it is from pneumonia. She states her strength is fine. Lives at home with multiple family members including  and sons. Tolerating a diet. She states she had a bowel movement yesterday.     Objective   Objective   Vital Signs  Temp:  [97.7 °F (36.5 °C)-98.4 °F (36.9 °C)] 98.1 °F (36.7 °C)  Heart Rate:  [66-93] 71  Resp:  [16-20] 18  BP: (123-135)/() 126/57  SpO2:  [86 %-100 %] 99 %  on  Flow (L/min):  [2] 2;   Device (Oxygen Therapy): (P) nasal cannula  Body mass index is 20.89 kg/m².    Physical Exam  Constitutional:       General: She is not in acute distress.     Appearance: She is ill-appearing (chronic). She is not toxic-appearing.   HENT:      Head: Normocephalic and atraumatic.   Cardiovascular:      Rate and Rhythm: Normal rate and regular rhythm.   Pulmonary:      Effort: Pulmonary effort is normal. No respiratory distress.      Breath sounds: Decreased breath sounds present.   Abdominal:      General: Bowel sounds are normal.      Palpations: Abdomen is soft.      Tenderness: There is no abdominal tenderness. There is no guarding or rebound.   Musculoskeletal:         General: No swelling.   Skin:     General: Skin is warm and dry.   Neurological:      General: No focal deficit present.      Mental Status: She is alert and oriented to person, place, and time.   Psychiatric:         Mood and Affect: Mood normal.         Behavior: Behavior normal.     Results Review  I reviewed the patient's new clinical results.  Results from last 7 days   Lab Units 24  0501 24  0438 24  1820   WBC 10*3/mm3 14.66* 7.80 14.18*   HEMOGLOBIN g/dL 10.1* 10.5* 10.9*   PLATELETS 10*3/mm3 243 241 231     Results from last 7 days   Lab Units  03/04/24  0501 03/03/24  0438 03/02/24  1820   SODIUM mmol/L 140 141 140   POTASSIUM mmol/L 3.8 4.4 3.7   CHLORIDE mmol/L 100 99 99   CO2 mmol/L 28.6 26.8 29.4*   BUN mg/dL 11 9 11   CREATININE mg/dL 0.75 0.75 0.81   GLUCOSE mg/dL 145* 170* 115*     Lab Results   Component Value Date    ANIONGAP 11.4 03/04/2024     Estimated Creatinine Clearance: 66.6 mL/min (by C-G formula based on SCr of 0.75 mg/dL).   Lab Results   Component Value Date    EGFR 85.8 03/04/2024     Results from last 7 days   Lab Units 03/02/24  1820   ALBUMIN g/dL 3.9   BILIRUBIN mg/dL 0.7   ALK PHOS U/L 94   AST (SGOT) U/L 14   ALT (SGPT) U/L 11     Results from last 7 days   Lab Units 03/04/24  0501 03/03/24  0438 03/02/24  1820   CALCIUM mg/dL 9.1 9.3 8.6   ALBUMIN g/dL  --   --  3.9     Results from last 7 days   Lab Units 03/02/24  1820   PROCALCITONIN ng/mL 0.06   LACTATE mmol/L 1.1     Hemoglobin A1C   Date/Time Value Ref Range Status   03/03/2024 1550 5.00 4.80 - 5.60 % Final       CT Chest Without Contrast Diagnostic    Result Date: 3/2/2024  The patient has some vague groundglass attenuation within the left lower lobe. An acute infectious or inflammatory process is not excluded.  Radiation dose reduction techniques were utilized, including automated exposure control and exposure modulation based on body size.   This report was finalized on 3/2/2024 7:40 PM by Dr. Leigh Parr M.D on Workstation: [id number]       Scheduled Meds  amLODIPine, 10 mg, Oral, Daily  aspirin, 81 mg, Oral, Daily  atorvastatin, 40 mg, Oral, Daily  budesonide, 0.5 mg, Nebulization, BID - RT  carvedilol, 6.25 mg, Oral, BID With Meals  cefTRIAXone, 1,000 mg, Intravenous, Q24H  cetirizine, 10 mg, Oral, Daily  enoxaparin, 40 mg, Subcutaneous, Daily  furosemide, 40 mg, Oral, BID  guaiFENesin, 1,200 mg, Oral, Q12H  ipratropium-albuterol, 3 mL, Nebulization, Q4H - RT  lisinopril, 40 mg, Oral, Daily  Magnesium Oxide -Mg Supplement, 1 tablet, Oral,  Daily  methylPREDNISolone sodium succinate, 60 mg, Intravenous, Q8H  multivitamin with minerals, 1 tablet, Oral, Daily  nystatin, 500,000 Units, Swish & Spit, 4x Daily  pantoprazole, 40 mg, Oral, Q AM  polyethylene glycol, 17 g, Oral, Daily  potassium chloride, 20 mEq, Oral, Daily  roflumilast, 500 mcg, Oral, Daily  rOPINIRole, 2 mg, Oral, Nightly  sertraline, 100 mg, Oral, Daily    Continuous Infusions   PRN Meds    acetaminophen    albuterol    senna-docusate sodium **AND** polyethylene glycol **AND** bisacodyl **AND** bisacodyl    cyclobenzaprine    HYDROcodone-acetaminophen    ipratropium-albuterol    melatonin    ondansetron ODT **OR** ondansetron    [COMPLETED] Insert Peripheral IV **AND** sodium chloride    sodium chloride    zolpidem     Diet  Diet: Cardiac; Healthy Heart (2-3 Na+); Fluid Consistency: Thin (IDDSI 0)    I have personally reviewed:  [x]  Medications  [x]  Laboratory   []  Microbiology   [x]  Radiology   [x]  EKG/Telemetry atrial fibrillation  []  Cardiology/Vascular   []  Pathology   []  Records     Results for orders placed during the hospital encounter of 03/02/24    Adult Transthoracic Echo Complete w/ Color, Spectral and Contrast if Necessary Per Protocol    Interpretation Summary    Left ventricular systolic function is hyperdynamic (EF > 70%). Calculated left ventricular EF = 73.7%    Left ventricular wall thickness is consistent with concentric hypertrophy.    Left ventricular diastolic function was indeterminate.    There is a bioprosthetic mitral valve present. There is a moderate, non-mobile mass on the posterior leaflet(s) of the prosthetic mitral valve that is consistent with a vegetation.    Mild aortic valve stenosis is present. Aortic valve area is 1.8 cm2.    Aortic valve maximum pressure gradient is 15 mmHg. Aortic valve mean pressure gradient is 9 mmHg.    Moderate mitral valve stenosis is present. The mitral valve peak gradient is 21 mmHg. The mitral valve mean gradient is 9  mmHg.    Mild mitral valve regurgitation is present.    Mild tricuspid valve regurgitation is present.    Estimated right ventricular systolic pressure from tricuspid regurgitation is mildly elevated (35-45 mmHg). Calculated right ventricular systolic pressure from tricuspid regurgitation is 41 mmHg.        Assessment/Plan     Active Hospital Problems    Diagnosis  POA    **Community acquired pneumonia [J18.9]  Yes    Leukocytosis [D72.829]  Yes    Elevated troponin [R79.89]  Yes    COPD exacerbation [J44.1]  Yes    Chronic diastolic CHF (congestive heart failure) [I50.32]  Yes    History of endocarditis [Z86.79]  Not Applicable    Chronic hypoxemic respiratory failure [J96.11]  Yes    Chronic low back pain [M54.50, G89.29]  Yes    S/P TVR (tricuspid valve repair) [Z98.890]  Not Applicable    Pulmonary hypertension [I27.20]  Yes    Hypertension [I10]  Yes    Hyperlipidemia [E78.5]  Yes    PAF (paroxysmal atrial fibrillation) [I48.0]  Yes      Resolved Hospital Problems   No resolved problems to display.     68 y.o. female     Community-acquired pneumonia  COPD exacerbation, acute  Chronic hypoxic respiratory failure  Respiratory panel, procalcitonin, lactate, Legionella and strep antigens negative  Continue antibiotics  Flow (L/min):  [2] 2 (baseline oxygen requirement is 2 L/min)  Pulmonology following    Chronic diastolic heart failure  Atrial flutter status post cardioversion  Hypertension  History of endocarditis, mitral valve vegetation on echocardiogram  Not on anticoagulation due to GI bleed  Cardiology consulted and agree with ID consult also    Celiac artery stenosis  Hyperlipidemia  Aspirin, statin    Hyperglycemia  A1c 5.00%    Anemia, probably of chronic disease  Continue to monitor    DVT prophylaxis  Lovenox 40 mg SC daily    Discharge  Eventually home. Therapies.  Expected Discharge Date: 3/6/2024; Expected Discharge Time:     Discussed with patient, family, and nursing staff    Ronald Marie  MD [facility]  03/04/24

## 2024-03-04 NOTE — PLAN OF CARE
Goal Outcome Evaluation:   Pt A&O times 4  VSS  Family with pt at bedside   Pt ate excellent today  Zofran requested by pt due to nausea   Safety maintained throughout entire shift

## 2024-03-04 NOTE — PLAN OF CARE
Goal Outcome Evaluation:  Plan of Care Reviewed With: patient, family           Outcome Evaluation: VSS. O2 @ 2L. ECHO performed today. Consult for cardio and pulmonary placed. IV atb continue. Receiving PO lasix and lovenox. Family stays at bedside continually. Safety measures in place.

## 2024-03-04 NOTE — SIGNIFICANT NOTE
03/04/24 0645   OTHER   Discipline physical therapist   Therapy Assessment/Plan (PT)   Criteria for Skilled Interventions Met (PT) no problems identified which require skilled intervention  (Nsg doc pt is up adlib/indep along with an ampac of 24. No indication for acute PT needs.)

## 2024-03-04 NOTE — SIGNIFICANT NOTE
03/04/24 3008   OTHER   Discipline occupational therapist   Rehab Time/Intention   Session Not Performed other (see comments)  (Pt sleeping. The pt's  stated no OT needs are indicated at this time. OT to s/o.)

## 2024-03-04 NOTE — PROGRESS NOTES
"[facility] Clinical Pharmacy Services: Enoxaparin Consult    Paz Browne has a pharmacy consult to dose full-dose enoxaparin per Dr Haddad's request.     Indication: A Fib - requiring full anticoagulation  Home Anticoagulation: none     Relevant clinical data and objective history reviewed:  68 y.o. female 170 cm (66.93\") 60.4 kg (133 lb 1.6 oz)   Body mass index is 20.89 kg/m².   Results from last 7 days   Lab Units 03/04/24  0501   PLATELETS 10*3/mm3 243     Estimated Creatinine Clearance: 66.6 mL/min (by C-G formula based on SCr of 0.75 mg/dL).    Assessment/Plan    Will start patient on  60 mg (1mg/kg) subcutaneous every 12 hours, adjusted for renal function. Consult order will be discontinued but pharmacy will continue to follow.     Nicole Padron, [facility]  Clinical Pharmacist    "

## 2024-03-04 NOTE — PLAN OF CARE
Goal Outcome Evaluation:              Outcome Evaluation: VSS. O2 SAT STABLE ON HOME DOSE OF 2 L/M N/C. DOES GET SHORT OF BREATH WITH SOME AUDIBLE WHEEZING WHEN GETTING BACK FROM  BEDSIDE COMMODE.

## 2024-03-04 NOTE — PROGRESS NOTES
[facility] INPATIENT PROGRESS NOTE         [address]    3/4/2024      PATIENT IDENTIFICATION:  Name: Paz Browne ADMIT: 3/2/2024   : 1955  PCP: Javan Martinez MD    MRN: [id number] LOS: 0 days   AGE/SEX: 68 y.o. female  ROOM: [facility]                     LOS 0    Reason for visit: AECOPD and PNA      SUBJECTIVE:      Still with some wheeze.  Less soa.    Objective   OBJECTIVE:    Vital Sign Min/Max for last 24 hours  Temp  Min: 97.7 °F (36.5 °C)  Max: 98.4 °F (36.9 °C)   BP  Min: 123/65  Max: 155/62   Pulse  Min: 66  Max: 95   Resp  Min: 16  Max: 20   SpO2  Min: 86 %  Max: 100 %   No data recorded   Weight  Min: 59.7 kg (131 lb 9.8 oz)  Max: 60.4 kg (133 lb 1.6 oz)    Vitals:    24 0603 24 0726 24 0728 24 0738   BP:       BP Location:       Patient Position:       Pulse:  80 93 66   Resp:  18     Temp:       TempSrc:       SpO2:  94% (!) 86% (!) 89%   Weight: 60.4 kg (133 lb 1.6 oz)      Height:                24  0453 24  0954 24  0603   Weight: 59.7 kg (131 lb 11.2 oz) 59.7 kg (131 lb 9.8 oz) 60.4 kg (133 lb 1.6 oz)       Body mass index is 20.89 kg/m².                          Body mass index is 20.89 kg/m².    Intake/Output Summary (Last 24 hours) at 3/4/2024 0750  Last data filed at 3/3/2024 2303  Gross per 24 hour   Intake 600 ml   Output 700 ml   Net -100 ml         Exam:  GEN:  No distress, appears stated age  EYES:   PERRL, anicteric sclerae  ENT:    External ears/nose normal, OP clear  NECK:  No adenopathy, midline trachea  LUNGS: Normal chest on inspection, palpation and mild exp wheeze auscultation  CV:  Normal S1S2, without murmur  ABD:  Nontender, nondistended, no hepatosplenomegaly, +BS  EXT:  No edema.  No cyanosis or clubbing.  No mottling and normal cap refill.    Assessment     Scheduled meds:  amLODIPine, 10 mg, Oral, Daily  aspirin, 81 mg, Oral, Daily  atorvastatin, 40 mg, Oral, Daily  budesonide, 0.5 mg, Nebulization,  BID - RT  carvedilol, 6.25 mg, Oral, BID With Meals  cefTRIAXone, 1,000 mg, Intravenous, Q24H  cetirizine, 10 mg, Oral, Daily  enoxaparin, 40 mg, Subcutaneous, Daily  furosemide, 40 mg, Oral, BID  guaiFENesin, 1,200 mg, Oral, Q12H  ipratropium-albuterol, 3 mL, Nebulization, Q4H - RT  lisinopril, 40 mg, Oral, Daily  Magnesium Oxide -Mg Supplement, 1 tablet, Oral, Daily  methylPREDNISolone sodium succinate, 60 mg, Intravenous, Q8H  multivitamin with minerals, 1 tablet, Oral, Daily  nystatin, 500,000 Units, Swish & Spit, 4x Daily  pantoprazole, 40 mg, Oral, Q AM  polyethylene glycol, 17 g, Oral, Daily  potassium chloride, 20 mEq, Oral, Daily  roflumilast, 500 mcg, Oral, Daily  rOPINIRole, 2 mg, Oral, Nightly  sertraline, 100 mg, Oral, Daily      IV meds:                         Data Review:  Results from last 7 days   Lab Units 03/04/24  0501 03/03/24  0438 03/02/24  1820   SODIUM mmol/L 140 141 140   POTASSIUM mmol/L 3.8 4.4 3.7   CHLORIDE mmol/L 100 99 99   CO2 mmol/L 28.6 26.8 29.4*   BUN mg/dL 11 9 11   CREATININE mg/dL 0.75 0.75 0.81   GLUCOSE mg/dL 145* 170* 115*   CALCIUM mg/dL 9.1 9.3 8.6         Estimated Creatinine Clearance: 66.6 mL/min (by C-G formula based on SCr of 0.75 mg/dL).  Results from last 7 days   Lab Units 03/04/24  0501 03/03/24  0438 03/02/24  1820   WBC 10*3/mm3 14.66* 7.80 14.18*   HEMOGLOBIN g/dL 10.1* 10.5* 10.9*   PLATELETS 10*3/mm3 243 241 231         Results from last 7 days   Lab Units 03/02/24  1820   ALT (SGPT) U/L 11   AST (SGOT) U/L 14         Results from last 7 days   Lab Units 03/02/24  1820   PROCALCITONIN ng/mL 0.06   LACTATE mmol/L 1.1         Hemoglobin A1C   Date/Time Value Ref Range Status   03/03/2024 1550 5.00 4.80 - 5.60 % Final         Imaging reviewed  CXR and CT chest reviewed      Microbiology reviewed            Active Hospital Problems    Diagnosis  POA    **Community acquired pneumonia [J18.9]  Yes    Leukocytosis [D72.829]  Yes    Elevated troponin [R79.89]  Yes     COPD exacerbation [J44.1]  Yes    Chronic diastolic CHF (congestive heart failure) [I50.32]  Yes    History of endocarditis [Z86.79]  Not Applicable    Chronic hypoxemic respiratory failure [J96.11]  Yes    Chronic low back pain [M54.50, G89.29]  Yes    S/P TVR (tricuspid valve repair) [Z98.890]  Not Applicable    Pulmonary hypertension [I27.20]  Yes    Hypertension [I10]  Yes    Hyperlipidemia [E78.5]  Yes    PAF (paroxysmal atrial fibrillation) [I48.0]  Yes      Resolved Hospital Problems   No resolved problems to display.         ASSESSMENT:  Community-acquired pneumonia  Acute exacerbation of COPD  Chronic diastolic CHF  Atrial flutter  Valvular heart disease with aortic stenosis and bioprosthetic mitral valve with signs of vegetation on echo  Pulmonary hypertension  Celiac artery stenosis      PLAN:  Increased frequency of nebulized bronchodilators.    Changed steroids to IV.    Wean oxygen as able.    Agree with antibiotics for PNA.  Cardiology being consulted for heart disease and vegetation. Still to see.  May need ID consult.     Luis A Murray MD  Pulmonary and Critical Care Medicine  [facility], [facility]  3/4/2024    07:50 EST

## 2024-03-04 NOTE — CONSULTS
Date of Consultation: 24    Referral Provider: Sergei Burgess, *     Reason for Consultation: CHF, vegetation on mitral valve    Encounter Provider: Kamar Haddad MD    Group of Service: [facility]     Patient Name: Paz Browne    :1955    Chief complaint:  Shortness of Breath    History of Present Illness:      Paz Browne is a 68 year-old female who follows with Dr. Null in the office. She has a past medical history significant for paroxysmal atrial fibrillation, tricuspid valve repair, mitral valve replacement, COPD, hypertension, pulmonary hypertension, and GI bleed.     In 2014, she was hospitalized with progressive shortness of breath and was found to be in rapid atrial fibrillation with congestive heart failure. Echocardiogram revealed normal LV systolic function with severely dilated left atrium, moderately enlarged right ventricle, severe mitral and tricuspid insufficiency, and severe pulmonary hypertension. She was diuresed and rate controlled. A CHIO confirmed severe mitral and tricuspid regurgitation. She subsequently underwent a cardiac catheterization that showed severe mitral insufficiency but no significant coronary artery disease. She underwent urgent mitral valve repair using a #26 annuloplasty ring which was unsuccessful and had to be replaced with a #31 mm porcine [employer]o prosthesis. She also had tricuspid valve repair and a right and left CryoMaze procedure with closure of the atrial appendage. She was later found to be bradycardic and then went back into atrial fibrillation and was started on warfarin.     In 2017, she had some GI bleeding. She had a polypectomy along with some gastritis and hemorrhoids. Her warfarin was stopped and she was in sinus rhythm. She was admitted in 2018 with some right-sided pneumonia and viral infection. She was again admitted in 2019 with Haemophilus influenza pneumonia and  exacerbation of her COPD.     In 2021, she was diagnosed with recurrent strep bacteremia. At that time, a CHIO was done that showed a small vegetation on the bioprosthetic mitral valve, but no valvular destruction. She was ultimately treated with IV antibiotics for 6 weeks and oral antibiotics for 3 months.     She presented to the emergency department on 3/2/2024 with shortness of breath and fever.  Her chest x-ray showed evidence of a lingular pneumonia.  She has been in atrial fibrillation since admission.  An echocardiogram on 3/3/2024 showed a calcified and stable echodensity on the bioprosthetic mitral valve.  It did not appear to be mobile.  Her blood cultures have been negative thus far.  She does not appear to be in congestive heart failure.      ECHO 3/3/2024    Left ventricular systolic function is hyperdynamic (EF > 70%). Calculated left ventricular EF = 73.7%    Left ventricular wall thickness is consistent with concentric hypertrophy.    Left ventricular diastolic function was indeterminate.    There is a bioprosthetic mitral valve present. There is a moderate, non-mobile mass on the posterior leaflet(s) of the prosthetic mitral valve that is consistent with a vegetation.    Mild aortic valve stenosis is present. Aortic valve area is 1.8 cm2.    Aortic valve maximum pressure gradient is 15 mmHg. Aortic valve mean pressure gradient is 9 mmHg.    Moderate mitral valve stenosis is present. The mitral valve peak gradient is 21 mmHg. The mitral valve mean gradient is 9 mmHg.    Mild mitral valve regurgitation is present.    Mild tricuspid valve regurgitation is present.    Estimated right ventricular systolic pressure from tricuspid regurgitation is mildly elevated (35-45 mmHg). Calculated right ventricular systolic pressure from tricuspid regurgitation is 41 mmHg.     Cath 6/10/2016  SUMMARY: Moderate pulmonary hypertension that did not improve with vasodilator challenge.  Adenosine increased cardiac output  and mitral regurgitation.     RECOMMENDATIONS: Pulmonary consult for pulmonary hypertension.    Past Medical History:   Diagnosis Date    VAN (acute kidney injury)     Anemia     Asthma     Atrial flutter     cardioversion    Cataract     Celiac artery stenosis     Chronic respiratory failure with hypoxia     Colon polyp     COPD (chronic obstructive pulmonary disease)     GI bleed     Hiatal hernia     History of CHF (congestive heart failure)     due to MR    History of home oxygen therapy     3 lpm NC    History of mitral valve replacement with tissue graft     Hyperlipidemia     Hypertension     Infectious viral hepatitis     B    Intertrigo     Long term (current) use of anticoagulants     Mitral regurgitation     s/p tissue MVR    PAF (paroxysmal atrial fibrillation)     s/p MAZE    Pneumonia     Pulmonary hypertension     S/P TVR (tricuspid valve repair) 7/7/2016         Past Surgical History:   Procedure Laterality Date    CARDIAC CATHETERIZATION  09/01/2014    Right dominant systemt, normal coronary arteries.     CARDIAC CATHETERIZATION Left 6/10/2016    Procedure: Cardiac catheterization;  Surgeon: Sergei Hall MD;  Location: [facility] CATH INVASIVE LOCATION;  Service:     CARDIAC CATHETERIZATION N/A 6/10/2016    Procedure: Right Heart Cath;  Surgeon: Sergei Hall MD;  [facility] CATH [facility]AS[facility];  Service:     CATARACT EXTRACTION      COLONOSCOPY      COLONOSCOPY N/A 8/4/2017    Procedure: COLONOSCOPY TO CECUM/TI WITH POLYPECTOMY ( COLD BX);  Surgeon: Cleveland Devine MD;  Location: [facility] ENDOSCOPY;  Service:     COLONOSCOPY N/A 8/10/2017    Procedure: COLONOSCOPY to cecum and TI with 2 clips placed at transverse;  Surgeon: Earnest PALOMO MD;  Location: [facility] ENDOSCOPY;  Service:     COLONOSCOPY N/A 12/22/2017    Procedure: COLONOSCOPY INTO CECUM WITH COLD POLYPECTOMIES;  Surgeon: Cleveland Devine MD;  Location: [facility] ENDOSCOPY;  Service:     COLONOSCOPY N/A 5/17/2021    Procedure: COLONOSCOPY to  cecum;  Surgeon: Cleveland Devine MD;  Location:  [facility] ENDOSCOPY;  Service: Gastroenterology;  Laterality: N/A;  Pre: Fe deficency anemia, h/x of polyps  Post: fair prep, normal    ENDOSCOPY N/A 8/17/2017    Procedure: ESOPHAGOGASTRODUODENOSCOPY;  Surgeon: Porsha Ruby MD;  [facility];  Service:     ENDOSCOPY N/A 12/22/2017    Procedure: ESOPHAGOGASTRODUODENOSCOPY WITH BIOPSIES;  Surgeon: Cleveland Devine MD;  [facility] ENDOSCOPY;  Service:     ENDOSCOPY N/A 5/17/2021    Procedure: ESOPHAGOGASTRODUODENOSCOPY  with biopsies;  Surgeon: Cleveland Devine MD;  [facility] ENDOS[facility];  Service: Gastroenterology;  Laterality: N/A;  Pre: Fe deficency anemia, nausea, heme positive stool   Post: gastritis, sloughing of distal esophagus mucosa    GALLBLADDER SURGERY      HEMORRHOIDECTOMY      HYSTERECTOMY      KIDNEY SURGERY  04/22/2013    Stent placement    MAZE PROCEDURE      MITRAL VALVE REPLACEMENT      TONSILLECTOMY      TRICUSPID VALVE REPLACEMENT           Allergies   Allergen Reactions    Bupropion Itching    Cephalexin Itching     Tolerated piperacillin/tazobactam, ampicillin, cefdinir, and ceftriaxone    Metoprolol Itching         No current facility-administered medications on file prior to encounter.     Current Outpatient Medications on File Prior to Encounter   Medication Sig Dispense Refill    albuterol sulfate  (90 Base) MCG/ACT inhaler Inhale 2 puffs Every 4 (Four) Hours As Needed for Wheezing. 18 g 3    amLODIPine (NORVASC) 10 MG tablet TAKE 1 TABLET BY MOUTH EVERY DAY 90 tablet 1    aspirin 81 MG EC tablet Take 1 tablet by mouth Daily.      atorvastatin (LIPITOR) 40 MG tablet TAKE 1 TABLET BY MOUTH EVERY DAY 90 tablet 2    carvedilol (COREG) 6.25 MG tablet TAKE 1 TABLET BY MOUTH TWICE A DAY WITH MEALS 180 tablet 2    cyclobenzaprine (FLEXERIL) 10 MG tablet TAKE 1 TABLET BY MOUTH EVERYDAY AT BEDTIME 90 tablet 3    furosemide (LASIX) 40 MG tablet TAKE 1 TABLET BY MOUTH TWICE A DAY  180 tablet 1    HYDROcodone-acetaminophen (NORCO) 7.5-325 MG per tablet Take 1 tablet by mouth Every 6 (Six) Hours As Needed for Moderate Pain (Chronic pain medicine for low back pain). 90 tablet 0    ipratropium-albuterol (DUO-NEB) 0.5-2.5 mg/3 ml nebulizer Take 3 mL by nebulization Every 4 (Four) Hours As Needed for Wheezing. 360 mL 3    lisinopril (PRINIVIL,ZESTRIL) 40 MG tablet Take 1 tablet by mouth Daily.      loratadine (CLARITIN) 10 MG tablet Take 1 tablet by mouth 2 (two) times a day.      Magnesium Oxide 400 (240 Mg) MG tablet TAKE 1 TABLET BY MOUTH EVERY DAY 90 tablet 1    Multiple Vitamins-Minerals (MULTIVITAL) tablet Take 1 tablet by mouth Daily.      Nebulizer device 1 Units 4 (Four) Times a Day As Needed (Wheezing). J44.1 j20.8 1 each 0    nystatin (MYCOSTATIN) 100,000 unit/mL suspension Swish and spit 5 mL 4 (Four) Times a Day. Retain in mouth as long as possible. 60 mL 0    O2 (OXYGEN) Inhale 2 L/min Continuous.      Omega-3 Fatty Acids (fish oil) 1000 MG capsule capsule Take  by mouth Every Night.      omeprazole (priLOSEC) 40 MG capsule TAKE 1 CAPSULE BY MOUTH EVERY DAY 90 capsule 1    ondansetron ODT (ZOFRAN-ODT) 4 MG disintegrating tablet Place 1 tablet on the tongue Every 8 (Eight) Hours As Needed for Nausea or Vomiting. 10 tablet 0    polyethylene glycol (MIRALAX) packet Take 17 g by mouth 2 (Two) Times a Day.      potassium chloride (K-DUR,KLOR-CON) 20 MEQ CR tablet 1 po tid 60 tablet 0    roflumilast (DALIRESP) 500 MCG tablet tablet Take 1 tablet by mouth Daily. 90 tablet 1    rOPINIRole (REQUIP) 2 MG tablet TAKE 1 TABLET BY MOUTH EVERY DAY AT NIGHT 90 tablet 2    sertraline (ZOLOFT) 100 MG tablet TAKE 1 TABLET BY MOUTH EVERY DAY 90 tablet 1    zolpidem (AMBIEN) 10 MG tablet Take 1 tablet by mouth At Night As Needed for Sleep. 30 tablet 3    benzonatate (TESSALON) 100 MG capsule TAKE 1 CAPSULE BY MOUTH 2 TIMES A DAY AS NEEDED FOR COUGH 180 capsule 1    budesonide (PULMICORT) 0.5 MG/2ML  "nebulizer solution Take 2 mL by nebulization 2 (Two) Times a Day for 30 days. Indications: Chronic Obstructive Lung Disease, Chronic hypoxic respiratory failure 360 mL 3    ondansetron (ZOFRAN) 4 MG tablet Take 1 tablet by mouth Every 12 (Twelve) Hours As Needed for Nausea or Vomiting. 90 tablet 3         Social History     Socioeconomic History    Marital status:     Number of children: 2   Tobacco Use    Smoking status: Former     Current packs/day: 0.00     Average packs/day: 3.0 packs/day for 54.0 years (162.0 ttl pk-yrs)     Types: Cigarettes     Start date:      Quit date:      Years since quittin.1     Passive exposure: Past    Smokeless tobacco: Never    Tobacco comments:     caffeine - tea or mt dew    Vaping Use    Vaping status: Never Used   Substance and Sexual Activity    Alcohol use: No    Drug use: No    Sexual activity: Defer         Family History   Adopted: Yes   Problem Relation Age of Onset    No Known Problems Mother     No Known Problems Father        REVIEW OF SYSTEMS:   Pertinent positives are noted in the HPI above.  Otherwise, all other systems were reviewed, and are negative.     Objective:     Vitals:    24 0738 24 0757 24 1259 24 1336   BP:  126/57     BP Location:  Right arm     Patient Position:  Lying     Pulse: 66 71  (!) 40   Resp:  18 16    Temp:  98.1 °F (36.7 °C)     TempSrc:  Oral     SpO2: (!) 89% 99%     Weight:       Height:         Body mass index is 20.89 kg/m².  Flowsheet Rows      Flowsheet Row First Filed Value   Admission Height 170.2 cm (67\") Documented at 2024   Admission Weight 60.9 kg (134 lb 3.2 oz) Documented at 2024             General:    No acute distress, alert and oriented x4, pleasant                   Head:    Normocephalic, atraumatic.   Eyes:          Conjunctivae and sclerae normal, no icterus.   Throat:   No oral lesions, no thrush, oral mucosa moist.    Neck:   Supple, trachea midline. "   Lungs:     Decreased breath sounds and rhonchi bilaterally.    Heart:    Irregularly irregular rhythm and normal rate.  II/VI SM throughout.   Abdomen:     Soft, non-tender, non-distended, positive bowel sounds.    Extremities:   No clubbing, cyanosis, or edema.     Pulses:   Pulses palpable and equal bilaterally.    Skin:   No bleeding or rash.   Neuro:   Non-focal.  Moves all extremities well.    Psychiatric:   Normal mood and affect.         Lab Review:                Results from last 7 days   Lab Units 03/04/24  0501   SODIUM mmol/L 140   POTASSIUM mmol/L 3.8   CHLORIDE mmol/L 100   CO2 mmol/L 28.6   BUN mg/dL 11   CREATININE mg/dL 0.75   GLUCOSE mg/dL 145*   CALCIUM mg/dL 9.1     Results from last 7 days   Lab Units 03/03/24  0006 03/02/24  2205 03/02/24  1820   HSTROP T ng/L 20* 23* 27*     Results from last 7 days   Lab Units 03/04/24  0501   WBC 10*3/mm3 14.66*   HEMOGLOBIN g/dL 10.1*   HEMATOCRIT % 31.1*   PLATELETS 10*3/mm3 243                       EKG (reviewed by me personally):    3/2/2024      2/17/2024          Assessment:   1.  COPD with acute exacerbation  2.  Community-acquired pneumonia  3.  Chronic hypoxic respiratory failure, on home oxygen  4.  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  History of streptococcal bioprosthetic MVR endocarditis in 2021, treated with 6 weeks of antibiotics.  Persistent calcified vegetation remnant on MVR.  6.  Paroxysmal atrial fibrillation  7.  Coag negative staph bacteremia, possible contaminant  8.  Celiac artery stenosis  9.  Anemia of chronic disease  10.  History of GI bleed in 2017 while on warfarin  11.  Mild aortic stenosis  12.  Hypertension    Plan:       I reviewed the echocardiogram images personally.  I also reviewed her CHIO from 2021 and her other echocardiograms.  This appears to be a calcified and persistent echodensity which is likely a remnant from her prior  endocarditis.  There do not appear to be mobile components, at least on the TTE.  I am doubtful that this is acute endocarditis based on the chronicity and her history.    Dr. Clark is also seeing her from infectious disease.  He feels that the coag negative staph bacteremia may be a contaminant.  Obviously, this will need to be watched closely.  I do not feel that there is any indication to perform a CHIO at this time.    She has had atrial fibrillation in the past, although this is her first recurrence in quite some time that I can find.  She had GI bleeding in 2017 while on warfarin, and it does not appear that she has been on anticoagulation since that time.  She did have her left atrial appendage closed in 2014, although this does not negate stroke risk completely.  Her [id number] score is 4.  I am going to switch her to therapeutic Lovenox and talk to her about potentially trying anticoagulation again as an outpatient.     Thank you very much for this consult.    Jem Haddad MD

## 2024-03-05 LAB
ANION GAP SERPL CALCULATED.3IONS-SCNC: 10 MMOL/L (ref 5–15)
BACTERIA SPEC AEROBE CULT: ABNORMAL
BASOPHILS # BLD AUTO: 0.01 10*3/MM3 (ref 0–0.2)
BASOPHILS NFR BLD AUTO: 0.1 % (ref 0–1.5)
BUN SERPL-MCNC: 19 MG/DL (ref 8–23)
BUN/CREAT SERPL: 17.8 (ref 7–25)
CALCIUM SPEC-SCNC: 9.2 MG/DL (ref 8.6–10.5)
CHLORIDE SERPL-SCNC: 99 MMOL/L (ref 98–107)
CO2 SERPL-SCNC: 31 MMOL/L (ref 22–29)
CREAT SERPL-MCNC: 1.07 MG/DL (ref 0.57–1)
DEPRECATED RDW RBC AUTO: 38.3 FL (ref 37–54)
EGFRCR SERPLBLD CKD-EPI 2021: 56 ML/MIN/1.73
EOSINOPHIL # BLD AUTO: 0 10*3/MM3 (ref 0–0.4)
EOSINOPHIL NFR BLD AUTO: 0 % (ref 0.3–6.2)
ERYTHROCYTE [DISTWIDTH] IN BLOOD BY AUTOMATED COUNT: 12.6 % (ref 12.3–15.4)
GLUCOSE SERPL-MCNC: 141 MG/DL (ref 65–99)
GRAM STN SPEC: ABNORMAL
HCT VFR BLD AUTO: 31.7 % (ref 34–46.6)
HGB BLD-MCNC: 10.2 G/DL (ref 12–15.9)
IMM GRANULOCYTES # BLD AUTO: 0.08 10*3/MM3 (ref 0–0.05)
IMM GRANULOCYTES NFR BLD AUTO: 0.6 % (ref 0–0.5)
ISOLATED FROM: ABNORMAL
LYMPHOCYTES # BLD AUTO: 0.64 10*3/MM3 (ref 0.7–3.1)
LYMPHOCYTES NFR BLD AUTO: 4.5 % (ref 19.6–45.3)
MCH RBC QN AUTO: 27.3 PG (ref 26.6–33)
MCHC RBC AUTO-ENTMCNC: 32.2 G/DL (ref 31.5–35.7)
MCV RBC AUTO: 84.8 FL (ref 79–97)
MONOCYTES # BLD AUTO: 0.19 10*3/MM3 (ref 0.1–0.9)
MONOCYTES NFR BLD AUTO: 1.3 % (ref 5–12)
NEUTROPHILS NFR BLD AUTO: 13.32 10*3/MM3 (ref 1.7–7)
NEUTROPHILS NFR BLD AUTO: 93.5 % (ref 42.7–76)
NRBC BLD AUTO-RTO: 0 /100 WBC (ref 0–0.2)
PLATELET # BLD AUTO: 278 10*3/MM3 (ref 140–450)
PMV BLD AUTO: 10.6 FL (ref 6–12)
POTASSIUM SERPL-SCNC: 3.8 MMOL/L (ref 3.5–5.2)
RBC # BLD AUTO: 3.74 10*6/MM3 (ref 3.77–5.28)
SODIUM SERPL-SCNC: 140 MMOL/L (ref 136–145)
WBC NRBC COR # BLD AUTO: 14.24 10*3/MM3 (ref 3.4–10.8)

## 2024-03-05 PROCEDURE — G0378 HOSPITAL OBSERVATION PER HR: HCPCS

## 2024-03-05 PROCEDURE — 96372 THER/PROPH/DIAG INJ SC/IM: CPT

## 2024-03-05 PROCEDURE — 99214 OFFICE O/P EST MOD 30 MIN: CPT | Performed by: INTERNAL MEDICINE

## 2024-03-05 PROCEDURE — 96375 TX/PRO/DX INJ NEW DRUG ADDON: CPT

## 2024-03-05 PROCEDURE — 94799 UNLISTED PULMONARY SVC/PX: CPT

## 2024-03-05 PROCEDURE — 94664 DEMO&/EVAL PT USE INHALER: CPT

## 2024-03-05 PROCEDURE — 25010000002 METHYLPREDNISOLONE PER 40 MG: Performed by: INTERNAL MEDICINE

## 2024-03-05 PROCEDURE — 25010000002 METHYLPREDNISOLONE PER 125 MG: Performed by: INTERNAL MEDICINE

## 2024-03-05 PROCEDURE — 85025 COMPLETE CBC W/AUTO DIFF WBC: CPT | Performed by: INTERNAL MEDICINE

## 2024-03-05 PROCEDURE — 25010000002 ENOXAPARIN PER 10 MG: Performed by: INTERNAL MEDICINE

## 2024-03-05 PROCEDURE — 80048 BASIC METABOLIC PNL TOTAL CA: CPT | Performed by: INTERNAL MEDICINE

## 2024-03-05 PROCEDURE — 94761 N-INVAS EAR/PLS OXIMETRY MLT: CPT

## 2024-03-05 PROCEDURE — 96376 TX/PRO/DX INJ SAME DRUG ADON: CPT

## 2024-03-05 RX ORDER — DOCOSANOL 100 MG/G
1 CREAM TOPICAL
Status: DISCONTINUED | OUTPATIENT
Start: 2024-03-05 | End: 2024-03-06 | Stop reason: HOSPADM

## 2024-03-05 RX ORDER — METHYLPREDNISOLONE SODIUM SUCCINATE 40 MG/ML
40 INJECTION, POWDER, LYOPHILIZED, FOR SOLUTION INTRAMUSCULAR; INTRAVENOUS EVERY 12 HOURS
Status: DISCONTINUED | OUTPATIENT
Start: 2024-03-05 | End: 2024-03-06

## 2024-03-05 RX ADMIN — ZOLPIDEM TARTRATE 10 MG: 5 TABLET ORAL at 21:20

## 2024-03-05 RX ADMIN — ATORVASTATIN CALCIUM 40 MG: 20 TABLET, FILM COATED ORAL at 09:32

## 2024-03-05 RX ADMIN — ENOXAPARIN SODIUM 60 MG: 100 INJECTION SUBCUTANEOUS at 06:44

## 2024-03-05 RX ADMIN — CARVEDILOL 6.25 MG: 6.25 TABLET, FILM COATED ORAL at 09:32

## 2024-03-05 RX ADMIN — NYSTATIN 500000 UNITS: 100000 SUSPENSION ORAL at 09:32

## 2024-03-05 RX ADMIN — PANTOPRAZOLE SODIUM 40 MG: 40 TABLET, DELAYED RELEASE ORAL at 06:45

## 2024-03-05 RX ADMIN — HYDROCODONE BITARTRATE AND ACETAMINOPHEN 1 TABLET: 7.5; 325 TABLET ORAL at 09:49

## 2024-03-05 RX ADMIN — BUDESONIDE 0.5 MG: 0.5 INHALANT ORAL at 19:42

## 2024-03-05 RX ADMIN — ROPINIROLE 2 MG: 2 TABLET, FILM COATED ORAL at 21:20

## 2024-03-05 RX ADMIN — FUROSEMIDE 40 MG: 40 TABLET ORAL at 09:32

## 2024-03-05 RX ADMIN — BUDESONIDE 0.5 MG: 0.5 INHALANT ORAL at 07:39

## 2024-03-05 RX ADMIN — MAGNESIUM OXIDE 400 MG (241.3 MG MAGNESIUM) TABLET 400 MG: TABLET at 09:32

## 2024-03-05 RX ADMIN — ASPIRIN 81 MG: 81 TABLET, COATED ORAL at 09:32

## 2024-03-05 RX ADMIN — Medication 1 TABLET: at 09:32

## 2024-03-05 RX ADMIN — GUAIFENESIN 1200 MG: 600 TABLET, EXTENDED RELEASE ORAL at 21:20

## 2024-03-05 RX ADMIN — METHYLPREDNISOLONE SODIUM SUCCINATE 60 MG: 125 INJECTION, POWDER, FOR SOLUTION INTRAMUSCULAR; INTRAVENOUS at 00:34

## 2024-03-05 RX ADMIN — POTASSIUM CHLORIDE 20 MEQ: 750 TABLET, EXTENDED RELEASE ORAL at 09:32

## 2024-03-05 RX ADMIN — DOCOSANOL 1 APPLICATION: 100 CREAM TOPICAL at 21:23

## 2024-03-05 RX ADMIN — IPRATROPIUM BROMIDE AND ALBUTEROL SULFATE 3 ML: .5; 3 SOLUTION RESPIRATORY (INHALATION) at 15:27

## 2024-03-05 RX ADMIN — METHYLPREDNISOLONE SODIUM SUCCINATE 40 MG: 40 INJECTION, POWDER, FOR SOLUTION INTRAMUSCULAR; INTRAVENOUS at 15:07

## 2024-03-05 RX ADMIN — IPRATROPIUM BROMIDE AND ALBUTEROL SULFATE 3 ML: .5; 3 SOLUTION RESPIRATORY (INHALATION) at 07:38

## 2024-03-05 RX ADMIN — IPRATROPIUM BROMIDE AND ALBUTEROL SULFATE 3 ML: .5; 3 SOLUTION RESPIRATORY (INHALATION) at 19:43

## 2024-03-05 RX ADMIN — ENOXAPARIN SODIUM 60 MG: 100 INJECTION SUBCUTANEOUS at 17:44

## 2024-03-05 RX ADMIN — GUAIFENESIN 1200 MG: 600 TABLET, EXTENDED RELEASE ORAL at 09:32

## 2024-03-05 RX ADMIN — HYDROCODONE BITARTRATE AND ACETAMINOPHEN 1 TABLET: 7.5; 325 TABLET ORAL at 21:20

## 2024-03-05 RX ADMIN — IPRATROPIUM BROMIDE AND ALBUTEROL SULFATE 3 ML: .5; 3 SOLUTION RESPIRATORY (INHALATION) at 22:42

## 2024-03-05 RX ADMIN — CETIRIZINE HYDROCHLORIDE 10 MG: 10 TABLET ORAL at 09:32

## 2024-03-05 RX ADMIN — DOCOSANOL 1 APPLICATION: 100 CREAM TOPICAL at 17:46

## 2024-03-05 RX ADMIN — NYSTATIN 500000 UNITS: 100000 SUSPENSION ORAL at 21:20

## 2024-03-05 RX ADMIN — SERTRALINE 100 MG: 100 TABLET, FILM COATED ORAL at 09:32

## 2024-03-05 RX ADMIN — IPRATROPIUM BROMIDE AND ALBUTEROL SULFATE 3 ML: .5; 3 SOLUTION RESPIRATORY (INHALATION) at 04:30

## 2024-03-05 RX ADMIN — LISINOPRIL 40 MG: 40 TABLET ORAL at 09:32

## 2024-03-05 RX ADMIN — IPRATROPIUM BROMIDE AND ALBUTEROL SULFATE 3 ML: .5; 3 SOLUTION RESPIRATORY (INHALATION) at 11:28

## 2024-03-05 RX ADMIN — AMLODIPINE BESYLATE 10 MG: 10 TABLET ORAL at 09:32

## 2024-03-05 RX ADMIN — FUROSEMIDE 40 MG: 40 TABLET ORAL at 21:20

## 2024-03-05 RX ADMIN — ROFLUMILAST 500 MCG: 500 TABLET ORAL at 09:32

## 2024-03-05 RX ADMIN — HYDROCODONE BITARTRATE AND ACETAMINOPHEN 1 TABLET: 7.5; 325 TABLET ORAL at 15:07

## 2024-03-05 RX ADMIN — NYSTATIN 500000 UNITS: 100000 SUSPENSION ORAL at 18:04

## 2024-03-05 NOTE — PLAN OF CARE
Goal Outcome Evaluation:  Plan of Care Reviewed With: patient        Progress: improving  Outcome Evaluation: vitals stable. pt on home o2. had BM, sent stool sample. has new IV. no c/o pain or SOB. does not have a productive cough.

## 2024-03-05 NOTE — PROGRESS NOTES
ID note for endocarditis  Subjective: She is afebrile with a Tmax of 99.  She thinks her breathing might just be slightly better.      Physical Exam:   Vital Signs   Temp:  [97.9 °F (36.6 °C)-99 °F (37.2 °C)] 97.9 °F (36.6 °C)  Heart Rate:  [40-85] 56  Resp:  [16-20] 18  BP: (110-143)/(40-86) 143/40    GENERAL: Awake and alert, in no acute distress.   HEENT: Oropharynx is clear. Hearing is grossly normal.   EYES: . No conjunctival injection. No lid lag.   LUNGS:normal respiratory effort.   SKIN: no cutaneous eruptions in exposed areas  PSYCHIATRIC: Appropriate mood, affect, insight, and judgment.     Results Review:  WBC 14.2  Creatinine 1.1    3/2 rpp neg  3/2 Bcx cons 1/2  3/3 strep and legionella neg      A/p  1.  Acute exacerbation of COPD  2.  History of endocarditis  3.  Chronic hypoxemic respiratory failure  4.  Coag negative staph bacteremia, likely contaminant  5.  Leukocytosis likely secondary to steroid administration      History of streptococcal prosthetic valve endocarditis in 2021 treated medically.  She has persistent echocardiographic changes which is common after successful medical treatment     Procalcitonin was negative.  Pulmonary was okay with stopping antibiotics and we will do so today.    Will sign off but be happy to reevaluate should infectious concerns evolve

## 2024-03-05 NOTE — PROGRESS NOTES
[facility] INPATIENT PROGRESS NOTE         [address]    3/5/2024      PATIENT IDENTIFICATION:  Name: Paz Browne ADMIT: 3/2/2024   : 1955  PCP: Javan Martinez MD    MRN: [id number] LOS: 0 days   AGE/SEX: 68 y.o. female  ROOM: [facility]                     LOS 0    Reason for visit: AECOPD and PNA      SUBJECTIVE:   No new issues overnight.    Objective   OBJECTIVE:    Vital Sign Min/Max for last 24 hours  Temp  Min: 98.1 °F (36.7 °C)  Max: 99 °F (37.2 °C)   BP  Min: 110/86  Max: 126/57   Pulse  Min: 40  Max: 93   Resp  Min: 16  Max: 20   SpO2  Min: 86 %  Max: 100 %   No data recorded   No data recorded    Vitals:    24 2329 24 2331 24 0300 24 0430   BP: 110/86      BP Location: Right arm      Patient Position: Lying      Pulse: 81 76 85 52   Resp: 18 20  20   Temp: 98.2 °F (36.8 °C)      TempSrc: Oral      SpO2: 98% 95% 97% 100%   Weight:       Height:                24  0453 24  0954 24  0603   Weight: 59.7 kg (131 lb 11.2 oz) 59.7 kg (131 lb 9.8 oz) 60.4 kg (133 lb 1.6 oz)       Body mass index is 20.89 kg/m².                          Body mass index is 20.89 kg/m².    Intake/Output Summary (Last 24 hours) at 3/5/2024 0726  Last data filed at 3/5/2024 0300  Gross per 24 hour   Intake 920 ml   Output 1550 ml   Net -630 ml         Exam:  GEN:  No distress, appears stated age  NECK:  No adenopathy, midline trachea  LUNGS: Nonlabored      Assessment     Scheduled meds:  amLODIPine, 10 mg, Oral, Daily  aspirin, 81 mg, Oral, Daily  atorvastatin, 40 mg, Oral, Daily  budesonide, 0.5 mg, Nebulization, BID - RT  carvedilol, 6.25 mg, Oral, BID With Meals  cefTRIAXone, 1,000 mg, Intravenous, Q24H  cetirizine, 10 mg, Oral, Daily  enoxaparin, 1 mg/kg, Subcutaneous, Q12H  furosemide, 40 mg, Oral, BID  guaiFENesin, 1,200 mg, Oral, Q12H  ipratropium-albuterol, 3 mL, Nebulization, Q4H - RT  lisinopril, 40 mg, Oral, Daily  Magnesium Oxide -Mg Supplement, 1  tablet, Oral, Daily  methylPREDNISolone sodium succinate, 60 mg, Intravenous, Q8H  multivitamin with minerals, 1 tablet, Oral, Daily  nystatin, 500,000 Units, Swish & Spit, 4x Daily  pantoprazole, 40 mg, Oral, Q AM  polyethylene glycol, 17 g, Oral, Daily  potassium chloride, 20 mEq, Oral, Daily  roflumilast, 500 mcg, Oral, Daily  rOPINIRole, 2 mg, Oral, Nightly  sertraline, 100 mg, Oral, Daily      IV meds:                      Pharmacy to Dose enoxaparin (LOVENOX),       Data Review:  Results from last 7 days   Lab Units 03/05/24 0423 03/04/24  0501 03/03/24  0438 03/02/24  1820   SODIUM mmol/L 140 140 141 140   POTASSIUM mmol/L 3.8 3.8 4.4 3.7   CHLORIDE mmol/L 99 100 99 99   CO2 mmol/L 31.0* 28.6 26.8 29.4*   BUN mg/dL 19 11 9 11   CREATININE mg/dL 1.07* 0.75 0.75 0.81   GLUCOSE mg/dL 141* 145* 170* 115*   CALCIUM mg/dL 9.2 9.1 9.3 8.6         Estimated Creatinine Clearance: 46.6 mL/min (A) (by C-G formula based on SCr of 1.07 mg/dL (H)).  Results from last 7 days   Lab Units 03/05/24  0423 03/04/24  0501 03/03/24  0438 03/02/24  1820   WBC 10*3/mm3 14.24* 14.66* 7.80 14.18*   HEMOGLOBIN g/dL 10.2* 10.1* 10.5* 10.9*   PLATELETS 10*3/mm3 278 243 241 231         Results from last 7 days   Lab Units 03/02/24  1820   ALT (SGPT) U/L 11   AST (SGOT) U/L 14         Results from last 7 days   Lab Units 03/02/24  1820   PROCALCITONIN ng/mL 0.06   LACTATE mmol/L 1.1         Hemoglobin A1C   Date/Time Value Ref Range Status   03/03/2024 1550 5.00 4.80 - 5.60 % Final         Imaging reviewed  CXR and CT chest reviewed      2D echo 3/3 reviewed      Microbiology reviewed  Ester negron in blood cx          Active Hospital Problems    Diagnosis  POA    **Community acquired pneumonia [J18.9]  Yes    Leukocytosis [D72.829]  Yes    Elevated troponin [R79.89]  Yes    COPD exacerbation [J44.1]  Yes    Chronic diastolic CHF (congestive heart failure) [I50.32]  Yes    History of endocarditis [Z86.79]  Not Applicable    Chronic  hypoxemic respiratory failure [J96.11]  Yes    Chronic low back pain [M54.50, G89.29]  Yes    S/P TVR (tricuspid valve repair) [Z98.890]  Not Applicable    Pulmonary hypertension [I27.20]  Yes    Hypertension [I10]  Yes    Hyperlipidemia [E78.5]  Yes    PAF (paroxysmal atrial fibrillation) [I48.0]  Yes      Resolved Hospital Problems   No resolved problems to display.         ASSESSMENT:  Acute exacerbation of COPD  Chronic diastolic CHF  Atrial flutter  Valvular heart disease with aortic stenosis and bioprosthetic mitral valve with signs of vegetation on echo  Pulmonary hypertension  Celiac artery stenosis      PLAN:  Continue bronchodilators.  Steroid taper. Likely can change back to po tomorrow.   Wean off oxygen as able.  100% sats on 2LPM.  No objection to discontinue antibiotic.      Luis A Murray MD  Pulmonary and Critical Care Medicine  [facility], [facility]  3/5/2024    07:26 EST

## 2024-03-05 NOTE — PROGRESS NOTES
LOS: 0 days   Patient Care Team:  Javan Martinez MD as PCP - General (Internal Medicine)  Ag Melo Jr., MD as Consulting Physician (Pulmonary Disease)  Cleveland Devine MD as Consulting Physician (Gastroenterology)  Earnest Gan MD as Consulting Physician (Cardiology)  Daniela Shin MD PhD as Consulting Physician (Hematology and Oncology)    Chief Complaint: Follow-up COPD exacerbation, tissue mitral valve replacement, prior bioprosthetic endocarditis, paroxysmal atrial fibrillation.    Interval History: She is slowly improving.  Her shortness of breath is still present, but is better.  She was only on 2 L of oxygen today.    Vital Signs:  Temp:  [97.9 °F (36.6 °C)-99 °F (37.2 °C)] 97.9 °F (36.6 °C)  Heart Rate:  [46-85] 70  Resp:  [16-20] 18  BP: (110-143)/(40-86) 124/48    Intake/Output Summary (Last 24 hours) at 3/5/2024 1703  Last data filed at 3/5/2024 1321  Gross per 24 hour   Intake 920 ml   Output 1550 ml   Net -630 ml       Physical Exam:   General Appearance:    No acute distress, alert and oriented x4   Lungs:     Decreased breath sounds and rhonchi bilaterally.     Heart:    Irregularly irregular rhythm and normal rate.  II/VI SM throughout.    Abdomen:     Soft, nontender, nondistended.    Extremities:  ]No clubbing, cyanosis, or edema.     Results Review:    Results from last 7 days   Lab Units 03/05/24  0423   SODIUM mmol/L 140   POTASSIUM mmol/L 3.8   CHLORIDE mmol/L 99   CO2 mmol/L 31.0*   BUN mg/dL 19   CREATININE mg/dL 1.07*   GLUCOSE mg/dL 141*   CALCIUM mg/dL 9.2     Results from last 7 days   Lab Units 03/03/24  0006 03/02/24  2205 03/02/24  1820   HSTROP T ng/L 20* 23* 27*     Results from last 7 days   Lab Units 03/05/24  0423   WBC 10*3/mm3 14.24*   HEMOGLOBIN g/dL 10.2*   HEMATOCRIT % 31.7*   PLATELETS 10*3/mm3 278                       I reviewed the patient's new clinical results.        Assessment:  1.  COPD with acute exacerbation  2.  Community-acquired  pneumonia  3.  Chronic hypoxic respiratory failure, on home oxygen  4.  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  History of streptococcal bioprosthetic MVR endocarditis in 2021, treated with 6 weeks of antibiotics.  Persistent calcified vegetation remnant on MVR.  6.  Paroxysmal atrial fibrillation  7.  Coag negative staph bacteremia, possible contaminant  8.  Celiac artery stenosis  9.  Anemia of chronic disease  10.  History of GI bleed in 2017 while on warfarin  11.  Mild aortic stenosis  12.  Hypertension     Plan:  -Again, this is her first recurrence of atrial fibrillation in quite some time from my review of the chart.  She had a GI bleed while on warfarin in 2017.  I am going to keep her on Lovenox for now.  If her hemoglobin is stable, I will talk to her about potentially starting Eliquis as an outpatient.  Her [id number] score is 4.    -I still feel that the calcified entity on the tissue mitral valve replacement is likely a calcified remnant from prior endocarditis.  She has coag negative staph in her blood, which infectious disease feels is a contaminant.  Antibiotics have been stopped.    -Continue Lasix 40 mg p.o. twice daily.  She appears euvolemic.    -Continue Coreg 6.25 mg twice daily.    Kamar Haddad MD  03/05/24  17:03 EST

## 2024-03-05 NOTE — PROGRESS NOTES
Name: Paz Browne ADMIT: 3/2/2024   : 1955  PCP: Javan Martinez MD    MRN: [id number] LOS: 0 days   AGE/SEX: 68 y.o. female  ROOM: [facility]     Subjective   Subjective   She is feeling better today. She states she ate very well. Multiple family members at bedside.     Objective   Objective   Vital Signs  Temp:  [97.9 °F (36.6 °C)-99 °F (37.2 °C)] 97.9 °F (36.6 °C)  Heart Rate:  [52-85] 73  Resp:  [16-20] 20  BP: (110-143)/(40-86) 124/48  SpO2:  [93 %-100 %] 93 %  on  Flow (L/min):  [2] 2;   Device (Oxygen Therapy): nasal cannula  Body mass index is 20.89 kg/m².    Physical Exam  Constitutional:       General: She is not in acute distress.     Appearance: She is ill-appearing (chronic). She is not toxic-appearing.   HENT:      Head: Normocephalic and atraumatic.   Cardiovascular:      Rate and Rhythm: Normal rate and regular rhythm.   Pulmonary:      Effort: Pulmonary effort is normal. No respiratory distress.      Breath sounds: Decreased breath sounds present.   Abdominal:      General: Bowel sounds are normal.      Palpations: Abdomen is soft.      Tenderness: There is no abdominal tenderness. There is no guarding or rebound.   Musculoskeletal:         General: No swelling.   Skin:     General: Skin is warm and dry.   Neurological:      General: No focal deficit present.      Mental Status: She is alert and oriented to person, place, and time.   Psychiatric:         Mood and Affect: Mood normal.         Behavior: Behavior normal.     Results Review  I reviewed the patient's new clinical results.  Results from last 7 days   Lab Units 24  0423 03/04/24  0501 24  0438 24  1820   WBC 10*3/mm3 14.24* 14.66* 7.80 14.18*   HEMOGLOBIN g/dL 10.2* 10.1* 10.5* 10.9*   PLATELETS 10*3/mm3 278 243 241 231     Results from last 7 days   Lab Units 24  0423 24  0501 24  0438 24  1820   SODIUM mmol/L 140 140 141 140   POTASSIUM mmol/L 3.8 3.8 4.4 3.7   CHLORIDE mmol/L 99  100 99 99   CO2 mmol/L 31.0* 28.6 26.8 29.4*   BUN mg/dL 19 11 9 11   CREATININE mg/dL 1.07* 0.75 0.75 0.81   GLUCOSE mg/dL 141* 145* 170* 115*     Lab Results   Component Value Date    ANIONGAP 10.0 03/05/2024     Estimated Creatinine Clearance: 46.6 mL/min (A) (by C-G formula based on SCr of 1.07 mg/dL (H)).   Lab Results   Component Value Date    EGFR 56.0 (L) 03/05/2024     Results from last 7 days   Lab Units 03/02/24  1820   ALBUMIN g/dL 3.9   BILIRUBIN mg/dL 0.7   ALK PHOS U/L 94   AST (SGOT) U/L 14   ALT (SGPT) U/L 11     Results from last 7 days   Lab Units 03/05/24  0423 03/04/24  0501 03/03/24  0438 03/02/24  1820   CALCIUM mg/dL 9.2 9.1 9.3 8.6   ALBUMIN g/dL  --   --   --  3.9     Results from last 7 days   Lab Units 03/02/24  1820   PROCALCITONIN ng/mL 0.06   LACTATE mmol/L 1.1     Hemoglobin A1C   Date/Time Value Ref Range Status   03/03/2024 1550 5.00 4.80 - 5.60 % Final       No radiology results for the last day    Scheduled Meds  amLODIPine, 10 mg, Oral, Daily  aspirin, 81 mg, Oral, Daily  atorvastatin, 40 mg, Oral, Daily  budesonide, 0.5 mg, Nebulization, BID - RT  carvedilol, 6.25 mg, Oral, BID With Meals  cetirizine, 10 mg, Oral, Daily  enoxaparin, 1 mg/kg, Subcutaneous, Q12H  furosemide, 40 mg, Oral, BID  guaiFENesin, 1,200 mg, Oral, Q12H  ipratropium-albuterol, 3 mL, Nebulization, Q4H - RT  lisinopril, 40 mg, Oral, Daily  Magnesium Oxide -Mg Supplement, 1 tablet, Oral, Daily  methylPREDNISolone sodium succinate, 40 mg, Intravenous, Q12H  multivitamin with minerals, 1 tablet, Oral, Daily  nystatin, 500,000 Units, Swish & Spit, 4x Daily  pantoprazole, 40 mg, Oral, Q AM  polyethylene glycol, 17 g, Oral, Daily  potassium chloride, 20 mEq, Oral, Daily  roflumilast, 500 mcg, Oral, Daily  rOPINIRole, 2 mg, Oral, Nightly  sertraline, 100 mg, Oral, Daily    Continuous Infusions   PRN Meds    acetaminophen    albuterol    senna-docusate sodium **AND** polyethylene glycol **AND** bisacodyl **AND**  bisacodyl    cyclobenzaprine    HYDROcodone-acetaminophen    ipratropium-albuterol    melatonin    ondansetron ODT **OR** ondansetron    [COMPLETED] Insert Peripheral IV **AND** sodium chloride    sodium chloride    zolpidem     Diet  Diet: Cardiac; Healthy Heart (2-3 Na+); Fluid Consistency: Thin (IDDSI 0)    I have personally reviewed:  [x]  Medications  [x]  Laboratory   []  Microbiology   []  Radiology   [x]  EKG/Telemetry atrial fibrillation  []  Cardiology/Vascular   []  Pathology   []  Records     Results for orders placed during the hospital encounter of 03/02/24    Adult Transthoracic Echo Complete w/ Color, Spectral and Contrast if Necessary Per Protocol    Interpretation Summary    Left ventricular systolic function is hyperdynamic (EF > 70%). Calculated left ventricular EF = 73.7%    Left ventricular wall thickness is consistent with concentric hypertrophy.    Left ventricular diastolic function was indeterminate.    There is a bioprosthetic mitral valve present. There is a moderate, non-mobile mass on the posterior leaflet(s) of the prosthetic mitral valve that is consistent with a vegetation.    Mild aortic valve stenosis is present. Aortic valve area is 1.8 cm2.    Aortic valve maximum pressure gradient is 15 mmHg. Aortic valve mean pressure gradient is 9 mmHg.    Moderate mitral valve stenosis is present. The mitral valve peak gradient is 21 mmHg. The mitral valve mean gradient is 9 mmHg.    Mild mitral valve regurgitation is present.    Mild tricuspid valve regurgitation is present.    Estimated right ventricular systolic pressure from tricuspid regurgitation is mildly elevated (35-45 mmHg). Calculated right ventricular systolic pressure from tricuspid regurgitation is 41 mmHg.        Assessment/Plan     Active Hospital Problems    Diagnosis  POA    Leukocytosis [D72.829]  Yes    Elevated troponin [R79.89]  Yes    COPD exacerbation [J44.1]  Yes    Chronic diastolic CHF (congestive heart failure)  [I50.32]  Yes    History of endocarditis [Z86.79]  Not Applicable    Chronic hypoxemic respiratory failure [J96.11]  Yes    Chronic low back pain [M54.50, G89.29]  Yes    S/P TVR (tricuspid valve repair) [Z98.890]  Not Applicable    Pulmonary hypertension [I27.20]  Yes    Hypertension [I10]  Yes    Hyperlipidemia [E78.5]  Yes    PAF (paroxysmal atrial fibrillation) [I48.0]  Yes      Resolved Hospital Problems   No resolved problems to display.     68 y.o. female     COPD exacerbation, acute  Chronic hypoxic respiratory failure  Respiratory panel, procalcitonin, lactate, Legionella and strep antigens negative-antibiotics stopped  Flow (L/min):  [2] 2 (baseline oxygen requirement is 2 L/min)  Pulmonology following  PO steroids tomorrow    Chronic diastolic heart failure  Atrial flutter status post cardioversion  Hypertension  History of endocarditis, mitral valve vegetation on echocardiogram  Not on anticoagulation due to GI bleed  Cardiology and ID following-persistent echocardiographic findings expected     Celiac artery stenosis  Hyperlipidemia  Aspirin, statin    Hyperglycemia  A1c 5.00%    Anemia, probably of chronic disease  Continue to monitor    DVT prophylaxis  Lovenox 40 mg SC daily    Discharge  Eventually home. Therapies.  Expected Discharge Date: 3/6/2024; Expected Discharge Time:     Discussed with patient, family, and nursing staff    Ronald Marie MD  [facility]  03/05/24

## 2024-03-06 ENCOUNTER — READMISSION MANAGEMENT (OUTPATIENT)
Dept: CALL CENTER | Facility: HOSPITAL | Age: 69
End: 2024-03-06
Payer: MEDICARE

## 2024-03-06 VITALS
WEIGHT: 133.1 LBS | RESPIRATION RATE: 20 BRPM | OXYGEN SATURATION: 99 % | SYSTOLIC BLOOD PRESSURE: 146 MMHG | BODY MASS INDEX: 20.89 KG/M2 | DIASTOLIC BLOOD PRESSURE: 85 MMHG | HEIGHT: 67 IN | TEMPERATURE: 97.7 F | HEART RATE: 60 BPM

## 2024-03-06 LAB
ANION GAP SERPL CALCULATED.3IONS-SCNC: 12.9 MMOL/L (ref 5–15)
BASOPHILS # BLD AUTO: 0.01 10*3/MM3 (ref 0–0.2)
BASOPHILS NFR BLD AUTO: 0.1 % (ref 0–1.5)
BUN SERPL-MCNC: 19 MG/DL (ref 8–23)
BUN/CREAT SERPL: 21.8 (ref 7–25)
CALCIUM SPEC-SCNC: 8.9 MG/DL (ref 8.6–10.5)
CHLORIDE SERPL-SCNC: 98 MMOL/L (ref 98–107)
CO2 SERPL-SCNC: 31.1 MMOL/L (ref 22–29)
CREAT SERPL-MCNC: 0.87 MG/DL (ref 0.57–1)
DEPRECATED RDW RBC AUTO: 38.5 FL (ref 37–54)
EGFRCR SERPLBLD CKD-EPI 2021: 71.8 ML/MIN/1.73
EOSINOPHIL # BLD AUTO: 0 10*3/MM3 (ref 0–0.4)
EOSINOPHIL NFR BLD AUTO: 0 % (ref 0.3–6.2)
ERYTHROCYTE [DISTWIDTH] IN BLOOD BY AUTOMATED COUNT: 12.6 % (ref 12.3–15.4)
GLUCOSE SERPL-MCNC: 119 MG/DL (ref 65–99)
HCT VFR BLD AUTO: 34.4 % (ref 34–46.6)
HGB BLD-MCNC: 11.1 G/DL (ref 12–15.9)
IMM GRANULOCYTES # BLD AUTO: 0.06 10*3/MM3 (ref 0–0.05)
IMM GRANULOCYTES NFR BLD AUTO: 0.4 % (ref 0–0.5)
LYMPHOCYTES # BLD AUTO: 0.79 10*3/MM3 (ref 0.7–3.1)
LYMPHOCYTES NFR BLD AUTO: 5.7 % (ref 19.6–45.3)
MCH RBC QN AUTO: 27.5 PG (ref 26.6–33)
MCHC RBC AUTO-ENTMCNC: 32.3 G/DL (ref 31.5–35.7)
MCV RBC AUTO: 85.4 FL (ref 79–97)
MONOCYTES # BLD AUTO: 0.31 10*3/MM3 (ref 0.1–0.9)
MONOCYTES NFR BLD AUTO: 2.2 % (ref 5–12)
NEUTROPHILS NFR BLD AUTO: 12.63 10*3/MM3 (ref 1.7–7)
NEUTROPHILS NFR BLD AUTO: 91.6 % (ref 42.7–76)
NRBC BLD AUTO-RTO: 0 /100 WBC (ref 0–0.2)
PLATELET # BLD AUTO: 308 10*3/MM3 (ref 140–450)
PMV BLD AUTO: 10.1 FL (ref 6–12)
POTASSIUM SERPL-SCNC: 3.7 MMOL/L (ref 3.5–5.2)
QT INTERVAL: 422 MS
QTC INTERVAL: 517 MS
RBC # BLD AUTO: 4.03 10*6/MM3 (ref 3.77–5.28)
SODIUM SERPL-SCNC: 142 MMOL/L (ref 136–145)
WBC NRBC COR # BLD AUTO: 13.8 10*3/MM3 (ref 3.4–10.8)

## 2024-03-06 PROCEDURE — 94799 UNLISTED PULMONARY SVC/PX: CPT

## 2024-03-06 PROCEDURE — 99214 OFFICE O/P EST MOD 30 MIN: CPT | Performed by: INTERNAL MEDICINE

## 2024-03-06 PROCEDURE — 80048 BASIC METABOLIC PNL TOTAL CA: CPT | Performed by: INTERNAL MEDICINE

## 2024-03-06 PROCEDURE — 25010000002 ENOXAPARIN PER 10 MG: Performed by: INTERNAL MEDICINE

## 2024-03-06 PROCEDURE — G0378 HOSPITAL OBSERVATION PER HR: HCPCS

## 2024-03-06 PROCEDURE — 93005 ELECTROCARDIOGRAM TRACING: CPT | Performed by: INTERNAL MEDICINE

## 2024-03-06 PROCEDURE — 93010 ELECTROCARDIOGRAM REPORT: CPT | Performed by: INTERNAL MEDICINE

## 2024-03-06 PROCEDURE — 96376 TX/PRO/DX INJ SAME DRUG ADON: CPT

## 2024-03-06 PROCEDURE — 25010000002 METHYLPREDNISOLONE PER 40 MG: Performed by: INTERNAL MEDICINE

## 2024-03-06 PROCEDURE — 85025 COMPLETE CBC W/AUTO DIFF WBC: CPT | Performed by: INTERNAL MEDICINE

## 2024-03-06 PROCEDURE — 96372 THER/PROPH/DIAG INJ SC/IM: CPT

## 2024-03-06 PROCEDURE — 63710000001 PREDNISONE PER 1 MG: Performed by: INTERNAL MEDICINE

## 2024-03-06 RX ORDER — DOCOSANOL 100 MG/G
1 CREAM TOPICAL
Qty: 2 G | Refills: 0 | Status: SHIPPED | OUTPATIENT
Start: 2024-03-06

## 2024-03-06 RX ORDER — IPRATROPIUM BROMIDE AND ALBUTEROL SULFATE 2.5; .5 MG/3ML; MG/3ML
3 SOLUTION RESPIRATORY (INHALATION)
Status: DISCONTINUED | OUTPATIENT
Start: 2024-03-06 | End: 2024-03-06 | Stop reason: HOSPADM

## 2024-03-06 RX ORDER — PREDNISONE 20 MG/1
40 TABLET ORAL
Status: DISCONTINUED | OUTPATIENT
Start: 2024-03-06 | End: 2024-03-06 | Stop reason: HOSPADM

## 2024-03-06 RX ORDER — PREDNISONE 10 MG/1
TABLET ORAL
Qty: 30 TABLET | Refills: 0 | Status: SHIPPED | OUTPATIENT
Start: 2024-03-07 | End: 2024-03-19

## 2024-03-06 RX ADMIN — PANTOPRAZOLE SODIUM 40 MG: 40 TABLET, DELAYED RELEASE ORAL at 06:20

## 2024-03-06 RX ADMIN — IPRATROPIUM BROMIDE AND ALBUTEROL SULFATE 3 ML: .5; 3 SOLUTION RESPIRATORY (INHALATION) at 08:05

## 2024-03-06 RX ADMIN — Medication 1 TABLET: at 09:33

## 2024-03-06 RX ADMIN — IPRATROPIUM BROMIDE AND ALBUTEROL SULFATE 3 ML: .5; 3 SOLUTION RESPIRATORY (INHALATION) at 11:27

## 2024-03-06 RX ADMIN — FUROSEMIDE 40 MG: 40 TABLET ORAL at 09:33

## 2024-03-06 RX ADMIN — ENOXAPARIN SODIUM 60 MG: 100 INJECTION SUBCUTANEOUS at 06:20

## 2024-03-06 RX ADMIN — CETIRIZINE HYDROCHLORIDE 10 MG: 10 TABLET ORAL at 09:33

## 2024-03-06 RX ADMIN — POTASSIUM CHLORIDE 20 MEQ: 750 TABLET, EXTENDED RELEASE ORAL at 09:33

## 2024-03-06 RX ADMIN — HYDROCODONE BITARTRATE AND ACETAMINOPHEN 1 TABLET: 7.5; 325 TABLET ORAL at 09:33

## 2024-03-06 RX ADMIN — SERTRALINE 100 MG: 100 TABLET, FILM COATED ORAL at 09:33

## 2024-03-06 RX ADMIN — CARVEDILOL 6.25 MG: 6.25 TABLET, FILM COATED ORAL at 09:33

## 2024-03-06 RX ADMIN — BUDESONIDE 0.5 MG: 0.5 INHALANT ORAL at 08:07

## 2024-03-06 RX ADMIN — AMLODIPINE BESYLATE 10 MG: 10 TABLET ORAL at 09:33

## 2024-03-06 RX ADMIN — ATORVASTATIN CALCIUM 40 MG: 20 TABLET, FILM COATED ORAL at 09:33

## 2024-03-06 RX ADMIN — GUAIFENESIN 1200 MG: 600 TABLET, EXTENDED RELEASE ORAL at 09:33

## 2024-03-06 RX ADMIN — IPRATROPIUM BROMIDE AND ALBUTEROL SULFATE 3 ML: .5; 3 SOLUTION RESPIRATORY (INHALATION) at 04:09

## 2024-03-06 RX ADMIN — LISINOPRIL 40 MG: 40 TABLET ORAL at 09:33

## 2024-03-06 RX ADMIN — ROFLUMILAST 500 MCG: 500 TABLET ORAL at 09:33

## 2024-03-06 RX ADMIN — METHYLPREDNISOLONE SODIUM SUCCINATE 40 MG: 40 INJECTION, POWDER, FOR SOLUTION INTRAMUSCULAR; INTRAVENOUS at 00:19

## 2024-03-06 RX ADMIN — MAGNESIUM OXIDE 400 MG (241.3 MG MAGNESIUM) TABLET 400 MG: TABLET at 09:33

## 2024-03-06 RX ADMIN — NYSTATIN 500000 UNITS: 100000 SUSPENSION ORAL at 09:33

## 2024-03-06 RX ADMIN — ASPIRIN 81 MG: 81 TABLET, COATED ORAL at 09:33

## 2024-03-06 RX ADMIN — PREDNISONE 40 MG: 20 TABLET ORAL at 09:33

## 2024-03-06 RX ADMIN — DOCOSANOL 1 APPLICATION: 100 CREAM TOPICAL at 12:39

## 2024-03-06 NOTE — PLAN OF CARE
Goal Outcome Evaluation:  Plan of Care Reviewed With: patient        Progress: improving  Vss. On 2l. HR between 40-80s, irregular.

## 2024-03-06 NOTE — DISCHARGE SUMMARY
[facility]    Date of Discharge:  3/6/2024    PCP: Javan Martinez MD    Discharge Diagnosis:   Active Hospital Problems    Diagnosis  POA    Leukocytosis [D72.829]  Yes    Elevated troponin [R79.89]  Yes    COPD exacerbation [J44.1]  Yes    Chronic diastolic CHF (congestive heart failure) [I50.32]  Yes    History of endocarditis [Z86.79]  Not Applicable    Chronic hypoxemic respiratory failure [J96.11]  Yes    Chronic low back pain [M54.50, G89.29]  Yes    S/P TVR (tricuspid valve repair) [Z98.890]  Not Applicable    Pulmonary hypertension [I27.20]  Yes    Hypertension [I10]  Yes    Hyperlipidemia [E78.5]  Yes    PAF (paroxysmal atrial fibrillation) [I48.0]  Yes      Resolved Hospital Problems   No resolved problems to display.          Consults       Date and Time Order Name Status Description    3/4/2024 11:32 AM Inpatient Infectious Diseases Consult Completed     3/3/2024  3:49 PM Inpatient Pulmonology Consult Completed     3/3/2024  3:49 PM Inpatient Cardiology Consult Completed     3/2/2024  7:47 PM [facility] (on-call MD unless specified) Details            Hospital Course  68 y.o. female with COPD (2L O2), HFpEF, HLD, HTN, status post bioprosthetic MVR, ALEJANDRA closure, A-fib (previously on warfarin, stopped due to GIB as well as maintenance of sinus rhythm) presenting with shortness of breath. She was admitted for COPD exacerbation. She had some vague groundglass attenuation left lower lobe on CT scan. She was seen by cardiology, pulmonology, and infectious diseases. Was not felt that she had any acute infection and antibiotics were discontinued. Steroids stopped because some mild hyperglycemia and leukocytosis. She is now down to her baseline oxygen requirement of 2 L/min. She feels much improved and would like to go home today. She will be discharged home on a steroid taper. Pulmonology has cleared her to be discharged home. Echocardiogram showed vegetation  on the mitral valve and both cardiology and infectious diseases felt it was calcified remnant from prior endocarditis. She had coag negative staph in her blood and ID felt that that was a contaminant. Cardiology recommended that she get back on anticoagulation and she is starting on apixaban today. Cardiology recommended stopping aspirin.    I discussed the patient's findings and my recommendations with patient, family, and nursing staff and Dr. Haddad.    Temp:  [97.7 °F (36.5 °C)-97.9 °F (36.6 °C)] 97.7 °F (36.5 °C)  Heart Rate:  [46-91] 60  Resp:  [16-20] 20  BP: (117-146)/(48-85) 146/85  Body mass index is 20.89 kg/m².    Physical Exam  Constitutional:       General: She is not in acute distress.     Appearance: Normal appearance. She is not toxic-appearing.   HENT:      Head: Normocephalic and atraumatic.   Cardiovascular:      Rate and Rhythm: Normal rate and regular rhythm.   Pulmonary:      Effort: Pulmonary effort is normal. No respiratory distress.      Breath sounds: Normal breath sounds. No wheezing or rhonchi.   Abdominal:      General: Bowel sounds are normal.      Palpations: Abdomen is soft.      Tenderness: There is no abdominal tenderness. There is no guarding or rebound.   Musculoskeletal:         General: No swelling.   Skin:     General: Skin is warm and dry.   Neurological:      General: No focal deficit present.      Mental Status: She is alert and oriented to person, place, and time.   Psychiatric:         Mood and Affect: Mood normal.         Behavior: Behavior normal.       Disposition: Home or Self Care       Discharge Medications        New Medications        Instructions Start Date   docosanol 10 % cream cream  Commonly known as: ABREVA   1 Application, Topical, 5 Times Daily      Eliquis 5 MG tablet tablet  Generic drug: apixaban   5 mg, Oral, Every 12 Hours Scheduled      predniSONE 10 MG tablet  Commonly known as: DELTASONE   Take 4 tablets by mouth Daily With Breakfast for 3  days, THEN 3 tablets Daily With Breakfast for 3 days, THEN 2 tablets Daily With Breakfast for 3 days, THEN 1 tablet Daily With Breakfast for 3 days.   Start Date: March 7, 2024            Changes to Medications        Instructions Start Date   nystatin 100,000 unit/mL suspension  Commonly known as: MYCOSTATIN  What changed:   how to take this  additional instructions   500,000 Units, Swish & Swallow, 4 Times Daily             Continue These Medications        Instructions Start Date   albuterol sulfate  (90 Base) MCG/ACT inhaler  Commonly known as: PROVENTIL HFA;VENTOLIN HFA;PROAIR HFA   2 puffs, Inhalation, Every 4 Hours PRN      amLODIPine 10 MG tablet  Commonly known as: NORVASC   TAKE 1 TABLET BY MOUTH EVERY DAY      atorvastatin 40 MG tablet  Commonly known as: LIPITOR   TAKE 1 TABLET BY MOUTH EVERY DAY      budesonide 0.5 MG/2ML nebulizer solution  Commonly known as: PULMICORT   0.5 mg, Nebulization, 2 Times Daily - RT      carvedilol 6.25 MG tablet  Commonly known as: COREG   TAKE 1 TABLET BY MOUTH TWICE A DAY WITH MEALS      cyclobenzaprine 10 MG tablet  Commonly known as: FLEXERIL   TAKE 1 TABLET BY MOUTH EVERYDAY AT BEDTIME      fish oil 1000 MG capsule capsule   Oral, Nightly      furosemide 40 MG tablet  Commonly known as: LASIX   TAKE 1 TABLET BY MOUTH TWICE A DAY      HYDROcodone-acetaminophen 7.5-325 MG per tablet  Commonly known as: NORCO   1 tablet, Oral, Every 6 Hours PRN      ipratropium-albuterol 0.5-2.5 mg/3 ml nebulizer  Commonly known as: DUO-NEB   3 mL, Nebulization, Every 4 Hours PRN      lisinopril 40 MG tablet  Commonly known as: PRINIVIL,ZESTRIL   40 mg, Oral, Daily      loratadine 10 MG tablet  Commonly known as: CLARITIN   10 mg, Oral, 2 times daily      Magnesium Oxide -Mg Supplement 400 (240 Mg) MG tablet   TAKE 1 TABLET BY MOUTH EVERY DAY      Multivital tablet  Generic drug: multivitamin with minerals   1 tablet, Oral, Daily      Nebulizer device   1 Units, Does not apply, 4  Times Daily PRN, J44.1 j20.8      O2  Commonly known as: OXYGEN   2 L/min, Inhalation, Continuous      omeprazole 40 MG capsule  Commonly known as: priLOSEC   TAKE 1 CAPSULE BY MOUTH EVERY DAY      ondansetron ODT 4 MG disintegrating tablet  Commonly known as: ZOFRAN-ODT   4 mg, Translingual, Every 8 Hours PRN      polyethylene glycol packet  Commonly known as: MIRALAX   17 g, Oral, 2 Times Daily      potassium chloride 20 MEQ CR tablet  Commonly known as: K-DUR,KLOR-CON   1 po tid      roflumilast 500 MCG tablet tablet  Commonly known as: DALIRESP   500 mcg, Oral, Daily      rOPINIRole 2 MG tablet  Commonly known as: REQUIP   TAKE 1 TABLET BY MOUTH EVERY DAY AT NIGHT      sertraline 100 MG tablet  Commonly known as: ZOLOFT   TAKE 1 TABLET BY MOUTH EVERY DAY      zolpidem 10 MG tablet  Commonly known as: AMBIEN   10 mg, Oral, Nightly PRN             Stop These Medications      aspirin 81 MG EC tablet     benzonatate 100 MG capsule  Commonly known as: TESSALON     ondansetron 4 MG tablet  Commonly known as: ZOFRAN             Diet Instructions       Diet: Regular/House Diet      Discharge Diet: Regular/House Diet    Texture: Regular Texture (IDDSI 7)    Fluid Consistency: Thin (IDDSI 0)           Activity Instructions       Activity as Tolerated             Additional Instructions for the Follow-ups that You Need to Schedule       Call MD for problems / concerns.   As directed             Follow-up Information       Javan Martinez MD Follow up on 3/19/2024.    Specialty: Internal Medicine  Why: at 3:45pm. please arrive at 3:30 with a photo ID and copay  Contact information:  [address]  [address]  [facility] [id number] [phone]               Kamar Haddad MD Follow up.    Specialty: Cardiology  Contact information:  [address]  [facility] [id number] [phone]               Luis A Murray MD .    Specialties: Pulmonary Disease, Intensive Care  Contact information:  [address]  [place] [id number]  [phone]                          No future appointments.  Pending Labs       Order Current Status    Blood Culture - Blood, Arm, Right Preliminary result           Ronald Marie MD  [facility]  03/06/24    Discharge time spent greater than 30 minutes.

## 2024-03-06 NOTE — PROGRESS NOTES
[facility] INPATIENT PROGRESS NOTE         [address]    3/6/2024      PATIENT IDENTIFICATION:  Name: Paz Browne ADMIT: 3/2/2024   : 1955  PCP: Javan Martinez MD    MRN: [id number] LOS: 0 days   AGE/SEX: 68 y.o. female  ROOM: [facility]                     LOS 0    Reason for visit: AECOPD and PNA      SUBJECTIVE:   No new issues overnight.    Objective   OBJECTIVE:    Vital Sign Min/Max for last 24 hours  Temp  Min: 97.7 °F (36.5 °C)  Max: 97.9 °F (36.6 °C)   BP  Min: 117/51  Max: 143/40   Pulse  Min: 46  Max: 85   Resp  Min: 16  Max: 20   SpO2  Min: 93 %  Max: 100 %   No data recorded   No data recorded    Vitals:    24 1947 24 2242 24 2323 24 0409   BP: 141/68  117/51    BP Location: Right arm  Right arm    Patient Position: Lying  Lying    Pulse: 68 84 74 (!) 48   Resp: 18 18 18 16   Temp: 97.9 °F (36.6 °C)  97.7 °F (36.5 °C)    TempSrc: Oral  Oral    SpO2: 100% 95% 98% 94%   Weight:       Height:                24  0453 24  0954 24  0603   Weight: 59.7 kg (131 lb 11.2 oz) 59.7 kg (131 lb 9.8 oz) 60.4 kg (133 lb 1.6 oz)       Body mass index is 20.89 kg/m².                          Body mass index is 20.89 kg/m².    Intake/Output Summary (Last 24 hours) at 3/6/2024 0721  Last data filed at 3/5/2024 1745  Gross per 24 hour   Intake 920 ml   Output 850 ml   Net 70 ml         Exam:  GEN:  No distress, appears stated age  NECK:  midline trachea  LUNGS: Nonlabored      Assessment     Scheduled meds:  amLODIPine, 10 mg, Oral, Daily  aspirin, 81 mg, Oral, Daily  atorvastatin, 40 mg, Oral, Daily  budesonide, 0.5 mg, Nebulization, BID - RT  carvedilol, 6.25 mg, Oral, BID With Meals  cetirizine, 10 mg, Oral, Daily  docosanol, 1 Application, Topical, 5x Daily  enoxaparin, 1 mg/kg, Subcutaneous, Q12H  furosemide, 40 mg, Oral, BID  guaiFENesin, 1,200 mg, Oral, Q12H  ipratropium-albuterol, 3 mL, Nebulization, Q4H - RT  lisinopril, 40 mg, Oral,  Daily  Magnesium Oxide -Mg Supplement, 1 tablet, Oral, Daily  methylPREDNISolone sodium succinate, 40 mg, Intravenous, Q12H  multivitamin with minerals, 1 tablet, Oral, Daily  nystatin, 500,000 Units, Swish & Spit, 4x Daily  pantoprazole, 40 mg, Oral, Q AM  polyethylene glycol, 17 g, Oral, Daily  potassium chloride, 20 mEq, Oral, Daily  roflumilast, 500 mcg, Oral, Daily  rOPINIRole, 2 mg, Oral, Nightly  sertraline, 100 mg, Oral, Daily      IV meds:                           Data Review:  Results from last 7 days   Lab Units 03/06/24 0418 03/05/24  0423 03/04/24  0501 03/03/24  0438 03/02/24  1820   SODIUM mmol/L 142 140 140 141 140   POTASSIUM mmol/L 3.7 3.8 3.8 4.4 3.7   CHLORIDE mmol/L 98 99 100 99 99   CO2 mmol/L 31.1* 31.0* 28.6 26.8 29.4*   BUN mg/dL 19 19 11 9 11   CREATININE mg/dL 0.87 1.07* 0.75 0.75 0.81   GLUCOSE mg/dL 119* 141* 145* 170* 115*   CALCIUM mg/dL 8.9 9.2 9.1 9.3 8.6         Estimated Creatinine Clearance: 57.4 mL/min (by C-G formula based on SCr of 0.87 mg/dL).  Results from last 7 days   Lab Units 03/06/24 0418 03/05/24  0423 03/04/24  0501 03/03/24  0438 03/02/24  1820   WBC 10*3/mm3 13.80* 14.24* 14.66* 7.80 14.18*   HEMOGLOBIN g/dL 11.1* 10.2* 10.1* 10.5* 10.9*   PLATELETS 10*3/mm3 308 278 243 241 231         Results from last 7 days   Lab Units 03/02/24  1820   ALT (SGPT) U/L 11   AST (SGOT) U/L 14         Results from last 7 days   Lab Units 03/02/24  1820   PROCALCITONIN ng/mL 0.06   LACTATE mmol/L 1.1         Hemoglobin A1C   Date/Time Value Ref Range Status   03/03/2024 1550 5.00 4.80 - 5.60 % Final         Imaging reviewed  CXR and CT chest reviewed      2D echo 3/3 reviewed      Microbiology reviewed  Ester negron in blood cx          Active Hospital Problems    Diagnosis  POA    Leukocytosis [D72.829]  Yes    Elevated troponin [R79.89]  Yes    COPD exacerbation [J44.1]  Yes    Chronic diastolic CHF (congestive heart failure) [I50.32]  Yes    History of endocarditis [Z86.79]   Not Applicable    Chronic hypoxemic respiratory failure [J96.11]  Yes    Chronic low back pain [M54.50, G89.29]  Yes    S/P TVR (tricuspid valve repair) [Z98.890]  Not Applicable    Pulmonary hypertension [I27.20]  Yes    Hypertension [I10]  Yes    Hyperlipidemia [E78.5]  Yes    PAF (paroxysmal atrial fibrillation) [I48.0]  Yes      Resolved Hospital Problems   No resolved problems to display.         ASSESSMENT:  Acute exacerbation of COPD  Chronic diastolic CHF  Atrial flutter  Valvular heart disease with aortic stenosis and bioprosthetic mitral valve with signs of vegetation on echo  Pulmonary hypertension  Celiac artery stenosis      PLAN:  Continue bronchodilators.  Steroid taper. Change back to po today.   Wean off oxygen as able.   No objection to discharge.  Following peripherally for pulmonary issues and defer all other management to admitting service.      Luis A Murray MD  Pulmonary and Critical Care Medicine  [facility], [facility]  3/6/2024    07:21 EST

## 2024-03-06 NOTE — PLAN OF CARE
Goal Outcome Evaluation:  Plan of Care Reviewed With: patient        Progress: improving  Outcome Evaluation: Pt encouraged to increase activity- at least walk to bathroom over bsc. Pt tolerating well. Remains on 2L NC. C/o fever blisters- abreva ordered and administered. Bradycardic at times. VSS. Safety maintained

## 2024-03-06 NOTE — PROGRESS NOTES
LOS: 0 days   Patient Care Team:  Javan Martinez MD as PCP - General (Internal Medicine)  Ag Melo Jr., MD as Consulting Physician (Pulmonary Disease)  Cleveland Devine MD as Consulting Physician (Gastroenterology)  Earnest Gan MD as Consulting Physician (Cardiology)  Daniela Shin MD PhD as Consulting Physician (Hematology and Oncology)    Chief Complaint: Follow-up COPD exacerbation, tissue mitral valve replacement, prior bioprosthetic endocarditis, paroxysmal atrial fibrillation.    Interval History: She is doing better in general.  Still some shortness of breath, but improved.  She is back in rhythm with frequent PACs    Vital Signs:  Temp:  [97.7 °F (36.5 °C)-97.9 °F (36.6 °C)] 97.7 °F (36.5 °C)  Heart Rate:  [46-91] 60  Resp:  [16-20] 20  BP: (117-146)/(51-85) 146/85    Intake/Output Summary (Last 24 hours) at 3/6/2024 1444  Last data filed at 3/5/2024 1745  Gross per 24 hour   Intake 320 ml   Output --   Net 320 ml       Physical Exam:   General Appearance:    No acute distress, alert and oriented x4   Lungs:     Decreased breath sounds and rhonchi bilaterally.     Heart:    Regular rhythm with ectopy and normal rate.  II/VI SM throughout.    Abdomen:     Soft, nontender, nondistended.    Extremities:   No clubbing, cyanosis, or edema.     Results Review:    Results from last 7 days   Lab Units 03/06/24  0418   SODIUM mmol/L 142   POTASSIUM mmol/L 3.7   CHLORIDE mmol/L 98   CO2 mmol/L 31.1*   BUN mg/dL 19   CREATININE mg/dL 0.87   GLUCOSE mg/dL 119*   CALCIUM mg/dL 8.9     Results from last 7 days   Lab Units 03/03/24  0006 03/02/24  2205 03/02/24  1820   HSTROP T ng/L 20* 23* 27*     Results from last 7 days   Lab Units 03/06/24  0418   WBC 10*3/mm3 13.80*   HEMOGLOBIN g/dL 11.1*   HEMATOCRIT % 34.4   PLATELETS 10*3/mm3 308                       I reviewed the patient's new clinical results.        Assessment:  1.  COPD with acute exacerbation  2.  Community-acquired pneumonia  3.   Chronic hypoxic respiratory failure, on home oxygen  4.  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  History of streptococcal bioprosthetic MVR endocarditis in 2021, treated with 6 weeks of antibiotics.  Persistent calcified vegetation remnant on MVR.  6.  Paroxysmal atrial fibrillation  7.  Coag negative staph bacteremia, possible contaminant  8.  Celiac artery stenosis  9.  Anemia of chronic disease  10.  History of GI bleed in 2017 while on warfarin and following shortly after a colonoscopy  11.  Mild aortic stenosis  12.  Hypertension     Plan:  -Again, this is her first recurrence of atrial fibrillation in quite some time from my review of the chart.  She had a GI bleed while on warfarin in 2017, which evidently occurred shortly after her colonoscopy per the patient.      -Spoke with the patient about anticoagulation.  At this point, I feel that she warrants another try at systemic anticoagulation.  I am going to start her on Eliquis 5 mg twice daily.  She does have a bioprosthetic aortic valve, although she is far out from her surgery, and I really do not feel that this qualifies as valvular atrial fibrillation at this point.    -She does have some bruising noted on her upper extremities.  I told her to watch for any significant increase in bruising, or any bleeding (including in the urine, stool, or other areas).  I also told her to stop the aspirin for now.    -I still feel that the calcified entity on the tissue mitral valve replacement is likely a calcified remnant from prior endocarditis.  She has coag negative staph in her blood, which infectious disease feels is a contaminant.  Antibiotics have been stopped.    -Continue Lasix 40 mg p.o. twice daily.  She appears euvolemic.    -Continue Coreg 6.25 mg twice daily.    -She is stable for discharge from a cardiac standpoint.  I will arrange for follow-up in the office within the  next several weeks.  Discussed with Dr. Marie.    Kamar Haddad MD  03/06/24  14:44 EST

## 2024-03-06 NOTE — CASE MANAGEMENT/SOCIAL WORK
Case Management Discharge Note      Final Note: Home, family to transport    Provided Post Acute Provider List?: N/A  Provided Post Acute Provider Quality & Resource List?: N/A    Selected Continued Care - Discharged on 3/6/2024 Admission date: 3/2/2024 - Discharge disposition: Home or Self Care      Destination    No services have been selected for the patient.                Durable Medical Equipment    No services have been selected for the patient.                Dialysis/Infusion    No services have been selected for the patient.                Home Medical Care    No services have been selected for the patient.                Therapy    No services have been selected for the patient.                Community Resources    No services have been selected for the patient.                Community & DME    No services have been selected for the patient.                         Final Discharge Disposition Code: 01 - home or self-care

## 2024-03-06 NOTE — PLAN OF CARE
Goal Outcome Evaluation:  Plan of Care Reviewed With: patient        Progress: improving

## 2024-03-07 LAB — BACTERIA SPEC AEROBE CULT: NORMAL

## 2024-03-07 NOTE — OUTREACH NOTE
Prep Survey      Flowsheet Row Responses   [facility] patient discharged from? [place]   Is LACE score < 7 ? No   Eligibility Readm Mgmt   Discharge diagnosis COPD exacerbation   Does the patient have one of the following disease processes/diagnoses(primary or secondary)? COPD   Does the patient have Home health ordered? No   Is there a DME ordered? No   Prep survey completed? Yes            Jena KRAUS - Registered Nurse

## 2024-03-11 ENCOUNTER — READMISSION MANAGEMENT (OUTPATIENT)
Dept: CALL CENTER | Facility: HOSPITAL | Age: 69
End: 2024-03-11
Payer: MEDICARE

## 2024-03-11 NOTE — OUTREACH NOTE
COPD/PN Week 1 Survey      Flowsheet Row Responses   [facility] patient discharged from? [place]   Does the patient have one of the following disease processes/diagnoses(primary or secondary)? COPD   Week 1 attempt successful? Yes   Call start time 1635   Call end time 1636   Discharge diagnosis COPD exacerbation   Person spoke with today (if not patient) and relationship [place]   Meds reviewed with patient/caregiver? Yes   Is the patient having any side effects they believe may be caused by any medication additions or changes? No   Does the patient have all medications ordered at discharge? Yes   Is the patient taking all medications as directed (includes completed medication regime)? Yes   Does the patient have a primary care provider?  Yes   Does the patient have an appointment with their PCP or specialist within 7 days of discharge? Yes   Comments regarding PCP PCP-Dr. Martinez on 03/19   Has the patient kept scheduled appointments due by today? Yes   Psychosocial issues? No   Week 1 call completed? Yes   Is the patient interested in additional calls from an ambulatory ? No   Would this patient benefit from a Referral to Amb Social Work? No   Wrap up additional comments Brief call per spouse.  He states she is doing well.  Has meds.  Appts made.  Denies needs.   Call end time 1636            LARISSA HOOKS - Registered Nurse

## 2024-03-20 ENCOUNTER — READMISSION MANAGEMENT (OUTPATIENT)
Dept: CALL CENTER | Facility: HOSPITAL | Age: 69
End: 2024-03-20
Payer: MEDICARE

## 2024-03-20 ENCOUNTER — OFFICE VISIT (OUTPATIENT)
Dept: CARDIOLOGY | Facility: CLINIC | Age: 69
End: 2024-03-20
Payer: MEDICARE

## 2024-03-20 VITALS
WEIGHT: 134 LBS | HEIGHT: 67 IN | SYSTOLIC BLOOD PRESSURE: 138 MMHG | HEART RATE: 72 BPM | DIASTOLIC BLOOD PRESSURE: 74 MMHG | BODY MASS INDEX: 21.03 KG/M2 | OXYGEN SATURATION: 93 %

## 2024-03-20 DIAGNOSIS — Z95.4 HISTORY OF MITRAL VALVE REPLACEMENT WITH TISSUE GRAFT: ICD-10-CM

## 2024-03-20 DIAGNOSIS — J96.11 CHRONIC HYPOXEMIC RESPIRATORY FAILURE: ICD-10-CM

## 2024-03-20 DIAGNOSIS — I10 PRIMARY HYPERTENSION: ICD-10-CM

## 2024-03-20 DIAGNOSIS — Z98.890 S/P TVR (TRICUSPID VALVE REPAIR): ICD-10-CM

## 2024-03-20 DIAGNOSIS — I48.0 PAF (PAROXYSMAL ATRIAL FIBRILLATION): Primary | ICD-10-CM

## 2024-03-20 NOTE — ASSESSMENT & PLAN NOTE
BP controlled in clinic at 138/74  Continue the following regimen:  Amlodipine 10 mg/day  Carvedilol 6.25 mg twice daily  Furosemide 40 mg twice daily  Lisinopril 40 mg/day

## 2024-03-20 NOTE — OUTREACH NOTE
COPD/PN Week 2 Survey      Flowsheet Row Responses   [facility] patient discharged from? [place]   Does the patient have one of the following disease processes/diagnoses(primary or secondary)? COPD   Week 2 attempt successful? Yes   Call start time 1826   Call end time 1828   Discharge diagnosis COPD exacerbation   Person spoke with today (if not patient) and relationship [place]   Meds reviewed with patient/caregiver? Yes   Is the patient having any side effects they believe may be caused by any medication additions or changes? No   Does the patient have all medications ordered at discharge? Yes   Is the patient taking all medications as directed (includes completed medication regime)? Yes   Does the patient have a primary care provider?  Yes   Does the patient have an appointment with their PCP or specialist within 7 days of discharge? Yes   Has the patient kept scheduled appointments due by today? Yes   Pulse Ox monitoring Intermittent   Pulse Ox device source Patient   O2 Sat: education provided Sat levels, Monitoring frequency, When to seek care   Psychosocial issues? No   Did the patient receive a copy of their discharge instructions? Yes   Nursing interventions Reviewed instructions with patient   What is the patient's perception of their health status since discharge? Improving   Nursing Interventions Nurse provided patient education   Are the patient's immunizations up to date?  Yes   Nursing interventions Educated on importance of maintaining up to date immunizations as advised by provider   If the patient is a current smoker, are they able to teach back resources for cessation? Not a smoker   Is the patient/caregiver able to teach back the hierarchy of who to call/visit for symptoms/problems? PCP, Specialist, Home health nurse, Urgent Care, ED, 911 Yes   Is the patient able to teach back COPD zones? Yes   Nursing interventions Education provided on various zones   Patient reports what zone on this  call? Green Zone   Green Zone Reports doing well, Breathing without shortness of breath   Week 2 call completed? Yes   Is the patient interested in additional calls from an ambulatory ? No   Would this patient benefit from a Referral to [facility] Social Work? No   Wrap up additional comments Brief call per spouse.  He states she is doing well.  Has meds.  Appts made.  Denies needs.   Call end time 1828            LIAM CLINTON - Registered Nurse

## 2024-03-20 NOTE — ASSESSMENT & PLAN NOTE
Heart rate controlled in clinic at 72  Continue carvedilol 6.25 mg twice daily  Patient anticoagulated with apixaban.  There is concern that patient's atrial fibrillation is valvular, however she has not been on anticoagulation secondary to history of GI bleeding while on warfarin.  We discussed risk-benefit ratio of apixaban over warfarin in the setting of history of GI bleed.  Patient understands while she is on apixaban.

## 2024-03-20 NOTE — PROGRESS NOTES
CARDIOLOGY        Patient Name: Paz Browne  :1955  Age: 68 y.o.  Primary Cardiologist: Previous [facility] patient  Encounter Provider:  BA Kowalski    Date of Service: 24    CHIEF COMPLAINT / REASON FOR OFFICE VISIT     No chief complaint on file.      HISTORY OF PRESENT ILLNESS       HPI  Paz Browne is a 68 y.o. female who presents today for hospital follow-up.     Pt has a  history significant for paroxysmal atrial fibrillation, status post tricuspid valve repair, status post mitral valve replacement, COPD, HTN, pulmonary hypertension, GI bleed.    Patient is a previous  patient.  In  patient was evaluated rapid atrial fibrillation with CHF.  Echocardiogram revealed normal LV systolic function and severely dilated left atrium.  Patient had severe mitral and tricuspid valve insufficiency with severe pulmonary hypertension.  She was diuresed and heart rate was controlled.  Patient then had a CHIO which confirmed severe mitral and severe tricuspid valve regurgitation.  Cardiac catheterization revealed no significant coronary artery disease but severe mitral valve insufficiency.  Patient had urgent mitral valve repair with 26 annuloplasty ring which was unsuccessful and had to be replaced with a 31 mm porcine Saint Leopoldo prosthesis.  Patient also had tricuspid valve repair.  Patient did well postoperatively.  She has been anticoagulated with warfarin given her mitral valve disease.    In  patient had GI bleeding while on warfarin and she has not been anticoagulated since that time.    In May 2021 patient was found to have hyperkalemia, Otilia was then admitted again with COPD exacerbation and was positive for coronavirus.  There was some concern for endocarditis and patient had a echocardiogram which revealed a bioprosthetic mitral valve which appeared normal.  She had a CHIO which revealed normal ventricular systolic function with mild calcification of aortic  "valve with trace aortic valve regurgitation and a bioprosthetic mitral valve with echo bright density on the anterior surface that was suspicious for vegetation.  She was discharged home with IV antibiotic infusion.    Medical record review reveals that patient was hospitalized 3/6/2024 for COPD exacerbation.  She was evaluated by pulmonology, cardiology and infectious disease.  Steroids were stopped due to hyperglycemia and leukocytosis.  Patient was evaluated by cardiology, due to her history of GI bleeding with warfarin she was started on apixaban.  Patient did have a left atrial appendage closed in 2014.  She was previously on warfarin secondary to concern for valvular atrial fibrillation.  There was concerned for vegetation on patient's aortic valve, Dr. Haddad personally reviewed previous CHIO images and echocardiogram images and felt that this was a calcified entity as she did not have any symptoms consistent with endocarditis.    Patient presents today for hospital follow-up.  Patient reports that she has done well since discharge from the hospital.  She is still generally weak and gradually regaining her strength.  Her dyspnea with exertion is improving and she remains on supplemental oxygen.  She has not had any hematuria or melena.  Denies chest pain, lightheadedness, edema.    The following portions of the patient's history were reviewed and updated as appropriate: allergies, current medications, past family history, past medical history, past social history, past surgical history and problem list.      VITAL SIGNS     Visit Vitals  /74   Pulse 72   Ht 170 cm (66.93\")   Wt 60.8 kg (134 lb)   SpO2 93%   BMI 21.03 kg/m²         Wt Readings from Last 3 Encounters:   03/20/24 60.8 kg (134 lb)   03/04/24 60.4 kg (133 lb 1.6 oz)   02/17/24 56.2 kg (124 lb)     Body mass index is 21.03 kg/m².      REVIEW OF SYSTEMS     Review of Systems   Constitutional: Positive for malaise/fatigue. Negative for " chills, fever, weight gain and weight loss.   Cardiovascular:  Positive for dyspnea on exertion. Negative for leg swelling.   Respiratory:  Positive for shortness of breath. Negative for cough, snoring and wheezing.    Hematologic/Lymphatic: Negative for bleeding problem. Does not bruise/bleed easily.   Skin:  Negative for color change.   Musculoskeletal:  Negative for falls, joint pain and myalgias.   Gastrointestinal:  Negative for melena.   Genitourinary:  Negative for hematuria.   Neurological:  Negative for excessive daytime sleepiness.   Psychiatric/Behavioral:  Negative for depression. The patient is not nervous/anxious.            PHYSICAL EXAMINATION     Vitals and nursing note reviewed.   Constitutional:       Appearance: Normal appearance. Well-developed.   Eyes:      Conjunctiva/sclera: Conjunctivae normal.   Neck:      Vascular: No carotid bruit.   Pulmonary:      Breath sounds: Decreased breath sounds present.      Comments: Supplemental oxygen  Cardiovascular:      Normal rate. Regular rhythm. Normal S1 with normal intensity. Normal S2 with normal intensity.       Murmurs: There is no murmur.      No gallop.  No click. No rub.   Edema:     Peripheral edema absent.   Musculoskeletal: Normal range of motion. Skin:     General: Skin is warm and dry.   Neurological:      Mental Status: Alert and oriented to person, place, and time.      GCS: GCS eye subscore is 4. GCS verbal subscore is 5. GCS motor subscore is 6.   Psychiatric:         Speech: Speech normal.         Behavior: Behavior normal.         Thought Content: Thought content normal.         Judgment: Judgment normal.           REVIEWED DATA     Procedures    Cardiac Procedures:  Cardiac catheterization 8/26/2014.  Severe mitral valve insufficiency, severe pulmonary insufficiency.  Echocardiogram 6/8/2016.  LVEF 56%.  All LV wall segments contract normally.  RV is moderately dilated.  Moderately reduced systolic function.  Mild to moderate  tricuspid valve regurgitation.  Severe pulmonary hypertension.  Cardiac catheterization 6/10/2016.  Moderate pulmonary hypertension that did not improve with vasodilator challenge.  Echocardiogram 7/2/2019.  LVEF 63%.  Calcification of the aortic valve.  Mild tricuspid valve regurgitation.  Bioprosthetic mitral valve present.  Calculated RVSP 60.4 mmHg.  Echocardiogram 4/30/2021.  LVEF 61-65%.  Left atrial cavity is mild to moderately dilated.  Bioprosthetic mitral valve replacement which appears to be well-seated.  Gradients across the tissue mitral valve replacement are normal and the valve appears to be opening normally.  Mitral valve mean gradient 5.0 mmHg.  Calculated RVSP 30 mmHg.  Echocardiogram 6/15/2021.  LVEF 50%.  Bioprosthetic mitral valve present.  Moderate tricuspid valve regurgitation.  Calculated RVSP 83.2 mmHg.  CHIO 7/6/2021.  LVEF 65%.  Mild calcification of the aortic valve.  Aortic valve appears to be trileaflet.  Trace aortic valve regurgitation.  Bioprosthetic mitral valve present.  Echo bright structure is noted on the atrial surface which appeared to be attached to the posterior mitral valve leaflet.  There are very well and only minimally visualized but remains suspicious for vegetation.  Mild to moderate perivalvular regurgitation.  There is a moderate grade 2 plaque in the descending aorta.  Echocardiogram 2/16/2022.  LVEF 54%.  Septal wall motion is abnormal.  Mild LVH.  RV cavity is mildly dilated.  Right atrium is not well-visualized.  Aortic valve is abnormal in structure with mild to moderate thickening of the aortic valve.  The aortic valve appears trileaflet.  No hemodynamically significant aortic valve stenosis.  There is a 31 mm porcine Saint Leopoldo bioprosthetic mitral valve.  Peak and mean gradients are within defined limits.  Calculated RVSP 48 mmHg.  Echocardiogram 3/3/2024.  Hyperdynamic LV.  LVH.  Bioprosthetic mitral valve present.  Moderate, nonmobile mass on the posterior  leaflet of the mitral valve that is consistent with questionable vegetation.  Mild aortic valve stenosis.  Aortic valve maximum pressure gradient 15 mmHg.  Mean pressure gradient 9 mmHg.  Mild mitral valve regurgitation.  Calculated RVSP 41 mmHg.        Lab Results   Component Value Date     03/06/2024     03/05/2024    K 3.7 03/06/2024    K 3.8 03/05/2024    CL 98 03/06/2024    CL 99 03/05/2024    CO2 31.1 (H) 03/06/2024    CO2 31.0 (H) 03/05/2024    BUN 19 03/06/2024    BUN 19 03/05/2024    CREATININE 0.87 03/06/2024    CREATININE 1.07 (H) 03/05/2024    EGFRIFNONA 61 08/16/2021    EGFRIFNONA 82 08/09/2021    EGFRIFAFRI 86 07/12/2021    GLUCOSE 119 (H) 03/06/2024    GLUCOSE 141 (H) 03/05/2024    CALCIUM 8.9 03/06/2024    CALCIUM 9.2 03/05/2024    ALBUMIN 3.9 03/02/2024    ALBUMIN 4.2 11/18/2023    BILITOT 0.7 03/02/2024    BILITOT 0.9 11/18/2023    AST 14 03/02/2024    AST 15 11/18/2023    ALT 11 03/02/2024    ALT 14 11/18/2023     Lab Results   Component Value Date    WBC 13.80 (H) 03/06/2024    WBC 14.24 (H) 03/05/2024    HGB 11.1 (L) 03/06/2024    HGB 10.2 (L) 03/05/2024    HCT 34.4 03/06/2024    HCT 31.7 (L) 03/05/2024    MCV 85.4 03/06/2024    MCV 84.8 03/05/2024     03/06/2024     03/05/2024     Lab Results   Component Value Date    PROBNP 892.0 03/02/2024    PROBNP 554.0 02/17/2024     Lab Results   Component Value Date    TROPONINT 20 (H) 03/03/2024     Lab Results   Component Value Date    TSH 0.512 10/23/2018    TSH 3.780 08/10/2017             ASSESSMENT & PLAN     Diagnoses and all orders for this visit:    1. PAF (paroxysmal atrial fibrillation) (Primary)  Overview:  CHADS-VASc Risk Assessment               5 Total Score    1 CHF    1 Hypertension    1 Vascular Disease    1 Age 65-74    1 Sex: Female    Criteria that do not apply:    Age >/= 75    DM    PRIOR STROKE/TIA/THROMBO              Assessment & Plan:  Heart rate controlled in clinic at 72  Continue carvedilol 6.25 mg  twice daily  Patient anticoagulated with apixaban.  There is concern that patient's atrial fibrillation is valvular, however she has not been on anticoagulation secondary to history of GI bleeding while on warfarin.  We discussed risk-benefit ratio of apixaban over warfarin in the setting of history of GI bleed.  Patient understands while she is on apixaban.      2. Primary hypertension  Assessment & Plan:  BP controlled in clinic at 138/74  Continue the following regimen:  Amlodipine 10 mg/day  Carvedilol 6.25 mg twice daily  Furosemide 40 mg twice daily  Lisinopril 40 mg/day      3. S/P TVR (tricuspid valve repair)  Overview:  Echocardiogram 3/3/2024.  Hyperdynamic LV.  LVH.  Bioprosthetic mitral valve present.  Moderate, nonmobile mass on the posterior leaflet of the mitral valve that is consistent with questionable vegetation.  Mild aortic valve stenosis.  Aortic valve maximum pressure gradient 15 mmHg.  Mean pressure gradient 9 mmHg.  Mild mitral valve regurgitation.  Calculated RVSP 41 mmHg.      4. History of mitral valve replacement with tissue graft  Overview:  Echocardiogram 3/3/2024.  Hyperdynamic LV.  LVH.  Bioprosthetic mitral valve present.  Moderate, nonmobile mass on the posterior leaflet of the mitral valve that is consistent with questionable vegetation.  Mild aortic valve stenosis.  Aortic valve maximum pressure gradient 15 mmHg.  Mean pressure gradient 9 mmHg.  Mild mitral valve regurgitation.  Calculated RVSP 41 mmHg.    Assessment & Plan:  Vegetation seen thought to be consistent with a calcified lesion.      5. Chronic hypoxemic respiratory failure    Other orders  -     apixaban (ELIQUIS) 5 MG tablet tablet; Take 1 tablet by mouth Every 12 (Twelve) Hours. Indications: Atrial Fibrillation  Dispense: 180 tablet; Refill: 3      Patient is a previous patient of Dr. Mccormick, recommend transitioning to Dr. Mendez.    Future Appointments         Provider Department Center    7/1/2024 9:30 AM  Akbar Mendez Jr., MD [facility] CARDIOLOGY [facility]          Time Spent: I spent 43 minutes caring for Paz on this date of service. This time includes time spent by me in the following activities: preparing for the visit, reviewing tests, performing a medically appropriate examination and/or evaluation, counseling and educating the patient/family/caregiver, ordering medications, tests, or procedures, documenting information in the medical record, and care coordination.         MEDICATIONS         Discharge Medications            Accurate as of March 20, 2024  1:23 PM. If you have any questions, ask your nurse or doctor.                Continue These Medications        Instructions Start Date   albuterol sulfate  (90 Base) MCG/ACT inhaler  Commonly known as: PROVENTIL HFA;VENTOLIN HFA;PROAIR HFA   2 puffs, Inhalation, Every 4 Hours PRN      amLODIPine 10 MG tablet  Commonly known as: NORVASC   TAKE 1 TABLET BY MOUTH EVERY DAY      apixaban 5 MG tablet tablet  Commonly known as: ELIQUIS   5 mg, Oral, Every 12 Hours Scheduled      atorvastatin 40 MG tablet  Commonly known as: LIPITOR   TAKE 1 TABLET BY MOUTH EVERY DAY      budesonide 0.5 MG/2ML nebulizer solution  Commonly known as: PULMICORT   0.5 mg, Nebulization, 2 Times Daily - RT      carvedilol 6.25 MG tablet  Commonly known as: COREG   TAKE 1 TABLET BY MOUTH TWICE A DAY WITH MEALS      cyclobenzaprine 10 MG tablet  Commonly known as: FLEXERIL   TAKE 1 TABLET BY MOUTH EVERYDAY AT BEDTIME      docosanol 10 % cream cream  Commonly known as: ABREVA   1 Application, Topical, 5 Times Daily      fish oil 1000 MG capsule capsule   Oral, Nightly      furosemide 40 MG tablet  Commonly known as: LASIX   TAKE 1 TABLET BY MOUTH TWICE A DAY      HYDROcodone-acetaminophen 7.5-325 MG per tablet  Commonly known as: NORCO   1 tablet, Oral, Every 6 Hours PRN      ipratropium-albuterol 0.5-2.5 mg/3 ml nebulizer  Commonly known as: DUO-NEB   3 mL,  Nebulization, Every 4 Hours PRN      lisinopril 40 MG tablet  Commonly known as: PRINIVIL,ZESTRIL   40 mg, Oral, Daily      loratadine 10 MG tablet  Commonly known as: CLARITIN   10 mg, Oral, 2 times daily      Magnesium Oxide -Mg Supplement 400 (240 Mg) MG tablet   TAKE 1 TABLET BY MOUTH EVERY DAY      Multivital tablet  Generic drug: multivitamin with minerals   1 tablet, Oral, Daily      Nebulizer device   1 Units, Does not apply, 4 Times Daily PRN, J44.1 j20.8      nystatin 100,000 unit/mL suspension  Commonly known as: MYCOSTATIN   500,000 Units, Swish & Swallow, 4 Times Daily      O2  Commonly known as: OXYGEN   2 L/min, Inhalation, Continuous      omeprazole 40 MG capsule  Commonly known as: priLOSEC   TAKE 1 CAPSULE BY MOUTH EVERY DAY      ondansetron ODT 4 MG disintegrating tablet  Commonly known as: ZOFRAN-ODT   4 mg, Translingual, Every 8 Hours PRN      polyethylene glycol packet  Commonly known as: MIRALAX   17 g, Oral, 2 Times Daily      potassium chloride 20 MEQ CR tablet  Commonly known as: KLOR-CON M20   1 po tid      roflumilast 500 MCG tablet tablet  Commonly known as: DALIRESP   500 mcg, Oral, Daily      rOPINIRole 2 MG tablet  Commonly known as: REQUIP   TAKE 1 TABLET BY MOUTH EVERY DAY AT NIGHT      sertraline 100 MG tablet  Commonly known as: ZOLOFT   TAKE 1 TABLET BY MOUTH EVERY DAY      zolpidem 10 MG tablet  Commonly known as: AMBIEN   10 mg, Oral, Nightly PRN                   **Dragon Disclaimer:   Much of this encounter note is an electronic transcription/translation of spoken language to printed text. The electronic translation of spoken language may permit erroneous, or at times, nonsensical words or phrases to be inadvertently transcribed. Although I have reviewed the note for such errors, some may still exist.

## 2024-03-23 ENCOUNTER — APPOINTMENT (OUTPATIENT)
Dept: GENERAL RADIOLOGY | Facility: HOSPITAL | Age: 69
End: 2024-03-23
Payer: MEDICARE

## 2024-03-23 ENCOUNTER — HOSPITAL ENCOUNTER (EMERGENCY)
Facility: HOSPITAL | Age: 69
Discharge: HOME | End: 2024-03-23
Attending: EMERGENCY MEDICINE
Payer: MEDICARE

## 2024-03-23 VITALS
OXYGEN SATURATION: 97 % | RESPIRATION RATE: 16 BRPM | HEART RATE: 70 BPM | BODY MASS INDEX: 21.03 KG/M2 | SYSTOLIC BLOOD PRESSURE: 133 MMHG | DIASTOLIC BLOOD PRESSURE: 66 MMHG | HEIGHT: 67 IN | TEMPERATURE: 98.8 F | WEIGHT: 134 LBS

## 2024-03-23 DIAGNOSIS — J01.90 ACUTE SINUSITIS, RECURRENCE NOT SPECIFIED, UNSPECIFIED LOCATION: Primary | ICD-10-CM

## 2024-03-23 DIAGNOSIS — Z79.01 ANTICOAGULATED: ICD-10-CM

## 2024-03-23 DIAGNOSIS — R07.89 ATYPICAL CHEST PAIN: ICD-10-CM

## 2024-03-23 DIAGNOSIS — J96.11 CHRONIC RESPIRATORY FAILURE WITH HYPOXIA: ICD-10-CM

## 2024-03-23 LAB
ALBUMIN SERPL-MCNC: 3.7 G/DL (ref 3.5–5.2)
ALBUMIN/GLOB SERPL: 1.3 G/DL
ALP SERPL-CCNC: 74 U/L (ref 39–117)
ALT SERPL W P-5'-P-CCNC: 26 U/L (ref 1–33)
ANION GAP SERPL CALCULATED.3IONS-SCNC: 12.4 MMOL/L (ref 5–15)
AST SERPL-CCNC: 32 U/L (ref 1–32)
B PARAPERT DNA SPEC QL NAA+PROBE: NOT DETECTED
B PERT DNA SPEC QL NAA+PROBE: NOT DETECTED
BASOPHILS # BLD AUTO: 0.02 10*3/MM3 (ref 0–0.2)
BASOPHILS NFR BLD AUTO: 0.3 % (ref 0–1.5)
BILIRUB SERPL-MCNC: 0.7 MG/DL (ref 0–1.2)
BUN SERPL-MCNC: 15 MG/DL (ref 8–23)
BUN/CREAT SERPL: 15.5 (ref 7–25)
C PNEUM DNA NPH QL NAA+NON-PROBE: NOT DETECTED
CALCIUM SPEC-SCNC: 9 MG/DL (ref 8.6–10.5)
CHLORIDE SERPL-SCNC: 98 MMOL/L (ref 98–107)
CO2 SERPL-SCNC: 26.6 MMOL/L (ref 22–29)
CREAT SERPL-MCNC: 0.97 MG/DL (ref 0.57–1)
D-LACTATE SERPL-SCNC: 1.9 MMOL/L (ref 0.5–2)
DEPRECATED RDW RBC AUTO: 42 FL (ref 37–54)
EGFRCR SERPLBLD CKD-EPI 2021: 63 ML/MIN/1.73
EOSINOPHIL # BLD AUTO: 0.01 10*3/MM3 (ref 0–0.4)
EOSINOPHIL NFR BLD AUTO: 0.1 % (ref 0.3–6.2)
ERYTHROCYTE [DISTWIDTH] IN BLOOD BY AUTOMATED COUNT: 13.7 % (ref 12.3–15.4)
FLUAV SUBTYP SPEC NAA+PROBE: NOT DETECTED
FLUBV RNA ISLT QL NAA+PROBE: NOT DETECTED
GLOBULIN UR ELPH-MCNC: 2.8 GM/DL
GLUCOSE SERPL-MCNC: 142 MG/DL (ref 65–99)
HADV DNA SPEC NAA+PROBE: NOT DETECTED
HCOV 229E RNA SPEC QL NAA+PROBE: NOT DETECTED
HCOV HKU1 RNA SPEC QL NAA+PROBE: NOT DETECTED
HCOV NL63 RNA SPEC QL NAA+PROBE: NOT DETECTED
HCOV OC43 RNA SPEC QL NAA+PROBE: NOT DETECTED
HCT VFR BLD AUTO: 28.5 % (ref 34–46.6)
HGB BLD-MCNC: 9.4 G/DL (ref 12–15.9)
HMPV RNA NPH QL NAA+NON-PROBE: NOT DETECTED
HOLD SPECIMEN: NORMAL
HPIV1 RNA ISLT QL NAA+PROBE: NOT DETECTED
HPIV2 RNA SPEC QL NAA+PROBE: NOT DETECTED
HPIV3 RNA NPH QL NAA+PROBE: NOT DETECTED
HPIV4 P GENE NPH QL NAA+PROBE: NOT DETECTED
LYMPHOCYTES # BLD AUTO: 0.24 10*3/MM3 (ref 0.7–3.1)
LYMPHOCYTES NFR BLD AUTO: 3.4 % (ref 19.6–45.3)
M PNEUMO IGG SER IA-ACNC: NOT DETECTED
MCH RBC QN AUTO: 27.7 PG (ref 26.6–33)
MCHC RBC AUTO-ENTMCNC: 33 G/DL (ref 31.5–35.7)
MCV RBC AUTO: 84.1 FL (ref 79–97)
MONOCYTES # BLD AUTO: 0.24 10*3/MM3 (ref 0.1–0.9)
MONOCYTES NFR BLD AUTO: 3.4 % (ref 5–12)
NEUTROPHILS NFR BLD AUTO: 6.54 10*3/MM3 (ref 1.7–7)
NEUTROPHILS NFR BLD AUTO: 92.1 % (ref 42.7–76)
NT-PROBNP SERPL-MCNC: 1033 PG/ML (ref 0–900)
PLATELET # BLD AUTO: 109 10*3/MM3 (ref 140–450)
PMV BLD AUTO: 10.6 FL (ref 6–12)
POTASSIUM SERPL-SCNC: 3.9 MMOL/L (ref 3.5–5.2)
PROCALCITONIN SERPL-MCNC: 0.39 NG/ML (ref 0–0.25)
PROT SERPL-MCNC: 6.5 G/DL (ref 6–8.5)
RBC # BLD AUTO: 3.39 10*6/MM3 (ref 3.77–5.28)
RHINOVIRUS RNA SPEC NAA+PROBE: NOT DETECTED
RSV RNA NPH QL NAA+NON-PROBE: NOT DETECTED
SARS-COV-2 RNA NPH QL NAA+NON-PROBE: NOT DETECTED
SODIUM SERPL-SCNC: 137 MMOL/L (ref 136–145)
TROPONIN T SERPL HS-MCNC: 25 NG/L
WBC NRBC COR # BLD AUTO: 7.1 10*3/MM3 (ref 3.4–10.8)
WHOLE BLOOD HOLD COAG: NORMAL
WHOLE BLOOD HOLD SPECIMEN: NORMAL

## 2024-03-23 PROCEDURE — 85025 COMPLETE CBC W/AUTO DIFF WBC: CPT

## 2024-03-23 PROCEDURE — 0202U NFCT DS 22 TRGT SARS-COV-2: CPT | Performed by: EMERGENCY MEDICINE

## 2024-03-23 PROCEDURE — 80053 COMPREHEN METABOLIC PANEL: CPT

## 2024-03-23 PROCEDURE — 99284 EMERGENCY DEPT VISIT MOD MDM: CPT

## 2024-03-23 PROCEDURE — 94664 DEMO&/EVAL PT USE INHALER: CPT

## 2024-03-23 PROCEDURE — 84145 PROCALCITONIN (PCT): CPT | Performed by: EMERGENCY MEDICINE

## 2024-03-23 PROCEDURE — 83605 ASSAY OF LACTIC ACID: CPT | Performed by: EMERGENCY MEDICINE

## 2024-03-23 PROCEDURE — 83880 ASSAY OF NATRIURETIC PEPTIDE: CPT

## 2024-03-23 PROCEDURE — 94799 UNLISTED PULMONARY SVC/PX: CPT

## 2024-03-23 PROCEDURE — 93010 ELECTROCARDIOGRAM REPORT: CPT | Performed by: INTERNAL MEDICINE

## 2024-03-23 PROCEDURE — 94761 N-INVAS EAR/PLS OXIMETRY MLT: CPT

## 2024-03-23 PROCEDURE — 94640 AIRWAY INHALATION TREATMENT: CPT

## 2024-03-23 PROCEDURE — 71045 X-RAY EXAM CHEST 1 VIEW: CPT

## 2024-03-23 PROCEDURE — 84484 ASSAY OF TROPONIN QUANT: CPT

## 2024-03-23 PROCEDURE — 93005 ELECTROCARDIOGRAM TRACING: CPT

## 2024-03-23 RX ORDER — IPRATROPIUM BROMIDE AND ALBUTEROL SULFATE 2.5; .5 MG/3ML; MG/3ML
3 SOLUTION RESPIRATORY (INHALATION) ONCE
Status: COMPLETED | OUTPATIENT
Start: 2024-03-23 | End: 2024-03-23

## 2024-03-23 RX ORDER — SODIUM CHLORIDE 0.9 % (FLUSH) 0.9 %
10 SYRINGE (ML) INJECTION AS NEEDED
Status: DISCONTINUED | OUTPATIENT
Start: 2024-03-23 | End: 2024-03-23 | Stop reason: HOSPADM

## 2024-03-23 RX ADMIN — IPRATROPIUM BROMIDE AND ALBUTEROL SULFATE 3 ML: .5; 3 SOLUTION RESPIRATORY (INHALATION) at 08:39

## 2024-03-23 NOTE — DISCHARGE INSTRUCTIONS
Continue taking your antibiotic.  Follow-up with your primary care doctor if symptoms do not improve in the next few days.  Return to the emergency department for worsening symptoms, trouble breathing, fever, or other concern.

## 2024-03-23 NOTE — ED PROVIDER NOTES
EMERGENCY DEPARTMENT ENCOUNTER    Room Number:  13/13  PCP: Javan Martinez MD  Historian: Patient, spouse      HPI:  Chief Complaint: Shortness of breath, fever, chest pain  A complete HPI/ROS/PMH/PSH/SH/FH are unobtainable due to: Nothing  Context: Paz Browne is a 68 y.o. female with a medical history of COPD, A-fib, hypertension, hyperlipidemia, and chronic hypoxic respiratory failure who presents to the ED c/o acute shortness of breath, fever, and chest pain.  Patient has chronic dyspnea and is on 2 L of oxygen at all times.  She reports increased shortness of breath, wheezing, and nonproductive cough for the past few days.  Shortness of breath is worse with exertion. She also reports fever with a Tmax of 103 for the past 4 to 5 days.  She saw her PCP several days ago and was diagnosed with sinusitis and started on doxycycline.  She complains of sinus congestion and sinus pain but denies sore throat or sinus drainage.  She also complains of intermittent sharp mid chest pain.  Pain is worse with coughing.  Pain does not radiate.  Pain is not related to exertion.  Also complains of nausea.  Denies sore throat, vomiting, diarrhea, constipation, dysuria, or hematuria.  She was admitted here earlier this month for a COPD exacerbation.  Patient is on Eliquis.            PAST MEDICAL HISTORY  Active Ambulatory Problems     Diagnosis Date Noted    PAF (paroxysmal atrial fibrillation) 01/25/2016    Hypertension     Hyperlipidemia     Pain medication agreement 05/04/2016    Hiatal hernia 05/04/2016    Primary osteoarthritis involving multiple joints 05/04/2016    Pulmonary hypertension     S/P TVR (tricuspid valve repair) 07/07/2016    Pulmonary nodule 10/13/2016    Restless leg syndrome 11/18/2016    Chronic low back pain 08/02/2017    Dysplastic polyp of colon 08/07/2017    History of mitral valve replacement with tissue graft 08/11/2017    Chronic hypoxemic respiratory failure 08/13/2017    Anemia 08/09/2017     Celiac artery stenosis 08/24/2017    Intertrigo 08/25/2017    Abnormal EKG 06/30/2019    Muscle spasms of both lower extremities 12/22/2020    Iron deficiency anemia 03/30/2021    Adenomatous polyp of colon 03/30/2021    Gastroesophageal reflux disease without esophagitis 03/30/2021    Headache 07/04/2021    History of endocarditis 02/03/2022    Pneumonia of both lower lobes due to infectious organism 10/16/2022    Peptic ulcer disease 10/17/2022    Peripheral vascular disease 10/17/2022    Hyperlipidemia 10/17/2022    Hyperglycemia 10/17/2022    COPD with acute exacerbation 10/19/2022    Chronic diastolic CHF (congestive heart failure) 03/29/2023    Chronic bilateral low back pain 04/19/2023    COPD exacerbation 11/17/2023    Leukocytosis 03/02/2024    Elevated troponin 03/02/2024     Resolved Ambulatory Problems     Diagnosis Date Noted    Tachycardia     Renal failure     Palpitations     Mitral regurgitation     Heart failure 06/08/2016    Long term current use of anticoagulant 10/13/2016    Aspiration pneumonia 10/14/2016    Hemoptysis 10/14/2016    Lower GI bleed 08/09/2017    Precordial pain 08/11/2017    Oral candidiasis 08/14/2017    VAN (acute kidney injury) 08/15/2017    GI bleed 12/01/2017    Acute exacerbation of chronic obstructive pulmonary disease (COPD) 01/02/2018    Pneumonia due to infectious organism 07/18/2018    Insomnia due to medical condition 02/01/2019    COPD (chronic obstructive pulmonary disease) 06/30/2019    Shingles 04/08/2020    Vertigo 10/06/2020    Dark stools 03/30/2021    Acute hyperkalemia 05/10/2021    Tremor 05/10/2021    Anemia 05/10/2021    COPD exacerbation 06/14/2021    Septicemia 07/02/2021    Bacteremia 07/03/2021    Acute hypoxemic respiratory failure 10/17/2022    COPD (chronic obstructive pulmonary disease) 10/17/2022    Pulmonary nodule 10/17/2022    Hypokalemia 10/17/2022    Chronic respiratory failure 10/19/2022     Past Medical History:   Diagnosis Date     Asthma     Atrial flutter     Cataract     Chronic respiratory failure with hypoxia     Colon polyp     History of CHF (congestive heart failure)     History of home oxygen therapy     Infectious viral hepatitis     Long term (current) use of anticoagulants     Pneumonia          PAST SURGICAL HISTORY  Past Surgical History:   Procedure Laterality Date    CARDIAC CATHETERIZATION  09/01/2014    Right dominant systemt, normal coronary arteries.     CARDIAC CATHETERIZATION Left 6/10/2016    Procedure: Cardiac catheterization;  Surgeon: Sergei Hall MD;  Location: [facility] CATH INVASIVE LOCATION;  Service:     CARDIAC CATHETERIZATION N/A 6/10/2016    Procedure: Right Heart Cath;  Surgeon: Sergei Hall MD;  Location: [facility] CATH INVASIVE LOCATION;  Service:     CATARACT EXTRACTION      COLONOSCOPY      COLONOSCOPY N/A 8/4/2017    Procedure: COLONOSCOPY TO CECUM/TI WITH POLYPECTOMY ( COLD BX);  Surgeon: Cleveland Devine MD;  Location: [facility] ENDOSCOPY;  Service:     COLONOSCOPY N/A 8/10/2017    Procedure: COLONOSCOPY to cecum and TI with 2 clips placed at transverse;  Surgeon: Earnest PALOMO MD;  Location: [facility] ENDOSCOPY;  Service:     COLONOSCOPY N/A 12/22/2017    Procedure: COLONOSCOPY INTO CECUM WITH COLD POLYPECTOMIES;  Surgeon: Cleveland Devine MD;  Location: [facility] ENDOSCOPY;  Service:     COLONOSCOPY N/A 5/17/2021    Procedure: COLONOSCOPY to cecum;  Surgeon: Cleveland Devine MD;  Location: [facility] ENDOSCOPY;  Service: Gastroenterology;  Laterality: N/A;  Pre: Fe deficency anemia, h/x of polyps  Post: fair prep, normal    ENDOSCOPY N/A 8/17/2017    Procedure: ESOPHAGOGASTRODUODENOSCOPY;  Surgeon: Porsha Ruby MD;  Location: [facility] ENDOSCOPY;  Service:     ENDOSCOPY N/A 12/22/2017    Procedure: ESOPHAGOGASTRODUODENOSCOPY WITH BIOPSIES;  Surgeon: Cleveland Devine MD;  Location: [facility] ENDOSCOPY;  Service:     ENDOSCOPY N/A 5/17/2021    Procedure: ESOPHAGOGASTRODUODENOSCOPY  with biopsies;  Surgeon: Cleveland Devine MD;   Location: [facility] ENDOSCOPY;  Service: Gastroenterology;  Laterality: N/A;  Pre: Fe deficency anemia, nausea, heme positive stool   Post: gastritis, sloughing of distal esophagus mucosa    GALLBLADDER SURGERY      HEMORRHOIDECTOMY      HYSTERECTOMY      KIDNEY SURGERY  2013    Stent placement    MAZE PROCEDURE      MITRAL VALVE REPLACEMENT      TONSILLECTOMY      TRICUSPID VALVE REPLACEMENT           FAMILY HISTORY  Family History   Adopted: Yes   Problem Relation Age of Onset    No Known Problems Mother     No Known Problems Father          SOCIAL HISTORY  Social History     Socioeconomic History    Marital status:     Number of children: 2   Tobacco Use    Smoking status: Former     Current packs/day: 0.00     Average packs/day: 3.0 packs/day for 54.0 years (162.0 ttl pk-yrs)     Types: Cigarettes     Start date:      Quit date:      Years since quittin.2     Passive exposure: Past    Smokeless tobacco: Never    Tobacco comments:     caffeine - tea or mt dew    Vaping Use    Vaping status: Never Used   Substance and Sexual Activity    Alcohol use: No    Drug use: No    Sexual activity: Defer         ALLERGIES  Bupropion, Cephalexin, and Metoprolol    REVIEW OF SYSTEMS  Review of Systems  Included in HPI  All systems reviewed and negative except for those discussed in HPI.      PHYSICAL EXAM  ED Triage Vitals   Temp Heart Rate Resp BP SpO2   24 0644 24 0644 24 0644 24 0655 24 0644   99 °F (37.2 °C) 87 23 136/80 92 %      Temp src Heart Rate Source Patient Position BP Location FiO2 (%)   -- -- -- -- --              Physical Exam      GENERAL: Awake, alert, oriented x 3.  Nontoxic appearing elderly female.  Appears older than stated age.  No acute distress  HENT: NCAT, nares patent, there is frontal and maxillary sinus tenderness, oropharynx is benign  EYES: no scleral icterus  CV: Irregularly irregular rhythm, normal rate  RESPIRATORY: normal effort, clear to  auscultation bilaterally, diminished breath sounds bilaterally  ABDOMEN: soft, nondistended, nontender  MUSCULOSKELETAL: Extremities are nontender with full range of motion.  No calf tenderness.  No pedal edema  NEURO: Speech is normal.  No facial droop.  Follows commands  PSYCH:  calm, cooperative  SKIN: warm, dry    Vital signs and nursing notes reviewed.          LAB RESULTS  Recent Results (from the past 24 hour(s))   ECG 12 Lead ED Triage Standing Order; SOA    Collection Time: 03/23/24  6:48 AM   Result Value Ref Range    QT Interval 376 ms    QTC Interval 490 ms   Comprehensive Metabolic Panel    Collection Time: 03/23/24  7:27 AM    Specimen: Blood   Result Value Ref Range    Glucose 142 (H) 65 - 99 mg/dL    BUN 15 8 - 23 mg/dL    Creatinine 0.97 0.57 - 1.00 mg/dL    Sodium 137 136 - 145 mmol/L    Potassium 3.9 3.5 - 5.2 mmol/L    Chloride 98 98 - 107 mmol/L    CO2 26.6 22.0 - 29.0 mmol/L    Calcium 9.0 8.6 - 10.5 mg/dL    Total Protein 6.5 6.0 - 8.5 g/dL    Albumin 3.7 3.5 - 5.2 g/dL    ALT (SGPT) 26 1 - 33 U/L    AST (SGOT) 32 1 - 32 U/L    Alkaline Phosphatase 74 39 - 117 U/L    Total Bilirubin 0.7 0.0 - 1.2 mg/dL    Globulin 2.8 gm/dL    A/G Ratio 1.3 g/dL    BUN/Creatinine Ratio 15.5 7.0 - 25.0    Anion Gap 12.4 5.0 - 15.0 mmol/L    eGFR 63.0 >60.0 mL/min/1.73   BNP    Collection Time: 03/23/24  7:27 AM    Specimen: Blood   Result Value Ref Range    proBNP 1,033.0 (H) 0.0 - 900.0 pg/mL   Single High Sensitivity Troponin T    Collection Time: 03/23/24  7:27 AM    Specimen: Blood   Result Value Ref Range    HS Troponin T 25 (H) <14 ng/L   Green Top (Gel)    Collection Time: 03/23/24  7:27 AM   Result Value Ref Range    Extra Tube Hold for add-ons.    Lavender Top    Collection Time: 03/23/24  7:27 AM   Result Value Ref Range    Extra Tube hold for add-on    Light Blue Top    Collection Time: 03/23/24  7:27 AM   Result Value Ref Range    Extra Tube Hold for add-ons.    CBC Auto Differential    Collection  Time: 03/23/24  7:27 AM    Specimen: Blood   Result Value Ref Range    WBC 7.10 3.40 - 10.80 10*3/mm3    RBC 3.39 (L) 3.77 - 5.28 10*6/mm3    Hemoglobin 9.4 (L) 12.0 - 15.9 g/dL    Hematocrit 28.5 (L) 34.0 - 46.6 %    MCV 84.1 79.0 - 97.0 fL    MCH 27.7 26.6 - 33.0 pg    MCHC 33.0 31.5 - 35.7 g/dL    RDW 13.7 12.3 - 15.4 %    RDW-SD 42.0 37.0 - 54.0 fl    MPV 10.6 6.0 - 12.0 fL    Platelets 109 (L) 140 - 450 10*3/mm3    Neutrophil % 92.1 (H) 42.7 - 76.0 %    Lymphocyte % 3.4 (L) 19.6 - 45.3 %    Monocyte % 3.4 (L) 5.0 - 12.0 %    Eosinophil % 0.1 (L) 0.3 - 6.2 %    Basophil % 0.3 0.0 - 1.5 %    Neutrophils, Absolute 6.54 1.70 - 7.00 10*3/mm3    Lymphocytes, Absolute 0.24 (L) 0.70 - 3.10 10*3/mm3    Monocytes, Absolute 0.24 0.10 - 0.90 10*3/mm3    Eosinophils, Absolute 0.01 0.00 - 0.40 10*3/mm3    Basophils, Absolute 0.02 0.00 - 0.20 10*3/mm3   Procalcitonin    Collection Time: 03/23/24  7:27 AM    Specimen: Blood   Result Value Ref Range    Procalcitonin 0.39 (H) 0.00 - 0.25 ng/mL   Respiratory Panel PCR w/COVID-19(SARS-CoV-2) KAJAL/MARILIN/DEBBIE/PAD/COR/DAVID In-House, NP Swab in [facility], 2 HR TAT - Swab, Nasopharynx    Collection Time: 03/23/24  7:27 AM    Specimen: Nasopharynx; Swab   Result Value Ref Range    ADENOVIRUS, PCR Not Detected Not Detected    Coronavirus 229E Not Detected Not Detected    Coronavirus HKU1 Not Detected Not Detected    Coronavirus NL63 Not Detected Not Detected    Coronavirus OC43 Not Detected Not Detected    COVID19 Not Detected Not Detected - Ref. Range    Human Metapneumovirus Not Detected Not Detected    Human Rhinovirus/Enterovirus Not Detected Not Detected    Influenza A PCR Not Detected Not Detected    Influenza B PCR Not Detected Not Detected    Parainfluenza Virus 1 Not Detected Not Detected    Parainfluenza Virus 2 Not Detected Not Detected    Parainfluenza Virus 3 Not Detected Not Detected    Parainfluenza Virus 4 Not Detected Not Detected    RSV, PCR Not Detected Not Detected     Bordetella pertussis pcr Not Detected Not Detected    Bordetella parapertussis PCR Not Detected Not Detected    Chlamydophila pneumoniae PCR Not Detected Not Detected    Mycoplasma pneumo by PCR Not Detected Not Detected   Lactic Acid, Plasma    Collection Time: 03/23/24  7:27 AM    Specimen: Blood   Result Value Ref Range    Lactate 1.9 0.5 - 2.0 mmol/L       Ordered the above labs and reviewed the results.        RADIOLOGY  XR Chest 1 View    Result Date: 3/23/2024  XR CHEST 1 VW-  HISTORY: Female who is 68 years-old, short of breath  TECHNIQUE: Frontal view of the chest  COMPARISON: 3/2/2024  FINDINGS: The heart size is borderline. Sternotomy wires are present. Pulmonary vasculature is unremarkable. Aorta is calcified. No focal pulmonary consolidation, pleural effusion, or pneumothorax. No acute osseous process.      No focal pulmonary consolidation. Borderline heart size. Follow-up as clinical indications persist.  This report was finalized on 3/23/2024 7:33 AM by Dr. Chapincito Garcia M.D on Workstation: [facility]       Ordered the above noted radiological studies. Reviewed by me in PACS.            PROCEDURES  Procedures        OUTPATIENT MEDICATION MANAGEMENT:  Current Facility-Administered Medications Ordered in Epic   Medication Dose Route Frequency Provider Last Rate Last Admin    sodium chloride 0.9 % flush 10 mL  10 mL Intravenous PRN Emergency, Triage Protocol, MD         Current Outpatient Medications Ordered in Epic   Medication Sig Dispense Refill    albuterol sulfate  (90 Base) MCG/ACT inhaler Inhale 2 puffs Every 4 (Four) Hours As Needed for Wheezing. 18 g 3    amLODIPine (NORVASC) 10 MG tablet TAKE 1 TABLET BY MOUTH EVERY DAY 90 tablet 1    apixaban (ELIQUIS) 5 MG tablet tablet Take 1 tablet by mouth Every 12 (Twelve) Hours. Indications: Atrial Fibrillation 180 tablet 3    atorvastatin (LIPITOR) 40 MG tablet TAKE 1 TABLET BY MOUTH EVERY DAY 90 tablet 2    budesonide (PULMICORT) 0.5 MG/2ML  nebulizer solution Take 2 mL by nebulization 2 (Two) Times a Day for 30 days. Indications: Chronic Obstructive Lung Disease, Chronic hypoxic respiratory failure 360 mL 3    carvedilol (COREG) 6.25 MG tablet TAKE 1 TABLET BY MOUTH TWICE A DAY WITH MEALS 180 tablet 2    cyclobenzaprine (FLEXERIL) 10 MG tablet TAKE 1 TABLET BY MOUTH EVERYDAY AT BEDTIME 90 tablet 3    docosanol (ABREVA) 10 % cream cream Apply 1 Application topically to the appropriate area as directed 5 (Five) Times a Day. 2 g 0    furosemide (LASIX) 40 MG tablet TAKE 1 TABLET BY MOUTH TWICE A  tablet 1    HYDROcodone-acetaminophen (NORCO) 7.5-325 MG per tablet Take 1 tablet by mouth Every 6 (Six) Hours As Needed for Moderate Pain (Chronic pain medicine for low back pain). 90 tablet 0    ipratropium-albuterol (DUO-NEB) 0.5-2.5 mg/3 ml nebulizer Take 3 mL by nebulization Every 4 (Four) Hours As Needed for Wheezing. 360 mL 3    lisinopril (PRINIVIL,ZESTRIL) 40 MG tablet Take 1 tablet by mouth Daily.      loratadine (CLARITIN) 10 MG tablet Take 1 tablet by mouth 2 (two) times a day.      Magnesium Oxide 400 (240 Mg) MG tablet TAKE 1 TABLET BY MOUTH EVERY DAY 90 tablet 1    Multiple Vitamins-Minerals (MULTIVITAL) tablet Take 1 tablet by mouth Daily.      Nebulizer device 1 Units 4 (Four) Times a Day As Needed (Wheezing). J44.1 j20.8 1 each 0    O2 (OXYGEN) Inhale 2 L/min Continuous.      Omega-3 Fatty Acids (fish oil) 1000 MG capsule capsule Take  by mouth Every Night.      omeprazole (priLOSEC) 40 MG capsule TAKE 1 CAPSULE BY MOUTH EVERY DAY 90 capsule 1    ondansetron ODT (ZOFRAN-ODT) 4 MG disintegrating tablet Place 1 tablet on the tongue Every 8 (Eight) Hours As Needed for Nausea or Vomiting. 10 tablet 0    polyethylene glycol (MIRALAX) packet Take 17 g by mouth 2 (Two) Times a Day.      potassium chloride (K-DUR,KLOR-CON) 20 MEQ CR tablet 1 po tid 60 tablet 0    roflumilast (DALIRESP) 500 MCG tablet tablet Take 1 tablet by mouth Daily. 90 tablet  1    rOPINIRole (REQUIP) 2 MG tablet TAKE 1 TABLET BY MOUTH EVERY DAY AT NIGHT 90 tablet 2    sertraline (ZOLOFT) 100 MG tablet TAKE 1 TABLET BY MOUTH EVERY DAY 90 tablet 1    zolpidem (AMBIEN) 10 MG tablet Take 1 tablet by mouth At Night As Needed for Sleep. 30 tablet 3           MEDICATIONS GIVEN IN ER  Medications   sodium chloride 0.9 % flush 10 mL (has no administration in time range)   ipratropium-albuterol (DUO-NEB) nebulizer solution 3 mL (3 mL Nebulization Given 3/23/24 0839)                   MEDICAL DECISION MAKING, PROGRESS, and CONSULTS    All labs have been independently reviewed by me.  All radiology studies have been reviewed by me and I have also reviewed the radiology report.   EKG's independently viewed and interpreted by me.  Discussion below represents my analysis of pertinent findings related to patient's condition, differential diagnosis, treatment plan and final disposition.      Additional sources:    - Discussed/ obtained information from independent historians: Spouse at bedside    - External (non-ED) record review: Patient was admitted here 3/2 through 3/6/2024 for COPD exacerbation.  She was seen by cardiology, pulmonology, and infectious disease.  She was not felt to have any acute infection.  Echocardiogram showed vegetation on the mitral valve which was felt to be a calcified remnant from prior endocarditis.  She was started on apixaban and taken off of aspirin.Cardiac cath done in June 2016 showed moderate pulmonary hypertension and mild CAD.    -Prescription drug monitoring program review:     N/A    - Chronic or social conditions impacting patient care (Social Determinants of Health): None          Orders placed during this visit:  Orders Placed This Encounter   Procedures    Respiratory Panel PCR w/COVID-19(SARS-CoV-2) KAJAL/MARILIN/DEBBIE/PAD/COR/DAVID In-House, NP Swab in UTM/VTM, 2 HR TAT - Swab, Nasopharynx    XR Chest 1 View    [place] Draw    Comprehensive Metabolic Panel    BNP     Single High Sensitivity Troponin T    CBC Auto Differential    Procalcitonin    Lactic Acid, Plasma    NPO Diet NPO Type: Strict NPO    Undress & Gown    Continuous Pulse Oximetry    Vital Signs    Oxygen Therapy- Nasal Cannula; Titrate 1-6 LPM Per SpO2; 90 - 95%    ECG 12 Lead ED Triage Standing Order; SOA    Insert Peripheral IV    CBC & Differential    Green Top (Gel)    Lavender Top    Gold Top - SST    Light Blue Top         Additional orders considered but not ordered:          Differential diagnosis includes, but is not limited to:    Differential diagnosis includes but is not limited to CHF, acute coronary syndrome, pulmonary embolism, pneumothorax, pneumonia, asthma/COPD, deconditioning, anemia, anxiety.         Independent interpretation of labs, radiology studies, and discussions with consultants:  ED Course as of 03/23/24 0900   Sat Mar 23, 2024   0714 EKG personally interpreted by me at 6:50 AM.  My personal interpretation is:          EKG time: 6:48 AM  Rhythm/Rate: Atrial fibrillation, rate 102  P waves and DE: Irregular, varying  QRS, axis: Normal  ST and T waves: Nonspecific ST/T wave changes in the lateral and inferior leads    Interpreted Contemporaneously by me, independently viewed  EKG is not significantly changed compared to prior EKG done on 3/6/2024   [WH]   0746 Chest x-ray personally interpreted by me.  My personal interpretation is: Heart size is borderline.  Lungs are clear.  No pleural effusion. [WH]   0753 WBC: 7.10 [WH]   0753 Hemoglobin(!): 9.4  11.1 two weeks ago [WH]   0753 Platelets(!): 109  308 two weeks ago [WH]   0754 Lactate: 1.9 [WH]   0807 Procalcitonin(!): 0.39  0.06 three weeks ago [WH]   0807 HS Troponin T(!): 25  Stable [WH]   0807 proBNP(!): 1,033.0  892 three weeks ago [WH]   0836 Respiratory panel is negative [WH]   0855 Test results discussed with the patient and her .  She is resting and breathing comfortably.  Oxygen saturation is 100% on 2 L.  Vital signs  are stable.  Patient was advised to continue taking her antibiotic for sinusitis.  Return precautions were discussed. [WH]   0858 MDM: Patient presented to ED complaining of fever, chest pain, sinus congestion, and shortness of breath.  Chest x-ray was negative acute.  Respiratory panel was negative.  Labs were basically unremarkable except for mildly elevated procalcitonin.  Troponin was also mildly elevated but stable compared to recent admission.  Patient was breathing comfortably.  O2 sats were in the upper 90s on 2 L, which the patient is on chronically.  Her chest pain was atypical.  EKG was unchanged.  She is compliant with Eliquis, so have low suspicion for PE.  Symptoms are consistent with sinusitis.  She is already on antibiotics. [WH]      ED Course User Index  [WH] Yg Bolton MD         COMPLEXITY OF CARE  Admission was considered but after careful review of the patient's presentation, physical examination, diagnostic results, and response to treatment the patient may be safely discharged with outpatient follow-up.      DIAGNOSIS  Final diagnoses:   Acute sinusitis, recurrence not specified, unspecified location   Chronic respiratory failure with hypoxia   Atypical chest pain   Anticoagulated         DISPOSITION  DISCHARGE    Patient discharged in stable condition.    Reviewed implications of results, diagnosis, meds, responsibility to follow up, warning signs and symptoms of possible worsening, potential complications and reasons to return to ER, including worsening/persistent symptoms, fever, trouble breathing, or other concern.    Patient/Family voiced understanding of above instructions.    Discussed plan for discharge, as there is no emergent indication for admission. Patient referred to primary care provider for BP management due to today's BP. Pt/family is agreeable and understands need for follow up and repeat testing.  Pt is aware that discharge does not mean that nothing is wrong but  it indicates no emergency is present that requires admission and they must continue care with follow-up as given below or physician of their choice.     FOLLOW-UP  Javan Martinez MD  [address] [facility]  [address] [place], [place] [id number] [phone]    Schedule an appointment as soon as possible for a visit            Medication List      No changes were made to your prescriptions during this visit.                   Latest Documented Vital Signs:  AS OF 09:00 EDT VITALS:    BP - 140/77  HR - 77  TEMP - 99 °F (37.2 °C)  O2 SATS - 96%            --    Please note that portions of this were completed with a voice recognition program.       Note Disclaimer: At [facility], we believe that sharing information builds trust and better relationships. You are receiving this note because you are receiving care at [facility] or recently visited. It is possible you will see health information before a provider has talked with you about it. This kind of information can be easy to misunderstand. To help you fully understand what it means for your health, we urge you to discuss this note with your provider.             Yg Bolton MD  03/23/24 0900

## 2024-03-25 LAB
QT INTERVAL: 376 MS
QTC INTERVAL: 490 MS

## 2024-03-28 ENCOUNTER — READMISSION MANAGEMENT (OUTPATIENT)
Dept: CALL CENTER | Facility: HOSPITAL | Age: 69
End: 2024-03-28
Payer: MEDICARE

## 2024-03-28 NOTE — OUTREACH NOTE
COPD/PN Week 3 Survey      Flowsheet Row Responses   [facility] patient discharged from? [place]   Does the patient have one of the following disease processes/diagnoses(primary or secondary)? COPD   Week 3 attempt successful? Yes   Call start time 1127   Call end time 1128   Discharge diagnosis COPD exacerbation   Is patient permission given to speak with other caregiver? Yes   List who call center can speak with spouse- [place]   Person spoke with today (if not patient) and relationship spouse   Is the patient taking all medications as directed (includes completed medication regime)? Yes   Does the patient have a primary care provider?  Yes   Comments regarding PCP spouse states patient has a f/u appt with PCP scheduled   Has the patient kept scheduled appointments due by today? N/A   Has home health visited the patient within 72 hours of discharge? N/A   Psychosocial issues? No   What is the patient's perception of their health status since discharge? Improving   Is the patient/caregiver able to teach back the hierarchy of who to call/visit for symptoms/problems? PCP, Specialist, Home health nurse, Urgent Care, ED, 911 Yes   Patient reports what zone on this call? Green Zone   Green Zone Reports doing well, Breathing without shortness of breath   Green Zone interventions: Take daily medications, Use oxygen as prescribed   Week 3 call completed? Yes   Graduated Yes   Is the patient interested in additional calls from an ambulatory ? No   Would this patient benefit from a Referral to Amb Social Work? No   Wrap up additional comments Brief call with spouse, states patient is doing well, no questions.   Call end time 1128            Berenice MCFARLAND - Vanessa Nurse

## 2024-03-31 ENCOUNTER — APPOINTMENT (OUTPATIENT)
Dept: GENERAL RADIOLOGY | Facility: HOSPITAL | Age: 69
DRG: 314 | End: 2024-03-31
Payer: MEDICARE

## 2024-03-31 ENCOUNTER — HOSPITAL ENCOUNTER (INPATIENT)
Facility: HOSPITAL | Age: 69
LOS: 4 days | Discharge: HOME | DRG: 314 | End: 2024-04-06
Attending: EMERGENCY MEDICINE | Admitting: HOSPITALIST
Payer: MEDICARE

## 2024-03-31 DIAGNOSIS — A41.81 SEPTICEMIA DUE TO ENTEROCOCCUS: ICD-10-CM

## 2024-03-31 DIAGNOSIS — J18.9 PNEUMONIA DUE TO INFECTIOUS ORGANISM, UNSPECIFIED LATERALITY, UNSPECIFIED PART OF LUNG: Primary | ICD-10-CM

## 2024-03-31 LAB
ALBUMIN SERPL-MCNC: 3.6 G/DL (ref 3.5–5.2)
ALBUMIN/GLOB SERPL: 1.2 G/DL
ALP SERPL-CCNC: 94 U/L (ref 39–117)
ALT SERPL W P-5'-P-CCNC: 20 U/L (ref 1–33)
ANION GAP SERPL CALCULATED.3IONS-SCNC: 10 MMOL/L (ref 5–15)
AST SERPL-CCNC: 19 U/L (ref 1–32)
B PARAPERT DNA SPEC QL NAA+PROBE: NOT DETECTED
B PERT DNA SPEC QL NAA+PROBE: NOT DETECTED
BASOPHILS # BLD AUTO: 0.03 10*3/MM3 (ref 0–0.2)
BASOPHILS NFR BLD AUTO: 0.2 % (ref 0–1.5)
BILIRUB SERPL-MCNC: 0.6 MG/DL (ref 0–1.2)
BUN SERPL-MCNC: 9 MG/DL (ref 8–23)
BUN/CREAT SERPL: 11.8 (ref 7–25)
C PNEUM DNA NPH QL NAA+NON-PROBE: NOT DETECTED
CALCIUM SPEC-SCNC: 9.7 MG/DL (ref 8.6–10.5)
CHLORIDE SERPL-SCNC: 101 MMOL/L (ref 98–107)
CO2 SERPL-SCNC: 30 MMOL/L (ref 22–29)
CREAT SERPL-MCNC: 0.76 MG/DL (ref 0.57–1)
D-LACTATE SERPL-SCNC: 0.8 MMOL/L (ref 0.5–2)
DEPRECATED RDW RBC AUTO: 44.9 FL (ref 37–54)
EGFRCR SERPLBLD CKD-EPI 2021: 84.4 ML/MIN/1.73
EOSINOPHIL # BLD AUTO: 0.11 10*3/MM3 (ref 0–0.4)
EOSINOPHIL NFR BLD AUTO: 0.9 % (ref 0.3–6.2)
ERYTHROCYTE [DISTWIDTH] IN BLOOD BY AUTOMATED COUNT: 14.5 % (ref 12.3–15.4)
FLUAV SUBTYP SPEC NAA+PROBE: NOT DETECTED
FLUBV RNA ISLT QL NAA+PROBE: NOT DETECTED
GLOBULIN UR ELPH-MCNC: 3 GM/DL
GLUCOSE SERPL-MCNC: 123 MG/DL (ref 65–99)
HADV DNA SPEC NAA+PROBE: NOT DETECTED
HCOV 229E RNA SPEC QL NAA+PROBE: NOT DETECTED
HCOV HKU1 RNA SPEC QL NAA+PROBE: NOT DETECTED
HCOV NL63 RNA SPEC QL NAA+PROBE: NOT DETECTED
HCOV OC43 RNA SPEC QL NAA+PROBE: NOT DETECTED
HCT VFR BLD AUTO: 29.4 % (ref 34–46.6)
HGB BLD-MCNC: 9.4 G/DL (ref 12–15.9)
HMPV RNA NPH QL NAA+NON-PROBE: NOT DETECTED
HOLD SPECIMEN: NORMAL
HOLD SPECIMEN: NORMAL
HPIV1 RNA ISLT QL NAA+PROBE: NOT DETECTED
HPIV2 RNA SPEC QL NAA+PROBE: NOT DETECTED
HPIV3 RNA NPH QL NAA+PROBE: NOT DETECTED
HPIV4 P GENE NPH QL NAA+PROBE: NOT DETECTED
IMM GRANULOCYTES # BLD AUTO: 0.11 10*3/MM3 (ref 0–0.05)
IMM GRANULOCYTES NFR BLD AUTO: 0.9 % (ref 0–0.5)
LYMPHOCYTES # BLD AUTO: 1.01 10*3/MM3 (ref 0.7–3.1)
LYMPHOCYTES NFR BLD AUTO: 7.9 % (ref 19.6–45.3)
M PNEUMO IGG SER IA-ACNC: NOT DETECTED
MCH RBC QN AUTO: 27.4 PG (ref 26.6–33)
MCHC RBC AUTO-ENTMCNC: 32 G/DL (ref 31.5–35.7)
MCV RBC AUTO: 85.7 FL (ref 79–97)
MONOCYTES # BLD AUTO: 0.55 10*3/MM3 (ref 0.1–0.9)
MONOCYTES NFR BLD AUTO: 4.3 % (ref 5–12)
NEUTROPHILS NFR BLD AUTO: 10.96 10*3/MM3 (ref 1.7–7)
NEUTROPHILS NFR BLD AUTO: 85.8 % (ref 42.7–76)
NRBC BLD AUTO-RTO: 0 /100 WBC (ref 0–0.2)
NT-PROBNP SERPL-MCNC: 2009 PG/ML (ref 0–900)
PLATELET # BLD AUTO: 268 10*3/MM3 (ref 140–450)
PMV BLD AUTO: 10 FL (ref 6–12)
POTASSIUM SERPL-SCNC: 4.1 MMOL/L (ref 3.5–5.2)
PROCALCITONIN SERPL-MCNC: 0.1 NG/ML (ref 0–0.25)
PROT SERPL-MCNC: 6.6 G/DL (ref 6–8.5)
RBC # BLD AUTO: 3.43 10*6/MM3 (ref 3.77–5.28)
RHINOVIRUS RNA SPEC NAA+PROBE: NOT DETECTED
RSV RNA NPH QL NAA+NON-PROBE: NOT DETECTED
SARS-COV-2 RNA NPH QL NAA+NON-PROBE: NOT DETECTED
SODIUM SERPL-SCNC: 141 MMOL/L (ref 136–145)
TROPONIN T SERPL HS-MCNC: 23 NG/L
WBC NRBC COR # BLD AUTO: 12.77 10*3/MM3 (ref 3.4–10.8)
WHOLE BLOOD HOLD COAG: NORMAL
WHOLE BLOOD HOLD SPECIMEN: NORMAL

## 2024-03-31 PROCEDURE — 87154 CUL TYP ID BLD PTHGN 6+ TRGT: CPT | Performed by: EMERGENCY MEDICINE

## 2024-03-31 PROCEDURE — 87077 CULTURE AEROBIC IDENTIFY: CPT | Performed by: EMERGENCY MEDICINE

## 2024-03-31 PROCEDURE — 36415 COLL VENOUS BLD VENIPUNCTURE: CPT

## 2024-03-31 PROCEDURE — 83880 ASSAY OF NATRIURETIC PEPTIDE: CPT

## 2024-03-31 PROCEDURE — 84484 ASSAY OF TROPONIN QUANT: CPT

## 2024-03-31 PROCEDURE — 85025 COMPLETE CBC W/AUTO DIFF WBC: CPT

## 2024-03-31 PROCEDURE — 99285 EMERGENCY DEPT VISIT HI MDM: CPT

## 2024-03-31 PROCEDURE — 0202U NFCT DS 22 TRGT SARS-COV-2: CPT | Performed by: EMERGENCY MEDICINE

## 2024-03-31 PROCEDURE — 87040 BLOOD CULTURE FOR BACTERIA: CPT | Performed by: EMERGENCY MEDICINE

## 2024-03-31 PROCEDURE — 93005 ELECTROCARDIOGRAM TRACING: CPT | Performed by: EMERGENCY MEDICINE

## 2024-03-31 PROCEDURE — 84145 PROCALCITONIN (PCT): CPT | Performed by: EMERGENCY MEDICINE

## 2024-03-31 PROCEDURE — 83605 ASSAY OF LACTIC ACID: CPT | Performed by: EMERGENCY MEDICINE

## 2024-03-31 PROCEDURE — 87186 SC STD MICRODIL/AGAR DIL: CPT | Performed by: EMERGENCY MEDICINE

## 2024-03-31 PROCEDURE — 71045 X-RAY EXAM CHEST 1 VIEW: CPT

## 2024-03-31 PROCEDURE — 80053 COMPREHEN METABOLIC PANEL: CPT

## 2024-03-31 PROCEDURE — 93010 ELECTROCARDIOGRAM REPORT: CPT | Performed by: INTERNAL MEDICINE

## 2024-03-31 RX ORDER — SODIUM CHLORIDE 0.9 % (FLUSH) 0.9 %
10 SYRINGE (ML) INJECTION AS NEEDED
Status: DISCONTINUED | OUTPATIENT
Start: 2024-03-31 | End: 2024-04-06 | Stop reason: HOSPADM

## 2024-04-01 ENCOUNTER — APPOINTMENT (OUTPATIENT)
Dept: GENERAL RADIOLOGY | Facility: HOSPITAL | Age: 69
DRG: 314 | End: 2024-04-01
Payer: MEDICARE

## 2024-04-01 PROBLEM — J96.20 ACUTE-ON-CHRONIC RESPIRATORY FAILURE: Status: ACTIVE | Noted: 2024-04-01

## 2024-04-01 PROBLEM — J18.9 PNEUMONIA: Status: ACTIVE | Noted: 2024-04-01

## 2024-04-01 LAB
ANION GAP SERPL CALCULATED.3IONS-SCNC: 8 MMOL/L (ref 5–15)
BACTERIA BLD CULT: ABNORMAL
BASOPHILS # BLD AUTO: 0.02 10*3/MM3 (ref 0–0.2)
BASOPHILS NFR BLD AUTO: 0.2 % (ref 0–1.5)
BOTTLE TYPE: ABNORMAL
BUN SERPL-MCNC: 6 MG/DL (ref 8–23)
BUN/CREAT SERPL: 8.8 (ref 7–25)
CALCIUM SPEC-SCNC: 8.9 MG/DL (ref 8.6–10.5)
CHLORIDE SERPL-SCNC: 101 MMOL/L (ref 98–107)
CO2 SERPL-SCNC: 30 MMOL/L (ref 22–29)
CREAT SERPL-MCNC: 0.68 MG/DL (ref 0.57–1)
DEPRECATED RDW RBC AUTO: 43.4 FL (ref 37–54)
EGFRCR SERPLBLD CKD-EPI 2021: 93.8 ML/MIN/1.73
EOSINOPHIL # BLD AUTO: 0.05 10*3/MM3 (ref 0–0.4)
EOSINOPHIL NFR BLD AUTO: 0.4 % (ref 0.3–6.2)
ERYTHROCYTE [DISTWIDTH] IN BLOOD BY AUTOMATED COUNT: 14.5 % (ref 12.3–15.4)
GLUCOSE SERPL-MCNC: 94 MG/DL (ref 65–99)
HCT VFR BLD AUTO: 25.8 % (ref 34–46.6)
HGB BLD-MCNC: 8.2 G/DL (ref 12–15.9)
IMM GRANULOCYTES # BLD AUTO: 0.11 10*3/MM3 (ref 0–0.05)
IMM GRANULOCYTES NFR BLD AUTO: 1 % (ref 0–0.5)
LYMPHOCYTES # BLD AUTO: 0.76 10*3/MM3 (ref 0.7–3.1)
LYMPHOCYTES NFR BLD AUTO: 6.8 % (ref 19.6–45.3)
MCH RBC QN AUTO: 26.5 PG (ref 26.6–33)
MCHC RBC AUTO-ENTMCNC: 31.8 G/DL (ref 31.5–35.7)
MCV RBC AUTO: 83.2 FL (ref 79–97)
MONOCYTES # BLD AUTO: 0.48 10*3/MM3 (ref 0.1–0.9)
MONOCYTES NFR BLD AUTO: 4.3 % (ref 5–12)
MRSA DNA SPEC QL NAA+PROBE: NORMAL
NEUTROPHILS NFR BLD AUTO: 87.3 % (ref 42.7–76)
NEUTROPHILS NFR BLD AUTO: 9.82 10*3/MM3 (ref 1.7–7)
NRBC BLD AUTO-RTO: 0 /100 WBC (ref 0–0.2)
PLATELET # BLD AUTO: 246 10*3/MM3 (ref 140–450)
PMV BLD AUTO: 10.4 FL (ref 6–12)
POTASSIUM SERPL-SCNC: 3.7 MMOL/L (ref 3.5–5.2)
RBC # BLD AUTO: 3.1 10*6/MM3 (ref 3.77–5.28)
SODIUM SERPL-SCNC: 139 MMOL/L (ref 136–145)
WBC NRBC COR # BLD AUTO: 11.24 10*3/MM3 (ref 3.4–10.8)

## 2024-04-01 PROCEDURE — 25010000002 CEFEPIME PER 500 MG: Performed by: INTERNAL MEDICINE

## 2024-04-01 PROCEDURE — 25010000002 CEFTRIAXONE PER 250 MG: Performed by: NURSE PRACTITIONER

## 2024-04-01 PROCEDURE — 94799 UNLISTED PULMONARY SVC/PX: CPT

## 2024-04-01 PROCEDURE — 25010000002 AMPICILLIN-SULBACTAM PER 1.5 G: Performed by: NURSE PRACTITIONER

## 2024-04-01 PROCEDURE — 25010000002 ONDANSETRON PER 1 MG: Performed by: NURSE PRACTITIONER

## 2024-04-01 PROCEDURE — 80048 BASIC METABOLIC PNL TOTAL CA: CPT | Performed by: NURSE PRACTITIONER

## 2024-04-01 PROCEDURE — 94761 N-INVAS EAR/PLS OXIMETRY MLT: CPT

## 2024-04-01 PROCEDURE — 97162 PT EVAL MOD COMPLEX 30 MIN: CPT

## 2024-04-01 PROCEDURE — G0378 HOSPITAL OBSERVATION PER HR: HCPCS

## 2024-04-01 PROCEDURE — 74220 X-RAY XM ESOPHAGUS 1CNTRST: CPT

## 2024-04-01 PROCEDURE — 94640 AIRWAY INHALATION TREATMENT: CPT

## 2024-04-01 PROCEDURE — 25010000002 LEVOFLOXACIN PER 250 MG: Performed by: STUDENT IN AN ORGANIZED HEALTH CARE EDUCATION/TRAINING PROGRAM

## 2024-04-01 PROCEDURE — 87641 MR-STAPH DNA AMP PROBE: CPT | Performed by: INTERNAL MEDICINE

## 2024-04-01 PROCEDURE — 85025 COMPLETE CBC W/AUTO DIFF WBC: CPT | Performed by: NURSE PRACTITIONER

## 2024-04-01 RX ORDER — CARVEDILOL 6.25 MG/1
6.25 TABLET ORAL 2 TIMES DAILY WITH MEALS
Status: DISCONTINUED | OUTPATIENT
Start: 2024-04-01 | End: 2024-04-06 | Stop reason: HOSPADM

## 2024-04-01 RX ORDER — BUDESONIDE 0.5 MG/2ML
0.5 INHALANT ORAL
Status: DISCONTINUED | OUTPATIENT
Start: 2024-04-01 | End: 2024-04-06 | Stop reason: HOSPADM

## 2024-04-01 RX ORDER — FUROSEMIDE 40 MG/1
40 TABLET ORAL
Status: DISCONTINUED | OUTPATIENT
Start: 2024-04-01 | End: 2024-04-06 | Stop reason: HOSPADM

## 2024-04-01 RX ORDER — ACETAMINOPHEN 325 MG/1
650 TABLET ORAL EVERY 4 HOURS PRN
Status: DISCONTINUED | OUTPATIENT
Start: 2024-04-01 | End: 2024-04-06 | Stop reason: HOSPADM

## 2024-04-01 RX ORDER — BISACODYL 10 MG
10 SUPPOSITORY, RECTAL RECTAL DAILY PRN
Status: DISCONTINUED | OUTPATIENT
Start: 2024-04-01 | End: 2024-04-06 | Stop reason: HOSPADM

## 2024-04-01 RX ORDER — IPRATROPIUM BROMIDE AND ALBUTEROL SULFATE 2.5; .5 MG/3ML; MG/3ML
3 SOLUTION RESPIRATORY (INHALATION)
Status: DISCONTINUED | OUTPATIENT
Start: 2024-04-01 | End: 2024-04-06 | Stop reason: HOSPADM

## 2024-04-01 RX ORDER — LISINOPRIL 40 MG/1
40 TABLET ORAL DAILY
Status: DISCONTINUED | OUTPATIENT
Start: 2024-04-01 | End: 2024-04-06 | Stop reason: HOSPADM

## 2024-04-01 RX ORDER — NITROGLYCERIN 0.4 MG/1
0.4 TABLET SUBLINGUAL
Status: DISCONTINUED | OUTPATIENT
Start: 2024-04-01 | End: 2024-04-06 | Stop reason: HOSPADM

## 2024-04-01 RX ORDER — POTASSIUM CHLORIDE 750 MG/1
20 TABLET, FILM COATED, EXTENDED RELEASE ORAL DAILY
Status: DISCONTINUED | OUTPATIENT
Start: 2024-04-01 | End: 2024-04-06 | Stop reason: HOSPADM

## 2024-04-01 RX ORDER — SODIUM CHLORIDE 0.9 % (FLUSH) 0.9 %
10 SYRINGE (ML) INJECTION AS NEEDED
Status: DISCONTINUED | OUTPATIENT
Start: 2024-04-01 | End: 2024-04-06 | Stop reason: HOSPADM

## 2024-04-01 RX ORDER — ALBUTEROL SULFATE 2.5 MG/3ML
2.5 SOLUTION RESPIRATORY (INHALATION)
Status: DISPENSED | OUTPATIENT
Start: 2024-04-01 | End: 2024-04-01

## 2024-04-01 RX ORDER — SODIUM CHLORIDE 0.9 % (FLUSH) 0.9 %
10 SYRINGE (ML) INJECTION EVERY 12 HOURS SCHEDULED
Status: DISCONTINUED | OUTPATIENT
Start: 2024-04-01 | End: 2024-04-06 | Stop reason: HOSPADM

## 2024-04-01 RX ORDER — HYDROCODONE BITARTRATE AND ACETAMINOPHEN 7.5; 325 MG/1; MG/1
1 TABLET ORAL EVERY 6 HOURS PRN
Status: DISPENSED | OUTPATIENT
Start: 2024-04-01 | End: 2024-04-06

## 2024-04-01 RX ORDER — SERTRALINE HYDROCHLORIDE 100 MG/1
100 TABLET, FILM COATED ORAL DAILY
Status: DISCONTINUED | OUTPATIENT
Start: 2024-04-01 | End: 2024-04-06 | Stop reason: HOSPADM

## 2024-04-01 RX ORDER — ATORVASTATIN CALCIUM 20 MG/1
40 TABLET, FILM COATED ORAL NIGHTLY
Status: DISCONTINUED | OUTPATIENT
Start: 2024-04-01 | End: 2024-04-06 | Stop reason: HOSPADM

## 2024-04-01 RX ORDER — ACETAMINOPHEN 160 MG/5ML
650 SOLUTION ORAL EVERY 4 HOURS PRN
Status: DISCONTINUED | OUTPATIENT
Start: 2024-04-01 | End: 2024-04-06 | Stop reason: HOSPADM

## 2024-04-01 RX ORDER — LEVOFLOXACIN 5 MG/ML
750 INJECTION, SOLUTION INTRAVENOUS ONCE
Status: COMPLETED | OUTPATIENT
Start: 2024-04-01 | End: 2024-04-01

## 2024-04-01 RX ORDER — LEVOFLOXACIN 5 MG/ML
750 INJECTION, SOLUTION INTRAVENOUS EVERY 24 HOURS
Status: DISCONTINUED | OUTPATIENT
Start: 2024-04-01 | End: 2024-04-01

## 2024-04-01 RX ORDER — POLYETHYLENE GLYCOL 3350 17 G/17G
17 POWDER, FOR SOLUTION ORAL 2 TIMES DAILY PRN
Status: DISCONTINUED | OUTPATIENT
Start: 2024-04-01 | End: 2024-04-06 | Stop reason: HOSPADM

## 2024-04-01 RX ORDER — ROFLUMILAST 500 UG/1
500 TABLET ORAL DAILY
Status: DISCONTINUED | OUTPATIENT
Start: 2024-04-01 | End: 2024-04-06 | Stop reason: HOSPADM

## 2024-04-01 RX ORDER — SODIUM CHLORIDE 9 MG/ML
40 INJECTION, SOLUTION INTRAVENOUS AS NEEDED
Status: DISCONTINUED | OUTPATIENT
Start: 2024-04-01 | End: 2024-04-06 | Stop reason: HOSPADM

## 2024-04-01 RX ORDER — LANOLIN ALCOHOL/MO/W.PET/CERES
1 CREAM (GRAM) TOPICAL DAILY
Status: DISCONTINUED | OUTPATIENT
Start: 2024-04-01 | End: 2024-04-05

## 2024-04-01 RX ORDER — BISACODYL 5 MG/1
5 TABLET, DELAYED RELEASE ORAL DAILY PRN
Status: DISCONTINUED | OUTPATIENT
Start: 2024-04-01 | End: 2024-04-06 | Stop reason: HOSPADM

## 2024-04-01 RX ORDER — MULTIPLE VITAMINS W/ MINERALS TAB 9MG-400MCG
1 TAB ORAL DAILY
Status: DISCONTINUED | OUTPATIENT
Start: 2024-04-01 | End: 2024-04-06 | Stop reason: HOSPADM

## 2024-04-01 RX ORDER — CYCLOBENZAPRINE HCL 10 MG
10 TABLET ORAL NIGHTLY
Status: DISCONTINUED | OUTPATIENT
Start: 2024-04-01 | End: 2024-04-06 | Stop reason: HOSPADM

## 2024-04-01 RX ORDER — PANTOPRAZOLE SODIUM 40 MG/1
40 TABLET, DELAYED RELEASE ORAL
Status: DISCONTINUED | OUTPATIENT
Start: 2024-04-01 | End: 2024-04-06 | Stop reason: HOSPADM

## 2024-04-01 RX ORDER — AMOXICILLIN 250 MG
2 CAPSULE ORAL 2 TIMES DAILY PRN
Status: DISCONTINUED | OUTPATIENT
Start: 2024-04-01 | End: 2024-04-06 | Stop reason: HOSPADM

## 2024-04-01 RX ORDER — POLYETHYLENE GLYCOL 3350 17 G/17G
17 POWDER, FOR SOLUTION ORAL DAILY PRN
Status: DISCONTINUED | OUTPATIENT
Start: 2024-04-01 | End: 2024-04-01

## 2024-04-01 RX ORDER — ROPINIROLE 2 MG/1
2 TABLET, FILM COATED ORAL NIGHTLY
Status: DISCONTINUED | OUTPATIENT
Start: 2024-04-01 | End: 2024-04-06 | Stop reason: HOSPADM

## 2024-04-01 RX ORDER — ACETAMINOPHEN 650 MG/1
650 SUPPOSITORY RECTAL EVERY 4 HOURS PRN
Status: DISCONTINUED | OUTPATIENT
Start: 2024-04-01 | End: 2024-04-06 | Stop reason: HOSPADM

## 2024-04-01 RX ORDER — CETIRIZINE HYDROCHLORIDE 10 MG/1
10 TABLET ORAL DAILY
Status: DISCONTINUED | OUTPATIENT
Start: 2024-04-01 | End: 2024-04-06 | Stop reason: HOSPADM

## 2024-04-01 RX ORDER — ONDANSETRON 2 MG/ML
4 INJECTION INTRAMUSCULAR; INTRAVENOUS EVERY 6 HOURS PRN
Status: DISCONTINUED | OUTPATIENT
Start: 2024-04-01 | End: 2024-04-06 | Stop reason: HOSPADM

## 2024-04-01 RX ORDER — ZOLPIDEM TARTRATE 5 MG/1
10 TABLET ORAL NIGHTLY PRN
Status: DISPENSED | OUTPATIENT
Start: 2024-04-01 | End: 2024-04-06

## 2024-04-01 RX ORDER — AMLODIPINE BESYLATE 10 MG/1
10 TABLET ORAL DAILY
Status: DISCONTINUED | OUTPATIENT
Start: 2024-04-01 | End: 2024-04-06 | Stop reason: HOSPADM

## 2024-04-01 RX ADMIN — ROFLUMILAST 500 MCG: 500 TABLET ORAL at 12:50

## 2024-04-01 RX ADMIN — CEFEPIME 2000 MG: 2 INJECTION, POWDER, FOR SOLUTION INTRAVENOUS at 13:38

## 2024-04-01 RX ADMIN — ATORVASTATIN CALCIUM 40 MG: 20 TABLET, FILM COATED ORAL at 21:05

## 2024-04-01 RX ADMIN — PANTOPRAZOLE SODIUM 40 MG: 40 TABLET, DELAYED RELEASE ORAL at 10:26

## 2024-04-01 RX ADMIN — HYDROCODONE BITARTRATE AND ACETAMINOPHEN 1 TABLET: 7.5; 325 TABLET ORAL at 21:05

## 2024-04-01 RX ADMIN — APIXABAN 5 MG: 5 TABLET, FILM COATED ORAL at 10:44

## 2024-04-01 RX ADMIN — MAGNESIUM OXIDE 400 MG (241.3 MG MAGNESIUM) TABLET 400 MG: TABLET at 10:44

## 2024-04-01 RX ADMIN — CYCLOBENZAPRINE 10 MG: 10 TABLET, FILM COATED ORAL at 21:05

## 2024-04-01 RX ADMIN — CETIRIZINE HYDROCHLORIDE 10 MG: 10 TABLET ORAL at 10:44

## 2024-04-01 RX ADMIN — CARVEDILOL 6.25 MG: 6.25 TABLET, FILM COATED ORAL at 18:32

## 2024-04-01 RX ADMIN — SERTRALINE 100 MG: 100 TABLET, FILM COATED ORAL at 10:44

## 2024-04-01 RX ADMIN — IPRATROPIUM BROMIDE AND ALBUTEROL SULFATE 3 ML: .5; 3 SOLUTION RESPIRATORY (INHALATION) at 11:02

## 2024-04-01 RX ADMIN — AMLODIPINE BESYLATE 10 MG: 10 TABLET ORAL at 12:50

## 2024-04-01 RX ADMIN — IPRATROPIUM BROMIDE AND ALBUTEROL SULFATE 3 ML: .5; 3 SOLUTION RESPIRATORY (INHALATION) at 19:55

## 2024-04-01 RX ADMIN — CEFTRIAXONE 2000 MG: 2 INJECTION, POWDER, FOR SOLUTION INTRAMUSCULAR; INTRAVENOUS at 21:04

## 2024-04-01 RX ADMIN — ROPINIROLE 2 MG: 2 TABLET, FILM COATED ORAL at 21:07

## 2024-04-01 RX ADMIN — ONDANSETRON HYDROCHLORIDE 4 MG: 2 INJECTION, SOLUTION INTRAMUSCULAR; INTRAVENOUS at 11:54

## 2024-04-01 RX ADMIN — LEVOFLOXACIN 750 MG: 5 INJECTION, SOLUTION INTRAVENOUS at 01:52

## 2024-04-01 RX ADMIN — LISINOPRIL 40 MG: 40 TABLET ORAL at 10:44

## 2024-04-01 RX ADMIN — HYDROCODONE BITARTRATE AND ACETAMINOPHEN 1 TABLET: 7.5; 325 TABLET ORAL at 13:38

## 2024-04-01 RX ADMIN — BUDESONIDE 0.5 MG: 0.5 INHALANT RESPIRATORY (INHALATION) at 19:57

## 2024-04-01 RX ADMIN — CARVEDILOL 6.25 MG: 6.25 TABLET, FILM COATED ORAL at 10:44

## 2024-04-01 RX ADMIN — AMPICILLIN SODIUM AND SULBACTAM SODIUM 3 G: 2; 1 INJECTION, POWDER, FOR SOLUTION INTRAMUSCULAR; INTRAVENOUS at 21:04

## 2024-04-01 RX ADMIN — BARIUM SULFATE 183 ML: 960 POWDER, FOR SUSPENSION ORAL at 14:36

## 2024-04-01 RX ADMIN — ONDANSETRON HYDROCHLORIDE 4 MG: 2 INJECTION, SOLUTION INTRAMUSCULAR; INTRAVENOUS at 22:23

## 2024-04-01 RX ADMIN — ZOLPIDEM TARTRATE 10 MG: 5 TABLET ORAL at 21:11

## 2024-04-01 RX ADMIN — APIXABAN 5 MG: 5 TABLET, FILM COATED ORAL at 21:05

## 2024-04-01 RX ADMIN — IPRATROPIUM BROMIDE AND ALBUTEROL SULFATE 3 ML: .5; 3 SOLUTION RESPIRATORY (INHALATION) at 06:49

## 2024-04-01 RX ADMIN — FUROSEMIDE 40 MG: 40 TABLET ORAL at 10:26

## 2024-04-01 RX ADMIN — Medication 10 ML: at 10:27

## 2024-04-01 RX ADMIN — Medication 10 ML: at 21:07

## 2024-04-01 RX ADMIN — HYDROCODONE BITARTRATE AND ACETAMINOPHEN 1 TABLET: 7.5; 325 TABLET ORAL at 06:33

## 2024-04-01 RX ADMIN — ALBUTEROL SULFATE 2.5 MG: 2.5 SOLUTION RESPIRATORY (INHALATION) at 02:16

## 2024-04-01 RX ADMIN — Medication 1 TABLET: at 10:44

## 2024-04-01 RX ADMIN — POTASSIUM CHLORIDE 20 MEQ: 750 TABLET, EXTENDED RELEASE ORAL at 10:26

## 2024-04-01 RX ADMIN — ALBUTEROL SULFATE 2.5 MG: 2.5 SOLUTION RESPIRATORY (INHALATION) at 02:17

## 2024-04-01 RX ADMIN — FUROSEMIDE 40 MG: 40 TABLET ORAL at 18:32

## 2024-04-01 RX ADMIN — BUDESONIDE 0.5 MG: 0.5 INHALANT RESPIRATORY (INHALATION) at 11:04

## 2024-04-01 NOTE — NURSING NOTE
Rashmi COSME notified that patient is on unit, complaints of low lung/rib pain and some shortness of air. Patient is positive on sepsis screening. New orders will be placed per Rashmi.

## 2024-04-01 NOTE — CONSULTS
Patient with hiatal hernia swallowing difficulties nausea abdominal pain and recurrent pneumonias              Patient Care Team:  Javan Martinez MD as PCP - General (Internal Medicine)  Ag Melo Jr., MD as Consulting Physician (Pulmonary Disease)  Cleveland Devine MD as Consulting Physician (Gastroenterology)  Earnest Gan MD as Consulting Physician (Cardiology)  Daniela Shin MD PhD as Consulting Physician (Hematology and Oncology)      Subjective     Patient is a 68 y.o. female.  Known to our service she has COPD and chronic respiratory failure uses 2 L O2 chronically recently in the hospital with a pneumonia who presented to the emergency room yesterday evening with increased shortness of breath.  She has extensive past medical history she has valvular heart disease she has had mitral valve replacement of tissue graft cuspid valve repair hernia and prior GI bleeds with a celiac artery stenosis, hypertension, hyperlipidemia, viral hepatitis B, paroxysmal A-fib with prior Maze procedure, pulmonary hypertension.  Since presentation she has had a temperature as high as 100.2.  At presentation her white count was 12,000 770 a week or so earlier it was 7100 procalcitonin was 0.1 and lactate was 0.8  Patient feels bad she was more short of breath she did have some chest pain earlier not this morning.  She is having some upper epigastric discomfort she has had some nausea and some reflux issues.  Some funny feelings when she swallows down the middle of her chest.  She has been trying to sleep with the head of her bed elevated.  Does not really have much in the way of cough and no sputum she has had low-grade fevers and chills, no sore throat or runny nose.  No hemoptysis.    Review of Systems:  No acute headaches or visual changes no polyuria polydipsia or cold intolerances no easy bleeding or bruising no new skin rashes      History  Past Medical History:   Diagnosis Date    VAN (acute kidney  injury)     Anemia     Asthma     Atrial flutter     cardioversion    Cataract     Celiac artery stenosis     Chronic respiratory failure with hypoxia     Colon polyp     COPD (chronic obstructive pulmonary disease)     GI bleed     Hiatal hernia     History of CHF (congestive heart failure)     due to MR    History of home oxygen therapy     3 lpm NC    History of mitral valve replacement with tissue graft     Hyperlipidemia     Hypertension     Infectious viral hepatitis     B    Intertrigo     Long term (current) use of anticoagulants     Mitral regurgitation     s/p tissue MVR    PAF (paroxysmal atrial fibrillation)     s/p MAZE    Pneumonia     Pulmonary hypertension     S/P TVR (tricuspid valve repair) 7/7/2016     Past Surgical History:   Procedure Laterality Date    CARDIAC CATHETERIZATION  09/01/2014    Right dominant systemt, normal coronary arteries.     CARDIAC CATHETERIZATION Left 6/10/2016    Procedure: Cardiac catheterization;  Surgeon: Sergei Hall MD;  Location: [facility] CATH INVASIVE LOCATION;  Service:     CARDIAC CATHETERIZATION N/A 6/10/2016    Procedure: Right Heart Cath;  Surgeon: Sergei Hall MD;  Location: [facility] CATH INVASIVE LOCATION;  Service:     CATARACT EXTRACTION      COLONOSCOPY      COLONOSCOPY N/A 8/4/2017    Procedure: COLONOSCOPY TO CECUM/TI WITH POLYPECTOMY ( COLD BX);  Surgeon: Cleveland Devine MD;  Location: [facility] ENDOSCOPY;  Service:     COLONOSCOPY N/A 8/10/2017    Procedure: COLONOSCOPY to cecum and TI with 2 clips placed at transverse;  Surgeon: Earnest PALOMO MD;  Location: [facility] ENDOSCOPY;  Service:     COLONOSCOPY N/A 12/22/2017    Procedure: COLONOSCOPY INTO CECUM WITH COLD POLYPECTOMIES;  Surgeon: Cleveland Devine MD;  Location: [facility] ENDOSCOPY;  Service:     COLONOSCOPY N/A 5/17/2021    Procedure: COLONOSCOPY to cecum;  Surgeon: Cleveland Devine MD;  Location: [facility] ENDOSCOPY;  Service: Gastroenterology;  Laterality: N/A;  Pre: Fe deficency anemia, h/x of polyps  Post: fair  prep, normal    ENDOSCOPY N/A 2017    Procedure: ESOPHAGOGASTRODUODENOSCOPY;  Surgeon: Porsha Ruby MD;  Location: [facility] ENDOSCOPY;  Service:     ENDOSCOPY N/A 2017    Procedure: ESOPHAGOGASTRODUODENOSCOPY WITH BIOPSIES;  Surgeon: Cleveland Devine MD;  Location[facility] END[facility]COPY;  Service:     ENDOSCOPY N/A 2021    Procedure: ESOPHAGOGASTRODUODENOSCOPY  with biopsies;  Surgeon: Cleveland Devine MD;  [facility];  Service: Gastroenterology;  Laterality: N/A;  Pre: Fe deficency anemia, nausea, heme positive stool   Post: gastritis, sloughing of distal esophagus mucosa    GALLBLADDER SURGERY      HEMORRHOIDECTOMY      HYSTERECTOMY      KIDNEY SURGERY  2013    Stent placement    MAZE PROCEDURE      MITRAL VALVE REPLACEMENT      TONSILLECTOMY      TRICUSPID VALVE REPLACEMENT       Social History     Socioeconomic History    Marital status:     Number of children: 2   Tobacco Use    Smoking status: Former     Current packs/day: 0.00     Average packs/day: 3.0 packs/day for 54.0 years (162.0 ttl pk-yrs)     Types: Cigarettes     Start date:      Quit date:      Years since quittin.2     Passive exposure: Past    Smokeless tobacco: Never    Tobacco comments:     caffeine - tea or mt dew    Vaping Use    Vaping status: Never Used   Substance and Sexual Activity    Alcohol use: No    Drug use: No    Sexual activity: Defer     Family History   Adopted: Yes   Problem Relation Age of Onset    No Known Problems Mother     No Known Problems Father          Allergies:  Bupropion, Cephalexin, and Metoprolol    Medications:  Prior to Admission medications    Medication Sig Start Date End Date Taking? Authorizing Provider   albuterol sulfate  (90 Base) MCG/ACT inhaler Inhale 2 puffs Every 4 (Four) Hours As Needed for Wheezing. 20  Yes Javan Martinez MD   amLODIPine (NORVASC) 10 MG tablet TAKE 1 TABLET BY MOUTH EVERY DAY 22  Yes Javan Martinez MD   apixaban  (ELIQUIS) 5 MG tablet tablet Take 1 tablet by mouth Every 12 (Twelve) Hours. Indications: Atrial Fibrillation 3/20/24  Yes Porsha Piper APRN   atorvastatin (LIPITOR) 40 MG tablet TAKE 1 TABLET BY MOUTH EVERY DAY 11/21/22  Yes Javan Martinez MD   carvedilol (COREG) 6.25 MG tablet TAKE 1 TABLET BY MOUTH TWICE A DAY WITH MEALS 12/13/23  Yes Melanie Sykes APRN   cyclobenzaprine (FLEXERIL) 10 MG tablet TAKE 1 TABLET BY MOUTH EVERYDAY AT BEDTIME 2/20/23  Yes Javan Martinez MD   furosemide (LASIX) 40 MG tablet TAKE 1 TABLET BY MOUTH TWICE A DAY 11/14/22  Yes Javan Martinez MD   HYDROcodone-acetaminophen (NORCO) 7.5-325 MG per tablet Take 1 tablet by mouth Every 6 (Six) Hours As Needed for Moderate Pain (Chronic pain medicine for low back pain). 4/19/23  Yes Javan Martinez MD   ipratropium-albuterol (DUO-NEB) 0.5-2.5 mg/3 ml nebulizer Take 3 mL by nebulization Every 4 (Four) Hours As Needed for Wheezing. 3/24/23  Yes Javan Martinez MD   lisinopril (PRINIVIL,ZESTRIL) 40 MG tablet Take 1 tablet by mouth Daily. 6/5/21  Yes Paresh Olivera MD   loratadine (CLARITIN) 10 MG tablet Take 1 tablet by mouth 2 (two) times a day.   Yes ProviderParesh MD   Magnesium Oxide 400 (240 Mg) MG tablet TAKE 1 TABLET BY MOUTH EVERY DAY 2/20/23  Yes Javan Martinez MD   Multiple Vitamins-Minerals (MULTIVITAL) tablet Take 1 tablet by mouth Daily.   Yes ProviderParesh MD   O2 (OXYGEN) Inhale 2 L/min Continuous.   Yes ProviderParesh MD   Omega-3 Fatty Acids (fish oil) 1000 MG capsule capsule Take  by mouth Every Night.   Yes Paresh Olivera MD   omeprazole (priLOSEC) 40 MG capsule TAKE 1 CAPSULE BY MOUTH EVERY DAY 12/9/22  Yes Javan Martinez MD   ondansetron ODT (ZOFRAN-ODT) 4 MG disintegrating tablet Place 1 tablet on the tongue Every 8 (Eight) Hours As Needed for Nausea or Vomiting. 6/9/23  Yes Fam Sosa MD   polyethylene glycol (MIRALAX) packet Take 17 g by mouth 2 (Two) Times a  Day.  Patient taking differently: Take 17 g by mouth Every 12 (Twelve) Hours As Needed. 8/25/17  Yes Dominique Ponce MD   potassium chloride (K-DUR,KLOR-CON) 20 MEQ CR tablet 1 po tid 3/24/23  Yes Javan Martinez MD   roflumilast (DALIRESP) 500 MCG tablet tablet Take 1 tablet by mouth Daily. 3/24/23  Yes Javan Martinez MD   rOPINIRole (REQUIP) 2 MG tablet TAKE 1 TABLET BY MOUTH EVERY DAY AT NIGHT 2/20/23  Yes Javan Martinez MD   sertraline (ZOLOFT) 100 MG tablet TAKE 1 TABLET BY MOUTH EVERY DAY 12/9/22  Yes Black Campbell MD   zolpidem (AMBIEN) 10 MG tablet Take 1 tablet by mouth At Night As Needed for Sleep. 4/19/23  Yes Javan Martinez MD   budesonide (PULMICORT) 0.5 MG/2ML nebulizer solution Take 2 mL by nebulization 2 (Two) Times a Day for 30 days. Indications: Chronic Obstructive Lung Disease, Chronic hypoxic respiratory failure 4/19/23 11/17/23  Javan Martinez MD   docosanol (ABREVA) 10 % cream cream Apply 1 Application topically to the appropriate area as directed 5 (Five) Times a Day. 3/6/24   Ronald Marie MD   Nebulizer device 1 Units 4 (Four) Times a Day As Needed (Wheezing). J44.1 j20.8 9/23/20   Javan Martinez MD     amLODIPine, 10 mg, Oral, Daily  apixaban, 5 mg, Oral, Q12H  atorvastatin, 40 mg, Oral, Nightly  budesonide, 0.5 mg, Nebulization, BID - RT  carvedilol, 6.25 mg, Oral, BID With Meals  cetirizine, 10 mg, Oral, Daily  cyclobenzaprine, 10 mg, Oral, Nightly  furosemide, 40 mg, Oral, BID  ipratropium-albuterol, 3 mL, Nebulization, 4x Daily - RT  lisinopril, 40 mg, Oral, Daily  Magnesium Oxide -Mg Supplement, 1 tablet, Oral, Daily  multivitamin with minerals, 1 tablet, Oral, Daily  pantoprazole, 40 mg, Oral, Q AM  potassium chloride, 20 mEq, Oral, Daily  roflumilast, 500 mcg, Oral, Daily  rOPINIRole, 2 mg, Oral, Nightly  sertraline, 100 mg, Oral, Daily  sodium chloride, 10 mL, Intravenous, Q12H      O2, 2 L/min        Objective     Vital Signs  Vital Sign Min/Max for last 24  "hours  Temp  Min: 97.3 °F (36.3 °C)  Max: 100.2 °F (37.9 °C)   BP  Min: 122/99  Max: 174/99   Pulse  Min: 55  Max: 123   Resp  Min: 18  Max: 24   SpO2  Min: 91 %  Max: 100 %   Flow (L/min)  Min: 2  Max: 2   Weight  Min: 59 kg (130 lb)  Max: 59 kg (130 lb 1.1 oz)       Intake/Output Summary (Last 24 hours) at 4/1/2024 1013  Last data filed at 4/1/2024 0325  Gross per 24 hour   Intake 150 ml   Output --   Net 150 ml     No intake/output data recorded.  Last Weight and Admission Weight        04/01/24  0344   Weight: 59 kg (130 lb 1.1 oz)     Flowsheet Rows      Flowsheet Row First Filed Value   Admission Height 170.2 cm (67\") Documented at 03/31/2024 2118   Admission Weight 59 kg (130 lb) Documented at 03/31/2024 2118            Body mass index is 20.37 kg/m².           Physical Exam:  General Appearance: Older white female she is resting comfortably in bed she does not look in acute distress  Eyes: Conjunctiva are clear and anicteric  ENT: Mucous membranes are moist no erythema or exudates, Mallampati type II airway, nasal septum midline  Neck: No adenopathy or thyromegaly no jugular venous tension trachea midline  Lungs: Decreased overall but I do not hear any wheezes rales or rhonchi no dullness symmetric expansion she is not labored on 2 L O2  Cardiac: Regular rate rhythm no murmur  Abdomen: Soft nontender no palpable hepatosplenomegaly or masses  : Not examined  Musculoskeletal: Grossly normal  Skin: Warm and dry no jaundice no petechiae  Neuro: She is alert and oriented she is cooperative following commands and grossly intact  Extremities/P Vascular: No clubbing no cyanosis no edema palpable radial and dorsalis pedis pulses  MSE: Pleasant lady she is appropriate      Labs:  Results from last 7 days   Lab Units 04/01/24  0610 03/31/24  2127   GLUCOSE mg/dL 94 123*   SODIUM mmol/L 139 141   POTASSIUM mmol/L 3.7 4.1   CO2 mmol/L 30.0* 30.0*   CHLORIDE mmol/L 101 101   ANION GAP mmol/L 8.0 10.0   CREATININE mg/dL " 0.68 0.76   BUN mg/dL 6* 9   BUN / CREAT RATIO  8.8 11.8   CALCIUM mg/dL 8.9 9.7   ALK PHOS U/L  --  94   TOTAL PROTEIN g/dL  --  6.6   ALT (SGPT) U/L  --  20   AST (SGOT) U/L  --  19   BILIRUBIN mg/dL  --  0.6   ALBUMIN g/dL  --  3.6   GLOBULIN gm/dL  --  3.0     Estimated Creatinine Clearance: 71.7 mL/min (by C-G formula based on SCr of 0.68 mg/dL).      Results from last 7 days   Lab Units 04/01/24  0610 03/31/24 2127   WBC 10*3/mm3 11.24* 12.77*   RBC 10*6/mm3 3.10* 3.43*   HEMOGLOBIN g/dL 8.2* 9.4*   HEMATOCRIT % 25.8* 29.4*   MCV fL 83.2 85.7   MCH pg 26.5* 27.4   MCHC g/dL 31.8 32.0   RDW % 14.5 14.5   RDW-SD fl 43.4 44.9   MPV fL 10.4 10.0   PLATELETS 10*3/mm3 246 268   NEUTROPHIL % % 87.3* 85.8*   LYMPHOCYTE % % 6.8* 7.9*   MONOCYTES % % 4.3* 4.3*   EOSINOPHIL % % 0.4 0.9   BASOPHIL % % 0.2 0.2   IMM GRAN % % 1.0* 0.9*   NEUTROS ABS 10*3/mm3 9.82* 10.96*   LYMPHS ABS 10*3/mm3 0.76 1.01   MONOS ABS 10*3/mm3 0.48 0.55   EOS ABS 10*3/mm3 0.05 0.11   BASOS ABS 10*3/mm3 0.02 0.03   IMMATURE GRANS (ABS) 10*3/mm3 0.11* 0.11*   NRBC /100 WBC 0.0 0.0         Results from last 7 days   Lab Units 03/31/24 2127   HSTROP T ng/L 23*     Results from last 7 days   Lab Units 03/31/24 2127   PROBNP pg/mL 2,009.0*         Results from last 7 days   Lab Units 03/31/24 2223 03/31/24 2127   LACTATE mmol/L 0.8  --    PROCALCITONIN ng/mL  --  0.10         Microbiology Results (last 10 days)       Procedure Component Value - Date/Time    Respiratory Panel PCR w/COVID-19(SARS-CoV-2) KAJAL/MARILIN/DEBBIE/PAD/COR/DAVID In-House, NP Swab in UTM/VTM, 2 HR TAT - Swab, Nasopharynx [[id number]]  (Normal) Collected: 03/31/24 2222    Lab Status: Final result Specimen: Swab from Nasopharynx Updated: 03/31/24 2321     ADENOVIRUS, PCR Not Detected     Coronavirus 229E Not Detected     Coronavirus HKU1 Not Detected     Coronavirus NL63 Not Detected     Coronavirus OC43 Not Detected     COVID19 Not Detected     Human Metapneumovirus Not Detected      Human Rhinovirus/Enterovirus Not Detected     Influenza A PCR Not Detected     Influenza B PCR Not Detected     Parainfluenza Virus 1 Not Detected     Parainfluenza Virus 2 Not Detected     Parainfluenza Virus 3 Not Detected     Parainfluenza Virus 4 Not Detected     RSV, PCR Not Detected     Bordetella pertussis pcr Not Detected     Bordetella parapertussis PCR Not Detected     Chlamydophila pneumoniae PCR Not Detected     Mycoplasma pneumo by PCR Not Detected    Narrative:      In the setting of a positive respiratory panel with a viral infection PLUS a negative procalcitonin without other underlying concern for bacterial infection, consider observing off antibiotics or discontinuation of antibiotics and continue supportive care. If the respiratory panel is positive for atypical bacterial infection (Bordetella pertussis, Chlamydophila pneumoniae, or Mycoplasma pneumoniae), consider antibiotic de-escalation to target atypical bacterial infection.    Respiratory Panel PCR w/COVID-19(SARS-CoV-2) KAJAL/MARILIN/DEBBIE/PAD/COR/DAVID In-House, NP Swab in UTM/VTM, 2 HR TAT - Swab, Nasopharynx [[id number]]  (Normal) Collected: 03/23/24 0727    Lab Status: Final result Specimen: Swab from Nasopharynx Updated: 03/23/24 0835     ADENOVIRUS, PCR Not Detected     Coronavirus 229E Not Detected     Coronavirus HKU1 Not Detected     Coronavirus NL63 Not Detected     Coronavirus OC43 Not Detected     COVID19 Not Detected     Human Metapneumovirus Not Detected     Human Rhinovirus/Enterovirus Not Detected     Influenza A PCR Not Detected     Influenza B PCR Not Detected     Parainfluenza Virus 1 Not Detected     Parainfluenza Virus 2 Not Detected     Parainfluenza Virus 3 Not Detected     Parainfluenza Virus 4 Not Detected     RSV, PCR Not Detected     Bordetella pertussis pcr Not Detected     Bordetella parapertussis PCR Not Detected     Chlamydophila pneumoniae PCR Not Detected     Mycoplasma pneumo by PCR Not Detected    Narrative:      In  the setting of a positive respiratory panel with a viral infection PLUS a negative procalcitonin without other underlying concern for bacterial infection, consider observing off antibiotics or discontinuation of antibiotics and continue supportive care. If the respiratory panel is positive for atypical bacterial infection (Bordetella pertussis, Chlamydophila pneumoniae, or Mycoplasma pneumoniae), consider antibiotic de-escalation to target atypical bacterial infection.              Diagnostics:  XR Chest 1 View    Result Date: 3/31/2024  SINGLE VIEW OF THE CHEST  HISTORY: Shortness of breath  COMPARISON: March 23, 2024  FINDINGS: There is cardiomegaly. Patient is status post median sternotomy. There are background emphysematous changes. When compared to prior study, patient appears to have some patchy infiltrate within the right midlung. There may be some additional mild consolidation at the left lung base. No pneumothorax or large effusion is seen.      Patchy infiltrate suspected within the right midlung. There may be some additional involvement at the left lung base.  This report was finalized on 3/31/2024 10:29 PM by Dr. Leigh Parr M.D on Workstation: [id number]      XR Chest 1 View    Result Date: 3/23/2024  XR CHEST 1 VW-  HISTORY: Female who is 68 years-old, short of breath  TECHNIQUE: Frontal view of the chest  COMPARISON: 3/2/2024  FINDINGS: The heart size is borderline. Sternotomy wires are present. Pulmonary vasculature is unremarkable. Aorta is calcified. No focal pulmonary consolidation, pleural effusion, or pneumothorax. No acute osseous process.      No focal pulmonary consolidation. Borderline heart size. Follow-up as clinical indications persist.  This report was finalized on 3/23/2024 7:33 AM by Dr. Chapincito Garcia M.D on Workstation: [id number]      Adult Transthoracic Echo Complete w/ Color, Spectral and Contrast if Necessary Per Protocol    Result Date: 3/3/2024    Left ventricular  systolic function is hyperdynamic (EF > 70%). Calculated left ventricular EF = 73.7%   Left ventricular wall thickness is consistent with concentric hypertrophy.   Left ventricular diastolic function was indeterminate.   There is a bioprosthetic mitral valve present. There is a moderate, non-mobile mass on the posterior leaflet(s) of the prosthetic mitral valve that is consistent with a vegetation.   Mild aortic valve stenosis is present. Aortic valve area is 1.8 cm2.   Aortic valve maximum pressure gradient is 15 mmHg. Aortic valve mean pressure gradient is 9 mmHg.   Moderate mitral valve stenosis is present. The mitral valve peak gradient is 21 mmHg. The mitral valve mean gradient is 9 mmHg.   Mild mitral valve regurgitation is present.   Mild tricuspid valve regurgitation is present.   Estimated right ventricular systolic pressure from tricuspid regurgitation is mildly elevated (35-45 mmHg). Calculated right ventricular systolic pressure from tricuspid regurgitation is 41 mmHg.     CT Chest Without Contrast Diagnostic    Result Date: 3/2/2024  CT OF THE CHEST WITHOUT CONTRAST  HISTORY: Cough and fever  COMPARISON: November 17, 2023  TECHNIQUE: Axial CT imaging was obtained through the thorax. No IV contrast was administered.  FINDINGS: Right apical scarring appears stable. There are advanced background emphysematous changes. There is some vague groundglass attenuation which is noted within the left lower lobe, which is more apparent than on prior exam, and which may represent an acute infectious or inflammatory process. The thyroid gland and trachea are within normal limits. There is a small hiatal hernia. Patient is status post mitral and tricuspid valve replacement. Mediastinal lymph nodes do not appear pathologically enlarged. Thoracic aorta is normal in caliber. There are coronary artery calcifications. Images through the upper abdomen do not demonstrate any acute abnormalities. No acute osseous abnormalities  are seen.       The patient has some vague groundglass attenuation within the left lower lobe. An acute infectious or inflammatory process is not excluded.  Radiation dose reduction techniques were utilized, including automated exposure control and exposure modulation based on body size.   This report was finalized on 3/2/2024 7:40 PM by Dr. Leigh Parr M.D on Workstation: [id number]      XR Chest 1 View    Result Date: 3/2/2024  XR CHEST 1 VW-  HISTORY: 68-year-old female with cough and fever.  FINDINGS: Advanced emphysema. There is increased density and markings at the lingula suspicious for acute pneumonia and there may be an acute infiltrate at the medial aspect of the right lower lobe superimposed on chronic change as well. There is no evidence for effusions or CHF.  This report was finalized on 3/2/2024 6:40 PM by Dr. Franchesca Marrufo M.D on Workstation: [id number]      Results for orders placed during the hospital encounter of 03/02/24    Adult Transthoracic Echo Complete w/ Color, Spectral and Contrast if Necessary Per Protocol    Interpretation Summary    Left ventricular systolic function is hyperdynamic (EF > 70%). Calculated left ventricular EF = 73.7%    Left ventricular wall thickness is consistent with concentric hypertrophy.    Left ventricular diastolic function was indeterminate.    There is a bioprosthetic mitral valve present. There is a moderate, non-mobile mass on the posterior leaflet(s) of the prosthetic mitral valve that is consistent with a vegetation.    Mild aortic valve stenosis is present. Aortic valve area is 1.8 cm2.    Aortic valve maximum pressure gradient is 15 mmHg. Aortic valve mean pressure gradient is 9 mmHg.    Moderate mitral valve stenosis is present. The mitral valve peak gradient is 21 mmHg. The mitral valve mean gradient is 9 mmHg.    Mild mitral valve regurgitation is present.    Mild tricuspid valve regurgitation is present.    Estimated right ventricular systolic  pressure from tricuspid regurgitation is mildly elevated (35-45 mmHg). Calculated right ventricular systolic pressure from tricuspid regurgitation is 41 mmHg.      I personally reviewed the chest x-ray and I agree with radiology looks like there is a new right midlung infiltrate compared to 3/23 I am not sure there is a whole lot of change in the left base    Assessment & Plan     Pneumonia new infiltrates her chest x-ray was pretty clear a week ago sounds like she is having recurrent episodes the respiratory pathogen panel is negative blood cultures are pending.  This can have recurrent infiltrates raises the question of aspiration completed to get a swallow evaluation.  Looks like she had a dose of Levaquin in the emergency department early this morning she reports a rash to cefdinir but is tolerated multiple IV cephalosporins and Zosyn in the past.  With the recurrent infections we also have to worry about resistant organisms I will check an MRSA screen.  Will continue some antipseudomonal coverage cultures will use cefepime  COPD do not think she is in an acute exacerbation do not think systemic steroids are indicated at this time continue bronchodilators  Chronic respiratory failure with hypoxia  Pulmonary hypertension by echocardiogram mild to moderate  Valvular heart disease prior mitral valve replacement with tissue valve and tricuspid valve repair  Paroxysmal A-fib status post Maze procedure  Hypertension  Hyperlipidemia  Hiatal hernia has had some epigastric and chest pain and some pain up and down the middle of her chest some swallowing issues I am going to ask for an esophagram as well      gA Melo Jr, MD  04/01/24  10:13 EDT    Time:

## 2024-04-01 NOTE — PLAN OF CARE
Problem: Adult Inpatient Plan of Care  Goal: Plan of Care Review  Outcome: Ongoing, Progressing  Flowsheets (Taken 4/1/2024 0405)  Plan of Care Reviewed With: patient  Goal: Patient-Specific Goal (Individualized)  Outcome: Ongoing, Progressing  Goal: Absence of Hospital-Acquired Illness or Injury  Outcome: Ongoing, Progressing  Intervention: Identify and Manage Fall Risk  Recent Flowsheet Documentation  Taken 4/1/2024 0358 by Almita Jha RN  Safety Promotion/Fall Prevention: safety round/check completed  Taken 4/1/2024 0357 by Almita Jha RN  Safety Promotion/Fall Prevention:   activity supervised   assistive device/personal items within reach   clutter free environment maintained   safety round/check completed  Intervention: Prevent Skin Injury  Recent Flowsheet Documentation  Taken 4/1/2024 0358 by Almita Jha RN  Body Position: position changed independently  Skin Protection: adhesive use limited  Taken 4/1/2024 0357 by Almita Jha RN  Body Position: position changed independently  Intervention: Prevent and Manage VTE (Venous Thromboembolism) Risk  Recent Flowsheet Documentation  Taken 4/1/2024 0358 by Almita Jha RN  VTE Prevention/Management:   bilateral   sequential compression devices off   patient refused intervention  Range of Motion: active ROM (range of motion) encouraged  Intervention: Prevent Infection  Recent Flowsheet Documentation  Taken 4/1/2024 0358 by Almita Jha RN  Infection Prevention: single patient room provided  Goal: Optimal Comfort and Wellbeing  Outcome: Ongoing, Progressing  Intervention: Provide Person-Centered Care  Recent Flowsheet Documentation  Taken 4/1/2024 0358 by Almita Jha RN  Trust Relationship/Rapport:   care explained   choices provided  Goal: Readiness for Transition of Care  Outcome: Ongoing, Progressing  Intervention: Mutually Develop Transition Plan  Recent Flowsheet Documentation  Taken 4/1/2024 0354 by Almita Jha RN  Transportation  Anticipated: family or friend will provide  Patient/Family Anticipated Services at Transition: none  Patient/Family Anticipates Transition to: home  Taken 4/1/2024 0352 by Almita Jha RN  Equipment Currently Used at Home: oxygen     Problem: Fall Injury Risk  Goal: Absence of Fall and Fall-Related Injury  Outcome: Ongoing, Progressing  Intervention: Identify and Manage Contributors  Recent Flowsheet Documentation  Taken 4/1/2024 0358 by Almita Jha RN  Medication Review/Management: medications reviewed  Taken 4/1/2024 0357 by Almita Jha RN  Medication Review/Management: medications reviewed  Intervention: Promote Injury-Free Environment  Recent Flowsheet Documentation  Taken 4/1/2024 0358 by Almita Jha RN  Safety Promotion/Fall Prevention: safety round/check completed  Taken 4/1/2024 0357 by Almita Jha RN  Safety Promotion/Fall Prevention:   activity supervised   assistive device/personal items within reach   clutter free environment maintained   safety round/check completed     Problem: Fluid Imbalance (Pneumonia)  Goal: Fluid Balance  Outcome: Ongoing, Progressing     Problem: Infection (Pneumonia)  Goal: Resolution of Infection Signs and Symptoms  Outcome: Ongoing, Progressing  Intervention: Prevent Infection Progression  Recent Flowsheet Documentation  Taken 4/1/2024 0358 by Almita Jha RN  Isolation Precautions: (respiratory panel negative) other (see comments)     Problem: Respiratory Compromise (Pneumonia)  Goal: Effective Oxygenation and Ventilation  Outcome: Ongoing, Progressing  Intervention: Promote Airway Secretion Clearance  Recent Flowsheet Documentation  Taken 4/1/2024 0358 by Almita Jha RN  Cough And Deep Breathing: done independently per patient  Intervention: Optimize Oxygenation and Ventilation  Recent Flowsheet Documentation  Taken 4/1/2024 0358 by Almita Jha RN  Head of Bed (HOB) Positioning: HOB at 20 degrees  Taken 4/1/2024 0357 by Almita Jha RN  Head  of Bed (HOB) Positioning: HOB at 20 degrees     Problem: Adjustment to Illness COPD (Chronic Obstructive Pulmonary Disease)  Goal: Optimal Chronic Illness Coping  Outcome: Ongoing, Progressing  Intervention: Support and Optimize Psychosocial Response  Recent Flowsheet Documentation  Taken 4/1/2024 0358 by Almita Jha RN  Supportive Measures: active listening utilized  Family/Support System Care: self-care encouraged     Problem: Functional Ability Impaired COPD (Chronic Obstructive Pulmonary Disease)  Goal: Optimal Level of Functional [place]  Outcome: Ongoing, Progressing  Intervention: Optimize Functional Ability  Recent Flowsheet Documentation  Taken 4/1/2024 0358 by Almita Jha RN  Environmental Support: calm environment promoted     Problem: Infection COPD (Chronic Obstructive Pulmonary Disease)  Goal: Absence of Infection Signs and Symptoms  Outcome: Ongoing, Progressing  Intervention: Prevent or Manage Infection  Recent Flowsheet Documentation  Taken 4/1/2024 0358 by Almita Jha RN  Isolation Precautions: (respiratory panel negative) other (see comments)     Problem: Oral Intake Inadequate COPD (Chronic Obstructive Pulmonary Disease)  Goal: Improved Nutrition Intake  Outcome: Ongoing, Progressing     Problem: Respiratory Compromise COPD (Chronic Obstructive Pulmonary Disease)  Goal: Effective Oxygenation and Ventilation  Outcome: Ongoing, Progressing  Intervention: Promote Airway Secretion Clearance  Recent Flowsheet Documentation  Taken 4/1/2024 0358 by Almita Jha RN  Cough And Deep Breathing: done independently per patient  Intervention: Optimize Oxygenation and Ventilation  Recent Flowsheet Documentation  Taken 4/1/2024 0358 by Almita Jha RN  Head of Bed (HOB) Positioning: HOB at 20 degrees  Taken 4/1/2024 0357 by Almita Jha RN  Head of Bed (HOB) Positioning: HOB at 20 degrees     Problem: Infection  Goal: Absence of Infection Signs and Symptoms  Outcome: Ongoing,  Progressing  Intervention: Prevent or Manage Infection  Recent Flowsheet Documentation  Taken 4/1/2024 0358 by Almita Jha RN  Isolation Precautions: (respiratory panel negative) other (see comments)     Problem: Activity Intolerance (Pulmonary Impairment)  Goal: Improved Activity Tolerance  Outcome: Ongoing, Progressing     Problem: Airway Clearance Ineffective (Pulmonary Impairment)  Goal: Effective Airway Clearance  Outcome: Ongoing, Progressing     Problem: Gas Exchange Impaired (Pulmonary Impairment)  Goal: Optimal Gas Exchange  Outcome: Ongoing, Progressing     Problem: Adjustment to Illness (Sepsis/Septic Shock)  Goal: Optimal Coping  Outcome: Ongoing, Progressing  Intervention: Optimize Psychosocial Adjustment to Illness  Recent Flowsheet Documentation  Taken 4/1/2024 0358 by Almita Jha RN  Supportive Measures: active listening utilized  Family/Support System Care: self-care encouraged     Problem: Bleeding (Sepsis/Septic Shock)  Goal: Absence of Bleeding  Outcome: Ongoing, Progressing     Problem: Glycemic Control Impaired (Sepsis/Septic Shock)  Goal: Blood Glucose Level Within Desired Range  Outcome: Ongoing, Progressing     Problem: Infection Progression (Sepsis/Septic Shock)  Goal: Absence of Infection Signs and Symptoms  Outcome: Ongoing, Progressing  Intervention: Initiate Sepsis Management  Recent Flowsheet Documentation  Taken 4/1/2024 0358 by Almita Jha RN  Infection Prevention: single patient room provided  Isolation Precautions: (respiratory panel negative) other (see comments)  Intervention: Promote Recovery  Recent Flowsheet Documentation  Taken 4/1/2024 0358 by Almita Jha RN  Sleep/Rest Enhancement: awakenings minimized     Problem: Nutrition Impaired (Sepsis/Septic Shock)  Goal: Optimal Nutrition Intake  Outcome: Ongoing, Progressing     Problem: COPD (Chronic Obstructive Pulmonary Disease) Comorbidity  Goal: Maintenance of COPD Symptom Control  Outcome: Ongoing,  Progressing  Intervention: Maintain COPD-Symptom Control  Recent Flowsheet Documentation  Taken 4/1/2024 0358 by Almita Jha RN  Supportive Measures: active listening utilized  Medication Review/Management: medications reviewed  Taken 4/1/2024 0357 by Almita Jha RN  Medication Review/Management: medications reviewed     Problem: Heart Failure Comorbidity  Goal: Maintenance of Heart Failure Symptom Control  Outcome: Ongoing, Progressing  Intervention: Maintain Heart Failure-Management  Recent Flowsheet Documentation  Taken 4/1/2024 0358 by Almita Jha RN  Medication Review/Management: medications reviewed  Taken 4/1/2024 0357 by Almita Jha RN  Medication Review/Management: medications reviewed   Goal Outcome Evaluation:  Plan of Care Reviewed With: patient

## 2024-04-01 NOTE — THERAPY EVALUATION
Patient Name: Paz Browne  : 1955    MRN: [id number]                              Today's Date: 2024       Admit Date: 3/31/2024    Visit Dx:     ICD-10-CM ICD-9-CM   1. Pneumonia due to infectious organism, unspecified laterality, unspecified part of lung  J18.9 486     Patient Active Problem List   Diagnosis    PAF (paroxysmal atrial fibrillation)    Hypertension    Hyperlipidemia    Pain medication agreement    Hiatal hernia    Primary osteoarthritis involving multiple joints    Pulmonary hypertension    S/P TVR (tricuspid valve repair)    Pulmonary nodule    Restless leg syndrome    Chronic low back pain    Dysplastic polyp of colon    History of mitral valve replacement with tissue graft    Chronic hypoxemic respiratory failure    Anemia    Celiac artery stenosis    Intertrigo    Abnormal EKG    Muscle spasms of both lower extremities    Iron deficiency anemia    Adenomatous polyp of colon    Gastroesophageal reflux disease without esophagitis    Headache    History of endocarditis    Pneumonia of both lower lobes due to infectious organism    Peptic ulcer disease    Peripheral vascular disease    Hyperlipidemia    Hyperglycemia    COPD with acute exacerbation    Chronic diastolic CHF (congestive heart failure)    Chronic bilateral low back pain    COPD exacerbation    Leukocytosis    Elevated troponin    Pneumonia    Acute-on-chronic respiratory failure     Past Medical History:   Diagnosis Date    VAN (acute kidney injury)     Anemia     Asthma     Atrial flutter     cardioversion    Cataract     Celiac artery stenosis     Chronic respiratory failure with hypoxia     Colon polyp     COPD (chronic obstructive pulmonary disease)     GI bleed     Hiatal hernia     History of CHF (congestive heart failure)     due to MR    History of home oxygen therapy     3 lpm [place]    History of mitral valve replacement with tissue graft     Hyperlipidemia     Hypertension     Infectious viral hepatitis     B     Intertrigo     Long term (current) use of anticoagulants     Mitral regurgitation     s/p tissue MVR    PAF (paroxysmal atrial fibrillation)     s/p MAZE    Pneumonia     Pulmonary hypertension     S/P TVR (tricuspid valve repair) 7/7/2016     Past Surgical History:   Procedure Laterality Date    CARDIAC CATHETERIZATION  09/01/2014    Right dominant systemt, normal coronary arteries.     CARDIAC CATHETERIZATION Left 6/10/2016    Procedure: Cardiac catheterization;  Surgeon: Sergei Hall MD;  Location: [facility] CATH INVASIVE LOCATION;  Service:     CARDIAC CATHETERIZATION N/A 6/10/2016    Procedure: Right Heart Cath;  Surgeon: Sergei Hall MD;  Location: [facility] CATH INVASIVE LOCATION;  Service:     CATARACT EXTRACTION      COLONOSCOPY      COLONOSCOPY N/A 8/4/2017    Procedure: COLONOSCOPY TO CECUM/TI WITH POLYPECTOMY ( COLD BX);  Surgeon: Cleveland Devine MD;  Location: [facility] ENDOSCOPY;  Service:     COLONOSCOPY N/A 8/10/2017    Procedure: COLONOSCOPY to cecum and TI with 2 clips placed at transverse;  Surgeon: Earnest PLAOMO MD;  Location: [facility] ENDOSCOPY;  Service:     COLONOSCOPY N/A 12/22/2017    Procedure: COLONOSCOPY INTO CECUM WITH COLD POLYPECTOMIES;  Surgeon: Cleveland Devine MD;  Location: [facility] ENDOSCOPY;  Service:     COLONOSCOPY N/A 5/17/2021    Procedure: COLONOSCOPY to cecum;  Surgeon: Cleveland Devine MD;  Location: [facility] ENDOSCOPY;  Service: Gastroenterology;  Laterality: N/A;  Pre: Fe deficency anemia, h/x of polyps  Post: fair prep, normal    ENDOSCOPY N/A 8/17/2017    Procedure: ESOPHAGOGASTRODUODENOSCOPY;  Surgeon: Porsha Ruby MD;  Location: [facility] ENDOSCOPY;  Service:     ENDOSCOPY N/A 12/22/2017    Procedure: ESOPHAGOGASTRODUODENOSCOPY WITH BIOPSIES;  Surgeon: Cleveland Devine MD;  Location: [facility] ENDOSCOPY;  Service:     ENDOSCOPY N/A 5/17/2021    Procedure: ESOPHAGOGASTRODUODENOSCOPY  with biopsies;  Surgeon: Cleveland Devine MD;  Location: [facility] ENDOSCOPY;  Service: Gastroenterology;  Laterality:  N/A;  Pre: Fe deficency anemia, nausea, heme positive stool   Post: gastritis, sloughing of distal esophagus mucosa    GALLBLADDER SURGERY      HEMORRHOIDECTOMY      HYSTERECTOMY      KIDNEY SURGERY  04/22/2013    Stent placement    MAZE PROCEDURE      MITRAL VALVE REPLACEMENT      TONSILLECTOMY      TRICUSPID VALVE REPLACEMENT        General Information       Row Name 04/01/24 0839          Physical Therapy Time and Intention    Document Type evaluation  -CW     Mode of Treatment individual therapy;physical therapy  -       Row Name 04/01/24 0839          General Information    Patient Profile Reviewed yes  -CW     Prior Level of Function independent:;transfer;all household mobility;bed mobility  no AD within the home, scooter for long distances in community  -CW     Existing Precautions/Restrictions fall;oxygen therapy device and L/min  -CW     Barriers to Rehab none identified  -CW       Row Name 04/01/24 0839          Home Main Entrance    Number of Stairs, Main Entrance none  -CW       Row Name 04/01/24 0839          Stairs Within Home, Primary    Number of Stairs, Within Home, Primary none  -CW       Row Name 04/01/24 0839          Cognition    Orientation Status (Cognition) oriented x 4  -CW       Row Name 04/01/24 0839          Safety Issues, Functional Mobility    Impairments Affecting Function (Mobility) endurance/activity tolerance;shortness of breath;strength  -               User Key  (r) = Recorded By, (t) = Taken By, (c) = Cosigned By      Initials Name Provider Type    CW Stephanie Vazquez PT Physical Therapist                   Mobility       Row Name 04/01/24 0839          Bed Mobility    Comment, (Bed Mobility) sitting EOB for breakfast upon arrival  -       Row Name 04/01/24 0839          Sit-Stand Transfer    Sit-Stand [place] (Transfers) supervision  -       Row Name 04/01/24 0839          Gait/Stairs (Locomotion)    [place] Level (Gait) supervision  -     Assistive Device  (Gait) other (see comments)  none  -CW     Distance in Feet (Gait) 15  -CW     Deviations/Abnormal Patterns (Gait) gait speed decreased  -CW               User Key  (r) = Recorded By, (t) = Taken By, (c) = Cosigned By      Initials Name Provider Type    Stephanie Fields PT Physical Therapist                   Obj/Interventions       Row Name 04/01/24 0840          Range of Motion Comprehensive    General Range of Motion bilateral lower extremity ROM WFL  -CW       Row Name 04/01/24 0840          Strength Comprehensive (MMT)    Comment, General Manual Muscle Testing (MMT) Assessment no focal LE deficits observed, generalized weakness  -CW       Row Name 04/01/24 0840          Balance    Balance Assessment standing static balance;standing dynamic balance;sitting static balance  -CW     Static Sitting Balance independent  -CW     Position, Sitting Balance sitting edge of bed  -CW     Static Standing Balance supervision  -CW     Dynamic Standing Balance supervision  -CW     Position/Device Used, Standing Balance unsupported  -CW               User Key  (r) = Recorded By, (t) = Taken By, (c) = Cosigned By      Initials Name Provider Type    Stephanie Fields PT Physical Therapist                   Goals/Plan       Row Name 04/01/24 0846          Bed Mobility Goal 1 (PT)    Activity/Assistive Device (Bed Mobility Goal 1, PT) bed mobility activities, all  -CW     [place] Level/Cues Needed (Bed Mobility Goal 1, PT) independent  -CW     Time Frame (Bed Mobility Goal 1, PT) 1 week  -CW       Row Name 04/01/24 0846          Transfer Goal 1 (PT)    Activity/Assistive Device (Transfer Goal 1, PT) sit-to-stand/stand-to-sit;bed-to-chair/chair-to-bed  -CW     [place] Level/Cues Needed (Transfer Goal 1, PT) independent  -CW     Time Frame (Transfer Goal 1, PT) 1 week  -CW       Row Name 04/01/24 0846          Gait Training Goal 1 (PT)    Activity/Assistive Device (Gait Training Goal 1, PT) gait (walking  locomotion)  -CW     [place] Level (Gait Training Goal 1, PT) modified independence  -CW     Distance (Gait Training Goal 1, PT) 100'  -CW     Time Frame (Gait Training Goal 1, PT) 1 week  -CW       Row Name 04/01/24 0846          Therapy Assessment/Plan (PT)    Planned Therapy Interventions (PT) balance training;bed mobility training;gait training;ROM (range of motion);strengthening;patient/family education;transfer training  -CW               User Key  (r) = Recorded By, (t) = Taken By, (c) = Cosigned By      Initials Name Provider Type    CW Stephanie Vazquez, PT Physical Therapist                   Clinical Impression       Row Name 04/01/24 0841          Pain    Pretreatment Pain Rating 0/10 - no pain  -CW       Row Name 04/01/24 0841          Plan of Care Review    Plan of Care Reviewed With patient;family  -CW     Outcome Evaluation Pt is a 67 yo female admitted to [facility] with SOA and cough. Pt with hx of copd, A fib, chf, htn. Workup for PNA. Pt lives with spouse in a trailer home. She reports use of oxygen at home, no AD and denies any falls. Pt reports she typically is independent with household mobility and uses a scooter for community mobility. Today, pt sitting at EOB with breakfast. She stood with supervisionand ambulated 15' without AD. Limited by feeling SOA but no overt balance deficits observed.  -CW       Row Name 04/01/24 0841          Therapy Assessment/Plan (PT)    Rehab Potential (PT) good, to achieve stated therapy goals  -CW     Criteria for Skilled Interventions Met (PT) yes  -CW     Therapy Frequency (PT) 5 times/wk  -CW       Row Name 04/01/24 0841          Vital Signs    O2 Delivery Pre Treatment nasal cannula  -CW     O2 Delivery Intra Treatment nasal cannula  -CW     O2 Delivery Post Treatment nasal cannula  -CW       Row Name 04/01/24 0841          Positioning and Restraints    Pre-Treatment Position in bed  -CW     Post Treatment Position bed  -CW     In Bed sitting EOB;notified  nsg;call light within reach;encouraged to call for assist;exit alarm on  -CW               User Key  (r) = Recorded By, (t) = Taken By, (c) = Cosigned By      Initials Name Provider Type    Stephanie Fields PT Physical Therapist                   Outcome Measures       Row Name 04/01/24 0846 04/01/24 0354       How much help from another person do you currently need...    Turning from your back to your side while in flat bed without using bedrails? 4  -CW 3  -MS    Moving from lying on back to sitting on the side of a flat bed without bedrails? 4  -CW 3  -MS    Moving to and from a bed to a chair (including a wheelchair)? 3  -CW 3  -MS    Standing up from a chair using your arms (e.g., wheelchair, bedside chair)? 3  -CW 3  -MS    Climbing 3-5 steps with a railing? 3  -CW 2  -MS    To walk in hospital room? 3  -CW 3  -MS    AM-PAC 6 Clicks Score (PT) 20  -CW 17  -MS    Highest Level of Mobility Goal 6 --> Walk 10 steps or more  -CW 5 --> Static standing  -MS      Row Name 04/01/24 0846          Functional Assessment    Outcome Measure Options AM-PAC 6 Clicks Basic Mobility (PT)  -CW               User Key  (r) = Recorded By, (t) = Taken By, (c) = Cosigned By      Initials Name Provider Type    Almita Cyr, RN Registered Nurse    Stephanie Fields, MILY Physical Therapist                                 Physical Therapy Education       Title: PT OT SLP Therapies (In Progress)       Topic: Physical Therapy (In Progress)       Point: Mobility training (Done)       Learning Progress Summary             Patient Acceptance, E, VU by LEONA at 4/1/2024 0847                         Point: Home exercise program (Not Started)       Learner Progress:  Not documented in this visit.              Point: Body mechanics (Not Started)       Learner Progress:  Not documented in this visit.              Point: Precautions (Not Started)       Learner Progress:  Not documented in this visit.                              User Key        Initials Effective Dates Name Provider Type Discipline     12/13/22 -  Stephanie Vazquez PT Physical Therapist PT                  PT Recommendation and Plan  Planned Therapy Interventions (PT): balance training, bed mobility training, gait training, ROM (range of motion), strengthening, patient/family education, transfer training  Plan of Care Reviewed With: patient, family  Outcome Evaluation: Pt is a 67 yo female admitted to [facility] with SOA and cough. Pt with hx of copd, A fib, chf, htn. Workup for PNA. Pt lives with spouse in a trailer home. She reports use of oxygen at home, no AD and denies any falls. Pt reports she typically is independent with household mobility and uses a scooter for community mobility. Today, pt sitting at EOB with breakfast. She stood with supervisionand ambulated 15' without AD. Limited by feeling SOA but no overt balance deficits observed.     Time Calculation:         PT Charges       Row Name 04/01/24 0838             Time Calculation    Start Time 0819  -CW      Stop Time 0829  -CW      Time Calculation (min) 10 min  -CW      PT Received On 04/01/24  -CW      PT - Next Appointment 04/02/24  -CW      PT Goal Re-Cert Due Date 04/08/24  -                User Key  (r) = Recorded By, (t) = Taken By, (c) = Cosigned By      Initials Name Provider Type    CW Stephanie Vazquez PT Physical Therapist                  Therapy Charges for Today       Code Description Service Date Service Provider Modifiers Qty    [id number]  PT EVAL MOD COMPLEXITY 2 4/1/2024 Stephanie Vazquez PT GP 1            PT G-Codes  Outcome Measure Options: AM-PAC 6 Clicks Basic Mobility (PT)  AM-PAC 6 Clicks Score (PT): 20  PT Discharge Summary  Anticipated Discharge Disposition (PT): home, home with assist    Stephanie Vazquez PT  4/1/2024

## 2024-04-01 NOTE — ED NOTES
Nursing report ED to floor  Paz Browne  68 y.o.  female    HPI :  HPI (Adult)  Stated Reason for Visit: Shortness of breath x 4 weeks, pt states she is on 2L O2 nasal cannula at baseline. Pt reports she was treated here earlier this month and reports no improvement.  History Obtained From: patient  Duration (Weeks): 4    Chief Complaint  Chief Complaint   Patient presents with    Shortness of Breath       Admitting doctor:   Davis Mendoza MD    Admitting diagnosis:   The encounter diagnosis was Pneumonia due to infectious organism, unspecified laterality, unspecified part of lung.    Code status:   Current Code Status       Date Active Code Status Order ID Comments User Context       Prior            Allergies:   Bupropion, Cephalexin, and Metoprolol    Isolation:   Enhanced Droplet/Contact     Intake and Output  No intake or output data in the 24 hours ending 04/01/24 0221    Weight:       03/31/24 2118   Weight: 59 kg (130 lb)       Most recent vitals:   Vitals:    04/01/24 0035 04/01/24 0105 04/01/24 0133 04/01/24 0135   BP: 148/71 164/67 137/89 154/75   Patient Position:   Lying    Pulse: 103 117 55 115   Resp:   20    Temp:   100.2 °F (37.9 °C)    TempSrc:   Tympanic    SpO2: 94% 95% 96% 95%   Weight:       Height:           Active LDAs/IV Access:   Lines, Drains & Airways       Active LDAs       Name Placement date Placement time Site Days    Peripheral IV 04/01/24 0152 Anterior;Proximal;Right Forearm 04/01/24  0152  Forearm  less than 1                    Labs (abnormal labs have a star):   Labs Reviewed   COMPREHENSIVE METABOLIC PANEL - Abnormal; Notable for the following components:       Result Value    Glucose 123 (*)     CO2 30.0 (*)     All other components within normal limits    Narrative:     GFR Normal >60  Chronic Kidney Disease <60  Kidney Failure <15     BNP (IN-HOUSE) - Abnormal; Notable for the following components:    proBNP 2,009.0 (*)     All other components within normal limits     Narrative:     This assay is used as an aid in the diagnosis of individuals suspected of having heart failure. It can be used as an aid in the diagnosis of acute decompensated heart failure (ADHF) in patients presenting with signs and symptoms of ADHF to the emergency department (ED). In addition, NT-proBNP of <300 pg/mL indicates ADHF is not likely.    Age Range Result Interpretation  NT-proBNP Concentration (pg/mL:      <50             Positive            >450                   Gray                 300-450                    Negative             <300    50-75           Positive            >900                  Gray                300-900                  Negative            <300      >75             Positive            >1800                  Gray                300-1800                  Negative            <300   SINGLE HS TROPONIN T - Abnormal; Notable for the following components:    HS Troponin T 23 (*)     All other components within normal limits    Narrative:     High Sensitive Troponin T Reference Range:  <14.0 ng/L- Negative Female for AMI  <22.0 ng/L- Negative Male for AMI  >=14 - Abnormal Female indicating possible myocardial injury.  >=22 - Abnormal Male indicating possible myocardial injury.   Clinicians would have to utilize clinical acumen, EKG, Troponin, and serial changes to determine if it is an Acute Myocardial Infarction or myocardial injury due to an underlying chronic condition.        CBC WITH AUTO DIFFERENTIAL - Abnormal; Notable for the following components:    WBC 12.77 (*)     RBC 3.43 (*)     Hemoglobin 9.4 (*)     Hematocrit 29.4 (*)     Neutrophil % 85.8 (*)     Lymphocyte % 7.9 (*)     Monocyte % 4.3 (*)     Immature Grans % 0.9 (*)     Neutrophils, Absolute 10.96 (*)     Immature Grans, Absolute 0.11 (*)     All other components within normal limits   RESPIRATORY PANEL PCR W/ COVID-19 (SARS-COV-2), NP SWAB IN UTM/VTP, 2 HR TAT - Normal    Narrative:     In the setting of a positive  "respiratory panel with a viral infection PLUS a negative procalcitonin without other underlying concern for bacterial infection, consider observing off antibiotics or discontinuation of antibiotics and continue supportive care. If the respiratory panel is positive for atypical bacterial infection (Bordetella pertussis, Chlamydophila pneumoniae, or Mycoplasma pneumoniae), consider antibiotic de-escalation to target atypical bacterial infection.   LACTIC ACID, PLASMA - Normal   PROCALCITONIN - Normal    Narrative:     As a Marker for Sepsis (Non-Neonates):    1. <0.5 ng/mL represents a low risk of severe sepsis and/or septic shock.  2. >2 ng/mL represents a high risk of severe sepsis and/or septic shock.    As a Marker for Lower Respiratory Tract Infections that require antibiotic therapy:    PCT on Admission    Antibiotic Therapy       6-12 Hrs later    >0.5                Strongly Recommended  >0.25 - <0.5        Recommended   0.1 - 0.25          Discouraged              Remeasure/reassess PCT  <0.1                Strongly Discouraged     Remeasure/reassess PCT    As 28 day mortality risk marker: \"Change in Procalcitonin Result\" (>80% or <=80%) if Day 0 (or Day 1) and Day 4 values are available. Refer to http://www.Fresh !s-pct-calculator.com    Change in PCT <=80%  A decrease of PCT levels below or equal to 80% defines a positive change in PCT test result representing a higher risk for 28-day all-cause mortality of patients diagnosed with severe sepsis for septic shock.    Change in PCT >80%  A decrease of PCT levels of more than 80% defines a negative change in PCT result representing a lower risk for 28-day all-cause mortality of patients diagnosed with severe sepsis or septic shock.      BLOOD CULTURE   BLOOD CULTURE   RAINBOW DRAW    Narrative:     The following orders were created for panel order [place] Draw.  Procedure                               Abnormality         Status                     ---------       "                         -----------         ------                     Green Top (Gel)[[id number]]                                  Final result               Lavender Top[[id number]]                                     Final result               Gold Top - SST[[id number]]                                   Final result               Light Blue Top[[id number]]                                   Final result                 Please view results for these tests on the individual orders.   CBC AND DIFFERENTIAL    Narrative:     The following orders were created for panel order CBC & Differential.  Procedure                               Abnormality         Status                     ---------                               -----------         ------                     CBC Auto Differential[[id number]]        Abnormal            Final result                 Please view results for these tests on the individual orders.   GREEN TOP   LAVENDER TOP   GOLD TOP - SST   LIGHT BLUE TOP       EKG:   ECG 12 Lead ED Triage Standing Order; SOA   Preliminary Result   HEART RATE= 95  bpm   RR Interval= 632  ms   KY Interval= 100  ms   P Horizontal Axis=   deg   P Front Axis= 91  deg   QRSD Interval= 90  ms   QT Interval= 346  ms   QTcB= 435  ms   QRS Axis= 74  deg   T Wave Axis= 81  deg   - ABNORMAL ECG -   Sinus tachycardia   Multiple premature complexes, vent & supraven   Short KY interval   RSR' in V1 or V2, right VCD or RVH   Nonspecific T abnrm, anterolateral leads   Electronically Signed By:    Date and Time of Study: 2024-03-31 21:25:36          Meds given in ED:   Medications   sodium chloride 0.9 % flush 10 mL (has no administration in time range)   levoFLOXacin (LEVAQUIN) 750 mg/150 mL D5W (premix) (LEVAQUIN) 750 mg (750 mg Intravenous New Bag 4/1/24 0152)   albuterol (PROVENTIL) nebulizer solution 0.083% 2.5 mg/3mL (2.5 mg Nebulization Given 4/1/24 0217)       Imaging results:  XR Chest 1 View    Result Date: 3/31/2024  Patchy  infiltrate suspected within the right midlung. There may be some additional involvement at the left lung base.  This report was finalized on 3/31/2024 10:29 PM by Dr. Leigh Parr M.D on Workstation: [id number]       Ambulatory status:   - 1 person assist    Social issues:   Social History     Socioeconomic History    Marital status:     Number of children: 2   Tobacco Use    Smoking status: Former     Current packs/day: 0.00     Average packs/day: 3.0 packs/day for 54.0 years (162.0 ttl pk-yrs)     Types: Cigarettes     Start date:      Quit date:      Years since quittin.2     Passive exposure: Past    Smokeless tobacco: Never    Tobacco comments:     caffeine - tea or mt dew    Vaping Use    Vaping status: Never Used   Substance and Sexual Activity    Alcohol use: No    Drug use: No    Sexual activity: Defer       Peripheral Neurovascular  Peripheral Neurovascular (Adult)  Peripheral Neurovascular WDL: WDL    Neuro Cognitive  Neuro Cognitive (Adult)  Cognitive/Neuro/Behavioral WDL: WDL    Learning  Learning Assessment (Adult)  Learning Readiness and Ability: no barriers identified    Respiratory  Respiratory WDL  Respiratory WDL: expansion/retractions  Expansion/Accessory Muscles/Retractions: no retractions  Breath Sounds  Breath Sounds: All Fields  All Lung Fields Breath Sounds: diminished    Abdominal Pain       Pain Assessments  Pain (Adult)  (0-10) Pain Rating: Rest: 0  (0-10) Pain Rating: Activity: 0    NIH Stroke Scale       Mercy Lopez RN  24 02:21 EDT

## 2024-04-01 NOTE — PROGRESS NOTES
Discharge Planning Assessment  [facility]     Patient Name: Paz Browne  MRN: [id number]  Today's Date: 4/1/2024    Admit Date: 3/31/2024    Plan: Home with spouse, follow for needs   Discharge Needs Assessment       Row Name 04/01/24 1319       Living Environment    People in Home spouse    Name(s) of People in Home Rick Beckham [phone]    Current Living Arrangements home    Primary Care Provided by self    Provides Primary Care For no one    Family Caregiver if Needed spouse    Quality of Family Relationships helpful;involved;supportive    Able to Return to Prior Arrangements yes       Resource/Environmental Concerns    Resource/Environmental Concerns none       Transition Planning    Patient/Family Anticipates Transition to home with family    Patient/Family Anticipated Services at Transition none    Transportation Anticipated family or friend will provide       Discharge Needs Assessment    Equipment Currently Used at Home wheelchair;wheelchair, motorized;oxygen    Concerns to be Addressed discharge planning    Discharge Coordination/Progress Home                   Discharge Plan       Row Name 04/01/24 1319       Plan    Plan Home with spouse, follow for needs    Patient/Family in Agreement with Plan yes    Plan Comments CCP following to assist with d/c planning. Pt resides with her spouse in a mobile home, uses wheelchair and motorized scooter outside the home as needed, and has home O2 but is unsure of supply company name. Pt has used [facility] in the past and has no SNF history. PT recommends home at d/c. CCP to assist should needs arise. Juanita Johnson LCSW                  Continued Care and Services - Admitted Since 3/31/2024    No active coordination exists for this encounter.       Expected Discharge Date and Time       Expected Discharge Date Expected Discharge Time    Apr 3, 2024            Demographic Summary       Row Name 04/01/24 1310       General Information    Admission Type observation     Arrived From home    Required Notices Provided Observation Status Notice    Referral Source admission list    Reason for Consult discharge planning    Preferred Language English                   Functional Status       Row Name 04/01/24 4953       Functional Status    Usual Activity Tolerance moderate    Current Activity Tolerance moderate       Functional Status, IADL    Medications assistive equipment and person    Meal Preparation assistive equipment and person    Housekeeping assistive equipment and person    Laundry assistive equipment and person    Shopping assistive equipment and person       Mental Status Summary    Recent Changes in Mental Status/Cognitive Functioning no changes                   Psychosocial    No documentation.                  Abuse/Neglect    No documentation.                  Legal    No documentation.                  Substance Abuse    No documentation.                  Patient Forms    No documentation.                     Linnea Johnson LCSW

## 2024-04-01 NOTE — ED PROVIDER NOTES
EMERGENCY DEPARTMENT ENCOUNTER  Room Number:  12/12  PCP: Javan Martinez MD  Independent Historians: Patient and Family      HPI:  Chief Complaint: had concerns including Shortness of Breath.     Context: Paz Browne is a 68 y.o. female with a medical history of A-fib, HTN, HLD who presents to the ED c/o acute shortness of breath, cough.  Patient states she recently completed a course of doxycycline for pneumonia and states she has had no improvement.  Patient continues to feel short of breath.  Patient is on 2 L O2 supplementation at all times and has had to titrate up to 3 L.  Patient has had intermittent fevers.      Review of prior external notes (non-ED) -and- Review of prior external test results outside of this encounter: Discharge summary from 3/6/2024 reviewed and notable for admission secondary to COPD exacerbation.  Consulting services included cardiology, pulmonology and infectious disease.  Antibiotics were discontinued at that time and patient had steroids stopped.  Patient O2 to transition down.  Patient eventually able to be discharged home.    PAST MEDICAL HISTORY  Active Ambulatory Problems     Diagnosis Date Noted    PAF (paroxysmal atrial fibrillation) 01/25/2016    Hypertension     Hyperlipidemia     Pain medication agreement 05/04/2016    Hiatal hernia 05/04/2016    Primary osteoarthritis involving multiple joints 05/04/2016    Pulmonary hypertension     S/P TVR (tricuspid valve repair) 07/07/2016    Pulmonary nodule 10/13/2016    Restless leg syndrome 11/18/2016    Chronic low back pain 08/02/2017    Dysplastic polyp of colon 08/07/2017    History of mitral valve replacement with tissue graft 08/11/2017    Chronic hypoxemic respiratory failure 08/13/2017    Anemia 08/09/2017    Celiac artery stenosis 08/24/2017    Intertrigo 08/25/2017    Abnormal EKG 06/30/2019    Muscle spasms of both lower extremities 12/22/2020    Iron deficiency anemia 03/30/2021    Adenomatous polyp of colon  03/30/2021    Gastroesophageal reflux disease without esophagitis 03/30/2021    Headache 07/04/2021    History of endocarditis 02/03/2022    Pneumonia of both lower lobes due to infectious organism 10/16/2022    Peptic ulcer disease 10/17/2022    Peripheral vascular disease 10/17/2022    Hyperlipidemia 10/17/2022    Hyperglycemia 10/17/2022    COPD with acute exacerbation 10/19/2022    Chronic diastolic CHF (congestive heart failure) 03/29/2023    Chronic bilateral low back pain 04/19/2023    COPD exacerbation 11/17/2023    Leukocytosis 03/02/2024    Elevated troponin 03/02/2024     Resolved Ambulatory Problems     Diagnosis Date Noted    Tachycardia     Renal failure     Palpitations     Mitral regurgitation     Heart failure 06/08/2016    Long term current use of anticoagulant 10/13/2016    Aspiration pneumonia 10/14/2016    Hemoptysis 10/14/2016    Lower GI bleed 08/09/2017    Precordial pain 08/11/2017    Oral candidiasis 08/14/2017    VAN (acute kidney injury) 08/15/2017    GI bleed 12/01/2017    Acute exacerbation of chronic obstructive pulmonary disease (COPD) 01/02/2018    Pneumonia due to infectious organism 07/18/2018    Insomnia due to medical condition 02/01/2019    COPD (chronic obstructive pulmonary disease) 06/30/2019    Shingles 04/08/2020    Vertigo 10/06/2020    Dark stools 03/30/2021    Acute hyperkalemia 05/10/2021    Tremor 05/10/2021    Anemia 05/10/2021    COPD exacerbation 06/14/2021    Septicemia 07/02/2021    Bacteremia 07/03/2021    Acute hypoxemic respiratory failure 10/17/2022    COPD (chronic obstructive pulmonary disease) 10/17/2022    Pulmonary nodule 10/17/2022    Hypokalemia 10/17/2022    Chronic respiratory failure 10/19/2022     Past Medical History:   Diagnosis Date    Asthma     Atrial flutter     Cataract     Chronic respiratory failure with hypoxia     Colon polyp     History of CHF (congestive heart failure)     History of home oxygen therapy     Infectious viral hepatitis      Long term (current) use of anticoagulants     Pneumonia          PAST SURGICAL HISTORY  Past Surgical History:   Procedure Laterality Date    CARDIAC CATHETERIZATION  09/01/2014    Right dominant systemt, normal coronary arteries.     CARDIAC CATHETERIZATION Left 6/10/2016    Procedure: Cardiac catheterization;  Surgeon: Sergei Hall MD;  Location: [facility] CATH INVASIVE LOCATION;  Service:     CARDIAC CATHETERIZATION N/A 6/10/2016    Procedure: Right Heart Cath;  Surgeon: Sergei Hall MD;  Location: [facility] CATH INVASIVE LOCATION;  Service:     CATARACT EXTRACTION      COLONOSCOPY      COLONOSCOPY N/A 8/4/2017    Procedure: COLONOSCOPY TO CECUM/TI WITH POLYPECTOMY ( COLD BX);  Surgeon: Cleveland Devine MD;  Location: [facility] ENDOSCOPY;  Service:     COLONOSCOPY N/A 8/10/2017    Procedure: COLONOSCOPY to cecum and TI with 2 clips placed at transverse;  Surgeon: Earnest PALOMO MD;  Location: [facility] ENDOSCOPY;  Service:     COLONOSCOPY N/A 12/22/2017    Procedure: COLONOSCOPY INTO CECUM WITH COLD POLYPECTOMIES;  Surgeon: Cleveland Devine MD;  Location: [facility] ENDOSCOPY;  Service:     COLONOSCOPY N/A 5/17/2021    Procedure: COLONOSCOPY to cecum;  Surgeon: Cleveland Devine MD;  Location: [facility] ENDOSCOPY;  Service: Gastroenterology;  Laterality: N/A;  Pre: Fe deficency anemia, h/x of polyps  Post: fair prep, normal    ENDOSCOPY N/A 8/17/2017    Procedure: ESOPHAGOGASTRODUODENOSCOPY;  Surgeon: Porsha Ruby MD;  Location: [facility] ENDOSCOPY;  Service:     ENDOSCOPY N/A 12/22/2017    Procedure: ESOPHAGOGASTRODUODENOSCOPY WITH BIOPSIES;  Surgeon: Cleveland Devine MD;  Location: [facility] ENDOSCOPY;  Service:     ENDOSCOPY N/A 5/17/2021    Procedure: ESOPHAGOGASTRODUODENOSCOPY  with biopsies;  Surgeon: Cleveland Devine MD;  Location: [facility] ENDOSCOPY;  Service: Gastroenterology;  Laterality: N/A;  Pre: Fe deficency anemia, nausea, heme positive stool   Post: gastritis, sloughing of distal esophagus mucosa    GALLBLADDER SURGERY       HEMORRHOIDECTOMY      HYSTERECTOMY      KIDNEY SURGERY  2013    Stent placement    MAZE PROCEDURE      MITRAL VALVE REPLACEMENT      TONSILLECTOMY      TRICUSPID VALVE REPLACEMENT           FAMILY HISTORY  Family History   Adopted: Yes   Problem Relation Age of Onset    No Known Problems Mother     No Known Problems Father          SOCIAL HISTORY  Social History     Socioeconomic History    Marital status:     Number of children: 2   Tobacco Use    Smoking status: Former     Current packs/day: 0.00     Average packs/day: 3.0 packs/day for 54.0 years (162.0 ttl pk-yrs)     Types: Cigarettes     Start date:      Quit date:      Years since quittin.2     Passive exposure: Past    Smokeless tobacco: Never    Tobacco comments:     caffeine - tea or mt dew    Vaping Use    Vaping status: Never Used   Substance and Sexual Activity    Alcohol use: No    Drug use: No    Sexual activity: Defer         ALLERGIES  Bupropion, Cephalexin, and Metoprolol      REVIEW OF SYSTEMS  Review of Systems  Included in HPI  All systems reviewed and negative except for those discussed in HPI.      PHYSICAL EXAM    I have reviewed the triage vital signs and nursing notes.    ED Triage Vitals   Temp Heart Rate Resp BP SpO2   248 24   97.3 °F (36.3 °C) 94 18 158/81 91 %      Temp src Heart Rate Source Patient Position BP Location FiO2 (%)   -- -- -- -- --              Physical Exam  GENERAL: alert, no acute distress, chronically ill-appearing   SKIN: Warm, dry  HENT: Normocephalic, atraumatic  EYES: no scleral icterus  CV: regular rhythm, regular rate  RESPIRATORY: normal effort, coarse breath sounds bilaterally, on supplementary O2  ABDOMEN: soft, nontender, nondistended  MUSCULOSKELETAL: no deformity  NEURO: alert, moves all extremities, follows commands    LAB RESULTS  Recent Results (from the past 24 hour(s))   ECG 12 Lead ED Triage Standing Order; SOA     Collection Time: 03/31/24  9:25 PM   Result Value Ref Range    QT Interval 346 ms    QTC Interval 435 ms   Comprehensive Metabolic Panel    Collection Time: 03/31/24  9:27 PM    Specimen: Blood   Result Value Ref Range    Glucose 123 (H) 65 - 99 mg/dL    BUN 9 8 - 23 mg/dL    Creatinine 0.76 0.57 - 1.00 mg/dL    Sodium 141 136 - 145 mmol/L    Potassium 4.1 3.5 - 5.2 mmol/L    Chloride 101 98 - 107 mmol/L    CO2 30.0 (H) 22.0 - 29.0 mmol/L    Calcium 9.7 8.6 - 10.5 mg/dL    Total Protein 6.6 6.0 - 8.5 g/dL    Albumin 3.6 3.5 - 5.2 g/dL    ALT (SGPT) 20 1 - 33 U/L    AST (SGOT) 19 1 - 32 U/L    Alkaline Phosphatase 94 39 - 117 U/L    Total Bilirubin 0.6 0.0 - 1.2 mg/dL    Globulin 3.0 gm/dL    A/G Ratio 1.2 g/dL    BUN/Creatinine Ratio 11.8 7.0 - 25.0    Anion Gap 10.0 5.0 - 15.0 mmol/L    eGFR 84.4 >60.0 mL/min/1.73   BNP    Collection Time: 03/31/24  9:27 PM    Specimen: Blood   Result Value Ref Range    proBNP 2,009.0 (H) 0.0 - 900.0 pg/mL   Single High Sensitivity Troponin T    Collection Time: 03/31/24  9:27 PM    Specimen: Blood   Result Value Ref Range    HS Troponin T 23 (H) <14 ng/L   Green Top (Gel)    Collection Time: 03/31/24  9:27 PM   Result Value Ref Range    Extra Tube Hold for add-ons.    Lavender Top    Collection Time: 03/31/24  9:27 PM   Result Value Ref Range    Extra Tube hold for add-on    Gold Top - SST    Collection Time: 03/31/24  9:27 PM   Result Value Ref Range    Extra Tube Hold for add-ons.    Light Blue Top    Collection Time: 03/31/24  9:27 PM   Result Value Ref Range    Extra Tube Hold for add-ons.    CBC Auto Differential    Collection Time: 03/31/24  9:27 PM    Specimen: Blood   Result Value Ref Range    WBC 12.77 (H) 3.40 - 10.80 10*3/mm3    RBC 3.43 (L) 3.77 - 5.28 10*6/mm3    Hemoglobin 9.4 (L) 12.0 - 15.9 g/dL    Hematocrit 29.4 (L) 34.0 - 46.6 %    MCV 85.7 79.0 - 97.0 fL    MCH 27.4 26.6 - 33.0 pg    MCHC 32.0 31.5 - 35.7 g/dL    RDW 14.5 12.3 - 15.4 %    RDW-SD 44.9 37.0 -  54.0 fl    MPV 10.0 6.0 - 12.0 fL    Platelets 268 140 - 450 10*3/mm3    Neutrophil % 85.8 (H) 42.7 - 76.0 %    Lymphocyte % 7.9 (L) 19.6 - 45.3 %    Monocyte % 4.3 (L) 5.0 - 12.0 %    Eosinophil % 0.9 0.3 - 6.2 %    Basophil % 0.2 0.0 - 1.5 %    Immature Grans % 0.9 (H) 0.0 - 0.5 %    Neutrophils, Absolute 10.96 (H) 1.70 - 7.00 10*3/mm3    Lymphocytes, Absolute 1.01 0.70 - 3.10 10*3/mm3    Monocytes, Absolute 0.55 0.10 - 0.90 10*3/mm3    Eosinophils, Absolute 0.11 0.00 - 0.40 10*3/mm3    Basophils, Absolute 0.03 0.00 - 0.20 10*3/mm3    Immature Grans, Absolute 0.11 (H) 0.00 - 0.05 10*3/mm3    nRBC 0.0 0.0 - 0.2 /100 WBC   Procalcitonin    Collection Time: 03/31/24  9:27 PM    Specimen: Blood   Result Value Ref Range    Procalcitonin 0.10 0.00 - 0.25 ng/mL   Respiratory Panel PCR w/COVID-19(SARS-CoV-2) KAJAL/MARILIN/DEBBIE/PAD/COR/DAVID In-House, NP Swab in UTM/VTM, 2 HR TAT - Swab, Nasopharynx    Collection Time: 03/31/24 10:22 PM    Specimen: Nasopharynx; Swab   Result Value Ref Range    ADENOVIRUS, PCR Not Detected Not Detected    Coronavirus 229E Not Detected Not Detected    Coronavirus HKU1 Not Detected Not Detected    Coronavirus NL63 Not Detected Not Detected    Coronavirus OC43 Not Detected Not Detected    COVID19 Not Detected Not Detected - Ref. Range    Human Metapneumovirus Not Detected Not Detected    Human Rhinovirus/Enterovirus Not Detected Not Detected    Influenza A PCR Not Detected Not Detected    Influenza B PCR Not Detected Not Detected    Parainfluenza Virus 1 Not Detected Not Detected    Parainfluenza Virus 2 Not Detected Not Detected    Parainfluenza Virus 3 Not Detected Not Detected    Parainfluenza Virus 4 Not Detected Not Detected    RSV, PCR Not Detected Not Detected    Bordetella pertussis pcr Not Detected Not Detected    Bordetella parapertussis PCR Not Detected Not Detected    Chlamydophila pneumoniae PCR Not Detected Not Detected    Mycoplasma pneumo by PCR Not Detected Not Detected   Lactic Acid,  Plasma    Collection Time: 03/31/24 10:23 PM    Specimen: Blood   Result Value Ref Range    Lactate 0.8 0.5 - 2.0 mmol/L         RADIOLOGY  XR Chest 1 View    Result Date: 3/31/2024  SINGLE VIEW OF THE CHEST  HISTORY: Shortness of breath  COMPARISON: March 23, 2024  FINDINGS: There is cardiomegaly. Patient is status post median sternotomy. There are background emphysematous changes. When compared to prior study, patient appears to have some patchy infiltrate within the right midlung. There may be some additional mild consolidation at the left lung base. No pneumothorax or large effusion is seen.      Patchy infiltrate suspected within the right midlung. There may be some additional involvement at the left lung base.  This report was finalized on 3/31/2024 10:29 PM by Dr. Leigh Parr M.D on Workstation: [id number]         MEDICATIONS GIVEN IN ER  Medications   sodium chloride 0.9 % flush 10 mL (has no administration in time range)   levoFLOXacin (LEVAQUIN) 750 mg/150 mL D5W (premix) (LEVAQUIN) 750 mg (750 mg Intravenous New Bag 4/1/24 0152)   albuterol (PROVENTIL) nebulizer solution 0.083% 2.5 mg/3mL (has no administration in time range)         ORDERS PLACED DURING THIS VISIT:  Orders Placed This Encounter   Procedures    Respiratory Panel PCR w/COVID-19(SARS-CoV-2) KAJAL/MARILIN/DEBBIE/PAD/COR/DAVID In-House, NP Swab in UTM/VTM, 2 HR TAT - Swab, Nasopharynx    Blood Culture - Blood,    Blood Culture - Blood,    XR Chest 1 View    [place] Draw    Comprehensive Metabolic Panel    BNP    Single High Sensitivity Troponin T    CBC Auto Differential    Lactic Acid, Plasma    Procalcitonin    NPO Diet NPO Type: Strict NPO    Undress & Gown    Continuous Pulse Oximetry    Vital Signs    [facility] (on-call MD unless specified) Details    Oxygen Therapy- Nasal Cannula; Titrate 1-6 LPM Per SpO2; 90 - 95%    ECG 12 Lead ED Triage Standing Order; SOA    Insert Peripheral IV    Initiate Observation Status    CBC & Differential    Green  Top (Gel)    Lavender Top    Gold Top - SST    Light Blue Top         OUTPATIENT MEDICATION MANAGEMENT:  Current Facility-Administered Medications Ordered in Epic   Medication Dose Route Frequency Provider Last Rate Last Admin    albuterol (PROVENTIL) nebulizer solution 0.083% 2.5 mg/3mL  2.5 mg Nebulization Q15 Min Jose Luis Bella MD        levoFLOXacin (LEVAQUIN) 750 mg/150 mL D5W (premix) (LEVAQUIN) 750 mg  750 mg Intravenous Once Jose Luis Bella  mL/hr at 04/01/24 0152 750 mg at 04/01/24 0152    sodium chloride 0.9 % flush 10 mL  10 mL Intravenous PRN Magdi Lucio MD         Current Outpatient Medications Ordered in Epic   Medication Sig Dispense Refill    albuterol sulfate  (90 Base) MCG/ACT inhaler Inhale 2 puffs Every 4 (Four) Hours As Needed for Wheezing. 18 g 3    amLODIPine (NORVASC) 10 MG tablet TAKE 1 TABLET BY MOUTH EVERY DAY 90 tablet 1    apixaban (ELIQUIS) 5 MG tablet tablet Take 1 tablet by mouth Every 12 (Twelve) Hours. Indications: Atrial Fibrillation 180 tablet 3    atorvastatin (LIPITOR) 40 MG tablet TAKE 1 TABLET BY MOUTH EVERY DAY 90 tablet 2    budesonide (PULMICORT) 0.5 MG/2ML nebulizer solution Take 2 mL by nebulization 2 (Two) Times a Day for 30 days. Indications: Chronic Obstructive Lung Disease, Chronic hypoxic respiratory failure 360 mL 3    carvedilol (COREG) 6.25 MG tablet TAKE 1 TABLET BY MOUTH TWICE A DAY WITH MEALS 180 tablet 2    cyclobenzaprine (FLEXERIL) 10 MG tablet TAKE 1 TABLET BY MOUTH EVERYDAY AT BEDTIME 90 tablet 3    docosanol (ABREVA) 10 % cream cream Apply 1 Application topically to the appropriate area as directed 5 (Five) Times a Day. 2 g 0    furosemide (LASIX) 40 MG tablet TAKE 1 TABLET BY MOUTH TWICE A  tablet 1    HYDROcodone-acetaminophen (NORCO) 7.5-325 MG per tablet Take 1 tablet by mouth Every 6 (Six) Hours As Needed for Moderate Pain (Chronic pain medicine for low back pain). 90 tablet 0    ipratropium-albuterol (DUO-NEB) 0.5-2.5  mg/3 ml nebulizer Take 3 mL by nebulization Every 4 (Four) Hours As Needed for Wheezing. 360 mL 3    lisinopril (PRINIVIL,ZESTRIL) 40 MG tablet Take 1 tablet by mouth Daily.      loratadine (CLARITIN) 10 MG tablet Take 1 tablet by mouth 2 (two) times a day.      Magnesium Oxide 400 (240 Mg) MG tablet TAKE 1 TABLET BY MOUTH EVERY DAY 90 tablet 1    Multiple Vitamins-Minerals (MULTIVITAL) tablet Take 1 tablet by mouth Daily.      Nebulizer device 1 Units 4 (Four) Times a Day As Needed (Wheezing). J44.1 j20.8 1 each 0    O2 (OXYGEN) Inhale 2 L/min Continuous.      Omega-3 Fatty Acids (fish oil) 1000 MG capsule capsule Take  by mouth Every Night.      omeprazole (priLOSEC) 40 MG capsule TAKE 1 CAPSULE BY MOUTH EVERY DAY 90 capsule 1    ondansetron ODT (ZOFRAN-ODT) 4 MG disintegrating tablet Place 1 tablet on the tongue Every 8 (Eight) Hours As Needed for Nausea or Vomiting. 10 tablet 0    polyethylene glycol (MIRALAX) packet Take 17 g by mouth 2 (Two) Times a Day.      potassium chloride (K-DUR,KLOR-CON) 20 MEQ CR tablet 1 po tid 60 tablet 0    roflumilast (DALIRESP) 500 MCG tablet tablet Take 1 tablet by mouth Daily. 90 tablet 1    rOPINIRole (REQUIP) 2 MG tablet TAKE 1 TABLET BY MOUTH EVERY DAY AT NIGHT 90 tablet 2    sertraline (ZOLOFT) 100 MG tablet TAKE 1 TABLET BY MOUTH EVERY DAY 90 tablet 1    zolpidem (AMBIEN) 10 MG tablet Take 1 tablet by mouth At Night As Needed for Sleep. 30 tablet 3         PROGRESS, DATA ANALYSIS, CONSULTS, AND MEDICAL DECISION MAKING  All labs have been independently interpreted by me.  All radiology studies have been reviewed by me. All EKG's have been independently viewed and interpreted by me.  Discussion below represents my analysis of pertinent findings related to patient's condition, differential diagnosis, treatment plan and final disposition.    Differential diagnosis includes but is not limited to COPD exacerbation, pneumonia, PE.    Clinical Scores:                   ED Course as  of 04/01/24 0210   Sun Mar 31, 2024   2322 Chest x-ray interpreted by me and demonstrates consolidation of the right midlung [MW]   2322 WBC(!): 12.77 [MW]   2322 proBNP(!): 2,009.0 [MW]   2322 HS Troponin T(!): 23 [MW]   2322 EKG interpreted by me demonstrates sinus rhythm, rate 95, RI/QT prolongation, no ST elevation [MW]   Mon Apr 01, 2024 0210 Workup in the emergency department does demonstrate leukocytosis as well as elevation of BNP.  Chest x-ray is concerning for right midlung consolidation.  We will reinitiate IV antibiotics with Levaquin as patient has a cephalosporin allergy.  Will treat with DuoNebs and admit for further evaluation and management. [MW]   0210 Discussed with Rashmi YATES with [facility] who agrees to admit [MW]      ED Course User Index  [MW] Jose Luis Bella MD             AS OF 02:10 EDT VITALS:    BP - 154/75  HR - 115  TEMP - 100.2 °F (37.9 °C) (Tympanic)  O2 SATS - 95%    COMPLEXITY OF CARE  The patient requires admission.      DIAGNOSIS  Final diagnoses:   Pneumonia due to infectious organism, unspecified laterality, unspecified part of lung         DISPOSITION  ED Disposition       ED Disposition   Decision to Admit    Condition   --    Comment   Level of Care: Telemetry [5]   Diagnosis: Pneumonia [917089]   Admitting Physician: DAREN VERNON [[id number]]   Attending Physician: DAREN VERNON [[id number]]                  Please note that portions of this document were completed with a voice recognition program.    Note Disclaimer: At [facility], we believe that sharing information builds trust and better relationships. You are receiving this note because you recently visited [facility]. It is possible you will see health information before a provider has talked with you about it. This kind of information can be easy to misunderstand. To help you fully understand what it means for your health, we urge you to discuss this note with your provider.         Jose Luis Bella MD  04/01/24  [id number]

## 2024-04-01 NOTE — PLAN OF CARE
Goal Outcome Evaluation:  Plan of Care Reviewed With: patient, family           Outcome Evaluation: Pt is a 69 yo female admitted to [facility] with SOA and cough. Pt with hx of copd, A fib, chf, htn. Workup for PNA. Pt lives with spouse in a trailer home. She reports use of oxygen at home, no AD and denies any falls. Pt reports she typically is independent with household mobility and uses a scooter for community mobility. Today, pt sitting at EOB with breakfast. She stood with supervisionand ambulated 15' without AD. Limited by feeling SOA but no overt balance deficits observed. Pt may benefit from ongoing PT services in the acute setting to address endurance deficits. Anticipate home at d/c.       Anticipated Discharge Disposition (PT): home, home with assist

## 2024-04-01 NOTE — H&P
HISTORY AND PHYSICAL   [facility]        Patient Identification:  Name: Paz Browne  Age: 68 y.o.  Sex: female  :  1955  MRN: [id number]                     Primary Care Physician: Javan Martinez MD    Chief Complaint: Short of air    History of Present Illness:   Pleasant 68-year-old female with a history of COPD with chronic hypoxic respiratory failure normally requiring 2 L nasal cannula.  She presents to the emergency room with recurrent shortness of air.  She was in the hospital last month with same complaints and treated for pneumonia.  She did return to the emergency room a few weeks later again complaining of shortness of air and treated with another round of antibiotics.  She presents again yesterday and once again feeling short of air.  She has not noted any increasing cough.  No sputum production.  No chest pain.  At present she is actually feeling better.  She has noted a low-grade fever and chills.      Past Medical History:  Past Medical History:   Diagnosis Date    VAN (acute kidney injury)     Anemia     Asthma     Atrial flutter     cardioversion    Cataract     Celiac artery stenosis     Chronic respiratory failure with hypoxia     Colon polyp     COPD (chronic obstructive pulmonary disease)     GI bleed     Hiatal hernia     History of CHF (congestive heart failure)     due to MR    History of home oxygen therapy     3 lpm NC    History of mitral valve replacement with tissue graft     Hyperlipidemia     Hypertension     Infectious viral hepatitis     B    Intertrigo     Long term (current) use of anticoagulants     Mitral regurgitation     s/p tissue MVR    PAF (paroxysmal atrial fibrillation)     s/p MAZE    Pneumonia     Pulmonary hypertension     S/P TVR (tricuspid valve repair) 2016     Past Surgical History:  Past Surgical History:   Procedure Laterality Date    CARDIAC CATHETERIZATION  2014    Right dominant systemt, normal coronary arteries.      CARDIAC CATHETERIZATION Left 6/10/2016    Procedure: Cardiac catheterization;  Surgeon: Sergei Hall MD;  Location: [facility] CATH INVASIVE LOCATION;  Service:     CARDIAC CATHETERIZATION N/A 6/10/2016    Procedure: Right Heart Cath;  Surgeon: Sergei Hall MD;  Location: [facility] CATH INVASIVE LOCATION;  Service:     CATARACT EXTRACTION      COLONOSCOPY      COLONOSCOPY N/A 8/4/2017    Procedure: COLONOSCOPY TO CECUM/TI WITH POLYPECTOMY ( COLD BX);  Surgeon: Cleveland Devine MD;  Location: [facility] ENDOSCOPY;  Service:     COLONOSCOPY N/A 8/10/2017    Procedure: COLONOSCOPY to cecum and TI with 2 clips placed at transverse;  Surgeon: Earnest PALOMO MD;  Location: [facility] ENDOSCOPY;  Service:     COLONOSCOPY N/A 12/22/2017    Procedure: COLONOSCOPY INTO CECUM WITH COLD POLYPECTOMIES;  Surgeon: Cleveland Devine MD;  Location: [facility] ENDOSCOPY;  Service:     COLONOSCOPY N/A 5/17/2021    Procedure: COLONOSCOPY to cecum;  Surgeon: Cleveland Devine MD;  Location: [facility] ENDOSCOPY;  Service: Gastroenterology;  Laterality: N/A;  Pre: Fe deficency anemia, h/x of polyps  Post: fair prep, normal    ENDOSCOPY N/A 8/17/2017    Procedure: ESOPHAGOGASTRODUODENOSCOPY;  Surgeon: Porsha Ruby MD;  Location: [facility] ENDOSCOPY;  Service:     ENDOSCOPY N/A 12/22/2017    Procedure: ESOPHAGOGASTRODUODENOSCOPY WITH BIOPSIES;  Surgeon: Cleveland Devine MD;  Location: [facility] ENDOSCOPY;  Service:     ENDOSCOPY N/A 5/17/2021    Procedure: ESOPHAGOGASTRODUODENOSCOPY  with biopsies;  Surgeon: Cleveland Devine MD;  Location: [facility] ENDOSCOPY;  Service: Gastroenterology;  Laterality: N/A;  Pre: Fe deficency anemia, nausea, heme positive stool   Post: gastritis, sloughing of distal esophagus mucosa    GALLBLADDER SURGERY      HEMORRHOIDECTOMY      HYSTERECTOMY      KIDNEY SURGERY  04/22/2013    Stent placement    MAZE PROCEDURE      MITRAL VALVE REPLACEMENT      TONSILLECTOMY      TRICUSPID VALVE REPLACEMENT        Home Meds:  Medications Prior to Admission    Medication Sig Dispense Refill Last Dose    albuterol sulfate  (90 Base) MCG/ACT inhaler Inhale 2 puffs Every 4 (Four) Hours As Needed for Wheezing. 18 g 3 3/31/2024    amLODIPine (NORVASC) 10 MG tablet TAKE 1 TABLET BY MOUTH EVERY DAY 90 tablet 1 3/31/2024    apixaban (ELIQUIS) 5 MG tablet tablet Take 1 tablet by mouth Every 12 (Twelve) Hours. Indications: Atrial Fibrillation 180 tablet 3 3/31/2024    atorvastatin (LIPITOR) 40 MG tablet TAKE 1 TABLET BY MOUTH EVERY DAY 90 tablet 2 3/31/2024    carvedilol (COREG) 6.25 MG tablet TAKE 1 TABLET BY MOUTH TWICE A DAY WITH MEALS 180 tablet 2 3/31/2024    cyclobenzaprine (FLEXERIL) 10 MG tablet TAKE 1 TABLET BY MOUTH EVERYDAY AT BEDTIME 90 tablet 3 3/31/2024    furosemide (LASIX) 40 MG tablet TAKE 1 TABLET BY MOUTH TWICE A  tablet 1 3/31/2024    HYDROcodone-acetaminophen (NORCO) 7.5-325 MG per tablet Take 1 tablet by mouth Every 6 (Six) Hours As Needed for Moderate Pain (Chronic pain medicine for low back pain). 90 tablet 0 3/31/2024    ipratropium-albuterol (DUO-NEB) 0.5-2.5 mg/3 ml nebulizer Take 3 mL by nebulization Every 4 (Four) Hours As Needed for Wheezing. 360 mL 3 3/31/2024    lisinopril (PRINIVIL,ZESTRIL) 40 MG tablet Take 1 tablet by mouth Daily.   3/31/2024    loratadine (CLARITIN) 10 MG tablet Take 1 tablet by mouth 2 (two) times a day.   3/31/2024    Magnesium Oxide 400 (240 Mg) MG tablet TAKE 1 TABLET BY MOUTH EVERY DAY 90 tablet 1 3/31/2024    Multiple Vitamins-Minerals (MULTIVITAL) tablet Take 1 tablet by mouth Daily.   3/31/2024    O2 (OXYGEN) Inhale 2 L/min Continuous.   4/1/2024    Omega-3 Fatty Acids (fish oil) 1000 MG capsule capsule Take  by mouth Every Night.   3/31/2024    omeprazole (priLOSEC) 40 MG capsule TAKE 1 CAPSULE BY MOUTH EVERY DAY 90 capsule 1 3/31/2024    ondansetron ODT (ZOFRAN-ODT) 4 MG disintegrating tablet Place 1 tablet on the tongue Every 8 (Eight) Hours As Needed for Nausea or Vomiting. 10 tablet 0 Past Week     polyethylene glycol (MIRALAX) packet Take 17 g by mouth 2 (Two) Times a Day. (Patient taking differently: Take 17 g by mouth Every 12 (Twelve) Hours As Needed.)   Past Week    potassium chloride (K-DUR,KLOR-CON) 20 MEQ CR tablet 1 po tid 60 tablet 0 3/31/2024    roflumilast (DALIRESP) 500 MCG tablet tablet Take 1 tablet by mouth Daily. 90 tablet 1 3/31/2024    rOPINIRole (REQUIP) 2 MG tablet TAKE 1 TABLET BY MOUTH EVERY DAY AT NIGHT 90 tablet 2 3/31/2024    sertraline (ZOLOFT) 100 MG tablet TAKE 1 TABLET BY MOUTH EVERY DAY 90 tablet 1 3/31/2024    zolpidem (AMBIEN) 10 MG tablet Take 1 tablet by mouth At Night As Needed for Sleep. 30 tablet 3 3/31/2024    budesonide (PULMICORT) 0.5 MG/2ML nebulizer solution Take 2 mL by nebulization 2 (Two) Times a Day for 30 days. Indications: Chronic Obstructive Lung Disease, Chronic hypoxic respiratory failure 360 mL 3     docosanol (ABREVA) 10 % cream cream Apply 1 Application topically to the appropriate area as directed 5 (Five) Times a Day. 2 g 0     Nebulizer device 1 Units 4 (Four) Times a Day As Needed (Wheezing). J44.1 j20.8 1 each 0        Allergies:  Allergies   Allergen Reactions    Bupropion Itching    Cephalexin Itching     Tolerated piperacillin/tazobactam, ampicillin, cefdinir, and ceftriaxone    Metoprolol Itching     Immunizations:  Immunization History   Administered Date(s) Administered    COVID-19 (PFIZER) BIVALENT 12+YRS 03/24/2023    COVID-19 (PFIZER) Purple Cap Monovalent 03/04/2021, 03/25/2021, 11/19/2021    Covid-19 (Pfizer) Gray Cap Monovalent 06/17/2022    FLUAD TRI 65YR+ 10/31/2016, 09/01/2018, 09/09/2019    FluMist 2-49yrs 10/30/2015    Fluad Quad 65+ 12/12/2020, 01/09/2021    Fluzone High Dose =>65 Years ([employer] ONLY) 09/06/2017, 10/23/2018, 11/19/2021    Fluzone High-Dose 65+yrs 11/19/2021    Pneumococcal Conjugate 13-Valent (PCV13) 11/18/2016    Pneumococcal Polysaccharide (PPSV23) 10/30/2015    Tdap 11/18/2016     Social History:   Social History      Social History Narrative    Not on file     Social History     Tobacco Use    Smoking status: Former     Current packs/day: 0.00     Average packs/day: 3.0 packs/day for 54.0 years (162.0 ttl pk-yrs)     Types: Cigarettes     Start date:      Quit date:      Years since quittin.2     Passive exposure: Past    Smokeless tobacco: Never    Tobacco comments:     caffeine - tea or mt dew    Substance Use Topics    Alcohol use: No     Family History:  Family History   Adopted: Yes   Problem Relation Age of Onset    No Known Problems Mother     No Known Problems Father         Review of Systems  Review of Systems   Constitutional:         As per history of present illness.  Otherwise negative.   HENT:          She has developed a mild nosebleed from the left nostril since admission.   Eyes: Negative.    Respiratory:          As per history of present illness.   Cardiovascular: Negative.    Gastrointestinal: Negative.    Endocrine: Negative.    Genitourinary: Negative.    Musculoskeletal: Negative.    Skin: Negative.    Allergic/Immunologic: Negative.    Neurological: Negative.    Hematological: Negative.    Psychiatric/Behavioral: Negative.         Objective:  T Max 24 hrs: Temp (24hrs), Av.8 °F (37.1 °C), Min:97.3 °F (36.3 °C), Max:100.2 °F (37.9 °C)    Vitals Ranges:   Temp:  [97.3 °F (36.3 °C)-100.2 °F (37.9 °C)] 98.6 °F (37 °C)  Heart Rate:  [] 106  Resp:  [18-24] 20  BP: (122-174)/() 122/99      Exam:  Physical Exam  Constitutional:       General: She is not in acute distress.     Appearance: She is normal weight. She is not ill-appearing, toxic-appearing or diaphoretic.   HENT:      Head: Normocephalic and atraumatic.      Right Ear: External ear normal.      Left Ear: External ear normal.      Nose:      Comments: Small amount of bright red blood on her Kleenex that she dabs her left nostril.  Eyes:      General: No scleral icterus.        Right eye: No discharge.         Left eye:  No discharge.      Extraocular Movements: Extraocular movements intact.      Conjunctiva/sclera: Conjunctivae normal.   Cardiovascular:      Rate and Rhythm: Rhythm irregular.      Heart sounds:      No gallop.      Comments: Heart rate highly variable.  Appears to be averaging in the low to mid 90s.  Pulmonary:      Effort: Pulmonary effort is normal. No respiratory distress.      Breath sounds: Normal breath sounds.      Comments: The lungs are clear to auscultation.  Kyphosis is also present.  Overall air movement likely chronically suboptimal.  Abdominal:      General: Abdomen is flat. Bowel sounds are normal.      Palpations: Abdomen is soft.      Tenderness: There is no abdominal tenderness. There is no guarding.   Musculoskeletal:         General: No swelling.      Cervical back: Normal range of motion and neck supple.      Right lower leg: No edema.      Left lower leg: No edema.   Skin:     General: Skin is warm and dry.   Neurological:      General: No focal deficit present.      Mental Status: She is alert and oriented to person, place, and time. Mental status is at baseline.   Psychiatric:         Mood and Affect: Mood normal.         Behavior: Behavior normal.         Thought Content: Thought content normal.         Judgment: Judgment normal.         Data Review:  All labs and radiology reviewed.    Assessment:  COPD exacerbation: Presently I do not appreciate an active pneumonia.  Chest x-ray changes are very subtle.  Procalcitonin is well within normal limits.  She had 1 very brief borderline fever.  She appears to have a fairly persistent leukocytosis.  She is recently had 2 courses of antibiotics.  At this point I will keep her of antibiotics on hold but will also consult her pulmonologist concerning this as well as ensuring close postdischarge follow-up to avoid further ER visits.  Continue bronchodilators.  Steroids do not appear to necessary at this point.  Chronic hypoxic respiratory failure:  Appears to be at baseline at present.  Paroxysmal atrial fibrillation: Rate reasonably controlled.  Continue rate control and anticoagulants.  Hypertension: Continue home meds.  Monitor.  Persistent calcified vegetation on bioprosthetic mitral valve: No evidence of active infection.      Plan:  Please see above.  Assessment and plans discussed with patient.  Discharge when okay with pulmonology.    Gil Nicole MD  4/1/2024  09:09 EDT    Addendum:  Blood cultures w 2/2  G+ in pairs.  Antibiotics resumed.      EMR Dragon/Transcription disclaimer:   Much of this encounter note is an electronic transcription/translation of spoken language to printed text. The electronic translation of spoken language may permit erroneous, or at times, nonsensical words or phrases to be inadvertently transcribed; Although I have reviewed the note for such errors, some may still exist.

## 2024-04-02 ENCOUNTER — APPOINTMENT (OUTPATIENT)
Dept: GENERAL RADIOLOGY | Facility: HOSPITAL | Age: 69
DRG: 314 | End: 2024-04-02
Payer: MEDICARE

## 2024-04-02 PROBLEM — A41.81 SEPTICEMIA DUE TO ENTEROCOCCUS: Status: ACTIVE | Noted: 2024-04-02

## 2024-04-02 LAB
ANION GAP SERPL CALCULATED.3IONS-SCNC: 10.6 MMOL/L (ref 5–15)
BASOPHILS # BLD AUTO: 0.03 10*3/MM3 (ref 0–0.2)
BASOPHILS NFR BLD AUTO: 0.3 % (ref 0–1.5)
BUN SERPL-MCNC: 7 MG/DL (ref 8–23)
BUN/CREAT SERPL: 8.6 (ref 7–25)
CALCIUM SPEC-SCNC: 8.5 MG/DL (ref 8.6–10.5)
CHLORIDE SERPL-SCNC: 101 MMOL/L (ref 98–107)
CO2 SERPL-SCNC: 28.4 MMOL/L (ref 22–29)
CREAT SERPL-MCNC: 0.81 MG/DL (ref 0.57–1)
CRP SERPL-MCNC: 4.17 MG/DL (ref 0–0.5)
DEPRECATED RDW RBC AUTO: 44.7 FL (ref 37–54)
EGFRCR SERPLBLD CKD-EPI 2021: 78.2 ML/MIN/1.73
EOSINOPHIL # BLD AUTO: 0.1 10*3/MM3 (ref 0–0.4)
EOSINOPHIL NFR BLD AUTO: 1 % (ref 0.3–6.2)
ERYTHROCYTE [DISTWIDTH] IN BLOOD BY AUTOMATED COUNT: 14.6 % (ref 12.3–15.4)
ERYTHROCYTE [SEDIMENTATION RATE] IN BLOOD: 10 MM/HR (ref 0–30)
FOLATE SERPL-MCNC: >20 NG/ML (ref 4.78–24.2)
GLUCOSE SERPL-MCNC: 117 MG/DL (ref 65–99)
HCT VFR BLD AUTO: 24.8 % (ref 34–46.6)
HGB BLD-MCNC: 7.8 G/DL (ref 12–15.9)
IMM GRANULOCYTES # BLD AUTO: 0.11 10*3/MM3 (ref 0–0.05)
IMM GRANULOCYTES NFR BLD AUTO: 1.2 % (ref 0–0.5)
IRON 24H UR-MRATE: 27 MCG/DL (ref 37–145)
IRON SATN MFR SERPL: 12 % (ref 20–50)
LYMPHOCYTES # BLD AUTO: 0.91 10*3/MM3 (ref 0.7–3.1)
LYMPHOCYTES NFR BLD AUTO: 9.5 % (ref 19.6–45.3)
MCH RBC QN AUTO: 26.5 PG (ref 26.6–33)
MCHC RBC AUTO-ENTMCNC: 31.5 G/DL (ref 31.5–35.7)
MCV RBC AUTO: 84.4 FL (ref 79–97)
MONOCYTES # BLD AUTO: 0.53 10*3/MM3 (ref 0.1–0.9)
MONOCYTES NFR BLD AUTO: 5.5 % (ref 5–12)
NEUTROPHILS NFR BLD AUTO: 7.87 10*3/MM3 (ref 1.7–7)
NEUTROPHILS NFR BLD AUTO: 82.5 % (ref 42.7–76)
NRBC BLD AUTO-RTO: 0 /100 WBC (ref 0–0.2)
PLATELET # BLD AUTO: 245 10*3/MM3 (ref 140–450)
PMV BLD AUTO: 10.3 FL (ref 6–12)
POTASSIUM SERPL-SCNC: 3.6 MMOL/L (ref 3.5–5.2)
PROCALCITONIN SERPL-MCNC: 0.1 NG/ML (ref 0–0.25)
QT INTERVAL: 346 MS
QTC INTERVAL: 435 MS
RBC # BLD AUTO: 2.94 10*6/MM3 (ref 3.77–5.28)
RETICS # AUTO: 0.12 10*6/MM3 (ref 0.02–0.13)
RETICS/RBC NFR AUTO: 4.04 % (ref 0.7–1.9)
SODIUM SERPL-SCNC: 140 MMOL/L (ref 136–145)
TIBC SERPL-MCNC: 226 MCG/DL (ref 298–536)
TRANSFERRIN SERPL-MCNC: 152 MG/DL (ref 200–360)
VIT B12 BLD-MCNC: 1809 PG/ML (ref 211–946)
WBC NRBC COR # BLD AUTO: 9.55 10*3/MM3 (ref 3.4–10.8)

## 2024-04-02 PROCEDURE — 99223 1ST HOSP IP/OBS HIGH 75: CPT | Performed by: INTERNAL MEDICINE

## 2024-04-02 PROCEDURE — 84145 PROCALCITONIN (PCT): CPT | Performed by: HOSPITALIST

## 2024-04-02 PROCEDURE — 85025 COMPLETE CBC W/AUTO DIFF WBC: CPT | Performed by: HOSPITALIST

## 2024-04-02 PROCEDURE — 82746 ASSAY OF FOLIC ACID SERUM: CPT | Performed by: HOSPITALIST

## 2024-04-02 PROCEDURE — 80048 BASIC METABOLIC PNL TOTAL CA: CPT | Performed by: HOSPITALIST

## 2024-04-02 PROCEDURE — 25010000002 CEFTRIAXONE PER 250 MG: Performed by: NURSE PRACTITIONER

## 2024-04-02 PROCEDURE — 25010000002 AMPICILLIN-SULBACTAM PER 1.5 G: Performed by: NURSE PRACTITIONER

## 2024-04-02 PROCEDURE — 94761 N-INVAS EAR/PLS OXIMETRY MLT: CPT

## 2024-04-02 PROCEDURE — 84466 ASSAY OF TRANSFERRIN: CPT | Performed by: HOSPITALIST

## 2024-04-02 PROCEDURE — 97530 THERAPEUTIC ACTIVITIES: CPT

## 2024-04-02 PROCEDURE — 74230 X-RAY XM SWLNG FUNCJ C+: CPT

## 2024-04-02 PROCEDURE — 94799 UNLISTED PULMONARY SVC/PX: CPT

## 2024-04-02 PROCEDURE — 25010000002 ONDANSETRON PER 1 MG: Performed by: NURSE PRACTITIONER

## 2024-04-02 PROCEDURE — 85652 RBC SED RATE AUTOMATED: CPT | Performed by: HOSPITALIST

## 2024-04-02 PROCEDURE — 25010000002 AMPICILLIN PER 500 MG: Performed by: INTERNAL MEDICINE

## 2024-04-02 PROCEDURE — 87040 BLOOD CULTURE FOR BACTERIA: CPT | Performed by: HOSPITALIST

## 2024-04-02 PROCEDURE — 86140 C-REACTIVE PROTEIN: CPT | Performed by: HOSPITALIST

## 2024-04-02 PROCEDURE — 94664 DEMO&/EVAL PT USE INHALER: CPT

## 2024-04-02 PROCEDURE — 94760 N-INVAS EAR/PLS OXIMETRY 1: CPT

## 2024-04-02 PROCEDURE — 85045 AUTOMATED RETICULOCYTE COUNT: CPT | Performed by: HOSPITALIST

## 2024-04-02 PROCEDURE — 83540 ASSAY OF IRON: CPT | Performed by: HOSPITALIST

## 2024-04-02 PROCEDURE — 82607 VITAMIN B-12: CPT | Performed by: HOSPITALIST

## 2024-04-02 RX ADMIN — BARIUM SULFATE 1 TEASPOON(S): 0.6 CREAM ORAL at 10:14

## 2024-04-02 RX ADMIN — BUDESONIDE 0.5 MG: 0.5 INHALANT RESPIRATORY (INHALATION) at 20:11

## 2024-04-02 RX ADMIN — BARIUM SULFATE 55 ML: 0.81 POWDER, FOR SUSPENSION ORAL at 10:14

## 2024-04-02 RX ADMIN — AMPICILLIN SODIUM 2 G: 2 INJECTION, POWDER, FOR SOLUTION INTRAVENOUS at 23:26

## 2024-04-02 RX ADMIN — CYCLOBENZAPRINE 10 MG: 10 TABLET, FILM COATED ORAL at 20:03

## 2024-04-02 RX ADMIN — ATORVASTATIN CALCIUM 40 MG: 20 TABLET, FILM COATED ORAL at 20:04

## 2024-04-02 RX ADMIN — IPRATROPIUM BROMIDE AND ALBUTEROL SULFATE 3 ML: .5; 3 SOLUTION RESPIRATORY (INHALATION) at 14:45

## 2024-04-02 RX ADMIN — MAGNESIUM OXIDE 400 MG (241.3 MG MAGNESIUM) TABLET 400 MG: TABLET at 08:34

## 2024-04-02 RX ADMIN — AMPICILLIN SODIUM 2 G: 2 INJECTION, POWDER, FOR SOLUTION INTRAVENOUS at 20:04

## 2024-04-02 RX ADMIN — ONDANSETRON HYDROCHLORIDE 4 MG: 2 INJECTION, SOLUTION INTRAMUSCULAR; INTRAVENOUS at 20:21

## 2024-04-02 RX ADMIN — HYDROCODONE BITARTRATE AND ACETAMINOPHEN 1 TABLET: 7.5; 325 TABLET ORAL at 20:03

## 2024-04-02 RX ADMIN — AMPICILLIN SODIUM AND SULBACTAM SODIUM 3 G: 2; 1 INJECTION, POWDER, FOR SOLUTION INTRAMUSCULAR; INTRAVENOUS at 03:49

## 2024-04-02 RX ADMIN — ZOLPIDEM TARTRATE 10 MG: 5 TABLET ORAL at 20:03

## 2024-04-02 RX ADMIN — PANTOPRAZOLE SODIUM 40 MG: 40 TABLET, DELAYED RELEASE ORAL at 04:19

## 2024-04-02 RX ADMIN — BARIUM SULFATE 50 ML: 400 SUSPENSION ORAL at 10:14

## 2024-04-02 RX ADMIN — IPRATROPIUM BROMIDE AND ALBUTEROL SULFATE 3 ML: .5; 3 SOLUTION RESPIRATORY (INHALATION) at 20:07

## 2024-04-02 RX ADMIN — ROPINIROLE 2 MG: 2 TABLET, FILM COATED ORAL at 20:04

## 2024-04-02 RX ADMIN — ROFLUMILAST 500 MCG: 500 TABLET ORAL at 08:34

## 2024-04-02 RX ADMIN — BUDESONIDE 0.5 MG: 0.5 INHALANT RESPIRATORY (INHALATION) at 07:01

## 2024-04-02 RX ADMIN — Medication 10 ML: at 08:35

## 2024-04-02 RX ADMIN — ONDANSETRON HYDROCHLORIDE 4 MG: 2 INJECTION, SOLUTION INTRAMUSCULAR; INTRAVENOUS at 11:17

## 2024-04-02 RX ADMIN — POTASSIUM CHLORIDE 20 MEQ: 750 TABLET, EXTENDED RELEASE ORAL at 08:34

## 2024-04-02 RX ADMIN — CEFTRIAXONE 2000 MG: 2 INJECTION, POWDER, FOR SOLUTION INTRAMUSCULAR; INTRAVENOUS at 08:34

## 2024-04-02 RX ADMIN — CARVEDILOL 6.25 MG: 6.25 TABLET, FILM COATED ORAL at 08:35

## 2024-04-02 RX ADMIN — FUROSEMIDE 40 MG: 40 TABLET ORAL at 08:34

## 2024-04-02 RX ADMIN — HYDROCODONE BITARTRATE AND ACETAMINOPHEN 1 TABLET: 7.5; 325 TABLET ORAL at 04:19

## 2024-04-02 RX ADMIN — HYDROCODONE BITARTRATE AND ACETAMINOPHEN 1 TABLET: 7.5; 325 TABLET ORAL at 11:15

## 2024-04-02 RX ADMIN — SERTRALINE 100 MG: 100 TABLET, FILM COATED ORAL at 08:34

## 2024-04-02 RX ADMIN — CEFTRIAXONE 2000 MG: 2 INJECTION, POWDER, FOR SOLUTION INTRAMUSCULAR; INTRAVENOUS at 21:12

## 2024-04-02 RX ADMIN — LISINOPRIL 40 MG: 40 TABLET ORAL at 08:34

## 2024-04-02 RX ADMIN — IPRATROPIUM BROMIDE AND ALBUTEROL SULFATE 3 ML: .5; 3 SOLUTION RESPIRATORY (INHALATION) at 07:00

## 2024-04-02 RX ADMIN — APIXABAN 5 MG: 5 TABLET, FILM COATED ORAL at 08:34

## 2024-04-02 RX ADMIN — AMPICILLIN SODIUM AND SULBACTAM SODIUM 3 G: 2; 1 INJECTION, POWDER, FOR SOLUTION INTRAMUSCULAR; INTRAVENOUS at 08:39

## 2024-04-02 RX ADMIN — BARIUM SULFATE 4 ML: 980 POWDER, FOR SUSPENSION ORAL at 10:14

## 2024-04-02 RX ADMIN — AMPICILLIN SODIUM 2 G: 2 INJECTION, POWDER, FOR SOLUTION INTRAVENOUS at 17:07

## 2024-04-02 RX ADMIN — FUROSEMIDE 40 MG: 40 TABLET ORAL at 17:08

## 2024-04-02 RX ADMIN — Medication 1 TABLET: at 08:34

## 2024-04-02 RX ADMIN — AMLODIPINE BESYLATE 10 MG: 10 TABLET ORAL at 08:34

## 2024-04-02 RX ADMIN — APIXABAN 5 MG: 5 TABLET, FILM COATED ORAL at 20:04

## 2024-04-02 RX ADMIN — CARVEDILOL 6.25 MG: 6.25 TABLET, FILM COATED ORAL at 17:08

## 2024-04-02 RX ADMIN — Medication 10 ML: at 20:03

## 2024-04-02 RX ADMIN — CETIRIZINE HYDROCHLORIDE 10 MG: 10 TABLET ORAL at 08:34

## 2024-04-02 NOTE — PROGRESS NOTES
Continued Stay Note  [facility]     Patient Name: Paz Browne  MRN: [id number]  Today's Date: 4/2/2024    Admit Date: 3/31/2024    Plan: Home with spouse, HH/Home Infusion referrals pending   Discharge Plan       Row Name 04/02/24 1521       Plan    Plan Home with spouse, HH/Home Infusion referrals pending    Patient/Family in Agreement with Plan yes    Provided Post Acute Provider List? Yes    Post Acute Provider List Home Health    Plan Comments Pt plans home with spouse at d/c. Referrals pending with 's consent to HH/Home Infusion per PT recommendation and for anticipated IV antibiotic need at d/c. CCP to follow. Juanita Johnson LCSW                   Discharge Codes    No documentation.                 Expected Discharge Date and Time       Expected Discharge Date Expected Discharge Time    Apr 5, 2024               Linnea Johnson LCSW

## 2024-04-02 NOTE — DISCHARGE PLACEMENT REQUEST
"Paz Moreau (68 y.o. Female)       Date of Birth   1955    Social Security Number       Address   [address] [place] [address]    Home Phone   [phone]    MRN   [id number]       [facility]    Marital Status                               Admission Date   3/31/24    Admission Type   Emergency    Admitting Provider   Gil Nicole MD    Attending Provider   Gil Nicole MD    Department, Room/Bed   [address], P576/1       Discharge Date       Discharge Disposition       Discharge Destination                                 Attending Provider: Gil Nicole MD    Allergies: Bupropion, Cephalexin, Metoprolol    Isolation: None   Infection: None   Code Status: CPR    Ht: 170.2 cm (67\")   Wt: 59 kg (130 lb 1.1 oz)    Admission Cmt: None   Principal Problem: Pneumonia [J18.9]                   Active Insurance as of 3/31/2024       Primary Coverage       Payor Plan Insurance Group Employer/Plan Group    ANTH MEDICARE REPLACEMENT ANTHEM MEDICARE ADVANTAGE [id number]       Payor Plan Address Payor Plan Phone Number Payor Plan Fax Number Effective Dates    [address] [phone]  1/1/2018 - None Entered    [facility] [id number]         Subscriber Name Subscriber Birth Date Member ID       PAZ MOREAU 1955 [id number]                     Emergency Contacts        (Rel.) Home Phone Work Phone Mobile Phone    Chapincito Moreau (Spouse) [phone] -- [phone]    Cinda Moreau (Daughter) -- -- [phone]    Malissa Calabrese (Daughter) [phone] -- [phone]                "

## 2024-04-02 NOTE — PLAN OF CARE
Goal Outcome Evaluation:  Plan of Care Reviewed With: patient        Progress: improving  Outcome Evaluation: Pt seen by PT this AM for tx. Pt was in bed and sat up to the EOB w/ SBA. Pt stood w/ SBA then amb approx 30' w/ SV / CGA and no ad. Pt rested in chair then stood to perform a few ther ex w/ SOA limiting. Sat on O2 remained in 90's throughout session. Pt limited throughout session by SOA and activity intolerance. PT will prog as pt lai.      Anticipated Discharge Disposition (PT): home with home health, home with assist

## 2024-04-02 NOTE — PROGRESS NOTES
[facility] Clinical Pharmacy Services: Unasyn Consult    Pt Name: Paz Browne   : 1955  Weight: 59 kg (130 lb 1.1 oz)  Antibiotic: Unasyn  Indication: Bacteremia and Endocarditis    Relevant clinical data and objective history reviewed:    Past Medical History:   Diagnosis Date    VAN (acute kidney injury)     Anemia     Asthma     Atrial flutter     cardioversion    Cataract     Celiac artery stenosis     Chronic respiratory failure with hypoxia     Colon polyp     COPD (chronic obstructive pulmonary disease)     GI bleed     Hiatal hernia     History of CHF (congestive heart failure)     due to MR    History of home oxygen therapy     3 lpm NC    History of mitral valve replacement with tissue graft     Hyperlipidemia     Hypertension     Infectious viral hepatitis     B    Intertrigo     Long term (current) use of anticoagulants     Mitral regurgitation     s/p tissue MVR    PAF (paroxysmal atrial fibrillation)     s/p MAZE    Pneumonia     Pulmonary hypertension     S/P TVR (tricuspid valve repair) 2016     Creatinine   Date Value Ref Range Status   2024 0.68 0.57 - 1.00 mg/dL Final   2024 0.76 0.57 - 1.00 mg/dL Final   2024 0.97 0.57 - 1.00 mg/dL Final     BUN   Date Value Ref Range Status   2024 6 (L) 8 - 23 mg/dL Final     Estimated Creatinine Clearance: 71.7 mL/min (by C-G formula based on SCr of 0.68 mg/dL).    Lab Results   Component Value Date    WBC 11.24 (H) 2024     Temp Readings from Last 3 Encounters:   24 97.5 °F (36.4 °C) (Oral)   24 98.8 °F (37.1 °C)   24 97.7 °F (36.5 °C) (Oral)      Assessment/Plan    Ordered Unasyn 3 g IV every 6 hours for a total of 5 days. Will monitor and adjust if culture data or pertinent lab values indicate this is best for the patient.     Thank you for this consult and please contact pharmacy with any questions or concerns.     Danuta Leon, [facility]  Clinical Pharmacist

## 2024-04-02 NOTE — PLAN OF CARE
Goal Outcome Evaluation:  Plan of Care Reviewed With: patient           Outcome Evaluation: VFSS completed this date. Pt accepted PO trials of barium coated thin liquid by cup/straw, nectar thick by cup, puree, soft solid/mixed, and regular consistencies. D/t mildly delayed swallow initiation, intermittent high mod in depth mostly transient penetration during the swallow with thin by cup. Occasional trace residue remains high in laryngeal vestibule and clears with both spontaneous and cued throat clear. Otherwise no penetration or aspiration visualized. Pt also appears to sense penetration as evidenced by throat clearing during study. No significant pharyngeal residue. Recommend continue regular diet and thin liquids. Meds whole as able. General aspiration and reflux precautions. D/t mildly impaired oropharyngeal swallow and intermittent nontransient penetration visualized, pt is at increased risk of aspiration. Will f/u for diet tolerance and further education as appropriate. Pt v/u of recs.      Anticipated Discharge Disposition (SLP): unknown          SLP Swallowing Diagnosis: mild, oral dysphagia, pharyngeal dysphagia (04/02/24 0930)  Treatment Assessment (SLP): continued (04/02/24 0930)

## 2024-04-02 NOTE — THERAPY TREATMENT NOTE
Patient Name: Paz Browne  : 1955    MRN: [id number]                              Today's Date: 2024       Admit Date: 3/31/2024    Visit Dx:     ICD-10-CM ICD-9-CM   1. Pneumonia due to infectious organism, unspecified laterality, unspecified part of lung  J18.9 486     Patient Active Problem List   Diagnosis    PAF (paroxysmal atrial fibrillation)    Hypertension    Hyperlipidemia    Pain medication agreement    Hiatal hernia    Primary osteoarthritis involving multiple joints    Pulmonary hypertension    S/P TVR (tricuspid valve repair)    Pulmonary nodule    Restless leg syndrome    Chronic low back pain    Dysplastic polyp of colon    History of mitral valve replacement with tissue graft    Chronic hypoxemic respiratory failure    Anemia    Celiac artery stenosis    Intertrigo    Abnormal EKG    Muscle spasms of both lower extremities    Iron deficiency anemia    Adenomatous polyp of colon    Gastroesophageal reflux disease without esophagitis    Headache    History of endocarditis    Pneumonia of both lower lobes due to infectious organism    Peptic ulcer disease    Peripheral vascular disease    Hyperlipidemia    Hyperglycemia    COPD with acute exacerbation    Chronic diastolic CHF (congestive heart failure)    Chronic bilateral low back pain    COPD exacerbation    Leukocytosis    Elevated troponin    Pneumonia    Acute-on-chronic respiratory failure    Septicemia due to enterococcus     Past Medical History:   Diagnosis Date    VAN (acute kidney injury)     Anemia     Asthma     Atrial flutter     cardioversion    Cataract     Celiac artery stenosis     Chronic respiratory failure with hypoxia     Colon polyp     COPD (chronic obstructive pulmonary disease)     GI bleed     Hiatal hernia     History of CHF (congestive heart failure)     due to MR    History of home oxygen therapy     3 lpm NC    History of mitral valve replacement with tissue graft     Hyperlipidemia     Hypertension      Infectious viral hepatitis     B    Intertrigo     Long term (current) use of anticoagulants     Mitral regurgitation     s/p tissue MVR    PAF (paroxysmal atrial fibrillation)     s/p MAZE    Pneumonia     Pulmonary hypertension     S/P TVR (tricuspid valve repair) 7/7/2016     Past Surgical History:   Procedure Laterality Date    CARDIAC CATHETERIZATION  09/01/2014    Right dominant systemt, normal coronary arteries.     CARDIAC CATHETERIZATION Left 6/10/2016    Procedure: Cardiac catheterization;  Surgeon: Sergei Hall MD;  Location: [facility] CATH INVASIVE LOCATION;  Service:     CARDIAC CATHETERIZATION N/A 6/10/2016    Procedure: Right Heart Cath;  Surgeon: Sergei Hall MD;  Location: [facility] CATH INVASIVE LOCATION;  Service:     CATARACT EXTRACTION      COLONOSCOPY      COLONOSCOPY N/A 8/4/2017    Procedure: COLONOSCOPY TO CECUM/TI WITH POLYPECTOMY ( COLD BX);  Surgeon: Cleveland Devine MD;  Location: [facility] ENDOSCOPY;  Service:     COLONOSCOPY N/A 8/10/2017    Procedure: COLONOSCOPY to cecum and TI with 2 clips placed at transverse;  Surgeon: Earnest PALOMO MD;  Location: [facility] ENDOSCOPY;  Service:     COLONOSCOPY N/A 12/22/2017    Procedure: COLONOSCOPY INTO CECUM WITH COLD POLYPECTOMIES;  Surgeon: Cleveland Devine MD;  Location: [facility] ENDOSCOPY;  Service:     COLONOSCOPY N/A 5/17/2021    Procedure: COLONOSCOPY to cecum;  Surgeon: Cleveland Devine MD;  Location: [facility] ENDOSCOPY;  Service: Gastroenterology;  Laterality: N/A;  Pre: Fe deficency anemia, h/x of polyps  Post: fair prep, normal    ENDOSCOPY N/A 8/17/2017    Procedure: ESOPHAGOGASTRODUODENOSCOPY;  Surgeon: Porsha Ruby MD;  Location: [facility] ENDOSCOPY;  Service:     ENDOSCOPY N/A 12/22/2017    Procedure: ESOPHAGOGASTRODUODENOSCOPY WITH BIOPSIES;  Surgeon: Cleveland Devine MD;  Location: [facility] ENDOSCOPY;  Service:     ENDOSCOPY N/A 5/17/2021    Procedure: ESOPHAGOGASTRODUODENOSCOPY  with biopsies;  Surgeon: Cleveland Devine MD;  Location: [facility] ENDOSCOPY;   Service: Gastroenterology;  Laterality: N/A;  Pre: Fe deficency anemia, nausea, heme positive stool   Post: gastritis, sloughing of distal esophagus mucosa    GALLBLADDER SURGERY      HEMORRHOIDECTOMY      HYSTERECTOMY      KIDNEY SURGERY  04/22/2013    Stent placement    MAZE PROCEDURE      MITRAL VALVE REPLACEMENT      TONSILLECTOMY      TRICUSPID VALVE REPLACEMENT        General Information       [facility] Name 04/02/24 1122          Physical Therapy Time and Intention    Document Type therapy note (daily note)  -     Mode of Treatment physical therapy  -[facility] Name 04/02/24 1122          General Information    Existing Precautions/Restrictions fall;oxygen therapy device and L/min  -[facility] Name 04/02/24 1122          Cognition    Orientation Status (Cognition) oriented x 4  -PH       [facility] Name 04/02/24 1122          Safety Issues, Functional Mobility    Impairments Affecting Function (Mobility) endurance/activity tolerance;strength;shortness of breath  -PH     Comment, Safety Issues/Impairments (Mobility) gt belt and non skid socks donned  -               User Key  (r) = Recorded By, (t) = Taken By, (c) = Cosigned By      Initials Name Provider Type    PH Nyla Livingston PTA Physical Therapist Assistant                   Mobility       [facility] Name 04/02/24 1123          Bed Mobility    Bed Mobility supine-sit  -PH     Supine-Sit [place] (Bed Mobility) standby assist  -PH     Assistive Device (Bed Mobility) head of bed elevated;bed rails  -[facility] Name 04/02/24 1123          Sit-Stand Transfer    Sit-Stand [place] (Transfers) standby assist  -PH     Assistive Device (Sit-Stand Transfers) walker, front-wheeled  -PH     Comment, (Sit-Stand Transfer) STS x 3; 1x from EOB and 2x from chair  -PH       [facility] Name 04/02/24 1123          Gait/Stairs (Locomotion)    [place] Level (Gait) supervision  -PH     Assistive Device (Gait) other (see comments)  no AD  -PH     Distance in Feet (Gait)  30  -PH     Deviations/Abnormal Patterns (Gait) gait speed decreased  -PH     Comment, (Gait/Stairs) slow w/ no overt LOB; pt c/o SOA as well as noted w/ sats at 92% w/ NC  -PH               User Key  (r) = Recorded By, (t) = Taken By, (c) = Cosigned By      Initials Name Provider Type     Nyla Livingston PTA Physical Therapist Assistant                   Obj/Interventions       Row Name 04/02/24 1124          Motor Skills    Therapeutic Exercise other (see comments)  B heel raises, mini squats, LAQ; x 10 reps  -PH       Row Name 04/02/24 1124          Balance    Balance Assessment sitting static balance;standing static balance  -PH     Static Sitting Balance independent  -PH     Static Standing Balance supervision  -PH     Position/Device Used, Standing Balance other (see comments)  no AD  -PH               User Key  (r) = Recorded By, (t) = Taken By, (c) = Cosigned By      Initials Name Provider Type     Nyla Livingston PTA Physical Therapist Assistant                   Goals/Plan    No documentation.                  Clinical Impression       Row Name 04/02/24 1125          Pain    Pretreatment Pain Rating 0/10 - no pain  -PH     Posttreatment Pain Rating 0/10 - no pain  -PH     Additional Documentation Pain Scale: Numbers Pre/Post-Treatment (Group)  -PH       Row Name 04/02/24 1125          Plan of Care Review    Plan of Care Reviewed With patient  -PH     Progress improving  -PH     Outcome Evaluation Pt seen by PT this AM for tx. Pt was in bed and sat up to the EOB w/ SBA. Pt stood w/ SBA then amb approx 30' w/ SV / CGA and no ad. Pt rested in chair then stood to perform a few ther ex w/ SOA limiting. Sat on O2 remained in 90's throughout session. Pt limited throughout session by SOA and activity intolerance. PT will prog as pt lai.  -PH       Row Name 04/02/24 1125          Vital Signs    O2 Delivery Pre Treatment supplemental O2  -PH     Intra SpO2 (%) 92  -PH     O2 Delivery Intra  Treatment supplemental O2  -PH     Post SpO2 (%) 95  -PH     O2 Delivery Post Treatment supplemental O2  -PH       Row Name 04/02/24 1125          Positioning and Restraints    Pre-Treatment Position in bed  -PH     Post Treatment Position chair  -PH     In Chair reclined;call light within reach;encouraged to call for assist;exit alarm on;with family/caregiver;notified nsg  -PH               User Key  (r) = Recorded By, (t) = Taken By, (c) = Cosigned By      Initials Name Provider Type     Nyla Livingston PTA Physical Therapist Assistant                   Outcome Measures       Row Name 04/02/24 1128          How much help from another person do you currently need...    Turning from your back to your side while in flat bed without using bedrails? 4  -PH     Moving from lying on back to sitting on the side of a flat bed without bedrails? 3  -PH     Moving to and from a bed to a chair (including a wheelchair)? 3  -PH     Standing up from a chair using your arms (e.g., wheelchair, bedside chair)? 3  -PH     Climbing 3-5 steps with a railing? 2  -PH     To walk in hospital room? 3  -PH     AM-PAC 6 Clicks Score (PT) 18  -PH     Highest Level of Mobility Goal 6 --> Walk 10 steps or more  -PH       Row Name 04/02/24 1128          Functional Assessment    Outcome Measure Options AM-PAC 6 Clicks Basic Mobility (PT)  -PH               User Key  (r) = Recorded By, (t) = Taken By, (c) = Cosigned By      Initials Name Provider Type     Nyla Livingston PTA Physical Therapist Assistant                                 Physical Therapy Education       Title: PT OT SLP Therapies (Done)       Topic: Physical Therapy (Done)       Point: Mobility training (Done)       Learning Progress Summary             Patient Acceptance, E,TB,D, VU by PH at 4/2/2024 1128    Acceptance, E, VU by LEONA at 4/1/2024 0847                         Point: Home exercise program (Done)       Learning Progress Summary             Patient  Acceptance, E,TB,D, VU by  at 4/2/2024 1128                         Point: Body mechanics (Done)       Learning Progress Summary             Patient Acceptance, E,TB,D, VU by  at 4/2/2024 1128                         Point: Precautions (Done)       Learning Progress Summary             Patient Acceptance, E,TB,D, VU by  at 4/2/2024 1128                                         User Key       Initials Effective Dates Name Provider Type Discipline     06/16/21 -  yNla Livingston PTA Physical Therapist Assistant PT     12/13/22 -  Stephanie Vazquez PT Physical Therapist PT                  PT Recommendation and Plan     Plan of Care Reviewed With: patient  Progress: improving  Outcome Evaluation: Pt seen by PT this AM for tx. Pt was in bed and sat up to the EOB w/ SBA. Pt stood w/ SBA then amb approx 30' w/ SV / CGA and no ad. Pt rested in chair then stood to perform a few ther ex w/ SOA limiting. Sat on O2 remained in 90's throughout session. Pt limited throughout session by SOA and activity intolerance. PT will prog as pt lai.     Time Calculation:         PT Charges       Row Name 04/02/24 1129             Time Calculation    Start Time 0839  -PH      Stop Time 0849  -PH      Time Calculation (min) 10 min  -PH      PT Received On 04/02/24  -PH      PT - Next Appointment 04/03/24  -PH         Timed Charges    31726 - PT Therapeutic Activity Minutes 10  -PH         Total Minutes    Timed Charges Total Minutes 10  -PH       Total Minutes 10  -PH                User Key  (r) = Recorded By, (t) = Taken By, (c) = Cosigned By      Initials Name Provider Type     Nyla Livingston PTA Physical Therapist Assistant                  Therapy Charges for Today       Code Description Service Date Service Provider Modifiers Qty    48302046890 HC PT THERAPEUTIC ACT EA 15 MIN 4/2/2024 Nyla Livingston PTA GP 1            PT G-Codes  Outcome Measure Options: AM-PAC 6 Clicks Basic Mobility (PT)  AM-PAC  6 Clicks Score (PT): 18  PT Discharge Summary  Anticipated Discharge Disposition (PT): home with home health, home with assist    Nyla Livingston, PTA  4/2/2024

## 2024-04-02 NOTE — PROGRESS NOTES
"DAILY PROGRESS NOTE  [facility]    Patient Identification:  Name: Paz Browne  Age: 68 y.o.  Sex: female  :  1955  MRN: [id number]         Primary Care Physician: Javan Martinez MD      Subjective  Patient continues to feel well with no new or acute complaints.  She states she did manage to spit up some thick \"yellowish\" sputum this morning.    Objective:  General Appearance:  Comfortable, well-appearing, in no acute distress and not in pain.    Vital signs: (most recent): Blood pressure 108/67, pulse 92, temperature 98.8 °F (37.1 °C), temperature source Oral, resp. rate 18, height 170.2 cm (67\"), weight 59 kg (130 lb 1.1 oz), SpO2 97%.    Lungs:  Normal effort and normal respiratory rate.  Breath sounds clear to auscultation.    Heart: Normal rate.  Regular rhythm.    Extremities: There is no dependent edema.    Neurological: Patient is alert and oriented to person, place and time.    Skin:  Warm and dry.                  Vital signs in last 24 hours:  Temp:  [97.5 °F (36.4 °C)-98.8 °F (37.1 °C)] 98.8 °F (37.1 °C)  Heart Rate:  [] 92  Resp:  [16-20] 18  BP: (107-162)/(48-71) 108/67    Intake/Output:    Intake/Output Summary (Last 24 hours) at 2024 0856  Last data filed at 2024 1305  Gross per 24 hour   Intake 360 ml   Output --   Net 360 ml         Results from last 7 days   Lab Units 24  0526 24  0610 247   WBC 10*3/mm3 9.55 11.24* 12.77*   HEMOGLOBIN g/dL 7.8* 8.2* 9.4*   PLATELETS 10*3/mm3 245 246 268     Results from last 7 days   Lab Units 24  0526 24  0610 247   SODIUM mmol/L 140 139 141   POTASSIUM mmol/L 3.6 3.7 4.1   CHLORIDE mmol/L 101 101 101   CO2 mmol/L 28.4 30.0* 30.0*   BUN mg/dL 7* 6* 9   CREATININE mg/dL 0.81 0.68 0.76   GLUCOSE mg/dL 117* 94 123*   Estimated Creatinine Clearance: 60.2 mL/min (by C-G formula based on SCr of 0.81 mg/dL).  Results from last 7 days   Lab Units 24  0526 24  0610 " 03/31/24  2127   CALCIUM mg/dL 8.5* 8.9 9.7   ALBUMIN g/dL  --   --  3.6     Results from last 7 days   Lab Units 03/31/24  2127   ALBUMIN g/dL 3.6   BILIRUBIN mg/dL 0.6   ALK PHOS U/L 94   AST (SGOT) U/L 19   ALT (SGPT) U/L 20       Assessment:  Enterococcus septicemia: Very concerning in light of her recurrent hospitalizations, previous history of SBE involving a bioprosthetic mitral valve with persistent calcified vegetation.  Antibiotic switched accordingly.  Repeat blood cultures ordered.  Infectious disease consultation.  Possible SBE: See discussion above.  Pulmonary input greatly appreciated.  Pulmonary infiltrate/pneumonia: Pulmonary input greatly appreciated.  Should be covered by the above antibiotics.  Evaluation for possible aspiration.  COPD exacerbation: Resolved.    Chronic hypoxic respiratory failure: Appears to be at baseline at present.  Paroxysmal atrial fibrillation: Rate reasonably controlled.  Continue rate control and anticoagulants.  Hypertension: Continue home meds.  Monitor.  Anemia: Workup so far consistent with anemia of chronic disease with a degree of iron deficiency.  Hemoglobin down a bit further today.  Monitor closely.  Check reticulocyte count.        Plan:  Please see above.  Discussed with patient and her son at bedside.  Discussed with pulmonology.  Discussed with RN.    Gil Nicole MD  4/2/2024  08:56 EDT

## 2024-04-02 NOTE — MBS/VFSS/FEES
Acute Care - Speech Language Pathology   Swallow Initial Evaluation [facility]     Patient Name: Paz Browne  : 1955  MRN: [id number]  Today's Date: 2024               Admit Date: 3/31/2024    Visit Dx:     ICD-10-CM ICD-9-CM   1. Pneumonia due to infectious organism, unspecified laterality, unspecified part of lung  J18.9 486     Patient Active Problem List   Diagnosis    PAF (paroxysmal atrial fibrillation)    Hypertension    Hyperlipidemia    Pain medication agreement    Hiatal hernia    Primary osteoarthritis involving multiple joints    Pulmonary hypertension    S/P TVR (tricuspid valve repair)    Pulmonary nodule    Restless leg syndrome    Chronic low back pain    Dysplastic polyp of colon    History of mitral valve replacement with tissue graft    Chronic hypoxemic respiratory failure    Anemia    Celiac artery stenosis    Intertrigo    Abnormal EKG    Muscle spasms of both lower extremities    Iron deficiency anemia    Adenomatous polyp of colon    Gastroesophageal reflux disease without esophagitis    Headache    History of endocarditis    Pneumonia of both lower lobes due to infectious organism    Peptic ulcer disease    Peripheral vascular disease    Hyperlipidemia    Hyperglycemia    COPD with acute exacerbation    Chronic diastolic CHF (congestive heart failure)    Chronic bilateral low back pain    COPD exacerbation    Leukocytosis    Elevated troponin    Pneumonia    Acute-on-chronic respiratory failure    Septicemia due to enterococcus     Past Medical History:   Diagnosis Date    VAN (acute kidney injury)     Anemia     Asthma     Atrial flutter     cardioversion    Cataract     Celiac artery stenosis     Chronic respiratory failure with hypoxia     Colon polyp     COPD (chronic obstructive pulmonary disease)     GI bleed     Hiatal hernia     History of CHF (congestive heart failure)     due to MR    History of home oxygen therapy     3 lpm NC    History of mitral valve  replacement with tissue graft     Hyperlipidemia     Hypertension     Infectious viral hepatitis     B    Intertrigo     Long term (current) use of anticoagulants     Mitral regurgitation     s/p tissue MVR    PAF (paroxysmal atrial fibrillation)     s/p MAZE    Pneumonia     Pulmonary hypertension     S/P TVR (tricuspid valve repair) 7/7/2016     Past Surgical History:   Procedure Laterality Date    CARDIAC CATHETERIZATION  09/01/2014    Right dominant systemt, normal coronary arteries.     CARDIAC CATHETERIZATION Left 6/10/2016    Procedure: Cardiac catheterization;  Surgeon: Sergei Hall MD;  Location: [facility] CATH INVASIVE LOCATION;  Service:     CARDIAC CATHETERIZATION N/A 6/10/2016    Procedure: Right Heart Cath;  Surgeon: Sergei Hall MD;  Location: [facility] CATH INVASIVE LOCATION;  Service:     CATARACT EXTRACTION      COLONOSCOPY      COLONOSCOPY N/A 8/4/2017    Procedure: COLONOSCOPY TO CECUM/TI WITH POLYPECTOMY ( COLD BX);  Surgeon: Cleveland Devine MD;  Location: [facility] ENDOSCOPY;  Service:     COLONOSCOPY N/A 8/10/2017    Procedure: COLONOSCOPY to cecum and TI with 2 clips placed at transverse;  Surgeon: Earnest PALOMO MD;  Location: [facility] ENDOSCOPY;  Service:     COLONOSCOPY N/A 12/22/2017    Procedure: COLONOSCOPY INTO CECUM WITH COLD POLYPECTOMIES;  Surgeon: Cleveland Devine MD;  Location: [facility] ENDOSCOPY;  Service:     COLONOSCOPY N/A 5/17/2021    Procedure: COLONOSCOPY to cecum;  Surgeon: Cleveland Devine MD;  Location: [facility] ENDOSCOPY;  Service: Gastroenterology;  Laterality: N/A;  Pre: Fe deficency anemia, h/x of polyps  Post: fair prep, normal    ENDOSCOPY N/A 8/17/2017    Procedure: ESOPHAGOGASTRODUODENOSCOPY;  Surgeon: Porsha Ruby MD;  Location: [facility] ENDOSCOPY;  Service:     ENDOSCOPY N/A 12/22/2017    Procedure: ESOPHAGOGASTRODUODENOSCOPY WITH BIOPSIES;  Surgeon: Cleveland Devine MD;  Location: [facility] ENDOSCOPY;  Service:     ENDOSCOPY N/A 5/17/2021    Procedure: ESOPHAGOGASTRODUODENOSCOPY  with  biopsies;  Surgeon: Cleveland Devine MD;  Location: [facility] ENDOSCOPY;  Service: Gastroenterology;  Laterality: N/A;  Pre: Fe deficency anemia, nausea, heme positive stool   Post: gastritis, sloughing of distal esophagus mucosa    GALLBLADDER SURGERY      HEMORRHOIDECTOMY      HYSTERECTOMY      KIDNEY SURGERY  04/22/2013    Stent placement    MAZE PROCEDURE      MITRAL VALVE REPLACEMENT      TONSILLECTOMY      TRICUSPID VALVE REPLACEMENT         SLP Recommendation and Plan  SLP Swallowing Diagnosis: mild, oral dysphagia, pharyngeal dysphagia (04/02/24 0930)  SLP Diet Recommendation: regular textures, thin liquids (04/02/24 0930)  Recommended Precautions and Strategies: upright posture during/after eating, small bites of food and sips of liquid, general aspiration precautions, reflux precautions (04/02/24 0930)  SLP Rec. for Method of Medication Administration: meds whole, with thin liquids, as tolerated (04/02/24 0930)     Monitor for Signs of Aspiration: yes, notify SLP if any concerns (04/02/24 0930)  Recommended Diagnostics: No further SLP services recommended (04/02/24 0930)  Swallow Criteria for Skilled Therapeutic Interventions Met: demonstrates skilled criteria (04/02/24 0930)  Anticipated Discharge Disposition (SLP): unknown (04/02/24 0930)  Rehab Potential/Prognosis, Swallowing: good, to achieve stated therapy goals (04/02/24 0930)  Therapy Frequency (Swallow): PRN (04/02/24 0930)  Predicted Duration Therapy Intervention (Days): until discharge (04/02/24 0930)  Oral Care Recommendations: Oral Care BID/PRN (04/02/24 0930)        Daily Summary of Progress (SLP): progress toward functional goals is good (04/02/24 0930)               Treatment Assessment (SLP): continued (04/02/24 0930)               Plan of Care Reviewed With: patient  Outcome Evaluation: VFSS completed this date. Pt accepted PO trials of barium coated thin liquid by cup/straw, nectar thick by cup, puree, soft solid/mixed, and regular  "consistencies. D/t mildly delayed swallow initiation, intermittent high mod in depth mostly transient penetration during the swallow with thin by cup. Occasional trace residue remains high in laryngeal vestibule and clears with both spontaenous and cued throat clear. Otherwise no penetration or aspiration visualized. Pt also appears to sense penetration as evidenced by throat clearing during study. No significant pharyngeal residue. Recommend continue regular diet and thin liquids. Meds whole as able. General aspiration and reflux precautions. D/t mildly impaired oropharyngeal swallow and intermittent nontransient penetration visualized, pt is at increased risk of aspiration. Will f/u for diet tolerance and further education as appropriate. Pt v/u of recs.      SWALLOW EVALUATION (Last 72 Hours)       SLP Adult Swallow Evaluation       Row Name 04/02/24 5840       Rehab Evaluation    Document Type evaluation  -HF    Patient Observations alert;cooperative;agree to therapy  -HF    Patient Effort excellent  -HF       General Information    Patient Profile Reviewed yes  -HF    Pertinent History Of Current Problem \" Pt is a 69 yo female admitted to [facility] with SOA and cough. Pt with hx of copd, A fib, chf, htn.\" CXR: \"Patchy infiltrate suspected within the right midlung. There may be some  additional involvement at the left lung base.  \"  -HF    Current Method of Nutrition regular textures;thin liquids  -HF    Precautions/Limitations, Vision WFL  -HF    Precautions/Limitations, Hearing WFL  -HF    Prior Level of Function-Communication WFL  -HF    Prior Level of Function-Swallowing no diet consistency restrictions  -HF    Plans/Goals Discussed with patient;agreed upon  -HF    Barriers to Rehab medically complex  -HF       Pain    Additional Documentation Pain Scale: FACES Pre/Post-Treatment (Group)  -HF       Pain Scale: Numbers Pre/Post-Treatment    Pretreatment Pain Rating 0/10 - no pain  -HF       Oral Motor Structure and " Function    Dentition Assessment natural, present and adequate  -HF    Secretion Management WNL/WFL  -HF    Mucosal Quality moist, healthy  -HF       Oral Musculature and Cranial Nerve Assessment    Oral Motor General Assessment WFL  -HF       General Eating/Swallowing Observations    Respiratory Support Currently in Use nasal cannula  -HF    O2 Liters 2L  -HF    Eating/Swallowing Skills self-fed;appropriate self-feeding skills observed  -HF    Positioning During Eating upright 90 degree;upright in bed  -HF    Utensils Used spoon;cup;straw  -HF    Consistencies Trialed regular textures;soft to chew textures;mixed consistency;pureed;thin liquids;nectar/syrup-thick liquids  -HF       Respiratory    Respiratory Status WFL  -HF       MBS/VFSS Interpretation    VFSS Summary VFSS completed this date. Pt accepted PO trials of barium coated thin liquid by cup/straw, nectar thick by cup, puree, soft solid/mixed, and regular consistencies. Timely and functional mastication, no oral residue. Fair-good bolus control, premature spillage of thin liquid portion of mixed to the pyriforms and to the vallecula with thin by cup/straw. Upon swallow initiation, adequate anterior hyoid excursion, functional laryngeal elevation, and complete epiglottic inversion. D/t mildly delayed swalow initiation, intermittent high mod in depth mostly transient penetration during the swallow with thin by cup. Occasional trace residue remains high in laryngeal vestibule and clears with both spontaenous and cued throat clear. Otherwise no penetration or aspiration visualized. Pt also appears to sense penetration as evidenced by throat clearing during study. No significant pharyngeal residue. Recommend continue regular diet and thin liquids. Meds whole as able. General aspiration and reflux precautions. SLP to follow for diet tolerance.  -HF       SLP Communication to Radiology    Summary Statement Radiologist present during study. Intermittent high mod  in depth mostly transient penetration during the swallow with thin by cup. Occasional trace residue remains high in laryngeal vestibule and clears with both spontaenous and cued throat clear. Otherwise no penetration or aspiration visualized.  -HF       SLP Evaluation Clinical Impression    SLP Swallowing Diagnosis mild;oral dysphagia;pharyngeal dysphagia  -HF    Functional Impact risk of aspiration/pneumonia  -HF    Rehab Potential/Prognosis, Swallowing good, to achieve stated therapy goals  -HF    Swallow Criteria for Skilled Therapeutic Interventions Met demonstrates skilled criteria  -HF       SLP Treatment Clinical Impressions    Treatment Assessment (SLP) continued  -HF    Daily Summary of Progress (SLP) progress toward functional goals is good  -HF       Recommendations    Therapy Frequency (Swallow) PRN  -HF    Predicted Duration Therapy Intervention (Days) until discharge  -HF    SLP Diet Recommendation regular textures;thin liquids  -HF    Recommended Diagnostics No further SLP services recommended  -HF    Recommended Precautions and Strategies upright posture during/after eating;small bites of food and sips of liquid;general aspiration precautions;reflux precautions  -HF    Oral Care Recommendations Oral Care BID/PRN  -HF    SLP Rec. for Method of Medication Administration meds whole;with thin liquids;as tolerated  -HF    Monitor for Signs of Aspiration yes;notify SLP if any concerns  -HF    Anticipated Discharge Disposition (SLP) unknown  -HF       Swallow Goals (SLP)    Swallow LTGs Patient will demonstrate functional swallow for  -HF    Swallow STGs diet tolerance goal selection (SLP)  -HF       (LTG) Patient will demonstrate functional swallow for    Diet Texture (Demonstrate functional swallow) regular textures  -HF    Liquid viscosity (Demonstrate functional swallow) thin liquids  -HF    [place] (Demonstrate functional swallow) independently (over 90% accuracy)  -HF    Time Frame (Demonstrate  functional swallow) by discharge  -HF              User Key  (r) = Recorded By, (t) = Taken By, (c) = Cosigned By      Initials Name Effective Dates    Helen Christianson SLP 08/01/23 -                     EDUCATION  The patient has been educated in the following areas:   Dysphagia (Swallowing Impairment) Oral Care/Hydration.        SLP GOALS       Row Name 04/02/24 0930       (LTG) Patient will demonstrate functional swallow for    Diet Texture (Demonstrate functional swallow) regular textures  -HF    Liquid viscosity (Demonstrate functional swallow) thin liquids  -HF    [place] (Demonstrate functional swallow) independently (over 90% accuracy)  -HF    Time Frame (Demonstrate functional swallow) by discharge  -HF              User Key  (r) = Recorded By, (t) = Taken By, (c) = Cosigned By      Initials Name Provider Type    Helen Christianson SLP Speech and Language Pathologist                       Time Calculation:    Time Calculation- SLP       Row Name 04/02/24 1211             Time Calculation- SLP    SLP Start Time 0915  -HF      SLP Received On 04/02/24  -HF                User Key  (r) = Recorded By, (t) = Taken By, (c) = Cosigned By      Initials Name Provider Type    Helen Christianson SLP Speech and Language Pathologist                             LUIS Jamison  4/2/2024

## 2024-04-02 NOTE — PLAN OF CARE
Problem: Adult Inpatient Plan of Care  Goal: Plan of Care Review  Outcome: Ongoing, Progressing  Flowsheets (Taken 4/2/2024 0147)  Plan of Care Reviewed With: patient  Goal: Patient-Specific Goal (Individualized)  Outcome: Ongoing, Progressing  Goal: Absence of Hospital-Acquired Illness or Injury  Outcome: Ongoing, Progressing  Intervention: Identify and Manage Fall Risk  Recent Flowsheet Documentation  Taken 4/2/2024 0146 by Almita Jha RN  Safety Promotion/Fall Prevention:   activity supervised   assistive device/personal items within reach   clutter free environment maintained   safety round/check completed  Taken 4/2/2024 0000 by Almita Jha RN  Safety Promotion/Fall Prevention:   activity supervised   assistive device/personal items within reach   clutter free environment maintained   safety round/check completed  Taken 4/1/2024 2200 by Almita Jha RN  Safety Promotion/Fall Prevention:   activity supervised   assistive device/personal items within reach   clutter free environment maintained   safety round/check completed  Taken 4/1/2024 2000 by Almita Jha RN  Safety Promotion/Fall Prevention: safety round/check completed  Intervention: Prevent Skin Injury  Recent Flowsheet Documentation  Taken 4/2/2024 0146 by Almita Jha RN  Body Position: position changed independently  Taken 4/2/2024 0000 by Almita Jha RN  Body Position: position changed independently  Taken 4/1/2024 2200 by Almita Jha RN  Body Position: position changed independently  Taken 4/1/2024 2000 by Almita Jha RN  Body Position: position changed independently  Skin Protection: adhesive use limited  Intervention: Prevent and Manage VTE (Venous Thromboembolism) Risk  Recent Flowsheet Documentation  Taken 4/1/2024 2000 by Almita Jha RN  VTE Prevention/Management:   bilateral   sequential compression devices on  Range of Motion: active ROM (range of motion) encouraged  Intervention: Prevent Infection  Recent  Flowsheet Documentation  Taken 4/1/2024 2000 by Almita Jha RN  Infection Prevention: single patient room provided  Goal: Optimal Comfort and Wellbeing  Outcome: Ongoing, Progressing  Intervention: Provide Person-Centered Care  Recent Flowsheet Documentation  Taken 4/1/2024 2000 by Almita Jha RN  Trust Relationship/Rapport:   care explained   choices provided  Goal: Readiness for Transition of Care  Outcome: Ongoing, Progressing     Problem: Fall Injury Risk  Goal: Absence of Fall and Fall-Related Injury  Outcome: Ongoing, Progressing  Intervention: Identify and Manage Contributors  Recent Flowsheet Documentation  Taken 4/2/2024 0146 by Almita Jha RN  Medication Review/Management: medications reviewed  Taken 4/2/2024 0000 by Almita Jha RN  Medication Review/Management: medications reviewed  Taken 4/1/2024 2200 by Almita Jha RN  Medication Review/Management: medications reviewed  Taken 4/1/2024 2000 by Almita Jha RN  Medication Review/Management: medications reviewed  Intervention: Promote Injury-Free Environment  Recent Flowsheet Documentation  Taken 4/2/2024 0146 by Almita Jha RN  Safety Promotion/Fall Prevention:   activity supervised   assistive device/personal items within reach   clutter free environment maintained   safety round/check completed  Taken 4/2/2024 0000 by Almita Jha RN  Safety Promotion/Fall Prevention:   activity supervised   assistive device/personal items within reach   clutter free environment maintained   safety round/check completed  Taken 4/1/2024 2200 by Almita Jha RN  Safety Promotion/Fall Prevention:   activity supervised   assistive device/personal items within reach   clutter free environment maintained   safety round/check completed  Taken 4/1/2024 2000 by Almita Jha RN  Safety Promotion/Fall Prevention: safety round/check completed     Problem: Fluid Imbalance (Pneumonia)  Goal: Fluid Balance  Outcome: Ongoing, Progressing     Problem:  Infection (Pneumonia)  Goal: Resolution of Infection Signs and Symptoms  Outcome: Ongoing, Progressing     Problem: Respiratory Compromise (Pneumonia)  Goal: Effective Oxygenation and Ventilation  Outcome: Ongoing, Progressing  Intervention: Promote Airway Secretion Clearance  Recent Flowsheet Documentation  Taken 4/1/2024 2000 by Almita Jha RN  Cough And Deep Breathing: done independently per patient  Intervention: Optimize Oxygenation and Ventilation  Recent Flowsheet Documentation  Taken 4/2/2024 0146 by Almita Jha RN  Head of Bed (HOB) Positioning: HOB at 20 degrees  Taken 4/2/2024 0000 by Almita Jha RN  Head of Bed (HOB) Positioning: HOB at 20 degrees  Taken 4/1/2024 2200 by Almita Jha RN  Head of Bed (HOB) Positioning: HOB at 20 degrees  Taken 4/1/2024 2000 by Almita Jha RN  Head of Bed (HOB) Positioning: HOB at 20 degrees     Problem: Adjustment to Illness COPD (Chronic Obstructive Pulmonary Disease)  Goal: Optimal Chronic Illness Coping  Outcome: Ongoing, Progressing  Intervention: Support and Optimize Psychosocial Response  Recent Flowsheet Documentation  Taken 4/1/2024 2000 by Almita Jha RN  Supportive Measures: active listening utilized  Family/Support System Care: self-care encouraged     Problem: Functional Ability Impaired COPD (Chronic Obstructive Pulmonary Disease)  Goal: Optimal Level of Functional [place]  Outcome: Ongoing, Progressing  Intervention: Optimize Functional Ability  Recent Flowsheet Documentation  Taken 4/1/2024 2000 by Almita Jha RN  Environmental Support: calm environment promoted     Problem: Infection COPD (Chronic Obstructive Pulmonary Disease)  Goal: Absence of Infection Signs and Symptoms  Outcome: Ongoing, Progressing     Problem: Oral Intake Inadequate COPD (Chronic Obstructive Pulmonary Disease)  Goal: Improved Nutrition Intake  Outcome: Ongoing, Progressing     Problem: Respiratory Compromise COPD (Chronic Obstructive Pulmonary  Disease)  Goal: Effective Oxygenation and Ventilation  Outcome: Ongoing, Progressing  Intervention: Promote Airway Secretion Clearance  Recent Flowsheet Documentation  Taken 4/1/2024 2000 by Almita Jha RN  Cough And Deep Breathing: done independently per patient  Intervention: Optimize Oxygenation and Ventilation  Recent Flowsheet Documentation  Taken 4/2/2024 0146 by Almita Jha RN  Head of Bed (HOB) Positioning: HOB at 20 degrees  Taken 4/2/2024 0000 by Almita Jha RN  Head of Bed (HOB) Positioning: HOB at 20 degrees  Taken 4/1/2024 2200 by Almita Jha RN  Head of Bed (HOB) Positioning: HOB at 20 degrees  Taken 4/1/2024 2000 by Almita Jha RN  Head of Bed ([facility]) Positioning: HOB at 20 degrees     Problem: Infection  Goal: Absence of Infection Signs and Symptoms  Outcome: Ongoing, Progressing     Problem: Activity Intolerance (Pulmonary Impairment)  Goal: Improved Activity Tolerance  Outcome: Ongoing, Progressing     Problem: Airway Clearance Ineffective (Pulmonary Impairment)  Goal: Effective Airway Clearance  Outcome: Ongoing, Progressing     Problem: Gas Exchange Impaired (Pulmonary Impairment)  Goal: Optimal Gas Exchange  Outcome: Ongoing, Progressing     Problem: Adjustment to Illness (Sepsis/Septic Shock)  Goal: Optimal Coping  Outcome: Ongoing, Progressing  Intervention: Optimize Psychosocial Adjustment to Illness  Recent Flowsheet Documentation  Taken 4/1/2024 2000 by Almita Jha RN  Supportive Measures: active listening utilized  Family/Support System Care: self-care encouraged     Problem: Bleeding (Sepsis/Septic Shock)  Goal: Absence of Bleeding  Outcome: Ongoing, Progressing     Problem: Glycemic Control Impaired (Sepsis/Septic Shock)  Goal: Blood Glucose Level Within Desired Range  Outcome: Ongoing, Progressing     Problem: Infection Progression (Sepsis/Septic Shock)  Goal: Absence of Infection Signs and Symptoms  Outcome: Ongoing, Progressing  Intervention: Initiate Sepsis  Management  Recent Flowsheet Documentation  Taken 4/1/2024 2000 by Almita Jha RN  Infection Prevention: single patient room provided  Intervention: Promote Recovery  Recent Flowsheet Documentation  Taken 4/1/2024 2000 by Almita Jha RN  Sleep/Rest Enhancement: awakenings minimized     Problem: Nutrition Impaired (Sepsis/Septic Shock)  Goal: Optimal Nutrition Intake  Outcome: Ongoing, Progressing     Problem: COPD (Chronic Obstructive Pulmonary Disease) Comorbidity  Goal: Maintenance of COPD Symptom Control  Outcome: Ongoing, Progressing  Intervention: Maintain COPD-Symptom Control  Recent Flowsheet Documentation  Taken 4/2/2024 0146 by Almita Jha RN  Medication Review/Management: medications reviewed  Taken 4/2/2024 0000 by Almita Jha RN  Medication Review/Management: medications reviewed  Taken 4/1/2024 2200 by Almita Jha RN  Medication Review/Management: medications reviewed  Taken 4/1/2024 2000 by Almita Jha RN  Supportive Measures: active listening utilized  Medication Review/Management: medications reviewed     Problem: Heart Failure Comorbidity  Goal: Maintenance of Heart Failure Symptom Control  Outcome: Ongoing, Progressing  Intervention: Maintain Heart Failure-Management  Recent Flowsheet Documentation  Taken 4/2/2024 0146 by Almita Jha RN  Medication Review/Management: medications reviewed  Taken 4/2/2024 0000 by Almita Jha RN  Medication Review/Management: medications reviewed  Taken 4/1/2024 2200 by Almita Jha RN  Medication Review/Management: medications reviewed  Taken 4/1/2024 2000 by Almita Jha RN  Medication Review/Management: medications reviewed   Goal Outcome Evaluation:  Plan of Care Reviewed With: patient

## 2024-04-02 NOTE — CONSULTS
"Referring Provider: Jose Luis Bella MD  [address] [place] [address]    Reason for Consultation: Current hospitalization.  Blood cultures positive for Enterococcus.  History of SBE on bioprosthetic mitral valve with residual calcified vegetation     History of present illness:  Paz Browne is a 68 y.o. with past medical history of mitral valve replacement and tricuspid valve repair who I am asked to evaluate and give opinion for \"Current hospitalization.  Blood cultures positive for Enterococcus.  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  She was treated with 6 weeks of intravenous antibiotics (initially penicillin and ceftriaxone) followed by 3-month course of suppressive cefadroxil which ended in November 2021.  Our group saw her in the hospital about a month ago when she was admitted and had a TTE that showed a mitral valve vegetation.  However, her blood culture was only positive for coagulase-negative staph contaminant.  Mitral valve vegetation and clinical history were consistent with previously treated endocarditis.    She presented to the emergency room on 3/31/2024 with shortness of breath and cough.  No alleviating factors.  She says she could not quite put her finger on what was wrong.  She says that she was treated as an outpatient with doxycycline and prednisone for pneumonia without any benefit.  In the emergency room she was afebrile with a normal heart rate and slightly elevated blood pressure.  Labs were notable for WBC 12 and procalcitonin 0.1.  RPP was negative and lactate was normal at 0.8.  She was started on empiric levofloxacin as her chest x-ray was read as having a patchy infiltrate in the right midlung.  This " was later changed to cefepime by pulmonary team.    Today, infectious diseases has been consulted because her blood cultures are positive for Enterococcus in 2 of 2 sets.    Pulmonary is following and was concerned about aspiration but so far looks like her esophagram was normal.    Now her antibiotics have been changed to Unasyn and ceftriaxone.    Past Medical History:   Diagnosis Date    VAN (acute kidney injury)     Anemia     Asthma     Atrial flutter     cardioversion    Cataract     Celiac artery stenosis     Chronic respiratory failure with hypoxia     Colon polyp     COPD (chronic obstructive pulmonary disease)     GI bleed     Hiatal hernia     History of CHF (congestive heart failure)     due to MR    History of home oxygen therapy     3 lpm NC    History of mitral valve replacement with tissue graft     Hyperlipidemia     Hypertension     Infectious viral hepatitis     B    Intertrigo     Long term (current) use of anticoagulants     Mitral regurgitation     s/p tissue MVR    PAF (paroxysmal atrial fibrillation)     s/p MAZE    Pneumonia     Pulmonary hypertension     S/P TVR (tricuspid valve repair) 7/7/2016       Past Surgical History:   Procedure Laterality Date    CARDIAC CATHETERIZATION  09/01/2014    Right dominant systemt, normal coronary arteries.     CARDIAC CATHETERIZATION Left 6/10/2016    Procedure: Cardiac catheterization;  Surgeon: Sergei Hall MD;  Location: [facility] CATH INVASIVE LOCATION;  Service:     CARDIAC CATHETERIZATION N/A 6/10/2016    Procedure: Right Heart Cath;  Surgeon: Sergei Hall MD;  Location: [facility] CATH INVASIVE LOCATION;  Service:     CATARACT EXTRACTION      COLONOSCOPY      COLONOSCOPY N/A 8/4/2017    Procedure: COLONOSCOPY TO CECUM/TI WITH POLYPECTOMY ( COLD BX);  Surgeon: Cleveland Devine MD;  Location: [facility] ENDOSCOPY;  Service:     COLONOSCOPY N/A 8/10/2017    Procedure: COLONOSCOPY to cecum and TI with 2 clips placed at transverse;  Surgeon: Earnest PALOMO MD;   Location: [facility] ENDOSCOPY;  Service:     COLONOSCOPY N/A 12/22/2017    Procedure: COLONOSCOPY INTO CECUM WITH COLD POLYPECTOMIES;  Surgeon: Cleveland Devine MD;  Location: [facility] ENDOSCOPY;  Service:     COLONOSCOPY N/A 5/17/2021    Procedure: COLONOSCOPY to cecum;  Surgeon: Cleveland Devine MD;  Location: [facility] ENDOSCOPY;  Service: Gastroenterology;  Laterality: N/A;  Pre: Fe deficency anemia, h/x of polyps  Post: fair prep, normal    ENDOSCOPY N/A 8/17/2017    Procedure: ESOPHAGOGASTRODUODENOSCOPY;  Surgeon: Porsha Ruby MD;  Location: [facility] ENDOSCOPY;  Service:     ENDOSCOPY N/A 12/22/2017    Procedure: ESOPHAGOGASTRODUODENOSCOPY WITH BIOPSIES;  Surgeon: Cleveland Devine MD;  Location: [facility] ENDOSCOPY;  Service:     ENDOSCOPY N/A 5/17/2021    Procedure: ESOPHAGOGASTRODUODENOSCOPY  with biopsies;  Surgeon: Cleveland Devine MD;  Location: 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 Cephalexin - itching many years ago; tolerates ceftriaxone and cefadroxil  2. Penicillin - rash, itching; currently tolerating ampicillin    Medications:    Current Facility-Administered Medications:     acetaminophen (TYLENOL) tablet 650 mg, 650 mg, Oral, Q4H PRN **OR** acetaminophen (TYLENOL) 160 MG/5ML oral solution 650 mg, 650 mg, Oral, Q4H PRN **OR** acetaminophen (TYLENOL) suppository 650 mg, 650 mg, Rectal, Q4H PRN, Rashmi Moran APRN    amLODIPine (NORVASC) tablet 10 mg, 10 mg, Oral, Daily, Gil Nicole MD, 10 mg at 04/02/24 0834     ampicillin-sulbactam (UNASYN) 3 g in sodium chloride 0.9 % 100 mL MBP, 3 g, Intravenous, Q6H, Rashmi Moran APRN, Last Rate: 0 mL/hr at 04/02/24 0551, 3 g at 04/02/24 0839    apixaban (ELIQUIS) tablet 5 mg, 5 mg, Oral, Q12H, Gil Nicole MD, 5 mg at 04/02/24 0834    atorvastatin (LIPITOR) tablet 40 mg, 40 mg, Oral, Nightly, Gil Nicole MD, 40 mg at 04/01/24 2105    sennosides-docusate (PERICOLACE) 8.6-50 MG per tablet 2 tablet, 2 tablet, Oral, BID PRN **AND** [DISCONTINUED] polyethylene glycol (MIRALAX) packet 17 g, 17 g, Oral, Daily PRN **AND** bisacodyl (DULCOLAX) EC tablet 5 mg, 5 mg, Oral, Daily PRN **AND** bisacodyl (DULCOLAX) suppository 10 mg, 10 mg, Rectal, Daily PRN, Rashmi Moran, APRN    budesonide (PULMICORT) nebulizer solution 0.5 mg, 0.5 mg, Nebulization, BID - RT, Gil Nicole MD, 0.5 mg at 04/02/24 0701    carvedilol (COREG) tablet 6.25 mg, 6.25 mg, Oral, BID With Meals, Gil Nicole MD, 6.25 mg at 04/02/24 0835    cefTRIAXone (ROCEPHIN) 2,000 mg in sodium chloride 0.9 % 100 mL MBP, 2,000 mg, Intravenous, Q12H, Rashmi Moran APRN, Last Rate: 200 mL/hr at 04/02/24 0834, 2,000 mg at 04/02/24 0834    cetirizine (zyrTEC) tablet 10 mg, 10 mg, Oral, Daily, Gil Nicole MD, 10 mg at 04/02/24 0834    cyclobenzaprine (FLEXERIL) tablet 10 mg, 10 mg, Oral, Nightly, Gil Nicole MD, 10 mg at 04/01/24 2105    furosemide (LASIX) tablet 40 mg, 40 mg, Oral, BID, Gil Nicole MD, 40 mg at 04/02/24 0834    HYDROcodone-acetaminophen (NORCO) 7.5-325 MG per tablet 1 tablet, 1 tablet, Oral, Q6H PRN, Nick Tovar MD, 1 tablet at 04/02/24 0419    ipratropium-albuterol (DUO-NEB) nebulizer solution 3 mL, 3 mL, Nebulization, 4x Daily - RT, Rashmi Moran, APRN, 3 mL at 04/02/24 0700    lisinopril (PRINIVIL,ZESTRIL) tablet 40 mg, 40 mg, Oral, Daily, Gil Nicole MD, 40 mg at 04/02/24 0834    Magnesium Oxide -Mg Supplement tablet 400  mg, 1 tablet, Oral, Daily, Gil Nicole MD, 400 mg at 04/02/24 0834    multivitamin with minerals 1 tablet, 1 tablet, Oral, Daily, Gil Nicole MD, 1 tablet at 04/02/24 0834    nitroglycerin (NITROSTAT) SL tablet 0.4 mg, 0.4 mg, Sublingual, Q5 Min PRN, Rashmi Moran APRN    O2 (OXYGEN), 2 L/min, Inhalation, Continuous, Gil Nicole MD, Last Rate: 120,000 mL/hr at 04/02/24 0659, 2 L/min at 04/02/24 0659    ondansetron (ZOFRAN) injection 4 mg, 4 mg, Intravenous, Q6H PRN, Rashmi Moran APRN, 4 mg at 04/01/24 2223    pantoprazole (PROTONIX) EC tablet 40 mg, 40 mg, Oral, Q AM, Gil Nicole MD, 40 mg at 04/02/24 0419    Pharmacy to Dose ampicillin-sulbactam, , Does not apply, Continuous PRN, Rashmi Moran, APRN    polyethylene glycol (MIRALAX) packet 17 g, 17 g, Oral, BID PRN, Gil Nicole MD    potassium chloride (K-DUR,KLOR-CON) ER tablet 20 mEq, 20 mEq, Oral, Daily, Gil Nicole MD, 20 mEq at 04/02/24 0834    roflumilast (DALIRESP) tablet 500 mcg, 500 mcg, Oral, Daily, Gil Nicole MD, 500 mcg at 04/02/24 0834    rOPINIRole (REQUIP) tablet 2 mg, 2 mg, Oral, Nightly, Gil Nicole MD, 2 mg at 04/01/24 2107    sertraline (ZOLOFT) tablet 100 mg, 100 mg, Oral, Daily, Gil Nicole MD, 100 mg at 04/02/24 0834    sodium chloride 0.9 % flush 10 mL, 10 mL, Intravenous, PRN, Gil Nicole MD    sodium chloride 0.9 % flush 10 mL, 10 mL, Intravenous, Q12H, Rashmi Moran, APRN, 10 mL at 04/02/24 0835    sodium chloride 0.9 % flush 10 mL, 10 mL, Intravenous, PRN, Rashmi Moran, BA    sodium chloride 0.9 % infusion 40 mL, 40 mL, Intravenous, PRN, Rashmi Moran, APRN    zolpidem (AMBIEN) tablet 10 mg, 10 mg, Oral, Nightly PRN, Gil Nicole MD, 10 mg at 04/01/24 2111      Objective   Vital Signs   Temp:  [97.5 °F (36.4 °C)-98.8 °F (37.1 °C)] 98.8 °F (37.1 °C)  Heart Rate:  [] 92  Resp:  [16-20] 18  BP:  (107-162)/(48-71) 108/67    Physical Exam:   General: awake, alert, NAD, very nice, sitting in chair  Eyes: no scleral icterus  ENT: no thrush  Cardiovascular: NR, no murmur  Respiratory: normal work of breathing on 2 L oxygen via nasal cannula  GI: Abdomen is soft, not distended  :  no Vargas catheter  Neurological: Alert and oriented x 3  Psychiatric: Normal mood and affect   Vasc: PIV w/o erythema    Labs:     Lab Results   Component Value Date    WBC 9.55 04/02/2024    HGB 7.8 (L) 04/02/2024    HCT 24.8 (L) 04/02/2024    MCV 84.4 04/02/2024     04/02/2024       Lab Results   Component Value Date    GLUCOSE 117 (H) 04/02/2024    BUN 7 (L) 04/02/2024    CREATININE 0.81 04/02/2024    EGFRIFNONA 61 08/16/2021    EGFRIFAFRI 86 07/12/2021    BCR 8.6 04/02/2024    CO2 28.4 04/02/2024    CALCIUM 8.5 (L) 04/02/2024    ALBUMIN 3.6 03/31/2024    AST 19 03/31/2024    ALT 20 03/31/2024     Procalcitonin 0.1  Lactate 0.8   BNP 2009      Microbiology:  3/31 RPP: Negative  3/31 BCx: Enterococcus faecalis (not VRE) in 2/2 sets  4/1 MRSA nares PCR: Negative    Radiology:  CXR personally reviewed and looks pretty clear on my read; radiologist mentions patchy infiltrate in the right midlung    4/2 TTE pending    3/3 TTE: Bioprosthetic mitral valve present with a moderate, not mobile mass on the posterior leaflet consistent with vegetation    ASSESSMENT/PLAN:  Enterococcus septicemia  Mitral valve replaced  History of tricuspid valve repair  Penicillin allergy -tolerating ampicillin  COPD due to history of tobacco abuse  Pulmonary hypertension  Paroxysmal atrial fibrillation  Hiatal hernia  Anemia    Concerning combination of findings with prosthetic mitral valve and Enterococcus septicemia and 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  We will start with a transthoracic echocardiogram and if necessary consult cardiology for  transesophageal echocardiogram.    I will continue her ceftriaxone 2 g IV every 12 hours and change Unasyn to ampicillin 2 g IV every 4 hours.  Exact duration remains to be determined.    I will repeat blood cultures in the morning to document sterility.    Check CBC and BMP in the a.m. for close monitoring while on broad-spectrum antibiotics.    ID will follow.

## 2024-04-02 NOTE — PROGRESS NOTES
LOS: 0 days   Patient Care Team:  Javan Martinez MD as PCP - General (Internal Medicine)  Ag Melo Jr., MD as Consulting Physician (Pulmonary Disease)  Cleveland Devine MD as Consulting Physician (Gastroenterology)  Earnest Gan MD as Consulting Physician (Cardiology)  Daniela Shin MD PhD as Consulting Physician (Hematology and Oncology)    Subjective     Following for COPD with acute exacerbation and chronic respiratory failure.  2 of 2 blood cultures came back positive for Enterococcus faecalis now on Unasyn and Rocephin  And says she is weak and tired but not really any other specific complaints some cough but no sputum  Review of Systems:          Objective     Vital Signs  Vital Sign Min/Max for last 24 hours  Temp  Min: 97.5 °F (36.4 °C)  Max: 98.8 °F (37.1 °C)   BP  Min: 107/60  Max: 162/71   Pulse  Min: 80  Max: 104   Resp  Min: 16  Max: 20   SpO2  Min: 94 %  Max: 99 %   Flow (L/min)  Min: 2  Max: 2.5   No data recorded        Ventilator/Non-Invasive Ventilation Settings (From admission, onward)      None                         Body mass index is 20.37 kg/m².  I/O last 3 completed shifts:  In: 510 [P.O.:360; IV Piggyback:150]  Out: -   No intake/output data recorded.        Physical Exam:    General Appearance: Older white female she is being up in a chair in no distress  Eyes: Conjunctiva are clear and anicteric  ENT: Mucous membranes are moist no erythema or exudates, Mallampati type II airway, nasal septum midline  Neck: No adenopathy or thyromegaly no jugular venous tension trachea midline  Lungs: Decreased overall but I do not hear any wheezes rales or rhonchi no dullness symmetric expansion she is not labored on 2 L O2  Cardiac: Regular rate rhythm no murmur  Abdomen: Soft nontender no palpable hepatosplenomegaly or masses  : Not examined  Musculoskeletal: Grossly normal  Skin: Warm and dry no jaundice no petechiae  Neuro: She is alert and oriented she is cooperative  following commands and grossly intact  Extremities/P Vascular: No clubbing no cyanosis no edema palpable radial and dorsalis pedis pulses  MSE: Pleasant lady she is appropriate     Labs:  Results from last 7 days   Lab Units 04/02/24  0526 04/01/24  0610 03/31/24  2127   GLUCOSE mg/dL 117* 94 123*   SODIUM mmol/L 140 139 141   POTASSIUM mmol/L 3.6 3.7 4.1   CO2 mmol/L 28.4 30.0* 30.0*   CHLORIDE mmol/L 101 101 101   ANION GAP mmol/L 10.6 8.0 10.0   CREATININE mg/dL 0.81 0.68 0.76   BUN mg/dL 7* 6* 9   BUN / CREAT RATIO  8.6 8.8 11.8   CALCIUM mg/dL 8.5* 8.9 9.7   ALK PHOS U/L  --   --  94   TOTAL PROTEIN g/dL  --   --  6.6   ALT (SGPT) U/L  --   --  20   AST (SGOT) U/L  --   --  19   BILIRUBIN mg/dL  --   --  0.6   ALBUMIN g/dL  --   --  3.6   GLOBULIN gm/dL  --   --  3.0     Estimated Creatinine Clearance: 60.2 mL/min (by C-G formula based on SCr of 0.81 mg/dL).      Results from last 7 days   Lab Units 04/02/24  0526 04/01/24  0610 03/31/24  2127   WBC 10*3/mm3 9.55 11.24* 12.77*   RBC 10*6/mm3 2.94* 3.10* 3.43*   HEMOGLOBIN g/dL 7.8* 8.2* 9.4*   HEMATOCRIT % 24.8* 25.8* 29.4*   MCV fL 84.4 83.2 85.7   MCH pg 26.5* 26.5* 27.4   MCHC g/dL 31.5 31.8 32.0   RDW % 14.6 14.5 14.5   RDW-SD fl 44.7 43.4 44.9   MPV fL 10.3 10.4 10.0   PLATELETS 10*3/mm3 245 246 268   NEUTROPHIL % % 82.5* 87.3* 85.8*   LYMPHOCYTE % % 9.5* 6.8* 7.9*   MONOCYTES % % 5.5 4.3* 4.3*   EOSINOPHIL % % 1.0 0.4 0.9   BASOPHIL % % 0.3 0.2 0.2   IMM GRAN % % 1.2* 1.0* 0.9*   NEUTROS ABS 10*3/mm3 7.87* 9.82* 10.96*   LYMPHS ABS 10*3/mm3 0.91 0.76 1.01   MONOS ABS 10*3/mm3 0.53 0.48 0.55   EOS ABS 10*3/mm3 0.10 0.05 0.11   BASOS ABS 10*3/mm3 0.03 0.02 0.03   IMMATURE GRANS (ABS) 10*3/mm3 0.11* 0.11* 0.11*   NRBC /100 WBC 0.0 0.0 0.0         Results from last 7 days   Lab Units 03/31/24 2127   HSTROP T ng/L 23*     Results from last 7 days   Lab Units 03/31/24 2127   PROBNP pg/mL 2,009.0*         Results from last 7 days   Lab Units 04/02/24  0549  03/31/24 2223 03/31/24 2127   LACTATE mmol/L  --  0.8  --    PROCALCITONIN ng/mL 0.10  --  0.10         Microbiology Results (last 10 days)       Procedure Component Value - Date/Time    MRSA Screen, PCR (Inpatient) - Swab, Nares [[id number]]  (Normal) Collected: 04/01/24 1047    Lab Status: Final result Specimen: Swab from Nares Updated: 04/01/24 1216     MRSA PCR No MRSA Detected    Narrative:      The negative predictive value of this diagnostic test is high and should only be used to consider de-escalating anti-MRSA therapy. A positive result may indicate colonization with MRSA and must be correlated clinically.    Blood Culture - Blood, Arm, Left [[id number]]  (Abnormal) Collected: 03/31/24 2325    Lab Status: Preliminary result Specimen: Blood from Arm, Left Updated: 04/02/24 0609     Blood Culture Enterococcus species     Comment:   Infectious disease consultation is highly recommended.        Isolated from Aerobic and Anaerobic Bottles     Gram Stain Aerobic Bottle Gram positive cocci in pairs      Anaerobic Bottle Gram positive cocci in pairs    Blood Culture - Blood, Arm, Right [[id number]]  (Abnormal) Collected: 03/31/24 2325    Lab Status: Preliminary result Specimen: Blood from Arm, Right Updated: 04/02/24 0610     Blood Culture Enterococcus species     Comment:   Infectious disease consultation is highly recommended.        Isolated from Aerobic and Anaerobic Bottles     Gram Stain Anaerobic Bottle Gram positive cocci in pairs      Aerobic Bottle Gram positive cocci in pairs    Blood Culture ID, PCR - Blood, Arm, Left [[id number]]  (Abnormal) Collected: 03/31/24 2325    Lab Status: Final result Specimen: Blood from Arm, Left Updated: 04/01/24 1905     BCID, PCR Enterococcus faecalis. Farhad/B (vancomycin resistance gene) not detected. Identification by BCID2 PCR.     BOTTLE TYPE Anaerobic Bottle    Narrative:      Infectious disease consultation is highly recommended to rule out distant foci of infection.     Respiratory Panel PCR w/COVID-19(SARS-CoV-2) KAJAL/MARILIN/DEBBIE/PAD/COR/DAVID In-House, NP Swab in UTM/VTM, 2 HR TAT - Swab, Nasopharynx [[id number]]  (Normal) Collected: 03/31/24 2222    Lab Status: Final result Specimen: Swab from Nasopharynx Updated: 03/31/24 2321     ADENOVIRUS, PCR Not Detected     Coronavirus 229E Not Detected     Coronavirus HKU1 Not Detected     Coronavirus NL63 Not Detected     Coronavirus OC43 Not Detected     COVID19 Not Detected     Human Metapneumovirus Not Detected     Human Rhinovirus/Enterovirus Not Detected     Influenza A PCR Not Detected     Influenza B PCR Not Detected     Parainfluenza Virus 1 Not Detected     Parainfluenza Virus 2 Not Detected     Parainfluenza Virus 3 Not Detected     Parainfluenza Virus 4 Not Detected     RSV, PCR Not Detected     Bordetella pertussis pcr Not Detected     Bordetella parapertussis PCR Not Detected     Chlamydophila pneumoniae PCR Not Detected     Mycoplasma pneumo by PCR Not Detected    Narrative:      In the setting of a positive respiratory panel with a viral infection PLUS a negative procalcitonin without other underlying concern for bacterial infection, consider observing off antibiotics or discontinuation of antibiotics and continue supportive care. If the respiratory panel is positive for atypical bacterial infection (Bordetella pertussis, Chlamydophila pneumoniae, or Mycoplasma pneumoniae), consider antibiotic de-escalation to target atypical bacterial infection.                amLODIPine, 10 mg, Oral, Daily  ampicillin-sulbactam, 3 g, Intravenous, Q6H  apixaban, 5 mg, Oral, Q12H  atorvastatin, 40 mg, Oral, Nightly  budesonide, 0.5 mg, Nebulization, BID - RT  carvedilol, 6.25 mg, Oral, BID With Meals  cefTRIAXone, 2,000 mg, Intravenous, Q12H  cetirizine, 10 mg, Oral, Daily  cyclobenzaprine, 10 mg, Oral, Nightly  furosemide, 40 mg, Oral, BID  ipratropium-albuterol, 3 mL, Nebulization, 4x Daily - RT  lisinopril, 40 mg, Oral, Daily  Magnesium  Oxide -Mg Supplement, 1 tablet, Oral, Daily  multivitamin with minerals, 1 tablet, Oral, Daily  pantoprazole, 40 mg, Oral, Q AM  potassium chloride, 20 mEq, Oral, Daily  roflumilast, 500 mcg, Oral, Daily  rOPINIRole, 2 mg, Oral, Nightly  sertraline, 100 mg, Oral, Daily  sodium chloride, 10 mL, Intravenous, Q12H      O2, 2 L/min, Last Rate: 2 L/min (04/02/24 0659)  Pharmacy to Dose ampicillin-sulbactam,         Diagnostics:  FL ESOPHAGRAM SINGLE CONTRAST    Result Date: 4/1/2024  ESOPHAGRAM  HISTORY: Recurrent pneumonia. Some dysphagia and reflux symptoms.  FINDINGS: An initial PA view of the chest was obtained. The lungs are well expanded with some scattered chronic interstitial changes and scarring, particularly in the upper lobes that is similar to the study of 06/09/2016. The heart is top normal in size with sternal wires from previous cardiac surgery.  The patient swallowed sips of barium without difficulty. There is normal distensibility of the hypopharynx and cervical esophagus. The remainder of the esophagus is also normal in appearance. There is no hiatus hernia. No reflux occurs during the water siphon test.  CONCLUSION: Negative esophagram. 95 images were obtained and the fluoroscopy time measures 30 seconds. The dose Kerma measures 5 mGy.  This report was finalized on 4/1/2024 4:05 PM by Dr. Isaías Garza M.D on Workstation: [id number]      XR Chest 1 View    Result Date: 3/31/2024  SINGLE VIEW OF THE CHEST  HISTORY: Shortness of breath  COMPARISON: March 23, 2024  FINDINGS: There is cardiomegaly. Patient is status post median sternotomy. There are background emphysematous changes. When compared to prior study, patient appears to have some patchy infiltrate within the right midlung. There may be some additional mild consolidation at the left lung base. No pneumothorax or large effusion is seen.      Patchy infiltrate suspected within the right midlung. There may be some additional involvement at the left  lung base.  This report was finalized on 3/31/2024 10:29 PM by Dr. Leigh Parr M.D on Workstation: [id number]      XR Chest 1 View    Result Date: 3/23/2024  XR CHEST 1 VW-  HISTORY: Female who is 68 years-old, short of breath  TECHNIQUE: Frontal view of the chest  COMPARISON: 3/2/2024  FINDINGS: The heart size is borderline. Sternotomy wires are present. Pulmonary vasculature is unremarkable. Aorta is calcified. No focal pulmonary consolidation, pleural effusion, or pneumothorax. No acute osseous process.      No focal pulmonary consolidation. Borderline heart size. Follow-up as clinical indications persist.  This report was finalized on 3/23/2024 7:33 AM by Dr. Chapincito Garcia M.D on Workstation: [id number]      Adult Transthoracic Echo Complete w/ Color, Spectral and Contrast if Necessary Per Protocol    Result Date: 3/3/2024    Left ventricular systolic function is hyperdynamic (EF > 70%). Calculated left ventricular EF = 73.7%   Left ventricular wall thickness is consistent with concentric hypertrophy.   Left ventricular diastolic function was indeterminate.   There is a bioprosthetic mitral valve present. There is a moderate, non-mobile mass on the posterior leaflet(s) of the prosthetic mitral valve that is consistent with a vegetation.   Mild aortic valve stenosis is present. Aortic valve area is 1.8 cm2.   Aortic valve maximum pressure gradient is 15 mmHg. Aortic valve mean pressure gradient is 9 mmHg.   Moderate mitral valve stenosis is present. The mitral valve peak gradient is 21 mmHg. The mitral valve mean gradient is 9 mmHg.   Mild mitral valve regurgitation is present.   Mild tricuspid valve regurgitation is present.   Estimated right ventricular systolic pressure from tricuspid regurgitation is mildly elevated (35-45 mmHg). Calculated right ventricular systolic pressure from tricuspid regurgitation is 41 mmHg.     Results for orders placed during the hospital encounter of 03/02/24    Adult  Transthoracic Echo Complete w/ Color, Spectral and Contrast if Necessary Per Protocol    Interpretation Summary    Left ventricular systolic function is hyperdynamic (EF > 70%). Calculated left ventricular EF = 73.7%    Left ventricular wall thickness is consistent with concentric hypertrophy.    Left ventricular diastolic function was indeterminate.    There is a bioprosthetic mitral valve present. There is a moderate, non-mobile mass on the posterior leaflet(s) of the prosthetic mitral valve that is consistent with a vegetation.    Mild aortic valve stenosis is present. Aortic valve area is 1.8 cm2.    Aortic valve maximum pressure gradient is 15 mmHg. Aortic valve mean pressure gradient is 9 mmHg.    Moderate mitral valve stenosis is present. The mitral valve peak gradient is 21 mmHg. The mitral valve mean gradient is 9 mmHg.    Mild mitral valve regurgitation is present.    Mild tricuspid valve regurgitation is present.    Estimated right ventricular systolic pressure from tricuspid regurgitation is mildly elevated (35-45 mmHg). Calculated right ventricular systolic pressure from tricuspid regurgitation is 41 mmHg.          Active Hospital Problems    Diagnosis  POA    **Pneumonia [J18.9]  Yes    Acute-on-chronic respiratory failure [J96.20]  Yes    COPD exacerbation [J44.1]  Yes    Chronic diastolic CHF (congestive heart failure) [I50.32]  Yes    Iron deficiency anemia [D50.9]  Yes    Chronic hypoxemic respiratory failure [J96.11]  Yes    S/P TVR (tricuspid valve repair) [Z98.890]  Not Applicable    Hypertension [I10]  Yes    PAF (paroxysmal atrial fibrillation) [I48.0]  Yes      Resolved Hospital Problems   No resolved problems to display.         Assessment & Plan     Pneumonia new infiltrates her chest x-ray was pretty clear a week ago sounds like she is having recurrent episodes the respiratory pathogen panel is negative blood cultures Enterococcus faecalis.   recurrent infiltrates raises the question of  aspiration esophagram was normal swallow pending.  The Enterococcus could come from her lung but that is not the most common source.  Continue antibiotics  Enterococcus faecalis bacteremia worry with her prosthetic valves for possible endocarditis echocardiogram pending I would consider ID consultation.  Discussed with Dr. Nicole  COPD do not think she is in an acute exacerbation do not think systemic steroids are indicated at this time continue bronchodilators  Chronic respiratory failure with hypoxia  Pulmonary hypertension by echocardiogram mild to moderate  Valvular heart disease prior mitral valve replacement with tissue valve and tricuspid valve repair  Paroxysmal A-fib status post Maze procedure  Hypertension  Hyperlipidemia  Hiatal hernia has had some epigastric and chest pain and some pain up and down the middle of her chest some swallowing issues I am going to ask for an esophagram as well  Anemia looks like may be a combination of iron deficiency and chronic disease    Plan for disposition:    Ag Melo Jr, MD  04/02/24  08:25 EDT    Time:

## 2024-04-03 ENCOUNTER — APPOINTMENT (OUTPATIENT)
Dept: CARDIOLOGY | Facility: HOSPITAL | Age: 69
DRG: 314 | End: 2024-04-03
Payer: MEDICARE

## 2024-04-03 LAB
ALBUMIN SERPL-MCNC: 2.9 G/DL (ref 3.5–5.2)
ALBUMIN/GLOB SERPL: 1.1 G/DL
ALP SERPL-CCNC: 69 U/L (ref 39–117)
ALT SERPL W P-5'-P-CCNC: 12 U/L (ref 1–33)
ANION GAP SERPL CALCULATED.3IONS-SCNC: 9.9 MMOL/L (ref 5–15)
AORTIC DIMENSIONLESS INDEX: 0.9 (DI)
AST SERPL-CCNC: 18 U/L (ref 1–32)
BACTERIA SPEC AEROBE CULT: ABNORMAL
BACTERIA SPEC AEROBE CULT: ABNORMAL
BASOPHILS # BLD AUTO: 0.03 10*3/MM3 (ref 0–0.2)
BASOPHILS NFR BLD AUTO: 0.3 % (ref 0–1.5)
BH CV ECHO MEAS - ACS: 1.48 CM
BH CV ECHO MEAS - AO MEAN PG: 5.6 MMHG
BH CV ECHO MEAS - AO ROOT DIAM: 2.33 CM
BH CV ECHO MEAS - AO V2 MAX: 137 CM/SEC
BH CV ECHO MEAS - AO V2 VTI: 26.9 CM
BH CV ECHO MEAS - AVA(I,D): 2.02 CM2
BH CV ECHO MEAS - EDV(CUBED): 111.7 ML
BH CV ECHO MEAS - EDV(MOD-SP2): 79 ML
BH CV ECHO MEAS - EDV(MOD-SP4): 76 ML
BH CV ECHO MEAS - EF(MOD-BP): 64 %
BH CV ECHO MEAS - EF(MOD-SP2): 64.6 %
BH CV ECHO MEAS - EF(MOD-SP4): 64.5 %
BH CV ECHO MEAS - ESV(CUBED): 28.1 ML
BH CV ECHO MEAS - ESV(MOD-SP2): 28 ML
BH CV ECHO MEAS - ESV(MOD-SP4): 27 ML
BH CV ECHO MEAS - FS: 36.9 %
BH CV ECHO MEAS - IVS/LVPW: 1.07 CM
BH CV ECHO MEAS - IVSD: 0.93 CM
BH CV ECHO MEAS - LAT PEAK E' VEL: 9 CM/SEC
BH CV ECHO MEAS - LV DIASTOLIC VOL/BSA (35-75): 45.1 CM2
BH CV ECHO MEAS - LV MASS(C)D: 148.8 GRAMS
BH CV ECHO MEAS - LV MEAN PG: 3.4 MMHG
BH CV ECHO MEAS - LV SYSTOLIC VOL/BSA (12-30): 16 CM2
BH CV ECHO MEAS - LV V1 MAX: 126 CM/SEC
BH CV ECHO MEAS - LV V1 VTI: 22.4 CM
BH CV ECHO MEAS - LVIDD: 4.8 CM
BH CV ECHO MEAS - LVIDS: 3 CM
BH CV ECHO MEAS - LVOT AREA: 2.43 CM2
BH CV ECHO MEAS - LVOT DIAM: 1.76 CM
BH CV ECHO MEAS - LVPWD: 0.87 CM
BH CV ECHO MEAS - MED PEAK E' VEL: 7 CM/SEC
BH CV ECHO MEAS - MV DEC SLOPE: 880.8 CM/SEC2
BH CV ECHO MEAS - MV DEC TIME: 0.18 SEC
BH CV ECHO MEAS - MV E MAX VEL: 249 CM/SEC
BH CV ECHO MEAS - MV MEAN PG: 14.7 MMHG
BH CV ECHO MEAS - MV P1/2T: 92.2 MSEC
BH CV ECHO MEAS - MV V2 VTI: 68.6 CM
BH CV ECHO MEAS - MVA(P1/2T): 2.39 CM2
BH CV ECHO MEAS - MVA(VTI): 0.79 CM2
BH CV ECHO MEAS - PA ACC SLOPE: 671.5 CM/SEC2
BH CV ECHO MEAS - PA ACC TIME: 0.11 SEC
BH CV ECHO MEAS - PA V2 MAX: 144 CM/SEC
BH CV ECHO MEAS - PI END-D VEL: 116.7 CM/SEC
BH CV ECHO MEAS - QP/QS: 0.78
BH CV ECHO MEAS - RAP SYSTOLE: 8 MMHG
BH CV ECHO MEAS - RV MAX PG: 2.16 MMHG
BH CV ECHO MEAS - RV V1 MAX: 73.3 CM/SEC
BH CV ECHO MEAS - RV V1 VTI: 15.8 CM
BH CV ECHO MEAS - RVOT DIAM: 1.86 CM
BH CV ECHO MEAS - RVSP: 65 MMHG
BH CV ECHO MEAS - SI(MOD-SP2): 30.3 ML/M2
BH CV ECHO MEAS - SI(MOD-SP4): 29.1 ML/M2
BH CV ECHO MEAS - SV(LVOT): 54.4 ML
BH CV ECHO MEAS - SV(MOD-SP2): 51 ML
BH CV ECHO MEAS - SV(MOD-SP4): 49 ML
BH CV ECHO MEAS - SV(RVOT): 42.7 ML
BH CV ECHO MEAS - TAPSE (>1.6): 2 CM
BH CV ECHO MEAS - TR MAX PG: 57.1 MMHG
BH CV ECHO MEAS - TR MAX VEL: 377.9 CM/SEC
BH CV ECHO MEASUREMENTS AVERAGE E/E' RATIO: 31.13
BH CV XLRA - RV BASE: 3.7 CM
BH CV XLRA - RV LENGTH: 6.5 CM
BH CV XLRA - RV MID: 3.2 CM
BH CV XLRA - TDI S': 9 CM/SEC
BILIRUB SERPL-MCNC: 0.2 MG/DL (ref 0–1.2)
BUN SERPL-MCNC: 7 MG/DL (ref 8–23)
BUN/CREAT SERPL: 8.9 (ref 7–25)
CALCIUM SPEC-SCNC: 8.3 MG/DL (ref 8.6–10.5)
CHLORIDE SERPL-SCNC: 101 MMOL/L (ref 98–107)
CO2 SERPL-SCNC: 31.1 MMOL/L (ref 22–29)
CREAT SERPL-MCNC: 0.79 MG/DL (ref 0.57–1)
DEPRECATED RDW RBC AUTO: 44.1 FL (ref 37–54)
EGFRCR SERPLBLD CKD-EPI 2021: 80.6 ML/MIN/1.73
EOSINOPHIL # BLD AUTO: 0.09 10*3/MM3 (ref 0–0.4)
EOSINOPHIL NFR BLD AUTO: 0.9 % (ref 0.3–6.2)
ERYTHROCYTE [DISTWIDTH] IN BLOOD BY AUTOMATED COUNT: 14.4 % (ref 12.3–15.4)
GLOBULIN UR ELPH-MCNC: 2.7 GM/DL
GLUCOSE SERPL-MCNC: 109 MG/DL (ref 65–99)
GRAM STN SPEC: ABNORMAL
HCT VFR BLD AUTO: 22.4 % (ref 34–46.6)
HGB BLD-MCNC: 7.3 G/DL (ref 12–15.9)
IMM GRANULOCYTES # BLD AUTO: 0.06 10*3/MM3 (ref 0–0.05)
IMM GRANULOCYTES NFR BLD AUTO: 0.6 % (ref 0–0.5)
ISOLATED FROM: ABNORMAL
ISOLATED FROM: ABNORMAL
LEFT ATRIUM VOLUME INDEX: 29.8 ML/M2
LYMPHOCYTES # BLD AUTO: 0.85 10*3/MM3 (ref 0.7–3.1)
LYMPHOCYTES NFR BLD AUTO: 8.5 % (ref 19.6–45.3)
MCH RBC QN AUTO: 27.2 PG (ref 26.6–33)
MCHC RBC AUTO-ENTMCNC: 32.6 G/DL (ref 31.5–35.7)
MCV RBC AUTO: 83.6 FL (ref 79–97)
MONOCYTES # BLD AUTO: 0.54 10*3/MM3 (ref 0.1–0.9)
MONOCYTES NFR BLD AUTO: 5.4 % (ref 5–12)
NEUTROPHILS NFR BLD AUTO: 8.48 10*3/MM3 (ref 1.7–7)
NEUTROPHILS NFR BLD AUTO: 84.3 % (ref 42.7–76)
NRBC BLD AUTO-RTO: 0 /100 WBC (ref 0–0.2)
PLATELET # BLD AUTO: 213 10*3/MM3 (ref 140–450)
PMV BLD AUTO: 10.4 FL (ref 6–12)
POTASSIUM SERPL-SCNC: 3.2 MMOL/L (ref 3.5–5.2)
PROT SERPL-MCNC: 5.6 G/DL (ref 6–8.5)
RBC # BLD AUTO: 2.68 10*6/MM3 (ref 3.77–5.28)
RETICS # AUTO: 0.1 10*6/MM3 (ref 0.02–0.13)
RETICS/RBC NFR AUTO: 3.9 % (ref 0.7–1.9)
SODIUM SERPL-SCNC: 142 MMOL/L (ref 136–145)
WBC NRBC COR # BLD AUTO: 10.05 10*3/MM3 (ref 3.4–10.8)

## 2024-04-03 PROCEDURE — 85025 COMPLETE CBC W/AUTO DIFF WBC: CPT | Performed by: HOSPITALIST

## 2024-04-03 PROCEDURE — 93306 TTE W/DOPPLER COMPLETE: CPT | Performed by: INTERNAL MEDICINE

## 2024-04-03 PROCEDURE — 97530 THERAPEUTIC ACTIVITIES: CPT

## 2024-04-03 PROCEDURE — 85045 AUTOMATED RETICULOCYTE COUNT: CPT | Performed by: HOSPITALIST

## 2024-04-03 PROCEDURE — 25510000001 PERFLUTREN (DEFINITY) 8.476 MG IN SODIUM CHLORIDE (PF) 0.9 % 10 ML INJECTION: Performed by: NURSE PRACTITIONER

## 2024-04-03 PROCEDURE — 25010000002 AMPICILLIN PER 500 MG: Performed by: INTERNAL MEDICINE

## 2024-04-03 PROCEDURE — 80053 COMPREHEN METABOLIC PANEL: CPT | Performed by: HOSPITALIST

## 2024-04-03 PROCEDURE — 99232 SBSQ HOSP IP/OBS MODERATE 35: CPT | Performed by: INTERNAL MEDICINE

## 2024-04-03 PROCEDURE — 94799 UNLISTED PULMONARY SVC/PX: CPT

## 2024-04-03 PROCEDURE — 93306 TTE W/DOPPLER COMPLETE: CPT

## 2024-04-03 PROCEDURE — 99222 1ST HOSP IP/OBS MODERATE 55: CPT | Performed by: INTERNAL MEDICINE

## 2024-04-03 PROCEDURE — 94761 N-INVAS EAR/PLS OXIMETRY MLT: CPT

## 2024-04-03 PROCEDURE — 25010000002 CEFTRIAXONE PER 250 MG: Performed by: NURSE PRACTITIONER

## 2024-04-03 PROCEDURE — 94664 DEMO&/EVAL PT USE INHALER: CPT

## 2024-04-03 PROCEDURE — 25010000002 ONDANSETRON PER 1 MG: Performed by: NURSE PRACTITIONER

## 2024-04-03 RX ORDER — POTASSIUM CHLORIDE 1.5 G/1.58G
40 POWDER, FOR SOLUTION ORAL ONCE
Status: COMPLETED | OUTPATIENT
Start: 2024-04-03 | End: 2024-04-03

## 2024-04-03 RX ORDER — ALBUTEROL SULFATE 2.5 MG/3ML
2.5 SOLUTION RESPIRATORY (INHALATION) EVERY 6 HOURS PRN
Status: DISCONTINUED | OUTPATIENT
Start: 2024-04-03 | End: 2024-04-06 | Stop reason: HOSPADM

## 2024-04-03 RX ADMIN — ROPINIROLE 2 MG: 2 TABLET, FILM COATED ORAL at 20:12

## 2024-04-03 RX ADMIN — PERFLUTREN 3 ML: 6.52 INJECTION, SUSPENSION INTRAVENOUS at 09:13

## 2024-04-03 RX ADMIN — CARVEDILOL 6.25 MG: 6.25 TABLET, FILM COATED ORAL at 17:18

## 2024-04-03 RX ADMIN — CETIRIZINE HYDROCHLORIDE 10 MG: 10 TABLET ORAL at 09:28

## 2024-04-03 RX ADMIN — HYDROCODONE BITARTRATE AND ACETAMINOPHEN 1 TABLET: 7.5; 325 TABLET ORAL at 14:19

## 2024-04-03 RX ADMIN — POTASSIUM CHLORIDE 20 MEQ: 750 TABLET, EXTENDED RELEASE ORAL at 09:28

## 2024-04-03 RX ADMIN — ALBUTEROL SULFATE 2.5 MG: 2.5 SOLUTION RESPIRATORY (INHALATION) at 04:20

## 2024-04-03 RX ADMIN — IPRATROPIUM BROMIDE AND ALBUTEROL SULFATE 3 ML: .5; 3 SOLUTION RESPIRATORY (INHALATION) at 15:52

## 2024-04-03 RX ADMIN — ALBUTEROL SULFATE 2.5 MG: 2.5 SOLUTION RESPIRATORY (INHALATION) at 23:14

## 2024-04-03 RX ADMIN — AMPICILLIN SODIUM 2 G: 2 INJECTION, POWDER, FOR SOLUTION INTRAVENOUS at 17:19

## 2024-04-03 RX ADMIN — AMPICILLIN SODIUM 2 G: 2 INJECTION, POWDER, FOR SOLUTION INTRAVENOUS at 03:39

## 2024-04-03 RX ADMIN — ONDANSETRON HYDROCHLORIDE 4 MG: 2 INJECTION, SOLUTION INTRAMUSCULAR; INTRAVENOUS at 22:12

## 2024-04-03 RX ADMIN — HYDROCODONE BITARTRATE AND ACETAMINOPHEN 1 TABLET: 7.5; 325 TABLET ORAL at 20:11

## 2024-04-03 RX ADMIN — BUDESONIDE 0.5 MG: 0.5 INHALANT RESPIRATORY (INHALATION) at 09:33

## 2024-04-03 RX ADMIN — CEFTRIAXONE 2000 MG: 2 INJECTION, POWDER, FOR SOLUTION INTRAMUSCULAR; INTRAVENOUS at 20:51

## 2024-04-03 RX ADMIN — APIXABAN 5 MG: 5 TABLET, FILM COATED ORAL at 09:28

## 2024-04-03 RX ADMIN — AMPICILLIN SODIUM 2 G: 2 INJECTION, POWDER, FOR SOLUTION INTRAVENOUS at 20:11

## 2024-04-03 RX ADMIN — ATORVASTATIN CALCIUM 40 MG: 20 TABLET, FILM COATED ORAL at 20:12

## 2024-04-03 RX ADMIN — APIXABAN 5 MG: 5 TABLET, FILM COATED ORAL at 20:11

## 2024-04-03 RX ADMIN — BUDESONIDE 0.5 MG: 0.5 INHALANT RESPIRATORY (INHALATION) at 19:59

## 2024-04-03 RX ADMIN — AMLODIPINE BESYLATE 10 MG: 10 TABLET ORAL at 09:29

## 2024-04-03 RX ADMIN — POTASSIUM CHLORIDE 40 MEQ: 1.5 POWDER, FOR SOLUTION ORAL at 11:03

## 2024-04-03 RX ADMIN — IPRATROPIUM BROMIDE AND ALBUTEROL SULFATE 3 ML: .5; 3 SOLUTION RESPIRATORY (INHALATION) at 13:11

## 2024-04-03 RX ADMIN — HYDROCODONE BITARTRATE AND ACETAMINOPHEN 1 TABLET: 7.5; 325 TABLET ORAL at 04:21

## 2024-04-03 RX ADMIN — ZOLPIDEM TARTRATE 10 MG: 5 TABLET ORAL at 20:11

## 2024-04-03 RX ADMIN — Medication 10 ML: at 20:12

## 2024-04-03 RX ADMIN — ROFLUMILAST 500 MCG: 500 TABLET ORAL at 09:28

## 2024-04-03 RX ADMIN — SERTRALINE 100 MG: 100 TABLET, FILM COATED ORAL at 09:29

## 2024-04-03 RX ADMIN — FUROSEMIDE 40 MG: 40 TABLET ORAL at 09:29

## 2024-04-03 RX ADMIN — MAGNESIUM OXIDE 400 MG (241.3 MG MAGNESIUM) TABLET 400 MG: TABLET at 09:28

## 2024-04-03 RX ADMIN — IPRATROPIUM BROMIDE AND ALBUTEROL SULFATE 3 ML: .5; 3 SOLUTION RESPIRATORY (INHALATION) at 09:33

## 2024-04-03 RX ADMIN — IPRATROPIUM BROMIDE AND ALBUTEROL SULFATE 3 ML: .5; 3 SOLUTION RESPIRATORY (INHALATION) at 19:59

## 2024-04-03 RX ADMIN — CARVEDILOL 6.25 MG: 6.25 TABLET, FILM COATED ORAL at 09:29

## 2024-04-03 RX ADMIN — PANTOPRAZOLE SODIUM 40 MG: 40 TABLET, DELAYED RELEASE ORAL at 05:41

## 2024-04-03 RX ADMIN — AMPICILLIN SODIUM 2 G: 2 INJECTION, POWDER, FOR SOLUTION INTRAVENOUS at 09:29

## 2024-04-03 RX ADMIN — Medication 1 TABLET: at 09:28

## 2024-04-03 RX ADMIN — AMPICILLIN SODIUM 2 G: 2 INJECTION, POWDER, FOR SOLUTION INTRAVENOUS at 12:20

## 2024-04-03 RX ADMIN — FUROSEMIDE 40 MG: 40 TABLET ORAL at 17:19

## 2024-04-03 RX ADMIN — CYCLOBENZAPRINE 10 MG: 10 TABLET, FILM COATED ORAL at 20:11

## 2024-04-03 RX ADMIN — LISINOPRIL 40 MG: 40 TABLET ORAL at 09:28

## 2024-04-03 RX ADMIN — CEFTRIAXONE 2000 MG: 2 INJECTION, POWDER, FOR SOLUTION INTRAMUSCULAR; INTRAVENOUS at 11:03

## 2024-04-03 NOTE — PLAN OF CARE
Goal Outcome Evaluation:  Plan of Care Reviewed With: patient, son        Progress: improving  Outcome Evaluation: Pt was seen by PT this AM for tx. Pt was in bed and sat up to EOB w/ mod I. Pt stood w/ SBA. Pt then amb approx 60' w/ SV and no AD. Pt was slow w/ no overt LOB. Pt c/o SOA w/ sats at 88% when pt was seated in chair. Pt performed ther ex for strengthening and was UIC at end of session.      Anticipated Discharge Disposition (PT): home with home health, home with assist

## 2024-04-03 NOTE — PROGRESS NOTES
ID progress note    Chief complaint: Follow-up Enterococcus bacteremia    Subjective: She reports some shortness of breath following her echocardiogram this morning.  No fever.  Tolerating ampicillin and ceftriaxone without rash or diarrhea    Medications:    Current Facility-Administered Medications:     acetaminophen (TYLENOL) tablet 650 mg, 650 mg, Oral, Q4H PRN **OR** acetaminophen (TYLENOL) 160 MG/5ML oral solution 650 mg, 650 mg, Oral, Q4H PRN **OR** acetaminophen (TYLENOL) suppository 650 mg, 650 mg, Rectal, Q4H PRN, Rashmi Moran APRN    albuterol (PROVENTIL) nebulizer solution 0.083% 2.5 mg/3mL, 2.5 mg, Nebulization, Q6H PRN, Hanna Narvaez APRN, 2.5 mg at 04/03/24 0420    amLODIPine (NORVASC) tablet 10 mg, 10 mg, Oral, Daily, Gil Nicole MD, 10 mg at 04/02/24 0834    ampicillin 2000 mg IVPB in 100 mL NS (MBP), 2 g, Intravenous, Q4H, Loy Clayton MD, Last Rate: 200 mL/hr at 04/03/24 0339, 2 g at 04/03/24 0339    apixaban (ELIQUIS) tablet 5 mg, 5 mg, Oral, Q12H, Gil Nicole MD, 5 mg at 04/02/24 2004    atorvastatin (LIPITOR) tablet 40 mg, 40 mg, Oral, Nightly, Gil Nicole MD, 40 mg at 04/02/24 2004    sennosides-docusate (PERICOLACE) 8.6-50 MG per tablet 2 tablet, 2 tablet, Oral, BID PRN **AND** [DISCONTINUED] polyethylene glycol (MIRALAX) packet 17 g, 17 g, Oral, Daily PRN **AND** bisacodyl (DULCOLAX) EC tablet 5 mg, 5 mg, Oral, Daily PRN **AND** bisacodyl (DULCOLAX) suppository 10 mg, 10 mg, Rectal, Daily PRN, Rashmi Moran APRN    budesonide (PULMICORT) nebulizer solution 0.5 mg, 0.5 mg, Nebulization, BID - RT, Gil Nicole MD, 0.5 mg at 04/02/24 2011    carvedilol (COREG) tablet 6.25 mg, 6.25 mg, Oral, BID With Meals, Gil Nicole MD, 6.25 mg at 04/02/24 1708    cefTRIAXone (ROCEPHIN) 2,000 mg in sodium chloride 0.9 % 100 mL MBP, 2,000 mg, Intravenous, Q12H, Rashmi oMran, APRN, Last Rate: 200 mL/hr at 04/02/24 2112,  2,000 mg at 04/02/24 2112    cetirizine (zyrTEC) tablet 10 mg, 10 mg, Oral, Daily, Gil Nicole MD, 10 mg at 04/02/24 0834    cyclobenzaprine (FLEXERIL) tablet 10 mg, 10 mg, Oral, Nightly, Gil Nicole MD, 10 mg at 04/02/24 2003    furosemide (LASIX) tablet 40 mg, 40 mg, Oral, BID, Gil Nicole MD, 40 mg at 04/02/24 1708    HYDROcodone-acetaminophen (NORCO) 7.5-325 MG per tablet 1 tablet, 1 tablet, Oral, Q6H PRN, Nick Tovar MD, 1 tablet at 04/03/24 0421    ipratropium-albuterol (DUO-NEB) nebulizer solution 3 mL, 3 mL, Nebulization, 4x Daily - RT, Rashmi Moran APRN, 3 mL at 04/02/24 2007    lisinopril (PRINIVIL,ZESTRIL) tablet 40 mg, 40 mg, Oral, Daily, Gil Nicole MD, 40 mg at 04/02/24 0834    Magnesium Oxide -Mg Supplement tablet 400 mg, 1 tablet, Oral, Daily, Gil Nicole MD, 400 mg at 04/02/24 0834    multivitamin with minerals 1 tablet, 1 tablet, Oral, Daily, Gil Nicole MD, 1 tablet at 04/02/24 0834    nitroglycerin (NITROSTAT) SL tablet 0.4 mg, 0.4 mg, Sublingual, Q5 Min PRN, Rashmi Moran APRN    O2 (OXYGEN), 2 L/min, Inhalation, Continuous, Gil Nicole MD, Last Rate: 120,000 mL/hr at 04/02/24 0659, 2 L/min at 04/02/24 0659    ondansetron (ZOFRAN) injection 4 mg, 4 mg, Intravenous, Q6H PRN, Rashmi Moran APRN, 4 mg at 04/02/24 2021    pantoprazole (PROTONIX) EC tablet 40 mg, 40 mg, Oral, Q AM, Gil Nicole MD, 40 mg at 04/03/24 0541    polyethylene glycol (MIRALAX) packet 17 g, 17 g, Oral, BID PRN, Gil Nicole MD    potassium chloride (K-DUR,KLOR-CON) ER tablet 20 mEq, 20 mEq, Oral, Daily, Gil Nicole MD, 20 mEq at 04/02/24 0834    roflumilast (DALIRESP) tablet 500 mcg, 500 mcg, Oral, Daily, Gil Nicole MD, 500 mcg at 04/02/24 0834    rOPINIRole (REQUIP) tablet 2 mg, 2 mg, Oral, Nightly, Gil Nicole MD, 2 mg at 04/02/24 2004    sertraline (ZOLOFT) tablet 100 mg, 100 mg, Oral,  Daily, Gil Nicole MD, 100 mg at 04/02/24 0834    sodium chloride 0.9 % flush 10 mL, 10 mL, Intravenous, PRN, Gil Nicole MD    sodium chloride 0.9 % flush 10 mL, 10 mL, Intravenous, Q12H, Rashmi Moran APRN, 10 mL at 04/02/24 2003    sodium chloride 0.9 % flush 10 mL, 10 mL, Intravenous, PRN, Rashmi Moran APRN    sodium chloride 0.9 % infusion 40 mL, 40 mL, Intravenous, PRN, Rashmi Moran APRN    zolpidem (AMBIEN) tablet 10 mg, 10 mg, Oral, Nightly PRN, Gil Nicole MD, 10 mg at 04/02/24 2003      Objective   Vital Signs   Temp:  [98.1 °F (36.7 °C)-99.3 °F (37.4 °C)] 99.3 °F (37.4 °C)  Heart Rate:  [] 107  Resp:  [16-24] 16  BP: (129-136)/(49-50) 136/49    Physical Exam:   General: awake, alert, NAD, very nice, sitting up in bed  Eyes: no scleral icterus  ENT: no thrush  Cardiovascular: Tachycardic, healed sternal incision  Respiratory: normal work of breathing without wheezing  GI: Abdomen is not tender  :  no Vargas catheter  Neurological: Alert and oriented x 3  Psychiatric: Normal mood and affect   Vasc: PIV in upper extremity w/o erythema    Labs:   CBC, CMP, and blood cultures reviewed today  Lab Results   Component Value Date    WBC 10.05 04/03/2024    HGB 7.3 (L) 04/03/2024    HCT 22.4 (L) 04/03/2024    MCV 83.6 04/03/2024     04/03/2024       Lab Results   Component Value Date    GLUCOSE 109 (H) 04/03/2024    BUN 7 (L) 04/03/2024    CREATININE 0.79 04/03/2024    EGFRIFNONA 61 08/16/2021    EGFRIFAFRI 86 07/12/2021    BCR 8.9 04/03/2024    CO2 31.1 (H) 04/03/2024    CALCIUM 8.3 (L) 04/03/2024    ALBUMIN 2.9 (L) 04/03/2024    AST 18 04/03/2024    ALT 12 04/03/2024     Procalcitonin 0.1  Lactate 0.8   BNP 2009      Microbiology:  3/31 RPP: Negative  3/31 BCx: Enterococcus faecalis (not VRE) in 2/2 sets (susceptible to ampicillin)  4/1 MRSA nares PCR: Negative  4/2 blood cultures: Pending    Radiology:  Echocardiogram  pending      ASSESSMENT/PLAN:  Enterococcus septicemia  Mitral valve replaced  History of tricuspid valve repair  Penicillin allergy -tolerating ampicillin  COPD due to history of tobacco abuse  Pulmonary hypertension  Paroxysmal atrial fibrillation  Hiatal hernia  Anemia    Concerning combination of findings with prosthetic mitral valve and Enterococcus septicemia and 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  We will start with a transthoracic echocardiogram and if necessary consult cardiology for transesophageal echocardiogram.  This was done this morning and I will follow-up the result.    I will continue her ceftriaxone 2 g IV every 12 hours and  ampicillin 2 g IV every 4 hours.  Exact duration remains to be determined.  She will likely need a PICC line but not until closer to the time of discharge.  Repeat blood cultures were drawn yesterday and I would like for these to be negative for at least 48 hours before placing a PICC line.    Check CBC and BMP in the a.m. for close monitoring while on broad-spectrum antibiotics.    ID will follow.  Discussed with her nurse.

## 2024-04-03 NOTE — PROGRESS NOTES
LOS: 1 day   Patient Care Team:  Javan Martinez MD as PCP - General (Internal Medicine)  Ag Melo Jr., MD as Consulting Physician (Pulmonary Disease)  Cleveland Devine MD as Consulting Physician (Gastroenterology)  Earnest Gan MD as Consulting Physician (Cardiology)  Daniela Shin MD PhD as Consulting Physician (Hematology and Oncology)    Subjective     Following for COPD with acute exacerbation and chronic respiratory failure.  Patient went down for her echocardiogram apparently her oxygen off for a little while and she got very short of breath she is much better now she is on her usual 2 L O2 and saturating in the upper 90s.  She has a little anxiety and she can make her breathing worse by getting anxious she settled right down, we have seen this before with her    Review of Systems:          Objective     Vital Signs  Vital Sign Min/Max for last 24 hours  Temp  Min: 98.1 °F (36.7 °C)  Max: 99.3 °F (37.4 °C)   BP  Min: 129/50  Max: 136/49   Pulse  Min: 75  Max: 107   Resp  Min: 16  Max: 24   SpO2  Min: 95 %  Max: 100 %   Flow (L/min)  Min: 2  Max: 2   Weight  Min: 59 kg (130 lb)  Max: 59 kg (130 lb)        Ventilator/Non-Invasive Ventilation Settings (From admission, onward)      None                         Body mass index is 20.36 kg/m².  No intake/output data recorded.  No intake/output data recorded.        Physical Exam:    General Appearance: Older white female on the side of the bed just came back from the bathroom and her trip for the echocardiogram she is in no distress now on 2 L O2 she is saturations are 97 to 98%  Eyes: Conjunctiva are clear and anicteric  ENT: Mucous membranes are moist no erythema or exudates, Mallampati type II airway, nasal septum midline  Neck: No adenopathy or thyromegaly no jugular venous tension trachea midline  Lungs: Decreased overall but I do not hear any wheezes rales or rhonchi no dullness symmetric expansion she is not labored on 2 L  O2  Cardiac: Regular rate rhythm murmur at the left apex  Abdomen: Soft nontender no palpable hepatosplenomegaly or masses  : Not examined  Musculoskeletal: Grossly normal  Skin: Warm and dry no jaundice no petechiae  Neuro: She is alert and oriented she is cooperative following commands and grossly intact  Extremities/P Vascular: No clubbing no cyanosis no edema palpable radial and dorsalis pedis pulses  MSE: Pleasant lady she is appropriate definitely gets a little anxious at time     Labs:  Results from last 7 days   Lab Units 04/03/24  0540 04/02/24  0526 04/01/24 0610 03/31/24 2127   GLUCOSE mg/dL 109* 117* 94 123*   SODIUM mmol/L 142 140 139 141   POTASSIUM mmol/L 3.2* 3.6 3.7 4.1   CO2 mmol/L 31.1* 28.4 30.0* 30.0*   CHLORIDE mmol/L 101 101 101 101   ANION GAP mmol/L 9.9 10.6 8.0 10.0   CREATININE mg/dL 0.79 0.81 0.68 0.76   BUN mg/dL 7* 7* 6* 9   BUN / CREAT RATIO  8.9 8.6 8.8 11.8   CALCIUM mg/dL 8.3* 8.5* 8.9 9.7   ALK PHOS U/L 69  --   --  94   TOTAL PROTEIN g/dL 5.6*  --   --  6.6   ALT (SGPT) U/L 12  --   --  20   AST (SGOT) U/L 18  --   --  19   BILIRUBIN mg/dL 0.2  --   --  0.6   ALBUMIN g/dL 2.9*  --   --  3.6   GLOBULIN gm/dL 2.7  --   --  3.0     Estimated Creatinine Clearance: 61.7 mL/min (by C-G formula based on SCr of 0.79 mg/dL).      Results from last 7 days   Lab Units 04/03/24  0540 04/02/24  0526 04/01/24 0610 03/31/24 2127   WBC 10*3/mm3 10.05 9.55 11.24* 12.77*   RBC 10*6/mm3 2.68* 2.94* 3.10* 3.43*   HEMOGLOBIN g/dL 7.3* 7.8* 8.2* 9.4*   HEMATOCRIT % 22.4* 24.8* 25.8* 29.4*   MCV fL 83.6 84.4 83.2 85.7   MCH pg 27.2 26.5* 26.5* 27.4   MCHC g/dL 32.6 31.5 31.8 32.0   RDW % 14.4 14.6 14.5 14.5   RDW-SD fl 44.1 44.7 43.4 44.9   MPV fL 10.4 10.3 10.4 10.0   PLATELETS 10*3/mm3 213 245 246 268   NEUTROPHIL % % 84.3* 82.5* 87.3* 85.8*   LYMPHOCYTE % % 8.5* 9.5* 6.8* 7.9*   MONOCYTES % % 5.4 5.5 4.3* 4.3*   EOSINOPHIL % % 0.9 1.0 0.4 0.9   BASOPHIL % % 0.3 0.3 0.2 0.2   IMM GRAN % % 0.6*  1.2* 1.0* 0.9*   NEUTROS ABS 10*3/mm3 8.48* 7.87* 9.82* 10.96*   LYMPHS ABS 10*3/mm3 0.85 0.91 0.76 1.01   MONOS ABS 10*3/mm3 0.54 0.53 0.48 0.55   EOS ABS 10*3/mm3 0.09 0.10 0.05 0.11   BASOS ABS 10*3/mm3 0.03 0.03 0.02 0.03   IMMATURE GRANS (ABS) 10*3/mm3 0.06* 0.11* 0.11* 0.11*   NRBC /100 WBC 0.0 0.0 0.0 0.0         Results from last 7 days   Lab Units 03/31/24  2127   HSTROP T ng/L 23*     Results from last 7 days   Lab Units 03/31/24  2127   PROBNP pg/mL 2,009.0*         Results from last 7 days   Lab Units 04/02/24  0526 03/31/24  2223 03/31/24  2127   LACTATE mmol/L  --  0.8  --    PROCALCITONIN ng/mL 0.10  --  0.10         Microbiology Results (last 10 days)       Procedure Component Value - Date/Time    MRSA Screen, PCR (Inpatient) - Swab, Nares [[id number]]  (Normal) Collected: 04/01/24 1047    Lab Status: Final result Specimen: Swab from Nares Updated: 04/01/24 1216     MRSA PCR No MRSA Detected    Narrative:      The negative predictive value of this diagnostic test is high and should only be used to consider de-escalating anti-MRSA therapy. A positive result may indicate colonization with MRSA and must be correlated clinically.    Blood Culture - Blood, Arm, Left [[id number]]  (Abnormal)  (Susceptibility) Collected: 03/31/24 2325    Lab Status: Final result Specimen: Blood from Arm, Left Updated: 04/03/24 0640     Blood Culture Enterococcus faecalis     Comment:   Infectious disease consultation is highly recommended.        Isolated from Aerobic and Anaerobic Bottles     Gram Stain Aerobic Bottle Gram positive cocci in pairs      Anaerobic Bottle Gram positive cocci in pairs    Susceptibility        Enterococcus faecalis      [place]      Ampicillin Susceptible      Gentamicin High Level Synergy Resistant      Streptomycin High Level Synergy Susceptible      Vancomycin Susceptible                           Blood Culture - Blood, Arm, Right [[id number]]  (Abnormal) Collected: 03/31/24 2325    Lab Status:  Final result Specimen: Blood from Arm, Right Updated: 04/03/24 0640     Blood Culture Enterococcus faecalis     Comment:   Infectious disease consultation is highly recommended.        Isolated from Aerobic and Anaerobic Bottles     Gram Stain Anaerobic Bottle Gram positive cocci in pairs      Aerobic Bottle Gram positive cocci in pairs    Narrative:      Refer to previous blood culture collected on 03/31/2024 2325 for MICs    Blood Culture ID, PCR - Blood, Arm, Left [[id number]]  (Abnormal) Collected: 03/31/24 2325    Lab Status: Final result Specimen: Blood from Arm, Left Updated: 04/01/24 1905     BCID, PCR Enterococcus faecalis. Farhad/B (vancomycin resistance gene) not detected. Identification by BCID2 PCR.     BOTTLE TYPE Anaerobic Bottle    Narrative:      Infectious disease consultation is highly recommended to rule out distant foci of infection.    Respiratory Panel PCR w/COVID-19(SARS-CoV-2) KAJAL/MARILIN/DEBBIE/PAD/COR/DAVID In-House, NP Swab in UTM/VTM, 2 HR TAT - Swab, Nasopharynx [[id number]]  (Normal) Collected: 03/31/24 2222    Lab Status: Final result Specimen: Swab from Nasopharynx Updated: 03/31/24 2321     ADENOVIRUS, PCR Not Detected     Coronavirus 229E Not Detected     Coronavirus HKU1 Not Detected     Coronavirus NL63 Not Detected     Coronavirus OC43 Not Detected     COVID19 Not Detected     Human Metapneumovirus Not Detected     Human Rhinovirus/Enterovirus Not Detected     Influenza A PCR Not Detected     Influenza B PCR Not Detected     Parainfluenza Virus 1 Not Detected     Parainfluenza Virus 2 Not Detected     Parainfluenza Virus 3 Not Detected     Parainfluenza Virus 4 Not Detected     RSV, PCR Not Detected     Bordetella pertussis pcr Not Detected     Bordetella parapertussis PCR Not Detected     Chlamydophila pneumoniae PCR Not Detected     Mycoplasma pneumo by PCR Not Detected    Narrative:      In the setting of a positive respiratory panel with a viral infection PLUS a negative procalcitonin  without other underlying concern for bacterial infection, consider observing off antibiotics or discontinuation of antibiotics and continue supportive care. If the respiratory panel is positive for atypical bacterial infection (Bordetella pertussis, Chlamydophila pneumoniae, or Mycoplasma pneumoniae), consider antibiotic de-escalation to target atypical bacterial infection.                amLODIPine, 10 mg, Oral, Daily  ampicillin, 2 g, Intravenous, Q4H  apixaban, 5 mg, Oral, Q12H  atorvastatin, 40 mg, Oral, Nightly  budesonide, 0.5 mg, Nebulization, BID - RT  carvedilol, 6.25 mg, Oral, BID With Meals  cefTRIAXone, 2,000 mg, Intravenous, Q12H  cetirizine, 10 mg, Oral, Daily  cyclobenzaprine, 10 mg, Oral, Nightly  furosemide, 40 mg, Oral, BID  ipratropium-albuterol, 3 mL, Nebulization, 4x Daily - RT  lisinopril, 40 mg, Oral, Daily  Magnesium Oxide -Mg Supplement, 1 tablet, Oral, Daily  multivitamin with minerals, 1 tablet, Oral, Daily  pantoprazole, 40 mg, Oral, Q AM  potassium chloride, 20 mEq, Oral, Daily  roflumilast, 500 mcg, Oral, Daily  rOPINIRole, 2 mg, Oral, Nightly  sertraline, 100 mg, Oral, Daily  sodium chloride, 10 mL, Intravenous, Q12H      O2, 2 L/min, Last Rate: 2 L/min (04/02/24 0659)        Diagnostics:  FL ESOPHAGRAM SINGLE CONTRAST    Result Date: 4/1/2024  ESOPHAGRAM  HISTORY: Recurrent pneumonia. Some dysphagia and reflux symptoms.  FINDINGS: An initial PA view of the chest was obtained. The lungs are well expanded with some scattered chronic interstitial changes and scarring, particularly in the upper lobes that is similar to the study of 06/09/2016. The heart is top normal in size with sternal wires from previous cardiac surgery.  The patient swallowed sips of barium without difficulty. There is normal distensibility of the hypopharynx and cervical esophagus. The remainder of the esophagus is also normal in appearance. There is no hiatus hernia. No reflux occurs during the water siphon test.   CONCLUSION: Negative esophagram. 95 images were obtained and the fluoroscopy time measures 30 seconds. The dose Kerma measures 5 mGy.  This report was finalized on 4/1/2024 4:05 PM by Dr. Isaías Garza M.D on Workstation: [id number]      XR Chest 1 View    Result Date: 3/31/2024  SINGLE VIEW OF THE CHEST  HISTORY: Shortness of breath  COMPARISON: March 23, 2024  FINDINGS: There is cardiomegaly. Patient is status post median sternotomy. There are background emphysematous changes. When compared to prior study, patient appears to have some patchy infiltrate within the right midlung. There may be some additional mild consolidation at the left lung base. No pneumothorax or large effusion is seen.      Patchy infiltrate suspected within the right midlung. There may be some additional involvement at the left lung base.  This report was finalized on 3/31/2024 10:29 PM by Dr. Leigh Parr M.D on Workstation: [id number]      XR Chest 1 View    Result Date: 3/23/2024  XR CHEST 1 VW-  HISTORY: Female who is 68 years-old, short of breath  TECHNIQUE: Frontal view of the chest  COMPARISON: 3/2/2024  FINDINGS: The heart size is borderline. Sternotomy wires are present. Pulmonary vasculature is unremarkable. Aorta is calcified. No focal pulmonary consolidation, pleural effusion, or pneumothorax. No acute osseous process.      No focal pulmonary consolidation. Borderline heart size. Follow-up as clinical indications persist.  This report was finalized on 3/23/2024 7:33 AM by Dr. Chapincito Garcia M.D on Workstation: [id number]      Adult Transthoracic Echo Complete w/ Color, Spectral and Contrast if Necessary Per Protocol    Result Date: 3/3/2024    Left ventricular systolic function is hyperdynamic (EF > 70%). Calculated left ventricular EF = 73.7%   Left ventricular wall thickness is consistent with concentric hypertrophy.   Left ventricular diastolic function was indeterminate.   There is a bioprosthetic mitral valve  present. There is a moderate, non-mobile mass on the posterior leaflet(s) of the prosthetic mitral valve that is consistent with a vegetation.   Mild aortic valve stenosis is present. Aortic valve area is 1.8 cm2.   Aortic valve maximum pressure gradient is 15 mmHg. Aortic valve mean pressure gradient is 9 mmHg.   Moderate mitral valve stenosis is present. The mitral valve peak gradient is 21 mmHg. The mitral valve mean gradient is 9 mmHg.   Mild mitral valve regurgitation is present.   Mild tricuspid valve regurgitation is present.   Estimated right ventricular systolic pressure from tricuspid regurgitation is mildly elevated (35-45 mmHg). Calculated right ventricular systolic pressure from tricuspid regurgitation is 41 mmHg.     Results for orders placed during the hospital encounter of 03/02/24    Adult Transthoracic Echo Complete w/ Color, Spectral and Contrast if Necessary Per Protocol    Interpretation Summary    Left ventricular systolic function is hyperdynamic (EF > 70%). Calculated left ventricular EF = 73.7%    Left ventricular wall thickness is consistent with concentric hypertrophy.    Left ventricular diastolic function was indeterminate.    There is a bioprosthetic mitral valve present. There is a moderate, non-mobile mass on the posterior leaflet(s) of the prosthetic mitral valve that is consistent with a vegetation.    Mild aortic valve stenosis is present. Aortic valve area is 1.8 cm2.    Aortic valve maximum pressure gradient is 15 mmHg. Aortic valve mean pressure gradient is 9 mmHg.    Moderate mitral valve stenosis is present. The mitral valve peak gradient is 21 mmHg. The mitral valve mean gradient is 9 mmHg.    Mild mitral valve regurgitation is present.    Mild tricuspid valve regurgitation is present.    Estimated right ventricular systolic pressure from tricuspid regurgitation is mildly elevated (35-45 mmHg). Calculated right ventricular systolic pressure from tricuspid regurgitation is 41  mmHg.          Active Hospital Problems    Diagnosis  POA    **Pneumonia [J18.9]  Yes    Septicemia due to enterococcus [A41.81]  Yes    Acute-on-chronic respiratory failure [J96.20]  Yes    COPD exacerbation [J44.1]  Yes    Chronic diastolic CHF (congestive heart failure) [I50.32]  Yes    Iron deficiency anemia [D50.9]  Yes    Chronic hypoxemic respiratory failure [J96.11]  Yes    S/P TVR (tricuspid valve repair) [Z98.890]  Not Applicable    Hypertension [I10]  Yes    PAF (paroxysmal atrial fibrillation) [I48.0]  Yes      Resolved Hospital Problems   No resolved problems to display.         Assessment & Plan     Pneumonia new infiltrates her chest x-ray was pretty clear a week ago sounds like she is having recurrent episodes the respiratory pathogen panel is negative blood cultures Enterococcus faecalis.   recurrent infiltrates raises the question of aspiration esophagram was normal.  She is with swallowing but speech felt she was safe to continue regular diet and thin liquids.  The Enterococcus could come from her lung but that is not the most common source.  Continue antibiotics will defer antibiotics to infectious disease  Enterococcus faecalis bacteremia worry with her prosthetic valve for possible endocarditis echocardiogram pending.  Repeat blood cultures pending  COPD do not think she is in an acute exacerbation do not think systemic steroids are indicated at this time continue bronchodilators  Chronic respiratory failure with hypoxia  Pulmonary hypertension by echocardiogram mild to moderate  Valvular heart disease prior mitral valve replacement with tissue valve and tricuspid valve repair  Paroxysmal A-fib status post Maze procedure  Hypertension  Hyperlipidemia  Hiatal hernia has had some epigastric and chest pain and some pain up and down the middle of her chest some swallowing issues I am going to ask for an esophagram as well  Anemia looks like may be a combination of iron deficiency and chronic  disease    Plan for disposition:    Ag Melo Jr, MD  04/03/24  09:39 EDT    Time:

## 2024-04-03 NOTE — CONSULTS
Date of Consultation: 24    Referral Provider: Dr. Nicole.    Reason for Consultation: Possible endocarditis, CHIO requested.    Encounter Provider: Kamar Haddad MD    Group of Service: [facility]     Patient Name: Paz Browne    :1955    Chief complaint: Shortness of breath,  Fever.    History of Present Illness:      This is a very pleasant 68 year-old female who follows with Dr. Null in the office. She has a past medical history significant for paroxysmal atrial fibrillation, tricuspid valve repair, mitral valve replacement, COPD, hypertension, pulmonary hypertension, and GI bleed.      In 2014, she was hospitalized with progressive shortness of breath and was found to be in rapid atrial fibrillation with congestive heart failure. Echocardiogram revealed normal LV systolic function with severely dilated left atrium, moderately enlarged right ventricle, severe mitral and tricuspid insufficiency, and severe pulmonary hypertension. She was diuresed and rate controlled. A CHIO confirmed severe mitral and tricuspid regurgitation. She subsequently underwent a cardiac catheterization that showed severe mitral insufficiency but no significant coronary artery disease. She underwent urgent mitral valve repair using a #26 annuloplasty ring which was unsuccessful and had to be replaced with a #31 mm porcine [employer] Leopoldo prosthesis. She also had tricuspid valve repair and a right and left CryoMaze procedure with closure of the atrial appendage. She was later found to be bradycardic and then went back into atrial fibrillation and was started on warfarin.      In 2017, she had GI bleeding. She had a polypectomy along with some gastritis and hemorrhoids. Her warfarin was stopped and she was in sinus rhythm. She was admitted in 2018 with some right-sided pneumonia and viral infection. She was again admitted in 2019 with Haemophilus influenza pneumonia and  exacerbation of her COPD.      In 2021, she was diagnosed with recurrent strep bacteremia. At that time, a CHIO was done that showed a small vegetation on the bioprosthetic mitral valve, but no valvular destruction. She was ultimately treated with IV antibiotics for 6 weeks and oral antibiotics for 3 months.     She was recently admitted on 3/4/2024 with COPD and community-acquired pneumonia.  There was concern because of an echocardiogram at that time which showed a calcified and persistent echodensity on the tissue mitral valve replacement.  She had coag negative staph at that time which was felt to be a possible contaminant.  She also had atrial fibrillation during that time, which was the first time that she had experienced this  in many years.  She had a GI bleed in 2017 while on warfarin after a colonoscopy.  We decided to try her on Eliquis at that time.    Patient presented to the emergency department with worsening shortness of breath and low-grade fever and chills.  She has been found to have Enterococcus faecalis growing from her blood cultures on 3/31/2024.  Dr. Clayton has seen the patient, and is concerned about recurrent endocarditis with her bioprosthetic mitral valve.  She is currently on ampicillin.    Past Medical History:   Diagnosis Date    VAN (acute kidney injury)     Anemia     Asthma     Atrial flutter     cardioversion    Cataract     Celiac artery stenosis     Chronic respiratory failure with hypoxia     Colon polyp     COPD (chronic obstructive pulmonary disease)     GI bleed     Hiatal hernia     History of CHF (congestive heart failure)     due to MR    History of home oxygen therapy     3 lpm NC    History of mitral valve replacement with tissue graft     Hyperlipidemia     Hypertension     Infectious viral hepatitis     B    Intertrigo     Long term (current) use of anticoagulants     Mitral regurgitation     s/p tissue MVR    PAF (paroxysmal atrial fibrillation)     s/p MAZE     Pneumonia     Pulmonary hypertension     S/P TVR (tricuspid valve repair) 7/7/2016         Past Surgical History:   Procedure Laterality Date    CARDIAC CATHETERIZATION  09/01/2014    Right dominant systemt, normal coronary arteries.     CARDIAC CATHETERIZATION Left 6/10/2016    Procedure: Cardiac catheterization;  Surgeon: Sergei Hall MD;  Location: [facility] CATH INVASIVE LOCATION;  Service:     CARDIAC CATHETERIZATION N/A 6/10/2016    Procedure: Right Heart Cath;  Surgeon: Sergei Hall MD;  Location: [facility] CATH INVASIVE LOCATION;  Service:     CATARACT EXTRACTION      COLONOSCOPY      COLONOSCOPY N/A 8/4/2017    Procedure: COLONOSCOPY TO CECUM/TI WITH POLYPECTOMY ( COLD BX);  Surgeon: Cleveland Devine MD;  Location: [facility] ENDOSCOPY;  Service:     COLONOSCOPY N/A 8/10/2017    Procedure: COLONOSCOPY to cecum and TI with 2 clips placed at transverse;  Surgeon: Earnest PALOMO MD;  Location: [facility] ENDOSCOPY;  Service:     COLONOSCOPY N/A 12/22/2017    Procedure: COLONOSCOPY INTO CECUM WITH COLD POLYPECTOMIES;  Surgeon: Cleveland Devine MD;  Location: [facility] ENDOSCOPY;  Service:     COLONOSCOPY N/A 5/17/2021    Procedure: COLONOSCOPY to cecum;  Surgeon: Cleveland Devine MD;  Location: [facility] ENDOSCOPY;  Service: Gastroenterology;  Laterality: N/A;  Pre: Fe deficency anemia, h/x of polyps  Post: fair prep, normal    ENDOSCOPY N/A 8/17/2017    Procedure: ESOPHAGOGASTRODUODENOSCOPY;  Surgeon: Porhsa Ruby MD;  Location: [facility] ENDOSCOPY;  Service:     ENDOSCOPY N/A 12/22/2017    Procedure: ESOPHAGOGASTRODUODENOSCOPY WITH BIOPSIES;  Surgeon: Cleveland Devine MD;  Location: [facility] ENDOSCOPY;  Service:     ENDOSCOPY N/A 5/17/2021    Procedure: ESOPHAGOGASTRODUODENOSCOPY  with biopsies;  Surgeon: Cleveland Devine MD;  Location: [facility] ENDOSCOPY;  Service: Gastroenterology;  Laterality: N/A;  Pre: Fe deficency anemia, nausea, heme positive stool   Post: gastritis, sloughing of distal esophagus mucosa    GALLBLADDER SURGERY       HEMORRHOIDECTOMY      HYSTERECTOMY      KIDNEY SURGERY  04/22/2013    Stent placement    MAZE PROCEDURE      MITRAL VALVE REPLACEMENT      TONSILLECTOMY      TRICUSPID VALVE REPLACEMENT           Allergies   Allergen Reactions    Bupropion Itching    Cephalexin Itching     Tolerated piperacillin/tazobactam, ampicillin, cefdinir, and ceftriaxone    Metoprolol Itching         No current facility-administered medications on file prior to encounter.     Current Outpatient Medications on File Prior to Encounter   Medication Sig Dispense Refill    albuterol sulfate  (90 Base) MCG/ACT inhaler Inhale 2 puffs Every 4 (Four) Hours As Needed for Wheezing. 18 g 3    amLODIPine (NORVASC) 10 MG tablet TAKE 1 TABLET BY MOUTH EVERY DAY 90 tablet 1    apixaban (ELIQUIS) 5 MG tablet tablet Take 1 tablet by mouth Every 12 (Twelve) Hours. Indications: Atrial Fibrillation 180 tablet 3    atorvastatin (LIPITOR) 40 MG tablet TAKE 1 TABLET BY MOUTH EVERY DAY 90 tablet 2    carvedilol (COREG) 6.25 MG tablet TAKE 1 TABLET BY MOUTH TWICE A DAY WITH MEALS 180 tablet 2    cyclobenzaprine (FLEXERIL) 10 MG tablet TAKE 1 TABLET BY MOUTH EVERYDAY AT BEDTIME 90 tablet 3    furosemide (LASIX) 40 MG tablet TAKE 1 TABLET BY MOUTH TWICE A  tablet 1    HYDROcodone-acetaminophen (NORCO) 7.5-325 MG per tablet Take 1 tablet by mouth Every 6 (Six) Hours As Needed for Moderate Pain (Chronic pain medicine for low back pain). 90 tablet 0    ipratropium-albuterol (DUO-NEB) 0.5-2.5 mg/3 ml nebulizer Take 3 mL by nebulization Every 4 (Four) Hours As Needed for Wheezing. 360 mL 3    lisinopril (PRINIVIL,ZESTRIL) 40 MG tablet Take 1 tablet by mouth Daily.      loratadine (CLARITIN) 10 MG tablet Take 1 tablet by mouth 2 (two) times a day.      Magnesium Oxide 400 (240 Mg) MG tablet TAKE 1 TABLET BY MOUTH EVERY DAY 90 tablet 1    Multiple Vitamins-Minerals (MULTIVITAL) tablet Take 1 tablet by mouth Daily.      O2 (OXYGEN) Inhale 2 L/min Continuous.       Omega-3 Fatty Acids (fish oil) 1000 MG capsule capsule Take  by mouth Every Night.      omeprazole (priLOSEC) 40 MG capsule TAKE 1 CAPSULE BY MOUTH EVERY DAY 90 capsule 1    ondansetron ODT (ZOFRAN-ODT) 4 MG disintegrating tablet Place 1 tablet on the tongue Every 8 (Eight) Hours As Needed for Nausea or Vomiting. 10 tablet 0    polyethylene glycol (MIRALAX) packet Take 17 g by mouth 2 (Two) Times a Day. (Patient taking differently: Take 17 g by mouth Every 12 (Twelve) Hours As Needed.)      potassium chloride (K-DUR,KLOR-CON) 20 MEQ CR tablet 1 po tid 60 tablet 0    roflumilast (DALIRESP) 500 MCG tablet tablet Take 1 tablet by mouth Daily. 90 tablet 1    rOPINIRole (REQUIP) 2 MG tablet TAKE 1 TABLET BY MOUTH EVERY DAY AT NIGHT 90 tablet 2    sertraline (ZOLOFT) 100 MG tablet TAKE 1 TABLET BY MOUTH EVERY DAY 90 tablet 1    zolpidem (AMBIEN) 10 MG tablet Take 1 tablet by mouth At Night As Needed for Sleep. 30 tablet 3    budesonide (PULMICORT) 0.5 MG/2ML nebulizer solution Take 2 mL by nebulization 2 (Two) Times a Day for 30 days. Indications: Chronic Obstructive Lung Disease, Chronic hypoxic respiratory failure 360 mL 3    docosanol (ABREVA) 10 % cream cream Apply 1 Application topically to the appropriate area as directed 5 (Five) Times a Day. 2 g 0    Nebulizer device 1 Units 4 (Four) Times a Day As Needed (Wheezing). J44.1 j20.8 1 each 0         Social History     Socioeconomic History    Marital status:     Number of children: 2   Tobacco Use    Smoking status: Former     Current packs/day: 0.00     Average packs/day: 3.0 packs/day for 54.0 years (162.0 ttl pk-yrs)     Types: Cigarettes     Start date:      Quit date: 2007     Years since quittin.2     Passive exposure: Past    Smokeless tobacco: Never    Tobacco comments:     caffeine - tea or mt dew    Vaping Use    Vaping status: Never Used   Substance and Sexual Activity    Alcohol use: No    Drug use: No    Sexual activity: Defer  "        Family History   Adopted: Yes   Problem Relation Age of Onset    No Known Problems Mother     No Known Problems Father        REVIEW OF SYSTEMS:   Pertinent positives are noted in the HPI above.  Otherwise, all other systems were reviewed, and are negative.     Objective:     Vitals:    04/03/24 1222 04/03/24 1311 04/03/24 1553 04/03/24 1646   BP: 137/65   127/54   BP Location: Right arm   Right arm   Patient Position: Lying   Lying   Pulse:  83 80    Resp: 22 20 20 20   Temp: 100 °F (37.8 °C)   98.2 °F (36.8 °C)   TempSrc: Oral   Oral   SpO2:  99% 93% 94%   Weight:       Height:         Body mass index is 20.36 kg/m².  Flowsheet Rows      Flowsheet Row First Filed Value   Admission Height 170.2 cm (67\") Documented at 03/31/2024 2118   Admission Weight 59 kg (130 lb) Documented at 03/31/2024 2118             General:    No acute distress, alert and oriented x4, pleasant                   Head:    Normocephalic, atraumatic.   Eyes:          Conjunctivae and sclerae normal, no icterus.   Throat:   No oral lesions, no thrush, oral mucosa moist.    Neck:   Supple, trachea midline.   Lungs:     Decreased breath sounds and rhonchi bilaterally.     Heart:    Regular rhythm with ectopy and normal rate.  II/VI SM throughout.    Abdomen:     Soft, non-tender, non-distended, positive bowel sounds.    Extremities:   No clubbing, cyanosis, or edema.     Pulses:   Pulses palpable and equal bilaterally.    Skin:   No bleeding or rash.   Neuro:   Non-focal.  Moves all extremities well.    Psychiatric:   Normal mood and affect.           Lab Review:                Results from last 7 days   Lab Units 04/03/24  0540   SODIUM mmol/L 142   POTASSIUM mmol/L 3.2*   CHLORIDE mmol/L 101   CO2 mmol/L 31.1*   BUN mg/dL 7*   CREATININE mg/dL 0.79   GLUCOSE mg/dL 109*   CALCIUM mg/dL 8.3*     Results from last 7 days   Lab Units 03/31/24  2127   HSTROP T ng/L 23*     Results from last 7 days   Lab Units 04/03/24  0540   WBC 10*3/mm3 " 10.05   HEMOGLOBIN g/dL 7.3*   HEMATOCRIT % 22.4*   PLATELETS 10*3/mm3 213                       EKG (reviewed by me personally): Sinus rhythm, frequent PACs, short NH interval.      Assessment:   1.  Enterococcus faecalis bacteremia  2.  Community-acquired pneumonia, recurrent  3.  COPD without acute exacerbation  4.  Chronic hypoxic respiratory failure, on home oxygen  5.   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  History of streptococcal bioprosthetic MVR endocarditis in 2021, treated with 6 weeks of antibiotics.  Persistent calcified vegetation remnant on MVR.  7.  Paroxysmal atrial fibrillation  8.  Celiac artery stenosis  9.  History of GI bleed in 2017 while on warfarin and shortly following after a colonoscopy  10.  Anemia of chronic disease   11.  Mild aortic stenosis  12.  Hypertension  13.  Frequent PACs    Plan:       At this point, I do agree that a CHIO is indicated.  She has a history of bioprosthetic mitral valve endocarditis in 2021 which was treated with 6 weeks of antibiotics at that time.  She has had a persistent calcified remnant of the vegetation noted on previous echocardiograms.  However, in the setting of new enterococcal bacteremia, there is obvious concern of recurrent SBE.    She does not have any obvious esophageal issues.  She has not had difficulty with swallowing recently.  There are no obvious contraindications to a CHOI.  I discussed this in detail, and she is willing to proceed.  I will have this scheduled tomorrow morning in the PACU with anesthesia support given her chronic respiratory issues.    Also, I did just start her on Eliquis at her last hospital stay.  Her hemoglobin has been drifting downward, although this could be some dilution.  I will keep a close eye on this and recheck tomorrow morning.  She has PACs, but no atrial fibrillation currently.    Thank you very much for this  consult.    Jem Haddad MD

## 2024-04-03 NOTE — THERAPY TREATMENT NOTE
Patient Name: Paz Browne  : 1955    MRN: [id number]                              Today's Date: 4/3/2024       Admit Date: 3/31/2024    Visit Dx:     ICD-10-CM ICD-9-CM   1. Pneumonia due to infectious organism, unspecified laterality, unspecified part of lung  J18.9 486     Patient Active Problem List   Diagnosis    PAF (paroxysmal atrial fibrillation)    Hypertension    Hyperlipidemia    Pain medication agreement    Hiatal hernia    Primary osteoarthritis involving multiple joints    Pulmonary hypertension    S/P TVR (tricuspid valve repair)    Pulmonary nodule    Restless leg syndrome    Chronic low back pain    Dysplastic polyp of colon    History of mitral valve replacement with tissue graft    Chronic hypoxemic respiratory failure    Anemia    Celiac artery stenosis    Intertrigo    Abnormal EKG    Muscle spasms of both lower extremities    Iron deficiency anemia    Adenomatous polyp of colon    Gastroesophageal reflux disease without esophagitis    Headache    History of endocarditis    Pneumonia of both lower lobes due to infectious organism    Peptic ulcer disease    Peripheral vascular disease    Hyperlipidemia    Hyperglycemia    COPD with acute exacerbation    Chronic diastolic CHF (congestive heart failure)    Chronic bilateral low back pain    COPD exacerbation    Leukocytosis    Elevated troponin    Pneumonia    Acute-on-chronic respiratory failure    Septicemia due to enterococcus     Past Medical History:   Diagnosis Date    VAN (acute kidney injury)     Anemia     Asthma     Atrial flutter     cardioversion    Cataract     Celiac artery stenosis     Chronic respiratory failure with hypoxia     Colon polyp     COPD (chronic obstructive pulmonary disease)     GI bleed     Hiatal hernia     History of CHF (congestive heart failure)     due to MR    History of home oxygen therapy     3 lpm NC    History of mitral valve replacement with tissue graft     Hyperlipidemia     Hypertension      Infectious viral hepatitis     B    Intertrigo     Long term (current) use of anticoagulants     Mitral regurgitation     s/p tissue MVR    PAF (paroxysmal atrial fibrillation)     s/p MAZE    Pneumonia     Pulmonary hypertension     S/P TVR (tricuspid valve repair) 7/7/2016     Past Surgical History:   Procedure Laterality Date    CARDIAC CATHETERIZATION  09/01/2014    Right dominant systemt, normal coronary arteries.     CARDIAC CATHETERIZATION Left 6/10/2016    Procedure: Cardiac catheterization;  Surgeon: Sergei Hall MD;  Location: [facility] CATH INVASIVE LOCATION;  Service:     CARDIAC CATHETERIZATION N/A 6/10/2016    Procedure: Right Heart Cath;  Surgeon: Sergei Hall MD;  Location: [facility] CATH INVASIVE LOCATION;  Service:     CATARACT EXTRACTION      COLONOSCOPY      COLONOSCOPY N/A 8/4/2017    Procedure: COLONOSCOPY TO CECUM/TI WITH POLYPECTOMY ( COLD BX);  Surgeon: Cleveland Devine MD;  Location: [facility] ENDOSCOPY;  Service:     COLONOSCOPY N/A 8/10/2017    Procedure: COLONOSCOPY to cecum and TI with 2 clips placed at transverse;  Surgeon: Earnest PALOMO MD;  Location: [facility] ENDOSCOPY;  Service:     COLONOSCOPY N/A 12/22/2017    Procedure: COLONOSCOPY INTO CECUM WITH COLD POLYPECTOMIES;  Surgeon: Cleveland Devine MD;  Location: [facility] ENDOSCOPY;  Service:     COLONOSCOPY N/A 5/17/2021    Procedure: COLONOSCOPY to cecum;  Surgeon: Cleveland Devine MD;  Location: [facility] ENDOSCOPY;  Service: Gastroenterology;  Laterality: N/A;  Pre: Fe deficency anemia, h/x of polyps  Post: fair prep, normal    ENDOSCOPY N/A 8/17/2017    Procedure: ESOPHAGOGASTRODUODENOSCOPY;  Surgeon: Porsha Ruby MD;  Location: [facility] ENDOSCOPY;  Service:     ENDOSCOPY N/A 12/22/2017    Procedure: ESOPHAGOGASTRODUODENOSCOPY WITH BIOPSIES;  Surgeon: Cleveland Devine MD;  Location: [facility] ENDOSCOPY;  Service:     ENDOSCOPY N/A 5/17/2021    Procedure: ESOPHAGOGASTRODUODENOSCOPY  with biopsies;  Surgeon: Cleveland Devine MD;  Location: [facility] ENDOSCOPY;   Service: Gastroenterology;  Laterality: N/A;  Pre: Fe deficency anemia, nausea, heme positive stool   Post: gastritis, sloughing of distal esophagus mucosa    GALLBLADDER SURGERY      HEMORRHOIDECTOMY      HYSTERECTOMY      KIDNEY SURGERY  04/22/2013    Stent placement    MAZE PROCEDURE      MITRAL VALVE REPLACEMENT      TONSILLECTOMY      TRICUSPID VALVE REPLACEMENT        General Information       Row Name 04/03/24 1249          Physical Therapy Time and Intention    Document Type therapy note (daily note)  -PH     Mode of Treatment physical therapy  -PH       Row Name 04/03/24 1249          General Information    Existing Precautions/Restrictions fall;oxygen therapy device and L/min  -PH       Row Name 04/03/24 1249          Safety Issues, Functional Mobility    Impairments Affecting Function (Mobility) endurance/activity tolerance;strength;shortness of breath;balance  -PH     Comment, Safety Issues/Impairments (Mobility) gt belt and non skid socks donned  -PH               User Key  (r) = Recorded By, (t) = Taken By, (c) = Cosigned By      Initials Name Provider Type    PH H[place]ubler, Nyla, PTA Physical Therapist Assistant                   Mobility       Row Name 04/03/24 1250          Bed Mobility    Bed Mobility supine-sit  -PH     Supine-Sit [place] (Bed Mobility) modified independence  -PH     Assistive Device (Bed Mobility) head of bed elevated;bed rails  -PH       Row Name 04/03/24 1250          Sit-Stand Transfer    Sit-Stand [place] (Transfers) standby assist  -PH     Assistive Device (Sit-Stand Transfers) other (see comments)  no AD  -PH       Row Name 04/03/24 1250          Gait/Stairs (Locomotion)    [place] Level (Gait) supervision  -PH     Assistive Device (Gait) other (see comments)  no AD  -PH     Distance in Feet (Gait) 60  -PH     Deviations/Abnormal Patterns (Gait) gait speed decreased  -PH     Bilateral Gait Deviations forward flexed posture  -PH     [place] Level  (Stairs) not tested  -PH     Comment, (Gait/Stairs) slow w/ gait speed improved today; SOA noted and reported by pt w/ sats at 88% when pt was seated in chair after gait  -PH               User Key  (r) = Recorded By, (t) = Taken By, (c) = Cosigned By      Initials Name Provider Type     Nyla Livingston PTA Physical Therapist Assistant                   Obj/Interventions       Row Name 04/03/24 1251          Motor Skills    Therapeutic Exercise other (see comments)  BAP, LAQ, seated march, punches; x 10-15 reps all  -PH       Row Name 04/03/24 1251          Balance    Balance Assessment sitting static balance;standing static balance  -PH     Static Sitting Balance independent  -PH     Static Standing Balance standby assist  -PH     Position/Device Used, Standing Balance other (see comments)  no AD  -PH     Comment, Balance no overt LOB  -PH               User Key  (r) = Recorded By, (t) = Taken By, (c) = Cosigned By      Initials Name Provider Type     Nyla Livingston PTA Physical Therapist Assistant                   Goals/Plan    No documentation.                  Clinical Impression       Row Name 04/03/24 1252          Pain    Posttreatment Pain Rating 8/10  -PH     Pain Location - neck;back  -PH     Pain Intervention(s) Repositioned;Ambulation/increased activity;Rest  towel roll placed behind neck and pillows under each UE  -PH     Additional Documentation Pain Scale: Numbers Pre/Post-Treatment (Group)  -PH       Row Name 04/03/24 1252          Plan of Care Review    Plan of Care Reviewed With patient;son  -PH     Progress improving  -PH     Outcome Evaluation Pt was seen by PT this AM for tx. Pt was in bed and sat up to EOB w/ mod I. Pt stood w/ SBA. Pt then amb approx 60' w/ SV and no AD. Pt was slow w/ no overt LOB. Pt c/o SOA w/ sats at 88% when pt was seated in chair. Pt performed ther ex for strengthening and was UIC at end of session.  -PH       Row Name 04/03/24 1252          Vital  Signs    Pre SpO2 (%) 96  -PH     O2 Delivery Pre Treatment nasal cannula  -PH     Intra SpO2 (%) 88  -PH     O2 Delivery Intra Treatment nasal cannula  -PH     Post SpO2 (%) 82  -PH     O2 Delivery Post Treatment nasal cannula  -PH       Row Name 04/03/24 1252          Positioning and Restraints    Pre-Treatment Position in bed  -PH     Post Treatment Position chair  -PH     In Chair reclined;call light within reach;encouraged to call for assist;exit alarm on;with family/caregiver;notified nsg  -PH               User Key  (r) = Recorded By, (t) = Taken By, (c) = Cosigned By      Initials Name Provider Type     Nyla Livingston PTA Physical Therapist Assistant                   Outcome Measures       Row Name 04/03/24 1300          How much help from another person do you currently need...    Turning from your back to your side while in flat bed without using bedrails? 4  -PH     Moving from lying on back to sitting on the side of a flat bed without bedrails? 4  -PH     Moving to and from a bed to a chair (including a wheelchair)? 3  -PH     Standing up from a chair using your arms (e.g., wheelchair, bedside chair)? 3  -PH     Climbing 3-5 steps with a railing? 2  -PH     To walk in hospital room? 3  -PH     AM-PAC 6 Clicks Score (PT) 19  -PH     Highest Level of Mobility Goal 6 --> Walk 10 steps or more  -PH       Row Name 04/03/24 1300          Functional Assessment    Outcome Measure Options AM-PAC 6 Clicks Basic Mobility (PT)  -PH               User Key  (r) = Recorded By, (t) = Taken By, (c) = Cosigned By      Initials Name Provider Type     Nyla Livingston PTA Physical Therapist Assistant                                 Physical Therapy Education       Title: PT OT SLP Therapies (Done)       Topic: Physical Therapy (Done)       Point: Mobility training (Done)       Learning Progress Summary             Patient Acceptance, E,TB,D, VU by  at 4/3/2024 1300    Acceptance, E,TB,D, VU by  at  4/2/2024 1128    Acceptance, E, VU by CW at 4/1/2024 0847                         Point: Home exercise program (Done)       Learning Progress Summary             Patient Acceptance, E,TB,D, VU by  at 4/3/2024 1300    Acceptance, E,TB,D, VU by PH at 4/2/2024 1128                         Point: Body mechanics (Done)       Learning Progress Summary             Patient Acceptance, E,TB,D, VU by  at 4/3/2024 1300    Acceptance, E,TB,D, VU by PH at 4/2/2024 1128                         Point: Precautions (Done)       Learning Progress Summary             Patient Acceptance, E,TB,D, VU by  at 4/3/2024 1300    Acceptance, E,TB,D, VU by  at 4/2/2024 1128                                         User Key       Initials Effective Dates Name Provider Type Discipline     06/16/21 -  Nyla Livingston PTA Physical Therapist Assistant PT     12/13/22 -  Stephanie Vazquez PT Physical Therapist PT                  PT Recommendation and Plan     Plan of Care Reviewed With: patient, son  Progress: improving  Outcome Evaluation: Pt was seen by PT this AM for tx. Pt was in bed and sat up to EOB w/ mod I. Pt stood w/ SBA. Pt then amb approx 60' w/ SV and no AD. Pt was slow w/ no overt LOB. Pt c/o SOA w/ sats at 88% when pt was seated in chair. Pt performed ther ex for strengthening and was UIC at end of session.     Time Calculation:         PT Charges       Row Name 04/03/24 1301             Time Calculation    Start Time 1025  -PH      Stop Time 1040  -PH      Time Calculation (min) 15 min  -PH      PT Received On 04/03/24  -PH      PT - Next Appointment 04/04/24  -PH         Timed Charges    32082 - PT Therapeutic Exercise Minutes 5  -PH      04439 - PT Therapeutic Activity Minutes 10  -PH         Total Minutes    Timed Charges Total Minutes 15  -PH       Total Minutes 15  -PH                User Key  (r) = Recorded By, (t) = Taken By, (c) = Cosigned By      Initials Name Provider Type     Nyla Livingston,  PTA Physical Therapist Assistant                  Therapy Charges for Today       Code Description Service Date Service Provider Modifiers Qty    92989798257 HC PT THERAPEUTIC ACT EA 15 MIN 4/2/2024 Nyla Livingston, AYUSH GP 1    43765295509 HC PT THERAPEUTIC ACT EA 15 MIN 4/3/2024 Nyla Livingston PTA GP 1            PT G-Codes  Outcome Measure Options: AM-PAC 6 Clicks Basic Mobility (PT)  AM-PAC 6 Clicks Score (PT): 19  PT Discharge Summary  Anticipated Discharge Disposition (PT): home with home health, home with assist    Nyla Livingston PTA  4/3/2024

## 2024-04-03 NOTE — PLAN OF CARE
Problem: Adult Inpatient Plan of Care  Goal: Plan of Care Review  Outcome: Ongoing, Progressing  Flowsheets (Taken 4/3/2024 0340)  Plan of Care Reviewed With: patient  Goal: Patient-Specific Goal (Individualized)  Outcome: Ongoing, Progressing  Goal: Absence of Hospital-Acquired Illness or Injury  Outcome: Ongoing, Progressing  Intervention: Identify and Manage Fall Risk  Recent Flowsheet Documentation  Taken 4/3/2024 0200 by Almita Jha RN  Safety Promotion/Fall Prevention:   activity supervised   assistive device/personal items within reach   clutter free environment maintained   safety round/check completed  Taken 4/2/2024 2356 by Almita Jha RN  Safety Promotion/Fall Prevention:   activity supervised   assistive device/personal items within reach   clutter free environment maintained   safety round/check completed  Taken 4/2/2024 2200 by Almita Jha RN  Safety Promotion/Fall Prevention:   activity supervised   assistive device/personal items within reach   clutter free environment maintained   safety round/check completed  Taken 4/2/2024 2000 by Almita Jha RN  Safety Promotion/Fall Prevention: safety round/check completed  Intervention: Prevent Skin Injury  Recent Flowsheet Documentation  Taken 4/3/2024 0200 by Almita Jha RN  Body Position: position changed independently  Taken 4/2/2024 2356 by Almita Jha RN  Body Position: position changed independently  Taken 4/2/2024 2200 by Almita Jha RN  Body Position: position changed independently  Taken 4/2/2024 2000 by Almita Jha RN  Body Position: position changed independently  Skin Protection: adhesive use limited  Intervention: Prevent and Manage VTE (Venous Thromboembolism) Risk  Recent Flowsheet Documentation  Taken 4/2/2024 2000 by Almita Jha RN  VTE Prevention/Management:   bilateral   sequential compression devices on  Range of Motion: active ROM (range of motion) encouraged  Intervention: Prevent Infection  Recent  Flowsheet Documentation  Taken 4/2/2024 2000 by Almita Jha RN  Infection Prevention: single patient room provided  Goal: Optimal Comfort and Wellbeing  Outcome: Ongoing, Progressing  Intervention: Provide Person-Centered Care  Recent Flowsheet Documentation  Taken 4/2/2024 2000 by Almita Jha RN  Trust Relationship/Rapport:   care explained   choices provided  Goal: Readiness for Transition of Care  Outcome: Ongoing, Progressing     Problem: Fall Injury Risk  Goal: Absence of Fall and Fall-Related Injury  Outcome: Ongoing, Progressing  Intervention: Identify and Manage Contributors  Recent Flowsheet Documentation  Taken 4/3/2024 0200 by Almita Jha RN  Medication Review/Management: medications reviewed  Taken 4/2/2024 2356 by Almita Jha RN  Medication Review/Management: medications reviewed  Taken 4/2/2024 2200 by Almita Jha RN  Medication Review/Management: medications reviewed  Taken 4/2/2024 2000 by Almita Jha RN  Medication Review/Management: medications reviewed  Intervention: Promote Injury-Free Environment  Recent Flowsheet Documentation  Taken 4/3/2024 0200 by Almita Jha RN  Safety Promotion/Fall Prevention:   activity supervised   assistive device/personal items within reach   clutter free environment maintained   safety round/check completed  Taken 4/2/2024 2356 by Almita Jha RN  Safety Promotion/Fall Prevention:   activity supervised   assistive device/personal items within reach   clutter free environment maintained   safety round/check completed  Taken 4/2/2024 2200 by Almita Jah RN  Safety Promotion/Fall Prevention:   activity supervised   assistive device/personal items within reach   clutter free environment maintained   safety round/check completed  Taken 4/2/2024 2000 by Almita Jha RN  Safety Promotion/Fall Prevention: safety round/check completed     Problem: Fluid Imbalance (Pneumonia)  Goal: Fluid Balance  Outcome: Ongoing, Progressing     Problem:  Infection (Pneumonia)  Goal: Resolution of Infection Signs and Symptoms  Outcome: Ongoing, Progressing     Problem: Respiratory Compromise (Pneumonia)  Goal: Effective Oxygenation and Ventilation  Outcome: Ongoing, Progressing  Intervention: Promote Airway Secretion Clearance  Recent Flowsheet Documentation  Taken 4/2/2024 2000 by Almita Jha RN  Cough And Deep Breathing: done independently per patient  Intervention: Optimize Oxygenation and Ventilation  Recent Flowsheet Documentation  Taken 4/3/2024 0200 by Almita Jha RN  Head of Bed (HOB) Positioning: HOB at 20 degrees  Taken 4/2/2024 2356 by Almita Jha RN  Head of Bed (HOB) Positioning: HOB at 20 degrees  Taken 4/2/2024 2200 by Almita Jha RN  Head of Bed (HOB) Positioning: HOB at 20 degrees  Taken 4/2/2024 2000 by Almita Jha RN  Head of Bed (HOB) Positioning: HOB at 20 degrees     Problem: Adjustment to Illness COPD (Chronic Obstructive Pulmonary Disease)  Goal: Optimal Chronic Illness Coping  Outcome: Ongoing, Progressing  Intervention: Support and Optimize Psychosocial Response  Recent Flowsheet Documentation  Taken 4/2/2024 2000 by Almita Jha RN  Supportive Measures: active listening utilized  Family/Support System Care: self-care encouraged     Problem: Functional Ability Impaired COPD (Chronic Obstructive Pulmonary Disease)  Goal: Optimal Level of Functional Weld  Outcome: Ongoing, Progressing  Intervention: Optimize Functional Ability  Recent Flowsheet Documentation  Taken 4/2/2024 2000 by Almita Jha RN  Environmental Support: calm environment promoted     Problem: Infection COPD (Chronic Obstructive Pulmonary Disease)  Goal: Absence of Infection Signs and Symptoms  Outcome: Ongoing, Progressing     Problem: Oral Intake Inadequate COPD (Chronic Obstructive Pulmonary Disease)  Goal: Improved Nutrition Intake  Outcome: Ongoing, Progressing     Problem: Respiratory Compromise COPD (Chronic Obstructive Pulmonary  Disease)  Goal: Effective Oxygenation and Ventilation  Outcome: Ongoing, Progressing  Intervention: Promote Airway Secretion Clearance  Recent Flowsheet Documentation  Taken 4/2/2024 2000 by Almita Jha RN  Cough And Deep Breathing: done independently per patient  Intervention: Optimize Oxygenation and Ventilation  Recent Flowsheet Documentation  Taken 4/3/2024 0200 by Almita Jha RN  Head of Bed (HOB) Positioning: HOB at 20 degrees  Taken 4/2/2024 2356 by Almita Jha RN  Head of Bed (HOB) Positioning: HOB at 20 degrees  Taken 4/2/2024 2200 by Almita Jha RN  Head of Bed (HOB) Positioning: HOB at 20 degrees  Taken 4/2/2024 2000 by Almita Jha RN  Head of Bed ([facility]) Positioning: HOB at 20 degrees     Problem: Infection  Goal: Absence of Infection Signs and Symptoms  Outcome: Ongoing, Progressing     Problem: Activity Intolerance (Pulmonary Impairment)  Goal: Improved Activity Tolerance  Outcome: Ongoing, Progressing     Problem: Airway Clearance Ineffective (Pulmonary Impairment)  Goal: Effective Airway Clearance  Outcome: Ongoing, Progressing     Problem: Gas Exchange Impaired (Pulmonary Impairment)  Goal: Optimal Gas Exchange  Outcome: Ongoing, Progressing     Problem: Adjustment to Illness (Sepsis/Septic Shock)  Goal: Optimal Coping  Outcome: Ongoing, Progressing  Intervention: Optimize Psychosocial Adjustment to Illness  Recent Flowsheet Documentation  Taken 4/2/2024 2000 by Almita Jha RN  Supportive Measures: active listening utilized  Family/Support System Care: self-care encouraged     Problem: Bleeding (Sepsis/Septic Shock)  Goal: Absence of Bleeding  Outcome: Ongoing, Progressing     Problem: Glycemic Control Impaired (Sepsis/Septic Shock)  Goal: Blood Glucose Level Within Desired Range  Outcome: Ongoing, Progressing     Problem: Infection Progression (Sepsis/Septic Shock)  Goal: Absence of Infection Signs and Symptoms  Outcome: Ongoing, Progressing  Intervention: Initiate Sepsis  Management  Recent Flowsheet Documentation  Taken 4/2/2024 2000 by Almita Jha RN  Infection Prevention: single patient room provided  Intervention: Promote Recovery  Recent Flowsheet Documentation  Taken 4/2/2024 2000 by Almita Jha RN  Sleep/Rest Enhancement: awakenings minimized     Problem: Nutrition Impaired (Sepsis/Septic Shock)  Goal: Optimal Nutrition Intake  Outcome: Ongoing, Progressing     Problem: COPD (Chronic Obstructive Pulmonary Disease) Comorbidity  Goal: Maintenance of COPD Symptom Control  Outcome: Ongoing, Progressing  Intervention: Maintain COPD-Symptom Control  Recent Flowsheet Documentation  Taken 4/3/2024 0200 by Almita Jha RN  Medication Review/Management: medications reviewed  Taken 4/2/2024 2356 by Almita Jha RN  Medication Review/Management: medications reviewed  Taken 4/2/2024 2200 by Almita Jha RN  Medication Review/Management: medications reviewed  Taken 4/2/2024 2000 by Almita Jha RN  Supportive Measures: active listening utilized  Medication Review/Management: medications reviewed     Problem: Heart Failure Comorbidity  Goal: Maintenance of Heart Failure Symptom Control  Outcome: Ongoing, Progressing  Intervention: Maintain Heart Failure-Management  Recent Flowsheet Documentation  Taken 4/3/2024 0200 by Almita Jha RN  Medication Review/Management: medications reviewed  Taken 4/2/2024 2356 by Almita Jha RN  Medication Review/Management: medications reviewed  Taken 4/2/2024 2200 by Almita Jha RN  Medication Review/Management: medications reviewed  Taken 4/2/2024 2000 by Almita Jha RN  Medication Review/Management: medications reviewed   Goal Outcome Evaluation:  Plan of Care Reviewed With: patient

## 2024-04-03 NOTE — PROGRESS NOTES
"DAILY PROGRESS NOTE  [facility]    Patient Identification:  Name: Paz Browne  Age: 68 y.o.  Sex: female  :  1955  MRN: [id number]         Primary Care Physician: Javan Martinez MD      Subjective  Patient notes an episode of wheezing and shortness of air this morning.  Presently feeling better and breathing easy again.    Objective:  General Appearance:  Comfortable, well-appearing, in no acute distress and not in pain.    Vital signs: (most recent): Blood pressure 110/86, pulse 96, temperature 99.7 °F (37.6 °C), temperature source Oral, resp. rate 20, height 170.2 cm (67\"), weight 59 kg (130 lb), SpO2 96%.    Lungs:  Normal effort and normal respiratory rate.  She is not in respiratory distress.  No stridor.  There are wheezes.  No rales, decreased breath sounds or rhonchi.  (And expiratory wheezes but with very reasonable air movement.)  Heart: Normal rate.  Regular rhythm.    Extremities: There is no dependent edema.    Neurological: Patient is alert and oriented to person, place and time.    Skin:  Warm and dry.                  Vital signs in last 24 hours:  Temp:  [98.1 °F (36.7 °C)-99.7 °F (37.6 °C)] 99.7 °F (37.6 °C)  Heart Rate:  [] 96  Resp:  [16-24] 20  BP: (110-136)/(49-86) 110/86    Intake/Output:  No intake or output data in the 24 hours ending 24 1100      Results from last 7 days   Lab Units 24  0540 24  0524  0610 03/31/24  2127   WBC 10*3/mm3 10.05 9.55 11.24* 12.77*   HEMOGLOBIN g/dL 7.3* 7.8* 8.2* 9.4*   PLATELETS 10*3/mm3 213 245 246 268     Results from last 7 days   Lab Units 24  0540 24  0526 24  0610 247   SODIUM mmol/L 142 140 139 141   POTASSIUM mmol/L 3.2* 3.6 3.7 4.1   CHLORIDE mmol/L 101 101 101 101   CO2 mmol/L 31.1* 28.4 30.0* 30.0*   BUN mg/dL 7* 7* 6* 9   CREATININE mg/dL 0.79 0.81 0.68 0.76   GLUCOSE mg/dL 109* 117* 94 123*   Estimated Creatinine Clearance: 61.7 mL/min (by C-G formula " based on SCr of 0.79 mg/dL).  Results from last 7 days   Lab Units 04/03/24  0540 04/02/24  0526 04/01/24  0610 03/31/24  2127   CALCIUM mg/dL 8.3* 8.5* 8.9 9.7   ALBUMIN g/dL 2.9*  --   --  3.6     Results from last 7 days   Lab Units 04/03/24  0540 03/31/24  2127   ALBUMIN g/dL 2.9* 3.6   BILIRUBIN mg/dL 0.2 0.6   ALK PHOS U/L 69 94   AST (SGOT) U/L 18 19   ALT (SGPT) U/L 12 20     Echocardiogram noted.    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  Repeat blood cultures negative.  Infectious disease input greatly appreciated.    Possible SBE: See discussion above.  Pulmonary input greatly appreciated.  Pulmonary infiltrate/pneumonia: Pulmonary input greatly appreciated.  Should be covered by the above antibiotics.  Evaluation for possible aspiration.  COPD exacerbation: Resolved.    Chronic hypoxic respiratory failure: Appears to be at baseline at present.  Paroxysmal atrial fibrillation: Rate reasonably controlled.  Continue rate control and anticoagulants.  Hypertension: Continue home meds.  Monitor.  Anemia of chronic disease/iron deficiency anemia: Continue iron supplements.  Monitor.  Hypokalemia: Oral replacement.  Monitor.  Severe pulmonary hypertension:  MR/MS:    Plan:  Please see above.  Cardiology consult.  Discussed with patient and son at bedside.    Gil Nicole MD  4/3/2024  11:00 EDT

## 2024-04-03 NOTE — PLAN OF CARE
Problem: Fall Injury Risk  Goal: Absence of Fall and Fall-Related Injury  Outcome: Ongoing, Progressing     Problem: Infection (Pneumonia)  Goal: Resolution of Infection Signs and Symptoms  Outcome: Ongoing, Progressing     Problem: Respiratory Compromise (Pneumonia)  Goal: Effective Oxygenation and Ventilation  Outcome: Ongoing, Progressing   Goal Outcome Evaluation:   Assist x 1 to BR. IV antibiotics still being given. No complaints at this time. Patient in bed with call light within reach. Bed alarm on.

## 2024-04-04 ENCOUNTER — APPOINTMENT (OUTPATIENT)
Dept: POSTOP/PACU | Facility: HOSPITAL | Age: 69
DRG: 314 | End: 2024-04-04
Payer: MEDICARE

## 2024-04-04 ENCOUNTER — ANESTHESIA EVENT (OUTPATIENT)
Dept: POSTOP/PACU | Facility: HOSPITAL | Age: 69
End: 2024-04-04
Payer: MEDICARE

## 2024-04-04 ENCOUNTER — ANESTHESIA (OUTPATIENT)
Dept: POSTOP/PACU | Facility: HOSPITAL | Age: 69
End: 2024-04-04
Payer: MEDICARE

## 2024-04-04 VITALS — SYSTOLIC BLOOD PRESSURE: 92 MMHG | OXYGEN SATURATION: 100 % | DIASTOLIC BLOOD PRESSURE: 31 MMHG | HEART RATE: 82 BPM

## 2024-04-04 LAB
ANION GAP SERPL CALCULATED.3IONS-SCNC: 10.8 MMOL/L (ref 5–15)
BH CV ECHO MEAS - MV DEC SLOPE: 699.7 CM/SEC2
BH CV ECHO MEAS - MV MAX PG: 29.9 MMHG
BH CV ECHO MEAS - MV MEAN PG: 11.1 MMHG
BH CV ECHO MEAS - MV P1/2T: 113.8 MSEC
BH CV ECHO MEAS - MV V2 VTI: 69.4 CM
BH CV ECHO MEAS - MVA(P1/2T): 1.93 CM2
BH CV ECHO MEAS - RAP SYSTOLE: 8 MMHG
BH CV ECHO MEAS - RVSP: 76 MMHG
BH CV ECHO MEAS - TR MAX PG: 68 MMHG
BH CV ECHO MEAS - TR MAX VEL: 411 CM/SEC
BUN SERPL-MCNC: 7 MG/DL (ref 8–23)
BUN/CREAT SERPL: 9.2 (ref 7–25)
CALCIUM SPEC-SCNC: 8.3 MG/DL (ref 8.6–10.5)
CHLORIDE SERPL-SCNC: 99 MMOL/L (ref 98–107)
CO2 SERPL-SCNC: 31.2 MMOL/L (ref 22–29)
CREAT SERPL-MCNC: 0.76 MG/DL (ref 0.57–1)
DEPRECATED RDW RBC AUTO: 43.3 FL (ref 37–54)
EGFRCR SERPLBLD CKD-EPI 2021: 84.4 ML/MIN/1.73
ERYTHROCYTE [DISTWIDTH] IN BLOOD BY AUTOMATED COUNT: 14.4 % (ref 12.3–15.4)
GLUCOSE BLDC GLUCOMTR-MCNC: 126 MG/DL (ref 70–130)
GLUCOSE SERPL-MCNC: 111 MG/DL (ref 65–99)
HCT VFR BLD AUTO: 22.3 % (ref 34–46.6)
HGB BLD-MCNC: 7.3 G/DL (ref 12–15.9)
MCH RBC QN AUTO: 27.2 PG (ref 26.6–33)
MCHC RBC AUTO-ENTMCNC: 32.7 G/DL (ref 31.5–35.7)
MCV RBC AUTO: 83.2 FL (ref 79–97)
PLATELET # BLD AUTO: 198 10*3/MM3 (ref 140–450)
PMV BLD AUTO: 10.5 FL (ref 6–12)
POTASSIUM SERPL-SCNC: 3.4 MMOL/L (ref 3.5–5.2)
RBC # BLD AUTO: 2.68 10*6/MM3 (ref 3.77–5.28)
SODIUM SERPL-SCNC: 141 MMOL/L (ref 136–145)
WBC NRBC COR # BLD AUTO: 10.72 10*3/MM3 (ref 3.4–10.8)

## 2024-04-04 PROCEDURE — 25810000003 LACTATED RINGERS PER 1000 ML: Performed by: NURSE ANESTHETIST, CERTIFIED REGISTERED

## 2024-04-04 PROCEDURE — 94761 N-INVAS EAR/PLS OXIMETRY MLT: CPT

## 2024-04-04 PROCEDURE — 93312 ECHO TRANSESOPHAGEAL: CPT

## 2024-04-04 PROCEDURE — 25010000002 PROPOFOL 10 MG/ML EMULSION: Performed by: NURSE ANESTHETIST, CERTIFIED REGISTERED

## 2024-04-04 PROCEDURE — 94799 UNLISTED PULMONARY SVC/PX: CPT

## 2024-04-04 PROCEDURE — 85027 COMPLETE CBC AUTOMATED: CPT | Performed by: INTERNAL MEDICINE

## 2024-04-04 PROCEDURE — 93325 DOPPLER ECHO COLOR FLOW MAPG: CPT | Performed by: INTERNAL MEDICINE

## 2024-04-04 PROCEDURE — 25010000002 LIDOCAINE 1 % SOLUTION: Performed by: NURSE ANESTHETIST, CERTIFIED REGISTERED

## 2024-04-04 PROCEDURE — 99232 SBSQ HOSP IP/OBS MODERATE 35: CPT | Performed by: INTERNAL MEDICINE

## 2024-04-04 PROCEDURE — 82948 REAGENT STRIP/BLOOD GLUCOSE: CPT

## 2024-04-04 PROCEDURE — 25010000002 ONDANSETRON PER 1 MG: Performed by: NURSE PRACTITIONER

## 2024-04-04 PROCEDURE — 93320 DOPPLER ECHO COMPLETE: CPT | Performed by: INTERNAL MEDICINE

## 2024-04-04 PROCEDURE — 25010000002 CEFTRIAXONE PER 250 MG: Performed by: NURSE PRACTITIONER

## 2024-04-04 PROCEDURE — 80048 BASIC METABOLIC PNL TOTAL CA: CPT | Performed by: INTERNAL MEDICINE

## 2024-04-04 PROCEDURE — 25010000002 AMPICILLIN PER 500 MG: Performed by: INTERNAL MEDICINE

## 2024-04-04 PROCEDURE — 93320 DOPPLER ECHO COMPLETE: CPT

## 2024-04-04 PROCEDURE — 25010000002 PHENYLEPHRINE 10 MG/ML SOLUTION: Performed by: NURSE ANESTHETIST, CERTIFIED REGISTERED

## 2024-04-04 PROCEDURE — 93312 ECHO TRANSESOPHAGEAL: CPT | Performed by: INTERNAL MEDICINE

## 2024-04-04 PROCEDURE — 93325 DOPPLER ECHO COLOR FLOW MAPG: CPT

## 2024-04-04 PROCEDURE — 94664 DEMO&/EVAL PT USE INHALER: CPT

## 2024-04-04 PROCEDURE — B246ZZ4 ULTRASONOGRAPHY OF RIGHT AND LEFT HEART, TRANSESOPHAGEAL: ICD-10-PCS | Performed by: HOSPITALIST

## 2024-04-04 RX ORDER — ONDANSETRON 2 MG/ML
4 INJECTION INTRAMUSCULAR; INTRAVENOUS ONCE AS NEEDED
Status: DISCONTINUED | OUTPATIENT
Start: 2024-04-04 | End: 2024-04-04 | Stop reason: HOSPADM

## 2024-04-04 RX ORDER — DROPERIDOL 2.5 MG/ML
0.62 INJECTION, SOLUTION INTRAMUSCULAR; INTRAVENOUS
Status: DISCONTINUED | OUTPATIENT
Start: 2024-04-04 | End: 2024-04-04 | Stop reason: HOSPADM

## 2024-04-04 RX ORDER — PROMETHAZINE HYDROCHLORIDE 25 MG/1
25 SUPPOSITORY RECTAL ONCE AS NEEDED
Status: DISCONTINUED | OUTPATIENT
Start: 2024-04-04 | End: 2024-04-04 | Stop reason: HOSPADM

## 2024-04-04 RX ORDER — PHENYLEPHRINE HYDROCHLORIDE 10 MG/ML
INJECTION INTRAVENOUS AS NEEDED
Status: DISCONTINUED | OUTPATIENT
Start: 2024-04-04 | End: 2024-04-04 | Stop reason: SURG

## 2024-04-04 RX ORDER — FLUMAZENIL 0.1 MG/ML
0.2 INJECTION INTRAVENOUS AS NEEDED
Status: DISCONTINUED | OUTPATIENT
Start: 2024-04-04 | End: 2024-04-04 | Stop reason: HOSPADM

## 2024-04-04 RX ORDER — LIDOCAINE HYDROCHLORIDE 10 MG/ML
INJECTION, SOLUTION INFILTRATION; PERINEURAL AS NEEDED
Status: DISCONTINUED | OUTPATIENT
Start: 2024-04-04 | End: 2024-04-04 | Stop reason: SURG

## 2024-04-04 RX ORDER — SODIUM CHLORIDE, SODIUM LACTATE, POTASSIUM CHLORIDE, CALCIUM CHLORIDE 600; 310; 30; 20 MG/100ML; MG/100ML; MG/100ML; MG/100ML
INJECTION, SOLUTION INTRAVENOUS CONTINUOUS PRN
Status: DISCONTINUED | OUTPATIENT
Start: 2024-04-04 | End: 2024-04-04 | Stop reason: SURG

## 2024-04-04 RX ORDER — IPRATROPIUM BROMIDE AND ALBUTEROL SULFATE 2.5; .5 MG/3ML; MG/3ML
3 SOLUTION RESPIRATORY (INHALATION) ONCE
Status: COMPLETED | OUTPATIENT
Start: 2024-04-04 | End: 2024-04-04

## 2024-04-04 RX ORDER — NALOXONE HCL 0.4 MG/ML
0.2 VIAL (ML) INJECTION AS NEEDED
Status: DISCONTINUED | OUTPATIENT
Start: 2024-04-04 | End: 2024-04-04 | Stop reason: HOSPADM

## 2024-04-04 RX ORDER — POTASSIUM CHLORIDE 750 MG/1
40 TABLET, FILM COATED, EXTENDED RELEASE ORAL ONCE
Status: COMPLETED | OUTPATIENT
Start: 2024-04-04 | End: 2024-04-04

## 2024-04-04 RX ORDER — DIPHENHYDRAMINE HYDROCHLORIDE 50 MG/ML
12.5 INJECTION INTRAMUSCULAR; INTRAVENOUS
Status: DISCONTINUED | OUTPATIENT
Start: 2024-04-04 | End: 2024-04-04 | Stop reason: HOSPADM

## 2024-04-04 RX ORDER — PROPOFOL 10 MG/ML
VIAL (ML) INTRAVENOUS AS NEEDED
Status: DISCONTINUED | OUTPATIENT
Start: 2024-04-04 | End: 2024-04-04 | Stop reason: SURG

## 2024-04-04 RX ORDER — PROMETHAZINE HYDROCHLORIDE 25 MG/1
25 TABLET ORAL ONCE AS NEEDED
Status: DISCONTINUED | OUTPATIENT
Start: 2024-04-04 | End: 2024-04-04 | Stop reason: HOSPADM

## 2024-04-04 RX ADMIN — IPRATROPIUM BROMIDE AND ALBUTEROL SULFATE 3 ML: .5; 3 SOLUTION RESPIRATORY (INHALATION) at 11:42

## 2024-04-04 RX ADMIN — POTASSIUM CHLORIDE 40 MEQ: 750 TABLET, EXTENDED RELEASE ORAL at 12:48

## 2024-04-04 RX ADMIN — BUDESONIDE 0.5 MG: 0.5 INHALANT RESPIRATORY (INHALATION) at 20:07

## 2024-04-04 RX ADMIN — CYCLOBENZAPRINE 10 MG: 10 TABLET, FILM COATED ORAL at 20:25

## 2024-04-04 RX ADMIN — AMPICILLIN SODIUM 2 G: 2 INJECTION, POWDER, FOR SOLUTION INTRAVENOUS at 20:23

## 2024-04-04 RX ADMIN — AMPICILLIN SODIUM 2 G: 2 INJECTION, POWDER, FOR SOLUTION INTRAVENOUS at 09:42

## 2024-04-04 RX ADMIN — APIXABAN 5 MG: 5 TABLET, FILM COATED ORAL at 09:40

## 2024-04-04 RX ADMIN — AMPICILLIN SODIUM 2 G: 2 INJECTION, POWDER, FOR SOLUTION INTRAVENOUS at 00:00

## 2024-04-04 RX ADMIN — CARVEDILOL 6.25 MG: 6.25 TABLET, FILM COATED ORAL at 09:41

## 2024-04-04 RX ADMIN — APIXABAN 5 MG: 5 TABLET, FILM COATED ORAL at 20:25

## 2024-04-04 RX ADMIN — CEFTRIAXONE 2000 MG: 2 INJECTION, POWDER, FOR SOLUTION INTRAMUSCULAR; INTRAVENOUS at 21:37

## 2024-04-04 RX ADMIN — SODIUM CHLORIDE, POTASSIUM CHLORIDE, SODIUM LACTATE AND CALCIUM CHLORIDE: 600; 310; 30; 20 INJECTION, SOLUTION INTRAVENOUS at 07:49

## 2024-04-04 RX ADMIN — HYDROCODONE BITARTRATE AND ACETAMINOPHEN 1 TABLET: 7.5; 325 TABLET ORAL at 21:37

## 2024-04-04 RX ADMIN — LIDOCAINE HYDROCHLORIDE 60 ML: 10 INJECTION, SOLUTION INFILTRATION; PERINEURAL at 08:08

## 2024-04-04 RX ADMIN — Medication 10 ML: at 20:26

## 2024-04-04 RX ADMIN — ROFLUMILAST 500 MCG: 500 TABLET ORAL at 09:41

## 2024-04-04 RX ADMIN — PROPOFOL 50 MG: 10 INJECTION, EMULSION INTRAVENOUS at 08:15

## 2024-04-04 RX ADMIN — FUROSEMIDE 40 MG: 40 TABLET ORAL at 09:42

## 2024-04-04 RX ADMIN — CARVEDILOL 6.25 MG: 6.25 TABLET, FILM COATED ORAL at 17:01

## 2024-04-04 RX ADMIN — ONDANSETRON HYDROCHLORIDE 4 MG: 2 INJECTION, SOLUTION INTRAMUSCULAR; INTRAVENOUS at 09:59

## 2024-04-04 RX ADMIN — MAGNESIUM OXIDE 400 MG (241.3 MG MAGNESIUM) TABLET 400 MG: TABLET at 09:41

## 2024-04-04 RX ADMIN — CETIRIZINE HYDROCHLORIDE 10 MG: 10 TABLET ORAL at 09:41

## 2024-04-04 RX ADMIN — IPRATROPIUM BROMIDE AND ALBUTEROL SULFATE 3 ML: .5; 3 SOLUTION RESPIRATORY (INHALATION) at 14:42

## 2024-04-04 RX ADMIN — AMPICILLIN SODIUM 2 G: 2 INJECTION, POWDER, FOR SOLUTION INTRAVENOUS at 12:48

## 2024-04-04 RX ADMIN — SERTRALINE 100 MG: 100 TABLET, FILM COATED ORAL at 09:41

## 2024-04-04 RX ADMIN — POTASSIUM CHLORIDE 20 MEQ: 750 TABLET, EXTENDED RELEASE ORAL at 09:40

## 2024-04-04 RX ADMIN — FUROSEMIDE 40 MG: 40 TABLET ORAL at 17:01

## 2024-04-04 RX ADMIN — PROPOFOL 50 MG: 10 INJECTION, EMULSION INTRAVENOUS at 08:10

## 2024-04-04 RX ADMIN — AMLODIPINE BESYLATE 10 MG: 10 TABLET ORAL at 09:42

## 2024-04-04 RX ADMIN — PROPOFOL 50 MG: 10 INJECTION, EMULSION INTRAVENOUS at 08:08

## 2024-04-04 RX ADMIN — IPRATROPIUM BROMIDE AND ALBUTEROL SULFATE 3 ML: .5; 3 SOLUTION RESPIRATORY (INHALATION) at 20:07

## 2024-04-04 RX ADMIN — HYDROCODONE BITARTRATE AND ACETAMINOPHEN 1 TABLET: 7.5; 325 TABLET ORAL at 01:58

## 2024-04-04 RX ADMIN — PHENYLEPHRINE HYDROCHLORIDE 100 MCG: 10 INJECTION INTRAVENOUS at 08:12

## 2024-04-04 RX ADMIN — CEFTRIAXONE 2000 MG: 2 INJECTION, POWDER, FOR SOLUTION INTRAMUSCULAR; INTRAVENOUS at 10:00

## 2024-04-04 RX ADMIN — IPRATROPIUM BROMIDE AND ALBUTEROL SULFATE 3 ML: .5; 3 SOLUTION RESPIRATORY (INHALATION) at 07:30

## 2024-04-04 RX ADMIN — IPRATROPIUM BROMIDE AND ALBUTEROL SULFATE 3 ML: .5; 3 SOLUTION RESPIRATORY (INHALATION) at 02:57

## 2024-04-04 RX ADMIN — AMPICILLIN SODIUM 2 G: 2 INJECTION, POWDER, FOR SOLUTION INTRAVENOUS at 16:55

## 2024-04-04 RX ADMIN — PROPOFOL 20 MG: 10 INJECTION, EMULSION INTRAVENOUS at 08:20

## 2024-04-04 RX ADMIN — ROPINIROLE 2 MG: 2 TABLET, FILM COATED ORAL at 20:25

## 2024-04-04 RX ADMIN — AMPICILLIN SODIUM 2 G: 2 INJECTION, POWDER, FOR SOLUTION INTRAVENOUS at 04:01

## 2024-04-04 RX ADMIN — Medication 1 TABLET: at 09:40

## 2024-04-04 RX ADMIN — Medication 10 ML: at 09:44

## 2024-04-04 RX ADMIN — LISINOPRIL 40 MG: 40 TABLET ORAL at 09:41

## 2024-04-04 RX ADMIN — HYDROCODONE BITARTRATE AND ACETAMINOPHEN 1 TABLET: 7.5; 325 TABLET ORAL at 12:59

## 2024-04-04 RX ADMIN — PROPOFOL 20 MG: 10 INJECTION, EMULSION INTRAVENOUS at 08:25

## 2024-04-04 RX ADMIN — ATORVASTATIN CALCIUM 40 MG: 20 TABLET, FILM COATED ORAL at 20:25

## 2024-04-04 NOTE — ANESTHESIA POSTPROCEDURE EVALUATION
"Patient: Paz Browne    Procedure Summary       Date: 04/04/24 Room / Location: [facility] PACU    Anesthesia Start: 0748 Anesthesia Stop: 0833    Procedure: ADULT TRANSESOPHAGEAL ECHO (CHIO) W/ CONT IF NECESSARY PER PROTOCOL Diagnosis: (Endocarditis)    Scheduled Providers: Kamar Haddad MD Provider: Efraín Emanuel MD    Anesthesia Type: MAC ASA Status: 4            Anesthesia Type: MAC    Vitals  Vitals Value Taken Time   /52 04/04/24 0840   Temp     Pulse 89 04/04/24 0842   Resp 20 04/04/24 0840   SpO2 97 % 04/04/24 0842   Vitals shown include unfiled device data.        Post Anesthesia Care and Evaluation    Patient location during evaluation: PACU  Patient participation: complete - patient participated  Level of consciousness: awake  Pain management: adequate    Airway patency: patent  Anesthetic complications: No anesthetic complications    Cardiovascular status: acceptable  Respiratory status: acceptable  Hydration status: acceptable    Comments: /52   Pulse 88   Temp 37 °C (98.6 °F) (Oral)   Resp 20   Ht 170.2 cm (67\")   Wt 59 kg (130 lb)   SpO2 96%   BMI 20.36 kg/m²     "

## 2024-04-04 NOTE — PROGRESS NOTES
LOS: 2 days   Patient Care Team:  Javan Martinez MD as PCP - General (Internal Medicine)  Ag Melo Jr., MD as Consulting Physician (Pulmonary Disease)  Cleveland Devine MD as Consulting Physician (Gastroenterology)  Earnest Gan MD as Consulting Physician (Cardiology)  Daniela Shin MD PhD as Consulting Physician (Hematology and Oncology)    Subjective     Following for COPD with acute exacerbation and chronic respiratory failure.    Patient had her CHIO this morning says it was terrible.  She is on 3 L O2 and she is complaining that it is too much oxygen her oxygen saturations are in the upper 90s had dropped her to her home 2 L.  She has had CO2 retention in the past and she gets dyspneic sometimes she really panics if her oxygen's not right at 2 L.    Review of Systems:          Objective     Vital Signs  Vital Sign Min/Max for last 24 hours  Temp  Min: 98.2 °F (36.8 °C)  Max: 100 °F (37.8 °C)   BP  Min: 92/31  Max: 160/70   Pulse  Min: 72  Max: 111   Resp  Min: 16  Max: 24   SpO2  Min: 89 %  Max: 100 %   Flow (L/min)  Min: 2  Max: 10   No data recorded        Ventilator/Non-Invasive Ventilation Settings (From admission, onward)      None                         Body mass index is 20.36 kg/m².  No intake/output data recorded.  I/O this shift:  In: 250 [I.V.:250]  Out: -         Physical Exam:    General Appearance: Older white female standard bed in no apparent distress on 3 L O2 with oxygen saturations of 98%  Eyes: Conjunctiva are clear and anicteric  ENT: Mucous membranes are moist no erythema or exudates, Mallampati type II airway, nasal septum midline  Neck: No adenopathy or thyromegaly no jugular venous distension trachea midline  Lungs: Decreased overall but I do not hear any wheezes rales or rhonchi no dullness symmetric expansion she is not labored on 2 L O2  Cardiac: Regular rate rhythm did not hear a murmur today  Abdomen: Soft nontender no palpable hepatosplenomegaly or  masses  : Not examined  Musculoskeletal: Grossly normal  Skin: Warm and dry no jaundice no petechiae  Neuro: She is alert and oriented she is cooperative following commands and grossly intact  Extremities/P Vascular: No clubbing no cyanosis no edema palpable radial and dorsalis pedis pulses  MSE: Seems to be in pretty good spirits today     Labs:  Results from last 7 days   Lab Units 04/04/24 0430 04/03/24  0540 04/02/24 0526 04/01/24 0610 03/31/24  2127   GLUCOSE mg/dL 111* 109* 117* 94 123*   SODIUM mmol/L 141 142 140 139 141   POTASSIUM mmol/L 3.4* 3.2* 3.6 3.7 4.1   CO2 mmol/L 31.2* 31.1* 28.4 30.0* 30.0*   CHLORIDE mmol/L 99 101 101 101 101   ANION GAP mmol/L 10.8 9.9 10.6 8.0 10.0   CREATININE mg/dL 0.76 0.79 0.81 0.68 0.76   BUN mg/dL 7* 7* 7* 6* 9   BUN / CREAT RATIO  9.2 8.9 8.6 8.8 11.8   CALCIUM mg/dL 8.3* 8.3* 8.5* 8.9 9.7   ALK PHOS U/L  --  69  --   --  94   TOTAL PROTEIN g/dL  --  5.6*  --   --  6.6   ALT (SGPT) U/L  --  12  --   --  20   AST (SGOT) U/L  --  18  --   --  19   BILIRUBIN mg/dL  --  0.2  --   --  0.6   ALBUMIN g/dL  --  2.9*  --   --  3.6   GLOBULIN gm/dL  --  2.7  --   --  3.0     Estimated Creatinine Clearance: 64.2 mL/min (by C-G formula based on SCr of 0.76 mg/dL).      Results from last 7 days   Lab Units 04/04/24 0430 04/03/24 0540 04/02/24 0526 04/01/24 0610 03/31/24 2127   WBC 10*3/mm3 10.72 10.05 9.55 11.24* 12.77*   RBC 10*6/mm3 2.68* 2.68* 2.94* 3.10* 3.43*   HEMOGLOBIN g/dL 7.3* 7.3* 7.8* 8.2* 9.4*   HEMATOCRIT % 22.3* 22.4* 24.8* 25.8* 29.4*   MCV fL 83.2 83.6 84.4 83.2 85.7   MCH pg 27.2 27.2 26.5* 26.5* 27.4   MCHC g/dL 32.7 32.6 31.5 31.8 32.0   RDW % 14.4 14.4 14.6 14.5 14.5   RDW-SD fl 43.3 44.1 44.7 43.4 44.9   MPV fL 10.5 10.4 10.3 10.4 10.0   PLATELETS 10*3/mm3 198 213 245 246 268   NEUTROPHIL % %  --  84.3* 82.5* 87.3* 85.8*   LYMPHOCYTE % %  --  8.5* 9.5* 6.8* 7.9*   MONOCYTES % %  --  5.4 5.5 4.3* 4.3*   EOSINOPHIL % %  --  0.9 1.0 0.4 0.9   BASOPHIL % %   --  0.3 0.3 0.2 0.2   IMM GRAN % %  --  0.6* 1.2* 1.0* 0.9*   NEUTROS ABS 10*3/mm3  --  8.48* 7.87* 9.82* 10.96*   LYMPHS ABS 10*3/mm3  --  0.85 0.91 0.76 1.01   MONOS ABS 10*3/mm3  --  0.54 0.53 0.48 0.55   EOS ABS 10*3/mm3  --  0.09 0.10 0.05 0.11   BASOS ABS 10*3/mm3  --  0.03 0.03 0.02 0.03   IMMATURE GRANS (ABS) 10*3/mm3  --  0.06* 0.11* 0.11* 0.11*   NRBC /100 WBC  --  0.0 0.0 0.0 0.0         Results from last 7 days   Lab Units 03/31/24  2127   HSTROP T ng/L 23*     Results from last 7 days   Lab Units 03/31/24  2127   PROBNP pg/mL 2,009.0*         Results from last 7 days   Lab Units 04/02/24  0526 03/31/24  2223 03/31/24 2127   LACTATE mmol/L  --  0.8  --    PROCALCITONIN ng/mL 0.10  --  0.10         Microbiology Results (last 10 days)       Procedure Component Value - Date/Time    Blood Culture - Blood, Hand, Left [[id number]]  (Normal) Collected: 04/02/24 0930    Lab Status: Preliminary result Specimen: Blood from Hand, Left Updated: 04/04/24 0945     Blood Culture No growth at 2 days    Blood Culture - Blood, Arm, Left [[id number]]  (Normal) Collected: 04/02/24 0914    Lab Status: Preliminary result Specimen: Blood from Arm, Left Updated: 04/04/24 0945     Blood Culture No growth at 2 days    MRSA Screen, PCR (Inpatient) - Swab, Nares [[id number]]  (Normal) Collected: 04/01/24 1047    Lab Status: Final result Specimen: Swab from Nares Updated: 04/01/24 1216     MRSA PCR No MRSA Detected    Narrative:      The negative predictive value of this diagnostic test is high and should only be used to consider de-escalating anti-MRSA therapy. A positive result may indicate colonization with MRSA and must be correlated clinically.    Blood Culture - Blood, Arm, Left [[id number]]  (Abnormal)  (Susceptibility) Collected: 03/31/24 7021    Lab Status: Final result Specimen: Blood from Arm, Left Updated: 04/03/24 0640     Blood Culture Enterococcus faecalis     Comment:   Infectious disease consultation is highly  recommended.        Isolated from Aerobic and Anaerobic Bottles     Gram Stain Aerobic Bottle Gram positive cocci in pairs      Anaerobic Bottle Gram positive cocci in pairs    Susceptibility        Enterococcus faecalis      [place]      Ampicillin Susceptible      Gentamicin High Level Synergy Resistant      Streptomycin High Level Synergy Susceptible      Vancomycin Susceptible                           Blood Culture - Blood, Arm, Right [[id number]]  (Abnormal) Collected: 03/31/24 2325    Lab Status: Final result Specimen: Blood from Arm, Right Updated: 04/03/24 0640     Blood Culture Enterococcus faecalis     Comment:   Infectious disease consultation is highly recommended.        Isolated from Aerobic and Anaerobic Bottles     Gram Stain Anaerobic Bottle Gram positive cocci in pairs      Aerobic Bottle Gram positive cocci in pairs    Narrative:      Refer to previous blood culture collected on 03/31/2024 2325 for MICs    Blood Culture ID, PCR - Blood, Arm, Left [[id number]]  (Abnormal) Collected: 03/31/24 2325    Lab Status: Final result Specimen: Blood from Arm, Left Updated: 04/01/24 1905     BCID, PCR Enterococcus faecalis. Farhad/B (vancomycin resistance gene) not detected. Identification by BCID2 PCR.     BOTTLE TYPE Anaerobic Bottle    Narrative:      Infectious disease consultation is highly recommended to rule out distant foci of infection.    Respiratory Panel PCR w/COVID-19(SARS-CoV-2) KAJAL/MARILIN/DEBBIE/PAD/COR/DAVID In-House, NP Swab in UTM/VTM, 2 HR TAT - Swab, Nasopharynx [[id number]]  (Normal) Collected: 03/31/24 2222    Lab Status: Final result Specimen: Swab from Nasopharynx Updated: 03/31/24 2321     ADENOVIRUS, PCR Not Detected     Coronavirus 229E Not Detected     Coronavirus HKU1 Not Detected     Coronavirus NL63 Not Detected     Coronavirus OC43 Not Detected     COVID19 Not Detected     Human Metapneumovirus Not Detected     Human Rhinovirus/Enterovirus Not Detected     Influenza A PCR Not Detected      Influenza B PCR Not Detected     Parainfluenza Virus 1 Not Detected     Parainfluenza Virus 2 Not Detected     Parainfluenza Virus 3 Not Detected     Parainfluenza Virus 4 Not Detected     RSV, PCR Not Detected     Bordetella pertussis pcr Not Detected     Bordetella parapertussis PCR Not Detected     Chlamydophila pneumoniae PCR Not Detected     Mycoplasma pneumo by PCR Not Detected    Narrative:      In the setting of a positive respiratory panel with a viral infection PLUS a negative procalcitonin without other underlying concern for bacterial infection, consider observing off antibiotics or discontinuation of antibiotics and continue supportive care. If the respiratory panel is positive for atypical bacterial infection (Bordetella pertussis, Chlamydophila pneumoniae, or Mycoplasma pneumoniae), consider antibiotic de-escalation to target atypical bacterial infection.                amLODIPine, 10 mg, Oral, Daily  ampicillin, 2 g, Intravenous, Q4H  apixaban, 5 mg, Oral, Q12H  atorvastatin, 40 mg, Oral, Nightly  budesonide, 0.5 mg, Nebulization, BID - RT  carvedilol, 6.25 mg, Oral, BID With Meals  cefTRIAXone, 2,000 mg, Intravenous, Q12H  cetirizine, 10 mg, Oral, Daily  cyclobenzaprine, 10 mg, Oral, Nightly  furosemide, 40 mg, Oral, BID  ipratropium-albuterol, 3 mL, Nebulization, 4x Daily - RT  lisinopril, 40 mg, Oral, Daily  Magnesium Oxide -Mg Supplement, 1 tablet, Oral, Daily  multivitamin with minerals, 1 tablet, Oral, Daily  pantoprazole, 40 mg, Oral, Q AM  potassium chloride, 20 mEq, Oral, Daily  potassium chloride, 40 mEq, Oral, Once  roflumilast, 500 mcg, Oral, Daily  rOPINIRole, 2 mg, Oral, Nightly  sertraline, 100 mg, Oral, Daily  sodium chloride, 10 mL, Intravenous, Q12H      O2, 2 L/min, Last Rate: 2 L/min (04/02/24 0659)        Diagnostics:  FL ESOPHAGRAM SINGLE CONTRAST    Result Date: 4/1/2024  ESOPHAGRAM  HISTORY: Recurrent pneumonia. Some dysphagia and reflux symptoms.  FINDINGS: An initial PA view  of the chest was obtained. The lungs are well expanded with some scattered chronic interstitial changes and scarring, particularly in the upper lobes that is similar to the study of 06/09/2016. The heart is top normal in size with sternal wires from previous cardiac surgery.  The patient swallowed sips of barium without difficulty. There is normal distensibility of the hypopharynx and cervical esophagus. The remainder of the esophagus is also normal in appearance. There is no hiatus hernia. No reflux occurs during the water siphon test.  CONCLUSION: Negative esophagram. 95 images were obtained and the fluoroscopy time measures 30 seconds. The dose Kerma measures 5 mGy.  This report was finalized on 4/1/2024 4:05 PM by Dr. Isaías Garza M.D on Workstation: [id number]      XR Chest 1 View    Result Date: 3/31/2024  SINGLE VIEW OF THE CHEST  HISTORY: Shortness of breath  COMPARISON: March 23, 2024  FINDINGS: There is cardiomegaly. Patient is status post median sternotomy. There are background emphysematous changes. When compared to prior study, patient appears to have some patchy infiltrate within the right midlung. There may be some additional mild consolidation at the left lung base. No pneumothorax or large effusion is seen.      Patchy infiltrate suspected within the right midlung. There may be some additional involvement at the left lung base.  This report was finalized on 3/31/2024 10:29 PM by Dr. Leigh Parr M.D on Workstation: [id number]      XR Chest 1 View    Result Date: 3/23/2024  XR CHEST 1 VW-  HISTORY: Female who is 68 years-old, short of breath  TECHNIQUE: Frontal view of the chest  COMPARISON: 3/2/2024  FINDINGS: The heart size is borderline. Sternotomy wires are present. Pulmonary vasculature is unremarkable. Aorta is calcified. No focal pulmonary consolidation, pleural effusion, or pneumothorax. No acute osseous process.      No focal pulmonary consolidation. Borderline heart size. Follow-up  as clinical indications persist.  This report was finalized on 3/23/2024 7:33 AM by Dr. Chapincito Garcia M.D on Workstation: [email or web address]      Adult Transthoracic Echo Complete w/ Color, Spectral and Contrast if Necessary Per Protocol    Result Date: 3/3/2024    Left ventricular systolic function is hyperdynamic (EF > 70%). Calculated left ventricular EF = 73.7%   Left ventricular wall thickness is consistent with concentric hypertrophy.   Left ventricular diastolic function was indeterminate.   There is a bioprosthetic mitral valve present. There is a moderate, non-mobile mass on the posterior leaflet(s) of the prosthetic mitral valve that is consistent with a vegetation.   Mild aortic valve stenosis is present. Aortic valve area is 1.8 cm2.   Aortic valve maximum pressure gradient is 15 mmHg. Aortic valve mean pressure gradient is 9 mmHg.   Moderate mitral valve stenosis is present. The mitral valve peak gradient is 21 mmHg. The mitral valve mean gradient is 9 mmHg.   Mild mitral valve regurgitation is present.   Mild tricuspid valve regurgitation is present.   Estimated right ventricular systolic pressure from tricuspid regurgitation is mildly elevated (35-45 mmHg). Calculated right ventricular systolic pressure from tricuspid regurgitation is 41 mmHg.     Results for orders placed during the hospital encounter of 03/31/24    Adult Transthoracic Echo Complete W/ Cont if Necessary Per Protocol    Interpretation Summary    Left ventricular systolic function is normal. Calculated left ventricular EF = 64%    Left ventricular diastolic function is consistent with (grade II w/high LAP) pseudonormalization.    There is a bioprosthetic mitral valve present. The prosthetic mitral valve peak and mean gradients are elevated. Moderate transvalvular regurgitation is present in the prosthetic mitral valve. There is a small, echodense, mobile mass on the posterior leaflet of the prosthetic mitral valve.  No significant changes  of mass compared to prior echo.  There is stenosis or obstruction of the prosthetic mitral valve.The mitral valve mean gradient is 15 mmHg.    There is evidence of previous repair of the tricuspid valve with a ring.  Mild to moderate tricuspid valve regurgitation is present. Calculated right ventricular systolic pressure from tricuspid regurgitation is 65 mmHg. Severe pulmonary hypertension is present. Moderate tricuspid valve stenosis is present with transvalvular mean gradient of about 15 mmHg.          Active Hospital Problems    Diagnosis  POA    **Pneumonia [J18.9]  Yes    Septicemia due to enterococcus [A41.81]  Yes    Acute-on-chronic respiratory failure [J96.20]  Yes    COPD exacerbation [J44.1]  Yes    Chronic diastolic CHF (congestive heart failure) [I50.32]  Yes    Iron deficiency anemia [D50.9]  Yes    Chronic hypoxemic respiratory failure [J96.11]  Yes    S/P TVR (tricuspid valve repair) [Z98.890]  Not Applicable    Hypertension [I10]  Yes    PAF (paroxysmal atrial fibrillation) [I48.0]  Yes      Resolved Hospital Problems   No resolved problems to display.         Assessment & Plan     Pneumonia new infiltrates her chest x-ray was pretty clear a week ago sounds like she is having recurrent episodes the respiratory pathogen panel is negative blood cultures Enterococcus faecalis.   recurrent infiltrates raises the question of aspiration esophagram was normal.  She swallowing study and speech felt she was safe to continue regular diet and thin liquids.  The Enterococcus could come from her lung but that is not the most common source.  Continue antibiotics will defer antibiotics to infectious disease  Enterococcus faecalis bacteremia worry with her prosthetic valve for possible endocarditis .trans esophageal echocardiogram results pending repeat blood cultures no growth to day 2.  Looks like ID is planning a 6-week course of antibiotics I think that is probably a milan and safe plan.  COPD do not think she  is in an acute exacerbation do not think systemic steroids are indicated at this time continue bronchodilators  Chronic respiratory failure with hypoxia  Pulmonary hypertension by echocardiogram mild to moderate  Valvular heart disease prior mitral valve replacement with tissue valve and tricuspid valve repair  Paroxysmal A-fib status post Maze procedure  Hypertension  Hyperlipidemia  Hiatal hernia   Anemia looks like may be a combination of iron deficiency and chronic disease    Plan for disposition:    Ag Melo Jr, MD  04/04/24  11:58 EDT    Time:

## 2024-04-04 NOTE — ANESTHESIA PREPROCEDURE EVALUATION
Anesthesia Evaluation     Patient summary reviewed and Nursing notes reviewed   NPO Solid Status: > 8 hours  NPO Liquid Status: > 2 hours           Airway   Mallampati: II  TM distance: >3 FB  Neck ROM: full  No difficulty expected  Dental    (+) edentulous    Pulmonary    (+) a smoker Former, COPD severe, asthma,decreased breath sounds    ROS comment: On 3L home O2  Cardiovascular     ECG reviewed  Rhythm: irregular  Rate: normal    (+) hypertension 2 medications or greater, valvular problems/murmurs MR, dysrhythmias Paroxysmal Atrial Fib, CHF , PVD, hyperlipidemia    ROS comment: Hx MVR/TV repair/Maze in 2014/PAF/EF 64%, bioprosthetic MV, mod MR, mild to mod TR by ECHO yesterday  PE comment: Afib/normal VR    Neuro/Psych  (+) headaches  GI/Hepatic/Renal/Endo    (+) hiatal hernia, GERD, PUD, GI bleeding , hepatitis, liver disease, renal disease-    Musculoskeletal     (+) back pain, chronic pain  Abdominal  - normal exam   Substance History      OB/GYN          Other   arthritis, blood dyscrasia anemia,       Other Comment: Hgb 7.3                Anesthesia Plan    ASA 4     MAC     intravenous induction     Anesthetic plan, risks, benefits, and alternatives have been provided, discussed and informed consent has been obtained with: patient.      CODE STATUS:    Code Status (Patient has no pulse and is not breathing): CPR (Attempt to Resuscitate)  Medical Interventions (Patient has pulse or is breathing): Full Support

## 2024-04-04 NOTE — PLAN OF CARE
Problem: Adult Inpatient Plan of Care  Goal: Plan of Care Review  Outcome: Ongoing, Progressing  Flowsheets (Taken 4/4/2024 0257)  Plan of Care Reviewed With: patient  Goal: Patient-Specific Goal (Individualized)  Outcome: Ongoing, Progressing  Goal: Absence of Hospital-Acquired Illness or Injury  Outcome: Ongoing, Progressing  Intervention: Identify and Manage Fall Risk  Recent Flowsheet Documentation  Taken 4/4/2024 0150 by Almita Jha RN  Safety Promotion/Fall Prevention:   activity supervised   assistive device/personal items within reach   clutter free environment maintained   safety round/check completed  Taken 4/4/2024 0000 by Almita Jha RN  Safety Promotion/Fall Prevention:   activity supervised   assistive device/personal items within reach   clutter free environment maintained   safety round/check completed  Taken 4/3/2024 2159 by Almita Jha RN  Safety Promotion/Fall Prevention:   activity supervised   assistive device/personal items within reach   clutter free environment maintained   safety round/check completed  Taken 4/3/2024 2000 by Almita Jha RN  Safety Promotion/Fall Prevention: safety round/check completed  Intervention: Prevent Skin Injury  Recent Flowsheet Documentation  Taken 4/4/2024 0150 by Almita Jha RN  Body Position: position changed independently  Taken 4/4/2024 0000 by Almita Jha RN  Body Position: position changed independently  Taken 4/3/2024 2159 by Almita Jha RN  Body Position: position changed independently  Taken 4/3/2024 2000 by Almita Jha RN  Body Position: position changed independently  Skin Protection: adhesive use limited  Intervention: Prevent and Manage VTE (Venous Thromboembolism) Risk  Recent Flowsheet Documentation  Taken 4/3/2024 2000 by Almita Jha RN  VTE Prevention/Management:   bilateral   sequential compression devices on  Range of Motion: active ROM (range of motion) encouraged  Intervention: Prevent Infection  Recent  Flowsheet Documentation  Taken 4/3/2024 2000 by Almita Jha RN  Infection Prevention: single patient room provided  Goal: Optimal Comfort and Wellbeing  Outcome: Ongoing, Progressing  Intervention: Provide Person-Centered Care  Recent Flowsheet Documentation  Taken 4/3/2024 2000 by Almita Jha RN  Trust Relationship/Rapport:   choices provided   care explained  Goal: Readiness for Transition of Care  Outcome: Ongoing, Progressing     Problem: Fall Injury Risk  Goal: Absence of Fall and Fall-Related Injury  Outcome: Ongoing, Progressing  Intervention: Identify and Manage Contributors  Recent Flowsheet Documentation  Taken 4/4/2024 0150 by Almita Jha RN  Medication Review/Management: medications reviewed  Taken 4/4/2024 0000 by Almita Jha RN  Medication Review/Management: medications reviewed  Taken 4/3/2024 2159 by Almita Jha RN  Medication Review/Management: medications reviewed  Taken 4/3/2024 2000 by Almita Jha RN  Medication Review/Management: medications reviewed  Intervention: Promote Injury-Free Environment  Recent Flowsheet Documentation  Taken 4/4/2024 0150 by Almita Jha RN  Safety Promotion/Fall Prevention:   activity supervised   assistive device/personal items within reach   clutter free environment maintained   safety round/check completed  Taken 4/4/2024 0000 by Almita Jha RN  Safety Promotion/Fall Prevention:   activity supervised   assistive device/personal items within reach   clutter free environment maintained   safety round/check completed  Taken 4/3/2024 2159 by Almita Jha RN  Safety Promotion/Fall Prevention:   activity supervised   assistive device/personal items within reach   clutter free environment maintained   safety round/check completed  Taken 4/3/2024 2000 by Almita Jha RN  Safety Promotion/Fall Prevention: safety round/check completed     Problem: Fluid Imbalance (Pneumonia)  Goal: Fluid Balance  Outcome: Ongoing, Progressing     Problem:  Infection (Pneumonia)  Goal: Resolution of Infection Signs and Symptoms  Outcome: Ongoing, Progressing     Problem: Respiratory Compromise (Pneumonia)  Goal: Effective Oxygenation and Ventilation  Outcome: Ongoing, Progressing  Intervention: Promote Airway Secretion Clearance  Recent Flowsheet Documentation  Taken 4/3/2024 2000 by Almita Jha RN  Cough And Deep Breathing: done independently per patient  Intervention: Optimize Oxygenation and Ventilation  Recent Flowsheet Documentation  Taken 4/4/2024 0150 by Almita Jha RN  Head of Bed (HOB) Positioning: HOB at 20 degrees  Taken 4/4/2024 0000 by Almita Jha RN  Head of Bed (HOB) Positioning: HOB at 20 degrees  Taken 4/3/2024 2159 by Almita Jha RN  Head of Bed (HOB) Positioning: HOB at 20 degrees  Taken 4/3/2024 2000 by Almita Jha RN  Head of Bed (HOB) Positioning: HOB at 20 degrees     Problem: Adjustment to Illness COPD (Chronic Obstructive Pulmonary Disease)  Goal: Optimal Chronic Illness Coping  Outcome: Ongoing, Progressing  Intervention: Support and Optimize Psychosocial Response  Recent Flowsheet Documentation  Taken 4/3/2024 2000 by Almita Jha RN  Supportive Measures: active listening utilized  Family/Support System Care: self-care encouraged     Problem: Functional Ability Impaired COPD (Chronic Obstructive Pulmonary Disease)  Goal: Optimal Level of Functional [place]  Outcome: Ongoing, Progressing  Intervention: Optimize Functional Ability  Recent Flowsheet Documentation  Taken 4/3/2024 2000 by Almita Jha RN  Environmental Support: calm environment promoted     Problem: Infection COPD (Chronic Obstructive Pulmonary Disease)  Goal: Absence of Infection Signs and Symptoms  Outcome: Ongoing, Progressing     Problem: Oral Intake Inadequate COPD (Chronic Obstructive Pulmonary Disease)  Goal: Improved Nutrition Intake  Outcome: Ongoing, Progressing     Problem: Respiratory Compromise COPD (Chronic Obstructive Pulmonary  Disease)  Goal: Effective Oxygenation and Ventilation  Outcome: Ongoing, Progressing  Intervention: Promote Airway Secretion Clearance  Recent Flowsheet Documentation  Taken 4/3/2024 2000 by Almita Jha RN  Cough And Deep Breathing: done independently per patient  Intervention: Optimize Oxygenation and Ventilation  Recent Flowsheet Documentation  Taken 4/4/2024 0150 by Almita Jha RN  Head of Bed (HOB) Positioning: HOB at 20 degrees  Taken 4/4/2024 0000 by Almita Jha RN  Head of Bed (HOB) Positioning: HOB at 20 degrees  Taken 4/3/2024 2159 by Almita Jha RN  Head of Bed (HOB) Positioning: HOB at 20 degrees  Taken 4/3/2024 2000 by Almita Jha RN  Head of Bed ([facility]) Positioning: HOB at 20 degrees     Problem: Infection  Goal: Absence of Infection Signs and Symptoms  Outcome: Ongoing, Progressing     Problem: Activity Intolerance (Pulmonary Impairment)  Goal: Improved Activity Tolerance  Outcome: Ongoing, Progressing     Problem: Airway Clearance Ineffective (Pulmonary Impairment)  Goal: Effective Airway Clearance  Outcome: Ongoing, Progressing     Problem: Gas Exchange Impaired (Pulmonary Impairment)  Goal: Optimal Gas Exchange  Outcome: Ongoing, Progressing     Problem: Adjustment to Illness (Sepsis/Septic Shock)  Goal: Optimal Coping  Outcome: Ongoing, Progressing  Intervention: Optimize Psychosocial Adjustment to Illness  Recent Flowsheet Documentation  Taken 4/3/2024 2000 by Almita Jha RN  Supportive Measures: active listening utilized  Family/Support System Care: self-care encouraged     Problem: Bleeding (Sepsis/Septic Shock)  Goal: Absence of Bleeding  Outcome: Ongoing, Progressing     Problem: Glycemic Control Impaired (Sepsis/Septic Shock)  Goal: Blood Glucose Level Within Desired Range  Outcome: Ongoing, Progressing     Problem: Infection Progression (Sepsis/Septic Shock)  Goal: Absence of Infection Signs and Symptoms  Outcome: Ongoing, Progressing  Intervention: Initiate Sepsis  Management  Recent Flowsheet Documentation  Taken 4/3/2024 2000 by Almita Jha RN  Infection Prevention: single patient room provided  Intervention: Promote Recovery  Recent Flowsheet Documentation  Taken 4/3/2024 2000 by Almita Jha RN  Sleep/Rest Enhancement: awakenings minimized     Problem: Nutrition Impaired (Sepsis/Septic Shock)  Goal: Optimal Nutrition Intake  Outcome: Ongoing, Progressing     Problem: COPD (Chronic Obstructive Pulmonary Disease) Comorbidity  Goal: Maintenance of COPD Symptom Control  Outcome: Ongoing, Progressing  Intervention: Maintain COPD-Symptom Control  Recent Flowsheet Documentation  Taken 4/4/2024 0150 by Almita Jha RN  Medication Review/Management: medications reviewed  Taken 4/4/2024 0000 by Almita Jha RN  Medication Review/Management: medications reviewed  Taken 4/3/2024 2159 by Almita Jha RN  Medication Review/Management: medications reviewed  Taken 4/3/2024 2000 by Almita Jha RN  Supportive Measures: active listening utilized  Medication Review/Management: medications reviewed     Problem: Heart Failure Comorbidity  Goal: Maintenance of Heart Failure Symptom Control  Outcome: Ongoing, Progressing  Intervention: Maintain Heart Failure-Management  Recent Flowsheet Documentation  Taken 4/4/2024 0150 by Almita Jha RN  Medication Review/Management: medications reviewed  Taken 4/4/2024 0000 by Almita Jha RN  Medication Review/Management: medications reviewed  Taken 4/3/2024 2159 by Almita Jha RN  Medication Review/Management: medications reviewed  Taken 4/3/2024 2000 by Almita Jha RN  Medication Review/Management: medications reviewed   Goal Outcome Evaluation:  Plan of Care Reviewed With: patient

## 2024-04-04 NOTE — NURSING NOTE
Hanna COSME paged. Patient is more short of air tonight than the past few nights. O2 is taking longer to recover. Lung sounds are tight with some slight wheezing in the bases.   Hanna COSME notified of above, new orders noted.

## 2024-04-04 NOTE — PROGRESS NOTES
ID progress note    Chief complaint: Follow-up Enterococcus bacteremia with prosthetic mitral valve in place    Subjective: No fever.  Patient getting CHIO this morning.  WBC is normal.  Tolerating ampicillin and ceftriaxone without rash.    Medications:    Current Facility-Administered Medications:     acetaminophen (TYLENOL) tablet 650 mg, 650 mg, Oral, Q4H PRN **OR** acetaminophen (TYLENOL) 160 MG/5ML oral solution 650 mg, 650 mg, Oral, Q4H PRN **OR** acetaminophen (TYLENOL) suppository 650 mg, 650 mg, Rectal, Q4H PRN, Rashmi Moran APRN    albuterol (PROVENTIL) nebulizer solution 0.083% 2.5 mg/3mL, 2.5 mg, Nebulization, Q6H PRN, Hanna Narvaez APRN, 2.5 mg at 04/03/24 2314    amLODIPine (NORVASC) tablet 10 mg, 10 mg, Oral, Daily, Gil Nicole MD, 10 mg at 04/03/24 0929    ampicillin 2000 mg IVPB in 100 mL NS (MBP), 2 g, Intravenous, Q4H, Loy Clayton MD, Last Rate: 200 mL/hr at 04/04/24 0401, 2 g at 04/04/24 0401    apixaban (ELIQUIS) tablet 5 mg, 5 mg, Oral, Q12H, Gil Nicole MD, 5 mg at 04/03/24 2011    atorvastatin (LIPITOR) tablet 40 mg, 40 mg, Oral, Nightly, Gil Nicole MD, 40 mg at 04/03/24 2012    sennosides-docusate (PERICOLACE) 8.6-50 MG per tablet 2 tablet, 2 tablet, Oral, BID PRN **AND** [DISCONTINUED] polyethylene glycol (MIRALAX) packet 17 g, 17 g, Oral, Daily PRN **AND** bisacodyl (DULCOLAX) EC tablet 5 mg, 5 mg, Oral, Daily PRN **AND** bisacodyl (DULCOLAX) suppository 10 mg, 10 mg, Rectal, Daily PRN, Rashmi Moran APRN    budesonide (PULMICORT) nebulizer solution 0.5 mg, 0.5 mg, Nebulization, BID - RT, Gil Nicole MD, 0.5 mg at 04/03/24 1959    carvedilol (COREG) tablet 6.25 mg, 6.25 mg, Oral, BID With Meals, Gil Nicole MD, 6.25 mg at 04/03/24 1718    cefTRIAXone (ROCEPHIN) 2,000 mg in sodium chloride 0.9 % 100 mL MBP, 2,000 mg, Intravenous, Q12H, Rashmi Moran, APRN, Last Rate: 200 mL/hr at 04/03/24 2051,  2,000 mg at 04/03/24 2051    cetirizine (zyrTEC) tablet 10 mg, 10 mg, Oral, Daily, Gil Nicole MD, 10 mg at 04/03/24 0928    cyclobenzaprine (FLEXERIL) tablet 10 mg, 10 mg, Oral, Nightly, Gil Nicole MD, 10 mg at 04/03/24 2011    diphenhydrAMINE (BENADRYL) injection 12.5 mg, 12.5 mg, Intravenous, Q15 Min PRN, Nuzhat Roberts, CRNA    droperidol (INAPSINE) injection 0.625 mg, 0.625 mg, Intravenous, Q20 Min PRN **OR** droperidol (INAPSINE) injection 0.625 mg, 0.625 mg, Intramuscular, Q20 Min PRN, Nuzhat Roberts, CRNA    flumazenil (ROMAZICON) injection 0.2 mg, 0.2 mg, Intravenous, PRN, Nuzhat Roberts, CRNA    furosemide (LASIX) tablet 40 mg, 40 mg, Oral, BID, Gil Nicole MD, 40 mg at 04/03/24 1719    HYDROcodone-acetaminophen (NORCO) 7.5-325 MG per tablet 1 tablet, 1 tablet, Oral, Q6H PRN, Nick Tovar MD, 1 tablet at 04/04/24 0158    ipratropium-albuterol (DUO-NEB) nebulizer solution 3 mL, 3 mL, Nebulization, 4x Daily - RT, Rashmi Moran, APRN, 3 mL at 04/04/24 0730    lisinopril (PRINIVIL,ZESTRIL) tablet 40 mg, 40 mg, Oral, Daily, Gil Nicole MD, 40 mg at 04/03/24 0928    Magnesium Oxide -Mg Supplement tablet 400 mg, 1 tablet, Oral, Daily, Gil Nicole MD, 400 mg at 04/03/24 0928    multivitamin with minerals 1 tablet, 1 tablet, Oral, Daily, Gil Nicole MD, 1 tablet at 04/03/24 0928    naloxone (NARCAN) injection 0.2 mg, 0.2 mg, Intravenous, PRN, Nuzhat Roberts CRNA    nitroglycerin (NITROSTAT) SL tablet 0.4 mg, 0.4 mg, Sublingual, Q5 Min PRN, Rashmi Moran APRN    O2 (OXYGEN), 2 L/min, Inhalation, Continuous, Gil Nicole MD, Last Rate: 120,000 mL/hr at 04/02/24 0659, 2 L/min at 04/02/24 0659    ondansetron (ZOFRAN) injection 4 mg, 4 mg, Intravenous, Q6H PRN, Rashmi Moran, APRN, 4 mg at 04/03/24 2212    ondansetron (ZOFRAN) injection 4 mg, 4 mg, Intravenous, Once PRN, Sammy Andrew,  TONE [place]    pantoprazole (PROTONIX) EC tablet 40 mg, 40 mg, Oral, Q AM, Gil Nicole MD, 40 mg at 04/03/24 0541    polyethylene glycol (MIRALAX) packet 17 g, 17 g, Oral, BID PRN, Gil Nicole MD    potassium chloride (K-DUR,KLOR-CON) ER tablet 20 mEq, 20 mEq, Oral, Daily, Gil Nicole MD, 20 mEq at 04/03/24 0928    promethazine (PHENERGAN) suppository 25 mg, 25 mg, Rectal, Once PRN **OR** promethazine (PHENERGAN) tablet 25 mg, 25 mg, Oral, Once PRN, Nuzhat Roberts CRNA    roflumilast (DALIRESP) tablet 500 mcg, 500 mcg, Oral, Daily, Gil Nicole MD, 500 mcg at 04/03/24 0928    rOPINIRole (REQUIP) tablet 2 mg, 2 mg, Oral, Nightly, Gil Nicole MD, 2 mg at 04/03/24 2012    sertraline (ZOLOFT) tablet 100 mg, 100 mg, Oral, Daily, Gil Nicole MD, 100 mg at 04/03/24 0929    sodium chloride 0.9 % flush 10 mL, 10 mL, Intravenous, PRN, Gil Nicole MD    sodium chloride 0.9 % flush 10 mL, 10 mL, Intravenous, Q12H, Rashmi Moran APRN, 10 mL at 04/03/24 2012    sodium chloride 0.9 % flush 10 mL, 10 mL, Intravenous, PRN, Rashmi Moran, APRN    sodium chloride 0.9 % infusion 40 mL, 40 mL, Intravenous, PRN, Rashmi Moran, APRN    zolpidem (AMBIEN) tablet 10 mg, 10 mg, Oral, Nightly PRN, Gil Nicole MD, 10 mg at 04/03/24 2011    Facility-Administered Medications Ordered in Other Encounters:     lactated ringers infusion, , Intravenous, Continuous PRN, Nuzhat Roberts, CRNA, New Bag at 04/04/24 0749    lidocaine (XYLOCAINE) 1 % injection, , Intravenous, PRN, Sammy Andrew, Nuzhat, CRNA, 60 mL at 04/04/24 0808    phenylephrine (JOSE-SYNEPHRINE) injection, , Intravenous, PRN, Nuzhat Roberts, CRNA, 100 mcg at 04/04/24 0812    Propofol (DIPRIVAN) injection, , Intravenous, PRN, Nuzhat Roberts, CRNA, 50 mg at 04/04/24 0810      Objective   Vital Signs   Temp:  [98.2 °F (36.8 °C)-100 °F (37.8 °C)] 98.6 °F  (37 °C)  Heart Rate:  [] 106  Resp:  [16-24] 18  BP: ()/() 135/78    Physical Exam:   General: Patient seen in PACU getting CHIO  Cardiovascular: Tachycardic  Respiratory: normal work of breathing  GI: Abdomen is not distended  :  no Vargas catheter  Vasc: PIV in upper extremity w/o erythema    Labs:   CBC, CMP, and blood cultures reviewed today  Lab Results   Component Value Date    WBC 10.72 04/04/2024    HGB 7.3 (L) 04/04/2024    HCT 22.3 (L) 04/04/2024    MCV 83.2 04/04/2024     04/04/2024     Lab Results   Component Value Date    GLUCOSE 111 (H) 04/04/2024    CALCIUM 8.3 (L) 04/04/2024     04/04/2024    K 3.4 (L) 04/04/2024    CO2 31.2 (H) 04/04/2024    CL 99 04/04/2024    BUN 7 (L) 04/04/2024    CREATININE 0.76 04/04/2024    EGFRRESULT 50.6 (L) 04/11/2023    EGFR 84.4 04/04/2024    BCR 9.2 04/04/2024    ANIONGAP 10.8 04/04/2024       Procalcitonin 0.1  Lactate 0.8   BNP 2009      Microbiology:  3/31 RPP: Negative  3/31 BCx: Enterococcus faecalis (not VRE) in 2/2 sets (susceptible to ampicillin)  4/1 MRSA nares PCR: Negative  4/2 blood cultures: Negative to date    Radiology:  4/3 TTE discussed with cardiologist: Known small, echodense, mobile mass on the posterior leaflet of the prosthetic mitral valve.  No significant changes of mass compared to prior echo last month    4/4 CHIO pending      ASSESSMENT/PLAN:  Enterococcus septicemia  Mitral valve replaced  History of tricuspid valve repair  Penicillin allergy -tolerating ampicillin  COPD due to history of tobacco abuse  Pulmonary hypertension  Paroxysmal atrial fibrillation  Hiatal hernia  Anemia    TTE showed a stable, known, small, echodense, mobile mass on the posterior leaflet of the prosthetic mitral valve.  Patient is getting a CHIO today.    With prosthetic mitral valve in place and Enterococcus bacteremia, I think the benefits outweigh the risks in terms of treating her with a 6 weeks course of antibiotics.  Final plan TBD  based on CHIO results.    Continue ampicillin 2 g IV every 4 hours and ceftriaxone 2 g IV every 12 hours.  Stop date 5/13/2024.  Weekly CBC with differential and BMP faxed to me at [phone].  Follow-up with me will be arranged on 5/13/2024 at 1:30 PM.    I will order a PICC line tomorrow morning if her 4/2 blood cultures remain negative at that time.    Check CBC and BMP in the a.m. for close monitoring while on broad-spectrum antibiotics.    ID will follow.  Discussed with her nurse.

## 2024-04-04 NOTE — SIGNIFICANT NOTE
"   04/04/24 3114   OTHER   Discipline physical therapy assistant   Rehab Time/Intention   Session Not Performed patient/family declined treatment  (Pt adamantly refused PT this PM. Son who was in room explained that pt had a poor night of sleep and was scheduled to be \"scoped\" this PM. PT will follow up tomorrow.)   Recommendation   PT - Next Appointment 04/05/24       "

## 2024-04-04 NOTE — PLAN OF CARE
Problem: Adult Inpatient Plan of Care  Goal: Plan of Care Review  Outcome: Ongoing, Progressing  Flowsheets (Taken 4/4/2024 1801)  Plan of Care Reviewed With:   patient   family   Goal Outcome Evaluation:  Plan of Care Reviewed With: patient, family

## 2024-04-04 NOTE — PROGRESS NOTES
LOS: 2 days   Patient Care Team:  Javan Martinez MD as PCP - General (Internal Medicine)  Ag Melo Jr., MD as Consulting Physician (Pulmonary Disease)  Cleveland Devine MD as Consulting Physician (Gastroenterology)  Earnest Gan MD as Consulting Physician (Cardiology)  Daniela Shin MD PhD as Consulting Physician (Hematology and Oncology)    Chief Complaint: Follow-up Enterococcus faecalis bacteremia, tissue mitral valve replacement, prior endocarditis, paroxysmal atrial fibrillation.    Interval History: CHIO performed this morning which showed the known calcified remnant on the bioprosthetic mitral valve replacement.  However, there were no new or acute appearing vegetations.  She still has baseline shortness of breath.  No chest pain.    Vital Signs:  Temp:  [98.1 °F (36.7 °C)-99.1 °F (37.3 °C)] 98.1 °F (36.7 °C)  Heart Rate:  [] 91  Resp:  [16-24] 20  BP: ()/() 147/87    Intake/Output Summary (Last 24 hours) at 4/4/2024 1755  Last data filed at 4/4/2024 0831  Gross per 24 hour   Intake 250 ml   Output --   Net 250 ml       Physical Exam:   General Appearance:    No acute distress, alert and oriented x4   Lungs:     Decreased breath sounds and rhonchi bilaterally.     Heart:    Regular rhythm with ectopy and normal rate.  II/VI SM throughout.    Abdomen:     Soft, nontender, nondistended.    Extremities:   No clubbing, cyanosis, or edema.     Results Review:    Results from last 7 days   Lab Units 04/04/24  0430   SODIUM mmol/L 141   POTASSIUM mmol/L 3.4*   CHLORIDE mmol/L 99   CO2 mmol/L 31.2*   BUN mg/dL 7*   CREATININE mg/dL 0.76   GLUCOSE mg/dL 111*   CALCIUM mg/dL 8.3*     Results from last 7 days   Lab Units 03/31/24  2127   HSTROP T ng/L 23*     Results from last 7 days   Lab Units 04/04/24  0430   WBC 10*3/mm3 10.72   HEMOGLOBIN g/dL 7.3*   HEMATOCRIT % 22.3*   PLATELETS 10*3/mm3 198                       I reviewed the patient's new clinical results.         Assessment:  1.  Enterococcus faecalis bacteremia  2.  Community-acquired pneumonia, recurrent  3.  COPD without acute exacerbation  4.  Chronic hypoxic respiratory failure, on home oxygen  5.   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  History of streptococcal bioprosthetic MVR endocarditis in 2021, treated with 6 weeks of antibiotics.  Persistent calcified vegetation remnant on MVR.  7.  Paroxysmal atrial fibrillation  8.  Celiac artery stenosis  9.  History of GI bleed in 2017 while on warfarin and shortly following after a colonoscopy  10.  Anemia of chronic disease   11.  Mild aortic stenosis  12.  Hypertension  13.  Frequent PACs    Plan:  -Scheduled with Dr. Clayton earlier today.  The CHIO showed remnants of her prior endocarditis with a persistent calcified vegetation on the bioprosthetic MVR.  However, there were no acute appearing vegetations.  She did not have endocarditis on any of her other valves.  However, given her history and the propensity for Enterococcus to cause SBE, Dr. Clayton is planning on treating her for 6 weeks with antibiotics as the benefits clearly outweigh the risks here.    -Hemoglobin has been drifting downward over the past month.  She is down to 7.3.  She does have a history of bleeding while on warfarin in the past following a colonoscopy in 2017.  If her hemoglobin trends down any further, Eliquis will need to be held.  Continue to monitor closely.    -Continue Coreg 6.25 mg twice daily.    Kamar Haddad MD  04/04/24  17:55 EDT

## 2024-04-04 NOTE — PROGRESS NOTES
"DAILY PROGRESS NOTE  [facility]    Patient Identification:  Name: Paz Browne  Age: 68 y.o.  Sex: female  :  1955  MRN: [id number]         Primary Care Physician: Javan Martinez MD      Subjective  Patient notes that her stools are becoming more loose.  Otherwise no new or acute complaints.    Objective:  General Appearance:  Comfortable, well-appearing, in no acute distress and not in pain.    Vital signs: (most recent): Blood pressure 153/91, pulse 90, temperature 98.8 °F (37.1 °C), resp. rate 18, height 170.2 cm (67\"), weight 59 kg (130 lb), SpO2 99%.    Lungs:  Normal effort and normal respiratory rate.  She is not in respiratory distress.  No stridor.  There are rhonchi.  No rales, decreased breath sounds or wheezes.  (Occasional rhonchi)  Heart: Normal rate.  Regular rhythm.    Extremities: There is no dependent edema.    Neurological: Patient is alert and oriented to person, place and time.    Skin:  Warm and dry.                  Vital signs in last 24 hours:  Temp:  [98.2 °F (36.8 °C)-100 °F (37.8 °C)] 98.8 °F (37.1 °C)  Heart Rate:  [] 90  Resp:  [16-24] 18  BP: ()/() 153/91    Intake/Output:    Intake/Output Summary (Last 24 hours) at 2024 1146  Last data filed at 2024 0831  Gross per 24 hour   Intake 250 ml   Output --   Net 250 ml         Results from last 7 days   Lab Units 24  0430 24  0540 24  0526 24  0610 24   WBC 10*3/mm3 10.72 10.05 9.55 11.24* 12.77*   HEMOGLOBIN g/dL 7.3* 7.3* 7.8* 8.2* 9.4*   PLATELETS 10*3/mm3 198 213 245 246 268     Results from last 7 days   Lab Units 24  0430 24  0540 24  0526 24  0610 03/31/24  2127   SODIUM mmol/L 141 142 140 139 141   POTASSIUM mmol/L 3.4* 3.2* 3.6 3.7 4.1   CHLORIDE mmol/L 99 101 101 101 101   CO2 mmol/L 31.2* 31.1* 28.4 30.0* 30.0*   BUN mg/dL 7* 7* 7* 6* 9   CREATININE mg/dL 0.76 0.79 0.81 0.68 0.76   GLUCOSE mg/dL 111* 109* 117* 94 " 123*   Estimated Creatinine Clearance: 64.2 mL/min (by C-G formula based on SCr of 0.76 mg/dL).  Results from last 7 days   Lab Units 04/04/24  0430 04/03/24  0540 04/02/24  0526 04/01/24  0610 03/31/24  2127   CALCIUM mg/dL 8.3* 8.3* 8.5* 8.9 9.7   ALBUMIN g/dL  --  2.9*  --   --  3.6     Results from last 7 days   Lab Units 04/03/24  0540 03/31/24  2127   ALBUMIN g/dL 2.9* 3.6   BILIRUBIN mg/dL 0.2 0.6   ALK PHOS U/L 69 94   AST (SGOT) U/L 18 19   ALT (SGPT) U/L 12 20       Assessment:  Enterococcus septicemia: Very concerning in light of her recurrent hospitalizations, previous history of SBE involving a bioprosthetic mitral valve with persistent calcified vegetation.  Repeat blood cultures negative.  Infectious disease, cardiology input greatly appreciated.    Possible SBE: See discussion above.  .ampicillin 2 g IV every 4 hours and ceftriaxone 2 g IV every 12 hours. Stop date 5/13/2024. Weekly CBC with differential and BMP faxed to [phone].  Pulmonary infiltrate/pneumonia: Pulmonary input greatly appreciated.  Should be covered by the above antibiotics.  Aspiration coagulations unremarkable.  COPD exacerbation: Resolved.    Chronic hypoxic respiratory failure: Appears to be at baseline at present.  Paroxysmal atrial fibrillation: Rate reasonably controlled.  Continue rate control and anticoagulants.  Hypertension: Continue home meds.  Monitor.  Anemia of chronic disease/iron deficiency anemia: Continue iron supplements.  Monitor.  Hypokalemia: Oral replacement.  Monitor.  Severe pulmonary hypertension:  MR/MS:      Plan:  Please see above.  Discussed with patient and son at bedside.    Gil Nicole MD  4/4/2024  11:46 EDT

## 2024-04-04 NOTE — PROGRESS NOTES
Continued Stay Note  [facility]     Patient Name: Paz Browne  MRN: [id number]  Today's Date: 4/4/2024    Admit Date: 3/31/2024    Plan: Home with spouse, HH referral pending, [facility] arranging IV antibiotics   Discharge Plan       Row Name 04/04/24 1702       Plan    Plan Home with spouse, HH referral pending, [facility] arranging IV antibiotics    Patient/Family in Agreement with Plan yes    Plan Comments Pt's plan remains home with spouse and IV antibiotics through 5/13/24 per ID MD. HH referrals pending for PICC care, [facility] arranging IV antibiotics and will deliver/complete teaching at bedside tomorrow, 4/5, per Harriet. CCP following for HH acceptance vs outpatient PICC care at ACU if no HH accepts. Juanita Johnson LCSW                   Discharge Codes    No documentation.                 Expected Discharge Date and Time       Expected Discharge Date Expected Discharge Time    Apr 5, 2024               Linnea Johnson LCSW

## 2024-04-05 ENCOUNTER — APPOINTMENT (OUTPATIENT)
Dept: GENERAL RADIOLOGY | Facility: HOSPITAL | Age: 69
DRG: 314 | End: 2024-04-05
Payer: MEDICARE

## 2024-04-05 DIAGNOSIS — A41.81 SEPTICEMIA DUE TO ENTEROCOCCUS: Primary | ICD-10-CM

## 2024-04-05 PROBLEM — I50.32 CHRONIC HEART FAILURE WITH PRESERVED EJECTION FRACTION (HFPEF): Status: ACTIVE | Noted: 2024-04-05

## 2024-04-05 LAB
ALBUMIN SERPL-MCNC: 3 G/DL (ref 3.5–5.2)
ALBUMIN/GLOB SERPL: 1 G/DL
ALP SERPL-CCNC: 74 U/L (ref 39–117)
ALT SERPL W P-5'-P-CCNC: 14 U/L (ref 1–33)
ANION GAP SERPL CALCULATED.3IONS-SCNC: 10.6 MMOL/L (ref 5–15)
AST SERPL-CCNC: 17 U/L (ref 1–32)
BILIRUB SERPL-MCNC: 0.4 MG/DL (ref 0–1.2)
BUN SERPL-MCNC: 8 MG/DL (ref 8–23)
BUN/CREAT SERPL: 10.4 (ref 7–25)
C DIFF TOX GENS STL QL NAA+PROBE: NEGATIVE
CALCIUM SPEC-SCNC: 8.6 MG/DL (ref 8.6–10.5)
CHLORIDE SERPL-SCNC: 101 MMOL/L (ref 98–107)
CO2 SERPL-SCNC: 30.4 MMOL/L (ref 22–29)
CREAT SERPL-MCNC: 0.77 MG/DL (ref 0.57–1)
DEPRECATED RDW RBC AUTO: 41.7 FL (ref 37–54)
EGFRCR SERPLBLD CKD-EPI 2021: 83.1 ML/MIN/1.73
ERYTHROCYTE [DISTWIDTH] IN BLOOD BY AUTOMATED COUNT: 14.3 % (ref 12.3–15.4)
GLOBULIN UR ELPH-MCNC: 2.9 GM/DL
GLUCOSE SERPL-MCNC: 124 MG/DL (ref 65–99)
HCT VFR BLD AUTO: 22.8 % (ref 34–46.6)
HGB BLD-MCNC: 7.4 G/DL (ref 12–15.9)
MCH RBC QN AUTO: 26.5 PG (ref 26.6–33)
MCHC RBC AUTO-ENTMCNC: 32.5 G/DL (ref 31.5–35.7)
MCV RBC AUTO: 81.7 FL (ref 79–97)
PLATELET # BLD AUTO: 229 10*3/MM3 (ref 140–450)
PMV BLD AUTO: 10.3 FL (ref 6–12)
POTASSIUM SERPL-SCNC: 3.3 MMOL/L (ref 3.5–5.2)
POTASSIUM SERPL-SCNC: 4 MMOL/L (ref 3.5–5.2)
PROT SERPL-MCNC: 5.9 G/DL (ref 6–8.5)
RBC # BLD AUTO: 2.79 10*6/MM3 (ref 3.77–5.28)
SODIUM SERPL-SCNC: 142 MMOL/L (ref 136–145)
WBC NRBC COR # BLD AUTO: 11.32 10*3/MM3 (ref 3.4–10.8)

## 2024-04-05 PROCEDURE — 94799 UNLISTED PULMONARY SVC/PX: CPT

## 2024-04-05 PROCEDURE — 02HV33Z INSERTION OF INFUSION DEVICE INTO SUPERIOR VENA CAVA, PERCUTANEOUS APPROACH: ICD-10-PCS | Performed by: HOSPITALIST

## 2024-04-05 PROCEDURE — 97530 THERAPEUTIC ACTIVITIES: CPT

## 2024-04-05 PROCEDURE — 94664 DEMO&/EVAL PT USE INHALER: CPT

## 2024-04-05 PROCEDURE — 25010000002 ONDANSETRON PER 1 MG: Performed by: NURSE PRACTITIONER

## 2024-04-05 PROCEDURE — 71045 X-RAY EXAM CHEST 1 VIEW: CPT

## 2024-04-05 PROCEDURE — 94760 N-INVAS EAR/PLS OXIMETRY 1: CPT

## 2024-04-05 PROCEDURE — 84132 ASSAY OF SERUM POTASSIUM: CPT | Performed by: HOSPITALIST

## 2024-04-05 PROCEDURE — C1751 CATH, INF, PER/CENT/MIDLINE: HCPCS

## 2024-04-05 PROCEDURE — 25010000002 AMPICILLIN PER 500 MG: Performed by: INTERNAL MEDICINE

## 2024-04-05 PROCEDURE — 85027 COMPLETE CBC AUTOMATED: CPT | Performed by: INTERNAL MEDICINE

## 2024-04-05 PROCEDURE — 94761 N-INVAS EAR/PLS OXIMETRY MLT: CPT

## 2024-04-05 PROCEDURE — 99232 SBSQ HOSP IP/OBS MODERATE 35: CPT | Performed by: INTERNAL MEDICINE

## 2024-04-05 PROCEDURE — 99232 SBSQ HOSP IP/OBS MODERATE 35: CPT | Performed by: NURSE PRACTITIONER

## 2024-04-05 PROCEDURE — 25010000002 CEFTRIAXONE PER 250 MG: Performed by: INTERNAL MEDICINE

## 2024-04-05 PROCEDURE — 80053 COMPREHEN METABOLIC PANEL: CPT | Performed by: INTERNAL MEDICINE

## 2024-04-05 PROCEDURE — 87493 C DIFF AMPLIFIED PROBE: CPT | Performed by: NURSE PRACTITIONER

## 2024-04-05 RX ORDER — HEPARIN SODIUM (PORCINE) LOCK FLUSH IV SOLN 100 UNIT/ML 100 UNIT/ML
500 SOLUTION INTRAVENOUS AS NEEDED
OUTPATIENT
Start: 2024-04-05

## 2024-04-05 RX ORDER — SODIUM CHLORIDE 0.9 % (FLUSH) 0.9 %
10 SYRINGE (ML) INJECTION AS NEEDED
OUTPATIENT
Start: 2024-04-05

## 2024-04-05 RX ORDER — SODIUM CHLORIDE 0.9 % (FLUSH) 0.9 %
10 SYRINGE (ML) INJECTION EVERY 12 HOURS SCHEDULED
Status: DISCONTINUED | OUTPATIENT
Start: 2024-04-05 | End: 2024-04-06 | Stop reason: HOSPADM

## 2024-04-05 RX ORDER — SODIUM CHLORIDE 0.9 % (FLUSH) 0.9 %
10 SYRINGE (ML) INJECTION EVERY 12 HOURS SCHEDULED
OUTPATIENT
Start: 2024-04-05

## 2024-04-05 RX ORDER — SODIUM CHLORIDE 0.9 % (FLUSH) 0.9 %
20 SYRINGE (ML) INJECTION AS NEEDED
OUTPATIENT
Start: 2024-04-05

## 2024-04-05 RX ORDER — SODIUM CHLORIDE 0.9 % (FLUSH) 0.9 %
20 SYRINGE (ML) INJECTION AS NEEDED
Status: DISCONTINUED | OUTPATIENT
Start: 2024-04-05 | End: 2024-04-06 | Stop reason: HOSPADM

## 2024-04-05 RX ORDER — SODIUM CHLORIDE 0.9 % (FLUSH) 0.9 %
10 SYRINGE (ML) INJECTION AS NEEDED
Status: DISCONTINUED | OUTPATIENT
Start: 2024-04-05 | End: 2024-04-06 | Stop reason: HOSPADM

## 2024-04-05 RX ORDER — HEPARIN SODIUM (PORCINE) LOCK FLUSH IV SOLN 100 UNIT/ML 100 UNIT/ML
300 SOLUTION INTRAVENOUS ONCE
OUTPATIENT
Start: 2024-04-05

## 2024-04-05 RX ORDER — SODIUM CHLORIDE 9 MG/ML
40 INJECTION, SOLUTION INTRAVENOUS AS NEEDED
Status: DISCONTINUED | OUTPATIENT
Start: 2024-04-05 | End: 2024-04-06 | Stop reason: HOSPADM

## 2024-04-05 RX ORDER — LOPERAMIDE HYDROCHLORIDE 2 MG/1
2 CAPSULE ORAL 4 TIMES DAILY PRN
Status: DISCONTINUED | OUTPATIENT
Start: 2024-04-05 | End: 2024-04-06 | Stop reason: HOSPADM

## 2024-04-05 RX ORDER — POTASSIUM CHLORIDE 750 MG/1
40 TABLET, FILM COATED, EXTENDED RELEASE ORAL EVERY 4 HOURS
Status: COMPLETED | OUTPATIENT
Start: 2024-04-05 | End: 2024-04-05

## 2024-04-05 RX ADMIN — AMPICILLIN SODIUM 2 G: 2 INJECTION, POWDER, FOR SOLUTION INTRAVENOUS at 08:22

## 2024-04-05 RX ADMIN — MAGNESIUM OXIDE 400 MG (241.3 MG MAGNESIUM) TABLET 400 MG: TABLET at 08:21

## 2024-04-05 RX ADMIN — AMPICILLIN SODIUM 2 G: 2 INJECTION, POWDER, FOR SOLUTION INTRAVENOUS at 00:01

## 2024-04-05 RX ADMIN — ONDANSETRON HYDROCHLORIDE 4 MG: 2 INJECTION, SOLUTION INTRAMUSCULAR; INTRAVENOUS at 17:45

## 2024-04-05 RX ADMIN — SERTRALINE 100 MG: 100 TABLET, FILM COATED ORAL at 08:21

## 2024-04-05 RX ADMIN — HYDROCODONE BITARTRATE AND ACETAMINOPHEN 1 TABLET: 7.5; 325 TABLET ORAL at 17:45

## 2024-04-05 RX ADMIN — IPRATROPIUM BROMIDE AND ALBUTEROL SULFATE 3 ML: .5; 3 SOLUTION RESPIRATORY (INHALATION) at 10:46

## 2024-04-05 RX ADMIN — HYDROCODONE BITARTRATE AND ACETAMINOPHEN 1 TABLET: 7.5; 325 TABLET ORAL at 04:57

## 2024-04-05 RX ADMIN — IPRATROPIUM BROMIDE AND ALBUTEROL SULFATE 3 ML: .5; 3 SOLUTION RESPIRATORY (INHALATION) at 21:35

## 2024-04-05 RX ADMIN — AMPICILLIN SODIUM 2 G: 2 INJECTION, POWDER, FOR SOLUTION INTRAMUSCULAR; INTRAVENOUS at 21:16

## 2024-04-05 RX ADMIN — POTASSIUM CHLORIDE 20 MEQ: 750 TABLET, EXTENDED RELEASE ORAL at 08:21

## 2024-04-05 RX ADMIN — POTASSIUM CHLORIDE 40 MEQ: 750 TABLET, EXTENDED RELEASE ORAL at 10:35

## 2024-04-05 RX ADMIN — FUROSEMIDE 40 MG: 40 TABLET ORAL at 08:21

## 2024-04-05 RX ADMIN — AMPICILLIN SODIUM 2 G: 2 INJECTION, POWDER, FOR SOLUTION INTRAVENOUS at 04:24

## 2024-04-05 RX ADMIN — POTASSIUM CHLORIDE 40 MEQ: 750 TABLET, EXTENDED RELEASE ORAL at 14:11

## 2024-04-05 RX ADMIN — CEFTRIAXONE 2000 MG: 2 INJECTION, POWDER, FOR SOLUTION INTRAMUSCULAR; INTRAVENOUS at 10:39

## 2024-04-05 RX ADMIN — ROFLUMILAST 500 MCG: 500 TABLET ORAL at 08:21

## 2024-04-05 RX ADMIN — CARVEDILOL 6.25 MG: 6.25 TABLET, FILM COATED ORAL at 17:42

## 2024-04-05 RX ADMIN — BUDESONIDE 0.5 MG: 0.5 INHALANT RESPIRATORY (INHALATION) at 07:40

## 2024-04-05 RX ADMIN — BUDESONIDE 0.5 MG: 0.5 INHALANT RESPIRATORY (INHALATION) at 21:35

## 2024-04-05 RX ADMIN — FUROSEMIDE 40 MG: 40 TABLET ORAL at 17:42

## 2024-04-05 RX ADMIN — LISINOPRIL 40 MG: 40 TABLET ORAL at 08:21

## 2024-04-05 RX ADMIN — LOPERAMIDE HYDROCHLORIDE 2 MG: 2 CAPSULE ORAL at 10:35

## 2024-04-05 RX ADMIN — PANTOPRAZOLE SODIUM 40 MG: 40 TABLET, DELAYED RELEASE ORAL at 05:00

## 2024-04-05 RX ADMIN — IPRATROPIUM BROMIDE AND ALBUTEROL SULFATE 3 ML: .5; 3 SOLUTION RESPIRATORY (INHALATION) at 07:40

## 2024-04-05 RX ADMIN — HYDROCODONE BITARTRATE AND ACETAMINOPHEN 1 TABLET: 7.5; 325 TABLET ORAL at 10:44

## 2024-04-05 RX ADMIN — IPRATROPIUM BROMIDE AND ALBUTEROL SULFATE 3 ML: .5; 3 SOLUTION RESPIRATORY (INHALATION) at 15:28

## 2024-04-05 RX ADMIN — ATORVASTATIN CALCIUM 40 MG: 20 TABLET, FILM COATED ORAL at 21:18

## 2024-04-05 RX ADMIN — CETIRIZINE HYDROCHLORIDE 10 MG: 10 TABLET ORAL at 08:21

## 2024-04-05 RX ADMIN — AMPICILLIN SODIUM 2 G: 2 INJECTION, POWDER, FOR SOLUTION INTRAMUSCULAR; INTRAVENOUS at 12:17

## 2024-04-05 RX ADMIN — ROPINIROLE 2 MG: 2 TABLET, FILM COATED ORAL at 21:18

## 2024-04-05 RX ADMIN — CYCLOBENZAPRINE 10 MG: 10 TABLET, FILM COATED ORAL at 21:18

## 2024-04-05 RX ADMIN — CARVEDILOL 6.25 MG: 6.25 TABLET, FILM COATED ORAL at 08:21

## 2024-04-05 RX ADMIN — ALBUTEROL SULFATE 2.5 MG: 2.5 SOLUTION RESPIRATORY (INHALATION) at 01:31

## 2024-04-05 RX ADMIN — ACETAMINOPHEN 325MG 650 MG: 325 TABLET ORAL at 16:15

## 2024-04-05 RX ADMIN — AMPICILLIN SODIUM 2 G: 2 INJECTION, POWDER, FOR SOLUTION INTRAMUSCULAR; INTRAVENOUS at 17:34

## 2024-04-05 RX ADMIN — Medication 1 TABLET: at 08:21

## 2024-04-05 RX ADMIN — APIXABAN 5 MG: 5 TABLET, FILM COATED ORAL at 21:18

## 2024-04-05 RX ADMIN — AMLODIPINE BESYLATE 10 MG: 10 TABLET ORAL at 08:21

## 2024-04-05 RX ADMIN — APIXABAN 5 MG: 5 TABLET, FILM COATED ORAL at 08:21

## 2024-04-05 RX ADMIN — Medication 10 ML: at 08:30

## 2024-04-05 NOTE — PROGRESS NOTES
Continued Stay Note  [facility]     Patient Name: Paz Browne  MRN: [id number]  Today's Date: 4/5/2024    Admit Date: 3/31/2024    Plan: Home with spouse, [facility], and [facility] for weekly PICC care/labs   Discharge Plan       Row Name 04/05/24 1556       Plan    Plan Home with spouse, [facility], and [facility] for weekly PICC care/labs    Patient/Family in Agreement with Plan yes    Plan Comments Pt to d/c home with spouse, [facility] arranged per Harriet, and [facility] scheduled for weekly PICC care/labs as pt has been denied by all  agencies due to insurance/staffing. Weekly ACU appointments in [facility], first appointment next Friday, 4/12, at 12:30 PM. Pt/family updated at bedside. Juanita Johnson LCSW                   Discharge Codes    No documentation.                 Expected Discharge Date and Time       Expected Discharge Date Expected Discharge Time    Apr 6, 2024               Linnea Johnson LCSW

## 2024-04-05 NOTE — THERAPY TREATMENT NOTE
Patient Name: Paz Browne  : 1955    MRN: [id number]                              Today's Date: 2024       Admit Date: 3/31/2024    Visit Dx:     ICD-10-CM ICD-9-CM   1. Pneumonia due to infectious organism, unspecified laterality, unspecified part of lung  J18.9 486     Patient Active Problem List   Diagnosis    PAF (paroxysmal atrial fibrillation)    Hypertension    Hyperlipidemia    Pain medication agreement    Hiatal hernia    Primary osteoarthritis involving multiple joints    Pulmonary hypertension    S/P TVR (tricuspid valve repair)    Pulmonary nodule    Restless leg syndrome    Chronic low back pain    Dysplastic polyp of colon    History of mitral valve replacement with tissue graft    Chronic hypoxemic respiratory failure    Anemia    Celiac artery stenosis    Intertrigo    Abnormal EKG    Muscle spasms of both lower extremities    Iron deficiency anemia    Adenomatous polyp of colon    Gastroesophageal reflux disease without esophagitis    Headache    History of endocarditis    Pneumonia of both lower lobes due to infectious organism    Peptic ulcer disease    Peripheral vascular disease    Hyperlipidemia    Hyperglycemia    COPD with acute exacerbation    Chronic diastolic CHF (congestive heart failure)    Chronic bilateral low back pain    COPD exacerbation    Leukocytosis    Elevated troponin    Pneumonia    Acute-on-chronic respiratory failure    Septicemia due to enterococcus     Past Medical History:   Diagnosis Date    VAN (acute kidney injury)     Anemia     Asthma     Atrial flutter     cardioversion    Cataract     Celiac artery stenosis     Chronic respiratory failure with hypoxia     Colon polyp     COPD (chronic obstructive pulmonary disease)     GI bleed     Hiatal hernia     History of CHF (congestive heart failure)     due to MR    History of home oxygen therapy     3 lpm NC    History of mitral valve replacement with tissue graft     Hyperlipidemia     Hypertension      Infectious viral hepatitis     B    Intertrigo     Long term (current) use of anticoagulants     Mitral regurgitation     s/p tissue MVR    PAF (paroxysmal atrial fibrillation)     s/p MAZE    Pneumonia     Pulmonary hypertension     S/P TVR (tricuspid valve repair) 7/7/2016     Past Surgical History:   Procedure Laterality Date    CARDIAC CATHETERIZATION  09/01/2014    Right dominant systemt, normal coronary arteries.     CARDIAC CATHETERIZATION Left 6/10/2016    Procedure: Cardiac catheterization;  Surgeon: Sergei Hall MD;  Location: [facility] CATH INVASIVE LOCATION;  Service:     CARDIAC CATHETERIZATION N/A 6/10/2016    Procedure: Right Heart Cath;  Surgeon: Sergei Hall MD;  Location: [facility] CATH INVASIVE LOCATION;  Service:     CATARACT EXTRACTION      COLONOSCOPY      COLONOSCOPY N/A 8/4/2017    Procedure: COLONOSCOPY TO CECUM/TI WITH POLYPECTOMY ( COLD BX);  Surgeon: Cleveland Devine MD;  Location: [facility] ENDOSCOPY;  Service:     COLONOSCOPY N/A 8/10/2017    Procedure: COLONOSCOPY to cecum and TI with 2 clips placed at transverse;  Surgeon: Earnest PALOMO MD;  Location: [facility] ENDOSCOPY;  Service:     COLONOSCOPY N/A 12/22/2017    Procedure: COLONOSCOPY INTO CECUM WITH COLD POLYPECTOMIES;  Surgeon: Cleveland Devine MD;  Location: [facility] ENDOSCOPY;  Service:     COLONOSCOPY N/A 5/17/2021    Procedure: COLONOSCOPY to cecum;  Surgeon: Cleveland Devine MD;  Location: [facility] ENDOSCOPY;  Service: Gastroenterology;  Laterality: N/A;  Pre: Fe deficency anemia, h/x of polyps  Post: fair prep, normal    ENDOSCOPY N/A 8/17/2017    Procedure: ESOPHAGOGASTRODUODENOSCOPY;  Surgeon: Porsha Ruby MD;  Location: [facility] ENDOSCOPY;  Service:     ENDOSCOPY N/A 12/22/2017    Procedure: ESOPHAGOGASTRODUODENOSCOPY WITH BIOPSIES;  Surgeon: Cleveland Devine MD;  Location: [facility] ENDOSCOPY;  Service:     ENDOSCOPY N/A 5/17/2021    Procedure: ESOPHAGOGASTRODUODENOSCOPY  with biopsies;  Surgeon: Cleveland Devine MD;  Location: [facility] ENDOSCOPY;   Service: Gastroenterology;  Laterality: N/A;  Pre: Fe deficency anemia, nausea, heme positive stool   Post: gastritis, sloughing of distal esophagus mucosa    GALLBLADDER SURGERY      HEMORRHOIDECTOMY      HYSTERECTOMY      KIDNEY SURGERY  04/22/2013    Stent placement    MAZE PROCEDURE      MITRAL VALVE REPLACEMENT      TONSILLECTOMY      TRICUSPID VALVE REPLACEMENT        General Information       Row Name 04/05/24 1035          Physical Therapy Time and Intention    Document Type therapy note (daily note)  -PH     Mode of Treatment physical therapy  -PH       Row Name 04/05/24 1035          General Information    Existing Precautions/Restrictions fall;oxygen therapy device and L/min  -PH       Row Name 04/05/24 1035          Safety Issues, Functional Mobility    Impairments Affecting Function (Mobility) endurance/activity tolerance;strength;shortness of breath;balance  -PH     Comment, Safety Issues/Impairments (Mobility) gt belt and non skid socks donned; desat w/ amb  -PH               User Key  (r) = Recorded By, (t) = Taken By, (c) = Cosigned By      Initials Name Provider Type    PH Nyla Livingston PTA Physical Therapist Assistant                   Mobility       Row Name 04/05/24 1035          Bed Mobility    Bed Mobility sit-supine  -PH     Sit-Supine [place] (Bed Mobility) contact guard;verbal cues;minimum assist (75% patient effort)  -PH     Assistive Device (Bed Mobility) head of bed elevated  -PH     Comment, (Bed Mobility) pt at 84% after amb and performed PLB; req a little assist for B LE  -PH       Row Name 04/05/24 1035          Sit-Stand Transfer    Sit-Stand [place] (Transfers) standby assist  -PH     Assistive Device (Sit-Stand Transfers) other (see comments)  no AD  -PH     Comment, (Sit-Stand Transfer) from recliner x 1; from EOB x 1  -PH       Row Name 04/05/24 1035          Gait/Stairs (Locomotion)    [place] Level (Gait) supervision  -PH     Assistive Device (Gait)  other (see comments)  no AD  -PH     Distance in Feet (Gait) 40  -PH     Deviations/Abnormal Patterns (Gait) gait speed decreased  -PH     [place] Level (Stairs) not tested  -PH     Comment, (Gait/Stairs) slow w/ no overt LOB; pt reported SOA and requested to return to EOB; pt's sats at 84% w/ pt returned to bed  -PH               User Key  (r) = Recorded By, (t) = Taken By, (c) = Cosigned By      Initials Name Provider Type     Nyla Livingston PTA Physical Therapist Assistant                   Obj/Interventions       Row Name 04/05/24 1038          Balance    Balance Assessment sitting static balance;standing static balance  -PH     Static Sitting Balance independent  -PH     Static Standing Balance standby assist  -PH     Dynamic Standing Balance other (see comments)  no AD  -PH               User Key  (r) = Recorded By, (t) = Taken By, (c) = Cosigned By      Initials Name Provider Type     Nyla Livingston PTA Physical Therapist Assistant                   Goals/Plan    No documentation.                  Clinical Impression       Row Name 04/05/24 1038          Pain    Pretreatment Pain Rating 9/10  -PH     Posttreatment Pain Rating 9/10  -PH     Pain Location - neck;back  -PH     Pain Intervention(s) Repositioned;Ambulation/increased activity;Rest  -PH       Row Name 04/05/24 1038          Plan of Care Review    Plan of Care Reviewed With patient  -PH     Progress no change  -PH     Outcome Evaluation Pt was seen by PT this AM for tx. Pt was [facility] and stood w/ SBA. Pt's sats were at 94% at beg of session w/ pt on 3L throughout session. Pt amb 40' then requested to return and sit at EOB. Sats were at 84% w/ pt returned to bed w/ min A for B LE as she performed PLB. Pt's sats were at 90% after approx 4 min. Notified RN of pt request for breathing tx. Next session 8/8.  -PH       Row Name 04/05/24 1038          Vital Signs    Pre SpO2 (%) 84  -PH     O2 Delivery Pre Treatment nasal cannula   -PH     Intra SpO2 (%) 84  -PH     O2 Delivery Intra Treatment nasal cannula  -PH     Post SpO2 (%) 90  -PH     O2 Delivery Post Treatment nasal cannula  -PH       Row Name 04/05/24 1038          Positioning and Restraints    Pre-Treatment Position sitting in chair/recliner  -PH     Post Treatment Position bed  -PH     In Bed fowlers;call light within reach;encouraged to call for assist;exit alarm on;notified nsg  -PH               User Key  (r) = Recorded By, (t) = Taken By, (c) = Cosigned By      Initials Name Provider Type     Nyla Livingston PTA Physical Therapist Assistant                   Outcome Measures       Row Name 04/05/24 1041          How much help from another person do you currently need...    Turning from your back to your side while in flat bed without using bedrails? 3  -PH     Moving from lying on back to sitting on the side of a flat bed without bedrails? 3  -PH     Moving to and from a bed to a chair (including a wheelchair)? 3  -PH     Standing up from a chair using your arms (e.g., wheelchair, bedside chair)? 3  -PH     Climbing 3-5 steps with a railing? 3  -PH     To walk in hospital room? 3  -PH     AM-PAC 6 Clicks Score (PT) 18  -PH     Highest Level of Mobility Goal 6 --> Walk 10 steps or more  -PH       Row Name 04/05/24 1041          Functional Assessment    Outcome Measure Options AM-PAC 6 Clicks Basic Mobility (PT)  -PH               User Key  (r) = Recorded By, (t) = Taken By, (c) = Cosigned By      Initials Name Provider Type     Nyla Livingston PTA Physical Therapist Assistant                                 Physical Therapy Education       Title: PT OT SLP Therapies (Done)       Topic: Physical Therapy (Done)       Point: Mobility training (Done)       Learning Progress Summary             Patient Acceptance, E, VU by  at 4/5/2024 1042    Acceptance, E,TB,D, VU by  at 4/3/2024 1300    Acceptance, E,TB,D, VU by  at 4/2/2024 1128    Acceptance, E, VU by   at 4/1/2024 0847                         Point: Home exercise program (Done)       Learning Progress Summary             Patient Acceptance, E,TB,D, VU by  at 4/3/2024 1300    Acceptance, E,TB,D, VU by PH at 4/2/2024 1128                         Point: Body mechanics (Done)       Learning Progress Summary             Patient Acceptance, E,TB,D, VU by PH at 4/3/2024 1300    Acceptance, E,TB,D, VU by PH at 4/2/2024 1128                         Point: Precautions (Done)       Learning Progress Summary             Patient Acceptance, E,TB,D, VU by PH at 4/3/2024 1300    Acceptance, E,TB,D, VU by PH at 4/2/2024 1128                                         User Key       Initials Effective Dates Name Provider Type Discipline     06/16/21 -  Nyla Livingston PTA Physical Therapist Assistant PT     12/13/22 -  Stephanie Vazquez PT Physical Therapist PT                  PT Recommendation and Plan     Plan of Care Reviewed With: patient  Progress: no change  Outcome Evaluation: Pt was seen by PT this AM for tx. Pt was [facility] and stood w/ SBA. Pt's sats were at 94% at beg of session w/ pt on 3L throughout session. Pt amb 40' then requested to return and sit at EOB. Sats were at 84% w/ pt returned to bed w/ min A for B LE as she performed PLB. Pt's sats were at 90% after approx 4 min. Notified RN of pt request for breathing tx. Next session 8/8.     Time Calculation:         PT Charges       Row Name 04/05/24 1042             Time Calculation    Start Time 1013  -PH      Stop Time 1029  -PH      Time Calculation (min) 16 min  -PH      PT Received On 04/05/24  -PH      PT - Next Appointment 04/08/24  -PH         Timed Charges    12964 - PT Therapeutic Activity Minutes 16  -PH         Total Minutes    Timed Charges Total Minutes 16  -PH       Total Minutes 16  -PH                User Key  (r) = Recorded By, (t) = Taken By, (c) = Cosigned By      Initials Name Provider Type     Nyla Livingston PTA Physical  Therapist Assistant                  Therapy Charges for Today       Code Description Service Date Service Provider Modifiers Qty    [id number]  PT THERAPEUTIC ACT EA 15 MIN 4/5/2024 Nyla Livingston, AYUSH GP 1            PT G-Codes  Outcome Measure Options: AM-PAC 6 Clicks Basic Mobility (PT)  AM-PAC 6 Clicks Score (PT): 18  PT Discharge Summary  Anticipated Discharge Disposition (PT): home with home health, home with assist    Nyla Livingston PTA  4/5/2024

## 2024-04-05 NOTE — PROGRESS NOTES
"DAILY PROGRESS NOTE  [facility]    Patient Identification:  Name: Paz Browne  Age: 68 y.o.  Sex: female  :  1955  MRN: [id number]         Primary Care Physician: Javan Martinez MD      Subjective  Patient had another bout of shortness of air last night.  Loose stools that progressed to frequent liquid brown stools.  Occasionally green-tinged.  No blood or mucus.  No abdominal pain.    Objective:  General Appearance:  Comfortable, well-appearing, in no acute distress and not in pain.    Vital signs: (most recent): Blood pressure 122/78, pulse 113, temperature 97.9 °F (36.6 °C), temperature source Oral, resp. rate 16, height 170.2 cm (67\"), weight 59 kg (130 lb), SpO2 91%.    Lungs:  Normal effort and normal respiratory rate.  She is not in respiratory distress.  No stridor.  There are wheezes.  No rales, decreased breath sounds or rhonchi.  (Occasional end expiratory wheeze.)  Heart: Normal rate.  Regular rhythm.    Abdomen: Abdomen is soft.    Extremities: There is no dependent edema.    Neurological: Patient is alert and oriented to person, place and time.    Skin:  Warm and dry.                  Vital signs in last 24 hours:  Temp:  [98.1 °F (36.7 °C)-99.1 °F (37.3 °C)] 98.1 °F (36.7 °C)  Heart Rate:  [] 103  Resp:  [18-22] 22  BP: ()/(60-91) 151/67    Intake/Output:    Intake/Output Summary (Last 24 hours) at 2024 0859  Last data filed at 2024  Gross per 24 hour   Intake 100 ml   Output --   Net 100 ml         Results from last 7 days   Lab Units 24  0526 24  0430 24  0540 24  0526 24  0610 247   WBC 10*3/mm3 11.32* 10.72 10.05 9.55 11.24* 12.77*   HEMOGLOBIN g/dL 7.4* 7.3* 7.3* 7.8* 8.2* 9.4*   PLATELETS 10*3/mm3 229 198 213 245 246 268     Results from last 7 days   Lab Units 24  0526 24  0430 24  0540 24  0526 24  0610 24  2127   SODIUM mmol/L 142 141 142 140 139 141 "   POTASSIUM mmol/L 3.3* 3.4* 3.2* 3.6 3.7 4.1   CHLORIDE mmol/L 101 99 101 101 101 101   CO2 mmol/L 30.4* 31.2* 31.1* 28.4 30.0* 30.0*   BUN mg/dL 8 7* 7* 7* 6* 9   CREATININE mg/dL 0.77 0.76 0.79 0.81 0.68 0.76   GLUCOSE mg/dL 124* 111* 109* 117* 94 123*   Estimated Creatinine Clearance: 63.3 mL/min (by C-G formula based on SCr of 0.77 mg/dL).  Results from last 7 days   Lab Units 04/05/24  0526 04/04/24  0430 04/03/24  0540 04/02/24  0526 04/01/24  0610 03/31/24  2127   CALCIUM mg/dL 8.6 8.3* 8.3* 8.5* 8.9 9.7   ALBUMIN g/dL 3.0*  --  2.9*  --   --  3.6     Results from last 7 days   Lab Units 04/05/24  0526 04/03/24  0540 03/31/24  2127   ALBUMIN g/dL 3.0* 2.9* 3.6   BILIRUBIN mg/dL 0.4 0.2 0.6   ALK PHOS U/L 74 69 94   AST (SGOT) U/L 17 18 19   ALT (SGPT) U/L 14 12 20       Assessment:  Enterococcus septicemia: Very concerning in light of her recurrent hospitalizations, previous history of SBE involving a bioprosthetic mitral valve with persistent calcified vegetation.  Repeat blood cultures negative.  Infectious disease, cardiology input greatly appreciated.    Possible SBE: See discussion above.  .ampicillin 2 g IV every 4 hours and ceftriaxone 2 g IV every 12 hours. Stop date 5/13/2024. Weekly CBC with differential and BMP faxed to [phone].  Pulmonary infiltrate/pneumonia: Pulmonary input greatly appreciated.  Should be covered by the above antibiotics.  Aspiration evaluation unremarkable.  COPD exacerbation: Continue bronchodilators.  Pulmonary input appreciated.  Chronic hypoxic respiratory failure: Appears to be at baseline at present.  Paroxysmal atrial fibrillation: Rate reasonably controlled.  Continue rate control and anticoagulants.  Hypertension: Continue home meds.  Monitor.  Anemia of chronic disease/iron deficiency anemia: Continue iron supplements.  Monitor.  Hypokalemia: Initiate potassium protocol.  Monitor.    Severe pulmonary hypertension:  Diarrhea: Antibiotic associated.  No clinical  evidence of C. difficile.  C. difficile PCR negative.  As needed Imodium.  Hold oral magnesium.  Monitor.      Plan:  Please see above.  Discussed with patient and son at bedside.    Gil Nicole MD  4/5/2024  08:59 EDT

## 2024-04-05 NOTE — PLAN OF CARE
Goal Outcome Evaluation:  Plan of Care Reviewed With: patient, spouse        Progress: improving

## 2024-04-05 NOTE — PROGRESS NOTES
"    Patient Name: Paz Browne  :1955  68 y.o.      Patient Care Team:  Javan Martinez MD as PCP - General (Internal Medicine)  Ag Melo Jr., MD as Consulting Physician (Pulmonary Disease)  Cleveland Devine MD as Consulting Physician (Gastroenterology)  Earnest Gan MD as Consulting Physician (Cardiology)  Daniela Shin MD PhD as Consulting Physician (Hematology and Oncology)    Chief Complaint: follow up heart bacteremia, tissue mitral valve replacement , prior endocarditis, PAF    Interval History: getting PICC line. Possible discharge tomorrow.        Objective   Vital Signs  Temp:  [98.1 °F (36.7 °C)-99.1 °F (37.3 °C)] 98.4 °F (36.9 °C)  Heart Rate:  [] 86  Resp:  [18-22] 22  BP: ()/(60-87) 121/63    Intake/Output Summary (Last 24 hours) at 2024 1648  Last data filed at 2024  Gross per 24 hour   Intake 100 ml   Output --   Net 100 ml     Flowsheet Rows      Flowsheet Row First Filed Value   Admission Height 170.2 cm (67\") Documented at 2024   Admission Weight 59 kg (130 lb) Documented at 2024            Physical Exam:   General Appearance:    Alert, cooperative, in no acute distress   Lungs:     Clear to auscultation.  Normal respiratory effort and rate.      Heart:    Regular rhythm and normal rate, normal S1 and S2, no murmurs, gallops or rubs.     Chest Wall:    No abnormalities observed   Abdomen:     Soft, nontender, positive bowel sounds.     Extremities:   no cyanosis, clubbing or edema.  No marked joint deformities.  Adequate musculoskeletal strength.       Results Review:    Results from last 7 days   Lab Units 24  0526   SODIUM mmol/L 142   POTASSIUM mmol/L 3.3*   CHLORIDE mmol/L 101   CO2 mmol/L 30.4*   BUN mg/dL 8   CREATININE mg/dL 0.77   GLUCOSE mg/dL 124*   CALCIUM mg/dL 8.6     Results from last 7 days   Lab Units 24   HSTROP T ng/L 23*     Results from last 7 days   Lab Units 24  0526   WBC " 10*3/mm3 11.32*   HEMOGLOBIN g/dL 7.4*   HEMATOCRIT % 22.8*   PLATELETS 10*3/mm3 229                           Medication Review:   amLODIPine, 10 mg, Oral, Daily  ampicillin, 2 g, Intravenous, Q4H  apixaban, 5 mg, Oral, Q12H  atorvastatin, 40 mg, Oral, Nightly  budesonide, 0.5 mg, Nebulization, BID - RT  carvedilol, 6.25 mg, Oral, BID With Meals  cefTRIAXone, 2,000 mg, Intravenous, Q12H  cetirizine, 10 mg, Oral, Daily  cyclobenzaprine, 10 mg, Oral, Nightly  furosemide, 40 mg, Oral, BID  ipratropium-albuterol, 3 mL, Nebulization, 4x Daily - RT  lisinopril, 40 mg, Oral, Daily  multivitamin with minerals, 1 tablet, Oral, Daily  pantoprazole, 40 mg, Oral, Q AM  potassium chloride, 20 mEq, Oral, Daily  roflumilast, 500 mcg, Oral, Daily  rOPINIRole, 2 mg, Oral, Nightly  sertraline, 100 mg, Oral, Daily  sodium chloride, 10 mL, Intravenous, Q12H         O2, 2 L/min, Last Rate: 2 L/min (04/02/24 0659)        Assessment & Plan   Enterococcus bacteremia   Valvular heart disease status post 31 mm tissue mitral valve replacement , tricuspid valve repair and left atrial appendage closure 2014.  Pneumonia with new infiltrates on CXR> pulm following. No evidence of COPDAE.   History of MVR endocarditis, treated with 6 weeks of antibiotics. CHIO 4/4/24 showed persistent calcified vegetation. No evidence of acute endocarditis and other valves looked ok. ID treated with 6 weeks of abx.   Paroxysmal atrial fibrillation  History of GI bleed on warfarin  Anemia of chronic disease. H/H is low but unchanged from yesterday. She remains on apixaban.   Mild aortic valve stenosis  PACs.   Hypertension   History of GIB while on warfarin.      - blood pressure is quite labile. SBP . Would just watch for now.    - Hgb is low but relatively unchanged.    - abx per ID. Getting PICC line today.    - on apixaban, dosed appropriately for age and normal kidney function.    - lots of atrial ectopy but rhythm over all is stable on beta blocker.      Looks like she should be discharge tomorrow. No objection from cardiac standpoint. Will see as needed. Follow up in 6 weeks with Dr. Mendez or NP. Office will call to arrange.     BA Pimentel  [facility]  04/05/24  16:48 EDT

## 2024-04-05 NOTE — PROGRESS NOTES
LOS: 3 days   Patient Care Team:  Javan Martinez MD as PCP - General (Internal Medicine)  Ag Melo Jr., MD as Consulting Physician (Pulmonary Disease)  Cleveland Devine MD as Consulting Physician (Gastroenterology)  Earnest Gan MD as Consulting Physician (Cardiology)  Daniela Shin MD PhD as Consulting Physician (Hematology and Oncology)    Subjective     Following for COPD with acute exacerbation and chronic respiratory failure.    Breathing is getting back to her baseline.  Her biggest complaint today is some diarrhea.  She is not really coughing much and no significant sputum.    Review of Systems:          Objective     Vital Signs  Vital Sign Min/Max for last 24 hours  Temp  Min: 98.1 °F (36.7 °C)  Max: 99.1 °F (37.3 °C)   BP  Min: 96/83  Max: 157/89   Pulse  Min: 87  Max: 115   Resp  Min: 18  Max: 22   SpO2  Min: 88 %  Max: 100 %   Flow (L/min)  Min: 2  Max: 3   No data recorded        Ventilator/Non-Invasive Ventilation Settings (From admission, onward)      None                         Body mass index is 20.36 kg/m².  I/O last 3 completed shifts:  In: 350 [I.V.:250; IV Piggyback:100]  Out: -   No intake/output data recorded.        Physical Exam:    General Appearance: Older white female standard bed in no apparent distress on 2 L O2 with oxygen saturations of 96%  Eyes: Conjunctiva are clear and anicteric  ENT: Mucous membranes are moist no erythema or exudates, Mallampati type II airway, nasal septum midline  Neck: No adenopathy or thyromegaly no jugular venous distension trachea midline  Lungs: Decreased overall but I do not hear any wheezes rales or rhonchi no dullness symmetric expansion she is not labored on 2 L O2  Cardiac: Regular rate rhythm did not hear a murmur today  Abdomen: Soft nontender no palpable hepatosplenomegaly or masses  : Not examined  Musculoskeletal: Grossly normal  Skin: Warm and dry no jaundice no petechiae  Neuro: She is alert and oriented she is  cooperative following commands and grossly intact  Extremities/P Vascular: No clubbing no cyanosis no edema palpable radial and dorsalis pedis pulses  MSE: Seems to be in pretty good spirits today     Labs:  Results from last 7 days   Lab Units 04/05/24 0526 04/04/24 0430 04/03/24 0540 04/02/24 0526 04/01/24 0610 03/31/24 2127   GLUCOSE mg/dL 124* 111* 109* 117* 94 123*   SODIUM mmol/L 142 141 142 140 139 141   POTASSIUM mmol/L 3.3* 3.4* 3.2* 3.6 3.7 4.1   CO2 mmol/L 30.4* 31.2* 31.1* 28.4 30.0* 30.0*   CHLORIDE mmol/L 101 99 101 101 101 101   ANION GAP mmol/L 10.6 10.8 9.9 10.6 8.0 10.0   CREATININE mg/dL 0.77 0.76 0.79 0.81 0.68 0.76   BUN mg/dL 8 7* 7* 7* 6* 9   BUN / CREAT RATIO  10.4 9.2 8.9 8.6 8.8 11.8   CALCIUM mg/dL 8.6 8.3* 8.3* 8.5* 8.9 9.7   ALK PHOS U/L 74  --  69  --   --  94   TOTAL PROTEIN g/dL 5.9*  --  5.6*  --   --  6.6   ALT (SGPT) U/L 14  --  12  --   --  20   AST (SGOT) U/L 17  --  18  --   --  19   BILIRUBIN mg/dL 0.4  --  0.2  --   --  0.6   ALBUMIN g/dL 3.0*  --  2.9*  --   --  3.6   GLOBULIN gm/dL 2.9  --  2.7  --   --  3.0     Estimated Creatinine Clearance: 63.3 mL/min (by C-G formula based on SCr of 0.77 mg/dL).      Results from last 7 days   Lab Units 04/05/24 0526 04/04/24 0430 04/03/24 0540 04/02/24 0526 04/01/24 0610 03/31/24 2127   WBC 10*3/mm3 11.32* 10.72 10.05 9.55 11.24* 12.77*   RBC 10*6/mm3 2.79* 2.68* 2.68* 2.94* 3.10* 3.43*   HEMOGLOBIN g/dL 7.4* 7.3* 7.3* 7.8* 8.2* 9.4*   HEMATOCRIT % 22.8* 22.3* 22.4* 24.8* 25.8* 29.4*   MCV fL 81.7 83.2 83.6 84.4 83.2 85.7   MCH pg 26.5* 27.2 27.2 26.5* 26.5* 27.4   MCHC g/dL 32.5 32.7 32.6 31.5 31.8 32.0   RDW % 14.3 14.4 14.4 14.6 14.5 14.5   RDW-SD fl 41.7 43.3 44.1 44.7 43.4 44.9   MPV fL 10.3 10.5 10.4 10.3 10.4 10.0   PLATELETS 10*3/mm3 229 198 213 245 246 268   NEUTROPHIL % %  --   --  84.3* 82.5* 87.3* 85.8*   LYMPHOCYTE % %  --   --  8.5* 9.5* 6.8* 7.9*   MONOCYTES % %  --   --  5.4 5.5 4.3* 4.3*   EOSINOPHIL % %  --    --  0.9 1.0 0.4 0.9   BASOPHIL % %  --   --  0.3 0.3 0.2 0.2   IMM GRAN % %  --   --  0.6* 1.2* 1.0* 0.9*   NEUTROS ABS 10*3/mm3  --   --  8.48* 7.87* 9.82* 10.96*   LYMPHS ABS 10*3/mm3  --   --  0.85 0.91 0.76 1.01   MONOS ABS 10*3/mm3  --   --  0.54 0.53 0.48 0.55   EOS ABS 10*3/mm3  --   --  0.09 0.10 0.05 0.11   BASOS ABS 10*3/mm3  --   --  0.03 0.03 0.02 0.03   IMMATURE GRANS (ABS) 10*3/mm3  --   --  0.06* 0.11* 0.11* 0.11*   NRBC /100 WBC  --   --  0.0 0.0 0.0 0.0         Results from last 7 days   Lab Units 03/31/24 2127   HSTROP T ng/L 23*     Results from last 7 days   Lab Units 03/31/24 2127   PROBNP pg/mL 2,009.0*         Results from last 7 days   Lab Units 04/02/24  0526 03/31/24 2223 03/31/24 2127   LACTATE mmol/L  --  0.8  --    PROCALCITONIN ng/mL 0.10  --  0.10         Microbiology Results (last 10 days)       Procedure Component Value - Date/Time    Clostridioides difficile Toxin - Stool, Per Rectum [[id number]]  (Normal) Collected: 04/05/24 0430    Lab Status: Final result Specimen: Stool from Per Rectum Updated: 04/05/24 0653    Narrative:      The following orders were created for panel order Clostridioides difficile Toxin - Stool, Per Rectum.  Procedure                               Abnormality         Status                     ---------                               -----------         ------                     Clostridioides difficile...[[id number]]  Normal              Final result                 Please view results for these tests on the individual orders.    Clostridioides difficile Toxin, PCR - Stool, Per Rectum [[id number]]  (Normal) Collected: 04/05/24 0430    Lab Status: Final result Specimen: Stool from Per Rectum Updated: 04/05/24 0653     Toxigenic C. difficile by PCR Negative    Narrative:      The result indicates the absence of toxigenic C. difficile from stool specimen.     Blood Culture - Blood, Hand, Left [[id number]]  (Normal) Collected: 04/02/24 0930    Lab Status:  Preliminary result Specimen: Blood from Hand, Left Updated: 04/05/24 0945     Blood Culture No growth at 3 days    Blood Culture - Blood, Arm, Left [[id number]]  (Normal) Collected: 04/02/24 0914    Lab Status: Preliminary result Specimen: Blood from Arm, Left Updated: 04/05/24 0945     Blood Culture No growth at 3 days    MRSA Screen, PCR (Inpatient) - Swab, Nares [[id number]]  (Normal) Collected: 04/01/24 1047    Lab Status: Final result Specimen: Swab from Nares Updated: 04/01/24 1216     MRSA PCR No MRSA Detected    Narrative:      The negative predictive value of this diagnostic test is high and should only be used to consider de-escalating anti-MRSA therapy. A positive result may indicate colonization with MRSA and must be correlated clinically.    Blood Culture - Blood, Arm, Left [[id number]]  (Abnormal)  (Susceptibility) Collected: 03/31/24 2325    Lab Status: Final result Specimen: Blood from Arm, Left Updated: 04/03/24 0640     Blood Culture Enterococcus faecalis     Comment:   Infectious disease consultation is highly recommended.        Isolated from Aerobic and Anaerobic Bottles     Gram Stain Aerobic Bottle Gram positive cocci in pairs      Anaerobic Bottle Gram positive cocci in pairs    Susceptibility        Enterococcus faecalis      [place]      Ampicillin Susceptible      Gentamicin High Level Synergy Resistant      Streptomycin High Level Synergy Susceptible      Vancomycin Susceptible                           Blood Culture - Blood, Arm, Right [[id number]]  (Abnormal) Collected: 03/31/24 2325    Lab Status: Final result Specimen: Blood from Arm, Right Updated: 04/03/24 0640     Blood Culture Enterococcus faecalis     Comment:   Infectious disease consultation is highly recommended.        Isolated from Aerobic and Anaerobic Bottles     Gram Stain Anaerobic Bottle Gram positive cocci in pairs      Aerobic Bottle Gram positive cocci in pairs    Narrative:      Refer to previous blood culture collected on  03/31/2024 2325 for MICs    Blood Culture ID, PCR - Blood, Arm, Left [[id number]]  (Abnormal) Collected: 03/31/24 2325    Lab Status: Final result Specimen: Blood from Arm, Left Updated: 04/01/24 1905     BCID, PCR Enterococcus faecalis. Farhad/B (vancomycin resistance gene) not detected. Identification by BCID2 PCR.     BOTTLE TYPE Anaerobic Bottle    Narrative:      Infectious disease consultation is highly recommended to rule out distant foci of infection.    Respiratory Panel PCR w/COVID-19(SARS-CoV-2) KAJAL/MARILIN/DEBBIE/PAD/COR/DAVID In-House, NP Swab in UTM/VTM, 2 HR TAT - Swab, Nasopharynx [[id number]]  (Normal) Collected: 03/31/24 2222    Lab Status: Final result Specimen: Swab from Nasopharynx Updated: 03/31/24 2321     ADENOVIRUS, PCR Not Detected     Coronavirus 229E Not Detected     Coronavirus HKU1 Not Detected     Coronavirus NL63 Not Detected     Coronavirus OC43 Not Detected     COVID19 Not Detected     Human Metapneumovirus Not Detected     Human Rhinovirus/Enterovirus Not Detected     Influenza A PCR Not Detected     Influenza B PCR Not Detected     Parainfluenza Virus 1 Not Detected     Parainfluenza Virus 2 Not Detected     Parainfluenza Virus 3 Not Detected     Parainfluenza Virus 4 Not Detected     RSV, PCR Not Detected     Bordetella pertussis pcr Not Detected     Bordetella parapertussis PCR Not Detected     Chlamydophila pneumoniae PCR Not Detected     Mycoplasma pneumo by PCR Not Detected    Narrative:      In the setting of a positive respiratory panel with a viral infection PLUS a negative procalcitonin without other underlying concern for bacterial infection, consider observing off antibiotics or discontinuation of antibiotics and continue supportive care. If the respiratory panel is positive for atypical bacterial infection (Bordetella pertussis, Chlamydophila pneumoniae, or Mycoplasma pneumoniae), consider antibiotic de-escalation to target atypical bacterial infection.                amLODIPine,  10 mg, Oral, Daily  ampicillin, 2 g, Intravenous, Q4H  apixaban, 5 mg, Oral, Q12H  atorvastatin, 40 mg, Oral, Nightly  budesonide, 0.5 mg, Nebulization, BID - RT  carvedilol, 6.25 mg, Oral, BID With Meals  cefTRIAXone, 2,000 mg, Intravenous, Q12H  cetirizine, 10 mg, Oral, Daily  cyclobenzaprine, 10 mg, Oral, Nightly  furosemide, 40 mg, Oral, BID  ipratropium-albuterol, 3 mL, Nebulization, 4x Daily - RT  lisinopril, 40 mg, Oral, Daily  multivitamin with minerals, 1 tablet, Oral, Daily  pantoprazole, 40 mg, Oral, Q AM  potassium chloride, 20 mEq, Oral, Daily  potassium chloride ER, 40 mEq, Oral, Q4H  roflumilast, 500 mcg, Oral, Daily  rOPINIRole, 2 mg, Oral, Nightly  sertraline, 100 mg, Oral, Daily  sodium chloride, 10 mL, Intravenous, Q12H      O2, 2 L/min, Last Rate: 2 L/min (04/02/24 0659)        Diagnostics:  FL ESOPHAGRAM SINGLE CONTRAST    Result Date: 4/1/2024  ESOPHAGRAM  HISTORY: Recurrent pneumonia. Some dysphagia and reflux symptoms.  FINDINGS: An initial PA view of the chest was obtained. The lungs are well expanded with some scattered chronic interstitial changes and scarring, particularly in the upper lobes that is similar to the study of 06/09/2016. The heart is top normal in size with sternal wires from previous cardiac surgery.  The patient swallowed sips of barium without difficulty. There is normal distensibility of the hypopharynx and cervical esophagus. The remainder of the esophagus is also normal in appearance. There is no hiatus hernia. No reflux occurs during the water siphon test.  CONCLUSION: Negative esophagram. 95 images were obtained and the fluoroscopy time measures 30 seconds. The dose Kerma measures 5 mGy.  This report was finalized on 4/1/2024 4:05 PM by Dr. Isaías Garza M.D on Workstation: [id number]      XR Chest 1 View    Result Date: 3/31/2024  SINGLE VIEW OF THE CHEST  HISTORY: Shortness of breath  COMPARISON: March 23, 2024  FINDINGS: There is cardiomegaly. Patient is status  post median sternotomy. There are background emphysematous changes. When compared to prior study, patient appears to have some patchy infiltrate within the right midlung. There may be some additional mild consolidation at the left lung base. No pneumothorax or large effusion is seen.      Patchy infiltrate suspected within the right midlung. There may be some additional involvement at the left lung base.  This report was finalized on 3/31/2024 10:29 PM by Dr. Leigh Parr M.D on Workstation: [id number]      XR Chest 1 View    Result Date: 3/23/2024  XR CHEST 1 VW-  HISTORY: Female who is 68 years-old, short of breath  TECHNIQUE: Frontal view of the chest  COMPARISON: 3/2/2024  FINDINGS: The heart size is borderline. Sternotomy wires are present. Pulmonary vasculature is unremarkable. Aorta is calcified. No focal pulmonary consolidation, pleural effusion, or pneumothorax. No acute osseous process.      No focal pulmonary consolidation. Borderline heart size. Follow-up as clinical indications persist.  This report was finalized on 3/23/2024 7:33 AM by Dr. Chapincito Garcia M.D on Workstation: [id number]      Adult Transthoracic Echo Complete w/ Color, Spectral and Contrast if Necessary Per Protocol    Result Date: 3/3/2024    Left ventricular systolic function is hyperdynamic (EF > 70%). Calculated left ventricular EF = 73.7%   Left ventricular wall thickness is consistent with concentric hypertrophy.   Left ventricular diastolic function was indeterminate.   There is a bioprosthetic mitral valve present. There is a moderate, non-mobile mass on the posterior leaflet(s) of the prosthetic mitral valve that is consistent with a vegetation.   Mild aortic valve stenosis is present. Aortic valve area is 1.8 cm2.   Aortic valve maximum pressure gradient is 15 mmHg. Aortic valve mean pressure gradient is 9 mmHg.   Moderate mitral valve stenosis is present. The mitral valve peak gradient is 21 mmHg. The mitral valve  mean gradient is 9 mmHg.   Mild mitral valve regurgitation is present.   Mild tricuspid valve regurgitation is present.   Estimated right ventricular systolic pressure from tricuspid regurgitation is mildly elevated (35-45 mmHg). Calculated right ventricular systolic pressure from tricuspid regurgitation is 41 mmHg.     Results for orders placed during the hospital encounter of 03/31/24    Adult Transesophageal Echo (CHIO) W/ Cont if Necessary Per Protocol    Interpretation Summary    Left ventricular systolic function is normal. Left ventricular ejection fraction appears to be 61 - 65%.    Left ventricular diastolic function was indeterminate.    The right ventricular cavity is mildly dilated. Normal right ventricular systolic function noted.    The left atrial cavity is mildly dilated.    The left atrial appendage has been previously ligated    There is a bioprosthetic mitral valve replacement which appears to be well-seated. The bioprosthetic mitral valve leaflets are thickened.    There is a calcified nonmobile echodensity attached to the posterior leaflet of the bioprosthetic mitral valve replacement. This appears to be a calcified remnant of a prior vegetation. There are no obvious acute or mobile vegetations.    Gradients across the bioprosthetic mitral valve replacement are elevated with a peak gradient of 30 mmHg and a mean gradient of 11 mmHg (although the leaflets appear to open well). There is mild mitral regurgitation    There is an annuloplasty ring in the tricuspid position. There is moderate tricuspid regurgitation through the annuloplasty ring.    Calculated right ventricular systolic pressure from tricuspid regurgitation is 76 mmHg    There are moderate plaques in the descending thoracic aorta. There are mild plaques in the aortic arch    There is no evidence of pericardial effusion.          Active Hospital Problems    Diagnosis  POA    **Pneumonia [J18.9]  Yes    Septicemia due to enterococcus  [A41.81]  Yes    Acute-on-chronic respiratory failure [J96.20]  Yes    COPD exacerbation [J44.1]  Yes    Chronic diastolic CHF (congestive heart failure) [I50.32]  Yes    Iron deficiency anemia [D50.9]  Yes    Chronic hypoxemic respiratory failure [J96.11]  Yes    S/P TVR (tricuspid valve repair) [Z98.890]  Not Applicable    Hypertension [I10]  Yes    PAF (paroxysmal atrial fibrillation) [I48.0]  Yes      Resolved Hospital Problems   No resolved problems to display.         Assessment & Plan     Pneumonia new infiltrates her chest x-ray was pretty clear a week ago sounds like she is having recurrent episodes the respiratory pathogen panel is negative blood cultures Enterococcus faecalis.   recurrent infiltrates raises the question of aspiration esophagram was normal.  She had swallowing study and speech felt she was safe to continue regular diet and thin liquids.  The Enterococcus could come from her lung but that is not the most common source.  Continue antibiotics will defer antibiotics to infectious disease.  She will need a follow-up chest x-ray in about 4 to 6 weeks to ensure clearing of infiltrates.  Enterococcus faecalis bacteremia worry with her prosthetic valve for possible endocarditis .trans esophageal echocardiogram results pending repeat blood cultures no growth to day 3.  Looks like ID is planning a 6-week course of antibiotics I think that is probably a milan and safe plan.  COPD do not think she is in an acute exacerbation do not think systemic steroids are indicated at this time continue bronchodilators  Chronic respiratory failure with hypoxia  now onbaseline O2 and doing well  Pulmonary hypertension by echocardiogram mild to moderate  Valvular heart disease prior mitral valve replacement with tissue valve and tricuspid valve repair  Paroxysmal A-fib status post Maze procedure  Hypertension  Hyperlipidemia  Hiatal hernia   Anemia looks like may be a combination of iron deficiency and chronic  disease    Plan for disposition: From pulmonary standpoint she can go at any time I will be glad to see her in the office in follow-up in 4 to 6 weeks and we can do a chest x-ray at that time.  She will resume her home bronchodilator and O2 regimen and will see as needed    Ag Melo Jr, MD  04/05/24  11:34 EDT    Time:

## 2024-04-05 NOTE — PROGRESS NOTES
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  No fever.  She is feeling better overall.  She had some loose stools yesterday and a C. difficile test was sent but it was negative.  She is tolerating ampicillin and ceftriaxone without rash.  She is eager for discharge soon.    Medications:    Current Facility-Administered Medications:     acetaminophen (TYLENOL) tablet 650 mg, 650 mg, Oral, Q4H PRN **OR** acetaminophen (TYLENOL) 160 MG/5ML oral solution 650 mg, 650 mg, Oral, Q4H PRN **OR** acetaminophen (TYLENOL) suppository 650 mg, 650 mg, Rectal, Q4H PRN, Rashmi Moran APRN    albuterol (PROVENTIL) nebulizer solution 0.083% 2.5 mg/3mL, 2.5 mg, Nebulization, Q6H PRN, Hanna Narvaez APRN, 2.5 mg at 04/05/24 0131    amLODIPine (NORVASC) tablet 10 mg, 10 mg, Oral, Daily, Gil Nicole MD, 10 mg at 04/05/24 0821    ampicillin 2000 mg IVPB in 100 mL NS (MBP), 2 g, Intravenous, Q4H, Loy Clayton MD, Last Rate: 200 mL/hr at 04/05/24 0822, 2 g at 04/05/24 0822    apixaban (ELIQUIS) tablet 5 mg, 5 mg, Oral, Q12H, Gil Nicole MD, 5 mg at 04/05/24 0821    atorvastatin (LIPITOR) tablet 40 mg, 40 mg, Oral, Nightly, Gil Nicole MD, 40 mg at 04/04/24 2025    sennosides-docusate (PERICOLACE) 8.6-50 MG per tablet 2 tablet, 2 tablet, Oral, BID PRN **AND** [DISCONTINUED] polyethylene glycol (MIRALAX) packet 17 g, 17 g, Oral, Daily PRN **AND** bisacodyl (DULCOLAX) EC tablet 5 mg, 5 mg, Oral, Daily PRN **AND** bisacodyl (DULCOLAX) suppository 10 mg, 10 mg, Rectal, Daily PRN, Rashmi Moran APRN    budesonide (PULMICORT) nebulizer solution 0.5 mg, 0.5 mg, Nebulization, BID - RT, Gil Nicole MD, 0.5 mg at 04/05/24 0740    Calcium Replacement - Follow Nurse / BPA Driven Protocol, , Does not apply, PRN, Gil Nicole MD    carvedilol (COREG)  tablet 6.25 mg, 6.25 mg, Oral, BID With Meals, Gil Nicole MD, 6.25 mg at 04/05/24 0821    cefTRIAXone (ROCEPHIN) 2,000 mg in sodium chloride 0.9 % 100 mL MBP, 2,000 mg, Intravenous, Q12H, Rashmi Moran APRN, Last Rate: 200 mL/hr at 04/04/24 2137, 2,000 mg at 04/04/24 2137    cetirizine (zyrTEC) tablet 10 mg, 10 mg, Oral, Daily, Gil Nicole MD, 10 mg at 04/05/24 0821    cyclobenzaprine (FLEXERIL) tablet 10 mg, 10 mg, Oral, Nightly, Gil Nicole MD, 10 mg at 04/04/24 2025    furosemide (LASIX) tablet 40 mg, 40 mg, Oral, BID, Gil Nicole MD, 40 mg at 04/05/24 0821    HYDROcodone-acetaminophen (NORCO) 7.5-325 MG per tablet 1 tablet, 1 tablet, Oral, Q6H PRN, Nick Tovar MD, 1 tablet at 04/05/24 0457    ipratropium-albuterol (DUO-NEB) nebulizer solution 3 mL, 3 mL, Nebulization, 4x Daily - RT, Rashmi Moran, APRN, 3 mL at 04/05/24 0740    lisinopril (PRINIVIL,ZESTRIL) tablet 40 mg, 40 mg, Oral, Daily, Gil Nicole MD, 40 mg at 04/05/24 0821    loperamide (IMODIUM) capsule 2 mg, 2 mg, Oral, 4x Daily PRN, Gil Nicole MD    Magnesium Low Dose Replacement - Follow Nurse / BPA Driven Protocol, , Does not apply, PRN, Gil Nicole MD    multivitamin with minerals 1 tablet, 1 tablet, Oral, Daily, Gil Nicole MD, 1 tablet at 04/05/24 0821    nitroglycerin (NITROSTAT) SL tablet 0.4 mg, 0.4 mg, Sublingual, Q5 Min PRN, Rashmi Moran APRN    O2 (OXYGEN), 2 L/min, Inhalation, Continuous, Gil Nicole MD, Last Rate: 120,000 mL/hr at 04/02/24 0659, 2 L/min at 04/02/24 0659    ondansetron (ZOFRAN) injection 4 mg, 4 mg, Intravenous, Q6H PRN, Rashmi Moran APRN, 4 mg at 04/04/24 0959    pantoprazole (PROTONIX) EC tablet 40 mg, 40 mg, Oral, Q AM, Gil Nicole MD, 40 mg at 04/05/24 0500    Phosphorus Replacement - Follow Nurse / BPA Driven Protocol, , Does not apply, PRN, Gil Nicole MD    polyethylene glycol  (MIRALAX) packet 17 g, 17 g, Oral, BID PRN, Gil Nicole MD    potassium chloride (K-DUR,KLOR-CON) ER tablet 20 mEq, 20 mEq, Oral, Daily, Gil Nicole MD, 20 mEq at 04/05/24 0821    Potassium Replacement - Follow Nurse / BPA Driven Protocol, , Does not apply, PRN, Gil Nicole MD    roflumilast (DALIRESP) tablet 500 mcg, 500 mcg, Oral, Daily, Gil Nicole MD, 500 mcg at 04/05/24 0821    rOPINIRole (REQUIP) tablet 2 mg, 2 mg, Oral, Nightly, Gil Nicole MD, 2 mg at 04/04/24 2025    sertraline (ZOLOFT) tablet 100 mg, 100 mg, Oral, Daily, Gil Nicole MD, 100 mg at 04/05/24 0821    sodium chloride 0.9 % flush 10 mL, 10 mL, Intravenous, PRN, Gil Nicole MD    sodium chloride 0.9 % flush 10 mL, 10 mL, Intravenous, Q12H, Rashmi Moran, APRN, 10 mL at 04/05/24 0830    sodium chloride 0.9 % flush 10 mL, 10 mL, Intravenous, PRN, Rashmi Moran, APRN    sodium chloride 0.9 % infusion 40 mL, 40 mL, Intravenous, PRN, Rashmi Moran, APRN    zolpidem (AMBIEN) tablet 10 mg, 10 mg, Oral, Nightly PRN, Gil Nicole MD, 10 mg at 04/03/24 2011      Objective   Vital Signs   Temp:  [98.1 °F (36.7 °C)-99.1 °F (37.3 °C)] 98.1 °F (36.7 °C)  Heart Rate:  [] 103  Resp:  [18-22] 22  BP: ()/(60-91) 151/67    Physical Exam:   General: Awake, alert, sitting up in chair  Cardiovascular: Tachycardic  Respiratory: normal work of breathing without wheezing  GI: Abdomen is not distended or tender  :  no Vargas catheter    Labs:   CBC, BMP, and blood cultures reviewed today  Lab Results   Component Value Date    WBC 11.32 (H) 04/05/2024    HGB 7.4 (L) 04/05/2024    HCT 22.8 (L) 04/05/2024    MCV 81.7 04/05/2024     04/05/2024     Lab Results   Component Value Date    GLUCOSE 124 (H) 04/05/2024    CALCIUM 8.6 04/05/2024     04/05/2024    K 3.3 (L) 04/05/2024    CO2 30.4 (H) 04/05/2024     04/05/2024    BUN 8 04/05/2024    CREATININE 0.77  04/05/2024    EGFRRESULT 50.6 (L) 04/11/2023    EGFR 83.1 04/05/2024    BCR 10.4 04/05/2024    ANIONGAP 10.6 04/05/2024     Procalcitonin 0.1    Microbiology:  3/31 RPP: Negative  3/31 BCx: Enterococcus faecalis (not VRE) in 2/2 sets (susceptible to ampicillin)  4/1 MRSA nares PCR: Negative  4/2 blood cultures: Negative to date    Radiology:  4/3 TTE discussed with cardiologist: Known small, echodense, mobile mass on the posterior leaflet of the prosthetic mitral valve.  No significant changes of mass compared to prior echo last month    4/4 CHIO showed the known calcified vegetation on the prosthetic mitral valve      ASSESSMENT/PLAN:  Enterococcus septicemia  Mitral valve replaced  History of tricuspid valve repair  Penicillin allergy -tolerating ampicillin  COPD due to history of tobacco abuse  Pulmonary hypertension  Paroxysmal atrial fibrillation  Hiatal hernia  Anemia    TTE showed a stable, known, small, echodense, mobile mass on the posterior leaflet of the prosthetic mitral valve.  CHIO shows the same.  Neither of these showed any new, concerning findings.  However, with prosthetic mitral valve in place, Enterococcus bacteremia, and history of prosthetic mitral valve endocarditis, I think the benefits outweigh the risks in terms of treating her with a 6 weeks course of antibiotics.     While in the hospital, continue ampicillin 2 g IV every 4 hours and ceftriaxone 2 g IV every 12 hours.      When ready for discharge, ampicillin dose will be 12 g IV every 24 hours by continuous infusion.  The ceftriaxone dosing will stay the same at 2 g IV every 12 hours. Stop date 5/13/2024.  Weekly CBC with differential and BMP faxed to me at [phone].  Follow-up with me will be arranged on 5/13/2024 at 1:30 PM.  I will order double-lumen PICC line today.    Thank you for allowing me to be involved in the care of this patient. Infectious diseases will sign off at this time with antibiotics plan in place, but please call me  at [phone] if any further ID questions or new ID concerns.

## 2024-04-05 NOTE — PLAN OF CARE
Goal Outcome Evaluation:  Plan of Care Reviewed With: patient        Progress: no change  Outcome Evaluation: Pt was seen by PT this AM for tx. Pt was UIC and stood w/ SBA. Pt's sats were at 94% at beg of session w/ pt on 3L throughout session. Pt amb 40' then requested to return and sit at EOB. Sats were at 84% w/ pt returned to bed w/ min A for B LE as she performed PLB. Pt's sats were at 90% after approx 4 min. Notified RN of pt request for breathing tx. Next session 8/8.      Anticipated Discharge Disposition (PT): home with home health, home with assist

## 2024-04-05 NOTE — PLAN OF CARE
Goal Outcome Evaluation:  Plan of Care Reviewed With: patient         Patient is resting in bed, no acute distress noted, no c/o voiced. Will continue to monitor

## 2024-04-06 ENCOUNTER — READMISSION MANAGEMENT (OUTPATIENT)
Dept: CALL CENTER | Facility: HOSPITAL | Age: 69
End: 2024-04-06
Payer: MEDICARE

## 2024-04-06 VITALS
DIASTOLIC BLOOD PRESSURE: 70 MMHG | BODY MASS INDEX: 20.4 KG/M2 | OXYGEN SATURATION: 92 % | TEMPERATURE: 98.4 F | SYSTOLIC BLOOD PRESSURE: 151 MMHG | RESPIRATION RATE: 18 BRPM | HEART RATE: 111 BPM | WEIGHT: 130 LBS | HEIGHT: 67 IN

## 2024-04-06 LAB
ALBUMIN SERPL-MCNC: 2.9 G/DL (ref 3.5–5.2)
ALBUMIN/GLOB SERPL: 1 G/DL
ALP SERPL-CCNC: 72 U/L (ref 39–117)
ALT SERPL W P-5'-P-CCNC: 13 U/L (ref 1–33)
ANION GAP SERPL CALCULATED.3IONS-SCNC: 7.3 MMOL/L (ref 5–15)
AST SERPL-CCNC: 18 U/L (ref 1–32)
BILIRUB SERPL-MCNC: 0.4 MG/DL (ref 0–1.2)
BUN SERPL-MCNC: 6 MG/DL (ref 8–23)
BUN/CREAT SERPL: 8.2 (ref 7–25)
CALCIUM SPEC-SCNC: 8.6 MG/DL (ref 8.6–10.5)
CHLORIDE SERPL-SCNC: 104 MMOL/L (ref 98–107)
CO2 SERPL-SCNC: 32.7 MMOL/L (ref 22–29)
CREAT SERPL-MCNC: 0.73 MG/DL (ref 0.57–1)
EGFRCR SERPLBLD CKD-EPI 2021: 88.6 ML/MIN/1.73
GLOBULIN UR ELPH-MCNC: 2.8 GM/DL
GLUCOSE SERPL-MCNC: 113 MG/DL (ref 65–99)
MAGNESIUM SERPL-MCNC: 2 MG/DL (ref 1.6–2.4)
PHOSPHATE SERPL-MCNC: 3 MG/DL (ref 2.5–4.5)
POTASSIUM SERPL-SCNC: 4 MMOL/L (ref 3.5–5.2)
PROT SERPL-MCNC: 5.7 G/DL (ref 6–8.5)
SODIUM SERPL-SCNC: 144 MMOL/L (ref 136–145)

## 2024-04-06 PROCEDURE — 94664 DEMO&/EVAL PT USE INHALER: CPT

## 2024-04-06 PROCEDURE — 94761 N-INVAS EAR/PLS OXIMETRY MLT: CPT

## 2024-04-06 PROCEDURE — 25010000002 AMPICILLIN PER 500 MG: Performed by: INTERNAL MEDICINE

## 2024-04-06 PROCEDURE — 25010000002 CEFTRIAXONE PER 250 MG: Performed by: INTERNAL MEDICINE

## 2024-04-06 PROCEDURE — 94799 UNLISTED PULMONARY SVC/PX: CPT

## 2024-04-06 PROCEDURE — 25010000002 ONDANSETRON PER 1 MG: Performed by: NURSE PRACTITIONER

## 2024-04-06 PROCEDURE — 84100 ASSAY OF PHOSPHORUS: CPT | Performed by: HOSPITALIST

## 2024-04-06 PROCEDURE — 83735 ASSAY OF MAGNESIUM: CPT | Performed by: HOSPITALIST

## 2024-04-06 PROCEDURE — 80053 COMPREHEN METABOLIC PANEL: CPT | Performed by: HOSPITALIST

## 2024-04-06 RX ORDER — HYDROCODONE BITARTRATE AND ACETAMINOPHEN 7.5; 325 MG/1; MG/1
1 TABLET ORAL EVERY 6 HOURS PRN
Status: DISCONTINUED | OUTPATIENT
Start: 2024-04-06 | End: 2024-04-06 | Stop reason: HOSPADM

## 2024-04-06 RX ORDER — LOPERAMIDE HYDROCHLORIDE 2 MG/1
2 CAPSULE ORAL 4 TIMES DAILY PRN
Qty: 20 CAPSULE | Refills: 0 | Status: SHIPPED | OUTPATIENT
Start: 2024-04-06

## 2024-04-06 RX ADMIN — PANTOPRAZOLE SODIUM 40 MG: 40 TABLET, DELAYED RELEASE ORAL at 05:04

## 2024-04-06 RX ADMIN — CARVEDILOL 6.25 MG: 6.25 TABLET, FILM COATED ORAL at 09:52

## 2024-04-06 RX ADMIN — ACETAMINOPHEN 325MG 650 MG: 325 TABLET ORAL at 08:40

## 2024-04-06 RX ADMIN — AMPICILLIN SODIUM 2 G: 2 INJECTION, POWDER, FOR SOLUTION INTRAMUSCULAR; INTRAVENOUS at 04:19

## 2024-04-06 RX ADMIN — HYDROCODONE BITARTRATE AND ACETAMINOPHEN 1 TABLET: 7.5; 325 TABLET ORAL at 00:09

## 2024-04-06 RX ADMIN — IPRATROPIUM BROMIDE AND ALBUTEROL SULFATE 3 ML: .5; 3 SOLUTION RESPIRATORY (INHALATION) at 07:19

## 2024-04-06 RX ADMIN — AMPICILLIN SODIUM 2 G: 2 INJECTION, POWDER, FOR SOLUTION INTRAMUSCULAR; INTRAVENOUS at 08:48

## 2024-04-06 RX ADMIN — Medication 10 ML: at 10:47

## 2024-04-06 RX ADMIN — IPRATROPIUM BROMIDE AND ALBUTEROL SULFATE 3 ML: .5; 3 SOLUTION RESPIRATORY (INHALATION) at 15:28

## 2024-04-06 RX ADMIN — CEFTRIAXONE 2000 MG: 2 INJECTION, POWDER, FOR SOLUTION INTRAMUSCULAR; INTRAVENOUS at 11:27

## 2024-04-06 RX ADMIN — ROFLUMILAST 500 MCG: 500 TABLET ORAL at 10:44

## 2024-04-06 RX ADMIN — Medication 10 ML: at 00:14

## 2024-04-06 RX ADMIN — APIXABAN 5 MG: 5 TABLET, FILM COATED ORAL at 10:44

## 2024-04-06 RX ADMIN — CEFTRIAXONE 2000 MG: 2 INJECTION, POWDER, FOR SOLUTION INTRAMUSCULAR; INTRAVENOUS at 00:13

## 2024-04-06 RX ADMIN — ZOLPIDEM TARTRATE 10 MG: 5 TABLET ORAL at 00:09

## 2024-04-06 RX ADMIN — IPRATROPIUM BROMIDE AND ALBUTEROL SULFATE 3 ML: .5; 3 SOLUTION RESPIRATORY (INHALATION) at 11:23

## 2024-04-06 RX ADMIN — BUDESONIDE 0.5 MG: 0.5 INHALANT RESPIRATORY (INHALATION) at 07:20

## 2024-04-06 RX ADMIN — LISINOPRIL 40 MG: 40 TABLET ORAL at 10:43

## 2024-04-06 RX ADMIN — AMLODIPINE BESYLATE 10 MG: 10 TABLET ORAL at 10:43

## 2024-04-06 RX ADMIN — FUROSEMIDE 40 MG: 40 TABLET ORAL at 10:43

## 2024-04-06 RX ADMIN — CETIRIZINE HYDROCHLORIDE 10 MG: 10 TABLET ORAL at 10:43

## 2024-04-06 RX ADMIN — ONDANSETRON HYDROCHLORIDE 4 MG: 2 INJECTION, SOLUTION INTRAMUSCULAR; INTRAVENOUS at 05:40

## 2024-04-06 RX ADMIN — SERTRALINE 100 MG: 100 TABLET, FILM COATED ORAL at 10:43

## 2024-04-06 RX ADMIN — ALBUTEROL SULFATE 2.5 MG: 2.5 SOLUTION RESPIRATORY (INHALATION) at 05:08

## 2024-04-06 RX ADMIN — AMPICILLIN SODIUM 2 G: 2 INJECTION, POWDER, FOR SOLUTION INTRAMUSCULAR; INTRAVENOUS at 00:11

## 2024-04-06 RX ADMIN — Medication 1 TABLET: at 10:44

## 2024-04-06 RX ADMIN — AMPICILLIN SODIUM 2 G: 2 INJECTION, POWDER, FOR SOLUTION INTRAMUSCULAR; INTRAVENOUS at 12:02

## 2024-04-06 RX ADMIN — POTASSIUM CHLORIDE 20 MEQ: 750 TABLET, EXTENDED RELEASE ORAL at 10:43

## 2024-04-06 NOTE — PLAN OF CARE
Goal Outcome Evaluation:  Plan of Care Reviewed With: patient, spouse         Pt getting d/c. Waiting for her daughter to come.  at bedside.

## 2024-04-06 NOTE — NURSING NOTE
Most recent chest xray confirms PICC tip is an appropriate location and line is safe to use. Primary RN notified

## 2024-04-06 NOTE — PLAN OF CARE
Goal Outcome Evaluation:  Plan of Care Reviewed With: patient, spouse        Progress: improving  Outcome Evaluation: Patients o2 dropped when patient asleep, Patients heart rate elevated to 150's at times this shift but goes right back down to low 100's, all other vitals stable. Patient did recieve PRN breathiing treatment this shift. Patient reports pain and trouble sleeping and PRN medication given. Patient tolerating IV antibiotics without difficulty. Patients PICC line intact with good blood return and nurse collected labs without difficulty. Patients spouse at bedside. Will continue to monitor.

## 2024-04-06 NOTE — SIGNIFICANT NOTE
"   04/05/24 2015   PICC Single Lumen 04/05/24 Left Basilic   Placement date: If unknown, DO NOT use \"Add Comment\" note/Placement time: If unknown, DO NOT use \"Add Comment\" note: 04/05/24 1800   Hand Hygiene Completed: Yes  Size (Fr): 5  Description (optional): DL PICC  Length (cm): 45 cm  Orientation: Left  Loc...   Site Assessment Clean;Dry;Intact   #1 Lumen Status Blood return noted;Capped;Flushed;Normal saline locked   Length summer (cm) 0 cm   Line Care Connections checked and tightened   Extremity Circumference (cm) 30 cm   Dressing Type Border Dressing;Transparent;Securing device;Antimicrobial dressing/disc   Dressing Status Clean;Dry;Intact   Dressing Intervention New dressing   Liquid Adhesive Applied   Dressing Change Due 04/12/24   Indication/Daily Review of Necessity long-term IV access >7 days       PICC placement attempted on right extremity first. Could only advance the PICC line in 25cm before meeting resistance/obstruction. Unable to thread. First attempt unsuccessful.     Second PICC attempt was successful. PICC placed in left basilic vein. D/T pt being in AFIB - chest xray required to confirm line placement.     Primary RN, Briseyda, aware that line cannot be used until xray confirms PICC tip terminates in the SVC.     LOT - [id number]  EX - 11/30/24    FINAL COUNT - 3 NEEDLES, 2 GUIDEWIRES, 1 SCALPEL ACCOUNTED FOR AT PROCEDURE END   "

## 2024-04-06 NOTE — DISCHARGE SUMMARY
PHYSICIAN DISCHARGE SUMMARY                                                                        [facility]    Patient Identification:  Name: Paz Browne  Age: 68 y.o.  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    Possible SBE: See discussion above. Ampicillin 12 g IV every 24 hours by continuous infusion.  Ceftriaxone 2 g IV every 12 hours. Stop date 2024.  Pulmonary infiltrate/pneumonia: Pulmonary input greatly appreciated.  Should be covered by the above antibiotics.  Aspiration evaluation unremarkable.  COPD exacerbation:   Chronic hypoxic respiratory failure: .  Paroxysmal atrial fibrillation:   Hypertension:   Anemia of chronic disease/iron deficiency anemia: .  Hypokalemia:    Severe pulmonary hypertension:  Diarrhea: Antibiotic associated.  No clinical evidence of C. difficile.  As needed Imodium.  Hold MiraLAX.  Hold magnesium supplements.    Hospital Course:  Pleasant 68-year-old female with a history of continuous COPD with chronic hypoxic respiratory failure as well as a previous history of SBE on a bioprosthetic mitral valve.  She presented to the emergency room with recurrent bouts of shortness of air.  Please H&P for full details.  On chest x-ray there is evidence of a subtle infiltrate.  She was admitted, cultured and started on IV antibiotics and continue with aggressive bronchodilators.  Her pulmonologist was consulted on the case.  Blood cultures returned with 2 out of 2 cultures positive for Enterococcus.  Infectious ease cardiology consultation were both  obtained.  She has a history of a residual calcified nodule on the mitral valve from her previous history of SBE.  Antibiotics were switched over to combination of ampicillin and ceftriaxone.  Repeat blood cultures were negative.  Repeat transthoracic and echocardiogram showed no changes in the mitral valve.  It would be exceedingly difficult however to rule out SBE in this setting and she will undergo full treatment thereof.  She did develop diarrhea which appears to be antibiotic associated.  She was on daily MiraLAX on presentation and this should be put on hold for the time being.  Also put her magnesium on hold as this can add to loose stools.  She has remained afebrile vital signs stable and her COPD status is at baseline.  PICC line has been placed and arrangements have been made for outpatient treatment for a long term antibiotics.    Consults:     Consults       Date and Time Order Name Status Description    4/3/2024 11:09 AM Inpatient Cardiology Consult Completed     4/2/2024  8:44 AM Inpatient Infectious Diseases Consult Completed     4/1/2024  9:30 AM Inpatient Pulmonology Consult      4/1/2024  1:24 AM [facility] (on-call MD unless specified) Details      3/4/2024 11:32 AM Inpatient Infectious Diseases Consult Completed     3/3/2024  3:49 PM Inpatient Pulmonology Consult Completed     3/3/2024  3:49 PM Inpatient Cardiology Consult Completed               Discharge Exam:  Afebrile vital signs stable.  Well-developed well-nourished female no apparent distress.  Lungs with scattered end expiratory wheezes.  Heart irregular with good rate control.  Extremities with no clubbing cyanosis or edema.  Alert oriented conversant cooperative and pleasant.     Disposition:  Home    Patient Instructions:      Discharge Medications        New Medications        Instructions Start Date   ampicillin 2 g in sodium chloride 0.9 % 100 mL IVPB   2 g, Intravenous, Every 4 Hours      cefTRIAXone 2,000 mg in sodium chloride 0.9 % 100  mL IVPB   2,000 mg, Intravenous, Every 12 Hours      loperamide 2 MG capsule  Commonly known as: IMODIUM   2 mg, Oral, 4 Times Daily PRN             Continue These Medications        Instructions Start Date   albuterol sulfate  (90 Base) MCG/ACT inhaler  Commonly known as: PROVENTIL HFA;VENTOLIN HFA;PROAIR HFA   2 puffs, Inhalation, Every 4 Hours PRN      amLODIPine 10 MG tablet  Commonly known as: NORVASC   TAKE 1 TABLET BY MOUTH EVERY DAY      apixaban 5 MG tablet tablet  Commonly known as: ELIQUIS   5 mg, Oral, Every 12 Hours Scheduled      atorvastatin 40 MG tablet  Commonly known as: LIPITOR   TAKE 1 TABLET BY MOUTH EVERY DAY      budesonide 0.5 MG/2ML nebulizer solution  Commonly known as: PULMICORT   0.5 mg, Nebulization, 2 Times Daily - RT      carvedilol 6.25 MG tablet  Commonly known as: COREG   TAKE 1 TABLET BY MOUTH TWICE A DAY WITH MEALS      cyclobenzaprine 10 MG tablet  Commonly known as: FLEXERIL   TAKE 1 TABLET BY MOUTH EVERYDAY AT BEDTIME      docosanol 10 % cream cream  Commonly known as: ABREVA   1 Application, Topical, 5 Times Daily      furosemide 40 MG tablet  Commonly known as: LASIX   TAKE 1 TABLET BY MOUTH TWICE A DAY      HYDROcodone-acetaminophen 7.5-325 MG per tablet  Commonly known as: NORCO   1 tablet, Oral, Every 6 Hours PRN      ipratropium-albuterol 0.5-2.5 mg/3 ml nebulizer  Commonly known as: DUO-NEB   3 mL, Nebulization, Every 4 Hours PRN      lisinopril 40 MG tablet  Commonly known as: PRINIVIL,ZESTRIL   40 mg, Oral, Daily      loratadine 10 MG tablet  Commonly known as: CLARITIN   10 mg, Oral, 2 times daily      Multivital tablet  Generic drug: multivitamin with minerals   1 tablet, Oral, Daily      Nebulizer device   1 Units, Does not apply, 4 Times Daily PRN, J44.1 j20.8      O2  Commonly known as: OXYGEN   2 L/min, Inhalation, Continuous      omeprazole 40 MG capsule  Commonly known as: priLOSEC   TAKE 1 CAPSULE BY MOUTH EVERY DAY      ondansetron ODT 4 MG  disintegrating tablet  Commonly known as: ZOFRAN-ODT   4 mg, Translingual, Every 8 Hours PRN      potassium chloride 20 MEQ CR tablet  Commonly known as: KLOR-CON M20   1 po tid      roflumilast 500 MCG tablet tablet  Commonly known as: DALIRESP   500 mcg, Oral, Daily      rOPINIRole 2 MG tablet  Commonly known as: REQUIP   TAKE 1 TABLET BY MOUTH EVERY DAY AT NIGHT      sertraline 100 MG tablet  Commonly known as: ZOLOFT   TAKE 1 TABLET BY MOUTH EVERY DAY      zolpidem 10 MG tablet  Commonly known as: AMBIEN   10 mg, Oral, Nightly PRN             Stop These Medications      fish oil 1000 MG capsule capsule     Magnesium Oxide -Mg Supplement 400 (240 Mg) MG tablet     polyethylene glycol packet  Commonly known as: MIRALAX            Diet Instructions       Diet: Regular/House Diet; Thin (IDDSI 0)      Discharge Diet: Regular/House Diet    Fluid Consistency: Thin (IDDSI 0)          Future Appointments   Date Time Provider Department Center   4/12/2024 12:30 PM CHAIR 7 [facility]   4/19/2024  1:30 PM [facility]   4/26/2024  1:30 PM CHAIR 7 [facility]   5/3/2024  1:30 PM CHAIR 7 [facility]   5/10/2024  1:30 PM CHAIR 7 [facility]   5/13/2024  1:30 PM Loy Clayton MD [facility] ID [facility]   7/1/2024  9:30 AM Akbar Mendez Jr., MD [facility] CD [facility]     Additional Instructions for the Follow-ups that You Need to Schedule       Discharge Follow-up with PCP   As directed       Currently Documented PCP:    Javan Martinez MD    PCP Phone Number:    [phone]     Follow Up Details: 1 week        Discharge Follow-up with Specialty: Infectious disease.  Cardiology.  Pulmonology.   As directed      Specialty: Infectious disease.  Cardiology.  Pulmonology.   Follow Up Details: As directed.        Basic Metabolic Panel    Apr 11, 2024 (Approximate)      And weekly until 5/13/2024.  Please fax results to infectious disease at [phone].     Order Comments: And weekly until 5/13/2024.  Please fax results to infectious disease at [phone].    Release to patient: Routine Release        CBC & Differential    Apr 11, 2024 (Approximate)      And weekly until 5/13/2024.  Please fax results to infectious disease at [phone].    Order Comments: And weekly until 5/13/2024.  Please fax results to infectious disease at [phone].    Manual Differential: No   Release to patient: Routine Release               Contact information for follow-up providers       Melanie Sykes, BA. Go in 6 week(s).    Specialties: Cardiology, Nurse Practitioner  Contact information:  [address]  [place] [address]  [phone]               Javan Martinez MD .    Specialty: Internal Medicine  Why: 1 week  Contact information:  [address]  [place] [id number] [phone]                       Contact information for after-discharge care       Dialysis/Infusion       Merged with [facility] .    Service: Infusion and IV Therapy  Contact information:  [address]  [place] [address]  [phone]                                 Discharge Order (From admission, onward)       Start     Ordered    04/06/24 0958  Discharge patient  Once        Expected Discharge Date: 04/06/24   Discharge Disposition: Home or Self Care   Physician of Record for Attribution - Please select from Treatment Team: GIL NICOLE [[id number]]   Review needed by CMO to determine Physician of Record: No      Question Answer Comment   Physician of Record for Attribution - Please select from Treatment Team GIL NICOLE    Review needed by CMO to determine Physician of Record No        04/06/24 1012                      Total time spent discharging patient including evaluation,post hospitalization follow up,  medication and post hospitalization instructions and education total time exceeds 30 minutes.    Signed:  Gil Nicole  MD  4/6/2024  10:12 EDT    EMR Dragon/Transcription disclaimer:   Much of this encounter note is an electronic transcription/translation of spoken language to printed text. The electronic translation of spoken language may permit erroneous, or at times, nonsensical words or phrases to be inadvertently transcribed; Although I have reviewed the note for such errors, some may still exist.

## 2024-04-06 NOTE — SIGNIFICANT NOTE
"   04/05/24 1830   PICC Double Lumen 04/05/24 Left Basilic   Placement date: If unknown, DO NOT use \"Add Comment\" note/Placement time: If unknown, DO NOT use \"Add Comment\" note: 04/05/24 1800   Hand Hygiene Completed: Yes  Size (Fr): 5  Description (optional): DL PICC  Length (cm): 0 cm  Orientation: Left  Loca...   Site Assessment Clean;Dry;Intact   #1 Lumen Status Blood return noted;Capped;Flushed;Normal saline locked   #2 Lumen Status Blood return noted;Capped;Flushed;Normal saline locked   Length summer (cm) 0 cm   Line Care Connections checked and tightened  (Xray shows PICC in atrium. Line pulled back to 6cm externally. Chest xray ordered for final confirmation)   Extremity Circumference (cm) 30 cm   Dressing Type Border Dressing;Transparent;Securing device;Antimicrobial dressing/disc   Dressing Status Clean;Dry;Intact   Dressing Intervention New dressing   Liquid Adhesive Applied   Dressing Change Due 04/12/24   Indication/Daily Review of Necessity long-term IV access >7 days     PICC placement attempted on right extremity first. Could only advance the PICC line in 25cm before meeting resistance/obstruction. Unable to thread. First attempt unsuccessful.      Second PICC attempt was successful. PICC placed in left basilic vein. D/T pt being in AFIB - chest xray required to confirm line placement.      Primary RN, Briseyda, aware that line cannot be used until xray confirms PICC tip terminates in the SVC.      LOT - [id number]  EX - 11/30/24     FINAL COUNT - 3 NEEDLES, 2 GUIDEWIRES, 1 SCALPEL ACCOUNTED FOR AT PROCEDURE END   "

## 2024-04-07 LAB
BACTERIA SPEC AEROBE CULT: NORMAL
BACTERIA SPEC AEROBE CULT: NORMAL

## 2024-04-07 NOTE — OUTREACH NOTE
Prep Survey      Flowsheet Row Responses   [facility] patient discharged from? [facility]   Is LACE score < 7 ? No   Eligibility Readm Mgmt   Discharge diagnosis PNA   Does the patient have one of the following disease processes/diagnoses(primary or secondary)? Pneumonia   Does the patient have Home health ordered? No   Is there a DME ordered? No   Comments regarding appointments [facility] for weekly PICC care/labs   Medication alerts for this patient PICC infusions   Prep survey completed? Yes            LARISSA HOOKS - Registered Nurse

## 2024-04-07 NOTE — CASE MANAGEMENT/SOCIAL WORK
Case Management Discharge Note      Final Note: Home with spouse, [facility], and [facility] for weekly PICC care/labs    Provided Post Acute Provider List?: Yes  Post Acute Provider List: Home Health    Selected Continued Care - Discharged on 4/6/2024 Admission date: 3/31/2024 - Discharge disposition: Home or Self Care      Destination    No services have been selected for the patient.                Durable Medical Equipment    No services have been selected for the patient.                Dialysis/Infusion Coordination complete.      Service Provider Selected Services Address Phone Fax Patient Preferred    [facility] [id number] [address] [phone] --              Home Medical Care    No services have been selected for the patient.                Therapy    No services have been selected for the patient.                Community Resources    No services have been selected for the patient.                Community & DME    No services have been selected for the patient.                         Final Discharge Disposition Code: 01 - home or self-care

## 2024-04-11 ENCOUNTER — APPOINTMENT (OUTPATIENT)
Dept: GENERAL RADIOLOGY | Facility: HOSPITAL | Age: 69
DRG: 871 | End: 2024-04-11
Payer: MEDICARE

## 2024-04-11 ENCOUNTER — READMISSION MANAGEMENT (OUTPATIENT)
Dept: CALL CENTER | Facility: HOSPITAL | Age: 69
End: 2024-04-11
Payer: MEDICARE

## 2024-04-11 ENCOUNTER — HOSPITAL ENCOUNTER (INPATIENT)
Facility: HOSPITAL | Age: 69
LOS: 10 days | Discharge: HOME | DRG: 871 | End: 2024-04-21
Attending: EMERGENCY MEDICINE | Admitting: INTERNAL MEDICINE
Payer: MEDICARE

## 2024-04-11 DIAGNOSIS — D64.9 ANEMIA, UNSPECIFIED TYPE: ICD-10-CM

## 2024-04-11 DIAGNOSIS — Z86.79 HISTORY OF ATRIAL FIBRILLATION: ICD-10-CM

## 2024-04-11 DIAGNOSIS — I48.91 ATRIAL FIBRILLATION WITH RVR: ICD-10-CM

## 2024-04-11 DIAGNOSIS — Z86.79 HISTORY OF ENDOCARDITIS: ICD-10-CM

## 2024-04-11 DIAGNOSIS — Z87.09 HISTORY OF COPD: ICD-10-CM

## 2024-04-11 DIAGNOSIS — D50.9 IRON DEFICIENCY ANEMIA, UNSPECIFIED IRON DEFICIENCY ANEMIA TYPE: ICD-10-CM

## 2024-04-11 DIAGNOSIS — A41.9 SEPSIS, DUE TO UNSPECIFIED ORGANISM, UNSPECIFIED WHETHER ACUTE ORGAN DYSFUNCTION PRESENT: Primary | ICD-10-CM

## 2024-04-11 DIAGNOSIS — R09.02 HYPOXIA: ICD-10-CM

## 2024-04-11 DIAGNOSIS — I50.9 ACUTE ON CHRONIC CONGESTIVE HEART FAILURE, UNSPECIFIED HEART FAILURE TYPE: ICD-10-CM

## 2024-04-11 DIAGNOSIS — Z79.01 CHRONIC ANTICOAGULATION: ICD-10-CM

## 2024-04-11 LAB
ANION GAP SERPL CALCULATED.3IONS-SCNC: 12 MMOL/L (ref 5–15)
B PARAPERT DNA SPEC QL NAA+PROBE: NOT DETECTED
B PERT DNA SPEC QL NAA+PROBE: NOT DETECTED
BASOPHILS # BLD AUTO: 0.04 10*3/MM3 (ref 0–0.2)
BASOPHILS NFR BLD AUTO: 0.3 % (ref 0–1.5)
BUN SERPL-MCNC: 9 MG/DL (ref 8–23)
BUN/CREAT SERPL: 9.8 (ref 7–25)
C PNEUM DNA NPH QL NAA+NON-PROBE: NOT DETECTED
CALCIUM SPEC-SCNC: 8.2 MG/DL (ref 8.6–10.5)
CHLORIDE SERPL-SCNC: 99 MMOL/L (ref 98–107)
CO2 SERPL-SCNC: 33 MMOL/L (ref 22–29)
CREAT SERPL-MCNC: 0.92 MG/DL (ref 0.57–1)
D-LACTATE SERPL-SCNC: 0.9 MMOL/L (ref 0.5–2)
DEPRECATED RDW RBC AUTO: 51.1 FL (ref 37–54)
EGFRCR SERPLBLD CKD-EPI 2021: 67.1 ML/MIN/1.73
EOSINOPHIL # BLD AUTO: 0.05 10*3/MM3 (ref 0–0.4)
EOSINOPHIL NFR BLD AUTO: 0.3 % (ref 0.3–6.2)
ERYTHROCYTE [DISTWIDTH] IN BLOOD BY AUTOMATED COUNT: 16.4 % (ref 12.3–15.4)
FLUAV SUBTYP SPEC NAA+PROBE: NOT DETECTED
FLUBV RNA ISLT QL NAA+PROBE: NOT DETECTED
GLUCOSE SERPL-MCNC: 128 MG/DL (ref 65–99)
HADV DNA SPEC NAA+PROBE: NOT DETECTED
HCOV 229E RNA SPEC QL NAA+PROBE: NOT DETECTED
HCOV HKU1 RNA SPEC QL NAA+PROBE: NOT DETECTED
HCOV NL63 RNA SPEC QL NAA+PROBE: NOT DETECTED
HCOV OC43 RNA SPEC QL NAA+PROBE: NOT DETECTED
HCT VFR BLD AUTO: 23.8 % (ref 34–46.6)
HGB BLD-MCNC: 7.2 G/DL (ref 12–15.9)
HMPV RNA NPH QL NAA+NON-PROBE: NOT DETECTED
HPIV1 RNA ISLT QL NAA+PROBE: NOT DETECTED
HPIV2 RNA SPEC QL NAA+PROBE: NOT DETECTED
HPIV3 RNA NPH QL NAA+PROBE: NOT DETECTED
HPIV4 P GENE NPH QL NAA+PROBE: NOT DETECTED
IMM GRANULOCYTES # BLD AUTO: 0.17 10*3/MM3 (ref 0–0.05)
IMM GRANULOCYTES NFR BLD AUTO: 1.1 % (ref 0–0.5)
LYMPHOCYTES # BLD AUTO: 2.43 10*3/MM3 (ref 0.7–3.1)
LYMPHOCYTES NFR BLD AUTO: 16 % (ref 19.6–45.3)
M PNEUMO IGG SER IA-ACNC: NOT DETECTED
MCH RBC QN AUTO: 26.5 PG (ref 26.6–33)
MCHC RBC AUTO-ENTMCNC: 30.3 G/DL (ref 31.5–35.7)
MCV RBC AUTO: 87.5 FL (ref 79–97)
MONOCYTES # BLD AUTO: 0.58 10*3/MM3 (ref 0.1–0.9)
MONOCYTES NFR BLD AUTO: 3.8 % (ref 5–12)
NEUTROPHILS NFR BLD AUTO: 11.89 10*3/MM3 (ref 1.7–7)
NEUTROPHILS NFR BLD AUTO: 78.5 % (ref 42.7–76)
NRBC BLD AUTO-RTO: 0.2 /100 WBC (ref 0–0.2)
NT-PROBNP SERPL-MCNC: 4313 PG/ML (ref 0–900)
PLATELET # BLD AUTO: 422 10*3/MM3 (ref 140–450)
PMV BLD AUTO: 9.7 FL (ref 6–12)
POTASSIUM SERPL-SCNC: 3.1 MMOL/L (ref 3.5–5.2)
PROCALCITONIN SERPL-MCNC: 0.06 NG/ML (ref 0–0.25)
RBC # BLD AUTO: 2.72 10*6/MM3 (ref 3.77–5.28)
RHINOVIRUS RNA SPEC NAA+PROBE: NOT DETECTED
RSV RNA NPH QL NAA+NON-PROBE: NOT DETECTED
SARS-COV-2 RNA NPH QL NAA+NON-PROBE: NOT DETECTED
SODIUM SERPL-SCNC: 144 MMOL/L (ref 136–145)
WBC NRBC COR # BLD AUTO: 15.16 10*3/MM3 (ref 3.4–10.8)

## 2024-04-11 PROCEDURE — 94760 N-INVAS EAR/PLS OXIMETRY 1: CPT

## 2024-04-11 PROCEDURE — 80048 BASIC METABOLIC PNL TOTAL CA: CPT | Performed by: EMERGENCY MEDICINE

## 2024-04-11 PROCEDURE — 84145 PROCALCITONIN (PCT): CPT | Performed by: EMERGENCY MEDICINE

## 2024-04-11 PROCEDURE — 25010000002 CEFTRIAXONE PER 250 MG: Performed by: EMERGENCY MEDICINE

## 2024-04-11 PROCEDURE — 71045 X-RAY EXAM CHEST 1 VIEW: CPT

## 2024-04-11 PROCEDURE — 25010000002 SODIUM CHLORIDE 0.9 % WITH KCL 20 MEQ 20-0.9 MEQ/L-% SOLUTION: Performed by: INTERNAL MEDICINE

## 2024-04-11 PROCEDURE — 25010000002 VANCOMYCIN HCL 1.25 G RECONSTITUTED SOLUTION 1 EACH VIAL: Performed by: EMERGENCY MEDICINE

## 2024-04-11 PROCEDURE — 36415 COLL VENOUS BLD VENIPUNCTURE: CPT

## 2024-04-11 PROCEDURE — 83880 ASSAY OF NATRIURETIC PEPTIDE: CPT | Performed by: EMERGENCY MEDICINE

## 2024-04-11 PROCEDURE — 83605 ASSAY OF LACTIC ACID: CPT | Performed by: EMERGENCY MEDICINE

## 2024-04-11 PROCEDURE — 85025 COMPLETE CBC W/AUTO DIFF WBC: CPT | Performed by: EMERGENCY MEDICINE

## 2024-04-11 PROCEDURE — 87040 BLOOD CULTURE FOR BACTERIA: CPT | Performed by: EMERGENCY MEDICINE

## 2024-04-11 PROCEDURE — 94640 AIRWAY INHALATION TREATMENT: CPT

## 2024-04-11 PROCEDURE — 99285 EMERGENCY DEPT VISIT HI MDM: CPT

## 2024-04-11 PROCEDURE — 25010000002 FUROSEMIDE PER 20 MG: Performed by: EMERGENCY MEDICINE

## 2024-04-11 PROCEDURE — 94799 UNLISTED PULMONARY SVC/PX: CPT

## 2024-04-11 PROCEDURE — 25810000003 SODIUM CHLORIDE 0.9 % SOLUTION 250 ML FLEX CONT: Performed by: EMERGENCY MEDICINE

## 2024-04-11 PROCEDURE — 0202U NFCT DS 22 TRGT SARS-COV-2: CPT | Performed by: EMERGENCY MEDICINE

## 2024-04-11 RX ORDER — ROPINIROLE 2 MG/1
2 TABLET, FILM COATED ORAL NIGHTLY
Status: DISCONTINUED | OUTPATIENT
Start: 2024-04-11 | End: 2024-04-21 | Stop reason: HOSPADM

## 2024-04-11 RX ORDER — AMLODIPINE BESYLATE 10 MG/1
10 TABLET ORAL DAILY
Status: DISCONTINUED | OUTPATIENT
Start: 2024-04-12 | End: 2024-04-13

## 2024-04-11 RX ORDER — AMOXICILLIN 250 MG
2 CAPSULE ORAL 2 TIMES DAILY PRN
Status: DISCONTINUED | OUTPATIENT
Start: 2024-04-11 | End: 2024-04-21 | Stop reason: HOSPADM

## 2024-04-11 RX ORDER — ROFLUMILAST 500 UG/1
500 TABLET ORAL DAILY
Status: DISCONTINUED | OUTPATIENT
Start: 2024-04-12 | End: 2024-04-21 | Stop reason: HOSPADM

## 2024-04-11 RX ORDER — ATORVASTATIN CALCIUM 20 MG/1
40 TABLET, FILM COATED ORAL DAILY
Status: DISCONTINUED | OUTPATIENT
Start: 2024-04-12 | End: 2024-04-21 | Stop reason: HOSPADM

## 2024-04-11 RX ORDER — BISACODYL 5 MG/1
5 TABLET, DELAYED RELEASE ORAL DAILY PRN
Status: DISCONTINUED | OUTPATIENT
Start: 2024-04-11 | End: 2024-04-21 | Stop reason: HOSPADM

## 2024-04-11 RX ORDER — SERTRALINE HYDROCHLORIDE 100 MG/1
100 TABLET, FILM COATED ORAL DAILY
Status: DISCONTINUED | OUTPATIENT
Start: 2024-04-12 | End: 2024-04-21 | Stop reason: HOSPADM

## 2024-04-11 RX ORDER — MULTIPLE VITAMINS W/ MINERALS TAB 9MG-400MCG
1 TAB ORAL DAILY
Status: DISCONTINUED | OUTPATIENT
Start: 2024-04-12 | End: 2024-04-21 | Stop reason: HOSPADM

## 2024-04-11 RX ORDER — CYCLOBENZAPRINE HCL 10 MG
10 TABLET ORAL NIGHTLY
Status: DISCONTINUED | OUTPATIENT
Start: 2024-04-11 | End: 2024-04-21 | Stop reason: HOSPADM

## 2024-04-11 RX ORDER — ACETAMINOPHEN 325 MG/1
650 TABLET ORAL EVERY 4 HOURS PRN
Status: DISCONTINUED | OUTPATIENT
Start: 2024-04-11 | End: 2024-04-21 | Stop reason: HOSPADM

## 2024-04-11 RX ORDER — ZOLPIDEM TARTRATE 5 MG/1
10 TABLET ORAL NIGHTLY PRN
Status: DISCONTINUED | OUTPATIENT
Start: 2024-04-11 | End: 2024-04-13

## 2024-04-11 RX ORDER — FUROSEMIDE 40 MG/1
40 TABLET ORAL 2 TIMES DAILY
Status: DISCONTINUED | OUTPATIENT
Start: 2024-04-11 | End: 2024-04-12

## 2024-04-11 RX ORDER — PANTOPRAZOLE SODIUM 40 MG/1
40 TABLET, DELAYED RELEASE ORAL
Status: DISCONTINUED | OUTPATIENT
Start: 2024-04-12 | End: 2024-04-12

## 2024-04-11 RX ORDER — LISINOPRIL 40 MG/1
40 TABLET ORAL DAILY
Status: DISCONTINUED | OUTPATIENT
Start: 2024-04-12 | End: 2024-04-13

## 2024-04-11 RX ORDER — POLYETHYLENE GLYCOL 3350 17 G/17G
17 POWDER, FOR SOLUTION ORAL DAILY PRN
Status: DISCONTINUED | OUTPATIENT
Start: 2024-04-11 | End: 2024-04-21 | Stop reason: HOSPADM

## 2024-04-11 RX ORDER — FUROSEMIDE 10 MG/ML
40 INJECTION INTRAMUSCULAR; INTRAVENOUS ONCE
Status: COMPLETED | OUTPATIENT
Start: 2024-04-11 | End: 2024-04-11

## 2024-04-11 RX ORDER — ONDANSETRON 4 MG/1
4 TABLET, ORALLY DISINTEGRATING ORAL EVERY 6 HOURS PRN
Status: DISCONTINUED | OUTPATIENT
Start: 2024-04-11 | End: 2024-04-21 | Stop reason: HOSPADM

## 2024-04-11 RX ORDER — CARVEDILOL 6.25 MG/1
6.25 TABLET ORAL 2 TIMES DAILY WITH MEALS
Status: DISCONTINUED | OUTPATIENT
Start: 2024-04-11 | End: 2024-04-13

## 2024-04-11 RX ORDER — SODIUM CHLORIDE AND POTASSIUM CHLORIDE 150; 900 MG/100ML; MG/100ML
100 INJECTION, SOLUTION INTRAVENOUS CONTINUOUS
Status: DISCONTINUED | OUTPATIENT
Start: 2024-04-11 | End: 2024-04-12

## 2024-04-11 RX ORDER — UREA 10 %
3 LOTION (ML) TOPICAL NIGHTLY PRN
Status: DISCONTINUED | OUTPATIENT
Start: 2024-04-11 | End: 2024-04-21 | Stop reason: HOSPADM

## 2024-04-11 RX ORDER — HYDROCODONE BITARTRATE AND ACETAMINOPHEN 7.5; 325 MG/1; MG/1
1 TABLET ORAL EVERY 6 HOURS PRN
Status: DISPENSED | OUTPATIENT
Start: 2024-04-11 | End: 2024-04-16

## 2024-04-11 RX ORDER — BISACODYL 10 MG
10 SUPPOSITORY, RECTAL RECTAL DAILY PRN
Status: DISCONTINUED | OUTPATIENT
Start: 2024-04-11 | End: 2024-04-21 | Stop reason: HOSPADM

## 2024-04-11 RX ORDER — BUDESONIDE 0.5 MG/2ML
0.5 INHALANT ORAL
Status: DISCONTINUED | OUTPATIENT
Start: 2024-04-11 | End: 2024-04-21 | Stop reason: HOSPADM

## 2024-04-11 RX ORDER — ONDANSETRON 2 MG/ML
4 INJECTION INTRAMUSCULAR; INTRAVENOUS EVERY 6 HOURS PRN
Status: DISCONTINUED | OUTPATIENT
Start: 2024-04-11 | End: 2024-04-21 | Stop reason: HOSPADM

## 2024-04-11 RX ORDER — IPRATROPIUM BROMIDE AND ALBUTEROL SULFATE 2.5; .5 MG/3ML; MG/3ML
3 SOLUTION RESPIRATORY (INHALATION) EVERY 4 HOURS PRN
Status: DISCONTINUED | OUTPATIENT
Start: 2024-04-11 | End: 2024-04-21 | Stop reason: HOSPADM

## 2024-04-11 RX ORDER — CETIRIZINE HYDROCHLORIDE 10 MG/1
10 TABLET ORAL DAILY
Status: DISCONTINUED | OUTPATIENT
Start: 2024-04-12 | End: 2024-04-21 | Stop reason: HOSPADM

## 2024-04-11 RX ORDER — ALBUTEROL SULFATE 2.5 MG/3ML
2.5 SOLUTION RESPIRATORY (INHALATION) EVERY 6 HOURS PRN
Status: DISCONTINUED | OUTPATIENT
Start: 2024-04-11 | End: 2024-04-21 | Stop reason: HOSPADM

## 2024-04-11 RX ORDER — POTASSIUM CHLORIDE 750 MG/1
20 TABLET, FILM COATED, EXTENDED RELEASE ORAL 2 TIMES DAILY WITH MEALS
Status: DISCONTINUED | OUTPATIENT
Start: 2024-04-11 | End: 2024-04-13

## 2024-04-11 RX ADMIN — POTASSIUM CHLORIDE 20 MEQ: 750 TABLET, EXTENDED RELEASE ORAL at 23:51

## 2024-04-11 RX ADMIN — POTASSIUM CHLORIDE AND SODIUM CHLORIDE 100 ML/HR: 900; 150 INJECTION, SOLUTION INTRAVENOUS at 23:19

## 2024-04-11 RX ADMIN — FUROSEMIDE 40 MG: 10 INJECTION, SOLUTION INTRAMUSCULAR; INTRAVENOUS at 19:32

## 2024-04-11 RX ADMIN — BUDESONIDE 0.5 MG: 0.5 INHALANT RESPIRATORY (INHALATION) at 23:35

## 2024-04-11 RX ADMIN — VANCOMYCIN HYDROCHLORIDE 1250 MG: 1.25 INJECTION, POWDER, LYOPHILIZED, FOR SOLUTION INTRAVENOUS at 20:55

## 2024-04-11 RX ADMIN — CYCLOBENZAPRINE 10 MG: 10 TABLET, FILM COATED ORAL at 23:51

## 2024-04-11 RX ADMIN — APIXABAN 5 MG: 5 TABLET, FILM COATED ORAL at 23:51

## 2024-04-11 RX ADMIN — CEFTRIAXONE SODIUM 1000 MG: 1 INJECTION, POWDER, FOR SOLUTION INTRAMUSCULAR; INTRAVENOUS at 20:02

## 2024-04-11 NOTE — ED PROVIDER NOTES
EMERGENCY DEPARTMENT ENCOUNTER    Room Number:  31/31  PCP: Javan Martinez MD  Historian: Patient, family      HPI:  Chief Complaint: Fevers, shortness of breath  A complete HPI/ROS/PMH/PSH/SH/FH are unobtainable due to: None    Context: Paz Browne is a 68 y.o. female who presents to the ED via private vehicle c/o acute increased shortness of breath, fevers, was recently discharged after diagnosis of pneumonia and endocarditis.  Has been on home IV antibiotics through PICC line.  However her symptoms are getting worse.  Has had increased swelling as well.  Persistent cough.  Does take Eliquis for atrial fibrillation.  Has been on Rocephin and ampicillin at home.      MEDICAL RECORD REVIEW    External (non-ED) record review: Adult CHIO April 4, 2024 showed ejection fraction of 61 to 65%, bioprosthetic mitral valve replacement appears to be well-seated, leaflets are thickened, calcified nonmobile echodensity attached to the posterior leaflet of the bioprosthetic mitral valve replacement, appears to be a calcified rim of a prior vegetation, no obvious acute or mobile vegetations              PAST MEDICAL HISTORY  Active Ambulatory Problems     Diagnosis Date Noted    PAF (paroxysmal atrial fibrillation) 01/25/2016    Hypertension     Hyperlipidemia     Pain medication agreement 05/04/2016    Hiatal hernia 05/04/2016    Primary osteoarthritis involving multiple joints 05/04/2016    Pulmonary hypertension     S/P TVR (tricuspid valve repair) 07/07/2016    Pulmonary nodule 10/13/2016    Restless leg syndrome 11/18/2016    Chronic low back pain 08/02/2017    Dysplastic polyp of colon 08/07/2017    History of mitral valve replacement with tissue graft 08/11/2017    Chronic hypoxemic respiratory failure 08/13/2017    Anemia 08/09/2017    Celiac artery stenosis 08/24/2017    Intertrigo 08/25/2017    Abnormal EKG 06/30/2019    Muscle spasms of both lower extremities 12/22/2020    Iron deficiency anemia 03/30/2021     Adenomatous polyp of colon 03/30/2021    Gastroesophageal reflux disease without esophagitis 03/30/2021    Headache 07/04/2021    History of endocarditis 02/03/2022    Pneumonia of both lower lobes due to infectious organism 10/16/2022    Peptic ulcer disease 10/17/2022    Peripheral vascular disease 10/17/2022    Hyperlipidemia 10/17/2022    Hyperglycemia 10/17/2022    COPD with acute exacerbation 10/19/2022    Chronic diastolic CHF (congestive heart failure) 03/29/2023    Chronic bilateral low back pain 04/19/2023    COPD exacerbation 11/17/2023    Leukocytosis 03/02/2024    Elevated troponin 03/02/2024    Pneumonia 04/01/2024    Acute-on-chronic respiratory failure 04/01/2024    Septicemia due to enterococcus 04/02/2024    Chronic heart failure with preserved ejection fraction (HFpEF) 04/05/2024     Resolved Ambulatory Problems     Diagnosis Date Noted    Tachycardia     Renal failure     Palpitations     Mitral regurgitation     Heart failure 06/08/2016    Long term current use of anticoagulant 10/13/2016    Aspiration pneumonia 10/14/2016    Hemoptysis 10/14/2016    Lower GI bleed 08/09/2017    Precordial pain 08/11/2017    Oral candidiasis 08/14/2017    VAN (acute kidney injury) 08/15/2017    GI bleed 12/01/2017    Acute exacerbation of chronic obstructive pulmonary disease (COPD) 01/02/2018    Pneumonia due to infectious organism 07/18/2018    Insomnia due to medical condition 02/01/2019    COPD (chronic obstructive pulmonary disease) 06/30/2019    Shingles 04/08/2020    Vertigo 10/06/2020    Dark stools 03/30/2021    Acute hyperkalemia 05/10/2021    Tremor 05/10/2021    Anemia 05/10/2021    COPD exacerbation 06/14/2021    Septicemia 07/02/2021    Bacteremia 07/03/2021    Acute hypoxemic respiratory failure 10/17/2022    COPD (chronic obstructive pulmonary disease) 10/17/2022    Pulmonary nodule 10/17/2022    Hypokalemia 10/17/2022    Chronic respiratory failure 10/19/2022     Past Medical History:    Diagnosis Date    Asthma     Atrial flutter     Cataract     Chronic respiratory failure with hypoxia     Colon polyp     History of CHF (congestive heart failure)     History of home oxygen therapy     Infectious viral hepatitis     Long term (current) use of anticoagulants          PAST SURGICAL HISTORY  Past Surgical History:   Procedure Laterality Date    CARDIAC CATHETERIZATION  09/01/2014    Right dominant systemt, normal coronary arteries.     CARDIAC CATHETERIZATION Left 6/10/2016    Procedure: Cardiac catheterization;  Surgeon: Sergei Hall MD;  Location: [facility] CATH INVASIVE LOCATION;  Service:     CARDIAC CATHETERIZATION N/A 6/10/2016    Procedure: Right Heart Cath;  Surgeon: Sergei Hall MD;  Location: [facility] CATH INVASIVE LOCATION;  Service:     CATARACT EXTRACTION      COLONOSCOPY      COLONOSCOPY N/A 8/4/2017    Procedure: COLONOSCOPY TO CECUM/TI WITH POLYPECTOMY ( COLD BX);  Surgeon: Cleveland Devine MD;  Location: [facility] ENDOSCOPY;  Service:     COLONOSCOPY N/A 8/10/2017    Procedure: COLONOSCOPY to cecum and TI with 2 clips placed at transverse;  Surgeon: Earnest PALOMO MD;  Location: [facility] ENDOSCOPY;  Service:     COLONOSCOPY N/A 12/22/2017    Procedure: COLONOSCOPY INTO CECUM WITH COLD POLYPECTOMIES;  Surgeon: Cleveland Devine MD;  Location: [facility] ENDOSCOPY;  Service:     COLONOSCOPY N/A 5/17/2021    Procedure: COLONOSCOPY to cecum;  Surgeon: Cleveland Devine MD;  Location: [facility] ENDOSCOPY;  Service: Gastroenterology;  Laterality: N/A;  Pre: Fe deficency anemia, h/x of polyps  Post: fair prep, normal    ENDOSCOPY N/A 8/17/2017    Procedure: ESOPHAGOGASTRODUODENOSCOPY;  Surgeon: Porsha Ruby MD;  Location: [facility] ENDOSCOPY;  Service:     ENDOSCOPY N/A 12/22/2017    Procedure: ESOPHAGOGASTRODUODENOSCOPY WITH BIOPSIES;  Surgeon: Cleveland Devine MD;  Location: [facility] ENDOSCOPY;  Service:     ENDOSCOPY N/A 5/17/2021    Procedure: ESOPHAGOGASTRODUODENOSCOPY  with biopsies;  Surgeon: Cleveland Devine MD;   Location: [facility] ENDOSCOPY;  Service: Gastroenterology;  Laterality: N/A;  Pre: Fe deficency anemia, nausea, heme positive stool   Post: gastritis, sloughing of distal esophagus mucosa    GALLBLADDER SURGERY      HEMORRHOIDECTOMY      HYSTERECTOMY      KIDNEY SURGERY  2013    Stent placement    MAZE PROCEDURE      MITRAL VALVE REPLACEMENT      TONSILLECTOMY      TRICUSPID VALVE REPLACEMENT           FAMILY HISTORY  Family History   Adopted: Yes   Problem Relation Age of Onset    No Known Problems Mother     No Known Problems Father          SOCIAL HISTORY  Social History     Socioeconomic History    Marital status:     Number of children: 2   Tobacco Use    Smoking status: Former     Current packs/day: 0.00     Average packs/day: 3.0 packs/day for 54.0 years (162.0 ttl pk-yrs)     Types: Cigarettes     Start date:      Quit date:      Years since quittin.2     Passive exposure: Past    Smokeless tobacco: Never    Tobacco comments:     caffeine - tea or mt dew    Vaping Use    Vaping status: Never Used   Substance and Sexual Activity    Alcohol use: No    Drug use: No    Sexual activity: Defer         ALLERGIES  Bupropion, Cephalexin, and Metoprolol        REVIEW OF SYSTEMS  Review of Systems     All systems reviewed and negative except for those discussed in HPI.       PHYSICAL EXAM    I have reviewed the triage vital signs and nursing notes.    ED Triage Vitals   Temp Heart Rate Resp BP SpO2   24 1746 24 1750 24 1750 24 1752 24 1750   98.8 °F (37.1 °C) (!) 145 20 139/78 90 %      Temp src Heart Rate Source Patient Position BP Location FiO2 (%)   24 1746 24 1750 24 1752 24 1752 --   Tympanic Monitor Lying Right arm        Physical Exam  General: Mildly ill-appearing, nontoxic, nondiaphoretic  HEENT: Mucous membranes moist, atraumatic, EOMI  Neck: Full ROM  Pulm: Symmetric chest rise, nonlabored, lungs slightly diminished bilaterally but  no overt wheezes/rales  Cardiovascular: Irregularly irregular tachycardia, intact distal pulses, 1+ pitting edema bilateral lower extremities  GI: Soft, nontender, nondistended, no rebound, no guarding, bowel sounds present  MSK: Full ROM, no deformity  Skin: Warm, dry  Neuro: Awake, alert, oriented x 4, GCS 15, moving all extremities, no focal deficits  Psych: Calm, cooperative        LAB RESULTS  Recent Results (from the past 24 hour(s))   Basic Metabolic Panel    Collection Time: 04/11/24  6:00 PM    Specimen: Blood   Result Value Ref Range    Glucose 128 (H) 65 - 99 mg/dL    BUN 9 8 - 23 mg/dL    Creatinine 0.92 0.57 - 1.00 mg/dL    Sodium 144 136 - 145 mmol/L    Potassium 3.1 (L) 3.5 - 5.2 mmol/L    Chloride 99 98 - 107 mmol/L    CO2 33.0 (H) 22.0 - 29.0 mmol/L    Calcium 8.2 (L) 8.6 - 10.5 mg/dL    BUN/Creatinine Ratio 9.8 7.0 - 25.0    Anion Gap 12.0 5.0 - 15.0 mmol/L    eGFR 67.1 >60.0 mL/min/1.73   Procalcitonin    Collection Time: 04/11/24  6:00 PM    Specimen: Blood   Result Value Ref Range    Procalcitonin 0.06 0.00 - 0.25 ng/mL   CBC Auto Differential    Collection Time: 04/11/24  6:00 PM    Specimen: Blood   Result Value Ref Range    WBC 15.16 (H) 3.40 - 10.80 10*3/mm3    RBC 2.72 (L) 3.77 - 5.28 10*6/mm3    Hemoglobin 7.2 (L) 12.0 - 15.9 g/dL    Hematocrit 23.8 (L) 34.0 - 46.6 %    MCV 87.5 79.0 - 97.0 fL    MCH 26.5 (L) 26.6 - 33.0 pg    MCHC 30.3 (L) 31.5 - 35.7 g/dL    RDW 16.4 (H) 12.3 - 15.4 %    RDW-SD 51.1 37.0 - 54.0 fl    MPV 9.7 6.0 - 12.0 fL    Platelets 422 140 - 450 10*3/mm3    Neutrophil % 78.5 (H) 42.7 - 76.0 %    Lymphocyte % 16.0 (L) 19.6 - 45.3 %    Monocyte % 3.8 (L) 5.0 - 12.0 %    Eosinophil % 0.3 0.3 - 6.2 %    Basophil % 0.3 0.0 - 1.5 %    Immature Grans % 1.1 (H) 0.0 - 0.5 %    Neutrophils, Absolute 11.89 (H) 1.70 - 7.00 10*3/mm3    Lymphocytes, Absolute 2.43 0.70 - 3.10 10*3/mm3    Monocytes, Absolute 0.58 0.10 - 0.90 10*3/mm3    Eosinophils, Absolute 0.05 0.00 - 0.40 10*3/mm3     Basophils, Absolute 0.04 0.00 - 0.20 10*3/mm3    Immature Grans, Absolute 0.17 (H) 0.00 - 0.05 10*3/mm3    nRBC 0.2 0.0 - 0.2 /100 WBC   BNP    Collection Time: 04/11/24  6:00 PM    Specimen: Blood   Result Value Ref Range    proBNP 4,313.0 (H) 0.0 - 900.0 pg/mL   Respiratory Panel PCR w/COVID-19(SARS-CoV-2) [facility]ILIN/DEBBIE/PAD/COR/DAVID In-House, NP Swab in UTM/VTM, 2 HR TAT - Swab, Nasopharynx    Collection Time: 04/11/24  6:01 PM    Specimen: Nasopharynx; Swab   Result Value Ref Range    ADENOVIRUS, PCR Not Detected Not Detected    Coronavirus 229E Not Detected Not Detected    Coronavirus HKU1 Not Detected Not Detected    Coronavirus NL63 Not Detected Not Detected    Coronavirus OC43 Not Detected Not Detected    COVID19 Not Detected Not Detected - Ref. Range    Human Metapneumovirus Not Detected Not Detected    Human Rhinovirus/Enterovirus Not Detected Not Detected    Influenza A PCR Not Detected Not Detected    Influenza B PCR Not Detected Not Detected    Parainfluenza Virus 1 Not Detected Not Detected    Parainfluenza Virus 2 Not Detected Not Detected    Parainfluenza Virus 3 Not Detected Not Detected    Parainfluenza Virus 4 Not Detected Not Detected    RSV, PCR Not Detected Not Detected    Bordetella pertussis pcr Not Detected Not Detected    Bordetella parapertussis PCR Not Detected Not Detected    Chlamydophila pneumoniae PCR Not Detected Not Detected    Mycoplasma pneumo by PCR Not Detected Not Detected       Ordered the above labs and independently interpreted results. My findings will be discussed in the medical decision making section below        RADIOLOGY  XR Chest 1 View    Result Date: 4/11/2024  Portable chest radiograph  HISTORY: Cough, fevers  TECHNIQUE: Single AP portable radiograph of the chest  COMPARISON: Chest radiograph 4/5/2024      Findings impression: Background interstitial thickening is present throughout the bilateral lungs with many areas demonstrate a somewhat nodular appearance which  cannot be evaluated on plain film radiographs, similar that seen on prior radiographs, and best seen on CT chest 3/2/2024. Please refer to this dictation for further formation and follow-up recommendations.. There is worsening pulmonary pacification within the right lung with probable small bilateral pleural effusions. Cardiac silhouette is mildly enlarged. Constellation findings are suggestive of worsening pulmonary edema and/or multifocal pneumonia in the appropriate clinical context. Correlation with patient history is recommended with follow-up chest CT if clinically indicated. No pneumothorax is seen. There are median sternotomy wires. A left PICC tip terminates over the expected location of the superior vena cava.    This report was finalized on 4/11/2024 7:03 PM by Dr. Papo De La Torre M.D on Workstation: [id number]       Ordered the above noted radiological studies.  Independently interpreted by me and my independent review of findings can be found in the ED Course.  See dictation for official radiology interpretation.      PROCEDURES    Procedures          MEDICATIONS GIVEN IN ER    Medications   Vancomycin HCl 1,250 mg in sodium chloride 0.9 % 250 mL VTB (has no administration in time range)   furosemide (LASIX) injection 40 mg (has no administration in time range)         PROGRESS, DATA ANALYSIS, CONSULTS, AND MEDICAL DECISION MAKING    Please note that this section constitutes my independent interpretation of clinical data including lab results, radiology, EKG's.  This constitutes my independent professional opinion regarding differential diagnosis and management of this patient.  It may include any factors such as history from outside sources, review of external records, social determinants of health, management of medications, response to those treatments, and discussions with other providers.    Differential Diagnosis and Plan: Initial concern for refractory pneumonia, refractory endocarditis, heart  failure, renal failure, electrolyte normalities, among others.  Plan for labs, chest x-ray, and symptomatic control with likely hospitalization given the increased oxygen requirement and worsening of symptoms.      Additional sources:  - Discussed/ obtained information from independent historians:       - (Social Determinants of Health): None     - Shared decision making:  Patient and family are both fully updated on and in agreement with the course and plan moving forward    ED Course as of 04/11/24 2047   Thu Apr 11, 2024   1828 WBC(!): 15.16  11.3 six days ago [DC]   1828 Hemoglobin(!): 7.2  chronic [DC]   1828 XR Chest 1 View  Per my independent interpretation of the chest x-ray, no evidence of pneumothorax, possible bilateral pleural effusions [DC]   1847 Procalcitonin: 0.06 [DC]   1847 Glucose(!): 128 [DC]   1847 BUN: 9 [DC]   1847 Creatinine: 0.92 [DC]   1847 Sodium: 144 [DC]   1847 Potassium(!): 3.1 [DC]   1908 proBNP(!): 4,313.0  2009 eleven days ago [DC]   1933 Discussed with Dr. Bello, [facility], discussed patient's clinical course and findings today, treatment modalities, and need for hospitalization. [DC]      ED Course User Index  [DC] Gavino Esteban MD     A-fib with RVR is likely induced from underlying ongoing infection, will not get aggressive with rate control at this time.  Will add on Vanc for antibiotic control, Lasix and given increased edema and elevated BNP.  Patient on 6 L nasal cannula at this point which is an increase from 2 L.  Plan for hospitalization for ongoing cardiology and infectious disease evaluation.  All questions or concerns addressed.    Hospitalization Considered?: yes    Orders Placed During This Visit:  Orders Placed This Encounter   Procedures    Respiratory Panel PCR w/COVID-19(SARS-CoV-2) [facility]/MARILIN/DEBBIE/PAD/COR/DAVID In-House, NP Swab in UTM/VTM, 2 HR TAT - Swab, Nasopharynx    Blood Culture - Blood,    Blood Culture - Blood,    XR Chest 1 View    Basic Metabolic Panel     Procalcitonin    CBC Auto Differential    BNP    Lactic Acid, Plasma    CBC & Differential       Additional orders considered but not placed:      Independent interpretation of labs, radiology studies, and discussions with consultants: See ED Course        AS OF 19:17 EDT VITALS:    BP - 127/72  HR - (!) 146  TEMP - 98.8 °F (37.1 °C) (Tympanic)  02 SATS - 97%          DIAGNOSIS   Diagnosis Plan   1. Sepsis, due to unspecified organism, unspecified whether acute organ dysfunction present        2. Acute on chronic congestive heart failure, unspecified heart failure type        3. Atrial fibrillation with RVR        4. History of atrial fibrillation        5. Chronic anticoagulation        6. Hypoxia        7. History of COPD        8. History of endocarditis                   DISPOSITION  ED Disposition       ED Disposition   Intended Admit    Condition   --    Comment   --                Please note that portions of this document were completed with a voice recognition program.    Note Disclaimer: At [facility], we believe that sharing information builds trust and better relationships. You are receiving this note because you recently visited [facility]. It is possible you will see health information before a provider has talked with you about it. This kind of information can be easy to misunderstand. To help you fully understand what it means for your health, we urge you to discuss this note with your provider.                       Gavino Esteban MD  04/11/24 2050

## 2024-04-11 NOTE — OUTREACH NOTE
COPD/PN Week 1 Survey      Flowsheet Row Responses   [facility] patient discharged from? [place]   Does the patient have one of the following disease processes/diagnoses(primary or secondary)? Pneumonia   Week 1 attempt successful? Yes   Call start time 1038   Call end time 1045   Discharge diagnosis PNA   Person spoke with today (if not patient) and relationship spouse   Medication alerts for this patient PICC infusions   Meds reviewed with patient/caregiver? Yes   Is the patient having any side effects they believe may be caused by any medication additions or changes? No   Does the patient have all medications ordered at discharge? Yes   Is the patient taking all medications as directed (includes completed medication regime)? Yes   Comments regarding appointments [facility] for weekly PICC care/labs   Does the patient have a primary care provider?  Yes   Does the patient have an appointment with their PCP or specialist within 7 days of discharge? Greater than 7 days   Comments regarding PCP 4/25/24   Nursing Interventions Verified appointment date/time/provider   Has the patient kept scheduled appointments due by today? N/A   Has home health visited the patient within 72 hours of discharge? N/A   Pulse Ox monitoring Intermittent   Pulse Ox device source Patient   O2 Sat comments 94% on O2   O2 Sat: education provided Sat levels, Monitoring frequency   Psychosocial issues? No   Comments Spouse reports pt's HR [id number] last night with some diarrhea episodes, has not checked HR this am. Enc spouse to check HR while pt is asleep and then when awake, seek medical advice if HR stil over 100   Did the patient receive a copy of their discharge instructions? Yes   Nursing interventions Reviewed instructions with patient   What is the patient's perception of their health status since discharge? Improving   Nursing Interventions Nurse provided patient education   Is the patient/caregiver able to teach  back the hierarchy of who to call/visit for symptoms/problems? PCP, Specialist, Home health nurse, Urgent Care, ED, 911 Yes   Patient reports what zone on this call? Green Zone   Green Zone Reports doing well, Breathing without shortness of breath   Green Zone interventions: Take daily medications, Use oxygen as prescribed, At all times avoid cigarette smoking, vaping and inhaled irritants   Is the patient/caregiver able to teach back signs and symptoms of worsening condition: Fever/chills, Shortness of breath, Chest pain   Is the patient/caregiver able to teach back importance of completing antibiotic course of treatment? Yes   Week 1 call completed? Yes   Is the patient interested in additional calls from an ambulatory ? No   Would this patient benefit from a Referral to [facility] Social Work? No   Call end time 1045            Almita KRAUS - Registered Nurse

## 2024-04-12 ENCOUNTER — APPOINTMENT (OUTPATIENT)
Dept: CT IMAGING | Facility: HOSPITAL | Age: 69
DRG: 871 | End: 2024-04-12
Payer: MEDICARE

## 2024-04-12 PROBLEM — J44.9 COPD (CHRONIC OBSTRUCTIVE PULMONARY DISEASE): Status: ACTIVE | Noted: 2024-04-12

## 2024-04-12 PROBLEM — I50.33 ACUTE ON CHRONIC DIASTOLIC CONGESTIVE HEART FAILURE: Status: ACTIVE | Noted: 2024-04-05

## 2024-04-12 LAB
ABO GROUP BLD: NORMAL
ALBUMIN SERPL-MCNC: 3.5 G/DL (ref 3.5–5.2)
ALP SERPL-CCNC: 69 U/L (ref 39–117)
ALT SERPL W P-5'-P-CCNC: 17 U/L (ref 1–33)
ANION GAP SERPL CALCULATED.3IONS-SCNC: 8.6 MMOL/L (ref 5–15)
AST SERPL-CCNC: 24 U/L (ref 1–32)
BACTERIA SPEC RESP CULT: NORMAL
BILIRUB CONJ SERPL-MCNC: <0.2 MG/DL (ref 0–0.3)
BILIRUB INDIRECT SERPL-MCNC: ABNORMAL MG/DL
BILIRUB SERPL-MCNC: 0.5 MG/DL (ref 0–1.2)
BLD GP AB SCN SERPL QL: NEGATIVE
BUN SERPL-MCNC: 8 MG/DL (ref 8–23)
BUN/CREAT SERPL: 9.8 (ref 7–25)
CALCIUM SPEC-SCNC: 7.9 MG/DL (ref 8.6–10.5)
CHLORIDE SERPL-SCNC: 102 MMOL/L (ref 98–107)
CO2 SERPL-SCNC: 33.4 MMOL/L (ref 22–29)
CREAT SERPL-MCNC: 0.82 MG/DL (ref 0.57–1)
DEPRECATED RDW RBC AUTO: 48.6 FL (ref 37–54)
EGFRCR SERPLBLD CKD-EPI 2021: 77.1 ML/MIN/1.73
ERYTHROCYTE [DISTWIDTH] IN BLOOD BY AUTOMATED COUNT: 16.3 % (ref 12.3–15.4)
GLUCOSE SERPL-MCNC: 126 MG/DL (ref 65–99)
GRAM STN SPEC: NORMAL
HCT VFR BLD AUTO: 22.4 % (ref 34–46.6)
HGB BLD-MCNC: 6.9 G/DL (ref 12–15.9)
MCH RBC QN AUTO: 25.8 PG (ref 26.6–33)
MCHC RBC AUTO-ENTMCNC: 30.8 G/DL (ref 31.5–35.7)
MCV RBC AUTO: 83.9 FL (ref 79–97)
PLATELET # BLD AUTO: 384 10*3/MM3 (ref 140–450)
PMV BLD AUTO: 9.5 FL (ref 6–12)
POTASSIUM SERPL-SCNC: 3.6 MMOL/L (ref 3.5–5.2)
POTASSIUM SERPL-SCNC: 3.9 MMOL/L (ref 3.5–5.2)
PROT SERPL-MCNC: 5.9 G/DL (ref 6–8.5)
QT INTERVAL: 291 MS
QTC INTERVAL: 428 MS
RBC # BLD AUTO: 2.67 10*6/MM3 (ref 3.77–5.28)
RH BLD: POSITIVE
SODIUM SERPL-SCNC: 144 MMOL/L (ref 136–145)
T&S EXPIRATION DATE: NORMAL
WBC NRBC COR # BLD AUTO: 14.36 10*3/MM3 (ref 3.4–10.8)

## 2024-04-12 PROCEDURE — 25010000002 CEFTRIAXONE PER 250 MG: Performed by: INTERNAL MEDICINE

## 2024-04-12 PROCEDURE — 93010 ELECTROCARDIOGRAM REPORT: CPT | Performed by: INTERNAL MEDICINE

## 2024-04-12 PROCEDURE — 36430 TRANSFUSION BLD/BLD COMPNT: CPT

## 2024-04-12 PROCEDURE — 97162 PT EVAL MOD COMPLEX 30 MIN: CPT

## 2024-04-12 PROCEDURE — 71250 CT THORAX DX C-: CPT

## 2024-04-12 PROCEDURE — 86901 BLOOD TYPING SEROLOGIC RH(D): CPT | Performed by: HOSPITALIST

## 2024-04-12 PROCEDURE — 86850 RBC ANTIBODY SCREEN: CPT | Performed by: HOSPITALIST

## 2024-04-12 PROCEDURE — 25010000002 FUROSEMIDE PER 20 MG: Performed by: NURSE PRACTITIONER

## 2024-04-12 PROCEDURE — 94799 UNLISTED PULMONARY SVC/PX: CPT

## 2024-04-12 PROCEDURE — 25010000002 DIGOXIN PER 500 MCG: Performed by: NURSE PRACTITIONER

## 2024-04-12 PROCEDURE — 86900 BLOOD TYPING SEROLOGIC ABO: CPT

## 2024-04-12 PROCEDURE — 94761 N-INVAS EAR/PLS OXIMETRY MLT: CPT

## 2024-04-12 PROCEDURE — 25010000002 AMPICILLIN PER 500 MG: Performed by: NURSE PRACTITIONER

## 2024-04-12 PROCEDURE — 85027 COMPLETE CBC AUTOMATED: CPT | Performed by: INTERNAL MEDICINE

## 2024-04-12 PROCEDURE — 80076 HEPATIC FUNCTION PANEL: CPT

## 2024-04-12 PROCEDURE — 99222 1ST HOSP IP/OBS MODERATE 55: CPT | Performed by: NURSE PRACTITIONER

## 2024-04-12 PROCEDURE — 36415 COLL VENOUS BLD VENIPUNCTURE: CPT | Performed by: INTERNAL MEDICINE

## 2024-04-12 PROCEDURE — 86900 BLOOD TYPING SEROLOGIC ABO: CPT | Performed by: HOSPITALIST

## 2024-04-12 PROCEDURE — 97530 THERAPEUTIC ACTIVITIES: CPT

## 2024-04-12 PROCEDURE — 25010000002 FUROSEMIDE PER 20 MG: Performed by: HOSPITALIST

## 2024-04-12 PROCEDURE — 87205 SMEAR GRAM STAIN: CPT

## 2024-04-12 PROCEDURE — P9016 RBC LEUKOCYTES REDUCED: HCPCS

## 2024-04-12 PROCEDURE — 94664 DEMO&/EVAL PT USE INHALER: CPT

## 2024-04-12 PROCEDURE — 84132 ASSAY OF SERUM POTASSIUM: CPT | Performed by: HOSPITALIST

## 2024-04-12 PROCEDURE — 25010000002 ONDANSETRON PER 1 MG: Performed by: INTERNAL MEDICINE

## 2024-04-12 PROCEDURE — 99223 1ST HOSP IP/OBS HIGH 75: CPT | Performed by: INTERNAL MEDICINE

## 2024-04-12 PROCEDURE — 80048 BASIC METABOLIC PNL TOTAL CA: CPT | Performed by: INTERNAL MEDICINE

## 2024-04-12 PROCEDURE — 86923 COMPATIBILITY TEST ELECTRIC: CPT

## 2024-04-12 PROCEDURE — 63710000001 ONDANSETRON ODT 4 MG TABLET DISPERSIBLE: Performed by: INTERNAL MEDICINE

## 2024-04-12 PROCEDURE — 93005 ELECTROCARDIOGRAM TRACING: CPT | Performed by: NURSE PRACTITIONER

## 2024-04-12 RX ORDER — SODIUM CHLORIDE 0.9 % (FLUSH) 0.9 %
10 SYRINGE (ML) INJECTION EVERY 12 HOURS SCHEDULED
Status: DISCONTINUED | OUTPATIENT
Start: 2024-04-12 | End: 2024-04-21 | Stop reason: HOSPADM

## 2024-04-12 RX ORDER — PANTOPRAZOLE SODIUM 40 MG/10ML
40 INJECTION, POWDER, LYOPHILIZED, FOR SOLUTION INTRAVENOUS EVERY 12 HOURS SCHEDULED
Status: DISCONTINUED | OUTPATIENT
Start: 2024-04-13 | End: 2024-04-19

## 2024-04-12 RX ORDER — FUROSEMIDE 10 MG/ML
40 INJECTION INTRAMUSCULAR; INTRAVENOUS ONCE
Status: COMPLETED | OUTPATIENT
Start: 2024-04-12 | End: 2024-04-12

## 2024-04-12 RX ORDER — POTASSIUM CHLORIDE 750 MG/1
40 TABLET, FILM COATED, EXTENDED RELEASE ORAL EVERY 4 HOURS
Status: COMPLETED | OUTPATIENT
Start: 2024-04-12 | End: 2024-04-12

## 2024-04-12 RX ORDER — DIGOXIN 0.25 MG/ML
500 INJECTION INTRAMUSCULAR; INTRAVENOUS ONCE
Status: COMPLETED | OUTPATIENT
Start: 2024-04-12 | End: 2024-04-12

## 2024-04-12 RX ORDER — SODIUM CHLORIDE 9 MG/ML
40 INJECTION, SOLUTION INTRAVENOUS AS NEEDED
Status: DISCONTINUED | OUTPATIENT
Start: 2024-04-12 | End: 2024-04-21 | Stop reason: HOSPADM

## 2024-04-12 RX ORDER — SODIUM CHLORIDE 0.9 % (FLUSH) 0.9 %
10 SYRINGE (ML) INJECTION AS NEEDED
Status: DISCONTINUED | OUTPATIENT
Start: 2024-04-12 | End: 2024-04-21 | Stop reason: HOSPADM

## 2024-04-12 RX ORDER — FUROSEMIDE 10 MG/ML
40 INJECTION INTRAMUSCULAR; INTRAVENOUS EVERY 12 HOURS
Status: DISPENSED | OUTPATIENT
Start: 2024-04-12 | End: 2024-04-14

## 2024-04-12 RX ORDER — DIPHENHYDRAMINE HCL 25 MG
25 CAPSULE ORAL ONCE AS NEEDED
Status: DISCONTINUED | OUTPATIENT
Start: 2024-04-12 | End: 2024-04-21 | Stop reason: HOSPADM

## 2024-04-12 RX ORDER — SODIUM CHLORIDE 0.9 % (FLUSH) 0.9 %
20 SYRINGE (ML) INJECTION AS NEEDED
Status: DISCONTINUED | OUTPATIENT
Start: 2024-04-12 | End: 2024-04-21 | Stop reason: HOSPADM

## 2024-04-12 RX ADMIN — ROFLUMILAST 500 MCG: 500 TABLET ORAL at 08:41

## 2024-04-12 RX ADMIN — ATORVASTATIN CALCIUM 40 MG: 20 TABLET, FILM COATED ORAL at 08:42

## 2024-04-12 RX ADMIN — AMPICILLIN SODIUM 2 G: 2 INJECTION, POWDER, FOR SOLUTION INTRAVENOUS at 17:00

## 2024-04-12 RX ADMIN — AMPICILLIN SODIUM 2 G: 2 INJECTION, POWDER, FOR SOLUTION INTRAVENOUS at 22:15

## 2024-04-12 RX ADMIN — ROPINIROLE 2 MG: 2 TABLET, FILM COATED ORAL at 00:34

## 2024-04-12 RX ADMIN — POTASSIUM CHLORIDE 40 MEQ: 750 TABLET, EXTENDED RELEASE ORAL at 13:08

## 2024-04-12 RX ADMIN — CARVEDILOL 6.25 MG: 6.25 TABLET, FILM COATED ORAL at 08:41

## 2024-04-12 RX ADMIN — LISINOPRIL 40 MG: 40 TABLET ORAL at 08:42

## 2024-04-12 RX ADMIN — POTASSIUM CHLORIDE 20 MEQ: 750 TABLET, EXTENDED RELEASE ORAL at 17:52

## 2024-04-12 RX ADMIN — HYDROCODONE BITARTRATE AND ACETAMINOPHEN 1 TABLET: 7.5; 325 TABLET ORAL at 04:52

## 2024-04-12 RX ADMIN — CYCLOBENZAPRINE 10 MG: 10 TABLET, FILM COATED ORAL at 22:14

## 2024-04-12 RX ADMIN — HYDROCODONE BITARTRATE AND ACETAMINOPHEN 1 TABLET: 7.5; 325 TABLET ORAL at 22:36

## 2024-04-12 RX ADMIN — METOPROLOL TARTRATE 5 MG: 1 INJECTION, SOLUTION INTRAVENOUS at 06:59

## 2024-04-12 RX ADMIN — Medication 1 TABLET: at 08:42

## 2024-04-12 RX ADMIN — Medication 10 ML: at 13:08

## 2024-04-12 RX ADMIN — POTASSIUM CHLORIDE 40 MEQ: 750 TABLET, EXTENDED RELEASE ORAL at 08:42

## 2024-04-12 RX ADMIN — CEFTRIAXONE 2000 MG: 2 INJECTION, POWDER, FOR SOLUTION INTRAMUSCULAR; INTRAVENOUS at 08:42

## 2024-04-12 RX ADMIN — BUDESONIDE 0.5 MG: 0.5 INHALANT RESPIRATORY (INHALATION) at 07:37

## 2024-04-12 RX ADMIN — FUROSEMIDE 40 MG: 40 TABLET ORAL at 00:34

## 2024-04-12 RX ADMIN — ONDANSETRON 4 MG: 2 INJECTION INTRAMUSCULAR; INTRAVENOUS at 02:23

## 2024-04-12 RX ADMIN — IPRATROPIUM BROMIDE AND ALBUTEROL SULFATE 3 ML: .5; 3 SOLUTION RESPIRATORY (INHALATION) at 19:02

## 2024-04-12 RX ADMIN — FUROSEMIDE 40 MG: 10 INJECTION, SOLUTION INTRAMUSCULAR; INTRAVENOUS at 22:13

## 2024-04-12 RX ADMIN — AMLODIPINE BESYLATE 10 MG: 10 TABLET ORAL at 08:42

## 2024-04-12 RX ADMIN — CETIRIZINE HYDROCHLORIDE 10 MG: 10 TABLET ORAL at 08:42

## 2024-04-12 RX ADMIN — Medication 10 ML: at 22:15

## 2024-04-12 RX ADMIN — BUDESONIDE 0.5 MG: 0.5 INHALANT RESPIRATORY (INHALATION) at 19:02

## 2024-04-12 RX ADMIN — CARVEDILOL 6.25 MG: 6.25 TABLET, FILM COATED ORAL at 00:33

## 2024-04-12 RX ADMIN — DIGOXIN 500 MCG: 250 INJECTION, SOLUTION INTRAMUSCULAR; INTRAVENOUS; PARENTERAL at 10:55

## 2024-04-12 RX ADMIN — Medication 10 ML: at 22:14

## 2024-04-12 RX ADMIN — METOPROLOL TARTRATE 5 MG: 1 INJECTION, SOLUTION INTRAVENOUS at 04:30

## 2024-04-12 RX ADMIN — FUROSEMIDE 40 MG: 10 INJECTION, SOLUTION INTRAMUSCULAR; INTRAVENOUS at 07:00

## 2024-04-12 RX ADMIN — SERTRALINE 100 MG: 100 TABLET, FILM COATED ORAL at 08:42

## 2024-04-12 RX ADMIN — CARVEDILOL 6.25 MG: 6.25 TABLET, FILM COATED ORAL at 17:56

## 2024-04-12 RX ADMIN — ZOLPIDEM TARTRATE 10 MG: 5 TABLET ORAL at 01:07

## 2024-04-12 RX ADMIN — ONDANSETRON 4 MG: 4 TABLET, ORALLY DISINTEGRATING ORAL at 17:52

## 2024-04-12 RX ADMIN — CEFTRIAXONE 2000 MG: 2 INJECTION, POWDER, FOR SOLUTION INTRAMUSCULAR; INTRAVENOUS at 22:56

## 2024-04-12 RX ADMIN — ONDANSETRON 4 MG: 2 INJECTION INTRAMUSCULAR; INTRAVENOUS at 08:58

## 2024-04-12 RX ADMIN — ROPINIROLE 2 MG: 2 TABLET, FILM COATED ORAL at 22:14

## 2024-04-12 RX ADMIN — PANTOPRAZOLE SODIUM 40 MG: 40 TABLET, DELAYED RELEASE ORAL at 06:20

## 2024-04-12 NOTE — SIGNIFICANT NOTE
04/12/24 1311   OTHER   Discipline occupational therapist   Rehab Time/Intention   Session Not Performed other (see comments)  (Pt Hgb 6.9 not appropriate for OT eval this date, OT to f/u next date)   Recommendation   OT - Next Appointment 04/13/24

## 2024-04-12 NOTE — H&P
HISTORY AND PHYSICAL   [facility]        Date of Admission: 2024  Patient Identification:  Name: Paz Browne  Age: 68 y.o.  Sex: female  :  1955  MRN: [id number]                     Primary Care Physician: Javan Martinez MD    Chief Complaint:  68 year old female who presented to the emergency room with shortness of breath, fever and malaise; she was discharged last week after treatment of pneumonia and enterococcal bacteremia; she has  a picc line and is on iv antibiotics; she has had a cough and has been feeling worse; she has a bioprosthetic valve and mina was done last admission;     History of Present Illness:   As above    Past Medical History:  Past Medical History:   Diagnosis Date    VAN (acute kidney injury)     Anemia     Asthma     Atrial flutter     cardioversion    Cataract     Celiac artery stenosis     Chronic respiratory failure with hypoxia     Colon polyp     COPD (chronic obstructive pulmonary disease)     GI bleed     Hiatal hernia     History of CHF (congestive heart failure)     due to MR    History of home oxygen therapy     3 lpm NC    History of mitral valve replacement with tissue graft     Hyperlipidemia     Hypertension     Infectious viral hepatitis     B    Intertrigo     Long term (current) use of anticoagulants     Mitral regurgitation     s/p tissue MVR    PAF (paroxysmal atrial fibrillation)     s/p MAZE    Pneumonia     Pulmonary hypertension     S/P TVR (tricuspid valve repair) 2016     Past Surgical History:  Past Surgical History:   Procedure Laterality Date    CARDIAC CATHETERIZATION  2014    Right dominant systemt, normal coronary arteries.     CARDIAC CATHETERIZATION Left 6/10/2016    Procedure: Cardiac catheterization;  Surgeon: Sergei Hall MD;  Location: [facility] CATH INVASIVE LOCATION;  Service:     CARDIAC CATHETERIZATION N/A 6/10/2016    Procedure: Right Heart Cath;  Surgeon: Sergei Hall MD;  Location: [facility] CATH INVASIVE  LOCATION;  Service:     CATARACT EXTRACTION      COLONOSCOPY      COLONOSCOPY N/A 8/4/2017    Procedure: COLONOSCOPY TO CECUM/TI WITH POLYPECTOMY ( COLD BX);  Surgeon: Cleveland Devine MD;  [facility] END[facility];  Service:     COLONOSCOPY N/A 8/10/2017    Procedure: COLONOSCOPY to cecum and TI with 2 clips placed at transverse;  Surgeon: Earnest PALOMO MD;  [facility];  Service:     COLONOSCOPY N/A 12/22/2017    Procedure: COLONOSCOPY INTO CECUM WITH COLD POLYPECTOMIES;  Surgeon: Cleveland Devine MD;  [facility];  Service:     COLONOSCOPY N/A 5/17/2021    Procedure: COLONOSCOPY to cecum;  Surgeon: Celveland Devine MD;  Location: [facility] ENDOSCOPY;  Service: Gastroenterology;  Laterality: N/A;  Pre: Fe deficency anemia, h/x of polyps  Post: fair prep, normal    ENDOSCOPY N/A 8/17/2017    Procedure: ESOPHAGOGASTRODUODENOSCOPY;  Surgeon: Porsha Ruby MD;  Location: [facility] ENDOSCOPY;  Service:     ENDOSCOPY N/A 12/22/2017    Procedure: ESOPHAGOGASTRODUODENOSCOPY WITH BIOPSIES;  Surgeon: Cleveland Devine MD;  Location: [facility] ENDOSCOPY;  Service:     ENDOSCOPY N/A 5/17/2021    Procedure: ESOPHAGOGASTRODUODENOSCOPY  with biopsies;  Surgeon: Cleveland Devine MD;  Location: [facility] ENDOSCOPY;  Service: Gastroenterology;  Laterality: N/A;  Pre: Fe deficency anemia, nausea, heme positive stool   Post: gastritis, sloughing of distal esophagus mucosa    GALLBLADDER SURGERY      HEMORRHOIDECTOMY      HYSTERECTOMY      KIDNEY SURGERY  04/22/2013    Stent placement    MAZE PROCEDURE      MITRAL VALVE REPLACEMENT      TONSILLECTOMY      TRICUSPID VALVE REPLACEMENT        Home Meds:  Medications Prior to Admission   Medication Sig Dispense Refill Last Dose    albuterol sulfate  (90 Base) MCG/ACT inhaler Inhale 2 puffs Every 4 (Four) Hours As Needed for Wheezing. 18 g 3     amLODIPine (NORVASC) 10 MG tablet TAKE 1 TABLET BY MOUTH EVERY DAY 90 tablet 1     ampicillin 2 g in sodium chloride 0.9 % 100  mL IVPB Infuse 2 g into a venous catheter Every 4 (Four) Hours for 225 doses. Indications: Bacteria in the Blood, Endocarditis       apixaban (ELIQUIS) 5 MG tablet tablet Take 1 tablet by mouth Every 12 (Twelve) Hours. Indications: Atrial Fibrillation 180 tablet 3     atorvastatin (LIPITOR) 40 MG tablet TAKE 1 TABLET BY MOUTH EVERY DAY 90 tablet 2     budesonide (PULMICORT) 0.5 MG/2ML nebulizer solution Take 2 mL by nebulization 2 (Two) Times a Day for 30 days. Indications: Chronic Obstructive Lung Disease, Chronic hypoxic respiratory failure 360 mL 3     carvedilol (COREG) 6.25 MG tablet TAKE 1 TABLET BY MOUTH TWICE A DAY WITH MEALS 180 tablet 2     cefTRIAXone 2,000 mg in sodium chloride 0.9 % 100 mL IVPB Infuse 2,000 mg into a venous catheter Every 12 (Twelve) Hours for 76 doses. Indications: Bacteria in the Blood, Endocarditis       cyclobenzaprine (FLEXERIL) 10 MG tablet TAKE 1 TABLET BY MOUTH EVERYDAY AT BEDTIME 90 tablet 3     docosanol (ABREVA) 10 % cream cream Apply 1 Application topically to the appropriate area as directed 5 (Five) Times a Day. 2 g 0     furosemide (LASIX) 40 MG tablet TAKE 1 TABLET BY MOUTH TWICE A  tablet 1     HYDROcodone-acetaminophen (NORCO) 7.5-325 MG per tablet Take 1 tablet by mouth Every 6 (Six) Hours As Needed for Moderate Pain (Chronic pain medicine for low back pain). 90 tablet 0     ipratropium-albuterol (DUO-NEB) 0.5-2.5 mg/3 ml nebulizer Take 3 mL by nebulization Every 4 (Four) Hours As Needed for Wheezing. 360 mL 3     lisinopril (PRINIVIL,ZESTRIL) 40 MG tablet Take 1 tablet by mouth Daily.       loperamide (IMODIUM) 2 MG capsule Take 1 capsule by mouth 4 (Four) Times a Day As Needed for Diarrhea. 20 capsule 0     loratadine (CLARITIN) 10 MG tablet Take 1 tablet by mouth 2 (two) times a day.       Multiple Vitamins-Minerals (MULTIVITAL) tablet Take 1 tablet by mouth Daily.       Nebulizer device 1 Units 4 (Four) Times a Day As Needed (Wheezing). J44.1 j20.8 1 each 0      O2 (OXYGEN) Inhale 2 L/min Continuous.       omeprazole (priLOSEC) 40 MG capsule TAKE 1 CAPSULE BY MOUTH EVERY DAY 90 capsule 1     ondansetron ODT (ZOFRAN-ODT) 4 MG disintegrating tablet Place 1 tablet on the tongue Every 8 (Eight) Hours As Needed for Nausea or Vomiting. 10 tablet 0     potassium chloride (K-DUR,KLOR-CON) 20 MEQ CR tablet 1 po tid 60 tablet 0     roflumilast (DALIRESP) 500 MCG tablet tablet Take 1 tablet by mouth Daily. 90 tablet 1     rOPINIRole (REQUIP) 2 MG tablet TAKE 1 TABLET BY MOUTH EVERY DAY AT NIGHT 90 tablet 2     sertraline (ZOLOFT) 100 MG tablet TAKE 1 TABLET BY MOUTH EVERY DAY 90 tablet 1     zolpidem (AMBIEN) 10 MG tablet Take 1 tablet by mouth At Night As Needed for Sleep. 30 tablet 3        Allergies:  Allergies   Allergen Reactions    Bupropion Itching    Cephalexin Itching     Tolerated piperacillin/tazobactam, ampicillin, cefdinir, and ceftriaxone    Metoprolol Itching     Immunizations:  Immunization History   Administered Date(s) Administered    COVID-19 (PFIZER) BIVALENT 12+YRS 03/24/2023    COVID-19 (PFIZER) Purple Cap Monovalent 03/04/2021, 03/25/2021, 11/19/2021    Covid-19 (Pfizer) Gray Cap Monovalent 06/17/2022    FLUAD TRI 65YR+ 10/31/2016, 09/01/2018, 09/09/2019    FluMist 2-49yrs 10/30/2015    Fluad Quad 65+ 12/12/2020, 01/09/2021    Fluzone High Dose =>65 Years ([facility] ONLY) 09/06/2017, 10/23/2018, 11/19/2021    Fluzone High-Dose 65+yrs 11/19/2021    Pneumococcal Conjugate 13-Valent (PCV13) 11/18/2016    Pneumococcal Polysaccharide (PPSV23) 10/30/2015    Tdap 11/18/2016     Social History:   Social History     Social History Narrative    Not on file     Social History     Socioeconomic History    Marital status:     Number of children: 2   Tobacco Use    Smoking status: Former     Current packs/day: 0.00     Average packs/day: 3.0 packs/day for 54.0 years (162.0 ttl pk-yrs)     Types: Cigarettes     Start date: 1955     Quit date: 2007     Years since  "quittin.2     Passive exposure: Past    Smokeless tobacco: Never    Tobacco comments:     caffeine - tea or mt dew    Vaping Use    Vaping status: Never Used   Substance and Sexual Activity    Alcohol use: No    Drug use: No    Sexual activity: Defer       Family History:  Family History   Adopted: Yes   Problem Relation Age of Onset    No Known Problems Mother     No Known Problems Father         Review of Systems  See history of present illness and past medical history.  Patient denies headache, dizziness, syncope, falls, trauma, change in vision, change in hearing, change in taste, changes in weight, changes in appetite, focal weakness, numbness, or paresthesia.  Patient denies chest pain, palpitations, dyspnea, orthopnea, PND, cough, sinus pressure, rhinorrhea, epistaxis, hemoptysis, nausea, vomiting,hematemesis, diarrhea, constipation or hematochezia.  Denies cold or heat intolerance, polydipsia, polyuria, polyphagia. Denies hematuria, pyuria, dysuria, hesitancy, frequency or urgency.      Objective:  T Max 24 hrs: Temp (24hrs), Av.9 °F (37.2 °C), Min:98.8 °F (37.1 °C), Max:99 °F (37.2 °C)    Vitals Ranges:   Temp:  [98.8 °F (37.1 °C)-99 °F (37.2 °C)] 99 °F (37.2 °C)  Heart Rate:  [144-147] 146  Resp:  [20] 20  BP: (124-145)/(72-96) 142/82      Exam:  /82   Pulse (!) 146   Temp 99 °F (37.2 °C) (Tympanic)   Resp 20   Ht 170.2 cm (67\")   Wt 59 kg (130 lb)   SpO2 95%   BMI 20.36 kg/m²     General Appearance:    Alert, cooperative, no distress, appears stated age   Head:    Normocephalic, without obvious abnormality, atraumatic   Eyes:    PERRL, conjunctivae/corneas clear, EOM's intact, both eyes   Ears:    Normal external ear canals, both ears   Nose:   Nares normal, septum midline, mucosa normal, no drainage    or sinus tenderness   Throat:   Lips, mucosa, and tongue normal   Neck:   Supple, symmetrical, trachea midline, no adenopathy;     thyroid:  no enlargement/tenderness/nodules; no " carotid    bruit or JVD   Back:     Symmetric, no curvature, ROM normal, no CVA tenderness   Lungs:     Decreased breath sounds bilaterally, respirations unlabored   Chest Wall:    No tenderness or deformity    Heart:    Regular rate and rhythm, S1 and S2 normal, no murmur, rub   or gallop   Abdomen:     Soft, nontender, bowel sounds active all four quadrants,     no masses, no hepatomegaly, no splenomegaly   Extremities:   Extremities normal, atraumatic, no cyanosis or edema                       .    Data Review:  Labs in chart were reviewed.  WBC   Date Value Ref Range Status   04/11/2024 15.16 (H) 3.40 - 10.80 10*3/mm3 Final     Hemoglobin   Date Value Ref Range Status   04/11/2024 7.2 (L) 12.0 - 15.9 g/dL Final     Hematocrit   Date Value Ref Range Status   04/11/2024 23.8 (L) 34.0 - 46.6 % Final     Platelets   Date Value Ref Range Status   04/11/2024 422 140 - 450 10*3/mm3 Final     Sodium   Date Value Ref Range Status   04/11/2024 144 136 - 145 mmol/L Final     Potassium   Date Value Ref Range Status   04/11/2024 3.1 (L) 3.5 - 5.2 mmol/L Final     Chloride   Date Value Ref Range Status   04/11/2024 99 98 - 107 mmol/L Final     CO2   Date Value Ref Range Status   04/11/2024 33.0 (H) 22.0 - 29.0 mmol/L Final     BUN   Date Value Ref Range Status   04/11/2024 9 8 - 23 mg/dL Final     Creatinine   Date Value Ref Range Status   04/11/2024 0.92 0.57 - 1.00 mg/dL Final     Glucose   Date Value Ref Range Status   04/11/2024 128 (H) 65 - 99 mg/dL Final     Calcium   Date Value Ref Range Status   04/11/2024 8.2 (L) 8.6 - 10.5 mg/dL Final                Imaging Results (All)       Procedure Component Value Units Date/Time    XR Chest 1 View [[id number]] Collected: 04/11/24 1900     Updated: 04/11/24 1907    Narrative:      Portable chest radiograph     HISTORY: Cough, fevers     TECHNIQUE: Single AP portable radiograph of the chest     COMPARISON: Chest radiograph 4/5/2024       Impression:      Findings  impression:  Background interstitial thickening is present throughout the bilateral  lungs with many areas demonstrate a somewhat nodular appearance which  cannot be evaluated on plain film radiographs, similar that seen on  prior radiographs, and best seen on CT chest 3/2/2024. Please refer to  this dictation for further formation and follow-up recommendations..  There is worsening pulmonary pacification within the right lung with  probable small bilateral pleural effusions. Cardiac silhouette is mildly  enlarged. Constellation findings are suggestive of worsening pulmonary  edema and/or multifocal pneumonia in the appropriate clinical context.  Correlation with patient history is recommended with follow-up chest CT  if clinically indicated. No pneumothorax is seen. There are median  sternotomy wires. A left PICC tip terminates over the expected location  of the superior vena cava.           This report was finalized on 4/11/2024 7:03 PM by Dr. Papo De La Torre M.D  on Workstation: [id number]                 Assessment:  Active Hospital Problems    Diagnosis  POA    **Sepsis [A41.9]  Yes      Resolved Hospital Problems   No resolved problems to display.   Pneumonia  Enterococcal bacteremia  A fib  Hypertension  Hyperlipidemia  Copd  Chronic diastolic chf  Acute on chronic hypoxic respiratory failure  Pulmonary hypertension  Anemia      Plan:  Ask id to see her  Add vancomycin  Ask pulmonary to see her  Wean oxygen back to home flow rate as tolerated  Monitor on telemetry  Trend labs  Dw patient and ed provider    Susanne Bello MD  4/11/2024  22:28 EDT

## 2024-04-12 NOTE — PLAN OF CARE
Goal Outcome Evaluation:  Plan of Care Reviewed With: patient, spouse           Outcome Evaluation: Pt is a 68 female who presents from home with sepsis, anemia, afib with RVR; PMH of COPD, endocarditis. Prior to admission, pt was living at home with spouse and independent with mobility using rwx. Spouse reports he also has motorized scooter for pt as needed. Pt wears 2-3 L home O2 via nc. Upon exam, pt c/o fatigue but agreeable to sitting EOB for strength asssessment. Pt performed bed mobility with min A. Upon sitting, pt with toileting needs and was assisted with transfer from bed to bsc with min A. Pt fatigued and SOA with activity and was assisted back to bed after toileting completed. Pt is at increased risk of falls and will continue to benefit from PT to address strength, balance, and endurance impairments. Rec DC home with HH PT pending progress.      Anticipated Discharge Disposition (PT): skilled nursing facility

## 2024-04-12 NOTE — NURSING NOTE
Instructed  not to take down antibiotic and tubing when it was finished. I explained that her nurse would change the tubing and that she would be getting intermittent infusions.

## 2024-04-12 NOTE — PROGRESS NOTES
"      [facility]         Dr Cronin Sayied   LOS: 1 day   Patient Care Team:  Javan Martinez MD as PCP - General (Internal Medicine)  Ag Melo Jr., MD as Consulting Physician (Pulmonary Disease)  Cleveland Devine MD as Consulting Physician (Gastroenterology)  Earnest Gan MD as Consulting Physician (Cardiology)  Daniela Shin MD PhD as Consulting Physician (Hematology and Oncology)    Chief Complaint: Sepsis with pneumonia versus CHF multiple issues as listed below    Interval History: She is currently requiring 4.5 L oxygen nasal cannula.  Status post diuresis feels minimally better.    REVIEW OF SYSTEMS:   CARDIOVASCULAR: No chest pain, chest pressure or chest discomfort. No palpitations or edema.   RESPIRATORY: Shortness of breath at rest worse with minimal activity.  GASTROINTESTINAL: No anorexia, nausea, vomiting or diarrhea. No abdominal pain or blood.   HEMATOLOGIC: No bleeding or bruising.     Ventilator/Non-Invasive Ventilation Settings (From admission, onward)      None              Vital Signs  Temp:  [98.1 °F (36.7 °C)-99 °F (37.2 °C)] 98.1 °F (36.7 °C)  Heart Rate:  [144-150] 150  Resp:  [20] 20  BP: (124-145)/(70-96) 126/85  No intake or output data in the 24 hours ending 04/12/24 1104  Flowsheet Rows      Flowsheet Row First Filed Value   Admission Height 170.2 cm (67\") Documented at 04/11/2024 1752   Admission Weight 59 kg (130 lb) Documented at 04/11/2024 1752            Physical Exam:  Patient is examined using the personal protective equipment as per guidelines from infection control for this particular patient as enacted.  Hand hygiene was performed before and after patient interaction.   General Appearance:    Alert, cooperative, in no acute distress.  Following simple commands  ENT Mallampati between 3 and 4 no nasal congestion  Neck midline trachea, no thyromegaly   Lungs:   Diminished breath sounds with crackles bilaterally in the bases    Heart:    Regular " rhythm and normal rate, normal S1 and S2, no            murmur, no gallop, no rub, no click   Chest Wall:    No abnormalities observed   Abdomen:     Normal bowel sounds, no masses, no organomegaly, soft        nontender, nondistended, no guarding, no rebound                tenderness   Extremities:   Moves all extremities well, no edema, no cyanosis, no             redness  CNS no focal neurological deficits normal sensory exam  Skin no rashes no nodules  Musculoskeletal no cyanosis no clubbing normal range of motion     Results Review:        Results from last 7 days   Lab Units 04/12/24  0533 04/11/24  1800 04/06/24  0427   SODIUM mmol/L 144 144 144   POTASSIUM mmol/L 3.6 3.1* 4.0   CHLORIDE mmol/L 102 99 104   CO2 mmol/L 33.4* 33.0* 32.7*   BUN mg/dL 8 9 6*   CREATININE mg/dL 0.82 0.92 0.73   GLUCOSE mg/dL 126* 128* 113*   CALCIUM mg/dL 7.9* 8.2* 8.6         Results from last 7 days   Lab Units 04/12/24  0534 04/11/24  1800   WBC 10*3/mm3 14.36* 15.16*   HEMOGLOBIN g/dL 6.9* 7.2*   HEMATOCRIT % 22.4* 23.8*   PLATELETS 10*3/mm3 384 422             Results from last 7 days   Lab Units 04/06/24  0427   MAGNESIUM mg/dL 2.0               I reviewed the patient's new clinical results.  I personally viewed and interpreted the patient's chest x-ray.        Medication Review:   amLODIPine, 10 mg, Oral, Daily  ampicillin, 2 g, Intravenous, Q4H  apixaban, 5 mg, Oral, Q12H  atorvastatin, 40 mg, Oral, Daily  budesonide, 0.5 mg, Nebulization, BID - RT  carvedilol, 6.25 mg, Oral, BID With Meals  cefTRIAXone (ROCEPHIN) 2,000 mg in sodium chloride 0.9 % 100 mL MBP, 2,000 mg, Intravenous, Q12H  cetirizine, 10 mg, Oral, Daily  cyclobenzaprine, 10 mg, Oral, Nightly  furosemide, 40 mg, Intravenous, Q12H  lisinopril, 40 mg, Oral, Daily  multivitamin with minerals, 1 tablet, Oral, Daily  pantoprazole, 40 mg, Oral, Q AM  potassium chloride, 20 mEq, Oral, BID With Meals  potassium chloride ER, 40 mEq, Oral, Q4H  roflumilast, 500 mcg,  Oral, Daily  rOPINIRole, 2 mg, Oral, Nightly  sertraline, 100 mg, Oral, Daily             ASSESSMENT:   Sepsis  Pneumonia  Enterococcal bacteremia  Atrial fibrillation  Hypertension  Hyperlipidemia  COPD  Chronic diastolic heart failure  Acute on chronic hypoxic respiratory failure  Pulmonary hypertension, severe  Anemia    PLAN:  Minimal clinical improvement with diuretics.  I will proceed with CT chest to evaluate for other etiology.  Awaiting cardiology input regarding possible heart failure  Current antibiotics per infectious diseases  Wean down oxygen maintain sats over 90%  Continue bronchodilators.  Some wheezing noted on exam.  I will add some steroids IV  Wean down oxygen maintain sats above 90%  Mobilize ambulate  Discussed plan of care with patient and son at bedside        Roseanne Tong MD  04/12/24  11:04 EDT

## 2024-04-12 NOTE — THERAPY EVALUATION
Patient Name: Paz Browne  : 1955    MRN: [id number]                              Today's Date: 2024       Admit Date: 2024    Visit Dx:     ICD-10-CM ICD-9-CM   1. Sepsis, due to unspecified organism, unspecified whether acute organ dysfunction present  A41.9 038.9     995.91   2. Acute on chronic congestive heart failure, unspecified heart failure type  I50.9 428.0   3. Atrial fibrillation with RVR  I48.91 427.31   4. History of atrial fibrillation  Z86.79 V12.59   5. Chronic anticoagulation  Z79.01 V58.61   6. Hypoxia  R09.02 799.02   7. History of COPD  Z87.09 V12.69   8. History of endocarditis  Z86.79 V12.59     Patient Active Problem List   Diagnosis    PAF (paroxysmal atrial fibrillation)    Hypertension    Hyperlipidemia    Pain medication agreement    Hiatal hernia    Primary osteoarthritis involving multiple joints    Pulmonary hypertension    S/P TVR (tricuspid valve repair)    Pulmonary nodule    Restless leg syndrome    Chronic low back pain    Dysplastic polyp of colon    History of mitral valve replacement with tissue graft    Chronic hypoxemic respiratory failure    Anemia    Celiac artery stenosis    Intertrigo    Abnormal EKG    Muscle spasms of both lower extremities    Iron deficiency anemia    Adenomatous polyp of colon    Gastroesophageal reflux disease without esophagitis    Headache    History of endocarditis    Pneumonia of both lower lobes due to infectious organism    Peptic ulcer disease    Peripheral vascular disease    Hyperlipidemia    Hyperglycemia    COPD with acute exacerbation    Chronic diastolic CHF (congestive heart failure)    Chronic bilateral low back pain    COPD exacerbation    Leukocytosis    Elevated troponin    Pneumonia    Acute-on-chronic respiratory failure    Septicemia due to enterococcus    Chronic heart failure with preserved ejection fraction (HFpEF)    Sepsis     Past Medical History:   Diagnosis Date    VAN (acute kidney injury)      Anemia     Asthma     Atrial flutter     cardioversion    Cataract     Celiac artery stenosis     Chronic respiratory failure with hypoxia     Colon polyp     COPD (chronic obstructive pulmonary disease)     GI bleed     Hiatal hernia     History of CHF (congestive heart failure)     due to MR    History of home oxygen therapy     3 lpm NC    History of mitral valve replacement with tissue graft     Hyperlipidemia     Hypertension     Infectious viral hepatitis     B    Intertrigo     Long term (current) use of anticoagulants     Mitral regurgitation     s/p tissue MVR    PAF (paroxysmal atrial fibrillation)     s/p MAZE    Pneumonia     Pulmonary hypertension     S/P TVR (tricuspid valve repair) 7/7/2016     Past Surgical History:   Procedure Laterality Date    CARDIAC CATHETERIZATION  09/01/2014    Right dominant systemt, normal coronary arteries.     CARDIAC CATHETERIZATION Left 6/10/2016    Procedure: Cardiac catheterization;  Surgeon: Sergei Hall MD;  Location: [facility] CATH INVASIVE LOCATION;  Service:     CARDIAC CATHETERIZATION N/A 6/10/2016    Procedure: Right Heart Cath;  Surgeon: Sergei Hall MD;  Location: [facility] CATH INVASIVE LOCATION;  Service:     CATARACT EXTRACTION      COLONOSCOPY      COLONOSCOPY N/A 8/4/2017    Procedure: COLONOSCOPY TO CECUM/TI WITH POLYPECTOMY ( COLD BX);  Surgeon: Cleveland Devine MD;  Location: [facility] ENDOSCOPY;  Service:     COLONOSCOPY N/A 8/10/2017    Procedure: COLONOSCOPY to cecum and TI with 2 clips placed at transverse;  Surgeon: Earnest PALOMO MD;  Location: [facility] ENDOSCOPY;  Service:     COLONOSCOPY N/A 12/22/2017    Procedure: COLONOSCOPY INTO CECUM WITH COLD POLYPECTOMIES;  Surgeon: Cleveland Devine MD;  Location: [facility] ENDOSCOPY;  Service:     COLONOSCOPY N/A 5/17/2021    Procedure: COLONOSCOPY to cecum;  Surgeon: Cleveland Devine MD;  Location: [facility] ENDOSCOPY;  Service: Gastroenterology;  Laterality: N/A;  Pre: Fe deficency anemia, h/x of polyps  Post: fair prep,  normal    ENDOSCOPY N/A 8/17/2017    Procedure: ESOPHAGOGASTRODUODENOSCOPY;  Surgeon: Porsha Ruby MD;  Location: [facility] ENDOSCOPY;  Service:     ENDOSCOPY N/A 12/22/2017    Procedure: ESOPHAGOGASTRODUODENOSCOPY WITH BIOPSIES;  Surgeon: Cleveland Devine MD;  Location: [facility] ENDOSCOPY;  Service:     ENDOSCOPY N/A 5/17/2021    Procedure: ESOPHAGOGASTRODUODENOSCOPY  with biopsies;  Surgeon: Cleveland Devine MD;  Location: [facility] ENDOSCOPY;  Service: Gastroenterology;  Laterality: N/A;  Pre: Fe deficency anemia, nausea, heme positive stool   Post: gastritis, sloughing of distal esophagus mucosa    GALLBLADDER SURGERY      HEMORRHOIDECTOMY      HYSTERECTOMY      KIDNEY SURGERY  04/22/2013    Stent placement    MAZE PROCEDURE      MITRAL VALVE REPLACEMENT      TONSILLECTOMY      TRICUSPID VALVE REPLACEMENT        General Information       Row Name 04/12/24 1407          Physical Therapy Time and Intention    Document Type evaluation  -EE     Mode of Treatment individual therapy;physical therapy  -EE       Row Name 04/12/24 1407          General Information    Patient Profile Reviewed yes  -EE     Prior Level of Function independent:;all household mobility  rwx at home  -EE     Existing Precautions/Restrictions fall;oxygen therapy device and L/min  -EE     Barriers to Rehab medically complex  -EE       Row Name 04/12/24 1407          Living Environment    People in Home spouse  -EE       Row Name 04/12/24 1407          Home Main Entrance    Number of Stairs, Main Entrance none  ramp  -EE       Row Name 04/12/24 1407          Cognition    Orientation Status (Cognition) oriented x 3  -EE       Row Name 04/12/24 1407          Safety Issues, Functional Mobility    Impairments Affecting Function (Mobility) balance;endurance/activity tolerance;strength;shortness of breath  -EE               User Key  (r) = Recorded By, (t) = Taken By, (c) = Cosigned By      Initials Name Provider Type    EE Brianna Dejesus, PT Physical Therapist                    Mobility       Row Name 04/12/24 1408          Bed Mobility    Bed Mobility supine-sit;sit-supine  -EE     Supine-Sit Isabel (Bed Mobility) standby assist  -EE     Sit-Supine Isabel (Bed Mobility) minimum assist (75% patient effort)  -EE     Assistive Device (Bed Mobility) head of bed elevated;bed rails  -EE       Row Name 04/12/24 1408          Transfers    Comment, (Transfers) Pt fatigued sitting EOB; initally planned to defer further mobility but pt with urgent toileting needs upon sitting EOB. Pt assisted to BSC then back to bed to rest. RN notified.  -EE       Row Name 04/12/24 1408          Bed-Chair Transfer    Bed-Chair Isabel (Transfers) minimum assist (75% patient effort);verbal cues  -EE     Comment, (Bed-Chair Transfer) stand pivot transfer bed <> bsc  -EE       Row Name 04/12/24 1408          Sit-Stand Transfer    Sit-Stand Isabel (Transfers) minimum assist (75% patient effort);verbal cues  -EE       Row Name 04/12/24 1408          Gait/Stairs (Locomotion)    Isabel Level (Gait) not tested  -EE               User Key  (r) = Recorded By, (t) = Taken By, (c) = Cosigned By      Initials Name Provider Type    Brianna Dia PT Physical Therapist                   Obj/Interventions       Row Name 04/12/24 1414          Range of Motion Comprehensive    General Range of Motion bilateral lower extremity ROM WFL  -EE       Row Name 04/12/24 1414          Strength Comprehensive (MMT)    General Manual Muscle Testing (MMT) Assessment lower extremity strength deficits identified  -EE     Comment, General Manual Muscle Testing (MMT) Assessment generalized weakness noted; B LEs grossly 3/5  -EE       Row Name 04/12/24 1414          Balance    Balance Assessment sitting static balance;standing static balance  -EE     Static Sitting Balance standby assist  -EE     Position, Sitting Balance unsupported;sitting edge of bed  -EE     Static Standing Balance contact  guard;minimal assist  -EE     Position/Device Used, Standing Balance supported  -EE       Row Name 04/12/24 1414          Sensory Assessment (Somatosensory)    Sensory Assessment (Somatosensory) not tested  -EE               User Key  (r) = Recorded By, (t) = Taken By, (c) = Cosigned By      Initials Name Provider Type    EE Brianna Dejesus, PT Physical Therapist                   Goals/Plan       Row Name 04/12/24 1419          Bed Mobility Goal 1 (PT)    Activity/Assistive Device (Bed Mobility Goal 1, PT) sit to supine/supine to sit  -EE     [place] Level/Cues Needed (Bed Mobility Goal 1, PT) modified independence  -EE     Time Frame (Bed Mobility Goal 1, PT) 2 weeks;long term goal (LTG)  -EE     Progress/Outcomes (Bed Mobility Goal 1, PT) goal ongoing  -EE       Row Name 04/12/24 1419          Transfer Goal 1 (PT)    Activity/Assistive Device (Transfer Goal 1, PT) sit-to-stand/stand-to-sit;bed-to-chair/chair-to-bed;walker, rolling  -EE     [place] Level/Cues Needed (Transfer Goal 1, PT) standby assist  -EE     Time Frame (Transfer Goal 1, PT) 2 weeks;long term goal (LTG)  -EE     Progress/Outcome (Transfer Goal 1, PT) goal ongoing  -EE       Row Name 04/12/24 1419          Gait Training Goal 1 (PT)    Activity/Assistive Device (Gait Training Goal 1, PT) gait (walking locomotion);walker, rolling  -EE     [place] Level (Gait Training Goal 1, PT) standby assist  -EE     Distance (Gait Training Goal 1, PT) 100'  -EE     Time Frame (Gait Training Goal 1, PT) 2 weeks;long term goal (LTG)  -EE     Progress/Outcome (Gait Training Goal 1, PT) goal ongoing  -EE       Row Name 04/12/24 1419          Therapy Assessment/Plan (PT)    Planned Therapy Interventions (PT) balance training;bed mobility training;gait training;home exercise program;strengthening;patient/family education;transfer training  -EE               User Key  (r) = Recorded By, (t) = Taken By, (c) = Cosigned By      Initials Name Provider Type     EE Brianna Dejesus, PT Physical Therapist                   Clinical Impression       Row Name 04/12/24 1414          Pain    Pretreatment Pain Rating 0/10 - no pain  -EE     Posttreatment Pain Rating 0/10 - no pain  -EE       Row Name 04/12/24 1414          Plan of Care Review    Plan of Care Reviewed With patient;spouse  -EE     Outcome Evaluation Pt is a 68 female who presents from home with sepsis, anemia, afib with RVR; PMH of COPD, endocarditis. Prior to admission, pt was living at home with spouse and independent with mobility using rwx. Spouse reports he also has motorized scooter for pt as needed. Pt wears 2-3 L home O2 via nc. Upon exam, pt c/o fatigue but agreeable to sitting EOB for strength asssessment. Pt performed bed mobility with min A. Upon sitting, pt with toileting needs and was assisted with transfer from bed to c with min A. Pt fatigued and SOA with activity and was assisted back to bed after toileting completed. Pt is at increased risk of falls and will continue to benefit from PT to address strength, balance, and endurance impairments. Rec DC home with HH PT pending progress.  -EE       Row Name 04/12/24 1414          Therapy Assessment/Plan (PT)    Rehab Potential (PT) fair, will monitor progress closely  -EE     Criteria for Skilled Interventions Met (PT) yes;meets criteria;skilled treatment is necessary  -EE     Therapy Frequency (PT) 5 times/wk  -EE       Row Name 04/12/24 1414          Vital Signs    Pre SpO2 (%) 94  -EE     O2 Delivery Pre Treatment supplemental O2  -EE     O2 Delivery Intra Treatment supplemental O2  -EE     Post SpO2 (%) 93  -EE     O2 Delivery Post Treatment supplemental O2  -EE     Pre Patient Position Supine  -EE     Intra Patient Position Standing  -EE     Post Patient Position Supine  -EE       Row Name 04/12/24 1414          Positioning and Restraints    Pre-Treatment Position in bed  -EE     Post Treatment Position bed  -EE     In Bed fowlers;call light within  reach;encouraged to call for assist;exit alarm on;notified nsg;with family/caregiver  -EE               User Key  (r) = Recorded By, (t) = Taken By, (c) = Cosigned By      Initials Name Provider Type    EE Brianna Dejesus PT Physical Therapist                   Outcome Measures       Row Name 04/12/24 1421          How much help from another person do you currently need...    Turning from your back to your side while in flat bed without using bedrails? 3  -EE     Moving from lying on back to sitting on the side of a flat bed without bedrails? 2  -EE     Moving to and from a bed to a chair (including a wheelchair)? 3  -EE     Standing up from a chair using your arms (e.g., wheelchair, bedside chair)? 3  -EE     Climbing 3-5 steps with a railing? 1  -EE     To walk in hospital room? 2  -EE     AM-PAC 6 Clicks Score (PT) 14  -EE     Highest Level of Mobility Goal 4 --> Transfer to chair/commode  -EE               User Key  (r) = Recorded By, (t) = Taken By, (c) = Cosigned By      Initials Name Provider Type    Brianna Dia PT Physical Therapist                                 Physical Therapy Education       Title: PT OT SLP Therapies (In Progress)       Topic: Physical Therapy (In Progress)       Point: Mobility training (In Progress)       Learning Progress Summary             Patient Acceptance, E,D, NR by  at 4/12/2024 1421                         Point: Home exercise program (Not Started)       Learner Progress:  Not documented in this visit.              Point: Body mechanics (Not Started)       Learner Progress:  Not documented in this visit.              Point: Precautions (Not Started)       Learner Progress:  Not documented in this visit.                              User Key       Initials Effective Dates Name Provider Type Discipline     06/16/21 -  Brianna Dejesus PT Physical Therapist PT                  PT Recommendation and Plan  Planned Therapy Interventions (PT): balance training, bed mobility  training, gait training, home exercise program, strengthening, patient/family education, transfer training  Plan of Care Reviewed With: patient, spouse  Outcome Evaluation: Pt is a 68 female who presents from home with sepsis, anemia, afib with RVR; PMH of COPD, endocarditis. Prior to admission, pt was living at home with spouse and independent with mobility using rwx. Spouse reports he also has motorized scooter for pt as needed. Pt wears 2-3 L home O2 via nc. Upon exam, pt c/o fatigue but agreeable to sitting EOB for strength asssessment. Pt performed bed mobility with min A. Upon sitting, pt with toileting needs and was assisted with transfer from bed to bsc with min A. Pt fatigued and SOA with activity and was assisted back to bed after toileting completed. Pt is at increased risk of falls and will continue to benefit from PT to address strength, balance, and endurance impairments. Rec DC home with HH PT pending progress.     Time Calculation:         PT Charges       Row Name 04/12/24 1422 04/12/24 1327          Time Calculation    Start Time 1348  -EE --     Stop Time 1408  -EE --     Time Calculation (min) 20 min  -EE --     PT Received On 04/12/24  -EE --     PT - Next Appointment 04/14/24  -EE 04/13/24  -EE     PT Goal Re-Cert Due Date 04/26/24  -EE --        Time Calculation- PT    Total Timed Code Minutes- PT 10 minute(s)  -EE --        Timed Charges    13811 - PT Therapeutic Activity Minutes 10  -EE --        Total Minutes    Timed Charges Total Minutes 10  -EE --      Total Minutes 10  -EE --               User Key  (r) = Recorded By, (t) = Taken By, (c) = Cosigned By      Initials Name Provider Type    EE Brianna Dejesus, PT Physical Therapist                  Therapy Charges for Today       Code Description Service Date Service Provider Modifiers Qty    96286770575 HC PT THERAPEUTIC ACT EA 15 MIN 4/12/2024 Brianna Dejesus, PT GP 1    06382545489 HC PT EVAL MOD COMPLEXITY 2 4/12/2024 Brianna Dejesus, PT GP 1             PT G-Codes  AM-PAC 6 Clicks Score (PT): 14  PT Discharge Summary  Anticipated Discharge Disposition (PT): skilled nursing facility    Brianna Dejesus, PT  4/12/2024

## 2024-04-12 NOTE — OUTREACH NOTE
COVID-19 Week 2 Survey      Flowsheet Row Responses   [facility] patient discharged from? [place]   Does the patient have one of the following disease processes/diagnoses(primary or secondary)? Pneumonia   Revoke Readmitted            MARIAMA DOMINGUEZ - Registered Nurse

## 2024-04-12 NOTE — PROGRESS NOTES
"[facility] Clinical Pharmacy Services: Vancomycin Pharmacokinetic Initial Consult Note    Paz Browne is a 68 y.o. female who is on day 1 of pharmacy to dose vancomycin.    Indication: Sepsis  Consulting Provider: Dr Bello  Planned Duration of Therapy: 5 days  Loading Dose Ordered or Given: 1250 mg on 04/11/24 at 2055  Target: -600 mg/L.hr     Other Antimicrobials: Ampicillin, Ceftriaxone    Vitals/Labs  Ht: 170.2 cm (67\"); Wt: 62.4 kg (137 lb 9.1 oz)  Temp Readings from Last 1 Encounters:   04/11/24 98.1 °F (36.7 °C) (Oral)    Estimated Creatinine Clearance: 56.1 mL/min (by C-G formula based on SCr of 0.92 mg/dL).        Results from last 7 days   Lab Units 04/11/24  1800 04/06/24  0427 04/05/24  0526   CREATININE mg/dL 0.92 0.73 0.77   WBC 10*3/mm3 15.16*  --  11.32*     Assessment/Plan:    Vancomycin Dose:   1000 mg IV every  24  hours  Predictive AUC level for the dose ordered is 422 mg/L.hr, which is within the target of 400-600 mg/L.hr  Will defer ordering a Vanc Trough to Clinical Pharmacist on day shift.     Pharmacy will follow patient's kidney function and will adjust doses and obtain levels as necessary. Thank you for involving pharmacy in this patient's care. Please contact pharmacy with any questions or concerns.                           Chris Herrera, [facility]  Clinical Pharmacist    "

## 2024-04-12 NOTE — NURSING NOTE
Noted Pt's heart rates running high 149 to 155, v/s 126/85, Pt. No chest pain, alert, confused at times, [facility] called , new order noted for tachycardia,

## 2024-04-12 NOTE — CASE MANAGEMENT/SOCIAL WORK
Continued Stay Note  [facility]     Patient Name: Paz Browne  MRN: [id number]  Today's Date: 4/12/2024    Admit Date: 4/11/2024    Plan: Plan home with family.   CRUZ Ashley RN   Discharge Plan       Row Name 04/12/24 0939       Plan    Plan Plan home with family.   CRUZ Ashley RN    Patient/Family in Agreement with Plan yes    Plan Comments FACE SHEET VERIFIED/ IM LETTER SIGNED.  Spoke with pt and pt's spouse ( [place]) at bedside..  Pt's PCP is Dr. Javan Martinez. Pt lives with her spouse Chapincito Browne ([phone]) and brother in law ( [place]) in a mobile home.  Pt is independent with ADLs. Pt has home O2 provided by [employer].  Pt also has a nebulizer, shower chair, rolling walker, wheelchair, and electric scooter for home use if needed. Pt gets her prescriptions at [facility].  Pt denies any issues affording medications. Pt is not current with . Pt has not been in SNF. Pt denies any discharge needs. Pt's spouse will assist pt at home and transport pt home.  Plan home with family.  CRUZ Ashley RN                   Discharge Codes    No documentation.                       Amada Ashley RN

## 2024-04-12 NOTE — SIGNIFICANT NOTE
04/12/24 1327   OTHER   Discipline physical therapist   Rehab Time/Intention   Session Not Performed other (see comments)  (Hgb 6.9; will follow up tomorrow for PT eval as appropriate.)   Recommendation   PT - Next Appointment 04/13/24

## 2024-04-12 NOTE — CONSULTS
Group: [facility]         CONSULT NOTE    Patient Identification:  Paz Browne  68 y.o.  female  1955  [id number]            Requesting physician: Dr. Bello    Reason for Consultation: Acute on chronic respiratory failure    CC: Shortness of breath, fever, malaise    History of Present Illness:  Paz Browne is a 68-year-old female with a past medical history of COPD, chronic hypoxic respiratory failure (2 L nasal cannula), heart failure with preserved ejection fraction, hyperlipidemia, hypertension, bioprosthetic MVR, and atrial fibrillation chronic anticoagulation with apixaban.    Patient was recently admitted from 3/31 to 4/6 with diagnosis Enterococcus septicemia, possible SBE, pneumonia, COPD exacerbation.  She presented to the emergency department originally with shortness of air, culture was positive for Enterococcus-infectious disease was consulted patient was started on ampicillin and ceftriaxone as SBE was admitted without.  Patient underwent PICC line placement and was discharged on outpatient treatment with long-term antibiotics.    She returned to the emergency department on 4/11/2024 with complaints of increased shortness of breath, fevers.  Patient reports that she has been on antibiotics for about 12 days and has noticed progressively worsening shortness of breath, exercise intolerance, and hypoxia with minimal movement.  She also reports bilateral lower extremity swelling.  Upon arrival to the emergency department, patient was tachycardic with a heart rate of 145, and SpO2 of 90% on 3 L nasal cannula.  ED evaluation included: proBNP 4313, potassium 3.1, Lactate 0.9, procalcitonin 0.06, WBC 15.16 with 78.5% neutrophils (previously 11.32 seven days prior), hemoglobin 7.2.  Respiratory viral panel was negative.  Chest x-ray was reviewed showing worsening pulmonary opacification from the right lung with probable small bilateral pleural effusions.    Patient received  ceftriaxone, vancomycin and 40 mg of IV Lasix in the emergency department.  She is admitted for sepsis.  We have been asked to see patient for acute on chronic respiratory failure.      Review of Systems:  CONSTITUTIONAL: Positive for fever and chills, malaise  EYE:  No new vision changes  EAR:  No change in hearing  CARDIAC:  No chest pain  PULMONARY: Positive for cough, shortness of breath, wheezing   GI:  No diarrhea, hematemesis or hematochezia  RENAL:  No dysuria or urinary frequency  MUSCULOSKELETAL:  No musculoskeletal complaints  ENDOCRINE:  No heat or cold intolerance  INTEGUMENTARY: No skin rashes  NEUROLOGICAL:  No dizziness or confusion.  No seizure activity  PSYCHIATRIC:  No new anxiety or depression  12 system review of systems performed and all else negative     Past Medical History:  Past Medical History:   Diagnosis Date    VAN (acute kidney injury)     Anemia     Asthma     Atrial flutter     cardioversion    Cataract     Celiac artery stenosis     Chronic respiratory failure with hypoxia     Colon polyp     COPD (chronic obstructive pulmonary disease)     GI bleed     Hiatal hernia     History of CHF (congestive heart failure)     due to MR    History of home oxygen therapy     3 lpm NC    History of mitral valve replacement with tissue graft     Hyperlipidemia     Hypertension     Infectious viral hepatitis     B    Intertrigo     Long term (current) use of anticoagulants     Mitral regurgitation     s/p tissue MVR    PAF (paroxysmal atrial fibrillation)     s/p MAZE    Pneumonia     Pulmonary hypertension     S/P TVR (tricuspid valve repair) 7/7/2016       Past Surgical History:  Past Surgical History:   Procedure Laterality Date    CARDIAC CATHETERIZATION  09/01/2014    Right dominant systemt, normal coronary arteries.     CARDIAC CATHETERIZATION Left 6/10/2016    Procedure: Cardiac catheterization;  Surgeon: Sergei Hall MD;  Location: [facility] INVASIVE LOCATION;  Service:     CARDIAC  CATHETERIZATION N/A 6/10/2016    Procedure: Right Heart Cath;  Surgeon: Sergei aHll MD;  Location: [facility] CATH INVASIVE LOCATION;  Service:     CATARACT EXTRACTION      COLONOSCOPY      COLONOSCOPY N/A 8/4/2017    Procedure: COLONOSCOPY TO CECUM/TI WITH POLYPECTOMY ( COLD BX);  Surgeon: Cleveland Devine MD;  Location: [facility] ENDOSCOPY;  Service:     COLONOSCOPY N/A 8/10/2017    Procedure: COLONOSCOPY to cecum and TI with 2 clips placed at transverse;  Surgeon: Earnest PALOMO MD;  Location: [facility] ENDOSCOPY;  Service:     COLONOSCOPY N/A 12/22/2017    Procedure: COLONOSCOPY INTO CECUM WITH COLD POLYPECTOMIES;  Surgeon: Cleveland Devine MD;  Location: [facility] ENDOSCOPY;  Service:     COLONOSCOPY N/A 5/17/2021    Procedure: COLONOSCOPY to cecum;  Surgeon: Cleveland Devine MD;  Location: [facility] ENDOSCOPY;  Service: Gastroenterology;  Laterality: N/A;  Pre: Fe deficency anemia, h/x of polyps  Post: fair prep, normal    ENDOSCOPY N/A 8/17/2017    Procedure: ESOPHAGOGASTRODUODENOSCOPY;  Surgeon: Porsha Ruby MD;  Location: [facility] ENDOSCOPY;  Service:     ENDOSCOPY N/A 12/22/2017    Procedure: ESOPHAGOGASTRODUODENOSCOPY WITH BIOPSIES;  Surgeon: Cleveland Devine MD;  Location: [facility] ENDOSCOPY;  Service:     ENDOSCOPY N/A 5/17/2021    Procedure: ESOPHAGOGASTRODUODENOSCOPY  with biopsies;  Surgeon: Clevelnad Devine MD;  Location: [facility] ENDOSCOPY;  Service: Gastroenterology;  Laterality: N/A;  Pre: Fe deficency anemia, nausea, heme positive stool   Post: gastritis, sloughing of distal esophagus mucosa    GALLBLADDER SURGERY      HEMORRHOIDECTOMY      HYSTERECTOMY      KIDNEY SURGERY  04/22/2013    Stent placement    MAZE PROCEDURE      MITRAL VALVE REPLACEMENT      TONSILLECTOMY      TRICUSPID VALVE REPLACEMENT          Home Meds:  Medications Prior to Admission   Medication Sig Dispense Refill Last Dose    albuterol sulfate  (90 Base) MCG/ACT inhaler Inhale 2 puffs Every 4 (Four) Hours As Needed for Wheezing. 18 g 3      amLODIPine (NORVASC) 10 MG tablet TAKE 1 TABLET BY MOUTH EVERY DAY 90 tablet 1     ampicillin 2 g in sodium chloride 0.9 % 100 mL IVPB Infuse 2 g into a venous catheter Every 4 (Four) Hours for 225 doses. Indications: Bacteria in the Blood, Endocarditis       apixaban (ELIQUIS) 5 MG tablet tablet Take 1 tablet by mouth Every 12 (Twelve) Hours. Indications: Atrial Fibrillation 180 tablet 3     atorvastatin (LIPITOR) 40 MG tablet TAKE 1 TABLET BY MOUTH EVERY DAY 90 tablet 2     budesonide (PULMICORT) 0.5 MG/2ML nebulizer solution Take 2 mL by nebulization 2 (Two) Times a Day for 30 days. Indications: Chronic Obstructive Lung Disease, Chronic hypoxic respiratory failure 360 mL 3     carvedilol (COREG) 6.25 MG tablet TAKE 1 TABLET BY MOUTH TWICE A DAY WITH MEALS 180 tablet 2     cefTRIAXone 2,000 mg in sodium chloride 0.9 % 100 mL IVPB Infuse 2,000 mg into a venous catheter Every 12 (Twelve) Hours for 76 doses. Indications: Bacteria in the Blood, Endocarditis       cyclobenzaprine (FLEXERIL) 10 MG tablet TAKE 1 TABLET BY MOUTH EVERYDAY AT BEDTIME 90 tablet 3     docosanol (ABREVA) 10 % cream cream Apply 1 Application topically to the appropriate area as directed 5 (Five) Times a Day. 2 g 0     furosemide (LASIX) 40 MG tablet TAKE 1 TABLET BY MOUTH TWICE A  tablet 1     HYDROcodone-acetaminophen (NORCO) 7.5-325 MG per tablet Take 1 tablet by mouth Every 6 (Six) Hours As Needed for Moderate Pain (Chronic pain medicine for low back pain). 90 tablet 0     ipratropium-albuterol (DUO-NEB) 0.5-2.5 mg/3 ml nebulizer Take 3 mL by nebulization Every 4 (Four) Hours As Needed for Wheezing. 360 mL 3     lisinopril (PRINIVIL,ZESTRIL) 40 MG tablet Take 1 tablet by mouth Daily.       loperamide (IMODIUM) 2 MG capsule Take 1 capsule by mouth 4 (Four) Times a Day As Needed for Diarrhea. 20 capsule 0     loratadine (CLARITIN) 10 MG tablet Take 1 tablet by mouth 2 (two) times a day.       Multiple Vitamins-Minerals (MULTIVITAL)  tablet Take 1 tablet by mouth Daily.       Nebulizer device 1 Units 4 (Four) Times a Day As Needed (Wheezing). J44.1 j20.8 1 each 0     O2 (OXYGEN) Inhale 2 L/min Continuous.       omeprazole (priLOSEC) 40 MG capsule TAKE 1 CAPSULE BY MOUTH EVERY DAY 90 capsule 1     ondansetron ODT (ZOFRAN-ODT) 4 MG disintegrating tablet Place 1 tablet on the tongue Every 8 (Eight) Hours As Needed for Nausea or Vomiting. 10 tablet 0     potassium chloride (K-DUR,KLOR-CON) 20 MEQ CR tablet 1 po tid 60 tablet 0     roflumilast (DALIRESP) 500 MCG tablet tablet Take 1 tablet by mouth Daily. 90 tablet 1     rOPINIRole (REQUIP) 2 MG tablet TAKE 1 TABLET BY MOUTH EVERY DAY AT NIGHT 90 tablet 2     sertraline (ZOLOFT) 100 MG tablet TAKE 1 TABLET BY MOUTH EVERY DAY 90 tablet 1     zolpidem (AMBIEN) 10 MG tablet Take 1 tablet by mouth At Night As Needed for Sleep. 30 tablet 3        Allergies:  Allergies   Allergen Reactions    Bupropion Itching    Cephalexin Itching     Tolerated piperacillin/tazobactam, ampicillin, cefdinir, and ceftriaxone    Metoprolol Itching       Social History:   Social History     Socioeconomic History    Marital status:     Number of children: 2   Tobacco Use    Smoking status: Former     Current packs/day: 0.00     Average packs/day: 3.0 packs/day for 54.0 years (162.0 ttl pk-yrs)     Types: Cigarettes     Start date:      Quit date:      Years since quittin.2     Passive exposure: Past    Smokeless tobacco: Never    Tobacco comments:     caffeine - tea or mt dew    Vaping Use    Vaping status: Never Used   Substance and Sexual Activity    Alcohol use: No    Drug use: No    Sexual activity: Defer       Family History:  Family History   Adopted: Yes   Problem Relation Age of Onset    No Known Problems Mother     No Known Problems Father        Physical Exam:  /70 (BP Location: Right arm, Patient Position: Sitting)   Pulse (!) 150   Temp 98.1 °F (36.7 °C) (Oral)   Resp 20   Ht 170.2 cm  "(67\")   Wt 62.4 kg (137 lb 9.1 oz)   SpO2 96%   BMI 21.55 kg/m²  Body mass index is 21.55 kg/m². 96% 62.4 kg (137 lb 9.1 oz)    Constitutional: Middle aged, chronically ill-appearing female pt in bed, No acute respiratory distress, no accessory muscle use  Head: - NCAT  Eyes: No pallor, Anicteric conjunctiva, EOMI.  ENMT:  Mallampati 3, no oral thrush. Dry MM.   NECK: Trachea midline, No thyromegaly, no palpable cervical lymphadenopathy  Heart: Tachycardic rate, irregular rhythm, no murmur. No pedal edema   Lungs: RAKESH +, diminished, mild expiratory wheezing  Abdomen: Soft. No tenderness, guarding or rigidity. No palpable masses  Extremities: Extremities warm and well perfused. No cyanosis/ clubbing  Neuro: Conscious, answers appropriately, no gross focal neuro deficits  Psych: Mood and affect appropriate    PPE recommended per [facility] infectious disease Isolation protocol for the current clinical scenario(as mentioned below) was followed.      LABS:  COVID19   Date Value Ref Range Status   04/11/2024 Not Detected Not Detected - Ref. Range Final       Lab Results   Component Value Date    CALCIUM 8.2 (L) 04/11/2024    PHOS 3.0 04/06/2024     Results from last 7 days   Lab Units 04/11/24  1800 04/06/24  0427 04/05/24  1910 04/05/24  0526   MAGNESIUM mg/dL  --  2.0  --   --    SODIUM mmol/L 144 144  --  142   POTASSIUM mmol/L 3.1* 4.0 4.0 3.3*   CHLORIDE mmol/L 99 104  --  101   CO2 mmol/L 33.0* 32.7*  --  30.4*   BUN mg/dL 9 6*  --  8   CREATININE mg/dL 0.92 0.73  --  0.77   GLUCOSE mg/dL 128* 113*  --  124*   CALCIUM mg/dL 8.2* 8.6  --  8.6   WBC 10*3/mm3 15.16*  --   --  11.32*   HEMOGLOBIN g/dL 7.2*  --   --  7.4*   PLATELETS 10*3/mm3 422  --   --  229   ALT (SGPT) U/L  --  13  --  14   AST (SGOT) U/L  --  18  --  17   PROBNP pg/mL 4,313.0*  --   --   --    PROCALCITONIN ng/mL 0.06  --   --   --      Lab Results   Component Value Date    TROPONINT 23 (H) 03/31/2024             Results from last 7 days "   Lab Units 04/11/24  1938 04/11/24  1800   PROCALCITONIN ng/mL  --  0.06   LACTATE mmol/L 0.9  --          Results from last 7 days   Lab Units 04/11/24  1801   ADENOVIRUS DETECTION BY PCR  Not Detected   CORONAVIRUS 229E  Not Detected   CORONAVIRUS HKU1  Not Detected   CORONAVIRUS NL63  Not Detected   CORONAVIRUS OC43  Not Detected   HUMAN METAPNEUMOVIRUS  Not Detected   HUMAN RHINOVIRUS/ENTEROVIRUS  Not Detected   INFLUENZA B PCR  Not Detected   PARAINFLUENZA 1  Not Detected   PARAINFLUENZA VIRUS 2  Not Detected   PARAINFLUENZA VIRUS 3  Not Detected   PARAINFLUENZA VIRUS 4  Not Detected   BORDETELLA PERTUSSIS PCR  Not Detected   BORDETELLA PARAPERTUSSIS PCR  Not Detected   CHLAMYDOPHILA PNEUMONIAE PCR  Not Detected   MYCOPLAMA PNEUMO PCR  Not Detected   RSV, PCR  Not Detected             Lab Results   Component Value Date    TSH 0.512 10/23/2018     Estimated Creatinine Clearance: 56.1 mL/min (by C-G formula based on SCr of 0.92 mg/dL).         Imaging: I personally visualized the images of scans/x-rays performed within last 3 days.      Assessment:  Sepsis  Pneumonia  Enterococcal bacteremia  Atrial fibrillation  Hypertension  Hyperlipidemia  COPD  Chronic diastolic heart failure  Acute on chronic hypoxic respiratory failure  Pulmonary hypertension, severe  Anemia    Recommendations:  Chest x-ray from 4/11 reviewed and compared to prior imaging from 4/5-worsening right-sided infiltrates, and possible development of lateral pleural effusion with costophrenic blunting noted.  Patient received 40 mg of IV Lasix in the emergency department.  Given concern for worsening infection, agree with expanding antibiotics to include vancomycin and ceftriaxone.  Noted plan for infectious disease consultation, defer further antibiotics to their expertise.  Blood cultures already drawn and pending, respiratory culture added.  Respiratory viral panel was negative.  COPD-patient denies any recent wheezing, continue bronchodilator  therapy-will hold off on steroids for now.  Continue supplemental oxygen to maintain SpO2 88 to 92%.  Prior to most recent hospitalization, patient was on 2 L nasal cannula at baseline, currently requiring 3 L at baseline to maintain oxygen saturation.  Wean as tolerated.      Patient was placed in face mask upon entering room and kept mask on throughout our encounter. I wore full protective equipment throughout this patient encounter including a face mask, gown and gloves. Hand hygiene was performed before donning protective equipment and after removal when leaving the room.    Kaitlin Cain, APRN  4/12/2024  00:25 EDT      Much of this encounter note is an electronic transcription/translation of spoken language to printed text using Dragon Software.

## 2024-04-12 NOTE — CONSULTS
Patient Name: Paz Browne  :1955  68 y.o.    Date of Admission: 2024  Date of Consultation:  24  Encounter Provider: BA Pimentel  Place of Service: [facility] CARDIOLOGY  Referring Provider: Gavino Esteban MD  Patient Care Team:  Javan Martinez MD as PCP - General (Internal Medicine)  Ag Melo Jr., MD as Consulting Physician (Pulmonary Disease)  Cleveland Devine MD as Consulting Physician (Gastroenterology)  Earnest Gan MD as Consulting Physician (Cardiology)  Daniela Shin MD PhD as Consulting Physician (Hematology and Oncology)      Chief complaint: SOA, fever    History of Present Illness:    Ms. Browne is a 68 year old woman followed by Dr. Null for paroxysmal atrial fibrillation, valvular heart disease with previous mitral and tricuspid valve repairs  (mitral valve replacement #31 porcine [employer] Leopold[employer] epic prosthesis after unsuccessful repair. She also had tricuspid valve repair and a right and left CryoMaze procedure with closure of the atrial appendage in ). She had post op AF and was started on warfarin that was later stopped due to GI bleeding.     She had strep bacteremia and endocarditis of the mitral valve in . She was treated with antibiotics for 3 months. She was admitted in 2024 with pneumonia and had AF with RVR. She was started on apixaban. She was admitted 3/31- with fever and chills. Blood cultures positive for enterococcus faecalis. CHIO Showed remnants of prior mitral valve endocarditis with persistent calcified vegetation on the mitral valve. She was treated again with 6 weeks of antibiotics. Hgb at discharge was 7.4 . Apixaban was continued as there were no s/s of active bleeding.     She has COPD and chronic hypoxic respiratory failure.     She came to the emergency room yesterday with shortness of breath. Single view CXR showed small pleural effusions and possible pulmonary edema. She also  "reported fevers and has been started on antibiotics. She is also in rapid atrial fibrillation.     She got IV metoprolol x 2 and a dose of IV lasix. Hr remains rapid. No UOP recorded. No weight recorded.     She was discharged on 4/5 and never really felt any better. She has had constant shortness of breath ,worse with exertion, talking and orthopnea. She has had \"low grade fevers\". Highest 100.2 at home. She has noticed some peripheral edema in hands and feet. Stool is dark.     Echo 4/3/2024    Left ventricular systolic function is normal. Calculated left ventricular EF = 64%    Left ventricular diastolic function is consistent with (grade II w/high LAP) pseudonormalization.    There is a bioprosthetic mitral valve present. The prosthetic mitral valve peak and mean gradients are elevated. Moderate transvalvular regurgitation is present in the prosthetic mitral valve. There is a small, echodense, mobile mass on the posterior leaflet of the prosthetic mitral valve.  No significant changes of mass compared to prior echo.  There is stenosis or obstruction of the prosthetic mitral valve.The mitral valve mean gradient is 15 mmHg.    There is evidence of previous repair of the tricuspid valve with a ring.  Mild to moderate tricuspid valve regurgitation is present. Calculated right ventricular systolic pressure from tricuspid regurgitation is 65 mmHg. Severe pulmonary hypertension is present. Moderate tricuspid valve stenosis is present with transvalvular mean gradient of about 15 mmHg.    Echo 4/4/2024      Left ventricular systolic function is normal. Left ventricular ejection fraction appears to be 61 - 65%.    Left ventricular diastolic function was indeterminate.    The right ventricular cavity is mildly dilated. Normal right ventricular systolic function noted.    The left atrial cavity is mildly dilated.    The left atrial appendage has been previously ligated    There is a bioprosthetic mitral valve replacement " which appears to be well-seated. The bioprosthetic mitral valve leaflets are thickened.    There is a calcified nonmobile echodensity attached to the posterior leaflet of the bioprosthetic mitral valve replacement. This appears to be a calcified remnant of a prior vegetation. There are no obvious acute or mobile vegetations.    Gradients across the bioprosthetic mitral valve replacement are elevated with a peak gradient of 30 mmHg and a mean gradient of 11 mmHg (although the leaflets appear to open well). There is mild mitral regurgitation    There is an annuloplasty ring in the tricuspid position. There is moderate tricuspid regurgitation through the annuloplasty ring.    Calculated right ventricular systolic pressure from tricuspid regurgitation is 76 mmHg    There are moderate plaques in the descending thoracic aorta. There are mild plaques in the aortic arch    There is no evidence of pericardial effusion.       Past Medical History:   Diagnosis Date    VAN (acute kidney injury)     Anemia     Asthma     Atrial flutter     cardioversion    Cataract     Celiac artery stenosis     Chronic respiratory failure with hypoxia     Colon polyp     COPD (chronic obstructive pulmonary disease)     GI bleed     Hiatal hernia     History of CHF (congestive heart failure)     due to MR    History of home oxygen therapy     3 lpm NC    History of mitral valve replacement with tissue graft     Hyperlipidemia     Hypertension     Infectious viral hepatitis     B    Intertrigo     Long term (current) use of anticoagulants     Mitral regurgitation     s/p tissue MVR    PAF (paroxysmal atrial fibrillation)     s/p MAZE    Pneumonia     Pulmonary hypertension     S/P TVR (tricuspid valve repair) 7/7/2016       Past Surgical History:   Procedure Laterality Date    CARDIAC CATHETERIZATION  09/01/2014    Right dominant systemt, normal coronary arteries.     CARDIAC CATHETERIZATION Left 6/10/2016    Procedure: Cardiac catheterization;   Surgeon: Sergei Hall MD;  [facility];  Service:     CARDIAC CATHETERIZATION N/A 6/10/2016    Procedure: Right Heart Cath;  Surgeon: Sergei Hall MD;  [facility];  Service:     CATARACT EXTRACTION      COLONOSCOPY      COLONOSCOPY N/A 8/4/2017    Procedure: COLONOSCOPY TO CECUM/TI WITH POLYPECTOMY ( COLD BX);  Surgeon: Cleveland Devine MD;  [facility] ENDOS[facility];  Service:     COLONOSCOPY N/A 8/10/2017    Procedure: COLONOSCOPY to cecum and TI with 2 clips placed at transverse;  Surgeon: Earnest PALOMO MD;  Location: [facility] ENDOSCOPY;  Service:     COLONOSCOPY N/A 12/22/2017    Procedure: COLONOSCOPY INTO CECUM WITH COLD POLYPECTOMIES;  Surgeon: Cleveland Devine MD;  [facility];  Service:     COLONOSCOPY N/A 5/17/2021    Procedure: COLONOSCOPY to cecum;  Surgeon: Clevelnad Devine MD;  Location: [facility] ENDOSCOPY;  Service: Gastroenterology;  Laterality: N/A;  Pre: Fe deficency anemia, h/x of polyps  Post: fair prep, normal    ENDOSCOPY N/A 8/17/2017    Procedure: ESOPHAGOGASTRODUODENOSCOPY;  Surgeon: Porsha Ruby MD;  Location: [facility] ENDOSCOPY;  Service:     ENDOSCOPY N/A 12/22/2017    Procedure: ESOPHAGOGASTRODUODENOSCOPY WITH BIOPSIES;  Surgeon: Cleveland Devine MD;  Location: [facility] ENDOSCOPY;  Service:     ENDOSCOPY N/A 5/17/2021    Procedure: ESOPHAGOGASTRODUODENOSCOPY  with biopsies;  Surgeon: Cleveland Devine MD;  Location: [facility] ENDOSCOPY;  Service: Gastroenterology;  Laterality: N/A;  Pre: Fe deficency anemia, nausea, heme positive stool   Post: gastritis, sloughing of distal esophagus mucosa    GALLBLADDER SURGERY      HEMORRHOIDECTOMY      HYSTERECTOMY      KIDNEY SURGERY  04/22/2013    Stent placement    MAZE PROCEDURE      MITRAL VALVE REPLACEMENT      TONSILLECTOMY      TRICUSPID VALVE REPLACEMENT           Prior to Admission medications    Medication Sig Start Date End Date Taking? Authorizing Provider   albuterol sulfate  (05  Base) MCG/ACT inhaler Inhale 2 puffs Every 4 (Four) Hours As Needed for Wheezing. 12/1/20  Yes Javan Martinez MD   amLODIPine (NORVASC) 10 MG tablet TAKE 1 TABLET BY MOUTH EVERY DAY 12/9/22  Yes Javan Martinez MD   ampicillin 2 g in sodium chloride 0.9 % 100 mL IVPB Infuse 2 g into a venous catheter Every 4 (Four) Hours for 225 doses. Indications: Bacteria in the Blood, Endocarditis 4/6/24 5/14/24 Yes Gil Nicole MD   apixaban (ELIQUIS) 5 MG tablet tablet Take 1 tablet by mouth Every 12 (Twelve) Hours. Indications: Atrial Fibrillation 3/20/24  Yes Porsha Piper APRN   atorvastatin (LIPITOR) 40 MG tablet TAKE 1 TABLET BY MOUTH EVERY DAY 11/21/22  Yes Javan Martinez MD   budesonide (PULMICORT) 0.5 MG/2ML nebulizer solution Take 2 mL by nebulization 2 (Two) Times a Day for 30 days. Indications: Chronic Obstructive Lung Disease, Chronic hypoxic respiratory failure 4/19/23 4/12/24 Yes Javan Martinez MD   carvedilol (COREG) 6.25 MG tablet TAKE 1 TABLET BY MOUTH TWICE A DAY WITH MEALS 12/13/23  Yes Melanie Sykes APRN   cefTRIAXone 2,000 mg in sodium chloride 0.9 % 100 mL IVPB Infuse 2,000 mg into a venous catheter Every 12 (Twelve) Hours for 76 doses. Indications: Bacteria in the Blood, Endocarditis 4/6/24 5/14/24 Yes Gil Nicole MD   cyclobenzaprine (FLEXERIL) 10 MG tablet TAKE 1 TABLET BY MOUTH EVERYDAY AT BEDTIME 2/20/23  Yes Javan Martienz MD   docosanol (ABREVA) 10 % cream cream Apply 1 Application topically to the appropriate area as directed 5 (Five) Times a Day. 3/6/24  Yes Ronald Marie MD   furosemide (LASIX) 40 MG tablet TAKE 1 TABLET BY MOUTH TWICE A DAY 11/14/22  Yes Javan Martinez MD   HYDROcodone-acetaminophen (NORCO) 7.5-325 MG per tablet Take 1 tablet by mouth Every 6 (Six) Hours As Needed for Moderate Pain (Chronic pain medicine for low back pain). 4/19/23  Yes Javan Martinez MD   ipratropium-albuterol (DUO-NEB) 0.5-2.5 mg/3 ml nebulizer Take 3 mL by  nebulization Every 4 (Four) Hours As Needed for Wheezing. 3/24/23  Yes Javan Martinez MD   lisinopril (PRINIVIL,ZESTRIL) 40 MG tablet Take 1 tablet by mouth Daily. 6/5/21  Yes Paresh Olivera MD   Multiple Vitamins-Minerals (MULTIVITAL) tablet Take 1 tablet by mouth Daily.   Yes Paresh Olivera MD   Nebulizer device 1 Units 4 (Four) Times a Day As Needed (Wheezing). J44.1 j20.8 9/23/20  Yes Javan Martinez MD   O2 (OXYGEN) Inhale 2 L/min Continuous.   Yes Paresh Olivera MD   omeprazole (priLOSEC) 40 MG capsule TAKE 1 CAPSULE BY MOUTH EVERY DAY 12/9/22  Yes Javan Martinez MD   potassium chloride (K-DUR,KLOR-CON) 20 MEQ CR tablet 1 po tid 3/24/23  Yes Javan Martinez MD   roflumilast (DALIRESP) 500 MCG tablet tablet Take 1 tablet by mouth Daily. 3/24/23  Yes Javan Martinez MD   rOPINIRole (REQUIP) 2 MG tablet TAKE 1 TABLET BY MOUTH EVERY DAY AT NIGHT 2/20/23  Yes Javan Martinez MD   sertraline (ZOLOFT) 100 MG tablet TAKE 1 TABLET BY MOUTH EVERY DAY 12/9/22  Yes Black Campbell MD   zolpidem (AMBIEN) 10 MG tablet Take 1 tablet by mouth At Night As Needed for Sleep. 4/19/23  Yes Javan Martinez MD   loperamide (IMODIUM) 2 MG capsule Take 1 capsule by mouth 4 (Four) Times a Day As Needed for Diarrhea. 4/6/24   Gil Nicole MD   loratadine (CLARITIN) 10 MG tablet Take 1 tablet by mouth 2 (two) times a day.    ProviderParesh MD   ondansetron ODT (ZOFRAN-ODT) 4 MG disintegrating tablet Place 1 tablet on the tongue Every 8 (Eight) Hours As Needed for Nausea or Vomiting. 6/9/23   Fam Sosa MD       Allergies   Allergen Reactions    Bupropion Itching    Cephalexin Itching     Tolerated piperacillin/tazobactam, ampicillin, cefdinir, and ceftriaxone    Metoprolol Itching       Social History     Socioeconomic History    Marital status:     Number of children: 2   Tobacco Use    Smoking status: Former     Current packs/day: 0.00     Average packs/day: 3.0 packs/day  for 54.0 years (162.0 ttl pk-yrs)     Types: Cigarettes     Start date:      Quit date:      Years since quittin.2     Passive exposure: Past    Smokeless tobacco: Never    Tobacco comments:     caffeine - tea or mt dew    Vaping Use    Vaping status: Never Used   Substance and Sexual Activity    Alcohol use: No    Drug use: No    Sexual activity: Defer       Family History   Adopted: Yes   Problem Relation Age of Onset    No Known Problems Mother     No Known Problems Father        REVIEW OF SYSTEMS:   All systems reviewed.  Pertinent positives identified in HPI.  All other systems are negative.      Objective:     Vitals:    24 0402 24 0700 24 0737 24 1221   BP: 126/85   108/72   BP Location: Right arm   Right arm   Patient Position: Lying   Lying   Pulse: (!) 150   107   Resp: 20 20 20 20   Temp: 98.4 °F (36.9 °C) 98.1 °F (36.7 °C)  98.5 °F (36.9 °C)   TempSrc: Oral Oral  Oral   SpO2: 94%   98%   Weight:       Height:         Body mass index is 21.55 kg/m².    Physical Exam:  Constitutional: He is oriented to person, place, and time. He appears well-developed. He does not appear ill.   HENT:   Head: Normocephalic and atraumatic. Head is without contusion.   Right Ear: Hearing normal. No drainage.   Left Ear: Hearing normal. No drainage.   Nose: No nasal deformity. No epistaxis.   Eyes: Lids are normal. Right eye exhibits no exudate. Left eye exhibits no exudate.  Neck: No JVD present.   Cardiovascular: Normal rate, irregular rhythm and normal heart sounds.    Pulses:       Posterior tibial pulses are 2+ on the right side, and 2+ on the left side.   Pulmonary/Chest: Effort normal and scattered wheezes.   Abdominal: Soft. Normal appearance and bowel sounds are normal. There is no tenderness.   Musculoskeletal: Normal range of motion.        Right shoulder: He exhibits no deformity.        Left shoulder: He exhibits no deformity.   Neurological: He is alert and oriented to  person, place, and time. He has normal strength.   Skin: Skin is warm, dry and intact. No rash noted.   Psychiatric: He has a normal mood and affect. His behavior is normal. Thought content normal.   Vitals reviewed      Lab Review:     Results from last 7 days   Lab Units 04/12/24  1049 04/12/24  0533   SODIUM mmol/L  --  144   POTASSIUM mmol/L 3.9 3.6   CHLORIDE mmol/L  --  102   CO2 mmol/L  --  33.4*   BUN mg/dL  --  8   CREATININE mg/dL  --  0.82   CALCIUM mg/dL  --  7.9*   BILIRUBIN mg/dL  --  0.5   ALK PHOS U/L  --  69   ALT (SGPT) U/L  --  17   AST (SGOT) U/L  --  24   GLUCOSE mg/dL  --  126*         Results from last 7 days   Lab Units 04/12/24  0534   WBC 10*3/mm3 14.36*   HEMOGLOBIN g/dL 6.9*   HEMATOCRIT % 22.4*   PLATELETS 10*3/mm3 384         Results from last 7 days   Lab Units 04/06/24  0427   MAGNESIUM mg/dL 2.0           Current Facility-Administered Medications:     acetaminophen (TYLENOL) tablet 650 mg, 650 mg, Oral, Q4H PRN, Susanne Bello MD    albuterol (PROVENTIL) nebulizer solution 0.083% 2.5 mg/3mL, 2.5 mg, Nebulization, Q6H PRN, Susanne Bello MD    amLODIPine (NORVASC) tablet 10 mg, 10 mg, Oral, Daily, Susanne Bello MD, 10 mg at 04/12/24 0842    ampicillin 2000 mg IVPB in 100 mL NS (MBP), 2 g, Intravenous, Q4H, Hanna Narvaez APRN    [Held by provider] apixaban (ELIQUIS) tablet 5 mg, 5 mg, Oral, Q12H, Susanne Bello MD, 5 mg at 04/11/24 2351    atorvastatin (LIPITOR) tablet 40 mg, 40 mg, Oral, Daily, Susanne Bello MD, 40 mg at 04/12/24 0842    sennosides-docusate (PERICOLACE) 8.6-50 MG per tablet 2 tablet, 2 tablet, Oral, BID PRN **AND** polyethylene glycol (MIRALAX) packet 17 g, 17 g, Oral, Daily PRN **AND** bisacodyl (DULCOLAX) EC tablet 5 mg, 5 mg, Oral, Daily PRN **AND** bisacodyl (DULCOLAX) suppository 10 mg, 10 mg, Rectal, Daily PRN, Stingl, Susanne Estrella MD    budesonide (PULMICORT) nebulizer solution 0.5 mg, 0.5 mg, Nebulization,  BID - RT, Susanne Bello MD, 0.5 mg at 04/12/24 0737    Calcium Replacement - Follow Nurse / BPA Driven Protocol, , Does not apply, PRN, Susanne Bello MD    carvedilol (COREG) tablet 6.25 mg, 6.25 mg, Oral, BID With Meals, Susanne Bello MD, 6.25 mg at 04/12/24 0841    cefTRIAXone (ROCEPHIN) 2,000 mg in sodium chloride 0.9 % 100 mL MBP, 2,000 mg, Intravenous, Q12H, Susanne Bello MD, Last Rate: 200 mL/hr at 04/12/24 0842, 2,000 mg at 04/12/24 0842    cetirizine (zyrTEC) tablet 10 mg, 10 mg, Oral, Daily, Susanne Bello MD, 10 mg at 04/12/24 0842    cyclobenzaprine (FLEXERIL) tablet 10 mg, 10 mg, Oral, Nightly, Susanne Bello MD, 10 mg at 04/11/24 2351    diphenhydrAMINE (BENADRYL) capsule 25 mg, 25 mg, Oral, Once PRN, Ronald Marie MD    furosemide (LASIX) injection 40 mg, 40 mg, Intravenous, Q12H, Ildefonso Harris MD, 40 mg at 04/12/24 0700    HYDROcodone-acetaminophen (NORCO) 7.5-325 MG per tablet 1 tablet, 1 tablet, Oral, Q6H PRN, Susanne Bello MD, 1 tablet at 04/12/24 0452    ipratropium-albuterol (DUO-NEB) nebulizer solution 3 mL, 3 mL, Nebulization, Q4H PRN, Susanne Bello MD    lisinopril (PRINIVIL,ZESTRIL) tablet 40 mg, 40 mg, Oral, Daily, Susanne Bello MD, 40 mg at 04/12/24 0842    Magnesium Standard Dose Replacement - Follow Nurse / BPA Driven Protocol, , Does not apply, PRN, Susanne Bello MD    melatonin tablet 3 mg, 3 mg, Oral, Nightly PRN, Susanne Bello MD    multivitamin with minerals 1 tablet, 1 tablet, Oral, Daily, Susanne Bello MD, 1 tablet at 04/12/24 0842    ondansetron ODT (ZOFRAN-ODT) disintegrating tablet 4 mg, 4 mg, Oral, Q6H PRN **OR** ondansetron (ZOFRAN) injection 4 mg, 4 mg, Intravenous, Q6H PRN, Susanne Bello MD, 4 mg at 04/12/24 0858    pantoprazole (PROTONIX) EC tablet 40 mg, 40 mg, Oral, Q AM, Susanne Bello MD, 40 mg at 04/12/24 0620    Phosphorus Replacement -  Follow Nurse / BPA Driven Protocol, , Does not apply, PRN, Susanne Bello MD    potassium chloride (K-DUR,KLOR-CON) ER tablet 20 mEq, 20 mEq, Oral, BID With Meals, Susanne Bello MD, 20 mEq at 04/11/24 2351    potassium chloride (K-DUR,KLOR-CON) ER tablet 40 mEq, 40 mEq, Oral, Q4H, Jose Luis Conteh MD, 40 mEq at 04/12/24 0842    Potassium Replacement - Follow Nurse / BPA Driven Protocol, , Does not apply, PRN, Susanne Bello MD    roflumilast (DALIRESP) tablet 500 mcg, 500 mcg, Oral, Daily, Susanne Bello MD, 500 mcg at 04/12/24 0841    rOPINIRole (REQUIP) tablet 2 mg, 2 mg, Oral, Nightly, Susanne Bello MD, 2 mg at 04/12/24 0034    sertraline (ZOLOFT) tablet 100 mg, 100 mg, Oral, Daily, Susanne Bello MD, 100 mg at 04/12/24 0842    sodium chloride 0.9 % flush 10 mL, 10 mL, Intravenous, Q12H, Jose Luis Conteh MD    sodium chloride 0.9 % flush 10 mL, 10 mL, Intravenous, Q12H, Jose Luis Conteh MD    sodium chloride 0.9 % flush 10 mL, 10 mL, Intravenous, PRN, Jose Luis Conteh MD    sodium chloride 0.9 % flush 20 mL, 20 mL, Intravenous, PRN, Jose Luis Conteh MD    sodium chloride 0.9 % infusion 40 mL, 40 mL, Intravenous, PRN, Jose Luis Conteh MD    zolpidem (AMBIEN) tablet 10 mg, 10 mg, Oral, Nightly PRN, Susanne Bello MD, 10 mg at 04/12/24 0107    Assessment and Plan:       Acute on chronic hypoxic respiratory failure - +fever . Agree with CT chest to further delineate etiology.  Likely multifactorial.   Acute on chronic diastolic heart failure due to atrial fibrillation  Paroxysmal atrial fibrillation, HR rapid and may be contributing to #2. Stop amlodipine. Titrate beta blocker as tolerated. Will also give a dose of IV digoxin.               --- she has a remote history of AF following mitral valve replacement. She was anticoagulated with warfarin which was later stopped due to GI bleed. She then had  recurrent atrial fibrillation in March of this year and was started on apixaban.  She is significantly anemic. Agree with holding apixaban.   4.    Recent enterococcus bacteremia , treating with 6 weeks of IV antibiotics given prosthetic mitral valve and remnant vegetations with known history of endocarditis.   5.   Mild aortic valve stenosis  6.   Hypertension - back off on antiHTNs to allow rate controlling agents to be maximized.   7.   Anemia, likely acute on chronic with concern for blood loss with declining h/h and anticoagulant use as above.   8.  Valvular heart disease status post mitral valve replacement #31 porcine [employer] Leopoldo epic prosthesis after unsuccessful repair. She also had tricuspid valve repair and a right and left CryoMaze procedure with closure of the atrial appendage in 2014).    She has a lot going on. I think her symptoms and SOA are multifactorial. She is pretty wheezy. Unclear infectious picture. White count up a little but procal and lactate negative.     Continue IV diuretics. Slow her HR down. She is getting transfused 1 unit PRBCs and likely needs GI eval for blood loss. HR is too fast. Will give a dose of IV digoxin. Stop amlodipine to allow titration of AV mateus blockers.     She has preserved LVEF with grade II diastolic dysfunction on recent echocardiogram 4/3/2024.     Virginia Conroy, APRN  04/12/24  12:41 EDT

## 2024-04-12 NOTE — PLAN OF CARE
Goal Outcome Evaluation:         Pt. Admitted to this unit at 2230 with diagnosis of sepsis, vancomycin IV completed at this unit, with PICC to left upper arm, ATB IV continuously infusion per PICC, v/s stable with Hreat rated high, checked over 150, Pt. Wanted to have pain medicine, pain medicine given at this early morning time, o2 sats kept 95% with 02 inhal. @4l/m per n/c, no s/s acute distress noted at this time

## 2024-04-12 NOTE — CONSULTS
"Referring Provider: Dr. Bello    Reason for Consultation: Sepsis    History of present illness:  Paz Browne is a 68 y.o. who I am asked to evaluate and give opinion for \"sepsis\". History is obtained from the patient, her , and review of the old medical records which I summarize/synthesize as follows: I saw her during her recent hospital stay from 4/2/2024 - 4/5/2024.  During that admission she was diagnosed with Enterococcus septicemia.  She had a TTE and CHIO that showed a stable, known, small, echodense, mobile mass on the posterior leaflet of the prosthetic mitral valve.  After considering the clinical scenario and discussing with cardiology, I felt that the benefits outweighed the risk in terms of treating her with a 6-week course of antibiotics.  I discharged her on ampicillin and ceftriaxone twice daily with a planned stop date of 5/13/2024.    She presented to the emergency room yesterday afternoon with shortness of breath.  She had associated cough and edema.  In the emergency room she was afebrile but tachycardic and found to have A-fib with RVR.  Exam was notable for 1+ pitting edema in the lower extremities.  Labs showed a WBC 15 and a Pro-Williams 0.06.  Her BNP was elevated to 4313.  Her RPP was negative.  Chest x-ray showed findings consistent with pulmonary edema versus multifocal pneumonia.    She was seen by pulmonary medicine and started on diuretics.  Cardiology evaluation is pending.      Past Medical History:   Diagnosis Date    VAN (acute kidney injury)     Anemia     Asthma     Atrial flutter     cardioversion    Cataract     Celiac artery stenosis     Chronic respiratory failure with hypoxia     Colon polyp     COPD (chronic obstructive pulmonary disease)     GI bleed     Hiatal hernia     History of CHF (congestive heart failure)     due to MR    History of home oxygen therapy     3 lpm NC    History of mitral valve replacement with tissue graft     Hyperlipidemia     Hypertension  "    Infectious viral hepatitis     B    Intertrigo     Long term (current) use of anticoagulants     Mitral regurgitation     s/p tissue MVR    PAF (paroxysmal atrial fibrillation)     s/p MAZE    Pneumonia     Pulmonary hypertension     S/P TVR (tricuspid valve repair) 7/7/2016       Past Surgical History:   Procedure Laterality Date    CARDIAC CATHETERIZATION  09/01/2014    Right dominant systemt, normal coronary arteries.     CARDIAC CATHETERIZATION Left 6/10/2016    Procedure: Cardiac catheterization;  Surgeon: Sergei Hall MD;  Location: [facility] CATH INVASIVE LOCATION;  Service:     CARDIAC CATHETERIZATION N/A 6/10/2016    Procedure: Right Heart Cath;  Surgeon: Sergei Hall MD;  Location: [facility] CATH INVASIVE LOCATION;  Service:     CATARACT EXTRACTION      COLONOSCOPY      COLONOSCOPY N/A 8/4/2017    Procedure: COLONOSCOPY TO CECUM/TI WITH POLYPECTOMY ( COLD BX);  Surgeon: Cleveland Devine MD;  Location: [facility] ENDOSCOPY;  Service:     COLONOSCOPY N/A 8/10/2017    Procedure: COLONOSCOPY to cecum and TI with 2 clips placed at transverse;  Surgeon: Earnest PALOMO MD;  Location: [facility] ENDOSCOPY;  Service:     COLONOSCOPY N/A 12/22/2017    Procedure: COLONOSCOPY INTO CECUM WITH COLD POLYPECTOMIES;  Surgeon: Cleveland Devine MD;  Location: [facility] ENDOSCOPY;  Service:     COLONOSCOPY N/A 5/17/2021    Procedure: COLONOSCOPY to cecum;  Surgeon: Cleveland Devine MD;  Location: [facility] ENDOSCOPY;  Service: Gastroenterology;  Laterality: N/A;  Pre: Fe deficency anemia, h/x of polyps  Post: fair prep, normal    ENDOSCOPY N/A 8/17/2017    Procedure: ESOPHAGOGASTRODUODENOSCOPY;  Surgeon: Porsha Ruby MD;  Location: [facility] ENDOSCOPY;  Service:     ENDOSCOPY N/A 12/22/2017    Procedure: ESOPHAGOGASTRODUODENOSCOPY WITH BIOPSIES;  Surgeon: Cleveland Devine MD;  Location: [facility] ENDOSCOPY;  Service:     ENDOSCOPY N/A 5/17/2021    Procedure: ESOPHAGOGASTRODUODENOSCOPY  with biopsies;  Surgeon: Cleveland Devine MD;  Location: [facility] ENDOSCOPY;   Service: Gastroenterology;  Laterality: N/A;  Pre: Fe deficency anemia, nausea, heme positive stool   Post: gastritis, sloughing of distal esophagus mucosa    GALLBLADDER SURGERY      HEMORRHOIDECTOMY      HYSTERECTOMY      KIDNEY SURGERY  04/22/2013    Stent placement    MAZE PROCEDURE      MITRAL VALVE REPLACEMENT      TONSILLECTOMY      TRICUSPID VALVE REPLACEMENT       Social History:  Quit smoking 2007  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 Cephalexin - itching many years ago; tolerates ceftriaxone and cefadroxil  2. Penicillin - rash, itching; currently tolerating ampicillin    Medications:    Current Facility-Administered Medications:     acetaminophen (TYLENOL) tablet 650 mg, 650 mg, Oral, Q4H PRN, Susanne Bello MD    albuterol (PROVENTIL) nebulizer solution 0.083% 2.5 mg/3mL, 2.5 mg, Nebulization, Q6H PRN, Susanne Bello MD    amLODIPine (NORVASC) tablet 10 mg, 10 mg, Oral, Daily, Susanne Bello MD, 10 mg at 04/12/24 0842    ampicillin 2000 mg IVPB in 100 mL NS (MBP), 2 g, Intravenous, Q4H, Hanna Narvaez, BA    apixaban (ELIQUIS) tablet 5 mg, 5 mg, Oral, Q12H, Susanne Bello MD, 5 mg at 04/11/24 2351    atorvastatin (LIPITOR) tablet 40 mg, 40 mg, Oral, Daily, Susanne Bello MD, 40 mg at 04/12/24 0842    sennosides-docusate (PERICOLACE) 8.6-50 MG per tablet 2 tablet, 2 tablet, Oral, BID PRN **AND** polyethylene glycol (MIRALAX) packet 17 g, 17 g, Oral, Daily PRN **AND** bisacodyl (DULCOLAX) EC tablet 5 mg, 5 mg, Oral, Daily PRN **AND** bisacodyl (DULCOLAX) suppository 10 mg, 10 mg, Rectal, Daily PRN, Susanne Bello MD    budesonide (PULMICORT) nebulizer solution 0.5 mg, 0.5 mg, Nebulization, BID - RT, Susanne Bello MD, 0.5 mg at 04/12/24 0737    Calcium Replacement - Follow Nurse / BPA Driven Protocol, , Does not apply, PRN, Stingl, Susanne Estrella MD    carvedilol (COREG) tablet  6.25 mg, 6.25 mg, Oral, BID With Meals, Susanne Bello MD, 6.25 mg at 04/12/24 0841    cefTRIAXone (ROCEPHIN) 2,000 mg in sodium chloride 0.9 % 100 mL MBP, 2,000 mg, Intravenous, Q12H, Susanne Bello MD, Last Rate: 200 mL/hr at 04/12/24 0842, 2,000 mg at 04/12/24 0842    cetirizine (zyrTEC) tablet 10 mg, 10 mg, Oral, Daily, Susanne Bello MD, 10 mg at 04/12/24 0842    cyclobenzaprine (FLEXERIL) tablet 10 mg, 10 mg, Oral, Nightly, Susanne Bello MD, 10 mg at 04/11/24 2351    diphenhydrAMINE (BENADRYL) capsule 25 mg, 25 mg, Oral, Once PRN, Ronald Marie MD    furosemide (LASIX) injection 40 mg, 40 mg, Intravenous, Q12H, Ildefonso Harris MD, 40 mg at 04/12/24 0700    HYDROcodone-acetaminophen (NORCO) 7.5-325 MG per tablet 1 tablet, 1 tablet, Oral, Q6H PRN, Susanne Bello MD, 1 tablet at 04/12/24 0452    ipratropium-albuterol (DUO-NEB) nebulizer solution 3 mL, 3 mL, Nebulization, Q4H PRN, Susanne Bello MD    lisinopril (PRINIVIL,ZESTRIL) tablet 40 mg, 40 mg, Oral, Daily, Susanne Belol MD, 40 mg at 04/12/24 0842    Magnesium Standard Dose Replacement - Follow Nurse / BPA Driven Protocol, , Does not apply, PRN, Susanne Bello MD    melatonin tablet 3 mg, 3 mg, Oral, Nightly PRN, Susanne Bello MD    multivitamin with minerals 1 tablet, 1 tablet, Oral, Daily, Susanne Bello MD, 1 tablet at 04/12/24 0842    ondansetron ODT (ZOFRAN-ODT) disintegrating tablet 4 mg, 4 mg, Oral, Q6H PRN **OR** ondansetron (ZOFRAN) injection 4 mg, 4 mg, Intravenous, Q6H PRN, Susanne Bello MD, 4 mg at 04/12/24 0858    pantoprazole (PROTONIX) EC tablet 40 mg, 40 mg, Oral, Q AM, Susanne Bello MD, 40 mg at 04/12/24 0620    Pharmacy to dose vancomycin, , Does not apply, Continuous PRN, Susanne Bello MD    Phosphorus Replacement - Follow Nurse / BPA Driven Protocol, , Does not apply, PREugenia DONATO Renate Andrea, MD    potassium chloride  (K-DUR,KLOR-CON) ER tablet 20 mEq, 20 mEq, Oral, BID With Meals, Susanne Bello MD, 20 mEq at 04/11/24 2351    potassium chloride (K-DUR,KLOR-CON) ER tablet 40 mEq, 40 mEq, Oral, Q4H, Jose Luis Conteh MD, 40 mEq at 04/12/24 0842    Potassium Replacement - Follow Nurse / BPA Driven Protocol, , Does not apply, PRN, Susanne Bello MD    roflumilast (DALIRESP) tablet 500 mcg, 500 mcg, Oral, Daily, Susanne Bello MD, 500 mcg at 04/12/24 0841    rOPINIRole (REQUIP) tablet 2 mg, 2 mg, Oral, Nightly, Susanne Bello MD, 2 mg at 04/12/24 0034    sertraline (ZOLOFT) tablet 100 mg, 100 mg, Oral, Daily, Susanne Bello MD, 100 mg at 04/12/24 0842    vancomycin (VANCOCIN) 1,000 mg in sodium chloride 0.9 % 250 mL IVPB-VTB, 1,000 mg, Intravenous, Q24H, Susanne Bello MD    zolpidem (AMBIEN) tablet 10 mg, 10 mg, Oral, Nightly PRN, Susanne Bello MD, 10 mg at 04/12/24 0107      Objective   Vital Signs   Temp:  [98.1 °F (36.7 °C)-99 °F (37.2 °C)] 98.1 °F (36.7 °C)  Heart Rate:  [144-150] 150  Resp:  [20] 20  BP: (124-145)/(70-96) 126/85    Physical Exam:   General: awake, alert, NAD, very nice  Eyes: no scleral icterus  ENT: no thrush  Cardiovascular: Irregularly irregular, tachycardic, 1+ lower extremity edema  Respiratory: Tachypneic, wheezing, on 4 to 5 L oxygen via nasal cannula  GI: Abdomen is soft, not distended  :  no Vargas catheter  Neurological: Alert and oriented x 3  Psychiatric: Normal mood and affect   Vasc: PICC w/o erythema    Labs:     Lab Results   Component Value Date    WBC 14.36 (H) 04/12/2024    HGB 6.9 (C) 04/12/2024    HCT 22.4 (L) 04/12/2024    MCV 83.9 04/12/2024     04/12/2024       Lab Results   Component Value Date    GLUCOSE 126 (H) 04/12/2024    BUN 8 04/12/2024    CREATININE 0.82 04/12/2024    EGFRIFNONA 61 08/16/2021    EGFRIFAFRI 86 07/12/2021    BCR 9.8 04/12/2024    CO2 33.4 (H) 04/12/2024    CALCIUM 7.9 (L) 04/12/2024     "ALBUMIN 3.5 04/12/2024    AST 24 04/12/2024    ALT 17 04/12/2024     Microbiology:  3/31 RPP: Negative  3/31 BCx: Enterococcus faecalis (not VRE) in 2/2 sets (susceptible to ampicillin)  4/1 MRSA nares PCR: Negative  4/2 blood cultures: Negative to date  4/11 RPP: Negative  4/11 BCx: Pending  4/12 respiratory culture: \"Rejected\"    Radiology:  4/11 CXR personally reviewed shows pleural effusions; left PICC line in place    ASSESSMENT/PLAN:  Shortness of breath due to pulmonary edema  Atrial fibrillation with rapid ventricular response  Enterococcus septicemia  Mitral valve replaced  History of tricuspid valve repair  History of penicillin allergy -tolerates ampicillin  COPD due to history of tobacco abuse  Pulmonary hypertension  Hiatal hernia  Anemia    It would be unusual but not impossible to get new infection while on outpatient ampicillin and ceftriaxone.  Given the lack of fever, normal procalcitonin, chest x-ray findings, elevated BNP,  and LE edema, I think volume overload and pulmonary edema are much more likely.  I suspect atrial fibrillation with rapid ventricular response is also contributing.  Pulmonary has evaluated and recommended diuretics, and I agree with that plan.  Cardiology has been consulted to help with heart rate control.    I have stopped the vancomycin.    ID will follow.     "

## 2024-04-12 NOTE — CASE MANAGEMENT/SOCIAL WORK
Discharge Planning Assessment  [facility]     Patient Name: Paz Browne  MRN: [id number]  Today's Date: 4/12/2024    Admit Date: 4/11/2024    Plan: Plan home with family.   CRUZ Ashley RN   Discharge Needs Assessment       Row Name 04/12/24 0935       Living Environment    People in Home spouse;other relative(s)    Name(s) of People in Home Spouse  ( Chapincito Browne ([phone]) and brother in law ( [place])    Current Living Arrangements other (see comments)  Mobile Home    Potentially Unsafe Housing Conditions none    Primary Care Provided by self    Provides Primary Care For no one    Family Caregiver if Needed spouse    Family Caregiver Names Spouse ( Chapincito Browne ([phone])    Quality of Family Relationships helpful;involved;supportive    Able to Return to Prior Arrangements no    Living Arrangement Comments Pt lives with her spouse Chapincito Browne ([phone]) and brother in law ( Eagle) in a mobile home.       Resource/Environmental Concerns    Resource/Environmental Concerns none    Transportation Concerns none       Transition Planning    Patient/Family Anticipates Transition to home with family    Patient/Family Anticipated Services at Transition none    Transportation Anticipated family or friend will provide       Discharge Needs Assessment    Equipment Currently Used at Home nebulizer;oxygen;shower chair;wheelchair;other (see comments)  Electric Scooter    Concerns to be Addressed no discharge needs identified;denies needs/concerns at this time    Anticipated Changes Related to Illness none    Equipment Needed After Discharge nebulizer;oxygen;shower chair;wheelchair, manual;other (see comments)  Electric Scooter                   Discharge Plan       Row Name 04/12/24 0939       Plan    Plan Plan home with family.   CRUZ Ashley RN    Patient/Family in Agreement with Plan yes    Plan Comments FACE SHEET VERIFIED/ IM LETTER SIGNED.  Spoke with pt and pt's spouse ( Chapincito) at bedside..   Pt's PCP is Dr. Javan Martinez. Pt lives with her spouse Chapincito Browne ([phone]) and brother in law ( Eagle) in a mobile home.  Pt is independent with ADLs. Pt has home O2 provided by [employer].  Pt also has a nebulizer, shower chair, rolling walker, wheelchair, and electric scooter for home use if needed. Pt gets her prescriptions at [facility] in [place].  Pt denies any issues affording medications. Pt is not current with HH. Pt has not been in SNF. Pt denies any discharge needs. Pt's spouse will assist pt at home and transport pt home.  Plan home with family.  CRUZ Ashley RN                  Continued Care and Services - Admitted Since 4/11/2024    No active coordination exists for this encounter.       Selected Continued Care - Prior Encounters Includes continued care and service providers with selected services from prior encounters from 1/12/2024 to 4/12/2024      Discharged on 4/6/2024 Admission date: 3/31/2024 - Discharge disposition: Home or Self Care      Dialysis/Infusion       Service Provider Selected Services Address Phone Fax Patient Preferred    [facility] [id number] [address] [phone] --                             Demographic Summary       Row Name 04/12/24 0935       General Information    Admission Type inpatient    Arrived From emergency department    Required Notices Provided Important Message from Medicare    Referral Source admission list    Reason for Consult discharge planning    Preferred Language English                   Functional Status       Row Name 04/12/24 0935       Functional Status    Usual Activity Tolerance moderate    Current Activity Tolerance moderate       Functional Status, IADL    Medications independent    Meal Preparation assistive person    Housekeeping assistive person    Laundry assistive person    Shopping assistive person       Mental Status    General Appearance WDL WDL                    Psychosocial    No documentation.                  Abuse/Neglect    No documentation.                  Legal    No documentation.                  Substance Abuse    No documentation.                  Patient Forms    No documentation.                     Amada Ashley RN

## 2024-04-13 LAB
ALBUMIN SERPL-MCNC: 3.3 G/DL (ref 3.5–5.2)
ALBUMIN/GLOB SERPL: 1.3 G/DL
ALP SERPL-CCNC: 66 U/L (ref 39–117)
ALT SERPL W P-5'-P-CCNC: 15 U/L (ref 1–33)
ANION GAP SERPL CALCULATED.3IONS-SCNC: 10.8 MMOL/L (ref 5–15)
AST SERPL-CCNC: 23 U/L (ref 1–32)
BH BB BLOOD EXPIRATION DATE: NORMAL
BH BB BLOOD TYPE BARCODE: 6200
BH BB DISPENSE STATUS: NORMAL
BH BB PRODUCT CODE: NORMAL
BH BB UNIT NUMBER: NORMAL
BILIRUB SERPL-MCNC: 0.5 MG/DL (ref 0–1.2)
BUN SERPL-MCNC: 9 MG/DL (ref 8–23)
BUN/CREAT SERPL: 11.5 (ref 7–25)
CALCIUM SPEC-SCNC: 8.6 MG/DL (ref 8.6–10.5)
CHLORIDE SERPL-SCNC: 99 MMOL/L (ref 98–107)
CO2 SERPL-SCNC: 36.2 MMOL/L (ref 22–29)
CREAT SERPL-MCNC: 0.78 MG/DL (ref 0.57–1)
CROSSMATCH INTERPRETATION: NORMAL
CRP SERPL-MCNC: 0.38 MG/DL (ref 0–0.5)
DEPRECATED RDW RBC AUTO: 49.8 FL (ref 37–54)
EGFRCR SERPLBLD CKD-EPI 2021: 81.8 ML/MIN/1.73
ERYTHROCYTE [DISTWIDTH] IN BLOOD BY AUTOMATED COUNT: 15.9 % (ref 12.3–15.4)
GLOBULIN UR ELPH-MCNC: 2.6 GM/DL
GLUCOSE SERPL-MCNC: 102 MG/DL (ref 65–99)
HCT VFR BLD AUTO: 28.6 % (ref 34–46.6)
HEMOCCULT STL QL: POSITIVE
HGB BLD-MCNC: 8.7 G/DL (ref 12–15.9)
MAGNESIUM SERPL-MCNC: 2 MG/DL (ref 1.6–2.4)
MCH RBC QN AUTO: 26.4 PG (ref 26.6–33)
MCHC RBC AUTO-ENTMCNC: 30.4 G/DL (ref 31.5–35.7)
MCV RBC AUTO: 86.9 FL (ref 79–97)
PHOSPHATE SERPL-MCNC: 3 MG/DL (ref 2.5–4.5)
PLATELET # BLD AUTO: 248 10*3/MM3 (ref 140–450)
PMV BLD AUTO: 9.3 FL (ref 6–12)
POTASSIUM SERPL-SCNC: 3.6 MMOL/L (ref 3.5–5.2)
POTASSIUM SERPL-SCNC: 4.3 MMOL/L (ref 3.5–5.2)
PROT SERPL-MCNC: 5.9 G/DL (ref 6–8.5)
RBC # BLD AUTO: 3.29 10*6/MM3 (ref 3.77–5.28)
SODIUM SERPL-SCNC: 146 MMOL/L (ref 136–145)
UNIT  ABO: NORMAL
UNIT  RH: NORMAL
WBC NRBC COR # BLD AUTO: 11.01 10*3/MM3 (ref 3.4–10.8)

## 2024-04-13 PROCEDURE — 82272 OCCULT BLD FECES 1-3 TESTS: CPT | Performed by: HOSPITALIST

## 2024-04-13 PROCEDURE — 25010000002 ONDANSETRON PER 1 MG: Performed by: INTERNAL MEDICINE

## 2024-04-13 PROCEDURE — 84100 ASSAY OF PHOSPHORUS: CPT | Performed by: INTERNAL MEDICINE

## 2024-04-13 PROCEDURE — 83735 ASSAY OF MAGNESIUM: CPT | Performed by: INTERNAL MEDICINE

## 2024-04-13 PROCEDURE — 86140 C-REACTIVE PROTEIN: CPT | Performed by: HOSPITALIST

## 2024-04-13 PROCEDURE — 25010000002 FUROSEMIDE PER 20 MG: Performed by: HOSPITALIST

## 2024-04-13 PROCEDURE — 94761 N-INVAS EAR/PLS OXIMETRY MLT: CPT

## 2024-04-13 PROCEDURE — 85027 COMPLETE CBC AUTOMATED: CPT | Performed by: HOSPITALIST

## 2024-04-13 PROCEDURE — 94799 UNLISTED PULMONARY SVC/PX: CPT

## 2024-04-13 PROCEDURE — 84132 ASSAY OF SERUM POTASSIUM: CPT | Performed by: HOSPITALIST

## 2024-04-13 PROCEDURE — 25010000002 AMPICILLIN PER 500 MG: Performed by: NURSE PRACTITIONER

## 2024-04-13 PROCEDURE — 87205 SMEAR GRAM STAIN: CPT | Performed by: INTERNAL MEDICINE

## 2024-04-13 PROCEDURE — 94664 DEMO&/EVAL PT USE INHALER: CPT

## 2024-04-13 PROCEDURE — 80053 COMPREHEN METABOLIC PANEL: CPT | Performed by: HOSPITALIST

## 2024-04-13 PROCEDURE — 25010000002 DIGOXIN PER 500 MCG: Performed by: INTERNAL MEDICINE

## 2024-04-13 PROCEDURE — 25010000002 CEFTRIAXONE PER 250 MG: Performed by: INTERNAL MEDICINE

## 2024-04-13 PROCEDURE — 99232 SBSQ HOSP IP/OBS MODERATE 35: CPT | Performed by: INTERNAL MEDICINE

## 2024-04-13 RX ORDER — ECHINACEA PURPUREA EXTRACT 125 MG
2 TABLET ORAL AS NEEDED
Status: DISCONTINUED | OUTPATIENT
Start: 2024-04-13 | End: 2024-04-21 | Stop reason: HOSPADM

## 2024-04-13 RX ORDER — POTASSIUM CHLORIDE 750 MG/1
40 TABLET, FILM COATED, EXTENDED RELEASE ORAL EVERY 4 HOURS
Status: DISCONTINUED | OUTPATIENT
Start: 2024-04-13 | End: 2024-04-13

## 2024-04-13 RX ORDER — SPIRONOLACTONE 25 MG/1
25 TABLET ORAL DAILY
Status: DISCONTINUED | OUTPATIENT
Start: 2024-04-13 | End: 2024-04-21 | Stop reason: HOSPADM

## 2024-04-13 RX ORDER — DILTIAZEM HYDROCHLORIDE 60 MG/1
60 TABLET, FILM COATED ORAL EVERY 6 HOURS SCHEDULED
Status: DISCONTINUED | OUTPATIENT
Start: 2024-04-13 | End: 2024-04-21 | Stop reason: HOSPADM

## 2024-04-13 RX ORDER — DIGOXIN 0.25 MG/ML
500 INJECTION INTRAMUSCULAR; INTRAVENOUS ONCE
Status: COMPLETED | OUTPATIENT
Start: 2024-04-13 | End: 2024-04-13

## 2024-04-13 RX ADMIN — LISINOPRIL 40 MG: 40 TABLET ORAL at 10:04

## 2024-04-13 RX ADMIN — CARVEDILOL 6.25 MG: 6.25 TABLET, FILM COATED ORAL at 10:04

## 2024-04-13 RX ADMIN — AMPICILLIN SODIUM 2 G: 2 INJECTION, POWDER, FOR SOLUTION INTRAVENOUS at 04:42

## 2024-04-13 RX ADMIN — BUDESONIDE 0.5 MG: 0.5 INHALANT RESPIRATORY (INHALATION) at 19:46

## 2024-04-13 RX ADMIN — CYCLOBENZAPRINE 10 MG: 10 TABLET, FILM COATED ORAL at 20:43

## 2024-04-13 RX ADMIN — HYDROCODONE BITARTRATE AND ACETAMINOPHEN 1 TABLET: 7.5; 325 TABLET ORAL at 21:41

## 2024-04-13 RX ADMIN — Medication 10 ML: at 20:48

## 2024-04-13 RX ADMIN — POTASSIUM CHLORIDE 20 MEQ: 750 TABLET, EXTENDED RELEASE ORAL at 10:04

## 2024-04-13 RX ADMIN — ATORVASTATIN CALCIUM 40 MG: 20 TABLET, FILM COATED ORAL at 10:04

## 2024-04-13 RX ADMIN — DILTIAZEM HYDROCHLORIDE 60 MG: 60 TABLET, FILM COATED ORAL at 17:33

## 2024-04-13 RX ADMIN — ONDANSETRON 4 MG: 2 INJECTION INTRAMUSCULAR; INTRAVENOUS at 09:55

## 2024-04-13 RX ADMIN — Medication 10 ML: at 10:07

## 2024-04-13 RX ADMIN — HYDROCODONE BITARTRATE AND ACETAMINOPHEN 1 TABLET: 7.5; 325 TABLET ORAL at 13:47

## 2024-04-13 RX ADMIN — PANTOPRAZOLE SODIUM 40 MG: 40 INJECTION, POWDER, FOR SOLUTION INTRAVENOUS at 09:55

## 2024-04-13 RX ADMIN — HYDROCODONE BITARTRATE AND ACETAMINOPHEN 1 TABLET: 7.5; 325 TABLET ORAL at 04:50

## 2024-04-13 RX ADMIN — ROFLUMILAST 500 MCG: 500 TABLET ORAL at 10:03

## 2024-04-13 RX ADMIN — BUDESONIDE 0.5 MG: 0.5 INHALANT RESPIRATORY (INHALATION) at 07:01

## 2024-04-13 RX ADMIN — CEFTRIAXONE 2000 MG: 2 INJECTION, POWDER, FOR SOLUTION INTRAMUSCULAR; INTRAVENOUS at 20:44

## 2024-04-13 RX ADMIN — CETIRIZINE HYDROCHLORIDE 10 MG: 10 TABLET ORAL at 10:04

## 2024-04-13 RX ADMIN — Medication 3 MG: at 20:43

## 2024-04-13 RX ADMIN — PANTOPRAZOLE SODIUM 40 MG: 40 INJECTION, POWDER, FOR SOLUTION INTRAVENOUS at 04:55

## 2024-04-13 RX ADMIN — IPRATROPIUM BROMIDE AND ALBUTEROL SULFATE 3 ML: .5; 3 SOLUTION RESPIRATORY (INHALATION) at 07:00

## 2024-04-13 RX ADMIN — FUROSEMIDE 40 MG: 10 INJECTION, SOLUTION INTRAMUSCULAR; INTRAVENOUS at 20:43

## 2024-04-13 RX ADMIN — SPIRONOLACTONE 25 MG: 25 TABLET, FILM COATED ORAL at 15:16

## 2024-04-13 RX ADMIN — AMPICILLIN SODIUM 2 G: 2 INJECTION, POWDER, FOR SOLUTION INTRAVENOUS at 10:06

## 2024-04-13 RX ADMIN — AMPICILLIN SODIUM 2 G: 2 INJECTION, POWDER, FOR SOLUTION INTRAVENOUS at 20:43

## 2024-04-13 RX ADMIN — DIGOXIN 500 MCG: 0.25 INJECTION INTRAMUSCULAR; INTRAVENOUS at 13:10

## 2024-04-13 RX ADMIN — Medication 1 TABLET: at 10:04

## 2024-04-13 RX ADMIN — PANTOPRAZOLE SODIUM 40 MG: 40 INJECTION, POWDER, FOR SOLUTION INTRAVENOUS at 20:44

## 2024-04-13 RX ADMIN — DILTIAZEM HYDROCHLORIDE 60 MG: 60 TABLET, FILM COATED ORAL at 15:16

## 2024-04-13 RX ADMIN — SERTRALINE 100 MG: 100 TABLET, FILM COATED ORAL at 10:04

## 2024-04-13 RX ADMIN — FUROSEMIDE 40 MG: 10 INJECTION, SOLUTION INTRAMUSCULAR; INTRAVENOUS at 07:45

## 2024-04-13 RX ADMIN — ROPINIROLE 2 MG: 2 TABLET, FILM COATED ORAL at 20:43

## 2024-04-13 RX ADMIN — Medication 10 ML: at 20:45

## 2024-04-13 RX ADMIN — CEFTRIAXONE 2000 MG: 2 INJECTION, POWDER, FOR SOLUTION INTRAMUSCULAR; INTRAVENOUS at 10:06

## 2024-04-13 RX ADMIN — AMPICILLIN SODIUM 2 G: 2 INJECTION, POWDER, FOR SOLUTION INTRAVENOUS at 17:33

## 2024-04-13 RX ADMIN — AMPICILLIN SODIUM 2 G: 2 INJECTION, POWDER, FOR SOLUTION INTRAVENOUS at 13:10

## 2024-04-13 RX ADMIN — AMLODIPINE BESYLATE 10 MG: 10 TABLET ORAL at 10:04

## 2024-04-13 NOTE — PROGRESS NOTES
" LOS: 2 days     Chief Complaint:  concern for sepsis     Interval History: Afebrile, states she feels minimally better.  Remains on 4 L of oxygen via nasal cannula normally at 2 L at home continues to have cough productive of sputum.  Tolerating antibiotics without nausea vomiting or rash    Vital Signs  Temp:  [97.2 °F (36.2 °C)-98.5 °F (36.9 °C)] 98.2 °F (36.8 °C)  Heart Rate:  [103-146] 146  Resp:  [18-24] 18  BP: (104-141)/(59-90) 104/70    Physical Exam:  General: In no acute distress  Cardiovascular: Irregularly irregular, +1 lower extremity edema  Respiratory: Coarse breath sounds bilaterally  GI: Soft, NT/ND,   Skin: No rashes     Antibiotics:  Ampicillin 2 g IV every 4 hours  Ceftriaxone 2 g IV every 12 hours     Results Review:    Lab Results   Component Value Date    WBC 11.01 (H) 04/13/2024    HGB 8.7 (L) 04/13/2024    HCT 28.6 (L) 04/13/2024    MCV 86.9 04/13/2024     04/13/2024     Lab Results   Component Value Date    GLUCOSE 102 (H) 04/13/2024    BUN 9 04/13/2024    CREATININE 0.78 04/13/2024    EGFRIFNONA 61 08/16/2021    EGFRIFAFRI 86 07/12/2021    BCR 11.5 04/13/2024    CO2 36.2 (H) 04/13/2024    CALCIUM 8.6 04/13/2024    ALBUMIN 3.3 (L) 04/13/2024    AST 23 04/13/2024    ALT 15 04/13/2024     Procalcitonin 0.06    Microbiology:  3/31 RPP: Negative  3/31 BCx: Enterococcus faecalis (not VRE) in 2/2 sets (susceptible to ampicillin)  4/1 MRSA nares PCR: Negative  4/2 blood cultures: Negative to date  4/11 RPP: Negative  4/11 BCx: NGTD x 2  4/12 respiratory culture: \"Rejected\"    4/12 CT of the chest with small bilateral pleural effusions.  Bibasilar opacification right greater than left with some nodular component concerning for multifocal pneumonia possibly aspiration.  Thickening of distal esophagus suggesting esophagitis.  Severe emphysema.      Assessment & Plan   Shortness of breath due to pulmonary edema  Atrial fibrillation with rapid ventricular response  Enterococcus " septicemia  Mitral valve replaced  History of tricuspid valve repair  COPD   Pulmonary hypertension    Patient remains afebrile.  White blood cell count is decreasing.  CT of the chest concerning for pneumonia.  Given negative procalcitonin and wondering if this is more of a aspiration pneumonitis.  Continue ampicillin 2 g IV every 4 hours and ceftriaxone 2 g IV every 12 hours for Enterococcus endocarditis.  April 12 sputum culture was rejected we will see if we can obtain another sputum specimen.  Cultures remain negative to date    We will review the patient's chart tomorrow and plan to see again on Monday.  Please call with questions or concerns

## 2024-04-13 NOTE — PROGRESS NOTES
Name: Paz Browne ADMIT: 2024   : 1955  PCP: Javan Martinez MD    MRN: [id number] LOS: 2 days   AGE/SEX: 68 y.o. female  ROOM: [facility] [place]     Subjective   Subjective   Doing a bit better.  Less short of breath       Objective   Objective   Vital Signs  Temp:  [97.2 °F (36.2 °C)-98.2 °F (36.8 °C)] 97.9 °F (36.6 °C)  Heart Rate:  [103-146] 146  Resp:  [18-24] 18  BP: (104-141)/(55-90) 126/55  SpO2:  [96 %-100 %] 99 %  on  Flow (L/min):  [4-4.5] 4;   Device (Oxygen Therapy): nasal cannula  Body mass index is 21.93 kg/m².  Physical Exam  Vitals reviewed.   Constitutional:       General: She is not in acute distress.     Appearance: She is ill-appearing.   Cardiovascular:      Rate and Rhythm: Tachycardia present. Rhythm irregular.   Pulmonary:      Effort: No respiratory distress.      Breath sounds: Rales present.   Abdominal:      General: There is no distension.      Palpations: Abdomen is soft.      Tenderness: There is no abdominal tenderness.   Musculoskeletal:      Right lower leg: No edema.      Left lower leg: No edema.   Skin:     General: Skin is warm and dry.   Neurological:      Mental Status: She is alert.   Psychiatric:      Comments: Less anxious       Results Review     I reviewed the patient's new clinical results.  Results from last 7 days   Lab Units 24  0550 24  0534 24  1800   WBC 10*3/mm3 11.01* 14.36* 15.16*   HEMOGLOBIN g/dL 8.7* 6.9* 7.2*   PLATELETS 10*3/mm3 248 384 422     Results from last 7 days   Lab Units 24  0550 24  1049 24  0533 24  1800   SODIUM mmol/L 146*  --  144 144   POTASSIUM mmol/L 3.6 3.9 3.6 3.1*   CHLORIDE mmol/L 99  --  102 99   CO2 mmol/L 36.2*  --  33.4* 33.0*   BUN mg/dL 9  --  8 9   CREATININE mg/dL 0.78  --  0.82 0.92   GLUCOSE mg/dL 102*  --  126* 128*   EGFR mL/min/1.73 81.8  --  77.1 67.1     Results from last 7 days   Lab Units 24  0550 24  0533   ALBUMIN g/dL 3.3* 3.5   BILIRUBIN mg/dL 0.5  "0.5   ALK PHOS U/L 66 69   AST (SGOT) U/L 23 24   ALT (SGPT) U/L 15 17     Results from last 7 days   Lab Units 04/13/24  0550 04/12/24  0533 04/11/24  1800   CALCIUM mg/dL 8.6 7.9* 8.2*   ALBUMIN g/dL 3.3* 3.5  --      Results from last 7 days   Lab Units 04/11/24  1938 04/11/24  1800   PROCALCITONIN ng/mL  --  0.06   LACTATE mmol/L 0.9  --      No results found for: \"HGBA1C\", \"POCGLU\"    CT Chest Without Contrast Diagnostic    Result Date: 4/12/2024  1.  Small bilateral pleural effusions with bibasilar pulmonary pacification, right greater than left. Some areas have a somewhat nodular appearance. Findings are most suggestive of multifocal pneumonia, possibly aspiration, and correlation with patient history is recommended to determine most appropriate etiology. Given the nodular appearance, follow-up with chest CT in 6 to 8 weeks is recommended to ensure resolution and exclude the possibility of malignancy. New mediastinal adenopathy enlarged since three 224 can be followed at this time as well. 2.  Thickening of distal esophagus suggestive of esophagitis in appropriate context. Correlation with patient history is recommended with follow-up endoscopy if clinically indicated. 3.  Severe emphysema. 4.  Other findings as above.    This report was finalized on 4/12/2024 5:56 PM by Dr. Papo De La Torre M.D on Workstation: [id number]      XR Chest 1 View    Result Date: 4/11/2024  Findings impression: Background interstitial thickening is present throughout the bilateral lungs with many areas demonstrate a somewhat nodular appearance which cannot be evaluated on plain film radiographs, similar that seen on prior radiographs, and best seen on CT chest 3/2/2024. Please refer to this dictation for further formation and follow-up recommendations.. There is worsening pulmonary pacification within the right lung with probable small bilateral pleural effusions. Cardiac silhouette is mildly enlarged. Constellation findings are " suggestive of worsening pulmonary edema and/or multifocal pneumonia in the appropriate clinical context. Correlation with patient history is recommended with follow-up chest CT if clinically indicated. No pneumothorax is seen. There are median sternotomy wires. A left PICC tip terminates over the expected location of the superior vena cava.    This report was finalized on 4/11/2024 7:03 PM by Dr. Papo De La Torre M.D on Workstation: [id number]       I have personally reviewed all medications:  Scheduled Medications  ampicillin, 2 g, Intravenous, Q4H  [Held by provider] apixaban, 5 mg, Oral, Q12H  atorvastatin, 40 mg, Oral, Daily  budesonide, 0.5 mg, Nebulization, BID - RT  cefTRIAXone (ROCEPHIN) 2,000 mg in sodium chloride 0.9 % 100 mL MBP, 2,000 mg, Intravenous, Q12H  cetirizine, 10 mg, Oral, Daily  cyclobenzaprine, 10 mg, Oral, Nightly  digoxin, 500 mcg, Intravenous, Once  dilTIAZem, 60 mg, Oral, Q6H  furosemide, 40 mg, Intravenous, Q12H  multivitamin with minerals, 1 tablet, Oral, Daily  pantoprazole, 40 mg, Intravenous, Q12H  roflumilast, 500 mcg, Oral, Daily  rOPINIRole, 2 mg, Oral, Nightly  sertraline, 100 mg, Oral, Daily  sodium chloride, 10 mL, Intravenous, Q12H  sodium chloride, 10 mL, Intravenous, Q12H  spironolactone, 25 mg, Oral, Daily    Infusions   Diet  Diet: Cardiac; Healthy Heart (2-3 Na+); Fluid Consistency: Thin (IDDSI 0)    I have personally reviewed:  [x]  Laboratory   [x]  Microbiology   [x]  Radiology   []  EKG/Telemetry  []  Cardiology/Vascular   []  Pathology    []  Records       Assessment/Plan     Active Hospital Problems    Diagnosis  POA    **Acute on chronic diastolic congestive heart failure [I50.33]  Yes    COPD (chronic obstructive pulmonary disease) [J44.9]  Unknown    Septicemia due to enterococcus [A41.81]  Yes    Pneumonia [J18.9]  Yes    Iron deficiency anemia [D50.9]  Yes    Chronic hypoxemic respiratory failure [J96.11]  Yes    PAF (paroxysmal atrial fibrillation) [I48.0]  Yes       Resolved Hospital Problems   No resolved problems to display.       68 y.o. female with COPD, chronic hypoxic respiratory failure, chronic diastolic CHF and A-fib with recent hospitalization for enterococcal bacteremia discharged with IV antibiotics now admitted with worsening dyspnea, likely due to diastolic CHF exacerbation brought about by unctrl AFib.    Suspect acute issues more related to pulmonary edema from uncontrolled A-fib.    -Continue IV Lasix  - Cardiology following.  Diltiazem added for persistent RVR  - Hold Eliquis until bleeding ruled out.  If no further dark stools by tomorrow and hemoglobin stable will restart  - Continue ceftriaxone and ampicillin per ID recommendations.  CT reviewed and does show some nodular infiltrates and bilateral effusions. ?aspiration per ID    Continue Pulmicort for COPD.  Would avoid albuterol if possible due to tachycardia    Anemia, acute on chronic.  Suspect this is mostly due to anemia chronic disease but patient and family do indicate that she may have had some dark stools.  -Good response to 1 unit PRBC.  Continue to monitor  -Continue IV Protonix   - Await stool Hemoccult   - Reviewed iron studies from 4/2, iron sat was 12%, TIBC 226 and total serum iron was 27.  B12 and folate were normal.  Mostly consistent with acute and chronic disease  - Hold Eliquis until bleed is ruled out      SCDs while Eliquis on hold  Dispo TBD  Discussed with  at bedside      Jose Luis Conteh MD  [facility]  04/13/24  13:37 EDT

## 2024-04-13 NOTE — PROGRESS NOTES
Dr. ANGELA Gage    [address]        Patient ID:  Name:  Paz Browne  MRN:  [id number]  1955  68 y.o.  female            CC/Reason for visit: Acute respiratory failure on chronic respiratory failure, sepsis, bacteremia with Enterococcus    Interval hx: Denies shortness of breath at rest.  Requiring supplemental oxygen but always on oxygen at home.  Complains of significant fatigue and weakness  Heart rate still in the 140s.  Currently on 4 L nasal cannula saturating 100%.  Denies hemoptysis.    ROS: No fever, no chest pain, no abdominal pain.  Complains of severe fatigue    I reviewed old medical records.  Past medical history, social history and family history: Unchanged from admission H&P.      Vitals:  Vitals:    04/13/24 0700 04/13/24 0701 04/13/24 0708 04/13/24 1206   BP:   104/70 126/55   BP Location:   Right arm Right arm   Patient Position:   Lying Lying   Pulse:   (!) 146 (!) 146   Resp:  24 18 18   Temp:   98.2 °F (36.8 °C) 97.9 °F (36.6 °C)   TempSrc:   Oral Oral   SpO2:   100% 99%   Weight: 63.5 kg (140 lb)      Height:               Body mass index is 21.93 kg/m².    Intake/Output Summary (Last 24 hours) at 4/13/2024 1549  Last data filed at 4/13/2024 1334  Gross per 24 hour   Intake 413.33 ml   Output 2750 ml   Net -2336.67 ml       Exam:  GEN:  No distress  Alert, oriented x 3.   LUNGS: Some scattered rhonchi bilat, no use of accessory muscles  CV:  Tachycardia, distant heart tones, without murmur, no edema  ABD:  Non tender, no enlarged liver or masses      Scheduled meds:  ampicillin, 2 g, Intravenous, Q4H  [Held by provider] apixaban, 5 mg, Oral, Q12H  atorvastatin, 40 mg, Oral, Daily  budesonide, 0.5 mg, Nebulization, BID - RT  cefTRIAXone (ROCEPHIN) 2,000 mg in sodium chloride 0.9 % 100 mL MBP, 2,000 mg, Intravenous, Q12H  cetirizine, 10 mg, Oral, Daily  cyclobenzaprine, 10 mg, Oral, Nightly  dilTIAZem, 60 mg, Oral, Q6H  furosemide, 40 mg, Intravenous,  Q12H  multivitamin with minerals, 1 tablet, Oral, Daily  pantoprazole, 40 mg, Intravenous, Q12H  roflumilast, 500 mcg, Oral, Daily  rOPINIRole, 2 mg, Oral, Nightly  sertraline, 100 mg, Oral, Daily  sodium chloride, 10 mL, Intravenous, Q12H  sodium chloride, 10 mL, Intravenous, Q12H  spironolactone, 25 mg, Oral, Daily      IV meds:                           Data Review:   I reviewed the patient's medications and new clinical results.    COVID19   Date Value Ref Range Status   04/11/2024 Not Detected Not Detected - Ref. Range Final         Lab Results   Component Value Date    CALCIUM 8.6 04/13/2024    PHOS 3.0 04/06/2024    MG 2.0 04/06/2024    MG 1.9 02/17/2024    MG 2.3 04/11/2023     Results from last 7 days   Lab Units 04/13/24  0550 04/12/24  1049 04/12/24  0534 04/12/24  0533 04/11/24  1800   SODIUM mmol/L 146*  --   --  144 144   POTASSIUM mmol/L 3.6 3.9  --  3.6 3.1*   CHLORIDE mmol/L 99  --   --  102 99   CO2 mmol/L 36.2*  --   --  33.4* 33.0*   BUN mg/dL 9  --   --  8 9   CREATININE mg/dL 0.78  --   --  0.82 0.92   CALCIUM mg/dL 8.6  --   --  7.9* 8.2*   BILIRUBIN mg/dL 0.5  --   --  0.5  --    ALK PHOS U/L 66  --   --  69  --    ALT (SGPT) U/L 15  --   --  17  --    AST (SGOT) U/L 23  --   --  24  --    GLUCOSE mg/dL 102*  --   --  126* 128*   WBC 10*3/mm3 11.01*  --  14.36*  --  15.16*   HEMOGLOBIN g/dL 8.7*  --  6.9*  --  7.2*   PLATELETS 10*3/mm3 248  --  384  --  422   PROBNP pg/mL  --   --   --   --  4,313.0*   PROCALCITONIN ng/mL  --   --   --   --  0.06     Results from last 7 days   Lab Units 04/12/24  0016 04/11/24  1938 04/11/24  1926   BLOODCX   --  No growth at 24 hours No growth at 24 hours   RESPCX  Rejected  --   --            ASSESSMENT:     Acute on chronic diastolic congestive heart failure    PAF (paroxysmal atrial fibrillation)    Chronic hypoxemic respiratory failure    Iron deficiency anemia    Pneumonia    Septicemia due to enterococcus    COPD (chronic obstructive pulmonary  disease)  Volume overload, pulmonary edema  Replaced mitral valve  Pulmonary pretension      PLAN:  Patient and all problems new to me during this admission.  The patient is responding to antibiotics.  She has been in the hospital for 48 hours.  Continue current therapy as per infectious diseases with current antibiotics.  No reason to believe there is any failure to antibiotics at this time.  The patient is at baseline oxygen requirements, saturating 100% on 4 L.  We can start decreasing her oxygen requirements.  We will try 3 L.  Try to avoid hyperoxia.  No need for oxygen saturations above 94% and a person who has COPD.  Continue with some diuretic for probable volume overload, pulmonary edema.  The patient's COPD treatment regimen is maximized.  She needs to start ambulating, participating with physical therapy, trying to get out of bed.    I reviewed the chart and other providers notes and reviewed labs.  Copied text in this note has been reviewed and is accurate as of today      David Gage MD  4/13/2024

## 2024-04-13 NOTE — PROGRESS NOTES
Name: Paz Browne ADMIT: 2024   : 1955  PCP: Javan Martinez MD    MRN: [id number] LOS: 1 days   AGE/SEX: 68 y.o. female  ROOM: [facility]/     Subjective   Subjective   Doing OK but patient and family anxious about situation and readmission. Remains SOA.       Objective   Objective   Vital Signs  Temp:  [97.2 °F (36.2 °C)-98.5 °F (36.9 °C)] 98.2 °F (36.8 °C)  Heart Rate:  [103-150] 103  Resp:  [18-24] 24  BP: (108-141)/(64-90) 141/90  SpO2:  [94 %-100 %] 96 %  on  Flow (L/min):  [4-5] 4;   Device (Oxygen Therapy): nasal cannula  Body mass index is 21.55 kg/m².  Physical Exam  Vitals reviewed.   Constitutional:       General: She is not in acute distress.     Appearance: She is ill-appearing.   Cardiovascular:      Rate and Rhythm: Tachycardia present. Rhythm irregular.   Pulmonary:      Effort: No respiratory distress.      Breath sounds: Rales present.   Abdominal:      General: There is no distension.      Palpations: Abdomen is soft.      Tenderness: There is no abdominal tenderness.   Musculoskeletal:      Right lower leg: No edema.      Left lower leg: No edema.   Skin:     General: Skin is warm and dry.   Neurological:      Mental Status: She is alert.   Psychiatric:      Comments: anxious       Results Review     I reviewed the patient's new clinical results.  Results from last 7 days   Lab Units 24  0534 24  1800   WBC 10*3/mm3 14.36* 15.16*   HEMOGLOBIN g/dL 6.9* 7.2*   PLATELETS 10*3/mm3 384 422     Results from last 7 days   Lab Units 24  1049 24  0533 24  1800 24  0427   SODIUM mmol/L  --  144 144 144   POTASSIUM mmol/L 3.9 3.6 3.1* 4.0   CHLORIDE mmol/L  --  102 99 104   CO2 mmol/L  --  33.4* 33.0* 32.7*   BUN mg/dL  --  8 9 6*   CREATININE mg/dL  --  0.82 0.92 0.73   GLUCOSE mg/dL  --  126* 128* 113*   EGFR mL/min/1.73  --  77.1 67.1 88.6     Results from last 7 days   Lab Units 24  0533 24  0427   ALBUMIN g/dL 3.5 2.9*   BILIRUBIN  . "mg/dL 0.5 0.4   ALK PHOS U/L 69 72   AST (SGOT) U/L 24 18   ALT (SGPT) U/L 17 13     Results from last 7 days   Lab Units 04/12/24  0533 04/11/24  1800 04/06/24  0427   CALCIUM mg/dL 7.9* 8.2* 8.6   ALBUMIN g/dL 3.5  --  2.9*   MAGNESIUM mg/dL  --   --  2.0   PHOSPHORUS mg/dL  --   --  3.0     Results from last 7 days   Lab Units 04/11/24  1938 04/11/24  1800   PROCALCITONIN ng/mL  --  0.06   LACTATE mmol/L 0.9  --      No results found for: \"HGBA1C\", \"POCGLU\"    CT Chest Without Contrast Diagnostic    Result Date: 4/12/2024  1.  Small bilateral pleural effusions with bibasilar pulmonary pacification, right greater than left. Some areas have a somewhat nodular appearance. Findings are most suggestive of multifocal pneumonia, possibly aspiration, and correlation with patient history is recommended to determine most appropriate etiology. Given the nodular appearance, follow-up with chest CT in 6 to 8 weeks is recommended to ensure resolution and exclude the possibility of malignancy. New mediastinal adenopathy enlarged since three 224 can be followed at this time as well. 2.  Thickening of distal esophagus suggestive of esophagitis in appropriate context. Correlation with patient history is recommended with follow-up endoscopy if clinically indicated. 3.  Severe emphysema. 4.  Other findings as above.    This report was finalized on 4/12/2024 5:56 PM by Dr. Papo De La Torre M.D on Workstation: [id number]      XR Chest 1 View    Result Date: 4/11/2024  Findings impression: Background interstitial thickening is present throughout the bilateral lungs with many areas demonstrate a somewhat nodular appearance which cannot be evaluated on plain film radiographs, similar that seen on prior radiographs, and best seen on CT chest 3/2/2024. Please refer to this dictation for further formation and follow-up recommendations.. There is worsening pulmonary pacification within the right lung with probable small bilateral pleural " effusions. Cardiac silhouette is mildly enlarged. Constellation findings are suggestive of worsening pulmonary edema and/or multifocal pneumonia in the appropriate clinical context. Correlation with patient history is recommended with follow-up chest CT if clinically indicated. No pneumothorax is seen. There are median sternotomy wires. A left PICC tip terminates over the expected location of the superior vena cava.    This report was finalized on 4/11/2024 7:03 PM by Dr. Papo De La Torre M.D on Workstation: [id number]       I have personally reviewed all medications:  Scheduled Medications  amLODIPine, 10 mg, Oral, Daily  ampicillin, 2 g, Intravenous, Q4H  [Held by provider] apixaban, 5 mg, Oral, Q12H  atorvastatin, 40 mg, Oral, Daily  budesonide, 0.5 mg, Nebulization, BID - RT  carvedilol, 6.25 mg, Oral, BID With Meals  cefTRIAXone (ROCEPHIN) 2,000 mg in sodium chloride 0.9 % 100 mL MBP, 2,000 mg, Intravenous, Q12H  cetirizine, 10 mg, Oral, Daily  cyclobenzaprine, 10 mg, Oral, Nightly  furosemide, 40 mg, Intravenous, Q12H  lisinopril, 40 mg, Oral, Daily  multivitamin with minerals, 1 tablet, Oral, Daily  pantoprazole, 40 mg, Oral, Q AM  potassium chloride, 20 mEq, Oral, BID With Meals  roflumilast, 500 mcg, Oral, Daily  rOPINIRole, 2 mg, Oral, Nightly  sertraline, 100 mg, Oral, Daily  sodium chloride, 10 mL, Intravenous, Q12H  sodium chloride, 10 mL, Intravenous, Q12H    Infusions   Diet  Diet: Cardiac; Healthy Heart (2-3 Na+); Fluid Consistency: Thin (IDDSI 0)    I have personally reviewed:  [x]  Laboratory   [x]  Microbiology   [x]  Radiology   []  EKG/Telemetry  []  Cardiology/Vascular   []  Pathology    []  Records       Assessment/Plan     Active Hospital Problems    Diagnosis  POA    COPD (chronic obstructive pulmonary disease) [J44.9]  Unknown    Acute on chronic diastolic congestive heart failure [I50.33]  Yes    Septicemia due to enterococcus [A41.81]  Yes    Pneumonia [J18.9]  Yes    Iron deficiency  anemia [D50.9]  Yes    Chronic hypoxemic respiratory failure [J96.11]  Yes    PAF (paroxysmal atrial fibrillation) [I48.0]  Yes      Resolved Hospital Problems   No resolved problems to display.       68 y.o. female with COPD, chronic hypoxic respiratory failure, chronic diastolic CHF and A-fib with recent hospitalization for enterococcal bacteremia discharged with IV antibiotics now admitted with worsening dyspnea, likely due to diastolic CHF exacerbation brought about by unctrl AFib.    Suspect acute issues more related to pulmonary edema from uncontrolled A-fib.  Stopped IV fluids and continue IV Lasix.  - Cardiology consulted.  Rate can further rhythm management  - Hold Eliquis as detailed below  - Continue ceftriaxone and ampicillin per ID recommendations.  CT reviewed and does show some nodular infiltrates and bilateral effusions  - History of COPD but really not extensively wheezing at this time.  Continue Pulmicort nebs    Anemia, acute on chronic.  Suspect this is mostly due to anemia chronic disease but patient and family do indicate that she may have had some dark stools.  - Will switch Protonix to IV  - Monitor hemoglobin.  Transfuse 1 unit now  - Check Hemoccult stools  - Reviewed iron studies from 4/2, iron sat was 12%, TIBC 226 and total serum iron was 27.  B12 and folate were normal.  Mostly consistent with acute and chronic disease  - Hemoglobin was running in the low 7 range when she was here before  - Hold Eliquis until bleed is ruled out    SCDs while Eliquis on hold  Dispo TBD      Jose Luis Conteh MD  [facility]  04/12/24  23:51 EDT

## 2024-04-13 NOTE — PROGRESS NOTES
"CC:.  Atrial fibrillation    Interval History: No new acute events overnight      Vital Signs  Temp:  [97.2 °F (36.2 °C)-98.2 °F (36.8 °C)] 97.9 °F (36.6 °C)  Heart Rate:  [103-146] 146  Resp:  [18-24] 18  BP: (104-141)/(55-90) 126/55    Intake/Output Summary (Last 24 hours) at 4/13/2024 1235  Last data filed at 4/13/2024 0700  Gross per 24 hour   Intake 413.33 ml   Output 1750 ml   Net -1336.67 ml     Flowsheet Rows      Flowsheet Row First Filed Value   Admission Height 170.2 cm (67\") Documented at 04/11/2024 1752   Admission Weight 59 kg (130 lb) Documented at 04/11/2024 1752            PHYSICAL EXAM:  General: No acute distress  Resp: Bilateral decreased air entry and wheezing   CV: Irregularly irregular rate tachycardic, NL PMI, NL S1 and S2, no Murmur, no gallop, no rub, No JVD.   ABD:Nl sounds, no masses or tenderness, nondistended, no guarding or rebound  Neuro: alert,cooperative and oriented  Extr:Normal pedal pulses, No edema or cyanosis, moves all extremities      Results Review:    Results from last 7 days   Lab Units 04/13/24  0550   SODIUM mmol/L 146*   POTASSIUM mmol/L 3.6   CHLORIDE mmol/L 99   CO2 mmol/L 36.2*   BUN mg/dL 9   CREATININE mg/dL 0.78   GLUCOSE mg/dL 102*   CALCIUM mg/dL 8.6         Results from last 7 days   Lab Units 04/13/24  0550   WBC 10*3/mm3 11.01*   HEMOGLOBIN g/dL 8.7*   HEMATOCRIT % 28.6*   PLATELETS 10*3/mm3 248                     I reviewed the patient's new clinical results.  I personally viewed and interpreted the patient's EKG/Telemetry data        Medication Review:   Meds reviewed         Assessment/Plan    Acute on chronic hypoxic respiratory failure - from CHF and ?aspiration pneumonia   Acute on chronic diastolic heart failure precipitated by atrial fibrillation  Paroxysmal atrial fibrillation-she had right and left cryo maze with closure of left atrial appendage in 2014 when  mitral valve replaced.  She had A-fib following surgery and temporarily on Coumadin that " was discontinued because of GI bleed.  Because of recurrent A-fib she was recently started on Eliquis with worsened anemia and currently on hold  History of bioprosthetic mitral valve/repair tricuspid valve.  Recent enterococcus bacteremia on antibiotics per ID.  CHIO did not show new vegetation  Prior history of repaired tricuspid valve with moderate to severe tricuspid regurgitation and pulmonary potential  Moderate pulmonary tension  Essential hypertension  Blood loss anemia: Eliquis on hold,  received PRBC    Still significantly elevated heart rate in the 150s.  She reports feeling tired.  Also bilateral decreased air entry and wheezing from COPD  Hold blood pressure meds to give more room for AV mateus blockers.  Give digoxin 500 mg IV x 1.  Start  diltiazem 60 mg every 6 hours..  Start  diltiazem drip if heart rate is now controlled.  Continue IV diuresis with close monitoring of electrolytes, ins and outs.    I discussed patient with the nurse      Jeremiah Saab MD  04/13/24  12:35 EDT

## 2024-04-14 ENCOUNTER — APPOINTMENT (OUTPATIENT)
Dept: GENERAL RADIOLOGY | Facility: HOSPITAL | Age: 69
DRG: 871 | End: 2024-04-14
Payer: MEDICARE

## 2024-04-14 LAB
ANION GAP SERPL CALCULATED.3IONS-SCNC: 12.7 MMOL/L (ref 5–15)
BACTERIA SPEC RESP CULT: NORMAL
BUN SERPL-MCNC: 8 MG/DL (ref 8–23)
BUN/CREAT SERPL: 11.1 (ref 7–25)
CALCIUM SPEC-SCNC: 9 MG/DL (ref 8.6–10.5)
CHLORIDE SERPL-SCNC: 95 MMOL/L (ref 98–107)
CO2 SERPL-SCNC: 34.3 MMOL/L (ref 22–29)
CREAT SERPL-MCNC: 0.72 MG/DL (ref 0.57–1)
EGFRCR SERPLBLD CKD-EPI 2021: 90.1 ML/MIN/1.73
GLUCOSE SERPL-MCNC: 114 MG/DL (ref 65–99)
GRAM STN SPEC: NORMAL
POTASSIUM SERPL-SCNC: 3.5 MMOL/L (ref 3.5–5.2)
PROCALCITONIN SERPL-MCNC: 0.05 NG/ML (ref 0–0.25)
SODIUM SERPL-SCNC: 142 MMOL/L (ref 136–145)

## 2024-04-14 PROCEDURE — 84145 PROCALCITONIN (PCT): CPT | Performed by: INTERNAL MEDICINE

## 2024-04-14 PROCEDURE — 25010000002 METHYLPREDNISOLONE PER 40 MG: Performed by: HOSPITALIST

## 2024-04-14 PROCEDURE — 25010000002 FUROSEMIDE PER 20 MG: Performed by: INTERNAL MEDICINE

## 2024-04-14 PROCEDURE — 94799 UNLISTED PULMONARY SVC/PX: CPT

## 2024-04-14 PROCEDURE — 71045 X-RAY EXAM CHEST 1 VIEW: CPT

## 2024-04-14 PROCEDURE — 94761 N-INVAS EAR/PLS OXIMETRY MLT: CPT

## 2024-04-14 PROCEDURE — 99222 1ST HOSP IP/OBS MODERATE 55: CPT | Performed by: INTERNAL MEDICINE

## 2024-04-14 PROCEDURE — 25010000002 AMPICILLIN PER 500 MG: Performed by: NURSE PRACTITIONER

## 2024-04-14 PROCEDURE — 97165 OT EVAL LOW COMPLEX 30 MIN: CPT

## 2024-04-14 PROCEDURE — 97530 THERAPEUTIC ACTIVITIES: CPT

## 2024-04-14 PROCEDURE — 25010000002 ONDANSETRON PER 1 MG: Performed by: INTERNAL MEDICINE

## 2024-04-14 PROCEDURE — 99232 SBSQ HOSP IP/OBS MODERATE 35: CPT | Performed by: INTERNAL MEDICINE

## 2024-04-14 PROCEDURE — 80048 BASIC METABOLIC PNL TOTAL CA: CPT | Performed by: HOSPITALIST

## 2024-04-14 PROCEDURE — 25010000002 CEFTRIAXONE PER 250 MG: Performed by: INTERNAL MEDICINE

## 2024-04-14 PROCEDURE — 94664 DEMO&/EVAL PT USE INHALER: CPT

## 2024-04-14 RX ORDER — FUROSEMIDE 10 MG/ML
60 INJECTION INTRAMUSCULAR; INTRAVENOUS EVERY 12 HOURS
Status: DISCONTINUED | OUTPATIENT
Start: 2024-04-15 | End: 2024-04-15

## 2024-04-14 RX ORDER — METHYLPREDNISOLONE SODIUM SUCCINATE 40 MG/ML
40 INJECTION, POWDER, LYOPHILIZED, FOR SOLUTION INTRAMUSCULAR; INTRAVENOUS EVERY 12 HOURS
Status: DISCONTINUED | OUTPATIENT
Start: 2024-04-14 | End: 2024-04-19

## 2024-04-14 RX ORDER — FUROSEMIDE 10 MG/ML
80 INJECTION INTRAMUSCULAR; INTRAVENOUS ONCE
Status: COMPLETED | OUTPATIENT
Start: 2024-04-14 | End: 2024-04-14

## 2024-04-14 RX ORDER — FUROSEMIDE 10 MG/ML
60 INJECTION INTRAMUSCULAR; INTRAVENOUS DAILY
Status: DISCONTINUED | OUTPATIENT
Start: 2024-04-14 | End: 2024-04-14

## 2024-04-14 RX ORDER — FUROSEMIDE 40 MG/1
40 TABLET ORAL
Status: DISCONTINUED | OUTPATIENT
Start: 2024-04-14 | End: 2024-04-14

## 2024-04-14 RX ADMIN — AMPICILLIN SODIUM 2 G: 2 INJECTION, POWDER, FOR SOLUTION INTRAVENOUS at 20:14

## 2024-04-14 RX ADMIN — ALBUTEROL SULFATE 2.5 MG: 2.5 SOLUTION RESPIRATORY (INHALATION) at 20:39

## 2024-04-14 RX ADMIN — AMPICILLIN SODIUM 2 G: 2 INJECTION, POWDER, FOR SOLUTION INTRAVENOUS at 12:12

## 2024-04-14 RX ADMIN — FUROSEMIDE 60 MG: 10 INJECTION, SOLUTION INTRAMUSCULAR; INTRAVENOUS at 14:32

## 2024-04-14 RX ADMIN — IPRATROPIUM BROMIDE AND ALBUTEROL SULFATE 3 ML: .5; 3 SOLUTION RESPIRATORY (INHALATION) at 13:48

## 2024-04-14 RX ADMIN — AMPICILLIN SODIUM 2 G: 2 INJECTION, POWDER, FOR SOLUTION INTRAVENOUS at 00:48

## 2024-04-14 RX ADMIN — CETIRIZINE HYDROCHLORIDE 10 MG: 10 TABLET ORAL at 08:54

## 2024-04-14 RX ADMIN — ALBUTEROL SULFATE 2.5 MG: 2.5 SOLUTION RESPIRATORY (INHALATION) at 05:33

## 2024-04-14 RX ADMIN — CEFTRIAXONE 2000 MG: 2 INJECTION, POWDER, FOR SOLUTION INTRAMUSCULAR; INTRAVENOUS at 08:55

## 2024-04-14 RX ADMIN — DILTIAZEM HYDROCHLORIDE 60 MG: 60 TABLET, FILM COATED ORAL at 00:48

## 2024-04-14 RX ADMIN — SPIRONOLACTONE 25 MG: 25 TABLET, FILM COATED ORAL at 08:55

## 2024-04-14 RX ADMIN — Medication 1 TABLET: at 08:55

## 2024-04-14 RX ADMIN — METHYLPREDNISOLONE SODIUM SUCCINATE 40 MG: 40 INJECTION, POWDER, FOR SOLUTION INTRAMUSCULAR; INTRAVENOUS at 14:33

## 2024-04-14 RX ADMIN — BUDESONIDE 0.5 MG: 0.5 INHALANT RESPIRATORY (INHALATION) at 07:00

## 2024-04-14 RX ADMIN — FUROSEMIDE 80 MG: 10 INJECTION, SOLUTION INTRAMUSCULAR; INTRAVENOUS at 20:14

## 2024-04-14 RX ADMIN — AMPICILLIN SODIUM 2 G: 2 INJECTION, POWDER, FOR SOLUTION INTRAVENOUS at 04:47

## 2024-04-14 RX ADMIN — PANTOPRAZOLE SODIUM 40 MG: 40 INJECTION, POWDER, FOR SOLUTION INTRAVENOUS at 08:55

## 2024-04-14 RX ADMIN — DILTIAZEM HYDROCHLORIDE 60 MG: 60 TABLET, FILM COATED ORAL at 18:08

## 2024-04-14 RX ADMIN — HYDROCODONE BITARTRATE AND ACETAMINOPHEN 1 TABLET: 7.5; 325 TABLET ORAL at 20:20

## 2024-04-14 RX ADMIN — CYCLOBENZAPRINE 10 MG: 10 TABLET, FILM COATED ORAL at 20:13

## 2024-04-14 RX ADMIN — PANTOPRAZOLE SODIUM 40 MG: 40 INJECTION, POWDER, FOR SOLUTION INTRAVENOUS at 20:15

## 2024-04-14 RX ADMIN — AMPICILLIN SODIUM 2 G: 2 INJECTION, POWDER, FOR SOLUTION INTRAVENOUS at 08:55

## 2024-04-14 RX ADMIN — Medication 3 MG: at 20:20

## 2024-04-14 RX ADMIN — CEFTRIAXONE 2000 MG: 2 INJECTION, POWDER, FOR SOLUTION INTRAMUSCULAR; INTRAVENOUS at 20:16

## 2024-04-14 RX ADMIN — Medication 10 ML: at 20:15

## 2024-04-14 RX ADMIN — DILTIAZEM HYDROCHLORIDE 60 MG: 60 TABLET, FILM COATED ORAL at 05:42

## 2024-04-14 RX ADMIN — SERTRALINE 100 MG: 100 TABLET, FILM COATED ORAL at 08:55

## 2024-04-14 RX ADMIN — BUDESONIDE 0.5 MG: 0.5 INHALANT RESPIRATORY (INHALATION) at 20:39

## 2024-04-14 RX ADMIN — ROFLUMILAST 500 MCG: 500 TABLET ORAL at 08:55

## 2024-04-14 RX ADMIN — DILTIAZEM HYDROCHLORIDE 60 MG: 60 TABLET, FILM COATED ORAL at 14:32

## 2024-04-14 RX ADMIN — ATORVASTATIN CALCIUM 40 MG: 20 TABLET, FILM COATED ORAL at 08:55

## 2024-04-14 RX ADMIN — HYDROCODONE BITARTRATE AND ACETAMINOPHEN 1 TABLET: 7.5; 325 TABLET ORAL at 08:55

## 2024-04-14 RX ADMIN — AMPICILLIN SODIUM 2 G: 2 INJECTION, POWDER, FOR SOLUTION INTRAVENOUS at 16:16

## 2024-04-14 RX ADMIN — Medication 10 ML: at 20:16

## 2024-04-14 RX ADMIN — ONDANSETRON 4 MG: 2 INJECTION INTRAMUSCULAR; INTRAVENOUS at 10:54

## 2024-04-14 RX ADMIN — ROPINIROLE 2 MG: 2 TABLET, FILM COATED ORAL at 20:13

## 2024-04-14 NOTE — PROGRESS NOTES
Name: Paz Browne ADMIT: 2024   : 1955  PCP: Javan Martinez MD    MRN: [id number] LOS: 3 days   AGE/SEX: 68 y.o. female  ROOM: [facility]     Subjective   Subjective   Doing about the same. SOA with exertion and having loose, dark stools       Objective   Objective   Vital Signs  Temp:  [97.5 °F (36.4 °C)-97.9 °F (36.6 °C)] 97.7 °F (36.5 °C)  Heart Rate:  [69-93] 93  Resp:  [18-20] 18  BP: (114-161)/(46-71) 125/71  SpO2:  [93 %-100 %] 97 %  on  Flow (L/min):  [4] 4;   Device (Oxygen Therapy): nasal cannula  Body mass index is 22.01 kg/m².  Physical Exam  Vitals reviewed.   Constitutional:       General: She is not in acute distress.     Appearance: She is ill-appearing.   Cardiovascular:      Rate and Rhythm: Normal rate. Rhythm irregular.   Pulmonary:      Effort: No respiratory distress.      Breath sounds: Wheezing and rales present.   Abdominal:      General: There is no distension.      Palpations: Abdomen is soft.      Tenderness: There is no abdominal tenderness.   Musculoskeletal:      Right lower leg: No edema.      Left lower leg: No edema.   Skin:     General: Skin is warm and dry.   Neurological:      Mental Status: She is alert.   Psychiatric:      Comments: Less anxious       Results Review     I reviewed the patient's new clinical results.  Results from last 7 days   Lab Units 24  0550 24  0534 24  1800   WBC 10*3/mm3 11.01* 14.36* 15.16*   HEMOGLOBIN g/dL 8.7* 6.9* 7.2*   PLATELETS 10*3/mm3 248 384 422     Results from last 7 days   Lab Units 24  0545 247 24  0550 24  1049 24  0533 24  1800   SODIUM mmol/L 142  --  146*  --  144 144   POTASSIUM mmol/L 3.5 4.3 3.6 3.9 3.6 3.1*   CHLORIDE mmol/L 95*  --  99  --  102 99   CO2 mmol/L 34.3*  --  36.2*  --  33.4* 33.0*   BUN mg/dL 8  --  9  --  8 9   CREATININE mg/dL 0.72  --  0.78  --  0.82 0.92   GLUCOSE mg/dL 114*  --  102*  --  126* 128*   EGFR mL/min/1.73 90.1  --  81.8   "--  77.1 67.1     Results from last 7 days   Lab Units 04/13/24  0550 04/12/24  0533   ALBUMIN g/dL 3.3* 3.5   BILIRUBIN mg/dL 0.5 0.5   ALK PHOS U/L 66 69   AST (SGOT) U/L 23 24   ALT (SGPT) U/L 15 17     Results from last 7 days   Lab Units 04/14/24  0545 04/13/24 2047 04/13/24  0550 04/12/24  0533 04/11/24  1800   CALCIUM mg/dL 9.0  --  8.6 7.9* 8.2*   ALBUMIN g/dL  --   --  3.3* 3.5  --    MAGNESIUM mg/dL  --  2.0  --   --   --    PHOSPHORUS mg/dL  --  3.0  --   --   --      Results from last 7 days   Lab Units 04/14/24  0545 04/11/24 1938 04/11/24  1800   PROCALCITONIN ng/mL 0.05  --  0.06   LACTATE mmol/L  --  0.9  --      No results found for: \"HGBA1C\", \"POCGLU\"    CT Chest Without Contrast Diagnostic    Result Date: 4/12/2024  1.  Small bilateral pleural effusions with bibasilar pulmonary pacification, right greater than left. Some areas have a somewhat nodular appearance. Findings are most suggestive of multifocal pneumonia, possibly aspiration, and correlation with patient history is recommended to determine most appropriate etiology. Given the nodular appearance, follow-up with chest CT in 6 to 8 weeks is recommended to ensure resolution and exclude the possibility of malignancy. New mediastinal adenopathy enlarged since three 224 can be followed at this time as well. 2.  Thickening of distal esophagus suggestive of esophagitis in appropriate context. Correlation with patient history is recommended with follow-up endoscopy if clinically indicated. 3.  Severe emphysema. 4.  Other findings as above.    This report was finalized on 4/12/2024 5:56 PM by Dr. Papo De La Torre M.D on Workstation: [id number]       I have personally reviewed all medications:  Scheduled Medications  ampicillin, 2 g, Intravenous, Q4H  [Held by provider] apixaban, 5 mg, Oral, Q12H  atorvastatin, 40 mg, Oral, Daily  budesonide, 0.5 mg, Nebulization, BID - RT  cefTRIAXone (ROCEPHIN) 2,000 mg in sodium chloride 0.9 % 100 mL MBP, 2,000 mg, " Intravenous, Q12H  cetirizine, 10 mg, Oral, Daily  cyclobenzaprine, 10 mg, Oral, Nightly  dilTIAZem, 60 mg, Oral, Q6H  multivitamin with minerals, 1 tablet, Oral, Daily  pantoprazole, 40 mg, Intravenous, Q12H  roflumilast, 500 mcg, Oral, Daily  rOPINIRole, 2 mg, Oral, Nightly  sertraline, 100 mg, Oral, Daily  sodium chloride, 10 mL, Intravenous, Q12H  sodium chloride, 10 mL, Intravenous, Q12H  spironolactone, 25 mg, Oral, Daily    Infusions   Diet  Diet: Cardiac; Healthy Heart (2-3 Na+); Fluid Consistency: Thin (IDDSI 0)    I have personally reviewed:  [x]  Laboratory   [x]  Microbiology   [x]  Radiology   []  EKG/Telemetry  []  Cardiology/Vascular   []  Pathology    []  Records       Assessment/Plan     Active Hospital Problems    Diagnosis  POA    **Acute on chronic diastolic congestive heart failure [I50.33]  Yes    COPD (chronic obstructive pulmonary disease) [J44.9]  Unknown    Septicemia due to enterococcus [A41.81]  Yes    Pneumonia [J18.9]  Yes    Iron deficiency anemia [D50.9]  Yes    Chronic hypoxemic respiratory failure [J96.11]  Yes    PAF (paroxysmal atrial fibrillation) [I48.0]  Yes      Resolved Hospital Problems   No resolved problems to display.       68 y.o. female with COPD, chronic hypoxic respiratory failure, chronic diastolic CHF and A-fib with recent hospitalization for enterococcal bacteremia discharged with IV antibiotics now admitted with worsening dyspnea, likely due to diastolic CHF exacerbation brought about by unctrl AFib.    Suspect acute issues more related to pulmonary edema from uncontrolled A-fib.    - IV furosemide .  Will reorder home dose now that rate is better controlled  - Cardiology following.  Diltiazem added for persistent RVR, better today  - Hold Eliquis as discussed below  - Continue ceftriaxone and ampicillin per ID recommendations.  CT reviewed and does show some nodular infiltrates and bilateral effusions. ?aspiration per ID  - Follow-up sputum  culture    Continue Pulmicort for COPD.  Would avoid albuterol if possible due to tachycardia though she does still have some mild wheezing    Anemia, acute on chronic.  Recent iron studies with iron sat 12% but TIBC only 226.  Stool obtained last night was heme positive  - Consult GI given ongoing indication for anticoagulation  - CBC not ordered this morning.  Will check now  -Good response to 1 unit PRBC.  Continue to monitor  -Continue IV Protonix   - Holding Eliquis until bleed is ruled out      SCDs while Eliquis on hold  Dispo TBD  Discussed with  at bedside again today      Jose Luis Conteh MD  [facility]  04/14/24  13:27 EDT

## 2024-04-14 NOTE — PLAN OF CARE
Goal Outcome Evaluation:  Plan of Care Reviewed With: patient, spouse           Outcome Evaluation: Pt admit for heart and respiratory failure. Pt lives at home with spouse and was using walker for mobility. Pt presents to OT in bed, room dark,  present. With encouragement pt moves OOB, walks around to bed to sit up in chair with close SBA/CGA at walker.  OT ed benefit of activity and pacing as pt gets SOA easily.  OT ed on pursed lip breathing tech.   Pt with functional B UE.   Will cont to follow for skilled OT while in acute care.  Pt plans to return home with spouse assist as needed.      Anticipated Discharge Disposition (OT): home with assist

## 2024-04-14 NOTE — THERAPY TREATMENT NOTE
Patient Name: Paz Browne  : 1955    MRN: [id number]                              Today's Date: 2024       Admit Date: 2024    Visit Dx:     ICD-10-CM ICD-9-CM   1. Sepsis, due to unspecified organism, unspecified whether acute organ dysfunction present  A41.9 038.9     995.91   2. Acute on chronic congestive heart failure, unspecified heart failure type  I50.9 428.0   3. Atrial fibrillation with RVR  I48.91 427.31   4. History of atrial fibrillation  Z86.79 V12.59   5. Chronic anticoagulation  Z79.01 V58.61   6. Hypoxia  R09.02 799.02   7. History of COPD  Z87.09 V12.69   8. History of endocarditis  Z86.79 V12.59     Patient Active Problem List   Diagnosis    PAF (paroxysmal atrial fibrillation)    Hypertension    Hyperlipidemia    Pain medication agreement    Hiatal hernia    Primary osteoarthritis involving multiple joints    Pulmonary hypertension    S/P TVR (tricuspid valve repair)    Pulmonary nodule    Restless leg syndrome    Chronic low back pain    Dysplastic polyp of colon    History of mitral valve replacement with tissue graft    Chronic hypoxemic respiratory failure    Anemia    Celiac artery stenosis    Intertrigo    Abnormal EKG    Muscle spasms of both lower extremities    Iron deficiency anemia    Adenomatous polyp of colon    Gastroesophageal reflux disease without esophagitis    Headache    History of endocarditis    Pneumonia of both lower lobes due to infectious organism    Peptic ulcer disease    Peripheral vascular disease    Hyperlipidemia    Hyperglycemia    COPD with acute exacerbation    Chronic diastolic CHF (congestive heart failure)    Chronic bilateral low back pain    COPD exacerbation    Leukocytosis    Elevated troponin    Pneumonia    Acute-on-chronic respiratory failure    Septicemia due to enterococcus    Acute on chronic diastolic congestive heart failure    Sepsis    COPD (chronic obstructive pulmonary disease)     Past Medical History:   Diagnosis Date     VAN (acute kidney injury)     Anemia     Asthma     Atrial flutter     cardioversion    Cataract     Celiac artery stenosis     Chronic respiratory failure with hypoxia     Colon polyp     COPD (chronic obstructive pulmonary disease)     GI bleed     Hiatal hernia     History of CHF (congestive heart failure)     due to MR    History of home oxygen therapy     3 lpm NC    History of mitral valve replacement with tissue graft     Hyperlipidemia     Hypertension     Infectious viral hepatitis     B    Intertrigo     Long term (current) use of anticoagulants     Mitral regurgitation     s/p tissue MVR    PAF (paroxysmal atrial fibrillation)     s/p MAZE    Pneumonia     Pulmonary hypertension     S/P TVR (tricuspid valve repair) 7/7/2016     Past Surgical History:   Procedure Laterality Date    CARDIAC CATHETERIZATION  09/01/2014    Right dominant systemt, normal coronary arteries.     CARDIAC CATHETERIZATION Left 6/10/2016    Procedure: Cardiac catheterization;  Surgeon: Sergei Hall MD;  Location: [facility] CATH INVASIVE LOCATION;  Service:     CARDIAC CATHETERIZATION N/A 6/10/2016    Procedure: Right Heart Cath;  Surgeon: Sergei Hall MD;  [facility]ATH [facility]ASIVE [facility];  Service:     CATARACT EXTRACTION      COLONOSCOPY      COLONOSCOPY N/A 8/4/2017    Procedure: COLONOSCOPY TO CECUM/TI WITH POLYPECTOMY ( COLD BX);  Surgeon: Cleveland Devine MD;  Location: [facility] ENDOSCOPY;  Service:     COLONOSCOPY N/A 8/10/2017    Procedure: COLONOSCOPY to cecum and TI with 2 clips placed at transverse;  Surgeon: Earnest PALOMO MD;  Location: [facility] ENDOSCOPY;  Service:     COLONOSCOPY N/A 12/22/2017    Procedure: COLONOSCOPY INTO CECUM WITH COLD POLYPECTOMIES;  Surgeon: Cleveland Devine MD;  Location: [facility] ENDOSCOPY;  Service:     COLONOSCOPY N/A 5/17/2021    Procedure: COLONOSCOPY to cecum;  Surgeon: Cleveland Devine MD;  Location: [facility] ENDOSCOPY;  Service: Gastroenterology;  Laterality: N/A;  Pre: Fe deficency anemia, h/x  of polyps  Post: fair prep, normal    ENDOSCOPY N/A 8/17/2017    Procedure: ESOPHAGOGASTRODUODENOSCOPY;  Surgeon: Porsha Ruby MD;  Location: [facility] ENDOSCOPY;  Service:     ENDOSCOPY N/A 12/22/2017    Procedure: ESOPHAGOGASTRODUODENOSCOPY WITH BIOPSIES;  Surgeon: Cleveland Devine MD;  Location: [facility] ENDOSCOPY;  Service:     ENDOSCOPY N/A 5/17/2021    Procedure: ESOPHAGOGASTRODUODENOSCOPY  with biopsies;  Surgeon: Cleveland Devine MD;  Location: [facility] ENDOSCOPY;  Service: Gastroenterology;  Laterality: N/A;  Pre: Fe deficency anemia, nausea, heme positive stool   Post: gastritis, sloughing of distal esophagus mucosa    GALLBLADDER SURGERY      HEMORRHOIDECTOMY      HYSTERECTOMY      KIDNEY SURGERY  04/22/2013    Stent placement    MAZE PROCEDURE      MITRAL VALVE REPLACEMENT      TONSILLECTOMY      TRICUSPID VALVE REPLACEMENT        General Information       Row Name 04/14/24 1343          Physical Therapy Time and Intention    Document Type therapy note (daily note)  -MS     Mode of Treatment physical therapy;individual therapy  -MS       Row Name 04/14/24 1349          General Information    Patient Profile Reviewed yes  -MS     Existing Precautions/Restrictions fall;oxygen therapy device and L/min   Exit alarm  -MS     Barriers to Rehab none identified  -MS       Row Name 04/14/24 1347          Cognition    Orientation Status (Cognition) oriented x 3  -MS       Row Name 04/14/24 1344          Safety Issues, Functional Mobility    Comment, Safety Issues/Impairments (Mobility) Gait belt used for safety.  -MS               User Key  (r) = Recorded By, (t) = Taken By, (c) = Cosigned By      Initials Name Provider Type    MS Jose Luis Latif, PT Physical Therapist                   Mobility       Row Name 04/14/24 1348          Bed Mobility    Supine-Sit Preble (Bed Mobility) contact guard  -MS       Row Name 04/14/24 1346          Sit-Stand Transfer    Sit-Stand Preble (Transfers) minimum assist (75%  patient effort);2 person assist  -MS     Assistive Device (Sit-Stand Transfers) --  HHA x 2  -MS       Row Name 04/14/24 1347          Gait/Stairs (Locomotion)    Gering Level (Gait) minimum assist (75% patient effort);2 person assist  -MS     Assistive Device (Gait) --  HHA x 2  -MS     Distance in Feet (Gait) 12  -MS     Deviations/Abnormal Patterns (Gait) kassidy decreased  -MS     Bilateral Gait Deviations forward flexed posture  -MS     Comment, (Gait/Stairs) Verbal/tactile cues given for posture correction. Limited in gait distance due to fatigue, SOA, and bathroom issues (BM).  -MS               User Key  (r) = Recorded By, (t) = Taken By, (c) = Cosigned By      Initials Name Provider Type    Jose Luis Rodríguez, PT Physical Therapist                   Obj/Interventions       Row Name 04/14/24 1348          Motor Skills    Therapeutic Exercise --  BLE ther. ex. program x 10 reps completed (Ankle pumps, Hip Flexion, LAQ's)  -MS               User Key  (r) = Recorded By, (t) = Taken By, (c) = Cosigned By      Initials Name Provider Type    Jose Luis Rodríguez, PT Physical Therapist                   Goals/Plan    No documentation.                  Clinical Impression       Row Name 04/14/24 1349          Pain    Pretreatment Pain Rating 0/10 - no pain  -MS     Posttreatment Pain Rating 0/10 - no pain  -MS       Row Name 04/14/24 1349          Positioning and Restraints    Pre-Treatment Position in bed  -MS     Post Treatment Position bsc  -MS     On BS commode notified nsg;sitting;call light within reach;encouraged to call for assist;with family/caregiver  All lines intact.  -MS               User Key  (r) = Recorded By, (t) = Taken By, (c) = Cosigned By      Initials Name Provider Type    Jose Luis Rodríguez, PT Physical Therapist                   Outcome Measures       Row Name 04/14/24 1349          How much help from another person do you currently need...    Turning from your back to your side  while in flat bed without using bedrails? 3  -MS     Moving from lying on back to sitting on the side of a flat bed without bedrails? 3  -MS     Moving to and from a bed to a chair (including a wheelchair)? 2  -MS     Standing up from a chair using your arms (e.g., wheelchair, bedside chair)? 2  -MS     Climbing 3-5 steps with a railing? 2  -MS     To walk in hospital room? 2  -MS     AM-PAC 6 Clicks Score (PT) 14  -MS     Highest Level of Mobility Goal 4 --> Transfer to chair/commode  -MS       Row Name 04/14/24 1349 04/14/24 1148       Functional Assessment    Outcome Measure Options AM-PAC 6 Clicks Basic Mobility (PT)  -MS AM-PAC 6 Clicks Daily Activity (OT)  -LE              User Key  (r) = Recorded By, (t) = Taken By, (c) = Cosigned By      Initials Name Provider Type    Merna Boo, OTR Occupational Therapist    Jose Luis Rodríguez, PT Physical Therapist                                 Physical Therapy Education       Title: PT OT SLP Therapies (In Progress)       Topic: Physical Therapy (Done)       Point: Mobility training (Done)       Learning Progress Summary             Patient Acceptance, E,D, VU,NR by MS at 4/14/2024 1349    Acceptance, E,D, NR by  at 4/12/2024 1421                         Point: Home exercise program (Done)       Learning Progress Summary             Patient Acceptance, E,D, VU,NR by MS at 4/14/2024 1349                         Point: Body mechanics (Done)       Learning Progress Summary             Patient Acceptance, E,D, VU,NR by MS at 4/14/2024 1349                         Point: Precautions (Done)       Learning Progress Summary             Patient Acceptance, E,D, VU,NR by MS at 4/14/2024 1349                                         User Key       Initials Effective Dates Name Provider Type Discipline    EE 06/16/21 -  Brianna Dejesus, PT Physical Therapist PT    MS 06/16/21 -  Jose Luis Latif, PT Physical Therapist PT                  PT Recommendation and Plan     Plan of  Care Reviewed With: patient  Outcome Evaluation: Upon entering room, pt. supine in bed, awake/alert, and agreeable to work with P.T. this date despite c/o fatigue and SOA.  Pt. able to ambulate 12 feet, Min. assist x 2, with HHA x 2 this AM.  Pt. requires CGA x 1 for bed mobility and Min. assist x 2 for sit <-> stand transfers. BLE ther. ex. program x 10 reps completed for general strengthening.  Limited in upright mobility due to fatigue, SOA, and bathroom needs (BM).  Will continue to progress functional mobility as tolerated.     Time Calculation:         PT Charges       Row Name 04/14/24 1353             Time Calculation    Start Time 1015  -MS      Stop Time 1030  -MS      Time Calculation (min) 15 min  -MS      PT Received On 04/14/24  -MS      PT - Next Appointment 04/15/24  -MS         Time Calculation- PT    Total Timed Code Minutes- PT 14 minute(s)  -MS                User Key  (r) = Recorded By, (t) = Taken By, (c) = Cosigned By      Initials Name Provider Type    Jose Luis Rodríguez, PT Physical Therapist                  Therapy Charges for Today       Code Description Service Date Service Provider Modifiers Qty    [id number]  PT THERAPEUTIC ACT EA 15 MIN 4/14/2024 Jose Luis Latif, PT GP 1    89846230614 HC PT THER SUPP EA 15 MIN 4/14/2024 Jose Luis Latif, PT GP 1            PT G-Codes  Outcome Measure Options: AM-PAC 6 Clicks Basic Mobility (PT)  AM-PAC 6 Clicks Score (PT): 14  AM-PAC 6 Clicks Score (OT): 16       Jose Luis Latif PT  4/14/2024

## 2024-04-14 NOTE — PROGRESS NOTES
"CC: Congestive heart failure    Interval History: She reports worsened breathing      Vital Signs  Temp:  [97.5 °F (36.4 °C)-97.9 °F (36.6 °C)] 97.9 °F (36.6 °C)  Heart Rate:  [] 75  Resp:  [18-20] 18  BP: (114-161)/(46-69) 114/57    Intake/Output Summary (Last 24 hours) at 4/14/2024 1050  Last data filed at 4/14/2024 0350  Gross per 24 hour   Intake --   Output 2000 ml   Net -2000 ml     Flowsheet Rows      Flowsheet Row First Filed Value   Admission Height 170.2 cm (67\") Documented at 04/11/2024 1752   Admission Weight 59 kg (130 lb) Documented at 04/11/2024 1752            PHYSICAL EXAM:  General: No acute distress  Resp: Significantly reduced air entry bilateral  CV: Irregularly irregular, NL PMI, NL S1 and S2, no Murmur, no gallop, no rub, No JVD.   ABD:Nl sounds, no masses or tenderness, nondistended, no guarding or rebound  Neuro: alert,cooperative and oriented  Extr:Normal pedal pulses, No edema or cyanosis, moves all extremities      Results Review:    Results from last 7 days   Lab Units 04/14/24  0545   SODIUM mmol/L 142   POTASSIUM mmol/L 3.5   CHLORIDE mmol/L 95*   CO2 mmol/L 34.3*   BUN mg/dL 8   CREATININE mg/dL 0.72   GLUCOSE mg/dL 114*   CALCIUM mg/dL 9.0         Results from last 7 days   Lab Units 04/13/24  0550   WBC 10*3/mm3 11.01*   HEMOGLOBIN g/dL 8.7*   HEMATOCRIT % 28.6*   PLATELETS 10*3/mm3 248             Results from last 7 days   Lab Units 04/13/24  2047   MAGNESIUM mg/dL 2.0         I reviewed the patient's new clinical results.  I personally viewed and interpreted the patient's EKG/Telemetry data        Medication Review:   Meds reviewed         Assessment/Plan         Acute on chronic hypoxic respiratory failure - from CHF and COPD/aspiration pneumonia -on antibiotics  Acute on chronic diastolic heart failure precipitated by atrial fibrillation  Paroxysmal atrial fibrillation-she had right and left cryo maze with closure of left atrial appendage in 2014 when  mitral valve " replaced.  She had A-fib following surgery and temporarily on Coumadin that was discontinued because of GI bleed.  Because of recurrent A-fib she was recently started on Eliquis with worsened anemia and currently on hold  History of bioprosthetic mitral valve/repair tricuspid valve.  Recent enterococcus bacteremia on antibiotics per ID.  CHIO did not show new vegetation  Prior history of repaired tricuspid valve with moderate to severe tricuspid regurgitation and pulmonary potential  Moderate pulmonary tension  Essential hypertension-BP meds held to give more room for diltiazem.  Blood loss anemia: Eliquis on hold,  received PRBC     Heart rate significantly improved with diltiazem  Her O2 sat dropped and she became very dyspneic with drop in o2 sats when she was moved to the commode.  Oxygen increased to 6 L/min.  She looks very dyspneic during exam.  She is going to get breathing treatment  Responding very well to IV diuresis.  Discussed with Dr Muriel Saab MD  04/14/24  10:50 EDT

## 2024-04-14 NOTE — CONSULTS
[facility]  Initial Inpatient Consult Note    Referring Provider: Jose Luis Ramirez    Reason for Consultation: Melena, AC    Subjective     History of present illness:    68 y.o. female hx of COPD, afib, fluid overload, who presented with SOA, on 4L of O2 at home. She reports her SOA is worse than baseline. Pulmonary following. GI consulted for concern for melena, setting of being on AC. She reports previous EGD/Colon but has been over 5 years ago. Unsure if last BM really had overt bleeding. She is here with her . Reports prior polyps removed on colonoscopy.      Past Medical History:  Past Medical History:   Diagnosis Date    VAN (acute kidney injury)     Anemia     Asthma     Atrial flutter     cardioversion    Cataract     Celiac artery stenosis     Chronic respiratory failure with hypoxia     Colon polyp     COPD (chronic obstructive pulmonary disease)     GI bleed     Hiatal hernia     History of CHF (congestive heart failure)     due to MR    History of home oxygen therapy     3 lpm NC    History of mitral valve replacement with tissue graft     Hyperlipidemia     Hypertension     Infectious viral hepatitis     B    Intertrigo     Long term (current) use of anticoagulants     Mitral regurgitation     s/p tissue MVR    PAF (paroxysmal atrial fibrillation)     s/p MAZE    Pneumonia     Pulmonary hypertension     S/P TVR (tricuspid valve repair) 7/7/2016     Past Surgical History:  Past Surgical History:   Procedure Laterality Date    CARDIAC CATHETERIZATION  09/01/2014    Right dominant systemt, normal coronary arteries.     CARDIAC CATHETERIZATION Left 6/10/2016    Procedure: Cardiac catheterization;  Surgeon: Sergei Hall MD;  Location:  [facility];  Service:     CARDIAC CATHETERIZATION N/A 6/10/2016    Procedure: Right Heart Cath;  Surgeon: Sergei Hall MD;  Location:  [facility];  Service:     CATARACT EXTRACTION      COLONOSCOPY       COLONOSCOPY N/A 2017    Procedure: COLONOSCOPY TO CECUM/TI WITH POLYPECTOMY ( COLD BX);  Surgeon: Cleveland Devine MD;  Location: [facility] ENDOSCOPY;  Service:     COLONOSCOPY N/A 8/10/2017    Procedure: COLONOSCOPY to cecum and TI with 2 clips placed at transverse;  Surgeon: Earnest PALOMO MD;  Location: [facility] ENDOSCOPY;  Service:     COLONOSCOPY N/A 2017    Procedure: COLONOSCOPY INTO CECUM WITH COLD POLYPECTOMIES;  Surgeon: Cleveland Devine MD;  Location: [facility] ENDOSCOPY;  Service:     COLONOSCOPY N/A 2021    Procedure: COLONOSCOPY to cecum;  Surgeon: Cleveland Devine MD;  Location: [facility] ENDOSCOPY;  Service: Gastroenterology;  Laterality: N/A;  Pre: Fe deficency anemia, h/x of polyps  Post: fair prep, normal    ENDOSCOPY N/A 2017    Procedure: ESOPHAGOGASTRODUODENOSCOPY;  Surgeon: Porsha Ruby MD;  Location: [facility] ENDOSCOPY;  Service:     ENDOSCOPY N/A 2017    Procedure: ESOPHAGOGASTRODUODENOSCOPY WITH BIOPSIES;  Surgeon: Cleveland Devine MD;  Location: [facility] ENDOSCOPY;  Service:     ENDOSCOPY N/A 2021    Procedure: ESOPHAGOGASTRODUODENOSCOPY  with biopsies;  Surgeon: Cleveland Devine MD;  Location: [facility] ENDOSCOPY;  Service: Gastroenterology;  Laterality: N/A;  Pre: Fe deficency anemia, nausea, heme positive stool   Post: gastritis, sloughing of distal esophagus mucosa    GALLBLADDER SURGERY      HEMORRHOIDECTOMY      HYSTERECTOMY      KIDNEY SURGERY  2013    Stent placement    MAZE PROCEDURE      MITRAL VALVE REPLACEMENT      TONSILLECTOMY      TRICUSPID VALVE REPLACEMENT        Social History:   Social History     Tobacco Use    Smoking status: Former     Current packs/day: 0.00     Average packs/day: 3.0 packs/day for 54.0 years (162.0 ttl pk-yrs)     Types: Cigarettes     Start date:      Quit date:      Years since quittin.2     Passive exposure: Past    Smokeless tobacco: Never    Tobacco comments:     caffeine - tea or mt dew    Substance Use Topics     Alcohol use: No      Family History:  Family History   Adopted: Yes   Problem Relation Age of Onset    No Known Problems Mother     No Known Problems Father        Home Meds:  Medications Prior to Admission   Medication Sig Dispense Refill Last Dose    albuterol sulfate  (90 Base) MCG/ACT inhaler Inhale 2 puffs Every 4 (Four) Hours As Needed for Wheezing. 18 g 3     amLODIPine (NORVASC) 10 MG tablet TAKE 1 TABLET BY MOUTH EVERY DAY 90 tablet 1     ampicillin 2 g in sodium chloride 0.9 % 100 mL IVPB Infuse 2 g into a venous catheter Every 4 (Four) Hours for 225 doses. Indications: Bacteria in the Blood, Endocarditis       apixaban (ELIQUIS) 5 MG tablet tablet Take 1 tablet by mouth Every 12 (Twelve) Hours. Indications: Atrial Fibrillation 180 tablet 3     atorvastatin (LIPITOR) 40 MG tablet TAKE 1 TABLET BY MOUTH EVERY DAY 90 tablet 2     budesonide (PULMICORT) 0.5 MG/2ML nebulizer solution Take 2 mL by nebulization 2 (Two) Times a Day for 30 days. Indications: Chronic Obstructive Lung Disease, Chronic hypoxic respiratory failure 360 mL 3     carvedilol (COREG) 6.25 MG tablet TAKE 1 TABLET BY MOUTH TWICE A DAY WITH MEALS 180 tablet 2     cefTRIAXone 2,000 mg in sodium chloride 0.9 % 100 mL IVPB Infuse 2,000 mg into a venous catheter Every 12 (Twelve) Hours for 76 doses. Indications: Bacteria in the Blood, Endocarditis       cyclobenzaprine (FLEXERIL) 10 MG tablet TAKE 1 TABLET BY MOUTH EVERYDAY AT BEDTIME 90 tablet 3     docosanol (ABREVA) 10 % cream cream Apply 1 Application topically to the appropriate area as directed 5 (Five) Times a Day. 2 g 0     furosemide (LASIX) 40 MG tablet TAKE 1 TABLET BY MOUTH TWICE A  tablet 1     HYDROcodone-acetaminophen (NORCO) 7.5-325 MG per tablet Take 1 tablet by mouth Every 6 (Six) Hours As Needed for Moderate Pain (Chronic pain medicine for low back pain). 90 tablet 0     ipratropium-albuterol (DUO-NEB) 0.5-2.5 mg/3 ml nebulizer Take 3 mL by nebulization Every 4  (Four) Hours As Needed for Wheezing. 360 mL 3     lisinopril (PRINIVIL,ZESTRIL) 40 MG tablet Take 1 tablet by mouth Daily.       Multiple Vitamins-Minerals (MULTIVITAL) tablet Take 1 tablet by mouth Daily.       Nebulizer device 1 Units 4 (Four) Times a Day As Needed (Wheezing). J44.1 j20.8 1 each 0     O2 (OXYGEN) Inhale 2 L/min Continuous.       omeprazole (priLOSEC) 40 MG capsule TAKE 1 CAPSULE BY MOUTH EVERY DAY 90 capsule 1     potassium chloride (K-DUR,KLOR-CON) 20 MEQ CR tablet 1 po tid 60 tablet 0     roflumilast (DALIRESP) 500 MCG tablet tablet Take 1 tablet by mouth Daily. 90 tablet 1     rOPINIRole (REQUIP) 2 MG tablet TAKE 1 TABLET BY MOUTH EVERY DAY AT NIGHT 90 tablet 2     sertraline (ZOLOFT) 100 MG tablet TAKE 1 TABLET BY MOUTH EVERY DAY 90 tablet 1     zolpidem (AMBIEN) 10 MG tablet Take 1 tablet by mouth At Night As Needed for Sleep. 30 tablet 3     loperamide (IMODIUM) 2 MG capsule Take 1 capsule by mouth 4 (Four) Times a Day As Needed for Diarrhea. 20 capsule 0     loratadine (CLARITIN) 10 MG tablet Take 1 tablet by mouth 2 (two) times a day.   Unknown    ondansetron ODT (ZOFRAN-ODT) 4 MG disintegrating tablet Place 1 tablet on the tongue Every 8 (Eight) Hours As Needed for Nausea or Vomiting. 10 tablet 0 Unknown     Current Meds:   ampicillin, 2 g, Intravenous, Q4H  [Held by provider] apixaban, 5 mg, Oral, Q12H  atorvastatin, 40 mg, Oral, Daily  budesonide, 0.5 mg, Nebulization, BID - RT  cefTRIAXone (ROCEPHIN) 2,000 mg in sodium chloride 0.9 % 100 mL MBP, 2,000 mg, Intravenous, Q12H  cetirizine, 10 mg, Oral, Daily  cyclobenzaprine, 10 mg, Oral, Nightly  dilTIAZem, 60 mg, Oral, Q6H  furosemide, 60 mg, Intravenous, Daily  methylPREDNISolone sodium succinate, 40 mg, Intravenous, Q12H  multivitamin with minerals, 1 tablet, Oral, Daily  pantoprazole, 40 mg, Intravenous, Q12H  roflumilast, 500 mcg, Oral, Daily  rOPINIRole, 2 mg, Oral, Nightly  sertraline, 100 mg, Oral, Daily  sodium chloride, 10 mL,  Intravenous, Q12H  sodium chloride, 10 mL, Intravenous, Q12H  spironolactone, 25 mg, Oral, Daily      Allergies:  Allergies   Allergen Reactions    Bupropion Itching    Cephalexin Itching     Tolerated piperacillin/tazobactam, ampicillin, cefdinir, and ceftriaxone    Metoprolol Itching     Review of Systems  Pertinent items are noted in HPI     Objective     Vital Signs  Temp:  [97.5 °F (36.4 °C)-97.9 °F (36.6 °C)] 97.7 °F (36.5 °C)  Heart Rate:  [] 107  Resp:  [18-20] 18  BP: (114-161)/(46-71) 125/71  Physical Exam:  General Appearance:    Alert, cooperative, in no acute distress   Head:    Normocephalic, without obvious abnormality, atraumatic   Eyes:          conjunctivae and sclerae normal, no   icterus   Throat:   no thrush, oral mucosa moist   Neck:   Supple, no adenopathy   Lungs:     Clear to auscultation bilaterally    Heart:    Regular rhythm and normal rate    Chest Wall:    No abnormalities observed   Abdomen:     Soft, nondistended, nontender; normal bowel sounds   Extremities:   no edema, no redness   Skin:   No bruising or rash   Psychiatric:  normal mood and insight     Results Review:   I reviewed the patient's new clinical results.    Results from last 7 days   Lab Units 04/13/24  0550 04/12/24  0534 04/11/24  1800   WBC 10*3/mm3 11.01* 14.36* 15.16*   HEMOGLOBIN g/dL 8.7* 6.9* 7.2*   HEMATOCRIT % 28.6* 22.4* 23.8*   PLATELETS 10*3/mm3 248 384 422     Results from last 7 days   Lab Units 04/14/24  0545 04/13/24  2047 04/13/24  0550 04/12/24  1049 04/12/24  0533   SODIUM mmol/L 142  --  146*  --  144   POTASSIUM mmol/L 3.5 4.3 3.6   < > 3.6   CHLORIDE mmol/L 95*  --  99  --  102   CO2 mmol/L 34.3*  --  36.2*  --  33.4*   BUN mg/dL 8  --  9  --  8   CREATININE mg/dL 0.72  --  0.78  --  0.82   CALCIUM mg/dL 9.0  --  8.6  --  7.9*   BILIRUBIN mg/dL  --   --  0.5  --  0.5   ALK PHOS U/L  --   --  66  --  69   ALT (SGPT) U/L  --   --  15  --  17   AST (SGOT) U/L  --   --  23  --  24   GLUCOSE mg/dL  114*  --  102*  --  126*    < > = values in this interval not displayed.         Lab Results   Lab Value Date/Time    LIPASE 30 06/09/2023 0445    LIPASE 46 08/13/2017 0449       Radiology:  XR Chest 1 View   Final Result   1. FINDINGS consistent with bilateral pulmonary edema and pleural   effusions. These findings may have progressed slightly since the   4/11/2024 study.           This report was finalized on 4/14/2024 2:22 PM by Dr. Donis Guzmán M.D on Workstation: [id number]          CT Chest Without Contrast Diagnostic   Final Result   1.  Small bilateral pleural effusions with bibasilar pulmonary   pacification, right greater than left. Some areas have a somewhat   nodular appearance. Findings are most suggestive of multifocal   pneumonia, possibly aspiration, and correlation with patient history is   recommended to determine most appropriate etiology. Given the nodular   appearance, follow-up with chest CT in 6 to 8 weeks is recommended to   ensure resolution and exclude the possibility of malignancy. New   mediastinal adenopathy enlarged since three 224 can be followed at this   time as well.   2.  Thickening of distal esophagus suggestive of esophagitis in   appropriate context. Correlation with patient history is recommended   with follow-up endoscopy if clinically indicated.   3.  Severe emphysema.   4.  Other findings as above.               This report was finalized on 4/12/2024 5:56 PM by Dr. Papo De La Torre M.D   on Workstation: [id number]          XR Chest 1 View   Final Result   Findings impression:   Background interstitial thickening is present throughout the bilateral   lungs with many areas demonstrate a somewhat nodular appearance which   cannot be evaluated on plain film radiographs, similar that seen on   prior radiographs, and best seen on CT chest 3/2/2024. Please refer to   this dictation for further formation and follow-up recommendations..   There is worsening pulmonary pacification  within the right lung with   probable small bilateral pleural effusions. Cardiac silhouette is mildly   enlarged. Constellation findings are suggestive of worsening pulmonary   edema and/or multifocal pneumonia in the appropriate clinical context.   Correlation with patient history is recommended with follow-up chest CT   if clinically indicated. No pneumothorax is seen. There are median   sternotomy wires. A left PICC tip terminates over the expected location   of the superior vena cava.               This report was finalized on 4/11/2024 7:03 PM by Dr. Papo De La Torre M.D   on Workstation: [id number]              Assessment & Plan   Active Hospital Problems    Diagnosis     **Acute on chronic diastolic congestive heart failure     COPD (chronic obstructive pulmonary disease)     Septicemia due to enterococcus     Pneumonia     Iron deficiency anemia     Chronic hypoxemic respiratory failure     PAF (paroxysmal atrial fibrillation)        Assessment:  Melena   -Appears to be stable,    -hgb uptrending   -Will need EGD/Colon pending respiratory status   -Last EGD/Colon >5 years ago.    -CLD, anticipation of EGD/Colon Tuesday/Wednesday barring any acute change in her respiratory status    2. Acute on chronic respiratory failure   -on 4L NC baseline   -Feels more symptomatic    3. Afib w/ RVR   -dilt   -AC usually, currently being held.       Plan: Optimize respiratory and volume status, plan for EGD/Colon Tuesday? Currently AC is being held. Previous endoscoyp >5 years ago per family and patient, hx of polyps.       I discussed the patients findings and my recommendations with patient.    Elisabet Leal MD

## 2024-04-14 NOTE — H&P (VIEW-ONLY)
[facility]  Initial Inpatient Consult Note    Referring Provider: Jose Luis Ramirez    Reason for Consultation: Melena, AC    Subjective     History of present illness:    68 y.o. female hx of COPD, afib, fluid overload, who presented with SOA, on 4L of O2 at home. She reports her SOA is worse than baseline. Pulmonary following. GI consulted for concern for melena, setting of being on AC. She reports previous EGD/Colon but has been over 5 years ago. Unsure if last BM really had overt bleeding. She is here with her . Reports prior polyps removed on colonoscopy.      Past Medical History:  Past Medical History:   Diagnosis Date    VAN (acute kidney injury)     Anemia     Asthma     Atrial flutter     cardioversion    Cataract     Celiac artery stenosis     Chronic respiratory failure with hypoxia     Colon polyp     COPD (chronic obstructive pulmonary disease)     GI bleed     Hiatal hernia     History of CHF (congestive heart failure)     due to MR    History of home oxygen therapy     3 lpm NC    History of mitral valve replacement with tissue graft     Hyperlipidemia     Hypertension     Infectious viral hepatitis     B    Intertrigo     Long term (current) use of anticoagulants     Mitral regurgitation     s/p tissue MVR    PAF (paroxysmal atrial fibrillation)     s/p MAZE    Pneumonia     Pulmonary hypertension     S/P TVR (tricuspid valve repair) 7/7/2016     Past Surgical History:  Past Surgical History:   Procedure Laterality Date    CARDIAC CATHETERIZATION  09/01/2014    Right dominant systemt, normal coronary arteries.     CARDIAC CATHETERIZATION Left 6/10/2016    Procedure: Cardiac catheterization;  Surgeon: Sergei Hall MD;  Location:  [facility];  Service:     CARDIAC CATHETERIZATION N/A 6/10/2016    Procedure: Right Heart Cath;  Surgeon: Sergei Hall MD;  Location:  [facility];  Service:     CATARACT EXTRACTION      COLONOSCOPY       COLONOSCOPY N/A 2017    Procedure: COLONOSCOPY TO CECUM/TI WITH POLYPECTOMY ( COLD BX);  Surgeon: Cleveland Devine MD;  Location: [facility] ENDOSCOPY;  Service:     COLONOSCOPY N/A 8/10/2017    Procedure: COLONOSCOPY to cecum and TI with 2 clips placed at transverse;  Surgeon: Earnest PALOMO MD;  Location: [facility] ENDOSCOPY;  Service:     COLONOSCOPY N/A 2017    Procedure: COLONOSCOPY INTO CECUM WITH COLD POLYPECTOMIES;  Surgeon: Cleveland Devine MD;  Location: [facility] ENDOSCOPY;  Service:     COLONOSCOPY N/A 2021    Procedure: COLONOSCOPY to cecum;  Surgeon: Cleveland Devine MD;  Location: [facility] ENDOSCOPY;  Service: Gastroenterology;  Laterality: N/A;  Pre: Fe deficency anemia, h/x of polyps  Post: fair prep, normal    ENDOSCOPY N/A 2017    Procedure: ESOPHAGOGASTRODUODENOSCOPY;  Surgeon: Porsha Ruby MD;  Location: [facility] ENDOSCOPY;  Service:     ENDOSCOPY N/A 2017    Procedure: ESOPHAGOGASTRODUODENOSCOPY WITH BIOPSIES;  Surgeon: Cleveland Devine MD;  Location: [facility] ENDOSCOPY;  Service:     ENDOSCOPY N/A 2021    Procedure: ESOPHAGOGASTRODUODENOSCOPY  with biopsies;  Surgeon: Cleveland Devine MD;  Location: [facility] ENDOSCOPY;  Service: Gastroenterology;  Laterality: N/A;  Pre: Fe deficency anemia, nausea, heme positive stool   Post: gastritis, sloughing of distal esophagus mucosa    GALLBLADDER SURGERY      HEMORRHOIDECTOMY      HYSTERECTOMY      KIDNEY SURGERY  2013    Stent placement    MAZE PROCEDURE      MITRAL VALVE REPLACEMENT      TONSILLECTOMY      TRICUSPID VALVE REPLACEMENT        Social History:   Social History     Tobacco Use    Smoking status: Former     Current packs/day: 0.00     Average packs/day: 3.0 packs/day for 54.0 years (162.0 ttl pk-yrs)     Types: Cigarettes     Start date:      Quit date:      Years since quittin.2     Passive exposure: Past    Smokeless tobacco: Never    Tobacco comments:     caffeine - tea or mt dew    Substance Use Topics     Alcohol use: No      Family History:  Family History   Adopted: Yes   Problem Relation Age of Onset    No Known Problems Mother     No Known Problems Father        Home Meds:  Medications Prior to Admission   Medication Sig Dispense Refill Last Dose    albuterol sulfate  (90 Base) MCG/ACT inhaler Inhale 2 puffs Every 4 (Four) Hours As Needed for Wheezing. 18 g 3     amLODIPine (NORVASC) 10 MG tablet TAKE 1 TABLET BY MOUTH EVERY DAY 90 tablet 1     ampicillin 2 g in sodium chloride 0.9 % 100 mL IVPB Infuse 2 g into a venous catheter Every 4 (Four) Hours for 225 doses. Indications: Bacteria in the Blood, Endocarditis       apixaban (ELIQUIS) 5 MG tablet tablet Take 1 tablet by mouth Every 12 (Twelve) Hours. Indications: Atrial Fibrillation 180 tablet 3     atorvastatin (LIPITOR) 40 MG tablet TAKE 1 TABLET BY MOUTH EVERY DAY 90 tablet 2     budesonide (PULMICORT) 0.5 MG/2ML nebulizer solution Take 2 mL by nebulization 2 (Two) Times a Day for 30 days. Indications: Chronic Obstructive Lung Disease, Chronic hypoxic respiratory failure 360 mL 3     carvedilol (COREG) 6.25 MG tablet TAKE 1 TABLET BY MOUTH TWICE A DAY WITH MEALS 180 tablet 2     cefTRIAXone 2,000 mg in sodium chloride 0.9 % 100 mL IVPB Infuse 2,000 mg into a venous catheter Every 12 (Twelve) Hours for 76 doses. Indications: Bacteria in the Blood, Endocarditis       cyclobenzaprine (FLEXERIL) 10 MG tablet TAKE 1 TABLET BY MOUTH EVERYDAY AT BEDTIME 90 tablet 3     docosanol (ABREVA) 10 % cream cream Apply 1 Application topically to the appropriate area as directed 5 (Five) Times a Day. 2 g 0     furosemide (LASIX) 40 MG tablet TAKE 1 TABLET BY MOUTH TWICE A  tablet 1     HYDROcodone-acetaminophen (NORCO) 7.5-325 MG per tablet Take 1 tablet by mouth Every 6 (Six) Hours As Needed for Moderate Pain (Chronic pain medicine for low back pain). 90 tablet 0     ipratropium-albuterol (DUO-NEB) 0.5-2.5 mg/3 ml nebulizer Take 3 mL by nebulization Every 4  (Four) Hours As Needed for Wheezing. 360 mL 3     lisinopril (PRINIVIL,ZESTRIL) 40 MG tablet Take 1 tablet by mouth Daily.       Multiple Vitamins-Minerals (MULTIVITAL) tablet Take 1 tablet by mouth Daily.       Nebulizer device 1 Units 4 (Four) Times a Day As Needed (Wheezing). J44.1 j20.8 1 each 0     O2 (OXYGEN) Inhale 2 L/min Continuous.       omeprazole (priLOSEC) 40 MG capsule TAKE 1 CAPSULE BY MOUTH EVERY DAY 90 capsule 1     potassium chloride (K-DUR,KLOR-CON) 20 MEQ CR tablet 1 po tid 60 tablet 0     roflumilast (DALIRESP) 500 MCG tablet tablet Take 1 tablet by mouth Daily. 90 tablet 1     rOPINIRole (REQUIP) 2 MG tablet TAKE 1 TABLET BY MOUTH EVERY DAY AT NIGHT 90 tablet 2     sertraline (ZOLOFT) 100 MG tablet TAKE 1 TABLET BY MOUTH EVERY DAY 90 tablet 1     zolpidem (AMBIEN) 10 MG tablet Take 1 tablet by mouth At Night As Needed for Sleep. 30 tablet 3     loperamide (IMODIUM) 2 MG capsule Take 1 capsule by mouth 4 (Four) Times a Day As Needed for Diarrhea. 20 capsule 0     loratadine (CLARITIN) 10 MG tablet Take 1 tablet by mouth 2 (two) times a day.   Unknown    ondansetron ODT (ZOFRAN-ODT) 4 MG disintegrating tablet Place 1 tablet on the tongue Every 8 (Eight) Hours As Needed for Nausea or Vomiting. 10 tablet 0 Unknown     Current Meds:   ampicillin, 2 g, Intravenous, Q4H  [Held by provider] apixaban, 5 mg, Oral, Q12H  atorvastatin, 40 mg, Oral, Daily  budesonide, 0.5 mg, Nebulization, BID - RT  cefTRIAXone (ROCEPHIN) 2,000 mg in sodium chloride 0.9 % 100 mL MBP, 2,000 mg, Intravenous, Q12H  cetirizine, 10 mg, Oral, Daily  cyclobenzaprine, 10 mg, Oral, Nightly  dilTIAZem, 60 mg, Oral, Q6H  furosemide, 60 mg, Intravenous, Daily  methylPREDNISolone sodium succinate, 40 mg, Intravenous, Q12H  multivitamin with minerals, 1 tablet, Oral, Daily  pantoprazole, 40 mg, Intravenous, Q12H  roflumilast, 500 mcg, Oral, Daily  rOPINIRole, 2 mg, Oral, Nightly  sertraline, 100 mg, Oral, Daily  sodium chloride, 10 mL,  Intravenous, Q12H  sodium chloride, 10 mL, Intravenous, Q12H  spironolactone, 25 mg, Oral, Daily      Allergies:  Allergies   Allergen Reactions    Bupropion Itching    Cephalexin Itching     Tolerated piperacillin/tazobactam, ampicillin, cefdinir, and ceftriaxone    Metoprolol Itching     Review of Systems  Pertinent items are noted in HPI     Objective     Vital Signs  Temp:  [97.5 °F (36.4 °C)-97.9 °F (36.6 °C)] 97.7 °F (36.5 °C)  Heart Rate:  [] 107  Resp:  [18-20] 18  BP: (114-161)/(46-71) 125/71  Physical Exam:  General Appearance:    Alert, cooperative, in no acute distress   Head:    Normocephalic, without obvious abnormality, atraumatic   Eyes:          conjunctivae and sclerae normal, no   icterus   Throat:   no thrush, oral mucosa moist   Neck:   Supple, no adenopathy   Lungs:     Clear to auscultation bilaterally    Heart:    Regular rhythm and normal rate    Chest Wall:    No abnormalities observed   Abdomen:     Soft, nondistended, nontender; normal bowel sounds   Extremities:   no edema, no redness   Skin:   No bruising or rash   Psychiatric:  normal mood and insight     Results Review:   I reviewed the patient's new clinical results.    Results from last 7 days   Lab Units 04/13/24  0550 04/12/24  0534 04/11/24  1800   WBC 10*3/mm3 11.01* 14.36* 15.16*   HEMOGLOBIN g/dL 8.7* 6.9* 7.2*   HEMATOCRIT % 28.6* 22.4* 23.8*   PLATELETS 10*3/mm3 248 384 422     Results from last 7 days   Lab Units 04/14/24  0545 04/13/24  2047 04/13/24  0550 04/12/24  1049 04/12/24  0533   SODIUM mmol/L 142  --  146*  --  144   POTASSIUM mmol/L 3.5 4.3 3.6   < > 3.6   CHLORIDE mmol/L 95*  --  99  --  102   CO2 mmol/L 34.3*  --  36.2*  --  33.4*   BUN mg/dL 8  --  9  --  8   CREATININE mg/dL 0.72  --  0.78  --  0.82   CALCIUM mg/dL 9.0  --  8.6  --  7.9*   BILIRUBIN mg/dL  --   --  0.5  --  0.5   ALK PHOS U/L  --   --  66  --  69   ALT (SGPT) U/L  --   --  15  --  17   AST (SGOT) U/L  --   --  23  --  24   GLUCOSE mg/dL  114*  --  102*  --  126*    < > = values in this interval not displayed.         Lab Results   Lab Value Date/Time    LIPASE 30 06/09/2023 0445    LIPASE 46 08/13/2017 0449       Radiology:  XR Chest 1 View   Final Result   1. FINDINGS consistent with bilateral pulmonary edema and pleural   effusions. These findings may have progressed slightly since the   4/11/2024 study.           This report was finalized on 4/14/2024 2:22 PM by Dr. Donis Guzmán M.D on Workstation: [id number]          CT Chest Without Contrast Diagnostic   Final Result   1.  Small bilateral pleural effusions with bibasilar pulmonary   pacification, right greater than left. Some areas have a somewhat   nodular appearance. Findings are most suggestive of multifocal   pneumonia, possibly aspiration, and correlation with patient history is   recommended to determine most appropriate etiology. Given the nodular   appearance, follow-up with chest CT in 6 to 8 weeks is recommended to   ensure resolution and exclude the possibility of malignancy. New   mediastinal adenopathy enlarged since three 224 can be followed at this   time as well.   2.  Thickening of distal esophagus suggestive of esophagitis in   appropriate context. Correlation with patient history is recommended   with follow-up endoscopy if clinically indicated.   3.  Severe emphysema.   4.  Other findings as above.               This report was finalized on 4/12/2024 5:56 PM by Dr. Papo De La Torre M.D   on Workstation: [id number]          XR Chest 1 View   Final Result   Findings impression:   Background interstitial thickening is present throughout the bilateral   lungs with many areas demonstrate a somewhat nodular appearance which   cannot be evaluated on plain film radiographs, similar that seen on   prior radiographs, and best seen on CT chest 3/2/2024. Please refer to   this dictation for further formation and follow-up recommendations..   There is worsening pulmonary pacification  within the right lung with   probable small bilateral pleural effusions. Cardiac silhouette is mildly   enlarged. Constellation findings are suggestive of worsening pulmonary   edema and/or multifocal pneumonia in the appropriate clinical context.   Correlation with patient history is recommended with follow-up chest CT   if clinically indicated. No pneumothorax is seen. There are median   sternotomy wires. A left PICC tip terminates over the expected location   of the superior vena cava.               This report was finalized on 4/11/2024 7:03 PM by Dr. Papo De La Torre M.D   on Workstation: [id number]              Assessment & Plan   Active Hospital Problems    Diagnosis     **Acute on chronic diastolic congestive heart failure     COPD (chronic obstructive pulmonary disease)     Septicemia due to enterococcus     Pneumonia     Iron deficiency anemia     Chronic hypoxemic respiratory failure     PAF (paroxysmal atrial fibrillation)        Assessment:  Melena   -Appears to be stable,    -hgb uptrending   -Will need EGD/Colon pending respiratory status   -Last EGD/Colon >5 years ago.    -CLD, anticipation of EGD/Colon Tuesday/Wednesday barring any acute change in her respiratory status    2. Acute on chronic respiratory failure   -on 4L NC baseline   -Feels more symptomatic    3. Afib w/ RVR   -dilt   -AC usually, currently being held.       Plan: Optimize respiratory and volume status, plan for EGD/Colon Tuesday? Currently AC is being held. Previous endoscoyp >5 years ago per family and patient, hx of polyps.       I discussed the patients findings and my recommendations with patient.    Elisabet Leal MD

## 2024-04-14 NOTE — THERAPY EVALUATION
Patient Name: Paz Browne  : 1955    MRN: [id number]                              Today's Date: 2024       Admit Date: 2024    Visit Dx:     ICD-10-CM ICD-9-CM   1. Sepsis, due to unspecified organism, unspecified whether acute organ dysfunction present  A41.9 038.9     995.91   2. Acute on chronic congestive heart failure, unspecified heart failure type  I50.9 428.0   3. Atrial fibrillation with RVR  I48.91 427.31   4. History of atrial fibrillation  Z86.79 V12.59   5. Chronic anticoagulation  Z79.01 V58.61   6. Hypoxia  R09.02 799.02   7. History of COPD  Z87.09 V12.69   8. History of endocarditis  Z86.79 V12.59     Patient Active Problem List   Diagnosis    PAF (paroxysmal atrial fibrillation)    Hypertension    Hyperlipidemia    Pain medication agreement    Hiatal hernia    Primary osteoarthritis involving multiple joints    Pulmonary hypertension    S/P TVR (tricuspid valve repair)    Pulmonary nodule    Restless leg syndrome    Chronic low back pain    Dysplastic polyp of colon    History of mitral valve replacement with tissue graft    Chronic hypoxemic respiratory failure    Anemia    Celiac artery stenosis    Intertrigo    Abnormal EKG    Muscle spasms of both lower extremities    Iron deficiency anemia    Adenomatous polyp of colon    Gastroesophageal reflux disease without esophagitis    Headache    History of endocarditis    Pneumonia of both lower lobes due to infectious organism    Peptic ulcer disease    Peripheral vascular disease    Hyperlipidemia    Hyperglycemia    COPD with acute exacerbation    Chronic diastolic CHF (congestive heart failure)    Chronic bilateral low back pain    COPD exacerbation    Leukocytosis    Elevated troponin    Pneumonia    Acute-on-chronic respiratory failure    Septicemia due to enterococcus    Acute on chronic diastolic congestive heart failure    Sepsis    COPD (chronic obstructive pulmonary disease)     Past Medical History:   Diagnosis Date     VAN (acute kidney injury)     Anemia     Asthma     Atrial flutter     cardioversion    Cataract     Celiac artery stenosis     Chronic respiratory failure with hypoxia     Colon polyp     COPD (chronic obstructive pulmonary disease)     GI bleed     Hiatal hernia     History of CHF (congestive heart failure)     due to MR    History of home oxygen therapy     3 lpm NC    History of mitral valve replacement with tissue graft     Hyperlipidemia     Hypertension     Infectious viral hepatitis     B    Intertrigo     Long term (current) use of anticoagulants     Mitral regurgitation     s/p tissue MVR    PAF (paroxysmal atrial fibrillation)     s/p MAZE    Pneumonia     Pulmonary hypertension     S/P TVR (tricuspid valve repair) 7/7/2016     Past Surgical History:   Procedure Laterality Date    CARDIAC CATHETERIZATION  09/01/2014    Right dominant systemt, normal coronary arteries.     CARDIAC CATHETERIZATION Left 6/10/2016    Procedure: Cardiac catheterization;  Surgeon: Sergei Hall MD;  Location: [facility] CATH INVASIVE LOCATION;  Service:     CARDIAC CATHETERIZATION N/A 6/10/2016    Procedure: Right Heart Cath;  Surgeon: Sergei Hall MD;  [facility];  Service:     CATARACT EXTRACTION      COLONOSCOPY      COLONOSCOPY N/A 8/4/2017    Procedure: COLONOSCOPY TO CECUM/TI WITH POLYPECTOMY ( COLD BX);  Surgeon: Cleveland Devine MD;  Location: [facility] ENDOSCOPY;  Service:     COLONOSCOPY N/A 8/10/2017    Procedure: COLONOSCOPY to cecum and TI with 2 clips placed at transverse;  Surgeon: Earnest PALOMO MD;  Location: [facility] ENDOSCOPY;  Service:     COLONOSCOPY N/A 12/22/2017    Procedure: COLONOSCOPY INTO CECUM WITH COLD POLYPECTOMIES;  Surgeon: Cleveland Devine MD;  Location: [facility] ENDOSCOPY;  Service:     COLONOSCOPY N/A 5/17/2021    Procedure: COLONOSCOPY to cecum;  Surgeon: Cleveland Devine MD;  Location: [facility] ENDOSCOPY;  Service: Gastroenterology;  Laterality: N/A;  Pre: Fe deficency anemia, h/x  of polyps  Post: fair prep, normal    ENDOSCOPY N/A 8/17/2017    Procedure: ESOPHAGOGASTRODUODENOSCOPY;  Surgeon: Porsha Ruby MD;  Location: [facility] ENDOSCOPY;  Service:     ENDOSCOPY N/A 12/22/2017    Procedure: ESOPHAGOGASTRODUODENOSCOPY WITH BIOPSIES;  Surgeon: Cleveland Devine MD;  Location: [facility] ENDOSCOPY;  Service:     ENDOSCOPY N/A 5/17/2021    Procedure: ESOPHAGOGASTRODUODENOSCOPY  with biopsies;  Surgeon: Cleveland Devine MD;  Location: [facility] ENDOSCOPY;  Service: Gastroenterology;  Laterality: N/A;  Pre: Fe deficency anemia, nausea, heme positive stool   Post: gastritis, sloughing of distal esophagus mucosa    GALLBLADDER SURGERY      HEMORRHOIDECTOMY      HYSTERECTOMY      KIDNEY SURGERY  04/22/2013    Stent placement    MAZE PROCEDURE      MITRAL VALVE REPLACEMENT      TONSILLECTOMY      TRICUSPID VALVE REPLACEMENT        General Information       Row Name 04/14/24 1139          OT Time and Intention    Document Type evaluation;therapy note (daily note)  -LE     Mode of Treatment individual therapy;occupational therapy  -       Row Name 04/14/24 1139          General Information    Patient Profile Reviewed yes  -LE     Prior Level of Function independent:;transfer;ADL's  -LE     Existing Precautions/Restrictions fall;oxygen therapy device and L/min  -LE     Barriers to Rehab medically complex  -       Row Name 04/14/24 1139          Living Environment    People in Home spouse  -       Row Name 04/14/24 1139          Cognition    Orientation Status (Cognition) oriented x 4  -       Row Name 04/14/24 1139          Safety Issues, Functional Mobility    Impairments Affecting Function (Mobility) endurance/activity tolerance;shortness of breath  -LE     Comment, Safety Issues/Impairments (Mobility) non skid socks and gait belt worn.  -LE               User Key  (r) = Recorded By, (t) = Taken By, (c) = Cosigned By      Initials Name Provider Type    Merna Boo, OTR Occupational Therapist                      Mobility/ADL's       Row Name 04/14/24 1141          Bed Mobility    Bed Mobility supine-sit;sit-supine;scooting/bridging  -LE     Supine-Sit [place] (Bed Mobility) standby assist  -LE     Sit-Supine [place] (Bed Mobility) standby assist  -LE       Row Name 04/14/24 1141          Transfers    Transfers sit-stand transfer;stand-sit transfer;bed-chair transfer;toilet transfer  -LE       Row Name 04/14/24 1141          Bed-Chair Transfer    Bed-Chair [place] (Transfers) standby assist  -LE     Assistive Device (Bed-Chair Transfers) walker, front-wheeled  -LE       Row Name 04/14/24 1141          Sit-Stand Transfer    Sit-Stand [place] (Transfers) standby assist  -LE     Assistive Device (Sit-Stand Transfers) walker, front-wheeled  -LE       Row Name 04/14/24 1141          Stand-Sit Transfer    Stand-Sit [place] (Transfers) standby assist  -LE     Assistive Device (Stand-Sit Transfers) walker, front-wheeled  -LE       Row Name 04/14/24 1141          Functional Mobility    Functional Mobility- Ind. Level standby assist  -LE     Functional Mobility- Device walker, front-wheeled  -DANDRE       Row Name 04/14/24 1141          Activities of Daily Living    BADL Assessment/Intervention toileting;feeding;grooming;lower body dressing;upper body dressing;bathing  -LE       Row Name 04/14/24 1141          Lower Body Dressing Assessment/Training    Comment, (Lower Body Dressing) defer attempt due to SOA after walk to chair.  -       Row Name 04/14/24 1141          Self-Feeding Assessment/Training    Comment, (Feeding) denies difficulty.  -       Row Name 04/14/24 1141          Toileting Assessment/Training    [place] Level (Toileting) dependent (less than 25% patient effort)  -DANDRE     Comment, (Toileting) pt with purwick.  OT recommends pt does not need purwick and does  not reconnect to suction.  RN/pt/spouse aware. Recommend using BSC with nursing due to frequency and SOA.  -DANDRE                User Key  (r) = Recorded By, (t) = Taken By, (c) = Cosigned By      Initials Name Provider Type    Merna Boo OTR Occupational Therapist                   Obj/Interventions       Row Name 04/14/24 1144          Range of Motion Comprehensive    General Range of Motion bilateral upper extremity ROM WFL  -LE       Row Name 04/14/24 1144          Strength Comprehensive (MMT)    Comment, General Manual Muscle Testing (MMT) Assessment B UE 4/5.  -LE       Row Name 04/14/24 1144          Balance    Balance Assessment sitting static balance;standing static balance;standing dynamic balance  -LE     Static Sitting Balance independent  -LE     Static Standing Balance standby assist  -LE     Position/Device Used, Standing Balance walker, front-wheeled  -LE     Comment, Balance no overt LOB.  pt moves quickly and OT recommends slow pace overall and pacing to decrease spikes in SOA  -LE               User Key  (r) = Recorded By, (t) = Taken By, (c) = Cosigned By      Initials Name Provider Type    Merna Boo OTR Occupational Therapist                   Goals/Plan       [facility] Name 04/14/24 1148          Transfer Goal 1 (OT)    Activity/Assistive Device (Transfer Goal 1, OT) sit-to-stand/stand-to-sit;bed-to-chair/chair-to-bed;toilet;commode, 3-in-1;walker, rolling  -LE     [place] Level/Cues Needed (Transfer Goal 1, OT) modified independence  -LE     Time Frame (Transfer Goal 1, OT) 2 weeks  -LE     Progress/Outcome (Transfer Goal 1, OT) goal ongoing  -LE       Row Name 04/14/24 1148          Dressing Goal 1 (OT)    Activity/Device (Dressing Goal 1, OT) upper body dressing;lower body dressing;reacher;long-handled shoe horn;sock-aid  -LE     [place]/Cues Needed (Dressing Goal 1, OT) standby assist;set-up required  -LE     Time Frame (Dressing Goal 1, OT) 2 weeks  -LE     Progress/Outcome (Dressing Goal 1, OT) goal ongoing  -LE       Row Name 04/14/24 1148          Toileting Goal 1 (OT)    Activity/Device  (Toileting Goal 1, OT) toileting skills, all;grab bar/safety frame;commode, 3-in-1  -LE     [place] Level/Cues Needed (Toileting Goal 1, OT) set-up required;standby assist;modified independence  -LE     Time Frame (Toileting Goal 1, OT) 2 weeks  -LE     Progress/Outcome (Toileting Goal 1, OT) goal ongoing  -LE       Row Name 04/14/24 1148          Problem Specific Goal 1 (OT)    Problem Specific Goal 1 (OT) SBA ambulation to/from bathroom with walker consistently for toileting needs.  -LE     Time Frame (Problem Specific Goal 1, OT) 2 weeks  -LE     Progress/Outcome (Problem Specific Goal 1, OT) goal ongoing  -LE       Row Name 04/14/24 1148          Therapy Assessment/Plan (OT)    Planned Therapy Interventions (OT) activity tolerance training;adaptive equipment training;BADL retraining;functional balance retraining;patient/caregiver education/training;strengthening exercise;transfer/mobility retraining  -LE               User Key  (r) = Recorded By, (t) = Taken By, (c) = Cosigned By      Initials Name Provider Type    Meran Boo OTR Occupational Therapist                   Clinical Impression       Row Name 04/14/24 1146          Pain Assessment    Pretreatment Pain Rating 0/10 - no pain  -LE       Row Name 04/14/24 1146          Plan of Care Review    Plan of Care Reviewed With patient;spouse  -LE     Outcome Evaluation Pt admit for heart and respiratory failure. Pt lives at home with spouse and was using walker for mobility. Pt presents to OT in bed, room dark,  present. With encouragement pt moves OOB, walks around to bed to sit up in chair with close SBA/CGA at walker.  OT ed benefit of activity and pacing as pt gets SOA easily.  OT ed on pursed lip breathing tech.   Pt with functional B UE.   Will cont to follow for skilled OT while in acute care.  Pt plans to return home with spouse assist as needed.  -LE       Row Name 04/14/24 1148          Therapy Assessment/Plan (OT)    Patient/Family  Therapy Goal Statement (OT) Return to prior level of function.  -LE     Rehab Potential (OT) good, to achieve stated therapy goals  -LE     Criteria for Skilled Therapeutic Interventions Met (OT) meets criteria;yes  -LE     Therapy Frequency (OT) 3 times/wk  -LE       Row Name 04/14/24 1146          Therapy Plan Review/Discharge Plan (OT)    Equipment Needs Upon Discharge (OT) walker, rolling;shower chair  has walker. home O2.  -LE     Anticipated Discharge Disposition (OT) home with assist  -LE       Row Name 04/14/24 1146          Vital Signs    Pre SpO2 (%) 94  -LE     O2 Delivery Pre Treatment supplemental O2  -LE     Intra SpO2 (%) 88  inconsistent Pleth  -LE     O2 Delivery Intra Treatment supplemental O2  -LE     Post SpO2 (%) 90  inconsistent Pleth  -LE     O2 Delivery Post Treatment supplemental O2  -LE     Pre Patient Position Supine  -LE     Intra Patient Position Standing  -LE     Post Patient Position Sitting  -LE       Row Name 04/14/24 1146          Positioning and Restraints    Pre-Treatment Position in bed  -LE     Post Treatment Position chair  ed pt to sit up in chair, OT opens blinds and encourages pt to increase mobility and time OOB.  spouse expresses appreciation that pt OOB.  -LE     In Chair notified nsg;reclined;call light within reach;encouraged to call for assist;exit alarm on;with family/caregiver  -LE               User Key  (r) = Recorded By, (t) = Taken By, (c) = Cosigned By      Initials Name Provider Type    Merna Boo OTR Occupational Therapist                   Outcome Measures       Row Name 04/14/24 1148          How much help from another is currently needed...    Putting on and taking off regular lower body clothing? 2  -LE     Bathing (including washing, rinsing, and drying) 3  -LE     Toileting (which includes using toilet bed pan or urinal) 1  -LE     Putting on and taking off regular upper body clothing 3  -LE     Taking care of personal grooming (such as brushing  teeth) 3  -LE     Eating meals 4  -LE     AM-PAC 6 Clicks Score (OT) 16  -LE       Row Name 04/14/24 0000          How much help from another person do you currently need...    Turning from your back to your side while in flat bed without using bedrails? 4  -MS     Moving from lying on back to sitting on the side of a flat bed without bedrails? 4  -MS     Moving to and from a bed to a chair (including a wheelchair)? 3  -MS     Standing up from a chair using your arms (e.g., wheelchair, bedside chair)? 3  -MS     Climbing 3-5 steps with a railing? 2  -MS     To walk in hospital room? 3  -MS     AM-PAC 6 Clicks Score (PT) 19  -MS     Highest Level of Mobility Goal 6 --> Walk 10 steps or more  -MS       Row Name 04/14/24 1148          Functional Assessment    Outcome Measure Options AM-PAC 6 Clicks Daily Activity (OT)  -LE               User Key  (r) = Recorded By, (t) = Taken By, (c) = Cosigned By      Initials Name Provider Type    Merna Boo OTR Occupational Therapist    Xochitl Nash RN Registered Nurse                    Occupational Therapy Education       Title: PT OT SLP Therapies (In Progress)       Topic: Occupational Therapy (In Progress)       Point: ADL training (Done)       Description:   Instruct learner(s) on proper safety adaptation and remediation techniques during self care or transfers.   Instruct in proper use of assistive devices.                  Learning Progress Summary             Patient Acceptance, E, Bed IU by DANDRE at 4/14/2024 1149    Comment: role of OT, plan of care, energy conservation tech, pacing, pursed lip breathing , benefit of activity   Family Acceptance, E, Bed IU by DANDRE at 4/14/2024 1149    Comment: role of OT, plan of care, energy conservation tech, pacing, pursed lip breathing , benefit of activity                         Point: Home exercise program (Not Started)       Description:   Instruct learner(s) on appropriate technique for monitoring, assisting and/or  progressing therapeutic exercises/activities.                  Learner Progress:  Not documented in this visit.              Point: Precautions (Done)       Description:   Instruct learner(s) on prescribed precautions during self-care and functional transfers.                  Learning Progress Summary             Patient Acceptance, E, Bed IU by DANDRE at 4/14/2024 1149    Comment: role of OT, plan of care, energy conservation tech, pacing, pursed lip breathing , benefit of activity   Family Acceptance, E, Bed IU by LE at 4/14/2024 1149    Comment: role of OT, plan of care, energy conservation tech, pacing, pursed lip breathing , benefit of activity                         Point: Body mechanics (Done)       Description:   Instruct learner(s) on proper positioning and spine alignment during self-care, functional mobility activities and/or exercises.                  Learning Progress Summary             Patient Acceptance, E, Bed IU by DANDRE at 4/14/2024 1149    Comment: role of OT, plan of care, energy conservation tech, pacing, pursed lip breathing , benefit of activity   Family Acceptance, E, Bed IU by DANDRE at 4/14/2024 1149    Comment: role of OT, plan of care, energy conservation tech, pacing, pursed lip breathing , benefit of activity                                         User Key       Initials Effective Dates Name Provider Type Discipline    DANDRE 06/16/21 -  Merna Barlow, OTR Occupational Therapist OT                  OT Recommendation and Plan  Planned Therapy Interventions (OT): activity tolerance training, adaptive equipment training, BADL retraining, functional balance retraining, patient/caregiver education/training, strengthening exercise, transfer/mobility retraining  Therapy Frequency (OT): 3 times/wk  Plan of Care Review  Plan of Care Reviewed With: patient, spouse  Outcome Evaluation: Pt admit for heart and respiratory failure. Pt lives at home with spouse and was using walker for mobility. Pt presents to  OT in bed, room dark,  present. With encouragement pt moves OOB, walks around to bed to sit up in chair with close SBA/CGA at walker.  OT ed benefit of activity and pacing as pt gets SOA easily.  OT ed on pursed lip breathing tech.   Pt with functional B UE.   Will cont to follow for skilled OT while in acute care.  Pt plans to return home with spouse assist as needed.     Time Calculation:   Evaluation Complexity (OT)  Review Occupational Profile/Medical/Therapy History Complexity: brief/low complexity  Assessment, Occupational Performance/Identification of Deficit Complexity: 1-3 performance deficits  Clinical Decision Making Complexity (OT): problem focused assessment/low complexity  Overall Complexity of Evaluation (OT): low complexity     Time Calculation- OT       Row Name 04/14/24 1150             Time Calculation- OT    OT Start Time 1109  -LE      OT Stop Time 1125  -LE      OT Time Calculation (min) 16 min  -LE      Total Timed Code Minutes- OT 8 minute(s)  -LE      OT Received On 04/14/24  -LE      OT - Next Appointment 04/15/24  -LE      OT Goal Re-Cert Due Date 04/28/24  -LE         Timed Charges    11510 - OT Therapeutic Activity Minutes 8  -LE         Total Minutes    Timed Charges Total Minutes 8  -LE       Total Minutes 8  -LE                User Key  (r) = Recorded By, (t) = Taken By, (c) = Cosigned By      Initials Name Provider Type    Merna Boo OTR Occupational Therapist                  Therapy Charges for Today       Code Description Service Date Service Provider Modifiers Qty    [id number]  OT THERAPEUTIC ACT EA 15 MIN 4/14/2024 Merna Barlow OTR GO 1    [id number]  OT EVAL LOW COMPLEXITY 2 4/14/2024 Merna Barlow OTR GO 1                 SCOT Negron  4/14/2024

## 2024-04-14 NOTE — PLAN OF CARE
Goal Outcome Evaluation:  Plan of Care Reviewed With: patient           Outcome Evaluation: Upon entering room, pt. supine in bed, awake/alert, and agreeable to work with P.T. this date despite c/o fatigue and SOA.  Pt. able to ambulate 12 feet, Min. assist x 2, with HHA x 2 this AM.  Pt. requires CGA x 1 for bed mobility and Min. assist x 2 for sit <-> stand transfers. BLE ther. ex. program x 10 reps completed for general strengthening.  Limited in upright mobility due to fatigue, SOA, and bathroom needs (BM).  Will continue to progress functional mobility as tolerated.

## 2024-04-14 NOTE — PROGRESS NOTES
Dr. ANGELA Gage    [address]        Patient ID:  Name:  Paz Browne  MRN:  [id number]  1955  68 y.o.  female            CC/Reason for visit: Acute respiratory failure on chronic respiratory failure, sepsis, bacteremia with Enterococcus     Interval hx: Says she feels tired.  She has shortness of breath at rest.  Still on oxygen 4 L, saturating 98%  Dry cough     ROS: No fever, no chest pain, no abdominal pain.  Complains of severe fatigue    Vitals:  Vitals:    04/14/24 0542 04/14/24 0629 04/14/24 0700 04/14/24 0709   BP: 128/54   114/57   BP Location:    Right arm   Patient Position:    Lying   Pulse: 86  82 75   Resp:   18 18   Temp:    97.9 °F (36.6 °C)   TempSrc:    Oral   SpO2:   97% 98%   Weight:  63.7 kg (140 lb 8 oz)     Height:               Body mass index is 22.01 kg/m².    Intake/Output Summary (Last 24 hours) at 4/14/2024 1251  Last data filed at 4/14/2024 0350  Gross per 24 hour   Intake --   Output 2000 ml   Net -2000 ml       Exam:  GEN:  No distress  Alert, oriented x 3.   LUNGS: Some scattered rhonchi bilat, no use of accessory muscles  CV:  Normal S1S2, without murmur, some ankle edema  ABD:  Non tender, no enlarged liver or masses      Scheduled meds:  ampicillin, 2 g, Intravenous, Q4H  [Held by provider] apixaban, 5 mg, Oral, Q12H  atorvastatin, 40 mg, Oral, Daily  budesonide, 0.5 mg, Nebulization, BID - RT  cefTRIAXone (ROCEPHIN) 2,000 mg in sodium chloride 0.9 % 100 mL MBP, 2,000 mg, Intravenous, Q12H  cetirizine, 10 mg, Oral, Daily  cyclobenzaprine, 10 mg, Oral, Nightly  dilTIAZem, 60 mg, Oral, Q6H  multivitamin with minerals, 1 tablet, Oral, Daily  pantoprazole, 40 mg, Intravenous, Q12H  roflumilast, 500 mcg, Oral, Daily  rOPINIRole, 2 mg, Oral, Nightly  sertraline, 100 mg, Oral, Daily  sodium chloride, 10 mL, Intravenous, Q12H  sodium chloride, 10 mL, Intravenous, Q12H  spironolactone, 25 mg, Oral, Daily      IV meds:                           Data Review:   I  reviewed the patient's medications and new clinical results.            Results from last 7 days   Lab Units 04/14/24  0545 04/13/24 2047 04/13/24  0550 04/12/24  1049 04/12/24  0534 04/12/24  0533 04/11/24  1800   SODIUM mmol/L 142  --  146*  --   --  144 144   POTASSIUM mmol/L 3.5 4.3 3.6   < >  --  3.6 3.1*   CHLORIDE mmol/L 95*  --  99  --   --  102 99   CO2 mmol/L 34.3*  --  36.2*  --   --  33.4* 33.0*   BUN mg/dL 8  --  9  --   --  8 9   CREATININE mg/dL 0.72  --  0.78  --   --  0.82 0.92   CALCIUM mg/dL 9.0  --  8.6  --   --  7.9* 8.2*   BILIRUBIN mg/dL  --   --  0.5  --   --  0.5  --    ALK PHOS U/L  --   --  66  --   --  69  --    ALT (SGPT) U/L  --   --  15  --   --  17  --    AST (SGOT) U/L  --   --  23  --   --  24  --    GLUCOSE mg/dL 114*  --  102*  --   --  126* 128*   WBC 10*3/mm3  --   --  11.01*  --  14.36*  --  15.16*   HEMOGLOBIN g/dL  --   --  8.7*  --  6.9*  --  7.2*   PLATELETS 10*3/mm3  --   --  248  --  384  --  422   PROBNP pg/mL  --   --   --   --   --   --  4,313.0*   PROCALCITONIN ng/mL 0.05  --   --   --   --   --  0.06    < > = values in this interval not displayed.     Results from last 7 days   Lab Units 04/12/24  0016 04/11/24 1938 04/11/24 1926   BLOODCX   --  No growth at 2 days No growth at 2 days   RESPCX  Rejected  --   --            ASSESSMENT:   Acute on chronic diastolic congestive heart failure    PAF (paroxysmal atrial fibrillation)    Chronic hypoxemic respiratory failure    Iron deficiency anemia    Pneumonia    Septicemia due to enterococcus    COPD (chronic obstructive pulmonary disease)  Volume overload, pulmonary edema  Replaced mitral valve  Pulmonary pretension      PLAN:  Continues to gradually improve.  Procalcitonin level is low.  Seems to be responding well to antibiotics.  She says she has shortness of breath at rest but on 4 L which is her home oxygen flow, her saturations are 98%.  Recheck proBNP level.  I explained to her that anemia can cause  significant shortness of breath.  Her COPD inhalers-optimized.  Continue same.  Not much else to add from the respiratory standpoint        David Gage MD  4/14/2024

## 2024-04-15 LAB
ANION GAP SERPL CALCULATED.3IONS-SCNC: 10 MMOL/L (ref 5–15)
BUN SERPL-MCNC: 9 MG/DL (ref 8–23)
BUN/CREAT SERPL: 13.2 (ref 7–25)
CALCIUM SPEC-SCNC: 8.8 MG/DL (ref 8.6–10.5)
CHLORIDE SERPL-SCNC: 94 MMOL/L (ref 98–107)
CO2 SERPL-SCNC: 38 MMOL/L (ref 22–29)
CREAT SERPL-MCNC: 0.68 MG/DL (ref 0.57–1)
DEPRECATED RDW RBC AUTO: 50.2 FL (ref 37–54)
EGFRCR SERPLBLD CKD-EPI 2021: 93.8 ML/MIN/1.73
ERYTHROCYTE [DISTWIDTH] IN BLOOD BY AUTOMATED COUNT: 16.2 % (ref 12.3–15.4)
GLUCOSE SERPL-MCNC: 128 MG/DL (ref 65–99)
HCT VFR BLD AUTO: 26.6 % (ref 34–46.6)
HCT VFR BLD AUTO: 29.2 % (ref 34–46.6)
HGB BLD-MCNC: 8.3 G/DL (ref 12–15.9)
HGB BLD-MCNC: 9.1 G/DL (ref 12–15.9)
MCH RBC QN AUTO: 27 PG (ref 26.6–33)
MCHC RBC AUTO-ENTMCNC: 31.2 G/DL (ref 31.5–35.7)
MCV RBC AUTO: 86.6 FL (ref 79–97)
PLATELET # BLD AUTO: 206 10*3/MM3 (ref 140–450)
PMV BLD AUTO: 10 FL (ref 6–12)
POTASSIUM SERPL-SCNC: 3.4 MMOL/L (ref 3.5–5.2)
POTASSIUM SERPL-SCNC: 3.8 MMOL/L (ref 3.5–5.2)
RBC # BLD AUTO: 3.07 10*6/MM3 (ref 3.77–5.28)
SODIUM SERPL-SCNC: 142 MMOL/L (ref 136–145)
WBC NRBC COR # BLD AUTO: 8.48 10*3/MM3 (ref 3.4–10.8)

## 2024-04-15 PROCEDURE — 99232 SBSQ HOSP IP/OBS MODERATE 35: CPT | Performed by: NURSE PRACTITIONER

## 2024-04-15 PROCEDURE — 97530 THERAPEUTIC ACTIVITIES: CPT

## 2024-04-15 PROCEDURE — 85027 COMPLETE CBC AUTOMATED: CPT | Performed by: HOSPITALIST

## 2024-04-15 PROCEDURE — 99232 SBSQ HOSP IP/OBS MODERATE 35: CPT | Performed by: INTERNAL MEDICINE

## 2024-04-15 PROCEDURE — 94664 DEMO&/EVAL PT USE INHALER: CPT

## 2024-04-15 PROCEDURE — 85018 HEMOGLOBIN: CPT | Performed by: STUDENT IN AN ORGANIZED HEALTH CARE EDUCATION/TRAINING PROGRAM

## 2024-04-15 PROCEDURE — 94760 N-INVAS EAR/PLS OXIMETRY 1: CPT

## 2024-04-15 PROCEDURE — 94799 UNLISTED PULMONARY SVC/PX: CPT

## 2024-04-15 PROCEDURE — 25010000002 BUMETANIDE PER 0.5 MG: Performed by: INTERNAL MEDICINE

## 2024-04-15 PROCEDURE — 25010000002 METHYLPREDNISOLONE PER 40 MG: Performed by: HOSPITALIST

## 2024-04-15 PROCEDURE — 25010000002 AMPICILLIN PER 500 MG: Performed by: NURSE PRACTITIONER

## 2024-04-15 PROCEDURE — 99233 SBSQ HOSP IP/OBS HIGH 50: CPT | Performed by: INTERNAL MEDICINE

## 2024-04-15 PROCEDURE — 80048 BASIC METABOLIC PNL TOTAL CA: CPT | Performed by: HOSPITALIST

## 2024-04-15 PROCEDURE — 84132 ASSAY OF SERUM POTASSIUM: CPT | Performed by: STUDENT IN AN ORGANIZED HEALTH CARE EDUCATION/TRAINING PROGRAM

## 2024-04-15 PROCEDURE — 25010000002 FUROSEMIDE PER 20 MG: Performed by: INTERNAL MEDICINE

## 2024-04-15 PROCEDURE — 85014 HEMATOCRIT: CPT | Performed by: STUDENT IN AN ORGANIZED HEALTH CARE EDUCATION/TRAINING PROGRAM

## 2024-04-15 PROCEDURE — 94761 N-INVAS EAR/PLS OXIMETRY MLT: CPT

## 2024-04-15 PROCEDURE — 25010000002 CEFTRIAXONE PER 250 MG: Performed by: INTERNAL MEDICINE

## 2024-04-15 PROCEDURE — 25010000002 ONDANSETRON PER 1 MG: Performed by: INTERNAL MEDICINE

## 2024-04-15 PROCEDURE — 97535 SELF CARE MNGMENT TRAINING: CPT

## 2024-04-15 RX ORDER — POTASSIUM CHLORIDE 750 MG/1
40 TABLET, FILM COATED, EXTENDED RELEASE ORAL EVERY 4 HOURS
Status: COMPLETED | OUTPATIENT
Start: 2024-04-15 | End: 2024-04-15

## 2024-04-15 RX ORDER — BUMETANIDE 0.25 MG/ML
2 INJECTION INTRAMUSCULAR; INTRAVENOUS EVERY 8 HOURS
Status: DISCONTINUED | OUTPATIENT
Start: 2024-04-15 | End: 2024-04-19

## 2024-04-15 RX ADMIN — ONDANSETRON 4 MG: 2 INJECTION INTRAMUSCULAR; INTRAVENOUS at 10:14

## 2024-04-15 RX ADMIN — BUMETANIDE 2 MG: 0.25 INJECTION, SOLUTION INTRAMUSCULAR; INTRAVENOUS at 16:51

## 2024-04-15 RX ADMIN — ONDANSETRON 4 MG: 2 INJECTION INTRAMUSCULAR; INTRAVENOUS at 21:22

## 2024-04-15 RX ADMIN — Medication 3 MG: at 21:19

## 2024-04-15 RX ADMIN — BUDESONIDE 0.5 MG: 0.5 INHALANT RESPIRATORY (INHALATION) at 19:22

## 2024-04-15 RX ADMIN — ROPINIROLE 2 MG: 2 TABLET, FILM COATED ORAL at 21:19

## 2024-04-15 RX ADMIN — DILTIAZEM HYDROCHLORIDE 60 MG: 60 TABLET, FILM COATED ORAL at 00:43

## 2024-04-15 RX ADMIN — HYDROCODONE BITARTRATE AND ACETAMINOPHEN 1 TABLET: 7.5; 325 TABLET ORAL at 13:26

## 2024-04-15 RX ADMIN — AMPICILLIN SODIUM 2 G: 2 INJECTION, POWDER, FOR SOLUTION INTRAVENOUS at 21:22

## 2024-04-15 RX ADMIN — HYDROCODONE BITARTRATE AND ACETAMINOPHEN 1 TABLET: 7.5; 325 TABLET ORAL at 21:21

## 2024-04-15 RX ADMIN — AMPICILLIN SODIUM 2 G: 2 INJECTION, POWDER, FOR SOLUTION INTRAVENOUS at 00:43

## 2024-04-15 RX ADMIN — DILTIAZEM HYDROCHLORIDE 60 MG: 60 TABLET, FILM COATED ORAL at 12:56

## 2024-04-15 RX ADMIN — AMPICILLIN SODIUM 2 G: 2 INJECTION, POWDER, FOR SOLUTION INTRAVENOUS at 08:18

## 2024-04-15 RX ADMIN — PANTOPRAZOLE SODIUM 40 MG: 40 INJECTION, POWDER, FOR SOLUTION INTRAVENOUS at 21:21

## 2024-04-15 RX ADMIN — Medication 10 ML: at 08:27

## 2024-04-15 RX ADMIN — POTASSIUM CHLORIDE 40 MEQ: 750 TABLET, EXTENDED RELEASE ORAL at 08:31

## 2024-04-15 RX ADMIN — Medication 1 TABLET: at 08:27

## 2024-04-15 RX ADMIN — AMPICILLIN SODIUM 2 G: 2 INJECTION, POWDER, FOR SOLUTION INTRAVENOUS at 16:50

## 2024-04-15 RX ADMIN — FUROSEMIDE 60 MG: 10 INJECTION, SOLUTION INTRAMUSCULAR; INTRAVENOUS at 07:37

## 2024-04-15 RX ADMIN — CETIRIZINE HYDROCHLORIDE 10 MG: 10 TABLET ORAL at 08:26

## 2024-04-15 RX ADMIN — SERTRALINE 100 MG: 100 TABLET, FILM COATED ORAL at 08:27

## 2024-04-15 RX ADMIN — METHYLPREDNISOLONE SODIUM SUCCINATE 40 MG: 40 INJECTION, POWDER, FOR SOLUTION INTRAMUSCULAR; INTRAVENOUS at 03:10

## 2024-04-15 RX ADMIN — ROFLUMILAST 500 MCG: 500 TABLET ORAL at 08:27

## 2024-04-15 RX ADMIN — HYDROCODONE BITARTRATE AND ACETAMINOPHEN 1 TABLET: 7.5; 325 TABLET ORAL at 03:10

## 2024-04-15 RX ADMIN — ALBUTEROL SULFATE 2.5 MG: 2.5 SOLUTION RESPIRATORY (INHALATION) at 19:22

## 2024-04-15 RX ADMIN — SPIRONOLACTONE 25 MG: 25 TABLET, FILM COATED ORAL at 08:27

## 2024-04-15 RX ADMIN — ALBUTEROL SULFATE 2.5 MG: 2.5 SOLUTION RESPIRATORY (INHALATION) at 13:06

## 2024-04-15 RX ADMIN — CEFTRIAXONE 2000 MG: 2 INJECTION, POWDER, FOR SOLUTION INTRAMUSCULAR; INTRAVENOUS at 08:17

## 2024-04-15 RX ADMIN — AMPICILLIN SODIUM 2 G: 2 INJECTION, POWDER, FOR SOLUTION INTRAVENOUS at 05:10

## 2024-04-15 RX ADMIN — ALBUTEROL SULFATE 2.5 MG: 2.5 SOLUTION RESPIRATORY (INHALATION) at 08:44

## 2024-04-15 RX ADMIN — POTASSIUM CHLORIDE 40 MEQ: 750 TABLET, EXTENDED RELEASE ORAL at 12:56

## 2024-04-15 RX ADMIN — CEFTRIAXONE 2000 MG: 2 INJECTION, POWDER, FOR SOLUTION INTRAMUSCULAR; INTRAVENOUS at 21:37

## 2024-04-15 RX ADMIN — METHYLPREDNISOLONE SODIUM SUCCINATE 40 MG: 40 INJECTION, POWDER, FOR SOLUTION INTRAMUSCULAR; INTRAVENOUS at 15:34

## 2024-04-15 RX ADMIN — ATORVASTATIN CALCIUM 40 MG: 20 TABLET, FILM COATED ORAL at 08:27

## 2024-04-15 RX ADMIN — CYCLOBENZAPRINE 10 MG: 10 TABLET, FILM COATED ORAL at 21:20

## 2024-04-15 RX ADMIN — AMPICILLIN SODIUM 2 G: 2 INJECTION, POWDER, FOR SOLUTION INTRAVENOUS at 13:06

## 2024-04-15 RX ADMIN — DILTIAZEM HYDROCHLORIDE 60 MG: 60 TABLET, FILM COATED ORAL at 18:18

## 2024-04-15 RX ADMIN — PANTOPRAZOLE SODIUM 40 MG: 40 INJECTION, POWDER, FOR SOLUTION INTRAVENOUS at 08:18

## 2024-04-15 RX ADMIN — Medication 10 ML: at 21:23

## 2024-04-15 RX ADMIN — BUDESONIDE 0.5 MG: 0.5 INHALANT RESPIRATORY (INHALATION) at 08:47

## 2024-04-15 RX ADMIN — DILTIAZEM HYDROCHLORIDE 60 MG: 60 TABLET, FILM COATED ORAL at 07:37

## 2024-04-15 NOTE — PROGRESS NOTES
"   LOS: 4 days   Patient Care Team:  Javan Martinez MD as PCP - General (Internal Medicine)  Ag Melo Jr., MD as Consulting Physician (Pulmonary Disease)  Cleveland Devine MD as Consulting Physician (Gastroenterology)  Earnest Gan MD as Consulting Physician (Cardiology)  Daniela Shin MD PhD as Consulting Physician (Hematology and Oncology)    Chief Complaint:       Interval History:       Objective   Vital Signs  Temp:  [98.1 °F (36.7 °C)-98.6 °F (37 °C)] 98.1 °F (36.7 °C)  Heart Rate:  [] 100  Resp:  [18-20] 18  BP: (105-141)/(56-75) 105/75    Intake/Output Summary (Last 24 hours) at 4/15/2024 1526  Last data filed at 4/15/2024 0936  Gross per 24 hour   Intake --   Output 1500 ml   Net -1500 ml       Last Weight and Admission Weight        04/15/24  0508   Weight: 69.3 kg (152 lb 12.5 oz)     Flowsheet Rows      Flowsheet Row First Filed Value   Admission Height 170.2 cm (67\") Documented at 04/11/2024 1752   Admission Weight 59 kg (130 lb) Documented at 04/11/2024 1752                    Physical Exam  Constitutional:       Comments: SOA w speaking   HENT:      Head: Normocephalic.      Mouth/Throat:      Pharynx: Oropharynx is clear.   Eyes:      Conjunctiva/sclera: Conjunctivae normal.   Cardiovascular:      Rate and Rhythm: Normal rate. Rhythm irregular.   Pulmonary:      Comments: Poor air movement, very reduced at ases  Abdominal:      Palpations: Abdomen is soft.      Tenderness: There is no abdominal tenderness.   Musculoskeletal:         General: No swelling.   Neurological:      General: No focal deficit present.         Results Review:      Results from last 7 days   Lab Units 04/15/24  0509 04/14/24  0545 04/13/24  2047 04/13/24  0550   SODIUM mmol/L 142 142  --  146*   POTASSIUM mmol/L 3.4* 3.5 4.3 3.6   CHLORIDE mmol/L 94* 95*  --  99   CO2 mmol/L 38.0* 34.3*  --  36.2*   BUN mg/dL 9 8  --  9   CREATININE mg/dL 0.68 0.72  --  0.78   GLUCOSE mg/dL 128* 114*  --  102* "   CALCIUM mg/dL 8.8 9.0  --  8.6         Results from last 7 days   Lab Units 04/15/24  0509 04/13/24  0550 04/12/24  0534   WBC 10*3/mm3 8.48 11.01* 14.36*   HEMOGLOBIN g/dL 8.3* 8.7* 6.9*   HEMATOCRIT % 26.6* 28.6* 22.4*   PLATELETS 10*3/mm3 206 248 384             Results from last 7 days   Lab Units 04/13/24  2047   MAGNESIUM mg/dL 2.0           I reviewed the patient's new clinical results.  I personally viewed and interpreted the patient's EKG/Telemetry data        Medication Review:   ampicillin, 2 g, Intravenous, Q4H  [Held by provider] apixaban, 5 mg, Oral, Q12H  atorvastatin, 40 mg, Oral, Daily  budesonide, 0.5 mg, Nebulization, BID - RT  cefTRIAXone (ROCEPHIN) 2,000 mg in sodium chloride 0.9 % 100 mL MBP, 2,000 mg, Intravenous, Q12H  cetirizine, 10 mg, Oral, Daily  cyclobenzaprine, 10 mg, Oral, Nightly  dilTIAZem, 60 mg, Oral, Q6H  furosemide, 60 mg, Intravenous, Q12H  methylPREDNISolone sodium succinate, 40 mg, Intravenous, Q12H  multivitamin with minerals, 1 tablet, Oral, Daily  pantoprazole, 40 mg, Intravenous, Q12H  roflumilast, 500 mcg, Oral, Daily  rOPINIRole, 2 mg, Oral, Nightly  sertraline, 100 mg, Oral, Daily  sodium chloride, 10 mL, Intravenous, Q12H  sodium chloride, 10 mL, Intravenous, Q12H  spironolactone, 25 mg, Oral, Daily             Assessment & Plan     1. Acute on chronic HFpEF  2. PAF  3. Chronic hypoxemic respiratory failure/COPD  4. Enterococcus bacteremia  5. History of MV replacement  6. Anemia requiring transfusion    She continues to have a higher oxygen requirement than she does at baseline and she remains profoundly short of breath.  She is not moving air well.  Her chest x-ray yesterday is not overwhelming but she does have some scattered pulmonary edema and small effusions.  Her weights are completely unreliable as she has clearly had an excellent diuresis, subjectively, but her weight is reportedly 22 pounds higher than it was upon admission.  I am going to intensify her  diuretic regimen to IV bumetanide 2 mg every 8 hours.  She will remain on spironolactone.    Her heart rate is generally controlled.  There is not much in the way of blood pressure room to intensify her calcium channel blocker dose.  We are trying to avoid beta-blockers due to her lung disease.    Her hemoglobin remains somewhat low but stable.  She would ultimately benefit from GI evaluation but I agree that her cardiopulmonary status should be better before we undertake this, unless she were to have life-threatening bleeding, in which case it would be necessary to proceed.    Rodolfo Null MD  04/15/24  15:26 EDT

## 2024-04-15 NOTE — THERAPY TREATMENT NOTE
Patient Name: Paz Browne  : 1955    MRN: [id number]                              Today's Date: 4/15/2024       Admit Date: 2024    Visit Dx:     ICD-10-CM ICD-9-CM   1. Sepsis, due to unspecified organism, unspecified whether acute organ dysfunction present  A41.9 038.9     995.91   2. Acute on chronic congestive heart failure, unspecified heart failure type  I50.9 428.0   3. Atrial fibrillation with RVR  I48.91 427.31   4. History of atrial fibrillation  Z86.79 V12.59   5. Chronic anticoagulation  Z79.01 V58.61   6. Hypoxia  R09.02 799.02   7. History of COPD  Z87.09 V12.69   8. History of endocarditis  Z86.79 V12.59     Patient Active Problem List   Diagnosis    PAF (paroxysmal atrial fibrillation)    Hypertension    Hyperlipidemia    Pain medication agreement    Hiatal hernia    Primary osteoarthritis involving multiple joints    Pulmonary hypertension    S/P TVR (tricuspid valve repair)    Pulmonary nodule    Restless leg syndrome    Chronic low back pain    Dysplastic polyp of colon    History of mitral valve replacement with tissue graft    Chronic hypoxemic respiratory failure    Anemia    Celiac artery stenosis    Intertrigo    Abnormal EKG    Muscle spasms of both lower extremities    Iron deficiency anemia    Adenomatous polyp of colon    Gastroesophageal reflux disease without esophagitis    Headache    History of endocarditis    Pneumonia of both lower lobes due to infectious organism    Peptic ulcer disease    Peripheral vascular disease    Hyperlipidemia    Hyperglycemia    COPD with acute exacerbation    Chronic diastolic CHF (congestive heart failure)    Chronic bilateral low back pain    COPD exacerbation    Leukocytosis    Elevated troponin    Pneumonia    Acute-on-chronic respiratory failure    Septicemia due to enterococcus    Acute on chronic diastolic congestive heart failure    Sepsis    COPD (chronic obstructive pulmonary disease)     Past Medical History:   Diagnosis Date     VAN (acute kidney injury)     Anemia     Asthma     Atrial flutter     cardioversion    Cataract     Celiac artery stenosis     Chronic respiratory failure with hypoxia     Colon polyp     COPD (chronic obstructive pulmonary disease)     GI bleed     Hiatal hernia     History of CHF (congestive heart failure)     due to MR    History of home oxygen therapy     3 lpm NC    History of mitral valve replacement with tissue graft     Hyperlipidemia     Hypertension     Infectious viral hepatitis     B    Intertrigo     Long term (current) use of anticoagulants     Mitral regurgitation     s/p tissue MVR    PAF (paroxysmal atrial fibrillation)     s/p MAZE    Pneumonia     Pulmonary hypertension     S/P TVR (tricuspid valve repair) 7/7/2016     Past Surgical History:   Procedure Laterality Date    CARDIAC CATHETERIZATION  09/01/2014    Right dominant systemt, normal coronary arteries.     CARDIAC CATHETERIZATION Left 6/10/2016    Procedure: Cardiac catheterization;  Surgeon: Sergei Hall MD;  Location: [facility] CATH INVASIVE LOCATION;  Service:     CARDIAC CATHETERIZATION N/A 6/10/2016    Procedure: Right Heart Cath;  Surgeon: Sergei Hall MD;  [facility] CATH INVASIVE LOCATION;  Service:     CATARACT EXTRACTION      COLONOSCOPY      COLONOSCOPY N/A 8/4/2017    Procedure: COLONOSCOPY TO CECUM/TI WITH POLYPECTOMY ( COLD BX);  Surgeon: Cleveland Devine MD;  Location: [facility] ENDOSCOPY;  Service:     COLONOSCOPY N/A 8/10/2017    Procedure: COLONOSCOPY to cecum and TI with 2 clips placed at transverse;  Surgeon: Earnest PALOMO MD;  Location: [facility] ENDOSCOPY;  Service:     COLONOSCOPY N/A 12/22/2017    Procedure: COLONOSCOPY INTO CECUM WITH COLD POLYPECTOMIES;  Surgeon: Cleveland Devine MD;  Location: [facility] ENDOSCOPY;  Service:     COLONOSCOPY N/A 5/17/2021    Procedure: COLONOSCOPY to cecum;  Surgeon: Cleveland Devine MD;  Location: [facility] ENDOSCOPY;  Service: Gastroenterology;  Laterality: N/A;  Pre: Fe deficency anemia, h/x  of polyps  Post: fair prep, normal    ENDOSCOPY N/A 8/17/2017    Procedure: ESOPHAGOGASTRODUODENOSCOPY;  Surgeon: Porsha Ruby MD;  Location: [facility] ENDOSCOPY;  Service:     ENDOSCOPY N/A 12/22/2017    Procedure: ESOPHAGOGASTRODUODENOSCOPY WITH BIOPSIES;  Surgeon: Cleveland Devine MD;  Location: [facility] ENDOSCOPY;  Service:     ENDOSCOPY N/A 5/17/2021    Procedure: ESOPHAGOGASTRODUODENOSCOPY  with biopsies;  Surgeon: Cleveland Devine MD;  Location: [facility] ENDOSCOPY;  Service: Gastroenterology;  Laterality: N/A;  Pre: Fe deficency anemia, nausea, heme positive stool   Post: gastritis, sloughing of distal esophagus mucosa    GALLBLADDER SURGERY      HEMORRHOIDECTOMY      HYSTERECTOMY      KIDNEY SURGERY  04/22/2013    Stent placement    MAZE PROCEDURE      MITRAL VALVE REPLACEMENT      TONSILLECTOMY      TRICUSPID VALVE REPLACEMENT        General Information       Row Name 04/15/24 1310          OT Time and Intention    Document Type therapy note (daily note)  -AG     Mode of Treatment co-treatment;physical therapy;occupational therapy  cots for safe pt handling and mobility progression  -AG       Row Name 04/15/24 1310          General Information    Existing Precautions/Restrictions fall;oxygen therapy device and L/min  4L  -AG       Row Name 04/15/24 1310          Cognition    Orientation Status (Cognition) oriented x 3  -AG       Row Name 04/15/24 1310          Safety Issues, Functional Mobility    Safety Issues Affecting Function (Mobility) impulsivity;insight into deficits/self-awareness;judgment;problem-solving;safety precaution awareness  -AG     Impairments Affecting Function (Mobility) balance;endurance/activity tolerance;shortness of breath  -AG               User Key  (r) = Recorded By, (t) = Taken By, (c) = Cosigned By      Initials Name Provider Type    AG Franc Mcdermott OT Occupational Therapist                     Mobility/ADL's       Row Name 04/15/24 1311          Bed Mobility    Bed Mobility  supine-sit  -AG     Supine-Sit [place] (Bed Mobility) standby assist  -AG     Assistive Device (Bed Mobility) bed rails;head of bed elevated  -AG       Row Name 04/15/24 1311          Transfers    Transfers bed-chair transfer;sit-stand transfer  -AG       Row Name 04/15/24 1311          Bed-Chair Transfer    Bed-Chair [place] (Transfers) standby assist  -AG     Assistive Device (Bed-Chair Transfers) walker, front-wheeled  -AG       Row Name 04/15/24 1311          Sit-Stand Transfer    Sit-Stand [place] (Transfers) 2 person assist;contact guard;1 person to manage equipment  -AG     Assistive Device (Sit-Stand Transfers) walker, front-wheeled  -AG       Row Name 04/15/24 1311          Toilet Transfer    Comment, (Toilet Transfer) deferred need for toielting this AM  -AG       Row Name 04/15/24 1311          Functional Mobility    Functional Mobility- Comment bed > doorway > recliner w RW and cues for pacing and breathing techniques CGA  -AG       Row Name 04/15/24 1311          Activities of Daily Living    BADL Assessment/Intervention grooming;upper body dressing  -AG       Row Name 04/15/24 1311          Upper Body Dressing Assessment/Training    [place] Level (Upper Body Dressing) upper body dressing skills;front opening garment;set up  -AG     Position (Upper Body Dressing) edge of bed sitting  -AG     Comment, (Upper Body Dressing) adjusting gown  -AG       Row Name 04/15/24 1311          Grooming Assessment/Training    [place] Level (Grooming) grooming skills;wash face, hands;set up  -AG     Position (Grooming) supported sitting  -AG               User Key  (r) = Recorded By, (t) = Taken By, (c) = Cosigned By      Initials Name Provider Type    AG Franc Mcdermott OT Occupational Therapist                   Obj/Interventions       Row Name 04/15/24 1315          Motor Skills    Therapeutic Exercise --  HEP provided for BUE ther ex with cues for pacing and breathing techniques  -AG        Row Name 04/15/24 1315          Balance    Balance Assessment sitting static balance;sitting dynamic balance;sit to stand dynamic balance;standing static balance;standing dynamic balance  -AG     Static Sitting Balance independent  -AG     Dynamic Sitting Balance standby assist  -AG     Position, Sitting Balance unsupported;sitting edge of bed  -AG     Sit to Stand Dynamic Balance contact guard  -AG     Static Standing Balance standby assist  -AG     Dynamic Standing Balance contact guard  -AG     Position/Device Used, Standing Balance supported;walker, front-wheeled  -AG     Balance Interventions sitting;standing;sit to stand;supported;static;dynamic;moderate challenge;occupation based/functional task  -AG     Comment, Balance pt. is impulsive however no LOB noted, standing ~5 mins, seated ~10mins  -AG               User Key  (r) = Recorded By, (t) = Taken By, (c) = Cosigned By      Initials Name Provider Type    AG Franc Mcdermott, HILARIO Occupational Therapist                   Goals/Plan    No documentation.                  Clinical Impression       Row Name 04/15/24 1317          Pain Assessment    Pretreatment Pain Rating 0/10 - no pain  -AG     Posttreatment Pain Rating 0/10 - no pain  -AG       Row Name 04/15/24 1317          Plan of Care Review    Plan of Care Reviewed With patient;spouse  -AG     Progress improving  -AG     Outcome Evaluation pt agreeable to OT/PT session, demo's continued deficits with act lai and strength requiring cues for pacing, energy conservation and pursed lip breathing methods to reduce SOA. Pt. was able to transfer to EOB and progress into standing w RW. Pt. completed func mob in room before returning to [facility]. Pt. was provided BUE ther ex to address deficits in act lai and strength while also working on breathing techniques. OT to continue to follow and progress as able,  -AG       Row Name 04/15/24 1317          Therapy Assessment/Plan (OT)    Rehab Potential (OT) good, to  achieve stated therapy goals  -AG     Criteria for Skilled Therapeutic Interventions Met (OT) meets criteria;yes  -AG     Therapy Frequency (OT) 3 times/wk  -AG       Row Name 04/15/24 1317          Therapy Plan Review/Discharge Plan (OT)    Anticipated Discharge Disposition (OT) home with assist  -AG       Row Name 04/15/24 1317          Vital Signs    Pre SpO2 (%) 96  -AG     O2 Delivery Pre Treatment supplemental O2  -AG     Intra SpO2 (%) 83  -AG     O2 Delivery Intra Treatment supplemental O2  -AG     Post SpO2 (%) 89  -AG     O2 Delivery Post Treatment supplemental O2  -AG       Row Name 04/15/24 1317          Positioning and Restraints    Pre-Treatment Position in bed  -AG     Post Treatment Position chair  -AG     In Chair notified nsg;sitting;call light within reach;encouraged to call for assist;exit alarm on;with family/caregiver  -AG               User Key  (r) = Recorded By, (t) = Taken By, (c) = Cosigned By      Initials Name Provider Type    AG Franc Mcdermott, OT Occupational Therapist                   Outcome Measures       Row Name 04/15/24 1320          How much help from another is currently needed...    Putting on and taking off regular lower body clothing? 2  -AG     Bathing (including washing, rinsing, and drying) 3  -AG     Toileting (which includes using toilet bed pan or urinal) 1  -AG     Putting on and taking off regular upper body clothing 3  -AG     Taking care of personal grooming (such as brushing teeth) 3  -AG     Eating meals 4  -AG     AM-PAC 6 Clicks Score (OT) 16  -AG       Row Name 04/15/24 1251          How much help from another person do you currently need...    Turning from your back to your side while in flat bed without using bedrails? 4  -JM     Moving from lying on back to sitting on the side of a flat bed without bedrails? 3  -JM     Moving to and from a bed to a chair (including a wheelchair)? 3  -JM     Standing up from a chair using your arms (e.g., wheelchair,  bedside chair)? 3  -JM     Climbing 3-5 steps with a railing? 1  -JM     To walk in hospital room? 3  -JM     AM-PAC 6 Clicks Score (PT) 17  -JM     Highest Level of Mobility Goal 5 --> Static standing  -       Row Name 04/15/24 1320          Functional Assessment    Outcome Measure Options AM-PAC 6 Clicks Daily Activity (OT)  -AG               User Key  (r) = Recorded By, (t) = Taken By, (c) = Cosigned By      Initials Name Provider Type    Trish Hernandez PTA Physical Therapist Assistant    Franc Ponce OT Occupational Therapist                    Occupational Therapy Education       Title: PT OT SLP Therapies (In Progress)       Topic: Occupational Therapy (In Progress)       Point: ADL training (Done)       Description:   Instruct learner(s) on proper safety adaptation and remediation techniques during self care or transfers.   Instruct in proper use of assistive devices.                  Learning Progress Summary             Patient Acceptance, E, Bed IU by LE at 4/14/2024 1149    Comment: role of OT, plan of care, energy conservation tech, pacing, pursed lip breathing , benefit of activity   Family Acceptance, E, Bed IU by LE at 4/14/2024 1149    Comment: role of OT, plan of care, energy conservation tech, pacing, pursed lip breathing , benefit of activity                         Point: Home exercise program (Not Started)       Description:   Instruct learner(s) on appropriate technique for monitoring, assisting and/or progressing therapeutic exercises/activities.                  Learner Progress:  Not documented in this visit.              Point: Precautions (Done)       Description:   Instruct learner(s) on prescribed precautions during self-care and functional transfers.                  Learning Progress Summary             Patient Acceptance, E, Bed IU by LE at 4/14/2024 1149    Comment: role of OT, plan of care, energy conservation tech, pacing, pursed lip breathing , benefit of activity    Family Acceptance, E, Bed IU by  at 4/14/2024 1149    Comment: role of OT, plan of care, energy conservation tech, pacing, pursed lip breathing , benefit of activity                         Point: Body mechanics (Done)       Description:   Instruct learner(s) on proper positioning and spine alignment during self-care, functional mobility activities and/or exercises.                  Learning Progress Summary             Patient Acceptance, E, Bed IU by  at 4/14/2024 1149    Comment: role of OT, plan of care, energy conservation tech, pacing, pursed lip breathing , benefit of activity   Family Acceptance, E, Bed IU by  at 4/14/2024 1149    Comment: role of OT, plan of care, energy conservation tech, pacing, pursed lip breathing , benefit of activity                                         User Key       Initials Effective Dates Name Provider Type Discipline     06/16/21 -  Merna Barlow OTR Occupational Therapist OT                  OT Recommendation and Plan  Therapy Frequency (OT): 3 times/wk  Plan of Care Review  Plan of Care Reviewed With: patient, spouse  Progress: improving  Outcome Evaluation: pt agreeable to OT/PT session, demo's continued deficits with act lai and strength requiring cues for pacing, energy conservation and pursed lip breathing methods to reduce SOA. Pt. was able to transfer to EOB and progress into standing w RW. Pt. completed func mob in room before returning to [facility]. Pt. was provided BUE ther ex to address deficits in act lai and strength while also working on breathing techniques. OT to continue to follow and progress as able,     Time Calculation:         Time Calculation- OT       Row Name 04/15/24 1320             Time Calculation- OT    OT Start Time 1050  -AG      OT Stop Time 1114  -AG      OT Time Calculation (min) 24 min  -AG      Total Timed Code Minutes- OT 24 minute(s)  -AG      OT Received On 04/15/24  -AG      OT - Next Appointment 04/17/24  -      OT Goal Re-Cert  Due Date 04/28/24  -AG         Timed Charges    50481 - OT Therapeutic Exercise Minutes 6  -AG      75833 - OT Therapeutic Activity Minutes 8  -AG      20580 - OT Self Care/Mgmt Minutes 10  -AG         Total Minutes    Timed Charges Total Minutes 24  -AG       Total Minutes 24  -AG                User Key  (r) = Recorded By, (t) = Taken By, (c) = Cosigned By      Initials Name Provider Type     Franc Mcdermott OT Occupational Therapist                  Therapy Charges for Today       Code Description Service Date Service Provider Modifiers Qty    79058098107 HC OT THERAPEUTIC ACT EA 15 MIN 4/15/2024 Franc Mcdermott, OT GO 1    67159801479 HC OT SELF CARE/MGMT/TRAIN EA 15 MIN 4/15/2024 Franc Mcdermott OT GO 1                 Franc Mcdermott OT  4/15/2024

## 2024-04-15 NOTE — PLAN OF CARE
Goal Outcome Evaluation:  Plan of Care Reviewed With: patient, spouse        Progress: improving  Outcome Evaluation: Pt agreed to PT/OT session, pt lai bed mobility with assist of 1, pt educ to slow pace due to impulsivity and incr SOA w/activity; pt amb ~20ft w/rwx assist of 2 due to need for equipment assist and safety, pt plans home w/HH when medically ready, spouse to assist, issued HEP for UE/LE strengthening and stretching exer for pt and husb to perf ad bob as oxygen needs allow      Anticipated Discharge Disposition (PT): home with assist, home with home health

## 2024-04-15 NOTE — PROGRESS NOTES
Dr. ANGEAL Gage    [address]        Patient ID:  Name:  Paz Browne  MRN:  [id number]  1955  68 y.o.  female            CC/Reason for visit: Acute respiratory failure on chronic respiratory failure, sepsis, bacteremia with Enterococcus     Interval hx: Says she feels tired.  Short of breath with any minimal exertion.  Unchanged from baseline.  Saturating 97% on 4 L, unchanged from baseline      ROS: No fever, no chest pain, no abdominal pain.  Complains of severe fatigue    Vitals:  Vitals:    04/15/24 0854 04/15/24 1203 04/15/24 1306 04/15/24 1310   BP:  105/75     BP Location:  Right arm     Patient Position:  Sitting     Pulse: 92 89 99 100   Resp: 20 18 18 18   Temp:  98.1 °F (36.7 °C)     TempSrc:  Oral     SpO2:  91% 91%    Weight:       Height:               Body mass index is 23.93 kg/m².    Intake/Output Summary (Last 24 hours) at 4/15/2024 1903  Last data filed at 4/15/2024 0936  Gross per 24 hour   Intake 120 ml   Output 800 ml   Net -680 ml       Exam:  GEN:  No distress  Alert, oriented x 3.   LUNGS: Barrel chest, distant and diminished breath sounds but no wheezing  bilat, no use of accessory muscles  CV:  Normal S1S2, without murmur, no edema  ABD:  Non tender, no enlarged liver or masses      Scheduled meds:  ampicillin, 2 g, Intravenous, Q4H  [Held by provider] apixaban, 5 mg, Oral, Q12H  atorvastatin, 40 mg, Oral, Daily  budesonide, 0.5 mg, Nebulization, BID - RT  bumetanide, 2 mg, Intravenous, Q8H  cefTRIAXone (ROCEPHIN) 2,000 mg in sodium chloride 0.9 % 100 mL MBP, 2,000 mg, Intravenous, Q12H  cetirizine, 10 mg, Oral, Daily  cyclobenzaprine, 10 mg, Oral, Nightly  dilTIAZem, 60 mg, Oral, Q6H  methylPREDNISolone sodium succinate, 40 mg, Intravenous, Q12H  multivitamin with minerals, 1 tablet, Oral, Daily  pantoprazole, 40 mg, Intravenous, Q12H  roflumilast, 500 mcg, Oral, Daily  rOPINIRole, 2 mg, Oral, Nightly  sertraline, 100 mg, Oral, Daily  sodium chloride, 10 mL,  Intravenous, Q12H  sodium chloride, 10 mL, Intravenous, Q12H  spironolactone, 25 mg, Oral, Daily      IV meds:                           Data Review:   I reviewed the patient's medications and new clinical results.    COVID19   Date Value Ref Range Status   04/11/2024 Not Detected Not Detected - Ref. Range Final         Lab Results   Component Value Date    CALCIUM 8.8 04/15/2024    PHOS 3.0 04/13/2024    MG 2.0 04/13/2024    MG 2.0 04/06/2024    MG 1.9 02/17/2024     Results from last 7 days   Lab Units 04/15/24  1825 04/15/24  1649 04/15/24  0509 04/14/24  0545 04/13/24  2047 04/13/24  0550 04/12/24  1049 04/12/24  0534 04/12/24  0533 04/11/24  1800   SODIUM mmol/L  --   --  142 142  --  146*  --   --  144 144   POTASSIUM mmol/L  --  3.8 3.4* 3.5   < > 3.6   < >  --  3.6 3.1*   CHLORIDE mmol/L  --   --  94* 95*  --  99  --   --  102 99   CO2 mmol/L  --   --  38.0* 34.3*  --  36.2*  --   --  33.4* 33.0*   BUN mg/dL  --   --  9 8  --  9  --   --  8 9   CREATININE mg/dL  --   --  0.68 0.72  --  0.78  --   --  0.82 0.92   CALCIUM mg/dL  --   --  8.8 9.0  --  8.6  --   --  7.9* 8.2*   BILIRUBIN mg/dL  --   --   --   --   --  0.5  --   --  0.5  --    ALK PHOS U/L  --   --   --   --   --  66  --   --  69  --    ALT (SGPT) U/L  --   --   --   --   --  15  --   --  17  --    AST (SGOT) U/L  --   --   --   --   --  23  --   --  24  --    GLUCOSE mg/dL  --   --  128* 114*  --  102*  --   --  126* 128*   WBC 10*3/mm3  --   --  8.48  --   --  11.01*  --  14.36*  --  15.16*   HEMOGLOBIN g/dL 9.1*  --  8.3*  --   --  8.7*  --  6.9*  --  7.2*   PLATELETS 10*3/mm3  --   --  206  --   --  248  --  384  --  422   PROBNP pg/mL  --   --   --   --   --   --   --   --   --  4,313.0*   PROCALCITONIN ng/mL  --   --   --  0.05  --   --   --   --   --  0.06    < > = values in this interval not displayed.     Results from last 7 days   Lab Units 04/13/24 2042 04/12/24 0016 04/11/24 1938 04/11/24 1926   BLOODCX   --   --  No growth at 3  days No growth at 3 days   RESPCX  Rejected Rejected  --   --              ASSESSMENT:   Acute on chronic diastolic congestive heart failure    PAF (paroxysmal atrial fibrillation)    Chronic hypoxemic respiratory failure    Iron deficiency anemia    Pneumonia    Septicemia due to enterococcus    COPD (chronic obstructive pulmonary disease)  Volume overload, pulmonary edema  Replaced mitral valve  Pulmonary pretension      PLAN:  Continue with IV antibiotics for septicemia.  COPD is severe, advanced but at baseline, stable.  Continue maintenance bronchodilator regimen for COPD.  Not much else to add from the respiratory standpoint.  She is at baseline oxygen requirements        David Gage MD  4/15/2024

## 2024-04-15 NOTE — PROGRESS NOTES
Name: Paz Browne ADMIT: 2024   : 1955  PCP: Javan Martinez MD    MRN: [id number] LOS: 4 days   AGE/SEX: 68 y.o. female  ROOM: [facility]     Subjective   Subjective   Patient seen this morning,  present in the room.  Still shortness of breath, unchanged.  Having dark stools but no bright red blood per spouse.  On 4 L nasal cannula which is her baseline.  No chest pain, palpitations.  No fevers or chills.  Feels nauseous but no vomiting.    Review of Systems   As above  Objective   Objective   Vital Signs  Temp:  [98.1 °F (36.7 °C)-98.6 °F (37 °C)] 98.1 °F (36.7 °C)  Heart Rate:  [] 100  Resp:  [18-20] 18  BP: (105-141)/(56-75) 105/75  SpO2:  [89 %-99 %] 91 %  on  Flow (L/min):  [4] 4;   Device (Oxygen Therapy): nasal cannula  Body mass index is 23.93 kg/m².  Physical Exam    General: Alert, sitting up in the bed, not in distress, ill-appearing  HEENT: Normocephalic, atraumatic  CV: Irregular rhythm, tachycardic  Lungs: Diminished bilaterally, rhonchi, tachypneic  Abdomen: Soft, nontender, nondistended  Extremities: No significant peripheral edema , no cyanosis     Results Review     I reviewed the patient's new clinical results.  Results from last 7 days   Lab Units 04/15/24  0509 24  0550 24  0534 24  1800   WBC 10*3/mm3 8.48 11.01* 14.36* 15.16*   HEMOGLOBIN g/dL 8.3* 8.7* 6.9* 7.2*   PLATELETS 10*3/mm3 206 248 384 422     Results from last 7 days   Lab Units 04/15/24  0509 24  0545 24  2047 24  0550 24  1049 24  0533   SODIUM mmol/L 142 142  --  146*  --  144   POTASSIUM mmol/L 3.4* 3.5 4.3 3.6   < > 3.6   CHLORIDE mmol/L 94* 95*  --  99  --  102   CO2 mmol/L 38.0* 34.3*  --  36.2*  --  33.4*   BUN mg/dL 9 8  --  9  --  8   CREATININE mg/dL 0.68 0.72  --  0.78  --  0.82   GLUCOSE mg/dL 128* 114*  --  102*  --  126*    < > = values in this interval not displayed.   Estimated Creatinine Clearance: 84.2 mL/min (by C-G formula based on  "SCr of 0.68 mg/dL).  Results from last 7 days   Lab Units 04/13/24  0550 04/12/24  0533   ALBUMIN g/dL 3.3* 3.5   BILIRUBIN mg/dL 0.5 0.5   ALK PHOS U/L 66 69   AST (SGOT) U/L 23 24   ALT (SGPT) U/L 15 17     Results from last 7 days   Lab Units 04/15/24  0509 04/14/24  0545 04/13/24 2047 04/13/24  0550 04/12/24  0533   CALCIUM mg/dL 8.8 9.0  --  8.6 7.9*   ALBUMIN g/dL  --   --   --  3.3* 3.5   MAGNESIUM mg/dL  --   --  2.0  --   --    PHOSPHORUS mg/dL  --   --  3.0  --   --      Results from last 7 days   Lab Units 04/14/24  0545 04/11/24  1938 04/11/24  1800   PROCALCITONIN ng/mL 0.05  --  0.06   LACTATE mmol/L  --  0.9  --      COVID19   Date Value Ref Range Status   04/11/2024 Not Detected Not Detected - Ref. Range Final   03/31/2024 Not Detected Not Detected - Ref. Range Final     No results found for: \"HGBA1C\", \"POCGLU\"        XR Chest 1 View  Narrative: XR CHEST 1 VW-4/14/2024     HISTORY: Shortness of breath.     Heart size is at the upper limits of normal. There is bilateral  interstitial and alveolar fluid consistent with pulmonary edema. There  could be an element of pneumonia in the lung bases. Bilateral pleural  effusions are seen. Sternotomy wires are seen. Left upper extremity PICC  line is seen in good position.     No pneumothorax is seen.     Impression: 1. FINDINGS consistent with bilateral pulmonary edema and pleural  effusions. These findings may have progressed slightly since the  4/11/2024 study.        This report was finalized on 4/14/2024 2:22 PM by Dr. Donis Guzmán M.D on Workstation: [id number]       Scheduled Medications  ampicillin, 2 g, Intravenous, Q4H  [Held by provider] apixaban, 5 mg, Oral, Q12H  atorvastatin, 40 mg, Oral, Daily  budesonide, 0.5 mg, Nebulization, BID - RT  bumetanide, 2 mg, Intravenous, Q8H  cefTRIAXone (ROCEPHIN) 2,000 mg in sodium chloride 0.9 % 100 mL MBP, 2,000 mg, Intravenous, Q12H  cetirizine, 10 mg, Oral, Daily  cyclobenzaprine, 10 mg, Oral, " Nightly  dilTIAZem, 60 mg, Oral, Q6H  methylPREDNISolone sodium succinate, 40 mg, Intravenous, Q12H  multivitamin with minerals, 1 tablet, Oral, Daily  pantoprazole, 40 mg, Intravenous, Q12H  roflumilast, 500 mcg, Oral, Daily  rOPINIRole, 2 mg, Oral, Nightly  sertraline, 100 mg, Oral, Daily  sodium chloride, 10 mL, Intravenous, Q12H  sodium chloride, 10 mL, Intravenous, Q12H  spironolactone, 25 mg, Oral, Daily    Infusions   Diet  Diet: Cardiac; Healthy Heart (2-3 Na+); Fluid Consistency: Thin (IDDSI 0)    I have personally reviewed     [x]  Laboratory   [x]  Microbiology   [x]  Radiology   [x]  EKG/Telemetry  []  Cardiology/Vascular   []  Pathology    []  Records       Assessment/Plan     Active Hospital Problems    Diagnosis  POA    **Acute on chronic diastolic congestive heart failure [I50.33]  Yes    COPD (chronic obstructive pulmonary disease) [J44.9]  Unknown    Septicemia due to enterococcus [A41.81]  Yes    Pneumonia [J18.9]  Yes    Iron deficiency anemia [D50.9]  Yes    Chronic hypoxemic respiratory failure [J96.11]  Yes    PAF (paroxysmal atrial fibrillation) [I48.0]  Yes      Resolved Hospital Problems   No resolved problems to display.       68 y.o. female with COPD, chronic hypoxic respiratory failure on 4 l nasal cannula, chronic diastolic CHF and A-fib with recent hospitalization for enterococcal bacteremia discharged with IV antibiotics now admitted with worsening dyspnea      Active chronic heart failure with preserved ejection fraction  History of MV replacement  -Echocardiogram 04/4/24 showed EF of 61 to 65%  -On IV Bumex, cardiology managing  -  COPD/chronic hypoxic respiratory failure on 4 L nasal cannula at baseline  -On IV steroids, budesonide nebulizer  -Roflumilast  -Pulmonology managing        A-fib with RVR  -On diltiazem, apixaban on hold due to melena    Enterococcus septicemia  -Diagnosed during recent hospitalization from 4/2/2024 - 4/5/2024.    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       Melena/acute on chronic anemia  -Hemoglobin dropped to 6.9 on 04/12/2024, status post 1 unit of PRBC transfused  -Eliquis on hold  -Continue PPI  -Hemoglobin 8.3, down from 8.7 yesterday.  Repeat H&H this afternoon.        SCDs for DVT prophylaxis.  Full code.  Discussed with patient, family, and care team on multidisciplinary rounds.  Anticipate discharge home, timing yet to be determined      Copied text in this note has been reviewed and is accurate as of 04/15/24.         Dictated utilizing Dragon dictation        Arina Castaneda MD  [facility]  04/15/24  16:52 EDT

## 2024-04-15 NOTE — PLAN OF CARE
Goal Outcome Evaluation:  Plan of Care Reviewed With: patient           Outcome Evaluation: C/o SOA x 2 episodes and prn breathing txs given.02 4L NC. IV Lasix changed to IV Bumex. Potassium replaced. IV Ampicillin and IV Rocephin given. Left dual lumen PICC and dressing intact. Medicated for back pain and nausea x 1. Telemetry a-fib w/ occ isolated PVC's.

## 2024-04-15 NOTE — CASE MANAGEMENT/SOCIAL WORK
Continued Stay Note  [facility]     Patient Name: Paz Browne  MRN: [id number]  Today's Date: 4/15/2024    Admit Date: 4/11/2024    Plan: Plan home with family.  CRUZ Ashley RN   Discharge Plan       Row Name 04/15/24 1419       Plan    Plan Plan home with family.  CRUZ Ashley RN    Patient/Family in Agreement with Plan yes    Plan Comments Spoke with pt and pt's spouse ( [place]) at bedside.  Pt denies any discharge needs.  Pt's spouse and brother in law ([place]) will assist pt at home if needed.  Pt's spouse will transport pt home.  Plan home with family.  CRUZ Ashley RN                   Discharge Codes    No documentation.                 Expected Discharge Date and Time       Expected Discharge Date Expected Discharge Time    Apr 18, 2024               Amada Ashley RN

## 2024-04-15 NOTE — PLAN OF CARE
"Goal Outcome Evaluation:     Patient resting abed with her  at the bedside whom is helpful and attentive to her needs. She is alert, oriented, and pleasant but becomes anxious often and states \"I'm just scared\", \"I've been sick a long time and I can't breathe\"... Patient continues on IV antibiotics as ordered, VSS,  and she is tolerating the meds without any adverse side effects noted.  She also started IV steroids to help her with her lung function, and is still receiving IV diuretic for her issue with hypervolemia. Nursing will continue to monitor for improvement in her condition.                                           "

## 2024-04-15 NOTE — PLAN OF CARE
Goal Outcome Evaluation:  Plan of Care Reviewed With: patient, spouse        Progress: improving  Outcome Evaluation: pt agreeable to OT/PT session, demo's continued deficits with act lai and strength requiring cues for pacing, energy conservation and pursed lip breathing methods to reduce SOA. Pt. was able to transfer to EOB and progress into standing w RW. Pt. completed func mob in room before returning to [facility]. Pt. was provided BUE ther ex to address deficits in act lai and strength while also working on breathing techniques. OT to continue to follow and progress as able,      Anticipated Discharge Disposition (OT): home with assist

## 2024-04-15 NOTE — THERAPY TREATMENT NOTE
Patient Name: Paz Browne  : 1955    MRN: [id number]                              Today's Date: 4/15/2024       Admit Date: 2024    Visit Dx:     ICD-10-CM ICD-9-CM   1. Sepsis, due to unspecified organism, unspecified whether acute organ dysfunction present  A41.9 038.9     995.91   2. Acute on chronic congestive heart failure, unspecified heart failure type  I50.9 428.0   3. Atrial fibrillation with RVR  I48.91 427.31   4. History of atrial fibrillation  Z86.79 V12.59   5. Chronic anticoagulation  Z79.01 V58.61   6. Hypoxia  R09.02 799.02   7. History of COPD  Z87.09 V12.69   8. History of endocarditis  Z86.79 V12.59     Patient Active Problem List   Diagnosis    PAF (paroxysmal atrial fibrillation)    Hypertension    Hyperlipidemia    Pain medication agreement    Hiatal hernia    Primary osteoarthritis involving multiple joints    Pulmonary hypertension    S/P TVR (tricuspid valve repair)    Pulmonary nodule    Restless leg syndrome    Chronic low back pain    Dysplastic polyp of colon    History of mitral valve replacement with tissue graft    Chronic hypoxemic respiratory failure    Anemia    Celiac artery stenosis    Intertrigo    Abnormal EKG    Muscle spasms of both lower extremities    Iron deficiency anemia    Adenomatous polyp of colon    Gastroesophageal reflux disease without esophagitis    Headache    History of endocarditis    Pneumonia of both lower lobes due to infectious organism    Peptic ulcer disease    Peripheral vascular disease    Hyperlipidemia    Hyperglycemia    COPD with acute exacerbation    Chronic diastolic CHF (congestive heart failure)    Chronic bilateral low back pain    COPD exacerbation    Leukocytosis    Elevated troponin    Pneumonia    Acute-on-chronic respiratory failure    Septicemia due to enterococcus    Acute on chronic diastolic congestive heart failure    Sepsis    COPD (chronic obstructive pulmonary disease)     Past Medical History:   Diagnosis Date     VAN (acute kidney injury)     Anemia     Asthma     Atrial flutter     cardioversion    Cataract     Celiac artery stenosis     Chronic respiratory failure with hypoxia     Colon polyp     COPD (chronic obstructive pulmonary disease)     GI bleed     Hiatal hernia     History of CHF (congestive heart failure)     due to MR    History of home oxygen therapy     3 lpm NC    History of mitral valve replacement with tissue graft     Hyperlipidemia     Hypertension     Infectious viral hepatitis     B    Intertrigo     Long term (current) use of anticoagulants     Mitral regurgitation     s/p tissue MVR    PAF (paroxysmal atrial fibrillation)     s/p MAZE    Pneumonia     Pulmonary hypertension     S/P TVR (tricuspid valve repair) 7/7/2016     Past Surgical History:   Procedure Laterality Date    CARDIAC CATHETERIZATION  09/01/2014    Right dominant systemt, normal coronary arteries.     CARDIAC CATHETERIZATION Left 6/10/2016    Procedure: Cardiac catheterization;  Surgeon: Sergei Hall MD;  Location: [facility] CATH INVASIVE LOCATION;  Service:     CARDIAC CATHETERIZATION N/A 6/10/2016    Procedure: Right Heart Cath;  Surgeon: Sergei Hall MD;  [facility];  Service:     CATARACT EXTRACTION      COLONOSCOPY      COLONOSCOPY N/A 8/4/2017    Procedure: COLONOSCOPY TO CECUM/TI WITH POLYPECTOMY ( COLD BX);  Surgeon: Cleveland Devine MD;  Location: [facility] ENDOSCOPY;  Service:     COLONOSCOPY N/A 8/10/2017    Procedure: COLONOSCOPY to cecum and TI with 2 clips placed at transverse;  Surgeon: Earnest PALOMO MD;  Location: [facility] ENDOSCOPY;  Service:     COLONOSCOPY N/A 12/22/2017    Procedure: COLONOSCOPY INTO CECUM WITH COLD POLYPECTOMIES;  Surgeon: Cleveland Devine MD;  Location: [facility] ENDOSCOPY;  Service:     COLONOSCOPY N/A 5/17/2021    Procedure: COLONOSCOPY to cecum;  Surgeon: Cleveland Devine MD;  Location: [facility] ENDOSCOPY;  Service: Gastroenterology;  Laterality: N/A;  Pre: Fe deficency anemia, h/x  of polyps  Post: fair prep, normal    ENDOSCOPY N/A 8/17/2017    Procedure: ESOPHAGOGASTRODUODENOSCOPY;  Surgeon: Porsha Ruby MD;  Location: [facility] ENDOSCOPY;  Service:     ENDOSCOPY N/A 12/22/2017    Procedure: ESOPHAGOGASTRODUODENOSCOPY WITH BIOPSIES;  Surgeon: Cleveland Devine MD;  Location: [facility] ENDOSCOPY;  Service:     ENDOSCOPY N/A 5/17/2021    Procedure: ESOPHAGOGASTRODUODENOSCOPY  with biopsies;  Surgeon: Cleveland Devine MD;  Location: [facility] ENDOSCOPY;  Service: Gastroenterology;  Laterality: N/A;  Pre: Fe deficency anemia, nausea, heme positive stool   Post: gastritis, sloughing of distal esophagus mucosa    GALLBLADDER SURGERY      HEMORRHOIDECTOMY      HYSTERECTOMY      KIDNEY SURGERY  04/22/2013    Stent placement    MAZE PROCEDURE      MITRAL VALVE REPLACEMENT      TONSILLECTOMY      TRICUSPID VALVE REPLACEMENT        General Information       Row Name 04/15/24 1219          Physical Therapy Time and Intention    Document Type therapy note (daily note)  -     Mode of Treatment co-treatment;physical therapy;occupational therapy  -       Row Name 04/15/24 1219          General Information    Patient Profile Reviewed yes  -     Existing Precautions/Restrictions fall;oxygen therapy device and L/min  -       Row Name 04/15/24 1219          Living Environment    People in Home spouse  -       Row Name 04/15/24 1219          Cognition    Orientation Status (Cognition) oriented x 3  -       Row Name 04/15/24 1219          Safety Issues, Functional Mobility    Safety Issues Affecting Function (Mobility) impulsivity;insight into deficits/self-awareness;judgment;problem-solving;safety precaution awareness  -     Impairments Affecting Function (Mobility) balance;endurance/activity tolerance;shortness of breath  -     Comment, Safety Issues/Impairments (Mobility) PT/OT cotreatment appropriate due to pt acuity level and to maxmize therapeutic benefit and for safety of patient and staff  -                User Key  (r) = Recorded By, (t) = Taken By, (c) = Cosigned By      Initials Name Provider Type    Trish Hernandez PTA Physical Therapist Assistant                   Mobility       Row Name 04/15/24 1220          Bed Mobility    Supine-Sit Canadian (Bed Mobility) contact guard;verbal cues  -     Assistive Device (Bed Mobility) bed rails;head of bed elevated  -     Comment, (Bed Mobility) cues for safe hand placement  -       Row Name 04/15/24 1220          Sit-Stand Transfer    Sit-Stand Canadian (Transfers) 2 person assist;contact guard;1 person to manage equipment  -     Assistive Device (Sit-Stand Transfers) walker, front-wheeled  -JM       Row Name 04/15/24 1220          Gait/Stairs (Locomotion)    Canadian Level (Gait) 2 person assist;contact guard;1 person to manage equipment  -     Assistive Device (Gait) walker, front-wheeled  -JM     Distance in Feet (Gait) 20  -JM     Deviations/Abnormal Patterns (Gait) base of support, narrow  -JM     Bilateral Gait Deviations forward flexed posture  -     Comment, (Gait/Stairs) cues to slow pace for safety, attempted to sit before at chair, incr SOA, some assist to guide rwx in room  -               User Key  (r) = Recorded By, (t) = Taken By, (c) = Cosigned By      Initials Name Provider Type    Trish Hernandez PTA Physical Therapist Assistant                   Obj/Interventions       Row Name 04/15/24 1228          Motor Skills    Therapeutic Exercise --  printed HEP so pt and husb could perf ad bob, and pace her activity as tolerated  -               User Key  (r) = Recorded By, (t) = Taken By, (c) = Cosigned By      Initials Name Provider Type    Trish Hernandez PTA Physical Therapist Assistant                   Goals/Plan    No documentation.                  Clinical Impression       Row Name 04/15/24 1228          Pain    Pretreatment Pain Rating 0/10 - no pain  -     Posttreatment Pain Rating 0/10 - no pain   -       Row Name 04/15/24 1228          Plan of Care Review    Plan of Care Reviewed With patient;spouse  -     Progress improving  -     Outcome Evaluation Pt agreed to PT/OT session, pt lai bed mobility with assist of 1, pt educ to slow pace due to impulsivity and incr SOA w/activity; pt amb ~20ft w/rwx assist of 2 due to need for equipment assist and safety, pt plans home w/HH when medically ready, spouse to assist, issued HEP for UE/LE strengthening and stretching exer for pt and husb to perf ad bob as oxygen needs allow  -[facility] Name 04/15/24 1228          Therapy Assessment/Plan (PT)    Rehab Potential (PT) good, to achieve stated therapy goals  -     Criteria for Skilled Interventions Met (PT) yes  -[facility] Name 04/15/24 1228          Vital Signs    Pre SpO2 (%) 96  -     O2 Delivery Pre Treatment supplemental O2  -     Intra SpO2 (%) 83  -     O2 Delivery Intra Treatment supplemental O2  -     Post SpO2 (%) 89  -     O2 Delivery Post Treatment supplemental O2  -[facility] Name 04/15/24 1228          Positioning and Restraints    Pre-Treatment Position in bed  -     Post Treatment Position chair  -     In Chair sitting;call light within reach;encouraged to call for assist;exit alarm on;with family/caregiver;notified nsg  placed feet on pull-out footstool  -               User Key  (r) = Recorded By, (t) = Taken By, (c) = Cosigned By      Initials Name Provider Type    Trish Hernandez PTA Physical Therapist Assistant                   Outcome Measures       Row Name 04/15/24 1251          How much help from another person do you currently need...    Turning from your back to your side while in flat bed without using bedrails? 4  -JM     Moving from lying on back to sitting on the side of a flat bed without bedrails? 3  -JM     Moving to and from a bed to a chair (including a wheelchair)? 3  -JM     Standing up from a chair using your arms (e.g., wheelchair, bedside  chair)? 3  -JM     Climbing 3-5 steps with a railing? 1  -JM     To walk in hospital room? 3  -JM     AM-PAC 6 Clicks Score (PT) 17  -     Highest Level of Mobility Goal 5 --> Static standing  -               User Key  (r) = Recorded By, (t) = Taken By, (c) = Cosigned By      Initials Name Provider Type    Trish Hernandez, AYUSH Physical Therapist Assistant                                 Physical Therapy Education       Title: PT OT SLP Therapies (In Progress)       Topic: Physical Therapy (Done)       Point: Mobility training (Done)       Learning Progress Summary             Patient Eager, E,TB,D, VU,NR by  at 4/15/2024 1251    Acceptance, E,D, VU,NR by MS at 4/14/2024 1349    Acceptance, E,D, NR by  at 4/12/2024 1421   Family Eager, E,TB,D, VU,NR by  at 4/15/2024 1251                         Point: Home exercise program (Done)       Learning Progress Summary             Patient Eager, E,TB,D, VU,NR by  at 4/15/2024 1251    Acceptance, E,D, VU,NR by MS at 4/14/2024 1349   Family Eager, E,TB,D, VU,NR by  at 4/15/2024 1251                         Point: Body mechanics (Done)       Learning Progress Summary             Patient Eager, E,TB,D, VU,NR by  at 4/15/2024 1251    Acceptance, E,D, VU,NR by MS at 4/14/2024 1349   Family Eager, E,TB,D, VU,NR by  at 4/15/2024 1251                         Point: Precautions (Done)       Learning Progress Summary             Patient Eager, E,TB,D, VU,NR by  at 4/15/2024 1251    Acceptance, E,D, VU,NR by MS at 4/14/2024 1349   Family Eager, E,TB,D, VU,NR by  at 4/15/2024 1251                                         User Key       Initials Effective Dates Name Provider Type Discipline     06/16/21 -  Brianna Dejesus, PT Physical Therapist PT     03/07/18 -  Trish Newby PTA Physical Therapist Assistant PT    MS 06/16/21 -  Jose Luis Latif, PT Physical Therapist PT                  PT Recommendation and Plan     Plan of Care Reviewed With: patient,  spouse  Progress: improving  Outcome Evaluation: Pt agreed to PT/OT session, pt lai bed mobility with assist of 1, pt educ to slow pace due to impulsivity and incr SOA w/activity; pt amb ~20ft w/rwx assist of 2 due to need for equipment assist and safety, pt plans home w/HH when medically ready, spouse to assist, issued HEP for UE/LE strengthening and stretching exer for pt and husb to perf ad bob as oxygen needs allow     Time Calculation:         PT Charges       Row Name 04/15/24 1253             Time Calculation    Start Time 1052  -      Stop Time 1130  -      Time Calculation (min) 38 min  -      PT Received On 04/15/24  -MANAS      PT - Next Appointment 04/16/24  -MANAS                User Key  (r) = Recorded By, (t) = Taken By, (c) = Cosigned By      Initials Name Provider Type    Trish Hernandez PTA Physical Therapist Assistant                  Therapy Charges for Today       Code Description Service Date Service Provider Modifiers Qty    08030664957 HC PT THERAPEUTIC ACT EA 15 MIN 4/15/2024 Trish Newby PTA GP 3    38915637319 HC PT THER SUPP EA 15 MIN 4/15/2024 Trish Newby PTA GP 2            PT G-Codes  Outcome Measure Options: AM-PAC 6 Clicks Basic Mobility (PT)  AM-PAC 6 Clicks Score (PT): 17  AM-PAC 6 Clicks Score (OT): 16  PT Discharge Summary  Anticipated Discharge Disposition (PT): home with assist, home with home health    Trish Newby PTA  4/15/2024

## 2024-04-15 NOTE — PROGRESS NOTES
LOS: 4 days     Chief Complaint:  concern for sepsis     Interval History: No fever.  WBC normal.  She remains very short of air especially with exertion.  She is tolerating ampicillin and ceftriaxone without rash.  Discussed with her  at the bedside.    Meds:    Current Facility-Administered Medications:     acetaminophen (TYLENOL) tablet 650 mg, 650 mg, Oral, Q4H PRN, Susanne Bello MD    albuterol (PROVENTIL) nebulizer solution 0.083% 2.5 mg/3mL, 2.5 mg, Nebulization, Q6H PRN, Susanne Bello MD, 2.5 mg at 04/15/24 0844    ampicillin 2000 mg IVPB in 100 mL NS (MBP), 2 g, Intravenous, Q4H, Hanna Narvaez APRN, Last Rate: 200 mL/hr at 04/15/24 0818, 2 g at 04/15/24 0818    [Held by provider] apixaban (ELIQUIS) tablet 5 mg, 5 mg, Oral, Q12H, Susanne Bello MD, 5 mg at 04/11/24 2351    atorvastatin (LIPITOR) tablet 40 mg, 40 mg, Oral, Daily, Susanne Bello MD, 40 mg at 04/15/24 0827    sennosides-docusate (PERICOLACE) 8.6-50 MG per tablet 2 tablet, 2 tablet, Oral, BID PRN **AND** polyethylene glycol (MIRALAX) packet 17 g, 17 g, Oral, Daily PRN **AND** bisacodyl (DULCOLAX) EC tablet 5 mg, 5 mg, Oral, Daily PRN **AND** bisacodyl (DULCOLAX) suppository 10 mg, 10 mg, Rectal, Daily PRN, Susanne Bello MD    budesonide (PULMICORT) nebulizer solution 0.5 mg, 0.5 mg, Nebulization, BID - RT, Susanne Bello MD, 0.5 mg at 04/15/24 0847    Calcium Replacement - Follow Nurse / BPA Driven Protocol, , Does not apply, PRN, Susanne Bello MD    cefTRIAXone (ROCEPHIN) 2,000 mg in sodium chloride 0.9 % 100 mL MBP, 2,000 mg, Intravenous, Q12H, Susanne Bello MD, Last Rate: 200 mL/hr at 04/15/24 0817, 2,000 mg at 04/15/24 0817    cetirizine (zyrTEC) tablet 10 mg, 10 mg, Oral, Daily, Susanne Bello MD, 10 mg at 04/15/24 0826    cyclobenzaprine (FLEXERIL) tablet 10 mg, 10 mg, Oral, Nightly, Susanne Bello MD, 10 mg at 04/14/24 2013    dilTIAZem  (CARDIZEM) tablet 60 mg, 60 mg, Oral, Q6H, Jeremiah Saab MD, 60 mg at 04/15/24 0737    diphenhydrAMINE (BENADRYL) capsule 25 mg, 25 mg, Oral, Once PRN, Ronald Marie MD    furosemide (LASIX) injection 60 mg, 60 mg, Intravenous, Q12H, Jeremiah Saab MD, 60 mg at 04/15/24 0737    HYDROcodone-acetaminophen (NORCO) 7.5-325 MG per tablet 1 tablet, 1 tablet, Oral, Q6H PRN, Susanne Bello MD, 1 tablet at 04/15/24 0310    ipratropium-albuterol (DUO-NEB) nebulizer solution 3 mL, 3 mL, Nebulization, Q4H PRN, Susanne Bello MD, 3 mL at 04/14/24 1348    Magnesium Standard Dose Replacement - Follow Nurse / BPA Driven Protocol, , Does not apply, PRN, Susanne Bello MD    melatonin tablet 3 mg, 3 mg, Oral, Nightly PRN, Susanne Bello MD, 3 mg at 04/14/24 2020    methylPREDNISolone sodium succinate (SOLU-Medrol) injection 40 mg, 40 mg, Intravenous, Q12H, Jose Luis Conteh MD, 40 mg at 04/15/24 0310    multivitamin with minerals 1 tablet, 1 tablet, Oral, Daily, Susanne Bello MD, 1 tablet at 04/15/24 0827    ondansetron ODT (ZOFRAN-ODT) disintegrating tablet 4 mg, 4 mg, Oral, Q6H PRN, 4 mg at 04/12/24 1752 **OR** ondansetron (ZOFRAN) injection 4 mg, 4 mg, Intravenous, Q6H PRN, Susanne Bello MD, 4 mg at 04/14/24 1054    pantoprazole (PROTONIX) injection 40 mg, 40 mg, Intravenous, Q12H, Jose Luis Conteh MD, 40 mg at 04/15/24 0818    Phosphorus Replacement - Follow Nurse / BPA Driven Protocol, , Does not apply, PRN, Susanne Bello MD    potassium chloride (K-DUR,KLOR-CON) ER tablet 40 mEq, 40 mEq, Oral, Q4H, Arina Castaneda MD, 40 mEq at 04/15/24 0831    Potassium Replacement - Follow Nurse / BPA Driven Protocol, , Does not apply, PREugenia DONATO Renate Andrea, MD    roflumilast (DALIRESP) tablet 500 mcg, 500 mcg, Oral, Daily, Susanne Bello MD, 500 mcg at 04/15/24 0827    rOPINIRole (REQUIP) tablet 2 mg, 2 mg, Oral, Nightly,  Susanne Bello MD, 2 mg at 04/14/24 2013    sertraline (ZOLOFT) tablet 100 mg, 100 mg, Oral, Daily, Susanne Bello MD, 100 mg at 04/15/24 0827    sodium chloride 0.9 % flush 10 mL, 10 mL, Intravenous, Q12H, Jose Luis Conteh MD, 10 mL at 04/15/24 0827    sodium chloride 0.9 % flush 10 mL, 10 mL, Intravenous, Q12H, Jose Luis Conteh MD, 10 mL at 04/15/24 0827    sodium chloride 0.9 % flush 10 mL, 10 mL, Intravenous, PRN, Jose Luis Conteh MD    sodium chloride 0.9 % flush 20 mL, 20 mL, Intravenous, PRN, Jose Luis Conteh MD    sodium chloride 0.9 % infusion 40 mL, 40 mL, Intravenous, PRN, Jose Luis Conteh MD    sodium chloride nasal spray 2 spray, 2 spray, Each Nare, LILA, Jose Luis Conteh MD    spironolactone (ALDACTONE) tablet 25 mg, 25 mg, Oral, Daily, Jeremiah Saab MD, 25 mg at 04/15/24 0827        Vital Signs  Temp:  [97.7 °F (36.5 °C)-98.6 °F (37 °C)] 98.4 °F (36.9 °C)  Heart Rate:  [] 110  Resp:  [18-20] 20  BP: (122-141)/(56-71) 132/56    Physical Exam:  General: Tachypneic, very nice  Cardiovascular: Irregularly irregular, tachycardic  Respiratory: Coarse breath sounds bilaterally  GI: Soft, not tender  : No Vargas catheter present  Skin: No rashes   Vascular: LUE PICC w/o erythema    Results Review:    CBC, BMP, and blood cultures reviewed today  Lab Results   Component Value Date    WBC 8.48 04/15/2024    HGB 8.3 (L) 04/15/2024    HCT 26.6 (L) 04/15/2024    MCV 86.6 04/15/2024     04/15/2024     Lab Results   Component Value Date    GLUCOSE 128 (H) 04/15/2024    BUN 9 04/15/2024    CREATININE 0.68 04/15/2024    EGFRIFNONA 61 08/16/2021    EGFRIFAFRI 86 07/12/2021    BCR 13.2 04/15/2024    CO2 38.0 (H) 04/15/2024    CALCIUM 8.8 04/15/2024    ALBUMIN 3.3 (L) 04/13/2024    AST 23 04/13/2024    ALT 15 04/13/2024     Procalcitonin 0.06    Microbiology:  3/31 RPP: Negative  3/31 BCx: Enterococcus faecalis (not VRE) in  "2/2 sets (susceptible to ampicillin)  4/1 MRSA nares PCR: Negative  4/2 blood cultures: Negative to date  4/11 RPP: Negative  4/11 BCx: Negative to date  4/12 respiratory culture: \"Rejected\"    New radiology:  4/12 CT of the chest with small bilateral pleural effusions.  Bibasilar opacification right greater than left with some nodular component concerning for multifocal pneumonia possibly aspiration.  Thickening of distal esophagus suggesting esophagitis.  Severe emphysema.    4/14 CXR personally reviewed and shows pleural effusions and findings consistent with pulmonary edema.    Assessment & Plan   Shortness of breath due to pulmonary edema  Atrial fibrillation with rapid ventricular response  Enterococcus septicemia  Mitral valve replaced  History of tricuspid valve repair  COPD   Pulmonary hypertension    She is afebrile with a normal WBC.  Her blood cultures this admission are negative to date.  She remains short of air especially with exertion.  Her procalcitonin has been very low multiple times.  I do not think she has a new infection.  I recommend that she continue her previous antibiotic plan of ampicillin 2 g IV every 4 hours and ceftriaxone 2 g IV every 12 hours for 6 weeks course with stop date 5/13/2024.    Check CBC and BMP in the a.m. for close monitoring while on broad-spectrum antibiotics.    ID will follow.  "

## 2024-04-15 NOTE — PROGRESS NOTES
[facility]  Inpatient Progress Note    Reason for Follow Up: Melena    Subjective     Interval History:     We are following this patient due to concerns for melena in the setting of of Eliquis.  She also has acute on chronic respiratory failure on 4 L nasal cannula, sepsis and bacteremia with Enterococcus on IV antibiotics with a history of A-fib.  Eliquis on hold last dose 4/11. Previous endoscoyp >5 years ago per family and patient, hx of polyps.     Tentative EGD and colonoscopy pending respiratory status ? tomorrow    Hemoglobin this morning 8.3.    She is currently eating regular diet without issue.  Does report some nausea but no vomiting.  Denies abdominal pain.  She reports a dark-colored stool overnight.  No bright red blood per rectum.  She remains on 4 L nasal cannula.  She frequently has to stop and catch her breath during our conversation while sitting in a tripod position.      Current Facility-Administered Medications:     acetaminophen (TYLENOL) tablet 650 mg, 650 mg, Oral, Q4H PRN, Susanne Bello MD    albuterol (PROVENTIL) nebulizer solution 0.083% 2.5 mg/3mL, 2.5 mg, Nebulization, Q6H PRN, Susanne Bello MD, 2.5 mg at 04/15/24 0844    ampicillin 2000 mg IVPB in 100 mL NS (MBP), 2 g, Intravenous, Q4H, Hanna Narvaez APRN, Last Rate: 200 mL/hr at 04/15/24 0818, 2 g at 04/15/24 0818    [Held by provider] apixaban (ELIQUIS) tablet 5 mg, 5 mg, Oral, Q12H, Susanne Bello MD, 5 mg at 04/11/24 2351    atorvastatin (LIPITOR) tablet 40 mg, 40 mg, Oral, Daily, Susanne Bello MD, 40 mg at 04/15/24 0827    sennosides-docusate (PERICOLACE) 8.6-50 MG per tablet 2 tablet, 2 tablet, Oral, BID PRN **AND** polyethylene glycol (MIRALAX) packet 17 g, 17 g, Oral, Daily PRN **AND** bisacodyl (DULCOLAX) EC tablet 5 mg, 5 mg, Oral, Daily PRN **AND** bisacodyl (DULCOLAX) suppository 10 mg, 10 mg, Rectal, Daily PRN, Susanne Bello MD    budesonide  (PULMICORT) nebulizer solution 0.5 mg, 0.5 mg, Nebulization, BID - RT, Susanne Bello MD, 0.5 mg at 04/15/24 0847    Calcium Replacement - Follow Nurse / BPA Driven Protocol, , Does not apply, PRNEugenia Renate Andrea, MD    cefTRIAXone (ROCEPHIN) 2,000 mg in sodium chloride 0.9 % 100 mL MBP, 2,000 mg, Intravenous, Q12H, Susanne Bello MD, Last Rate: 200 mL/hr at 04/15/24 0817, 2,000 mg at 04/15/24 0817    cetirizine (zyrTEC) tablet 10 mg, 10 mg, Oral, Daily, Susanne Bello MD, 10 mg at 04/15/24 0826    cyclobenzaprine (FLEXERIL) tablet 10 mg, 10 mg, Oral, Nightly, Susanne Bello MD, 10 mg at 04/14/24 2013    dilTIAZem (CARDIZEM) tablet 60 mg, 60 mg, Oral, Q6H, Jeremiah Saab MD, 60 mg at 04/15/24 0737    diphenhydrAMINE (BENADRYL) capsule 25 mg, 25 mg, Oral, Once PRN, Ronald Marie MD    furosemide (LASIX) injection 60 mg, 60 mg, Intravenous, Q12H, Jeremiah Saab MD, 60 mg at 04/15/24 0737    HYDROcodone-acetaminophen (NORCO) 7.5-325 MG per tablet 1 tablet, 1 tablet, Oral, Q6H PRN, Susanne Bello MD, 1 tablet at 04/15/24 0310    ipratropium-albuterol (DUO-NEB) nebulizer solution 3 mL, 3 mL, Nebulization, Q4H PRN, Susanne Bello MD, 3 mL at 04/14/24 1348    Magnesium Standard Dose Replacement - Follow Nurse / BPA Driven Protocol, , Does not apply, PRN, Susanne Bello MD    melatonin tablet 3 mg, 3 mg, Oral, Nightly PRN, Susanne Bello MD, 3 mg at 04/14/24 2020    methylPREDNISolone sodium succinate (SOLU-Medrol) injection 40 mg, 40 mg, Intravenous, Q12H, Jose Luis Conteh MD, 40 mg at 04/15/24 0310    multivitamin with minerals 1 tablet, 1 tablet, Oral, Daily, Susanne Bello MD, 1 tablet at 04/15/24 0827    ondansetron ODT (ZOFRAN-ODT) disintegrating tablet 4 mg, 4 mg, Oral, Q6H PRN, 4 mg at 04/12/24 1752 **OR** ondansetron (ZOFRAN) injection 4 mg, 4 mg, Intravenous, Q6H PRN, Susanne Bello MD, 4 mg at  04/15/24 1014    pantoprazole (PROTONIX) injection 40 mg, 40 mg, Intravenous, Q12H, Jose Luis Conteh MD, 40 mg at 04/15/24 0818    Phosphorus Replacement - Follow Nurse / BPA Driven Protocol, , Does not apply, Eugenia SHARP Renate Andrea, MD    potassium chloride (K-DUR,KLOR-CON) ER tablet 40 mEq, 40 mEq, Oral, Q4H, Arina Castaneda MD, 40 mEq at 04/15/24 0831    Potassium Replacement - Follow Nurse / BPA Driven Protocol, , Does not apply, Eugenia SHARP Renate Andrea, MD    roflumilast (DALIRESP) tablet 500 mcg, 500 mcg, Oral, Daily, Susanne Bello MD, 500 mcg at 04/15/24 0827    rOPINIRole (REQUIP) tablet 2 mg, 2 mg, Oral, Nightly, Susanne Bello MD, 2 mg at 04/14/24 2013    sertraline (ZOLOFT) tablet 100 mg, 100 mg, Oral, Daily, Susanne Bello MD, 100 mg at 04/15/24 0827    sodium chloride 0.9 % flush 10 mL, 10 mL, Intravenous, Q12H, Jose Luis Conteh MD, 10 mL at 04/15/24 0827    sodium chloride 0.9 % flush 10 mL, 10 mL, Intravenous, Q12H, Jose Luis Conteh MD, 10 mL at 04/15/24 0827    sodium chloride 0.9 % flush 10 mL, 10 mL, Intravenous, PRN, Jose Luis Conteh MD    sodium chloride 0.9 % flush 20 mL, 20 mL, Intravenous, PRN, Jose Luis Conteh MD    sodium chloride 0.9 % infusion 40 mL, 40 mL, Intravenous, PRYady DONATO Matthew Brett, MD    sodium chloride nasal spray 2 spray, 2 spray, Each Nare, PRTANMAY, Jose Luis Conteh MD    spironolactone (ALDACTONE) tablet 25 mg, 25 mg, Oral, Daily, Jeremiah Saab MD, 25 mg at 04/15/24 0827  Review of Systems:    All systems were reviewed and negative except for:  Gastrointestinal: positive for  nausea and dark stool    Objective     Vital Signs  Temp:  [97.7 °F (36.5 °C)-98.6 °F (37 °C)] 98.1 °F (36.7 °C)  Heart Rate:  [] 89  Resp:  [18-20] 18  BP: (105-141)/(56-75) 105/75  Body mass index is 23.93 kg/m².    Intake/Output Summary (Last 24 hours) at 4/15/2024 7470  Last data filed  at 4/15/2024 0936  Gross per 24 hour   Intake --   Output 1500 ml   Net -1500 ml     I/O this shift:  In: -   Out: 800 [Urine:800]     Physical Exam:   General: patient awake, alert and cooperative   Abdomen: soft, nontender, nondistended; normal bowel sounds      Results Review:     I reviewed the patient's new clinical results.    Results from last 7 days   Lab Units 04/15/24  0509 04/13/24  0550 04/12/24  0534   WBC 10*3/mm3 8.48 11.01* 14.36*   HEMOGLOBIN g/dL 8.3* 8.7* 6.9*   HEMATOCRIT % 26.6* 28.6* 22.4*   PLATELETS 10*3/mm3 206 248 384     Results from last 7 days   Lab Units 04/15/24  0509 04/14/24  0545 04/13/24  2047 04/13/24  0550 04/12/24  1049 04/12/24  0533   SODIUM mmol/L 142 142  --  146*  --  144   POTASSIUM mmol/L 3.4* 3.5 4.3 3.6   < > 3.6   CHLORIDE mmol/L 94* 95*  --  99  --  102   CO2 mmol/L 38.0* 34.3*  --  36.2*  --  33.4*   BUN mg/dL 9 8  --  9  --  8   CREATININE mg/dL 0.68 0.72  --  0.78  --  0.82   CALCIUM mg/dL 8.8 9.0  --  8.6  --  7.9*   BILIRUBIN mg/dL  --   --   --  0.5  --  0.5   ALK PHOS U/L  --   --   --  66  --  69   ALT (SGPT) U/L  --   --   --  15  --  17   AST (SGOT) U/L  --   --   --  23  --  24   GLUCOSE mg/dL 128* 114*  --  102*  --  126*    < > = values in this interval not displayed.         Lab Results   Lab Value Date/Time    LIPASE 30 06/09/2023 0445    LIPASE 46 08/13/2017 0449       Radiology:  XR Chest 1 View   Final Result   1. FINDINGS consistent with bilateral pulmonary edema and pleural   effusions. These findings may have progressed slightly since the   4/11/2024 study.           This report was finalized on 4/14/2024 2:22 PM by Dr. Donis Guzmán M.D on Workstation: [id number]          CT Chest Without Contrast Diagnostic   Final Result   1.  Small bilateral pleural effusions with bibasilar pulmonary   pacification, right greater than left. Some areas have a somewhat   nodular appearance. Findings are most suggestive of multifocal   pneumonia, possibly  aspiration, and correlation with patient history is   recommended to determine most appropriate etiology. Given the nodular   appearance, follow-up with chest CT in 6 to 8 weeks is recommended to   ensure resolution and exclude the possibility of malignancy. New   mediastinal adenopathy enlarged since three 224 can be followed at this   time as well.   2.  Thickening of distal esophagus suggestive of esophagitis in   appropriate context. Correlation with patient history is recommended   with follow-up endoscopy if clinically indicated.   3.  Severe emphysema.   4.  Other findings as above.               This report was finalized on 4/12/2024 5:56 PM by Dr. Papo De La Torre M.D   on Workstation: [id number]          XR Chest 1 View   Final Result   Findings impression:   Background interstitial thickening is present throughout the bilateral   lungs with many areas demonstrate a somewhat nodular appearance which   cannot be evaluated on plain film radiographs, similar that seen on   prior radiographs, and best seen on CT chest 3/2/2024. Please refer to   this dictation for further formation and follow-up recommendations..   There is worsening pulmonary pacification within the right lung with   probable small bilateral pleural effusions. Cardiac silhouette is mildly   enlarged. Constellation findings are suggestive of worsening pulmonary   edema and/or multifocal pneumonia in the appropriate clinical context.   Correlation with patient history is recommended with follow-up chest CT   if clinically indicated. No pneumothorax is seen. There are median   sternotomy wires. A left PICC tip terminates over the expected location   of the superior vena cava.               This report was finalized on 4/11/2024 7:03 PM by Dr. Papo De La Torre M.D   on Workstation: [id number]              Assessment & Plan     Active Hospital Problems    Diagnosis     **Acute on chronic diastolic congestive heart failure     COPD (chronic obstructive  pulmonary disease)     Septicemia due to enterococcus     Pneumonia     Iron deficiency anemia     Chronic hypoxemic respiratory failure     PAF (paroxysmal atrial fibrillation)        Assessment:  Melena  Anemia  Abnormal CT of the chest thickening of the distal esophagus noted  Personal history of colon polyps  Acute on chronic respiratory failure  A-fib -Eliquis on hold - last dose 4/11    Plan:  We will need pulmonary and cardiac clearance prior to endoscopic evaluation  Continue IV PPI twice daily  Diet as tolerated  Monitor H&H and transfuse as needed per primary team  Tentative EGD and colonoscopy pending clearance    I discussed the patients findings and my recommendations with patient, family, and Dr. Morales .    Flores Mays, APRN

## 2024-04-16 LAB
ALBUMIN SERPL-MCNC: 3.3 G/DL (ref 3.5–5.2)
ALBUMIN/GLOB SERPL: 1.4 G/DL
ALP SERPL-CCNC: 55 U/L (ref 39–117)
ALT SERPL W P-5'-P-CCNC: 11 U/L (ref 1–33)
ANION GAP SERPL CALCULATED.3IONS-SCNC: 9.3 MMOL/L (ref 5–15)
AST SERPL-CCNC: 16 U/L (ref 1–32)
BACTERIA SPEC AEROBE CULT: NORMAL
BACTERIA SPEC AEROBE CULT: NORMAL
BASOPHILS # BLD AUTO: 0.01 10*3/MM3 (ref 0–0.2)
BASOPHILS NFR BLD AUTO: 0.1 % (ref 0–1.5)
BILIRUB SERPL-MCNC: 0.4 MG/DL (ref 0–1.2)
BUN SERPL-MCNC: 12 MG/DL (ref 8–23)
BUN/CREAT SERPL: 16.4 (ref 7–25)
CALCIUM SPEC-SCNC: 8.6 MG/DL (ref 8.6–10.5)
CHLORIDE SERPL-SCNC: 94 MMOL/L (ref 98–107)
CO2 SERPL-SCNC: 40.7 MMOL/L (ref 22–29)
CREAT SERPL-MCNC: 0.73 MG/DL (ref 0.57–1)
DEPRECATED RDW RBC AUTO: 53.7 FL (ref 37–54)
EGFRCR SERPLBLD CKD-EPI 2021: 88.6 ML/MIN/1.73
EOSINOPHIL # BLD AUTO: 0 10*3/MM3 (ref 0–0.4)
EOSINOPHIL NFR BLD AUTO: 0 % (ref 0.3–6.2)
ERYTHROCYTE [DISTWIDTH] IN BLOOD BY AUTOMATED COUNT: 16.9 % (ref 12.3–15.4)
GLOBULIN UR ELPH-MCNC: 2.4 GM/DL
GLUCOSE SERPL-MCNC: 122 MG/DL (ref 65–99)
HCT VFR BLD AUTO: 27.3 % (ref 34–46.6)
HGB BLD-MCNC: 8.5 G/DL (ref 12–15.9)
IMM GRANULOCYTES # BLD AUTO: 0.05 10*3/MM3 (ref 0–0.05)
IMM GRANULOCYTES NFR BLD AUTO: 0.4 % (ref 0–0.5)
LYMPHOCYTES # BLD AUTO: 0.36 10*3/MM3 (ref 0.7–3.1)
LYMPHOCYTES NFR BLD AUTO: 2.8 % (ref 19.6–45.3)
MCH RBC QN AUTO: 27.6 PG (ref 26.6–33)
MCHC RBC AUTO-ENTMCNC: 31.1 G/DL (ref 31.5–35.7)
MCV RBC AUTO: 88.6 FL (ref 79–97)
MONOCYTES # BLD AUTO: 0.24 10*3/MM3 (ref 0.1–0.9)
MONOCYTES NFR BLD AUTO: 1.9 % (ref 5–12)
NEUTROPHILS NFR BLD AUTO: 12.08 10*3/MM3 (ref 1.7–7)
NEUTROPHILS NFR BLD AUTO: 94.8 % (ref 42.7–76)
NRBC BLD AUTO-RTO: 0 /100 WBC (ref 0–0.2)
PHOSPHATE SERPL-MCNC: 2.9 MG/DL (ref 2.5–4.5)
PLATELET # BLD AUTO: 223 10*3/MM3 (ref 140–450)
PMV BLD AUTO: 10.4 FL (ref 6–12)
POTASSIUM SERPL-SCNC: 3.3 MMOL/L (ref 3.5–5.2)
PROT SERPL-MCNC: 5.7 G/DL (ref 6–8.5)
RBC # BLD AUTO: 3.08 10*6/MM3 (ref 3.77–5.28)
SODIUM SERPL-SCNC: 144 MMOL/L (ref 136–145)
WBC NRBC COR # BLD AUTO: 12.74 10*3/MM3 (ref 3.4–10.8)

## 2024-04-16 PROCEDURE — 25010000002 AMPICILLIN PER 500 MG: Performed by: NURSE PRACTITIONER

## 2024-04-16 PROCEDURE — 94664 DEMO&/EVAL PT USE INHALER: CPT

## 2024-04-16 PROCEDURE — 25010000002 METHYLPREDNISOLONE PER 40 MG: Performed by: HOSPITALIST

## 2024-04-16 PROCEDURE — 84100 ASSAY OF PHOSPHORUS: CPT | Performed by: STUDENT IN AN ORGANIZED HEALTH CARE EDUCATION/TRAINING PROGRAM

## 2024-04-16 PROCEDURE — 99232 SBSQ HOSP IP/OBS MODERATE 35: CPT | Performed by: INTERNAL MEDICINE

## 2024-04-16 PROCEDURE — 25010000002 ONDANSETRON PER 1 MG: Performed by: INTERNAL MEDICINE

## 2024-04-16 PROCEDURE — 94799 UNLISTED PULMONARY SVC/PX: CPT

## 2024-04-16 PROCEDURE — 97530 THERAPEUTIC ACTIVITIES: CPT

## 2024-04-16 PROCEDURE — 85025 COMPLETE CBC W/AUTO DIFF WBC: CPT | Performed by: NURSE PRACTITIONER

## 2024-04-16 PROCEDURE — 94761 N-INVAS EAR/PLS OXIMETRY MLT: CPT

## 2024-04-16 PROCEDURE — 99232 SBSQ HOSP IP/OBS MODERATE 35: CPT | Performed by: NURSE PRACTITIONER

## 2024-04-16 PROCEDURE — 80053 COMPREHEN METABOLIC PANEL: CPT | Performed by: NURSE PRACTITIONER

## 2024-04-16 PROCEDURE — 94760 N-INVAS EAR/PLS OXIMETRY 1: CPT

## 2024-04-16 PROCEDURE — 63710000001 ONDANSETRON ODT 4 MG TABLET DISPERSIBLE: Performed by: INTERNAL MEDICINE

## 2024-04-16 PROCEDURE — 25010000002 BUMETANIDE PER 0.5 MG: Performed by: INTERNAL MEDICINE

## 2024-04-16 PROCEDURE — 25010000002 CEFTRIAXONE PER 250 MG: Performed by: INTERNAL MEDICINE

## 2024-04-16 RX ORDER — POTASSIUM CHLORIDE 750 MG/1
40 TABLET, FILM COATED, EXTENDED RELEASE ORAL EVERY 4 HOURS
Status: COMPLETED | OUTPATIENT
Start: 2024-04-16 | End: 2024-04-16

## 2024-04-16 RX ADMIN — METHYLPREDNISOLONE SODIUM SUCCINATE 40 MG: 40 INJECTION, POWDER, FOR SOLUTION INTRAMUSCULAR; INTRAVENOUS at 02:25

## 2024-04-16 RX ADMIN — Medication 10 ML: at 20:54

## 2024-04-16 RX ADMIN — ONDANSETRON 4 MG: 2 INJECTION INTRAMUSCULAR; INTRAVENOUS at 20:53

## 2024-04-16 RX ADMIN — PANTOPRAZOLE SODIUM 40 MG: 40 INJECTION, POWDER, FOR SOLUTION INTRAVENOUS at 09:04

## 2024-04-16 RX ADMIN — SERTRALINE 100 MG: 100 TABLET, FILM COATED ORAL at 09:04

## 2024-04-16 RX ADMIN — POTASSIUM CHLORIDE 40 MEQ: 750 TABLET, EXTENDED RELEASE ORAL at 09:04

## 2024-04-16 RX ADMIN — IPRATROPIUM BROMIDE AND ALBUTEROL SULFATE 3 ML: .5; 3 SOLUTION RESPIRATORY (INHALATION) at 05:20

## 2024-04-16 RX ADMIN — BUMETANIDE 2 MG: 0.25 INJECTION, SOLUTION INTRAMUSCULAR; INTRAVENOUS at 01:26

## 2024-04-16 RX ADMIN — AMPICILLIN SODIUM 2 G: 2 INJECTION, POWDER, FOR SOLUTION INTRAVENOUS at 00:11

## 2024-04-16 RX ADMIN — POTASSIUM CHLORIDE 40 MEQ: 750 TABLET, EXTENDED RELEASE ORAL at 12:19

## 2024-04-16 RX ADMIN — BUDESONIDE 0.5 MG: 0.5 INHALANT RESPIRATORY (INHALATION) at 18:56

## 2024-04-16 RX ADMIN — ROPINIROLE 2 MG: 2 TABLET, FILM COATED ORAL at 20:53

## 2024-04-16 RX ADMIN — AMPICILLIN SODIUM 2 G: 2 INJECTION, POWDER, FOR SOLUTION INTRAVENOUS at 20:53

## 2024-04-16 RX ADMIN — AMPICILLIN SODIUM 2 G: 2 INJECTION, POWDER, FOR SOLUTION INTRAVENOUS at 05:20

## 2024-04-16 RX ADMIN — HYDROCODONE BITARTRATE AND ACETAMINOPHEN 1 TABLET: 7.5; 325 TABLET ORAL at 20:52

## 2024-04-16 RX ADMIN — AMPICILLIN SODIUM 2 G: 2 INJECTION, POWDER, FOR SOLUTION INTRAVENOUS at 15:57

## 2024-04-16 RX ADMIN — CYCLOBENZAPRINE 10 MG: 10 TABLET, FILM COATED ORAL at 20:53

## 2024-04-16 RX ADMIN — CEFTRIAXONE 2000 MG: 2 INJECTION, POWDER, FOR SOLUTION INTRAMUSCULAR; INTRAVENOUS at 07:56

## 2024-04-16 RX ADMIN — DILTIAZEM HYDROCHLORIDE 60 MG: 60 TABLET, FILM COATED ORAL at 09:44

## 2024-04-16 RX ADMIN — PANTOPRAZOLE SODIUM 40 MG: 40 INJECTION, POWDER, FOR SOLUTION INTRAVENOUS at 20:53

## 2024-04-16 RX ADMIN — HYDROCODONE BITARTRATE AND ACETAMINOPHEN 1 TABLET: 7.5; 325 TABLET ORAL at 15:18

## 2024-04-16 RX ADMIN — DILTIAZEM HYDROCHLORIDE 60 MG: 60 TABLET, FILM COATED ORAL at 22:16

## 2024-04-16 RX ADMIN — DILTIAZEM HYDROCHLORIDE 60 MG: 60 TABLET, FILM COATED ORAL at 15:57

## 2024-04-16 RX ADMIN — DILTIAZEM HYDROCHLORIDE 60 MG: 60 TABLET, FILM COATED ORAL at 01:25

## 2024-04-16 RX ADMIN — BUDESONIDE 0.5 MG: 0.5 INHALANT RESPIRATORY (INHALATION) at 09:35

## 2024-04-16 RX ADMIN — Medication 1 TABLET: at 09:05

## 2024-04-16 RX ADMIN — CETIRIZINE HYDROCHLORIDE 10 MG: 10 TABLET ORAL at 09:04

## 2024-04-16 RX ADMIN — METHYLPREDNISOLONE SODIUM SUCCINATE 40 MG: 40 INJECTION, POWDER, FOR SOLUTION INTRAMUSCULAR; INTRAVENOUS at 15:57

## 2024-04-16 RX ADMIN — AMPICILLIN SODIUM 2 G: 2 INJECTION, POWDER, FOR SOLUTION INTRAVENOUS at 12:19

## 2024-04-16 RX ADMIN — HYDROCODONE BITARTRATE AND ACETAMINOPHEN 1 TABLET: 7.5; 325 TABLET ORAL at 09:20

## 2024-04-16 RX ADMIN — Medication 3 MG: at 20:53

## 2024-04-16 RX ADMIN — BUMETANIDE 2 MG: 0.25 INJECTION, SOLUTION INTRAMUSCULAR; INTRAVENOUS at 09:04

## 2024-04-16 RX ADMIN — ATORVASTATIN CALCIUM 40 MG: 20 TABLET, FILM COATED ORAL at 09:04

## 2024-04-16 RX ADMIN — CEFTRIAXONE 2000 MG: 2 INJECTION, POWDER, FOR SOLUTION INTRAMUSCULAR; INTRAVENOUS at 20:53

## 2024-04-16 RX ADMIN — ROFLUMILAST 500 MCG: 500 TABLET ORAL at 09:04

## 2024-04-16 RX ADMIN — ONDANSETRON 4 MG: 4 TABLET, ORALLY DISINTEGRATING ORAL at 09:20

## 2024-04-16 RX ADMIN — BUMETANIDE 2 MG: 0.25 INJECTION, SOLUTION INTRAMUSCULAR; INTRAVENOUS at 15:57

## 2024-04-16 RX ADMIN — AMPICILLIN SODIUM 2 G: 2 INJECTION, POWDER, FOR SOLUTION INTRAVENOUS at 09:04

## 2024-04-16 RX ADMIN — SPIRONOLACTONE 25 MG: 25 TABLET, FILM COATED ORAL at 09:04

## 2024-04-16 NOTE — PROGRESS NOTES
"   LOS: 5 days   Patient Care Team:  Javan Martinez MD as PCP - General (Internal Medicine)  Ag Melo Jr., MD as Consulting Physician (Pulmonary Disease)  Cleveland Devine MD as Consulting Physician (Gastroenterology)  Earnest Gan MD as Consulting Physician (Cardiology)  Daniela Shin MD PhD as Consulting Physician (Hematology and Oncology)    Chief Complaint: SOA     Interval History: Her SOA at rest is better, still very SOA with exertion, has had good UOP/more than before.       Objective   Vital Signs  Temp:  [97.5 °F (36.4 °C)-98.1 °F (36.7 °C)] 97.9 °F (36.6 °C)  Heart Rate:  [] 85  Resp:  [18] 18  BP: (104-143)/(51-62) 104/51    Intake/Output Summary (Last 24 hours) at 4/16/2024 1530  Last data filed at 4/16/2024 0540  Gross per 24 hour   Intake 240 ml   Output 1500 ml   Net -1260 ml       Last Weight and Admission Weight        04/16/24  0540   Weight: 68.3 kg (150 lb 9.2 oz)     Flowsheet Rows      Flowsheet Row First Filed Value   Admission Height 170.2 cm (67\") Documented at 04/11/2024 1752   Admission Weight 59 kg (130 lb) Documented at 04/11/2024 1752            Physical Exam  Constitutional:       Comments: SOA w speaking   HENT:      Head: Normocephalic.      Mouth/Throat:      Pharynx: Oropharynx is clear.   Eyes:      Conjunctiva/sclera: Conjunctivae normal.   Cardiovascular:      Rate and Rhythm: Normal rate. Rhythm irregular.   Pulmonary:      Comments: Poor air movement, very reduced at ases  Abdominal:      Palpations: Abdomen is soft.      Tenderness: There is no abdominal tenderness.   Musculoskeletal:         General: No swelling.   Neurological:      General: No focal deficit present.       Results Review:      Results from last 7 days   Lab Units 04/16/24  0648 04/15/24  1649 04/15/24  0509 04/14/24  0545   SODIUM mmol/L 144  --  142 142   POTASSIUM mmol/L 3.3* 3.8 3.4* 3.5   CHLORIDE mmol/L 94*  --  94* 95*   CO2 mmol/L 40.7*  --  38.0* 34.3*   BUN mg/dL 12  --  " 9 8   CREATININE mg/dL 0.73  --  0.68 0.72   GLUCOSE mg/dL 122*  --  128* 114*   CALCIUM mg/dL 8.6  --  8.8 9.0         Results from last 7 days   Lab Units 04/16/24  0648 04/15/24  1825 04/15/24  0509 04/13/24  0550   WBC 10*3/mm3 12.74*  --  8.48 11.01*   HEMOGLOBIN g/dL 8.5* 9.1* 8.3* 8.7*   HEMATOCRIT % 27.3* 29.2* 26.6* 28.6*   PLATELETS 10*3/mm3 223  --  206 248             Results from last 7 days   Lab Units 04/13/24  2047   MAGNESIUM mg/dL 2.0           I reviewed the patient's new clinical results.  I personally viewed and interpreted the patient's EKG/Telemetry data        Medication Review:   ampicillin, 2 g, Intravenous, Q4H  [Held by provider] apixaban, 5 mg, Oral, Q12H  atorvastatin, 40 mg, Oral, Daily  budesonide, 0.5 mg, Nebulization, BID - RT  bumetanide, 2 mg, Intravenous, Q8H  cefTRIAXone (ROCEPHIN) 2,000 mg in sodium chloride 0.9 % 100 mL MBP, 2,000 mg, Intravenous, Q12H  cetirizine, 10 mg, Oral, Daily  cyclobenzaprine, 10 mg, Oral, Nightly  dilTIAZem, 60 mg, Oral, Q6H  methylPREDNISolone sodium succinate, 40 mg, Intravenous, Q12H  multivitamin with minerals, 1 tablet, Oral, Daily  pantoprazole, 40 mg, Intravenous, Q12H  roflumilast, 500 mcg, Oral, Daily  rOPINIRole, 2 mg, Oral, Nightly  sertraline, 100 mg, Oral, Daily  sodium chloride, 10 mL, Intravenous, Q12H  sodium chloride, 10 mL, Intravenous, Q12H  spironolactone, 25 mg, Oral, Daily         Assessment & Plan     1. Acute on chronic HFpEF  2. PAF  3. Chronic hypoxemic respiratory failure/COPD  4. Enterococcus bacteremia  5. History of MV replacement  6. Anemia requiring transfusion    She continues to have a higher oxygen requirement than she does at baseline and she remains profoundly short of breath; she's had over 2L UOP in 24 hours and is feeling a bit better. I will continue higher dose bumetanide today and reassess weight/labs in AM. She will remain on spironolactone.     Her heart rate is generally controlled.  There is not much in  the way of blood pressure room to intensify her calcium channel blocker dose.  We are trying to avoid beta-blockers due to her lung disease.    Her hemoglobin remains somewhat low but stable.  She would ultimately benefit from GI evaluation but I agree that her cardiopulmonary status should be better before we undertake this, unless she were to have life-threatening bleeding, in which case it would be necessary to proceed.    Rodolfo Null MD  04/16/24  15:30 EDT

## 2024-04-16 NOTE — PLAN OF CARE
Goal Outcome Evaluation:  Plan of Care Reviewed With: patient           Outcome Evaluation: Patient seen for PT session this AM. Patient supine in bed upon arrival on 4L O2. Patient alert and agreeable to attempt OOB activity. Patient sat up to EOB with SBA. Patient required a seated rest break at EOB prior to standing. Patient ambulated 15ft in room with CGA and HHA. Patient declined use of rwx. Gait slow and shuffled with patient quick to fatigue. SpO2 77% after ambulation. Patient titrated up to 5L taking several minutes for O2 sats to return into the 90s. Patient and  report patient has been performing B LE exercises independently throughout the day. Encouraged to continue. Functional mobility progress remains largely limited by O2 needs. Acute PT will continue to monitor.

## 2024-04-16 NOTE — THERAPY TREATMENT NOTE
Patient Name: Paz Browne  : 1955    MRN: [id number]                              Today's Date: 2024       Admit Date: 2024    Visit Dx:     ICD-10-CM ICD-9-CM   1. Sepsis, due to unspecified organism, unspecified whether acute organ dysfunction present  A41.9 038.9     995.91   2. Acute on chronic congestive heart failure, unspecified heart failure type  I50.9 428.0   3. Atrial fibrillation with RVR  I48.91 427.31   4. History of atrial fibrillation  Z86.79 V12.59   5. Chronic anticoagulation  Z79.01 V58.61   6. Hypoxia  R09.02 799.02   7. History of COPD  Z87.09 V12.69   8. History of endocarditis  Z86.79 V12.59     Patient Active Problem List   Diagnosis    PAF (paroxysmal atrial fibrillation)    Hypertension    Hyperlipidemia    Pain medication agreement    Hiatal hernia    Primary osteoarthritis involving multiple joints    Pulmonary hypertension    S/P TVR (tricuspid valve repair)    Pulmonary nodule    Restless leg syndrome    Chronic low back pain    Dysplastic polyp of colon    History of mitral valve replacement with tissue graft    Chronic hypoxemic respiratory failure    Anemia    Celiac artery stenosis    Intertrigo    Abnormal EKG    Muscle spasms of both lower extremities    Iron deficiency anemia    Adenomatous polyp of colon    Gastroesophageal reflux disease without esophagitis    Headache    History of endocarditis    Pneumonia of both lower lobes due to infectious organism    Peptic ulcer disease    Peripheral vascular disease    Hyperlipidemia    Hyperglycemia    COPD with acute exacerbation    Chronic diastolic CHF (congestive heart failure)    Chronic bilateral low back pain    COPD exacerbation    Leukocytosis    Elevated troponin    Pneumonia    Acute-on-chronic respiratory failure    Septicemia due to enterococcus    Acute on chronic diastolic congestive heart failure    Sepsis    COPD (chronic obstructive pulmonary disease)     Past Medical History:   Diagnosis Date     VAN (acute kidney injury)     Anemia     Asthma     Atrial flutter     cardioversion    Cataract     Celiac artery stenosis     Chronic respiratory failure with hypoxia     Colon polyp     COPD (chronic obstructive pulmonary disease)     GI bleed     Hiatal hernia     History of CHF (congestive heart failure)     due to MR    History of home oxygen therapy     3 lpm NC    History of mitral valve replacement with tissue graft     Hyperlipidemia     Hypertension     Infectious viral hepatitis     B    Intertrigo     Long term (current) use of anticoagulants     Mitral regurgitation     s/p tissue MVR    PAF (paroxysmal atrial fibrillation)     s/p MAZE    Pneumonia     Pulmonary hypertension     S/P TVR (tricuspid valve repair) 7/7/2016     Past Surgical History:   Procedure Laterality Date    CARDIAC CATHETERIZATION  09/01/2014    Right dominant systemt, normal coronary arteries.     CARDIAC CATHETERIZATION Left 6/10/2016    Procedure: Cardiac catheterization;  Surgeon: Sergei Hall MD;  Location: [facility] CATH INVASIVE LOCATION;  Service:     CARDIAC CATHETERIZATION N/A 6/10/2016    Procedure: Right Heart Cath;  Surgeon: Sergei Hall MD;  [facility];  Service:     CATARACT EXTRACTION      COLONOSCOPY      COLONOSCOPY N/A 8/4/2017    Procedure: COLONOSCOPY TO CECUM/TI WITH POLYPECTOMY ( COLD BX);  Surgeon: Cleveland Devine MD;  Location: [facility] ENDOSCOPY;  Service:     COLONOSCOPY N/A 8/10/2017    Procedure: COLONOSCOPY to cecum and TI with 2 clips placed at transverse;  Surgeon: Earnest PALOMO MD;  Location: [facility] ENDOSCOPY;  Service:     COLONOSCOPY N/A 12/22/2017    Procedure: COLONOSCOPY INTO CECUM WITH COLD POLYPECTOMIES;  Surgeon: Cleveland Devine MD;  Location: [facility] ENDOSCOPY;  Service:     COLONOSCOPY N/A 5/17/2021    Procedure: COLONOSCOPY to cecum;  Surgeon: Cleveland Devine MD;  Location: [facility] ENDOSCOPY;  Service: Gastroenterology;  Laterality: N/A;  Pre: Fe deficency anemia, h/x  of polyps  Post: fair prep, normal    ENDOSCOPY N/A 8/17/2017    Procedure: ESOPHAGOGASTRODUODENOSCOPY;  Surgeon: Porsha Ruby MD;  Location: [facility] ENDOSCOPY;  Service:     ENDOSCOPY N/A 12/22/2017    Procedure: ESOPHAGOGASTRODUODENOSCOPY WITH BIOPSIES;  Surgeon: Cleveland Devine MD;  Location: [facility] ENDOSCOPY;  Service:     ENDOSCOPY N/A 5/17/2021    Procedure: ESOPHAGOGASTRODUODENOSCOPY  with biopsies;  Surgeon: Cleveland Devine MD;  Location: [facility] ENDOSCOPY;  Service: Gastroenterology;  Laterality: N/A;  Pre: Fe deficency anemia, nausea, heme positive stool   Post: gastritis, sloughing of distal esophagus mucosa    GALLBLADDER SURGERY      HEMORRHOIDECTOMY      HYSTERECTOMY      KIDNEY SURGERY  04/22/2013    Stent placement    MAZE PROCEDURE      MITRAL VALVE REPLACEMENT      TONSILLECTOMY      TRICUSPID VALVE REPLACEMENT        General Information       Row Name 04/16/24 0902          Physical Therapy Time and Intention    Document Type therapy note (daily note)  -     Mode of Treatment individual therapy;physical therapy  -       Row Name 04/16/24 0902          General Information    Patient Profile Reviewed yes  -     Existing Precautions/Restrictions fall;oxygen therapy device and L/min  -       Row Name 04/16/24 0902          Cognition    Orientation Status (Cognition) oriented x 3  -       Row Name 04/16/24 0902          Safety Issues, Functional Mobility    Impairments Affecting Function (Mobility) balance;endurance/activity tolerance;shortness of breath  -               User Key  (r) = Recorded By, (t) = Taken By, (c) = Cosigned By      Initials Name Provider Type     Kaelyn Velazquez PT Physical Therapist                   Mobility       Row Name 04/16/24 0903          Bed Mobility    Bed Mobility supine-sit  -     Supine-Sit [place] (Bed Mobility) standby assist  -     Assistive Device (Bed Mobility) bed rails;head of bed elevated  -     Comment, (Bed Mobility) Patient UIC  at end of session. Seated rest break at EOB prior to standing  -[facility] Name 04/16/24 0903          Sit-Stand Transfer    Sit-Stand [place] (Transfers) contact guard  -     Assistive Device (Sit-Stand Transfers) other (see comments)  HHA  -[facility] Name 04/16/24 0903          Gait/Stairs (Locomotion)    [place] Level (Gait) contact guard  -     Assistive Device (Gait) other (see comments)  HHA. Patient declines rwx  -     Distance in Feet (Gait) 15  -SM     Deviations/Abnormal Patterns (Gait) festinating/shuffling;kassidy decreased;gait speed decreased  -     Bilateral Gait Deviations forward flexed posture  -     Comment, (Gait/Stairs) Gait slow and shuffled with patient quick to fatigue. SpO2 77% after ambulation. Patient titrated up to 5L taking several minutes for O2 sats to return into the 90s.  -               User Key  (r) = Recorded By, (t) = Taken By, (c) = Cosigned By      Initials Name Provider Type     Kaelyn Velazquez PT Physical Therapist                   Obj/Interventions       [facility] Name 04/16/24 0904          Motor Skills    Therapeutic Exercise --  Patient has been performing bed ex independently as tolerated  -[facility] Name 04/16/24 0904          Balance    Balance Assessment sitting static balance;sitting dynamic balance;sit to stand dynamic balance;standing static balance;standing dynamic balance  -     Static Sitting Balance supervision  -     Dynamic Sitting Balance supervision  -     Position, Sitting Balance sitting edge of bed  -     Sit to Stand Dynamic Balance contact guard  -     Static Standing Balance standby assist  -     Dynamic Standing Balance contact guard  -     Position/Device Used, Standing Balance supported  -     Balance Interventions sitting;standing;sit to stand;supported;static;dynamic  -               User Key  (r) = Recorded By, (t) = Taken By, (c) = Cosigned By      Initials Name Provider Type    MABEL Velazquez  MILY Art Physical Therapist                   Goals/Plan    No documentation.                  Clinical Impression       Row Name 04/16/24 0905          Pain    Pretreatment Pain Rating 0/10 - no pain  -SM     Posttreatment Pain Rating 0/10 - no pain  -SM       Row Name 04/16/24 0905          Plan of Care Review    Plan of Care Reviewed With patient  -SM     Outcome Evaluation Patient seen for PT session this AM. Patient supine in bed upon arrival on 4L O2. Patient alert and agreeable to attempt OOB activity. Patient sat up to EOB with SBA. Patient required a seated rest break at EOB prior to standing. Patient ambulated 15ft in room with CGA and HHA. Patient declined use of rwx. Gait slow and shuffled with patient quick to fatigue. SpO2 77% after ambulation. Patient titrated up to 5L taking several minutes for O2 sats to return into the 90s. Patient and  report patient has been performing B LE exercises independently throughout the day. Encouraged to continue. Functional mobility progress remains largely limited by O2 needs. Acute PT will continue to monitor.  -SM       Row Name 04/16/24 0905          Vital Signs    O2 Delivery Pre Treatment supplemental O2  -SM     O2 Delivery Intra Treatment supplemental O2  -SM     O2 Delivery Post Treatment supplemental O2  -SM     Pre Patient Position Supine  -SM     Intra Patient Position Standing  -SM     Post Patient Position Sitting  -SM       Row Name 04/16/24 0905          Positioning and Restraints    Pre-Treatment Position in bed  -SM     Post Treatment Position chair  -SM     In Chair notified nsg;reclined;call light within reach;encouraged to call for assist;exit alarm on;with family/caregiver  -               User Key  (r) = Recorded By, (t) = Taken By, (c) = Cosigned By      Initials Name Provider Type     Kaelyn Velazquez PT Physical Therapist                   Outcome Measures       Row Name 04/16/24 0907          How much help from another person  do you currently need...    Turning from your back to your side while in flat bed without using bedrails? 4  -SM     Moving from lying on back to sitting on the side of a flat bed without bedrails? 3  -SM     Moving to and from a bed to a chair (including a wheelchair)? 3  -SM     Standing up from a chair using your arms (e.g., wheelchair, bedside chair)? 3  -SM     Climbing 3-5 steps with a railing? 2  -SM     To walk in hospital room? 3  -SM     AM-PAC 6 Clicks Score (PT) 18  -     Highest Level of Mobility Goal 6 --> Walk 10 steps or more  -       Row Name 04/16/24 0907          Functional Assessment    Outcome Measure Options AM-PAC 6 Clicks Basic Mobility (PT)  -               User Key  (r) = Recorded By, (t) = Taken By, (c) = Cosigned By      Initials Name Provider Type     Kaelyn Velazquez, PT Physical Therapist                                 Physical Therapy Education       Title: PT OT SLP Therapies (In Progress)       Topic: Physical Therapy (Done)       Point: Mobility training (Done)       Learning Progress Summary             Patient Acceptance, E, VU by  at 4/16/2024 0907    Eager, E,TB,D, VU,NR by MANAS at 4/15/2024 1251    Acceptance, E,D, VU,NR by MS at 4/14/2024 1349    Acceptance, E,D, NR by  at 4/12/2024 1421   Family Eager, E,TB,D, VU,NR by MANAS at 4/15/2024 1251                         Point: Home exercise program (Done)       Learning Progress Summary             Patient Acceptance, E, VU by  at 4/16/2024 0907    Eager, E,TB,D, VU,NR by MANAS at 4/15/2024 1251    Acceptance, E,D, VU,NR by MS at 4/14/2024 1349   Family Eager, E,TB,D, VU,NR by  at 4/15/2024 1251                         Point: Body mechanics (Done)       Learning Progress Summary             Patient Acceptance, E, VU by  at 4/16/2024 0907    Eager, E,TB,D, VU,NR by MANAS at 4/15/2024 1251    Acceptance, E,D, VU,NR by MS at 4/14/2024 1349   Family Eager, E,EFFIE FORD, SUDHAKAR,NR by MANAS at 4/15/2024 1251                         Point:  Precautions (Done)       Learning Progress Summary             Patient Acceptance, E, VU by  at 4/16/2024 0907    Eager, E,TB,D, VU,NR by  at 4/15/2024 1251    Acceptance, E,D, VU,NR by MS at 4/14/2024 1349   Family Eager, E,TB,D, VU,NR by  at 4/15/2024 1251                                         User Key       Initials Effective Dates Name Provider Type Discipline     06/16/21 -  Brianna Dejesus, PT Physical Therapist PT     03/07/18 -  Trish Newby PTA Physical Therapist Assistant PT    MS 06/16/21 -  Jose Luis Latif, PT Physical Therapist PT     05/02/22 -  Kaelyn Velazquez, PT Physical Therapist PT                  PT Recommendation and Plan     Plan of Care Reviewed With: patient  Outcome Evaluation: Patient seen for PT session this AM. Patient supine in bed upon arrival on 4L O2. Patient alert and agreeable to attempt OOB activity. Patient sat up to EOB with SBA. Patient required a seated rest break at EOB prior to standing. Patient ambulated 15ft in room with CGA and HHA. Patient declined use of rwx. Gait slow and shuffled with patient quick to fatigue. SpO2 77% after ambulation. Patient titrated up to 5L taking several minutes for O2 sats to return into the 90s. Patient and  report patient has been performing B LE exercises independently throughout the day. Encouraged to continue. Functional mobility progress remains largely limited by O2 needs. Acute PT will continue to monitor.     Time Calculation:         PT Charges       Row Name 04/16/24 0908             Time Calculation    Start Time 0821  -      Stop Time 0833  -      Time Calculation (min) 12 min  -      PT Received On 04/16/24  -      PT - Next Appointment 04/17/24  -         Time Calculation- PT    Total Timed Code Minutes- PT 12 minute(s)  -         Timed Charges    43216 - PT Therapeutic Activity Minutes 12  -SM         Total Minutes    Timed Charges Total Minutes 12  -SM       Total Minutes 12  -                 User Key  (r) = Recorded By, (t) = Taken By, (c) = Cosigned By      Initials Name Provider Type     Kaelyn Velazquez PT Physical Therapist                  Therapy Charges for Today       Code Description Service Date Service Provider Modifiers Qty    [id number]  PT THERAPEUTIC ACT EA 15 MIN 4/16/2024 Kaelyn Velazquez PT GP 1            PT G-Codes  Outcome Measure Options: AM-PAC 6 Clicks Basic Mobility (PT)  AM-PAC 6 Clicks Score (PT): 18  AM-PAC 6 Clicks Score (OT): 16       Kaelyn Velazquez PT  4/16/2024

## 2024-04-16 NOTE — PROGRESS NOTES
Dr. ANGELA Gage    [address]        Patient ID:  Name:  Paz Browne  MRN:  [id number]  1955  68 y.o.  female            CC/Reason for visit: Acute respiratory failure, sepsis, bacteremia    Interval hx: Patient complains of shortness of breath with minimal exertion, not much is changed.  She says she has a little bit more energy.  Denies sputum or cough    ROS: No hemoptysis, no chest pain    Vitals:  Vitals:    04/16/24 0935 04/16/24 0939 04/16/24 1202 04/16/24 1557   BP:   104/51 118/55   BP Location:   Right arm    Patient Position:   Lying    Pulse: (!) 128 (!) 122 85 94   Resp: 18 18 18    Temp:   97.9 °F (36.6 °C)    TempSrc:   Oral    SpO2: 95%  96% 98%   Weight:       Height:               Body mass index is 23.58 kg/m².    Intake/Output Summary (Last 24 hours) at 4/16/2024 1729  Last data filed at 4/16/2024 1000  Gross per 24 hour   Intake 240 ml   Output 2800 ml   Net -2560 ml       Exam:  GEN:  No distress  Alert, oriented x 3.   LUNGS: Barrel chest, distant and diminished sounds bilat, no use of accessory muscles  CV:  Irregularly irregular, distant heart tones, without murmur, no edema  ABD:  Non tender, no enlarged liver or masses      Scheduled meds:  ampicillin, 2 g, Intravenous, Q4H  [Held by provider] apixaban, 5 mg, Oral, Q12H  atorvastatin, 40 mg, Oral, Daily  budesonide, 0.5 mg, Nebulization, BID - RT  bumetanide, 2 mg, Intravenous, Q8H  cefTRIAXone (ROCEPHIN) 2,000 mg in sodium chloride 0.9 % 100 mL MBP, 2,000 mg, Intravenous, Q12H  cetirizine, 10 mg, Oral, Daily  cyclobenzaprine, 10 mg, Oral, Nightly  dilTIAZem, 60 mg, Oral, Q6H  methylPREDNISolone sodium succinate, 40 mg, Intravenous, Q12H  multivitamin with minerals, 1 tablet, Oral, Daily  pantoprazole, 40 mg, Intravenous, Q12H  roflumilast, 500 mcg, Oral, Daily  rOPINIRole, 2 mg, Oral, Nightly  sertraline, 100 mg, Oral, Daily  sodium chloride, 10 mL, Intravenous, Q12H  sodium chloride, 10 mL, Intravenous,  Q12H  spironolactone, 25 mg, Oral, Daily      IV meds:                           Data Review:   I reviewed the patient's medications and new clinical results.    COVID19   Date Value Ref Range Status   04/11/2024 Not Detected Not Detected - Ref. Range Final         Lab Results   Component Value Date    CALCIUM 8.6 04/16/2024    PHOS 2.9 04/16/2024    MG 2.0 04/13/2024    MG 2.0 04/06/2024    MG 1.9 02/17/2024     Results from last 7 days   Lab Units 04/16/24  0648 04/15/24  1825 04/15/24  1649 04/15/24  0509 04/14/24  0545 04/13/24  2047 04/13/24  0550 04/12/24  0534 04/12/24  0533 04/11/24  1800   SODIUM mmol/L 144  --   --  142 142  --  146*  --  144 144   POTASSIUM mmol/L 3.3*  --  3.8 3.4* 3.5   < > 3.6   < > 3.6 3.1*   CHLORIDE mmol/L 94*  --   --  94* 95*  --  99  --  102 99   CO2 mmol/L 40.7*  --   --  38.0* 34.3*  --  36.2*  --  33.4* 33.0*   BUN mg/dL 12  --   --  9 8  --  9  --  8 9   CREATININE mg/dL 0.73  --   --  0.68 0.72  --  0.78  --  0.82 0.92   CALCIUM mg/dL 8.6  --   --  8.8 9.0  --  8.6  --  7.9* 8.2*   BILIRUBIN mg/dL 0.4  --   --   --   --   --  0.5  --  0.5  --    ALK PHOS U/L 55  --   --   --   --   --  66  --  69  --    ALT (SGPT) U/L 11  --   --   --   --   --  15  --  17  --    AST (SGOT) U/L 16  --   --   --   --   --  23 --  24  --    GLUCOSE mg/dL 122*  --   --  128* 114*  --  102*  --  126* 128*   WBC 10*3/mm3 12.74*  --   --  8.48  --   --  11.01*   < >  --  15.16*   HEMOGLOBIN g/dL 8.5* 9.1*  --  8.3*  --   --  8.7*   < >  --  7.2*   PLATELETS 10*3/mm3 223  --   --  206  --   --  248   < >  --  422   PROBNP pg/mL  --   --   --   --   --   --   --   --   --  4,313.0*   PROCALCITONIN ng/mL  --   --   --   --  0.05  --   --   --   --  0.06    < > = values in this interval not displayed.     Results from last 7 days   Lab Units 04/13/24 2042 04/12/24 0016 04/11/24 1938 04/11/24 1926   BLOODCX   --   --  No growth at 4 days No growth at 4 days   RESPCX  Rejected Rejected  --   --             ASSESSMENT:     Acute on chronic diastolic congestive heart failure    PAF (paroxysmal atrial fibrillation)    Chronic hypoxemic respiratory failure    Iron deficiency anemia    Pneumonia    Septicemia due to enterococcus    COPD (chronic obstructive pulmonary disease)  Pulmonary edema        PLAN:  She is close to baseline oxygen requirements now.  Some more between 3 and 4 L, and at home she was doing 2 to 3 L.  Continue some gentle diuresis as per cardiology.  Continue same regimen of bronchodilators for her COPD maintenance.  On antibiotics as per infectious diseases for her bacteremia.  Not much else to offer from respiratory standpoint  She needs to start mobilizing with physical therapy out of bed.  She is quite deconditioned and frail  We will be available if needed      David Gage MD  4/16/2024

## 2024-04-16 NOTE — PLAN OF CARE
Problem: Adult Inpatient Plan of Care  Goal: Plan of Care Review  Outcome: Ongoing, Progressing  Flowsheets (Taken 4/16/2024 1805)  Progress: improving  Plan of Care Reviewed With: patient  Outcome Evaluation: SOA with minimal exertion - pt reports somewhat better.  O2 at 4-5L NC. IV abx via PICC - dsg CDI. K replaced per order. Awaiting updated labs.  Goal: Patient-Specific Goal (Individualized)  Outcome: Ongoing, Progressing  Goal: Absence of Hospital-Acquired Illness or Injury  Outcome: Ongoing, Progressing  Intervention: Identify and Manage Fall Risk  Recent Flowsheet Documentation  Taken 4/16/2024 1804 by Trish Moore RN  Safety Promotion/Fall Prevention:   fall prevention program maintained   safety round/check completed  Taken 4/16/2024 1630 by Trish Moore RN  Safety Promotion/Fall Prevention:   fall prevention program maintained   safety round/check completed  Taken 4/16/2024 1450 by Trish Moore RN  Safety Promotion/Fall Prevention:   fall prevention program maintained   safety round/check completed  Taken 4/16/2024 1200 by Trish Moore RN  Safety Promotion/Fall Prevention:   fall prevention program maintained   safety round/check completed  Taken 4/16/2024 1030 by Trish Moore RN  Safety Promotion/Fall Prevention:   fall prevention program maintained   safety round/check completed  Taken 4/16/2024 0921 by Trish Moore RN  Safety Promotion/Fall Prevention:   fall prevention program maintained   safety round/check completed  Taken 4/16/2024 0800 by Trish Moore RN  Safety Promotion/Fall Prevention:   fall prevention program maintained   safety round/check completed  Goal: Optimal Comfort and Wellbeing  Outcome: Ongoing, Progressing  Intervention: Monitor Pain and Promote Comfort  Recent Flowsheet Documentation  Taken 4/16/2024 0921 by Trish Moore RN  Pain Management Interventions: see MAR  Goal: Readiness for Transition of Care  Outcome: Ongoing,  Progressing     Problem: Adjustment to Illness (Sepsis/Septic Shock)  Goal: Optimal Coping  Outcome: Ongoing, Progressing     Problem: Bleeding (Sepsis/Septic Shock)  Goal: Absence of Bleeding  Outcome: Ongoing, Progressing     Problem: Glycemic Control Impaired (Sepsis/Septic Shock)  Goal: Blood Glucose Level Within Desired Range  Outcome: Ongoing, Progressing     Problem: Infection Progression (Sepsis/Septic Shock)  Goal: Absence of Infection Signs and Symptoms  Outcome: Ongoing, Progressing     Problem: Nutrition Impaired (Sepsis/Septic Shock)  Goal: Optimal Nutrition Intake  Outcome: Ongoing, Progressing     Problem: Heart Failure Comorbidity  Goal: Maintenance of Heart Failure Symptom Control  Outcome: Ongoing, Progressing     Problem: Hypertension Comorbidity  Goal: Blood Pressure in Desired Range  Outcome: Ongoing, Progressing     Problem: Pain Chronic (Persistent) (Comorbidity Management)  Goal: Acceptable Pain Control and Functional Ability  Outcome: Ongoing, Progressing  Intervention: Develop Pain Management Plan  Recent Flowsheet Documentation  Taken 4/16/2024 0921 by Trish Moore RN  Pain Management Interventions: see MAR     Problem: Skin Injury Risk Increased  Goal: Skin Health and Integrity  Outcome: Ongoing, Progressing  Intervention: Optimize Skin Protection  Recent Flowsheet Documentation  Taken 4/16/2024 1450 by Trish Moore RN  Pressure Reduction Techniques: frequent weight shift encouraged  Pressure Reduction Devices:   alternating pressure pump (ADD)   pressure-redistributing mattress utilized  Taken 4/16/2024 0921 by Trish Moore RN  Pressure Reduction Techniques:   frequent weight shift encouraged   weight shift assistance provided  Pressure Reduction Devices: alternating pressure pump (ADD)     Problem: Fall Injury Risk  Goal: Absence of Fall and Fall-Related Injury  Outcome: Ongoing, Progressing  Intervention: Promote Injury-Free Environment  Recent Flowsheet  Documentation  Taken 4/16/2024 1804 by Trish Moore RN  Safety Promotion/Fall Prevention:   fall prevention program maintained   safety round/check completed  Taken 4/16/2024 1630 by Trish Moore RN  Safety Promotion/Fall Prevention:   fall prevention program maintained   safety round/check completed  Taken 4/16/2024 1450 by Trish Moore RN  Safety Promotion/Fall Prevention:   fall prevention program maintained   safety round/check completed  Taken 4/16/2024 1200 by Trish Moore RN  Safety Promotion/Fall Prevention:   fall prevention program maintained   safety round/check completed  Taken 4/16/2024 1030 by Trish Moore RN  Safety Promotion/Fall Prevention:   fall prevention program maintained   safety round/check completed  Taken 4/16/2024 0921 by Trish Moore RN  Safety Promotion/Fall Prevention:   fall prevention program maintained   safety round/check completed  Taken 4/16/2024 0800 by Trish Moore RN  Safety Promotion/Fall Prevention:   fall prevention program maintained   safety round/check completed   Goal Outcome Evaluation:  Plan of Care Reviewed With: patient        Progress: improving  Outcome Evaluation: SOA with minimal exertion - pt reports somewhat better.  O2 at 4-5L NC. IV abx via PICC - dsg CDI. K replaced per order. Awaiting updated labs.

## 2024-04-16 NOTE — PROGRESS NOTES
LOS: 5 days     Chief Complaint:  concern for sepsis     Interval History: No fever.  WBC elevated but on high-dose IV steroids.  Discussed with her  at the bedside.  They both say she is improved today.    Meds:    Current Facility-Administered Medications:     acetaminophen (TYLENOL) tablet 650 mg, 650 mg, Oral, Q4H PRN, Susanne Bello MD    albuterol (PROVENTIL) nebulizer solution 0.083% 2.5 mg/3mL, 2.5 mg, Nebulization, Q6H PRN, Susanne Bello MD, 2.5 mg at 04/15/24 1922    ampicillin 2000 mg IVPB in 100 mL NS (MBP), 2 g, Intravenous, Q4H, Hanna Narvaez APRN, Last Rate: 200 mL/hr at 04/16/24 0520, 2 g at 04/16/24 0520    [Held by provider] apixaban (ELIQUIS) tablet 5 mg, 5 mg, Oral, Q12H, Susanne Bello MD, 5 mg at 04/11/24 2351    atorvastatin (LIPITOR) tablet 40 mg, 40 mg, Oral, Daily, Susanne Bello MD, 40 mg at 04/15/24 0827    sennosides-docusate (PERICOLACE) 8.6-50 MG per tablet 2 tablet, 2 tablet, Oral, BID PRN **AND** polyethylene glycol (MIRALAX) packet 17 g, 17 g, Oral, Daily PRN **AND** bisacodyl (DULCOLAX) EC tablet 5 mg, 5 mg, Oral, Daily PRN **AND** bisacodyl (DULCOLAX) suppository 10 mg, 10 mg, Rectal, Daily PRN, Susanne Bello MD    budesonide (PULMICORT) nebulizer solution 0.5 mg, 0.5 mg, Nebulization, BID - RT, Susanne Bello MD, 0.5 mg at 04/15/24 1922    bumetanide (BUMEX) injection 2 mg, 2 mg, Intravenous, Q8H, Rodolfo Null MD, 2 mg at 04/16/24 0126    Calcium Replacement - Follow Nurse / BPA Driven Protocol, , Does not apply, PRN, Susanne Bello MD    cefTRIAXone (ROCEPHIN) 2,000 mg in sodium chloride 0.9 % 100 mL MBP, 2,000 mg, Intravenous, Q12H, Susanne Bello MD, Last Rate: 200 mL/hr at 04/16/24 0756, 2,000 mg at 04/16/24 0756    cetirizine (zyrTEC) tablet 10 mg, 10 mg, Oral, Daily, Susanne Bello MD, 10 mg at 04/15/24 0826    cyclobenzaprine (FLEXERIL) tablet 10 mg, 10 mg, Oral, Nightly, Eugenia  Susanne Estrella MD, 10 mg at 04/15/24 2120    dilTIAZem (CARDIZEM) tablet 60 mg, 60 mg, Oral, Q6H, Jeremiah Saab MD, 60 mg at 04/16/24 0125    diphenhydrAMINE (BENADRYL) capsule 25 mg, 25 mg, Oral, Once PRN, Ronald Marie MD    HYDROcodone-acetaminophen (NORCO) 7.5-325 MG per tablet 1 tablet, 1 tablet, Oral, Q6H PRN, Susanne Bello MD, 1 tablet at 04/15/24 2121    ipratropium-albuterol (DUO-NEB) nebulizer solution 3 mL, 3 mL, Nebulization, Q4H PRN, Susanne Bello MD, 3 mL at 04/16/24 0520    Magnesium Standard Dose Replacement - Follow Nurse / BPA Driven Protocol, , Does not apply, PRN, Susanne Bello MD    melatonin tablet 3 mg, 3 mg, Oral, Nightly PRN, Susanne Bello MD, 3 mg at 04/15/24 2119    methylPREDNISolone sodium succinate (SOLU-Medrol) injection 40 mg, 40 mg, Intravenous, Q12H, Jose Luis Conteh MD, 40 mg at 04/16/24 0225    multivitamin with minerals 1 tablet, 1 tablet, Oral, Daily, Susanne Bello MD, 1 tablet at 04/15/24 0827    ondansetron ODT (ZOFRAN-ODT) disintegrating tablet 4 mg, 4 mg, Oral, Q6H PRN, 4 mg at 04/12/24 1752 **OR** ondansetron (ZOFRAN) injection 4 mg, 4 mg, Intravenous, Q6H PRN, Susanne Bello MD, 4 mg at 04/15/24 2122    pantoprazole (PROTONIX) injection 40 mg, 40 mg, Intravenous, Q12H, Jose Luis Conteh MD, 40 mg at 04/15/24 2121    Phosphorus Replacement - Follow Nurse / BPA Driven Protocol, , Does not apply, PRNEugenia Renate Andrea, MD    potassium chloride (K-DUR,KLOR-CON) ER tablet 40 mEq, 40 mEq, Oral, Q4H, Arina Castaneda MD    Potassium Replacement - Follow Nurse / BPA Driven Protocol, , Does not apply, PRN, Susanne Bello MD    roflumilast (DALIRESP) tablet 500 mcg, 500 mcg, Oral, Daily, Susanne Bello MD, 500 mcg at 04/15/24 0827    rOPINIRole (REQUIP) tablet 2 mg, 2 mg, Oral, Nightly, Susanne Bello MD, 2 mg at 04/15/24 2119    sertraline (ZOLOFT) tablet 100 mg, 100  mg, Oral, Daily, StinglSusanne MD, 100 mg at 04/15/24 0827    sodium chloride 0.9 % flush 10 mL, 10 mL, Intravenous, Q12H, Jose Luis Conteh MD, 10 mL at 04/15/24 2123    sodium chloride 0.9 % flush 10 mL, 10 mL, Intravenous, Q12H, Jose Luis Conteh MD, 10 mL at 04/15/24 2123    sodium chloride 0.9 % flush 10 mL, 10 mL, Intravenous, PRN, Jose Luis Conteh MD    sodium chloride 0.9 % flush 20 mL, 20 mL, Intravenous, PRN, Jose Luis Conteh MD    sodium chloride 0.9 % infusion 40 mL, 40 mL, Intravenous, PRN, Jose Luis Conteh MD    sodium chloride nasal spray 2 spray, 2 spray, Each Nare, PRN, Jose Luis Conteh MD    spironolactone (ALDACTONE) tablet 25 mg, 25 mg, Oral, Daily, Jeremiah Saab MD, 25 mg at 04/15/24 0827        Vital Signs  Temp:  [97.5 °F (36.4 °C)-98.1 °F (36.7 °C)] 97.5 °F (36.4 °C)  Heart Rate:  [] 82  Resp:  [18-20] 18  BP: (105-143)/(52-75) 143/52    Physical Exam:  General: Awake, alert, sitting up in bed, more comfortable today  Cardiovascular: Normal rate  Respiratory: Fine bilateral lower lobe rales and scattered wheezing  GI: Soft, not tender or distended  Vascular: LUE PICC w/o erythema    Results Review:    CBC, BMP, and blood cultures reviewed today  Lab Results   Component Value Date    WBC 12.74 (H) 04/16/2024    HGB 8.5 (L) 04/16/2024    HCT 27.3 (L) 04/16/2024    MCV 88.6 04/16/2024     04/16/2024     Lab Results   Component Value Date    GLUCOSE 122 (H) 04/16/2024    BUN 12 04/16/2024    CREATININE 0.73 04/16/2024    EGFRIFNONA 61 08/16/2021    EGFRIFAFRI 86 07/12/2021    BCR 16.4 04/16/2024    CO2 40.7 (H) 04/16/2024    CALCIUM 8.6 04/16/2024    ALBUMIN 3.3 (L) 04/16/2024    AST 16 04/16/2024    ALT 11 04/16/2024     Procalcitonin 0.06    Microbiology:  3/31 RPP: Negative  3/31 BCx: Enterococcus faecalis (not VRE) in 2/2 sets (susceptible to ampicillin)  4/1 MRSA nares PCR: Negative  4/2 blood cultures:  "Negative to date  4/11 RPP: Negative  4/11 BCx: Negative to date  4/12 respiratory culture: \"Rejected\"    Recent radiology:  4/12 CT of the chest with small bilateral pleural effusions.  Bibasilar opacification right greater than left with some nodular component concerning for multifocal pneumonia possibly aspiration.  Thickening of distal esophagus suggesting esophagitis.  Severe emphysema.    4/14 CXR personally reviewed and shows pleural effusions and findings consistent with pulmonary edema.    Assessment & Plan   Shortness of breath due to pulmonary edema  Atrial fibrillation with rapid ventricular response  Enterococcus septicemia  Mitral valve replaced  History of tricuspid valve repair  COPD   Pulmonary hypertension    She is afebrile with stable oxygen needs.  Her WBC is slightly higher today but she is on high-dose intravenous steroids which are likely the culprit.  Her blood cultures this admission   are negative to date.      I recommend that she continue her previous antibiotic plan of ampicillin 2 g IV every 4 hours and ceftriaxone 2 g IV every 12 hours for 6 weeks course with stop date 5/13/2024.    Check CBC and BMP in the a.m. for close monitoring while on broad-spectrum antibiotics.    Thanks to cardiologist for recommendations regarding diuresis.    ID will follow.  "

## 2024-04-16 NOTE — PROGRESS NOTES
[facility]  Inpatient Progress Note    Reason for Follow Up: Melena    Subjective     Interval History:     Hemoglobin 8.5 this morning.  Tentative EGD and colonoscopy when she has improved from a cardiopulmonary standpoint.    No acute events overnight.  She passed this morning brown stool.  Denies abdominal pain.  Still with poor appetite.  Remains on O2 per nasal cannula with continued shortness of breath.      Current Facility-Administered Medications:     acetaminophen (TYLENOL) tablet 650 mg, 650 mg, Oral, Q4H PRN, Susanne Bello MD    albuterol (PROVENTIL) nebulizer solution 0.083% 2.5 mg/3mL, 2.5 mg, Nebulization, Q6H PRN, Susanne Bello MD, 2.5 mg at 04/15/24 1922    ampicillin 2000 mg IVPB in 100 mL NS (MBP), 2 g, Intravenous, Q4H, Hanna Narvaez APRN, Last Rate: 200 mL/hr at 04/16/24 0520, 2 g at 04/16/24 0520    [Held by provider] apixaban (ELIQUIS) tablet 5 mg, 5 mg, Oral, Q12H, Susanne Bello MD, 5 mg at 04/11/24 2351    atorvastatin (LIPITOR) tablet 40 mg, 40 mg, Oral, Daily, Susanne Bello MD, 40 mg at 04/15/24 0827    sennosides-docusate (PERICOLACE) 8.6-50 MG per tablet 2 tablet, 2 tablet, Oral, BID PRN **AND** polyethylene glycol (MIRALAX) packet 17 g, 17 g, Oral, Daily PRN **AND** bisacodyl (DULCOLAX) EC tablet 5 mg, 5 mg, Oral, Daily PRN **AND** bisacodyl (DULCOLAX) suppository 10 mg, 10 mg, Rectal, Daily PRN, Susanne Bello MD    budesonide (PULMICORT) nebulizer solution 0.5 mg, 0.5 mg, Nebulization, BID - RT, Susanne Bello MD, 0.5 mg at 04/15/24 1922    bumetanide (BUMEX) injection 2 mg, 2 mg, Intravenous, Q8H, Rodolfo Null MD, 2 mg at 04/16/24 0126    Calcium Replacement - Follow Nurse / BPA Driven Protocol, , Does not apply, PRN, Susanne Bello MD    cefTRIAXone (ROCEPHIN) 2,000 mg in sodium chloride 0.9 % 100 mL MBP, 2,000 mg, Intravenous, Q12H, Susanne Bello MD, Last Rate: 200 mL/hr at  04/15/24 2137, 2,000 mg at 04/15/24 2137    cetirizine (zyrTEC) tablet 10 mg, 10 mg, Oral, Daily, Susanne Bello MD, 10 mg at 04/15/24 0826    cyclobenzaprine (FLEXERIL) tablet 10 mg, 10 mg, Oral, Nightly, Susanne Bello MD, 10 mg at 04/15/24 2120    dilTIAZem (CARDIZEM) tablet 60 mg, 60 mg, Oral, Q6H, Jeremiah Saab MD, 60 mg at 04/16/24 0125    diphenhydrAMINE (BENADRYL) capsule 25 mg, 25 mg, Oral, Once PRN, Ronald Marie MD    HYDROcodone-acetaminophen (NORCO) 7.5-325 MG per tablet 1 tablet, 1 tablet, Oral, Q6H PRN, Susanne Bello MD, 1 tablet at 04/15/24 2121    ipratropium-albuterol (DUO-NEB) nebulizer solution 3 mL, 3 mL, Nebulization, Q4H PRN, Susanne Bello MD, 3 mL at 04/16/24 0520    Magnesium Standard Dose Replacement - Follow Nurse / BPA Driven Protocol, , Does not apply, PRN, Susanne Bello MD    melatonin tablet 3 mg, 3 mg, Oral, Nightly PRN, Susanne Bello MD, 3 mg at 04/15/24 2119    methylPREDNISolone sodium succinate (SOLU-Medrol) injection 40 mg, 40 mg, Intravenous, Q12H, Jose Luis Conteh MD, 40 mg at 04/16/24 0225    multivitamin with minerals 1 tablet, 1 tablet, Oral, Daily, Susanne Bello MD, 1 tablet at 04/15/24 0827    ondansetron ODT (ZOFRAN-ODT) disintegrating tablet 4 mg, 4 mg, Oral, Q6H PRN, 4 mg at 04/12/24 6962 **OR** ondansetron (ZOFRAN) injection 4 mg, 4 mg, Intravenous, Q6H PRN, Susanne Bello MD, 4 mg at 04/15/24 2122    pantoprazole (PROTONIX) injection 40 mg, 40 mg, Intravenous, Q12H, Jose Luis Conteh MD, 40 mg at 04/15/24 2121    Phosphorus Replacement - Follow Nurse / BPA Driven Protocol, , Does not apply, Eugenia SHARP Renate Andrea, MD    potassium chloride (K-DUR,KLOR-CON) ER tablet 40 mEq, 40 mEq, Oral, Q4H, Arina Castaneda MD    Potassium Replacement - Follow Nurse / BPA Driven Protocol, , Does not apply, Eugenia SHARP Renate Andrea, MD    roflumilast (DALIRESP) tablet 500 mcg,  500 mcg, Oral, Daily, Susanne Bello MD, 500 mcg at 04/15/24 0827    rOPINIRole (REQUIP) tablet 2 mg, 2 mg, Oral, Nightly, Susanne Bello MD, 2 mg at 04/15/24 2119    sertraline (ZOLOFT) tablet 100 mg, 100 mg, Oral, Daily, Susanne Bello MD, 100 mg at 04/15/24 0827    sodium chloride 0.9 % flush 10 mL, 10 mL, Intravenous, Q12H, Jose Luis Conteh MD, 10 mL at 04/15/24 2123    sodium chloride 0.9 % flush 10 mL, 10 mL, Intravenous, Q12H, Jose Luis Conteh MD, 10 mL at 04/15/24 2123    sodium chloride 0.9 % flush 10 mL, 10 mL, Intravenous, PRN, Jose Luis Conteh MD    sodium chloride 0.9 % flush 20 mL, 20 mL, Intravenous, PRN, Jose Luis Conteh MD    sodium chloride 0.9 % infusion 40 mL, 40 mL, Intravenous, PRN, Jose Luis Conteh MD    sodium chloride nasal spray 2 spray, 2 spray, Each Nare, LILA, Jose Luis Conteh MD    spironolactone (ALDACTONE) tablet 25 mg, 25 mg, Oral, Daily, Jeremiah Saab MD, 25 mg at 04/15/24 0827  Review of Systems:    The following systems were reviewed and negative;  gastrointestinal    Objective     Vital Signs  Temp:  [97.5 °F (36.4 °C)-98.1 °F (36.7 °C)] 97.5 °F (36.4 °C)  Heart Rate:  [] 82  Resp:  [18-20] 18  BP: (105-143)/(52-75) 143/52  Body mass index is 23.58 kg/m².    Intake/Output Summary (Last 24 hours) at 4/16/2024 0738  Last data filed at 4/16/2024 0540  Gross per 24 hour   Intake 360 ml   Output 2300 ml   Net -1940 ml     No intake/output data recorded.     Physical Exam:   General: patient awake, alert and cooperative   Abdomen: soft, nontender, nondistended; normal bowel sounds      Results Review:     I reviewed the patient's new clinical results.    Results from last 7 days   Lab Units 04/16/24  0648 04/15/24  1825 04/15/24  0509 04/13/24  0550   WBC 10*3/mm3 12.74*  --  8.48 11.01*   HEMOGLOBIN g/dL 8.5* 9.1* 8.3* 8.7*   HEMATOCRIT % 27.3* 29.2* 26.6* 28.6*   PLATELETS 10*3/mm3 223   --  206 248     Results from last 7 days   Lab Units 04/16/24  0648 04/15/24  1649 04/15/24  0509 04/14/24  0545 04/13/24  2047 04/13/24  0550 04/12/24  1049 04/12/24  0533   SODIUM mmol/L 144  --  142 142  --  146*  --  144   POTASSIUM mmol/L 3.3* 3.8 3.4* 3.5   < > 3.6   < > 3.6   CHLORIDE mmol/L 94*  --  94* 95*  --  99  --  102   CO2 mmol/L 40.7*  --  38.0* 34.3*  --  36.2*  --  33.4*   BUN mg/dL 12  --  9 8  --  9  --  8   CREATININE mg/dL 0.73  --  0.68 0.72  --  0.78  --  0.82   CALCIUM mg/dL 8.6  --  8.8 9.0  --  8.6  --  7.9*   BILIRUBIN mg/dL 0.4  --   --   --   --  0.5  --  0.5   ALK PHOS U/L 55  --   --   --   --  66  --  69   ALT (SGPT) U/L 11  --   --   --   --  15  --  17   AST (SGOT) U/L 16  --   --   --   --  23  --  24   GLUCOSE mg/dL 122*  --  128* 114*  --  102*  --  126*    < > = values in this interval not displayed.         Lab Results   Lab Value Date/Time    LIPASE 30 06/09/2023 0445    LIPASE 46 08/13/2017 0449       Radiology:  XR Chest 1 View   Final Result   1. FINDINGS consistent with bilateral pulmonary edema and pleural   effusions. These findings may have progressed slightly since the   4/11/2024 study.           This report was finalized on 4/14/2024 2:22 PM by Dr. Donis Guzmán M.D on Workstation: [id number]          CT Chest Without Contrast Diagnostic   Final Result   1.  Small bilateral pleural effusions with bibasilar pulmonary   pacification, right greater than left. Some areas have a somewhat   nodular appearance. Findings are most suggestive of multifocal   pneumonia, possibly aspiration, and correlation with patient history is   recommended to determine most appropriate etiology. Given the nodular   appearance, follow-up with chest CT in 6 to 8 weeks is recommended to   ensure resolution and exclude the possibility of malignancy. New   mediastinal adenopathy enlarged since three 224 can be followed at this   time as well.   2.  Thickening of distal esophagus suggestive  of esophagitis in   appropriate context. Correlation with patient history is recommended   with follow-up endoscopy if clinically indicated.   3.  Severe emphysema.   4.  Other findings as above.               This report was finalized on 4/12/2024 5:56 PM by Dr. Papo De La Torre M.D   on Workstation: [id number]          XR Chest 1 View   Final Result   Findings impression:   Background interstitial thickening is present throughout the bilateral   lungs with many areas demonstrate a somewhat nodular appearance which   cannot be evaluated on plain film radiographs, similar that seen on   prior radiographs, and best seen on CT chest 3/2/2024. Please refer to   this dictation for further formation and follow-up recommendations..   There is worsening pulmonary pacification within the right lung with   probable small bilateral pleural effusions. Cardiac silhouette is mildly   enlarged. Constellation findings are suggestive of worsening pulmonary   edema and/or multifocal pneumonia in the appropriate clinical context.   Correlation with patient history is recommended with follow-up chest CT   if clinically indicated. No pneumothorax is seen. There are median   sternotomy wires. A left PICC tip terminates over the expected location   of the superior vena cava.               This report was finalized on 4/11/2024 7:03 PM by Dr. Papo De La Torre M.D   on Workstation: [id number]              Assessment & Plan     Active Hospital Problems    Diagnosis     **Acute on chronic diastolic congestive heart failure     COPD (chronic obstructive pulmonary disease)     Septicemia due to enterococcus     Pneumonia     Iron deficiency anemia     Chronic hypoxemic respiratory failure     PAF (paroxysmal atrial fibrillation)        Assessment:  Melena  Anemia  Abnormal CT of the chest thickening of the distal esophagus noted  Personal history of colon polyps  Acute on chronic respiratory failure  A-fib -Eliquis on hold - last dose  4/11    Plan:  Tentative EGD and colonoscopy when patient has improved from the cardio/pulmonary standpoint  Continue twice daily dosing IV PPI therapy  Diet as tolerated  Monitor H&H and transfuse as needed per primary team    I discussed the patients findings and my recommendations with patient and family.    Flores Mays, APRN

## 2024-04-16 NOTE — PROGRESS NOTES
Name: Paz Browne ADMIT: 2024   : 1955  PCP: Javan Martinez MD    MRN: [id number] LOS: 5 days   AGE/SEX: 68 y.o. female  ROOM: [facility]     Subjective   Subjective   Laying in bed, spouse present in the room.  Patient had BM this morning, more brown in color, no signs of bleeding.  Denies abdominal pain, nausea or vomiting.  Shortness of breath improving.    Review of Systems   As above  Objective   Objective   Vital Signs  Temp:  [97.5 °F (36.4 °C)-98.1 °F (36.7 °C)] 97.9 °F (36.6 °C)  Heart Rate:  [] 85  Resp:  [18] 18  BP: (104-143)/(51-62) 104/51  SpO2:  [92 %-96 %] 96 %  on  Flow (L/min):  [4-5] 5;   Device (Oxygen Therapy): nasal cannula  Body mass index is 23.58 kg/m².  Physical Exam    General: Alert, laying in bed, not in distress, clinical appearing  HEENT: Normocephalic, atraumatic  CV: Irregular rhythm, normal rate  Lungs: Diminished bilaterally, no wheezing, nonlabored breathing, on 5 L nasal cannula  Abdomen: Soft, nontender, nondistended  Extremities: No significant peripheral edema , no cyanosis     Results Review     I reviewed the patient's new clinical results.  Results from last 7 days   Lab Units 24  0648 04/15/24  1825 04/15/24  0509 24  0550 24  0534   WBC 10*3/mm3 12.74*  --  8.48 11.01* 14.36*   HEMOGLOBIN g/dL 8.5* 9.1* 8.3* 8.7* 6.9*   PLATELETS 10*3/mm3 223  --  206 248 384     Results from last 7 days   Lab Units 24  0648 04/15/24  1649 04/15/24  0509 24  0545 24  2047 24  0550   SODIUM mmol/L 144  --  142 142  --  146*   POTASSIUM mmol/L 3.3* 3.8 3.4* 3.5   < > 3.6   CHLORIDE mmol/L 94*  --  94* 95*  --  99   CO2 mmol/L 40.7*  --  38.0* 34.3*  --  36.2*   BUN mg/dL 12  --  9 8  --  9   CREATININE mg/dL 0.73  --  0.68 0.72  --  0.78   GLUCOSE mg/dL 122*  --  128* 114*  --  102*    < > = values in this interval not displayed.   Estimated Creatinine Clearance: 77.3 mL/min (by C-G formula based on SCr of 0.73  "mg/dL).  Results from last 7 days   Lab Units 04/16/24  0648 04/13/24  0550 04/12/24  0533   ALBUMIN g/dL 3.3* 3.3* 3.5   BILIRUBIN mg/dL 0.4 0.5 0.5   ALK PHOS U/L 55 66 69   AST (SGOT) U/L 16 23 24   ALT (SGPT) U/L 11 15 17     Results from last 7 days   Lab Units 04/16/24  0648 04/15/24  0509 04/14/24  0545 04/13/24 2047 04/13/24  0550 04/12/24  0533   CALCIUM mg/dL 8.6 8.8 9.0  --  8.6 7.9*   ALBUMIN g/dL 3.3*  --   --   --  3.3* 3.5   MAGNESIUM mg/dL  --   --   --  2.0  --   --    PHOSPHORUS mg/dL 2.9  --   --  3.0  --   --      Results from last 7 days   Lab Units 04/14/24  0545 04/11/24  1938 04/11/24  1800   PROCALCITONIN ng/mL 0.05  --  0.06   LACTATE mmol/L  --  0.9  --      COVID19   Date Value Ref Range Status   04/11/2024 Not Detected Not Detected - Ref. Range Final   03/31/2024 Not Detected Not Detected - Ref. Range Final     No results found for: \"HGBA1C\", \"POCGLU\"        XR Chest 1 View  Narrative: XR CHEST 1 VW-4/14/2024     HISTORY: Shortness of breath.     Heart size is at the upper limits of normal. There is bilateral  interstitial and alveolar fluid consistent with pulmonary edema. There  could be an element of pneumonia in the lung bases. Bilateral pleural  effusions are seen. Sternotomy wires are seen. Left upper extremity PICC  line is seen in good position.     No pneumothorax is seen.     Impression: 1. FINDINGS consistent with bilateral pulmonary edema and pleural  effusions. These findings may have progressed slightly since the  4/11/2024 study.        This report was finalized on 4/14/2024 2:22 PM by Dr. Donis Guzmán M.D on Workstation: [id number]       Scheduled Medications  ampicillin, 2 g, Intravenous, Q4H  [Held by provider] apixaban, 5 mg, Oral, Q12H  atorvastatin, 40 mg, Oral, Daily  budesonide, 0.5 mg, Nebulization, BID - RT  bumetanide, 2 mg, Intravenous, Q8H  cefTRIAXone (ROCEPHIN) 2,000 mg in sodium chloride 0.9 % 100 mL MBP, 2,000 mg, Intravenous, Q12H  cetirizine, 10 " mg, Oral, Daily  cyclobenzaprine, 10 mg, Oral, Nightly  dilTIAZem, 60 mg, Oral, Q6H  methylPREDNISolone sodium succinate, 40 mg, Intravenous, Q12H  multivitamin with minerals, 1 tablet, Oral, Daily  pantoprazole, 40 mg, Intravenous, Q12H  roflumilast, 500 mcg, Oral, Daily  rOPINIRole, 2 mg, Oral, Nightly  sertraline, 100 mg, Oral, Daily  sodium chloride, 10 mL, Intravenous, Q12H  sodium chloride, 10 mL, Intravenous, Q12H  spironolactone, 25 mg, Oral, Daily    Infusions   Diet  Diet: Cardiac; Healthy Heart (2-3 Na+); Fluid Consistency: Thin (IDDSI 0)    I have personally reviewed     [x]  Laboratory   []  Microbiology   []  Radiology   [x]  EKG/Telemetry  []  Cardiology/Vascular   []  Pathology    []  Records       Assessment/Plan     Active Hospital Problems    Diagnosis  POA    **Acute on chronic diastolic congestive heart failure [I50.33]  Yes    COPD (chronic obstructive pulmonary disease) [J44.9]  Unknown    Septicemia due to enterococcus [A41.81]  Yes    Pneumonia [J18.9]  Yes    Iron deficiency anemia [D50.9]  Yes    Chronic hypoxemic respiratory failure [J96.11]  Yes    PAF (paroxysmal atrial fibrillation) [I48.0]  Yes      Resolved Hospital Problems   No resolved problems to display.       68 y.o. female with COPD, chronic hypoxic respiratory failure on 4 l nasal cannula, chronic diastolic CHF and A-fib with recent hospitalization for enterococcal bacteremia discharged with IV antibiotics now admitted with worsening dyspnea      Active chronic heart failure with preserved ejection fraction  History of MV replacement  -Echocardiogram 04/4/24 showed EF of 61 to 65%  -On IV Bumex, cardiology managing      Hypokalemia  -Potassium 3.4, replacement ordered  -Repeat potassium this afternoon, will check magnesium    Severe COPD/chronic hypoxic respiratory failure on 4 L nasal cannula at baseline  -On IV steroids, budesonide nebulizer  -Roflumilast  -Pulmonology managing  -Currently on 4-5 L nasal cannula    -      A-fib with RVR  -On diltiazem, apixaban on hold due to melena    Enterococcus septicemia  -Diagnosed during recent hospitalization from 4/2/2024 - 4/5/2024.    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       Melena/acute on chronic anemia  -Hemoglobin dropped to 6.9 on 04/12/2024, status post 1 unit of PRBC transfused  -Eliquis on hold  -Continue IV PPI  -Hemoglobin 8.5 this morning.  Stable.  -GI following, tentative plan for EGD/colonoscopy once cleared by cardiology/pulmonology        SCDs for DVT prophylaxis.  Full code.  Discussed with patient, family, and care team on multidisciplinary rounds.  Anticipate discharge home, timing yet to be determined      Copied text in this note has been reviewed and is accurate as of 04/16/24.         Dictated utilizing Dragon dictation        Arina Castaneda MD  [facility]  04/16/24  15:03 EDT

## 2024-04-17 LAB
ANION GAP SERPL CALCULATED.3IONS-SCNC: 8.9 MMOL/L (ref 5–15)
BASOPHILS # BLD AUTO: 0 10*3/MM3 (ref 0–0.2)
BASOPHILS NFR BLD AUTO: 0 % (ref 0–1.5)
BUN SERPL-MCNC: 18 MG/DL (ref 8–23)
BUN/CREAT SERPL: 22 (ref 7–25)
CALCIUM SPEC-SCNC: 8.9 MG/DL (ref 8.6–10.5)
CHLORIDE SERPL-SCNC: 92 MMOL/L (ref 98–107)
CO2 SERPL-SCNC: 41.1 MMOL/L (ref 22–29)
CREAT SERPL-MCNC: 0.82 MG/DL (ref 0.57–1)
DEPRECATED RDW RBC AUTO: 53.2 FL (ref 37–54)
DEPRECATED RDW RBC AUTO: NORMAL FL
EGFRCR SERPLBLD CKD-EPI 2021: 77.1 ML/MIN/1.73
EOSINOPHIL # BLD AUTO: 0 10*3/MM3 (ref 0–0.4)
EOSINOPHIL NFR BLD AUTO: 0 % (ref 0.3–6.2)
ERYTHROCYTE [DISTWIDTH] IN BLOOD BY AUTOMATED COUNT: 16.5 % (ref 12.3–15.4)
ERYTHROCYTE [DISTWIDTH] IN BLOOD BY AUTOMATED COUNT: NORMAL %
GLUCOSE SERPL-MCNC: 137 MG/DL (ref 65–99)
HCT VFR BLD AUTO: 28.1 % (ref 34–46.6)
HCT VFR BLD AUTO: NORMAL %
HGB BLD-MCNC: 8.5 G/DL (ref 12–15.9)
HGB BLD-MCNC: NORMAL G/DL
IMM GRANULOCYTES # BLD AUTO: 0.06 10*3/MM3 (ref 0–0.05)
IMM GRANULOCYTES NFR BLD AUTO: 0.4 % (ref 0–0.5)
LYMPHOCYTES # BLD AUTO: 0.36 10*3/MM3 (ref 0.7–3.1)
LYMPHOCYTES NFR BLD AUTO: 2.7 % (ref 19.6–45.3)
MAGNESIUM SERPL-MCNC: 2.1 MG/DL (ref 1.6–2.4)
MCH RBC QN AUTO: 26.8 PG (ref 26.6–33)
MCH RBC QN AUTO: NORMAL PG
MCHC RBC AUTO-ENTMCNC: 30.2 G/DL (ref 31.5–35.7)
MCHC RBC AUTO-ENTMCNC: NORMAL G/DL
MCV RBC AUTO: 88.6 FL (ref 79–97)
MCV RBC AUTO: NORMAL FL
MONOCYTES # BLD AUTO: 0.26 10*3/MM3 (ref 0.1–0.9)
MONOCYTES NFR BLD AUTO: 1.9 % (ref 5–12)
NEUTROPHILS NFR BLD AUTO: 12.75 10*3/MM3 (ref 1.7–7)
NEUTROPHILS NFR BLD AUTO: 95 % (ref 42.7–76)
NRBC BLD AUTO-RTO: 0 /100 WBC (ref 0–0.2)
PHOSPHATE SERPL-MCNC: 3.2 MG/DL (ref 2.5–4.5)
PHOSPHATE SERPL-MCNC: NORMAL MG/DL
PLATELET # BLD AUTO: 234 10*3/MM3 (ref 140–450)
PLATELET # BLD AUTO: NORMAL 10*3/UL
PMV BLD AUTO: 10.3 FL (ref 6–12)
PMV BLD AUTO: NORMAL FL
POTASSIUM SERPL-SCNC: 3.7 MMOL/L (ref 3.5–5.2)
RBC # BLD AUTO: 3.17 10*6/MM3 (ref 3.77–5.28)
RBC # BLD AUTO: NORMAL 10*6/UL
SODIUM SERPL-SCNC: 142 MMOL/L (ref 136–145)
WBC NRBC COR # BLD AUTO: 13.43 10*3/MM3 (ref 3.4–10.8)
WBC NRBC COR # BLD AUTO: NORMAL 10*3/UL

## 2024-04-17 PROCEDURE — 85027 COMPLETE CBC AUTOMATED: CPT | Performed by: STUDENT IN AN ORGANIZED HEALTH CARE EDUCATION/TRAINING PROGRAM

## 2024-04-17 PROCEDURE — 25010000002 ONDANSETRON PER 1 MG: Performed by: INTERNAL MEDICINE

## 2024-04-17 PROCEDURE — 25010000002 METHYLPREDNISOLONE PER 40 MG: Performed by: HOSPITALIST

## 2024-04-17 PROCEDURE — 25010000002 ACETAZOLAMIDE PER 500 MG: Performed by: INTERNAL MEDICINE

## 2024-04-17 PROCEDURE — 94799 UNLISTED PULMONARY SVC/PX: CPT

## 2024-04-17 PROCEDURE — 99232 SBSQ HOSP IP/OBS MODERATE 35: CPT | Performed by: INTERNAL MEDICINE

## 2024-04-17 PROCEDURE — 25010000002 AMPICILLIN PER 500 MG: Performed by: NURSE PRACTITIONER

## 2024-04-17 PROCEDURE — 83735 ASSAY OF MAGNESIUM: CPT | Performed by: STUDENT IN AN ORGANIZED HEALTH CARE EDUCATION/TRAINING PROGRAM

## 2024-04-17 PROCEDURE — 99232 SBSQ HOSP IP/OBS MODERATE 35: CPT | Performed by: NURSE PRACTITIONER

## 2024-04-17 PROCEDURE — 80048 BASIC METABOLIC PNL TOTAL CA: CPT | Performed by: STUDENT IN AN ORGANIZED HEALTH CARE EDUCATION/TRAINING PROGRAM

## 2024-04-17 PROCEDURE — 84100 ASSAY OF PHOSPHORUS: CPT | Performed by: STUDENT IN AN ORGANIZED HEALTH CARE EDUCATION/TRAINING PROGRAM

## 2024-04-17 PROCEDURE — 94761 N-INVAS EAR/PLS OXIMETRY MLT: CPT

## 2024-04-17 PROCEDURE — 25010000002 CEFTRIAXONE PER 250 MG: Performed by: INTERNAL MEDICINE

## 2024-04-17 PROCEDURE — 97530 THERAPEUTIC ACTIVITIES: CPT

## 2024-04-17 PROCEDURE — 85025 COMPLETE CBC W/AUTO DIFF WBC: CPT | Performed by: STUDENT IN AN ORGANIZED HEALTH CARE EDUCATION/TRAINING PROGRAM

## 2024-04-17 PROCEDURE — 94760 N-INVAS EAR/PLS OXIMETRY 1: CPT

## 2024-04-17 PROCEDURE — 94664 DEMO&/EVAL PT USE INHALER: CPT

## 2024-04-17 PROCEDURE — 25010000002 BUMETANIDE PER 0.5 MG: Performed by: INTERNAL MEDICINE

## 2024-04-17 RX ORDER — POTASSIUM CHLORIDE 750 MG/1
40 TABLET, FILM COATED, EXTENDED RELEASE ORAL ONCE
Status: COMPLETED | OUTPATIENT
Start: 2024-04-17 | End: 2024-04-17

## 2024-04-17 RX ORDER — ACETAZOLAMIDE 500 MG/5ML
250 INJECTION, POWDER, LYOPHILIZED, FOR SOLUTION INTRAVENOUS EVERY 8 HOURS
Qty: 1000 MG | Refills: 0 | Status: COMPLETED | OUTPATIENT
Start: 2024-04-17 | End: 2024-04-18

## 2024-04-17 RX ORDER — HYDROCODONE BITARTRATE AND ACETAMINOPHEN 7.5; 325 MG/1; MG/1
1 TABLET ORAL EVERY 6 HOURS PRN
Status: DISCONTINUED | OUTPATIENT
Start: 2024-04-17 | End: 2024-04-21 | Stop reason: HOSPADM

## 2024-04-17 RX ADMIN — ATORVASTATIN CALCIUM 40 MG: 20 TABLET, FILM COATED ORAL at 08:34

## 2024-04-17 RX ADMIN — CYCLOBENZAPRINE 10 MG: 10 TABLET, FILM COATED ORAL at 21:15

## 2024-04-17 RX ADMIN — SERTRALINE 100 MG: 100 TABLET, FILM COATED ORAL at 08:34

## 2024-04-17 RX ADMIN — ONDANSETRON 4 MG: 2 INJECTION INTRAMUSCULAR; INTRAVENOUS at 21:34

## 2024-04-17 RX ADMIN — CETIRIZINE HYDROCHLORIDE 10 MG: 10 TABLET ORAL at 08:34

## 2024-04-17 RX ADMIN — BUMETANIDE 2 MG: 0.25 INJECTION, SOLUTION INTRAMUSCULAR; INTRAVENOUS at 16:56

## 2024-04-17 RX ADMIN — DILTIAZEM HYDROCHLORIDE 60 MG: 60 TABLET, FILM COATED ORAL at 09:13

## 2024-04-17 RX ADMIN — HYDROCODONE BITARTRATE AND ACETAMINOPHEN 1 TABLET: 7.5; 325 TABLET ORAL at 21:34

## 2024-04-17 RX ADMIN — POTASSIUM CHLORIDE 40 MEQ: 750 TABLET, EXTENDED RELEASE ORAL at 09:13

## 2024-04-17 RX ADMIN — AMPICILLIN SODIUM 2 G: 2 INJECTION, POWDER, FOR SOLUTION INTRAVENOUS at 16:56

## 2024-04-17 RX ADMIN — AMPICILLIN SODIUM 2 G: 2 INJECTION, POWDER, FOR SOLUTION INTRAVENOUS at 11:57

## 2024-04-17 RX ADMIN — HYDROCODONE BITARTRATE AND ACETAMINOPHEN 1 TABLET: 7.5; 325 TABLET ORAL at 13:51

## 2024-04-17 RX ADMIN — NYSTATIN 500000 UNITS: 100000 SUSPENSION ORAL at 18:39

## 2024-04-17 RX ADMIN — PANTOPRAZOLE SODIUM 40 MG: 40 INJECTION, POWDER, FOR SOLUTION INTRAVENOUS at 21:18

## 2024-04-17 RX ADMIN — DILTIAZEM HYDROCHLORIDE 60 MG: 60 TABLET, FILM COATED ORAL at 21:15

## 2024-04-17 RX ADMIN — CEFTRIAXONE 2000 MG: 2 INJECTION, POWDER, FOR SOLUTION INTRAMUSCULAR; INTRAVENOUS at 21:16

## 2024-04-17 RX ADMIN — ROPINIROLE 2 MG: 2 TABLET, FILM COATED ORAL at 21:15

## 2024-04-17 RX ADMIN — AMPICILLIN SODIUM 2 G: 2 INJECTION, POWDER, FOR SOLUTION INTRAVENOUS at 03:47

## 2024-04-17 RX ADMIN — BUDESONIDE 0.5 MG: 0.5 INHALANT RESPIRATORY (INHALATION) at 08:24

## 2024-04-17 RX ADMIN — AMPICILLIN SODIUM 2 G: 2 INJECTION, POWDER, FOR SOLUTION INTRAVENOUS at 08:34

## 2024-04-17 RX ADMIN — HYDROCODONE BITARTRATE AND ACETAMINOPHEN 1 TABLET: 7.5; 325 TABLET ORAL at 05:46

## 2024-04-17 RX ADMIN — METHYLPREDNISOLONE SODIUM SUCCINATE 40 MG: 40 INJECTION, POWDER, FOR SOLUTION INTRAMUSCULAR; INTRAVENOUS at 03:46

## 2024-04-17 RX ADMIN — ACETAMINOPHEN 650 MG: 325 TABLET, FILM COATED ORAL at 03:46

## 2024-04-17 RX ADMIN — NYSTATIN 500000 UNITS: 100000 SUSPENSION ORAL at 21:18

## 2024-04-17 RX ADMIN — IPRATROPIUM BROMIDE AND ALBUTEROL SULFATE 3 ML: .5; 3 SOLUTION RESPIRATORY (INHALATION) at 08:21

## 2024-04-17 RX ADMIN — PANTOPRAZOLE SODIUM 40 MG: 40 INJECTION, POWDER, FOR SOLUTION INTRAVENOUS at 08:35

## 2024-04-17 RX ADMIN — ACETAZOLAMIDE SODIUM 250 MG: 500 INJECTION, POWDER, LYOPHILIZED, FOR SOLUTION INTRAVENOUS at 18:34

## 2024-04-17 RX ADMIN — ALBUTEROL SULFATE 2.5 MG: 2.5 SOLUTION RESPIRATORY (INHALATION) at 19:23

## 2024-04-17 RX ADMIN — AMPICILLIN SODIUM 2 G: 2 INJECTION, POWDER, FOR SOLUTION INTRAVENOUS at 00:16

## 2024-04-17 RX ADMIN — CEFTRIAXONE 2000 MG: 2 INJECTION, POWDER, FOR SOLUTION INTRAMUSCULAR; INTRAVENOUS at 09:13

## 2024-04-17 RX ADMIN — DILTIAZEM HYDROCHLORIDE 60 MG: 60 TABLET, FILM COATED ORAL at 03:46

## 2024-04-17 RX ADMIN — Medication 1 TABLET: at 08:35

## 2024-04-17 RX ADMIN — BUDESONIDE 0.5 MG: 0.5 INHALANT RESPIRATORY (INHALATION) at 19:23

## 2024-04-17 RX ADMIN — AMPICILLIN SODIUM 2 G: 2 INJECTION, POWDER, FOR SOLUTION INTRAVENOUS at 21:18

## 2024-04-17 RX ADMIN — IPRATROPIUM BROMIDE AND ALBUTEROL SULFATE 3 ML: .5; 3 SOLUTION RESPIRATORY (INHALATION) at 04:20

## 2024-04-17 RX ADMIN — BUMETANIDE 2 MG: 0.25 INJECTION, SOLUTION INTRAMUSCULAR; INTRAVENOUS at 08:35

## 2024-04-17 RX ADMIN — METHYLPREDNISOLONE SODIUM SUCCINATE 40 MG: 40 INJECTION, POWDER, FOR SOLUTION INTRAMUSCULAR; INTRAVENOUS at 16:56

## 2024-04-17 RX ADMIN — Medication 3 MG: at 21:34

## 2024-04-17 RX ADMIN — DILTIAZEM HYDROCHLORIDE 60 MG: 60 TABLET, FILM COATED ORAL at 16:56

## 2024-04-17 RX ADMIN — ROFLUMILAST 500 MCG: 500 TABLET ORAL at 08:35

## 2024-04-17 RX ADMIN — Medication 10 ML: at 21:18

## 2024-04-17 RX ADMIN — BUMETANIDE 2 MG: 0.25 INJECTION, SOLUTION INTRAMUSCULAR; INTRAVENOUS at 00:15

## 2024-04-17 RX ADMIN — SPIRONOLACTONE 25 MG: 25 TABLET, FILM COATED ORAL at 08:35

## 2024-04-17 NOTE — PROGRESS NOTES
Name: Paz Browne ADMIT: 2024   : 1955  PCP: Javan Martinez MD    MRN: [id number] LOS: 6 days   AGE/SEX: 68 y.o. female  ROOM: [facility]     Subjective   Subjective   No events overnight.  Feeling better, shortness of breath improving.  Had BM, normal in color, no signs of bleeding.    Review of Systems   As above  Objective   Objective   Vital Signs  Temp:  [97.7 °F (36.5 °C)-98.2 °F (36.8 °C)] 98.2 °F (36.8 °C)  Heart Rate:  [] 107  Resp:  [18-20] 18  BP: (117-136)/(57-79) 136/70  SpO2:  [92 %-100 %] 94 %  on  Flow (L/min):  [3-5] 3;   Device (Oxygen Therapy): nasal cannula;humidified  Body mass index is 23.72 kg/m².  Physical Exam    General: Alert, laying in bed, not in distress.  HEENT: Normocephalic, atraumatic  CV: Irregular rhythm, tachycardic  Lungs: Decreased, no wheezing, on 3 L nasal cannula  Abdomen: Soft, nontender, nondistended  Extremities: No significant peripheral edema , no cyanosis     Results Review     I reviewed the patient's new clinical results.  Results from last 7 days   Lab Units 24  0717 24  0648 04/15/24  1825 04/15/24  0509 24  0550   WBC 10*3/mm3 13.43* 12.74*  --  8.48 11.01*   HEMOGLOBIN g/dL 8.5* 8.5* 9.1* 8.3* 8.7*   PLATELETS 10*3/mm3 234 223  --  206 248     Results from last 7 days   Lab Units 24  0717 24  0648 04/15/24  1649 04/15/24  0509 24  0545   SODIUM mmol/L 142 144  --  142 142   POTASSIUM mmol/L 3.7 3.3* 3.8 3.4* 3.5   CHLORIDE mmol/L 92* 94*  --  94* 95*   CO2 mmol/L 41.1* 40.7*  --  38.0* 34.3*   BUN mg/dL 18 12  --  9 8   CREATININE mg/dL 0.82 0.73  --  0.68 0.72   GLUCOSE mg/dL 137* 122*  --  128* 114*   Estimated Creatinine Clearance: 69.2 mL/min (by C-G formula based on SCr of 0.82 mg/dL).  Results from last 7 days   Lab Units 24  0648 24  0550 24  0533   ALBUMIN g/dL 3.3* 3.3* 3.5   BILIRUBIN mg/dL 0.4 0.5 0.5   ALK PHOS U/L 55 66 69   AST (SGOT) U/L 16 23 24   ALT (SGPT) U/L 11 15  "17     Results from last 7 days   Lab Units 04/17/24  0717 04/16/24  0648 04/15/24  0509 04/14/24  0545 04/13/24  2047 04/13/24  0550 04/12/24  0533   CALCIUM mg/dL 8.9 8.6 8.8 9.0  --  8.6 7.9*   ALBUMIN g/dL  --  3.3*  --   --   --  3.3* 3.5   MAGNESIUM mg/dL 2.1  --   --   --  2.0  --   --    PHOSPHORUS mg/dL 3.2 2.9  --   --  3.0  --   --      Results from last 7 days   Lab Units 04/14/24  0545 04/11/24  1938 04/11/24  1800   PROCALCITONIN ng/mL 0.05  --  0.06   LACTATE mmol/L  --  0.9  --      COVID19   Date Value Ref Range Status   04/11/2024 Not Detected Not Detected - Ref. Range Final   03/31/2024 Not Detected Not Detected - Ref. Range Final     No results found for: \"HGBA1C\", \"POCGLU\"        XR Chest 1 View  Narrative: XR CHEST 1 VW-4/14/2024     HISTORY: Shortness of breath.     Heart size is at the upper limits of normal. There is bilateral  interstitial and alveolar fluid consistent with pulmonary edema. There  could be an element of pneumonia in the lung bases. Bilateral pleural  effusions are seen. Sternotomy wires are seen. Left upper extremity PICC  line is seen in good position.     No pneumothorax is seen.     Impression: 1. FINDINGS consistent with bilateral pulmonary edema and pleural  effusions. These findings may have progressed slightly since the  4/11/2024 study.        This report was finalized on 4/14/2024 2:22 PM by Dr. Donis Guzmán M.D on Workstation: [id number]       Scheduled Medications  acetaZOLAMIDE, 250 mg, Intravenous, Q8H  ampicillin, 2 g, Intravenous, Q4H  [Held by provider] apixaban, 5 mg, Oral, Q12H  atorvastatin, 40 mg, Oral, Daily  budesonide, 0.5 mg, Nebulization, BID - RT  bumetanide, 2 mg, Intravenous, Q8H  cefTRIAXone (ROCEPHIN) 2,000 mg in sodium chloride 0.9 % 100 mL MBP, 2,000 mg, Intravenous, Q12H  cetirizine, 10 mg, Oral, Daily  cyclobenzaprine, 10 mg, Oral, Nightly  dilTIAZem, 60 mg, Oral, Q6H  methylPREDNISolone sodium succinate, 40 mg, Intravenous, " Q12H  multivitamin with minerals, 1 tablet, Oral, Daily  nystatin, 5 mL, Oral, 4x Daily  pantoprazole, 40 mg, Intravenous, Q12H  roflumilast, 500 mcg, Oral, Daily  rOPINIRole, 2 mg, Oral, Nightly  sertraline, 100 mg, Oral, Daily  sodium chloride, 10 mL, Intravenous, Q12H  sodium chloride, 10 mL, Intravenous, Q12H  spironolactone, 25 mg, Oral, Daily    Infusions   Diet  Diet: Cardiac, Fluid Restriction (240 mL/tray); Healthy Heart (2-3 Na+); 1500 mL/day; Fluid Consistency: Thin (IDDSI 0)    I have personally reviewed     [x]  Laboratory   []  Microbiology   []  Radiology   []  EKG/Telemetry  []  Cardiology/Vascular   []  Pathology    []  Records       Assessment/Plan     Active Hospital Problems    Diagnosis  POA    **Acute on chronic diastolic congestive heart failure [I50.33]  Yes    COPD (chronic obstructive pulmonary disease) [J44.9]  Unknown    Septicemia due to enterococcus [A41.81]  Yes    Pneumonia [J18.9]  Yes    Iron deficiency anemia [D50.9]  Yes    Chronic hypoxemic respiratory failure [J96.11]  Yes    PAF (paroxysmal atrial fibrillation) [I48.0]  Yes      Resolved Hospital Problems   No resolved problems to display.       68 y.o. female with COPD, chronic hypoxic respiratory failure on 4 l nasal cannula, chronic diastolic CHF and A-fib with recent hospitalization for enterococcal bacteremia discharged with IV antibiotics now admitted with worsening dyspnea      Active chronic heart failure with preserved ejection fraction  History of MV replacement  -Echocardiogram 04/4/24 showed EF of 61 to 65%  -On IV Bumex, cardiology managing        Hypokalemia  -Potassium improved from 3.7.  -Ordered additional p.o. potassium replacement with 40 mEq and patient replaced on IV Bumex      Severe COPD/chronic hypoxic respiratory failure on 3- 4 L nasal cannula at baseline  -On IV steroids, budesonide nebulizer  -Roflumilast  -Pulmonology managing  -Oxygen requirements improving  -      A-fib with RVR  -On diltiazem,  apixaban on hold due to melena    Enterococcus septicemia  -Diagnosed during recent hospitalization from 4/2/2024 - 4/5/2024.    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  At discharge ampicillin dosing to be  changed to 12 g every 24 hours via continuous infusion.  -WBC trending up, but likely due to steroids.      Melena/acute on chronic anemia  -Hemoglobin dropped to 6.9 on 04/12/2024, status post 1 unit of PRBC transfused  -Eliquis on hold  -Continue IV PPI  -Hemoglobin stable around 8.5  -GI following, tentative plan for EGD/colonoscopy once cleared by cardiology/pulmonology standpoint        SCDs for DVT prophylaxis.  Full code.  Discussed with patient, family, and care team on multidisciplinary rounds.  Anticipate discharge home, timing yet to be determined      Copied text in this note has been reviewed and is accurate as of 04/17/24.         Dictated utilizing Dragon dictation        Arina Castaneda MD  [facility]  04/17/24  17:56 EDT

## 2024-04-17 NOTE — PROGRESS NOTES
"   LOS: 6 days   Patient Care Team:  Javan Martinez MD as PCP - General (Internal Medicine)  Ag Melo Jr., MD as Consulting Physician (Pulmonary Disease)  Cleveland Devine MD as Consulting Physician (Gastroenterology)  Earnest Gan MD as Consulting Physician (Cardiology)  Daniela Shin MD PhD as Consulting Physician (Hematology and Oncology)    Chief Complaint: SOA     Interval History: Slowly improving.       Objective   Vital Signs  Temp:  [97.7 °F (36.5 °C)-98.2 °F (36.8 °C)] 98.2 °F (36.8 °C)  Heart Rate:  [] 107  Resp:  [18-20] 18  BP: (117-136)/(55-79) 136/70    Intake/Output Summary (Last 24 hours) at 4/17/2024 1434  Last data filed at 4/17/2024 1351  Gross per 24 hour   Intake 400 ml   Output 1900 ml   Net -1500 ml       Last Weight and Admission Weight        04/17/24  0500   Weight: 68.7 kg (151 lb 7.3 oz)     Flowsheet Rows      Flowsheet Row First Filed Value   Admission Height 170.2 cm (67\") Documented at 04/11/2024 1752   Admission Weight 59 kg (130 lb) Documented at 04/11/2024 1752            Physical Exam  HENT:      Head: Normocephalic.      Mouth/Throat:      Pharynx: Oropharynx is clear.   Eyes:      Conjunctiva/sclera: Conjunctivae normal.   Cardiovascular:      Rate and Rhythm: Normal rate. Rhythm irregular.   Pulmonary:      Comments: Poor air movement, very reduced at ases  Abdominal:      Palpations: Abdomen is soft.      Tenderness: There is no abdominal tenderness.   Musculoskeletal:         General: No swelling.   Neurological:      General: No focal deficit present.       Results Review:      Results from last 7 days   Lab Units 04/17/24  0717 04/16/24  0648 04/15/24  1649 04/15/24  0509   SODIUM mmol/L 142 144  --  142   POTASSIUM mmol/L 3.7 3.3* 3.8 3.4*   CHLORIDE mmol/L 92* 94*  --  94*   CO2 mmol/L 41.1* 40.7*  --  38.0*   BUN mg/dL 18 12  --  9   CREATININE mg/dL 0.82 0.73  --  0.68   GLUCOSE mg/dL 137* 122*  --  128*   CALCIUM mg/dL 8.9 8.6  --  8.8       "   Results from last 7 days   Lab Units 04/17/24  0717 04/16/24  0648 04/15/24  1825 04/15/24  0509   WBC 10*3/mm3 13.43* 12.74*  --  8.48   HEMOGLOBIN g/dL 8.5* 8.5* 9.1* 8.3*   HEMATOCRIT % 28.1* 27.3* 29.2* 26.6*   PLATELETS 10*3/mm3 234 223  --  206             Results from last 7 days   Lab Units 04/17/24  0717   MAGNESIUM mg/dL 2.1           I reviewed the patient's new clinical results.  I personally viewed and interpreted the patient's EKG/Telemetry data        Medication Review:   ampicillin, 2 g, Intravenous, Q4H  [Held by provider] apixaban, 5 mg, Oral, Q12H  atorvastatin, 40 mg, Oral, Daily  budesonide, 0.5 mg, Nebulization, BID - RT  bumetanide, 2 mg, Intravenous, Q8H  cefTRIAXone (ROCEPHIN) 2,000 mg in sodium chloride 0.9 % 100 mL MBP, 2,000 mg, Intravenous, Q12H  cetirizine, 10 mg, Oral, Daily  cyclobenzaprine, 10 mg, Oral, Nightly  dilTIAZem, 60 mg, Oral, Q6H  methylPREDNISolone sodium succinate, 40 mg, Intravenous, Q12H  multivitamin with minerals, 1 tablet, Oral, Daily  nystatin, 5 mL, Oral, 4x Daily  pantoprazole, 40 mg, Intravenous, Q12H  roflumilast, 500 mcg, Oral, Daily  rOPINIRole, 2 mg, Oral, Nightly  sertraline, 100 mg, Oral, Daily  sodium chloride, 10 mL, Intravenous, Q12H  sodium chloride, 10 mL, Intravenous, Q12H  spironolactone, 25 mg, Oral, Daily         Assessment & Plan     1. Acute on chronic HFpEF  2. PAF  3. Chronic hypoxemic respiratory failure/COPD  4. Enterococcus bacteremia  5. History of MV replacement  6. Anemia requiring transfusion    *O2 requirement is returning to baseline, she remains SOA but definitely feels better with diuresis, and clinically she is improving. Her weights are just not correct, I don't feel.     *Encouraged fluid restriction -- there is a lot of soda in the room.    *She's getting a bit more alkalotic than baseline (she compensates for chronic COPD), will continue current dose of IV bumetanide but will give acetazolamide today and consider de-escalation  tomorrow. Consider spironolactone.     *HR is reasonably controlled on diltiazem, avoiding BB due to lung disease, continue apixaban.     *Getting closer to being able to undergo colonoscopy with intermediate risk (she will never be low risk).     Rodolfo Null MD  04/17/24  14:34 EDT

## 2024-04-17 NOTE — PLAN OF CARE
Problem: Adult Inpatient Plan of Care  Goal: Plan of Care Review  Outcome: Ongoing, Not Progressing  Flowsheets (Taken 4/17/2024 1740)  Plan of Care Reviewed With: patient  Outcome Evaluation: O2 weaned to 3L NC. Pt states improvement in SOA. Up to chair for several hours. Diuresing well. Educated re: heart failure, low NA diet, and addition of 1500ml fluid restriction. PICC CDI - IV abx cont.  Goal: Patient-Specific Goal (Individualized)  Outcome: Ongoing, Not Progressing  Goal: Absence of Hospital-Acquired Illness or Injury  Outcome: Ongoing, Not Progressing  Intervention: Identify and Manage Fall Risk  Recent Flowsheet Documentation  Taken 4/17/2024 1630 by Trish Moore RN  Safety Promotion/Fall Prevention:   fall prevention program maintained   safety round/check completed  Taken 4/17/2024 1420 by Trish Moore RN  Safety Promotion/Fall Prevention:   fall prevention program maintained   safety round/check completed  Taken 4/17/2024 1200 by Trish Moore RN  Safety Promotion/Fall Prevention:   fall prevention program maintained   safety round/check completed  Taken 4/17/2024 1030 by Trish Moore RN  Safety Promotion/Fall Prevention:   fall prevention program maintained   safety round/check completed  Taken 4/17/2024 0844 by Trish Moore RN  Safety Promotion/Fall Prevention:   fall prevention program maintained   safety round/check completed  Goal: Optimal Comfort and Wellbeing  Outcome: Ongoing, Not Progressing  Intervention: Monitor Pain and Promote Comfort  Recent Flowsheet Documentation  Taken 4/17/2024 1420 by Trish Moore RN  Pain Management Interventions: see MAR  Goal: Readiness for Transition of Care  Outcome: Ongoing, Not Progressing     Problem: Adjustment to Illness (Sepsis/Septic Shock)  Goal: Optimal Coping  Outcome: Ongoing, Not Progressing     Problem: Bleeding (Sepsis/Septic Shock)  Goal: Absence of Bleeding  Outcome: Ongoing, Not Progressing     Problem:  Glycemic Control Impaired (Sepsis/Septic Shock)  Goal: Blood Glucose Level Within Desired Range  Outcome: Ongoing, Not Progressing     Problem: Infection Progression (Sepsis/Septic Shock)  Goal: Absence of Infection Signs and Symptoms  Outcome: Ongoing, Not Progressing     Problem: Nutrition Impaired (Sepsis/Septic Shock)  Goal: Optimal Nutrition Intake  Outcome: Ongoing, Not Progressing     Problem: Heart Failure Comorbidity  Goal: Maintenance of Heart Failure Symptom Control  Outcome: Ongoing, Not Progressing     Problem: Hypertension Comorbidity  Goal: Blood Pressure in Desired Range  Outcome: Ongoing, Not Progressing     Problem: Pain Chronic (Persistent) (Comorbidity Management)  Goal: Acceptable Pain Control and Functional Ability  Outcome: Ongoing, Not Progressing  Intervention: Develop Pain Management Plan  Recent Flowsheet Documentation  Taken 4/17/2024 1420 by Trish Moore RN  Pain Management Interventions: see MAR     Problem: Skin Injury Risk Increased  Goal: Skin Health and Integrity  Outcome: Ongoing, Not Progressing     Problem: Fall Injury Risk  Goal: Absence of Fall and Fall-Related Injury  Outcome: Ongoing, Not Progressing  Intervention: Promote Injury-Free Environment  Recent Flowsheet Documentation  Taken 4/17/2024 1630 by Trish Moore RN  Safety Promotion/Fall Prevention:   fall prevention program maintained   safety round/check completed  Taken 4/17/2024 1420 by Trish Moore RN  Safety Promotion/Fall Prevention:   fall prevention program maintained   safety round/check completed  Taken 4/17/2024 1200 by Trish Moore RN  Safety Promotion/Fall Prevention:   fall prevention program maintained   safety round/check completed  Taken 4/17/2024 1030 by Trish Moore RN  Safety Promotion/Fall Prevention:   fall prevention program maintained   safety round/check completed  Taken 4/17/2024 0844 by Trish Moore RN  Safety Promotion/Fall Prevention:   fall prevention  program maintained   safety round/check completed   Goal Outcome Evaluation:  Plan of Care Reviewed With: patient        Progress: improving  Outcome Evaluation: O2 weaned to 3L NC. Pt states improvement in SOA. Up to chair for several hours. Diuresing well. Educated re: heart failure, low NA diet, and addition of 1500ml fluid restriction. PICC CDI - IV abx cont.

## 2024-04-17 NOTE — PROGRESS NOTES
[facility]  Inpatient Progress Note    Reason for Follow Up: Melena    Subjective     Interval History:     No acute events overnight.  Passed a formed dark brown BM this morning without bright red blood per rectum.  Tolerating diet well.  She feels less short of breath today.    Hemoglobin this morning stable at 8.5.    Tentative EGD and colonoscopy when she has improved from a cardiopulmonary standpoint.       Current Facility-Administered Medications:     acetaminophen (TYLENOL) tablet 650 mg, 650 mg, Oral, Q4H PRN, Susanne Bello MD, 650 mg at 04/17/24 0346    albuterol (PROVENTIL) nebulizer solution 0.083% 2.5 mg/3mL, 2.5 mg, Nebulization, Q6H PRN, Susanne Bello MD, 2.5 mg at 04/15/24 1922    ampicillin 2000 mg IVPB in 100 mL NS (MBP), 2 g, Intravenous, Q4H, Hanna Navraez APRN, Last Rate: 200 mL/hr at 04/17/24 0834, 2 g at 04/17/24 0834    [Held by provider] apixaban (ELIQUIS) tablet 5 mg, 5 mg, Oral, Q12H, Susanne Bello MD, 5 mg at 04/11/24 2351    atorvastatin (LIPITOR) tablet 40 mg, 40 mg, Oral, Daily, Susanne Bello MD, 40 mg at 04/17/24 0834    sennosides-docusate (PERICOLACE) 8.6-50 MG per tablet 2 tablet, 2 tablet, Oral, BID PRN **AND** polyethylene glycol (MIRALAX) packet 17 g, 17 g, Oral, Daily PRN **AND** bisacodyl (DULCOLAX) EC tablet 5 mg, 5 mg, Oral, Daily PRN **AND** bisacodyl (DULCOLAX) suppository 10 mg, 10 mg, Rectal, Daily PRN, Susanne Bello MD    budesonide (PULMICORT) nebulizer solution 0.5 mg, 0.5 mg, Nebulization, BID - RT, Susanne Bello MD, 0.5 mg at 04/17/24 0824    bumetanide (BUMEX) injection 2 mg, 2 mg, Intravenous, Q8H, Rodolfo Null MD, 2 mg at 04/17/24 0835    Calcium Replacement - Follow Nurse / BPA Driven Protocol, , Does not apply, PRN, Susanne Bello MD    cefTRIAXone (ROCEPHIN) 2,000 mg in sodium chloride 0.9 % 100 mL MBP, 2,000 mg, Intravenous, Q12H, Susanne Bello MD, Last  Rate: 200 mL/hr at 04/17/24 0913, 2,000 mg at 04/17/24 0913    cetirizine (zyrTEC) tablet 10 mg, 10 mg, Oral, Daily, Susanne Bello MD, 10 mg at 04/17/24 0834    cyclobenzaprine (FLEXERIL) tablet 10 mg, 10 mg, Oral, Nightly, Susanne Bello MD, 10 mg at 04/16/24 2053    dilTIAZem (CARDIZEM) tablet 60 mg, 60 mg, Oral, Q6H, Jeremiah Saab MD, 60 mg at 04/17/24 0913    diphenhydrAMINE (BENADRYL) capsule 25 mg, 25 mg, Oral, Once PRN, Ronald Marie MD    HYDROcodone-acetaminophen (NORCO) 7.5-325 MG per tablet 1 tablet, 1 tablet, Oral, Q6H PRN, Trish Graham APRN, 1 tablet at 04/17/24 0546    ipratropium-albuterol (DUO-NEB) nebulizer solution 3 mL, 3 mL, Nebulization, Q4H PRN, Susanne Bello MD, 3 mL at 04/17/24 0821    Magnesium Standard Dose Replacement - Follow Nurse / BPA Driven Protocol, , Does not apply, PRN, Susanne Bello MD    melatonin tablet 3 mg, 3 mg, Oral, Nightly PRN, Susanne Bello MD, 3 mg at 04/16/24 2053    methylPREDNISolone sodium succinate (SOLU-Medrol) injection 40 mg, 40 mg, Intravenous, Q12H, Jose Luis Conteh MD, 40 mg at 04/17/24 0346    multivitamin with minerals 1 tablet, 1 tablet, Oral, Daily, Susanne Bello MD, 1 tablet at 04/17/24 0835    ondansetron ODT (ZOFRAN-ODT) disintegrating tablet 4 mg, 4 mg, Oral, Q6H PRN, 4 mg at 04/16/24 0920 **OR** ondansetron (ZOFRAN) injection 4 mg, 4 mg, Intravenous, Q6H PRN, Susanne Bello MD, 4 mg at 04/16/24 2053    pantoprazole (PROTONIX) injection 40 mg, 40 mg, Intravenous, Q12H, Jose Luis Conteh MD, 40 mg at 04/17/24 0835    Phosphorus Replacement - Follow Nurse / BPA Driven Protocol, , Does not apply, Eugenia SHARP Renate Andrea, MD    Potassium Replacement - Follow Nurse / BPA Driven Protocol, , Does not apply, Eugenia SHARP Renate Andrea, MD    roflumilast (DALIRESP) tablet 500 mcg, 500 mcg, Oral, Daily, Susanne Bello MD, 500 mcg at 04/17/24 0835     rOPINIRole (REQUIP) tablet 2 mg, 2 mg, Oral, Nightly, Susanne Bello MD, 2 mg at 04/16/24 2053    sertraline (ZOLOFT) tablet 100 mg, 100 mg, Oral, Daily, Susanne Bello MD, 100 mg at 04/17/24 0834    sodium chloride 0.9 % flush 10 mL, 10 mL, Intravenous, Q12H, Jose Luis Conteh MD, 10 mL at 04/16/24 2054    sodium chloride 0.9 % flush 10 mL, 10 mL, Intravenous, Q12H, Jose Luis Conteh MD, 10 mL at 04/16/24 2054    sodium chloride 0.9 % flush 10 mL, 10 mL, Intravenous, PRN, Jose Luis Conteh MD    sodium chloride 0.9 % flush 20 mL, 20 mL, Intravenous, PRN, Jose Luis Conteh MD    sodium chloride 0.9 % infusion 40 mL, 40 mL, Intravenous, PRN, Jose Luis Conteh MD    sodium chloride nasal spray 2 spray, 2 spray, Each Nare, PRN, Jose Luis Conteh MD    spironolactone (ALDACTONE) tablet 25 mg, 25 mg, Oral, Daily, Jeremiah Saab MD, 25 mg at 04/17/24 0835  Review of Systems:    The following systems were reviewed and negative;  gastrointestinal    Objective     Vital Signs  Temp:  [97.7 °F (36.5 °C)-98.1 °F (36.7 °C)] 98.1 °F (36.7 °C)  Heart Rate:  [] 88  Resp:  [18-20] 20  BP: (104-136)/(51-79) 136/57  Body mass index is 23.72 kg/m².    Intake/Output Summary (Last 24 hours) at 4/17/2024 0918  Last data filed at 4/17/2024 0329  Gross per 24 hour   Intake --   Output 3200 ml   Net -3200 ml     No intake/output data recorded.     Physical Exam:   General: patient awake, alert and cooperative   Abdomen: soft, nontender, nondistended; normal bowel sounds      Results Review:     I reviewed the patient's new clinical results.    Results from last 7 days   Lab Units 04/17/24  0717 04/16/24  0648 04/15/24  1825 04/15/24  0509   WBC 10*3/mm3 13.43* 12.74*  --  8.48   HEMOGLOBIN g/dL 8.5* 8.5* 9.1* 8.3*   HEMATOCRIT % 28.1* 27.3* 29.2* 26.6*   PLATELETS 10*3/mm3 234 223  --  206     Results from last 7 days   Lab Units 04/17/24  0717 04/16/24  0648  04/15/24  1649 04/15/24  0509 04/13/24  2047 04/13/24  0550 04/12/24  1049 04/12/24  0533   SODIUM mmol/L 142 144  --  142   < > 146*  --  144   POTASSIUM mmol/L 3.7 3.3* 3.8 3.4*   < > 3.6   < > 3.6   CHLORIDE mmol/L 92* 94*  --  94*   < > 99  --  102   CO2 mmol/L 41.1* 40.7*  --  38.0*   < > 36.2*  --  33.4*   BUN mg/dL 18 12  --  9   < > 9  --  8   CREATININE mg/dL 0.82 0.73  --  0.68   < > 0.78  --  0.82   CALCIUM mg/dL 8.9 8.6  --  8.8   < > 8.6  --  7.9*   BILIRUBIN mg/dL  --  0.4  --   --   --  0.5  --  0.5   ALK PHOS U/L  --  55  --   --   --  66  --  69   ALT (SGPT) U/L  --  11  --   --   --  15  --  17   AST (SGOT) U/L  --  16  --   --   --  23  --  24   GLUCOSE mg/dL 137* 122*  --  128*   < > 102*  --  126*    < > = values in this interval not displayed.         Lab Results   Lab Value Date/Time    LIPASE 30 06/09/2023 0445    LIPASE 46 08/13/2017 0449       Radiology:  XR Chest 1 View   Final Result   1. FINDINGS consistent with bilateral pulmonary edema and pleural   effusions. These findings may have progressed slightly since the   4/11/2024 study.           This report was finalized on 4/14/2024 2:22 PM by Dr. Donis Guzmán M.D on Workstation: [id number]          CT Chest Without Contrast Diagnostic   Final Result   1.  Small bilateral pleural effusions with bibasilar pulmonary   pacification, right greater than left. Some areas have a somewhat   nodular appearance. Findings are most suggestive of multifocal   pneumonia, possibly aspiration, and correlation with patient history is   recommended to determine most appropriate etiology. Given the nodular   appearance, follow-up with chest CT in 6 to 8 weeks is recommended to   ensure resolution and exclude the possibility of malignancy. New   mediastinal adenopathy enlarged since three 224 can be followed at this   time as well.   2.  Thickening of distal esophagus suggestive of esophagitis in   appropriate context. Correlation with patient history  is recommended   with follow-up endoscopy if clinically indicated.   3.  Severe emphysema.   4.  Other findings as above.               This report was finalized on 4/12/2024 5:56 PM by Dr. Papo De La Torre M.D   on Workstation: [id number]          XR Chest 1 View   Final Result   Findings impression:   Background interstitial thickening is present throughout the bilateral   lungs with many areas demonstrate a somewhat nodular appearance which   cannot be evaluated on plain film radiographs, similar that seen on   prior radiographs, and best seen on CT chest 3/2/2024. Please refer to   this dictation for further formation and follow-up recommendations..   There is worsening pulmonary pacification within the right lung with   probable small bilateral pleural effusions. Cardiac silhouette is mildly   enlarged. Constellation findings are suggestive of worsening pulmonary   edema and/or multifocal pneumonia in the appropriate clinical context.   Correlation with patient history is recommended with follow-up chest CT   if clinically indicated. No pneumothorax is seen. There are median   sternotomy wires. A left PICC tip terminates over the expected location   of the superior vena cava.               This report was finalized on 4/11/2024 7:03 PM by Dr. Papo De La Torre M.D   on Workstation: [id number]              Assessment & Plan     Active Hospital Problems    Diagnosis     **Acute on chronic diastolic congestive heart failure     COPD (chronic obstructive pulmonary disease)     Septicemia due to enterococcus     Pneumonia     Iron deficiency anemia     Chronic hypoxemic respiratory failure     PAF (paroxysmal atrial fibrillation)        Assessment:  Melena  Anemia  Abnormal CT of the chest thickening of the distal esophagus noted  Personal history colon polyps  Acute on chronic respiratory failure  A-fib Eliquis on hold last dose 411    Plan:  Tentative EGD and colonoscopy when patient has improved from the  cardio/pulmonary standpoint  Continue twice daily dosing IV PPI therapy  Diet as tolerated  Monitor H&H and transfuse as needed per primary team    I discussed the patients findings and my recommendations with patient and family.    Flores Mays, APRN

## 2024-04-17 NOTE — THERAPY TREATMENT NOTE
Patient Name: Paz Browne  : 1955    MRN: [id number]                              Today's Date: 2024       Admit Date: 2024    Visit Dx:     ICD-10-CM ICD-9-CM   1. Sepsis, due to unspecified organism, unspecified whether acute organ dysfunction present  A41.9 038.9     995.91   2. Acute on chronic congestive heart failure, unspecified heart failure type  I50.9 428.0   3. Atrial fibrillation with RVR  I48.91 427.31   4. History of atrial fibrillation  Z86.79 V12.59   5. Chronic anticoagulation  Z79.01 V58.61   6. Hypoxia  R09.02 799.02   7. History of COPD  Z87.09 V12.69   8. History of endocarditis  Z86.79 V12.59     Patient Active Problem List   Diagnosis    PAF (paroxysmal atrial fibrillation)    Hypertension    Hyperlipidemia    Pain medication agreement    Hiatal hernia    Primary osteoarthritis involving multiple joints    Pulmonary hypertension    S/P TVR (tricuspid valve repair)    Pulmonary nodule    Restless leg syndrome    Chronic low back pain    Dysplastic polyp of colon    History of mitral valve replacement with tissue graft    Chronic hypoxemic respiratory failure    Anemia    Celiac artery stenosis    Intertrigo    Abnormal EKG    Muscle spasms of both lower extremities    Iron deficiency anemia    Adenomatous polyp of colon    Gastroesophageal reflux disease without esophagitis    Headache    History of endocarditis    Pneumonia of both lower lobes due to infectious organism    Peptic ulcer disease    Peripheral vascular disease    Hyperlipidemia    Hyperglycemia    COPD with acute exacerbation    Chronic diastolic CHF (congestive heart failure)    Chronic bilateral low back pain    COPD exacerbation    Leukocytosis    Elevated troponin    Pneumonia    Acute-on-chronic respiratory failure    Septicemia due to enterococcus    Acute on chronic diastolic congestive heart failure    Sepsis    COPD (chronic obstructive pulmonary disease)     Past Medical History:   Diagnosis Date     VAN (acute kidney injury)     Anemia     Asthma     Atrial flutter     cardioversion    Cataract     Celiac artery stenosis     Chronic respiratory failure with hypoxia     Colon polyp     COPD (chronic obstructive pulmonary disease)     GI bleed     Hiatal hernia     History of CHF (congestive heart failure)     due to MR    History of home oxygen therapy     3 lpm NC    History of mitral valve replacement with tissue graft     Hyperlipidemia     Hypertension     Infectious viral hepatitis     B    Intertrigo     Long term (current) use of anticoagulants     Mitral regurgitation     s/p tissue MVR    PAF (paroxysmal atrial fibrillation)     s/p MAZE    Pneumonia     Pulmonary hypertension     S/P TVR (tricuspid valve repair) 7/7/2016     Past Surgical History:   Procedure Laterality Date    CARDIAC CATHETERIZATION  09/01/2014    Right dominant systemt, normal coronary arteries.     CARDIAC CATHETERIZATION Left 6/10/2016    Procedure: Cardiac catheterization;  Surgeon: Sergei Hall MD;  Location: [facility] CATH INVASIVE LOCATION;  Service:     CARDIAC CATHETERIZATION N/A 6/10/2016    Procedure: Right Heart Cath;  Surgeon: Sergei Hall MD;  [facility];  Service:     CATARACT EXTRACTION      COLONOSCOPY      COLONOSCOPY N/A 8/4/2017    Procedure: COLONOSCOPY TO CECUM/TI WITH POLYPECTOMY ( COLD BX);  Surgeon: Cleveland Devine MD;  Location: [facility] ENDOSCOPY;  Service:     COLONOSCOPY N/A 8/10/2017    Procedure: COLONOSCOPY to cecum and TI with 2 clips placed at transverse;  Surgeon: Earnest PALOMO MD;  Location: [facility] ENDOSCOPY;  Service:     COLONOSCOPY N/A 12/22/2017    Procedure: COLONOSCOPY INTO CECUM WITH COLD POLYPECTOMIES;  Surgeon: Cleveland Devine MD;  Location: [facility] ENDOSCOPY;  Service:     COLONOSCOPY N/A 5/17/2021    Procedure: COLONOSCOPY to cecum;  Surgeon: Cleveland Devine MD;  Location: [facility] ENDOSCOPY;  Service: Gastroenterology;  Laterality: N/A;  Pre: Fe deficency anemia, h/x  of polyps  Post: fair prep, normal    ENDOSCOPY N/A 8/17/2017    Procedure: ESOPHAGOGASTRODUODENOSCOPY;  Surgeon: Porsha Ruby MD;  Location: [facility] ENDOSCOPY;  Service:     ENDOSCOPY N/A 12/22/2017    Procedure: ESOPHAGOGASTRODUODENOSCOPY WITH BIOPSIES;  Surgeon: Cleveland Devine MD;  Location: [facility] ENDOSCOPY;  Service:     ENDOSCOPY N/A 5/17/2021    Procedure: ESOPHAGOGASTRODUODENOSCOPY  with biopsies;  Surgeon: Cleveland Devine MD;  Location: [facility] ENDOSCOPY;  Service: Gastroenterology;  Laterality: N/A;  Pre: Fe deficency anemia, nausea, heme positive stool   Post: gastritis, sloughing of distal esophagus mucosa    GALLBLADDER SURGERY      HEMORRHOIDECTOMY      HYSTERECTOMY      KIDNEY SURGERY  04/22/2013    Stent placement    MAZE PROCEDURE      MITRAL VALVE REPLACEMENT      TONSILLECTOMY      TRICUSPID VALVE REPLACEMENT        General Information       Row Name 04/17/24 1149          Physical Therapy Time and Intention    Document Type therapy note (daily note)  -     Mode of Treatment individual therapy;physical therapy  -       Row Name 04/17/24 1149          General Information    Patient Profile Reviewed yes  -     Existing Precautions/Restrictions fall;oxygen therapy device and L/min  -       Row Name 04/17/24 1149          Cognition    Orientation Status (Cognition) oriented x 4  -       Row Name 04/17/24 1149          Safety Issues, Functional Mobility    Impairments Affecting Function (Mobility) endurance/activity tolerance;shortness of breath;strength  -               User Key  (r) = Recorded By, (t) = Taken By, (c) = Cosigned By      Initials Name Provider Type     Kaelyn Velazquez PT Physical Therapist                   Mobility       Row Name 04/17/24 1150          Bed Mobility    Bed Mobility supine-sit  -     Supine-Sit [place] (Bed Mobility) standby assist  -     Assistive Device (Bed Mobility) bed rails;head of bed elevated  -       Row Name 04/17/24 1154           Sit-Stand Transfer    Sit-Stand [place] (Transfers) standby assist  -SM       Row Name 04/17/24 1150          Gait/Stairs (Locomotion)    [place] Level (Gait) contact guard  -     Assistive Device (Gait) other (see comments)  A  -     Distance in Feet (Gait) 12  -SM     Deviations/Abnormal Patterns (Gait) festinating/shuffling;kassidy decreased;gait speed decreased  -     Bilateral Gait Deviations forward flexed posture  -     Comment, (Gait/Stairs) Slow shuffled steps around bed to the chair. No overt LOB noted. Patient on 3L O2 throughout ambulation. O2 sats dropped to 83% during ambulation. Several minutes required to return to >90%.  -               User Key  (r) = Recorded By, (t) = Taken By, (c) = Cosigned By      Initials Name Provider Type    Kaelyn Soriano PT Physical Therapist                   Obj/Interventions       Row Name 04/17/24 1151          Balance    Balance Assessment sitting static balance;sitting dynamic balance;sit to stand dynamic balance;standing static balance;standing dynamic balance  -     Static Sitting Balance independent  -     Dynamic Sitting Balance modified independence  -     Position, Sitting Balance sitting edge of bed  -     Sit to Stand Dynamic Balance contact guard  -SM     Static Standing Balance standby assist  -     Dynamic Standing Balance contact guard  -SM     Position/Device Used, Standing Balance supported;other (see comments)  HHA  -     Balance Interventions sitting;standing;sit to stand;supported;static;dynamic  -               User Key  (r) = Recorded By, (t) = Taken By, (c) = Cosigned By      Initials Name Provider Type    Kaelyn Soriano PT Physical Therapist                   Goals/Plan    No documentation.                  Clinical Impression       Row Name 04/17/24 1152          Pain    Pretreatment Pain Rating 0/10 - no pain  -SM     Posttreatment Pain Rating 0/10 - no pain  -SM       Row Name 04/17/24 1152           Plan of Care Review    Plan of Care Reviewed With patient;spouse  -SM     Outcome Evaluation Patient seen for PT session this AM. Patient agreeable to get OOB with some encouragement. Patient on 3L O2. Patient completed all bed mobility with SBA. Patient ambulated 12ft with HHA and CGA. Slow shuffled steps around bed to the chair. No overt LOB noted. Patient on 3L O2 throughout ambulation. O2 sats dropped to 83% during ambulation. Several minutes required to return to >90%. Patient reclined in chair at end of session. Encouraged patient to continue ambulating with nsg several times per day to increase activity endurance. Acute PT will continue to monitor.  -SM       Row Name 04/17/24 1152          Vital Signs    O2 Delivery Pre Treatment supplemental O2  3L  -SM     Intra SpO2 (%) 83  -SM     O2 Delivery Intra Treatment supplemental O2  3L  -SM     Post SpO2 (%) 90  -SM     O2 Delivery Post Treatment supplemental O2  3L  -SM     Pre Patient Position Supine  -SM     Intra Patient Position Standing  -SM     Post Patient Position Sitting  -SM       Row Name 04/17/24 1152          Positioning and Restraints    Pre-Treatment Position in bed  -SM     Post Treatment Position chair  -SM     In Chair notified nsg;reclined;call light within reach;encouraged to call for assist;exit alarm on  -SM               User Key  (r) = Recorded By, (t) = Taken By, (c) = Cosigned By      Initials Name Provider Type     Kaelyn Velazquez, PT Physical Therapist                   Outcome Measures       Row Name 04/17/24 1386          How much help from another person do you currently need...    Turning from your back to your side while in flat bed without using bedrails? 4  -SM     Moving from lying on back to sitting on the side of a flat bed without bedrails? 3  -SM     Moving to and from a bed to a chair (including a wheelchair)? 3  -SM     Standing up from a chair using your arms (e.g., wheelchair, bedside chair)? 3  -SM      Climbing 3-5 steps with a railing? 2  -     To walk in hospital room? 3  -     AM-PAC 6 Clicks Score (PT) 18  -     Highest Level of Mobility Goal 6 --> Walk 10 steps or more  -       Row Name 04/17/24 1154          Functional Assessment    Outcome Measure Options AM-PAC 6 Clicks Basic Mobility (PT)  -               User Key  (r) = Recorded By, (t) = Taken By, (c) = Cosigned By      Initials Name Provider Type     Kaelyn Velazquez PT Physical Therapist                                 Physical Therapy Education       Title: PT OT SLP Therapies (In Progress)       Topic: Physical Therapy (Done)       Point: Mobility training (Done)       Learning Progress Summary             Patient Acceptance, E, VU by  at 4/17/2024 1154    Acceptance, E, VU by  at 4/16/2024 0907    Eager, E,TB,D, VU,NR by MANAS at 4/15/2024 1251    Acceptance, E,D, VU,NR by MS at 4/14/2024 1349    Acceptance, E,D, NR by  at 4/12/2024 1421   Family Eager, E,TB,D, VU,NR by  at 4/15/2024 1251                         Point: Home exercise program (Done)       Learning Progress Summary             Patient Acceptance, E, VU by  at 4/16/2024 0907    Eager, E,TB,D, VU,NR by MANAS at 4/15/2024 1251    Acceptance, E,D, VU,NR by MS at 4/14/2024 1349   Family Eager, E,TB,D, VU,NR by  at 4/15/2024 1251                         Point: Body mechanics (Done)       Learning Progress Summary             Patient Acceptance, E, VU by  at 4/17/2024 1154    Acceptance, E, VU by  at 4/16/2024 0907    Eager, E,TB,D, VU,NR by MANAS at 4/15/2024 1251    Acceptance, E,D, VU,NR by MS at 4/14/2024 1349   Family Eager, E,TB,D, VU,NR by  at 4/15/2024 1251                         Point: Precautions (Done)       Learning Progress Summary             Patient Acceptance, E, VU by  at 4/17/2024 1154    Acceptance, E, VU by MABEL at 4/16/2024 0907    LU Richardson TB, D, SUDHAKAR,NR by MANAS at 4/15/2024 1251    Joel, EFFIE LEIGH VUNR by MS at 4/14/2024 1349   Family Debra  E,TB,D, VU,NR by MANAS at 4/15/2024 1251                                         User Key       Initials Effective Dates Name Provider Type Discipline    EE 06/16/21 -  Brianna Dejesus, PT Physical Therapist PT     03/07/18 -  Trish Newby PTA Physical Therapist Assistant PT    MS 06/16/21 -  Jose Luis Latif PT Physical Therapist PT     05/02/22 -  Kaelyn Velazquez PT Physical Therapist PT                  PT Recommendation and Plan     Plan of Care Reviewed With: patient, spouse  Outcome Evaluation: Patient seen for PT session this AM. Patient agreeable to get OOB with some encouragement. Patient on 3L O2. Patient completed all bed mobility with SBA. Patient ambulated 12ft with HHA and CGA. Slow shuffled steps around bed to the chair. No overt LOB noted. Patient on 3L O2 throughout ambulation. O2 sats dropped to 83% during ambulation. Several minutes required to return to >90%. Patient reclined in chair at end of session. Encouraged patient to continue ambulating with nsg several times per day to increase activity endurance. Acute PT will continue to monitor.     Time Calculation:         PT Charges       Row Name 04/17/24 1154             Time Calculation    Start Time 1002  -SM      Stop Time 1010  -SM      Time Calculation (min) 8 min  -SM      PT Received On 04/17/24  -      PT - Next Appointment 04/18/24  -         Time Calculation- PT    Total Timed Code Minutes- PT 8 minute(s)  -SM         Timed Charges    72955 - PT Therapeutic Activity Minutes 8  -SM         Total Minutes    Timed Charges Total Minutes 8  -SM       Total Minutes 8  -SM                User Key  (r) = Recorded By, (t) = Taken By, (c) = Cosigned By      Initials Name Provider Type     Kaelyn Velazquez, MILY Physical Therapist                  Therapy Charges for Today       Code Description Service Date Service Provider Modifiers Qty    [id number]  PT THERAPEUTIC ACT EA 15 MIN 4/16/2024 Kaelyn Velazquez PT GP 1     [id number]  PT THERAPEUTIC ACT EA 15 MIN 4/17/2024 Kaelyn Velazquez, PT GP 1            PT G-Codes  Outcome Measure Options: AM-PAC 6 Clicks Basic Mobility (PT)  AM-PAC 6 Clicks Score (PT): 18  AM-PAC 6 Clicks Score (OT): 16       Kaelyn Velazquez PT  4/17/2024

## 2024-04-17 NOTE — PROGRESS NOTES
LOS: 6 days     Chief Complaint:  concern for sepsis     Interval History: No fever.  WBC remains slightly elevated she received high-dose IV steroids.  She says she continues to breathe more comfortably today.  She is tolerating ampicillin and ceftriaxone without rash or diarrhea.    Meds:    Current Facility-Administered Medications:     acetaminophen (TYLENOL) tablet 650 mg, 650 mg, Oral, Q4H PRN, Susanne Bello MD, 650 mg at 04/17/24 0346    albuterol (PROVENTIL) nebulizer solution 0.083% 2.5 mg/3mL, 2.5 mg, Nebulization, Q6H PRN, Susanne Bello MD, 2.5 mg at 04/15/24 1922    ampicillin 2000 mg IVPB in 100 mL NS (MBP), 2 g, Intravenous, Q4H, Hanna Narvaez APRN, Last Rate: 200 mL/hr at 04/17/24 0834, 2 g at 04/17/24 0834    [Held by provider] apixaban (ELIQUIS) tablet 5 mg, 5 mg, Oral, Q12H, Susanne Bello MD, 5 mg at 04/11/24 2351    atorvastatin (LIPITOR) tablet 40 mg, 40 mg, Oral, Daily, Susanne Bello MD, 40 mg at 04/17/24 0834    sennosides-docusate (PERICOLACE) 8.6-50 MG per tablet 2 tablet, 2 tablet, Oral, BID PRN **AND** polyethylene glycol (MIRALAX) packet 17 g, 17 g, Oral, Daily PRN **AND** bisacodyl (DULCOLAX) EC tablet 5 mg, 5 mg, Oral, Daily PRN **AND** bisacodyl (DULCOLAX) suppository 10 mg, 10 mg, Rectal, Daily PRN, Susanne Bello MD    budesonide (PULMICORT) nebulizer solution 0.5 mg, 0.5 mg, Nebulization, BID - RT, Suasnne Bello MD, 0.5 mg at 04/17/24 0824    bumetanide (BUMEX) injection 2 mg, 2 mg, Intravenous, Q8H, Rodolfo Null MD, 2 mg at 04/17/24 0835    Calcium Replacement - Follow Nurse / BPA Driven Protocol, , Does not apply, PRN, Susanne Bello MD    cefTRIAXone (ROCEPHIN) 2,000 mg in sodium chloride 0.9 % 100 mL MBP, 2,000 mg, Intravenous, Q12H, Susanne Bello MD, Last Rate: 200 mL/hr at 04/17/24 0913, 2,000 mg at 04/17/24 0913    cetirizine (zyrTEC) tablet 10 mg, 10 mg, Oral, Daily, Susanne Bello MD,  10 mg at 04/17/24 0834    cyclobenzaprine (FLEXERIL) tablet 10 mg, 10 mg, Oral, Nightly, Susanne Bello MD, 10 mg at 04/16/24 2053    dilTIAZem (CARDIZEM) tablet 60 mg, 60 mg, Oral, Q6H, Jeremiah Saab MD, 60 mg at 04/17/24 0913    diphenhydrAMINE (BENADRYL) capsule 25 mg, 25 mg, Oral, Once PRN, Ronald Marie MD    HYDROcodone-acetaminophen (NORCO) 7.5-325 MG per tablet 1 tablet, 1 tablet, Oral, Q6H PRN, Trish Graham APRN, 1 tablet at 04/17/24 0546    ipratropium-albuterol (DUO-NEB) nebulizer solution 3 mL, 3 mL, Nebulization, Q4H PRN, Susanne Bello MD, 3 mL at 04/17/24 0821    Magnesium Standard Dose Replacement - Follow Nurse / BPA Driven Protocol, , Does not apply, PRN, Susanne Bello MD    melatonin tablet 3 mg, 3 mg, Oral, Nightly PRN, Susanne Bello MD, 3 mg at 04/16/24 2053    methylPREDNISolone sodium succinate (SOLU-Medrol) injection 40 mg, 40 mg, Intravenous, Q12H, Jose Luis Conteh MD, 40 mg at 04/17/24 0346    multivitamin with minerals 1 tablet, 1 tablet, Oral, Daily, Susanne Bello MD, 1 tablet at 04/17/24 0835    ondansetron ODT (ZOFRAN-ODT) disintegrating tablet 4 mg, 4 mg, Oral, Q6H PRN, 4 mg at 04/16/24 0920 **OR** ondansetron (ZOFRAN) injection 4 mg, 4 mg, Intravenous, Q6H PRN, Susanne Bello MD, 4 mg at 04/16/24 2053    pantoprazole (PROTONIX) injection 40 mg, 40 mg, Intravenous, Q12H, Jose Luis Conteh MD, 40 mg at 04/17/24 0835    Phosphorus Replacement - Follow Nurse / BPA Driven Protocol, , Does not apply, Eugenia SHARP Renate Andrea, MD    Potassium Replacement - Follow Nurse / BPA Driven Protocol, , Does not apply, Eugenia SHARP Renate Andrea, MD    roflumilast (DALIRESP) tablet 500 mcg, 500 mcg, Oral, Daily, Susanne Bello MD, 500 mcg at 04/17/24 0835    rOPINIRole (REQUIP) tablet 2 mg, 2 mg, Oral, Nightly, Susanne Bello MD, 2 mg at 04/16/24 2053    sertraline (ZOLOFT) tablet 100 mg, 100  mg, Oral, Daily, StingSusanne owen MD, 100 mg at 04/17/24 0834    sodium chloride 0.9 % flush 10 mL, 10 mL, Intravenous, Q12H, Jose Luis Conteh MD, 10 mL at 04/16/24 2054    sodium chloride 0.9 % flush 10 mL, 10 mL, Intravenous, Q12H, Jose Luis Conteh MD, 10 mL at 04/16/24 2054    sodium chloride 0.9 % flush 10 mL, 10 mL, Intravenous, PRN, Jose Luis Conteh MD    sodium chloride 0.9 % flush 20 mL, 20 mL, Intravenous, PRN, Jose Luis Conteh MD    sodium chloride 0.9 % infusion 40 mL, 40 mL, Intravenous, PRN, Jose Luis Conteh MD    sodium chloride nasal spray 2 spray, 2 spray, Each Nare, PRTANMAY, Jose Luis Conteh MD    spironolactone (ALDACTONE) tablet 25 mg, 25 mg, Oral, Daily, Jeremiah Saab MD, 25 mg at 04/17/24 0835        Vital Signs  Temp:  [97.7 °F (36.5 °C)-98.1 °F (36.7 °C)] 98.1 °F (36.7 °C)  Heart Rate:  [76-94] 88  Resp:  [18-20] 20  BP: (104-136)/(51-79) 136/57    Physical Exam:  General: Awake, alert, sitting up in bed, comfortable  Cardiovascular: NR  Respiratory: Fine bilateral lower lobe rales and scattered wheezing  GI: Soft, not tender  Vascular: LUE PICC w/o erythema    Results Review:    CBC, BMP, and blood cultures reviewed today  Lab Results   Component Value Date    WBC 13.43 (H) 04/17/2024    HGB 8.5 (L) 04/17/2024    HCT 28.1 (L) 04/17/2024    MCV 88.6 04/17/2024     04/17/2024     Lab Results   Component Value Date    GLUCOSE 137 (H) 04/17/2024    CALCIUM 8.9 04/17/2024     04/17/2024    K 3.7 04/17/2024    CO2 41.1 (H) 04/17/2024    CL 92 (L) 04/17/2024    BUN 18 04/17/2024    CREATININE 0.82 04/17/2024    EGFRRESULT 50.6 (L) 04/11/2023    EGFR 77.1 04/17/2024    BCR 22.0 04/17/2024    ANIONGAP 8.9 04/17/2024       Procalcitonin 0.06    Microbiology:  3/31 RPP: Negative  3/31 BCx: Enterococcus faecalis (not VRE) in 2/2 sets (susceptible to ampicillin)  4/1 MRSA nares PCR: Negative  4/2 blood cultures: Negative to  "date  4/11 RPP: Negative  4/11 BCx: Negative to date  4/12 respiratory culture: \"Rejected\"  4/13 respiratory culture: \"Rejected\"        Assessment & Plan   Shortness of breath due to pulmonary edema  Atrial fibrillation with rapid ventricular response  Enterococcus septicemia  Mitral valve replaced  History of tricuspid valve repair  COPD   Pulmonary hypertension    She remains afebrile with stable oxygen needs.  Her WBC is elevated likely due to recent high-dose intravenous steroids. Her blood cultures this admission   are negative to date.      I recommend that she continue her previous antibiotic plan of ampicillin 2 g IV every 4 hours and ceftriaxone 2 g IV every 12 hours for 6 weeks course with stop date 5/13/2024.  PICC line is already in.  She will need weekly CBC with differential and BMP faxed to me at [phone].    Please note, at discharge the ampicillin dosing will change to 12 g IV every 24 hours via continuous infusion.    Thank you for allowing me to be involved in the care of this patient. Infectious diseases will sign off at this time with antibiotics plan in place, but please call me at [phone] if any further ID questions or new ID concerns.    "

## 2024-04-17 NOTE — PLAN OF CARE
Goal Outcome Evaluation:   Pt resting on the bed, sr on monitor, 4L o2 nc, administered meds per mar, treated pain with narco x 2, tylenol x 1, zofran, melatonin this shift, vss, adequate urine output, purwick on, will continue to monitor.      Problem: Adult Inpatient Plan of Care  Goal: Absence of Hospital-Acquired Illness or Injury  Intervention: Identify and Manage Fall Risk  Recent Flowsheet Documentation  Taken 4/17/2024 0604 by Marisol Camarillo RN  Safety Promotion/Fall Prevention:   activity supervised   room organization consistent   safety round/check completed  Taken 4/17/2024 0404 by Marisol Camarillo RN  Safety Promotion/Fall Prevention:   activity supervised   room organization consistent   safety round/check completed  Taken 4/17/2024 0204 by Marisol Camarillo RN  Safety Promotion/Fall Prevention:   activity supervised   room organization consistent   safety round/check completed  Taken 4/17/2024 0005 by Marisol Camarillo RN  Safety Promotion/Fall Prevention:   activity supervised   room organization consistent   safety round/check completed  Taken 4/16/2024 2204 by Marisol Camarillo RN  Safety Promotion/Fall Prevention:   activity supervised   room organization consistent   safety round/check completed  Taken 4/16/2024 2040 by Marisol Camarillo RN  Safety Promotion/Fall Prevention:   activity supervised   room organization consistent   safety round/check completed  Intervention: Prevent Skin Injury  Recent Flowsheet Documentation  Taken 4/17/2024 0604 by Marisol Camarillo RN  Body Position: position changed independently  Taken 4/17/2024 0404 by Marisol Camarillo RN  Body Position: position changed independently  Taken 4/17/2024 0204 by Marisol Camarillo RN  Body Position: position changed independently  Taken 4/17/2024 0005 by Marisol Camarillo RN  Body Position: position changed independently  Skin Protection: adhesive use limited  Taken 4/16/2024 2204 by Marisol Camarillo RN  Body  Position: position changed independently  Taken 4/16/2024 2040 by Marisol Camarillo RN  Body Position: position changed independently  Skin Protection: adhesive use limited  Intervention: Prevent and Manage VTE (Venous Thromboembolism) Risk  Recent Flowsheet Documentation  Taken 4/17/2024 0005 by Marisol Camarillo RN  Activity Management: bedrest  Range of Motion: active ROM (range of motion) encouraged  Taken 4/16/2024 2040 by Marisol Camarillo RN  Activity Management: bedrest  VTE Prevention/Management: (Eliquis on hold) other (see comments)  Range of Motion: active ROM (range of motion) encouraged  Intervention: Prevent Infection  Recent Flowsheet Documentation  Taken 4/17/2024 0604 by Marisol Camarillo RN  Infection Prevention:   hand hygiene promoted   rest/sleep promoted  Taken 4/17/2024 0404 by Marisol Camarillo RN  Infection Prevention:   hand hygiene promoted   rest/sleep promoted  Taken 4/17/2024 0204 by Marisol Camarillo RN  Infection Prevention:   hand hygiene promoted   rest/sleep promoted  Taken 4/17/2024 0005 by Marisol Camarillo RN  Infection Prevention:   hand hygiene promoted   rest/sleep promoted  Taken 4/16/2024 2204 by Marisol Camarillo RN  Infection Prevention:   hand hygiene promoted   rest/sleep promoted  Taken 4/16/2024 2040 by Marisol Camarillo RN  Infection Prevention:   hand hygiene promoted   rest/sleep promoted  Goal: Optimal Comfort and Wellbeing  Intervention: Monitor Pain and Promote Comfort  Recent Flowsheet Documentation  Taken 4/16/2024 2040 by Marisol Camarillo RN  Pain Management Interventions:   see MAR   quiet environment facilitated  Intervention: Provide Person-Centered Care  Recent Flowsheet Documentation  Taken 4/17/2024 0005 by Marisol Camarillo RN  Trust Relationship/Rapport:   choices provided   care explained  Taken 4/16/2024 2040 by Marisol Camarillo RN  Trust Relationship/Rapport: care explained     Problem: Adjustment to Illness (Sepsis/Septic  Shock)  Goal: Optimal Coping  Intervention: Optimize Psychosocial Adjustment to Illness  Recent Flowsheet Documentation  Taken 4/17/2024 0005 by Marisol Camarillo RN  Supportive Measures: active listening utilized  Family/Support System Care: support provided  Taken 4/16/2024 2040 by Marisol Camarillo RN  Supportive Measures: active listening utilized  Family/Support System Care:   support provided   self-care encouraged     Problem: Infection Progression (Sepsis/Septic Shock)  Goal: Absence of Infection Signs and Symptoms  Intervention: Initiate Sepsis Management  Recent Flowsheet Documentation  Taken 4/17/2024 0604 by Marisol Camarillo RN  Infection Prevention:   hand hygiene promoted   rest/sleep promoted  Isolation Precautions: precautions maintained  Taken 4/17/2024 0404 by Marisol Camarillo RN  Infection Prevention:   hand hygiene promoted   rest/sleep promoted  Isolation Precautions: precautions maintained  Taken 4/17/2024 0204 by Marisol Camarillo RN  Infection Prevention:   hand hygiene promoted   rest/sleep promoted  Isolation Precautions: precautions maintained  Taken 4/17/2024 0005 by Marisol Camarillo RN  Infection Management: aseptic technique maintained  Infection Prevention:   hand hygiene promoted   rest/sleep promoted  Isolation Precautions: precautions maintained  Taken 4/16/2024 2204 by Marisol Camarillo RN  Infection Prevention:   hand hygiene promoted   rest/sleep promoted  Isolation Precautions: precautions maintained  Taken 4/16/2024 2040 by Marisol Camarillo RN  Infection Management: aseptic technique maintained  Infection Prevention:   hand hygiene promoted   rest/sleep promoted  Isolation Precautions: precautions maintained  Intervention: Promote Recovery  Recent Flowsheet Documentation  Taken 4/17/2024 0005 by Marisol Camarillo RN  Activity Management: bedrest  Airway/Ventilation Support: comfort measures provided  Sleep/Rest Enhancement: awakenings minimized  Taken 4/16/2024  2040 by Marisol Camarillo RN  Activity Management: bedrest  Airway/Ventilation Support: comfort measures provided  Sleep/Rest Enhancement: awakenings minimized  Intervention: Promote Stabilization  Recent Flowsheet Documentation  Taken 4/17/2024 0005 by Marisol Camarillo RN  Lung Protection Measures: fluid excess minimized  Taken 4/16/2024 2040 by Marisol Camarillo RN  Lung Protection Measures: fluid excess minimized     Problem: Heart Failure Comorbidity  Goal: Maintenance of Heart Failure Symptom Control  Intervention: Maintain Heart Failure-Management  Recent Flowsheet Documentation  Taken 4/17/2024 0604 by Marisol Camarillo RN  Medication Review/Management: medications reviewed  Taken 4/17/2024 0404 by Marisol Camarillo RN  Medication Review/Management: medications reviewed  Taken 4/17/2024 0204 by Marisol Camarillo RN  Medication Review/Management: medications reviewed  Taken 4/17/2024 0005 by Marisol Camarillo RN  Medication Review/Management: medications reviewed  Taken 4/16/2024 2204 by Marisol Camarillo RN  Medication Review/Management: medications reviewed  Taken 4/16/2024 2040 by Marisol Camarillo RN  Medication Review/Management: medications reviewed     Problem: Hypertension Comorbidity  Goal: Blood Pressure in Desired Range  Intervention: Maintain Blood Pressure Management  Recent Flowsheet Documentation  Taken 4/17/2024 0604 by Marisol Camarillo RN  Medication Review/Management: medications reviewed  Taken 4/17/2024 0404 by Marisol Camarillo RN  Medication Review/Management: medications reviewed  Taken 4/17/2024 0204 by Marisol Camarillo RN  Medication Review/Management: medications reviewed  Taken 4/17/2024 0005 by Marisol Camarillo RN  Syncope Management: position changed slowly  Medication Review/Management: medications reviewed  Taken 4/16/2024 2204 by Marisol Camarillo RN  Medication Review/Management: medications reviewed  Taken 4/16/2024 2040 by Marisol Camarillo RN  Syncope  Management: position changed slowly  Medication Review/Management: medications reviewed     Problem: Pain Chronic (Persistent) (Comorbidity Management)  Goal: Acceptable Pain Control and Functional Ability  Intervention: Manage Persistent Pain  Recent Flowsheet Documentation  Taken 4/17/2024 0604 by Marisol Camarillo RN  Medication Review/Management: medications reviewed  Taken 4/17/2024 0404 by Marisol Camarillo RN  Medication Review/Management: medications reviewed  Taken 4/17/2024 0204 by Marisol Camarillo RN  Medication Review/Management: medications reviewed  Taken 4/17/2024 0005 by Marisol Camarillo RN  Sleep/Rest Enhancement: awakenings minimized  Medication Review/Management: medications reviewed  Taken 4/16/2024 2204 by Marisol Camarillo RN  Medication Review/Management: medications reviewed  Taken 4/16/2024 2040 by Marisol Camarillo RN  Bowel Elimination Promotion: adequate fluid intake promoted  Sleep/Rest Enhancement: awakenings minimized  Medication Review/Management: medications reviewed  Intervention: Develop Pain Management Plan  Recent Flowsheet Documentation  Taken 4/16/2024 2040 by Marisol Camarillo RN  Pain Management Interventions:   see MAR   quiet environment facilitated  Intervention: Optimize Psychosocial Wellbeing  Recent Flowsheet Documentation  Taken 4/17/2024 0005 by Marisol Camarillo RN  Supportive Measures: active listening utilized  Diversional Activities: television  Family/Support System Care: support provided  Taken 4/16/2024 2040 by Marisol Camarillo RN  Supportive Measures: active listening utilized  Diversional Activities: television  Family/Support System Care:   support provided   self-care encouraged     Problem: Skin Injury Risk Increased  Goal: Skin Health and Integrity  Intervention: Promote and Optimize Oral Intake  Recent Flowsheet Documentation  Taken 4/17/2024 0005 by Marisol Camarillo RN  Oral Nutrition Promotion: rest periods promoted  Taken 4/16/2024 2040  by Marisol Camarillo RN  Oral Nutrition Promotion: rest periods promoted  Intervention: Optimize Skin Protection  Recent Flowsheet Documentation  Taken 4/17/2024 0604 by Marisol Camarillo RN  Head of Bed (HOB) Positioning: HOB elevated  Taken 4/17/2024 0404 by Marisol Camarillo RN  Head of Bed (HOB) Positioning: HOB elevated  Taken 4/17/2024 0204 by Marisol Camarillo RN  Head of Bed (HOB) Positioning: HOB elevated  Taken 4/17/2024 0005 by Marisol Camarillo RN  Pressure Reduction Techniques:   frequent weight shift encouraged   weight shift assistance provided  Head of Bed (HOB) Positioning: HOB elevated  Pressure Reduction Devices:   alternating pressure pump (ADD)   pressure-redistributing mattress utilized  Skin Protection: adhesive use limited  Taken 4/16/2024 2204 by Marisol Camarillo RN  Head of Bed (HOB) Positioning: HOB elevated  Taken 4/16/2024 2040 by Marisol Camarillo RN  Pressure Reduction Techniques:   frequent weight shift encouraged   weight shift assistance provided  Head of Bed (HOB) Positioning: HOB elevated  Pressure Reduction Devices:   alternating pressure pump (ADD)   pressure-redistributing mattress utilized  Skin Protection: adhesive use limited     Problem: Fall Injury Risk  Goal: Absence of Fall and Fall-Related Injury  Intervention: Identify and Manage Contributors  Recent Flowsheet Documentation  Taken 4/17/2024 0604 by Marisol Camarillo RN  Medication Review/Management: medications reviewed  Taken 4/17/2024 0404 by Marisol Camarillo RN  Medication Review/Management: medications reviewed  Taken 4/17/2024 0204 by Marisol Camarillo RN  Medication Review/Management: medications reviewed  Taken 4/17/2024 0005 by Marisol Camarillo RN  Medication Review/Management: medications reviewed  Taken 4/16/2024 2204 by Marisol Camarillo RN  Medication Review/Management: medications reviewed  Taken 4/16/2024 2040 by Marisol Camarillo RN  Medication Review/Management: medications  reviewed  Intervention: Promote Injury-Free Environment  Recent Flowsheet Documentation  Taken 4/17/2024 0604 by Marisol Camarillo RN  Safety Promotion/Fall Prevention:   activity supervised   room organization consistent   safety round/check completed  Taken 4/17/2024 0404 by Marisol Camarillo RN  Safety Promotion/Fall Prevention:   activity supervised   room organization consistent   safety round/check completed  Taken 4/17/2024 0204 by Marisol Camarillo RN  Safety Promotion/Fall Prevention:   activity supervised   room organization consistent   safety round/check completed  Taken 4/17/2024 0005 by Marisol Camarillo RN  Safety Promotion/Fall Prevention:   activity supervised   room organization consistent   safety round/check completed  Taken 4/16/2024 2204 by Marisol Camarillo RN  Safety Promotion/Fall Prevention:   activity supervised   room organization consistent   safety round/check completed  Taken 4/16/2024 2040 by Marisol Camarillo RN  Safety Promotion/Fall Prevention:   activity supervised   room organization consistent   safety round/check completed

## 2024-04-17 NOTE — PLAN OF CARE
Goal Outcome Evaluation:  Plan of Care Reviewed With: patient, spouse           Outcome Evaluation: Patient seen for PT session this AM. Patient agreeable to get OOB with some encouragement. Patient on 3L O2. Patient completed all bed mobility with SBA. Patient ambulated 12ft with HHA and CGA. Slow shuffled steps around bed to the chair. No overt LOB noted. Patient on 3L O2 throughout ambulation. O2 sats dropped to 83% during ambulation. Several minutes required to return to >90%. Patient reclined in chair at end of session. Encouraged patient to continue ambulating with nsg several times per day to increase activity endurance. Acute PT will continue to monitor.

## 2024-04-18 LAB
ANION GAP SERPL CALCULATED.3IONS-SCNC: 10.4 MMOL/L (ref 5–15)
BUN SERPL-MCNC: 19 MG/DL (ref 8–23)
BUN/CREAT SERPL: 20 (ref 7–25)
CALCIUM SPEC-SCNC: 9 MG/DL (ref 8.6–10.5)
CHLORIDE SERPL-SCNC: 97 MMOL/L (ref 98–107)
CO2 SERPL-SCNC: 37.6 MMOL/L (ref 22–29)
CREAT SERPL-MCNC: 0.95 MG/DL (ref 0.57–1)
DEPRECATED RDW RBC AUTO: 53.4 FL (ref 37–54)
EGFRCR SERPLBLD CKD-EPI 2021: 64.6 ML/MIN/1.73
ERYTHROCYTE [DISTWIDTH] IN BLOOD BY AUTOMATED COUNT: 16.6 % (ref 12.3–15.4)
GLUCOSE SERPL-MCNC: 114 MG/DL (ref 65–99)
HCT VFR BLD AUTO: 30.2 % (ref 34–46.6)
HGB BLD-MCNC: 9.3 G/DL (ref 12–15.9)
MAGNESIUM SERPL-MCNC: 2.4 MG/DL (ref 1.6–2.4)
MCH RBC QN AUTO: 27.4 PG (ref 26.6–33)
MCHC RBC AUTO-ENTMCNC: 30.8 G/DL (ref 31.5–35.7)
MCV RBC AUTO: 89.1 FL (ref 79–97)
PHOSPHATE SERPL-MCNC: 4.3 MG/DL (ref 2.5–4.5)
PLATELET # BLD AUTO: 254 10*3/MM3 (ref 140–450)
PMV BLD AUTO: 10.2 FL (ref 6–12)
POTASSIUM SERPL-SCNC: 3.2 MMOL/L (ref 3.5–5.2)
POTASSIUM SERPL-SCNC: 3.7 MMOL/L (ref 3.5–5.2)
RBC # BLD AUTO: 3.39 10*6/MM3 (ref 3.77–5.28)
SODIUM SERPL-SCNC: 145 MMOL/L (ref 136–145)
WBC NRBC COR # BLD AUTO: 14.98 10*3/MM3 (ref 3.4–10.8)

## 2024-04-18 PROCEDURE — 97535 SELF CARE MNGMENT TRAINING: CPT

## 2024-04-18 PROCEDURE — 25010000002 METHYLPREDNISOLONE PER 40 MG: Performed by: HOSPITALIST

## 2024-04-18 PROCEDURE — 97530 THERAPEUTIC ACTIVITIES: CPT

## 2024-04-18 PROCEDURE — 94799 UNLISTED PULMONARY SVC/PX: CPT

## 2024-04-18 PROCEDURE — 63710000001 ONDANSETRON ODT 4 MG TABLET DISPERSIBLE: Performed by: INTERNAL MEDICINE

## 2024-04-18 PROCEDURE — 25010000002 ONDANSETRON PER 1 MG: Performed by: INTERNAL MEDICINE

## 2024-04-18 PROCEDURE — 94664 DEMO&/EVAL PT USE INHALER: CPT

## 2024-04-18 PROCEDURE — 99232 SBSQ HOSP IP/OBS MODERATE 35: CPT | Performed by: INTERNAL MEDICINE

## 2024-04-18 PROCEDURE — 25010000002 BUMETANIDE PER 0.5 MG: Performed by: INTERNAL MEDICINE

## 2024-04-18 PROCEDURE — 99232 SBSQ HOSP IP/OBS MODERATE 35: CPT | Performed by: NURSE PRACTITIONER

## 2024-04-18 PROCEDURE — 94761 N-INVAS EAR/PLS OXIMETRY MLT: CPT

## 2024-04-18 PROCEDURE — 84132 ASSAY OF SERUM POTASSIUM: CPT | Performed by: STUDENT IN AN ORGANIZED HEALTH CARE EDUCATION/TRAINING PROGRAM

## 2024-04-18 PROCEDURE — 85027 COMPLETE CBC AUTOMATED: CPT | Performed by: STUDENT IN AN ORGANIZED HEALTH CARE EDUCATION/TRAINING PROGRAM

## 2024-04-18 PROCEDURE — 84100 ASSAY OF PHOSPHORUS: CPT | Performed by: STUDENT IN AN ORGANIZED HEALTH CARE EDUCATION/TRAINING PROGRAM

## 2024-04-18 PROCEDURE — 25010000002 AMPICILLIN PER 500 MG: Performed by: NURSE PRACTITIONER

## 2024-04-18 PROCEDURE — 80048 BASIC METABOLIC PNL TOTAL CA: CPT | Performed by: STUDENT IN AN ORGANIZED HEALTH CARE EDUCATION/TRAINING PROGRAM

## 2024-04-18 PROCEDURE — 25010000002 CEFTRIAXONE PER 250 MG: Performed by: INTERNAL MEDICINE

## 2024-04-18 PROCEDURE — 94760 N-INVAS EAR/PLS OXIMETRY 1: CPT

## 2024-04-18 PROCEDURE — 25010000002 ACETAZOLAMIDE PER 500 MG: Performed by: INTERNAL MEDICINE

## 2024-04-18 PROCEDURE — 83735 ASSAY OF MAGNESIUM: CPT | Performed by: STUDENT IN AN ORGANIZED HEALTH CARE EDUCATION/TRAINING PROGRAM

## 2024-04-18 RX ORDER — POTASSIUM CHLORIDE 750 MG/1
40 TABLET, FILM COATED, EXTENDED RELEASE ORAL EVERY 4 HOURS
Status: COMPLETED | OUTPATIENT
Start: 2024-04-18 | End: 2024-04-18

## 2024-04-18 RX ORDER — ACETAZOLAMIDE 500 MG/5ML
250 INJECTION, POWDER, LYOPHILIZED, FOR SOLUTION INTRAVENOUS EVERY 8 HOURS
Status: COMPLETED | OUTPATIENT
Start: 2024-04-18 | End: 2024-04-19

## 2024-04-18 RX ADMIN — AMPICILLIN SODIUM 2 G: 2 INJECTION, POWDER, FOR SOLUTION INTRAVENOUS at 01:07

## 2024-04-18 RX ADMIN — METHYLPREDNISOLONE SODIUM SUCCINATE 40 MG: 40 INJECTION, POWDER, FOR SOLUTION INTRAMUSCULAR; INTRAVENOUS at 16:30

## 2024-04-18 RX ADMIN — AMPICILLIN SODIUM 2 G: 2 INJECTION, POWDER, FOR SOLUTION INTRAVENOUS at 04:56

## 2024-04-18 RX ADMIN — Medication 10 ML: at 09:32

## 2024-04-18 RX ADMIN — AMPICILLIN SODIUM 2 G: 2 INJECTION, POWDER, FOR SOLUTION INTRAVENOUS at 16:30

## 2024-04-18 RX ADMIN — AMPICILLIN SODIUM 2 G: 2 INJECTION, POWDER, FOR SOLUTION INTRAVENOUS at 09:30

## 2024-04-18 RX ADMIN — ONDANSETRON 4 MG: 2 INJECTION INTRAMUSCULAR; INTRAVENOUS at 21:01

## 2024-04-18 RX ADMIN — DILTIAZEM HYDROCHLORIDE 60 MG: 60 TABLET, FILM COATED ORAL at 04:56

## 2024-04-18 RX ADMIN — CETIRIZINE HYDROCHLORIDE 10 MG: 10 TABLET ORAL at 09:30

## 2024-04-18 RX ADMIN — DILTIAZEM HYDROCHLORIDE 60 MG: 60 TABLET, FILM COATED ORAL at 21:59

## 2024-04-18 RX ADMIN — BUMETANIDE 2 MG: 0.25 INJECTION, SOLUTION INTRAMUSCULAR; INTRAVENOUS at 16:30

## 2024-04-18 RX ADMIN — Medication 10 ML: at 22:08

## 2024-04-18 RX ADMIN — DILTIAZEM HYDROCHLORIDE 60 MG: 60 TABLET, FILM COATED ORAL at 09:31

## 2024-04-18 RX ADMIN — AMPICILLIN SODIUM 2 G: 2 INJECTION, POWDER, FOR SOLUTION INTRAVENOUS at 13:14

## 2024-04-18 RX ADMIN — ACETAZOLAMIDE SODIUM 250 MG: 500 INJECTION, POWDER, LYOPHILIZED, FOR SOLUTION INTRAVENOUS at 02:19

## 2024-04-18 RX ADMIN — IPRATROPIUM BROMIDE AND ALBUTEROL SULFATE 3 ML: .5; 3 SOLUTION RESPIRATORY (INHALATION) at 19:39

## 2024-04-18 RX ADMIN — AMPICILLIN SODIUM 2 G: 2 INJECTION, POWDER, FOR SOLUTION INTRAVENOUS at 21:01

## 2024-04-18 RX ADMIN — BUDESONIDE 0.5 MG: 0.5 INHALANT RESPIRATORY (INHALATION) at 19:39

## 2024-04-18 RX ADMIN — ACETAZOLAMIDE SODIUM 250 MG: 500 INJECTION, POWDER, LYOPHILIZED, FOR SOLUTION INTRAVENOUS at 18:54

## 2024-04-18 RX ADMIN — DILTIAZEM HYDROCHLORIDE 60 MG: 60 TABLET, FILM COATED ORAL at 16:40

## 2024-04-18 RX ADMIN — SPIRONOLACTONE 25 MG: 25 TABLET, FILM COATED ORAL at 09:31

## 2024-04-18 RX ADMIN — CYCLOBENZAPRINE 10 MG: 10 TABLET, FILM COATED ORAL at 21:01

## 2024-04-18 RX ADMIN — NYSTATIN 500000 UNITS: 100000 SUSPENSION ORAL at 13:14

## 2024-04-18 RX ADMIN — POTASSIUM CHLORIDE 40 MEQ: 750 TABLET, EXTENDED RELEASE ORAL at 09:30

## 2024-04-18 RX ADMIN — BUMETANIDE 2 MG: 0.25 INJECTION, SOLUTION INTRAMUSCULAR; INTRAVENOUS at 01:06

## 2024-04-18 RX ADMIN — CEFTRIAXONE 2000 MG: 2 INJECTION, POWDER, FOR SOLUTION INTRAMUSCULAR; INTRAVENOUS at 10:44

## 2024-04-18 RX ADMIN — HYDROCODONE BITARTRATE AND ACETAMINOPHEN 1 TABLET: 7.5; 325 TABLET ORAL at 03:57

## 2024-04-18 RX ADMIN — BUMETANIDE 2 MG: 0.25 INJECTION, SOLUTION INTRAMUSCULAR; INTRAVENOUS at 09:31

## 2024-04-18 RX ADMIN — ALBUTEROL SULFATE 2.5 MG: 2.5 SOLUTION RESPIRATORY (INHALATION) at 07:15

## 2024-04-18 RX ADMIN — SERTRALINE 100 MG: 100 TABLET, FILM COATED ORAL at 09:31

## 2024-04-18 RX ADMIN — NYSTATIN 500000 UNITS: 100000 SUSPENSION ORAL at 09:31

## 2024-04-18 RX ADMIN — PANTOPRAZOLE SODIUM 40 MG: 40 INJECTION, POWDER, FOR SOLUTION INTRAVENOUS at 09:31

## 2024-04-18 RX ADMIN — POTASSIUM CHLORIDE 40 MEQ: 750 TABLET, EXTENDED RELEASE ORAL at 13:14

## 2024-04-18 RX ADMIN — HYDROCODONE BITARTRATE AND ACETAMINOPHEN 1 TABLET: 7.5; 325 TABLET ORAL at 13:14

## 2024-04-18 RX ADMIN — Medication 3 MG: at 22:03

## 2024-04-18 RX ADMIN — ROFLUMILAST 500 MCG: 500 TABLET ORAL at 09:30

## 2024-04-18 RX ADMIN — HYDROCODONE BITARTRATE AND ACETAMINOPHEN 1 TABLET: 7.5; 325 TABLET ORAL at 21:01

## 2024-04-18 RX ADMIN — CEFTRIAXONE 2000 MG: 2 INJECTION, POWDER, FOR SOLUTION INTRAMUSCULAR; INTRAVENOUS at 21:59

## 2024-04-18 RX ADMIN — Medication 1 TABLET: at 09:30

## 2024-04-18 RX ADMIN — PANTOPRAZOLE SODIUM 40 MG: 40 INJECTION, POWDER, FOR SOLUTION INTRAVENOUS at 21:01

## 2024-04-18 RX ADMIN — ALBUTEROL SULFATE 2.5 MG: 2.5 SOLUTION RESPIRATORY (INHALATION) at 13:08

## 2024-04-18 RX ADMIN — ONDANSETRON 4 MG: 4 TABLET, ORALLY DISINTEGRATING ORAL at 09:47

## 2024-04-18 RX ADMIN — ATORVASTATIN CALCIUM 40 MG: 20 TABLET, FILM COATED ORAL at 09:30

## 2024-04-18 RX ADMIN — METHYLPREDNISOLONE SODIUM SUCCINATE 40 MG: 40 INJECTION, POWDER, FOR SOLUTION INTRAMUSCULAR; INTRAVENOUS at 02:19

## 2024-04-18 RX ADMIN — BUDESONIDE 0.5 MG: 0.5 INHALANT RESPIRATORY (INHALATION) at 07:15

## 2024-04-18 RX ADMIN — ROPINIROLE 2 MG: 2 TABLET, FILM COATED ORAL at 21:59

## 2024-04-18 NOTE — CASE MANAGEMENT/SOCIAL WORK
Continued Stay Note  [facility]     Patient Name: Paz Browne  MRN: [id number]  Today's Date: 4/18/2024    Admit Date: 4/11/2024    Plan: Plan home with family.  CRUZ Ashley RN   Discharge Plan       Row Name 04/18/24 1039       Plan    Plan Plan home with family.  CRUZ Ashley RN    Plan Comments Spoke with pt and pt's spouse ( [place]) at bedside. Pt is on home O2 supplied by [employer].   Pt denies any discharge needs. Pt's spouse and brother in law ([place]) will assist pt at home if needed. Pt's spouse will transport pt home. Plan home with family. CRUZ Ashley RN                   Discharge Codes    No documentation.                 Expected Discharge Date and Time       Expected Discharge Date Expected Discharge Time    Apr 20, 2024               Amada Ashley RN

## 2024-04-18 NOTE — PLAN OF CARE
Goal Outcome Evaluation:  Plan of Care Reviewed With: patient, spouse        Progress: improving  Outcome Evaluation: Pt. agreeable to OT session, on 3L NC, pt. transferred to EOB SBA, donned socks s/u EOB and progressed into standing SBA. No AE utilizes this date, CGA for func mob in the room and transferred to the recliner. Pt. deferred need for toileting reporting completing prior to session. Pt. demo's slow and steady progress primarily limited by O2 SATS at this time. OT will continue to follow and progress as able.      Anticipated Discharge Disposition (OT): home with assist

## 2024-04-18 NOTE — PLAN OF CARE
Goal Outcome Evaluation:      Pain and nausea meds as needed, lai meals, vitals stable, abx given as ordered, NPO after midnight scheduled for EGD in AM, PICC line dressing CDI. Bm x1

## 2024-04-18 NOTE — PROGRESS NOTES
Patient's pulmonary status is as stable as it is going to be.  She is usually on 3 to 4 L of oxygen at baseline.  She has nonmodifiable factors and moderate risk, but not prohibitive for undergoing MAC sedation with upper and lower endoscopy evaluation by GI.  We will be available as needed

## 2024-04-18 NOTE — PROGRESS NOTES
"   LOS: 7 days   Patient Care Team:  Javan Martinez MD as PCP - General (Internal Medicine)  Ag Melo Jr., MD as Consulting Physician (Pulmonary Disease)  Cleveland Devine MD as Consulting Physician (Gastroenterology)  Earnest Gan MD as Consulting Physician (Cardiology)  Daniela Shin MD PhD as Consulting Physician (Hematology and Oncology)    Chief Complaint: SOA     Interval History: Continues to slowly improve.       Objective   Vital Signs  Temp:  [97.3 °F (36.3 °C)-98.6 °F (37 °C)] 97.3 °F (36.3 °C)  Heart Rate:  [] 102  Resp:  [16-20] 18  BP: (128-154)/(64-83) 150/83    Intake/Output Summary (Last 24 hours) at 4/18/2024 1719  Last data filed at 4/18/2024 0010  Gross per 24 hour   Intake 240 ml   Output 1350 ml   Net -1110 ml       Last Weight and Admission Weight        04/18/24  0409   Weight: 63.2 kg (139 lb 5.3 oz)     Flowsheet Rows      Flowsheet Row First Filed Value   Admission Height 170.2 cm (67\") Documented at 04/11/2024 1752   Admission Weight 59 kg (130 lb) Documented at 04/11/2024 1752            Physical Exam  HENT:      Head: Normocephalic.      Mouth/Throat:      Pharynx: Oropharynx is clear.   Eyes:      Conjunctiva/sclera: Conjunctivae normal.   Cardiovascular:      Rate and Rhythm: Normal rate. Rhythm irregular.   Pulmonary:      Comments: Poor air movement, very reduced at ases  Abdominal:      Palpations: Abdomen is soft.      Tenderness: There is no abdominal tenderness.   Musculoskeletal:         General: No swelling.   Neurological:      General: No focal deficit present.       Results Review:      Results from last 7 days   Lab Units 04/18/24  0737 04/17/24  0717 04/16/24  0648   SODIUM mmol/L 145 142 144   POTASSIUM mmol/L 3.2* 3.7 3.3*   CHLORIDE mmol/L 97* 92* 94*   CO2 mmol/L 37.6* 41.1* 40.7*   BUN mg/dL 19 18 12   CREATININE mg/dL 0.95 0.82 0.73   GLUCOSE mg/dL 114* 137* 122*   CALCIUM mg/dL 9.0 8.9 8.6         Results from last 7 days   Lab Units " 04/18/24  0737 04/17/24  0717 04/16/24  0648   WBC 10*3/mm3 14.98* 13.43* 12.74*   HEMOGLOBIN g/dL 9.3* 8.5* 8.5*   HEMATOCRIT % 30.2* 28.1* 27.3*   PLATELETS 10*3/mm3 254 234 223             Results from last 7 days   Lab Units 04/18/24  0737   MAGNESIUM mg/dL 2.4           I reviewed the patient's new clinical results.  I personally viewed and interpreted the patient's EKG/Telemetry data        Medication Review:   ampicillin, 2 g, Intravenous, Q4H  [Held by provider] apixaban, 5 mg, Oral, Q12H  atorvastatin, 40 mg, Oral, Daily  budesonide, 0.5 mg, Nebulization, BID - RT  bumetanide, 2 mg, Intravenous, Q8H  cefTRIAXone (ROCEPHIN) 2,000 mg in sodium chloride 0.9 % 100 mL MBP, 2,000 mg, Intravenous, Q12H  cetirizine, 10 mg, Oral, Daily  cyclobenzaprine, 10 mg, Oral, Nightly  dilTIAZem, 60 mg, Oral, Q6H  methylPREDNISolone sodium succinate, 40 mg, Intravenous, Q12H  multivitamin with minerals, 1 tablet, Oral, Daily  nystatin, 5 mL, Oral, 4x Daily  pantoprazole, 40 mg, Intravenous, Q12H  roflumilast, 500 mcg, Oral, Daily  rOPINIRole, 2 mg, Oral, Nightly  sertraline, 100 mg, Oral, Daily  sodium chloride, 10 mL, Intravenous, Q12H  sodium chloride, 10 mL, Intravenous, Q12H  spironolactone, 25 mg, Oral, Daily         Assessment & Plan     1. Acute on chronic HFpEF  2. PAF  3. Chronic hypoxemic respiratory failure/COPD  4. Enterococcus bacteremia  5. History of MV replacement  6. Anemia requiring transfusion    *O2 requirement is returning to baseline, she remains SOA but definitely feels better with diuresis, and clinically she is improving.     *Continue to encourage fluid restriction     *With diuresis, she's gotten bit more alkalotic than baseline (she compensates for chronic COPD), the acetazolamide given yesterday helped with that and likely helped augment diuresis a little, will repeat that again today. Continue spironolactone.     *HR is reasonably controlled on diltiazem, avoiding BB due to lung disease, apixaban  held due to GIB concerns.     *I also feel that her CV status is as acceptable as it will be for endoscopy --- will need to know timing. We would like to resume apixaban as soon as we can, but I would likely need to adjust diuretic therapy briefly while undergoing C-scope prep.     Rodolfo Null MD  04/18/24  17:19 EDT

## 2024-04-18 NOTE — PROGRESS NOTES
[facility]  Inpatient Progress Note    Reason for Follow Up: Melena    Subjective     Interval History:     No acute events overnight.  No abdominal pain, nausea or vomiting.  Passed brown stool this morning.  Pulmonary status slowly improving.    Hemoglobin 9.3 this morning.      Current Facility-Administered Medications:     acetaminophen (TYLENOL) tablet 650 mg, 650 mg, Oral, Q4H PRN, Susanne Bello MD, 650 mg at 04/17/24 0346    albuterol (PROVENTIL) nebulizer solution 0.083% 2.5 mg/3mL, 2.5 mg, Nebulization, Q6H PRN, Susanne Bello MD, 2.5 mg at 04/18/24 0715    ampicillin 2000 mg IVPB in 100 mL NS (MBP), 2 g, Intravenous, Q4H, Hanna Narvaez APRN, Last Rate: 200 mL/hr at 04/18/24 0930, 2 g at 04/18/24 0930    [Held by provider] apixaban (ELIQUIS) tablet 5 mg, 5 mg, Oral, Q12H, Susanne Bello MD, 5 mg at 04/11/24 2351    atorvastatin (LIPITOR) tablet 40 mg, 40 mg, Oral, Daily, Susanne Bello MD, 40 mg at 04/18/24 0930    sennosides-docusate (PERICOLACE) 8.6-50 MG per tablet 2 tablet, 2 tablet, Oral, BID PRN **AND** polyethylene glycol (MIRALAX) packet 17 g, 17 g, Oral, Daily PRN **AND** bisacodyl (DULCOLAX) EC tablet 5 mg, 5 mg, Oral, Daily PRN **AND** bisacodyl (DULCOLAX) suppository 10 mg, 10 mg, Rectal, Daily PRN, Susanne Bello MD    budesonide (PULMICORT) nebulizer solution 0.5 mg, 0.5 mg, Nebulization, BID - RT, Susanne Bello MD, 0.5 mg at 04/18/24 0715    bumetanide (BUMEX) injection 2 mg, 2 mg, Intravenous, Q8H, Rodolfo Null MD, 2 mg at 04/18/24 0931    Calcium Replacement - Follow Nurse / BPA Driven Protocol, , Does not apply, PRN, Susanne Bello MD    cefTRIAXone (ROCEPHIN) 2,000 mg in sodium chloride 0.9 % 100 mL MBP, 2,000 mg, Intravenous, Q12H, Susanne Bello MD, Last Rate: 200 mL/hr at 04/17/24 2116, 2,000 mg at 04/17/24 2116    cetirizine (zyrTEC) tablet 10 mg, 10 mg, Oral, Daily, Susanne Bello  MD Sameer, 10 mg at 04/18/24 0930    cyclobenzaprine (FLEXERIL) tablet 10 mg, 10 mg, Oral, Nightly, Susanne Bello MD, 10 mg at 04/17/24 2115    dilTIAZem (CARDIZEM) tablet 60 mg, 60 mg, Oral, Q6H, Jeremiah Saab MD, 60 mg at 04/18/24 0931    diphenhydrAMINE (BENADRYL) capsule 25 mg, 25 mg, Oral, Once PRN, Ronald Marie MD    HYDROcodone-acetaminophen (NORCO) 7.5-325 MG per tablet 1 tablet, 1 tablet, Oral, Q6H PRN, Trish Graham APRN, 1 tablet at 04/18/24 0357    ipratropium-albuterol (DUO-NEB) nebulizer solution 3 mL, 3 mL, Nebulization, Q4H PRN, Susanne Bello MD, 3 mL at 04/17/24 0821    Magnesium Standard Dose Replacement - Follow Nurse / BPA Driven Protocol, , Does not apply, PRN, Susanne Bello MD    melatonin tablet 3 mg, 3 mg, Oral, Nightly PRN, Susanne Bello MD, 3 mg at 04/17/24 2134    methylPREDNISolone sodium succinate (SOLU-Medrol) injection 40 mg, 40 mg, Intravenous, Q12H, Jose Luis Conteh MD, 40 mg at 04/18/24 0219    multivitamin with minerals 1 tablet, 1 tablet, Oral, Daily, Susanne Bello MD, 1 tablet at 04/18/24 0930    nystatin (MYCOSTATIN) 100,000 unit/mL suspension 500,000 Units, 5 mL, Oral, 4x Daily, Arina Castaneda MD, 500,000 Units at 04/18/24 0931    ondansetron ODT (ZOFRAN-ODT) disintegrating tablet 4 mg, 4 mg, Oral, Q6H PRN, 4 mg at 04/18/24 0947 **OR** ondansetron (ZOFRAN) injection 4 mg, 4 mg, Intravenous, Q6H PRN, Susanne Bello MD, 4 mg at 04/17/24 2134    pantoprazole (PROTONIX) injection 40 mg, 40 mg, Intravenous, Q12H, Jose Luis Conteh MD, 40 mg at 04/18/24 0931    Phosphorus Replacement - Follow Nurse / BPA Driven Protocol, , Does not apply, Eugenia SHARP Renate Andrea, MD    potassium chloride (K-DUR,KLOR-CON) ER tablet 40 mEq, 40 mEq, Oral, Q4H, Arina Castaneda MD, 40 mEq at 04/18/24 0930    Potassium Replacement - Follow Nurse / BPA Driven Protocol, , Does not apply, PRN, Stingl,  Susanne Estrella MD    roflumilast (DALIRESP) tablet 500 mcg, 500 mcg, Oral, Daily, Susanne Bello MD, 500 mcg at 04/18/24 0930    rOPINIRole (REQUIP) tablet 2 mg, 2 mg, Oral, Nightly, Susanne Bello MD, 2 mg at 04/17/24 2115    sertraline (ZOLOFT) tablet 100 mg, 100 mg, Oral, Daily, Susanne Bello MD, 100 mg at 04/18/24 0931    sodium chloride 0.9 % flush 10 mL, 10 mL, Intravenous, Q12H, Jose Luis Conteh MD, 10 mL at 04/18/24 0932    sodium chloride 0.9 % flush 10 mL, 10 mL, Intravenous, Q12H, Jose Luis Conteh MD, 10 mL at 04/18/24 0932    sodium chloride 0.9 % flush 10 mL, 10 mL, Intravenous, PRN, Jose Luis Conteh MD    sodium chloride 0.9 % flush 20 mL, 20 mL, Intravenous, PRN, Jose Luis Conteh MD    sodium chloride 0.9 % infusion 40 mL, 40 mL, Intravenous, PRN, Jose Luis Conteh MD    sodium chloride nasal spray 2 spray, 2 spray, Each Nare, LILA, Jose Luis Conteh MD    spironolactone (ALDACTONE) tablet 25 mg, 25 mg, Oral, Daily, Jeremiah Saab MD, 25 mg at 04/18/24 0931  Review of Systems:    The following systems were reviewed and negative;  gastrointestinal    Objective     Vital Signs  Temp:  [97.7 °F (36.5 °C)-98.6 °F (37 °C)] 97.7 °F (36.5 °C)  Heart Rate:  [] 88  Resp:  [16-18] 16  BP: (128-154)/(64-82) 137/76  Body mass index is 21.82 kg/m².    Intake/Output Summary (Last 24 hours) at 4/18/2024 1042  Last data filed at 4/18/2024 0010  Gross per 24 hour   Intake 840 ml   Output 1350 ml   Net -510 ml     No intake/output data recorded.     Physical Exam:   General: patient awake, alert and cooperative   Abdomen: soft, nontender, nondistended; normal bowel sounds      Results Review:     I reviewed the patient's new clinical results.    Results from last 7 days   Lab Units 04/18/24  0737 04/17/24  0717 04/16/24  0648   WBC 10*3/mm3 14.98* 13.43* 12.74*   HEMOGLOBIN g/dL 9.3* 8.5* 8.5*   HEMATOCRIT % 30.2* 28.1* 27.3*    PLATELETS 10*3/mm3 254 234 223     Results from last 7 days   Lab Units 04/18/24  0737 04/17/24  0717 04/16/24  0648 04/13/24  2047 04/13/24  0550 04/12/24  1049 04/12/24  0533   SODIUM mmol/L 145 142 144   < > 146*  --  144   POTASSIUM mmol/L 3.2* 3.7 3.3*   < > 3.6   < > 3.6   CHLORIDE mmol/L 97* 92* 94*   < > 99  --  102   CO2 mmol/L 37.6* 41.1* 40.7*   < > 36.2*  --  33.4*   BUN mg/dL 19 18 12   < > 9  --  8   CREATININE mg/dL 0.95 0.82 0.73   < > 0.78  --  0.82   CALCIUM mg/dL 9.0 8.9 8.6   < > 8.6  --  7.9*   BILIRUBIN mg/dL  --   --  0.4  --  0.5  --  0.5   ALK PHOS U/L  --   --  55  --  66  --  69   ALT (SGPT) U/L  --   --  11  --  15  --  17   AST (SGOT) U/L  --   --  16  --  23  --  24   GLUCOSE mg/dL 114* 137* 122*   < > 102*  --  126*    < > = values in this interval not displayed.         Lab Results   Lab Value Date/Time    LIPASE 30 06/09/2023 0445    LIPASE 46 08/13/2017 0449       Radiology:  XR Chest 1 View   Final Result   1. FINDINGS consistent with bilateral pulmonary edema and pleural   effusions. These findings may have progressed slightly since the   4/11/2024 study.           This report was finalized on 4/14/2024 2:22 PM by Dr. Donis Guzmán M.D on Workstation: [id number]          CT Chest Without Contrast Diagnostic   Final Result   1.  Small bilateral pleural effusions with bibasilar pulmonary   pacification, right greater than left. Some areas have a somewhat   nodular appearance. Findings are most suggestive of multifocal   pneumonia, possibly aspiration, and correlation with patient history is   recommended to determine most appropriate etiology. Given the nodular   appearance, follow-up with chest CT in 6 to 8 weeks is recommended to   ensure resolution and exclude the possibility of malignancy. New   mediastinal adenopathy enlarged since three 224 can be followed at this   time as well.   2.  Thickening of distal esophagus suggestive of esophagitis in   appropriate context.  Correlation with patient history is recommended   with follow-up endoscopy if clinically indicated.   3.  Severe emphysema.   4.  Other findings as above.               This report was finalized on 4/12/2024 5:56 PM by Dr. Papo De La Torre M.D   on Workstation: [id number]          XR Chest 1 View   Final Result   Findings impression:   Background interstitial thickening is present throughout the bilateral   lungs with many areas demonstrate a somewhat nodular appearance which   cannot be evaluated on plain film radiographs, similar that seen on   prior radiographs, and best seen on CT chest 3/2/2024. Please refer to   this dictation for further formation and follow-up recommendations..   There is worsening pulmonary pacification within the right lung with   probable small bilateral pleural effusions. Cardiac silhouette is mildly   enlarged. Constellation findings are suggestive of worsening pulmonary   edema and/or multifocal pneumonia in the appropriate clinical context.   Correlation with patient history is recommended with follow-up chest CT   if clinically indicated. No pneumothorax is seen. There are median   sternotomy wires. A left PICC tip terminates over the expected location   of the superior vena cava.               This report was finalized on 4/11/2024 7:03 PM by Dr. Papo De La Torre M.D   on Workstation: [id number]              Assessment & Plan     Active Hospital Problems    Diagnosis     **Acute on chronic diastolic congestive heart failure     COPD (chronic obstructive pulmonary disease)     Septicemia due to enterococcus     Pneumonia     Iron deficiency anemia     Chronic hypoxemic respiratory failure     PAF (paroxysmal atrial fibrillation)        Assessment:  Melena  Anemia  Abnormal CT of the chest thickening of the distal esophagus noted  Personal history colon polyps  Acute on chronic respiratory failure  A-fib Eliquis on hold last dose 411    Plan:  Tentative EGD and colonoscopy when patient  has improved from the cardio/pulmonary standpoint  Continue twice daily dosing IV PPI therapy  Diet as tolerated  Monitor H&H and transfuse as needed per primary team    I discussed the patients findings and my recommendations with patient and family.    Flores Mays, APRN

## 2024-04-18 NOTE — THERAPY TREATMENT NOTE
Patient Name: Paz Browne  : 1955    MRN: [id number]                              Today's Date: 2024       Admit Date: 2024    Visit Dx:     ICD-10-CM ICD-9-CM   1. Sepsis, due to unspecified organism, unspecified whether acute organ dysfunction present  A41.9 038.9     995.91   2. Acute on chronic congestive heart failure, unspecified heart failure type  I50.9 428.0   3. Atrial fibrillation with RVR  I48.91 427.31   4. History of atrial fibrillation  Z86.79 V12.59   5. Chronic anticoagulation  Z79.01 V58.61   6. Hypoxia  R09.02 799.02   7. History of COPD  Z87.09 V12.69   8. History of endocarditis  Z86.79 V12.59     Patient Active Problem List   Diagnosis    PAF (paroxysmal atrial fibrillation)    Hypertension    Hyperlipidemia    Pain medication agreement    Hiatal hernia    Primary osteoarthritis involving multiple joints    Pulmonary hypertension    S/P TVR (tricuspid valve repair)    Pulmonary nodule    Restless leg syndrome    Chronic low back pain    Dysplastic polyp of colon    History of mitral valve replacement with tissue graft    Chronic hypoxemic respiratory failure    Anemia    Celiac artery stenosis    Intertrigo    Abnormal EKG    Muscle spasms of both lower extremities    Iron deficiency anemia    Adenomatous polyp of colon    Gastroesophageal reflux disease without esophagitis    Headache    History of endocarditis    Pneumonia of both lower lobes due to infectious organism    Peptic ulcer disease    Peripheral vascular disease    Hyperlipidemia    Hyperglycemia    COPD with acute exacerbation    Chronic diastolic CHF (congestive heart failure)    Chronic bilateral low back pain    COPD exacerbation    Leukocytosis    Elevated troponin    Pneumonia    Acute-on-chronic respiratory failure    Septicemia due to enterococcus    Acute on chronic diastolic congestive heart failure    Sepsis    COPD (chronic obstructive pulmonary disease)     Past Medical History:   Diagnosis Date     VAN (acute kidney injury)     Anemia     Asthma     Atrial flutter     cardioversion    Cataract     Celiac artery stenosis     Chronic respiratory failure with hypoxia     Colon polyp     COPD (chronic obstructive pulmonary disease)     GI bleed     Hiatal hernia     History of CHF (congestive heart failure)     due to MR    History of home oxygen therapy     3 lpm NC    History of mitral valve replacement with tissue graft     Hyperlipidemia     Hypertension     Infectious viral hepatitis     B    Intertrigo     Long term (current) use of anticoagulants     Mitral regurgitation     s/p tissue MVR    PAF (paroxysmal atrial fibrillation)     s/p MAZE    Pneumonia     Pulmonary hypertension     S/P TVR (tricuspid valve repair) 7/7/2016     Past Surgical History:   Procedure Laterality Date    CARDIAC CATHETERIZATION  09/01/2014    Right dominant systemt, normal coronary arteries.     CARDIAC CATHETERIZATION Left 6/10/2016    Procedure: Cardiac catheterization;  Surgeon: Sergei Hall MD;  Location: [facility] CATH INVASIVE LOCATION;  Service:     CARDIAC CATHETERIZATION N/A 6/10/2016    Procedure: Right Heart Cath;  Surgeon: Sergei Hall MD;  [facility];  Service:     CATARACT EXTRACTION      COLONOSCOPY      COLONOSCOPY N/A 8/4/2017    Procedure: COLONOSCOPY TO CECUM/TI WITH POLYPECTOMY ( COLD BX);  Surgeon: Cleveland Devine MD;  Location: [facility] ENDOSCOPY;  Service:     COLONOSCOPY N/A 8/10/2017    Procedure: COLONOSCOPY to cecum and TI with 2 clips placed at transverse;  Surgeon: Earnest PALOMO MD;  Location: [facility] ENDOSCOPY;  Service:     COLONOSCOPY N/A 12/22/2017    Procedure: COLONOSCOPY INTO CECUM WITH COLD POLYPECTOMIES;  Surgeon: Cleveland Devine MD;  Location: [facility] ENDOSCOPY;  Service:     COLONOSCOPY N/A 5/17/2021    Procedure: COLONOSCOPY to cecum;  Surgeon: Cleveland Devine MD;  Location: [facility] ENDOSCOPY;  Service: Gastroenterology;  Laterality: N/A;  Pre: Fe deficency anemia, h/x  of polyps  Post: fair prep, normal    ENDOSCOPY N/A 8/17/2017    Procedure: ESOPHAGOGASTRODUODENOSCOPY;  Surgeon: Porsha Ruby MD;  Location: [facility] ENDOSCOPY;  Service:     ENDOSCOPY N/A 12/22/2017    Procedure: ESOPHAGOGASTRODUODENOSCOPY WITH BIOPSIES;  Surgeon: Cleveland Devine MD;  Location: [facility] ENDOSCOPY;  Service:     ENDOSCOPY N/A 5/17/2021    Procedure: ESOPHAGOGASTRODUODENOSCOPY  with biopsies;  Surgeon: Cleveland Devine MD;  Location: [facility] ENDOSCOPY;  Service: Gastroenterology;  Laterality: N/A;  Pre: Fe deficency anemia, nausea, heme positive stool   Post: gastritis, sloughing of distal esophagus mucosa    GALLBLADDER SURGERY      HEMORRHOIDECTOMY      HYSTERECTOMY      KIDNEY SURGERY  04/22/2013    Stent placement    MAZE PROCEDURE      MITRAL VALVE REPLACEMENT      TONSILLECTOMY      TRICUSPID VALVE REPLACEMENT        General Information       Row Name 04/18/24 1141          Physical Therapy Time and Intention    Document Type therapy note (daily note)  -     Mode of Treatment physical therapy  -       Row Name 04/18/24 1141          General Information    Patient Profile Reviewed yes  -SM     Existing Precautions/Restrictions fall;oxygen therapy device and L/min  3L O2  -       Row Name 04/18/24 1141          Cognition    Orientation Status (Cognition) oriented x 4  -       Row Name 04/18/24 1141          Safety Issues, Functional Mobility    Impairments Affecting Function (Mobility) endurance/activity tolerance;shortness of breath;strength  -               User Key  (r) = Recorded By, (t) = Taken By, (c) = Cosigned By      Initials Name Provider Type     Kaelyn Velazquez PT Physical Therapist                   Mobility       Row Name 04/18/24 1142          Bed Mobility    Bed Mobility supine-sit  -     Supine-Sit [place] (Bed Mobility) supervision  -     Assistive Device (Bed Mobility) bed rails;head of bed elevated  -     Comment, (Bed Mobility) Patient UIC at end of  session  -[facility] Name 04/18/24 1142          Sit-Stand Transfer    Sit-Stand [place] (Transfers) standby assist  -SM       Row Name 04/18/24 1142          Gait/Stairs (Locomotion)    [place] Level (Gait) contact guard  -     Assistive Device (Gait) --  no AD  -     Distance in Feet (Gait) 25  -     Deviations/Abnormal Patterns (Gait) festinating/shuffling;kassidy decreased;gait speed decreased  -     Bilateral Gait Deviations forward flexed posture  -     Comment, (Gait/Stairs) Gait slow but mostly steady with no overt LOB noted. Patient on 3L O2 throughout session. O2 sats dropped to 82% following ambulation but recovered >90% with a seated rest break.  -               User Key  (r) = Recorded By, (t) = Taken By, (c) = Cosigned By      Initials Name Provider Type    Kaelyn Soriano PT Physical Therapist                   Obj/Interventions       Row Name 04/18/24 1143          Balance    Balance Assessment sitting static balance;sitting dynamic balance;standing static balance;sit to stand dynamic balance;standing dynamic balance  -     Static Sitting Balance independent  -     Dynamic Sitting Balance modified independence  -     Position, Sitting Balance sitting edge of bed  -     Sit to Stand Dynamic Balance standby assist  -     Static Standing Balance standby assist  -     Dynamic Standing Balance contact guard  -     Position/Device Used, Standing Balance unsupported  -     Balance Interventions sitting;standing;sit to stand;static;dynamic  -               User Key  (r) = Recorded By, (t) = Taken By, (c) = Cosigned By      Initials Name Provider Type     Kaelyn Velazquez PT Physical Therapist                   Goals/Plan    No documentation.                  Clinical Impression       Row Name 04/18/24 1144          Pain    Pretreatment Pain Rating 0/10 - no pain  -     Posttreatment Pain Rating 0/10 - no pain  -SM       Row Name 04/18/24 1144           Plan of Care Review    Plan of Care Reviewed With patient;spouse  -     Outcome Evaluation Patient seen for PT session this AM. Patient reports she is feeling much better and starting to have more strength and energy for activity. Patient completed all bed mobility with SV this date. Patient performed STS from EOB with SBA. Patient was able to increase ambulation distance within room this date. Patient ambulated with CGA and no AD. Gait slow but mostly steady with no overt LOB noted. Patient on 3L O2 throughout session. O2 sats dropped to 82% following ambulation but recovered >90% with a seated rest break. Patient reclined in chair at end of session. Encouraged patient to continue performed B LE exercises throughout the day independently. Patient agreeable. Acute PT will continue to monitor.  -       Row Name 04/18/24 1144          Vital Signs    O2 Delivery Pre Treatment supplemental O2  -SM     O2 Delivery Intra Treatment supplemental O2  -SM     O2 Delivery Post Treatment supplemental O2  -SM     Pre Patient Position Supine  -SM     Intra Patient Position Standing  -SM     Post Patient Position Sitting  -       Row Name 04/18/24 1144          Positioning and Restraints    Pre-Treatment Position in bed  -SM     Post Treatment Position chair  -SM     In Chair notified nsg;call light within reach;encouraged to call for assist;exit alarm on;sitting;with family/caregiver  -               User Key  (r) = Recorded By, (t) = Taken By, (c) = Cosigned By      Initials Name Provider Type     Kaelyn Velazquez, PT Physical Therapist                   Outcome Measures       Row Name 04/18/24 1146          How much help from another person do you currently need...    Turning from your back to your side while in flat bed without using bedrails? 4  -SM     Moving from lying on back to sitting on the side of a flat bed without bedrails? 4  -SM     Moving to and from a bed to a chair (including a wheelchair)? 3  -SM      Standing up from a chair using your arms (e.g., wheelchair, bedside chair)? 3  -SM     Climbing 3-5 steps with a railing? 2  -SM     To walk in hospital room? 3  -SM     AM-PAC 6 Clicks Score (PT) 19  -SM     Highest Level of Mobility Goal 6 --> Walk 10 steps or more  -       Row Name 04/18/24 1128          Functional Assessment    Outcome Measure Options AM-PAC 6 Clicks Daily Activity (OT)  -AG               User Key  (r) = Recorded By, (t) = Taken By, (c) = Cosigned By      Initials Name Provider Type    Kaelyn Soriano, PT Physical Therapist    AG Franc Mcdermott, OT Occupational Therapist                                 Physical Therapy Education       Title: PT OT SLP Therapies (In Progress)       Topic: Physical Therapy (Done)       Point: Mobility training (Done)       Learning Progress Summary             Patient Acceptance, E, VU by  at 4/18/2024 1146    Acceptance, E, VU by  at 4/17/2024 1154    Acceptance, E, VU by  at 4/16/2024 0907    Eager, E,TB,D, VU,NR by  at 4/15/2024 1251    Acceptance, E,D, VU,NR by MS at 4/14/2024 1349    Acceptance, E,D, NR by  at 4/12/2024 1421   Family Eager, E,TB,D, VU,NR by  at 4/15/2024 1251                         Point: Home exercise program (Done)       Learning Progress Summary             Patient Acceptance, E, VU by  at 4/18/2024 1146    Acceptance, E, VU by  at 4/16/2024 0907    Eager, E,TB,D, VU,NR by  at 4/15/2024 1251    Acceptance, E,D, VU,NR by MS at 4/14/2024 1349   Family Eager, E,TB,D, VU,NR by  at 4/15/2024 1251                         Point: Body mechanics (Done)       Learning Progress Summary             Patient Acceptance, E, VU by  at 4/18/2024 1146    Acceptance, E, VU by  at 4/17/2024 1154    Acceptance, E, VU by  at 4/16/2024 0907    Eager, E,TB,D, VU,NR by  at 4/15/2024 1251    Acceptance, EFFIE LEIGH VU,NR by MS at 4/14/2024 1349   Family LU Richardson TB, D, SUDHAKAR,NR by JM at 4/15/2024 1251                         Point:  Precautions (Done)       Learning Progress Summary             Patient Acceptance, E, VU by  at 4/18/2024 1146    Acceptance, E, VU by  at 4/17/2024 1154    Acceptance, E, VU by  at 4/16/2024 0907    Eager, E,TB,D, VU,NR by  at 4/15/2024 1251    Acceptance, E,D, VU,NR by MS at 4/14/2024 1349   Family Eager, E,TB,D, VU,NR by  at 4/15/2024 1251                                         User Key       Initials Effective Dates Name Provider Type Discipline     06/16/21 -  Brianna Dejesus, PT Physical Therapist PT     03/07/18 -  Trihs Newby PTA Physical Therapist Assistant PT    MS 06/16/21 -  Jose Luis Latif, PT Physical Therapist PT     05/02/22 -  Kaelyn Velazquez, PT Physical Therapist PT                  PT Recommendation and Plan     Plan of Care Reviewed With: patient, spouse  Outcome Evaluation: Patient seen for PT session this AM. Patient reports she is feeling much better and starting to have more strength and energy for activity. Patient completed all bed mobility with SV this date. Patient performed STS from EOB with SBA. Patient was able to increase ambulation distance within room this date. Patient ambulated with CGA and no AD. Gait slow but mostly steady with no overt LOB noted. Patient on 3L O2 throughout session. O2 sats dropped to 82% following ambulation but recovered >90% with a seated rest break. Patient reclined in chair at end of session. Encouraged patient to continue performed B LE exercises throughout the day independently. Patient agreeable. Acute PT will continue to monitor.     Time Calculation:         PT Charges       Row Name 04/18/24 1147             Time Calculation    Start Time 1047  -      Stop Time 1056  -      Time Calculation (min) 9 min  -      PT Received On 04/18/24  -      PT - Next Appointment 04/19/24  -         Time Calculation- PT    Total Timed Code Minutes- PT 9 minute(s)  -         Timed Charges    15527 - PT Therapeutic Activity Minutes  9  -SM         Total Minutes    Timed Charges Total Minutes 9  -SM       Total Minutes 9  -SM                User Key  (r) = Recorded By, (t) = Taken By, (c) = Cosigned By      Initials Name Provider Type    Kaelyn Soriano PT Physical Therapist                  Therapy Charges for Today       Code Description Service Date Service Provider Modifiers Qty    [id number]  PT THERAPEUTIC ACT EA 15 MIN 4/17/2024 Kaelyn Velazquez, PT GP 1    [id number]  PT THERAPEUTIC ACT EA 15 MIN 4/18/2024 Kaelyn Velazquez, PT GP 1            PT G-Codes  Outcome Measure Options: AM-PAC 6 Clicks Daily Activity (OT)  AM-PAC 6 Clicks Score (PT): 19  AM-PAC 6 Clicks Score (OT): 20       Kaelyn Velazquez PT  4/18/2024

## 2024-04-18 NOTE — THERAPY TREATMENT NOTE
Patient Name: Paz Browne  : 1955    MRN: [id number]                              Today's Date: 2024       Admit Date: 2024    Visit Dx:     ICD-10-CM ICD-9-CM   1. Sepsis, due to unspecified organism, unspecified whether acute organ dysfunction present  A41.9 038.9     995.91   2. Acute on chronic congestive heart failure, unspecified heart failure type  I50.9 428.0   3. Atrial fibrillation with RVR  I48.91 427.31   4. History of atrial fibrillation  Z86.79 V12.59   5. Chronic anticoagulation  Z79.01 V58.61   6. Hypoxia  R09.02 799.02   7. History of COPD  Z87.09 V12.69   8. History of endocarditis  Z86.79 V12.59     Patient Active Problem List   Diagnosis    PAF (paroxysmal atrial fibrillation)    Hypertension    Hyperlipidemia    Pain medication agreement    Hiatal hernia    Primary osteoarthritis involving multiple joints    Pulmonary hypertension    S/P TVR (tricuspid valve repair)    Pulmonary nodule    Restless leg syndrome    Chronic low back pain    Dysplastic polyp of colon    History of mitral valve replacement with tissue graft    Chronic hypoxemic respiratory failure    Anemia    Celiac artery stenosis    Intertrigo    Abnormal EKG    Muscle spasms of both lower extremities    Iron deficiency anemia    Adenomatous polyp of colon    Gastroesophageal reflux disease without esophagitis    Headache    History of endocarditis    Pneumonia of both lower lobes due to infectious organism    Peptic ulcer disease    Peripheral vascular disease    Hyperlipidemia    Hyperglycemia    COPD with acute exacerbation    Chronic diastolic CHF (congestive heart failure)    Chronic bilateral low back pain    COPD exacerbation    Leukocytosis    Elevated troponin    Pneumonia    Acute-on-chronic respiratory failure    Septicemia due to enterococcus    Acute on chronic diastolic congestive heart failure    Sepsis    COPD (chronic obstructive pulmonary disease)     Past Medical History:   Diagnosis Date     VAN (acute kidney injury)     Anemia     Asthma     Atrial flutter     cardioversion    Cataract     Celiac artery stenosis     Chronic respiratory failure with hypoxia     Colon polyp     COPD (chronic obstructive pulmonary disease)     GI bleed     Hiatal hernia     History of CHF (congestive heart failure)     due to MR    History of home oxygen therapy     3 lpm NC    History of mitral valve replacement with tissue graft     Hyperlipidemia     Hypertension     Infectious viral hepatitis     B    Intertrigo     Long term (current) use of anticoagulants     Mitral regurgitation     s/p tissue MVR    PAF (paroxysmal atrial fibrillation)     s/p MAZE    Pneumonia     Pulmonary hypertension     S/P TVR (tricuspid valve repair) 7/7/2016     Past Surgical History:   Procedure Laterality Date    CARDIAC CATHETERIZATION  09/01/2014    Right dominant systemt, normal coronary arteries.     CARDIAC CATHETERIZATION Left 6/10/2016    Procedure: Cardiac catheterization;  Surgeon: Sergei Hall MD;  Location: [facility] CATH INVASIVE LOCATION;  Service:     CARDIAC CATHETERIZATION N/A 6/10/2016    Procedure: Right Heart Cath;  Surgeon: Sergei Hall MD;  [facility]IVE [facility];  Service:     CATARACT EXTRACTION      COLONOSCOPY      COLONOSCOPY N/A 8/4/2017    Procedure: COLONOSCOPY TO CECUM/TI WITH POLYPECTOMY ( COLD BX);  Surgeon: Cleveland Devine MD;  Location: [facility] ENDOSCOPY;  Service:     COLONOSCOPY N/A 8/10/2017    Procedure: COLONOSCOPY to cecum and TI with 2 clips placed at transverse;  Surgeon: Earnest PALOMO MD;  Location: [facility] ENDOSCOPY;  Service:     COLONOSCOPY N/A 12/22/2017    Procedure: COLONOSCOPY INTO CECUM WITH COLD POLYPECTOMIES;  Surgeon: Cleveland Devine MD;  Location: [facility] ENDOSCOPY;  Service:     COLONOSCOPY N/A 5/17/2021    Procedure: COLONOSCOPY to cecum;  Surgeon: Cleveland Devine MD;  Location: [facility] ENDOSCOPY;  Service: Gastroenterology;  Laterality: N/A;  Pre: Fe deficency anemia, h/x  of polyps  Post: fair prep, normal    ENDOSCOPY N/A 8/17/2017    Procedure: ESOPHAGOGASTRODUODENOSCOPY;  Surgeon: Porsha Ruby MD;  Location: [facility] ENDOSCOPY;  Service:     ENDOSCOPY N/A 12/22/2017    Procedure: ESOPHAGOGASTRODUODENOSCOPY WITH BIOPSIES;  Surgeon: Cleveland Devine MD;  Location: [facility] ENDOSCOPY;  Service:     ENDOSCOPY N/A 5/17/2021    Procedure: ESOPHAGOGASTRODUODENOSCOPY  with biopsies;  Surgeon: Cleveland Devine MD;  Location: [facility] ENDOSCOPY;  Service: Gastroenterology;  Laterality: N/A;  Pre: Fe deficency anemia, nausea, heme positive stool   Post: gastritis, sloughing of distal esophagus mucosa    GALLBLADDER SURGERY      HEMORRHOIDECTOMY      HYSTERECTOMY      KIDNEY SURGERY  04/22/2013    Stent placement    MAZE PROCEDURE      MITRAL VALVE REPLACEMENT      TONSILLECTOMY      TRICUSPID VALVE REPLACEMENT        General Information       Row Name 04/18/24 1115          OT Time and Intention    Document Type therapy note (daily note)  -AG     Mode of Treatment occupational therapy  -AG       Row Name 04/18/24 1115          General Information    Patient Profile Reviewed yes  -AG     Existing Precautions/Restrictions fall;oxygen therapy device and L/min  3L  -AG       Row Name 04/18/24 1115          Cognition    Orientation Status (Cognition) oriented x 4  -AG       Row Name 04/18/24 1115          Safety Issues, Functional Mobility    Safety Issues Affecting Function (Mobility) safety precaution awareness  -AG     Impairments Affecting Function (Mobility) endurance/activity tolerance;shortness of breath;strength  -AG               User Key  (r) = Recorded By, (t) = Taken By, (c) = Cosigned By      Initials Name Provider Type    AG Franc Mcdermott OT Occupational Therapist                     Mobility/ADL's       Row Name 04/18/24 1116          Bed Mobility    Bed Mobility supine-sit  -AG     Supine-Sit [place] (Bed Mobility) standby assist  -AG     Assistive Device (Bed Mobility) bed  rails;head of bed elevated  -AG     Comment, (Bed Mobility) Patient UIC at end of session  -AG       Row Name 04/18/24 1116          Transfers    Transfers bed-chair transfer;sit-stand transfer  -AG       Row Name 04/18/24 1116          Bed-Chair Transfer    Bed-Chair [place] (Transfers) standby assist  -AG     Assistive Device (Bed-Chair Transfers) --  no AE use  -AG       Row Name 04/18/24 1116          Sit-Stand Transfer    Sit-Stand [place] (Transfers) standby assist  -AG     Assistive Device (Sit-Stand Transfers) other (see comments)  no AE use  -AG       Row Name 04/18/24 1116          Toilet Transfer    Comment, (Toilet Transfer) completed prior to session  -AG       Row Name 04/18/24 1116          Functional Mobility    Functional Mobility- Comment bed > doorway > recliner w no AE use and cues for pacing and breathing techniques CGA  -AG       Row Name 04/18/24 1116          Activities of Daily Living    BADL Assessment/Intervention lower body dressing;grooming;upper body dressing  -AG       Row Name 04/18/24 1116          Lower Body Dressing Assessment/Training    [place] Level (Lower Body Dressing) lower body dressing skills;don;socks;set up  -AG     Position (Lower Body Dressing) edge of bed sitting  -AG       Row Name 04/18/24 1116          Upper Body Dressing Assessment/Training    [place] Level (Upper Body Dressing) upper body dressing skills;front opening garment;set up  -AG     Position (Upper Body Dressing) edge of bed sitting  -AG       Row Name 04/18/24 1116          Grooming Assessment/Training    [place] Level (Grooming) grooming skills;wash face, hands;set up  -AG     Position (Grooming) supported sitting  -AG               User Key  (r) = Recorded By, (t) = Taken By, (c) = Cosigned By      Initials Name Provider Type    AG Franc Mcdermott OT Occupational Therapist                   Obj/Interventions       Row Name 04/18/24 1121          Balance    Balance Assessment  sitting static balance;sitting dynamic balance;sit to stand dynamic balance;standing static balance;standing dynamic balance  -AG     Static Sitting Balance independent  -AG     Dynamic Sitting Balance modified independence  -AG     Position, Sitting Balance sitting edge of bed  -AG     Sit to Stand Dynamic Balance standby assist  -AG     Static Standing Balance standby assist  -AG     Dynamic Standing Balance contact guard  -AG     Position/Device Used, Standing Balance unsupported  -AG     Balance Interventions sitting;standing;sit to stand;supported;static;dynamic;minimal challenge;occupation based/functional task  -AG     Comment, Balance no LOB noted  -AG               User Key  (r) = Recorded By, (t) = Taken By, (c) = Cosigned By      Initials Name Provider Type    AG Franc Mcdermott, HILARIO Occupational Therapist                   Goals/Plan    No documentation.                  Clinical Impression       Row Name 04/18/24 1121          Pain Assessment    Pretreatment Pain Rating 0/10 - no pain  -AG     Posttreatment Pain Rating 0/10 - no pain  -AG       Row Name 04/18/24 1121          Plan of Care Review    Plan of Care Reviewed With patient;spouse  -AG     Progress improving  -AG     Outcome Evaluation Pt. agreeable to OT session, on 3L NC, pt. transferred to EOB SBA, donned socks s/u EOB and progressed into standing SBA. No AE utilizes this date, CGA for func mob in the room and transferred to the recliner. Pt. deferred need for toileting reporting completing prior to session. Pt. demo's slow and steady progress primarily limited by O2 SATS at this time. OT will continue to follow and progress as able.  -AG       Row Name 04/18/24 1121          Therapy Assessment/Plan (OT)    Rehab Potential (OT) good, to achieve stated therapy goals  -AG     Criteria for Skilled Therapeutic Interventions Met (OT) meets criteria;yes  -AG     Therapy Frequency (OT) 3 times/wk  -AG       Row Name 04/18/24 1121          Therapy  Plan Review/Discharge Plan (OT)    Anticipated Discharge Disposition (OT) home with assist  -AG       Row Name 04/18/24 1121          Vital Signs    Pre SpO2 (%) 96  -AG     O2 Delivery Pre Treatment supplemental O2  3L  -AG     Intra SpO2 (%) 82  -AG     O2 Delivery Intra Treatment supplemental O2  -AG     Post SpO2 (%) 93  -AG     O2 Delivery Post Treatment supplemental O2  -AG     Pre Patient Position Supine  -AG     Intra Patient Position Standing  -AG     Post Patient Position Sitting  -AG       Row Name 04/18/24 1121          Positioning and Restraints    Pre-Treatment Position in bed  -AG     Post Treatment Position chair  -AG     In Chair notified nsg;reclined;call light within reach;encouraged to call for assist;exit alarm on  -AG               User Key  (r) = Recorded By, (t) = Taken By, (c) = Cosigned By      Initials Name Provider Type    Franc Ponce OT Occupational Therapist                   Outcome Measures       Row Name 04/18/24 1128          How much help from another is currently needed...    Putting on and taking off regular lower body clothing? 3  -AG     Bathing (including washing, rinsing, and drying) 3  -AG     Toileting (which includes using toilet bed pan or urinal) 3  -AG     Putting on and taking off regular upper body clothing 3  -AG     Taking care of personal grooming (such as brushing teeth) 4  -AG     Eating meals 4  -AG     AM-PAC 6 Clicks Score (OT) 20  -AG       Row Name 04/18/24 1128          Functional Assessment    Outcome Measure Options AM-PAC 6 Clicks Daily Activity (OT)  -AG               User Key  (r) = Recorded By, (t) = Taken By, (c) = Cosigned By      Initials Name Provider Type    Franc Ponce OT Occupational Therapist                    Occupational Therapy Education       Title: PT OT SLP Therapies (In Progress)       Topic: Occupational Therapy (In Progress)       Point: ADL training (Done)       Description:   Instruct learner(s) on proper safety  adaptation and remediation techniques during self care or transfers.   Instruct in proper use of assistive devices.                  Learning Progress Summary             Patient Acceptance, E, Bed IU by LE at 4/14/2024 1149    Comment: role of OT, plan of care, energy conservation tech, pacing, pursed lip breathing , benefit of activity   Family Acceptance, E, Bed IU by LE at 4/14/2024 1149    Comment: role of OT, plan of care, energy conservation tech, pacing, pursed lip breathing , benefit of activity                         Point: Home exercise program (Not Started)       Description:   Instruct learner(s) on appropriate technique for monitoring, assisting and/or progressing therapeutic exercises/activities.                  Learner Progress:  Not documented in this visit.              Point: Precautions (Done)       Description:   Instruct learner(s) on prescribed precautions during self-care and functional transfers.                  Learning Progress Summary             Patient Acceptance, E, Bed IU by LE at 4/14/2024 1149    Comment: role of OT, plan of care, energy conservation tech, pacing, pursed lip breathing , benefit of activity   Family Acceptance, E, Bed IU by LE at 4/14/2024 1149    Comment: role of OT, plan of care, energy conservation tech, pacing, pursed lip breathing , benefit of activity                         Point: Body mechanics (Done)       Description:   Instruct learner(s) on proper positioning and spine alignment during self-care, functional mobility activities and/or exercises.                  Learning Progress Summary             Patient Acceptance, E, Bed IU by LE at 4/14/2024 1149    Comment: role of OT, plan of care, energy conservation tech, pacing, pursed lip breathing , benefit of activity   Family Acceptance, E, Bed IU by LE at 4/14/2024 1149    Comment: role of OT, plan of care, energy conservation tech, pacing, pursed lip breathing , benefit of activity                                          User Key       Initials Effective Dates Name Provider Type Discipline    LE 06/16/21 -  Merna Barlow, OTR Occupational Therapist OT                  OT Recommendation and Plan  Therapy Frequency (OT): 3 times/wk  Plan of Care Review  Plan of Care Reviewed With: patient, spouse  Progress: improving  Outcome Evaluation: Pt. agreeable to OT session, on 3L NC, pt. transferred to EOB SBA, donned socks s/u EOB and progressed into standing SBA. No AE utilizes this date, CGA for func mob in the room and transferred to the recliner. Pt. deferred need for toileting reporting completing prior to session. Pt. demo's slow and steady progress primarily limited by O2 SATS at this time. OT will continue to follow and progress as able.     Time Calculation:         Time Calculation- OT       Row Name 04/18/24 1129             Time Calculation- OT    OT Start Time 1047  -AG      OT Stop Time 1056  -AG      OT Time Calculation (min) 9 min  -AG      Total Timed Code Minutes- OT 9 minute(s)  -AG      OT Received On 04/18/24  -AG      OT - Next Appointment 04/22/24  -AG      OT Goal Re-Cert Due Date 04/28/24  -AG         Timed Charges    47897 - OT Self Care/Mgmt Minutes 9  -AG         Total Minutes    Timed Charges Total Minutes 9  -AG       Total Minutes 9  -AG                User Key  (r) = Recorded By, (t) = Taken By, (c) = Cosigned By      Initials Name Provider Type    AG Franc Mcdermott OT Occupational Therapist                  Therapy Charges for Today       Code Description Service Date Service Provider Modifiers Qty    84991751061 HC OT SELF CARE/MGMT/TRAIN EA 15 MIN 4/18/2024 Franc Mcdermott OT GO 1                 Franc Mcdermott OT  4/18/2024

## 2024-04-18 NOTE — PLAN OF CARE
Goal Outcome Evaluation:  Plan of Care Reviewed With: patient, spouse           Outcome Evaluation: Patient seen for PT session this AM. Patient reports she is feeling much better and starting to have more strength and energy for activity. Patient completed all bed mobility with SV this date. Patient performed STS from EOB with SBA. Patient was able to increase ambulation distance within room this date. Patient ambulated with CGA and no AD. Gait slow but mostly steady with no overt LOB noted. Patient on 3L O2 throughout session. O2 sats dropped to 82% following ambulation but recovered >90% with a seated rest break. Patient reclined in chair at end of session. Encouraged patient to continue performed B LE exercises throughout the day independently. Patient agreeable. Acute PT will continue to monitor.

## 2024-04-18 NOTE — PLAN OF CARE
Goal Outcome Evaluation:      Pt AO x 4. 3L o2 nc, sr on monitor, administered meds per mar, narco x 2, adequate urine out put on canister, vss, family at bedside, no ss of acute distress noted, will continue to monitor.         Problem: Adult Inpatient Plan of Care  Goal: Absence of Hospital-Acquired Illness or Injury  Intervention: Identify and Manage Fall Risk  Recent Flowsheet Documentation  Taken 4/18/2024 0601 by Marisol Camarillo RN  Safety Promotion/Fall Prevention:   activity supervised   safety round/check completed   room organization consistent  Taken 4/18/2024 0401 by Marisol Camarillo RN  Safety Promotion/Fall Prevention:   activity supervised   room organization consistent   safety round/check completed  Taken 4/18/2024 0201 by Marisol Camarillo RN  Safety Promotion/Fall Prevention:   activity supervised   room organization consistent   safety round/check completed  Taken 4/18/2024 0010 by Marisol Camarillo RN  Safety Promotion/Fall Prevention:   activity supervised   room organization consistent   safety round/check completed  Taken 4/17/2024 2208 by Marisol Camarillo RN  Safety Promotion/Fall Prevention:   activity supervised   room organization consistent   safety round/check completed  Taken 4/17/2024 2001 by Marisol Camarillo RN  Safety Promotion/Fall Prevention:   activity supervised   room organization consistent   safety round/check completed  Intervention: Prevent Skin Injury  Recent Flowsheet Documentation  Taken 4/18/2024 0601 by Marisol Camarillo RN  Body Position: position changed independently  Taken 4/18/2024 0401 by Marisol Camarillo RN  Body Position: position changed independently  Taken 4/18/2024 0201 by Marisol Camarillo RN  Body Position: position changed independently  Taken 4/18/2024 0010 by Marisol Camarillo RN  Body Position: position changed independently  Skin Protection: adhesive use limited  Taken 4/17/2024 2208 by Marisol Camarillo RN  Body Position: position  changed independently  Taken 4/17/2024 2001 by Marisol Camarillo RN  Body Position: position changed independently  Skin Protection: adhesive use limited  Intervention: Prevent and Manage VTE (Venous Thromboembolism) Risk  Recent Flowsheet Documentation  Taken 4/18/2024 0010 by Marisol Camarillo RN  Activity Management: bedrest  Range of Motion: active ROM (range of motion) encouraged  Taken 4/17/2024 2001 by Marisol Camarillo RN  Activity Management: bedrest  VTE Prevention/Management: other (see comments)  Range of Motion: active ROM (range of motion) encouraged  Intervention: Prevent Infection  Recent Flowsheet Documentation  Taken 4/18/2024 0601 by Marisol Camarillo RN  Infection Prevention:   hand hygiene promoted   rest/sleep promoted  Taken 4/18/2024 0401 by Marisol Camarillo RN  Infection Prevention:   hand hygiene promoted   personal protective equipment utilized   rest/sleep promoted  Taken 4/18/2024 0201 by Marisol Camarillo RN  Infection Prevention:   hand hygiene promoted   rest/sleep promoted  Taken 4/18/2024 0010 by Marisol Camarillo RN  Infection Prevention:   rest/sleep promoted   hand hygiene promoted  Taken 4/17/2024 2208 by Marisol Camarillo RN  Infection Prevention:   hand hygiene promoted   rest/sleep promoted  Taken 4/17/2024 2001 by Marisol Camarillo RN  Infection Prevention:   hand hygiene promoted   single patient room provided   rest/sleep promoted  Goal: Optimal Comfort and Wellbeing  Intervention: Monitor Pain and Promote Comfort  Recent Flowsheet Documentation  Taken 4/17/2024 2001 by Marisol Camarillo RN  Pain Management Interventions:   see MAR   quiet environment facilitated  Intervention: Provide Person-Centered Care  Recent Flowsheet Documentation  Taken 4/18/2024 0010 by Marisol Camarillo RN  Trust Relationship/Rapport: care explained  Taken 4/17/2024 2001 by Marisol Camarillo RN  Trust Relationship/Rapport:   care explained   choices provided     Problem: Adjustment  to Illness (Sepsis/Septic Shock)  Goal: Optimal Coping  Intervention: Optimize Psychosocial Adjustment to Illness  Recent Flowsheet Documentation  Taken 4/18/2024 0010 by Marisol Camarillo RN  Supportive Measures: active listening utilized  Family/Support System Care: support provided  Taken 4/17/2024 2001 by Marisol Camarillo RN  Supportive Measures: active listening utilized     Problem: Infection Progression (Sepsis/Septic Shock)  Goal: Absence of Infection Signs and Symptoms  Intervention: Initiate Sepsis Management  Recent Flowsheet Documentation  Taken 4/18/2024 0601 by Marisol Camarillo RN  Infection Prevention:   hand hygiene promoted   rest/sleep promoted  Taken 4/18/2024 0401 by Marisol Camarillo RN  Infection Prevention:   hand hygiene promoted   personal protective equipment utilized   rest/sleep promoted  Isolation Precautions: precautions maintained  Taken 4/18/2024 0201 by Marisol Camarillo RN  Infection Prevention:   hand hygiene promoted   rest/sleep promoted  Isolation Precautions: precautions initiated  Taken 4/18/2024 0010 by Marisol Camarillo RN  Infection Management: aseptic technique maintained  Infection Prevention:   rest/sleep promoted   hand hygiene promoted  Isolation Precautions: precautions maintained  Taken 4/17/2024 2208 by Marisol Camarillo RN  Infection Prevention:   hand hygiene promoted   rest/sleep promoted  Taken 4/17/2024 2001 by Marisol Camarillo RN  Infection Management: aseptic technique maintained  Infection Prevention:   hand hygiene promoted   single patient room provided   rest/sleep promoted  Isolation Precautions: precautions maintained  Intervention: Promote Recovery  Recent Flowsheet Documentation  Taken 4/18/2024 0010 by Marisol Camarillo RN  Activity Management: bedrest  Airway/Ventilation Support: comfort measures provided  Sleep/Rest Enhancement: awakenings minimized  Taken 4/17/2024 2001 by Marisol Camarillo RN  Activity Management:  bedrest  Intervention: Promote Stabilization  Recent Flowsheet Documentation  Taken 4/18/2024 0010 by Marisol Camarillo RN  Lung Protection Measures: fluid excess minimized     Problem: Heart Failure Comorbidity  Goal: Maintenance of Heart Failure Symptom Control  Intervention: Maintain Heart Failure-Management  Recent Flowsheet Documentation  Taken 4/18/2024 0601 by Marisol Camarillo RN  Medication Review/Management: medications reviewed  Taken 4/18/2024 0401 by Marisol Camarillo RN  Medication Review/Management: medications reviewed  Taken 4/18/2024 0201 by Marisol Camarillo RN  Medication Review/Management: medications reviewed  Taken 4/18/2024 0010 by Marisol Camarillo RN  Medication Review/Management: medications reviewed  Taken 4/17/2024 2208 by Marisol Camarillo RN  Medication Review/Management: medications reviewed  Taken 4/17/2024 2001 by Marisol Camarillo RN  Medication Review/Management: medications reviewed     Problem: Hypertension Comorbidity  Goal: Blood Pressure in Desired Range  Intervention: Maintain Blood Pressure Management  Recent Flowsheet Documentation  Taken 4/18/2024 0601 by Marisol Camarillo RN  Medication Review/Management: medications reviewed  Taken 4/18/2024 0401 by Marisol Camarillo RN  Medication Review/Management: medications reviewed  Taken 4/18/2024 0201 by Marisol Camarillo RN  Medication Review/Management: medications reviewed  Taken 4/18/2024 0010 by Marisol Camarillo RN  Syncope Management: position changed slowly  Medication Review/Management: medications reviewed  Taken 4/17/2024 2208 by Marisol Camarillo RN  Medication Review/Management: medications reviewed  Taken 4/17/2024 2001 by Marisol Camarillo RN  Syncope Management: position changed slowly  Medication Review/Management: medications reviewed     Problem: Pain Chronic (Persistent) (Comorbidity Management)  Goal: Acceptable Pain Control and Functional Ability  Intervention: Manage Persistent Pain  Recent  Flowsheet Documentation  Taken 4/18/2024 0601 by Marisol Camarillo RN  Medication Review/Management: medications reviewed  Taken 4/18/2024 0401 by Marisol Camarillo RN  Medication Review/Management: medications reviewed  Taken 4/18/2024 0201 by Marisol Camarillo RN  Medication Review/Management: medications reviewed  Taken 4/18/2024 0010 by Marisol Camarillo RN  Sleep/Rest Enhancement: awakenings minimized  Medication Review/Management: medications reviewed  Taken 4/17/2024 2208 by Marisol Camarillo RN  Medication Review/Management: medications reviewed  Taken 4/17/2024 2001 by Marisol Camarillo RN  Medication Review/Management: medications reviewed  Intervention: Develop Pain Management Plan  Recent Flowsheet Documentation  Taken 4/17/2024 2001 by Marisol Camarillo RN  Pain Management Interventions:   see MAR   quiet environment facilitated  Intervention: Optimize Psychosocial Wellbeing  Recent Flowsheet Documentation  Taken 4/18/2024 0010 by Marisol Camarillo RN  Supportive Measures: active listening utilized  Diversional Activities: television  Family/Support System Care: support provided  Taken 4/17/2024 2001 by Marisol Camarillo RN  Supportive Measures: active listening utilized  Diversional Activities: television     Problem: Skin Injury Risk Increased  Goal: Skin Health and Integrity  Intervention: Promote and Optimize Oral Intake  Recent Flowsheet Documentation  Taken 4/18/2024 0010 by Marisol Camarillo RN  Oral Nutrition Promotion: rest periods promoted  Taken 4/17/2024 2001 by Marisol Camarillo RN  Oral Nutrition Promotion: rest periods promoted  Intervention: Optimize Skin Protection  Recent Flowsheet Documentation  Taken 4/18/2024 0601 by Marisol Camarillo RN  Head of Bed (HOB) Positioning: HOB elevated  Taken 4/18/2024 0401 by Marisol Camarillo RN  Head of Bed (HOB) Positioning: HOB elevated  Taken 4/18/2024 0201 by Marisol Camarillo RN  Head of Bed (HOB) Positioning: HOB elevated  Taken  4/18/2024 0010 by Marisol Camarillo RN  Pressure Reduction Techniques:   frequent weight shift encouraged   weight shift assistance provided  Head of Bed (HOB) Positioning: HOB elevated  Pressure Reduction Devices:   alternating pressure pump (ADD)   pressure-redistributing mattress utilized  Skin Protection: adhesive use limited  Taken 4/17/2024 2208 by Marisol Camarillo RN  Head of Bed (HOB) Positioning: HOB elevated  Taken 4/17/2024 2001 by Mairsol Camarillo RN  Pressure Reduction Techniques:   frequent weight shift encouraged   weight shift assistance provided  Head of Bed (HOB) Positioning: HOB elevated  Pressure Reduction Devices:   alternating pressure pump (ADD)   pressure-redistributing mattress utilized  Skin Protection: adhesive use limited     Problem: Fall Injury Risk  Goal: Absence of Fall and Fall-Related Injury  Intervention: Identify and Manage Contributors  Recent Flowsheet Documentation  Taken 4/18/2024 0601 by Marisol Camarillo RN  Medication Review/Management: medications reviewed  Taken 4/18/2024 0401 by Marisol Camarillo RN  Medication Review/Management: medications reviewed  Taken 4/18/2024 0201 by Marisol Camarillo RN  Medication Review/Management: medications reviewed  Taken 4/18/2024 0010 by Marisol Camarillo RN  Medication Review/Management: medications reviewed  Taken 4/17/2024 2208 by Marisol Camarillo RN  Medication Review/Management: medications reviewed  Taken 4/17/2024 2001 by Mraisol Camarillo RN  Medication Review/Management: medications reviewed  Intervention: Promote Injury-Free Environment  Recent Flowsheet Documentation  Taken 4/18/2024 0601 by Marisol Camarillo RN  Safety Promotion/Fall Prevention:   activity supervised   safety round/check completed   room organization consistent  Taken 4/18/2024 0401 by Marisol Camarillo RN  Safety Promotion/Fall Prevention:   activity supervised   room organization consistent   safety round/check completed  Taken 4/18/2024 0201  by Marisol Camarillo, RN  Safety Promotion/Fall Prevention:   activity supervised   room organization consistent   safety round/check completed  Taken 4/18/2024 0010 by Marisol Camarillo RN  Safety Promotion/Fall Prevention:   activity supervised   room organization consistent   safety round/check completed  Taken 4/17/2024 2208 by Marisol Camarillo, RN  Safety Promotion/Fall Prevention:   activity supervised   room organization consistent   safety round/check completed  Taken 4/17/2024 2001 by Marisol Camarillo, RN  Safety Promotion/Fall Prevention:   activity supervised   room organization consistent   safety round/check completed

## 2024-04-18 NOTE — PROGRESS NOTES
Name: Paz Browne ADMIT: 2024   : 1955  PCP: Javan Martinez MD    MRN: [id number] LOS: 7 days   AGE/SEX: 68 y.o. female  ROOM: [facility]     Subjective   Subjective   No new complaints, feeling better, denies shortness of breath..    Review of Systems   As above  Objective   Objective   Vital Signs  Temp:  [97.3 °F (36.3 °C)-98.6 °F (37 °C)] 97.3 °F (36.3 °C)  Heart Rate:  [] 102  Resp:  [16-20] 18  BP: (128-150)/(64-83) 150/83  SpO2:  [91 %-97 %] 97 %  on  Flow (L/min):  [3] 3;   Device (Oxygen Therapy): humidified;nasal cannula  Body mass index is 21.82 kg/m².  Physical Exam    General: Alert, sitting up in the bed, not in distress,  HEENT: Normocephalic, atraumatic  CV: Irregular rhythm, tachycardic  Lungs: Diminished, no wheezing, on 3 L nasal cannula  Abdomen: Soft, nontender, nondistended  Extremities: No significant peripheral edema , no cyanosis     Results Review     I reviewed the patient's new clinical results.  Results from last 7 days   Lab Units 24  0737 24  0724  0648 04/15/24  1825 04/15/24  0509   WBC 10*3/mm3 14.98* 13.43* 12.74*  --  8.48   HEMOGLOBIN g/dL 9.3* 8.5* 8.5* 9.1* 8.3*   PLATELETS 10*3/mm3 254 234 223  --  206     Results from last 7 days   Lab Units 24  0737 24  0717 24  0648 04/15/24  1649 04/15/24  0509   SODIUM mmol/L 145 142 144  --  142   POTASSIUM mmol/L 3.2* 3.7 3.3* 3.8 3.4*   CHLORIDE mmol/L 97* 92* 94*  --  94*   CO2 mmol/L 37.6* 41.1* 40.7*  --  38.0*   BUN mg/dL 19 18 12  --  9   CREATININE mg/dL 0.95 0.82 0.73  --  0.68   GLUCOSE mg/dL 114* 137* 122*  --  128*   Estimated Creatinine Clearance: 55 mL/min (by C-G formula based on SCr of 0.95 mg/dL).  Results from last 7 days   Lab Units 24  0648 24  0550 24  0533   ALBUMIN g/dL 3.3* 3.3* 3.5   BILIRUBIN mg/dL 0.4 0.5 0.5   ALK PHOS U/L 55 66 69   AST (SGOT) U/L 16 23 24   ALT (SGPT) U/L 11 15 17     Results from last 7 days   Lab Units  "04/18/24  0737 04/17/24  0717 04/16/24  0648 04/15/24  0509 04/14/24  0545 04/13/24 2047 04/13/24  0550 04/12/24  0533   CALCIUM mg/dL 9.0 8.9 8.6 8.8   < >  --  8.6 7.9*   ALBUMIN g/dL  --   --  3.3*  --   --   --  3.3* 3.5   MAGNESIUM mg/dL 2.4 2.1  --   --   --  2.0  --   --    PHOSPHORUS mg/dL 4.3 3.2 2.9  --   --  3.0  --   --     < > = values in this interval not displayed.     Results from last 7 days   Lab Units 04/14/24  0545 04/11/24  1938   PROCALCITONIN ng/mL 0.05  --    LACTATE mmol/L  --  0.9     COVID19   Date Value Ref Range Status   04/11/2024 Not Detected Not Detected - Ref. Range Final   03/31/2024 Not Detected Not Detected - Ref. Range Final     No results found for: \"HGBA1C\", \"POCGLU\"        XR Chest 1 View  Narrative: XR CHEST 1 VW-4/14/2024     HISTORY: Shortness of breath.     Heart size is at the upper limits of normal. There is bilateral  interstitial and alveolar fluid consistent with pulmonary edema. There  could be an element of pneumonia in the lung bases. Bilateral pleural  effusions are seen. Sternotomy wires are seen. Left upper extremity PICC  line is seen in good position.     No pneumothorax is seen.     Impression: 1. FINDINGS consistent with bilateral pulmonary edema and pleural  effusions. These findings may have progressed slightly since the  4/11/2024 study.        This report was finalized on 4/14/2024 2:22 PM by Dr. Donis Guzmán M.D on Workstation: [id number]       Scheduled Medications  acetaZOLAMIDE, 250 mg, Intravenous, Q8H  ampicillin, 2 g, Intravenous, Q4H  [Held by provider] apixaban, 5 mg, Oral, Q12H  atorvastatin, 40 mg, Oral, Daily  budesonide, 0.5 mg, Nebulization, BID - RT  bumetanide, 2 mg, Intravenous, Q8H  cefTRIAXone (ROCEPHIN) 2,000 mg in sodium chloride 0.9 % 100 mL MBP, 2,000 mg, Intravenous, Q12H  cetirizine, 10 mg, Oral, Daily  cyclobenzaprine, 10 mg, Oral, Nightly  dilTIAZem, 60 mg, Oral, Q6H  methylPREDNISolone sodium succinate, 40 mg, " Intravenous, Q12H  multivitamin with minerals, 1 tablet, Oral, Daily  nystatin, 5 mL, Oral, 4x Daily  pantoprazole, 40 mg, Intravenous, Q12H  roflumilast, 500 mcg, Oral, Daily  rOPINIRole, 2 mg, Oral, Nightly  sertraline, 100 mg, Oral, Daily  sodium chloride, 10 mL, Intravenous, Q12H  sodium chloride, 10 mL, Intravenous, Q12H  spironolactone, 25 mg, Oral, Daily    Infusions   Diet  Diet: Cardiac, Fluid Restriction (240 mL/tray); Healthy Heart (2-3 Na+); 1500 mL/day; Fluid Consistency: Thin (IDDSI 0)  NPO Diet NPO Type: Strict NPO    I have personally reviewed     [x]  Laboratory   []  Microbiology   []  Radiology   []  EKG/Telemetry  []  Cardiology/Vascular   []  Pathology    []  Records       Assessment/Plan     Active Hospital Problems    Diagnosis  POA    **Acute on chronic diastolic congestive heart failure [I50.33]  Yes    COPD (chronic obstructive pulmonary disease) [J44.9]  Unknown    Septicemia due to enterococcus [A41.81]  Yes    Pneumonia [J18.9]  Yes    Iron deficiency anemia [D50.9]  Yes    Chronic hypoxemic respiratory failure [J96.11]  Yes    PAF (paroxysmal atrial fibrillation) [I48.0]  Yes      Resolved Hospital Problems   No resolved problems to display.       68 y.o. female with COPD, chronic hypoxic respiratory failure on 4 l nasal cannula, chronic diastolic CHF and A-fib with recent hospitalization for enterococcal bacteremia discharged with IV antibiotics now admitted with worsening dyspnea      Active chronic heart failure with preserved ejection fraction  History of MV replacement  -Echocardiogram 04/4/24 showed EF of 61 to 65%  -On IV Bumex, cardiology managing        Hypokalemia  -Potassium 3.2, replacement per protocol ordered      Severe COPD/chronic hypoxic respiratory failure on 3- 4 L nasal cannula at baseline  -On IV steroids, budesonide nebulizer  -Roflumilast  -Pulmonology managing  -Oxygen requirements improved,  -      A-fib with RVR  -On diltiazem, apixaban on hold due to  melena    Enterococcus septicemia  -Diagnosed during recent hospitalization from 4/2/2024 - 4/5/2024.    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  At discharge ampicillin dosing to be  changed to 12 g every 24 hours via continuous infusion.  -WBC trending up, but likely due to steroids.      Melena/acute on chronic anemia  -Hemoglobin dropped to 6.9 on 04/12/2024, status post 1 unit of PRBC transfused  -Eliquis on hold  -Continue IV PPI  -GI following, tentative plan for EGD/colonoscopy once cleared by cardiology/pulmonology standpoint  -Monitor CBC daily  -Hemoglobin improved      SCDs for DVT prophylaxis.  Full code.  Discussed with patient, family, and care team on multidisciplinary rounds.  Anticipate discharge home, timing yet to be determined      Copied text in this note has been reviewed and is accurate as of 04/18/24.         Dictated utilizing Dragon dictation        Arina Castaneda MD  [facility]  04/18/24  18:44 EDT

## 2024-04-19 ENCOUNTER — ANESTHESIA (OUTPATIENT)
Dept: GASTROENTEROLOGY | Facility: HOSPITAL | Age: 69
End: 2024-04-19
Payer: MEDICARE

## 2024-04-19 ENCOUNTER — ANESTHESIA EVENT (OUTPATIENT)
Dept: GASTROENTEROLOGY | Facility: HOSPITAL | Age: 69
End: 2024-04-19
Payer: MEDICARE

## 2024-04-19 LAB
ANION GAP SERPL CALCULATED.3IONS-SCNC: 13.7 MMOL/L (ref 5–15)
BUN SERPL-MCNC: 25 MG/DL (ref 8–23)
BUN/CREAT SERPL: 26.3 (ref 7–25)
CALCIUM SPEC-SCNC: 9 MG/DL (ref 8.6–10.5)
CHLORIDE SERPL-SCNC: 99 MMOL/L (ref 98–107)
CO2 SERPL-SCNC: 31.3 MMOL/L (ref 22–29)
CREAT SERPL-MCNC: 0.95 MG/DL (ref 0.57–1)
DEPRECATED RDW RBC AUTO: 54.3 FL (ref 37–54)
EGFRCR SERPLBLD CKD-EPI 2021: 64.6 ML/MIN/1.73
ERYTHROCYTE [DISTWIDTH] IN BLOOD BY AUTOMATED COUNT: 16.5 % (ref 12.3–15.4)
GLUCOSE SERPL-MCNC: 118 MG/DL (ref 65–99)
HCT VFR BLD AUTO: 30.6 % (ref 34–46.6)
HGB BLD-MCNC: 9.4 G/DL (ref 12–15.9)
MAGNESIUM SERPL-MCNC: 2.4 MG/DL (ref 1.6–2.4)
MCH RBC QN AUTO: 27.6 PG (ref 26.6–33)
MCHC RBC AUTO-ENTMCNC: 30.7 G/DL (ref 31.5–35.7)
MCV RBC AUTO: 89.7 FL (ref 79–97)
PHOSPHATE SERPL-MCNC: 3.5 MG/DL (ref 2.5–4.5)
PLATELET # BLD AUTO: 268 10*3/MM3 (ref 140–450)
PMV BLD AUTO: 10.1 FL (ref 6–12)
POTASSIUM SERPL-SCNC: 3.7 MMOL/L (ref 3.5–5.2)
RBC # BLD AUTO: 3.41 10*6/MM3 (ref 3.77–5.28)
SODIUM SERPL-SCNC: 144 MMOL/L (ref 136–145)
WBC NRBC COR # BLD AUTO: 15.12 10*3/MM3 (ref 3.4–10.8)

## 2024-04-19 PROCEDURE — 80048 BASIC METABOLIC PNL TOTAL CA: CPT | Performed by: STUDENT IN AN ORGANIZED HEALTH CARE EDUCATION/TRAINING PROGRAM

## 2024-04-19 PROCEDURE — 25010000002 ONDANSETRON PER 1 MG: Performed by: INTERNAL MEDICINE

## 2024-04-19 PROCEDURE — 25010000002 AMPICILLIN PER 500 MG: Performed by: NURSE PRACTITIONER

## 2024-04-19 PROCEDURE — 25010000002 BUMETANIDE PER 0.5 MG: Performed by: INTERNAL MEDICINE

## 2024-04-19 PROCEDURE — 25010000002 PROPOFOL 10 MG/ML EMULSION: Performed by: NURSE ANESTHETIST, CERTIFIED REGISTERED

## 2024-04-19 PROCEDURE — 94799 UNLISTED PULMONARY SVC/PX: CPT

## 2024-04-19 PROCEDURE — 99232 SBSQ HOSP IP/OBS MODERATE 35: CPT | Performed by: INTERNAL MEDICINE

## 2024-04-19 PROCEDURE — 83735 ASSAY OF MAGNESIUM: CPT | Performed by: STUDENT IN AN ORGANIZED HEALTH CARE EDUCATION/TRAINING PROGRAM

## 2024-04-19 PROCEDURE — 43239 EGD BIOPSY SINGLE/MULTIPLE: CPT | Performed by: INTERNAL MEDICINE

## 2024-04-19 PROCEDURE — 25010000002 METHYLPREDNISOLONE PER 40 MG: Performed by: HOSPITALIST

## 2024-04-19 PROCEDURE — 25810000003 SODIUM CHLORIDE 0.9 % SOLUTION: Performed by: INTERNAL MEDICINE

## 2024-04-19 PROCEDURE — 94664 DEMO&/EVAL PT USE INHALER: CPT

## 2024-04-19 PROCEDURE — 85027 COMPLETE CBC AUTOMATED: CPT | Performed by: STUDENT IN AN ORGANIZED HEALTH CARE EDUCATION/TRAINING PROGRAM

## 2024-04-19 PROCEDURE — 0DB48ZX EXCISION OF ESOPHAGOGASTRIC JUNCTION, VIA NATURAL OR ARTIFICIAL OPENING ENDOSCOPIC, DIAGNOSTIC: ICD-10-PCS | Performed by: INTERNAL MEDICINE

## 2024-04-19 PROCEDURE — 94761 N-INVAS EAR/PLS OXIMETRY MLT: CPT

## 2024-04-19 PROCEDURE — 25010000002 CEFTRIAXONE PER 250 MG: Performed by: INTERNAL MEDICINE

## 2024-04-19 PROCEDURE — 25010000002 ACETAZOLAMIDE PER 500 MG: Performed by: INTERNAL MEDICINE

## 2024-04-19 PROCEDURE — 84100 ASSAY OF PHOSPHORUS: CPT | Performed by: STUDENT IN AN ORGANIZED HEALTH CARE EDUCATION/TRAINING PROGRAM

## 2024-04-19 PROCEDURE — 25010000002 AMPICILLIN PER 500 MG: Performed by: INTERNAL MEDICINE

## 2024-04-19 PROCEDURE — 88305 TISSUE EXAM BY PATHOLOGIST: CPT | Performed by: INTERNAL MEDICINE

## 2024-04-19 PROCEDURE — 94760 N-INVAS EAR/PLS OXIMETRY 1: CPT

## 2024-04-19 RX ORDER — SODIUM CHLORIDE 0.9 % (FLUSH) 0.9 %
10 SYRINGE (ML) INJECTION AS NEEDED
Status: DISCONTINUED | OUTPATIENT
Start: 2024-04-19 | End: 2024-04-21 | Stop reason: HOSPADM

## 2024-04-19 RX ORDER — SODIUM CHLORIDE 0.9 % (FLUSH) 0.9 %
20 SYRINGE (ML) INJECTION AS NEEDED
Status: DISCONTINUED | OUTPATIENT
Start: 2024-04-19 | End: 2024-04-21 | Stop reason: HOSPADM

## 2024-04-19 RX ORDER — PANTOPRAZOLE SODIUM 40 MG/1
40 TABLET, DELAYED RELEASE ORAL
Status: DISCONTINUED | OUTPATIENT
Start: 2024-04-20 | End: 2024-04-21 | Stop reason: HOSPADM

## 2024-04-19 RX ORDER — SODIUM CHLORIDE 9 MG/ML
30 INJECTION, SOLUTION INTRAVENOUS CONTINUOUS PRN
Status: DISCONTINUED | OUTPATIENT
Start: 2024-04-19 | End: 2024-04-21 | Stop reason: HOSPADM

## 2024-04-19 RX ORDER — LIDOCAINE HYDROCHLORIDE 20 MG/ML
INJECTION, SOLUTION INFILTRATION; PERINEURAL AS NEEDED
Status: DISCONTINUED | OUTPATIENT
Start: 2024-04-19 | End: 2024-04-19 | Stop reason: SURG

## 2024-04-19 RX ORDER — PROPOFOL 10 MG/ML
VIAL (ML) INTRAVENOUS AS NEEDED
Status: DISCONTINUED | OUTPATIENT
Start: 2024-04-19 | End: 2024-04-19 | Stop reason: SURG

## 2024-04-19 RX ORDER — SODIUM CHLORIDE 0.9 % (FLUSH) 0.9 %
10 SYRINGE (ML) INJECTION EVERY 12 HOURS SCHEDULED
Status: DISCONTINUED | OUTPATIENT
Start: 2024-04-19 | End: 2024-04-21 | Stop reason: HOSPADM

## 2024-04-19 RX ORDER — PREDNISONE 20 MG/1
40 TABLET ORAL
Status: DISCONTINUED | OUTPATIENT
Start: 2024-04-20 | End: 2024-04-20

## 2024-04-19 RX ADMIN — DILTIAZEM HYDROCHLORIDE 60 MG: 60 TABLET, FILM COATED ORAL at 06:10

## 2024-04-19 RX ADMIN — HYDROCODONE BITARTRATE AND ACETAMINOPHEN 1 TABLET: 7.5; 325 TABLET ORAL at 04:18

## 2024-04-19 RX ADMIN — ROFLUMILAST 500 MCG: 500 TABLET ORAL at 10:39

## 2024-04-19 RX ADMIN — PROPOFOL 50 MG: 10 INJECTION, EMULSION INTRAVENOUS at 09:51

## 2024-04-19 RX ADMIN — Medication 10 ML: at 20:54

## 2024-04-19 RX ADMIN — POLYETHYLENE GLYCOL 3350 AND ELECTROLYTES WITH LEMON FLAVOR 2000 ML: 236; 22.74; 6.74; 5.86; 2.97 POWDER, FOR SOLUTION ORAL at 20:52

## 2024-04-19 RX ADMIN — BUDESONIDE 0.5 MG: 0.5 INHALANT RESPIRATORY (INHALATION) at 21:35

## 2024-04-19 RX ADMIN — NYSTATIN 500000 UNITS: 100000 SUSPENSION ORAL at 10:40

## 2024-04-19 RX ADMIN — ACETAZOLAMIDE SODIUM 250 MG: 500 INJECTION, POWDER, LYOPHILIZED, FOR SOLUTION INTRAVENOUS at 08:09

## 2024-04-19 RX ADMIN — ONDANSETRON 4 MG: 2 INJECTION INTRAMUSCULAR; INTRAVENOUS at 08:20

## 2024-04-19 RX ADMIN — ROPINIROLE 2 MG: 2 TABLET, FILM COATED ORAL at 20:52

## 2024-04-19 RX ADMIN — BUMETANIDE 2 MG: 0.25 INJECTION, SOLUTION INTRAMUSCULAR; INTRAVENOUS at 02:19

## 2024-04-19 RX ADMIN — CEFTRIAXONE 2000 MG: 2 INJECTION, POWDER, FOR SOLUTION INTRAMUSCULAR; INTRAVENOUS at 08:01

## 2024-04-19 RX ADMIN — BUMETANIDE 2 MG: 0.25 INJECTION, SOLUTION INTRAMUSCULAR; INTRAVENOUS at 08:10

## 2024-04-19 RX ADMIN — PROPOFOL 60 MG: 10 INJECTION, EMULSION INTRAVENOUS at 09:47

## 2024-04-19 RX ADMIN — SERTRALINE 100 MG: 100 TABLET, FILM COATED ORAL at 10:39

## 2024-04-19 RX ADMIN — AMPICILLIN SODIUM 2 G: 2 INJECTION, POWDER, FOR SOLUTION INTRAVENOUS at 02:19

## 2024-04-19 RX ADMIN — LIDOCAINE HYDROCHLORIDE 60 MG: 20 INJECTION, SOLUTION INFILTRATION; PERINEURAL at 09:47

## 2024-04-19 RX ADMIN — Medication 10 ML: at 11:27

## 2024-04-19 RX ADMIN — DILTIAZEM HYDROCHLORIDE 60 MG: 60 TABLET, FILM COATED ORAL at 10:38

## 2024-04-19 RX ADMIN — AMPICILLIN SODIUM 2 G: 2 INJECTION, POWDER, FOR SOLUTION INTRAVENOUS at 10:38

## 2024-04-19 RX ADMIN — ATORVASTATIN CALCIUM 40 MG: 20 TABLET, FILM COATED ORAL at 10:39

## 2024-04-19 RX ADMIN — IPRATROPIUM BROMIDE AND ALBUTEROL SULFATE 3 ML: .5; 3 SOLUTION RESPIRATORY (INHALATION) at 07:22

## 2024-04-19 RX ADMIN — NYSTATIN 500000 UNITS: 100000 SUSPENSION ORAL at 20:51

## 2024-04-19 RX ADMIN — AMPICILLIN SODIUM 2 G: 2 INJECTION, POWDER, FOR SOLUTION INTRAVENOUS at 06:09

## 2024-04-19 RX ADMIN — HYDROCODONE BITARTRATE AND ACETAMINOPHEN 1 TABLET: 7.5; 325 TABLET ORAL at 17:23

## 2024-04-19 RX ADMIN — Medication 3 MG: at 22:12

## 2024-04-19 RX ADMIN — SODIUM CHLORIDE 30 ML/HR: 9 INJECTION, SOLUTION INTRAVENOUS at 09:22

## 2024-04-19 RX ADMIN — CEFTRIAXONE 2000 MG: 2 INJECTION, POWDER, FOR SOLUTION INTRAMUSCULAR; INTRAVENOUS at 20:51

## 2024-04-19 RX ADMIN — METHYLPREDNISOLONE SODIUM SUCCINATE 40 MG: 40 INJECTION, POWDER, FOR SOLUTION INTRAMUSCULAR; INTRAVENOUS at 04:19

## 2024-04-19 RX ADMIN — SPIRONOLACTONE 25 MG: 25 TABLET, FILM COATED ORAL at 10:39

## 2024-04-19 RX ADMIN — Medication 1 TABLET: at 10:39

## 2024-04-19 RX ADMIN — HYDROCODONE BITARTRATE AND ACETAMINOPHEN 1 TABLET: 7.5; 325 TABLET ORAL at 23:37

## 2024-04-19 RX ADMIN — HYDROCODONE BITARTRATE AND ACETAMINOPHEN 1 TABLET: 7.5; 325 TABLET ORAL at 10:39

## 2024-04-19 RX ADMIN — DILTIAZEM HYDROCHLORIDE 60 MG: 60 TABLET, FILM COATED ORAL at 22:12

## 2024-04-19 RX ADMIN — Medication 10 ML: at 20:53

## 2024-04-19 RX ADMIN — ALBUTEROL SULFATE 2.5 MG: 2.5 SOLUTION RESPIRATORY (INHALATION) at 00:15

## 2024-04-19 RX ADMIN — ONDANSETRON 4 MG: 2 INJECTION INTRAMUSCULAR; INTRAVENOUS at 20:55

## 2024-04-19 RX ADMIN — AMPICILLIN SODIUM 2 G: 2 INJECTION, POWDER, FOR SOLUTION INTRAVENOUS at 17:23

## 2024-04-19 RX ADMIN — AMPICILLIN SODIUM 2 G: 2 INJECTION, POWDER, FOR SOLUTION INTRAVENOUS at 22:12

## 2024-04-19 RX ADMIN — CETIRIZINE HYDROCHLORIDE 10 MG: 10 TABLET ORAL at 10:38

## 2024-04-19 RX ADMIN — BUDESONIDE 0.5 MG: 0.5 INHALANT RESPIRATORY (INHALATION) at 07:25

## 2024-04-19 RX ADMIN — AMPICILLIN SODIUM 2 G: 2 INJECTION, POWDER, FOR SOLUTION INTRAVENOUS at 13:13

## 2024-04-19 RX ADMIN — POLYETHYLENE GLYCOL 3350 AND ELECTROLYTES WITH LEMON FLAVOR 2000 ML: 236; 22.74; 6.74; 5.86; 2.97 POWDER, FOR SOLUTION ORAL at 18:25

## 2024-04-19 RX ADMIN — CYCLOBENZAPRINE 10 MG: 10 TABLET, FILM COATED ORAL at 20:51

## 2024-04-19 RX ADMIN — DILTIAZEM HYDROCHLORIDE 60 MG: 60 TABLET, FILM COATED ORAL at 17:23

## 2024-04-19 RX ADMIN — PANTOPRAZOLE SODIUM 40 MG: 40 INJECTION, POWDER, FOR SOLUTION INTRAVENOUS at 08:00

## 2024-04-19 RX ADMIN — Medication 10 ML: at 08:01

## 2024-04-19 NOTE — PLAN OF CARE
Goal Outcome Evaluation:           Progress: improving  Outcome Evaluation: Pt AOx4. On 2L NC. AFIB on monitor. EGD today showed esophagitits. Doing a cscope tomorrow- bowel prep tonight. Now on clear liquid diet. IV diruectics continued and IV abx per MAR. IV steriods changed to PO. Purewick in place. Pt not in acute distress. C/o pain in back- pain meds per MAR. Plan of care ongoing    Pt did not want to start bowel prep so late- so pt started bowel prep at 1820 this evening

## 2024-04-19 NOTE — ANESTHESIA POSTPROCEDURE EVALUATION
"Patient: Paz Browne    Procedure Summary       Date: 04/19/24 Room / Location: [facility] ENDOSCOPY 6 /  [facility]    Anesthesia Start: 0946 Anesthesia Stop: 1006    Procedure: ESOPHAGOGASTRODUODENOSCOPY WITH BIOPSY (Esophagus) Diagnosis:       Iron deficiency anemia, unspecified iron deficiency anemia type      (Iron deficiency anemia, unspecified iron deficiency anemia type [D50.9])    Surgeons: Rick Morales MD Provider: Olvin Sloan MD    Anesthesia Type: MAC ASA Status: 4            Anesthesia Type: MAC    Vitals  Vitals Value Taken Time   /69 04/19/24 1017   Temp     Pulse 92 04/19/24 1017   Resp 18 04/19/24 1017   SpO2 99 % 04/19/24 1017           Post Anesthesia Care and Evaluation    Level of consciousness: awake and alert  Pain management: adequate  Anesthetic complications: No anesthetic complications    Cardiovascular status: acceptable  Respiratory status: acceptable  Hydration status: acceptable    Comments: /69 (BP Location: Left arm, Patient Position: Sitting)   Pulse 92   Temp 36.4 °C (97.5 °F) (Oral)   Resp 18   Ht 170.2 cm (67\")   Wt 63.1 kg (139 lb 1.8 oz)   SpO2 99%   BMI 21.79 kg/m²       "

## 2024-04-19 NOTE — PLAN OF CARE
Goal Outcome Evaluation:      Patient resting well at this time with her  at the bedside who is attentive and helpful towards her needs. VSS, patient continues with IV antibiotics without any adverse side effects. Lungs are improved but still with some diminished breath sounds. She is voiding well and continues with IV diuretics, urine is clear and yellow.

## 2024-04-19 NOTE — PROGRESS NOTES
"   LOS: 8 days   Patient Care Team:  Javan Martinez MD as PCP - General (Internal Medicine)  Ag Melo Jr., MD as Consulting Physician (Pulmonary Disease)  Cleveland Devine MD as Consulting Physician (Gastroenterology)  Earnest Gan MD as Consulting Physician (Cardiology)  Daniela Shin MD PhD as Consulting Physician (Hematology and Oncology)    Chief Complaint: SOA     Interval History: Still w good UOP, improved oxygenation. Had EGD this AM that showed esophagitis, no CV complications. Will be having colon prep today and Cscope in AM.       Objective   Vital Signs  Temp:  [97.3 °F (36.3 °C)-97.9 °F (36.6 °C)] 97.7 °F (36.5 °C)  Heart Rate:  [] 96  Resp:  [16-18] 18  BP: (120-143)/(50-82) 122/53    Intake/Output Summary (Last 24 hours) at 4/19/2024 1544  Last data filed at 4/19/2024 1508  Gross per 24 hour   Intake 780 ml   Output 3000 ml   Net -2220 ml       Last Weight and Admission Weight        04/19/24  0529   Weight: 63.1 kg (139 lb 1.8 oz)     Flowsheet Rows      Flowsheet Row First Filed Value   Admission Height 170.2 cm (67\") Documented at 04/11/2024 1752   Admission Weight 59 kg (130 lb) Documented at 04/11/2024 1752            Physical Exam  HENT:      Head: Normocephalic.      Mouth/Throat:      Pharynx: Oropharynx is clear.   Eyes:      Conjunctiva/sclera: Conjunctivae normal.   Cardiovascular:      Rate and Rhythm: Normal rate. Rhythm irregular.   Pulmonary:      Breath sounds: Decreased air movement present.   Abdominal:      Palpations: Abdomen is soft.      Tenderness: There is no abdominal tenderness.   Musculoskeletal:         General: No swelling.   Neurological:      General: No focal deficit present.       Results Review:      Results from last 7 days   Lab Units 04/19/24  0422 04/18/24  1855 04/18/24  0737 04/17/24  0717   SODIUM mmol/L 144  --  145 142   POTASSIUM mmol/L 3.7 3.7 3.2* 3.7   CHLORIDE mmol/L 99  --  97* 92*   CO2 mmol/L 31.3*  --  37.6* 41.1*   BUN " mg/dL 25*  --  19 18   CREATININE mg/dL 0.95  --  0.95 0.82   GLUCOSE mg/dL 118*  --  114* 137*   CALCIUM mg/dL 9.0  --  9.0 8.9         Results from last 7 days   Lab Units 04/19/24  0422 04/18/24  0737 04/17/24  0717   WBC 10*3/mm3 15.12* 14.98* 13.43*   HEMOGLOBIN g/dL 9.4* 9.3* 8.5*   HEMATOCRIT % 30.6* 30.2* 28.1*   PLATELETS 10*3/mm3 268 254 234             Results from last 7 days   Lab Units 04/19/24  0422   MAGNESIUM mg/dL 2.4           I reviewed the patient's new clinical results.  I personally viewed and interpreted the patient's EKG/Telemetry data        Medication Review:   ampicillin, 2 g, Intravenous, Q4H  [Held by provider] apixaban, 5 mg, Oral, Q12H  atorvastatin, 40 mg, Oral, Daily  budesonide, 0.5 mg, Nebulization, BID - RT  bumetanide, 2 mg, Intravenous, Q8H  cefTRIAXone (ROCEPHIN) 2,000 mg in sodium chloride 0.9 % 100 mL MBP, 2,000 mg, Intravenous, Q12H  cetirizine, 10 mg, Oral, Daily  cyclobenzaprine, 10 mg, Oral, Nightly  dilTIAZem, 60 mg, Oral, Q6H  multivitamin with minerals, 1 tablet, Oral, Daily  nystatin, 5 mL, Oral, 4x Daily  [START ON 4/20/2024] pantoprazole, 40 mg, Oral, Q AM  polyethylene glycol, 2,000 mL, Oral, Q8H  [START ON 4/20/2024] predniSONE, 40 mg, Oral, Daily With Breakfast  roflumilast, 500 mcg, Oral, Daily  rOPINIRole, 2 mg, Oral, Nightly  sertraline, 100 mg, Oral, Daily  sodium chloride, 10 mL, Intravenous, Q12H  sodium chloride, 10 mL, Intravenous, Q12H  sodium chloride, 10 mL, Intravenous, Q12H  sodium chloride, 10 mL, Intravenous, Q12H  spironolactone, 25 mg, Oral, Daily    sodium chloride, 30 mL/hr, Last Rate: 30 mL/hr (04/19/24 0922)        Assessment & Plan     1. Acute on chronic HFpEF  2. PAF  3. Chronic hypoxemic respiratory failure/COPD  4. Enterococcus bacteremia  5. History of MV replacement  6. Anemia requiring transfusion    *O2 requirement is returning to baseline, she remains SOA but definitely feels better with diuresis, and clinically she is improving.      *Continue to encourage fluid restriction     *She has had several days of fairly intense diuresis (bumetanide 2mg VI q8h and 3 days of IV acetazolamide). As she will be NPO for two days in a row and getting a Cscope prep tonight, I'll hold diuretics for now and then resume oral diuretics once she's taking PO again tomorrow. Continue spironolactone.     *HR is reasonably controlled on diltiazem, avoiding BB due to lung disease, apixaban held due to GIB concerns. Need to resume as soon as able.     *I feel that her CV status is as acceptable as it will be for endoscopy    Rodolfo Null MD  04/19/24  15:44 EDT

## 2024-04-19 NOTE — ANESTHESIA PREPROCEDURE EVALUATION
Anesthesia Evaluation     Patient summary reviewed and Nursing notes reviewed   no history of anesthetic complications:   NPO Solid Status: > 8 hours  NPO Liquid Status: > 2 hours           Airway   Mallampati: II  TM distance: >3 FB  Neck ROM: full  No difficulty expected  Dental    (+) edentulous    Pulmonary    (+) a smoker Former, COPD severe, asthma,    ROS comment: On 3L home O2  Cardiovascular     ECG reviewed  Rhythm: irregular  Rate: normal    (+) hypertension 2 medications or greater, valvular problems/murmurs MR, dysrhythmias Paroxysmal Atrial Fib, CHF , PVD, hyperlipidemia    ROS comment: Hx MVR/TV repair/Maze in 2014/PAF/EF 64%, bioprosthetic MV, mod MR, mild to mod TR by ECHO yesterday  PE comment: Afib/normal VR    Neuro/Psych  (+) headaches  GI/Hepatic/Renal/Endo    (+) hiatal hernia, GERD, PUD, GI bleeding , hepatitis, liver disease, renal disease-    Musculoskeletal     (+) back pain, chronic pain  Abdominal    Substance History      OB/GYN          Other   arthritis, blood dyscrasia anemia,       Other Comment: Hgb 7.3  ROS/Med Hx Other: Hgb 9.4                    Anesthesia Plan    ASA 4     MAC     intravenous induction     Anesthetic plan, risks, benefits, and alternatives have been provided, discussed and informed consent has been obtained with: patient.        CODE STATUS:    Code Status (Patient has no pulse and is not breathing): CPR (Attempt to Resuscitate)  Medical Interventions (Patient has pulse or is breathing): Full

## 2024-04-19 NOTE — PROGRESS NOTES
Name: Paz Browne ADMIT: 2024   : 1955  PCP: Javan Martinez MD    MRN: [id number] LOS: 8 days   AGE/SEX: 68 y.o. female  ROOM: ENDO/ENDO     Subjective   Subjective   No events overnight.  No new complaints.  Denies shortness of breath.  N.p.o. after midnight, plan for EGD today.  Review of Systems   As above  Objective   Objective   Vital Signs  Temp:  [97.3 °F (36.3 °C)-97.9 °F (36.6 °C)] 97.5 °F (36.4 °C)  Heart Rate:  [] 92  Resp:  [16-20] 16  BP: (120-150)/(58-83) 143/62  SpO2:  [91 %-100 %] 99 %  on  Flow (L/min):  [1-3] 2;   Device (Oxygen Therapy): room air  Body mass index is 21.79 kg/m².  Physical Exam    General: Laying in bed, not in distress,  HEENT: Normocephalic, atraumatic  CV: Irregular rhythm, normal rate  Lungs: Diminished bilaterally, no wheezing, on 2 L nasal cannula  Abdomen: Soft, nontender, nondistended  Extremities: No significant peripheral edema , no cyanosis     Results Review     I reviewed the patient's new clinical results.  Results from last 7 days   Lab Units 24  04224  0737 24  0724  0648   WBC 10*3/mm3 15.12* 14.98* 13.43* 12.74*   HEMOGLOBIN g/dL 9.4* 9.3* 8.5* 8.5*   PLATELETS 10*3/mm3 268 254 234 223     Results from last 7 days   Lab Units 24  04224  1855 24  0737 24  0717 24  0648   SODIUM mmol/L 144  --  145 142 144   POTASSIUM mmol/L 3.7 3.7 3.2* 3.7 3.3*   CHLORIDE mmol/L 99  --  97* 92* 94*   CO2 mmol/L 31.3*  --  37.6* 41.1* 40.7*   BUN mg/dL 25*  --  19 18 12   CREATININE mg/dL 0.95  --  0.95 0.82 0.73   GLUCOSE mg/dL 118*  --  114* 137* 122*   Estimated Creatinine Clearance: 54.9 mL/min (by C-G formula based on SCr of 0.95 mg/dL).  Results from last 7 days   Lab Units 24  0648 24  0550   ALBUMIN g/dL 3.3* 3.3*   BILIRUBIN mg/dL 0.4 0.5   ALK PHOS U/L 55 66   AST (SGOT) U/L 16 23   ALT (SGPT) U/L 11 15     Results from last 7 days   Lab Units 24  0422  "04/18/24  0737 04/17/24  0717 04/16/24  0648 04/14/24  0545 04/13/24 2047 04/13/24  0550   0000   CALCIUM mg/dL 9.0 9.0 8.9 8.6   < >  --  8.6  --    ALBUMIN g/dL  --   --   --  3.3*  --   --  3.3*  --    MAGNESIUM mg/dL 2.4 2.4 2.1  --   --  2.0  --   --    PHOSPHORUS mg/dL 3.5 4.3 3.2 2.9  --  3.0  --    < >    < > = values in this interval not displayed.     Results from last 7 days   Lab Units 04/14/24  0545   PROCALCITONIN ng/mL 0.05     COVID19   Date Value Ref Range Status   04/11/2024 Not Detected Not Detected - Ref. Range Final   03/31/2024 Not Detected Not Detected - Ref. Range Final     No results found for: \"HGBA1C\", \"POCGLU\"        XR Chest 1 View  Narrative: XR CHEST 1 VW-4/14/2024     HISTORY: Shortness of breath.     Heart size is at the upper limits of normal. There is bilateral  interstitial and alveolar fluid consistent with pulmonary edema. There  could be an element of pneumonia in the lung bases. Bilateral pleural  effusions are seen. Sternotomy wires are seen. Left upper extremity PICC  line is seen in good position.     No pneumothorax is seen.     Impression: 1. FINDINGS consistent with bilateral pulmonary edema and pleural  effusions. These findings may have progressed slightly since the  4/11/2024 study.        This report was finalized on 4/14/2024 2:22 PM by Dr. Donis Guzmán M.D on Workstation: [id number]       Scheduled Medications  ampicillin, 2 g, Intravenous, Q4H  [Held by provider] apixaban, 5 mg, Oral, Q12H  atorvastatin, 40 mg, Oral, Daily  budesonide, 0.5 mg, Nebulization, BID - RT  bumetanide, 2 mg, Intravenous, Q8H  cefTRIAXone (ROCEPHIN) 2,000 mg in sodium chloride 0.9 % 100 mL MBP, 2,000 mg, Intravenous, Q12H  cetirizine, 10 mg, Oral, Daily  cyclobenzaprine, 10 mg, Oral, Nightly  dilTIAZem, 60 mg, Oral, Q6H  multivitamin with minerals, 1 tablet, Oral, Daily  nystatin, 5 mL, Oral, 4x Daily  pantoprazole, 40 mg, Intravenous, Q12H  roflumilast, 500 mcg, Oral, " Daily  rOPINIRole, 2 mg, Oral, Nightly  sertraline, 100 mg, Oral, Daily  sodium chloride, 10 mL, Intravenous, Q12H  sodium chloride, 10 mL, Intravenous, Q12H  spironolactone, 25 mg, Oral, Daily    Infusions  sodium chloride, 30 mL/hr, Last Rate: 30 mL/hr (04/19/24 0922)    Diet  NPO Diet NPO Type: Strict NPO    I have personally reviewed     [x]  Laboratory   []  Microbiology   []  Radiology   []  EKG/Telemetry  []  Cardiology/Vascular   []  Pathology    []  Records       Assessment/Plan     Active Hospital Problems    Diagnosis  POA    **Acute on chronic diastolic congestive heart failure [I50.33]  Yes    COPD (chronic obstructive pulmonary disease) [J44.9]  Unknown    Septicemia due to enterococcus [A41.81]  Yes    Pneumonia [J18.9]  Yes    Iron deficiency anemia [D50.9]  Yes    Chronic hypoxemic respiratory failure [J96.11]  Yes    PAF (paroxysmal atrial fibrillation) [I48.0]  Yes      Resolved Hospital Problems   No resolved problems to display.       68 y.o. female with COPD, chronic hypoxic respiratory failure on 4 l nasal cannula, chronic diastolic CHF and A-fib with recent hospitalization for enterococcal bacteremia discharged with IV antibiotics now admitted with worsening dyspnea      Active chronic heart failure with preserved ejection fraction  History of MV replacement  -Echocardiogram 04/4/24 showed EF of 61 to 65%  -On IV Bumex, cardiology managing        Hypokalemia  3.7, continue ereplacement per protocol      Severe COPD/chronic hypoxic respiratory failure on 3- 4 L nasal cannula at baseline  -budesonide nebulizer  -Roflumilast  -Pulmonology evaluated and signed off  -Oxygen requirements improved,  -Discontinue IV steroids, transition to p.o prednisone 40 mg daily starting tomorrow we will plan to taper      A-fib with RVR  -On diltiazem, apixaban on hold due to melena    Enterococcus septicemia  -Diagnosed during recent hospitalization from 4/2/2024 - 4/5/2024.    -Patient patient underwent TTE  and CHIO during last admission which showed showed a stable, known, small, echodense, mobile mass on the posterior leaflet of the prosthetic mitral valve  -ID evaluated, plan to continue previous plan of antibiotics with  ampicillin 2 g IV every 4 hours and ceftriaxone 2 g IV every 12 hours for 6 weeks course with stop date 5/13/2024.  At discharge ampicillin dosing to be  changed to 12 g every 24 hours via continuous infusion.  -WBC trending up, but likely due to steroids.      Melena/acute on chronic anemia  -Hemoglobin dropped to 6.9 on 04/12/2024, status post 1 unit of PRBC transfused  -Eliquis on hold  -Continue IV PPI  -Hemoglobin improved, 9.4 today  -GI following, for EGD today        SCDs for DVT prophylaxis.  Full code.  Discussed with patient, family, and care team on multidisciplinary rounds.  Anticipate discharge home, timing yet to be determined      Copied text in this note has been reviewed and is accurate as of 04/19/24.         Dictated utilizing Dragon dictation        Arina Castaneda MD  [facility]  04/19/24  09:58 EDT

## 2024-04-20 ENCOUNTER — ANESTHESIA EVENT (OUTPATIENT)
Dept: GASTROENTEROLOGY | Facility: HOSPITAL | Age: 69
End: 2024-04-20
Payer: MEDICARE

## 2024-04-20 ENCOUNTER — ANESTHESIA (OUTPATIENT)
Dept: GASTROENTEROLOGY | Facility: HOSPITAL | Age: 69
End: 2024-04-20
Payer: MEDICARE

## 2024-04-20 PROBLEM — K20.90 ESOPHAGITIS: Status: ACTIVE | Noted: 2024-04-20

## 2024-04-20 LAB
ANION GAP SERPL CALCULATED.3IONS-SCNC: 10.4 MMOL/L (ref 5–15)
BUN SERPL-MCNC: 19 MG/DL (ref 8–23)
BUN/CREAT SERPL: 26.4 (ref 7–25)
CALCIUM SPEC-SCNC: 8.5 MG/DL (ref 8.6–10.5)
CHLORIDE SERPL-SCNC: 102 MMOL/L (ref 98–107)
CO2 SERPL-SCNC: 32.6 MMOL/L (ref 22–29)
CREAT SERPL-MCNC: 0.72 MG/DL (ref 0.57–1)
DEPRECATED RDW RBC AUTO: 51.2 FL (ref 37–54)
EGFRCR SERPLBLD CKD-EPI 2021: 90.1 ML/MIN/1.73
ERYTHROCYTE [DISTWIDTH] IN BLOOD BY AUTOMATED COUNT: 16.4 % (ref 12.3–15.4)
GLUCOSE SERPL-MCNC: 94 MG/DL (ref 65–99)
HCT VFR BLD AUTO: 29.2 % (ref 34–46.6)
HGB BLD-MCNC: 9.1 G/DL (ref 12–15.9)
MAGNESIUM SERPL-MCNC: 2.2 MG/DL (ref 1.6–2.4)
MCH RBC QN AUTO: 27.1 PG (ref 26.6–33)
MCHC RBC AUTO-ENTMCNC: 31.2 G/DL (ref 31.5–35.7)
MCV RBC AUTO: 86.9 FL (ref 79–97)
PHOSPHATE SERPL-MCNC: 2.5 MG/DL (ref 2.5–4.5)
PLATELET # BLD AUTO: 269 10*3/MM3 (ref 140–450)
PMV BLD AUTO: 9.6 FL (ref 6–12)
POTASSIUM SERPL-SCNC: 3 MMOL/L (ref 3.5–5.2)
RBC # BLD AUTO: 3.36 10*6/MM3 (ref 3.77–5.28)
SODIUM SERPL-SCNC: 145 MMOL/L (ref 136–145)
WBC NRBC COR # BLD AUTO: 16.28 10*3/MM3 (ref 3.4–10.8)

## 2024-04-20 PROCEDURE — 0DBP8ZX EXCISION OF RECTUM, VIA NATURAL OR ARTIFICIAL OPENING ENDOSCOPIC, DIAGNOSTIC: ICD-10-PCS | Performed by: INTERNAL MEDICINE

## 2024-04-20 PROCEDURE — 63710000001 PREDNISONE PER 1 MG: Performed by: STUDENT IN AN ORGANIZED HEALTH CARE EDUCATION/TRAINING PROGRAM

## 2024-04-20 PROCEDURE — 25810000003 SODIUM CHLORIDE 0.9 % SOLUTION: Performed by: INTERNAL MEDICINE

## 2024-04-20 PROCEDURE — 94761 N-INVAS EAR/PLS OXIMETRY MLT: CPT

## 2024-04-20 PROCEDURE — 25010000002 AMPICILLIN PER 500 MG: Performed by: INTERNAL MEDICINE

## 2024-04-20 PROCEDURE — 0DBN8ZX EXCISION OF SIGMOID COLON, VIA NATURAL OR ARTIFICIAL OPENING ENDOSCOPIC, DIAGNOSTIC: ICD-10-PCS | Performed by: INTERNAL MEDICINE

## 2024-04-20 PROCEDURE — 45381 COLONOSCOPY SUBMUCOUS NJX: CPT | Performed by: INTERNAL MEDICINE

## 2024-04-20 PROCEDURE — 25010000002 ONDANSETRON PER 1 MG: Performed by: INTERNAL MEDICINE

## 2024-04-20 PROCEDURE — 99232 SBSQ HOSP IP/OBS MODERATE 35: CPT | Performed by: INTERNAL MEDICINE

## 2024-04-20 PROCEDURE — 0W3P8ZZ CONTROL BLEEDING IN GASTROINTESTINAL TRACT, VIA NATURAL OR ARTIFICIAL OPENING ENDOSCOPIC: ICD-10-PCS | Performed by: INTERNAL MEDICINE

## 2024-04-20 PROCEDURE — 88305 TISSUE EXAM BY PATHOLOGIST: CPT | Performed by: INTERNAL MEDICINE

## 2024-04-20 PROCEDURE — 0DBK8ZX EXCISION OF ASCENDING COLON, VIA NATURAL OR ARTIFICIAL OPENING ENDOSCOPIC, DIAGNOSTIC: ICD-10-PCS | Performed by: INTERNAL MEDICINE

## 2024-04-20 PROCEDURE — 85027 COMPLETE CBC AUTOMATED: CPT | Performed by: INTERNAL MEDICINE

## 2024-04-20 PROCEDURE — 25010000002 CEFTRIAXONE PER 250 MG: Performed by: INTERNAL MEDICINE

## 2024-04-20 PROCEDURE — 0DBL8ZX EXCISION OF TRANSVERSE COLON, VIA NATURAL OR ARTIFICIAL OPENING ENDOSCOPIC, DIAGNOSTIC: ICD-10-PCS | Performed by: INTERNAL MEDICINE

## 2024-04-20 PROCEDURE — 94799 UNLISTED PULMONARY SVC/PX: CPT

## 2024-04-20 PROCEDURE — 45388 COLONOSCOPY W/ABLATION: CPT | Performed by: INTERNAL MEDICINE

## 2024-04-20 PROCEDURE — 63710000001 ONDANSETRON ODT 4 MG TABLET DISPERSIBLE: Performed by: INTERNAL MEDICINE

## 2024-04-20 PROCEDURE — 84100 ASSAY OF PHOSPHORUS: CPT | Performed by: INTERNAL MEDICINE

## 2024-04-20 PROCEDURE — 80048 BASIC METABOLIC PNL TOTAL CA: CPT | Performed by: INTERNAL MEDICINE

## 2024-04-20 PROCEDURE — 45380 COLONOSCOPY AND BIOPSY: CPT | Performed by: INTERNAL MEDICINE

## 2024-04-20 PROCEDURE — 25810000003 LACTATED RINGERS PER 1000 ML: Performed by: ANESTHESIOLOGY

## 2024-04-20 PROCEDURE — 45385 COLONOSCOPY W/LESION REMOVAL: CPT | Performed by: INTERNAL MEDICINE

## 2024-04-20 PROCEDURE — 25010000002 PROPOFOL 200 MG/20ML EMULSION: Performed by: ANESTHESIOLOGY

## 2024-04-20 PROCEDURE — 83735 ASSAY OF MAGNESIUM: CPT | Performed by: INTERNAL MEDICINE

## 2024-04-20 DEVICE — WORKING LENGTH 235CM, WORKING CHANNEL 2.8MM
Type: IMPLANTABLE DEVICE | Site: TRANSVERSE COLON | Status: FUNCTIONAL
Brand: RESOLUTION 360 CLIP

## 2024-04-20 RX ORDER — SODIUM CHLORIDE 9 MG/ML
1000 INJECTION, SOLUTION INTRAVENOUS CONTINUOUS
Status: CANCELLED | OUTPATIENT
Start: 2024-04-20

## 2024-04-20 RX ORDER — PROPOFOL 10 MG/ML
INJECTION, EMULSION INTRAVENOUS CONTINUOUS PRN
Status: DISCONTINUED | OUTPATIENT
Start: 2024-04-20 | End: 2024-04-20 | Stop reason: SURG

## 2024-04-20 RX ORDER — PREDNISONE 20 MG/1
20 TABLET ORAL
Status: DISCONTINUED | OUTPATIENT
Start: 2024-04-20 | End: 2024-04-21 | Stop reason: HOSPADM

## 2024-04-20 RX ORDER — POTASSIUM CHLORIDE 750 MG/1
40 TABLET, FILM COATED, EXTENDED RELEASE ORAL EVERY 4 HOURS
Status: DISCONTINUED | OUTPATIENT
Start: 2024-04-20 | End: 2024-04-20

## 2024-04-20 RX ORDER — SODIUM CHLORIDE 0.9 % (FLUSH) 0.9 %
10 SYRINGE (ML) INJECTION AS NEEDED
Status: CANCELLED | OUTPATIENT
Start: 2024-04-20

## 2024-04-20 RX ORDER — BUMETANIDE 2 MG/1
2 TABLET ORAL
Status: DISCONTINUED | OUTPATIENT
Start: 2024-04-20 | End: 2024-04-21 | Stop reason: HOSPADM

## 2024-04-20 RX ORDER — POTASSIUM CHLORIDE 1.5 G/1.58G
40 POWDER, FOR SOLUTION ORAL EVERY 4 HOURS
Status: COMPLETED | OUTPATIENT
Start: 2024-04-20 | End: 2024-04-20

## 2024-04-20 RX ORDER — SODIUM CHLORIDE, SODIUM LACTATE, POTASSIUM CHLORIDE, CALCIUM CHLORIDE 600; 310; 30; 20 MG/100ML; MG/100ML; MG/100ML; MG/100ML
INJECTION, SOLUTION INTRAVENOUS CONTINUOUS PRN
Status: DISCONTINUED | OUTPATIENT
Start: 2024-04-20 | End: 2024-04-20 | Stop reason: SURG

## 2024-04-20 RX ADMIN — NYSTATIN 500000 UNITS: 100000 SUSPENSION ORAL at 21:51

## 2024-04-20 RX ADMIN — Medication 1 TABLET: at 12:54

## 2024-04-20 RX ADMIN — CYCLOBENZAPRINE 10 MG: 10 TABLET, FILM COATED ORAL at 21:51

## 2024-04-20 RX ADMIN — AMPICILLIN SODIUM 2 G: 2 INJECTION, POWDER, FOR SOLUTION INTRAVENOUS at 02:49

## 2024-04-20 RX ADMIN — CEFTRIAXONE 2000 MG: 2 INJECTION, POWDER, FOR SOLUTION INTRAMUSCULAR; INTRAVENOUS at 12:03

## 2024-04-20 RX ADMIN — ATORVASTATIN CALCIUM 40 MG: 20 TABLET, FILM COATED ORAL at 12:53

## 2024-04-20 RX ADMIN — PROPOFOL 50 MCG/KG/MIN: 10 INJECTION, EMULSION INTRAVENOUS at 09:42

## 2024-04-20 RX ADMIN — POTASSIUM CHLORIDE 40 MEQ: 1.5 POWDER, FOR SOLUTION ORAL at 21:53

## 2024-04-20 RX ADMIN — HYDROCODONE BITARTRATE AND ACETAMINOPHEN 1 TABLET: 7.5; 325 TABLET ORAL at 12:05

## 2024-04-20 RX ADMIN — SERTRALINE 100 MG: 100 TABLET, FILM COATED ORAL at 12:54

## 2024-04-20 RX ADMIN — SPIRONOLACTONE 25 MG: 25 TABLET, FILM COATED ORAL at 12:55

## 2024-04-20 RX ADMIN — SODIUM CHLORIDE 30 ML/HR: 9 INJECTION, SOLUTION INTRAVENOUS at 08:48

## 2024-04-20 RX ADMIN — Medication 10 ML: at 23:01

## 2024-04-20 RX ADMIN — AMPICILLIN SODIUM 2 G: 2 INJECTION, POWDER, FOR SOLUTION INTRAVENOUS at 18:34

## 2024-04-20 RX ADMIN — ALBUTEROL SULFATE 2.5 MG: 2.5 SOLUTION RESPIRATORY (INHALATION) at 14:43

## 2024-04-20 RX ADMIN — AMPICILLIN SODIUM 2 G: 2 INJECTION, POWDER, FOR SOLUTION INTRAVENOUS at 14:52

## 2024-04-20 RX ADMIN — Medication 3 MG: at 21:50

## 2024-04-20 RX ADMIN — PREDNISONE 20 MG: 20 TABLET ORAL at 12:54

## 2024-04-20 RX ADMIN — Medication 10 ML: at 14:57

## 2024-04-20 RX ADMIN — HYDROCODONE BITARTRATE AND ACETAMINOPHEN 1 TABLET: 7.5; 325 TABLET ORAL at 21:51

## 2024-04-20 RX ADMIN — ROPINIROLE 2 MG: 2 TABLET, FILM COATED ORAL at 21:50

## 2024-04-20 RX ADMIN — AMPICILLIN SODIUM 2 G: 2 INJECTION, POWDER, FOR SOLUTION INTRAVENOUS at 21:52

## 2024-04-20 RX ADMIN — POTASSIUM CHLORIDE 40 MEQ: 750 TABLET, EXTENDED RELEASE ORAL at 12:51

## 2024-04-20 RX ADMIN — ROFLUMILAST 500 MCG: 500 TABLET ORAL at 12:54

## 2024-04-20 RX ADMIN — CETIRIZINE HYDROCHLORIDE 10 MG: 10 TABLET ORAL at 12:54

## 2024-04-20 RX ADMIN — ONDANSETRON 4 MG: 2 INJECTION INTRAMUSCULAR; INTRAVENOUS at 12:05

## 2024-04-20 RX ADMIN — NYSTATIN 500000 UNITS: 100000 SUSPENSION ORAL at 12:55

## 2024-04-20 RX ADMIN — Medication 10 ML: at 23:02

## 2024-04-20 RX ADMIN — DILTIAZEM HYDROCHLORIDE 60 MG: 60 TABLET, FILM COATED ORAL at 16:36

## 2024-04-20 RX ADMIN — DILTIAZEM HYDROCHLORIDE 60 MG: 60 TABLET, FILM COATED ORAL at 23:01

## 2024-04-20 RX ADMIN — DILTIAZEM HYDROCHLORIDE 60 MG: 60 TABLET, FILM COATED ORAL at 12:54

## 2024-04-20 RX ADMIN — DILTIAZEM HYDROCHLORIDE 60 MG: 60 TABLET, FILM COATED ORAL at 05:59

## 2024-04-20 RX ADMIN — POTASSIUM CHLORIDE 40 MEQ: 1.5 POWDER, FOR SOLUTION ORAL at 16:36

## 2024-04-20 RX ADMIN — AMPICILLIN SODIUM 2 G: 2 INJECTION, POWDER, FOR SOLUTION INTRAVENOUS at 05:59

## 2024-04-20 RX ADMIN — BUDESONIDE 0.5 MG: 0.5 INHALANT RESPIRATORY (INHALATION) at 22:30

## 2024-04-20 RX ADMIN — SODIUM CHLORIDE, POTASSIUM CHLORIDE, SODIUM LACTATE AND CALCIUM CHLORIDE: 600; 310; 30; 20 INJECTION, SOLUTION INTRAVENOUS at 09:40

## 2024-04-20 RX ADMIN — CEFTRIAXONE 2000 MG: 2 INJECTION, POWDER, FOR SOLUTION INTRAMUSCULAR; INTRAVENOUS at 21:51

## 2024-04-20 RX ADMIN — Medication 10 ML: at 12:03

## 2024-04-20 RX ADMIN — ONDANSETRON 4 MG: 4 TABLET, ORALLY DISINTEGRATING ORAL at 23:01

## 2024-04-20 RX ADMIN — BUMETANIDE 2 MG: 2 TABLET ORAL at 21:50

## 2024-04-20 RX ADMIN — PANTOPRAZOLE SODIUM 40 MG: 40 TABLET, DELAYED RELEASE ORAL at 05:59

## 2024-04-20 NOTE — PLAN OF CARE
Goal Outcome Evaluation:  Plan of Care Reviewed With: patient, spouse           Outcome Evaluation: Colonospcy completed. No visible signs of bleeding. Mildly short of breath with activity. 02 3L NC. K+ replacement. Dual PICC line intact. Telemetry A-fib w/ PVC's and short runs of PVC's.

## 2024-04-20 NOTE — ANESTHESIA POSTPROCEDURE EVALUATION
Patient: Paz Browne    Procedure Summary       Date: 04/20/24 Room / Location: [facility] ENDOSCOPY 1 / [facility] ENDOSCOPY    Anesthesia Start: 0940 Anesthesia Stop: 1025    Procedure: COLONOSCOPY to cecum with cold and hot snare polypectomies with saline lift and APC Diagnosis:       Anemia, unspecified type      (Anemia, unspecified type [D64.9])    Surgeons: David Payne MD Provider: Oneil Hunter MD    Anesthesia Type: MAC ASA Status: 4            Anesthesia Type: MAC    Vitals  Vitals Value Taken Time   /50 04/20/24 1021   Temp     Pulse 107 04/20/24 1027   Resp 18 04/20/24 1021   SpO2 96 % 04/20/24 1027   Vitals shown include unfiled device data.        Post Anesthesia Care and Evaluation    Patient location during evaluation: PACU  Patient participation: complete - patient participated  Level of consciousness: awake and alert  Pain management: adequate    Airway patency: patent  Anesthetic complications: No anesthetic complications    Cardiovascular status: acceptable  Respiratory status: acceptable  Hydration status: acceptable    Comments: --------------------            04/20/24               1021     --------------------   BP:       128/50     Pulse:      77       Resp:       18       Temp:                SpO2:      100%     --------------------

## 2024-04-20 NOTE — PROGRESS NOTES
Colonoscopy performed 4/20/2024  Cecal AVM cauterized  Ultiva polyps removed by cold snare cold biopsies and saline lift hot snare with hemoclipping    Recommendation  Hold anticoagulation for 3 to 5 days  Trend hemoglobin  Follow-up with GI office Dr. Devine after discharge for determination of propria surveillance based on histology

## 2024-04-20 NOTE — ANESTHESIA POSTPROCEDURE EVALUATION
Patient: Paz Browne    Procedure Summary       Date: 04/20/24 Room / Location: [facility] ENDOSCOPY 1 / [facility] ENDOSCOPY    Anesthesia Start: 0940 Anesthesia Stop:     Procedure: COLONOSCOPY to cecum with cold and hot snare polypectomies with saline lift and APC Diagnosis:       Anemia, unspecified type      (Anemia, unspecified type [D64.9])    Surgeons: David Payne MD Provider: Oneil Hunter MD    Anesthesia Type: MAC ASA Status: 4            Anesthesia Type: MAC    Vitals  Vitals Value Taken Time   /50 04/20/24 1021   Temp     Pulse 101 04/20/24 1025   Resp 18 04/20/24 1021   SpO2 97 % 04/20/24 1025   Vitals shown include unfiled device data.        Post Anesthesia Care and Evaluation    Patient location during evaluation: PACU  Patient participation: complete - patient participated  Level of consciousness: awake and alert  Pain management: adequate    Airway patency: patent  Anesthetic complications: No anesthetic complications    Cardiovascular status: acceptable  Respiratory status: acceptable  Hydration status: acceptable    Comments: --------------------            04/20/24               1021     --------------------   BP:       128/50     Pulse:      77       Resp:       18       Temp:                SpO2:      100%     --------------------

## 2024-04-20 NOTE — PROGRESS NOTES
Name: Paz Browne ADMIT: 2024   : 1955  PCP: Javan Martinez MD    MRN: [id number] LOS: 9 days   AGE/SEX: 68 y.o. female  ROOM: ENDO/ENDO     Subjective   Subjective   Patient seen this morning, no complaints overnight.  No changes, no new complaints.  Was being taken to the endoscopy unit for planned colonoscopy.    Review of Systems   As above  Objective   Objective   Vital Signs  Temp:  [97.7 °F (36.5 °C)-98.2 °F (36.8 °C)] 98.2 °F (36.8 °C)  Heart Rate:  [] 78  Resp:  [16-20] 16  BP: (122-158)/(50-89) 156/67  SpO2:  [94 %-100 %] 94 %  on  Flow (L/min):  [2-6] 3;   Device (Oxygen Therapy): nasal cannula  Body mass index is 21.96 kg/m².  Physical Exam    General: Alert, laying in bed, not in distress,  HEENT: Normocephalic, atraumatic  CV: Irregular rhythm, normal rate  Lungs: Decreased bilaterally, nonlabored breathing, no wheezing,  Abdomen: Soft, nontender, nondistended  Extremities: No significant peripheral edema , no cyanosis     Results Review     I reviewed the patient's new clinical results.  Results from last 7 days   Lab Units 2437 24  0717   WBC 10*3/mm3 16.28* 15.12* 14.98* 13.43*   HEMOGLOBIN g/dL 9.1* 9.4* 9.3* 8.5*   PLATELETS 10*3/mm3 269 268 254 234     Results from last 7 days   Lab Units 24  04524  0422 24  1855 24  0737 24  0717   SODIUM mmol/L 145 144  --  145 142   POTASSIUM mmol/L 3.0* 3.7 3.7 3.2* 3.7   CHLORIDE mmol/L 102 99  --  97* 92*   CO2 mmol/L 32.6* 31.3*  --  37.6* 41.1*   BUN mg/dL 19 25*  --  19 18   CREATININE mg/dL 0.72 0.95  --  0.95 0.82   GLUCOSE mg/dL 94 118*  --  114* 137*   Estimated Creatinine Clearance: 73 mL/min (by C-G formula based on SCr of 0.72 mg/dL).  Results from last 7 days   Lab Units 24  0648   ALBUMIN g/dL 3.3*   BILIRUBIN mg/dL 0.4   ALK PHOS U/L 55   AST (SGOT) U/L 16   ALT (SGPT) U/L 11     Results from last 7 days   Lab Units 24  0450  "04/19/24  0422 04/18/24  0737 04/17/24  0717 04/16/24  0648   CALCIUM mg/dL 8.5* 9.0 9.0 8.9 8.6   ALBUMIN g/dL  --   --   --   --  3.3*   MAGNESIUM mg/dL 2.2 2.4 2.4 2.1  --    PHOSPHORUS mg/dL 2.5 3.5 4.3 3.2 2.9     Results from last 7 days   Lab Units 04/14/24  0545   PROCALCITONIN ng/mL 0.05     COVID19   Date Value Ref Range Status   04/11/2024 Not Detected Not Detected - Ref. Range Final   03/31/2024 Not Detected Not Detected - Ref. Range Final     No results found for: \"HGBA1C\", \"POCGLU\"        XR Chest 1 View  Narrative: XR CHEST 1 VW-4/14/2024     HISTORY: Shortness of breath.     Heart size is at the upper limits of normal. There is bilateral  interstitial and alveolar fluid consistent with pulmonary edema. There  could be an element of pneumonia in the lung bases. Bilateral pleural  effusions are seen. Sternotomy wires are seen. Left upper extremity PICC  line is seen in good position.     No pneumothorax is seen.     Impression: 1. FINDINGS consistent with bilateral pulmonary edema and pleural  effusions. These findings may have progressed slightly since the  4/11/2024 study.        This report was finalized on 4/14/2024 2:22 PM by Dr. Donis Guzmán M.D on Workstation: [id number]       Scheduled Medications  ampicillin, 2 g, Intravenous, Q4H  [Held by provider] apixaban, 5 mg, Oral, Q12H  atorvastatin, 40 mg, Oral, Daily  budesonide, 0.5 mg, Nebulization, BID - RT  cefTRIAXone (ROCEPHIN) 2,000 mg in sodium chloride 0.9 % 100 mL MBP, 2,000 mg, Intravenous, Q12H  cetirizine, 10 mg, Oral, Daily  cyclobenzaprine, 10 mg, Oral, Nightly  dilTIAZem, 60 mg, Oral, Q6H  multivitamin with minerals, 1 tablet, Oral, Daily  nystatin, 5 mL, Oral, 4x Daily  pantoprazole, 40 mg, Oral, Q AM  potassium chloride ER, 40 mEq, Oral, Q4H  predniSONE, 40 mg, Oral, Daily With Breakfast  roflumilast, 500 mcg, Oral, Daily  rOPINIRole, 2 mg, Oral, Nightly  sertraline, 100 mg, Oral, Daily  sodium chloride, 10 mL, Intravenous, " Q12H  sodium chloride, 10 mL, Intravenous, Q12H  sodium chloride, 10 mL, Intravenous, Q12H  sodium chloride, 10 mL, Intravenous, Q12H  spironolactone, 25 mg, Oral, Daily    Infusions  sodium chloride, 30 mL/hr, Last Rate: 30 mL/hr (04/20/24 0848)    Diet  Diet: Liquid; Clear Liquid; Fluid Consistency: Thin (IDDSI 0)    I have personally reviewed     [x]  Laboratory   []  Microbiology   []  Radiology   []  EKG/Telemetry  []  Cardiology/Vascular   []  Pathology    []  Records       Assessment/Plan     Active Hospital Problems    Diagnosis  POA    **Acute on chronic diastolic congestive heart failure [I50.33]  Yes    COPD (chronic obstructive pulmonary disease) [J44.9]  Unknown    Septicemia due to enterococcus [A41.81]  Yes    Pneumonia [J18.9]  Yes    Iron deficiency anemia [D50.9]  Yes    Chronic hypoxemic respiratory failure [J96.11]  Yes    Anemia [D64.9]  Unknown    PAF (paroxysmal atrial fibrillation) [I48.0]  Yes      Resolved Hospital Problems   No resolved problems to display.       68 y.o. female with COPD, chronic hypoxic respiratory failure on 4 l nasal cannula, chronic diastolic CHF and A-fib with recent hospitalization for enterococcal bacteremia discharged with IV antibiotics now admitted with worsening dyspnea      Active chronic heart failure with preserved ejection fraction  History of MV replacement  -Echocardiogram 04/4/24 showed EF of 61 to 65%  -Status post aggressive diuresis with IV Bumex, received IV acetazolamide 04/19  -Plan to initiate on IV      Hypokalemia  3.0 this morning, on replacement per protocol      Severe COPD/chronic hypoxic respiratory failure on 3- 4 L nasal cannula at baseline  -budesonide nebulizer  -Roflumilast  -Pulmonology evaluated and signed off  -Oxygen requirements improved, back to baseline  -Status post IV steroids.  Tapering down, transition prednisone 20 mg for 3 days.      A-fib with RVR  -On diltiazem, apixaban on hold due to melena    Enterococcus  septicemia  -Diagnosed during recent hospitalization from 4/2/2024 - 4/5/2024.    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  At discharge ampicillin dosing to be  changed to 12 g every 24 hours via continuous infusion.  -WBC trending up, but likely due to steroids.      Melena/acute on chronic anemia  -Hemoglobin dropped to 6.9 on 04/12/2024, status post 1 unit of PRBC transfused  -Eliquis on hold  -Continue IV PPI  -Globin stable, 9.1 today.  -Underwent EGD on 10/19-LA Grade A esophagitis with no bleeding. Biopsied.  -Colonoscopy pending today  -Plan to resume Eliquis once cleared by and if no further signs of bleeding.        SCDs for DVT prophylaxis.  Full code.  Discussed with   Anticipate discharge home, timing yet to be determined likely 1 to 2 days      Copied text in this note has been reviewed and is accurate as of 04/20/24.         Dictated utilizing Dragon dictation        Arina Castaneda MD  [facility]  04/20/24  11:19 EDT

## 2024-04-20 NOTE — DISCHARGE INSTRUCTIONS
Will need weekly CBC with differential and BMP faxed to me at [phone].  Per infectious disease recommendations

## 2024-04-20 NOTE — PROGRESS NOTES
"   LOS: 9 days   Patient Care Team:  Javan Martinez MD as PCP - General (Internal Medicine)  Ag Melo Jr., MD as Consulting Physician (Pulmonary Disease)  Cleveland Devine MD as Consulting Physician (Gastroenterology)  Earnest Gan MD as Consulting Physician (Cardiology)  Daniela Shin MD PhD as Consulting Physician (Hematology and Oncology)    Chief Complaint: SOA     Interval History: Tolerated Cscope w/out incident. Had a colonic AVM treated and several polyps removed. Continues to feel stronger every day.       Objective   Vital Signs  Temp:  [97.9 °F (36.6 °C)-98.2 °F (36.8 °C)] 97.9 °F (36.6 °C)  Heart Rate:  [] 87  Resp:  [16-20] 18  BP: (128-158)/(50-89) 142/64  No intake or output data in the 24 hours ending 04/20/24 1807      Last Weight and Admission Weight        04/20/24  0500   Weight: 63.6 kg (140 lb 3.4 oz)     Flowsheet Rows      Flowsheet Row First Filed Value   Admission Height 170.2 cm (67\") Documented at 04/11/2024 1752   Admission Weight 59 kg (130 lb) Documented at 04/11/2024 1752            Physical Exam  HENT:      Head: Normocephalic.      Mouth/Throat:      Pharynx: Oropharynx is clear.   Eyes:      Conjunctiva/sclera: Conjunctivae normal.   Cardiovascular:      Rate and Rhythm: Normal rate. Rhythm irregular.   Pulmonary:      Breath sounds: Decreased air movement present.   Abdominal:      Palpations: Abdomen is soft.      Tenderness: There is no abdominal tenderness.   Musculoskeletal:         General: No swelling.   Neurological:      General: No focal deficit present.       Results Review:      Results from last 7 days   Lab Units 04/20/24  0457 04/19/24  0422 04/18/24  1855 04/18/24  0737   SODIUM mmol/L 145 144  --  145   POTASSIUM mmol/L 3.0* 3.7 3.7 3.2*   CHLORIDE mmol/L 102 99  --  97*   CO2 mmol/L 32.6* 31.3*  --  37.6*   BUN mg/dL 19 25*  --  19   CREATININE mg/dL 0.72 0.95  --  0.95   GLUCOSE mg/dL 94 118*  --  114*   CALCIUM mg/dL 8.5* 9.0  --  9.0 "         Results from last 7 days   Lab Units 04/20/24  0457 04/19/24  0422 04/18/24  0737   WBC 10*3/mm3 16.28* 15.12* 14.98*   HEMOGLOBIN g/dL 9.1* 9.4* 9.3*   HEMATOCRIT % 29.2* 30.6* 30.2*   PLATELETS 10*3/mm3 269 268 254             Results from last 7 days   Lab Units 04/20/24  0457   MAGNESIUM mg/dL 2.2           I reviewed the patient's new clinical results.  I personally viewed and interpreted the patient's EKG/Telemetry data        Medication Review:   ampicillin, 2 g, Intravenous, Q4H  [Held by provider] apixaban, 5 mg, Oral, Q12H  atorvastatin, 40 mg, Oral, Daily  budesonide, 0.5 mg, Nebulization, BID - RT  cefTRIAXone (ROCEPHIN) 2,000 mg in sodium chloride 0.9 % 100 mL MBP, 2,000 mg, Intravenous, Q12H  cetirizine, 10 mg, Oral, Daily  cyclobenzaprine, 10 mg, Oral, Nightly  dilTIAZem, 60 mg, Oral, Q6H  multivitamin with minerals, 1 tablet, Oral, Daily  nystatin, 5 mL, Oral, 4x Daily  pantoprazole, 40 mg, Oral, Q AM  potassium chloride, 40 mEq, Oral, Q4H  predniSONE, 20 mg, Oral, Daily With Breakfast  roflumilast, 500 mcg, Oral, Daily  rOPINIRole, 2 mg, Oral, Nightly  sertraline, 100 mg, Oral, Daily  sodium chloride, 10 mL, Intravenous, Q12H  sodium chloride, 10 mL, Intravenous, Q12H  sodium chloride, 10 mL, Intravenous, Q12H  sodium chloride, 10 mL, Intravenous, Q12H  spironolactone, 25 mg, Oral, Daily    sodium chloride, 30 mL/hr, Last Rate: 30 mL/hr (04/20/24 0848)        Assessment & Plan     1. Acute on chronic HFpEF  2. PAF  3. Chronic hypoxemic respiratory failure/COPD  4. Enterococcus bacteremia  5. History of MV replacement  6. Anemia requiring transfusion    *O2 requirement is at to baseline, she remains SOA but definitely feels better with diuresis, and clinically she is improved.     *Continue to encourage fluid restriction     *She has had several days of fairly intense diuresis (bumetanide 2mg VI q8h and 3 days of IV acetazolamide). She is now s/p C-scope. I will start oral bumetanide and  continue spironolactone; K+ is being replaced today.     *HR is reasonably controlled on diltiazem, avoiding BB due to lung disease, apixaban held due to GIB concerns. D/W Dr Payne, hopefully can resume in 2-3 days.     Rodlofo Null MD  04/20/24  18:07 EDT

## 2024-04-20 NOTE — ANESTHESIA PREPROCEDURE EVALUATION
Anesthesia Evaluation     Patient summary reviewed and Nursing notes reviewed                Airway   Mallampati: II  TM distance: >3 FB  Neck ROM: full  Dental    (+) edentulous    Pulmonary    (+) COPD, asthma,  Cardiovascular     ECG reviewed  Rhythm: irregular  Rate: normal    (+) hypertension, valvular problems/murmurs MR, dysrhythmias Paroxysmal Atrial Fib, CHF , PVD, hyperlipidemia      Neuro/Psych  (+) headaches  GI/Hepatic/Renal/Endo    (+) hiatal hernia, GERD, PUD, GI bleeding , hepatitis, liver disease fatty liver disease, renal disease- CRI    Musculoskeletal     Abdominal    Substance History - negative use     OB/GYN negative ob/gyn ROS         Other   arthritis,                 Anesthesia Plan    ASA 4     MAC     intravenous induction     Anesthetic plan, risks, benefits, and alternatives have been provided, discussed and informed consent has been obtained with: patient.    CODE STATUS:    Code Status (Patient has no pulse and is not breathing): CPR (Attempt to Resuscitate)  Medical Interventions (Patient has pulse or is breathing): Full

## 2024-04-21 ENCOUNTER — READMISSION MANAGEMENT (OUTPATIENT)
Dept: CALL CENTER | Facility: HOSPITAL | Age: 69
End: 2024-04-21
Payer: MEDICARE

## 2024-04-21 VITALS
WEIGHT: 135 LBS | RESPIRATION RATE: 18 BRPM | DIASTOLIC BLOOD PRESSURE: 61 MMHG | OXYGEN SATURATION: 97 % | HEART RATE: 82 BPM | BODY MASS INDEX: 21.19 KG/M2 | HEIGHT: 67 IN | TEMPERATURE: 97.6 F | SYSTOLIC BLOOD PRESSURE: 174 MMHG

## 2024-04-21 PROBLEM — I50.31 ACUTE HEART FAILURE WITH PRESERVED EJECTION FRACTION (HFPEF): Status: ACTIVE | Noted: 2024-04-21

## 2024-04-21 LAB
ANION GAP SERPL CALCULATED.3IONS-SCNC: 9 MMOL/L (ref 5–15)
BUN SERPL-MCNC: 19 MG/DL (ref 8–23)
BUN/CREAT SERPL: 22.9 (ref 7–25)
CALCIUM SPEC-SCNC: 8.5 MG/DL (ref 8.6–10.5)
CHLORIDE SERPL-SCNC: 101 MMOL/L (ref 98–107)
CO2 SERPL-SCNC: 31 MMOL/L (ref 22–29)
CREAT SERPL-MCNC: 0.83 MG/DL (ref 0.57–1)
DEPRECATED RDW RBC AUTO: 52.1 FL (ref 37–54)
EGFRCR SERPLBLD CKD-EPI 2021: 75.9 ML/MIN/1.73
ERYTHROCYTE [DISTWIDTH] IN BLOOD BY AUTOMATED COUNT: 16.2 % (ref 12.3–15.4)
GLUCOSE SERPL-MCNC: 93 MG/DL (ref 65–99)
HCT VFR BLD AUTO: 28.1 % (ref 34–46.6)
HGB BLD-MCNC: 8.7 G/DL (ref 12–15.9)
MAGNESIUM SERPL-MCNC: 2.2 MG/DL (ref 1.6–2.4)
MCH RBC QN AUTO: 27.4 PG (ref 26.6–33)
MCHC RBC AUTO-ENTMCNC: 31 G/DL (ref 31.5–35.7)
MCV RBC AUTO: 88.4 FL (ref 79–97)
PHOSPHATE SERPL-MCNC: 2.7 MG/DL (ref 2.5–4.5)
PLATELET # BLD AUTO: 239 10*3/MM3 (ref 140–450)
PMV BLD AUTO: 10.3 FL (ref 6–12)
POTASSIUM SERPL-SCNC: 3.7 MMOL/L (ref 3.5–5.2)
RBC # BLD AUTO: 3.18 10*6/MM3 (ref 3.77–5.28)
SODIUM SERPL-SCNC: 141 MMOL/L (ref 136–145)
WBC NRBC COR # BLD AUTO: 12.83 10*3/MM3 (ref 3.4–10.8)

## 2024-04-21 PROCEDURE — 99232 SBSQ HOSP IP/OBS MODERATE 35: CPT | Performed by: INTERNAL MEDICINE

## 2024-04-21 PROCEDURE — 94761 N-INVAS EAR/PLS OXIMETRY MLT: CPT

## 2024-04-21 PROCEDURE — 25010000002 AMPICILLIN PER 500 MG: Performed by: INTERNAL MEDICINE

## 2024-04-21 PROCEDURE — 94664 DEMO&/EVAL PT USE INHALER: CPT

## 2024-04-21 PROCEDURE — 83735 ASSAY OF MAGNESIUM: CPT | Performed by: INTERNAL MEDICINE

## 2024-04-21 PROCEDURE — 25010000002 CEFTRIAXONE PER 250 MG: Performed by: INTERNAL MEDICINE

## 2024-04-21 PROCEDURE — 80048 BASIC METABOLIC PNL TOTAL CA: CPT | Performed by: INTERNAL MEDICINE

## 2024-04-21 PROCEDURE — 94799 UNLISTED PULMONARY SVC/PX: CPT

## 2024-04-21 PROCEDURE — 94760 N-INVAS EAR/PLS OXIMETRY 1: CPT

## 2024-04-21 PROCEDURE — 84100 ASSAY OF PHOSPHORUS: CPT | Performed by: INTERNAL MEDICINE

## 2024-04-21 PROCEDURE — 63710000001 PREDNISONE PER 1 MG: Performed by: STUDENT IN AN ORGANIZED HEALTH CARE EDUCATION/TRAINING PROGRAM

## 2024-04-21 PROCEDURE — 85027 COMPLETE CBC AUTOMATED: CPT | Performed by: INTERNAL MEDICINE

## 2024-04-21 RX ORDER — PREDNISONE 10 MG/1
10 TABLET ORAL
Qty: 3 TABLET | Refills: 0 | Status: SHIPPED | OUTPATIENT
Start: 2024-04-23 | End: 2024-04-26

## 2024-04-21 RX ORDER — POTASSIUM CHLORIDE 750 MG/1
10 TABLET, FILM COATED, EXTENDED RELEASE ORAL DAILY
Status: DISCONTINUED | OUTPATIENT
Start: 2024-04-21 | End: 2024-04-21 | Stop reason: HOSPADM

## 2024-04-21 RX ORDER — UMECLIDINIUM BROMIDE AND VILANTEROL TRIFENATATE 62.5; 25 UG/1; UG/1
1 POWDER RESPIRATORY (INHALATION)
Qty: 60 EACH | Refills: 1 | Status: SHIPPED | OUTPATIENT
Start: 2024-04-21

## 2024-04-21 RX ORDER — BUMETANIDE 2 MG/1
2 TABLET ORAL 2 TIMES DAILY
Qty: 60 TABLET | Refills: 0 | Status: SHIPPED | OUTPATIENT
Start: 2024-04-21 | End: 2024-05-21

## 2024-04-21 RX ORDER — PANTOPRAZOLE SODIUM 40 MG/1
40 TABLET, DELAYED RELEASE ORAL 2 TIMES DAILY
Qty: 60 TABLET | Refills: 0 | Status: SHIPPED | OUTPATIENT
Start: 2024-04-21 | End: 2024-05-21

## 2024-04-21 RX ORDER — SPIRONOLACTONE 25 MG/1
25 TABLET ORAL DAILY
Qty: 30 TABLET | Refills: 0 | Status: SHIPPED | OUTPATIENT
Start: 2024-04-22 | End: 2024-05-22

## 2024-04-21 RX ORDER — DILTIAZEM HYDROCHLORIDE 60 MG/1
60 TABLET, FILM COATED ORAL EVERY 6 HOURS SCHEDULED
Qty: 120 TABLET | Refills: 0 | Status: SHIPPED | OUTPATIENT
Start: 2024-04-21 | End: 2024-05-21

## 2024-04-21 RX ORDER — POTASSIUM CHLORIDE 750 MG/1
10 TABLET, FILM COATED, EXTENDED RELEASE ORAL DAILY
Qty: 30 TABLET | Refills: 0 | Status: SHIPPED | OUTPATIENT
Start: 2024-04-21 | End: 2024-04-26

## 2024-04-21 RX ADMIN — BUMETANIDE 2 MG: 2 TABLET ORAL at 08:33

## 2024-04-21 RX ADMIN — DILTIAZEM HYDROCHLORIDE 60 MG: 60 TABLET, FILM COATED ORAL at 05:45

## 2024-04-21 RX ADMIN — CEFTRIAXONE 2000 MG: 2 INJECTION, POWDER, FOR SOLUTION INTRAMUSCULAR; INTRAVENOUS at 08:34

## 2024-04-21 RX ADMIN — CETIRIZINE HYDROCHLORIDE 10 MG: 10 TABLET ORAL at 08:33

## 2024-04-21 RX ADMIN — Medication 1 TABLET: at 08:34

## 2024-04-21 RX ADMIN — BUDESONIDE 0.5 MG: 0.5 INHALANT RESPIRATORY (INHALATION) at 07:33

## 2024-04-21 RX ADMIN — AMPICILLIN SODIUM 2 G: 2 INJECTION, POWDER, FOR SOLUTION INTRAVENOUS at 11:05

## 2024-04-21 RX ADMIN — HYDROCODONE BITARTRATE AND ACETAMINOPHEN 1 TABLET: 7.5; 325 TABLET ORAL at 14:19

## 2024-04-21 RX ADMIN — AMPICILLIN SODIUM 2 G: 2 INJECTION, POWDER, FOR SOLUTION INTRAVENOUS at 05:44

## 2024-04-21 RX ADMIN — AMPICILLIN SODIUM 2 G: 2 INJECTION, POWDER, FOR SOLUTION INTRAVENOUS at 14:14

## 2024-04-21 RX ADMIN — ROFLUMILAST 500 MCG: 500 TABLET ORAL at 08:33

## 2024-04-21 RX ADMIN — PREDNISONE 20 MG: 20 TABLET ORAL at 08:33

## 2024-04-21 RX ADMIN — POTASSIUM CHLORIDE 10 MEQ: 750 TABLET, EXTENDED RELEASE ORAL at 14:14

## 2024-04-21 RX ADMIN — ATORVASTATIN CALCIUM 40 MG: 20 TABLET, FILM COATED ORAL at 08:33

## 2024-04-21 RX ADMIN — SPIRONOLACTONE 25 MG: 25 TABLET, FILM COATED ORAL at 08:33

## 2024-04-21 RX ADMIN — DILTIAZEM HYDROCHLORIDE 60 MG: 60 TABLET, FILM COATED ORAL at 12:51

## 2024-04-21 RX ADMIN — Medication 10 ML: at 08:34

## 2024-04-21 RX ADMIN — PANTOPRAZOLE SODIUM 40 MG: 40 TABLET, DELAYED RELEASE ORAL at 06:27

## 2024-04-21 RX ADMIN — SERTRALINE 100 MG: 100 TABLET, FILM COATED ORAL at 08:33

## 2024-04-21 RX ADMIN — NYSTATIN 500000 UNITS: 100000 SUSPENSION ORAL at 08:34

## 2024-04-21 NOTE — PROGRESS NOTES
"   LOS: 10 days   Patient Care Team:  Javan Martinez MD as PCP - General (Internal Medicine)  Ag Melo Jr., MD as Consulting Physician (Pulmonary Disease)  Cleveland Devine MD as Consulting Physician (Gastroenterology)  Earnest Gan MD as Consulting Physician (Cardiology)  Daniela Shin MD PhD as Consulting Physician (Hematology and Oncology)    Chief Complaint: SOA     Interval History: No issues overnight. Great spirits. Says she's going home today! Breathing is at baseline.     Objective   Vital Signs  Temp:  [97.5 °F (36.4 °C)-98.6 °F (37 °C)] 97.5 °F (36.4 °C)  Heart Rate:  [79-90] 82  Resp:  [14-18] 16  BP: (132-149)/(49-75) 133/54    Intake/Output Summary (Last 24 hours) at 4/21/2024 1101  Last data filed at 4/20/2024 2320  Gross per 24 hour   Intake 240 ml   Output 700 ml   Net -460 ml         Last Weight and Admission Weight        04/21/24  0700   Weight: 61.2 kg (135 lb)     Flowsheet Rows      Flowsheet Row First Filed Value   Admission Height 170.2 cm (67\") Documented at 04/11/2024 1752   Admission Weight 59 kg (130 lb) Documented at 04/11/2024 1752            Physical Exam  HENT:      Head: Normocephalic.      Mouth/Throat:      Pharynx: Oropharynx is clear.   Eyes:      Conjunctiva/sclera: Conjunctivae normal.   Cardiovascular:      Rate and Rhythm: Normal rate. Rhythm irregular.      Heart sounds: Murmur heard.      Systolic murmur is present with a grade of 1/6.   Pulmonary:      Breath sounds: Decreased air movement present.   Abdominal:      Palpations: Abdomen is soft.      Tenderness: There is no abdominal tenderness.   Musculoskeletal:         General: No swelling.   Neurological:      General: No focal deficit present.       Results Review:      Results from last 7 days   Lab Units 04/21/24  0628 04/20/24  0457 04/19/24  0422   SODIUM mmol/L 141 145 144   POTASSIUM mmol/L 3.7 3.0* 3.7   CHLORIDE mmol/L 101 102 99   CO2 mmol/L 31.0* 32.6* 31.3*   BUN mg/dL 19 19 25* "   CREATININE mg/dL 0.83 0.72 0.95   GLUCOSE mg/dL 93 94 118*   CALCIUM mg/dL 8.5* 8.5* 9.0         Results from last 7 days   Lab Units 04/21/24  0628 04/20/24  0457 04/19/24  0422   WBC 10*3/mm3 12.83* 16.28* 15.12*   HEMOGLOBIN g/dL 8.7* 9.1* 9.4*   HEMATOCRIT % 28.1* 29.2* 30.6*   PLATELETS 10*3/mm3 239 269 268             Results from last 7 days   Lab Units 04/21/24  0628   MAGNESIUM mg/dL 2.2           I reviewed the patient's new clinical results.  I personally viewed and interpreted the patient's EKG/Telemetry data        Medication Review:   ampicillin, 2 g, Intravenous, Q4H  [Held by provider] apixaban, 5 mg, Oral, Q12H  atorvastatin, 40 mg, Oral, Daily  budesonide, 0.5 mg, Nebulization, BID - RT  bumetanide, 2 mg, Oral, BID  cefTRIAXone (ROCEPHIN) 2,000 mg in sodium chloride 0.9 % 100 mL MBP, 2,000 mg, Intravenous, Q12H  cetirizine, 10 mg, Oral, Daily  cyclobenzaprine, 10 mg, Oral, Nightly  dilTIAZem, 60 mg, Oral, Q6H  multivitamin with minerals, 1 tablet, Oral, Daily  nystatin, 5 mL, Oral, 4x Daily  pantoprazole, 40 mg, Oral, Q AM  predniSONE, 20 mg, Oral, Daily With Breakfast  roflumilast, 500 mcg, Oral, Daily  rOPINIRole, 2 mg, Oral, Nightly  sertraline, 100 mg, Oral, Daily  sodium chloride, 10 mL, Intravenous, Q12H  sodium chloride, 10 mL, Intravenous, Q12H  sodium chloride, 10 mL, Intravenous, Q12H  sodium chloride, 10 mL, Intravenous, Q12H  spironolactone, 25 mg, Oral, Daily    sodium chloride, 30 mL/hr, Last Rate: 30 mL/hr (04/20/24 0848)        Assessment & Plan     1. Acute on chronic HFpEF  2. PAF  3. Chronic hypoxemic respiratory failure/COPD  4. Recent Enterococcus bacteremia, on IV abx, without acute endocarditis by recent CHIO  5. History of MV replacement/previous history of MV endocarditis  6. Mild AS  7. Anemia requiring transfusion    *O2 requirement is at to baseline, her dyspnea is greatly improved    *Continue to encourage fluid restriction, especially soda    *She was on furosemide at  home, recommend bumetanide 2mg BID at home, along with spironolactone 25mg daily and KCL 10meq daily    *HR is reasonably controlled on diltiazem, avoiding BB due to lung disease, apixaban held due to GIB concerns. D/W Dr Payne, as long as no obviously bleeding, can resume Tuesday AM.     *D/W Dr Castaneda, I'm okay with discharge today, we will arrange outpatient follow up in office    Rodolfo Null MD  04/21/24  11:01 EDT

## 2024-04-21 NOTE — DISCHARGE PLACEMENT REQUEST
"Paz Moreau (68 y.o. Female)       Date of Birth   1955    Social Security Number       Address   [address] [address]    Home Phone   [phone]    MRN   [id number]       [facility]    Marital Status                               Admission Date   4/11/24    Admission Type   Emergency    Admitting Provider   Susanne Bello MD    Attending Provider   Arina Castaneda MD    Department, Room/Bed   [address], [address]/1       Discharge Date       Discharge Disposition   Home or Self Care    Discharge Destination                                 Attending Provider: Arina Castaneda MD    Allergies: Bupropion, Cephalexin, Metoprolol    Isolation: None   Infection: None   Code Status: CPR    Ht: 170.2 cm (67\")   Wt: 61.2 kg (135 lb)    Admission Cmt: None   Principal Problem: Acute on chronic diastolic congestive heart failure [I50.33]                   Active Insurance as of 4/11/2024       Primary Coverage       Payor Plan Insurance Group Employer/Plan Group    [facility] MEDICARE REPLACEMENT [facility] MEDICARE ADVANTAGE [id number]       Payor Plan Address Payor Plan Phone Number Payor Plan Fax Number Effective Dates    [address] [phone]  1/1/2018 - None Entered    [facility] [address]         Subscriber Name Subscriber Birth Date Member ID       PAZ MOREAU 1955 [id number]                     Emergency Contacts        (Rel.) Home Phone Work Phone Mobile Phone    PavanChapincito (Spouse) [phone] -- [phone]    PavanCinda anthony (Daughter) -- -- [phone]    GuanakitoChalinoMalissa (Daughter) [phone] -- [phone]                "

## 2024-04-21 NOTE — DISCHARGE SUMMARY
Patient Name: Paz Browne  : 1955  MRN: [id number]    Date of Admission: 2024  Date of Discharge:  2024  Primary Care Physician: Javan Martinez MD      Chief Complaint:   Cough      Discharge Diagnoses     Active Hospital Problems    Diagnosis  POA    **Acute on chronic diastolic congestive heart failure [I50.33]  Yes    Acute heart failure with preserved ejection fraction (HFpEF) [I50.31]  Yes    Esophagitis [K20.90]  Unknown    COPD (chronic obstructive pulmonary disease) [J44.9]  Unknown    Septicemia due to enterococcus [A41.81]  Yes    Pneumonia [J18.9]  Yes    Iron deficiency anemia [D50.9]  Yes    Chronic hypoxemic respiratory failure [J96.11]  Yes    Anemia [D64.9]  Unknown    Lower GI bleed [K92.2]  Yes    PAF (paroxysmal atrial fibrillation) [I48.0]  Yes      Resolved Hospital Problems   No resolved problems to display.        Hospital Course     1 y.o. female with COPD, chronic hypoxic respiratory failure on 4 l nasal cannula, chronic diastolic CHF and A-fib with recent hospitalization for enterococcal bacteremia discharged with IV antibiotics now admitted with worsening dyspnea        Active chronic heart failure with preserved ejection fraction  History of MV replacement  Echocardiogram 24 showed EF of 61 to 65%.  Cardiology was consulted, patient was treated aggressively with IV Bumex with good response, was euvolemic prior to discharge.  Home Lasix was discontinued, patient was discharged on bumetanide 2mg BID at home,  spironolactone 25mg daily and KCL 10meq daily per cardiology recommendations.  Cardiology to arrange outpatient follow-up in office.              Severe COPD/chronic hypoxic respiratory failure on 3- 4 L nasal cannula at baseline  Pulmonology evaluated.  Was treated with IV steroids, nebulizers.  Oxygen requirements were at baseline prior to discharge.  Continued home breathing treatments at discharge, Anoro Ellipta inhaler was added.  Discharged on  steroid taper, 10 mg prednisone for 3 more days.  Follow-up with pulmonology outpatient per their recommendations.          A-fib with RVR-home beta-blocker was discontinued due to severe COPD.  Initiated on diltiazem and heart rate remained stable.  Apixaban was held due to GI bleed.  Eliquis to be resumed on 04/23/2024 per GI recommendations.  Continued diltiazem 60 mg every 6 hours at discharge.     Enterococcus septicemia  -Diagnosed during recent hospitalization from 4/2/2024 - 4/5/2024.  -Patient patient underwent TTE and CHIO during last admission which showed showed a stable, known, small, echodense, mobile mass on the posterior leaflet of the prosthetic mitral valveID evaluated, plan to continue previous plan of antibiotics with  ampicillin 2 g IV every 4 hours and ceftriaxone 2 g IV every 12 hours for 6 weeks course with stop date 5/13/2024.  At discharge ampicillin dosing was   changed to 12 g every 24 hours via continuous infusion per ID recommendations.  Patient will need weekly CBC with differential and BMP faxed to me at [phone].        Lower GI bleed/acute on chronic anemia-Hemoglobin dropped to 6.9 on 04/12/2024, status post 1 unit of PRBC transfused.  Patient was initiated on IV PPI, Eliquis was held.  GI was consulted, patient underwent  EGD on 04/19 which showed LA Grade A esophagitis with no signs of bleeding.  Followed by colonoscopy on 04/20/2024 and was found to have cecal AVM which was cauterized, Ultiva polyps removed by cold snare cold biopsies and saline lift hot snare with hemoclipping.  Hemoglobin fluctuated but remained stable around 9.  Eliquis was cleared to be resumed on 04/23/2024 per GI recommendations.  Patient to have weekly CBC/BMP obtained while on antibiotics as above, hemoglobin hematocrit as well.    LA grade A esophagitis  Seen on EGD 04/19/2024.  Discharged on p.o. pantoprazole 40 mg twice daily.         At the time of discharge patient was told to take all  medications as prescribed, keep all follow-up appointments, and call their doctor or return to the hospital with any worsening or concerning symptoms.         Day of Discharge     Subjective:  Patient lying in bed, no acute events overnight.  No signs of bleeding, no changes, feels about the same.  Denies shortness of breath currently, fevers, chills, nausea or vomiting.  Wants to be discharged.    Physical Exam:  Temp:  [97.5 °F (36.4 °C)-98.6 °F (37 °C)] 97.5 °F (36.4 °C)  Heart Rate:  [80-90] 82  Resp:  [14-18] 16  BP: (132-149)/(49-75) 133/54  Body mass index is 21.14 kg/m².  Physical Exam    General: Alert, sitting up in the bed, not in distress, chronically ill-appearing  HEENT: Normocephalic, atraumatic  CV: Irregular rhythm, normal rate, +murmur  Lungs: Diminished, no wheezing, nonlabored breathing  Abdomen: Soft, nontender, nondistended  Extremities: No significant peripheral edema , no cyanosis     Consultants     Consult Orders (all) (From admission, onward)       Start     Ordered    04/14/24 1329  Inpatient Gastroenterology Consult  Once        Specialty:  Gastroenterology  Provider:  Rick Morales MD    04/14/24 1329 04/11/24 2251  Inpatient Cardiology Consult  Once        Specialty:  Cardiology  Provider:  Kamar Haddad MD    04/11/24 2251 04/11/24 2231  Inpatient Pulmonology Consult  Once        Specialty:  Pulmonary Disease  Provider:  Gus Prince MD    04/11/24 2230 04/11/24 1941  Inpatient Infectious Diseases Consult  Once        Specialty:  Infectious Diseases  Provider:  Franchesca Barros MD    04/11/24 1940                  Procedures     COLONOSCOPY to cecum with cold and hot snare polypectomies with saline lift and APC      Imaging Results (All)       Procedure Component Value Units Date/Time    XR Chest 1 View [[id number]] Collected: 04/14/24 1421     Updated: 04/14/24 1425    Narrative:      XR CHEST 1 VW-4/14/2024     HISTORY: Shortness of breath.     Heart size is  at the upper limits of normal. There is bilateral  interstitial and alveolar fluid consistent with pulmonary edema. There  could be an element of pneumonia in the lung bases. Bilateral pleural  effusions are seen. Sternotomy wires are seen. Left upper extremity PICC  line is seen in good position.     No pneumothorax is seen.       Impression:      1. FINDINGS consistent with bilateral pulmonary edema and pleural  effusions. These findings may have progressed slightly since the  4/11/2024 study.        This report was finalized on 4/14/2024 2:22 PM by Dr. Donis Guzmán M.D on Workstation: [id number]       CT Chest Without Contrast Diagnostic [[id number]] Collected: 04/12/24 1725     Updated: 04/12/24 1759    Narrative:      CT CHEST WITHOUT IV CONTRAST     HISTORY: Shortness of breath     TECHNIQUE: Radiation dose reduction techniques were utilized, including  automated exposure control and exposure modulation based on body size.   3 mm images were obtained through the chest without IV contrast.     COMPARISON CT chest dating back to 11/17/2023        FINDINGS:  Evaluation is suboptimal without intravenous contrast.     The distal esophagus has a thickened appearance. Mediastinal adenopathy  is present. Index precarinal adenopathy has mildly increased in size,  measuring 1.2 cm (previously 0.9 cm). Index subcarinal adenopathy  measures 1.1 cm in short axis dimension (previously 0.8 cm).. No  axillary adenopathy by size criteria. Small pericardial effusion. At  least minimal coronary artery calcification. Small bilateral pleural  effusions are present. There is asymmetric pulmonary consolidation  within the right lower lobe which was not seen on 3/2/2024. Severe  emphysema is present. Mild left basilar pulmonary opacification is  present which has a somewhat nodular appearance.     No suspicious lytic or blastic osseous lesions.       Impression:      1.  Small bilateral pleural effusions with bibasilar  pulmonary  pacification, right greater than left. Some areas have a somewhat  nodular appearance. Findings are most suggestive of multifocal  pneumonia, possibly aspiration, and correlation with patient history is  recommended to determine most appropriate etiology. Given the nodular  appearance, follow-up with chest CT in 6 to 8 weeks is recommended to  ensure resolution and exclude the possibility of malignancy. New  mediastinal adenopathy enlarged since three 224 can be followed at this  time as well.  2.  Thickening of distal esophagus suggestive of esophagitis in  appropriate context. Correlation with patient history is recommended  with follow-up endoscopy if clinically indicated.  3.  Severe emphysema.  4.  Other findings as above.           This report was finalized on 4/12/2024 5:56 PM by Dr. Papo De La Torre M.D  on Workstation: [id number]       XR Chest 1 View [[id number]] Collected: 04/11/24 1900     Updated: 04/11/24 1907    Narrative:      Portable chest radiograph     HISTORY: Cough, fevers     TECHNIQUE: Single AP portable radiograph of the chest     COMPARISON: Chest radiograph 4/5/2024       Impression:      Findings impression:  Background interstitial thickening is present throughout the bilateral  lungs with many areas demonstrate a somewhat nodular appearance which  cannot be evaluated on plain film radiographs, similar that seen on  prior radiographs, and best seen on CT chest 3/2/2024. Please refer to  this dictation for further formation and follow-up recommendations..  There is worsening pulmonary pacification within the right lung with  probable small bilateral pleural effusions. Cardiac silhouette is mildly  enlarged. Constellation findings are suggestive of worsening pulmonary  edema and/or multifocal pneumonia in the appropriate clinical context.  Correlation with patient history is recommended with follow-up chest CT  if clinically indicated. No pneumothorax is seen. There are  median  sternotomy wires. A left PICC tip terminates over the expected location  of the superior vena cava.           This report was finalized on 4/11/2024 7:03 PM by Dr. 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  · No hemodynamically significant stenosis of the superior mesenteric artery (SMA) is noted.    Results for orders placed during the hospital encounter of 03/31/24    Adult Transesophageal Echo (CHIO) W/ Cont if Necessary Per Protocol    Interpretation Summary    Left ventricular systolic function is normal. Left ventricular ejection fraction appears to be 61 - 65%.    Left ventricular diastolic function was indeterminate.    The right ventricular cavity is mildly dilated. Normal right ventricular systolic function noted.    The left atrial cavity is mildly dilated.    The left atrial appendage has been previously ligated    There is a bioprosthetic mitral valve replacement which appears to be well-seated. The bioprosthetic mitral valve leaflets are thickened.    There is a calcified nonmobile echodensity attached to the posterior leaflet of the bioprosthetic mitral valve replacement. This appears to be a calcified remnant of a prior vegetation. There are no obvious acute or mobile vegetations.    Gradients across the bioprosthetic mitral valve replacement are elevated with a peak gradient of 30 mmHg and a mean gradient of 11 mmHg (although the leaflets appear to open well). There is mild mitral regurgitation    There is an annuloplasty ring in the tricuspid position. There is moderate tricuspid regurgitation through the annuloplasty ring.    Calculated right ventricular systolic pressure from tricuspid regurgitation is 76 mmHg    There are moderate plaques in the descending thoracic aorta. There are  "mild plaques in the aortic arch    There is no evidence of pericardial effusion.    Pertinent Labs     Results from last 7 days   Lab Units 04/21/24  0628 04/20/24 0457 04/19/24  0422 04/18/24  0737   WBC 10*3/mm3 12.83* 16.28* 15.12* 14.98*   HEMOGLOBIN g/dL 8.7* 9.1* 9.4* 9.3*   PLATELETS 10*3/mm3 239 269 268 254     Results from last 7 days   Lab Units 04/21/24  0628 04/20/24 0457 04/19/24  0422 04/18/24  1855 04/18/24  0737   SODIUM mmol/L 141 145 144  --  145   POTASSIUM mmol/L 3.7 3.0* 3.7 3.7 3.2*   CHLORIDE mmol/L 101 102 99  --  97*   CO2 mmol/L 31.0* 32.6* 31.3*  --  37.6*   BUN mg/dL 19 19 25*  --  19   CREATININE mg/dL 0.83 0.72 0.95  --  0.95   GLUCOSE mg/dL 93 94 118*  --  114*   Estimated Creatinine Clearance: 60.9 mL/min (by C-G formula based on SCr of 0.83 mg/dL).  Results from last 7 days   Lab Units 04/16/24  0648   ALBUMIN g/dL 3.3*   BILIRUBIN mg/dL 0.4   ALK PHOS U/L 55   AST (SGOT) U/L 16   ALT (SGPT) U/L 11     Results from last 7 days   Lab Units 04/21/24  0628 04/20/24 0457 04/19/24 0422 04/18/24  0737 04/17/24  0717 04/16/24  0648   CALCIUM mg/dL 8.5* 8.5* 9.0 9.0   < > 8.6   ALBUMIN g/dL  --   --   --   --   --  3.3*   MAGNESIUM mg/dL 2.2 2.2 2.4 2.4   < >  --    PHOSPHORUS mg/dL 2.7 2.5 3.5 4.3   < > 2.9    < > = values in this interval not displayed.               Invalid input(s): \"LDLCALC\"          Test Results Pending at Discharge     Pending Labs       Order Current Status    Tissue Pathology Exam Collected (04/20/24 1004)    Tissue Pathology Exam In process            Discharge Details        Discharge Medications        New Medications        Instructions Start Date   ampicillin 12 g in sodium chloride 0.9 % 500 mL  Replaces: ampicillin 2 g in sodium chloride 0.9 % 100 mL IVPB   12 g, Intravenous, Continuous, Continuous infusion IV ampicillin 12 g every 24 hours      Anoro Ellipta 62.5-25 MCG/ACT aerosol powder  inhaler  Generic drug: Umeclidinium-Vilanterol   1 puff, " Inhalation, Daily - RT      bumetanide 2 MG tablet  Commonly known as: BUMEX   2 mg, Oral, 2 Times Daily      dilTIAZem 60 MG tablet  Commonly known as: CARDIZEM   60 mg, Oral, Every 6 Hours Scheduled      pantoprazole 40 MG EC tablet  Commonly known as: PROTONIX   40 mg, Oral, 2 Times Daily      potassium chloride 10 MEQ CR tablet   10 mEq, Oral, Daily      predniSONE 10 MG tablet  Commonly known as: DELTASONE   10 mg, Oral, Daily With Breakfast   Start Date: April 23, 2024     spironolactone 25 MG tablet  Commonly known as: ALDACTONE   25 mg, Oral, Daily   Start Date: April 22, 2024            Changes to Medications        Instructions Start Date   apixaban 5 MG tablet tablet  Commonly known as: ELIQUIS  What changed: These instructions start on April 23, 2024. If you are unsure what to do until then, ask your doctor or other care provider.   5 mg, Oral, Every 12 Hours Scheduled   Start Date: April 23, 2024            Continue These Medications        Instructions Start Date   albuterol sulfate  (90 Base) MCG/ACT inhaler  Commonly known as: PROVENTIL HFA;VENTOLIN HFA;PROAIR HFA   2 puffs, Inhalation, Every 4 Hours PRN      atorvastatin 40 MG tablet  Commonly known as: LIPITOR   TAKE 1 TABLET BY MOUTH EVERY DAY      budesonide 0.5 MG/2ML nebulizer solution  Commonly known as: PULMICORT   0.5 mg, Nebulization, 2 Times Daily - RT      cefTRIAXone 2,000 mg in sodium chloride 0.9 % 100 mL IVPB   2,000 mg, Intravenous, Every 12 Hours      cyclobenzaprine 10 MG tablet  Commonly known as: FLEXERIL   TAKE 1 TABLET BY MOUTH EVERYDAY AT BEDTIME      docosanol 10 % cream cream  Commonly known as: ABREVA   1 Application, Topical, 5 Times Daily      HYDROcodone-acetaminophen 7.5-325 MG per tablet  Commonly known as: NORCO   1 tablet, Oral, Every 6 Hours PRN      ipratropium-albuterol 0.5-2.5 mg/3 ml nebulizer  Commonly known as: DUO-NEB   3 mL, Nebulization, Every 4 Hours PRN      loperamide 2 MG capsule  Commonly known  as: IMODIUM   2 mg, Oral, 4 Times Daily PRN      loratadine 10 MG tablet  Commonly known as: CLARITIN   10 mg, Oral, 2 times daily      Multivital tablet  Generic drug: multivitamin with minerals   1 tablet, Oral, Daily      Nebulizer device   1 Units, Does not apply, 4 Times Daily PRN, J44.1 j20.8      O2  Commonly known as: OXYGEN   2 L/min, Inhalation, Continuous      ondansetron ODT 4 MG disintegrating tablet  Commonly known as: ZOFRAN-ODT   4 mg, Translingual, Every 8 Hours PRN      roflumilast 500 MCG tablet tablet  Commonly known as: DALIRESP   500 mcg, Oral, Daily      rOPINIRole 2 MG tablet  Commonly known as: REQUIP   TAKE 1 TABLET BY MOUTH EVERY DAY AT NIGHT      sertraline 100 MG tablet  Commonly known as: ZOLOFT   TAKE 1 TABLET BY MOUTH EVERY DAY      zolpidem 10 MG tablet  Commonly known as: AMBIEN   10 mg, Oral, Nightly PRN             Stop These Medications      amLODIPine 10 MG tablet  Commonly known as: NORVASC     ampicillin 2 g in sodium chloride 0.9 % 100 mL IVPB  Replaced by: ampicillin 12 g in sodium chloride 0.9 % 500 mL     carvedilol 6.25 MG tablet  Commonly known as: COREG     furosemide 40 MG tablet  Commonly known as: LASIX     lisinopril 40 MG tablet  Commonly known as: PRINIVIL,ZESTRIL     omeprazole 40 MG capsule  Commonly known as: priLOSEC     potassium chloride 20 MEQ CR tablet  Commonly known as: KLOR-CON M20              Allergies   Allergen Reactions    Bupropion Itching    Cephalexin Itching     Tolerated piperacillin/tazobactam, ampicillin, cefdinir, and ceftriaxone    Metoprolol Itching       Discharge Disposition:  Home or Self Care      Discharge Diet:  Diet Order   Procedures    Diet: Cardiac; Healthy Heart (2-3 Na+); Fluid Consistency: Thin (IDDSI 0)       Discharge Activity:       CODE STATUS:    Code Status and Medical Interventions:   Ordered at: 04/11/24 1940     Code Status (Patient has no pulse and is not breathing):    CPR (Attempt to Resuscitate)     Medical  Interventions (Patient has pulse or is breathing):    Full       Future Appointments   Date Time Provider Department Center   4/26/2024  1:30 PM [facility]   5/3/2024  1:30 PM [facility]   5/10/2024  1:30 PM CHAIR 7 [facility]   5/13/2024  1:30 PM Loy Clayton MD [facility] ID KAJAL KAJAL   5/20/2024  9:30 AM Melanie Sykes APRN [facility] CD LCGKR KAJAL   7/1/2024  9:30 AM Akbar Mendez Jr., MD [facility] CD LCGKR KAJAL      Follow-up Information       Ildefonso Harris MD Follow up in 1 month(s).    Specialties: Pulmonary Disease, Intensive Care  Why: within 3-5 weeks. Full PFT pre/post day of office visit  Contact information:  [address]  [place] [address]  [phone]               Rodolfo Null MD Follow up in 2 week(s).    Specialty: Cardiology  Why: We will call to arrange follow up  Contact information:  [address]  [place] [address]  [phone]               Javan Martinez MD Follow up in 1 week(s).    Specialty: Internal Medicine  Contact information:  [address]  [address]  [place] [id number] [phone]                             Time Spent on Discharge:  Greater than 30 minutes      Arina Castaneda MD  [facility]  04/21/24  12:32 EDT

## 2024-04-21 NOTE — PROGRESS NOTES
[facility]  Inpatient Progress Note    Reason for Followup:  Anemia    Subjective     Interval History:   No new issues.  Colonoscopy yesterday with no bleeding lesions    Current Facility-Administered Medications:     acetaminophen (TYLENOL) tablet 650 mg, 650 mg, Oral, Q4H PRN, Rick Morales MD, 650 mg at 04/17/24 0346    albuterol (PROVENTIL) nebulizer solution 0.083% 2.5 mg/3mL, 2.5 mg, Nebulization, Q6H PRN, Rick Morales MD, 2.5 mg at 04/20/24 1443    ampicillin 2000 mg IVPB in 100 mL NS (MBP), 2 g, Intravenous, Q4H, Loy Clayton MD, Last Rate: 200 mL/hr at 04/21/24 0544, 2 g at 04/21/24 0544    [Held by provider] apixaban (ELIQUIS) tablet 5 mg, 5 mg, Oral, Q12H, Susanne Bello MD, 5 mg at 04/11/24 2351    atorvastatin (LIPITOR) tablet 40 mg, 40 mg, Oral, Daily, Rick Morales MD, 40 mg at 04/21/24 0833    sennosides-docusate (PERICOLACE) 8.6-50 MG per tablet 2 tablet, 2 tablet, Oral, BID PRN **AND** polyethylene glycol (MIRALAX) packet 17 g, 17 g, Oral, Daily PRN **AND** bisacodyl (DULCOLAX) EC tablet 5 mg, 5 mg, Oral, Daily PRN **AND** bisacodyl (DULCOLAX) suppository 10 mg, 10 mg, Rectal, Daily PRN, Rick Morales MD    budesonide (PULMICORT) nebulizer solution 0.5 mg, 0.5 mg, Nebulization, BID - RT, Rick Morales MD, 0.5 mg at 04/21/24 0733    bumetanide (BUMEX) tablet 2 mg, 2 mg, Oral, BID, Rodolfo Null MD, 2 mg at 04/21/24 0833    Calcium Replacement - Follow Nurse / BPA Driven Protocol, , Does not apply, PRN, Rick Morales MD    cefTRIAXone (ROCEPHIN) 2,000 mg in sodium chloride 0.9 % 100 mL MBP, 2,000 mg, Intravenous, Q12H, Loy Clayton MD, Last Rate: 200 mL/hr at 04/21/24 0834, 2,000 mg at 04/21/24 0834    cetirizine (zyrTEC) tablet 10 mg, 10 mg, Oral, Daily, Rick Morales MD, 10 mg at 04/21/24 0833    cyclobenzaprine (FLEXERIL) tablet 10 mg, 10 mg, Oral, Nightly, Andrew,  Rick Otto MD, 10 mg at 04/20/24 2151    dilTIAZem (CARDIZEM) tablet 60 mg, 60 mg, Oral, Q6H, Rick Morales MD, 60 mg at 04/21/24 0545    diphenhydrAMINE (BENADRYL) capsule 25 mg, 25 mg, Oral, Once PRN, Rick Morales MD    HYDROcodone-acetaminophen (NORCO) 7.5-325 MG per tablet 1 tablet, 1 tablet, Oral, Q6H PRN, Rick Morales MD, 1 tablet at 04/20/24 2151    ipratropium-albuterol (DUO-NEB) nebulizer solution 3 mL, 3 mL, Nebulization, Q4H PRN, Rick Morales MD, 3 mL at 04/19/24 0722    Magnesium Standard Dose Replacement - Follow Nurse / BPA Driven Protocol, , Does not apply, PRN, Rick Morales MD    melatonin tablet 3 mg, 3 mg, Oral, Nightly PRN, Rick Morales MD, 3 mg at 04/20/24 2150    multivitamin with minerals 1 tablet, 1 tablet, Oral, Daily, Rick Morales MD, 1 tablet at 04/21/24 0834    nystatin (MYCOSTATIN) 100,000 unit/mL suspension 500,000 Units, 5 mL, Oral, 4x Daily, Rick Morales MD, 500,000 Units at 04/21/24 0834    ondansetron ODT (ZOFRAN-ODT) disintegrating tablet 4 mg, 4 mg, Oral, Q6H PRN, 4 mg at 04/20/24 2301 **OR** ondansetron (ZOFRAN) injection 4 mg, 4 mg, Intravenous, Q6H PRN, Rick Morales MD, 4 mg at 04/20/24 1205    pantoprazole (PROTONIX) EC tablet 40 mg, 40 mg, Oral, Q AM, Rick Morales MD, 40 mg at 04/21/24 0627    Phosphorus Replacement - Follow Nurse / BPA Driven Protocol, , Does not apply, PRN, Rick Morales MD    Potassium Replacement - Follow Nurse / BPA Driven Protocol, , Does not apply, PRN, Rick Morales MD    predniSONE (DELTASONE) tablet 20 mg, 20 mg, Oral, Daily With Breakfast, Arina Castaneda MD, 20 mg at 04/21/24 0833    roflumilast (DALIRESP) tablet 500 mcg, 500 mcg, Oral, Daily, Rick Morales MD, 500 mcg at 04/21/24 0833    rOPINIRole (REQUIP) tablet 2 mg, 2 mg, Oral, Nightly, Rick Morales MD, 2 mg at 04/20/24 2150    sertraline (ZOLOFT)  tablet 100 mg, 100 mg, Oral, Daily, Rick Morales MD, 100 mg at 04/21/24 0833    sodium chloride 0.9 % flush 10 mL, 10 mL, Intravenous, Q12H, Rick Morales MD, 10 mL at 04/20/24 2302    sodium chloride 0.9 % flush 10 mL, 10 mL, Intravenous, Q12H, Rick Morales MD, 10 mL at 04/20/24 2301    sodium chloride 0.9 % flush 10 mL, 10 mL, Intravenous, PRN, Rick Morales MD    sodium chloride 0.9 % flush 10 mL, 10 mL, Intravenous, Q12H, Loy Clayton MD, 10 mL at 04/21/24 0834    sodium chloride 0.9 % flush 10 mL, 10 mL, Intravenous, Q12H, Loy Clayton MD, 10 mL at 04/21/24 0834    sodium chloride 0.9 % flush 10 mL, 10 mL, Intravenous, PRN, Loy Clayton MD, 10 mL at 04/20/24 2301    sodium chloride 0.9 % flush 20 mL, 20 mL, Intravenous, PRN, Rick Morales MD    sodium chloride 0.9 % flush 20 mL, 20 mL, Intravenous, PRN, Loy Clayton MD    sodium chloride 0.9 % infusion 40 mL, 40 mL, Intravenous, PRN, Rick Morales MD    sodium chloride 0.9 % infusion, 30 mL/hr, Intravenous, Continuous PRN, Rick Morales MD, Last Rate: 30 mL/hr at 04/20/24 0848, 30 mL/hr at 04/20/24 0848    sodium chloride nasal spray 2 spray, 2 spray, Each Nare, PRN, Rick Morales MD    spironolactone (ALDACTONE) tablet 25 mg, 25 mg, Oral, Daily, Rick Morales MD, 25 mg at 04/21/24 0833    Objective     Vital Signs  Temp:  [97.5 °F (36.4 °C)-98.6 °F (37 °C)] 97.5 °F (36.4 °C)  Heart Rate:  [77-90] 82  Resp:  [14-18] 16  BP: (128-158)/(49-80) 133/54  Body mass index is 21.14 kg/m².    Intake/Output Summary (Last 24 hours) at 4/21/2024 0944  Last data filed at 4/20/2024 2320  Gross per 24 hour   Intake 240 ml   Output 700 ml   Net -460 ml     No intake/output data recorded.     Physical Exam:   General: patient awake, alert and cooperative   Abdomen: soft, nontender, nondistended; normal bowel sounds     Results Review:      I reviewed the patient's new clinical results.  Colonoscopy 4/20    Results from last 7 days   Lab Units 04/21/24  0628 04/20/24  0457 04/19/24  0422   WBC 10*3/mm3 12.83* 16.28* 15.12*   HEMOGLOBIN g/dL 8.7* 9.1* 9.4*   HEMATOCRIT % 28.1* 29.2* 30.6*   PLATELETS 10*3/mm3 239 269 268     Results from last 7 days   Lab Units 04/21/24  0628 04/20/24  0457 04/19/24  0422 04/17/24  0717 04/16/24  0648   SODIUM mmol/L 141 145 144   < > 144   POTASSIUM mmol/L 3.7 3.0* 3.7   < > 3.3*   CHLORIDE mmol/L 101 102 99   < > 94*   CO2 mmol/L 31.0* 32.6* 31.3*   < > 40.7*   BUN mg/dL 19 19 25*   < > 12   CREATININE mg/dL 0.83 0.72 0.95   < > 0.73   CALCIUM mg/dL 8.5* 8.5* 9.0   < > 8.6   BILIRUBIN mg/dL  --   --   --   --  0.4   ALK PHOS U/L  --   --   --   --  55   ALT (SGPT) U/L  --   --   --   --  11   AST (SGOT) U/L  --   --   --   --  16   GLUCOSE mg/dL 93 94 118*   < > 122*    < > = values in this interval not displayed.         Lab Results   Lab Value Date/Time    LIPASE 30 06/09/2023 0445    LIPASE 46 08/13/2017 0449       Radiology:  [unfilled]      Assessment & Plan   Assessment:   Anemia  COPD, severe  Colon polyps    Plan:   Negative EGD/colonoscopy this admission with regards to anemia  Office will call with path results  GI will sign off    I discussed the patient's findings and my recommendations with patient.          Rick Morales M.D.  [facility]  [address] [place] [address]  Office: [phone]

## 2024-04-21 NOTE — CASE MANAGEMENT/SOCIAL WORK
Continued Stay Note  [facility]     Patient Name: Paz Browne  MRN: [id number]  Today's Date: 4/21/2024    Admit Date: 4/11/2024    Plan: Plan home with family and [facility] for outpatient IV antibiotics. .  CRUZ Ashley RN   Discharge Plan       Row Name 04/21/24 1134       Plan    Plan Plan home with family and [facility] for outpatient IV antibiotics. .  CRUZ Ashley RN    Patient/Family in Agreement with Plan yes    Plan Comments Per Dr. Clayton pt will need-I recommend that she continue her previous antibiotic plan of ampicillin 2 g IV every 4 hours and ceftriaxone 2 g IV every 12 hours for 6 weeks course with stop date 5/13/2024.  PICC line is already in.  She will need weekly CBC with differential and BMP faxed to me at [phone].     Please note, at discharge the ampicillin dosing will change to 12 g IV every 24 hours via continuous infusion. Pt is current with [facility].  Verónica ([phone]) with [facility] called to deliver outpatient antibiotics.  Plan home with family and [facility] for outpatient IV antibiotics. . CRUZ Ashley RN                   Discharge Codes    No documentation.                 Expected Discharge Date and Time       Expected Discharge Date Expected Discharge Time    Apr 21, 2024               Amada Ashley RN

## 2024-04-22 ENCOUNTER — READMISSION MANAGEMENT (OUTPATIENT)
Dept: CALL CENTER | Facility: HOSPITAL | Age: 69
End: 2024-04-22
Payer: MEDICARE

## 2024-04-22 LAB
LAB AP CASE REPORT: NORMAL
PATH REPORT.FINAL DX SPEC: NORMAL
PATH REPORT.GROSS SPEC: NORMAL

## 2024-04-22 NOTE — OUTREACH NOTE
CHF Week 1 Survey      Flowsheet Row Responses   [facility] patient discharged from? [place]   Does the patient have one of the following disease processes/diagnoses(primary or secondary)? CHF   CHF Week 1 attempt successful? Yes   Call start time 1227   Call end time 1236   Discharge diagnosis Acute on chronic diastolic congestive heart failure (   Person spoke with today (if not patient) and relationship spouse   Medication alerts for this patient PICC infusions, per pt's , he manages the infusion.   Meds reviewed with patient/caregiver? Yes   Does the patient have all medications ordered at discharge? Yes   Is the patient taking all medications as directed (includes completed medication regime)? Yes   Medication comments  reports that he has organized the pt's meds   Comments regarding appointments [facility] for weekly PICC care/labs.  Pt referral for CHF Clinic, pt's  declines to be transferred to Clinic or take contact information stating that the pt is following up with Cardiology and that is adequate. Updates sent to HF Clinic schedular, Melissa.   Does the patient have a primary care provider?  Yes   Does the patient have an appointment with their PCP within 7 days of discharge? Yes   Has the patient kept scheduled appointments due by today? N/A   Comments pt's  reports the PICC care is performed Friday's at [facility] office.   What is the Home health agency?  .   Comments Per pt's  the pt is improving and her SOA is much improved allowing pt to ambulate and resume more activity. Pt is monitoring her daily wts,  reports and they are aware of when to call MD. Per  the pt is on fluid and NA restriction.   Did the patient receive a copy of their discharge instructions? Yes   Nursing interventions Reviewed instructions with patient   What is the patient's perception of their health status since discharge? Improving   Nursing interventions Nurse  provided patient education   Is the patient able to teach back signs and symptoms of worsening condition? (i.e. weight gain, shortness of air, etc.) Yes   Is the patient/caregiver able to teach back the hierarchy of who to call/visit for symptoms/problems? PCP, Specialist, Home health nurse, Urgent Care, ED, 911 Yes   Is the patient able to teach back Heart Failure Zones? Yes   CHF Zone this Call Green Zone   Green Zone Patient reports doing well, Weight check stable, Physical activity level is normal for you, No new or worsening shortness of breath   Green Zone Interventions Meds as directed, Daily weight check    CHF Week 1 call completed? Yes   Wrap up additional comments Brief call with spouse, states patient is doing well, no questions.   Call end time 1236            Cristy SNOW - Registered Nurse

## 2024-04-22 NOTE — OUTREACH NOTE
Prep Survey      Flowsheet Row Responses   [facility] patient discharged from? [facility]   Is LACE score < 7 ? No   Eligibility Readm Mgmt   Discharge diagnosis Acute on chronic diastolic congestive heart failure (   Does the patient have one of the following disease processes/diagnoses(primary or secondary)? CHF   Does the patient have Home health ordered? Yes   What is the Home health agency?  [facility]   Is there a DME ordered? No   Prep survey completed? Yes            LIAM CLINTON - Registered Nurse

## 2024-04-23 LAB
LAB AP CASE REPORT: NORMAL
LAB AP DIAGNOSIS COMMENT: NORMAL
PATH REPORT.FINAL DX SPEC: NORMAL
PATH REPORT.GROSS SPEC: NORMAL

## 2024-04-25 ENCOUNTER — TELEPHONE (OUTPATIENT)
Dept: GASTROENTEROLOGY | Facility: CLINIC | Age: 69
End: 2024-04-25
Payer: MEDICARE

## 2024-04-25 NOTE — TELEPHONE ENCOUNTER
----- Message from Rick Morales MD sent at 4/22/2024 11:16 AM EDT -----  Benign no follow up needed

## 2024-04-26 ENCOUNTER — HOSPITAL ENCOUNTER (EMERGENCY)
Facility: HOSPITAL | Age: 69
Discharge: HOME | End: 2024-04-26
Attending: EMERGENCY MEDICINE
Payer: MEDICARE

## 2024-04-26 VITALS
OXYGEN SATURATION: 99 % | HEIGHT: 67 IN | DIASTOLIC BLOOD PRESSURE: 51 MMHG | TEMPERATURE: 98.3 F | BODY MASS INDEX: 21.18 KG/M2 | WEIGHT: 134.92 LBS | SYSTOLIC BLOOD PRESSURE: 114 MMHG | RESPIRATION RATE: 16 BRPM | HEART RATE: 95 BPM

## 2024-04-26 DIAGNOSIS — D64.9 CHRONIC ANEMIA: ICD-10-CM

## 2024-04-26 DIAGNOSIS — E87.6 HYPOKALEMIA: Primary | ICD-10-CM

## 2024-04-26 LAB
ALBUMIN SERPL-MCNC: 3.9 G/DL (ref 3.5–5.2)
ALBUMIN/GLOB SERPL: 1.9 G/DL
ALP SERPL-CCNC: 65 U/L (ref 39–117)
ALT SERPL W P-5'-P-CCNC: 22 U/L (ref 1–33)
ANION GAP SERPL CALCULATED.3IONS-SCNC: 11 MMOL/L (ref 5–15)
AST SERPL-CCNC: 16 U/L (ref 1–32)
BASOPHILS # BLD AUTO: 0.03 10*3/MM3 (ref 0–0.2)
BASOPHILS NFR BLD AUTO: 0.2 % (ref 0–1.5)
BILIRUB SERPL-MCNC: 0.4 MG/DL (ref 0–1.2)
BUN SERPL-MCNC: 12 MG/DL (ref 8–23)
BUN/CREAT SERPL: 10.4 (ref 7–25)
CALCIUM SPEC-SCNC: 8.3 MG/DL (ref 8.6–10.5)
CHLORIDE SERPL-SCNC: 94 MMOL/L (ref 98–107)
CO2 SERPL-SCNC: 33 MMOL/L (ref 22–29)
CREAT SERPL-MCNC: 1.15 MG/DL (ref 0.57–1)
DEPRECATED RDW RBC AUTO: 51.4 FL (ref 37–54)
EGFRCR SERPLBLD CKD-EPI 2021: 51.4 ML/MIN/1.73
EOSINOPHIL # BLD AUTO: 0.6 10*3/MM3 (ref 0–0.4)
EOSINOPHIL NFR BLD AUTO: 3.7 % (ref 0.3–6.2)
ERYTHROCYTE [DISTWIDTH] IN BLOOD BY AUTOMATED COUNT: 16.6 % (ref 12.3–15.4)
GLOBULIN UR ELPH-MCNC: 2.1 GM/DL
GLUCOSE SERPL-MCNC: 111 MG/DL (ref 65–99)
HCT VFR BLD AUTO: 25.1 % (ref 34–46.6)
HGB BLD-MCNC: 8.2 G/DL (ref 12–15.9)
HOLD SPECIMEN: NORMAL
HOLD SPECIMEN: NORMAL
IMM GRANULOCYTES # BLD AUTO: 0.1 10*3/MM3 (ref 0–0.05)
IMM GRANULOCYTES NFR BLD AUTO: 0.6 % (ref 0–0.5)
LYMPHOCYTES # BLD AUTO: 1.43 10*3/MM3 (ref 0.7–3.1)
LYMPHOCYTES NFR BLD AUTO: 8.9 % (ref 19.6–45.3)
MAGNESIUM SERPL-MCNC: 2.1 MG/DL (ref 1.6–2.4)
MCH RBC QN AUTO: 28.4 PG (ref 26.6–33)
MCHC RBC AUTO-ENTMCNC: 32.7 G/DL (ref 31.5–35.7)
MCV RBC AUTO: 86.9 FL (ref 79–97)
MONOCYTES # BLD AUTO: 0.74 10*3/MM3 (ref 0.1–0.9)
MONOCYTES NFR BLD AUTO: 4.6 % (ref 5–12)
NEUTROPHILS NFR BLD AUTO: 13.19 10*3/MM3 (ref 1.7–7)
NEUTROPHILS NFR BLD AUTO: 82 % (ref 42.7–76)
NRBC BLD AUTO-RTO: 0 /100 WBC (ref 0–0.2)
PLATELET # BLD AUTO: 193 10*3/MM3 (ref 140–450)
PMV BLD AUTO: 10.4 FL (ref 6–12)
POTASSIUM SERPL-SCNC: 2.8 MMOL/L (ref 3.5–5.2)
PROT SERPL-MCNC: 6 G/DL (ref 6–8.5)
QT INTERVAL: 408 MS
QTC INTERVAL: 529 MS
RBC # BLD AUTO: 2.89 10*6/MM3 (ref 3.77–5.28)
SODIUM SERPL-SCNC: 138 MMOL/L (ref 136–145)
WBC NRBC COR # BLD AUTO: 16.09 10*3/MM3 (ref 3.4–10.8)
WHOLE BLOOD HOLD COAG: NORMAL
WHOLE BLOOD HOLD SPECIMEN: NORMAL

## 2024-04-26 PROCEDURE — 93005 ELECTROCARDIOGRAM TRACING: CPT | Performed by: EMERGENCY MEDICINE

## 2024-04-26 PROCEDURE — 93010 ELECTROCARDIOGRAM REPORT: CPT | Performed by: INTERNAL MEDICINE

## 2024-04-26 PROCEDURE — 80053 COMPREHEN METABOLIC PANEL: CPT | Performed by: EMERGENCY MEDICINE

## 2024-04-26 PROCEDURE — 25810000003 SODIUM CHLORIDE 0.9 % SOLUTION: Performed by: EMERGENCY MEDICINE

## 2024-04-26 PROCEDURE — 25010000002 POTASSIUM CHLORIDE 10 MEQ/100ML SOLUTION: Performed by: EMERGENCY MEDICINE

## 2024-04-26 PROCEDURE — 36415 COLL VENOUS BLD VENIPUNCTURE: CPT

## 2024-04-26 PROCEDURE — 96365 THER/PROPH/DIAG IV INF INIT: CPT

## 2024-04-26 PROCEDURE — 83735 ASSAY OF MAGNESIUM: CPT | Performed by: EMERGENCY MEDICINE

## 2024-04-26 PROCEDURE — 85025 COMPLETE CBC W/AUTO DIFF WBC: CPT | Performed by: EMERGENCY MEDICINE

## 2024-04-26 PROCEDURE — 99283 EMERGENCY DEPT VISIT LOW MDM: CPT

## 2024-04-26 RX ORDER — POTASSIUM CHLORIDE 7.45 MG/ML
10 INJECTION INTRAVENOUS ONCE
Status: COMPLETED | OUTPATIENT
Start: 2024-04-26 | End: 2024-04-26

## 2024-04-26 RX ORDER — SODIUM CHLORIDE 9 MG/ML
125 INJECTION, SOLUTION INTRAVENOUS CONTINUOUS
Status: DISCONTINUED | OUTPATIENT
Start: 2024-04-26 | End: 2024-04-26 | Stop reason: HOSPADM

## 2024-04-26 RX ORDER — POTASSIUM CHLORIDE 750 MG/1
20 TABLET, FILM COATED, EXTENDED RELEASE ORAL 3 TIMES DAILY
Qty: 180 TABLET | Refills: 0 | Status: SHIPPED | OUTPATIENT
Start: 2024-04-26 | End: 2024-05-26

## 2024-04-26 RX ADMIN — SODIUM CHLORIDE 125 ML/HR: 9 INJECTION, SOLUTION INTRAVENOUS at 13:12

## 2024-04-26 RX ADMIN — POTASSIUM CHLORIDE 10 MEQ: 7.46 INJECTION, SOLUTION INTRAVENOUS at 13:13

## 2024-04-26 NOTE — ED NOTES
Pt to ED with family, called by pcp office told her labs drawn yesterday resulted with K of 2.8. pt just recently DC'd from here, taken off 2 bp meds and K rx reduced from 120 meq a day to 10 meq/ day per family. Pt has no c/o.

## 2024-04-26 NOTE — ED NOTES
Patient to ER via car from home for low K+  Patient had recent change in dosage     Patient wears 3L nc at all times

## 2024-04-26 NOTE — ED PROVIDER NOTES
EMERGENCY DEPARTMENT ENCOUNTER  Room Number:  20/20  PCP: Javan Martniez MD  Independent Historians: Patient and her       HPI:  Chief Complaint: Low potassium level    A complete HPI/ROS/PMH/PSH/SH/FH are unobtainable due to: None    Chronic or social conditions impacting patient care (Social Determinants of Health): None      Context: Paz Browne is a 68 y.o. female with a medical history of hypertension, hyperlipidemia and COPD who presents to the ED c/o acute abnormally low potassium level on outpatient lab from yesterday.  Patient followed up at her PCP office yesterday after being released from this hospital on Sunday due to some CHF and COPD symptoms.  She says that she has been feeling well this week.  She says that her breathing is much improved and she has had some better energy for her normal activities.  During her follow-up with PCP yesterday there was outpatient labs drawn.  She was called today because her potassium level was 2.8 and her PCP advised her to come here for further evaluation.  Patient tells me that they had changed her potassium supplement prescription from 120 mill equivalents per day to 10 mill equivalents per day since her recent discharge.      Review of prior external notes (non-ED) -and- Review of prior external test results outside of this encounter: I independently reviewed the hospital discharge summary from April 21, 2024 when she was admitted here for acute on chronic CHF as well as COPD symptoms.    Prescription drug monitoring program review: [facility] reviewed by Jose Daniel Stanley MD   N/A and [employer] query complete and reviewed. Patient receives regular prescriptions for controlled substances.    PAST MEDICAL HISTORY  Active Ambulatory Problems     Diagnosis Date Noted    PAF (paroxysmal atrial fibrillation) 01/25/2016    Hypertension     Hyperlipidemia     Pain medication agreement 05/04/2016    Hiatal hernia 05/04/2016    Primary osteoarthritis involving  multiple joints 05/04/2016    Pulmonary hypertension     S/P TVR (tricuspid valve repair) 07/07/2016    Pulmonary nodule 10/13/2016    Restless leg syndrome 11/18/2016    Chronic low back pain 08/02/2017    Dysplastic polyp of colon 08/07/2017    Lower GI bleed 08/09/2017    History of mitral valve replacement with tissue graft 08/11/2017    Chronic hypoxemic respiratory failure 08/13/2017    Anemia 08/09/2017    Celiac artery stenosis 08/24/2017    Intertrigo 08/25/2017    Abnormal EKG 06/30/2019    Muscle spasms of both lower extremities 12/22/2020    Iron deficiency anemia 03/30/2021    Adenomatous polyp of colon 03/30/2021    Gastroesophageal reflux disease without esophagitis 03/30/2021    Headache 07/04/2021    History of endocarditis 02/03/2022    Pneumonia of both lower lobes due to infectious organism 10/16/2022    Peptic ulcer disease 10/17/2022    Peripheral vascular disease 10/17/2022    Hyperlipidemia 10/17/2022    Hyperglycemia 10/17/2022    COPD with acute exacerbation 10/19/2022    Chronic diastolic CHF (congestive heart failure) 03/29/2023    Chronic bilateral low back pain 04/19/2023    COPD exacerbation 11/17/2023    Leukocytosis 03/02/2024    Elevated troponin 03/02/2024    Pneumonia 04/01/2024    Acute-on-chronic respiratory failure 04/01/2024    Septicemia due to enterococcus 04/02/2024    Acute on chronic diastolic congestive heart failure 04/05/2024    Sepsis 04/11/2024    COPD (chronic obstructive pulmonary disease) 04/12/2024    Esophagitis 04/20/2024    Acute heart failure with preserved ejection fraction (HFpEF) 04/21/2024     Resolved Ambulatory Problems     Diagnosis Date Noted    Tachycardia     Renal failure     Palpitations     Mitral regurgitation     Heart failure 06/08/2016    Long term current use of anticoagulant 10/13/2016    Aspiration pneumonia 10/14/2016    Hemoptysis 10/14/2016    Precordial pain 08/11/2017    Oral candidiasis 08/14/2017    VAN (acute kidney injury)  08/15/2017    GI bleed 12/01/2017    Acute exacerbation of chronic obstructive pulmonary disease (COPD) 01/02/2018    Pneumonia due to infectious organism 07/18/2018    Insomnia due to medical condition 02/01/2019    COPD (chronic obstructive pulmonary disease) 06/30/2019    Shingles 04/08/2020    Vertigo 10/06/2020    Dark stools 03/30/2021    Acute hyperkalemia 05/10/2021    Tremor 05/10/2021    Anemia 05/10/2021    COPD exacerbation 06/14/2021    Septicemia 07/02/2021    Bacteremia 07/03/2021    Acute hypoxemic respiratory failure 10/17/2022    COPD (chronic obstructive pulmonary disease) 10/17/2022    Pulmonary nodule 10/17/2022    Hypokalemia 10/17/2022    Chronic respiratory failure 10/19/2022     Past Medical History:   Diagnosis Date    Asthma     Atrial flutter     Cataract     Chronic respiratory failure with hypoxia     Colon polyp     History of CHF (congestive heart failure)     History of home oxygen therapy     Infectious viral hepatitis     Long term (current) use of anticoagulants          PAST SURGICAL HISTORY  Past Surgical History:   Procedure Laterality Date    CARDIAC CATHETERIZATION  09/01/2014    Right dominant systemt, normal coronary arteries.     CARDIAC CATHETERIZATION Left 6/10/2016    Procedure: Cardiac catheterization;  Surgeon: Sergei Hall MD;  Location: [facility] CATH INVASIVE LOCATION;  Service:     CARDIAC CATHETERIZATION N/A 6/10/2016    Procedure: Right Heart Cath;  Surgeon: Sergei Hall MD;  Location: [facility] CATH INVASIVE LOCATION;  Service:     CATARACT EXTRACTION      COLONOSCOPY      COLONOSCOPY N/A 8/4/2017    Procedure: COLONOSCOPY TO CECUM/TI WITH POLYPECTOMY ( COLD BX);  Surgeon: Cleveland Devine MD;  Location: [facility] ENDOSCOPY;  Service:     COLONOSCOPY N/A 8/10/2017    Procedure: COLONOSCOPY to cecum and TI with 2 clips placed at transverse;  Surgeon: Earnest PALOMO MD;  Location: [facility] ENDOSCOPY;  Service:     COLONOSCOPY N/A 12/22/2017    Procedure: COLONOSCOPY INTO  CECUM WITH COLD POLYPECTOMIES;  Surgeon: Cleveland Devine MD;  Location: [facility] ENDOSCOPY;  Service:     COLONOSCOPY N/A 5/17/2021    Procedure: COLONOSCOPY to cecum;  Surgeon: Cleveland Devine MD;  Location: [facility] ENDOSCOPY;  Service: Gastroenterology;  Laterality: N/A;  Pre: Fe deficency anemia, h/x of polyps  Post: fair prep, normal    COLONOSCOPY W/ POLYPECTOMY N/A 4/20/2024    Procedure: COLONOSCOPY to cecum with cold and hot snare polypectomies with saline lift and APC;  Surgeon: David Payne MD;  Location: [facility] ENDOSCOPY;  Service: Gastroenterology;  Laterality: N/A;  pre- anemia  post- adequate prep, polyps, avm    ENDOSCOPY N/A 8/17/2017    Procedure: ESOPHAGOGASTRODUODENOSCOPY;  Surgeon: Porsha Ruby MD;  Location: [facility] ENDOSCOPY;  Service:     ENDOSCOPY N/A 12/22/2017    Procedure: ESOPHAGOGASTRODUODENOSCOPY WITH BIOPSIES;  Surgeon: Cleveland Devine MD;  Location: [facility] ENDOSCOPY;  Service:     ENDOSCOPY N/A 5/17/2021    Procedure: ESOPHAGOGASTRODUODENOSCOPY  with biopsies;  Surgeon: Cleveland Devine MD;  Location: [facility] ENDOSCOPY;  Service: Gastroenterology;  Laterality: N/A;  Pre: Fe deficency anemia, nausea, heme positive stool   Post: gastritis, sloughing of distal esophagus mucosa    ENDOSCOPY N/A 4/19/2024    Procedure: ESOPHAGOGASTRODUODENOSCOPY WITH BIOPSY;  Surgeon: Rick Morales MD;  Location: [facility] ENDOSCOPY;  Service: Gastroenterology;  Laterality: N/A;  PRE: ANEMIA /  POST: ESOPHAGITIS    GALLBLADDER SURGERY      HEMORRHOIDECTOMY      HYSTERECTOMY      KIDNEY SURGERY  04/22/2013    Stent placement    MAZE PROCEDURE      MITRAL VALVE REPLACEMENT      TONSILLECTOMY      TRICUSPID VALVE REPLACEMENT           FAMILY HISTORY  Family History   Adopted: Yes   Problem Relation Age of Onset    No Known Problems Mother     No Known Problems Father          SOCIAL HISTORY  Social History     Socioeconomic History    Marital status:     Number of children: 2   Tobacco Use    Smoking  status: Former     Current packs/day: 0.00     Average packs/day: 3.0 packs/day for 54.0 years (162.0 ttl pk-yrs)     Types: Cigarettes     Start date:      Quit date:      Years since quittin.3     Passive exposure: Past    Smokeless tobacco: Never    Tobacco comments:     caffeine - tea or mt dew    Vaping Use    Vaping status: Never Used   Substance and Sexual Activity    Alcohol use: No    Drug use: No    Sexual activity: Defer         ALLERGIES  Bupropion, Cephalexin, and Metoprolol      REVIEW OF SYSTEMS  Review of Systems  Included in HPI  All systems reviewed and negative except for those discussed in HPI.      PHYSICAL EXAM    I have reviewed the triage vital signs and nursing notes.    ED Triage Vitals   Temp Heart Rate Resp BP SpO2   24 1056 24 1056 24 1056 24 1146 24 1056   98.3 °F (36.8 °C) 97 20 116/90 95 %      Temp src Heart Rate Source Patient Position BP Location FiO2 (%)   -- -- -- -- --              Physical Exam  GENERAL: alert, no acute distress, pleasant and smiling  SKIN: Warm, dry, no rashes  HENT: Normocephalic, atraumatic  EYES: no scleral icterus, normal conjunctiva  CV: regular rhythm, regular rate  RESPIRATORY: normal effort, lungs clear bilaterally  ABDOMEN: soft, nondistended, nontender  MUSCULOSKELETAL: no deformity, no edema or asymmetry to lower extremities  NEURO: alert, moves all extremities, follows commands      LAB RESULTS  Recent Results (from the past 24 hour(s))   Green Top (Gel)    Collection Time: 24 11:46 AM   Result Value Ref Range    Extra Tube Hold for add-ons.    Lavender Top    Collection Time: 24 11:46 AM   Result Value Ref Range    Extra Tube hold for add-on    Gold Top - SST    Collection Time: 24 11:46 AM   Result Value Ref Range    Extra Tube Hold for add-ons.    Light Blue Top    Collection Time: 24 11:46 AM   Result Value Ref Range    Extra Tube Hold for add-ons.    Comprehensive Metabolic  Panel    Collection Time: 04/26/24 11:46 AM    Specimen: Blood   Result Value Ref Range    Glucose 111 (H) 65 - 99 mg/dL    BUN 12 8 - 23 mg/dL    Creatinine 1.15 (H) 0.57 - 1.00 mg/dL    Sodium 138 136 - 145 mmol/L    Potassium 2.8 (L) 3.5 - 5.2 mmol/L    Chloride 94 (L) 98 - 107 mmol/L    CO2 33.0 (H) 22.0 - 29.0 mmol/L    Calcium 8.3 (L) 8.6 - 10.5 mg/dL    Total Protein 6.0 6.0 - 8.5 g/dL    Albumin 3.9 3.5 - 5.2 g/dL    ALT (SGPT) 22 1 - 33 U/L    AST (SGOT) 16 1 - 32 U/L    Alkaline Phosphatase 65 39 - 117 U/L    Total Bilirubin 0.4 0.0 - 1.2 mg/dL    Globulin 2.1 gm/dL    A/G Ratio 1.9 g/dL    BUN/Creatinine Ratio 10.4 7.0 - 25.0    Anion Gap 11.0 5.0 - 15.0 mmol/L    eGFR 51.4 (L) >60.0 mL/min/1.73   Magnesium    Collection Time: 04/26/24 11:46 AM    Specimen: Blood   Result Value Ref Range    Magnesium 2.1 1.6 - 2.4 mg/dL   CBC Auto Differential    Collection Time: 04/26/24 11:46 AM    Specimen: Blood   Result Value Ref Range    WBC 16.09 (H) 3.40 - 10.80 10*3/mm3    RBC 2.89 (L) 3.77 - 5.28 10*6/mm3    Hemoglobin 8.2 (L) 12.0 - 15.9 g/dL    Hematocrit 25.1 (L) 34.0 - 46.6 %    MCV 86.9 79.0 - 97.0 fL    MCH 28.4 26.6 - 33.0 pg    MCHC 32.7 31.5 - 35.7 g/dL    RDW 16.6 (H) 12.3 - 15.4 %    RDW-SD 51.4 37.0 - 54.0 fl    MPV 10.4 6.0 - 12.0 fL    Platelets 193 140 - 450 10*3/mm3    Neutrophil % 82.0 (H) 42.7 - 76.0 %    Lymphocyte % 8.9 (L) 19.6 - 45.3 %    Monocyte % 4.6 (L) 5.0 - 12.0 %    Eosinophil % 3.7 0.3 - 6.2 %    Basophil % 0.2 0.0 - 1.5 %    Immature Grans % 0.6 (H) 0.0 - 0.5 %    Neutrophils, Absolute 13.19 (H) 1.70 - 7.00 10*3/mm3    Lymphocytes, Absolute 1.43 0.70 - 3.10 10*3/mm3    Monocytes, Absolute 0.74 0.10 - 0.90 10*3/mm3    Eosinophils, Absolute 0.60 (H) 0.00 - 0.40 10*3/mm3    Basophils, Absolute 0.03 0.00 - 0.20 10*3/mm3    Immature Grans, Absolute 0.10 (H) 0.00 - 0.05 10*3/mm3    nRBC 0.0 0.0 - 0.2 /100 WBC   ECG 12 Lead Electrolyte Imbalance    Collection Time: 04/26/24 12:28 PM    Result Value Ref Range    QT Interval 408 ms    QTC Interval 529 ms         RADIOLOGY  No Radiology Exams Resulted Within Past 24 Hours      MEDICATIONS GIVEN IN ER  Medications   sodium chloride 0.9 % infusion (0 mL/hr Intravenous Stopped 4/26/24 1433)   potassium chloride 10 mEq in 100 mL IVPB (0 mEq Intravenous Stopped 4/26/24 1432)         ORDERS PLACED DURING THIS VISIT:  Orders Placed This Encounter   Procedures    [place] Draw    Comprehensive Metabolic Panel    Urinalysis With Microscopic If Indicated (No Culture) - Urine, Clean Catch    Magnesium    CBC Auto Differential    Monitor Blood Pressure    Continuous Pulse Oximetry    ECG 12 Lead Electrolyte Imbalance    Telemetry Scan    Green Top (Gel)    Lavender Top    Gold Top - SST    Light Blue Top    CBC & Differential         OUTPATIENT MEDICATION MANAGEMENT:  Current Facility-Administered Medications Ordered in Epic   Medication Dose Route Frequency Provider Last Rate Last Admin    sodium chloride 0.9 % infusion  125 mL/hr Intravenous Continuous Jose Daniel Stanley MD   Stopped at 04/26/24 1433     Current Outpatient Medications Ordered in Epic   Medication Sig Dispense Refill    potassium chloride 10 MEQ CR tablet Take 2 tablets by mouth 3 times a day for 30 days. 180 tablet 0    albuterol sulfate  (90 Base) MCG/ACT inhaler Inhale 2 puffs Every 4 (Four) Hours As Needed for Wheezing. 18 g 3    ampicillin 12 g in sodium chloride 0.9 % 500 mL Infuse 12 g into a venous catheter Continuous for 23 days. Continuous infusion IV ampicillin 12 g every 24 hours      apixaban (ELIQUIS) 5 MG tablet tablet Take 1 tablet by mouth Every 12 (Twelve) Hours. Indications: Atrial Fibrillation      atorvastatin (LIPITOR) 40 MG tablet TAKE 1 TABLET BY MOUTH EVERY DAY 90 tablet 2    budesonide (PULMICORT) 0.5 MG/2ML nebulizer solution Take 2 mL by nebulization 2 (Two) Times a Day for 30 days. Indications: Chronic Obstructive Lung Disease, Chronic hypoxic respiratory  failure 360 mL 3    bumetanide (BUMEX) 2 MG tablet Take 1 tablet by mouth 2 (Two) Times a Day for 30 days. 60 tablet 0    cefTRIAXone 2,000 mg in sodium chloride 0.9 % 100 mL IVPB Infuse 2,000 mg into a venous catheter Every 12 (Twelve) Hours for 76 doses. Indications: Bacteria in the Blood, Endocarditis      cyclobenzaprine (FLEXERIL) 10 MG tablet TAKE 1 TABLET BY MOUTH EVERYDAY AT BEDTIME 90 tablet 3    dilTIAZem (CARDIZEM) 60 MG tablet Take 1 tablet by mouth Every 6 (Six) Hours for 30 days. 120 tablet 0    docosanol (ABREVA) 10 % cream cream Apply 1 Application topically to the appropriate area as directed 5 (Five) Times a Day. 2 g 0    HYDROcodone-acetaminophen (NORCO) 7.5-325 MG per tablet Take 1 tablet by mouth Every 6 (Six) Hours As Needed for Moderate Pain (Chronic pain medicine for low back pain). 90 tablet 0    ipratropium-albuterol (DUO-NEB) 0.5-2.5 mg/3 ml nebulizer Take 3 mL by nebulization Every 4 (Four) Hours As Needed for Wheezing. 360 mL 3    loperamide (IMODIUM) 2 MG capsule Take 1 capsule by mouth 4 (Four) Times a Day As Needed for Diarrhea. 20 capsule 0    loratadine (CLARITIN) 10 MG tablet Take 1 tablet by mouth 2 (two) times a day.      Multiple Vitamins-Minerals (MULTIVITAL) tablet Take 1 tablet by mouth Daily.      Nebulizer device 1 Units 4 (Four) Times a Day As Needed (Wheezing). J44.1 j20.8 1 each 0    O2 (OXYGEN) Inhale 2 L/min Continuous.      ondansetron ODT (ZOFRAN-ODT) 4 MG disintegrating tablet Place 1 tablet on the tongue Every 8 (Eight) Hours As Needed for Nausea or Vomiting. 10 tablet 0    pantoprazole (PROTONIX) 40 MG EC tablet Take 1 tablet by mouth 2 (Two) Times a Day for 30 days. 60 tablet 0    predniSONE (DELTASONE) 10 MG tablet Take 1 tablet by mouth Daily With Breakfast for 3 days. 3 tablet 0    roflumilast (DALIRESP) 500 MCG tablet tablet Take 1 tablet by mouth Daily. 90 tablet 1    rOPINIRole (REQUIP) 2 MG tablet TAKE 1 TABLET BY MOUTH EVERY DAY AT NIGHT 90 tablet 2     sertraline (ZOLOFT) 100 MG tablet TAKE 1 TABLET BY MOUTH EVERY DAY 90 tablet 1    spironolactone (ALDACTONE) 25 MG tablet Take 1 tablet by mouth Daily for 30 days. 30 tablet 0    Umeclidinium-Vilanterol (Anoro Ellipta) 62.5-25 MCG/ACT aerosol powder  inhaler Inhale 1 puff Daily. 60 each 1    zolpidem (AMBIEN) 10 MG tablet Take 1 tablet by mouth At Night As Needed for Sleep. 30 tablet 3         PROCEDURES  Procedures        PROGRESS, DATA ANALYSIS, CONSULTS, AND MEDICAL DECISION MAKING  All labs have been independently interpreted by me.  All radiology studies have been reviewed by me. All EKG's have been independently viewed and interpreted by me.  Discussion below represents my analysis of pertinent findings related to patient's condition, differential diagnosis, treatment plan and final disposition.    Differential diagnosis includes but is not limited to hypokalemia, VAN, arrhythmia, hypomagnesemia.    Clinical Scores:                   ED Course as of 04/26/24 1441   Fri Apr 26, 2024   1206 WBC(!): 16.09 [TE]   1206 Hemoglobin(!): 8.2 [TE]   1206 Hematocrit(!): 25.1 [TE]   1206 Review of labs indicates CBC, H&H are similar and past recent values. [TE]   1235 Potassium(!): 2.8 [TE]   1241 EKG         EKG time/Interp time: 1228/1234  Rhythm/Rate: Atrial fibrillation, 101 bpm  P waves and OK: None  QRS, axis: 101 ms, normal axis  ST and T waves: No ST segment elevation present.  Independently interpreted by me contemporaneously with treatment   [TE]   1304 I discussed with Dr. Martinez, the patient's PCP.  He request that we give 1 dose of IV KCl at this time.  Then he believes patient is okay to discharge home and follow-up in his office probably next week for recheck of labs.  He request that she resume 20 mill equivalents 3 times daily of potassium supplement. [TE]   0768 Patient's KCl infusion is completed now.  I think she is stable for discharge home.  Will have her resume her previous KCl schedule and dosage  "per her PCPs recommendations.  She will follow-up promptly in the office for recheck of blood work early next week.  Will review with her the usual \"return to ER \"instructions prior to discharge. [TE]      ED Course User Index  [TE] Jose Daniel Stanley MD         AS OF 14:41 EDT VITALS:    BP - 114/51  HR - 95  TEMP - 98.3 °F (36.8 °C)  O2 SATS - 99%    COMPLEXITY OF CARE  Admission was considered but after careful review of the patient's presentation, physical examination, diagnostic results, and response to treatment the patient may be safely discharged with outpatient follow-up.      DIAGNOSIS  Final diagnoses:   Hypokalemia   Chronic anemia         DISPOSITION  ED Disposition       ED Disposition   Discharge    Condition   Stable    Comment   --                Please note that portions of this document were completed with a voice recognition program.    Note Disclaimer: At [facility], we believe that sharing information builds trust and better relationships. You are receiving this note because you recently visited [facility]. It is possible you will see health information before a provider has talked with you about it. This kind of information can be easy to misunderstand. To help you fully understand what it means for your health, we urge you to discuss this note with your provider.         Jose Daniel Stanley MD  04/26/24 1511    "

## 2024-04-26 NOTE — DISCHARGE INSTRUCTIONS
Resume your potassium chloride dosage of 20 mill equivalents 3 times daily as we discussed.  Must follow-up with Dr. Martinez as soon as possible next week for repeat evaluation and recheck of your blood test.  Please return to the emergency room for any worsening symptoms or concerns.

## 2024-04-29 ENCOUNTER — HOSPITAL ENCOUNTER (OUTPATIENT)
Facility: HOSPITAL | Age: 69
Setting detail: OBSERVATION
Discharge: HOME | End: 2024-05-01
Attending: EMERGENCY MEDICINE | Admitting: EMERGENCY MEDICINE
Payer: MEDICARE

## 2024-04-29 DIAGNOSIS — Z99.81 CHRONIC HYPOXIC RESPIRATORY FAILURE, ON HOME OXYGEN THERAPY: ICD-10-CM

## 2024-04-29 DIAGNOSIS — J96.11 CHRONIC HYPOXIC RESPIRATORY FAILURE, ON HOME OXYGEN THERAPY: ICD-10-CM

## 2024-04-29 DIAGNOSIS — R04.0 EPISTAXIS: Primary | ICD-10-CM

## 2024-04-29 DIAGNOSIS — Z79.01 ANTICOAGULATED: ICD-10-CM

## 2024-04-29 DIAGNOSIS — D64.9 ANEMIA, UNSPECIFIED TYPE: ICD-10-CM

## 2024-04-29 LAB
ABO GROUP BLD: NORMAL
ALBUMIN SERPL-MCNC: 3.8 G/DL (ref 3.5–5.2)
ALBUMIN/GLOB SERPL: 1.7 G/DL
ALP SERPL-CCNC: 74 U/L (ref 39–117)
ALT SERPL W P-5'-P-CCNC: 19 U/L (ref 1–33)
ANION GAP SERPL CALCULATED.3IONS-SCNC: 13.6 MMOL/L (ref 5–15)
AST SERPL-CCNC: 24 U/L (ref 1–32)
BASOPHILS # BLD AUTO: 0.05 10*3/MM3 (ref 0–0.2)
BASOPHILS NFR BLD AUTO: 0.3 % (ref 0–1.5)
BILIRUB SERPL-MCNC: 0.7 MG/DL (ref 0–1.2)
BLD GP AB SCN SERPL QL: NEGATIVE
BUN SERPL-MCNC: 11 MG/DL (ref 8–23)
BUN/CREAT SERPL: 10.1 (ref 7–25)
CALCIUM SPEC-SCNC: 8.7 MG/DL (ref 8.6–10.5)
CHLORIDE SERPL-SCNC: 91 MMOL/L (ref 98–107)
CO2 SERPL-SCNC: 31.4 MMOL/L (ref 22–29)
CREAT SERPL-MCNC: 1.09 MG/DL (ref 0.57–1)
DEPRECATED RDW RBC AUTO: 52.7 FL (ref 37–54)
EGFRCR SERPLBLD CKD-EPI 2021: 54.8 ML/MIN/1.73
EOSINOPHIL # BLD AUTO: 0.5 10*3/MM3 (ref 0–0.4)
EOSINOPHIL NFR BLD AUTO: 2.9 % (ref 0.3–6.2)
ERYTHROCYTE [DISTWIDTH] IN BLOOD BY AUTOMATED COUNT: 16.8 % (ref 12.3–15.4)
GLOBULIN UR ELPH-MCNC: 2.2 GM/DL
GLUCOSE SERPL-MCNC: 96 MG/DL (ref 65–99)
HCT VFR BLD AUTO: 21.7 % (ref 34–46.6)
HCT VFR BLD AUTO: 21.8 % (ref 34–46.6)
HGB BLD-MCNC: 7 G/DL (ref 12–15.9)
HGB BLD-MCNC: 7.1 G/DL (ref 12–15.9)
IMM GRANULOCYTES # BLD AUTO: 0.08 10*3/MM3 (ref 0–0.05)
IMM GRANULOCYTES NFR BLD AUTO: 0.5 % (ref 0–0.5)
LYMPHOCYTES # BLD AUTO: 1.38 10*3/MM3 (ref 0.7–3.1)
LYMPHOCYTES NFR BLD AUTO: 8.1 % (ref 19.6–45.3)
MAGNESIUM SERPL-MCNC: 1.8 MG/DL (ref 1.6–2.4)
MCH RBC QN AUTO: 27.8 PG (ref 26.6–33)
MCHC RBC AUTO-ENTMCNC: 32.1 G/DL (ref 31.5–35.7)
MCV RBC AUTO: 86.5 FL (ref 79–97)
MONOCYTES # BLD AUTO: 0.44 10*3/MM3 (ref 0.1–0.9)
MONOCYTES NFR BLD AUTO: 2.6 % (ref 5–12)
NEUTROPHILS NFR BLD AUTO: 14.54 10*3/MM3 (ref 1.7–7)
NEUTROPHILS NFR BLD AUTO: 85.6 % (ref 42.7–76)
NRBC BLD AUTO-RTO: 0 /100 WBC (ref 0–0.2)
PLATELET # BLD AUTO: 202 10*3/MM3 (ref 140–450)
PMV BLD AUTO: 11 FL (ref 6–12)
POTASSIUM SERPL-SCNC: 3.2 MMOL/L (ref 3.5–5.2)
PROT SERPL-MCNC: 6 G/DL (ref 6–8.5)
RBC # BLD AUTO: 2.52 10*6/MM3 (ref 3.77–5.28)
RH BLD: POSITIVE
SODIUM SERPL-SCNC: 136 MMOL/L (ref 136–145)
T&S EXPIRATION DATE: NORMAL
WBC NRBC COR # BLD AUTO: 16.99 10*3/MM3 (ref 3.4–10.8)

## 2024-04-29 PROCEDURE — 94664 DEMO&/EVAL PT USE INHALER: CPT

## 2024-04-29 PROCEDURE — 36415 COLL VENOUS BLD VENIPUNCTURE: CPT

## 2024-04-29 PROCEDURE — 99285 EMERGENCY DEPT VISIT HI MDM: CPT

## 2024-04-29 PROCEDURE — P9016 RBC LEUKOCYTES REDUCED: HCPCS

## 2024-04-29 PROCEDURE — 94640 AIRWAY INHALATION TREATMENT: CPT

## 2024-04-29 PROCEDURE — 85025 COMPLETE CBC W/AUTO DIFF WBC: CPT | Performed by: PHYSICIAN ASSISTANT

## 2024-04-29 PROCEDURE — G0378 HOSPITAL OBSERVATION PER HR: HCPCS

## 2024-04-29 PROCEDURE — 86901 BLOOD TYPING SEROLOGIC RH(D): CPT

## 2024-04-29 PROCEDURE — 25010000002 CEFTRIAXONE PER 250 MG

## 2024-04-29 PROCEDURE — 86900 BLOOD TYPING SEROLOGIC ABO: CPT

## 2024-04-29 PROCEDURE — 36430 TRANSFUSION BLD/BLD COMPNT: CPT

## 2024-04-29 PROCEDURE — 85018 HEMOGLOBIN: CPT

## 2024-04-29 PROCEDURE — 94799 UNLISTED PULMONARY SVC/PX: CPT

## 2024-04-29 PROCEDURE — 86850 RBC ANTIBODY SCREEN: CPT

## 2024-04-29 PROCEDURE — 80053 COMPREHEN METABOLIC PANEL: CPT | Performed by: PHYSICIAN ASSISTANT

## 2024-04-29 PROCEDURE — 85014 HEMATOCRIT: CPT

## 2024-04-29 PROCEDURE — 83735 ASSAY OF MAGNESIUM: CPT | Performed by: PHYSICIAN ASSISTANT

## 2024-04-29 PROCEDURE — 86923 COMPATIBILITY TEST ELECTRIC: CPT

## 2024-04-29 RX ORDER — ALBUTEROL SULFATE 2.5 MG/3ML
2.5 SOLUTION RESPIRATORY (INHALATION) EVERY 6 HOURS PRN
Status: DISCONTINUED | OUTPATIENT
Start: 2024-04-29 | End: 2024-05-01 | Stop reason: HOSPADM

## 2024-04-29 RX ORDER — ROPINIROLE 2 MG/1
2 TABLET, FILM COATED ORAL NIGHTLY
Status: DISCONTINUED | OUTPATIENT
Start: 2024-04-29 | End: 2024-05-01 | Stop reason: HOSPADM

## 2024-04-29 RX ORDER — POTASSIUM CHLORIDE 750 MG/1
40 TABLET, FILM COATED, EXTENDED RELEASE ORAL EVERY 4 HOURS
Status: DISCONTINUED | OUTPATIENT
Start: 2024-04-29 | End: 2024-04-29

## 2024-04-29 RX ORDER — BISACODYL 5 MG/1
5 TABLET, DELAYED RELEASE ORAL DAILY PRN
Status: DISCONTINUED | OUTPATIENT
Start: 2024-04-29 | End: 2024-05-01 | Stop reason: HOSPADM

## 2024-04-29 RX ORDER — SPIRONOLACTONE 25 MG/1
25 TABLET ORAL DAILY
Status: DISCONTINUED | OUTPATIENT
Start: 2024-04-30 | End: 2024-05-01 | Stop reason: HOSPADM

## 2024-04-29 RX ORDER — IPRATROPIUM BROMIDE AND ALBUTEROL SULFATE 2.5; .5 MG/3ML; MG/3ML
3 SOLUTION RESPIRATORY (INHALATION) EVERY 4 HOURS PRN
Status: DISCONTINUED | OUTPATIENT
Start: 2024-04-29 | End: 2024-05-01 | Stop reason: HOSPADM

## 2024-04-29 RX ORDER — SODIUM CHLORIDE 0.9 % (FLUSH) 0.9 %
10 SYRINGE (ML) INJECTION EVERY 12 HOURS SCHEDULED
Status: DISCONTINUED | OUTPATIENT
Start: 2024-04-29 | End: 2024-05-01 | Stop reason: HOSPADM

## 2024-04-29 RX ORDER — BISACODYL 10 MG
10 SUPPOSITORY, RECTAL RECTAL DAILY PRN
Status: DISCONTINUED | OUTPATIENT
Start: 2024-04-29 | End: 2024-05-01 | Stop reason: HOSPADM

## 2024-04-29 RX ORDER — CYCLOBENZAPRINE HCL 10 MG
10 TABLET ORAL NIGHTLY
Status: DISCONTINUED | OUTPATIENT
Start: 2024-04-29 | End: 2024-05-01 | Stop reason: HOSPADM

## 2024-04-29 RX ORDER — HYDROCODONE BITARTRATE AND ACETAMINOPHEN 7.5; 325 MG/1; MG/1
1 TABLET ORAL EVERY 6 HOURS PRN
Status: DISCONTINUED | OUTPATIENT
Start: 2024-04-29 | End: 2024-05-01 | Stop reason: HOSPADM

## 2024-04-29 RX ORDER — LOPERAMIDE HYDROCHLORIDE 2 MG/1
2 CAPSULE ORAL 4 TIMES DAILY PRN
Status: DISCONTINUED | OUTPATIENT
Start: 2024-04-29 | End: 2024-05-01 | Stop reason: HOSPADM

## 2024-04-29 RX ORDER — NITROGLYCERIN 0.4 MG/1
0.4 TABLET SUBLINGUAL
Status: DISCONTINUED | OUTPATIENT
Start: 2024-04-29 | End: 2024-05-01 | Stop reason: HOSPADM

## 2024-04-29 RX ORDER — SODIUM CHLORIDE 9 MG/ML
40 INJECTION, SOLUTION INTRAVENOUS AS NEEDED
Status: DISCONTINUED | OUTPATIENT
Start: 2024-04-29 | End: 2024-05-01 | Stop reason: HOSPADM

## 2024-04-29 RX ORDER — ACETAMINOPHEN 325 MG/1
650 TABLET ORAL EVERY 4 HOURS PRN
Status: DISCONTINUED | OUTPATIENT
Start: 2024-04-29 | End: 2024-05-01 | Stop reason: HOSPADM

## 2024-04-29 RX ORDER — ROFLUMILAST 500 UG/1
500 TABLET ORAL DAILY
Status: DISCONTINUED | OUTPATIENT
Start: 2024-04-30 | End: 2024-05-01 | Stop reason: HOSPADM

## 2024-04-29 RX ORDER — BUDESONIDE 0.5 MG/2ML
0.5 INHALANT ORAL
Status: DISCONTINUED | OUTPATIENT
Start: 2024-04-29 | End: 2024-05-01 | Stop reason: HOSPADM

## 2024-04-29 RX ORDER — ARFORMOTEROL TARTRATE 15 UG/2ML
15 SOLUTION RESPIRATORY (INHALATION)
Status: DISCONTINUED | OUTPATIENT
Start: 2024-04-29 | End: 2024-05-01 | Stop reason: HOSPADM

## 2024-04-29 RX ORDER — ZOLPIDEM TARTRATE 5 MG/1
10 TABLET ORAL NIGHTLY PRN
Status: DISCONTINUED | OUTPATIENT
Start: 2024-04-29 | End: 2024-05-01 | Stop reason: HOSPADM

## 2024-04-29 RX ORDER — ONDANSETRON 2 MG/ML
4 INJECTION INTRAMUSCULAR; INTRAVENOUS EVERY 6 HOURS PRN
Status: DISCONTINUED | OUTPATIENT
Start: 2024-04-29 | End: 2024-05-01 | Stop reason: HOSPADM

## 2024-04-29 RX ORDER — DILTIAZEM HYDROCHLORIDE 60 MG/1
60 TABLET, FILM COATED ORAL EVERY 6 HOURS SCHEDULED
Status: DISCONTINUED | OUTPATIENT
Start: 2024-04-29 | End: 2024-05-01 | Stop reason: HOSPADM

## 2024-04-29 RX ORDER — PANTOPRAZOLE SODIUM 40 MG/1
40 TABLET, DELAYED RELEASE ORAL 2 TIMES DAILY
Status: DISCONTINUED | OUTPATIENT
Start: 2024-04-29 | End: 2024-05-01 | Stop reason: HOSPADM

## 2024-04-29 RX ORDER — ONDANSETRON 4 MG/1
4 TABLET, ORALLY DISINTEGRATING ORAL EVERY 6 HOURS PRN
Status: DISCONTINUED | OUTPATIENT
Start: 2024-04-29 | End: 2024-05-01 | Stop reason: HOSPADM

## 2024-04-29 RX ORDER — SODIUM CHLORIDE 0.9 % (FLUSH) 0.9 %
10 SYRINGE (ML) INJECTION AS NEEDED
Status: DISCONTINUED | OUTPATIENT
Start: 2024-04-29 | End: 2024-05-01 | Stop reason: HOSPADM

## 2024-04-29 RX ORDER — BUMETANIDE 2 MG/1
2 TABLET ORAL 2 TIMES DAILY
Status: DISCONTINUED | OUTPATIENT
Start: 2024-04-29 | End: 2024-04-30

## 2024-04-29 RX ORDER — POLYETHYLENE GLYCOL 3350 17 G/17G
17 POWDER, FOR SOLUTION ORAL DAILY PRN
Status: DISCONTINUED | OUTPATIENT
Start: 2024-04-29 | End: 2024-05-01 | Stop reason: HOSPADM

## 2024-04-29 RX ORDER — ATORVASTATIN CALCIUM 20 MG/1
40 TABLET, FILM COATED ORAL EVERY OTHER DAY
Status: DISCONTINUED | OUTPATIENT
Start: 2024-04-29 | End: 2024-05-01 | Stop reason: HOSPADM

## 2024-04-29 RX ORDER — AMOXICILLIN 250 MG
2 CAPSULE ORAL 2 TIMES DAILY PRN
Status: DISCONTINUED | OUTPATIENT
Start: 2024-04-29 | End: 2024-05-01 | Stop reason: HOSPADM

## 2024-04-29 RX ORDER — POTASSIUM CHLORIDE 750 MG/1
20 TABLET, FILM COATED, EXTENDED RELEASE ORAL 3 TIMES DAILY
Status: DISCONTINUED | OUTPATIENT
Start: 2024-04-29 | End: 2024-04-29

## 2024-04-29 RX ORDER — POTASSIUM CHLORIDE 750 MG/1
40 TABLET, FILM COATED, EXTENDED RELEASE ORAL EVERY 4 HOURS
Status: DISPENSED | OUTPATIENT
Start: 2024-04-29 | End: 2024-04-30

## 2024-04-29 RX ADMIN — IPRATROPIUM BROMIDE AND ALBUTEROL SULFATE 3 ML: .5; 3 SOLUTION RESPIRATORY (INHALATION) at 22:38

## 2024-04-29 RX ADMIN — CYCLOBENZAPRINE 10 MG: 10 TABLET, FILM COATED ORAL at 22:21

## 2024-04-29 RX ADMIN — CEFTRIAXONE 2000 MG: 2 INJECTION, POWDER, FOR SOLUTION INTRAMUSCULAR; INTRAVENOUS at 22:20

## 2024-04-29 RX ADMIN — Medication 10 ML: at 20:34

## 2024-04-29 RX ADMIN — BUMETANIDE 2 MG: 2 TABLET ORAL at 22:21

## 2024-04-29 RX ADMIN — ATORVASTATIN CALCIUM 40 MG: 20 TABLET, FILM COATED ORAL at 22:21

## 2024-04-29 RX ADMIN — ROPINIROLE 2 MG: 2 TABLET, FILM COATED ORAL at 22:21

## 2024-04-29 RX ADMIN — DILTIAZEM HYDROCHLORIDE 60 MG: 60 TABLET, FILM COATED ORAL at 22:21

## 2024-04-29 RX ADMIN — BUDESONIDE 0.5 MG: 0.5 INHALANT ORAL at 22:38

## 2024-04-29 RX ADMIN — POTASSIUM CHLORIDE 40 MEQ: 750 TABLET, EXTENDED RELEASE ORAL at 21:03

## 2024-04-29 RX ADMIN — PANTOPRAZOLE SODIUM 40 MG: 40 TABLET, DELAYED RELEASE ORAL at 22:21

## 2024-04-29 NOTE — H&P
[facility]   HISTORY AND PHYSICAL    Patient Name: Paz Browne  : 1955  MRN: [id number]  Primary Care Physician:  Javan Martinez MD  Date of admission: 2024    Subjective   Subjective     Chief Complaint:   Chief Complaint   Patient presents with    Nose Bleed         HPI:    Paz Browne is a 68 y.o. female, with a past medical history including, but not limited to, chronic kidney disease, atrial fibrillation with long-term use of coagulants, congestive heart failure, COPD, and recent hospitalization with pneumonia, presented to the emergency department with a complaint of nosebleed.  Patient states that she was sitting in her chair today and her nose began bleeding and she could not get it to stop.  By the time she got to the emergency department the bleeding did stop.  She states that she has also been coughing up blood over the past 3 days.  She was recently admitted on 2024 with sepsis and had hemoptysis at that time.  However she was off of the Eliquis last week secondary to having an EGD and colonoscopy.  During that time she did not have hemoptysis.  She restarted the Eliquis on 2024 and the hemoptysis restarted on approximately the .  She has not had a nosebleed prior to today and there is no bleeding noted at this time.  Cardiology has been consulted regarding her anticoagulation with hemoptysis and epistaxis.  We will transfuse her 1 unit PRBCs tonight and reevaluate her lab work in the a.m.    Review of Systems   All systems were reviewed and negative except for: What was mentioned above in the HPI.    Personal History     Past Medical History:   Diagnosis Date    VAN (acute kidney injury)     Anemia     Asthma     Atrial flutter     cardioversion    Cataract     Celiac artery stenosis     Chronic respiratory failure with hypoxia     Colon polyp     COPD (chronic obstructive pulmonary disease)     GI bleed     Hiatal hernia     History of CHF (congestive heart  failure)     due to MR    History of home oxygen therapy     3 lpm NC    History of mitral valve replacement with tissue graft     Hyperlipidemia     Hypertension     Infectious viral hepatitis     B    Intertrigo     Long term (current) use of anticoagulants     Mitral regurgitation     s/p tissue MVR    PAF (paroxysmal atrial fibrillation)     s/p MAZE    Pneumonia     Pulmonary hypertension     S/P TVR (tricuspid valve repair) 7/7/2016       Past Surgical History:   Procedure Laterality Date    CARDIAC CATHETERIZATION  09/01/2014    Right dominant systemt, normal coronary arteries.     CARDIAC CATHETERIZATION Left 6/10/2016    Procedure: Cardiac catheterization;  Surgeon: Sergei Hall MD;  Location: [facility] CATH INVASIVE LOCATION;  Service:     CARDIAC CATHETERIZATION N/A 6/10/2016    Procedure: Right Heart Cath;  Surgeon: Sergei Hall MD;  Location: [facility] CATH INVASIVE LOCATION;  Service:     CATARACT EXTRACTION      COLONOSCOPY      COLONOSCOPY N/A 8/4/2017    Procedure: COLONOSCOPY TO CECUM/TI WITH POLYPECTOMY ( COLD BX);  Surgeon: Cleveland Devine MD;  Location: [facility] ENDOSCOPY;  Service:     COLONOSCOPY N/A 8/10/2017    Procedure: COLONOSCOPY to cecum and TI with 2 clips placed at transverse;  Surgeon: Earnest PALOMO MD;  Location: [facility] ENDOSCOPY;  Service:     COLONOSCOPY N/A 12/22/2017    Procedure: COLONOSCOPY INTO CECUM WITH COLD POLYPECTOMIES;  Surgeon: Cleveland Devine MD;  Location: [facility] ENDOSCOPY;  Service:     COLONOSCOPY N/A 5/17/2021    Procedure: COLONOSCOPY to cecum;  Surgeon: Cleveland Devine MD;  Location: [facility] ENDOSCOPY;  Service: Gastroenterology;  Laterality: N/A;  Pre: Fe deficency anemia, h/x of polyps  Post: fair prep, normal    COLONOSCOPY W/ POLYPECTOMY N/A 4/20/2024    Procedure: COLONOSCOPY to cecum with cold and hot snare polypectomies with saline lift and APC;  Surgeon: David Payne MD;  Location: [facility] ENDOSCOPY;  Service: Gastroenterology;  Laterality: N/A;  pre-  anemia  post- adequate prep, polyps, avm    ENDOSCOPY N/A 8/17/2017    Procedure: ESOPHAGOGASTRODUODENOSCOPY;  Surgeon: Porsha Ruby MD;  Location: [facility] ENDOSCOPY;  Service:     ENDOSCOPY N/A 12/22/2017    Procedure: ESOPHAGOGASTRODUODENOSCOPY WITH BIOPSIES;  Surgeon: Cleveland Devine MD;  Location: [facility] ENDOSCOPY;  Service:     ENDOSCOPY N/A 5/17/2021    Procedure: ESOPHAGOGASTRODUODENOSCOPY  with biopsies;  Surgeon: Cleveland Devine MD;  Location: [facility] ENDOSCOPY;  Service: Gastroenterology;  Laterality: N/A;  Pre: Fe deficency anemia, nausea, heme positive stool   Post: gastritis, sloughing of distal esophagus mucosa    ENDOSCOPY N/A 4/19/2024    Procedure: ESOPHAGOGASTRODUODENOSCOPY WITH BIOPSY;  Surgeon: Rick Morales MD;  Location: [facility] ENDOSCOPY;  Service: Gastroenterology;  Laterality: N/A;  PRE: ANEMIA /  POST: ESOPHAGITIS    GALLBLADDER SURGERY      HEMORRHOIDECTOMY      HYSTERECTOMY      KIDNEY SURGERY  04/22/2013    Stent placement    MAZE PROCEDURE      MITRAL VALVE REPLACEMENT      TONSILLECTOMY      TRICUSPID VALVE REPLACEMENT         Family History: family history includes No Known Problems in her father and mother. She was adopted. Otherwise pertinent FHx was reviewed and not pertinent to current issue.    Social History:  reports that she quit smoking about 17 years ago. Her smoking use included cigarettes. She started smoking about 71 years ago. She has a 162 pack-year smoking history. She has been exposed to tobacco smoke. She has never used smokeless tobacco. She reports that she does not drink alcohol and does not use drugs.    Home Medications:  HYDROcodone-acetaminophen, Nebulizer, O2, Umeclidinium-Vilanterol, albuterol sulfate HFA, ampicillin 12 g in sodium chloride 0.9 % 500 mL, apixaban, atorvastatin, budesonide, bumetanide, (cefTRIAXone 2,000 mg in sodium chloride 0.9 % 100 mL IVPB), cyclobenzaprine, dilTIAZem, docosanol, ipratropium-albuterol, loperamide, loratadine,  multivitamin with minerals, ondansetron ODT, pantoprazole, potassium chloride, rOPINIRole, roflumilast, sertraline, spironolactone, and zolpidem    Allergies:  Allergies   Allergen Reactions    Bupropion Itching    Cephalexin Itching     Tolerated piperacillin/tazobactam, ampicillin, cefdinir, and ceftriaxone    Metoprolol Itching       Objective   Objective     Vitals:   Temp:  [97.2 °F (36.2 °C)] 97.2 °F (36.2 °C)  Heart Rate:  [92] 92  Resp:  [22] 22  BP: (157)/(96) 157/96  Flow (L/min):  [4] 4  Physical Exam    Constitutional: Awake, alert   Eyes: PERRLA   HENT: NCAT, mucous membranes moist   Neck: Supple,trachea midline   Respiratory: Decreased  bilaterally, nonlabored respirations    Cardiovascular: Heart rate regular, palpable pedal pulses bilaterally   Gastrointestinal: Positive bowel sounds, soft, nontender, nondistended   Musculoskeletal: No bilateral ankle edema,    Psychiatric: Appropriate affect, cooperative   Neurologic: Oriented x 3, speech clear   Skin: No rashes     Result Review    Result Review:  I have personally reviewed the results from the time of this admission to 4/29/2024 19:29 EDT and agree with these findings:  [x]  Laboratory list / accordion  []  Microbiology  []  Radiology  []  EKG/Telemetry   []  Cardiology/Vascular   []  Pathology  [x]  Old records  []  Other:  Initial workup in the emergency department shows potassium 3.2, chloride 91,  Creatinine 1.09, GFR 54.9, WBCs 16.99, RBCs 2.52, hemoglobin 7.0, hematocrit 21.8, all other lab work is baseline for the patient.  Repeat hemoglobin was 7.1 with hematocrit of 21.7.      Assessment & Plan   Assessment / Plan     Brief Patient Summary:  Paz Browne is a 68 y.o. female who was admitted to the observation unit for further evaluation and treatment of her nosebleed and anemia.    Active Hospital Problems:  Active Hospital Problems    Diagnosis     **Epistaxis      Plan:   Epistaxis/anemia  -Cardiac monitoring  -Vital signs every 4  hours  -Continuous pulse ox  -Type and screen  -H&H at midnight  -Repeat labs in a.m.  -Transfuse 1 unit RBCs tonight    Atrial fibrillation  -Continue Cardizem  -Hold Eliquis until seen by cardiology  -Cardiology consult in a.m.      COPD  -Continue home O2  -Continue home inhalers      DVT prophylaxis:  Mechanical DVT prophylaxis orders are present.        CODE STATUS:    Code Status (Patient has no pulse and is not breathing): CPR (Attempt to Resuscitate)  Medical Interventions (Patient has pulse or is breathing): Full Support    Admission Status:  I believe this patient meets observation status.    80 minutes have been spent by [facility] providers in the care of this patient while under observation status.      Appropriate PPE worn during patient encounter.  Hand hygeine performed before and after seeing the patient.      Electronically signed by BA Aleman, 04/29/24, 7:29 PM EDT.

## 2024-04-29 NOTE — ED PROVIDER NOTES
EMERGENCY DEPARTMENT ENCOUNTER      PCP: Javan Martinez MD  Patient Care Team:  Javan Martinez MD as PCP - General (Internal Medicine)  Ag Melo Jr., MD as Consulting Physician (Pulmonary Disease)  Cleveland Devine MD as Consulting Physician (Gastroenterology)  Earnest Gan MD as Consulting Physician (Cardiology)  Daniela Shin MD PhD as Consulting Physician (Hematology and Oncology)   Independent Historians: Patient, family at bedside    HPI:  Chief Complaint: Epistaxis  A complete HPI/ROS/PMH/PSH/SH/FH are unobtainable due to: None    Chronic or social conditions impacting patient care (social determinants of health): None    Context: Paz Browne is a 68 y.o. female with history of atrial fibrillation on Eliquis, chronic respiratory failure on supplemental oxygen who presents to the ED c/o acute epistaxis.  Patient has had intermittent episodes of bilateral epistaxis today.  She states she was finally able to control bleeding right before she arrived to the ER.  She is chronically on supplemental oxygen.  Does admit to blowing her nose and having bleeding occur shortly after.  Was recently restarted on Eliquis due to atrial fibrillation.  She has previously been on Coumadin in the past but experienced GI bleeding.    Review of prior external notes and/or external test results outside of this encounter: CBC on 4/26/2024 showed white blood cell count of 16.09, hemoglobin 8.2, platelets 193.      PAST MEDICAL HISTORY  Active Ambulatory Problems     Diagnosis Date Noted    PAF (paroxysmal atrial fibrillation) 01/25/2016    Hypertension     Hyperlipidemia     Pain medication agreement 05/04/2016    Hiatal hernia 05/04/2016    Primary osteoarthritis involving multiple joints 05/04/2016    Pulmonary hypertension     S/P TVR (tricuspid valve repair) 07/07/2016    Pulmonary nodule 10/13/2016    Restless leg syndrome 11/18/2016    Chronic low back pain 08/02/2017    Dysplastic polyp of colon  08/07/2017    Lower GI bleed 08/09/2017    History of mitral valve replacement with tissue graft 08/11/2017    Chronic hypoxemic respiratory failure 08/13/2017    Anemia 08/09/2017    Celiac artery stenosis 08/24/2017    Intertrigo 08/25/2017    Abnormal EKG 06/30/2019    Muscle spasms of both lower extremities 12/22/2020    Iron deficiency anemia 03/30/2021    Adenomatous polyp of colon 03/30/2021    Gastroesophageal reflux disease without esophagitis 03/30/2021    Headache 07/04/2021    History of endocarditis 02/03/2022    Pneumonia of both lower lobes due to infectious organism 10/16/2022    Peptic ulcer disease 10/17/2022    Peripheral vascular disease 10/17/2022    Hyperlipidemia 10/17/2022    Hyperglycemia 10/17/2022    COPD with acute exacerbation 10/19/2022    Chronic diastolic CHF (congestive heart failure) 03/29/2023    Chronic bilateral low back pain 04/19/2023    COPD exacerbation 11/17/2023    Leukocytosis 03/02/2024    Elevated troponin 03/02/2024    Pneumonia 04/01/2024    Acute-on-chronic respiratory failure 04/01/2024    Septicemia due to enterococcus 04/02/2024    Acute on chronic diastolic congestive heart failure 04/05/2024    Sepsis 04/11/2024    COPD (chronic obstructive pulmonary disease) 04/12/2024    Esophagitis 04/20/2024    Acute heart failure with preserved ejection fraction (HFpEF) 04/21/2024     Resolved Ambulatory Problems     Diagnosis Date Noted    Tachycardia     Renal failure     Palpitations     Mitral regurgitation     Heart failure 06/08/2016    Long term current use of anticoagulant 10/13/2016    Aspiration pneumonia 10/14/2016    Hemoptysis 10/14/2016    Precordial pain 08/11/2017    Oral candidiasis 08/14/2017    VAN (acute kidney injury) 08/15/2017    GI bleed 12/01/2017    Acute exacerbation of chronic obstructive pulmonary disease (COPD) 01/02/2018    Pneumonia due to infectious organism 07/18/2018    Insomnia due to medical condition 02/01/2019    COPD (chronic  obstructive pulmonary disease) 06/30/2019    Shingles 04/08/2020    Vertigo 10/06/2020    Dark stools 03/30/2021    Acute hyperkalemia 05/10/2021    Tremor 05/10/2021    Anemia 05/10/2021    COPD exacerbation 06/14/2021    Septicemia 07/02/2021    Bacteremia 07/03/2021    Acute hypoxemic respiratory failure 10/17/2022    COPD (chronic obstructive pulmonary disease) 10/17/2022    Pulmonary nodule 10/17/2022    Hypokalemia 10/17/2022    Chronic respiratory failure 10/19/2022     Past Medical History:   Diagnosis Date    Asthma     Atrial flutter     Cataract     Chronic respiratory failure with hypoxia     Colon polyp     History of CHF (congestive heart failure)     History of home oxygen therapy     Infectious viral hepatitis     Long term (current) use of anticoagulants        The patient has started, but not completed, their COVID-19 vaccination series.    PAST SURGICAL HISTORY  Past Surgical History:   Procedure Laterality Date    CARDIAC CATHETERIZATION  09/01/2014    Right dominant systemt, normal coronary arteries.     CARDIAC CATHETERIZATION Left 6/10/2016    Procedure: Cardiac catheterization;  Surgeon: Sergei Hall MD;  Location: [facility] CATH INVASIVE LOCATION;  Service:     CARDIAC CATHETERIZATION N/A 6/10/2016    Procedure: Right Heart Cath;  Surgeon: Sergei Hall MD;  Location: [facility] CATH INVASIVE LOCATION;  Service:     CATARACT EXTRACTION      COLONOSCOPY      COLONOSCOPY N/A 8/4/2017    Procedure: COLONOSCOPY TO CECUM/TI WITH POLYPECTOMY ( COLD BX);  Surgeon: Cleveland Devine MD;  Location: [facility] ENDOSCOPY;  Service:     COLONOSCOPY N/A 8/10/2017    Procedure: COLONOSCOPY to cecum and TI with 2 clips placed at transverse;  Surgeon: Earnest PALOMO MD;  Location: [facility] ENDOSCOPY;  Service:     COLONOSCOPY N/A 12/22/2017    Procedure: COLONOSCOPY INTO CECUM WITH COLD POLYPECTOMIES;  Surgeon: Cleveland Devine MD;  Location: [facility] ENDOSCOPY;  Service:     COLONOSCOPY N/A 5/17/2021    Procedure: COLONOSCOPY to  cecum;  Surgeon: Cleveland Devine MD;  Location: [facility] ENDOSCOPY;  Service: Gastroenterology;  Laterality: N/A;  Pre: Fe deficency anemia, h/x of polyps  Post: fair prep, normal    COLONOSCOPY W/ POLYPECTOMY N/A 4/20/2024    Procedure: COLONOSCOPY to cecum with cold and hot snare polypectomies with saline lift and APC;  Surgeon: David Payne MD;  Location: [facility] ENDOSCOPY;  Service: Gastroenterology;  Laterality: N/A;  pre- anemia  post- adequate prep, polyps, avm    ENDOSCOPY N/A 8/17/2017    Procedure: ESOPHAGOGASTRODUODENOSCOPY;  Surgeon: Porsha Ruby MD;  Location: [facility] ENDOSCOPY;  Service:     ENDOSCOPY N/A 12/22/2017    Procedure: ESOPHAGOGASTRODUODENOSCOPY WITH BIOPSIES;  Surgeon: Cleveland Devine MD;  Location: [facility] ENDOSCOPY;  Service:     ENDOSCOPY N/A 5/17/2021    Procedure: ESOPHAGOGASTRODUODENOSCOPY  with biopsies;  Surgeon: Cleveland Devine MD;  Location: [facility] ENDOSCOPY;  Service: Gastroenterology;  Laterality: N/A;  Pre: Fe deficency anemia, nausea, heme positive stool   Post: gastritis, sloughing of distal esophagus mucosa    ENDOSCOPY N/A 4/19/2024    Procedure: ESOPHAGOGASTRODUODENOSCOPY WITH BIOPSY;  Surgeon: Rick Morales MD;  Location: [facility] ENDOSCOPY;  Service: Gastroenterology;  Laterality: N/A;  PRE: ANEMIA /  POST: ESOPHAGITIS    GALLBLADDER SURGERY      HEMORRHOIDECTOMY      HYSTERECTOMY      KIDNEY SURGERY  04/22/2013    Stent placement    MAZE PROCEDURE      MITRAL VALVE REPLACEMENT      TONSILLECTOMY      TRICUSPID VALVE REPLACEMENT           FAMILY HISTORY  Family History   Adopted: Yes   Problem Relation Age of Onset    No Known Problems Mother     No Known Problems Father          SOCIAL HISTORY  Social History     Socioeconomic History    Marital status:     Number of children: 2   Tobacco Use    Smoking status: Former     Current packs/day: 0.00     Average packs/day: 3.0 packs/day for 54.0 years (162.0 ttl pk-yrs)     Types: Cigarettes     Start date: 1953      Quit date:      Years since quittin.3     Passive exposure: Past    Smokeless tobacco: Never    Tobacco comments:     caffeine - tea or mt dew    Vaping Use    Vaping status: Never Used   Substance and Sexual Activity    Alcohol use: No    Drug use: No    Sexual activity: Defer         ALLERGIES  Bupropion, Cephalexin, and Metoprolol        REVIEW OF SYSTEMS  Review of Systems   HENT:  Positive for nosebleeds.    Respiratory:  Negative for shortness of breath.    Neurological:  Negative for syncope and light-headedness.        All systems reviewed and negative except for those discussed in HPI.       PHYSICAL EXAM    I have reviewed the triage vital signs and nursing notes.    ED Triage Vitals   Temp Heart Rate Resp BP SpO2   24 1710 24 1710 24 1710 24 1714 24 1713   97.2 °F (36.2 °C) 92 22 157/96 90 %      Temp src Heart Rate Source Patient Position BP Location FiO2 (%)   -- -- -- -- --              Physical Exam  GENERAL: alert, chronically ill-appearing, no acute distress  SKIN: Warm, dry  HENT: Normocephalic, atraumatic, no active bleeding, dried blood to bilateral naris, no active bleeding noted in the posterior oropharynx  EYES: no scleral icterus  CV: regular rhythm, regular rate  RESPIRATORY: Diminished, on chronic supplemental O2  ABDOMEN: soft, nontender, nondistended  MUSCULOSKELETAL: no deformity  NEURO: alert, moves all extremities, follows commands          LAB RESULTS  Recent Results (from the past 24 hour(s))   Comprehensive Metabolic Panel    Collection Time: 24  6:05 PM    Specimen: Blood   Result Value Ref Range    Glucose 96 65 - 99 mg/dL    BUN 11 8 - 23 mg/dL    Creatinine 1.09 (H) 0.57 - 1.00 mg/dL    Sodium 136 136 - 145 mmol/L    Potassium 3.2 (L) 3.5 - 5.2 mmol/L    Chloride 91 (L) 98 - 107 mmol/L    CO2 31.4 (H) 22.0 - 29.0 mmol/L    Calcium 8.7 8.6 - 10.5 mg/dL    Total Protein 6.0 6.0 - 8.5 g/dL    Albumin 3.8 3.5 - 5.2 g/dL    ALT (SGPT)  19 1 - 33 U/L    AST (SGOT) 24 1 - 32 U/L    Alkaline Phosphatase 74 39 - 117 U/L    Total Bilirubin 0.7 0.0 - 1.2 mg/dL    Globulin 2.2 gm/dL    A/G Ratio 1.7 g/dL    BUN/Creatinine Ratio 10.1 7.0 - 25.0    Anion Gap 13.6 5.0 - 15.0 mmol/L    eGFR 54.8 (L) >60.0 mL/min/1.73   Magnesium    Collection Time: 04/29/24  6:05 PM    Specimen: Blood   Result Value Ref Range    Magnesium 1.8 1.6 - 2.4 mg/dL   CBC Auto Differential    Collection Time: 04/29/24  6:05 PM    Specimen: Blood   Result Value Ref Range    WBC 16.99 (H) 3.40 - 10.80 10*3/mm3    RBC 2.52 (L) 3.77 - 5.28 10*6/mm3    Hemoglobin 7.0 (L) 12.0 - 15.9 g/dL    Hematocrit 21.8 (L) 34.0 - 46.6 %    MCV 86.5 79.0 - 97.0 fL    MCH 27.8 26.6 - 33.0 pg    MCHC 32.1 31.5 - 35.7 g/dL    RDW 16.8 (H) 12.3 - 15.4 %    RDW-SD 52.7 37.0 - 54.0 fl    MPV 11.0 6.0 - 12.0 fL    Platelets 202 140 - 450 10*3/mm3    Neutrophil % 85.6 (H) 42.7 - 76.0 %    Lymphocyte % 8.1 (L) 19.6 - 45.3 %    Monocyte % 2.6 (L) 5.0 - 12.0 %    Eosinophil % 2.9 0.3 - 6.2 %    Basophil % 0.3 0.0 - 1.5 %    Immature Grans % 0.5 0.0 - 0.5 %    Neutrophils, Absolute 14.54 (H) 1.70 - 7.00 10*3/mm3    Lymphocytes, Absolute 1.38 0.70 - 3.10 10*3/mm3    Monocytes, Absolute 0.44 0.10 - 0.90 10*3/mm3    Eosinophils, Absolute 0.50 (H) 0.00 - 0.40 10*3/mm3    Basophils, Absolute 0.05 0.00 - 0.20 10*3/mm3    Immature Grans, Absolute 0.08 (H) 0.00 - 0.05 10*3/mm3    nRBC 0.0 0.0 - 0.2 /100 WBC       Ordered the above labs and independently reviewed and interpreted the results.        RADIOLOGY  No Radiology Exams Resulted Within Past 24 Hours    I ordered the above noted radiological studies. Independently reviewed and interpreted by me.  See dictation for official radiology interpretation.      PROCEDURES    Procedures      MEDICATIONS GIVEN IN ER    Medications   sodium chloride 0.9 % flush 10 mL (has no administration in time range)   sodium chloride 0.9 % flush 10 mL (has no administration in time  range)   sodium chloride 0.9 % infusion 40 mL (has no administration in time range)   ondansetron ODT (ZOFRAN-ODT) disintegrating tablet 4 mg (has no administration in time range)     Or   ondansetron (ZOFRAN) injection 4 mg (has no administration in time range)   nitroglycerin (NITROSTAT) SL tablet 0.4 mg (has no administration in time range)   Potassium Replacement - Follow Nurse / BPA Driven Protocol (has no administration in time range)   Magnesium Standard Dose Replacement - Follow Nurse / BPA Driven Protocol (has no administration in time range)   Phosphorus Replacement - Follow Nurse / BPA Driven Protocol (has no administration in time range)   Calcium Replacement - Follow Nurse / BPA Driven Protocol (has no administration in time range)   acetaminophen (TYLENOL) tablet 650 mg (has no administration in time range)   sennosides-docusate (PERICOLACE) 8.6-50 MG per tablet 2 tablet (has no administration in time range)     And   polyethylene glycol (MIRALAX) packet 17 g (has no administration in time range)     And   bisacodyl (DULCOLAX) EC tablet 5 mg (has no administration in time range)     And   bisacodyl (DULCOLAX) suppository 10 mg (has no administration in time range)   potassium chloride (K-DUR,KLOR-CON) ER tablet 40 mEq (has no administration in time range)         PROGRESS, DATA ANALYSIS, CONSULTS, AND MEDICAL DECISION MAKING    All labs have been independently reviewed and interpreted by me.  All radiology studies have been independently reviewed and interpreted by me and discussed with radiologist dictating the report.   EKG's independently reviewed and interpreted by me.  Discussion below represents my analysis of pertinent findings related to patient's condition, differential diagnosis, treatment plan and final disposition.      ED Course as of 04/29/24 2020 Mon Apr 29, 2024 1909 WBC(!): 16.99  No significant change from 3 days ago [DC]   1910 Hemoglobin(!): 7.0  8.2 three days ago [DC]   1925  Discussed case with CARON Lay, who will admit the patient to observation unit. [DC]   1936 Patient has hemoglobin of 7.0.  Given the borderline need for transfusion although she is not actively bleeding combined with the pain that she lives out in [facility], think admission overnight would be the most prudent in order to ensure the patient's safety. [TD]      ED Course User Index  [DC] Irina Cook PA  [TD] Sergei Campbell II, MD             AS OF 20:20 EDT VITALS:    BP - 161/85  HR - 86  TEMP - 98.6 °F (37 °C) (Oral)  O2 SATS - 95%        DIAGNOSIS  Final diagnoses:   Epistaxis   Chronic hypoxic respiratory failure, on home oxygen therapy   Anticoagulated   Anemia, unspecified type         DISPOSITION  ED Disposition       ED Disposition   Decision to Admit    Condition   --    Comment   --                  Note Disclaimer: At [facility], we believe that sharing information builds trust and better relationships. You are receiving this note because you recently visited [facility]. It is possible you will see health information before a provider has talked with you about it. This kind of information can be easy to misunderstand. To help you fully understand what it means for your health, we urge you to discuss this note with your provider.         Irina Cook PA  04/29/24 2020

## 2024-04-29 NOTE — ED NOTES
Patient to ER via car from home for nose bleed x today    Patient is on 2-3 L nc at all times  Patient is on blood thinners    Patient has no bleeding at time of triage

## 2024-04-29 NOTE — ED PROVIDER NOTES
MD ATTESTATION NOTE    SHARED VISIT: This visit was performed by BOTH a physician and an APC. The substantive portion of the medical decision making was performed by this attesting physician who made or approved the management plan and takes responsibility for patient management. All studies documented in the APC note (if performed) were independently interpreted by me.    The MILAN and I have discussed this patient's history, physical exam, MDM, and treatment plan.  I have reviewed the documentation and personally had a face to face interaction with the patient. The attached note describes my personal findings.      Paz Browne is a 68 y.o. female who presents to the ED c/o acute intermittent nosebleeds today.  She is normally on nasal cannula.    On exam:  GENERAL: not distressed  HENT: Dried blood in the nares, no active bleeding  EYES: no scleral icterus  CV: regular rhythm, regular rate  RESPIRATORY: normal effort  MUSCULOSKELETAL: no deformity  NEURO: alert, moves all extremities, follows commands  SKIN: warm, dry    Labs  No results found for this or any previous visit (from the past 24 hour(s)).    Radiology  No Radiology Exams Resulted Within Past 24 Hours    Medications given in the ED:  Medications - No data to display    Orders placed during this visit:  Orders Placed This Encounter   Procedures    Comprehensive Metabolic Panel    Magnesium    CBC Auto Differential    CBC & Differential       Medical Decision Making:  ED Course as of 04/30/24 2214 Mon Apr 29, 2024 1909 WBC(!): 16.99  No significant change from 3 days ago [DC]   1910 Hemoglobin(!): 7.0  8.2 three days ago [DC]   1925 Discussed case with CARON Lay, who will admit the patient to observation unit. [DC]   1936 Patient has hemoglobin of 7.0.  Given the borderline need for transfusion although she is not actively bleeding combined with the pain that she [facility], think admission overnight would be the  most prudent in order to ensure the patient's safety. [TD]      ED Course User Index  [DC] Irina Cook PA  [TD] Sergei Campbell II, MD         Diagnosis  Final diagnoses:   Epistaxis   Chronic hypoxic respiratory failure, on home oxygen therapy   Anticoagulated   Anemia, unspecified type          Sergei Campbell II, MD  04/30/24 [id number]

## 2024-04-29 NOTE — ED NOTES
Nursing report ED to floor  Paz Browne  68 y.o.  female    HPI :  HPI (Adult)  Stated Reason for Visit: nosebleed    Chief Complaint  Chief Complaint   Patient presents with    Nose Bleed       Admitting doctor:   Norris Razo MD    Admitting diagnosis:   The primary encounter diagnosis was Epistaxis. Diagnoses of Chronic hypoxic respiratory failure, on home oxygen therapy, Anticoagulated, and Anemia, unspecified type were also pertinent to this visit.    Code status:   Current Code Status       Date Active Code Status Order ID Comments User Context       4/29/2024 1927 CPR (Attempt to Resuscitate) [id number]  Kassidy Casey, BA ED        Question Answer    Code Status (Patient has no pulse and is not breathing) CPR (Attempt to Resuscitate)    Medical Interventions (Patient has pulse or is breathing) Full Support                    Allergies:   Bupropion, Cephalexin, and Metoprolol    Isolation:   No active isolations    Intake and Output  No intake or output data in the 24 hours ending 04/29/24 1929    Weight:   There were no vitals filed for this visit.    Most recent vitals:   Vitals:    04/29/24 1710 04/29/24 1713 04/29/24 1714   BP:   157/96   Pulse: 92     Resp: 22     Temp: 97.2 °F (36.2 °C)     SpO2:  90%        Active LDAs/IV Access:   Lines, Drains & Airways       Active LDAs       Name Placement date Placement time Site Days    PICC Double Lumen 04/05/24 Left Basilic 04/05/24  1800  Basilic  24                    Labs (abnormal labs have a star):   Labs Reviewed   COMPREHENSIVE METABOLIC PANEL - Abnormal; Notable for the following components:       Result Value    Creatinine 1.09 (*)     Potassium 3.2 (*)     Chloride 91 (*)     CO2 31.4 (*)     eGFR 54.8 (*)     All other components within normal limits    Narrative:     GFR Normal >60  Chronic Kidney Disease <60  Kidney Failure <15     CBC WITH AUTO DIFFERENTIAL - Abnormal; Notable for the following components:    WBC 16.99 (*)     RBC 2.52  (*)     Hemoglobin 7.0 (*)     Hematocrit 21.8 (*)     RDW 16.8 (*)     Neutrophil % 85.6 (*)     Lymphocyte % 8.1 (*)     Monocyte % 2.6 (*)     Neutrophils, Absolute 14.54 (*)     Eosinophils, Absolute 0.50 (*)     Immature Grans, Absolute 0.08 (*)     All other components within normal limits   MAGNESIUM - Normal   HEMOGLOBIN AND HEMATOCRIT, BLOOD   CBC AND DIFFERENTIAL    Narrative:     The following orders were created for panel order CBC & Differential.  Procedure                               Abnormality         Status                     ---------                               -----------         ------                     CBC Auto Differential[[id number]]        Abnormal            Final result                 Please view results for these tests on the individual orders.       EKG:   No orders to display       Meds given in ED:   Medications   sodium chloride 0.9 % flush 10 mL (has no administration in time range)   sodium chloride 0.9 % flush 10 mL (has no administration in time range)   sodium chloride 0.9 % infusion 40 mL (has no administration in time range)   ondansetron ODT (ZOFRAN-ODT) disintegrating tablet 4 mg (has no administration in time range)     Or   ondansetron (ZOFRAN) injection 4 mg (has no administration in time range)   nitroglycerin (NITROSTAT) SL tablet 0.4 mg (has no administration in time range)   Potassium Replacement - Follow Nurse / BPA Driven Protocol (has no administration in time range)   Magnesium Standard Dose Replacement - Follow Nurse / BPA Driven Protocol (has no administration in time range)   Phosphorus Replacement - Follow Nurse / BPA Driven Protocol (has no administration in time range)   Calcium Replacement - Follow Nurse / BPA Driven Protocol (has no administration in time range)   acetaminophen (TYLENOL) tablet 650 mg (has no administration in time range)   sennosides-docusate (PERICOLACE) 8.6-50 MG per tablet 2 tablet (has no administration in time range)     And    polyethylene glycol (MIRALAX) packet 17 g (has no administration in time range)     And   bisacodyl (DULCOLAX) EC tablet 5 mg (has no administration in time range)     And   bisacodyl (DULCOLAX) suppository 10 mg (has no administration in time range)       Imaging results:  No radiology results for the last day    Ambulatory status:   - ad bob      Social issues:   Social History     Socioeconomic History    Marital status:     Number of children: 2   Tobacco Use    Smoking status: Former     Current packs/day: 0.00     Average packs/day: 3.0 packs/day for 54.0 years (162.0 ttl pk-yrs)     Types: Cigarettes     Start date:      Quit date:      Years since quittin.3     Passive exposure: Past    Smokeless tobacco: Never    Tobacco comments:     caffeine - tea or mt dew    Vaping Use    Vaping status: Never Used   Substance and Sexual Activity    Alcohol use: No    Drug use: No    Sexual activity: Defer       Peripheral Neurovascular  Peripheral Neurovascular (Adult)  Peripheral Neurovascular WDL: WDL    Neuro Cognitive  Neuro Cognitive (Adult)  Cognitive/Neuro/Behavioral WDL: WDL    Learning  Learning Assessment (Adult)  Learning Readiness and Ability: no barriers identified    Respiratory  Respiratory (Adult)  Airway WDL: WDL  Respiratory WDL  Respiratory WDL: .WDL except, rhythm/pattern  Rhythm/Pattern, Respiratory: shortness of breath (at baseline)    Abdominal Pain       Pain Assessments  Pain (Adult)  (0-10) Pain Rating: Rest: 0    NIH Stroke Scale       Sulema Chirinos RN  24 19:29 EDT

## 2024-04-30 ENCOUNTER — READMISSION MANAGEMENT (OUTPATIENT)
Dept: CALL CENTER | Facility: HOSPITAL | Age: 69
End: 2024-04-30
Payer: MEDICARE

## 2024-04-30 LAB
ANION GAP SERPL CALCULATED.3IONS-SCNC: 10.9 MMOL/L (ref 5–15)
BH BB BLOOD EXPIRATION DATE: NORMAL
BH BB BLOOD TYPE BARCODE: 6200
BH BB DISPENSE STATUS: NORMAL
BH BB PRODUCT CODE: NORMAL
BH BB UNIT NUMBER: NORMAL
BUN SERPL-MCNC: 7 MG/DL (ref 8–23)
BUN/CREAT SERPL: 7.1 (ref 7–25)
CALCIUM SPEC-SCNC: 8.6 MG/DL (ref 8.6–10.5)
CHLORIDE SERPL-SCNC: 94 MMOL/L (ref 98–107)
CO2 SERPL-SCNC: 33.1 MMOL/L (ref 22–29)
CREAT SERPL-MCNC: 0.99 MG/DL (ref 0.57–1)
CROSSMATCH INTERPRETATION: NORMAL
DEPRECATED RDW RBC AUTO: 51.9 FL (ref 37–54)
EGFRCR SERPLBLD CKD-EPI 2021: 61.5 ML/MIN/1.73
ERYTHROCYTE [DISTWIDTH] IN BLOOD BY AUTOMATED COUNT: 16.4 % (ref 12.3–15.4)
GLUCOSE SERPL-MCNC: 99 MG/DL (ref 65–99)
HCT VFR BLD AUTO: 24.5 % (ref 34–46.6)
HGB BLD-MCNC: 8 G/DL (ref 12–15.9)
MCH RBC QN AUTO: 27.8 PG (ref 26.6–33)
MCHC RBC AUTO-ENTMCNC: 32.7 G/DL (ref 31.5–35.7)
MCV RBC AUTO: 85.1 FL (ref 79–97)
PLATELET # BLD AUTO: 190 10*3/MM3 (ref 140–450)
PMV BLD AUTO: 11.2 FL (ref 6–12)
POTASSIUM SERPL-SCNC: 3.5 MMOL/L (ref 3.5–5.2)
RBC # BLD AUTO: 2.88 10*6/MM3 (ref 3.77–5.28)
SODIUM SERPL-SCNC: 138 MMOL/L (ref 136–145)
UNIT  ABO: NORMAL
UNIT  RH: NORMAL
WBC NRBC COR # BLD AUTO: 13.42 10*3/MM3 (ref 3.4–10.8)

## 2024-04-30 PROCEDURE — 99214 OFFICE O/P EST MOD 30 MIN: CPT | Performed by: NURSE PRACTITIONER

## 2024-04-30 PROCEDURE — 96375 TX/PRO/DX INJ NEW DRUG ADDON: CPT

## 2024-04-30 PROCEDURE — G0378 HOSPITAL OBSERVATION PER HR: HCPCS

## 2024-04-30 PROCEDURE — 25010000002 ONDANSETRON PER 1 MG

## 2024-04-30 PROCEDURE — 96376 TX/PRO/DX INJ SAME DRUG ADON: CPT

## 2024-04-30 PROCEDURE — 85027 COMPLETE CBC AUTOMATED: CPT

## 2024-04-30 PROCEDURE — 25010000002 BUMETANIDE PER 0.5 MG: Performed by: NURSE PRACTITIONER

## 2024-04-30 PROCEDURE — 25010000002 CEFTRIAXONE PER 250 MG

## 2024-04-30 PROCEDURE — 94799 UNLISTED PULMONARY SVC/PX: CPT

## 2024-04-30 PROCEDURE — 96374 THER/PROPH/DIAG INJ IV PUSH: CPT

## 2024-04-30 PROCEDURE — 94664 DEMO&/EVAL PT USE INHALER: CPT

## 2024-04-30 PROCEDURE — 25010000002 AMPICILLIN PER 500 MG: Performed by: EMERGENCY MEDICINE

## 2024-04-30 PROCEDURE — 25010000002 AMPICILLIN PER 500 MG

## 2024-04-30 PROCEDURE — 94761 N-INVAS EAR/PLS OXIMETRY MLT: CPT

## 2024-04-30 PROCEDURE — 94760 N-INVAS EAR/PLS OXIMETRY 1: CPT

## 2024-04-30 PROCEDURE — 80048 BASIC METABOLIC PNL TOTAL CA: CPT

## 2024-04-30 RX ORDER — FUROSEMIDE 10 MG/ML
40 INJECTION INTRAMUSCULAR; INTRAVENOUS EVERY 12 HOURS
Status: DISCONTINUED | OUTPATIENT
Start: 2024-04-30 | End: 2024-04-30

## 2024-04-30 RX ORDER — BUMETANIDE 0.25 MG/ML
4 INJECTION INTRAMUSCULAR; INTRAVENOUS EVERY 12 HOURS
Status: DISCONTINUED | OUTPATIENT
Start: 2024-04-30 | End: 2024-05-01 | Stop reason: HOSPADM

## 2024-04-30 RX ORDER — OXYMETAZOLINE HYDROCHLORIDE 0.05 G/100ML
2 SPRAY NASAL 2 TIMES DAILY
Qty: 6 ML | Refills: 0 | Status: DISCONTINUED | OUTPATIENT
Start: 2024-04-30 | End: 2024-05-01 | Stop reason: HOSPADM

## 2024-04-30 RX ADMIN — DILTIAZEM HYDROCHLORIDE 60 MG: 60 TABLET, FILM COATED ORAL at 21:58

## 2024-04-30 RX ADMIN — CEFTRIAXONE 2000 MG: 2 INJECTION, POWDER, FOR SOLUTION INTRAMUSCULAR; INTRAVENOUS at 10:22

## 2024-04-30 RX ADMIN — SPIRONOLACTONE 25 MG: 25 TABLET, FILM COATED ORAL at 08:43

## 2024-04-30 RX ADMIN — BUMETANIDE 4 MG: 0.25 INJECTION, SOLUTION INTRAMUSCULAR; INTRAVENOUS at 21:58

## 2024-04-30 RX ADMIN — Medication 10 ML: at 20:04

## 2024-04-30 RX ADMIN — DILTIAZEM HYDROCHLORIDE 60 MG: 60 TABLET, FILM COATED ORAL at 09:40

## 2024-04-30 RX ADMIN — HYDROCODONE BITARTRATE AND ACETAMINOPHEN 1 TABLET: 7.5; 325 TABLET ORAL at 08:43

## 2024-04-30 RX ADMIN — BUMETANIDE 4 MG: 0.25 INJECTION, SOLUTION INTRAMUSCULAR; INTRAVENOUS at 10:21

## 2024-04-30 RX ADMIN — AMPICILLIN SODIUM 2000 MG: 2 INJECTION, POWDER, FOR SOLUTION INTRAMUSCULAR; INTRAVENOUS at 17:12

## 2024-04-30 RX ADMIN — AMPICILLIN SODIUM 2000 MG: 2 INJECTION, POWDER, FOR SOLUTION INTRAMUSCULAR; INTRAVENOUS at 13:12

## 2024-04-30 RX ADMIN — BUDESONIDE 0.5 MG: 0.5 INHALANT ORAL at 19:03

## 2024-04-30 RX ADMIN — SERTRALINE 100 MG: 50 TABLET, FILM COATED ORAL at 08:44

## 2024-04-30 RX ADMIN — BUMETANIDE 2 MG: 2 TABLET ORAL at 08:44

## 2024-04-30 RX ADMIN — AMPICILLIN SODIUM 2000 MG: 2 INJECTION, POWDER, FOR SOLUTION INTRAMUSCULAR; INTRAVENOUS at 09:32

## 2024-04-30 RX ADMIN — CEFTRIAXONE 2000 MG: 2 INJECTION, POWDER, FOR SOLUTION INTRAMUSCULAR; INTRAVENOUS at 21:58

## 2024-04-30 RX ADMIN — ONDANSETRON 4 MG: 2 INJECTION INTRAMUSCULAR; INTRAVENOUS at 12:12

## 2024-04-30 RX ADMIN — Medication 10 ML: at 08:47

## 2024-04-30 RX ADMIN — ONDANSETRON 4 MG: 2 INJECTION INTRAMUSCULAR; INTRAVENOUS at 21:58

## 2024-04-30 RX ADMIN — PANTOPRAZOLE SODIUM 40 MG: 40 TABLET, DELAYED RELEASE ORAL at 20:36

## 2024-04-30 RX ADMIN — AMPICILLIN SODIUM 2000 MG: 2 INJECTION, POWDER, FOR SOLUTION INTRAMUSCULAR; INTRAVENOUS at 00:22

## 2024-04-30 RX ADMIN — PANTOPRAZOLE SODIUM 40 MG: 40 TABLET, DELAYED RELEASE ORAL at 08:43

## 2024-04-30 RX ADMIN — DILTIAZEM HYDROCHLORIDE 60 MG: 60 TABLET, FILM COATED ORAL at 04:21

## 2024-04-30 RX ADMIN — AMPICILLIN SODIUM 2000 MG: 2 INJECTION, POWDER, FOR SOLUTION INTRAMUSCULAR; INTRAVENOUS at 20:31

## 2024-04-30 RX ADMIN — ROPINIROLE 2 MG: 2 TABLET, FILM COATED ORAL at 20:35

## 2024-04-30 RX ADMIN — CYCLOBENZAPRINE 10 MG: 10 TABLET, FILM COATED ORAL at 20:34

## 2024-04-30 RX ADMIN — ZOLPIDEM TARTRATE 10 MG: 5 TABLET ORAL at 21:58

## 2024-04-30 RX ADMIN — AMPICILLIN SODIUM 2000 MG: 2 INJECTION, POWDER, FOR SOLUTION INTRAMUSCULAR; INTRAVENOUS at 04:21

## 2024-04-30 RX ADMIN — POTASSIUM CHLORIDE 40 MEQ: 750 TABLET, EXTENDED RELEASE ORAL at 00:23

## 2024-04-30 RX ADMIN — ARFORMOTEROL TARTRATE 15 MCG: 15 SOLUTION RESPIRATORY (INHALATION) at 18:59

## 2024-04-30 RX ADMIN — DILTIAZEM HYDROCHLORIDE 60 MG: 60 TABLET, FILM COATED ORAL at 17:13

## 2024-04-30 RX ADMIN — HYDROCODONE BITARTRATE AND ACETAMINOPHEN 1 TABLET: 7.5; 325 TABLET ORAL at 17:13

## 2024-04-30 RX ADMIN — TIOTROPIUM BROMIDE INHALATION SPRAY 2 PUFF: 3.12 SPRAY, METERED RESPIRATORY (INHALATION) at 06:56

## 2024-04-30 RX ADMIN — ARFORMOTEROL TARTRATE 15 MCG: 15 SOLUTION RESPIRATORY (INHALATION) at 06:55

## 2024-04-30 RX ADMIN — BUDESONIDE 0.5 MG: 0.5 INHALANT ORAL at 06:56

## 2024-04-30 RX ADMIN — ROFLUMILAST 500 MCG: 500 TABLET ORAL at 08:44

## 2024-04-30 RX ADMIN — OXYMETAZOLINE HYDROCHLORIDE 2 SPRAY: 0.05 SPRAY NASAL at 20:36

## 2024-04-30 RX ADMIN — OXYMETAZOLINE HYDROCHLORIDE 2 SPRAY: 0.05 SPRAY NASAL at 12:23

## 2024-04-30 NOTE — CONSULTS
Date of Hospital Visit:24  Encounter Provider: BA Charles  Place of Service: [facility] CARDIOLOGY  Patient Name: Paz Browne  :1955  Referral Provider: Norris Razo MD    Chief complaint: epistaxis    History of Present Illness:    Paz Browne is a 68 y.o. female  with history of paroxysmal atrial flutter/fibrillation, status post tricuspid valve repair, status post mitral valve replacement, COPD, hypertension, diastolic congestive heart failure, pulmonary hypertension, and GI bleed.         In  she had mitral valve replacement after unsuccessful repair.  She also had  tricuspid valve repair due to significant regurgitation.  She also had left cryo-maze procedure with left atrial appendage closure.  She had cardioversion in  for atrial flutter.  In  for cardiac catheterization which showed normal coronary arteries.  She had evidence of moderate pulmonary hypertension and normal wedge pressure on right heart catheterization.    In 2017 she had GI bleeding with polypectomy and gastritis and hemorrhoids noted.  She was removed from warfarin temporarily.    In 2021 she had strep bacteremia and some suspicion for vegetation.  She transesophageal echocardiogram and CT surgery evaluated and recommended IV antibiotic x 3 months..    In 2024.  She had hospitalization for COPD exacerbation.  She also had A-fib with RVR and was started on apixaban.  She had positive blood cultures for enterococcal facialis.  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  She was treated with IV antibiotics.  Her hemoglobin at discharge was 7.4.  Apixaban was continued as there was no signs and symptoms of active bleeding.      Transesophageal Echo 2024      Left ventricular systolic function is normal. Left ventricular ejection fraction appears to be 61 - 65%.    Left ventricular diastolic function  was indeterminate.    The right ventricular cavity is mildly dilated. Normal right ventricular systolic function noted.    The left atrial cavity is mildly dilated.    The left atrial appendage has been previously ligated    There is a bioprosthetic mitral valve replacement which appears to be well-seated. The bioprosthetic mitral valve leaflets are thickened.    There is a calcified nonmobile echodensity attached to the posterior leaflet of the bioprosthetic mitral valve replacement. This appears to be a calcified remnant of a prior vegetation. There are no obvious acute or mobile vegetations.    Gradients across the bioprosthetic mitral valve replacement are elevated with a peak gradient of 30 mmHg and a mean gradient of 11 mmHg (although the leaflets appear to open well). There is mild mitral regurgitation    There is an annuloplasty ring in the tricuspid position. There is moderate tricuspid regurgitation through the annuloplasty ring.    Calculated right ventricular systolic pressure from tricuspid regurgitation is 76 mmHg    There are moderate plaques in the descending thoracic aorta. There are mild plaques in the aortic arch    There is no evidence of pericardial effusion.         Approximately 2 weeks ago she presented to the emergency room with shortness of breath.  Chest x-ray showed small pleural effusions.  She also had fevers and was started on antibiotic.  She had rapid atrial fibrillation and received IV Lopressor and IV Lasix.  She reported dark stool.    An EGD/colonoscopy and was restarted on Eliquis 4/23/24 and had recurrent hemoptysis which started on 4/26.  Esophagitis with no bleeding was found in the GE junction.  Angiectasia was found in the cecum.  Coagulation for tissue destruction with argon plasma was successful.  Multiple polyp resection was completed.  Nonbleeding internal hemorrhoids were also found.    She had a chest Xray 4/25/24. Showing left lower lobe pneumonia versus mass.  Pulmonary vascular congestion. COPD.     She has received 1 unit packed red blood cells during current admission.  Cardiology has been consulted regarding chronic A-fib, atrial fibrillation with current bleeding issues.  Eliquis is held.    She denies chest pain.  She has no further dark stool.  She had 1 nosebleed again yesterday but none since.  She reports increased shortness of breath and increased wheezing.        Past Medical History:   Diagnosis Date    VAN (acute kidney injury)     Anemia     Asthma     Atrial flutter     cardioversion    Cataract     Celiac artery stenosis     Chronic respiratory failure with hypoxia     Colon polyp     COPD (chronic obstructive pulmonary disease)     GI bleed     Hiatal hernia     History of CHF (congestive heart failure)     due to MR    History of home oxygen therapy     3 lpm NC    History of mitral valve replacement with tissue graft     Hyperlipidemia     Hypertension     Infectious viral hepatitis     B    Intertrigo     Long term (current) use of anticoagulants     Mitral regurgitation     s/p tissue MVR    PAF (paroxysmal atrial fibrillation)     s/p MAZE    Pneumonia     Pulmonary hypertension     S/P TVR (tricuspid valve repair) 7/7/2016       Past Surgical History:   Procedure Laterality Date    CARDIAC CATHETERIZATION  09/01/2014    Right dominant systemt, normal coronary arteries.     CARDIAC CATHETERIZATION Left 6/10/2016    Procedure: Cardiac catheterization;  Surgeon: Sergei Hall MD;  Location: [facility] CATH INVASIVE LOCATION;  Service:     CARDIAC CATHETERIZATION N/A 6/10/2016    Procedure: Right Heart Cath;  Surgeon: Sergei Hall MD;  Location: [facility] CATH INVASIVE LOCATION;  Service:     CATARACT EXTRACTION      COLONOSCOPY      COLONOSCOPY N/A 8/4/2017    Procedure: COLONOSCOPY TO CECUM/TI WITH POLYPECTOMY ( COLD BX);  Surgeon: Cleveland Devine MD;  Location: [facility] ENDOSCOPY;  Service:     COLONOSCOPY N/A 8/10/2017    Procedure: COLONOSCOPY to cecum and TI with  2 clips placed at transverse;  Surgeon: Earnest PALOMO MD;  Location: [facility] ENDOSCOPY;  Service:     COLONOSCOPY N/A 12/22/2017    Procedure: COLONOSCOPY INTO CECUM WITH COLD POLYPECTOMIES;  Surgeon: Cleveland Devine MD;  Location: [facility] ENDOSCOPY;  Service:     COLONOSCOPY N/A 5/17/2021    Procedure: COLONOSCOPY to cecum;  Surgeon: Cleveland Devine MD;  Location: [facility] ENDOSCOPY;  Service: Gastroenterology;  Laterality: N/A;  Pre: Fe deficency anemia, h/x of polyps  Post: fair prep, normal    COLONOSCOPY W/ POLYPECTOMY N/A 4/20/2024    Procedure: COLONOSCOPY to cecum with cold and hot snare polypectomies with saline lift and APC;  Surgeon: David Payne MD;  Location: [facility] ENDOSCOPY;  Service: Gastroenterology;  Laterality: N/A;  pre- anemia  post- adequate prep, polyps, avm    ENDOSCOPY N/A 8/17/2017    Procedure: ESOPHAGOGASTRODUODENOSCOPY;  Surgeon: Porsha Ruby MD;  Location: [facility] ENDOSCOPY;  Service:     ENDOSCOPY N/A 12/22/2017    Procedure: ESOPHAGOGASTRODUODENOSCOPY WITH BIOPSIES;  Surgeon: Cleveland Devine MD;  Location: [facility] ENDOSCOPY;  Service:     ENDOSCOPY N/A 5/17/2021    Procedure: ESOPHAGOGASTRODUODENOSCOPY  with biopsies;  Surgeon: Cleveland Devine MD;  Location: [facility] ENDOSCOPY;  Service: Gastroenterology;  Laterality: N/A;  Pre: Fe deficency anemia, nausea, heme positive stool   Post: gastritis, sloughing of distal esophagus mucosa    ENDOSCOPY N/A 4/19/2024    Procedure: ESOPHAGOGASTRODUODENOSCOPY WITH BIOPSY;  Surgeon: Rick Morales MD;  Location: [facility] ENDOSCOPY;  Service: Gastroenterology;  Laterality: N/A;  PRE: ANEMIA /  POST: ESOPHAGITIS    GALLBLADDER SURGERY      HEMORRHOIDECTOMY      HYSTERECTOMY      KIDNEY SURGERY  04/22/2013    Stent placement    MAZE PROCEDURE      MITRAL VALVE REPLACEMENT      TONSILLECTOMY      TRICUSPID VALVE REPLACEMENT         No current facility-administered medications on file prior to encounter.     Current Outpatient Medications on  File Prior to Encounter   Medication Sig Dispense Refill    albuterol sulfate  (90 Base) MCG/ACT inhaler Inhale 2 puffs Every 4 (Four) Hours As Needed for Wheezing. 18 g 3    ampicillin 12 g in sodium chloride 0.9 % 500 mL Infuse 12 g into a venous catheter Continuous for 23 days. Continuous infusion IV ampicillin 12 g every 24 hours      apixaban (ELIQUIS) 5 MG tablet tablet Take 1 tablet by mouth Every 12 (Twelve) Hours. Indications: Atrial Fibrillation      atorvastatin (LIPITOR) 40 MG tablet TAKE 1 TABLET BY MOUTH EVERY DAY (Patient taking differently: Take 1 tablet by mouth Every Other Day.) 90 tablet 2    bumetanide (BUMEX) 2 MG tablet Take 1 tablet by mouth 2 (Two) Times a Day for 30 days. 60 tablet 0    cefTRIAXone 2,000 mg in sodium chloride 0.9 % 100 mL IVPB Infuse 2,000 mg into a venous catheter Every 12 (Twelve) Hours for 76 doses. Indications: Bacteria in the Blood, Endocarditis      cyclobenzaprine (FLEXERIL) 10 MG tablet TAKE 1 TABLET BY MOUTH EVERYDAY AT BEDTIME 90 tablet 3    dilTIAZem (CARDIZEM) 60 MG tablet Take 1 tablet by mouth Every 6 (Six) Hours for 30 days. 120 tablet 0    HYDROcodone-acetaminophen (NORCO) 7.5-325 MG per tablet Take 1 tablet by mouth Every 6 (Six) Hours As Needed for Moderate Pain (Chronic pain medicine for low back pain). 90 tablet 0    ipratropium-albuterol (DUO-NEB) 0.5-2.5 mg/3 ml nebulizer Take 3 mL by nebulization Every 4 (Four) Hours As Needed for Wheezing. 360 mL 3    loperamide (IMODIUM) 2 MG capsule Take 1 capsule by mouth 4 (Four) Times a Day As Needed for Diarrhea. 20 capsule 0    loratadine (CLARITIN) 10 MG tablet Take 1 tablet by mouth 2 (two) times a day.      Nebulizer device 1 Units 4 (Four) Times a Day As Needed (Wheezing). J44.1 j20.8 1 each 0    O2 (OXYGEN) Inhale 2 L/min Continuous.      pantoprazole (PROTONIX) 40 MG EC tablet Take 1 tablet by mouth 2 (Two) Times a Day for 30 days. 60 tablet 0    potassium chloride 10 MEQ CR tablet Take 2 tablets  by mouth 3 times a day for 30 days. 180 tablet 0    roflumilast (DALIRESP) 500 MCG tablet tablet Take 1 tablet by mouth Daily. 90 tablet 1    rOPINIRole (REQUIP) 2 MG tablet TAKE 1 TABLET BY MOUTH EVERY DAY AT NIGHT 90 tablet 2    sertraline (ZOLOFT) 100 MG tablet TAKE 1 TABLET BY MOUTH EVERY DAY 90 tablet 1    spironolactone (ALDACTONE) 25 MG tablet Take 1 tablet by mouth Daily for 30 days. 30 tablet 0    Umeclidinium-Vilanterol (Anoro Ellipta) 62.5-25 MCG/ACT aerosol powder  inhaler Inhale 1 puff Daily. 60 each 1    zolpidem (AMBIEN) 10 MG tablet Take 1 tablet by mouth At Night As Needed for Sleep. 30 tablet 3    budesonide (PULMICORT) 0.5 MG/2ML nebulizer solution Take 2 mL by nebulization 2 (Two) Times a Day for 30 days. Indications: Chronic Obstructive Lung Disease, Chronic hypoxic respiratory failure 360 mL 3    docosanol (ABREVA) 10 % cream cream Apply 1 Application topically to the appropriate area as directed 5 (Five) Times a Day. 2 g 0    Multiple Vitamins-Minerals (MULTIVITAL) tablet Take 1 tablet by mouth Daily.      ondansetron ODT (ZOFRAN-ODT) 4 MG disintegrating tablet Place 1 tablet on the tongue Every 8 (Eight) Hours As Needed for Nausea or Vomiting. 10 tablet 0       Social History     Socioeconomic History    Marital status:     Number of children: 2   Tobacco Use    Smoking status: Former     Current packs/day: 0.00     Average packs/day: 3.0 packs/day for 54.0 years (162.0 ttl pk-yrs)     Types: Cigarettes     Start date:      Quit date: 2007     Years since quittin.3     Passive exposure: Past    Smokeless tobacco: Never    Tobacco comments:     caffeine - tea or mt dew    Vaping Use    Vaping status: Never Used   Substance and Sexual Activity    Alcohol use: No    Drug use: No    Sexual activity: Defer       Family History   Adopted: Yes   Problem Relation Age of Onset    No Known Problems Mother     No Known Problems Father         REVIEW OF SYSTEMS:   All ROS was performed  "and is Negative except as outlined in HPI     Objective:     Vitals:    04/30/24 0239 04/30/24 0409 04/30/24 0657 04/30/24 0700   BP: 137/72 138/63  166/83   BP Location:  Right arm  Right arm   Patient Position:  Lying  Sitting   Pulse:  90 84 90   Resp: 20 22 20 20   Temp: 99.1 °F (37.3 °C) 98.4 °F (36.9 °C)  97.7 °F (36.5 °C)   TempSrc: Oral Oral  Oral   SpO2:  100% 95% 100%   Weight:       Height:         Body mass index is 19.14 kg/m².  Flowsheet Rows      Flowsheet Row First Filed Value   Admission Height 170.2 cm (67\") Documented at 04/29/2024 1900   Admission Weight 55.4 kg (122 lb 3.2 oz) Documented at 04/29/2024 1927            Physical Exam   Pulmonary:      Breath sounds: Decreased breath sounds present. Rhonchi present. Rales present.   Cardiovascular:      Normal rate. Irregularly irregular rhythm.      Murmurs: There is a systolic murmur.         Lab Review:                Results from last 7 days   Lab Units 04/30/24  0412   SODIUM mmol/L 138   POTASSIUM mmol/L 3.5   CHLORIDE mmol/L 94*   CO2 mmol/L 33.1*   BUN mg/dL 7*   CREATININE mg/dL 0.99   GLUCOSE mg/dL 99   CALCIUM mg/dL 8.6         Results from last 7 days   Lab Units 04/30/24  0412   WBC 10*3/mm3 13.42*   HEMOGLOBIN g/dL 8.0*   HEMATOCRIT % 24.5*   PLATELETS 10*3/mm3 190             Results from last 7 days   Lab Units 04/29/24  1805   MAGNESIUM mg/dL 1.8             I personally viewed and interpreted the patient's EKG/Telemetry data     Assessment:      1.  68-year-old female with epistaxis  2. Anemia status post blood transfusion  3. Persistent atrial fibrillation status post left atrial appendage occlusion 2014.  4.  COPD  5.  Status post mitral valve replacement.  TEE4 /4/24 showed increased gradients with calcified on her prior vegetation but no obvious or acute mobile vegetation.  6.  Status post tricuspid valve repair  7.  Normal coronary arteries and a right dominant infection on cardiac cath 08/2024  8.  Pulmonary hypertension.  " RVSP 76 mmHg on CHIO April 2024  9.  History of GI bleed 2017  10.  Moderate plaque in the descending thoracic aorta with mild plaque in the aortic arch  11.  Anxiety/depression on therapy  12.  Diastolic congestive heart failure  13.  Recent septicemia-continue treatment per admitting          Plan:      -Recent x-ray shows vascular congestion and she has rales on exam.  I will hold oral bumetanide and give IV Bumetanide 4 mg BID an monitor response per Dr. Null  -I recommend staying another night  -I agree with holding Eliquis.  I spoke with Dr. Haddad.  Her risk of bleeding outweighs the benefit of stroke/clot protection with Eliquis at this time.  -Consider transitioning back to aspirin which she tolerated better prior to discharge  -I recommend ENT evaluation postdischarge  -Given abnormal chest x-ray 4/25/2024-I would recommend follow-up imaging while here and have placed an order for repeat chest x-ray in the morning

## 2024-04-30 NOTE — PLAN OF CARE
Goal Outcome Evaluation:   Patient continues to increase in health after receiving antibiotics. Patient has kept in good spirits throughout the day.

## 2024-04-30 NOTE — PLAN OF CARE
Goal Outcome Evaluation:      Patient admitted for epistaxis and anemia, cardiology consult for chronic afib and anticoagulation. Received 1 unit prbc, tolerated well, hgb this morning 8.1, no further bleeding since arrival to the unit. VSS, is in afib rate controlled. Has a double lumen PICC to left upper arm for antibiotics at home treating septicemia. Up ad bob, A&Ox4,  at bedside

## 2024-04-30 NOTE — PROGRESS NOTES
.ED OBSERVATION PROGRESS/DISCHARGE SUMMARY    Date of Admission: 4/29/2024   LOS: 0 days   PCP: Javan Martinez MD      Subjective     Hospital Outcome:   68-year-old female seen and examined at bedside following admission to the observation unit due to epistaxis and anemia.  Patient recently had her Eliquis restarted on 4/23 after he was held for EGD and colonoscopy that was done for GI bleed.  Yesterday patient attempted to blow her nose and subsequently developed a nosebleed bilaterally.  Patient was unable to stop the bleed however this stopped when she arrived to the ED.  Patient with complex medical history including CKD, chronic hypoxemic respiratory failure, A-fib on Eliquis, CHF and COPD.  She was recently hospitalized for CHF exacerbation, Enterococcus septicemia and pneumonia.    Laboratory evaluation in the ED shows hypokalemia, mild dehydration, leukocytosis and anemia with a hemoglobin of 7.  Potassium was replaced and this morning is 30.5 and overnight patient transfused with 1 unit of PRBCs and her hemoglobin went from 7-8.    Mony FRANK with cardiology evaluated patient and has held bumetanide and start the IV Lasix twice daily.  Recommend staying another night and to continue holding Eliquis.  Chest x-ray on 4/25 showed some vascular congestion and patient has rales on exam therefore repeat chest x-ray have been ordered for tomorrow morning.      ROS:  General: no fevers, chills  Respiratory: no cough, dyspnea  Cardiovascular: no chest pain, palpitations  Abdomen: No abdominal pain, nausea, vomiting, or diarrhea  Neurologic: No focal weakness    Objective   Physical Exam:  I have reviewed the vital signs.  Temp:  [97.2 °F (36.2 °C)-99.1 °F (37.3 °C)] 98.4 °F (36.9 °C)  Heart Rate:  [] 84  Resp:  [14-22] 20  BP: (122-176)/() 138/63  General Appearance:    Alert, cooperative, no distress  Head:    Normocephalic, atraumatic  Eyes:    Sclerae anicteric  Neck:   Supple, no mass  Lungs:  Clear to auscultation bilaterally, respirations unlabored  Heart: Regular rate and rhythm, S1 and S2 normal, no murmur, rub or gallop  Abdomen:  Soft, non-tender, bowel sounds active, nondistended  Extremities: No clubbing, cyanosis, or edema to lower extremities  Pulses:  2+ and symmetric in distal lower extremities  Skin: No rashes   Neurologic: Oriented x3, Normal strength to extremities    Results Review:    I have reviewed the labs, radiology results and diagnostic studies.    Results from last 7 days   Lab Units 04/30/24  0412   WBC 10*3/mm3 13.42*   HEMOGLOBIN g/dL 8.0*   HEMATOCRIT % 24.5*   PLATELETS 10*3/mm3 190     Results from last 7 days   Lab Units 04/30/24  0412 04/29/24  1805 04/26/24  1146   SODIUM mmol/L 138 136 138   POTASSIUM mmol/L 3.5 3.2* 2.8*   CHLORIDE mmol/L 94* 91* 94*   CO2 mmol/L 33.1* 31.4* 33.0*   BUN mg/dL 7* 11 12   CREATININE mg/dL 0.99 1.09* 1.15*   CALCIUM mg/dL 8.6 8.7 8.3*   BILIRUBIN mg/dL  --  0.7 0.4   ALK PHOS U/L  --  74 65   ALT (SGPT) U/L  --  19 22   AST (SGOT) U/L  --  24 16   GLUCOSE mg/dL 99 96 111*     Imaging Results (Last 24 Hours)       ** No results found for the last 24 hours. **            I have reviewed the medications.  ---------------------------------------------------------------------------------------------  Assessment & Plan   Assessment/Problem List    Epistaxis      Plan:    Epistaxis/anemia  -Cardiac monitoring  -Vital signs every 4 hours  -Continuous pulse ox  -Transfuse 1 unit RBCs tonight  -Initial hemoglobin 7, repeat this morning posttransfusion is 8     Atrial fibrillation  -Continue Cardizem  -Hold Eliquis until seen by cardiology  -Cardiology consult in a.m.        COPD  -Continue home O2  -Continue home inhalers    Disposition: Most likely inpatient    Follow-up after Discharge: PCP and cardiology    This note will serve as a discharge summary    BA Lagos 04/30/24 07:27 EDT    I have worn appropriate PPE during this patient  encounter, sanitized my hands both with entering and exiting patient's room.      58 minutes has been spent by [facility] providers in the care of this patient while under observation status

## 2024-04-30 NOTE — OUTREACH NOTE
CHF Week 2 Survey      Flowsheet Row Responses   [facility] patient discharged from? [place]   Does the patient have one of the following disease processes/diagnoses(primary or secondary)? CHF   Week 2 attempt successful? No   Unsuccessful attempts Attempt 1   Revoke Readmitted            Bambi RICE - Registered Nurse

## 2024-04-30 NOTE — CASE MANAGEMENT/SOCIAL WORK
Discharge Planning Assessment  [facility]     Patient Name: Paz Browne  MRN: [id number]  Today's Date: 4/30/2024    Admit Date: 4/29/2024    Plan: Return home with spouse to transport. No needs anticipated.   Discharge Needs Assessment       Row Name 04/30/24 1251       Living Environment    People in Home spouse;other relative(s)    Name(s) of People in Home Chapincito (spouse) and brother in law.    Current Living Arrangements home    Potentially Unsafe Housing Conditions none    Primary Care Provided by self;spouse/significant other    Provides Primary Care For no one    Family Caregiver if Needed spouse    Family Caregiver Names Chapincito (spouse)    Quality of Family Relationships helpful;involved;supportive    Able to Return to Prior Arrangements yes    Living Arrangement Comments Patient resides with her , Chapincito, and her brother in law in a mobile home with ramped entrance.       Resource/Environmental Concerns    Resource/Environmental Concerns none    Transportation Concerns none       Transition Planning    Patient/Family Anticipates Transition to home with family    Patient/Family Anticipated Services at Transition other (see comments)  Continue with [facility] for outpatient IV antibiotic infusions and PICC line care.    Transportation Anticipated family or friend will provide  Spouse to provide discharge transportation. Patient knows spouse needs to bring portable home O2 for ride home.       Discharge Needs Assessment    Current Outpatient/Agency/Support Group infusion therapy, outpatient    Equipment Currently Used at Home oxygen;nebulizer    Concerns to be Addressed no discharge needs identified;denies needs/concerns at this time    Anticipated Changes Related to Illness none    Equipment Needed After Discharge none    Patient's Choice of Community Agency(s) Continue with [facility] for outpatient IV antiobiotic therapy and PICC line care.                   Discharge Plan       Row Name  04/30/24 1255       Plan    Plan Return home with spouse to transport. No needs anticipated.    Patient/Family in Agreement with Plan yes    Plan Comments Patient plans to return home upon discharge, where she lives with her , Chapincito, and her brother in law in trailer home with ramped entrance. She is IND with IADL's at baseline, but has assistance from  and surrounding family if need be. She no longer drives, but has reliable transportation from family.  to provide discharge transportation and knows to bring portable home O2 for ride home. Patient reports use of nebulizer and home O2 (concentrator and tanks). She is unsure of the DME company through which these are supplied, but reports no issues with their functioning. She denies any recent falls. She uses [facility] in [place] and any new prescriptions can be sent there. She is currently receiving outpatient IV antibiotics (at home) through [facility] for history of septicemia. Her PICC line dressing is changed every Friday at their infusion clinic. She would like to continue their services. Patient requesting dressing be changed to PICC site prior to discharge so she does not have to go have it done on Friday. I will speak to provider and primary RN about this. Patient denies any discharge needs at this time and is eager to return home. No needs anticipated. Encouraged to contact CCP should her needs change.                  Continued Care and Services - Admitted Since 4/29/2024    No active coordination exists for this encounter.       Selected Continued Care - Prior Encounters Includes continued care and service providers with selected services from prior encounters from 1/30/2024 to 4/30/2024      Discharged on 4/21/2024 Admission date: 4/11/2024 - Discharge disposition: Home or Self Care      Dialysis/Infusion       Service Provider Selected Services Address Phone Fax Patient Preferred    [facility] Infusion and IV  Therapy ,  Infusion Clinic [address] [place] [id number] [phone] --                      Discharged on 4/6/2024 Admission date: 3/31/2024 - Discharge disposition: Home or Self Care      Dialysis/Infusion       Service Provider Selected Services Address Phone Fax Patient Preferred    [facility] Infusion and IV Therapy [id number] [place] [id number] [phone] --                             Demographic Summary       Row Name 04/30/24 1248       General Information    Admission Type observation    Arrived From home    Required Notices Provided Observation Status Notice    Referral Source admission list    Reason for Consult discharge planning    Preferred Language English    General Information Comments Facesheet verified. Role of CCP explained. Appropriate PPE worn at all times.       Contact Information    Permission Granted to Share Info With family/designee                   Functional Status       Row Name 04/30/24 1250       Functional Status, IADL    Medications independent    Meal Preparation independent    Housekeeping independent    Laundry independent    Shopping independent    IADL Comments Patient reports IND with IADL's. She no longer drives, but has reliable transportation from family.       Mental Status    General Appearance WDL WDL                   Psychosocial       Row Name 04/30/24 1251       Behavior WDL    Behavior WDL WDL       Emotion Mood WDL    Emotion/Mood/Affect WDL WDL       Speech WDL    Speech WDL WDL       Perceptual State WDL    Perceptual State WDL WDL       Thought Process WDL    Thought Process WDL WDL       Intellectual Performance WDL    Intellectual Performance WDL WDL       Coping/Stress    Patient Personal Strengths expressive of emotions;expressive of needs;positive attitude;strong support system    Sources of Support other family members;spouse    Understanding of Condition and  Treatment adequate understanding of medical condition;adequate understanding of treatment       Developmental Stage (Erikteganon's)    Developmental Stage Stage 8 (65 years-death/Late Adulthood) Integrity vs. Despair                   Abuse/Neglect       Row Name 04/30/24 1251       Personal Safety    Physical Signs of Abuse Present no                   Legal    No documentation.                  Substance Abuse    No documentation.                  Patient Forms    No documentation.                     Earnest Romeo RN

## 2024-04-30 NOTE — PROGRESS NOTES
MD Attestation Note    SHARED VISIT: This visit was performed by BOTH a physician and an APC. The substantive portion of the medical decision making was performed by this attesting physician who made or approved the management plan and takes responsibility for patient management. All studies in the APC note (if performed) were independently interpreted by me.       My personal findings are:        Subjective:  68-year-old female admitted from the emergency department with epistaxis and symptomatic anemia.  Her epistaxis had resolved spontaneously without treatment however hemoglobin was 7.0 on admission.  She has received 1 unit of packed red blood cells.  She denies further bleeding overnight.  She does have a history of COPD on nasal cannula oxygen at home, mitral valve replacement, prior GI bleed, and atrial fibrillation on chronic anticoagulation with Eliquis.  She also had recent Enterococcus septicemia for which she has been on outpatient IV antibiotics.      Objective:  GENERAL: Awake, alert, chronically ill-appearing, in no acute distress.   HENT:  Normocephalic, atraumatic. Nares patent with some dried blood in the nares bilaterally but no active bleeding, nasal cannula oxygen in place.  EYES: Pupils equal, reactive. Extra-ocular movements normal.   RESPIRATORY: normal effort.   CARDIOVASCULAR: Regular rhythm, normal rate, no murmur appreciated.   ABDOMEN:  Nontender. Abdomen nondistended.   NEUROLOGIC: Cranial nerves II-XII normal. Motor strength 5/5 in extremities.   EXTREMITIES: No pedal edema. 2+ pedal pulses bilaterally. No deformity.   SKIN:  no rash, normal to inspection.          Assessment/ Plan:  Epistaxis  Bleeding resolved spontaneously.  Continue to monitor for recurrent bleeding.  Patient is having nasal congestion making it hard for her to breathe and she is on nasal cannula oxygen.  Will trial Afrin every 12 hours as needed for maximum of 3 days of therapy.  I confirmed that she has  humidified oxygen at home.  Outpatient ENT evaluation.  Holding Eliquis now per cardiology recommendations.    Symptomatic anemia  Hemoglobin 7.0 on arrival, 8.0 following blood transfusion.  Continue to trend H&H.  Eliquis held per cardiology recommendations as her bleeding risk outweighs benefits.  Perhaps patient would be a candidate for Watchman device in future?    Enterococcus septicemia  No infectious symptoms currently, continuing ampicillin and Rocephin as prescribed per ID at recent discharge.    Chronic congestive heart failure  Cardiology would like to diurese further over the next 24 hours and reassess tomorrow.

## 2024-05-01 ENCOUNTER — APPOINTMENT (OUTPATIENT)
Dept: GENERAL RADIOLOGY | Facility: HOSPITAL | Age: 69
End: 2024-05-01
Payer: MEDICARE

## 2024-05-01 ENCOUNTER — READMISSION MANAGEMENT (OUTPATIENT)
Dept: CALL CENTER | Facility: HOSPITAL | Age: 69
End: 2024-05-01
Payer: MEDICARE

## 2024-05-01 VITALS
RESPIRATION RATE: 18 BRPM | SYSTOLIC BLOOD PRESSURE: 118 MMHG | TEMPERATURE: 98.6 F | DIASTOLIC BLOOD PRESSURE: 56 MMHG | WEIGHT: 122.2 LBS | BODY MASS INDEX: 19.18 KG/M2 | HEART RATE: 87 BPM | HEIGHT: 67 IN | OXYGEN SATURATION: 96 %

## 2024-05-01 LAB
ANION GAP SERPL CALCULATED.3IONS-SCNC: 10.7 MMOL/L (ref 5–15)
BUN SERPL-MCNC: 6 MG/DL (ref 8–23)
BUN/CREAT SERPL: 6.1 (ref 7–25)
CALCIUM SPEC-SCNC: 8.7 MG/DL (ref 8.6–10.5)
CHLORIDE SERPL-SCNC: 93 MMOL/L (ref 98–107)
CO2 SERPL-SCNC: 33.3 MMOL/L (ref 22–29)
CREAT SERPL-MCNC: 0.98 MG/DL (ref 0.57–1)
DEPRECATED RDW RBC AUTO: 51.3 FL (ref 37–54)
EGFRCR SERPLBLD CKD-EPI 2021: 62.2 ML/MIN/1.73
ERYTHROCYTE [DISTWIDTH] IN BLOOD BY AUTOMATED COUNT: 16.5 % (ref 12.3–15.4)
GLUCOSE SERPL-MCNC: 113 MG/DL (ref 65–99)
HCT VFR BLD AUTO: 25.8 % (ref 34–46.6)
HGB BLD-MCNC: 8.3 G/DL (ref 12–15.9)
MAGNESIUM SERPL-MCNC: 1.7 MG/DL (ref 1.6–2.4)
MCH RBC QN AUTO: 27.3 PG (ref 26.6–33)
MCHC RBC AUTO-ENTMCNC: 32.2 G/DL (ref 31.5–35.7)
MCV RBC AUTO: 84.9 FL (ref 79–97)
PLATELET # BLD AUTO: 198 10*3/MM3 (ref 140–450)
PMV BLD AUTO: 10.2 FL (ref 6–12)
POTASSIUM SERPL-SCNC: 2.8 MMOL/L (ref 3.5–5.2)
RBC # BLD AUTO: 3.04 10*6/MM3 (ref 3.77–5.28)
SODIUM SERPL-SCNC: 137 MMOL/L (ref 136–145)
WBC NRBC COR # BLD AUTO: 14.4 10*3/MM3 (ref 3.4–10.8)

## 2024-05-01 PROCEDURE — 94761 N-INVAS EAR/PLS OXIMETRY MLT: CPT

## 2024-05-01 PROCEDURE — 94664 DEMO&/EVAL PT USE INHALER: CPT

## 2024-05-01 PROCEDURE — 85027 COMPLETE CBC AUTOMATED: CPT

## 2024-05-01 PROCEDURE — 25010000002 AMPICILLIN PER 500 MG: Performed by: EMERGENCY MEDICINE

## 2024-05-01 PROCEDURE — 80048 BASIC METABOLIC PNL TOTAL CA: CPT

## 2024-05-01 PROCEDURE — G0378 HOSPITAL OBSERVATION PER HR: HCPCS

## 2024-05-01 PROCEDURE — 94799 UNLISTED PULMONARY SVC/PX: CPT

## 2024-05-01 PROCEDURE — 25010000002 ONDANSETRON PER 1 MG

## 2024-05-01 PROCEDURE — 71046 X-RAY EXAM CHEST 2 VIEWS: CPT

## 2024-05-01 PROCEDURE — 83735 ASSAY OF MAGNESIUM: CPT

## 2024-05-01 PROCEDURE — 96376 TX/PRO/DX INJ SAME DRUG ADON: CPT

## 2024-05-01 RX ORDER — ASPIRIN 81 MG/1
81 TABLET ORAL DAILY
Qty: 30 TABLET | Refills: 0 | Status: SHIPPED | OUTPATIENT
Start: 2024-05-01

## 2024-05-01 RX ORDER — POTASSIUM CHLORIDE 750 MG/1
40 TABLET, FILM COATED, EXTENDED RELEASE ORAL EVERY 4 HOURS
Status: DISCONTINUED | OUTPATIENT
Start: 2024-05-01 | End: 2024-05-01 | Stop reason: HOSPADM

## 2024-05-01 RX ADMIN — ROFLUMILAST 500 MCG: 500 TABLET ORAL at 08:40

## 2024-05-01 RX ADMIN — HYDROCODONE BITARTRATE AND ACETAMINOPHEN 1 TABLET: 7.5; 325 TABLET ORAL at 08:39

## 2024-05-01 RX ADMIN — POTASSIUM CHLORIDE 40 MEQ: 750 TABLET, EXTENDED RELEASE ORAL at 08:39

## 2024-05-01 RX ADMIN — TIOTROPIUM BROMIDE INHALATION SPRAY 2 PUFF: 3.12 SPRAY, METERED RESPIRATORY (INHALATION) at 06:48

## 2024-05-01 RX ADMIN — DILTIAZEM HYDROCHLORIDE 60 MG: 60 TABLET, FILM COATED ORAL at 04:10

## 2024-05-01 RX ADMIN — HYDROCODONE BITARTRATE AND ACETAMINOPHEN 1 TABLET: 7.5; 325 TABLET ORAL at 04:10

## 2024-05-01 RX ADMIN — AMPICILLIN SODIUM 2000 MG: 2 INJECTION, POWDER, FOR SOLUTION INTRAMUSCULAR; INTRAVENOUS at 01:44

## 2024-05-01 RX ADMIN — ONDANSETRON 4 MG: 2 INJECTION INTRAMUSCULAR; INTRAVENOUS at 04:10

## 2024-05-01 RX ADMIN — BUDESONIDE 0.5 MG: 0.5 INHALANT ORAL at 06:48

## 2024-05-01 RX ADMIN — PANTOPRAZOLE SODIUM 40 MG: 40 TABLET, DELAYED RELEASE ORAL at 08:39

## 2024-05-01 RX ADMIN — ATORVASTATIN CALCIUM 40 MG: 20 TABLET, FILM COATED ORAL at 08:39

## 2024-05-01 RX ADMIN — ARFORMOTEROL TARTRATE 15 MCG: 15 SOLUTION RESPIRATORY (INHALATION) at 06:48

## 2024-05-01 RX ADMIN — SPIRONOLACTONE 25 MG: 25 TABLET, FILM COATED ORAL at 08:39

## 2024-05-01 RX ADMIN — DILTIAZEM HYDROCHLORIDE 60 MG: 60 TABLET, FILM COATED ORAL at 08:40

## 2024-05-01 RX ADMIN — SERTRALINE 100 MG: 50 TABLET, FILM COATED ORAL at 08:39

## 2024-05-01 RX ADMIN — POTASSIUM CHLORIDE 40 MEQ: 750 TABLET, EXTENDED RELEASE ORAL at 05:09

## 2024-05-01 NOTE — DISCHARGE SUMMARY
ED OBSERVATION PROGRESS/DISCHARGE SUMMARY    Date of Admission: 4/29/2024   LOS: 0 days   PCP: Javan Martinez MD    Final Diagnosis epistaxis     Hospital Outcome:     4/30/2024: 68-year-old female seen and examined at bedside following admission to the observation unit due to epistaxis and anemia.  Patient recently had her Eliquis restarted on 4/23 after he was held for EGD and colonoscopy that was done for GI bleed.  Yesterday patient attempted to blow her nose and subsequently developed a nosebleed bilaterally.  Patient was unable to stop the bleed however this stopped when she arrived to the ED.  Patient with complex medical history including CKD, chronic hypoxemic respiratory failure, A-fib on Eliquis, CHF and COPD.  She was recently hospitalized for CHF exacerbation, Enterococcus septicemia and pneumonia.     Laboratory evaluation in the ED shows hypokalemia, mild dehydration, leukocytosis and anemia with a hemoglobin of 7.  Potassium was replaced and this morning is 30.5 and overnight patient transfused with 1 unit of PRBCs and her hemoglobin went from 7-8.     Mony FRANK with cardiology evaluated patient and has held bumetanide and start the IV Lasix twice daily.  Recommend staying another night and to continue holding Eliquis.  Chest x-ray on 4/25 showed some vascular congestion and patient has rales on exam therefore repeat chest x-ray have been ordered for tomorrow morning.    5/1/2024:  No further bleeding noted overnight.  Patient diuresing well.  Potassium 2.8 this a.m. and replaced. Patient tolerated ambulation this morning.  She is on 3 L of oxygen which is her baseline.  She reports her dyspnea is improved.  Chest x-ray shows resolution of bilateral pleural effusions, diminished vascular congestion.  I spoke with cardiology and  the patient can be discharged home with 81 mg aspirin.  We will stop her Eliquis for now and she will follow-up with them in the office in 1 month.  She will be  discharged home.    ROS:  General: no fevers, chills  Respiratory: no cough, dyspnea  Cardiovascular: no chest pain, palpitations  Abdomen: No abdominal pain, nausea, vomiting, or diarrhea  Neurologic: No focal weakness    Objective   Physical Exam:  I have reviewed the vital signs.  Temp:  [96 °F (35.6 °C)-99.9 °F (37.7 °C)] 98.6 °F (37 °C)  Heart Rate:  [] 87  Resp:  [15-22] 18  BP: (118-139)/(56-68) 118/56  General Appearance:    Alert, cooperative, no distress  Head:    Normocephalic, atraumatic  Eyes:    Sclerae anicteric  Neck:   Supple, no mass  Lungs: Clear to auscultation bilaterally, respirations unlabored  Heart: Regular rate and rhythm, S1 and S2 normal, no murmur, rub or gallop  Abdomen:  Soft, non-tender, bowel sounds active, nondistended  Extremities: No clubbing, cyanosis, or edema to lower extremities  Pulses:  2+ and symmetric in distal lower extremities  Skin: No rashes   Neurologic: Oriented x3, Normal strength to extremities    Results Review:    I have reviewed the labs, radiology results and diagnostic studies.    Results from last 7 days   Lab Units 05/01/24  0358   WBC 10*3/mm3 14.40*   HEMOGLOBIN g/dL 8.3*   HEMATOCRIT % 25.8*   PLATELETS 10*3/mm3 198     Results from last 7 days   Lab Units 05/01/24  0358 04/30/24  0412 04/29/24  1805 04/26/24  1146   SODIUM mmol/L 137 138 136 138   POTASSIUM mmol/L 2.8* 3.5 3.2* 2.8*   CHLORIDE mmol/L 93* 94* 91* 94*   CO2 mmol/L 33.3* 33.1* 31.4* 33.0*   BUN mg/dL 6* 7* 11 12   CREATININE mg/dL 0.98 0.99 1.09* 1.15*   CALCIUM mg/dL 8.7 8.6 8.7 8.3*   BILIRUBIN mg/dL  --   --  0.7 0.4   ALK PHOS U/L  --   --  74 65   ALT (SGPT) U/L  --   --  19 22   AST (SGOT) U/L  --   --  24 16   GLUCOSE mg/dL 113* 99 96 111*     Imaging Results (Last 24 Hours)       Procedure Component Value Units Date/Time    XR Chest 2 View [[id number]] Collected: 05/01/24 0733     Updated: 05/01/24 0740    Narrative:      XR CHEST 2 VW-     Clinical: Follow-up pleural  effusion     COMPARISON examination 4/14/2024     FINDINGS: Bilateral pleural effusions resolved in the interim.  Diminished vascular congestion. There is emphysema with chronic  interstitial change which is most pronounced on the right. No new area  of airspace disease has developed within the interim. The remainder is  unremarkable.     This report was finalized on 5/1/2024 7:36 AM by Dr. Leonel Smith M.D  on Workstation: [id number]               I have reviewed the medications.  ---------------------------------------------------------------------------------------------  Assessment & Plan   Assessment/Problem List    Epistaxis    Plan:    Epistaxis/anemia  -Hemoglobin 7 on admission, 8 yesterday, 8.3 today  -She received 1 unit RBC  -She denies any melena or hematochezia, had colonoscopy with polypectomy last month  -Epistaxis resolved  -Recommended petroleum jelly to nares bilaterally daily to help prevent further bleeding  -Sent referral for outpatient evaluation with ENT    Atrial fibrillation  -Continue Cardizem  -Hold Eliquis due to recurrent issues with bleeding  -Start 81 mg of aspirin daily  -Cardiology will follow-up with the patient in office in 1 month  -CBC recommended in 2 to 3 days    Diastolic congestive heart failure  -She received IV diuretics yesterday  -She reports decreased dyspnea and her chest x-ray is improved this morning  -Will continue her usual dose of diuretic at discharge  -Follow-up with cardiology in 1 month    Hypokalemia  -She received replacement per protocol today  -Continue twice daily potassium supplementation  -BMP later this week    COPD  -She is on her baseline 3 L of oxygen  -Continue home inhalers    Recent septicemia  -She will continue home IV antibiotics    Disposition: Home    Follow-up after Discharge: Please have labs checked in 2 to 3 days.  Follow-up with your primary care provider in 1 to 2 weeks.  Follow-up with cardiology in 1 month.    This note will serve  as a discharge summary    Yun Hunt, APRN 05/01/24 08:32 EDT    I have worn appropriate PPE during this patient encounter, sanitized my hands both with entering and exiting patient's room.    32 minutes has been spent by [facility] providers in the care of this patient while under observation status

## 2024-05-01 NOTE — PLAN OF CARE
Problem: Adult Inpatient Plan of Care  Goal: Plan of Care Review  Outcome: Ongoing, Progressing  Flowsheets (Taken 5/1/2024 0252)  Progress: improving  Plan of Care Reviewed With:   patient   spouse  Outcome Evaluation: Patient admitted with epitaxis. Recieved one unit of blood. Patient remains on 3L O2 and receiving IV antibiotics. AFIB  Goal: Patient-Specific Goal (Individualized)  Outcome: Ongoing, Progressing  Goal: Absence of Hospital-Acquired Illness or Injury  Outcome: Ongoing, Progressing  Intervention: Identify and Manage Fall Risk  Recent Flowsheet Documentation  Taken 5/1/2024 0200 by Shailesh Foster RN  Safety Promotion/Fall Prevention:   activity supervised   clutter free environment maintained   fall prevention program maintained   nonskid shoes/slippers when out of bed   safety round/check completed   room organization consistent  Taken 5/1/2024 0000 by Shailesh Foster RN  Safety Promotion/Fall Prevention:   activity supervised   clutter free environment maintained   fall prevention program maintained   nonskid shoes/slippers when out of bed   safety round/check completed   room organization consistent  Taken 4/30/2024 1908 by Shailesh Foster RN  Safety Promotion/Fall Prevention: safety round/check completed  Intervention: Prevent Skin Injury  Recent Flowsheet Documentation  Taken 5/1/2024 0200 by Shailesh Foster RN  Body Position: position changed independently  Taken 5/1/2024 0000 by Shailesh Foster RN  Body Position: position changed independently  Taken 4/30/2024 1908 by Shailesh Foster RN  Body Position: position changed independently  Intervention: Prevent and Manage VTE (Venous Thromboembolism) Risk  Recent Flowsheet Documentation  Taken 5/1/2024 0000 by Shailesh Foster RN  Activity Management: ambulated in room  Taken 4/30/2024 1908 by Shailesh Foster RN  Activity Management: ambulated in room  Intervention: Prevent Infection  Recent Flowsheet Documentation  Taken 5/1/2024 0200 by Shailesh Foster RN  Infection  Prevention:   rest/sleep promoted   hand hygiene promoted   single patient room provided   personal protective equipment utilized  Taken 5/1/2024 0000 by Shailesh Foster RN  Infection Prevention:   rest/sleep promoted   hand hygiene promoted   single patient room provided   personal protective equipment utilized  Taken 4/30/2024 1908 by Shailesh Foster RN  Infection Prevention:   single patient room provided   rest/sleep promoted  Goal: Optimal Comfort and Wellbeing  Outcome: Ongoing, Progressing  Intervention: Monitor Pain and Promote Comfort  Recent Flowsheet Documentation  Taken 4/30/2024 1908 by Shailesh Foster RN  Pain Management Interventions: see MAR  Intervention: Provide Person-Centered Care  Recent Flowsheet Documentation  Taken 4/30/2024 1908 by Shailesh Foster RN  Trust Relationship/Rapport:   care explained   choices provided  Goal: Readiness for Transition of Care  Outcome: Ongoing, Progressing     Problem: COPD (Chronic Obstructive Pulmonary Disease) Comorbidity  Goal: Maintenance of COPD Symptom Control  Outcome: Ongoing, Progressing  Intervention: Maintain COPD-Symptom Control  Recent Flowsheet Documentation  Taken 5/1/2024 0200 by Shailesh Foster RN  Medication Review/Management: medications reviewed  Taken 5/1/2024 0000 by Shailesh Foster RN  Medication Review/Management: medications reviewed     Problem: Hypertension Comorbidity  Goal: Blood Pressure in Desired Range  Outcome: Ongoing, Progressing  Intervention: Maintain Blood Pressure Management  Recent Flowsheet Documentation  Taken 5/1/2024 0200 by Shailesh Foster RN  Medication Review/Management: medications reviewed  Taken 5/1/2024 0000 by Shailesh Foster RN  Medication Review/Management: medications reviewed     Problem: Fall Injury Risk  Goal: Absence of Fall and Fall-Related Injury  Outcome: Ongoing, Progressing  Intervention: Identify and Manage Contributors  Recent Flowsheet Documentation  Taken 5/1/2024 0200 by Shailesh Foster RN  Medication Review/Management:  medications reviewed  Taken 5/1/2024 0000 by Shailesh Foster RN  Medication Review/Management: medications reviewed  Intervention: Promote Injury-Free Environment  Recent Flowsheet Documentation  Taken 5/1/2024 0200 by Shailesh Foster RN  Safety Promotion/Fall Prevention:   activity supervised   clutter free environment maintained   fall prevention program maintained   nonskid shoes/slippers when out of bed   safety round/check completed   room organization consistent  Taken 5/1/2024 0000 by Shailesh Foster RN  Safety Promotion/Fall Prevention:   activity supervised   clutter free environment maintained   fall prevention program maintained   nonskid shoes/slippers when out of bed   safety round/check completed   room organization consistent  Taken 4/30/2024 1908 by Shailesh Foster, RN  Safety Promotion/Fall Prevention: safety round/check completed   Goal Outcome Evaluation:  Plan of Care Reviewed With: patient, spouse        Progress: improving  Outcome Evaluation: Patient admitted with epitaxis. Recieved one unit of blood. Patient remains on 3L O2 and receiving IV antibiotics. AFIB

## 2024-05-01 NOTE — DISCHARGE INSTRUCTIONS
Will need to have labs checked later this week  Follow-up with your primary care provider in 1 to 2 weeks  Follow-up with cardiology in 1 month

## 2024-05-01 NOTE — PLAN OF CARE
Goal Outcome Evaluation:   Pt and  VU of dc and fu instructions, including med schedule.

## 2024-05-02 NOTE — OUTREACH NOTE
Prep Survey      Flowsheet Row Responses   [facility] facility patient discharged from? [facility]   Is LACE score < 7 ? No   Eligibility Readm Mgmt   Discharge diagnosis Epistaxis   Does the patient have one of the following disease processes/diagnoses(primary or secondary)? Other   Does the patient have Home health ordered? No   Is there a DME ordered? No   Prep survey completed? Yes            LARISSA HOOKS - Registered Nurse

## 2024-05-03 ENCOUNTER — HOSPITAL ENCOUNTER (OUTPATIENT)
Dept: INFUSION THERAPY | Facility: HOSPITAL | Age: 69
Discharge: HOME | End: 2024-05-03
Payer: MEDICARE

## 2024-05-03 VITALS
OXYGEN SATURATION: 91 % | SYSTOLIC BLOOD PRESSURE: 132 MMHG | RESPIRATION RATE: 20 BRPM | HEART RATE: 92 BPM | DIASTOLIC BLOOD PRESSURE: 44 MMHG | TEMPERATURE: 98.4 F

## 2024-05-03 DIAGNOSIS — A41.81 SEPTICEMIA DUE TO ENTEROCOCCUS: ICD-10-CM

## 2024-05-03 LAB
ANION GAP SERPL CALCULATED.3IONS-SCNC: 13 MMOL/L (ref 5–15)
BASOPHILS # BLD AUTO: 0.04 10*3/MM3 (ref 0–0.2)
BASOPHILS NFR BLD AUTO: 0.4 % (ref 0–1.5)
BUN SERPL-MCNC: 9 MG/DL (ref 8–23)
BUN/CREAT SERPL: 8.6 (ref 7–25)
CALCIUM SPEC-SCNC: 8.6 MG/DL (ref 8.6–10.5)
CHLORIDE SERPL-SCNC: 93 MMOL/L (ref 98–107)
CO2 SERPL-SCNC: 30 MMOL/L (ref 22–29)
CREAT SERPL-MCNC: 1.05 MG/DL (ref 0.57–1)
DEPRECATED RDW RBC AUTO: 54.9 FL (ref 37–54)
EGFRCR SERPLBLD CKD-EPI 2021: 57.3 ML/MIN/1.73
EOSINOPHIL # BLD AUTO: 0.17 10*3/MM3 (ref 0–0.4)
EOSINOPHIL NFR BLD AUTO: 1.5 % (ref 0.3–6.2)
ERYTHROCYTE [DISTWIDTH] IN BLOOD BY AUTOMATED COUNT: 17.2 % (ref 12.3–15.4)
GLUCOSE SERPL-MCNC: 115 MG/DL (ref 65–99)
HCT VFR BLD AUTO: 26.3 % (ref 34–46.6)
HGB BLD-MCNC: 8.1 G/DL (ref 12–15.9)
IMM GRANULOCYTES # BLD AUTO: 0.03 10*3/MM3 (ref 0–0.05)
IMM GRANULOCYTES NFR BLD AUTO: 0.3 % (ref 0–0.5)
LYMPHOCYTES # BLD AUTO: 0.82 10*3/MM3 (ref 0.7–3.1)
LYMPHOCYTES NFR BLD AUTO: 7.4 % (ref 19.6–45.3)
MCH RBC QN AUTO: 27 PG (ref 26.6–33)
MCHC RBC AUTO-ENTMCNC: 30.8 G/DL (ref 31.5–35.7)
MCV RBC AUTO: 87.7 FL (ref 79–97)
MONOCYTES # BLD AUTO: 0.29 10*3/MM3 (ref 0.1–0.9)
MONOCYTES NFR BLD AUTO: 2.6 % (ref 5–12)
NEUTROPHILS NFR BLD AUTO: 87.8 % (ref 42.7–76)
NEUTROPHILS NFR BLD AUTO: 9.76 10*3/MM3 (ref 1.7–7)
NRBC BLD AUTO-RTO: 0 /100 WBC (ref 0–0.2)
PLATELET # BLD AUTO: 212 10*3/MM3 (ref 140–450)
PMV BLD AUTO: 10.4 FL (ref 6–12)
POTASSIUM SERPL-SCNC: 3.8 MMOL/L (ref 3.5–5.2)
RBC # BLD AUTO: 3 10*6/MM3 (ref 3.77–5.28)
SODIUM SERPL-SCNC: 136 MMOL/L (ref 136–145)
WBC NRBC COR # BLD AUTO: 11.11 10*3/MM3 (ref 3.4–10.8)

## 2024-05-03 PROCEDURE — 36591 DRAW BLOOD OFF VENOUS DEVICE: CPT

## 2024-05-03 PROCEDURE — 85025 COMPLETE CBC W/AUTO DIFF WBC: CPT | Performed by: HOSPITALIST

## 2024-05-03 PROCEDURE — 36592 COLLECT BLOOD FROM PICC: CPT

## 2024-05-03 PROCEDURE — 80048 BASIC METABOLIC PNL TOTAL CA: CPT | Performed by: HOSPITALIST

## 2024-05-03 RX ORDER — HEPARIN SODIUM (PORCINE) LOCK FLUSH IV SOLN 100 UNIT/ML 100 UNIT/ML
300 SOLUTION INTRAVENOUS ONCE
OUTPATIENT
Start: 2024-05-03

## 2024-05-03 RX ORDER — SODIUM CHLORIDE 0.9 % (FLUSH) 0.9 %
20 SYRINGE (ML) INJECTION AS NEEDED
OUTPATIENT
Start: 2024-05-03

## 2024-05-03 RX ORDER — SODIUM CHLORIDE 0.9 % (FLUSH) 0.9 %
10 SYRINGE (ML) INJECTION AS NEEDED
OUTPATIENT
Start: 2024-05-03

## 2024-05-03 RX ORDER — HEPARIN SODIUM (PORCINE) LOCK FLUSH IV SOLN 100 UNIT/ML 100 UNIT/ML
500 SOLUTION INTRAVENOUS AS NEEDED
OUTPATIENT
Start: 2024-05-03

## 2024-05-10 ENCOUNTER — HOSPITAL ENCOUNTER (OUTPATIENT)
Dept: INFUSION THERAPY | Facility: HOSPITAL | Age: 69
Discharge: HOME | End: 2024-05-10
Payer: MEDICARE

## 2024-05-10 VITALS
OXYGEN SATURATION: 90 % | TEMPERATURE: 98.2 F | SYSTOLIC BLOOD PRESSURE: 149 MMHG | HEART RATE: 93 BPM | RESPIRATION RATE: 20 BRPM | DIASTOLIC BLOOD PRESSURE: 66 MMHG

## 2024-05-10 DIAGNOSIS — A41.81 SEPTICEMIA DUE TO ENTEROCOCCUS: Primary | ICD-10-CM

## 2024-05-10 LAB
ANION GAP SERPL CALCULATED.3IONS-SCNC: 15.5 MMOL/L (ref 5–15)
BASOPHILS # BLD AUTO: 0.06 10*3/MM3 (ref 0–0.2)
BASOPHILS NFR BLD AUTO: 0.5 % (ref 0–1.5)
BUN SERPL-MCNC: 7 MG/DL (ref 8–23)
BUN/CREAT SERPL: 7 (ref 7–25)
CALCIUM SPEC-SCNC: 9.1 MG/DL (ref 8.6–10.5)
CHLORIDE SERPL-SCNC: 92 MMOL/L (ref 98–107)
CO2 SERPL-SCNC: 30.5 MMOL/L (ref 22–29)
CREAT SERPL-MCNC: 1 MG/DL (ref 0.57–1)
DEPRECATED RDW RBC AUTO: 52.8 FL (ref 37–54)
EGFRCR SERPLBLD CKD-EPI 2021: 60.7 ML/MIN/1.73
EOSINOPHIL # BLD AUTO: 0.14 10*3/MM3 (ref 0–0.4)
EOSINOPHIL NFR BLD AUTO: 1.2 % (ref 0.3–6.2)
ERYTHROCYTE [DISTWIDTH] IN BLOOD BY AUTOMATED COUNT: 17.1 % (ref 12.3–15.4)
GLUCOSE SERPL-MCNC: 92 MG/DL (ref 65–99)
HCT VFR BLD AUTO: 27.7 % (ref 34–46.6)
HGB BLD-MCNC: 8.3 G/DL (ref 12–15.9)
IMM GRANULOCYTES # BLD AUTO: 0.06 10*3/MM3 (ref 0–0.05)
IMM GRANULOCYTES NFR BLD AUTO: 0.5 % (ref 0–0.5)
LYMPHOCYTES # BLD AUTO: 0.83 10*3/MM3 (ref 0.7–3.1)
LYMPHOCYTES NFR BLD AUTO: 7 % (ref 19.6–45.3)
MCH RBC QN AUTO: 25.7 PG (ref 26.6–33)
MCHC RBC AUTO-ENTMCNC: 30 G/DL (ref 31.5–35.7)
MCV RBC AUTO: 85.8 FL (ref 79–97)
MONOCYTES # BLD AUTO: 0.62 10*3/MM3 (ref 0.1–0.9)
MONOCYTES NFR BLD AUTO: 5.2 % (ref 5–12)
NEUTROPHILS NFR BLD AUTO: 10.17 10*3/MM3 (ref 1.7–7)
NEUTROPHILS NFR BLD AUTO: 85.6 % (ref 42.7–76)
NRBC BLD AUTO-RTO: 0 /100 WBC (ref 0–0.2)
PLATELET # BLD AUTO: 479 10*3/MM3 (ref 140–450)
PMV BLD AUTO: 9.8 FL (ref 6–12)
POTASSIUM SERPL-SCNC: 3.8 MMOL/L (ref 3.5–5.2)
RBC # BLD AUTO: 3.23 10*6/MM3 (ref 3.77–5.28)
SODIUM SERPL-SCNC: 138 MMOL/L (ref 136–145)
WBC NRBC COR # BLD AUTO: 11.88 10*3/MM3 (ref 3.4–10.8)

## 2024-05-10 PROCEDURE — 85025 COMPLETE CBC W/AUTO DIFF WBC: CPT | Performed by: INTERNAL MEDICINE

## 2024-05-10 PROCEDURE — 36592 COLLECT BLOOD FROM PICC: CPT

## 2024-05-10 PROCEDURE — G0463 HOSPITAL OUTPT CLINIC VISIT: HCPCS

## 2024-05-10 PROCEDURE — 80048 BASIC METABOLIC PNL TOTAL CA: CPT | Performed by: INTERNAL MEDICINE

## 2024-05-10 RX ORDER — SODIUM CHLORIDE 0.9 % (FLUSH) 0.9 %
20 SYRINGE (ML) INJECTION AS NEEDED
OUTPATIENT
Start: 2024-05-17

## 2024-05-10 RX ORDER — SODIUM CHLORIDE 0.9 % (FLUSH) 0.9 %
10 SYRINGE (ML) INJECTION AS NEEDED
OUTPATIENT
Start: 2024-05-17

## 2024-05-10 RX ORDER — HEPARIN SODIUM (PORCINE) LOCK FLUSH IV SOLN 100 UNIT/ML 100 UNIT/ML
500 SOLUTION INTRAVENOUS AS NEEDED
OUTPATIENT
Start: 2024-05-17

## 2024-05-10 RX ORDER — HEPARIN SODIUM (PORCINE) LOCK FLUSH IV SOLN 100 UNIT/ML 100 UNIT/ML
300 SOLUTION INTRAVENOUS ONCE
OUTPATIENT
Start: 2024-05-17

## 2024-05-13 ENCOUNTER — TRANSCRIBE ORDERS (OUTPATIENT)
Dept: ADMINISTRATIVE | Facility: HOSPITAL | Age: 69
End: 2024-05-13
Payer: MEDICARE

## 2024-05-13 ENCOUNTER — TELEPHONE (OUTPATIENT)
Dept: GASTROENTEROLOGY | Facility: CLINIC | Age: 69
End: 2024-05-13
Payer: MEDICARE

## 2024-05-13 ENCOUNTER — OFFICE VISIT (OUTPATIENT)
Dept: INFECTIOUS DISEASES | Facility: CLINIC | Age: 69
End: 2024-05-13
Payer: MEDICARE

## 2024-05-13 VITALS
RESPIRATION RATE: 20 BRPM | TEMPERATURE: 97.3 F | DIASTOLIC BLOOD PRESSURE: 66 MMHG | SYSTOLIC BLOOD PRESSURE: 124 MMHG | OXYGEN SATURATION: 91 %

## 2024-05-13 DIAGNOSIS — A41.81 SEPTICEMIA DUE TO ENTEROCOCCUS: ICD-10-CM

## 2024-05-13 DIAGNOSIS — R91.1 PULMONARY NODULE: Primary | ICD-10-CM

## 2024-05-13 DIAGNOSIS — Z79.2 LONG TERM (CURRENT) USE OF ANTIBIOTICS: ICD-10-CM

## 2024-05-13 DIAGNOSIS — Z95.2 MITRAL VALVE REPLACED: ICD-10-CM

## 2024-05-13 DIAGNOSIS — I05.9 ENDOCARDITIS OF MITRAL VALVE: Primary | ICD-10-CM

## 2024-05-13 PROCEDURE — 3074F SYST BP LT 130 MM HG: CPT | Performed by: INTERNAL MEDICINE

## 2024-05-13 PROCEDURE — 99214 OFFICE O/P EST MOD 30 MIN: CPT | Performed by: INTERNAL MEDICINE

## 2024-05-13 PROCEDURE — 3078F DIAST BP <80 MM HG: CPT | Performed by: INTERNAL MEDICINE

## 2024-05-13 RX ORDER — AMOXICILLIN 500 MG/1
500 CAPSULE ORAL EVERY 8 HOURS
Qty: 90 CAPSULE | Refills: 5 | Status: SHIPPED | OUTPATIENT
Start: 2024-05-13 | End: 2024-06-12

## 2024-05-13 NOTE — PROGRESS NOTES
ID CLINIC NOTE    CC: f/u Enterococcus septicemia with history of mitral valve replacement    History from patient and her  who added helpful history.  He has been her primary caregiver at home.    HPI: Paz Browne is a 68 y.o. female here for f/u Enterococcus septicemia with history of mitral valve replacement.  She has now completed a 6 weeks course of ampicillin and ceftriaxone for presumed endocarditis.  It was not entirely clear if the vegetation seen on her mitral valve was old and chronic versus new and infected.  After weighing the risks and benefits and discussing with her cardiology, we will follow-up it best to treat her with a 6 weeks course of antibiotics for presumed endocarditis.    I last saw her for this problem in the hospital on 4/17/2024.  She did have a brief admission from 4/29 - 5/1/2024 due to epistaxis.  Her Eliquis was placed on hold.  This is per my review of the observation unit discharge summary.    She says overall she is improving.  She has less shortness of breath and is producing less sputum.  She has tolerated ampicillin and ceftriaxone without rash or diarrhea.  No issues with her PICC line.       Past Medical History:   Diagnosis Date    VAN (acute kidney injury)     Anemia     Asthma     Atrial flutter     cardioversion    Cataract     Celiac artery stenosis     Chronic respiratory failure with hypoxia     Colon polyp     COPD (chronic obstructive pulmonary disease)     GI bleed     Hiatal hernia     History of CHF (congestive heart failure)     due to MR    History of home oxygen therapy     3 lpm NC    History of mitral valve replacement with tissue graft     Hyperlipidemia     Hypertension     Infectious viral hepatitis     B    Intertrigo     Long term (current) use of anticoagulants     Mitral regurgitation     s/p tissue MVR    PAF (paroxysmal atrial fibrillation)     s/p MAZE    Pneumonia     Pulmonary hypertension     S/P TVR (tricuspid valve repair) 7/7/2016        Past Surgical History:   Procedure Laterality Date    CARDIAC CATHETERIZATION  09/01/2014    Right dominant systemt, normal coronary arteries.     CARDIAC CATHETERIZATION Left 6/10/2016    Procedure: Cardiac catheterization;  Surgeon: Sergei Hall MD;  Location: [facility] CATH INVASIVE LOCATION;  Service:     CARDIAC CATHETERIZATION N/A 6/10/2016    Procedure: Right Heart Cath;  Surgeon: Sergei Hall MD;  Location: [facility] CATH INVASIVE LOCATION;  Service:     CATARACT EXTRACTION      COLONOSCOPY      COLONOSCOPY N/A 8/4/2017    Procedure: COLONOSCOPY TO CECUM/TI WITH POLYPECTOMY ( COLD BX);  Surgeon: Cleveland Devine MD;  Location: [facility] ENDOSCOPY;  Service:     COLONOSCOPY N/A 8/10/2017    Procedure: COLONOSCOPY to cecum and TI with 2 clips placed at transverse;  Surgeon: Earnest PALOMO MD;  Location: [facility] ENDOSCOPY;  Service:     COLONOSCOPY N/A 12/22/2017    Procedure: COLONOSCOPY INTO CECUM WITH COLD POLYPECTOMIES;  Surgeon: Cleveland Devine MD;  Location: [facility] ENDOSCOPY;  Service:     COLONOSCOPY N/A 5/17/2021    Procedure: COLONOSCOPY to cecum;  Surgeon: Cleveland Devine MD;  Location: [facility] ENDOSCOPY;  Service: Gastroenterology;  Laterality: N/A;  Pre: Fe deficency anemia, h/x of polyps  Post: fair prep, normal    COLONOSCOPY W/ POLYPECTOMY N/A 4/20/2024    Procedure: COLONOSCOPY to cecum with cold and hot snare polypectomies with saline lift and APC;  Surgeon: David Payne MD;  Location: [facility] ENDOSCOPY;  Service: Gastroenterology;  Laterality: N/A;  pre- anemia  post- adequate prep, polyps, avm    ENDOSCOPY N/A 8/17/2017    Procedure: ESOPHAGOGASTRODUODENOSCOPY;  Surgeon: Porsha Ruby MD;  Location: [facility] ENDOSCOPY;  Service:     ENDOSCOPY N/A 12/22/2017    Procedure: ESOPHAGOGASTRODUODENOSCOPY WITH BIOPSIES;  Surgeon: Cleveland Devine MD;  Location: [facility] ENDOSCOPY;  Service:     ENDOSCOPY N/A 5/17/2021    Procedure: ESOPHAGOGASTRODUODENOSCOPY  with biopsies;  Surgeon: Cleveland Devine MD;   Location:  [facility] ENDOSCOPY;  Service: Gastroenterology;  Laterality: N/A;  Pre: Fe deficency anemia, nausea, heme positive stool   Post: gastritis, sloughing of distal esophagus mucosa    ENDOSCOPY N/A 4/19/2024    Procedure: ESOPHAGOGASTRODUODENOSCOPY WITH BIOPSY;  Surgeon: Rick Morales MD;  Location:  [facility] ENDOSCOPY;  Service: Gastroenterology;  Laterality: N/A;  PRE: ANEMIA /  POST: ESOPHAGITIS    GALLBLADDER SURGERY      HEMORRHOIDECTOMY      HYSTERECTOMY      KIDNEY SURGERY  04/22/2013    Stent placement    MAZE PROCEDURE      MITRAL VALVE REPLACEMENT      TONSILLECTOMY      TRICUSPID VALVE REPLACEMENT       Antibiotic allergies:  Cephalexin is listed as causing itching but she tolerates ceftriaxone, cefdinir, ampicillin, and piperacillin-tazobactam    Medications:     Current Outpatient Medications:     albuterol sulfate  (90 Base) MCG/ACT inhaler, Inhale 2 puffs Every 4 (Four) Hours As Needed for Wheezing., Disp: 18 g, Rfl: 3    ampicillin 12 g in sodium chloride 0.9 % 500 mL, Infuse 12 g into a venous catheter Continuous for 23 days. Continuous infusion IV ampicillin 12 g every 24 hours, Disp: , Rfl:     aspirin 81 MG EC tablet, Take 1 tablet by mouth Daily., Disp: 30 tablet, Rfl: 0    atorvastatin (LIPITOR) 40 MG tablet, TAKE 1 TABLET BY MOUTH EVERY DAY (Patient taking differently: Take 1 tablet by mouth Every Other Day.), Disp: 90 tablet, Rfl: 2    budesonide (PULMICORT) 0.5 MG/2ML nebulizer solution, Take 2 mL by nebulization 2 (Two) Times a Day for 30 days. Indications: Chronic Obstructive Lung Disease, Chronic hypoxic respiratory failure, Disp: 360 mL, Rfl: 3    bumetanide (BUMEX) 2 MG tablet, Take 1 tablet by mouth 2 (Two) Times a Day for 30 days., Disp: 60 tablet, Rfl: 0    cefTRIAXone 2,000 mg in sodium chloride 0.9 % 100 mL IVPB, Infuse 2,000 mg into a venous catheter Every 12 (Twelve) Hours for 76 doses. Indications: Bacteria in the Blood, Endocarditis, Disp: , Rfl:      cyclobenzaprine (FLEXERIL) 10 MG tablet, TAKE 1 TABLET BY MOUTH EVERYDAY AT BEDTIME, Disp: 90 tablet, Rfl: 3    dilTIAZem (CARDIZEM) 60 MG tablet, Take 1 tablet by mouth Every 6 (Six) Hours for 30 days., Disp: 120 tablet, Rfl: 0    docosanol (ABREVA) 10 % cream cream, Apply 1 Application topically to the appropriate area as directed 5 (Five) Times a Day., Disp: 2 g, Rfl: 0    HYDROcodone-acetaminophen (NORCO) 7.5-325 MG per tablet, Take 1 tablet by mouth Every 6 (Six) Hours As Needed for Moderate Pain (Chronic pain medicine for low back pain)., Disp: 90 tablet, Rfl: 0    ipratropium-albuterol (DUO-NEB) 0.5-2.5 mg/3 ml nebulizer, Take 3 mL by nebulization Every 4 (Four) Hours As Needed for Wheezing., Disp: 360 mL, Rfl: 3    loperamide (IMODIUM) 2 MG capsule, Take 1 capsule by mouth 4 (Four) Times a Day As Needed for Diarrhea., Disp: 20 capsule, Rfl: 0    loratadine (CLARITIN) 10 MG tablet, Take 1 tablet by mouth 2 (two) times a day., Disp: , Rfl:     Multiple Vitamins-Minerals (MULTIVITAL) tablet, Take 1 tablet by mouth Daily., Disp: , Rfl:     Nebulizer device, 1 Units 4 (Four) Times a Day As Needed (Wheezing). J44.1 j20.8, Disp: 1 each, Rfl: 0    O2 (OXYGEN), Inhale 2 L/min Continuous., Disp: , Rfl:     ondansetron ODT (ZOFRAN-ODT) 4 MG disintegrating tablet, Place 1 tablet on the tongue Every 8 (Eight) Hours As Needed for Nausea or Vomiting., Disp: 10 tablet, Rfl: 0    pantoprazole (PROTONIX) 40 MG EC tablet, Take 1 tablet by mouth 2 (Two) Times a Day for 30 days., Disp: 60 tablet, Rfl: 0    potassium chloride 10 MEQ CR tablet, Take 2 tablets by mouth 3 times a day for 30 days., Disp: 180 tablet, Rfl: 0    roflumilast (DALIRESP) 500 MCG tablet tablet, Take 1 tablet by mouth Daily., Disp: 90 tablet, Rfl: 1    rOPINIRole (REQUIP) 2 MG tablet, TAKE 1 TABLET BY MOUTH EVERY DAY AT NIGHT, Disp: 90 tablet, Rfl: 2    sertraline (ZOLOFT) 100 MG tablet, TAKE 1 TABLET BY MOUTH EVERY DAY, Disp: 90 tablet, Rfl: 1     "spironolactone (ALDACTONE) 25 MG tablet, Take 1 tablet by mouth Daily for 30 days., Disp: 30 tablet, Rfl: 0    Umeclidinium-Vilanterol (Anoro Ellipta) 62.5-25 MCG/ACT aerosol powder  inhaler, Inhale 1 puff Daily., Disp: 60 each, Rfl: 1    zolpidem (AMBIEN) 10 MG tablet, Take 1 tablet by mouth At Night As Needed for Sleep., Disp: 30 tablet, Rfl: 3      OBJECTIVE:  /66   Temp 97.3 °F (36.3 °C)   Resp 20   SpO2 91% Comment: O2 sat on O2 at 4L    General: Awake, alert, very nice, sitting in wheelchair  Eyes: no scleral icterus  Cardiovascular: NR, no murmur  Respiratory: Clear but poor air movement  GI: Soft, not tender or distended  Gu: no Vargas catheter  Vascular: LUE PICC w/o erythema       DIAGNOSTICS:  CBC, BMP, and blood cultures reviewed today  Lab Results   Component Value Date    WBC 11.88 (H) 05/10/2024    HGB 8.3 (L) 05/10/2024    HCT 27.7 (L) 05/10/2024     (H) 05/10/2024     Lab Results   Component Value Date    GLUCOSE 92 05/10/2024    CALCIUM 9.1 05/10/2024     05/10/2024    K 3.8 05/10/2024    CO2 30.5 (H) 05/10/2024    CL 92 (L) 05/10/2024    BUN 7 (L) 05/10/2024    CREATININE 1.00 05/10/2024    EGFRRESULT 50.6 (L) 04/11/2023    EGFR 60.7 05/10/2024    BCR 7.0 05/10/2024    ANIONGAP 15.5 (H) 05/10/2024       Microbiology:  3/31 RPP: Negative  3/31 BCx: Enterococcus faecalis (not VRE) in 2/2 sets (susceptible to ampicillin)  4/1 MRSA nares PCR: Negative  4/2 BCx: Negative, final  4/11 RPP: Negative  4/11 BCx: Negative, final  4/12 respiratory culture: \"Rejected\"  4/13 respiratory culture: \"Rejected\"         Assessment & Plan  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  I " will discontinue her PICC line and intravenous antibiotics today.  I am going to place her on amoxicillin 500 mg p.o. every 8 hours x 6 months as a suppressive course.  Prescription has been sent in.  She and her  expressed understanding of the plan.  RTC 6 months or sooner if needed.

## 2024-05-21 ENCOUNTER — READMISSION MANAGEMENT (OUTPATIENT)
Dept: CALL CENTER | Facility: HOSPITAL | Age: 69
End: 2024-05-21
Payer: MEDICARE

## 2024-05-21 NOTE — OUTREACH NOTE
Medical Week 3 Survey      Flowsheet Row Responses   [facility] patient discharged from? [place]   Does the patient have one of the following disease processes/diagnoses(primary or secondary)? Other   Week 3 attempt successful? Yes   Call start time 1516   Call end time 1518   Discharge diagnosis Epistaxis   Person spoke with today (if not patient) and relationship spouse   Meds reviewed with patient/caregiver? Yes   Is the patient having any side effects they believe may be caused by any medication additions or changes? No   Does the patient have all medications ordered at discharge? Yes   Is the patient taking all medications as directed (includes completed medication regime)? Yes   Does the patient have a primary care provider?  Yes   Does the patient have an appointment with their PCP within 7 days of discharge? Yes   Has the patient kept scheduled appointments due by today? Yes   Has home health visited the patient within 72 hours of discharge? N/A   Psychosocial issues? No   What is the patient's perception of their health status since discharge? Improving   Week 3 Call Completed? Yes   Graduated Yes   Call end time 1518            Vaishali KRAUS - Registered Nurse

## 2024-06-20 ENCOUNTER — HOSPITAL ENCOUNTER (OUTPATIENT)
Dept: CT IMAGING | Facility: HOSPITAL | Age: 69
Discharge: HOME | End: 2024-06-20
Admitting: INTERNAL MEDICINE
Payer: MEDICARE

## 2024-06-20 DIAGNOSIS — R91.1 PULMONARY NODULE: ICD-10-CM

## 2024-06-20 PROCEDURE — 71250 CT THORAX DX C-: CPT

## 2024-07-08 ENCOUNTER — TRANSCRIBE ORDERS (OUTPATIENT)
Dept: ADMINISTRATIVE | Facility: HOSPITAL | Age: 69
End: 2024-07-08
Payer: MEDICARE

## 2024-07-08 DIAGNOSIS — R06.09 DOE (DYSPNEA ON EXERTION): Primary | ICD-10-CM

## 2024-10-09 ENCOUNTER — HOSPITAL ENCOUNTER (OUTPATIENT)
Dept: CT IMAGING | Facility: HOSPITAL | Age: 69
Discharge: HOME | End: 2024-10-09
Admitting: INTERNAL MEDICINE
Payer: MEDICARE

## 2024-10-09 DIAGNOSIS — R06.09 DOE (DYSPNEA ON EXERTION): ICD-10-CM

## 2024-10-09 PROCEDURE — 71250 CT THORAX DX C-: CPT

## 2024-10-28 ENCOUNTER — TRANSCRIBE ORDERS (OUTPATIENT)
Dept: ADMINISTRATIVE | Facility: HOSPITAL | Age: 69
End: 2024-10-28
Payer: MEDICARE

## 2024-10-28 DIAGNOSIS — R91.1 LUNG NODULE: ICD-10-CM

## 2024-10-28 DIAGNOSIS — J96.90 RESPIRATORY FAILURE, UNSPECIFIED CHRONICITY, UNSPECIFIED WHETHER WITH HYPOXIA OR HYPERCAPNIA: Primary | ICD-10-CM

## 2024-10-29 RX ORDER — AMOXICILLIN 500 MG/1
500 CAPSULE ORAL EVERY 8 HOURS
Qty: 90 CAPSULE | Refills: 5 | Status: SHIPPED | OUTPATIENT
Start: 2024-10-29

## 2024-11-15 ENCOUNTER — OFFICE VISIT (OUTPATIENT)
Dept: INFECTIOUS DISEASES | Facility: CLINIC | Age: 69
End: 2024-11-15
Payer: MEDICARE

## 2024-11-15 VITALS
TEMPERATURE: 97.7 F | RESPIRATION RATE: 16 BRPM | SYSTOLIC BLOOD PRESSURE: 140 MMHG | DIASTOLIC BLOOD PRESSURE: 70 MMHG | HEART RATE: 72 BPM | OXYGEN SATURATION: 98 %

## 2024-11-15 DIAGNOSIS — I05.9 ENDOCARDITIS OF MITRAL VALVE: Primary | ICD-10-CM

## 2024-11-15 DIAGNOSIS — A41.81 SEPTICEMIA DUE TO ENTEROCOCCUS: ICD-10-CM

## 2024-11-15 DIAGNOSIS — Z79.2 LONG TERM (CURRENT) USE OF ANTIBIOTICS: ICD-10-CM

## 2024-11-15 DIAGNOSIS — Z95.2 MITRAL VALVE REPLACED: ICD-10-CM

## 2024-11-15 PROCEDURE — 99214 OFFICE O/P EST MOD 30 MIN: CPT | Performed by: INTERNAL MEDICINE

## 2024-11-15 PROCEDURE — 3078F DIAST BP <80 MM HG: CPT | Performed by: INTERNAL MEDICINE

## 2024-11-15 PROCEDURE — 3077F SYST BP >= 140 MM HG: CPT | Performed by: INTERNAL MEDICINE

## 2024-11-15 RX ORDER — AMOXICILLIN 500 MG/1
500 CAPSULE ORAL EVERY 8 HOURS
Qty: 270 CAPSULE | Refills: 1 | Status: SHIPPED | OUTPATIENT
Start: 2024-11-15 | End: 2025-02-13

## 2024-11-15 NOTE — PROGRESS NOTES
ID CLINIC NOTE    CC: f/u Enterococcus septicemia with history of mitral valve replacement    History from patient and her  (her caregiver) who provided most of the history.    HPI: Paz Browne is a 69 y.o. female here for f/u Enterococcus septicemia with history of mitral valve replacement.  I last saw her for this problem in May 2024.  At that visit,   she had completed a 6 weeks course of ampicillin and ceftriaxone for presumed endocarditis.  It was not entirely clear if the vegetation seen on her mitral valve was old and chronic versus new and infected.  After weighing the risks and benefits and discussing with her cardiologist, we decided it would be best to treat her with a 6 weeks course of antibiotics for presumed endocarditis.    At her clinic visit in May 2024 when she finished her course of intravenous antibiotics, I transitioned her to chronic suppressive amoxicillin 500 mg p.o. every 8 hours for 6 weeks course and plans for follow-up today.    She says that she has been tolerating the amoxicillin without any side effects.  No issues with her heart valve.    She continues to follow with pulmonary for her chronic hypoxic respiratory failure for which she is on 2 L.  She reports chronic, intermittent sputum production.  No fevers, chills, or sweats.    Past Medical History:   Diagnosis Date    VAN (acute kidney injury)     Anemia     Asthma     Atrial flutter     cardioversion    Cataract     Celiac artery stenosis     Chronic respiratory failure with hypoxia     Colon polyp     COPD (chronic obstructive pulmonary disease)     GI bleed     Hiatal hernia     History of CHF (congestive heart failure)     due to MR    History of home oxygen therapy     3 lpm [place]    History of mitral valve replacement with tissue graft     Hyperlipidemia     Hypertension     Infectious viral hepatitis     B    Intertrigo     Long term (current) use of anticoagulants     Mitral regurgitation     s/p tissue MVR    PAF  (paroxysmal atrial fibrillation)     s/p MAZE    Pneumonia     Pulmonary hypertension     S/P TVR (tricuspid valve repair) 7/7/2016       Past Surgical History:   Procedure Laterality Date    CARDIAC CATHETERIZATION  09/01/2014    Right dominant systemt, normal coronary arteries.     CARDIAC CATHETERIZATION Left 6/10/2016    Procedure: Cardiac catheterization;  Surgeon: Sergei Hall MD;  Location: [facility] CATH INVASIVE LOCATION;  Service:     CARDIAC CATHETERIZATION N/A 6/10/2016    Procedure: Right Heart Cath;  Surgeon: Sergei Hall MD;  Location: [facility] CATH INVASIVE LOCATION;  Service:     CATARACT EXTRACTION      COLONOSCOPY      COLONOSCOPY N/A 8/4/2017    Procedure: COLONOSCOPY TO CECUM/TI WITH POLYPECTOMY ( COLD BX);  Surgeon: Cleveland Devine MD;  Location: [facility] ENDOSCOPY;  Service:     COLONOSCOPY N/A 8/10/2017    Procedure: COLONOSCOPY to cecum and TI with 2 clips placed at transverse;  Surgeon: Earnest PALOMO MD;  Location: [facility] ENDOSCOPY;  Service:     COLONOSCOPY N/A 12/22/2017    Procedure: COLONOSCOPY INTO CECUM WITH COLD POLYPECTOMIES;  Surgeon: Cleveland Devine MD;  Location: [facility] ENDOSCOPY;  Service:     COLONOSCOPY N/A 5/17/2021    Procedure: COLONOSCOPY to cecum;  Surgeon: Cleveland Devine MD;  Location: [facility] ENDOSCOPY;  Service: Gastroenterology;  Laterality: N/A;  Pre: Fe deficency anemia, h/x of polyps  Post: fair prep, normal    COLONOSCOPY W/ POLYPECTOMY N/A 4/20/2024    Procedure: COLONOSCOPY to cecum with cold and hot snare polypectomies with saline lift and APC;  Surgeon: David Payne MD;  Location: [facility] ENDOSCOPY;  Service: Gastroenterology;  Laterality: N/A;  pre- anemia  post- adequate prep, polyps, avm    ENDOSCOPY N/A 8/17/2017    Procedure: ESOPHAGOGASTRODUODENOSCOPY;  Surgeon: Porsha Ruby MD;  Location: [facility] ENDOSCOPY;  Service:     ENDOSCOPY N/A 12/22/2017    Procedure: ESOPHAGOGASTRODUODENOSCOPY WITH BIOPSIES;  Surgeon: Cleveland Devine MD;  Location: [facility];   Service:     ENDOSCOPY N/A 5/17/2021    Procedure: ESOPHAGOGASTRODUODENOSCOPY  with biopsies;  Surgeon: Cleveland Devine MD;  Location:  [facility] ENDOSCOPY;  Service: Gastroenterology;  Laterality: N/A;  Pre: Fe deficency anemia, nausea, heme positive stool   Post: gastritis, sloughing of distal esophagus mucosa    ENDOSCOPY N/A 4/19/2024    Procedure: ESOPHAGOGASTRODUODENOSCOPY WITH BIOPSY;  Surgeon: Rick Morales MD;  Location:  [facility] ENDOSCOPY;  Service: Gastroenterology;  Laterality: N/A;  PRE: ANEMIA /  POST: ESOPHAGITIS    GALLBLADDER SURGERY      HEMORRHOIDECTOMY      HYSTERECTOMY      KIDNEY SURGERY  04/22/2013    Stent placement    MAZE PROCEDURE      MITRAL VALVE REPLACEMENT      TONSILLECTOMY      TRICUSPID VALVE REPLACEMENT       Antibiotic allergies:  Cephalexin is listed as causing itching but she tolerates ceftriaxone, cefdinir, ampicillin, and piperacillin-tazobactam    Medications:     Current Outpatient Medications:     albuterol sulfate  (90 Base) MCG/ACT inhaler, Inhale 2 puffs Every 4 (Four) Hours As Needed for Wheezing., Disp: 18 g, Rfl: 3    amoxicillin (AMOXIL) 500 MG capsule, TAKE 1 CAPSULE BY MOUTH EVERY 8 HOURS, Disp: 90 capsule, Rfl: 5    aspirin 81 MG EC tablet, Take 1 tablet by mouth Daily., Disp: 30 tablet, Rfl: 0    atorvastatin (LIPITOR) 40 MG tablet, TAKE 1 TABLET BY MOUTH EVERY DAY (Patient taking differently: Take 1 tablet by mouth Every Other Day.), Disp: 90 tablet, Rfl: 2    budesonide (PULMICORT) 0.5 MG/2ML nebulizer solution, Take 2 mL by nebulization 2 (Two) Times a Day for 30 days. Indications: Chronic Obstructive Lung Disease, Chronic hypoxic respiratory failure, Disp: 360 mL, Rfl: 3    bumetanide (BUMEX) 2 MG tablet, Take 1 tablet by mouth 2 (Two) Times a Day for 30 days., Disp: 60 tablet, Rfl: 0    cyclobenzaprine (FLEXERIL) 10 MG tablet, TAKE 1 TABLET BY MOUTH EVERYDAY AT BEDTIME, Disp: 90 tablet, Rfl: 3    dilTIAZem (CARDIZEM) 60 MG tablet, Take 1 tablet by  mouth Every 6 (Six) Hours for 30 days., Disp: 120 tablet, Rfl: 0    docosanol (ABREVA) 10 % cream cream, Apply 1 Application topically to the appropriate area as directed 5 (Five) Times a Day., Disp: 2 g, Rfl: 0    HYDROcodone-acetaminophen (NORCO) 7.5-325 MG per tablet, Take 1 tablet by mouth Every 6 (Six) Hours As Needed for Moderate Pain (Chronic pain medicine for low back pain)., Disp: 90 tablet, Rfl: 0    ipratropium-albuterol (DUO-NEB) 0.5-2.5 mg/3 ml nebulizer, Take 3 mL by nebulization Every 4 (Four) Hours As Needed for Wheezing., Disp: 360 mL, Rfl: 3    loperamide (IMODIUM) 2 MG capsule, Take 1 capsule by mouth 4 (Four) Times a Day As Needed for Diarrhea., Disp: 20 capsule, Rfl: 0    loratadine (CLARITIN) 10 MG tablet, Take 1 tablet by mouth 2 (two) times a day., Disp: , Rfl:     Multiple Vitamins-Minerals (MULTIVITAL) tablet, Take 1 tablet by mouth Daily., Disp: , Rfl:     Nebulizer device, 1 Units 4 (Four) Times a Day As Needed (Wheezing). J44.1 j20.8, Disp: 1 each, Rfl: 0    O2 (OXYGEN), Inhale 2 L/min Continuous., Disp: , Rfl:     ondansetron ODT (ZOFRAN-ODT) 4 MG disintegrating tablet, Place 1 tablet on the tongue Every 8 (Eight) Hours As Needed for Nausea or Vomiting., Disp: 10 tablet, Rfl: 0    roflumilast (DALIRESP) 500 MCG tablet tablet, Take 1 tablet by mouth Daily., Disp: 90 tablet, Rfl: 1    rOPINIRole (REQUIP) 2 MG tablet, TAKE 1 TABLET BY MOUTH EVERY DAY AT NIGHT, Disp: 90 tablet, Rfl: 2    sertraline (ZOLOFT) 100 MG tablet, TAKE 1 TABLET BY MOUTH EVERY DAY, Disp: 90 tablet, Rfl: 1    spironolactone (ALDACTONE) 25 MG tablet, Take 1 tablet by mouth Daily for 30 days., Disp: 30 tablet, Rfl: 0    Umeclidinium-Vilanterol (Anoro Ellipta) 62.5-25 MCG/ACT aerosol powder  inhaler, Inhale 1 puff Daily., Disp: 60 each, Rfl: 1    zolpidem (AMBIEN) 10 MG tablet, Take 1 tablet by mouth At Night As Needed for Sleep., Disp: 30 tablet, Rfl: 3      OBJECTIVE:  /70   Pulse 72   Temp 97.7 °F (36.5 °C)    "Resp 16   SpO2 98% Comment: on O2 continuous at 2L      General: Awake, alert, very nice, sitting in wheelchair  Eyes: no scleral icterus  Cardiovascular: NR, no murmur  Respiratory: Clear but poor air movement; no wheezing  GI: Soft, not tender or distended  Gu: no Vargas catheter       DIAGNOSTICS:  CBC, CMP, and lipids reviewed today   Lab Results   Component Value Date    WBC 8.3 10/22/2024    HGB 10.9 (L) 10/22/2024    HCT 32.8 (L) 10/22/2024     10/22/2024     Creatinine 0.9  AST 26  ALT 18  Triglycerides 93  Total cholesterol 184  HDL 72  LDL 93      Microbiology:  3/31/24 RPP: Negative  3/31 BCx: Enterococcus faecalis (not VRE) in 2/2 sets (susceptible to ampicillin)  4/1 MRSA nares PCR: Negative  4/2 BCx: Negative, final  4/11 RPP: Negative  4/11 BCx: Negative, final  4/12 respiratory culture: \"Rejected\"  4/13/24 respiratory culture: \"Rejected\"         Assessment & Plan  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      We discussed benefits versus risks of continuing suppressive therapy.  I recommended that she continue it for 6 additional months.  Rx sent in.  Amoxicillin 500 mg p.o. every 8 hours.    Labs reviewed.  RTC 6 months.    "

## 2024-12-23 RX ORDER — AMOXICILLIN 500 MG/1
500 CAPSULE ORAL EVERY 8 HOURS
Qty: 270 CAPSULE | Refills: 1 | OUTPATIENT
Start: 2024-12-23 | End: 2025-03-23

## 2024-12-23 NOTE — TELEPHONE ENCOUNTER
Since Dr. Clayton had sent in a prescription for Amoxil 11/15/24 I called [facility] & spoke w/ Tiffany in pharmacy. She reviewed patient's chart & said they actually did have the recent Amoxil Rx and she will contact patient to let her know. Tiffany denies need for the refill at this time.

## 2025-04-15 ENCOUNTER — HOSPITAL ENCOUNTER (OUTPATIENT)
Dept: CT IMAGING | Facility: HOSPITAL | Age: 70
Discharge: HOME | End: 2025-04-15
Admitting: INTERNAL MEDICINE
Payer: MEDICARE

## 2025-04-15 DIAGNOSIS — J96.90 RESPIRATORY FAILURE, UNSPECIFIED CHRONICITY, UNSPECIFIED WHETHER WITH HYPOXIA OR HYPERCAPNIA: ICD-10-CM

## 2025-04-15 DIAGNOSIS — R91.1 LUNG NODULE: ICD-10-CM

## 2025-04-15 PROCEDURE — 71250 CT THORAX DX C-: CPT

## 2025-05-23 ENCOUNTER — OFFICE VISIT (OUTPATIENT)
Dept: INFECTIOUS DISEASES | Facility: CLINIC | Age: 70
End: 2025-05-23
Payer: MEDICARE

## 2025-05-23 VITALS
DIASTOLIC BLOOD PRESSURE: 71 MMHG | RESPIRATION RATE: 14 BRPM | HEART RATE: 80 BPM | SYSTOLIC BLOOD PRESSURE: 168 MMHG | TEMPERATURE: 98.7 F | WEIGHT: 113.8 LBS | BODY MASS INDEX: 17.82 KG/M2

## 2025-05-23 DIAGNOSIS — Z79.2 LONG TERM (CURRENT) USE OF ANTIBIOTICS: ICD-10-CM

## 2025-05-23 DIAGNOSIS — I05.9 ENDOCARDITIS OF MITRAL VALVE: Primary | ICD-10-CM

## 2025-05-23 DIAGNOSIS — Z95.2 MITRAL VALVE REPLACED: ICD-10-CM

## 2025-05-23 DIAGNOSIS — A41.81 SEPTICEMIA DUE TO ENTEROCOCCUS: ICD-10-CM

## 2025-05-23 RX ORDER — AMOXICILLIN 500 MG/1
1000 CAPSULE ORAL 3 TIMES DAILY
Qty: 540 CAPSULE | Refills: 3 | Status: SHIPPED | OUTPATIENT
Start: 2025-05-23 | End: 2025-08-21

## 2025-05-23 RX ORDER — AMOXICILLIN 500 MG/1
1000 CAPSULE ORAL 3 TIMES DAILY
COMMUNITY
End: 2025-05-23 | Stop reason: SDUPTHER

## 2025-05-23 NOTE — PROGRESS NOTES
ID CLINIC NOTE    CC: f/u Enterococcus septicemia with history of mitral valve replacement    History from patient and her  (her caregiver) who provided most of the history.    HPI: Paz Browne is a 69 y.o. female here for f/u Enterococcus septicemia with history of mitral valve replacement.  In May 2024, she completed a 6-week course of intravenous ampicillin and ceftriaxone.  It was not entirely clear if the vegetation seen on her mitral valve was old and chronic versus new and infected.  After weighing the risks and benefits and discussing with her cardiologist, we decided it would be best to treat her with a 6 weeks course of antibiotics for presumed endocarditis.    For the past 12 months, she has been on chronic suppressive amoxicillin 500 mg p.o. every 8 hours.  No side effects.  No missed doses.  No fevers, chills, or sweats.  She would prefer to stay on it.    She continues to deal with multiple medical problems including chronic hypoxic respiratory failure on 3 L nasal cannula.  Thankfully she has been able to stay out of the hospital the past year.  She follows with her PCP at [facility] regularly.  I was able to review recent labs from April 2025.    Past Medical History:   Diagnosis Date    VAN (acute kidney injury)     Anemia     Asthma     Atrial flutter     cardioversion    Cataract     Celiac artery stenosis     Chronic respiratory failure with hypoxia     Colon polyp     COPD (chronic obstructive pulmonary disease)     GI bleed     Hiatal hernia     History of CHF (congestive heart failure)     due to MR    History of home oxygen therapy     3 lpm NC    History of mitral valve replacement with tissue graft     Hyperlipidemia     Hypertension     Infectious viral hepatitis     B    Intertrigo     Long term (current) use of anticoagulants     Mitral regurgitation     s/p tissue MVR    PAF (paroxysmal atrial fibrillation)     s/p MAZE    Pneumonia     Pulmonary hypertension     S/P TVR  (tricuspid valve repair) 7/7/2016       Past Surgical History:   Procedure Laterality Date    CARDIAC CATHETERIZATION  09/01/2014    Right dominant systemt, normal coronary arteries.     CARDIAC CATHETERIZATION Left 6/10/2016    Procedure: Cardiac catheterization;  Surgeon: Sergei Hall MD;  Location: [facility] CATH INVASIVE LOCATION;  Service:     CARDIAC CATHETERIZATION N/A 6/10/2016    Procedure: Right Heart Cath;  Surgeon: Sergei Hall MD;  Location: [facility] CATH INVASIVE LOCATION;  Service:     CATARACT EXTRACTION      COLONOSCOPY      COLONOSCOPY N/A 8/4/2017    Procedure: COLONOSCOPY TO CECUM/TI WITH POLYPECTOMY ( COLD BX);  Surgeon: Cleveland Devine MD;  Location: [facility] ENDOSCOPY;  Service:     COLONOSCOPY N/A 8/10/2017    Procedure: COLONOSCOPY to cecum and TI with 2 clips placed at transverse;  Surgeon: Earnest PALOMO MD;  Location: [facility] ENDOSCOPY;  Service:     COLONOSCOPY N/A 12/22/2017    Procedure: COLONOSCOPY INTO CECUM WITH COLD POLYPECTOMIES;  Surgeon: Cleveland Devine MD;  Location: [facility] ENDOSCOPY;  Service:     COLONOSCOPY N/A 5/17/2021    Procedure: COLONOSCOPY to cecum;  Surgeon: Cleveland Devine MD;  Location: [facility] ENDOSCOPY;  Service: Gastroenterology;  Laterality: N/A;  Pre: Fe deficency anemia, h/x of polyps  Post: fair prep, normal    COLONOSCOPY W/ POLYPECTOMY N/A 4/20/2024    Procedure: COLONOSCOPY to cecum with cold and hot snare polypectomies with saline lift and APC;  Surgeon: David Payne MD;  Location: [facility] ENDOSCOPY;  Service: Gastroenterology;  Laterality: N/A;  pre- anemia  post- adequate prep, polyps, avm    ENDOSCOPY N/A 8/17/2017    Procedure: ESOPHAGOGASTRODUODENOSCOPY;  Surgeon: Porsha Ruby MD;  Location: [facility] ENDOSCOPY;  Service:     ENDOSCOPY N/A 12/22/2017    Procedure: ESOPHAGOGASTRODUODENOSCOPY WITH BIOPSIES;  Surgeon: Cleveland Devine MD;  Location: [facility] ENDOSCOPY;  Service:     ENDOSCOPY N/A 5/17/2021    Procedure: ESOPHAGOGASTRODUODENOSCOPY  with biopsies;   Surgeon: Cleveland Devine MD;  Location:  [facility] ENDOSCOPY;  Service: Gastroenterology;  Laterality: N/A;  Pre: Fe deficency anemia, nausea, heme positive stool   Post: gastritis, sloughing of distal esophagus mucosa    ENDOSCOPY N/A 4/19/2024    Procedure: ESOPHAGOGASTRODUODENOSCOPY WITH BIOPSY;  Surgeon: Rick Morales MD;  Location:  [facility] ENDOSCOPY;  Service: Gastroenterology;  Laterality: N/A;  PRE: ANEMIA /  POST: ESOPHAGITIS    GALLBLADDER SURGERY      HEMORRHOIDECTOMY      HYSTERECTOMY      KIDNEY SURGERY  04/22/2013    Stent placement    MAZE PROCEDURE      MITRAL VALVE REPLACEMENT      TONSILLECTOMY      TRICUSPID VALVE REPLACEMENT       Antibiotic allergies:  Cephalexin is listed as causing itching but she tolerates ceftriaxone, cefdinir, ampicillin, and piperacillin-tazobactam    Medications:     Current Outpatient Medications:     albuterol sulfate  (90 Base) MCG/ACT inhaler, Inhale 2 puffs Every 4 (Four) Hours As Needed for Wheezing., Disp: 18 g, Rfl: 3    amoxicillin (AMOXIL) 500 MG capsule, Take 2 capsules by mouth 3 (Three) Times a Day., Disp: , Rfl:     aspirin 81 MG EC tablet, Take 1 tablet by mouth Daily., Disp: 30 tablet, Rfl: 0    atorvastatin (LIPITOR) 40 MG tablet, TAKE 1 TABLET BY MOUTH EVERY DAY (Patient taking differently: Take 1 tablet by mouth Every Other Day.), Disp: 90 tablet, Rfl: 2    cyclobenzaprine (FLEXERIL) 10 MG tablet, TAKE 1 TABLET BY MOUTH EVERYDAY AT BEDTIME, Disp: 90 tablet, Rfl: 3    docosanol (ABREVA) 10 % cream cream, Apply 1 Application topically to the appropriate area as directed 5 (Five) Times a Day., Disp: 2 g, Rfl: 0    HYDROcodone-acetaminophen (NORCO) 7.5-325 MG per tablet, Take 1 tablet by mouth Every 6 (Six) Hours As Needed for Moderate Pain (Chronic pain medicine for low back pain)., Disp: 90 tablet, Rfl: 0    ipratropium-albuterol (DUO-NEB) 0.5-2.5 mg/3 ml nebulizer, Take 3 mL by nebulization Every 4 (Four) Hours As Needed for Wheezing., Disp: 360  mL, Rfl: 3    loperamide (IMODIUM) 2 MG capsule, Take 1 capsule by mouth 4 (Four) Times a Day As Needed for Diarrhea., Disp: 20 capsule, Rfl: 0    loratadine (CLARITIN) 10 MG tablet, Take 1 tablet by mouth 2 (two) times a day., Disp: , Rfl:     Multiple Vitamins-Minerals (MULTIVITAL) tablet, Take 1 tablet by mouth Daily., Disp: , Rfl:     Nebulizer device, 1 Units 4 (Four) Times a Day As Needed (Wheezing). J44.1 j20.8, Disp: 1 each, Rfl: 0    O2 (OXYGEN), Inhale 2 L/min Continuous., Disp: , Rfl:     ondansetron ODT (ZOFRAN-ODT) 4 MG disintegrating tablet, Place 1 tablet on the tongue Every 8 (Eight) Hours As Needed for Nausea or Vomiting., Disp: 10 tablet, Rfl: 0    roflumilast (DALIRESP) 500 MCG tablet tablet, Take 1 tablet by mouth Daily., Disp: 90 tablet, Rfl: 1    rOPINIRole (REQUIP) 2 MG tablet, TAKE 1 TABLET BY MOUTH EVERY DAY AT NIGHT, Disp: 90 tablet, Rfl: 2    sertraline (ZOLOFT) 100 MG tablet, TAKE 1 TABLET BY MOUTH EVERY DAY, Disp: 90 tablet, Rfl: 1    zolpidem (AMBIEN) 10 MG tablet, Take 1 tablet by mouth At Night As Needed for Sleep., Disp: 30 tablet, Rfl: 3    budesonide (PULMICORT) 0.5 MG/2ML nebulizer solution, Take 2 mL by nebulization 2 (Two) Times a Day for 30 days. Indications: Chronic Obstructive Lung Disease, Chronic hypoxic respiratory failure, Disp: 360 mL, Rfl: 3    bumetanide (BUMEX) 2 MG tablet, Take 1 tablet by mouth 2 (Two) Times a Day for 30 days., Disp: 60 tablet, Rfl: 0    dilTIAZem (CARDIZEM) 60 MG tablet, Take 1 tablet by mouth Every 6 (Six) Hours for 30 days., Disp: 120 tablet, Rfl: 0    spironolactone (ALDACTONE) 25 MG tablet, Take 1 tablet by mouth Daily for 30 days., Disp: 30 tablet, Rfl: 0      OBJECTIVE:  /71   Pulse 80   Temp 98.7 °F (37.1 °C)   Resp 14   Wt 51.6 kg (113 lb 12.8 oz)   BMI 17.82 kg/m²     General: Awake, alert, very nice, sitting in wheelchair  Eyes: no scleral icterus  Cardiovascular: NR, no murmur  Respiratory: Clear but poor air movement; no  wheezing; on 3L NC  : no Vargas catheter  Skin: many bruises; no rashes       DIAGNOSTICS:  4/22/25 CBC, CMP and Mg reviewed today    WBC 9.3  Hct 32  Plt 204    Mg 2.2    Crt 0.9  K 4.1  AST 25  ALT 16      Microbiology:  3/31/24 RPP: Negative  3/31 BCx: Enterococcus faecalis (not VRE) in 2/2 sets (susceptible to ampicillin)  4/1 MRSA nares PCR: Negative  4/2 BCx: Negative, final  4/11 RPP: Negative  4/11 BCx: Negative, final      Assessment & Plan  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      We discussed benefits versus risks of continuing suppressive therapy.  She would prefer to stay on amoxicillin therapy and I think that is very reasonable.  Especially given her frail medical status.    Rx sent in for amoxicillin 500 mg p.o. every 8 hours x 90 days with 3 refills    Labs reviewed as above.      RTC 1 year

## 2025-06-22 ENCOUNTER — HOSPITAL ENCOUNTER (EMERGENCY)
Facility: HOSPITAL | Age: 70
Discharge: HOME | End: 2025-06-22
Attending: EMERGENCY MEDICINE | Admitting: EMERGENCY MEDICINE
Payer: MEDICARE

## 2025-06-22 VITALS
TEMPERATURE: 98.1 F | RESPIRATION RATE: 20 BRPM | DIASTOLIC BLOOD PRESSURE: 56 MMHG | SYSTOLIC BLOOD PRESSURE: 138 MMHG | WEIGHT: 123.46 LBS | HEART RATE: 79 BPM | HEIGHT: 67 IN | OXYGEN SATURATION: 91 % | BODY MASS INDEX: 19.38 KG/M2

## 2025-06-22 DIAGNOSIS — R04.0 ANTERIOR EPISTAXIS: Primary | ICD-10-CM

## 2025-06-22 LAB
ALBUMIN SERPL-MCNC: 4.5 G/DL (ref 3.5–5.2)
ALBUMIN/GLOB SERPL: 2.3 G/DL
ALP SERPL-CCNC: 104 U/L (ref 39–117)
ALT SERPL W P-5'-P-CCNC: 17 U/L (ref 1–33)
ANION GAP SERPL CALCULATED.3IONS-SCNC: 12.2 MMOL/L (ref 5–15)
AST SERPL-CCNC: 24 U/L (ref 1–32)
BASOPHILS # BLD AUTO: 0.03 10*3/MM3 (ref 0–0.2)
BASOPHILS NFR BLD AUTO: 0.3 % (ref 0–1.5)
BILIRUB SERPL-MCNC: 0.5 MG/DL (ref 0–1.2)
BUN SERPL-MCNC: 15.5 MG/DL (ref 8–23)
BUN/CREAT SERPL: 14.9 (ref 7–25)
CALCIUM SPEC-SCNC: 9.4 MG/DL (ref 8.6–10.5)
CHLORIDE SERPL-SCNC: 97 MMOL/L (ref 98–107)
CO2 SERPL-SCNC: 28.8 MMOL/L (ref 22–29)
CREAT SERPL-MCNC: 1.04 MG/DL (ref 0.57–1)
DEPRECATED RDW RBC AUTO: 42.8 FL (ref 37–54)
EGFRCR SERPLBLD CKD-EPI 2021: 57.2 ML/MIN/1.73
EOSINOPHIL # BLD AUTO: 0.1 10*3/MM3 (ref 0–0.4)
EOSINOPHIL NFR BLD AUTO: 0.9 % (ref 0.3–6.2)
ERYTHROCYTE [DISTWIDTH] IN BLOOD BY AUTOMATED COUNT: 13.8 % (ref 12.3–15.4)
GLOBULIN UR ELPH-MCNC: 2 GM/DL
GLUCOSE SERPL-MCNC: 118 MG/DL (ref 65–99)
HCT VFR BLD AUTO: 31.7 % (ref 34–46.6)
HGB BLD-MCNC: 10.2 G/DL (ref 12–15.9)
HOLD SPECIMEN: NORMAL
IMM GRANULOCYTES # BLD AUTO: 0.05 10*3/MM3 (ref 0–0.05)
IMM GRANULOCYTES NFR BLD AUTO: 0.4 % (ref 0–0.5)
LYMPHOCYTES # BLD AUTO: 0.61 10*3/MM3 (ref 0.7–3.1)
LYMPHOCYTES NFR BLD AUTO: 5.4 % (ref 19.6–45.3)
MCH RBC QN AUTO: 27.5 PG (ref 26.6–33)
MCHC RBC AUTO-ENTMCNC: 32.2 G/DL (ref 31.5–35.7)
MCV RBC AUTO: 85.4 FL (ref 79–97)
MONOCYTES # BLD AUTO: 0.34 10*3/MM3 (ref 0.1–0.9)
MONOCYTES NFR BLD AUTO: 3 % (ref 5–12)
NEUTROPHILS NFR BLD AUTO: 10.13 10*3/MM3 (ref 1.7–7)
NEUTROPHILS NFR BLD AUTO: 90 % (ref 42.7–76)
NRBC BLD AUTO-RTO: 0 /100 WBC (ref 0–0.2)
PLATELET # BLD AUTO: 184 10*3/MM3 (ref 140–450)
PMV BLD AUTO: 10.5 FL (ref 6–12)
POTASSIUM SERPL-SCNC: 4.5 MMOL/L (ref 3.5–5.2)
PROT SERPL-MCNC: 6.5 G/DL (ref 6–8.5)
RBC # BLD AUTO: 3.71 10*6/MM3 (ref 3.77–5.28)
SODIUM SERPL-SCNC: 138 MMOL/L (ref 136–145)
WBC NRBC COR # BLD AUTO: 11.26 10*3/MM3 (ref 3.4–10.8)
WHOLE BLOOD HOLD COAG: NORMAL
WHOLE BLOOD HOLD SPECIMEN: NORMAL

## 2025-06-22 PROCEDURE — 99283 EMERGENCY DEPT VISIT LOW MDM: CPT

## 2025-06-22 PROCEDURE — 85025 COMPLETE CBC W/AUTO DIFF WBC: CPT | Performed by: EMERGENCY MEDICINE

## 2025-06-22 PROCEDURE — 36415 COLL VENOUS BLD VENIPUNCTURE: CPT

## 2025-06-22 PROCEDURE — 80053 COMPREHEN METABOLIC PANEL: CPT | Performed by: EMERGENCY MEDICINE

## 2025-06-22 RX ADMIN — PHENYLEPHRINE HYDROCHLORIDE 2 SPRAY: 0.5 SPRAY NASAL at 05:21

## 2025-06-22 NOTE — ED NOTES
Patient arrived via ems from home. Patient currently has a nose bleed that has been ongoing for the past 2 hours. Patient states she takes a baby aspirin. Patient has a history of nose bleeds but patient states it hasn't been bleeding for this long before

## 2025-06-22 NOTE — ED PROVIDER NOTES
Time: 4:55 AM EDT  Date of encounter:  6/22/2025  Independent Historian/Clinical History and Information was obtained by:   Patient    History is limited by: N/A    Chief Complaint: Epistaxis      History of Present Illness:  Patient is a 70 y.o. year old female who presents to the emergency department for evaluation of epistaxis x 1 day.  She reports she went to blow her nose earlier and it began bleeding.  Denies any trauma to the nose.  Reports she is on a baby aspirin.  Denies any syncope.      Patient Care Team  Primary Care Provider: Javan Martinez MD    Past Medical History:     Allergies   Allergen Reactions    Flexeril [Cyclobenzaprine] Unknown - High Severity    Bupropion Itching    Cephalexin Itching     Tolerated piperacillin/tazobactam, ampicillin, cefdinir, and ceftriaxone    Metoprolol Itching     Past Medical History:   Diagnosis Date    VAN (acute kidney injury)     Anemia     Asthma     Atrial flutter     cardioversion    Cataract     Celiac artery stenosis     Chronic respiratory failure with hypoxia     Colon polyp     COPD (chronic obstructive pulmonary disease)     GI bleed     Hiatal hernia     History of CHF (congestive heart failure)     due to MR    History of home oxygen therapy     3 lpm NC    History of mitral valve replacement with tissue graft     Hyperlipidemia     Hypertension     Infectious viral hepatitis     B    Intertrigo     Long term (current) use of anticoagulants     Mitral regurgitation     s/p tissue MVR    PAF (paroxysmal atrial fibrillation)     s/p MAZE    Pneumonia     Pulmonary hypertension     S/P TVR (tricuspid valve repair) 7/7/2016     Past Surgical History:   Procedure Laterality Date    CARDIAC CATHETERIZATION  09/01/2014    Right dominant systemt, normal coronary arteries.     CARDIAC CATHETERIZATION Left 6/10/2016    Procedure: Cardiac catheterization;  Surgeon: Sergei Hall MD;  Location: [facility] CATH INVASIVE LOCATION;  Service:     CARDIAC CATHETERIZATION  N/A 6/10/2016    Procedure: Right Heart Cath;  Surgeon: Sergei Hall MD;  Location: [facility] CATH INVASIVE LOCATION;  Service:     CATARACT EXTRACTION      COLONOSCOPY      COLONOSCOPY N/A 8/4/2017    Procedure: COLONOSCOPY TO CECUM/TI WITH POLYPECTOMY ( COLD BX);  Surgeon: Cleveland Devine MD;  Location: [facility] ENDOSCOPY;  Service:     COLONOSCOPY N/A 8/10/2017    Procedure: COLONOSCOPY to cecum and TI with 2 clips placed at transverse;  Surgeon: Earnest PALOMO MD;  Location: [facility] ENDOSCOPY;  Service:     COLONOSCOPY N/A 12/22/2017    Procedure: COLONOSCOPY INTO CECUM WITH COLD POLYPECTOMIES;  Surgeon: Cleveland Devine MD;  Location: [facility] ENDOSCOPY;  Service:     COLONOSCOPY N/A 5/17/2021    Procedure: COLONOSCOPY to cecum;  Surgeon: Cleveland Devine MD;  Location: [facility] ENDOSCOPY;  Service: Gastroenterology;  Laterality: N/A;  Pre: Fe deficency anemia, h/x of polyps  Post: fair prep, normal    COLONOSCOPY W/ POLYPECTOMY N/A 4/20/2024    Procedure: COLONOSCOPY to cecum with cold and hot snare polypectomies with saline lift and APC;  Surgeon: David Payne MD;  Location: [facility] ENDOSCOPY;  Service: Gastroenterology;  Laterality: N/A;  pre- anemia  post- adequate prep, polyps, avm    ENDOSCOPY N/A 8/17/2017    Procedure: ESOPHAGOGASTRODUODENOSCOPY;  Surgeon: Porsha Ruby MD;  Location: [facility] ENDOSCOPY;  Service:     ENDOSCOPY N/A 12/22/2017    Procedure: ESOPHAGOGASTRODUODENOSCOPY WITH BIOPSIES;  Surgeon: Cleveland Devine MD;  Location: [facility] ENDOSCOPY;  Service:     ENDOSCOPY N/A 5/17/2021    Procedure: ESOPHAGOGASTRODUODENOSCOPY  with biopsies;  Surgeon: Cleveland Devine MD;  Location: [facility] ENDOSCOPY;  Service: Gastroenterology;  Laterality: N/A;  Pre: Fe deficency anemia, nausea, heme positive stool   Post: gastritis, sloughing of distal esophagus mucosa    ENDOSCOPY N/A 4/19/2024    Procedure: ESOPHAGOGASTRODUODENOSCOPY WITH BIOPSY;  Surgeon: Rick Morales MD;  Location: [facility] ENDOSCOPY;  Service:  Gastroenterology;  Laterality: N/A;  PRE: ANEMIA /  POST: ESOPHAGITIS    GALLBLADDER SURGERY      HEMORRHOIDECTOMY      HYSTERECTOMY      KIDNEY SURGERY  04/22/2013    Stent placement    MAZE PROCEDURE      MITRAL VALVE REPLACEMENT      TONSILLECTOMY      TRICUSPID VALVE REPLACEMENT       Family History   Adopted: Yes   Problem Relation Age of Onset    No Known Problems Mother     No Known Problems Father        Home Medications:  Prior to Admission medications    Medication Sig Start Date End Date Taking? Authorizing Provider   albuterol sulfate  (90 Base) MCG/ACT inhaler Inhale 2 puffs Every 4 (Four) Hours As Needed for Wheezing. 12/1/20   Javan Martinez MD   amoxicillin (AMOXIL) 500 MG capsule Take 2 capsules by mouth 3 (Three) Times a Day for 90 days. 5/23/25 8/21/25  Loy Clayton MD   aspirin 81 MG EC tablet Take 1 tablet by mouth Daily. 5/1/24   Yun Hunt APRN   atorvastatin (LIPITOR) 40 MG tablet TAKE 1 TABLET BY MOUTH EVERY DAY  Patient taking differently: Take 1 tablet by mouth Every Other Day. 11/21/22   Javan Martinez MD   budesonide (PULMICORT) 0.5 MG/2ML nebulizer solution Take 2 mL by nebulization 2 (Two) Times a Day for 30 days. Indications: Chronic Obstructive Lung Disease, Chronic hypoxic respiratory failure 4/19/23 11/15/24  Javan Martinez MD   bumetanide (BUMEX) 2 MG tablet Take 1 tablet by mouth 2 (Two) Times a Day for 30 days. 4/21/24 11/15/24  Arina Castaneda MD   cyclobenzaprine (FLEXERIL) 10 MG tablet TAKE 1 TABLET BY MOUTH EVERYDAY AT BEDTIME 2/20/23   Javan Martinez MD   dilTIAZem (CARDIZEM) 60 MG tablet Take 1 tablet by mouth Every 6 (Six) Hours for 30 days. 4/21/24 5/21/24  Arina Castaneda MD   docosanol (ABREVA) 10 % cream cream Apply 1 Application topically to the appropriate area as directed 5 (Five) Times a Day. 3/6/24   Ronald Marie MD   HYDROcodone-acetaminophen (NORCO) 7.5-325 MG per tablet Take 1 tablet by mouth Every 6 (Six)  Hours As Needed for Moderate Pain (Chronic pain medicine for low back pain). 4/19/23   Javan Martinez MD   ipratropium-albuterol (DUO-NEB) 0.5-2.5 mg/3 ml nebulizer Take 3 mL by nebulization Every 4 (Four) Hours As Needed for Wheezing. 3/24/23   Javan Martinez MD   loperamide (IMODIUM) 2 MG capsule Take 1 capsule by mouth 4 (Four) Times a Day As Needed for Diarrhea. 4/6/24   Gil Nicole MD   loratadine (CLARITIN) 10 MG tablet Take 1 tablet by mouth 2 (two) times a day.    Provider, MD Paresh   Multiple Vitamins-Minerals (MULTIVITAL) tablet Take 1 tablet by mouth Daily.    ProviderParesh MD   Nebulizer device 1 Units 4 (Four) Times a Day As Needed (Wheezing). J44.1 j20.8 9/23/20   Javan Martinez MD   O2 (OXYGEN) Inhale 2 L/min Continuous.    Provider, MD Paresh   ondansetron ODT (ZOFRAN-ODT) 4 MG disintegrating tablet Place 1 tablet on the tongue Every 8 (Eight) Hours As Needed for Nausea or Vomiting. 6/9/23   Fam Sosa MD   roflumilast (DALIRESP) 500 MCG tablet tablet Take 1 tablet by mouth Daily. 3/24/23   Javan Martinez MD   rOPINIRole (REQUIP) 2 MG tablet TAKE 1 TABLET BY MOUTH EVERY DAY AT NIGHT 2/20/23   Javan Martinez MD   sertraline (ZOLOFT) 100 MG tablet TAKE 1 TABLET BY MOUTH EVERY DAY 12/9/22   Black Campbell MD   spironolactone (ALDACTONE) 25 MG tablet Take 1 tablet by mouth Daily for 30 days. 4/22/24 5/22/24  Arina Castaneda MD   zolpidem (AMBIEN) 10 MG tablet Take 1 tablet by mouth At Night As Needed for Sleep. 4/19/23   Javan Martinez MD        Social History:   Social History     Tobacco Use    Smoking status: Former     Current packs/day: 3.00     Types: Cigarettes     Passive exposure: Past    Smokeless tobacco: Never    Tobacco comments:     caffeine - tea or mt dew    Vaping Use    Vaping status: Never Used   Substance Use Topics    Alcohol use: No    Drug use: No         Review of Systems:  Review of Systems     Physical Exam:  /56    "Pulse 79   Temp 98.1 °F (36.7 °C) (Oral)   Resp 20   Ht 170.2 cm (67.01\")   Wt 56 kg (123 lb 7.3 oz)   SpO2 91%   BMI 19.33 kg/m²     Physical Exam  Vitals and nursing note reviewed.   HENT:      Head: Normocephalic and atraumatic.      Nose: Nose normal. No nasal deformity.      Right Nostril: No septal hematoma or occlusion.      Left Nostril: No septal hematoma or occlusion.      Comments: No evidence of active epistaxis.  Nares patent with inhale exhale     Mouth/Throat:      Mouth: Mucous membranes are moist.      Comments: No evidence of posterior pharynx epistaxis drip.  Eyes:      Pupils: Pupils are equal, round, and reactive to light.   Pulmonary:      Effort: Pulmonary effort is normal.   Abdominal:      General: There is no distension.   Musculoskeletal:      Cervical back: Neck supple.   Skin:     General: Skin is warm and dry.   Neurological:      General: No focal deficit present.      Mental Status: She is alert and oriented to person, place, and time.   Psychiatric:         Mood and Affect: Mood normal.         Behavior: Behavior normal.                    Medical Decision Making:      Comorbidities that affect care:    Hypertension    External Notes reviewed:    Previous Clinic Note: Previous clinic note on 6/11/2025 reviewed patient was seen for trigger finger      The following orders were placed and all results were independently analyzed by me:  Orders Placed This Encounter   Procedures    Comprehensive Metabolic Panel    [place] Draw    CBC Auto Differential    CBC & Differential    Green Top (Gel)    Lavender Top    Gold Top - SST    Light Blue Top    Extra Tubes    Green Top (Gel)       Medications Given in the Emergency Department:  Medications   phenylephrine (JOSE-SYNEPHRINE) 0.5 % nasal spray 2 spray (2 sprays Nasal Given 6/22/25 0521)        ED Course:    ED Course as of 06/22/25 0644   Sun Jun 22, 2025   0528 CBC reviewed:  Evidence of a mild leukocytosis of 11,000.  Patient does " not meet SIRS criteria.  It is of mild normocytic anemia of 10.2.  This trending higher than patient's previous CBC findings.  No evidence of significant blood loss related to epistaxis today. [CB]   0529 CMP reviewed.  Evidence of ongoing CKD mild hypochloremia.  No other acute findings. [CB]      ED Course User Index  [CB] Edmundo Whitley, NATIVIDAD       Labs:    Lab Results (last 24 hours)       Procedure Component Value Units Date/Time    Comprehensive Metabolic Panel [[id number]]  (Abnormal) Collected: 06/22/25 0434    Specimen: Blood Updated: 06/22/25 0454     Glucose 118 mg/dL      BUN 15.5 mg/dL      Creatinine 1.04 mg/dL      Sodium 138 mmol/L      Potassium 4.5 mmol/L      Comment: Slight hemolysis detected by analyzer. Result may be falsely elevated.        Chloride 97 mmol/L      CO2 28.8 mmol/L      Calcium 9.4 mg/dL      Total Protein 6.5 g/dL      Albumin 4.5 g/dL      ALT (SGPT) 17 U/L      AST (SGOT) 24 U/L      Alkaline Phosphatase 104 U/L      Total Bilirubin 0.5 mg/dL      Globulin 2.0 gm/dL      A/G Ratio 2.3 g/dL      BUN/Creatinine Ratio 14.9     Anion Gap 12.2 mmol/L      eGFR 57.2 mL/min/1.73     Narrative:      GFR Categories in Chronic Kidney Disease (CKD)              GFR Category          GFR (mL/min/1.73)    Interpretation  G1                    90 or greater        Normal or high (1)  G2                    60-89                Mild decrease (1)  G3a                   45-59                Mild to moderate decrease  G3b                   30-44                Moderate to severe decrease  G4                    15-29                Severe decrease  G5                    14 or less           Kidney failure    (1)In the absence of evidence of kidney disease, neither GFR category G1 or G2 fulfill the criteria for CKD.    eGFR calculation 2021 CKD-EPI creatinine equation, which does not include race as a factor    CBC & Differential [[id number]]  (Abnormal) Collected: 06/22/25 0519    Specimen:  Blood Updated: 06/22/25 0524    Narrative:      The following orders were created for panel order CBC & Differential.  Procedure                               Abnormality         Status                     ---------                               -----------         ------                     CBC Auto Differential[[id number]]        Abnormal            Final result                 Please view results for these tests on the individual orders.    CBC Auto Differential [[id number]]  (Abnormal) Collected: 06/22/25 0519    Specimen: Blood Updated: 06/22/25 0524     WBC 11.26 10*3/mm3      RBC 3.71 10*6/mm3      Hemoglobin 10.2 g/dL      Hematocrit 31.7 %      MCV 85.4 fL      MCH 27.5 pg      MCHC 32.2 g/dL      RDW 13.8 %      RDW-SD 42.8 fl      MPV 10.5 fL      Platelets 184 10*3/mm3      Neutrophil % 90.0 %      Lymphocyte % 5.4 %      Monocyte % 3.0 %      Eosinophil % 0.9 %      Basophil % 0.3 %      Immature Grans % 0.4 %      Neutrophils, Absolute 10.13 10*3/mm3      Lymphocytes, Absolute 0.61 10*3/mm3      Monocytes, Absolute 0.34 10*3/mm3      Eosinophils, Absolute 0.10 10*3/mm3      Basophils, Absolute 0.03 10*3/mm3      Immature Grans, Absolute 0.05 10*3/mm3      nRBC 0.0 /100 WBC              Imaging:    No Radiology Exams Resulted Within Past 24 Hours      Differential Diagnosis and Discussion:    Epistaxis: Differential diagnosis includes but is not limited to trauma, environmental exposure, coagulopathy, allergic, infectious, hypertension, foreign bodies, hormonal, and tumors.    PROCEDURES:    Labs were collected in the emergency department and all labs were reviewed and interpreted by me.    No orders to display       Procedures    MDM     Amount and/or Complexity of Data Reviewed  Clinical lab tests: reviewed  Decide to obtain previous medical records or to obtain history from someone other than the patient: yes    In summary, patient is a 70-year-old female presenting today secondary to epistaxis x 1  day.    Patient's vital signs remained stable throughout the entirety of the ED course.  Patient was in no acute distress at bedside.  Patient's CBC revealed evidence of normocytic anemia trending with patient's previous hemoglobin findings.  No evidence of anemia requiring transfusion.  Patient's examination revealed resolved epistaxis at bedside during initial ED evaluation achieved with direct pressure.  No evidence of posterior bleed.  Patient was administered sprays of Afrin prior to discharge.  I discussed outpatient management of epistaxis.  I discussed return precautions related to epistaxis.  I discussed to follow-up with PCP in next 3 to 5 days.  Patient voiced understanding agreement at this time.                  Patient Care Considerations:    SEPSIS was considered but is NOT present in the emergency department as SIRS criteria is not present. ANTIBIOTICS: I considered prescribing antibiotics as an outpatient however no bacterial focus of infection was found.      Consultants/Shared Management Plan:    SHARED VISIT: I have discussed the case with my supervising physician, Dr. Valdez who states agrees with the treatment plan. The substantive portion of the medical decision was made by the attesting physician who made or approve the management plan and will take responsibility for the patient.  Clinical findings were discussed and ultimate disposition was made in consult with supervising physician.    Social Determinants of Health:    Patient is independent, reliable, and has access to care.       Disposition and Care Coordination:    Discharged: I considered escalation of care by admitting this patient to the hospital, however patient does not meet sepsis or SIRS criteria    I have explained the patient´s condition, diagnoses and treatment plan based on the information available to me at this time. I have answered questions and addressed any concerns. The patient has a good  understanding of the patient´s  diagnosis, condition, and treatment plan as can be expected at this point. The vital signs have been stable. The patient´s condition is stable and appropriate for discharge from the emergency department.      The patient will pursue further outpatient evaluation with the primary care physician or other designated or consulting physician as outlined in the discharge instructions. They are agreeable to this plan of care and follow-up instructions have been explained in detail. The patient has received these instructions in written format and has expressed an understanding of the discharge instructions. The patient is aware that any significant change in condition or worsening of symptoms should prompt an immediate return to this or the closest emergency department or call to [facility].  I have explained discharge medications and the need for follow up with the patient/caretakers. This was also printed in the discharge instructions. Patient was discharged with the following medications and follow up:      Medication List        Changed      atorvastatin 40 MG tablet  Commonly known as: LIPITOR  TAKE 1 TABLET BY MOUTH EVERY DAY  What changed: when to take this           Javan Martinez MD  [address] [address] [phone]    Schedule an appointment as soon as possible for a visit          Final diagnoses:   Anterior epistaxis        ED Disposition       ED Disposition   Discharge    Condition   Stable    Comment   --               This medical record created using voice recognition software.             Edmundo Whitley PA-C  06/22/25 0656

## 2025-06-22 NOTE — ED PROVIDER NOTES
"SHARED VISIT ATTESTATION:    This visit was performed by myself and an APC.  I personally approved the management plan/medical decision making and take responsibility for the patient management.      SHARED VISIT NOTE:    Patient is 70 y.o. year old female that presents to the ED for evaluation of epistaxis.     Physical Exam    ED Course:    /56   Pulse 79   Temp 98.1 °F (36.7 °C) (Oral)   Resp 20   Ht 170.2 cm (67.01\")   Wt 56 kg (123 lb 7.3 oz)   SpO2 91%   BMI 19.33 kg/m²       The following orders were placed and all results were independently analyzed by me:  Orders Placed This Encounter   Procedures    Comprehensive Metabolic Panel    [place] Draw    CBC Auto Differential    CBC & Differential    Green Top (Gel)    Lavender Top    Gold Top - SST    Light Blue Top    Extra Tubes    Green Top (Gel)       Medications Given in the Emergency Department:  Medications   phenylephrine (JOSE-SYNEPHRINE) 0.5 % nasal spray 2 spray (2 sprays Nasal Given 6/22/25 0521)        ED Course:    ED Course as of 06/22/25 0605   Sun Jun 22, 2025   0528 CBC reviewed:  Evidence of a mild leukocytosis of 11,000.  Patient does not meet SIRS criteria.  It is of mild normocytic anemia of 10.2.  This trending higher than patient's previous CBC findings.  No evidence of significant blood loss related to epistaxis today. [CB]   0529 CMP reviewed.  Evidence of ongoing CKD mild hypochloremia.  No other acute findings. [CB]      ED Course User Index  [CB] Edmundo Whitley, PACASE       Labs:    Lab Results (last 24 hours)       Procedure Component Value Units Date/Time    Comprehensive Metabolic Panel [[id number]]  (Abnormal) Collected: 06/22/25 0434    Specimen: Blood Updated: 06/22/25 0454     Glucose 118 mg/dL      BUN 15.5 mg/dL      Creatinine 1.04 mg/dL      Sodium 138 mmol/L      Potassium 4.5 mmol/L      Comment: Slight hemolysis detected by analyzer. Result may be falsely elevated.        Chloride 97 mmol/L      CO2 28.8 " mmol/L      Calcium 9.4 mg/dL      Total Protein 6.5 g/dL      Albumin 4.5 g/dL      ALT (SGPT) 17 U/L      AST (SGOT) 24 U/L      Alkaline Phosphatase 104 U/L      Total Bilirubin 0.5 mg/dL      Globulin 2.0 gm/dL      A/G Ratio 2.3 g/dL      BUN/Creatinine Ratio 14.9     Anion Gap 12.2 mmol/L      eGFR 57.2 mL/min/1.73     Narrative:      GFR Categories in Chronic Kidney Disease (CKD)              GFR Category          GFR (mL/min/1.73)    Interpretation  G1                    90 or greater        Normal or high (1)  G2                    60-89                Mild decrease (1)  G3a                   45-59                Mild to moderate decrease  G3b                   30-44                Moderate to severe decrease  G4                    15-29                Severe decrease  G5                    14 or less           Kidney failure    (1)In the absence of evidence of kidney disease, neither GFR category G1 or G2 fulfill the criteria for CKD.    eGFR calculation 2021 CKD-EPI creatinine equation, which does not include race as a factor    CBC & Differential [[id number]]  (Abnormal) Collected: 06/22/25 0519    Specimen: Blood Updated: 06/22/25 0524    Narrative:      The following orders were created for panel order CBC & Differential.  Procedure                               Abnormality         Status                     ---------                               -----------         ------                     CBC Auto Differential[[id number]]        Abnormal            Final result                 Please view results for these tests on the individual orders.    CBC Auto Differential [[id number]]  (Abnormal) Collected: 06/22/25 0519    Specimen: Blood Updated: 06/22/25 0524     WBC 11.26 10*3/mm3      RBC 3.71 10*6/mm3      Hemoglobin 10.2 g/dL      Hematocrit 31.7 %      MCV 85.4 fL      MCH 27.5 pg      MCHC 32.2 g/dL      RDW 13.8 %      RDW-SD 42.8 fl      MPV 10.5 fL      Platelets 184 10*3/mm3      Neutrophil % 90.0  %      Lymphocyte % 5.4 %      Monocyte % 3.0 %      Eosinophil % 0.9 %      Basophil % 0.3 %      Immature Grans % 0.4 %      Neutrophils, Absolute 10.13 10*3/mm3      Lymphocytes, Absolute 0.61 10*3/mm3      Monocytes, Absolute 0.34 10*3/mm3      Eosinophils, Absolute 0.10 10*3/mm3      Basophils, Absolute 0.03 10*3/mm3      Immature Grans, Absolute 0.05 10*3/mm3      nRBC 0.0 /100 WBC              Imaging:    No Radiology Exams Resulted Within Past 24 Hours    MDM:    Procedures    Labs were collected in the emergency department and all labs were reviewed and interpreted by me.                     Cash Valdez MD  06:05 EDT  06/22/25         Cash Valdez MD  06/22/25 0605

## 2025-06-22 NOTE — DISCHARGE INSTRUCTIONS
Thank you for allowing us to provide care for you today.  Your workup today revealed evidence of epistaxis that resolved at bedside with pressure.  You may use over-the-counter Afrin as needed for aid in repeat epistaxis.  Please apply pressure and lean forward for 20 minutes if nosebleed occurs again.  Please follow-up with PCP in next 5 7 days.  Thank you

## 2025-07-03 NOTE — TELEPHONE ENCOUNTER
Called in Lovenox to [facility] [phone], s/w Hank, wgt 167 lbs (75 kg), will give 80 mg (wasting 5 bid). Felicia will administer/amk   Dementia

## (undated) DEVICE — LN SMPL CO2 SHTRM SD STREAM W/M LUER

## (undated) DEVICE — KT ORCA ORCAPOD DISP STRL

## (undated) DEVICE — SINGLE-USE BIOPSY FORCEPS: Brand: RADIAL JAW 4

## (undated) DEVICE — THE TORRENT IRRIGATION SCOPE CONNECTOR IS USED WITH THE TORRENT IRRIGATION TUBING TO PROVIDE IRRIGATION FLUIDS SUCH AS STERILE WATER DURING GASTROINTESTINAL ENDOSCOPIC PROCEDURES WHEN USED IN CONJUNCTION WITH AN IRRIGATION PUMP (OR ELECTROSURGICAL UNIT).: Brand: TORRENT

## (undated) DEVICE — TUBING, SUCTION, 1/4" X 10', STRAIGHT: Brand: MEDLINE

## (undated) DEVICE — BLCK/BITE BLOX W/DENTL/RIM W/STRAP 54F

## (undated) DEVICE — FRCP BX RADJAW4 NDL 2.8 240CM LG OG BX40

## (undated) DEVICE — CANNULA,ADULT,SOFT-TOUCH,7'TUBE,UC: Brand: PENDING

## (undated) DEVICE — Device: Brand: DEFENDO AIR/WATER/SUCTION AND BIOPSY VALVE

## (undated) DEVICE — CANN O2 ETCO2 FITS ALL CONN CO2 SMPL A/ 7IN DISP LF

## (undated) DEVICE — TBG 02 CRUSH RESIST LF CLR 7FT

## (undated) DEVICE — MSK PROC CURAPLEX O2 2/ADAPT 7FT

## (undated) DEVICE — SENSR O2 OXIMAX FNGR A/ 18IN NONSTR

## (undated) DEVICE — ADAPT CLN BIOGUARD AIR/H2O DISP

## (undated) DEVICE — BITEBLOCK OMNI BLOC

## (undated) DEVICE — CANN NASL CO2 TRULINK W/O2 A/